# Patient Record
Sex: FEMALE | Race: WHITE | NOT HISPANIC OR LATINO | Employment: OTHER | URBAN - METROPOLITAN AREA
[De-identification: names, ages, dates, MRNs, and addresses within clinical notes are randomized per-mention and may not be internally consistent; named-entity substitution may affect disease eponyms.]

---

## 2017-10-23 ENCOUNTER — OFFICE VISIT (OUTPATIENT)
Dept: OBSTETRICS AND GYNECOLOGY | Facility: CLINIC | Age: 67
End: 2017-10-23
Payer: MEDICARE

## 2017-10-23 VITALS — BODY MASS INDEX: 23.88 KG/M2 | WEIGHT: 143.31 LBS | HEIGHT: 65 IN

## 2017-10-23 DIAGNOSIS — Z01.419 ENCOUNTER FOR GYNECOLOGICAL EXAMINATION: Primary | ICD-10-CM

## 2017-10-23 PROCEDURE — 99999 PR PBB SHADOW E&M-EST. PATIENT-LVL III: CPT | Mod: PBBFAC,,, | Performed by: OBSTETRICS & GYNECOLOGY

## 2017-10-23 PROCEDURE — G0101 CA SCREEN;PELVIC/BREAST EXAM: HCPCS | Mod: S$GLB,,, | Performed by: OBSTETRICS & GYNECOLOGY

## 2017-10-23 RX ORDER — FLAXSEED OIL 1000 MG
CAPSULE ORAL
Status: ON HOLD | COMMUNITY
Start: 2013-03-11 | End: 2023-02-01 | Stop reason: HOSPADM

## 2017-10-23 RX ORDER — VENLAFAXINE HYDROCHLORIDE 75 MG/1
75 CAPSULE, EXTENDED RELEASE ORAL DAILY
COMMUNITY
End: 2019-10-17 | Stop reason: SDUPTHER

## 2017-10-23 RX ORDER — METFORMIN HYDROCHLORIDE 500 MG/1
500 TABLET ORAL 2 TIMES DAILY WITH MEALS
COMMUNITY
Start: 2015-05-04 | End: 2019-10-17 | Stop reason: SDUPTHER

## 2017-10-23 RX ORDER — GABAPENTIN 600 MG/1
400 TABLET ORAL 3 TIMES DAILY
COMMUNITY
End: 2019-10-17

## 2017-10-23 NOTE — PROGRESS NOTES
"CC: Well woman exam    ANA ROSA Green is a 67 y.o. female No obstetric history on file. presents for a well woman exam.  She is established.LMP: No LMP recorded. Patient has had a hysterectomy..    Annual Exam, Went on a mission trip in  in [facility], had a fall & blacked out, hit her head.   Stated she has had 2-3 falls within the past yr. Seeing Dr Sanchez PCP   On a high protein diet.    Last mammogram Dr. BURGOS at [address]   Last Dexa  normal.    Health Maintenance   Topic Date Due    Hepatitis C Screening  1950    Lipid Panel  1950    Mammogram  1990    DEXA SCAN  1990    Colonoscopy  2000       Past Medical History:   Diagnosis Date    Anxiety     Depression     Family history of breast cancer in mother      at age 68    Hyperlipidemia     Hypertension     Menopause     Peptic ulcer     Reflux esophagitis      Past Surgical History:   Procedure Laterality Date    APPENDECTOMY      CARPAL TUNNEL RELEASE  2017    , left    DILATION AND CURETTAGE OF UTERUS  1986    HYSTERECTOMY      BEAU w/ nilo, no BSO     SHOULDER SURGERY   &     right rotator cuff    TONSILLECTOMY       Family History   Problem Relation Age of Onset    Breast cancer Mother     Hyperlipidemia Sister     Breast cancer Paternal Aunt      Social History   Substance Use Topics    Smoking status: Never Smoker    Smokeless tobacco: Never Used    Alcohol use No     OB History     No data available          Ht 5' 5" (1.651 m)   Wt 65 kg (143 lb 4.8 oz)   BMI 23.85 kg/m²     ROS:  GENERAL: Denies weight gain or weight loss. Feeling well overall.   SKIN: Denies rash or lesions. .   CHEST: Denies chest pain or shortness of breath.   CARDIOVASCULAR: Denies palpitations or left sided chest pain.   ABDOMEN: No abdominal pain  URINARY: No frequency, dysuria, hematuria, or burning on urination.    PE:   APPEARANCE: Well nourished, well developed, in " no acute distress.  AFFECT: WNL, alert and oriented x 3.  SKIN: No acne or hirsutism.  ABDOMEN: Soft. No tenderness or masses. No hepatosplenomegaly. No hernias.  BREASTS: Symmetrical, no skin changes or visible lesions. No palpable masses, nipple discharge bilaterally.  PELVIC: Normal external female genitalia without lesions. Normal hair distribution.   Adequate perineal body, normal urethral meatus. Vagina atrophic without lesions or discharge.    Bimanual exam shows uterus and cervix to be surgically absent. Adnexa without masses or tenderness.  EXTREMITIES: No edema.      ICD-10-CM ICD-9-CM    1. Encounter for gynecological examination Z01.419 V72.31 No pap done           Patient was counseled today on A.C.S. Pap guidelines and recommendations for yearly pelvic exams, mammograms and monthly self breast exams; to see her PCP for other health maintenance.     Return in about 1 year (around 10/23/2018).

## 2017-11-01 ENCOUNTER — OFFICE VISIT (OUTPATIENT)
Dept: URGENT CARE | Facility: CLINIC | Age: 67
End: 2017-11-01
Payer: MEDICARE

## 2017-11-01 VITALS
TEMPERATURE: 97 F | HEIGHT: 65 IN | OXYGEN SATURATION: 98 % | RESPIRATION RATE: 16 BRPM | DIASTOLIC BLOOD PRESSURE: 80 MMHG | BODY MASS INDEX: 24.16 KG/M2 | SYSTOLIC BLOOD PRESSURE: 134 MMHG | HEART RATE: 61 BPM | WEIGHT: 145 LBS

## 2017-11-01 DIAGNOSIS — J06.0 SORE THROAT AND LARYNGITIS: ICD-10-CM

## 2017-11-01 DIAGNOSIS — J06.9 UPPER RESPIRATORY TRACT INFECTION, UNSPECIFIED TYPE: Primary | ICD-10-CM

## 2017-11-01 PROCEDURE — 96372 THER/PROPH/DIAG INJ SC/IM: CPT | Mod: S$GLB,,, | Performed by: EMERGENCY MEDICINE

## 2017-11-01 PROCEDURE — 99213 OFFICE O/P EST LOW 20 MIN: CPT | Mod: 25,S$GLB,, | Performed by: NURSE PRACTITIONER

## 2017-11-01 RX ORDER — CODEINE PHOSPHATE AND GUAIFENESIN 10; 100 MG/5ML; MG/5ML
10 SOLUTION ORAL EVERY 6 HOURS PRN
Qty: 120 ML | Refills: 0 | Status: SHIPPED | OUTPATIENT
Start: 2017-11-01 | End: 2017-11-11

## 2017-11-01 RX ORDER — BETAMETHASONE SODIUM PHOSPHATE AND BETAMETHASONE ACETATE 3; 3 MG/ML; MG/ML
6 INJECTION, SUSPENSION INTRA-ARTICULAR; INTRALESIONAL; INTRAMUSCULAR; SOFT TISSUE
Status: COMPLETED | OUTPATIENT
Start: 2017-11-01 | End: 2017-11-01

## 2017-11-01 RX ORDER — BENZONATATE 200 MG/1
200 CAPSULE ORAL 3 TIMES DAILY PRN
Qty: 30 CAPSULE | Refills: 0 | Status: SHIPPED | OUTPATIENT
Start: 2017-11-01 | End: 2017-11-11

## 2017-11-01 RX ORDER — FLUTICASONE PROPIONATE 50 MCG
1 SPRAY, SUSPENSION (ML) NASAL 2 TIMES DAILY
Qty: 1 BOTTLE | Refills: 0 | Status: SHIPPED | OUTPATIENT
Start: 2017-11-01 | End: 2018-10-29

## 2017-11-01 RX ORDER — MONTELUKAST SODIUM 10 MG/1
10 TABLET ORAL DAILY
Qty: 30 TABLET | Refills: 2 | Status: SHIPPED | OUTPATIENT
Start: 2017-11-01 | End: 2018-10-29

## 2017-11-01 RX ORDER — AZELASTINE 1 MG/ML
1 SPRAY, METERED NASAL 2 TIMES DAILY
Qty: 30 ML | Refills: 0 | Status: SHIPPED | OUTPATIENT
Start: 2017-11-01 | End: 2018-10-29

## 2017-11-01 RX ADMIN — BETAMETHASONE SODIUM PHOSPHATE AND BETAMETHASONE ACETATE 6 MG: 3; 3 INJECTION, SUSPENSION INTRA-ARTICULAR; INTRALESIONAL; INTRAMUSCULAR; SOFT TISSUE at 09:11

## 2017-11-01 NOTE — PATIENT INSTRUCTIONS
"                                                         URI   If your condition worsens or fails to improve we recommend that you receive another evaluation at the ER immediately or contact your PCP to discuss your concerns or return here. You must understand that you've received an urgent care treatment only and that you may be released before all your medical problems are known or treated. You the patient will arrange for follouwp care as instructed.   If we discussed that I think your illness is viral, it will not respond to antibiotics and will last 10-14 days.   Flonase (fluticasone) is a nasal spray which is available over the counter and may help with your symptoms.   Zyrtec D, Claritin D or Allegra D can also help with symptoms of congestion and drainage.   If you have hypertension avoid using the "D" which is the decongestant   If you just have drainage you can take plain zyrtec, claritin or allegra   If you just have a congested feeling you can take pseudoephedrine (unless you have high blood pressure) which you have to sign for behind the counter. Do not buy the phenylephrine which is on the shelf as it is not effective   Rest and fluids are also important.   Tylenol or ibuprofen can also be used as directed for pain unless you have an allergy to them or medical condition such as stomach ulcers, kidney or liver disease or blood thinners etc for which you should not be taking these type of medications.   If you are flying in the next few days Afrin nose drops for the airplane flight upon take off and landing may help. Other than at those times refrain from using afrin.   If you were prescribed a narcotic do not drive or operate heavy machinery while taking these medications.       Viral Upper Respiratory Illness (Adult)  You have a viral upper respiratory illness (URI), which is another term for the common cold. This illness is contagious during the first few days. It is spread through the air by coughing " and sneezing. It may also be spread by direct contact (touching the sick person and then touching your own eyes, nose, or mouth). Frequent handwashing will decrease risk of spread. Most viral illnesses go away within 7 to 10 days with rest and simple home remedies. Sometimes the illness may last for several weeks. Antibiotics will not kill a virus, and they are generally not prescribed for this condition.    Home care  · If symptoms are severe, rest at home for the first 2 to 3 days. When you resume activity, don't let yourself get too tired.  · Avoid being exposed to cigarette smoke (yours or others).  · You may use acetaminophen or ibuprofen to control pain and fever, unless another medicine was prescribed. (Note: If you have chronic liver or kidney disease, have ever had a stomach ulcer or gastrointestinal bleeding, or are taking blood-thinning medicines, talk with your healthcare provider before using these medicines.) Aspirin should never be given to anyone under 18 years of age who is ill with a viral infection or fever. It may cause severe liver or brain damage.  · Your appetite may be poor, so a light diet is fine. Avoid dehydration by drinking 6 to 8 glasses of fluids per day (water, soft drinks, juices, tea, or soup). Extra fluids will help loosen secretions in the nose and lungs.  · Over-the-counter cold medicines will not shorten the length of time youre sick, but they may be helpful for the following symptoms: cough, sore throat, and nasal and sinus congestion. (Note: Do not use decongestants if you have high blood pressure.)  Follow-up care  Follow up with your healthcare provider, or as advised.  When to seek medical advice  Call your healthcare provider right away if any of these occur:  · Cough with lots of colored sputum (mucus)  · Severe headache; face, neck, or ear pain  · Difficulty swallowing due to throat pain  · Fever of 100.4°F (38°C)  Call 911, or get immediate medical care  Call  emergency services right away if any of these occur:  · Chest pain, shortness of breath, wheezing, or difficulty breathing  · Coughing up blood  · Inability to swallow due to throat pain  Date Last Reviewed: 9/13/2015 © 2000-2017 [employer]. [address] [place] [address]. All rights reserved. This information is not intended as a substitute for professional medical care. Always follow your healthcare professional's instructions.        Laryngitis    Laryngitis is a swelling of the tissues around the vocal cords. Symptoms include a hoarse (scratchy) voice. The voice may be lost completely. It may be caused by a viral illness, such as a head or chest cold. It may also be due to overuse and strain of the voice. Smoking, drinking alcohol, acid reflux, allergies, or inhaling harsh chemicals may also lead to symptoms. This condition will usually resolve in 1-2 weeks.  Home care  · Rest your voice until it recovers. Talk as little as possible. If your symptoms are severe, rest at home for a day or so.  · Breathing cool steam from a humidifier/vaporizer or in a steamy shower may be helpful.  · Drink plenty of fluids to stay well hydrated.  · Do not smoke  Follow-up care  Follow up with your healthcare provider or this facility if you have not improved after one week.  When to seek medical advice  Contact your healthcare provider for any of the following:  · Severe pain with swallowing  · Trouble opening mouth  · Neck swelling, neck pain, or trouble moving neck  · Noisy breathing or trouble breathing  · Fever of 100.4°F (38.ºC) or higher, or as directed by your healthcare provider  · Drooling  · Symptoms do not resolve in 2 weeks  Date Last Reviewed: 4/26/2015 © 2000-2017 [employer]. [address] [place] [address]. All rights reserved. This information is not intended as a substitute for professional medical care. Always follow your healthcare professional's  instructions.

## 2017-11-01 NOTE — PROGRESS NOTES
"Subjective:       Patient ID: ANA ROSA Green is a 67 y.o. female.    Vitals:  height is 5' 5" (1.651 m) and weight is 65.8 kg (145 lb). Her oral temperature is 97.3 °F (36.3 °C). Her blood pressure is 134/80 and her pulse is 61. Her respiration is 16 and oxygen saturation is 98%.     Chief Complaint: Cough    Cough   This is a new problem. The current episode started in the past 7 days. The problem has been gradually worsening. The problem occurs every few minutes. The cough is productive of sputum. Associated symptoms include nasal congestion and postnasal drip. Pertinent negatives include no chest pain, chills, ear pain, eye redness, fever, headaches, myalgias, sore throat, shortness of breath or wheezing. The symptoms are aggravated by cold air and lying down. She has tried OTC cough suppressant for the symptoms. The treatment provided mild relief.     Review of Systems   Constitution: Negative for chills, fever and malaise/fatigue.   HENT: Positive for congestion and postnasal drip. Negative for ear pain, hoarse voice and sore throat.    Eyes: Negative for discharge and redness.   Cardiovascular: Negative for chest pain, dyspnea on exertion and leg swelling.   Respiratory: Positive for sputum production. Negative for cough, shortness of breath and wheezing.    Musculoskeletal: Negative for myalgias.   Gastrointestinal: Negative for abdominal pain and nausea.   Neurological: Negative for headaches.       Objective:      Physical Exam   Constitutional: She is oriented to person, place, and time. Vital signs are normal. She appears well-developed and well-nourished. She is cooperative.  Non-toxic appearance. She does not have a sickly appearance. She does not appear ill. No distress.   HENT:   Head: Normocephalic and atraumatic.   Right Ear: Hearing, tympanic membrane, external ear and ear canal normal.   Left Ear: Hearing, tympanic membrane, external ear and ear canal normal.   Nose: Mucosal edema and rhinorrhea " present. No nasal deformity. No epistaxis. Right sinus exhibits no maxillary sinus tenderness and no frontal sinus tenderness. Left sinus exhibits no maxillary sinus tenderness and no frontal sinus tenderness.   Mouth/Throat: Uvula is midline and mucous membranes are normal. No trismus in the jaw. Normal dentition. No uvula swelling. Posterior oropharyngeal erythema present. No oropharyngeal exudate or posterior oropharyngeal edema. Tonsils are 0 on the right. Tonsils are 0 on the left.   +laryngitis   +cobblestone appearance   Eyes: Conjunctivae and lids are normal. No scleral icterus.   Sclera clear bilat   Neck: Trachea normal, full passive range of motion without pain and phonation normal. Neck supple.   Cardiovascular: Normal rate, regular rhythm, normal heart sounds and normal pulses.    Pulmonary/Chest: Effort normal and breath sounds normal. No respiratory distress.   Abdominal: Soft. Normal appearance and bowel sounds are normal. She exhibits no distension. There is no tenderness.   Musculoskeletal: Normal range of motion. She exhibits no edema or deformity.   Lymphadenopathy:     She has no cervical adenopathy.   Neurological: She is alert and oriented to person, place, and time. She exhibits normal muscle tone. Coordination normal.   Skin: Skin is warm, dry and intact. She is not diaphoretic. No pallor.   Psychiatric: She has a normal mood and affect. Her speech is normal and behavior is normal. Judgment and thought content normal. Cognition and memory are normal.   Nursing note and vitals reviewed.      Assessment:       1. Upper respiratory tract infection, unspecified type    2. Sore throat and laryngitis        Plan:         Upper respiratory tract infection, unspecified type  -     betamethasone acetate-betamethasone sodium phosphate injection 6 mg; Inject 1 mL (6 mg total) into the muscle one time.  -     benzonatate (TESSALON) 200 MG capsule; Take 1 capsule (200 mg total) by mouth 3 (three) times  daily as needed for Cough.  Dispense: 30 capsule; Refill: 0  -     guaifenesin-codeine 100-10 mg/5 ml (TUSSI-ORGANIDIN NR)  mg/5 mL syrup; Take 10 mLs by mouth every 6 (six) hours as needed for Cough.  Dispense: 120 mL; Refill: 0  -     azelastine (ASTELIN) 137 mcg (0.1 %) nasal spray; 1 spray (137 mcg total) by Nasal route 2 (two) times daily.  Dispense: 30 mL; Refill: 0  -     fluticasone (FLONASE) 50 mcg/actuation nasal spray; 1 spray by Each Nare route 2 (two) times daily.  Dispense: 1 Bottle; Refill: 0  -     montelukast (SINGULAIR) 10 mg tablet; Take 1 tablet (10 mg total) by mouth once daily.  Dispense: 30 tablet; Refill: 2    Sore throat and laryngitis  -     betamethasone acetate-betamethasone sodium phosphate injection 6 mg; Inject 1 mL (6 mg total) into the muscle one time.  -     azelastine (ASTELIN) 137 mcg (0.1 %) nasal spray; 1 spray (137 mcg total) by Nasal route 2 (two) times daily.  Dispense: 30 mL; Refill: 0  -     fluticasone (FLONASE) 50 mcg/actuation nasal spray; 1 spray by Each Nare route 2 (two) times daily.  Dispense: 1 Bottle; Refill: 0  -     montelukast (SINGULAIR) 10 mg tablet; Take 1 tablet (10 mg total) by mouth once daily.  Dispense: 30 tablet; Refill: 2    Symptomatic care of viral illness discussed.  No recent steroid use or contraindications reported by patient, steroid shot administered in clinic today.  Stay hydrated! Rest.  Sedation warnings given for cough syrup.  F/u with PCP.  Go to the ER for worsening of symptoms.

## 2017-11-04 ENCOUNTER — TELEPHONE (OUTPATIENT)
Dept: URGENT CARE | Facility: CLINIC | Age: 67
End: 2017-11-04

## 2017-11-04 NOTE — TELEPHONE ENCOUNTER
When I called & asked for the pt, the person on the other line stated that I had the wrong number.

## 2018-07-25 ENCOUNTER — OFFICE VISIT (OUTPATIENT)
Dept: URGENT CARE | Facility: CLINIC | Age: 68
End: 2018-07-25
Payer: MEDICARE

## 2018-07-25 VITALS
RESPIRATION RATE: 18 BRPM | HEIGHT: 65 IN | BODY MASS INDEX: 24.16 KG/M2 | TEMPERATURE: 97 F | WEIGHT: 145 LBS | DIASTOLIC BLOOD PRESSURE: 92 MMHG | SYSTOLIC BLOOD PRESSURE: 139 MMHG | HEART RATE: 88 BPM | OXYGEN SATURATION: 98 %

## 2018-07-25 DIAGNOSIS — N30.00 ACUTE CYSTITIS WITHOUT HEMATURIA: ICD-10-CM

## 2018-07-25 DIAGNOSIS — R30.0 DYSURIA: Primary | ICD-10-CM

## 2018-07-25 LAB
BILIRUB UR QL STRIP: NEGATIVE
GLUCOSE UR QL STRIP: NEGATIVE
KETONES UR QL STRIP: NEGATIVE
LEUKOCYTE ESTERASE UR QL STRIP: POSITIVE
PH, POC UA: 6.5 (ref 5–8)
POC BLOOD, URINE: NEGATIVE
POC NITRATES, URINE: NEGATIVE
PROT UR QL STRIP: NEGATIVE
SP GR UR STRIP: 1.01 (ref 1–1.03)
UROBILINOGEN UR STRIP-ACNC: NORMAL (ref 0.1–1.1)

## 2018-07-25 PROCEDURE — 81003 URINALYSIS AUTO W/O SCOPE: CPT | Mod: QW,S$GLB,, | Performed by: NURSE PRACTITIONER

## 2018-07-25 PROCEDURE — 99214 OFFICE O/P EST MOD 30 MIN: CPT | Mod: 25,S$GLB,, | Performed by: NURSE PRACTITIONER

## 2018-07-25 PROCEDURE — 99000 SPECIMEN HANDLING OFFICE-LAB: CPT | Mod: S$GLB,,, | Performed by: NURSE PRACTITIONER

## 2018-07-25 RX ORDER — SULFAMETHOXAZOLE AND TRIMETHOPRIM 800; 160 MG/1; MG/1
1 TABLET ORAL 2 TIMES DAILY
Qty: 20 TABLET | Refills: 0 | Status: SHIPPED | OUTPATIENT
Start: 2018-07-25 | End: 2018-08-04

## 2018-07-25 RX ORDER — PHENAZOPYRIDINE HYDROCHLORIDE 200 MG/1
200 TABLET, FILM COATED ORAL 3 TIMES DAILY
Qty: 6 TABLET | Refills: 0 | Status: SHIPPED | OUTPATIENT
Start: 2018-07-25 | End: 2018-07-27

## 2018-07-25 NOTE — PATIENT INSTRUCTIONS

## 2018-07-25 NOTE — PROGRESS NOTES
"Subjective:       Patient ID: ANA ROSA Green is a 67 y.o. female.    Vitals:  height is 5' 5" (1.651 m) and weight is 65.8 kg (145 lb). Her oral temperature is 96.6 °F (35.9 °C). Her blood pressure is 139/92 (abnormal) and her pulse is 88. Her respiration is 18 and oxygen saturation is 98%.     Chief Complaint: Urinary Frequency    Pt presents to clinic with c/o urinary urgency and frequency x2 days. Denies fever, back pain and dysuria. + hx "kidney infection causing blood infection" x8 yrs ago.       Urinary Frequency    This is a new problem. The current episode started yesterday (2 days). The problem occurs every urination. The problem has been unchanged. The pain is at a severity of 0/10. The patient is experiencing no pain. There has been no fever. There is a history of pyelonephritis. Associated symptoms include frequency and urgency. Pertinent negatives include no chills, hematuria, nausea or vomiting. She has tried increased fluids for the symptoms. The treatment provided no relief. Her past medical history is significant for recurrent UTIs.     Review of Systems   Constitution: Negative for chills and fever.   Skin: Negative for itching.   Musculoskeletal: Negative for back pain.   Gastrointestinal: Negative for abdominal pain, nausea and vomiting.   Genitourinary: Positive for frequency and urgency. Negative for dysuria, genital sores, hematuria, missed menses and non-menstrual bleeding.   All other systems reviewed and are negative.      Objective:      Physical Exam   Constitutional: She is oriented to person, place, and time. She appears well-developed and well-nourished.   HENT:   Head: Normocephalic and atraumatic.   Right Ear: External ear normal.   Left Ear: External ear normal.   Nose: Nose normal.   Eyes: Lids are normal.   Neck: Trachea normal, normal range of motion and phonation normal. Neck supple.   Cardiovascular: Normal pulses.    Pulmonary/Chest: Effort normal.   Abdominal: Soft. Normal " appearance and bowel sounds are normal. She exhibits no distension. There is no tenderness. There is no CVA tenderness.   Neurological: She is alert and oriented to person, place, and time.   Skin: Skin is warm, dry and intact.   Psychiatric: She has a normal mood and affect. Her speech is normal and behavior is normal. Cognition and memory are normal.   Nursing note and vitals reviewed.      Assessment:       1. Dysuria    2. Acute cystitis without hematuria        Plan:     Urine culture sent, will contact pt with results in 5-7 days.     Dysuria  -     POCT Urinalysis, Dipstick, Automated, W/O Scope  -     Urine culture  -     phenazopyridine (PYRIDIUM) 200 MG tablet; Take 1 tablet (200 mg total) by mouth 3 (three) times daily. for 2 days  Dispense: 6 tablet; Refill: 0    Acute cystitis without hematuria  -     sulfamethoxazole-trimethoprim 800-160mg (BACTRIM DS) 800-160 mg Tab; Take 1 tablet by mouth 2 (two) times daily. for 10 days  Dispense: 20 tablet; Refill: 0

## 2018-07-27 ENCOUNTER — TELEPHONE (OUTPATIENT)
Dept: URGENT CARE | Facility: CLINIC | Age: 68
End: 2018-07-27

## 2018-07-29 ENCOUNTER — TELEPHONE (OUTPATIENT)
Dept: URGENT CARE | Facility: CLINIC | Age: 68
End: 2018-07-29

## 2018-07-29 LAB
BACTERIA UR CULT: NORMAL
BACTERIA UR CULT: NORMAL

## 2018-07-30 ENCOUNTER — TELEPHONE (OUTPATIENT)
Dept: URGENT CARE | Facility: CLINIC | Age: 68
End: 2018-07-30

## 2018-07-30 NOTE — TELEPHONE ENCOUNTER
Patient returned call.  States she is having improvement of symptoms.  Patient's lab results given to patient.  Advised that if she has any worsening or no improvement of symptoms, then follow up with PCP or UC.  Verbalized understanding.

## 2018-10-29 ENCOUNTER — OFFICE VISIT (OUTPATIENT)
Dept: OBSTETRICS AND GYNECOLOGY | Facility: CLINIC | Age: 68
End: 2018-10-29
Payer: MEDICARE

## 2018-10-29 VITALS
DIASTOLIC BLOOD PRESSURE: 80 MMHG | SYSTOLIC BLOOD PRESSURE: 142 MMHG | WEIGHT: 160.94 LBS | BODY MASS INDEX: 26.81 KG/M2 | HEIGHT: 65 IN

## 2018-10-29 DIAGNOSIS — B37.2 SKIN YEAST INFECTION: ICD-10-CM

## 2018-10-29 DIAGNOSIS — Z01.419 ENCOUNTER FOR GYNECOLOGICAL EXAMINATION: Primary | ICD-10-CM

## 2018-10-29 PROCEDURE — 99397 PER PM REEVAL EST PAT 65+ YR: CPT | Mod: S$GLB,,, | Performed by: OBSTETRICS & GYNECOLOGY

## 2018-10-29 PROCEDURE — 99999 PR PBB SHADOW E&M-EST. PATIENT-LVL III: CPT | Mod: PBBFAC,,, | Performed by: OBSTETRICS & GYNECOLOGY

## 2018-10-29 RX ORDER — ATORVASTATIN CALCIUM 40 MG/1
TABLET, FILM COATED ORAL
Refills: 3 | COMMUNITY
Start: 2018-10-26 | End: 2019-10-17 | Stop reason: SDUPTHER

## 2018-10-29 RX ORDER — TEMAZEPAM 30 MG/1
CAPSULE ORAL
Refills: 3 | COMMUNITY
Start: 2018-10-26 | End: 2019-10-17 | Stop reason: SDUPTHER

## 2018-10-29 RX ORDER — CLOTRIMAZOLE AND BETAMETHASONE DIPROPIONATE 10; .64 MG/G; MG/G
CREAM TOPICAL
Qty: 15 G | Refills: 0 | Status: SHIPPED | OUTPATIENT
Start: 2018-10-29 | End: 2019-10-17

## 2018-10-29 RX ORDER — PANTOPRAZOLE SODIUM 40 MG/1
TABLET, DELAYED RELEASE ORAL
Refills: 11 | COMMUNITY
Start: 2018-10-26 | End: 2020-06-16 | Stop reason: SDUPTHER

## 2018-10-29 RX ORDER — GABAPENTIN 100 MG/1
CAPSULE ORAL
Refills: 5 | COMMUNITY
Start: 2018-10-09 | End: 2019-10-17

## 2018-10-29 NOTE — PROGRESS NOTES
[facility] Complaint Well woman exam:  Well Woman (Annual Exam  --  Last Vaginal Pap/Hpv 14, Negative    --  Last MMG 18, Birads 2/B ( Dr Pavel Deleon)  --  Colonoscopy 2018, Normal  ([facility]) )      ANA ROSA Green is a 68 y.o. female  presents for a well woman exam.    She is established.  No GYN c/o  Recent dx celiac and diverticulosis  S/p BEAU and No bso  Sees Dr Cao for psoriasis but does have        LMP: none  S/p BEAU  Last pap:hyst  Last MM2018 Birads 2 EJ  Last colonoscopy: 2018 Dr Amaral     Current Outpatient Medications:     atorvastatin (LIPITOR) 40 MG tablet, , Disp: , Rfl: 3    flaxseed oil 1,000 mg Cap, once a day, Disp: , Rfl:     gabapentin (NEURONTIN) 400 MG capsule, Take 400 mg by mouth 3 (three) times daily., Disp: , Rfl:     gluc-condr-om3-dha-epa-fish-st (GLUCOSAMINE CHONDROITIN PLUS) 202-247-09-54 mg Cap, once a day, Disp: , Rfl:     metformin (GLUCOPHAGE) 500 MG tablet, twice a day, Disp: , Rfl:     omega 3-dha-epa-fish oil (FISH OIL) 100-160-1,000 mg Cap, once a day, Disp: , Rfl:     pantoprazole (PROTONIX) 40 MG tablet, , Disp: , Rfl: 11    temazepam (RESTORIL) 30 mg capsule, , Disp: , Rfl: 3    venlafaxine (EFFEXOR-XR) 75 MG 24 hr capsule, Take 75 mg by mouth once daily., Disp: , Rfl:     vitamins  A,C,E-zinc-copper (PRESERVISION AREDS) 14,320-226-200 unit-mg-unit Cap, Take 1 tablet by mouth once daily., Disp: , Rfl:     clotrimazole-betamethasone 1-0.05% (LOTRISONE) cream, Apply under breasts 2 times daily, Disp: 15 g, Rfl: 0    gabapentin (NEURONTIN) 100 MG capsule, , Disp: , Rfl: 5    Past Medical History:   Diagnosis Date    Anxiety     Celiac disease 2018    Depression     Family history of breast cancer in mother      at age 68    H/O mammogram 2018    Birads 2/B  (Dr Deleon, Orders MMG yearly)     Hx of bone density study 2014    Normal     Hyperlipidemia     Hypertension     Menopause     Peptic ulcer      "Reflux esophagitis     Vaginal Pap smear 2014    Pap/Hpv Negative        Past Surgical History:   Procedure Laterality Date    APPENDECTOMY      CARPAL TUNNEL RELEASE Bilateral 2017    ,     COLONOSCOPY  2018    Normal  (Cornell Velazco)     DILATION AND CURETTAGE OF UTERUS  1986    DUPUYTREN CONTRACTURE RELEASE Bilateral 2017    HYSTERECTOMY  1987    BEAU w/ appy, no BSO     SHOULDER SURGERY  2009 &     right rotator cuff    TONSILLECTOMY      UPPER GASTROINTESTINAL ENDOSCOPY  2018       OB History    Para Term  AB Living   0 0 0 0 0 0   SAB TAB Ectopic Multiple Live Births   0 0 0 0 0             Family History   Problem Relation Age of Onset    Breast cancer Mother     Cancer Mother     Hyperlipidemia Sister     Breast cancer Paternal Aunt     Cancer Paternal Aunt        Social History     Tobacco Use    Smoking status: Never Smoker    Smokeless tobacco: Never Used   Substance Use Topics    Alcohol use: No    Drug use: No         ROS:    GENERAL: Denies weight gain or weight loss. Feeling well overall.   SKIN: Denies rash or lesions.   HEENT: Denies headaches, or vision changes.   CARDIOVASCULAR: Denies palpitations or left sided chest pain.   RESPIRATORY: Denies shortness of breath or dyspnea on exertion.  BREASTS: Denies pain, lumps, or nipple discharge.   ABDOMEN: Denies abdominal pain, constipation, diarrhea, nausea, vomiting, change in appetite or rectal bleeding.   URINARY: Denies frequency, dysuria, hematuria.  NEUROLOGIC: Denies syncope or weakness.   PSYCHIATRIC: Denies depression, anxiety or mood swings.    Physical Exam:  BP (!) 142/80   Ht 5' 5" (1.651 m)   Wt 73 kg (160 lb 15 oz)   LMP  (LMP Unknown) Comment: BEAU/ NO BSO    BMI 26.78 kg/m²     APPEARANCE: Well nourished, well developed, in no acute distress.  AFFECT: WNL, alert and oriented x 3  SKIN: No acne or hirsutism  BREASTS: Symmetrical, no skin changes Yeast seen on left " breast                    No nipple discharge. No palpable masses bilaterally  ABDOMEN: soft Non tender Non distended No masses  PELVIC:   Normal external genitalia without lesions.   Normal urethral meatus. No signif cystocele or rectocele.  Vagina atrophic without lesions or discharge.  Cuff intact  Cervix absent  Adnexa without masses or tenderness.    EXTREMITIES: No edema.    ASSESSMENT AND PLAN  1. Encounter for gynecological examination     2. Skin yeast infection  clotrimazole-betamethasone 1-0.05% (LOTRISONE) cream       ANA ORSA PARK was seen today for well woman.    Diagnoses and all orders for this visit:    Encounter for gynecological examination    Skin yeast infection  -     clotrimazole-betamethasone 1-0.05% (LOTRISONE) cream; Apply under breasts 2 times daily        Annual well woman exam- pap not applicable s/p Hyst    Patient was counseled today on A.C.S. Pap guidelines and recommendations for yearly pelvic exams, mammograms and monthly self breast exams; to see her PCP for other health maintenance.     Patient encouraged to register for portal and results will be sent via portal.    Follow-up in about 1 year (around 10/29/2019).         Health Maintenance   Topic Date Due    Hepatitis C Screening  1950    Lipid Panel  1950    TETANUS VACCINE  08/02/1968    Zoster Vaccine  08/02/2010    Pneumococcal (65+) (1 of 2 - PCV13) 08/02/2015    DEXA SCAN  04/07/2017    Influenza Vaccine  08/01/2018    Mammogram  08/06/2019    Colonoscopy  04/02/2028

## 2019-07-18 ENCOUNTER — TELEPHONE (OUTPATIENT)
Dept: FAMILY MEDICINE | Facility: CLINIC | Age: 69
End: 2019-07-18

## 2019-07-18 NOTE — TELEPHONE ENCOUNTER
Pt states she saw you in April. She is calling to get her A1c checked.. States she does it every 3 months.. She states her last a1c was 6. Something?.. Pt states ok to send message to dr stafford for us to send a new order. We had sent a order in April but instead of [facility] it ended up in the [facility] system. Pt advised we will call first week doctors start at clinic        ----- Message from Hue Kerns sent at 7/18/2019 10:22 AM CDT -----  Contact: Patient     Patient normally has labs at [facility].  Should Dr Calixto enter new orders.

## 2019-08-05 ENCOUNTER — TELEPHONE (OUTPATIENT)
Dept: FAMILY MEDICINE | Facility: CLINIC | Age: 69
End: 2019-08-05

## 2019-08-05 NOTE — TELEPHONE ENCOUNTER
----- Message from Lucia Neil sent at 8/5/2019 11:24 AM CDT -----  Contact: Pt Lily [phone]  Type:  Needs Medical Advice    Who Called: Pt Lily    Would the patient rather a call back or a response via [facility]? Callback    Best Call Back Number: [phone]    Additional Information:     The pt is needing to get orders in the system prior to the appt on 10/17. The pt is also requesting to have her tyroid levels checked since she has Celiac disease. The pt can be contacted if need be

## 2019-08-05 NOTE — TELEPHONE ENCOUNTER
Pt aware, she will sign the record release so we can get her records and she will see  first then get labs

## 2019-08-08 ENCOUNTER — OFFICE VISIT (OUTPATIENT)
Dept: PODIATRY | Facility: CLINIC | Age: 69
End: 2019-08-08
Payer: MEDICARE

## 2019-08-08 VITALS
SYSTOLIC BLOOD PRESSURE: 136 MMHG | DIASTOLIC BLOOD PRESSURE: 88 MMHG | HEART RATE: 73 BPM | HEIGHT: 65 IN | BODY MASS INDEX: 25.83 KG/M2 | WEIGHT: 155 LBS

## 2019-08-08 DIAGNOSIS — E11.42 DIABETIC POLYNEUROPATHY ASSOCIATED WITH TYPE 2 DIABETES MELLITUS: Primary | ICD-10-CM

## 2019-08-08 DIAGNOSIS — B35.1 ONYCHOMYCOSIS DUE TO DERMATOPHYTE: ICD-10-CM

## 2019-08-08 DIAGNOSIS — R20.2 PARESTHESIA OF BOTH FEET: ICD-10-CM

## 2019-08-08 DIAGNOSIS — Q66.70 PES CAVUS: ICD-10-CM

## 2019-08-08 PROCEDURE — 99203 OFFICE O/P NEW LOW 30 MIN: CPT | Mod: S$GLB,,, | Performed by: PODIATRIST

## 2019-08-08 PROCEDURE — 99203 PR OFFICE/OUTPT VISIT, NEW, LEVL III, 30-44 MIN: ICD-10-PCS | Mod: S$GLB,,, | Performed by: PODIATRIST

## 2019-08-08 PROCEDURE — 3288F PR FALLS RISK ASSESSMENT DOCUMENTED: ICD-10-PCS | Mod: CPTII,S$GLB,, | Performed by: PODIATRIST

## 2019-08-08 PROCEDURE — 1100F PTFALLS ASSESS-DOCD GE2>/YR: CPT | Mod: CPTII,S$GLB,, | Performed by: PODIATRIST

## 2019-08-08 PROCEDURE — 3288F FALL RISK ASSESSMENT DOCD: CPT | Mod: CPTII,S$GLB,, | Performed by: PODIATRIST

## 2019-08-08 PROCEDURE — 1100F PR PT FALLS ASSESS DOC 2+ FALLS/FALL W/INJURY/YR: ICD-10-PCS | Mod: CPTII,S$GLB,, | Performed by: PODIATRIST

## 2019-08-08 PROCEDURE — 99999 PR PBB SHADOW E&M-EST. PATIENT-LVL III: CPT | Mod: PBBFAC,,, | Performed by: PODIATRIST

## 2019-08-08 PROCEDURE — 99999 PR PBB SHADOW E&M-EST. PATIENT-LVL III: ICD-10-PCS | Mod: PBBFAC,,, | Performed by: PODIATRIST

## 2019-08-09 NOTE — PROGRESS NOTES
Subjective:      Patient ID: ANA ROSA Green is a 69 y.o. female.    Chief Complaint: Diabetic Foot Exam (PCP--2018, [id number] something) and Nail Care    ANA ROSA PARK is a 69 y.o. female who presents to the clinic for evaluation and treatment of diabetic feet. ANA ROSA PARK has a past medical history of Anxiety, Celiac disease (2018), Depression, Family history of breast cancer in mother, H/O mammogram (2018), bone density study (2014), Hyperlipidemia, Hypertension, Menopause, Peptic ulcer, Reflux esophagitis, and Vaginal Pap smear (2014). Patient presents to clinic with the chief concern regarding appropriate shoe gear for her foot type.  She notes having a pronounced in step making shoe selection quite difficult.  Notes after periods of wearing her vionic sandals and tennis shoes, her feet begin to diffusely hurt.  Describes pain as aching that is alleviated only with rest.  Inquires as to my recommendations.  Also, relates having neuropathy pain, burning/tingling, while at rest.  Notes taking gabapentin with minimal improvement in symptoms.  Inquires as to additional treatment options to address this issue.  Denies any additional pedal complaints.    PCP: Pavel Calixto MD    Date Last Seen by PCP: 18  No results found for: HGBA1C      Past Medical History:   Diagnosis Date    Anxiety     Celiac disease 2018    Depression     Family history of breast cancer in mother      at age 68    H/O mammogram 2018    Birads 2/B  (Dr Deleon, Orders MMG yearly)     Hx of bone density study 2014    Normal     Hyperlipidemia     Hypertension     Menopause     Peptic ulcer     Reflux esophagitis     Vaginal Pap smear 2014    Pap/Hpv Negative        Past Surgical History:   Procedure Laterality Date    APPENDECTOMY      CARPAL TUNNEL RELEASE Bilateral 2017    ,     COLONOSCOPY  2018    Normal  (Cornell Velazco)     DILATION AND CURETTAGE OF  UTERUS  1986    DUPUYTREN CONTRACTURE RELEASE Bilateral 09/2017    HYSTERECTOMY  1987    BEAU w/ appy, no BSO     SHOULDER SURGERY  2009 & 2010    right rotator cuff    TONSILLECTOMY      UPPER GASTROINTESTINAL ENDOSCOPY  04/2018       Family History   Problem Relation Age of Onset    Breast cancer Mother     Cancer Mother     Hyperlipidemia Sister     Breast cancer Paternal Aunt     Cancer Paternal Aunt        Social History     Socioeconomic History    Marital status:      Spouse name: Not on file    Number of children: Not on file    Years of education: Not on file    Highest education level: Not on file   Occupational History    Not on file   Social Needs    Financial resource strain: Not on file    Food insecurity:     Worry: Not on file     Inability: Not on file    Transportation needs:     Medical: Not on file     Non-medical: Not on file   Tobacco Use    Smoking status: Never Smoker    Smokeless tobacco: Never Used   Substance and Sexual Activity    Alcohol use: No    Drug use: No    Sexual activity: Not Currently     Birth control/protection: See Surgical Hx, Surgical     Comment: :    Lifestyle    Physical activity:     Days per week: Not on file     Minutes per session: Not on file    Stress: Not on file   Relationships    Social connections:     Talks on phone: Not on file     Gets together: Not on file     Attends Gnosticism service: Not on file     Active member of club or organization: Not on file     Attends meetings of clubs or organizations: Not on file     Relationship status: Not on file   Other Topics Concern    Not on file   Social History Narrative    Not on file       Current Outpatient Medications   Medication Sig Dispense Refill    amoxicillin-clavulanate 875-125mg (AUGMENTIN) 875-125 mg per tablet Take 1 tablet by mouth 2 (two) times daily. 14 tablet 0    atorvastatin (LIPITOR) 40 MG tablet   3    clotrimazole-betamethasone 1-0.05% (LOTRISONE)  cream Apply under breasts 2 times daily 15 g 0    flaxseed oil 1,000 mg Cap once a day      gabapentin (NEURONTIN) 100 MG capsule   5    gabapentin (NEURONTIN) 600 MG tablet Take 400 mg by mouth 3 (three) times daily.       gluc-condr-om3-dha-epa-fish-st (GLUCOSAMINE CHONDROITIN PLUS) 994-587-80-54 mg Cap once a day      metformin (GLUCOPHAGE) 500 MG tablet twice a day-one in am and 2 at night      omega 3-dha-epa-fish oil (FISH OIL) 100-160-1,000 mg Cap once a day      pantoprazole (PROTONIX) 40 MG tablet   11    temazepam (RESTORIL) 30 mg capsule   3    venlafaxine (EFFEXOR-XR) 75 MG 24 hr capsule Take 75 mg by mouth once daily.      vitamins  A,C,E-zinc-copper (PRESERVISION AREDS) 14,320-226-200 unit-mg-unit Cap Take 1 tablet by mouth once daily.       No current facility-administered medications for this visit.        Review of patient's allergies indicates:   Allergen Reactions    Crestor [rosuvastatin] Swelling         Review of Systems   Constitution: Negative for chills and fever.   Cardiovascular: Negative for claudication and leg swelling.   Skin: Positive for color change and nail changes.   Musculoskeletal: Positive for joint pain. Negative for joint swelling, muscle cramps and muscle weakness.   Neurological: Positive for numbness and paresthesias.   Psychiatric/Behavioral: Negative for altered mental status.           Objective:      Physical Exam   Constitutional: She is oriented to person, place, and time. She appears well-developed and well-nourished. No distress.   Cardiovascular:   Pulses:       Dorsalis pedis pulses are 2+ on the right side, and 2+ on the left side.        Posterior tibial pulses are 1+ on the right side, and 1+ on the left side.   CFT is < 3 seconds bilateral.  Pedal hair growth is decreased bilateral.  Mild non pitting lower extremity edema noted bilateral.  Toes are warm to touch bilateral.   Musculoskeletal: She exhibits no edema or tenderness.   Muscle strength  5/5 in all muscle groups bilateral.  No tenderness nor crepitation with ROM of foot/ankle joints bilateral.  No tenderness with palpation of bilateral foot and ankle.  Bilateral pes cavus foot type.   Neurological: She is alert and oriented to person, place, and time. She has normal strength. A sensory deficit is present.   Protective sensation per Hammond-Corazon monofilament is decreased bilateral.  Vibratory sensation is decreased bilateral.  Light touch is intact bilateral.   Skin: Skin is warm, dry and intact. Capillary refill takes less than 2 seconds. No abrasion, no bruising, no burn, no ecchymosis, no lesion, no petechiae and no rash noted. She is not diaphoretic. No erythema. No pallor.   Pedal skin appears thin bilateral.  Toenails x 10 appear thickened by 2 mm's, elongated by 2 mm's, and discolored with subungual debris. Examination of the skin reveals no evidence of significant maceration, rashes, open lesions, suspicious appearing nevi or other concerning lesions.              Assessment:       Encounter Diagnoses   Name Primary?    Diabetic polyneuropathy associated with type 2 diabetes mellitus Yes    Paresthesia of both feet     Pes cavus     Onychomycosis due to dermatophyte          Plan:       ANA ROSA PARK was seen today for diabetic foot exam and nail care.    Diagnoses and all orders for this visit:    Diabetic polyneuropathy associated with type 2 diabetes mellitus    Paresthesia of both feet    Pes cavus    Onychomycosis due to dermatophyte      I counseled the patient on her conditions, their implications and medical management.    Discussed appropriate shoe gear to accommodate for pes cavus foot type.    Given both verbal and written information regarding alpha lipoic acid and B-complex vitamins.  Take as instructed in clinic.    Discussed natural topicals she can apply to bilateral foot to address paresthesias.    Will consider an increase in gabapentin should symptoms  persist.    Discussed with patient a variety of treatment options to address onychomycosis including Yemi Vaporub, topical/oral antifungals, and laser therapy.    Advised to regularly clean shoe gear and shower surfaces to limit exposure.    Advised to be wary of walking barefoot on carpeted surfaces at gyms and hotel rooms.  Also, avoid barefoot walking at public showers and pool areas.    Shoe inspection. Diabetic Foot Education. Patient reminded of the importance of good nutrition and blood sugar control to help prevent podiatric complications of diabetes. Patient instructed on proper foot hygeine. We discussed wearing proper shoe gear, daily foot inspections, never walking without protective shoe gear, never putting sharp instruments to feet    With patient's permission, nails were aggressively reduced and debrided x 10 to their soft tissue attachment mechanically and with electric , removing all offending nail and debris. Patient relates relief following the procedure. She will continue to monitor the areas daily, inspect her feet, wear protective shoe gear when ambulatory, moisturizer to maintain skin integrity and follow in this office in approximately 2-3 months, sooner p.r.n.    Follow up in about 3 months (around 11/8/2019).    Lucas Bryan DPM

## 2019-10-17 ENCOUNTER — OFFICE VISIT (OUTPATIENT)
Dept: FAMILY MEDICINE | Facility: CLINIC | Age: 69
End: 2019-10-17
Payer: MEDICARE

## 2019-10-17 VITALS
SYSTOLIC BLOOD PRESSURE: 138 MMHG | RESPIRATION RATE: 18 BRPM | TEMPERATURE: 98 F | HEIGHT: 65 IN | WEIGHT: 152.31 LBS | HEART RATE: 69 BPM | OXYGEN SATURATION: 98 % | DIASTOLIC BLOOD PRESSURE: 84 MMHG | BODY MASS INDEX: 25.38 KG/M2

## 2019-10-17 DIAGNOSIS — M50.30 DDD (DEGENERATIVE DISC DISEASE), CERVICAL: ICD-10-CM

## 2019-10-17 DIAGNOSIS — M54.2 CHRONIC NECK PAIN: ICD-10-CM

## 2019-10-17 DIAGNOSIS — E78.2 HYPERLIPIDEMIA, MIXED: ICD-10-CM

## 2019-10-17 DIAGNOSIS — G89.29 CHRONIC NECK PAIN: ICD-10-CM

## 2019-10-17 DIAGNOSIS — K90.0 CELIAC DISEASE: ICD-10-CM

## 2019-10-17 DIAGNOSIS — F33.0 MILD RECURRENT MAJOR DEPRESSION: ICD-10-CM

## 2019-10-17 DIAGNOSIS — Z12.31 SCREENING MAMMOGRAM, ENCOUNTER FOR: ICD-10-CM

## 2019-10-17 DIAGNOSIS — F51.01 PRIMARY INSOMNIA: ICD-10-CM

## 2019-10-17 DIAGNOSIS — E11.42 TYPE 2 DIABETES MELLITUS WITH DIABETIC POLYNEUROPATHY, WITHOUT LONG-TERM CURRENT USE OF INSULIN: Primary | ICD-10-CM

## 2019-10-17 DIAGNOSIS — K21.9 GERD WITHOUT ESOPHAGITIS: ICD-10-CM

## 2019-10-17 PROCEDURE — 99214 PR OFFICE/OUTPT VISIT, EST, LEVL IV, 30-39 MIN: ICD-10-PCS | Mod: S$GLB,,, | Performed by: INTERNAL MEDICINE

## 2019-10-17 PROCEDURE — 99999 PR PBB SHADOW E&M-EST. PATIENT-LVL III: CPT | Mod: PBBFAC,,, | Performed by: INTERNAL MEDICINE

## 2019-10-17 PROCEDURE — 1101F PR PT FALLS ASSESS DOC 0-1 FALLS W/OUT INJ PAST YR: ICD-10-PCS | Mod: CPTII,S$GLB,, | Performed by: INTERNAL MEDICINE

## 2019-10-17 PROCEDURE — 1101F PT FALLS ASSESS-DOCD LE1/YR: CPT | Mod: CPTII,S$GLB,, | Performed by: INTERNAL MEDICINE

## 2019-10-17 PROCEDURE — 99999 PR PBB SHADOW E&M-EST. PATIENT-LVL III: ICD-10-PCS | Mod: PBBFAC,,, | Performed by: INTERNAL MEDICINE

## 2019-10-17 PROCEDURE — 99214 OFFICE O/P EST MOD 30 MIN: CPT | Mod: S$GLB,,, | Performed by: INTERNAL MEDICINE

## 2019-10-17 RX ORDER — TEMAZEPAM 30 MG/1
30 CAPSULE ORAL NIGHTLY PRN
Qty: 30 CAPSULE | Refills: 3 | Status: SHIPPED | OUTPATIENT
Start: 2019-10-17 | End: 2020-03-17

## 2019-10-17 RX ORDER — GABAPENTIN 400 MG/1
CAPSULE ORAL
Refills: 3 | COMMUNITY
Start: 2019-09-21 | End: 2021-04-27

## 2019-10-17 RX ORDER — VITAMIN E (DL,TOCOPHERYL ACET) 90 MG
CAPSULE ORAL
COMMUNITY
End: 2021-04-27

## 2019-10-17 RX ORDER — GABAPENTIN 300 MG/1
CAPSULE ORAL
Refills: 3 | COMMUNITY
Start: 2019-10-08 | End: 2019-11-05

## 2019-10-17 RX ORDER — METFORMIN HYDROCHLORIDE 500 MG/1
500 TABLET ORAL 2 TIMES DAILY WITH MEALS
Qty: 180 TABLET | Refills: 3 | Status: SHIPPED | OUTPATIENT
Start: 2019-10-17 | End: 2020-12-23

## 2019-10-17 RX ORDER — VENLAFAXINE HYDROCHLORIDE 75 MG/1
75 CAPSULE, EXTENDED RELEASE ORAL DAILY
Qty: 90 CAPSULE | Refills: 3 | Status: SHIPPED | OUTPATIENT
Start: 2019-10-17 | End: 2021-01-28 | Stop reason: SDUPTHER

## 2019-10-17 RX ORDER — ATORVASTATIN CALCIUM 40 MG/1
40 TABLET, FILM COATED ORAL DAILY
Qty: 90 TABLET | Refills: 3 | Status: SHIPPED | OUTPATIENT
Start: 2019-10-17 | End: 2021-01-14 | Stop reason: SDUPTHER

## 2019-10-17 NOTE — PROGRESS NOTES
[facility] Note    Chief Complaint      Chief Complaint   Patient presents with    Establish Care    Medication Refill       History of Present Illness      ANA ROSA Green is a 69 y.o. female with chronic conditions of DM2 with neuropathy, HLD, GERD, celiac disease, depression, insomnia, chronic neck pain who presents today for: re-establish care from  and review chronic conditions.    DM2 with neuropathy: Controlled on metformin.  Last A1C 6.9 2019.  A1C due.  Eye exam UTD.  HLD: Controlled on lipitor.  LDL due for repeat.  GERD: Controlled on pantoprazole.  No new or worsening symptoms.  Depression: Controlled on effexor.  No new or worsening ysmptoms  Insomnia: Controlled on restoril.  Control is satisfactory on this regimen.  Chronic neck pain 2/2 cervical degenerative disc disease: Sees Dr. Bunch.  Has had recent MRI C spine (scanned). Has had recent posterior neck injections with some improvement. Has pain that radiates to the top of the skull.  Has been getting massage therapy and cupping, also dry needling with [facility].  On gabapentin 300 mg in AM, lunch and 400 mg at night.  Celiac disease: previously saw Dr. Amor.  Avoiding triggers to avoid exacerbations.      Past Medical History:  Past Medical History:   Diagnosis Date    Anxiety     Celiac disease 2018    Depression     Family history of breast cancer in mother      at age 68    H/O mammogram 2018    Birads 2/B  (Dr Deleon, Orders MMG yearly)     Hx of bone density study 2014    Normal     Hyperlipidemia     Hypertension     Menopause     Peptic ulcer     Reflux esophagitis     Vaginal Pap smear 2014    Pap/Hpv Negative        Past Surgical History:   has a past surgical history that includes Tonsillectomy; Appendectomy; Dilation and curettage of uterus (); Hysterectomy (); Carpal tunnel release (Bilateral, 2017); Shoulder surgery ( & ); Colonoscopy  (04/2018); Upper gastrointestinal endoscopy (04/2018); and Dupuytren contracture release (Bilateral, 09/2017).    Family History:  family history includes Breast cancer in her mother and paternal aunt; Cancer in her mother and paternal aunt; Hyperlipidemia in her sister.     Social History:  Social History     Tobacco Use    Smoking status: Never Smoker    Smokeless tobacco: Never Used   Substance Use Topics    Alcohol use: No    Drug use: No       Review of Systems   Constitutional: Negative for chills, fever and malaise/fatigue.   Respiratory: Negative for shortness of breath.    Cardiovascular: Negative for chest pain.   Gastrointestinal: Negative for constipation, diarrhea, nausea and vomiting.   Skin: Negative for rash.   Neurological: Negative for weakness.        Medications:  Outpatient Encounter Medications as of 10/17/2019   Medication Sig Note Dispense Refill    atorvastatin (LIPITOR) 40 MG tablet    3    calcium carbonate/vitamin D3 (CALTRATE WITH VITAMIN D3 ORAL) Take by mouth.       cinnamon bark (CINNAMON ORAL) Take by mouth.       coenzyme Q10 400 mg Cap Take by mouth.       flaxseed oil 1,000 mg Cap once a day 10/23/2017: Received from: [facility]      gabapentin (NEURONTIN) 300 MG capsule    3    gabapentin (NEURONTIN) 400 MG capsule    3    gluc-condr-om3-dha-epa-fish-st (GLUCOSAMINE CHONDROITIN PLUS) 625-920-59-54 mg Cap once a day 10/23/2017: Received from: [facility]      KRILL OIL ORAL Take by mouth.       metformin (GLUCOPHAGE) 500 MG tablet 500 mg 2 (two) times daily with meals.  10/23/2017: Received from: [facility]      MILK THISTLE ORAL Take by mouth.       pantoprazole (PROTONIX) 40 MG tablet  10/17/2019: Take as needed  11    temazepam (RESTORIL) 30 mg capsule    3    venlafaxine (EFFEXOR-XR) 75 MG 24 hr capsule Take 75 mg by  "mouth once daily.       vitamins  A,C,E-zinc-copper (PRESERVISION AREDS) 14,320-226-200 unit-mg-unit Cap Take 1 tablet by mouth once daily.       [DISCONTINUED] omega 3-dha-epa-fish oil (FISH OIL) 100-160-1,000 mg Cap once a day 10/23/2017: Received from: [facility]      [DISCONTINUED] amoxicillin-clavulanate 875-125mg (AUGMENTIN) 875-125 mg per tablet Take 1 tablet by mouth 2 (two) times daily.  14 tablet 0    [DISCONTINUED] clotrimazole-betamethasone 1-0.05% (LOTRISONE) cream Apply under breasts 2 times daily  15 g 0    [DISCONTINUED] gabapentin (NEURONTIN) 100 MG capsule    5    [DISCONTINUED] gabapentin (NEURONTIN) 600 MG tablet Take 400 mg by mouth 3 (three) times daily.         No facility-administered encounter medications on file as of 10/17/2019.        Allergies:  Review of patient's allergies indicates:   Allergen Reactions    Crestor [rosuvastatin] Swelling       Health Maintenance:  Immunization History   Administered Date(s) Administered    Influenza 11/30/2012      Health Maintenance   Topic Date Due    Hepatitis C Screening  1950    Lipid Panel  1950    Hemoglobin A1c  1950    Foot Exam  08/02/1960    Eye Exam  08/02/1960    Urine Microalbumin  08/02/1960    TETANUS VACCINE  08/02/1968    Pneumococcal Vaccine (65+ Low/Medium Risk) (1 of 2 - PCV13) 08/02/2015    DEXA SCAN  04/07/2017    Mammogram  08/06/2019    Low Dose Statin  10/17/2020    Colonoscopy  04/02/2028      Flu shot UTD 2019.  TdAP 2015.  Prevnar UTD, pneumovax UTD.  Shignrix discussed.  Mammogram declines.  DEXA 2015, normal.  Cscope 2018, Dr. Amor, no polyps, 5 yr interval.    Physical Exam      Vital Signs  Temp: 97.6 °F (36.4 °C)  Temp src: Oral  Pulse: 69  Resp: 18  SpO2: 98 %  BP: 138/84  BP Location: Right arm  Patient Position: Sitting  Pain Score: 0-No pain  Height and Weight  Height: 5' 5" (165.1 cm)  Weight: 69.1 kg (152 lb 5.4 oz)  BSA " (Calculated - sq m): 1.78 sq meters  BMI (Calculated): 25.4  Weight in (lb) to have BMI = 25: 149.9]    Physical Exam   Constitutional: She appears well-developed and well-nourished.   HENT:   Head: Normocephalic and atraumatic.   Right Ear: External ear normal.   Left Ear: External ear normal.   Mouth/Throat: Oropharynx is clear and moist.   Eyes: Pupils are equal, round, and reactive to light. Conjunctivae and EOM are normal.   Neck: Carotid bruit is not present.   Cardiovascular: Normal rate, regular rhythm, normal heart sounds and intact distal pulses.   No murmur heard.  Pulmonary/Chest: Effort normal and breath sounds normal. She has no wheezes. She has no rales.   Abdominal: Soft. Bowel sounds are normal. She exhibits no distension. There is no hepatosplenomegaly. There is no tenderness.   Musculoskeletal: She exhibits no edema.   Vitals reviewed.       Laboratory:  CBC:      CMP:        Invalid input(s): CREATININ  URINALYSIS:  Recent Labs   Lab 07/02/19  2122   Color, UA Yellow   Specific Gravity, UA 1.010   pH, UA 6.0   Protein, UA 2+ A   Bacteria Occasional   Nitrite, UA Negative   Leukocytes, UA 1+ A   Urobilinogen, UA Negative   Hyaline Casts, UA 0      LIPIDS:      TSH:      A1C:        Assessment/Plan     L Raisa Green is a 69 y.o.female with:    1. Type 2 diabetes mellitus with diabetic polyneuropathy, without long-term current use of insulin  - CBC auto differential; Future  - Comprehensive metabolic panel; Future  - Hemoglobin A1c; Future  - Lipid panel; Future  - Microalbumin/creatinine urine ratio; Future  - CBC auto differential  - Comprehensive metabolic panel  - Hemoglobin A1c  - Lipid panel  - Microalbumin/creatinine urine ratio    2. Hyperlipidemia, mixed  - CBC auto differential; Future  - Comprehensive metabolic panel; Future  - Hemoglobin A1c; Future  - Lipid panel; Future  - Microalbumin/creatinine urine ratio; Future  - CBC auto differential  - Comprehensive metabolic panel  -  Hemoglobin A1c  - Lipid panel  - Microalbumin/creatinine urine ratio    3. GERD without esophagitis  - CBC auto differential; Future  - Comprehensive metabolic panel; Future  - Hemoglobin A1c; Future  - Lipid panel; Future  - Microalbumin/creatinine urine ratio; Future  - CBC auto differential  - Comprehensive metabolic panel  - Hemoglobin A1c  - Lipid panel  - Microalbumin/creatinine urine ratio    4. Mild recurrent major depression    5. Primary insomnia    6. Chronic neck pain    7. DDD (degenerative disc disease), cervical    8. Celiac disease    9. Screening mammogram, encounter for  - Mammo Digital Screening Bilat; Future     Chronic conditions status updated as per HPI.  Other than changes above, cont current medications and maintain follow up with specialists.  Return to clinic in 6 months.    Pavel Calixto MD  [facility]

## 2019-10-21 ENCOUNTER — TELEPHONE (OUTPATIENT)
Dept: FAMILY MEDICINE | Facility: CLINIC | Age: 69
End: 2019-10-21

## 2019-10-28 ENCOUNTER — TELEPHONE (OUTPATIENT)
Dept: FAMILY MEDICINE | Facility: CLINIC | Age: 69
End: 2019-10-28

## 2019-10-28 NOTE — TELEPHONE ENCOUNTER
----- Message from Cassie Parada sent at 10/28/2019  2:12 PM CDT -----  Contact: patient  Patient called again to find out why her test strips are not being sent to pharmacy.    Please call [phone] to discuss immediately.

## 2019-10-28 NOTE — TELEPHONE ENCOUNTER
[facility] faxed request for accu-chek compact drum strip. Pt test twice daily #102 with 3 refills.

## 2019-10-30 ENCOUNTER — TELEPHONE (OUTPATIENT)
Dept: FAMILY MEDICINE | Facility: CLINIC | Age: 69
End: 2019-10-30

## 2019-10-30 RX ORDER — INSULIN PUMP SYRINGE, 3 ML
EACH MISCELLANEOUS
Qty: 1 EACH | Refills: 0 | Status: SHIPPED | OUTPATIENT
Start: 2019-10-30 | End: 2021-06-29 | Stop reason: SDUPTHER

## 2019-10-30 NOTE — TELEPHONE ENCOUNTER
----- Message from Cassie Parada sent at 10/30/2019  8:36 AM CDT -----  Contact: patient  Patient called to speak with the nurse in regard to her diabetic testing equipment.  She either needs a prescription for a new meter or would like to change brands of meter.  If the changes is done, she just needs the test strip prescriptions, not the one for the meter.    Please call [phone] to discuss today.

## 2019-10-31 ENCOUNTER — OFFICE VISIT (OUTPATIENT)
Dept: OBSTETRICS AND GYNECOLOGY | Facility: CLINIC | Age: 69
End: 2019-10-31
Payer: MEDICARE

## 2019-10-31 ENCOUNTER — TELEPHONE (OUTPATIENT)
Dept: FAMILY MEDICINE | Facility: CLINIC | Age: 69
End: 2019-10-31

## 2019-10-31 ENCOUNTER — LAB VISIT (OUTPATIENT)
Dept: LAB | Facility: HOSPITAL | Age: 69
End: 2019-10-31
Attending: OBSTETRICS & GYNECOLOGY
Payer: MEDICARE

## 2019-10-31 VITALS
BODY MASS INDEX: 24.07 KG/M2 | DIASTOLIC BLOOD PRESSURE: 70 MMHG | HEIGHT: 65 IN | WEIGHT: 144.5 LBS | SYSTOLIC BLOOD PRESSURE: 120 MMHG

## 2019-10-31 DIAGNOSIS — R31.0 GROSS HEMATURIA: ICD-10-CM

## 2019-10-31 DIAGNOSIS — M54.50 LOW BACK PAIN, UNSPECIFIED BACK PAIN LATERALITY, UNSPECIFIED CHRONICITY, UNSPECIFIED WHETHER SCIATICA PRESENT: ICD-10-CM

## 2019-10-31 DIAGNOSIS — Z01.419 ENCOUNTER FOR GYNECOLOGICAL EXAMINATION: Primary | ICD-10-CM

## 2019-10-31 DIAGNOSIS — L65.9 ALOPECIA: ICD-10-CM

## 2019-10-31 DIAGNOSIS — N39.0 RECURRENT UTI: Primary | ICD-10-CM

## 2019-10-31 LAB
BILIRUB SERPL-MCNC: NORMAL MG/DL
BLOOD URINE, POC: NORMAL
COLOR, POC UA: YELLOW
GLUCOSE UR QL STRIP: NORMAL
KETONES UR QL STRIP: NORMAL
LEUKOCYTE ESTERASE URINE, POC: NORMAL
NITRITE, POC UA: NORMAL
PH, POC UA: NORMAL
PROTEIN, POC: NORMAL
SPECIFIC GRAVITY, POC UA: NORMAL
T4 FREE SERPL-MCNC: 0.83 NG/DL (ref 0.71–1.51)
TESTOST SERPL-MCNC: 11 NG/DL (ref 5–73)
TSH SERPL DL<=0.005 MIU/L-ACNC: 1.32 UIU/ML (ref 0.4–4)
UROBILINOGEN, POC UA: NORMAL

## 2019-10-31 PROCEDURE — 99999 PR PBB SHADOW E&M-EST. PATIENT-LVL III: ICD-10-PCS | Mod: PBBFAC,,, | Performed by: OBSTETRICS & GYNECOLOGY

## 2019-10-31 PROCEDURE — G0101 PR CA SCREEN;PELVIC/BREAST EXAM: ICD-10-PCS | Mod: S$GLB,,, | Performed by: OBSTETRICS & GYNECOLOGY

## 2019-10-31 PROCEDURE — 84439 ASSAY OF FREE THYROXINE: CPT

## 2019-10-31 PROCEDURE — G0101 CA SCREEN;PELVIC/BREAST EXAM: HCPCS | Mod: S$GLB,,, | Performed by: OBSTETRICS & GYNECOLOGY

## 2019-10-31 PROCEDURE — 84403 ASSAY OF TOTAL TESTOSTERONE: CPT

## 2019-10-31 PROCEDURE — 84443 ASSAY THYROID STIM HORMONE: CPT

## 2019-10-31 PROCEDURE — 81002 URINALYSIS NONAUTO W/O SCOPE: CPT | Mod: S$GLB,,, | Performed by: OBSTETRICS & GYNECOLOGY

## 2019-10-31 PROCEDURE — 99999 PR PBB SHADOW E&M-EST. PATIENT-LVL III: CPT | Mod: PBBFAC,,, | Performed by: OBSTETRICS & GYNECOLOGY

## 2019-10-31 PROCEDURE — 81002 POCT URINE DIPSTICK WITHOUT MICROSCOPE: ICD-10-PCS | Mod: S$GLB,,, | Performed by: OBSTETRICS & GYNECOLOGY

## 2019-10-31 RX ORDER — LANCETS
EACH MISCELLANEOUS
Refills: 3 | COMMUNITY
Start: 2019-10-18 | End: 2020-05-05 | Stop reason: SDUPTHER

## 2019-10-31 NOTE — PROGRESS NOTES
CCChief Complaint Well woman exam:  Well Woman (BEAU No bso, Last mammo Dr Ramirez, Last colonoscopy 2018 [facility]. C/o lower back pain on one side, UA today negative)      ANA ROSA Green is a 69 y.o. female  presents for a well woman exam.    She is established. Has been dx celiac and diverticulosis  S/p BEAU (no BSO)    C/o hair loss worse over the past year stuart in back of head    No abd pain, bloating, VB or dc  Patient states that she presented to the emergency room a few months ago with hematuria/gross hematuria.  Was told that she had a UTI.  Upon investigation through her chart she did indeed have blood and leukocytes in her urine but her urine culture was negative.  Informed patient today that she does not have a UTI and I was concerned about why she would have blood in her urine.  UA today is negative for blood.  But I do feel like she needs to see a urologist to find ideology of the blood in her urine.  Patient states that she has a  urologist in [place] that she would like to see.     LMP: none  S/p Hyst  Last pap: s/p Hyst No pap needed   Last MMG: MMG Dr ramirez ordered  Last colonoscopy: 1018 Dr Amaral      Current Outpatient Medications:     ACCU-CHEK COMPACT PLUS TEST Strp, , Disp: , Rfl: 3    ACCU-CHEK SOFTCLIX LANCETS Misc, , Disp: , Rfl: 3    atorvastatin (LIPITOR) 40 MG tablet, Take 1 tablet (40 mg total) by mouth once daily., Disp: 90 tablet, Rfl: 3    blood-glucose meter kit, Use as instructed.  Accu-chek Compact, Disp: 1 each, Rfl: 0    calcium carbonate/vitamin D3 (CALTRATE WITH VITAMIN D3 ORAL), Take by mouth., Disp: , Rfl:     cinnamon bark (CINNAMON ORAL), Take by mouth., Disp: , Rfl:     coenzyme Q10 400 mg Cap, Take by mouth., Disp: , Rfl:     flaxseed oil 1,000 mg Cap, once a day, Disp: , Rfl:     gabapentin (NEURONTIN) 300 MG capsule, , Disp: , Rfl: 3    gabapentin (NEURONTIN) 400 MG capsule, , Disp: , Rfl: 3    gluc-condr-om3-dha-epa-fish-st (GLUCOSAMINE CHONDROITIN  PLUS) 706-063-71-54 mg Cap, once a day, Disp: , Rfl:     KRILL OIL ORAL, Take by mouth., Disp: , Rfl:     metFORMIN (GLUCOPHAGE) 500 MG tablet, Take 1 tablet (500 mg total) by mouth 2 (two) times daily with meals., Disp: 180 tablet, Rfl: 3    MILK THISTLE ORAL, Take by mouth., Disp: , Rfl:     pantoprazole (PROTONIX) 40 MG tablet, , Disp: , Rfl: 11    temazepam (RESTORIL) 30 mg capsule, Take 1 capsule (30 mg total) by mouth nightly as needed for Insomnia., Disp: 30 capsule, Rfl: 3    venlafaxine (EFFEXOR-XR) 75 MG 24 hr capsule, Take 1 capsule (75 mg total) by mouth once daily., Disp: 90 capsule, Rfl: 3    vitamins  A,C,E-zinc-copper (PRESERVISION AREDS) 14,[phone] unit-mg-unit Cap, Take 1 tablet by mouth once daily., Disp: , Rfl:     Past Medical History:   Diagnosis Date    Anxiety     Celiac disease 2018    Celiac disease     Depression     Family history of breast cancer in mother      at age 68    H/O mammogram 2018    Bir[facility] 2/B  (Dr Deleon, Orders MMG yearly)     Hx of bone density study 2014    Normal     Hyperlipidemia     Hypertension     Menopause     Peptic ulcer     Reflux esophagitis     Vaginal Pap smear 2014    Pap/Hpv Negative        Past Surgical History:   Procedure Laterality Date    APPENDECTOMY      CARPAL TUNNEL RELEASE Bilateral 2017    ,     COLONOSCOPY  2018    Normal  (Cornell Velazco)     DILATION AND CURETTAGE OF UTERUS  1986    DUPUYTREN CONTRACTURE RELEASE Bilateral 2017    HYSTERECTOMY  1987    BEAU w/ appy, no BSO     SHOULDER SURGERY   &     right rotator cuff    TONSILLECTOMY      UPPER GASTROINTESTINAL ENDOSCOPY  2018       OB History    Para Term  AB Living   0 0 0 0 0 0   SAB TAB Ectopic Multiple Live Births   0 0 0 0 0       Family History   Problem Relation Age of Onset    Breast cancer Mother     Cancer Mother     Hyperlipidemia Sister     Breast cancer Paternal Aunt      "Cancer Paternal Aunt        Social History     Tobacco Use    Smoking status: Never Smoker    Smokeless tobacco: Never Used   Substance Use Topics    Alcohol use: No    Drug use: No         ROS:    GENERAL: Denies weight gain or weight loss. Feeling well overall.   SKIN: Denies rash or lesions.   HEENT: Denies headaches, or vision changes.   CARDIOVASCULAR: Denies palpitations or left sided chest pain.   RESPIRATORY: Denies shortness of breath or dyspnea on exertion.  BREASTS: Denies pain, lumps, or nipple discharge.   ABDOMEN: Denies abdominal pain, constipation, diarrhea, nausea, vomiting, change in appetite or rectal bleeding.   URINARY: Denies frequency, dysuria, hematuria.  NEUROLOGIC: Denies syncope or weakness.   PSYCHIATRIC: Denies depression, anxiety or mood swings.    Physical Exam:  /70   Ht 5' 5" (1.651 m)   Wt 65.5 kg (144 lb 8.2 oz)   LMP  (LMP Unknown) Comment: BEAU/ NO BSO  1986  BMI 24.05 kg/m²     APPEARANCE: Well nourished, well developed, in no acute distress.  AFFECT: WNL, alert and oriented x 3  SKIN: No acne or hirsutism  BREASTS: Symmetrical, no skin changes.                     No nipple discharge. No palpable masses bilaterally  NODES: No inguinal nor axillary LAD  ABDOMEN: soft Non tender Non distended No masses  PELVIC:   Normal external genitalia without lesions.   Normal urethral meatus. No signif cystocele or rectocele.  Vagina atrophic without lesions or discharge.  Cuff intact  Cervix absent  Adnexa without masses or tenderness.    EXTREMITIES: No edema.    ASSESSMENT AND PLAN  ANA ROSA PARK was seen today for well woman.    Diagnoses and all orders for this visit:    Encounter for gynecological examination     Alopecia  -     TSH; Future  -     T4, free; Future  -     Testosterone; Future  Refer to Dr Tiffany Pacheco for eval     Gross hematuria   -   POCT urine dipstick withou t microscope   Refer to Urology for eval       Patient was counseled today on A.C.S. Pap guidelines " and recommendations for yearly pelvic exams, mammograms and monthly self breast exams; to see her PCP for other health maintenance.     Patient encouraged to register for portal and results will be sent via portal.    Follow up in about 1 year (around 10/31/2020).         Health Maintenance   Topic Date Due    Hepatitis C Screening  1950    Lipid Panel  1950    Hemoglobin A1c  1950    Foot Exam  08/02/1960    Eye Exam  08/02/1960    Urine Microalbumin  08/02/1960    TETANUS VACCINE  08/02/1968    Pneumococcal Vaccine (65+ Low/Medium Risk) (1 of 2 - PCV13) 08/02/2015    DEXA SCAN  04/07/2017    Mammogram  08/06/2019    Low Dose Statin  10/31/2020    Colonoscopy  04/02/2028

## 2019-10-31 NOTE — TELEPHONE ENCOUNTER
Patient requesting a referral for urology per her OBGYN. She went to the ER and the treated her for a UTI in the office with blood in her urine but the culture came back negative. She tried to make an appointment with the urologist but they would not take her recommendations from the OBGYN they want a referral from her primary care doctor.

## 2019-10-31 NOTE — TELEPHONE ENCOUNTER
----- Message from Agnes Holly sent at 10/31/2019  1:16 PM CDT -----  Contact: Self [phone]  Patient is requesting to talk to nurse.

## 2019-11-01 ENCOUNTER — TELEPHONE (OUTPATIENT)
Dept: FAMILY MEDICINE | Facility: CLINIC | Age: 69
End: 2019-11-01

## 2019-11-01 NOTE — TELEPHONE ENCOUNTER
I called to schedule her for a urology referral. Pt kept asking about her lab results. She is looking for them. States she went to [facility] in [place].

## 2019-11-01 NOTE — PROGRESS NOTES
please let her know labs are normal   Make sure her PCP evaluates labs he ordered at [facility]  Also  Rec Viviscal OTC specifically vitamin for hair loss and to F/u with derm Dr Tiffany Pacheco

## 2019-11-05 ENCOUNTER — OFFICE VISIT (OUTPATIENT)
Dept: UROLOGY | Facility: CLINIC | Age: 69
End: 2019-11-05
Payer: MEDICARE

## 2019-11-05 VITALS
SYSTOLIC BLOOD PRESSURE: 129 MMHG | TEMPERATURE: 99 F | WEIGHT: 144 LBS | HEIGHT: 65 IN | DIASTOLIC BLOOD PRESSURE: 83 MMHG | RESPIRATION RATE: 17 BRPM | OXYGEN SATURATION: 98 % | BODY MASS INDEX: 23.99 KG/M2 | HEART RATE: 65 BPM

## 2019-11-05 DIAGNOSIS — R35.0 URINARY FREQUENCY: ICD-10-CM

## 2019-11-05 DIAGNOSIS — R39.15 URINARY URGENCY: ICD-10-CM

## 2019-11-05 DIAGNOSIS — N39.3 SUI (STRESS URINARY INCONTINENCE, FEMALE): ICD-10-CM

## 2019-11-05 DIAGNOSIS — R31.0 GROSS HEMATURIA: Primary | ICD-10-CM

## 2019-11-05 DIAGNOSIS — N39.41 URGE URINARY INCONTINENCE: ICD-10-CM

## 2019-11-05 LAB
BILIRUB SERPL-MCNC: NORMAL MG/DL
BLOOD URINE, POC: NORMAL
COLOR, POC UA: YELLOW
GLUCOSE UR QL STRIP: NORMAL
KETONES UR QL STRIP: NORMAL
LEUKOCYTE ESTERASE URINE, POC: NORMAL
NITRITE, POC UA: NORMAL
PH, POC UA: 5
PROTEIN, POC: NORMAL
SPECIFIC GRAVITY, POC UA: 1.01
UROBILINOGEN, POC UA: 0.2

## 2019-11-05 PROCEDURE — 1101F PR PT FALLS ASSESS DOC 0-1 FALLS W/OUT INJ PAST YR: ICD-10-PCS | Mod: CPTII,S$GLB,, | Performed by: NURSE PRACTITIONER

## 2019-11-05 PROCEDURE — 87086 URINE CULTURE/COLONY COUNT: CPT

## 2019-11-05 PROCEDURE — 81002 POCT URINE DIPSTICK WITHOUT MICROSCOPE: ICD-10-PCS | Mod: S$GLB,,, | Performed by: NURSE PRACTITIONER

## 2019-11-05 PROCEDURE — 1101F PT FALLS ASSESS-DOCD LE1/YR: CPT | Mod: CPTII,S$GLB,, | Performed by: NURSE PRACTITIONER

## 2019-11-05 PROCEDURE — 99999 PR PBB SHADOW E&M-EST. PATIENT-LVL V: CPT | Mod: PBBFAC,,, | Performed by: NURSE PRACTITIONER

## 2019-11-05 PROCEDURE — 99213 PR OFFICE/OUTPT VISIT, EST, LEVL III, 20-29 MIN: ICD-10-PCS | Mod: 25,S$GLB,, | Performed by: NURSE PRACTITIONER

## 2019-11-05 PROCEDURE — 81002 URINALYSIS NONAUTO W/O SCOPE: CPT | Mod: S$GLB,,, | Performed by: NURSE PRACTITIONER

## 2019-11-05 PROCEDURE — 99213 OFFICE O/P EST LOW 20 MIN: CPT | Mod: 25,S$GLB,, | Performed by: NURSE PRACTITIONER

## 2019-11-05 PROCEDURE — 99999 PR PBB SHADOW E&M-EST. PATIENT-LVL V: ICD-10-PCS | Mod: PBBFAC,,, | Performed by: NURSE PRACTITIONER

## 2019-11-05 RX ORDER — GLUCOSAMINE/CHONDR SU A SOD 750-600 MG
1500 TABLET ORAL
COMMUNITY
Start: 2012-11-30 | End: 2023-05-12

## 2019-11-05 NOTE — PROGRESS NOTES
Subjective:       Patient ID: ANA ROSA Green is a 69 y.o. female.    Chief Complaint: Hematuria (referred by Dr. Serrano); Urinary Frequency; and Urinary Urgency    Patient is new to me. She is a 68 yo WF who is here today for evaluation of gross hematuria. Referred by her OBGYN (Dr. Serrano). Patient reports in July she seen bright red blood on her toilet paper after urinating. She doesn't recall if she seen blood in the toilet commode. Since that episode, she has not seen blood in her urine or on toilet paper. Patient also reports urinary leakage with laughing, coughing, sneezing, and urgency. This is not a new issue for patient. She wears a pad and changes twice daily. Scant to moderate amount of urine noted on pads.     Hematuria   This is a new problem. Episode onset: July 2019. The problem has been resolved since onset. She describes the hematuria as gross hematuria. She reports no clotting in her urine stream. Her pain is at a severity of 0/10. She is experiencing no pain. She describes her urine color as bright red. Irritative symptoms include frequency, nocturia (x1) and urgency (with leakage). Associated symptoms include flank pain (right; resolved). Pertinent negatives include no abdominal pain, chills, dysuria, fever, genital pain, hesitancy, inability to urinate, nausea or vomiting. Her past medical history is significant for UTI. There is no history of hypertension,  trauma, kidney stones or tobacco use.     Review of Systems   Constitutional: Positive for appetite change (decreased) and fatigue. Negative for chills and fever.   Gastrointestinal: Positive for abdominal distention (bloating) and constipation (taking miralax). Negative for abdominal pain, diarrhea, nausea and vomiting.   Genitourinary: Positive for flank pain (right; resolved), frequency, hematuria, nocturia (x1) and urgency (with leakage). Negative for decreased urine volume, difficulty urinating, dysuria, hesitancy, vaginal bleeding,  vaginal discharge and vaginal pain.        Feeling of incomplete bladder emptying.  Nocturia x1   Psychiatric/Behavioral: Negative.        Objective:      Physical Exam   Constitutional: She is oriented to person, place, and time. She appears well-developed and well-nourished. No distress.   HENT:   Head: Normocephalic and atraumatic.   Eyes: Pupils are equal, round, and reactive to light. EOM are normal.   Neck: Normal range of motion.   Cardiovascular: Normal rate.   Pulmonary/Chest: Effort normal. No respiratory distress.   Abdominal: Soft. There is no tenderness.   Genitourinary:   Genitourinary Comments: PVR = 33 mL   Musculoskeletal: Normal range of motion. She exhibits no edema.   Neurological: She is alert and oriented to person, place, and time. Coordination normal.   Skin: Skin is warm and dry.   Psychiatric: She has a normal mood and affect. Her behavior is normal. Judgment and thought content normal.   Nursing note and vitals reviewed.      Assessment:       1. Gross hematuria    2. Urinary frequency    3. Urinary urgency    4. TANIA (stress urinary incontinence, female)    5. Urge urinary incontinence        Plan:       ANA ROSA PARK was seen today for hospital follow up and urinary tract infection.    Diagnoses and all orders for this visit:    Gross hematuria  -     POCT URINE DIPSTICK WITHOUT MICROSCOPE  -     Urine culture  -     US Retroperitoneal Complete (Kidney and; Future    Urinary frequency  -     POCT URINE DIPSTICK WITHOUT MICROSCOPE  -     Urine culture    Urinary urgency  -     POCT URINE DIPSTICK WITHOUT MICROSCOPE  -     Urine culture    TANIA (stress urinary incontinence, female)  -     POCT URINE DIPSTICK WITHOUT MICROSCOPE  -     Urine culture    Urge urinary incontinence  -     POCT URINE DIPSTICK WITHOUT MICROSCOPE  -     Urine culture    Other order  1. Bladder scan (PVR)    Follow-up pending urine cx and U/S results.    Noe Guevara NP

## 2019-11-05 NOTE — PATIENT INSTRUCTIONS
1. Bladder scan (PVR)  2. Urine dipstick  3. Urine cx  4. U/S retroperitoneal complete  5. Follow-up pending urine cx and U/S results.

## 2019-11-05 NOTE — LETTER
November 5, 2019      Pavel Calixto MD  [address]  [address]  [facility] [address]           [facility]  [id number] [place] [address], [address]  [facility] [address]  Phone: [phone]  Fax: [phone]          Patient: ANA ROSA Green   MR Number: [id number]   YOB: 1950   Date of Visit: 11/5/2019       Dear Dr. Pavel Calixto:    Thank you for referring ANA ROSA Green to me for evaluation. Attached you will find relevant portions of my assessment and plan of care.    If you have questions, please do not hesitate to call me. I look forward to following ANA ROSA Green along with you.    Sincerely,    Noe Guevara, NP    Enclosure  CC:  No Recipients    If you would like to receive this communication electronically, please contact [email or web address] or [phone] to request more information on [facility] access.    For providers and/or their staff who would like to refer a patient to [facility], please contact us through our one-stop-shop provider referral line, [facility], at [phone].    If you feel you have received this communication in error or would no longer like to receive these types of communications, please e-mail [email or web address]

## 2019-11-07 ENCOUNTER — PATIENT MESSAGE (OUTPATIENT)
Dept: UROLOGY | Facility: CLINIC | Age: 69
End: 2019-11-07

## 2019-11-07 ENCOUNTER — TELEPHONE (OUTPATIENT)
Dept: UROLOGY | Facility: CLINIC | Age: 69
End: 2019-11-07

## 2019-11-07 LAB — BACTERIA UR CULT: NO GROWTH

## 2019-11-07 NOTE — TELEPHONE ENCOUNTER
----- Message from Noe Guevara NP sent at 11/7/2019  9:15 AM CST -----  Please inform patient her urine cx was normal. She does not have a UTI. Will f/u with patient after her U/S is performed.

## 2019-11-08 ENCOUNTER — PATIENT MESSAGE (OUTPATIENT)
Dept: UROLOGY | Facility: CLINIC | Age: 69
End: 2019-11-08

## 2019-11-08 ENCOUNTER — CLINICAL SUPPORT (OUTPATIENT)
Dept: UROLOGY | Facility: CLINIC | Age: 69
End: 2019-11-08
Payer: MEDICARE

## 2019-11-08 ENCOUNTER — TELEPHONE (OUTPATIENT)
Dept: UROLOGY | Facility: CLINIC | Age: 69
End: 2019-11-08

## 2019-11-08 DIAGNOSIS — R31.9 HEMATURIA, UNSPECIFIED TYPE: Primary | ICD-10-CM

## 2019-11-08 PROCEDURE — 88112 CYTOPATH CELL ENHANCE TECH: CPT | Mod: 26,,, | Performed by: PATHOLOGY

## 2019-11-08 PROCEDURE — 88112 CYTOPATH CELL ENHANCE TECH: CPT | Performed by: PATHOLOGY

## 2019-11-08 PROCEDURE — 88112 CYTOLOGY SPECIMEN-URINE: ICD-10-PCS | Mod: 26,,, | Performed by: PATHOLOGY

## 2019-11-08 NOTE — TELEPHONE ENCOUNTER
----- Message from Noe Guevara NP sent at 11/7/2019  3:17 PM CST -----  Please inform patient her renal and bladder u/s was normal. Urine specimen is needed for urine cytology and urovysion test.

## 2019-11-11 ENCOUNTER — LAB VISIT (OUTPATIENT)
Dept: LAB | Facility: HOSPITAL | Age: 69
End: 2019-11-11
Attending: NURSE PRACTITIONER
Payer: MEDICARE

## 2019-11-11 DIAGNOSIS — R31.9 HEMATURIA, UNSPECIFIED TYPE: ICD-10-CM

## 2019-11-11 PROCEDURE — 88120 CYTP URNE 3-5 PROBES EA SPEC: CPT

## 2019-11-15 ENCOUNTER — PATIENT MESSAGE (OUTPATIENT)
Dept: UROLOGY | Facility: CLINIC | Age: 69
End: 2019-11-15

## 2019-11-15 LAB
FUROC - INTERPRETATION: NORMAL
FUROC - REASON FOR REFERRAL: NORMAL
FUROC - RELEASED BY: NORMAL
FUROC - RESULT SUMMARY: NEGATIVE
FUROC - RESULT: NORMAL
FUROC - SPECIMEN: NORMAL
SPECIMEN SOURCE: NORMAL

## 2019-12-06 LAB
LEFT EYE DM RETINOPATHY: NEGATIVE
RIGHT EYE DM RETINOPATHY: NEGATIVE

## 2019-12-18 ENCOUNTER — TELEPHONE (OUTPATIENT)
Dept: OBSTETRICS AND GYNECOLOGY | Facility: CLINIC | Age: 69
End: 2019-12-18

## 2020-01-31 ENCOUNTER — TELEPHONE (OUTPATIENT)
Dept: URGENT CARE | Facility: CLINIC | Age: 70
End: 2020-01-31

## 2020-01-31 ENCOUNTER — OFFICE VISIT (OUTPATIENT)
Dept: URGENT CARE | Facility: CLINIC | Age: 70
End: 2020-01-31
Payer: MEDICARE

## 2020-01-31 VITALS — HEIGHT: 65 IN | BODY MASS INDEX: 24.16 KG/M2 | WEIGHT: 145 LBS

## 2020-01-31 DIAGNOSIS — R05.9 COUGH: ICD-10-CM

## 2020-01-31 DIAGNOSIS — R52 BODY ACHES: ICD-10-CM

## 2020-01-31 DIAGNOSIS — R50.9 FEVER, UNSPECIFIED: ICD-10-CM

## 2020-01-31 DIAGNOSIS — B34.9 VIRAL SYNDROME: Primary | ICD-10-CM

## 2020-01-31 LAB
CTP QC/QA: YES
CTP QC/QA: YES
FLUAV AG NPH QL: NEGATIVE
FLUBV AG NPH QL: NEGATIVE
MOLECULAR STREP A: NEGATIVE

## 2020-01-31 PROCEDURE — 87651 POCT STREP A MOLECULAR: ICD-10-PCS | Mod: QW,S$GLB,, | Performed by: NURSE PRACTITIONER

## 2020-01-31 PROCEDURE — 99214 OFFICE O/P EST MOD 30 MIN: CPT | Mod: 25,S$GLB,, | Performed by: NURSE PRACTITIONER

## 2020-01-31 PROCEDURE — 87804 INFLUENZA ASSAY W/OPTIC: CPT | Mod: QW,S$GLB,, | Performed by: NURSE PRACTITIONER

## 2020-01-31 PROCEDURE — 99214 PR OFFICE/OUTPT VISIT, EST, LEVL IV, 30-39 MIN: ICD-10-PCS | Mod: 25,S$GLB,, | Performed by: NURSE PRACTITIONER

## 2020-01-31 PROCEDURE — 87804 POCT INFLUENZA A/B: ICD-10-PCS | Mod: QW,S$GLB,, | Performed by: NURSE PRACTITIONER

## 2020-01-31 PROCEDURE — 87651 STREP A DNA AMP PROBE: CPT | Mod: QW,S$GLB,, | Performed by: NURSE PRACTITIONER

## 2020-01-31 RX ORDER — BENZONATATE 100 MG/1
100 CAPSULE ORAL 3 TIMES DAILY PRN
Qty: 20 CAPSULE | Refills: 0 | Status: SHIPPED | OUTPATIENT
Start: 2020-01-31 | End: 2020-02-07

## 2020-01-31 RX ORDER — MELOXICAM 15 MG/1
15 TABLET ORAL DAILY
Qty: 14 TABLET | Refills: 0 | Status: SHIPPED | OUTPATIENT
Start: 2020-01-31 | End: 2020-02-14

## 2020-01-31 RX ORDER — PROMETHAZINE HYDROCHLORIDE AND DEXTROMETHORPHAN HYDROBROMIDE 6.25; 15 MG/5ML; MG/5ML
5 SYRUP ORAL EVERY 6 HOURS PRN
Qty: 100 ML | Refills: 0 | Status: SHIPPED | OUTPATIENT
Start: 2020-01-31 | End: 2020-02-07

## 2020-01-31 RX ORDER — PERPHENAZINE 16 MG
TABLET ORAL
COMMUNITY
End: 2023-05-12

## 2020-01-31 RX ORDER — AZELASTINE 1 MG/ML
1 SPRAY, METERED NASAL 2 TIMES DAILY
Qty: 30 ML | Refills: 0 | Status: SHIPPED | OUTPATIENT
Start: 2020-01-31 | End: 2020-06-30

## 2020-01-31 NOTE — TELEPHONE ENCOUNTER
When checking patient in at  patient was asked if traveled outside of country in last 30 days and answered yes because she just came back from trip to [place] on Jan 29, 2020. Patient was experiencing fever/cough/chills. System prompted me to call [facility] and put patient in room. Spoke to Pranav at [facility] at phone # [phone]. He took patient's personal information and informed me that patient was ok to be seen because they were only interested in patient's who had recently traveled to [place]. Patient was seen by N.PRaffy On duty Judi Bush.

## 2020-01-31 NOTE — PATIENT INSTRUCTIONS
"Always follow your healthcare professional's instructions.    You have received urgent care diagnosis and treatment and you may be released before all of your medical problems are known or treated. Unless you have been given a referral, you (the patient), will arrange for follow-up care as instructed.     If you have been given a referral, [facility] will contact you (their phone number is [phone]). If they do NOT call you by the end of the business day, please call them the following day.    TREATMENT: YOUR TREATMENT IS AIMED AT SYMPTOM MANAGEMENT.     You have been diagnosed with Viral Syndrome   A viral illness may cause a number of symptoms. The symptoms depend on the part of the body that the virus affects. If it settles in your nose, throat, and lungs, it may cause cough, sore throat, congestion, and sometimes headache. If it settles in your stomach and intestinal tract, it may cause vomiting and diarrhea. Sometimes it causes vague symptoms like "aching all over," feeling tired, loss of appetite, or fever.  A viral illness usually lasts 1 to 2 weeks, but sometimes it lasts longer. In some cases, a more serious infection can look like a viral syndrome in the first few days of the illness. You may need another exam and additional tests to know the difference.     Home care  Follow these guidelines for taking care of yourself at home:  · If symptoms are severe, rest at home for the first 2 to 3 days.  · Stay away from cigarette smoke - both your smoke and the smoke from others.  · You may use over-the-counter acetaminophen or ibuprofen for fever, muscle aching, and headache, unless another medicine was prescribed for this. If you have chronic liver or kidney disease or ever had a stomach ulcer or GI bleeding, talk with your doctor before using these medicines. No one who is younger than 18 and ill with a fever should take aspirin. It may cause severe disease or death.  · Your appetite may be poor, so a light " diet is fine. Avoid dehydration by drinking 8 to 12 8-ounce glasses of fluids each day. This may include water; orange juice; lemonade; apple, grape, and cranberry juice; clear fruit drinks; electrolyte replacement and sports drinks; and decaffeinated teas and coffee. If you have been diagnosed with a kidney disease, ask your doctor how much and what types of fluids you should drink to prevent dehydration. If you have kidney disease, drinking too much fluid can cause it build up in the your body and be dangerous to your health.  · Over-the-counter remedies won't shorten the length of the illness but may be helpful for cough, sore throat; and nasal and sinus congestion. Don't use decongestants if you have high blood pressure.    Follow-up care  Follow up with your healthcare provider if your symptoms continue for more than 7 days.    Fever Cough Body Aches Nausea and Vomiting Diarrhea Congestion or Runny Nose    OTC Tylenol   OTC NSAIDs  Tessalon Pearls   Phenergan DM   OTC cough medications  Mobic   OTC Tylenol   OTC NSAIDs  Zofran   Phenergan   OTC Emetrol  DO NOT take ant-diarrheal medications  OTC Claritin, Zyrtec, Allegra, OR Xyzal   OTC Flonase   OTC Benadryl      Cough Allergy Symptoms Asthma Headache or Body Aches   Tessalon Perles are a non-narcotic cough medicine. It works by numbing the throat and lungs, making the cough reflex less active, it is used to relieve coughing during the day. If you have been given Phenergan DM cough syrup, you do NOT need to take both medications at the same time.    Phenergan DM is a combination medication that is used to treat cough. Phenergan DM works like an antihistamine and cough suppressant. This medication will make you sleepy like Benadryl, have a drying effect, and act on a part of the brain (cough center) to reduce the need to cough. DO NOT take Benadryl and Phenergan together. Take over-the-counter claritin, zyrtec, allegra, or xyzal as directed, these  "are antihistamines that will work to dry up secretions/mucus. Antihistamines work to block the effects of a certain natural substance (histamine), which causes allergy symptoms.    Use over-the-counter Flonase or prescribed Azelastine (a nasal antihistamine) to treat runny nose, sneezing, itchy nose, nasal congestion, and postnasal drip.    Proper administration is to "look down at your toes and aim for your nose". The goal is to aim for your nasolabial folds, the creases in your nose. If you feel the medication drip down your throat, you have NOT administered it correctly. If you can "smell the roses" (floral scent), then you have administered it correctly. If you have a history of asthma, expressed a concern about wheezing or have been told you were wheezing during your exam today, you are being given Albuterol. Albuterol is a bronchodilator that relaxes muscles in the airways and increases air flow to the lungs; it is used to treat or prevent bronchospasm, or narrowing of the airways in the lungs.     If you have a history of asthma, expressed a concern about wheezing or have been told you were wheezing during your exam today and are NOT being prescribed Albuterol that is because you have insured me that you have an adequate supply of the drug at home.    Mobic is a nonsteroidal anti-inflammatory drug (NSAID), it is used to treat inflammation. It reduces pain, swelling, and stiffness of the joints. Please do NOT take any other NSAID medications while on this medication, you can take Tylenol if you feel the need. If you were not prescribed an anti-inflammatory medication, and if you do not have any history of stomach/intestinal ulcers, or kidney disease, or are not taking a blood thinner such as Coumadin, Plavix, Pradaxa, Eloquis, or Xaralta for example, it is OK to take over the counter Ibuprofen or Advil or Motrin or Aleve as directed.  Do not take any of these medications on an empty stomach.   "       Dehydration (Adult)  Dehydration occurs when your body loses too much fluid. This may be the result of prolonged vomiting or diarrhea, excessive sweating, or a high fever. It may also happen if you don't drink enough fluid when you're sick or out in the heat. Misuse of diuretics (water pills) can also be a cause.  Symptoms include thirst and decreased urine output. You may also feel dizzy, weak, fatigued, or very drowsy. The diet described below is usually enough to treat dehydration. In some cases, you may need medicine.    Home care  · Drink at least 12 8-ounce glasses of fluid every day to resolve the dehydration. Fluid may include water; orange juice; lemonade; apple, grape, or cranberry juice; clear fruit drinks; electrolyte replacement and sports drinks; and teas and coffee without caffeine. If you have been diagnosed with a kidney disease, ask your doctor how much and what types of fluids you should drink to prevent dehydration. If you have kidney disease, fluid can build up in the body. This can be dangerous to your health.  · If you have a fever, muscle aches, or a headache as a result of a cold or flu, you may take acetaminophen or ibuprofen, unless another medicine was prescribed. If you have chronic liver or kidney disease, or have ever had a stomach ulcer or gastrointestinal bleeding, talk with your health care provider before using these medicines. Don't take aspirin if you are younger than 18 and have a fever. Aspirin raises the chance for severe liver injury.    When to seek medical advice  DEHYDRATION:  · Continued vomiting  · Frequent diarrhea (more than 5 times a day); blood (red or black color) or mucus in diarrhea  · Blood in vomit or stool  · Swollen abdomen or increasing abdominal pain  · Weakness, dizziness, or fainting  · Unusual drowsiness or confusion  · Reduced urine output or extreme thirst    FEVER:  Call your healthcare provider right away if any of these occur:  · Your child is  3 months old or younger and has a fever of 100.4°F (38°C) or higher. Get medical care right away. Fever in a young baby can be a sign of a dangerous infection.  · Your child is of any age and has repeated fevers above 104°F (40°C).  · Your child is younger than 2 years of age and a fever of 100.4°F (38°C) continues for more than 1 day.  · A fever of 100.4°F (38°C) for more than 3 days in anyone older than 2 years of age.       Why didn't I get a steroid today?    The benefits are small and, unlike topical glucocorticoids, systemic glucocorticoids possess a significant side effect profile. Major side effects include:    · Effects immunity predisposing you to getting a more severe infection and increases your white blood cell count. You have the flu, you do not need anything to weaken your immune system right now.  · Young children -- Growth impairment   · Skin consequences: Skin thinning and ecchymoses, Cushingoid appearance (rounded face), acne, weight gain, mild hirsutism, facial erythema, and striae.  · Eye consequences: Cataracts, increased intraocular pressure, exophthalmos.   · Cardiovascular consequences: Fluid retention, premature atherosclerotic disease, and arrhythmias.   · GI consequences: Increased risk for adverse gastrointestinal effects, such as gastritis, ulcer formation, and gastrointestinal bleeding  · Bone and muscle consequences: Osteoporosis, osteonecrosis, and myopathy.  · Neuropsychiatric effects: Mood disorders, psychosis, memory impairment, and   · Metabolic and Endocrine consequences: Suppress the hypothalamic-pituitary-adrenal (HPA) axis and increase blood sugar.     Can I just have a shot instead of pills?  No. If you are sick, you need a course of treatment (not just a one time dose).     Why didn't I get an antibiotic today?  You have been diagnosed with a virus an antibiotic will not help you. Antibiotics work by destroying the cell walls of bacteria, viruses do not have cell walls.  "    When to seek medical advice:    DEHYDRATION:  · Continued vomiting  · Frequent diarrhea (more than 5 times a day); blood (red or black color) or mucus in diarrhea  · Blood in vomit or stool  · Swollen abdomen or increasing abdominal pain  · Reduced urine output or extreme thirst  · Repeated vomiting after the first 4 hours on fluids  · Occasional vomiting for more than 48 hours  · Frequent diarrhea (more than 5 times a day), blood in diarrhea (red or black color), or mucus in diarrhea  · Blood in vomit or stool  · Swollen abdomen or signs of abdominal pain  · No urine for 8 hours, no tears when crying, "sunken" eyes, or dry mouth  · Unusual behavior changes, fussiness, drowsiness, confusion, or seizure  · Fever (see Fever and children, below)  FEVER:  Call your healthcare provider right away if any of these occur:  · Your child is 3 months old or younger and has a fever of 100.4°F (38°C) or higher. Get medical care right away. Fever in a young baby can be a sign of a dangerous infection.  · Your child is of any age and has repeated fevers above 104°F (40°C).  · Your child is younger than 2 years of age and a fever of 100.4°F (38°C) continues for more than 1 day.  · A fever of 100.4°F (38°C) for more than 3 days in anyone older than 2 years of age.  Call 911  Call 911 or your local emergency services number if the child shows any of these symptoms or signs:  · Trouble breathing  · Confusion  · Is very drowsy or difficult to awaken  · Fainting or loss of consciousness  · Rapid heart rate  · Seizure  · Stiff neck     Your provider discussed your plan of care with you during your physical exam. It was reviewed once more by the provider when giving you an after visit summary. If the patient is a minor, the discharge instructions were discussed with an adult and that adult acknowledge their understanding of the provider's teaching.      "

## 2020-01-31 NOTE — PROGRESS NOTES
"Subjective:       Patient ID: ANA ROSA Green is a 69 y.o. female.    Vitals:  height is 5' 5" (1.651 m) and weight is 65.8 kg (145 lb).     Chief Complaint: Cough (fever 100)    Cough   This is a new problem. The current episode started yesterday. The problem has been unchanged. The problem occurs every few minutes. The cough is productive of sputum. Associated symptoms include chills and a fever. Pertinent negatives include no ear pain, eye redness, hemoptysis, myalgias, rash, sore throat, shortness of breath or wheezing. Nothing aggravates the symptoms. Treatments tried: prescription cough medication, aleve D sinus, mucinex. The treatment provided mild relief.       Constitution: Positive for chills and fever. Negative for sweating and fatigue.   HENT: Positive for congestion and sinus pressure. Negative for ear pain, sinus pain, sore throat and voice change.    Neck: Negative for painful lymph nodes.   Eyes: Negative for eye redness.   Respiratory: Positive for cough and sputum production. Negative for chest tightness, bloody sputum, COPD, shortness of breath, stridor, wheezing and asthma.    Gastrointestinal: Negative for nausea and vomiting.   Musculoskeletal: Negative for muscle ache.   Skin: Negative for rash.   Allergic/Immunologic: Negative for seasonal allergies and asthma.   Hematologic/Lymphatic: Negative for swollen lymph nodes.       Objective:      Physical Exam   Constitutional: She is oriented to person, place, and time. She appears well-developed and well-nourished. She is cooperative.  Non-toxic appearance. She does not have a sickly appearance. She does not appear ill. No distress.   HENT:   Head: Normocephalic and atraumatic.   Right Ear: Hearing, external ear and ear canal normal. A middle ear effusion is present.   Left Ear: Hearing, external ear and ear canal normal. A middle ear effusion is present.   Nose: Mucosal edema present. No rhinorrhea or nasal deformity. No epistaxis. Right sinus " exhibits maxillary sinus tenderness. Right sinus exhibits no frontal sinus tenderness. Left sinus exhibits maxillary sinus tenderness. Left sinus exhibits no frontal sinus tenderness.   Mouth/Throat: Uvula is midline and mucous membranes are normal. No trismus in the jaw. Normal dentition. No uvula swelling. Posterior oropharyngeal erythema present. No oropharyngeal exudate or posterior oropharyngeal edema.   Eyes: Conjunctivae and lids are normal. No scleral icterus.   Neck: Trachea normal, full passive range of motion without pain and phonation normal. Neck supple. No neck rigidity. No edema and no erythema present.   Cardiovascular: Normal rate, regular rhythm, normal heart sounds, intact distal pulses and normal pulses.   Pulmonary/Chest: Effort normal and breath sounds normal. No respiratory distress. She has no decreased breath sounds. She has no rhonchi.   Abdominal: Normal appearance.   Musculoskeletal: Normal range of motion. She exhibits no edema or deformity.   Lymphadenopathy:     She has cervical adenopathy.        Right cervical: Superficial cervical adenopathy present.        Left cervical: Superficial cervical adenopathy present.   Neurological: She is alert and oriented to person, place, and time. She exhibits normal muscle tone. Coordination normal.   Skin: Skin is warm, dry, intact, not diaphoretic and not pale.   Psychiatric: She has a normal mood and affect. Her speech is normal and behavior is normal. Judgment and thought content normal. Cognition and memory are normal.   Nursing note and vitals reviewed.        Assessment:       1. Viral syndrome    2. Cough    3. Fever, unspecified    4. Body aches        Plan:         Viral syndrome  -     azelastine (ASTELIN) 137 mcg (0.1 %) nasal spray; 1 spray (137 mcg total) by Nasal route 2 (two) times daily. for 14 days  Dispense: 30 mL; Refill: 0    Cough  -     POCT Influenza A/B  -     POCT Strep A, Molecular  -     benzonatate (TESSALON) 100 MG  "capsule; Take 1 capsule (100 mg total) by mouth 3 (three) times daily as needed for Cough.  Dispense: 20 capsule; Refill: 0  -     promethazine-dextromethorphan (PROMETHAZINE-DM) 6.25-15 mg/5 mL Syrp; Take 5 mLs by mouth every 6 (six) hours as needed (May take every 4 hours, PRN. DoNOT take more than 30 mL in 24 hours.).  Dispense: 100 mL; Refill: 0    Fever, unspecified    Body aches  -     meloxicam (MOBIC) 15 MG tablet; Take 1 tablet (15 mg total) by mouth once daily. for 14 days  Dispense: 14 tablet; Refill: 0      Results for orders placed or performed in visit on 01/31/20   POCT Influenza A/B   Result Value Ref Range    Rapid Influenza A Ag Negative Negative    Rapid Influenza B Ag Negative Negative     Acceptable Yes    POCT Strep A, Molecular   Result Value Ref Range    Molecular Strep A, POC Negative Negative     Acceptable Yes      Patient Instructions     Always follow your healthcare professional's instructions.    You have received urgent care diagnosis and treatment and you may be released before all of your medical problems are known or treated. Unless you have been given a referral, you (the patient), will arrange for follow-up care as instructed.     If you have been given a referral,  will contact you (their phone number is [phone]). If they do NOT call you by the end of the business day, please call them the following day.    TREATMENT: YOUR TREATMENT IS AIMED AT SYMPTOM MANAGEMENT.     You have been diagnosed with Viral Syndrome   A viral illness may cause a number of symptoms. The symptoms depend on the part of the body that the virus affects. If it settles in your nose, throat, and lungs, it may cause cough, sore throat, congestion, and sometimes headache. If it settles in your stomach and intestinal tract, it may cause vomiting and diarrhea. Sometimes it causes vague symptoms like "aching all over," feeling tired, loss of appetite, or fever.  A viral " illness usually lasts 1 to 2 weeks, but sometimes it lasts longer. In some cases, a more serious infection can look like a viral syndrome in the first few days of the illness. You may need another exam and additional tests to know the difference.     Home care  Follow these guidelines for taking care of yourself at home:  · If symptoms are severe, rest at home for the first 2 to 3 days.  · Stay away from cigarette smoke - both your smoke and the smoke from others.  · You may use over-the-counter acetaminophen or ibuprofen for fever, muscle aching, and headache, unless another medicine was prescribed for this. If you have chronic liver or kidney disease or ever had a stomach ulcer or GI bleeding, talk with your doctor before using these medicines. No one who is younger than 18 and ill with a fever should take aspirin. It may cause severe disease or death.  · Your appetite may be poor, so a light diet is fine. Avoid dehydration by drinking 8 to 12 8-ounce glasses of fluids each day. This may include water; orange juice; lemonade; apple, grape, and cranberry juice; clear fruit drinks; electrolyte replacement and sports drinks; and decaffeinated teas and coffee. If you have been diagnosed with a kidney disease, ask your doctor how much and what types of fluids you should drink to prevent dehydration. If you have kidney disease, drinking too much fluid can cause it build up in the your body and be dangerous to your health.  · Over-the-counter remedies won't shorten the length of the illness but may be helpful for cough, sore throat; and nasal and sinus congestion. Don't use decongestants if you have high blood pressure.    Follow-up care  Follow up with your healthcare provider if your symptoms continue for more than 7 days.    Fever Cough Body Aches Nausea and Vomiting Diarrhea Congestion or Runny Nose    OTC Tylenol   OTC NSAIDs  Tessalon Pearls   Phenergan DM   OTC cough medications  Mobic   OTC Tylenol   OTC  "NSAIDs  Zofran   Phenergan   OTC Emetrol  DO NOT take ant-diarrheal medications  OTC Claritin, Zyrtec, Allegra, OR Xyzal   OTC Flonase   OTC Benadryl      Cough Allergy Symptoms Asthma Headache or Body Aches   Tessalon Perles are a non-narcotic cough medicine. It works by numbing the throat and lungs, making the cough reflex less active, it is used to relieve coughing during the day. If you have been given Phenergan DM cough syrup, you do NOT need to take both medications at the same time.    Phenergan DM is a combination medication that is used to treat cough. Phenergan DM works like an antihistamine and cough suppressant. This medication will make you sleepy like Benadryl, have a drying effect, and act on a part of the brain (cough center) to reduce the need to cough. DO NOT take Benadryl and Phenergan together. Take over-the-counter claritin, zyrtec, allegra, or xyzal as directed, these are antihistamines that will work to dry up secretions/mucus. Antihistamines work to block the effects of a certain natural substance (histamine), which causes allergy symptoms.    Use over-the-counter Flonase or prescribed Azelastine (a nasal antihistamine) to treat runny nose, sneezing, itchy nose, nasal congestion, and postnasal drip.    Proper administration is to "look down at your toes and aim for your nose". The goal is to aim for your nasolabial folds, the creases in your nose. If you feel the medication drip down your throat, you have NOT administered it correctly. If you can "smell the roses" (floral scent), then you have administered it correctly. If you have a history of asthma, expressed a concern about wheezing or have been told you were wheezing during your exam today, you are being given Albuterol. Albuterol is a bronchodilator that relaxes muscles in the airways and increases air flow to the lungs; it is used to treat or prevent bronchospasm, or narrowing of the airways in the lungs.     If you have a " history of asthma, expressed a concern about wheezing or have been told you were wheezing during your exam today and are NOT being prescribed Albuterol that is because you have insured me that you have an adequate supply of the drug at home.    Mobic is a nonsteroidal anti-inflammatory drug (NSAID), it is used to treat inflammation. It reduces pain, swelling, and stiffness of the joints. Please do NOT take any other NSAID medications while on this medication, you can take Tylenol if you feel the need. If you were not prescribed an anti-inflammatory medication, and if you do not have any history of stomach/intestinal ulcers, or kidney disease, or are not taking a blood thinner such as Coumadin, Plavix, Pradaxa, Eloquis, or Xaralta for example, it is OK to take over the counter Ibuprofen or Advil or Motrin or Aleve as directed.  Do not take any of these medications on an empty stomach.         Dehydration (Adult)  Dehydration occurs when your body loses too much fluid. This may be the result of prolonged vomiting or diarrhea, excessive sweating, or a high fever. It may also happen if you don't drink enough fluid when you're sick or out in the heat. Misuse of diuretics (water pills) can also be a cause.  Symptoms include thirst and decreased urine output. You may also feel dizzy, weak, fatigued, or very drowsy. The diet described below is usually enough to treat dehydration. In some cases, you may need medicine.    Home care  · Drink at least 12 8-ounce glasses of fluid every day to resolve the dehydration. Fluid may include water; orange juice; lemonade; apple, grape, or cranberry juice; clear fruit drinks; electrolyte replacement and sports drinks; and teas and coffee without caffeine. If you have been diagnosed with a kidney disease, ask your doctor how much and what types of fluids you should drink to prevent dehydration. If you have kidney disease, fluid can build up in the body. This can be dangerous to your  health.  · If you have a fever, muscle aches, or a headache as a result of a cold or flu, you may take acetaminophen or ibuprofen, unless another medicine was prescribed. If you have chronic liver or kidney disease, or have ever had a stomach ulcer or gastrointestinal bleeding, talk with your health care provider before using these medicines. Don't take aspirin if you are younger than 18 and have a fever. Aspirin raises the chance for severe liver injury.    When to seek medical advice  DEHYDRATION:  · Continued vomiting  · Frequent diarrhea (more than 5 times a day); blood (red or black color) or mucus in diarrhea  · Blood in vomit or stool  · Swollen abdomen or increasing abdominal pain  · Weakness, dizziness, or fainting  · Unusual drowsiness or confusion  · Reduced urine output or extreme thirst    FEVER:  Call your healthcare provider right away if any of these occur:  · Your child is 3 months old or younger and has a fever of 100.4°F (38°C) or higher. Get medical care right away. Fever in a young baby can be a sign of a dangerous infection.  · Your child is of any age and has repeated fevers above 104°F (40°C).  · Your child is younger than 2 years of age and a fever of 100.4°F (38°C) continues for more than 1 day.  · A fever of 100.4°F (38°C) for more than 3 days in anyone older than 2 years of age.       Why didn't I get a steroid today?    The benefits are small and, unlike topical glucocorticoids, systemic glucocorticoids possess a significant side effect profile. Major side effects include:    · Effects immunity predisposing you to getting a more severe infection and increases your white blood cell count. You have the flu, you do not need anything to weaken your immune system right now.  · Young children -- Growth impairment   · Skin consequences: Skin thinning and ecchymoses, Cushingoid appearance (rounded face), acne, weight gain, mild hirsutism, facial erythema, and striae.  · Eye consequences:  "Cataracts, increased intraocular pressure, exophthalmos.   · Cardiovascular consequences: Fluid retention, premature atherosclerotic disease, and arrhythmias.   · GI consequences: Increased risk for adverse gastrointestinal effects, such as gastritis, ulcer formation, and gastrointestinal bleeding  · Bone and muscle consequences: Osteoporosis, osteonecrosis, and myopathy.  · Neuropsychiatric effects: Mood disorders, psychosis, memory impairment, and   · Metabolic and Endocrine consequences: Suppress the hypothalamic-pituitary-adrenal (HPA) axis and increase blood sugar.     Can I just have a shot instead of pills?  No. If you are sick, you need a course of treatment (not just a one time dose).     Why didn't I get an antibiotic today?  You have been diagnosed with a virus an antibiotic will not help you. Antibiotics work by destroying the cell walls of bacteria, viruses do not have cell walls.     When to seek medical advice:    DEHYDRATION:  · Continued vomiting  · Frequent diarrhea (more than 5 times a day); blood (red or black color) or mucus in diarrhea  · Blood in vomit or stool  · Swollen abdomen or increasing abdominal pain  · Reduced urine output or extreme thirst  · Repeated vomiting after the first 4 hours on fluids  · Occasional vomiting for more than 48 hours  · Frequent diarrhea (more than 5 times a day), blood in diarrhea (red or black color), or mucus in diarrhea  · Blood in vomit or stool  · Swollen abdomen or signs of abdominal pain  · No urine for 8 hours, no tears when crying, "sunken" eyes, or dry mouth  · Unusual behavior changes, fussiness, drowsiness, confusion, or seizure  · Fever (see Fever and children, below)  FEVER:  Call your healthcare provider right away if any of these occur:  · Your child is 3 months old or younger and has a fever of 100.4°F (38°C) or higher. Get medical care right away. Fever in a young baby can be a sign of a dangerous infection.  · Your child is of any age and has " repeated fevers above 104°F (40°C).  · Your child is younger than 2 years of age and a fever of 100.4°F (38°C) continues for more than 1 day.  · A fever of 100.4°F (38°C) for more than 3 days in anyone older than 2 years of age.  Call 911  Call 911 or your local emergency services number if the child shows any of these symptoms or signs:  · Trouble breathing  · Confusion  · Is very drowsy or difficult to awaken  · Fainting or loss of consciousness  · Rapid heart rate  · Seizure  · Stiff neck     Your provider discussed your plan of care with you during your physical exam. It was reviewed once more by the provider when giving you an after visit summary. If the patient is a minor, the discharge instructions were discussed with an adult and that adult acknowledge their understanding of the provider's teaching.

## 2020-02-02 ENCOUNTER — PATIENT MESSAGE (OUTPATIENT)
Dept: FAMILY MEDICINE | Facility: CLINIC | Age: 70
End: 2020-02-02

## 2020-02-03 ENCOUNTER — TELEPHONE (OUTPATIENT)
Dept: URGENT CARE | Facility: CLINIC | Age: 70
End: 2020-02-03

## 2020-02-03 ENCOUNTER — OFFICE VISIT (OUTPATIENT)
Dept: FAMILY MEDICINE | Facility: CLINIC | Age: 70
End: 2020-02-03
Payer: MEDICARE

## 2020-02-03 VITALS
TEMPERATURE: 99 F | HEART RATE: 80 BPM | HEIGHT: 65 IN | DIASTOLIC BLOOD PRESSURE: 70 MMHG | WEIGHT: 140 LBS | RESPIRATION RATE: 18 BRPM | OXYGEN SATURATION: 97 % | SYSTOLIC BLOOD PRESSURE: 118 MMHG | BODY MASS INDEX: 23.32 KG/M2

## 2020-02-03 DIAGNOSIS — J01.10 ACUTE NON-RECURRENT FRONTAL SINUSITIS: Primary | ICD-10-CM

## 2020-02-03 DIAGNOSIS — R11.2 NON-INTRACTABLE VOMITING WITH NAUSEA, UNSPECIFIED VOMITING TYPE: ICD-10-CM

## 2020-02-03 PROCEDURE — 99213 OFFICE O/P EST LOW 20 MIN: CPT | Mod: S$GLB,,, | Performed by: INTERNAL MEDICINE

## 2020-02-03 PROCEDURE — 1126F PR PAIN SEVERITY QUANTIFIED, NO PAIN PRESENT: ICD-10-PCS | Mod: S$GLB,,, | Performed by: INTERNAL MEDICINE

## 2020-02-03 PROCEDURE — 99213 PR OFFICE/OUTPT VISIT, EST, LEVL III, 20-29 MIN: ICD-10-PCS | Mod: S$GLB,,, | Performed by: INTERNAL MEDICINE

## 2020-02-03 PROCEDURE — 1101F PR PT FALLS ASSESS DOC 0-1 FALLS W/OUT INJ PAST YR: ICD-10-PCS | Mod: CPTII,S$GLB,, | Performed by: INTERNAL MEDICINE

## 2020-02-03 PROCEDURE — 1159F MED LIST DOCD IN RCRD: CPT | Mod: S$GLB,,, | Performed by: INTERNAL MEDICINE

## 2020-02-03 PROCEDURE — 99999 PR PBB SHADOW E&M-EST. PATIENT-LVL V: ICD-10-PCS | Mod: PBBFAC,,, | Performed by: INTERNAL MEDICINE

## 2020-02-03 PROCEDURE — 1159F PR MEDICATION LIST DOCUMENTED IN MEDICAL RECORD: ICD-10-PCS | Mod: S$GLB,,, | Performed by: INTERNAL MEDICINE

## 2020-02-03 PROCEDURE — 99999 PR PBB SHADOW E&M-EST. PATIENT-LVL V: CPT | Mod: PBBFAC,,, | Performed by: INTERNAL MEDICINE

## 2020-02-03 PROCEDURE — 1101F PT FALLS ASSESS-DOCD LE1/YR: CPT | Mod: CPTII,S$GLB,, | Performed by: INTERNAL MEDICINE

## 2020-02-03 PROCEDURE — 1126F AMNT PAIN NOTED NONE PRSNT: CPT | Mod: S$GLB,,, | Performed by: INTERNAL MEDICINE

## 2020-02-03 RX ORDER — AMOXICILLIN 500 MG/1
500 TABLET, FILM COATED ORAL EVERY 12 HOURS
Qty: 14 TABLET | Refills: 0 | Status: SHIPPED | OUTPATIENT
Start: 2020-02-03 | End: 2020-02-10

## 2020-02-03 RX ORDER — POLYETHYLENE GLYCOL 3350 17 G/17G
POWDER, FOR SOLUTION ORAL
COMMUNITY
End: 2021-04-27

## 2020-02-03 RX ORDER — ONDANSETRON 4 MG/1
4 TABLET, FILM COATED ORAL EVERY 8 HOURS PRN
Qty: 30 TABLET | Refills: 0 | Status: SHIPPED | OUTPATIENT
Start: 2020-02-03 | End: 2020-06-30

## 2020-02-03 NOTE — PROGRESS NOTES
[facility] Note    Chief Complaint      Chief Complaint   Patient presents with    Cough     CONGESTION        History of Present Illness      ANA ROSA Green is a 69 y.o. female who presents today for   Chief Complaint   Patient presents with    Cough     CONGESTION    .  Patient comes to appointment here for acute visit rel;ated to above . Had visit to urgent care 20 was tested for flu and strep both negative ,. Was treated with mobic, tessalon perles . She has now developed sinus pressure pain , purulent drainage and fever .     Problem List Items Addressed This Visit        ENT    Acute non-recurrent frontal sinusitis - Primary    Overview     Amoxil 500 mg po bid disp 14   zofran 4mg po q8 prn for nausea             GI    Non-intractable vomiting with nausea    Overview     zofran prn                  Past Medical History:  Past Medical History:   Diagnosis Date    Anxiety     Celiac disease 2018    Celiac disease     Depression     Family history of breast cancer in mother      at age 68    H/O mammogram 2018    Birads 2/B  (Dr Deleon, Orders MMG yearly)     Hx of bone density study 2014    Normal     Hyperlipidemia     Hypertension     Menopause     Peptic ulcer     Reflux esophagitis     Urinary tract infection     Vaginal infection     Vaginal Pap smear 2014    Pap/Hpv Negative        Past Surgical History:  Past Surgical History:   Procedure Laterality Date    APPENDECTOMY      CARPAL TUNNEL RELEASE Bilateral 2017    ,     COLONOSCOPY  2018    Normal  (Cornell Velazco)     DILATION AND CURETTAGE OF UTERUS  1986    DUPUYTREN CONTRACTURE RELEASE Bilateral 2017    HYSTERECTOMY  1987    BEAU w/ nilo, no BSO     SHOULDER SURGERY   &     right rotator cuff    TONSILLECTOMY      UPPER GASTROINTESTINAL ENDOSCOPY  2018       Family History:  family history includes Breast cancer in her mother and paternal  aunt; Cancer in her mother and paternal aunt; Hyperlipidemia in her sister.    Social History:  Social History     Socioeconomic History    Marital status:      Spouse name: Not on file    Number of children: Not on file    Years of education: Not on file    Highest education level: Not on file   Occupational History    Not on file   Social Needs    Financial resource strain: Not on file    Food insecurity:     Worry: Not on file     Inability: Not on file    Transportation needs:     Medical: Not on file     Non-medical: Not on file   Tobacco Use    Smoking status: Never Smoker    Smokeless tobacco: Never Used   Substance and Sexual Activity    Alcohol use: No    Drug use: No    Sexual activity: Not Currently     Birth control/protection: See Surgical Hx, Surgical     Comment: :    Lifestyle    Physical activity:     Days per week: Not on file     Minutes per session: Not on file    Stress: Not on file   Relationships    Social connections:     Talks on phone: Not on file     Gets together: Not on file     Attends [employer] service: Not on file     Active member of club or organization: Not on file     Attends meetings of clubs or organizations: Not on file     Relationship status: Not on file   Other Topics Concern    Not on file   Social History Narrative    Not on file       Review of Systems:   Review of Systems   Constitutional: Positive for fever.   HENT: Positive for congestion and sinus pain.    Respiratory: Positive for cough. Negative for sputum production.    Cardiovascular: Negative for chest pain and leg swelling.   Gastrointestinal: Positive for nausea and vomiting. Negative for heartburn.   Musculoskeletal: Negative for myalgias.   Neurological: Positive for headaches.         Medications:  Outpatient Encounter Medications as of 2/3/2020   Medication Sig Note Dispense Refill    ACCU-CHEK COMPACT PLUS TEST Strp    3    ACCU-CHEK SOFTCLIX LANCETS Misc    3    alpha  lipoic acid 600 mg Cap        atorvastatin (LIPITOR) 40 MG tablet Take 1 tablet (40 mg total) by mouth once daily.  90 tablet 3    azelastine (ASTELIN) 137 mcg (0.1 %) nasal spray 1 spray (137 mcg total) by Nasal route 2 (two) times daily. for 14 days  30 mL 0    benzonatate (TESSALON) 100 MG capsule Take 1 capsule (100 mg total) by mouth 3 (three) times daily as needed for Cough.  20 capsule 0    blood-glucose meter kit Use as instructed.  Accu-chek Compact  1 each 0    calcium carbonate/vitamin D3 (CALTRATE WITH VITAMIN D3 ORAL) Take by mouth.       cinnamon bark (CINNAMON ORAL) Take by mouth.       coenzyme Q10 400 mg Cap Take by mouth.       flaxseed oil 1,000 mg Cap once a day 10/23/2017: Received from: [facility] and [facility] Subsidiaries and Affiliates      gabapentin (NEURONTIN) 400 MG capsule    3    gluc-condr-om3-dha-epa-fish-st (GLUCOSAMINE CHONDROITIN PLUS) 058-977-40-54 mg Cap once a day 10/23/2017: Received from: [facility] and Its Subsidiaries and Affiliates      glucosamine HCl 1,500 mg Tab Take 1,500 mg by mouth.       KRILL OIL ORAL Take by mouth.       meloxicam (MOBIC) 15 MG tablet Take 1 tablet (15 mg total) by mouth once daily. for 14 days  14 tablet 0    metFORMIN (GLUCOPHAGE) 500 MG tablet Take 1 tablet (500 mg total) by mouth 2 (two) times daily with meals.  180 tablet 3    MILK THISTLE ORAL Take by mouth.       pantoprazole (PROTONIX) 40 MG tablet  10/17/2019: Take as needed  11    polyethylene glycol (GLYCOLAX) 17 gram PwPk Take by mouth.       promethazine-dextromethorphan (PROMETHAZINE-DM) 6.25-15 mg/5 mL Syrp Take 5 mLs by mouth every 6 (six) hours as needed (May take every 4 hours, PRN. DoNOT take more than 30 mL in 24 hours.).  100 mL 0    temazepam (RESTORIL) 30 mg capsule Take 1 capsule (30 mg total) by mouth nightly as needed for Insomnia.  30 capsule 3    venlafaxine (EFFEXOR-XR) 75 MG 24 hr capsule Take 1 capsule (75 mg total) by mouth once  daily.  90 capsule 3    vit C,Q-Qh-miogm-lutein-zeaxan (PRESERVISION AREDS 2) 049-296-23-1 mg-unit-mg-mg Cap        vitamins  A,C,E-zinc-copper (PRESERVISION AREDS) 14,320-226-200 unit-mg-unit Cap Take 1 tablet by mouth once daily.       amoxicillin (AMOXIL) 500 MG Tab Take 1 tablet (500 mg total) by mouth every 12 (twelve) hours. for 7 days  14 tablet 0    ondansetron (ZOFRAN) 4 MG tablet Take 1 tablet (4 mg total) by mouth every 8 (eight) hours as needed for Nausea.  30 tablet 0     No facility-administered encounter medications on file as of 2/3/2020.         Allergies:  Review of patient's allergies indicates:   Allergen Reactions    Crestor [rosuvastatin] Swelling         Physical Exam      Vitals:    02/03/20 1142   BP: 118/70   Pulse: 80   Resp: 18   Temp: 98.7 °F (37.1 °C)      Body mass index is 23.3 kg/m².    Physical Exam   Constitutional: She is oriented to person, place, and time. She appears well-developed and well-nourished.   HENT:   Right Ear: Tympanic membrane normal.   Left Ear: Tympanic membrane normal.   Nose: Rhinorrhea and sinus tenderness present. Right sinus exhibits frontal sinus tenderness. Left sinus exhibits frontal sinus tenderness.   Mouth/Throat: Oropharynx is clear and moist.   Eyes: Pupils are equal, round, and reactive to light. EOM are normal.   Neck: Normal range of motion. No thyromegaly present.   Cardiovascular: Normal rate and normal heart sounds. Exam reveals no gallop and no friction rub.   No murmur heard.  Pulmonary/Chest: Breath sounds normal.   Abdominal: Soft. Bowel sounds are normal.   Musculoskeletal: Normal range of motion.   Lymphadenopathy:     She has no cervical adenopathy.   Neurological: She is alert and oriented to person, place, and time. No cranial nerve deficit.   Skin: Skin is warm. No rash noted.   Psychiatric: She has a normal mood and affect. Her behavior is normal.        Laboratory:  CBC:  No results for input(s): WBC, RBC, HGB, HCT, PLT, MCV,  MCH, MCHC in the last 2160 hours.  CMP:  No results for input(s): GLU, CALCIUM, ALBUMIN, PROT, NA, K, CO2, CL, BUN, ALKPHOS, ALT, AST, BILITOT in the last 2160 hours.    Invalid input(s): CREATININ  URINALYSIS:  Recent Labs   Lab Result Units 11/05/19  1318   Color, UA  yellow   Spec Grav UA  1.010   pH, UA  5.0   Nitrite, UA  -   Urobilinogen, UA  0.2      LIPIDS:  No results for input(s): TSH, HDL, CHOL, TRIG, LDLCALC, CHOLHDL, NONHDLCHOL, TOTALCHOLEST in the last 2160 hours.  TSH:  No results for input(s): TSH in the last 2160 hours.  A1C:  No results for input(s): HGBA1C in the last 2160 hours.    Radiology:        Assessment:     ANA ROSA Green is a 69 y.o.female with:    Acute non-recurrent frontal sinusitis  -     amoxicillin (AMOXIL) 500 MG Tab; Take 1 tablet (500 mg total) by mouth every 12 (twelve) hours. for 7 days  Dispense: 14 tablet; Refill: 0    Non-intractable vomiting with nausea, unspecified vomiting type  -     ondansetron (ZOFRAN) 4 MG tablet; Take 1 tablet (4 mg total) by mouth every 8 (eight) hours as needed for Nausea.  Dispense: 30 tablet; Refill: 0          Plan:     Problem List Items Addressed This Visit        ENT    Acute non-recurrent frontal sinusitis - Primary    Overview     Amoxil 500 mg po bid disp 14   zofran 4mg po q8 prn for nausea             GI    Non-intractable vomiting with nausea    Overview     zofran prn                As above, continue current medications and maintain follow up with specialists.  Return to clinic prn    Frederick W Dantagnan [facility] [place]

## 2020-02-10 ENCOUNTER — TELEPHONE (OUTPATIENT)
Dept: PRIMARY CARE CLINIC | Facility: CLINIC | Age: 70
End: 2020-02-10

## 2020-02-10 NOTE — TELEPHONE ENCOUNTER
Pt had last eye exam with [facility].  We have one scanned from 11/2018.  Can we call pt and find out if she has had one within 12 months and get report if so?

## 2020-02-11 ENCOUNTER — PATIENT OUTREACH (OUTPATIENT)
Dept: ADMINISTRATIVE | Facility: HOSPITAL | Age: 70
End: 2020-02-11

## 2020-02-12 ENCOUNTER — PATIENT OUTREACH (OUTPATIENT)
Dept: ADMINISTRATIVE | Facility: HOSPITAL | Age: 70
End: 2020-02-12

## 2020-02-14 ENCOUNTER — PATIENT MESSAGE (OUTPATIENT)
Dept: ADMINISTRATIVE | Facility: HOSPITAL | Age: 70
End: 2020-02-14

## 2020-02-17 ENCOUNTER — PATIENT OUTREACH (OUTPATIENT)
Dept: ADMINISTRATIVE | Facility: OTHER | Age: 70
End: 2020-02-17

## 2020-02-17 DIAGNOSIS — Z13.5 ENCOUNTER FOR SCREENING FOR DIABETIC RETINOPATHY: Primary | ICD-10-CM

## 2020-02-17 NOTE — PROGRESS NOTES
Chart reviewed.   Requested updates from Care Everywhere.  Immunizations reconciled.    updated.  Placed order for diabetic eye screening photo.

## 2020-02-18 ENCOUNTER — OFFICE VISIT (OUTPATIENT)
Dept: PODIATRY | Facility: CLINIC | Age: 70
End: 2020-02-18
Payer: MEDICARE

## 2020-02-18 VITALS — BODY MASS INDEX: 23.32 KG/M2 | HEIGHT: 65 IN | WEIGHT: 140 LBS

## 2020-02-18 DIAGNOSIS — B35.1 ONYCHOMYCOSIS DUE TO DERMATOPHYTE: ICD-10-CM

## 2020-02-18 DIAGNOSIS — R20.2 PARESTHESIA OF BOTH FEET: ICD-10-CM

## 2020-02-18 DIAGNOSIS — E11.42 DIABETIC POLYNEUROPATHY ASSOCIATED WITH TYPE 2 DIABETES MELLITUS: Primary | ICD-10-CM

## 2020-02-18 DIAGNOSIS — Q66.70 PES CAVUS: ICD-10-CM

## 2020-02-18 PROCEDURE — 11721 DEBRIDE NAIL 6 OR MORE: CPT | Mod: Q9,S$GLB,, | Performed by: PODIATRIST

## 2020-02-18 PROCEDURE — 1100F PR PT FALLS ASSESS DOC 2+ FALLS/FALL W/INJURY/YR: ICD-10-PCS | Mod: CPTII,S$GLB,, | Performed by: PODIATRIST

## 2020-02-18 PROCEDURE — 11721 PR DEBRIDEMENT OF NAILS, 6 OR MORE: ICD-10-PCS | Mod: Q9,S$GLB,, | Performed by: PODIATRIST

## 2020-02-18 PROCEDURE — 99999 PR PBB SHADOW E&M-EST. PATIENT-LVL II: ICD-10-PCS | Mod: PBBFAC,,, | Performed by: PODIATRIST

## 2020-02-18 PROCEDURE — 1126F PR PAIN SEVERITY QUANTIFIED, NO PAIN PRESENT: ICD-10-PCS | Mod: S$GLB,,, | Performed by: PODIATRIST

## 2020-02-18 PROCEDURE — 1159F PR MEDICATION LIST DOCUMENTED IN MEDICAL RECORD: ICD-10-PCS | Mod: S$GLB,,, | Performed by: PODIATRIST

## 2020-02-18 PROCEDURE — 99999 PR PBB SHADOW E&M-EST. PATIENT-LVL II: CPT | Mod: PBBFAC,,, | Performed by: PODIATRIST

## 2020-02-18 PROCEDURE — 1100F PTFALLS ASSESS-DOCD GE2>/YR: CPT | Mod: CPTII,S$GLB,, | Performed by: PODIATRIST

## 2020-02-18 PROCEDURE — 3288F FALL RISK ASSESSMENT DOCD: CPT | Mod: CPTII,S$GLB,, | Performed by: PODIATRIST

## 2020-02-18 PROCEDURE — 3288F PR FALLS RISK ASSESSMENT DOCUMENTED: ICD-10-PCS | Mod: CPTII,S$GLB,, | Performed by: PODIATRIST

## 2020-02-18 PROCEDURE — 99213 OFFICE O/P EST LOW 20 MIN: CPT | Mod: 25,S$GLB,, | Performed by: PODIATRIST

## 2020-02-18 PROCEDURE — 1126F AMNT PAIN NOTED NONE PRSNT: CPT | Mod: S$GLB,,, | Performed by: PODIATRIST

## 2020-02-18 PROCEDURE — 1159F MED LIST DOCD IN RCRD: CPT | Mod: S$GLB,,, | Performed by: PODIATRIST

## 2020-02-18 PROCEDURE — 3044F HG A1C LEVEL LT 7.0%: CPT | Mod: CPTII,S$GLB,, | Performed by: PODIATRIST

## 2020-02-18 PROCEDURE — 3044F PR MOST RECENT HEMOGLOBIN A1C LEVEL <7.0%: ICD-10-PCS | Mod: CPTII,S$GLB,, | Performed by: PODIATRIST

## 2020-02-18 PROCEDURE — 99213 PR OFFICE/OUTPT VISIT, EST, LEVL III, 20-29 MIN: ICD-10-PCS | Mod: 25,S$GLB,, | Performed by: PODIATRIST

## 2020-02-19 NOTE — PROGRESS NOTES
Subjective:      Patient ID: MARIA LUISA Green is a 69 y.o. female.    Chief Complaint: Diabetes Mellitus (6.8 10/23/19 Marily); Diabetic Foot Exam; and Peripheral Neuropathy    MARIA LUISA NEVAREZ is a 69 y.o. female who presents to the clinic for evaluation and treatment of diabetic feet. MARIA LUISA NEVAREZ has a past medical history of Anxiety, Celiac disease (2018), Celiac disease, Depression, Family history of breast cancer in mother, H/O mammogram (2018), bone density study (2014), Hyperlipidemia, Hypertension, Menopause, Peptic ulcer, Reflux esophagitis, Urinary tract infection, Vaginal infection, and Vaginal Pap smear (2014). Patient presents to clinic with questions regarding appropriate shoe gear for her foot type.  Notes pain in the feet with wearing existing closed toe shoe gear, however, notes being less symptomatic while in fitflops.  Symptoms are alleviated with rest.  Also, notes that the current dosage of gabapentin fails to eliminate her neuropathy pain.  Inquires as to additional treatment options.   Denies any additional pedal complaints.    PCP: Pavel Calixto MD    Date Last Seen by PCP: 10/19  Hemoglobin A1C   Date Value Ref Range Status   10/23/2019 6.8 % Final         Past Medical History:   Diagnosis Date    Anxiety     Celiac disease 2018    Celiac disease     Depression     Family history of breast cancer in mother      at age 68    H/O mammogram 2018    Birads 2/B  (Dr Deleon, Orders MMG yearly)     Hx of bone density study 2014    Normal     Hyperlipidemia     Hypertension     Menopause     Peptic ulcer     Reflux esophagitis     Urinary tract infection     Vaginal infection     Vaginal Pap smear 2014    Pap/Hpv Negative        Past Surgical History:   Procedure Laterality Date    APPENDECTOMY      CARPAL TUNNEL RELEASE Bilateral 2017    ,     COLONOSCOPY  2018    Normal  (Cornell Velazco)     DILATION AND CURETTAGE  OF UTERUS  1986    DUPUYTREN CONTRACTURE RELEASE Bilateral 09/2017    HYSTERECTOMY  1987    BEAU w/ appy, no BSO     SHOULDER SURGERY  2009 & 2010    right rotator cuff    TONSILLECTOMY      UPPER GASTROINTESTINAL ENDOSCOPY  04/2018       Family History   Problem Relation Age of Onset    Breast cancer Mother     Cancer Mother     Hyperlipidemia Sister     Breast cancer Paternal Aunt     Cancer Paternal Aunt     Kidney disease Neg Hx        Social History     Socioeconomic History    Marital status:      Spouse name: Not on file    Number of children: Not on file    Years of education: Not on file    Highest education level: Not on file   Occupational History    Not on file   Social Needs    Financial resource strain: Not on file    Food insecurity:     Worry: Not on file     Inability: Not on file    Transportation needs:     Medical: Not on file     Non-medical: Not on file   Tobacco Use    Smoking status: Never Smoker    Smokeless tobacco: Never Used   Substance and Sexual Activity    Alcohol use: No    Drug use: No    Sexual activity: Not Currently     Birth control/protection: See Surgical Hx, Surgical     Comment: :    Lifestyle    Physical activity:     Days per week: Not on file     Minutes per session: Not on file    Stress: Not on file   Relationships    Social connections:     Talks on phone: Not on file     Gets together: Not on file     Attends Holiness service: Not on file     Active member of club or organization: Not on file     Attends meetings of clubs or organizations: Not on file     Relationship status: Not on file   Other Topics Concern    Not on file   Social History Narrative    Not on file       Current Outpatient Medications   Medication Sig Dispense Refill    ACCU-CHEK COMPACT PLUS TEST Strp   3    ACCU-CHEK SOFTCLIX LANCETS Misc   3    alpha lipoic acid 600 mg Cap       atorvastatin (LIPITOR) 40 MG tablet Take 1 tablet (40 mg total) by mouth  once daily. 90 tablet 3    blood-glucose meter kit Use as instructed.  Accu-chek Compact 1 each 0    calcium carbonate/vitamin D3 (CALTRATE WITH VITAMIN D3 ORAL) Take by mouth.      cinnamon bark (CINNAMON ORAL) Take by mouth.      coenzyme Q10 400 mg Cap Take by mouth.      flaxseed oil 1,000 mg Cap once a day      gabapentin (NEURONTIN) 400 MG capsule   3    gluc-condr-om3-dha-epa-fish-st (GLUCOSAMINE CHONDROITIN PLUS) 117-408-61-54 mg Cap once a day      glucosamine HCl 1,500 mg Tab Take 1,500 mg by mouth.      KRILL OIL ORAL Take by mouth.      metFORMIN (GLUCOPHAGE) 500 MG tablet Take 1 tablet (500 mg total) by mouth 2 (two) times daily with meals. 180 tablet 3    MILK THISTLE ORAL Take by mouth.      ondansetron (ZOFRAN) 4 MG tablet Take 1 tablet (4 mg total) by mouth every 8 (eight) hours as needed for Nausea. 30 tablet 0    pantoprazole (PROTONIX) 40 MG tablet   11    polyethylene glycol (GLYCOLAX) 17 gram PwPk Take by mouth.      temazepam (RESTORIL) 30 mg capsule Take 1 capsule (30 mg total) by mouth nightly as needed for Insomnia. 30 capsule 3    venlafaxine (EFFEXOR-XR) 75 MG 24 hr capsule Take 1 capsule (75 mg total) by mouth once daily. 90 capsule 3    vit C,K-Kv-asvsq-lutein-zeaxan (PRESERVISION AREDS 2) 405-912-50-1 mg-unit-mg-mg Cap       vitamins  A,C,E-zinc-copper (PRESERVISION AREDS) 14,320-226-200 unit-mg-unit Cap Take 1 tablet by mouth once daily.      azelastine (ASTELIN) 137 mcg (0.1 %) nasal spray 1 spray (137 mcg total) by Nasal route 2 (two) times daily. for 14 days 30 mL 0     No current facility-administered medications for this visit.        Review of patient's allergies indicates:   Allergen Reactions    Crestor [rosuvastatin] Swelling         Review of Systems   Constitution: Negative for chills and fever.   Cardiovascular: Negative for claudication and leg swelling.   Skin: Positive for color change and nail changes.   Musculoskeletal: Positive for joint pain.  Negative for joint swelling, muscle cramps and muscle weakness.   Neurological: Positive for numbness and paresthesias.   Psychiatric/Behavioral: Negative for altered mental status.           Objective:      Physical Exam   Constitutional: She is oriented to person, place, and time. She appears well-developed and well-nourished. No distress.   Cardiovascular:   Pulses:       Dorsalis pedis pulses are 2+ on the right side, and 2+ on the left side.        Posterior tibial pulses are 1+ on the right side, and 1+ on the left side.   CFT is < 3 seconds bilateral.  Pedal hair growth is decreased bilateral.  Mild non pitting lower extremity edema noted bilateral.  Toes are warm to touch bilateral.   Musculoskeletal: She exhibits no edema or tenderness.   Muscle strength 5/5 in all muscle groups bilateral.  No tenderness nor crepitation with ROM of foot/ankle joints bilateral.  No tenderness with palpation of bilateral foot and ankle.  Bilateral pes cavus foot type.   Neurological: She is alert and oriented to person, place, and time. She has normal strength. A sensory deficit is present.   Protective sensation per [place] monofilament is decreased bilateral.  Vibratory sensation is decreased bilateral.  Light touch is intact bilateral.   Skin: Skin is warm, dry and intact. Capillary refill takes less than 2 seconds. No abrasion, no bruising, no burn, no ecchymosis, no lesion, no petechiae and no rash noted. She is not diaphoretic. No erythema. No pallor.   Pedal skin appears thin bilateral.  Toenails x 10 appear thickened by 2 mm's, elongated by 2 mm's, and discolored with subungual debris. Examination of the skin reveals no evidence of significant maceration, rashes, open lesions, suspicious appearing nevi or other concerning lesions.              Assessment:       Encounter Diagnoses   Name Primary?    Diabetic polyneuropathy associated with type 2 diabetes mellitus Yes    Paresthesia of both feet     Pes  cavus     Onychomycosis due to dermatophyte          Plan:       MARIA LUISA NEVAREZ was seen today for diabetes mellitus, diabetic foot exam and peripheral neuropathy.    Diagnoses and all orders for this visit:    Diabetic polyneuropathy associated with type 2 diabetes mellitus    Paresthesia of both feet    Pes cavus    Onychomycosis due to dermatophyte      I counseled the patient on her conditions, their implications and medical management.    Discussed appropriate shoe gear to accommodate for pes cavus foot type.  Recommend SAS shoes, as they impart added stability to the feet.    Patient to increase frequency of current dosage of gabapentin to TID.    Shoe inspection. Diabetic Foot Education. Patient reminded of the importance of good nutrition and blood sugar control to help prevent podiatric complications of diabetes. Patient instructed on proper foot hygeine. We discussed wearing proper shoe gear, daily foot inspections, never walking without protective shoe gear, never putting sharp instruments to feet    With patient's permission, nails were aggressively reduced and debrided x 10 to their soft tissue attachment mechanically and with electric , removing all offending nail and debris. Patient relates relief following the procedure. She will continue to monitor the areas daily, inspect her feet, wear protective shoe gear when ambulatory, moisturizer to maintain skin integrity and follow in this office in approximately 2-3 months, sooner p.r.n.    Follow up in about 3 months (around 5/18/2020).    Lucas Bryan DPM

## 2020-03-17 DIAGNOSIS — F51.01 PRIMARY INSOMNIA: ICD-10-CM

## 2020-03-17 RX ORDER — TEMAZEPAM 30 MG/1
30 CAPSULE ORAL NIGHTLY PRN
Qty: 30 CAPSULE | Refills: 3 | Status: SHIPPED | OUTPATIENT
Start: 2020-03-17 | End: 2020-07-27 | Stop reason: SDUPTHER

## 2020-03-26 ENCOUNTER — TELEPHONE (OUTPATIENT)
Dept: FAMILY MEDICINE | Facility: CLINIC | Age: 70
End: 2020-03-26

## 2020-03-26 ENCOUNTER — PATIENT MESSAGE (OUTPATIENT)
Dept: FAMILY MEDICINE | Facility: CLINIC | Age: 70
End: 2020-03-26

## 2020-03-26 ENCOUNTER — OFFICE VISIT (OUTPATIENT)
Dept: FAMILY MEDICINE | Facility: CLINIC | Age: 70
End: 2020-03-26
Payer: MEDICARE

## 2020-03-26 DIAGNOSIS — R05.9 COUGH: ICD-10-CM

## 2020-03-26 DIAGNOSIS — R50.9 FEVER, UNSPECIFIED FEVER CAUSE: Primary | ICD-10-CM

## 2020-03-26 PROCEDURE — 3288F PR FALLS RISK ASSESSMENT DOCUMENTED: ICD-10-PCS | Mod: CPTII,95,, | Performed by: INTERNAL MEDICINE

## 2020-03-26 PROCEDURE — 1159F PR MEDICATION LIST DOCUMENTED IN MEDICAL RECORD: ICD-10-PCS | Mod: 95,,, | Performed by: INTERNAL MEDICINE

## 2020-03-26 PROCEDURE — 1100F PR PT FALLS ASSESS DOC 2+ FALLS/FALL W/INJURY/YR: ICD-10-PCS | Mod: CPTII,95,, | Performed by: INTERNAL MEDICINE

## 2020-03-26 PROCEDURE — 99214 OFFICE O/P EST MOD 30 MIN: CPT | Mod: 95,,, | Performed by: INTERNAL MEDICINE

## 2020-03-26 PROCEDURE — 3288F FALL RISK ASSESSMENT DOCD: CPT | Mod: CPTII,95,, | Performed by: INTERNAL MEDICINE

## 2020-03-26 PROCEDURE — 99214 PR OFFICE/OUTPT VISIT, EST, LEVL IV, 30-39 MIN: ICD-10-PCS | Mod: 95,,, | Performed by: INTERNAL MEDICINE

## 2020-03-26 PROCEDURE — 1159F MED LIST DOCD IN RCRD: CPT | Mod: 95,,, | Performed by: INTERNAL MEDICINE

## 2020-03-26 PROCEDURE — 1100F PTFALLS ASSESS-DOCD GE2>/YR: CPT | Mod: CPTII,95,, | Performed by: INTERNAL MEDICINE

## 2020-03-26 NOTE — PROGRESS NOTES
The patient location is: home  The chief complaint leading to consultation is: cough  Visit type: Virtual visit with synchronous audio and video  Total time spent with patient: 15 minutes  Each patient to whom he or she provides medical services by telemedicine is:  (1) informed of the relationship between the physician and patient and the respective role of any other health care provider with respect to management of the patient; and (2) notified that he or she may decline to receive medical services by telemedicine and may withdraw from such care at any time.    [facility] Note    Chief Complaint   Cough     History of Present Illness      MARIA LUISA Green is a 69 y.o. female who presents today for cough.  Patient comes to appointment via virtual visit.     Has been self quarantining since Monday.  Prior to that was [place] 2[place]  Was having stuffy nose, itchy eyes.  Started taking Mucinex and Aleve D on Monday.  Started with fever up to 102 two days ago.  Temp was 99.6.  No headache/bodyaches/SOB.  Occasional dry cough, no chest congestion.  No sick contacts.    Problem List Items Addressed This Visit     None      Visit Diagnoses     Fever, unspecified fever cause    -  Primary    Cough              Health Maintenance   Topic Date Due    Hepatitis C Screening  1950    TETANUS VACCINE  1968    DEXA SCAN  2017    Pneumococcal Vaccine (65+ Low/Medium Risk) (2 of 2 - PPSV23) 2019    Mammogram  2020    Lipid Panel  10/23/2024    Colonoscopy  2028       Past Medical History:   Diagnosis Date    Anxiety     Celiac disease 2018    Celiac disease     Depression     Family history of breast cancer in mother      at age 68    H/O mammogram 2018    Birads 2/B  (Dr Deleon, Orders MMG yearly)     Hx of bone density study 2014    Normal     Hyperlipidemia     Hypertension      Menopause     Peptic ulcer     Reflux esophagitis     Urinary tract infection     Vaginal infection     Vaginal Pap smear 04/07/2014    Pap/Hpv Negative        Past Surgical History:   Procedure Laterality Date    APPENDECTOMY      CARPAL TUNNEL RELEASE Bilateral 09/2017    ,     COLONOSCOPY  04/2018    Normal  (Cornell Velazco)     DILATION AND CURETTAGE OF UTERUS  1986    DUPUYTREN CONTRACTURE RELEASE Bilateral 09/2017    HYSTERECTOMY  1987    BEAU w/ appy, no BSO     SHOULDER SURGERY  2009 & 2010    right rotator cuff    TONSILLECTOMY      UPPER GASTROINTESTINAL ENDOSCOPY  04/2018       family history includes Breast cancer in her mother and paternal aunt; Cancer in her mother and paternal aunt; Hyperlipidemia in her sister.    Social History     Tobacco Use    Smoking status: Never Smoker    Smokeless tobacco: Never Used   Substance Use Topics    Alcohol use: No     Frequency: Never    Drug use: No       Review of Systems   Constitutional: Positive for fever.   HENT: Positive for congestion. Negative for ear pain and sore throat.    Respiratory: Positive for cough. Negative for wheezing.    Cardiovascular: Negative for chest pain.   Gastrointestinal: Negative for abdominal pain, diarrhea, nausea and vomiting.   Genitourinary: Negative for dysuria.   Skin: Negative for rash.   Neurological: Positive for headaches.        Outpatient Encounter Medications as of 3/26/2020   Medication Sig Note Dispense Refill    ACCU-CHEK COMPACT PLUS TEST Strp    3    ACCU-CHEK SOFTCLIX LANCETS Misc    3    alpha lipoic acid 600 mg Cap        atorvastatin (LIPITOR) 40 MG tablet Take 1 tablet (40 mg total) by mouth once daily.  90 tablet 3    azelastine (ASTELIN) 137 mcg (0.1 %) nasal spray 1 spray (137 mcg total) by Nasal route 2 (two) times daily. for 14 days  30 mL 0    blood-glucose meter kit Use as instructed.  Accu-chek Compact  1 each 0    calcium carbonate/vitamin D3 (CALTRATE WITH VITAMIN D3 ORAL)  Take by mouth.       cinnamon bark (CINNAMON ORAL) Take by mouth.       coenzyme Q10 400 mg Cap Take by mouth.       flaxseed oil 1,000 mg Cap once a day 10/23/2017: Received from: [facility]      gabapentin (NEURONTIN) 400 MG capsule    3    gluc-condr-om3-dha-epa-fish-st (GLUCOSAMINE CHONDROITIN PLUS) 853-244-01-54 mg Cap once a day 10/23/2017: Received from: [facility]      glucosamine HCl 1,500 mg Tab Take 1,500 mg by mouth.       KRILL OIL ORAL Take by mouth.       metFORMIN (GLUCOPHAGE) 500 MG tablet Take 1 tablet (500 mg total) by mouth 2 (two) times daily with meals.  180 tablet 3    MILK THISTLE ORAL Take by mouth.       ondansetron (ZOFRAN) 4 MG tablet Take 1 tablet (4 mg total) by mouth every 8 (eight) hours as needed for Nausea.  30 tablet 0    pantoprazole (PROTONIX) 40 MG tablet  10/17/2019: Take as needed  11    polyethylene glycol (GLYCOLAX) 17 gram PwPk Take by mouth.       temazepam (RESTORIL) 30 mg capsule TAKE 1 CAPSULE (30 MG TOTAL) BY MOUTH NIGHTLY AS NEEDED FOR INSOMNIA.  30 capsule 3    venlafaxine (EFFEXOR-XR) 75 MG 24 hr capsule Take 1 capsule (75 mg total) by mouth once daily.  90 capsule 3    vit C,B-Ax-vmflz-lutein-zeaxan (PRESERVISION AREDS 2) 217-782-61-1 mg-unit-mg-mg Cap        vitamins  A,C,E-zinc-copper (PRESERVISION AREDS) 14,320-226-200 unit-mg-unit Cap Take 1 tablet by mouth once daily.        No facility-administered encounter medications on file as of 3/26/2020.         Review of patient's allergies indicates:   Allergen Reactions    Crestor [rosuvastatin] Swelling       Physical Exam       ] No VS, virtual visit    Physical Exam   Constitutional: She is oriented to person, place, and time. She appears well-developed and well-nourished. No distress.   HENT:   Head: Normocephalic and atraumatic.   Pulmonary/Chest: Effort normal. No respiratory distress.    Musculoskeletal: Normal range of motion. She exhibits no deformity.   Neurological: She is alert and oriented to person, place, and time. She exhibits normal muscle tone. Coordination normal.   Skin: Skin is warm. No rash noted. She is not diaphoretic. No erythema. No pallor.   Psychiatric: She has a normal mood and affect. Her behavior is normal. Judgment and thought content normal.        Laboratory:  CBC:  No results for input(s): WBC, RBC, HGB, HCT, PLT, MCV, MCH, MCHC in the last 2160 hours.  CMP:  No results for input(s): GLU, CALCIUM, ALBUMIN, PROT, NA, K, CO2, CL, BUN, ALKPHOS, ALT, AST, BILITOT in the last 2160 hours.    Invalid input(s): CREATININ  URINALYSIS:  No results for input(s): COLORU, CLARITYU, SPECGRAV, PHUR, PROTEINUA, GLUCOSEU, BILIRUBINCON, BLOODU, WBCU, RBCU, BACTERIA, MUCUS, NITRITE, LEUKOCYTESUR, UROBILINOGEN, HYALINECASTS in the last 2160 hours.   LIPIDS:  No results for input(s): TSH, HDL, CHOL, TRIG, LDLCALC, CHOLHDL, NONHDLCHOL, TOTALCHOLEST in the last 2160 hours.  TSH:  No results for input(s): TSH in the last 2160 hours.  A1C:  No results for input(s): HGBA1C in the last 2160 hours.    Radiology:  No new imaging    Assessment/Plan     MARIA LUISA Green is a 69 y.o.female with:    1. Fever, unspecified fever cause    2. Cough    -Likely COVID-19 but does not meet criteria as she is not high risk and she is clinically stable.  Will continue to monitor her temps, can take Tylenol as needed for fever, Delsym OTC for cough.  Will let us know if any worsening/concerning symptoms like SOB occur.  She will continue to self-isolate until symptom free for 7 days and fever free for 3 days.  Patient voiced understanding.  -Continue current medications and maintain follow up with specialists.  Return to clinic in [place] with Dr. Calixto.      Anne Maddox MD  [facility] [place] [facility]        Answers for HPI/ROS submitted by the patient on 3/26/2020   Fever  Chronicity: new  Onset:  in the past 7 days  Frequency: 2 to 4 times per day  Progression since onset: gradually improving  Max temp prior to arrival: 102 to 102.9 F  Temperature source: an oral thermometer  muscle aches: No  sleepiness: Yes  Treatments tried: fluids  Improvement on treatment: mild

## 2020-03-26 NOTE — TELEPHONE ENCOUNTER
----- Message from Kenzie Radford sent at 3/26/2020 11:08 AM CDT -----  Contact: Patient   Patient called requesting a call back in reference to getting tested for Coronavirus or medical advice. Pt spoke with [facility] hotline and was informed documentation from PCP is needed to obtain testing for Coronavirus at one of [facility] testing locations. Patient stated present symptoms are fever of 102, cough & fatigue If possible, can you please assist patient? Pt can be reached at [phone]

## 2020-03-26 NOTE — TELEPHONE ENCOUNTER
Pt hasn't came in contact with anyone that she know with the virus. [place] Last temp check was 99. Pt is open to do a virtual visit today or tomorrow.

## 2020-03-28 ENCOUNTER — PATIENT MESSAGE (OUTPATIENT)
Dept: FAMILY MEDICINE | Facility: CLINIC | Age: 70
End: 2020-03-28

## 2020-03-30 RX ORDER — AZITHROMYCIN 250 MG/1
TABLET, FILM COATED ORAL
Qty: 6 TABLET | Refills: 0 | Status: SHIPPED | OUTPATIENT
Start: 2020-03-30 | End: 2020-04-04

## 2020-04-08 ENCOUNTER — PATIENT MESSAGE (OUTPATIENT)
Dept: FAMILY MEDICINE | Facility: CLINIC | Age: 70
End: 2020-04-08

## 2020-04-08 DIAGNOSIS — E11.42 TYPE 2 DIABETES MELLITUS WITH DIABETIC POLYNEUROPATHY, WITHOUT LONG-TERM CURRENT USE OF INSULIN: Primary | ICD-10-CM

## 2020-04-15 DIAGNOSIS — R31.0 GROSS HEMATURIA: Primary | ICD-10-CM

## 2020-04-15 DIAGNOSIS — N39.3 SUI (STRESS URINARY INCONTINENCE, FEMALE): ICD-10-CM

## 2020-04-15 DIAGNOSIS — R35.0 URINARY FREQUENCY: ICD-10-CM

## 2020-04-22 ENCOUNTER — TELEPHONE (OUTPATIENT)
Dept: FAMILY MEDICINE | Facility: CLINIC | Age: 70
End: 2020-04-22

## 2020-04-22 ENCOUNTER — OFFICE VISIT (OUTPATIENT)
Dept: FAMILY MEDICINE | Facility: CLINIC | Age: 70
End: 2020-04-22
Payer: MEDICARE

## 2020-04-22 DIAGNOSIS — F33.0 MILD RECURRENT MAJOR DEPRESSION: ICD-10-CM

## 2020-04-22 DIAGNOSIS — M50.30 DDD (DEGENERATIVE DISC DISEASE), CERVICAL: ICD-10-CM

## 2020-04-22 DIAGNOSIS — E78.2 HYPERLIPIDEMIA, MIXED: ICD-10-CM

## 2020-04-22 DIAGNOSIS — G89.29 CHRONIC NECK PAIN: ICD-10-CM

## 2020-04-22 DIAGNOSIS — E11.42 TYPE 2 DIABETES MELLITUS WITH DIABETIC POLYNEUROPATHY, WITHOUT LONG-TERM CURRENT USE OF INSULIN: Primary | ICD-10-CM

## 2020-04-22 DIAGNOSIS — K90.0 CELIAC DISEASE: ICD-10-CM

## 2020-04-22 DIAGNOSIS — M54.2 CHRONIC NECK PAIN: ICD-10-CM

## 2020-04-22 DIAGNOSIS — K21.9 GERD WITHOUT ESOPHAGITIS: ICD-10-CM

## 2020-04-22 DIAGNOSIS — F51.01 PRIMARY INSOMNIA: ICD-10-CM

## 2020-04-22 PROBLEM — J01.10 ACUTE NON-RECURRENT FRONTAL SINUSITIS: Status: RESOLVED | Noted: 2020-02-03 | Resolved: 2020-04-22

## 2020-04-22 PROBLEM — R11.2 NON-INTRACTABLE VOMITING WITH NAUSEA: Status: RESOLVED | Noted: 2020-02-03 | Resolved: 2020-04-22

## 2020-04-22 PROCEDURE — 3288F FALL RISK ASSESSMENT DOCD: CPT | Mod: CPTII,95,, | Performed by: INTERNAL MEDICINE

## 2020-04-22 PROCEDURE — 1100F PTFALLS ASSESS-DOCD GE2>/YR: CPT | Mod: CPTII,95,, | Performed by: INTERNAL MEDICINE

## 2020-04-22 PROCEDURE — 1159F PR MEDICATION LIST DOCUMENTED IN MEDICAL RECORD: ICD-10-PCS | Mod: 95,,, | Performed by: INTERNAL MEDICINE

## 2020-04-22 PROCEDURE — 99214 OFFICE O/P EST MOD 30 MIN: CPT | Mod: 95,,, | Performed by: INTERNAL MEDICINE

## 2020-04-22 PROCEDURE — 3288F PR FALLS RISK ASSESSMENT DOCUMENTED: ICD-10-PCS | Mod: CPTII,95,, | Performed by: INTERNAL MEDICINE

## 2020-04-22 PROCEDURE — 3044F PR MOST RECENT HEMOGLOBIN A1C LEVEL <7.0%: ICD-10-PCS | Mod: CPTII,95,, | Performed by: INTERNAL MEDICINE

## 2020-04-22 PROCEDURE — 1159F MED LIST DOCD IN RCRD: CPT | Mod: 95,,, | Performed by: INTERNAL MEDICINE

## 2020-04-22 PROCEDURE — 3044F HG A1C LEVEL LT 7.0%: CPT | Mod: CPTII,95,, | Performed by: INTERNAL MEDICINE

## 2020-04-22 PROCEDURE — 99214 PR OFFICE/OUTPT VISIT, EST, LEVL IV, 30-39 MIN: ICD-10-PCS | Mod: 95,,, | Performed by: INTERNAL MEDICINE

## 2020-04-22 PROCEDURE — 1100F PR PT FALLS ASSESS DOC 2+ FALLS/FALL W/INJURY/YR: ICD-10-PCS | Mod: CPTII,95,, | Performed by: INTERNAL MEDICINE

## 2020-04-22 RX ORDER — POLYETHYLENE GLYCOL 3350 17 G/17G
POWDER, FOR SOLUTION ORAL
COMMUNITY
End: 2020-04-22

## 2020-04-22 NOTE — TELEPHONE ENCOUNTER
----- Message from Pavel Calixto MD sent at 4/22/2020 11:44 AM CDT -----  Schedule labs at [place] 1 week prior to next visit.  Also please schedule at the same as her

## 2020-05-05 ENCOUNTER — PATIENT MESSAGE (OUTPATIENT)
Dept: FAMILY MEDICINE | Facility: CLINIC | Age: 70
End: 2020-05-05

## 2020-05-05 RX ORDER — LANCETS
1 EACH MISCELLANEOUS 2 TIMES DAILY
Qty: 200 EACH | Refills: 3 | Status: SHIPPED | OUTPATIENT
Start: 2020-05-05 | End: 2022-02-04

## 2020-06-28 ENCOUNTER — PATIENT OUTREACH (OUTPATIENT)
Dept: ADMINISTRATIVE | Facility: OTHER | Age: 70
End: 2020-06-28

## 2020-06-28 NOTE — PROGRESS NOTES
Requested updates within Care Everywhere.  Patient's chart was reviewed for overdue ELLEN topics.  Immunizations reconciled.

## 2020-06-30 ENCOUNTER — OFFICE VISIT (OUTPATIENT)
Dept: PODIATRY | Facility: CLINIC | Age: 70
End: 2020-06-30
Payer: MEDICARE

## 2020-06-30 VITALS — HEIGHT: 65 IN | WEIGHT: 136.88 LBS | BODY MASS INDEX: 22.81 KG/M2

## 2020-06-30 DIAGNOSIS — E11.42 DIABETIC POLYNEUROPATHY ASSOCIATED WITH TYPE 2 DIABETES MELLITUS: Primary | ICD-10-CM

## 2020-06-30 DIAGNOSIS — R20.2 PARESTHESIA OF BOTH FEET: ICD-10-CM

## 2020-06-30 DIAGNOSIS — B35.1 ONYCHOMYCOSIS DUE TO DERMATOPHYTE: ICD-10-CM

## 2020-06-30 PROCEDURE — 11721 PR DEBRIDEMENT OF NAILS, 6 OR MORE: ICD-10-PCS | Mod: Q9,S$GLB,, | Performed by: PODIATRIST

## 2020-06-30 PROCEDURE — 99999 PR PBB SHADOW E&M-EST. PATIENT-LVL IV: CPT | Mod: PBBFAC,,, | Performed by: PODIATRIST

## 2020-06-30 PROCEDURE — 11721 DEBRIDE NAIL 6 OR MORE: CPT | Mod: Q9,S$GLB,, | Performed by: PODIATRIST

## 2020-06-30 PROCEDURE — 99999 PR PBB SHADOW E&M-EST. PATIENT-LVL IV: ICD-10-PCS | Mod: PBBFAC,,, | Performed by: PODIATRIST

## 2020-06-30 PROCEDURE — 99499 UNLISTED E&M SERVICE: CPT | Mod: S$GLB,,, | Performed by: PODIATRIST

## 2020-06-30 PROCEDURE — 99499 NO LOS: ICD-10-PCS | Mod: S$GLB,,, | Performed by: PODIATRIST

## 2020-06-30 RX ORDER — CALCIUM CITRATE/VITAMIN D3 200MG-6.25
TABLET ORAL
COMMUNITY
Start: 2020-06-23 | End: 2020-10-06 | Stop reason: SDUPTHER

## 2020-07-01 NOTE — PROGRESS NOTES
Subjective:      Patient ID: MARIA LUISA Green is a 69 y.o. female.    Chief Complaint: Nail Care, Foot Problem (left 5th - hit toe about 1 mo ago), and Diabetic Foot Exam (PCP:  Dr Deleon 20; HgbA1c:  4/15/20  6.2)    MARIA LUISA NEVAREZ is a 69 y.o. female who presents to the clinic for evaluation and treatment of diabetic feet. MARIA LUISA NEVAREZ has a past medical history of Anxiety, Celiac disease (2018), Celiac disease, Depression, Family history of breast cancer in mother, H/O mammogram (2018), bone density study (2014), Hyperlipidemia, Hypertension, Menopause, Peptic ulcer, Reflux esophagitis, Urinary tract infection, Vaginal infection, and Vaginal Pap smear (2014). Patient relates stubbing the Rt. 5th toe over one month ago on a piece of furniture.  States it was initially swollen and tender, but notes both symptoms have since resolved.  Relates having toenails that are in need of trimming.  Has not attempted to self treat on account of being diabetic.  Lastly, notes having tried topical frankincense and myrrh oil which seems to curb her lower extremity neuropathy pain.  Notes applying this twice daily with marked improvement in symptoms.  Denies any additional pedal complaints.    PCP: Pavel Calixto MD    Date Last Seen by PCP:   Hemoglobin A1C   Date Value Ref Range Status   04/15/2020 6.2 (H) 4.0 - 5.6 % Final     Comment:     ADA Screening Guidelines:  5.7-6.4%  Consistent with prediabetes  >or=6.5%  Consistent with diabetes  High levels of fetal hemoglobin interfere with the HbA1C  assay. Heterozygous hemoglobin variants (HbS, HgC, etc)do  not significantly interfere with this assay.   However, presence of multiple variants may affect accuracy.     10/23/2019 6.8 % Final         Past Medical History:   Diagnosis Date    Anxiety     Celiac disease 2018    Celiac disease     Depression     Family history of breast cancer in mother      at age 68    H/O mammogram  08/06/2018    Birads 2/B  (Dr Deleon, Orders MMG yearly)     Hx of bone density study 04/07/2014    Normal     Hyperlipidemia     Hypertension     Menopause     Peptic ulcer     Reflux esophagitis     Urinary tract infection     Vaginal infection     Vaginal Pap smear 04/07/2014    Pap/Hpv Negative        Past Surgical History:   Procedure Laterality Date    APPENDECTOMY      CARPAL TUNNEL RELEASE Bilateral 09/2017    ,     COLONOSCOPY  04/2018    Normal  (Cornell Velazco)     DILATION AND CURETTAGE OF UTERUS  1986    DUPUYTREN CONTRACTURE RELEASE Bilateral 09/2017    HYSTERECTOMY  1987    BEAU w/ appy, no BSO     SHOULDER SURGERY  2009 & 2010    right rotator cuff    TONSILLECTOMY      UPPER GASTROINTESTINAL ENDOSCOPY  04/2018       Family History   Problem Relation Age of Onset    Breast cancer Mother     Cancer Mother     Hyperlipidemia Sister     Breast cancer Paternal Aunt     Cancer Paternal Aunt     Kidney disease Neg Hx        Social History     Socioeconomic History    Marital status:      Spouse name: Not on file    Number of children: Not on file    Years of education: Not on file    Highest education level: Not on file   Occupational History    Not on file   Social Needs    Financial resource strain: Not hard at all    Food insecurity     Worry: Never true     Inability: Never true    Transportation needs     Medical: No     Non-medical: No   Tobacco Use    Smoking status: Never Smoker    Smokeless tobacco: Never Used   Substance and Sexual Activity    Alcohol use: No     Frequency: Never     Drinks per session: Patient refused     Binge frequency: Never    Drug use: No    Sexual activity: Not Currently     Birth control/protection: See Surgical Hx, Surgical     Comment: :    Lifestyle    Physical activity     Days per week: 5 days     Minutes per session: 60 min    Stress: Only a little   Relationships    Social connections     Talks on phone:  More than three times a week     Gets together: Once a week     Attends [employer] service: Not on file     Active member of club or organization: Yes     Attends meetings of clubs or organizations: More than 4 times per year     Relationship status:    Other Topics Concern    Not on file   Social History Narrative    Not on file       Current Outpatient Medications   Medication Sig Dispense Refill    ACCU-CHEK COMPACT PLUS TEST Strp   3    ACCU-CHEK SOFTCLIX LANCETS Misc Inject 1 lancet as directed 2 (two) times a day. 200 each 3    alpha lipoic acid 600 mg Cap       atorvastatin (LIPITOR) 40 MG tablet Take 1 tablet (40 mg total) by mouth once daily. 90 tablet 3    blood-glucose meter kit Use as instructed.  Accu-chek Compact 1 each 0    calcium carbonate/vitamin D3 (CALTRATE WITH VITAMIN D3 ORAL) Take by mouth.      cinnamon bark (CINNAMON ORAL) Take by mouth.      coenzyme Q10 400 mg Cap Take by mouth.      flaxseed oil 1,000 mg Cap once a day      gabapentin (NEURONTIN) 400 MG capsule   3    gluc-condr-om3-dha-epa-fish-st (GLUCOSAMINE CHONDROITIN PLUS) 454-226-88-54 mg Cap once a day      KRILL OIL ORAL Take by mouth.      metFORMIN (GLUCOPHAGE) 500 MG tablet Take 1 tablet (500 mg total) by mouth 2 (two) times daily with meals. 180 tablet 3    MILK THISTLE ORAL Take by mouth.      pantoprazole (PROTONIX) 40 MG tablet Take 1 tablet (40 mg total) by mouth once daily. 90 tablet 3    polyethylene glycol (GLYCOLAX) 17 gram PwPk Take by mouth.      temazepam (RESTORIL) 30 mg capsule TAKE 1 CAPSULE (30 MG TOTAL) BY MOUTH NIGHTLY AS NEEDED FOR INSOMNIA. 30 capsule 3    TRUE METRIX GLUCOSE TEST STRIP Strp       venlafaxine (EFFEXOR-XR) 75 MG 24 hr capsule Take 1 capsule (75 mg total) by mouth once daily. 90 capsule 3    vit C,Q-Kd-ppfhn-lutein-zeaxan (PRESERVISION AREDS 2) 587-284-05-1 mg-unit-mg-mg Cap       glucosamine HCl 1,500 mg Tab Take 1,500 mg by mouth.      vitamins   A,C,E-zinc-copper (PRESERVISION AREDS) 14,048-014-200 unit-mg-unit Cap Take 1 tablet by mouth once daily.       No current facility-administered medications for this visit.        Review of patient's allergies indicates:   Allergen Reactions    Crestor [rosuvastatin] Swelling         Review of Systems   Constitution: Negative for chills and fever.   Cardiovascular: Negative for claudication and leg swelling.   Skin: Positive for color change and nail changes.   Musculoskeletal: Positive for joint pain. Negative for joint swelling, muscle cramps and muscle weakness.   Neurological: Positive for numbness and paresthesias.   Psychiatric/Behavioral: Negative for altered mental status.           Objective:      Physical Exam  Constitutional:       General: She is not in acute distress.     Appearance: She is well-developed. She is not diaphoretic.   Cardiovascular:      Pulses:           Dorsalis pedis pulses are 2+ on the right side and 2+ on the left side.        Posterior tibial pulses are 1+ on the right side and 1+ on the left side.      Comments: CFT is < 3 seconds bilateral.  Pedal hair growth is decreased bilateral.  Mild non pitting lower extremity edema noted bilateral.  Toes are warm to touch bilateral.  Musculoskeletal:         General: No tenderness.      Comments: Muscle strength 5/5 in all muscle groups bilateral.  No tenderness nor crepitation with ROM of foot/ankle joints bilateral.  No tenderness with palpation of bilateral foot and ankle.  Bilateral pes cavus foot type.  Semi-reducible contracture of toes 2-5 bilateral.   Skin:     General: Skin is warm and dry.      Capillary Refill: Capillary refill takes less than 2 seconds.      Coloration: Skin is not pale.      Findings: No abrasion, bruising, burn, ecchymosis, erythema, lesion, petechiae or rash.      Comments: Pedal skin appears thin bilateral.  Toenails x 10 appear thickened by 2 mm's, elongated by 4 mm's, and discolored with subungual  debris. Examination of the skin reveals no evidence of significant maceration, rashes, open lesions, suspicious appearing nevi or other concerning lesions.    Neurological:      Mental Status: She is alert and oriented to person, place, and time.      Sensory: Sensory deficit present.      Comments: Protective sensation per [place] monofilament is decreased bilateral.  Vibratory sensation is decreased bilateral.  Light touch is intact bilateral.               Assessment:       Encounter Diagnoses   Name Primary?    Diabetic polyneuropathy associated with type 2 diabetes mellitus Yes    Paresthesia of both feet     Onychomycosis due to dermatophyte          Plan:       MARIA LUISA NEVAREZ was seen today for nail care, foot problem and diabetic foot exam.    Diagnoses and all orders for this visit:    Diabetic polyneuropathy associated with type 2 diabetes mellitus    Paresthesia of both feet    Onychomycosis due to dermatophyte      I counseled the patient on her conditions, their implications and medical management.    Patient to continue with the topical analgesic, as this provides substantial relief for neuropathy related paresthesias.      Shoe inspection. Diabetic Foot Education. Patient reminded of the importance of good nutrition and blood sugar control to help prevent podiatric complications of diabetes. Patient instructed on proper foot hygeine. We discussed wearing proper shoe gear, daily foot inspections, never walking without protective shoe gear, never putting sharp instruments to feet    With patient's permission, nails were aggressively reduced and debrided x 10 to their soft tissue attachment mechanically and with electric , removing all offending nail and debris. Patient relates relief following the procedure. She will continue to monitor the areas daily, inspect her feet, wear protective shoe gear when ambulatory, moisturizer to maintain skin integrity and follow in this office in  approximately 2-3 months, sooner p.r.n.    Follow up in about 3 months (around 9/30/2020).    Lucas Bryan DPM

## 2020-07-14 ENCOUNTER — PATIENT MESSAGE (OUTPATIENT)
Dept: FAMILY MEDICINE | Facility: CLINIC | Age: 70
End: 2020-07-14

## 2020-07-15 RX ORDER — PANTOPRAZOLE SODIUM 40 MG/1
40 TABLET, DELAYED RELEASE ORAL DAILY
Qty: 90 TABLET | Refills: 3 | Status: SHIPPED | OUTPATIENT
Start: 2020-07-15 | End: 2021-06-23

## 2020-07-27 ENCOUNTER — PATIENT MESSAGE (OUTPATIENT)
Dept: FAMILY MEDICINE | Facility: CLINIC | Age: 70
End: 2020-07-27

## 2020-07-27 DIAGNOSIS — F51.01 PRIMARY INSOMNIA: ICD-10-CM

## 2020-07-27 RX ORDER — TEMAZEPAM 30 MG/1
30 CAPSULE ORAL NIGHTLY PRN
Qty: 30 CAPSULE | Refills: 3 | Status: SHIPPED | OUTPATIENT
Start: 2020-07-27 | End: 2020-11-16

## 2020-08-27 ENCOUNTER — PATIENT MESSAGE (OUTPATIENT)
Dept: FAMILY MEDICINE | Facility: CLINIC | Age: 70
End: 2020-08-27

## 2020-09-02 ENCOUNTER — PATIENT MESSAGE (OUTPATIENT)
Dept: FAMILY MEDICINE | Facility: CLINIC | Age: 70
End: 2020-09-02

## 2020-09-03 NOTE — TELEPHONE ENCOUNTER
Called and spoke with pt in regards of her recent liver enzymes. Pt verbalized understanding of message.

## 2020-10-03 ENCOUNTER — IMMUNIZATION (OUTPATIENT)
Dept: FAMILY MEDICINE | Facility: CLINIC | Age: 70
End: 2020-10-03
Payer: MEDICARE

## 2020-10-03 PROCEDURE — G0008 ADMIN INFLUENZA VIRUS VAC: HCPCS | Mod: S$GLB,,, | Performed by: INTERNAL MEDICINE

## 2020-10-03 PROCEDURE — G0008 FLU VACCINE - QUADRIVALENT - ADJUVANTED: ICD-10-PCS | Mod: S$GLB,,, | Performed by: INTERNAL MEDICINE

## 2020-10-03 PROCEDURE — 90694 FLU VACCINE - QUADRIVALENT - ADJUVANTED: ICD-10-PCS | Mod: S$GLB,,, | Performed by: INTERNAL MEDICINE

## 2020-10-03 PROCEDURE — 90694 VACC AIIV4 NO PRSRV 0.5ML IM: CPT | Mod: S$GLB,,, | Performed by: INTERNAL MEDICINE

## 2020-10-05 ENCOUNTER — PATIENT OUTREACH (OUTPATIENT)
Dept: ADMINISTRATIVE | Facility: OTHER | Age: 70
End: 2020-10-05

## 2020-10-05 ENCOUNTER — PATIENT MESSAGE (OUTPATIENT)
Dept: INTERNAL MEDICINE | Facility: CLINIC | Age: 70
End: 2020-10-05

## 2020-10-05 NOTE — PROGRESS NOTES
Health Maintenance Due   Topic Date Due    TETANUS VACCINE  08/02/1968    Shingles Vaccine (1 of 2) 08/02/2000    DEXA SCAN  04/07/2017    Mammogram  11/04/2020     Updates were requested from care everywhere.  Chart was reviewed for overdue Proactive [facility] En[facility]ters ([facility]) topics (CRS, Breast Cancer Screening, Eye exam)  Health Maintenance has been updated.  LINKS immunization registry triggered.  Immunizations were reconciled.

## 2020-10-06 ENCOUNTER — OFFICE VISIT (OUTPATIENT)
Dept: PODIATRY | Facility: CLINIC | Age: 70
End: 2020-10-06
Payer: MEDICARE

## 2020-10-06 ENCOUNTER — PATIENT MESSAGE (OUTPATIENT)
Dept: FAMILY MEDICINE | Facility: CLINIC | Age: 70
End: 2020-10-06

## 2020-10-06 ENCOUNTER — PATIENT MESSAGE (OUTPATIENT)
Dept: PODIATRY | Facility: CLINIC | Age: 70
End: 2020-10-06

## 2020-10-06 VITALS — HEIGHT: 65 IN | BODY MASS INDEX: 22.81 KG/M2 | WEIGHT: 136.88 LBS

## 2020-10-06 DIAGNOSIS — B35.1 ONYCHOMYCOSIS DUE TO DERMATOPHYTE: ICD-10-CM

## 2020-10-06 DIAGNOSIS — E11.42 DIABETIC POLYNEUROPATHY ASSOCIATED WITH TYPE 2 DIABETES MELLITUS: Primary | ICD-10-CM

## 2020-10-06 PROCEDURE — 99999 PR PBB SHADOW E&M-EST. PATIENT-LVL II: CPT | Mod: PBBFAC,,, | Performed by: PODIATRIST

## 2020-10-06 PROCEDURE — 11721 PR DEBRIDEMENT OF NAILS, 6 OR MORE: ICD-10-PCS | Mod: Q9,S$GLB,, | Performed by: PODIATRIST

## 2020-10-06 PROCEDURE — 99999 PR PBB SHADOW E&M-EST. PATIENT-LVL II: ICD-10-PCS | Mod: PBBFAC,,, | Performed by: PODIATRIST

## 2020-10-06 PROCEDURE — 99499 UNLISTED E&M SERVICE: CPT | Mod: S$GLB,,, | Performed by: PODIATRIST

## 2020-10-06 PROCEDURE — 99499 NO LOS: ICD-10-PCS | Mod: S$GLB,,, | Performed by: PODIATRIST

## 2020-10-06 PROCEDURE — 11721 DEBRIDE NAIL 6 OR MORE: CPT | Mod: Q9,S$GLB,, | Performed by: PODIATRIST

## 2020-10-06 RX ORDER — FINASTERIDE 5 MG/1
TABLET, FILM COATED ORAL
Status: ON HOLD | COMMUNITY
Start: 2020-10-03 | End: 2023-02-07

## 2020-10-06 RX ORDER — CALCIUM CITRATE/VITAMIN D3 200MG-6.25
1 TABLET ORAL 2 TIMES DAILY
Qty: 200 EACH | Refills: 3 | Status: SHIPPED | OUTPATIENT
Start: 2020-10-06 | End: 2021-01-04

## 2020-10-06 RX ORDER — DICYCLOMINE HYDROCHLORIDE 10 MG/1
CAPSULE ORAL
Status: ON HOLD | COMMUNITY
Start: 2020-09-10 | End: 2024-02-06 | Stop reason: HOSPADM

## 2020-10-07 NOTE — PROGRESS NOTES
Subjective:      Patient ID: MARIA LUISA Green is a 70 y.o. female.    Chief Complaint: Diabetes Mellitus (Pancho 10/27/20) and Diabetic Foot Exam    MARIA LUISA NEVAREZ is a 70 y.o. female who presents to the clinic for evaluation and treatment of diabetic feet. MARIA LUISA NEVAREZ has a past medical history of Anxiety, Celiac disease (2018), Celiac disease, Depression, Family history of breast cancer in mother, H/O mammogram (2018), bone density study (2014), Hyperlipidemia, Hypertension, Menopause, Peptic ulcer, Reflux esophagitis, Urinary tract infection, Vaginal infection, and Vaginal Pap smear (2014). Relates having toenails that are in need of trimming.  Denies being painful with wearing shoe gear, however, is afraid of possibly avulsing the nail plates on account of their thickness.  Has not attempted to trim on her own.  Relates continued applying of topical analgesics to address neuropathy pain in the feet.  Inquires as to the safest way to discontinue taking gabapentin, as she feels it has no real effect on her symptoms.  Notes continued control over her blood glucose.   Denies any additional pedal complaints.    PCP: Pavel Calixto MD    Date Last Seen by PCP: 10/20  Hemoglobin A1C   Date Value Ref Range Status   04/15/2020 6.2 (H) 4.0 - 5.6 % Final     Comment:     ADA Screening Guidelines:  5.7-6.4%  Consistent with prediabetes  >or=6.5%  Consistent with diabetes  High levels of fetal hemoglobin interfere with the HbA1C  assay. Heterozygous hemoglobin variants (HbS, HgC, etc)do  not significantly interfere with this assay.   However, presence of multiple variants may affect accuracy.     10/23/2019 6.8 % Final         Past Medical History:   Diagnosis Date    Anxiety     Celiac disease 2018    Celiac disease     Depression     Family history of breast cancer in mother      at age 68    H/O mammogram 2018    Birads 2/B  (Dr Deleon, Orders MMG yearly)     Hx of bone  density study 04/07/2014    Normal     Hyperlipidemia     Hypertension     Menopause     Peptic ulcer     Reflux esophagitis     Urinary tract infection     Vaginal infection     Vaginal Pap smear 04/07/2014    Pap/Hpv Negative        Past Surgical History:   Procedure Laterality Date    APPENDECTOMY      CARPAL TUNNEL RELEASE Bilateral 09/2017    ,     COLONOSCOPY  04/2018    Normal  (Cornell Velazco)     DILATION AND CURETTAGE OF UTERUS  1986    DUPUYTREN CONTRACTURE RELEASE Bilateral 09/2017    HYSTERECTOMY  1987    BEAU w/ appy, no BSO     SHOULDER SURGERY  2009 & 2010    right rotator cuff    TONSILLECTOMY      UPPER GASTROINTESTINAL ENDOSCOPY  04/2018       Family History   Problem Relation Age of Onset    Breast cancer Mother     Cancer Mother     Hyperlipidemia Sister     Breast cancer Paternal Aunt     Cancer Paternal Aunt     Kidney disease Neg Hx        Social History     Socioeconomic History    Marital status:      Spouse name: Not on file    Number of children: Not on file    Years of education: Not on file    Highest education level: Not on file   Occupational History    Not on file   Social Needs    Financial resource strain: Not hard at all    Food insecurity     Worry: Never true     Inability: Never true    Transportation needs     Medical: No     Non-medical: No   Tobacco Use    Smoking status: Never Smoker    Smokeless tobacco: Never Used   Substance and Sexual Activity    Alcohol use: No     Frequency: Never     Drinks per session: Patient refused     Binge frequency: Never    Drug use: No    Sexual activity: Not Currently     Birth control/protection: See Surgical Hx, Surgical     Comment: :    Lifestyle    Physical activity     Days per week: 5 days     Minutes per session: 60 min    Stress: Only a little   Relationships    Social connections     Talks on phone: More than three times a week     Gets together: Once a week     Attends  Buddhist service: Not on file     Active member of club or organization: Yes     Attends meetings of clubs or organizations: More than 4 times per year     Relationship status:    Other Topics Concern    Not on file   Social History Narrative    Not on file       Current Outpatient Medications   Medication Sig Dispense Refill    ACCU-CHEK COMPACT PLUS TEST Strp   3    ACCU-CHEK SOFTCLIX LANCETS Misc Inject 1 lancet as directed 2 (two) times a day. 200 each 3    alpha lipoic acid 600 mg Cap       atorvastatin (LIPITOR) 40 MG tablet Take 1 tablet (40 mg total) by mouth once daily. 90 tablet 3    blood-glucose meter kit Use as instructed.  Accu-chek Compact 1 each 0    calcium carbonate/vitamin D3 (CALTRATE WITH VITAMIN D3 ORAL) Take by mouth.      cinnamon bark (CINNAMON ORAL) Take by mouth.      coenzyme Q10 400 mg Cap Take by mouth.      dicyclomine (BENTYL) 10 MG capsule       finasteride (PROSCAR) 5 mg tablet       flaxseed oil 1,000 mg Cap once a day      gabapentin (NEURONTIN) 400 MG capsule   3    gluc-condr-om3-dha-epa-fish-st (GLUCOSAMINE CHONDROITIN PLUS) 625-816-60-54 mg Cap once a day      glucosamine HCl 1,500 mg Tab Take 1,500 mg by mouth.      KRILL OIL ORAL Take by mouth.      metFORMIN (GLUCOPHAGE) 500 MG tablet Take 1 tablet (500 mg total) by mouth 2 (two) times daily with meals. 180 tablet 3    MILK THISTLE ORAL Take by mouth.      pantoprazole (PROTONIX) 40 MG tablet Take 1 tablet (40 mg total) by mouth once daily. 90 tablet 3    polyethylene glycol (GLYCOLAX) 17 gram PwPk Take by mouth.      temazepam (RESTORIL) 30 mg capsule Take 1 capsule (30 mg total) by mouth nightly as needed for Insomnia. 30 capsule 3    TRUE METRIX GLUCOSE TEST STRIP Strp 1 strip by Misc.(Non-Drug; Combo Route) route 2 (two) times a day. 200 each 3    venlafaxine (EFFEXOR-XR) 75 MG 24 hr capsule Take 1 capsule (75 mg total) by mouth once daily. 90 capsule 3    vit  C,O-Kq-zwwgd-lutein-zeaxan (PRESERVISION AREDS 2) 317-380-25-1 mg-unit-mg-mg Cap       vitamins  A,C,E-zinc-copper (PRESERVISION AREDS) 14,320-226-200 unit-mg-unit Cap Take 1 tablet by mouth once daily.       No current facility-administered medications for this visit.        Review of patient's allergies indicates:   Allergen Reactions    Crestor [rosuvastatin] Swelling         Review of Systems   Constitution: Negative for chills and fever.   Cardiovascular: Negative for claudication and leg swelling.   Skin: Positive for color change and nail changes.   Musculoskeletal: Positive for joint pain. Negative for joint swelling, muscle cramps and muscle weakness.   Neurological: Positive for numbness. Negative for paresthesias.   Psychiatric/Behavioral: Negative for altered mental status.           Objective:      Physical Exam  Constitutional:       General: She is not in acute distress.     Appearance: She is well-developed. She is not diaphoretic.   Cardiovascular:      Pulses:           Dorsalis pedis pulses are 2+ on the right side and 2+ on the left side.        Posterior tibial pulses are 1+ on the right side and 1+ on the left side.      Comments: CFT is < 3 seconds bilateral.  Pedal hair growth is decreased bilateral.  Mild non pitting lower extremity edema noted bilateral.  Toes are warm to touch bilateral.  Musculoskeletal:         General: No tenderness.      Comments: Muscle strength 5/5 in all muscle groups bilateral.  No tenderness nor crepitation with ROM of foot/ankle joints bilateral.  No tenderness with palpation of bilateral foot and ankle.  Bilateral pes cavus foot type. Semi-reducible contracture of toes 2-5 bilateral.   Skin:     General: Skin is warm and dry.      Capillary Refill: Capillary refill takes less than 2 seconds.      Coloration: Skin is not pale.      Findings: No abrasion, bruising, burn, ecchymosis, erythema, lesion, petechiae or rash.      Comments: Pedal skin appears thin  bilateral.  Toenails x 10 appear thickened by 2 mm's, elongated by 6 mm's, and discolored with subungual debris.  Xerosis noted to bilateral heel.  Examination of the skin reveals no evidence of significant maceration, rashes, open lesions, suspicious appearing nevi or other concerning lesions.    Neurological:      Mental Status: She is alert and oriented to person, place, and time.      Sensory: Sensory deficit present.      Comments: Protective sensation per [place]Corazon monofilament is decreased bilateral.  Vibratory sensation is decreased bilateral.  Light touch is intact bilateral.               Assessment:       Encounter Diagnoses   Name Primary?    Diabetic polyneuropathy associated with type 2 diabetes mellitus Yes    Onychomycosis due to dermatophyte          Plan:       MARIA LUISA NEVAREZ was seen today for diabetes mellitus and diabetic foot exam.    Diagnoses and all orders for this visit:    Diabetic polyneuropathy associated with type 2 diabetes mellitus    Onychomycosis due to dermatophyte      I counseled the patient on her conditions, their implications and medical management.    Advised to decrease the dosage of gabapentin to 600mg daily x 1 week. Patient to then decrease to 300mg daily x 1 final week prior to discontinuing.     Shoe inspection. Diabetic Foot Education. Patient reminded of the importance of good nutrition and blood sugar control to help prevent podiatric complications of diabetes. Patient instructed on proper foot hygeine. We discussed wearing proper shoe gear, daily foot inspections, never walking without protective shoe gear, never putting sharp instruments to feet    With patient's permission, nails were aggressively reduced and debrided x 10 to their soft tissue attachment mechanically and with electric , removing all offending nail and debris. Patient relates relief following the procedure. She will continue to monitor the areas daily, inspect her feet, wear protective  shoe gear when ambulatory, moisturizer to maintain skin integrity and follow in this office in approximately 2-3 months, sooner p.r.n.    Follow up in about 3 months (around 1/6/2021).    Lucas Bryan DPM

## 2020-10-27 ENCOUNTER — OFFICE VISIT (OUTPATIENT)
Dept: FAMILY MEDICINE | Facility: CLINIC | Age: 70
End: 2020-10-27
Payer: MEDICARE

## 2020-10-27 VITALS
DIASTOLIC BLOOD PRESSURE: 70 MMHG | SYSTOLIC BLOOD PRESSURE: 116 MMHG | HEIGHT: 65 IN | TEMPERATURE: 97 F | OXYGEN SATURATION: 98 % | BODY MASS INDEX: 21.96 KG/M2 | WEIGHT: 131.81 LBS | HEART RATE: 80 BPM

## 2020-10-27 DIAGNOSIS — K90.0 CELIAC DISEASE: ICD-10-CM

## 2020-10-27 DIAGNOSIS — F33.0 MILD RECURRENT MAJOR DEPRESSION: ICD-10-CM

## 2020-10-27 DIAGNOSIS — F51.01 PRIMARY INSOMNIA: ICD-10-CM

## 2020-10-27 DIAGNOSIS — E78.2 HYPERLIPIDEMIA, MIXED: ICD-10-CM

## 2020-10-27 DIAGNOSIS — M25.561 CHRONIC PAIN OF BOTH KNEES: ICD-10-CM

## 2020-10-27 DIAGNOSIS — K21.9 GERD WITHOUT ESOPHAGITIS: ICD-10-CM

## 2020-10-27 DIAGNOSIS — E11.42 TYPE 2 DIABETES MELLITUS WITH DIABETIC POLYNEUROPATHY, WITHOUT LONG-TERM CURRENT USE OF INSULIN: Primary | ICD-10-CM

## 2020-10-27 DIAGNOSIS — G89.29 CHRONIC PAIN OF BOTH KNEES: ICD-10-CM

## 2020-10-27 DIAGNOSIS — Z12.31 SCREENING MAMMOGRAM, ENCOUNTER FOR: ICD-10-CM

## 2020-10-27 DIAGNOSIS — G25.0 BENIGN ESSENTIAL TREMOR: ICD-10-CM

## 2020-10-27 DIAGNOSIS — M19.049 HAND ARTHRITIS: ICD-10-CM

## 2020-10-27 DIAGNOSIS — M25.562 CHRONIC PAIN OF BOTH KNEES: ICD-10-CM

## 2020-10-27 DIAGNOSIS — M50.30 DDD (DEGENERATIVE DISC DISEASE), CERVICAL: ICD-10-CM

## 2020-10-27 DIAGNOSIS — K58.9 IRRITABLE BOWEL SYNDROME, UNSPECIFIED TYPE: ICD-10-CM

## 2020-10-27 PROCEDURE — 1159F MED LIST DOCD IN RCRD: CPT | Mod: S$GLB,,, | Performed by: INTERNAL MEDICINE

## 2020-10-27 PROCEDURE — 3008F PR BODY MASS INDEX (BMI) DOCUMENTED: ICD-10-PCS | Mod: CPTII,S$GLB,, | Performed by: INTERNAL MEDICINE

## 2020-10-27 PROCEDURE — 99214 PR OFFICE/OUTPT VISIT, EST, LEVL IV, 30-39 MIN: ICD-10-PCS | Mod: S$GLB,,, | Performed by: INTERNAL MEDICINE

## 2020-10-27 PROCEDURE — 1101F PT FALLS ASSESS-DOCD LE1/YR: CPT | Mod: CPTII,S$GLB,, | Performed by: INTERNAL MEDICINE

## 2020-10-27 PROCEDURE — 3044F HG A1C LEVEL LT 7.0%: CPT | Mod: CPTII,S$GLB,, | Performed by: INTERNAL MEDICINE

## 2020-10-27 PROCEDURE — 99999 PR PBB SHADOW E&M-EST. PATIENT-LVL V: CPT | Mod: PBBFAC,,, | Performed by: INTERNAL MEDICINE

## 2020-10-27 PROCEDURE — 1101F PR PT FALLS ASSESS DOC 0-1 FALLS W/OUT INJ PAST YR: ICD-10-PCS | Mod: CPTII,S$GLB,, | Performed by: INTERNAL MEDICINE

## 2020-10-27 PROCEDURE — 1126F PR PAIN SEVERITY QUANTIFIED, NO PAIN PRESENT: ICD-10-PCS | Mod: S$GLB,,, | Performed by: INTERNAL MEDICINE

## 2020-10-27 PROCEDURE — 3044F PR MOST RECENT HEMOGLOBIN A1C LEVEL <7.0%: ICD-10-PCS | Mod: CPTII,S$GLB,, | Performed by: INTERNAL MEDICINE

## 2020-10-27 PROCEDURE — 1126F AMNT PAIN NOTED NONE PRSNT: CPT | Mod: S$GLB,,, | Performed by: INTERNAL MEDICINE

## 2020-10-27 PROCEDURE — 99999 PR PBB SHADOW E&M-EST. PATIENT-LVL V: ICD-10-PCS | Mod: PBBFAC,,, | Performed by: INTERNAL MEDICINE

## 2020-10-27 PROCEDURE — 1159F PR MEDICATION LIST DOCUMENTED IN MEDICAL RECORD: ICD-10-PCS | Mod: S$GLB,,, | Performed by: INTERNAL MEDICINE

## 2020-10-27 PROCEDURE — 99214 OFFICE O/P EST MOD 30 MIN: CPT | Mod: S$GLB,,, | Performed by: INTERNAL MEDICINE

## 2020-10-27 PROCEDURE — 3008F BODY MASS INDEX DOCD: CPT | Mod: CPTII,S$GLB,, | Performed by: INTERNAL MEDICINE

## 2020-10-27 NOTE — PROGRESS NOTES
[facility] Note    Chief Complaint      Chief Complaint   Patient presents with    Diabetes     6 month f/u       History of Present Illness      MARIA LUISA Green is a 70 y.o. female with chronic conditions of DM2 with neuropathy, HLD, GERD, celiac disease, depression, insomnia, chronic neck pain who presents today for: follow up chronic conditions.  Complains of hand tremors, present for the past year, mostly left, mostly when holding her phone.  Does not occur at rest. Does not recall her parents having problems with tremors.  Also complains of bilateral thumb pains.  Has seen Dr. Leos in the past for trigger finger release and duputreyns contracture surgery.  Complains of bilateral knee pains.  Has not had any recent evaluation or imaging.    DM2 with neuropathy: Controlled on metformin.  Recent A1C 6.0.  Eye exam UTD with Dr. Rivera 12/2019.  Foot exam UTD with Dr. Bryan.  HLD: Controlled on lipitor.   increased from last check.  Has been eating more cottage cheese and ice cream lately, will stop.  GERD: Controlled on pantoprazole.  No new or worsening symptoms.  Depression: Controlled on effexor.  No new or worsening symptoms  Insomnia: Controlled on restoril.  Control is satisfactory on this regimen.  Chronic neck pain 2/2 cervical degenerative disc disease: Sees Dr. Bunch. No new exacerbations.  Off gabapentin 2/2 no perceived benefit.  Now using topical neuropathy treatment called Frankincense and Myrrh for feet.    Celiac disease, IBS: previously saw Dr. Amor.  Avoiding triggers to avoid exacerbations.  Doing well with diet control. Saw Dr. Hutton who started on dicyclomine which has helped   Flu shot UTD.  TdAP 2015.  Prevnar UTD, pneumovax UTD.  Shingrix discussed.  Mammogram declines.  DEXA 2015, normal.  Cscope 2018, Dr. Amor, no polyps, 5 yr interval.    Past Medical History:  Past Medical History:   Diagnosis Date    Anxiety     Celiac disease 05/2018     Celiac disease     Depression     Family history of breast cancer in mother      at age 68    H/O mammogram 2018    Birads 2/B  (Dr Deleon, Orders MMG yearly)     Hx of bone density study 2014    Normal     Hyperlipidemia     Hypertension     Menopause     Peptic ulcer     Reflux esophagitis     Urinary tract infection     Vaginal infection     Vaginal Pap smear 2014    Pap/Hpv Negative        Past Surgical History:   has a past surgical history that includes Tonsillectomy; Appendectomy; Dilation and curettage of uterus (); Hysterectomy (); Carpal tunnel release (Bilateral, 2017); Shoulder surgery ( & ); Colonoscopy (2018); Upper gastrointestinal endoscopy (2018); and Dupuytren contracture release (Bilateral, 2017).    Family History:  family history includes Breast cancer in her mother and paternal aunt; Cancer in her mother and paternal aunt; Diabetes in her paternal grandfather; Hyperlipidemia in her sister; Vision loss in her father and mother.     Social History:  Social History     Tobacco Use    Smoking status: Never Smoker    Smokeless tobacco: Never Used   Substance Use Topics    Alcohol use: No     Frequency: Never     Drinks per session: Patient refused     Binge frequency: Never    Drug use: No       I personally reviewed all past medical, surgical, social and family history.    Review of Systems   Constitutional: Negative for chills, fever and malaise/fatigue.   HENT: Negative for hearing loss.    Eyes: Negative for discharge.   Respiratory: Negative for shortness of breath and wheezing.    Cardiovascular: Negative for chest pain and palpitations.   Gastrointestinal: Negative for blood in stool, constipation, diarrhea, nausea and vomiting.   Genitourinary: Negative for dysuria and hematuria.   Musculoskeletal: Negative for neck pain.   Skin: Negative for rash.   Neurological: Negative for weakness and headaches.    Endo/Heme/Allergies: Negative for polydipsia.   All other systems reviewed and are negative.       Medications:  Outpatient Encounter Medications as of 10/27/2020   Medication Sig Note Dispense Refill    alpha lipoic acid 600 mg Cap        atorvastatin (LIPITOR) 40 MG tablet Take 1 tablet (40 mg total) by mouth once daily.  90 tablet 3    calcium carbonate/vitamin D3 (CALTRATE WITH VITAMIN D3 ORAL) Take by mouth.       cinnamon bark (CINNAMON ORAL) Take by mouth.       coenzyme Q10 400 mg Cap Take by mouth.       dicyclomine (BENTYL) 10 MG capsule        finasteride (PROSCAR) 5 mg tablet        flaxseed oil 1,000 mg Cap once a day 10/23/2017: Received from: [facility] and [facility] Subsidiaries and Affiliates      gluc-condr-om3-dha-epa-fish-st (GLUCOSAMINE CHONDROITIN PLUS) 129-765-83-54 mg Cap once a day 10/23/2017: Received from: [facility] and Its Subsidiaries and Affiliates      glucosamine HCl 1,500 mg Tab Take 1,500 mg by mouth.       KRILL OIL ORAL Take by mouth.       metFORMIN (GLUCOPHAGE) 500 MG tablet Take 1 tablet (500 mg total) by mouth 2 (two) times daily with meals.  180 tablet 3    MILK THISTLE ORAL Take by mouth.       pantoprazole (PROTONIX) 40 MG tablet Take 1 tablet (40 mg total) by mouth once daily.  90 tablet 3    temazepam (RESTORIL) 30 mg capsule Take 1 capsule (30 mg total) by mouth nightly as needed for Insomnia.  30 capsule 3    venlafaxine (EFFEXOR-XR) 75 MG 24 hr capsule Take 1 capsule (75 mg total) by mouth once daily.  90 capsule 3    vitamins  A,C,E-zinc-copper (PRESERVISION AREDS) 14,320-226-200 unit-mg-unit Cap Take 1 tablet by mouth once daily.       ACCU-CHEK COMPACT PLUS TEST Strp    3    ACCU-CHEK SOFTCLIX LANCETS Misc Inject 1 lancet as directed 2 (two) times a day.  200 each 3    blood-glucose meter kit Use as instructed.  Accu-chek Compact  1 each 0    gabapentin (NEURONTIN) 400 MG capsule    3    polyethylene glycol (GLYCOLAX) 17 gram  "PwPk Take by mouth.       TRUE METRIX GLUCOSE TEST STRIP Strp 1 strip by Misc.(Non-Drug; Combo Route) route 2 (two) times a day.  200 each 3    vit C,W-Ia-xshdr-lutein-zeaxan (PRESERVISION AREDS 2) 547-150-13-1 mg-unit-mg-mg Cap         No facility-administered encounter medications on file as of 10/27/2020.        Allergies:  Review of patient's allergies indicates:   Allergen Reactions    Crestor [rosuvastatin] Swelling       Health Maintenance:  Immunization History   Administered Date(s) Administered    Influenza 11/30/2012    Influenza (FLUAD) - Quadrivalent - Adjuvanted - PF *Preferred* (65+) 10/03/2020    Influenza Split 10/15/2019    Pneumococcal Conjugate - 13 Valent 04/27/2018    Pneumococcal Polysaccharide - 23 Valent 10/14/2019      Health Maintenance   Topic Date Due    Hepatitis C Screening  1950    TETANUS VACCINE  08/02/1968    DEXA SCAN  04/07/2017    Mammogram  11/04/2020    Eye Exam  12/06/2020    Hemoglobin A1c  04/20/2021    Foot Exam  10/06/2021    Lipid Panel  10/20/2021    Urine Microalbumin  10/20/2021    Low Dose Statin  10/27/2021    Pneumococcal Vaccine (65+ Low/Medium Risk)  Completed        Physical Exam      Vital Signs  Temp: 97.3 °F (36.3 °C)  Temp src: Oral  Pulse: 80  SpO2: 98 %  BP: 116/70  BP Location: Right arm  Patient Position: Sitting  Pain Score: 0-No pain  Height and Weight  Height: 5' 5" (165.1 cm)  Weight: 59.8 kg (131 lb 13.4 oz)  BSA (Calculated - sq m): 1.66 sq meters  BMI (Calculated): 21.9  Weight in (lb) to have BMI = 25: 149.9]    Physical Exam  Vitals signs reviewed.   Constitutional:       Appearance: She is well-developed.   HENT:      Head: Normocephalic and atraumatic.      Right Ear: External ear normal.      Left Ear: External ear normal.   Eyes:      Conjunctiva/sclera: Conjunctivae normal.      Pupils: Pupils are equal, round, and reactive to light.   Neck:      Vascular: No carotid bruit.   Cardiovascular:      Rate and Rhythm: " Normal rate and regular rhythm.      Heart sounds: Normal heart sounds. No murmur.   Pulmonary:      Effort: Pulmonary effort is normal.      Breath sounds: Normal breath sounds. No wheezing or rales.   Abdominal:      General: Bowel sounds are normal. There is no distension.      Palpations: Abdomen is soft.      Tenderness: There is no abdominal tenderness.          Laboratory:  CBC:  Recent Labs   Lab 10/20/20  0842   WBC 6.45   RBC 3.90 L   Hemoglobin 12.3   Hematocrit 38.3   Platelets 285   MCV 98   MCH 31.5 H   MCHC 32.1     CMP:  Recent Labs   Lab 04/15/20  0907 10/20/20  0842   Glucose 137 H 134 H   Calcium 10.5 9.8   Albumin 4.8 4.4   Total Protein 9.1 H 7.7   Sodium 139 138   Potassium 4.5 4.7   CO2 29 29   Chloride 101 101   BUN 18 H 21 H   Alkaline Phosphatase 124 112   ALT 30 44   AST 38 38   Total Bilirubin 0.6 0.5     URINALYSIS:  Recent Labs   Lab 07/02/19  2122  04/15/20  0944   Color, UA Yellow   < > Yellow   Specific Gravity, UA 1.010  --  1.015   Spec Grav UA  --    < >  --    pH, UA 6.0   < > 7.0   Protein, UA 2+ A  --  Negative   Bacteria Occasional  --   --    Nitrite, UA Negative   < > Negative   Leukocytes, UA 1+ A  --  Negative   Urobilinogen, UA Negative   < > Negative   Hyaline Casts, UA 0  --   --     < > = values in this interval not displayed.      LIPIDS:  Recent Labs   Lab 10/23/19 10/31/19  1107 10/20/20  0842   TSH  --  1.321  --    HDL 53  --  52   Cholesterol 166  --  188   Triglycerides 102  --  95   LDL Cholesterol 93  --  117.0   HDL/Cholesterol Ratio  --   --  27.7   Non-HDL Cholesterol  --   --  136   Total Cholesterol/HDL Ratio  --   --  3.6     TSH:  Recent Labs   Lab 10/31/19  1107   TSH 1.321     A1C:  Recent Labs   Lab 10/23/19 04/15/20  0907 10/20/20  0842   Hemoglobin A1C 6.8 6.2 H 6.0 H       Assessment/Plan     MARIA LUISA Green is a 70 y.o.female with:    1. Type 2 diabetes mellitus with diabetic polyneuropathy, without long-term current use of  insulin  Continue current meds.  Labs reviewed.  Eye exam UTD  2. Hyperlipidemia, mixed  Continue current meds.  Labs reviewed, slightly worse than previous.  3. Mild recurrent major depression  Continue current meds.    4. GERD without esophagitis  Continue current meds.    5. Primary insomnia  Continue current meds.    6. Celiac disease  Cont with diet control.  7. DDD (degenerative disc disease), cervical  Continue current meds as needed.  F/U with Dr. Bunch.  8. Hand arthritis  Pt will call Dr. Leos for evaluation and possible steroid injections  9. Benign essential tremor  Recommend monitoring for now.  Discussed possible treatment options.  10. Chronic pain of both knees  - X-Ray Knee 3 View Bilateral; Future  Will start with xrays and if needed, refer to ortho.  11. Screening mammogram, encounter for  - Mammo Digital Screening Bilat w/ Abdoulaye; Future  - Mammo Digital Screening Bilat w/ Abdoulaye       Chronic conditions status updated as per HPI.  Other than changes above, cont current medications and maintain follow up with specialists.  Return to clinic in 6 months.    Pavel Calixto MD  [facility]                  Answers for HPI/ROS submitted by the patient on 10/20/2020   activity change: Yes  unexpected weight change: No  rhinorrhea: No  trouble swallowing: No  visual disturbance: No  chest tightness: No  polyuria: No  difficulty urinating: No  menstrual problem: No  joint swelling: No  arthralgias: Yes  confusion: No  dysphoric mood: No

## 2020-11-15 DIAGNOSIS — F51.01 PRIMARY INSOMNIA: ICD-10-CM

## 2020-11-15 NOTE — PROGRESS NOTES
Refill Routing Note   Medication(s) are not appropriate for processing by [facility] for the following reason(s):     - Outside of protocol    [facility] actions taken in this encounter: Route          Medication reconciliation completed: No   Automatic Epic Generated Protocol Data:        Requested Prescriptions   Pending Prescriptions Disp Refills    temazepam (RESTORIL) 30 mg capsule [Pharmacy Med Name: TEMAZEPAM 30 MG     CAP ASCE] 30 capsule      Sig: TAKE ONE CAPSULE BY MOUTH NIGHTLY AS NEEDED FOR INSOMNIA       [facility] Refill Protocol Passed - 11/15/2020 11:26 AM        Passed - Patient not pregnant        Passed - Patient seen within 3 months     Last visit with Pavel Calixto MD: 10/27/2020  Last visit in [facility]: 10/27/2020    Patient's next visit in [facility]: 4/27/2021           Passed - Med not refilled within 4 weeks              Appointments  past 12m or future 3m with PCP    Date Provider   Last Visit   10/27/2020 Pavel Calixto MD   Next Visit   4/27/2021 Pavel Calixto MD   ED visits in past 90 days: 0        Note composed:11:46 AM 11/15/2020

## 2020-11-16 RX ORDER — TEMAZEPAM 30 MG/1
CAPSULE ORAL
Qty: 30 CAPSULE | Refills: 2 | Status: SHIPPED | OUTPATIENT
Start: 2020-11-16 | End: 2021-02-09

## 2020-11-27 ENCOUNTER — PATIENT MESSAGE (OUTPATIENT)
Dept: UROLOGY | Facility: CLINIC | Age: 70
End: 2020-11-27

## 2020-11-27 ENCOUNTER — OFFICE VISIT (OUTPATIENT)
Dept: URGENT CARE | Facility: CLINIC | Age: 70
End: 2020-11-27
Payer: MEDICARE

## 2020-11-27 VITALS
HEIGHT: 65 IN | TEMPERATURE: 98 F | BODY MASS INDEX: 21.83 KG/M2 | WEIGHT: 131 LBS | SYSTOLIC BLOOD PRESSURE: 136 MMHG | DIASTOLIC BLOOD PRESSURE: 70 MMHG | HEART RATE: 76 BPM | OXYGEN SATURATION: 98 % | RESPIRATION RATE: 12 BRPM

## 2020-11-27 DIAGNOSIS — R35.0 URINARY FREQUENCY: Primary | ICD-10-CM

## 2020-11-27 DIAGNOSIS — N30.01 ACUTE CYSTITIS WITH HEMATURIA: ICD-10-CM

## 2020-11-27 LAB
BILIRUB UR QL STRIP: NEGATIVE
GLUCOSE UR QL STRIP: NEGATIVE
KETONES UR QL STRIP: NEGATIVE
LEUKOCYTE ESTERASE UR QL STRIP: POSITIVE
PH, POC UA: 6
POC BLOOD, URINE: POSITIVE
POC NITRATES, URINE: NEGATIVE
PROT UR QL STRIP: NEGATIVE
SP GR UR STRIP: 1.01 (ref 1–1.03)
UROBILINOGEN UR STRIP-ACNC: NORMAL (ref 0.1–1.1)

## 2020-11-27 PROCEDURE — 1126F AMNT PAIN NOTED NONE PRSNT: CPT | Mod: S$GLB,,, | Performed by: PHYSICIAN ASSISTANT

## 2020-11-27 PROCEDURE — 3008F PR BODY MASS INDEX (BMI) DOCUMENTED: ICD-10-PCS | Mod: CPTII,S$GLB,, | Performed by: PHYSICIAN ASSISTANT

## 2020-11-27 PROCEDURE — 99214 OFFICE O/P EST MOD 30 MIN: CPT | Mod: 25,S$GLB,, | Performed by: PHYSICIAN ASSISTANT

## 2020-11-27 PROCEDURE — 3008F BODY MASS INDEX DOCD: CPT | Mod: CPTII,S$GLB,, | Performed by: PHYSICIAN ASSISTANT

## 2020-11-27 PROCEDURE — 99214 PR OFFICE/OUTPT VISIT, EST, LEVL IV, 30-39 MIN: ICD-10-PCS | Mod: 25,S$GLB,, | Performed by: PHYSICIAN ASSISTANT

## 2020-11-27 PROCEDURE — 81003 URINALYSIS AUTO W/O SCOPE: CPT | Mod: QW,S$GLB,, | Performed by: PHYSICIAN ASSISTANT

## 2020-11-27 PROCEDURE — 81003 POCT URINALYSIS, DIPSTICK, AUTOMATED, W/O SCOPE: ICD-10-PCS | Mod: QW,S$GLB,, | Performed by: PHYSICIAN ASSISTANT

## 2020-11-27 PROCEDURE — 1126F PR PAIN SEVERITY QUANTIFIED, NO PAIN PRESENT: ICD-10-PCS | Mod: S$GLB,,, | Performed by: PHYSICIAN ASSISTANT

## 2020-11-27 RX ORDER — SULFAMETHOXAZOLE AND TRIMETHOPRIM 800; 160 MG/1; MG/1
1 TABLET ORAL 2 TIMES DAILY
Qty: 14 TABLET | Refills: 0 | Status: SHIPPED | OUTPATIENT
Start: 2020-11-27 | End: 2020-12-04

## 2020-11-27 NOTE — PROGRESS NOTES
"Subjective:       Patient ID: MARIA LUISA Green is a 70 y.o. female.    Vitals:  height is 5' 5" (1.651 m) and weight is 59.4 kg (131 lb). Her temporal temperature is 97.8 °F (36.6 °C). Her blood pressure is 136/70 and her pulse is 76. Her respiration is 12 and oxygen saturation is 98%.     Chief Complaint: Dysuria    Dysuria   This is a new problem. Episode onset: 4 days ago. The problem occurs every urination. The problem has been unchanged. The patient is experiencing no pain. She is not sexually active. There is no history of pyelonephritis. Associated symptoms include frequency and urgency. Pertinent negatives include no behavior changes, chills, discharge, flank pain, hematuria, hesitancy, nausea, possible pregnancy, sweats, vomiting, weight loss, bubble bath use, constipation, rash or withholding. She has tried nothing for the symptoms. The treatment provided no relief. Her past medical history is significant for hypertension. There is no history of catheterization, diabetes insipidus, diabetes mellitus, genitourinary reflux, kidney stones, recurrent UTIs, a single kidney, STD, urinary stasis or a urological procedure.       Constitution: Negative for chills, fatigue and fever.   HENT: Negative for congestion and sore throat.    Neck: Negative for painful lymph nodes.   Cardiovascular: Negative for chest pain and leg swelling.   Eyes: Negative for double vision and blurred vision.   Respiratory: Negative for cough and shortness of breath.    Gastrointestinal: Negative for nausea, vomiting, constipation and diarrhea.   Genitourinary: Positive for frequency and urgency. Negative for dysuria, flank pain, hematuria and history of kidney stones.   Musculoskeletal: Negative for joint pain, joint swelling, muscle cramps and muscle ache.   Skin: Negative for color change, pale, rash and bruising.   Allergic/Immunologic: Negative for seasonal allergies.   Neurological: Negative for dizziness, history of " vertigo, light-headedness, passing out and headaches.   Hematologic/Lymphatic: Negative for swollen lymph nodes.   Psychiatric/Behavioral: Negative for nervous/anxious, sleep disturbance and depression. The patient is not nervous/anxious.        Objective:      Physical Exam   Constitutional: She is oriented to person, place, and time. She appears well-developed.   HENT:   Head: Normocephalic and atraumatic.   Ears:   Right Ear: External ear normal.   Left Ear: External ear normal.   Nose: Nose normal. No nasal deformity. No epistaxis.   Eyes: Lids are normal.   Neck: Trachea normal, normal range of motion and phonation normal. Neck supple.   Cardiovascular: Normal rate, regular rhythm, normal heart sounds and normal pulses.   Pulmonary/Chest: Effort normal and breath sounds normal. No stridor. No respiratory distress. She has no decreased breath sounds. She has no wheezes. She has no rhonchi. She has no rales.   Abdominal: Soft. Normal appearance and bowel sounds are normal. She exhibits no distension. There is no abdominal tenderness. There is no rebound, no guarding, no left CVA tenderness and no right CVA tenderness.   Neurological: She is alert and oriented to person, place, and time. She has intact cranial nerves.      Comments: CN's grossly intact   Skin: Skin is warm, dry and intact. Psychiatric: Her speech is normal and behavior is normal.   Nursing note and vitals reviewed.          Results for orders placed or performed in visit on 11/27/20   POCT Urinalysis, Dipstick, Automated, W/O Scope   Result Value Ref Range    POC Blood, Urine Positive (A) Negative    POC Bilirubin, Urine Negative Negative    POC Urobilinogen, Urine normal 0.1 - 1.1    POC Ketones, Urine Negative Negative    POC Protein, Urine Negative Negative    POC Nitrates, Urine Negative Negative    POC Glucose, Urine Negative Negative    pH, UA 6.0     POC Specific Gravity, Urine 1.015 1.003 - 1.029    POC Leukocytes, Urine Positive (A)  Negative       Results reviewed with pt  Assessment:       1. Urinary frequency    2. Acute cystitis with hematuria        Plan:         Urinary frequency  -     POCT Urinalysis, Dipstick, Automated, W/O Scope  -     Culture, Urine  -     sulfamethoxazole-trimethoprim 800-160mg (BACTRIM DS) 800-160 mg Tab; Take 1 tablet by mouth 2 (two) times daily. for 7 days  Dispense: 14 tablet; Refill: 0    Acute cystitis with hematuria  -     sulfamethoxazole-trimethoprim 800-160mg (BACTRIM DS) 800-160 mg Tab; Take 1 tablet by mouth 2 (two) times daily. for 7 days  Dispense: 14 tablet; Refill: 0         Patient Instructions       Understanding Urinary Tract Infections (UTIs)  Most UTIs are caused by bacteria, although they may also be caused by viruses or fungi. Bacteria from the bowel are the most common source of infection. The infection may start because of any of the following:  · Sexual activity. During sex, bacteria can travel from the penis, vagina, or rectum into the urethra.   · Bacteria on the skin outside the rectum may travel into the urethra. This is more common in women since the rectum and urethra are closer to each other than in men. Wiping from front to back after using the toilet and keeping the area clean can help prevent germs from getting to the urethra.  · Blockage of urine flow through the urinary tract. If urine sits too long, germs may start to grow out of control.      Parts of the urinary tract  The infection can occur in any part of the urinary tract.  · The kidneys collect and store urine.  · The ureters carry urine from the kidneys to the bladder.  · The bladder holds urine until you are ready to let it out.  · The urethra carries urine from the bladder out of the body. It is shorter in women, so bacteria can move through it more easily. The urethra is longer in men, so a UTI is less likely to reach the bladder or kidneys in men.  Date Last Reviewed: 1/1/2017  © 2041-0725 [employer]  [address] [place] [address]. All rights reserved. This information is not intended as a substitute for professional medical care. Always follow your healthcare professional's instructions.      You must understand that you've received an Urgent Care treatment only and that you may be released before all your medical problems are known or treated. You, the patient, will arrange for follow up care as instructed.    Follow up with your PCP or specialty clinic as directed in the next 1-2 weeks if not improved or as needed. You can call [phone] to schedule an appointment with the appropriate provider.    If your condition worsens we recommend that you receive another evaluation at the emergency room immediately or contact your primary medical clinic's after hours call service to discuss your concerns.    Please go to the Emergency Department for any concerns or worsening of condition.

## 2020-11-27 NOTE — PATIENT INSTRUCTIONS
Understanding Urinary Tract Infections (UTIs)  Most UTIs are caused by bacteria, although they may also be caused by viruses or fungi. Bacteria from the bowel are the most common source of infection. The infection may start because of any of the following:  · Sexual activity. During sex, bacteria can travel from the penis, vagina, or rectum into the urethra.   · Bacteria on the skin outside the rectum may travel into the urethra. This is more common in women since the rectum and urethra are closer to each other than in men. Wiping from front to back after using the toilet and keeping the area clean can help prevent germs from getting to the urethra.  · Blockage of urine flow through the urinary tract. If urine sits too long, germs may start to grow out of control.      Parts of the urinary tract  The infection can occur in any part of the urinary tract.  · The kidneys collect and store urine.  · The ureters carry urine from the kidneys to the bladder.  · The bladder holds urine until you are ready to let it out.  · The urethra carries urine from the bladder out of the body. It is shorter in women, so bacteria can move through it more easily. The urethra is longer in men, so a UTI is less likely to reach the bladder or kidneys in men.  Date Last Reviewed: 1/1/2017  © 5847-6253 [employer]. [address] [place] [address], [place] [address]. All rights reserved. This information is not intended as a substitute for professional medical care. Always follow your healthcare professional's instructions.      You must understand that you've received an Urgent Care treatment only and that you may be released before all your medical problems are known or treated. You, the patient, will arrange for follow up care as instructed.    Follow up with your PCP or specialty clinic as directed in the next 1-2 weeks if not improved or as needed. You can call [phone] to schedule an appointment with the appropriate  provider.    If your condition worsens we recommend that you receive another evaluation at the emergency room immediately or contact your primary medical clinic's after hours call service to discuss your concerns.    Please go to the Emergency Department for any concerns or worsening of condition.

## 2020-12-02 ENCOUNTER — PATIENT MESSAGE (OUTPATIENT)
Dept: UROLOGY | Facility: CLINIC | Age: 70
End: 2020-12-02

## 2020-12-02 LAB
BACTERIA UR CULT: ABNORMAL
BACTERIA UR CULT: ABNORMAL
OTHER ANTIBIOTIC SUSC ISLT: ABNORMAL

## 2020-12-07 ENCOUNTER — TELEPHONE (OUTPATIENT)
Dept: URGENT CARE | Facility: CLINIC | Age: 70
End: 2020-12-07

## 2020-12-07 LAB
LEFT EYE DM RETINOPATHY: NEGATIVE
RIGHT EYE DM RETINOPATHY: NEGATIVE

## 2020-12-08 ENCOUNTER — TELEPHONE (OUTPATIENT)
Dept: FAMILY MEDICINE | Facility: CLINIC | Age: 70
End: 2020-12-08

## 2020-12-08 DIAGNOSIS — R42 VERTIGO: Primary | ICD-10-CM

## 2020-12-08 NOTE — TELEPHONE ENCOUNTER
Patient states Sunday night she started with dizziness but went away and yesterday she was walking her dog and she started getting dizzy she made it home sat on the couch and got nauseated and very dizzy. Patient went to the ER yesterday and they gave her meclizine and Zofran. They recommend for the patient to see an ENT and wanted some recommendations from Dr. Calixto.

## 2020-12-08 NOTE — TELEPHONE ENCOUNTER
Referral in for ENT as requested.  Recommend to take the meclizine 3x/day as needed.  Also recommend pt look up Epley maneuvers online and try them at home.

## 2020-12-08 NOTE — TELEPHONE ENCOUNTER
----- Message from Pablo Meyer sent at 12/8/2020  8:30 AM CST -----  Pt would like to be called back asap regarding questions concerning her current condition(Dizziness).    Pt can be reached at [phone].    Thanks

## 2020-12-08 NOTE — TELEPHONE ENCOUNTER
I left a message for the patient to return my call for urine culture results.    ----- Message from Sabrina Valerio PA-C sent at 12/2/2020 12:21 PM CST -----  Please advise pt that urine culture was positive for growth.  Pt was treated appropriately with Bactrim at the last visit.

## 2020-12-09 ENCOUNTER — OFFICE VISIT (OUTPATIENT)
Dept: URGENT CARE | Facility: CLINIC | Age: 70
End: 2020-12-09
Payer: MEDICARE

## 2020-12-09 ENCOUNTER — PATIENT MESSAGE (OUTPATIENT)
Dept: UROLOGY | Facility: CLINIC | Age: 70
End: 2020-12-09

## 2020-12-09 VITALS
HEIGHT: 65 IN | DIASTOLIC BLOOD PRESSURE: 80 MMHG | WEIGHT: 131 LBS | RESPIRATION RATE: 16 BRPM | SYSTOLIC BLOOD PRESSURE: 149 MMHG | BODY MASS INDEX: 21.83 KG/M2 | OXYGEN SATURATION: 98 % | HEART RATE: 77 BPM | TEMPERATURE: 98 F

## 2020-12-09 DIAGNOSIS — R03.0 ELEVATED BLOOD PRESSURE READING: ICD-10-CM

## 2020-12-09 DIAGNOSIS — R82.998 LEUKOCYTES IN URINE: Primary | ICD-10-CM

## 2020-12-09 LAB
BILIRUB UR QL STRIP: NEGATIVE
GLUCOSE UR QL STRIP: NEGATIVE
KETONES UR QL STRIP: NEGATIVE
LEUKOCYTE ESTERASE UR QL STRIP: POSITIVE
PH, POC UA: 6.5
POC BLOOD, URINE: POSITIVE
POC NITRATES, URINE: NEGATIVE
PROT UR QL STRIP: NEGATIVE
SP GR UR STRIP: 1 (ref 1–1.03)
UROBILINOGEN UR STRIP-ACNC: NORMAL (ref 0.1–1.1)

## 2020-12-09 PROCEDURE — 3008F PR BODY MASS INDEX (BMI) DOCUMENTED: ICD-10-PCS | Mod: CPTII,S$GLB,, | Performed by: NURSE PRACTITIONER

## 2020-12-09 PROCEDURE — 81003 URINALYSIS AUTO W/O SCOPE: CPT | Mod: QW,S$GLB,, | Performed by: NURSE PRACTITIONER

## 2020-12-09 PROCEDURE — 99214 PR OFFICE/OUTPT VISIT, EST, LEVL IV, 30-39 MIN: ICD-10-PCS | Mod: 25,S$GLB,, | Performed by: NURSE PRACTITIONER

## 2020-12-09 PROCEDURE — 99214 OFFICE O/P EST MOD 30 MIN: CPT | Mod: 25,S$GLB,, | Performed by: NURSE PRACTITIONER

## 2020-12-09 PROCEDURE — 3008F BODY MASS INDEX DOCD: CPT | Mod: CPTII,S$GLB,, | Performed by: NURSE PRACTITIONER

## 2020-12-09 PROCEDURE — 81003 POCT URINALYSIS, DIPSTICK, AUTOMATED, W/O SCOPE: ICD-10-PCS | Mod: QW,S$GLB,, | Performed by: NURSE PRACTITIONER

## 2020-12-09 RX ORDER — CIPROFLOXACIN 500 MG/1
500 TABLET ORAL 2 TIMES DAILY
Qty: 10 TABLET | Refills: 0 | Status: SHIPPED | OUTPATIENT
Start: 2020-12-09 | End: 2020-12-14

## 2020-12-09 NOTE — PROGRESS NOTES
"Subjective:       Patient ID: MARIA LUISA Green is a 70 y.o. female.    Vitals:  height is 5' 5" (1.651 m) and weight is 59.4 kg (131 lb). Her temporal temperature is 97.8 °F (36.6 °C). Her blood pressure is 149/80 (abnormal) and her pulse is 77. Her respiration is 16 and oxygen saturation is 98%.     Chief Complaint: Urinary Frequency    Patient was seen here for UTI and was given bactrim. She finished antibiotics 12/04/20. She noticed some dull right sided back pain yesterday but it has resolved. This morning she started having urinary frequency. She stated that she has voided atleast 5 times today.    Urinary Frequency   This is a new problem. The current episode started today. The problem has been unchanged. Quality: dull  The pain is at a severity of 4/10 (no pain right now - right side back pain). The pain is mild. There has been no fever. Associated symptoms include frequency and urgency. Pertinent negatives include no chills, hematuria, nausea, vomiting or rash. She has tried nothing for the symptoms.       Constitution: Negative for chills and fever.   Neck: Negative for painful lymph nodes.   Gastrointestinal: Negative for abdominal pain, nausea and vomiting.   Genitourinary: Positive for frequency and urgency. Negative for dysuria, urine decreased, hematuria, history of kidney stones, painful menstruation, irregular menstruation, missed menses, heavy menstrual bleeding, ovarian cysts, genital trauma, vaginal pain, vaginal discharge, vaginal bleeding, vaginal odor, painful intercourse, genital sore, painful ejaculation and pelvic pain.        Right side back pain   Musculoskeletal: Negative for back pain.   Skin: Negative for rash and lesion.   Hematologic/Lymphatic: Negative for swollen lymph nodes.       Objective:      Physical Exam   Constitutional: She is oriented to person, place, and time. She appears well-developed.   HENT:   Head: Normocephalic and atraumatic.   Ears:   Right Ear: External " ear normal.   Left Ear: External ear normal.   Nose: Nose normal.   Eyes: Conjunctivae and lids are normal.   Neck: Trachea normal and full passive range of motion without pain. Neck supple.   Cardiovascular: Normal rate, regular rhythm and normal heart sounds.   Pulmonary/Chest: Effort normal and breath sounds normal. No respiratory distress.   Abdominal: Soft. Normal appearance and bowel sounds are normal. She exhibits no distension, no abdominal bruit, no pulsatile midline mass and no mass. There is no splenomegaly or hepatomegaly. There is no abdominal tenderness. There is no rigidity, no rebound, no guarding, no tenderness at McBurney's point and negative Cooper's sign.   Musculoskeletal: Normal range of motion.   Neurological: She is alert and oriented to person, place, and time. She has normal strength. She is not disoriented.   Skin: Skin is warm, dry, intact, not diaphoretic and not pale. Psychiatric: Her speech is normal and behavior is normal. Judgment and thought content normal.   Nursing note and vitals reviewed.        Assessment:       1. Leukocytes in urine    2. Elevated blood pressure reading        Plan:     discussed risks of Floroquinolones, including tendon rupture.  Patient aware to follow up with PCP if she has any complications.  Verbalized understanding.     Results for orders placed or performed in visit on 12/09/20   POCT Urinalysis, Dipstick, Automated, W/O Scope   Result Value Ref Range    POC Blood, Urine Positive (A) Negative    POC Bilirubin, Urine Negative Negative    POC Urobilinogen, Urine normal 0.1 - 1.1    POC Ketones, Urine Negative Negative    POC Protein, Urine Negative Negative    POC Nitrates, Urine Negative Negative    POC Glucose, Urine Negative Negative    pH, UA 6.5     POC Specific Gravity, Urine 1.005 1.003 - 1.029    POC Leukocytes, Urine Positive (A) Negative       Leukocytes in urine  -     POCT Urinalysis, Dipstick, Automated, W/O Scope  -     Urine culture  -      ciprofloxacin HCl (CIPRO) 500 MG tablet; Take 1 tablet (500 mg total) by mouth 2 (two) times daily. for 5 days  Dispense: 10 tablet; Refill: 0    Elevated blood pressure reading      Patient Instructions     Please follow up with your Primary care provider within 2-5 days if your signs and symptoms have not resolved or worsen.     If your condition worsens or fails to improve we recommend that you receive another evaluation at the emergency room immediately or contact your primary medical clinic to discuss your concerns.    You must understand that you have received an Urgent Care treatment only and that you may be released before all of your medical problems are known or treated.   You, the patient, will arrange for follow up care as instructed.       Urinary Tract Infection    If your condition worsens or fails to improve we recommend that you receive another evaluation at the emergency room immediately or contact your primary medical clinic to discuss your concerns.    You must understand that you have received an Urgent Care treatment only and that you may be released before all of your medical problems are known or treated.     You, the patient, will arrange for follow up care as instructed.   You have been given an antibiotic to treat your condition today.  Please complete the antibiotic as directed on the bottle.     If you are female and on BCP use additional methods to prevent pregnancy while on antibiotics and for one cycle after.     You may take AZO for discomfort as directed on box. Take after a meal.  Use NO MORE than 2 full days as this can mask worsening symptoms.      If a culture was sent off:  We should receive results within 4 business days of culture.  If you are not contacted by the afternoon of the 4th day, please contact us for your results.        Dysuria     Painful urination (dysuria) is often caused by a problem in the urinary tract.   Dysuria is pain felt during urination. It is often  "described as a burning. Learn more about this problem and how it can be treated.  What causes dysuria?  Possible causes include:  · Infection with a bacteria or virus such as a urinary tract infection (UTI or a sexually transmitted infection (STI)  · Sensitivity or allergy to chemicals such as those found in lotions and other products  · Prostate or bladder problems  · Radiation therapy to the pelvic area  How is dysuria diagnosed?  Your healthcare provider will examine you. He or she will ask about your symptoms and health. After talking with you and doing a physical exam, your healthcare provider may know what is causing your dysuria. He or she will usually request  a sample of your urine. Tests of your urine, or a "urinalysis," are done. A urinalysis may include:  · Looking at the urine sample (visual exam)  · Checking for substances (chemical exam)  · Looking at a small amount under a microscope (microscopic exam)  Some parts of the urinalysis may be done in the provider's office and some in a lab. And, the urine sample may be checked for bacteria and yeast (urine culture). Your healthcare provider will tell you more about these tests if they are needed.  How is dysuria treated?  Treatment depends on the cause. If you have a bacterial infection, you may need antibiotics. You may be given medicines to make it easier for you to urinate and help relieve pain. Your healthcare provider can tell you more about your treatment options. Untreated, symptoms may get worse.  When to call your healthcare provider  Call the healthcare provider right away if you have any of the following:  · Fever of 100.4°F (38°C) or higher   · No improvement after three days of treatment  · Trouble urinating because of pain  · New or increased discharge from the vagina or penis  · Rash or joint pain  · Increased back or abdominal pain  · Enlarged painful lymph nodes (lumps) in the groin   Date Last Reviewed: 1/1/2017  © 2164-2805 The " [employer]. [address] [place] [address], [place] [address]. All rights reserved. This information is not intended as a substitute for professional medical care. Always follow your healthcare professional's instructions.

## 2020-12-09 NOTE — PATIENT INSTRUCTIONS
Please follow up with your Primary care provider within 2-5 days if your signs and symptoms have not resolved or worsen.     If your condition worsens or fails to improve we recommend that you receive another evaluation at the emergency room immediately or contact your primary medical clinic to discuss your concerns.    You must understand that you have received an Urgent Care treatment only and that you may be released before all of your medical problems are known or treated.   You, the patient, will arrange for follow up care as instructed.       Urinary Tract Infection    If your condition worsens or fails to improve we recommend that you receive another evaluation at the emergency room immediately or contact your primary medical clinic to discuss your concerns.    You must understand that you have received an Urgent Care treatment only and that you may be released before all of your medical problems are known or treated.     You, the patient, will arrange for follow up care as instructed.   You have been given an antibiotic to treat your condition today.  Please complete the antibiotic as directed on the bottle.     If you are female and on BCP use additional methods to prevent pregnancy while on antibiotics and for one cycle after.     You may take AZO for discomfort as directed on box. Take after a meal.  Use NO MORE than 2 full days as this can mask worsening symptoms.      If a culture was sent off:  We should receive results within 4 business days of culture.  If you are not contacted by the afternoon of the 4th day, please contact us for your results.        Dysuria     Painful urination (dysuria) is often caused by a problem in the urinary tract.   Dysuria is pain felt during urination. It is often described as a burning. Learn more about this problem and how it can be treated.  What causes dysuria?  Possible causes include:  · Infection with a bacteria or virus such as a urinary tract infection (UTI or a  "sexually transmitted infection (STI)  · Sensitivity or allergy to chemicals such as those found in lotions and other products  · Prostate or bladder problems  · Radiation therapy to the pelvic area  How is dysuria diagnosed?  Your healthcare provider will examine you. He or she will ask about your symptoms and health. After talking with you and doing a physical exam, your healthcare provider may know what is causing your dysuria. He or she will usually request  a sample of your urine. Tests of your urine, or a "urinalysis," are done. A urinalysis may include:  · Looking at the urine sample (visual exam)  · Checking for substances (chemical exam)  · Looking at a small amount under a microscope (microscopic exam)  Some parts of the urinalysis may be done in the provider's office and some in a lab. And, the urine sample may be checked for bacteria and yeast (urine culture). Your healthcare provider will tell you more about these tests if they are needed.  How is dysuria treated?  Treatment depends on the cause. If you have a bacterial infection, you may need antibiotics. You may be given medicines to make it easier for you to urinate and help relieve pain. Your healthcare provider can tell you more about your treatment options. Untreated, symptoms may get worse.  When to call your healthcare provider  Call the healthcare provider right away if you have any of the following:  · Fever of 100.4°F (38°C) or higher   · No improvement after three days of treatment  · Trouble urinating because of pain  · New or increased discharge from the vagina or penis  · Rash or joint pain  · Increased back or abdominal pain  · Enlarged painful lymph nodes (lumps) in the groin   Date Last Reviewed: 1/1/2017 © 2000-2017 [employer]. [address] [place] [address], [place] [address]. All rights reserved. This information is not intended as a substitute for professional medical care. Always follow your healthcare professional's " instructions.

## 2020-12-13 LAB
BACTERIA UR CULT: NO GROWTH
BACTERIA UR CULT: NORMAL

## 2020-12-14 ENCOUNTER — TELEPHONE (OUTPATIENT)
Dept: URGENT CARE | Facility: CLINIC | Age: 70
End: 2020-12-14

## 2020-12-15 NOTE — TELEPHONE ENCOUNTER
Call back - urine culture - Spoke with patient and gave her culture result. She said she is doing good since her visit.

## 2020-12-17 ENCOUNTER — PATIENT OUTREACH (OUTPATIENT)
Dept: ADMINISTRATIVE | Facility: HOSPITAL | Age: 70
End: 2020-12-17

## 2020-12-17 ENCOUNTER — TELEPHONE (OUTPATIENT)
Dept: ADMINISTRATIVE | Facility: HOSPITAL | Age: 70
End: 2020-12-17

## 2020-12-18 ENCOUNTER — PATIENT OUTREACH (OUTPATIENT)
Dept: ADMINISTRATIVE | Facility: OTHER | Age: 70
End: 2020-12-18

## 2020-12-18 NOTE — PROGRESS NOTES
Health Maintenance Due   Topic Date Due    Hepatitis C Screening  1950    TETANUS VACCINE  08/02/1968    Shingles Vaccine (1 of 2) 08/02/2000    DEXA SCAN  04/07/2017    Mammogram  11/04/2020     Updates were requested from care everywhere.  Chart was reviewed for overdue Proactive [facility] Encounters ([facility]) topics (CRS, Breast Cancer Screening, Eye exam)  Health Maintenance has been updated.  LINKS immunization registry triggered.  Immunizations were reconciled.

## 2020-12-23 ENCOUNTER — OFFICE VISIT (OUTPATIENT)
Dept: UROLOGY | Facility: CLINIC | Age: 70
End: 2020-12-23
Payer: MEDICARE

## 2020-12-23 VITALS
HEIGHT: 65 IN | WEIGHT: 131 LBS | TEMPERATURE: 99 F | SYSTOLIC BLOOD PRESSURE: 130 MMHG | RESPIRATION RATE: 18 BRPM | HEART RATE: 74 BPM | DIASTOLIC BLOOD PRESSURE: 66 MMHG | OXYGEN SATURATION: 98 % | BODY MASS INDEX: 21.83 KG/M2

## 2020-12-23 DIAGNOSIS — N39.3 SUI (STRESS URINARY INCONTINENCE, FEMALE): ICD-10-CM

## 2020-12-23 DIAGNOSIS — N30.01 ACUTE CYSTITIS WITH HEMATURIA: Primary | ICD-10-CM

## 2020-12-23 DIAGNOSIS — Z09 FOLLOW-UP EXAM AFTER TREATMENT: ICD-10-CM

## 2020-12-23 LAB
BILIRUB SERPL-MCNC: NORMAL MG/DL
BLOOD URINE, POC: NORMAL
CLARITY, POC UA: CLEAR
COLOR, POC UA: YELLOW
GLUCOSE UR QL STRIP: NORMAL
KETONES UR QL STRIP: NORMAL
LEUKOCYTE ESTERASE URINE, POC: NORMAL
NITRITE, POC UA: NORMAL
PH, POC UA: 5
PROTEIN, POC: NORMAL
SPECIFIC GRAVITY, POC UA: 1.02
UROBILINOGEN, POC UA: 0.2

## 2020-12-23 PROCEDURE — 1101F PT FALLS ASSESS-DOCD LE1/YR: CPT | Mod: CPTII,S$GLB,, | Performed by: NURSE PRACTITIONER

## 2020-12-23 PROCEDURE — 99999 PR PBB SHADOW E&M-EST. PATIENT-LVL V: ICD-10-PCS | Mod: PBBFAC,,, | Performed by: NURSE PRACTITIONER

## 2020-12-23 PROCEDURE — 1159F MED LIST DOCD IN RCRD: CPT | Mod: S$GLB,,, | Performed by: NURSE PRACTITIONER

## 2020-12-23 PROCEDURE — 81002 URINALYSIS NONAUTO W/O SCOPE: CPT | Mod: S$GLB,,, | Performed by: NURSE PRACTITIONER

## 2020-12-23 PROCEDURE — 3008F BODY MASS INDEX DOCD: CPT | Mod: CPTII,S$GLB,, | Performed by: NURSE PRACTITIONER

## 2020-12-23 PROCEDURE — 99213 OFFICE O/P EST LOW 20 MIN: CPT | Mod: 25,S$GLB,, | Performed by: NURSE PRACTITIONER

## 2020-12-23 PROCEDURE — 1101F PR PT FALLS ASSESS DOC 0-1 FALLS W/OUT INJ PAST YR: ICD-10-PCS | Mod: CPTII,S$GLB,, | Performed by: NURSE PRACTITIONER

## 2020-12-23 PROCEDURE — 1126F AMNT PAIN NOTED NONE PRSNT: CPT | Mod: S$GLB,,, | Performed by: NURSE PRACTITIONER

## 2020-12-23 PROCEDURE — 99213 PR OFFICE/OUTPT VISIT, EST, LEVL III, 20-29 MIN: ICD-10-PCS | Mod: 25,S$GLB,, | Performed by: NURSE PRACTITIONER

## 2020-12-23 PROCEDURE — 3288F FALL RISK ASSESSMENT DOCD: CPT | Mod: CPTII,S$GLB,, | Performed by: NURSE PRACTITIONER

## 2020-12-23 PROCEDURE — 3008F PR BODY MASS INDEX (BMI) DOCUMENTED: ICD-10-PCS | Mod: CPTII,S$GLB,, | Performed by: NURSE PRACTITIONER

## 2020-12-23 PROCEDURE — 1126F PR PAIN SEVERITY QUANTIFIED, NO PAIN PRESENT: ICD-10-PCS | Mod: S$GLB,,, | Performed by: NURSE PRACTITIONER

## 2020-12-23 PROCEDURE — 99999 PR PBB SHADOW E&M-EST. PATIENT-LVL V: CPT | Mod: PBBFAC,,, | Performed by: NURSE PRACTITIONER

## 2020-12-23 PROCEDURE — 3288F PR FALLS RISK ASSESSMENT DOCUMENTED: ICD-10-PCS | Mod: CPTII,S$GLB,, | Performed by: NURSE PRACTITIONER

## 2020-12-23 PROCEDURE — 1159F PR MEDICATION LIST DOCUMENTED IN MEDICAL RECORD: ICD-10-PCS | Mod: S$GLB,,, | Performed by: NURSE PRACTITIONER

## 2020-12-23 PROCEDURE — 81002 POCT URINE DIPSTICK WITHOUT MICROSCOPE: ICD-10-PCS | Mod: S$GLB,,, | Performed by: NURSE PRACTITIONER

## 2020-12-23 NOTE — PROGRESS NOTES
Subjective:       Patient ID: MARIA LUISA Green is a 70 y.o. female.    Chief Complaint: Urinary Tract Infection (urgent care follow up recurrent uti)    Patient is here today for an urgent care f/u for UTI. She was treated by urgent care x2 (Bactrim DS and Cipro) for her urinary symptoms. She reports her symptoms have greatly improved. Urine cx was repeated on 12/9/2020 and came back normal. Patient has a hx of TANIA and wears a pad for leakage. She changes pad 1-2 times daily with only scant amount of urine noted on pad. Patient reports her TANIA is manageable at this time.     Urinary Tract Infection   This is a new problem. Episode onset: 11/27/2020. The problem has been resolved. The pain is at a severity of 0/10. The patient is experiencing no pain. There has been no fever. There is no history of pyelonephritis. Pertinent negatives include no behavior changes, chills, discharge, flank pain, frequency, hematuria, hesitancy, nausea, possible pregnancy, sweats, urgency, vomiting, weight loss, bubble bath use, constipation, rash or withholding. She has tried antibiotics (Bactrim DS and Cipro) for the symptoms. The treatment provided significant relief. Her past medical history is significant for diabetes mellitus. There is no history of recurrent UTIs, a single kidney or a urological procedure.     Review of Systems   Constitutional: Positive for fatigue. Negative for appetite change, chills, fever and weight loss.   Gastrointestinal: Negative for change in bowel habit, constipation, diarrhea, nausea, vomiting and change in bowel habit.   Genitourinary: Positive for bladder incontinence (coughing, sneezing, and laughing) and nocturia (x1). Negative for decreased urine volume, difficulty urinating, dysuria, flank pain, frequency, hematuria, hesitancy, pelvic pain, urgency, vaginal bleeding, vaginal pain and vaginal dryness.   Integumentary:  Negative for rash.   Neurological: Positive for vertigo and  light-headedness. Negative for dizziness and weakness.   Psychiatric/Behavioral: Negative.          Objective:      Physical Exam  Vitals signs and nursing note reviewed.   Constitutional:       General: She is not in acute distress.     Appearance: She is well-developed. She is not ill-appearing.   HENT:      Head: Normocephalic and atraumatic.   Eyes:      Pupils: Pupils are equal, round, and reactive to light.   Neck:      Musculoskeletal: Normal range of motion.   Cardiovascular:      Rate and Rhythm: Normal rate.   Pulmonary:      Effort: Pulmonary effort is normal. No respiratory distress.   Abdominal:      Palpations: Abdomen is soft.      Tenderness: There is no abdominal tenderness.   Musculoskeletal: Normal range of motion.   Skin:     General: Skin is warm and dry.   Neurological:      Mental Status: She is alert and oriented to person, place, and time.      Coordination: Coordination normal.   Psychiatric:         Mood and Affect: Mood normal.         Behavior: Behavior normal.         Thought Content: Thought content normal.         Judgment: Judgment normal.         Assessment:       1. Acute cystitis with hematuria    2. Follow-up exam after treatment    3. TANIA (stress urinary incontinence, female)        Plan:       MARIA LUISA NEVAREZ was seen today for urinary tract infection.    Diagnoses and all orders for this visit:    Acute cystitis with hematuria  -     POCT URINE DIPSTICK WITHOUT MICROSCOPE    Follow-up exam after treatment  -     POCT URINE DIPSTICK WITHOUT MICROSCOPE    TANIA (stress urinary incontinence, female)    Follow-up as needed.    Noe Guevara NP

## 2021-01-14 ENCOUNTER — PATIENT MESSAGE (OUTPATIENT)
Dept: FAMILY MEDICINE | Facility: CLINIC | Age: 71
End: 2021-01-14

## 2021-01-14 DIAGNOSIS — E78.2 HYPERLIPIDEMIA, MIXED: ICD-10-CM

## 2021-01-14 RX ORDER — ATORVASTATIN CALCIUM 40 MG/1
40 TABLET, FILM COATED ORAL DAILY
Qty: 90 TABLET | Refills: 3 | Status: SHIPPED | OUTPATIENT
Start: 2021-01-14 | End: 2021-12-27

## 2021-01-21 PROBLEM — R26.81 UNSTEADY GAIT: Status: ACTIVE | Noted: 2021-01-21

## 2021-01-21 PROBLEM — R42 VERTIGO: Status: ACTIVE | Noted: 2021-01-21

## 2021-01-28 ENCOUNTER — PATIENT MESSAGE (OUTPATIENT)
Dept: FAMILY MEDICINE | Facility: CLINIC | Age: 71
End: 2021-01-28

## 2021-01-28 DIAGNOSIS — F33.0 MILD RECURRENT MAJOR DEPRESSION: ICD-10-CM

## 2021-01-28 RX ORDER — VENLAFAXINE HYDROCHLORIDE 75 MG/1
75 CAPSULE, EXTENDED RELEASE ORAL DAILY
Qty: 90 CAPSULE | Refills: 3 | Status: CANCELLED | OUTPATIENT
Start: 2021-01-28

## 2021-01-28 RX ORDER — VENLAFAXINE HYDROCHLORIDE 75 MG/1
75 CAPSULE, EXTENDED RELEASE ORAL DAILY
Qty: 90 CAPSULE | Refills: 3 | Status: SHIPPED | OUTPATIENT
Start: 2021-01-28 | End: 2021-12-30

## 2021-02-13 ENCOUNTER — IMMUNIZATION (OUTPATIENT)
Dept: FAMILY MEDICINE | Facility: CLINIC | Age: 71
End: 2021-02-13
Payer: MEDICARE

## 2021-02-13 DIAGNOSIS — Z23 NEED FOR VACCINATION: Primary | ICD-10-CM

## 2021-02-13 PROCEDURE — 91300 COVID-19, MRNA, LNP-S, PF, 30 MCG/0.3 ML DOSE VACCINE: CPT | Mod: PBBFAC,PN | Performed by: NURSE ANESTHETIST, CERTIFIED REGISTERED

## 2021-03-06 ENCOUNTER — IMMUNIZATION (OUTPATIENT)
Dept: FAMILY MEDICINE | Facility: CLINIC | Age: 71
End: 2021-03-06
Payer: MEDICARE

## 2021-03-06 DIAGNOSIS — Z23 NEED FOR VACCINATION: Primary | ICD-10-CM

## 2021-03-06 PROCEDURE — 0002A COVID-19, MRNA, LNP-S, PF, 30 MCG/0.3 ML DOSE VACCINE: CPT | Mod: PBBFAC | Performed by: FAMILY MEDICINE

## 2021-03-06 PROCEDURE — 91300 COVID-19, MRNA, LNP-S, PF, 30 MCG/0.3 ML DOSE VACCINE: CPT | Mod: PBBFAC | Performed by: FAMILY MEDICINE

## 2021-03-29 ENCOUNTER — PATIENT OUTREACH (OUTPATIENT)
Dept: ADMINISTRATIVE | Facility: OTHER | Age: 71
End: 2021-03-29

## 2021-03-29 ENCOUNTER — PATIENT MESSAGE (OUTPATIENT)
Dept: OBSTETRICS AND GYNECOLOGY | Facility: CLINIC | Age: 71
End: 2021-03-29

## 2021-03-30 ENCOUNTER — OFFICE VISIT (OUTPATIENT)
Dept: PODIATRY | Facility: CLINIC | Age: 71
End: 2021-03-30
Payer: MEDICARE

## 2021-03-30 DIAGNOSIS — M20.41 HAMMER TOES OF BOTH FEET: ICD-10-CM

## 2021-03-30 DIAGNOSIS — B35.1 ONYCHOMYCOSIS DUE TO DERMATOPHYTE: ICD-10-CM

## 2021-03-30 DIAGNOSIS — E11.42 DIABETIC POLYNEUROPATHY ASSOCIATED WITH TYPE 2 DIABETES MELLITUS: Primary | ICD-10-CM

## 2021-03-30 DIAGNOSIS — M20.42 HAMMER TOES OF BOTH FEET: ICD-10-CM

## 2021-03-30 DIAGNOSIS — L84 CORN OR CALLUS: ICD-10-CM

## 2021-03-30 DIAGNOSIS — Q66.70 PES CAVUS: ICD-10-CM

## 2021-03-30 PROCEDURE — 99213 OFFICE O/P EST LOW 20 MIN: CPT | Mod: 25,S$GLB,, | Performed by: PODIATRIST

## 2021-03-30 PROCEDURE — 11721 PR DEBRIDEMENT OF NAILS, 6 OR MORE: ICD-10-PCS | Mod: Q9,S$GLB,, | Performed by: PODIATRIST

## 2021-03-30 PROCEDURE — 99213 PR OFFICE/OUTPT VISIT, EST, LEVL III, 20-29 MIN: ICD-10-PCS | Mod: 25,S$GLB,, | Performed by: PODIATRIST

## 2021-03-30 PROCEDURE — 1101F PT FALLS ASSESS-DOCD LE1/YR: CPT | Mod: CPTII,S$GLB,, | Performed by: PODIATRIST

## 2021-03-30 PROCEDURE — 1159F PR MEDICATION LIST DOCUMENTED IN MEDICAL RECORD: ICD-10-PCS | Mod: S$GLB,,, | Performed by: PODIATRIST

## 2021-03-30 PROCEDURE — 3044F PR MOST RECENT HEMOGLOBIN A1C LEVEL <7.0%: ICD-10-PCS | Mod: CPTII,S$GLB,, | Performed by: PODIATRIST

## 2021-03-30 PROCEDURE — 3288F FALL RISK ASSESSMENT DOCD: CPT | Mod: CPTII,S$GLB,, | Performed by: PODIATRIST

## 2021-03-30 PROCEDURE — 99999 PR PBB SHADOW E&M-EST. PATIENT-LVL III: ICD-10-PCS | Mod: PBBFAC,,, | Performed by: PODIATRIST

## 2021-03-30 PROCEDURE — 1125F PR PAIN SEVERITY QUANTIFIED, PAIN PRESENT: ICD-10-PCS | Mod: S$GLB,,, | Performed by: PODIATRIST

## 2021-03-30 PROCEDURE — 1101F PR PT FALLS ASSESS DOC 0-1 FALLS W/OUT INJ PAST YR: ICD-10-PCS | Mod: CPTII,S$GLB,, | Performed by: PODIATRIST

## 2021-03-30 PROCEDURE — 11721 DEBRIDE NAIL 6 OR MORE: CPT | Mod: Q9,S$GLB,, | Performed by: PODIATRIST

## 2021-03-30 PROCEDURE — 3288F PR FALLS RISK ASSESSMENT DOCUMENTED: ICD-10-PCS | Mod: CPTII,S$GLB,, | Performed by: PODIATRIST

## 2021-03-30 PROCEDURE — 1125F AMNT PAIN NOTED PAIN PRSNT: CPT | Mod: S$GLB,,, | Performed by: PODIATRIST

## 2021-03-30 PROCEDURE — 99999 PR PBB SHADOW E&M-EST. PATIENT-LVL III: CPT | Mod: PBBFAC,,, | Performed by: PODIATRIST

## 2021-03-30 PROCEDURE — 3044F HG A1C LEVEL LT 7.0%: CPT | Mod: CPTII,S$GLB,, | Performed by: PODIATRIST

## 2021-03-30 PROCEDURE — 1159F MED LIST DOCD IN RCRD: CPT | Mod: S$GLB,,, | Performed by: PODIATRIST

## 2021-04-15 ENCOUNTER — TELEPHONE (OUTPATIENT)
Dept: FAMILY MEDICINE | Facility: CLINIC | Age: 71
End: 2021-04-15

## 2021-04-15 DIAGNOSIS — E11.42 TYPE 2 DIABETES MELLITUS WITH DIABETIC POLYNEUROPATHY, WITHOUT LONG-TERM CURRENT USE OF INSULIN: Primary | ICD-10-CM

## 2021-04-15 DIAGNOSIS — E78.2 HYPERLIPIDEMIA, MIXED: ICD-10-CM

## 2021-04-16 ENCOUNTER — PATIENT MESSAGE (OUTPATIENT)
Dept: PRIMARY CARE CLINIC | Facility: CLINIC | Age: 71
End: 2021-04-16

## 2021-04-27 ENCOUNTER — OFFICE VISIT (OUTPATIENT)
Dept: FAMILY MEDICINE | Facility: CLINIC | Age: 71
End: 2021-04-27
Payer: COMMERCIAL

## 2021-04-27 VITALS
BODY MASS INDEX: 21.79 KG/M2 | DIASTOLIC BLOOD PRESSURE: 60 MMHG | TEMPERATURE: 98 F | OXYGEN SATURATION: 98 % | SYSTOLIC BLOOD PRESSURE: 116 MMHG | HEIGHT: 65 IN | HEART RATE: 79 BPM | WEIGHT: 130.75 LBS

## 2021-04-27 DIAGNOSIS — G89.29 CHRONIC NECK PAIN: ICD-10-CM

## 2021-04-27 DIAGNOSIS — E11.42 TYPE 2 DIABETES MELLITUS WITH DIABETIC POLYNEUROPATHY, WITHOUT LONG-TERM CURRENT USE OF INSULIN: Primary | ICD-10-CM

## 2021-04-27 DIAGNOSIS — L30.9 ECZEMA, UNSPECIFIED TYPE: ICD-10-CM

## 2021-04-27 DIAGNOSIS — E78.2 HYPERLIPIDEMIA, MIXED: ICD-10-CM

## 2021-04-27 DIAGNOSIS — K90.0 CELIAC DISEASE: ICD-10-CM

## 2021-04-27 DIAGNOSIS — F51.01 PRIMARY INSOMNIA: ICD-10-CM

## 2021-04-27 DIAGNOSIS — K21.9 GERD WITHOUT ESOPHAGITIS: ICD-10-CM

## 2021-04-27 DIAGNOSIS — H81.03 MENIERE'S DISEASE OF BOTH EARS: ICD-10-CM

## 2021-04-27 DIAGNOSIS — M54.2 CHRONIC NECK PAIN: ICD-10-CM

## 2021-04-27 DIAGNOSIS — G25.0 BENIGN ESSENTIAL TREMOR: ICD-10-CM

## 2021-04-27 DIAGNOSIS — F33.0 MILD RECURRENT MAJOR DEPRESSION: ICD-10-CM

## 2021-04-27 PROCEDURE — 3044F PR MOST RECENT HEMOGLOBIN A1C LEVEL <7.0%: ICD-10-PCS | Mod: CPTII,S$GLB,, | Performed by: INTERNAL MEDICINE

## 2021-04-27 PROCEDURE — 1159F PR MEDICATION LIST DOCUMENTED IN MEDICAL RECORD: ICD-10-PCS | Mod: S$GLB,,, | Performed by: INTERNAL MEDICINE

## 2021-04-27 PROCEDURE — 1126F PR PAIN SEVERITY QUANTIFIED, NO PAIN PRESENT: ICD-10-PCS | Mod: S$GLB,,, | Performed by: INTERNAL MEDICINE

## 2021-04-27 PROCEDURE — 1159F MED LIST DOCD IN RCRD: CPT | Mod: S$GLB,,, | Performed by: INTERNAL MEDICINE

## 2021-04-27 PROCEDURE — 3044F HG A1C LEVEL LT 7.0%: CPT | Mod: CPTII,S$GLB,, | Performed by: INTERNAL MEDICINE

## 2021-04-27 PROCEDURE — 99214 PR OFFICE/OUTPT VISIT, EST, LEVL IV, 30-39 MIN: ICD-10-PCS | Mod: S$GLB,,, | Performed by: INTERNAL MEDICINE

## 2021-04-27 PROCEDURE — 1126F AMNT PAIN NOTED NONE PRSNT: CPT | Mod: S$GLB,,, | Performed by: INTERNAL MEDICINE

## 2021-04-27 PROCEDURE — 3008F BODY MASS INDEX DOCD: CPT | Mod: CPTII,S$GLB,, | Performed by: INTERNAL MEDICINE

## 2021-04-27 PROCEDURE — 3008F PR BODY MASS INDEX (BMI) DOCUMENTED: ICD-10-PCS | Mod: CPTII,S$GLB,, | Performed by: INTERNAL MEDICINE

## 2021-04-27 PROCEDURE — 99999 PR PBB SHADOW E&M-EST. PATIENT-LVL V: CPT | Mod: PBBFAC,,, | Performed by: INTERNAL MEDICINE

## 2021-04-27 PROCEDURE — 99214 OFFICE O/P EST MOD 30 MIN: CPT | Mod: S$GLB,,, | Performed by: INTERNAL MEDICINE

## 2021-04-27 PROCEDURE — 99999 PR PBB SHADOW E&M-EST. PATIENT-LVL V: ICD-10-PCS | Mod: PBBFAC,,, | Performed by: INTERNAL MEDICINE

## 2021-04-27 RX ORDER — TRIAMTERENE AND HYDROCHLOROTHIAZIDE 37.5; 25 MG/1; MG/1
1 CAPSULE ORAL EVERY MORNING
COMMUNITY
End: 2021-08-19

## 2021-04-27 RX ORDER — TRIAMTERENE AND HYDROCHLOROTHIAZIDE 37.5; 25 MG/1; MG/1
CAPSULE ORAL
COMMUNITY
Start: 2021-04-17 | End: 2021-04-27

## 2021-04-27 RX ORDER — TRIAMCINOLONE ACETONIDE 1 MG/G
OINTMENT TOPICAL 2 TIMES DAILY
COMMUNITY
End: 2021-04-27 | Stop reason: SDUPTHER

## 2021-04-27 RX ORDER — TRIAMCINOLONE ACETONIDE 1 MG/G
OINTMENT TOPICAL 2 TIMES DAILY PRN
Qty: 80 G | Refills: 11 | Status: ON HOLD | OUTPATIENT
Start: 2021-04-27 | End: 2023-03-02 | Stop reason: HOSPADM

## 2021-05-03 DIAGNOSIS — F51.01 PRIMARY INSOMNIA: ICD-10-CM

## 2021-05-04 RX ORDER — TEMAZEPAM 30 MG/1
CAPSULE ORAL
Qty: 30 CAPSULE | Refills: 2 | Status: SHIPPED | OUTPATIENT
Start: 2021-05-04 | End: 2021-09-07

## 2021-05-29 ENCOUNTER — PATIENT OUTREACH (OUTPATIENT)
Dept: ADMINISTRATIVE | Facility: OTHER | Age: 71
End: 2021-05-29

## 2021-06-23 RX ORDER — PANTOPRAZOLE SODIUM 40 MG/1
TABLET, DELAYED RELEASE ORAL
Qty: 90 TABLET | Refills: 3 | Status: SHIPPED | OUTPATIENT
Start: 2021-06-23 | End: 2023-03-06 | Stop reason: SDUPTHER

## 2021-06-29 ENCOUNTER — PATIENT MESSAGE (OUTPATIENT)
Dept: FAMILY MEDICINE | Facility: CLINIC | Age: 71
End: 2021-06-29

## 2021-06-29 ENCOUNTER — PATIENT OUTREACH (OUTPATIENT)
Dept: ADMINISTRATIVE | Facility: OTHER | Age: 71
End: 2021-06-29

## 2021-06-29 RX ORDER — INSULIN PUMP SYRINGE, 3 ML
EACH MISCELLANEOUS
Qty: 1 EACH | Refills: 0 | Status: ON HOLD | OUTPATIENT
Start: 2021-06-29 | End: 2023-03-06

## 2021-06-30 ENCOUNTER — PATIENT MESSAGE (OUTPATIENT)
Dept: PODIATRY | Facility: CLINIC | Age: 71
End: 2021-06-30

## 2021-06-30 ENCOUNTER — OFFICE VISIT (OUTPATIENT)
Dept: PODIATRY | Facility: CLINIC | Age: 71
End: 2021-06-30
Payer: COMMERCIAL

## 2021-06-30 VITALS — WEIGHT: 130.75 LBS | BODY MASS INDEX: 21.79 KG/M2 | HEIGHT: 65 IN

## 2021-06-30 DIAGNOSIS — B35.1 ONYCHOMYCOSIS DUE TO DERMATOPHYTE: ICD-10-CM

## 2021-06-30 DIAGNOSIS — M20.41 HAMMER TOES OF BOTH FEET: ICD-10-CM

## 2021-06-30 DIAGNOSIS — Q66.70 PES CAVUS: ICD-10-CM

## 2021-06-30 DIAGNOSIS — E11.42 DIABETIC POLYNEUROPATHY ASSOCIATED WITH TYPE 2 DIABETES MELLITUS: Primary | ICD-10-CM

## 2021-06-30 DIAGNOSIS — M77.8 EXTENSOR TENDONITIS OF FOOT: ICD-10-CM

## 2021-06-30 DIAGNOSIS — M20.42 HAMMER TOES OF BOTH FEET: ICD-10-CM

## 2021-06-30 DIAGNOSIS — R20.2 PARESTHESIA OF BOTH FEET: ICD-10-CM

## 2021-06-30 PROCEDURE — 3288F PR FALLS RISK ASSESSMENT DOCUMENTED: ICD-10-PCS | Mod: CPTII,S$GLB,, | Performed by: PODIATRIST

## 2021-06-30 PROCEDURE — 3008F BODY MASS INDEX DOCD: CPT | Mod: CPTII,S$GLB,, | Performed by: PODIATRIST

## 2021-06-30 PROCEDURE — 99999 PR PBB SHADOW E&M-EST. PATIENT-LVL III: CPT | Mod: PBBFAC,,, | Performed by: PODIATRIST

## 2021-06-30 PROCEDURE — 99499 UNLISTED E&M SERVICE: CPT | Mod: S$GLB,,, | Performed by: PODIATRIST

## 2021-06-30 PROCEDURE — 1100F PTFALLS ASSESS-DOCD GE2>/YR: CPT | Mod: CPTII,S$GLB,, | Performed by: PODIATRIST

## 2021-06-30 PROCEDURE — 1126F AMNT PAIN NOTED NONE PRSNT: CPT | Mod: S$GLB,,, | Performed by: PODIATRIST

## 2021-06-30 PROCEDURE — 3008F PR BODY MASS INDEX (BMI) DOCUMENTED: ICD-10-PCS | Mod: CPTII,S$GLB,, | Performed by: PODIATRIST

## 2021-06-30 PROCEDURE — 99999 PR PBB SHADOW E&M-EST. PATIENT-LVL III: ICD-10-PCS | Mod: PBBFAC,,, | Performed by: PODIATRIST

## 2021-06-30 PROCEDURE — 11721 DEBRIDE NAIL 6 OR MORE: CPT | Mod: S$GLB,,, | Performed by: PODIATRIST

## 2021-06-30 PROCEDURE — 1126F PR PAIN SEVERITY QUANTIFIED, NO PAIN PRESENT: ICD-10-PCS | Mod: S$GLB,,, | Performed by: PODIATRIST

## 2021-06-30 PROCEDURE — 99499 NO LOS: ICD-10-PCS | Mod: S$GLB,,, | Performed by: PODIATRIST

## 2021-06-30 PROCEDURE — 3288F FALL RISK ASSESSMENT DOCD: CPT | Mod: CPTII,S$GLB,, | Performed by: PODIATRIST

## 2021-06-30 PROCEDURE — 11721 PR DEBRIDEMENT OF NAILS, 6 OR MORE: ICD-10-PCS | Mod: S$GLB,,, | Performed by: PODIATRIST

## 2021-06-30 PROCEDURE — 1100F PR PT FALLS ASSESS DOC 2+ FALLS/FALL W/INJURY/YR: ICD-10-PCS | Mod: CPTII,S$GLB,, | Performed by: PODIATRIST

## 2021-07-13 PROBLEM — R42 VERTIGO: Status: RESOLVED | Noted: 2021-01-21 | Resolved: 2021-07-13

## 2021-07-13 PROBLEM — R26.81 UNSTEADY GAIT: Status: RESOLVED | Noted: 2021-01-21 | Resolved: 2021-07-13

## 2021-07-13 PROBLEM — R29.898 ANKLE WEAKNESS: Status: ACTIVE | Noted: 2021-07-13

## 2021-08-04 ENCOUNTER — PATIENT MESSAGE (OUTPATIENT)
Dept: FAMILY MEDICINE | Facility: CLINIC | Age: 71
End: 2021-08-04

## 2021-08-11 ENCOUNTER — LAB VISIT (OUTPATIENT)
Dept: FAMILY MEDICINE | Facility: CLINIC | Age: 71
End: 2021-08-11
Payer: COMMERCIAL

## 2021-08-11 DIAGNOSIS — R05.9 COUGH: ICD-10-CM

## 2021-08-11 PROCEDURE — U0003 INFECTIOUS AGENT DETECTION BY NUCLEIC ACID (DNA OR RNA); SEVERE ACUTE RESPIRATORY SYNDROME CORONAVIRUS 2 (SARS-COV-2) (CORONAVIRUS DISEASE [COVID-19]), AMPLIFIED PROBE TECHNIQUE, MAKING USE OF HIGH THROUGHPUT TECHNOLOGIES AS DESCRIBED BY CMS-2020-01-R: HCPCS | Performed by: NURSE PRACTITIONER

## 2021-08-11 PROCEDURE — U0005 INFEC AGEN DETEC AMPLI PROBE: HCPCS | Performed by: NURSE PRACTITIONER

## 2021-08-12 LAB
SARS-COV-2 RNA RESP QL NAA+PROBE: NOT DETECTED
SARS-COV-2- CYCLE NUMBER: -1

## 2021-08-19 ENCOUNTER — OFFICE VISIT (OUTPATIENT)
Dept: OBSTETRICS AND GYNECOLOGY | Facility: CLINIC | Age: 71
End: 2021-08-19
Attending: OBSTETRICS & GYNECOLOGY
Payer: COMMERCIAL

## 2021-08-19 VITALS
DIASTOLIC BLOOD PRESSURE: 80 MMHG | SYSTOLIC BLOOD PRESSURE: 122 MMHG | BODY MASS INDEX: 21.67 KG/M2 | HEIGHT: 65 IN | WEIGHT: 130.06 LBS

## 2021-08-19 DIAGNOSIS — Z12.31 BREAST CANCER SCREENING BY MAMMOGRAM: ICD-10-CM

## 2021-08-19 DIAGNOSIS — L40.9 PSORIASIS: ICD-10-CM

## 2021-08-19 DIAGNOSIS — Z01.419 ENCOUNTER FOR GYNECOLOGICAL EXAMINATION: Primary | ICD-10-CM

## 2021-08-19 DIAGNOSIS — R32 URINARY INCONTINENCE, UNSPECIFIED TYPE: ICD-10-CM

## 2021-08-19 DIAGNOSIS — N89.8 VAGINA ITCHING: ICD-10-CM

## 2021-08-19 PROCEDURE — 1159F PR MEDICATION LIST DOCUMENTED IN MEDICAL RECORD: ICD-10-PCS | Mod: CPTII,S$GLB,, | Performed by: OBSTETRICS & GYNECOLOGY

## 2021-08-19 PROCEDURE — 99999 PR PBB SHADOW E&M-EST. PATIENT-LVL V: ICD-10-PCS | Mod: PBBFAC,,, | Performed by: OBSTETRICS & GYNECOLOGY

## 2021-08-19 PROCEDURE — 1159F MED LIST DOCD IN RCRD: CPT | Mod: CPTII,S$GLB,, | Performed by: OBSTETRICS & GYNECOLOGY

## 2021-08-19 PROCEDURE — 3008F BODY MASS INDEX DOCD: CPT | Mod: CPTII,S$GLB,, | Performed by: OBSTETRICS & GYNECOLOGY

## 2021-08-19 PROCEDURE — 1126F PR PAIN SEVERITY QUANTIFIED, NO PAIN PRESENT: ICD-10-PCS | Mod: CPTII,S$GLB,, | Performed by: OBSTETRICS & GYNECOLOGY

## 2021-08-19 PROCEDURE — 3044F PR MOST RECENT HEMOGLOBIN A1C LEVEL <7.0%: ICD-10-PCS | Mod: CPTII,S$GLB,, | Performed by: OBSTETRICS & GYNECOLOGY

## 2021-08-19 PROCEDURE — 3079F PR MOST RECENT DIASTOLIC BLOOD PRESSURE 80-89 MM HG: ICD-10-PCS | Mod: CPTII,S$GLB,, | Performed by: OBSTETRICS & GYNECOLOGY

## 2021-08-19 PROCEDURE — 3288F FALL RISK ASSESSMENT DOCD: CPT | Mod: CPTII,S$GLB,, | Performed by: OBSTETRICS & GYNECOLOGY

## 2021-08-19 PROCEDURE — 99397 PR PREVENTIVE VISIT,EST,65 & OVER: ICD-10-PCS | Mod: S$GLB,,, | Performed by: OBSTETRICS & GYNECOLOGY

## 2021-08-19 PROCEDURE — 3074F PR MOST RECENT SYSTOLIC BLOOD PRESSURE < 130 MM HG: ICD-10-PCS | Mod: CPTII,S$GLB,, | Performed by: OBSTETRICS & GYNECOLOGY

## 2021-08-19 PROCEDURE — 3074F SYST BP LT 130 MM HG: CPT | Mod: CPTII,S$GLB,, | Performed by: OBSTETRICS & GYNECOLOGY

## 2021-08-19 PROCEDURE — 99397 PER PM REEVAL EST PAT 65+ YR: CPT | Mod: S$GLB,,, | Performed by: OBSTETRICS & GYNECOLOGY

## 2021-08-19 PROCEDURE — 87086 URINE CULTURE/COLONY COUNT: CPT | Performed by: OBSTETRICS & GYNECOLOGY

## 2021-08-19 PROCEDURE — 3008F PR BODY MASS INDEX (BMI) DOCUMENTED: ICD-10-PCS | Mod: CPTII,S$GLB,, | Performed by: OBSTETRICS & GYNECOLOGY

## 2021-08-19 PROCEDURE — 99999 PR PBB SHADOW E&M-EST. PATIENT-LVL V: CPT | Mod: PBBFAC,,, | Performed by: OBSTETRICS & GYNECOLOGY

## 2021-08-19 PROCEDURE — 1126F AMNT PAIN NOTED NONE PRSNT: CPT | Mod: CPTII,S$GLB,, | Performed by: OBSTETRICS & GYNECOLOGY

## 2021-08-19 PROCEDURE — 3044F HG A1C LEVEL LT 7.0%: CPT | Mod: CPTII,S$GLB,, | Performed by: OBSTETRICS & GYNECOLOGY

## 2021-08-19 PROCEDURE — 3079F DIAST BP 80-89 MM HG: CPT | Mod: CPTII,S$GLB,, | Performed by: OBSTETRICS & GYNECOLOGY

## 2021-08-19 PROCEDURE — 1101F PR PT FALLS ASSESS DOC 0-1 FALLS W/OUT INJ PAST YR: ICD-10-PCS | Mod: CPTII,S$GLB,, | Performed by: OBSTETRICS & GYNECOLOGY

## 2021-08-19 PROCEDURE — 3288F PR FALLS RISK ASSESSMENT DOCUMENTED: ICD-10-PCS | Mod: CPTII,S$GLB,, | Performed by: OBSTETRICS & GYNECOLOGY

## 2021-08-19 PROCEDURE — 1101F PT FALLS ASSESS-DOCD LE1/YR: CPT | Mod: CPTII,S$GLB,, | Performed by: OBSTETRICS & GYNECOLOGY

## 2021-08-19 RX ORDER — EPINEPHRINE 0.22MG
AEROSOL WITH ADAPTER (ML) INHALATION
Status: ON HOLD | COMMUNITY
End: 2024-02-06 | Stop reason: HOSPADM

## 2021-08-19 RX ORDER — CALCIUM CITRATE/VITAMIN D3 200MG-6.25
TABLET ORAL
COMMUNITY
Start: 2021-07-16 | End: 2022-01-04

## 2021-08-21 PROBLEM — L40.9 PSORIASIS: Status: ACTIVE | Noted: 2021-08-21

## 2021-08-21 LAB — BACTERIA UR CULT: NO GROWTH

## 2021-09-04 DIAGNOSIS — F51.01 PRIMARY INSOMNIA: ICD-10-CM

## 2021-09-07 RX ORDER — TEMAZEPAM 30 MG/1
CAPSULE ORAL
Qty: 30 CAPSULE | Refills: 2 | Status: SHIPPED | OUTPATIENT
Start: 2021-09-07 | End: 2021-11-30

## 2021-10-05 ENCOUNTER — OFFICE VISIT (OUTPATIENT)
Dept: PODIATRY | Facility: CLINIC | Age: 71
End: 2021-10-05
Payer: COMMERCIAL

## 2021-10-05 VITALS — BODY MASS INDEX: 21.67 KG/M2 | HEIGHT: 65 IN | WEIGHT: 130.06 LBS

## 2021-10-05 DIAGNOSIS — B35.1 ONYCHOMYCOSIS DUE TO DERMATOPHYTE: ICD-10-CM

## 2021-10-05 DIAGNOSIS — R20.2 PARESTHESIA OF BOTH FEET: ICD-10-CM

## 2021-10-05 DIAGNOSIS — M20.42 HAMMER TOES OF BOTH FEET: ICD-10-CM

## 2021-10-05 DIAGNOSIS — L84 CORN OR CALLUS: ICD-10-CM

## 2021-10-05 DIAGNOSIS — M20.41 HAMMER TOES OF BOTH FEET: ICD-10-CM

## 2021-10-05 DIAGNOSIS — E11.42 DIABETIC POLYNEUROPATHY ASSOCIATED WITH TYPE 2 DIABETES MELLITUS: Primary | ICD-10-CM

## 2021-10-05 PROCEDURE — 11721 PR DEBRIDEMENT OF NAILS, 6 OR MORE: ICD-10-PCS | Mod: 59,S$GLB,, | Performed by: PODIATRIST

## 2021-10-05 PROCEDURE — 11056 PARNG/CUTG B9 HYPRKR LES 2-4: CPT | Mod: S$GLB,,, | Performed by: PODIATRIST

## 2021-10-05 PROCEDURE — 11721 DEBRIDE NAIL 6 OR MORE: CPT | Mod: 59,S$GLB,, | Performed by: PODIATRIST

## 2021-10-05 PROCEDURE — 99999 PR PBB SHADOW E&M-EST. PATIENT-LVL IV: ICD-10-PCS | Mod: PBBFAC,,, | Performed by: PODIATRIST

## 2021-10-05 PROCEDURE — 99499 NO LOS: ICD-10-PCS | Mod: S$GLB,,, | Performed by: PODIATRIST

## 2021-10-05 PROCEDURE — 1159F MED LIST DOCD IN RCRD: CPT | Mod: CPTII,S$GLB,, | Performed by: PODIATRIST

## 2021-10-05 PROCEDURE — 3044F PR MOST RECENT HEMOGLOBIN A1C LEVEL <7.0%: ICD-10-PCS | Mod: CPTII,S$GLB,, | Performed by: PODIATRIST

## 2021-10-05 PROCEDURE — 3044F HG A1C LEVEL LT 7.0%: CPT | Mod: CPTII,S$GLB,, | Performed by: PODIATRIST

## 2021-10-05 PROCEDURE — 1126F AMNT PAIN NOTED NONE PRSNT: CPT | Mod: CPTII,S$GLB,, | Performed by: PODIATRIST

## 2021-10-05 PROCEDURE — 11056 PR TRIM BENIGN HYPERKERATOTIC SKIN LESION,2-4: ICD-10-PCS | Mod: S$GLB,,, | Performed by: PODIATRIST

## 2021-10-05 PROCEDURE — 99499 UNLISTED E&M SERVICE: CPT | Mod: S$GLB,,, | Performed by: PODIATRIST

## 2021-10-05 PROCEDURE — 1126F PR PAIN SEVERITY QUANTIFIED, NO PAIN PRESENT: ICD-10-PCS | Mod: CPTII,S$GLB,, | Performed by: PODIATRIST

## 2021-10-05 PROCEDURE — 1159F PR MEDICATION LIST DOCUMENTED IN MEDICAL RECORD: ICD-10-PCS | Mod: CPTII,S$GLB,, | Performed by: PODIATRIST

## 2021-10-05 PROCEDURE — 99999 PR PBB SHADOW E&M-EST. PATIENT-LVL IV: CPT | Mod: PBBFAC,,, | Performed by: PODIATRIST

## 2021-10-05 PROCEDURE — 3008F BODY MASS INDEX DOCD: CPT | Mod: CPTII,S$GLB,, | Performed by: PODIATRIST

## 2021-10-05 PROCEDURE — 3008F PR BODY MASS INDEX (BMI) DOCUMENTED: ICD-10-PCS | Mod: CPTII,S$GLB,, | Performed by: PODIATRIST

## 2021-10-11 ENCOUNTER — PATIENT MESSAGE (OUTPATIENT)
Dept: FAMILY MEDICINE | Facility: CLINIC | Age: 71
End: 2021-10-11

## 2021-10-11 ENCOUNTER — TELEPHONE (OUTPATIENT)
Dept: UROLOGY | Facility: CLINIC | Age: 71
End: 2021-10-11

## 2021-10-11 DIAGNOSIS — K21.9 GERD WITHOUT ESOPHAGITIS: ICD-10-CM

## 2021-10-11 DIAGNOSIS — E78.2 HYPERLIPIDEMIA, MIXED: ICD-10-CM

## 2021-10-11 DIAGNOSIS — E11.42 TYPE 2 DIABETES MELLITUS WITH DIABETIC POLYNEUROPATHY, WITHOUT LONG-TERM CURRENT USE OF INSULIN: Primary | ICD-10-CM

## 2021-10-11 DIAGNOSIS — R35.0 URINARY FREQUENCY: Primary | ICD-10-CM

## 2021-10-26 ENCOUNTER — OFFICE VISIT (OUTPATIENT)
Dept: FAMILY MEDICINE | Facility: CLINIC | Age: 71
End: 2021-10-26
Payer: MEDICARE

## 2021-10-26 VITALS
DIASTOLIC BLOOD PRESSURE: 68 MMHG | WEIGHT: 132.38 LBS | TEMPERATURE: 98 F | HEART RATE: 71 BPM | BODY MASS INDEX: 22.06 KG/M2 | SYSTOLIC BLOOD PRESSURE: 112 MMHG | OXYGEN SATURATION: 98 % | HEIGHT: 65 IN

## 2021-10-26 DIAGNOSIS — K90.0 CELIAC DISEASE: ICD-10-CM

## 2021-10-26 DIAGNOSIS — H81.03 MENIERE'S DISEASE OF BOTH EARS: ICD-10-CM

## 2021-10-26 DIAGNOSIS — G89.29 CHRONIC NECK PAIN: ICD-10-CM

## 2021-10-26 DIAGNOSIS — M50.30 DDD (DEGENERATIVE DISC DISEASE), CERVICAL: ICD-10-CM

## 2021-10-26 DIAGNOSIS — F51.01 PRIMARY INSOMNIA: ICD-10-CM

## 2021-10-26 DIAGNOSIS — F33.0 MILD RECURRENT MAJOR DEPRESSION: ICD-10-CM

## 2021-10-26 DIAGNOSIS — K21.9 GERD WITHOUT ESOPHAGITIS: ICD-10-CM

## 2021-10-26 DIAGNOSIS — E78.2 HYPERLIPIDEMIA, MIXED: ICD-10-CM

## 2021-10-26 DIAGNOSIS — G25.0 BENIGN ESSENTIAL TREMOR: ICD-10-CM

## 2021-10-26 DIAGNOSIS — M54.2 CHRONIC NECK PAIN: ICD-10-CM

## 2021-10-26 DIAGNOSIS — E11.42 TYPE 2 DIABETES MELLITUS WITH DIABETIC POLYNEUROPATHY, WITHOUT LONG-TERM CURRENT USE OF INSULIN: Primary | ICD-10-CM

## 2021-10-26 PROCEDURE — 99999 PR PBB SHADOW E&M-EST. PATIENT-LVL V: ICD-10-PCS | Mod: PBBFAC,,, | Performed by: INTERNAL MEDICINE

## 2021-10-26 PROCEDURE — 3074F SYST BP LT 130 MM HG: CPT | Mod: CPTII,S$GLB,, | Performed by: INTERNAL MEDICINE

## 2021-10-26 PROCEDURE — 1159F MED LIST DOCD IN RCRD: CPT | Mod: CPTII,S$GLB,, | Performed by: INTERNAL MEDICINE

## 2021-10-26 PROCEDURE — 99214 PR OFFICE/OUTPT VISIT, EST, LEVL IV, 30-39 MIN: ICD-10-PCS | Mod: S$GLB,,, | Performed by: INTERNAL MEDICINE

## 2021-10-26 PROCEDURE — 3008F BODY MASS INDEX DOCD: CPT | Mod: CPTII,S$GLB,, | Performed by: INTERNAL MEDICINE

## 2021-10-26 PROCEDURE — 3044F PR MOST RECENT HEMOGLOBIN A1C LEVEL <7.0%: ICD-10-PCS | Mod: CPTII,S$GLB,, | Performed by: INTERNAL MEDICINE

## 2021-10-26 PROCEDURE — 3044F HG A1C LEVEL LT 7.0%: CPT | Mod: CPTII,S$GLB,, | Performed by: INTERNAL MEDICINE

## 2021-10-26 PROCEDURE — 99214 OFFICE O/P EST MOD 30 MIN: CPT | Mod: S$GLB,,, | Performed by: INTERNAL MEDICINE

## 2021-10-26 PROCEDURE — 3066F PR DOCUMENTATION OF TREATMENT FOR NEPHROPATHY: ICD-10-PCS | Mod: CPTII,S$GLB,, | Performed by: INTERNAL MEDICINE

## 2021-10-26 PROCEDURE — 1160F PR REVIEW ALL MEDS BY PRESCRIBER/CLIN PHARMACIST DOCUMENTED: ICD-10-PCS | Mod: CPTII,S$GLB,, | Performed by: INTERNAL MEDICINE

## 2021-10-26 PROCEDURE — 1160F RVW MEDS BY RX/DR IN RCRD: CPT | Mod: CPTII,S$GLB,, | Performed by: INTERNAL MEDICINE

## 2021-10-26 PROCEDURE — 3008F PR BODY MASS INDEX (BMI) DOCUMENTED: ICD-10-PCS | Mod: CPTII,S$GLB,, | Performed by: INTERNAL MEDICINE

## 2021-10-26 PROCEDURE — 3078F DIAST BP <80 MM HG: CPT | Mod: CPTII,S$GLB,, | Performed by: INTERNAL MEDICINE

## 2021-10-26 PROCEDURE — 99999 PR PBB SHADOW E&M-EST. PATIENT-LVL V: CPT | Mod: PBBFAC,,, | Performed by: INTERNAL MEDICINE

## 2021-10-26 PROCEDURE — 3066F NEPHROPATHY DOC TX: CPT | Mod: CPTII,S$GLB,, | Performed by: INTERNAL MEDICINE

## 2021-10-26 PROCEDURE — 1126F AMNT PAIN NOTED NONE PRSNT: CPT | Mod: CPTII,S$GLB,, | Performed by: INTERNAL MEDICINE

## 2021-10-26 PROCEDURE — 1126F PR PAIN SEVERITY QUANTIFIED, NO PAIN PRESENT: ICD-10-PCS | Mod: CPTII,S$GLB,, | Performed by: INTERNAL MEDICINE

## 2021-10-26 PROCEDURE — 3074F PR MOST RECENT SYSTOLIC BLOOD PRESSURE < 130 MM HG: ICD-10-PCS | Mod: CPTII,S$GLB,, | Performed by: INTERNAL MEDICINE

## 2021-10-26 PROCEDURE — 3078F PR MOST RECENT DIASTOLIC BLOOD PRESSURE < 80 MM HG: ICD-10-PCS | Mod: CPTII,S$GLB,, | Performed by: INTERNAL MEDICINE

## 2021-10-26 PROCEDURE — 3061F NEG MICROALBUMINURIA REV: CPT | Mod: CPTII,S$GLB,, | Performed by: INTERNAL MEDICINE

## 2021-10-26 PROCEDURE — 3061F PR NEG MICROALBUMINURIA RESULT DOCUMENTED/REVIEW: ICD-10-PCS | Mod: CPTII,S$GLB,, | Performed by: INTERNAL MEDICINE

## 2021-10-26 PROCEDURE — 1159F PR MEDICATION LIST DOCUMENTED IN MEDICAL RECORD: ICD-10-PCS | Mod: CPTII,S$GLB,, | Performed by: INTERNAL MEDICINE

## 2021-10-26 RX ORDER — TRIAMTERENE/HYDROCHLOROTHIAZID 37.5-25 MG
TABLET ORAL
Status: ON HOLD | COMMUNITY
Start: 2021-10-06 | End: 2023-03-02 | Stop reason: HOSPADM

## 2021-10-27 ENCOUNTER — PATIENT MESSAGE (OUTPATIENT)
Dept: UROLOGY | Facility: CLINIC | Age: 71
End: 2021-10-27
Payer: MEDICARE

## 2021-11-16 ENCOUNTER — PATIENT OUTREACH (OUTPATIENT)
Dept: ADMINISTRATIVE | Facility: OTHER | Age: 71
End: 2021-11-16
Payer: MEDICARE

## 2021-11-17 ENCOUNTER — OFFICE VISIT (OUTPATIENT)
Dept: PAIN MEDICINE | Facility: CLINIC | Age: 71
End: 2021-11-17
Payer: MEDICARE

## 2021-11-17 VITALS — DIASTOLIC BLOOD PRESSURE: 82 MMHG | SYSTOLIC BLOOD PRESSURE: 127 MMHG | HEART RATE: 82 BPM

## 2021-11-17 DIAGNOSIS — G60.9 IDIOPATHIC NEUROPATHY: Primary | ICD-10-CM

## 2021-11-17 PROCEDURE — 3044F HG A1C LEVEL LT 7.0%: CPT | Mod: CPTII,S$GLB,, | Performed by: PAIN MEDICINE

## 2021-11-17 PROCEDURE — 1159F MED LIST DOCD IN RCRD: CPT | Mod: CPTII,S$GLB,, | Performed by: PAIN MEDICINE

## 2021-11-17 PROCEDURE — 3074F PR MOST RECENT SYSTOLIC BLOOD PRESSURE < 130 MM HG: ICD-10-PCS | Mod: CPTII,S$GLB,, | Performed by: PAIN MEDICINE

## 2021-11-17 PROCEDURE — 99204 OFFICE O/P NEW MOD 45 MIN: CPT | Mod: S$GLB,,, | Performed by: PAIN MEDICINE

## 2021-11-17 PROCEDURE — 3074F SYST BP LT 130 MM HG: CPT | Mod: CPTII,S$GLB,, | Performed by: PAIN MEDICINE

## 2021-11-17 PROCEDURE — 3044F PR MOST RECENT HEMOGLOBIN A1C LEVEL <7.0%: ICD-10-PCS | Mod: CPTII,S$GLB,, | Performed by: PAIN MEDICINE

## 2021-11-17 PROCEDURE — 99999 PR PBB SHADOW E&M-EST. PATIENT-LVL IV: ICD-10-PCS | Mod: PBBFAC,,, | Performed by: PAIN MEDICINE

## 2021-11-17 PROCEDURE — 99999 PR PBB SHADOW E&M-EST. PATIENT-LVL IV: CPT | Mod: PBBFAC,,, | Performed by: PAIN MEDICINE

## 2021-11-17 PROCEDURE — 3079F DIAST BP 80-89 MM HG: CPT | Mod: CPTII,S$GLB,, | Performed by: PAIN MEDICINE

## 2021-11-17 PROCEDURE — 1125F AMNT PAIN NOTED PAIN PRSNT: CPT | Mod: CPTII,S$GLB,, | Performed by: PAIN MEDICINE

## 2021-11-17 PROCEDURE — 99204 PR OFFICE/OUTPT VISIT, NEW, LEVL IV, 45-59 MIN: ICD-10-PCS | Mod: S$GLB,,, | Performed by: PAIN MEDICINE

## 2021-11-17 PROCEDURE — 1125F PR PAIN SEVERITY QUANTIFIED, PAIN PRESENT: ICD-10-PCS | Mod: CPTII,S$GLB,, | Performed by: PAIN MEDICINE

## 2021-11-17 PROCEDURE — 3066F PR DOCUMENTATION OF TREATMENT FOR NEPHROPATHY: ICD-10-PCS | Mod: CPTII,S$GLB,, | Performed by: PAIN MEDICINE

## 2021-11-17 PROCEDURE — 3066F NEPHROPATHY DOC TX: CPT | Mod: CPTII,S$GLB,, | Performed by: PAIN MEDICINE

## 2021-11-17 PROCEDURE — 3079F PR MOST RECENT DIASTOLIC BLOOD PRESSURE 80-89 MM HG: ICD-10-PCS | Mod: CPTII,S$GLB,, | Performed by: PAIN MEDICINE

## 2021-11-17 PROCEDURE — 3061F PR NEG MICROALBUMINURIA RESULT DOCUMENTED/REVIEW: ICD-10-PCS | Mod: CPTII,S$GLB,, | Performed by: PAIN MEDICINE

## 2021-11-17 PROCEDURE — 1159F PR MEDICATION LIST DOCUMENTED IN MEDICAL RECORD: ICD-10-PCS | Mod: CPTII,S$GLB,, | Performed by: PAIN MEDICINE

## 2021-11-17 PROCEDURE — 3061F NEG MICROALBUMINURIA REV: CPT | Mod: CPTII,S$GLB,, | Performed by: PAIN MEDICINE

## 2021-11-18 ENCOUNTER — PATIENT MESSAGE (OUTPATIENT)
Dept: PAIN MEDICINE | Facility: CLINIC | Age: 71
End: 2021-11-18
Payer: MEDICARE

## 2021-11-18 RX ORDER — PREGABALIN 25 MG/1
25 CAPSULE ORAL 2 TIMES DAILY
Qty: 60 CAPSULE | Refills: 6 | Status: SHIPPED | OUTPATIENT
Start: 2021-11-18 | End: 2022-06-01 | Stop reason: SDUPTHER

## 2021-11-19 PROBLEM — G60.9 IDIOPATHIC NEUROPATHY: Status: ACTIVE | Noted: 2021-11-19

## 2021-11-30 DIAGNOSIS — F51.01 PRIMARY INSOMNIA: ICD-10-CM

## 2021-11-30 RX ORDER — TEMAZEPAM 30 MG/1
CAPSULE ORAL
Qty: 30 CAPSULE | Refills: 2 | Status: SHIPPED | OUTPATIENT
Start: 2021-11-30 | End: 2022-02-22

## 2021-12-04 ENCOUNTER — PATIENT MESSAGE (OUTPATIENT)
Dept: FAMILY MEDICINE | Facility: CLINIC | Age: 71
End: 2021-12-04
Payer: MEDICARE

## 2021-12-07 ENCOUNTER — PATIENT MESSAGE (OUTPATIENT)
Dept: FAMILY MEDICINE | Facility: CLINIC | Age: 71
End: 2021-12-07
Payer: MEDICARE

## 2021-12-09 ENCOUNTER — CLINICAL SUPPORT (OUTPATIENT)
Dept: URGENT CARE | Facility: CLINIC | Age: 71
End: 2021-12-09
Payer: MEDICARE

## 2021-12-09 DIAGNOSIS — Z11.52 ENCOUNTER FOR SCREENING LABORATORY TESTING FOR COVID-19 VIRUS: Primary | ICD-10-CM

## 2021-12-09 LAB
CTP QC/QA: YES
SARS-COV-2 RDRP RESP QL NAA+PROBE: NEGATIVE

## 2021-12-09 PROCEDURE — U0002 COVID-19 LAB TEST NON-CDC: HCPCS | Mod: QW,S$GLB,, | Performed by: NURSE PRACTITIONER

## 2021-12-09 PROCEDURE — U0002: ICD-10-PCS | Mod: QW,S$GLB,, | Performed by: NURSE PRACTITIONER

## 2021-12-16 ENCOUNTER — OFFICE VISIT (OUTPATIENT)
Dept: PAIN MEDICINE | Facility: CLINIC | Age: 71
End: 2021-12-16
Payer: MEDICARE

## 2021-12-16 VITALS
SYSTOLIC BLOOD PRESSURE: 132 MMHG | DIASTOLIC BLOOD PRESSURE: 78 MMHG | BODY MASS INDEX: 22.05 KG/M2 | HEART RATE: 66 BPM | WEIGHT: 132.5 LBS

## 2021-12-16 DIAGNOSIS — G60.9 IDIOPATHIC NEUROPATHY: Primary | ICD-10-CM

## 2021-12-16 PROCEDURE — 99999 PR PBB SHADOW E&M-EST. PATIENT-LVL IV: CPT | Mod: PBBFAC,,, | Performed by: NURSE PRACTITIONER

## 2021-12-16 PROCEDURE — 99214 PR OFFICE/OUTPT VISIT, EST, LEVL IV, 30-39 MIN: ICD-10-PCS | Mod: S$GLB,,, | Performed by: NURSE PRACTITIONER

## 2021-12-16 PROCEDURE — 3061F NEG MICROALBUMINURIA REV: CPT | Mod: CPTII,S$GLB,, | Performed by: NURSE PRACTITIONER

## 2021-12-16 PROCEDURE — 3066F PR DOCUMENTATION OF TREATMENT FOR NEPHROPATHY: ICD-10-PCS | Mod: CPTII,S$GLB,, | Performed by: NURSE PRACTITIONER

## 2021-12-16 PROCEDURE — 99214 OFFICE O/P EST MOD 30 MIN: CPT | Mod: S$GLB,,, | Performed by: NURSE PRACTITIONER

## 2021-12-16 PROCEDURE — 3066F NEPHROPATHY DOC TX: CPT | Mod: CPTII,S$GLB,, | Performed by: NURSE PRACTITIONER

## 2021-12-16 PROCEDURE — 99999 PR PBB SHADOW E&M-EST. PATIENT-LVL IV: ICD-10-PCS | Mod: PBBFAC,,, | Performed by: NURSE PRACTITIONER

## 2021-12-16 PROCEDURE — 3061F PR NEG MICROALBUMINURIA RESULT DOCUMENTED/REVIEW: ICD-10-PCS | Mod: CPTII,S$GLB,, | Performed by: NURSE PRACTITIONER

## 2021-12-16 NOTE — H&P (VIEW-ONLY)
[facility]    Referred by: No ref. provider found   Reason for referral: * No diagnoses found *     CC:   Chief Complaint   Patient presents with    Foot Pain             Interval Update: 12/16/2021 - Ms. Green is following up for Foot Pain. Pain is currently rate 2/10 with a weekly range 2-4/10.  Patient continues to complain of bilateral foot pain she was previously started on Lyrica 25 mg t.i.d. which she continues to take she reports no adverse side effects however she reports no significant relief with this medication.      Subjective:   Lily Green is a 71 y.o. female who has a past medical history of Anxiety, Celiac disease, Celiac disease, Depression, Diabetes mellitus, Family history of breast cancer in mother, Hyperlipidemia, Hypertension, Meniere disease, Menopause, Peptic ulcer, Reflux esophagitis, Urinary tract infection, Vaginal infection, and Vaginal Pap smear. She complains of pain as described below.    Location: bilateral feet   Onset: pain presents for several years but pain has been moderate to severe for about a year; especially bad since Oct 2021  Radiation: None  Timing: persistent  Current Pain Score: 2/10  Weekly Pain Range: 2-3/10  Quality: Burning, Tingling and Numb  Worsened by: walking for more than 10 minutes  Improved by: medications  Limitations: walking, crossing legs    Previous Therapies:  PT/OT:   HEP:   TENS:   Interventions:   Surgery: None relevant  Opioids: No  Adjuvants: Yes, herbal substances; Gabapentin gave no relief at 600 mg TID  Denies: duloxetine    Current Pain Medications:  1. OTC supplements to address nerve pain  2. CBD oil - topical   3. [employer] topical oral works  4. Effexor ER 75 mg daily    Assessment & Plan:  Problem List Items Addressed This Visit    None         11/17/2021 - this is a 71-year-old female with well-controlled type 2 diabetes, degenerative disc disease, chronic knee pain, and celiac  disease who presents complaining of progressive numbness and tingling starting in the toes and moving proximally on the legs over the course of the past few months to years.  Patient was advised that the underlying cause of her neuropathy is not quite clear because her type 2 diabetes has been well controlled.  Her neuropathy could be related to a vitamin deficiency caused by celiac disease, it could be related to type 2 diabetes, and it could even be idiopathic.  Regardless of the underlying cause, I have had good success in the past treating patients with a combination of pregabalin and duloxetine.  She is currently taking venlafaxine to treat depression which may be optimal for her mental health but is suboptimal for the treatment of peripheral neuropathy.  Nevertheless, we can start with a trial of pregabalin 25 mg t.i.d. a plan to titrate to affect.  Should a trial of duloxetine be required in the future, I will reach out to her primary care physician, Dr. Calixto, for assistance with the transition.    12/16/2021-Lily Green is a 71 y.o. female who  has a past medical history of Anxiety, Celiac disease (05/2018), Celiac disease, Depression, Diabetes mellitus, Family history of breast cancer in mother, Hyperlipidemia, Hypertension, Meniere disease, Menopause, Peptic ulcer, Reflux esophagitis, Urinary tract infection, Vaginal infection, and Vaginal Pap smear (04/07/2014).  By history and examination this patient has chronic bilateral foot pain etiology is still unclear at this point.  She was previously started on pregabalin 25 mg t.i.d. with minimal relief, recommended we follow Dr. Rizo plan and transition her from Effexor to duloxetine see if this would provide her added benefit to her bilateral foot pain.  Discussed I will send a message to Dr. Calixto recommending a transition from Effexor to duloxetine in addition to her pregabalin.  Once patient has transition to duloxetine discussed  that she will follow up with our office in 4-6 weeks to discuss effectiveness of medication.      1.   Continue  Pregabalin 25 mg TID  1.  Transition from effexor to duloxetine per Dr. Calixto, message sent today   2. Message Dr. Rizo regarding consideration of pain interventions help with pain.  3. Patient instructed to follow-up with the office 4-6 weeks after being on duloxetine and Lyrica.      Follow Up:  Follow-up 4- 6 weeks after starting the combination of Lyrica and duloxetine.      Jose Rainey NP-C  Interventional Pain Management    Disclaimer: This note was partly generated using dictation software which may occasionally result in transcription errors.    Imaging:  NA    Review of Systems:  Review of Systems   Constitutional: Negative for chills and fever.   HENT: Negative for nosebleeds.    Eyes: Negative for blurred vision and pain.   Respiratory: Negative for hemoptysis.    Cardiovascular: Negative for chest pain and palpitations.   Gastrointestinal: Negative for heartburn, nausea and vomiting.   Genitourinary: Negative for dysuria and hematuria.   Musculoskeletal: Positive for joint pain. Negative for myalgias.   Skin: Negative for rash.   Neurological: Positive for tingling and sensory change. Negative for seizures and loss of consciousness.   Endo/Heme/Allergies: Does not bruise/bleed easily.   Psychiatric/Behavioral: Positive for depression. Negative for hallucinations.       Physical Exam:  Vitals:    12/16/21 1439   BP: 132/78   Pulse: 66   Weight: 60.1 kg (132 lb 7.9 oz)   PainSc:   2     General    Nursing note and vitals reviewed.  Constitutional: She is oriented to person, place, and time. She appears well-developed and well-nourished. No distress.   HENT:   Head: Normocephalic and atraumatic.   Nose: Nose normal.   Eyes: Conjunctivae and EOM are normal. Pupils are equal, round, and reactive to light. Right eye exhibits no discharge. Left eye exhibits no discharge. No scleral icterus.    Neck: No JVD present.   Cardiovascular: Intact distal pulses.    Pulmonary/Chest: Effort normal. No respiratory distress.   Abdominal: She exhibits no distension.   Neurological: She is alert and oriented to person, place, and time. Coordination normal.   Psychiatric: She has a normal mood and affect. Her behavior is normal. Judgment and thought content normal.     General Musculoskeletal Exam   Gait: normal     Right Ankle/Foot Exam     Inspection   Scars: absent  Deformity: absent  Erythema: absent  Atrophy: Foot - absent    Muscle Strength   The patient has normal right ankle strength.    Other   Sensation: decreased    Left Ankle/Foot Exam     Inspection  Deformity: absent  Erythema: absent  Atrophy: Foot - absent  Scars: absent    Muscle Strength   The patient has normal left ankle strength.    Other   Sensation: decreased      Vascular Exam     Right Pulses  Dorsalis Pedis:      2+  Posterior Tibial:      2+        Left Pulses  Dorsalis Pedis:      2+  Posterior Tibial:      2+        Edema  Right Lower Leg: absent  Left Lower Leg: absent

## 2021-12-17 ENCOUNTER — TELEPHONE (OUTPATIENT)
Dept: PAIN MEDICINE | Facility: CLINIC | Age: 71
End: 2021-12-17
Payer: MEDICARE

## 2021-12-27 ENCOUNTER — TELEPHONE (OUTPATIENT)
Dept: PAIN MEDICINE | Facility: CLINIC | Age: 71
End: 2021-12-27
Payer: MEDICARE

## 2021-12-27 DIAGNOSIS — E78.2 HYPERLIPIDEMIA, MIXED: ICD-10-CM

## 2021-12-27 RX ORDER — ATORVASTATIN CALCIUM 40 MG/1
TABLET, FILM COATED ORAL
Qty: 90 TABLET | Refills: 3 | Status: SHIPPED | OUTPATIENT
Start: 2021-12-27 | End: 2022-12-05

## 2021-12-30 ENCOUNTER — TELEPHONE (OUTPATIENT)
Dept: INTERNAL MEDICINE | Facility: CLINIC | Age: 71
End: 2021-12-30
Payer: MEDICARE

## 2021-12-30 ENCOUNTER — PATIENT MESSAGE (OUTPATIENT)
Dept: PAIN MEDICINE | Facility: CLINIC | Age: 71
End: 2021-12-30
Payer: MEDICARE

## 2021-12-30 ENCOUNTER — TELEPHONE (OUTPATIENT)
Dept: PAIN MEDICINE | Facility: CLINIC | Age: 71
End: 2021-12-30
Payer: MEDICARE

## 2021-12-30 DIAGNOSIS — G60.9 IDIOPATHIC NEUROPATHY: Primary | ICD-10-CM

## 2021-12-30 DIAGNOSIS — M79.671 BILATERAL FOOT PAIN: ICD-10-CM

## 2021-12-30 DIAGNOSIS — M79.672 BILATERAL FOOT PAIN: ICD-10-CM

## 2021-12-30 DIAGNOSIS — G60.9 IDIOPATHIC NEUROPATHY: ICD-10-CM

## 2021-12-30 DIAGNOSIS — F33.0 MILD RECURRENT MAJOR DEPRESSION: Primary | ICD-10-CM

## 2021-12-30 RX ORDER — DULOXETIN HYDROCHLORIDE 30 MG/1
30 CAPSULE, DELAYED RELEASE ORAL DAILY
Qty: 30 CAPSULE | Refills: 11 | Status: SHIPPED | OUTPATIENT
Start: 2021-12-30 | End: 2022-09-08 | Stop reason: SDUPTHER

## 2022-01-03 ENCOUNTER — PATIENT MESSAGE (OUTPATIENT)
Dept: PAIN MEDICINE | Facility: HOSPITAL | Age: 72
End: 2022-01-03
Payer: MEDICARE

## 2022-01-04 ENCOUNTER — PATIENT MESSAGE (OUTPATIENT)
Dept: FAMILY MEDICINE | Facility: CLINIC | Age: 72
End: 2022-01-04
Payer: MEDICARE

## 2022-01-04 RX ORDER — CALCIUM CITRATE/VITAMIN D3 200MG-6.25
TABLET ORAL
Qty: 200 EACH | Refills: 3 | Status: SHIPPED | OUTPATIENT
Start: 2022-01-04 | End: 2022-01-05 | Stop reason: SDUPTHER

## 2022-01-04 NOTE — TELEPHONE ENCOUNTER
Refill Routing Note   Medication(s) are not appropriate for processing by [facility] for the following reason(s):      - Medication not previously prescribed by PCP    [facility] action(s):  Defer       Medication Therapy Plan: not previously prescribed by pcp; please advise; defer   --->Care Gap information included in message below if applicable.   Medication reconciliation completed: No   Automatic Epic Generated Protocol Data:        Requested Prescriptions   Pending Prescriptions Disp Refills    TRUE METRIX GLUCOSE TEST STRIP Strp [Pharmacy Med Name: TRUE METRIX STRIPS       DAVID] 200 each 3     Sig: USE ONE STRIP FOR TESTING 2 TIMES A DAY       Endocrinology: Diabetes - Supplies Passed - 1/4/2022 12:14 PM        Passed - Patient is at least 18 years old        Passed - Valid encounter within last 15 months     Recent Visits  Date Type Provider Dept   10/26/21 Office Visit Pavel Calixto MD Replaced by [facility]   04/27/21 Office Visit Pavel Calixto MD Replaced by [facility]   10/27/20 Office Visit Pavel Calixto MD Replaced by [facility]   04/22/20 Office Visit Pavel Calixto MD Replaced by [facility]   Showing recent visits within past 720 days and meeting all other requirements  Future Appointments  No visits were found meeting these conditions.  Showing future appointments within next 150 days and meeting all other requirements      Future Appointments              In 2 weeks LILLIAM Colby - Podiatry, [place]    In 1 month MD [facility] Bush Jr. - Pain Management, [place]    In 3 months Pavel Calixto MD [facility] Primary [facility] [address] [place] [address]                Passed - HBA1C within 1080 days     Lab Results   Component Value Date    HGBA1C 6.2 (H) 10/19/2021    HGBA1C 6.1 (H) 04/20/2021    HGBA1C 6.0 (H) 10/20/2020                    Appointments  past 12m or future 3m with PCP    Date Provider   Last Visit   10/26/2021 Pavel Calixto MD    Next Visit   4/27/2022 Pavel Calixto MD   ED visits in past 90 days: 0        Note composed:12:15 PM 01/04/2022

## 2022-01-04 NOTE — TELEPHONE ENCOUNTER
No new care gaps identified.  Powered by [employer] by [employer]. Reference number: [id number].   1/04/2022 8:45:46 AM CST

## 2022-01-05 ENCOUNTER — TELEPHONE (OUTPATIENT)
Dept: PAIN MEDICINE | Facility: CLINIC | Age: 72
End: 2022-01-05
Payer: MEDICARE

## 2022-01-05 NOTE — DISCHARGE INSTRUCTIONS
Home Care Instructions Pain Management:    1.  DIET:    You may resume your normal diet today.    2.  BATHING:    You may shower with luke warm water.    3.  DRESSING:    You may remove your bandage today.    4.  ACTIVITY LEVEL:      You may resume your normal activities 24 hours after your procedure.    5.  MEDICATIONS:    You may resume your normal medications today.    6.  SPECIAL INSTRUCTIONS:    No heat to the injection site for 24 hours including bath or shower, heating pad, moist heat or hot tubs.    Use an ice pack to the injection site for any pain or discomfort.  Apply ice packs for 20 minute intervals as needed.    If you have received any sedatives by mouth today, you can not drive for 12 hours.    If you have received sedation through an IV, you can not drive for 24 hours.    PLEASE CALL YOUR DOCTOR FOR THE FOLLOWIN.  Redness or swelling around the injection site.  2.  Fever of 101 degrees.  3.  Drainage (pus) from the injection site.  4.  For any continuous bleeding (some dried blood over the incision is normal.)    FOR EMERGENCIES:    If any unusual problems or difficulties occur during clinic hours, call [phone] or dial [phone].    Follow up with with your physician in 2-3 weeks.

## 2022-01-06 ENCOUNTER — HOSPITAL ENCOUNTER (OUTPATIENT)
Facility: HOSPITAL | Age: 72
Discharge: HOME | End: 2022-01-06
Attending: PAIN MEDICINE | Admitting: PAIN MEDICINE
Payer: MEDICARE

## 2022-01-06 VITALS
BODY MASS INDEX: 21.99 KG/M2 | HEART RATE: 62 BPM | TEMPERATURE: 98 F | WEIGHT: 132 LBS | RESPIRATION RATE: 16 BRPM | HEIGHT: 65 IN | DIASTOLIC BLOOD PRESSURE: 72 MMHG | SYSTOLIC BLOOD PRESSURE: 146 MMHG | OXYGEN SATURATION: 100 %

## 2022-01-06 DIAGNOSIS — M79.2 NEUROPATHIC PAIN: Primary | ICD-10-CM

## 2022-01-06 DIAGNOSIS — G89.29 CHRONIC PAIN: ICD-10-CM

## 2022-01-06 DIAGNOSIS — G60.9 IDIOPATHIC NEUROPATHY: ICD-10-CM

## 2022-01-06 PROCEDURE — 64520 PR INJECT NERV BLCK,PARAVERT SYMPATH: ICD-10-PCS | Mod: 50,,, | Performed by: PAIN MEDICINE

## 2022-01-06 PROCEDURE — 64520 N BLOCK LUMBAR/THORACIC: CPT | Mod: 50 | Performed by: PAIN MEDICINE

## 2022-01-06 PROCEDURE — 63600175 PHARM REV CODE 636 W HCPCS: Performed by: PAIN MEDICINE

## 2022-01-06 PROCEDURE — 25500020 PHARM REV CODE 255: Performed by: PAIN MEDICINE

## 2022-01-06 PROCEDURE — 64520 N BLOCK LUMBAR/THORACIC: CPT | Mod: 50,,, | Performed by: PAIN MEDICINE

## 2022-01-06 PROCEDURE — 25000003 PHARM REV CODE 250: Performed by: PAIN MEDICINE

## 2022-01-06 RX ORDER — DEXAMETHASONE SODIUM PHOSPHATE 100 MG/10ML
INJECTION INTRAMUSCULAR; INTRAVENOUS
Status: DISCONTINUED | OUTPATIENT
Start: 2022-01-06 | End: 2022-01-06 | Stop reason: HOSPADM

## 2022-01-06 RX ORDER — LIDOCAINE HYDROCHLORIDE 10 MG/ML
INJECTION, SOLUTION EPIDURAL; INFILTRATION; INTRACAUDAL; PERINEURAL
Status: DISCONTINUED | OUTPATIENT
Start: 2022-01-06 | End: 2022-01-06 | Stop reason: HOSPADM

## 2022-01-06 RX ORDER — BUPIVACAINE HYDROCHLORIDE 2.5 MG/ML
INJECTION, SOLUTION EPIDURAL; INFILTRATION; INTRACAUDAL
Status: DISCONTINUED | OUTPATIENT
Start: 2022-01-06 | End: 2022-01-06 | Stop reason: HOSPADM

## 2022-01-06 RX ORDER — LIDOCAINE HYDROCHLORIDE 20 MG/ML
INJECTION, SOLUTION EPIDURAL; INFILTRATION; INTRACAUDAL; PERINEURAL
Status: DISCONTINUED | OUTPATIENT
Start: 2022-01-06 | End: 2022-01-06 | Stop reason: HOSPADM

## 2022-01-06 RX ORDER — SODIUM BICARBONATE 1 MEQ/ML
SYRINGE (ML) INTRAVENOUS
Status: DISCONTINUED | OUTPATIENT
Start: 2022-01-06 | End: 2022-01-06 | Stop reason: HOSPADM

## 2022-01-06 RX ORDER — SODIUM CHLORIDE 9 MG/ML
INJECTION, SOLUTION INTRAVENOUS CONTINUOUS
Status: DISCONTINUED | OUTPATIENT
Start: 2022-01-06 | End: 2022-01-06 | Stop reason: HOSPADM

## 2022-01-06 RX ORDER — LIDOCAINE HYDROCHLORIDE 10 MG/ML
1 INJECTION, SOLUTION EPIDURAL; INFILTRATION; INTRACAUDAL; PERINEURAL ONCE
Status: DISCONTINUED | OUTPATIENT
Start: 2022-01-06 | End: 2022-01-06 | Stop reason: HOSPADM

## 2022-01-06 RX ADMIN — SODIUM CHLORIDE: 0.9 INJECTION, SOLUTION INTRAVENOUS at 02:01

## 2022-01-06 RX ADMIN — SODIUM CHLORIDE: 0.9 INJECTION, SOLUTION INTRAVENOUS at 01:01

## 2022-01-06 NOTE — PLAN OF CARE
Safety precautions maintained. Bed locked and in lowest position. Instructed pt to call for assistance. Pt verbalizes understanding.

## 2022-01-06 NOTE — OP NOTE
"Procedure Note    Pre-operative Diagnosis: Neuropathic pain, lower extremity  Post-operative Diagnosis: Neuropathic pain, lower extremity  Procedure:  (1) Lumbar Sympathetic Block, bilateral    (2) Intraoperative fluoroscopy    Procedure Date: 01/06/2022  Anesthesia: None    Indications: To alleviate pain and suffering, and reduce functional impairment associated with neuropathic pain of the lower extremity.      The patients history and physical exam were reviewed. The risks (bleeding, intravascular injection, intrathecal or epidural injection, perforation of viscera, groin pain, genitofemoral nerve injury), benefits and alternatives to the procedure were discussed, and all questions were answered to the patients satisfaction. The patient agreed to proceed, and written informed consent was verified.    Procedure in Detail: Prior to transporting the patient to the procedure room, an IV fluid bolus of 500 mL was administered.  The patient was brought into the procedure room and placed in the prone position on the fluoroscopy table. The area of the lumbar spine was prepped with Chloraprep and draped in a sterile manner.  The L3 vertebral body was identified and the intensifier angled to square off the vertebral endplates at that level.  A 45 degree oblique view was obtained.  A point just below the right transverse process overlying the anterior margin of the vertebral body was marked on the skin.  This point and the tissues beneath it were anesthetized by infiltration with Lidocaine 2% 3-5 mL with a  1.25" 25G needle.  Next, a 22G 5" spine needle was inserted through the anesthetized skin and advanced toward the anterior edge of the L3 vertebral body under intermittent fluoroscopy.  Following contact with the vertebral body, lateral fluoroscopy images were used to incrementally advance the needle to the anterior margin.  Iodonated contrast was injected demonstrating local accumulation.  Bupivacaine 0.25% 7 mL + " Decadron 5 mg was administered in 2 mL increments over the next 10 minutes.    The procedure was then repeated on the left side.  Initial contrast administration demonstrated accumulation in the psoas muscle.  The needle was adjusted and contrast administration demonstrated accumulation in the target area.  Bupivacaine 0.25% 7 mL + Decadron 5 mg was administered in 2 mL increments over the next 10 minutes.    The needle was removed and a bandage applied to puncture site.    Disposition: The patient tolerated the procedure well, and there were no apparent complications. Vital signs remained stable throughout the procedure. The patient was taken to the recovery area where written discharge instructions for the procedure were given.     Follow-up: RTC as scheduled      Souleymane Rizo Jr, MD  Interventional Pain Medicine / Anesthesiology

## 2022-01-06 NOTE — DISCHARGE SUMMARY
[facility]  Discharge Note  Short Stay     Admit Date: 1/6/2022    Discharge Date: 1/6/2022     Attending Physician: Souleymane Rizo Jr, MD    Diagnoses:  There are no hospital problems to display for this patient.    Discharged Condition: Good     Hospital Course: Patient was admitted for an outpatient interventional pain management procedure and tolerated the procedure well with no complications.     Final Diagnoses: Same as principal problem.     Disposition: Home or Self Care     Follow up/Patient Instructions:     Follow-up Information     Souleymane Rizo Jr, MD. Go in 2 weeks.    Specialty: Pain Medicine  Why: Post-procedural Follow Up As Scheduled  Contact information:  [address]  [address]  [place] [address]  [phone]                         Reconciled Medications:     Medication List      CONTINUE taking these medications    ACCU-CHEK COMPACT PLUS TEST Strp  Generic drug: blood sugar diagnostic, drum     ACCU-CHEK SOFTCLIX LANCETS Misc  Generic drug: lancets  Inject 1 lancet as directed 2 (two) times a day.     alpha lipoic acid 600 mg Cap     atorvastatin 40 MG tablet  Commonly known as: LIPITOR  TAKE 1 TABLET BY MOUTH ONCE DAILY     blood sugar diagnostic Strp  Commonly known as: TRUE METRIX GLUCOSE TEST STRIP  1 strip by Misc.(Non-Drug; Combo Route) route 2 (two) times a day.     blood-glucose meter kit  Use to test twice a day     CALTRATE WITH VITAMIN D3 ORAL  Take by mouth.     * CINNAMON ORAL  Take by mouth.     * CINNAMON ORAL  Take by mouth.     coenzyme Q10 100 mg capsule     dicyclomine 10 MG capsule  Commonly known as: BENTYL     DULoxetine 30 MG capsule  Commonly known as: CYMBALTA  Take 1 capsule (30 mg total) by mouth once daily.     finasteride 5 mg tablet  Commonly known as: PROSCAR     flaxseed oiL 1,000 mg Cap  once a day     gluc-condr-om3-dha-epa-fish-st 929-479-03-54 mg Cap  once a day     glucosamine HCl 1,500 mg Tab  Take 1,500 mg by mouth.     KRILL OIL  ORAL  Take by mouth.     meclizine 25 mg tablet  Commonly known as: ANTIVERT  Take 1 tablet (25 mg total) by mouth 3 (three) times daily as needed for Dizziness.     metFORMIN 500 MG tablet  Commonly known as: GLUCOPHAGE  TAKE ONE TABLET BY MOUTH TWICE A DAY WITH MEALS     MILK THISTLE ORAL  Take by mouth.     pantoprazole 40 MG tablet  Commonly known as: PROTONIX  TAKE 1 TABLET BY MOUTH ONCE DAILY     pregabalin 25 MG capsule  Commonly known as: LYRICA  Take 1 capsule (25 mg total) by mouth 2 (two) times daily.     temazepam 30 mg capsule  Commonly known as: RESTORIL  TAKE ONE CAPSULE BY MOUTH NIGHTLY AT BEDTIME AS NEEDED FOR INSOMNIA     triamcinolone acetonide 0.1% 0.1 % ointment  Commonly known as: KENALOG  Apply topically 2 (two) times daily as needed (rash).     triamterene-hydrochlorothiazide 37.5-25 mg 37.5-25 mg per tablet  Commonly known as: MAXZIDE-25     vit C,R-Dq-epjzn-lutein-zeaxan 250-90-40-1 mg Cap  Commonly known as: PRESERVISION AREDS 2     vitamins  A,C,E-zinc-copper [phone] unit-mg-unit Cap  Take 1 tablet by mouth once daily.         * This list has 2 medication(s) that are the same as other medications prescribed for you. Read the directions carefully, and ask your doctor or other care provider to review them with you.               Discharge Procedure Orders (must include Diet, Follow-up, Activity)   Diet Adult Regular     No driving until:   Order Comments: If you received sedation, no driving for 12 hrs     Remove dressing in 24 hours     Notify your health care provider if you experience any of the following:  temperature >100.4     Notify your health care provider if you experience any of the following:  severe uncontrolled pain     Notify your health care provider if you experience any of the following:  redness, tenderness, or signs of infection (pain, swelling, redness, odor or green/yellow discharge around incision site)     Notify your health care provider if you experience any  of the following:  difficulty breathing or increased cough     Notify your health care provider if you experience any of the following:  severe persistent headache     Notify your health care provider if you experience any of the following:  increased confusion or weakness     Activity as tolerated       Souleymane Rizo Jr, MD  Interventional Pain Medicine / Anesthesiology

## 2022-01-06 NOTE — INTERVAL H&P NOTE
No significant changes were noted from the H&P or last clinic note.    The risks and benefits of this intervention, and alternative therapies were discussed with the patient.  The discussion of risks included infection, bleeding, need for additional procedures or surgery, nerve damage, paralysis, adverse medication reaction(s), stroke, and/or death.  Questions regarding the procedure, risks, expected outcome, and possible side effects were solicited and answered to the patient's satisfaction.  Raisa Green wishes to proceed with the injection or procedure.  Written consent was obtained.    Souleymane Rizo Jr, MD  Interventional Pain Medicine / Anesthesiology

## 2022-01-07 LAB — POCT GLUCOSE: 184 MG/DL (ref 70–110)

## 2022-01-07 RX ORDER — VENLAFAXINE HYDROCHLORIDE 75 MG/1
CAPSULE, EXTENDED RELEASE ORAL
Qty: 90 CAPSULE | Refills: 3 | Status: SHIPPED | OUTPATIENT
Start: 2022-01-07 | End: 2022-05-13

## 2022-01-07 NOTE — TELEPHONE ENCOUNTER
No new care gaps identified.  Powered by [employer]. Reference number: [id number].   1/07/2022 11:30:20 AM CST

## 2022-01-10 ENCOUNTER — PATIENT MESSAGE (OUTPATIENT)
Dept: ADMINISTRATIVE | Facility: HOSPITAL | Age: 72
End: 2022-01-10
Payer: MEDICARE

## 2022-01-11 ENCOUNTER — TELEPHONE (OUTPATIENT)
Dept: PODIATRY | Facility: CLINIC | Age: 72
End: 2022-01-11
Payer: MEDICARE

## 2022-01-11 NOTE — TELEPHONE ENCOUNTER
LMOM that appt with Dr Bryan has been cancelled to please call back to reschedule or schedule on my chart.

## 2022-01-20 ENCOUNTER — PATIENT MESSAGE (OUTPATIENT)
Dept: FAMILY MEDICINE | Facility: CLINIC | Age: 72
End: 2022-01-20
Payer: MEDICARE

## 2022-01-24 ENCOUNTER — PATIENT MESSAGE (OUTPATIENT)
Dept: FAMILY MEDICINE | Facility: CLINIC | Age: 72
End: 2022-01-24
Payer: MEDICARE

## 2022-02-02 ENCOUNTER — PATIENT MESSAGE (OUTPATIENT)
Dept: FAMILY MEDICINE | Facility: CLINIC | Age: 72
End: 2022-02-02
Payer: MEDICARE

## 2022-02-04 ENCOUNTER — PATIENT MESSAGE (OUTPATIENT)
Dept: INTERNAL MEDICINE | Facility: CLINIC | Age: 72
End: 2022-02-04
Payer: MEDICARE

## 2022-02-04 DIAGNOSIS — E11.42 TYPE 2 DIABETES MELLITUS WITH DIABETIC POLYNEUROPATHY, WITHOUT LONG-TERM CURRENT USE OF INSULIN: Primary | ICD-10-CM

## 2022-02-04 RX ORDER — LANCETS
1 EACH MISCELLANEOUS DAILY
Qty: 200 EACH | Refills: 3 | Status: ON HOLD | OUTPATIENT
Start: 2022-02-04 | End: 2023-03-06 | Stop reason: SDUPTHER

## 2022-02-04 NOTE — TELEPHONE ENCOUNTER
Signed refill for lancets.  Continue metformin at increased dose.  Focus on low carb, low sugar diet and exercise.

## 2022-02-07 ENCOUNTER — OFFICE VISIT (OUTPATIENT)
Dept: PODIATRY | Facility: CLINIC | Age: 72
End: 2022-02-07
Payer: MEDICARE

## 2022-02-07 VITALS — WEIGHT: 132.06 LBS | HEIGHT: 65 IN | BODY MASS INDEX: 22 KG/M2

## 2022-02-07 DIAGNOSIS — L84 CORN OR CALLUS: ICD-10-CM

## 2022-02-07 DIAGNOSIS — M20.41 HAMMER TOES OF BOTH FEET: ICD-10-CM

## 2022-02-07 DIAGNOSIS — E11.42 DIABETIC POLYNEUROPATHY ASSOCIATED WITH TYPE 2 DIABETES MELLITUS: Primary | ICD-10-CM

## 2022-02-07 DIAGNOSIS — M20.42 HAMMER TOES OF BOTH FEET: ICD-10-CM

## 2022-02-07 DIAGNOSIS — B35.3 TINEA PEDIS OF BOTH FEET: ICD-10-CM

## 2022-02-07 DIAGNOSIS — B35.1 ONYCHOMYCOSIS DUE TO DERMATOPHYTE: ICD-10-CM

## 2022-02-07 PROCEDURE — 3008F PR BODY MASS INDEX (BMI) DOCUMENTED: ICD-10-PCS | Mod: CPTII,S$GLB,, | Performed by: PODIATRIST

## 2022-02-07 PROCEDURE — 11056 PR TRIM BENIGN HYPERKERATOTIC SKIN LESION,2-4: ICD-10-PCS | Mod: Q9,S$GLB,, | Performed by: PODIATRIST

## 2022-02-07 PROCEDURE — 99213 OFFICE O/P EST LOW 20 MIN: CPT | Mod: 25,S$GLB,, | Performed by: PODIATRIST

## 2022-02-07 PROCEDURE — 1101F PT FALLS ASSESS-DOCD LE1/YR: CPT | Mod: CPTII,S$GLB,, | Performed by: PODIATRIST

## 2022-02-07 PROCEDURE — 3008F BODY MASS INDEX DOCD: CPT | Mod: CPTII,S$GLB,, | Performed by: PODIATRIST

## 2022-02-07 PROCEDURE — 1126F AMNT PAIN NOTED NONE PRSNT: CPT | Mod: CPTII,S$GLB,, | Performed by: PODIATRIST

## 2022-02-07 PROCEDURE — 11056 PARNG/CUTG B9 HYPRKR LES 2-4: CPT | Mod: Q9,S$GLB,, | Performed by: PODIATRIST

## 2022-02-07 PROCEDURE — 3288F PR FALLS RISK ASSESSMENT DOCUMENTED: ICD-10-PCS | Mod: CPTII,S$GLB,, | Performed by: PODIATRIST

## 2022-02-07 PROCEDURE — 99999 PR PBB SHADOW E&M-EST. PATIENT-LVL III: CPT | Mod: PBBFAC,,, | Performed by: PODIATRIST

## 2022-02-07 PROCEDURE — 99213 PR OFFICE/OUTPT VISIT, EST, LEVL III, 20-29 MIN: ICD-10-PCS | Mod: 25,S$GLB,, | Performed by: PODIATRIST

## 2022-02-07 PROCEDURE — 99999 PR PBB SHADOW E&M-EST. PATIENT-LVL III: ICD-10-PCS | Mod: PBBFAC,,, | Performed by: PODIATRIST

## 2022-02-07 PROCEDURE — 1159F MED LIST DOCD IN RCRD: CPT | Mod: CPTII,S$GLB,, | Performed by: PODIATRIST

## 2022-02-07 PROCEDURE — 11721 DEBRIDE NAIL 6 OR MORE: CPT | Mod: 59,Q9,S$GLB, | Performed by: PODIATRIST

## 2022-02-07 PROCEDURE — 1101F PR PT FALLS ASSESS DOC 0-1 FALLS W/OUT INJ PAST YR: ICD-10-PCS | Mod: CPTII,S$GLB,, | Performed by: PODIATRIST

## 2022-02-07 PROCEDURE — 1126F PR PAIN SEVERITY QUANTIFIED, NO PAIN PRESENT: ICD-10-PCS | Mod: CPTII,S$GLB,, | Performed by: PODIATRIST

## 2022-02-07 PROCEDURE — 11721 PR DEBRIDEMENT OF NAILS, 6 OR MORE: ICD-10-PCS | Mod: 59,Q9,S$GLB, | Performed by: PODIATRIST

## 2022-02-07 PROCEDURE — 3288F FALL RISK ASSESSMENT DOCD: CPT | Mod: CPTII,S$GLB,, | Performed by: PODIATRIST

## 2022-02-07 PROCEDURE — 1159F PR MEDICATION LIST DOCUMENTED IN MEDICAL RECORD: ICD-10-PCS | Mod: CPTII,S$GLB,, | Performed by: PODIATRIST

## 2022-02-07 RX ORDER — ECONAZOLE NITRATE 10 MG/G
CREAM TOPICAL 2 TIMES DAILY
Qty: 30 G | Refills: 2 | Status: ON HOLD | OUTPATIENT
Start: 2022-02-07 | End: 2024-02-06 | Stop reason: HOSPADM

## 2022-02-07 NOTE — PATIENT INSTRUCTIONS
Regular moisturizer: Aquaphor, Gold Bond diabetic cream, or Cereve    For calluses: Amlactin    Apply the athlete's foot cream to both feet twice daily for two weeks.  Also, treat your shoes as we discussed.

## 2022-02-08 NOTE — PROGRESS NOTES
"Subjective:      Patient ID: Lily Green is a 71 y.o. female.    Chief Complaint: Diabetes Mellitus and Diabetic Foot Exam ([facility] 10/21)    Lily Kline is a 71 y.o. female who presents to the clinic for evaluation and treatment of diabetic feet. Lily Kline has a past medical history of Anxiety, Celiac disease (05/2018), Celiac disease, Depression, Diabetes mellitus, Family history of breast cancer in mother, Hyperlipidemia, Hypertension, Meniere disease, Menopause, Peptic ulcer, Reflux esophagitis, Urinary tract infection, Vaginal infection, and Vaginal Pap smear (04/07/2014). Relates having toenails and the usual callus build up that is in need of trimming.  Denies being painful with wearing shoe gear.  Has maintain on her own since the last exam.  Relates neuropathy pain has been better controlled since seeing pain management.  Notes receiving a steroid injection in the "hip" with subsequent relief in the peripheral extremities.  Relates having dry, flaky skin to the plantar surface of bilateral foot.  She has used a multitude of moisturizers with no resolution in symptoms.  Denies this being a source of pruritus.  Inquires as to how this can be resolved.   Continues to note excellent control over her blood glucose.   Denies any additional pedal complaints.    PCP: Pavel Calixto MD    Date Last Seen by PCP: 10/21  Hemoglobin A1C   Date Value Ref Range Status   10/19/2021 6.2 (H) 4.0 - 5.6 % Final     Comment:     ADA Screening Guidelines:  5.7-6.4%  Consistent with prediabetes  >or=6.5%  Consistent with diabetes    High levels of fetal hemoglobin interfere with the HbA1C  assay. Heterozygous hemoglobin variants (HbS, HgC, etc)do  not significantly interfere with this assay.   However, presence of multiple variants may affect accuracy.     04/20/2021 6.1 (H) 4.0 - 5.6 % Final     Comment:     ADA Screening Guidelines:  5.7-6.4%  Consistent with prediabetes  >or=6.5%  Consistent with diabetes    High " levels of fetal hemoglobin interfere with the HbA1C  assay. Heterozygous hemoglobin variants (HbS, HgC, etc)do  not significantly interfere with this assay.   However, presence of multiple variants may affect accuracy.     10/20/2020 6.0 (H) 4.0 - 5.6 % Final     Comment:     ADA Screening Guidelines:  5.7-6.4%  Consistent with prediabetes  >or=6.5%  Consistent with diabetes  High levels of fetal hemoglobin interfere with the HbA1C  assay. Heterozygous hemoglobin variants (HbS, HgC, etc)do  not significantly interfere with this assay.   However, presence of multiple variants may affect accuracy.     10/23/2019 6.8 % Final         Past Medical History:   Diagnosis Date    Anxiety     Celiac disease 2018    Celiac disease     Depression     Diabetes mellitus     Family history of breast cancer in mother      at age 68    Hyperlipidemia     Hypertension     Meniere disease     Menopause     Peptic ulcer     Reflux esophagitis     Urinary tract infection     Vaginal infection     Vaginal Pap smear 2014    Pap/Hpv Negative        Past Surgical History:   Procedure Laterality Date    APPENDECTOMY      BREAST SURGERY      Tags    CARPAL TUNNEL RELEASE Bilateral 2017    ,     COLONOSCOPY  2018    Normal  (Cornell Velazco)     DILATION AND CURETTAGE OF UTERUS  1986    DUPUYTREN CONTRACTURE RELEASE Bilateral 2017    HYSTERECTOMY  1987    BEAU w/ appy, no BSO     INJECTION OF NEUROLYTIC AGENT AROUND LUMBAR SYMPATHETIC NERVE N/A 2022    Procedure: BLOCK, LUMBAR SYMPATHETIC;  Surgeon: Souleymane Rizo Jr., MD;  Location: [facility];  Service: Pain Management;  Laterality: N/A;  no pacemaker   pt is diabetic     SHOULDER SURGERY   &     right rotator cuff    TONSILLECTOMY      UPPER GASTROINTESTINAL ENDOSCOPY  2018       Family History   Problem Relation Age of Onset    Vision loss Father     Breast cancer Mother     Cancer Mother     Vision loss Mother      Hyperlipidemia Sister     Breast cancer Paternal Aunt     Cancer Paternal Aunt     Diabetes Paternal Grandfather     Vision loss Sister     Kidney disease Neg Hx        Social History     Socioeconomic History    Marital status:    Tobacco Use    Smoking status: Never Smoker    Smokeless tobacco: Never Used   Substance and Sexual Activity    Alcohol use: No    Drug use: Never    Sexual activity: Not Currently     Partners: Male     Birth control/protection: See Surgical Hx     Comment: :      Social Determinants of Health     Financial Resource Strain: Low Risk     Difficulty of Paying Living Expenses: Not hard at all   Food Insecurity: No Food Insecurity    Worried About Running Out of Food in the Last Year: Never true    Ran Out of Food in the Last Year: Never true   Transportation Needs: No Transportation Needs    Lack of Transportation (Medical): No    Lack of Transportation (Non-Medical): No   Physical Activity: Sufficiently Active    Days of Exercise per Week: 6 days    Minutes of Exercise per Session: 30 min   Stress: Stress Concern Present    Feeling of Stress : To some extent   Social Connections: Unknown    Frequency of Communication with Friends and Family: More than three times a week    Frequency of Social Gatherings with Friends and Family: More than three times a week    Active Member of Clubs or Organizations: Yes    Attends Club or Organization Meetings: More than 4 times per year    Marital Status:    Housing Stability: High Risk    Unable to Pay for Housing in the Last Year: No    Number of Places Lived in the Last Year: 3    Unstable Housing in the Last Year: No       Current Outpatient Medications   Medication Sig Dispense Refill    alpha lipoic acid 600 mg Cap       atorvastatin (LIPITOR) 40 MG tablet TAKE 1 TABLET BY MOUTH ONCE DAILY 90 tablet 3    blood sugar diagnostic (TRUE METRIX GLUCOSE TEST STRIP) Strp 1 strip by Misc.(Non-Drug;  Combo Route) route 2 (two) times a day. 200 each 3    blood-glucose meter kit Use to test twice a day 1 each 0    calcium carbonate/vitamin D3 (CALTRATE WITH VITAMIN D3 ORAL) Take by mouth.      cinnamon bark (CINNAMON ORAL) Take by mouth.      coenzyme Q10 100 mg capsule       dicyclomine (BENTYL) 10 MG capsule       DULoxetine (CYMBALTA) 30 MG capsule Take 1 capsule (30 mg total) by mouth once daily. 30 capsule 11    finasteride (PROSCAR) 5 mg tablet       flaxseed oil 1,000 mg Cap once a day      gluc-condr-om3-dha-epa-fish-st 710-597-26-54 mg Cap once a day      glucosamine HCl 1,500 mg Tab Take 1,500 mg by mouth.      KRILL OIL ORAL Take by mouth.      lancets Misc 1 lancet by Misc.(Non-Drug; Combo Route) route once daily. 200 each 3    meclizine (ANTIVERT) 25 mg tablet Take 1 tablet (25 mg total) by mouth 3 (three) times daily as needed for Dizziness. 20 tablet 0    metFORMIN (GLUCOPHAGE) 500 MG tablet TAKE ONE TABLET BY MOUTH TWICE A DAY WITH MEALS 180 tablet 3    MILK THISTLE ORAL Take by mouth.      pantoprazole (PROTONIX) 40 MG tablet TAKE 1 TABLET BY MOUTH ONCE DAILY  90 tablet 3    pregabalin (LYRICA) 25 MG capsule Take 1 capsule (25 mg total) by mouth 2 (two) times daily. 60 capsule 6    temazepam (RESTORIL) 30 mg capsule TAKE ONE CAPSULE BY MOUTH NIGHTLY AT BEDTIME AS NEEDED FOR INSOMNIA 30 capsule 2    triamcinolone acetonide 0.1% (KENALOG) 0.1 % ointment Apply topically 2 (two) times daily as needed (rash). 80 g 11    triamterene-hydrochlorothiazide 37.5-25 mg (MAXZIDE-25) 37.5-25 mg per tablet       vit C,B-Ps-sidaj-lutein-zeaxan (PRESERVISION AREDS 2) 011-899-00-1 mg-unit-mg-mg Cap       vitamins  A,C,E-zinc-copper 14,320-226-200 unit-mg-unit Cap Take 1 tablet by mouth once daily.      econazole nitrate 1 % cream Apply topically 2 (two) times daily. 30 g 2    venlafaxine (EFFEXOR-XR) 75 MG 24 hr capsule TAKE 1 CAPSULE BY MOUTH ONCE DAILY 90 capsule 3     No current  facility-administered medications for this visit.       Review of patient's allergies indicates:   Allergen Reactions    Crestor [rosuvastatin] Swelling    Gluten protein          Review of Systems   Constitutional: Negative for chills and fever.   Cardiovascular: Negative for claudication and leg swelling.   Skin: Positive for color change, nail changes and suspicious lesions.   Musculoskeletal: Positive for joint pain. Negative for joint swelling, muscle cramps and muscle weakness.   Neurological: Positive for numbness and paresthesias.   Psychiatric/Behavioral: Negative for altered mental status.           Objective:      Physical Exam  Constitutional:       General: She is not in acute distress.     Appearance: She is well-developed. She is not diaphoretic.   Cardiovascular:      Pulses:           Dorsalis pedis pulses are 2+ on the right side and 2+ on the left side.        Posterior tibial pulses are 1+ on the right side and 1+ on the left side.      Comments: CFT is < 3 seconds bilateral.  Pedal hair growth is decreased bilateral.  Mild non pitting lower extremity edema noted bilateral.  Toes are cool to touch bilateral.  Musculoskeletal:         General: Deformity present. No tenderness.      Comments: Muscle strength 5/5 in all muscle groups bilateral.   (-) for pain with palpation of bilateral foot and ankle. Semi-reducible contracture of toes 2-5 bilateral with adductovarus rotation of the 5th.   Skin:     General: Skin is warm and dry.      Capillary Refill: Capillary refill takes less than 2 seconds.      Coloration: Skin is not pale.      Findings: Lesion present. No abrasion, bruising, burn, ecchymosis, erythema, petechiae or rash.      Comments: Pedal skin appears thin bilateral.  Toenails x 10 appear thickened by 2 mm, elongated by 4 mm, and discolored with subungual debris.  Light callus build up noted to bilateral plantar 5th toe.  Dry, flaky skin noted to bilateral foot in a moccasin  distribution.  No break noted in adjacent skin integrity.   Neurological:      Mental Status: She is alert and oriented to person, place, and time.      Sensory: Sensory deficit present.      Motor: No weakness or atrophy.      Comments: Protective sensation per [place] monofilament is decreased bilateral.  Vibratory sensation is decreased bilateral.  Light touch is intact bilateral.               Assessment:       Encounter Diagnoses   Name Primary?    Diabetic polyneuropathy associated with type 2 diabetes mellitus Yes    Tinea pedis of both feet     Hammer toes of both feet     Onychomycosis due to dermatophyte     Corn or callus          Plan:       Lily Kline was seen today for diabetes mellitus and diabetic foot exam.    Diagnoses and all orders for this visit:    Diabetic polyneuropathy associated with type 2 diabetes mellitus    Tinea pedis of both feet  -     econazole nitrate 1 % cream; Apply topically 2 (two) times daily.    Hammer toes of both feet    Onychomycosis due to dermatophyte    Corn or callus      I counseled the patient on her conditions, their implications and medical management.    To continue wearing shoe gear that minimizes pressure to the digital deformities.    Rx written for econazole nitrate to apply to bilateral foot BID x 2 weeks.    Discussed regularly treating shoe gear to minimize re-infection.    Given information regarding Amlactin.    Shoe inspection. Diabetic Foot Education. Patient reminded of the importance of good nutrition and blood sugar control to help prevent podiatric complications of diabetes. Patient instructed on proper foot hygeine. We discussed wearing proper shoe gear, daily foot inspections, never walking without protective shoe gear, never putting sharp instruments to feet    With patient's permission, nails were aggressively reduced and debrided x 10 to their soft tissue attachment mechanically and with electric , removing all offending  nail and debris.  Also, a sterile #15 scalpel was used to trim lesions x 2 down to smooth appearing skin without incident.  Patient relates relief following the procedure. She will continue to monitor the areas daily, inspect her feet, wear protective shoe gear when ambulatory, moisturizer to maintain skin integrity and follow in this office in approximately 3 months, sooner p.r.n.    Follow up in about 3 months (around 5/7/2022).    Lucas Bryan DPM

## 2022-02-14 ENCOUNTER — OFFICE VISIT (OUTPATIENT)
Dept: URGENT CARE | Facility: CLINIC | Age: 72
End: 2022-02-14
Payer: MEDICARE

## 2022-02-14 VITALS
OXYGEN SATURATION: 98 % | RESPIRATION RATE: 16 BRPM | SYSTOLIC BLOOD PRESSURE: 115 MMHG | HEART RATE: 96 BPM | TEMPERATURE: 99 F | HEIGHT: 65 IN | DIASTOLIC BLOOD PRESSURE: 81 MMHG | WEIGHT: 132 LBS | BODY MASS INDEX: 21.99 KG/M2

## 2022-02-14 DIAGNOSIS — J06.9 VIRAL URI: ICD-10-CM

## 2022-02-14 DIAGNOSIS — R50.9 FEVER, UNSPECIFIED FEVER CAUSE: Primary | ICD-10-CM

## 2022-02-14 DIAGNOSIS — R09.81 NASAL CONGESTION: ICD-10-CM

## 2022-02-14 LAB
CTP QC/QA: YES
CTP QC/QA: YES
POC MOLECULAR INFLUENZA A AGN: NEGATIVE
POC MOLECULAR INFLUENZA B AGN: NEGATIVE
SARS-COV-2 RDRP RESP QL NAA+PROBE: NEGATIVE

## 2022-02-14 PROCEDURE — 1160F PR REVIEW ALL MEDS BY PRESCRIBER/CLIN PHARMACIST DOCUMENTED: ICD-10-PCS | Mod: CPTII,S$GLB,,

## 2022-02-14 PROCEDURE — 3008F PR BODY MASS INDEX (BMI) DOCUMENTED: ICD-10-PCS | Mod: CPTII,S$GLB,,

## 2022-02-14 PROCEDURE — 1160F RVW MEDS BY RX/DR IN RCRD: CPT | Mod: CPTII,S$GLB,,

## 2022-02-14 PROCEDURE — 99213 OFFICE O/P EST LOW 20 MIN: CPT | Mod: S$GLB,,,

## 2022-02-14 PROCEDURE — 87502 INFLUENZA DNA AMP PROBE: CPT | Mod: QW,S$GLB,,

## 2022-02-14 PROCEDURE — 3079F DIAST BP 80-89 MM HG: CPT | Mod: CPTII,S$GLB,,

## 2022-02-14 PROCEDURE — 1159F PR MEDICATION LIST DOCUMENTED IN MEDICAL RECORD: ICD-10-PCS | Mod: CPTII,S$GLB,,

## 2022-02-14 PROCEDURE — 1159F MED LIST DOCD IN RCRD: CPT | Mod: CPTII,S$GLB,,

## 2022-02-14 PROCEDURE — 3074F SYST BP LT 130 MM HG: CPT | Mod: CPTII,S$GLB,,

## 2022-02-14 PROCEDURE — 3074F PR MOST RECENT SYSTOLIC BLOOD PRESSURE < 130 MM HG: ICD-10-PCS | Mod: CPTII,S$GLB,,

## 2022-02-14 PROCEDURE — 87502 POCT INFLUENZA A/B MOLECULAR: ICD-10-PCS | Mod: QW,S$GLB,,

## 2022-02-14 PROCEDURE — 3079F PR MOST RECENT DIASTOLIC BLOOD PRESSURE 80-89 MM HG: ICD-10-PCS | Mod: CPTII,S$GLB,,

## 2022-02-14 PROCEDURE — 99213 PR OFFICE/OUTPT VISIT, EST, LEVL III, 20-29 MIN: ICD-10-PCS | Mod: S$GLB,,,

## 2022-02-14 PROCEDURE — U0002 COVID-19 LAB TEST NON-CDC: HCPCS | Mod: QW,S$GLB,,

## 2022-02-14 PROCEDURE — 3008F BODY MASS INDEX DOCD: CPT | Mod: CPTII,S$GLB,,

## 2022-02-14 PROCEDURE — U0002: ICD-10-PCS | Mod: QW,S$GLB,,

## 2022-02-14 RX ORDER — FLUTICASONE PROPIONATE 50 MCG
1 SPRAY, SUSPENSION (ML) NASAL DAILY
Qty: 9.9 ML | Refills: 0 | Status: SHIPPED | OUTPATIENT
Start: 2022-02-14 | End: 2022-03-16

## 2022-02-14 RX ORDER — GUAIFENESIN 600 MG/1
1200 TABLET, EXTENDED RELEASE ORAL 2 TIMES DAILY
Qty: 28 TABLET | Refills: 0 | Status: SHIPPED | OUTPATIENT
Start: 2022-02-14 | End: 2022-02-21

## 2022-02-14 NOTE — PATIENT INSTRUCTIONS
- Rest.    - Drink plenty of fluids.    - Acetaminophen (tylenol) or Ibuprofen (advil,motrin) as directed as needed for fever/pain. Avoid tylenol if you have a history of liver disease. Do not take ibuprofen if you have a history of GI bleeding, kidney disease, or if you take blood thinners.     - You must understand that you have received an Urgent Care treatment only and that you may be released before all of your medical problems are known or treated.   - You, the patient, will arrange for follow up care as instructed.   - If your condition worsens or fails to improve we recommend that you receive another evaluation at the ER immediately or contact your PCP to discuss your concerns or return here.   - Follow up with your PCP or specialty clinic as directed in the next 1-2 weeks if not improved or as needed.  You can call [phone] to schedule an appointment with the appropriate provider.      If your symptoms do not improve or worsen, go to the emergency room immediately.  Patient Education       Cough, Runny Nose, and the Common Cold Discharge Instructions   About this topic   The common cold is an infection in the nose and throat. A tiny germ, called a virus, causes this infection. It often affects your nose, throat, ears, and sinuses. A cold can easily spread from person to person. Coughing, sneezing, or touching something with the germ on it spreads the cold.  Most colds go away on their own without treatment. Antibiotics will not help a cold. Your doctor may order drugs to help with the signs of a cold. Even though you may feel very sick, the common cold is not a health emergency.         What care is needed at home?   · Ask your doctor what you need to do when you go home. Make sure you ask questions if you do not understand what the doctor says.  · To help you feel better:  ? Use a cool mist humidifier to avoid breathing dry air.  ? Use hard candy or cough drops to soothe sore throat and  cough.  ? Gargle with salt water. Mix 1/2 teaspoon (2.5 grams) salt with a cup (240 mL) of warm water a few times a day.  ? Spray saltwater mist in each nostril. Any normal saline spray works.  ? Sip warm liquids to keep your throat moist.  ? Take warm, steamy showers to help soothe the cough.  · Do not smoke or be in smoke-filled places. Avoid things that may cause breathing problems like vaping, fumes, pollution, dust, and other common allergens.  · You may want to use over-the-counter medicines for allergies or acid reflux if your cough is due to one of these problems.  · You can also use an over-the-counter cough medicine.  · Drink plenty of fluids whenever you are thirsty.  What follow-up care is needed?   Your doctor may ask you to make visits to the office to check on your progress. Be sure to keep these visits.  What drugs may be needed?   Your doctor may order drugs to:  · Help a stuffy nose  · Lower a fever  · Help with pain  · Treat an allergy  · Help with cough  Always talk to your doctor before you take any drugs. This includes over-the-counter (OTC) drugs and herbal supplements. This is very important if you have high blood pressure.  Will physical activity be limited?   Get lots of rest. Sleep when you are feeling tired. Avoid doing tiring activities.  What changes to diet are needed?   · Chicken soup may be helpful. Warm fluids help thin mucus and help the body get rid of it easier.  What problems could happen?   · Colds may cause signs of asthma for people who have asthma.  · Sinus or ear infection  · Lung infections  · Bronchitis  What can be done to prevent this health problem?   · Wash your hands often with soap and water for at least 20 seconds, especially after coughing or sneezing. Alcohol-based hand sanitizers also work to kill the virus.  · If you are sick, cover your mouth and nose with tissue when you cough or sneeze. You can also cough into your elbow. Throw away tissues in the trash and  wash your hands after touching used tissues.  · Clean items and surfaces you most often touch like door handles, remotes, phones, or toys. Wipe them with a disinfectant. This can help reduce the spread of infection.  · Do not get too close (kissing, hugging) to people who are sick.  · Do not share towels or hankies with anyone who is sick.  · Stay away from crowded places.  · Keep your hands away from your eyes and nose because the virus can enter easily to those body areas.  · Get a flu shot each year.  When do I need to call the doctor?   · You have chest pain when you cough or trouble breathing.  · You start to cough up blood or yellow or green mucus.  · You have a fever of 100.4°F (38°C) or higher or chills.  · You cough so hard you throw up.  · You are still coughing in 10 days.  Helpful tips   · It is normal that mucus from your runny nose may become thicker and change color. Do not take an antibiotic. Instead, drink more water-based fluids, especially hot tea and soups.  · Most colds go away within a week. If you are still sick after 14 days, see your doctor.  Teach Back: Helping You Understand   The Teach Back Method helps you understand the information we are giving you. After you talk with the staff, tell them in your own words what you learned. This helps to make sure the staff has described each thing clearly. It also helps to explain things that may have been confusing. Before going home, make sure you can do these:  · I can tell you about my condition.  · I can tell you what may help ease my breathing.  · I can tell you what I can do to help avoid passing the infection to others.  · I can tell you what I will do if I have a fever, chills, or trouble breathing.  Where can I learn more?   American Academy of Family Physicians  https://familydoctor.org/condition/colds-and-the-flu/   American Lung  Association  http://www.lung.org/lung-health-and-diseases/lung-disease-lookup/influenza/facts-about-the-common-cold.html   Centers for Disease Control and Prevention  https://www.cdc.gov/features/rhinoviruses/   Kids Health  http://kidshealth.org/en/parents/cold.html?ref=search&WT.ac=hut-z-iuyc-dg-cbjtxq-npt   Last Reviewed Date   2021-06-09  Consumer Information Use and Disclaimer   This information is not specific medical advice and does not replace information you receive from your health care provider. This is only a brief summary of general information. It does NOT include all information about conditions, illnesses, injuries, tests, procedures, treatments, therapies, discharge instructions or life-style choices that may apply to you. You must talk with your health care provider for complete information about your health and treatment options. This information should not be used to decide whether or not to accept your health care providers advice, instructions or recommendations. Only your health care provider has the knowledge and training to provide advice that is right for you.  Copyright   Copyright © 2021 [employer] and its affiliates and/or licensors. All rights reserved.

## 2022-02-14 NOTE — PROGRESS NOTES
"Subjective:       Patient ID: Lily Green is a 71 y.o. female.    Vitals:  height is 5' 5" (1.651 m) and weight is 59.9 kg (132 lb). Her temperature is 99.2 °F (37.3 °C). Her blood pressure is 115/81 and her pulse is 96. Her respiration is 16 and oxygen saturation is 98%.     Chief Complaint: Fever    Pt presents chills, low grade fever, runny nose since today. Denies sick contacts. She is vaccinated against covid. Temp was 100.4 this morning. Denies taking anything for symptoms.     Fever   This is a new problem. The current episode started today. The problem occurs constantly. The problem has been unchanged. Her temperature was unmeasured prior to arrival. Associated symptoms include congestion. Pertinent negatives include no chest pain, coughing, diarrhea, ear pain, headaches, nausea, rash, sore throat or vomiting. She has tried nothing for the symptoms. The treatment provided no relief.       Constitution: Positive for fatigue and fever. Negative for chills and sweating.   HENT: Positive for congestion and postnasal drip. Negative for ear pain, sinus pain, sinus pressure and sore throat.    Neck: Negative for neck pain and neck stiffness.   Cardiovascular: Negative for chest trauma and chest pain.   Eyes: Negative for eye itching and eye pain.   Respiratory: Negative for cough and shortness of breath.    Gastrointestinal: Negative for nausea, vomiting, constipation and diarrhea.   Skin: Negative for color change, pale, rash and hives.   Allergic/Immunologic: Negative for hives, itching and sneezing.   Neurological: Negative for headaches, disorientation and altered mental status.   Psychiatric/Behavioral: Negative for altered mental status, disorientation and confusion.       Objective:      Physical Exam   Constitutional: She is oriented to person, place, and time. She appears well-developed and well-nourished. She is cooperative.  Non-toxic appearance. She does not have a sickly appearance. She " does not appear ill. No distress.      Comments:Patient sits comfortably in exam chair. Answers questions in complete sentences. Does not show any signs of distress or discoloration.      HENT:   Head: Normocephalic and atraumatic.   Ears:   Right Ear: Hearing, tympanic membrane, external ear and ear canal normal.   Left Ear: Hearing, tympanic membrane, external ear and ear canal normal.   Nose: Rhinorrhea present. No mucosal edema or nasal deformity. No epistaxis. Right sinus exhibits no maxillary sinus tenderness and no frontal sinus tenderness. Left sinus exhibits no maxillary sinus tenderness and no frontal sinus tenderness.   Mouth/Throat: Uvula is midline, oropharynx is clear and moist and mucous membranes are normal. No trismus in the jaw. Normal dentition. No uvula swelling. No oropharyngeal exudate, posterior oropharyngeal edema or posterior oropharyngeal erythema.   Eyes: Conjunctivae and lids are normal. No scleral icterus.   Neck: Trachea normal and phonation normal. Neck supple. No edema present. No erythema present. No neck rigidity present.   Cardiovascular: Normal rate, regular rhythm, normal heart sounds, intact distal pulses and normal pulses.   Pulmonary/Chest: Effort normal and breath sounds normal. No stridor. No respiratory distress. She has no decreased breath sounds. She has no wheezes. She has no rhonchi. She has no rales.   Abdominal: Normal appearance.   Musculoskeletal: Normal range of motion.         General: No deformity or edema. Normal range of motion.   Lymphadenopathy:     She has no cervical adenopathy.        Right cervical: No superficial cervical, no deep cervical and no posterior cervical adenopathy present.       Left cervical: No superficial cervical, no deep cervical and no posterior cervical adenopathy present.   Neurological: She is alert and oriented to person, place, and time. She exhibits normal muscle tone. Coordination normal.   Skin: Skin is warm, dry, intact, not  diaphoretic and not pale.   Psychiatric: She has a normal mood and affect. Her speech is normal and behavior is normal. Judgment and thought content normal. Cognition and memory  Nursing note and vitals reviewed.    Results for orders placed or performed in visit on 02/14/22   POCT COVID-19 Rapid Screening   Result Value Ref Range    POC Rapid COVID Negative Negative     Acceptable Yes    POCT Influenza A/B MOLECULAR   Result Value Ref Range    POC Molecular Influenza A Ag Negative Negative, Not Reported    POC Molecular Influenza B Ag Negative Negative, Not Reported     Acceptable Yes        3        Assessment:       1. Fever, unspecified fever cause    2. Nasal congestion    3. Viral URI          Plan:         Fever, unspecified fever cause  -     POCT COVID-19 Rapid Screening  -     POCT Influenza A/B MOLECULAR    Nasal congestion  -     guaiFENesin (MUCINEX) 600 mg 12 hr tablet; Take 2 tablets (1,200 mg total) by mouth 2 (two) times daily. for 7 days  Dispense: 28 tablet; Refill: 0  -     fluticasone propionate (FLONASE) 50 mcg/actuation nasal spray; 1 spray (50 mcg total) by Each Nostril route once daily.  Dispense: 9.9 mL; Refill: 0    Viral URI                  Patient Instructions   - Rest.    - Drink plenty of fluids.    - Acetaminophen (tylenol) or Ibuprofen (advil,motrin) as directed as needed for fever/pain. Avoid tylenol if you have a history of liver disease. Do not take ibuprofen if you have a history of GI bleeding, kidney disease, or if you take blood thinners.     - You must understand that you have received an Urgent Care treatment only and that you may be released before all of your medical problems are known or treated.   - You, the patient, will arrange for follow up care as instructed.   - If your condition worsens or fails to improve we recommend that you receive another evaluation at the ER immediately or contact your PCP to discuss your concerns or return  here.   - Follow up with your PCP or specialty clinic as directed in the next 1-2 weeks if not improved or as needed.  You can call [phone] to schedule an appointment with the appropriate provider.      If your symptoms do not improve or worsen, go to the emergency room immediately.  Patient Education       Cough, Runny Nose, and the Common Cold Discharge Instructions   About this topic   The common cold is an infection in the nose and throat. A tiny germ, called a virus, causes this infection. It often affects your nose, throat, ears, and sinuses. A cold can easily spread from person to person. Coughing, sneezing, or touching something with the germ on it spreads the cold.  Most colds go away on their own without treatment. Antibiotics will not help a cold. Your doctor may order drugs to help with the signs of a cold. Even though you may feel very sick, the common cold is not a health emergency.         What care is needed at home?   · Ask your doctor what you need to do when you go home. Make sure you ask questions if you do not understand what the doctor says.  · To help you feel better:  ? Use a cool mist humidifier to avoid breathing dry air.  ? Use hard candy or cough drops to soothe sore throat and cough.  ? Gargle with salt water. Mix 1/2 teaspoon (2.5 grams) salt with a cup (240 mL) of warm water a few times a day.  ? Spray saltwater mist in each nostril. Any normal saline spray works.  ? Sip warm liquids to keep your throat moist.  ? Take warm, steamy showers to help soothe the cough.  · Do not smoke or be in smoke-filled places. Avoid things that may cause breathing problems like vaping, fumes, pollution, dust, and other common allergens.  · You may want to use over-the-counter medicines for allergies or acid reflux if your cough is due to one of these problems.  · You can also use an over-the-counter cough medicine.  · Drink plenty of fluids whenever you are thirsty.  What follow-up care is  needed?   Your doctor may ask you to make visits to the office to check on your progress. Be sure to keep these visits.  What drugs may be needed?   Your doctor may order drugs to:  · Help a stuffy nose  · Lower a fever  · Help with pain  · Treat an allergy  · Help with cough  Always talk to your doctor before you take any drugs. This includes over-the-counter (OTC) drugs and herbal supplements. This is very important if you have high blood pressure.  Will physical activity be limited?   Get lots of rest. Sleep when you are feeling tired. Avoid doing tiring activities.  What changes to diet are needed?   · Chicken soup may be helpful. Warm fluids help thin mucus and help the body get rid of it easier.  What problems could happen?   · Colds may cause signs of asthma for people who have asthma.  · Sinus or ear infection  · Lung infections  · Bronchitis  What can be done to prevent this health problem?   · Wash your hands often with soap and water for at least 20 seconds, especially after coughing or sneezing. Alcohol-based hand sanitizers also work to kill the virus.  · If you are sick, cover your mouth and nose with tissue when you cough or sneeze. You can also cough into your elbow. Throw away tissues in the trash and wash your hands after touching used tissues.  · Clean items and surfaces you most often touch like door handles, remotes, phones, or toys. Wipe them with a disinfectant. This can help reduce the spread of infection.  · Do not get too close (kissing, hugging) to people who are sick.  · Do not share towels or hankies with anyone who is sick.  · Stay away from crowded places.  · Keep your hands away from your eyes and nose because the virus can enter easily to those body areas.  · Get a flu shot each year.  When do I need to call the doctor?   · You have chest pain when you cough or trouble breathing.  · You start to cough up blood or yellow or green mucus.  · You have a fever of 100.4°F (38°C) or higher  or chills.  · You cough so hard you throw up.  · You are still coughing in 10 days.  Helpful tips   · It is normal that mucus from your runny nose may become thicker and change color. Do not take an antibiotic. Instead, drink more water-based fluids, especially hot tea and soups.  · Most colds go away within a week. If you are still sick after 14 days, see your doctor.  Teach Back: Helping You Understand   The Teach Back Method helps you understand the information we are giving you. After you talk with the staff, tell them in your own words what you learned. This helps to make sure the staff has described each thing clearly. It also helps to explain things that may have been confusing. Before going home, make sure you can do these:  · I can tell you about my condition.  · I can tell you what may help ease my breathing.  · I can tell you what I can do to help avoid passing the infection to others.  · I can tell you what I will do if I have a fever, chills, or trouble breathing.  Where can I learn more?   American Academy of Family Physicians  https://familydoctor.org/condition/colds-and-the-flu/   American Lung Association  http://www.lung.org/lung-health-and-diseases/lung-disease-lookup/influenza/facts-about-the-common-cold.html   Centers for Disease Control and Prevention  https://www.cdc.gov/features/rhinoviruses/   Kids Health  http://kidshealth.org/en/parents/cold.html?ref=search&WT.ac=tor-r-cbxx-gx-ymnrng-fgv   Last Reviewed Date   2021-06-09  Consumer Information Use and Disclaimer   This information is not specific medical advice and does not replace information you receive from your health care provider. This is only a brief summary of general information. It does NOT include all information about conditions, illnesses, injuries, tests, procedures, treatments, therapies, discharge instructions or life-style choices that may apply to you. You must talk with your health care provider for complete information  about your health and treatment options. This information should not be used to decide whether or not to accept your health care providers advice, instructions or recommendations. Only your health care provider has the knowledge and training to provide advice that is right for you.  Copyright   Copyright © 2021 [employer] and its affiliates and/or licensors. All rights reserved.

## 2022-02-22 DIAGNOSIS — F51.01 PRIMARY INSOMNIA: ICD-10-CM

## 2022-02-22 RX ORDER — TEMAZEPAM 30 MG/1
CAPSULE ORAL
Qty: 30 CAPSULE | Refills: 2 | Status: SHIPPED | OUTPATIENT
Start: 2022-02-22 | End: 2022-05-30 | Stop reason: SDUPTHER

## 2022-02-22 NOTE — TELEPHONE ENCOUNTER
Care Due:                  Date            Visit Type   Department     Provider  --------------------------------------------------------------------------------                                EP -                              PRIMARY      DESC FAMILY  Last Visit: 10-      [facility]  Pavel Deleon                              ESTABLISHED                              PATIENT      [facility] PRIMARY  Next Visit: 04-      [facility]           Pavel Deleon                                                            Last  Test          Frequency    Reason                     Performed    Due Date  --------------------------------------------------------------------------------    HBA1C.......  6 months...  metFORMIN................  10-   04-    Powered by [employer]. Reference number: [id number].   2/22/2022 8:05:27 AM CST

## 2022-03-07 ENCOUNTER — PATIENT MESSAGE (OUTPATIENT)
Dept: INTERNAL MEDICINE | Facility: CLINIC | Age: 72
End: 2022-03-07
Payer: MEDICARE

## 2022-03-07 DIAGNOSIS — E11.42 TYPE 2 DIABETES MELLITUS WITH DIABETIC POLYNEUROPATHY, WITHOUT LONG-TERM CURRENT USE OF INSULIN: ICD-10-CM

## 2022-03-07 RX ORDER — METFORMIN HYDROCHLORIDE 500 MG/1
500 TABLET ORAL 3 TIMES DAILY
Qty: 270 TABLET | Refills: 3 | Status: ON HOLD | OUTPATIENT
Start: 2022-03-07 | End: 2023-02-16

## 2022-03-07 NOTE — TELEPHONE ENCOUNTER
No new care gaps identified.  Powered by [employer]. Reference number: [id number].   3/07/2022 1:26:25 PM CST

## 2022-04-06 ENCOUNTER — PATIENT MESSAGE (OUTPATIENT)
Dept: INTERNAL MEDICINE | Facility: CLINIC | Age: 72
End: 2022-04-06
Payer: MEDICARE

## 2022-04-06 DIAGNOSIS — E78.2 HYPERLIPIDEMIA, MIXED: ICD-10-CM

## 2022-04-06 DIAGNOSIS — E11.42 TYPE 2 DIABETES MELLITUS WITH DIABETIC POLYNEUROPATHY, WITHOUT LONG-TERM CURRENT USE OF INSULIN: Primary | ICD-10-CM

## 2022-04-07 ENCOUNTER — PATIENT MESSAGE (OUTPATIENT)
Dept: INTERNAL MEDICINE | Facility: CLINIC | Age: 72
End: 2022-04-07
Payer: MEDICARE

## 2022-04-20 DIAGNOSIS — E11.9 TYPE 2 DIABETES MELLITUS WITHOUT COMPLICATION, UNSPECIFIED WHETHER LONG TERM INSULIN USE: ICD-10-CM

## 2022-04-27 ENCOUNTER — OFFICE VISIT (OUTPATIENT)
Dept: INTERNAL MEDICINE | Facility: CLINIC | Age: 72
End: 2022-04-27
Payer: MEDICARE

## 2022-04-27 ENCOUNTER — PATIENT MESSAGE (OUTPATIENT)
Dept: INTERNAL MEDICINE | Facility: CLINIC | Age: 72
End: 2022-04-27

## 2022-04-27 VITALS
HEART RATE: 75 BPM | BODY MASS INDEX: 21.41 KG/M2 | DIASTOLIC BLOOD PRESSURE: 60 MMHG | OXYGEN SATURATION: 98 % | HEIGHT: 65 IN | WEIGHT: 128.5 LBS | SYSTOLIC BLOOD PRESSURE: 108 MMHG

## 2022-04-27 DIAGNOSIS — K90.0 CELIAC DISEASE: ICD-10-CM

## 2022-04-27 DIAGNOSIS — G89.29 CHRONIC NECK PAIN: ICD-10-CM

## 2022-04-27 DIAGNOSIS — M54.2 CHRONIC NECK PAIN: ICD-10-CM

## 2022-04-27 DIAGNOSIS — F51.01 PRIMARY INSOMNIA: ICD-10-CM

## 2022-04-27 DIAGNOSIS — K21.9 GERD WITHOUT ESOPHAGITIS: ICD-10-CM

## 2022-04-27 DIAGNOSIS — R41.3 MEMORY LOSS: ICD-10-CM

## 2022-04-27 DIAGNOSIS — H81.03 MENIERE'S DISEASE OF BOTH EARS: ICD-10-CM

## 2022-04-27 DIAGNOSIS — F33.0 MILD RECURRENT MAJOR DEPRESSION: ICD-10-CM

## 2022-04-27 DIAGNOSIS — E11.42 TYPE 2 DIABETES MELLITUS WITH DIABETIC POLYNEUROPATHY, WITHOUT LONG-TERM CURRENT USE OF INSULIN: Primary | ICD-10-CM

## 2022-04-27 DIAGNOSIS — E78.2 HYPERLIPIDEMIA, MIXED: ICD-10-CM

## 2022-04-27 PROCEDURE — 3078F DIAST BP <80 MM HG: CPT | Mod: CPTII,S$GLB,, | Performed by: INTERNAL MEDICINE

## 2022-04-27 PROCEDURE — 3044F HG A1C LEVEL LT 7.0%: CPT | Mod: CPTII,S$GLB,, | Performed by: INTERNAL MEDICINE

## 2022-04-27 PROCEDURE — 1159F MED LIST DOCD IN RCRD: CPT | Mod: CPTII,S$GLB,, | Performed by: INTERNAL MEDICINE

## 2022-04-27 PROCEDURE — 3008F PR BODY MASS INDEX (BMI) DOCUMENTED: ICD-10-PCS | Mod: CPTII,S$GLB,, | Performed by: INTERNAL MEDICINE

## 2022-04-27 PROCEDURE — 99214 PR OFFICE/OUTPT VISIT, EST, LEVL IV, 30-39 MIN: ICD-10-PCS | Mod: S$GLB,,, | Performed by: INTERNAL MEDICINE

## 2022-04-27 PROCEDURE — 3044F PR MOST RECENT HEMOGLOBIN A1C LEVEL <7.0%: ICD-10-PCS | Mod: CPTII,S$GLB,, | Performed by: INTERNAL MEDICINE

## 2022-04-27 PROCEDURE — 99999 PR PBB SHADOW E&M-EST. PATIENT-LVL V: CPT | Mod: PBBFAC,,, | Performed by: INTERNAL MEDICINE

## 2022-04-27 PROCEDURE — 3074F SYST BP LT 130 MM HG: CPT | Mod: CPTII,S$GLB,, | Performed by: INTERNAL MEDICINE

## 2022-04-27 PROCEDURE — 3074F PR MOST RECENT SYSTOLIC BLOOD PRESSURE < 130 MM HG: ICD-10-PCS | Mod: CPTII,S$GLB,, | Performed by: INTERNAL MEDICINE

## 2022-04-27 PROCEDURE — 1126F PR PAIN SEVERITY QUANTIFIED, NO PAIN PRESENT: ICD-10-PCS | Mod: CPTII,S$GLB,, | Performed by: INTERNAL MEDICINE

## 2022-04-27 PROCEDURE — 3078F PR MOST RECENT DIASTOLIC BLOOD PRESSURE < 80 MM HG: ICD-10-PCS | Mod: CPTII,S$GLB,, | Performed by: INTERNAL MEDICINE

## 2022-04-27 PROCEDURE — 99214 OFFICE O/P EST MOD 30 MIN: CPT | Mod: S$GLB,,, | Performed by: INTERNAL MEDICINE

## 2022-04-27 PROCEDURE — 3008F BODY MASS INDEX DOCD: CPT | Mod: CPTII,S$GLB,, | Performed by: INTERNAL MEDICINE

## 2022-04-27 PROCEDURE — 99999 PR PBB SHADOW E&M-EST. PATIENT-LVL V: ICD-10-PCS | Mod: PBBFAC,,, | Performed by: INTERNAL MEDICINE

## 2022-04-27 PROCEDURE — 1159F PR MEDICATION LIST DOCUMENTED IN MEDICAL RECORD: ICD-10-PCS | Mod: CPTII,S$GLB,, | Performed by: INTERNAL MEDICINE

## 2022-04-27 PROCEDURE — 1160F PR REVIEW ALL MEDS BY PRESCRIBER/CLIN PHARMACIST DOCUMENTED: ICD-10-PCS | Mod: CPTII,S$GLB,, | Performed by: INTERNAL MEDICINE

## 2022-04-27 PROCEDURE — 1160F RVW MEDS BY RX/DR IN RCRD: CPT | Mod: CPTII,S$GLB,, | Performed by: INTERNAL MEDICINE

## 2022-04-27 PROCEDURE — 1126F AMNT PAIN NOTED NONE PRSNT: CPT | Mod: CPTII,S$GLB,, | Performed by: INTERNAL MEDICINE

## 2022-04-27 NOTE — PATIENT INSTRUCTIONS
Vit B12 1000 mcg daily  Vit D 1000 IU daily  Turmeric curcumin 500-1000 mg daily  Fish oil 1000 mg daily

## 2022-04-27 NOTE — PROGRESS NOTES
[facility] Note    Chief Complaint      Chief Complaint   Patient presents with    Diabetes     6 MONTH F/U       History of Present Illness      Lily Green is a 71 y.o. female with chronic conditions of DM2 with neuropathy, HLD, GERD, celiac disease, Meniere's disease, depression, insomnia, chronic neck pain who presents today for: follow up chronic conditions.  Complains of fatigue.    DM2 with neuropathy: Controlled on metformin.  Recent A1C 6.0, previously 6.2.  Eye exam UTD with Dr. Rivera 2020.  Foot exam UTD with Dr. Bryan previously.  HLD: Controlled on lipitor.  LDL 64, stable from last check.    GERD: Controlled on pantoprazole.  No new or worsening symptoms.  Depression: Controlled on effexor.  No new or worsening symptoms  Insomnia: Controlled on restoril.  Control is satisfactory on this regimen.  Chronic neck pain 2/2 cervical degenerative disc disease: Sees Dr. Rizo. No new exacerbations.  Off gabapentin 2/2 no perceived benefit.  Using combination of vitamin supplement and topical neuropathy treatment for feet.  Had lumbar sympathetic nerve block which was effective for a few months.  Plans to follow back up for another attempt.   Celiac disease, IBS: previously saw Dr. Amor.  Avoiding triggers to avoid exacerbations.  Doing well with diet control. Saw Dr. Hutton who started on dicyclomine which has helped.  Taking miralax.    Meniere's disease: Controlled  Flu shot missed last season.  TdAP 2015.  Prevnar UTD, pneumovax UTD.  Shingrix discussed.  COVID vaccine UTD, due for booster.  Mammogram scheduled in 2 weeks with Dr. Serrano.  DEXA , normal.  Cscope , Dr. Amor, no polyps, 5 yr interval.    Past Medical History:  Past Medical History:   Diagnosis Date    Anxiety     Celiac disease 2018    Celiac disease     Depression     Diabetes mellitus     Family history of breast cancer in mother      at age 68    Hyperlipidemia      Hypertension     Meniere disease     Menopause     Peptic ulcer     Reflux esophagitis     Urinary tract infection     Vaginal infection     Vaginal Pap smear 04/07/2014    Pap/Hpv Negative        Past Surgical History:   has a past surgical history that includes Tonsillectomy; Appendectomy; Dilation and curettage of uterus (1986); Hysterectomy (1987); Carpal tunnel release (Bilateral, 09/2017); Shoulder surgery (2009 & 2010); Colonoscopy (04/2018); Upper gastrointestinal endoscopy (04/2018); Dupuytren contracture release (Bilateral, 09/2017); Breast surgery; and Injection of neurolytic agent around lumbar sympathetic nerve (N/A, 1/6/2022).    Family History:  family history includes Breast cancer in her mother and paternal aunt; Cancer in her mother and paternal aunt; Diabetes in her paternal grandfather; Hyperlipidemia in her sister; Vision loss in her father, mother, and sister.     Social History:  Social History     Tobacco Use    Smoking status: Never Smoker    Smokeless tobacco: Never Used   Substance Use Topics    Alcohol use: No    Drug use: Never       I personally reviewed all past medical, surgical, social and family history.    Review of Systems   Constitutional: Negative for chills, fever and malaise/fatigue.   HENT: Positive for hearing loss.    Eyes: Positive for discharge.   Respiratory: Negative for shortness of breath and wheezing.    Cardiovascular: Negative for chest pain and palpitations.   Gastrointestinal: Negative for blood in stool, constipation, diarrhea, nausea and vomiting.   Genitourinary: Negative for dysuria and hematuria.   Skin: Negative for rash.   Neurological: Negative for weakness and headaches.   Endo/Heme/Allergies: Negative for polydipsia.   All other systems reviewed and are negative.       Medications:  Outpatient Encounter Medications as of 4/27/2022   Medication Sig Note Dispense Refill    alpha lipoic acid 600 mg Cap        atorvastatin (LIPITOR) 40 MG  tablet TAKE 1 TABLET BY MOUTH ONCE DAILY  90 tablet 3    blood sugar diagnostic (TRUE METRIX GLUCOSE TEST STRIP) Strp 1 strip by Misc.(Non-Drug; Combo Route) route 2 (two) times a day.  200 each 3    blood-glucose meter kit Use to test twice a day  1 each 0    calcium carbonate/vitamin D3 (CALTRATE WITH VITAMIN D3 ORAL) Take by mouth.       cinnamon bark (CINNAMON ORAL) Take by mouth.       coenzyme Q10 100 mg capsule        dicyclomine (BENTYL) 10 MG capsule        DULoxetine (CYMBALTA) 30 MG capsule Take 1 capsule (30 mg total) by mouth once daily.  30 capsule 11    econazole nitrate 1 % cream Apply topically 2 (two) times daily.  30 g 2    finasteride (PROSCAR) 5 mg tablet        flaxseed oil 1,000 mg Cap once a day 10/23/2017: Received from: [facility]      gluc-condr-om3-dha-epa-fish-st 867-076-58-54 mg Cap once a day 10/23/2017: Received from: [facility]      glucosamine HCl 1,500 mg Tab Take 1,500 mg by mouth.       KRILL OIL ORAL Take by mouth.       lancets Misc 1 lancet by Misc.(Non-Drug; Combo Route) route once daily.  200 each 3    meclizine (ANTIVERT) 25 mg tablet Take 1 tablet (25 mg total) by mouth 3 (three) times daily as needed for Dizziness.  20 tablet 0    metFORMIN (GLUCOPHAGE) 500 MG tablet Take 1 tablet (500 mg total) by mouth 3 (three) times daily. 2 po qam, 1 po qhs. (Patient taking differently: Take 500 mg by mouth 2 (two) times daily. 1 po qam, 1 po qhs.)  270 tablet 3    MILK THISTLE ORAL Take by mouth.       pantoprazole (PROTONIX) 40 MG tablet TAKE 1 TABLET BY MOUTH ONCE DAILY   90 tablet 3    pregabalin (LYRICA) 25 MG capsule Take 1 capsule (25 mg total) by mouth 2 (two) times daily.  60 capsule 6    temazepam (RESTORIL) 30 mg capsule TAKE ONE CAPSULE BY MOUTH NIGHTLY AT BEDTIME AS NEEDED FOR INSOMNIA  30 capsule 2    triamcinolone acetonide 0.1% (KENALOG) 0.1 % ointment  "Apply topically 2 (two) times daily as needed (rash).  80 g 11    triamterene-hydrochlorothiazide 37.5-25 mg (MAXZIDE-25) 37.5-25 mg per tablet        venlafaxine (EFFEXOR-XR) 75 MG 24 hr capsule TAKE 1 CAPSULE BY MOUTH ONCE DAILY  90 capsule 3    vit C,H-Ni-rtmah-lutein-zeaxan (PRESERVISION AREDS 2) 259-704-21-1 mg-unit-mg-mg Cap        vitamins  A,C,E-zinc-copper 14,[id number] unit-mg-unit Cap Take 1 tablet by mouth once daily.        No facility-administered encounter medications on file as of 4/27/2022.       Allergies:  Review of patient's allergies indicates:   Allergen Reactions    Crestor [rosuvastatin] Swelling    Gluten protein        Health Maintenance:  Immunization History   Administered Date(s) Administered    COVID-19, MRNA, LN-S, PF (Pfizer) (Purple Cap) 02/13/2021, 03/06/2021    Influenza 11/30/2012    Influenza (FLUAD) - Quadrivalent - Adjuvanted - PF *Preferred* (65+) 10/03/2020    Influenza Split 10/15/2019    Pneumococcal Conjugate - 13 Valent 04/27/2018    Pneumococcal Polysaccharide - 23 Valent 10/14/2019      Health Maintenance   Topic Date Due    Hepatitis C Screening  Never done    TETANUS VACCINE  Never done    DEXA Scan  04/07/2017    Eye Exam  12/07/2021    Hemoglobin A1c  10/22/2022    Mammogram  11/08/2022    Low Dose Statin  02/07/2023    Foot Exam  02/07/2023    Lipid Panel  04/22/2023        Physical Exam      Vital Signs  Pulse: 75  SpO2: 98 %  BP: 108/60  BP Location: Left arm  Patient Position: Sitting  Pain Score: 0-No pain  Height and Weight  Height: 5' 5" (165.1 cm)  Weight: 58.3 kg (128 lb 8.5 oz)  BSA (Calculated - sq m): 1.64 sq meters  BMI (Calculated): 21.4  Weight in (lb) to have BMI = 25: 149.9]    Physical Exam  Vitals reviewed.   Constitutional:       Appearance: She is well-developed.   HENT:      Head: Normocephalic and atraumatic.      Right Ear: External ear normal.      Left Ear: External ear normal.   Cardiovascular:      Rate and Rhythm: " Normal rate and regular rhythm.      Heart sounds: Normal heart sounds. No murmur heard.  Pulmonary:      Effort: Pulmonary effort is normal.      Breath sounds: Normal breath sounds. No wheezing or rales.   Abdominal:      General: Bowel sounds are normal. There is no distension.      Palpations: Abdomen is soft.      Tenderness: There is no abdominal tenderness.          Laboratory:  CBC:  Recent Labs   Lab 10/20/20  0842 10/19/21  0907   WBC 6.45 6.40   RBC 3.90 L 4.11   Hemoglobin 12.3 13.1   Hematocrit 38.3 39.5   Platelets 285 318   MCV 98 96   MCH 31.5 H 31.9 H   MCHC 32.1 33.2     CMP:  Recent Labs   Lab 04/20/21  0754 10/19/21  0908 04/22/22  0910   Glucose 110 137 H 115 H   Calcium 10.2 10.2 9.2   Albumin 4.7 4.7 4.4   Total Protein 8.7 H 8.5 H 8.1   Sodium 136 139 137   Potassium 4.7 4.8 4.6   CO2 29 31 H 30 H   Chloride 95 100 96   BUN 21 H 28 H 32 H   Alkaline Phosphatase 107 114 111   ALT 26 34 29   AST 36 34 44   Total Bilirubin 0.5 0.5 0.4     URINALYSIS:  Recent Labs   Lab 07/02/19  2122 10/31/19  1059 12/23/20  1344 10/11/21  1457   Color, UA Yellow   < > Yellow Yellow   Clarity, UA  --   --  Clear  --    Specific [place], UA 1.010   < >  --  1.020   Spec Grav UA  --    < > 1.020  --    pH, UA 6.0   < > 5.0 6.0   Protein, UA 2+ A   < >  --  Negative   Bacteria Occasional  --   --   --    Nitrite, UA Negative   < > - Negative   Leukocytes, UA 1+ A   < >  --  Negative   Urobilinogen, UA Negative   < > 0.2 Negative   Hyaline Casts, UA 0  --   --   --     < > = values in this interval not displayed.      LIPIDS:  Recent Labs   Lab 10/31/19  1107 10/20/20  0842 04/20/21  0754 10/19/21  0908 04/22/22  0910   TSH 1.321  --   --   --   --    HDL  --    < > 64 58 52   Cholesterol  --    < > 180 194 138   Triglycerides  --    < > 119 160 H 108   LDL Cholesterol  --    < > 92.2 104.0 64.4   HDL/Cholesterol Ratio  --    < > 35.6 29.9 37.7   Non-HDL Cholesterol  --    < > 116 136 86   Total Cholesterol/HDL  Ratio  --    < > 2.8 3.3 2.7    < > = values in this interval not displayed.     TSH:  Recent Labs   Lab 10/31/19  1107   TSH 1.321     A1C:  Recent Labs   Lab 10/23/19  0000 04/15/20  0907 10/20/20  0842 04/20/21  0754 10/19/21  0907 04/22/22  0910   Hemoglobin A1C 6.8 6.2 H 6.0 H 6.1 H 6.2 H 6.0 H       Assessment/Plan     Lily Green is a 71 y.o.female with:    1. Type 2 diabetes mellitus with diabetic polyneuropathy, without long-term current use of insulin  Continue current meds.  Reviewed labs.  Eye exam requesting.  2. Hyperlipidemia, mixed  Continue current meds.    3. GERD without esophagitis  Continue current meds.    4. Mild recurrent major depression  Continue current meds.    5. Primary insomnia  Continue current meds.    6. Chronic neck pain  Continue current meds.  F/u with pain management.  7. Celiac disease  Controlling with diet  8. Meniere's disease of both ears  Controlling with sodium restriction.    Chronic conditions status updated as per HPI.  Other than changes above, cont current medications and maintain follow up with specialists.  Follow up in about 6 months (around 10/27/2022) for Follow up visit.    Future Appointments   Date Time Provider Department Center   5/24/2022  2:20 PM Lucas Bryan DPM [facility] Diamond Calixto MD  [facility]      Answers for HPI/ROS submitted by the patient on 4/22/2022  activity change: Yes  unexpected weight change: No  rhinorrhea: No  trouble swallowing: No  visual disturbance: No  chest tightness: No  polyuria: No  difficulty urinating: No  menstrual problem: No  joint swelling: No  arthralgias: No  confusion: No  dysphoric mood: No

## 2022-05-04 ENCOUNTER — TELEPHONE (OUTPATIENT)
Dept: OBSTETRICS AND GYNECOLOGY | Facility: CLINIC | Age: 72
End: 2022-05-04
Payer: MEDICARE

## 2022-05-04 DIAGNOSIS — D24.9 FIBROADENOMA OF BREAST, UNSPECIFIED LATERALITY: Primary | ICD-10-CM

## 2022-05-13 ENCOUNTER — TELEPHONE (OUTPATIENT)
Dept: INTERNAL MEDICINE | Facility: CLINIC | Age: 72
End: 2022-05-13
Payer: MEDICARE

## 2022-05-13 NOTE — TELEPHONE ENCOUNTER
----- Message from Erica Brunson sent at 5/13/2022 10:34 AM CDT -----  Contact: [phone]  Pt states the dr recommended her to see a neuro psychologist she is asking for the dr to let her know of one please advise and give return call

## 2022-05-13 NOTE — TELEPHONE ENCOUNTER
Referral prepped for neuropsychology but what are we looking to evaluate so I can attach the right diagnosis code

## 2022-05-24 ENCOUNTER — OFFICE VISIT (OUTPATIENT)
Dept: PODIATRY | Facility: CLINIC | Age: 72
End: 2022-05-24
Payer: MEDICARE

## 2022-05-24 VITALS — WEIGHT: 128.5 LBS | HEIGHT: 65 IN | BODY MASS INDEX: 21.41 KG/M2

## 2022-05-24 DIAGNOSIS — M20.41 HAMMER TOES OF BOTH FEET: ICD-10-CM

## 2022-05-24 DIAGNOSIS — M20.42 HAMMER TOES OF BOTH FEET: ICD-10-CM

## 2022-05-24 DIAGNOSIS — E11.42 DIABETIC POLYNEUROPATHY ASSOCIATED WITH TYPE 2 DIABETES MELLITUS: Primary | ICD-10-CM

## 2022-05-24 DIAGNOSIS — B35.1 ONYCHOMYCOSIS DUE TO DERMATOPHYTE: ICD-10-CM

## 2022-05-24 PROCEDURE — 99999 PR PBB SHADOW E&M-EST. PATIENT-LVL II: ICD-10-PCS | Mod: PBBFAC,,, | Performed by: PODIATRIST

## 2022-05-24 PROCEDURE — 99999 PR PBB SHADOW E&M-EST. PATIENT-LVL II: CPT | Mod: PBBFAC,,, | Performed by: PODIATRIST

## 2022-05-24 PROCEDURE — 11721 PR DEBRIDEMENT OF NAILS, 6 OR MORE: ICD-10-PCS | Mod: Q9,S$GLB,, | Performed by: PODIATRIST

## 2022-05-24 PROCEDURE — 1159F PR MEDICATION LIST DOCUMENTED IN MEDICAL RECORD: ICD-10-PCS | Mod: CPTII,S$GLB,, | Performed by: PODIATRIST

## 2022-05-24 PROCEDURE — 11721 DEBRIDE NAIL 6 OR MORE: CPT | Mod: Q9,S$GLB,, | Performed by: PODIATRIST

## 2022-05-24 PROCEDURE — 3008F BODY MASS INDEX DOCD: CPT | Mod: CPTII,S$GLB,, | Performed by: PODIATRIST

## 2022-05-24 PROCEDURE — 1126F AMNT PAIN NOTED NONE PRSNT: CPT | Mod: CPTII,S$GLB,, | Performed by: PODIATRIST

## 2022-05-24 PROCEDURE — 1159F MED LIST DOCD IN RCRD: CPT | Mod: CPTII,S$GLB,, | Performed by: PODIATRIST

## 2022-05-24 PROCEDURE — 3044F PR MOST RECENT HEMOGLOBIN A1C LEVEL <7.0%: ICD-10-PCS | Mod: CPTII,S$GLB,, | Performed by: PODIATRIST

## 2022-05-24 PROCEDURE — 99499 UNLISTED E&M SERVICE: CPT | Mod: S$GLB,,, | Performed by: PODIATRIST

## 2022-05-24 PROCEDURE — 99499 NO LOS: ICD-10-PCS | Mod: S$GLB,,, | Performed by: PODIATRIST

## 2022-05-24 PROCEDURE — 1126F PR PAIN SEVERITY QUANTIFIED, NO PAIN PRESENT: ICD-10-PCS | Mod: CPTII,S$GLB,, | Performed by: PODIATRIST

## 2022-05-24 PROCEDURE — 3008F PR BODY MASS INDEX (BMI) DOCUMENTED: ICD-10-PCS | Mod: CPTII,S$GLB,, | Performed by: PODIATRIST

## 2022-05-24 PROCEDURE — 3044F HG A1C LEVEL LT 7.0%: CPT | Mod: CPTII,S$GLB,, | Performed by: PODIATRIST

## 2022-05-25 NOTE — PROGRESS NOTES
Subjective:      Patient ID: Lily Green is a 71 y.o. female.    Chief Complaint: Diabetes Mellitus and Diabetic Foot Exam ([facility] 4/27/22)    Lily Kline is a 71 y.o. female who presents to the clinic for evaluation and treatment of diabetic feet. Lily Kline has a past medical history of Anxiety, Celiac disease (05/2018), Celiac disease, Depression, Diabetes mellitus, Family history of breast cancer in mother, Hyperlipidemia, Hypertension, Meniere disease, Menopause, Peptic ulcer, Reflux esophagitis, Urinary tract infection, Vaginal infection, and Vaginal Pap smear (04/07/2014). Relates having toenails toenails that are in need of trimming.  Denies pain from the nails with today's exam, and has not attempted to self treat.  States prior calluses have mostly resolved with regular application of amlactin.  She continues with excellent control over her blood glucose.  Relates having the usual neuropathy pain, however, is manageable with today's exam.   Denies any additional pedal complaints.    PCP: Pavel Calixto MD    Date Last Seen by PCP: 4/22  Hemoglobin A1C   Date Value Ref Range Status   04/22/2022 6.0 (H) 4.0 - 5.6 % Final     Comment:     ADA Screening Guidelines:  5.7-6.4%  Consistent with prediabetes  >or=6.5%  Consistent with diabetes    High levels of fetal hemoglobin interfere with the HbA1C  assay. Heterozygous hemoglobin variants (HbS, HgC, etc)do  not significantly interfere with this assay.   However, presence of multiple variants may affect accuracy.     10/19/2021 6.2 (H) 4.0 - 5.6 % Final     Comment:     ADA Screening Guidelines:  5.7-6.4%  Consistent with prediabetes  >or=6.5%  Consistent with diabetes    High levels of fetal hemoglobin interfere with the HbA1C  assay. Heterozygous hemoglobin variants (HbS, HgC, etc)do  not significantly interfere with this assay.   However, presence of multiple variants may affect accuracy.     04/20/2021 6.1 (H) 4.0 - 5.6 % Final     Comment:      ADA Screening Guidelines:  5.7-6.4%  Consistent with prediabetes  >or=6.5%  Consistent with diabetes    High levels of fetal hemoglobin interfere with the HbA1C  assay. Heterozygous hemoglobin variants (HbS, HgC, etc)do  not significantly interfere with this assay.   However, presence of multiple variants may affect accuracy.     10/23/2019 6.8 % Final         Past Medical History:   Diagnosis Date    Anxiety     Celiac disease 2018    Celiac disease     Depression     Diabetes mellitus     Family history of breast cancer in mother      at age 68    Hyperlipidemia     Hypertension     Meniere disease     Menopause     Peptic ulcer     Reflux esophagitis     Urinary tract infection     Vaginal infection     Vaginal Pap smear 2014    Pap/Hpv Negative        Past Surgical History:   Procedure Laterality Date    APPENDECTOMY      BREAST SURGERY      Tags    CARPAL TUNNEL RELEASE Bilateral 2017    ,     COLONOSCOPY  2018    Normal  (Cornell Velazco)     DILATION AND CURETTAGE OF UTERUS  1986    DUPUYTREN CONTRACTURE RELEASE Bilateral 2017    HYSTERECTOMY  1987    BEAU w/ appy, no BSO     INJECTION OF NEUROLYTIC AGENT AROUND LUMBAR SYMPATHETIC NERVE N/A 2022    Procedure: BLOCK, LUMBAR SYMPATHETIC;  Surgeon: Souleymane Rizo Jr., MD;  Location: [facility];  Service: Pain Management;  Laterality: N/A;  no pacemaker   pt is diabetic     SHOULDER SURGERY   &     right rotator cuff    TONSILLECTOMY      UPPER GASTROINTESTINAL ENDOSCOPY  2018       Family History   Problem Relation Age of Onset    Vision loss Father     Breast cancer Mother     Cancer Mother     Vision loss Mother     Hyperlipidemia Sister     Breast cancer Paternal Aunt     Cancer Paternal Aunt     Diabetes Paternal Grandfather     Vision loss Sister     Kidney disease Neg Hx        Social History     Socioeconomic History    Marital status:    Tobacco Use    Smoking  status: Never Smoker    Smokeless tobacco: Never Used   Substance and Sexual Activity    Alcohol use: No    Drug use: Never    Sexual activity: Not Currently     Partners: Male     Birth control/protection: See Surgical Hx     Comment: :      Social Determinants of Health     Financial Resource Strain: Low Risk     Difficulty of Paying Living Expenses: Not hard at all   Food Insecurity: No Food Insecurity    Worried About Running Out of Food in the Last Year: Never true    Ran Out of Food in the Last Year: Never true   Transportation Needs: No Transportation Needs    Lack of Transportation (Medical): No    Lack of Transportation (Non-Medical): No   Physical Activity: Sufficiently Active    Days of Exercise per Week: 6 days    Minutes of Exercise per Session: 40 min   Stress: Stress Concern Present    Feeling of Stress : Very much   Social Connections: Unknown    Frequency of Communication with Friends and Family: More than three times a week    Frequency of Social Gatherings with Friends and Family: More than three times a week    Active Member of Clubs or Organizations: Yes    Attends Club or Organization Meetings: More than 4 times per year    Marital Status:    Housing Stability: Low Risk     Unable to Pay for Housing in the Last Year: No    Number of Places Lived in the Last Year: 1    Unstable Housing in the Last Year: No       Current Outpatient Medications   Medication Sig Dispense Refill    alpha lipoic acid 600 mg Cap       atorvastatin (LIPITOR) 40 MG tablet TAKE 1 TABLET BY MOUTH ONCE DAILY 90 tablet 3    blood sugar diagnostic (TRUE METRIX GLUCOSE TEST STRIP) Strp 1 strip by Misc.(Non-Drug; Combo Route) route 2 (two) times a day. 200 each 3    blood-glucose meter kit Use to test twice a day 1 each 0    calcium carbonate/vitamin D3 (CALTRATE WITH VITAMIN D3 ORAL) Take by mouth.      cinnamon bark (CINNAMON ORAL) Take by mouth.      coenzyme Q10 100 mg capsule        dicyclomine (BENTYL) 10 MG capsule       DULoxetine (CYMBALTA) 30 MG capsule Take 1 capsule (30 mg total) by mouth once daily. 30 capsule 11    econazole nitrate 1 % cream Apply topically 2 (two) times daily. 30 g 2    finasteride (PROSCAR) 5 mg tablet       flaxseed oil 1,000 mg Cap once a day      gluc-condr-om3-dha-epa-fish-st 807-857-89-54 mg Cap once a day      glucosamine HCl 1,500 mg Tab Take 1,500 mg by mouth.      KRILL OIL ORAL Take by mouth.      lancets Misc 1 lancet by Misc.(Non-Drug; Combo Route) route once daily. 200 each 3    meclizine (ANTIVERT) 25 mg tablet Take 1 tablet (25 mg total) by mouth 3 (three) times daily as needed for Dizziness. 20 tablet 0    metFORMIN (GLUCOPHAGE) 500 MG tablet Take 1 tablet (500 mg total) by mouth 3 (three) times daily. 2 po qam, 1 po qhs. (Patient taking differently: Take 500 mg by mouth 2 (two) times daily. 1 po qam, 1 po qhs.) 270 tablet 3    MILK THISTLE ORAL Take by mouth.      pantoprazole (PROTONIX) 40 MG tablet TAKE 1 TABLET BY MOUTH ONCE DAILY  90 tablet 3    temazepam (RESTORIL) 30 mg capsule TAKE ONE CAPSULE BY MOUTH NIGHTLY AT BEDTIME AS NEEDED FOR INSOMNIA 30 capsule 2    triamcinolone acetonide 0.1% (KENALOG) 0.1 % ointment Apply topically 2 (two) times daily as needed (rash). 80 g 11    triamterene-hydrochlorothiazide 37.5-25 mg (MAXZIDE-25) 37.5-25 mg per tablet       vit C,X-Ph-mqqhz-lutein-zeaxan (PRESERVISION AREDS 2) 183-248-07-1 mg-unit-mg-mg Cap       vitamins  A,C,E-zinc-copper 14,320-226-200 unit-mg-unit Cap Take 1 tablet by mouth once daily.      pregabalin (LYRICA) 25 MG capsule Take 1 capsule (25 mg total) by mouth 2 (two) times daily. 60 capsule 6     No current facility-administered medications for this visit.       Review of patient's allergies indicates:   Allergen Reactions    Crestor [rosuvastatin] Swelling    Gluten protein          Review of Systems   Constitutional: Negative for chills and fever.    Cardiovascular: Negative for claudication and leg swelling.   Skin: Positive for color change and nail changes. Negative for suspicious lesions.   Musculoskeletal: Positive for joint pain. Negative for joint swelling, muscle cramps and muscle weakness.   Gastrointestinal: Negative for nausea and vomiting.   Neurological: Positive for numbness and paresthesias.   Psychiatric/Behavioral: Negative for altered mental status.           Objective:      Physical Exam  Constitutional:       General: She is not in acute distress.     Appearance: She is well-developed. She is not diaphoretic.   Cardiovascular:      Pulses:           Dorsalis pedis pulses are 2+ on the right side and 2+ on the left side.        Posterior tibial pulses are 1+ on the right side and 1+ on the left side.      Comments: CFT is < 3 seconds bilateral.  Pedal hair growth is decreased bilateral.  Mild non pitting lower extremity edema noted bilateral.  Toes are cool to touch bilateral.  Musculoskeletal:         General: Deformity present. No tenderness.      Comments: Muscle strength 5/5 in all muscle groups bilateral.   (-) for pain with palpation of bilateral foot and ankle. Semi-reducible contracture of toes 2-5 bilateral with adductovarus rotation of the 5th.   Skin:     General: Skin is warm and dry.      Capillary Refill: Capillary refill takes less than 2 seconds.      Coloration: Skin is not pale.      Findings: No abrasion, bruising, burn, ecchymosis, erythema, lesion, petechiae or rash.      Comments: Pedal skin appears thin bilateral.  Toenails x 10 appear thickened by 2 mm, elongated by 4 mm, and discolored with subungual debris.  No further callus build up and resolution of prior tinea pedis infection.     Neurological:      Mental Status: She is alert and oriented to person, place, and time.      Sensory: Sensory deficit present.      Motor: No weakness or atrophy.      Comments: Protective sensation per Baudette-Corazon monofilament is  decreased bilateral.  Vibratory sensation is decreased bilateral.  Light touch is intact bilateral.               Assessment:       Encounter Diagnoses   Name Primary?    Diabetic polyneuropathy associated with type 2 diabetes mellitus Yes    Hammer toes of both feet     Onychomycosis due to dermatophyte          Plan:       Lily Kline was seen today for diabetes mellitus and diabetic foot exam.    Diagnoses and all orders for this visit:    Diabetic polyneuropathy associated with type 2 diabetes mellitus    Hammer toes of both feet    Onychomycosis due to dermatophyte      I counseled the patient on her conditions, their implications and medical management.    To continue wearing shoe gear that minimizes pressure to the digital deformities.    Shoe inspection. Diabetic Foot Education. Patient reminded of the importance of good nutrition and blood sugar control to help prevent podiatric complications of diabetes. Patient instructed on proper foot hygeine. We discussed wearing proper shoe gear, daily foot inspections, never walking without protective shoe gear, never putting sharp instruments to feet    With patient's permission, nails were aggressively reduced and debrided x 10 to their soft tissue attachment mechanically and with electric , removing all offending nail and debris.  Patient relates relief following the procedure. She will continue to monitor the areas daily, inspect her feet, wear protective shoe gear when ambulatory, moisturizer to maintain skin integrity and follow in this office in approximately 3 months, sooner p.r.n.    Follow up in about 3 months (around 8/24/2022).    Lucas Bryan DPM

## 2022-05-30 ENCOUNTER — PATIENT MESSAGE (OUTPATIENT)
Dept: PAIN MEDICINE | Facility: CLINIC | Age: 72
End: 2022-05-30
Payer: MEDICARE

## 2022-05-30 ENCOUNTER — PATIENT MESSAGE (OUTPATIENT)
Dept: ADMINISTRATIVE | Facility: HOSPITAL | Age: 72
End: 2022-05-30
Payer: MEDICARE

## 2022-05-30 DIAGNOSIS — F51.01 PRIMARY INSOMNIA: ICD-10-CM

## 2022-05-30 RX ORDER — TEMAZEPAM 30 MG/1
30 CAPSULE ORAL NIGHTLY PRN
Qty: 30 CAPSULE | Refills: 2 | Status: SHIPPED | OUTPATIENT
Start: 2022-05-30 | End: 2022-08-29

## 2022-05-30 NOTE — TELEPHONE ENCOUNTER
No new care gaps identified.  [facility] Embedded Care Gaps. Reference number: [id number]. 5/30/2022   8:07:02 AM JOET

## 2022-06-01 ENCOUNTER — OFFICE VISIT (OUTPATIENT)
Dept: PAIN MEDICINE | Facility: CLINIC | Age: 72
End: 2022-06-01
Payer: MEDICARE

## 2022-06-01 VITALS — DIASTOLIC BLOOD PRESSURE: 81 MMHG | SYSTOLIC BLOOD PRESSURE: 137 MMHG | HEART RATE: 77 BPM

## 2022-06-01 DIAGNOSIS — M79.672 BILATERAL FOOT PAIN: Primary | ICD-10-CM

## 2022-06-01 DIAGNOSIS — M25.519: ICD-10-CM

## 2022-06-01 DIAGNOSIS — G89.29: ICD-10-CM

## 2022-06-01 DIAGNOSIS — G60.9 IDIOPATHIC NEUROPATHY: ICD-10-CM

## 2022-06-01 DIAGNOSIS — M79.671 BILATERAL FOOT PAIN: Primary | ICD-10-CM

## 2022-06-01 PROCEDURE — 3075F SYST BP GE 130 - 139MM HG: CPT | Mod: CPTII,S$GLB,, | Performed by: PAIN MEDICINE

## 2022-06-01 PROCEDURE — 99999 PR PBB SHADOW E&M-EST. PATIENT-LVL IV: CPT | Mod: PBBFAC,,, | Performed by: PAIN MEDICINE

## 2022-06-01 PROCEDURE — 99214 OFFICE O/P EST MOD 30 MIN: CPT | Mod: 25,S$GLB,, | Performed by: PAIN MEDICINE

## 2022-06-01 PROCEDURE — 99999 PR PBB SHADOW E&M-EST. PATIENT-LVL IV: ICD-10-PCS | Mod: PBBFAC,,, | Performed by: PAIN MEDICINE

## 2022-06-01 PROCEDURE — 20553 NJX 1/MLT TRIGGER POINTS 3/>: CPT | Mod: S$GLB,,, | Performed by: PAIN MEDICINE

## 2022-06-01 PROCEDURE — 1125F PR PAIN SEVERITY QUANTIFIED, PAIN PRESENT: ICD-10-PCS | Mod: CPTII,S$GLB,, | Performed by: PAIN MEDICINE

## 2022-06-01 PROCEDURE — 1159F PR MEDICATION LIST DOCUMENTED IN MEDICAL RECORD: ICD-10-PCS | Mod: CPTII,S$GLB,, | Performed by: PAIN MEDICINE

## 2022-06-01 PROCEDURE — 20553 PR INJECT TRIGGER POINTS, > 3: ICD-10-PCS | Mod: S$GLB,,, | Performed by: PAIN MEDICINE

## 2022-06-01 PROCEDURE — 3079F PR MOST RECENT DIASTOLIC BLOOD PRESSURE 80-89 MM HG: ICD-10-PCS | Mod: CPTII,S$GLB,, | Performed by: PAIN MEDICINE

## 2022-06-01 PROCEDURE — 1125F AMNT PAIN NOTED PAIN PRSNT: CPT | Mod: CPTII,S$GLB,, | Performed by: PAIN MEDICINE

## 2022-06-01 PROCEDURE — 3075F PR MOST RECENT SYSTOLIC BLOOD PRESS GE 130-139MM HG: ICD-10-PCS | Mod: CPTII,S$GLB,, | Performed by: PAIN MEDICINE

## 2022-06-01 PROCEDURE — 1159F MED LIST DOCD IN RCRD: CPT | Mod: CPTII,S$GLB,, | Performed by: PAIN MEDICINE

## 2022-06-01 PROCEDURE — 3044F HG A1C LEVEL LT 7.0%: CPT | Mod: CPTII,S$GLB,, | Performed by: PAIN MEDICINE

## 2022-06-01 PROCEDURE — 3079F DIAST BP 80-89 MM HG: CPT | Mod: CPTII,S$GLB,, | Performed by: PAIN MEDICINE

## 2022-06-01 PROCEDURE — 99214 PR OFFICE/OUTPT VISIT, EST, LEVL IV, 30-39 MIN: ICD-10-PCS | Mod: 25,S$GLB,, | Performed by: PAIN MEDICINE

## 2022-06-01 PROCEDURE — 3044F PR MOST RECENT HEMOGLOBIN A1C LEVEL <7.0%: ICD-10-PCS | Mod: CPTII,S$GLB,, | Performed by: PAIN MEDICINE

## 2022-06-01 RX ORDER — PREGABALIN 50 MG/1
50 CAPSULE ORAL 2 TIMES DAILY
Qty: 60 CAPSULE | Refills: 6 | Status: SHIPPED | OUTPATIENT
Start: 2022-06-01 | End: 2022-10-10

## 2022-06-01 RX ORDER — DEXAMETHASONE SODIUM PHOSPHATE 4 MG/ML
4 INJECTION, SOLUTION INTRA-ARTICULAR; INTRALESIONAL; INTRAMUSCULAR; INTRAVENOUS; SOFT TISSUE
Status: DISCONTINUED | OUTPATIENT
Start: 2022-06-01 | End: 2022-06-01 | Stop reason: HOSPADM

## 2022-06-01 RX ADMIN — DEXAMETHASONE SODIUM PHOSPHATE 4 MG: 4 INJECTION, SOLUTION INTRA-ARTICULAR; INTRALESIONAL; INTRAMUSCULAR; INTRAVENOUS; SOFT TISSUE at 06:06

## 2022-06-01 NOTE — PROGRESS NOTES
[facility]    Referred by: No ref. provider found   Reason for referral: * No diagnoses found *     CC:   Chief Complaint   Patient presents with    Medication Refill     Interval Update:   06/01/2022 - Ms. Green is following up for bilateral foot pain and mediation refill..  She requests a refill of Lyrica (pregabalin) which are working well to control Her pain.  She reports ?% relief with the current medication regimen.  Medication side effects: none.  Pain is currently rate 3/10 with a weekly range 3-4/10.  It is described as Aching, Tingling and Numb.   Pain is worsened by: daily activity and improved by: massage.  She notes 70-80% relief of bilateral foot pain since the bilateral lumbar sympathetic block 1/6/22 lasting up until May 2022.  During that time she reports using none of the topical medications she was previously using, but has now restarted.    12/16/21 - Ms. Green is following up for bilateral foot pain. Pain is currently rate 2/10 with a weekly range 2-4/10.  Patient continues to complain of bilateral foot pain she was previously started on Lyrica 25 mg t.i.d. which she continues to take she reports no adverse side effects however she reports no significant relief with this medication.    Subjective:   Lily Green is a 71 y.o. female who has a past medical history of Anxiety, Celiac disease, Celiac disease, Depression, Diabetes mellitus, Family history of breast cancer in mother, Hyperlipidemia, Hypertension, Meniere disease, Menopause, Peptic ulcer, Reflux esophagitis, Urinary tract infection, Vaginal infection, and Vaginal Pap smear. She complains of pain as described below.    Location: bilateral feet   Onset: pain presents for several years but pain has been moderate to severe for about a year; especially bad since Oct 2021  Radiation: None  Timing: persistent  Current Pain Score: 2/10  Weekly Pain Range: 2-3/10  Quality: Burning, Tingling and  Numb  Worsened by: walking for more than 10 minutes  Improved by: medications  Limitations: walking, crossing legs    Previous Therapies:  PT/OT:   HEP:   TENS:   Interventions: 1/6/22 - Bilat LSB with 70-80% relief for 5 months  Surgery: None relevant  Opioids: No  Adjuvants: Yes, herbal substances; Gabapentin gave no relief at 600 mg TID  Denies: duloxetine    Current Pain Medications:  1. OTC supplements to address nerve pain  2. CBD oil - topical   3. [employer] topical oral works  4. Duloxetine 30 mg daily  5. Pregabalin 25 mg BID    Assessment & Plan:  Problem List Items Addressed This Visit     Idiopathic neuropathy    Relevant Medications    pregabalin (LYRICA) 50 MG capsule    Bilateral foot pain - Primary          11/17/2021 - this is a 71-year-old female with well-controlled type 2 diabetes, degenerative disc disease, chronic knee pain, and celiac disease who presents complaining of progressive numbness and tingling starting in the toes and moving proximally on the legs over the course of the past few months to years.  Patient was advised that the underlying cause of her neuropathy is not quite clear because her type 2 diabetes has been well controlled.  Her neuropathy could be related to a vitamin deficiency caused by celiac disease, it could be related to type 2 diabetes, and it could even be idiopathic.  Regardless of the underlying cause, I have had good success in the past treating patients with a combination of pregabalin and duloxetine.  She is currently taking venlafaxine to treat depression which may be optimal for her mental health but is suboptimal for the treatment of peripheral neuropathy.  Nevertheless, we can start with a trial of pregabalin 25 mg t.i.d. a plan to titrate to affect.  Should a trial of duloxetine be required in the future, I will reach out to her primary care physician, Dr. Calixto, for assistance with the transition.    12/16/2021-Lily Green is a 71  y.o. female who  has a past medical history of Anxiety, Celiac disease (05/2018), Celiac disease, Depression, Diabetes mellitus, Family history of breast cancer in mother, Hyperlipidemia, Hypertension, Meniere disease, Menopause, Peptic ulcer, Reflux esophagitis, Urinary tract infection, Vaginal infection, and Vaginal Pap smear (04/07/2014).  By history and examination this patient has chronic bilateral foot pain etiology is still unclear at this point.  She was previously started on pregabalin 25 mg t.i.d. with minimal relief, recommended we follow Dr. Rizo plan and transition her from Effexor to duloxetine see if this would provide her added benefit to her bilateral foot pain.  Discussed I will send a message to Dr. Calixto recommending a transition from Effexor to duloxetine in addition to her pregabalin.  Once patient has transition to duloxetine discussed that she will follow up with our office in 4-6 weeks to discuss effectiveness of medication.    6/1/22 - patient responded extremely well to a bilateral lumbar sympathetic block and we can repeat that in the future as needed.  She is taking pregabalin and duloxetine.  Her dose of pregabalin is quite low, and I recommend increasing to 50 mg b.i.d. in response to her stated request to have better relief.  Pain relief from the lumbar sympathetic block appears to last approximately 5 months, and has added benefits of eliminating the need for the myriad topical medications she uses in the past.  She has returned to those topical medications because the injection has worn off.  Lastly, she inquired about a trigger point injection for the right periscapular region as she reports dealing with significant muscle based pain over the past few years following a right rotator cuff surgical repair.  I am more than happy to provide that today.    1. Cont Duloxetine per Dr. Calixto  2. Continue Pregabalin and increase to 50 mg BID  3. Consider repeating LSB  4. Right  periscapular TPI today in clinic    Follow Up:  Follow-up 4- 6 weeks    Souleymane Rizo Jr, MD  Interventional Pain Medicine / Anesthesiology    Disclaimer: This note was partly generated using dictation software which may occasionally result in transcription errors.    Imaging:  NA    Review of Systems:  Review of Systems   Constitutional: Negative for chills and fever.   HENT: Negative for nosebleeds.    Eyes: Negative for blurred vision and pain.   Respiratory: Negative for hemoptysis.    Cardiovascular: Negative for chest pain and palpitations.   Gastrointestinal: Negative for heartburn, nausea and vomiting.   Genitourinary: Negative for dysuria and hematuria.   Musculoskeletal: Positive for joint pain. Negative for myalgias.   Skin: Negative for rash.   Neurological: Positive for tingling and sensory change. Negative for seizures and loss of consciousness.   Endo/Heme/Allergies: Does not bruise/bleed easily.   Psychiatric/Behavioral: Positive for depression. Negative for hallucinations.       Physical Exam:  Vitals:    06/01/22 1335   BP: 137/81   Pulse: 77   PainSc:   3     General    Nursing note and vitals reviewed.  Constitutional: She is oriented to person, place, and time. She appears well-developed and well-nourished. No distress.   HENT:   Head: Normocephalic and atraumatic.   Nose: Nose normal.   Eyes: Conjunctivae and EOM are normal. Pupils are equal, round, and reactive to light. Right eye exhibits no discharge. Left eye exhibits no discharge. No scleral icterus.   Neck: No JVD present.   Cardiovascular: Intact distal pulses.    Pulmonary/Chest: Effort normal. No respiratory distress.   Abdominal: She exhibits no distension.   Neurological: She is alert and oriented to person, place, and time. Coordination normal.   Psychiatric: She has a normal mood and affect. Her behavior is normal. Judgment and thought content normal.     General Musculoskeletal Exam   Gait: normal     Right Ankle/Foot  Exam     Inspection   Scars: absent  Deformity: absent  Erythema: absent  Atrophy: Foot - absent    Muscle Strength   The patient has normal right ankle strength.    Other   Sensation: decreased    Left Ankle/Foot Exam     Inspection  Deformity: absent  Erythema: absent  Atrophy: Foot - absent  Scars: absent    Muscle Strength   The patient has normal left ankle strength.    Other   Sensation: decreased      Vascular Exam     Right Pulses  Dorsalis Pedis:      2+  Posterior Tibial:      2+        Left Pulses  Dorsalis Pedis:      2+  Posterior Tibial:      2+        Edema  Right Lower Leg: absent  Left Lower Leg: absent

## 2022-06-01 NOTE — PROCEDURES
Trigger Point Injection  Performed by: Souleymane Rizo Jr., MD  Authorized by: Souleymane Rizo Jr., MD       Consent Done?:  Yes (Written)    Pre-Procedure:   Indications:  Pain  Site marked: the procedure site was marked     Timeout: prior to procedure the correct patient, procedure, and site was verified      Local anesthesia used?: Yes    Anesthesia:  Local infiltration  Local anesthetic:  Bupivacaine 0.25% without epinephrine  Medications: 4 mg dexamethasone 4 mg/mL    Right subscapularis, right rhomboid major, right rhomboid minor.  Needling technique.  Procedure was well tolerated.    Souleymane Rizo Jr, MD  Interventional Pain Medicine / Anesthesiology

## 2022-06-02 ENCOUNTER — PATIENT OUTREACH (OUTPATIENT)
Dept: ADMINISTRATIVE | Facility: HOSPITAL | Age: 72
End: 2022-06-02
Payer: MEDICARE

## 2022-06-02 ENCOUNTER — OFFICE VISIT (OUTPATIENT)
Dept: NEUROLOGY | Facility: CLINIC | Age: 72
End: 2022-06-02
Payer: MEDICARE

## 2022-06-02 DIAGNOSIS — R41.9 COGNITIVE COMPLAINTS: ICD-10-CM

## 2022-06-02 DIAGNOSIS — F33.0 MILD RECURRENT MAJOR DEPRESSION: Primary | ICD-10-CM

## 2022-06-02 DIAGNOSIS — F41.1 GENERALIZED ANXIETY DISORDER: ICD-10-CM

## 2022-06-02 PROCEDURE — 90791 PSYCH DIAGNOSTIC EVALUATION: CPT | Mod: FQ,95,, | Performed by: CLINICAL NEUROPSYCHOLOGIST

## 2022-06-02 PROCEDURE — 90791 PR PSYCHIATRIC DIAGNOSTIC EVALUATION: ICD-10-PCS | Mod: FQ,95,, | Performed by: CLINICAL NEUROPSYCHOLOGIST

## 2022-06-02 PROCEDURE — 99499 UNLISTED E&M SERVICE: CPT | Mod: 95,,, | Performed by: CLINICAL NEUROPSYCHOLOGIST

## 2022-06-02 PROCEDURE — 99499 NO LOS: ICD-10-PCS | Mod: 95,,, | Performed by: CLINICAL NEUROPSYCHOLOGIST

## 2022-06-02 NOTE — ASSESSMENT & PLAN NOTE
Assessment:  -longstanding  -worsened with recent stressors     Plan:  -psychological assessment to assist with diagnosis and treatment plan

## 2022-06-02 NOTE — LETTER
AUTHORIZATION FOR RELEASE OF   CONFIDENTIAL INFORMATION    Dear Dr. Gerardo Rivera,    We are seeing Lily Green, date of birth 1950, in the clinic at [facility]. Pavel Calixto MD is the patient's PCP. Lily Green has an outstanding lab/procedure at the time we reviewed her chart. In order to help keep her health information updated, she has authorized us to request the following medical record(s):        (  )  MAMMOGRAM                                      (  )  COLONOSCOPY      (  )  PAP SMEAR                                          (  )  OUTSIDE LAB RESULTS     (  )  DEXA SCAN                                          ( X )  EYE EXAM            (  )  FOOT EXAM                                          (  )  ENTIRE RECORD     (  )  OUTSIDE IMMUNIZATIONS                 (  )  _______________         Please fax records to [facility], David M Klibert, MD, [phone]     If you have any questions, please contact YUMIKO Forrester at [phone].           Patient Name: Lily Green  : 1950  Patient Phone #: [phone]

## 2022-06-02 NOTE — PROGRESS NOTES
Health Maintenance Due   Topic Date Due    Hepatitis C Screening  Never done    TETANUS VACCINE  Never done    Shingles Vaccine (1 of 2) Never done    DEXA Scan  04/07/2017    COVID-19 Vaccine (3 - Booster for Pfizer series) 08/06/2021    Eye Exam  12/07/2021     Triggered LINKS. Updated Care Everywhere. Record request sent to Dr. Gerardo Rivera asking for a copy of pt's most recent diabetic eye exam results. Chart review completed.

## 2022-06-02 NOTE — PROGRESS NOTES
Health Maintenance Due   Topic Date Due    Hepatitis C Screening  Never done    TETANUS VACCINE  Never done    Shingles Vaccine (1 of 2) Never done    DEXA Scan  04/07/2017    COVID-19 Vaccine (3 - Booster for Pfizer series) 08/06/2021     Triggered LINKS. Updated Care Everywhere. Imported outside diabetic eye exam results into . Chart review completed.

## 2022-06-02 NOTE — PROGRESS NOTES
NEUROPSYCHOLOGY CONSULT (TELEHEALTH)    Referral Information  Name: Lily Green  MRN: [id number]  : 1950  Age: 71 y.o.  Gender: female  Referring Provider: Pavel Calixto MD  Billing: See below for details as coding/billing has changed   Telemedicine:   The patient location is: [place]   The provider location is: [facility]  The chief complaint leading to consultation/medical necessity is: Cognitive concerns  Visit type: Virtual visit with synchronous audio and video     Total time spent with patient: 40 + 50 minutes chart review/documentation   Each patient to whom he or she provides medical services by telemedicine is: (1) informed of the relationship between the physician and patient and the respective role of any other health care provider with respect to management of the patient; and (2) notified that he or she may decline to receive medical services by telemedicine and may withdraw from such care at any time.  Consent/Emergency Plan: The patient expressed an understanding of the purpose of the evaluation and consented to all procedures, including providing consent for Evon Keys, Ph.D., a clinical neuropsychology fellow and provisionally licensed psychologist under the supervision of Dr. Malick Henderson, to be involved in her care. We discussed the limits of confidentiality and discussed an emergency plan.     SUMMARY/TREATMENT PLAN   Results from interview indicate following diagnoses and treatment plan, which were discussed with the patient. She can follow a treatment plan independently. Review below for onset/course of cognitive symptoms along with functional status and pertinent medical history.     Problem List Items Addressed This Visit        Neuro    Cognitive complaints    Overview     Assessment:  -feels memory difficulties began a few years ago; word-finding difficulties 6 months ago  -notes major stressors may be contributing    Plan:  -concern for neurodegenerative condition is low,  "but neuropsychological testing will be completed to assess cognitive status and update treatment plan                Psychiatric    Mild recurrent major depression - Primary    Current Assessment & Plan     Assessment:  -longstanding  -worsened with recent stressors     Plan:  -psychological assessment to assist with diagnosis and treatment plan           Generalized anxiety disorder    Current Assessment & Plan     Assessment:  -longstanding  -worsened with recent stressors     Plan:  -psychological assessment to assist with diagnosis and treatment plan                    Evon Keys, Ph.D.  Neuropsychology Fellow      DORA Henderson II, Ph.D., ABPP-  Board Certified Clinical Neuropsychologist  Co-Director, Cognitive Disorders and Brain Health Program  Department of Neurology and Neurosciences  [facility]    HPI/CURRENT SYMPTOMS   Active problems noted below.      Cognitive Sxs:  · Attention: No problems   · Mental Speed: No problems  · Memory: Forgets if she doesn't use a note/calendar. Bad with remembering names, but longstanding.   · Language: Word finding difficulty  · Visuospatial/Perceptual: No problems   · Executive Functioning: No problems     Onset/Course: Ms. Green's symptoms were gradual in onset. Word-finding difficulty began approximately 6 months ago and is worsening over time, while forgetfulness began a few years ago but has stayed the same over time. The pt noted that feeling stress/overwhelmed contributes to her difficulties.    Neuropsychiatric Sxs:  Mood: "Depressed"  Depression Anxiety Other   · Longstanding  · Currently on Cymbalta and Lyrica which help with neuropathy. She would like medication for mood but stated that she did not want to stop taking her current neuropathy medications.   · "Rather be down a little bit than have neuropathy."   · Rated it at a 5/10  · Longstanding  · 5/10  · Feels better when she is able to talk to someone, but doesn't feel she has anyone she " can do that with currently    · Feels like no one will listen     Neurovegetative:   Sleep [employer]    Takes temazepam which helps minimally   Restless sleep; awakening early    Hard to turn mind off   Hard to eat with all of the diet restrictions she has    Lost 50 pounds but has stabilized now   Low      Behavioral Concerns:   · Impulsive/Compulsive Behaviors: None   · Disinhibition: None  · Apathy: None  · Irritability/Agitation: None  · Delusions/Hallucinations: None  · SI/HI: Denied    Physical Sxs:  Motor Sensory Pain    Balance is poor; worsening bc not doing exercises   Trigger finger issues   Meniere's disease  Shoulder pain (two rotator cuff surgeries)       Current Functioning (I/ADLs):  ADLs  Self-Care Eating Safety Other   Independent Independent Independent NA     Instrumental IADLs:   Driving Medications/Health Household Finances   Independent Independent Independent Independent       PERTINENT BACKGROUND INFORMATION   Prior Testing: None    Family History   Problem Relation Age of Onset    Vision loss Father     Breast cancer Mother     Cancer Mother     Vision loss Mother     Hyperlipidemia Sister     Breast cancer Paternal Aunt     Cancer Paternal Aunt     Diabetes Paternal Grandfather     Vision loss Sister     Kidney disease Neg Hx      Family Neurologic History: Negative for heritable risk factors  Family Psychiatric History: Negative for heritable risk factors    Developmental/Academic Hx:  Developmental: Unremarkable   Academic:  · Learning Difficulties: C average student   · Attention/Behavioral Difficulties: None  · Educational Attainment: Bachelor's in Education     Social/Occupational Hx:  Social:  · Current Relationship/Family Status:  but currently  (on and off since 2000's)  · Primary Source of Support: Niece/sister/friend; feels socially supported  · Current Hobbies: Walks dog, watches TV, plays cards  · Stressors: She and her  are  going through separation and doing marriage therapy which is not going well; she has been care-taking quite a bit for family members with health issues/incidents; 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 (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)     Occupational Hx:  · Occupational Status: Retired in May 2011   · Primary Occupation(s): Taught school for 40 years     Relevant Psychiatric History:  · Child: Unremarkable    · Adult: Contemplated suicide when she and her  first  (early ); no thoughts since then   · Substance Use: Unremarkable     Social History     Tobacco Use    Smoking status: Never Smoker    Smokeless tobacco: Never Used   Substance and Sexual Activity    Alcohol use: No    Drug use: Never    Sexual activity: Not Currently     Partners: Male     Birth control/protection: See Surgical Hx     Comment: :        MEDICAL HISTORY     Patient Active Problem List   Diagnosis    Type 2 diabetes mellitus with diabetic polyneuropathy, without long-term current use of insulin    Hyperlipidemia, mixed    GERD without esophagitis    Mild recurrent major depression    Primary insomnia    Chronic neck pain    DDD (degenerative disc disease), cervical    Screening mammogram, encounter for    Celiac disease    Hand arthritis    Benign essential tremor    Chronic pain of both knees    Irritable bowel syndrome    TANIA (stress urinary incontinence, female)    Meniere's disease of both ears    Ankle weakness    Psoriasis    Idiopathic neuropathy    Bilateral foot pain    Generalized anxiety disorder    Cognitive complaints       Past Medical History:   Diagnosis Date    Anxiety     Celiac disease 2018    Celiac disease     Depression     Diabetes mellitus     Family history of breast cancer in mother      at age 68    Hyperlipidemia     Hypertension     Meniere disease     Menopause     Peptic ulcer     Reflux  esophagitis     Urinary tract infection     Vaginal infection     Vaginal Pap smear 04/07/2014    Pap/Hpv Negative        Past Surgical History:   Procedure Laterality Date    APPENDECTOMY      BREAST SURGERY      Tags    CARPAL TUNNEL RELEASE Bilateral 09/2017    ,     COLONOSCOPY  04/2018    Normal  (Cornell Velazco)     DILATION AND CURETTAGE OF UTERUS  1986    DUPUYTREN CONTRACTURE RELEASE Bilateral 09/2017    HYSTERECTOMY  1987    BEAU w/ appy, no BSO     INJECTION OF NEUROLYTIC AGENT AROUND LUMBAR SYMPATHETIC NERVE N/A 1/6/2022    Procedure: BLOCK, LUMBAR SYMPATHETIC;  Surgeon: Souleymane Rizo Jr., MD;  Location: [facility];  Service: Pain Management;  Laterality: N/A;  no pacemaker   pt is diabetic     SHOULDER SURGERY  2009 & 2010    right rotator cuff    TONSILLECTOMY      UPPER GASTROINTESTINAL ENDOSCOPY  04/2018       Pertinent Neurologic History  TBIs  None   Seizures  None   CVAs  None   Movement Dis.  None   Other  Fell and slipped and hurt her neck (couldn't turn her neck to the right); did physical therapy but still locks up sometimes      Pertinent Labs Impacting Cognition  No results found for: BMBGAOAY20  No results found for: RPR  No results found for: FOLATE  Lab Results   Component Value Date    TSH 1.321 10/31/2019     Lab Results   Component Value Date    HGBA1C 6.0 (H) 04/22/2022     No results found for: HIV1X2, [id number]    Neurodiagnostics  No results found for this or any previous visit.      Current Outpatient Medications:     alpha lipoic acid 600 mg Cap, , Disp: , Rfl:     atorvastatin (LIPITOR) 40 MG tablet, TAKE 1 TABLET BY MOUTH ONCE DAILY, Disp: 90 tablet, Rfl: 3    blood sugar diagnostic (TRUE METRIX GLUCOSE TEST STRIP) Strp, 1 strip by Misc.(Non-Drug; Combo Route) route 2 (two) times a day., Disp: 200 each, Rfl: 3    blood-glucose meter kit, Use to test twice a day, Disp: 1 each, Rfl: 0    calcium carbonate/vitamin D3 (CALTRATE WITH VITAMIN D3  ORAL), Take by mouth., Disp: , Rfl:     cinnamon bark (CINNAMON ORAL), Take by mouth., Disp: , Rfl:     coenzyme Q10 100 mg capsule, , Disp: , Rfl:     dicyclomine (BENTYL) 10 MG capsule, , Disp: , Rfl:     DULoxetine (CYMBALTA) 30 MG capsule, Take 1 capsule (30 mg total) by mouth once daily., Disp: 30 capsule, Rfl: 11    econazole nitrate 1 % cream, Apply topically 2 (two) times daily., Disp: 30 g, Rfl: 2    finasteride (PROSCAR) 5 mg tablet, , Disp: , Rfl:     flaxseed oil 1,000 mg Cap, once a day, Disp: , Rfl:     gluc-condr-om3-dha-epa-fish-st 941-975-64-54 mg Cap, once a day, Disp: , Rfl:     glucosamine HCl 1,500 mg Tab, Take 1,500 mg by mouth., Disp: , Rfl:     KRILL OIL ORAL, Take by mouth., Disp: , Rfl:     lancets Misc, 1 lancet by Misc.(Non-Drug; Combo Route) route once daily., Disp: 200 each, Rfl: 3    meclizine (ANTIVERT) 25 mg tablet, Take 1 tablet (25 mg total) by mouth 3 (three) times daily as needed for Dizziness., Disp: 20 tablet, Rfl: 0    metFORMIN (GLUCOPHAGE) 500 MG tablet, Take 1 tablet (500 mg total) by mouth 3 (three) times daily. 2 po qam, 1 po qhs. (Patient taking differently: Take 500 mg by mouth 2 (two) times daily. 1 po qam, 1 po qhs.), Disp: 270 tablet, Rfl: 3    MILK THISTLE ORAL, Take by mouth., Disp: , Rfl:     pantoprazole (PROTONIX) 40 MG tablet, TAKE 1 TABLET BY MOUTH ONCE DAILY , Disp: 90 tablet, Rfl: 3    pregabalin (LYRICA) 50 MG capsule, Take 1 capsule (50 mg total) by mouth 2 (two) times daily., Disp: 60 capsule, Rfl: 6    temazepam (RESTORIL) 30 mg capsule, Take 1 capsule (30 mg total) by mouth nightly as needed for Insomnia., Disp: 30 capsule, Rfl: 2    triamcinolone acetonide 0.1% (KENALOG) 0.1 % ointment, Apply topically 2 (two) times daily as needed (rash)., Disp: 80 g, Rfl: 11    triamterene-hydrochlorothiazide 37.5-25 mg (MAXZIDE-25) 37.5-25 mg per tablet, , Disp: , Rfl:     vit C,L-Zx-kesxn-lutein-zeaxan (PRESERVISION AREDS 2) 874-116-72-1  "mg-unit-mg-mg Cap, , Disp: , Rfl:     vitamins  A,C,E-zinc-copper [phone] unit-mg-unit Cap, Take 1 tablet by mouth once daily., Disp: , Rfl:   No current facility-administered medications for this visit.      MENTAL STATUS AND OBSERVATIONS:     APPEARANCE: Not observed   ALERTNESS/ORIENTATION: Attentive and alert. Fully oriented (x5) to time and place  GAIT: Not observed   MOTOR MOVEMENTS/MANNERISMS: Not observed  SPEECH/LANGUAGE: Normal in rate, rhythm, tone, and volume. No significant word finding difficulty noted. Expressive and receptive language was normal.  STATED MOOD/AFFECT: The patients stated mood was "Depressed," with intermittent tearfulness and normal range affect.   INTERPERSONAL BEHAVIOR: Rapport was quickly and easily established   SUICIDALITY/HOMICIDALITY: Denied  HALLUCINATIONS/DELUSIONS: None evidenced or endorsed  THOUGHT PROCESSES: Thoughts seemed logical and goal-directed.   INSIGHT/AWARENESS: Good insight/awareness and concerned about cognitive symptoms.       Service Description CPT Code Minutes Units   Psychiatric diagnostic evaluation by physician [id number] [phone]   Neurobehavioral status exam by physician [id number]  0         "

## 2022-07-06 ENCOUNTER — OFFICE VISIT (OUTPATIENT)
Dept: PAIN MEDICINE | Facility: CLINIC | Age: 72
End: 2022-07-06
Payer: MEDICARE

## 2022-07-06 VITALS
BODY MASS INDEX: 21.39 KG/M2 | WEIGHT: 128.5 LBS | DIASTOLIC BLOOD PRESSURE: 79 MMHG | HEART RATE: 66 BPM | SYSTOLIC BLOOD PRESSURE: 136 MMHG

## 2022-07-06 DIAGNOSIS — M79.672 BILATERAL FOOT PAIN: ICD-10-CM

## 2022-07-06 DIAGNOSIS — M25.519: Primary | ICD-10-CM

## 2022-07-06 DIAGNOSIS — M79.2 NEUROPATHIC PAIN: ICD-10-CM

## 2022-07-06 DIAGNOSIS — G89.29: Primary | ICD-10-CM

## 2022-07-06 DIAGNOSIS — M79.671 BILATERAL FOOT PAIN: ICD-10-CM

## 2022-07-06 DIAGNOSIS — G89.4 CHRONIC PAIN SYNDROME: ICD-10-CM

## 2022-07-06 DIAGNOSIS — G60.9 IDIOPATHIC NEUROPATHY: ICD-10-CM

## 2022-07-06 PROCEDURE — 3044F PR MOST RECENT HEMOGLOBIN A1C LEVEL <7.0%: ICD-10-PCS | Mod: CPTII,S$GLB,, | Performed by: NURSE PRACTITIONER

## 2022-07-06 PROCEDURE — 99214 PR OFFICE/OUTPT VISIT, EST, LEVL IV, 30-39 MIN: ICD-10-PCS | Mod: S$GLB,,, | Performed by: NURSE PRACTITIONER

## 2022-07-06 PROCEDURE — 1159F MED LIST DOCD IN RCRD: CPT | Mod: CPTII,S$GLB,, | Performed by: NURSE PRACTITIONER

## 2022-07-06 PROCEDURE — 3078F PR MOST RECENT DIASTOLIC BLOOD PRESSURE < 80 MM HG: ICD-10-PCS | Mod: CPTII,S$GLB,, | Performed by: NURSE PRACTITIONER

## 2022-07-06 PROCEDURE — 3008F BODY MASS INDEX DOCD: CPT | Mod: CPTII,S$GLB,, | Performed by: NURSE PRACTITIONER

## 2022-07-06 PROCEDURE — 99214 OFFICE O/P EST MOD 30 MIN: CPT | Mod: S$GLB,,, | Performed by: NURSE PRACTITIONER

## 2022-07-06 PROCEDURE — 3075F SYST BP GE 130 - 139MM HG: CPT | Mod: CPTII,S$GLB,, | Performed by: NURSE PRACTITIONER

## 2022-07-06 PROCEDURE — 1125F PR PAIN SEVERITY QUANTIFIED, PAIN PRESENT: ICD-10-PCS | Mod: CPTII,S$GLB,, | Performed by: NURSE PRACTITIONER

## 2022-07-06 PROCEDURE — 3078F DIAST BP <80 MM HG: CPT | Mod: CPTII,S$GLB,, | Performed by: NURSE PRACTITIONER

## 2022-07-06 PROCEDURE — 3008F PR BODY MASS INDEX (BMI) DOCUMENTED: ICD-10-PCS | Mod: CPTII,S$GLB,, | Performed by: NURSE PRACTITIONER

## 2022-07-06 PROCEDURE — 3075F PR MOST RECENT SYSTOLIC BLOOD PRESS GE 130-139MM HG: ICD-10-PCS | Mod: CPTII,S$GLB,, | Performed by: NURSE PRACTITIONER

## 2022-07-06 PROCEDURE — 1159F PR MEDICATION LIST DOCUMENTED IN MEDICAL RECORD: ICD-10-PCS | Mod: CPTII,S$GLB,, | Performed by: NURSE PRACTITIONER

## 2022-07-06 PROCEDURE — 3044F HG A1C LEVEL LT 7.0%: CPT | Mod: CPTII,S$GLB,, | Performed by: NURSE PRACTITIONER

## 2022-07-06 PROCEDURE — 99999 PR PBB SHADOW E&M-EST. PATIENT-LVL IV: ICD-10-PCS | Mod: PBBFAC,,, | Performed by: NURSE PRACTITIONER

## 2022-07-06 PROCEDURE — 99999 PR PBB SHADOW E&M-EST. PATIENT-LVL IV: CPT | Mod: PBBFAC,,, | Performed by: NURSE PRACTITIONER

## 2022-07-06 PROCEDURE — 1125F AMNT PAIN NOTED PAIN PRSNT: CPT | Mod: CPTII,S$GLB,, | Performed by: NURSE PRACTITIONER

## 2022-07-06 NOTE — PROGRESS NOTES
[facility]    Referred by: No ref. provider found   Reason for referral: * No diagnoses found *     CC:   Chief Complaint   Patient presents with    Back Pain     Interval Update:  7/6/2022 -  Norma returns to clinic s/p TPI on 6/1/22 with 80% relief.  She reports a pain intensity 2/10 today with a weekly range of 2-3/10.       06/01/2022 - Ms. Green is following up for bilateral foot pain and mediation refill..  She requests a refill of Lyrica (pregabalin) which are working well to control Her pain.  She reports ?% relief with the current medication regimen.  Medication side effects: none.  Pain is currently rate 3/10 with a weekly range 3-4/10.  It is described as Aching, Tingling and Numb.   Pain is worsened by: daily activity and improved by: massage.  She notes 70-80% relief of bilateral foot pain since the bilateral lumbar sympathetic block 1/6/22 lasting up until May 2022.  During that time she reports using none of the topical medications she was previously using, but has now restarted.    12/16/21 - Ms. Green is following up for bilateral foot pain. Pain is currently rate 2/10 with a weekly range 2-4/10.  Patient continues to complain of bilateral foot pain she was previously started on Lyrica 25 mg t.i.d. which she continues to take she reports no adverse side effects however she reports no significant relief with this medication.    Subjective:   Lily Green is a 71 y.o. female who has a past medical history of Anxiety, Celiac disease, Celiac disease, Depression, Diabetes mellitus, Family history of breast cancer in mother, Hyperlipidemia, Hypertension, Meniere disease, Menopause, Peptic ulcer, Reflux esophagitis, Urinary tract infection, Vaginal infection, and Vaginal Pap smear. She complains of pain as described below.    Location: bilateral feet   Onset: pain presents for several years but pain has been moderate to severe for about a  year; especially bad since Oct 2021  Radiation: None  Timing: persistent  Current Pain Score: 2/10  Weekly Pain Range: 2-3/10  Quality: Burning, Tingling and Numb  Worsened by: walking for more than 10 minutes  Improved by: medications  Limitations: walking, crossing legs    Previous Therapies:  PT/OT:   HEP:   TENS:   Interventions:   -6/1/2022 Right periscapular TPI 80%   1/6/22 - Bilat LSB with 70-80% relief for 5 months  Surgery: None relevant  Opioids: No  Adjuvants: Yes, herbal substances; Gabapentin gave no relief at 600 mg TID  Denies: duloxetine    Current Pain Medications:  1. OTC supplements to address nerve pain  2. CBD oil - topical   3. [employer] topical oral works  4. Duloxetine 30 mg daily  5. Pregabalin 50 mg BID    Assessment & Plan:  Problem List Items Addressed This Visit        Neuro    Idiopathic neuropathy       Orthopedic    Bilateral foot pain      Other Visit Diagnoses     Chronic periscapular pain, unspecified laterality    -  Primary    Neuropathic pain        Chronic pain syndrome              11/17/2021 - this is a 71-year-old female with well-controlled type 2 diabetes, degenerative disc disease, chronic knee pain, and celiac disease who presents complaining of progressive numbness and tingling starting in the toes and moving proximally on the legs over the course of the past few months to years.  Patient was advised that the underlying cause of her neuropathy is not quite clear because her type 2 diabetes has been well controlled.  Her neuropathy could be related to a vitamin deficiency caused by celiac disease, it could be related to type 2 diabetes, and it could even be idiopathic.  Regardless of the underlying cause, I have had good success in the past treating patients with a combination of pregabalin and duloxetine.  She is currently taking venlafaxine to treat depression which may be optimal for her mental health but is suboptimal for the treatment of peripheral  neuropathy.  Nevertheless, we can start with a trial of pregabalin 25 mg t.i.d. a plan to titrate to affect.  Should a trial of duloxetine be required in the future, I will reach out to her primary care physician, Dr. Calixto, for assistance with the transition.    12/16/2021-Lily Green is a 71 y.o. female who  has a past medical history of Anxiety, Celiac disease (05/2018), Celiac disease, Depression, Diabetes mellitus, Family history of breast cancer in mother, Hyperlipidemia, Hypertension, Meniere disease, Menopause, Peptic ulcer, Reflux esophagitis, Urinary tract infection, Vaginal infection, and Vaginal Pap smear (04/07/2014).  By history and examination this patient has chronic bilateral foot pain etiology is still unclear at this point.  She was previously started on pregabalin 25 mg t.i.d. with minimal relief, recommended we follow Dr. Rizo plan and transition her from Effexor to duloxetine see if this would provide her added benefit to her bilateral foot pain.  Discussed I will send a message to Dr. Calixto recommending a transition from Effexor to duloxetine in addition to her pregabalin.  Once patient has transition to duloxetine discussed that she will follow up with our office in 4-6 weeks to discuss effectiveness of medication.    6/1/22 - patient responded extremely well to a bilateral lumbar sympathetic block and we can repeat that in the future as needed.  She is taking pregabalin and duloxetine.  Her dose of pregabalin is quite low, and I recommend increasing to 50 mg b.i.d. in response to her stated request to have better relief.  Pain relief from the lumbar sympathetic block appears to last approximately 5 months, and has added benefits of eliminating the need for the myriad topical medications she uses in the past.  She has returned to those topical medications because the injection has worn off.  Lastly, she inquired about a trigger point injection for the right periscapular  region as she reports dealing with significant muscle based pain over the past few years following a right rotator cuff surgical repair.  I am more than happy to provide that today.    7/6/[id number]-sdcp-old female with a history of bilateral foot pain and parascapular pain.  She was provided trigger point injections for parascapular pain last clinic visit and this provided her with 80% relief her pain score today was 2/10.  She has been provided lumbar sympathetic blocks in the past which have helped her bilateral foot pain, she reports her pain being stable at this point discussed planned below.    1. Cont Duloxetine per Dr. Calixto  2. Continue Pregabalin  50 mg BID  3. Consider repeating LSB in the future, patent call to schedule   4. HE sheet to help with mid back pain     Follow Up:  JAMAAL HerreraC  Interventional Pain Management      Disclaimer: This note was partly generated using dictation software which may occasionally result in transcription errors.    Imaging:  NA    Review of Systems:  Review of Systems   Constitutional: Negative for chills and fever.   HENT: Negative for nosebleeds.    Eyes: Negative for blurred vision and pain.   Respiratory: Negative for hemoptysis.    Cardiovascular: Negative for chest pain and palpitations.   Gastrointestinal: Negative for heartburn, nausea and vomiting.   Genitourinary: Negative for dysuria and hematuria.   Musculoskeletal: Positive for joint pain. Negative for myalgias.   Skin: Negative for rash.   Neurological: Positive for tingling and sensory change. Negative for seizures and loss of consciousness.   Endo/Heme/Allergies: Does not bruise/bleed easily.   Psychiatric/Behavioral: Positive for depression. Negative for hallucinations.       Physical Exam:  Vitals:    07/06/22 1325   BP: 136/79   Pulse: 66   Weight: 58.3 kg (128 lb 8.5 oz)   PainSc:   2     General    Nursing note and vitals reviewed.  Constitutional: She is oriented to person, place, and  time. She appears well-developed and well-nourished. No distress.   HENT:   Head: Normocephalic and atraumatic.   Nose: Nose normal.   Eyes: Conjunctivae and EOM are normal. Pupils are equal, round, and reactive to light. Right eye exhibits no discharge. Left eye exhibits no discharge. No scleral icterus.   Neck: No JVD present.   Cardiovascular: Intact distal pulses.    Pulmonary/Chest: Effort normal. No respiratory distress.   Abdominal: She exhibits no distension.   Neurological: She is alert and oriented to person, place, and time. Coordination normal.   Psychiatric: She has a normal mood and affect. Her behavior is normal. Judgment and thought content normal.     General Musculoskeletal Exam   Gait: normal     Right Ankle/Foot Exam     Inspection   Scars: absent  Deformity: absent  Erythema: absent  Atrophy: Foot - absent    Muscle Strength   The patient has normal right ankle strength.    Other   Sensation: decreased    Left Ankle/Foot Exam     Inspection  Deformity: absent  Erythema: absent  Atrophy: Foot - absent  Scars: absent    Muscle Strength   The patient has normal left ankle strength.    Other   Sensation: decreased      Vascular Exam     Right Pulses  Dorsalis Pedis:      2+  Posterior Tibial:      2+        Left Pulses  Dorsalis Pedis:      2+  Posterior Tibial:      2+        Edema  Right Lower Leg: absent  Left Lower Leg: absent

## 2022-07-27 ENCOUNTER — OFFICE VISIT (OUTPATIENT)
Dept: URGENT CARE | Facility: CLINIC | Age: 72
End: 2022-07-27
Payer: MEDICARE

## 2022-07-27 VITALS
RESPIRATION RATE: 19 BRPM | HEART RATE: 69 BPM | SYSTOLIC BLOOD PRESSURE: 118 MMHG | WEIGHT: 128 LBS | OXYGEN SATURATION: 96 % | DIASTOLIC BLOOD PRESSURE: 77 MMHG | BODY MASS INDEX: 21.33 KG/M2 | TEMPERATURE: 99 F | HEIGHT: 65 IN

## 2022-07-27 DIAGNOSIS — U07.1 COVID-19 VIRUS DETECTED: ICD-10-CM

## 2022-07-27 DIAGNOSIS — R05.9 COUGH: ICD-10-CM

## 2022-07-27 DIAGNOSIS — U07.1 COVID-19 VIRUS INFECTION: Primary | ICD-10-CM

## 2022-07-27 LAB
CTP QC/QA: YES
SARS-COV-2 RDRP RESP QL NAA+PROBE: POSITIVE

## 2022-07-27 PROCEDURE — 3074F PR MOST RECENT SYSTOLIC BLOOD PRESSURE < 130 MM HG: ICD-10-PCS | Mod: CPTII,S$GLB,, | Performed by: PHYSICIAN ASSISTANT

## 2022-07-27 PROCEDURE — 99214 OFFICE O/P EST MOD 30 MIN: CPT | Mod: S$GLB,,, | Performed by: PHYSICIAN ASSISTANT

## 2022-07-27 PROCEDURE — 3008F BODY MASS INDEX DOCD: CPT | Mod: CPTII,S$GLB,, | Performed by: PHYSICIAN ASSISTANT

## 2022-07-27 PROCEDURE — 1160F RVW MEDS BY RX/DR IN RCRD: CPT | Mod: CPTII,S$GLB,, | Performed by: PHYSICIAN ASSISTANT

## 2022-07-27 PROCEDURE — 1126F AMNT PAIN NOTED NONE PRSNT: CPT | Mod: CPTII,S$GLB,, | Performed by: PHYSICIAN ASSISTANT

## 2022-07-27 PROCEDURE — 3044F PR MOST RECENT HEMOGLOBIN A1C LEVEL <7.0%: ICD-10-PCS | Mod: CPTII,S$GLB,, | Performed by: PHYSICIAN ASSISTANT

## 2022-07-27 PROCEDURE — 1126F PR PAIN SEVERITY QUANTIFIED, NO PAIN PRESENT: ICD-10-PCS | Mod: CPTII,S$GLB,, | Performed by: PHYSICIAN ASSISTANT

## 2022-07-27 PROCEDURE — 3008F PR BODY MASS INDEX (BMI) DOCUMENTED: ICD-10-PCS | Mod: CPTII,S$GLB,, | Performed by: PHYSICIAN ASSISTANT

## 2022-07-27 PROCEDURE — U0002 COVID-19 LAB TEST NON-CDC: HCPCS | Mod: QW,S$GLB,, | Performed by: PHYSICIAN ASSISTANT

## 2022-07-27 PROCEDURE — 1160F PR REVIEW ALL MEDS BY PRESCRIBER/CLIN PHARMACIST DOCUMENTED: ICD-10-PCS | Mod: CPTII,S$GLB,, | Performed by: PHYSICIAN ASSISTANT

## 2022-07-27 PROCEDURE — 3074F SYST BP LT 130 MM HG: CPT | Mod: CPTII,S$GLB,, | Performed by: PHYSICIAN ASSISTANT

## 2022-07-27 PROCEDURE — 1159F PR MEDICATION LIST DOCUMENTED IN MEDICAL RECORD: ICD-10-PCS | Mod: CPTII,S$GLB,, | Performed by: PHYSICIAN ASSISTANT

## 2022-07-27 PROCEDURE — 3078F DIAST BP <80 MM HG: CPT | Mod: CPTII,S$GLB,, | Performed by: PHYSICIAN ASSISTANT

## 2022-07-27 PROCEDURE — U0002: ICD-10-PCS | Mod: QW,S$GLB,, | Performed by: PHYSICIAN ASSISTANT

## 2022-07-27 PROCEDURE — 3044F HG A1C LEVEL LT 7.0%: CPT | Mod: CPTII,S$GLB,, | Performed by: PHYSICIAN ASSISTANT

## 2022-07-27 PROCEDURE — 99214 PR OFFICE/OUTPT VISIT, EST, LEVL IV, 30-39 MIN: ICD-10-PCS | Mod: S$GLB,,, | Performed by: PHYSICIAN ASSISTANT

## 2022-07-27 PROCEDURE — 3078F PR MOST RECENT DIASTOLIC BLOOD PRESSURE < 80 MM HG: ICD-10-PCS | Mod: CPTII,S$GLB,, | Performed by: PHYSICIAN ASSISTANT

## 2022-07-27 PROCEDURE — 1159F MED LIST DOCD IN RCRD: CPT | Mod: CPTII,S$GLB,, | Performed by: PHYSICIAN ASSISTANT

## 2022-07-27 NOTE — PROGRESS NOTES
"Subjective:       Patient ID: Lily Green is a 71 y.o. female.    Vitals:  height is 5' 5" (1.651 m) and weight is 58.1 kg (128 lb). Her oral temperature is 98.8 °F (37.1 °C). Her blood pressure is 118/77 and her pulse is 69. Her respiration is 19 and oxygen saturation is 96%.     Chief Complaint: Cough    Ms. Green presents for evaluation of congestion, postnasal drip, sore throat that started last night.  She denies any known COVID-19 exposures.  She is vaccinated against COVID-19, but has not had a booster.  She denies any fevers, chills, shortness of breath, chest pain, leg swelling, nausea, vomiting, diarrhea, anosmia or ageusia.   She has taken zyrtec for symptoms with some relief.         Cough  This is a new problem. The current episode started yesterday. The problem has been gradually worsening. The problem occurs every few minutes. The cough is non-productive. Associated symptoms include nasal congestion, postnasal drip and a sore throat. Pertinent negatives include no chest pain, chills, ear congestion, ear pain, fever, headaches, myalgias, rash, shortness of breath or wheezing. Nothing aggravates the symptoms. Treatments tried: zyrtec. The treatment provided no relief. There is no history of asthma, bronchitis, COPD, emphysema or pneumonia.       Constitution: Negative for appetite change, chills, sweating, fatigue and fever.   HENT: Positive for postnasal drip and sore throat. Negative for ear pain, ear discharge, hearing loss, drooling, congestion, sinus pain and sinus pressure.    Neck: Negative for neck stiffness and painful lymph nodes.   Cardiovascular: Negative for chest trauma, chest pain, leg swelling, palpitations, sob on exertion and passing out.   Eyes: Negative for eye pain and blurred vision.   Respiratory: Positive for cough. Negative for chest tightness, sputum production, shortness of breath and wheezing.    Gastrointestinal: Negative for abdominal pain, nausea, vomiting " and diarrhea.   Genitourinary: Negative for dysuria, frequency and urgency.   Musculoskeletal: Negative for joint pain, joint swelling, muscle cramps and muscle ache.   Skin: Negative for rash.   Allergic/Immunologic: Negative for itching and sneezing.   Neurological: Negative for dizziness, history of vertigo, light-headedness, passing out, facial drooping, speech difficulty, coordination disturbances, loss of balance, headaches and altered mental status.   Hematologic/Lymphatic: Negative for swollen lymph nodes and easy bruising/bleeding. Does not bruise/bleed easily.   Psychiatric/Behavioral: Negative for altered mental status.       Objective:      Physical Exam   Constitutional: She is oriented to person, place, and time. She appears well-developed. She is cooperative.  Non-toxic appearance. She does not appear ill. No distress.   HENT:   Head: Normocephalic and atraumatic.   Ears:   Right Ear: Hearing, tympanic membrane, external ear and ear canal normal.   Left Ear: Hearing, tympanic membrane, external ear and ear canal normal.   Nose: Rhinorrhea present. No mucosal edema or nasal deformity. No epistaxis. Right sinus exhibits no maxillary sinus tenderness and no frontal sinus tenderness. Left sinus exhibits no maxillary sinus tenderness and no frontal sinus tenderness.   Mouth/Throat: Uvula is midline, oropharynx is clear and moist and mucous membranes are normal. No trismus in the jaw. Normal dentition. No uvula swelling. No oropharyngeal exudate, posterior oropharyngeal edema or posterior oropharyngeal erythema. No tonsillar exudate.   Eyes: Conjunctivae and lids are normal. No scleral icterus.   Neck: Trachea normal and phonation normal. Neck supple. No edema present. No erythema present. No neck rigidity present.   Cardiovascular: Normal rate, regular rhythm, normal heart sounds and normal pulses.   Pulmonary/Chest: Effort normal. No stridor. No respiratory distress. She has no decreased breath sounds.  She has no wheezes. She has no rhonchi. She has no rales.   Abdominal: Normal appearance.   Musculoskeletal: Normal range of motion.         General: No deformity. Normal range of motion.   Lymphadenopathy:     She has no cervical adenopathy.   Neurological: She is alert and oriented to person, place, and time. She exhibits normal muscle tone. Coordination normal.   Skin: Skin is warm, dry, intact, not diaphoretic and not pale.   Psychiatric: Her speech is normal and behavior is normal. Judgment and thought content normal.   Nursing note and vitals reviewed.          Results for orders placed or performed in visit on 07/27/22   POCT COVID-19 Rapid Screening   Result Value Ref Range    POC Rapid COVID Positive (A) Negative     Acceptable Yes        Assessment:       1. COVID-19 virus infection    2. Cough          Plan:         COVID-19 virus infection    Cough  -     POCT COVID-19 Rapid Screening    Other orders  -     nirmatrelvir-ritonavir 300 mg (150 mg x 2)-100 mg copackaged tablets (EUA); Take 3 tablets by mouth 2 (two) times daily for 5 days. Each dose contains 2 nirmatrelvir (pink tablets) and 1 ritonavir (white tablet). Take all 3 tablets together  Dispense: 30 tablet; Refill: 0    COVID Risk Score: 3    Antibody infusion is not available at this time per [facility] unless Paxlovid is directly contraindicated.  Entire medication list reviewed with patient.  Instructed to hold lipitor while taking paxlovid.  Most recent labs reviewed, reveal normal renal function.    Diagnoses and plan discussed with the patient, as well as the expected course and duration of her symptoms. All questions and concerns were addressed prior to discharge.  She was advised to follow up with her PCP within 1 week if symptoms do not improve. Emergency department precautions were given. Patient verbalized understanding and was happy with the plan of care.   Note dictated with voice recognition software,  please excuse any grammatical errors.    Patient Instructions   PLEASE READ YOUR DISCHARGE INSTRUCTIONS ENTIRELY AS IT CONTAINS IMPORTANT INFORMATION.  Please hold your atorvastatin while taking the paxlovid.  - Rest.    - Drink plenty of fluids.    - Tylenol or Ibuprofen as directed as needed for fever/pain.    - If you were prescribed antibiotics, please take them to completion.  - If you are female and on birth control pills - please use additional methods of contraception to prevent pregnancy while on antibiotics and for one cycle after.   - If you were prescribed a narcotic medication, a cough syrup, or a muscle relaxer, do not drive or operate heavy equipment or machinery while taking these medications, as they can cause drowsiness.   - If a referral to a specialty was made today, you should receive a phone call in the next few days to schedule an appt.  Please call [phone] to schedule an appt if have not gotten a phone call in the next few days.  - If you smoke, please stop smoking.  -You must understand that you've received an Urgent Care treatment only and that you may be released before all your medical problems are known or treated. You, the patient, will arrange for follow up care as instructed. Please arrange follow up with your primary medical clinic as soon as possible.   - Follow up with your PCP or specialty clinic as directed in the next 1-2 weeks if not improved or as needed.  You can call [phone] to schedule an appointment with the appropriate provider.    - Please return to Urgent Care or to the Emergency Department if your symptoms worsen.    Patient aware and verbalized understanding.    [facility] COVID QUARANTINE     If You Test Positive for COVID-19 (Isolate)  Everyone, regardless of vaccination status.    Stay home for 5 days.  If you have no symptoms or your symptoms are resolving after 5 days, you can leave your house.  Continue to wear a mask around others for 5 additional  days.  If you have a fever, continue to stay home until your fever resolves.    If You Were Exposed to Someone with COVID-19 (Quarantine)  If you:    Have been boosted  OR  Completed the primary series of Pfizer or Moderna vaccine within the last 6 months  OR  Completed the primary series of J&J vaccine within the last 2 months    Wear a mask around others for 10 days.  Test on day 5, if possible.  If you develop symptoms get a test and stay home.    If you:    Completed the primary series of Pfizer or Moderna vaccine over 6 months ago and are not boosted  OR  Completed the primary series of J&J over 2 months ago and are not boosted  OR  Are unvaccinated    Stay home for 5 days. After that continue to wear a mask around others for 5 additional days.  If you cant quarantine you must wear a mask for 10 days.  Test on day 5 if possible.  If you develop symptoms get a test and stay home    [place]  If COVID-19 Positive:   Do not travel until a full 10 days after your symptoms started or the date your positive test was taken if you had no symptoms.    If COVID negative, but exposed to COVID-19:  Do not travel until a full 5 days after your last close contact with the person with COVID-19. It is best to avoid travel for a full 10 days after your last exposure.  If you must travel during days 6 through 10 after your last exposure:  Get tested at least 5 days after your last close contact. Make sure your test result is negative and you remain without symptoms before traveling. If you dont get tested, avoid travel until a full 10 days after your last close contact with a person with COVID-19.  Properly wear a well-fitting mask when you are around others for the entire duration of travel during days 6 through 10. If you are unable to wear a mask, you should not travel during days 6 through 10.

## 2022-07-27 NOTE — PATIENT INSTRUCTIONS
PLEASE READ YOUR DISCHARGE INSTRUCTIONS ENTIRELY AS IT CONTAINS IMPORTANT INFORMATION.  Please hold your atorvastatin while taking the paxlovid.  - Rest.    - Drink plenty of fluids.    - Tylenol or Ibuprofen as directed as needed for fever/pain.    - If you were prescribed antibiotics, please take them to completion.  - If you are female and on birth control pills - please use additional methods of contraception to prevent pregnancy while on antibiotics and for one cycle after.   - If you were prescribed a narcotic medication, a cough syrup, or a muscle relaxer, do not drive or operate heavy equipment or machinery while taking these medications, as they can cause drowsiness.   - If a referral to a specialty was made today, you should receive a phone call in the next few days to schedule an appt.  Please call [phone] to schedule an appt if have not gotten a phone call in the next few days.  - If you smoke, please stop smoking.  -You must understand that you've received an Urgent Care treatment only and that you may be released before all your medical problems are known or treated. You, the patient, will arrange for follow up care as instructed. Please arrange follow up with your primary medical clinic as soon as possible.   - Follow up with your PCP or specialty clinic as directed in the next 1-2 weeks if not improved or as needed.  You can call [phone] to schedule an appointment with the appropriate provider.    - Please return to Urgent Care or to the Emergency Department if your symptoms worsen.    Patient aware and verbalized understanding.    [facility] COVID QUARANTINE     If You Test Positive for COVID-19 (Isolate)  Everyone, regardless of vaccination status.    Stay home for 5 days.  If you have no symptoms or your symptoms are resolving after 5 days, you can leave your house.  Continue to wear a mask around others for 5 additional days.  If you have a fever, continue to stay home until your fever  resolves.    If You Were Exposed to Someone with COVID-19 (Quarantine)  If you:    Have been boosted  OR  Completed the primary series of Pfizer or Moderna vaccine within the last 6 months  OR  Completed the primary series of J&J vaccine within the last 2 months    Wear a mask around others for 10 days.  Test on day 5, if possible.  If you develop symptoms get a test and stay home.    If you:    Completed the primary series of Pfizer or Moderna vaccine over 6 months ago and are not boosted  OR  Completed the primary series of J&J over 2 months ago and are not boosted  OR  Are unvaccinated    Stay home for 5 days. After that continue to wear a mask around others for 5 additional days.  If you cant quarantine you must wear a mask for 10 days.  Test on day 5 if possible.  If you develop symptoms get a test and stay home    [place]  If COVID-19 Positive:   Do not travel until a full 10 days after your symptoms started or the date your positive test was taken if you had no symptoms.    If COVID negative, but exposed to COVID-19:  Do not travel until a full 5 days after your last close contact with the person with COVID-19. It is best to avoid travel for a full 10 days after your last exposure.  If you must travel during days 6 through 10 after your last exposure:  Get tested at least 5 days after your last close contact. Make sure your test result is negative and you remain without symptoms before traveling. If you dont get tested, avoid travel until a full 10 days after your last close contact with a person with COVID-19.  Properly wear a well-fitting mask when you are around others for the entire duration of travel during days 6 through 10. If you are unable to wear a mask, you should not travel during days 6 through 10.

## 2022-08-17 ENCOUNTER — PATIENT MESSAGE (OUTPATIENT)
Dept: PAIN MEDICINE | Facility: CLINIC | Age: 72
End: 2022-08-17
Payer: MEDICARE

## 2022-08-17 ENCOUNTER — TELEPHONE (OUTPATIENT)
Dept: PAIN MEDICINE | Facility: CLINIC | Age: 72
End: 2022-08-17
Payer: MEDICARE

## 2022-08-17 NOTE — TELEPHONE ENCOUNTER
----- Message from Mckenzie Holcomb sent at 8/17/2022  8:46 AM CDT -----  Regarding: CAll BAck  Name of Who is Calling:MARIA LUISA RAJAN [[id number]]              What is the request in detail: Patient requesting a call back No details given Please assist              Can the clinic reply by [facility]: No              What Number to Call Back if not in [facility]:[phone]

## 2022-08-17 NOTE — TELEPHONE ENCOUNTER
Patient has been scheduled for procedure on 8/25. Time of arrival 1015 am.   Denies/Confirms blood thinners  Denies/Confirms pacemaker/ICD      · Check in at the registration desk on the first floor of the hospital. [address] [place] [address]  · Please DO NOT eat or drink 8 hours prior to your arrival time for the procedure, if diabetic 6 hours prior. If you are taking medications for blood pressure, heart medications, thyroid, cholesterol; you can take them with a small sip of water.  · Refrain from drinking alcohol within 24 hours prior to your procedure  · You will need someone to drive you home after procedure. You cannot take taxi, Uber, or Lyft.  · If you start feeling sick (fever, chills, or coughing) or start on any antibiotics please contact us at [phone] to reschedule.       Pt voiced understanding and confirmed procedure date and time.

## 2022-08-23 ENCOUNTER — PATIENT MESSAGE (OUTPATIENT)
Dept: PODIATRY | Facility: CLINIC | Age: 72
End: 2022-08-23
Payer: MEDICARE

## 2022-08-23 ENCOUNTER — TELEPHONE (OUTPATIENT)
Dept: PAIN MEDICINE | Facility: CLINIC | Age: 72
End: 2022-08-23
Payer: MEDICARE

## 2022-08-23 DIAGNOSIS — M79.672 BILATERAL FOOT PAIN: ICD-10-CM

## 2022-08-23 DIAGNOSIS — G60.9 IDIOPATHIC NEUROPATHY: Primary | ICD-10-CM

## 2022-08-23 DIAGNOSIS — M79.671 BILATERAL FOOT PAIN: ICD-10-CM

## 2022-08-24 ENCOUNTER — PATIENT MESSAGE (OUTPATIENT)
Dept: PAIN MEDICINE | Facility: HOSPITAL | Age: 72
End: 2022-08-24
Payer: MEDICARE

## 2022-08-24 ENCOUNTER — TELEPHONE (OUTPATIENT)
Dept: PAIN MEDICINE | Facility: CLINIC | Age: 72
End: 2022-08-24
Payer: MEDICARE

## 2022-08-24 NOTE — DISCHARGE INSTRUCTIONS
Home Care Instructions Pain Management:    1.  DIET:    You may resume your normal diet today.    2.  BATHING:    You may shower with luke warm water.    3.  DRESSING:    You may remove your bandage today.    4.  ACTIVITY LEVEL:      You may resume your normal activities 24 hours after your procedure.    5.  MEDICATIONS:    You may resume your normal medications today.    6.  SPECIAL INSTRUCTIONS:    No heat to the injection site for 24 hours including bath or shower, heating pad, moist heat or hot tubs.    Use an ice pack to the injection site for any pain or discomfort.  Apply ice packs for 20 minute intervals as needed.    If you have received any sedatives by mouth today, you can not drive for 12 hours.    If you have received sedation through an IV, you can not drive for 24 hours.    PLEASE CALL YOUR DOCTOR FOR THE FOLLOWIN.  Redness or swelling around the injection site.  2.  Fever of 101 degrees.  3.  Drainage (pus) from the injection site.  4.  For any continuous bleeding (some dried blood over the incision is normal.)    FOR EMERGENCIES:    If any unusual problems or difficulties occur during clinic hours, call [phone] or dial [phone].    Follow up with with your physician in 2-3 weeks.

## 2022-08-25 ENCOUNTER — HOSPITAL ENCOUNTER (OUTPATIENT)
Facility: HOSPITAL | Age: 72
Discharge: HOME | End: 2022-08-25
Attending: PAIN MEDICINE | Admitting: PAIN MEDICINE
Payer: MEDICARE

## 2022-08-25 VITALS
DIASTOLIC BLOOD PRESSURE: 73 MMHG | WEIGHT: 130 LBS | BODY MASS INDEX: 21.66 KG/M2 | SYSTOLIC BLOOD PRESSURE: 136 MMHG | HEIGHT: 65 IN | RESPIRATION RATE: 18 BRPM | OXYGEN SATURATION: 98 % | TEMPERATURE: 97 F | HEART RATE: 56 BPM

## 2022-08-25 DIAGNOSIS — G89.29 CHRONIC PAIN: ICD-10-CM

## 2022-08-25 DIAGNOSIS — G60.9 IDIOPATHIC NEUROPATHY: Primary | ICD-10-CM

## 2022-08-25 PROCEDURE — 64520 N BLOCK LUMBAR/THORACIC: CPT | Mod: 50 | Performed by: PAIN MEDICINE

## 2022-08-25 PROCEDURE — 25000003 PHARM REV CODE 250: Performed by: PAIN MEDICINE

## 2022-08-25 PROCEDURE — 64520 N BLOCK LUMBAR/THORACIC: CPT | Mod: 50,,, | Performed by: PAIN MEDICINE

## 2022-08-25 PROCEDURE — 63600175 PHARM REV CODE 636 W HCPCS: Performed by: PAIN MEDICINE

## 2022-08-25 PROCEDURE — 25500020 PHARM REV CODE 255: Performed by: PAIN MEDICINE

## 2022-08-25 PROCEDURE — 99152 MOD SED SAME PHYS/QHP 5/>YRS: CPT | Performed by: PAIN MEDICINE

## 2022-08-25 PROCEDURE — 64520 PR INJECT NERV BLCK,PARAVERT SYMPATH: ICD-10-PCS | Mod: 50,,, | Performed by: PAIN MEDICINE

## 2022-08-25 RX ORDER — LIDOCAINE HYDROCHLORIDE 10 MG/ML
1 INJECTION, SOLUTION EPIDURAL; INFILTRATION; INTRACAUDAL; PERINEURAL ONCE
Status: DISCONTINUED | OUTPATIENT
Start: 2022-08-25 | End: 2022-08-25 | Stop reason: HOSPADM

## 2022-08-25 RX ORDER — LIDOCAINE HYDROCHLORIDE 10 MG/ML
INJECTION INFILTRATION; PERINEURAL
Status: DISCONTINUED | OUTPATIENT
Start: 2022-08-25 | End: 2022-08-25 | Stop reason: HOSPADM

## 2022-08-25 RX ORDER — FENTANYL CITRATE 50 UG/ML
INJECTION, SOLUTION INTRAMUSCULAR; INTRAVENOUS
Status: DISCONTINUED | OUTPATIENT
Start: 2022-08-25 | End: 2022-08-25 | Stop reason: HOSPADM

## 2022-08-25 RX ORDER — SODIUM BICARBONATE 1 MEQ/ML
SYRINGE (ML) INTRAVENOUS
Status: DISCONTINUED | OUTPATIENT
Start: 2022-08-25 | End: 2022-08-25 | Stop reason: HOSPADM

## 2022-08-25 RX ORDER — MIDAZOLAM HYDROCHLORIDE 1 MG/ML
INJECTION, SOLUTION INTRAMUSCULAR; INTRAVENOUS
Status: DISCONTINUED | OUTPATIENT
Start: 2022-08-25 | End: 2022-08-25 | Stop reason: HOSPADM

## 2022-08-25 RX ORDER — SODIUM CHLORIDE 9 MG/ML
500 INJECTION, SOLUTION INTRAVENOUS CONTINUOUS
Status: DISCONTINUED | OUTPATIENT
Start: 2022-08-25 | End: 2022-08-25 | Stop reason: HOSPADM

## 2022-08-25 RX ORDER — DEXAMETHASONE SODIUM PHOSPHATE 100 MG/10ML
INJECTION INTRAMUSCULAR; INTRAVENOUS
Status: DISCONTINUED | OUTPATIENT
Start: 2022-08-25 | End: 2022-08-25 | Stop reason: HOSPADM

## 2022-08-25 RX ORDER — BUPIVACAINE HYDROCHLORIDE 2.5 MG/ML
INJECTION, SOLUTION EPIDURAL; INFILTRATION; INTRACAUDAL
Status: DISCONTINUED | OUTPATIENT
Start: 2022-08-25 | End: 2022-08-25 | Stop reason: HOSPADM

## 2022-08-25 RX ADMIN — SODIUM CHLORIDE 500 ML: 9 INJECTION, SOLUTION INTRAVENOUS at 10:08

## 2022-08-25 NOTE — OP NOTE
"Procedure Note    Pre-operative Diagnosis: Neuropathic pain, lower extremity  Post-operative Diagnosis: Neuropathic pain, lower extremity  Procedure:  (1) Lumbar Sympathetic Block, bilateral    (2) Intraoperative fluoroscopy    (3) IV Moderate Sedation (Midazolam 2 mg, Fentanyl 25 mcg)    Procedure Date: 08/25/2022  Anesthesia: None    Indications: To alleviate pain and suffering, and reduce functional impairment associated with neuropathic pain of the lower extremity.      The patients history and physical exam were reviewed. The risks (bleeding, intravascular injection, intrathecal or epidural injection, perforation of viscera, groin pain, genitofemoral nerve injury), benefits and alternatives to the procedure were discussed, and all questions were answered to the patients satisfaction. The patient agreed to proceed, and written informed consent was verified.    Procedure in Detail: Prior to transporting the patient to the procedure room, an IV fluid bolus of 250 mL was administered.  The patient was brought into the procedure room and placed in the prone position on the fluoroscopy table. The area of the lumbar spine was prepped with Chloraprep and draped in a sterile manner.  The L3 vertebral body was identified and the intensifier angled to square off the vertebral endplates at that level.  A 45 degree oblique view was obtained.  A point just below the right transverse process overlying the anterior margin of the vertebral body was marked on the skin.  This point and the tissues beneath it were anesthetized by infiltration with Lidocaine 2% 3-5 mL with a  1.25" 25G needle.  Next, a 22G 5" spine needle was inserted through the anesthetized skin and advanced toward the anterior edge of the L3 vertebral body under intermittent fluoroscopy.  Following contact with the vertebral body, lateral fluoroscopy images were used to incrementally advance the needle to the anterior margin.  Iodonated contrast was injected " demonstrating local accumulation.  Bupivacaine 0.25% 5 mL + Decadron 5 mg was administered in 2 mL increments over the next 10 minutes.    The procedure was then repeated on the left side.  Bupivacaine 0.25% 5 mL + Decadron 5 mg was administered in 2 mL increments over the next 10 minutes.    The needle was removed and a bandage applied to each puncture site.    An additional 250 mL of IVF was given post-procedure to guard against any potential decline in BP due to vaso-dilation in the legs.    Disposition: The patient tolerated the procedure well, and there were no apparent complications. Vital signs remained stable throughout the procedure. The patient was taken to the recovery area where written discharge instructions for the procedure were given.     Follow-up: RTC as scheduled      Souleymane Rizo Jr, MD  Interventional Pain Medicine / Anesthesiology

## 2022-08-25 NOTE — DISCHARGE SUMMARY
[facility]  Discharge Note  Short Stay     Admit Date: 8/25/2022    Discharge Date: 8/25/2022     Attending Physician: Souleymane Rizo Jr, MD    Diagnoses:  There are no hospital problems to display for this patient.    Discharged Condition: Good     Hospital Course: Patient was admitted for an outpatient interventional pain management procedure and tolerated the procedure well with no complications.     Final Diagnoses: Same as principal problem.     Disposition: Home or Self Care     Follow up/Patient Instructions:        Reconciled Medications:     Medication List      CHANGE how you take these medications    metFORMIN 500 MG tablet  Commonly known as: GLUCOPHAGE  Take 1 tablet (500 mg total) by mouth 3 (three) times daily. 2 po qam, 1 po qhs.  What changed:   · when to take this  · additional instructions        CONTINUE taking these medications    alpha lipoic acid 600 mg Cap     atorvastatin 40 MG tablet  Commonly known as: LIPITOR  TAKE 1 TABLET BY MOUTH ONCE DAILY     blood sugar diagnostic Strp  Commonly known as: TRUE METRIX GLUCOSE TEST STRIP  1 strip by Misc.(Non-Drug; Combo Route) route 2 (two) times a day.     blood-glucose meter kit  Use to test twice a day     CALTRATE WITH VITAMIN D3 ORAL  Take by mouth.     CINNAMON ORAL  Take by mouth.     coenzyme Q10 100 mg capsule     dicyclomine 10 MG capsule  Commonly known as: BENTYL     DULoxetine 30 MG capsule  Commonly known as: CYMBALTA  Take 1 capsule (30 mg total) by mouth once daily.     econazole nitrate 1 % cream  Apply topically 2 (two) times daily.     finasteride 5 mg tablet  Commonly known as: PROSCAR     flaxseed oiL 1,000 mg Cap  once a day     gluc-condr-om3-dha-epa-fish-st 858-194-46-54 mg Cap  once a day     glucosamine HCl 1,500 mg Tab  Take 1,500 mg by mouth.     KRILL OIL ORAL  Take by mouth.     lancets Misc  1 lancet by Misc.(Non-Drug; Combo Route) route once daily.     meclizine 25 mg tablet  Commonly known as:  ANTIVERT  Take 1 tablet (25 mg total) by mouth 3 (three) times daily as needed for Dizziness.     MILK THISTLE ORAL  Take by mouth.     pantoprazole 40 MG tablet  Commonly known as: PROTONIX  TAKE 1 TABLET BY MOUTH ONCE DAILY     pregabalin 50 MG capsule  Commonly known as: LYRICA  Take 1 capsule (50 mg total) by mouth 2 (two) times daily.     temazepam 30 mg capsule  Commonly known as: RESTORIL  Take 1 capsule (30 mg total) by mouth nightly as needed for Insomnia.     triamcinolone acetonide 0.1% 0.1 % ointment  Commonly known as: KENALOG  Apply topically 2 (two) times daily as needed (rash).     triamterene-hydrochlorothiazide 37.5-25 mg 37.5-25 mg per tablet  Commonly known as: MAXZIDE-25     vit C,R-Oa-shdsh-lutein-zeaxan 250-90-40-1 mg Cap  Commonly known as: PRESERVISION AREDS 2     vitamins A,C,E-zinc-copper 14,[id number] unit-mg-unit Cap  Take 1 tablet by mouth once daily.           Discharge Procedure Orders (must include Diet, Follow-up, Activity)   Diet Adult Regular     No driving until:   Order Comments: If you received sedation, no driving for 12 hrs     Remove dressing in 24 hours     Notify your health care provider if you experience any of the following:  temperature >100.4     Notify your health care provider if you experience any of the following:  severe uncontrolled pain     Notify your health care provider if you experience any of the following:  redness, tenderness, or signs of infection (pain, swelling, redness, odor or green/yellow discharge around incision site)     Notify your health care provider if you experience any of the following:  difficulty breathing or increased cough     Notify your health care provider if you experience any of the following:  severe persistent headache     Notify your health care provider if you experience any of the following:  increased confusion or weakness     Activity as tolerated       Souleymane Rizo Jr, MD  Interventional Pain Medicine /  Anesthesiology

## 2022-08-25 NOTE — H&P
[facility] (H[facility])  History & Physical - Short Stay  Pain Management           SUBJECTIVE:     Procedure: Procedure(s) (LRB):  BLOCK, LUMBAR SYMPATHETIC (N/A)    Chief Complaint/Reason for Admission:  Idiopathic neuropathy [G60.9]  Bilateral foot pain [M79.671, M79.672]    Patient presents today for repeat LSB after 7-8 months of bilateral foot pain relief.  She was very happy with the relief and noted improved tolerance of wearing shoes and walking on previous uncomfortable surfaces (cold tile).  Symptoms have returned to baseline.    PTA Medications   Medication Sig    alpha lipoic acid 600 mg Cap     atorvastatin (LIPITOR) 40 MG tablet TAKE 1 TABLET BY MOUTH ONCE DAILY    blood sugar diagnostic (TRUE METRIX GLUCOSE TEST STRIP) Strp 1 strip by Misc.(Non-Drug; Combo Route) route 2 (two) times a day.    calcium carbonate/vitamin D3 (CALTRATE WITH VITAMIN D3 ORAL) Take by mouth.    cinnamon bark (CINNAMON ORAL) Take by mouth.    coenzyme Q10 100 mg capsule     DULoxetine (CYMBALTA) 30 MG capsule Take 1 capsule (30 mg total) by mouth once daily.    finasteride (PROSCAR) 5 mg tablet     flaxseed oil 1,000 mg Cap once a day    gluc-condr-om3-dha-epa-fish-st 054-363-28-54 mg Cap once a day    glucosamine HCl 1,500 mg Tab Take 1,500 mg by mouth.    KRILL OIL ORAL Take by mouth.    lancets Misc 1 lancet by Misc.(Non-Drug; Combo Route) route once daily.    metFORMIN (GLUCOPHAGE) 500 MG tablet Take 1 tablet (500 mg total) by mouth 3 (three) times daily. 2 po qam, 1 po qhs. (Patient taking differently: Take 500 mg by mouth 2 (two) times daily. 1 po qam, 1 po qhs.)    MILK THISTLE ORAL Take by mouth.    pantoprazole (PROTONIX) 40 MG tablet TAKE 1 TABLET BY MOUTH ONCE DAILY     pregabalin (LYRICA) 50 MG capsule Take 1 capsule (50 mg total) by mouth 2 (two) times daily.    temazepam (RESTORIL) 30 mg capsule Take 1 capsule (30 mg total) by mouth nightly as needed for Insomnia.    triamcinolone  acetonide 0.1% (KENALOG) 0.1 % ointment Apply topically 2 (two) times daily as needed (rash).    triamterene-hydrochlorothiazide 37.5-25 mg (MAXZIDE-25) 37.5-25 mg per tablet     vit C,N-Pa-wqakb-lutein-zeaxan (PRESERVISION AREDS 2) 839-807-07-1 mg-unit-mg-mg Cap     vitamins  A,C,E-zinc-copper 14,[id number] unit-mg-unit Cap Take 1 tablet by mouth once daily.    blood-glucose meter kit Use to test twice a day    dicyclomine (BENTYL) 10 MG capsule     econazole nitrate 1 % cream Apply topically 2 (two) times daily.    meclizine (ANTIVERT) 25 mg tablet Take 1 tablet (25 mg total) by mouth 3 (three) times daily as needed for Dizziness.       Review of patient's allergies indicates:   Allergen Reactions    Crestor [rosuvastatin] Swelling    Gluten protein        Past Medical History:   Diagnosis Date    Anxiety     Celiac disease 2018    Celiac disease     Depression     Diabetes mellitus     Family history of breast cancer in mother      at age 68    Hyperlipidemia     Hypertension     Meniere disease     Menopause     Peptic ulcer     Reflux esophagitis     Urinary tract infection     Vaginal infection     Vaginal Pap smear 2014    Pap/Hpv Negative      Past Surgical History:   Procedure Laterality Date    APPENDECTOMY      BREAST SURGERY      Tags    CARPAL TUNNEL RELEASE Bilateral 2017    ,     COLONOSCOPY  2018    Normal  (Cornell Velazco)     DILATION AND CURETTAGE OF UTERUS  1986    DUPUYTREN CONTRACTURE RELEASE Bilateral 2017    HYSTERECTOMY  1987    BEAU w/ appy, no BSO     INJECTION OF NEUROLYTIC AGENT AROUND LUMBAR SYMPATHETIC NERVE N/A 2022    Procedure: BLOCK, LUMBAR SYMPATHETIC;  Surgeon: Souleymane Rizo Jr., MD;  Location: [facility];  Service: Pain Management;  Laterality: N/A;  no pacemaker   pt is diabetic     SHOULDER SURGERY   &     right rotator cuff    TONSILLECTOMY      UPPER GASTROINTESTINAL ENDOSCOPY  2018      Family History   Problem Relation Age of Onset    Vision loss Father     Breast cancer Mother     Cancer Mother     Vision loss Mother     Hyperlipidemia Sister     Breast cancer Paternal Aunt     Cancer Paternal Aunt     Diabetes Paternal Grandfather     Vision loss Sister     Kidney disease Neg Hx      Social History     Tobacco Use    Smoking status: Never Smoker    Smokeless tobacco: Never Used   Substance Use Topics    Alcohol use: No    Drug use: Never        Review of Systems:  Review of Systems   Constitutional: Negative for chills and fever.   HENT: Negative for nosebleeds.    Eyes: Negative for blurred vision and pain.   Respiratory: Negative for hemoptysis.    Cardiovascular: Negative for chest pain and palpitations.   Gastrointestinal: Negative for heartburn, nausea and vomiting.   Genitourinary: Negative for dysuria and hematuria.   Musculoskeletal: Negative for myalgias.   Skin: Negative for rash.   Neurological: Positive for tremors and sensory change (chronic). Negative for seizures and loss of consciousness.   Endo/Heme/Allergies: Does not bruise/bleed easily.   Psychiatric/Behavioral: Negative for hallucinations.         OBJECTIVE:     Vital Signs (Most Recent):  Temp: 97.2 °F (36.2 °C) (08/25/22 0947)  Pulse: 66 (08/25/22 0947)  Resp: 18 (08/25/22 0947)  BP: 131/88 (08/25/22 0947)  SpO2: 98 % (08/25/22 0947)    Physical Exam:  General appearance - alert, well appearing, and in no distress  Mental status - AOx3  Eyes - pupils equal and reactive, extraocular eye movements intact  Heart - normal rate, regular rhythm, normal S1, S2, no murmurs, rubs, clicks or gallops  Chest - clear to auscultation, no wheezes, rales or rhonchi, symmetric air entry  Abdomen - soft, nontender, nondistended, no masses or organomegaly  Neurological - alert, oriented, normal speech, no focal findings or movement disorder noted  Extremities - peripheral pulses normal, no pedal edema, no clubbing or  cyanosis      ASSESSMENT/PLAN:     Peripheral Neuropathy  Painful neuropathy of the feet     The risks and benefits of this intervention, and alternative therapies were discussed with the patient.  The discussion of risks included infection, bleeding, need for additional procedures or surgery, nerve damage, paralysis, adverse medication reaction(s), stroke, and/or death.  Questions regarding the procedure, risks, expected outcome, and possible side effects were solicited and answered to the patient's satisfaction.  Raisa wishes to proceed with the injection.  Verbal and written consent were verified.      Proceed with intervention as scheduled.      Souleymane Rizo Jr, MD  Interventional Pain Medicine / Anesthesiology

## 2022-08-25 NOTE — PLAN OF CARE
"Received fluid bolus of 500mL NS; Patient monitored additional 15 minutes. Patient verbalized "feeling fine." Denies any shortness of breath, discomfort, or weakness. Patient AAOx3, exhibits stability upon standing  "

## 2022-08-25 NOTE — INTERVAL H&P NOTE
The patient was examined and no significant changes were noted from the updated H&P or last clinic note.    The risks and benefits of this procedure, including alternative therapies, were discussed with the patient.  The discussion of risks included infection, bleeding, need for additional procedures or surgery, nerve damage, paralysis, adverse medication reaction(s), stroke, and if appropriate for the procedure, death.  Questions regarding the procedure, risks, expected outcome, and possible side effects were solicited and answered to Raisa's satisfaction.  Raisa Norma wishes to proceed with the injection or procedure as confirmed by written consent.    Souleymane Rizo Jr, MD  Interventional Pain Medicine / Anesthesiology

## 2022-08-27 DIAGNOSIS — F51.01 PRIMARY INSOMNIA: ICD-10-CM

## 2022-08-27 NOTE — TELEPHONE ENCOUNTER
Care Due:                  Date            Visit Type   Department     Provider  --------------------------------------------------------------------------------                                ESTABLISHED                              PATIENT      [facility] PRIMARY  Last Visit: 04-      [facility]        CARE           Pavel Deleon                              EP -                              PRIMARY      [facility] PRIMARY  Next Visit: 11-      CARE ([facility])   CARE           Pavel Deleon                                                            Last  Test          Frequency    Reason                     Performed    Due Date  --------------------------------------------------------------------------------    HBA1C.......  6 months...  metFORMIN................  04-   10-    [facility] Embedded Care Gaps. Reference number: [id number]. 8/27/2022   9:12:08 AM CDT

## 2022-08-29 RX ORDER — TEMAZEPAM 30 MG/1
30 CAPSULE ORAL NIGHTLY PRN
Qty: 30 CAPSULE | Refills: 2 | Status: SHIPPED | OUTPATIENT
Start: 2022-08-29 | End: 2022-11-21

## 2022-08-30 ENCOUNTER — OFFICE VISIT (OUTPATIENT)
Dept: PODIATRY | Facility: CLINIC | Age: 72
End: 2022-08-30
Payer: MEDICARE

## 2022-08-30 DIAGNOSIS — B35.1 ONYCHOMYCOSIS DUE TO DERMATOPHYTE: ICD-10-CM

## 2022-08-30 DIAGNOSIS — M20.42 HAMMER TOES OF BOTH FEET: ICD-10-CM

## 2022-08-30 DIAGNOSIS — R20.2 PARESTHESIA OF BOTH FEET: ICD-10-CM

## 2022-08-30 DIAGNOSIS — M20.41 HAMMER TOES OF BOTH FEET: ICD-10-CM

## 2022-08-30 DIAGNOSIS — E11.42 DIABETIC POLYNEUROPATHY ASSOCIATED WITH TYPE 2 DIABETES MELLITUS: Primary | ICD-10-CM

## 2022-08-30 PROCEDURE — 1101F PR PT FALLS ASSESS DOC 0-1 FALLS W/OUT INJ PAST YR: ICD-10-PCS | Mod: CPTII,S$GLB,, | Performed by: PODIATRIST

## 2022-08-30 PROCEDURE — 3044F PR MOST RECENT HEMOGLOBIN A1C LEVEL <7.0%: ICD-10-PCS | Mod: CPTII,S$GLB,, | Performed by: PODIATRIST

## 2022-08-30 PROCEDURE — 99999 PR PBB SHADOW E&M-EST. PATIENT-LVL III: CPT | Mod: PBBFAC,,, | Performed by: PODIATRIST

## 2022-08-30 PROCEDURE — 1125F AMNT PAIN NOTED PAIN PRSNT: CPT | Mod: CPTII,S$GLB,, | Performed by: PODIATRIST

## 2022-08-30 PROCEDURE — 3288F PR FALLS RISK ASSESSMENT DOCUMENTED: ICD-10-PCS | Mod: CPTII,S$GLB,, | Performed by: PODIATRIST

## 2022-08-30 PROCEDURE — 1159F MED LIST DOCD IN RCRD: CPT | Mod: CPTII,S$GLB,, | Performed by: PODIATRIST

## 2022-08-30 PROCEDURE — 3044F HG A1C LEVEL LT 7.0%: CPT | Mod: CPTII,S$GLB,, | Performed by: PODIATRIST

## 2022-08-30 PROCEDURE — 1159F PR MEDICATION LIST DOCUMENTED IN MEDICAL RECORD: ICD-10-PCS | Mod: CPTII,S$GLB,, | Performed by: PODIATRIST

## 2022-08-30 PROCEDURE — 99499 UNLISTED E&M SERVICE: CPT | Mod: S$GLB,,, | Performed by: PODIATRIST

## 2022-08-30 PROCEDURE — 11721 PR DEBRIDEMENT OF NAILS, 6 OR MORE: ICD-10-PCS | Mod: Q9,S$GLB,, | Performed by: PODIATRIST

## 2022-08-30 PROCEDURE — 11721 DEBRIDE NAIL 6 OR MORE: CPT | Mod: Q9,S$GLB,, | Performed by: PODIATRIST

## 2022-08-30 PROCEDURE — 99499 NO LOS: ICD-10-PCS | Mod: S$GLB,,, | Performed by: PODIATRIST

## 2022-08-30 PROCEDURE — 1101F PT FALLS ASSESS-DOCD LE1/YR: CPT | Mod: CPTII,S$GLB,, | Performed by: PODIATRIST

## 2022-08-30 PROCEDURE — 99999 PR PBB SHADOW E&M-EST. PATIENT-LVL III: ICD-10-PCS | Mod: PBBFAC,,, | Performed by: PODIATRIST

## 2022-08-30 PROCEDURE — 1125F PR PAIN SEVERITY QUANTIFIED, PAIN PRESENT: ICD-10-PCS | Mod: CPTII,S$GLB,, | Performed by: PODIATRIST

## 2022-08-30 PROCEDURE — 3288F FALL RISK ASSESSMENT DOCD: CPT | Mod: CPTII,S$GLB,, | Performed by: PODIATRIST

## 2022-08-31 ENCOUNTER — PATIENT MESSAGE (OUTPATIENT)
Dept: PAIN MEDICINE | Facility: CLINIC | Age: 72
End: 2022-08-31
Payer: MEDICARE

## 2022-08-31 DIAGNOSIS — Z78.0 MENOPAUSE: ICD-10-CM

## 2022-08-31 NOTE — PROGRESS NOTES
Subjective:      Patient ID: Lily Green is a 72 y.o. female.    Chief Complaint: Follow-up and Nail Care    Lily Kline is a 72 y.o. female who presents to the clinic for evaluation and treatment of diabetic feet. Lily Kline has a past medical history of Anxiety, Celiac disease (05/2018), Celiac disease, Depression, Diabetes mellitus, Family history of breast cancer in mother, Hyperlipidemia, Hypertension, Meniere disease, Menopause, Peptic ulcer, Reflux esophagitis, Urinary tract infection, Vaginal infection, and Vaginal Pap smear (04/07/2014). Relates having toenails toenails that are in need of trimming.  Denies pain from the nails with today's exam, and has not attempted to self treat. Notes neuropathy pain continues to be considerable.  She recently had a steroid injection, per Pain Management, with less than 24 hours worth of relief.  Inquires as to potential options to better address said neuropathy symptoms.  Denies any additional pedal complaints.    PCP: Pavel Calixto MD    Date Last Seen by PCP: 4/22  Hemoglobin A1C   Date Value Ref Range Status   04/22/2022 6.0 (H) 4.0 - 5.6 % Final     Comment:     ADA Screening Guidelines:  5.7-6.4%  Consistent with prediabetes  >or=6.5%  Consistent with diabetes    High levels of fetal hemoglobin interfere with the HbA1C  assay. Heterozygous hemoglobin variants (HbS, HgC, etc)do  not significantly interfere with this assay.   However, presence of multiple variants may affect accuracy.     10/19/2021 6.2 (H) 4.0 - 5.6 % Final     Comment:     ADA Screening Guidelines:  5.7-6.4%  Consistent with prediabetes  >or=6.5%  Consistent with diabetes    High levels of fetal hemoglobin interfere with the HbA1C  assay. Heterozygous hemoglobin variants (HbS, HgC, etc)do  not significantly interfere with this assay.   However, presence of multiple variants may affect accuracy.     04/20/2021 6.1 (H) 4.0 - 5.6 % Final     Comment:     ADA Screening  Guidelines:  5.7-6.4%  Consistent with prediabetes  >or=6.5%  Consistent with diabetes    High levels of fetal hemoglobin interfere with the HbA1C  assay. Heterozygous hemoglobin variants (HbS, HgC, etc)do  not significantly interfere with this assay.   However, presence of multiple variants may affect accuracy.     10/23/2019 6.8 % Final         Past Medical History:   Diagnosis Date    Anxiety     Celiac disease 2018    Celiac disease     Depression     Diabetes mellitus     Family history of breast cancer in mother      at age 68    Hyperlipidemia     Hypertension     Meniere disease     Menopause     Peptic ulcer     Reflux esophagitis     Urinary tract infection     Vaginal infection     Vaginal Pap smear 2014    Pap/Hpv Negative        Past Surgical History:   Procedure Laterality Date    APPENDECTOMY      BREAST SURGERY      Tags    CARPAL TUNNEL RELEASE Bilateral 2017    ,     COLONOSCOPY  2018    Normal  (Cornell Velazco)     DILATION AND CURETTAGE OF UTERUS  1986    DUPUYTREN CONTRACTURE RELEASE Bilateral 2017    HYSTERECTOMY  1987    BEAU w/ appy, no BSO     INJECTION OF NEUROLYTIC AGENT AROUND LUMBAR SYMPATHETIC NERVE N/A 2022    Procedure: BLOCK, LUMBAR SYMPATHETIC;  Surgeon: Souleymane Rizo Jr., MD;  Location: [facility] PAIN T;  Service: Pain Management;  Laterality: N/A;  no pacemaker   pt is diabetic     INJECTION OF NEUROLYTIC AGENT AROUND LUMBAR SYMPATHETIC NERVE N/A 2022    Procedure: BLOCK, LUMBAR SYMPATHETIC;  Surgeon: Souleymane Rizo Jr., MD;  Location: [facility] PAIN T;  Service: Pain Management;  Laterality: N/A;  diabetic     SHOULDER SURGERY   &     right rotator cuff    TONSILLECTOMY      UPPER GASTROINTESTINAL ENDOSCOPY  2018       Family History   Problem Relation Age of Onset    Vision loss Father     Breast cancer Mother     Cancer Mother     Vision loss Mother     Hyperlipidemia Sister     Breast cancer Paternal Aunt     Cancer Paternal  Aunt     Diabetes Paternal Grandfather     Vision loss Sister     Kidney disease Neg Hx        Social History     Socioeconomic History    Marital status:    Tobacco Use    Smoking status: Never    Smokeless tobacco: Never   Substance and Sexual Activity    Alcohol use: No    Drug use: Never    Sexual activity: Not Currently     Partners: Male     Birth control/protection: See Surgical Hx     Comment: :      Social Determinants of Health     Financial Resource Strain: Low Risk     Difficulty of Paying Living Expenses: Not hard at all   Food Insecurity: No Food Insecurity    Worried About Running Out of Food in the Last Year: Never true    Ran Out of Food in the Last Year: Never true   Transportation Needs: No Transportation Needs    Lack of Transportation (Medical): No    Lack of Transportation (Non-Medical): No   Physical Activity: Sufficiently Active    Days of Exercise per Week: 6 days    Minutes of Exercise per Session: 40 min   Stress: Stress Concern Present    Feeling of Stress : Very much   Social Connections: Unknown    Frequency of Communication with Friends and Family: More than three times a week    Frequency of Social Gatherings with Friends and Family: More than three times a week    Active Member of Clubs or Organizations: Yes    Attends Club or Organization Meetings: More than 4 times per year    Marital Status:    Housing Stability: Low Risk     Unable to Pay for Housing in the Last Year: No    Number of Places Lived in the Last Year: 1    Unstable Housing in the Last Year: No       Current Outpatient Medications   Medication Sig Dispense Refill    alpha lipoic acid 600 mg Cap       atorvastatin (LIPITOR) 40 MG tablet TAKE 1 TABLET BY MOUTH ONCE DAILY 90 tablet 3    blood sugar diagnostic (TRUE METRIX GLUCOSE TEST STRIP) Strp 1 strip by Misc.(Non-Drug; Combo Route) route 2 (two) times a day. 200 each 3    calcium carbonate/vitamin D3 (CALTRATE WITH VITAMIN D3 ORAL) Take by  mouth.      cinnamon bark (CINNAMON ORAL) Take by mouth.      coenzyme Q10 100 mg capsule       dicyclomine (BENTYL) 10 MG capsule       DULoxetine (CYMBALTA) 30 MG capsule Take 1 capsule (30 mg total) by mouth once daily. 30 capsule 11    econazole nitrate 1 % cream Apply topically 2 (two) times daily. 30 g 2    finasteride (PROSCAR) 5 mg tablet       flaxseed oil 1,000 mg Cap once a day      gluc-condr-om3-dha-epa-fish-st 673-781-62-54 mg Cap once a day      glucosamine HCl 1,500 mg Tab Take 1,500 mg by mouth.      KRILL OIL ORAL Take by mouth.      lancets Misc 1 lancet by Misc.(Non-Drug; Combo Route) route once daily. 200 each 3    meclizine (ANTIVERT) 25 mg tablet Take 1 tablet (25 mg total) by mouth 3 (three) times daily as needed for Dizziness. 20 tablet 0    metFORMIN (GLUCOPHAGE) 500 MG tablet Take 1 tablet (500 mg total) by mouth 3 (three) times daily. 2 po qam, 1 po qhs. (Patient taking differently: Take 500 mg by mouth 2 (two) times daily. 1 po qam, 1 po qhs.) 270 tablet 3    MILK THISTLE ORAL Take by mouth.      pantoprazole (PROTONIX) 40 MG tablet TAKE 1 TABLET BY MOUTH ONCE DAILY  90 tablet 3    pregabalin (LYRICA) 50 MG capsule Take 1 capsule (50 mg total) by mouth 2 (two) times daily. 60 capsule 6    temazepam (RESTORIL) 30 mg capsule TAKE 1 CAPSULE (30 MG TOTAL) BY MOUTH NIGHTLY AS NEEDED FOR INSOMNIA. 30 capsule 2    triamcinolone acetonide 0.1% (KENALOG) 0.1 % ointment Apply topically 2 (two) times daily as needed (rash). 80 g 11    triamterene-hydrochlorothiazide 37.5-25 mg (MAXZIDE-25) 37.5-25 mg per tablet       vit C,M-Uu-djbtp-lutein-zeaxan (PRESERVISION AREDS 2) 740-031-19-1 mg-unit-mg-mg Cap       vitamins  A,C,E-zinc-copper 14,320-226-200 unit-mg-unit Cap Take 1 tablet by mouth once daily.      blood-glucose meter kit Use to test twice a day 1 each 0     No current facility-administered medications for this visit.       Review of patient's allergies indicates:   Allergen Reactions     Crestor [rosuvastatin] Swelling    Gluten protein          Review of Systems   Constitutional: Negative for chills and fever.   Cardiovascular:  Negative for claudication and leg swelling.   Skin:  Positive for color change and nail changes. Negative for suspicious lesions.   Musculoskeletal:  Positive for joint pain. Negative for joint swelling, muscle cramps and muscle weakness.   Gastrointestinal:  Negative for nausea and vomiting.   Neurological:  Positive for numbness and paresthesias.   Psychiatric/Behavioral:  Negative for altered mental status.          Objective:      Physical Exam  Constitutional:       General: She is not in acute distress.     Appearance: She is well-developed. She is not diaphoretic.   Cardiovascular:      Pulses:           Dorsalis pedis pulses are 2+ on the right side and 2+ on the left side.        Posterior tibial pulses are 1+ on the right side and 1+ on the left side.      Comments: CFT is < 3 seconds bilateral.  Pedal hair growth is decreased bilateral.  Mild non pitting lower extremity edema noted bilateral.  Toes are cool to touch bilateral.  Musculoskeletal:         General: Deformity present. No tenderness.      Comments: Muscle strength 5/5 in all muscle groups bilateral.   (-) for pain with palpation of bilateral foot and ankle. Semi-reducible contracture of toes 2-5 bilateral with adductovarus rotation of the 5th.   Skin:     General: Skin is warm and dry.      Capillary Refill: Capillary refill takes less than 2 seconds.      Coloration: Skin is not pale.      Findings: No abrasion, bruising, burn, ecchymosis, erythema, lesion, petechiae or rash.      Comments: Pedal skin appears thin bilateral.  Toenails x 10 appear thickened by 2 mm, elongated by 4 mm, and discolored with subungual debris.     Neurological:      Mental Status: She is alert and oriented to person, place, and time.      Sensory: Sensory deficit present.      Motor: No weakness or atrophy.      Comments:  Protective sensation per [place] monofilament is decreased bilateral.  Vibratory sensation is decreased bilateral.  Light touch is intact bilateral.             Assessment:       Encounter Diagnoses   Name Primary?    Diabetic polyneuropathy associated with type 2 diabetes mellitus Yes    Hammer toes of both feet     Onychomycosis due to dermatophyte     Paresthesia of both feet            Plan:       Lily Kline was seen today for follow-up and nail care.    Diagnoses and all orders for this visit:    Diabetic polyneuropathy associated with type 2 diabetes mellitus    Hammer toes of both feet    Onychomycosis due to dermatophyte    Paresthesia of both feet      I counseled the patient on her conditions, their implications and medical management.    To continue with daily application of CBD oil to the LEs.      Recommend contacting Pain Management, as the past epidural injection yielded only 24 hours worth of relief.      To continue wearing shoe gear that minimizes pressure to the digital deformities.    Shoe inspection. Diabetic Foot Education. Patient reminded of the importance of good nutrition and blood sugar control to help prevent podiatric complications of diabetes. Patient instructed on proper foot hygeine. We discussed wearing proper shoe gear, daily foot inspections, never walking without protective shoe gear, never putting sharp instruments to feet    With patient's permission, nails were aggressively reduced and debrided x 10 to their soft tissue attachment mechanically and with electric , removing all offending nail and debris.  Patient relates relief following the procedure. She will continue to monitor the areas daily, inspect her feet, wear protective shoe gear when ambulatory, moisturizer to maintain skin integrity and follow in this office in approximately 3 months, sooner p.r.n.    Follow up in about 3 months (around 11/30/2022).    Lucas Bryan DPM

## 2022-09-06 NOTE — TELEPHONE ENCOUNTER
Patient may be a candidate for trial of SCS.    Souleymane Rizo Jr, MD  Interventional Pain Medicine / Anesthesiology

## 2022-09-08 ENCOUNTER — OFFICE VISIT (OUTPATIENT)
Dept: PAIN MEDICINE | Facility: CLINIC | Age: 72
End: 2022-09-08
Payer: MEDICARE

## 2022-09-08 VITALS
BODY MASS INDEX: 21.64 KG/M2 | DIASTOLIC BLOOD PRESSURE: 78 MMHG | WEIGHT: 130.06 LBS | SYSTOLIC BLOOD PRESSURE: 134 MMHG | HEART RATE: 71 BPM

## 2022-09-08 DIAGNOSIS — G60.9 IDIOPATHIC NEUROPATHY: ICD-10-CM

## 2022-09-08 DIAGNOSIS — F33.0 MILD RECURRENT MAJOR DEPRESSION: ICD-10-CM

## 2022-09-08 DIAGNOSIS — M79.2 NEUROPATHIC PAIN: ICD-10-CM

## 2022-09-08 DIAGNOSIS — M79.672 BILATERAL FOOT PAIN: Primary | ICD-10-CM

## 2022-09-08 DIAGNOSIS — G89.4 CHRONIC PAIN SYNDROME: ICD-10-CM

## 2022-09-08 DIAGNOSIS — M79.671 BILATERAL FOOT PAIN: Primary | ICD-10-CM

## 2022-09-08 PROCEDURE — 3044F HG A1C LEVEL LT 7.0%: CPT | Mod: CPTII,S$GLB,, | Performed by: NURSE PRACTITIONER

## 2022-09-08 PROCEDURE — 1159F MED LIST DOCD IN RCRD: CPT | Mod: CPTII,S$GLB,, | Performed by: NURSE PRACTITIONER

## 2022-09-08 PROCEDURE — 99214 OFFICE O/P EST MOD 30 MIN: CPT | Mod: S$GLB,,, | Performed by: NURSE PRACTITIONER

## 2022-09-08 PROCEDURE — 99999 PR PBB SHADOW E&M-EST. PATIENT-LVL V: ICD-10-PCS | Mod: PBBFAC,,, | Performed by: NURSE PRACTITIONER

## 2022-09-08 PROCEDURE — 3078F PR MOST RECENT DIASTOLIC BLOOD PRESSURE < 80 MM HG: ICD-10-PCS | Mod: CPTII,S$GLB,, | Performed by: NURSE PRACTITIONER

## 2022-09-08 PROCEDURE — 1160F RVW MEDS BY RX/DR IN RCRD: CPT | Mod: CPTII,S$GLB,, | Performed by: NURSE PRACTITIONER

## 2022-09-08 PROCEDURE — 3044F PR MOST RECENT HEMOGLOBIN A1C LEVEL <7.0%: ICD-10-PCS | Mod: CPTII,S$GLB,, | Performed by: NURSE PRACTITIONER

## 2022-09-08 PROCEDURE — 1160F PR REVIEW ALL MEDS BY PRESCRIBER/CLIN PHARMACIST DOCUMENTED: ICD-10-PCS | Mod: CPTII,S$GLB,, | Performed by: NURSE PRACTITIONER

## 2022-09-08 PROCEDURE — 3078F DIAST BP <80 MM HG: CPT | Mod: CPTII,S$GLB,, | Performed by: NURSE PRACTITIONER

## 2022-09-08 PROCEDURE — 1125F PR PAIN SEVERITY QUANTIFIED, PAIN PRESENT: ICD-10-PCS | Mod: CPTII,S$GLB,, | Performed by: NURSE PRACTITIONER

## 2022-09-08 PROCEDURE — 1159F PR MEDICATION LIST DOCUMENTED IN MEDICAL RECORD: ICD-10-PCS | Mod: CPTII,S$GLB,, | Performed by: NURSE PRACTITIONER

## 2022-09-08 PROCEDURE — 1125F AMNT PAIN NOTED PAIN PRSNT: CPT | Mod: CPTII,S$GLB,, | Performed by: NURSE PRACTITIONER

## 2022-09-08 PROCEDURE — 3008F PR BODY MASS INDEX (BMI) DOCUMENTED: ICD-10-PCS | Mod: CPTII,S$GLB,, | Performed by: NURSE PRACTITIONER

## 2022-09-08 PROCEDURE — 3075F PR MOST RECENT SYSTOLIC BLOOD PRESS GE 130-139MM HG: ICD-10-PCS | Mod: CPTII,S$GLB,, | Performed by: NURSE PRACTITIONER

## 2022-09-08 PROCEDURE — 99214 PR OFFICE/OUTPT VISIT, EST, LEVL IV, 30-39 MIN: ICD-10-PCS | Mod: S$GLB,,, | Performed by: NURSE PRACTITIONER

## 2022-09-08 PROCEDURE — 99999 PR PBB SHADOW E&M-EST. PATIENT-LVL V: CPT | Mod: PBBFAC,,, | Performed by: NURSE PRACTITIONER

## 2022-09-08 PROCEDURE — 3008F BODY MASS INDEX DOCD: CPT | Mod: CPTII,S$GLB,, | Performed by: NURSE PRACTITIONER

## 2022-09-08 PROCEDURE — 3075F SYST BP GE 130 - 139MM HG: CPT | Mod: CPTII,S$GLB,, | Performed by: NURSE PRACTITIONER

## 2022-09-08 RX ORDER — DULOXETIN HYDROCHLORIDE 30 MG/1
30 CAPSULE, DELAYED RELEASE ORAL 2 TIMES DAILY
Qty: 60 CAPSULE | Refills: 11 | Status: ON HOLD | OUTPATIENT
Start: 2022-09-08 | End: 2023-03-02 | Stop reason: HOSPADM

## 2022-09-08 NOTE — PROGRESS NOTES
[facility]    Referred by: No ref. provider found   Reason for referral: * No diagnoses found *     CC:   Chief Complaint   Patient presents with    Foot Pain     Bilateral        Interval Updates:  9/8/2022 -  Norma returns to clinic s/p Lumbar sympathetic block on 8/25 with 100% relief for one day.  She reports burning, tingling and Aching of the both feet (location) pain rated 3/10 today with a weekly range of 3-4/10.      Interval Updates:   7/6/2022   Norma returns to clinic s/p TPI on 6/1/22 with 80% relief.  She reports a pain intensity 2/10 today with a weekly range of 2-3/10.       Interval Updates:   06/01/2022 - Ms. Green is following up for bilateral foot pain and mediation refill..  She requests a refill of Lyrica (pregabalin) which are working well to control Her pain.  She reports ?% relief with the current medication regimen.  Medication side effects: none.  Pain is currently rate 3/10 with a weekly range 3-4/10.  It is described as Aching, Tingling and Numb.   Pain is worsened by: daily activity and improved by: massage.  She notes 70-80% relief of bilateral foot pain since the bilateral lumbar sympathetic block 1/6/22 lasting up until May 2022.  During that time she reports using none of the topical medications she was previously using, but has now restarted.    12/16/21 - Ms. Green is following up for bilateral foot pain. Pain is currently rate 2/10 with a weekly range 2-4/10.  Patient continues to complain of bilateral foot pain she was previously started on Lyrica 25 mg t.i.d. which she continues to take she reports no adverse side effects however she reports no significant relief with this medication.  Subjective:   Lily Green is a 72 y.o. female who has a past medical history of Anxiety, Celiac disease, Celiac disease, Depression, Diabetes mellitus, Family history of breast cancer in mother, Hyperlipidemia, Hypertension,  Meniere disease, Menopause, Peptic ulcer, Reflux esophagitis, Urinary tract infection, Vaginal infection, and Vaginal Pap smear. She complains of pain as described below.    Location: bilateral feet   Onset: pain presents for several years but pain has been moderate to severe for about a year; especially bad since Oct 2021  Radiation: None  Timing: persistent  Current Pain Score: 2/10  Weekly Pain Range: 2-3/10  Quality: Burning, Tingling and Numb  Worsened by: walking for more than 10 minutes  Improved by: medications  Limitations: walking, crossing legs    Previous Therapies:  PT/OT:   HEP:   TENS:   Interventions:   -8/25/2022- Lumbar Sympathetic Block, bilateral 100% relief one day   -6/1/2022 Right periscapular TPI 80%   1/6/22 - Bilat LSB with 70-80% relief for 5 months  Surgery: None relevant  Opioids: No  Adjuvants: Yes, herbal substances; Gabapentin gave no relief at 600 mg TID  Denies: duloxetine    Current Pain Medications:  OTC supplements to address nerve pain  CBD oil - topical   [employer] topical oral works  Duloxetine 30 mg daily  Pregabalin 50 mg BID    Assessment & Plan:  Problem List Items Addressed This Visit          Neuro    Idiopathic neuropathy    Relevant Medications    DULoxetine (CYMBALTA) 30 MG capsule    Other Relevant Orders    Ambulatory referral/consult to Psychology       Psychiatric    Mild recurrent major depression    Relevant Medications    DULoxetine (CYMBALTA) 30 MG capsule    Other Relevant Orders    Ambulatory referral/consult to Psychology       Orthopedic    Bilateral foot pain - Primary    Relevant Medications    DULoxetine (CYMBALTA) 30 MG capsule    Other Relevant Orders    Ambulatory referral/consult to Psychology     Other Visit Diagnoses       Chronic pain syndrome        Neuropathic pain                11/17/2021 - this is a 71-year-old female with well-controlled type 2 diabetes, degenerative disc disease, chronic knee pain, and celiac disease who  presents complaining of progressive numbness and tingling starting in the toes and moving proximally on the legs over the course of the past few months to years.  Patient was advised that the underlying cause of her neuropathy is not quite clear because her type 2 diabetes has been well controlled.  Her neuropathy could be related to a vitamin deficiency caused by celiac disease, it could be related to type 2 diabetes, and it could even be idiopathic.  Regardless of the underlying cause, I have had good success in the past treating patients with a combination of pregabalin and duloxetine.  She is currently taking venlafaxine to treat depression which may be optimal for her mental health but is suboptimal for the treatment of peripheral neuropathy.  Nevertheless, we can start with a trial of pregabalin 25 mg t.i.d. a plan to titrate to affect.  Should a trial of duloxetine be required in the future, I will reach out to her primary care physician, Dr. Calixto, for assistance with the transition.    12/16/2021-Lily Green is a 72 y.o. female who  has a past medical history of Anxiety, Celiac disease (05/2018), Celiac disease, Depression, Diabetes mellitus, Family history of breast cancer in mother, Hyperlipidemia, Hypertension, Meniere disease, Menopause, Peptic ulcer, Reflux esophagitis, Urinary tract infection, Vaginal infection, and Vaginal Pap smear (04/07/2014).  By history and examination this patient has chronic bilateral foot pain etiology is still unclear at this point.  She was previously started on pregabalin 25 mg t.i.d. with minimal relief, recommended we follow Dr. Rizo plan and transition her from Effexor to duloxetine see if this would provide her added benefit to her bilateral foot pain.  Discussed I will send a message to Dr. Calixto recommending a transition from Effexor to duloxetine in addition to her pregabalin.  Once patient has transition to duloxetine discussed that she will  follow up with our office in 4-6 weeks to discuss effectiveness of medication.    6/1/22 - patient responded extremely well to a bilateral lumbar sympathetic block and we can repeat that in the future as needed.  She is taking pregabalin and duloxetine.  Her dose of pregabalin is quite low, and I recommend increasing to 50 mg b.i.d. in response to her stated request to have better relief.  Pain relief from the lumbar sympathetic block appears to last approximately 5 months, and has added benefits of eliminating the need for the myriad topical medications she uses in the past.  She has returned to those topical medications because the injection has worn off.  Lastly, she inquired about a trigger point injection for the right periscapular region as she reports dealing with significant muscle based pain over the past few years following a right rotator cuff surgical repair.  I am more than happy to provide that today.    7/6/[id number]-ugxu-old female with a history of bilateral foot pain and parascapular pain.  She was provided trigger point injections for parascapular pain last clinic visit and this provided her with 80% relief her pain score today was 2/10.  She has been provided lumbar sympathetic blocks in the past which have helped her bilateral foot pain, she reports her pain being stable at this point discussed planned below.    9/8/2022- 71 y/o female  with a Hx of bilateral foot pain likely related to neuropathy however this is unclear as she has well controlled controlled type 2 diabetes. She was recently provided a LSB that provided 0% relief for one day. Her and I discussed that we will first optimize medications for now, she will meet with Dr. Chiang in Nov to discuss SCS therapy.     Increase Duloxetine 30 mg daily to BID totally 60 mg daily, new Rx today   Increase Pregabalin  50 mg BID to 75 mg BID=150 mg daily  HE sheet to help with mid back pain   Recommended continuing HE plan ie walking.   Neuro  psych evaluation referral today    Follow Up:  w/Dr. Rizo in Nov    Jose Rainey, NP-C  [facility]      Disclaimer: This note was partly generated using dictation software which may occasionally result in transcription errors.    Imaging:  NA    Review of Systems:  Review of Systems   Constitutional:  Negative for chills and fever.   HENT:  Negative for nosebleeds.    Eyes:  Negative for blurred vision and pain.   Respiratory:  Negative for hemoptysis.    Cardiovascular:  Negative for chest pain and palpitations.   Gastrointestinal:  Negative for heartburn, nausea and vomiting.   Genitourinary:  Negative for dysuria and hematuria.   Musculoskeletal:  Positive for joint pain. Negative for myalgias.   Skin:  Negative for rash.   Neurological:  Positive for tingling and sensory change. Negative for seizures and loss of consciousness.   Endo/Heme/Allergies:  Does not bruise/bleed easily.   Psychiatric/Behavioral:  Positive for depression. Negative for hallucinations.      Physical Exam:  There were no vitals filed for this visit.    General    Nursing note and vitals reviewed.  Constitutional: She is oriented to person, place, and time. She appears well-developed and well-nourished. No distress.   HENT:   Head: Normocephalic and atraumatic.   Nose: Nose normal.   Eyes: Conjunctivae and EOM are normal. Pupils are equal, round, and reactive to light. Right eye exhibits no discharge. Left eye exhibits no discharge. No scleral icterus.   Neck: No JVD present.   Cardiovascular:  Intact distal pulses.            Pulmonary/Chest: Effort normal. No respiratory distress.   Abdominal: She exhibits no distension.   Neurological: She is alert and oriented to person, place, and time. Coordination normal.   Psychiatric: She has a normal mood and affect. Her behavior is normal. Judgment and thought content normal.     General Musculoskeletal Exam   Gait: normal     Right Ankle/Foot Exam     Inspection   Scars:  absent  Deformity: absent  Erythema: absent  Atrophy:  Foot - absent    Muscle Strength   The patient has normal right ankle strength.    Other   Sensation: decreased    Left Ankle/Foot Exam     Inspection  Deformity: absent  Erythema: absent  Atrophy:  Foot - absent  Scars: absent    Muscle Strength   The patient has normal left ankle strength.    Other   Sensation: decreased      Vascular Exam     Right Pulses  Dorsalis Pedis:      2+  Posterior Tibial:      2+        Left Pulses  Dorsalis Pedis:      2+  Posterior Tibial:      2+        Edema  Right Lower Leg: absent  Left Lower Leg: absent

## 2022-09-09 RX ORDER — PREGABALIN 75 MG/1
75 CAPSULE ORAL 2 TIMES DAILY PRN
Qty: 60 CAPSULE | Refills: 0 | Status: SHIPPED | OUTPATIENT
Start: 2022-09-09 | End: 2022-10-10 | Stop reason: SDUPTHER

## 2022-09-16 ENCOUNTER — PATIENT MESSAGE (OUTPATIENT)
Dept: INTERNAL MEDICINE | Facility: CLINIC | Age: 72
End: 2022-09-16
Payer: MEDICARE

## 2022-09-16 ENCOUNTER — PATIENT MESSAGE (OUTPATIENT)
Dept: PAIN MEDICINE | Facility: CLINIC | Age: 72
End: 2022-09-16
Payer: MEDICARE

## 2022-11-03 ENCOUNTER — OFFICE VISIT (OUTPATIENT)
Dept: INTERNAL MEDICINE | Facility: CLINIC | Age: 72
End: 2022-11-03
Payer: MEDICARE

## 2022-11-03 VITALS
BODY MASS INDEX: 22.27 KG/M2 | OXYGEN SATURATION: 98 % | SYSTOLIC BLOOD PRESSURE: 110 MMHG | DIASTOLIC BLOOD PRESSURE: 60 MMHG | HEART RATE: 70 BPM | WEIGHT: 133.69 LBS | HEIGHT: 65 IN

## 2022-11-03 DIAGNOSIS — M54.2 CHRONIC NECK PAIN: ICD-10-CM

## 2022-11-03 DIAGNOSIS — H81.03 MENIERE'S DISEASE OF BOTH EARS: ICD-10-CM

## 2022-11-03 DIAGNOSIS — E53.8 B12 DEFICIENCY: ICD-10-CM

## 2022-11-03 DIAGNOSIS — G89.29 CHRONIC NECK PAIN: ICD-10-CM

## 2022-11-03 DIAGNOSIS — F33.0 MILD RECURRENT MAJOR DEPRESSION: ICD-10-CM

## 2022-11-03 DIAGNOSIS — K21.9 GERD WITHOUT ESOPHAGITIS: ICD-10-CM

## 2022-11-03 DIAGNOSIS — E78.2 HYPERLIPIDEMIA, MIXED: ICD-10-CM

## 2022-11-03 DIAGNOSIS — E11.42 TYPE 2 DIABETES MELLITUS WITH DIABETIC POLYNEUROPATHY, WITHOUT LONG-TERM CURRENT USE OF INSULIN: Primary | ICD-10-CM

## 2022-11-03 DIAGNOSIS — K90.0 CELIAC DISEASE: ICD-10-CM

## 2022-11-03 DIAGNOSIS — F51.01 PRIMARY INSOMNIA: ICD-10-CM

## 2022-11-03 PROCEDURE — 1159F PR MEDICATION LIST DOCUMENTED IN MEDICAL RECORD: ICD-10-PCS | Mod: CPTII,S$GLB,, | Performed by: INTERNAL MEDICINE

## 2022-11-03 PROCEDURE — 3078F DIAST BP <80 MM HG: CPT | Mod: CPTII,S$GLB,, | Performed by: INTERNAL MEDICINE

## 2022-11-03 PROCEDURE — 99214 PR OFFICE/OUTPT VISIT, EST, LEVL IV, 30-39 MIN: ICD-10-PCS | Mod: S$GLB,,, | Performed by: INTERNAL MEDICINE

## 2022-11-03 PROCEDURE — 3044F PR MOST RECENT HEMOGLOBIN A1C LEVEL <7.0%: ICD-10-PCS | Mod: CPTII,S$GLB,, | Performed by: INTERNAL MEDICINE

## 2022-11-03 PROCEDURE — 3078F PR MOST RECENT DIASTOLIC BLOOD PRESSURE < 80 MM HG: ICD-10-PCS | Mod: CPTII,S$GLB,, | Performed by: INTERNAL MEDICINE

## 2022-11-03 PROCEDURE — 3008F PR BODY MASS INDEX (BMI) DOCUMENTED: ICD-10-PCS | Mod: CPTII,S$GLB,, | Performed by: INTERNAL MEDICINE

## 2022-11-03 PROCEDURE — 1159F MED LIST DOCD IN RCRD: CPT | Mod: CPTII,S$GLB,, | Performed by: INTERNAL MEDICINE

## 2022-11-03 PROCEDURE — 3066F PR DOCUMENTATION OF TREATMENT FOR NEPHROPATHY: ICD-10-PCS | Mod: CPTII,S$GLB,, | Performed by: INTERNAL MEDICINE

## 2022-11-03 PROCEDURE — 3061F NEG MICROALBUMINURIA REV: CPT | Mod: CPTII,S$GLB,, | Performed by: INTERNAL MEDICINE

## 2022-11-03 PROCEDURE — 3044F HG A1C LEVEL LT 7.0%: CPT | Mod: CPTII,S$GLB,, | Performed by: INTERNAL MEDICINE

## 2022-11-03 PROCEDURE — 3008F BODY MASS INDEX DOCD: CPT | Mod: CPTII,S$GLB,, | Performed by: INTERNAL MEDICINE

## 2022-11-03 PROCEDURE — 99999 PR PBB SHADOW E&M-EST. PATIENT-LVL V: ICD-10-PCS | Mod: PBBFAC,,, | Performed by: INTERNAL MEDICINE

## 2022-11-03 PROCEDURE — 99214 OFFICE O/P EST MOD 30 MIN: CPT | Mod: S$GLB,,, | Performed by: INTERNAL MEDICINE

## 2022-11-03 PROCEDURE — 99999 PR PBB SHADOW E&M-EST. PATIENT-LVL V: CPT | Mod: PBBFAC,,, | Performed by: INTERNAL MEDICINE

## 2022-11-03 PROCEDURE — 3074F SYST BP LT 130 MM HG: CPT | Mod: CPTII,S$GLB,, | Performed by: INTERNAL MEDICINE

## 2022-11-03 PROCEDURE — 1126F PR PAIN SEVERITY QUANTIFIED, NO PAIN PRESENT: ICD-10-PCS | Mod: CPTII,S$GLB,, | Performed by: INTERNAL MEDICINE

## 2022-11-03 PROCEDURE — 3061F PR NEG MICROALBUMINURIA RESULT DOCUMENTED/REVIEW: ICD-10-PCS | Mod: CPTII,S$GLB,, | Performed by: INTERNAL MEDICINE

## 2022-11-03 PROCEDURE — 3066F NEPHROPATHY DOC TX: CPT | Mod: CPTII,S$GLB,, | Performed by: INTERNAL MEDICINE

## 2022-11-03 PROCEDURE — 1126F AMNT PAIN NOTED NONE PRSNT: CPT | Mod: CPTII,S$GLB,, | Performed by: INTERNAL MEDICINE

## 2022-11-03 PROCEDURE — 3074F PR MOST RECENT SYSTOLIC BLOOD PRESSURE < 130 MM HG: ICD-10-PCS | Mod: CPTII,S$GLB,, | Performed by: INTERNAL MEDICINE

## 2022-11-03 RX ORDER — CYANOCOBALAMIN 1000 UG/ML
1000 INJECTION, SOLUTION INTRAMUSCULAR; SUBCUTANEOUS
Qty: 10 ML | Refills: 3 | Status: SHIPPED | OUTPATIENT
Start: 2022-11-03 | End: 2023-11-22

## 2022-11-03 NOTE — PROGRESS NOTES
[facility] Note    Chief Complaint      Chief Complaint   Patient presents with    Diabetes     6 month f/u       History of Present Illness      Lily Green is a 72 y.o. female with chronic conditions of DM2 with neuropathy, HLD, GERD, celiac disease, Meniere's disease, depression, insomnia, chronic neck pain who presents today for: follow up chronic conditions.  Reports falling while walking her dog and suffering abrasion to left anterior knee.  Symptoms have resolved.  Had MALI with Dr. Rizo recently which significantly improved symptoms for 2-3 days but then pain returned.  DM2 with neuropathy: Controlled on metformin.  Recent A1C 5.8, previously 6.0.  Eye exam UTD with Dr. Rivera 12/2020.  Foot exam UTD with Dr. Bryan previously.  HLD: Controlled on lipitor.  LDL 64, stable from last check.    GERD: Controlled on pantoprazole.  No new or worsening symptoms.  Depression: Controlled on effexor.  No new or worsening symptoms  Insomnia: Controlled on restoril.  Control is satisfactory on this regimen.  Chronic neck pain 2/2 cervical degenerative disc disease: Sees Dr. Rizo but will need. No new exacerbations.  Off gabapentin 2/2 no perceived benefit.  Using combination of vitamin supplement and topical neuropathy treatment for feet.  Had lumbar sympathetic nerve block which was effective for a few months.  Plans to follow back up for another attempt.   Celiac disease, IBS: previously saw Dr. Amor.  Avoiding triggers to avoid exacerbations.  Doing well with diet control. Saw Dr. Hutton who started on dicyclomine which has helped.  Taking miralax.    Meniere's disease: Controlled  Flu shot missed last season.  TdAP 2015.  Prevnar UTD, pneumovax UTD.  Shingrix discussed.  COVID vaccine UTD, due for booster.  Mammogram scheduled in 2 weeks with Dr. Serrano.  DEXA 2015, normal.  Cscope 2018, Dr. Amor, no polyps, 5 yr interval.    Past Medical History:  Past Medical History:    Diagnosis Date    Anxiety     Celiac disease 2018    Celiac disease     Depression     Diabetes mellitus     Family history of breast cancer in mother      at age 68    Hyperlipidemia     Hypertension     Meniere disease     Menopause     Peptic ulcer     Reflux esophagitis     Urinary tract infection     Vaginal infection     Vaginal Pap smear 2014    Pap/Hpv Negative        Past Surgical History:   has a past surgical history that includes Tonsillectomy; Appendectomy; Dilation and curettage of uterus (); Hysterectomy (); Carpal tunnel release (Bilateral, 2017); Shoulder surgery ( & ); Colonoscopy (2018); Upper gastrointestinal endoscopy (2018); Dupuytren contracture release (Bilateral, 2017); Breast surgery; Injection of neurolytic agent around lumbar sympathetic nerve (N/A, 2022); and Injection of neurolytic agent around lumbar sympathetic nerve (N/A, 2022).    Family History:  family history includes Arthritis in her father; Breast cancer in her mother and paternal aunt; Cancer in her mother and paternal aunt; Diabetes in her paternal grandfather; Hyperlipidemia in her sister; Vision loss in her father, mother, and sister.     Social History:  Social History     Tobacco Use    Smoking status: Never    Smokeless tobacco: Never   Substance Use Topics    Alcohol use: No    Drug use: Never       I personally reviewed all past medical, surgical, social and family history.    Review of Systems   Constitutional:  Negative for chills, fever and malaise/fatigue.   Respiratory:  Negative for shortness of breath.    Cardiovascular:  Negative for chest pain.   Gastrointestinal:  Negative for constipation, diarrhea, nausea and vomiting.   Skin:  Negative for rash.   Neurological:  Negative for weakness.   All other systems reviewed and are negative.     Medications:  Outpatient Encounter Medications as of 11/3/2022   Medication Sig Note Dispense Refill    betahistine  HCl (BETAHISTINE, BULK, MISC)        cetirizine HCl (ZYRTEC ORAL)        alpha lipoic acid 600 mg Cap        atorvastatin (LIPITOR) 40 MG tablet TAKE 1 TABLET BY MOUTH ONCE DAILY  90 tablet 3    blood sugar diagnostic (TRUE METRIX GLUCOSE TEST STRIP) Strp 1 strip by Misc.(Non-Drug; Combo Route) route 2 (two) times a day.  200 each 3    blood-glucose meter kit Use to test twice a day  1 each 0    calcium carbonate/vitamin D3 (CALTRATE WITH VITAMIN D3 ORAL) Take by mouth.       cinnamon bark (CINNAMON ORAL) Take by mouth.       coenzyme Q10 100 mg capsule        cyanocobalamin 1,000 mcg/mL injection Inject 1 mL (1,000 mcg total) into the muscle every 30 days.  10 mL 3    dicyclomine (BENTYL) 10 MG capsule        DULoxetine (CYMBALTA) 30 MG capsule Take 1 capsule (30 mg total) by mouth 2 (two) times daily.  60 capsule 11    econazole nitrate 1 % cream Apply topically 2 (two) times daily.  30 g 2    finasteride (PROSCAR) 5 mg tablet        flaxseed oil 1,000 mg Cap once a day 10/23/2017: Received from: [facility]      gluc-condr-om3-dha-epa-fish-st [phone] mg Cap once a day 10/23/2017: Received from: [facility]      glucosamine HCl 1,500 mg Tab Take 1,500 mg by mouth.       KRILL OIL ORAL Take by mouth.       lancets Misc 1 lancet by Misc.(Non-Drug; Combo Route) route once daily.  200 each 3    meclizine (ANTIVERT) 25 mg tablet Take 1 tablet (25 mg total) by mouth 3 (three) times daily as needed for Dizziness.  20 tablet 0    metFORMIN (GLUCOPHAGE) 500 MG tablet Take 1 tablet (500 mg total) by mouth 3 (three) times daily. 2 po qam, 1 po qhs. (Patient taking differently: Take 500 mg by mouth 2 (two) times daily. 1 po qam, 1 po qhs.)  270 tablet 3    MILK THISTLE ORAL Take by mouth.       pantoprazole (PROTONIX) 40 MG tablet TAKE 1 TABLET BY MOUTH ONCE DAILY   90 tablet 3    pregabalin (LYRICA) 75 MG capsule Take 1 capsule  "(75 mg total) by mouth 2 (two) times daily.  60 capsule 2    temazepam (RESTORIL) 30 mg capsule TAKE 1 CAPSULE (30 MG TOTAL) BY MOUTH NIGHTLY AS NEEDED FOR INSOMNIA.  30 capsule 2    triamcinolone acetonide 0.1% (KENALOG) 0.1 % ointment Apply topically 2 (two) times daily as needed (rash).  80 g 11    triamterene-hydrochlorothiazide 37.5-25 mg (MAXZIDE-25) 37.5-25 mg per tablet        vit C,K-Xp-pjjpw-lutein-zeaxan (PRESERVISION AREDS 2) 681-181-26-1 mg-unit-mg-mg Cap        vitamins  A,C,E-zinc-copper 14,[phone] unit-mg-unit Cap Take 1 tablet by mouth once daily.        No facility-administered encounter medications on file as of 11/3/2022.       Allergies:  Review of patient's allergies indicates:   Allergen Reactions    Crestor [rosuvastatin] Swelling    Gluten protein        Health Maintenance:  Immunization History   Administered Date(s) Administered    COVID-19, MRNA, LN-S, PF (Pfizer) (Purple Cap) 02/13/2021, 03/06/2021    Influenza 11/30/2012    Influenza (FLUAD) - Quadrivalent - Adjuvanted - PF *Preferred* (65+) 10/03/2020    Influenza Split 10/15/2019    Pneumococcal Conjugate - 13 Valent 04/27/2018    Pneumococcal Polysaccharide - 23 Valent 10/14/2019      Health Maintenance   Topic Date Due    Hepatitis C Screening  Never done    TETANUS VACCINE  Never done    DEXA Scan  04/07/2017    Mammogram  11/08/2022    Eye Exam  12/07/2022    Foot Exam  02/07/2023    Hemoglobin A1c  05/01/2023    Low Dose Statin  09/08/2023    Lipid Panel  11/01/2023        Physical Exam      Vital Signs  Pulse: 70  SpO2: 98 %  BP: 110/60  BP Location: Right arm  Patient Position: Sitting  Pain Score: 0-No pain  Height and Weight  Height: 5' 5" (165.1 cm)  Weight: 60.6 kg (133 lb 11.3 oz)  BSA (Calculated - sq m): 1.67 sq meters  BMI (Calculated): 22.3  Weight in (lb) to have BMI = 25: 149.9]    Physical Exam  Vitals reviewed.   Constitutional:       Appearance: She is well-developed.   HENT:      Head: Normocephalic and " atraumatic.      Right Ear: External ear normal.      Left Ear: External ear normal.   Cardiovascular:      Rate and Rhythm: Normal rate and regular rhythm.      Heart sounds: Normal heart sounds. No murmur heard.  Pulmonary:      Effort: Pulmonary effort is normal.      Breath sounds: Normal breath sounds. No wheezing or rales.   Abdominal:      General: Bowel sounds are normal. There is no distension.      Palpations: Abdomen is soft.      Tenderness: There is no abdominal tenderness.        Laboratory:  CBC:  Recent Labs   Lab 10/20/20  0842 10/19/21  0907   WBC 6.45 6.40   RBC 3.90 L 4.11   Hemoglobin 12.3 13.1   Hematocrit 38.3 39.5   Platelets 285 318   MCV 98 96   MCH 31.5 H 31.9 H   MCHC 32.1 33.2     CMP:  Recent Labs   Lab 10/19/21  0908 04/22/22  0910 11/01/22  0901   Glucose 137 H 115 H 125 H   Calcium 10.2 9.2 9.5   Albumin 4.7 4.4 4.7   Total Protein 8.5 H 8.1 8.9 H   Sodium 139 137 131 L   Potassium 4.8 4.6 4.2   CO2 31 H 30 H 29   Chloride 100 96 93 L   BUN 28 H 32 H 26 H   Alkaline Phosphatase 114 111 113   ALT 34 29 23   AST 34 44 36   Total Bilirubin 0.5 0.4 0.6     URINALYSIS:  Recent Labs   Lab 12/23/20  1344 10/11/21  1457   Color, UA Yellow Yellow   Clarity, UA Clear  --    Specific Gravity, UA  --  1.020   Spec Grav UA 1.020  --    pH, UA 5.0 6.0   Protein, UA  --  Negative   Nitrite, UA - Negative   Leukocytes, UA  --  Negative   Urobilinogen, UA 0.2 Negative      LIPIDS:  Recent Labs   Lab 10/19/21  0908 04/22/22  0910 11/01/22  0901   HDL 58 52 54   Cholesterol 194 138 149   Triglycerides 160 H 108 90   LDL Cholesterol 104.0 64.4 77.0   HDL/Cholesterol Ratio 29.9 37.7 36.2   Non-HDL Cholesterol 136 86 95   Total Cholesterol/HDL Ratio 3.3 2.7 2.8     TSH:      A1C:  Recent Labs   Lab 04/15/20  0907 10/20/20  0842 04/20/21  0754 10/19/21  0907 04/22/22  0910 11/01/22  0901   Hemoglobin A1C 6.2 H 6.0 H 6.1 H 6.2 H 6.0 H 5.8 H       Assessment/Plan     Lily Green is a 72  y.o.female with:    1. Type 2 diabetes mellitus with diabetic polyneuropathy, without long-term current use of insulin  Continue current meds.  Reviewed labs.  2. Hyperlipidemia, mixed  Continue current meds.    3. GERD without esophagitis  Continue current meds.    4. Mild recurrent major depression  Continue current meds.    5. Primary insomnia  Continue current meds.    6. Celiac disease  Controlling with diet  7. Chronic neck pain  Continue current meds as needed  8. Meniere's disease of both ears  Stable.  9. B12 deficiency  - cyanocobalamin 1,000 mcg/mL injection; Inject 1 mL (1,000 mcg total) into the muscle every 30 days.  Dispense: 10 mL; Refill: 3   Trying B12 injections for fatigue.    Chronic conditions status updated as per HPI.  Other than changes above, cont current medications and maintain follow up with specialists.  No follow-ups on file.    Future Appointments   Date Time Provider Department Center   11/30/2022  1:00 PM Souleymane Rizo Jr., MD [facility]   12/13/2022  2:20 PM Lucas Bryan DPM [facility] P[facility] [place]   2/20/2023  2:15 PM Raz Serrano MD [facility]   5/4/2023 11:30 AM Pavel Calixto MD [facility]       Pavel Calixto MD  [facility]

## 2022-11-07 ENCOUNTER — TELEPHONE (OUTPATIENT)
Dept: PAIN MEDICINE | Facility: CLINIC | Age: 72
End: 2022-11-07
Payer: MEDICARE

## 2022-11-07 NOTE — TELEPHONE ENCOUNTER
----- Message from Sonia eTmple sent at 11/7/2022  9:10 AM CST -----  Name of Who is Calling: MARIA LUISA RAJAN [[id number]]           What is the request in detail: Patient is requesting a call back to schedule an appointment to discuss pain stimulator. Please assist.           Can the clinic reply by [facility]: NO           What Number to Call Back [facility]: [phone]

## 2022-12-07 ENCOUNTER — TELEPHONE (OUTPATIENT)
Dept: OBSTETRICS AND GYNECOLOGY | Facility: CLINIC | Age: 72
End: 2022-12-07
Payer: MEDICARE

## 2022-12-07 NOTE — TELEPHONE ENCOUNTER
Yudelka SWEET Staff 7 hours ago (8:58 AM)     DT  Bone patient calling need orders for ultrasound. Patient had ultrasound and mammo at the beginning of the year and was instructed to come back in six month too repeat. This is performed at [facility]. Patient would like a call to discuss.

## 2022-12-09 ENCOUNTER — TELEPHONE (OUTPATIENT)
Dept: OBSTETRICS AND GYNECOLOGY | Facility: CLINIC | Age: 72
End: 2022-12-09
Payer: MEDICARE

## 2022-12-09 DIAGNOSIS — R92.8 FOLLOW-UP EXAMINATION OF ABNORMAL MAMMOGRAM: Primary | ICD-10-CM

## 2022-12-09 NOTE — TELEPHONE ENCOUNTER
Cristina SWEET Staff 48 minutes ago (10:31 AM)     JR Serrano patient calling need orders for ultrasound. Patient had ultrasound and mammo at the beginning of the year and was instructed to come back in six month too repeat. This [place]. Patient would like a call to discuss.      Lily Green [phone]  Cristina Crandall 49 minutes ago (10:30 AM

## 2022-12-09 NOTE — TELEPHONE ENCOUNTER
Spoke with pt notified sending [facility] orders for diagnostic mammo & u/s.    Scheduled to see  in Feb.

## 2022-12-12 LAB
LEFT EYE DM RETINOPATHY: NEGATIVE
RIGHT EYE DM RETINOPATHY: NEGATIVE

## 2022-12-13 ENCOUNTER — OFFICE VISIT (OUTPATIENT)
Dept: PODIATRY | Facility: CLINIC | Age: 72
End: 2022-12-13
Payer: MEDICARE

## 2022-12-13 DIAGNOSIS — E11.42 DIABETIC POLYNEUROPATHY ASSOCIATED WITH TYPE 2 DIABETES MELLITUS: Primary | ICD-10-CM

## 2022-12-13 DIAGNOSIS — B35.1 ONYCHOMYCOSIS DUE TO DERMATOPHYTE: ICD-10-CM

## 2022-12-13 DIAGNOSIS — M20.41 HAMMER TOES OF BOTH FEET: ICD-10-CM

## 2022-12-13 DIAGNOSIS — M20.42 HAMMER TOES OF BOTH FEET: ICD-10-CM

## 2022-12-13 DIAGNOSIS — L84 CORN OR CALLUS: ICD-10-CM

## 2022-12-13 DIAGNOSIS — R20.2 PARESTHESIA OF BOTH FEET: ICD-10-CM

## 2022-12-13 PROCEDURE — 1125F AMNT PAIN NOTED PAIN PRSNT: CPT | Mod: CPTII,S$GLB,, | Performed by: PODIATRIST

## 2022-12-13 PROCEDURE — 1125F PR PAIN SEVERITY QUANTIFIED, PAIN PRESENT: ICD-10-PCS | Mod: CPTII,S$GLB,, | Performed by: PODIATRIST

## 2022-12-13 PROCEDURE — 1159F PR MEDICATION LIST DOCUMENTED IN MEDICAL RECORD: ICD-10-PCS | Mod: CPTII,S$GLB,, | Performed by: PODIATRIST

## 2022-12-13 PROCEDURE — 99999 PR PBB SHADOW E&M-EST. PATIENT-LVL I: CPT | Mod: PBBFAC,,, | Performed by: PODIATRIST

## 2022-12-13 PROCEDURE — 3044F HG A1C LEVEL LT 7.0%: CPT | Mod: CPTII,S$GLB,, | Performed by: PODIATRIST

## 2022-12-13 PROCEDURE — 99499 NO LOS: ICD-10-PCS | Mod: S$GLB,,, | Performed by: PODIATRIST

## 2022-12-13 PROCEDURE — 99499 UNLISTED E&M SERVICE: CPT | Mod: S$GLB,,, | Performed by: PODIATRIST

## 2022-12-13 PROCEDURE — 3061F NEG MICROALBUMINURIA REV: CPT | Mod: CPTII,S$GLB,, | Performed by: PODIATRIST

## 2022-12-13 PROCEDURE — 1100F PR PT FALLS ASSESS DOC 2+ FALLS/FALL W/INJURY/YR: ICD-10-PCS | Mod: CPTII,S$GLB,, | Performed by: PODIATRIST

## 2022-12-13 PROCEDURE — 3288F PR FALLS RISK ASSESSMENT DOCUMENTED: ICD-10-PCS | Mod: CPTII,S$GLB,, | Performed by: PODIATRIST

## 2022-12-13 PROCEDURE — 1159F MED LIST DOCD IN RCRD: CPT | Mod: CPTII,S$GLB,, | Performed by: PODIATRIST

## 2022-12-13 PROCEDURE — 11721 PR DEBRIDEMENT OF NAILS, 6 OR MORE: ICD-10-PCS | Mod: 59,Q9,S$GLB, | Performed by: PODIATRIST

## 2022-12-13 PROCEDURE — 3066F PR DOCUMENTATION OF TREATMENT FOR NEPHROPATHY: ICD-10-PCS | Mod: CPTII,S$GLB,, | Performed by: PODIATRIST

## 2022-12-13 PROCEDURE — 3061F PR NEG MICROALBUMINURIA RESULT DOCUMENTED/REVIEW: ICD-10-PCS | Mod: CPTII,S$GLB,, | Performed by: PODIATRIST

## 2022-12-13 PROCEDURE — 99999 PR PBB SHADOW E&M-EST. PATIENT-LVL I: ICD-10-PCS | Mod: PBBFAC,,, | Performed by: PODIATRIST

## 2022-12-13 PROCEDURE — 11056 PARNG/CUTG B9 HYPRKR LES 2-4: CPT | Mod: Q9,S$GLB,, | Performed by: PODIATRIST

## 2022-12-13 PROCEDURE — 11721 DEBRIDE NAIL 6 OR MORE: CPT | Mod: 59,Q9,S$GLB, | Performed by: PODIATRIST

## 2022-12-13 PROCEDURE — 11056 PR TRIM BENIGN HYPERKERATOTIC SKIN LESION,2-4: ICD-10-PCS | Mod: Q9,S$GLB,, | Performed by: PODIATRIST

## 2022-12-13 PROCEDURE — 3288F FALL RISK ASSESSMENT DOCD: CPT | Mod: CPTII,S$GLB,, | Performed by: PODIATRIST

## 2022-12-13 PROCEDURE — 3044F PR MOST RECENT HEMOGLOBIN A1C LEVEL <7.0%: ICD-10-PCS | Mod: CPTII,S$GLB,, | Performed by: PODIATRIST

## 2022-12-13 PROCEDURE — 3066F NEPHROPATHY DOC TX: CPT | Mod: CPTII,S$GLB,, | Performed by: PODIATRIST

## 2022-12-13 PROCEDURE — 1100F PTFALLS ASSESS-DOCD GE2>/YR: CPT | Mod: CPTII,S$GLB,, | Performed by: PODIATRIST

## 2022-12-14 NOTE — PROGRESS NOTES
Subjective:      Patient ID: Lily Green is a 72 y.o. female.    Chief Complaint: Nail Care (4/27/2022 Samantha )      Lily Kline is a 72 y.o. female who presents to the clinic for evaluation and treatment of diabetic feet. Lily Kline has a past medical history of Anxiety, Celiac disease (05/2018), Celiac disease, Depression, Diabetes mellitus, Family history of breast cancer in mother, Hyperlipidemia, Hypertension, Meniere disease, Menopause, Peptic ulcer, Reflux esophagitis, Urinary tract infection, Vaginal infection, and Vaginal Pap smear (04/07/2014). Relates having toenails toenails that are in need of trimming.  Denies pain from the nails with today's exam. Has not attempted to self treat.  Also, notes the usual amount of callus build up. Lastly, relates having continued paresthesias in bilateral lower extremity.  States her prior Pain Management physician was contemplating a spinal cord stimulator prior to leaving [facility].  Now in the process of finding another provider.  Denies any additional pedal complaints.    PCP: Pavel Calixto MD    Date Last Seen by PCP: 11/22  Hemoglobin A1C   Date Value Ref Range Status   11/01/2022 5.8 (H) 4.0 - 5.6 % Final     Comment:     ADA Screening Guidelines:  5.7-6.4%  Consistent with prediabetes  >or=6.5%  Consistent with diabetes    High levels of fetal hemoglobin interfere with the HbA1C  assay. Heterozygous hemoglobin variants (HbS, HgC, etc)do  not significantly interfere with this assay.   However, presence of multiple variants may affect accuracy.     04/22/2022 6.0 (H) 4.0 - 5.6 % Final     Comment:     ADA Screening Guidelines:  5.7-6.4%  Consistent with prediabetes  >or=6.5%  Consistent with diabetes    High levels of fetal hemoglobin interfere with the HbA1C  assay. Heterozygous hemoglobin variants (HbS, HgC, etc)do  not significantly interfere with this assay.   However, presence of multiple variants may affect accuracy.     10/19/2021 6.2 (H) 4.0 -  5.6 % Final     Comment:     ADA Screening Guidelines:  5.7-6.4%  Consistent with prediabetes  >or=6.5%  Consistent with diabetes    High levels of fetal hemoglobin interfere with the HbA1C  assay. Heterozygous hemoglobin variants (HbS, HgC, etc)do  not significantly interfere with this assay.   However, presence of multiple variants may affect accuracy.     10/23/2019 6.8 % Final         Past Medical History:   Diagnosis Date    Anxiety     Celiac disease 2018    Celiac disease     Depression     Diabetes mellitus     Family history of breast cancer in mother      at age 68    Hyperlipidemia     Hypertension     Meniere disease     Menopause     Peptic ulcer     Reflux esophagitis     Urinary tract infection     Vaginal infection     Vaginal Pap smear 2014    Pap/Hpv Negative        Past Surgical History:   Procedure Laterality Date    APPENDECTOMY      BREAST SURGERY      Tags    CARPAL TUNNEL RELEASE Bilateral 2017    ,     COLONOSCOPY  2018    Normal  ([facility])     DILATION AND CURETTAGE OF UTERUS  1986    DUPUYTREN CONTRACTURE RELEASE Bilateral 2017    HYSTERECTOMY      BEAU w/ appy, no BSO     INJECTION OF NEUROLYTIC AGENT AROUND LUMBAR SYMPATHETIC NERVE N/A 2022    Procedure: BLOCK, LUMBAR SYMPATHETIC;  Surgeon: Souleymane Rizo Jr., MD;  Location: [facility];  Service: Pain Management;  Laterality: N/A;  no pacemaker   pt is diabetic     INJECTION OF NEUROLYTIC AGENT AROUND LUMBAR SYMPATHETIC NERVE N/A 2022    Procedure: BLOCK, LUMBAR SYMPATHETIC;  Surgeon: Souleymane Rizo Jr., MD;  Location: [facility];  Service: Pain Management;  Laterality: N/A;  diabetic     SHOULDER SURGERY   &     right rotator cuff    TONSILLECTOMY      UPPER GASTROINTESTINAL ENDOSCOPY  2018       Family History   Problem Relation Age of Onset    Vision loss Father     Arthritis Father     Breast cancer Mother     Cancer Mother     Vision loss Mother      Hyperlipidemia Sister     Breast cancer Paternal Aunt     Cancer Paternal Aunt     Diabetes Paternal Grandfather     Vision loss Sister     Kidney disease Neg Hx        Social History     Socioeconomic History    Marital status:    Tobacco Use    Smoking status: Never    Smokeless tobacco: Never   Substance and Sexual Activity    Alcohol use: No    Drug use: Never    Sexual activity: Not Currently     Partners: Male     Birth control/protection: See Surgical Hx     Comment: :      Social Determinants of Health     Financial Resource Strain: Low Risk     Difficulty of Paying Living Expenses: Not hard at all   Food Insecurity: No Food Insecurity    Worried About Running Out of Food in the Last Year: Never true    Ran Out of Food in the Last Year: Never true   Transportation Needs: No Transportation Needs    Lack of Transportation (Medical): No    Lack of Transportation (Non-Medical): No   Physical Activity: Sufficiently Active    Days of Exercise per Week: 7 days    Minutes of Exercise per Session: 50 min   Stress: Stress Concern Present    Feeling of Stress : Very much   Social Connections: Unknown    Frequency of Communication with Friends and Family: More than three times a week    Frequency of Social Gatherings with Friends and Family: More than three times a week    Active Member of Clubs or Organizations: Yes    Attends Club or Organization Meetings: More than 4 times per year    Marital Status:    Housing Stability: Low Risk     Unable to Pay for Housing in the Last Year: No    Number of Places Lived in the Last Year: 1    Unstable Housing in the Last Year: No       Current Outpatient Medications   Medication Sig Dispense Refill    alpha lipoic acid 600 mg Cap       atorvastatin (LIPITOR) 40 MG tablet TAKE 1 TABLET BY MOUTH ONCE DAILY 90 tablet 3    betahistine HCl (BETAHISTINE, BULK, MISC)       blood sugar diagnostic (TRUE METRIX GLUCOSE TEST STRIP) Strp 1 strip by Misc.(Non-Drug; Combo  Route) route 2 (two) times a day. 200 each 3    calcium carbonate/vitamin D3 (CALTRATE WITH VITAMIN D3 ORAL) Take by mouth.      cetirizine HCl (ZYRTEC ORAL)       cinnamon bark (CINNAMON ORAL) Take by mouth.      coenzyme Q10 100 mg capsule       cyanocobalamin 1,000 mcg/mL injection Inject 1 mL (1,000 mcg total) into the muscle every 30 days. 10 mL 3    dicyclomine (BENTYL) 10 MG capsule       DULoxetine (CYMBALTA) 30 MG capsule Take 1 capsule (30 mg total) by mouth 2 (two) times daily. 60 capsule 11    econazole nitrate 1 % cream Apply topically 2 (two) times daily. 30 g 2    finasteride (PROSCAR) 5 mg tablet       flaxseed oil 1,000 mg Cap once a day      gluc-condr-om3-dha-epa-fish-st 301-790-50-54 mg Cap once a day      glucosamine HCl 1,500 mg Tab Take 1,500 mg by mouth.      KRILL OIL ORAL Take by mouth.      lancets Misc 1 lancet by Misc.(Non-Drug; Combo Route) route once daily. 200 each 3    meclizine (ANTIVERT) 25 mg tablet Take 1 tablet (25 mg total) by mouth 3 (three) times daily as needed for Dizziness. 20 tablet 0    metFORMIN (GLUCOPHAGE) 500 MG tablet Take 1 tablet (500 mg total) by mouth 3 (three) times daily. 2 po qam, 1 po qhs. (Patient taking differently: Take 500 mg by mouth 2 (two) times daily. 1 po qam, 1 po qhs.) 270 tablet 3    MILK THISTLE ORAL Take by mouth.      pantoprazole (PROTONIX) 40 MG tablet TAKE 1 TABLET BY MOUTH ONCE DAILY  90 tablet 3    pregabalin (LYRICA) 75 MG capsule Take 1 capsule (75 mg total) by mouth 2 (two) times daily. 60 capsule 2    temazepam (RESTORIL) 30 mg capsule TAKE ONE CAPSULE BY MOUTH NIGHTLY AS NEEDED FOR INSOMNIA 30 capsule 2    triamcinolone acetonide 0.1% (KENALOG) 0.1 % ointment Apply topically 2 (two) times daily as needed (rash). 80 g 11    triamterene-hydrochlorothiazide 37.5-25 mg (MAXZIDE-25) 37.5-25 mg per tablet       vit C,P-Ik-htdtu-lutein-zeaxan (PRESERVISION AREDS 2) [phone] mg-unit-mg-mg Cap       vitamins  A,C,E-zinc-copper  [phone] unit-mg-unit Cap Take 1 tablet by mouth once daily.      blood-glucose meter kit Use to test twice a day 1 each 0     No current facility-administered medications for this visit.       Review of patient's allergies indicates:   Allergen Reactions    Crestor [rosuvastatin] Swelling    Gluten protein          Review of Systems   Constitutional: Negative for chills and fever.   Cardiovascular:  Negative for claudication and leg swelling.   Skin:  Positive for color change and nail changes. Negative for suspicious lesions.   Musculoskeletal:  Positive for joint pain. Negative for joint swelling, muscle cramps and muscle weakness.   Gastrointestinal:  Negative for nausea and vomiting.   Neurological:  Positive for numbness and paresthesias.   Psychiatric/Behavioral:  Negative for altered mental status.          Objective:      Physical Exam  Constitutional:       General: She is not in acute distress.     Appearance: She is well-developed. She is not diaphoretic.   Cardiovascular:      Pulses:           Dorsalis pedis pulses are 2+ on the right side and 2+ on the left side.        Posterior tibial pulses are 1+ on the right side and 1+ on the left side.      Comments: CFT is < 3 seconds bilateral.  Pedal hair growth is decreased bilateral.  Mild non pitting lower extremity edema noted bilateral.  Toes are cool to touch bilateral.  Musculoskeletal:         General: Deformity present. No tenderness.      Comments: Muscle strength 5/5 in all muscle groups bilateral.   (-) for pain with palpation of bilateral foot and ankle. Semi-reducible contracture of toes 2-5 bilateral with adductovarus rotation of the 5th.   Skin:     General: Skin is warm and dry.      Capillary Refill: Capillary refill takes less than 2 seconds.      Coloration: Skin is not pale.      Findings: Lesion present. No abrasion, bruising, burn, ecchymosis, erythema, petechiae or rash.      Comments: Pedal skin appears thin bilateral.   Toenails x 10 appear thickened by 2 mm, elongated by 4 mm, and discolored with subungual debris.  Hyperkeratotic lesion noted to bilateral sub 5th met head, Lt. Lateral 5th toe, and Rt. Medial heel.   Neurological:      Mental Status: She is alert and oriented to person, place, and time.      Sensory: Sensory deficit present.      Motor: No weakness or atrophy.      Comments: Protective sensation per [place] monofilament is decreased bilateral.  Vibratory sensation is decreased bilateral.  Light touch is intact bilateral.             Assessment:       Encounter Diagnoses   Name Primary?    Diabetic polyneuropathy associated with type 2 diabetes mellitus Yes    Hammer toes of both feet     Onychomycosis due to dermatophyte     Paresthesia of both feet     Corn or callus            Plan:       Lily Kline was seen today for nail care.    Diagnoses and all orders for this visit:    Diabetic polyneuropathy associated with type 2 diabetes mellitus    Hammer toes of both feet    Onychomycosis due to dermatophyte    Paresthesia of both feet    Corn or callus        I counseled the patient on her conditions, their implications and medical management.    To continue with daily application of CBD oil to the LEs.      Recommend 40% urea and 2% salicylic acid cream to better minimize callus build up.    Discussed referring to Dr. Ryland Bernard to further discuss spinal cord stimulation.      To continue wearing shoe gear that minimizes pressure to the digital deformities.    Shoe inspection. Diabetic Foot Education. Patient reminded of the importance of good nutrition and blood sugar control to help prevent podiatric complications of diabetes. Patient instructed on proper foot hygeine. We discussed wearing proper shoe gear, daily foot inspections, never walking without protective shoe gear, never putting sharp instruments to feet    With patient's permission, nails were aggressively reduced and debrided x 10 to their  soft tissue attachment mechanically and with electric , removing all offending nail and debris.  Also, a sterile # 15 scalpel was used to trim lesions x 4 down to smooth appearing skin without incident.  Patient relates relief following the procedure. She will continue to monitor the areas daily, inspect her feet, wear protective shoe gear when ambulatory, moisturizer to maintain skin integrity and follow in this office in approximately 3 months, sooner p.r.n.    Follow up in about 3 months (around 3/13/2023).    Lucas Bryan DPM

## 2023-01-05 ENCOUNTER — OFFICE VISIT (OUTPATIENT)
Dept: PAIN MEDICINE | Facility: CLINIC | Age: 73
End: 2023-01-05
Payer: MEDICARE

## 2023-01-05 ENCOUNTER — TELEPHONE (OUTPATIENT)
Dept: NEUROLOGY | Facility: CLINIC | Age: 73
End: 2023-01-05
Payer: MEDICARE

## 2023-01-05 VITALS
WEIGHT: 133.63 LBS | SYSTOLIC BLOOD PRESSURE: 131 MMHG | HEART RATE: 71 BPM | BODY MASS INDEX: 22.23 KG/M2 | DIASTOLIC BLOOD PRESSURE: 74 MMHG

## 2023-01-05 DIAGNOSIS — E11.40 PAINFUL DIABETIC NEUROPATHY: ICD-10-CM

## 2023-01-05 DIAGNOSIS — G89.4 CHRONIC PAIN SYNDROME: Primary | ICD-10-CM

## 2023-01-05 DIAGNOSIS — M79.2 NEURALGIA AND NEURITIS: ICD-10-CM

## 2023-01-05 PROCEDURE — 3008F PR BODY MASS INDEX (BMI) DOCUMENTED: ICD-10-PCS | Mod: CPTII,S$GLB,, | Performed by: ANESTHESIOLOGY

## 2023-01-05 PROCEDURE — 3078F DIAST BP <80 MM HG: CPT | Mod: CPTII,S$GLB,, | Performed by: ANESTHESIOLOGY

## 2023-01-05 PROCEDURE — 1159F PR MEDICATION LIST DOCUMENTED IN MEDICAL RECORD: ICD-10-PCS | Mod: CPTII,S$GLB,, | Performed by: ANESTHESIOLOGY

## 2023-01-05 PROCEDURE — 99214 OFFICE O/P EST MOD 30 MIN: CPT | Mod: GC,S$GLB,, | Performed by: ANESTHESIOLOGY

## 2023-01-05 PROCEDURE — 99214 PR OFFICE/OUTPT VISIT, EST, LEVL IV, 30-39 MIN: ICD-10-PCS | Mod: GC,S$GLB,, | Performed by: ANESTHESIOLOGY

## 2023-01-05 PROCEDURE — 3066F NEPHROPATHY DOC TX: CPT | Mod: CPTII,S$GLB,, | Performed by: ANESTHESIOLOGY

## 2023-01-05 PROCEDURE — 1160F RVW MEDS BY RX/DR IN RCRD: CPT | Mod: CPTII,S$GLB,, | Performed by: ANESTHESIOLOGY

## 2023-01-05 PROCEDURE — 3078F PR MOST RECENT DIASTOLIC BLOOD PRESSURE < 80 MM HG: ICD-10-PCS | Mod: CPTII,S$GLB,, | Performed by: ANESTHESIOLOGY

## 2023-01-05 PROCEDURE — 1160F PR REVIEW ALL MEDS BY PRESCRIBER/CLIN PHARMACIST DOCUMENTED: ICD-10-PCS | Mod: CPTII,S$GLB,, | Performed by: ANESTHESIOLOGY

## 2023-01-05 PROCEDURE — 1125F PR PAIN SEVERITY QUANTIFIED, PAIN PRESENT: ICD-10-PCS | Mod: CPTII,S$GLB,, | Performed by: ANESTHESIOLOGY

## 2023-01-05 PROCEDURE — 99999 PR PBB SHADOW E&M-EST. PATIENT-LVL III: CPT | Mod: PBBFAC,,, | Performed by: ANESTHESIOLOGY

## 2023-01-05 PROCEDURE — 1159F MED LIST DOCD IN RCRD: CPT | Mod: CPTII,S$GLB,, | Performed by: ANESTHESIOLOGY

## 2023-01-05 PROCEDURE — 3075F SYST BP GE 130 - 139MM HG: CPT | Mod: CPTII,S$GLB,, | Performed by: ANESTHESIOLOGY

## 2023-01-05 PROCEDURE — 3075F PR MOST RECENT SYSTOLIC BLOOD PRESS GE 130-139MM HG: ICD-10-PCS | Mod: CPTII,S$GLB,, | Performed by: ANESTHESIOLOGY

## 2023-01-05 PROCEDURE — 3066F PR DOCUMENTATION OF TREATMENT FOR NEPHROPATHY: ICD-10-PCS | Mod: CPTII,S$GLB,, | Performed by: ANESTHESIOLOGY

## 2023-01-05 PROCEDURE — 99999 PR PBB SHADOW E&M-EST. PATIENT-LVL III: ICD-10-PCS | Mod: PBBFAC,,, | Performed by: ANESTHESIOLOGY

## 2023-01-05 PROCEDURE — 1125F AMNT PAIN NOTED PAIN PRSNT: CPT | Mod: CPTII,S$GLB,, | Performed by: ANESTHESIOLOGY

## 2023-01-05 PROCEDURE — 3008F BODY MASS INDEX DOCD: CPT | Mod: CPTII,S$GLB,, | Performed by: ANESTHESIOLOGY

## 2023-01-05 RX ORDER — PREGABALIN 150 MG/1
150 CAPSULE ORAL 2 TIMES DAILY
Qty: 60 CAPSULE | Refills: 0 | Status: ON HOLD | OUTPATIENT
Start: 2023-01-05 | End: 2023-01-31

## 2023-01-05 NOTE — TELEPHONE ENCOUNTER
----- Message from Ela Sandoval MA sent at 1/5/2023  3:25 PM CST -----  Regarding: EMG  Good Afternoon,    Can you please contact this patient to be scheduled for an EMG?    Thank you!

## 2023-01-05 NOTE — PROGRESS NOTES
Chronic patient Established Note (Follow up visit)      SUBJECTIVE:    Interval Updates:  1/5/2023  Patient returns to clinic to discuss Spinal Cord Stimulator for burning, tingling, and aching in her feet.  She previously had a lumbar sympathetic block that provided her with 2 days of relief and then the symptoms returned.  She wishes to proceed with the SCS.  She has not been evaluated by Psychology for SCS placement.     Interval Updates:  9/8/2022 -  Norma returns to clinic s/p Lumbar sympathetic block on 8/25 with 100% relief for one day.  She reports burning, tingling and Aching of the both feet (location) pain rated 3/10 today with a weekly range of 3-4/10.       Interval Updates:   7/6/2022   Norma returns to clinic s/p TPI on 6/1/22 with 80% relief.  She reports a pain intensity 2/10 today with a weekly range of 2-3/10.        Interval Updates:   06/01/2022 - Ms. Green is following up for bilateral foot pain and mediation refill..  She requests a refill of Lyrica (pregabalin) which are working well to control Her pain.  She reports ?% relief with the current medication regimen.  Medication side effects: none.  Pain is currently rate 3/10 with a weekly range 3-4/10.  It is described as Aching, Tingling and Numb.   Pain is worsened by: daily activity and improved by: massage.  She notes 70-80% relief of bilateral foot pain since the bilateral lumbar sympathetic block 1/6/22 lasting up until May 2022.  During that time she reports using none of the topical medications she was previously using, but has now restarted.     12/16/21 - Ms. Green is following up for bilateral foot pain. Pain is currently rate 2/10 with a weekly range 2-4/10.  Patient continues to complain of bilateral foot pain she was previously started on Lyrica 25 mg t.i.d. which she continues to take she reports no adverse side effects however she reports no significant relief with this medication.  Subjective:   Lily Raisaeusebia Green is  a 72 y.o. female who has a past medical history of Anxiety, Celiac disease, Celiac disease, Depression, Diabetes mellitus, Family history of breast cancer in mother, Hyperlipidemia, Hypertension, Meniere disease, Menopause, Peptic ulcer, Reflux esophagitis, Urinary tract infection, Vaginal infection, and Vaginal Pap smear. She complains of pain as described below.     Location: bilateral feet   Onset: pain presents for several years but pain has been moderate to severe for about a year; especially bad since Oct 2021  Radiation: None  Timing: persistent  Current Pain Score: 2/10  Weekly Pain Range: 2-3/10  Quality: Burning, Tingling and Numb  Worsened by: walking for more than 10 minutes  Improved by: medications  Limitations: walking, crossing legs     Previous Therapies:  PT/OT:   HEP:   TENS:   Interventions:   -8/25/2022- Lumbar Sympathetic Block, bilateral 100% relief one day   -6/1/2022 Right periscapular TPI 80%   1/6/22 - Bilat LSB with 70-80% relief for 5 months  Surgery: None relevant  Opioids: No  Adjuvants: Yes, herbal substances; Gabapentin gave no relief at 600 mg TID  Denies: duloxetine     Current Pain Medications:  OTC supplements to address nerve pain  CBD oil - topical   [employer] topical oral works  Duloxetine 30 mg daily  Pregabalin 50 mg BID    Pain Disability Index Review:  Last 3 PDI Scores 1/5/2023 9/8/2022 7/6/2022   Pain Disability Index (PDI) 28 40 24       Pain Medications:  Lyrica    Opioid Contract: not applicable     report:  Not applicable    Pain Procedures:   Lumbar Sympathetic Block    Physical Therapy/Home Exercise: no    Imaging: N/A    Allergies:   Review of patient's allergies indicates:   Allergen Reactions    Crestor [rosuvastatin] Swelling    Gluten protein        Current Medications:   Current Outpatient Medications   Medication Sig Dispense Refill    alpha lipoic acid 600 mg Cap       atorvastatin (LIPITOR) 40 MG tablet TAKE 1 TABLET BY MOUTH ONCE DAILY 90  tablet 3    betahistine HCl (BETAHISTINE, BULK, MISC)       blood sugar diagnostic (TRUE METRIX GLUCOSE TEST STRIP) Strp 1 strip by Misc.(Non-Drug; Combo Route) route 2 (two) times a day. 200 each 3    calcium carbonate/vitamin D3 (CALTRATE WITH VITAMIN D3 ORAL) Take by mouth.      cetirizine HCl (ZYRTEC ORAL)       cinnamon bark (CINNAMON ORAL) Take by mouth.      coenzyme Q10 100 mg capsule       cyanocobalamin 1,000 mcg/mL injection Inject 1 mL (1,000 mcg total) into the muscle every 30 days. 10 mL 3    dicyclomine (BENTYL) 10 MG capsule       DULoxetine (CYMBALTA) 30 MG capsule Take 1 capsule (30 mg total) by mouth 2 (two) times daily. 60 capsule 11    econazole nitrate 1 % cream Apply topically 2 (two) times daily. 30 g 2    flaxseed oil 1,000 mg Cap once a day      gluc-condr-om3-dha-epa-fish-st 678-940-86-54 mg Cap once a day      glucosamine HCl 1,500 mg Tab Take 1,500 mg by mouth.      KRILL OIL ORAL Take by mouth.      lancets Misc 1 lancet by Misc.(Non-Drug; Combo Route) route once daily. 200 each 3    meclizine (ANTIVERT) 25 mg tablet Take 1 tablet (25 mg total) by mouth 3 (three) times daily as needed for Dizziness. 20 tablet 0    metFORMIN (GLUCOPHAGE) 500 MG tablet Take 1 tablet (500 mg total) by mouth 3 (three) times daily. 2 po qam, 1 po qhs. (Patient taking differently: Take 500 mg by mouth 2 (two) times daily. 1 po qam, 1 po qhs.) 270 tablet 3    MILK THISTLE ORAL Take by mouth.      pantoprazole (PROTONIX) 40 MG tablet TAKE 1 TABLET BY MOUTH ONCE DAILY  90 tablet 3    pregabalin (LYRICA) 75 MG capsule Take 1 capsule (75 mg total) by mouth 2 (two) times daily. 60 capsule 2    temazepam (RESTORIL) 30 mg capsule TAKE ONE CAPSULE BY MOUTH NIGHTLY AS NEEDED FOR INSOMNIA 30 capsule 2    triamcinolone acetonide 0.1% (KENALOG) 0.1 % ointment Apply topically 2 (two) times daily as needed (rash). 80 g 11    triamterene-hydrochlorothiazide 37.5-25 mg (MAXZIDE-25) 37.5-25 mg per tablet       vit  C,B-Jw-gwknf-lutein-zeaxan (PRESERVISION AREDS 2) 069-912-64-1 mg-unit-mg-mg Cap       vitamins  A,C,E-zinc-copper 14,[phone] unit-mg-unit Cap Take 1 tablet by mouth once daily.      blood-glucose meter kit Use to test twice a day 1 each 0    finasteride (PROSCAR) 5 mg tablet        No current facility-administered medications for this visit.       REVIEW OF SYSTEMS:    GENERAL:  No weight loss, malaise or fevers.  HEENT:  Negative for frequent or significant headaches.  NECK:  Negative for lumps, goiter, pain and significant neck swelling.  RESPIRATORY:  Negative for cough, wheezing or shortness of breath.  CARDIOVASCULAR:  Negative for chest pain, leg swelling or palpitations.  GI:  Negative for abdominal discomfort, blood in stools or black stools or change in bowel habits.  MUSCULOSKELETAL:  See HPI.  SKIN:  Negative for lesions, rash, and itching.  PSYCH:  Negative for sleep disturbance, mood disorder and recent psychosocial stressors.  HEMATOLOGY/LYMPHOLOGY:  Negative for prolonged bleeding, bruising easily or swollen nodes.  NEURO:   No history of headaches, syncope, paralysis, seizures or tremors.  All other reviewed and negative other than HPI.    Past Medical History:  Past Medical History:   Diagnosis Date    Anxiety     Celiac disease 2018    Celiac disease     Depression     Diabetes mellitus     Family history of breast cancer in mother      at age 68    Hyperlipidemia     Hypertension     Meniere disease     Menopause     Peptic ulcer     Reflux esophagitis     Urinary tract infection     Vaginal infection     Vaginal Pap smear 2014    Pap/Hpv Negative        Past Surgical History:  Past Surgical History:   Procedure Laterality Date    APPENDECTOMY      BREAST SURGERY      Tags    CARPAL TUNNEL RELEASE Bilateral 2017    ,     COLONOSCOPY  2018    Normal  ([facility])     DILATION AND CURETTAGE OF UTERUS  1986    DUPUYTREN CONTRACTURE RELEASE Bilateral 2017     HYSTERECTOMY  1987    BEAU w/ appy, no BSO     INJECTION OF NEUROLYTIC AGENT AROUND LUMBAR SYMPATHETIC NERVE N/A 1/6/2022    Procedure: BLOCK, LUMBAR SYMPATHETIC;  Surgeon: Souleymane Rizo Jr., MD;  Location: [facility] PAIN T;  Service: Pain Management;  Laterality: N/A;  no pacemaker   pt is diabetic     INJECTION OF NEUROLYTIC AGENT AROUND LUMBAR SYMPATHETIC NERVE N/A 8/25/2022    Procedure: BLOCK, LUMBAR SYMPATHETIC;  Surgeon: Souleymane Rizo Jr., MD;  Location: [facility] PAIN MGT;  Service: Pain Management;  Laterality: N/A;  diabetic     SHOULDER SURGERY  2009 & 2010    right rotator cuff    TONSILLECTOMY      UPPER GASTROINTESTINAL ENDOSCOPY  04/2018       Family History:  Family History   Problem Relation Age of Onset    Vision loss Father     Arthritis Father     Breast cancer Mother     Cancer Mother     Vision loss Mother     Hyperlipidemia Sister     Breast cancer Paternal Aunt     Cancer Paternal Aunt     Diabetes Paternal Grandfather     Vision loss Sister     Kidney disease Neg Hx        Social History:  Social History     Socioeconomic History    Marital status:    Tobacco Use    Smoking status: Never    Smokeless tobacco: Never   Substance and Sexual Activity    Alcohol use: No    Drug use: Never    Sexual activity: Not Currently     Partners: Male     Birth control/protection: See Surgical Hx     Comment: :      Social Determinants of Health     Financial Resource Strain: Low Risk     Difficulty of Paying Living Expenses: Not hard at all   Food Insecurity: No Food Insecurity    Worried About Running Out of Food in the Last Year: Never true    Ran Out of Food in the Last Year: Never true   Transportation Needs: No Transportation Needs    Lack of Transportation (Medical): No    Lack of Transportation (Non-Medical): No   Physical Activity: Sufficiently Active    Days of Exercise per Week: 7 days    Minutes of Exercise per Session: 50 min   Stress: Stress Concern Present    Feeling of Stress :  Very much   Social Connections: Unknown    Frequency of Communication with Friends and Family: More than three times a week    Frequency of Social Gatherings with Friends and Family: More than three times a week    Active Member of Clubs or Organizations: Yes    Attends Club or Organization Meetings: More than 4 times per year    Marital Status:    Housing Stability: Low Risk     Unable to Pay for Housing in the Last Year: No    Number of Places Lived in the Last Year: 1    Unstable Housing in the Last Year: No       OBJECTIVE:    /74   Pulse 71   Wt 60.6 kg (133 lb 9.6 oz)   LMP  (LMP Unknown) Comment: BEAU/ NO BSO  1986  BMI 22.23 kg/m²     PHYSICAL EXAMINATION:    General appearance: Well appearing, in no acute distress, alert and oriented x3.  Psych:  Mood and affect appropriate.  Skin: Skin color, texture, turgor normal, no rashes or lesions, in both upper and lower body.  Head/face:  Atraumatic, normocephalic. No palpable lymph nodes  Neck: No pain to palpation over the cervical paraspinous muscles. Spurling Negative. No pain with neck flexion, extension, or lateral flexion. .  Cor: RRR  Pulm: CTA  GI: Abdomen soft and non-tender.  Back: Straight leg raising in the sitting and supine positions is negative to radicular pain. No pain to palpation over the spine or costovertebral angles. Normal range of motion without pain reproduction.  Extremities: Peripheral joint ROM is full and pain free without obvious instability or laxity in all four extremities. No deformities, edema, or skin discoloration. Good capillary refill.  Musculoskeletal: Shoulder, hip, sacroiliac and knee provocative maneuvers are negative. Bilateral upper and lower extremity strength is normal and symmetric.  No atrophy or tone abnormalities are noted.  Neuro: Bilateral upper and lower extremity coordination and muscle stretch reflexes are physiologic and symmetric.  Plantar response are downgoing. No loss of sensation is  noted.  Gait: Normal.    ASSESSMENT: 72 y.o. year old female with bilateral foot pain, consistent with the following     1. Chronic pain syndrome  Ambulatory referral/consult to Neurology    EMG W/ ULTRASOUND AND NERVE CONDUCTION TEST 2 Extremities      2. Neuralgia and neuritis  Ambulatory referral/consult to Neurology    EMG W/ ULTRASOUND AND NERVE CONDUCTION TEST 2 Extremities      3. Painful diabetic neuropathy  Ambulatory referral/consult to Neurology    EMG W/ ULTRASOUND AND NERVE CONDUCTION TEST 2 Extremities            PLAN:   - I have stressed the importance of physical activity and a home exercise plan to help with pain and improve health.  - Patient can continue with medications for now since they are providing benefits, using them appropriately, and without side effects.  - Increase Lyrica to 150mg BID  - Obtain EMG to further evaluate  - Refer to Neurology for further evaluation of her neuropathy  - RTC after Neurology and EMG referrals  - Counseled patient regarding the importance of activity modification, constant sleeping habits, and physical therapy.    The above plan and management options were discussed at length with patient. Patient is in agreement with the above and verbalized understanding.    Silver Quinn   I have personally reviewed the history and exam of this patient and agree with the resident/fellow/NPs note as stated above.    Edgard Dozier MD    01/05/2023

## 2023-01-10 NOTE — PROGRESS NOTES
Just wanted to let you know that I got your mammogram and ultrasound results back and the radiologist reading is perfectly stable. They recommend for you to repeat your MMG and u/s in a year!  Let me know if you have any questions or concerns  Hope you have a great day!  Dr Serrano

## 2023-01-11 ENCOUNTER — PROCEDURE VISIT (OUTPATIENT)
Dept: NEUROLOGY | Facility: CLINIC | Age: 73
End: 2023-01-11
Payer: MEDICARE

## 2023-01-11 DIAGNOSIS — E11.40 PAINFUL DIABETIC NEUROPATHY: ICD-10-CM

## 2023-01-11 DIAGNOSIS — M79.2 NEURALGIA AND NEURITIS: ICD-10-CM

## 2023-01-11 DIAGNOSIS — G89.4 CHRONIC PAIN SYNDROME: ICD-10-CM

## 2023-01-11 PROCEDURE — 95910 NRV CNDJ TEST 7-8 STUDIES: CPT | Mod: S$GLB,,, | Performed by: PHYSICAL MEDICINE & REHABILITATION

## 2023-01-11 PROCEDURE — 95886 PR EMG COMPLETE, W/ NERVE CONDUCTION STUDIES, 5+ MUSCLES: ICD-10-PCS | Mod: S$GLB,,, | Performed by: PHYSICAL MEDICINE & REHABILITATION

## 2023-01-11 PROCEDURE — 95910 PR NERVE CONDUCTION STUDY; 7-8 STUDIES: ICD-10-PCS | Mod: S$GLB,,, | Performed by: PHYSICAL MEDICINE & REHABILITATION

## 2023-01-11 PROCEDURE — 95886 MUSC TEST DONE W/N TEST COMP: CPT | Mod: S$GLB,,, | Performed by: PHYSICAL MEDICINE & REHABILITATION

## 2023-01-11 NOTE — PROCEDURES
Test Date:  2023    Patient: Lily Green : 1950 Physician: Noah Enamorado D.O.   ID#: [id number] SEX: Male Ref. Phys: Silver Quinn DO     HPI: Lily Green is a 72 y.o.female who presents for NCS/EMG to evaluate for diabetic neuropathy.  Patient with symptoms of foot and distal leg burning pain, aggravated by heat, and alleviated with cryotherapies.      NCV & EMG Findings:  All nerve conduction studies (as indicated in the following tables) were within normal limits.  All examined muscles (as indicated in the following table) showed no evidence of electrical instability.    Impression:  This is a normal electrophysiologic study of the bilateral lower extremities.  No evidence of a large fiber neuropathy.  Note that her symptoms could be consistent with a small fiber neuropathy, which NCS/EMG will not properly evaluate for.         ___________________________  Noah Enamorado D.O.        NCS+  Motor Nerve Results      Latency Amplitude F-Lat Segment Distance CV Comment   Site (ms) Norm (mV) Norm (ms)  (cm) (m/s) Norm    Left Fibular (EDB)   Ankle 3.3  < 6.5 1.79  > 1.10         Bel Fib Head 10.8 - 1.67 -  Bel Fib Head-Ankle 35 47  > 39    Pop Fossa 12.1 - 1.80 -  Pop Fossa-Bel Fib Head 7 54  > 42    Right Fibular (EDB)   Ankle 2.9  < 6.5 3.6  > 1.10         Bel Fib Head 10.1 - 3.4 -  Bel Fib Head-Ankle 33 46  > 39    Pop Fossa 12.5 - 4.4 -  Pop Fossa-Bel Fib Head 14 58  > 42    Left Tibial (AHB)   Ankle 4.2  < 6.1 5.7  > 1.10         Right Tibial (AHB)   Ankle 4.3  < 6.1 5.6  > 1.10           Sensory Nerve Results      Latency (Peak) Amplitude (P-P) Segment Distance CV Comment   Site (ms) Norm (µV) Norm  (cm) (m/s) Norm    Left Sural   Calf-Lat Mall 2.3  < 4.5 14  > 4 Calf-Lat Mall 14 61  > 35    Right Sural   Calf-Lat Mall 3.0  < 4.5 12  > 4 Calf-Lat Mall 14 47  > 35    Left Superficial Fibular   14 cm-Ankle 3.1  < 4.2 9  > 5 14 cm-Ankle 14 45  > 32    Right Superficial  Fibular   14 cm-Ankle 2.7  < 4.2 9  > 5 14 cm-Ankle 14 52  > 32      EMG+     Side Muscle Nerve Root Ins Act Fibs Psw Amp Dur Poly Recrt Int Pat Comment   Left Vastus Med Femoral L2-L4 Nml Nml Nml Nml Nml 0 Nml Nml    Left Tib Anterior Fibular,  Deep Fibula... L4-L5 Nml Nml Nml Nml Nml 0 Nml Nml    Left Fib Longus Fibular,  Superficial... L5-S1 Nml Nml Nml Nml Nml 0 Nml Nml    Left Gastroc Tibial S1-S2 Nml Nml Nml Nml Nml 0 Nml Nml    Left AHB Tibial,  Medial Plant... S1-S2 Nml Nml Nml Nml Nml 0 Nml Nml    Left Dorsal Interossei ped I Lateral Plantar S2-S3 Nml Nml Nml Nml Nml 0 Nml Nml    Right Vastus Med Femoral L2-L4 Nml Nml Nml Nml Nml 0 Nml Nml    Right Tib Anterior Fibular,  Deep Fibula... L4-L5 Nml Nml Nml Nml Nml 0 Nml Nml    Right Fib Longus Fibular,  Superficial... L5-S1 Nml Nml Nml Nml Nml 0 Nml Nml    Right Gastroc Tibial S1-S2 Nml Nml Nml Nml Nml 0 Nml Nml    Right AHB Tibial,  Medial Plant... S1-S2 Nml Nml Nml Nml Nml 0 Nml Nml    Right Dorsal Interossei ped I Lateral Plantar S2-S3 Nml Nml Nml Nml Nml 0 Nml Nml            Waveforms:    Motor              Sensory

## 2023-01-13 ENCOUNTER — HOSPITAL ENCOUNTER (INPATIENT)
Facility: HOSPITAL | Age: 73
LOS: 13 days | Discharge: NURSING, REHAB OR LONG-TERM CARE | DRG: 853 | End: 2023-01-26
Attending: STUDENT IN AN ORGANIZED HEALTH CARE EDUCATION/TRAINING PROGRAM | Admitting: STUDENT IN AN ORGANIZED HEALTH CARE EDUCATION/TRAINING PROGRAM
Payer: MEDICARE

## 2023-01-13 DIAGNOSIS — R79.89 ELEVATED LACTIC ACID LEVEL: ICD-10-CM

## 2023-01-13 DIAGNOSIS — R57.9 SHOCK: ICD-10-CM

## 2023-01-13 DIAGNOSIS — K63.9 SUBMUCOSAL COLONIC LESION: ICD-10-CM

## 2023-01-13 DIAGNOSIS — N17.9 AKI (ACUTE KIDNEY INJURY): ICD-10-CM

## 2023-01-13 DIAGNOSIS — E11.42 TYPE 2 DIABETES MELLITUS WITH DIABETIC POLYNEUROPATHY, WITHOUT LONG-TERM CURRENT USE OF INSULIN: ICD-10-CM

## 2023-01-13 DIAGNOSIS — K55.9 LARGE BOWEL ISCHEMIA: ICD-10-CM

## 2023-01-13 DIAGNOSIS — J96.01 ACUTE HYPOXEMIC RESPIRATORY FAILURE: ICD-10-CM

## 2023-01-13 DIAGNOSIS — N17.0 ATN (ACUTE TUBULAR NECROSIS): ICD-10-CM

## 2023-01-13 DIAGNOSIS — K65.9 PERITONITIS: Primary | ICD-10-CM

## 2023-01-13 PROCEDURE — 51798 US URINE CAPACITY MEASURE: CPT

## 2023-01-13 PROCEDURE — 84100 ASSAY OF PHOSPHORUS: CPT | Performed by: STUDENT IN AN ORGANIZED HEALTH CARE EDUCATION/TRAINING PROGRAM

## 2023-01-13 PROCEDURE — 80053 COMPREHEN METABOLIC PANEL: CPT | Mod: 91 | Performed by: STUDENT IN AN ORGANIZED HEALTH CARE EDUCATION/TRAINING PROGRAM

## 2023-01-13 PROCEDURE — 83735 ASSAY OF MAGNESIUM: CPT | Performed by: STUDENT IN AN ORGANIZED HEALTH CARE EDUCATION/TRAINING PROGRAM

## 2023-01-13 PROCEDURE — 20000000 HC ICU ROOM

## 2023-01-13 PROCEDURE — 83690 ASSAY OF LIPASE: CPT | Performed by: STUDENT IN AN ORGANIZED HEALTH CARE EDUCATION/TRAINING PROGRAM

## 2023-01-13 PROCEDURE — 84443 ASSAY THYROID STIM HORMONE: CPT | Performed by: STUDENT IN AN ORGANIZED HEALTH CARE EDUCATION/TRAINING PROGRAM

## 2023-01-13 PROCEDURE — 83605 ASSAY OF LACTIC ACID: CPT | Mod: 91 | Performed by: STUDENT IN AN ORGANIZED HEALTH CARE EDUCATION/TRAINING PROGRAM

## 2023-01-13 PROCEDURE — 82150 ASSAY OF AMYLASE: CPT | Performed by: STUDENT IN AN ORGANIZED HEALTH CARE EDUCATION/TRAINING PROGRAM

## 2023-01-13 RX ORDER — GLUCAGON 1 MG
1 KIT INJECTION
Status: DISCONTINUED | OUTPATIENT
Start: 2023-01-14 | End: 2023-01-20

## 2023-01-13 RX ORDER — MUPIROCIN 20 MG/G
OINTMENT TOPICAL 2 TIMES DAILY
Status: COMPLETED | OUTPATIENT
Start: 2023-01-14 | End: 2023-01-18

## 2023-01-13 RX ORDER — IBUPROFEN 200 MG
24 TABLET ORAL
Status: DISCONTINUED | OUTPATIENT
Start: 2023-01-14 | End: 2023-01-13

## 2023-01-13 RX ORDER — FLUDROCORTISONE ACETATE 0.1 MG/1
100 TABLET ORAL DAILY
Status: DISCONTINUED | OUTPATIENT
Start: 2023-01-14 | End: 2023-01-15

## 2023-01-13 RX ORDER — GLUCAGON 1 MG
1 KIT INJECTION
Status: DISCONTINUED | OUTPATIENT
Start: 2023-01-14 | End: 2023-01-13

## 2023-01-13 RX ORDER — ENOXAPARIN SODIUM 100 MG/ML
40 INJECTION SUBCUTANEOUS EVERY 24 HOURS
Status: DISCONTINUED | OUTPATIENT
Start: 2023-01-14 | End: 2023-01-14

## 2023-01-13 RX ORDER — SODIUM CHLORIDE 0.9 % (FLUSH) 0.9 %
5 SYRINGE (ML) INJECTION
Status: DISCONTINUED | OUTPATIENT
Start: 2023-01-14 | End: 2023-01-26 | Stop reason: HOSPADM

## 2023-01-13 RX ORDER — ACETAMINOPHEN 325 MG/1
650 TABLET ORAL EVERY 4 HOURS PRN
Status: DISCONTINUED | OUTPATIENT
Start: 2023-01-14 | End: 2023-01-14

## 2023-01-13 RX ORDER — INSULIN ASPART 100 [IU]/ML
0-5 INJECTION, SOLUTION INTRAVENOUS; SUBCUTANEOUS
Status: DISCONTINUED | OUTPATIENT
Start: 2023-01-14 | End: 2023-01-13

## 2023-01-13 RX ORDER — TALC
9 POWDER (GRAM) TOPICAL NIGHTLY PRN
Status: DISCONTINUED | OUTPATIENT
Start: 2023-01-14 | End: 2023-01-15

## 2023-01-13 RX ORDER — INSULIN ASPART 100 [IU]/ML
1-10 INJECTION, SOLUTION INTRAVENOUS; SUBCUTANEOUS
Status: DISCONTINUED | OUTPATIENT
Start: 2023-01-14 | End: 2023-01-13

## 2023-01-13 RX ORDER — IBUPROFEN 200 MG
16 TABLET ORAL
Status: DISCONTINUED | OUTPATIENT
Start: 2023-01-14 | End: 2023-01-13

## 2023-01-13 RX ORDER — LIDOCAINE 50 MG/G
1 PATCH TOPICAL DAILY PRN
Status: DISCONTINUED | OUTPATIENT
Start: 2023-01-14 | End: 2023-01-26 | Stop reason: HOSPADM

## 2023-01-13 RX ORDER — INSULIN ASPART 100 [IU]/ML
0-5 INJECTION, SOLUTION INTRAVENOUS; SUBCUTANEOUS EVERY 6 HOURS PRN
Status: DISCONTINUED | OUTPATIENT
Start: 2023-01-14 | End: 2023-01-20

## 2023-01-13 RX ADMIN — NOREPINEPHRINE BITARTRATE 0.48 MCG/KG/MIN: 1 INJECTION, SOLUTION, CONCENTRATE INTRAVENOUS at 11:01

## 2023-01-14 ENCOUNTER — ANESTHESIA EVENT (OUTPATIENT)
Dept: SURGERY | Facility: HOSPITAL | Age: 73
DRG: 853 | End: 2023-01-14
Payer: MEDICARE

## 2023-01-14 ENCOUNTER — ANESTHESIA (OUTPATIENT)
Dept: SURGERY | Facility: HOSPITAL | Age: 73
DRG: 853 | End: 2023-01-14
Payer: MEDICARE

## 2023-01-14 PROBLEM — K65.9 PERITONITIS: Status: ACTIVE | Noted: 2023-01-14

## 2023-01-14 LAB
ABO + RH BLD: NORMAL
ALBUMIN SERPL BCP-MCNC: 2.4 G/DL (ref 3.5–5.2)
ALBUMIN SERPL BCP-MCNC: 2.4 G/DL (ref 3.5–5.2)
ALBUMIN SERPL BCP-MCNC: 2.6 G/DL (ref 3.5–5.2)
ALBUMIN SERPL BCP-MCNC: 2.7 G/DL (ref 3.5–5.2)
ALLENS TEST: ABNORMAL
ALLENS TEST: ABNORMAL
ALP SERPL-CCNC: 138 U/L (ref 55–135)
ALP SERPL-CCNC: 157 U/L (ref 55–135)
ALP SERPL-CCNC: 245 U/L (ref 55–135)
ALP SERPL-CCNC: 264 U/L (ref 55–135)
ALT SERPL W/O P-5'-P-CCNC: 83 U/L (ref 10–44)
ALT SERPL W/O P-5'-P-CCNC: 92 U/L (ref 10–44)
ALT SERPL W/O P-5'-P-CCNC: 94 U/L (ref 10–44)
ALT SERPL W/O P-5'-P-CCNC: 96 U/L (ref 10–44)
AMYLASE SERPL-CCNC: 1884 U/L (ref 20–110)
ANION GAP SERPL CALC-SCNC: 11 MMOL/L (ref 8–16)
ANION GAP SERPL CALC-SCNC: 11 MMOL/L (ref 8–16)
ANION GAP SERPL CALC-SCNC: 14 MMOL/L (ref 8–16)
ANION GAP SERPL CALC-SCNC: 17 MMOL/L (ref 8–16)
ANION GAP SERPL CALC-SCNC: 18 MMOL/L (ref 8–16)
ANION GAP SERPL CALC-SCNC: 21 MMOL/L (ref 8–16)
AST SERPL-CCNC: 243 U/L (ref 10–40)
AST SERPL-CCNC: 294 U/L (ref 10–40)
AST SERPL-CCNC: 369 U/L (ref 10–40)
AST SERPL-CCNC: 389 U/L (ref 10–40)
BASOPHILS NFR BLD: 0 % (ref 0–1.9)
BASOPHILS NFR BLD: 0 % (ref 0–1.9)
BILIRUB SERPL-MCNC: 0.6 MG/DL (ref 0.1–1)
BILIRUB SERPL-MCNC: 0.7 MG/DL (ref 0.1–1)
BLD GP AB SCN CELLS X3 SERPL QL: NORMAL
BLD PROD TYP BPU: NORMAL
BLOOD UNIT EXPIRATION DATE: NORMAL
BLOOD UNIT TYPE CODE: 6200
BLOOD UNIT TYPE: NORMAL
BUN SERPL-MCNC: 31 MG/DL (ref 8–23)
BUN SERPL-MCNC: 58 MG/DL (ref 8–23)
BUN SERPL-MCNC: 58 MG/DL (ref 8–23)
BUN SERPL-MCNC: 60 MG/DL (ref 8–23)
BUN SERPL-MCNC: 60 MG/DL (ref 8–23)
BUN SERPL-MCNC: 62 MG/DL (ref 8–23)
BURR CELLS BLD QL SMEAR: ABNORMAL
BURR CELLS BLD QL SMEAR: ABNORMAL
CALCIUM SERPL-MCNC: 6.8 MG/DL (ref 8.7–10.5)
CALCIUM SERPL-MCNC: 6.9 MG/DL (ref 8.7–10.5)
CALCIUM SERPL-MCNC: 7.1 MG/DL (ref 8.7–10.5)
CALCIUM SERPL-MCNC: 7.5 MG/DL (ref 8.7–10.5)
CALCIUM SERPL-MCNC: 7.7 MG/DL (ref 8.7–10.5)
CALCIUM SERPL-MCNC: 7.9 MG/DL (ref 8.7–10.5)
CHLORIDE SERPL-SCNC: 100 MMOL/L (ref 95–110)
CHLORIDE SERPL-SCNC: 101 MMOL/L (ref 95–110)
CHLORIDE SERPL-SCNC: 102 MMOL/L (ref 95–110)
CHLORIDE SERPL-SCNC: 102 MMOL/L (ref 95–110)
CHLORIDE SERPL-SCNC: 98 MMOL/L (ref 95–110)
CHLORIDE SERPL-SCNC: 99 MMOL/L (ref 95–110)
CK SERPL-CCNC: 111 U/L (ref 20–180)
CO2 SERPL-SCNC: 10 MMOL/L (ref 23–29)
CO2 SERPL-SCNC: 12 MMOL/L (ref 23–29)
CO2 SERPL-SCNC: 12 MMOL/L (ref 23–29)
CO2 SERPL-SCNC: 13 MMOL/L (ref 23–29)
CO2 SERPL-SCNC: 14 MMOL/L (ref 23–29)
CO2 SERPL-SCNC: 21 MMOL/L (ref 23–29)
CODING SYSTEM: NORMAL
CREAT SERPL-MCNC: 1.6 MG/DL (ref 0.5–1.4)
CREAT SERPL-MCNC: 1.7 MG/DL (ref 0.5–1.4)
CREAT SERPL-MCNC: 1.9 MG/DL (ref 0.5–1.4)
CREAT SERPL-MCNC: 1.9 MG/DL (ref 0.5–1.4)
CREAT SERPL-MCNC: 2.2 MG/DL (ref 0.5–1.4)
CREAT SERPL-MCNC: 2.4 MG/DL (ref 0.5–1.4)
DACRYOCYTES BLD QL SMEAR: ABNORMAL
DELSYS: ABNORMAL
DELSYS: ABNORMAL
DIFFERENTIAL METHOD: ABNORMAL
DIFFERENTIAL METHOD: ABNORMAL
DISPENSE STATUS: NORMAL
EOSINOPHIL NFR BLD: 0 % (ref 0–8)
EOSINOPHIL NFR BLD: 0 % (ref 0–8)
ERYTHROCYTE [DISTWIDTH] IN BLOOD BY AUTOMATED COUNT: 12.5 % (ref 11.5–14.5)
ERYTHROCYTE [DISTWIDTH] IN BLOOD BY AUTOMATED COUNT: 12.5 % (ref 11.5–14.5)
ERYTHROCYTE [SEDIMENTATION RATE] IN BLOOD BY WESTERGREN METHOD: 26 MM/H
EST. GFR  (NO RACE VARIABLE): 21 ML/MIN/1.73 M^2
EST. GFR  (NO RACE VARIABLE): 23 ML/MIN/1.73 M^2
EST. GFR  (NO RACE VARIABLE): 28 ML/MIN/1.73 M^2
EST. GFR  (NO RACE VARIABLE): 28 ML/MIN/1.73 M^2
EST. GFR  (NO RACE VARIABLE): 32 ML/MIN/1.73 M^2
EST. GFR  (NO RACE VARIABLE): 34 ML/MIN/1.73 M^2
FIO2: 21
FIO2: 40
GIANT PLATELETS BLD QL SMEAR: PRESENT
GLUCOSE SERPL-MCNC: 106 MG/DL (ref 70–110)
GLUCOSE SERPL-MCNC: 251 MG/DL (ref 70–110)
GLUCOSE SERPL-MCNC: 289 MG/DL (ref 70–110)
GLUCOSE SERPL-MCNC: 291 MG/DL (ref 70–110)
GLUCOSE SERPL-MCNC: 302 MG/DL (ref 70–110)
GLUCOSE SERPL-MCNC: 328 MG/DL (ref 70–110)
HCO3 UR-SCNC: 12.9 MMOL/L (ref 24–28)
HCO3 UR-SCNC: 15.2 MMOL/L (ref 24–28)
HCO3 UR-SCNC: 16.8 MMOL/L (ref 24–28)
HCT VFR BLD AUTO: 35.8 % (ref 37–48.5)
HCT VFR BLD AUTO: 47.1 % (ref 37–48.5)
HGB BLD-MCNC: 12.2 G/DL (ref 12–16)
HGB BLD-MCNC: 15.5 G/DL (ref 12–16)
IMM GRANULOCYTES # BLD AUTO: ABNORMAL K/UL (ref 0–0.04)
IMM GRANULOCYTES # BLD AUTO: ABNORMAL K/UL (ref 0–0.04)
IMM GRANULOCYTES NFR BLD AUTO: ABNORMAL % (ref 0–0.5)
IMM GRANULOCYTES NFR BLD AUTO: ABNORMAL % (ref 0–0.5)
INR PPP: 1.6 (ref 0.8–1.2)
LACTATE SERPL-SCNC: 2.2 MMOL/L (ref 0.5–2.2)
LACTATE SERPL-SCNC: 2.9 MMOL/L (ref 0.5–2.2)
LACTATE SERPL-SCNC: 3.2 MMOL/L (ref 0.5–2.2)
LACTATE SERPL-SCNC: 6.8 MMOL/L (ref 0.5–2.2)
LIPASE SERPL-CCNC: 266 U/L (ref 4–60)
LYMPHOCYTES NFR BLD: 10 % (ref 18–48)
LYMPHOCYTES NFR BLD: 19 % (ref 18–48)
MAGNESIUM SERPL-MCNC: 2.5 MG/DL (ref 1.6–2.6)
MAGNESIUM SERPL-MCNC: 3.7 MG/DL (ref 1.6–2.6)
MAGNESIUM SERPL-MCNC: 4.2 MG/DL (ref 1.6–2.6)
MAGNESIUM SERPL-MCNC: 4.4 MG/DL (ref 1.6–2.6)
MCH RBC QN AUTO: 30.9 PG (ref 27–31)
MCH RBC QN AUTO: 31.3 PG (ref 27–31)
MCHC RBC AUTO-ENTMCNC: 32.9 G/DL (ref 32–36)
MCHC RBC AUTO-ENTMCNC: 34.1 G/DL (ref 32–36)
MCV RBC AUTO: 92 FL (ref 82–98)
MCV RBC AUTO: 94 FL (ref 82–98)
METAMYELOCYTES NFR BLD MANUAL: 1 %
MODE: ABNORMAL
MODE: ABNORMAL
MONOCYTES NFR BLD: 13 % (ref 4–15)
MONOCYTES NFR BLD: 17 % (ref 4–15)
MYELOCYTES NFR BLD MANUAL: 15 %
NEUTROPHILS NFR BLD: 26 % (ref 38–73)
NEUTROPHILS NFR BLD: 77 % (ref 38–73)
NEUTS BAND NFR BLD MANUAL: 22 %
NRBC BLD-RTO: 0 /100 WBC
NRBC BLD-RTO: 0 /100 WBC
NUM UNITS TRANS FFP: NORMAL
OVALOCYTES BLD QL SMEAR: ABNORMAL
PCO2 BLDA: 26.1 MMHG (ref 35–45)
PCO2 BLDA: 37.5 MMHG (ref 35–45)
PCO2 BLDA: 49.2 MMHG (ref 35–45)
PEEP: 5
PEEP: 5
PH SMN: 7.14 [PH] (ref 7.35–7.45)
PH SMN: 7.21 [PH] (ref 7.35–7.45)
PH SMN: 7.3 [PH] (ref 7.35–7.45)
PHOSPHATE SERPL-MCNC: 4 MG/DL (ref 2.7–4.5)
PHOSPHATE SERPL-MCNC: 4.3 MG/DL (ref 2.7–4.5)
PHOSPHATE SERPL-MCNC: 4.5 MG/DL (ref 2.7–4.5)
PHOSPHATE SERPL-MCNC: 6.1 MG/DL (ref 2.7–4.5)
PLATELET # BLD AUTO: 248 K/UL (ref 150–450)
PLATELET # BLD AUTO: 336 K/UL (ref 150–450)
PLATELET BLD QL SMEAR: ABNORMAL
PLATELET BLD QL SMEAR: ABNORMAL
PMV BLD AUTO: 11.1 FL (ref 9.2–12.9)
PMV BLD AUTO: 11.2 FL (ref 9.2–12.9)
PO2 BLDA: 160 MMHG (ref 80–100)
PO2 BLDA: 33 MMHG (ref 40–60)
PO2 BLDA: 95 MMHG (ref 80–100)
POC BE: -12 MMOL/L
POC BE: -13 MMOL/L
POC BE: -13 MMOL/L
POC SATURATED O2: 52 % (ref 95–100)
POC SATURATED O2: 97 % (ref 95–100)
POC SATURATED O2: 99 % (ref 95–100)
POC TCO2: 14 MMOL/L (ref 23–27)
POC TCO2: 16 MMOL/L (ref 24–29)
POC TCO2: 18 MMOL/L (ref 23–27)
POCT GLUCOSE: 139 MG/DL (ref 70–110)
POCT GLUCOSE: 233 MG/DL (ref 70–110)
POCT GLUCOSE: 269 MG/DL (ref 70–110)
POCT GLUCOSE: 315 MG/DL (ref 70–110)
POCT GLUCOSE: 81 MG/DL (ref 70–110)
POIKILOCYTOSIS BLD QL SMEAR: SLIGHT
POIKILOCYTOSIS BLD QL SMEAR: SLIGHT
POTASSIUM SERPL-SCNC: 4.2 MMOL/L (ref 3.5–5.1)
POTASSIUM SERPL-SCNC: 4.4 MMOL/L (ref 3.5–5.1)
POTASSIUM SERPL-SCNC: 4.4 MMOL/L (ref 3.5–5.1)
POTASSIUM SERPL-SCNC: 4.5 MMOL/L (ref 3.5–5.1)
POTASSIUM SERPL-SCNC: 4.8 MMOL/L (ref 3.5–5.1)
POTASSIUM SERPL-SCNC: 5.1 MMOL/L (ref 3.5–5.1)
PROT SERPL-MCNC: 4.9 G/DL (ref 6–8.4)
PROT SERPL-MCNC: 5.4 G/DL (ref 6–8.4)
PROT SERPL-MCNC: 5.5 G/DL (ref 6–8.4)
PROT SERPL-MCNC: 5.7 G/DL (ref 6–8.4)
PROTHROMBIN TIME: 16.4 SEC (ref 9–12.5)
RBC # BLD AUTO: 3.9 M/UL (ref 4–5.4)
RBC # BLD AUTO: 5.01 M/UL (ref 4–5.4)
SAMPLE: ABNORMAL
SITE: ABNORMAL
SITE: ABNORMAL
SODIUM SERPL-SCNC: 126 MMOL/L (ref 136–145)
SODIUM SERPL-SCNC: 128 MMOL/L (ref 136–145)
SODIUM SERPL-SCNC: 130 MMOL/L (ref 136–145)
SODIUM SERPL-SCNC: 130 MMOL/L (ref 136–145)
SODIUM SERPL-SCNC: 131 MMOL/L (ref 136–145)
SODIUM SERPL-SCNC: 131 MMOL/L (ref 136–145)
TRIGL SERPL-MCNC: 96 MG/DL (ref 30–150)
TSH SERPL DL<=0.005 MIU/L-ACNC: 1.82 UIU/ML (ref 0.4–4)
VT: 350
WBC # BLD AUTO: 10.41 K/UL (ref 3.9–12.7)
WBC # BLD AUTO: 13.28 K/UL (ref 3.9–12.7)

## 2023-01-14 PROCEDURE — 83605 ASSAY OF LACTIC ACID: CPT | Mod: 91

## 2023-01-14 PROCEDURE — 88307 TISSUE EXAM BY PATHOLOGIST: CPT | Mod: 26,,, | Performed by: PATHOLOGY

## 2023-01-14 PROCEDURE — 27201423 OPTIME MED/SURG SUP & DEVICES STERILE SUPPLY: Performed by: SURGERY

## 2023-01-14 PROCEDURE — 99223 1ST HOSP IP/OBS HIGH 75: CPT | Mod: ,,, | Performed by: SURGERY

## 2023-01-14 PROCEDURE — 84100 ASSAY OF PHOSPHORUS: CPT | Mod: 91

## 2023-01-14 PROCEDURE — D9220A PRA ANESTHESIA: ICD-10-PCS | Mod: ANES,,, | Performed by: STUDENT IN AN ORGANIZED HEALTH CARE EDUCATION/TRAINING PROGRAM

## 2023-01-14 PROCEDURE — 88307 PR  SURG PATH,LEVEL V: ICD-10-PCS | Mod: 26,,, | Performed by: PATHOLOGY

## 2023-01-14 PROCEDURE — 25000003 PHARM REV CODE 250: Performed by: SURGERY

## 2023-01-14 PROCEDURE — P9045 ALBUMIN (HUMAN), 5%, 250 ML: HCPCS | Mod: JG | Performed by: NURSE ANESTHETIST, CERTIFIED REGISTERED

## 2023-01-14 PROCEDURE — 88305 TISSUE EXAM BY PATHOLOGIST: CPT | Performed by: PATHOLOGY

## 2023-01-14 PROCEDURE — 25000003 PHARM REV CODE 250: Performed by: STUDENT IN AN ORGANIZED HEALTH CARE EDUCATION/TRAINING PROGRAM

## 2023-01-14 PROCEDURE — 99900035 HC TECH TIME PER 15 MIN (STAT)

## 2023-01-14 PROCEDURE — 37000008 HC ANESTHESIA 1ST 15 MINUTES: Performed by: SURGERY

## 2023-01-14 PROCEDURE — 20000000 HC ICU ROOM

## 2023-01-14 PROCEDURE — 80100014 HC HEMODIALYSIS 1:1

## 2023-01-14 PROCEDURE — 25000003 PHARM REV CODE 250

## 2023-01-14 PROCEDURE — D9220A PRA ANESTHESIA: ICD-10-PCS | Mod: CRNA,,, | Performed by: NURSE ANESTHETIST, CERTIFIED REGISTERED

## 2023-01-14 PROCEDURE — 86920 COMPATIBILITY TEST SPIN: CPT | Performed by: INTERNAL MEDICINE

## 2023-01-14 PROCEDURE — 63600175 PHARM REV CODE 636 W HCPCS: Performed by: STUDENT IN AN ORGANIZED HEALTH CARE EDUCATION/TRAINING PROGRAM

## 2023-01-14 PROCEDURE — 82803 BLOOD GASES ANY COMBINATION: CPT

## 2023-01-14 PROCEDURE — 44150 REMOVAL OF COLON: CPT | Mod: ,,, | Performed by: SURGERY

## 2023-01-14 PROCEDURE — 83605 ASSAY OF LACTIC ACID: CPT | Performed by: STUDENT IN AN ORGANIZED HEALTH CARE EDUCATION/TRAINING PROGRAM

## 2023-01-14 PROCEDURE — 88307 TISSUE EXAM BY PATHOLOGIST: CPT | Performed by: PATHOLOGY

## 2023-01-14 PROCEDURE — 25000003 PHARM REV CODE 250: Performed by: NURSE ANESTHETIST, CERTIFIED REGISTERED

## 2023-01-14 PROCEDURE — 86920 COMPATIBILITY TEST SPIN: CPT

## 2023-01-14 PROCEDURE — 83735 ASSAY OF MAGNESIUM: CPT | Mod: 91

## 2023-01-14 PROCEDURE — 85027 COMPLETE CBC AUTOMATED: CPT | Mod: 91 | Performed by: STUDENT IN AN ORGANIZED HEALTH CARE EDUCATION/TRAINING PROGRAM

## 2023-01-14 PROCEDURE — 83735 ASSAY OF MAGNESIUM: CPT | Mod: 91 | Performed by: STUDENT IN AN ORGANIZED HEALTH CARE EDUCATION/TRAINING PROGRAM

## 2023-01-14 PROCEDURE — 44150 PR REMOVAL COLON/ILEOSTOMY: ICD-10-PCS | Mod: ,,, | Performed by: SURGERY

## 2023-01-14 PROCEDURE — 87449 NOS EACH ORGANISM AG IA: CPT | Performed by: STUDENT IN AN ORGANIZED HEALTH CARE EDUCATION/TRAINING PROGRAM

## 2023-01-14 PROCEDURE — 85060 BLOOD SMEAR INTERPRETATION: CPT | Mod: ,,, | Performed by: PATHOLOGY

## 2023-01-14 PROCEDURE — 94002 VENT MGMT INPAT INIT DAY: CPT

## 2023-01-14 PROCEDURE — 36415 COLL VENOUS BLD VENIPUNCTURE: CPT | Performed by: STUDENT IN AN ORGANIZED HEALTH CARE EDUCATION/TRAINING PROGRAM

## 2023-01-14 PROCEDURE — 36000706: Performed by: SURGERY

## 2023-01-14 PROCEDURE — 83735 ASSAY OF MAGNESIUM: CPT | Performed by: STUDENT IN AN ORGANIZED HEALTH CARE EDUCATION/TRAINING PROGRAM

## 2023-01-14 PROCEDURE — 80053 COMPREHEN METABOLIC PANEL: CPT | Performed by: STUDENT IN AN ORGANIZED HEALTH CARE EDUCATION/TRAINING PROGRAM

## 2023-01-14 PROCEDURE — 88305 TISSUE EXAM BY PATHOLOGIST: ICD-10-PCS | Mod: 26,,, | Performed by: PATHOLOGY

## 2023-01-14 PROCEDURE — 86900 BLOOD TYPING SEROLOGIC ABO: CPT | Performed by: STUDENT IN AN ORGANIZED HEALTH CARE EDUCATION/TRAINING PROGRAM

## 2023-01-14 PROCEDURE — 83605 ASSAY OF LACTIC ACID: CPT | Mod: 91 | Performed by: STUDENT IN AN ORGANIZED HEALTH CARE EDUCATION/TRAINING PROGRAM

## 2023-01-14 PROCEDURE — 85060 PATHOLOGIST REVIEW: ICD-10-PCS | Mod: ,,, | Performed by: PATHOLOGY

## 2023-01-14 PROCEDURE — 84100 ASSAY OF PHOSPHORUS: CPT | Mod: 91 | Performed by: STUDENT IN AN ORGANIZED HEALTH CARE EDUCATION/TRAINING PROGRAM

## 2023-01-14 PROCEDURE — 85610 PROTHROMBIN TIME: CPT | Performed by: SURGERY

## 2023-01-14 PROCEDURE — D9220A PRA ANESTHESIA: Mod: ANES,,, | Performed by: STUDENT IN AN ORGANIZED HEALTH CARE EDUCATION/TRAINING PROGRAM

## 2023-01-14 PROCEDURE — 63600175 PHARM REV CODE 636 W HCPCS: Performed by: NURSE ANESTHETIST, CERTIFIED REGISTERED

## 2023-01-14 PROCEDURE — 84478 ASSAY OF TRIGLYCERIDES: CPT | Performed by: STUDENT IN AN ORGANIZED HEALTH CARE EDUCATION/TRAINING PROGRAM

## 2023-01-14 PROCEDURE — 27000207 HC ISOLATION

## 2023-01-14 PROCEDURE — 36000707: Performed by: SURGERY

## 2023-01-14 PROCEDURE — 80048 BASIC METABOLIC PNL TOTAL CA: CPT | Mod: 91,XB

## 2023-01-14 PROCEDURE — 88305 TISSUE EXAM BY PATHOLOGIST: CPT | Mod: 26,,, | Performed by: PATHOLOGY

## 2023-01-14 PROCEDURE — 94761 N-INVAS EAR/PLS OXIMETRY MLT: CPT

## 2023-01-14 PROCEDURE — 80053 COMPREHEN METABOLIC PANEL: CPT | Mod: 91 | Performed by: STUDENT IN AN ORGANIZED HEALTH CARE EDUCATION/TRAINING PROGRAM

## 2023-01-14 PROCEDURE — P9017 PLASMA 1 DONOR FRZ W/IN 8 HR: HCPCS | Performed by: STUDENT IN AN ORGANIZED HEALTH CARE EDUCATION/TRAINING PROGRAM

## 2023-01-14 PROCEDURE — S0030 INJECTION, METRONIDAZOLE: HCPCS | Performed by: STUDENT IN AN ORGANIZED HEALTH CARE EDUCATION/TRAINING PROGRAM

## 2023-01-14 PROCEDURE — 84100 ASSAY OF PHOSPHORUS: CPT | Performed by: STUDENT IN AN ORGANIZED HEALTH CARE EDUCATION/TRAINING PROGRAM

## 2023-01-14 PROCEDURE — 99223 PR INITIAL HOSPITAL CARE,LEVL III: ICD-10-PCS | Mod: ,,, | Performed by: SURGERY

## 2023-01-14 PROCEDURE — D9220A PRA ANESTHESIA: Mod: CRNA,,, | Performed by: NURSE ANESTHETIST, CERTIFIED REGISTERED

## 2023-01-14 PROCEDURE — 86920 COMPATIBILITY TEST SPIN: CPT | Performed by: STUDENT IN AN ORGANIZED HEALTH CARE EDUCATION/TRAINING PROGRAM

## 2023-01-14 PROCEDURE — 80048 BASIC METABOLIC PNL TOTAL CA: CPT | Mod: XB | Performed by: STUDENT IN AN ORGANIZED HEALTH CARE EDUCATION/TRAINING PROGRAM

## 2023-01-14 PROCEDURE — 85007 BL SMEAR W/DIFF WBC COUNT: CPT | Performed by: STUDENT IN AN ORGANIZED HEALTH CARE EDUCATION/TRAINING PROGRAM

## 2023-01-14 PROCEDURE — 82550 ASSAY OF CK (CPK): CPT | Performed by: STUDENT IN AN ORGANIZED HEALTH CARE EDUCATION/TRAINING PROGRAM

## 2023-01-14 PROCEDURE — 37000009 HC ANESTHESIA EA ADD 15 MINS: Performed by: SURGERY

## 2023-01-14 RX ORDER — FUROSEMIDE 10 MG/ML
40 INJECTION INTRAMUSCULAR; INTRAVENOUS ONCE
Status: COMPLETED | OUTPATIENT
Start: 2023-01-14 | End: 2023-01-14

## 2023-01-14 RX ORDER — SUCCINYLCHOLINE CHLORIDE 20 MG/ML
INJECTION INTRAMUSCULAR; INTRAVENOUS
Status: DISCONTINUED | OUTPATIENT
Start: 2023-01-14 | End: 2023-01-14

## 2023-01-14 RX ORDER — HYDROCODONE BITARTRATE AND ACETAMINOPHEN 500; 5 MG/1; MG/1
TABLET ORAL
Status: DISCONTINUED | OUTPATIENT
Start: 2023-01-14 | End: 2023-01-17

## 2023-01-14 RX ORDER — PROPOFOL 10 MG/ML
0-50 INJECTION, EMULSION INTRAVENOUS CONTINUOUS
Status: DISCONTINUED | OUTPATIENT
Start: 2023-01-14 | End: 2023-01-14

## 2023-01-14 RX ORDER — ONDANSETRON 2 MG/ML
INJECTION INTRAMUSCULAR; INTRAVENOUS
Status: DISCONTINUED | OUTPATIENT
Start: 2023-01-14 | End: 2023-01-14

## 2023-01-14 RX ORDER — FAMOTIDINE 10 MG/ML
20 INJECTION INTRAVENOUS 2 TIMES DAILY
Status: DISCONTINUED | OUTPATIENT
Start: 2023-01-14 | End: 2023-01-14

## 2023-01-14 RX ORDER — HYDROMORPHONE HYDROCHLORIDE 2 MG/ML
0.2 INJECTION, SOLUTION INTRAMUSCULAR; INTRAVENOUS; SUBCUTANEOUS EVERY 5 MIN PRN
Status: CANCELLED | OUTPATIENT
Start: 2023-01-14

## 2023-01-14 RX ORDER — ALBUMIN HUMAN 50 G/1000ML
SOLUTION INTRAVENOUS CONTINUOUS PRN
Status: DISCONTINUED | OUTPATIENT
Start: 2023-01-14 | End: 2023-01-14

## 2023-01-14 RX ORDER — ACETAMINOPHEN 325 MG/1
650 TABLET ORAL EVERY 6 HOURS PRN
Status: DISCONTINUED | OUTPATIENT
Start: 2023-01-14 | End: 2023-01-15

## 2023-01-14 RX ORDER — ENOXAPARIN SODIUM 100 MG/ML
30 INJECTION SUBCUTANEOUS EVERY 24 HOURS
Status: DISCONTINUED | OUTPATIENT
Start: 2023-01-14 | End: 2023-01-15

## 2023-01-14 RX ORDER — ONDANSETRON 2 MG/ML
4 INJECTION INTRAMUSCULAR; INTRAVENOUS DAILY PRN
Status: CANCELLED | OUTPATIENT
Start: 2023-01-14

## 2023-01-14 RX ORDER — SODIUM CHLORIDE 0.9 % (FLUSH) 0.9 %
10 SYRINGE (ML) INJECTION
Status: CANCELLED | OUTPATIENT
Start: 2023-01-14

## 2023-01-14 RX ORDER — DEXMEDETOMIDINE HYDROCHLORIDE 4 UG/ML
0-1.4 INJECTION, SOLUTION INTRAVENOUS CONTINUOUS
Status: DISCONTINUED | OUTPATIENT
Start: 2023-01-14 | End: 2023-01-15

## 2023-01-14 RX ORDER — LIDOCAINE HYDROCHLORIDE 10 MG/ML
INJECTION INFILTRATION; PERINEURAL
Status: DISCONTINUED | OUTPATIENT
Start: 2023-01-14 | End: 2023-01-14 | Stop reason: HOSPADM

## 2023-01-14 RX ORDER — METRONIDAZOLE 500 MG/100ML
500 INJECTION, SOLUTION INTRAVENOUS
Status: DISCONTINUED | OUTPATIENT
Start: 2023-01-14 | End: 2023-01-21

## 2023-01-14 RX ORDER — ETOMIDATE 2 MG/ML
INJECTION INTRAVENOUS
Status: DISCONTINUED | OUTPATIENT
Start: 2023-01-14 | End: 2023-01-14

## 2023-01-14 RX ORDER — SODIUM CHLORIDE 9 MG/ML
INJECTION, SOLUTION INTRAVENOUS ONCE
Status: COMPLETED | OUTPATIENT
Start: 2023-01-14 | End: 2023-01-14

## 2023-01-14 RX ORDER — ROCURONIUM BROMIDE 10 MG/ML
INJECTION, SOLUTION INTRAVENOUS
Status: DISCONTINUED | OUTPATIENT
Start: 2023-01-14 | End: 2023-01-14

## 2023-01-14 RX ORDER — HYDROMORPHONE HYDROCHLORIDE 1 MG/ML
0.2 INJECTION, SOLUTION INTRAMUSCULAR; INTRAVENOUS; SUBCUTANEOUS EVERY 4 HOURS PRN
Status: DISCONTINUED | OUTPATIENT
Start: 2023-01-14 | End: 2023-01-15

## 2023-01-14 RX ORDER — CHLORHEXIDINE GLUCONATE ORAL RINSE 1.2 MG/ML
15 SOLUTION DENTAL 2 TIMES DAILY
Status: DISCONTINUED | OUTPATIENT
Start: 2023-01-14 | End: 2023-01-17

## 2023-01-14 RX ORDER — FAMOTIDINE 10 MG/ML
20 INJECTION INTRAVENOUS DAILY
Status: DISCONTINUED | OUTPATIENT
Start: 2023-01-14 | End: 2023-01-17

## 2023-01-14 RX ORDER — FENTANYL CITRATE 50 UG/ML
INJECTION, SOLUTION INTRAMUSCULAR; INTRAVENOUS
Status: DISCONTINUED | OUTPATIENT
Start: 2023-01-14 | End: 2023-01-14

## 2023-01-14 RX ORDER — LIDOCAINE HCL/PF 100 MG/5ML
SYRINGE (ML) INTRAVENOUS
Status: DISCONTINUED | OUTPATIENT
Start: 2023-01-14 | End: 2023-01-14

## 2023-01-14 RX ORDER — PHENYLEPHRINE HYDROCHLORIDE 10 MG/ML
INJECTION INTRAVENOUS
Status: DISCONTINUED | OUTPATIENT
Start: 2023-01-14 | End: 2023-01-14

## 2023-01-14 RX ORDER — BUPIVACAINE HYDROCHLORIDE 5 MG/ML
INJECTION, SOLUTION PERINEURAL
Status: DISCONTINUED | OUTPATIENT
Start: 2023-01-14 | End: 2023-01-14 | Stop reason: HOSPADM

## 2023-01-14 RX ADMIN — SODIUM CHLORIDE, SODIUM LACTATE, POTASSIUM CHLORIDE, AND CALCIUM CHLORIDE 1000 ML: .6; .31; .03; .02 INJECTION, SOLUTION INTRAVENOUS at 12:01

## 2023-01-14 RX ADMIN — ETOMIDATE 18 MG: 2 INJECTION, SOLUTION INTRAVENOUS at 05:01

## 2023-01-14 RX ADMIN — HYDROCORTISONE SODIUM SUCCINATE 50 MG: 100 INJECTION, POWDER, FOR SOLUTION INTRAMUSCULAR; INTRAVENOUS at 07:01

## 2023-01-14 RX ADMIN — FENTANYL CITRATE 50 MCG: 50 INJECTION, SOLUTION INTRAMUSCULAR; INTRAVENOUS at 05:01

## 2023-01-14 RX ADMIN — FLUDROCORTISONE ACETATE 100 MCG: 0.1 TABLET ORAL at 12:01

## 2023-01-14 RX ADMIN — INSULIN DETEMIR 5 UNITS: 100 INJECTION, SOLUTION SUBCUTANEOUS at 09:01

## 2023-01-14 RX ADMIN — ROCURONIUM BROMIDE 10 MG: 10 INJECTION, SOLUTION INTRAVENOUS at 06:01

## 2023-01-14 RX ADMIN — PHENYLEPHRINE HYDROCHLORIDE 200 MCG: 10 INJECTION INTRAVENOUS at 07:01

## 2023-01-14 RX ADMIN — HYDROMORPHONE HYDROCHLORIDE 0.2 MG: 1 INJECTION, SOLUTION INTRAMUSCULAR; INTRAVENOUS; SUBCUTANEOUS at 06:01

## 2023-01-14 RX ADMIN — PHENYLEPHRINE HYDROCHLORIDE 100 MCG: 10 INJECTION INTRAVENOUS at 05:01

## 2023-01-14 RX ADMIN — CALCIUM CHLORIDE 0.5 G: 100 INJECTION, SOLUTION INTRAVENOUS at 05:01

## 2023-01-14 RX ADMIN — Medication 500 MG: at 06:01

## 2023-01-14 RX ADMIN — INSULIN DETEMIR 10 UNITS: 100 INJECTION, SOLUTION SUBCUTANEOUS at 02:01

## 2023-01-14 RX ADMIN — PHENYLEPHRINE HYDROCHLORIDE 200 MCG: 10 INJECTION INTRAVENOUS at 05:01

## 2023-01-14 RX ADMIN — HYDROCORTISONE SODIUM SUCCINATE 50 MG: 100 INJECTION, POWDER, FOR SOLUTION INTRAMUSCULAR; INTRAVENOUS at 11:01

## 2023-01-14 RX ADMIN — HYDROCORTISONE SODIUM SUCCINATE 50 MG: 100 INJECTION, POWDER, FOR SOLUTION INTRAMUSCULAR; INTRAVENOUS at 01:01

## 2023-01-14 RX ADMIN — FLUDROCORTISONE ACETATE 100 MCG: 0.1 TABLET ORAL at 09:01

## 2023-01-14 RX ADMIN — FUROSEMIDE 40 MG: 10 INJECTION, SOLUTION INTRAMUSCULAR; INTRAVENOUS at 09:01

## 2023-01-14 RX ADMIN — CEFEPIME 2 G: 2 INJECTION, POWDER, FOR SOLUTION INTRAVENOUS at 09:01

## 2023-01-14 RX ADMIN — INSULIN ASPART 2 UNITS: 100 INJECTION, SOLUTION INTRAVENOUS; SUBCUTANEOUS at 12:01

## 2023-01-14 RX ADMIN — ALBUMIN (HUMAN): 12.5 SOLUTION INTRAVENOUS at 05:01

## 2023-01-14 RX ADMIN — CHLORHEXIDINE GLUCONATE 0.12% ORAL RINSE 15 ML: 1.2 LIQUID ORAL at 09:01

## 2023-01-14 RX ADMIN — VANCOMYCIN HYDROCHLORIDE 1250 MG: 1.25 INJECTION, POWDER, LYOPHILIZED, FOR SOLUTION INTRAVENOUS at 04:01

## 2023-01-14 RX ADMIN — SODIUM CHLORIDE: 0.9 INJECTION, SOLUTION INTRAVENOUS at 03:01

## 2023-01-14 RX ADMIN — Medication 500 MG: at 11:01

## 2023-01-14 RX ADMIN — LIDOCAINE HYDROCHLORIDE 100 MG: 20 INJECTION, SOLUTION INTRAVENOUS at 05:01

## 2023-01-14 RX ADMIN — FAMOTIDINE 20 MG: 10 INJECTION, SOLUTION INTRAVENOUS at 09:01

## 2023-01-14 RX ADMIN — HYDROMORPHONE HYDROCHLORIDE 0.2 MG: 1 INJECTION, SOLUTION INTRAMUSCULAR; INTRAVENOUS; SUBCUTANEOUS at 02:01

## 2023-01-14 RX ADMIN — INSULIN DETEMIR 5 UNITS: 100 INJECTION, SOLUTION SUBCUTANEOUS at 12:01

## 2023-01-14 RX ADMIN — Medication 500 MG: at 12:01

## 2023-01-14 RX ADMIN — NOREPINEPHRINE BITARTRATE 0.34 MCG/KG/MIN: 1 INJECTION, SOLUTION, CONCENTRATE INTRAVENOUS at 02:01

## 2023-01-14 RX ADMIN — METRONIDAZOLE 500 MG: 5 INJECTION, SOLUTION INTRAVENOUS at 10:01

## 2023-01-14 RX ADMIN — ROCURONIUM BROMIDE 30 MG: 10 INJECTION, SOLUTION INTRAVENOUS at 05:01

## 2023-01-14 RX ADMIN — METRONIDAZOLE 500 MG: 5 INJECTION, SOLUTION INTRAVENOUS at 06:01

## 2023-01-14 RX ADMIN — HYDROCORTISONE SODIUM SUCCINATE 100 MG: 100 INJECTION, POWDER, FOR SOLUTION INTRAMUSCULAR; INTRAVENOUS at 07:01

## 2023-01-14 RX ADMIN — SUCCINYLCHOLINE CHLORIDE 120 MG: 20 INJECTION, SOLUTION INTRAMUSCULAR; INTRAVENOUS at 05:01

## 2023-01-14 RX ADMIN — PIPERACILLIN AND TAZOBACTAM 4.5 G: 4; .5 INJECTION, POWDER, LYOPHILIZED, FOR SOLUTION INTRAVENOUS; PARENTERAL at 12:01

## 2023-01-14 RX ADMIN — INSULIN ASPART 1 UNITS: 100 INJECTION, SOLUTION INTRAVENOUS; SUBCUTANEOUS at 01:01

## 2023-01-14 RX ADMIN — SODIUM CHLORIDE, SODIUM LACTATE, POTASSIUM CHLORIDE, AND CALCIUM CHLORIDE: .6; .31; .03; .02 INJECTION, SOLUTION INTRAVENOUS at 04:01

## 2023-01-14 RX ADMIN — ROCURONIUM BROMIDE 10 MG: 10 INJECTION, SOLUTION INTRAVENOUS at 05:01

## 2023-01-14 RX ADMIN — ALBUMIN (HUMAN): 12.5 SOLUTION INTRAVENOUS at 06:01

## 2023-01-14 RX ADMIN — VASOPRESSIN 0.04 UNITS/MIN: 20 INJECTION INTRAVENOUS at 12:01

## 2023-01-14 RX ADMIN — HYDROCORTISONE SODIUM SUCCINATE 50 MG: 100 INJECTION, POWDER, FOR SOLUTION INTRAMUSCULAR; INTRAVENOUS at 12:01

## 2023-01-14 RX ADMIN — VASOPRESSIN 0.04 UNITS/MIN: 20 INJECTION INTRAVENOUS at 07:01

## 2023-01-14 RX ADMIN — ONDANSETRON 4 MG: 2 INJECTION, SOLUTION INTRAMUSCULAR; INTRAVENOUS at 05:01

## 2023-01-14 RX ADMIN — MUPIROCIN: 20 OINTMENT TOPICAL at 09:01

## 2023-01-14 RX ADMIN — HYDROCORTISONE SODIUM SUCCINATE 50 MG: 100 INJECTION, POWDER, FOR SOLUTION INTRAMUSCULAR; INTRAVENOUS at 06:01

## 2023-01-14 RX ADMIN — INSULIN ASPART 4 UNITS: 100 INJECTION, SOLUTION INTRAVENOUS; SUBCUTANEOUS at 09:01

## 2023-01-14 RX ADMIN — ENOXAPARIN SODIUM 30 MG: 100 INJECTION SUBCUTANEOUS at 06:01

## 2023-01-14 RX ADMIN — INSULIN DETEMIR 10 UNITS: 100 INJECTION, SOLUTION SUBCUTANEOUS at 09:01

## 2023-01-14 NOTE — EICU
New Patient Evaluation    HPI:  72 F history of DM (A1C 5.8) , hypertension and celiac disease presented after a fall sustaining facial injuries. She has been having nausea, vomiting, diarrhea and abdominal pain for a day but has been having progressive weight loss. Recent dose adjustment of Lyrica.    In the ED found to be hypotensive and tachycardic. CT abdomen concerning for colonic mass with distended bowels.    Camera Assessment:  BP 99/54    O2 100% on norepinephrine  Seen weak looking, not in distress    Data:  WBC 26.16, H/H 17.2/50.7, platelets 386  Na 128, K 4.4, Cl 100, AG 18, creatinine 1.7  Lactic acid 7    Assessment and Plans:   Septic/hypovolemic shock, HINA, hypovolemic hyponatremia, ongoing fluid resuscitation. Also on stress dose steroids. On piperacillin-tazobactam, cultures in progress  Colonic mass, transferred for GI and surgery evaluation

## 2023-01-14 NOTE — CARE UPDATE
Patient's mental status improved back to baseline, respiratory status stable (spo2 of 100% on FiO2 21%) and tolerating pressure support. pH improved to 7.3/26. Still not making urine but looking improved overall. Still on 0.5 of norepi.    Extubated to room air and tolerating well. Complaining of some abdominal pain, added dilaudid 0.2mg PRN.    Nephrology consulted. Still not making urine. Repeat labs ordered to assess for possible need for dialysis.    Donna Esparza MD  Pulmonary and Critical Care Fellow  01/14/2023  12:26 PM

## 2023-01-14 NOTE — CONSULTS
[facility]  INPATIENT Consult    REASON FOR CONSULT/ADMISSION: Peritonitis    HPI: Lily Green is a 72 y.o. female transferred to [facility] from [facility] after fall.  Pt notes several days of worsening abdominal pain, distension, and relative lack of bowel function.  CT shows possible distal colonic obstruction with significantly dilated upstream colon and normal small bowel.  Pt on multiple pressors without any other obvious source.  WBC 26, lactic acid 7, Amylase >1800. +HINA with worsening Cr and minimal UOP, elevated LFTs and INR (1.6).  On chronic steroids for Celiac disease.    I have reviewed the patient's chart including prior progress notes, procedures and testing.     ROS:   Review of Systems   All other systems reviewed and are negative.    PROBLEM LIST:  Patient Active Problem List   Diagnosis    Type 2 diabetes mellitus with diabetic polyneuropathy, without long-term current use of insulin    Hyperlipidemia, mixed    GERD without esophagitis    Mild recurrent major depression    Primary insomnia    Chronic neck pain    DDD (degenerative disc disease), cervical    Screening mammogram, encounter for    Celiac disease    Hand arthritis    Benign essential tremor    Chronic pain of both knees    Irritable bowel syndrome    TANIA (stress urinary incontinence, female)    Meniere's disease of both ears    Ankle weakness    Psoriasis    Idiopathic neuropathy    Bilateral foot pain    Generalized anxiety disorder    Cognitive complaints         HISTORY  Past Medical History:   Diagnosis Date    Anxiety     Celiac disease 2018    Celiac disease     Depression     Diabetes mellitus     Family history of breast cancer in mother      at age 68    Hyperlipidemia     Hypertension     Meniere disease     Menopause     Peptic ulcer     Reflux esophagitis     Urinary tract infection     Vaginal infection     Vaginal Pap smear 2014    Pap/Hpv Negative        Past Surgical History:   Procedure  Laterality Date    APPENDECTOMY      BREAST SURGERY      Tags    CARPAL TUNNEL RELEASE Bilateral 09/2017    ,     COLONOSCOPY  04/2018    Normal  ( Juan A)     DILATION AND CURETTAGE OF UTERUS  1986    DUPUYTREN CONTRACTURE RELEASE Bilateral 09/2017    HYSTERECTOMY  1987    BEAU w/ appy, no BSO     INJECTION OF NEUROLYTIC AGENT AROUND LUMBAR SYMPATHETIC NERVE N/A 1/6/2022    Procedure: BLOCK, LUMBAR SYMPATHETIC;  Surgeon: Souleymane Rizo Jr., MD;  Location: [facility];  Service: Pain Management;  Laterality: N/A;  no pacemaker   pt is diabetic     INJECTION OF NEUROLYTIC AGENT AROUND LUMBAR SYMPATHETIC NERVE N/A 8/25/2022    Procedure: BLOCK, LUMBAR SYMPATHETIC;  Surgeon: Souleymane Rizo Jr., MD;  Location: [facility];  Service: Pain Management;  Laterality: N/A;  diabetic     SHOULDER SURGERY  2009 & 2010    right rotator cuff    TONSILLECTOMY      UPPER GASTROINTESTINAL ENDOSCOPY  04/2018       Social History     Tobacco Use    Smoking status: Never    Smokeless tobacco: Never   Substance Use Topics    Alcohol use: No    Drug use: Never       Family History   Problem Relation Age of Onset    Vision loss Father     Arthritis Father     Breast cancer Mother     Cancer Mother     Vision loss Mother     Hyperlipidemia Sister     Breast cancer Paternal Aunt     Cancer Paternal Aunt     Diabetes Paternal Grandfather     Vision loss Sister     Kidney disease Neg Hx          MEDS:  Current Facility-Administered Medications on File Prior to Encounter   Medication Dose Route Frequency Provider Last Rate Last Admin    [COMPLETED] 0.9%  NaCl infusion  1,000 mL Intravenous ED 1 Time Malick Vo MD   Stopped at 01/13/23 1840    [COMPLETED] bisacodyL suppository 10 mg  10 mg Rectal ED 1 Time Malick Vo MD   10 mg at 01/13/23 1609    [COMPLETED] iohexoL (OMNIPAQUE 350) injection 75 mL  75 mL Intravenous ONCE PRN Malick Vo MD   75 mL at 01/13/23 1526    [COMPLETED] lactated ringers bolus 1,000  mL  1,000 mL Intravenous ED 1 Time Malick Vo MD   Stopped at 01/13/23 1626    [COMPLETED] lactated ringers bolus 800 mL  800 mL Intravenous ED 1 Time Malick Vo MD   Stopped at 01/13/23 1721    [COMPLETED] lactated ringers infusion  1,000 mL Intravenous ED 1 Time Martin Escalera  mL/hr at 01/13/23 1830 1,000 mL at 01/13/23 1830    [COMPLETED] LIDOcaine HCL 10 mg/ml (1%) injection 10 mL  10 mL Infiltration ED 1 Time Malick Vo MD   10 mL at 01/13/23 1653    [COMPLETED] piperacillin-tazobactam (ZOSYN) 4.5 g in dextrose 5 % in water (D5W) 5 % 100 mL IVPB (MB+)  4.5 g Intravenous ED 1 Time Malick Vo MD   Stopped at 01/13/23 1651    [COMPLETED] sodium chloride 0.9% bolus 500 mL 500 mL  500 mL Intravenous ED 1 Time Malick Vo MD   Stopped at 01/13/23 1906    [DISCONTINUED] NORepinephrine 4 mg in dextrose 5 % 250 mL infusion  0-3 mcg/kg/min Intravenous Continuous Martin Escalera DO   Stopped at 01/13/23 2131    [DISCONTINUED] NORepinephrine bitartrate-NaCl 8 mg/250 mL (32 mcg/mL) infusion  0-3 mcg/kg/min Intravenous Continuous Martin Escalera DO        [DISCONTINUED] NORepinephrine bitartrate-NaCl 8 mg/250 mL (32 mcg/mL) infusion  0-3 mcg/kg/min Intravenous Continuous Martin Escalera .6 mL/hr at 01/13/23 2131 1 mcg/kg/min at 01/13/23 2131     Current Outpatient Medications on File Prior to Encounter   Medication Sig Dispense Refill    alpha lipoic acid 600 mg Cap       atorvastatin (LIPITOR) 40 MG tablet TAKE 1 TABLET BY MOUTH ONCE DAILY 90 tablet 3    betahistine HCl (BETAHISTINE, BULK, MISC)       blood sugar diagnostic (TRUE METRIX GLUCOSE TEST STRIP) Strp 1 strip by Misc.(Non-Drug; Combo Route) route 2 (two) times a day. 200 each 3    blood-glucose meter kit Use to test twice a day 1 each 0    calcium carbonate/vitamin D3 (CALTRATE WITH VITAMIN D3 ORAL) Take by mouth.      cetirizine HCl (ZYRTEC ORAL)       cinnamon bark (CINNAMON ORAL) Take by mouth.       coenzyme Q10 100 mg capsule       cyanocobalamin 1,000 mcg/mL injection Inject 1 mL (1,000 mcg total) into the muscle every 30 days. 10 mL 3    dicyclomine (BENTYL) 10 MG capsule       DULoxetine (CYMBALTA) 30 MG capsule Take 1 capsule (30 mg total) by mouth 2 (two) times daily. 60 capsule 11    econazole nitrate 1 % cream Apply topically 2 (two) times daily. 30 g 2    finasteride (PROSCAR) 5 mg tablet       flaxseed oil 1,000 mg Cap once a day      gluc-condr-om3-dha-epa-fish-st 220-161-60-54 mg Cap once a day      glucosamine HCl 1,500 mg Tab Take 1,500 mg by mouth.      KRILL OIL ORAL Take by mouth.      lancets Misc 1 lancet by Misc.(Non-Drug; Combo Route) route once daily. 200 each 3    meclizine (ANTIVERT) 25 mg tablet Take 1 tablet (25 mg total) by mouth 3 (three) times daily as needed for Dizziness. 20 tablet 0    metFORMIN (GLUCOPHAGE) 500 MG tablet Take 1 tablet (500 mg total) by mouth 3 (three) times daily. 2 po qam, 1 po qhs. (Patient taking differently: Take 500 mg by mouth 2 (two) times daily. 1 po qam, 1 po qhs.) 270 tablet 3    MILK THISTLE ORAL Take by mouth.      pantoprazole (PROTONIX) 40 MG tablet TAKE 1 TABLET BY MOUTH ONCE DAILY  90 tablet 3    pregabalin (LYRICA) 150 MG capsule Take 1 capsule (150 mg total) by mouth 2 (two) times daily. 60 capsule 0    temazepam (RESTORIL) 30 mg capsule TAKE ONE CAPSULE BY MOUTH NIGHTLY AS NEEDED FOR INSOMNIA 30 capsule 2    triamcinolone acetonide 0.1% (KENALOG) 0.1 % ointment Apply topically 2 (two) times daily as needed (rash). 80 g 11    triamterene-hydrochlorothiazide 37.5-25 mg (MAXZIDE-25) 37.5-25 mg per tablet       vit C,Y-Jx-msirj-lutein-zeaxan (PRESERVISION AREDS 2) 731-607-46-1 mg-unit-mg-mg Cap       vitamins  A,C,E-zinc-copper 14,320-226-200 unit-mg-unit Cap Take 1 tablet by mouth once daily.         ALLERGIES:  Review of patient's allergies indicates:   Allergen Reactions    Crestor [rosuvastatin] Swelling    Gluten protein           VITALS:  Temp:  [98.1 °F (36.7 °C)-100.1 °F (37.8 °C)] 100.1 °F (37.8 °C)  Pulse:  [] 96  Resp:  [16-36] 23  SpO2:  [82 %-100 %] 100 %  BP: ()/(46-79) 113/69    No intake/output data recorded.      PHYSICAL EXAM:  Physical Exam  Constitutional:       Appearance: Normal appearance. She is ill-appearing and toxic-appearing.   HENT:      Head: Normocephalic and atraumatic.   Pulmonary:      Effort: Pulmonary effort is normal.   Abdominal:      General: There is distension.      Tenderness: There is abdominal tenderness. There is guarding and rebound.   Skin:     General: Skin is warm and dry.   Neurological:      Mental Status: Mental status is at baseline.   Psychiatric:         Mood and Affect: Mood normal.         Behavior: Behavior normal.         LABS:  Lab Results   Component Value Date    WBC 10.41 01/14/2023    RBC 5.01 01/14/2023    HGB 15.5 01/14/2023    HCT 47.1 01/14/2023     01/14/2023     Lab Results   Component Value Date     (H) 01/14/2023     (L) 01/14/2023    K 4.5 01/14/2023    CL 98 01/14/2023    CO2 12 (L) 01/14/2023    BUN 58 (H) 01/14/2023    CREATININE 1.9 (H) 01/14/2023    CALCIUM 7.5 (L) 01/14/2023     Lab Results   Component Value Date    ALT 96 (H) 01/14/2023     (H) 01/14/2023    ALKPHOS 245 (H) 01/14/2023    BILITOT 0.6 01/14/2023     Lab Results   Component Value Date    MG 4.2 (H) 01/14/2023    PHOS 4.3 01/14/2023       STUDIES:  CT abd/pelvis images and reports were personally reviewed.        ASSESSMENT & PLAN:  72 y.o. female with Multi-organ system failure with peritonitis on exam.  To OR for emergent Ex Lap , likely total colectomy.  Discussed with patient and her .  FFP ordered for elevated INR.  Risks, benefits, and alternatives to the procedure were explained to the patient in detail.  All questions were answered and the patient has requested the procedure be done.  Informed consent was obtained.        Manuel Javier M.D.,  NABILA.  [facility]  [facility]

## 2023-01-14 NOTE — TRANSFER OF CARE
"Anesthesia Transfer of Care Note    Patient: Lily Green    Procedure(s) Performed: Procedure(s) (LRB):  LAPAROTOMY, EXPLORATORY (N/A)    Patient location: ICU    Anesthesia Type: general    Transport from OR: Transported from OR intubated on 100% O2 by AMBU with adequate controlled ventilation    Post pain: adequate analgesia    Post assessment: no apparent anesthetic complications and tolerated procedure well    Post vital signs: stable    Level of consciousness: obtunded and unresponsive    Nausea/Vomiting: no nausea/vomiting    Complications: none    Transfer of care protocol was followed      Last vitals:   Visit Vitals  /63   Pulse 90   Temp 37 °C (98.6 °F) (Axillary)   Resp (!) 41   Ht 5' 5" (1.651 m)   Wt 61 kg (134 lb 7.7 oz)   LMP  (LMP Unknown)   SpO2 98%   BMI 22.38 kg/m²     "

## 2023-01-14 NOTE — PROGRESS NOTES
[facility]  History & Physical  Family Medicine       Admit Date: 1/13/2023   LOS: 1 day     Chief Complaint/Reason for Admission: Shock    HPI     Patient is a 71 y/o female with PMHx of diabetes type 2 (treated with metformin), HTN, and celiac disease (following a strict diet) who presented to OSH after falling and injuring her face while getting out of bed. The patient reported experiencing nausea, vomiting, diarrhea, abdominal pain, and weakness starting the day before. She has had a weight loss of 60 pounds over an extended period of time, which may have been partially caused by her diet. The patient's review of systems was otherwise normal. Of note patient is currently taking Lyrica, which has recently been increased. On evaluation at bedside, patient feels good just with mild abdominal pain. No other symptoms. Denies LOC with falls, does not remember if she hit her head.     ED Course: Presented hypotensive and tachycardic. Labs showed elevated WBC of 26.16, Glucose 269, , LA 7, and Beta-hydroxybutyrate 0.6. She was acidotic with ph of 7.282 and AG 21. Rest of the labs unremarkable. CT ab/pel revealed distention of the colon with a large amount of stool and fluid, and a segment of the colon that appeared narrowed with possible thickening of the wall, strongly suspected to be a colonic mass. S/p enema and disimpaction. The patient was given fluids (3 L) and antibiotics and transferred for further evaluation and possible surgery       Interval History: Overnight requiring continued pressor support but VSS. Mentation good but does appear slightly confused at times. Due to concern of ischemic bowel taking to surgery. Tolerated surgery well, parts of bowel noted to be necrotic. Has had little to no UOP with aggressive IVF.        PMHx  Past Medical History:   Diagnosis Date    Anxiety     Celiac disease 05/2018    Celiac disease     Depression     Diabetes mellitus     Family history of  breast cancer in mother      at age 68    Hyperlipidemia     Hypertension     Meniere disease     Menopause     Peptic ulcer     Reflux esophagitis     Urinary tract infection     Vaginal infection     Vaginal Pap smear 2014    Pap/Hpv Negative         PSHx  Past Surgical History:   Procedure Laterality Date    APPENDECTOMY      BREAST SURGERY      Tags    CARPAL TUNNEL RELEASE Bilateral 2017    ,     COLONOSCOPY  2018    Normal  ( Juan A)     DILATION AND CURETTAGE OF UTERUS  1986    DUPUYTREN CONTRACTURE RELEASE Bilateral 2017    HYSTERECTOMY  1987    BEAU w/ appy, no BSO     INJECTION OF NEUROLYTIC AGENT AROUND LUMBAR SYMPATHETIC NERVE N/A 2022    Procedure: BLOCK, LUMBAR SYMPATHETIC;  Surgeon: Souleymane Rizo Jr., MD;  Location: [facility];  Service: Pain Management;  Laterality: N/A;  no pacemaker   pt is diabetic     INJECTION OF NEUROLYTIC AGENT AROUND LUMBAR SYMPATHETIC NERVE N/A 2022    Procedure: BLOCK, LUMBAR SYMPATHETIC;  Surgeon: Souleymane Rizo Jr., MD;  Location: [facility];  Service: Pain Management;  Laterality: N/A;  diabetic     SHOULDER SURGERY   &     right rotator cuff    TONSILLECTOMY      UPPER GASTROINTESTINAL ENDOSCOPY  2018       Family History  Family History   Problem Relation Age of Onset    Vision loss Father     Arthritis Father     Breast cancer Mother     Cancer Mother     Vision loss Mother     Hyperlipidemia Sister     Breast cancer Paternal Aunt     Cancer Paternal Aunt     Diabetes Paternal Grandfather     Vision loss Sister     Kidney disease Neg Hx        Social  Social History     Socioeconomic History    Marital status:    Tobacco Use    Smoking status: Never    Smokeless tobacco: Never   Substance and Sexual Activity    Alcohol use: No    Drug use: Never    Sexual activity: Not Currently     Partners: Male     Birth control/protection: See Surgical Hx     Comment: :      Social Determinants of  Health     Financial Resource Strain: Low Risk     Difficulty of Paying Living Expenses: Not hard at all   Food Insecurity: No Food Insecurity    Worried About Running Out of Food in the Last Year: Never true    Ran Out of Food in the Last Year: Never true   Transportation Needs: No Transportation Needs    Lack of Transportation (Medical): No    Lack of Transportation (Non-Medical): No   Physical Activity: Sufficiently Active    Days of Exercise per Week: 7 days    Minutes of Exercise per Session: 50 min   Stress: Stress Concern Present    Feeling of Stress : Very much   Social Connections: Unknown    Frequency of Communication with Friends and Family: More than three times a week    Frequency of Social Gatherings with Friends and Family: More than three times a week    Active Member of Clubs or Organizations: Yes    Attends Club or Organization Meetings: More than 4 times per year    Marital Status:    Housing Stability: Low Risk     Unable to Pay for Housing in the Last Year: No    Number of Places Lived in the Last Year: 1    Unstable Housing in the Last Year: No         Allergies  Review of patient's allergies indicates:   Allergen Reactions    Crestor [rosuvastatin] Swelling    Gluten protein        ROS- As per HPI, otherwise negative  Review of Systems   Constitutional:  Negative for chills, fever and malaise/fatigue.   HENT:  Negative for congestion, sinus pain and sore throat.    Eyes:  Negative for blurred vision and discharge.   Respiratory:  Negative for cough and shortness of breath.    Cardiovascular:  Negative for chest pain and leg swelling.   Gastrointestinal:  Positive for abdominal pain. Negative for diarrhea, heartburn, nausea and vomiting.   Genitourinary:  Negative for dysuria and hematuria.   Musculoskeletal:  Negative for back pain.   Skin:  Negative for rash.   Neurological:  Negative for dizziness and headaches.   Psychiatric/Behavioral:  The patient is not nervous/anxious.       Objective  Vitals(Most Recent)      Temp: 100.1 °F (37.8 °C) (01/13/23 2235)  Pulse: 94 (01/14/23 0430)  Resp: (!) 26 (01/14/23 0430)  BP: 117/68 (01/14/23 0430)  SpO2: (!) 92 % (01/14/23 0430)           Vitals Range  Temp:  [98.1 °F (36.7 °C)-100.1 °F (37.8 °C)]   Pulse:  []   Resp:  [16-36]   BP: ()/(46-79)   SpO2:  [82 %-100 %]     I/O  I/O last 3 completed shifts:  In: 2878.1 [I.V.:733.4; Blood:250; NG/GT:45; IV Piggyback:1849.8]  Out: 350 [Urine:50; Stool:300]    Intake/Output Summary (Last 24 hours) at 1/14/2023 0743  Last data filed at 1/14/2023 0642  Gross per 24 hour   Intake 2878.14 ml   Output 350 ml   Net 2528.14 ml         Physical Exam  Physical Exam  Constitutional:       General: She is not in acute distress.     Appearance: Normal appearance. She is normal weight.   HENT:      Head: Normocephalic and atraumatic.      Comments: Facial laceration on lip and ecchymosis of L eye     Mouth/Throat:      Mouth: Mucous membranes are moist.      Pharynx: Oropharynx is clear.   Eyes:      Extraocular Movements: Extraocular movements intact.      Conjunctiva/sclera: Conjunctivae normal.      Pupils: Pupils are equal, round, and reactive to light.   Cardiovascular:      Rate and Rhythm: Normal rate and regular rhythm.      Pulses: Normal pulses.      Heart sounds: Normal heart sounds.   Pulmonary:      Effort: Pulmonary effort is normal.      Breath sounds: Normal breath sounds.   Abdominal:      General: Abdomen is flat. Bowel sounds are absent. There is no distension.      Palpations: Abdomen is soft.      Tenderness: There is abdominal tenderness in the left lower quadrant. There is no guarding.   Musculoskeletal:      Cervical back: Normal range of motion.      Right lower leg: No edema.      Left lower leg: No edema.   Skin:     General: Skin is warm.      Capillary Refill: Capillary refill takes 2 to 3 seconds.   Neurological:      General: No focal deficit present.      Mental Status: She  is alert and oriented to person, place, and time.   Psychiatric:         Mood and Affect: Mood normal.         Behavior: Behavior normal.       Labs  Recent Labs   Lab 01/14/23 0317   WBC 10.41   RBC 5.01   HGB 15.5   HCT 47.1      MCV 94   MCH 30.9   MCHC 32.9      Recent Labs   Lab 01/14/23 0317   CALCIUM 7.5*   PROT 5.7*   *   K 4.5   CO2 12*   CL 98   BUN 58*   CREATININE 1.9*   ALKPHOS 245*   ALT 96*   *   BILITOT 0.6        Lab Results   Component Value Date    INR 1.6 (H) 01/14/2023       Recent Labs     01/13/23  1418   TROPONINI <0.012       A1c:   Lab Results   Component Value Date    HGBA1C 5.8 (H) 11/01/2022   , Last Gluc:   Recent Labs   Lab 01/13/23  1718 01/13/23  2248 01/14/23  0155   POCTGLUCOSE 212* 306* 269*       TSH:   Lab Results   Component Value Date    TSH 1.821 01/13/2023       UA  Urinalysis  No results for input(s): COLORU, CLARITYU, SPECGRAV, PHUR, PROTEINUA, GLUCOSEU, BILIRUBINCON, BLOODU, WBCU, RBCU, BACTERIA, MUCUS, NITRITE, LEUKOCYTESUR, UROBILINOGEN, HYALINECASTS in the last 24 hours.  No results for input(s): COLORU, CLARITYU, SPECGRAV, PHUR, PROTEINUA, GLUCOSEU, BLOODU, WBCU, RBCU, BACTERIA, MUCUS in the last 24 hours.    Invalid input(s):  BILIRUBINCON    Micro  Microbiology Results (last 7 days)       Procedure Component Value Units Date/Time    Clostridium difficile EIA [[id number]] Collected: 01/14/23 0241    Order Status: Sent Specimen: Stool Updated: 01/14/23 0336             ABG  Recent Labs   Lab 01/13/23  1536 01/14/23 0243   PH 7.282* 7.215*   PCO2 41.0 37.5   PO2 37* 33*   HCO3 19.4* 15.2*   POCSATURATED 63* 52*   BE -7 -13       Imaging           Assessment/Plan:  Lily Green is a 72 y.o. female patient w/ PMHx of diabetes type 2 (treated with metformin), HTN, and celiac disease (following a strict diet)     Here with   1. Shock  2. Poss ischemic bowel/ acute abdomen     Neuro/Psych  Appears lethargic, slightly confused, but does  answer questions appropriately  H/o of frequent falls  Home Lyrica recently increased.  CTH unremarkable  - Continue to monitor     CV  #Shock  Poss septic, LA 7 > 6.8  Responsive to fluids  Pressor requirements decreasing  - Will continue aggressive fluid resuscitation  - Will continue abx  - IV pressor support prn   - F/u echo  - Trend lactate        Pulm  On RA no issues        FEN/GI  #Ischemic bowel  #Acute Abdomen  #Colonic mass/ obstruction  Although abdomen non concerning, imaging concerning  Also elevated LA concerning for ischemic bowel  Amylase greatly elevated pointing to ischemia  CT ab/pel revealing distention of the colon with a large amount of stool and fluid, and a segment of the colon that appeared narrowed with possible thickening of the wall, strongly suspected to be a colonic mass  Surgery s/p colon resection revealed no evidence of mass  No evidence of prior CV disease  Etiology uknown  Bladder pressure wnl  Gen surg consulted  - S/p bowel resection, tolerated well, will f/u with formal report  - Diet: NPO  - Keep Mg 2, K 4  - Zofran prn for n/v   - F/u Gen surg recs     #Pancreatitis?  Lipase elevated 4-5x limit of upper normal  Will investigate if potential pancreatitis component    #hypocalcemia  Stable, monitor        RENAL  #HINA  Likely 2/2 to shock  BUN/Cr: 58/1.7 > 60/1.9  UOP: minimal despite aggressive IVF  Strict I&Os  Avoid renal toxic meds   - Continue to monitor renal status and urine output  - Will try to diurese w/ Lasix, May need to consider HD if no still no UOP       Heme  H/Hstable  WBC 26 > 10, monitor  PT/INR 16.4/1.6  DVT prophylaxis: SCD         Endo  #poss starvation ketoacidosis  Elevated Bgs, AG, and slight B-hydrox  - Supportive management  - Maintain glucose 140-180  - IVF prn  - SSI and long acting  - F/u BMP       ID  Diarrhea concerning for infectious etiology  - Stool smells like C-diff, will empirically treat  - Stool Samples ordered, f/u  - Transitioning abx to  oral vanc, will continue metron and Cef  - Blood culture ordered           DVT PPx: SCD, transition to Lovenox when appropriate  Diet: NPO  GI PPX: None  Deconditioning: PT/OT  Code Status: Full        Dispo  - S/p bowel resection, Will f/u with surg recs. Continue current management for now      1/14/2023 Luis Gaytan M.D., -II  [facility]

## 2023-01-14 NOTE — ANESTHESIA PREPROCEDURE EVALUATION
01/14/2023  Lily Green is a 72 y.o., female.  [facility]  Anesthesia Pre-Operative Evaluation       Patient Name: Lily Green  YOB: 1950  MRN: [id number]  CSN: [id number]      Code Status: Full Code   Date of Procedure: 1/14/2023  Anesthesia: General Procedure: Procedure(s) (LRB):  LAPAROTOMY, EXPLORATORY (N/A)  Pre-Operative Diagnosis: Mass of abdomen [R19.00]  Proceduralist: Surgeon(s) and Role:     * Manuel Javier MD - Primary        SUBJECTIVE:   Lily Green is a 72 y.o. female who  has a past medical history of Anxiety, Celiac disease (05/2018), Celiac disease, Depression, Diabetes mellitus, Family history of breast cancer in mother, Hyperlipidemia, Hypertension, Meniere disease, Menopause, Peptic ulcer, Reflux esophagitis, Urinary tract infection, Vaginal infection, and Vaginal Pap smear (04/07/2014). No notes on file    she has a current medication list which includes the following long-term medication(s): atorvastatin, blood-glucose meter, duloxetine, econazole nitrate, finasteride, meclizine, metformin, pantoprazole, pregabalin, and triamcinolone acetonide 0.1%.   ALLERGIES:     Review of patient's allergies indicates:   Allergen Reactions    Crestor [rosuvastatin] Swelling    Gluten protein      LDA:      Lines/Drains/Airways     Central Venous Catheter Line  Duration           Percutaneous Central Line Insertion/Assessment - Triple Lumen  01/13/23 2030 left internal jugular <1 day              MEDICATIONS:     Antibiotics (From admission, onward)    Start     Stop Route Frequency Ordered    01/14/23 0900  mupirocin 2 % ointment         01/19 0859 Nasl 2 times daily 01/13/23 2250    01/14/23 0900  ceFEPIme (MAXIPIME) 2 g in dextrose 5 % in water (D5W) 5 % 50 mL IVPB (MB+)         -- IV Every 24 hours (non-standard times)  01/14/23 0230    01/14/23 0900  metronidazole IVPB 500 mg         -- IV Every 8 hours (non-standard times) 01/14/23 0230    01/14/23 0330  vancomycin 1,250 mg in dextrose 5 % 250 mL IVPB (Vial-Mate)         -- IV Once 01/14/23 0233    01/14/23 0329  vancomycin - pharmacy to dose  (vancomycin IVPB)        See [facility] for full Linked Orders Report.    -- IV pharmacy to manage frequency 01/14/23 0230        VTE Risk Mitigation (From admission, onward)         Ordered     IP VTE HIGH RISK PATIENT  Once         01/13/23 2304     Place sequential compression device  Until discontinued         01/13/23 2304              Current Facility-Administered Medications   Medication Dose Route Frequency Provider Last Rate Last Admin    0.9%  NaCl infusion (for blood administration)   Intravenous Q24H PRN Scooby Miller MD        acetaminophen tablet 650 mg  650 mg Oral Q6H PRN Jermaine Snell DO        ceFEPIme (MAXIPIME) 2 g in dextrose 5 % in water (D5W) 5 % 50 mL IVPB (MB+)  2 g Intravenous Q24H Scooby Miller MD        dextrose 10% bolus 125 mL 125 mL  12.5 g Intravenous PRN Sylvia Dumont MD        fludrocortisone tablet 100 mcg  100 mcg Oral Daily Scooby Miller MD   100 mcg at 01/14/23 0000    glucagon (human recombinant) injection 1 mg  1 mg Intramuscular PRN Luis Gaytan MD        hydrocortisone sodium succinate injection 50 mg  50 mg Intravenous Q6H Scooby Miller MD   50 mg at 01/14/23 0110    insulin aspart U-100 pen 0-5 Units  0-5 Units Subcutaneous Q6H PRN Luis Gaytan MD   1 Units at 01/14/23 0153    insulin detemir U-100 pen 5 Units  5 Units Subcutaneous QHS Luis Gaytan MD   5 Units at 01/14/23 0030    LIDOcaine 5 % patch 1 patch  1 patch Transdermal Daily PRN Luis Gaytan MD        melatonin tablet 9 mg  9 mg Oral Nightly PRN Luis Gaytan MD        metronidazole IVPB 500 mg  500 mg Intravenous Q8H Scooby Miller MD        mupirocin 2 % ointment   Nasal BID Sylvia Dumont MD         NORepinephrine 32 mg in dextrose 5 % 250 mL infusion  0-3 mcg/kg/min (Dosing Weight) Intravenous Continuous Luis Gaytan MD 13.7 mL/hr at 23 230 0.48 mcg/kg/min at 23 230    sodium chloride 0.9% flush 5 mL  5 mL Intravenous PRN Luis Gaytan MD        vancomycin - pharmacy to dose   Intravenous pharmacy to manage frequency Scooby MITUL Miller MD        vancomycin 1,250 mg in dextrose 5 % 250 mL IVPB (Vial-Mate)  20 mg/kg (Dosing Weight) Intravenous Once Sylvia Dumont MD        vasopressin (PITRESSIN) 0.2 Units/mL in dextrose 5 % 100 mL infusion  0.04 Units/min Intravenous Continuous Scooby MITUL Miller MD 12 mL/hr at 23 0013 0.04 Units/min at 23 0013          History:   There are no hospital problems to display for this patient.    Patient Active Problem List   Diagnosis    Type 2 diabetes mellitus with diabetic polyneuropathy, without long-term current use of insulin    Hyperlipidemia, mixed    GERD without esophagitis    Mild recurrent major depression    Primary insomnia    Chronic neck pain    DDD (degenerative disc disease), cervical    Screening mammogram, encounter for    Celiac disease    Hand arthritis    Benign essential tremor    Chronic pain of both knees    Irritable bowel syndrome    TANIA (stress urinary incontinence, female)    Meniere's disease of both ears    Ankle weakness    Psoriasis    Idiopathic neuropathy    Bilateral foot pain    Generalized anxiety disorder    Cognitive complaints     Past Medical History:   Diagnosis Date    Anxiety     Celiac disease 2018    Celiac disease     Depression     Diabetes mellitus     Family history of breast cancer in mother      at age 68    Hyperlipidemia     Hypertension     Meniere disease     Menopause     Peptic ulcer     Reflux esophagitis     Urinary tract infection     Vaginal infection     Vaginal Pap smear 2014    Pap/Hpv Negative        Surgical History:    has a past  surgical history that includes Tonsillectomy; Appendectomy; Dilation and curettage of uterus (1986); Hysterectomy (1987); Carpal tunnel release (Bilateral, 09/2017); Shoulder surgery (2009 & 2010); Colonoscopy (04/2018); Upper gastrointestinal endoscopy (04/2018); Dupuytren contracture release (Bilateral, 09/2017); Breast surgery; Injection of neurolytic agent around lumbar sympathetic nerve (N/A, 1/6/2022); and Injection of neurolytic agent around lumbar sympathetic nerve (N/A, 8/25/2022).   Social History:     reports that she has never smoked. She has never used smokeless tobacco. She reports that she does not drink alcohol and does not use drugs.     OBJECTIVE:     Vital Signs (Most Recent):  Temp: 37.8 °C (100.1 °F) (01/13/23 2235)  Pulse: 105 (01/13/23 2235)  Resp: (!) 26 (01/13/23 2235)  BP: 91/60 (01/13/23 2235) Vital Signs Range (Last 24H):  Temp:  [36.7 °C (98.1 °F)-37.8 °C (100.1 °F)]   Pulse:  []   Resp:  [16-36]   BP: ()/(46-79)   SpO2:  [82 %-100 %]        Body mass index is 22.38 kg/m².   Wt Readings from Last 4 Encounters:   01/13/23 61 kg (134 lb 7.7 oz)   01/13/23 59 kg (130 lb)   01/05/23 60.6 kg (133 lb 9.6 oz)   11/03/22 60.6 kg (133 lb 11.3 oz)     Significant Labs:  Lab Results   Component Value Date    WBC 26.16 (H) 01/13/2023    HGB 17.2 (H) 01/13/2023    HCT 50.7 (H) 01/13/2023     01/13/2023     (L) 01/14/2023    K 4.4 01/14/2023     01/14/2023    CREATININE 1.9 (H) 01/14/2023    BUN 60 (H) 01/14/2023    CO2 12 (L) 01/14/2023     (H) 01/14/2023    CALCIUM 6.9 (LL) 01/14/2023    MG 4.4 (H) 01/13/2023    PHOS 4.5 01/13/2023    ALKPHOS 264 (H) 01/13/2023    ALT 83 (H) 01/13/2023     (H) 01/13/2023    ALBUMIN 2.6 (L) 01/13/2023    INR 1.6 (H) 01/14/2023    HGBA1C 5.8 (H) 11/01/2022    TROPONINI <0.012 01/13/2023     No LMP recorded (lmp unknown). Patient has had a hysterectomy.  Recent Results (from the past 72 hour(s))   CBC auto differential     Collection Time: 01/13/23  2:09 PM   Result Value Ref Range    WBC 26.16 (H) 3.90 - 12.70 K/uL    RBC 5.50 (H) 4.00 - 5.40 M/uL    Hemoglobin 17.2 (H) 12.0 - 16.0 g/dL    Hematocrit 50.7 (H) 37.0 - 48.5 %    MCV 92 82 - 98 fL    MCH 31.3 (H) 27.0 - 31.0 pg    MCHC 33.9 32.0 - 36.0 g/dL    RDW 12.3 11.5 - 14.5 %    Platelets 386 150 - 450 K/uL    MPV 10.4 9.2 - 12.9 fL    Immature Granulocytes 0.5 0.0 - 0.5 %    Gran # (ANC) 22.1 (H) 1.8 - 7.7 K/uL    Immature Grans (Abs) 0.14 (H) 0.00 - 0.04 K/uL    Lymph # 0.9 (L) 1.0 - 4.8 K/uL    Mono # 2.9 (H) 0.3 - 1.0 K/uL    Eos # 0.0 0.0 - 0.5 K/uL    Baso # 0.08 0.00 - 0.20 K/uL    nRBC 0 0 /100 WBC    Gran % 84.6 (H) 38.0 - 73.0 %    Lymph % 3.6 (L) 18.0 - 48.0 %    Mono % 11.0 4.0 - 15.0 %    Eosinophil % 0.0 0.0 - 8.0 %    Basophil % 0.3 0.0 - 1.9 %    Differential Method Automated    Comprehensive metabolic panel    Collection Time: 01/13/23  2:09 PM   Result Value Ref Range    Sodium 135 (L) 136 - 145 mmol/L    Potassium 4.1 3.5 - 5.1 mmol/L    Chloride 96 95 - 110 mmol/L    CO2 18 (L) 23 - 29 mmol/L    Glucose 269 (H) 70 - 110 mg/dL    BUN 46 (H) 7 - 17 mg/dL    Creatinine 0.88 0.50 - 1.40 mg/dL    Calcium 8.8 8.7 - 10.5 mg/dL    Total Protein 9.8 (H) 6.0 - 8.4 g/dL    Albumin 5.0 3.5 - 5.2 g/dL    Total Bilirubin 0.9 0.1 - 1.0 mg/dL    Alkaline Phosphatase 377 (H) 38 - 126 U/L    AST 67 (H) 15 - 46 U/L    ALT 31 10 - 44 U/L    Anion Gap 21 (H) 8 - 16 mmol/L    eGFR >60.0 >60 mL/min/1.73 m^2   Lactic acid, plasma #1    Collection Time: 01/13/23  2:09 PM   Result Value Ref Range    Lactate (Lactic Acid) 5.2 (HH) 0.5 - 2.2 mmol/L   Troponin I    Collection Time: 01/13/23  2:18 PM   Result Value Ref Range    Troponin I <0.012 0.012 - 0.034 ng/mL   COVID-19 Rapid Screening    Collection Time: 01/13/23  2:19 PM   Result Value Ref Range    SARS-CoV-2 RNA, Amplification, Qual Negative Negative   ISTAT PROCEDURE    Collection Time: 01/13/23  3:36 PM   Result Value Ref Range     POC PH 7.282 (L) 7.35 - 7.45    POC PCO2 41.0 35 - 45 mmHg    POC PO2 37 (L) 40 - 60 mmHg    POC HCO3 19.4 (L) 24 - 28 mmol/L    POC BE -7 -2 to 2 mmol/L    POC SATURATED O2 63 (L) 95 - 100 %    POC TCO2 21 (L) 24 - 29 mmol/L    Sample VENOUS     Site Other     Allens Test N/A     DelSys Room Air    Beta - Hydroxybutyrate, Serum    Collection Time: 01/13/23  4:35 PM   Result Value Ref Range    Beta-Hydroxybutyrate 0.6 (H) 0.0 - 0.5 mmol/L   POCT glucose    Collection Time: 01/13/23  5:18 PM   Result Value Ref Range    POCT Glucose 212 (H) 70 - 110 mg/dL   Lactic acid, plasma #2    Collection Time: 01/13/23  5:58 PM   Result Value Ref Range    Lactate (Lactic Acid) 7.0 (HH) 0.5 - 2.2 mmol/L   POCT glucose    Collection Time: 01/13/23 10:48 PM   Result Value Ref Range    POCT Glucose 306 (H) 70 - 110 mg/dL   Lactic acid, plasma    Collection Time: 01/13/23 11:48 PM   Result Value Ref Range    Lactate (Lactic Acid) 6.8 (HH) 0.5 - 2.2 mmol/L   Comprehensive metabolic panel    Collection Time: 01/13/23 11:48 PM   Result Value Ref Range    Sodium 128 (L) 136 - 145 mmol/L    Potassium 4.4 3.5 - 5.1 mmol/L    Chloride 100 95 - 110 mmol/L    CO2 10 (L) 23 - 29 mmol/L    Glucose 328 (H) 70 - 110 mg/dL    BUN 58 (H) 8 - 23 mg/dL    Creatinine 1.7 (H) 0.5 - 1.4 mg/dL    Calcium 6.8 (LL) 8.7 - 10.5 mg/dL    Total Protein 5.5 (L) 6.0 - 8.4 g/dL    Albumin 2.6 (L) 3.5 - 5.2 g/dL    Total Bilirubin 0.7 0.1 - 1.0 mg/dL    Alkaline Phosphatase 264 (H) 55 - 135 U/L     (H) 10 - 40 U/L    ALT 83 (H) 10 - 44 U/L    Anion Gap 18 (H) 8 - 16 mmol/L    eGFR 32 (A) >60 mL/min/1.73 m^2   Lipase    Collection Time: 01/13/23 11:48 PM   Result Value Ref Range    Lipase 266 (H) 4 - 60 U/L   Magnesium    Collection Time: 01/13/23 11:48 PM   Result Value Ref Range    Magnesium 4.4 (H) 1.6 - 2.6 mg/dL   TSH    Collection Time: 01/13/23 11:48 PM   Result Value Ref Range    TSH 1.821 0.400 - 4.000 uIU/mL   Phosphorus    Collection Time:  01/13/23 11:48 PM   Result Value Ref Range    Phosphorus 4.5 2.7 - 4.5 mg/dL   Amylase    Collection Time: 01/13/23 11:48 PM   Result Value Ref Range    Amylase 1,884 (H) 20 - 110 U/L   POCT glucose    Collection Time: 01/14/23  1:55 AM   Result Value Ref Range    POCT Glucose 269 (H) 70 - 110 mg/dL   Basic metabolic panel    Collection Time: 01/14/23  2:06 AM   Result Value Ref Range    Sodium 130 (L) 136 - 145 mmol/L    Potassium 4.4 3.5 - 5.1 mmol/L    Chloride 101 95 - 110 mmol/L    CO2 12 (L) 23 - 29 mmol/L    Glucose 302 (H) 70 - 110 mg/dL    BUN 60 (H) 8 - 23 mg/dL    Creatinine 1.9 (H) 0.5 - 1.4 mg/dL    Calcium 6.9 (LL) 8.7 - 10.5 mg/dL    Anion Gap 17 (H) 8 - 16 mmol/L    eGFR 28 (A) >60 mL/min/1.73 m^2   Protime-INR    Collection Time: 01/14/23  2:06 AM   Result Value Ref Range    Prothrombin Time 16.4 (H) 9.0 - 12.5 sec    INR 1.6 (H) 0.8 - 1.2   ISTAT PROCEDURE    Collection Time: 01/14/23  2:43 AM   Result Value Ref Range    POC PH 7.215 (L) 7.35 - 7.45    POC PCO2 37.5 35 - 45 mmHg    POC PO2 33 (L) 40 - 60 mmHg    POC HCO3 15.2 (L) 24 - 28 mmol/L    POC BE -13 -2 to 2 mmol/L    POC SATURATED O2 52 (L) 95 - 100 %    POC TCO2 16 (L) 24 - 29 mmol/L    Sample VENOUS        EKG:   Results for orders placed or performed during the hospital encounter of 01/13/23   EKG 12-lead    Collection Time: 01/13/23  1:54 PM    Narrative    Test Reason : R53.1,    Vent. Rate : 221 BPM     Atrial Rate : 221 BPM     P-R Int : 136 ms          QRS Dur : 134 ms      QT Int : 130 ms       P-R-T Axes : 048 046 000 degrees     QTc Int : 249 ms    Sinus tachycardia with frequent Premature ventricular complexes in a  pattern of bigeminy  Nonspecific intraventricular block  Nonspecific T wave abnormality  Abnormal ECG  When compared with ECG of 07-DEC-2020 16:40,  Premature ventricular complexes are now Present  Vent. rate has increased  BPM  QRS duration has increased  Nonspecific T wave abnormality now evident in Inferior  leads  Nonspecific T wave abnormality now evident in Anterior-lateral leads  Confirmed by Grant HUBBARD MD, Walter RUIZ ([id number]) on 1/13/2023 4:43:20 PM    Referred By: LINDA GERMAN           Confirmed By:Walter Burns III, MD       ASSESSMENT/PLAN:         Pre-op Assessment    I have reviewed the Patient Summary Reports.     I have reviewed the Nursing Notes. I have reviewed the NPO Status.   I have reviewed the Medications.     Review of Systems      Physical Exam  General: Lethargic  Acutely ill   Airway:  Mallampati: III / II/ III  Mouth Opening: Small, but > 3cm  TM Distance: Normal  Tongue: Normal  Neck ROM: Normal ROM  NGT in place to suction   Chest/Lungs:  Normal Respiratory Rate    Heart:  Rhythm: Regular Rhythm    Abdomen:  Tenderness        Anesthesia Plan  Type of Anesthesia, risks & benefits discussed:    Anesthesia Type: Gen ETT  Intra-op Monitoring Plan: Standard ASA Monitors, Art Line and Central Line  Post Op Pain Control Plan: IV/PO Opioids PRN and multimodal analgesia  Induction:  IV and rapid sequence  Airway Plan: Video, Post-Induction  Informed Consent: Informed consent signed with the Patient and all parties understand the risks and agree with anesthesia plan.  All questions answered. Patient consented to blood products? Yes  ASA Score: 3 Emergent  Day of Surgery Review of History & Physical: H&P Update referred to the surgeon/provider.    Ready For Surgery From Anesthesia Perspective.     .

## 2023-01-14 NOTE — CONSULTS
[facility] Consult Note    Reason for Consult:     HINA, severe metabolic acidosis     Subjective:      History of Present Illness:  Lily Green is a 72 y.o. woman with a history of celiac disease, DM2 well controlled, HTN, anxiety presented to [facility] after having a fall. Noted to have severe lactic acidosis with imaging concerning for ischemic colitis. Became hypotensive and started on levophed and vasopressin for shock. On arrival to [place] taken to OR for colectomy. Today she has only made about 50ml urine for the day so far. She is surprisingly interactive and awake though intubated. Using paper and pen to communicate and nodding to questions.     Denies palpitations, chest pain, post op pain. Has tingling and burning in her feet (chronic neuropathy) and feels exhausted. Able to answer ROS with nodding and writing.    Past Medical History:  Past Medical History:   Diagnosis Date    Anxiety     Celiac disease 2018    Celiac disease     Depression     Diabetes mellitus     Family history of breast cancer in mother      at age 68    Hyperlipidemia     Hypertension     Meniere disease     Menopause     Peptic ulcer     Reflux esophagitis     Urinary tract infection     Vaginal infection     Vaginal Pap smear 2014    Pap/Hpv Negative        Past Surgical History:  Past Surgical History:   Procedure Laterality Date    APPENDECTOMY      BREAST SURGERY      Tags    CARPAL TUNNEL RELEASE Bilateral 2017    ,     COLONOSCOPY  2018    Normal  ([facility] Vel[facility])     DILATION AND CURETTAGE OF UTERUS  1986    DUPUYTREN CONTRACTURE RELEASE Bilateral 2017    HYSTERECTOMY  1987    BEAU w/ appy, no BSO     INJECTION OF NEUROLYTIC AGENT AROUND LUMBAR SYMPATHETIC NERVE N/A 2022    Procedure: BLOCK, LUMBAR SYMPATHETIC;  Surgeon: Souleymane Rizo Jr., MD;  [facility];  Service: Pain Management;  Laterality: N/A;  no pacemaker   pt is diabetic     INJECTION OF  NEUROLYTIC AGENT AROUND LUMBAR SYMPATHETIC NERVE N/A 8/25/2022    Procedure: BLOCK, LUMBAR SYMPATHETIC;  Surgeon: Souleymane Rizo Jr., MD;  Location: [facility];  Service: Pain Management;  Laterality: N/A;  diabetic     SHOULDER SURGERY  2009 & 2010    right rotator cuff    TONSILLECTOMY      UPPER GASTROINTESTINAL ENDOSCOPY  04/2018       Allergies:  Review of patient's allergies indicates:   Allergen Reactions    Crestor [rosuvastatin] Swelling    Gluten protein        Medications:   In-Hospital Scheduled Medications:   ceFEPime (MAXIPIME) IVPB  2 g Intravenous Q24H    chlorhexidine  15 mL Mouth/Throat BID    enoxaparin  30 mg Subcutaneous Daily    famotidine (PF)  20 mg Intravenous Daily    fludrocortisone  100 mcg Oral Daily    hydrocortisone sodium succinate  50 mg Intravenous Q6H    insulin detemir U-100  5 Units Subcutaneous QHS    metronidazole  500 mg Intravenous Q8H    mupirocin   Nasal BID    vancomycin  500 mg Per NG tube Q6H      In-Hospital PRN Medications:  sodium chloride, acetaminophen, dextrose 10%, glucagon (human recombinant), insulin aspart U-100, LIDOcaine, melatonin, sodium chloride 0.9%   In-Hospital IV Infusion Medications:   dexmedetomidine (PRECEDEX) infusion Stopped (01/14/23 1015)    NORepinephrine bitartrate-D5W 0.6 mcg/kg/min (01/14/23 1100)    NORepinephrine bitartrate-D5W      vasopressin Stopped (01/14/23 0835)      Home Medications:  Prior to Admission medications    Medication Sig Start Date End Date Taking? Authorizing Provider   alpha lipoic acid 600 mg Cap     Historical Provider   atorvastatin (LIPITOR) 40 MG tablet TAKE 1 TABLET BY MOUTH ONCE DAILY 12/5/22   Pavel Calixto MD   betahistine HCl (BETAHISTINE, BULK, MISC)  5/1/22   Historical Provider   blood sugar diagnostic (TRUE METRIX GLUCOSE TEST STRIP) Strp 1 strip by Misc.(Non-Drug; Combo Route) route 2 (two) times a day. 1/5/22   Pavel Calixto MD   blood-glucose meter kit Use to test twice a day 6/29/21  6/29/22  Pavel Calixto MD   calcium carbonate/vitamin D3 (CALTRATE WITH VITAMIN D3 ORAL) Take by mouth.    Historical Provider   cetirizine HCl (ZYRTEC ORAL)  4/1/22   Historical Provider   cinnamon bark (CINNAMON ORAL) Take by mouth.    Historical Provider   coenzyme Q10 100 mg capsule     Historical Provider   cyanocobalamin 1,000 mcg/mL injection Inject 1 mL (1,000 mcg total) into the muscle every 30 days. 11/3/22   Pavel Calixto MD   dicyclomine (BENTYL) 10 MG capsule  9/10/20   Historical Provider   DULoxetine (CYMBALTA) 30 MG capsule Take 1 capsule (30 mg total) by mouth 2 (two) times daily. 9/8/22 9/8/23  TRINIDAD Gibson   econazole nitrate 1 % cream Apply topically 2 (two) times daily. 2/7/22   Lucas Bryan DPM   finasteride (PROSCAR) 5 mg tablet  10/3/20   Historical Provider   flaxseed oil 1,000 mg Cap once a day 3/11/13   Historical Provider   gluc-condr-om3-dha-epa-fish-st 091-138-80-54 mg Cap once a day 3/11/13   Historical Provider   glucosamine HCl 1,500 mg Tab Take 1,500 mg by mouth. 11/30/12   Historical Provider   KRILL OIL ORAL Take by mouth.    Historical Provider   lancets Misc 1 lancet by Misc.(Non-Drug; Combo Route) route once daily. 2/4/22   Pavel Calixto MD   meclizine (ANTIVERT) 25 mg tablet Take 1 tablet (25 mg total) by mouth 3 (three) times daily as needed for Dizziness. 12/7/20   Malick Vo MD   metFORMIN (GLUCOPHAGE) 500 MG tablet Take 1 tablet (500 mg total) by mouth 3 (three) times daily. 2 po qam, 1 po qhs.  Patient taking differently: Take 500 mg by mouth 2 (two) times daily. 1 po qam, 1 po qhs. 3/7/22   Pavel Calixto MD   MILK THISTLE ORAL Take by mouth.    Historical Provider   pantoprazole (PROTONIX) 40 MG tablet TAKE 1 TABLET BY MOUTH ONCE DAILY  6/23/21   Pavel Calixto MD   pregabalin (LYRICA) 150 MG capsule Take 1 capsule (150 mg total) by mouth 2 (two) times daily. 1/5/23 2/4/23  Edgard Dozier MD   temazepam (RESTORIL) 30 mg capsule TAKE  "ONE CAPSULE BY MOUTH NIGHTLY AS NEEDED FOR INSOMNIA 22   Pavel Calixto MD   triamcinolone acetonide 0.1% (KENALOG) 0.1 % ointment Apply topically 2 (two) times daily as needed (rash). 21   Pavel Calixto MD   triamterene-hydrochlorothiazide 37.5-25 mg (MAXZIDE-25) 37.5-25 mg per tablet  10/6/21   Historical Provider   vit C,C-Gt-oalsm-lutein-zeaxan (PRESERVISION AREDS 2) 456-995-11-1 mg-unit-mg-mg Cap     Historical Provider   vitamins  A,C,E-zinc-copper 14,320-226-200 unit-mg-unit Cap Take 1 tablet by mouth once daily.    Historical Provider       Family History:  Family History   Problem Relation Age of Onset    Vision loss Father     Arthritis Father     Breast cancer Mother     Cancer Mother     Vision loss Mother     Hyperlipidemia Sister     Breast cancer Paternal Aunt     Cancer Paternal Aunt     Diabetes Paternal Grandfather     Vision loss Sister     Kidney disease Neg Hx        Social History:  Social History     Tobacco Use    Smoking status: Never    Smokeless tobacco: Never   Substance Use Topics    Alcohol use: No    Drug use: Never       Review of Systems:  Pertinent items are noted in HPI. All other systems are reviewed and are negative.       Objective:   Last 24 Hour Vital Signs:  BP  Min: 71/46  Max: 143/67  Temp  Av.9 °F (37.2 °C)  Min: 98.1 °F (36.7 °C)  Max: 100.1 °F (37.8 °C)  Pulse  Av.5  Min: 84  Max: 109  Resp  Av.7  Min: 16  Max: 41  SpO2  Av %  Min: 82 %  Max: 100 %  Height  Av' 5" (165.1 cm)  Min: 5' 5" (165.1 cm)  Max: 5' 5" (165.1 cm)  Weight  Av.2 kg (132 lb 9.9 oz)  Min: 59 kg (130 lb)  Max: 61 kg (134 lb 7.7 oz)  I/O last 3 completed shifts:  In: 2878.1 [I.V.:733.4; Blood:250; NG/GT:45; IV Piggyback:1849.8]  Out: 350 [Urine:50; Stool:300]    Physical Examination:  Gen: awake, alert, intubated   HEENT: normocephalic, atraumatic, PERRL, EOMI, MMM  CV: RRR, no murmurs appreciated, no extra heart sounds  Lungs: CTAB, symmetric chest rise, " no wheezing or crackles  GI: Soft, non tender, post op site not evaluated due to new clean dressing that should not be disturbed at this time  Extrem: no edema, clubbing, or cyanosis  Skin: dry, warm, intact. No bruising or rashes.  Neuro: AAO, using pen and paper to write and also nodding yes and no to communicate        Laboratory Results:  Most Recent Data:  CBC:   Lab Results   Component Value Date    WBC 13.28 (H) 01/14/2023    HGB 12.2 01/14/2023    HCT 35.8 (L) 01/14/2023     01/14/2023    MCV 92 01/14/2023    RDW 12.5 01/14/2023     BMP:   Lab Results   Component Value Date     (L) 01/14/2023    K 5.1 01/14/2023     01/14/2023    CO2 14 (L) 01/14/2023    BUN 60 (H) 01/14/2023     (H) 01/14/2023    CALCIUM 7.9 (L) 01/14/2023    MG 3.7 (H) 01/14/2023    PHOS 6.1 (H) 01/14/2023     LFTs:   Lab Results   Component Value Date    PROT 5.4 (L) 01/14/2023    ALBUMIN 2.7 (L) 01/14/2023    BILITOT 0.7 01/14/2023     (H) 01/14/2023    ALKPHOS 157 (H) 01/14/2023    ALT 92 (H) 01/14/2023     Cardiac:   Lab Results   Component Value Date    TROPONINI <0.012 01/13/2023     Urinalysis:   Lab Results   Component Value Date    LABURIN No growth 10/11/2021    COLORU Yellow 10/11/2021    CLARITYU Clear 12/23/2020    SPECGRAV 1.020 10/11/2021    NITRITE Negative 10/11/2021    KETONESU Negative 10/11/2021    UROBILINOGEN Negative 10/11/2021       Trended Lab Data:  Recent Labs   Lab 01/13/23  1409 01/13/23  2348 01/14/23  0206 01/14/23  0317 01/14/23  0844   WBC 26.16*  --   --  10.41 13.28*   HGB 17.2*  --   --  15.5 12.2   HCT 50.7*  --   --  47.1 35.8*     --   --  336 248   MCV 92  --   --  94 92   RDW 12.3  --   --  12.5 12.5   * 128* 130* 131* 130*   K 4.1 4.4 4.4 4.5 5.1   CL 96 100 101 98 102   CO2 18* 10* 12* 12* 14*   BUN 46* 58* 60* 58* 60*   * 328* 302* 291* 289*   PROT 9.8* 5.5*  --  5.7* 5.4*   ALBUMIN 5.0 2.6*  --  2.4* 2.7*   BILITOT 0.9 0.7  --  0.6 0.7   AST  67* 369*  --  389* 294*   ALKPHOS 377* 264*  --  245* 157*   ALT 31 83*  --  96* 92*         Other Results:  Radiology:  CT Head Without Contrast    Result Date: 1/13/2023  EXAMINATION: CT HEAD WITHOUT CONTRAST CLINICAL HISTORY: Subarachnoid hemorrhage (SAH) suspected;recent Fall at home, unwitnessed; TECHNIQUE: Low dose axial CT images obtained throughout the head without intravenous contrast. Sagittal and coronal reconstructions were performed. COMPARISON: None available. FINDINGS: There is age-related brain parenchymal volume loss.  There is no hydrocephalus.  There is hypoattenuation the supratentorial white matter which can be seen with mild microvascular ischemic changes.  No extra-axial blood or fluid collections.  No parenchymal mass, hemorrhage, edema or major vascular distribution infarct. No calvarial fracture.  The scalp is unremarkable.  Bilateral paranasal sinuses and mastoid air cells are clear.     No acute intracranial abnormality. Electronically signed by: Wyatt Martinez Date:    01/13/2023 Time:    23:30    CT Abdomen Pelvis With Contrast    Result Date: 1/13/2023  EXAMINATION: CT ABDOMEN PELVIS WITH CONTRAST CLINICAL HISTORY: Abdominal abscess/infection suspected; TECHNIQUE: Low dose axial images, sagittal and coronal reformations were obtained from the lung bases to the pubic symphysis following the IV administration of 75 mL of Omnipaque 350 .  Oral contrast was not given. COMPARISON: None. FINDINGS: The lung bases are free of focal consolidations.  No pleural effusions are identified.  Bony structures appear intact.  There is no evidence for acute fracture or bone destruction.  There are degenerative changes within the lumbar spine.  There is grade 1 anterolisthesis of L4 on L5. The liver is normal in size and is homogeneous in density with no focal liver lesions identified.  The gallbladder is distended but otherwise appears unremarkable.  There is no evidence for intrahepatic or extrahepatic  biliary dilatation.  The portal venous system is patent.  The spleen and pancreas both appear grossly unremarkable.  There is a small hiatal hernia.  The stomach otherwise appears grossly unremarkable.  The adrenal glands are not enlarged.  There is a subcentimeter hypodensity within the upper pole of the left kidney likely representing a small cyst but too small to adequately characterize.  There is a 9 mm nonobstructing calculus within the upper pole of the left kidney.  There is no evidence for hydronephrosis.  There is atherosclerotic calcification present within the abdominal aorta which tapers normally without aneurysmal dilatation.  No para-aortic lymphadenopathy is identified.  The appendix is not confidently identified, however there are no CT findings to suggest appendicitis.  There is distension of the colon to the rectum with a large amount of stool and fluid throughout the colon and rectum.  There is a segment of colon at the junction of the descending colon and sigmoid colon that appears relatively narrow with probable circumferential wall thickening (image 136, series 2, image 84, series 601, image 99, series 602).  A colonic mass at this location should be strongly considered.  GI consultation is recommended.  The uterus is not visualized and is likely absent.  No abnormal adnexal masses are identified.  The urinary bladder is unremarkable.  There is no evidence for pelvic or inguinal lymphadenopathy.  There is a tiny umbilical hernia present.  No ascites is identified.     Prominent amount of stool throughout the rectum and sigmoid colon.  Fluid and stool distending the majority of the remainder of the colon with questionable segment of more normal caliber colon at the junction of the descending colon and sigmoid colon.  GI consultation is recommended. 9 mm nonobstructing calculus within the upper pole of the left kidney. Small hiatal hernia. Subcentimeter left upper pole renal cyst. Distended  gallbladder which otherwise appears unremarkable. Electronically signed by: Derick Stahl MD Date:    01/13/2023 Time:    15:44    X-Ray Chest AP Portable    Result Date: 1/14/2023  EXAMINATION: XR CHEST AP PORTABLE CLINICAL HISTORY: ET tube placement; TECHNIQUE: Single frontal view of the chest was performed. COMPARISON: Chest radiograph performed 01/13/2023, 20:45 hours. FINDINGS: Tip of endotracheal tube projects approximately 41 mm above level yo.  Tip and side port of enteric tube projects subdiaphragmatic in location.  Grossly unchanged position left internal jugular approach central venous catheter.  Monitoring leads are noted. Grossly unchanged cardiomediastinal contours.  Lungs appear grossly clear.  No definite pneumothorax or large volume pleural effusion. Anticipated postoperative pneumoperitoneum noted. Remainder examination not substantially changed relative to 01/13/2023, 20:45 hours examination.     As above. Electronically signed by: Silver Funes Date:    01/14/2023 Time:    09:19    X-Ray Chest AP Portable    Result Date: 1/13/2023  EXAMINATION: XR CHEST AP PORTABLE CLINICAL HISTORY: Encounter for adjustment and management of vascular access device TECHNIQUE: Single frontal view of the chest was performed. COMPARISON: 01/13/2023. FINDINGS: There is a left internal jugular central venous catheter terminating high SVC.  The lungs are well expanded and clear.  The pleural spaces are clear.  No focal opacities are seen.  The cardiac silhouette is unremarkable.  There are calcifications of the aortic arch.  The visualized osseous structures demonstrate degenerative changes.  Right humeral head anchor screw.     Central venous catheter as above. Electronically signed by: Wyatt Martinez Date:    01/13/2023 Time:    21:11    X-Ray Chest AP Portable    Result Date: 1/13/2023  EXAMINATION: XR CHEST AP PORTABLE CLINICAL HISTORY: Sepsis; TECHNIQUE: Single frontal view of the chest was performed.  COMPARISON: None FINDINGS: The heart is not enlarged.  Atherosclerotic calcification is present within the aortic arch.  Pulmonary vasculature is within normal limits.  The lungs are well aerated and free of focal consolidations.  There is no evidence for pneumothorax or large pleural effusions.  There are prominent degenerative changes of the right shoulder.  A bone anchor is identified within the proximal right humerus.     No acute chest disease identified. Electronically signed by: Derick Stahl MD Date:    01/13/2023 Time:    14:23    DXA Bone Density Spine And Hip    Result Date: 1/3/2023  EXAMINATION: DEXA BONE DENSITY SPINE HIP CLINICAL HISTORY: Asymptomatic menopausal state TECHNIQUE: DXA scanning was performed over the left hip and lumbar spine.  Review of the images confirms satisfactory positioning and technique. FINDINGS: The L1 to L4 vertebral bone mineral density is equal to 1.15 g/cm squared with a T score of -0.3. The left femoral neck bone mineral density is equal to 1.19 g/cm squared with a T score of 1.1. There is a 7.2% risk of a major osteoporotic fracture and a 0.6% risk of hip fracture in the next 10 years (FRAX).     Lumbar spine normal bone density. Left femoral neck normal bone density. Electronically signed by: Derick Engle MD Date:    01/03/2023 Time:    13:11    XR NG/OG tube placement check, non-radiologist performed    Result Date: 1/14/2023  EXAMINATION: XR NG/OG TUBE PLACEMENT CHECK, NON-RADIOLOGIST PERFORMED CLINICAL HISTORY: ngt placement; TECHNIQUE: AP radiograph the chest. COMPARISON: None FINDINGS: There is nasogastric tube with the tip and side-port in the stomach.  There is a left internal jugular central venous catheter terminating in the high SVC.  There are overlying leads.  The lungs are well expanded.  There are chronic appearing interstitial opacities.  No large focal consolidation.  The cardiac silhouette is unremarkable.  The pleural spaces are clear.  There are  calcifications of the aortic arch.  Visualized osseous structures demonstrate degenerative changes.  There is an anchor screw overlying the right proximal humerus.     Nasogastric tube with the tip and side-port in the stomach as above. Electronically signed by: Wyatt Martinez Date:    01/14/2023 Time:    01:28      Imaging has been reviewed personally.      Assessment and Plan:     Lily Green is a 72 y.o. woman with a history of celiac disease, DM2 well controlled, HTN, anxiety presented to [facility] after having a fall. Noted to have severe lactic acidosis with imaging concerning for ischemic colitis. Became hypotensive and started on levophed and vasopressin for shock. On arrival to [place] taken to OR for colectomy. Today she has only made about 50ml urine for the day so far. [facility] consulted for severe metabolic acidosis with pH 7.14 and HINA in setting of septic shock and ischemic colitis. Since surgery has weaned off vasopressin but still requiring levophed.     Stage III Oliguric HINA  - baseline creatinine 0.8  - presenting in shock on pressors with ischemic colitis  - s/p colectomy 1/14, expect acidosis to start resolving and HINA to start to improve  - however she has only made about 50ml urine so far today  - repeat ABG and CMP pending and will determine if she needs emergent dialysis today  - renally dose all medications     -Addendum: lactate 3.2, bicarb 13. Remains acidotic and on pressors because of this. UOP still minimal. Will initiate HD today.       Anion Gap Metabolic Acidosis  - initial bicarb 12; lactate 7; pH 7.14; gap 21  - due to ischemic colitis, now s/p colectomy.   - anticipate improvement since colectomy.   - Repeat labs pending     Hyponatremia  - appears euvolemic on exam; s/p 3L input and minimal output  - Na 130, monitor. If continues to decrease will obtain urine sodium and urine osm     Hyperphosphatemia  - phos 6.1, related to HINA. No intervention at this  time    Hypocalcemia  - Ca 7.9, albumin 2.7. Corrected calcium 8.9          Thank you for allowing us to participate in the care of this patient. Please contact me if you have any questions regarding this consult.    Elizabeth Mobley DO  [facility]

## 2023-01-14 NOTE — PROVIDER TRANSFER
Outside Transfer Acceptance Note / Regional Referral Center    Referring facility: [facility]   Referring provider: LINDA GERMAN  Accepting facility: [facility]  Accepting provider: MADHAV TURNER  Reason for transfer: Higher Level of Care   Transfer diagnosis: vomiting  Transfer specialty requested: Gastroenterology  Transfer specialty notified: yes  Transfer level: NUMBER 1-5: 1  Bed type requested: ICU  Isolation status: No active isolations   Admission class or status: IP- Inpatient      Narrative     72-year-old woman with pertinent PMH DM2 (metformin), HTN, and celiac disease (on strict diet) presented to [facility] after a mechanical fall getting out of bed and hitting her face.  Patient reports some N/ V/D, abd pain and weakness starting yesterday and 60 Lbs weight loss over a longer period of time which may in part be due to diet.  ROS otherwise unremarkable.  Patient meds include Lyrica which has recently been increased.    VS on presentation BP 85/52, HR 91, RR 23, SpO2 95% (RA).  PEx was pos for a laceration to the upper lip.  She appeared drowsy but was oriented.  GCS 15.    Labs:  WBC 26.1, Hb 17.2, Plts 366, Na 135, K 4.1, CO2 18, AG 21, BUN 46, Cr 0.88, Glu 269, troponin < 0.012, LA 5.2, BHB 0.6, COVID neg.  U/A and UC pending.     CT abd pelvis with contrast notable for distention of the colon to the rectum with a large amount of stool and fluid throughout the colon and rectum.  There is a segment of colon at the junction of the descending colon and sigmoid colon that appears relatively narrow with probable circumferential wall thickening.  A colonic mass at this location should be strongly considered.  Also, a prominent amount of stool throughout the rectum and sigmoid colon.  Fluid and stool distending the majority of the remainder of the colon.  The small bowel is not distended.    Patient given IVF (3 L) and started on Zosyn.  BC x2 obtained.  U/A and UC ordered  (patient not urinating).  Repeat VS (1705 h) BP 89/49, HR 90, RR 23.  Patient disempacted which resulted in small to moderate amount of stool.  There is no hematochezia.      Transfer diagnosis:  Colonic lesion of uncertain significance with concern for malignancy, metabolic acidosis, hypotension, dehydration and facial laceration.  Elevated WBC, and lactic acidosis which is not otherwise explained is concerning for sepsis     Transfer requested for GI and GS evaluation. Transfer reviewed by [facility] (Dr. Jhonatan Mcguire) who agreed with the transfer and will evaluate the patient and recommended surgical evaluation on arrival.      Instructions      [facility]  Admit to Hospital Medicine  Upon patient arrival to floor, please contact Hospital Medicine on call.

## 2023-01-14 NOTE — CONSULTS
[facility] Medicine Note    Primary Attending Physician: Tim  Consultant Attending: Blair  Consultant Fellow: Kim    Reason for Consult:     Shock and renal failure    Subjective:      History of Present Illness:  Lily Green is a 72 y.o. female who  has a past medical history of Anxiety, Celiac disease (2018), Celiac disease, Depression, Diabetes mellitus, Family history of breast cancer in mother, Hyperlipidemia, Hypertension, Meniere disease, Menopause, Peptic ulcer, Reflux esophagitis, Urinary tract infection, Vaginal infection, and Vaginal Pap smear (2014).. The patient presented to the [facility] on 2023 with a primary complaint of Fall x1.     Patient states that she had been feeling more fatigued and ill the past 3-5 days. She states it has been a slew of symptoms: nausea, vomiting, diarrhea, abdominal pain, fatigue. She states the morning of her presentation she had noted worsening fatigue and weakness, stood up and ended up falling, hitting her face. Last bowel movement >1 day ago (received a suppository at OSH with liquid stool in the flexiseal), hadn't passed gas since this morning. Decreased urine output over the past 24 hours as well.    Of note, she's noticed 60# weight loss over 6 months, if not more. She attributed it to a change in diet. CT imaging found to have colonic dilation with a concern for an obstruction.    Past Medical History:  Past Medical History:   Diagnosis Date    Anxiety     Celiac disease 2018    Celiac disease     Depression     Diabetes mellitus     Family history of breast cancer in mother      at age 68    Hyperlipidemia     Hypertension     Meniere disease     Menopause     Peptic ulcer     Reflux esophagitis     Urinary tract infection     Vaginal infection     Vaginal Pap smear 2014    Pap/Hpv Negative        Past Surgical History:  Past Surgical History:   Procedure Laterality Date    APPENDECTOMY       BREAST SURGERY      Tags    CARPAL TUNNEL RELEASE Bilateral 09/2017    ,     COLONOSCOPY  04/2018    Normal  ([place] [facility])     DILATION AND CURETTAGE OF UTERUS  1986    DUPUYTREN CONTRACTURE RELEASE Bilateral 09/2017    HYSTERECTOMY  1987    BEAU w/ appy, no BSO     INJECTION OF NEUROLYTIC AGENT AROUND LUMBAR SYMPATHETIC NERVE N/A 1/6/2022    Procedure: BLOCK, LUMBAR SYMPATHETIC;  Surgeon: Souleymane Rizo Jr., MD;  Location: [facility];  Service: Pain Management;  Laterality: N/A;  no pacemaker   pt is diabetic     INJECTION OF NEUROLYTIC AGENT AROUND LUMBAR SYMPATHETIC NERVE N/A 8/25/2022    Procedure: BLOCK, LUMBAR SYMPATHETIC;  Surgeon: Souleymane Rizo Jr., MD;  Location: [facility];  Service: Pain Management;  Laterality: N/A;  diabetic     SHOULDER SURGERY  2009 & 2010    right rotator cuff    TONSILLECTOMY      UPPER GASTROINTESTINAL ENDOSCOPY  04/2018       Allergies:  Review of patient's allergies indicates:   Allergen Reactions    Crestor [rosuvastatin] Swelling    Gluten protein        Medications:   In-Hospital Scheduled Medications:   ceFEPime (MAXIPIME) IVPB  2 g Intravenous Q24H    enoxaparin  40 mg Subcutaneous Daily    fludrocortisone  100 mcg Oral Daily    hydrocortisone sodium succinate  50 mg Intravenous Q6H    insulin detemir U-100  5 Units Subcutaneous QHS    lactated ringers  1,000 mL Intravenous Once    metronidazole  500 mg Intravenous Q8H    mupirocin   Nasal BID    vancomycin (VANCOCIN) IVPB  20 mg/kg (Dosing Weight) Intravenous Once      In-Hospital PRN Medications:  acetaminophen, dextrose 10%, glucagon (human recombinant), insulin aspart U-100, LIDOcaine, melatonin, sodium chloride 0.9%, Pharmacy to dose Vancomycin consult **AND** vancomycin - pharmacy to dose   In-Hospital IV Infusion Medications:   NORepinephrine bitartrate-D5W 0.48 mcg/kg/min (01/13/23 2302)    vasopressin 0.04 Units/min (01/14/23 0013)      Home Medications:  Prior to Admission medications     Medication Sig Start Date End Date Taking? Authorizing Provider   alpha lipoic acid 600 mg Cap     Historical Provider   atorvastatin (LIPITOR) 40 MG tablet TAKE 1 TABLET BY MOUTH ONCE DAILY 12/5/22   Pavel Calixto MD   betahistine HCl (BETAHISTINE, BULK, MISC)  5/1/22   Historical Provider   blood sugar diagnostic (TRUE METRIX GLUCOSE TEST STRIP) Strp 1 strip by Misc.(Non-Drug; Combo Route) route 2 (two) times a day. 1/5/22   Pavel Calixto MD   blood-glucose meter kit Use to test twice a day 6/29/21 6/29/22  Pavel Calixto MD   calcium carbonate/vitamin D3 (CALTRATE WITH VITAMIN D3 ORAL) Take by mouth.    Historical Provider   cetirizine HCl (ZYRTEC ORAL)  4/1/22   Historical Provider   cinnamon bark (CINNAMON ORAL) Take by mouth.    Historical Provider   coenzyme Q10 100 mg capsule     Historical Provider   cyanocobalamin 1,000 mcg/mL injection Inject 1 mL (1,000 mcg total) into the muscle every 30 days. 11/3/22   Pavel Calixto MD   dicyclomine (BENTYL) 10 MG capsule  9/10/20   Historical Provider   DULoxetine (CYMBALTA) 30 MG capsule Take 1 capsule (30 mg total) by mouth 2 (two) times daily. 9/8/22 9/8/23  TRINIDAD Gibson   econazole nitrate 1 % cream Apply topically 2 (two) times daily. 2/7/22   Lucas Bryan DPM   finasteride (PROSCAR) 5 mg tablet  10/3/20   Historical Provider   flaxseed oil 1,000 mg Cap once a day 3/11/13   Historical Provider   gluc-condr-om3-dha-epa-fish-st [phone] mg Cap once a day 3/11/13   Historical Provider   glucosamine HCl 1,500 mg Tab Take 1,500 mg by mouth. 11/30/12   Historical Provider   KRILL OIL ORAL Take by mouth.    Historical Provider   lancets Misc 1 lancet by Misc.(Non-Drug; Combo Route) route once daily. 2/4/22   Pavel Calixto MD   meclizine (ANTIVERT) 25 mg tablet Take 1 tablet (25 mg total) by mouth 3 (three) times daily as needed for Dizziness. 12/7/20   Malick Vo MD   metFORMIN (GLUCOPHAGE) 500 MG tablet Take 1 tablet (500 mg  total) by mouth 3 (three) times daily. 2 po qam, 1 po qhs.  Patient taking differently: Take 500 mg by mouth 2 (two) times daily. 1 po qam, 1 po qhs. 3/7/22   Pavel Calixto MD   MILK THISTLE ORAL Take by mouth.    Historical Provider   pantoprazole (PROTONIX) 40 MG tablet TAKE 1 TABLET BY MOUTH ONCE DAILY  21   Pavel Calixto MD   pregabalin (LYRICA) 150 MG capsule Take 1 capsule (150 mg total) by mouth 2 (two) times daily. 23  Edgard Dozier MD   temazepam (RESTORIL) 30 mg capsule TAKE ONE CAPSULE BY MOUTH NIGHTLY AS NEEDED FOR INSOMNIA 22   Pavel Calixto MD   triamcinolone acetonide 0.1% (KENALOG) 0.1 % ointment Apply topically 2 (two) times daily as needed (rash). 21   Pavel Calixto MD   triamterene-hydrochlorothiazide 37.5-25 mg (MAXZIDE-25) 37.5-25 mg per tablet  10/6/21   Historical Provider   vit C,O-Jn-uybru-lutein-zeaxan (PRESERVISION AREDS 2) [phone] mg-unit-mg-mg Cap     Historical Provider   vitamins  A,C,E-zinc-copper [phone] unit-mg-unit Cap Take 1 tablet by mouth once daily.    Historical Provider       Family History:  Family History   Problem Relation Age of Onset    Vision loss Father     Arthritis Father     Breast cancer Mother     Cancer Mother     Vision loss Mother     Hyperlipidemia Sister     Breast cancer Paternal Aunt     Cancer Paternal Aunt     Diabetes Paternal Grandfather     Vision loss Sister     Kidney disease Neg Hx        Social History:  Social History     Tobacco Use    Smoking status: Never    Smokeless tobacco: Never   Substance Use Topics    Alcohol use: No    Drug use: Never       Review of Systems:  Pertinent items are noted in HPI. All other systems are reviewed and are negative.     Objective:   Last 24 Hour Vital Signs:  BP  Min: 71/46  Max: 139/66  Temp  Av.1 °F (37.3 °C)  Min: 98.1 °F (36.7 °C)  Max: 100.1 °F (37.8 °C)  Pulse  Av.8  Min: 87  Max: 109  Resp  Av.7  Min: 16  Max: 36  SpO2  Av.7  "%  Min: 82 %  Max: 100 %  Height  Av' 5" (165.1 cm)  Min: 5' 5" (165.1 cm)  Max: 5' 5" (165.1 cm)  Weight  Av kg (132 lb 3.8 oz)  Min: 59 kg (130 lb)  Max: 61 kg (134 lb 7.7 oz)  No intake/output data recorded.    Physical Examination:  Gen: AOx3, fatigued appearing  HEENT: NCAT, PERRL, EOMI, MMM, JVP ~5cm, no thyromegaly, lymphadenopathy appreciated  CV: RRR, S1/S2 appreciated, no murmur gallop or rubs  Resp: CTA B/L, no increased WOB, no crackles, rhonchi appreciated  Abdomen: Soft, ND, diffusely tender with guarding, no bowel sounds  Ext: 2+ distal pulses, no edema appreciated  Skin: Warm, dry, no rashes appreciated  Psych: appropriate mood, affect  Neuro: AAOx4, following commands appropriately, moving all extremities spontaneously    Laboratory:  Trended Lab Data:  Recent Labs     23  1409 23  2348 23  0206   WBC 26.16*  --   --    HGB 17.2*  --   --    HCT 50.7*  --   --      --   --    * 128*  --    K 4.1 4.4  --    CL 96 100  --    CO2 18* 10*  --    BUN 46* 58*  --    CREATININE 0.88 1.7*  --    * 328*  --    BILITOT 0.9 0.7  --    AST 67* 369*  --    ALT 31 83*  --    ALKPHOS 377* 264*  --    CALCIUM 8.8 6.8*  --    ALBUMIN 5.0 2.6*  --    PROT 9.8* 5.5*  --    MG  --  4.4*  --    PHOS  --  4.5  --    INR  --   --  1.6*       Cardiac:   Recent Labs   Lab 23  1418   TROPONINI <0.012       Urinalysis:   Lab Results   Component Value Date    LABURIN No growth 10/11/2021    COLORU Yellow 10/11/2021    CLARITYU Clear 2020    SPECGRAV 1.020 10/11/2021    NITRITE Negative 10/11/2021    KETONESU Negative 10/11/2021    UROBILINOGEN Negative 10/11/2021       Microbiology:  Microbiology Results (last 7 days)       Procedure Component Value Units Date/Time    Clostridium difficile EIA [[id number]]     Order Status: No result Specimen: Stool             Radiology:  2023 CT Abdomen Pelvis With Contrast  Prominent amount of stool throughout the rectum and " sigmoid colon.  Fluid and stool distending the majority of the remainder of the colon with questionable segment of more normal caliber colon at the junction of the descending colon and sigmoid colon.  GI consultation is recommended.     9 mm nonobstructing calculus within the upper pole of the left kidney.     Small hiatal hernia.     Subcentimeter left upper pole renal cyst.     Distended gallbladder which otherwise appears unremarkable.       I have personally reviewed the above labs and imaging.    Current Medications:     Infusions:   NORepinephrine bitartrate-D5W 0.48 mcg/kg/min (01/13/23 2302)    vasopressin 0.04 Units/min (01/14/23 0013)        Scheduled:   ceFEPime (MAXIPIME) IVPB  2 g Intravenous Q24H    enoxaparin  40 mg Subcutaneous Daily    fludrocortisone  100 mcg Oral Daily    hydrocortisone sodium succinate  50 mg Intravenous Q6H    insulin detemir U-100  5 Units Subcutaneous QHS    lactated ringers  1,000 mL Intravenous Once    metronidazole  500 mg Intravenous Q8H    mupirocin   Nasal BID    vancomycin (VANCOCIN) IVPB  20 mg/kg (Dosing Weight) Intravenous Once        PRN:  acetaminophen, dextrose 10%, glucagon (human recombinant), insulin aspart U-100, LIDOcaine, melatonin, sodium chloride 0.9%, Pharmacy to dose Vancomycin consult **AND** vancomycin - pharmacy to dose     Assessment:     Lily Green is a 72 y.o. female with Celiac's Disease, GERD, DDD, HTN, MARIXA/MDD who presented after a fall after 3-5 days of fatigue, weakness, and lethargy. Found to have dilated colon; concerning for obstruction. Not passing gas and no bowel movement in the past 24 hours. Found to be in shock, now on 2 pressors.     Plan:     Neuro/Psych:  Acute Encephalopathy  - mentation remains appropriate on exam today; however  - ABG:     Degenerative Disc Disease  - hold lyrica    Cardiovascular/Hemodynamics:  Shock  - suspected 2/2 septic  - bedside US with no evidence of right heart strain; normal appearing  EF  - Follow-up cultures, c. diff  - Check formal ECHO  - Continue vancomycin, cefepime, flagyl  - Continue levophed, vasopressin, fludro/hydro  - MAP Goal >60    Respiratory:   Acute Hypoxemic Respiratory Failure  - Currently on 2LNC  - Appears renal function is worsening; trial of lasix  - try to maintain net negative fluid balance daily  - Wean SpO2, goal 88-95%    GI/FEN:  F: +4.5L  E: K>4, Mg>2  N: NPO for procedure    Acute Pancreatitis  Concerns for Acute Abdomen from Colonic Dilatation/Obstruction  - Amylase/Lipase elevated; diffusely tender abdomen with guarding. Hasn't had a bowel movement or passed gas in ~24 hours. Given suppository in the ED at OSH  - CT imaging as above  - antibiotics; pressors  - Surgery consulted, ex lap this morning    Transaminitis  - suspected 2/2 shock + congestion + acute illness  - monitor    ID:   Leukocytosis  - WBC 26.16 on admit  - continue broad spectrum antibiotics; follow-up cultures, c diff    Renal:   ATN/HINA  Lactic Acidosis  Metabolic Acidosis  - Cr baseline 0.4 - 0.6  s/p bradford placement, no evidence of obstruction  - no UOP since she's been here  - trial of lasix  - concerning with her lactic acidosis + worsening renal function, may ultimately need dialysis  - renally dose all medication, avoid nephrotoxic agents  - strict I/Os; daily weight   - nephro consult    Heme/Onc:   Elevated INR  - 1 unit of FFP in preparation for surgery    Endocrine:  T2DM  - Glucose goal 140-180  - Levemir 5 units  - SSI + Accucheck    TSH Normal     Critical Care Daily Checklist:    A: Awake: RASS Goal/Actual Goal: 0  Actual: Cavazos Agitation Sedation Scale (RASS): 0   B: Spontaneous Breathing Trial Performed? N/A   C: SAT & SBT Coordinated?  N/A                      D: Delirium: CAM-ICU Overall CAM-ICU: Negative   E: Early Mobility Performed? No   F: Feeding Goal: NPO  Status: NPO     AS: Analgesia/Sedation None   T: Thromboembolic Prophylaxis SCDs; holding chemical dvt ppx for  surgery   H: HOB > 300 Yes   U: Stress Ulcer Prophylaxis (if needed) None   G: Glucose Control At goal   B: Bowel Function Stool Occurrence: 1 ; Regimen: suppository x1   I: Indwelling Catheter (Lines & June) Necessity LIJ TLC, June   D: De-escalation of Antimicrobials/Pharmacotherapies Continue for now    Plan for the day/ETD Continue ICU care; ex lap with surgery this morning. Continue broad spectrum antibiotics, wean pressors as able.    Code Status:  Family/Goals of Care: Full  Ongoing     Scooby V MD Angela  [employer] Pul[facility] & Critical Care Medicine Fellow

## 2023-01-14 NOTE — PROCEDURES
"Lily Green is a 72 y.o. female patient.    Temp: 98.6 °F (37 °C) (01/14/23 0815)  Pulse: 91 (01/14/23 1400)  Resp: (!) 25 (01/14/23 1408)  BP: (!) 82/52 (01/14/23 1400)  SpO2: 100 % (01/14/23 1400)  Weight: 60.8 kg (134 lb) (01/14/23 1302)  Height: 5' 5" (165.1 cm) (01/14/23 1302)       Central Line    Date/Time: 1/14/2023 2:35 PM  Performed by: Donna Esparza MD  Authorized by: Donna Esparza MD     Location procedure was performed:  [facility] ICU  Pre-operative diagnosis:  Acidosis  Post-operative diagnosis:  Acidosis  Consent Done ?:  Yes  Time out complete?: Verified correct patient, procedure, equipment, staff, and site/side    Indications:  Hemodialysis  Anesthesia:  Local infiltration  Local anesthetic:  Lidocaine 1% without epinephrine  Preparation:  Skin prepped with ChloraPrep  Skin prep agent dried: Skin prep agent completely dried prior to procedure    Sterile barriers: All five maximal sterile barriers used - gloves, gown, cap, mask and large sterile sheet    Hand hygiene: Hand hygiene performed immediately prior to central venous catheter insertion    Location:  Right internal jugular  Catheter type:  Trialysis  Catheter size:  13 Fr  Inserted Catheter Length (cm):  14  Ultrasound guidance: Yes    Needle advanced into vessel with real time ultrasound guidance.    Guidewire confirmed in vessel.    Steril sheath on probe.    Sterile gel used.  Manometry: No    Number of attempts:  1  Securement:  Line sutured, sterile dressing applied, blood return through all ports and chlorhexidine patch  XRay:  Placement verified by x-ray  Adverse Events:  NoneTermination Site: superior vena cava    Donna Esparza MD  Pulmonary and Critical Care Fellow  01/14/2023  2:35 PM      "

## 2023-01-14 NOTE — PROGRESS NOTES
Pharmacokinetic Initial Assessment: IV Vancomycin    Assessment/Plan:    Initiate intravenous vancomycin with loading dose of 1250 mg once (20 mg/kg loading dose d/t HINA) with subsequent doses when random concentrations are less than 20 mcg/mL  Desired empiric serum trough concentration is 15 to 20 mcg/mL  Draw vancomycin random level on 1/15 at 0400.  Pharmacy will continue to follow and monitor vancomycin.      Please contact pharmacy at extension [phone] with any questions regarding this assessment.     Thank you for the consult,   Elena Taylor       Patient brief summary:  Lily Green is a 72 y.o. female initiated on antimicrobial therapy with IV Vancomycin for treatment of suspected bacteremia    Drug Allergies:   Review of patient's allergies indicates:   Allergen Reactions    Crestor [rosuvastatin] Swelling    Gluten protein        Actual Body Weight:   61 kg    Renal Function:   Estimated Creatinine Clearance: 26.9 mL/min (A) (based on SCr of 1.7 mg/dL (H)).,     Dialysis Method (if applicable):  N/A    CBC (last 72 hours):  Recent Labs   Lab Result Units 01/13/23  1409   WBC K/uL 26.16*   Hemoglobin g/dL 17.2*   Hematocrit % 50.7*   Platelets K/uL 386   Gran % % 84.6*   Lymph % % 3.6*   Mono % % 11.0   Eosinophil % % 0.0   Basophil % % 0.3   Differential Method  Automated       Metabolic Panel (last 72 hours):  Recent Labs   Lab Result Units 01/13/23  1409 01/13/23  2348   Sodium mmol/L 135* 128*   Potassium mmol/L 4.1 4.4   Chloride mmol/L 96 100   CO2 mmol/L 18* 10*   Glucose mg/dL 269* 328*   BUN mg/dL 46* 58*   Creatinine mg/dL 0.88 1.7*   Albumin g/dL 5.0 2.6*   Total Bilirubin mg/dL 0.9 0.7   Alkaline Phosphatase U/L 377* 264*   AST U/L 67* 369*   ALT U/L 31 83*   Magnesium mg/dL  --  4.4*   Phosphorus mg/dL  --  4.5       Drug levels (last 3 results):  No results for input(s): VANCOMYCINRA, VANCORANDOM, VANCOMYCINPE, VANCOPEAK, VANCOMYCINTR, VANCOTROUGH in the last 72  hours.    Microbiologic Results:  Microbiology Results (last 7 days)       Procedure Component Value Units Date/Time    Clostridium difficile EIA [[id number]]     Order Status: No result Specimen: Stool

## 2023-01-14 NOTE — PLAN OF CARE
Problem: Adult Inpatient Plan of Care  Goal: Plan of Care Review  Outcome: Ongoing, Progressing   A&Ox4. Vital signs stable. No complaints of pain this shift. NGT placed. Placement confirmed. No urine output in bradford; bladder scanned with no urine found in bladder. Repositioned independently with minimal assistance. Safety precautions in place. Taken to emergency abdominal surgery. Plan of care reviewed with patient and family.

## 2023-01-14 NOTE — OP NOTE
PATIENT: Lily Green    MRN: [id number]    DATE OF PROCEDURE: 01/14/2023    PREOPERATIVE DIAGNOSIS:  Peritonitis    POSTOPERATIVE DIAGNOSIS:  Peritonitis secondary to diffuse colonic ischemia    PROCEDURE:  Total colectomy with mobilization of the splenic flexure and end ileostomy creation    SURGEON: Manuel Javier M.D.    ASSISTANT: None    ANESTHESIA:  General    ESTIMATED BLOOD LOSS:  Minimal    SPECIMEN:  As above    CONDITION:  Guarded    COMPLICATIONS: None    INDICATIONS: The patient is a 72-year-old white female who was transferred from an outside hospital to the intensive care unit.  Patient is requiring multiple pressors and showed signs of multi organ system failure and sepsis.  CT scan showed significant dilation of the colon with a possible stricture or lesion distally with upstream closed loop obstruction with nondilated small bowel.  Patient had significant peritonitis on exam.  She was taken emergently to surgery for exploratory laparotomy.  The risks, benefits, and alternatives to the procedure were explained to the patient and her  in detail.  All questions were answered and the patient requested the procedure be done.    PROCEDURE IN DETAIL:  After surgical consent was obtained, the patient was transported to the operative theater and onto the operating room table in the supine position.  General endotracheal anesthesia was administered without difficulty and a time-out was performed.  Patient was already receiving appropriate antibiotics and her abdomen was prepped and draped in a standard sterile fashion.  A midline incision was made through the skin and carried down through the subcutaneous tissues.  The fascia was divided sharply and the peritoneal cavity was entered bluntly.  There were some adhesions between the anterior abdominal wall and the omentum from prior surgery which were taken down using blunt dissection and electrocautery.  Upon entering the abdomen there was some  murky fluid but no overt feculent or purulent fluid.  There was significant edema of the entire colon with outwardly ischemic changes in the descending and sigmoid colon without overt necrosis.  The white line of Toldt was divided on the left side.  The distal sigmoid/upper rectum area was divided with a VANDA stapler and tagged with a long 2-0 silk suture.  The mesentery was taken with a LigaSure device.  The splenic flexure of the colon was mobilized.  The outward changes involve this area as well.  The gastrocolic ligament was divided.  We divided the transverse colon just past the splenic flexure with a VANDA stapler.  The specimen was removed from the field and sent for pathologic examination.  We irrigated out the abdomen with 4 L of sterile saline.  A ring of skin and subcutaneous fat was excised at the left lateral abdominal wall and the fascia was then divided in a cruciate manner.  The end colostomy was able to be brought up without issue.  The fascia was then closed using running looped PDS suture.  The subcutaneous tissues were irrigated and local anesthetic was injected.  The skin was closed with interrupted staples.  The staple line of the end colostomy was then removed and although the outward appearance of the colon was relatively normal the entire lumen was black and nonviable.  We then used another staple load to close the end colostomy and reopened the abdomen.  The white line of Toldt was divided on the right side and the colon was mobilized away from the kidney and duodenum.  The terminal ileum was divided using a VANDA stapler and the remaining colon along with a small piece of the terminal ileum were removed after dividing the mesentery with a LigaSure device.  We used another L of sterile saline to irrigate out the abdomen again.  The ileum was able to easily be brought over to the left side of the abdomen and up through the previous colostomy site without tension.  Once again the abdomen was  closed with looped PDS suture.  The subcutaneous tissues were irrigated out once again with sterile saline and the skin was closed with a stapling device.  A standard sterile dressing was applied.  The staple line was removed from the ileum and the end of the ileostomy was created with interrupted 3-0 Vicryl.  The end ileostomy had an excellent blood supply and there was no ischemia.  An appliance was fashioned around the ostomy without issue.  At this point the patient was awoken from anesthesia and returned to the intensive care unit intubated and in a guarded condition.  At the end of the procedure all counts were correct and the patient's family was updated.    DISPO: return to ICU      Manuel Javier M.D., F.A.C.S.  [id number] and General Surgery  [facility]

## 2023-01-14 NOTE — H&P
[facility]  History & Physical  Family Medicine       Admit Date: 2023   LOS: 0 days     Chief Complaint/Reason for Admission:    HPI    Patient is a 73 y/o female with PMHx of diabetes type 2 (treated with metformin), HTN, and celiac disease (following a strict diet) who presented to OSH after falling and injuring her face while getting out of bed. The patient reported experiencing nausea, vomiting, diarrhea, abdominal pain, and weakness starting the day before. She has had a weight loss of 60 pounds over an extended period of time, which may have been partially caused by her diet. The patient's review of systems was otherwise normal. Of note patient is currently taking Lyrica, which has recently been increased. On evaluation at bedside, patient feels good just with mild abdominal pain. No other symptoms. Denies LOC with falls, does not remember if she hit her head.    ED Course: Presented hypotensive and tachycardic. Labs showed elevated WBC of 26.16, Glucose 269, , LA 7, and Beta-hydroxybutyrate 0.6. She was acidotic with ph of 7.282 and AG 21. Rest of the labs unremarkable. CT ab/pel revealed distention of the colon with a large amount of stool and fluid, and a segment of the colon that appeared narrowed with possible thickening of the wall, strongly suspected to be a colonic mass. S/p enema and disimpaction. The patient was given fluids (3 L) and antibiotics and transferred for further evaluation and possible surgery      PMHx  Past Medical History:   Diagnosis Date    Anxiety     Celiac disease 2018    Celiac disease     Depression     Diabetes mellitus     Family history of breast cancer in mother      at age 68    Hyperlipidemia     Hypertension     Meniere disease     Menopause     Peptic ulcer     Reflux esophagitis     Urinary tract infection     Vaginal infection     Vaginal Pap smear 2014    Pap/Hpv Negative         PSHx  Past Surgical History:   Procedure  Laterality Date    APPENDECTOMY      BREAST SURGERY      Tags    CARPAL TUNNEL RELEASE Bilateral 09/2017    ,     COLONOSCOPY  04/2018    Normal  ( Juan A)     DILATION AND CURETTAGE OF UTERUS  1986    DUPUYTREN CONTRACTURE RELEASE Bilateral 09/2017    HYSTERECTOMY  1987    BEAU w/ appy, no BSO     INJECTION OF NEUROLYTIC AGENT AROUND LUMBAR SYMPATHETIC NERVE N/A 1/6/2022    Procedure: BLOCK, LUMBAR SYMPATHETIC;  Surgeon: Souleymane Rizo Jr., MD;  Location: [facility];  Service: Pain Management;  Laterality: N/A;  no pacemaker   pt is diabetic     INJECTION OF NEUROLYTIC AGENT AROUND LUMBAR SYMPATHETIC NERVE N/A 8/25/2022    Procedure: BLOCK, LUMBAR SYMPATHETIC;  Surgeon: Souleymane Rizo Jr., MD;  Location: [facility];  Service: Pain Management;  Laterality: N/A;  diabetic     SHOULDER SURGERY  2009 & 2010    right rotator cuff    TONSILLECTOMY      UPPER GASTROINTESTINAL ENDOSCOPY  04/2018       Family History  Family History   Problem Relation Age of Onset    Vision loss Father     Arthritis Father     Breast cancer Mother     Cancer Mother     Vision loss Mother     Hyperlipidemia Sister     Breast cancer Paternal Aunt     Cancer Paternal Aunt     Diabetes Paternal Grandfather     Vision loss Sister     Kidney disease Neg Hx        Social  Social History     Socioeconomic History    Marital status:    Tobacco Use    Smoking status: Never    Smokeless tobacco: Never   Substance and Sexual Activity    Alcohol use: No    Drug use: Never    Sexual activity: Not Currently     Partners: Male     Birth control/protection: See Surgical Hx     Comment: :      Social Determinants of Health     Financial Resource Strain: Low Risk     Difficulty of Paying Living Expenses: Not hard at all   Food Insecurity: No Food Insecurity    Worried About Running Out of Food in the Last Year: Never true    Ran Out of Food in the Last Year: Never true   Transportation Needs: No Transportation Needs    Lack  of Transportation (Medical): No    Lack of Transportation (Non-Medical): No   Physical Activity: Sufficiently Active    Days of Exercise per Week: 7 days    Minutes of Exercise per Session: 50 min   Stress: Stress Concern Present    Feeling of Stress : Very much   Social Connections: Unknown    Frequency of Communication with Friends and Family: More than three times a week    Frequency of Social Gatherings with Friends and Family: More than three times a week    Active Member of Clubs or Organizations: Yes    Attends Club or Organization Meetings: More than 4 times per year    Marital Status:    Housing Stability: Low Risk     Unable to Pay for Housing in the Last Year: No    Number of Places Lived in the Last Year: 1    Unstable Housing in the Last Year: No       Home Medications  Current Facility-Administered Medications on File Prior to Encounter   Medication Dose Route Frequency Provider Last Rate Last Admin    [COMPLETED] 0.9%  NaCl infusion  1,000 mL Intravenous ED 1 Time Malick Vo MD   Stopped at 01/13/23 1840    [COMPLETED] bisacodyL suppository 10 mg  10 mg Rectal ED 1 Time Malick Vo MD   10 mg at 01/13/23 1609    [COMPLETED] iohexoL (OMNIPAQUE 350) injection 75 mL  75 mL Intravenous ONCE PRN Malick Vo MD   75 mL at 01/13/23 1526    [COMPLETED] lactated ringers bolus 1,000 mL  1,000 mL Intravenous ED 1 Time Malick Vo MD   Stopped at 01/13/23 1626    [COMPLETED] lactated ringers bolus 800 mL  800 mL Intravenous ED 1 Time Malick Vo MD   Stopped at 01/13/23 1721    [COMPLETED] lactated ringers infusion  1,000 mL Intravenous ED 1 Time Martin Escalera,  mL/hr at 01/13/23 1830 1,000 mL at 01/13/23 1830    [COMPLETED] LIDOcaine HCL 10 mg/ml (1%) injection 10 mL  10 mL Infiltration ED 1 Time Malick Vo MD   10 mL at 01/13/23 1653    [COMPLETED] piperacillin-tazobactam (ZOSYN) 4.5 g in dextrose 5 % in water (D5W) 5 % 100 mL IVPB (MB+)  4.5 g Intravenous ED  1 Time Malick Vo MD   Stopped at 01/13/23 1651    [COMPLETED] sodium chloride 0.9% bolus 500 mL 500 mL  500 mL Intravenous ED 1 Time Malick Vo MD   Stopped at 01/13/23 1906    [DISCONTINUED] NORepinephrine 4 mg in dextrose 5 % 250 mL infusion  0-3 mcg/kg/min Intravenous Continuous Martin Escalera DO   Stopped at 01/13/23 2131    [DISCONTINUED] NORepinephrine bitartrate-NaCl 8 mg/250 mL (32 mcg/mL) infusion  0-3 mcg/kg/min Intravenous Continuous Martin Escalera DO        [DISCONTINUED] NORepinephrine bitartrate-NaCl 8 mg/250 mL (32 mcg/mL) infusion  0-3 mcg/kg/min Intravenous Continuous Martin Escalera .6 mL/hr at 01/13/23 2131 1 mcg/kg/min at 01/13/23 2131     Current Outpatient Medications on File Prior to Encounter   Medication Sig Dispense Refill    alpha lipoic acid 600 mg Cap       atorvastatin (LIPITOR) 40 MG tablet TAKE 1 TABLET BY MOUTH ONCE DAILY 90 tablet 3    betahistine HCl (BETAHISTINE, BULK, MISC)       blood sugar diagnostic (TRUE METRIX GLUCOSE TEST STRIP) Strp 1 strip by Misc.(Non-Drug; Combo Route) route 2 (two) times a day. 200 each 3    blood-glucose meter kit Use to test twice a day 1 each 0    calcium carbonate/vitamin D3 (CALTRATE WITH VITAMIN D3 ORAL) Take by mouth.      cetirizine HCl (ZYRTEC ORAL)       cinnamon bark (CINNAMON ORAL) Take by mouth.      coenzyme Q10 100 mg capsule       cyanocobalamin 1,000 mcg/mL injection Inject 1 mL (1,000 mcg total) into the muscle every 30 days. 10 mL 3    dicyclomine (BENTYL) 10 MG capsule       DULoxetine (CYMBALTA) 30 MG capsule Take 1 capsule (30 mg total) by mouth 2 (two) times daily. 60 capsule 11    econazole nitrate 1 % cream Apply topically 2 (two) times daily. 30 g 2    finasteride (PROSCAR) 5 mg tablet       flaxseed oil 1,000 mg Cap once a day      gluc-condr-om3-dha-epa-fish-st [phone] mg Cap once a day      glucosamine HCl 1,500 mg Tab Take 1,500 mg by mouth.      KRILL OIL ORAL Take by mouth.       lancets Misc 1 lancet by Misc.(Non-Drug; Combo Route) route once daily. 200 each 3    meclizine (ANTIVERT) 25 mg tablet Take 1 tablet (25 mg total) by mouth 3 (three) times daily as needed for Dizziness. 20 tablet 0    metFORMIN (GLUCOPHAGE) 500 MG tablet Take 1 tablet (500 mg total) by mouth 3 (three) times daily. 2 po qam, 1 po qhs. (Patient taking differently: Take 500 mg by mouth 2 (two) times daily. 1 po qam, 1 po qhs.) 270 tablet 3    MILK THISTLE ORAL Take by mouth.      pantoprazole (PROTONIX) 40 MG tablet TAKE 1 TABLET BY MOUTH ONCE DAILY  90 tablet 3    pregabalin (LYRICA) 150 MG capsule Take 1 capsule (150 mg total) by mouth 2 (two) times daily. 60 capsule 0    temazepam (RESTORIL) 30 mg capsule TAKE ONE CAPSULE BY MOUTH NIGHTLY AS NEEDED FOR INSOMNIA 30 capsule 2    triamcinolone acetonide 0.1% (KENALOG) 0.1 % ointment Apply topically 2 (two) times daily as needed (rash). 80 g 11    triamterene-hydrochlorothiazide 37.5-25 mg (MAXZIDE-25) 37.5-25 mg per tablet       vit C,N-Dv-cizax-lutein-zeaxan (PRESERVISION AREDS 2) [phone] mg-unit-mg-mg Cap       vitamins  A,C,E-zinc-copper [phone] unit-mg-unit Cap Take 1 tablet by mouth once daily.         Allergies  Review of patient's allergies indicates:   Allergen Reactions    Crestor [rosuvastatin] Swelling    Gluten protein        ROS- As per HPI, otherwise negative  Review of Systems   Constitutional:  Negative for chills, fever and malaise/fatigue.   HENT:  Negative for congestion, sinus pain and sore throat.    Eyes:  Negative for blurred vision and discharge.   Respiratory:  Negative for cough and shortness of breath.    Cardiovascular:  Negative for chest pain and leg swelling.   Gastrointestinal:  Positive for abdominal pain. Negative for diarrhea, heartburn, nausea and vomiting.   Genitourinary:  Negative for dysuria and hematuria.   Musculoskeletal:  Negative for back pain.   Skin:  Negative for rash.   Neurological:  Negative for dizziness  and headaches.   Psychiatric/Behavioral:  The patient is not nervous/anxious.      Objective  Vitals(Most Recent)      Temp: 100.1 °F (37.8 °C) (01/13/23 2235)  Pulse: 105 (01/13/23 2235)  Resp: (!) 26 (01/13/23 2235)  BP: 91/60 (01/13/23 2235)           Vitals Range  Temp:  [98.1 °F (36.7 °C)-100.1 °F (37.8 °C)]   Pulse:  []   Resp:  [16-36]   BP: ()/(46-79)   SpO2:  [82 %-100 %]     I/O  No intake/output data recorded.  No intake or output data in the 24 hours ending 01/13/23 2246      Physical Exam  Physical Exam  Constitutional:       General: She is not in acute distress.     Appearance: Normal appearance. She is normal weight.   HENT:      Head: Normocephalic and atraumatic.      Comments: Facial laceration on lip and ecchymosis of L eye     Mouth/Throat:      Mouth: Mucous membranes are moist.      Pharynx: Oropharynx is clear.   Eyes:      Extraocular Movements: Extraocular movements intact.      Conjunctiva/sclera: Conjunctivae normal.      Pupils: Pupils are equal, round, and reactive to light.   Cardiovascular:      Rate and Rhythm: Normal rate and regular rhythm.      Pulses: Normal pulses.      Heart sounds: Normal heart sounds.   Pulmonary:      Effort: Pulmonary effort is normal.      Breath sounds: Normal breath sounds.   Abdominal:      General: Abdomen is flat. Bowel sounds are absent. There is no distension.      Palpations: Abdomen is soft.      Tenderness: There is abdominal tenderness in the left lower quadrant. There is no guarding.   Musculoskeletal:      Cervical back: Normal range of motion.      Right lower leg: No edema.      Left lower leg: No edema.   Skin:     General: Skin is warm.      Capillary Refill: Capillary refill takes 2 to 3 seconds.   Neurological:      General: No focal deficit present.      Mental Status: She is alert and oriented to person, place, and time.   Psychiatric:         Mood and Affect: Mood normal.         Behavior: Behavior normal.        Labs  Recent Labs   Lab 01/13/23  1409   WBC 26.16*   RBC 5.50*   HGB 17.2*   HCT 50.7*      MCV 92   MCH 31.3*   MCHC 33.9      Recent Labs   Lab 01/13/23  1409   CALCIUM 8.8   PROT 9.8*   *   K 4.1   CO2 18*   CL 96   BUN 46*   CREATININE 0.88   ALKPHOS 377*   ALT 31   AST 67*   BILITOT 0.9        No results found for: INR, PROTIME, APTT    Recent Labs     01/13/23  1418   TROPONINI <0.012       A1c:   Lab Results   Component Value Date    HGBA1C 5.8 (H) 11/01/2022   , Last Gluc:   Recent Labs   Lab 01/13/23  1718   POCTGLUCOSE 212*       TSH:   Lab Results   Component Value Date    TSH 1.321 10/31/2019       UA  Urinalysis  No results for input(s): COLORU, CLARITYU, SPECGRAV, PHUR, PROTEINUA, GLUCOSEU, BILIRUBINCON, BLOODU, WBCU, RBCU, BACTERIA, MUCUS, NITRITE, LEUKOCYTESUR, UROBILINOGEN, HYALINECASTS in the last 24 hours.  No results for input(s): COLORU, CLARITYU, SPECGRAV, PHUR, PROTEINUA, GLUCOSEU, BLOODU, WBCU, RBCU, BACTERIA, MUCUS in the last 24 hours.    Invalid input(s):  BILIRUBINCON    Micro  Microbiology Results (last 7 days)       ** No results found for the last 168 hours. **             ABG  Recent Labs   Lab 01/13/23  1536   PH 7.282*   PCO2 41.0   PO2 37*   HCO3 19.4*   POCSATURATED 63*   BE -7       Imaging        Assessment/Plan:  Lily Green is a 72 y.o. female patient w/ PMHx of diabetes type 2 (treated with metformin), HTN, and celiac disease (following a strict diet)    Here with   1. Shock  2. Poss ischemic bowel/ acute abdomen    Neuro/Psych  Appears lethargic, slightly confused, but does answer questions appropriately  H/o of frequent falls  Home Lyrica recently increased.  CTH unremarkable  - Continue to monitor    CV  #Shock  Poss septic, LA 7  Responsive to fluids  Pressor requirements decreasing  - Will continue aggressive fluid resuscitation  - Will cont broad spectrum abx (Vanc, Cef, Flag)   - IV pressor support prn   - F/u echo  - Trend  lactate      Pulm  On RA no issues      FEN/GI  #Ischemic bowel  #Acute Abdomen  #Colonic mass/ obstruction  Although abdomen non concerning, imaging concerning  Also elevated LA concerning for ischemic bowel  CT ab/pel revealing distention of the colon with a large amount of stool and fluid, and a segment of the colon that appeared narrowed with possible thickening of the wall, strongly suspected to be a colonic mass  Lipase, Amylase elevated  Bladder pressure wnl  Gen surg consulted  - Diet: NPO  - Keep Mg 2, K 4  - Zofran prn for n/v   - F/u Gen surg recs    #hypocalcemia  6.8, monitor      RENAL  #HINA  Likely 2/2 to shock  UOP: minimal despite aggressive IVF  Strict I&Os  Avoid renal toxic meds  BUN/Cr: 58/1.7 > 60/1.9,   Continue to monitor renal status and urine output     Heme  H/Hstable  WBC 26 > 10, monitor  PT/INR 16.4/1.6  DVT prophylaxis: SCD    Endo  #poss starvation ketoacidosis  Elevated Bgs, AG, and slight B-hydrox  - Supportive management  - Maintain glucose 140-180  - IVF prn  - SSI and long acting  - F/u BMP    ID  Diarrhea concerning for infectious etiology  - Stool Samples ordered, f/u  - Continue ABX, as above  - Blood culture ordered        DVT PPx: SCD  Diet: NPO  GI PPX: None  Deconditioning: PT/OT  Code Status: Full      Dispo  - Poss acute/ ischemic bowel with bowel obstruction poss 2/2 to mass. Will f/u with surg recs. Continue current management for now      1/13/2023 Luis Gaytan M.D., -II  [facility]

## 2023-01-14 NOTE — ANESTHESIA PROCEDURE NOTES
Intubation    Date/Time: 1/14/2023 5:01 AM  Performed by: Dheeraj Bartlett Jr., CRNA  Authorized by: ANDRES Carrillo MD     Intubation:     Induction:  Rapid sequence induction    Intubated:  Postinduction    Mask Ventilation:  N/a    Attempts:  1    Attempted By:  CRNA    Method of Intubation:  Video laryngoscopy    Blade:  [place] 3    Laryngeal View Grade: Grade I - full view of cords      Difficult Airway Encountered?: No      Complications:  None    Airway Device:  Oral endotracheal tube    Airway Device Size:  7.5    Style/Cuff Inflation:  Cuffed (inflated to minimal occlusive pressure)    Tube secured:  22    Secured at:  The lips    Placement Verified By:  Capnometry    Complicating Factors:  None    Findings Post-Intubation:  BS equal bilateral and atraumatic/condition of teeth unchanged (patient had busted upper and lower lips prior to arrival in OR due to previous fall)

## 2023-01-14 NOTE — ANESTHESIA PROCEDURE NOTES
Arterial    Diagnosis: colon mass    Patient location during procedure: ICU  Procedure start time: 1/14/2023 4:42 AM  Timeout: 1/14/2023 4:41 AM  Procedure end time: 1/14/2023 4:47 AM    Staffing  Authorizing Provider: ANDRES Carrillo MD  Performing Provider: ANDRES Carrillo MD    Anesthesiologist was present at the time of the procedure.  Arterial  Skin Prep: chlorhexidine gluconate and isopropyl alcohol  Local Infiltration: lidocaine  Orientation: left  Location: radial    Catheter Size: 20 G  Catheter placement by Ultrasound guidance. Heme positive aspiration all ports.   Vessel Caliber: small, patent, compressibility normal  Needle advanced into vessel with real time Ultrasound guidance.Insertion Attempts: 1  Assessment  Dressing: secured with tape and tegaderm  Patient: Tolerated well

## 2023-01-14 NOTE — CARE UPDATE
Pulm/Crit update 1/14/23:    - Taken to OR with Dr Javier with colectomy performed due to ischemic gut  - Returned from OR intubated on levo/vaso but with rapid decrease in pressor requirements to levo 0.36. On stress dose steroids  - Making minimal urine, may need temporary dialysis although no emergent need yet. Will trend BMP/ABG  - Continue cefepime/flagyl/PO vanc for now  - No more fluid resuscitation   - Will consider vent liberation later today pending clinical response  - Goal RASS 0 - precedex if anything needed  - Increasing levemir -needs better glucose control    Updated sister/ at bedside    Donna Esparza MD  Pulmonary and Critical Care Fellow  01/14/2023  9:14 AM

## 2023-01-14 NOTE — CARE UPDATE
Patient remains anuric, repeat CMP with worsening hyponatremia and persistent acidosis. Discussed with nephrology and patient/family. Will proceed with dialysis. Will attempt HD with uptitration of vasopressors, if unable to tolerate will convert to CRRT.    Donna Esparza MD  Pulmonary and Critical Care Fellow  01/14/2023  2:36 PM

## 2023-01-14 NOTE — ANESTHESIA POSTPROCEDURE EVALUATION
Anesthesia Post Evaluation    Patient: Lily Green    Procedure(s) Performed: Procedure(s) (LRB):  LAPAROTOMY, EXPLORATORY (N/A)    Final Anesthesia Type: general      Patient location during evaluation: ICU  Patient participation: No - Unable to Participate, Intubation  Level of consciousness: sedated  Post-procedure vital signs: reviewed and stable  Pain management: adequate  Airway patency: patent    PONV status at discharge: No PONV  Anesthetic complications: no      Cardiovascular status: hemodynamically stable  Respiratory status: ETT and ventilator  Hydration status: euvolemic  Follow-up not needed.          Vitals Value Taken Time   /68 01/14/23 0903   Temp 37 °C (98.6 °F) 01/14/23 0815   Pulse 94 01/14/23 0925   Resp 27 01/14/23 0925   SpO2 100 % 01/14/23 0925   Vitals shown include unvalidated device data.      No case tracking events are documented in the log.      Pain/Meredith Score: No data recorded

## 2023-01-15 PROBLEM — R57.9 SHOCK: Status: ACTIVE | Noted: 2023-01-15

## 2023-01-15 PROBLEM — K55.9 LARGE BOWEL ISCHEMIA: Status: ACTIVE | Noted: 2023-01-15

## 2023-01-15 PROBLEM — N17.0 ATN (ACUTE TUBULAR NECROSIS): Status: ACTIVE | Noted: 2023-01-15

## 2023-01-15 PROBLEM — J96.01 ACUTE HYPOXEMIC RESPIRATORY FAILURE: Status: ACTIVE | Noted: 2023-01-15

## 2023-01-15 PROBLEM — N17.9 AKI (ACUTE KIDNEY INJURY): Status: ACTIVE | Noted: 2023-01-15

## 2023-01-15 LAB
ALBUMIN SERPL BCP-MCNC: 2.2 G/DL (ref 3.5–5.2)
ALP SERPL-CCNC: 137 U/L (ref 55–135)
ALT SERPL W/O P-5'-P-CCNC: 117 U/L (ref 10–44)
ANION GAP SERPL CALC-SCNC: 9 MMOL/L (ref 8–16)
AST SERPL-CCNC: 239 U/L (ref 10–40)
BACTERIA #/AREA URNS HPF: NORMAL /HPF
BASOPHILS NFR BLD: 0 % (ref 0–1.9)
BILIRUB SERPL-MCNC: 0.6 MG/DL (ref 0.1–1)
BILIRUB UR QL STRIP: NEGATIVE
BUN SERPL-MCNC: 39 MG/DL (ref 8–23)
BURR CELLS BLD QL SMEAR: ABNORMAL
CALCIUM SERPL-MCNC: 7.6 MG/DL (ref 8.7–10.5)
CHLORIDE SERPL-SCNC: 100 MMOL/L (ref 95–110)
CLARITY UR: ABNORMAL
CO2 SERPL-SCNC: 23 MMOL/L (ref 23–29)
COLOR UR: YELLOW
CREAT SERPL-MCNC: 2.4 MG/DL (ref 0.5–1.4)
DACRYOCYTES BLD QL SMEAR: ABNORMAL
DIFFERENTIAL METHOD: ABNORMAL
EOSINOPHIL NFR BLD: 0 % (ref 0–8)
ERYTHROCYTE [DISTWIDTH] IN BLOOD BY AUTOMATED COUNT: 12.1 % (ref 11.5–14.5)
EST. GFR  (NO RACE VARIABLE): 21 ML/MIN/1.73 M^2
GIANT PLATELETS BLD QL SMEAR: PRESENT
GLUCOSE SERPL-MCNC: 47 MG/DL (ref 70–110)
GLUCOSE UR QL STRIP: ABNORMAL
HCT VFR BLD AUTO: 27.9 % (ref 37–48.5)
HGB BLD-MCNC: 10 G/DL (ref 12–16)
HGB UR QL STRIP: ABNORMAL
HIV 1+2 AB+HIV1 P24 AG SERPL QL IA: NORMAL
HYPOCHROMIA BLD QL SMEAR: ABNORMAL
IMM GRANULOCYTES # BLD AUTO: ABNORMAL K/UL (ref 0–0.04)
IMM GRANULOCYTES NFR BLD AUTO: ABNORMAL % (ref 0–0.5)
KETONES UR QL STRIP: NEGATIVE
LEUKOCYTE ESTERASE UR QL STRIP: NEGATIVE
LIPASE SERPL-CCNC: 63 U/L (ref 4–60)
LYMPHOCYTES NFR BLD: 7 % (ref 18–48)
MAGNESIUM SERPL-MCNC: 2.7 MG/DL (ref 1.6–2.6)
MCH RBC QN AUTO: 31.2 PG (ref 27–31)
MCHC RBC AUTO-ENTMCNC: 35.8 G/DL (ref 32–36)
MCV RBC AUTO: 87 FL (ref 82–98)
MICROSCOPIC COMMENT: NORMAL
MONOCYTES NFR BLD: 14 % (ref 4–15)
NEUTROPHILS NFR BLD: 79 % (ref 38–73)
NITRITE UR QL STRIP: NEGATIVE
NRBC BLD-RTO: 0 /100 WBC
OSMOLALITY UR: 287 MOSM/KG (ref 50–1200)
OVALOCYTES BLD QL SMEAR: ABNORMAL
PH UR STRIP: 7 [PH] (ref 5–8)
PHOSPHATE SERPL-MCNC: 5.3 MG/DL (ref 2.7–4.5)
PLATELET # BLD AUTO: 174 K/UL (ref 150–450)
PLATELET BLD QL SMEAR: ABNORMAL
PMV BLD AUTO: 10.9 FL (ref 9.2–12.9)
POCT GLUCOSE: 105 MG/DL (ref 70–110)
POCT GLUCOSE: 117 MG/DL (ref 70–110)
POCT GLUCOSE: 143 MG/DL (ref 70–110)
POCT GLUCOSE: 173 MG/DL (ref 70–110)
POCT GLUCOSE: 210 MG/DL (ref 70–110)
POCT GLUCOSE: 42 MG/DL (ref 70–110)
POCT GLUCOSE: 52 MG/DL (ref 70–110)
POCT GLUCOSE: 56 MG/DL (ref 70–110)
POCT GLUCOSE: 57 MG/DL (ref 70–110)
POCT GLUCOSE: 71 MG/DL (ref 70–110)
POCT GLUCOSE: 94 MG/DL (ref 70–110)
POTASSIUM SERPL-SCNC: 4.7 MMOL/L (ref 3.5–5.1)
PROT SERPL-MCNC: 5.2 G/DL (ref 6–8.4)
PROT UR QL STRIP: ABNORMAL
RBC # BLD AUTO: 3.21 M/UL (ref 4–5.4)
RBC #/AREA URNS HPF: 4 /HPF (ref 0–4)
SODIUM SERPL-SCNC: 132 MMOL/L (ref 136–145)
SODIUM UR-SCNC: 124 MMOL/L (ref 20–250)
SP GR UR STRIP: 1.01 (ref 1–1.03)
URN SPEC COLLECT METH UR: ABNORMAL
UROBILINOGEN UR STRIP-ACNC: NEGATIVE EU/DL
WBC # BLD AUTO: 18.41 K/UL (ref 3.9–12.7)
YEAST URNS QL MICRO: NORMAL

## 2023-01-15 PROCEDURE — 20000000 HC ICU ROOM

## 2023-01-15 PROCEDURE — S5010 5% DEXTROSE AND 0.45% SALINE: HCPCS | Performed by: STUDENT IN AN ORGANIZED HEALTH CARE EDUCATION/TRAINING PROGRAM

## 2023-01-15 PROCEDURE — 80053 COMPREHEN METABOLIC PANEL: CPT | Performed by: STUDENT IN AN ORGANIZED HEALTH CARE EDUCATION/TRAINING PROGRAM

## 2023-01-15 PROCEDURE — 83690 ASSAY OF LIPASE: CPT | Performed by: STUDENT IN AN ORGANIZED HEALTH CARE EDUCATION/TRAINING PROGRAM

## 2023-01-15 PROCEDURE — 85027 COMPLETE CBC AUTOMATED: CPT | Performed by: STUDENT IN AN ORGANIZED HEALTH CARE EDUCATION/TRAINING PROGRAM

## 2023-01-15 PROCEDURE — 87389 HIV-1 AG W/HIV-1&-2 AB AG IA: CPT | Performed by: STUDENT IN AN ORGANIZED HEALTH CARE EDUCATION/TRAINING PROGRAM

## 2023-01-15 PROCEDURE — 25000003 PHARM REV CODE 250: Performed by: STUDENT IN AN ORGANIZED HEALTH CARE EDUCATION/TRAINING PROGRAM

## 2023-01-15 PROCEDURE — 63600175 PHARM REV CODE 636 W HCPCS: Performed by: SURGERY

## 2023-01-15 PROCEDURE — 84300 ASSAY OF URINE SODIUM: CPT | Performed by: STUDENT IN AN ORGANIZED HEALTH CARE EDUCATION/TRAINING PROGRAM

## 2023-01-15 PROCEDURE — 63600175 PHARM REV CODE 636 W HCPCS: Performed by: STUDENT IN AN ORGANIZED HEALTH CARE EDUCATION/TRAINING PROGRAM

## 2023-01-15 PROCEDURE — 84100 ASSAY OF PHOSPHORUS: CPT | Performed by: STUDENT IN AN ORGANIZED HEALTH CARE EDUCATION/TRAINING PROGRAM

## 2023-01-15 PROCEDURE — 81000 URINALYSIS NONAUTO W/SCOPE: CPT | Performed by: STUDENT IN AN ORGANIZED HEALTH CARE EDUCATION/TRAINING PROGRAM

## 2023-01-15 PROCEDURE — 83735 ASSAY OF MAGNESIUM: CPT | Performed by: STUDENT IN AN ORGANIZED HEALTH CARE EDUCATION/TRAINING PROGRAM

## 2023-01-15 PROCEDURE — 94761 N-INVAS EAR/PLS OXIMETRY MLT: CPT

## 2023-01-15 PROCEDURE — 83935 ASSAY OF URINE OSMOLALITY: CPT | Performed by: STUDENT IN AN ORGANIZED HEALTH CARE EDUCATION/TRAINING PROGRAM

## 2023-01-15 PROCEDURE — S0030 INJECTION, METRONIDAZOLE: HCPCS | Performed by: STUDENT IN AN ORGANIZED HEALTH CARE EDUCATION/TRAINING PROGRAM

## 2023-01-15 PROCEDURE — 85007 BL SMEAR W/DIFF WBC COUNT: CPT | Performed by: STUDENT IN AN ORGANIZED HEALTH CARE EDUCATION/TRAINING PROGRAM

## 2023-01-15 RX ORDER — ACETAMINOPHEN 325 MG/1
650 TABLET ORAL EVERY 6 HOURS PRN
Status: DISCONTINUED | OUTPATIENT
Start: 2023-01-15 | End: 2023-01-15

## 2023-01-15 RX ORDER — DEXTROSE MONOHYDRATE AND SODIUM CHLORIDE 5; .45 G/100ML; G/100ML
INJECTION, SOLUTION INTRAVENOUS CONTINUOUS
Status: DISCONTINUED | OUTPATIENT
Start: 2023-01-15 | End: 2023-01-16

## 2023-01-15 RX ORDER — FLUDROCORTISONE ACETATE 0.1 MG/1
100 TABLET ORAL DAILY
Status: DISCONTINUED | OUTPATIENT
Start: 2023-01-15 | End: 2023-01-16

## 2023-01-15 RX ORDER — HYDROMORPHONE HYDROCHLORIDE 1 MG/ML
0.2 INJECTION, SOLUTION INTRAMUSCULAR; INTRAVENOUS; SUBCUTANEOUS EVERY 4 HOURS PRN
Status: DISCONTINUED | OUTPATIENT
Start: 2023-01-15 | End: 2023-01-16

## 2023-01-15 RX ORDER — ACETAMINOPHEN 325 MG/1
650 TABLET ORAL EVERY 12 HOURS PRN
Status: DISCONTINUED | OUTPATIENT
Start: 2023-01-15 | End: 2023-01-19

## 2023-01-15 RX ORDER — HEPARIN SODIUM 5000 [USP'U]/ML
5000 INJECTION, SOLUTION INTRAVENOUS; SUBCUTANEOUS EVERY 8 HOURS
Status: DISCONTINUED | OUTPATIENT
Start: 2023-01-15 | End: 2023-01-16

## 2023-01-15 RX ORDER — TALC
9 POWDER (GRAM) TOPICAL NIGHTLY PRN
Status: DISCONTINUED | OUTPATIENT
Start: 2023-01-15 | End: 2023-01-26 | Stop reason: HOSPADM

## 2023-01-15 RX ORDER — HYDROCODONE BITARTRATE AND ACETAMINOPHEN 5; 325 MG/1; MG/1
1 TABLET ORAL EVERY 6 HOURS PRN
Status: DISPENSED | OUTPATIENT
Start: 2023-01-15 | End: 2023-01-19

## 2023-01-15 RX ORDER — PROMETHAZINE HYDROCHLORIDE 6.25 MG/5ML
12.5 SYRUP ORAL EVERY 6 HOURS PRN
Status: DISCONTINUED | OUTPATIENT
Start: 2023-01-15 | End: 2023-01-26 | Stop reason: HOSPADM

## 2023-01-15 RX ORDER — DEXTROSE MONOHYDRATE AND SODIUM CHLORIDE 5; .45 G/100ML; G/100ML
INJECTION, SOLUTION INTRAVENOUS CONTINUOUS
Status: DISCONTINUED | OUTPATIENT
Start: 2023-01-15 | End: 2023-01-15

## 2023-01-15 RX ORDER — DICYCLOMINE HYDROCHLORIDE 10 MG/1
10 CAPSULE ORAL 4 TIMES DAILY PRN
Status: DISCONTINUED | OUTPATIENT
Start: 2023-01-15 | End: 2023-01-26 | Stop reason: HOSPADM

## 2023-01-15 RX ORDER — TALC
9 POWDER (GRAM) TOPICAL NIGHTLY PRN
Status: DISCONTINUED | OUTPATIENT
Start: 2023-01-15 | End: 2023-01-15

## 2023-01-15 RX ORDER — FLUDROCORTISONE ACETATE 0.1 MG/1
100 TABLET ORAL DAILY
Status: DISCONTINUED | OUTPATIENT
Start: 2023-01-15 | End: 2023-01-15

## 2023-01-15 RX ORDER — ONDANSETRON 2 MG/ML
4 INJECTION INTRAMUSCULAR; INTRAVENOUS EVERY 8 HOURS PRN
Status: DISCONTINUED | OUTPATIENT
Start: 2023-01-15 | End: 2023-01-26 | Stop reason: HOSPADM

## 2023-01-15 RX ADMIN — HYDROMORPHONE HYDROCHLORIDE 0.2 MG: 1 INJECTION, SOLUTION INTRAMUSCULAR; INTRAVENOUS; SUBCUTANEOUS at 08:01

## 2023-01-15 RX ADMIN — METRONIDAZOLE 500 MG: 5 INJECTION, SOLUTION INTRAVENOUS at 04:01

## 2023-01-15 RX ADMIN — CEFEPIME 2 G: 2 INJECTION, POWDER, FOR SOLUTION INTRAVENOUS at 08:01

## 2023-01-15 RX ADMIN — HYDROCORTISONE SODIUM SUCCINATE 50 MG: 100 INJECTION, POWDER, FOR SOLUTION INTRAMUSCULAR; INTRAVENOUS at 06:01

## 2023-01-15 RX ADMIN — HEPARIN SODIUM 5000 UNITS: 5000 INJECTION, SOLUTION INTRAVENOUS; SUBCUTANEOUS at 02:01

## 2023-01-15 RX ADMIN — FAMOTIDINE 20 MG: 10 INJECTION, SOLUTION INTRAVENOUS at 08:01

## 2023-01-15 RX ADMIN — HYDROCORTISONE SODIUM SUCCINATE 50 MG: 100 INJECTION, POWDER, FOR SOLUTION INTRAMUSCULAR; INTRAVENOUS at 11:01

## 2023-01-15 RX ADMIN — ONDANSETRON HYDROCHLORIDE 4 MG: 2 SOLUTION INTRAMUSCULAR; INTRAVENOUS at 02:01

## 2023-01-15 RX ADMIN — DEXTROSE 125 ML: 10 SOLUTION INTRAVENOUS at 04:01

## 2023-01-15 RX ADMIN — METRONIDAZOLE 500 MG: 5 INJECTION, SOLUTION INTRAVENOUS at 01:01

## 2023-01-15 RX ADMIN — DICYCLOMINE HYDROCHLORIDE 10 MG: 10 CAPSULE ORAL at 07:01

## 2023-01-15 RX ADMIN — DEXTROSE 125 ML: 10 SOLUTION INTRAVENOUS at 12:01

## 2023-01-15 RX ADMIN — CHLORHEXIDINE GLUCONATE 0.12% ORAL RINSE 15 ML: 1.2 LIQUID ORAL at 09:01

## 2023-01-15 RX ADMIN — FLUDROCORTISONE ACETATE 100 MCG: 0.1 TABLET ORAL at 08:01

## 2023-01-15 RX ADMIN — HYDROCORTISONE SODIUM SUCCINATE 50 MG: 100 INJECTION, POWDER, FOR SOLUTION INTRAMUSCULAR; INTRAVENOUS at 05:01

## 2023-01-15 RX ADMIN — DICYCLOMINE HYDROCHLORIDE 10 MG: 10 CAPSULE ORAL at 12:01

## 2023-01-15 RX ADMIN — DEXTROSE 125 ML: 10 SOLUTION INTRAVENOUS at 06:01

## 2023-01-15 RX ADMIN — HYDROCODONE BITARTRATE AND ACETAMINOPHEN 1 TABLET: 5; 325 TABLET ORAL at 04:01

## 2023-01-15 RX ADMIN — MUPIROCIN: 20 OINTMENT TOPICAL at 09:01

## 2023-01-15 RX ADMIN — Medication 9 MG: at 09:01

## 2023-01-15 RX ADMIN — Medication 500 MG: at 05:01

## 2023-01-15 RX ADMIN — HYDROMORPHONE HYDROCHLORIDE 0.2 MG: 1 INJECTION, SOLUTION INTRAMUSCULAR; INTRAVENOUS; SUBCUTANEOUS at 06:01

## 2023-01-15 RX ADMIN — HEPARIN SODIUM 5000 UNITS: 5000 INJECTION, SOLUTION INTRAVENOUS; SUBCUTANEOUS at 09:01

## 2023-01-15 RX ADMIN — DEXTROSE AND SODIUM CHLORIDE: 5; .45 INJECTION, SOLUTION INTRAVENOUS at 09:01

## 2023-01-15 RX ADMIN — HYDROMORPHONE HYDROCHLORIDE 0.2 MG: 1 INJECTION, SOLUTION INTRAMUSCULAR; INTRAVENOUS; SUBCUTANEOUS at 12:01

## 2023-01-15 RX ADMIN — CHLORHEXIDINE GLUCONATE 0.12% ORAL RINSE 15 ML: 1.2 LIQUID ORAL at 08:01

## 2023-01-15 RX ADMIN — Medication 500 MG: at 11:01

## 2023-01-15 RX ADMIN — PROMETHAZINE HYDROCHLORIDE 12.5 MG: 6.25 SOLUTION ORAL at 06:01

## 2023-01-15 RX ADMIN — HYDROMORPHONE HYDROCHLORIDE 0.2 MG: 1 INJECTION, SOLUTION INTRAMUSCULAR; INTRAVENOUS; SUBCUTANEOUS at 10:01

## 2023-01-15 RX ADMIN — DICYCLOMINE HYDROCHLORIDE 10 MG: 10 CAPSULE ORAL at 09:01

## 2023-01-15 RX ADMIN — METRONIDAZOLE 500 MG: 5 INJECTION, SOLUTION INTRAVENOUS at 09:01

## 2023-01-15 NOTE — PLAN OF CARE
Problem: Adult Inpatient Plan of Care  Goal: Plan of Care Review  Outcome: Ongoing, Progressing  Goal: Patient-Specific Goal (Individualized)  Outcome: Ongoing, Progressing  Goal: Absence of Hospital-Acquired Illness or Injury  Outcome: Ongoing, Progressing  Goal: Optimal Comfort and Wellbeing  Outcome: Ongoing, Progressing  Goal: Readiness for Transition of Care  Outcome: Ongoing, Progressing     Problem: Diabetes Comorbidity  Goal: Blood Glucose Level Within Targeted Range  Outcome: Ongoing, Progressing     Problem: Infection  Goal: Absence of Infection Signs and Symptoms  Outcome: Ongoing, Progressing     Problem: Skin Injury Risk Increased  Goal: Skin Health and Integrity  Outcome: Ongoing, Progressing     Problem: Fall Injury Risk  Goal: Absence of Fall and Fall-Related Injury  Outcome: Ongoing, Progressing     Problem: Inability to Wean (Mechanical Ventilation, Invasive)  Goal: Mechanical Ventilation Liberation  Outcome: Met    POC reviewed with patient

## 2023-01-15 NOTE — PROGRESS NOTES
[facility] Progress Note    Subjective:      Ms. Green is feeling ok today. Having decent control of abdominal pain but every now and then gets sharp shooting pain that lasts several seconds. Feels like she has a mental fog and wondering when that will get better. No other complaints today. Tolerated HD yesterday, made about 60ml urine so far today.      Objective:   Last 24 Hour Vital Signs:  BP  Min: 86/50  Max: 135/60  Temp  Av.5 °F (36.9 °C)  Min: 97.9 °F (36.6 °C)  Max: 98.9 °F (37.2 °C)  Pulse  Av.4  Min: 66  Max: 92  Resp  Av.3  Min: 13  Max: 29  SpO2  Av.9 %  Min: 97 %  Max: 100 %  Weight  Av kg (132 lb 4.4 oz)  Min: 60 kg (132 lb 4.4 oz)  Max: 60 kg (132 lb 4.4 oz)  I/O last 3 completed shifts:  In: 4760 [I.V.:1164.4; Blood:250; Other:250; NG/GT:150; IV Piggyback:2945.6]  Out: 1085 [Urine:165; Other:500; Stool:420]    Physical Examination:  Gen: awake, alert, NAD  HEENT: normocephalic, EOMI, MMM  CV: RRR, no murmurs   Lungs: CTAB, symmetric chest rise  GI: Soft, tender to palpation, new colostomy present  Extrem: no edema, clubbing, or cyanosis  Skin: dry, warm, intact.   Neuro: AAOx3, no focal deficits         Laboratory:  Recent Labs   Lab 23  0317 23  0844 23  1247 01/14/23  2000 01/15/23  0331   WBC 10.41 13.28*  --   --  18.41*   HGB 15.5 12.2  --   --  10.0*   HCT 47.1 35.8*  --   --  27.9*    248  --   --  174   * 130* 126* 131* 132*   K 4.5 5.1 4.8 4.2 4.7   CL 98 102 102 99 100   CREATININE 1.9* 2.2* 2.4* 1.6* 2.4*   BUN 58* 60* 62* 31* 39*   CO2 12* 14* 13* 21* 23   ALT 96* 92* 94*  --  117*   * 294* 243*  --  239*           Radiology:  Imaging has been reviewed personally.       Assessment and Plan:     Lily Green is a 72 y.o. woman with a history of celiac disease, DM2 well controlled, HTN, anxiety presented to [facility] hospital after having a fall. Noted to have severe lactic acidosis with imaging concerning for  ischemic colitis. Became hypotensive and started on levophed and vasopressin for shock. On arrival to [place] taken to OR for colectomy. UOP severely diminished with only about 100ml total in first 24 hours of hospitalization. [facility] consulted for severe metabolic acidosis with pH 7.14 and HINA in setting of septic shock and ischemic colitis. HD initiated 1/14 and had one session with improvement in acidosis and coming off pressors now.      Stage III Oliguric HINA  - baseline creatinine 0.8  - presenting in shock on pressors with ischemic colitis  - s/p colectomy 1/14   - remained oliguric with refractory acidosis after surgery  - HD initiated 1/14   - no indication for HD today  - follow UOP closely, only about 60ml so far today  - anticipate she will likely need HD again tomorrow and has not yet shown recovery   - renally dose all medications      Anion Gap Metabolic Acidosis  - initial bicarb 12; lactate 7; pH 7.14; gap 21  - s/p colectomy but acidosis persistent  - HD 1/14 with bicarb now at 23 and much improved      Hyponatremia  - appears euvolemic on exam; s/p 3L input and minimal output  - Na down to 126 and then initiated HD with today Na up to 132   - Kris 124, not consistent with hypovolemia so she has been adequately resuscitated      Hyperphosphatemia  - phos 5.3, related to HINA. No intervention at this time     Hypocalcemia  - Ca 7.6 but corrects for low albumin       Thank you for allowing us to participate in the care of this patient. Please call with any questions.       Elizabeth Mobley, DO  [facility]

## 2023-01-15 NOTE — PROGRESS NOTES
[facility]  History & Physical  Family Medicine       Admit Date: 2023   LOS: 2 days     Chief Complaint/Reason for Admission: Shock    HPI     Patient is a 73 y/o female with PMHx of diabetes type 2 (treated with metformin), HTN, and celiac disease (following a strict diet) who presented to OSH after falling and injuring her face while getting out of bed. The patient reported experiencing nausea, vomiting, diarrhea, abdominal pain, and weakness starting the day before. She has had a weight loss of 60 pounds over an extended period of time, which may have been partially caused by her diet. The patient's review of systems was otherwise normal. Of note patient is currently taking Lyrica, which has recently been increased. On evaluation at bedside, patient feels good just with mild abdominal pain. No other symptoms. Denies LOC with falls, does not remember if she hit her head.     ED Course: Presented hypotensive and tachycardic. Labs showed elevated WBC of 26.16, Glucose 269, , LA 7, and Beta-hydroxybutyrate 0.6. She was acidotic with ph of 7.282 and AG 21. Rest of the labs unremarkable. CT ab/pel revealed distention of the colon with a large amount of stool and fluid, and a segment of the colon that appeared narrowed with possible thickening of the wall, strongly suspected to be a colonic mass. S/p enema and disimpaction. The patient was given fluids (3 L) and antibiotics and transferred for further evaluation and possible surgery       Interval History:S/P colectomy, abdominal pain this AM. Glucose 47 corrected with D10W. Levophed turned off this AM        PMHx  Past Medical History:   Diagnosis Date    Anxiety     Celiac disease 2018    Celiac disease     Depression     Diabetes mellitus     Family history of breast cancer in mother      at age 68    Hyperlipidemia     Hypertension     Meniere disease     Menopause     Peptic ulcer     Reflux esophagitis     Urinary tract  infection     Vaginal infection     Vaginal Pap smear 04/07/2014    Pap/Hpv Negative         PSHx  Past Surgical History:   Procedure Laterality Date    APPENDECTOMY      BREAST SURGERY      Tags    CARPAL TUNNEL RELEASE Bilateral 09/2017    ,     COLONOSCOPY  04/2018    Normal  ([facility])     DILATION AND CURETTAGE OF UTERUS  1986    DUPUYTREN CONTRACTURE RELEASE Bilateral 09/2017    HYSTERECTOMY  1987    BEAU w/ appy, no BSO     INJECTION OF NEUROLYTIC AGENT AROUND LUMBAR SYMPATHETIC NERVE N/A 1/6/2022    Procedure: BLOCK, LUMBAR SYMPATHETIC;  Surgeon: Souleymane Rizo Jr., MD;  Location: [facility];  Service: Pain Management;  Laterality: N/A;  no pacemaker   pt is diabetic     INJECTION OF NEUROLYTIC AGENT AROUND LUMBAR SYMPATHETIC NERVE N/A 8/25/2022    Procedure: BLOCK, LUMBAR SYMPATHETIC;  Surgeon: Souleymane Rizo Jr., MD;  Location: [facility];  Service: Pain Management;  Laterality: N/A;  diabetic     SHOULDER SURGERY  2009 & 2010    right rotator cuff    TONSILLECTOMY      UPPER GASTROINTESTINAL ENDOSCOPY  04/2018       Family History  Family History   Problem Relation Age of Onset    Vision loss Father     Arthritis Father     Breast cancer Mother     Cancer Mother     Vision loss Mother     Hyperlipidemia Sister     Breast cancer Paternal Aunt     Cancer Paternal Aunt     Diabetes Paternal Grandfather     Vision loss Sister     Kidney disease Neg Hx        Social  Social History     Socioeconomic History    Marital status:    Tobacco Use    Smoking status: Never    Smokeless tobacco: Never   Substance and Sexual Activity    Alcohol use: No    Drug use: Never    Sexual activity: Not Currently     Partners: Male     Birth control/protection: See Surgical Hx     Comment: :      Social Determinants of Health     Financial Resource Strain: Low Risk     Difficulty of Paying Living Expenses: Not hard at all   Food Insecurity: No Food Insecurity    Worried About Running Out of  Food in the Last Year: Never true    Ran Out of Food in the Last Year: Never true   Transportation Needs: No Transportation Needs    Lack of Transportation (Medical): No    Lack of Transportation (Non-Medical): No   Physical Activity: Sufficiently Active    Days of Exercise per Week: 7 days    Minutes of Exercise per Session: 50 min   Stress: Stress Concern Present    Feeling of Stress : Very much   Social Connections: Unknown    Frequency of Communication with Friends and Family: More than three times a week    Frequency of Social Gatherings with Friends and Family: More than three times a week    Active Member of Clubs or Organizations: Yes    Attends Club or Organization Meetings: More than 4 times per year    Marital Status:    Housing Stability: Low Risk     Unable to Pay for Housing in the Last Year: No    Number of Places Lived in the Last Year: 1    Unstable Housing in the Last Year: No         Allergies  Review of patient's allergies indicates:   Allergen Reactions    Crestor [rosuvastatin] Swelling    Gluten protein        ROS- As per HPI, otherwise negative  Review of Systems   Constitutional:  Negative for chills, fever and malaise/fatigue.   HENT:  Negative for congestion, sinus pain and sore throat.    Eyes:  Negative for blurred vision and discharge.   Respiratory:  Negative for cough and shortness of breath.    Cardiovascular:  Negative for chest pain and leg swelling.   Gastrointestinal:  Positive for abdominal pain. Negative for diarrhea, heartburn, nausea and vomiting.   Genitourinary:  Negative for dysuria and hematuria.   Musculoskeletal:  Negative for back pain.   Skin:  Negative for rash.   Neurological:  Negative for dizziness and headaches.   Psychiatric/Behavioral:  The patient is not nervous/anxious.      Objective  Vitals(Most Recent)      Temp: 97.9 °F (36.6 °C) (01/15/23 0301)  Pulse: 78 (01/15/23 0900)  Resp: 15 (01/15/23 0900)  BP: (!) 105/59 (01/15/23 0900)  SpO2: 99 %  (01/15/23 0900)           Vitals Range  Temp:  [97.9 °F (36.6 °C)-100.3 °F (37.9 °C)]   Pulse:  [66-92]   Resp:  [13-29]   BP: ()/(50-75)   SpO2:  [97 %-100 %]   Arterial Line BP: ()/(41-59)     I/O  I/O last 3 completed shifts:  In: 4760 [I.V.:1164.4; Blood:250; Other:250; NG/GT:150; IV Piggyback:2945.6]  Out: 1085 [Urine:165; Other:500; Stool:420]    Intake/Output Summary (Last 24 hours) at 1/15/2023 0930  Last data filed at 1/15/2023 0900  Gross per 24 hour   Intake 1332.47 ml   Output 735 ml   Net 597.47 ml           Physical Exam  Physical Exam  Constitutional:       General: She is not in acute distress.     Appearance: Normal appearance. She is normal weight.   HENT:      Head: Normocephalic and atraumatic.      Comments: Facial laceration on lip and ecchymosis of L eye     Mouth/Throat:      Mouth: Mucous membranes are moist.      Pharynx: Oropharynx is clear.   Eyes:      Extraocular Movements: Extraocular movements intact.      Conjunctiva/sclera: Conjunctivae normal.      Pupils: Pupils are equal, round, and reactive to light.   Cardiovascular:      Rate and Rhythm: Normal rate and regular rhythm.      Pulses: Normal pulses.      Heart sounds: Normal heart sounds.   Pulmonary:      Effort: Pulmonary effort is normal.      Breath sounds: Normal breath sounds.   Abdominal:      General: Abdomen is flat. Bowel sounds are absent. There is no distension.      Palpations: Abdomen is soft.      Tenderness: There is abdominal tenderness in the left lower quadrant. There is no guarding.   Musculoskeletal:      Cervical back: Normal range of motion.      Right lower leg: No edema.      Left lower leg: No edema.   Skin:     General: Skin is warm.      Capillary Refill: Capillary refill takes 2 to 3 seconds.   Neurological:      General: No focal deficit present.      Mental Status: She is alert and oriented to person, place, and time.   Psychiatric:         Mood and Affect: Mood normal.         Behavior:  Behavior normal.       Labs  Recent Labs   Lab 01/15/23  0331   WBC 18.41*   RBC 3.21*   HGB 10.0*   HCT 27.9*      MCV 87   MCH 31.2*   MCHC 35.8        Recent Labs   Lab 01/15/23  0331   CALCIUM 7.6*   PROT 5.2*   *   K 4.7   CO2 23      BUN 39*   CREATININE 2.4*   ALKPHOS 137*   *   *   BILITOT 0.6          Lab Results   Component Value Date    INR 1.6 (H) 01/14/2023       Recent Labs     01/13/23  1418 01/14/23  0844   TROPONINI <0.012  --    CPK  --  111         A1c:   Lab Results   Component Value Date    HGBA1C 5.8 (H) 11/01/2022     , Last Gluc:   Recent Labs   Lab 01/14/23  1251 01/14/23  1836 01/14/23  2328 01/15/23  0505 01/15/23  0602 01/15/23  0631   POCTGLUCOSE 233* 139* 81 105 71 117*         TSH:   Lab Results   Component Value Date    TSH 1.821 01/13/2023       UA  Urinalysis  Recent Labs   Lab 01/15/23  0120   COLORU Yellow   SPECGRAV 1.015   PHUR 7.0   PROTEINUA Trace*   BACTERIA None   NITRITE Negative   LEUKOCYTESUR Negative   UROBILINOGEN Negative     Recent Labs   Lab 01/15/23  0120   COLORU Yellow   SPECGRAV 1.015   PHUR 7.0   PROTEINUA Trace*   BACTERIA None       Micro  Microbiology Results (last 7 days)       Procedure Component Value Units Date/Time    Clostridium difficile EIA [[id number]] Collected: 01/14/23 0241    Order Status: Sent Specimen: Stool Updated: 01/14/23 0336             ABG  Recent Labs   Lab 01/14/23  0243 01/14/23  0842 01/14/23  1207   PH 7.215* 7.142* 7.304*   PCO2 37.5 49.2* 26.1*   PO2 33* 160* 95   HCO3 15.2* 16.8* 12.9*   POCSATURATED 52* 99 97   BE -13 -12 -13         Imaging           Assessment/Plan:  Lily Green is a 72 y.o. female patient w/ PMHx of diabetes type 2 (treated with metformin), HTN, and celiac disease (following a strict diet)     Here with   1. Shock  2. ischemic bowel/ acute abdomen     Neuro/Psych  #Fall with head trauma- Concussion  Appears lethargic, slightly confused, but does answer questions  appropriately  H/o of frequent falls  Home Lyrica recently increased.  CTH unremarkable  - Continue to monitor     CV  #Shock  Poss septic, LA 7 > 6.8  Responsive to fluids  Pressor requirements decreasing  - Will continue aggressive fluid resuscitation  - Will continue abx  - IV pressor support prn   - F/u echo  - Trend lactate        Pulm  On RA no issues        FEN/GI  #Ischemic bowel  #Acute Abdomen  Although abdomen non concerning, imaging concerning  Also elevated LA concerning for ischemic bowel  Amylase greatly elevated pointing to ischemia  CT ab/pel revealing distention of the colon with a large amount of stool and fluid, and a segment of the colon that appeared narrowed with possible thickening of the wall, strongly suspected to be a colonic mass  Surgery s/p colon resection revealed no evidence of mass  No evidence of prior CV disease   Etiology unknown  Bladder pressure wnl    Gen surg consulted  - S/p bowel resection, tolerated well, will f/u with formal report  - Diet: Clear liquids, gluten free, low sugar, no artificial sweeteners   - Keep Mg 2, K 4  - Zofran prn for n/v   - F/u Gen surg recs     #Pancreatitis  Repeat Lipase   Will investigate if potential pancreatitis component    #hypocalcemia  Stable, monitor        RENAL  #HINA  Likely 2/2 to shock  BUN/Cr: 58/1.7 > 60/1.9  UOP: minimal despite aggressive IVF  - HD on 1/14/23  -  in 24 hr  Plan  Strict I&Os  Avoid renal toxic meds   Continue to monitor renal status and urine output   Repeat HD 1/16/23       Heme  Status post bowel resection  H/H down from 17.2/50.7 on admit  10/27.9  WBC increased 13 >18 monitor  PT/INR 16.4/1.6  DVT prophylaxis: lovenox 30mg         Endo  #possible starvation ketoacidosis  #T2DM   Elevated Bgs, AG, and slight B-hydrox  - Supportive management  - Maintain glucose 140-180  - IVF prn  - SSI and long acting  - Holding AM levemir in setting of repeat low BG  - F/u BMP      ID  Diarrhea concerning for infectious  etiology  - Stool smells like C-diff, will empirically treat  - Stool Samples ordered, f/u  - Stop oral vanc, will continue metro and Cef  - DC cef tomorrow  - Blood culture ordered           DVT PPx: SCD, transition to Lovenox when appropriate  Diet: NPO  GI PPX: None  Deconditioning: PT/OT  Code Status: Full        Dispo  - S/p bowel resection, Will f/u with surg recs. Continue current management for now      1/15/2023 Bal Burr M.D., -[facility]  [facility]

## 2023-01-15 NOTE — PROGRESS NOTES
Progress Note  General Surgery    Admit Date: 1/13/2023  S/P: Procedure(s) (LRB):  LAPAROTOMY, EXPLORATORY (N/A)    Post-operative Day: 1 Day Post-Op    Hospital Day: 3    SUBJECTIVE:     No acute events overnight. Patient states pain is preop pain is gone.  Extubated, less pressors, some urine s/p HD  OBJECTIVE:     Vital Signs (Most Recent)  Temp: 97.9 °F (36.6 °C) (01/15/23 0301)  Pulse: 71 (01/15/23 0330)  Resp: 18 (01/15/23 0330)  BP: (!) 99/54 (01/15/23 0301)  SpO2: 100 % (01/15/23 0330)    Vital Signs Range (Last 24H):  Temp:  [97.9 °F (36.6 °C)-100.3 °F (37.9 °C)]   Pulse:  [71-96]   Resp:  [16-41]   BP: ()/(50-69)   SpO2:  [92 %-100 %]   Arterial Line BP: ()/(41-68)     I & O (Last 24H):  Intake/Output Summary (Last 24 hours) at 1/15/2023 0407  Last data filed at 1/15/2023 0300  Gross per 24 hour   Intake 4409.23 ml   Output 635 ml   Net 3774.23 ml       Physical Exam:  Gen: NAD   HEENT: NCAT  Pulm: unlabored, symmetrical   Abd: Soft, attp. No rebound, guarding. Ostomy pink , no output    Wound/Incision:  clean, dry, intact, dressing in place    ASSESSMENT/PLAN:     Assessment/Plan:  DC NGT  Continue IV abx  Ok to get out of bed  Ok for clears, meds    Manuel Javier M.D., F.A.C.S.  [id number] and General Surgery  [facility]

## 2023-01-15 NOTE — PROGRESS NOTES
Pulmonary & Critical Care Medicine Progress Note    Subjective:   Patient had an eventful day yesterday undergoing a colectomy, trialysis placed and received HD. Extubated yesterday afternoon and weaned off pressors this morning! Complaining of mild abdominal pain which is positional    Past medical, surgical, family, and social history from initial consult was reviewed and verified during today's visit.     Vital Signs:   Vitals:    01/15/23 1100   BP: (!) 95/58   Pulse: 77   Resp: 18   Temp:        Fluid Balance:     Intake/Output Summary (Last 24 hours) at 1/15/2023 1201  Last data filed at 1/15/2023 1000  Gross per 24 hour   Intake 1194.77 ml   Output 715 ml   Net 479.77 ml       Review of Systems:   A comprehensive 12-point review of systems was performed, and is negative except for those items mentioned above in the HPI section of this note.     Physical Exam:   General: NAD, cooperative & interactive.  HEENT: AT/NC, EOMI  Neck: Supple without JVD or palpable LAD.   Cardiac: RRR with no MRG with brisk cap refill and symmetric pulses in distal extremities.  Respiratory: Normal inspection. Symmetric chest rise. Normal palpation and percussion. Auscultation clear bilaterally. No increased work of breathing noted.   Abdomen: Soft, midline incision and ostomy c/d/i  Extremities: No edema.   Neuro: Grossly intact to brief exam. Oriented x3 with appropriate mood/affect to situation.     Personal Review and Summary of Interval Diagnostics    Laboratory Studies:   Recent Labs   Lab 01/14/23  1207   PH 7.304*   PCO2 26.1*   PO2 95   HCO3 12.9*   POCSATURATED 97   BE -13     Recent Labs   Lab 01/15/23  0331   WBC 18.41*   RBC 3.21*   HGB 10.0*   HCT 27.9*      MCV 87   MCH 31.2*   MCHC 35.8     Recent Labs   Lab 01/15/23  0331   *   K 4.7      CO2 23   BUN 39*   CREATININE 2.4*   MG 2.7*       Microbiology Data:   Microbiology Results (last 7 days)       Procedure Component Value Units Date/Time     Clostridium difficile EIA [[id number]] Collected: 01/14/23 0241    Order Status: Sent Specimen: Stool Updated: 01/14/23 0336          Chest Imaging:   CXR with appropriately positioned CVC    Infusions:     NORepinephrine bitartrate-D5W Stopped (01/15/23 2908)       Scheduled Medications:    ceFEPime (MAXIPIME) IVPB  2 g Intravenous Q24H    chlorhexidine  15 mL Mouth/Throat BID    enoxaparin  30 mg Subcutaneous Daily    famotidine (PF)  20 mg Intravenous Daily    fludrocortisone  100 mcg Oral Daily    hydrocortisone sodium succinate  50 mg Intravenous Q6H    insulin detemir U-100  10 Units Subcutaneous BID    metronidazole  500 mg Intravenous Q8H    mupirocin   Nasal BID       PRN Medications:   sodium chloride, acetaminophen, dextrose 10%, dicyclomine, glucagon (human recombinant), HYDROmorphone, insulin aspart U-100, LIDOcaine, melatonin, sodium chloride 0.9%, sodium chloride 0.9%    Impression & Recommendations    Lily Green is a 72 y.o. female with Celiac's Disease, GERD, DDD, HTN, MARIXA/MDD who presented after a fall after 3-5 days of fatigue, weakness, and lethargy. Found to have dilated colon, taken for ex lap and eventually total colectomy.     Neuro/Psych:  Acute Encephalopathy  Resolved     Mild TBI  - Patient fell at home and hit her head, was intermittently confused at first, although may have been due to metabolic encephalopathy  Monitor  - CTH was unremarkable     Degenerative Disc Disease  - hold lyrica     Cardiovascular/Hemodynamics:  Shock - resolved  - suspected 2/2 septic shock from ischemic bowel  - Was initially on 2 pressors, weaned off 1/15 am  - Continue IV flagyl for now. Stopped IV vanc, stop PO vanc, continue cefepime, may DC tomorrow pending cultures  - Started on stress dose steroids, quickly weaned off pressors, discontinue 1/16 if no worsening due to concerns with wound healing and hyperglycemia  - Formal TTE pending       Respiratory:   Acute Hypoxemic Respiratory  Failure  - Resolved,SpO2 % on RA     GI/FEN:  F: +6L (3700 here + 2300 from OSH). Minimal UOP, manage with dialysis  E: K>4, Mg>2  N: Clear liquid diet, advance as per Gen surg     Acute Pancreatitis  Concerns for Acute Abdomen from Colonic Dilatation/Obstruction  Toxic megacolon  - S/P total colectomy with end ileostomy on 1/14/22  - Etiology unclear, c diff pending     Transaminitis  - suspected 2/2 shock + congestion + acute illness  - monitor, improving     ID:   Septic shock  - Improving, off pressors. Likely 2/2 toxic megacolon although etiology remains a little unclear (ischemic, IBD, c diff, colon CA, CMV)  - On cefepime/flagyl  - Cultures/c diff pending     Renal:   ATN/HINA  Lactic Acidosis  Metabolic Acidosis  - Cr baseline 0.4 - 0.6  s/p bradford placement, no evidence of obstruction  - Suspect ATN from shock  - Trialysis placed and s/p HD on 1/14/23  - Minimal UOP on 1/15 but no dialysis indications  - Continue to monitor, nephrology following  - Avoid nephrotoxic, strict I/O       Heme/Onc:   Elevated INR  - 1 unit of FFP in preparation for surgery  - Recheck INR in AM (pending)     Endocrine:  T2DM  - Glucose goal 140-180  - Received levemir 1/14 but now hypoglycemic   - SSI + Accuchecks for now     TSH Normal    F: Clears and patient's own special features  A: Tylenol, add oxy, dilaudid  S: none  T: Will change lovenox to heparin given dialysis requiring  H: Elevated  U: Not indicated but famotidine per pt preference  G: Below goal, hold insulin  S: NA  B: None yet  I: LIJ triple lumen - can remove. A line - can remove. RIJ trialysis - keep   D: Stopping PO vanc, IV vanc stopped, on cefepime/IV flagyl. Stop cefepime tomorrow pending cx    Donna Esparza MD  Pulmonary and Critical Care Fellow  01/15/2023  12:01 PM

## 2023-01-15 NOTE — PROGRESS NOTES
01/14/23 1828   Vital Signs   Temp 98.7 °F (37.1 °C)   Temp src Oral   Pulse 72   Heart Rate Source Monitor   Resp (!) 22   SpO2 100 %   Pulse Oximetry Type Continuous   Art Line   Arterial Line /57   During Hemodialysis Assessment   Blood Flow Rate (mL/min) 250 mL/min   Dialysate Flow Rate (mL/min) 500 ml/min   Ultrafiltration Rate (mL/Hr) 167 mL/Hr   Arteriovenous Lines Secure Yes   Arterial Pressure (mmHg) -78 mmHg   Venous Pressure (mmHg) 61   Blood Volume Processed (Liters) 45 L   UF Removed (mL) 500 mL   TMP 3   Venous Line in Air Detector Yes   Transducer Dry Yes   Access Visible Yes   Intra-Hemodialysis Comments HD completed   Post-Hemodialysis Assessment   Rinseback Volume (mL) 250 mL   Blood Volume Processed (Liters) 45 L   Dialyzer Clearance Clear   Duration of Treatment 180 minutes   Additional Fluid Intake (mL) 250 mL   Total UF (mL) 500 mL   Net Fluid Removal 0   Patient Response to Treatment tolerated   Post-Hemodialysis Comments post HD report given to primary nurse

## 2023-01-15 NOTE — NURSING
PT BG 56, drank 4oz of juice. Will recheck BG. Levemir held at this time. Will notify MD.     Reported BG face-to-face to Dr Esparza, agreeable to plan to hold insulin.

## 2023-01-16 ENCOUNTER — ANESTHESIA (OUTPATIENT)
Dept: SURGERY | Facility: HOSPITAL | Age: 73
DRG: 853 | End: 2023-01-16
Payer: MEDICARE

## 2023-01-16 ENCOUNTER — ANESTHESIA EVENT (OUTPATIENT)
Dept: SURGERY | Facility: HOSPITAL | Age: 73
DRG: 853 | End: 2023-01-16
Payer: MEDICARE

## 2023-01-16 LAB
ALBUMIN SERPL BCP-MCNC: 1.7 G/DL (ref 3.5–5.2)
ALBUMIN SERPL BCP-MCNC: 2.2 G/DL (ref 3.5–5.2)
ALBUMIN SERPL BCP-MCNC: 2.7 G/DL (ref 3.5–5.2)
ALLENS TEST: ABNORMAL
ALP SERPL-CCNC: 101 U/L (ref 55–135)
ALT SERPL W/O P-5'-P-CCNC: 97 U/L (ref 10–44)
ANION GAP SERPL CALC-SCNC: 12 MMOL/L (ref 8–16)
ANION GAP SERPL CALC-SCNC: 12 MMOL/L (ref 8–16)
ANION GAP SERPL CALC-SCNC: 14 MMOL/L (ref 8–16)
ANION GAP SERPL CALC-SCNC: 19 MMOL/L (ref 8–16)
ANISOCYTOSIS BLD QL SMEAR: SLIGHT
ANISOCYTOSIS BLD QL SMEAR: SLIGHT
AST SERPL-CCNC: 145 U/L (ref 10–40)
BASOPHILS # BLD AUTO: 0.1 K/UL (ref 0–0.2)
BASOPHILS NFR BLD: 0 % (ref 0–1.9)
BASOPHILS NFR BLD: 0.6 % (ref 0–1.9)
BILIRUB SERPL-MCNC: 0.6 MG/DL (ref 0.1–1)
BLD PROD TYP BPU: NORMAL
BLOOD UNIT EXPIRATION DATE: NORMAL
BLOOD UNIT TYPE CODE: 600
BLOOD UNIT TYPE CODE: 6200
BLOOD UNIT TYPE: NORMAL
BUN SERPL-MCNC: 59 MG/DL (ref 8–23)
BUN SERPL-MCNC: 62 MG/DL (ref 8–23)
BUN SERPL-MCNC: 67 MG/DL (ref 8–23)
BUN SERPL-MCNC: 68 MG/DL (ref 8–23)
BURR CELLS BLD QL SMEAR: ABNORMAL
C DIFF GDH STL QL: NEGATIVE
C DIFF TOX A+B STL QL IA: NEGATIVE
CALCIUM SERPL-MCNC: 6.9 MG/DL (ref 8.7–10.5)
CALCIUM SERPL-MCNC: 7.2 MG/DL (ref 8.7–10.5)
CALCIUM SERPL-MCNC: 7.3 MG/DL (ref 8.7–10.5)
CALCIUM SERPL-MCNC: 7.5 MG/DL (ref 8.7–10.5)
CHLORIDE SERPL-SCNC: 92 MMOL/L (ref 95–110)
CHLORIDE SERPL-SCNC: 94 MMOL/L (ref 95–110)
CHLORIDE SERPL-SCNC: 95 MMOL/L (ref 95–110)
CHLORIDE SERPL-SCNC: 98 MMOL/L (ref 95–110)
CO2 SERPL-SCNC: 12 MMOL/L (ref 23–29)
CO2 SERPL-SCNC: 16 MMOL/L (ref 23–29)
CO2 SERPL-SCNC: 17 MMOL/L (ref 23–29)
CO2 SERPL-SCNC: 18 MMOL/L (ref 23–29)
CODING SYSTEM: NORMAL
CREAT SERPL-MCNC: 3.5 MG/DL (ref 0.5–1.4)
CREAT SERPL-MCNC: 4 MG/DL (ref 0.5–1.4)
CREAT SERPL-MCNC: 4.1 MG/DL (ref 0.5–1.4)
CREAT SERPL-MCNC: 4.1 MG/DL (ref 0.5–1.4)
DACRYOCYTES BLD QL SMEAR: ABNORMAL
DIFFERENTIAL METHOD: ABNORMAL
DISPENSE STATUS: NORMAL
EOSINOPHIL # BLD AUTO: 0 K/UL (ref 0–0.5)
EOSINOPHIL NFR BLD: 0 % (ref 0–8)
ERYTHROCYTE [DISTWIDTH] IN BLOOD BY AUTOMATED COUNT: 12.3 % (ref 11.5–14.5)
ERYTHROCYTE [DISTWIDTH] IN BLOOD BY AUTOMATED COUNT: 12.4 % (ref 11.5–14.5)
ERYTHROCYTE [DISTWIDTH] IN BLOOD BY AUTOMATED COUNT: 13.2 % (ref 11.5–14.5)
ERYTHROCYTE [DISTWIDTH] IN BLOOD BY AUTOMATED COUNT: 15 % (ref 11.5–14.5)
ERYTHROCYTE [DISTWIDTH] IN BLOOD BY AUTOMATED COUNT: 18.8 % (ref 11.5–14.5)
EST. GFR  (NO RACE VARIABLE): 11 ML/MIN/1.73 M^2
EST. GFR  (NO RACE VARIABLE): 13 ML/MIN/1.73 M^2
GIANT PLATELETS BLD QL SMEAR: PRESENT
GIANT PLATELETS BLD QL SMEAR: PRESENT
GLUCOSE SERPL-MCNC: 170 MG/DL (ref 70–110)
GLUCOSE SERPL-MCNC: 185 MG/DL (ref 70–110)
GLUCOSE SERPL-MCNC: 195 MG/DL (ref 70–110)
GLUCOSE SERPL-MCNC: 203 MG/DL (ref 70–110)
HCO3 UR-SCNC: 15.8 MMOL/L (ref 24–28)
HCT VFR BLD AUTO: 16.9 % (ref 37–48.5)
HCT VFR BLD AUTO: 17.3 % (ref 37–48.5)
HCT VFR BLD AUTO: 26.8 % (ref 37–48.5)
HCT VFR BLD AUTO: 29.5 % (ref 37–48.5)
HCT VFR BLD AUTO: ABNORMAL % (ref 37–48.5)
HGB BLD-MCNC: 10 G/DL (ref 12–16)
HGB BLD-MCNC: 10.3 G/DL (ref 12–16)
HGB BLD-MCNC: 11.2 G/DL (ref 12–16)
HGB BLD-MCNC: 11.2 G/DL (ref 12–16)
HGB BLD-MCNC: 6.2 G/DL (ref 12–16)
HGB BLD-MCNC: 6.3 G/DL (ref 12–16)
HGB BLD-MCNC: 7.6 G/DL (ref 12–16)
HGB BLD-MCNC: 9.3 G/DL (ref 12–16)
HGB BLD-MCNC: 9.3 G/DL (ref 12–16)
HYPOCHROMIA BLD QL SMEAR: ABNORMAL
IMM GRANULOCYTES # BLD AUTO: 0.14 K/UL (ref 0–0.04)
IMM GRANULOCYTES # BLD AUTO: ABNORMAL K/UL (ref 0–0.04)
IMM GRANULOCYTES NFR BLD AUTO: 0.8 % (ref 0–0.5)
IMM GRANULOCYTES NFR BLD AUTO: ABNORMAL % (ref 0–0.5)
INR PPP: 1.6 (ref 0.8–1.2)
INR PPP: 1.6 (ref 0.8–1.2)
LACTATE SERPL-SCNC: 1.5 MMOL/L (ref 0.5–2.2)
LACTATE SERPL-SCNC: 1.8 MMOL/L (ref 0.5–2.2)
LACTATE SERPL-SCNC: >12 MMOL/L (ref 0.5–2.2)
LDH SERPL L TO P-CCNC: 1278 U/L (ref 110–260)
LYMPHOCYTES # BLD AUTO: 1 K/UL (ref 1–4.8)
LYMPHOCYTES NFR BLD: 3 % (ref 18–48)
LYMPHOCYTES NFR BLD: 5 % (ref 18–48)
LYMPHOCYTES NFR BLD: 5.8 % (ref 18–48)
LYMPHOCYTES NFR BLD: 6 % (ref 18–48)
LYMPHOCYTES NFR BLD: 7 % (ref 18–48)
MAGNESIUM SERPL-MCNC: 2.7 MG/DL (ref 1.6–2.6)
MAGNESIUM SERPL-MCNC: 2.9 MG/DL (ref 1.6–2.6)
MCH RBC QN AUTO: 27.4 PG (ref 27–31)
MCH RBC QN AUTO: 30.5 PG (ref 27–31)
MCH RBC QN AUTO: 30.6 PG (ref 27–31)
MCH RBC QN AUTO: 31.5 PG (ref 27–31)
MCH RBC QN AUTO: 31.8 PG (ref 27–31)
MCHC RBC AUTO-ENTMCNC: 34.7 G/DL (ref 32–36)
MCHC RBC AUTO-ENTMCNC: 34.9 G/DL (ref 32–36)
MCHC RBC AUTO-ENTMCNC: 35.5 G/DL (ref 32–36)
MCHC RBC AUTO-ENTMCNC: 36.4 G/DL (ref 32–36)
MCHC RBC AUTO-ENTMCNC: 36.7 G/DL (ref 32–36)
MCV RBC AUTO: 79 FL (ref 82–98)
MCV RBC AUTO: 86 FL (ref 82–98)
MCV RBC AUTO: 87 FL (ref 82–98)
MCV RBC AUTO: 87 FL (ref 82–98)
MCV RBC AUTO: 88 FL (ref 82–98)
MONOCYTES # BLD AUTO: 1.7 K/UL (ref 0.3–1)
MONOCYTES NFR BLD: 2 % (ref 4–15)
MONOCYTES NFR BLD: 4 % (ref 4–15)
MONOCYTES NFR BLD: 6 % (ref 4–15)
MONOCYTES NFR BLD: 7 % (ref 4–15)
MONOCYTES NFR BLD: 9.7 % (ref 4–15)
NEUTROPHILS # BLD AUTO: 14.2 K/UL (ref 1.8–7.7)
NEUTROPHILS NFR BLD: 69 % (ref 38–73)
NEUTROPHILS NFR BLD: 71 % (ref 38–73)
NEUTROPHILS NFR BLD: 83.1 % (ref 38–73)
NEUTROPHILS NFR BLD: 87 % (ref 38–73)
NEUTROPHILS NFR BLD: 87 % (ref 38–73)
NEUTS BAND NFR BLD MANUAL: 20 %
NEUTS BAND NFR BLD MANUAL: 26 %
NRBC BLD-RTO: 0 /100 WBC
NUM UNITS TRANS PACKED RBC: NORMAL
OVALOCYTES BLD QL SMEAR: ABNORMAL
OVALOCYTES BLD QL SMEAR: ABNORMAL
PCO2 BLDA: 30.7 MMHG (ref 35–45)
PH SMN: 7.32 [PH] (ref 7.35–7.45)
PHOSPHATE SERPL-MCNC: 11.7 MG/DL (ref 2.7–4.5)
PHOSPHATE SERPL-MCNC: 6.2 MG/DL (ref 2.7–4.5)
PHOSPHATE SERPL-MCNC: 8.5 MG/DL (ref 2.7–4.5)
PLATELET # BLD AUTO: 137 K/UL (ref 150–450)
PLATELET # BLD AUTO: 154 K/UL (ref 150–450)
PLATELET # BLD AUTO: 154 K/UL (ref 150–450)
PLATELET # BLD AUTO: 162 K/UL (ref 150–450)
PLATELET # BLD AUTO: 163 K/UL (ref 150–450)
PLATELET BLD QL SMEAR: ABNORMAL
PMV BLD AUTO: 11.1 FL (ref 9.2–12.9)
PMV BLD AUTO: 11.2 FL (ref 9.2–12.9)
PMV BLD AUTO: 11.2 FL (ref 9.2–12.9)
PMV BLD AUTO: 12 FL (ref 9.2–12.9)
PMV BLD AUTO: 9.8 FL (ref 9.2–12.9)
PO2 BLDA: 74 MMHG (ref 80–100)
POC BE: -10 MMOL/L
POC SATURATED O2: 94 % (ref 95–100)
POC TCO2: 17 MMOL/L (ref 23–27)
POCT GLUCOSE: 169 MG/DL (ref 70–110)
POCT GLUCOSE: 192 MG/DL (ref 70–110)
POCT GLUCOSE: 194 MG/DL (ref 70–110)
POCT GLUCOSE: 197 MG/DL (ref 70–110)
POCT GLUCOSE: 211 MG/DL (ref 70–110)
POCT GLUCOSE: 213 MG/DL (ref 70–110)
POIKILOCYTOSIS BLD QL SMEAR: SLIGHT
POLYCHROMASIA BLD QL SMEAR: ABNORMAL
POLYCHROMASIA BLD QL SMEAR: ABNORMAL
POTASSIUM SERPL-SCNC: 5.1 MMOL/L (ref 3.5–5.1)
POTASSIUM SERPL-SCNC: 5.6 MMOL/L (ref 3.5–5.1)
POTASSIUM SERPL-SCNC: 6.1 MMOL/L (ref 3.5–5.1)
POTASSIUM SERPL-SCNC: 6.6 MMOL/L (ref 3.5–5.1)
PROT SERPL-MCNC: 4.3 G/DL (ref 6–8.4)
PROTHROMBIN TIME: 15.9 SEC (ref 9–12.5)
PROTHROMBIN TIME: 16 SEC (ref 9–12.5)
RBC # BLD AUTO: 1.95 M/UL (ref 4–5.4)
RBC # BLD AUTO: 2 M/UL (ref 4–5.4)
RBC # BLD AUTO: 2.49 M/UL (ref 4–5.4)
RBC # BLD AUTO: 3.04 M/UL (ref 4–5.4)
RBC # BLD AUTO: 3.76 M/UL (ref 4–5.4)
SAMPLE: ABNORMAL
SITE: ABNORMAL
SODIUM SERPL-SCNC: 123 MMOL/L (ref 136–145)
SODIUM SERPL-SCNC: 124 MMOL/L (ref 136–145)
SODIUM SERPL-SCNC: 124 MMOL/L (ref 136–145)
SODIUM SERPL-SCNC: 128 MMOL/L (ref 136–145)
UNIT NUMBER: NORMAL
UNIT NUMBER: NORMAL
WBC # BLD AUTO: 17.13 K/UL (ref 3.9–12.7)
WBC # BLD AUTO: 25.52 K/UL (ref 3.9–12.7)
WBC # BLD AUTO: 26.1 K/UL (ref 3.9–12.7)
WBC # BLD AUTO: 27.9 K/UL (ref 3.9–12.7)
WBC # BLD AUTO: 29.67 K/UL (ref 3.9–12.7)

## 2023-01-16 PROCEDURE — 85027 COMPLETE CBC AUTOMATED: CPT | Mod: 91 | Performed by: STUDENT IN AN ORGANIZED HEALTH CARE EDUCATION/TRAINING PROGRAM

## 2023-01-16 PROCEDURE — 85018 HEMOGLOBIN: CPT | Mod: 91 | Performed by: INTERNAL MEDICINE

## 2023-01-16 PROCEDURE — 37799 UNLISTED PX VASCULAR SURGERY: CPT

## 2023-01-16 PROCEDURE — 90945 DIALYSIS ONE EVALUATION: CPT

## 2023-01-16 PROCEDURE — 27000221 HC OXYGEN, UP TO 24 HOURS

## 2023-01-16 PROCEDURE — 25000003 PHARM REV CODE 250: Performed by: SURGERY

## 2023-01-16 PROCEDURE — 25000003 PHARM REV CODE 250: Performed by: INTERNAL MEDICINE

## 2023-01-16 PROCEDURE — 88307 TISSUE EXAM BY PATHOLOGIST: CPT | Performed by: PATHOLOGY

## 2023-01-16 PROCEDURE — D9220A PRA ANESTHESIA: Mod: ANES,,, | Performed by: ANESTHESIOLOGY

## 2023-01-16 PROCEDURE — 85007 BL SMEAR W/DIFF WBC COUNT: CPT | Performed by: STUDENT IN AN ORGANIZED HEALTH CARE EDUCATION/TRAINING PROGRAM

## 2023-01-16 PROCEDURE — P9035 PLATELET PHERES LEUKOREDUCED: HCPCS | Performed by: INTERNAL MEDICINE

## 2023-01-16 PROCEDURE — 63600175 PHARM REV CODE 636 W HCPCS: Performed by: NURSE ANESTHETIST, CERTIFIED REGISTERED

## 2023-01-16 PROCEDURE — 36000709 HC OR TIME LEV III EA ADD 15 MIN: Performed by: SURGERY

## 2023-01-16 PROCEDURE — 27201247 HC HEMODIALYSIS, SET-UP & CANCEL

## 2023-01-16 PROCEDURE — 84100 ASSAY OF PHOSPHORUS: CPT | Performed by: STUDENT IN AN ORGANIZED HEALTH CARE EDUCATION/TRAINING PROGRAM

## 2023-01-16 PROCEDURE — 83735 ASSAY OF MAGNESIUM: CPT | Mod: 91 | Performed by: STUDENT IN AN ORGANIZED HEALTH CARE EDUCATION/TRAINING PROGRAM

## 2023-01-16 PROCEDURE — 82803 BLOOD GASES ANY COMBINATION: CPT

## 2023-01-16 PROCEDURE — 25000242 PHARM REV CODE 250 ALT 637 W/ HCPCS: Performed by: NURSE ANESTHETIST, CERTIFIED REGISTERED

## 2023-01-16 PROCEDURE — 63600175 PHARM REV CODE 636 W HCPCS: Performed by: INTERNAL MEDICINE

## 2023-01-16 PROCEDURE — D9220A PRA ANESTHESIA: ICD-10-PCS | Mod: CRNA,,, | Performed by: NURSE ANESTHETIST, CERTIFIED REGISTERED

## 2023-01-16 PROCEDURE — P9045 ALBUMIN (HUMAN), 5%, 250 ML: HCPCS | Mod: JG | Performed by: NURSE ANESTHETIST, CERTIFIED REGISTERED

## 2023-01-16 PROCEDURE — P9016 RBC LEUKOCYTES REDUCED: HCPCS | Performed by: STUDENT IN AN ORGANIZED HEALTH CARE EDUCATION/TRAINING PROGRAM

## 2023-01-16 PROCEDURE — 80069 RENAL FUNCTION PANEL: CPT | Performed by: STUDENT IN AN ORGANIZED HEALTH CARE EDUCATION/TRAINING PROGRAM

## 2023-01-16 PROCEDURE — P9016 RBC LEUKOCYTES REDUCED: HCPCS | Performed by: INTERNAL MEDICINE

## 2023-01-16 PROCEDURE — 85025 COMPLETE CBC W/AUTO DIFF WBC: CPT | Performed by: FAMILY MEDICINE

## 2023-01-16 PROCEDURE — 85027 COMPLETE CBC AUTOMATED: CPT | Mod: 91 | Performed by: FAMILY MEDICINE

## 2023-01-16 PROCEDURE — 63600175 PHARM REV CODE 636 W HCPCS: Performed by: SURGERY

## 2023-01-16 PROCEDURE — 44314 PR REVISION OF ILEOSTOMY,COMPLICATED: ICD-10-PCS | Mod: 78,,, | Performed by: SURGERY

## 2023-01-16 PROCEDURE — P9017 PLASMA 1 DONOR FRZ W/IN 8 HR: HCPCS | Performed by: INTERNAL MEDICINE

## 2023-01-16 PROCEDURE — 85610 PROTHROMBIN TIME: CPT | Performed by: STUDENT IN AN ORGANIZED HEALTH CARE EDUCATION/TRAINING PROGRAM

## 2023-01-16 PROCEDURE — 80053 COMPREHEN METABOLIC PANEL: CPT | Performed by: STUDENT IN AN ORGANIZED HEALTH CARE EDUCATION/TRAINING PROGRAM

## 2023-01-16 PROCEDURE — D9220A PRA ANESTHESIA: Mod: CRNA,,, | Performed by: NURSE ANESTHETIST, CERTIFIED REGISTERED

## 2023-01-16 PROCEDURE — D9220A PRA ANESTHESIA: ICD-10-PCS | Mod: ANES,,, | Performed by: ANESTHESIOLOGY

## 2023-01-16 PROCEDURE — 99900035 HC TECH TIME PER 15 MIN (STAT)

## 2023-01-16 PROCEDURE — 37000008 HC ANESTHESIA 1ST 15 MINUTES: Performed by: SURGERY

## 2023-01-16 PROCEDURE — 63600175 PHARM REV CODE 636 W HCPCS: Performed by: STUDENT IN AN ORGANIZED HEALTH CARE EDUCATION/TRAINING PROGRAM

## 2023-01-16 PROCEDURE — 25500020 PHARM REV CODE 255: Performed by: FAMILY MEDICINE

## 2023-01-16 PROCEDURE — 85610 PROTHROMBIN TIME: CPT | Mod: 91 | Performed by: SURGERY

## 2023-01-16 PROCEDURE — 85027 COMPLETE CBC AUTOMATED: CPT | Performed by: STUDENT IN AN ORGANIZED HEALTH CARE EDUCATION/TRAINING PROGRAM

## 2023-01-16 PROCEDURE — 44314 REVISION OF ILEOSTOMY: CPT | Mod: 78,,, | Performed by: SURGERY

## 2023-01-16 PROCEDURE — 27201423 OPTIME MED/SURG SUP & DEVICES STERILE SUPPLY: Performed by: SURGERY

## 2023-01-16 PROCEDURE — 83615 LACTATE (LD) (LDH) ENZYME: CPT | Performed by: INTERNAL MEDICINE

## 2023-01-16 PROCEDURE — 88307 PR  SURG PATH,LEVEL V: ICD-10-PCS | Mod: 26,,, | Performed by: PATHOLOGY

## 2023-01-16 PROCEDURE — P9016 RBC LEUKOCYTES REDUCED: HCPCS

## 2023-01-16 PROCEDURE — 20000000 HC ICU ROOM

## 2023-01-16 PROCEDURE — 85007 BL SMEAR W/DIFF WBC COUNT: CPT | Mod: 91 | Performed by: STUDENT IN AN ORGANIZED HEALTH CARE EDUCATION/TRAINING PROGRAM

## 2023-01-16 PROCEDURE — 25000003 PHARM REV CODE 250: Performed by: NURSE ANESTHETIST, CERTIFIED REGISTERED

## 2023-01-16 PROCEDURE — 80048 BASIC METABOLIC PNL TOTAL CA: CPT | Mod: XB | Performed by: FAMILY MEDICINE

## 2023-01-16 PROCEDURE — 25000003 PHARM REV CODE 250: Performed by: STUDENT IN AN ORGANIZED HEALTH CARE EDUCATION/TRAINING PROGRAM

## 2023-01-16 PROCEDURE — 27202415 HC CARTRIDGE, CRRT

## 2023-01-16 PROCEDURE — S0030 INJECTION, METRONIDAZOLE: HCPCS | Performed by: SURGERY

## 2023-01-16 PROCEDURE — 83735 ASSAY OF MAGNESIUM: CPT | Performed by: STUDENT IN AN ORGANIZED HEALTH CARE EDUCATION/TRAINING PROGRAM

## 2023-01-16 PROCEDURE — 83605 ASSAY OF LACTIC ACID: CPT | Performed by: INTERNAL MEDICINE

## 2023-01-16 PROCEDURE — 37000009 HC ANESTHESIA EA ADD 15 MINS: Performed by: SURGERY

## 2023-01-16 PROCEDURE — 85007 BL SMEAR W/DIFF WBC COUNT: CPT | Mod: 91 | Performed by: FAMILY MEDICINE

## 2023-01-16 PROCEDURE — 36000708 HC OR TIME LEV III 1ST 15 MIN: Performed by: SURGERY

## 2023-01-16 PROCEDURE — 88307 TISSUE EXAM BY PATHOLOGIST: CPT | Mod: 26,,, | Performed by: PATHOLOGY

## 2023-01-16 RX ORDER — HYDROCODONE BITARTRATE AND ACETAMINOPHEN 500; 5 MG/1; MG/1
TABLET ORAL
Status: DISCONTINUED | OUTPATIENT
Start: 2023-01-16 | End: 2023-01-17

## 2023-01-16 RX ORDER — LIDOCAINE HYDROCHLORIDE 20 MG/ML
INJECTION INTRAVENOUS
Status: DISCONTINUED | OUTPATIENT
Start: 2023-01-16 | End: 2023-01-16

## 2023-01-16 RX ORDER — CALCIUM GLUCONATE 20 MG/ML
INJECTION, SOLUTION INTRAVENOUS
Status: DISPENSED
Start: 2023-01-16 | End: 2023-01-17

## 2023-01-16 RX ORDER — MAGNESIUM SULFATE HEPTAHYDRATE 40 MG/ML
2 INJECTION, SOLUTION INTRAVENOUS
Status: DISCONTINUED | OUTPATIENT
Start: 2023-01-16 | End: 2023-01-17

## 2023-01-16 RX ORDER — ALBUTEROL SULFATE 90 UG/1
AEROSOL, METERED RESPIRATORY (INHALATION)
Status: DISCONTINUED | OUTPATIENT
Start: 2023-01-16 | End: 2023-01-16

## 2023-01-16 RX ORDER — CALCIUM GLUCONATE 20 MG/ML
1 INJECTION, SOLUTION INTRAVENOUS
Status: COMPLETED | OUTPATIENT
Start: 2023-01-16 | End: 2023-01-16

## 2023-01-16 RX ORDER — DEXAMETHASONE SODIUM PHOSPHATE 4 MG/ML
INJECTION, SOLUTION INTRA-ARTICULAR; INTRALESIONAL; INTRAMUSCULAR; INTRAVENOUS; SOFT TISSUE
Status: DISCONTINUED | OUTPATIENT
Start: 2023-01-16 | End: 2023-01-16

## 2023-01-16 RX ORDER — ROCURONIUM BROMIDE 10 MG/ML
INJECTION, SOLUTION INTRAVENOUS
Status: DISCONTINUED | OUTPATIENT
Start: 2023-01-16 | End: 2023-01-16

## 2023-01-16 RX ORDER — NEOSTIGMINE METHYLSULFATE 1 MG/ML
INJECTION, SOLUTION INTRAVENOUS
Status: DISCONTINUED | OUTPATIENT
Start: 2023-01-16 | End: 2023-01-16

## 2023-01-16 RX ORDER — ALBUMIN HUMAN 50 G/1000ML
SOLUTION INTRAVENOUS CONTINUOUS PRN
Status: DISCONTINUED | OUTPATIENT
Start: 2023-01-16 | End: 2023-01-16

## 2023-01-16 RX ORDER — FENTANYL CITRATE 50 UG/ML
INJECTION, SOLUTION INTRAMUSCULAR; INTRAVENOUS
Status: DISCONTINUED | OUTPATIENT
Start: 2023-01-16 | End: 2023-01-16

## 2023-01-16 RX ORDER — MIDAZOLAM HYDROCHLORIDE 1 MG/ML
INJECTION, SOLUTION INTRAMUSCULAR; INTRAVENOUS
Status: DISCONTINUED | OUTPATIENT
Start: 2023-01-16 | End: 2023-01-16

## 2023-01-16 RX ORDER — HYDROMORPHONE HYDROCHLORIDE 1 MG/ML
0.5 INJECTION, SOLUTION INTRAMUSCULAR; INTRAVENOUS; SUBCUTANEOUS ONCE
Status: COMPLETED | OUTPATIENT
Start: 2023-01-16 | End: 2023-01-16

## 2023-01-16 RX ORDER — TRANEXAMIC ACID 100 MG/ML
1000 INJECTION, SOLUTION INTRAVENOUS ONCE
Status: COMPLETED | OUTPATIENT
Start: 2023-01-16 | End: 2023-01-16

## 2023-01-16 RX ORDER — ONDANSETRON 2 MG/ML
INJECTION INTRAMUSCULAR; INTRAVENOUS
Status: DISCONTINUED | OUTPATIENT
Start: 2023-01-16 | End: 2023-01-16

## 2023-01-16 RX ORDER — HYDROCODONE BITARTRATE AND ACETAMINOPHEN 500; 5 MG/1; MG/1
TABLET ORAL CONTINUOUS
Status: DISCONTINUED | OUTPATIENT
Start: 2023-01-16 | End: 2023-01-17

## 2023-01-16 RX ORDER — BUPIVACAINE HYDROCHLORIDE 5 MG/ML
INJECTION, SOLUTION PERINEURAL
Status: DISCONTINUED | OUTPATIENT
Start: 2023-01-16 | End: 2023-01-16 | Stop reason: HOSPADM

## 2023-01-16 RX ORDER — PHENYLEPHRINE HYDROCHLORIDE 10 MG/ML
INJECTION INTRAVENOUS
Status: DISCONTINUED | OUTPATIENT
Start: 2023-01-16 | End: 2023-01-16

## 2023-01-16 RX ORDER — HYDROMORPHONE HYDROCHLORIDE 1 MG/ML
0.5 INJECTION, SOLUTION INTRAMUSCULAR; INTRAVENOUS; SUBCUTANEOUS EVERY 4 HOURS PRN
Status: DISCONTINUED | OUTPATIENT
Start: 2023-01-16 | End: 2023-01-19

## 2023-01-16 RX ORDER — SUCCINYLCHOLINE CHLORIDE 20 MG/ML
INJECTION INTRAMUSCULAR; INTRAVENOUS
Status: DISCONTINUED | OUTPATIENT
Start: 2023-01-16 | End: 2023-01-16

## 2023-01-16 RX ORDER — PROPOFOL 10 MG/ML
VIAL (ML) INTRAVENOUS
Status: DISCONTINUED | OUTPATIENT
Start: 2023-01-16 | End: 2023-01-16

## 2023-01-16 RX ORDER — HYDRALAZINE HYDROCHLORIDE 20 MG/ML
10 INJECTION INTRAMUSCULAR; INTRAVENOUS EVERY 8 HOURS PRN
Status: DISCONTINUED | OUTPATIENT
Start: 2023-01-16 | End: 2023-01-26 | Stop reason: HOSPADM

## 2023-01-16 RX ADMIN — DEXAMETHASONE SODIUM PHOSPHATE 4 MG: 4 INJECTION, SOLUTION INTRA-ARTICULAR; INTRALESIONAL; INTRAMUSCULAR; INTRAVENOUS; SOFT TISSUE at 01:01

## 2023-01-16 RX ADMIN — FAMOTIDINE 20 MG: 10 INJECTION, SOLUTION INTRAVENOUS at 09:01

## 2023-01-16 RX ADMIN — PHENYLEPHRINE HYDROCHLORIDE 100 MCG: 10 INJECTION INTRAVENOUS at 02:01

## 2023-01-16 RX ADMIN — ROCURONIUM BROMIDE 10 MG: 10 INJECTION, SOLUTION INTRAVENOUS at 01:01

## 2023-01-16 RX ADMIN — CALCIUM GLUCONATE 1 G: 20 INJECTION, SOLUTION INTRAVENOUS at 03:01

## 2023-01-16 RX ADMIN — CHLORHEXIDINE GLUCONATE 0.12% ORAL RINSE 15 ML: 1.2 LIQUID ORAL at 09:01

## 2023-01-16 RX ADMIN — HYDROMORPHONE HYDROCHLORIDE 0.2 MG: 1 INJECTION, SOLUTION INTRAMUSCULAR; INTRAVENOUS; SUBCUTANEOUS at 10:01

## 2023-01-16 RX ADMIN — PHENYLEPHRINE HYDROCHLORIDE 100 MCG: 10 INJECTION INTRAVENOUS at 01:01

## 2023-01-16 RX ADMIN — PROPOFOL 120 MG: 10 INJECTION, EMULSION INTRAVENOUS at 01:01

## 2023-01-16 RX ADMIN — CALCIUM GLUCONATE 1 G: 20 INJECTION, SOLUTION INTRAVENOUS at 12:01

## 2023-01-16 RX ADMIN — HYDRALAZINE HYDROCHLORIDE 10 MG: 20 INJECTION INTRAMUSCULAR; INTRAVENOUS at 04:01

## 2023-01-16 RX ADMIN — METRONIDAZOLE 500 MG: 5 INJECTION, SOLUTION INTRAVENOUS at 01:01

## 2023-01-16 RX ADMIN — ALBUTEROL SULFATE 2 PUFF: 90 AEROSOL, METERED RESPIRATORY (INHALATION) at 01:01

## 2023-01-16 RX ADMIN — NOREPINEPHRINE BITARTRATE 0.5 MCG/KG/MIN: 1 INJECTION, SOLUTION, CONCENTRATE INTRAVENOUS at 12:01

## 2023-01-16 RX ADMIN — TRANEXAMIC ACID 1000 MG: 100 INJECTION, SOLUTION INTRAVENOUS at 12:01

## 2023-01-16 RX ADMIN — CHLORHEXIDINE GLUCONATE 0.12% ORAL RINSE 15 ML: 1.2 LIQUID ORAL at 08:01

## 2023-01-16 RX ADMIN — MUPIROCIN: 20 OINTMENT TOPICAL at 09:01

## 2023-01-16 RX ADMIN — SODIUM CHLORIDE: 0.9 INJECTION, SOLUTION INTRAVENOUS at 01:01

## 2023-01-16 RX ADMIN — Medication 500 MG: at 05:01

## 2023-01-16 RX ADMIN — METRONIDAZOLE 500 MG: 5 INJECTION, SOLUTION INTRAVENOUS at 09:01

## 2023-01-16 RX ADMIN — GLYCOPYRROLATE 0.4 MG: 0.2 INJECTION, SOLUTION INTRAMUSCULAR; INTRAVITREAL at 02:01

## 2023-01-16 RX ADMIN — IOHEXOL 130 ML: 350 INJECTION, SOLUTION INTRAVENOUS at 10:01

## 2023-01-16 RX ADMIN — INSULIN DETEMIR 10 UNITS: 100 INJECTION, SOLUTION SUBCUTANEOUS at 09:01

## 2023-01-16 RX ADMIN — ALBUMIN (HUMAN): 12.5 SOLUTION INTRAVENOUS at 02:01

## 2023-01-16 RX ADMIN — VASOPRESSIN 0.04 UNITS/MIN: 20 INJECTION INTRAVENOUS at 10:01

## 2023-01-16 RX ADMIN — METRONIDAZOLE 500 MG: 5 INJECTION, SOLUTION INTRAVENOUS at 04:01

## 2023-01-16 RX ADMIN — NEOSTIGMINE METHYLSULFATE 4 MG: 1 INJECTION INTRAVENOUS at 02:01

## 2023-01-16 RX ADMIN — SUCCINYLCHOLINE CHLORIDE 120 MG: 20 INJECTION, SOLUTION INTRAMUSCULAR; INTRAVENOUS at 01:01

## 2023-01-16 RX ADMIN — CEFEPIME 2 G: 2 INJECTION, POWDER, FOR SOLUTION INTRAVENOUS at 08:01

## 2023-01-16 RX ADMIN — MUPIROCIN: 20 OINTMENT TOPICAL at 08:01

## 2023-01-16 RX ADMIN — FENTANYL CITRATE 100 MCG: 50 INJECTION, SOLUTION INTRAMUSCULAR; INTRAVENOUS at 01:01

## 2023-01-16 RX ADMIN — FLUDROCORTISONE ACETATE 100 MCG: 0.1 TABLET ORAL at 09:01

## 2023-01-16 RX ADMIN — HYDROCODONE BITARTRATE AND ACETAMINOPHEN 1 TABLET: 5; 325 TABLET ORAL at 04:01

## 2023-01-16 RX ADMIN — ONDANSETRON 4 MG: 2 INJECTION, SOLUTION INTRAMUSCULAR; INTRAVENOUS at 02:01

## 2023-01-16 RX ADMIN — HYDROMORPHONE HYDROCHLORIDE 0.5 MG: 1 INJECTION, SOLUTION INTRAMUSCULAR; INTRAVENOUS; SUBCUTANEOUS at 06:01

## 2023-01-16 RX ADMIN — HYDROMORPHONE HYDROCHLORIDE 0.2 MG: 1 INJECTION, SOLUTION INTRAMUSCULAR; INTRAVENOUS; SUBCUTANEOUS at 03:01

## 2023-01-16 RX ADMIN — MIDAZOLAM 2 MG: 1 INJECTION INTRAMUSCULAR; INTRAVENOUS at 01:01

## 2023-01-16 RX ADMIN — ROCURONIUM BROMIDE 30 MG: 10 INJECTION, SOLUTION INTRAVENOUS at 01:01

## 2023-01-16 RX ADMIN — LIDOCAINE HYDROCHLORIDE 75 MG: 20 INJECTION, SOLUTION INTRAVENOUS at 01:01

## 2023-01-16 RX ADMIN — INSULIN ASPART 2 UNITS: 100 INJECTION, SOLUTION INTRAVENOUS; SUBCUTANEOUS at 05:01

## 2023-01-16 RX ADMIN — HYDROCORTISONE SODIUM SUCCINATE 50 MG: 100 INJECTION, POWDER, FOR SOLUTION INTRAMUSCULAR; INTRAVENOUS at 05:01

## 2023-01-16 RX ADMIN — INSULIN DETEMIR 10 UNITS: 100 INJECTION, SOLUTION SUBCUTANEOUS at 08:01

## 2023-01-16 NOTE — PLAN OF CARE
Pt AAOx4, but most recently looks to be developing some delirium. No complaints of nausea, vomiting, or diarrhea. Dilaudid given for pain. Pt became hypotensive and levophed gtt initiated. Critical h/h and 2 units of PRBCs ordered and first unit infusing. Pt did not have any significant drop in blood sugars. June catheter in place with minimal urine output. ML incision is intact. L side colostomy in place with no output. Safety precautions maintained. Tolerated all medications well.

## 2023-01-16 NOTE — CARE UPDATE
Lab at [facility] called with a + blood culture (Gram negative rods). Culture will continue on to determine species and sensitivities.

## 2023-01-16 NOTE — NURSING
Talked to [facility] resident regarding pt's blood glucose and recurring needs for D10 bolus; will place order for continue D5 infusion.

## 2023-01-16 NOTE — OP NOTE
PATIENT: Lily Green    MRN: [id number]    DATE OF PROCEDURE: 01/16/2023    PREOPERATIVE DIAGNOSIS: s/p Total Colectomy with post-op bleeding    POSTOPERATIVE DIAGNOSIS: same    PROCEDURE:   Reopening recent laparotomy with evacuation of intra-abdominal hematoma   Takedown of left lateral Ileostomy and recreation of right lateral end Ileostomy    SURGEON: aMnuel Javier M.D.    ASSISTANT: None    ANESTHESIA: general ETT    ESTIMATED BLOOD LOSS: minimal surgical acute surgical bleeding (significant old blood/clot in abdomen)    SPECIMEN: ileostomy end    CONDITION: guarded    COMPLICATIONS: None    FINDINGS: No major vascular bleeding identified, small area on underside of spleen with slow bleeding, multiple areas of oozing at raw surfaces along colon resection line    INDICATIONS: The patient is a 72 year old white female who underwent an emergency total colectomy proximally 48 hours ago.  Patient was noted to have significant decrease in her hemoglobin and hematocrit this morning with associated drop in blood pressure.  This required re-initiation of vasopressors.  She appropriately responded to blood transfusions in terms of her H&H but continued to require vasopressors.  A CTA was performed which showed a blush in the left upper quadrant.  Interventional Radiology was consulted but the patient was felt to be too unstable.  We elected to take her back to surgery for reopening of laparotomy emergently.  The patient's  and sister were updated, all questions were answered, and they consented for the procedure.    PROCEDURE IN DETAIL:  After surgical consent was obtained, the patient was transported to the operative theater and onto the operating room table in the supine position.  General endotracheal anesthesia was administered without difficulty.  A time-out was performed and the patient was already receiving appropriate perioperative antibiotics.  The ileostomy appliance was removed and the abdomen  was prepped and draped in a standard sterile fashion.  The ileostomy was kept separate from the sterile field with a sterile blue towel.  The skin staples were removed as well as the fascial suture.  Upon entering the abdomen there was a significant amount of old clotted blood and some dark fresh blood.  A Bookwalter device was implemented for visualization.  We spent a significant amount of time removing the clot to appropriately inspect for bleeding.  Upon initial inspection there was no obvious major vascular bleeding.  We examined all 4 quadrants as well as the raw surfaces along the colonic resection line.  There were several small areas of oozing areas along the resection line which were controlled with cautery and interrupted silk suture.  There was a very small area on the medial aspect of the spleen with a very slow and small amount of bleeding.  This was controlled with cautery and hemostatic gauze.  We did elect to take down the ileostomy in order to fully visualize the abdomen.  A VANDA stapler was used to divide it at the fascial opening internally.  The small bowel was then run from the ileostomy to the ligament of Treitz and there was no obvious issues.  There was no bleeding in the lesser sac along the greater curvature of the stomach.  We examined the pelvis and the residual distal sigmoid/rectum and there was no obvious bleeding.  We used hemostatic spray along the raw surfaces after irrigating out the abdomen multiple L of sterile saline.  An appropriate area along the right lateral abdominal wall a small disc of skin and subcutaneous tissues were removed and the fascia was divided in a cruciate fashion.  The ileostomy was brought up through this opening on the right side without difficulty.  The midline fascia was then closed using running looped PDS suture.  The subcutaneous tissues above the fascia were irrigated out with sterile saline and the skin was closed loosely with skin staples.  The  midline incision was covered with a sterile towel and the right lower quadrant end ileostomy was opened and brooked with interrupted 3-0 Vicryl.  The midline incision was then covered with an Aquacel dressing after the new ileostomy was isolated with sterile towels.  The end of the old ileostomy was  from the skin and sent for pathologic examination.  The old left-sided ileostomy site was irrigated out copiously with sterile saline and the fascial defect was closed using interrupted 1. PDS sutures.  The skin was closed loosely with 2-0 nylon sutures.  A sterile dressing was applied to the ileostomy takedown site and a appliance bag was placed around the new right-sided ileostomy.  At this point the patient was awoken from anesthesia having tolerated the procedure without difficulty and was returned to the intensive care unit in a guarded condition.  Patient's family was updated all questions were answered.  The ICU team was updated as well.      Manuel Javier M.D.,F.A.C.S.  [facility] and General Surgery  [facility]

## 2023-01-16 NOTE — PROGRESS NOTES
Progress Note  General Surgery    Admit Date: 1/13/2023  S/P: Procedure(s) (LRB):  LAPAROTOMY, EXPLORATORY (N/A)    Post-operative Day: 2 Days Post-Op    Hospital Day: 4    SUBJECTIVE:     H/H noted, Hgb 6.2 --> 7.6 after one unit, second ordered.  INR 1.6, recommended FFP, unclear if this has been given.  Pt with worsening mental status and back on pressors.  WBC remains elevated.  OBJECTIVE:     Vital Signs (Most Recent)  Temp: 97.5 °F (36.4 °C) (01/16/23 0845)  Pulse: 84 (01/16/23 0845)  Resp: (!) 32 (01/16/23 0845)  BP: (!) 80/43 (01/16/23 0845)  SpO2: 100 % (01/16/23 0845)    Vital Signs Range (Last 24H):  Temp:  [96.4 °F (35.8 °C)-99.2 °F (37.3 °C)]   Pulse:  [75-89]   Resp:  [15-43]   BP: ()/(43-78)   SpO2:  [99 %-100 %]   Arterial Line BP: ()/(37-60)     I & O (Last 24H):  Intake/Output Summary (Last 24 hours) at 1/16/2023 0855  Last data filed at 1/16/2023 0845  Gross per 24 hour   Intake 1270.17 ml   Output 55 ml   Net 1215.17 ml       Physical Exam:  Gen:ill appearing  HEENT: NCAT  Pulm: unlabored, symmetrical, hypercapnia  Abd: Soft, TTP, distended. No rebound, guarding. Ostomy appears viable although darker than yesterday morning    Wound/Incision:  clean, dry, intact    Laboratory:  Labs within the past 24 hours have been reviewed.    ASSESSMENT/PLAN:     Assessment/Plan:  F/U labs after second transfusion  KUB  Concerning change of events given nature of the surgery and low risk for postop bleeding.  May need to go back to OR, will follow closely    Manuel Javier M.D., F.A.C.S.  [facility] and General Surgery  [facility]

## 2023-01-16 NOTE — TRANSFER OF CARE
"Anesthesia Transfer of Care Note    Patient: Lily Green    Procedure(s) Performed: Procedure(s) (LRB):  LAPAROTOMY, EXPLORATORY (N/A)  CLOSURE, COLOSTOMY (Left)  CREATION, COLOSTOMY (Right)    Patient location: ICU    Anesthesia Type: general    Transport from OR: Transported from OR on 6-10 L/min O2 by face mask with adequate spontaneous ventilation. Continuous ECG monitoring in transport. Continuous SpO2 monitoring in transport. Continuos invasive BP monitoring in transport    Post pain: adequate analgesia    Post assessment: no apparent anesthetic complications    Post vital signs: stable    Level of consciousness: awake    Nausea/Vomiting: no nausea/vomiting    Complications: none    Transfer of care protocol was followed      Last vitals:   Visit Vitals  BP (!) 171/68   Pulse 96   Temp 36.4 °C (97.6 °F) (Axillary)   Resp (!) 25   Ht 5' 5" (1.651 m)   Wt 60 kg (132 lb 4.4 oz)   LMP  (LMP Unknown)   SpO2 99%   BMI 22.01 kg/m²     "

## 2023-01-16 NOTE — NURSING
Pt back from OR. Extubated, VSS.  Pt on 4L NC.  Surgical site CDI.  Pt showing signs of pain.  Unable to assess pain due to altered state.  0.2 IV Dilaudid given.  Dr. Javier at bedside and aware.  No new orders given at this time.  Will continue to monitor.

## 2023-01-16 NOTE — PROGRESS NOTES
Pt evaluated by Dr Alexis at bedside and felt to be too unstable for IR  Will proceed to emergent reopening lap when OR available, team currently in another case and no back personnel available  Family updated at bedside, consent obtained.    Manuel Javier M.D., F.A.C.S.  [facility]rjxqt-Vrrnhifie-Njrxvir and General Surgery  [facility]

## 2023-01-16 NOTE — ANESTHESIA POSTPROCEDURE EVALUATION
Anesthesia Post Evaluation    Patient: Lily Green    Procedure(s) Performed: Procedure(s) (LRB):  LAPAROTOMY, EXPLORATORY (N/A)  CLOSURE, COLOSTOMY (Left)  CREATION, COLOSTOMY (Right)    Final Anesthesia Type: general      Patient location during evaluation: ICU  Patient participation: Yes- Able to Participate  Level of consciousness: awake and alert, responds to stimulation and confused  Post-procedure vital signs: reviewed and stable  Pain management: adequate  Airway patency: patent    PONV status at discharge: No PONV  Anesthetic complications: no      Cardiovascular status: blood pressure returned to baseline and hemodynamically stable  Respiratory status: unassisted, room air and spontaneous ventilation  Hydration status: euvolemic  Follow-up not needed.          Vitals Value Taken Time   BP 98/54 01/16/23 1530   Temp 35.9 °C (96.6 °F) 01/16/23 1530   Pulse 103 01/16/23 1551   Resp 33 01/16/23 1551   SpO2 96 % 01/16/23 1551   Vitals shown include unvalidated device data.      No case tracking events are documented in the log.      Pain/Meredith Score: Pain Rating Prior to Med Admin: 8 (1/16/2023  3:44 PM)  Pain Rating Post Med Admin: 6 (1/16/2023 11:22 AM)

## 2023-01-16 NOTE — ANESTHESIA PROCEDURE NOTES
Intubation    Date/Time: 1/16/2023 1:11 PM  Performed by: Edie Swartz CRNA  Authorized by: Dheeraj Mcgarry MD     Intubation:     Induction:  Rapid sequence induction    Intubated:  Postinduction    Mask Ventilation:  N/a    Attempts:  1    Attempted By:  CRNA    Method of Intubation:  Video laryngoscopy    Blade:  [place] 3    Laryngeal View Grade: Grade I - full view of cords      Difficult Airway Encountered?: No      Complications:  None    Airway Device:  Oral endotracheal tube    Airway Device Size:  7.0    Style/Cuff Inflation:  Cuffed (inflated to minimal occlusive pressure)    Inflation Amount (mL):  6    Tube secured:  21    Secured at:  The lips    Placement Verified By:  Capnometry    Complicating Factors:  None    Findings Post-Intubation:  BS equal bilateral and atraumatic/condition of teeth unchanged

## 2023-01-16 NOTE — PLAN OF CARE
Interventional Radiology consulted for this patient who is s/p colectomy and end ileostomy creation with persistent worsening hemodynamic compromise. CTA reviewed with multiple foci of active extravasation noted alongside large volume hemoperitoneum.     Case discussed by IR Dr. Alexis with Dr. Javier. In light of patient's critical state, will plan for emergent angiogram/embolization versus return to OR, whichever will be most expeditious pending anesthesia availability.     Hal Perez MD  Fellow, Dept. Of Interventional Radiology  [facility]

## 2023-01-16 NOTE — PROGRESS NOTES
Progress Note  [facility]    Attending: Dr. Kan  Admit Date: 1/13/2023  Today's Date: 01/16/2023    SUBJECTIVE:     Patient seen and examined this morning. Pt received 2 units PRBC overnight due to drop in Hb from 10 to 6.2. Appears more confused today than yesterday. On Levophed for hypotension. S/p colectomy 1/14/23.    Review of Systems   Constitutional:  Negative for chills, fever and malaise/fatigue.   HENT:  Negative for congestion, sinus pain and sore throat.    Eyes:  Negative for blurred vision and discharge.   Respiratory:  Negative for cough and shortness of breath.    Cardiovascular:  Negative for chest pain and leg swelling.   Gastrointestinal:  Positive for abdominal pain. Negative for diarrhea, heartburn, nausea and vomiting.   Genitourinary:  Negative for dysuria and hematuria.   Musculoskeletal:  Negative for back pain.   Skin:  Negative for rash.   Neurological:  Negative for dizziness and headaches.   Psychiatric/Behavioral:  Positive for memory loss. The patient is nervous/anxious.      OBJECTIVE:     Vital Signs Trends/Hx Reviewed  Vitals:    01/16/23 0530 01/16/23 0600 01/16/23 0630 01/16/23 0638   BP: 90/64 94/63 (!) 100/55    Pulse: 80 82 82    Resp: (!) 30 (!) 28 (!) 43 (!) 26   Temp:       TempSrc:       SpO2: 100% 100% 100%    Weight:       Height:              Physical Exam  Constitutional:       General: She is not in acute distress.     Appearance: Normal appearance. She is normal weight.   HENT:      Head: Normocephalic and atraumatic.      Comments: Facial laceration on lip and ecchymosis of L eye     Mouth/Throat:      Mouth: Mucous membranes are moist.      Pharynx: Oropharynx is clear.   Eyes:      Extraocular Movements: Extraocular movements intact.      Conjunctiva/sclera: Conjunctivae normal.      Pupils: Pupils are equal, round, and reactive to light.   Cardiovascular:      Rate and Rhythm: Normal rate and regular rhythm.      Pulses: Normal  pulses.      Heart sounds: Normal heart sounds.   Pulmonary:      Effort: Pulmonary effort is normal.      Breath sounds: Normal breath sounds.   Abdominal:      General: Abdomen is flat. Bowel sounds are absent. There is no distension.      Palpations: Abdomen is soft.      Tenderness: There is abdominal tenderness in the left lower quadrant. There is no guarding.   Musculoskeletal:      Cervical back: Normal range of motion.      Right lower leg: No edema.      Left lower leg: No edema.   Skin:     General: Skin is warm.      Capillary Refill: Capillary refill takes 2 to 3 seconds.   Neurological:      General: No focal deficit present.      Mental Status: She is alert and oriented to person, place, and time.   Psychiatric:         Mood and Affect: Mood normal.         Behavior: Behavior normal.       Laboratory:  Recent Labs   Lab 01/16/23  0406   WBC 25.52*   RBC 1.95*   HGB 6.2*   HCT 16.9*      MCV 87   MCH 31.8*   MCHC 36.7*     Recent Labs   Lab 01/16/23  0328   *   K 5.6*   CL 95   CO2 17*   BUN 62*   CREATININE 4.1*   MG 2.9*     No results for input(s): PH, PCO2, PO2, HCO3, POCSATURATED, BE in the last 24 hours.      Chest Imaging:   No new chest imaging.     ASSESSMENT & RECOMMENDATIONS     Lily Green is a 72 y.o. female with Celiac's Disease, GERD, DDD, HTN, MARIXA/MDD who presented after a fall after 3-5 days of fatigue, weakness, and lethargy. Found to have dilated colon, taken for ex lap and eventually total colectomy on 1/14/23. Currently in ICU on Levophed with shock and ischemic bowel/acute abdomen.    Neuro/Psych  Acute Encephalopathy  Worsening mental status noted 1/16/23  Continue to monitor     Mild TBI  - Patient fell at home and hit her head, was intermittently confused at first, although may have been due to metabolic encephalopathy  Monitor  - CTH was unremarkable     Degenerative Disc Disease  - Hold Lyrica     CV  Shock   - suspected 2/2 septic shock from ischemic  bowel  - Was initially on 2 pressors, weaned off 115 am --> Levophed restarted as of   - Continue metronidazole and cefepime; stop oral vanc  - Formal TTE pending  - [place] II score 35%     Pulm  Acute Hypoxemic Respiratory Failure  - Resolved, SpO2 % on RA    FEN/GI  F: Levophed, vasopressin  E: Na 123 / K 6.6 / Cl 92 / bicarb 12  N: NPO     Acute Pancreatitis  Concerns for Acute Abdomen from Colonic Dilatation/Obstruction  Toxic megacolon  - S/P total colectomy with end ileostomy on 22  - Etiology unclear, c diff negative  - Elevated lactate at 1,278; concern for ischemic bowel  - General surgery consulted     Transaminitis  - suspected 2/2 shock + congestion + acute illness  - monitor, improving     RENAL  I/O last 3 completed shifts:  In: 1311.3 [I.V.:363.9; NG/GT:105; IV Piggyback:842.4]  Out: 245 [Urine:125; Stool:120]  I/O this shift:  In: 4122.6 [I.V.:28.5; Blood:3693.5; IV Piggyback:400.5]  Out: -       Intake/Output Summary (Last 24 hours) at 2023 1301  Last data filed at 2023 1242  Gross per 24 hour   Intake 4591.59 ml   Output 25 ml   Net 4566.59 ml     ATN/HINA  Lactic Acidosis  Metabolic Acidosis  - Cr baseline 0.4 - 0.6  s/p bradford placement, no evidence of obstruction  - Suspect ATN from shock  - Trialysis placed and s/p HD on 23  - Continue to monitor, nephrology following; plan for dialysis 23  - Avoid nephrotoxins, strict I/O  - BUN/Cr: 62/4.1 (39/2.4)     Heme  Anemia  - Hb 6.2 from 10 overnight  - Received 2 units PRBC  - CTA abdomen/pelvis ordered  - General surgery consulted  - Massive transfusion protcol initiated; transfuse 2 units PRBC today  - Plan for DVT US  - Follow up coags and H/H  - Continue to monitor    Endo  #T2DM  Last fasting B  - Glucose goal 140-180  - On 10 units detemir BID  - SSI + Accuchecks for now  - TSH wnl    ID  Septic shock  - Likely 2/2 toxic megacolon although etiology remains a little unclear (ischemic, IBD, colon CA,  "CMV)  - On cefepime/flagyl  - C diff negative  - Continue metronidazole and cefepime; stop oral vanc      DVT PPx: SCD, transition to Lovenox when appropriate  Diet: NPO  GI PPX: Famotidine  Deconditioning: PT/OT when stable  Code Status: Full    Dispo  Pt to remain in ICU for current management and monitoring. S/p ex lap and for dialysis today.    Pt seen and discussed with Pulm/Crit Care attending physician Dr. Kan.      Whitney Barksdale MD, MPH  [facility] PGY-1  [facility]   01/16/2023     Pt seen and examined with Pulmonary/Critical Care team. Critical Care time was spent validating the history and physical exam, reviewing the lab and imaging results, and discussing the care of the patient with the bedside nurse. The following additional comments are made:    Seen on rounds this AM.  Precipitous drop in hemoglobin.  New onset hypotension.  Large intraperitoneal fluid collection with "hematocrit sign." THis is hemorrhagic shock.  Pt sent emergently for CT angio where large intraperitoneal hematoma was found.  Resuscitated with MTP protocol, Ca++, TXA.  Taken to OR by Dr. Javier.  Now returns from OR extubated with normal BP.      Critical Care time 70 minutes    Heriberto Kan MD  Phone [phone]      "

## 2023-01-16 NOTE — PROGRESS NOTES
[facility]  History & Physical  Family Medicine       Admit Date: 2023   LOS: 3 days     Chief Complaint/Reason for Admission: Shock    HPI     Patient is a 71 y/o female with PMHx of diabetes type 2 (treated with metformin), HTN, and celiac disease (following a strict diet) who presented to OSH after falling and injuring her face while getting out of bed. The patient reported experiencing nausea, vomiting, diarrhea, abdominal pain, and weakness starting the day before. She has had a weight loss of 60 pounds over an extended period of time, which may have been partially caused by her diet. The patient's review of systems was otherwise normal. Of note patient is currently taking Lyrica, which has recently been increased. On evaluation at bedside, patient feels good just with mild abdominal pain. No other symptoms. Denies LOC with falls, does not remember if she hit her head.     ED Course: Presented hypotensive and tachycardic. Labs showed elevated WBC of 26.16, Glucose 269, , LA 7, and Beta-hydroxybutyrate 0.6. She was acidotic with ph of 7.282 and AG 21. Rest of the labs unremarkable. CT ab/pel revealed distention of the colon with a large amount of stool and fluid, and a segment of the colon that appeared narrowed with possible thickening of the wall, strongly suspected to be a colonic mass. S/p enema and disimpaction. The patient was given fluids (3 L) and antibiotics and transferred for further evaluation and possible surgery       Interval History: Patient Glucose difficult to control overnight started on D5w to help with hypoglycemia. Patient H/H 6.2/16.9 in AM. 2U Prbc given. Levophed restarted for hypotension.         PMHx  Past Medical History:   Diagnosis Date    Anxiety     Celiac disease 2018    Celiac disease     Depression     Diabetes mellitus     Family history of breast cancer in mother      at age 68    Hyperlipidemia     Hypertension     Meniere  disease     Menopause     Peptic ulcer     Reflux esophagitis     Urinary tract infection     Vaginal infection     Vaginal Pap smear 04/07/2014    Pap/Hpv Negative         PSHx  Past Surgical History:   Procedure Laterality Date    APPENDECTOMY      BREAST SURGERY      Tags    CARPAL TUNNEL RELEASE Bilateral 09/2017    ,     COLONOSCOPY  04/2018    Normal  (Cornell Velazco)     DILATION AND CURETTAGE OF UTERUS  1986    DUPUYTREN CONTRACTURE RELEASE Bilateral 09/2017    HYSTERECTOMY  1987    BEAU w/ appy, no BSO     INJECTION OF NEUROLYTIC AGENT AROUND LUMBAR SYMPATHETIC NERVE N/A 1/6/2022    Procedure: BLOCK, LUMBAR SYMPATHETIC;  Surgeon: Souleymane Rizo Jr., MD;  Location: [facility] PAIN [facility]GT;  Service: Pain Management;  Laterality: N/A;  no pacemaker   pt is diabetic     INJECTION OF NEUROLYTIC AGENT AROUND LUMBAR SYMPATHETIC NERVE N/A 8/25/2022    Procedure: BLOCK, LUMBAR SYMPATHETIC;  Surgeon: Souleymane Rizo Jr., MD;  Location: [facility] PAIN [facility]GT;  Service: Pain Management;  Laterality: N/A;  diabetic     SHOULDER SURGERY  2009 & 2010    right rotator cuff    TONSILLECTOMY      UPPER GASTROINTESTINAL ENDOSCOPY  04/2018       Family History  Family History   Problem Relation Age of Onset    Vision loss Father     Arthritis Father     Breast cancer Mother     Cancer Mother     Vision loss Mother     Hyperlipidemia Sister     Breast cancer Paternal Aunt     Cancer Paternal Aunt     Diabetes Paternal Grandfather     Vision loss Sister     Kidney disease Neg Hx        Social  Social History     Socioeconomic History    Marital status:    Tobacco Use    Smoking status: Never    Smokeless tobacco: Never   Substance and Sexual Activity    Alcohol use: No    Drug use: Never    Sexual activity: Not Currently     Partners: Male     Birth control/protection: See Surgical Hx     Comment: :      Social Determinants of Health     Financial Resource Strain: Low Risk     Difficulty  of Paying Living Expenses: Not hard at all   Food Insecurity: No Food Insecurity    Worried About Running Out of Food in the Last Year: Never true    Ran Out of Food in the Last Year: Never true   Transportation Needs: No Transportation Needs    Lack of Transportation (Medical): No    Lack of Transportation (Non-Medical): No   Physical Activity: Sufficiently Active    Days of Exercise per Week: 7 days    Minutes of Exercise per Session: 50 min   Stress: Stress Concern Present    Feeling of Stress : Very much   Social Connections: Unknown    Frequency of Communication with Friends and Family: More than three times a week    Frequency of Social Gatherings with Friends and Family: More than three times a week    Active Member of Clubs or Organizations: Yes    Attends Club or Organization Meetings: More than 4 times per year    Marital Status:    Housing Stability: Low Risk     Unable to Pay for Housing in the Last Year: No    Number of Places Lived in the Last Year: 1    Unstable Housing in the Last Year: No         Allergies  Review of patient's allergies indicates:   Allergen Reactions    Crestor [rosuvastatin] Swelling    Gluten protein        ROS- As per HPI, otherwise negative  Review of Systems   Constitutional:  Negative for chills, fever and malaise/fatigue.   HENT:  Negative for congestion, sinus pain and sore throat.    Eyes:  Negative for blurred vision and discharge.   Respiratory:  Negative for cough and shortness of breath.    Cardiovascular:  Negative for chest pain and leg swelling.   Gastrointestinal:  Positive for abdominal pain. Negative for diarrhea, heartburn, nausea and vomiting.   Genitourinary:  Negative for dysuria and hematuria.   Musculoskeletal:  Negative for back pain.   Skin:  Negative for rash.   Neurological:  Negative for dizziness and headaches.   Psychiatric/Behavioral:  Positive for memory loss. The patient is nervous/anxious.      Objective  Vitals(Most  Recent)      Temp: 96.4 °F (35.8 °C) (01/16/23 0527)  Pulse: 80 (01/16/23 0741)  Resp: (!) 27 (01/16/23 0741)  BP: 127/78 (01/16/23 0700)  SpO2: 100 % (01/16/23 0741)           Vitals Range  Temp:  [96.4 °F (35.8 °C)-99.2 °F (37.3 °C)]   Pulse:  [75-89]   Resp:  [15-43]   BP: ()/(53-78)   SpO2:  [99 %-100 %]   Arterial Line BP: ()/(37-60)     I/O  I/O last 3 completed shifts:  In: 1311.3 [I.V.:363.9; NG/GT:105; IV Piggyback:842.4]  Out: 245 [Urine:125; Stool:120]    Intake/Output Summary (Last 24 hours) at 1/16/2023 0745  Last data filed at 1/16/2023 0741  Gross per 24 hour   Intake 933.89 ml   Output 55 ml   Net 878.89 ml           Physical Exam  Physical Exam  Constitutional:       General: She is not in acute distress.     Appearance: Normal appearance. She is normal weight.   HENT:      Head: Normocephalic and atraumatic.      Comments: Facial laceration on lip and ecchymosis of L eye     Mouth/Throat:      Mouth: Mucous membranes are moist.      Pharynx: Oropharynx is clear.   Eyes:      Extraocular Movements: Extraocular movements intact.      Conjunctiva/sclera: Conjunctivae normal.      Pupils: Pupils are equal, round, and reactive to light.   Cardiovascular:      Rate and Rhythm: Normal rate and regular rhythm.      Pulses: Normal pulses.      Heart sounds: Normal heart sounds.   Pulmonary:      Effort: Pulmonary effort is normal.      Breath sounds: Normal breath sounds.   Abdominal:      General: Abdomen is flat. Bowel sounds are absent. There is no distension.      Palpations: Abdomen is soft.      Tenderness: There is abdominal tenderness in the left lower quadrant. There is no guarding.   Musculoskeletal:      Cervical back: Normal range of motion.      Right lower leg: No edema.      Left lower leg: No edema.   Skin:     General: Skin is warm.      Capillary Refill: Capillary refill takes 2 to 3 seconds.   Neurological:      General: No focal deficit present.      Mental Status: She is  alert and oriented to person, place, and time.   Psychiatric:         Mood and Affect: Mood normal.         Behavior: Behavior normal.       Labs  Recent Labs   Lab 01/16/23  0406   WBC 25.52*   RBC 1.95*   HGB 6.2*   HCT 16.9*      MCV 87   MCH 31.8*   MCHC 36.7*        Recent Labs   Lab 01/16/23  0328   CALCIUM 7.2*   PROT 4.3*   *   K 5.6*   CO2 17*   CL 95   BUN 62*   CREATININE 4.1*   ALKPHOS 101   ALT 97*   *   BILITOT 0.6          Lab Results   Component Value Date    INR 1.6 (H) 01/16/2023    INR 1.6 (H) 01/14/2023       Recent Labs     01/13/23  1418 01/14/23  0844   TROPONINI <0.012  --    CPK  --  111         A1c:   Lab Results   Component Value Date    HGBA1C 5.8 (H) 11/01/2022     , Last Gluc:   Recent Labs   Lab 01/15/23  1824 01/15/23  1904 01/15/23  2106 01/15/23  2345 01/16/23  0146 01/16/23  0554   POCTGLUCOSE 42* 173* 143* 210* 197* 213*         TSH:   Lab Results   Component Value Date    TSH 1.821 01/13/2023       UA  Urinalysis        Micro  Microbiology Results (last 7 days)       Procedure Component Value Units Date/Time    Clostridium difficile EIA [[id number]] Collected: 01/14/23 0241    Order Status: Sent Specimen: Stool Updated: 01/14/23 0336             ABG  Recent Labs   Lab 01/14/23  0243 01/14/23  0842 01/14/23  1207   PH 7.215* 7.142* 7.304*   PCO2 37.5 49.2* 26.1*   PO2 33* 160* 95   HCO3 15.2* 16.8* 12.9*   POCSATURATED 52* 99 97   BE -13 -12 -13         Imaging           Assessment/Plan:  Lily Green is a 72 y.o. female patient w/ PMHx of diabetes type 2 (treated with metformin), HTN, and celiac disease (following a strict diet)     Here with   1. Shock  2. ischemic bowel/ acute abdomen     Neuro/Psych  #Fall with head trauma- Concussion  Appears lethargic, slightly confused, but does answer questions appropriately  H/o of frequent falls  Home Lyrica recently increased.  CTH unremarkable  - Continue to monitor  - Memory, attention and word finding  difficulty noted  - Oriented to person place and font of knowledge     CV  #Shock  Poss septic, LA 7 > 6.8  Responsive to fluids  - Pressors restarted, Levophed @ 0.2    - Will continue abx  - IV pressor support prn   - F/u echo  - Trend lactate        # Anemia  H/H dropped from 10 to 6.2 overnight  Down from 17 on admit  Initial factors dilution, and blood loss from colectomy 1/4/23  New drop of unknown cause  No free fluid visualized on Bedside abd US  Plan    Repeat H/h after transfusion    CT vs CTA abdomen    Monitor signs of bleeding, bruising, bloody ostomy output    Follow up coagulopathy work up        Pulm  On RA no issues        FEN/GI  #Ischemic bowel  #Acute Abdomen  Although abdomen non concerning, imaging concerning  Also elevated LA concerning for ischemic bowel  Amylase greatly elevated pointing to ischemia  CT ab/pel revealing distention of the colon with a large amount of stool and fluid, and a segment of the colon that appeared narrowed with possible thickening of the wall, strongly suspected to be a colonic mass  Surgery s/p colon resection revealed no evidence of mass  No evidence of prior CV disease   Etiology unknown  Bladder pressure wnl    Gen surg consulted  - S/p bowel resection, tolerated well, will f/u with formal report  - Diet: Clear liquids, gluten free, low sugar, no artificial sweeteners   - Keep Mg 2, K 4  - Zofran prn for n/v   - F/u Gen surg recs     #Pancreatitis  Repeat Lipase   Will investigate if potential pancreatitis component    #hypocalcemia  Stable, monitor        RENAL  #HINA  Likely 2/2 to shock  BUN/Cr: 58/1.7 > 60/1.9  UOP: minimal despite aggressive IVF  - HD on 1/14/23  -  in 24 hr  -Worsened BUN 62/4.1  -Phos 8.5    Plan  Strict I&Os  Avoid renal toxic meds   Continue to monitor renal status and urine output   Repeat HD 1/16/23        Heme  Status post bowel resection  H/H down from 17.2/50.7 on admit  10/27.9  WBC increased 13 >18 monitor  PT/INR  16.4/1.6  DVT prophylaxis: lovenox 30mg      # Anemia  H/H dropped from 10 to 6.2 overnight  Down from 17 on admit  Initial factors dilution, and blood loss from colectomy 1/4/23  New drop of unknown cause  No free fluid visualized on Bedside abd US  Plan    Repeat H/h after transfusion   CTA abdomen    Monitor signs of bleeding, bruising, bloody ostomy output    Follow up coagulopathy work up  -U/S legs            Endo  #possible starvation ketoacidosis  #T2DM   Elevated Bgs, AG, and slight B-hydrox  - Supportive management  - Maintain glucose 140-180  - IVF prn  - SSI and long acting  - Holding AM levemir in setting of repeat low BG  - F/u BMP      ID  Diarrhea concerning for infectious etiology  - Stool smells like C-diff, will empirically treat  - Stool Samples ordered, f/u  - Stop oral vanc, will continue metro and Cef  - DC cef tomorrow  - Blood culture ordered           DVT PPx: SCD, transition to Lovenox when appropriate  Diet: NPO  GI PPX: None  Deconditioning: PT/OT  Code Status: Full        Dispo  - S/p bowel resection, Will f/u with surg recs. Continue current management for now      1/16/2023 Bal Burr M.D., -II  [facility]

## 2023-01-16 NOTE — PROGRESS NOTES
Notified Dr Miramontes pt having increased restlessness. Continuously stating she needs help, but not answering what she needs help with. Pt appears to be in pain. 1x Dilaudid .5mg ordered.

## 2023-01-16 NOTE — ANESTHESIA PREPROCEDURE EVALUATION
01/16/2023  Pre-operative evaluation for Procedure(s) (LRB):  LAPAROTOMY, EXPLORATORY (N/A)  CLOSURE, COLOSTOMY (Left)  CREATION, COLOSTOMY (Right)    Lily Green is a 72 y.o. female     Patient Active Problem List   Diagnosis    Type 2 diabetes mellitus with diabetic polyneuropathy, without long-term current use of insulin    Hyperlipidemia, mixed    GERD without esophagitis    Mild recurrent major depression    Primary insomnia    Chronic neck pain    DDD (degenerative disc disease), cervical    Screening mammogram, encounter for    Celiac disease    Hand arthritis    Benign essential tremor    Chronic pain of both knees    Irritable bowel syndrome    TAINA (stress urinary incontinence, female)    Meniere's disease of both ears    Ankle weakness    Psoriasis    Idiopathic neuropathy    Bilateral foot pain    Generalized anxiety disorder    Cognitive complaints    Peritonitis    Shock    Large bowel ischemia    ATN (acute tubular necrosis)    HINA (acute kidney injury)    Acute hypoxemic respiratory failure       Review of patient's allergies indicates:   Allergen Reactions    Crestor [rosuvastatin] Swelling    Gluten protein        No current facility-administered medications on file prior to encounter.     Current Outpatient Medications on File Prior to Encounter   Medication Sig Dispense Refill    alpha lipoic acid 600 mg Cap       atorvastatin (LIPITOR) 40 MG tablet TAKE 1 TABLET BY MOUTH ONCE DAILY 90 tablet 3    betahistine HCl (BETAHISTINE, BULK, MISC)       blood-glucose meter kit Use to test twice a day 1 each 0    calcium carbonate/vitamin D3 (CALTRATE WITH VITAMIN D3 ORAL) Take by mouth.      cetirizine HCl (ZYRTEC ORAL)       cinnamon bark (CINNAMON ORAL) Take by mouth.      coenzyme Q10 100 mg capsule       cyanocobalamin 1,000 mcg/mL injection  Inject 1 mL (1,000 mcg total) into the muscle every 30 days. 10 mL 3    dicyclomine (BENTYL) 10 MG capsule       DULoxetine (CYMBALTA) 30 MG capsule Take 1 capsule (30 mg total) by mouth 2 (two) times daily. 60 capsule 11    econazole nitrate 1 % cream Apply topically 2 (two) times daily. 30 g 2    finasteride (PROSCAR) 5 mg tablet       flaxseed oil 1,000 mg Cap once a day      gluc-condr-om3-dha-epa-fish-st [phone] mg Cap once a day      glucosamine HCl 1,500 mg Tab Take 1,500 mg by mouth.      KRILL OIL ORAL Take by mouth.      lancets Misc 1 lancet by Misc.(Non-Drug; Combo Route) route once daily. 200 each 3    meclizine (ANTIVERT) 25 mg tablet Take 1 tablet (25 mg total) by mouth 3 (three) times daily as needed for Dizziness. 20 tablet 0    metFORMIN (GLUCOPHAGE) 500 MG tablet Take 1 tablet (500 mg total) by mouth 3 (three) times daily. 2 po qam, 1 po qhs. (Patient taking differently: Take 500 mg by mouth 2 (two) times daily. 1 po qam, 1 po qhs.) 270 tablet 3    MILK THISTLE ORAL Take by mouth.      pantoprazole (PROTONIX) 40 MG tablet TAKE 1 TABLET BY MOUTH ONCE DAILY  90 tablet 3    pregabalin (LYRICA) 150 MG capsule Take 1 capsule (150 mg total) by mouth 2 (two) times daily. 60 capsule 0    temazepam (RESTORIL) 30 mg capsule TAKE ONE CAPSULE BY MOUTH NIGHTLY AS NEEDED FOR INSOMNIA 30 capsule 2    triamcinolone acetonide 0.1% (KENALOG) 0.1 % ointment Apply topically 2 (two) times daily as needed (rash). 80 g 11    triamterene-hydrochlorothiazide 37.5-25 mg (MAXZIDE-25) 37.5-25 mg per tablet       vit C,Z-Ey-gsckk-lutein-zeaxan (PRESERVISION AREDS 2) [id number] mg-unit-mg-mg Cap       vitamins  A,C,E-zinc-copper [phone] unit-mg-unit Cap Take 1 tablet by mouth once daily.         Past Surgical History:   Procedure Laterality Date    APPENDECTOMY      BREAST SURGERY      Tags    CARPAL TUNNEL RELEASE Bilateral 09/2017    ,     COLONOSCOPY  04/2018    Normal  ([facility]  [facility] A)     DILATION AND CURETTAGE OF UTERUS  1986    DUPUYTREN CONTRACTURE RELEASE Bilateral 09/2017    HYSTERECTOMY  1987    BEAU w/ appy, no BSO     INJECTION OF NEUROLYTIC AGENT AROUND LUMBAR SYMPATHETIC NERVE N/A 1/6/2022    Procedure: BLOCK, LUMBAR SYMPATHETIC;  Surgeon: Souleymane Rizo Jr., MD;  Location: [facility] PAIN [facility]GT;  Service: Pain Management;  Laterality: N/A;  no pacemaker   pt is diabetic     INJECTION OF NEUROLYTIC AGENT AROUND LUMBAR SYMPATHETIC NERVE N/A 8/25/2022    Procedure: BLOCK, LUMBAR SYMPATHETIC;  Surgeon: Souleymane Rizo Jr., MD;  Location: [facility] PAIN MGT;  Service: Pain Management;  Laterality: N/A;  diabetic     SHOULDER SURGERY  2009 & 2010    right rotator cuff    TONSILLECTOMY      UPPER GASTROINTESTINAL ENDOSCOPY  04/2018       Social History     Socioeconomic History    Marital status:    Tobacco Use    Smoking status: Never    Smokeless tobacco: Never   Substance and Sexual Activity    Alcohol use: No    Drug use: Never    Sexual activity: Not Currently     Partners: Male     Birth control/protection: See Surgical Hx     Comment: :      Social Determinants of Health     Financial Resource Strain: Low Risk     Difficulty of Paying Living Expenses: Not hard at all   Food Insecurity: No Food Insecurity    Worried About Running Out of Food in the Last Year: Never true    Ran Out of Food in the Last Year: Never true   Transportation Needs: No Transportation Needs    Lack of Transportation (Medical): No    Lack of Transportation (Non-Medical): No   Physical Activity: Sufficiently Active    Days of Exercise per Week: 7 days    Minutes of Exercise per Session: 50 min   Stress: Stress Concern Present    Feeling of Stress : Very much   Social Connections: Unknown    Frequency of Communication with Friends and Family: More than three times a week    Frequency of Social Gatherings with Friends and Family: More than three times a week    Active Member of  Clubs or Organizations: Yes    Attends Club or Organization Meetings: More than 4 times per year    Marital Status:    Housing Stability: Low Risk     Unable to Pay for Housing in the Last Year: No    Number of Places Lived in the Last Year: 1    Unstable Housing in the Last Year: No         CBC:   Recent Labs     23  1009 23  1251 23  1534   WBC 29.67*  --  17.13*   RBC 3.04*  --  3.76*   HGB 9.3*  9.3* 11.2*  11.2* 10.3*   HCT 26.8*  --  29.5*   *  --  154   MCV 88  --  79*   MCH 30.6  --  27.4   MCHC 34.7  --  34.9       CMP:   Recent Labs     01/15/23  0331 23  0328 23  1251   * 124* 123*   K 4.7 5.6* 6.6*    95 92*   CO2 23 17* 12*   BUN 39* 62* 67*   CREATININE 2.4* 4.1* 4.0*   GLU 47* 185* 195*   MG 2.7* 2.9* 2.7*   PHOS 5.3* 8.5* 11.7*   CALCIUM 7.6* 7.2* 7.3*   ALBUMIN 2.2* 1.7* 2.2*   PROT 5.2* 4.3*  --    ALKPHOS 137* 101  --    * 97*  --    * 145*  --    BILITOT 0.6 0.6  --        INR  Recent Labs     23  0206 23  0328   INR 1.6* 1.6*           Diagnostic Studies:      EKD Echo:  No results found for this or any previous visit.        Pre-op Assessment    I have reviewed the Patient Summary Reports.     I have reviewed the Nursing Notes. I have reviewed the NPO Status.   I have reviewed the Medications.     Review of Systems  Anesthesia Hx:  Denies Family Hx of Anesthesia complications.   Denies Personal Hx of Anesthesia complications.   Hematology/Oncology:         -- Anemia: Hematology Comments: S.p transfusion of 6u pRBCs, 1u FFP, 1u platelets   Cardiovascular:   Hypertension Denies Dysrhythmias.   Denies Angina.  Denies EDEN.    Pulmonary:   Denies COPD.  Denies Asthma.  Denies Recent URI.    Renal/:   Chronic Renal Disease, Dialysis, CKD    Hepatic/GI:   PUD, GERD S/p ex lap on  for GIB, now with recurrent bleed   Musculoskeletal:   Arthritis   Spine Disorders: Disc disease and Degenerative  disease    Neurological:   Denies CVA. Denies Seizures.   Peripheral Neuropathy    Endocrine:   Diabetes, type 2    Psych:   anxiety depression          Physical Exam  General: Lethargic  Acutely ill   Airway:  Mallampati: III / II/ III  Mouth Opening: Small, but > 3cm  TM Distance: Normal  Tongue: Normal  Neck ROM: Normal ROM  NGT in place to suction       Anesthesia Plan  Type of Anesthesia, risks & benefits discussed:    Anesthesia Type: Gen ETT  Intra-op Monitoring Plan: Standard ASA Monitors, Art Line and Central Line  Post Op Pain Control Plan: multimodal analgesia and IV/PO Opioids PRN  Induction:  IV  Airway Plan: Video, Post-Induction  Informed Consent: Informed consent signed with the Patient representative and all parties understand the risks and agree with anesthesia plan.  All questions answered.   ASA Score: 3 Emergent  Day of Surgery Review of History & Physical: H&P Update referred to the surgeon/provider.    Ready For Surgery From Anesthesia Perspective.     .

## 2023-01-16 NOTE — PROGRESS NOTES
CTA reviewed , + active bleeding  Discussed with on call radiologist as well as Dr Alexis and Loreta  Plan for IR embolization, if not successful will need to return to OR  Pt's  updated    Manuel Javier M.D., F.A.C.S.  [facility] General Surgery  [facility]     Chief Complaint   Patient presents with     Urgent Care     Otitis Media     Rt ear infection x3 days. Pt has congestion as well.     SUBJECTIVE:  Rach Peterson is a 30 year old female who presents with right earache, congestion for 3 days. She has been swimming. Declines fever, drainage.    Past Medical History:   Diagnosis Date     Anxiety     as adult, therapy, no meds or hosp     Carrier of group B Streptococcus 06/1/2020     Genital herpes 12/1/2011     Herpes simplex without mention of complication 12/1/2011     Hypertension 07/11/2020    Tied to pre-eclampsia, seems to have returned to normal     Knee pain, right      Syncope and collapse 2006    Cardiology workup with Dr Brady, wandering atrial pacer, no activity restritction and no need for abx prophylaxis (mild pulm flow murmur)     valACYclovir (VALTREX) 500 MG tablet, Take 1 tablet (500 mg) by mouth daily  acetaminophen (TYLENOL) 325 MG tablet, Take 2 tablets (650 mg) by mouth every 6 hours as needed for mild pain Start after Delivery.  etonogestrel (NEXPLANON) 68 MG IMPL, 1 each (68 mg) by Subdermal route once for 1 dose  hydrOXYzine (ATARAX) 25 MG tablet, Take 2 tablets (50 mg) by mouth every 6 hours as needed for anxiety  ibuprofen (ADVIL/MOTRIN) 600 MG tablet, Take 1 tablet (600 mg) by mouth every 6 hours as needed for moderate pain Start after delivery  Prenatal Vit-Fe Fumarate-FA (PRENATAL MULTIVITAMIN W/IRON) 27-0.8 MG tablet, Take 1 tablet by mouth daily    No current facility-administered medications on file prior to visit.    Social History     Tobacco Use     Smoking status: Never Smoker     Smokeless tobacco: Never Used     Tobacco comment: Non smoking home   Substance Use Topics     Alcohol use: Not Currently     Comment: four drinks once every two weeks.     Allergies   Allergen Reactions     No Known Drug Allergies      Review of Systems   All systems negative except for those listed above in HPI.    OBJECTIVE:  /80   Pulse 95  "  Temp 97.5  F (36.4  C) (Temporal)   Resp 18   Ht 1.778 m (5' 10\")   Wt 68 kg (150 lb)   SpO2 97%   BMI 21.52 kg/m       Physical Exam  Vitals reviewed.   Constitutional:       General: She is not in acute distress.     Appearance: Normal appearance. She is not ill-appearing, toxic-appearing or diaphoretic.   HENT:      Head: Normocephalic and atraumatic.      Ears:      Comments: Right tympanic membrane is bulging and erythematous.     Nose: Congestion and rhinorrhea present.   Cardiovascular:      Rate and Rhythm: Normal rate.      Pulses: Normal pulses.   Pulmonary:      Effort: Pulmonary effort is normal. No respiratory distress.      Breath sounds: Normal breath sounds. No stridor. No wheezing, rhonchi or rales.   Chest:      Chest wall: No tenderness.   Musculoskeletal:         General: Normal range of motion.   Skin:     General: Skin is warm and dry.      Findings: No rash.   Neurological:      General: No focal deficit present.      Mental Status: She is alert and oriented to person, place, and time.   Psychiatric:         Mood and Affect: Mood normal.         Behavior: Behavior normal.       ASSESSMENT:    ICD-10-CM    1. OME (otitis media with effusion), right  H65.91 amoxicillin (AMOXIL) 875 MG tablet     PLAN:     Amox for right ear infection  Mucinex, zyrtec, flonase for runny stuffy nose  Rotate tylenol and ibuprofen    Follow up with primary care provider with any problems, questions or concerns or if symptoms worsen or fail to improve. Patient agreed to plan and verbalized understanding.    Anuja Robertson, [facility] [place]  "

## 2023-01-16 NOTE — PT/OT/SLP PROGRESS
Physical Therapy  Missed Visit      Patient Name:  Lily Green   MRN:  [id number]    Patient not seen today secondary to low H&H and currently receiving blood. Will follow-up 1/17/2023.

## 2023-01-16 NOTE — PROGRESS NOTES
[facility] Progress Note    Subjective:      Ms. Green is not doing well today. Hemoglobin dropped to 6 this morning and received blood. Mentation impaired this morning and only mumbling to me that she feels cold but not answering questions. Now blood pressure dropping and back on pressors and lactate rising again. Was planning on HD but with new instability changing to CRRT. Plan discussed with patient and .      Objective:   Last 24 Hour Vital Signs:  BP  Min: 80/43  Max: 156/59  Temp  Av.9 °F (36.6 °C)  Min: 96.4 °F (35.8 °C)  Max: 99.2 °F (37.3 °C)  Pulse  Av.1  Min: 75  Max: 100  Resp  Av.7  Min: 16  Max: 43  SpO2  Av.7 %  Min: 98 %  Max: 100 %  I/O last 3 completed shifts:  In: 1311.3 [I.V.:363.9; NG/GT:105; IV Piggyback:842.4]  Out: 245 [Urine:125; Stool:120]    Physical Examination:  Gen: awake, mumbles to speak and not answering questions   HEENT: normocephalic, EOMI, MMM  CV: RRR, no murmurs   Lungs: CTAB, symmetric chest rise  GI: Soft, tender to palpation, new colostomy present  Extrem: no edema, clubbing, or cyanosis  Skin: dry, warm, intact.         Laboratory:  Recent Labs   Lab 23  1247 01/14/23  2000 01/15/23  0331 23  0328 23  0406 23  0744 23  1009   WBC  --   --  18.41* 26.10* 25.52* 27.90* 29.67*   HGB  --   --  10.0* 6.3* 6.2* 7.6* 9.3*  9.3*   HCT  --   --  27.9* 17.3* 16.9* 21.4@* 26.8*   PLT  --   --  174 163 162 154 137*   * 131* 132* 124*  --   --   --    K 4.8 4.2 4.7 5.6*  --   --   --     99 100 95  --   --   --    CREATININE 2.4* 1.6* 2.4* 4.1*  --   --   --    BUN 62* 31* 39* 62*  --   --   --    CO2 13* 21* 23 17*  --   --   --    ALT 94*  --  117* 97*  --   --   --    *  --  239* 145*  --   --   --        Radiology:  Imaging has been reviewed personally.       Assessment and Plan:     Lily Green is a 72 y.o. woman with a history of celiac disease, DM2 well controlled, HTN, anxiety  presented to [facility] after having a fall. Noted to have severe lactic acidosis with imaging concerning for ischemic colitis. Became hypotensive and started on levophed and vasopressin for shock. On arrival to [place] taken to OR for colectomy. UOP severely diminished with only about 100ml total in first 24 hours of hospitalization. [facility] consulted for severe metabolic acidosis with pH 7.14 and HINA in setting of septic shock and ischemic colitis. HD initiated 1/14 and had one session with improvement in acidosis. Now having decreasing Hb and hemodynamically unstable. Starting CRRT today.      Stage III Oliguric HINA  - baseline creatinine 0.8  - presenting in shock on pressors with ischemic colitis  - s/p colectomy 1/14   - remained oliguric with refractory acidosis after surgery  - HD initiated 1/14 x one session and then acidosis resolved  - renal function not recovering yet and still minimal UOP; today more acidotic and lactate rising >12. Planning to go back to IR versus surgery today.  - start CRRT when back from procedure  - renally dose all medications      Anion Gap Metabolic Acidosis  - initial bicarb 12; lactate 7; pH 7.14; gap 21  - s/p colectomy but acidosis persistent  - HD 1/14 with bicarb   - now today blood counts dropping and lactate >12; will need additional HD today but hemodynamically unstable. Will start CRRT      Hyponatremia  - not making much urine, only 55ml in last 24 hours. Na down to 126 prior to HD on 1/14 and resolved  - now back down to 124  - CRRT today with volume removal as tolerated for hypotension      Hyperphosphatemia  - phos 8.5 today. Not eating much and now NPO. Will decrease with CRRT. No need for binder      Hypocalcemia  - Ca 7.2 but corrects for low albumin       Thank you for allowing us to participate in the care of this patient. Please call with any questions.       Elizabeth Mobley, DO  [facility]

## 2023-01-16 NOTE — PT/OT/SLP PROGRESS
Occupational Therapy  Missed Eval    Patient Name:  Lily Green   MRN:  [id number]    Patient not seen today secondary to Nurse/ ALYSSIA hold 2/2 pt w/ critical H&H & receiving blood at this time. Will follow-up .    1/16/2023

## 2023-01-16 NOTE — CONSULTS
Consult received 2/2 pt with Celiac disease and needs assistance with hospital meals. Pt currently on gluten free diet. RD working remotely for weekend coverage. Will have dietary review over meal choices before each meal. RD to follow up with pt.

## 2023-01-16 NOTE — NURSING
Dr. Torres, Dr. Whittington, Dr. Alexis ( IR), and Dr. Javier ( surgery ), at bedside during massive transfusion initiated and titration or pressors. Consents have been side, spouse and family at the beside, awaiting transfer to OR.

## 2023-01-17 LAB
ALBUMIN SERPL BCP-MCNC: 2.7 G/DL (ref 3.5–5.2)
ALBUMIN SERPL BCP-MCNC: 2.7 G/DL (ref 3.5–5.2)
ALP SERPL-CCNC: 95 U/L (ref 55–135)
ALT SERPL W/O P-5'-P-CCNC: 1434 U/L (ref 10–44)
ANION GAP SERPL CALC-SCNC: 11 MMOL/L (ref 8–16)
ANION GAP SERPL CALC-SCNC: 11 MMOL/L (ref 8–16)
AORTIC ROOT ANNULUS: 3.23 CM
APTT BLDCRRT: 33.2 SEC (ref 21–32)
AST SERPL-CCNC: 2049 U/L (ref 10–40)
AV INDEX (PROSTH): 0.71
AV MEAN GRADIENT: 4 MMHG
AV PEAK GRADIENT: 7 MMHG
AV REGURGITATION PRESSURE HALF TIME: 535.17 MS
AV VALVE AREA: 2.78 CM2
AV VELOCITY RATIO: 0.48
BASOPHILS # BLD AUTO: 0.05 K/UL (ref 0–0.2)
BASOPHILS NFR BLD: 0.3 % (ref 0–1.9)
BILIRUB SERPL-MCNC: 1.9 MG/DL (ref 0.1–1)
BSA FOR ECHO PROCEDURE: 1.67 M2
BUN SERPL-MCNC: 50 MG/DL (ref 8–23)
BUN SERPL-MCNC: 50 MG/DL (ref 8–23)
CALCIUM SERPL-MCNC: 7.9 MG/DL (ref 8.7–10.5)
CALCIUM SERPL-MCNC: 7.9 MG/DL (ref 8.7–10.5)
CHLORIDE SERPL-SCNC: 101 MMOL/L (ref 95–110)
CHLORIDE SERPL-SCNC: 101 MMOL/L (ref 95–110)
CO2 SERPL-SCNC: 20 MMOL/L (ref 23–29)
CO2 SERPL-SCNC: 20 MMOL/L (ref 23–29)
CREAT SERPL-MCNC: 2.9 MG/DL (ref 0.5–1.4)
CREAT SERPL-MCNC: 2.9 MG/DL (ref 0.5–1.4)
CV ECHO LV RWT: 0.73 CM
DIFFERENTIAL METHOD: ABNORMAL
DOP CALC AO PEAK VEL: 1.34 M/S
DOP CALC AO VTI: 17 CM
DOP CALC LVOT AREA: 3.9 CM2
DOP CALC LVOT DIAMETER: 2.23 CM
DOP CALC LVOT PEAK VEL: 0.64 M/S
DOP CALC LVOT STROKE VOLUME: 47.24 CM3
DOP CALC MV VTI: 11.3 CM
DOP CALCLVOT PEAK VEL VTI: 12.1 CM
E WAVE DECELERATION TIME: 317.25 MSEC
E/A RATIO: 0.9
E/E' RATIO: 7.07 M/S
ECHO LV POSTERIOR WALL: 1.32 CM (ref 0.6–1.1)
EJECTION FRACTION: 35 %
EOSINOPHIL # BLD AUTO: 0 K/UL (ref 0–0.5)
EOSINOPHIL NFR BLD: 0.2 % (ref 0–8)
ERYTHROCYTE [DISTWIDTH] IN BLOOD BY AUTOMATED COUNT: 19.2 % (ref 11.5–14.5)
EST. GFR  (NO RACE VARIABLE): 17 ML/MIN/1.73 M^2
EST. GFR  (NO RACE VARIABLE): 17 ML/MIN/1.73 M^2
FIBRINOGEN PPP-MCNC: 396 MG/DL (ref 182–400)
FRACTIONAL SHORTENING: 11 % (ref 28–44)
GLUCOSE SERPL-MCNC: 95 MG/DL (ref 70–110)
GLUCOSE SERPL-MCNC: 95 MG/DL (ref 70–110)
HCT VFR BLD AUTO: 26.6 % (ref 37–48.5)
HGB BLD-MCNC: 10.1 G/DL (ref 12–16)
HGB BLD-MCNC: 9.8 G/DL (ref 12–16)
HGB BLD-MCNC: 9.8 G/DL (ref 12–16)
HGB BLD-MCNC: 9.9 G/DL (ref 12–16)
IMM GRANULOCYTES # BLD AUTO: 0.12 K/UL (ref 0–0.04)
IMM GRANULOCYTES NFR BLD AUTO: 0.7 % (ref 0–0.5)
INR PPP: 1.3 (ref 0.8–1.2)
INTERVENTRICULAR SEPTUM: 1.38 CM (ref 0.6–1.1)
IVC DIAMETER: 1.12 CM
IVRT: 97.05 MSEC
LA MAJOR: 3.36 CM
LA MINOR: 3.22 CM
LACTATE SERPL-SCNC: 0.8 MMOL/L (ref 0.5–2.2)
LACTATE SERPL-SCNC: 0.8 MMOL/L (ref 0.5–2.2)
LACTATE SERPL-SCNC: 1 MMOL/L (ref 0.5–2.2)
LACTATE SERPL-SCNC: 1.1 MMOL/L (ref 0.5–2.2)
LEFT ATRIUM SIZE: 3.09 CM
LEFT ATRIUM VOLUME INDEX MOD: 23.1 ML/M2
LEFT ATRIUM VOLUME MOD: 38.55 CM3
LEFT INTERNAL DIMENSION IN SYSTOLE: 3.25 CM (ref 2.1–4)
LEFT VENTRICLE DIASTOLIC VOLUME INDEX: 33.38 ML/M2
LEFT VENTRICLE DIASTOLIC VOLUME: 55.74 ML
LEFT VENTRICLE MASS INDEX: 103 G/M2
LEFT VENTRICLE SYSTOLIC VOLUME INDEX: 25.4 ML/M2
LEFT VENTRICLE SYSTOLIC VOLUME: 42.48 ML
LEFT VENTRICULAR INTERNAL DIMENSION IN DIASTOLE: 3.64 CM (ref 3.5–6)
LEFT VENTRICULAR MASS: 172.5 G
LV LATERAL E/E' RATIO: 8.83 M/S
LV SEPTAL E/E' RATIO: 5.89 M/S
LVOT MG: 0.83 MMHG
LVOT MV: 0.42 CM/S
LYMPHOCYTES # BLD AUTO: 1.5 K/UL (ref 1–4.8)
LYMPHOCYTES NFR BLD: 8.4 % (ref 18–48)
MAGNESIUM SERPL-MCNC: 2.3 MG/DL (ref 1.6–2.6)
MAGNESIUM SERPL-MCNC: 2.4 MG/DL (ref 1.6–2.6)
MAGNESIUM SERPL-MCNC: 2.4 MG/DL (ref 1.6–2.6)
MCH RBC QN AUTO: 28 PG (ref 27–31)
MCHC RBC AUTO-ENTMCNC: 36.8 G/DL (ref 32–36)
MCV RBC AUTO: 76 FL (ref 82–98)
MONOCYTES # BLD AUTO: 1.7 K/UL (ref 0.3–1)
MONOCYTES NFR BLD: 9.1 % (ref 4–15)
MV A" WAVE DURATION": 151.28 MSEC
MV MEAN GRADIENT: 1 MMHG
MV PEAK A VEL: 0.59 M/S
MV PEAK E VEL: 0.53 M/S
MV PEAK GRADIENT: 2 MMHG
MV STENOSIS PRESSURE HALF TIME: 45.13 MS
MV VALVE AREA BY CONTINUITY EQUATION: 4.18 CM2
MV VALVE AREA P 1/2 METHOD: 4.87 CM2
NEUTROPHILS # BLD AUTO: 14.7 K/UL (ref 1.8–7.7)
NEUTROPHILS NFR BLD: 81.3 % (ref 38–73)
NRBC BLD-RTO: 0 /100 WBC
PHOSPHATE SERPL-MCNC: 5.3 MG/DL (ref 2.7–4.5)
PHOSPHATE SERPL-MCNC: 5.3 MG/DL (ref 2.7–4.5)
PISA AR MAX VEL: 2.38 M/S
PISA MRMAX VEL: 4.04 M/S
PISA TR MAX VEL: 2.35 M/S
PLATELET # BLD AUTO: 116 K/UL (ref 150–450)
PMV BLD AUTO: 9.7 FL (ref 9.2–12.9)
POCT GLUCOSE: 126 MG/DL (ref 70–110)
POCT GLUCOSE: 66 MG/DL (ref 70–110)
POCT GLUCOSE: 66 MG/DL (ref 70–110)
POCT GLUCOSE: 74 MG/DL (ref 70–110)
POCT GLUCOSE: 75 MG/DL (ref 70–110)
POTASSIUM SERPL-SCNC: 4.8 MMOL/L (ref 3.5–5.1)
POTASSIUM SERPL-SCNC: 4.8 MMOL/L (ref 3.5–5.1)
PROT SERPL-MCNC: 4.8 G/DL (ref 6–8.4)
PROTHROMBIN TIME: 13.6 SEC (ref 9–12.5)
PULM VEIN S/D RATIO: 1.82
PV PEAK D VEL: 0.22 M/S
PV PEAK S VEL: 0.4 M/S
RA MAJOR: 3.97 CM
RBC # BLD AUTO: 3.5 M/UL (ref 4–5.4)
RIGHT VENTRICULAR END-DIASTOLIC DIMENSION: 1.94 CM
RV TISSUE DOPPLER FREE WALL SYSTOLIC VELOCITY 1 (APICAL 4 CHAMBER VIEW): 0.02 CM/S
SODIUM SERPL-SCNC: 132 MMOL/L (ref 136–145)
SODIUM SERPL-SCNC: 132 MMOL/L (ref 136–145)
TDI LATERAL: 0.06 M/S
TDI SEPTAL: 0.09 M/S
TDI: 0.08 M/S
TR MAX PG: 22 MMHG
WBC # BLD AUTO: 18.05 K/UL (ref 3.9–12.7)

## 2023-01-17 PROCEDURE — 80100014 HC HEMODIALYSIS 1:1

## 2023-01-17 PROCEDURE — 85018 HEMOGLOBIN: CPT | Mod: 91 | Performed by: INTERNAL MEDICINE

## 2023-01-17 PROCEDURE — 83605 ASSAY OF LACTIC ACID: CPT | Mod: 91 | Performed by: INTERNAL MEDICINE

## 2023-01-17 PROCEDURE — S0030 INJECTION, METRONIDAZOLE: HCPCS | Performed by: SURGERY

## 2023-01-17 PROCEDURE — 99900035 HC TECH TIME PER 15 MIN (STAT)

## 2023-01-17 PROCEDURE — 87040 BLOOD CULTURE FOR BACTERIA: CPT | Mod: 59 | Performed by: FAMILY MEDICINE

## 2023-01-17 PROCEDURE — 83735 ASSAY OF MAGNESIUM: CPT | Mod: 91 | Performed by: STUDENT IN AN ORGANIZED HEALTH CARE EDUCATION/TRAINING PROGRAM

## 2023-01-17 PROCEDURE — 25000003 PHARM REV CODE 250: Performed by: FAMILY MEDICINE

## 2023-01-17 PROCEDURE — 51798 US URINE CAPACITY MEASURE: CPT

## 2023-01-17 PROCEDURE — 85025 COMPLETE CBC W/AUTO DIFF WBC: CPT | Performed by: STUDENT IN AN ORGANIZED HEALTH CARE EDUCATION/TRAINING PROGRAM

## 2023-01-17 PROCEDURE — 85610 PROTHROMBIN TIME: CPT | Performed by: FAMILY MEDICINE

## 2023-01-17 PROCEDURE — 25000003 PHARM REV CODE 250: Performed by: STUDENT IN AN ORGANIZED HEALTH CARE EDUCATION/TRAINING PROGRAM

## 2023-01-17 PROCEDURE — 63600175 PHARM REV CODE 636 W HCPCS: Performed by: STUDENT IN AN ORGANIZED HEALTH CARE EDUCATION/TRAINING PROGRAM

## 2023-01-17 PROCEDURE — 85384 FIBRINOGEN ACTIVITY: CPT | Performed by: STUDENT IN AN ORGANIZED HEALTH CARE EDUCATION/TRAINING PROGRAM

## 2023-01-17 PROCEDURE — 25000003 PHARM REV CODE 250: Performed by: SURGERY

## 2023-01-17 PROCEDURE — 25000003 PHARM REV CODE 250: Performed by: INTERNAL MEDICINE

## 2023-01-17 PROCEDURE — 80053 COMPREHEN METABOLIC PANEL: CPT | Performed by: STUDENT IN AN ORGANIZED HEALTH CARE EDUCATION/TRAINING PROGRAM

## 2023-01-17 PROCEDURE — 20000000 HC ICU ROOM

## 2023-01-17 PROCEDURE — 27000221 HC OXYGEN, UP TO 24 HOURS

## 2023-01-17 PROCEDURE — 63600175 PHARM REV CODE 636 W HCPCS: Performed by: FAMILY MEDICINE

## 2023-01-17 PROCEDURE — 83605 ASSAY OF LACTIC ACID: CPT | Performed by: INTERNAL MEDICINE

## 2023-01-17 PROCEDURE — 94761 N-INVAS EAR/PLS OXIMETRY MLT: CPT

## 2023-01-17 PROCEDURE — 85018 HEMOGLOBIN: CPT | Performed by: INTERNAL MEDICINE

## 2023-01-17 PROCEDURE — 85730 THROMBOPLASTIN TIME PARTIAL: CPT | Performed by: STUDENT IN AN ORGANIZED HEALTH CARE EDUCATION/TRAINING PROGRAM

## 2023-01-17 PROCEDURE — 90945 DIALYSIS ONE EVALUATION: CPT

## 2023-01-17 PROCEDURE — 84100 ASSAY OF PHOSPHORUS: CPT | Performed by: STUDENT IN AN ORGANIZED HEALTH CARE EDUCATION/TRAINING PROGRAM

## 2023-01-17 PROCEDURE — 83735 ASSAY OF MAGNESIUM: CPT | Performed by: STUDENT IN AN ORGANIZED HEALTH CARE EDUCATION/TRAINING PROGRAM

## 2023-01-17 PROCEDURE — 87040 BLOOD CULTURE FOR BACTERIA: CPT | Performed by: STUDENT IN AN ORGANIZED HEALTH CARE EDUCATION/TRAINING PROGRAM

## 2023-01-17 RX ORDER — HEPARIN 100 UNIT/ML
2400 SYRINGE INTRAVENOUS
Status: DISCONTINUED | OUTPATIENT
Start: 2023-01-17 | End: 2023-01-17

## 2023-01-17 RX ORDER — SODIUM CHLORIDE 9 MG/ML
INJECTION, SOLUTION INTRAVENOUS ONCE
Status: DISCONTINUED | OUTPATIENT
Start: 2023-01-17 | End: 2023-01-26 | Stop reason: HOSPADM

## 2023-01-17 RX ORDER — HEPARIN SODIUM 1000 [USP'U]/ML
2400 INJECTION, SOLUTION INTRAVENOUS; SUBCUTANEOUS
Status: DISCONTINUED | OUTPATIENT
Start: 2023-01-17 | End: 2023-01-26 | Stop reason: HOSPADM

## 2023-01-17 RX ADMIN — METRONIDAZOLE 500 MG: 5 INJECTION, SOLUTION INTRAVENOUS at 06:01

## 2023-01-17 RX ADMIN — CEFEPIME 2 G: 2 INJECTION, POWDER, FOR SOLUTION INTRAVENOUS at 10:01

## 2023-01-17 RX ADMIN — METRONIDAZOLE 500 MG: 5 INJECTION, SOLUTION INTRAVENOUS at 12:01

## 2023-01-17 RX ADMIN — DEXTROSE 125 ML: 10 SOLUTION INTRAVENOUS at 03:01

## 2023-01-17 RX ADMIN — HYDROMORPHONE HYDROCHLORIDE 0.5 MG: 1 INJECTION, SOLUTION INTRAMUSCULAR; INTRAVENOUS; SUBCUTANEOUS at 06:01

## 2023-01-17 RX ADMIN — HYDROMORPHONE HYDROCHLORIDE 0.5 MG: 1 INJECTION, SOLUTION INTRAMUSCULAR; INTRAVENOUS; SUBCUTANEOUS at 01:01

## 2023-01-17 RX ADMIN — HEPARIN SODIUM 2400 UNITS: 1000 INJECTION, SOLUTION INTRAVENOUS; SUBCUTANEOUS at 12:01

## 2023-01-17 RX ADMIN — FAMOTIDINE 20 MG: 10 INJECTION, SOLUTION INTRAVENOUS at 09:01

## 2023-01-17 RX ADMIN — MUPIROCIN: 20 OINTMENT TOPICAL at 09:01

## 2023-01-17 RX ADMIN — INSULIN DETEMIR 8 UNITS: 100 INJECTION, SOLUTION SUBCUTANEOUS at 08:01

## 2023-01-17 RX ADMIN — HYDROMORPHONE HYDROCHLORIDE 0.5 MG: 1 INJECTION, SOLUTION INTRAMUSCULAR; INTRAVENOUS; SUBCUTANEOUS at 07:01

## 2023-01-17 RX ADMIN — METRONIDAZOLE 500 MG: 5 INJECTION, SOLUTION INTRAVENOUS at 09:01

## 2023-01-17 RX ADMIN — DEXTROSE 125 ML: 10 SOLUTION INTRAVENOUS at 07:01

## 2023-01-17 RX ADMIN — MUPIROCIN: 20 OINTMENT TOPICAL at 08:01

## 2023-01-17 RX ADMIN — CHLORHEXIDINE GLUCONATE 0.12% ORAL RINSE 15 ML: 1.2 LIQUID ORAL at 09:01

## 2023-01-17 RX ADMIN — INSULIN DETEMIR 10 UNITS: 100 INJECTION, SOLUTION SUBCUTANEOUS at 09:01

## 2023-01-17 NOTE — CONSULTS
[facility] - Int[facility] Care  Wound Care    Patient Name:  Lily Green   MRN:  [id number]  Date: 2023  Diagnosis: Peritonitis    History:     Past Medical History:   Diagnosis Date    Anxiety     Celiac disease 2018    Celiac disease     Depression     Diabetes mellitus     Family history of breast cancer in mother      at age 68    Hyperlipidemia     Hypertension     Meniere disease     Menopause     Peptic ulcer     Reflux esophagitis     Urinary tract infection     Vaginal infection     Vaginal Pap smear 2014    Pap/Hpv Negative        Social History     Socioeconomic History    Marital status:    Tobacco Use    Smoking status: Never    Smokeless tobacco: Never   Substance and Sexual Activity    Alcohol use: No    Drug use: Never    Sexual activity: Not Currently     Partners: Male     Birth control/protection: See Surgical Hx     Comment: :      Social Determinants of Health     Financial Resource Strain: Low Risk     Difficulty of Paying Living Expenses: Not hard at all   Food Insecurity: No Food Insecurity    Worried About Running Out of Food in the Last Year: Never true    Ran Out of Food in the Last Year: Never true   Transportation Needs: No Transportation Needs    Lack of Transportation (Medical): No    Lack of Transportation (Non-Medical): No   Physical Activity: Sufficiently Active    Days of Exercise per Week: 7 days    Minutes of Exercise per Session: 50 min   Stress: Stress Concern Present    Feeling of Stress : Very much   Social Connections: Unknown    Frequency of Communication with Friends and Family: More than three times a week    Frequency of Social Gatherings with Friends and Family: More than three times a week    Active Member of Clubs or Organizations: Yes    Attends Club or Organization Meetings: More than 4 times per year    Marital Status:    Housing Stability: Low Risk     Unable to Pay for Housing in the Last Year: No    Number of  Places Lived in the Last Year: 1    Unstable Housing in the Last Year: No       Precautions:     Allergies as of 01/13/2023 - Reviewed 01/13/2023   Allergen Reaction Noted    Crestor [rosuvastatin] Swelling 11/01/2017    Gluten protein  11/27/2020       WOC Assessment Details/Treatment     Wound care consult received for ostomy care.  Spoke with nurse who reports no concerns with ostomy care at this time. Will follow pt prn.    01/17/2023

## 2023-01-17 NOTE — PROGRESS NOTES
01/17/23 1717   Vital Signs   Temp 97.8 °F (36.6 °C)   Temp src Axillary   Pulse 80   Resp (!) 80   SpO2 100 %   Art Line   Arterial Line /55   During Hemodialysis Assessment   Blood Flow Rate (mL/min) 300 mL/min   Dialysate Flow Rate (mL/min) 600 ml/min   Ultrafiltration Rate (mL/Hr) 750 mL/Hr   Arteriovenous Lines Secure Yes   Arterial Pressure (mmHg) -76 mmHg   Venous Pressure (mmHg) 64   Blood Volume Processed (Liters) 49 L   UF Removed (mL) 750 mL   TMP 22   Venous Line in Air Detector Yes   Transducer Dry Yes   Access Visible Yes   Intra-Hemodialysis Comments Hd completed   Post-Hemodialysis Assessment   Rinseback Volume (mL) 250 mL   Blood Volume Processed (Liters) 49 L   Dialyzer Clearance Clear   Duration of Treatment 120 minutes   Additional Fluid Intake (mL) 250 mL   Total UF (mL) 1500 mL   Net Fluid Removal 1000   Patient Response to Treatment tolerated   Post-Hemodialysis Comments post HD report given to primary nurse

## 2023-01-17 NOTE — PROGRESS NOTES
[facility] Progress Note    Subjective:      Ms. Green is still not mentating clearly. She had a hard day yesterday and ended up going back to OR for ex lap and new ileostomy creation yesterday. Hemorrhagic shock has now resolved and blood counts are staying stable and pressors discontinued.     Started on CRRT yesterday but has clot on venous end that cant be aspirated. So that we do not lose all the blood in the circuit I have advised to rinse back and hold CRRT today. We will start HD since BP is stable and off pressors.      Objective:   Last 24 Hour Vital Signs:  BP  Min: 98/54  Max: 185/67  Temp  Av.9 °F (36.6 °C)  Min: 96.6 °F (35.9 °C)  Max: 98.6 °F (37 °C)  Pulse  Av.1  Min: 64  Max: 115  Resp  Av.9  Min: 10  Max: 52  SpO2  Av.9 %  Min: 93 %  Max: 100 %  I/O last 3 completed shifts:  In: 5614.7 [I.V.:1066; Blood:3693.5; IV Piggyback:855.3]  Out: 1509 [Urine:25; Other:1484]    5.2L input yesterday, 1.5L out    Physical Examination:  Gen: awake, staring. Says her name but unable to answer any other questions. Moans in pain  HEENT: normocephalic, EOMI, MMM  CV: RRR, no murmurs   Lungs: CTAB, symmetric chest rise  GI: Soft, moans in pain when abdomen touched   Extrem: no edema, clubbing, or cyanosis  Skin: dry, warm, intact.       Laboratory:  Recent Labs   Lab 01/15/23  0331 23  0328 23  0406 23  1009 23  1251 23  1534 23  2018 23  0006 23  0323 23  0816   WBC 18.41* 26.10*   < > 29.67*  --  17.13*  --   --  18.05*  --    HGB 10.0* 6.3*   < > 9.3*  9.3*   < > 10.3* 10.0* 9.9* 9.8*  9.8* 10.1*   HCT 27.9* 17.3*   < > 26.8*  --  29.5*  --   --  26.6*  --     163   < > 137*  --  154  --   --  116*  --    * 124*  --   --    < > 124* 128*  --  132*  132*  --    K 4.7 5.6*  --   --    < > 6.1* 5.1  --  4.8  4.8  --     95  --   --    < > 94* 98  --  101  101  --    CREATININE 2.4* 4.1*  --   --    < > 4.1* 3.5*  --   2.9*  2.9*  --    BUN 39* 62*  --   --    < > 68* 59*  --  50*  50*  --    CO2 23 17*  --   --    < > 16* 18*  --  20*  20*  --    * 97*  --   --   --   --   --   --  1,434*  --    * 145*  --   --   --   --   --   --  2,049*  --     < > = values in this interval not displayed.       Radiology:  Imaging has been reviewed personally.       Assessment and Plan:     Lily Green is a 72 y.o. woman with a history of celiac disease, DM2 well controlled, HTN, anxiety presented to [facility] after having a fall. Noted to have severe lactic acidosis with imaging concerning for ischemic colitis. Became hypotensive and started on levophed and vasopressin for shock. On arrival to [place] taken to OR for colectomy. UOP severely diminished and essentially became anuric with only 50ml UOP. [facility] consulted for severe metabolic acidosis with pH 7.14 and HINA in setting of septic shock and ischemic colitis. HD initiated 1/14 and had one session with improvement in acidosis. Developed hemorrhagic shock with bleeding into the abdomen 1/16 and taken back to OR for exlap and ileostomy creation. CRRT initiated 1/16, now off pressors and will change back to iHD.      Stage III Oliguric HINA  - baseline creatinine 0.8  - presenting in shock on pressors with ischemic colitis.  s/p colectomy 1/14 but still with refractory acidosis and minimal UOP after.  - HD initiated 1/14 x one session and then acidosis resolved  - on 1/16 developed hemorragic shock, taken back to OR. Was on 2 pressors. CRRT initiated  - today off pressors and still somewhat acidotic. 4L positive from yesterday. Will give 2 hours HD today to catch up with acidosis that had not resolved with CRRT  - renally dose all medications      Anion Gap Metabolic Acidosis  - initial bicarb 12; lactate 7; pH 7.14; gap 21  - s/p colectomy but acidosis persistent. HD initiated 1/14  - lactate > 12 again on 1/16 and taken back to OR. Improving after  CRRT.   - bicarb today 20, will get 2 hrs HD today     Hyponatremia  - Na 124 and not making much urine. After CRRT improved to 132.   - HD today      Hyperphosphatemia  - phos 5.3 today, will have HD again today. No need for binder yet as she is not taking in anything PO     Hypocalcemia  - Ca 7.9 but corrects for low albumin       Thank you for allowing us to participate in the care of this patient. Please call with any questions.       Elizabeth Mobley, DO  [facility]

## 2023-01-17 NOTE — ASSESSMENT & PLAN NOTE
71 yo female now s/p total abdominal colectomy with end ileostomy, takeback for bleeding on 1/16, now stable and off pressors    NPO  H/H is stable, continue to trend  Will watch for ostomy output  Rest of care per primary team

## 2023-01-17 NOTE — PROGRESS NOTES
Pharmacist Renal Dose Adjustment Note    Lily Green is a 72 y.o. female being treated with the medication cefepime    Patient Data:    Vital Signs (Most Recent):  Temp: 97.8 °F (36.6 °C) (01/17/23 0726)  Pulse: 72 (01/17/23 0630)  Resp: 15 (01/17/23 0743)  BP: 126/68 (01/17/23 0600)  SpO2: 97 % (01/17/23 0630) Vital Signs (72h Range):  Temp:  [96.4 °F (35.8 °C)-100.3 °F (37.9 °C)]   Pulse:  []   Resp:  [10-52]   BP: ()/(43-81)   SpO2:  [93 %-100 %]   Arterial Line BP: ()/(37-68)      Recent Labs   Lab 01/16/23  1534 01/16/23 2018 01/17/23 0323   CREATININE 4.1* 3.5* 2.9*  2.9*     Serum creatinine: 2.9 mg/dL (H) 01/17/23 0323  Estimated creatinine clearance: 15.8 mL/min (A)    Medication:cefepime dose: 2 g frequency q 24 h will be changed to medication:cefepime dose:2 g frequency:q 12 h    Pharmacist's Name: Chi Lundberg  Pharmacist's Extension: [phone]

## 2023-01-17 NOTE — ASSESSMENT & PLAN NOTE
Contributing Nutrition Diagnosis  Inadequate energy intake    Related to (etiology):   Dx, s/p surgery    Signs and Symptoms (as evidenced by):   Unable to consume po diet, NG tube placed     Interventions:  Collaboration with other providers    Nutrition Diagnosis Status:   Improving (now on [facility] Soft diet)

## 2023-01-17 NOTE — PT/OT/SLP PROGRESS
Physical Therapy      Patient Name:  Lily Green   MRN:  [id number]    Patient not seen today secondary to Nurse/ ALYSSIA hold. Will follow-up as appropriate.  1/17/2023

## 2023-01-17 NOTE — PROGRESS NOTES
01/16/23 1545   Treatment   Treatment Type CVVH   Treatment Status New start   Dialysis Machine Number [id number]   Dialyzer Time (hours) 43   Solutions Labeled and Current  Yes   Access Temporary Cath;Right;IJ   Catheter Dressing Intact  Yes   Alarms Engaged Yes   $ CRRT Charges   $ CRRT Charges Initial Setup;CRRT Cartridge   Prescription   Time (Hours) Continuous   Dialysate K + (mEq/L) 2   Dialysate CA + (mEq/L) 3   Dialysate HCO3 - (Bicarb) (mEq/L) 35   Dialysate Na + (mEq/L) 140   Cartridge Type Other  ([id number])   Dialysate Flow Rate (mL/min) 300   UF Goal Rate 100 mL/hr   CRRT Hourly Documentation   Blood Flow (mL/min) 300   UF Rate 50 cc/hr   Arterial Pressure (mmHg) -86 mmHg   Venous Pressure (mmHg) 111 mmHg   Effluent Pressure (EP) (mmHg) 136 mmHg   Balance Chamber  174     Fluids and cartridges stocked on unit.

## 2023-01-17 NOTE — PROGRESS NOTES
[facility] - Int[facility] Care  General Surgery  Progress Note    Subjective:     History of Present Illness:  No notes on file    Post-Op Info:  Procedure(s) (LRB):  LAPAROTOMY, EXPLORATORY (N/A)  CLOSURE, COLOSTOMY (Left)  CREATION, COLOSTOMY (Right)   1 Day Post-Op     Interval History:   No acute issues overnight. Afebrile. VSS since OR takeback, off pressors since return from OR  Pain controlled   no nausea / vomiting  Ostomy with bowel sweat  Tolerating dialysis      Medications:  Continuous Infusions:   sodium chloride 0.9% Stopped (01/16/23 1015)    NORepinephrine bitartrate-D5W Stopped (01/16/23 1241)    vasopressin 0.04 Units/min (01/16/23 1002)     Scheduled Meds:   ceFEPime (MAXIPIME) IVPB  2 g Intravenous Q24H    chlorhexidine  15 mL Mouth/Throat BID    famotidine (PF)  20 mg Intravenous Daily    insulin detemir U-100  10 Units Subcutaneous BID    metronidazole  500 mg Intravenous Q8H    mupirocin   Nasal BID     PRN Meds:sodium chloride, sodium chloride, sodium chloride, sodium chloride, sodium chloride, sodium chloride, sodium chloride, sodium chloride, sodium chloride, sodium chloride, sodium chloride, acetaminophen, dextrose 10%, dicyclomine, glucagon (human recombinant), hydrALAZINE, HYDROcodone-acetaminophen, HYDROmorphone, insulin aspart U-100, LIDOcaine, magnesium sulfate IVPB, melatonin, ondansetron, promethazine, sodium chloride 0.9%, sodium chloride 0.9%, sodium phosphate IVPB, sodium phosphate IVPB, sodium phosphate IVPB     Review of patient's allergies indicates:   Allergen Reactions    Crestor [rosuvastatin] Swelling    Gluten protein      Objective:     Vital Signs (Most Recent):  Temp: 98.4 °F (36.9 °C) (01/17/23 0330)  Pulse: 72 (01/17/23 0630)  Resp: 12 (01/17/23 0630)  BP: 126/68 (01/17/23 0600)  SpO2: 97 % (01/17/23 0630) Vital Signs (24h Range):  Temp:  [96.6 °F (35.9 °C)-98.6 °F (37 °C)] 98.4 °F (36.9 °C)  Pulse:  [] 72  Resp:  [10-52] 12  SpO2:  [93 %-100 %] 97 %  BP:  ()/(43-81) 126/68  Arterial Line BP: ()/(43-68) 156/56     Weight: 60 kg (132 lb 4.4 oz)  Body mass index is 22.01 kg/m².    Intake/Output - Last 3 Shifts         01/15 0700  01/16 0659 01/16 0700  01/17 0659    I.V. (mL/kg) 282.3 (4.7) 790.6 (13.2)    Blood  3693.5    IV Piggyback 584.7 661.6    Total Intake(mL/kg) 867 (14.5) 5145.7 (85.8)    Urine (mL/kg/hr) 55 (0) 0 (0)    Other  1484    Stool 0     Total Output 55 1484    Net +812 +3661.7          Stool Occurrence 0 x             Physical Exam  Vitals and nursing note reviewed.   Constitutional:       General: She is not in acute distress.  Cardiovascular:      Rate and Rhythm: Normal rate and regular rhythm.   Pulmonary:      Effort: Pulmonary effort is normal. No respiratory distress.   Abdominal:      General: Abdomen is flat.      Palpations: Abdomen is soft.      Tenderness: There is abdominal tenderness (appropriate post op tenderness).      Comments: Midline incision cdi with dressing in place    Ostomy patent with small areas of discoloration with bowel sweat   Skin:     General: Skin is warm and dry.   Neurological:      Mental Status: She is alert.      Comments: No response to questions       Significant Labs:  I have reviewed all pertinent lab results within the past 24 hours.  CBC:   Recent Labs   Lab 01/17/23  0323   WBC 18.05*   RBC 3.50*   HGB 9.8*  9.8*   HCT 26.6*   *   MCV 76*   MCH 28.0   MCHC 36.8*     CMP:   Recent Labs   Lab 01/17/23  0323   GLU 95  95   CALCIUM 7.9*  7.9*   ALBUMIN 2.7*  2.7*   PROT 4.8*   *  132*   K 4.8  4.8   CO2 20*  20*     101   BUN 50*  50*   CREATININE 2.9*  2.9*   ALKPHOS 95   ALT 1,434*   AST 2,049*   BILITOT 1.9*       Significant Diagnostics:  I have reviewed all pertinent imaging results/findings within the past 24 hours.    Assessment/Plan:     Large bowel ischemia  73 yo female now s/p total abdominal colectomy with end ileostomy, takeback for bleeding on 1/16, now  stable and off pressors    NPO  H/H is stable, continue to trend  Will watch for ostomy output  Rest of care per primary team          Sekou Villatoro MD  General Surgery  [place] - Intensive Care      Pt seen and examined.  Agree with note and plan.  Am INR pending, no signs of recurrent bleeding at present, will monitor closely    Manuel Javier M.D., F.A.C.S.  [facility]sbvhs-Gjcoxgmia-Xchjy[facility] and General Surgery  [facility]

## 2023-01-17 NOTE — PROGRESS NOTES
"Progress Note  [facility]    Attending: Dr. Kan  Admit Date: 1/13/2023  Today's Date: 01/17/2023    SUBJECTIVE:     Patient seen and examined this morning. Pt more alert today than yesterday. AAOx1 to self (able to state name). When asked where she is, pt responds with her last name "Norma". Responds to pain. Not able to follow commands. CRRT running although clot noted, will transition to HD.    Review of Systems   Constitutional:  Negative for chills, fever and malaise/fatigue.   HENT:  Negative for congestion, sinus pain and sore throat.    Eyes:  Negative for blurred vision and discharge.   Respiratory:  Negative for cough and shortness of breath.    Cardiovascular:  Positive for chest pain. Negative for leg swelling.   Gastrointestinal:  Positive for abdominal pain. Negative for diarrhea, heartburn, nausea and vomiting.   Genitourinary:  Negative for dysuria and hematuria.   Musculoskeletal:  Negative for back pain.   Skin:  Negative for rash.   Neurological:  Positive for weakness. Negative for dizziness and headaches.   Psychiatric/Behavioral:  Positive for memory loss.      OBJECTIVE:     Vital Signs Trends/Hx Reviewed  Vitals:    01/17/23 0600 01/17/23 0615 01/17/23 0630 01/17/23 0743   BP: 126/68      Pulse: 68 72 72    Resp: 11 14 12 15   Temp:       TempSrc:       SpO2: 99% 98% 97%    Weight:       Height:              Physical Exam  Constitutional:       General: She is not in acute distress.     Appearance: Normal appearance. She is normal weight. She is ill-appearing.   HENT:      Head: Normocephalic and atraumatic.      Comments: Facial laceration on lip and ecchymosis of L eye     Mouth/Throat:      Mouth: Mucous membranes are moist.      Pharynx: Oropharynx is clear.   Eyes:      Extraocular Movements: Extraocular movements intact.      Conjunctiva/sclera: Conjunctivae normal.      Pupils: Pupils are equal, round, and reactive to light.   Cardiovascular:      " Rate and Rhythm: Normal rate and regular rhythm.      Pulses: Normal pulses.      Heart sounds: Normal heart sounds.   Pulmonary:      Effort: Pulmonary effort is normal.      Breath sounds: Normal breath sounds.   Abdominal:      General: Abdomen is flat. There is no distension.      Palpations: Abdomen is soft.      Tenderness: There is generalized abdominal tenderness. There is no guarding.   Musculoskeletal:      Cervical back: Normal range of motion.      Right lower leg: No edema.      Left lower leg: No edema.   Skin:     General: Skin is warm.      Capillary Refill: Capillary refill takes 2 to 3 seconds.   Neurological:      General: No focal deficit present.      Mental Status: She is alert. She is disoriented.       Laboratory:  Recent Labs   Lab 01/17/23  0323   WBC 18.05*   RBC 3.50*   HGB 9.8*  9.8*   HCT 26.6*   *   MCV 76*   MCH 28.0   MCHC 36.8*     Recent Labs   Lab 01/17/23  0323   *  132*   K 4.8  4.8     101   CO2 20*  20*   BUN 50*  50*   CREATININE 2.9*  2.9*   MG 2.4     Recent Labs   Lab 01/16/23  1615   PH 7.319*   PCO2 30.7*   PO2 74*   HCO3 15.8*   POCSATURATED 94*   BE -10     Chest Imaging:   No new chest imaging.       ASSESSMENT & RECOMMENDATIONS     Lily Green is a 72 y.o. female with Celiac's Disease, GERD, DDD, HTN, MARIXA/MDD who presented after a fall after 3-5 days of fatigue, weakness, and lethargy. Found to have dilated colon, taken for ex lap and eventually total colectomy on 1/14/23. Currently in ICU for shock and ischemic bowel/acute abdomen, s/p repeat ex lap on 1/16/23.    Neuro/Psych  Acute Encephalopathy  Worsening mental status noted 1/16/23; some improvement today as pt more alert and responsive  Continue to monitor     Mild TBI  - Patient fell at home and hit her head, was intermittently confused at first, although may have been due to metabolic encephalopathy  - Monitor  - CTH was unremarkable     Degenerative Disc Disease  - Hold  Lyrica     CV  Shock   - suspected 2/2 septic shock from ischemic bowel  - [place] II score 35% on 1/16/23  - Was initially on 2 pressors, weaned off 1/15 am --> Levophed restarted on 1/16 --> now not requiring vasopressors  - Continue metronidazole and cefepime  - Echo results - EF 35%, Grade I LV diastolic dysfunction, mild AR, mild MR, mild TR, no pulm htn.  - CTAP: Active extravasation of arterial contrast in LUQ in keeping with rapid active hemorrhage. Large volume hemoperitoneum throughout abdomen and pelvis.  - Hemorrhagic shock now resolved     Pulm  Acute Hypoxemic Respiratory Failure  - Resolved, SpO2 % on RA    Recent Labs     01/16/23  1615   PH 7.319*   PCO2 30.7*   PO2 74*   HCO3 15.8*   POCSATURATED 94*   BE -10      FEN/GI  F:  E: Na 132 / K 4.8 / Cl 101 / bicarb 20  N: clear liquids, gluten free     Acute Pancreatitis  Concerns for Acute Abdomen from Colonic Dilatation/Obstruction  Toxic megacolon  - S/P total colectomy with end ileostomy on 1/14/22  - C diff negative  - Elevated lactate at 1,278; concern for ischemic bowel  - General surgery consulted; repeat ex lap done on 1/16 for large bowel ischemia    Transaminitis  - suspected 2/2 shock + congestion + acute illness  - monitor     RENAL  I/O last 3 completed shifts:  In: 5614.7 [I.V.:1066; Blood:3693.5; IV Piggyback:855.3]  Out: 1509 [Urine:25; Other:1484]  No intake/output data recorded.     Intake/Output Summary (Last 24 hours) at 1/17/2023 0905  Last data filed at 1/17/2023 0600  Gross per 24 hour   Intake 4620.2 ml   Output 1484 ml   Net 3136.2 ml       ATN/HINA  Lactic Acidosis  Metabolic Acidosis  - Cr baseline 0.4 - 0.6  s/p bradford placement, no evidence of obstruction  - Suspect ATN from shock  - Trialysis placed and s/p HD on 1/14/23  - CRRT running 1/17 AM  - Continue to monitor, nephrology following; plan to switch to HD  - Avoid nephrotoxins, strict I/O  - BUN/Cr: 50/2.9 (59/3.5) (39/2.4)     Heme  Anemia  - H/H 10/26  -  Previously Hb 6.2 from 10 overnight on   - Received 2 units PRBC  - CTA abdomen/pelvis ordered  - General surgery consulted  - Massive transfusion protcol initiated; transfused another 2 units PRBC   - 2 units FFP transfused  - Plan for DVT US  - Follow up coags and H/H  - Fibrinogen ordered  - Continue to monitor     Endo  #T2DM  Last fasting B  - Glucose goal 140-180  - SSI + Accuchecks for now  - TSH wnl     ID  Septic shock  Gram negative bacteremia  - Possibly 2/2 toxic megacolon although etiology remains a little unclear (ischemic, IBD, colon CA, CMV) --> large bowel ischemia  - C diff negative  - Oral vanc d/c   - Continue metronidazole and cefepime  - Follow up BCx        DVT PPx: SCD, transition to Lovenox when appropriate  Diet: Clear liquids, gluten free   GI PPX: N/A  Deconditioning: PT/OT when stable  Code Status: Full       Dispo  Pt to remain in ICU for current management.    Thank you for allowing us to participate in the care of this patient. We will continue to follow. Please call with questions.     Pt seen and discussed with Pulm/Crit Care attending physician Dr. Kan.       Whitney Barksdale MD, MPH  [facility] PGY-1  [facility]   2023     Pt seen and examined with Pulmonary/Critical Care team. Critical Care time was spent validating the history and physical exam, reviewing the lab and imaging results, and discussing the care of the patient with the bedside nurse. The following additional comments are made:    More animated today.  BP better.  H/H stable. Remains oliguric. Abx appropriate.  Begin trickle feedings. Hold one more day on VTE prophylaxis. Discussed with Dr. Javier and sister.    Critical Care time 35 minutes    Heriberto Kan MD  Phone [phone]

## 2023-01-17 NOTE — NURSING
HD orders canceled via telephone orders per Dr. Mboley with [facility] nephrology. CRRT ordered instead.

## 2023-01-17 NOTE — PROGRESS NOTES
Pharmacist Renal Dose Adjustment Note    Lily Green is a 72 y.o. female being treated with the medication cefepime    Patient Data:    Vital Signs (Most Recent):  Temp: 97.7 °F (36.5 °C) (01/17/23 1121)  Pulse: 79 (01/17/23 0930)  Resp: (!) 23 (01/17/23 0930)  BP: 138/68 (01/17/23 0930)  SpO2: 100 % (01/17/23 0930)   Vital Signs (72h Range):  Temp:  [96.4 °F (35.8 °C)-100.3 °F (37.9 °C)]   Pulse:  []   Resp:  [10-52]   BP: ()/(43-81)   SpO2:  [93 %-100 %]   Arterial Line BP: ()/(37-68)      Recent Labs   Lab 01/16/23  1534 01/16/23 2018 01/17/23 0323   CREATININE 4.1* 3.5* 2.9*  2.9*     Serum creatinine: 2.9 mg/dL (H) 01/17/23 0323  Estimated creatinine clearance: 15.8 mL/min (A)    Medication:cefepime dose: 2 g frequency q 12 h will be changed to medication: cefepime dose:2 g frequency:q 24 h    Pharmacist's Name: Chi Lundberg  Pharmacist's Extension: [phone]

## 2023-01-17 NOTE — PROGRESS NOTES
[facility]  History & Physical  Family Medicine       Admit Date: 2023   LOS: 4 days     Chief Complaint/Reason for Admission: Shock    HPI     Patient is a 73 y/o female with PMHx of diabetes type 2 (treated with metformin), HTN, and celiac disease (following a strict diet) who presented to OSH after falling and injuring her face while getting out of bed. The patient reported experiencing nausea, vomiting, diarrhea, abdominal pain, and weakness starting the day before. She has had a weight loss of 60 pounds over an extended period of time, which may have been partially caused by her diet. The patient's review of systems was otherwise normal. Of note patient is currently taking Lyrica, which has recently been increased. On evaluation at bedside, patient feels good just with mild abdominal pain. No other symptoms. Denies LOC with falls, does not remember if she hit her head.     ED Course: Presented hypotensive and tachycardic. Labs showed elevated WBC of 26.16, Glucose 269, , LA 7, and Beta-hydroxybutyrate 0.6. She was acidotic with ph of 7.282 and AG 21. Rest of the labs unremarkable. CT ab/pel revealed distention of the colon with a large amount of stool and fluid, and a segment of the colon that appeared narrowed with possible thickening of the wall, strongly suspected to be a colonic mass. S/p enema and disimpaction. The patient was given fluids (3 L) and antibiotics and transferred for further evaluation and possible surgery       Interval History: Patient required repeat exploratory lap to stop a bleed. Patient has been stable overnight but shows worsened cognition.         PMHx  Past Medical History:   Diagnosis Date    Anxiety     Celiac disease 2018    Celiac disease     Depression     Diabetes mellitus     Family history of breast cancer in mother      at age 68    Hyperlipidemia     Hypertension     Meniere disease     Menopause     Peptic ulcer     Reflux  esophagitis     Urinary tract infection     Vaginal infection     Vaginal Pap smear 04/07/2014    Pap/Hpv Negative         PSHx  Past Surgical History:   Procedure Laterality Date    APPENDECTOMY      BREAST SURGERY      Tags    CARPAL TUNNEL RELEASE Bilateral 09/2017    ,     COLONOSCOPY  04/2018    Normal  ([facility])     DILATION AND CURETTAGE OF UTERUS  1986    DUPUYTREN CONTRACTURE RELEASE Bilateral 09/2017    HYSTERECTOMY  1987    BEAU w/ appy, no BSO     INJECTION OF NEUROLYTIC AGENT AROUND LUMBAR SYMPATHETIC NERVE N/A 1/6/2022    Procedure: BLOCK, LUMBAR SYMPATHETIC;  Surgeon: Souleymane Rizo Jr., MD;  Location: [facility] PAIN [facility]GT;  Service: Pain Management;  Laterality: N/A;  no pacemaker   pt is diabetic     INJECTION OF NEUROLYTIC AGENT AROUND LUMBAR SYMPATHETIC NERVE N/A 8/25/2022    Procedure: BLOCK, LUMBAR SYMPATHETIC;  Surgeon: Souleymane Rizo Jr., MD;  Location: [facility] PAIN [facility]GT;  Service: Pain Management;  Laterality: N/A;  diabetic     SHOULDER SURGERY  2009 & 2010    right rotator cuff    TONSILLECTOMY      UPPER GASTROINTESTINAL ENDOSCOPY  04/2018       Family History  Family History   Problem Relation Age of Onset    Vision loss Father     Arthritis Father     Breast cancer Mother     Cancer Mother     Vision loss Mother     Hyperlipidemia Sister     Breast cancer Paternal Aunt     Cancer Paternal Aunt     Diabetes Paternal Grandfather     Vision loss Sister     Kidney disease Neg Hx        Social  Social History     Socioeconomic History    Marital status:    Tobacco Use    Smoking status: Never    Smokeless tobacco: Never   Substance and Sexual Activity    Alcohol use: No    Drug use: Never    Sexual activity: Not Currently     Partners: Male     Birth control/protection: See Surgical Hx     Comment: :      Social Determinants of Health     Financial Resource Strain: Low Risk     Difficulty of Paying Living Expenses: Not hard at all   Food Insecurity: No Food Insecurity     Worried About Running Out of Food in the Last Year: Never true    Ran Out of Food in the Last Year: Never true   Transportation Needs: No Transportation Needs    Lack of Transportation (Medical): No    Lack of Transportation (Non-Medical): No   Physical Activity: Sufficiently Active    Days of Exercise per Week: 7 days    Minutes of Exercise per Session: 50 min   Stress: Stress Concern Present    Feeling of Stress : Very much   Social Connections: Unknown    Frequency of Communication with Friends and Family: More than three times a week    Frequency of Social Gatherings with Friends and Family: More than three times a week    Active Member of Clubs or Organizations: Yes    Attends Club or Organization Meetings: More than 4 times per year    Marital Status:    Housing Stability: Low Risk     Unable to Pay for Housing in the Last Year: No    Number of Places Lived in the Last Year: 1    Unstable Housing in the Last Year: No         Allergies  Review of patient's allergies indicates:   Allergen Reactions    Crestor [rosuvastatin] Swelling    Gluten protein        ROS- As per HPI, otherwise negative  Review of Systems   Constitutional:  Negative for chills, fever and malaise/fatigue.   HENT:  Negative for congestion, sinus pain and sore throat.    Eyes:  Negative for blurred vision and discharge.   Respiratory:  Negative for cough and shortness of breath.    Cardiovascular:  Negative for chest pain and leg swelling.   Gastrointestinal:  Positive for abdominal pain. Negative for diarrhea, heartburn, nausea and vomiting.   Genitourinary:  Negative for dysuria and hematuria.   Musculoskeletal:  Negative for back pain.   Skin:  Negative for rash.   Neurological:  Negative for dizziness and headaches.   Psychiatric/Behavioral:  Positive for memory loss. The patient is nervous/anxious.      Objective  Vitals(Most Recent)      Temp: 97.8 °F (36.6 °C) (01/17/23 0726)  Pulse: 72 (01/17/23 0630)  Resp: 15 (01/17/23  0743)  BP: 126/68 (01/17/23 0600)  SpO2: 97 % (01/17/23 0630)           Vitals Range  Temp:  [96.6 °F (35.9 °C)-98.6 °F (37 °C)]   Pulse:  []   Resp:  [10-52]   BP: ()/(43-81)   SpO2:  [93 %-100 %]   Arterial Line BP: ()/(43-68)     I/O  I/O last 3 completed shifts:  In: 5614.7 [I.V.:1066; Blood:3693.5; IV Piggyback:855.3]  Out: 1509 [Urine:25; Other:1484]    Intake/Output Summary (Last 24 hours) at 1/17/2023 0837  Last data filed at 1/17/2023 0600  Gross per 24 hour   Intake 4958.2 ml   Output 1484 ml   Net 3474.2 ml           Physical Exam  Physical Exam  Constitutional:       General: She is not in acute distress.     Appearance: Normal appearance. She is normal weight.   HENT:      Head: Normocephalic and atraumatic.      Comments: Facial laceration on lip and ecchymosis of L eye     Mouth/Throat:      Mouth: Mucous membranes are moist.      Pharynx: Oropharynx is clear.   Eyes:      Extraocular Movements: Extraocular movements intact.      Conjunctiva/sclera: Conjunctivae normal.      Pupils: Pupils are equal, round, and reactive to light.   Cardiovascular:      Rate and Rhythm: Normal rate and regular rhythm.      Pulses: Normal pulses.      Heart sounds: Normal heart sounds.   Pulmonary:      Effort: Pulmonary effort is normal.      Breath sounds: Normal breath sounds.   Abdominal:      General: Abdomen is flat. Bowel sounds are absent. There is no distension.      Palpations: Abdomen is soft.      Tenderness: There is abdominal tenderness in the left lower quadrant. There is no guarding.   Musculoskeletal:      Cervical back: Normal range of motion.      Right lower leg: No edema.      Left lower leg: No edema.   Skin:     General: Skin is warm.      Capillary Refill: Capillary refill takes 2 to 3 seconds.   Neurological:      General: No focal deficit present.      Mental Status: She is alert and oriented to person, place, and time.   Psychiatric:         Mood and Affect: Mood normal.          Behavior: Behavior normal.       Labs  Recent Labs   Lab 01/17/23  0323 01/17/23  0816   WBC 18.05*  --    RBC 3.50*  --    HGB 9.8*  9.8* 10.1*   HCT 26.6*  --    *  --    MCV 76*  --    MCH 28.0  --    MCHC 36.8*  --         Recent Labs   Lab 01/17/23  0323   CALCIUM 7.9*  7.9*   PROT 4.8*   *  132*   K 4.8  4.8   CO2 20*  20*     101   BUN 50*  50*   CREATININE 2.9*  2.9*   ALKPHOS 95   ALT 1,434*   AST 2,049*   BILITOT 1.9*          Lab Results   Component Value Date    INR 1.3 (H) 01/17/2023    INR 1.6 (H) 01/16/2023    INR 1.6 (H) 01/16/2023       Recent Labs     01/14/23  0844            A1c:   Lab Results   Component Value Date    HGBA1C 5.8 (H) 11/01/2022     , Last Gluc:   Recent Labs   Lab 01/16/23  0146 01/16/23  0554 01/16/23  0721 01/16/23  1058 01/16/23  1717 01/16/23  1910   POCTGLUCOSE 197* 213* 192* 169* 211* 194*         TSH:   Lab Results   Component Value Date    TSH 1.821 01/13/2023       UA  Urinalysis        Micro  Microbiology Results (last 7 days)       Procedure Component Value Units Date/Time    Clostridium difficile EIA [[id number]] Collected: 01/14/23 0241    Order Status: Completed Specimen: Stool Updated: 01/16/23 0954     C. diff Antigen Negative     C difficile Toxins A+B, EIA Negative     Comment: Testing not recommended for children <24 months old.                ABG  Recent Labs   Lab 01/14/23  0842 01/14/23  1207 01/16/23  1615   PH 7.142* 7.304* 7.319*   PCO2 49.2* 26.1* 30.7*   PO2 160* 95 74*   HCO3 16.8* 12.9* 15.8*   POCSATURATED 99 97 94*   BE -12 -13 -10         Imaging           Assessment/Plan:  Lily Green is a 72 y.o. female patient w/ PMHx of diabetes type 2 (treated with metformin), HTN, and celiac disease (following a strict diet)     Here with   1. Shock  2. ischemic bowel/ acute abdomen     Neuro/Psych  #AMS, Acute illness delirium  Appears lethargic, slightly confused, but does answer questions  appropriately  H/o of frequent falls  Home Lyrica recently increased.  CTH unremarkable  Minimally responsive on interview  Hypovolemic shock and surgery on 1/17/23  - Continue to monitor  - Consider CT head    CV  #Shock  Poss septic, LA 7 > 6.8  Responsive to fluids  - Pressors held  - Will continue abx  - IV pressor support prn   - F/u echo  - Trend lactate        # Anemia  H/H dropped from 10 to 6.2 overnight  Down from 17 on admit  Initial factors dilution, and blood loss from colectomy 1/4/23  New drop of unknown cause  Bleed stopped in OR  H/H 10.1  PTINR 13.6/1.3  Plan    Follow up coagulopathy work up    Monitor for signs of bleeding    Pulm  On RA no issues  O2 sat 100 on 4L NC     FEN/GI  #Ischemic bowel, secondary to Toxic [place] colon  #Acute Abdomen  Although abdomen non concerning, imaging concerning  Also elevated LA concerning for ischemic bowel  Amylase greatly elevated pointing to ischemia  CT ab/pel revealing distention of the colon with a large amount of stool and fluid, and a segment of the colon that appeared narrowed with possible thickening of the wall, strongly suspected to be a colonic mass  Surgery s/p colon resection revealed no evidence of mass  No evidence of prior CV disease   Etiology unknown  Bladder pressure wnl  ABG ph 7.319 PCO2 30.7, O2 74 Bicarb 15.8  Gen surg consulted  - S/p repeat ex lap for bleed  - Diet: Clear liquids, gluten free, low sugar, no artificial sweeteners   - Keep Mg 2, K 4  - Zofran prn for n/v   - F/u Gen surg recs  - maintain euvolemia  - Dietary concerns tube feeds    Shock Liver    Monitor AST/ALT  Repeat US to assess for cholecystitis    #hypocalcemia  Stable, monitor        RENAL  #HINA  Likely 2/2 to shock  BUN/Cr: 58/1.7 > 60/1.9  UOP: minimal despite aggressive IVF  - HD on 1/14/23  -  in 24 hr  -Worsened BUN 62/4.1  -Phos 8.5    Plan  Strict I&Os  Avoid renal toxic meds   Continue to monitor renal status and urine output   Repeat HD 1/16/23    dc  bradford  Bladder scans    Heme  Status post bowel resection  H/H down from 17.2/50.7 on admit  10/27.9  WBC increased 13 >18 monitor  PT/INR 16.4/1.6  DVT prophylaxis: lovenox 30mg      # Anemia  H/H dropped from 10 to 6.2 overnight  Down from 17 on admit  Initial factors dilution, and blood loss from colectomy 1/4/23  New drop of unknown cause  No free fluid visualized on Bedside abd US  Plan    Repeat H/h after transfusion    Monitor signs of bleeding, bruising, bloody ostomy output    Follow up coagulopathy work up            Endo  #possible starvation ketoacidosis  #T2DM   Elevated Bgs, AG, and slight B-hydrox  - Supportive management  - Maintain glucose 140-180  - IVF prn  - SSI and long acting  - Holding AM levemir in setting of repeat low BG  - F/u BMP      ID  Gram negative Bacteremia  - Gram negative rods reported in Blood culture  - Continue Cefepime, flagyl  - Blood culture ordered           DVT PPx: SCD, transition to Lovenox when appropriate  Diet: NPO  GI PPX: None  Deconditioning: PT/OT  Code Status: Full        Dispo  - S/p bowel resection, Will f/u with surg recs. Continue current management for now      1/17/2023 Bal Burr M.D., -II  [facility]

## 2023-01-17 NOTE — PT/OT/SLP PROGRESS
Occupational Therapy      Patient Name:  Lily Green   MRN:  [id number]    Patient not seen today secondary to Nurse/ ALYSSIA hold. Will follow-up as able. Of note, OT orders unreviewed on transfer. Will follow up with medical staff to review orders as appropriate.     1/17/2023

## 2023-01-17 NOTE — CONSULTS
"  [facility] - [facility]  Adult Nutrition  Consult Note    SUMMARY     Recommendations    Recommendation:  1. When medically acceptable, initiate trickle TF of Diabetisource AC at 10ml/hr. Advance as tolerated to goal rate of 60ml/hr which would provide 1728 kcal, 86g protein, & 1175ml free water.   2. Monitor weight/labs.   3. RD to follow to monitor nutrition status    Goals:  TF will be started by RD follow up  Nutrition Goal Status: new  Communication of RD Recs: reviewed with RN (Edwina)    Assessment and Plan  * Peritonitis  Contributing Nutrition Diagnosis  Inadequate energy intake    Related to (etiology):   Dx, s/p surgery    Signs and Symptoms (as evidenced by):   Unable to consume po diet, NG tube placed     Interventions:  Collaboration with other providers    Nutrition Diagnosis Status:   New      Malnutrition Assessment  Unable to assess NFPE at this time      Reason for Assessment  Reason For Assessment: consult (TF recs)  Diagnosis:  (peritonitis)  Relevant Medical History: HTN, peptic ulcer, HLD, celiac disease, DM, appendectomy, hysterectomy  General Information Comments: Pt on Clear Liquid low sugar gluten free diet. RD reports pt not conusming po diet. NG tube in place. Consult received for TF. Central line. Started on HD. Noted weight stable x 6 months. Varun 15-abd incision. s/p expl lap 1/4 and expl lap with colostomy closure/creation 1/16. Unable to assess NFPE at visit  Nutrition Discharge Planning: d/c needs to be determined    Nutrition Risk Screen  Nutrition Risk Screen: no indicators present    Nutrition/Diet History  Food Preferences: gluten free  Factors Affecting Nutritional Intake: altered gastrointestinal function    Anthropometrics  Temp: 98.8 °F (37.1 °C)  Height Method: Stated  Height: 5' 5" (165.1 cm)  Height (inches): 65 in  Weight Method: Bed Scale  Weight: 60 kg (132 lb 4.4 oz)  Weight (lb): 132.28 lb  Ideal Body Weight (IBW), Female: 125 lb  % Ideal Body Weight, Female " (lb): 105.82 %  BMI (Calculated): 22  BMI Grade: 18.5-24.9 - normal  Usual Body Weight (UBW), k.1 kg ()  % Usual Body Weight: 103.49  % Weight Change From Usual Weight: 3.27 %     Lab/Procedures/Meds  Pertinent Labs Reviewed: reviewed  Pertinent Labs Comments: Na 132L, BUN 50H, Crea 2.9H, Ca 7.9L, Phos 5.3H, Alb 2.7L  Pertinent Medications Reviewed: reviewed  Pertinent Medications Comments: insulin, norepinephrine, vasopressin    Estimated/Assessed Needs  Weight Used For Calorie Calculations: 60 kg (132 lb 4.4 oz)  Energy Calorie Requirements (kcal): 1800 (30 kcal/kg)  Energy Need Method: Kcal/kg  Protein Requirements: 72g (1.2g/kg)  Weight Used For Protein Calculations: 60 kg (132 lb 4.4 oz)  Estimated Fluid Requirement Method: RDA Method  RDA Method (mL): 1800  CHO Requirement: 200g    Nutrition Prescription Ordered  Current Diet Order: Clear Liquid sugar free gluten free    Evaluation of Received Nutrient/Fluid Intake  I/O: 5145/1484  Energy Calories Required: not meeting needs  Protein Required: not meeting needs  Fluid Required: not meeting needs  Comments: LBM 1/15  % Intake of Estimated Energy Needs: 0 - 25 %  % Meal Intake: 0 - 25 %    Nutrition Risk  Level of Risk/Frequency of Follow-up:  (2xweekly)     Monitor and Evaluation  Food and Nutrient Intake: enteral nutrition intake  Food and Nutrient Adminstration: diet order, enteral and parenteral nutrition administration  Physical Activity and Function: nutrition-related ADLs and IADLs  Anthropometric Measurements: weight  Biochemical Data, Medical Tests and Procedures: electrolyte and renal panel  Nutrition-Focused Physical Findings: overall appearance     Nutrition Follow-Up  RD Follow-up?: Yes

## 2023-01-17 NOTE — SUBJECTIVE & OBJECTIVE
Interval History:   No acute issues overnight. Afebrile. VSS since OR takeback, off pressors since return from OR  Pain controlled   no nausea / vomiting  Ostomy with bowel sweat  Tolerating dialysis      Medications:  Continuous Infusions:   sodium chloride 0.9% Stopped (01/16/23 1015)    NORepinephrine bitartrate-D5W Stopped (01/16/23 1241)    vasopressin 0.04 Units/min (01/16/23 1002)     Scheduled Meds:   ceFEPime (MAXIPIME) IVPB  2 g Intravenous Q24H    chlorhexidine  15 mL Mouth/Throat BID    famotidine (PF)  20 mg Intravenous Daily    insulin detemir U-100  10 Units Subcutaneous BID    metronidazole  500 mg Intravenous Q8H    mupirocin   Nasal BID     PRN Meds:sodium chloride, sodium chloride, sodium chloride, sodium chloride, sodium chloride, sodium chloride, sodium chloride, sodium chloride, sodium chloride, sodium chloride, sodium chloride, acetaminophen, dextrose 10%, dicyclomine, glucagon (human recombinant), hydrALAZINE, HYDROcodone-acetaminophen, HYDROmorphone, insulin aspart U-100, LIDOcaine, magnesium sulfate IVPB, melatonin, ondansetron, promethazine, sodium chloride 0.9%, sodium chloride 0.9%, sodium phosphate IVPB, sodium phosphate IVPB, sodium phosphate IVPB     Review of patient's allergies indicates:   Allergen Reactions    Crestor [rosuvastatin] Swelling    Gluten protein      Objective:     Vital Signs (Most Recent):  Temp: 98.4 °F (36.9 °C) (01/17/23 0330)  Pulse: 72 (01/17/23 0630)  Resp: 12 (01/17/23 0630)  BP: 126/68 (01/17/23 0600)  SpO2: 97 % (01/17/23 0630) Vital Signs (24h Range):  Temp:  [96.6 °F (35.9 °C)-98.6 °F (37 °C)] 98.4 °F (36.9 °C)  Pulse:  [] 72  Resp:  [10-52] 12  SpO2:  [93 %-100 %] 97 %  BP: ()/(43-81) 126/68  Arterial Line BP: ()/(43-68) 156/56     Weight: 60 kg (132 lb 4.4 oz)  Body mass index is 22.01 kg/m².    Intake/Output - Last 3 Shifts         01/15 0700 01/16 0659 01/16 0700 01/17 0659    I.V. (mL/kg) 282.3 (4.7) 790.6 (13.2)    Blood  3693.5     IV Piggyback 584.7 661.6    Total Intake(mL/kg) 867 (14.5) 5145.7 (85.8)    Urine (mL/kg/hr) 55 (0) 0 (0)    Other  1484    Stool 0     Total Output 55 1484    Net +812 +3661.7          Stool Occurrence 0 x             Physical Exam  Vitals and nursing note reviewed.   Constitutional:       General: She is not in acute distress.  Cardiovascular:      Rate and Rhythm: Normal rate and regular rhythm.   Pulmonary:      Effort: Pulmonary effort is normal. No respiratory distress.   Abdominal:      General: Abdomen is flat.      Palpations: Abdomen is soft.      Tenderness: There is abdominal tenderness (appropriate post op tenderness).      Comments: Midline incision cdi with dressing in place    Ostomy patent with small areas of discoloration with bowel sweat   Skin:     General: Skin is warm and dry.   Neurological:      Mental Status: She is alert.      Comments: No response to questions       Significant Labs:  I have reviewed all pertinent lab results within the past 24 hours.  CBC:   Recent Labs   Lab 01/17/23  0323   WBC 18.05*   RBC 3.50*   HGB 9.8*  9.8*   HCT 26.6*   *   MCV 76*   MCH 28.0   MCHC 36.8*     CMP:   Recent Labs   Lab 01/17/23  0323   GLU 95  95   CALCIUM 7.9*  7.9*   ALBUMIN 2.7*  2.7*   PROT 4.8*   *  132*   K 4.8  4.8   CO2 20*  20*     101   BUN 50*  50*   CREATININE 2.9*  2.9*   ALKPHOS 95   ALT 1,434*   AST 2,049*   BILITOT 1.9*       Significant Diagnostics:  I have reviewed all pertinent imaging results/findings within the past 24 hours.

## 2023-01-17 NOTE — PLAN OF CARE
Recommendation:  1. When medically acceptable, initiate trickle TF of Diabetisource AC at 10ml/hr. Advance as tolerated to goal rate of 60ml/hr which would provide 1728 kcal, 86g protein, & 1175ml free water.   2. Monitor weight/labs.   3. RD to follow to monitor nutrition status    Goals:  TF will be started by RD follow up  Nutrition Goal Status: new

## 2023-01-18 LAB
ALBUMIN SERPL BCP-MCNC: 2.4 G/DL (ref 3.5–5.2)
ALP SERPL-CCNC: 112 U/L (ref 55–135)
ALT SERPL W/O P-5'-P-CCNC: 1339 U/L (ref 10–44)
ANION GAP SERPL CALC-SCNC: 9 MMOL/L (ref 8–16)
ANISOCYTOSIS BLD QL SMEAR: ABNORMAL
AST SERPL-CCNC: 1443 U/L (ref 10–40)
BASOPHILS NFR BLD: 0 % (ref 0–1.9)
BILIRUB SERPL-MCNC: 1.1 MG/DL (ref 0.1–1)
BLD PROD TYP BPU: NORMAL
BLOOD UNIT EXPIRATION DATE: NORMAL
BLOOD UNIT TYPE CODE: 6200
BLOOD UNIT TYPE: NORMAL
BUN SERPL-MCNC: 33 MG/DL (ref 8–23)
BURR CELLS BLD QL SMEAR: ABNORMAL
CALCIUM SERPL-MCNC: 7.6 MG/DL (ref 8.7–10.5)
CHLORIDE SERPL-SCNC: 102 MMOL/L (ref 95–110)
CO2 SERPL-SCNC: 23 MMOL/L (ref 23–29)
CODING SYSTEM: NORMAL
CREAT SERPL-MCNC: 2.8 MG/DL (ref 0.5–1.4)
DACRYOCYTES BLD QL SMEAR: ABNORMAL
DIFFERENTIAL METHOD: ABNORMAL
DISPENSE STATUS: NORMAL
EOSINOPHIL NFR BLD: 0 % (ref 0–8)
ERYTHROCYTE [DISTWIDTH] IN BLOOD BY AUTOMATED COUNT: 19.7 % (ref 11.5–14.5)
EST. GFR  (NO RACE VARIABLE): 17 ML/MIN/1.73 M^2
GIANT PLATELETS BLD QL SMEAR: PRESENT
GLUCOSE SERPL-MCNC: 62 MG/DL (ref 70–110)
HCT VFR BLD AUTO: 24.7 % (ref 37–48.5)
HGB BLD-MCNC: 9 G/DL (ref 12–16)
HYPOCHROMIA BLD QL SMEAR: ABNORMAL
IMM GRANULOCYTES # BLD AUTO: ABNORMAL K/UL (ref 0–0.04)
IMM GRANULOCYTES NFR BLD AUTO: ABNORMAL % (ref 0–0.5)
LYMPHOCYTES NFR BLD: 10 % (ref 18–48)
MAGNESIUM SERPL-MCNC: 2.2 MG/DL (ref 1.6–2.6)
MCH RBC QN AUTO: 28.5 PG (ref 27–31)
MCHC RBC AUTO-ENTMCNC: 36.4 G/DL (ref 32–36)
MCV RBC AUTO: 78 FL (ref 82–98)
MONOCYTES NFR BLD: 14 % (ref 4–15)
NEUTROPHILS NFR BLD: 76 % (ref 38–73)
NRBC BLD-RTO: 0 /100 WBC
NUM UNITS TRANS FFP: NORMAL
NUM UNITS TRANS PACKED RBC: NORMAL
OVALOCYTES BLD QL SMEAR: ABNORMAL
PATH REV BLD -IMP: NORMAL
PHOSPHATE SERPL-MCNC: 3.4 MG/DL (ref 2.7–4.5)
PLATELET # BLD AUTO: 111 K/UL (ref 150–450)
PLATELET BLD QL SMEAR: ABNORMAL
PMV BLD AUTO: 9.3 FL (ref 9.2–12.9)
POCT GLUCOSE: 102 MG/DL (ref 70–110)
POCT GLUCOSE: 83 MG/DL (ref 70–110)
POCT GLUCOSE: 96 MG/DL (ref 70–110)
POIKILOCYTOSIS BLD QL SMEAR: SLIGHT
POLYCHROMASIA BLD QL SMEAR: ABNORMAL
POTASSIUM SERPL-SCNC: 3.8 MMOL/L (ref 3.5–5.1)
PROT SERPL-MCNC: 4.6 G/DL (ref 6–8.4)
RBC # BLD AUTO: 3.16 M/UL (ref 4–5.4)
SODIUM SERPL-SCNC: 134 MMOL/L (ref 136–145)
UNIT NUMBER: NORMAL
WBC # BLD AUTO: 19.6 K/UL (ref 3.9–12.7)

## 2023-01-18 PROCEDURE — 25000003 PHARM REV CODE 250: Performed by: INTERNAL MEDICINE

## 2023-01-18 PROCEDURE — 80053 COMPREHEN METABOLIC PANEL: CPT | Performed by: STUDENT IN AN ORGANIZED HEALTH CARE EDUCATION/TRAINING PROGRAM

## 2023-01-18 PROCEDURE — 25000003 PHARM REV CODE 250: Performed by: SURGERY

## 2023-01-18 PROCEDURE — S0030 INJECTION, METRONIDAZOLE: HCPCS | Performed by: SURGERY

## 2023-01-18 PROCEDURE — 63600175 PHARM REV CODE 636 W HCPCS: Performed by: INTERNAL MEDICINE

## 2023-01-18 PROCEDURE — 97110 THERAPEUTIC EXERCISES: CPT

## 2023-01-18 PROCEDURE — 97162 PT EVAL MOD COMPLEX 30 MIN: CPT

## 2023-01-18 PROCEDURE — 63600175 PHARM REV CODE 636 W HCPCS: Performed by: STUDENT IN AN ORGANIZED HEALTH CARE EDUCATION/TRAINING PROGRAM

## 2023-01-18 PROCEDURE — 97530 THERAPEUTIC ACTIVITIES: CPT

## 2023-01-18 PROCEDURE — 83735 ASSAY OF MAGNESIUM: CPT | Performed by: STUDENT IN AN ORGANIZED HEALTH CARE EDUCATION/TRAINING PROGRAM

## 2023-01-18 PROCEDURE — 84100 ASSAY OF PHOSPHORUS: CPT | Performed by: STUDENT IN AN ORGANIZED HEALTH CARE EDUCATION/TRAINING PROGRAM

## 2023-01-18 PROCEDURE — 97535 SELF CARE MNGMENT TRAINING: CPT

## 2023-01-18 PROCEDURE — 85027 COMPLETE CBC AUTOMATED: CPT | Performed by: STUDENT IN AN ORGANIZED HEALTH CARE EDUCATION/TRAINING PROGRAM

## 2023-01-18 PROCEDURE — 25000003 PHARM REV CODE 250: Performed by: STUDENT IN AN ORGANIZED HEALTH CARE EDUCATION/TRAINING PROGRAM

## 2023-01-18 PROCEDURE — 27000221 HC OXYGEN, UP TO 24 HOURS

## 2023-01-18 PROCEDURE — 97165 OT EVAL LOW COMPLEX 30 MIN: CPT

## 2023-01-18 PROCEDURE — 94761 N-INVAS EAR/PLS OXIMETRY MLT: CPT

## 2023-01-18 PROCEDURE — 85007 BL SMEAR W/DIFF WBC COUNT: CPT | Performed by: STUDENT IN AN ORGANIZED HEALTH CARE EDUCATION/TRAINING PROGRAM

## 2023-01-18 PROCEDURE — 51798 US URINE CAPACITY MEASURE: CPT

## 2023-01-18 PROCEDURE — 20000000 HC ICU ROOM

## 2023-01-18 RX ORDER — HEPARIN SODIUM 5000 [USP'U]/ML
5000 INJECTION, SOLUTION INTRAVENOUS; SUBCUTANEOUS EVERY 8 HOURS
Status: DISCONTINUED | OUTPATIENT
Start: 2023-01-18 | End: 2023-01-24

## 2023-01-18 RX ADMIN — METRONIDAZOLE 500 MG: 5 INJECTION, SOLUTION INTRAVENOUS at 12:01

## 2023-01-18 RX ADMIN — MUPIROCIN: 20 OINTMENT TOPICAL at 08:01

## 2023-01-18 RX ADMIN — HYDROMORPHONE HYDROCHLORIDE 0.5 MG: 1 INJECTION, SOLUTION INTRAMUSCULAR; INTRAVENOUS; SUBCUTANEOUS at 12:01

## 2023-01-18 RX ADMIN — METRONIDAZOLE 500 MG: 5 INJECTION, SOLUTION INTRAVENOUS at 08:01

## 2023-01-18 RX ADMIN — HYDROCODONE BITARTRATE AND ACETAMINOPHEN 1 TABLET: 5; 325 TABLET ORAL at 08:01

## 2023-01-18 RX ADMIN — METRONIDAZOLE 500 MG: 5 INJECTION, SOLUTION INTRAVENOUS at 04:01

## 2023-01-18 RX ADMIN — HEPARIN SODIUM 5000 UNITS: 5000 INJECTION INTRAVENOUS; SUBCUTANEOUS at 09:01

## 2023-01-18 RX ADMIN — HYDROMORPHONE HYDROCHLORIDE 0.5 MG: 1 INJECTION, SOLUTION INTRAMUSCULAR; INTRAVENOUS; SUBCUTANEOUS at 04:01

## 2023-01-18 RX ADMIN — DEXTROSE 125 ML: 10 SOLUTION INTRAVENOUS at 04:01

## 2023-01-18 RX ADMIN — HEPARIN SODIUM 5000 UNITS: 5000 INJECTION INTRAVENOUS; SUBCUTANEOUS at 02:01

## 2023-01-18 RX ADMIN — CEFEPIME 2 G: 2 INJECTION, POWDER, FOR SOLUTION INTRAVENOUS at 11:01

## 2023-01-18 NOTE — PLAN OF CARE
The sw met with the pt and Deric Green(spouse)[phone] who was at bedside during the assessment. The pt was very groggy so her spouse answered the questions. The pt and her spouse are still legally  but are living apart. The pt's spouse has a home a couple of miles away from her home. He recently moved back in to her home with her for a short period of time b/c he had a hip replacement 10/27/22 and she was taking care of him. The pt still drives but her spouse will transport her home at d/c if she goes home. The pt's independent with her adl's and has a glucometer. The sw completed the white board in the pt's room and gave her spouse a d/c pamphlet with her name and contact info on it. The sw encouraged him to call if he has any further questions or concerns. The sw will continue to follow the pt throughout her transitions of care and will assist with any d/c needs.        01/17/23 0830   Discharge Assessment   Assessment Type Discharge Planning Assessment   Confirmed/corrected address, phone number and insurance Yes   Confirmed Demographics Correct on Facesheet   Source of Information family   When was your last doctors appointment?   (a few weeks ago)   Communicated CAMPBELL with patient/caregiver Date not available/Unable to determine   Reason For Admission Peritonitis   People in Home spouse   Do you expect to return to your current living situation? Other (see comments)  (TBD)   Do you have help at home or someone to help you manage your care at home? Yes   Who are your caregiver(s) and their phone number(s)? Deric Green(spouse)[phone]   Prior to hospitilization cognitive status: Alert/Oriented   Current cognitive status: Unable to Assess  (pt was groggy)   Home Accessibility wheelchair accessible   Home Layout Able to live on 1st floor   Equipment Currently Used at Home glucometer   Readmission within 30 days? No   Patient currently being followed by outpatient case management? No   Do you currently  have service(s) that help you manage your care at home? No   Do you take prescription medications? Yes   Do you have prescription coverage? Yes   Coverage [employer]   Do you have any problems affording any of your prescribed medications? No  (the pt receives her meds affordably at [facility] in [place])   Is the patient taking medications as prescribed? yes   Who is going to help you get home at discharge? Deric Green(spouse)[phone]   How do you get to doctors appointments? car, drives self   Are you on dialysis? No   Do you take coumadin? No   Discharge Plan A Home Health   Discharge Plan B Other  (TBD)   DME Needed Upon Discharge  other (see comments)  (TBD)   Discharge Plan discussed with: Spouse/sig other   Name(s) and Number(s) Deric Green(spouse)[phone]   Discharge Barriers Identified None   OTHER   Name(s) of People in Home Deric Green(spouse)[phone]

## 2023-01-18 NOTE — PT/OT/SLP EVAL
Occupational Therapy   Evaluation and Treatment    Name: Lily Green  MRN: [id number]  Admitting Diagnosis: Peritonitis  Recent Surgery: Procedure(s) (LRB):  LAPAROTOMY, EXPLORATORY (N/A)  CLOSURE, COLOSTOMY (Left)  CREATION, COLOSTOMY (Right) 2 Days Post-Op    Recommendations:     Discharge Recommendations: other (see comments) (tbd; post acute therapy recommended; continued assessment needed to narrow d/c disposition recommendations)  Discharge Equipment Recommendations:  other (see comments) (TBD)  Barriers to discharge:  Other (Comment) (limited to bed level activity only)    Assessment:     Lily Green is a 72 y.o. female with a medical diagnosis of Peritonitis.  She presents with ..The primary encounter diagnosis was Peritonitis [K65.9 (ICD-10-CM)]. Diagnoses of Submucosal colonic lesion, Shock, Large bowel ischemia, ATN (acute tubular necrosis), HINA (acute kidney injury), and Acute hypoxemic respiratory failure were also pertinent to this visit.  . Performance deficits affecting function: weakness, impaired endurance, impaired sensation, impaired self care skills, impaired functional mobility, gait instability, impaired balance, pain, impaired cardiopulmonary response to activity, decreased safety awareness, decreased coordination, impaired cognition, visual deficits, decreased ROM, impaired coordination, decreased upper extremity function, decreased lower extremity function.      OT evaluation completed, co-evaluation with PT in ICU 2/2 complexity.  Patient with presentation of lethargy, preferred left cervical rotated positioning and preferred left to midline visual gaze (although able to cross midline to right with cueing), and overall significant weakness (LUE strength grossly at 2-/5 - 2+/5) specifically in RUE (slight inconsistent R hand movement but no volitional or on command movement of R shoulder/elbow during eval/tx). Patient's mentation limited to oriented to self, following  ~25% of commands, using gestures inconsistently, and very minimal verbalizations. Limited assessment due to fatigue and variance of participation levels. At this time requires dependence of 2 for repositioning and max-total for bed level ADL. Anticipate when medically stable, post acute therapy will be needed. More specified discharge disposition and DME recommendations to be provided pending progression and ongoing assessment of this patient.    Rehab Prognosis: Good; patient would benefit from acute skilled OT services to address these deficits and reach maximum level of function.       Plan:     Patient to be seen 3 x/week to address the above listed problems via self-care/home management, therapeutic activities, therapeutic exercises, neuromuscular re-education  Plan of Care Expires: 02/15/23  Plan of Care Reviewed with: patient, spouse    Subjective     Chief Complaint: grimaces occasionally during assessment but unable to localize pain  Patient/Family Comments/goals: Patient with minimal verbalizations during evaluation, closing eyes intermittently. Patient's spouse reports he is hopeful she can improve in her functionality because usually she is very independent.    Occupational Profile: achieved via chart review, attempts for interviewing patient, and primarily through interview with spouse  Living Environment: Patient resides alone in a single story home with a threshold to enter with tub shower combo and walk in shower. Small dog in the home. Patient's spouse lives in a separate home a few miles down the road in a single story home, threshold to enter, with tub shower combo.   Previous level of function: Prior to admission, patient was independent in all areas of occupation including self care, home management, driving, shopping, etc. Patient's spouse was living at patient's home as was more spacious and accessible since he had a hip replacement in fall of 2022.  Roles and Routines: retired  teacher  Equipment Used at Home: bedside commode  Assistance upon Discharge: Patient will have assistance of spouse whom is now per report returned to driving and participating in his own ADLs/IADLs modified independently. Of note, patient's spouse is using a walker at this time and thus may potentially not be able to provide physical assistance to patient    Pain/Comfort:  Pain Rating 1: other (see comments) (shakes head no; presents to be in slight discomfort at rest grossly)  Pain Addressed 1: Pre-medicate for activity, Reposition, Nurse notified, Cessation of Activity  Pain Rating Post-Intervention 1: other (see comments) (occasionally grimaces in response to assisted ROM movements to UEs)      Objective:     Communicated with: nursing prior to session.  Patient found HOB elevated with central line, restraints, telemetry, blood pressure cuff, peripheral IV, arterial line, NG tube, Other (comments) (ICU level monitoring) upon OT entry to room.    General Precautions: Standard, fall  Orthopedic Precautions: N/A  Braces: N/A  Respiratory Status: Room air    Occupational Performance:    Bed Mobility:    Patient completed Rolling/Turning to Right with total assist  Patient completed scooting upwards in bed with total assist of 2 via drawsheet    Functional Mobility/Transfers:  Functional Mobility: 2/2 fluctuating participation unable to safely facilitate transitional movements to eob and thus unable to attempt out of bed mobility efforts    Activities of Daily Living:  Feeding:  max assist to facilitate hand to mouth motion (no PO provided) on command with Left nondominant hand; no attempt to use dominant right hand (*OT communicated to ICU RN  whom communicated to MD who also assessed patient)  Grooming: max assist, hand under hand assist with left hand  Did not assess other ADLs - dependence on staff    Cognitive/Visual Perceptual:  Cognitive/Psychosocial Skills:     -       Oriented to: person; unable to choose  "location with multiple choices   -       Follows Commands/attention:flucutating attention; follows ~25%of commands; looks toward  during session  -       Communication: minimal verbalizations; reports "tired"; occasionally closes eyes requiring alerting cues; performs occasional gesturing  -       Memory: Impaired STM and Impaired LTM  -       Safety awareness/insight to disability: impaired; in restraints at therapists's coentry - RN in agreement to remove restraints and keep restraints off at therapists's exit; patient with limited insight but without behaviors of pulling at lines   Visual/Perceptual:      -preferred gaze toward left; volitionally tracking midline to left; requiring max cueing to facilitate tracking to the right (MD aware and entered room to assess patient; patient then with some tracking to the right)    Physical Exam:  Postural examination/scapula alignment:    -       Rounded shoulders  -      left cervical lateral rotation  Skin integrity: ostomy; facial laceration noted  Sensation: difficult to assess 2/2 mentation; however, spouse reports that patient has a prior hx of neuropathy in B feet  Motor Planning: impaired RUE ; volitionally moves LUE/ BLE  Dominant hand: Right  Upper Extremity Range of Motion:    RUE ROM: impaired; with exception of very minimal movement of R hand inconsistently patient without active purposeful RUE movement. PROM WFL; hx of repaired R rotator cuff surgery and B Dupuytren contracture release  LUE ROM: WFL  Upper Extremity Strength: impaired  R hand/wrist: trace 1/5; 0/5 R elbow / shoulder  LUE grossly at 2-/5 - 2+/5   Strength: R: trace; L: fair  Gross motor: impaired    AMPAC 6 Click ADL:  AMPAC Total Score: 12    Treatment & Education:  Patient supine with HOB elevated in semi left sidelying position. Education provided to patient and patient's spouse in layman's terms for role of OT in hospital. Cotreatment with PT due to patient's complexity and " apparent lethargic presentation. Extended time, effort, and collaboration with PT, RN, and also MDs whom entered in room for assessment/treatment of patient. Extra time provided to patient during all above functional tasks/ assessment tasks for processing. Vitals monitored during session with heartrate in low to high 90s with minimal activity and BP stable. Initiated education with patient/patient's spouse regarding role of therapy post hospitalization. Gentle BUE ROM provided. Requested spouse to bring in items familiar to patient such as her pillow (to improve rest) and other items to decrease risk of delirium.    Patient left HOB elevated with all lines intact, call button in reach, bed alarm on, nursing notified, and spouse present    GOALS:   Multidisciplinary Problems       Occupational Therapy Goals          Problem: Occupational Therapy    Goal Priority Disciplines Outcome Interventions   Occupational Therapy Goal     OT, PT/OT Ongoing, Progressing    Description: Goals to be met by: 2/15/2023     Patient will increase functional independence with ADLs by performing:    Feeding with min assist and incorporation of upright seated posture with incorporation of conservation techniques  UE Dressing with Moderate Assistance with easy on overhead clothing  Grooming while seated with Minimal Assistance.  Rolling bilaterally with moderate assist to increased ability to self-reposition to decrease pressure injury and to decrease burden of care during pericare  Sitting eob with min assist x8 minutes without adverse vital signs  Bed < > BSC transfer with moderate assist via least restrictive method  Increase BUE MMT grossly to 3+/5 to integrate dominant UE into daily living tasks                         History:     Past Medical History:   Diagnosis Date    Anxiety     Celiac disease 2018    Celiac disease     Depression     Diabetes mellitus     Family history of breast cancer in mother      at age 68     Hyperlipidemia     Hypertension     Meniere disease     Menopause     Peptic ulcer     Reflux esophagitis     Urinary tract infection     Vaginal infection     Vaginal Pap smear 04/07/2014    Pap/Hpv Negative          Past Surgical History:   Procedure Laterality Date    APPENDECTOMY      BREAST SURGERY      Tags    CARPAL TUNNEL RELEASE Bilateral 09/2017    ,     CLOSURE, COLOSTOMY Left 1/16/2023    Procedure: CLOSURE, COLOSTOMY;  Surgeon: Manuel Javier MD;  Location: [facility] OR;  Service: General;  Laterality: Left;    COLONOSCOPY  04/2018    Normal  ([facility])     COLOSTOMY Right 1/16/2023    Procedure: CREATION, COLOSTOMY;  Surgeon: Manuel Javier MD;  Location: [facility] OR;  Service: General;  Laterality: Right;    DILATION AND CURETTAGE OF UTERUS  1986    DUPUYTREN CONTRACTURE RELEASE Bilateral 09/2017    HYSTERECTOMY  1987    BEAU w/ appy, no BSO     INJECTION OF NEUROLYTIC AGENT AROUND LUMBAR SYMPATHETIC NERVE N/A 1/6/2022    Procedure: BLOCK, LUMBAR SYMPATHETIC;  Surgeon: Souleymane Rizo Jr., MD;  Location: [facility] PAIN MGT;  Service: Pain Management;  Laterality: N/A;  no pacemaker   pt is diabetic     INJECTION OF NEUROLYTIC AGENT AROUND LUMBAR SYMPATHETIC NERVE N/A 8/25/2022    Procedure: BLOCK, LUMBAR SYMPATHETIC;  Surgeon: Souleymane Rizo Jr., MD;  Location: [facility] PAIN MGT;  Service: Pain Management;  Laterality: N/A;  diabetic     LAPAROTOMY, EXPLORATORY N/A 1/14/2023    Procedure: LAPAROTOMY, EXPLORATORY;  Surgeon: Manuel Javier MD;  Location: [facility] OR;  Service: General;  Laterality: N/A;    LAPAROTOMY, EXPLORATORY N/A 1/16/2023    Procedure: LAPAROTOMY, EXPLORATORY;  Surgeon: Manuel Javier MD;  Location: [facility] OR;  Service: General;  Laterality: N/A;    SHOULDER SURGERY  2009 & 2010    right rotator cuff    TONSILLECTOMY      UPPER GASTROINTESTINAL ENDOSCOPY  04/2018       Time Tracking:     OT Date of Treatment: 01/18/23  OT Start Time: 1013  OT Stop Time: 1105  OT Total Time (min): 52  min ; co eval/tx with PT    Billable Minutes:Evaluation 14 min  Self Care/Home Management 15 min  Therapeutic Activity 15 min  Therapeutic Exercise 8 min    1/18/2023

## 2023-01-18 NOTE — PROGRESS NOTES
CT contacted for estimated time of STAT CT that was placed. Per CT patient can come now. Awaiting transport.

## 2023-01-18 NOTE — PT/OT/SLP EVAL
Physical Therapy Evaluation    Patient Name:  Lily Green   MRN:  [id number]    Recommendations:     Discharge Recommendations:  (TBD pending progress)   Discharge Equipment Recommendations:  (TBD)   Barriers to discharge:  Patient requiring significant assist with bed mobility; not progressed to sitting    Assessment:     Lily Green is a 72 y.o. female admitted with a medical diagnosis of Peritonitis.  She presents with the following impairments/functional limitations: weakness, impaired endurance, impaired sensation, impaired self care skills, impaired functional mobility, gait instability, impaired balance, impaired cognition, decreased coordination, decreased upper extremity function, decreased lower extremity function, visual deficits, pain, impaired coordination, decreased ROM, impaired fine motor Patient evaluated to patient tolerance with OT; pt oriented to name, minimal tracking to midline throughout most of tx then past midline for MD with turning head past midline; primarily forward gaze with head turning from L to midline; pt able to volitionally move RLE >LLE and inconsistent upon command; able to fully df/pf; RUE with minimal and inconsistent shld/elbow movement, but no movement volitionally or upon command of R hand; volitional movement and inconsistent upon command for LUE; command following ~25% overall; dependent with bed mobility to [facility] via drawsheet; pt very tired, and only communicating with head nods and very occasional one word answer; BLE AA/PROM provided; will progress as tolerated; d/c recs ongoing pending progress..    Rehab Prognosis: Fair to Good; patient would benefit from acute skilled PT services to address these deficits and reach maximum level of function.    Recent Surgery: Procedure(s) (LRB):  LAPAROTOMY, EXPLORATORY (N/A)  CLOSURE, COLOSTOMY (Left)  CREATION, COLOSTOMY (Right) 2 Days Post-Op    Plan:     During this hospitalization, patient to be  "seen 3 x/week to address the identified rehab impairments via gait training, therapeutic activities, therapeutic exercises, neuromuscular re-education and progress toward the following goals:    Plan of Care Expires:  02/18/23    Subjective     Chief Complaint: Patient primarily non-verbal  Patient/Family Comments/goals:  wants patient to get stronger and be able to return home  Pain/Comfort:  Pain Rating 1:  (pt nods head "no"; appears in intermittent discomfort)  Pain Addressed 1: Pre-medicate for activity, Reposition, Nurse notified, Cessation of Activity  Pain Rating Post-Intervention 1:  (occasionally grimacing to assisted UE movement)    Patients cultural, spiritual, Synagogue conflicts given the current situation: no    Living Environment:  Patient resides alone in a single story home with a threshold to enter with tub shower combo and walk in shower. Small dog in the home. Patient's spouse lives in a separate home a few miles down the road in a single story home, threshold to enter, with tub shower combo.   Previous level of function: Prior to admission, patient was independent in all areas of occupation including self care, home management, driving, shopping, etc. Patient's spouse was living at patient's home as was more spacious and accessible since he had a hip replacement in fall of 2022.  Roles and Routines: retired teacher  Equipment Used at Home: bedside commode  Assistance upon Discharge: Patient will have assistance of spouse whom is now per report returned to driving and participating in his own ADLs/IADLs modified independently. Of note, patient's spouse is using a walker at this time and thus may potentially not be able to provide physical assistance to patient    Objective:     Communicated with nurse prior to session.  Patient found HOB elevated with central line, restraints, telemetry, pulse ox (continuous), peripheral IV, arterial line, NG tube (full ICU monitoring)  upon PT entry to " "room.    General Precautions: Standard, fall  Orthopedic Precautions:N/A   Braces: N/A  Respiratory Status: Room air    Exams:  Cognitive Exam:  Patient is oriented to Person, unable to choose location when given multiple choices; follows ~25% of commands; pt reports feeling "tired"; occasionally closes eyes requiring VCs to open  Postural examination/scapula alignment:    -       Rounded shoulders  -       Holds head turned toward L, tilted minimally L  -       minimal tracking to midline throughout most of tx then past midline for MD with turning head past midline; primarily forward and lateral gaze with head turning from L to midline  Gross Motor Coordination:  impaired 2/2 weakness  Sensation: spouse reports peripheral neuropathy B feet; difficult to assess 2/2 mentation  Skin Integrity/Edema:      -       Skin integrity:  laceration to face; colostomy R  RLE ROM: WFL passively  RLE Strength: hip fl~2+ to 3-, hip abd/add~2- to 2; knee fl~2-, knee ext~1+; ankle~3+  LLE ROM: WFL passively  LLE Strength: hip fl~2+, hip abd/add~2-, knee fl~2-, knee ext~0, ankle ~3+  Patient able to volitionally move LUE but with significant weakness;  RUE with impaired strength, unable to move R shld/elbow, hand~trace active movement    Functional Mobility:  Bed Mobility:     Rolling Right: total assistance   Scooting: total assistance/dependence, of 2 persons, to HOB via drawsheet      AM-PAC 6 CLICK MOBILITY  Total Score:6     Treatment & Education:  Patient supine with slight offloading toward L side; educated pt and  on role of PT/POC; pt appearing lethargic; evaluated to pt tolerance; along with the above activities, pt required increased time/effort to perform eval and treatment; provided AA/PROM throughout BLEs; patient able to volitionally move BLEs, R>L proximally; B ankle movement equal; increased time/facilitation of hip/knee movement- pt unable to lift feet against gravity or hold feet elevated against gravity; " engaged pt in tracking activity; PT facilitating holding head away from HOB for OT to perform hair combing; collabed with nurse and MDs who presented to assess pt; vitals monitored throughout session with BP and O2 sats stable, HR low to high 90s with activity; requested spouse to bring a few of pt's familiar items that she would like to have to assist with reorientation, decreasing delerium and anxiety    Patient left HOB elevated and bed in modified chair position with all lines intact, call button in reach, bed alarm on, nurse notified, and  present.    GOALS:   Multidisciplinary Problems       Physical Therapy Goals          Problem: Physical Therapy    Goal Priority Disciplines Outcome Goal Variances Interventions   Physical Therapy Goal     PT, PT/OT Ongoing, Progressing     Description: Goals to be met by: 2023     Patient will increase functional independence with mobility by performin. Supine to sit with Maximal Assistance  2. Rolling with Moderate Assistance.  3. Sitting at edge of bed x10 minutes with Minimal Assistance and Moderate Assistance  4. Increased functional strength to at least 3 for transfers and bed mobility  Amend goals as appropriate                         History:     Past Medical History:   Diagnosis Date    Anxiety     Celiac disease 2018    Celiac disease     Depression     Diabetes mellitus     Family history of breast cancer in mother      at age 68    Hyperlipidemia     Hypertension     Meniere disease     Menopause     Peptic ulcer     Reflux esophagitis     Urinary tract infection     Vaginal infection     Vaginal Pap smear 2014    Pap/Hpv Negative        Past Surgical History:   Procedure Laterality Date    APPENDECTOMY      BREAST SURGERY      Tags    CARPAL TUNNEL RELEASE Bilateral 2017    ,     CLOSURE, COLOSTOMY Left 2023    Procedure: CLOSURE, COLOSTOMY;  Surgeon: Manuel Javier MD;  Location: [facility];  Service: General;   Laterality: Left;    COLONOSCOPY  04/2018    Normal  ( Juan A)     COLOSTOMY Right 1/16/2023    Procedure: CREATION, COLOSTOMY;  Surgeon: Manuel Javier MD;  Location: [facility] OR;  Service: General;  Laterality: Right;    DILATION AND CURETTAGE OF UTERUS  1986    DUPUYTREN CONTRACTURE RELEASE Bilateral 09/2017    HYSTERECTOMY  1987    BEAU w/ appy, no BSO     INJECTION OF NEUROLYTIC AGENT AROUND LUMBAR SYMPATHETIC NERVE N/A 1/6/2022    Procedure: BLOCK, LUMBAR SYMPATHETIC;  Surgeon: Souleymane Rizo Jr., MD;  Location: [facility]GT;  Service: Pain Management;  Laterality: N/A;  no pacemaker   pt is diabetic     INJECTION OF NEUROLYTIC AGENT AROUND LUMBAR SYMPATHETIC NERVE N/A 8/25/2022    Procedure: BLOCK, LUMBAR SYMPATHETIC;  Surgeon: Souleymane Rizo Jr., MD;  Location: [facility];  Service: Pain Management;  Laterality: N/A;  diabetic     LAPAROTOMY, EXPLORATORY N/A 1/14/2023    Procedure: LAPAROTOMY, EXPLORATORY;  Surgeon: Manuel Javier MD;  Location: [facility] OR;  Service: General;  Laterality: N/A;    LAPAROTOMY, EXPLORATORY N/A 1/16/2023    Procedure: LAPAROTOMY, EXPLORATORY;  Surgeon: Manuel Javier MD;  Location: [facility] OR;  Service: General;  Laterality: N/A;    SHOULDER SURGERY  2009 & 2010    right rotator cuff    TONSILLECTOMY      UPPER GASTROINTESTINAL ENDOSCOPY  04/2018       Time Tracking:     PT Received On: 01/18/23  PT Start Time: 1013     PT Stop Time: 1105  PT Total Time (min): 52 min   Cotx with OT for session due to complex nature of patient and for safety with mobility to decrease fall risk for patient and caregiver injury requiring two skilled therapists to provide different interventions.    Billable Minutes: Evaluation 14, Therapeutic Activity 15, and Therapeutic Exercise 12 Self Care 11      01/18/2023

## 2023-01-18 NOTE — PROGRESS NOTES
Progress Note  [facility]    Attending: Dr. Kan  Admit Date: 1/13/2023  Today's Date: 01/18/2023    SUBJECTIVE:     Patient seen and examined this morning. Pt AAOx1 to self. Responds to pain and at times to voice. RUE weakness noted as well as torticollis (pt with head turned to lefthand side).    Review of Systems   Unable to perform ROS: Mental status change     OBJECTIVE:     Vital Signs Trends/Hx Reviewed  Vitals:    01/18/23 1200 01/18/23 1237 01/18/23 1300 01/18/23 1400   BP: 134/65  Comment: Simultaneous filing. User may not have seen previous data.  (!) 149/68 139/70   BP Location: Right arm      Patient Position: Lying      Pulse: 86  Comment: Simultaneous filing. User may not have seen previous data.  87 84   Resp: 16  Comment: Simultaneous filing. User may not have seen previous data. 12 15 13   Temp: 99.3 °F (37.4 °C)      TempSrc: Axillary      SpO2: 98%  Comment: Simultaneous filing. User may not have seen previous data.  99% 99%   Weight:       Height:                Physical Exam  Constitutional:       General: She is not in acute distress.     Appearance: Normal appearance. She is normal weight.   HENT:      Head: Normocephalic and atraumatic.      Comments: Facial laceration on lip and ecchymosis of L eye     Mouth/Throat:      Mouth: Mucous membranes are moist.      Pharynx: Oropharynx is clear.   Eyes:      Extraocular Movements: Extraocular movements intact.      Conjunctiva/sclera: Conjunctivae normal.      Pupils: Pupils are equal, round, and reactive to light.   Cardiovascular:      Rate and Rhythm: Normal rate and regular rhythm.      Pulses: Normal pulses.      Heart sounds: Normal heart sounds.   Pulmonary:      Effort: Pulmonary effort is normal.      Breath sounds: Normal breath sounds.   Abdominal:      General: Abdomen is flat. There is no distension.      Palpations: Abdomen is soft.      Tenderness: There is generalized abdominal tenderness. There  is guarding.   Musculoskeletal:      Cervical back: Normal range of motion.      Right lower leg: No edema.      Left lower leg: No edema.   Skin:     General: Skin is warm.      Capillary Refill: Capillary refill takes 2 to 3 seconds.   Neurological:      General: No focal deficit present.      Mental Status: She is oriented to person, place, and time. She is confused.      Motor: Weakness present.       Laboratory:  Recent Labs   Lab 01/18/23  0310   WBC 19.60*   RBC 3.16*   HGB 9.0*   HCT 24.7*   *   MCV 78*   MCH 28.5   MCHC 36.4*     Recent Labs   Lab 01/18/23  0310   *   K 3.8      CO2 23   BUN 33*   CREATININE 2.8*   MG 2.2     No results for input(s): PH, PCO2, PO2, HCO3, POCSATURATED, BE in the last 24 hours.      Chest Imaging:   No new chest imaging.     ASSESSMENT & RECOMMENDATIONS     Lily Green is a 72 y.o. female with Celiac's Disease, GERD, DDD, HTN, MARIXA/MDD who presented after a fall after 3-5 days of fatigue, weakness, and lethargy. Found to have dilated colon, taken for ex lap and eventually total colectomy on 1/14/23. Currently in ICU for shock and ischemic bowel/acute abdomen, s/p repeat ex lap on 1/16/23.    Neuro/Psych  Acute Encephalopathy  Worsening mental status noted 1/16/23; some improvement 1/17 as pt more alert and responsive.  Concern for new onset RUE weakness noted by PT/OT --> CT head ordered: negative  Continue to monitor     Mild TBI  - Patient fell at home and hit her head, was intermittently confused at first, although may have been due to metabolic encephalopathy  - Monitor  - Initial CTH was unremarkable     Degenerative Disc Disease  - Hold Lyrica     CV  Shock   - suspected 2/2 septic shock from ischemic bowel  - [place] II score 35% on 1/16/23  - Was initially on 2 pressors, weaned off 1/15 am --> Levophed restarted on 1/16 --> now not requiring vasopressors  - Continue metronidazole and cefepime  - Echo results - EF 35%, Grade I LV diastolic  dysfunction, mild AR, mild MR, mild TR, no pulm htn.  - CTAP: Active extravasation of arterial contrast in LUQ in keeping with rapid active hemorrhage. Large volume hemoperitoneum throughout abdomen and pelvis.  - Hemorrhagic shock now resolved     Pulm  Acute Hypoxemic Respiratory Failure  - Resolved, SpO2 % on RA    Recent Labs     01/16/23  1615   PH 7.319*   PCO2 30.7*   PO2 74*   HCO3 15.8*   POCSATURATED 94*   BE -10       FEN/GI  F: None  E: Na 134 / K 3.8 / Cl 102 / bicarb 23  N: clear liquids, gluten free, increased tube feeds to 20 mL/hr     Acute Pancreatitis  Concerns for Acute Abdomen from Colonic Dilatation/Obstruction  Toxic megacolon  - S/P total colectomy with end ileostomy on 1/14/22  - C diff negative  - Elevated lactate at 1,278; concern for ischemic bowel  - General surgery consulted; repeat ex lap done on 1/16 for large bowel ischemia     Transaminitis  - Suspected 2/2 shock + congestion + acute illness  - Shock liver  - AST 1,443  - ALT 1,339  - Monitor, maintain good hemodynamics     RENAL  I/O last 3 completed shifts:  In: 1206.2 [Other:250; NG/GT:140; IV Piggyback:816.2]  Out: 2993 [Other:2993]  I/O this shift:  In: 100 [NG/GT:100]  Out: -      Intake/Output Summary (Last 24 hours) at 1/18/2023 1422  Last data filed at 1/18/2023 1200  Gross per 24 hour   Intake 1206.24 ml   Output 1500 ml   Net -293.76 ml      ATN/HINA  Lactic Acidosis  Metabolic Acidosis  - Cr baseline 0.4 - 0.6  s/p bradford placement, no evidence of obstruction  - Suspect ATN from shock  - Trialysis placed and s/p HD on 1/14/23  - CRRT running 1/17 AM  - Continue to monitor, nephrology following; switched to HD on 1/17  - Avoid nephrotoxins, strict I/O  - BUN/Cr: 33/2.8 (50/2.9) (59/3.5) (39/2.4)  - Bradford d/c  - Follow up UOP  - HD as needed as per nephro     Heme  Anemia  - H/H 9/24 (10/26)  - Previously Hb 6.2 from 10 overnight on 1/16  - Received 2 units PRBC  - Massive transfusion protcol initiated; transfused  another 2 units PRBC   - 2 units FFP transfused  - Plan for DVT US  - Fibrinogen 396 (upper normal)  - Continue to monitor     Endo  #T2DM  Last fasting B  - Glucose goal 140-180  - SSI + Accuchecks for now  - TSH wnl     ID  Septic shock  Gram negative bacteremia  - Possibly 2/2 toxic megacolon although etiology remains a little unclear (ischemic, IBD, colon CA, CMV) --> large bowel ischemia  - C diff negative  - Oral vanc d/c   - Continue metronidazole and cefepime  - Follow up BCx        DVT PPx: Heparin started   Diet: Clear liquids, gluten free, tube feeds at 20 mL/hr  GI PPX: N/A  Deconditioning: PT/OT when stable  Code Status: Full       Dispo  Pt to remain in ICU for current management.     Thank you for allowing us to participate in the care of this patient. We will continue to follow. Please call with questions.     Pt seen and discussed with Pulm/Crit Care attending physician Dr. Kan.      Whitney Barksdale MD, MPH  [facility] PGY-1  [facility]   2023    Pt seen and examined with Pulmonary/Critical Care team. Critical Care time was spent validating the history and physical exam, reviewing the lab and imaging results, and discussing the care of the patient with the bedside nurse. The following additional comments are made:    Hemodynamics good.  H/H stable. No fever. Tolerating feedings.  Not on sedation. No urine output.  Slowly becoming more animated.  Clearly has ICU-acquired weakness.  Hypoactive delirium. Rehab will be long and slow.    Critical Care time 35 minutes    Heriberto Kan MD  Phone [phone]

## 2023-01-18 NOTE — PLAN OF CARE
The sw called the pt's LOCET in to [facility] and faxed the PASRR to [facility].        01/18/23 0837   Post-Acute Status   Post-Acute Authorization Placement   Post-Acute Placement Status Pending state direction/certification   Discharge Plan   Discharge Plan A Rehab   Discharge Plan B Skilled Nursing Facility

## 2023-01-18 NOTE — PROGRESS NOTES
"[facility] Progress Note    Subjective:      Ms. Green is awake but drowsy this morning. Still not really conversant but nods no when asked if she is in pain. Tolerated HD well yesterday. Still no UOP.      Objective:   Last 24 Hour Vital Signs:  BP  Min: 110/57  Max: 144/64  Temp  Av.4 °F (36.9 °C)  Min: 97.7 °F (36.5 °C)  Max: 99.7 °F (37.6 °C)  Pulse  Av.7  Min: 70  Max: 102  Resp  Av.3  Min: 8  Max: 32  SpO2  Av.5 %  Min: 97 %  Max: 100 %  Height  Av' 5" (165.1 cm)  Min: 5' 5" (165.1 cm)  Max: 5' 5" (165.1 cm)  Weight  Av kg (132 lb 4.4 oz)  Min: 60 kg (132 lb 4.4 oz)  Max: 60 kg (132 lb 4.4 oz)  I/O last 3 completed shifts:  In: 1206.2 [Other:250; NG/GT:140; IV Piggyback:816.2]  Out: 2993 [Other:2993]    Physical Examination:  Gen: awake, not conversant   HEENT: normocephalic, EOMI, MMM  CV: RRR, no murmurs   Lungs: CTAB, symmetric chest rise  GI: Soft, moans in pain when abdomen touched   Extrem: no edema, clubbing, or cyanosis of LE; no presacral edema; mild edema to hands  Skin: dry, warm, intact.       Laboratory:  Recent Labs   Lab 23  0328 23  0406 23  1534 23  2018 23  0006 23  0323 23  0816 23  0310   WBC 26.10*   < > 17.13*  --   --  18.05*  --  19.60*   HGB 6.3*   < > 10.3* 10.0*   < > 9.8*  9.8* 10.1* 9.0*   HCT 17.3*   < > 29.5*  --   --  26.6*  --  24.7*      < > 154  --   --  116*  --  111*   *   < > 124* 128*  --  132*  132*  --  134*   K 5.6*   < > 6.1* 5.1  --  4.8  4.8  --  3.8   CL 95   < > 94* 98  --  101  101  --  102   CREATININE 4.1*   < > 4.1* 3.5*  --  2.9*  2.9*  --  2.8*   BUN 62*   < > 68* 59*  --  50*  50*  --  33*   CO2 17*   < > 16* 18*  --  20*  20*  --  23   ALT 97*  --   --   --   --  1,434*  --  1,339*   *  --   --   --   --  2,049*  --  1,443*    < > = values in this interval not displayed.       Radiology:  Imaging has been reviewed personally.       Assessment and " Plan:     Lily Green is a 72 y.o. woman with a history of celiac disease, DM2 well controlled, HTN, anxiety presented after having a fall. Noted to have severe lactic acidosis with imaging concerning for ischemic colitis. Became hypotensive and started on levophed and vasopressin for shock. On arrival to [place] taken to OR for colectomy on 1/14. UOP severely diminished and essentially became anuric with only 50ml UOP. [facility] consulted for severe metabolic acidosis with pH 7.14 and HINA in setting of septic shock and ischemic colitis. HD initiated 1/14 and had one session with improvement in acidosis. Developed hemorrhagic shock with bleeding into the abdomen 1/16 and taken back to OR for exlap and ileostomy creation. CRRT initiated 1/16, now off pressors and back on iHD.      Stage III Anuric HINA  - baseline creatinine 0.8  - presenting in shock on pressors with ischemic colitis.  s/p colectomy 1/14 but still with refractory acidosis and minimal UOP after. Another insult on 1/16 after hemorrhagic shock  - HD initiated 1/14; CRRT initiated 1/16 and discontinued 1/17; iHD resumed 1/17  - remains anuric  - no indication for HD today, will monitor day by day   - renally dose all medications      Anion Gap Metabolic Acidosis  - initial bicarb 12; lactate 7; pH 7.14; gap 21  - s/p colectomy but acidosis persistent. HD initiated 1/14. lactate > 12 again on 1/16 and taken back to OR. Improving after CRRT.   - bicarb 23 after HD yesterday     Hyponatremia  - due to volume received when in shock; improved after HD     Hyperphosphatemia  - phos improving after HD. No role for binders at this point as she is still not taking in PO     Hypocalcemia  - Ca 7.6 but corrects to 8.9 for low albumin       Thank you for allowing us to participate in the care of this patient. Please call with any questions.       Elizabeth Mobley, DO  [facility]

## 2023-01-18 NOTE — PLAN OF CARE
Care Plan    POC reviewed with Lily Green and family. Questions and concerns addressed. No acute events today. Physical therapy was unable to engage the patient in therapy not progressing toward goals. They will continue to work with the patient. Will continue to monitor. See below and flowsheets for full assessment and VS info.       Neuro:  Becky Coma Scale  Best Eye Response: 4-->(E4) spontaneous  Best Motor Response: 6-->(M6) obeys commands  Best Verbal Response: 4-->(V4) confused  [place] Coma Scale Score: 14  Assessment Qualifiers: no eye obstruction present, patient not sedated/intubated  Pupil PERRLA: yes  24 hr Temp:  [97.7 °F (36.5 °C)-98.8 °F (37.1 °C)]      CV:  Rhythm: normal sinus rhythm  DVT prophylaxis: VTE Required Core Measure: (SCDs) Sequential compression device initiated/maintained    Resp:     Vent Mode: SPONT-PS  Set Rate: 26 BPM  Oxygen Concentration (%): 21  Vt Set: 350 mL  PEEP/CPAP: 5.3 cmH20  Pressure Support: 8 cmH20    GI/:  GI prophylaxis: yes  Diet/Nutrition Received: tube feeding  Last Bowel Movement: 01/15/23  Voiding Characteristics: due to void   Intake/Output Summary (Last 24 hours) at 1/17/2023 2043  Last data filed at 1/17/2023 2000  Gross per 24 hour   Intake 735.79 ml   Output 2886 ml   Net -2150.21 ml       Labs/Accuchecks:  Recent Labs   Lab 01/17/23  0323 01/17/23  0816   WBC 18.05*  --    RBC 3.50*  --    HGB 9.8*  9.8* 10.1*   HCT 26.6*  --    *  --       Recent Labs   Lab 01/17/23  0323   *  132*   K 4.8  4.8   CO2 20*  20*     101   BUN 50*  50*   CREATININE 2.9*  2.9*   ALKPHOS 95   ALT 1,434*   AST 2,049*   BILITOT 1.9*      Recent Labs   Lab 01/17/23  0816 01/17/23  0822   INR  --  1.3*   APTT 33.2*  --       Recent Labs   Lab 01/13/23  1418 01/14/23  0844   CPK  --  111   TROPONINI <0.012  --        Electrolytes: Electrolytes replaced  Accuchecks: ACHS    Gtts/LDAs:      Lines/Drains/Airways       Central Venous  Catheter Line  Duration             Percutaneous Central Line Insertion/Assessment - Triple Lumen  01/13/23 2030 left internal jugular 4 days    Trialysis (Dialysis) Catheter 01/14/23 1420 right internal jugular 3 days              Drain  Duration                  Ileostomy 01/16/23 1230 RLQ 1 day         NG/OG Tube 01/16/23 1320 Angelina sump 18 Fr. Right nostril 1 day              Arterial Line  Duration             Arterial Line 01/14/23 0442 Left Radial 3 days                    Skin/Wounds  Bathing/Skin Care: other (see comments) (hands and face washed) (01/17/23 0715)  Wounds: Yes  Wound care consulted: No    Consults  Consults (From admission, onward)          Status Ordering Provider     Inpatient consult to Registered Dietitian/Nutritionist  Once        Provider:  (Not yet assigned)    Completed GREGOR PAYAN     Inpatient consult to Registered Dietitian/Nutritionist  Once        Provider:  (Not yet assigned)    Completed ELMER RANGEL     Inpatient consult to Nephrology-[facility]  Once        Provider:  (Not yet assigned)    Completed ELMER RANGEL     Inpatient consult to General Surgery  Once        Provider:  Kvng Javier MD    Completed KVNG JAVIER     Inpatient consult to Pulmonology  Once        Provider:  (Not yet assigned)    Completed MAYRA PÉREZ

## 2023-01-18 NOTE — PROGRESS NOTES
[facility]  History & Physical  Family Medicine       Admit Date: 2023   LOS: 5 days     Chief Complaint/Reason for Admission: Shock    HPI     Patient is a 73 y/o female with PMHx of diabetes type 2 (treated with metformin), HTN, and celiac disease (following a strict diet) who presented to OSH after falling and injuring her face while getting out of bed. The patient reported experiencing nausea, vomiting, diarrhea, abdominal pain, and weakness starting the day before. She has had a weight loss of 60 pounds over an extended period of time, which may have been partially caused by her diet. The patient's review of systems was otherwise normal. Of note patient is currently taking Lyrica, which has recently been increased. On evaluation at bedside, patient feels good just with mild abdominal pain. No other symptoms. Denies LOC with falls, does not remember if she hit her head.     ED Course: Presented hypotensive and tachycardic. Labs showed elevated WBC of 26.16, Glucose 269, , LA 7, and Beta-hydroxybutyrate 0.6. She was acidotic with ph of 7.282 and AG 21. Rest of the labs unremarkable. CT ab/pel revealed distention of the colon with a large amount of stool and fluid, and a segment of the colon that appeared narrowed with possible thickening of the wall, strongly suspected to be a colonic mass. S/p enema and disimpaction. The patient was given fluids (3 L) and antibiotics and transferred for further evaluation and possible surgery       Interval History: Patient required repeat exploratory lap to stop a bleed. Patient has been stable overnight but shows worsened cognition.         PMHx  Past Medical History:   Diagnosis Date    Anxiety     Celiac disease 2018    Celiac disease     Depression     Diabetes mellitus     Family history of breast cancer in mother      at age 68    Hyperlipidemia     Hypertension     Meniere disease     Menopause     Peptic ulcer     Reflux  esophagitis     Urinary tract infection     Vaginal infection     Vaginal Pap smear 04/07/2014    Pap/Hpv Negative         PSHx  Past Surgical History:   Procedure Laterality Date    APPENDECTOMY      BREAST SURGERY      Tags    CARPAL TUNNEL RELEASE Bilateral 09/2017    ,     CLOSURE, COLOSTOMY Left 1/16/2023    Procedure: CLOSURE, COLOSTOMY;  Surgeon: Manuel Javier MD;  Location: [facility] OR;  Service: General;  Laterality: Left;    COLONOSCOPY  04/2018    Normal  ( Juan A)     COLOSTOMY Right 1/16/2023    Procedure: CREATION, COLOSTOMY;  Surgeon: Manuel Javier MD;  Location: [facility] OR;  Service: General;  Laterality: Right;    DILATION AND CURETTAGE OF UTERUS  1986    DUPUYTREN CONTRACTURE RELEASE Bilateral 09/2017    HYSTERECTOMY  1987    BEAU w/ appy, no BSO     INJECTION OF NEUROLYTIC AGENT AROUND LUMBAR SYMPATHETIC NERVE N/A 1/6/2022    Procedure: BLOCK, LUMBAR SYMPATHETIC;  Surgeon: Souleymane Rizo Jr., MD;  Location: [facility] PAIN MGT;  Service: Pain Management;  Laterality: N/A;  no pacemaker   pt is diabetic     INJECTION OF NEUROLYTIC AGENT AROUND LUMBAR SYMPATHETIC NERVE N/A 8/25/2022    Procedure: BLOCK, LUMBAR SYMPATHETIC;  Surgeon: Souleymane Rizo Jr., MD;  Location: [facility] PAIN MGT;  Service: Pain Management;  Laterality: N/A;  diabetic     LAPAROTOMY, EXPLORATORY N/A 1/14/2023    Procedure: LAPAROTOMY, EXPLORATORY;  Surgeon: Manuel Javier MD;  Location: [facility] OR;  Service: General;  Laterality: N/A;    LAPAROTOMY, EXPLORATORY N/A 1/16/2023    Procedure: LAPAROTOMY, EXPLORATORY;  Surgeon: Manuel Javier MD;  Location: [facility] OR;  Service: General;  Laterality: N/A;    SHOULDER SURGERY  2009 & 2010    right rotator cuff    TONSILLECTOMY      UPPER GASTROINTESTINAL ENDOSCOPY  04/2018       Family History  Family History   Problem Relation Age of Onset    Vision loss Father     Arthritis Father     Breast cancer Mother     Cancer Mother     Vision loss Mother     Hyperlipidemia Sister      Breast cancer Paternal Aunt     Cancer Paternal Aunt     Diabetes Paternal Grandfather     Vision loss Sister     Kidney disease Neg Hx        Social  Social History     Socioeconomic History    Marital status:    Tobacco Use    Smoking status: Never    Smokeless tobacco: Never   Substance and Sexual Activity    Alcohol use: No    Drug use: Never    Sexual activity: Not Currently     Partners: Male     Birth control/protection: See Surgical Hx     Comment: :      Social Determinants of Health     Financial Resource Strain: Low Risk     Difficulty of Paying Living Expenses: Not hard at all   Food Insecurity: No Food Insecurity    Worried About Running Out of Food in the Last Year: Never true    Ran Out of Food in the Last Year: Never true   Transportation Needs: No Transportation Needs    Lack of Transportation (Medical): No    Lack of Transportation (Non-Medical): No   Physical Activity: Sufficiently Active    Days of Exercise per Week: 7 days    Minutes of Exercise per Session: 50 min   Stress: Stress Concern Present    Feeling of Stress : Very much   Social Connections: Unknown    Frequency of Communication with Friends and Family: More than three times a week    Frequency of Social Gatherings with Friends and Family: More than three times a week    Active Member of Clubs or Organizations: Yes    Attends Club or Organization Meetings: More than 4 times per year    Marital Status:    Housing Stability: Low Risk     Unable to Pay for Housing in the Last Year: No    Number of Places Lived in the Last Year: 1    Unstable Housing in the Last Year: No         Allergies  Review of patient's allergies indicates:   Allergen Reactions    Crestor [rosuvastatin] Swelling    Gluten protein        ROS- As per HPI, otherwise negative  Review of Systems   Unable to perform ROS: Mental status change     Objective  Vitals(Most Recent)      Temp: 99.7 °F (37.6 °C) (01/18/23 0800)  Pulse: 87 (01/18/23  0800)  Resp: 14 (01/18/23 0800)  BP: 136/65 (01/18/23 0800)  SpO2: 100 % (01/18/23 0800)           Vitals Range  Temp:  [97.7 °F (36.5 °C)-99.7 °F (37.6 °C)]   Pulse:  []   Resp:  [8-32]   BP: (110-144)/(56-75)   SpO2:  [97 %-100 %]   Arterial Line BP: (136-185)/(49-62)     I/O  I/O last 3 completed shifts:  In: 1206.2 [Other:250; NG/GT:140; IV Piggyback:816.2]  Out: 2993 [Other:2993]    Intake/Output Summary (Last 24 hours) at 1/18/2023 0848  Last data filed at 1/18/2023 0600  Gross per 24 hour   Intake 1106.24 ml   Output 1676 ml   Net -569.76 ml           Physical Exam  Physical Exam  Constitutional:       General: She is not in acute distress.     Appearance: Normal appearance. She is normal weight.   HENT:      Head: Normocephalic and atraumatic.      Comments: Facial laceration on lip and ecchymosis of L eye     Mouth/Throat:      Mouth: Mucous membranes are moist.      Pharynx: Oropharynx is clear.   Eyes:      Extraocular Movements: Extraocular movements intact.      Conjunctiva/sclera: Conjunctivae normal.      Pupils: Pupils are equal, round, and reactive to light.   Cardiovascular:      Rate and Rhythm: Normal rate and regular rhythm.      Pulses: Normal pulses.      Heart sounds: Normal heart sounds.   Pulmonary:      Effort: Pulmonary effort is normal.      Breath sounds: Normal breath sounds.   Abdominal:      General: Abdomen is flat. Bowel sounds are absent. There is no distension.      Palpations: Abdomen is soft.      Tenderness: There is generalized abdominal tenderness. There is guarding.   Musculoskeletal:      Cervical back: Normal range of motion.      Right lower leg: No edema.      Left lower leg: No edema.   Skin:     General: Skin is warm.      Capillary Refill: Capillary refill takes 2 to 3 seconds.   Neurological:      General: No focal deficit present.      Mental Status: She is oriented to person, place, and time. She is confused.      Motor: Weakness present.      Comments:  Patient stare and tracks with gaze, withdrawals to pain  Did not follow commands or answer questions on exam  Right UE weakness when seen by PT/OT       Labs  Recent Labs   Lab 01/18/23  0310   WBC 19.60*   RBC 3.16*   HGB 9.0*   HCT 24.7*   *   MCV 78*   MCH 28.5   MCHC 36.4*        Recent Labs   Lab 01/18/23  0310   CALCIUM 7.6*   PROT 4.6*   *   K 3.8   CO2 23      BUN 33*   CREATININE 2.8*   ALKPHOS 112   ALT 1,339*   AST 1,443*   BILITOT 1.1*          Lab Results   Component Value Date    INR 1.3 (H) 01/17/2023    INR 1.6 (H) 01/16/2023    INR 1.6 (H) 01/16/2023    APTT 33.2 (H) 01/17/2023       No results for input(s): TROPONINI, CPKMB, CPK in the last 72 hours.      A1c:   Lab Results   Component Value Date    HGBA1C 5.8 (H) 11/01/2022     , Last Gluc:   Recent Labs   Lab 01/17/23  1109 01/17/23  1538 01/17/23  1900 01/17/23  1920 01/18/23  0419 01/18/23  0808   POCTGLUCOSE 74 66* 66* 126* 102 83         TSH:   Lab Results   Component Value Date    TSH 1.821 01/13/2023       UA  Urinalysis        Micro  Microbiology Results (last 7 days)       Procedure Component Value Units Date/Time    Blood culture [[id number]] Collected: 01/17/23 1927    Order Status: Completed Specimen: Blood Updated: 01/18/23 0745     Blood Culture, Routine No Growth to date    Blood culture [[id number]] Collected: 01/17/23 1927    Order Status: Completed Specimen: Blood Updated: 01/18/23 0745     Blood Culture, Routine No Growth to date    Blood culture [[id number]]     Order Status: Canceled Specimen: Blood     Clostridium difficile EIA [[id number]] Collected: 01/14/23 0241    Order Status: Completed Specimen: Stool Updated: 01/16/23 0954     C. diff Antigen Negative     C difficile Toxins A+B, EIA Negative     Comment: Testing not recommended for children <24 months old.                ABG  Recent Labs   Lab 01/14/23  0842 01/14/23  1207 01/16/23  1615   PH 7.142* 7.304* 7.319*   PCO2 49.2* 26.1* 30.7*   PO2 160* 95  74*   HCO3 16.8* 12.9* 15.8*   POCSATURATED 99 97 94*   BE -12 -13 -10         Imaging           Assessment/Plan:  Lily Green is a 72 y.o. female patient w/ PMHx of diabetes type 2 (treated with metformin), HTN, and celiac disease (following a strict diet)     Here with   1. Shock  2. ischemic bowel/ acute abdomen     Neuro/Psych  #AMS, Acute illness delirium  Appears lethargic, confused, but does answer questions appropriately  H/o of frequent falls  Home Lyrica recently increased.  CTH unremarkable  Minimally responsive on interview  Hypovolemic shock and surgery on 1/17/23  - Continue to monitor  -  CT head for weakness in the right arm    CV  #Shock  Poss septic, LA 7 > 6.8  Responsive to fluids  - Pressors held  - Will continue abx  - IV pressor support prn   - F/u echo  - Trend lactate  - DC       # Anemia  H/H dropped from 10 to 6.2 overnight  Down from 17 on admit  Initial factors dilution, and blood loss from colectomy 1/4/23  New drop of unknown cause  Bleed stopped in OR  H/H 10.1  PTINR 13.6/1.3  Plan    Follow up coagulopathy work up    Monitor for signs of bleeding    Pulm  On RA no issues  O2 sat 100 on 4L NC     FEN/GI  #Ischemic bowel, secondary to Toxic [place]on colon  #Acute Abdomen  Although abdomen non concerning, imaging concerning  Also elevated LA concerning for ischemic bowel  Amylase greatly elevated pointing to ischemia  CT ab/pel revealing distention of the colon with a large amount of stool and fluid, and a segment of the colon that appeared narrowed with possible thickening of the wall, strongly suspected to be a colonic mass  Surgery s/p colon resection revealed no evidence of mass  No evidence of prior CV disease   Etiology unknown  Bladder pressure wnl  ABG ph 7.319 PCO2 30.7, O2 74 Bicarb 15.8  Gen surg consulted  - S/p repeat ex lap for bleed  - Diet: Tube feeds, Diabetisource titrating to 60ml/hr  - Keep Mg 2, K 4  - Zofran prn for n/v   - F/u Gen surg recs  - maintain  euvolemia  - Dietary concerns tube feeds    Shock Liver    Monitor AST/ALT  Repeat US to assess for cholecystitis    #hypocalcemia  Stable, monitor        RENAL  #HINA  Likely 2/2 to shock  BUN/Cr: 58/1.7 > 60/1.9  UOP: minimal despite aggressive IVF  - HD on 1/14/23  -  in 24 hr  -Worsened BUN 62/4.1  -Phos 8.5    Plan  Strict I&Os  Avoid renal toxic meds   Continue to monitor renal status and urine output   Repeat HD 1/16/23    dc bradford  Bladder scans    Heme  Status post bowel resection  H/H down from 17.2/50.7 on admit  10/27.9  WBC increased 13 >18 monitor  PT/INR 16.4/1.6  DVT prophylaxis: Restart pending Surgery rec      # Anemia  H/H dropped from 10 to 6.2 overnight  Down from 17 on admit  Initial factors dilution, and blood loss from colectomy 1/4/23  New drop of unknown cause  No free fluid visualized on Bedside abd US  Plan    Repeat H/h after transfusion    Monitor signs of bleeding, bruising, bloody ostomy output    Follow up coagulopathy work up            Endo  #possible starvation ketoacidosis  #T2DM   Elevated Bgs, AG, and slight B-hydrox  - Supportive management  - Maintain glucose 140-180  - IVF prn  - SSI and long acting  - Holding AM levemir in setting of repeat low BG  - F/u BMP      ID  Gram negative Bacteremia  - Gram negative rods reported in Blood culture  - Continue Cefepime, flagyl  - Blood culture ordered           DVT PPx: SCD, transition to Lovenox when appropriate  Diet: NPO  GI PPX: None  Deconditioning: PT/OT  Code Status: Full        Dispo  - Continue ICU Care      1/18/2023 Bal Burr M.D., -II  [facility]  [facility]

## 2023-01-18 NOTE — PROGRESS NOTES
Progress Note  General Surgery    Admit Date: 1/13/2023      Post-operative Day: 2 Days Post-Op    Hospital Day: 6    SUBJECTIVE:     No acute events overnight. Patient remains awake but limited verbal interaction, remains off pressors, Hgb stable  OBJECTIVE:     Vital Signs (Most Recent)  Temp: 99.7 °F (37.6 °C) (01/18/23 0800)  Pulse: 94 (01/18/23 1000)  Resp: 17 (01/18/23 1000)  BP: 135/63 (01/18/23 1000)  SpO2: 97 % (01/18/23 1000)    Vital Signs Range (Last 24H):  Temp:  [97.7 °F (36.5 °C)-99.7 °F (37.6 °C)]   Pulse:  []   Resp:  [8-24]   BP: (110-144)/(56-68)   SpO2:  [97 %-100 %]   Arterial Line BP: (136-185)/(49-62)     I & O (Last 24H):  Intake/Output Summary (Last 24 hours) at 1/18/2023 1054  Last data filed at 1/18/2023 0600  Gross per 24 hour   Intake 1106.24 ml   Output 1500 ml   Net -393.76 ml       Physical Exam:  Gen: NAD   HEENT: NCAT  Pulm: unlabored, symmetrical   Abd: Soft, nttp. No rebound, guarding.     Wound/Incision:  clean, dry, intact    Laboratory:  Labs within the past 24 hours have been reviewed.    ASSESSMENT/PLAN:     Assessment/Plan:  Ok to DC JEFFERSON Javier M.D., F.A.C.S.  [id number] and General Surgery  [facility]

## 2023-01-18 NOTE — PLAN OF CARE
OT evaluation completed, co-evaluation with PT in ICU 2/2 complexity.  See detailed note to follow. Patient with presentation of lethargy, preferred left cervical rotated positioning and preferred left to midline visual gaze (although able to cross midline to right with cueing), and overall significant weakness (LUE strength grossly at 2-/5 - 2+/5) specifically in RUE (slight inconsistent R hand movement but no volitional or on command movement of R shoulder/elbow during eval/tx). Patient's mentation limited to oriented to self, following ~25% of commands, using gestures inconsistently, and very minimal verbalizations. Limited assessment due to fatigue and variance of participation levels. At this time requires dependence of 2 for repositioning and max-total for bed level ADL. Anticipate when medically stable, post acute therapy will be needed. More specified discharge disposition and DME recommendations to be provided pending progression and ongoing assessment of this patient.      Problem: Occupational Therapy  Goal: Occupational Therapy Goal  Description: Goals to be met by: 2/15/2023     Patient will increase functional independence with ADLs by performing:    Feeding with min assist and incorporation of upright seated posture with incorporation of conservation techniques  UE Dressing with Moderate Assistance with easy on overhead clothing  Grooming while seated with Minimal Assistance.  Rolling bilaterally with moderate assist to increased ability to self-reposition to decrease pressure injury and to decrease burden of care during pericare  Sitting eob with min assist x8 minutes without adverse vital signs  Bed < > BSC transfer with moderate assist via least restrictive method  Increase BUE MMT grossly to 3+/5 to integrate dominant UE into daily living tasks    1/18/2023 1128 by Demi Lima OT  Outcome: Ongoing, Progressing

## 2023-01-18 NOTE — PLAN OF CARE
Problem: Physical Therapy  Goal: Physical Therapy Goal  Description: Goals to be met by: 2023     Patient will increase functional independence with mobility by performin. Supine to sit with Maximal Assistance  2. Rolling with Moderate Assistance.  3. Sitting at edge of bed x10 minutes with Minimal Assistance and Moderate Assistance  4. Increased functional strength to at least 3 for transfers and bed mobility  Amend goals as appropriate    Outcome: Ongoing, Progressing   Patient evaluated to patient tolerance with OT; pt oriented to name, minimal tracking to midline throughout most of tx then past midline for MD with turning head past midline; primarily forward gaze with head turning from L to midline; pt able to volitionally move RLE >LLE and inconsistent upon command; able to fully df/pf; RUE with minimal and inconsistent shld/elbow movement, but no movement volitionally or upon command of R hand; volitional movement and inconsistent upon command for LUE; command following ~25% overall; dependent with bed mobility to HOB via drawsheet; pt very tired, and only communicating with head nods and very occasional one word answer; BLE AA/PROM provided; will progress as tolerated; d/c recs ongoing pending progress.

## 2023-01-19 PROBLEM — R79.89 ELEVATED LACTIC ACID LEVEL: Status: ACTIVE | Noted: 2023-01-19

## 2023-01-19 LAB
ALBUMIN SERPL BCP-MCNC: 2.1 G/DL (ref 3.5–5.2)
ALP SERPL-CCNC: 118 U/L (ref 55–135)
ALT SERPL W/O P-5'-P-CCNC: 1259 U/L (ref 10–44)
ANION GAP SERPL CALC-SCNC: 9 MMOL/L (ref 8–16)
ANISOCYTOSIS BLD QL SMEAR: SLIGHT
AST SERPL-CCNC: 1095 U/L (ref 10–40)
BASOPHILS NFR BLD: 0 % (ref 0–1.9)
BILIRUB SERPL-MCNC: 0.8 MG/DL (ref 0.1–1)
BUN SERPL-MCNC: 58 MG/DL (ref 8–23)
BURR CELLS BLD QL SMEAR: ABNORMAL
CALCIUM SERPL-MCNC: 7.5 MG/DL (ref 8.7–10.5)
CHLORIDE SERPL-SCNC: 100 MMOL/L (ref 95–110)
CO2 SERPL-SCNC: 22 MMOL/L (ref 23–29)
CREAT SERPL-MCNC: 4.5 MG/DL (ref 0.5–1.4)
DACRYOCYTES BLD QL SMEAR: ABNORMAL
DIFFERENTIAL METHOD: ABNORMAL
EOSINOPHIL NFR BLD: 1 % (ref 0–8)
ERYTHROCYTE [DISTWIDTH] IN BLOOD BY AUTOMATED COUNT: 19.7 % (ref 11.5–14.5)
EST. GFR  (NO RACE VARIABLE): 10 ML/MIN/1.73 M^2
GIANT PLATELETS BLD QL SMEAR: PRESENT
GLUCOSE SERPL-MCNC: 195 MG/DL (ref 70–110)
HCT VFR BLD AUTO: 25 % (ref 37–48.5)
HGB BLD-MCNC: 8.6 G/DL (ref 12–16)
IMM GRANULOCYTES # BLD AUTO: ABNORMAL K/UL
IMM GRANULOCYTES NFR BLD AUTO: ABNORMAL %
LYMPHOCYTES NFR BLD: 9 % (ref 18–48)
MAGNESIUM SERPL-MCNC: 2.3 MG/DL (ref 1.6–2.6)
MCH RBC QN AUTO: 27.7 PG (ref 27–31)
MCHC RBC AUTO-ENTMCNC: 34.4 G/DL (ref 32–36)
MCV RBC AUTO: 80 FL (ref 82–98)
MONOCYTES NFR BLD: 10 % (ref 4–15)
MYELOCYTES NFR BLD MANUAL: 2 %
NEUTROPHILS NFR BLD: 77 % (ref 38–73)
NEUTS BAND NFR BLD MANUAL: 1 %
NRBC BLD-RTO: 0 /100 WBC
OVALOCYTES BLD QL SMEAR: ABNORMAL
PHOSPHATE SERPL-MCNC: 3.1 MG/DL (ref 2.7–4.5)
PLATELET # BLD AUTO: 102 K/UL (ref 150–450)
PLATELET BLD QL SMEAR: ABNORMAL
PMV BLD AUTO: 9.5 FL (ref 9.2–12.9)
POIKILOCYTOSIS BLD QL SMEAR: SLIGHT
POLYCHROMASIA BLD QL SMEAR: ABNORMAL
POTASSIUM SERPL-SCNC: 4.1 MMOL/L (ref 3.5–5.1)
PROT SERPL-MCNC: 4.7 G/DL (ref 6–8.4)
RBC # BLD AUTO: 3.11 M/UL (ref 4–5.4)
SODIUM SERPL-SCNC: 131 MMOL/L (ref 136–145)
WBC # BLD AUTO: 21.23 K/UL (ref 3.9–12.7)

## 2023-01-19 PROCEDURE — 25000003 PHARM REV CODE 250: Performed by: STUDENT IN AN ORGANIZED HEALTH CARE EDUCATION/TRAINING PROGRAM

## 2023-01-19 PROCEDURE — 25000003 PHARM REV CODE 250: Performed by: SURGERY

## 2023-01-19 PROCEDURE — 97535 SELF CARE MNGMENT TRAINING: CPT

## 2023-01-19 PROCEDURE — 85027 COMPLETE CBC AUTOMATED: CPT | Performed by: STUDENT IN AN ORGANIZED HEALTH CARE EDUCATION/TRAINING PROGRAM

## 2023-01-19 PROCEDURE — 86706 HEP B SURFACE ANTIBODY: CPT | Performed by: STUDENT IN AN ORGANIZED HEALTH CARE EDUCATION/TRAINING PROGRAM

## 2023-01-19 PROCEDURE — S0030 INJECTION, METRONIDAZOLE: HCPCS | Performed by: SURGERY

## 2023-01-19 PROCEDURE — 51798 US URINE CAPACITY MEASURE: CPT

## 2023-01-19 PROCEDURE — 25000003 PHARM REV CODE 250: Performed by: INTERNAL MEDICINE

## 2023-01-19 PROCEDURE — 80053 COMPREHEN METABOLIC PANEL: CPT | Performed by: STUDENT IN AN ORGANIZED HEALTH CARE EDUCATION/TRAINING PROGRAM

## 2023-01-19 PROCEDURE — 80100014 HC HEMODIALYSIS 1:1

## 2023-01-19 PROCEDURE — 97530 THERAPEUTIC ACTIVITIES: CPT

## 2023-01-19 PROCEDURE — 97112 NEUROMUSCULAR REEDUCATION: CPT

## 2023-01-19 PROCEDURE — 97110 THERAPEUTIC EXERCISES: CPT

## 2023-01-19 PROCEDURE — 85007 BL SMEAR W/DIFF WBC COUNT: CPT | Performed by: STUDENT IN AN ORGANIZED HEALTH CARE EDUCATION/TRAINING PROGRAM

## 2023-01-19 PROCEDURE — 20000000 HC ICU ROOM

## 2023-01-19 PROCEDURE — 92610 EVALUATE SWALLOWING FUNCTION: CPT

## 2023-01-19 PROCEDURE — 87340 HEPATITIS B SURFACE AG IA: CPT | Performed by: STUDENT IN AN ORGANIZED HEALTH CARE EDUCATION/TRAINING PROGRAM

## 2023-01-19 PROCEDURE — 90935 HEMODIALYSIS ONE EVALUATION: CPT

## 2023-01-19 PROCEDURE — 63600175 PHARM REV CODE 636 W HCPCS: Performed by: INTERNAL MEDICINE

## 2023-01-19 PROCEDURE — 94761 N-INVAS EAR/PLS OXIMETRY MLT: CPT

## 2023-01-19 PROCEDURE — 83735 ASSAY OF MAGNESIUM: CPT | Performed by: STUDENT IN AN ORGANIZED HEALTH CARE EDUCATION/TRAINING PROGRAM

## 2023-01-19 PROCEDURE — 63600175 PHARM REV CODE 636 W HCPCS: Performed by: STUDENT IN AN ORGANIZED HEALTH CARE EDUCATION/TRAINING PROGRAM

## 2023-01-19 PROCEDURE — 84100 ASSAY OF PHOSPHORUS: CPT | Performed by: STUDENT IN AN ORGANIZED HEALTH CARE EDUCATION/TRAINING PROGRAM

## 2023-01-19 RX ORDER — ACETAMINOPHEN 325 MG/1
650 TABLET ORAL EVERY 12 HOURS PRN
Status: DISCONTINUED | OUTPATIENT
Start: 2023-01-19 | End: 2023-01-23

## 2023-01-19 RX ADMIN — HYDROCODONE BITARTRATE AND ACETAMINOPHEN 1 TABLET: 5; 325 TABLET ORAL at 02:01

## 2023-01-19 RX ADMIN — HYDROMORPHONE HYDROCHLORIDE 0.5 MG: 1 INJECTION, SOLUTION INTRAMUSCULAR; INTRAVENOUS; SUBCUTANEOUS at 05:01

## 2023-01-19 RX ADMIN — HYDROMORPHONE HYDROCHLORIDE 0.5 MG: 1 INJECTION, SOLUTION INTRAMUSCULAR; INTRAVENOUS; SUBCUTANEOUS at 12:01

## 2023-01-19 RX ADMIN — HEPARIN SODIUM 5000 UNITS: 5000 INJECTION INTRAVENOUS; SUBCUTANEOUS at 11:01

## 2023-01-19 RX ADMIN — CEFEPIME 2 G: 2 INJECTION, POWDER, FOR SOLUTION INTRAVENOUS at 02:01

## 2023-01-19 RX ADMIN — METRONIDAZOLE 500 MG: 5 INJECTION, SOLUTION INTRAVENOUS at 08:01

## 2023-01-19 RX ADMIN — HEPARIN SODIUM 5000 UNITS: 5000 INJECTION INTRAVENOUS; SUBCUTANEOUS at 05:01

## 2023-01-19 RX ADMIN — HYDROCODONE BITARTRATE AND ACETAMINOPHEN 1 TABLET: 5; 325 TABLET ORAL at 08:01

## 2023-01-19 RX ADMIN — HEPARIN SODIUM 5000 UNITS: 5000 INJECTION INTRAVENOUS; SUBCUTANEOUS at 02:01

## 2023-01-19 RX ADMIN — METRONIDAZOLE 500 MG: 5 INJECTION, SOLUTION INTRAVENOUS at 12:01

## 2023-01-19 RX ADMIN — METRONIDAZOLE 500 MG: 5 INJECTION, SOLUTION INTRAVENOUS at 05:01

## 2023-01-19 RX ADMIN — INSULIN ASPART 2 UNITS: 100 INJECTION, SOLUTION INTRAVENOUS; SUBCUTANEOUS at 09:01

## 2023-01-19 RX ADMIN — ACETAMINOPHEN 650 MG: 325 TABLET ORAL at 06:01

## 2023-01-19 NOTE — PROGRESS NOTES
[facility] Progress Note    Subjective:      Ms. Green is still not conversing very much. Nods yes and no to questions today but that is really all. Has not made any urine in last 24 hours.      Objective:   Last 24 Hour Vital Signs:  BP  Min: 122/61  Max: 157/70  Temp  Av °F (37.2 °C)  Min: 98.6 °F (37 °C)  Max: 99.4 °F (37.4 °C)  Pulse  Av.8  Min: 79  Max: 91  Resp  Av.6  Min: 9  Max: 17  SpO2  Av.1 %  Min: 97 %  Max: 100 %  I/O last 3 completed shifts:  In: 1320.5 [NG/GT:950; IV Piggyback:370.5]  Out: -     Physical Examination:  Gen: awake, not conversant   HEENT: normocephalic, EOMI, MMM  CV: RRR, no murmurs   Lungs: CTAB, symmetric chest rise  GI: Soft, moans in pain when abdomen touched   Extrem: more edema today but minimal in LE, more in hands  Skin: dry, warm, intact.       Laboratory:  Recent Labs   Lab 23  0323 23  0816 23  0310 23  0348   WBC 18.05*  --  19.60* 21.23*   HGB 9.8*  9.8* 10.1* 9.0* 8.6*   HCT 26.6*  --  24.7* 25.0*   *  --  111* 102*   *  132*  --  134* 131*   K 4.8  4.8  --  3.8 4.1     101  --  102 100   CREATININE 2.9*  2.9*  --  2.8* 4.5*   BUN 50*  50*  --  33* 58*   CO2 20*  20*  --  23 22*   ALT 1,434*  --  1,339* 1,259*   AST 2,049*  --  1,443* 1,095*       Radiology:  Imaging has been reviewed personally.       Assessment and Plan:     Lily Green is a 72 y.o. woman with a history of celiac disease, DM2 well controlled, HTN, anxiety presented after having a fall. Noted to have severe lactic acidosis with imaging concerning for ischemic colitis. Became hypotensive and started on levophed and vasopressin for shock. On arrival to [place] taken to OR for colectomy on . UOP severely diminished and essentially became anuric with only 50ml UOP. [facility] consulted for severe metabolic acidosis with pH 7.14 and HINA in setting of septic shock and ischemic colitis. HD initiated  and had one  session with improvement in acidosis. Developed hemorrhagic shock with bleeding into the abdomen 1/16 and taken back to OR for exlap and ileostomy creation. CRRT initiated 1/16, now off pressors and back on iHD.      Stage III Anuric HINA  - baseline creatinine 0.8  - presenting in shock on pressors with ischemic colitis.  s/p colectomy 1/14 but still with refractory acidosis and minimal UOP after. Another insult on 1/16 after hemorrhagic shock  - ATN with 2 separate insults will likely take several weeks to months to recover   - HD initiated 1/14; CRRT initiated 1/16 and discontinued 1/17; iHD resumed 1/17  - remains anuric  - HD today for volume  - renally dose all medications      Anion Gap Metabolic Acidosis  - on arrival and again after hemorrhagic shock had lactic acidosis from bowel ischemia. Now resolved and continues to need dialysis    - bicarb 22 today, will improve after HD     Hyponatremia  - due to hypervolemia. Na down to 131 and expect to improve after HD     Hyperphosphatemia  - phos improving after HD. No need for binders at this time.      Hypocalcemia  - Ca corrects for hypoalbuminemia       Thank you for allowing us to participate in the care of this patient. Please call with any questions.       Elizabeth Mobley, DO  [facility]

## 2023-01-19 NOTE — PHYSICIAN QUERY
PT Name: Lily Green  MR #: [id number]     DOCUMENTATION CLARIFICATION     CDS: Brandy Capley, RN  Email: [email or web address]     This form is a permanent document in the medical record.     Query Date: 2023    By submitting this query, we are merely seeking further clarification of documentation.  Please utilize your independent clinical judgment when addressing the question(s) below.  The Medical Record contains the following   Indicators   Supporting Clinical Findings Location in Medical Record    SOB, EDEN, Wheezing, Productive Cough, Use of Accessory Muscles, etc.     X RR         ABGs         O2 sat Last 24 Hour Vital Signs:  BP  Min: 71/46  Max: 139/66  Temp  Av.1 °F (37.3 °C)  Min: 98.1 °F (36.7 °C)  Max: 100.1 °F (37.8 °C)  Pulse  Av.8  Min: 87  Max: 109  Resp  Av.7  Min: 16  Max: 36  SpO2  Av.7 %  Min: 82 %  Max: 100 %     23 08:42 23 12:07   Sample ARTERIAL ARTERIAL   POC PH 7.142 (LL) 7.304 (L)   POC PCO2 49.2 (H) 26.1 (LL)   POC PO2 160 (H) 95   POC HCO3 16.8 (L) 12.9 (L)   POC TCO2 18 (L) 14 (L)   POC SATURATED O2 99 97   Allens Test N/A N/A   POC BE -12 -13   FiO2 40 21   Vt 350    PEEP 5 5   DelSys PRB PRB   Site Danilo/UAC Danilo/UAC   Mode AC/PRVC AC/PRVC   Rate 26       Pulmonology consult  filed 1/15               ABG's     Hypoxia/Hypercapnia     X BiPAP/Intubation/Mechanical Ventilation Intubation  Date/Time: 2023 5:01 AM    At this point the patient was awoken from anesthesia and returned to the intensive care unit intubated and in a guarded condition.     Extubated to room air and tolerating well.    Anesthesia record       Op note       Critical care medicine care update    X Supplemental O2 Room air    Device (oxygen therapy): Ventilator  Oxygen concentration (%): 40  SpO2: 100   RT PCS flowsheet - 0819    RT PCS flowsheet  0826    Home O2, Oxygen Dependence     X Respiratory distress or failure Acute  respiratory failure - SBT when recovered from anaesthesia and metabolic acidosis improves    Respiratory:   Acute Hypoxemic Respiratory Failure  - Currently on 2LNC  - Appears renal function is worsening; trial of lasix  - try to maintain net negative fluid balance daily  - Wean SpO2, goal 88-95%   Pulmonology consult 1/14 filed 1/15 attestation    Pulmonology consult 1/14 filed 1/15    X Radiology findings FINDINGS:  Tip of endotracheal tube projects approximately 41 mm above level yo.  Tip and side port of enteric tube projects subdiaphragmatic in location.  Grossly unchanged position left internal jugular approach central venous catheter.  Monitoring leads are noted.     Grossly unchanged cardiomediastinal contours.  Lungs appear grossly clear.  No definite pneumothorax or large volume pleural effusion.     Anticipated postoperative pneumoperitoneum noted.     Remainder examination not substantially changed relative to 01/13/2023, 20:45 hours examination.   CXR 1/14   X Acute/Chronic Illness Lily Green is a 72 y.o. woman with a history of celiac disease, DM2 well controlled, HTN, anxiety presented to [facility] after having a fall. Noted to have severe lactic acidosis with imaging concerning for ischemic colitis. Became hypotensive and started on levophed and vasopressin for shock. On arrival to [place] taken to OR for colectomy.     POSTOPERATIVE DIAGNOSIS:  Peritonitis secondary to diffuse colonic ischemia  PROCEDURE:  Total colectomy with mobilization of the splenic flexure and end ileostomy creation   Nephrology consult 1/14          Op note 1/14    Treatment     X Other Respiratory:  Negative for cough and shortness of breath.      Resp: CTA B/L, no increased WOB, no crackles, rhonchi appreciated   H&P 1/13, filed 1/14    Pulmonology consult 1/14 filed 1/15        The noted clinical guidelines are only system guidelines and do not replace the providers clinical judgment.    Provider,  please specify the diagnosis or diagnoses associated with above clinical findings:    [x] No Respiratory Failure, Maintained on Vent for Routine Care or Airway Protection -    purposely intubated for airway protection (e.g.: angioedema, stroke, trauma); without meeting the criteria for respiratory failure.      [    ] Acute Respiratory Insufficiency -   Generally describes less severe respiratory symptoms and measurements (pO2, SpO2, pH, and pCO2) not meeting criteria for respiratory failure       [    ] Acute Respiratory Failure with Hypoxia -   ABG pO2 < 60 mmHg or O2 sat of <91% on room air and respiratory symptoms documented     [    ] Other Respiratory Diagnosis (please specify): _________________     [   ] Clinically Undetermined         Please document in your progress notes daily for the duration of treatment until resolved and include in your discharge summary.     Reference:    CHANTAL Jones MD. (2020, March 13). Acute respiratory distress syndrome: Clinical features, diagnosis, and complications in adults ([id number] REBECCA Emerson MD & [id number] BENEDICT Yost MD, Eds.). Retrieved November 13, 2020, from [email or web address]&source=search_result&selectedTitle=1~150&usage_type=default&display_rank=1  Form No. 34775

## 2023-01-19 NOTE — PLAN OF CARE
Problem: SLP  Goal: SLP Goal  Description: Short Term Goals:  1. Pt will participate in swallow eval to determine safest diet level   2. Pt will tolerate clear liquid diet with no audible dysphagia signs.   Outcome: Ongoing, Progressing   Pt seen at bedside in ICU, pt with flat affective and little to no interaction. Pt did not accept sips of water when presented, SLP had to gently bring tsp of water to lips. Continue NPO for now and will re-assess next date.

## 2023-01-19 NOTE — PLAN OF CARE
Problem: Adult Inpatient Plan of Care  Goal: Plan of Care Review  Outcome: Ongoing, Progressing  Goal: Patient-Specific Goal (Individualized)  Outcome: Ongoing, Progressing  Goal: Absence of Hospital-Acquired Illness or Injury  Outcome: Ongoing, Progressing  Goal: Optimal Comfort and Wellbeing  Outcome: Ongoing, Progressing     Problem: Diabetes Comorbidity  Goal: Blood Glucose Level Within Targeted Range  Outcome: Ongoing, Progressing     Problem: Infection  Goal: Absence of Infection Signs and Symptoms  Outcome: Ongoing, Progressing     Problem: Skin Injury Risk Increased  Goal: Skin Health and Integrity  Outcome: Ongoing, Progressing     Problem: Fall Injury Risk  Goal: Absence of Fall and Fall-Related Injury  Outcome: Ongoing, Progressing     Problem: Device-Related Complication Risk (Hemodialysis)  Goal: Safe, Effective Therapy Delivery  Outcome: Ongoing, Progressing   'POC reviewed with patient and family, all questions answered

## 2023-01-19 NOTE — SUBJECTIVE & OBJECTIVE
Interval History: No acute issues overnight. Afebrile. VSS  Ostomy with small amount of bowel sweat, patent and pink      Medications:  Continuous Infusions:  Scheduled Meds:   sodium chloride 0.9%   Intravenous Once    ceFEPime (MAXIPIME) IVPB  2 g Intravenous Q24H    heparin (porcine)  5,000 Units Subcutaneous Q8H    insulin detemir U-100  5 Units Subcutaneous Daily    metronidazole  500 mg Intravenous Q8H     PRN Meds:acetaminophen, dextrose 10%, dicyclomine, glucagon (human recombinant), heparin (porcine), hydrALAZINE, HYDROcodone-acetaminophen, HYDROmorphone, insulin aspart U-100, LIDOcaine, melatonin, ondansetron, promethazine, sodium chloride 0.9%, sodium chloride 0.9%     Review of patient's allergies indicates:   Allergen Reactions    Crestor [rosuvastatin] Swelling    Gluten protein      Objective:     Vital Signs (Most Recent):  Temp: 98.8 °F (37.1 °C) (01/19/23 0348)  Pulse: 91 (01/19/23 0600)  Resp: 14 (01/19/23 0600)  BP: (!) 157/70 (01/19/23 0600)  SpO2: 100 % (01/19/23 0600)   Vital Signs (24h Range):  Temp:  [98.6 °F (37 °C)-99.7 °F (37.6 °C)] 98.8 °F (37.1 °C)  Pulse:  [79-94] 91  Resp:  [9-17] 14  SpO2:  [97 %-100 %] 100 %  BP: (122-157)/(60-79) 157/70  Arterial Line BP: (125-172)/(51-61) 160/56     Weight: 60 kg (132 lb 4.4 oz)  Body mass index is 22.01 kg/m².    Intake/Output - Last 3 Shifts         01/17 0700  01/18 0659 01/18 0700  01/19 0659 01/19 0700 01/20 0659    I.V. (mL/kg)       Blood       Other 250      NG/ 780     IV Piggyback 716.2      Total Intake(mL/kg) 1106.2 (18.4) 780 (13)     Urine (mL/kg/hr)       Other 1877      Stool 0      Total Output 1877      Net -770.8 +780            Stool Occurrence 0 x              Physical Exam  Vitals and nursing note reviewed.   Constitutional:       General: She is not in acute distress.  Cardiovascular:      Rate and Rhythm: Normal rate and regular rhythm.   Pulmonary:      Effort: Pulmonary effort is normal. No respiratory distress.    Abdominal:      General: Abdomen is flat.      Palpations: Abdomen is soft.      Tenderness: There is abdominal tenderness (appropriate post op tenderness).      Comments: Midline incision cdi with dressing in place    Ostomy pink, patent with bowel sweat   Skin:     General: Skin is warm and dry.   Neurological:      Mental Status: She is alert.      Comments: No response to questions       Significant Labs:  I have reviewed all pertinent lab results within the past 24 hours.  CBC:   Recent Labs   Lab 01/19/23  0348   WBC 21.23*   RBC 3.11*   HGB 8.6*   HCT 25.0*   *   MCV 80*   MCH 27.7   MCHC 34.4     CMP:   Recent Labs   Lab 01/19/23  0348   *   CALCIUM 7.5*   ALBUMIN 2.1*   PROT 4.7*   *   K 4.1   CO2 22*      BUN 58*   CREATININE 4.5*   ALKPHOS 118   ALT 1,259*   AST 1,095*   BILITOT 0.8       Significant Diagnostics:  I have reviewed all pertinent imaging results/findings within the past 24 hours.

## 2023-01-19 NOTE — PROGRESS NOTES
[facility]  History & Physical  Family Medicine       Admit Date: 2023   LOS: 6 days     Chief Complaint/Reason for Admission: Shock    HPI     Patient is a 73 y/o female with PMHx of diabetes type 2 (treated with metformin), HTN, and celiac disease (following a strict diet) who presented to OSH after falling and injuring her face while getting out of bed. The patient reported experiencing nausea, vomiting, diarrhea, abdominal pain, and weakness starting the day before. She has had a weight loss of 60 pounds over an extended period of time, which may have been partially caused by her diet. The patient's review of systems was otherwise normal. Of note patient is currently taking Lyrica, which has recently been increased. On evaluation at bedside, patient feels good just with mild abdominal pain. No other symptoms. Denies LOC with falls, does not remember if she hit her head.     ED Course: Presented hypotensive and tachycardic. Labs showed elevated WBC of 26.16, Glucose 269, , LA 7, and Beta-hydroxybutyrate 0.6. She was acidotic with ph of 7.282 and AG 21. Rest of the labs unremarkable. CT ab/pel revealed distention of the colon with a large amount of stool and fluid, and a segment of the colon that appeared narrowed with possible thickening of the wall, strongly suspected to be a colonic mass. S/p enema and disimpaction. The patient was given fluids (3 L) and antibiotics and transferred for further evaluation and possible surgery       Interval History: Patient required repeat exploratory lap to stop a bleed. Patient has been stable overnight but shows worsened cognition.         PMHx  Past Medical History:   Diagnosis Date    Anxiety     Celiac disease 2018    Celiac disease     Depression     Diabetes mellitus     Family history of breast cancer in mother      at age 68    Hyperlipidemia     Hypertension     Meniere disease     Menopause     Peptic ulcer     Reflux  esophagitis     Urinary tract infection     Vaginal infection     Vaginal Pap smear 04/07/2014    Pap/Hpv Negative         PSHx  Past Surgical History:   Procedure Laterality Date    APPENDECTOMY      BREAST SURGERY      Tags    CARPAL TUNNEL RELEASE Bilateral 09/2017    ,     CLOSURE, COLOSTOMY Left 1/16/2023    Procedure: CLOSURE, COLOSTOMY;  Surgeon: Manuel Javier MD;  Location: [facility] OR;  Service: General;  Laterality: Left;    COLONOSCOPY  04/2018    Normal  ( Juan A)     COLOSTOMY Right 1/16/2023    Procedure: CREATION, COLOSTOMY;  Surgeon: Manuel Javier MD;  Location: [facility] OR;  Service: General;  Laterality: Right;    DILATION AND CURETTAGE OF UTERUS  1986    DUPUYTREN CONTRACTURE RELEASE Bilateral 09/2017    HYSTERECTOMY  1987    BEAU w/ appy, no BSO     INJECTION OF NEUROLYTIC AGENT AROUND LUMBAR SYMPATHETIC NERVE N/A 1/6/2022    Procedure: BLOCK, LUMBAR SYMPATHETIC;  Surgeon: Souleymane Rizo Jr., MD;  Location: [facility] PAIN MGT;  Service: Pain Management;  Laterality: N/A;  no pacemaker   pt is diabetic     INJECTION OF NEUROLYTIC AGENT AROUND LUMBAR SYMPATHETIC NERVE N/A 8/25/2022    Procedure: BLOCK, LUMBAR SYMPATHETIC;  Surgeon: Souleymane Rizo Jr., MD;  Location: [facility] PAIN MGT;  Service: Pain Management;  Laterality: N/A;  diabetic     LAPAROTOMY, EXPLORATORY N/A 1/14/2023    Procedure: LAPAROTOMY, EXPLORATORY;  Surgeon: Manuel Javier MD;  Location: [facility] OR;  Service: General;  Laterality: N/A;    LAPAROTOMY, EXPLORATORY N/A 1/16/2023    Procedure: LAPAROTOMY, EXPLORATORY;  Surgeon: Manuel Javier MD;  Location: [facility] OR;  Service: General;  Laterality: N/A;    SHOULDER SURGERY  2009 & 2010    right rotator cuff    TONSILLECTOMY      UPPER GASTROINTESTINAL ENDOSCOPY  04/2018       Family History  Family History   Problem Relation Age of Onset    Vision loss Father     Arthritis Father     Breast cancer Mother     Cancer Mother     Vision loss Mother     Hyperlipidemia Sister      Breast cancer Paternal Aunt     Cancer Paternal Aunt     Diabetes Paternal Grandfather     Vision loss Sister     Kidney disease Neg Hx        Social  Social History     Socioeconomic History    Marital status:    Tobacco Use    Smoking status: Never    Smokeless tobacco: Never   Substance and Sexual Activity    Alcohol use: No    Drug use: Never    Sexual activity: Not Currently     Partners: Male     Birth control/protection: See Surgical Hx     Comment: :      Social Determinants of Health     Financial Resource Strain: Low Risk     Difficulty of Paying Living Expenses: Not hard at all   Food Insecurity: No Food Insecurity    Worried About Running Out of Food in the Last Year: Never true    Ran Out of Food in the Last Year: Never true   Transportation Needs: No Transportation Needs    Lack of Transportation (Medical): No    Lack of Transportation (Non-Medical): No   Physical Activity: Sufficiently Active    Days of Exercise per Week: 7 days    Minutes of Exercise per Session: 50 min   Stress: Stress Concern Present    Feeling of Stress : Very much   Social Connections: Unknown    Frequency of Communication with Friends and Family: More than three times a week    Frequency of Social Gatherings with Friends and Family: More than three times a week    Active Member of Clubs or Organizations: Yes    Attends Club or Organization Meetings: More than 4 times per year    Marital Status:    Housing Stability: Low Risk     Unable to Pay for Housing in the Last Year: No    Number of Places Lived in the Last Year: 1    Unstable Housing in the Last Year: No         Allergies  Review of patient's allergies indicates:   Allergen Reactions    Crestor [rosuvastatin] Swelling    Gluten protein        ROS- As per HPI, otherwise negative  Review of Systems   Unable to perform ROS: Mental status change     Objective  Vitals(Most Recent)      Temp: 98.8 °F (37.1 °C) (01/19/23 0348)  Pulse: 91 (01/19/23  0600)  Resp: 14 (01/19/23 0600)  BP: (!) 157/70 (01/19/23 0600)  SpO2: 100 % (01/19/23 0600)           Vitals Range  Temp:  [98.6 °F (37 °C)-99.7 °F (37.6 °C)]   Pulse:  [79-94]   Resp:  [9-17]   BP: (122-157)/(60-79)   SpO2:  [97 %-100 %]   Arterial Line BP: (125-172)/(51-61)     I/O  I/O last 3 completed shifts:  In: 1260.5 [NG/GT:890; IV Piggyback:370.5]  Out: -     Intake/Output Summary (Last 24 hours) at 1/19/2023 0719  Last data filed at 1/19/2023 0600  Gross per 24 hour   Intake 780 ml   Output --   Net 780 ml           Physical Exam  Physical Exam  Constitutional:       General: She is not in acute distress.     Appearance: Normal appearance. She is normal weight.   HENT:      Head: Normocephalic and atraumatic.      Comments: Facial laceration on lip and ecchymosis of L eye     Mouth/Throat:      Mouth: Mucous membranes are moist.      Pharynx: Oropharynx is clear.   Eyes:      Extraocular Movements: Extraocular movements intact.      Conjunctiva/sclera: Conjunctivae normal.      Pupils: Pupils are equal, round, and reactive to light.   Cardiovascular:      Rate and Rhythm: Normal rate and regular rhythm.      Pulses: Normal pulses.      Heart sounds: Normal heart sounds.   Pulmonary:      Effort: Pulmonary effort is normal.      Breath sounds: Normal breath sounds.   Abdominal:      General: Abdomen is flat. There is no distension.      Palpations: Abdomen is soft.      Tenderness: There is generalized abdominal tenderness. There is guarding.   Musculoskeletal:      Cervical back: Normal range of motion.      Right lower leg: No edema.      Left lower leg: No edema.   Skin:     General: Skin is warm.      Capillary Refill: Capillary refill takes 2 to 3 seconds.   Neurological:      General: No focal deficit present.      Mental Status: She is oriented to person, place, and time. She is confused.      Motor: Weakness present.      Comments: Patient stare and tracks with gaze, withdrawals to pain  Did follow  commands but did not answer questions on exam  Right UE weakness when seen by PT/OT, Right UE weakness improved but still present       Labs  Recent Labs   Lab 01/19/23  0348   WBC 21.23*   RBC 3.11*   HGB 8.6*   HCT 25.0*   *   MCV 80*   MCH 27.7   MCHC 34.4        Recent Labs   Lab 01/19/23  0348   CALCIUM 7.5*   PROT 4.7*   *   K 4.1   CO2 22*      BUN 58*   CREATININE 4.5*   ALKPHOS 118   ALT 1,259*   AST 1,095*   BILITOT 0.8          Lab Results   Component Value Date    INR 1.3 (H) 01/17/2023    INR 1.6 (H) 01/16/2023    INR 1.6 (H) 01/16/2023    APTT 33.2 (H) 01/17/2023       No results for input(s): TROPONINI, CPKMB, CPK in the last 72 hours.      A1c:   Lab Results   Component Value Date    HGBA1C 5.8 (H) 11/01/2022     , Last Gluc:   Recent Labs   Lab 01/17/23  1538 01/17/23  1900 01/17/23  1920 01/18/23  0419 01/18/23  0808 01/18/23  1102   POCTGLUCOSE 66* 66* 126* 102 83 96         TSH:   Lab Results   Component Value Date    TSH 1.821 01/13/2023       UA  Urinalysis        Micro  Microbiology Results (last 7 days)       Procedure Component Value Units Date/Time    Blood culture [[id number]] Collected: 01/17/23 1927    Order Status: Completed Specimen: Blood Updated: 01/19/23 0613     Blood Culture, Routine No Growth to date      No Growth to date    Blood culture [[id number]] Collected: 01/17/23 1927    Order Status: Completed Specimen: Blood Updated: 01/19/23 0613     Blood Culture, Routine No Growth to date      No Growth to date    Blood culture [[id number]]     Order Status: Canceled Specimen: Blood     Clostridium difficile EIA [[id number]] Collected: 01/14/23 0241    Order Status: Completed Specimen: Stool Updated: 01/16/23 0954     C. diff Antigen Negative     C difficile Toxins A+B, EIA Negative     Comment: Testing not recommended for children <24 months old.                ABG  Recent Labs   Lab 01/14/23  0842 01/14/23  1207 01/16/23  1615   PH 7.142* 7.304* 7.319*   PCO2 49.2*  26.1* 30.7*   PO2 160* 95 74*   HCO3 16.8* 12.9* 15.8*   POCSATURATED 99 97 94*   BE -12 -13 -10         Imaging           Assessment/Plan:  Lily Green is a 72 y.o. female patient w/ PMHx of diabetes type 2 (treated with metformin), HTN, and celiac disease (following a strict diet)     Here with   1. Shock  2. ischemic bowel/ acute abdomen     Neuro/Psych  #AMS, Acute illness delirium  Appears lethargic, confused, but does answer questions appropriately  H/o of frequent falls  Home Lyrica recently increased.  CTH unremarkable  Minimally responsive on interview  Hypovolemic shock and surgery on 1/17/23  CT head for weakness in the right arm  CT head negative  Plan  - Continue to monitor  No night time vitals or labs  - Encourage regular sleep pattern      CV  #Shock  Poss septic, LA 7 > 6.8  Responsive to fluids  - Pressors held  - Will continue abx  - IV pressor support prn   - F/u echo  - Trend lactate  - DC       # Anemia  H/H dropped from 10 to 6.2 overnight  Down from 17 on admit  Initial factors dilution, and blood loss from colectomy 1/4/23  New drop of unknown cause  Bleed stopped in OR  H/H 10.1  PTINR 13.6/1.3  Plan    Follow up coagulopathy work up    Monitor for signs of bleeding    Pulm  On RA no issues  O2 sat 100 on 4L NC     FEN/GI  #Ischemic bowel, secondary to Toxic [place] colon  #Acute Abdomen  Although abdomen non concerning, imaging concerning  Also elevated LA concerning for ischemic bowel  Amylase greatly elevated pointing to ischemia  CT ab/pel revealing distention of the colon with a large amount of stool and fluid, and a segment of the colon that appeared narrowed with possible thickening of the wall, strongly suspected to be a colonic mass  Surgery s/p colon resection revealed no evidence of mass  No evidence of prior CV disease   Etiology unknown  Bladder pressure wnl  ABG ph 7.319 PCO2 30.7, O2 74 Bicarb 15.8  Gen surg consulted  - S/p repeat ex lap for bleed  - Diet: Tube  feeds, Diabetisource titrating to 60ml/hr  - Keep Mg 2, K 4  - Zofran prn for n/v   - F/u Gen surg recs  - maintain euvolemia  - Dietary concerns tube feeds  swallow  Shock Liver    Monitor AST/ALT  Repeat US to assess for cholecystitis  AST/ALT improved to 1095/1259    #Distended Gallbladder  Visualized on CT and US  Not showing prominent signs of infection on initial imaging  Bilirubin WNL         RENAL  #HINA  Likely 2/2 to shock  BUN/Cr: 58/1.7 > 60/1.9  UOP: minimal despite aggressive IVF  - HD on today 1/19/23  -  in 24 hr  -Worsened BUN 62/4.1  -Phos 8.5    Plan  Strict I&Os  Avoid renal toxic meds   Continue to monitor renal status and urine output   Repeat HD 1/16/23    dc [place]  Bladder scans    Heme  Status post bowel resection  H/H down from 17.2/50.7 on admit  10/27.9  WBC increased 13 >18 monitor  PT/INR 16.4/1.6  DVT prophylaxis: Restart pending Surgery rec      # Anemia  H/H dropped from 10 to 6.2 overnight  Down from 17 on admit  Initial factors dilution, and blood loss from colectomy 1/4/23  New drop of unknown cause  No free fluid visualized on Bedside abd US  Plan    H/H down to 8.6/9.0     Possible dilution in setting of increased volume    Monitor signs of bleeding, bruising, bloody ostomy output    Follow up coagulopathy work up            Endo  #possible starvation ketoacidosis  #T2DM   Elevated Bgs, AG, and slight B-hydrox  - Supportive management  - Maintain glucose 140-180  - IVF prn  - SSI and long acting  - Holding AM levemir in setting of repeat low BG  - F/u BMP      ID  Gram negative Bacteremia  - Gram negative rods reported in Blood culture  - Continue Cefepime, flagyl  - Blood culture ordered   - Call lab to discuss sensitivities   - May need to reassess gallbladder     DVT PPx: SCD,heparin  Diet: tube feeds  GI PPX: None  Deconditioning: PT/OT  Code Status: Full        Dispo  - Continue ICU Care      1/19/2023 Bal Burr M.D., -II  [facility]

## 2023-01-19 NOTE — PROGRESS NOTES
[facility] - Intensive Care  General Surgery  Progress Note    Subjective:     History of Present Illness:  No notes on file    Post-Op Info:  Procedure(s) (LRB):  LAPAROTOMY, EXPLORATORY (N/A)  CLOSURE, COLOSTOMY (Left)  CREATION, COLOSTOMY (Right)   3 Days Post-Op     Interval History: No acute issues overnight. Afebrile. VSS  Ostomy with small amount of bowel sweat, patent and pink      Medications:  Continuous Infusions:  Scheduled Meds:   sodium chloride 0.9%   Intravenous Once    ceFEPime (MAXIPIME) IVPB  2 g Intravenous Q24H    heparin (porcine)  5,000 Units Subcutaneous Q8H    insulin detemir U-100  5 Units Subcutaneous Daily    metronidazole  500 mg Intravenous Q8H     PRN Meds:acetaminophen, dextrose 10%, dicyclomine, glucagon (human recombinant), heparin (porcine), hydrALAZINE, HYDROcodone-acetaminophen, HYDROmorphone, insulin aspart U-100, LIDOcaine, melatonin, ondansetron, promethazine, sodium chloride 0.9%, sodium chloride 0.9%     Review of patient's allergies indicates:   Allergen Reactions    Crestor [rosuvastatin] Swelling    Gluten protein      Objective:     Vital Signs (Most Recent):  Temp: 98.8 °F (37.1 °C) (01/19/23 0348)  Pulse: 91 (01/19/23 0600)  Resp: 14 (01/19/23 0600)  BP: (!) 157/70 (01/19/23 0600)  SpO2: 100 % (01/19/23 0600)   Vital Signs (24h Range):  Temp:  [98.6 °F (37 °C)-99.7 °F (37.6 °C)] 98.8 °F (37.1 °C)  Pulse:  [79-94] 91  Resp:  [9-17] 14  SpO2:  [97 %-100 %] 100 %  BP: (122-157)/(60-79) 157/70  Arterial Line BP: (125-172)/(51-61) 160/56     Weight: 60 kg (132 lb 4.4 oz)  Body mass index is 22.01 kg/m².    Intake/Output - Last 3 Shifts         01/17 0700 01/18 0659 01/18 0700 01/19 0659 01/19 0700 01/20 0659    I.V. (mL/kg)       Blood       Other 250      NG/ 780     IV Piggyback 716.2      Total Intake(mL/kg) 1106.2 (18.4) 780 (13)     Urine (mL/kg/hr)       Other 1877      Stool 0      Total Output 1877      Net -770.8 +780            Stool Occurrence 0 x               Physical Exam  Vitals and nursing note reviewed.   Constitutional:       General: She is not in acute distress.  Cardiovascular:      Rate and Rhythm: Normal rate and regular rhythm.   Pulmonary:      Effort: Pulmonary effort is normal. No respiratory distress.   Abdominal:      General: Abdomen is flat.      Palpations: Abdomen is soft.      Tenderness: There is abdominal tenderness (appropriate post op tenderness).      Comments: Midline incision cdi with dressing in place    Ostomy pink, patent with bowel sweat   Skin:     General: Skin is warm and dry.   Neurological:      Mental Status: She is alert.      Comments: No response to questions       Significant Labs:  I have reviewed all pertinent lab results within the past 24 hours.  CBC:   Recent Labs   Lab 01/19/23  0348   WBC 21.23*   RBC 3.11*   HGB 8.6*   HCT 25.0*   *   MCV 80*   MCH 27.7   MCHC 34.4     CMP:   Recent Labs   Lab 01/19/23  0348   *   CALCIUM 7.5*   ALBUMIN 2.1*   PROT 4.7*   *   K 4.1   CO2 22*      BUN 58*   CREATININE 4.5*   ALKPHOS 118   ALT 1,259*   AST 1,095*   BILITOT 0.8       Significant Diagnostics:  I have reviewed all pertinent imaging results/findings within the past 24 hours.    Assessment/Plan:     Large bowel ischemia  71 yo female now s/p total abdominal colectomy with end ileostomy, takeback for bleeding on 1/16, now stable and off pressors    NPO  H/H is stable, continue to trend  Afebrile but continued leukocytosis   Will watch for ostomy output  Rest of care per primary team          Sekou Villatoro MD  General Surgery  [place] - Intensive Care

## 2023-01-19 NOTE — PT/OT/SLP PROGRESS
Occupational Therapy   Treatment    Name: Lily Green  MRN: [id number]  Admitting Diagnosis:  Peritonitis  3 Days Post-Op    Recommendations:     Discharge Recommendations: other (see comments) (post acute therapy)  Discharge Equipment Recommendations:  other (see comments) (TBD)  Barriers to discharge:  Other (Comment) (limited to bed level activity)    Assessment:     Lily Green is a 72 y.o. female with a medical diagnosis of Peritonitis.  She presents with .The primary encounter diagnosis was Peritonitis [K65.9 (ICD-10-CM)]. Diagnoses of Submucosal colonic lesion, Shock, Large bowel ischemia, ATN (acute tubular necrosis), HINA (acute kidney injury), Acute hypoxemic respiratory failure, and Elevated lactic acid level were also pertinent to this visit.  . Performance deficits affecting function are weakness, visual deficits, abnormal tone, impaired joint extensibility, impaired endurance, impaired cognition, decreased ROM, impaired sensation, decreased coordination, impaired coordination, impaired self care skills, decreased upper extremity function, impaired fine motor, impaired functional mobility, decreased lower extremity function, impaired skin, decreased safety awareness, gait instability, impaired balance, pain.     In comparison to yesterday's initial eval in which only trace movement in RUE, demonstrated improvement of  2-/5 in R elbow flexor (although inconsistently), 1/5 R elbow extensors, and slight gross grasp but continued 0/5 R shoulder. Able to whisper name and with choices note being in the hospital setting. Patient also with improved tracking to the right today vs yesterday's session.  Min frustration 2/2 limited communication despite attempts with modified communication board and use of yes/no with blinking. Anticipate will need post acute therapy. Specific d/c disposition recommendations and DME recommendations pending progress.    Rehab Prognosis:  Good; patient would  benefit from acute skilled OT services to address these deficits and reach maximum level of function.       Plan:     Patient to be seen 3 x/week to address the above listed problems via self-care/home management, therapeutic activities, therapeutic exercises, neuromuscular re-education  Plan of Care Expires: 02/15/23  Plan of Care Reviewed with: patient, spouse    Subjective     Pain/Comfort:  Pain Rating 1: other (see comments) (nods head no)  Pain Addressed 1: Pre-medicate for activity, Reposition, Cessation of Activity, Nurse notified  Pain Rating Post-Intervention 1: other (see comments) (min facial grimacing with assisted ROM)    Objective:     Communicated with: nursing prior to session.  Patient found HOB elevated with Other (comments) (restraints; telemetry; blood pressure cuff; peripheral IV; arterial line; NG tube) upon OT entry to room. PT in room for brief overlap of session.    General Precautions: Standard, fall    Orthopedic Precautions:N/A  Braces: N/A  Respiratory Status: Room air     Occupational Performance:       [facility] 6 Click ADL: 8    Treatment & Education:  Patient with increased attention, scanning, and alertness on this date demonstrating self-initiated focused eye contact as well as less direct instruction for scanning across midline to therapist who intentionally approaching patient on right side.alert and oriented to self (mouths her name) and with choices able to discern in hospital. Guided patient for AAROM BUE shoulder flexion/extension, elbow flexion/extension with pronation / supination. (See above assessment for improved MMT on this date). Incorporated preparatory grooming tasks of placement of chapstick on mouth with emphasis on facilitating folowing simple motor command of smacking lips open/closed with mod cues for mouth movement, dependent on therapist for placement of chapstick. Educated/ instructed spouse on performing PROM R elbow flexion/extension. Incorporated neuroreed  techniques of tapping, use of cold lotion for awakening, and quick strokes to RUE. Attempts for use of communication modified board with left hand with 25% participation. Educated spouse on strategies to reduce patient's potential discomfort (post head injury 2/2 recent fall) such as dimming lights, turning off tv.    Patient left HOB elevated with all lines intact, call button in reach, bed alarm on, nursing  notified, and spouse present    GOALS:   Multidisciplinary Problems       Occupational Therapy Goals          Problem: Occupational Therapy    Goal Priority Disciplines Outcome Interventions   Occupational Therapy Goal     OT, PT/OT Ongoing, Progressing    Description: Goals to be met by: 2/15/2023     Patient will increase functional independence with ADLs by performing:    Feeding with min assist and incorporation of upright seated posture with incorporation of conservation techniques  UE Dressing with Moderate Assistance with easy on overhead clothing  Grooming while seated with Minimal Assistance.  Rolling bilaterally with moderate assist to increased ability to self-reposition to decrease pressure injury and to decrease burden of care during pericare  Sitting eob with min assist x8 minutes without adverse vital signs  Bed < > BSC transfer with moderate assist via least restrictive method  Increase BUE MMT grossly to 3+/5 to integrate dominant UE into daily living tasks                         Time Tracking:     OT Date of Treatment: 01/19/23  OT Start Time: 1542  OT Stop Time: 1622  OT Total Time (min): 40 min    Billable Minutes:Self Care/Home Management 10 min  Therapeutic Exercise 15 min  Neuromuscular Re-education 15 min    OT/RICHARD: OT          1/19/2023

## 2023-01-19 NOTE — ASSESSMENT & PLAN NOTE
71 yo female now s/p total abdominal colectomy with end ileostomy, takeback for bleeding on 1/16, now stable and off pressors    NPO  H/H is stable, continue to trend  Afebrile but continued leukocytosis   Will watch for ostomy output  Rest of care per primary team

## 2023-01-19 NOTE — PLAN OF CARE
Skilled OT treatment performed, note to follow. In comparison to yesterday's initial eval in which only trace movement in RUE, demonstrated improvement of  2-/5 in R elbow flexor (although inconsistently), 1/5 R elbow extensors, and slight gross grasp but continued 0/5 R shoulder. Able to whisper name and with choices note being in the hospital setting. Patient also with improved tracking to the right today vs yesterday's session.  Min frustration 2/2 limited communication despite attempts with modified communication board and use of yes/no with blinking. Anticipate will need post acute therapy. Specific d/c disposition recommendations and DME recommendations pending progress.    Problem: Occupational Therapy  Goal: Occupational Therapy Goal  Description: Goals to be met by: 2/15/2023     Patient will increase functional independence with ADLs by performing:    Feeding with min assist and incorporation of upright seated posture with incorporation of conservation techniques  UE Dressing with Moderate Assistance with easy on overhead clothing  Grooming while seated with Minimal Assistance.  Rolling bilaterally with moderate assist to increased ability to self-reposition to decrease pressure injury and to decrease burden of care during pericare  Sitting eob with min assist x8 minutes without adverse vital signs  Bed < > BSC transfer with moderate assist via least restrictive method  Increase BUE MMT grossly to 3+/5 to integrate dominant UE into daily living tasks    Outcome: Ongoing, Progressing

## 2023-01-19 NOTE — PT/OT/SLP EVAL
Speech Language Pathology Evaluation  Bedside Swallow    Patient Name:  Lily Green   MRN:  [id number]  Admitting Diagnosis: Peritonitis    Recommendations:                 General Recommendations:  ongoing swallow eval   Diet recommendations:  NPO, NPO   Aspiration Precautions: daily oral care at least 3-4x/day    General Precautions: Standard, fall  Communication strategies:  minimal interaction noted, flat affect     History per MD    Patient is a 73 y/o female with PMHx of diabetes type 2 (treated with metformin), HTN, and celiac disease (following a strict diet) who presented to OSH after falling and injuring her face while getting out of bed. The patient reported experiencing nausea, vomiting, diarrhea, abdominal pain, and weakness starting the day before. She has had a weight loss of 60 pounds over an extended period of time, which may have been partially caused by her diet. The patient's review of systems was otherwise normal. Of note patient is currently taking Lyrica, which has recently been increased. On evaluation at bedside, patient feels good just with mild abdominal pain. No other symptoms. Denies LOC with falls, does not remember if she hit her head.     ED Course: Presented hypotensive and tachycardic. Labs showed elevated WBC of 26.16, Glucose 269, , LA 7, and Beta-hydroxybutyrate 0.6. She was acidotic with ph of 7.282 and AG 21. Rest of the labs unremarkable. CT ab/pel revealed distention of the colon with a large amount of stool and fluid, and a segment of the colon that appeared narrowed with possible thickening of the wall, strongly suspected to be a colonic mass. S/p enema and disimpaction. The patient was given fluids (3 L) and antibiotics and transferred for further evaluation and possible surgery     Past Medical History:   Diagnosis Date    Anxiety     Celiac disease 05/2018    Celiac disease     Depression     Diabetes mellitus     Family history of breast cancer in  mother      at age 68    Hyperlipidemia     Hypertension     Meniere disease     Menopause     Peptic ulcer     Reflux esophagitis     Urinary tract infection     Vaginal infection     Vaginal Pap smear 2014    Pap/Hpv Negative        Past Surgical History:   Procedure Laterality Date    APPENDECTOMY      BREAST SURGERY      Tags    CARPAL TUNNEL RELEASE Bilateral 2017    ,     CLOSURE, COLOSTOMY Left 2023    Procedure: CLOSURE, COLOSTOMY;  Surgeon: Manuel Javier MD;  Location: [facility] OR;  Service: General;  Laterality: Left;    COLONOSCOPY  2018    Normal  ([place] [facility])     COLOSTOMY Right 2023    Procedure: CREATION, COLOSTOMY;  Surgeon: Manuel Javier MD;  Location: [facility] OR;  Service: General;  Laterality: Right;    DILATION AND CURETTAGE OF UTERUS  1986    DUPUYTREN CONTRACTURE RELEASE Bilateral 2017    HYSTERECTOMY  1987    BEAU w/ appy, no BSO     INJECTION OF NEUROLYTIC AGENT AROUND LUMBAR SYMPATHETIC NERVE N/A 2022    Procedure: BLOCK, LUMBAR SYMPATHETIC;  Surgeon: Souleymane Rizo Jr., MD;  Location: [facility] PAIN MGT;  Service: Pain Management;  Laterality: N/A;  no pacemaker   pt is diabetic     INJECTION OF NEUROLYTIC AGENT AROUND LUMBAR SYMPATHETIC NERVE N/A 2022    Procedure: BLOCK, LUMBAR SYMPATHETIC;  Surgeon: Souleymane Rizo Jr., MD;  Location: [facility] PAIN MGT;  Service: Pain Management;  Laterality: N/A;  diabetic     LAPAROTOMY, EXPLORATORY N/A 2023    Procedure: LAPAROTOMY, EXPLORATORY;  Surgeon: Manuel Javier MD;  Location: [facility] OR;  Service: General;  Laterality: N/A;    LAPAROTOMY, EXPLORATORY N/A 2023    Procedure: LAPAROTOMY, EXPLORATORY;  Surgeon: Manuel Javier MD;  Location: [facility] OR;  Service: General;  Laterality: N/A;    SHOULDER SURGERY   &     right rotator cuff    TONSILLECTOMY      UPPER GASTROINTESTINAL ENDOSCOPY  2018       Social History: unknown     Prior Intubation HX:  for surgery only 1 day     Modified  Barium Swallow: none per EMR     Chest X-Rays: Grossly unchanged cardiomediastinal contours.  Lungs appear grossly clear.  No definite pneumothorax or large volume pleural effusion.     CT: 1. Allowing for motion artifact, no convincing acute intracranial abnormalities.  There is sequela of chronic microvascular ischemic change and senescent change.     Prior diet: unknown       Subjective     ST consulted for swallow eval. RN did ok for SLP.   Patient goals: none stated, sister at bedside and hopeful she can take liquids.     Pain/Comfort:  Pain Rating 1: 0/10    Respiratory Status: nasal cannula     Objective:   Pt seen this date in ICU, pt with flat affect. Pt with no vocalizations and did not attend to SLP.  Sister present and attempted to encourage participation.     Oral Musculature Evaluation  Oral Musculature: unable to assess due to poor participation/comprehension, general weakness  Mucosal Quality: cracked, dry (bloody lips)  Lingual Strength and Mobility: impaired strength  Volitional Cough: not elicited  Volitional Swallow: delayed swallow, poor acceptance  Voice Prior to PO Intake: no phonation noted    Bedside Swallow Eval:   Consistencies Assessed:  Ice chips by tsp   Water by tsp      Oral Phase:   Anterior loss  Decreased closure around utensil  Poor oral acceptance    Pharyngeal Phase:   delayed swallow initation  multiple spontaneous swallows  Some facial grimacing noted when swallowing       Treatment: Per bedside assessment, pt with minimal acceptance of PO trials, will re-eval next date. Pt to stay NPO for now. Discussed with sister at bedside.     Assessment:     Lily Green is a 72 y.o. female admitted with Peritonitis who presents with an SLP diagnosis of dysphagia.     Goals:   Multidisciplinary Problems       SLP Goals          Problem: SLP    Goal Priority Disciplines Outcome   SLP Goal     SLP Ongoing, Progressing   Description: Short Term Goals:  1. Pt will participate in  swallow eval to determine safest diet level   2. Pt will tolerate clear liquid diet with no audible dysphagia signs.                        Plan:     Patient to be seen:  3 x/week   Plan of Care expires:  02/17/23  Plan of Care reviewed with:  patient, sibling   SLP Follow-Up:  Yes       Discharge recommendations:   (TBD)   Barriers to Discharge:  None    Time Tracking:     SLP Treatment Date:   01/19/23  Speech Start Time:  1234  Speech Stop Time:  1251     Speech Total Time (min):  17 min    Billable Minutes: Eval Swallow and Oral Function 17    01/19/2023

## 2023-01-19 NOTE — PROGRESS NOTES
Progress Note  [facility]    Attending: Dr. Kan  Admit Date: 1/13/2023  Today's Date: 01/19/2023    SUBJECTIVE:     Patient seen and examined this morning. Pt AAOx2 (name and location). ICU acquired weakness present. Pt responds to pain and at times voice. Still does not track with her eyes. RUE weakness improved today. Ongoing torticollis with pt's head turned to L side.    Review of Systems   Unable to perform ROS: Mental status change     OBJECTIVE:     Vital Signs Trends/Hx Reviewed  Vitals:    01/19/23 0400 01/19/23 0500 01/19/23 0520 01/19/23 0600   BP: 132/63 (!) 141/79  (!) 157/70   Pulse: 85 85 85 91   Resp: 14 12 11 14   Temp:       TempSrc:       SpO2: 99% 100% 100% 100%   Weight:       Height:            Physical Exam  Constitutional:       General: She is not in acute distress.     Appearance: Normal appearance. She is normal weight.   HENT:      Head: Normocephalic and atraumatic.      Comments: Facial laceration on lip and ecchymosis of L eye     Mouth/Throat:      Mouth: Mucous membranes are moist.      Pharynx: Oropharynx is clear.   Eyes:      Extraocular Movements: Extraocular movements intact.      Conjunctiva/sclera: Conjunctivae normal.      Pupils: Pupils are equal, round, and reactive to light.   Cardiovascular:      Rate and Rhythm: Normal rate and regular rhythm.      Pulses: Normal pulses.      Heart sounds: Normal heart sounds.   Pulmonary:      Effort: Pulmonary effort is normal.      Breath sounds: Normal breath sounds.   Abdominal:      General: Abdomen is flat. There is no distension.      Palpations: Abdomen is soft.      Tenderness: There is generalized abdominal tenderness. There is guarding.   Musculoskeletal:      Cervical back: Normal range of motion.      Right lower leg: No edema.      Left lower leg: No edema.   Skin:     General: Skin is warm.      Capillary Refill: Capillary refill takes 2 to 3 seconds.   Neurological:      General: No  focal deficit present.      Mental Status: She is oriented to person, place, and time. She is confused.      Motor: Weakness present.       Laboratory:  Recent Labs   Lab 01/19/23  0348   WBC 21.23*   RBC 3.11*   HGB 8.6*   HCT 25.0*   *   MCV 80*   MCH 27.7   MCHC 34.4     Recent Labs   Lab 01/19/23  0348   *   K 4.1      CO2 22*   BUN 58*   CREATININE 4.5*   MG 2.3     No results for input(s): PH, PCO2, PO2, HCO3, POCSATURATED, BE in the last 24 hours.      Chest Imaging:   No new chest imaging.     ASSESSMENT & RECOMMENDATIONS     Lily Green is a 72 y.o. female with Celiac's Disease, GERD, DDD, HTN, MARIXA/MDD who presented after a fall after 3-5 days of fatigue, weakness, and lethargy. Found to have dilated colon, taken for ex lap and eventually total colectomy on 1/14/23. Currently in ICU for shock and ischemic bowel/acute abdomen, s/p repeat ex lap on 1/16/23. Now with  ICU acquired weakness.     Neuro/Psych  Acute Encephalopathy  ICU Acquired Weakness  Worsening mental status noted 1/16/23; some improvement 1/17 as pt more alert and responsive.  Concern for new onset RUE weakness noted by PT/OT --> CT head ordered 1/18: negative  Continue to monitor  Delirium precautions - do not wake pt / no labs/vitals/meds overnight  D/c art line  Will need long rehab  Continue PT/OT     Mild TBI  - Patient fell at home and hit her head, was intermittently confused at first, although may have been due to metabolic encephalopathy  - Monitor  - Initial CTH was unremarkable     Degenerative Disc Disease  - Hold Lyrica     CV  Shock   - suspected 2/2 septic shock from ischemic bowel  - [place] II score 35% on 1/16/23  - Was initially on 2 pressors, weaned off 1/15 am --> Levophed restarted on 1/16 --> now not requiring vasopressors  - Continue metronidazole and cefepime  - Echo results - EF 35%, Grade I LV diastolic dysfunction, mild AR, mild MR, mild TR, no pulm htn.  - CTAP: Active extravasation  of arterial contrast in LUQ in keeping with rapid active hemorrhage. Large volume hemoperitoneum throughout abdomen and pelvis.  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  - C diff negative  - Elevated lactate at 1,278; concern for ischemic bowel  - General surgery consulted; repeat ex lap done on 1/16 for large bowel ischemia     Transaminitis  - Suspected 2/2 shock + congestion + acute illness  - Shock liver  - AST 1,443 --> 1,095  - ALT 1,339 --> 1,259  - Monitor, maintain good hemodynamics     RENAL  I/O last 3 completed shifts:  In: 1320.5 [NG/GT:950; IV Piggyback:370.5]  Out: -   I/O this shift:  In: 954.1 [NG/GT:400; IV Piggyback:554.1]  Out: 0       Intake/Output Summary (Last 24 hours) at 1/19/2023 1424  Last data filed at 1/19/2023 1300  Gross per 24 hour   Intake 1694.06 ml   Output 0 ml   Net 1694.06 ml         ATN/HINA  Lactic Acidosis  Metabolic Acidosis  - Cr baseline 0.4 - 0.6  s/p bradford placement, no evidence of obstruction  - Suspect ATN from shock  - Trialysis placed and s/p HD on 1/14/23  - CRRT running 1/17 AM  - Continue to monitor, nephrology following; switched to HD on 1/17  - Avoid nephrotoxins, strict I/O  - BUN/Cr: 58/4.5 (33/2.8) (50/2.9) (59/3.5) (39/2.4)  - Bradford d/c  - Follow up UOP and bladder scan  - HD as needed as per nephro     Heme  Anemia  - H/H 8.6/25 (9/24) (10/26)  - Previously Hb 6.2 from 10 overnight on 1/16  - Received 2 units PRBC  - Massive transfusion protcol initiated; transfused another 2  units PRBC 1/16  - 2 units FFP transfused  - Plan for DVT US  - Fibrinogen 396 (upper normal)  - Continue to monitor  - Space out labs to morning labs with HD     Endo  #T2DM  - Glucose goal 140-180  - SSI + Accuchecks for now  - TSH wnl     ID  Septic shock  Gram negative bacteremia  - Possibly 2/2 toxic megacolon although etiology remains a little unclear (ischemic, IBD, colon CA, CMV) --> large bowel ischemia  - C diff negative  - Oral vanc d/c 1/16  - Continue metronidazole and cefepime  - Bcx NGTD x2  - Consider re-imaging of gallbladder        DVT PPx: Heparin started 1/18  Diet: Clear liquids, gluten free, tube feeds at 20 mL/hr --> swallow eval today  GI PPX: N/A  Deconditioning: PT/OT when stable  Code Status: Full        Dispo  Pt to remain in ICU for current management.     Thank you for allowing us to participate in the care of this patient. We will continue to follow. Please call with questions.     Pt seen and discussed with Pulm/Crit Care attending physician Dr. Kan.      Whitney Barksdale MD, MPH  [facility] PGY-1  [facility]   01/19/2023     Pt seen and examined with Pulmonary/Critical Care team and this note reviewed and validated with the following additional comments:    Making slow but steady progress. Globally weak. Slightly more animated. No urine output yet.  Tolerating tube feeds.  Will try to get OOB.    Heriberto Kan MD  Phone [phone]

## 2023-01-20 LAB
ALBUMIN SERPL BCP-MCNC: 2.1 G/DL (ref 3.5–5.2)
ALP SERPL-CCNC: 131 U/L (ref 55–135)
ALT SERPL W/O P-5'-P-CCNC: 1031 U/L (ref 10–44)
ANION GAP SERPL CALC-SCNC: 9 MMOL/L (ref 8–16)
ANISOCYTOSIS BLD QL SMEAR: SLIGHT
AST SERPL-CCNC: 512 U/L (ref 10–40)
BASO STIPL BLD QL SMEAR: ABNORMAL
BASOPHILS NFR BLD: 0 % (ref 0–1.9)
BILIRUB SERPL-MCNC: 0.9 MG/DL (ref 0.1–1)
BUN SERPL-MCNC: 53 MG/DL (ref 8–23)
BURR CELLS BLD QL SMEAR: ABNORMAL
CALCIUM SERPL-MCNC: 7.9 MG/DL (ref 8.7–10.5)
CHLORIDE SERPL-SCNC: 100 MMOL/L (ref 95–110)
CO2 SERPL-SCNC: 24 MMOL/L (ref 23–29)
CREAT SERPL-MCNC: 3.9 MG/DL (ref 0.5–1.4)
DACRYOCYTES BLD QL SMEAR: ABNORMAL
DIFFERENTIAL METHOD: ABNORMAL
EOSINOPHIL NFR BLD: 0 % (ref 0–8)
ERYTHROCYTE [DISTWIDTH] IN BLOOD BY AUTOMATED COUNT: 19.4 % (ref 11.5–14.5)
EST. GFR  (NO RACE VARIABLE): 12 ML/MIN/1.73 M^2
GIANT PLATELETS BLD QL SMEAR: PRESENT
GLUCOSE SERPL-MCNC: 231 MG/DL (ref 70–110)
HBV SURFACE AG SERPL QL IA: NORMAL
HCT VFR BLD AUTO: 27.6 % (ref 37–48.5)
HGB BLD-MCNC: 9.3 G/DL (ref 12–16)
HYPOCHROMIA BLD QL SMEAR: ABNORMAL
IMM GRANULOCYTES # BLD AUTO: ABNORMAL K/UL (ref 0–0.04)
IMM GRANULOCYTES NFR BLD AUTO: ABNORMAL % (ref 0–0.5)
LYMPHOCYTES NFR BLD: 7 % (ref 18–48)
MAGNESIUM SERPL-MCNC: 2.2 MG/DL (ref 1.6–2.6)
MCH RBC QN AUTO: 27.4 PG (ref 27–31)
MCHC RBC AUTO-ENTMCNC: 33.7 G/DL (ref 32–36)
MCV RBC AUTO: 81 FL (ref 82–98)
MONOCYTES NFR BLD: 8 % (ref 4–15)
NEUTROPHILS NFR BLD: 85 % (ref 38–73)
NRBC BLD-RTO: 0 /100 WBC
OVALOCYTES BLD QL SMEAR: ABNORMAL
PHOSPHATE SERPL-MCNC: 2.1 MG/DL (ref 2.7–4.5)
PLATELET # BLD AUTO: 151 K/UL (ref 150–450)
PLATELET BLD QL SMEAR: ABNORMAL
PMV BLD AUTO: 10.9 FL (ref 9.2–12.9)
POCT GLUCOSE: 114 MG/DL (ref 70–110)
POCT GLUCOSE: 121 MG/DL (ref 70–110)
POCT GLUCOSE: 152 MG/DL (ref 70–110)
POCT GLUCOSE: 170 MG/DL (ref 70–110)
POCT GLUCOSE: 188 MG/DL (ref 70–110)
POCT GLUCOSE: 204 MG/DL (ref 70–110)
POCT GLUCOSE: 240 MG/DL (ref 70–110)
POCT GLUCOSE: 247 MG/DL (ref 70–110)
POCT GLUCOSE: 262 MG/DL (ref 70–110)
POCT GLUCOSE: 51 MG/DL (ref 70–110)
POIKILOCYTOSIS BLD QL SMEAR: SLIGHT
POLYCHROMASIA BLD QL SMEAR: ABNORMAL
POTASSIUM SERPL-SCNC: 4.7 MMOL/L (ref 3.5–5.1)
PROT SERPL-MCNC: 5.5 G/DL (ref 6–8.4)
RBC # BLD AUTO: 3.4 M/UL (ref 4–5.4)
SODIUM SERPL-SCNC: 133 MMOL/L (ref 136–145)
WBC # BLD AUTO: 32.8 K/UL (ref 3.9–12.7)

## 2023-01-20 PROCEDURE — 97110 THERAPEUTIC EXERCISES: CPT

## 2023-01-20 PROCEDURE — 97535 SELF CARE MNGMENT TRAINING: CPT

## 2023-01-20 PROCEDURE — 97530 THERAPEUTIC ACTIVITIES: CPT

## 2023-01-20 PROCEDURE — 94761 N-INVAS EAR/PLS OXIMETRY MLT: CPT

## 2023-01-20 PROCEDURE — 25000003 PHARM REV CODE 250: Performed by: STUDENT IN AN ORGANIZED HEALTH CARE EDUCATION/TRAINING PROGRAM

## 2023-01-20 PROCEDURE — 85027 COMPLETE CBC AUTOMATED: CPT | Performed by: FAMILY MEDICINE

## 2023-01-20 PROCEDURE — 83735 ASSAY OF MAGNESIUM: CPT | Performed by: STUDENT IN AN ORGANIZED HEALTH CARE EDUCATION/TRAINING PROGRAM

## 2023-01-20 PROCEDURE — 63600175 PHARM REV CODE 636 W HCPCS: Performed by: INTERNAL MEDICINE

## 2023-01-20 PROCEDURE — 92526 ORAL FUNCTION THERAPY: CPT

## 2023-01-20 PROCEDURE — 63600175 PHARM REV CODE 636 W HCPCS: Performed by: STUDENT IN AN ORGANIZED HEALTH CARE EDUCATION/TRAINING PROGRAM

## 2023-01-20 PROCEDURE — 25000003 PHARM REV CODE 250: Performed by: SURGERY

## 2023-01-20 PROCEDURE — 25000003 PHARM REV CODE 250: Performed by: INTERNAL MEDICINE

## 2023-01-20 PROCEDURE — 80053 COMPREHEN METABOLIC PANEL: CPT | Performed by: STUDENT IN AN ORGANIZED HEALTH CARE EDUCATION/TRAINING PROGRAM

## 2023-01-20 PROCEDURE — S0030 INJECTION, METRONIDAZOLE: HCPCS | Performed by: SURGERY

## 2023-01-20 PROCEDURE — 84100 ASSAY OF PHOSPHORUS: CPT | Performed by: STUDENT IN AN ORGANIZED HEALTH CARE EDUCATION/TRAINING PROGRAM

## 2023-01-20 PROCEDURE — 20000000 HC ICU ROOM

## 2023-01-20 PROCEDURE — 85007 BL SMEAR W/DIFF WBC COUNT: CPT | Performed by: FAMILY MEDICINE

## 2023-01-20 RX ORDER — IBUPROFEN 200 MG
16 TABLET ORAL
Status: DISCONTINUED | OUTPATIENT
Start: 2023-01-20 | End: 2023-01-26 | Stop reason: HOSPADM

## 2023-01-20 RX ORDER — IBUPROFEN 200 MG
24 TABLET ORAL
Status: DISCONTINUED | OUTPATIENT
Start: 2023-01-20 | End: 2023-01-26 | Stop reason: HOSPADM

## 2023-01-20 RX ORDER — INSULIN ASPART 100 [IU]/ML
1-10 INJECTION, SOLUTION INTRAVENOUS; SUBCUTANEOUS
Status: DISCONTINUED | OUTPATIENT
Start: 2023-01-20 | End: 2023-01-26 | Stop reason: HOSPADM

## 2023-01-20 RX ORDER — GLUCAGON 1 MG
1 KIT INJECTION
Status: DISCONTINUED | OUTPATIENT
Start: 2023-01-20 | End: 2023-01-26 | Stop reason: HOSPADM

## 2023-01-20 RX ORDER — HYDROCODONE BITARTRATE AND ACETAMINOPHEN 5; 325 MG/1; MG/1
1 TABLET ORAL ONCE
Status: COMPLETED | OUTPATIENT
Start: 2023-01-20 | End: 2023-01-20

## 2023-01-20 RX ADMIN — INSULIN ASPART 2 UNITS: 100 INJECTION, SOLUTION INTRAVENOUS; SUBCUTANEOUS at 04:01

## 2023-01-20 RX ADMIN — HYDROCODONE BITARTRATE AND ACETAMINOPHEN 1 TABLET: 5; 325 TABLET ORAL at 05:01

## 2023-01-20 RX ADMIN — INSULIN ASPART 4 UNITS: 100 INJECTION, SOLUTION INTRAVENOUS; SUBCUTANEOUS at 12:01

## 2023-01-20 RX ADMIN — HEPARIN SODIUM 5000 UNITS: 5000 INJECTION INTRAVENOUS; SUBCUTANEOUS at 09:01

## 2023-01-20 RX ADMIN — HEPARIN SODIUM 5000 UNITS: 5000 INJECTION INTRAVENOUS; SUBCUTANEOUS at 05:01

## 2023-01-20 RX ADMIN — METRONIDAZOLE 500 MG: 5 INJECTION, SOLUTION INTRAVENOUS at 12:01

## 2023-01-20 RX ADMIN — INSULIN DETEMIR 9 UNITS: 100 INJECTION, SOLUTION SUBCUTANEOUS at 09:01

## 2023-01-20 RX ADMIN — METRONIDAZOLE 500 MG: 5 INJECTION, SOLUTION INTRAVENOUS at 04:01

## 2023-01-20 RX ADMIN — DEXTROSE 125 ML: 10 SOLUTION INTRAVENOUS at 09:01

## 2023-01-20 RX ADMIN — METRONIDAZOLE 500 MG: 5 INJECTION, SOLUTION INTRAVENOUS at 08:01

## 2023-01-20 RX ADMIN — INSULIN ASPART 3 UNITS: 100 INJECTION, SOLUTION INTRAVENOUS; SUBCUTANEOUS at 08:01

## 2023-01-20 RX ADMIN — CEFEPIME 2 G: 2 INJECTION, POWDER, FOR SOLUTION INTRAVENOUS at 10:01

## 2023-01-20 RX ADMIN — ACETAMINOPHEN 650 MG: 325 TABLET ORAL at 09:01

## 2023-01-20 RX ADMIN — HEPARIN SODIUM 5000 UNITS: 5000 INJECTION INTRAVENOUS; SUBCUTANEOUS at 02:01

## 2023-01-20 NOTE — PT/OT/SLP PROGRESS
Physical Therapy Treatment    Patient Name:  Lily Green   MRN:  [id number]    Recommendations:     Discharge Recommendations: LTACH (long-term acute care hospital per chart) (post acute)  Discharge Equipment Recommendations:  (TBD)  Barriers to discharge:  pt requires increased level of assistance    Assessment:     Lily Green is a 72 y.o. female admitted with a medical diagnosis of Peritonitis.  She presents with the following impairments/functional limitations: weakness, impaired endurance, impaired self care skills, impaired functional mobility, gait instability, impaired balance, impaired skin, decreased coordination, decreased safety awareness Patient with improved alertness, command following and participation in therapy; strength now WFL throughout LEs.    Rehab Prognosis: Good; patient would benefit from acute skilled PT services to address these deficits and reach maximum level of function.    Recent Surgery: Procedure(s) (LRB):  LAPAROTOMY, EXPLORATORY (N/A)  CLOSURE, COLOSTOMY (Left)  CREATION, COLOSTOMY (Right) 4 Days Post-Op    Plan:     During this hospitalization, patient to be seen 5 x/week to address the identified rehab impairments via gait training, therapeutic activities, therapeutic exercises, neuromuscular re-education and progress toward the following goals:    Plan of Care Expires:  02/18/23    Subjective     Pain/Comfort:  Pain Rating 1: 0/10  Pain Rating Post-Intervention 1: 0/10      Objective:     Communicated with nurse prior to session.  Patient found up in stretcher chair with NG tube, telemetry, peripheral IV, blood pressure cuff, colostomy upon PT entry to room.     General Precautions: Standard, fall, respiratory  Orthopedic Precautions: N/A  Braces: N/A  Respiratory Status: Room air     Functional Mobility:  Patient sitting in stretcher chair- dependent slide transfer per nurses      AM-PAC 6 CLICK MOBILITY  Turning over in bed (including adjusting  bedclothes, sheets and blankets)?: 1  Sitting down on and standing up from a chair with arms (e.g., wheelchair, bedside commode, etc.): 1  Moving from lying on back to sitting on the side of the bed?: 1  Moving to and from a bed to a chair (including a wheelchair)?: 1  Need to walk in hospital room?: 1  Climbing 3-5 steps with a railing?: 1  Basic Mobility Total Score: 6       Treatment & Education:  Patient found sitting upright in stretcher chair; much more alert, following all one and two step commands, able to make needs known but needs reminders to use her voice; pt able to clean her mouth/teeth using 4 moistened dental swabs; pt performed BLE AROM ex incoordination with PLB and rest breaks between; pt leaned forward from back of chair with Marian to sit erect- pt able to hold~10 sec before needing to rest back; continued with PLB to recover; pt fatigued and requested to rest; strength improved to 4- to 4 grossly.    Patient left up in chair with all lines intact, call button in reach, and nurse notified..    GOALS:   Multidisciplinary Problems       Physical Therapy Goals          Problem: Physical Therapy    Goal Priority Disciplines Outcome Goal Variances Interventions   Physical Therapy Goal     PT, PT/OT Ongoing, Progressing     Description: Goals to be met by: 2023     Patient will increase functional independence with mobility by performin. Supine to sit with Maximal Assistance  2. Rolling with Moderate Assistance.  3. Sitting at edge of bed x10 minutes with Minimal Assistance and Moderate Assistance  4. Increased functional strength to at least 3 for transfers and bed mobility-met 2023  Amend goals as appropriate                         Time Tracking:     PT Received On: 23  PT Start Time: 1019     PT Stop Time: 1049  PT Total Time (min): 30 min     Billable Minutes: Therapeutic Exercise 20 Self Care 10 min    Treatment Type: Treatment  PT/PTA: PT     PTA Visit Number: 0      01/20/2023

## 2023-01-20 NOTE — PLAN OF CARE
Problem: Adult Inpatient Plan of Care  Goal: Plan of Care Review  Outcome: Ongoing, Progressing  Oriented to self; unable to answer other orientation questions. Follows commands and nods appropriately. Vital signs stable. Pain managed. Tube feeds at goal rate. Oliguric; bladder scanned with minimal urine in bladder. Repositioned frequently. Safety precautions in place. Plan of care reviewed with patient and family.

## 2023-01-20 NOTE — PLAN OF CARE
Problem: Physical Therapy  Goal: Physical Therapy Goal  Description: Goals to be met by: 2023     Patient will increase functional independence with mobility by performin. Supine to sit with Maximal Assistance  2. Rolling with Moderate Assistance.  3. Sitting at edge of bed x10 minutes with Minimal Assistance and Moderate Assistance  4. Increased functional strength to at least 3 for transfers and bed mobility  Amend goals as appropriate    Outcome: Ongoing, Progressing  Patient more alert and attentive than yesterday, focusing with direct eye contact for short periods of time; following simple commands with repetition~30-40% of time; able to attend to and gaze R across midline looking for therapist coming into the room; worked on AA R cervical rotation and propped pillow to keep pt from being held in L cx rotation; AROM to BLE ankle df/pf, knee ext; AAROM with facilitation hip/knee fl - initially with stiffness but more relaxed and flexible with repetition; soft instrumental music played during session; vitals good throughout; will recommend acute therapy placement and cont with POC.

## 2023-01-20 NOTE — PLAN OF CARE
Recommendation:  1. Add gluten free restrictions to diet.   2. Encourage intake of CL diet as tolerated.   3. Continue TF until ot tolerating diet with at least 50% intake at meals.   4. Montior weight/labs.   5. RD to continue to follow to monitor diet tolerance    Goals:  TF + diet to meet at least 85% EEN/EPN by RD follow up  Nutrition Goal Status: new

## 2023-01-20 NOTE — PT/OT/SLP PROGRESS
Physical Therapy Treatment    Patient Name:  Lily Green   MRN:  [id number]    Recommendations:     Discharge Recommendations:  (post acute therapy placement)  Discharge Equipment Recommendations:  (TBD)  Barriers to discharge: Patient requiring significant assist with bed mobility; not progressed to sitting    Assessment:     Lily Green is a 72 y.o. female admitted with a medical diagnosis of Peritonitis.  She presents with the following impairments/functional limitations: weakness, impaired endurance, impaired self care skills, impaired functional mobility, gait instability, impaired balance, impaired sensation, decreased coordination, impaired coordination, decreased upper extremity function, decreased lower extremity function, decreased safety awareness, pain, abnormal tone, decreased ROM, impaired skin, impaired joint extensibility, visual deficits  Patient more alert and attentive than yesterday, focusing with direct eye contact for short periods of time; following simple commands with repetition~30-40% of time; AROM of knee ext and ankle df/pf to end range; increased stiffness hip/knees and minimal grimacing with proximal ROM; will cont with POC and increase to bed mobility and possibly sitting EOB as appropriate..    Rehab Prognosis: Good and Fair; patient would benefit from acute skilled PT services to address these deficits and reach maximum level of function.    Recent Surgery: Procedure(s) (LRB):  LAPAROTOMY, EXPLORATORY (N/A)  CLOSURE, COLOSTOMY (Left)  CREATION, COLOSTOMY (Right) 3 Days Post-Op    Plan:     During this hospitalization, patient to be seen 5 x/week to address the identified rehab impairments via gait training, therapeutic activities, therapeutic exercises, neuromuscular re-education and progress toward the following goals:    Plan of Care Expires:  02/18/23    Subjective     Pain/Comfort:  Pain Rating 1:  (nodding head no)  Pain Addressed 1: Pre-medicate for  activity, Reposition, Cessation of Activity  Pain Rating Post-Intervention 1:  (minimal occasional grimacing with initial ROM of hip/knee)      Objective:     Communicated with nurse prior to session.  Patient found HOB elevated with peripheral IV, NG tube, arterial line, restraints, telemetry, blood pressure cuff upon PT entry to room.     General Precautions: Standard, fall  Orthopedic Precautions: N/A  Braces: N/A  Respiratory Status: Room air     Functional Mobility:  N/a      AM-PAC 6 CLICK MOBILITY  Turning over in bed (including adjusting bedclothes, sheets and blankets)?: 1  Sitting down on and standing up from a chair with arms (e.g., wheelchair, bedside commode, etc.): 1  Moving from lying on back to sitting on the side of the bed?: 1  Moving to and from a bed to a chair (including a wheelchair)?: 1  Need to walk in hospital room?: 1  Climbing 3-5 steps with a railing?: 1  Basic Mobility Total Score: 6       Treatment & Education:  Patient more alert and attentive than yesterday, focusing with direct eye contact for short periods of time; following simple commands with repetition~30-40% of time; able to attend to and gaze R across midline looking for therapist coming into the room; worked on AA R cervical rotation and propped pillow to keep pt from being held in L cx rotation; AROM to BLE ankle df/pf, knee ext; AAROM with facilitation hip/knee fl - initially with stiffness but more relaxed and flexible with repetition; soft instrumental music played during session; vitals good throughout    Patient left HOB elevated with  OT and  present..    GOALS:   Multidisciplinary Problems       Physical Therapy Goals          Problem: Physical Therapy    Goal Priority Disciplines Outcome Goal Variances Interventions   Physical Therapy Goal     PT, PT/OT Ongoing, Progressing     Description: Goals to be met by: 2023     Patient will increase functional independence with mobility by performin. Supine  to sit with Maximal Assistance  2. Rolling with Moderate Assistance.  3. Sitting at edge of bed x10 minutes with Minimal Assistance and Moderate Assistance  4. Increased functional strength to at least 3 for transfers and bed mobility  Amend goals as appropriate                         Time Tracking:     PT Received On: 01/19/23  PT Start Time: 1511     PT Stop Time: 1540  PT Total Time (min): 29 min     Billable Minutes: Therapeutic Exercise 29    Treatment Type: Treatment  PT/PTA: PT     PTA Visit Number: 0     01/19/2023

## 2023-01-20 NOTE — PLAN OF CARE
"Much improved participation and alertness in skilled OT treatment on this date. Volitionally and >80% of time following motor commands to move BUE. Improved mentation and communication. A&O x3 with increased time for processing. Improved self-awareness and self-efficacy as able to come up with positive affirmation of "I can get well!" with min prompting only. Able to verbalize localized discomfort in feet 2/2 neuropathy and decreased comfort of feet on cold temp leg rests (thus placement of barrier provided). See note to follow. Anticipate need for post acute therapy upon medically stable for d/c.     Problem: Occupational Therapy  Goal: Occupational Therapy Goal  Description: Goals to be met by: 2/15/2023     Patient will increase functional independence with ADLs by performing:    Feeding with min assist and incorporation of upright seated posture with incorporation of conservation techniques  UE Dressing with Moderate Assistance with easy on overhead clothing  Grooming while seated with Minimal Assistance.  Rolling bilaterally with moderate assist to increased ability to self-reposition to decrease pressure injury and to decrease burden of care during pericare  Sitting eob with min assist x8 minutes without adverse vital signs  Bed < > BSC transfer with moderate assist via least restrictive method  Increase BUE MMT grossly to 3+/5 to integrate dominant UE into daily living tasks    Outcome: Ongoing, Progressing     "

## 2023-01-20 NOTE — PROGRESS NOTES
"Lily Green is a 72 y.o. female patient presenting with toxic megacolon s/p colectomy with post-operative intraperitoneal bleed leading to hemorrhagic shock s/p surgical intervention with stabilization of Hgb and bleed. Patient remains in ICU for high nursing needs and severe ICU delirium/weakness.    Active Hospital Problems    Diagnosis  POA    *Peritonitis [K65.9]  Yes    Elevated lactic acid level [R79.89]  Yes    Shock [R57.9]  No    Large bowel ischemia [K55.9]  Yes    ATN (acute tubular necrosis) [N17.0]  No    HINA (acute kidney injury) [N17.9]  Yes    Acute hypoxemic respiratory failure [J96.01]  No      Resolved Hospital Problems   No resolved problems to display.     Temp: 98.3 °F (36.8 °C) (01/19/23 2300)  Pulse: 79 (01/20/23 0700)  Resp: 16 (01/20/23 0700)  BP: 137/67 (01/20/23 0700)  SpO2: 99 % (01/20/23 0700)  Weight: 59 kg (130 lb 1.1 oz) (01/20/23 0600)  Height: 5' 5" (165.1 cm) (01/17/23 1656)    Subjective  Improved mentation, verbalizing, wanting NGT out to eat. Significant ostomy output on 60cc/hr of tube feeds. Hemodynamically stable, O2sats 98% on RA. Febrile overnight, remains on cefepime/flagyl.    Objective:  General Appearance:  Not in pain, in no acute distress and uncomfortable.    Vital signs: (most recent): Blood pressure 139/74, pulse 82, temperature 98.5 °F (36.9 °C), temperature source Oral, resp. rate (!) 23, height 5' 5" (1.651 m), weight 59 kg (130 lb 1.1 oz), SpO2 100 %.  Vital signs are normal.    Output: No urine output and producing stool.    HEENT: (Bruising noted to lip and left side of face; NGT in place; RIJ trialysis line with dried blood but otherwise without surrounding erythema)    Lungs:  Normal effort.  She is not in respiratory distress.  No stridor.  No wheezes or rhonchi.    Heart: Normal rate.  Regular rhythm.    Abdomen: (Ostomy in place with significant output, diffuse tenderness to palpation).  There is no rebound tenderness.  There is no " guarding.     Extremities: There is dependent edema.  (Significant weakness)  Neurological: Patient is alert.  (Reduced strength throughout, speaking, oriented).    Skin:  Warm and dry.  There is ecchymosis (to face).        Assessment & Plan    Toxic megacolon  - s/p colectomy now with end ileostomy, ischemic bowel noted intra-op  - surgery following  - Abx as below    Ischemic bowel  - see above    Hemorrhagic shock (resolved)  - post-op complication, now resolved, off pressors, Hgb stable    GNR bacteremia  - in setting of above  - on cefepime/flagyl  - given fever overnight, would continue Abx therapy for now, treat for 10-14d total     ICU delirium and weakness  - PT/OT  - Speech to assess swallowing when able  - out of bed and up to chair today  - lights on during day, allow sleep at night    Acute renal failure  - on HD via RIJ temp HD line, management per nephro  - no urine output noted on bladder scan    Shock liver  - in setting of hemorrhagic shock, now improving    Enlarged gallbladder  - as noted on CT abd and bedside US, improved on most recent check  - possible source of infection should sepsis re-occur    Coagulopathy  - in setting of critical illness and recent bleed  - stable for now    DVT: SQ heparin  Diet: speech eval today for PO clearance    Patient seen and discussed with Dr. Kan.    Randell Whittington MD  1/20/2023    Pt seen and examined with Pulmonary/Critical Care team. Critical Care time was spent validating the history and physical exam, reviewing the lab and imaging results, and discussing the care of the patient with the bedside nurse. The following additional comments are made:    No urine output yet.  BP and HR are good.  Gas exchange is better,.  Still has basilar atelectasis and bilateral effusions and ascites by POCUS. Will need 10 days - 2 wks of abx. Focus on mobility and rehab.  Mentation improving.    Critical Care time 35 minutes    Heriberto Kan MD  Phone  [phone]

## 2023-01-20 NOTE — PLAN OF CARE
Problem: SLP  Goal: SLP Goal  Description: Short Term Goals:  1. Pt will participate in swallow eval to determine safest diet level   2. Pt will tolerate clear liquid diet with no audible dysphagia signs.   Outcome: Ongoing, Progressing   Pt more cooperative and participatory in PO trials this AM. Pt to be advanced to clear liquid diet with assistance for feeding.

## 2023-01-20 NOTE — PT/OT/SLP PROGRESS
Occupational Therapy   Treatment    Name: Lily Green  MRN: [id number]  Admitting Diagnosis:  Peritonitis  4 Days Post-Op    Recommendations:     Discharge Recommendations: other (see comments) (post acute therapy)  Discharge Equipment Recommendations:  other (see comments) (defer dme)  Barriers to discharge:  Other (Comment) (ICU level management; limited activity tolerace; high level of assist)    Assessment:     Lily Green is a 72 y.o. female with a medical diagnosis of Peritonitis.  She presents with ..The primary encounter diagnosis was Peritonitis [K65.9 (ICD-10-CM)]. Diagnoses of Submucosal colonic lesion, Shock, Large bowel ischemia, ATN (acute tubular necrosis), HINA (acute kidney injury), Acute hypoxemic respiratory failure, and Elevated lactic acid level were also pertinent to this visit.  . Performance deficits affecting function are impaired balance, pain, impaired cardiopulmonary response to activity, decreased safety awareness, impaired functional mobility, decreased lower extremity function, decreased upper extremity function, impaired self care skills, impaired skin, impaired sensation, decreased coordination, impaired endurance, weakness.     Much improved participation and alertness in skilled OT treatment on this date.Anticipate need for post acute therapy upon medically stable for d/c.     Rehab Prognosis:  Good; patient would benefit from acute skilled OT services to address these deficits and reach maximum level of function.       Plan:     Patient to be seen 3 x/week to address the above listed problems via self-care/home management, therapeutic activities, therapeutic exercises, neuromuscular re-education  Plan of Care Expires: 02/15/23  Plan of Care Reviewed with: patient    Subjective     Pain/Comfort:  Pain Rating 1: other (see comments) (with prompting able to discern discomfort in Feet due to placement of feet touching cold surface of leg rests in cardiac chair;  "min discomfort in mouth)  Pain Rating Post-Intervention 1: other (see comments) (indicated relief in B feet after repositioned)    Objective:     Communicated with: nursing prior to session.  Patient found upright in cardiac chair with Other (comments) (ICU monitoring; telemetry; blood pressure cuff; peripheral IV; arterial line; NG tube) upon OT entry to room.    General Precautions: Standard, fall, respiratory    Orthopedic Precautions:N/A  Braces: N/A  Respiratory Status: Room air ; vitals monitored throughout session. SpO2 varying from 94%-86% when exerting self when talking requiring min cueing to facilitate relaxation.     Occupational Performance:     Activities of Daily Living:  Grooming: max assist; oral hygiene/swishing/rinsing; safety assist to avoid accidental pulling on NG tub; therapist controlled amount of paste on toothbrush      AMPAC 6 Click ADL: 10    Treatment & Education:  Patient upright in cardiac chair. Spouse present with reported gratitude due to shift in improved patient's status. Patient able to verbally greet therapist at entry by name. A&O x3 with increased time for processing and encouragement to use her voice. Volitionally and >80% of time following motor commands to move BUE demonstrating grossly 3+/5 MMT. Instructed on breathing, use of "brain breaks", and co-instruction for grooming/ADL as above.   Incorporated mindfulness/body scan instruction into session. Patient able to verbalize 8/10 body parts with sustained touch by therapist. Patient then able to verbalize localized discomfort in feet 2/2 neuropathy aggravated by cold temp of leg rests; in response, therapist initially placed soft sheet as barrier then trialed stacy pad which patient reporting relief.   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 Post therapeutic activity, noted improved self-awareness and self-efficacy as able to come up with positive affirmation of "I can get well!" with min prompting only. Able to verbalize localized discomfort in feet 2/2 neuropathy and decreased comfort of feet on cold temp leg rests (thus placement of barrier provided).    Patient left  up in cardiac chair  with all lines intact, call button in reach, nursing notified, and spouse present    Spouse thanked therapist. Patient waved and verbalized "bye".  GOALS:   Multidisciplinary Problems       Occupational Therapy Goals          Problem: Occupational Therapy    Goal Priority Disciplines Outcome Interventions   Occupational Therapy Goal     OT, PT/OT Ongoing, Progressing    Description: Goals to be met by: 2/15/2023     Patient will increase functional independence with ADLs by performing:    Feeding with min assist and incorporation of upright seated posture with incorporation of conservation techniques  UE Dressing with Moderate Assistance with easy on overhead clothing  Grooming while seated with Minimal Assistance.  Rolling bilaterally with moderate assist to increased ability to self-reposition to decrease pressure injury and to decrease burden of care during pericare  Sitting eob with min assist x8 minutes without adverse vital signs  Bed < > BSC transfer with moderate assist via least restrictive method  Increase BUE MMT grossly to 3+/5 to integrate dominant UE into daily living tasks                         Time Tracking:     OT Date of Treatment: 01/20/23  OT Start Time: 1113  OT Stop Time: 1140  OT Total Time (min): 27 min    Billable Minutes:Self Care/Home Management 13 min  Therapeutic Activity 14 min    OT/RICHARD: OT          1/20/2023  "

## 2023-01-20 NOTE — PT/OT/SLP PROGRESS
Speech Language Pathology Treatment    Patient Name:  Lily Green   MRN:  [id number]  Admitting Diagnosis: Peritonitis    Recommendations:                 General Recommendations:  ongoing swallow eval, monitor safety and advance diet as able   Diet recommendations:  CLEAR liquid diet, may crush oral meds as able   Aspiration Precautions:  upright for PO, daily and frequent oral care at least 3x/day, slow rate, alternate sips and bites, offer water   General Precautions: Standard, fall, respiratory  Communication strategies:  delayed responses, low voicing     Subjective     Pt seen in ICU, she is awake and seated in cardiac chair. Pt more cooperative for PO.   Patient goals: none stated      Pain/Comfort:  Pain Rating 1:  (grimaces but does not state any pain)    Respiratory Status: room air     Objective:     Has the patient been evaluated by SLP for swallowing?   Yes  Keep patient NPO? No   Current Respiratory Status:    room air     Swallowing: Pt seen in ICU, she is awake and more alert. Pt needed ongoing cues and verbal stimuli to stay engaged and answer basic questions. Pt with delayed responses and provided single word answers. Pt agreed to PO trials. Pt given ice chips, tsp of water, straw swallows of water, and applesauce by tsp. Pt tolerated liquids and pureed with slow oral motor control, fair ap transfer and minimal delay in swallow trigger was noted. No audible coughing, no wet voice and no changes in SPO2 levels.   Pt may advance to clear liquid diet, pt did grimace on PO trials but did not report any pain. Pt did voice that NGT was causing some difficulty with pushing down pureed textures.   Pt left upright in cardiac chair and physical therapy entered room to work with pt.   Notified RN and MD of diet recs.     Assessment:     Lily Green is a 72 y.o. female admitted with Peritonitis who presents with increased alertness level today and improved tolerance of PO, pt may start  clear liquid diet.   Surgery MD has orders to pull NGT this afternoon.     Goals:   Multidisciplinary Problems       SLP Goals          Problem: SLP    Goal Priority Disciplines Outcome   SLP Goal     SLP Ongoing, Progressing   Description: Short Term Goals:  1. Pt will participate in swallow eval to determine safest diet level   2. Pt will tolerate clear liquid diet with no audible dysphagia signs.                        Plan:     Patient to be seen:  3 x/week   Plan of Care expires:  02/17/23  Plan of Care reviewed with:  patient   SLP Follow-Up:  Yes       Discharge recommendations:  [facility] (long-term acute care hospital) (post acute)   Barriers to Discharge:  None    Time Tracking:     SLP Treatment Date:   01/20/23  Speech Start Time:  1000  Speech Stop Time:  1014     Speech Total Time (min):  14 min    Billable Minutes: Treatment Swallowing Dysfunction 14    01/20/2023

## 2023-01-20 NOTE — PROGRESS NOTES
"[facility] - Intensive Care  Adult Nutrition  Progress Note    SUMMARY       Recommendations    Recommendation:  1. Add gluten free restrictions to diet.   2. Encourage intake of CL diet as tolerated.   3. Continue TF until ot tolerating diet with at least 50% intake at meals.   4. Montior weight/labs.   5. RD to continue to follow to monitor diet tolerance    Goals:  TF + diet to meet at least 85% EEN/EPN by RD follow up  Nutrition Goal Status: new  Communication of RD Recs: reviewed with RN (Leo)    Assessment and Plan  * Peritonitis  Contributing Nutrition Diagnosis  Inadequate energy intake    Related to (etiology):   Dx, s/p surgery    Signs and Symptoms (as evidenced by):   Unable to consume po diet, NG tube placed     Interventions:  Collaboration with other providers    Nutrition Diagnosis Status:   Continues (CL diet just started)      Malnutrition Assessment  Unable to assess NFPE 2/2 pt working with therapy at visit      Reason for Assessment  Reason For Assessment: RD follow-up  Diagnosis:  (peritonitis)  Relevant Medical History: HTN, peptic ulcer, HLD, celiac disease, DM, appendectomy, hysterectomy  General Information Comments: Pt started on Clear Liquid diet today per SLP eval/recs. NG tube in place and receiving TF of Diabetisource AC at 60ml/hr. Tolerating per ANUPAM Bond 13- abd incision. Pt was working with therapy at visit-unable to assess NFPE. Noted weight stable x 6 months. s/p expl lap 1/4 and expl lap with colostomy closure/creation 1/16  Nutrition Discharge Planning: d/c needs to be determined    Nutrition Risk Screen  Nutrition Risk Screen: tube feeding or parenteral nutrition    Nutrition/Diet History  Food Preferences: gluten free  Spiritual, Cultural Beliefs, Zoroastrianism Practices, Values that Affect Care: no  Factors Affecting Nutritional Intake: altered gastrointestinal function    Anthropometrics  Temp: 97.8 °F (36.6 °C)  Height Method: Stated  Height: 5' 5" (165.1 cm)  Height " (inches): 65 in  Weight Method: Bed Scale  Weight: 59 kg (130 lb 1.1 oz)  Weight (lb): 130.07 lb  Ideal Body Weight (IBW), Female: 125 lb  % Ideal Body Weight, Female (lb): 104.06 %  BMI (Calculated): 21.6  BMI Grade: 18.5-24.9 - normal  Usual Body Weight (UBW), k.1 kg  % Usual Body Weight: 101.76  % Weight Change From Usual Weight: 1.55 %     Lab/Procedures/Meds  Pertinent Labs Reviewed: reviewed  Pertinent Labs Comments: Na 133L, BUN 53H, Crea 3.9H, Glu 231H, Ca 7.9L, Phos 2.1L, Alb 2.1L  Pertinent Medications Reviewed: reviewed  Pertinent Medications Comments: NaCl, heparin, insulin    Estimated/Assessed Needs  Weight Used For Calorie Calculations: 59 kg (130 lb 1.1 oz)  Energy Calorie Requirements (kcal): 1770 (30 kcal/kg)  Energy Need Method: Kcal/kg  Protein Requirements: 70g (1.2g/kg)  Weight Used For Protein Calculations: 59 kg (130 lb 1.1 oz)  Estimated Fluid Requirement Method: RDA Method  RDA Method (mL): 1770  CHO Requirement: 200g    Nutrition Prescription Ordered  Current Diet Order: Clear Liquid  Current Nutrition Support Formula Ordered: Diabetisource AC  Current Nutrition Support Rate Ordered: 60 (ml)  Current Nutrition Support Frequency Ordered: ml/hr    Evaluation of Received Nutrient/Fluid Intake  Enteral Calories (kcal): 1728  Enteral Protein (gm): 86  Enteral (Free Water) Fluid (mL): 1175  % Kcal Needs: 97  % Protein Needs: 122  I/O: 2723/4275  Energy Calories Required: meeting needs  Protein Required: meeting needs  Fluid Required: meeting needs  Comments: LBM 1/15  % Intake of Estimated Energy Needs: 75 - 100 %  % Meal Intake: 0 - 25 %    Nutrition Risk  Level of Risk/Frequency of Follow-up:  (2xweekly)     Monitor and Evaluation  Food and Nutrient Intake: enteral nutrition intake  Food and Nutrient Adminstration: diet order, enteral and parenteral nutrition administration  Physical Activity and Function: nutrition-related ADLs and IADLs  Anthropometric Measurements: weight  Biochemical  Data, Medical Tests and Procedures: electrolyte and renal panel  Nutrition-Focused Physical Findings: overall appearance     Nutrition Follow-Up    RD Follow-up?: Yes

## 2023-01-20 NOTE — PLAN OF CARE
The sw spoke to the pt and her spouse Deric in reference to [facility] and they are in agreement with the pt's info being sent to [facility]. Mylene with [facility] states she will come and speak with them. The sw faxed the pt's info to [facility] via [employer].        01/20/23 7764   Post-Acute Status   Post-Acute Authorization Placement   Post-Acute Placement Status Referrals Sent   Discharge Delays None known at this time   Discharge Plan   Discharge Plan A Long-term acute care facility (LTAC)   Discharge Plan B Rehab

## 2023-01-20 NOTE — PLAN OF CARE
Problem: Renal Function Impairment (Acute Kidney Injury/Impairment)  Goal: Effective Renal Function  Outcome: Ongoing, Progressing   - iHD today (2L removed), tolerated well    Problem: Oral Intake Inadequate (Acute Kidney Injury/Impairment)  Goal: Optimal Nutrition Intake  Outcome: Ongoing, Progressing   - Failed swallow eval   - Tube feeds infusing at goal rate of 60 mL/hr to NGT    Problem: Diabetes Comorbidity  Goal: Blood Glucose Level Within Targeted Range  Outcome: Ongoing, Progressing   - Monitoring blood glucose 4x/daily    Problem: Adult Inpatient Plan of Care  Goal: Optimal Comfort and Wellbeing  Outcome: Ongoing, Progressing   - Norco given x 1 as patient with grimacing/pain with movement    Problem: Adult Inpatient Plan of Care  Goal: Plan of Care Review  Outcome: Ongoing, Progressing  - Plan of care reviewed with patient and patient's spouse  - Delirium precautions initiated per order  - Avoid awakenings overnight  - PT/OT visited today

## 2023-01-20 NOTE — PROGRESS NOTES
[facility]  History & Physical  Family Medicine       Admit Date: 2023   LOS: 7 days     Chief Complaint/Reason for Admission: Shock    HPI     Patient is a 73 y/o female with PMHx of diabetes type 2 (treated with metformin), HTN, and celiac disease (following a strict diet) who presented to OSH after falling and injuring her face while getting out of bed. The patient reported experiencing nausea, vomiting, diarrhea, abdominal pain, and weakness starting the day before. She has had a weight loss of 60 pounds over an extended period of time, which may have been partially caused by her diet. The patient's review of systems was otherwise normal. Of note patient is currently taking Lyrica, which has recently been increased. On evaluation at bedside, patient feels good just with mild abdominal pain. No other symptoms. Denies LOC with falls, does not remember if she hit her head.     ED Course: Presented hypotensive and tachycardic. Labs showed elevated WBC of 26.16, Glucose 269, , LA 7, and Beta-hydroxybutyrate 0.6. She was acidotic with ph of 7.282 and AG 21. Rest of the labs unremarkable. CT ab/pel revealed distention of the colon with a large amount of stool and fluid, and a segment of the colon that appeared narrowed with possible thickening of the wall, strongly suspected to be a colonic mass. S/p enema and disimpaction. The patient was given fluids (3 L) and antibiotics and transferred for further evaluation and possible surgery       Interval History: Patient required repeat exploratory lap to stop a bleed. Patient had fever yesterday, responded to Tylenol, no nausea or vomiting. Elevated WBC this AM        PMHx  Past Medical History:   Diagnosis Date    Anxiety     Celiac disease 2018    Celiac disease     Depression     Diabetes mellitus     Family history of breast cancer in mother      at age 68    Hyperlipidemia     Hypertension     Meniere disease     Menopause      Peptic ulcer     Reflux esophagitis     Urinary tract infection     Vaginal infection     Vaginal Pap smear 04/07/2014    Pap/Hpv Negative         PSHx  Past Surgical History:   Procedure Laterality Date    APPENDECTOMY      BREAST SURGERY      Tags    CARPAL TUNNEL RELEASE Bilateral 09/2017    ,     CLOSURE, COLOSTOMY Left 1/16/2023    Procedure: CLOSURE, COLOSTOMY;  Surgeon: Manuel Javier MD;  Location: [facility] OR;  Service: General;  Laterality: Left;    COLONOSCOPY  04/2018    Normal  ( Juan A)     COLOSTOMY Right 1/16/2023    Procedure: CREATION, COLOSTOMY;  Surgeon: Manuel Javier MD;  Location: [facility] OR;  Service: General;  Laterality: Right;    DILATION AND CURETTAGE OF UTERUS  1986    DUPUYTREN CONTRACTURE RELEASE Bilateral 09/2017    HYSTERECTOMY  1987    BEAU w/ appy, no BSO     INJECTION OF NEUROLYTIC AGENT AROUND LUMBAR SYMPATHETIC NERVE N/A 1/6/2022    Procedure: BLOCK, LUMBAR SYMPATHETIC;  Surgeon: Souleymane Rizo Jr., MD;  Location: [facility] PAIN MGT;  Service: Pain Management;  Laterality: N/A;  no pacemaker   pt is diabetic     INJECTION OF NEUROLYTIC AGENT AROUND LUMBAR SYMPATHETIC NERVE N/A 8/25/2022    Procedure: BLOCK, LUMBAR SYMPATHETIC;  Surgeon: Souleymane Rizo Jr., MD;  Location: [facility]IN [facility]GT;  Service: Pain Management;  Laterality: N/A;  diabetic     LAPAROTOMY, EXPLORATORY N/A 1/14/2023    Procedure: LAPAROTOMY, EXPLORATORY;  Surgeon: Manuel Javier MD;  Location: [facility] OR;  Service: General;  Laterality: N/A;    LAPAROTOMY, EXPLORATORY N/A 1/16/2023    Procedure: LAPAROTOMY, EXPLORATORY;  Surgeon: Manuel Javier MD;  Location: [facility] OR;  Service: General;  Laterality: N/A;    SHOULDER SURGERY  2009 & 2010    right rotator cuff    TONSILLECTOMY      UPPER GASTROINTESTINAL ENDOSCOPY  04/2018       Family History  Family History   Problem Relation Age of Onset    Vision loss Father     Arthritis Father     Breast cancer Mother     Cancer Mother     Vision loss Mother      Hyperlipidemia Sister     Breast cancer Paternal Aunt     Cancer Paternal Aunt     Diabetes Paternal Grandfather     Vision loss Sister     Kidney disease Neg Hx        Social  Social History     Socioeconomic History    Marital status:    Tobacco Use    Smoking status: Never    Smokeless tobacco: Never   Substance and Sexual Activity    Alcohol use: No    Drug use: Never    Sexual activity: Not Currently     Partners: Male     Birth control/protection: See Surgical Hx     Comment: :      Social Determinants of Health     Financial Resource Strain: Low Risk     Difficulty of Paying Living Expenses: Not hard at all   Food Insecurity: No Food Insecurity    Worried About Running Out of Food in the Last Year: Never true    Ran Out of Food in the Last Year: Never true   Transportation Needs: No Transportation Needs    Lack of Transportation (Medical): No    Lack of Transportation (Non-Medical): No   Physical Activity: Sufficiently Active    Days of Exercise per Week: 7 days    Minutes of Exercise per Session: 50 min   Stress: Stress Concern Present    Feeling of Stress : Very much   Social Connections: Unknown    Frequency of Communication with Friends and Family: More than three times a week    Frequency of Social Gatherings with Friends and Family: More than three times a week    Active Member of Clubs or Organizations: Yes    Attends Club or Organization Meetings: More than 4 times per year    Marital Status:    Housing Stability: Low Risk     Unable to Pay for Housing in the Last Year: No    Number of Places Lived in the Last Year: 1    Unstable Housing in the Last Year: No         Allergies  Review of patient's allergies indicates:   Allergen Reactions    Crestor [rosuvastatin] Swelling    Gluten protein        ROS- As per HPI, otherwise negative  Review of Systems   Constitutional:  Negative for chills and fever.   HENT:  Positive for sore throat.    Eyes:  Negative for blurred vision.    Respiratory:  Negative for cough, shortness of breath and wheezing.    Cardiovascular:  Negative for chest pain and palpitations.   Gastrointestinal:  Positive for abdominal pain. Negative for nausea and vomiting.   Neurological:  Positive for weakness.   Psychiatric/Behavioral:  Positive for memory loss.      Objective  Vitals(Most Recent)      Temp: 98.3 °F (36.8 °C) (01/19/23 2300)  Pulse: 79 (01/20/23 0700)  Resp: 16 (01/20/23 0700)  BP: 137/67 (01/20/23 0700)  SpO2: 99 % (01/20/23 0700)           Vitals Range  Temp:  [32 °F (0 °C)-100.9 °F (38.3 °C)]   Pulse:  [75-97]   Resp:  [14-33]   BP: (109-157)/(58-75)   SpO2:  [94 %-100 %]   Arterial Line BP: (157-158)/(53)     I/O  I/O last 3 completed shifts:  In: 3373.7 [Other:250; NG/GT:2320; IV Piggyback:803.7]  Out: 4275 [Other:2500; Stool:1775]    Intake/Output Summary (Last 24 hours) at 1/20/2023 0751  Last data filed at 1/20/2023 0645  Gross per 24 hour   Intake 2723.73 ml   Output 4275 ml   Net -1551.27 ml           Physical Exam  Physical Exam  Constitutional:       General: She is not in acute distress.     Appearance: Normal appearance. She is normal weight.   HENT:      Head: Normocephalic and atraumatic.      Comments: Facial laceration on lip and ecchymosis of L eye     Mouth/Throat:      Mouth: Mucous membranes are moist.      Pharynx: Oropharynx is clear.   Eyes:      Extraocular Movements: Extraocular movements intact.      Conjunctiva/sclera: Conjunctivae normal.      Pupils: Pupils are equal, round, and reactive to light.   Cardiovascular:      Rate and Rhythm: Normal rate and regular rhythm.      Pulses: Normal pulses.      Heart sounds: Normal heart sounds.   Pulmonary:      Effort: Pulmonary effort is normal.      Breath sounds: Normal breath sounds.   Abdominal:      General: Abdomen is flat. There is no distension.      Palpations: Abdomen is soft.      Tenderness: There is generalized abdominal tenderness. There is guarding.    Musculoskeletal:      Cervical back: Normal range of motion.      Right lower leg: No edema.      Left lower leg: No edema.   Skin:     General: Skin is warm.      Capillary Refill: Capillary refill takes 2 to 3 seconds.   Neurological:      General: No focal deficit present.      Mental Status: She is oriented to person, place, and time. She is confused.      Motor: Weakness present.      Comments: Significant neurological improvement, conversant oriented and following all commands  Some delay to complex questions and short term recall           Labs  Recent Labs   Lab 01/20/23  0549   WBC 32.80*   RBC 3.40*   HGB 9.3*   HCT 27.6*   MCV 81*   MCH 27.4   MCHC 33.7        Recent Labs   Lab 01/20/23  0549   CALCIUM 7.9*   PROT 5.5*   *   K 4.7   CO2 24      BUN 53*   CREATININE 3.9*   ALKPHOS 131   ALT 1,031*   *   BILITOT 0.9          Lab Results   Component Value Date    INR 1.3 (H) 01/17/2023    INR 1.6 (H) 01/16/2023    INR 1.6 (H) 01/16/2023    APTT 33.2 (H) 01/17/2023       No results for input(s): TROPONINI, CPKMB, CPK in the last 72 hours.      A1c:   Lab Results   Component Value Date    HGBA1C 5.8 (H) 11/01/2022     , Last Gluc:   Recent Labs   Lab 01/18/23  1916 01/19/23  0352 01/19/23  0901 01/19/23  1131 01/19/23  1933 01/20/23  0738   POCTGLUCOSE 114* 204* 247* 152* 188* 262*         TSH:   Lab Results   Component Value Date    TSH 1.821 01/13/2023       UA  Urinalysis        Micro  Microbiology Results (last 7 days)       Procedure Component Value Units Date/Time    Blood culture [[id number]] Collected: 01/17/23 1927    Order Status: Completed Specimen: Blood Updated: 01/20/23 0612     Blood Culture, Routine No Growth to date      No Growth to date      No Growth to date    Blood culture [[id number]] Collected: 01/17/23 1927    Order Status: Completed Specimen: Blood Updated: 01/20/23 0612     Blood Culture, Routine No Growth to date      No Growth to date      No Growth to date     Blood culture [[id number]]     Order Status: Canceled Specimen: Blood     Clostridium difficile EIA [[id number]] Collected: 01/14/23 0241    Order Status: Completed Specimen: Stool Updated: 01/16/23 0954     C. diff Antigen Negative     C difficile Toxins A+B, EIA Negative     Comment: Testing not recommended for children <24 months old.                ABG  Recent Labs   Lab 01/14/23  0842 01/14/23  1207 01/16/23  1615   PH 7.142* 7.304* 7.319*   PCO2 49.2* 26.1* 30.7*   PO2 160* 95 74*   HCO3 16.8* 12.9* 15.8*   POCSATURATED 99 97 94*   BE -12 -13 -10         Imaging           Assessment/Plan:  Lily Green is a 72 y.o. female patient w/ PMHx of diabetes type 2 (treated with metformin), HTN, and celiac disease (following a strict diet)     Here with   1. Shock  2. ischemic bowel/ acute abdomen     Neuro/Psych  #AMS, Acute illness delirium  Appears lethargic, confused, but does answer questions appropriately  H/o of frequent falls  Home Lyrica recently increased.  CTH unremarkable  Minimally responsive on interview  Hypovolemic shock and surgery on 1/17/23  CT head for weakness in the right arm  CT head negative  Plan  - Continue to monitor  No night time vitals or labs  - Encourage regular sleep pattern      CV  #Shock  Poss septic, LA 7 > 6.8  Responsive to fluids  - Pressors held  - Will continue abx  - IV pressor support prn   - F/u echo  - Trend lactate  - DC       # Anemia  H/H dropped from 10 to 6.2 overnight  Down from 17 on admit  Initial factors dilution, and blood loss from colectomy 1/4/23  New drop of unknown cause  Bleed stopped in OR  H/H 10.1  PTINR 13.6/1.3  Plan    Follow up coagulopathy work up    Monitor for signs of bleeding    Pulm  On RA no issues  O2 sat 100 on 4L NC     FEN/GI  #Ischemic bowel, secondary to Toxic Sheldon colon  #Acute Abdomen  Although abdomen non concerning, imaging concerning  Also elevated LA concerning for ischemic bowel  Amylase greatly elevated pointing to  ischemia  CT ab/pel revealing distention of the colon with a large amount of stool and fluid, and a segment of the colon that appeared narrowed with possible thickening of the wall, strongly suspected to be a colonic mass  Surgery s/p colon resection revealed no evidence of mass  No evidence of prior CV disease   Etiology unknown  Bladder pressure wnl  ABG ph 7.319 PCO2 30.7, O2 74 Bicarb 15.8  Gen surg consulted  - S/p repeat ex lap for bleed  - Diet: Tube feeds, Diabetisource titrating to 60ml/hr  - Keep Mg 2, K 4  - Zofran prn for n/v   - F/u Gen surg recs  - maintain euvolemia  - Repeat swallow  - Restart Diet, gluten free  Shock Liver      Monitor AST/ALT  Repeat US to assess for cholecystitis  AST/ALT improved to 1095/1259    #Distended Gallbladder  Visualized on CT and US  Not showing prominent signs of infection on initial imaging  Bilirubin WNL         RENAL  #HINA  Likely 2/2 to shock  BUN/Cr: 58/1.7 > 60/1.9  UOP: minimal despite aggressive IVF  - HD on today 1/19/23  -  in 24 hr  -Worsened BUN 62/4.1  -Phos 8.5    Plan  Strict I&Os  Avoid renal toxic meds   Continue to monitor renal status and urine output   Repeat HD 1/16/23    dc br[place]ford  Bladder scans    Heme  Status post bowel resection  H/H down from 17.2/50.7 on admit  10/27.9  WBC increased 13 >18 monitor  PT/INR 16.4/1.6  DVT prophylaxis: lovenox      # Anemia  H/H dropped from 10 to 6.2 overnight  Down from 17 on admit  Initial factors dilution, and blood loss from colectomy 1/4/23  New drop of unknown cause  No free fluid visualized on Bedside abd US  Plan    H/H down to 8.6/9.0     Possible dilution in setting of increased volume    Monitor signs of bleeding, bruising, bloody ostomy output    Follow up coagulopathy work up            Endo  #possible starvation ketoacidosis  #T2DM   Elevated Bgs, AG, and slight B-hydrox  - Supportive management  - Maintain glucose 140-180  - IVF prn  - SSI and long acting  - BG elevated in Am  -SSI changed  to moderate scale  - Will consider changing LA to BID dosing  - F/u BMP      ID  Gram negative Bacteremia  - Gram negative rods reported in Blood culture  - Continue Cefepime, flagyl for 10-14 day course  - Blood culture ordered  -  All cultures NGT   - Fever overnight, CBC collected  - WBC 32.80   - No tylenol    DVT PPx: SCD,heparin  Diet: tube feeds  GI PPX: None  Deconditioning: PT/OT  Code Status: Full        Dispo  - Continue ICU Care      1/20/2023 Bal Burr M.D., [facility]  [facility]

## 2023-01-20 NOTE — PROGRESS NOTES
[facility] Progress Note    Subjective:      Ms. Green is feeling much better today. More awake and alert and conversant. Tolerated HD well yesterday. Remains anuric.      Objective:   Last 24 Hour Vital Signs:  BP  Min: 109/58  Max: 157/73  Temp  Av.3 °F (32.9 °C)  Min: 32 °F (0 °C)  Max: 100.9 °F (38.3 °C)  Pulse  Av.9  Min: 75  Max: 97  Resp  Av.8  Min: 14  Max: 33  SpO2  Av.9 %  Min: 94 %  Max: 100 %  Weight  Av kg (130 lb 1.1 oz)  Min: 59 kg (130 lb 1.1 oz)  Max: 59 kg (130 lb 1.1 oz)  I/O last 3 completed shifts:  In: 3373.7 [Other:250; NG/GT:2320; IV Piggyback:803.7]  Out: 4275 [Other:2500; Stool:1775]    Physical Examination:  Gen: awake, not conversant   HEENT: normocephalic, EOMI, MMM  CV: RRR, no murmurs   Lungs: CTAB, symmetric chest rise  GI: Soft, tenderness resolving   Extrem: no edema of LE or UE  Skin: dry, warm, intact.       Laboratory:  Recent Labs   Lab 23  0323 23  0816 23  0310 23  0348 23  0549   WBC 18.05*  --  19.60* 21.23* 32.80*   HGB 9.8*  9.8*   < > 9.0* 8.6* 9.3*   HCT 26.6*  --  24.7* 25.0* 27.6*   *  --  111* 102*  --    *  132*  --  134* 131* 133*   K 4.8  4.8  --  3.8 4.1 4.7     101  --  102 100 100   CREATININE 2.9*  2.9*  --  2.8* 4.5* 3.9*   BUN 50*  50*  --  33* 58* 53*   CO2 20*  20*  --  23 22* 24   ALT 1,434*  --  1,339* 1,259* 1,031*   AST 2,049*  --  1,443* 1,095* 512*    < > = values in this interval not displayed.       Radiology:  Imaging has been reviewed personally.       Assessment and Plan:     Lily Green is a 72 y.o. woman with a history of celiac disease, DM2 well controlled, HTN, anxiety presented after having a fall. Noted to have severe lactic acidosis with imaging concerning for ischemic colitis. Became hypotensive and started on levophed and vasopressin for shock. On arrival to [place] taken to OR for colectomy on . UOP severely diminished and essentially  became anuric with only 50ml UOP. [facility] consulted for severe metabolic acidosis with pH 7.14 and HINA in setting of septic shock and ischemic colitis. HD initiated 1/14 and had one session with improvement in acidosis. Developed hemorrhagic shock with bleeding into the abdomen 1/16 and taken back to OR for exlap and ileostomy creation. CRRT initiated 1/16, now off pressors and back on iHD.      Stage III Anuric HINA  - baseline creatinine 0.8  - presenting in shock on pressors with ischemic colitis.  s/p colectomy 1/14 but still with refractory acidosis and minimal UOP after. Another insult on 1/16 after hemorrhagic shock  - ATN with 2 separate insults will likely take several weeks to months to recover   - HD initiated 1/14; CRRT initiated 1/16 and discontinued 1/17; iHD resumed 1/17  - remains anuric  - HD yesterday tolerated well; tentatively plan for HD again tomorrow   - renally dose all medications      Anion Gap Metabolic Acidosis  - resolving with HD     Hyponatremia  - due to hypervolemia, resolving with HD     Hyperphosphatemia  - phos low after HD now, today 2.1. No need for binder. On diabetosource tube feeds; does not need renal diet      Hypocalcemia  - Ca corrects for hypoalbuminemia       Thank you for allowing us to participate in the care of this patient. Please call with any questions.       Elizabeth Mobley, DO  [facility]

## 2023-01-20 NOTE — NURSING
Notified Dr. Miramontes about patient having two massive liquid bowel movements into ostomy bag within 2/12-3 hrs. Total of 1500 ml. Said to continue 60 ml/hr rate tube feeds for now.

## 2023-01-20 NOTE — PROGRESS NOTES
Progress Note  General Surgery    Admit Date: 1/13/2023  S/P: Procedure(s) (LRB):  LAPAROTOMY, EXPLORATORY (N/A)  CLOSURE, COLOSTOMY (Left)  CREATION, COLOSTOMY (Right)    Post-operative Day: 4 Days Post-Op    Hospital Day: 8    SUBJECTIVE:     No acute events overnight. Much more awake and conversant.  Tolerating tube feeds, ostomy with output.  WBC up again but afebrile.  OBJECTIVE:     Vital Signs (Most Recent)  Temp: 98.3 °F (36.8 °C) (01/19/23 2300)  Pulse: 79 (01/20/23 0700)  Resp: 16 (01/20/23 0700)  BP: 137/67 (01/20/23 0700)  SpO2: 99 % (01/20/23 0700)    Vital Signs Range (Last 24H):  Temp:  [32 °F (0 °C)-100.9 °F (38.3 °C)]   Pulse:  [75-97]   Resp:  [14-33]   BP: (109-157)/(58-75)   SpO2:  [94 %-100 %]   Arterial Line BP: (158)/(53)     I & O (Last 24H):  Intake/Output Summary (Last 24 hours) at 1/20/2023 0806  Last data filed at 1/20/2023 0645  Gross per 24 hour   Intake 2663.73 ml   Output 4275 ml   Net -1611.27 ml       Physical Exam:  Gen: NAD   HEENT: NCAT  Pulm: unlabored, symmetrical   Abd: Soft, nttp. No rebound, guarding.     Wound/Incision:  clean, dry, intact    Laboratory:  Labs within the past 24 hours have been reviewed.    ASSESSMENT/PLAN:     Assessment/Plan:  Ok to DC NGT  Doubt intra-abdominal source for leukocytosis   Will take down dressings later today to make sure incisions look ok    Manuel Javier M.D., F.A.C.S.  [id number] and General Surgery  [facility]

## 2023-01-20 NOTE — PLAN OF CARE
Problem: Physical Therapy  Goal: Physical Therapy Goal  Description: Goals to be met by: 2023     Patient will increase functional independence with mobility by performin. Supine to sit with Maximal Assistance  2. Rolling with Moderate Assistance.  3. Sitting at edge of bed x10 minutes with Minimal Assistance and Moderate Assistance  4. Increased functional strength to at least 3 for transfers and bed mobility-met 23  Amend goals as appropriate    Outcome: Ongoing, Progressing   Patient found sitting upright in stretcher chair; much more alert, following all one and two step commands, able to make needs known but needs reminders to use her voice; pt able to clean her mouth/teeth using 4 moistened dental swabs; pt performed BLE AROM ex incoordination with PLB and rest breaks between; pt leaned forward from back of chair with Marian to sit erect- pt able to hold~10 sec before needing to rest back; continued with PLB to recover; pt fatigued and requested to rest; BLE strength increased to ~4- to 4 grossly; cont with POC.

## 2023-01-21 LAB
ALBUMIN SERPL BCP-MCNC: 2 G/DL (ref 3.5–5.2)
ALP SERPL-CCNC: 113 U/L (ref 55–135)
ALT SERPL W/O P-5'-P-CCNC: 720 U/L (ref 10–44)
ANION GAP SERPL CALC-SCNC: 8 MMOL/L (ref 8–16)
ANISOCYTOSIS BLD QL SMEAR: SLIGHT
AST SERPL-CCNC: 233 U/L (ref 10–40)
BASO STIPL BLD QL SMEAR: ABNORMAL
BASOPHILS NFR BLD: 0 % (ref 0–1.9)
BILIRUB SERPL-MCNC: 0.9 MG/DL (ref 0.1–1)
BUN SERPL-MCNC: 72 MG/DL (ref 8–23)
BURR CELLS BLD QL SMEAR: ABNORMAL
CALCIUM SERPL-MCNC: 7.7 MG/DL (ref 8.7–10.5)
CHLORIDE SERPL-SCNC: 97 MMOL/L (ref 95–110)
CO2 SERPL-SCNC: 22 MMOL/L (ref 23–29)
CREAT SERPL-MCNC: 4.7 MG/DL (ref 0.5–1.4)
DACRYOCYTES BLD QL SMEAR: ABNORMAL
DIFFERENTIAL METHOD: ABNORMAL
EOSINOPHIL NFR BLD: 0 % (ref 0–8)
ERYTHROCYTE [DISTWIDTH] IN BLOOD BY AUTOMATED COUNT: 19.5 % (ref 11.5–14.5)
EST. GFR  (NO RACE VARIABLE): 9 ML/MIN/1.73 M^2
GIANT PLATELETS BLD QL SMEAR: PRESENT
GLUCOSE SERPL-MCNC: 75 MG/DL (ref 70–110)
HCT VFR BLD AUTO: 25.5 % (ref 37–48.5)
HGB BLD-MCNC: 8.8 G/DL (ref 12–16)
IMM GRANULOCYTES # BLD AUTO: ABNORMAL K/UL
IMM GRANULOCYTES NFR BLD AUTO: ABNORMAL %
LYMPHOCYTES NFR BLD: 6 % (ref 18–48)
MAGNESIUM SERPL-MCNC: 2.1 MG/DL (ref 1.6–2.6)
MCH RBC QN AUTO: 28.3 PG (ref 27–31)
MCHC RBC AUTO-ENTMCNC: 34.5 G/DL (ref 32–36)
MCV RBC AUTO: 82 FL (ref 82–98)
MONOCYTES NFR BLD: 2 % (ref 4–15)
NEUTROPHILS NFR BLD: 91 % (ref 38–73)
NEUTS BAND NFR BLD MANUAL: 1 %
NRBC BLD-RTO: 0 /100 WBC
OVALOCYTES BLD QL SMEAR: ABNORMAL
PHOSPHATE SERPL-MCNC: 2.5 MG/DL (ref 2.7–4.5)
PLATELET # BLD AUTO: 172 K/UL (ref 150–450)
PLATELET BLD QL SMEAR: ABNORMAL
PMV BLD AUTO: 10.2 FL (ref 9.2–12.9)
POCT GLUCOSE: 105 MG/DL (ref 70–110)
POCT GLUCOSE: 105 MG/DL (ref 70–110)
POCT GLUCOSE: 135 MG/DL (ref 70–110)
POCT GLUCOSE: 164 MG/DL (ref 70–110)
POCT GLUCOSE: 54 MG/DL (ref 70–110)
POCT GLUCOSE: 63 MG/DL (ref 70–110)
POCT GLUCOSE: 66 MG/DL (ref 70–110)
POCT GLUCOSE: 74 MG/DL (ref 70–110)
POIKILOCYTOSIS BLD QL SMEAR: SLIGHT
POLYCHROMASIA BLD QL SMEAR: ABNORMAL
POTASSIUM SERPL-SCNC: 4.9 MMOL/L (ref 3.5–5.1)
PROT SERPL-MCNC: 5.4 G/DL (ref 6–8.4)
RBC # BLD AUTO: 3.11 M/UL (ref 4–5.4)
SODIUM SERPL-SCNC: 127 MMOL/L (ref 136–145)
WBC # BLD AUTO: 45.98 K/UL (ref 3.9–12.7)

## 2023-01-21 PROCEDURE — 25500020 PHARM REV CODE 255: Performed by: FAMILY MEDICINE

## 2023-01-21 PROCEDURE — 25000003 PHARM REV CODE 250: Performed by: STUDENT IN AN ORGANIZED HEALTH CARE EDUCATION/TRAINING PROGRAM

## 2023-01-21 PROCEDURE — 51798 US URINE CAPACITY MEASURE: CPT

## 2023-01-21 PROCEDURE — 80053 COMPREHEN METABOLIC PANEL: CPT | Performed by: STUDENT IN AN ORGANIZED HEALTH CARE EDUCATION/TRAINING PROGRAM

## 2023-01-21 PROCEDURE — 25000003 PHARM REV CODE 250: Performed by: SURGERY

## 2023-01-21 PROCEDURE — 99024 PR POST-OP FOLLOW-UP VISIT: ICD-10-PCS | Mod: ,,, | Performed by: STUDENT IN AN ORGANIZED HEALTH CARE EDUCATION/TRAINING PROGRAM

## 2023-01-21 PROCEDURE — S0030 INJECTION, METRONIDAZOLE: HCPCS

## 2023-01-21 PROCEDURE — 25000003 PHARM REV CODE 250: Performed by: INTERNAL MEDICINE

## 2023-01-21 PROCEDURE — 85027 COMPLETE CBC AUTOMATED: CPT | Performed by: FAMILY MEDICINE

## 2023-01-21 PROCEDURE — 99024 POSTOP FOLLOW-UP VISIT: CPT | Mod: ,,, | Performed by: STUDENT IN AN ORGANIZED HEALTH CARE EDUCATION/TRAINING PROGRAM

## 2023-01-21 PROCEDURE — 94799 UNLISTED PULMONARY SVC/PX: CPT

## 2023-01-21 PROCEDURE — 83735 ASSAY OF MAGNESIUM: CPT | Performed by: STUDENT IN AN ORGANIZED HEALTH CARE EDUCATION/TRAINING PROGRAM

## 2023-01-21 PROCEDURE — 84100 ASSAY OF PHOSPHORUS: CPT | Performed by: STUDENT IN AN ORGANIZED HEALTH CARE EDUCATION/TRAINING PROGRAM

## 2023-01-21 PROCEDURE — 80100014 HC HEMODIALYSIS 1:1

## 2023-01-21 PROCEDURE — 63600175 PHARM REV CODE 636 W HCPCS: Performed by: STUDENT IN AN ORGANIZED HEALTH CARE EDUCATION/TRAINING PROGRAM

## 2023-01-21 PROCEDURE — 97530 THERAPEUTIC ACTIVITIES: CPT | Mod: CQ

## 2023-01-21 PROCEDURE — 25000003 PHARM REV CODE 250

## 2023-01-21 PROCEDURE — 11000001 HC ACUTE MED/SURG PRIVATE ROOM

## 2023-01-21 PROCEDURE — S0030 INJECTION, METRONIDAZOLE: HCPCS | Performed by: SURGERY

## 2023-01-21 PROCEDURE — 99900035 HC TECH TIME PER 15 MIN (STAT)

## 2023-01-21 PROCEDURE — 85007 BL SMEAR W/DIFF WBC COUNT: CPT | Performed by: FAMILY MEDICINE

## 2023-01-21 PROCEDURE — 94761 N-INVAS EAR/PLS OXIMETRY MLT: CPT

## 2023-01-21 PROCEDURE — 63600175 PHARM REV CODE 636 W HCPCS: Performed by: INTERNAL MEDICINE

## 2023-01-21 RX ORDER — METRONIDAZOLE 500 MG/100ML
500 INJECTION, SOLUTION INTRAVENOUS
Status: DISCONTINUED | OUTPATIENT
Start: 2023-01-21 | End: 2023-01-26 | Stop reason: HOSPADM

## 2023-01-21 RX ADMIN — HEPARIN SODIUM 5000 UNITS: 5000 INJECTION INTRAVENOUS; SUBCUTANEOUS at 01:01

## 2023-01-21 RX ADMIN — IOHEXOL 75 ML: 350 INJECTION, SOLUTION INTRAVENOUS at 04:01

## 2023-01-21 RX ADMIN — Medication 4 G: at 08:01

## 2023-01-21 RX ADMIN — HEPARIN SODIUM 5000 UNITS: 5000 INJECTION INTRAVENOUS; SUBCUTANEOUS at 06:01

## 2023-01-21 RX ADMIN — METRONIDAZOLE 500 MG: 5 INJECTION, SOLUTION INTRAVENOUS at 01:01

## 2023-01-21 RX ADMIN — INSULIN ASPART 1 UNITS: 100 INJECTION, SOLUTION INTRAVENOUS; SUBCUTANEOUS at 11:01

## 2023-01-21 RX ADMIN — CEFEPIME 2 G: 2 INJECTION, POWDER, FOR SOLUTION INTRAVENOUS at 12:01

## 2023-01-21 RX ADMIN — METRONIDAZOLE 500 MG: 5 INJECTION, SOLUTION INTRAVENOUS at 08:01

## 2023-01-21 RX ADMIN — ACETAMINOPHEN 650 MG: 325 TABLET ORAL at 07:01

## 2023-01-21 RX ADMIN — IOHEXOL 30 ML: 350 INJECTION, SOLUTION INTRAVENOUS at 04:01

## 2023-01-21 RX ADMIN — HEPARIN SODIUM 5000 UNITS: 5000 INJECTION INTRAVENOUS; SUBCUTANEOUS at 10:01

## 2023-01-21 RX ADMIN — METRONIDAZOLE 500 MG: 5 INJECTION, SOLUTION INTRAVENOUS at 04:01

## 2023-01-21 NOTE — PROGRESS NOTES
Progress Note  [facility]    Attending: Dr. Kan  Admit Date: 1/13/2023  Today's Date: 01/21/2023    SUBJECTIVE:     Patient seen and examined this morning. Clinically much improved. Pt AAOx3 (answered name, [facility], January). Pt alert and conversant, following commands. Abdominal tenderness improved on exam.     Review of Systems   Constitutional:  Negative for chills and fever.   Eyes:  Negative for blurred vision.   Respiratory:  Negative for cough, shortness of breath and wheezing.    Cardiovascular:  Negative for chest pain and palpitations.   Gastrointestinal:  Positive for abdominal pain. Negative for nausea and vomiting.   Neurological:  Positive for weakness.   Psychiatric/Behavioral:  Positive for memory loss.      OBJECTIVE:     Vital Signs Trends/Hx Reviewed  Vitals:    01/21/23 0600 01/21/23 0615 01/21/23 0630 01/21/23 0645   BP: 133/65      Pulse: 80 85 80 81   Resp: (!) 35 (!) 28 (!) 32 (!) 33   Temp:       TempSrc:       SpO2: 97% 99% 97% 99%   Weight: 59 kg (130 lb 1.1 oz)      Height:            Physical Exam  Constitutional:       General: She is not in acute distress.     Appearance: Normal appearance. She is normal weight.   HENT:      Head: Normocephalic and atraumatic.      Comments: Facial laceration on lip and ecchymosis of L eye     Mouth/Throat:      Mouth: Mucous membranes are moist.      Pharynx: Oropharynx is clear.   Eyes:      Extraocular Movements: Extraocular movements intact.      Conjunctiva/sclera: Conjunctivae normal.      Pupils: Pupils are equal, round, and reactive to light.   Cardiovascular:      Rate and Rhythm: Normal rate and regular rhythm.      Pulses: Normal pulses.      Heart sounds: Normal heart sounds.   Pulmonary:      Effort: Pulmonary effort is normal.      Breath sounds: Normal breath sounds.   Abdominal:      General: Abdomen is flat. There is no distension.      Palpations: Abdomen is soft.      Tenderness: There is  generalized abdominal tenderness. There is no guarding.   Musculoskeletal:      Cervical back: Normal range of motion.      Right lower leg: No edema.      Left lower leg: No edema.   Skin:     General: Skin is warm.      Capillary Refill: Capillary refill takes 2 to 3 seconds.      Comments: Incision site clean dry and intact   Neurological:      Mental Status: She is confused.      Motor: Weakness present.       Laboratory:  Recent Labs   Lab 01/21/23  0436   WBC 45.98*   RBC 3.11*   HGB 8.8*   HCT 25.5*      MCV 82   MCH 28.3   MCHC 34.5     Recent Labs   Lab 01/21/23  0436   *   K 4.9   CL 97   CO2 22*   BUN 72*   CREATININE 4.7*   MG 2.1     No results for input(s): PH, PCO2, PO2, HCO3, POCSATURATED, BE in the last 24 hours.      Chest Imaging:   No new chest imaging.     ASSESSMENT & RECOMMENDATIONS     Lily Green is a 72 y.o. female with Celiac's Disease, GERD, DDD, HTN, MARIXA/MDD who presented after a fall after 3-5 days of fatigue, weakness, and lethargy. Found to have dilated colon, taken for ex lap and eventually total colectomy on 1/14/23. Currently in ICU for shock and ischemic bowel/acute abdomen, s/p repeat ex lap on 1/16/23. Now with  ICU acquired weakness, which is improving.     Neuro/Psych  Acute Encephalopathy  ICU Acquired Weakness  Worsening mental status noted 1/16/23; some improvement 1/17 as pt more alert and responsive.  Concern for new onset RUE weakness noted by PT/OT --> CT head ordered 1/18: negative  Continue to monitor  Delirium precautions - do not wake pt / no labs/vitals/meds overnight  Will need long rehab  Continue PT/OT     Mild TBI  - Patient fell at home and hit her head, was intermittently confused at first, although may have been due to metabolic encephalopathy  - Monitor  - Initial CTH was unremarkable     Degenerative Disc Disease  - Hold Lyrica     CV  Shock   - suspected 2/2 septic shock from ischemic bowel  - [place] II score 35% on 1/16/23  -  Was initially on 2 pressors, weaned off 1/15 am --> Levophed restarted on 1/16 --> now not requiring vasopressors  - Continue metronidazole and cefepime  - Echo results - EF 35%, Grade I LV diastolic dysfunction, mild AR, mild MR, mild TR, no pulm htn.  - CTAP: Active extravasation of arterial contrast in LUQ in keeping with rapid active hemorrhage. Large volume hemoperitoneum throughout abdomen and pelvis.  - Hemorrhagic shock now resolved     Pulm  Acute Hypoxemic Respiratory Failure  - Resolved, SpO2 % on RA      FEN/GI  F: None  E: Na 127 / K 4.9 / Cl 97 / bicarb 22  N: clear liquids, gluten free, increased tube feeds --> passed swallow eval --> full liquid diet     Acute Pancreatitis  Concerns for Acute Abdomen from Colonic Dilatation/Obstruction  Toxic megacolon  - S/P total colectomy with end ileostomy on 1/14/22  - C diff negative  - Elevated lactate at 1,278; concern for ischemic bowel  - General surgery consulted; repeat ex lap done on 1/16 for large bowel ischemia     Transaminitis; improving  - Suspected 2/2 shock + congestion + acute illness  - Shock liver  - AST 1,443 --> 1,095 --> 233  - ALT 1,339 --> 1,259 --> 720  - Monitor, maintain good hemodynamics     RENAL  I/O last 3 completed shifts:  In: 2168.1 [P.O.:240; NG/GT:1360; IV Piggyback:568.1]  Out: 2650 [Stool:2650]  I/O this shift:  In: 1250 [P.O.:600; Other:500; IV Piggyback:150]  Out: 2865 [Other:2500; Stool:365]      Intake/Output Summary (Last 24 hours) at 1/21/2023 1310  Last data filed at 1/21/2023 1234  Gross per 24 hour   Intake 2338.45 ml   Output 3740 ml   Net -1401.55 ml       ATN/HINA  Lactic Acidosis  Metabolic Acidosis  - Cr baseline 0.4 - 0.6  s/p bradford placement, no evidence of obstruction  - Suspect ATN from shock  - Trialysis placed and s/p HD on 1/14/23  - CRRT running 1/17 AM  - Continue to monitor, nephrology following; switched to HD on 1/17  - Avoid nephrotoxins, strict I/O  - BUN/Cr: 72/4.7 (58/4.5) (33/2.8) (50/2.9)  (59/3.5) (39/2.4)  - June d/c  - Follow up UOP and bladder scan  - HD as needed as per nephro     Heme  Anemia  - H/H 8.8/25 (8.6/25) (9/24) (10/26)  - Previously Hb 6.2 from 10 overnight on 1/16  - Received 2 units PRBC  - Massive transfusion protcol initiated; transfused another 2 units PRBC 1/16  - 2 units FFP transfused  - Plan for DVT US  - Fibrinogen 396 (upper normal)  - Continue to monitor  - Space out labs to morning labs with HD       Endo  #T2DM  - Glucose goal 140-180  - SSI + Accuchecks for now  - TSH wnl     ID  Septic shock  Gram negative bacteremia  - Possibly 2/2 toxic megacolon although etiology remains a little unclear (ischemic, IBD, colon CA, CMV) --> large bowel ischemia  - C diff negative  - Oral vanc d/c 1/16  - On metronidazole and cefepime; continue for 2 weeks total  - Bcx NGTD x2  - WBC 45 (32); possible leukemoid reaction but need to consider abscess formation, plan to image on Monday 1/23/23 with CTAP to assess         DVT PPx: Heparin started 1/18  Diet: Clear liquids, gluten free, tube feeds at 20 mL/hr --> full liquid diet  GI PPX: N/A  Deconditioning: PT/OT when stable  Code Status: Full        Dispo  Pt clinically much improved, okay to step down to floor today.     Thank you for allowing us to participate in the care of this patient. We will continue to follow. Please call with questions.     Pt seen and discussed with Pulm/Crit Care attending physician Dr. Kan.      Whitney Barksdale MD, MPH  [facility] PGY-1  [facility]   01/21/2023     Pt seen and examined with Pulmonary/Critical Care team and this note reviewed and validated with the following additional comments:    Continues to make slow but steady progress.  Now eating. Higher executive functioning still impaired.  No urine output.  Should be stable to step down.    Heriberto Kan MD  Phone [phone]

## 2023-01-21 NOTE — PROGRESS NOTES
[facility] - Intensive Care  General Surgery  Progress Note    Subjective:     History of Present Illness:  No notes on file    Post-Op Info:  Procedure(s) (LRB):  LAPAROTOMY, EXPLORATORY (N/A)  CLOSURE, COLOSTOMY (Left)  CREATION, COLOSTOMY (Right)   5 Days Post-Op     Interval History:   Patient seen and examined.  NAEON.  HD today. Tolerated sips of CLD.  Ostomy productive.  Pain controlled.      Medications:  Continuous Infusions:  Scheduled Meds:   sodium chloride 0.9%   Intravenous Once    ceFEPime (MAXIPIME) IVPB  2 g Intravenous Q24H    heparin (porcine)  5,000 Units Subcutaneous Q8H    metronidazole  500 mg Intravenous Q8H     PRN Meds:acetaminophen, dextrose 10%, dextrose 10%, dextrose 10%, dicyclomine, glucagon (human recombinant), glucose, glucose, heparin (porcine), hydrALAZINE, insulin aspart U-100, LIDOcaine, melatonin, ondansetron, promethazine, sodium chloride 0.9%, sodium chloride 0.9%     Review of patient's allergies indicates:   Allergen Reactions    Crestor [rosuvastatin] Swelling    Gluten protein      Objective:     Vital Signs (Most Recent):  Temp: 99.1 °F (37.3 °C) (01/21/23 1130)  Pulse: 88 (01/21/23 1130)  Resp: (!) 29 (01/21/23 1130)  BP: 135/68 (01/21/23 1130)  SpO2: (!) 94 % (01/21/23 1130)   Vital Signs (24h Range):  Temp:  [97.8 °F (36.6 °C)-100 °F (37.8 °C)] 99.1 °F (37.3 °C)  Pulse:  [69-93] 88  Resp:  [16-36] 29  SpO2:  [93 %-100 %] 94 %  BP: (121-160)/(63-78) 135/68     Weight: 59 kg (130 lb 1.1 oz)  Body mass index is 21.64 kg/m².    Intake/Output - Last 3 Shifts         01/19 0700  01/20 0659 01/20 0700 01/21 0659 01/21 0700 01/22 0659    P.O.  240 600    Other 250      NG/GT 1730 600     IV Piggyback 803.7 468.5 100    Total Intake(mL/kg) 2783.7 (47.2) 1308.5 (22.2) 700 (11.9)    Other 2500      Stool 1775 875     Total Output 4275 875     Net -1491.3 +433.5 +700                   Physical Exam  Constitutional:       General: She is not in acute distress.     Appearance:  She is not ill-appearing or toxic-appearing.   Cardiovascular:      Rate and Rhythm: Normal rate and regular rhythm.   Pulmonary:      Effort: No respiratory distress.      Comments: Tachypnea   Abdominal:      General: There is no distension.      Palpations: Abdomen is soft.      Tenderness: There is no abdominal tenderness. There is no guarding.      Hernia: No hernia is present.      Comments: Midline incision C/D/I with staples in place   LLQ incision C/D/I with staples in place   RLQ ostomy pink, patent, and viable with stool and air in the bag    Skin:     Capillary Refill: Capillary refill takes less than 2 seconds.   Neurological:      Mental Status: She is alert. Mental status is at baseline.       Significant Labs:  I have reviewed all pertinent lab results within the past 24 hours.  CBC:   Recent Labs   Lab 01/21/23 0436   WBC 45.98*   RBC 3.11*   HGB 8.8*   HCT 25.5*      MCV 82   MCH 28.3   MCHC 34.5     CMP:   Recent Labs   Lab 01/21/23 0436   GLU 75   CALCIUM 7.7*   ALBUMIN 2.0*   PROT 5.4*   *   K 4.9   CO2 22*   CL 97   BUN 72*   CREATININE 4.7*   ALKPHOS 113   *   *   BILITOT 0.9       Significant Diagnostics:  I have reviewed all pertinent imaging results/findings within the past 24 hours.    Assessment/Plan:     Large bowel ischemia  73 yo female now s/p total abdominal colectomy with end ileostomy, takeback for bleeding on 1/16, now stable and off pressors    Continue CLD   Worsening leukocytosis with left shift - recommend repeat CT abd/pelvis over the weekend  Encourage IS use   Rest of care per primary             Ursula Avelar MD  General Surgery  [facility] - Intensive Care

## 2023-01-21 NOTE — PROGRESS NOTES
"[facility] Progress Note    Subjective:          Objective:   Last 24 Hour Vital Signs:  BP  Min: 121/71  Max: 160/73  Temp  Av.2 °F (36.8 °C)  Min: 97.8 °F (36.6 °C)  Max: 98.6 °F (37 °C)  Pulse  Av.5  Min: 69  Max: 106  Resp  Av.6  Min: 15  Max: 39  SpO2  Av.9 %  Min: 91 %  Max: 100 %  Height  Av' 5" (165.1 cm)  Min: 5' 5" (165.1 cm)  Max: 5' 5" (165.1 cm)  Weight  Av kg (130 lb 1.1 oz)  Min: 59 kg (130 lb 1.1 oz)  Max: 59 kg (130 lb 1.1 oz)  I/O last 3 completed shifts:  In: 3712.8 [P.O.:240; Other:250; NG/GT:2270; IV Piggyback:952.8]  Out: 4700 [Other:2500; Stool:2200]    Physical Examination:  Gen: awake, not conversant   HEENT: normocephalic, EOMI, MMM  CV: RRR, no murmurs   Lungs: CTAB, symmetric chest rise  GI: Soft, tenderness resolving   Extrem: no edema of LE or UE  Skin: dry, warm, intact.       Laboratory:  Recent Labs   Lab 23  0348 23  0549 23  0436   WBC 21.23* 32.80* 45.98*   HGB 8.6* 9.3* 8.8*   HCT 25.0* 27.6* 25.5*   * 151 172   * 133* 127*   K 4.1 4.7 4.9    100 97   CREATININE 4.5* 3.9* 4.7*   BUN 58* 53* 72*   CO2 22* 24 22*   ALT 1,259* 1,031* 720*   AST 1,095* 512* 233*     Radiology:  Imaging has been reviewed personally.       Assessment and Plan:     Lily Green is a 72 y.o. woman with a history of celiac disease, DM2 well controlled, HTN, anxiety presented after having a fall. Noted to have severe lactic acidosis with imaging concerning for ischemic colitis. Became hypotensive and started on levophed and vasopressin for shock. On arrival to [facility] taken to OR for colectomy on . UOP severely diminished and essentially became anuric with only 50ml UOP. [facility] consulted for severe metabolic acidosis with pH 7.14 and HINA in setting of septic shock and ischemic colitis. HD initiated  and had one session with improvement in acidosis. Developed hemorrhagic shock with bleeding into the abdomen  " and taken back to OR for exlap and ileostomy creation. CRRT initiated 1/16, now off pressors and back on iHD.      Stage III Anuric HINA  - baseline creatinine 0.8  - presenting in shock on pressors with ischemic colitis.  s/p colectomy 1/14 but still with refractory acidosis and minimal UOP after. Another insult on 1/16 after hemorrhagic shock  - ATN with 2 separate insults will likely take several weeks to months to recover   - HD initiated 1/14; CRRT initiated 1/16 and discontinued 1/17; iHD resumed 1/17  - remains anuric  - HD yesterday tolerated well;  HD again today  - renally dose all medications      Anion Gap Metabolic Acidosis  - improved with HD     Hyponatremia  -  most consistent with hypervolemia, improving with HD     Hyperphosphatemia  - phos low after HD now, today 2.5, binder not clinically indicated. Prescribed diabetosource tube feeds; does not need renal diet      Hypocalcemia  - Calcium  7.7,corrects to  9.3 for hypoalbuminemia       Thank you for allowing us to participate in the care of this patient. Please call with any questions.       Alex Jones, DO  [facility]

## 2023-01-21 NOTE — PLAN OF CARE
Problem: Adult Inpatient Plan of Care  Goal: Plan of Care Review  Outcome: Ongoing, Progressing   Oriented to self, place, and situation. Needs reorienting to time and year. Vital signs stable. Pain managed. Tolerating PO intake. Anuric; plan for HD today. Full bath and sterile central line dressing change. Repositioned frequently. Safety precautions in place. Plan of care reviewed with patient.

## 2023-01-21 NOTE — PROGRESS NOTES
01/21/23 1150   Post-Hemodialysis Assessment   Blood Volume Processed (Liters) 73.4 L   Dialyzer Clearance Lightly streaked   Duration of Treatment 180 minutes   Additional Fluid Intake (mL) 500 mL   Total UF (mL) 2500 mL   Net Fluid Removal 2000   Patient Response to Treatment Tx completed, tolerated well, lines flushed and packed with saline to filling volume with new end caps   Post-Treatment Weight 60 kg (132 lb 4.4 oz)   Treatment Weight Change 60   Post-Hemodialysis Comments no distress noted

## 2023-01-21 NOTE — NURSING TRANSFER
Nursing Transfer Note      1/21/2023     Reason patient is being transferred: Stepdown from ICU    Transfer From: ICU To: 505    Transfer via bed    Transfer with all belongings    Transported by this RN & transporter    Medicines sent: Yes    Any special needs or follow-up needed: CT scan this afternoon    Chart send with patient: Yes    Notified: spouse, Deric, and patient's sister at bedside upon transfer    Upon arrival to floor: patient oriented to room, call bell in reach, and bed in lowest position.  Accepting RN at bedside upon arrival.

## 2023-01-21 NOTE — PLAN OF CARE
Problem: Physical Therapy  Goal: Physical Therapy Goal  Description: Goals to be met by: 2023     Patient will increase functional independence with mobility by performin. Supine to sit with Maximal Assistance  2. Rolling with Moderate Assistance.  3. Sitting at edge of bed x10 minutes with Minimal Assistance and Moderate Assistance  4. Increased functional strength to at least 3 for transfers and bed mobility-met 2023  Amend goals as appropriate    Outcome: Ongoing, Progressing   Pt continues to work and progress toward all goals. Able to perform a sit to stand transfer trial as well as take ~3-4 side steps (demonstrated a decreased foot clearance from floor) both with RW requiring   min/mod A.

## 2023-01-21 NOTE — ASSESSMENT & PLAN NOTE
73 yo female now s/p total abdominal colectomy with end ileostomy, takeback for bleeding on 1/16, now stable and off pressors    Continue CLD   Worsening leukocytosis with left shift - recommend repeat CT abd/pelvis over the weekend  Encourage IS use   Rest of care per primary

## 2023-01-21 NOTE — PLAN OF CARE
Problem: Adult Inpatient Plan of Care  Goal: Plan of Care Review  Outcome: Ongoing, Progressing  Goal: Patient-Specific Goal (Individualized)  Outcome: Ongoing, Progressing  Goal: Absence of Hospital-Acquired Illness or Injury  Outcome: Ongoing, Progressing  Goal: Optimal Comfort and Wellbeing  Outcome: Ongoing, Progressing     Problem: Diabetes Comorbidity  Goal: Blood Glucose Level Within Targeted Range  Outcome: Ongoing, Progressing     Problem: Infection  Goal: Absence of Infection Signs and Symptoms  Outcome: Ongoing, Progressing     Problem: Skin Injury Risk Increased  Goal: Skin Health and Integrity  Outcome: Ongoing, Progressing     Problem: Fall Injury Risk  Goal: Absence of Fall and Fall-Related Injury  Outcome: Ongoing, Progressing     Problem: Device-Related Complication Risk (Hemodialysis)  Goal: Safe, Effective Therapy Delivery  Outcome: Ongoing, Progressing     POC reviewed with patient and family today. All questions answered.

## 2023-01-21 NOTE — PT/OT/SLP PROGRESS
Physical Therapy Treatment    Patient Name:  Lily Green   MRN:  [id number]    Recommendations:     Discharge Recommendations: LTACH (long-term acute care hospital) (Post Acute)  Discharge Equipment Recommendations:  (TBD)  Barriers to discharge:  Requires increased assistance with all functional mobility    Assessment:     Lily Green is a 72 y.o. female admitted with a medical diagnosis of Peritonitis.  She presents with the following impairments/functional limitations: weakness, impaired endurance, impaired self care skills, impaired functional mobility, gait instability, impaired balance, decreased coordination, decreased upper extremity function, decreased lower extremity function, decreased safety awareness, pain, impaired coordination, decreased ROM, impaired skin. Pt able to sit at EOB ~10 minutes initially with CGA then SBA. Able to perform a sit to stand transfer trial with RW requiring min/mod A. Also, able to take ~3-4 side steps (decreased foot clearance from floor) with RW requiring min/mod a with assistance to advance RW. Would benefit from continued PT services to increase out of bed therapeutic activity and exercise.    Rehab Prognosis:  Good/Fair+ ; patient would benefit from acute skilled PT services to address these deficits and reach maximum level of function.    Recent Surgery: Procedure(s) (LRB):  LAPAROTOMY, EXPLORATORY (N/A)  CLOSURE, COLOSTOMY (Left)  CREATION, COLOSTOMY (Right) 5 Days Post-Op    Plan:     During this hospitalization, patient to be seen 5 x/week to address the identified rehab impairments via gait training, therapeutic activities, therapeutic exercises, neuromuscular re-education and progress toward the following goals:    Plan of Care Expires:  02/18/23    Subjective     Chief Complaint: none Expressed  Patient/Family Comments/goals: None Expressed  Pain/Comfort:  Pain Rating 1: 0/10  Location - Orientation 1: generalized  Location 1:  (ostomy site  (right lower))  Pain Addressed 1: Reposition, Cessation of Activity  Pain Rating Post-Intervention 1: 3/10 (after retunrned supine)      Objective:     Communicated with nurse prior to session.  Patient found supine with bed alarm, colostomy upon PT entry to room.     General Precautions: Standard, fall, respiratory  Orthopedic Precautions: N/A  Braces: N/A  Respiratory Status: Room air     Functional Mobility:  Bed Mobility:     Rolling Left:  minimum assistance, moderate assistance, and log roll performed  Scooting: contact guard assistance and minimum assistance  Supine to Sit: minimum assistance, moderate assistance, and pt instructed to perform log roll and use bed rail  Sit to Supine: minimum assistance, moderate assistance, and pt instructed to perform log roll and assistance to advance BLE's  Transfers:     Sit to Stand:  minimum assistance and moderate assistance with rolling walker  Gait: ~3-4 side steps with RW requiring min/mod A plus assistance to advance RW      AM-PAC 6 CLICK MOBILITY  Turning over in bed (including adjusting bedclothes, sheets and blankets)?: 2 (Instructed to perfrom log roll)  Sitting down on and standing up from a chair with arms (e.g., wheelchair, bedside commode, etc.): 3  Moving from lying on back to sitting on the side of the bed?: 2 (Instructed to perform log roll)  Moving to and from a bed to a chair (including a wheelchair)?: 1  Need to walk in hospital room?: 2  Climbing 3-5 steps with a railing?: 1  Basic Mobility Total Score: 11       Treatment & Education:  Pt able to sit at EOB ~10 minutes initially with CGA then progressed to SBA. Performed 1 x 10 LAQ's and hip/knee flexion (seated marches) BLE's. 1 sit to stand transfer trial with RW requiring min/mod A. Pt able to take ~3-4 side steps (demonstrated a decreased foot clearance from floor) with RW requiring min/mod A and assistance to advance RW. Increased time to complete with encouragement to continue. Session ended  as transport entered room to take pt for a CT scan.    Patient left supine with all lines intact, call button in reach, bed alarm on, nurse notified, and , sister, and transport  present..    GOALS:   Multidisciplinary Problems       Physical Therapy Goals          Problem: Physical Therapy    Goal Priority Disciplines Outcome Goal Variances Interventions   Physical Therapy Goal     PT, PT/OT Ongoing, Progressing     Description: Goals to be met by: 2023     Patient will increase functional independence with mobility by performin. Supine to sit with Maximal Assistance  2. Rolling with Moderate Assistance.  3. Sitting at edge of bed x10 minutes with Minimal Assistance and Moderate Assistance  4. Increased functional strength to at least 3 for transfers and bed mobility-met 2023  Amend goals as appropriate                         Time Tracking:     PT Received On: 23  PT Start Time: 1330     PT Stop Time: 1353  PT Total Time (min): 23 min     Billable Minutes: Therapeutic Activity 23    Treatment Type: Treatment  PT/PTA: PTA     PTA Visit Number: 1     2023

## 2023-01-21 NOTE — PROGRESS NOTES
[facility]  Progress Notes       Admit Date: 2023   LOS: 8 days     Chief Complaint/Reason for Admission: Shock    HPI     Patient is a 73 y/o female with PMHx of diabetes type 2 (treated with metformin), HTN, and celiac disease (following a strict diet) who presented to OSH after falling and injuring her face while getting out of bed. The patient reported experiencing nausea, vomiting, diarrhea, abdominal pain, and weakness starting the day before. She has had a weight loss of 60 pounds over an extended period of time, which may have been partially caused by her diet. The patient's review of systems was otherwise normal. Of note patient is currently taking Lyrica, which has recently been increased. On evaluation at bedside, patient feels good just with mild abdominal pain. No other symptoms. Denies LOC with falls, does not remember if she hit her head.     ED Course: Presented hypotensive and tachycardic. Labs showed elevated WBC of 26.16, Glucose 269, , LA 7, and Beta-hydroxybutyrate 0.6. She was acidotic with ph of 7.282 and AG 21. Rest of the labs unremarkable. CT ab/pel revealed distention of the colon with a large amount of stool and fluid, and a segment of the colon that appeared narrowed with possible thickening of the wall, strongly suspected to be a colonic mass. S/p enema and disimpaction. The patient was given fluids (3 L) and antibiotics and transferred for further evaluation and possible surgery       Interval History: Overnight, did have episode of hypogylcemia of 51 overnight but was asymptomatic. This AM, patient with no complaints. Tolerating clear liquids yesterday without N/V. Oriented to person and month, but not to place.       PMHx  Past Medical History:   Diagnosis Date    Anxiety     Celiac disease 2018    Celiac disease     Depression     Diabetes mellitus     Family history of breast cancer in mother      at age 68     Hyperlipidemia     Hypertension     Meniere disease     Menopause     Peptic ulcer     Reflux esophagitis     Urinary tract infection     Vaginal infection     Vaginal Pap smear 04/07/2014    Pap/Hpv Negative         PSHx  Past Surgical History:   Procedure Laterality Date    APPENDECTOMY      BREAST SURGERY      Tags    CARPAL TUNNEL RELEASE Bilateral 09/2017    ,     CLOSURE, COLOSTOMY Left 1/16/2023    Procedure: CLOSURE, COLOSTOMY;  Surgeon: Manuel Javier MD;  Location: [facility] OR;  Service: General;  Laterality: Left;    COLONOSCOPY  04/2018    Normal  ([place] [facility])     COLOSTOMY Right 1/16/2023    Procedure: CREATION, COLOSTOMY;  Surgeon: Manuel Javier MD;  Location: [facility] OR;  Service: General;  Laterality: Right;    DILATION AND CURETTAGE OF UTERUS  1986    DUPUYTREN CONTRACTURE RELEASE Bilateral 09/2017    HYSTERECTOMY  1987    BEAU w/ appy, no BSO     INJECTION OF NEUROLYTIC AGENT AROUND LUMBAR SYMPATHETIC NERVE N/A 1/6/2022    Procedure: BLOCK, LUMBAR SYMPATHETIC;  Surgeon: Souleyamne Rizo Jr., MD;  Location: [facility] PAIN MGT;  Service: Pain Management;  Laterality: N/A;  no pacemaker   pt is diabetic     INJECTION OF NEUROLYTIC AGENT AROUND LUMBAR SYMPATHETIC NERVE N/A 8/25/2022    Procedure: BLOCK, LUMBAR SYMPATHETIC;  Surgeon: Souleymane Rizo Jr., MD;  Location: [facility] PAIN [facility]GT;  Service: Pain Management;  Laterality: N/A;  diabetic     LAPAROTOMY, EXPLORATORY N/A 1/14/2023    Procedure: LAPAROTOMY, EXPLORATORY;  Surgeon: Manuel Javier MD;  Location: [facility] OR;  Service: General;  Laterality: N/A;    LAPAROTOMY, EXPLORATORY N/A 1/16/2023    Procedure: LAPAROTOMY, EXPLORATORY;  Surgeon: Manuel Javier MD;  Location: [facility] OR;  Service: General;  Laterality: N/A;    SHOULDER SURGERY  2009 & 2010    right rotator cuff    TONSILLECTOMY      UPPER GASTROINTESTINAL ENDOSCOPY  04/2018       Family History  Family History   Problem Relation Age of Onset    Vision loss Father     Arthritis Father      Breast cancer Mother     Cancer Mother     Vision loss Mother     Hyperlipidemia Sister     Breast cancer Paternal Aunt     Cancer Paternal Aunt     Diabetes Paternal Grandfather     Vision loss Sister     Kidney disease Neg Hx        Social  Social History     Socioeconomic History    Marital status:    Tobacco Use    Smoking status: Never    Smokeless tobacco: Never   Substance and Sexual Activity    Alcohol use: No    Drug use: Never    Sexual activity: Not Currently     Partners: Male     Birth control/protection: See Surgical Hx     Comment: :      Social Determinants of Health     Financial Resource Strain: Low Risk     Difficulty of Paying Living Expenses: Not hard at all   Food Insecurity: No Food Insecurity    Worried About Running Out of Food in the Last Year: Never true    Ran Out of Food in the Last Year: Never true   Transportation Needs: No Transportation Needs    Lack of Transportation (Medical): No    Lack of Transportation (Non-Medical): No   Physical Activity: Sufficiently Active    Days of Exercise per Week: 7 days    Minutes of Exercise per Session: 50 min   Stress: Stress Concern Present    Feeling of Stress : Very much   Social Connections: Unknown    Frequency of Communication with Friends and Family: More than three times a week    Frequency of Social Gatherings with Friends and Family: More than three times a week    Active Member of Clubs or Organizations: Yes    Attends Club or Organization Meetings: More than 4 times per year    Marital Status:    Housing Stability: Low Risk     Unable to Pay for Housing in the Last Year: No    Number of Places Lived in the Last Year: 1    Unstable Housing in the Last Year: No         Allergies  Review of patient's allergies indicates:   Allergen Reactions    Crestor [rosuvastatin] Swelling    Gluten protein        ROS- As per HPI, otherwise negative  Review of Systems   Constitutional:  Negative for chills and fever.   HENT:   Positive for sore throat.    Eyes:  Negative for blurred vision.   Respiratory:  Negative for cough, shortness of breath and wheezing.    Cardiovascular:  Negative for chest pain and palpitations.   Gastrointestinal:  Positive for abdominal pain. Negative for nausea and vomiting.   Neurological:  Positive for weakness.   Psychiatric/Behavioral:  Positive for memory loss.      Objective  Vitals(Most Recent)      Temp: 98.5 °F (36.9 °C) (01/20/23 1600)  Pulse: 81 (01/21/23 0645)  Resp: (!) 33 (01/21/23 0645)  BP: 133/65 (01/21/23 0600)  SpO2: 99 % (01/21/23 0645)           Vitals Range  Temp:  [97.8 °F (36.6 °C)-98.6 °F (37 °C)]   Pulse:  []   Resp:  [16-39]   BP: (121-160)/(65-76)   SpO2:  [91 %-100 %]     I/O  I/O last 3 completed shifts:  In: 2168.1 [P.O.:240; NG/GT:1360; IV Piggyback:568.1]  Out: 2650 [Stool:2650]    Intake/Output Summary (Last 24 hours) at 1/21/2023 0709  Last data filed at 1/21/2023 0615  Gross per 24 hour   Intake 1308.46 ml   Output 875 ml   Net 433.46 ml           Physical Exam  Constitutional:       General: She is not in acute distress.     Appearance: Normal appearance. She is normal weight.   HENT:      Head: Normocephalic and atraumatic.      Comments: Facial laceration on lip and ecchymosis of L eye     Mouth/Throat:      Mouth: Mucous membranes are moist.      Pharynx: Oropharynx is clear.   Eyes:      Extraocular Movements: Extraocular movements intact.      Conjunctiva/sclera: Conjunctivae normal.      Pupils: Pupils are equal, round, and reactive to light.   Cardiovascular:      Rate and Rhythm: Normal rate and regular rhythm.      Pulses: Normal pulses.      Heart sounds: Normal heart sounds.   Pulmonary:      Effort: Pulmonary effort is normal.      Breath sounds: Normal breath sounds.   Abdominal:      General: Abdomen is flat. There is no distension.      Palpations: Abdomen is soft.      Tenderness: There is generalized abdominal tenderness. There is no guarding.    Musculoskeletal:      Cervical back: Normal range of motion.      Right lower leg: No edema.      Left lower leg: No edema.   Skin:     General: Skin is warm.      Capillary Refill: Capillary refill takes 2 to 3 seconds.      Comments: Incision site clean dry and intact   Neurological:      Mental Status: She is confused.      Motor: Weakness present.      Comments: Significant neurological improvement, conversant oriented and following all commands  Some delay to complex questions and short term recall         Labs  Recent Labs   Lab 01/21/23  0436   WBC 45.98*   RBC 3.11*   HGB 8.8*   HCT 25.5*      MCV 82   MCH 28.3   MCHC 34.5        Recent Labs   Lab 01/21/23  0436   CALCIUM 7.7*   PROT 5.4*   *   K 4.9   CO2 22*   CL 97   BUN 72*   CREATININE 4.7*   ALKPHOS 113   *   *   BILITOT 0.9          Lab Results   Component Value Date    INR 1.3 (H) 01/17/2023    INR 1.6 (H) 01/16/2023    INR 1.6 (H) 01/16/2023    APTT 33.2 (H) 01/17/2023       No results for input(s): TROPONINI, CPKMB, CPK in the last 72 hours.      A1c:   Lab Results   Component Value Date    HGBA1C 5.8 (H) 11/01/2022     , Last Gluc:   Recent Labs   Lab 01/20/23  1211 01/20/23  1551 01/20/23  2019 01/21/23  0120 01/21/23  0156 01/21/23  0257   POCTGLUCOSE 240* 170* 51* 54* 63* 74         TSH:   Lab Results   Component Value Date    TSH 1.821 01/13/2023       UA  Urinalysis    Micro  Microbiology Results (last 7 days)       Procedure Component Value Units Date/Time    Blood culture [[id number]] Collected: 01/17/23 1927    Order Status: Completed Specimen: Blood Updated: 01/21/23 0612     Blood Culture, Routine No Growth to date      No Growth to date      No Growth to date      No Growth to date    Blood culture [[id number]] Collected: 01/17/23 1927    Order Status: Completed Specimen: Blood Updated: 01/21/23 0612     Blood Culture, Routine No Growth to date      No Growth to date      No Growth to date      No Growth to  date    Blood culture [[id number]]     Order Status: Canceled Specimen: Blood     Clostridium difficile EIA [[id number]] Collected: 01/14/23 0241    Order Status: Completed Specimen: Stool Updated: 01/16/23 0954     C. diff Antigen Negative     C difficile Toxins A+B, EIA Negative     Comment: Testing not recommended for children <24 months old.                ABG  Recent Labs   Lab 01/14/23  0842 01/14/23  1207 01/16/23  1615   PH 7.142* 7.304* 7.319*   PCO2 49.2* 26.1* 30.7*   PO2 160* 95 74*   HCO3 16.8* 12.9* 15.8*   POCSATURATED 99 97 94*   BE -12 -13 -10             Assessment/Plan:  Lily Green is a 72 y.o. female patient w/ PMHx of diabetes type 2 (treated with metformin), HTN, and celiac disease (following a strict diet) who presented to the ED after a fall. 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 Still in ICU for high nursing needs and on-going ICU delirium/weakness     Neuro/Psych  #AMS, Acute illness delirium  Appears lethargic, confused, but does answer questions appropriately  H/o of frequent falls  Home Lyrica was recently increased.  CTH unremarkable 1/13  Minimally responsive on interview  Hypovolemic shock and surgery on 1/17/23  CT head for weakness in the right arm  CT head negative 1/18     Plan  - Continue to monitor  - No night time vitals or labs  - Encourage regular sleep pattern, lights on during day and out at night   - Continue up and out of bed PT/OT      CV  #Hemorrhagic Shock - RESOLVED  #Hx HTN  Lactate downtrended   - Pressors off since 1/16  - Will continue abx  - ECHO 1/14 showed EF 35%, grade I diastolic dysfunction      Pulm  On RA no issues  1/20 POCUS w/ basilar atelectasis and bilateral effusions and ascites       FEN/GI  #Ischemic bowel, secondary to Toxic Sheldon colon  #Acute  Abdomen  Although abdomen non concerning, imaging concerning  Also elevated LA concerning for ischemic bowel  Amylase greatly elevated pointing to ischemia  CT ab/pel revealing distention of the colon with a large amount of stool and fluid, and a segment of the colon that appeared narrowed with possible thickening of the wall, strongly suspected to be a colonic mass  Surgery s/p colon resection revealed no evidence of mass  No evidence of prior CV disease   Etiology unknown  Bladder pressure wnl  ABG ph 7.319 PCO2 30.7, O2 74 Bicarb 15.8  NG removed 1/20     Plan:  Gen surg consulted. Appreciate recs  -Passed swallow study on 1/20. Currently on clear liquid diet, ensure gluten free  - Keep Mg 2, K 4  - Zofran prn for n/v     #Shock Liver - improving  In setting of hemorrhagic shock   Monitor AST/ALT  as noted on CT abd and bedside US1/19, improved on most recent check  AST/ALT improved to 233/720 (512/1031)      #Enlarged Gallbladder  Visualized on CT and US  Not showing prominent signs of infection on initial imaging  Bilirubin WNL   Could be potential source of infection if patient becomes septic     RENAL  #Acute renal failure  Likely 2/2 to shock  BUN/Cr: 58/1.7 > 60/1.9  UOP: minimal despite aggressive IVF  - HD on today 1/19/23  -  in 24 hr  -Worsened BUN 62/4.1  -Phos 8.5  RIJ temp HD line on 1/14  Still anuric    Plan  -Strict I&Os,maintain euvolemia   -Avoid renal toxic meds   -Continue to monitor renal status and urine output   -Nephro consulted. Appreciate recs. Plan for HD 1/21    -Bladder scans PRN    Heme  #Status post bowel resection  H/H down from 17.2/50.7 on admit  10/27.9  WBC increased 13 >18 monitor  PT/INR 16.4/1.6  DVT prophylaxis: lovenox       #Anemia, coagulopathy in setting of critical illness  H/H dropped from 10 to 6.2 overnight  Down from 17 on admit  Initial factors dilution, and blood loss from colectomy 1/4/23  New drop of unknown cause  No free fluid visualized on Bedside abd  US    Plan    H/H stable, will continue to monitor    Monitor signs of bleeding, bruising, bloody ostomy output        Endo  #T2DM  Elevated Bgs, AG, and slight B-hydrox  A1c 5.8 2 months ago     Plan:  - Maintain glucose 140-180  - IVF prn  - SSI and long acting  - BG elevated in Am  -SSI changed to moderate scale  - Held long-acting 1/21 due to hypoglycemia, can add back on if not within goal   - F/u BMP      ID  #Gram negative Bacteremia  - Gram negative rods reported in Blood culture from 1/13  - Continue Cefepime, flagyl for total of 14 day course  -  All further cultures on 1/17 w/ NGT  - WBC 45 on 1/21 -> 32.80. Due to increasing WBC, will get repeat imaging CT Abdomen and Pelvis to check for abscess  - No tylenol to see if patient fevers, unless needed for pain    DVT PPx: SCD,heparin  Diet: full liquids   GI PPX: None  Deconditioning: PT/OT  Code Status: Full        Dispo  - Stable for step down to the floor, no longer requiring closer monitoring of vital signs      iGn Miller MD  [facility], PGY-1  01/21/2023

## 2023-01-21 NOTE — SUBJECTIVE & OBJECTIVE
Interval History:   Patient seen and examined.  NAEON.  HD today. Tolerated sips of CLD.  Ostomy productive.  Pain controlled.      Medications:  Continuous Infusions:  Scheduled Meds:   sodium chloride 0.9%   Intravenous Once    ceFEPime (MAXIPIME) IVPB  2 g Intravenous Q24H    heparin (porcine)  5,000 Units Subcutaneous Q8H    metronidazole  500 mg Intravenous Q8H     PRN Meds:acetaminophen, dextrose 10%, dextrose 10%, dextrose 10%, dicyclomine, glucagon (human recombinant), glucose, glucose, heparin (porcine), hydrALAZINE, insulin aspart U-100, LIDOcaine, melatonin, ondansetron, promethazine, sodium chloride 0.9%, sodium chloride 0.9%     Review of patient's allergies indicates:   Allergen Reactions    Crestor [rosuvastatin] Swelling    Gluten protein      Objective:     Vital Signs (Most Recent):  Temp: 99.1 °F (37.3 °C) (01/21/23 1130)  Pulse: 88 (01/21/23 1130)  Resp: (!) 29 (01/21/23 1130)  BP: 135/68 (01/21/23 1130)  SpO2: (!) 94 % (01/21/23 1130)   Vital Signs (24h Range):  Temp:  [97.8 °F (36.6 °C)-100 °F (37.8 °C)] 99.1 °F (37.3 °C)  Pulse:  [69-93] 88  Resp:  [16-36] 29  SpO2:  [93 %-100 %] 94 %  BP: (121-160)/(63-78) 135/68     Weight: 59 kg (130 lb 1.1 oz)  Body mass index is 21.64 kg/m².    Intake/Output - Last 3 Shifts         01/19 0700  01/20 0659 01/20 0700  01/21 0659 01/21 0700  01/22 0659    P.O.  240 600    Other 250      NG/GT 1730 600     IV Piggyback 803.7 468.5 100    Total Intake(mL/kg) 2783.7 (47.2) 1308.5 (22.2) 700 (11.9)    Other 2500      Stool 1775 875     Total Output 4275 875     Net -1491.3 +433.5 +700                   Physical Exam  Constitutional:       General: She is not in acute distress.     Appearance: She is not ill-appearing or toxic-appearing.   Cardiovascular:      Rate and Rhythm: Normal rate and regular rhythm.   Pulmonary:      Effort: No respiratory distress.      Comments: Tachypnea   Abdominal:      General: There is no distension.      Palpations: Abdomen is  soft.      Tenderness: There is no abdominal tenderness. There is no guarding.      Hernia: No hernia is present.      Comments: Midline incision C/D/I with staples in place   LLQ incision C/D/I with staples in place   RLQ ostomy pink, patent, and viable with stool and air in the bag    Skin:     Capillary Refill: Capillary refill takes less than 2 seconds.   Neurological:      Mental Status: She is alert. Mental status is at baseline.       Significant Labs:  I have reviewed all pertinent lab results within the past 24 hours.  CBC:   Recent Labs   Lab 01/21/23 0436   WBC 45.98*   RBC 3.11*   HGB 8.8*   HCT 25.5*      MCV 82   MCH 28.3   MCHC 34.5     CMP:   Recent Labs   Lab 01/21/23 0436   GLU 75   CALCIUM 7.7*   ALBUMIN 2.0*   PROT 5.4*   *   K 4.9   CO2 22*   CL 97   BUN 72*   CREATININE 4.7*   ALKPHOS 113   *   *   BILITOT 0.9       Significant Diagnostics:  I have reviewed all pertinent imaging results/findings within the past 24 hours.

## 2023-01-22 PROBLEM — E87.1 HYPONATREMIA: Status: ACTIVE | Noted: 2023-01-22

## 2023-01-22 LAB
ALBUMIN SERPL BCP-MCNC: 1.9 G/DL (ref 3.5–5.2)
ALP SERPL-CCNC: 116 U/L (ref 55–135)
ALT SERPL W/O P-5'-P-CCNC: 479 U/L (ref 10–44)
ANION GAP SERPL CALC-SCNC: 9 MMOL/L (ref 8–16)
ANISOCYTOSIS BLD QL SMEAR: SLIGHT
AST SERPL-CCNC: 113 U/L (ref 10–40)
BASO STIPL BLD QL SMEAR: ABNORMAL
BASOPHILS NFR BLD: 0 % (ref 0–1.9)
BILIRUB SERPL-MCNC: 0.7 MG/DL (ref 0.1–1)
BUN SERPL-MCNC: 39 MG/DL (ref 8–23)
BURR CELLS BLD QL SMEAR: ABNORMAL
CALCIUM SERPL-MCNC: 8.1 MG/DL (ref 8.7–10.5)
CHLORIDE SERPL-SCNC: 94 MMOL/L (ref 95–110)
CO2 SERPL-SCNC: 23 MMOL/L (ref 23–29)
CREAT SERPL-MCNC: 3.5 MG/DL (ref 0.5–1.4)
DACRYOCYTES BLD QL SMEAR: ABNORMAL
DIFFERENTIAL METHOD: ABNORMAL
EOSINOPHIL NFR BLD: 0 % (ref 0–8)
ERYTHROCYTE [DISTWIDTH] IN BLOOD BY AUTOMATED COUNT: 19.1 % (ref 11.5–14.5)
EST. GFR  (NO RACE VARIABLE): 13 ML/MIN/1.73 M^2
GLUCOSE SERPL-MCNC: 139 MG/DL (ref 70–110)
HCT VFR BLD AUTO: 24.4 % (ref 37–48.5)
HGB BLD-MCNC: 8.3 G/DL (ref 12–16)
IMM GRANULOCYTES # BLD AUTO: ABNORMAL K/UL
IMM GRANULOCYTES NFR BLD AUTO: ABNORMAL %
LYMPHOCYTES NFR BLD: 4 % (ref 18–48)
MAGNESIUM SERPL-MCNC: 2 MG/DL (ref 1.6–2.6)
MCH RBC QN AUTO: 27.5 PG (ref 27–31)
MCHC RBC AUTO-ENTMCNC: 34 G/DL (ref 32–36)
MCV RBC AUTO: 81 FL (ref 82–98)
MONOCYTES NFR BLD: 5 % (ref 4–15)
MYELOCYTES NFR BLD MANUAL: 1 %
NEUTROPHILS NFR BLD: 90 % (ref 38–73)
NRBC BLD-RTO: 0 /100 WBC
OVALOCYTES BLD QL SMEAR: ABNORMAL
PHOSPHATE SERPL-MCNC: 2.6 MG/DL (ref 2.7–4.5)
PLATELET # BLD AUTO: 248 K/UL (ref 150–450)
PLATELET BLD QL SMEAR: ABNORMAL
PMV BLD AUTO: 10.4 FL (ref 9.2–12.9)
POCT GLUCOSE: 128 MG/DL (ref 70–110)
POCT GLUCOSE: 143 MG/DL (ref 70–110)
POCT GLUCOSE: 202 MG/DL (ref 70–110)
POCT GLUCOSE: 259 MG/DL (ref 70–110)
POIKILOCYTOSIS BLD QL SMEAR: SLIGHT
POLYCHROMASIA BLD QL SMEAR: ABNORMAL
POTASSIUM SERPL-SCNC: 4.7 MMOL/L (ref 3.5–5.1)
PROT SERPL-MCNC: 5.6 G/DL (ref 6–8.4)
RBC # BLD AUTO: 3.02 M/UL (ref 4–5.4)
SODIUM SERPL-SCNC: 126 MMOL/L (ref 136–145)
WBC # BLD AUTO: 43.91 K/UL (ref 3.9–12.7)

## 2023-01-22 PROCEDURE — 11000001 HC ACUTE MED/SURG PRIVATE ROOM

## 2023-01-22 PROCEDURE — 80053 COMPREHEN METABOLIC PANEL: CPT | Performed by: STUDENT IN AN ORGANIZED HEALTH CARE EDUCATION/TRAINING PROGRAM

## 2023-01-22 PROCEDURE — 25000003 PHARM REV CODE 250

## 2023-01-22 PROCEDURE — 84100 ASSAY OF PHOSPHORUS: CPT | Performed by: STUDENT IN AN ORGANIZED HEALTH CARE EDUCATION/TRAINING PROGRAM

## 2023-01-22 PROCEDURE — S0030 INJECTION, METRONIDAZOLE: HCPCS

## 2023-01-22 PROCEDURE — 85007 BL SMEAR W/DIFF WBC COUNT: CPT | Performed by: FAMILY MEDICINE

## 2023-01-22 PROCEDURE — 94799 UNLISTED PULMONARY SVC/PX: CPT

## 2023-01-22 PROCEDURE — 63600175 PHARM REV CODE 636 W HCPCS: Performed by: STUDENT IN AN ORGANIZED HEALTH CARE EDUCATION/TRAINING PROGRAM

## 2023-01-22 PROCEDURE — 92526 ORAL FUNCTION THERAPY: CPT

## 2023-01-22 PROCEDURE — 94761 N-INVAS EAR/PLS OXIMETRY MLT: CPT

## 2023-01-22 PROCEDURE — 99024 PR POST-OP FOLLOW-UP VISIT: ICD-10-PCS | Mod: ,,, | Performed by: STUDENT IN AN ORGANIZED HEALTH CARE EDUCATION/TRAINING PROGRAM

## 2023-01-22 PROCEDURE — 63600175 PHARM REV CODE 636 W HCPCS

## 2023-01-22 PROCEDURE — 85027 COMPLETE CBC AUTOMATED: CPT | Performed by: FAMILY MEDICINE

## 2023-01-22 PROCEDURE — 99900035 HC TECH TIME PER 15 MIN (STAT)

## 2023-01-22 PROCEDURE — 99024 POSTOP FOLLOW-UP VISIT: CPT | Mod: ,,, | Performed by: STUDENT IN AN ORGANIZED HEALTH CARE EDUCATION/TRAINING PROGRAM

## 2023-01-22 PROCEDURE — 83735 ASSAY OF MAGNESIUM: CPT | Performed by: STUDENT IN AN ORGANIZED HEALTH CARE EDUCATION/TRAINING PROGRAM

## 2023-01-22 RX ADMIN — HEPARIN SODIUM 5000 UNITS: 5000 INJECTION INTRAVENOUS; SUBCUTANEOUS at 02:01

## 2023-01-22 RX ADMIN — METRONIDAZOLE 500 MG: 5 INJECTION, SOLUTION INTRAVENOUS at 05:01

## 2023-01-22 RX ADMIN — HEPARIN SODIUM 5000 UNITS: 5000 INJECTION INTRAVENOUS; SUBCUTANEOUS at 06:01

## 2023-01-22 RX ADMIN — INSULIN ASPART 4 UNITS: 100 INJECTION, SOLUTION INTRAVENOUS; SUBCUTANEOUS at 05:01

## 2023-01-22 RX ADMIN — METRONIDAZOLE 500 MG: 5 INJECTION, SOLUTION INTRAVENOUS at 01:01

## 2023-01-22 RX ADMIN — METRONIDAZOLE 500 MG: 5 INJECTION, SOLUTION INTRAVENOUS at 08:01

## 2023-01-22 RX ADMIN — HEPARIN SODIUM 5000 UNITS: 5000 INJECTION INTRAVENOUS; SUBCUTANEOUS at 10:01

## 2023-01-22 RX ADMIN — CEFEPIME 2 G: 2 INJECTION, POWDER, FOR SOLUTION INTRAVENOUS at 10:01

## 2023-01-22 NOTE — ASSESSMENT & PLAN NOTE
73 yo female now s/p total abdominal colectomy with end ileostomy, takeback for bleeding on 1/16, now stable and off pressors    Advanced to mechanical soft diet   Leukocystosis slightly improved - CT abd/pelvis with nonspecific fluid collections without signs of organized abscess formation;  Findings consistent with recent laparotomy with washout  Echo planned per primary   Multimodal pain control  Continue PT/OT  Encourage IS use   Rest of care per primary

## 2023-01-22 NOTE — SUBJECTIVE & OBJECTIVE
Interval History: NAEON    Review of Systems   Constitutional:  Negative for fatigue and fever.   HENT:  Negative for congestion and sore throat.    Eyes:  Negative for visual disturbance.   Respiratory:  Negative for apnea, shortness of breath and wheezing.    Cardiovascular:  Negative for chest pain.   Gastrointestinal:  Positive for abdominal pain and diarrhea. Negative for nausea and vomiting.   Genitourinary:  Negative for dysuria.   Musculoskeletal:  Negative for arthralgias and back pain.   Neurological:  Negative for dizziness and headaches.   Psychiatric/Behavioral: Negative.     Objective:     Vital Signs (Most Recent):  Temp: 100.2 °F (37.9 °C) (01/22/23 0900)  Pulse: 87 (01/22/23 0900)  Resp: 18 (01/22/23 0900)  BP: (!) 147/69 (01/22/23 0900)  SpO2: 97 % (01/22/23 0900)   Vital Signs (24h Range):  Temp:  [97.6 °F (36.4 °C)-100.2 °F (37.9 °C)] 100.2 °F (37.9 °C)  Pulse:  [82-99] 87  Resp:  [18-33] 18  SpO2:  [93 %-97 %] 97 %  BP: (131-169)/(65-77) 147/69     Weight: 59 kg (130 lb 1.1 oz)  Body mass index is 21.64 kg/m².    Intake/Output Summary (Last 24 hours) at 1/22/2023 1032  Last data filed at 1/22/2023 0631  Gross per 24 hour   Intake 1762.23 ml   Output 4090 ml   Net -2327.77 ml      Physical Exam  HENT:      Head: Normocephalic.      Comments: Contusion over left eye     Right Ear: External ear normal.      Left Ear: External ear normal.      Nose: Nose normal.      Mouth/Throat:      Mouth: Mucous membranes are moist.   Eyes:      Pupils: Pupils are equal, round, and reactive to light.   Cardiovascular:      Rate and Rhythm: Normal rate.      Pulses: Normal pulses.   Pulmonary:      Effort: Pulmonary effort is normal.   Abdominal:      General: Abdomen is flat.      Tenderness: There is abdominal tenderness.      Comments: Laparotomy and Ostomy sites clear clean and intact  Profuse liquid output per ileostomy    Skin:     General: Skin is warm.   Neurological:      Mental Status: She is alert.       Comments: Increased attention, and focus  Follows all commands     Psychiatric:      Comments: Some confusion and possible delusions  States that she         Significant Labs: All pertinent labs within the past 24 hours have been reviewed.  BMP:   Recent Labs   Lab 23  034   *   *   K 4.7   CL 94*   CO2 23   BUN 39*   CREATININE 3.5*   CALCIUM 8.1*   MG 2.0     CBC:   Recent Labs   Lab 23  0436 23   WBC 45.98* 43.91*   HGB 8.8* 8.3*   HCT 25.5* 24.4*    248     CMP:   Recent Labs   Lab 23  0436 23  034   * 126*   K 4.9 4.7   CL 97 94*   CO2 22* 23   GLU 75 139*   BUN 72* 39*   CREATININE 4.7* 3.5*   CALCIUM 7.7* 8.1*   PROT 5.4* 5.6*   ALBUMIN 2.0* 1.9*   BILITOT 0.9 0.7   ALKPHOS 113 116   * 113*   * 479*   ANIONGAP 8 9       Significant Imaging: I have reviewed all pertinent imaging results/findings within the past 24 hours.

## 2023-01-22 NOTE — SUBJECTIVE & OBJECTIVE
Interval History:   Patient seen and examined.  CATHY.  Transferred to floor yesterday.  Has more energy today.  Worked well with PT yesterday.     Medications:  Continuous Infusions:  Scheduled Meds:   sodium chloride 0.9%   Intravenous Once    ceFEPime (MAXIPIME) IVPB  2 g Intravenous Q24H    heparin (porcine)  5,000 Units Subcutaneous Q8H    metronidazole  500 mg Intravenous Q8H     PRN Meds:acetaminophen, dextrose 10%, dextrose 10%, dextrose 10%, dicyclomine, glucagon (human recombinant), glucose, glucose, heparin (porcine), hydrALAZINE, insulin aspart U-100, LIDOcaine, melatonin, ondansetron, promethazine, sodium chloride 0.9%, sodium chloride 0.9%     Review of patient's allergies indicates:   Allergen Reactions    Crestor [rosuvastatin] Swelling    Gluten protein      Objective:     Vital Signs (Most Recent):  Temp: 98.9 °F (37.2 °C) (01/22/23 1317)  Pulse: 82 (01/22/23 1325)  Resp: 18 (01/22/23 1325)  BP: (!) 145/67 (01/22/23 1317)  SpO2: 97 % (01/22/23 1325)   Vital Signs (24h Range):  Temp:  [97.6 °F (36.4 °C)-100.2 °F (37.9 °C)] 98.9 °F (37.2 °C)  Pulse:  [82-96] 82  Resp:  [18-19] 18  SpO2:  [93 %-97 %] 97 %  BP: (131-169)/(65-77) 145/67     Weight: 59 kg (130 lb 1.1 oz)  Body mass index is 21.64 kg/m².    Intake/Output - Last 3 Shifts         01/20 0700  01/21 0659 01/21 0700  01/22 0659 01/22 0700  01/23 0659    P.O. 240 1500     Other  500     NG/      IV Piggyback 468.5 222.2     Total Intake(mL/kg) 1308.5 (22.2) 2222.2 (37.7)     Other  2500     Stool 875 1590     Total Output 875 4090     Net +433.5 -1867.8                    Physical Exam  Constitutional:       General: She is not in acute distress.     Appearance: She is not ill-appearing or toxic-appearing.   Cardiovascular:      Rate and Rhythm: Normal rate and regular rhythm.   Pulmonary:      Effort: Pulmonary effort is normal. No respiratory distress.   Abdominal:      General: There is no distension.      Palpations: Abdomen is soft.       Tenderness: There is no abdominal tenderness. There is no guarding.      Hernia: No hernia is present.      Comments: Midline incision C/D/I with staples in place   LLQ incision C/D/I with staples in place   RLQ ostomy pink, patient and viable with air and stool in bag    Skin:     Capillary Refill: Capillary refill takes less than 2 seconds.   Neurological:      Mental Status: She is alert. Mental status is at baseline.       Significant Labs:  I have reviewed all pertinent lab results within the past 24 hours.  CBC:   Recent Labs   Lab 01/22/23  0342   WBC 43.91*   RBC 3.02*   HGB 8.3*   HCT 24.4*      MCV 81*   MCH 27.5   MCHC 34.0     CMP:   Recent Labs   Lab 01/22/23 0342   *   CALCIUM 8.1*   ALBUMIN 1.9*   PROT 5.6*   *   K 4.7   CO2 23   CL 94*   BUN 39*   CREATININE 3.5*   ALKPHOS 116   *   *   BILITOT 0.7       Significant Diagnostics:  I have reviewed all pertinent imaging results/findings within the past 24 hours.

## 2023-01-22 NOTE — PROGRESS NOTES
[facility]  Progress Note    Patient Name: Lily Green  MRN: [id number]  Patient Class: IP- Inpatient   Admission Date: 1/13/2023  Length of Stay: 9 days  Attending Physician: Eduar Albarran III, MD  Primary Care Provider: Pavel Calixto MD        Subjective:     Principal Problem:Peritonitis        HPI:  No notes on file    Overview/Hospital Course:  No notes on file    Interval History: NAEON    Review of Systems   Constitutional:  Negative for fatigue and fever.   HENT:  Negative for congestion and sore throat.    Eyes:  Negative for visual disturbance.   Respiratory:  Negative for apnea, shortness of breath and wheezing.    Cardiovascular:  Negative for chest pain.   Gastrointestinal:  Positive for abdominal pain and diarrhea. Negative for nausea and vomiting.   Genitourinary:  Negative for dysuria.   Musculoskeletal:  Negative for arthralgias and back pain.   Neurological:  Negative for dizziness and headaches.   Psychiatric/Behavioral: Negative.     Objective:     Vital Signs (Most Recent):  Temp: 100.2 °F (37.9 °C) (01/22/23 0900)  Pulse: 87 (01/22/23 0900)  Resp: 18 (01/22/23 0900)  BP: (!) 147/69 (01/22/23 0900)  SpO2: 97 % (01/22/23 0900)   Vital Signs (24h Range):  Temp:  [97.6 °F (36.4 °C)-100.2 °F (37.9 °C)] 100.2 °F (37.9 °C)  Pulse:  [82-99] 87  Resp:  [18-33] 18  SpO2:  [93 %-97 %] 97 %  BP: (131-169)/(65-77) 147/69     Weight: 59 kg (130 lb 1.1 oz)  Body mass index is 21.64 kg/m².    Intake/Output Summary (Last 24 hours) at 1/22/2023 1032  Last data filed at 1/22/2023 0631  Gross per 24 hour   Intake 1762.23 ml   Output 4090 ml   Net -2327.77 ml      Physical Exam  HENT:      Head: Normocephalic.      Comments: Contusion over left eye     Right Ear: External ear normal.      Left Ear: External ear normal.      Nose: Nose normal.      Mouth/Throat:      Mouth: Mucous membranes are moist.   Eyes:      Pupils: Pupils are equal, round, and reactive to light.    Cardiovascular:      Rate and Rhythm: Normal rate.      Pulses: Normal pulses.   Pulmonary:      Effort: Pulmonary effort is normal.   Abdominal:      General: Abdomen is flat.      Tenderness: There is abdominal tenderness.      Comments: Laparotomy and Ostomy sites clear clean and intact  Profuse liquid output per ileostomy    Skin:     General: Skin is warm.   Neurological:      Mental Status: She is alert.      Comments: Increased attention, and focus  Follows all commands     Psychiatric:      Comments: Some confusion and possible delusions  States that she         Significant Labs: All pertinent labs within the past 24 hours have been reviewed.  BMP:   Recent Labs   Lab 23  0342   *   *   K 4.7   CL 94*   CO2 23   BUN 39*   CREATININE 3.5*   CALCIUM 8.1*   MG 2.0     CBC:   Recent Labs   Lab 23  0436 23  034   WBC 45.98* 43.91*   HGB 8.8* 8.3*   HCT 25.5* 24.4*    248     CMP:   Recent Labs   Lab 23  0436 23  0342   * 126*   K 4.9 4.7   CL 97 94*   CO2 22* 23   GLU 75 139*   BUN 72* 39*   CREATININE 4.7* 3.5*   CALCIUM 7.7* 8.1*   PROT 5.4* 5.6*   ALBUMIN 2.0* 1.9*   BILITOT 0.9 0.7   ALKPHOS 113 116   * 113*   * 479*   ANIONGAP 8 9       Significant Imaging: I have reviewed all pertinent imaging results/findings within the past 24 hours.      Assessment/Plan:      * Peritonitis    Surgery s/p colon resection revealed no evidence of mass  S/P EX lap to stop abdomen artery bleed  No evidence of prior CV disease   Etiology unknown  Bladder pressure wnl  ABG ph 7.319 PCO2 30.7, O2 74 Bicarb 15.8  NG removed       Plan:  Gen surg consulted. Appreciate recs  - Advance diet to mechanical soft gluten free  - Keep Mg 2, K 4  - Zofran prn for n/v       Acute hypoxemic respiratory failure  Resolved    ATN (acute tubular necrosis)  Likely 2/2 to shock  BUN/Cr: 58/1.7 > 60/1.9  UOP: minimal despite aggressive IVF  - HD on today 23  - UOP  115 in 24 hr  -Worsened BUN 62/4.1  -Phos 8.5  RIJ temp HD line on 1/14  Still anuric     Plan  -Strict I&Os,maintain euvolemia   -Avoid renal toxic meds   -Continue to monitor renal status and urine output   -Nephro consulted. Appreciate recs. Plan for continued HD   -Bladder scans QShift      Large bowel ischemia  #Gram negative Bacteremia  -CT Abd shows possible hematoma but no signs fo acute infection or abscess   - Gram negative rods reported in Blood culture from 1/13  WBC improved to 43 form 45 1/22/23  Plan  - Continue Cefepime, flagyl for total of 14 day course End date 1/27/23  -  All further cultures on 1/17 w/ NGT  - No tylenol to see if patient fevers, unless needed for pain       Type 2 diabetes mellitus with diabetic polyneuropathy, without long-term current use of insulin  SSI  Detemir 7U nightly  POCT BG QID with meals      VTE Risk Mitigation (From admission, onward)         Ordered     heparin (porcine) injection 5,000 Units  Every 8 hours         01/18/23 1253     heparin (porcine) injection 2,400 Units  As needed (PRN)         01/17/23 1228     IP VTE HIGH RISK PATIENT  Once         01/14/23 0843     Place sequential compression device  Until discontinued         01/14/23 0843                Discharge Planning   CAMPBELL:      Code Status: Full Code   Is the patient medically ready for discharge?:     Reason for patient still in hospital (select all that apply): Patient trending condition, Treatment, Consult recommendations and Pending disposition  Discharge Plan A: Long-term acute care facility (LTAC)   Discharge Delays: None known at this time          Bal Burr MD  [facility] PGY-2  01/22/2023

## 2023-01-22 NOTE — PROGRESS NOTES
[facility] Progress Note    Subjective:      PatientSitting up in bed with family present, states she feels much better today than she did yesterday and the day previously, nurses report no acute events since last evaluation.    Objective:   Last 24 Hour Vital Signs:  BP  Min: 131/65  Max: 169/77  Temp  Av.7 °F (37.1 °C)  Min: 97.6 °F (36.4 °C)  Max: 100.2 °F (37.9 °C)  Pulse  Av.3  Min: 82  Max: 99  Resp  Av.9  Min: 18  Max: 33  SpO2  Av.4 %  Min: 93 %  Max: 97 %  I/O last 3 completed shifts:  In: 2541.7 [P.O.:1500; Other:500; IV Piggyback:541.7]  Out: 4540 [Other:2500; Stool:]    Physical Examination:  Gen: awake, not conversant   HEENT: normocephalic, EOMI, MMM  CV: RRR, no murmurs   Lungs: CTAB, symmetric chest rise  GI: Soft, tenderness resolving   Extrem: no edema of LE or UE  Skin: dry, warm, intact.       Laboratory:  Recent Labs   Lab 23  0549 23  0436 23  0342   WBC 32.80* 45.98* 43.91*   HGB 9.3* 8.8* 8.3*   HCT 27.6* 25.5* 24.4*    172 248   * 127* 126*   K 4.7 4.9 4.7    97 94*   CREATININE 3.9* 4.7* 3.5*   BUN 53* 72* 39*   CO2 24 22* 23   ALT 1,031* 720* 479*   * 233* 113*     Radiology:  Imaging has been reviewed personally.       Assessment and Plan:     Lily Green is a 72 y.o. woman with a history of celiac disease, DM2 well controlled, HTN, anxiety presented after having a fall. Noted to have severe lactic acidosis with imaging concerning for ischemic colitis. Became hypotensive and started on levophed and vasopressin for shock. On arrival to [place] taken to OR for colectomy on . UOP severely diminished and essentially became anuric with only 50ml UOP. [facility] consulted for severe metabolic acidosis with pH 7.14 and HINA in setting of septic shock and ischemic colitis. HD initiated  and had one session with improvement in acidosis. Developed hemorrhagic shock with bleeding into the abdomen  and  taken back to OR for exlap and ileostomy creation. CRRT initiated 1/16, now off pressors and back on iHD.      Stage III Anuric HINA  - baseline creatinine 0.8  - presenting in shock on pressors with ischemic colitis.  Colectomy 1/14 but  Continues to have refractory acidosis and minimal UOP. On 1/16 developed hemorrhagic shock causing another ischemic insult.  - ATN with 2 separate insults will likely take several weeks to months to recover  - HD initiated 1/14; CRRT initiated 1/16 and discontinued 1/17; iHD resumed 1/17  - remains anuric  - HD MWF  Schedule  - renally dose all medications      Anion Gap Metabolic Acidosis  - improved with HD     Hyponatremia  -  most consistent with hypervolemia, improving with HD     Hyperphosphatemia  - phos low after HD now, today 2.5, binder not clinically indicated. Prescribed diabetosource tube feeds; does not need renal diet      Hypocalcemia  - Calcium  7.7,corrects to  9.3 for hypoalbuminemia       Thank you for allowing us to participate in the care of this patient. Please call with any questions.       Alex Jones, DO  [facility]

## 2023-01-22 NOTE — PT/OT/SLP PROGRESS
"Speech Language Pathology Treatment    Patient Name:  Lily Green   MRN:  [id number]  Admitting Diagnosis: Peritonitis    Recommendations:                 General Recommendations:  Dysphagia therapy  Diet recommendations:  Mechanical soft, Liquid Diet Level: Thin   Aspiration Precautions: 1 bite/sip at a time, Avoid talking while eating, Eliminate distractions, Feed only when awake/alert, HOB to 90 degrees, Remain upright 30 minutes post meal, Small bites/sips, and Standard aspiration precautions   General Precautions: Standard, aspiration, dental soft  Communication strategies:  none    Subjective     "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   Patient goals: "to have a good day"     Pain/Comfort:  Pain Rating 1: 0/10    Respiratory Status: Room air    Objective:     Has the patient been evaluated by SLP for swallowing?   Yes  Keep patient NPO? No   Current Respiratory Status:    room air    ST provided dysphagia tx this visit after confirming with ARABELLA White. Patient with full-liquid breakfast tray at side, most containers empty. Patient reported breakfast as "delicious." ST provided patient with 4 oz applesauce and 4 ounces diced peaches. Pt fed self via tsp bites and consumed all consistencies without demonstrating sign/symptoms of aspiration. Pt denied odynophagia or globus sensation.     Assessment:     Lily Green is a 72 y.o. female with an SLP diagnosis of Dysphagia.  She presents with improved oropharyngeal skills. Recommend mech soft/thin. Whole pills OK. ST notified patient, ARABELLA White, and Dr. Albarran of these recommendations. All acknowledged understanding. Pt needs reinforcement.     Goals:   Multidisciplinary Problems       SLP Goals          Problem: SLP    Goal Priority Disciplines Outcome   SLP Goal     SLP Ongoing, Progressing   Description: Short Term Goals:  1. Pt will " participate in swallow eval to determine safest diet level   2. Pt will tolerate clear liquid diet with no audible dysphagia signs.                        Plan:     Patient to be seen:  3 x/week   Plan of Care expires:  02/17/23  Plan of Care reviewed with:  patient   SLP Follow-Up:  Yes       Discharge recommendations:  LTACH (long-term acute care hospital) (post acute)   Barriers to Discharge:  None    Time Tracking:     SLP Treatment Date:   01/22/23  Speech Start Time:  0935  Speech Stop Time:  1000     Speech Total Time (min):  25 min    Billable Minutes: Treatment Swallowing Dysfunction 15; Self care 10    01/22/2023

## 2023-01-22 NOTE — HPI
Patient is a 71 y/o female with PMHx of diabetes type 2 (treated with metformin), HTN, and celiac disease (following a strict diet) who presented to OSH after falling and injuring her face while getting out of bed. The patient reported experiencing nausea, vomiting, diarrhea, abdominal pain, and weakness starting the day before. She has had a weight loss of 60 pounds over an extended period of time, which may have been partially caused by her diet. The patient's review of systems was otherwise normal. Of note patient is currently taking Lyrica, which has recently been increased. On evaluation at bedside, patient feels good just with mild abdominal pain. No other symptoms. Denies LOC with falls, does not remember if she hit her head.     ED Course: Presented hypotensive and tachycardic. Labs showed elevated WBC of 26.16, Glucose 269, , LA 7, and Beta-hydroxybutyrate 0.6. She was acidotic with ph of 7.282 and AG 21. Rest of the labs unremarkable. CT ab/pel revealed distention of the colon with a large amount of stool and fluid, and a segment of the colon that appeared narrowed with possible thickening of the wall, strongly suspected to be a colonic mass. S/p enema and disimpaction. The patient was given fluids (3 L) and antibiotics and transferred for further evaluation and possible surgery

## 2023-01-22 NOTE — ASSESSMENT & PLAN NOTE
#Gram negative Bacteremia  -CT Abd shows possible hematoma but no signs fo acute infection or abscess   - Gram negative rods reported in Blood culture from 1/13  WBC improved to 43 form 45 1/22/23  Plan  - Continue Cefepime, flagyl for total of 14 day course End date 1/27/23  -  All further cultures on 1/17 w/ NGT  - No tylenol to see if patient fevers, unless needed for pain

## 2023-01-22 NOTE — ASSESSMENT & PLAN NOTE
Patient in renal failure 2/2 ATN  Receiving intermittent HD  Plan  Follow nephro Recs  Fluid restriction

## 2023-01-22 NOTE — ASSESSMENT & PLAN NOTE
Surgery s/p colon resection revealed no evidence of mass  S/P EX lap to stop abdomen artery bleed  No evidence of prior CV disease   Etiology unknown  Bladder pressure wnl  ABG ph 7.319 PCO2 30.7, O2 74 Bicarb 15.8  NG removed 1/20      Plan:  Gen surg consulted. Appreciate recs  - Advance diet to mechanical soft gluten free  - Keep Mg 2, K 4  - Zofran prn for n/v

## 2023-01-22 NOTE — PROGRESS NOTES
[facility] Medicine  Progress Note    Patient Name: Lily Green  MRN: [id number]  Patient Class: IP- Inpatient   Admission Date: 1/13/2023  Length of Stay: 9 days  Attending Physician: Eduar Albarran III, MD  Primary Care Provider: Pavel Calixto MD        Subjective:     Principal Problem:Peritonitis        HPI:  Patient is a 71 y/o female with PMHx of diabetes type 2 (treated with metformin), HTN, and celiac disease (following a strict diet) who presented to OSH after falling and injuring her face while getting out of bed. The patient reported experiencing nausea, vomiting, diarrhea, abdominal pain, and weakness starting the day before. She has had a weight loss of 60 pounds over an extended period of time, which may have been partially caused by her diet. The patient's review of systems was otherwise normal. Of note patient is currently taking Lyrica, which has recently been increased. On evaluation at bedside, patient feels good just with mild abdominal pain. No other symptoms. Denies LOC with falls, does not remember if she hit her head.     ED Course: Presented hypotensive and tachycardic. Labs showed elevated WBC of 26.16, Glucose 269, , LA 7, and Beta-hydroxybutyrate 0.6. She was acidotic with ph of 7.282 and AG 21. Rest of the labs unremarkable. CT ab/pel revealed distention of the colon with a large amount of stool and fluid, and a segment of the colon that appeared narrowed with possible thickening of the wall, strongly suspected to be a colonic mass. S/p enema and disimpaction. The patient was given fluids (3 L) and antibiotics and transferred for further evaluation and possible surgery      Overview/Hospital Course:  No notes on file    Interval History: NAEON    Review of Systems   Constitutional:  Negative for fatigue and fever.   HENT:  Negative for congestion and sore throat.    Eyes:  Negative for visual disturbance.   Respiratory:  Negative for apnea, shortness of  breath and wheezing.    Cardiovascular:  Negative for chest pain.   Gastrointestinal:  Positive for abdominal pain and diarrhea. Negative for nausea and vomiting.   Genitourinary:  Negative for dysuria.   Musculoskeletal:  Negative for arthralgias and back pain.   Neurological:  Negative for dizziness and headaches.   Psychiatric/Behavioral: Negative.     Objective:     Vital Signs (Most Recent):  Temp: 100.2 °F (37.9 °C) (23 09)  Pulse: 87 (23 0900)  Resp: 18 (23)  BP: (!) 147/69 (23)  SpO2: 97 % (23)   Vital Signs (24h Range):  Temp:  [97.6 °F (36.4 °C)-100.2 °F (37.9 °C)] 100.2 °F (37.9 °C)  Pulse:  [82-99] 87  Resp:  [18-33] 18  SpO2:  [93 %-97 %] 97 %  BP: (131-169)/(65-77) 147/69     Weight: 59 kg (130 lb 1.1 oz)  Body mass index is 21.64 kg/m².    Intake/Output Summary (Last 24 hours) at 2023 1108  Last data filed at 2023 0631  Gross per 24 hour   Intake 1522.23 ml   Output 4090 ml   Net -2567.77 ml      Physical Exam  HENT:      Head: Normocephalic.      Comments: Contusion over left eye     Right Ear: External ear normal.      Left Ear: External ear normal.      Nose: Nose normal.      Mouth/Throat:      Mouth: Mucous membranes are moist.   Eyes:      Pupils: Pupils are equal, round, and reactive to light.   Cardiovascular:      Rate and Rhythm: Normal rate.      Pulses: Normal pulses.   Pulmonary:      Effort: Pulmonary effort is normal.   Abdominal:      General: Abdomen is flat.      Tenderness: There is abdominal tenderness.      Comments: Laparotomy and Ostomy sites clear clean and intact  Profuse liquid output per ileostomy    Skin:     General: Skin is warm.   Neurological:      Mental Status: She is alert.      Comments: Increased attention, and focus  Follows all commands     Psychiatric:      Comments: Some confusion and possible delusions  States that she         Significant Labs: All pertinent labs within the past 24 hours have been  reviewed.  BMP:   Recent Labs   Lab 01/22/23  0342   *   *   K 4.7   CL 94*   CO2 23   BUN 39*   CREATININE 3.5*   CALCIUM 8.1*   MG 2.0     CBC:   Recent Labs   Lab 01/21/23  0436 01/22/23  0342   WBC 45.98* 43.91*   HGB 8.8* 8.3*   HCT 25.5* 24.4*    248     CMP:   Recent Labs   Lab 01/21/23  0436 01/22/23  0342   * 126*   K 4.9 4.7   CL 97 94*   CO2 22* 23   GLU 75 139*   BUN 72* 39*   CREATININE 4.7* 3.5*   CALCIUM 7.7* 8.1*   PROT 5.4* 5.6*   ALBUMIN 2.0* 1.9*   BILITOT 0.9 0.7   ALKPHOS 113 116   * 113*   * 479*   ANIONGAP 8 9       Significant Imaging: I have reviewed all pertinent imaging results/findings within the past 24 hours.      Assessment/Plan:      * Peritonitis    Surgery s/p colon resection revealed no evidence of mass  S/P EX lap to stop abdomen artery bleed  No evidence of prior CV disease   Etiology unknown  Bladder pressure wnl  ABG ph 7.319 PCO2 30.7, O2 74 Bicarb 15.8  NG removed 1/20      Plan:  Gen surg consulted. Appreciate recs  - Advance diet to mechanical soft gluten free  - Keep Mg 2, K 4  - Zofran prn for n/v       Hyponatremia  Patient in renal failure 2/2 ATN  Receiving intermittent HD  Plan  Follow nephro Recs  Fluid restriction      Acute hypoxemic respiratory failure  Resolved    ATN (acute tubular necrosis)  Likely 2/2 to shock  BUN/Cr: 58/1.7 > 60/1.9  UOP: minimal despite aggressive IVF  - HD on today 1/19/23  -  in 24 hr  -Worsened BUN 62/4.1  -Phos 8.5  RIJ temp HD line on 1/14  Still anuric     Plan  -Strict I&Os,maintain euvolemia   -Avoid renal toxic meds   -Continue to monitor renal status and urine output   -Nephro consulted. Appreciate recs. Plan for continued HD   -Bladder scans QShift      Large bowel ischemia  #Gram negative Bacteremia  -CT Abd shows possible hematoma but no signs fo acute infection or abscess   - Gram negative rods reported in Blood culture from 1/13  WBC improved to 43 form 45 1/22/23  Plan  -  Continue Cefepime, flagyl for total of 14 day course End date 1/27/23  -  All further cultures on 1/17 w/ NGT  - No tylenol to see if patient fevers, unless needed for pain       Type 2 diabetes mellitus with diabetic polyneuropathy, without long-term current use of insulin  SSI  Detemir 7U nightly  POCT BG QID with meals      VTE Risk Mitigation (From admission, onward)         Ordered     heparin (porcine) injection 5,000 Units  Every 8 hours         01/18/23 1253     heparin (porcine) injection 2,400 Units  As needed (PRN)         01/17/23 1228     IP VTE HIGH RISK PATIENT  Once         01/14/23 0843     Place sequential compression device  Until discontinued         01/14/23 0843                Discharge Planning   CAMPBELL:      Code Status: Full Code   Is the patient medically ready for discharge?:     Reason for patient still in hospital (select all that apply): Patient trending condition, Laboratory test, Treatment, Consult recommendations, PT / OT recommendations and Pending disposition  Discharge Plan A: Long-term acute care facility (LTAC)   Discharge Delays: None known at this time              Bal Burr MD  [facility] PGY-2  01/22/2023

## 2023-01-22 NOTE — PROGRESS NOTES
[facility] Surg  General Surgery  Progress Note    Subjective:     History of Present Illness:  No notes on file    Post-Op Info:  Procedure(s) (LRB):  LAPAROTOMY, EXPLORATORY (N/A)  CLOSURE, COLOSTOMY (Left)  CREATION, COLOSTOMY (Right)   6 Days Post-Op     Interval History:   Patient seen and examined.  NAEON.  Transferred to floor yesterday.  Has more energy today.  Worked well with PT yesterday.     Medications:  Continuous Infusions:  Scheduled Meds:   sodium chloride 0.9%   Intravenous Once    ceFEPime (MAXIPIME) IVPB  2 g Intravenous Q24H    heparin (porcine)  5,000 Units Subcutaneous Q8H    metronidazole  500 mg Intravenous Q8H     PRN Meds:acetaminophen, dextrose 10%, dextrose 10%, dextrose 10%, dicyclomine, glucagon (human recombinant), glucose, glucose, heparin (porcine), hydrALAZINE, insulin aspart U-100, LIDOcaine, melatonin, ondansetron, promethazine, sodium chloride 0.9%, sodium chloride 0.9%     Review of patient's allergies indicates:   Allergen Reactions    Crestor [rosuvastatin] Swelling    Gluten protein      Objective:     Vital Signs (Most Recent):  Temp: 98.9 °F (37.2 °C) (01/22/23 1317)  Pulse: 82 (01/22/23 1325)  Resp: 18 (01/22/23 1325)  BP: (!) 145/67 (01/22/23 1317)  SpO2: 97 % (01/22/23 1325)   Vital Signs (24h Range):  Temp:  [97.6 °F (36.4 °C)-100.2 °F (37.9 °C)] 98.9 °F (37.2 °C)  Pulse:  [82-96] 82  Resp:  [18-19] 18  SpO2:  [93 %-97 %] 97 %  BP: (131-169)/(65-77) 145/67     Weight: 59 kg (130 lb 1.1 oz)  Body mass index is 21.64 kg/m².    Intake/Output - Last 3 Shifts         01/20 0700  01/21 0659 01/21 0700 01/22 0659 01/22 0700  01/23 0659    P.O. 240 1500     Other  500     NG/      IV Piggyback 468.5 222.2     Total Intake(mL/kg) 1308.5 (22.2) 2222.2 (37.7)     Other  2500     Stool 875 1590     Total Output 875 4090     Net +433.5 -1867.8                    Physical Exam  Constitutional:       General: She is not in acute distress.     Appearance: She is not  ill-appearing or toxic-appearing.   Cardiovascular:      Rate and Rhythm: Normal rate and regular rhythm.   Pulmonary:      Effort: Pulmonary effort is normal. No respiratory distress.   Abdominal:      General: There is no distension.      Palpations: Abdomen is soft.      Tenderness: There is no abdominal tenderness. There is no guarding.      Hernia: No hernia is present.      Comments: Midline incision C/D/I with staples in place   LLQ incision C/D/I with staples in place   RLQ ostomy pink, patient and viable with air and stool in bag    Skin:     Capillary Refill: Capillary refill takes less than 2 seconds.   Neurological:      Mental Status: She is alert. Mental status is at baseline.       Significant Labs:  I have reviewed all pertinent lab results within the past 24 hours.  CBC:   Recent Labs   Lab 01/22/23  0342   WBC 43.91*   RBC 3.02*   HGB 8.3*   HCT 24.4*      MCV 81*   MCH 27.5   MCHC 34.0     CMP:   Recent Labs   Lab 01/22/23  0342   *   CALCIUM 8.1*   ALBUMIN 1.9*   PROT 5.6*   *   K 4.7   CO2 23   CL 94*   BUN 39*   CREATININE 3.5*   ALKPHOS 116   *   *   BILITOT 0.7       Significant Diagnostics:  I have reviewed all pertinent imaging results/findings within the past 24 hours.    Assessment/Plan:     Large bowel ischemia  73 yo female now s/p total abdominal colectomy with end ileostomy, takeback for bleeding on 1/16, now stable and off pressors    Advanced to mechanical soft diet   Leukocystosis slightly improved - CT abd/pelvis with nonspecific fluid collections without signs of organized abscess formation;  Findings consistent with recent laparotomy with washout  Echo planned per primary   Multimodal pain control  Continue PT/OT  Encourage IS use   Rest of care per primary             Ursula Avelar MD  General Surgery  [facility] Surg

## 2023-01-22 NOTE — NURSING
"Patient yelling out "call my ". Patient requesting  be called. Informed patient that it is 1am . Patient stating that she wants to call her  to 'come pick her up because she is dying" ensured patient she wa not dying. Patient with confused conversation. This nurse called patients , Deric. Patient spoke with . Safety ensured. Questions answered. IVANNA camera at bedside.  "

## 2023-01-22 NOTE — ASSESSMENT & PLAN NOTE
Likely 2/2 to shock  BUN/Cr: 58/1.7 > 60/1.9  UOP: minimal despite aggressive IVF  - HD on today 1/19/23  -  in 24 hr  -Worsened BUN 62/4.1  -Phos 8.5  RIJ temp HD line on 1/14  Still anuric    Plan  -Strict I&Os,maintain euvolemia   -Avoid renal toxic meds   -Continue to monitor renal status and urine output   -Nephro consulted. Appreciate recs. Plan for continued HD   -Bladder scans QShift  -Consult Gen surgery for Tunnel catheter placement  - Will need continued HD outpatient

## 2023-01-23 ENCOUNTER — PATIENT OUTREACH (OUTPATIENT)
Dept: ADMINISTRATIVE | Facility: OTHER | Age: 73
End: 2023-01-23
Payer: MEDICARE

## 2023-01-23 LAB
ALBUMIN SERPL BCP-MCNC: 1.8 G/DL (ref 3.5–5.2)
ALP SERPL-CCNC: 106 U/L (ref 55–135)
ALT SERPL W/O P-5'-P-CCNC: 331 U/L (ref 10–44)
ANION GAP SERPL CALC-SCNC: 8 MMOL/L (ref 8–16)
ANISOCYTOSIS BLD QL SMEAR: SLIGHT
AST SERPL-CCNC: 79 U/L (ref 10–40)
BACTERIA BLD CULT: NORMAL
BACTERIA BLD CULT: NORMAL
BASOPHILS # BLD AUTO: ABNORMAL K/UL (ref 0–0.2)
BASOPHILS NFR BLD: 0 % (ref 0–1.9)
BILIRUB SERPL-MCNC: 0.5 MG/DL (ref 0.1–1)
BUN SERPL-MCNC: 56 MG/DL (ref 8–23)
BURR CELLS BLD QL SMEAR: ABNORMAL
CALCIUM SERPL-MCNC: 8 MG/DL (ref 8.7–10.5)
CHLORIDE SERPL-SCNC: 94 MMOL/L (ref 95–110)
CO2 SERPL-SCNC: 21 MMOL/L (ref 23–29)
CREAT SERPL-MCNC: 5 MG/DL (ref 0.5–1.4)
DIFFERENTIAL METHOD: ABNORMAL
EOSINOPHIL # BLD AUTO: ABNORMAL K/UL (ref 0–0.5)
EOSINOPHIL NFR BLD: 0 % (ref 0–8)
ERYTHROCYTE [DISTWIDTH] IN BLOOD BY AUTOMATED COUNT: 19.1 % (ref 11.5–14.5)
EST. GFR  (NO RACE VARIABLE): 9 ML/MIN/1.73 M^2
FINAL PATHOLOGIC DIAGNOSIS: NORMAL
GLUCOSE SERPL-MCNC: 96 MG/DL (ref 70–110)
GROSS: NORMAL
HBV CORE AB SERPL QL IA: NORMAL
HBV SURFACE AB SER QL IA: NEGATIVE
HBV SURFACE AB SERPL IA-ACNC: 8 MIU/ML
HCT VFR BLD AUTO: 21.7 % (ref 37–48.5)
HGB BLD-MCNC: 7.5 G/DL (ref 12–16)
IMM GRANULOCYTES # BLD AUTO: ABNORMAL K/UL (ref 0–0.04)
IMM GRANULOCYTES NFR BLD AUTO: ABNORMAL % (ref 0–0.5)
LYMPHOCYTES # BLD AUTO: ABNORMAL K/UL (ref 1–4.8)
LYMPHOCYTES NFR BLD: 14 % (ref 18–48)
Lab: NORMAL
MAGNESIUM SERPL-MCNC: 2.2 MG/DL (ref 1.6–2.6)
MCH RBC QN AUTO: 27.8 PG (ref 27–31)
MCHC RBC AUTO-ENTMCNC: 34.6 G/DL (ref 32–36)
MCV RBC AUTO: 80 FL (ref 82–98)
MONOCYTES # BLD AUTO: ABNORMAL K/UL (ref 0.3–1)
MONOCYTES NFR BLD: 8 % (ref 4–15)
NEUTROPHILS NFR BLD: 78 % (ref 38–73)
NRBC BLD-RTO: 0 /100 WBC
OVALOCYTES BLD QL SMEAR: ABNORMAL
PHOSPHATE SERPL-MCNC: 3.2 MG/DL (ref 2.7–4.5)
PLATELET # BLD AUTO: 279 K/UL (ref 150–450)
PLATELET BLD QL SMEAR: ABNORMAL
PMV BLD AUTO: 10.4 FL (ref 9.2–12.9)
POCT GLUCOSE: 100 MG/DL (ref 70–110)
POCT GLUCOSE: 131 MG/DL (ref 70–110)
POCT GLUCOSE: 136 MG/DL (ref 70–110)
POCT GLUCOSE: 177 MG/DL (ref 70–110)
POIKILOCYTOSIS BLD QL SMEAR: SLIGHT
POLYCHROMASIA BLD QL SMEAR: ABNORMAL
POTASSIUM SERPL-SCNC: 4.7 MMOL/L (ref 3.5–5.1)
PROT SERPL-MCNC: 5.6 G/DL (ref 6–8.4)
RBC # BLD AUTO: 2.7 M/UL (ref 4–5.4)
SODIUM SERPL-SCNC: 123 MMOL/L (ref 136–145)
WBC # BLD AUTO: 32.12 K/UL (ref 3.9–12.7)

## 2023-01-23 PROCEDURE — 94761 N-INVAS EAR/PLS OXIMETRY MLT: CPT

## 2023-01-23 PROCEDURE — 94799 UNLISTED PULMONARY SVC/PX: CPT

## 2023-01-23 PROCEDURE — 97530 THERAPEUTIC ACTIVITIES: CPT

## 2023-01-23 PROCEDURE — 25000003 PHARM REV CODE 250

## 2023-01-23 PROCEDURE — 90935 HEMODIALYSIS ONE EVALUATION: CPT

## 2023-01-23 PROCEDURE — 83735 ASSAY OF MAGNESIUM: CPT | Performed by: STUDENT IN AN ORGANIZED HEALTH CARE EDUCATION/TRAINING PROGRAM

## 2023-01-23 PROCEDURE — 80053 COMPREHEN METABOLIC PANEL: CPT | Performed by: STUDENT IN AN ORGANIZED HEALTH CARE EDUCATION/TRAINING PROGRAM

## 2023-01-23 PROCEDURE — 11000001 HC ACUTE MED/SURG PRIVATE ROOM

## 2023-01-23 PROCEDURE — 85007 BL SMEAR W/DIFF WBC COUNT: CPT | Performed by: FAMILY MEDICINE

## 2023-01-23 PROCEDURE — 85027 COMPLETE CBC AUTOMATED: CPT | Performed by: FAMILY MEDICINE

## 2023-01-23 PROCEDURE — 86704 HEP B CORE ANTIBODY TOTAL: CPT | Performed by: STUDENT IN AN ORGANIZED HEALTH CARE EDUCATION/TRAINING PROGRAM

## 2023-01-23 PROCEDURE — 99900035 HC TECH TIME PER 15 MIN (STAT)

## 2023-01-23 PROCEDURE — 97535 SELF CARE MNGMENT TRAINING: CPT

## 2023-01-23 PROCEDURE — 63600175 PHARM REV CODE 636 W HCPCS: Performed by: STUDENT IN AN ORGANIZED HEALTH CARE EDUCATION/TRAINING PROGRAM

## 2023-01-23 PROCEDURE — 25000003 PHARM REV CODE 250: Performed by: STUDENT IN AN ORGANIZED HEALTH CARE EDUCATION/TRAINING PROGRAM

## 2023-01-23 PROCEDURE — S0030 INJECTION, METRONIDAZOLE: HCPCS

## 2023-01-23 PROCEDURE — 92526 ORAL FUNCTION THERAPY: CPT

## 2023-01-23 PROCEDURE — 25000003 PHARM REV CODE 250: Performed by: SURGERY

## 2023-01-23 PROCEDURE — 84100 ASSAY OF PHOSPHORUS: CPT | Performed by: STUDENT IN AN ORGANIZED HEALTH CARE EDUCATION/TRAINING PROGRAM

## 2023-01-23 PROCEDURE — 63600175 PHARM REV CODE 636 W HCPCS

## 2023-01-23 RX ORDER — SODIUM CHLORIDE 9 MG/ML
INJECTION, SOLUTION INTRAVENOUS ONCE
Status: COMPLETED | OUTPATIENT
Start: 2023-01-23 | End: 2023-01-26

## 2023-01-23 RX ORDER — LOPERAMIDE HYDROCHLORIDE 2 MG/1
2 CAPSULE ORAL 2 TIMES DAILY
Status: DISCONTINUED | OUTPATIENT
Start: 2023-01-23 | End: 2023-01-26 | Stop reason: HOSPADM

## 2023-01-23 RX ORDER — HYDROCODONE BITARTRATE AND ACETAMINOPHEN 5; 325 MG/1; MG/1
1 TABLET ORAL EVERY 6 HOURS PRN
Status: DISCONTINUED | OUTPATIENT
Start: 2023-01-23 | End: 2023-01-26 | Stop reason: HOSPADM

## 2023-01-23 RX ADMIN — METRONIDAZOLE 500 MG: 5 INJECTION, SOLUTION INTRAVENOUS at 06:01

## 2023-01-23 RX ADMIN — ACETAMINOPHEN 650 MG: 325 TABLET ORAL at 09:01

## 2023-01-23 RX ADMIN — INSULIN ASPART 2 UNITS: 100 INJECTION, SOLUTION INTRAVENOUS; SUBCUTANEOUS at 12:01

## 2023-01-23 RX ADMIN — HEPARIN SODIUM 5000 UNITS: 5000 INJECTION INTRAVENOUS; SUBCUTANEOUS at 05:01

## 2023-01-23 RX ADMIN — HEPARIN SODIUM 5000 UNITS: 5000 INJECTION INTRAVENOUS; SUBCUTANEOUS at 09:01

## 2023-01-23 RX ADMIN — LOPERAMIDE HYDROCHLORIDE 2 MG: 2 CAPSULE ORAL at 08:01

## 2023-01-23 RX ADMIN — METRONIDAZOLE 500 MG: 5 INJECTION, SOLUTION INTRAVENOUS at 01:01

## 2023-01-23 RX ADMIN — LOPERAMIDE HYDROCHLORIDE 2 MG: 2 CAPSULE ORAL at 09:01

## 2023-01-23 RX ADMIN — CEFEPIME 2 G: 2 INJECTION, POWDER, FOR SOLUTION INTRAVENOUS at 11:01

## 2023-01-23 RX ADMIN — METRONIDAZOLE 500 MG: 5 INJECTION, SOLUTION INTRAVENOUS at 09:01

## 2023-01-23 RX ADMIN — LIDOCAINE 1 PATCH: 50 PATCH CUTANEOUS at 06:01

## 2023-01-23 RX ADMIN — HYDROCODONE BITARTRATE AND ACETAMINOPHEN 1 TABLET: 5; 325 TABLET ORAL at 03:01

## 2023-01-23 NOTE — PROGRESS NOTES
01/23/23 1734   Vital Signs   Temp 98.2 °F (36.8 °C)   Temp src Temporal   Pulse 87   Heart Rate Source Monitor   Resp 16   Device (Oxygen Therapy) room air   /84   BP Location Right arm   BP Method Automatic   Patient Position Lying   Assessments (Pre/Post)   Consent Obtained yes   Date Hepatitis Profile Obtained 01/19/23   Transport Modality bed   Level of Consciousness (AVPU) alert   Pain   Preferred Pain Scale number (Numeric Rating Pain Scale)   Pain Body Location - Orientation incisional   Pain Body Location abdomen   Pain Rating (0-10): Rest 1   Pain Rating (0-10): Activity 1   Pain Reassessment   Pain Rating Post Med Admin 1   Pre-Hemodialysis Assessment   Additional Dialysis Information Needed Yes   Patient Status Arrived   Treatment Consent Verified Yes   Trialysis (Dialysis) Catheter 01/14/23 1420 right internal jugular   Placement Date/Time: 01/14/23 1420   Present Prior to Hospital Arrival?: No  Hand Hygiene: Performed  Barrier Precautions: Performed  Skin Antisepsis: ChloraPrep  Location: right internal jugular  Insertion attempts (enter comment if more than 2 attem...   Line Necessity Review CRRT/HD   Venous Patency/Care flushed w/o difficulty;heparin locked   Arterial Patency/Care flushed w/o difficulty;heparin locked   Post-Hemodialysis Assessment   Rinseback Volume (mL) 250 mL   Blood Volume Processed (Liters) 56.7 L   Dialyzer Clearance Moderately streaked   Duration of Treatment 180 minutes   Additional Fluid Intake (mL) 500 mL   Total UF (mL) 3000 mL   Net Fluid Removal 2500   Patient Response to Treatment tolerated well   Post-Hemodialysis Comments Lines disconnected. CVC flushed and locked per P and P. VSS. Denies complaints   Edema   Edema generalized;dependent

## 2023-01-23 NOTE — PLAN OF CARE
Problem: Adult Inpatient Plan of Care  Goal: Plan of Care Review  Outcome: Ongoing, Progressing  Goal: Patient-Specific Goal (Individualized)  Outcome: Ongoing, Progressing  Goal: Absence of Hospital-Acquired Illness or Injury  Outcome: Ongoing, Progressing  Goal: Optimal Comfort and Wellbeing  Outcome: Ongoing, Progressing  Goal: Readiness for Transition of Care  Outcome: Ongoing, Progressing     Problem: Diabetes Comorbidity  Goal: Blood Glucose Level Within Targeted Range  Outcome: Ongoing, Progressing     Problem: Infection  Goal: Absence of Infection Signs and Symptoms  Outcome: Ongoing, Progressing     Problem: Skin Injury Risk Increased  Goal: Skin Health and Integrity  Outcome: Ongoing, Progressing     Problem: Fall Injury Risk  Goal: Absence of Fall and Fall-Related Injury  Outcome: Ongoing, Progressing     Problem: Device-Related Complication Risk (Hemodialysis)  Goal: Safe, Effective Therapy Delivery  Outcome: Ongoing, Progressing     Problem: Hemodynamic Instability (Hemodialysis)  Goal: Effective Tissue Perfusion  Outcome: Ongoing, Progressing     Problem: Infection (Hemodialysis)  Goal: Absence of Infection Signs and Symptoms  Outcome: Ongoing, Progressing     Problem: Fluid and Electrolyte Imbalance (Acute Kidney Injury/Impairment)  Goal: Fluid and Electrolyte Balance  Outcome: Ongoing, Progressing     Problem: Oral Intake Inadequate (Acute Kidney Injury/Impairment)  Goal: Optimal Nutrition Intake  Outcome: Ongoing, Progressing     Problem: Renal Function Impairment (Acute Kidney Injury/Impairment)  Goal: Effective Renal Function  Outcome: Ongoing, Progressing     Problem: Device-Related Complication Risk (CRRT (Continuous Renal Replacement Therapy))  Goal: Safe, Effective Therapy Delivery  Outcome: Ongoing, Progressing     Problem: Hypothermia (CRRT (Continuous Renal Replacement Therapy))  Goal: Body Temperature Maintained in Desired Range  Outcome: Ongoing, Progressing     Problem: Infection (CRRT  (Continuous Renal Replacement Therapy))  Goal: Absence of Infection Signs and Symptoms  Outcome: Ongoing, Progressing     Problem: Adjustment to Illness (Delirium)  Goal: Optimal Coping  Outcome: Ongoing, Progressing     Problem: Altered Behavior (Delirium)  Goal: Improved Behavioral Control  Outcome: Ongoing, Progressing     Problem: Attention and Thought Clarity Impairment (Delirium)  Goal: Improved Attention and Thought Clarity  Outcome: Ongoing, Progressing     Problem: Sleep Disturbance (Delirium)  Goal: Improved Sleep  Outcome: Ongoing, Progressing     Problem: Adjustment to Surgery (Colostomy)  Goal: Optimal Coping  Outcome: Ongoing, Progressing     Problem: Bleeding (Colostomy)  Goal: Absence of Bleeding  Outcome: Ongoing, Progressing     Problem: Fluid, Electrolyte and Nutrition Imbalance (Colostomy)  Goal: Fluid, Electrolyte and Nutrition Balance  Outcome: Ongoing, Progressing     Problem: Infection (Colostomy)  Goal: Absence of Infection Signs and Symptoms  Outcome: Ongoing, Progressing     Problem: Ongoing Anesthesia Effects (Colostomy)  Goal: Anesthesia/Sedation Recovery  Outcome: Ongoing, Progressing     Problem: Pain (Colostomy)  Goal: Acceptable Pain Control  Outcome: Ongoing, Progressing     Problem: Postoperative Nausea and Vomiting (Colostomy)  Goal: Nausea and Vomiting Relief  Outcome: Ongoing, Progressing     Problem: Postoperative Stoma Care (Colostomy)  Goal: Optimal Stoma Healing  Outcome: Ongoing, Progressing     Problem: Postoperative Urinary Retention (Colostomy)  Goal: Effective Urinary Elimination  Outcome: Ongoing, Progressing     Problem: Respiratory Compromise (Colostomy)  Goal: Effective Oxygenation and Ventilation  Outcome: Ongoing, Progressing     Problem: Confusion Acute  Goal: Optimal Cognitive Function  Outcome: Ongoing, Progressing

## 2023-01-23 NOTE — PROGRESS NOTES
IP Liaison - Initial Visit Note    Patient: Lily Green  MRN:  [id number]  Date of Service:  1/23/2023  Completed by:  GIOVANNA Monroy    Reason for Visit   Patient presents with    IP Liaison Initial Visit       Patient at dialysis at time of visit, RSW met with patient family at bedside in order to complete SDOH questionnaire and liaison assessment.  Pt family has identified no immediate social barriers to care. Per pt family, pt is not in need of resources at this time. RSW will follow up with patient at a later time.    The following were addressed during this visit:  - Review SDOH Questions   - Complete patient assessment   - Complete initial visit with patient        Patient Summary     IP Liaison Patient Assessment    General  Level of Caregiver support: Member independent and does not need caregiver assistance  Have you had to make a decision between paying for any of the following in the last 2 months?: None  Employment status: Retired and not working  Assessments  Was the PHQ Depression Screening completed this visit?: No  Was the MARIXA-7 Screening completed this visit?: No       GIOVANNA Monroy

## 2023-01-23 NOTE — PROGRESS NOTES
[facility] Medicine  Progress Note    Patient Name: Lily Green  MRN: [id number]  Patient Class: IP- Inpatient   Admission Date: 1/13/2023  Length of Stay: 10 days  Attending Physician: Sylvia Dumont MD  Primary Care Provider: Pavel Calixto MD        Subjective:     Principal Problem:Peritonitis        HPI:  Patient is a 71 y/o female with PMHx of diabetes type 2 (treated with metformin), HTN, and celiac disease (following a strict diet) who presented to OSH after falling and injuring her face while getting out of bed. The patient reported experiencing nausea, vomiting, diarrhea, abdominal pain, and weakness starting the day before. She has had a weight loss of 60 pounds over an extended period of time, which may have been partially caused by her diet. The patient's review of systems was otherwise normal. Of note patient is currently taking Lyrica, which has recently been increased. On evaluation at bedside, patient feels good just with mild abdominal pain. No other symptoms. Denies LOC with falls, does not remember if she hit her head.     ED Course: Presented hypotensive and tachycardic. Labs showed elevated WBC of 26.16, Glucose 269, , LA 7, and Beta-hydroxybutyrate 0.6. She was acidotic with ph of 7.282 and AG 21. Rest of the labs unremarkable. CT ab/pel revealed distention of the colon with a large amount of stool and fluid, and a segment of the colon that appeared narrowed with possible thickening of the wall, strongly suspected to be a colonic mass. S/p enema and disimpaction. The patient was given fluids (3 L) and antibiotics and transferred for further evaluation and possible surgery      Overview/Hospital Course:  No notes on file    Interval History: Patient with worsened attention and mentation today    Review of Systems   Constitutional:  Negative for fatigue and fever.   HENT:  Negative for congestion and sore throat.    Eyes:  Negative for visual disturbance.    Respiratory:  Negative for apnea, shortness of breath and wheezing.    Cardiovascular:  Negative for chest pain.   Gastrointestinal:  Positive for abdominal pain and diarrhea. Negative for nausea and vomiting.   Genitourinary:  Negative for dysuria.   Musculoskeletal:  Negative for arthralgias and back pain.   Neurological:  Negative for dizziness and headaches.   Psychiatric/Behavioral: Negative.     Objective:     Vital Signs (Most Recent):  Temp: 99.4 °F (37.4 °C) (01/23/23 0732)  Pulse: 70 (01/23/23 0823)  Resp: 18 (01/23/23 0823)  BP: (!) 149/68 (01/23/23 0732)  SpO2: 97 % (01/23/23 0823)   Vital Signs (24h Range):  Temp:  [98.8 °F (37.1 °C)-99.4 °F (37.4 °C)] 99.4 °F (37.4 °C)  Pulse:  [67-83] 70  Resp:  [18-20] 18  SpO2:  [95 %-99 %] 97 %  BP: (132-149)/(60-70) 149/68     Weight: 61.6 kg (135 lb 12.9 oz)  Body mass index is 22.6 kg/m².    Intake/Output Summary (Last 24 hours) at 1/23/2023 1330  Last data filed at 1/23/2023 0507  Gross per 24 hour   Intake 500 ml   Output 1050 ml   Net -550 ml      Physical Exam  HENT:      Head: Normocephalic.      Comments: Contusion over left eye     Right Ear: External ear normal.      Left Ear: External ear normal.      Nose: Nose normal.      Mouth/Throat:      Mouth: Mucous membranes are moist.   Eyes:      Pupils: Pupils are equal, round, and reactive to light.   Cardiovascular:      Rate and Rhythm: Normal rate.      Pulses: Normal pulses.   Pulmonary:      Effort: Pulmonary effort is normal.   Abdominal:      General: Abdomen is flat.      Tenderness: There is abdominal tenderness.      Comments: Laparotomy and Ostomy sites clear clean and intact  Profuse liquid output per ileostomy    Skin:     General: Skin is warm.   Neurological:      Mental Status: She is alert.      Comments: Reduced attention, and focus  Follows all commands  Minimal responses to questions  Minimal problem solving or recall of previous day   Psychiatric:      Comments: Some confusion and  possible delusions         Significant Labs: All pertinent labs within the past 24 hours have been reviewed.  BMP:   Recent Labs   Lab 01/23/23  0459   GLU 96   *   K 4.7   CL 94*   CO2 21*   BUN 56*   CREATININE 5.0*   CALCIUM 8.0*   MG 2.2     CBC:   Recent Labs   Lab 01/22/23  0342 01/23/23  0459   WBC 43.91* 32.12*   HGB 8.3* 7.5*   HCT 24.4* 21.7*    279     CMP:   Recent Labs   Lab 01/22/23  0342 01/23/23  0459   * 123*   K 4.7 4.7   CL 94* 94*   CO2 23 21*   * 96   BUN 39* 56*   CREATININE 3.5* 5.0*   CALCIUM 8.1* 8.0*   PROT 5.6* 5.6*   ALBUMIN 1.9* 1.8*   BILITOT 0.7 0.5   ALKPHOS 116 106   * 79*   * 331*   ANIONGAP 9 8       Significant Imaging: I have reviewed all pertinent imaging results/findings within the past 24 hours.      Assessment/Plan:      * Peritonitis    Surgery s/p colon resection revealed no evidence of mass  S/P EX lap to stop abdomen artery bleed  No evidence of prior CV disease   Etiology unknown  Bladder pressure wnl  ABG ph 7.319 PCO2 30.7, O2 74 Bicarb 15.8  NG removed 1/20      Plan:  Gen surg consulted. Appreciate recs  - Advance diet to mechanical soft gluten free  - Keep Mg 2, K 4  - Zofran prn for n/v       Hyponatremia  Patient in renal failure 2/2 ATN  Receiving intermittent HD  Patient may have had excess PO fluids on 1/22/23  Plan  Follow nephro Recs  Fluid restriction  Will attempt 3L removal via Ultra filtration in HD         Acute hypoxemic respiratory failure  Resolved    ATN (acute tubular necrosis)  Likely 2/2 to shock  BUN/Cr: 58/1.7 > 60/1.9  UOP: minimal despite aggressive IVF  - HD on today 1/19/23  -  in 24 hr  -Worsened BUN 62/4.1  -Phos 8.5  RIJ temp HD line on 1/14  Still anuric    Plan  -Strict I&Os,maintain euvolemia   -Avoid renal toxic meds   -Continue to monitor renal status and urine output   -Nephro consulted. Appreciate recs. Plan for continued HD   -Bladder scans QShift  -Consult Gen surgery for Tunnel  catheter placement  - Will need continued HD outpatient     Large bowel ischemia  #Gram negative Bacteremia  -CT Abd shows possible hematoma but no signs fo acute infection or abscess   - Gram negative rods reported in Blood culture from 1/13  WBC improved to 43 form 45 1/22/23  Plan  - Continue Cefepime, flagyl for total of 14 day course End date 1/27/23  -  All further cultures on 1/17 w/ NGT  - No tylenol to see if patient fevers, unless needed for pain       Type 2 diabetes mellitus with diabetic polyneuropathy, without long-term current use of insulin  SSI  Detemir 7U nightly  POCT BG QID with meals      VTE Risk Mitigation (From admission, onward)         Ordered     heparin (porcine) injection 5,000 Units  Every 8 hours         01/18/23 1253     heparin (porcine) injection 2,400 Units  As needed (PRN)         01/17/23 1228     IP VTE HIGH RISK PATIENT  Once         01/14/23 0843     Place sequential compression device  Until discontinued         01/14/23 0843                Discharge Planning   CAMPBELL:      Code Status: Full Code   Is the patient medically ready for discharge?:     Reason for patient still in hospital (select all that apply): Patient trending condition, Laboratory test, Treatment, Consult recommendations and Pending disposition  Discharge Plan A: Long-term acute care facility (LTAC)   Discharge Delays: None known at this time            Bal Burr MD  [facility] PGY-2  01/23/2023

## 2023-01-23 NOTE — NURSING
More A and oriented x 4 today. Initiating conversations and able to use call light.Diet advanced to dysphagia mechanical soft, gluten free, fluid restrict. Tolerating well. Ostomy draining green liquid stool, requiring flushing to prevent backup. Fruit pieces occluding drainage. No C/O pain. IVANNA in room. Will continue to monitor.

## 2023-01-23 NOTE — SUBJECTIVE & OBJECTIVE
Interval History: Patient with worsened attention and mentation today    Review of Systems   Constitutional:  Negative for fatigue and fever.   HENT:  Negative for congestion and sore throat.    Eyes:  Negative for visual disturbance.   Respiratory:  Negative for apnea, shortness of breath and wheezing.    Cardiovascular:  Negative for chest pain.   Gastrointestinal:  Positive for abdominal pain and diarrhea. Negative for nausea and vomiting.   Genitourinary:  Negative for dysuria.   Musculoskeletal:  Negative for arthralgias and back pain.   Neurological:  Negative for dizziness and headaches.   Psychiatric/Behavioral: Negative.     Objective:     Vital Signs (Most Recent):  Temp: 99.4 °F (37.4 °C) (01/23/23 0732)  Pulse: 70 (01/23/23 0823)  Resp: 18 (01/23/23 0823)  BP: (!) 149/68 (01/23/23 0732)  SpO2: 97 % (01/23/23 0823)   Vital Signs (24h Range):  Temp:  [98.8 °F (37.1 °C)-99.4 °F (37.4 °C)] 99.4 °F (37.4 °C)  Pulse:  [67-83] 70  Resp:  [18-20] 18  SpO2:  [95 %-99 %] 97 %  BP: (132-149)/(60-70) 149/68     Weight: 61.6 kg (135 lb 12.9 oz)  Body mass index is 22.6 kg/m².    Intake/Output Summary (Last 24 hours) at 1/23/2023 1330  Last data filed at 1/23/2023 0507  Gross per 24 hour   Intake 500 ml   Output 1050 ml   Net -550 ml      Physical Exam  HENT:      Head: Normocephalic.      Comments: Contusion over left eye     Right Ear: External ear normal.      Left Ear: External ear normal.      Nose: Nose normal.      Mouth/Throat:      Mouth: Mucous membranes are moist.   Eyes:      Pupils: Pupils are equal, round, and reactive to light.   Cardiovascular:      Rate and Rhythm: Normal rate.      Pulses: Normal pulses.   Pulmonary:      Effort: Pulmonary effort is normal.   Abdominal:      General: Abdomen is flat.      Tenderness: There is abdominal tenderness.      Comments: Laparotomy and Ostomy sites clear clean and intact  Profuse liquid output per ileostomy    Skin:     General: Skin is warm.   Neurological:       Mental Status: She is alert.      Comments: Reduced attention, and focus  Follows all commands  Minimal responses to questions  Minimal problem solving or recall of previous day   Psychiatric:      Comments: Some confusion and possible delusions         Significant Labs: All pertinent labs within the past 24 hours have been reviewed.  BMP:   Recent Labs   Lab 01/23/23 0459   GLU 96   *   K 4.7   CL 94*   CO2 21*   BUN 56*   CREATININE 5.0*   CALCIUM 8.0*   MG 2.2     CBC:   Recent Labs   Lab 01/22/23  0342 01/23/23 0459   WBC 43.91* 32.12*   HGB 8.3* 7.5*   HCT 24.4* 21.7*    279     CMP:   Recent Labs   Lab 01/22/23  0342 01/23/23 0459   * 123*   K 4.7 4.7   CL 94* 94*   CO2 23 21*   * 96   BUN 39* 56*   CREATININE 3.5* 5.0*   CALCIUM 8.1* 8.0*   PROT 5.6* 5.6*   ALBUMIN 1.9* 1.8*   BILITOT 0.7 0.5   ALKPHOS 116 106   * 79*   * 331*   ANIONGAP 9 8       Significant Imaging: I have reviewed all pertinent imaging results/findings within the past 24 hours.

## 2023-01-23 NOTE — PLAN OF CARE
Problem: SLP  Goal: SLP Goal  Description: Short Term Goals:  1. Pt will participate in swallow eval to determine safest diet level   2. Pt will tolerate clear liquid diet with no audible dysphagia signs.   Outcome: Adequate for Care Transition   Needs encouragement to consume meal.

## 2023-01-23 NOTE — NURSING
Patient c/o burning pain on RLQ stoma. Next dose for tylenol is at 9:30pm, Dr Javier informed and put in order for norco PRN.

## 2023-01-23 NOTE — PT/OT/SLP PROGRESS
Occupational Therapy   Treatment    Name: Lily Green  MRN: [id number]  Admitting Diagnosis:  Peritonitis  7 Days Post-Op    Recommendations:     Discharge Recommendations: other (see comments) (post acute therapy in an inpatient setting (LTACH vs snf))  Discharge Equipment Recommendations:  other (see comments) (to be determined pending progression)  Barriers to discharge:  Other (Comment) (low activity tolerance; decreased independence in functional activity)    Assessment:     Lily Green is a 72 y.o. female with a medical diagnosis of Peritonitis.  She presents with ..The primary encounter diagnosis was Peritonitis [K65.9 (ICD-10-CM)]. Diagnoses of Submucosal colonic lesion, Shock, Large bowel ischemia, ATN (acute tubular necrosis), HINA (acute kidney injury), Acute hypoxemic respiratory failure, and Elevated lactic acid level were also pertinent to this visit.  . Performance deficits affecting function are weakness, impaired endurance, impaired cognition, impaired sensation, impaired self care skills, impaired functional mobility, gait instability, impaired balance, pain, decreased safety awareness, impaired skin, decreased coordination.     Rehab Prognosis:  Good; patient would benefit from acute skilled OT services to address these deficits and reach maximum level of function.       Plan:     Patient to be seen 3 x/week to address the above listed problems via self-care/home management, therapeutic activities, therapeutic exercises, neuromuscular re-education  Plan of Care Expires: 02/15/23  Plan of Care Reviewed with: patient, sibling    Subjective     Pain/Comfort:  Pain Rating 1: other (see comments) (unrated;but endorses discomfort at ostomy site and nerve discomfort in B feet)  Pain Addressed 1: Pre-medicate for activity, Reposition, Cessation of Activity, Distraction, Nurse notified  Pain Rating Post-Intervention 1: other (see comments) (indicates increased pain in abdomen/ ostomy  area with movement)    Objective:     Communicated with: nursing prior to session.  Patient found HOB elevated with bed alarm, Other (comments) (dialysis catheter) upon OT entry to room.    General Precautions: Standard, fall    Orthopedic Precautions:N/A  Braces: N/A  Respiratory Status: Room air     Occupational Performance:     Bed Mobility:    Patient completed Rolling/Turning to Left with  moderate assistance   Patient completed Rolling/Turning to Right with moderate assistance  Patient completed Supine to Sit with moderate assistance  Patient completed Sit to Supine with moderate assistance   During all bed mobility, provided increased time, verbal/visual cues for hand placement on bed rails with guidance to avoid excessive abdominal straining (to prevent excess pressure on ostomy    Functional Mobility/Transfers:  Patient completed Sit <> Stand Transfer with minimum assistance  with  hand-held assist   Functional Mobility: sat eob unsupported for ~10 minutes with intermittent CGA for balance; minimal side steps toward head of bed with min assist of therapist, verbal / visual cues to direct gaze therapist to reduce excess cervical flexion and to maximize comfort    Activities of Daily Living:  Grooming: sitting unsupported - verbal initiation problem solving cues; CGA for reaching       [facility] 6 Click ADL: 13    Treatment & Education:  Patient/patient's sister educated on role of OT. Patient with increased time A&O x4. Patient with fixation on pain. Therapist facilitated appropriate therpeutic use of self and client factor of spirituality. Incorporated continued from prior session use of positive affirmations with reciprocation. Instructed patient on brief guided imagery for pain management followed by functional mobility / ADL as above. At end of session, guided for return to supine HOB elevated in prep for dialysis transport. No further questions.     Patient left HOB elevated with all lines intact, call  button in reach, bed alarm on, nursing notified, and transport present    GOALS:   Multidisciplinary Problems       Occupational Therapy Goals          Problem: Occupational Therapy    Goal Priority Disciplines Outcome Interventions   Occupational Therapy Goal     OT, PT/OT Ongoing, Progressing    Description: Goals to be met by: 2/15/2023     Patient will increase functional independence with ADLs by performing:    Feeding with min assist and incorporation of upright seated posture with incorporation of conservation techniques  UE Dressing with Moderate Assistance with easy on overhead clothing  Grooming while seated with Minimal Assistance.  Rolling bilaterally with moderate assist to increased ability to self-reposition to decrease pressure injury and to decrease burden of care during pericare; goal met; upgrade to min assist  Sitting eob with min assist x8 minutes without adverse vital signs; goal met; update to SBA  Bed < > BSC transfer with moderate assist via least restrictive method  Increase BUE MMT grossly to 3+/5 to integrate dominant UE into daily living tasks                         Time Tracking:     OT Date of Treatment: 01/23/23  OT Start Time: 1328  OT Stop Time: 1406  OT Total Time (min): 38 min    Billable Minutes:Self Care/Home Management 8 min  Therapeutic Activity 20 min    OT/RICHARD: OT          1/23/2023

## 2023-01-23 NOTE — SUBJECTIVE & OBJECTIVE
Interval History:   No acute issues overnight. Afebrile. VSS  Pain controlled well  no nausea / vomiting  (+) bowel function, 500cc in last 24 hrs      Medications:  Continuous Infusions:  Scheduled Meds:   sodium chloride 0.9%   Intravenous Once    ceFEPime (MAXIPIME) IVPB  2 g Intravenous Q24H    heparin (porcine)  5,000 Units Subcutaneous Q8H    metronidazole  500 mg Intravenous Q8H     PRN Meds:acetaminophen, dextrose 10%, dextrose 10%, dextrose 10%, dicyclomine, glucagon (human recombinant), glucose, glucose, heparin (porcine), hydrALAZINE, insulin aspart U-100, LIDOcaine, melatonin, ondansetron, promethazine, sodium chloride 0.9%, sodium chloride 0.9%     Review of patient's allergies indicates:   Allergen Reactions    Crestor [rosuvastatin] Swelling    Gluten protein      Objective:     Vital Signs (Most Recent):  Temp: 98.8 °F (37.1 °C) (01/23/23 0307)  Pulse: 74 (01/23/23 0307)  Resp: 20 (01/23/23 0307)  BP: (!) 147/70 (01/23/23 0307)  SpO2: 97 % (01/23/23 0307)   Vital Signs (24h Range):  Temp:  [98.8 °F (37.1 °C)-100.2 °F (37.9 °C)] 98.8 °F (37.1 °C)  Pulse:  [74-89] 74  Resp:  [18-20] 20  SpO2:  [95 %-97 %] 97 %  BP: (132-147)/(60-70) 147/70     Weight: 61.6 kg (135 lb 12.9 oz)  Body mass index is 22.6 kg/m².    Intake/Output - Last 3 Shifts         01/21 0700  01/22 0659 01/22 0700  01/23 0659    P.O. 1500 500    Other 500     IV Piggyback 222.2     Total Intake(mL/kg) 2222.2 (37.7) 500 (8.1)    Urine (mL/kg/hr)  0 (0)    Other 2500     Stool 1590 1050    Total Output 4090 1050    Net -1867.8 -550          Stool Occurrence  1 x            Physical Exam  Constitutional:       General: She is not in acute distress.     Appearance: She is not ill-appearing or toxic-appearing.   Cardiovascular:      Rate and Rhythm: Normal rate and regular rhythm.   Pulmonary:      Effort: Pulmonary effort is normal. No respiratory distress.   Abdominal:      General: There is no distension.      Palpations: Abdomen is  soft.      Tenderness: There is no abdominal tenderness. There is no guarding.      Hernia: No hernia is present.      Comments: Midline incision C/D/I with staples in place   LLQ incision C/D/I with staples in place   RLQ ostomy pink, patient and viable with air and stool in bag    Skin:     Capillary Refill: Capillary refill takes less than 2 seconds.   Neurological:      Mental Status: She is alert. Mental status is at baseline.       Significant Labs:  I have reviewed all pertinent lab results within the past 24 hours.  CBC:   Recent Labs   Lab 01/23/23 0459   WBC 32.12*   RBC 2.70*   HGB 7.5*   HCT 21.7*      MCV 80*   MCH 27.8   MCHC 34.6     CMP:   Recent Labs   Lab 01/23/23 0459   GLU 96   CALCIUM 8.0*   ALBUMIN 1.8*   PROT 5.6*   *   K 4.7   CO2 21*   CL 94*   BUN 56*   CREATININE 5.0*   ALKPHOS 106   *   AST 79*   BILITOT 0.5       Significant Diagnostics:  I have reviewed all pertinent imaging results/findings within the past 24 hours.

## 2023-01-23 NOTE — PROGRESS NOTES
[facility] Progress Note    Subjective:      Ms. Green is feeling ok today. Breathing appears more labored this morning but she does not complain of this. She is not very conversant but does participate in exam and nods in understanding for dialysis today.      Objective:   Last 24 Hour Vital Signs:  BP  Min: 132/69  Max: 149/68  Temp  Av °F (37.2 °C)  Min: 98.8 °F (37.1 °C)  Max: 99.4 °F (37.4 °C)  Pulse  Av  Min: 67  Max: 89  Resp  Av.5  Min: 18  Max: 20  SpO2  Av.6 %  Min: 95 %  Max: 99 %  Weight  Av.6 kg (135 lb 12.9 oz)  Min: 61.6 kg (135 lb 12.9 oz)  Max: 61.6 kg (135 lb 12.9 oz)  I/O last 3 completed shifts:  In: 500 [P.O.:500]  Out:  [Stool:]    Physical Examination:  Gen: awake, alert, NAD  HEENT: normocephalic, EOMI, MMM, healing scabs on lips  CV: RRR, no murmurs appreciated  Lungs: faint crackles in bases of lungs bilaterally   GI: Soft, non tender, ostomy appears ok with high volume ouput   Extrem: no edema, clubbing, or cyanosis  Skin: dry, warm, intact. No bruising or rashes.  Neuro: participates in exam, moving all extremities without restriction       Laboratory:  Recent Labs   Lab 23  0436 23  0342 23  0459   WBC 45.98* 43.91* 32.12*   HGB 8.8* 8.3* 7.5*   HCT 25.5* 24.4* 21.7*    248 279   * 126* 123*   K 4.9 4.7 4.7   CL 97 94* 94*   CREATININE 4.7* 3.5* 5.0*   BUN 72* 39* 56*   CO2 22* 23 21*   * 479* 331*   * 113* 79*       Radiology:  Imaging has been reviewed personally.       Assessment and Plan:     Lily Green is a 72 y.o. woman with a history of celiac disease, DM2 well controlled, HTN, anxiety presented after having a fall. Noted to have severe lactic acidosis with imaging concerning for ischemic colitis. Became hypotensive and started on levophed and vasopressin for shock. On arrival to [place] taken to OR for colectomy on . UOP severely diminished and essentially became anuric with only  50ml UOP. [facility] consulted for severe metabolic acidosis with pH 7.14 and HINA in setting of septic shock and ischemic colitis. HD initiated 1/14 and had one session with improvement in acidosis. Developed hemorrhagic shock with bleeding into the abdomen 1/16 and taken back to OR for exlap and ileostomy creation. CRRT initiated 1/16, now off pressors and back on iHD.      Stage III Anuric HINA  - baseline creatinine 0.8  - presenting in shock on pressors with ischemic colitis.  Colectomy 1/14 but continued to have refractory acidosis and minimal UOP. On 1/16 developed hemorrhagic shock causing another ischemic insult.  - ATN with 2 separate insults will likely take several weeks to months to recover  - HD initiated 1/14; CRRT initiated 1/16 and discontinued 1/17; iHD resumed 1/17  - remains anuric  - continue HD on MWF schedule  - 3L UF today for hyponatremia and labored breathing  - given that she is still anuric and will have prolonged recovery, HD chair should be included in needs when discharge planning  - renally dose all medications      Anion Gap Metabolic Acidosis  - bicarb 21 today, improves after HD     Hyponatremia  -  most consistent with hypervolemia, Na down to 123 today. 3L UF as tolerated      Hyperphosphatemia  - phos initially high but now on regular HD and lower. Phos today 3.2, expect to be lower after HD. No need for renal diet      Hypocalcemia  - Calcium corrects for low albumin       Thank you for allowing us to participate in the care of this patient. Please call with any questions.       Elizabeth Mobley, DO  [facility]

## 2023-01-23 NOTE — PROGRESS NOTES
[facility] Surg  General Surgery  Progress Note    Subjective:     History of Present Illness:  No notes on file    Post-Op Info:  Procedure(s) (LRB):  LAPAROTOMY, EXPLORATORY (N/A)  CLOSURE, COLOSTOMY (Left)  CREATION, COLOSTOMY (Right)   7 Days Post-Op     Interval History:   No acute issues overnight. Afebrile. VSS  Pain controlled well  no nausea / vomiting  (+) bowel function, 500cc in last 24 hrs      Medications:  Continuous Infusions:  Scheduled Meds:   sodium chloride 0.9%   Intravenous Once    ceFEPime (MAXIPIME) IVPB  2 g Intravenous Q24H    heparin (porcine)  5,000 Units Subcutaneous Q8H    metronidazole  500 mg Intravenous Q8H     PRN Meds:acetaminophen, dextrose 10%, dextrose 10%, dextrose 10%, dicyclomine, glucagon (human recombinant), glucose, glucose, heparin (porcine), hydrALAZINE, insulin aspart U-100, LIDOcaine, melatonin, ondansetron, promethazine, sodium chloride 0.9%, sodium chloride 0.9%     Review of patient's allergies indicates:   Allergen Reactions    Crestor [rosuvastatin] Swelling    Gluten protein      Objective:     Vital Signs (Most Recent):  Temp: 98.8 °F (37.1 °C) (01/23/23 0307)  Pulse: 74 (01/23/23 0307)  Resp: 20 (01/23/23 0307)  BP: (!) 147/70 (01/23/23 0307)  SpO2: 97 % (01/23/23 0307)   Vital Signs (24h Range):  Temp:  [98.8 °F (37.1 °C)-100.2 °F (37.9 °C)] 98.8 °F (37.1 °C)  Pulse:  [74-89] 74  Resp:  [18-20] 20  SpO2:  [95 %-97 %] 97 %  BP: (132-147)/(60-70) 147/70     Weight: 61.6 kg (135 lb 12.9 oz)  Body mass index is 22.6 kg/m².    Intake/Output - Last 3 Shifts         01/21 0700  01/22 0659 01/22 0700  01/23 0659    P.O. 1500 500    Other 500     IV Piggyback 222.2     Total Intake(mL/kg) 2222.2 (37.7) 500 (8.1)    Urine (mL/kg/hr)  0 (0)    Other 2500     Stool 1590 1050    Total Output 4090 1050    Net -1867.8 -550          Stool Occurrence  1 x            Physical Exam  Constitutional:       General: She is not in acute distress.     Appearance: She is not  ill-appearing or toxic-appearing.   Cardiovascular:      Rate and Rhythm: Normal rate and regular rhythm.   Pulmonary:      Effort: Pulmonary effort is normal. No respiratory distress.   Abdominal:      General: There is no distension.      Palpations: Abdomen is soft.      Tenderness: There is no abdominal tenderness. There is no guarding.      Hernia: No hernia is present.      Comments: Midline incision C/D/I with staples in place   LLQ incision C/D/I with staples in place   RLQ ostomy pink, patient and viable with air and stool in bag    Skin:     Capillary Refill: Capillary refill takes less than 2 seconds.   Neurological:      Mental Status: She is alert. Mental status is at baseline.       Significant Labs:  I have reviewed all pertinent lab results within the past 24 hours.  CBC:   Recent Labs   Lab 01/23/23 0459   WBC 32.12*   RBC 2.70*   HGB 7.5*   HCT 21.7*      MCV 80*   MCH 27.8   MCHC 34.6     CMP:   Recent Labs   Lab 01/23/23 0459   GLU 96   CALCIUM 8.0*   ALBUMIN 1.8*   PROT 5.6*   *   K 4.7   CO2 21*   CL 94*   BUN 56*   CREATININE 5.0*   ALKPHOS 106   *   AST 79*   BILITOT 0.5       Significant Diagnostics:  I have reviewed all pertinent imaging results/findings within the past 24 hours.    Assessment/Plan:     Large bowel ischemia  71 yo female now s/p total abdominal colectomy with end ileostomy, takeback for bleeding on 1/16, now stable and off pressors, stepped down to the floor.    Continue mechanical soft diet   Leukocystosis downtrending - CT abd/pelvis with nonspecific fluid collections without signs of organized abscess formation;  Findings consistent with recent laparotomy with washout  Multimodal pain control  Continue PT/OT  Encourage IS use   Rest of care per primary             Sekou Villatoro MD  General Surgery  [facility]    Pt seen and examined.  Agree with note and plan.  CT noted, WBC down  Incisions C/D/I  Ileostomy with significant output, add  Melvin Javier M.D., F.A.C.S.  [facility]  [facility]

## 2023-01-23 NOTE — ASSESSMENT & PLAN NOTE
73 yo female now s/p total abdominal colectomy with end ileostomy, takeback for bleeding on 1/16, now stable and off pressors, stepped down to the floor.    Continue mechanical soft diet   Leukocystosis downtrending - CT abd/pelvis with nonspecific fluid collections without signs of organized abscess formation;  Findings consistent with recent laparotomy with washout  Multimodal pain control  Continue PT/OT  Encourage IS use   Rest of care per primary

## 2023-01-23 NOTE — PLAN OF CARE
spoke with patient this morning. No family at bedside. Plan is for [facility] at discharge. [facility] and will submit auth. Patient still requiring HD and antibiotics. She also has a Illeostomy in place. MD team was made aware patient needs a tunneled HD cath prior to transport. I contacted patient's  Deric as well. All questions answered.  encouraged to call with any questions or concerns.  will continue to follow patient through transitions of care and assist with any discharge needs.     Patient Contacts    Name Relation Home Work Mobile   Sweetie Saha Sister [phone]   Deric Green Spouse   [phone]   Mandeep Meyer Relative   [phone]     Future Appointments   Date Time Provider Department Center   2/13/2023  2:20 PM Ej Mitchell MD [facility] [id number]   2/20/2023  2:15 PM Raz Serrano MD [facility] [place]   4/4/2023  2:20 PM Lucas Bryan DPM [facility] [place]   5/4/2023 11:30 AM Pavel Calixto MD [facility] [place]         01/23/23 7028   Discharge Reassessment   Assessment Type Discharge Planning Reassessment   Did the patient's condition or plan change since previous assessment? No   Discharge Plan discussed with: Patient;Adult children   Discharge Plan A Long-term acute care facility (LTAC)   Discharge Plan B Rehab   DME Needed Upon Discharge  other (see comments)  (TBD)   Discharge Barriers Identified None   Why the patient remains in the hospital Requires continued medical care   Post-Acute Status   Post-Acute Authorization Placement   Post-Acute Placement Status Pending payor review/awaiting authorization (if required)   Hospital Resources/Appts/Education Provided Appointments scheduled by Navigator/Coordinator   Discharge Delays None known at this time     Anne Beach RN    [phone]

## 2023-01-23 NOTE — PT/OT/SLP PROGRESS
Physical Therapy      Patient Name:  Lily Green   MRN:  [id number]    Patient not seen today secondary to Dialysis. Will follow-up as able.  1/23/2023

## 2023-01-23 NOTE — PT/OT/SLP PROGRESS
"Speech Language Pathology Treatment    Patient Name:  Lily Green   MRN:  [id number]  Admitting Diagnosis: Peritonitis    Recommendations:                 Diet recommendations:  Mechanical soft, Liquid Diet Level: Thin   Aspiration Precautions: upright for meals, slow rate, alternate sips and bites, encourage PO, boost in between meals    General Precautions: Standard, fall  Communication strategies:  reorient    Subjective     Pt seen this date in room, pt is awake and alert. Pt with blank stare noted.   Patient goals: "this is ok."     Pain/Comfort:  Pain Rating 1: 0/10    Respiratory Status: Room air    Objective:     Has the patient been evaluated by SLP for swallowing?   Yes  Keep patient NPO? No   Current Respiratory Status:    RA    Swallowing: Pt awake and cooperative. Pt was noted to be drinking oral supplement. Pt needed encouragement to consume bites of eggs and sausage. Pt did take in bites of peaches with no difficulty noted. Pt needs encouragement with meals and would benefit from appetite stimulant.   Will discuss with team.       Assessment:     Lily Green is a 72 y.o. female  admitted with Peritonitis who is safe for continued oral diet of [facility] soft and thin liquids.     Goals:   Multidisciplinary Problems       SLP Goals          Problem: SLP    Goal Priority Disciplines Outcome   SLP Goal     SLP Adequate for Care Transition   Description: Short Term Goals:  1. Pt will participate in swallow eval to determine safest diet level   2. Pt will tolerate clear liquid diet with no audible dysphagia signs.                        Plan:     Patient to be seen:  3 x/week   Plan of Care expires:  02/17/23  Plan of Care reviewed with:  patient   SLP Follow-Up:  No       Discharge recommendations:  LTACH (long-term acute care hospital)   Barriers to Discharge:  none    Time Tracking:     SLP Treatment Date:   01/23/23  Speech Start Time:  1030  Speech Stop Time:  1042     Speech Total " Time (min):  12 min    Billable Minutes: Treatment Swallowing Dysfunction 12 01/23/2023

## 2023-01-23 NOTE — PLAN OF CARE
Progressing with OT POC, updated goals. Tolerating transition movements without adverse change in vital signs. Able to perform 1 stand and side steps with minimal assist and cues for technique. Oriented x4, but still requiring assistance for problem solving during functional activities such as grooming or scooting.  Anticipate when medically stable patient will benefit from post acute therapy in an inpatient setting. Participatory in guided imagery / distraction conversation to decrease fixation on pain. Further DME recommendations to be provided as she progresses.    Problem: Occupational Therapy  Goal: Occupational Therapy Goal  Description: Goals to be met by: 2/15/2023     Patient will increase functional independence with ADLs by performing:    Feeding with min assist and incorporation of upright seated posture with incorporation of conservation techniques  UE Dressing with Moderate Assistance with easy on overhead clothing  Grooming while seated with Minimal Assistance.  Rolling bilaterally with moderate assist to increased ability to self-reposition to decrease pressure injury and to decrease burden of care during pericare; goal met; upgrade to min assist  Sitting eob with min assist x8 minutes without adverse vital signs; goal met; update to SBA  Bed < > BSC transfer with moderate assist via least restrictive method  Increase BUE MMT grossly to 3+/5 to integrate dominant UE into daily living tasks    1/23/2023 1419 by Demi Lima OT  Outcome: Ongoing, Progressing

## 2023-01-23 NOTE — ASSESSMENT & PLAN NOTE
Patient in renal failure 2/2 ATN  Receiving intermittent HD  Patient may have had excess PO fluids on 1/22/23  Plan  Follow nephro Recs  Fluid restriction  Will attempt 3L removal via Ultra filtration in HD

## 2023-01-24 ENCOUNTER — ANESTHESIA EVENT (OUTPATIENT)
Dept: SURGERY | Facility: HOSPITAL | Age: 73
DRG: 853 | End: 2023-01-24
Payer: MEDICARE

## 2023-01-24 ENCOUNTER — ANESTHESIA (OUTPATIENT)
Dept: SURGERY | Facility: HOSPITAL | Age: 73
DRG: 853 | End: 2023-01-24
Payer: MEDICARE

## 2023-01-24 PROBLEM — I10 PRIMARY HYPERTENSION: Status: ACTIVE | Noted: 2023-01-24

## 2023-01-24 LAB
ALBUMIN SERPL BCP-MCNC: 1.8 G/DL (ref 3.5–5.2)
ALP SERPL-CCNC: 100 U/L (ref 55–135)
ALT SERPL W/O P-5'-P-CCNC: 257 U/L (ref 10–44)
ANION GAP SERPL CALC-SCNC: 8 MMOL/L (ref 8–16)
ANISOCYTOSIS BLD QL SMEAR: SLIGHT
AST SERPL-CCNC: 47 U/L (ref 10–40)
BASOPHILS # BLD AUTO: ABNORMAL K/UL (ref 0–0.2)
BASOPHILS NFR BLD: 0 % (ref 0–1.9)
BILIRUB SERPL-MCNC: 0.4 MG/DL (ref 0.1–1)
BUN SERPL-MCNC: 35 MG/DL (ref 8–23)
CALCIUM SERPL-MCNC: 8.1 MG/DL (ref 8.7–10.5)
CHLORIDE SERPL-SCNC: 100 MMOL/L (ref 95–110)
CO2 SERPL-SCNC: 21 MMOL/L (ref 23–29)
CREAT SERPL-MCNC: 3.6 MG/DL (ref 0.5–1.4)
DIFFERENTIAL METHOD: ABNORMAL
EOSINOPHIL # BLD AUTO: ABNORMAL K/UL (ref 0–0.5)
EOSINOPHIL NFR BLD: 0 % (ref 0–8)
ERYTHROCYTE [DISTWIDTH] IN BLOOD BY AUTOMATED COUNT: 18.9 % (ref 11.5–14.5)
EST. GFR  (NO RACE VARIABLE): 13 ML/MIN/1.73 M^2
FINAL PATHOLOGIC DIAGNOSIS: NORMAL
GLUCOSE SERPL-MCNC: 121 MG/DL (ref 70–110)
GROSS: NORMAL
HCT VFR BLD AUTO: 22.9 % (ref 37–48.5)
HGB BLD-MCNC: 7.8 G/DL (ref 12–16)
HYPOCHROMIA BLD QL SMEAR: ABNORMAL
IMM GRANULOCYTES # BLD AUTO: ABNORMAL K/UL (ref 0–0.04)
IMM GRANULOCYTES NFR BLD AUTO: ABNORMAL % (ref 0–0.5)
LYMPHOCYTES # BLD AUTO: ABNORMAL K/UL (ref 1–4.8)
LYMPHOCYTES NFR BLD: 5 % (ref 18–48)
Lab: NORMAL
MAGNESIUM SERPL-MCNC: 2 MG/DL (ref 1.6–2.6)
MCH RBC QN AUTO: 28.1 PG (ref 27–31)
MCHC RBC AUTO-ENTMCNC: 34.1 G/DL (ref 32–36)
MCV RBC AUTO: 82 FL (ref 82–98)
METAMYELOCYTES NFR BLD MANUAL: 1 %
MONOCYTES # BLD AUTO: ABNORMAL K/UL (ref 0.3–1)
MONOCYTES NFR BLD: 4 % (ref 4–15)
MYELOCYTES NFR BLD MANUAL: 1 %
NEUTROPHILS NFR BLD: 79 % (ref 38–73)
NEUTS BAND NFR BLD MANUAL: 10 %
NRBC BLD-RTO: 0 /100 WBC
PHOSPHATE SERPL-MCNC: 3.4 MG/DL (ref 2.7–4.5)
PLATELET # BLD AUTO: 360 K/UL (ref 150–450)
PLATELET BLD QL SMEAR: ABNORMAL
PMV BLD AUTO: 10.2 FL (ref 9.2–12.9)
POCT GLUCOSE: 124 MG/DL (ref 70–110)
POCT GLUCOSE: 147 MG/DL (ref 70–110)
POCT GLUCOSE: 163 MG/DL (ref 70–110)
POCT GLUCOSE: 281 MG/DL (ref 70–110)
POIKILOCYTOSIS BLD QL SMEAR: SLIGHT
POLYCHROMASIA BLD QL SMEAR: ABNORMAL
POTASSIUM SERPL-SCNC: 4.4 MMOL/L (ref 3.5–5.1)
PROT SERPL-MCNC: 5.9 G/DL (ref 6–8.4)
RBC # BLD AUTO: 2.78 M/UL (ref 4–5.4)
SODIUM SERPL-SCNC: 129 MMOL/L (ref 136–145)
WBC # BLD AUTO: 26.27 K/UL (ref 3.9–12.7)

## 2023-01-24 PROCEDURE — 97535 SELF CARE MNGMENT TRAINING: CPT

## 2023-01-24 PROCEDURE — 11000001 HC ACUTE MED/SURG PRIVATE ROOM

## 2023-01-24 PROCEDURE — 99900035 HC TECH TIME PER 15 MIN (STAT)

## 2023-01-24 PROCEDURE — 25000003 PHARM REV CODE 250: Performed by: SURGERY

## 2023-01-24 PROCEDURE — 97110 THERAPEUTIC EXERCISES: CPT

## 2023-01-24 PROCEDURE — 25000003 PHARM REV CODE 250

## 2023-01-24 PROCEDURE — 85007 BL SMEAR W/DIFF WBC COUNT: CPT | Performed by: FAMILY MEDICINE

## 2023-01-24 PROCEDURE — 97112 NEUROMUSCULAR REEDUCATION: CPT

## 2023-01-24 PROCEDURE — 85027 COMPLETE CBC AUTOMATED: CPT | Performed by: FAMILY MEDICINE

## 2023-01-24 PROCEDURE — 63600175 PHARM REV CODE 636 W HCPCS: Performed by: STUDENT IN AN ORGANIZED HEALTH CARE EDUCATION/TRAINING PROGRAM

## 2023-01-24 PROCEDURE — 63600175 PHARM REV CODE 636 W HCPCS

## 2023-01-24 PROCEDURE — 97530 THERAPEUTIC ACTIVITIES: CPT

## 2023-01-24 PROCEDURE — 80053 COMPREHEN METABOLIC PANEL: CPT | Performed by: STUDENT IN AN ORGANIZED HEALTH CARE EDUCATION/TRAINING PROGRAM

## 2023-01-24 PROCEDURE — S0030 INJECTION, METRONIDAZOLE: HCPCS

## 2023-01-24 PROCEDURE — 97116 GAIT TRAINING THERAPY: CPT

## 2023-01-24 PROCEDURE — 84100 ASSAY OF PHOSPHORUS: CPT | Performed by: STUDENT IN AN ORGANIZED HEALTH CARE EDUCATION/TRAINING PROGRAM

## 2023-01-24 PROCEDURE — 94799 UNLISTED PULMONARY SVC/PX: CPT

## 2023-01-24 PROCEDURE — 94761 N-INVAS EAR/PLS OXIMETRY MLT: CPT

## 2023-01-24 PROCEDURE — 83735 ASSAY OF MAGNESIUM: CPT | Performed by: STUDENT IN AN ORGANIZED HEALTH CARE EDUCATION/TRAINING PROGRAM

## 2023-01-24 RX ORDER — ONDANSETRON 2 MG/ML
4 INJECTION INTRAMUSCULAR; INTRAVENOUS ONCE AS NEEDED
Status: CANCELLED | OUTPATIENT
Start: 2023-01-24 | End: 2034-06-21

## 2023-01-24 RX ORDER — OXYCODONE HYDROCHLORIDE 5 MG/1
5 TABLET ORAL
Status: CANCELLED | OUTPATIENT
Start: 2023-01-24

## 2023-01-24 RX ORDER — HYDROMORPHONE HYDROCHLORIDE 2 MG/ML
0.2 INJECTION, SOLUTION INTRAMUSCULAR; INTRAVENOUS; SUBCUTANEOUS EVERY 5 MIN PRN
Status: CANCELLED | OUTPATIENT
Start: 2023-01-24

## 2023-01-24 RX ORDER — FENTANYL CITRATE 50 UG/ML
25 INJECTION, SOLUTION INTRAMUSCULAR; INTRAVENOUS EVERY 5 MIN PRN
Status: CANCELLED | OUTPATIENT
Start: 2023-01-24

## 2023-01-24 RX ORDER — SODIUM CHLORIDE 0.9 % (FLUSH) 0.9 %
3 SYRINGE (ML) INJECTION
Status: CANCELLED | OUTPATIENT
Start: 2023-01-24

## 2023-01-24 RX ADMIN — METRONIDAZOLE 500 MG: 5 INJECTION, SOLUTION INTRAVENOUS at 08:01

## 2023-01-24 RX ADMIN — HEPARIN SODIUM 5000 UNITS: 5000 INJECTION INTRAVENOUS; SUBCUTANEOUS at 05:01

## 2023-01-24 RX ADMIN — CEFEPIME 2 G: 2 INJECTION, POWDER, FOR SOLUTION INTRAVENOUS at 11:01

## 2023-01-24 RX ADMIN — METRONIDAZOLE 500 MG: 5 INJECTION, SOLUTION INTRAVENOUS at 12:01

## 2023-01-24 RX ADMIN — HYDROCODONE BITARTRATE AND ACETAMINOPHEN 1 TABLET: 5; 325 TABLET ORAL at 12:01

## 2023-01-24 RX ADMIN — INSULIN ASPART 6 UNITS: 100 INJECTION, SOLUTION INTRAVENOUS; SUBCUTANEOUS at 05:01

## 2023-01-24 RX ADMIN — METRONIDAZOLE 500 MG: 5 INJECTION, SOLUTION INTRAVENOUS at 05:01

## 2023-01-24 NOTE — PLAN OF CARE
Continue OT POC. Exceeded goals; Upgraded goals. Able to participate in self-directed ADL intermittently with physical assist but still reliant occasionally for initiation of tasks. Working diligently on both regaining strength and also working on improving self-determination skills to support maximizing functional recovery. See note to follow for detailed report. Anticipate post acute therapy upon medically stable for d/c.    Problem: Occupational Therapy  Goal: Occupational Therapy Goal  Description: Goals to be met by: 2/15/2023     Patient will increase functional independence with ADLs by performing:    Feeding with min assist and incorporation of upright seated posture with incorporation of conservation techniques; 1/24/23 update goal to set up assist  UE Dressing with Moderate Assistance with easy on overhead clothing; 1/24/23 update goal to set up assist  Grooming while seated with Minimal Assistance; 1/24/23 update goal to set up assist  Rolling bilaterally with moderate assist to increased ability to self-reposition to decrease pressure injury and to decrease burden of care during pericare; goal met; upgrade to SBA  Sitting eob with min assist x8 minutes without adverse vital signs; 1/24/23 SBA goal exceeded; d/c goal  Bed < > BSC transfer with moderate assist via least restrictive method  Increase BUE MMT grossly to 3+/5 to integrate dominant UE into daily living tasks; 1/24/23 update goal to 4-/5    Outcome: Ongoing, Progressing

## 2023-01-24 NOTE — PLAN OF CARE
VN note: Patient chart, labs, and vitals reviewed. VN to continue to be available as needed. '  Problem: Adult Inpatient Plan of Care  Goal: Plan of Care Review  Outcome: Ongoing, Progressing

## 2023-01-24 NOTE — PT/OT/SLP PROGRESS
Occupational Therapy   Treatment    Name: Lily Green  MRN: [id number]  Admitting Diagnosis:  Peritonitis  8 Days Post-Op    Recommendations:     Discharge Recommendations: other (see comments) (post acute therapy at inpatient setting (per chart: LTAC))  Discharge Equipment Recommendations:  other (see comments) (TBD)  Barriers to discharge:  Other (Comment) (low activity tolerance; decreased independence in functional activity; medical management)    Assessment:     Lily Green is a 72 y.o. female with a medical diagnosis of Peritonitis.  She presents with ..The primary encounter diagnosis was Peritonitis [K65.9 (ICD-10-CM)]. Diagnoses of Submucosal colonic lesion, Shock, Large bowel ischemia, ATN (acute tubular necrosis), HINA (acute kidney injury), Acute hypoxemic respiratory failure, and Elevated lactic acid level were also pertinent to this visit.  . Performance deficits affecting function are weakness, impaired endurance, impaired cognition, impaired sensation, decreased coordination, impaired self care skills, decreased upper extremity function, decreased lower extremity function, impaired functional mobility, impaired skin, gait instability, impaired balance, pain.     Continue OT POC. Exceeded goals; Upgraded goals. Able to participate in self-directed ADL intermittently with physical assist but still reliant occasionally for initiation of tasks. Working diligently on both regaining strength and also working on improving self-determination skills to support maximizing functional recovery.  Anticipate post acute therapy upon medically stable for d/c.    Rehab Prognosis:  Good; patient would benefit from acute skilled OT services to address these deficits and reach maximum level of function.       Plan:     Patient to be seen 3 x/week to address the above listed problems via self-care/home management, therapeutic activities, therapeutic exercises  Plan of Care Expires: 02/15/23  Plan of  "Care Reviewed with: patient, sibling    Subjective     Pain/Comfort:  Pain Rating 1: other (see comments) (unrated but reports neuropathy pain in B feet with socks on)  Pain Rating Post-Intervention 1: other (see comments) (after removing socks reports less but still present (unrated) neuropathy pain in B feet)    Objective:     Communicated with: nursing prior to session.  Patient found up in chair with bed alarm, peripheral IV upon OT entry to room.    General Precautions: Standard, fall    Orthopedic Precautions:N/A  Braces: N/A  Respiratory Status: Room air     Occupational Performance:       Activities of Daily Living:  Grooming: contact guard assistance ; min verbal cues / visual cues for safety to avoid overly excessive scrubbing while participating in oral hgyiene   Lower Body Dressing: in long sit to cross leg approach while upright in recliner able to  doff socks with increased time without additional physical assist; min verbal cues for technique/encouragment      [facility] 6 Click ADL: 15    Treatment & Education:  Patient reporting fatigue at OT entry and discomfort/ burning in feet due to neuropathy aggravated by socks on feet. Patient's spouse and sister present. Therapist fostered reflective response and min problem solving cueing to facilitate patient to initiate doffing socks as above. Alert & oriented x3 with increased time and use of white board for assist. Continued incorporation of choosing one affirmation to verbalize aloud as apart of patient's client factor of spirituality (in addition to supportive mental health strategy to reduce delirium in hospital setting) in which she reports "I am blessed". Participatory in minimal gentle therapuetic exercise: cervical minimal flexion/extension 2 x10, B shoulder shrugs 1 x15, and 1 x15-20 alternating shoulder taps with full range B elbow flexion/extension while avoiding resting against back rest. Rest break provided. Performed oral hygiene as above. " Therapist educated patient/ family on positive implications correlated to peforming oral hygiene to reduce risk of developing pnemonia while hospitalized. No further questions.    Patient left up in chair with all lines intact, call button in reach, chair alarm on, and nursing notified , family present    GOALS:   Multidisciplinary Problems       Occupational Therapy Goals          Problem: Occupational Therapy    Goal Priority Disciplines Outcome Interventions   Occupational Therapy Goal     OT, PT/OT Ongoing, Progressing    Description: Goals to be met by: 2/15/2023     Patient will increase functional independence with ADLs by performing:    Feeding with min assist and incorporation of upright seated posture with incorporation of conservation techniques; 1/24/23 update goal to set up assist  UE Dressing with Moderate Assistance with easy on overhead clothing; 1/24/23 update goal to set up assist  Grooming while seated with Minimal Assistance; 1/24/23 update goal to set up assist  Rolling bilaterally with moderate assist to increased ability to self-reposition to decrease pressure injury and to decrease burden of care during pericare; goal met; upgrade to SBA  Sitting eob with min assist x8 minutes without adverse vital signs; 1/24/23 SBA goal exceeded; d/c goal  Bed < > BSC transfer with moderate assist via least restrictive method  Increase BUE MMT grossly to 3+/5 to integrate dominant UE into daily living tasks; 1/24/23 update goal to 4-/5                         Time Tracking:     OT Date of Treatment: 01/24/23  OT Start Time: 1130  OT Stop Time: 1155  OT Total Time (min): 25 min    Billable Minutes:Self Care/Home Management 15 min  Therapeutic Exercise 10 min    OT/RICHARD: OT          1/24/2023

## 2023-01-24 NOTE — SUBJECTIVE & OBJECTIVE
Interval History: Reports Neuropathic pain in soles of feet and palms    Review of Systems   Constitutional:  Negative for fatigue and fever.   HENT:  Negative for congestion and sore throat.    Eyes:  Negative for visual disturbance.   Respiratory:  Negative for apnea, shortness of breath and wheezing.    Cardiovascular:  Negative for chest pain.   Gastrointestinal:  Positive for abdominal pain and diarrhea. Negative for nausea and vomiting.   Genitourinary:  Negative for dysuria.   Musculoskeletal:  Negative for arthralgias and back pain.   Neurological:  Negative for dizziness and headaches.        Burning neuropathy in palms and soles   Psychiatric/Behavioral: Negative.     Objective:     Vital Signs (Most Recent):  Temp: 97.4 °F (36.3 °C) (01/24/23 0706)  Pulse: 79 (01/24/23 0815)  Resp: 16 (01/24/23 0815)  BP: (!) 170/78 (01/24/23 0706)  SpO2: 97 % (01/24/23 0815)   Vital Signs (24h Range):  Temp:  [97.4 °F (36.3 °C)-98.8 °F (37.1 °C)] 97.4 °F (36.3 °C)  Pulse:  [] 79  Resp:  [16-20] 16  SpO2:  [95 %-98 %] 97 %  BP: (121-170)/(71-91) 170/78     Weight: 61 kg (134 lb 7.7 oz)  Body mass index is 22.38 kg/m².    Intake/Output Summary (Last 24 hours) at 1/24/2023 1005  Last data filed at 1/24/2023 0541  Gross per 24 hour   Intake 1340.19 ml   Output 3450 ml   Net -2109.81 ml      Physical Exam  HENT:      Head: Normocephalic.      Comments: Contusion over left eye     Right Ear: External ear normal.      Left Ear: External ear normal.      Nose: Nose normal.      Mouth/Throat:      Mouth: Mucous membranes are moist.   Eyes:      Pupils: Pupils are equal, round, and reactive to light.   Cardiovascular:      Rate and Rhythm: Normal rate.      Pulses: Normal pulses.   Pulmonary:      Effort: Pulmonary effort is normal.   Abdominal:      General: Abdomen is flat.      Tenderness: There is abdominal tenderness.      Comments: Laparotomy and Ostomy sites clear clean and intact  Profuse liquid output per ileostomy     Skin:     General: Skin is warm.   Neurological:      Mental Status: She is alert.      Comments: Improved mentation and focus  Follows all commands  Improved recall  Slow speech   Psychiatric:      Comments: Some confusion and possible delusions         Significant Labs: All pertinent labs within the past 24 hours have been reviewed.  BMP:   Recent Labs   Lab 01/24/23  0530   *   *   K 4.4      CO2 21*   BUN 35*   CREATININE 3.6*   CALCIUM 8.1*   MG 2.0     CBC:   Recent Labs   Lab 01/23/23  0459 01/24/23  0530   WBC 32.12* 26.27*   HGB 7.5* 7.8*   HCT 21.7* 22.9*    360     CMP:   Recent Labs   Lab 01/23/23  0459 01/24/23  0530   * 129*   K 4.7 4.4   CL 94* 100   CO2 21* 21*   GLU 96 121*   BUN 56* 35*   CREATININE 5.0* 3.6*   CALCIUM 8.0* 8.1*   PROT 5.6* 5.9*   ALBUMIN 1.8* 1.8*   BILITOT 0.5 0.4   ALKPHOS 106 100   AST 79* 47*   * 257*   ANIONGAP 8 8       Significant Imaging: I have reviewed all pertinent imaging results/findings within the past 24 hours.

## 2023-01-24 NOTE — PROGRESS NOTES
[facility] Surg  General Surgery  Progress Note    Subjective:     History of Present Illness:  No notes on file    Post-Op Info:  Procedure(s) (LRB):  LAPAROTOMY, EXPLORATORY (N/A)  CLOSURE, COLOSTOMY (Left)  CREATION, COLOSTOMY (Right)   8 Days Post-Op     Interval History:   NAEO. Doing well. Ostomy output improved with BID imodium  Needs tunneled line    Medications:  Continuous Infusions:  Scheduled Meds:   sodium chloride 0.9%   Intravenous Once    sodium chloride 0.9%   Intravenous Once    ceFEPime (MAXIPIME) IVPB  2 g Intravenous Q24H    heparin (porcine)  5,000 Units Subcutaneous Q8H    loperamide  2 mg Oral BID    metronidazole  500 mg Intravenous Q8H     PRN Meds:dextrose 10%, dextrose 10%, dextrose 10%, dicyclomine, glucagon (human recombinant), glucose, glucose, heparin (porcine), hydrALAZINE, HYDROcodone-acetaminophen, influenza 65up-adj, insulin aspart U-100, LIDOcaine, melatonin, ondansetron, promethazine, sodium chloride 0.9%, sodium chloride 0.9%, sodium chloride 0.9%     Review of patient's allergies indicates:   Allergen Reactions    Crestor [rosuvastatin] Swelling    Gluten protein      Objective:     Vital Signs (Most Recent):  Temp: 97.4 °F (36.3 °C) (01/24/23 0706)  Pulse: 79 (01/24/23 0815)  Resp: 16 (01/24/23 0815)  BP: (!) 170/78 (01/24/23 0706)  SpO2: 97 % (01/24/23 0815)   Vital Signs (24h Range):  Temp:  [97.4 °F (36.3 °C)-98.8 °F (37.1 °C)] 97.4 °F (36.3 °C)  Pulse:  [] 79  Resp:  [16-20] 16  SpO2:  [95 %-98 %] 97 %  BP: (121-170)/(71-91) 170/78     Weight: 61 kg (134 lb 7.7 oz)  Body mass index is 22.38 kg/m².    Intake/Output - Last 3 Shifts         01/22 0700 01/23 0659 01/23 0700 01/24 0659 01/24 0700 01/25 0659    P.O. 500 285     Other  500     IV Piggyback  555.2     Total Intake(mL/kg) 500 (8.1) 1340.2 (22)     Urine (mL/kg/hr) 0 (0) 0 (0)     Other  3000     Stool 1050 450     Total Output 1050 3450     Net -550 -2109.8            Urine Occurrence  0 x     Stool  Occurrence 1 x 0 x             Physical Exam  Constitutional:       General: She is not in acute distress.     Appearance: She is not ill-appearing or toxic-appearing.   Cardiovascular:      Rate and Rhythm: Normal rate and regular rhythm.   Pulmonary:      Effort: Pulmonary effort is normal. No respiratory distress.   Abdominal:      General: There is no distension.      Palpations: Abdomen is soft.      Tenderness: There is no abdominal tenderness. There is no guarding.      Hernia: No hernia is present.      Comments: Midline incision C/D/I with staples in place   LLQ incision C/D/I with staples in place   RLQ ostomy pink, patient and viable with air and stool in bag    Skin:     Capillary Refill: Capillary refill takes less than 2 seconds.   Neurological:      Mental Status: She is alert. Mental status is at baseline.       Significant Labs:  I have reviewed all pertinent lab results within the past 24 hours.  CBC:   Recent Labs   Lab 01/24/23  0530   WBC 26.27*   RBC 2.78*   HGB 7.8*   HCT 22.9*      MCV 82   MCH 28.1   MCHC 34.1     CMP:   Recent Labs   Lab 01/24/23  0530   *   CALCIUM 8.1*   ALBUMIN 1.8*   PROT 5.9*   *   K 4.4   CO2 21*      BUN 35*   CREATININE 3.6*   ALKPHOS 100   *   AST 47*   BILITOT 0.4       Significant Diagnostics:  I have reviewed all pertinent imaging results/findings within the past 24 hours.    Assessment/Plan:     Large bowel ischemia  71 yo female now s/p total abdominal colectomy with end ileostomy, takeback for bleeding on 1/16, now stable and off pressors, stepped down to the floor.    NPO currently - will try and find time for Line placement in OR  Leukocystosis continues to down - CT abd/pelvis with nonspecific fluid collections without signs of organized abscess formation;  Findings consistent with recent laparotomy with washout  Multimodal pain control  Continue BID Imodium for ostomy output, improved  Continue PT/OT  Encourage IS use    Rest of care per primary             Sekou Villatoro MD  General Surgery  [facility] Surg    Pt seen and examined.  Agree with note and plan.    Manuel Javier M.D., F.A.C.S.  [id number] and General Surgery  [facility]

## 2023-01-24 NOTE — ASSESSMENT & PLAN NOTE
71 yo female now s/p total abdominal colectomy with end ileostomy, takeback for bleeding on 1/16, now stable and off pressors, stepped down to the floor.    NPO currently - will try and find time for Line placement in OR  Leukocystosis continues to down - CT abd/pelvis with nonspecific fluid collections without signs of organized abscess formation;  Findings consistent with recent laparotomy with washout  Multimodal pain control  Continue BID Imodium for ostomy output, improved  Continue PT/OT  Encourage IS use   Rest of care per primary

## 2023-01-24 NOTE — PLAN OF CARE
BETTINA sent updated information via [employer] to [facility] Phone: [phone]. Bettina also reached out to Aileen with [facility]. Bettina will follow.     GRAYSON Galeano  [phone]     01/24/23 08   Post-Acute Status   Post-Acute Authorization Placement   Post-Acute Placement Status Pending payor review/awaiting authorization (if required)   Coverage [employer]   Discharge Delays (!) Post-Acute Set-up   Discharge Plan   Discharge Plan A Long-term acute care facility (LTAC)

## 2023-01-24 NOTE — SUBJECTIVE & OBJECTIVE
Interval History:   NAEO. Doing well. Ostomy output improved with BID imodium  Needs tunneled line    Medications:  Continuous Infusions:  Scheduled Meds:   sodium chloride 0.9%   Intravenous Once    sodium chloride 0.9%   Intravenous Once    ceFEPime (MAXIPIME) IVPB  2 g Intravenous Q24H    heparin (porcine)  5,000 Units Subcutaneous Q8H    loperamide  2 mg Oral BID    metronidazole  500 mg Intravenous Q8H     PRN Meds:dextrose 10%, dextrose 10%, dextrose 10%, dicyclomine, glucagon (human recombinant), glucose, glucose, heparin (porcine), hydrALAZINE, HYDROcodone-acetaminophen, influenza 65up-adj, insulin aspart U-100, LIDOcaine, melatonin, ondansetron, promethazine, sodium chloride 0.9%, sodium chloride 0.9%, sodium chloride 0.9%     Review of patient's allergies indicates:   Allergen Reactions    Crestor [rosuvastatin] Swelling    Gluten protein      Objective:     Vital Signs (Most Recent):  Temp: 97.4 °F (36.3 °C) (01/24/23 0706)  Pulse: 79 (01/24/23 0815)  Resp: 16 (01/24/23 0815)  BP: (!) 170/78 (01/24/23 0706)  SpO2: 97 % (01/24/23 0815)   Vital Signs (24h Range):  Temp:  [97.4 °F (36.3 °C)-98.8 °F (37.1 °C)] 97.4 °F (36.3 °C)  Pulse:  [] 79  Resp:  [16-20] 16  SpO2:  [95 %-98 %] 97 %  BP: (121-170)/(71-91) 170/78     Weight: 61 kg (134 lb 7.7 oz)  Body mass index is 22.38 kg/m².    Intake/Output - Last 3 Shifts         01/22 0700 01/23 0659 01/23 0700 01/24 0659 01/24 0700 01/25 0659    P.O. 500 285     Other  500     IV Piggyback  555.2     Total Intake(mL/kg) 500 (8.1) 1340.2 (22)     Urine (mL/kg/hr) 0 (0) 0 (0)     Other  3000     Stool 1050 450     Total Output 1050 3450     Net -550 -2109.8            Urine Occurrence  0 x     Stool Occurrence 1 x 0 x             Physical Exam  Constitutional:       General: She is not in acute distress.     Appearance: She is not ill-appearing or toxic-appearing.   Cardiovascular:      Rate and Rhythm: Normal rate and regular rhythm.   Pulmonary:       Effort: Pulmonary effort is normal. No respiratory distress.   Abdominal:      General: There is no distension.      Palpations: Abdomen is soft.      Tenderness: There is no abdominal tenderness. There is no guarding.      Hernia: No hernia is present.      Comments: Midline incision C/D/I with staples in place   LLQ incision C/D/I with staples in place   RLQ ostomy pink, patient and viable with air and stool in bag    Skin:     Capillary Refill: Capillary refill takes less than 2 seconds.   Neurological:      Mental Status: She is alert. Mental status is at baseline.       Significant Labs:  I have reviewed all pertinent lab results within the past 24 hours.  CBC:   Recent Labs   Lab 01/24/23  0530   WBC 26.27*   RBC 2.78*   HGB 7.8*   HCT 22.9*      MCV 82   MCH 28.1   MCHC 34.1     CMP:   Recent Labs   Lab 01/24/23  0530   *   CALCIUM 8.1*   ALBUMIN 1.8*   PROT 5.9*   *   K 4.4   CO2 21*      BUN 35*   CREATININE 3.6*   ALKPHOS 100   *   AST 47*   BILITOT 0.4       Significant Diagnostics:  I have reviewed all pertinent imaging results/findings within the past 24 hours.

## 2023-01-24 NOTE — ASSESSMENT & PLAN NOTE
Patient in renal failure 2/2 ATN  Receiving intermittent HD  Patient may have had excess PO fluids on 1/22/23  Improved to 129 post HD  Plan  Follow nephro Recs  Fluid restriction

## 2023-01-24 NOTE — NURSING
Patient back from HD. Alert and awake Vitals taken. Patient reports that pain in the stoma is better. Stoma intact with rosebud appearance. Imodium started. PRN lidocaine patch applied  on anterior left foot.

## 2023-01-24 NOTE — PT/OT/SLP PROGRESS
Physical Therapy Treatment    Patient Name:  Lily Green   MRN:  [id number]    Recommendations:     Discharge Recommendations:  (post acute therapy at inpatient setting (per chart: LTAC))  Discharge Equipment Recommendations:  (TBD)  Barriers to discharge:  requires increased assistance with all functional mobility    Assessment:     Lily Green is a 72 y.o. female admitted with a medical diagnosis of Peritonitis.  She presents with the following impairments/functional limitations: weakness, impaired endurance, impaired sensation, impaired self care skills, impaired functional mobility, gait instability, impaired balance, pain, decreased safety awareness, impaired skin, decreased coordination, impaired cognition Patient tolerating increased activity- amb 12 ft using RW with Marian; VCs to use her voice for communication instead of gestures; fatigued throughout; will cont with POC.    Rehab Prognosis: Good; patient would benefit from acute skilled PT services to address these deficits and reach maximum level of function.    Recent Surgery: Procedure(s) (LRB):  LAPAROTOMY, EXPLORATORY (N/A)  CLOSURE, COLOSTOMY (Left)  CREATION, COLOSTOMY (Right) 8 Days Post-Op    Plan:     During this hospitalization, patient to be seen 5 x/week to address the identified rehab impairments via gait training, therapeutic activities, therapeutic exercises, neuromuscular re-education and progress toward the following goals:    Plan of Care Expires:  02/18/23    Subjective     Pain/Comfort:  Pain Rating 1:  (unrated-discomfort B feet)  Location - Side 1: Bilateral  Location - Orientation 1: generalized  Location 1: foot  Pain Addressed 1: Distraction, Cessation of Activity, Nurse notified  Pain Rating Post-Intervention 1:  (as above)      Objective:     Communicated with nurse prior to session.  Patient found HOB elevated with bed alarm, Trialysis, peripheral IV upon PT entry to room.     General Precautions: Standard,  "fall  Orthopedic Precautions: N/A  Braces: N/A  Respiratory Status: Room air     Functional Mobility:  Bed Mobility:     Rolling Right: contact guard assistance and use of side rail and VCs for effective technique  Scooting: contact guard assistance to scoot forward to EOB and backward into chair with VCs for effective technique  Supine to Sit: minimum assistance and VCs for effective technique  Transfers:     Sit to Stand:  contact guard assistance and VCs for hand placement  with rolling walker  Bed to Chair: contact guard assistance and minimum assistance with  rolling walker  using  Step Transfer  Gait: Patient amb ~12ft using RW and Marian for RW management in keeping RW at the proper distance and in negotiating turns; slow darrell, short step length, stiffness with movement.      AM-PAC 6 CLICK MOBILITY  Turning over in bed (including adjusting bedclothes, sheets and blankets)?: 3  Sitting down on and standing up from a chair with arms (e.g., wheelchair, bedside commode, etc.): 3  Moving from lying on back to sitting on the side of the bed?: 3  Moving to and from a bed to a chair (including a wheelchair)?: 3  Need to walk in hospital room?: 3  Climbing 3-5 steps with a railing?: 1  Basic Mobility Total Score: 16       Treatment & Education:  Patient has met the above goals noted and continues to progress toward established goals; pt oriented to person, month (not day of week), year, place ("[facility]" but needed choice of type of place to report "hospital"; minimal indecisiveness when given choices; pt used swab to clean teeth and moisten mouth; pt using voice when prompted- able to count to 20 with active exercises and able to answer questions with short answers; performed supine A/AA ex with HOB elevated- APs, heel slides, hip abd/add, TKEs, SLR, BUE shld fl, UE reaches, shld retractions; pt performed rolling with CGA and VCs; sup to sit with Marian and VCs for effective technique; pt sat at EOB~15 min with SBA " and performed seated trunk ex with functional reaching in all quadrants ipsilateral and contralateral; sit to stand with CGA and used RW to amb~12ft with Marian for keeping RW at optimal distance and negotiating turns; pt sat in bedside chair and scooted back in chair with Marian/CGA and VCs; vitals: sitting /71 HR 94 O2 sats 96%; following activity, O2 sats 95% ; post session, /72, HR 86 O2 sats 96%    Patient left up in chair with all lines intact, call button in reach, chair alarm on, and nurse notified..    GOALS:   Multidisciplinary Problems       Physical Therapy Goals          Problem: Physical Therapy    Goal Priority Disciplines Outcome Goal Variances Interventions   Physical Therapy Goal     PT, PT/OT Ongoing, Progressing     Description: Goals to be met by: 2023     Patient will increase functional independence with mobility by performin. Supine to sit with Maximal Assistance-met   Revised: SBA  2. Rolling with Moderate Assistance.-met   Revised: SBA  3. Sitting at edge of bed x10 minutes with Minimal Assistance and Moderate Assistance-met   4. Increased functional strength to at least 3 for transfers and bed mobility-met 2023  Amend goals as appropriate  Amended goals 2023:  5. Patient will amb ~ 30ft with Marian with or without appropriate AD                          Time Tracking:     PT Received On: 23  PT Start Time: 942     PT Stop Time: 1054  PT Total Time (min): 72 min     Billable Minutes: Gait Training 10, Therapeutic Activity 30, Therapeutic Exercise 20, and Neuromuscular Re-education 12    Treatment Type: Treatment  PT/PTA: PT     PTA Visit Number: 0     2023

## 2023-01-24 NOTE — ASSESSMENT & PLAN NOTE
#Gram negative Bacteremia  -CT Abd shows possible hematoma but no signs fo acute infection or abscess   - Gram negative rods reported in Blood culture from 1/13  WBC improved to 43 form 45 1/22/23  Plan  - Continue Cefepime, flagyl for total of 14 day course End date 1/27/23  -  All further cultures on 1/17 w/ NGT  - afebrile

## 2023-01-24 NOTE — PLAN OF CARE
"  Problem: Physical Therapy  Goal: Physical Therapy Goal  Description: Goals to be met by: 2023     Patient will increase functional independence with mobility by performin. Supine to sit with Maximal Assistance-met   Revised: SBA  2. Rolling with Moderate Assistance.-met   Revised: SBA  3. Sitting at edge of bed x10 minutes with Minimal Assistance and Moderate Assistance-met   4. Increased functional strength to at least 3 for transfers and bed mobility-met 2023  Amend goals as appropriate  Amended goals 2023:  5. Patient will amb ~ 30ft with Marian with or without appropriate AD     2023 1108 by Celia Soto, PT  Patient has met the above goals noted and continues to progress toward established goals; pt oriented to person, month (not day of week), year, place ("[facility]" but needed choice of type of place to report "hospital"; minimal indecisiveness when given choices; pt used swab to clean teeth and moisten mouth; pt using voice when prompted- able to count to 20 with active exercises and able to answer questions with short answers; performed supine ex with HOB elevated; pt performed rolling with CGA and VCs; sup to sit with Marian and VCs for effective technique; pt sat at EOB~15 min with SBA and performed seated trunk ex with functional reaching in all quadrants ipsilateral and contralateral; sit to stand with CGA and used RW to amb~12ft with Marian for keeping RW at optimal distance and negotiating turns; pt sat in bedside chair and scooted back in chair with Marian/CGA and VCs; vitals: sitting /71 HR 94 O2 sats 96%; following activity, O2 sats 95% ; post session, /72, HR 86 O2 sats 96%     "

## 2023-01-24 NOTE — PLAN OF CARE
Recommendation:  1. Encourage intake of meals & supplements as tolerated.   2. Monitor weight/labs.   3. RD to continue to follow to monitor po intake    Goals:  Pt will tolerate diet with at least 50-75% intake at meals by RD follow up  Nutrition Goal Status: new

## 2023-01-24 NOTE — PROGRESS NOTES
"[facility] Progress Note    Subjective:      Ms. Green is looking great this morning sitting up in the chair and talking with . She asked to have her socks changed to soft ones while is she is not going to stand up; this is the most she has communicated with me in a while. Planning for tunneled line tomorrow and likely LTAC placement as dispo.      Objective:   Last 24 Hour Vital Signs:  BP  Min: 121/91  Max: 170/78  Temp  Av.3 °F (36.8 °C)  Min: 97.4 °F (36.3 °C)  Max: 98.8 °F (37.1 °C)  Pulse  Av.7  Min: 16  Max: 101  Resp  Av.9  Min: 16  Max: 20  SpO2  Av.8 %  Min: 95 %  Max: 98 %  Height  Av' 5" (165.1 cm)  Min: 5' 5" (165.1 cm)  Max: 5' 5" (165.1 cm)  Weight  Av kg (134 lb 7.7 oz)  Min: 61 kg (134 lb 7.7 oz)  Max: 61 kg (134 lb 7.7 oz)  I/O last 3 completed shifts:  In: 1340.2 [P.O.:285; Other:500; IV Piggyback:555.2]  Out: 4000 [Other:3000; Stool:1000]    Physical Examination:  Gen: awake, alert, NAD  HEENT: normocephalic, EOMI, MMM, healing scabs on lips  CV: RRR, no murmurs appreciated  Lungs: CTAB  GI: Soft, non tender, ostomy present   Extrem: no edema, clubbing, or cyanosis  Skin: dry, warm, intact.   Neuro: talking, moving all extremities     Laboratory:  Recent Labs   Lab 23  0342 23  0459 23  0530   WBC 43.91* 32.12* 26.27*   HGB 8.3* 7.5* 7.8*   HCT 24.4* 21.7* 22.9*    279 360   * 123* 129*   K 4.7 4.7 4.4   CL 94* 94* 100   CREATININE 3.5* 5.0* 3.6*   BUN 39* 56* 35*   CO2 23 21* 21*   * 331* 257*   * 79* 47*         Radiology:  Imaging has been reviewed personally.       Assessment and Plan:     Lily Green is a 72 y.o. woman with a history of celiac disease, DM2 well controlled, HTN, anxiety presented after having a fall. Noted to have severe lactic acidosis with imaging concerning for ischemic colitis. Became hypotensive and started on levophed and vasopressin for shock. On arrival to [place] taken " to OR for colectomy on 1/14. UOP severely diminished and essentially became anuric with only 50ml UOP. [facility] consulted for severe metabolic acidosis with pH 7.14 and HINA in setting of septic shock and ischemic colitis. HD initiated 1/14 and had one session with improvement in acidosis. Developed hemorrhagic shock with bleeding into the abdomen 1/16 and taken back to OR for exlap and ileostomy creation. CRRT initiated 1/16, now off pressors and back on iHD.      Stage III Anuric HINA  - baseline creatinine 0.8  - presenting in shock on pressors with ischemic colitis.  Colectomy 1/14 but continued to have refractory acidosis and minimal UOP. On 1/16 developed hemorrhagic shock causing another ischemic insult.  - ATN with 2 separate insults will likely take several weeks to months to recover  - HD initiated 1/14; CRRT initiated 1/16 and discontinued 1/17; iHD resumed 1/17  - remains anuric  - continue HD on MWF schedule  - 3L UF yesterday with improvement in Na, continue 3L UF with each HD session for now   - given that she is still anuric and will have prolonged recovery, HD chair should be included in needs when discharge planning. Tunneled line to be placed tomorrow   - renally dose all medications      Anion Gap Metabolic Acidosis  - bicarb 21 today, improves after HD     Hyponatremia  -  most consistent with hypervolemia, Na down to 123 before HD Monday and improved to 129 after     Hyperphosphatemia  - phos initially high but now on regular HD and lower. Phos today 3.4 today, no need for renal diet or phos binder      Hypocalcemia  - Calcium corrects for low albumin       Thank you for allowing us to participate in the care of this patient. Please call with any questions.       Elizabeth Mobley, DO  [facility]

## 2023-01-24 NOTE — ASSESSMENT & PLAN NOTE
Likely 2/2 to shock  BUN/Cr: 58/1.7 > 60/1.9  UOP: minimal despite aggressive IVF  - HD on today 1/19/23  -  in 24 hr  -Worsened BUN 62/4.1  -Phos 8.5  RIJ temp HD line on 1/14  Still anuric    Plan  -Strict I&Os,maintain euvolemia   -Avoid renal toxic meds   -Continue to monitor renal status and urine output   -Nephro consulted. Appreciate recs. Plan for continued HD   -Bladder scans QShift  -Consult Gen surgery for Tunnel catheter placement, possible 1/24/23  - Will need continued HD outpatient

## 2023-01-24 NOTE — NURSING
Assumed care of patient from ARABELLA Chapa, patient is AAOX4, room air, vitals WNL, no current c/o pain or discomfort, RIJ dated 1/21, ileostomy bag on RLQ,HOB at 30 degrees at all times, going for a tunneled cath in the AM, NPO at midnight,all safety precautions are in place, call bell and tray table are within reach of the patient and will continue to monitor.

## 2023-01-24 NOTE — ANESTHESIA PREPROCEDURE EVALUATION
[facility]  Anesthesia Pre-Operative Evaluation         Patient Name: Lily Green  YOB: 1950  MRN: [id number]    SUBJECTIVE:     Pre-operative evaluation for Procedure(s) (LRB):  INSERTION, CATHETER, HEMODIALYSIS, DUAL LUMEN (N/A)     01/25/2023    Lily Green is a 72 y.o. female w/ a significant PMHx of admission 1/13/23 for AMS, N/V/D s/p discovery of ?colon mass s/p resection and repeat exlap for bleeding now with ATN and acute renal failure who presents for the above procedure.    Pt getting dialyzed currently through temp line in Right IJ.     Labs largely stable    TTE on admit remarkable for EF of 35% with mild MR      Prev airway: Grade I view with Frances 3      Patient Active Problem List   Diagnosis    Type 2 diabetes mellitus with diabetic polyneuropathy, without long-term current use of insulin    Hyperlipidemia, mixed    GERD without esophagitis    Mild recurrent major depression    Primary insomnia    Chronic neck pain    DDD (degenerative disc disease), cervical    Screening mammogram, encounter for    Celiac disease    Hand arthritis    Benign essential tremor    Chronic pain of both knees    Irritable bowel syndrome    TANIA (stress urinary incontinence, female)    Meniere's disease of both ears    Ankle weakness    Psoriasis    Idiopathic neuropathy    Bilateral foot pain    Generalized anxiety disorder    Cognitive complaints    Peritonitis    Shock    Large bowel ischemia    ATN (acute tubular necrosis)    HINA (acute kidney injury)    Acute hypoxemic respiratory failure    Elevated lactic acid level    Hyponatremia    Primary hypertension       Review of patient's allergies indicates:   Allergen Reactions    Crestor [rosuvastatin] Swelling    Gluten protein        Current Inpatient Medications:   sodium chloride 0.9%   Intravenous Once    sodium chloride 0.9%   Intravenous Once    amLODIPine  5 mg  Oral Daily    ceFEPime (MAXIPIME) IVPB  2 g Intravenous Q24H    loperamide  2 mg Oral BID    metronidazole  500 mg Intravenous Q8H       No current facility-administered medications on file prior to encounter.     Current Outpatient Medications on File Prior to Encounter   Medication Sig Dispense Refill    alpha lipoic acid 600 mg Cap       atorvastatin (LIPITOR) 40 MG tablet TAKE 1 TABLET BY MOUTH ONCE DAILY 90 tablet 3    betahistine HCl (BETAHISTINE, BULK, MISC)       blood-glucose meter kit Use to test twice a day 1 each 0    calcium carbonate/vitamin D3 (CALTRATE WITH VITAMIN D3 ORAL) Take by mouth.      cetirizine HCl (ZYRTEC ORAL)       cinnamon bark (CINNAMON ORAL) Take by mouth.      coenzyme Q10 100 mg capsule       cyanocobalamin 1,000 mcg/mL injection Inject 1 mL (1,000 mcg total) into the muscle every 30 days. 10 mL 3    dicyclomine (BENTYL) 10 MG capsule       DULoxetine (CYMBALTA) 30 MG capsule Take 1 capsule (30 mg total) by mouth 2 (two) times daily. 60 capsule 11    econazole nitrate 1 % cream Apply topically 2 (two) times daily. 30 g 2    finasteride (PROSCAR) 5 mg tablet       flaxseed oil 1,000 mg Cap once a day      gluc-condr-om3-dha-epa-fish-st 135-376-46-54 mg Cap once a day      glucosamine HCl 1,500 mg Tab Take 1,500 mg by mouth.      KRILL OIL ORAL Take by mouth.      lancets Misc 1 lancet by Misc.(Non-Drug; Combo Route) route once daily. 200 each 3    meclizine (ANTIVERT) 25 mg tablet Take 1 tablet (25 mg total) by mouth 3 (three) times daily as needed for Dizziness. 20 tablet 0    metFORMIN (GLUCOPHAGE) 500 MG tablet Take 1 tablet (500 mg total) by mouth 3 (three) times daily. 2 po qam, 1 po qhs. (Patient taking differently: Take 500 mg by mouth 2 (two) times daily. 1 po qam, 1 po qhs.) 270 tablet 3    MILK THISTLE ORAL Take by mouth.      pantoprazole (PROTONIX) 40 MG tablet TAKE 1 TABLET BY MOUTH ONCE DAILY  90 tablet 3    pregabalin (LYRICA) 150 MG capsule  Take 1 capsule (150 mg total) by mouth 2 (two) times daily. 60 capsule 0    temazepam (RESTORIL) 30 mg capsule TAKE ONE CAPSULE BY MOUTH NIGHTLY AS NEEDED FOR INSOMNIA 30 capsule 2    triamcinolone acetonide 0.1% (KENALOG) 0.1 % ointment Apply topically 2 (two) times daily as needed (rash). 80 g 11    triamterene-hydrochlorothiazide 37.5-25 mg (MAXZIDE-25) 37.5-25 mg per tablet       vit C,X-Dq-czuaq-lutein-zeaxan (PRESERVISION AREDS 2) 949-731-33-1 mg-unit-mg-mg Cap       vitamins  A,C,E-zinc-copper 14,320-226-200 unit-mg-unit Cap Take 1 tablet by mouth once daily.         Past Surgical History:   Procedure Laterality Date    APPENDECTOMY      BREAST SURGERY      Tags    CARPAL TUNNEL RELEASE Bilateral 09/2017    ,     CLOSURE, COLOSTOMY Left 1/16/2023    Procedure: CLOSURE, COLOSTOMY;  Surgeon: Manuel Javier MD;  Location: [facility] OR;  Service: General;  Laterality: Left;    COLONOSCOPY  04/2018    Normal  ( Juan A)     COLOSTOMY Right 1/16/2023    Procedure: CREATION, COLOSTOMY;  Surgeon: Manuel Javier MD;  Location: [facility] OR;  Service: General;  Laterality: Right;    DILATION AND CURETTAGE OF UTERUS  1986    DUPUYTREN CONTRACTURE RELEASE Bilateral 09/2017    HYSTERECTOMY  1987    BEAU w/ appy, no BSO     INJECTION OF NEUROLYTIC AGENT AROUND LUMBAR SYMPATHETIC NERVE N/A 1/6/2022    Procedure: BLOCK, LUMBAR SYMPATHETIC;  Surgeon: Souleymane Rizo Jr., MD;  Location: [facility] PAIN MGT;  Service: Pain Management;  Laterality: N/A;  no pacemaker   pt is diabetic     INJECTION OF NEUROLYTIC AGENT AROUND LUMBAR SYMPATHETIC NERVE N/A 8/25/2022    Procedure: BLOCK, LUMBAR SYMPATHETIC;  Surgeon: Souleymane Rizo Jr., MD;  Location: [facility] PAIN MGT;  Service: Pain Management;  Laterality: N/A;  diabetic     LAPAROTOMY, EXPLORATORY N/A 1/14/2023    Procedure: LAPAROTOMY, EXPLORATORY;  Surgeon: Manuel Javier MD;  Location: [facility] OR;  Service: General;  Laterality: N/A;    LAPAROTOMY, EXPLORATORY  N/A 1/16/2023    Procedure: LAPAROTOMY, EXPLORATORY;  Surgeon: Manuel Javier MD;  Location: [facility];  Service: General;  Laterality: N/A;    SHOULDER SURGERY  2009 & 2010    right rotator cuff    TONSILLECTOMY      UPPER GASTROINTESTINAL ENDOSCOPY  04/2018       Social History     Socioeconomic History    Marital status:    Tobacco Use    Smoking status: Never    Smokeless tobacco: Never   Substance and Sexual Activity    Alcohol use: No    Drug use: Never    Sexual activity: Not Currently     Partners: Male     Birth control/protection: See Surgical Hx     Comment: :      Social Determinants of Health     Financial Resource Strain: Low Risk     Difficulty of Paying Living Expenses: Not hard at all   Food Insecurity: No Food Insecurity    Worried About Running Out of Food in the Last Year: Never true    Ran Out of Food in the Last Year: Never true   Transportation Needs: No Transportation Needs    Lack of Transportation (Medical): No    Lack of Transportation (Non-Medical): No   Physical Activity: Sufficiently Active    Days of Exercise per Week: 7 days    Minutes of Exercise per Session: 50 min   Stress: Stress Concern Present    Feeling of Stress : Very much   Social Connections: Unknown    Frequency of Communication with Friends and Family: More than three times a week    Frequency of Social Gatherings with Friends and Family: More than three times a week    Active Member of Clubs or Organizations: Yes    Attends Club or Organization Meetings: More than 4 times per year    Marital Status:    Housing Stability: Low Risk     Unable to Pay for Housing in the Last Year: No    Number of Places Lived in the Last Year: 1    Unstable Housing in the Last Year: No       OBJECTIVE:     Vital Signs Range (Last 24H):  Temp:  [36.1 °C (96.9 °F)-36.9 °C (98.4 °F)]   Pulse:  []   Resp:  [16-20]   BP: (143-151)/(67-75)   SpO2:  [96 %-100 %]       CBC:   Recent Labs      01/24/23  0530 01/25/23  0453   WBC 26.27* 23.48*   RBC 2.78* 2.76*   HGB 7.8* 7.7*   HCT 22.9* 22.8*    436   MCV 82 83   MCH 28.1 27.9   MCHC 34.1 33.8       CMP:   Recent Labs     01/24/23  0530 01/25/23  0453   * 130*   K 4.4 4.8    102   CO2 21* 16*   BUN 35* 62*   CREATININE 3.6* 5.2*   * 119*   MG 2.0 2.1   PHOS 3.4 4.3   CALCIUM 8.1* 8.3*   ALBUMIN 1.8* 1.8*   PROT 5.9* 6.2   ALKPHOS 100 96   * 192*   AST 47* 37   BILITOT 0.4 0.5       INR:  No results for input(s): PT, INR, PROTIME, APTT in the last 72 hours.    Diagnostic Studies: No relevant studies.    EKG: No recent studies available.    2D ECHO:  No results found for this or any previous visit.      ASSESSMENT/PLAN:           Pre-op Assessment    I have reviewed the Patient Summary Reports.     I have reviewed the Nursing Notes. I have reviewed the NPO Status.   I have reviewed the Medications.     Review of Systems  Anesthesia Hx:  No problems with previous Anesthesia  History of prior surgery of interest to airway management or planning: Denies Family Hx of Anesthesia complications.   Denies Personal Hx of Anesthesia complications.   Hematology/Oncology:         -- Anemia:   Cardiovascular:   Hypertension Denies Dysrhythmias.   Denies Angina.  Denies EDEN.    Pulmonary:   Denies COPD.  Denies Asthma.  Denies Recent URI.    Renal/:   Chronic Renal Disease, Dialysis, CKD Pending hemodialysis for today    Hepatic/GI:   PUD, GERD S/p ex lap on 1/14 for GIB, with recurrent bleed and another ex lap 1/16   Musculoskeletal:   Arthritis   Spine Disorders: Disc disease and Degenerative disease    Neurological:   Denies CVA. Denies Seizures.   Peripheral Neuropathy    Endocrine:   Diabetes, type 2    Psych:   anxiety depression          Physical Exam  General: Well nourished, Cooperative and Alert  Oriented to self only   Large abrasion on Left forehead   R neck trialysis catheter   Airway:  Mallampati: II   Mouth Opening:  Normal  TM Distance: Normal  Tongue: Normal  Neck ROM: Normal ROM    Dental:  Intact        Anesthesia Plan  Type of Anesthesia, risks & benefits discussed:    Anesthesia Type: Gen Natural Airway, MAC  Intra-op Monitoring Plan: Standard ASA Monitors  Post Op Pain Control Plan: multimodal analgesia and IV/PO Opioids PRN  Induction:  IV  Informed Consent: Informed consent signed with the Patient and all parties understand the risks and agree with anesthesia plan.  All questions answered.   ASA Score: 3  Day of Surgery Review of History & Physical: H&P Update referred to the surgeon/provider.  Anesthesia Plan Notes:     Pt confused in holding area, uncertain of why she needs catheter for HD.  Consent obtained with pt's sister Noreen Saha.     Ready For Surgery From Anesthesia Perspective.     .

## 2023-01-24 NOTE — PLAN OF CARE
GUEVARA sent message to Aileen with [facility] requesting updates. GUEVARA will follow.     GRAYSON Galeano  [phone]     01/24/23 1511   Post-Acute Status   Post-Acute Authorization Placement   Coverage [employer]   Discharge Plan   Discharge Plan A Long-term acute care facility (LTAC)

## 2023-01-24 NOTE — PROGRESS NOTES
Tunneled line moved to tomorrow morning around 10:30 due to limited OR availability today    Manuel Javier M.D., F.A.C.S.  [facility]  [facility]

## 2023-01-24 NOTE — PROGRESS NOTES
"[facility]  Adult Nutrition  Progress Note    SUMMARY       Recommendations    Recommendation:  1. Encourage intake of meals & supplements as tolerated.   2. Monitor weight/labs.   3. RD to continue to follow to monitor po intake    Goals:  Pt will tolerate diet with at least 50-75% intake at meals by RD follow up  Nutrition Goal Status: new  Communication of RD Recs: reviewed with RN (Leo)    Assessment and Plan  * Peritonitis  Contributing Nutrition Diagnosis  Inadequate energy intake    Related to (etiology):   Dx, s/p surgery    Signs and Symptoms (as evidenced by):   Unable to consume po diet, NG tube placed     Interventions:  Collaboration with other providers    Nutrition Diagnosis Status:   Improving (now on Mech Soft diet)      Malnutrition Assessment  Unable to assess nFPE 2/2 pt somnolent at visit      Reason for Assessment  Reason For Assessment: RD follow-up  Diagnosis:  (peritonitis)  Relevant Medical History: HTN, peptic ulcer, HLD, celiac disease, DM, appendectomy, hysterectomy  General Information Comments: Pt currently on dysphagia Mech Soft gluten free diet with 1500ml fluid restriction and Novasource Renal. Noted poor-fair intake at meals. Pt NPO tomorrow for procedure. TF d/c. Receiving HD. Pt very somnolent at visit-unable to assess NFPE. Weight stable since admit. Varun 19- abd incision. s/p expl lap 1/14 and expl lap with colostomy closure/creation 1/16.  Nutrition Discharge Planning: d/c needs to be determined    Nutrition Risk Screen  Nutrition Risk Screen: reduced oral intake over the last month    Nutrition/Diet History  Food Preferences: gluten free  Spiritual, Cultural Beliefs, Gnosticism Practices, Values that Affect Care: yes (self reports being Religious/Mandaeism; important to patient)  Factors Affecting Nutritional Intake: altered gastrointestinal function    Anthropometrics  Temp: 98.7 °F (37.1 °C)  Height Method: Stated  Height: 5' 5" (165.1 cm)  Height (inches): 65 " in  Weight Method: Bed Scale  Weight: 61 kg (134 lb 7.7 oz)  Weight (lb): 134.48 lb  Ideal Body Weight (IBW), Female: 125 lb  % Ideal Body Weight, Female (lb): 107.58 %  BMI (Calculated): 22.4  BMI Grade: 18.5-24.9 - normal  Usual Body Weight (UBW), k kg ()  % Usual Body Weight: 103.61  % Weight Change From Usual Weight: 3.39 %     Lab/Procedures/Meds  Pertinent Labs Reviewed: reviewed  Pertinent Labs Comments: Na 123L, BUN 56H, Crea 5.0H, Ca 8.0L, Alb 1.8L  Pertinent Medications Reviewed: reviewed  Pertinent Medications Comments: heparin    Estimated/Assessed Needs  Weight Used For Calorie Calculations: 61 kg (134 lb 7.7 oz)  Energy Calorie Requirements (kcal): 1830 (30 kcal/kg)  Energy Need Method: Kcal/kg  Protein Requirements: 73g (1.2g/kg)  Weight Used For Protein Calculations: 61 kg (134 lb 7.7 oz)  Estimated Fluid Requirement Method: RDA Method  RDA Method (mL): 1830  CHO Requirement: 200g    Nutrition Prescription Ordered  Current Diet Order: dysphagia Mech Soft gluten free 1500ml fluid    Evaluation of Received Nutrient/Fluid Intake  I/O: 500/1050  Energy Calories Required: not meeting needs  Protein Required: not meeting needs  Fluid Required: not meeting needs  Comments: LBM 1/22  % Intake of Estimated Energy Needs: 25 - 50 %  % Meal Intake: 25 - 50 %    Nutrition Risk  Level of Risk/Frequency of Follow-up:  (2xweekly)     Monitor and Evaluation  Food and Nutrient Intake: enteral nutrition intake  Food and Nutrient Adminstration: diet order, enteral and parenteral nutrition administration  Physical Activity and Function: nutrition-related ADLs and IADLs  Anthropometric Measurements: weight  Biochemical Data, Medical Tests and Procedures: electrolyte and renal panel  Nutrition-Focused Physical Findings: overall appearance     Nutrition Follow-Up  RD Follow-up?: Yes

## 2023-01-24 NOTE — ASSESSMENT & PLAN NOTE
Chronic problem  Intially meds held in setting of chock  Plan  Will consider CCB initiation, amlodipine

## 2023-01-24 NOTE — PROGRESS NOTES
[facility]  Progress Note    Patient Name: Lily Green  MRN: [id number]  Patient Class: IP- Inpatient   Admission Date: 1/13/2023  Length of Stay: 11 days  Attending Physician: Sylvia Dumont MD  Primary Care Provider: Pavel Calixto MD        Subjective:     Principal Problem:Peritonitis        HPI:  Patient is a 73 y/o female with PMHx of diabetes type 2 (treated with metformin), HTN, and celiac disease (following a strict diet) who presented to OSH after falling and injuring her face while getting out of bed. The patient reported experiencing nausea, vomiting, diarrhea, abdominal pain, and weakness starting the day before. She has had a weight loss of 60 pounds over an extended period of time, which may have been partially caused by her diet. The patient's review of systems was otherwise normal. Of note patient is currently taking Lyrica, which has recently been increased. On evaluation at bedside, patient feels good just with mild abdominal pain. No other symptoms. Denies LOC with falls, does not remember if she hit her head.     ED Course: Presented hypotensive and tachycardic. Labs showed elevated WBC of 26.16, Glucose 269, , LA 7, and Beta-hydroxybutyrate 0.6. She was acidotic with ph of 7.282 and AG 21. Rest of the labs unremarkable. CT ab/pel revealed distention of the colon with a large amount of stool and fluid, and a segment of the colon that appeared narrowed with possible thickening of the wall, strongly suspected to be a colonic mass. S/p enema and disimpaction. The patient was given fluids (3 L) and antibiotics and transferred for further evaluation and possible surgery      Overview/Hospital Course:  No notes on file    Interval History: Reports Neuropathic pain in soles of feet and palms    Review of Systems   Constitutional:  Negative for fatigue and fever.   HENT:  Negative for congestion and sore throat.    Eyes:  Negative for visual disturbance.    Respiratory:  Negative for apnea, shortness of breath and wheezing.    Cardiovascular:  Negative for chest pain.   Gastrointestinal:  Positive for abdominal pain and diarrhea. Negative for nausea and vomiting.   Genitourinary:  Negative for dysuria.   Musculoskeletal:  Negative for arthralgias and back pain.   Neurological:  Negative for dizziness and headaches.        Burning neuropathy in palms and soles   Psychiatric/Behavioral: Negative.     Objective:     Vital Signs (Most Recent):  Temp: 97.4 °F (36.3 °C) (01/24/23 0706)  Pulse: 79 (01/24/23 0815)  Resp: 16 (01/24/23 0815)  BP: (!) 170/78 (01/24/23 0706)  SpO2: 97 % (01/24/23 0815)   Vital Signs (24h Range):  Temp:  [97.4 °F (36.3 °C)-98.8 °F (37.1 °C)] 97.4 °F (36.3 °C)  Pulse:  [] 79  Resp:  [16-20] 16  SpO2:  [95 %-98 %] 97 %  BP: (121-170)/(71-91) 170/78     Weight: 61 kg (134 lb 7.7 oz)  Body mass index is 22.38 kg/m².    Intake/Output Summary (Last 24 hours) at 1/24/2023 1005  Last data filed at 1/24/2023 0541  Gross per 24 hour   Intake 1340.19 ml   Output 3450 ml   Net -2109.81 ml      Physical Exam  HENT:      Head: Normocephalic.      Comments: Contusion over left eye     Right Ear: External ear normal.      Left Ear: External ear normal.      Nose: Nose normal.      Mouth/Throat:      Mouth: Mucous membranes are moist.   Eyes:      Pupils: Pupils are equal, round, and reactive to light.   Cardiovascular:      Rate and Rhythm: Normal rate.      Pulses: Normal pulses.   Pulmonary:      Effort: Pulmonary effort is normal.   Abdominal:      General: Abdomen is flat.      Tenderness: There is abdominal tenderness.      Comments: Laparotomy and Ostomy sites clear clean and intact  Profuse liquid output per ileostomy    Skin:     General: Skin is warm.   Neurological:      Mental Status: She is alert.      Comments: Improved mentation and focus  Follows all commands  Improved recall  Slow speech   Psychiatric:      Comments: Some confusion and  possible delusions         Significant Labs: All pertinent labs within the past 24 hours have been reviewed.  BMP:   Recent Labs   Lab 01/24/23  0530   *   *   K 4.4      CO2 21*   BUN 35*   CREATININE 3.6*   CALCIUM 8.1*   MG 2.0     CBC:   Recent Labs   Lab 01/23/23  0459 01/24/23  0530   WBC 32.12* 26.27*   HGB 7.5* 7.8*   HCT 21.7* 22.9*    360     CMP:   Recent Labs   Lab 01/23/23  0459 01/24/23  0530   * 129*   K 4.7 4.4   CL 94* 100   CO2 21* 21*   GLU 96 121*   BUN 56* 35*   CREATININE 5.0* 3.6*   CALCIUM 8.0* 8.1*   PROT 5.6* 5.9*   ALBUMIN 1.8* 1.8*   BILITOT 0.5 0.4   ALKPHOS 106 100   AST 79* 47*   * 257*   ANIONGAP 8 8       Significant Imaging: I have reviewed all pertinent imaging results/findings within the past 24 hours.      Assessment/Plan:      * Peritonitis    Surgery s/p colon resection revealed no evidence of mass  S/P EX lap to stop abdomen artery bleed  No evidence of prior CV disease   Etiology unknown  Bladder pressure wnl  ABG ph 7.319 PCO2 30.7, O2 74 Bicarb 15.8  NG removed 1/20      Plan:  Gen surg consulted. Appreciate recs  - Advance diet to mechanical soft gluten free  - Keep Mg 2, K 4  - Zofran prn for n/v       Primary hypertension  Chronic problem  Intially meds held in setting of chock  Plan  Will consider CCB initiation, amlodipine      Hyponatremia  Patient in renal failure 2/2 ATN  Receiving intermittent HD  Patient may have had excess PO fluids on 1/22/23  Improved to 129 post HD  Plan  Follow nephro Recs  Fluid restriction          Acute hypoxemic respiratory failure  Resolved    ATN (acute tubular necrosis)  Likely 2/2 to shock  BUN/Cr: 58/1.7 > 60/1.9  UOP: minimal despite aggressive IVF  - HD on today 1/19/23  -  in 24 hr  -Worsened BUN 62/4.1  -Phos 8.5  RIJ temp HD line on 1/14  Still anuric    Plan  -Strict I&Os,maintain euvolemia   -Avoid renal toxic meds   -Continue to monitor renal status and urine output   -Nephro  consulted. Appreciate recs. Plan for continued HD   -Bladder scans QShift  -Consult Gen surgery for Tunnel catheter placement, possible 1/24/23  - Will need continued HD outpatient     Large bowel ischemia  #Gram negative Bacteremia  -CT Abd shows possible hematoma but no signs fo acute infection or abscess   - Gram negative rods reported in Blood culture from 1/13  WBC improved to 43 form 45 1/22/23  Plan  - Continue Cefepime, flagyl for total of 14 day course End date 1/27/23  -  All further cultures on 1/17 w/ NGT  - afebrile      Type 2 diabetes mellitus with diabetic polyneuropathy, without long-term current use of insulin  SSI  Detemir 7U nightly  POCT BG QID with meals      VTE Risk Mitigation (From admission, onward)         Ordered     heparin (porcine) injection 2,400 Units  As needed (PRN)         01/17/23 1228     IP VTE HIGH RISK PATIENT  Once         01/14/23 0843     Place sequential compression device  Until discontinued         01/14/23 0843                Discharge Planning   CAMPBELL:      Code Status: Full Code   Is the patient medically ready for discharge?:     Reason for patient still in hospital (select all that apply): Patient trending condition, Laboratory test, Treatment, Consult recommendations and Pending disposition  Discharge Plan A: Long-term acute care facility (LTAC)   Discharge Delays: (!) Post-Acute Set-up          Bal Burr MD  [facility] PGY-2  01/24/2023

## 2023-01-25 LAB
ALBUMIN SERPL BCP-MCNC: 1.8 G/DL (ref 3.5–5.2)
ALP SERPL-CCNC: 96 U/L (ref 55–135)
ALT SERPL W/O P-5'-P-CCNC: 192 U/L (ref 10–44)
ANION GAP SERPL CALC-SCNC: 12 MMOL/L (ref 8–16)
ANISOCYTOSIS BLD QL SMEAR: SLIGHT
AST SERPL-CCNC: 37 U/L (ref 10–40)
BASOPHILS # BLD AUTO: ABNORMAL K/UL (ref 0–0.2)
BASOPHILS NFR BLD: 0 % (ref 0–1.9)
BILIRUB SERPL-MCNC: 0.5 MG/DL (ref 0.1–1)
BUN SERPL-MCNC: 62 MG/DL (ref 8–23)
BURR CELLS BLD QL SMEAR: ABNORMAL
CALCIUM SERPL-MCNC: 8.3 MG/DL (ref 8.7–10.5)
CHLORIDE SERPL-SCNC: 102 MMOL/L (ref 95–110)
CO2 SERPL-SCNC: 16 MMOL/L (ref 23–29)
CREAT SERPL-MCNC: 5.2 MG/DL (ref 0.5–1.4)
DIFFERENTIAL METHOD: ABNORMAL
EOSINOPHIL # BLD AUTO: ABNORMAL K/UL (ref 0–0.5)
EOSINOPHIL NFR BLD: 2 % (ref 0–8)
ERYTHROCYTE [DISTWIDTH] IN BLOOD BY AUTOMATED COUNT: 19.4 % (ref 11.5–14.5)
EST. GFR  (NO RACE VARIABLE): 8 ML/MIN/1.73 M^2
GLUCOSE SERPL-MCNC: 119 MG/DL (ref 70–110)
HCT VFR BLD AUTO: 22.8 % (ref 37–48.5)
HGB BLD-MCNC: 7.7 G/DL (ref 12–16)
HYPOCHROMIA BLD QL SMEAR: ABNORMAL
IMM GRANULOCYTES # BLD AUTO: ABNORMAL K/UL (ref 0–0.04)
IMM GRANULOCYTES NFR BLD AUTO: ABNORMAL % (ref 0–0.5)
LYMPHOCYTES # BLD AUTO: ABNORMAL K/UL (ref 1–4.8)
LYMPHOCYTES NFR BLD: 14 % (ref 18–48)
MAGNESIUM SERPL-MCNC: 2.1 MG/DL (ref 1.6–2.6)
MCH RBC QN AUTO: 27.9 PG (ref 27–31)
MCHC RBC AUTO-ENTMCNC: 33.8 G/DL (ref 32–36)
MCV RBC AUTO: 83 FL (ref 82–98)
MONOCYTES # BLD AUTO: ABNORMAL K/UL (ref 0.3–1)
MONOCYTES NFR BLD: 7 % (ref 4–15)
NEUTROPHILS NFR BLD: 76 % (ref 38–73)
NEUTS BAND NFR BLD MANUAL: 1 %
NRBC BLD-RTO: 0 /100 WBC
OVALOCYTES BLD QL SMEAR: ABNORMAL
PHOSPHATE SERPL-MCNC: 4.3 MG/DL (ref 2.7–4.5)
PLATELET # BLD AUTO: 436 K/UL (ref 150–450)
PLATELET BLD QL SMEAR: ABNORMAL
PMV BLD AUTO: 9.7 FL (ref 9.2–12.9)
POCT GLUCOSE: 119 MG/DL (ref 70–110)
POCT GLUCOSE: 135 MG/DL (ref 70–110)
POCT GLUCOSE: 159 MG/DL (ref 70–110)
POIKILOCYTOSIS BLD QL SMEAR: SLIGHT
POLYCHROMASIA BLD QL SMEAR: ABNORMAL
POTASSIUM SERPL-SCNC: 4.8 MMOL/L (ref 3.5–5.1)
PROT SERPL-MCNC: 6.2 G/DL (ref 6–8.4)
RBC # BLD AUTO: 2.76 M/UL (ref 4–5.4)
SODIUM SERPL-SCNC: 130 MMOL/L (ref 136–145)
WBC # BLD AUTO: 23.48 K/UL (ref 3.9–12.7)

## 2023-01-25 PROCEDURE — 25000003 PHARM REV CODE 250

## 2023-01-25 PROCEDURE — 84100 ASSAY OF PHOSPHORUS: CPT | Performed by: STUDENT IN AN ORGANIZED HEALTH CARE EDUCATION/TRAINING PROGRAM

## 2023-01-25 PROCEDURE — 97116 GAIT TRAINING THERAPY: CPT

## 2023-01-25 PROCEDURE — 25000003 PHARM REV CODE 250: Performed by: SURGERY

## 2023-01-25 PROCEDURE — 36558 INSERT TUNNELED CV CATH: CPT | Mod: 79,RT,, | Performed by: SURGERY

## 2023-01-25 PROCEDURE — 36000706: Performed by: SURGERY

## 2023-01-25 PROCEDURE — S0030 INJECTION, METRONIDAZOLE: HCPCS | Performed by: SURGERY

## 2023-01-25 PROCEDURE — D9220A PRA ANESTHESIA: Mod: ANES,,, | Performed by: STUDENT IN AN ORGANIZED HEALTH CARE EDUCATION/TRAINING PROGRAM

## 2023-01-25 PROCEDURE — D9220A PRA ANESTHESIA: ICD-10-PCS | Mod: ANES,,, | Performed by: STUDENT IN AN ORGANIZED HEALTH CARE EDUCATION/TRAINING PROGRAM

## 2023-01-25 PROCEDURE — 85027 COMPLETE CBC AUTOMATED: CPT | Performed by: FAMILY MEDICINE

## 2023-01-25 PROCEDURE — 63600175 PHARM REV CODE 636 W HCPCS: Performed by: SURGERY

## 2023-01-25 PROCEDURE — 80053 COMPREHEN METABOLIC PANEL: CPT | Performed by: STUDENT IN AN ORGANIZED HEALTH CARE EDUCATION/TRAINING PROGRAM

## 2023-01-25 PROCEDURE — 77001 FLUOROGUIDE FOR VEIN DEVICE: CPT | Mod: 26,,, | Performed by: SURGERY

## 2023-01-25 PROCEDURE — 36558 PR INSERT TUNNELED CV CATH W/O PORT OR PUMP: ICD-10-PCS | Mod: 79,RT,, | Performed by: SURGERY

## 2023-01-25 PROCEDURE — 83735 ASSAY OF MAGNESIUM: CPT | Performed by: STUDENT IN AN ORGANIZED HEALTH CARE EDUCATION/TRAINING PROGRAM

## 2023-01-25 PROCEDURE — D9220A PRA ANESTHESIA: Mod: CRNA,,, | Performed by: NURSE ANESTHETIST, CERTIFIED REGISTERED

## 2023-01-25 PROCEDURE — 11000001 HC ACUTE MED/SURG PRIVATE ROOM

## 2023-01-25 PROCEDURE — 37000008 HC ANESTHESIA 1ST 15 MINUTES: Performed by: SURGERY

## 2023-01-25 PROCEDURE — 97110 THERAPEUTIC EXERCISES: CPT

## 2023-01-25 PROCEDURE — S0030 INJECTION, METRONIDAZOLE: HCPCS

## 2023-01-25 PROCEDURE — 97530 THERAPEUTIC ACTIVITIES: CPT

## 2023-01-25 PROCEDURE — 36000707: Performed by: SURGERY

## 2023-01-25 PROCEDURE — D9220A PRA ANESTHESIA: ICD-10-PCS | Mod: CRNA,,, | Performed by: NURSE ANESTHETIST, CERTIFIED REGISTERED

## 2023-01-25 PROCEDURE — 37000009 HC ANESTHESIA EA ADD 15 MINS: Performed by: SURGERY

## 2023-01-25 PROCEDURE — C1750 CATH, HEMODIALYSIS,LONG-TERM: HCPCS | Performed by: SURGERY

## 2023-01-25 PROCEDURE — 63600175 PHARM REV CODE 636 W HCPCS

## 2023-01-25 PROCEDURE — 77001 CHG FLUOROGUIDE CNTRL VEN ACCESS,PLACE,REPLACE,REMOVE: ICD-10-PCS | Mod: 26,,, | Performed by: SURGERY

## 2023-01-25 PROCEDURE — 63600175 PHARM REV CODE 636 W HCPCS: Performed by: NURSE ANESTHETIST, CERTIFIED REGISTERED

## 2023-01-25 PROCEDURE — 71000033 HC RECOVERY, INTIAL HOUR: Performed by: SURGERY

## 2023-01-25 PROCEDURE — 90935 HEMODIALYSIS ONE EVALUATION: CPT

## 2023-01-25 PROCEDURE — 25000003 PHARM REV CODE 250: Performed by: NURSE ANESTHETIST, CERTIFIED REGISTERED

## 2023-01-25 PROCEDURE — 85007 BL SMEAR W/DIFF WBC COUNT: CPT | Performed by: FAMILY MEDICINE

## 2023-01-25 DEVICE — CATH DURA-MAX 15.5F X 24CM
Type: IMPLANTABLE DEVICE | Site: NECK | Status: NON-FUNCTIONAL
Removed: 2023-01-28

## 2023-01-25 RX ORDER — LIDOCAINE HYDROCHLORIDE 10 MG/ML
INJECTION INFILTRATION; PERINEURAL
Status: DISCONTINUED | OUTPATIENT
Start: 2023-01-25 | End: 2023-01-25 | Stop reason: HOSPADM

## 2023-01-25 RX ORDER — HEPARIN SODIUM 1000 [USP'U]/ML
INJECTION, SOLUTION INTRAVENOUS; SUBCUTANEOUS
Status: DISCONTINUED | OUTPATIENT
Start: 2023-01-25 | End: 2023-01-25 | Stop reason: HOSPADM

## 2023-01-25 RX ORDER — AMLODIPINE BESYLATE 5 MG/1
5 TABLET ORAL DAILY
Status: DISCONTINUED | OUTPATIENT
Start: 2023-01-25 | End: 2023-01-26 | Stop reason: HOSPADM

## 2023-01-25 RX ORDER — ETOMIDATE 2 MG/ML
INJECTION INTRAVENOUS
Status: DISCONTINUED | OUTPATIENT
Start: 2023-01-25 | End: 2023-01-25

## 2023-01-25 RX ORDER — KETAMINE HCL IN 0.9 % NACL 50 MG/5 ML
SYRINGE (ML) INTRAVENOUS
Status: DISCONTINUED | OUTPATIENT
Start: 2023-01-25 | End: 2023-01-25

## 2023-01-25 RX ORDER — LIDOCAINE HYDROCHLORIDE 20 MG/ML
INJECTION INTRAVENOUS
Status: DISCONTINUED | OUTPATIENT
Start: 2023-01-25 | End: 2023-01-25

## 2023-01-25 RX ORDER — BUPIVACAINE HYDROCHLORIDE 5 MG/ML
INJECTION, SOLUTION PERINEURAL
Status: DISCONTINUED | OUTPATIENT
Start: 2023-01-25 | End: 2023-01-25 | Stop reason: HOSPADM

## 2023-01-25 RX ORDER — PROPOFOL 10 MG/ML
VIAL (ML) INTRAVENOUS
Status: DISCONTINUED | OUTPATIENT
Start: 2023-01-25 | End: 2023-01-25

## 2023-01-25 RX ADMIN — CEFEPIME 2 G: 2 INJECTION, POWDER, FOR SOLUTION INTRAVENOUS at 11:01

## 2023-01-25 RX ADMIN — Medication 5 MG: at 02:01

## 2023-01-25 RX ADMIN — METRONIDAZOLE 500 MG: 5 INJECTION, SOLUTION INTRAVENOUS at 12:01

## 2023-01-25 RX ADMIN — AMLODIPINE BESYLATE 5 MG: 5 TABLET ORAL at 06:01

## 2023-01-25 RX ADMIN — LIDOCAINE HYDROCHLORIDE 50 MG: 20 INJECTION, SOLUTION INTRAVENOUS at 02:01

## 2023-01-25 RX ADMIN — PROPOFOL 10 MG: 10 INJECTION, EMULSION INTRAVENOUS at 02:01

## 2023-01-25 RX ADMIN — METRONIDAZOLE 500 MG: 5 INJECTION, SOLUTION INTRAVENOUS at 06:01

## 2023-01-25 RX ADMIN — SODIUM CHLORIDE: 0.9 INJECTION, SOLUTION INTRAVENOUS at 02:01

## 2023-01-25 RX ADMIN — ETOMIDATE 4 MG: 2 INJECTION, SOLUTION INTRAVENOUS at 02:01

## 2023-01-25 RX ADMIN — METRONIDAZOLE 500 MG: 5 INJECTION, SOLUTION INTRAVENOUS at 08:01

## 2023-01-25 RX ADMIN — ETOMIDATE 2 MG: 2 INJECTION, SOLUTION INTRAVENOUS at 02:01

## 2023-01-25 NOTE — SUBJECTIVE & OBJECTIVE
Interval History:   No acute issues overnight. Afebrile. VSS  Pain controlled well  no nausea / vomiting  (+) bowel function  To OR for tunneled line today      Medications:  Continuous Infusions:  Scheduled Meds:   sodium chloride 0.9%   Intravenous Once    sodium chloride 0.9%   Intravenous Once    ceFEPime (MAXIPIME) IVPB  2 g Intravenous Q24H    loperamide  2 mg Oral BID    metronidazole  500 mg Intravenous Q8H     PRN Meds:dextrose 10%, dextrose 10%, dextrose 10%, dicyclomine, glucagon (human recombinant), glucose, glucose, heparin (porcine), hydrALAZINE, HYDROcodone-acetaminophen, influenza 65up-adj, insulin aspart U-100, LIDOcaine, melatonin, ondansetron, promethazine, sodium chloride 0.9%, sodium chloride 0.9%, sodium chloride 0.9%     Review of patient's allergies indicates:   Allergen Reactions    Crestor [rosuvastatin] Swelling    Gluten protein      Objective:     Vital Signs (Most Recent):  Temp: 97.9 °F (36.6 °C) (01/25/23 0720)  Pulse: 86 (01/25/23 0720)  Resp: 16 (01/25/23 0720)  BP: (!) 151/72 (01/25/23 0720)  SpO2: 96 % (01/25/23 0720)   Vital Signs (24h Range):  Temp:  [96.9 °F (36.1 °C)-98.4 °F (36.9 °C)] 97.9 °F (36.6 °C)  Pulse:  [] 86  Resp:  [16-20] 16  SpO2:  [96 %-100 %] 96 %  BP: (143-166)/(72-77) 151/72     Weight: 68.5 kg (151 lb 0.2 oz)  Body mass index is 25.13 kg/m².    Intake/Output - Last 3 Shifts         01/23 0700 01/24 0659 01/24 0700 01/25 0659 01/25 0700 01/26 0659    P.O. 285      Other 500      IV Piggyback 555.2      Total Intake(mL/kg) 1340.2 (22)      Urine (mL/kg/hr) 0 (0) 0 (0)     Other 3000      Stool 450 100 300    Total Output 3450 100 300    Net -2109.8 -100 -300           Urine Occurrence 0 x 0 x     Stool Occurrence 0 x 0 x             Physical Exam  Constitutional:       General: She is not in acute distress.     Appearance: She is not ill-appearing or toxic-appearing.   Cardiovascular:      Rate and Rhythm: Normal rate and regular rhythm.   Pulmonary:       Effort: Pulmonary effort is normal. No respiratory distress.   Abdominal:      General: There is no distension.      Palpations: Abdomen is soft.      Tenderness: There is no abdominal tenderness. There is no guarding.      Hernia: No hernia is present.      Comments: Midline incision C/D/I with staples in place   LLQ incision C/D/I with staples in place   RLQ ostomy pink, patient and viable with air and stool in bag    Skin:     Capillary Refill: Capillary refill takes less than 2 seconds.   Neurological:      Mental Status: She is alert. Mental status is at baseline.       Significant Labs:  I have reviewed all pertinent lab results within the past 24 hours.  CBC:   Recent Labs   Lab 01/25/23  0453   WBC 23.48*   RBC 2.76*   HGB 7.7*   HCT 22.8*      MCV 83   MCH 27.9   MCHC 33.8     CMP:   Recent Labs   Lab 01/25/23  0453   *   CALCIUM 8.3*   ALBUMIN 1.8*   PROT 6.2   *   K 4.8   CO2 16*      BUN 62*   CREATININE 5.2*   ALKPHOS 96   *   AST 37   BILITOT 0.5       Significant Diagnostics:  I have reviewed all pertinent imaging results/findings within the past 24 hours.

## 2023-01-25 NOTE — HOSPITAL COURSE
Patient with pmhx of HTN T2DM, Meneirs syndrome and Celiac disease presented to [facility] following a fall. Patient found tto be in minimal distress.  But was found to be hypotensive and tachycardic. Labs showed elevated WBC of 26.16, Glucose 269, , LA 7, and Beta-hydroxybutyrate 0.6. She was acidotic with ph of 7.282 and AG 21. Rest of the labs unremarkable. CT ab/pel revealed distention of the colon with a large amount of stool and fluid, and a segment of the colon that appeared narrowed with possible thickening of the wall, strongly suspected to be a colonic mass. S/p enema and disimpaction. The patient was given fluids (3 L) and antibiotics and transferred for further evaluation and possible surgery. Transferred to [facility] for surgery.  Patient arrived to ICU with minimal symptoms. Overnight patient developed acute severe peritonities. Ex lap revealed severe ischemic bowel with necrosis. Patient underwent total colectomy. Returned to ICU for recovery. Intially stable and recovering well overnight. In AM CBC revealed 5 point drop in hemoglobin to 6.3. Bedside ultrasound showed fluid collection. CT abdomen confrimed brisk bleed. Patient returned to OR for ex lap. Bleed stopped in OR. Patient returned to ICU. Patient showed ATN on admit and required intiation of CRRT via trialysis line. Patient arrived anuric and remaiend so as of 1/25/23. Over course of 3 days and regular dialysis patient mentation and physical status improved. Patient was deemed stable and stepped down to floor on 1/21/23. Nephrology advises continued HD. Tunnel catheter placed by general surgery. HD stopped early on day of line placement due to blockage. This is suspected to be from vasospasm. HD occurred successfully on 1/26/23. Nephrology okay to follow-up with patient during stay at LTAC. Patient to continue MWF dialysis. Patient to continue antibiotics for 4 more days to complete her 14 day course. Patient transported  to [facility] for PT/OT and antibiotic treatment and continued hemodialysis.

## 2023-01-25 NOTE — PROGRESS NOTES
"[place] [facility]  Adult Nutrition  Progress Note    SUMMARY       Recommendations    Recommendation:   1. When medically acceptable, advance to previous diet of mech soft diet gluten free.   2. Monitor wt and labs.   3. RD to follow to monitor nutriton status (Simultaneous filing. User may not have seen previous data.)    Goals:   1. Pt will tolerate diet with at least 50-75% intake at meals by RD follow up.    Nutrition Goal Status: progressing towards goal  Communication of RD Recs: other (comment) (POC)    Assessment and Plan  * Peritonitis  Contributing Nutrition Diagnosis  Inadequate energy intake    Related to (etiology):   Dx, s/p surgery    Signs and Symptoms (as evidenced by):   Unable to consume po diet, NG tube placed     Interventions:  Collaboration with other providers    Nutrition Diagnosis Status:   Improving (now on Mech Soft diet)    Malnutrition Assessment  Unable to assess NFPE at visit.       Reason for Assessment  Reason For Assessment: RD follow-up  Diagnosis:  (peritonitis)  Relevant Medical History: HTN, peptic ulcer, HLD, celiac disease, DM, appendectomy, hysterectomy  General Information Comments: Pt currently NPO for tunneled line. Pt previously on mech soft diet gluten free diet with 1500 mL fluid restriction and Novasource Renal. Pt was able to consume 75% of meal yesterday. Varun-14 abd incision. s/p expl lap 1/14 and expl lap with colostomy closure/creation 1/16. Unable to assess NFPE at visit.  Nutrition Discharge Planning: D/C needs to be determined    Nutrition Risk Screen  Nutrition Risk Screen: no indicators present    Nutrition/Diet History  Patient Reported Diet/Restrictions/Preferences: no oral intake  Food Preferences: gluten free  Spiritual, Cultural Beliefs, Yarsani Practices, Values that Affect Care: no  Factors Affecting Nutritional Intake: altered gastrointestinal function    Anthropometrics  Temp: 98.2 °F (36.8 °C)  Height Method: Stated  Height: 5' 4.96" (165 " cm)  Height (inches): 64.96 in  Weight Method: Bed Scale  Weight: 68.5 kg (151 lb)  Weight (lb): 151 lb  Ideal Body Weight (IBW), Female: 124.8 lb  % Ideal Body Weight, Female (lb): 120.99 %  BMI (Calculated): 25.2  BMI Grade: 25 - 29.9 - overweight  Usual Body Weight (UBW), k kg ()  % Usual Body Weight: 103.61  % Weight Change From Usual Weight: 3.39 %       Lab/Procedures/Meds  Pertinent Labs Reviewed: reviewed  Pertinent Labs Comments: Epi945Y,  CO2 16L,  Ssm849V,  BUN62H,  Cre5.2H,  Cal8.3L  Pertinent Medications Reviewed: reviewed  Pertinent Medications Comments: NaCl infusion,  cefeplme, loperamide,  metronidazole,      Estimated/Assessed Needs  Weight Used For Calorie Calculations: 61 kg (134 lb 7.7 oz)  Energy Calorie Requirements (kcal): 1830 (30 kcal)  Energy Need Method: Kcal/kg  Protein Requirements: 73.35 (1.2g)  Weight Used For Protein Calculations: 61 kg (134 lb 7.7 oz)     Estimated Fluid Requirement Method: RDA Method  RDA Method (mL): 1830  CHO Requirement: 200g    Nutrition Prescription Ordered  Current Diet Order: NPO (For tunneled line)  Oral Nutrition Supplement: Novasource Renal    Evaluation of Received Nutrient/Fluid Intake  I/O: 0/300  Energy Calories Required: not meeting needs  Protein Required: not meeting needs  Fluid Required: not meeting needs  Comments: LBM: /  % Intake of Estimated Energy Needs: 0 - 25 %  % Meal Intake: 0 - 25 %    Nutrition Risk  Level of Risk/Frequency of Follow-up:  (2x per week)     Monitor and Evaluation  Food and Nutrient Intake: other (specify) (NPO)  Food and Nutrient Adminstration: diet order, enteral and parenteral nutrition administration  Physical Activity and Function: nutrition-related ADLs and IADLs  Anthropometric Measurements: weight  Biochemical Data, Medical Tests and Procedures: electrolyte and renal panel  Nutrition-Focused Physical Findings: overall appearance     Nutrition Follow-Up  RD Follow-up?: Yes      Lin Ibarra  Dietetic Intern    I certify that I, Betina Moore RD, directed the dietetic intern in service delivery and guided them using my skilled judgment. As the cosigning dietitian, I have reviewed the dietetic interns documentation and am responsible for the treatment, assessment, and plan.

## 2023-01-25 NOTE — SUBJECTIVE & OBJECTIVE
Interval History: NAEON    Review of Systems   Constitutional:  Positive for fatigue. Negative for fever.   HENT:  Negative for congestion and sore throat.    Eyes:  Negative for visual disturbance.   Respiratory:  Negative for apnea, shortness of breath and wheezing.    Cardiovascular:  Negative for chest pain.   Gastrointestinal:  Positive for abdominal pain and diarrhea. Negative for nausea and vomiting.   Genitourinary:  Negative for dysuria.   Musculoskeletal:  Negative for arthralgias and back pain.   Neurological:  Negative for dizziness and headaches.        Burning neuropathy in palms and soles   Psychiatric/Behavioral: Negative.     Objective:     Vital Signs (Most Recent):  Temp: 97.9 °F (36.6 °C) (01/25/23 0720)  Pulse: 86 (01/25/23 0720)  Resp: 16 (01/25/23 0720)  BP: (!) 151/72 (01/25/23 0720)  SpO2: 96 % (01/25/23 0720)   Vital Signs (24h Range):  Temp:  [96.9 °F (36.1 °C)-98.4 °F (36.9 °C)] 97.9 °F (36.6 °C)  Pulse:  [] 86  Resp:  [16-20] 16  SpO2:  [96 %-100 %] 96 %  BP: (143-166)/(72-77) 151/72     Weight: 68.5 kg (151 lb 0.2 oz)  Body mass index is 25.13 kg/m².    Intake/Output Summary (Last 24 hours) at 1/25/2023 1003  Last data filed at 1/25/2023 0859  Gross per 24 hour   Intake --   Output 400 ml   Net -400 ml      Physical Exam  HENT:      Head: Normocephalic.      Comments: Contusion over left eye     Right Ear: External ear normal.      Left Ear: External ear normal.      Nose: Nose normal.      Mouth/Throat:      Mouth: Mucous membranes are moist.   Eyes:      Pupils: Pupils are equal, round, and reactive to light.   Cardiovascular:      Rate and Rhythm: Normal rate.      Pulses: Normal pulses.   Pulmonary:      Effort: Pulmonary effort is normal.   Abdominal:      General: Abdomen is flat.      Tenderness: There is abdominal tenderness.      Comments: Laparotomy and Ostomy sites clear clean and intact  Profuse liquid output per ileostomy    Skin:     General: Skin is warm.    Neurological:      Mental Status: She is alert.      Comments: Improved mentation and focus  Follows all commands  Improved recall  Slow speech   Psychiatric:      Comments: Some confusion        Significant Labs: All pertinent labs within the past 24 hours have been reviewed.  BMP:   Recent Labs   Lab 01/25/23  0453   *   *   K 4.8      CO2 16*   BUN 62*   CREATININE 5.2*   CALCIUM 8.3*   MG 2.1     CBC:   Recent Labs   Lab 01/24/23  0530 01/25/23  0453   WBC 26.27* 23.48*   HGB 7.8* 7.7*   HCT 22.9* 22.8*    436     CMP:   Recent Labs   Lab 01/24/23  0530 01/25/23  0453   * 130*   K 4.4 4.8    102   CO2 21* 16*   * 119*   BUN 35* 62*   CREATININE 3.6* 5.2*   CALCIUM 8.1* 8.3*   PROT 5.9* 6.2   ALBUMIN 1.8* 1.8*   BILITOT 0.4 0.5   ALKPHOS 100 96   AST 47* 37   * 192*   ANIONGAP 8 12       Significant Imaging: I have reviewed all pertinent imaging results/findings within the past 24 hours.

## 2023-01-25 NOTE — PLAN OF CARE
Problem: Adult Inpatient Plan of Care  Goal: Plan of Care Review  Outcome: Ongoing, Progressing  Goal: Patient-Specific Goal (Individualized)  Outcome: Ongoing, Progressing  Goal: Absence of Hospital-Acquired Illness or Injury  Outcome: Ongoing, Progressing  Goal: Optimal Comfort and Wellbeing  Outcome: Ongoing, Progressing  Goal: Readiness for Transition of Care  Outcome: Ongoing, Progressing     Problem: Diabetes Comorbidity  Goal: Blood Glucose Level Within Targeted Range  Outcome: Ongoing, Progressing     Problem: Infection  Goal: Absence of Infection Signs and Symptoms  Outcome: Ongoing, Progressing     Problem: Skin Injury Risk Increased  Goal: Skin Health and Integrity  Outcome: Ongoing, Progressing     Problem: Fall Injury Risk  Goal: Absence of Fall and Fall-Related Injury  Outcome: Ongoing, Progressing     Problem: Device-Related Complication Risk (Hemodialysis)  Goal: Safe, Effective Therapy Delivery  Outcome: Ongoing, Progressing     Problem: Hemodynamic Instability (Hemodialysis)  Goal: Effective Tissue Perfusion  Outcome: Ongoing, Progressing     Problem: Infection (Hemodialysis)  Goal: Absence of Infection Signs and Symptoms  Outcome: Ongoing, Progressing     Problem: Fluid and Electrolyte Imbalance (Acute Kidney Injury/Impairment)  Goal: Fluid and Electrolyte Balance  Outcome: Ongoing, Progressing     Problem: Oral Intake Inadequate (Acute Kidney Injury/Impairment)  Goal: Optimal Nutrition Intake  Outcome: Ongoing, Progressing     Problem: Renal Function Impairment (Acute Kidney Injury/Impairment)  Goal: Effective Renal Function  Outcome: Ongoing, Progressing     Problem: Adjustment to Illness (Delirium)  Goal: Optimal Coping  Outcome: Ongoing, Progressing     Problem: Altered Behavior (Delirium)  Goal: Improved Behavioral Control  Outcome: Ongoing, Progressing     Problem: Attention and Thought Clarity Impairment (Delirium)  Goal: Improved Attention and Thought Clarity  Outcome: Ongoing,  Progressing     Problem: Sleep Disturbance (Delirium)  Goal: Improved Sleep  Outcome: Ongoing, Progressing     Problem: Adjustment to Surgery (Colostomy)  Goal: Optimal Coping  Outcome: Ongoing, Progressing     Problem: Bleeding (Colostomy)  Goal: Absence of Bleeding  Outcome: Ongoing, Progressing     Problem: Fluid, Electrolyte and Nutrition Imbalance (Colostomy)  Goal: Fluid, Electrolyte and Nutrition Balance  Outcome: Ongoing, Progressing     Problem: Infection (Colostomy)  Goal: Absence of Infection Signs and Symptoms  Outcome: Ongoing, Progressing     Problem: Ongoing Anesthesia Effects (Colostomy)  Goal: Anesthesia/Sedation Recovery  Outcome: Ongoing, Progressing     Problem: Pain (Colostomy)  Goal: Acceptable Pain Control  Outcome: Ongoing, Progressing     Problem: Postoperative Nausea and Vomiting (Colostomy)  Goal: Nausea and Vomiting Relief  Outcome: Ongoing, Progressing     Problem: Postoperative Stoma Care (Colostomy)  Goal: Optimal Stoma Healing  Outcome: Ongoing, Progressing     Problem: Postoperative Urinary Retention (Colostomy)  Goal: Effective Urinary Elimination  Outcome: Ongoing, Progressing     Problem: Respiratory Compromise (Colostomy)  Goal: Effective Oxygenation and Ventilation  Outcome: Ongoing, Progressing     Problem: Confusion Acute  Goal: Optimal Cognitive Function  Outcome: Ongoing, Progressing

## 2023-01-25 NOTE — ANESTHESIA POSTPROCEDURE EVALUATION
Anesthesia Post Evaluation    Patient: Lily Green    Procedure(s) Performed: Procedure(s) (LRB):  INSERTION, CATHETER, HEMODIALYSIS, DUAL LUMEN (Right)  REMOVAL, TUNNELED CATH (Right)    Final Anesthesia Type: general      Patient location during evaluation: PACU  Patient participation: Yes- Able to Participate  Level of consciousness: awake and alert  Post-procedure vital signs: reviewed and stable  Pain management: adequate  Airway patency: patent    PONV status at discharge: No PONV  Anesthetic complications: no      Cardiovascular status: blood pressure returned to baseline and stable  Respiratory status: unassisted, spontaneous ventilation and room air  Hydration status: euvolemic  Follow-up not needed.          Vitals Value Taken Time   /70 01/25/23 1525   Temp 36.1 °C (97 °F) 01/25/23 1521   Pulse 82 01/25/23 1525   Resp 36 01/25/23 1525   SpO2 99 % 01/25/23 1525   Vitals shown include unvalidated device data.      No case tracking events are documented in the log.      Pain/Meredith Score: Pain Rating Prior to Med Admin: 4 (1/24/2023  9:00 AM)  Pain Rating Post Med Admin: 0 (1/24/2023  7:06 AM)

## 2023-01-25 NOTE — ASSESSMENT & PLAN NOTE
Surgery s/p colon resection revealed no evidence of mass  S/P EX lap to stop abdomen artery bleed  No evidence of prior CV disease   Etiology unknown  Bladder pressure wnl  ABG ph 7.319 PCO2 30.7, O2 74 Bicarb 15.8  NG removed 1/20    Resolved  Plan:  Gen surg consulted. Appreciate recs  - Advance diet to mechanical soft gluten free  - Keep Mg 2, K 4  - Zofran prn for n/v

## 2023-01-25 NOTE — PLAN OF CARE
Guevara sent message to Mylene with [facility], she confirmed pt has auth and they they will just need line placed and d/c orders. Guevara pending additional response from Mylene. GUEVARA will follow.     GRAYSON Galeano  [phone]     01/25/23 1233   Post-Acute Status   Post-Acute Authorization Placement

## 2023-01-25 NOTE — PROGRESS NOTES
[facility] Progress Note    Subjective:      Ms. Green is feeling ok today. Still not talking very much but does participate in our conversation. Will go for tunneled line today and HD after. Remains anuric.      Objective:   Last 24 Hour Vital Signs:  BP  Min: 143/73  Max: 166/77  Temp  Av.7 °F (36.5 °C)  Min: 96.9 °F (36.1 °C)  Max: 98.4 °F (36.9 °C)  Pulse  Av.2  Min: 86  Max: 100  Resp  Av.5  Min: 16  Max: 20  SpO2  Av.6 %  Min: 96 %  Max: 100 %  Weight  Av.5 kg (151 lb 0.2 oz)  Min: 68.5 kg (151 lb 0.2 oz)  Max: 68.5 kg (151 lb 0.2 oz)  I/O last 3 completed shifts:  In: 185 [P.O.:185]  Out: 400 [Stool:400]    Physical Examination:  Gen: awake, alert, NAD  HEENT: normocephalic, EOMI, MMM, healing scabs on lips  CV: RRR, no murmurs appreciated  Lungs: CTAB  GI: Soft, non tender, ostomy present   Extrem: no edema, clubbing, or cyanosis  Skin: dry, warm, intact.   Neuro: talking, moving all extremities     Laboratory:  Recent Labs   Lab 23  0459 23  0530 23  0453   WBC 32.12* 26.27* 23.48*   HGB 7.5* 7.8* 7.7*   HCT 21.7* 22.9* 22.8*    360 436   * 129* 130*   K 4.7 4.4 4.8   CL 94* 100 102   CREATININE 5.0* 3.6* 5.2*   BUN 56* 35* 62*   CO2 21* 21* 16*   * 257* 192*   AST 79* 47* 37         Radiology:  Imaging has been reviewed personally.       Assessment and Plan:     Lily Green is a 72 y.o. woman with a history of celiac disease, DM2 well controlled, HTN, anxiety presented after having a fall. Noted to have severe lactic acidosis with imaging concerning for ischemic colitis. Became hypotensive and started on levophed and vasopressin for shock. On arrival to [place] taken to OR for colectomy on . UOP severely diminished and essentially became anuric with only 50ml UOP. [facility] consulted for severe metabolic acidosis with pH 7.14 and HINA in setting of septic shock and ischemic colitis. HD initiated  and had one session  with improvement in acidosis. Developed hemorrhagic shock with bleeding into the abdomen 1/16 and taken back to OR for exlap and ileostomy creation. CRRT initiated 1/16, now off pressors and back on iHD.      Stage III Anuric HINA  - baseline creatinine 0.8  - presenting in shock on pressors with ischemic colitis s/p colectomy 1/14 and continued to have refractory acidosis and minimal UOP. On 1/16 developed hemorrhagic shock causing another ischemic insult.  - ATN with 2 separate insults will likely take several weeks to months to recover  - HD initiated 1/14; CRRT initiated 1/16 and discontinued 1/17; iHD resumed 1/17  - remains anuric  - continue HD on MWF schedule  - tunneled line today   - renally dose all medications      Anion Gap Metabolic Acidosis  - bicarb 16 today and expect to improve with dialysis      Hyponatremia  -  most consistent with hypervolemia, Na 130 today and will have HD today      Hyperphosphatemia  - phos initially high but now on regular HD and lower. Phos today 4.3 and stable. No need for binder at this time.      Hypocalcemia  - Calcium corrects for low albumin       Thank you for allowing us to participate in the care of this patient. Please call with any questions.       Elizabeth Mobley, DO  [facility]

## 2023-01-25 NOTE — PLAN OF CARE
Recommendation:   1. When medically acceptable, advance to previous diet of [facility] soft diet gluten free.   2. Monitor wt and labs.   3. RD to follow to monitor nutriton status (Simultaneous filing. User may not have seen previous data.)    Goals:   1. Pt will tolerate diet with at least 50-75% intake at meals by RD follow up.

## 2023-01-25 NOTE — ASSESSMENT & PLAN NOTE
71 yo female now s/p total abdominal colectomy with end ileostomy, takeback for bleeding on 1/16, now stable and off pressors, stepped down to the floor.    NPO currently - To OR today for tunneled line  Multimodal pain control  Continue BID Imodium for ostomy output, improved  Continue PT/OT  Encourage IS use   Rest of care per primary

## 2023-01-25 NOTE — TRANSFER OF CARE
"Anesthesia Transfer of Care Note    Patient: Lily Green    Procedure(s) Performed: Procedure(s) (LRB):  INSERTION, CATHETER, HEMODIALYSIS, DUAL LUMEN (Right)  REMOVAL, TUNNELED CATH (Right)    Patient location: PACU    Anesthesia Type: general    Transport from OR: Transported from OR on 2-3 L/min O2 by NC with adequate spontaneous ventilation    Post pain: adequate analgesia    Post assessment: no apparent anesthetic complications and tolerated procedure well    Post vital signs: stable    Level of consciousness: awake and alert    Nausea/Vomiting: no nausea/vomiting    Complications: none    Transfer of care protocol was followed      Last vitals:   Visit Vitals  BP (!) 152/71 (BP Location: Left arm, Patient Position: Lying)   Pulse 83   Temp 36.1 °C (97 °F) (Temporal)   Resp 13   Ht 5' 4.96" (1.65 m)   Wt 68.5 kg (151 lb)   LMP  (LMP Unknown)   SpO2 99%   BMI 25.16 kg/m²     "

## 2023-01-25 NOTE — ASSESSMENT & PLAN NOTE
#Gram negative Bacteremia  -CT Abd shows possible hematoma but no signs fo acute infection or abscess   - Gram negative rods reported in Blood culture from 1/13  WBC improved to 43 form 45 1/22/23  Plan  - Continue Cefepime, flagyl for total of 14 day course End date 1/27/23  -  All further cultures on 1/17 w/ NGT  - afebrile  PT/OT  - Will need SNF and PT/OT outpatient  - Will need HD outpatient

## 2023-01-25 NOTE — PLAN OF CARE
Notified by iHD nurse about difficulty in performing today's iHD session, using newly placed RIJ TDC. Unable to get BFR >150 before the machine alarming. Nurse noted thick blood with appearance of separation. Only ran for 30min with ~100cc net UF.  Vitals stable, BP ~130/80, on RA. K+ 4.8, bicarb 16.     No indication for emergent iHD today, so will abort session and assess newly placed TDC line tomorrow or any other potential causes of inability to get BFR >150.     Gerardo Carrillo MD  [facility] Fellow  [facility], [facility]

## 2023-01-25 NOTE — OP NOTE
PATIENT: Lily Green    MRN: [id number]    DATE OF PROCEDURE:  01/25/2023    PREOPERATIVE DIAGNOSES:   Acute on chronic renal failure.    POSTOPERATIVE DIAGNOSES:  Acute on chronic renal failure.    PROCEDURE:  Removal of temporary dialysis catheter and Insertion of right internal jugular vein permanent dialysis   catheter using fluoroscopic guidance.    SURGEON:  Manuel Javier M.D.    ASSISTANT:  Sekou Villatoro M.D.    ANESTHESIA:  MAC.    PREP:  Chlorhexidine.    IMPLANT: 16F 24cm double lumen permanent dialysis catheter    SPECIMEN:  None.    ESTIMATED BLOOD LOSS:  Minimal.    INDICATIONS:  The patient is an 73 yo WF who was found to have   renal dysfunction with an imminent   need for hemodialysis.  The patient was agreeable to the initiation of hemodialysis.    The risks of the procedure were described to the patient including,but not limited to bleeding,   infection, pain, scarring, wound complications, potential injury to structures   in the neck or chest warranting more extensive surgery, line infection, and possible death.  The patient demonstrated understanding of these risks   and a consent form was obtained.    PROCEDURE IN DETAIL:  The patient was identified in the Preoperative Unit and   taken back to the Operating Room and laid supine on the operating room table.    IV antibiotics were administered prior to the administration of the anesthesia.    MAC anesthesia was administered without complication.  The patient was then   prepped and draped in a standard sterile fashion.  Timeout procedure was   performed in accordance with hospital protocol.  The internal jugular vein was accessed using the temporary catheter. The wire was passed without difficulty and the temporary catheter was removed.  Fluoroscopy was used to confirm the proper position of the wire.  An incision in the right upper chest was  made and the catheter was tunneled was from this incision towards the neck incision without  any issue.  Once the   PermCath cuff was in the appropriate position at the chest site, the peel-away sheath was then inserted over the wire using Seldinger  technique.  The wire and dilator were removed.  The catheter was then placed   into the peel-away sheath and peel-away sheath was removed while the catheter   was threaded forward.  Once this was complete, final fluoroscopic image did   confirm that the catheter was in appropriate position in the distal SVC / right   atrial junction.  Both ports of the catheter withdrew and flushed very easily.    Each port was then instilled with heparinized saline.  The neck incision was closed using vicryl suture in an interrupted fashion and dermabond.  The   chest incision was closed using vicryl suture in an interrupted fashion.    The catheter was fixed to the right chest wall using nylon suture.  Dry   sterile dressings were then applied.  The patient tolerated the procedure well   and there were no complications and was awakened from anesthesia and returned   to the Postoperative Recovery Unit in stable condition.  At the end of the case,  hemostasis was confirmed and sponge, instrument and needle counts were correct.  I was present and scrubbed throughout the entirety of the case.    COMPLICATIONS:  None.    CONDITION:  Stable.    Manuel Javier M.D., F.A.C.S.  Wknwsr-Clisdfnpw-Rsffheh and General Surgery  [facility]

## 2023-01-25 NOTE — PROGRESS NOTES
[facility]  General Surgery  Progress Note    Subjective:     History of Present Illness:  No notes on file    Post-Op Info:  Procedure(s) (LRB):  LAPAROTOMY, EXPLORATORY (N/A)  CLOSURE, COLOSTOMY (Left)  CREATION, COLOSTOMY (Right)   9 Days Post-Op     Interval History:   No acute issues overnight. Afebrile. VSS  Pain controlled well  no nausea / vomiting  (+) bowel function  To OR for tunneled line today      Medications:  Continuous Infusions:  Scheduled Meds:   sodium chloride 0.9%   Intravenous Once    sodium chloride 0.9%   Intravenous Once    ceFEPime (MAXIPIME) IVPB  2 g Intravenous Q24H    loperamide  2 mg Oral BID    metronidazole  500 mg Intravenous Q8H     PRN Meds:dextrose 10%, dextrose 10%, dextrose 10%, dicyclomine, glucagon (human recombinant), glucose, glucose, heparin (porcine), hydrALAZINE, HYDROcodone-acetaminophen, influenza 65up-adj, insulin aspart U-100, LIDOcaine, melatonin, ondansetron, promethazine, sodium chloride 0.9%, sodium chloride 0.9%, sodium chloride 0.9%     Review of patient's allergies indicates:   Allergen Reactions    Crestor [rosuvastatin] Swelling    Gluten protein      Objective:     Vital Signs (Most Recent):  Temp: 97.9 °F (36.6 °C) (01/25/23 0720)  Pulse: 86 (01/25/23 0720)  Resp: 16 (01/25/23 0720)  BP: (!) 151/72 (01/25/23 0720)  SpO2: 96 % (01/25/23 0720)   Vital Signs (24h Range):  Temp:  [96.9 °F (36.1 °C)-98.4 °F (36.9 °C)] 97.9 °F (36.6 °C)  Pulse:  [] 86  Resp:  [16-20] 16  SpO2:  [96 %-100 %] 96 %  BP: (143-166)/(72-77) 151/72     Weight: 68.5 kg (151 lb 0.2 oz)  Body mass index is 25.13 kg/m².    Intake/Output - Last 3 Shifts         01/23 0700 01/24 0659 01/24 0700 01/25 0659 01/25 0700 01/26 0659    P.O. 285      Other 500      IV Piggyback 555.2      Total Intake(mL/kg) 1340.2 (22)      Urine (mL/kg/hr) 0 (0) 0 (0)     Other 3000      Stool 450 100 300    Total Output 3450 100 300    Net -2109.8 -100 -300           Urine Occurrence 0 x 0 x      Stool Occurrence 0 x 0 x             Physical Exam  Constitutional:       General: She is not in acute distress.     Appearance: She is not ill-appearing or toxic-appearing.   Cardiovascular:      Rate and Rhythm: Normal rate and regular rhythm.   Pulmonary:      Effort: Pulmonary effort is normal. No respiratory distress.   Abdominal:      General: There is no distension.      Palpations: Abdomen is soft.      Tenderness: There is no abdominal tenderness. There is no guarding.      Hernia: No hernia is present.      Comments: Midline incision C/D/I with staples in place   LLQ incision C/D/I with staples in place   RLQ ostomy pink, patient and viable with air and stool in bag    Skin:     Capillary Refill: Capillary refill takes less than 2 seconds.   Neurological:      Mental Status: She is alert. Mental status is at baseline.       Significant Labs:  I have reviewed all pertinent lab results within the past 24 hours.  CBC:   Recent Labs   Lab 01/25/23  0453   WBC 23.48*   RBC 2.76*   HGB 7.7*   HCT 22.8*      MCV 83   MCH 27.9   MCHC 33.8     CMP:   Recent Labs   Lab 01/25/23  0453   *   CALCIUM 8.3*   ALBUMIN 1.8*   PROT 6.2   *   K 4.8   CO2 16*      BUN 62*   CREATININE 5.2*   ALKPHOS 96   *   AST 37   BILITOT 0.5       Significant Diagnostics:  I have reviewed all pertinent imaging results/findings within the past 24 hours.    Assessment/Plan:     Large bowel ischemia  73 yo female now s/p total abdominal colectomy with end ileostomy, takeback for bleeding on 1/16, now stable and off pressors, stepped down to the floor.    NPO currently - To OR today for tunneled line  Multimodal pain control  Continue BID Imodium for ostomy output, improved  Continue PT/OT  Encourage IS use   Rest of care per primary             Sekou Villatoro MD  General Surgery  [facility]      May not be able to place line due to OR limitations  Asked IR to see if they could place line  Keep pt  NPO    Manuel Javier M.D., F.A.C.S.  Ucirng-Efdtcqbvw-Tsrobfi and General Surgery  [facility]

## 2023-01-25 NOTE — ASSESSMENT & PLAN NOTE
Likely 2/2 to shock  BUN/Cr: 58/1.7 > 60/1.9  UOP: minimal despite aggressive IVF  - HD on today 1/19/23  -  in 24 hr  -Worsened BUN 62/4.1  -Phos 8.5  RIJ temp HD line on 1/14  Still anuric    Plan  -Strict I&Os,maintain euvolemia   -Avoid renal toxic meds   -Continue to monitor renal status and urine output   -Nephro consulted. Appreciate recs. Plan for continued HD   -Bladder scans QShift  -Consult Gen surgery for Tunnel catheter placement  - IR will assess to place line 1/25/23  - Will need continued HD outpatient

## 2023-01-25 NOTE — PLAN OF CARE
Problem: Physical Therapy  Goal: Physical Therapy Goal  Description: Goals to be met by: 2023     Patient will increase functional independence with mobility by performin. Supine to sit with Maximal Assistance-met   Revised: SBA  2. Rolling with Moderate Assistance.-met   Revised: SBA  3. Sitting at edge of bed x10 minutes with Minimal Assistance and Moderate Assistance-met   4. Increased functional strength to at least 3 for transfers and bed mobility-met 2023  Amend goals as appropriate  Amended goals 2023:  5. Patient will amb ~ 30ft with Marian with or without appropriate AD     Outcome: Ongoing, Progressing   Patient continues to progress toward goals; pt cont with flat affect and minimal verbalizations; encouraged to use her voice; pt did initiate conversation with letting needs be known; performed BLE/UE AROM ex while HOB elevated; pt rolled R with CGA, sup to sit with Marian and scooted to EOB with CGA; still appears confused - shrugs her shoulders when presented with a choice; sit to stand with CGA/Marian and amb using RW with CGA; step by step VCs given for technique with all mobility; CG and VCs to scoot back into chair; py left sitting in chair with sister present; cont with POC.

## 2023-01-25 NOTE — PROGRESS NOTES
[facility] Medicine  Progress Note    Patient Name: Lily Green  MRN: [id number]  Patient Class: IP- Inpatient   Admission Date: 1/13/2023  Length of Stay: 12 days  Attending Physician: Sylvia Dumont MD  Primary Care Provider: Pavel Calixto MD        Subjective:     Principal Problem:Peritonitis        HPI:  Patient is a 73 y/o female with PMHx of diabetes type 2 (treated with metformin), HTN, and celiac disease (following a strict diet) who presented to OSH after falling and injuring her face while getting out of bed. The patient reported experiencing nausea, vomiting, diarrhea, abdominal pain, and weakness starting the day before. She has had a weight loss of 60 pounds over an extended period of time, which may have been partially caused by her diet. The patient's review of systems was otherwise normal. Of note patient is currently taking Lyrica, which has recently been increased. On evaluation at bedside, patient feels good just with mild abdominal pain. No other symptoms. Denies LOC with falls, does not remember if she hit her head.     ED Course: Presented hypotensive and tachycardic. Labs showed elevated WBC of 26.16, Glucose 269, , LA 7, and Beta-hydroxybutyrate 0.6. She was acidotic with ph of 7.282 and AG 21. Rest of the labs unremarkable. CT ab/pel revealed distention of the colon with a large amount of stool and fluid, and a segment of the colon that appeared narrowed with possible thickening of the wall, strongly suspected to be a colonic mass. S/p enema and disimpaction. The patient was given fluids (3 L) and antibiotics and transferred for further evaluation and possible surgery      Overview/Hospital Course:  No notes on file    Interval History: NAEON    Review of Systems   Constitutional:  Positive for fatigue. Negative for fever.   HENT:  Negative for congestion and sore throat.    Eyes:  Negative for visual disturbance.   Respiratory:  Negative for apnea,  shortness of breath and wheezing.    Cardiovascular:  Negative for chest pain.   Gastrointestinal:  Positive for abdominal pain and diarrhea. Negative for nausea and vomiting.   Genitourinary:  Negative for dysuria.   Musculoskeletal:  Negative for arthralgias and back pain.   Neurological:  Negative for dizziness and headaches.        Burning neuropathy in palms and soles   Psychiatric/Behavioral: Negative.     Objective:     Vital Signs (Most Recent):  Temp: 97.9 °F (36.6 °C) (01/25/23 0720)  Pulse: 86 (01/25/23 0720)  Resp: 16 (01/25/23 0720)  BP: (!) 151/72 (01/25/23 0720)  SpO2: 96 % (01/25/23 0720)   Vital Signs (24h Range):  Temp:  [96.9 °F (36.1 °C)-98.4 °F (36.9 °C)] 97.9 °F (36.6 °C)  Pulse:  [] 86  Resp:  [16-20] 16  SpO2:  [96 %-100 %] 96 %  BP: (143-166)/(72-77) 151/72     Weight: 68.5 kg (151 lb 0.2 oz)  Body mass index is 25.13 kg/m².    Intake/Output Summary (Last 24 hours) at 1/25/2023 1003  Last data filed at 1/25/2023 0859  Gross per 24 hour   Intake --   Output 400 ml   Net -400 ml      Physical Exam  HENT:      Head: Normocephalic.      Comments: Contusion over left eye     Right Ear: External ear normal.      Left Ear: External ear normal.      Nose: Nose normal.      Mouth/Throat:      Mouth: Mucous membranes are moist.   Eyes:      Pupils: Pupils are equal, round, and reactive to light.   Cardiovascular:      Rate and Rhythm: Normal rate.      Pulses: Normal pulses.   Pulmonary:      Effort: Pulmonary effort is normal.   Abdominal:      General: Abdomen is flat.      Tenderness: There is abdominal tenderness.      Comments: Laparotomy and Ostomy sites clear clean and intact  Profuse liquid output per ileostomy    Skin:     General: Skin is warm.   Neurological:      Mental Status: She is alert.      Comments: Improved mentation and focus  Follows all commands  Improved recall  Slow speech   Psychiatric:      Comments: Some confusion        Significant Labs: All pertinent labs within the  past 24 hours have been reviewed.  BMP:   Recent Labs   Lab 01/25/23  0453   *   *   K 4.8      CO2 16*   BUN 62*   CREATININE 5.2*   CALCIUM 8.3*   MG 2.1     CBC:   Recent Labs   Lab 01/24/23  0530 01/25/23  0453   WBC 26.27* 23.48*   HGB 7.8* 7.7*   HCT 22.9* 22.8*    436     CMP:   Recent Labs   Lab 01/24/23  0530 01/25/23  0453   * 130*   K 4.4 4.8    102   CO2 21* 16*   * 119*   BUN 35* 62*   CREATININE 3.6* 5.2*   CALCIUM 8.1* 8.3*   PROT 5.9* 6.2   ALBUMIN 1.8* 1.8*   BILITOT 0.4 0.5   ALKPHOS 100 96   AST 47* 37   * 192*   ANIONGAP 8 12       Significant Imaging: I have reviewed all pertinent imaging results/findings within the past 24 hours.      Assessment/Plan:      * Peritonitis    Surgery s/p colon resection revealed no evidence of mass  S/P EX lap to stop abdomen artery bleed  No evidence of prior CV disease   Etiology unknown  Bladder pressure wnl  ABG ph 7.319 PCO2 30.7, O2 74 Bicarb 15.8  NG removed 1/20    Resolved  Plan:  Gen surg consulted. Appreciate recs  - Advance diet to mechanical soft gluten free  - Keep Mg 2, K 4  - Zofran prn for n/v       Primary hypertension  Chronic problem  Intially meds held in setting of chock  Plan  Will consider CCB initiation, amlodipine      Hyponatremia  Patient in renal failure 2/2 ATN  Receiving intermittent HD  Patient may have had excess PO fluids on 1/22/23  Improved to 129 post HD  Plan  Follow nephro Recs  Fluid restriction          Acute hypoxemic respiratory failure  Resolved    ATN (acute tubular necrosis)  Likely 2/2 to shock  BUN/Cr: 58/1.7 > 60/1.9  UOP: minimal despite aggressive IVF  - HD on today 1/19/23  -  in 24 hr  -Worsened BUN 62/4.1  -Phos 8.5  RIJ temp HD line on 1/14  Still anuric    Plan  -Strict I&Os,maintain euvolemia   -Avoid renal toxic meds   -Continue to monitor renal status and urine output   -Nephro consulted. Appreciate recs. Plan for continued HD   -Bladder scans  QShift  -Consult Gen surgery for Tunnel catheter placement  - IR will assess to place line 1/25/23  - Will need continued HD outpatient     Large bowel ischemia  #Gram negative Bacteremia  -CT Abd shows possible hematoma but no signs fo acute infection or abscess   - Gram negative rods reported in Blood culture from 1/13  WBC improved to 43 form 45 1/22/23  Plan  - Continue Cefepime, flagyl for total of 14 day course End date 1/27/23  -  All further cultures on 1/17 w/ NGT  - afebrile  PT/OT  - Will need SNF and PT/OT outpatient  - Will need HD outpatient    Type 2 diabetes mellitus with diabetic polyneuropathy, without long-term current use of insulin  SSI  Detemir 7U nightly  POCT BG QID with meals      VTE Risk Mitigation (From admission, onward)         Ordered     heparin (porcine) injection 2,400 Units  As needed (PRN)         01/17/23 1228     IP VTE HIGH RISK PATIENT  Once         01/14/23 0843     Place sequential compression device  Until discontinued         01/14/23 0843                Discharge Planning   CAMPBELL:      Code Status: Full Code   Is the patient medically ready for discharge?:     Reason for patient still in hospital (select all that apply): Patient trending condition, Treatment, Consult recommendations and Pending disposition  Discharge Plan A: Long-term acute care facility (LTAC)   Discharge Delays: (!) Post-Acute Set-up        Bal Burr MD  [facility] PGY-2  01/25/2023

## 2023-01-26 ENCOUNTER — PATIENT OUTREACH (OUTPATIENT)
Dept: ADMINISTRATIVE | Facility: OTHER | Age: 73
End: 2023-01-26
Payer: MEDICARE

## 2023-01-26 VITALS
OXYGEN SATURATION: 97 % | HEART RATE: 107 BPM | WEIGHT: 151 LBS | DIASTOLIC BLOOD PRESSURE: 62 MMHG | BODY MASS INDEX: 25.16 KG/M2 | RESPIRATION RATE: 18 BRPM | SYSTOLIC BLOOD PRESSURE: 118 MMHG | HEIGHT: 65 IN | TEMPERATURE: 98 F

## 2023-01-26 LAB
ALBUMIN SERPL BCP-MCNC: 1.8 G/DL (ref 3.5–5.2)
ALP SERPL-CCNC: 90 U/L (ref 55–135)
ALT SERPL W/O P-5'-P-CCNC: 152 U/L (ref 10–44)
ANION GAP SERPL CALC-SCNC: 15 MMOL/L (ref 8–16)
ANISOCYTOSIS BLD QL SMEAR: SLIGHT
AST SERPL-CCNC: 34 U/L (ref 10–40)
BASOPHILS # BLD AUTO: ABNORMAL K/UL (ref 0–0.2)
BASOPHILS NFR BLD: 0 % (ref 0–1.9)
BILIRUB SERPL-MCNC: 0.4 MG/DL (ref 0.1–1)
BUN SERPL-MCNC: 67 MG/DL (ref 8–23)
CALCIUM SERPL-MCNC: 8.4 MG/DL (ref 8.7–10.5)
CHLORIDE SERPL-SCNC: 102 MMOL/L (ref 95–110)
CO2 SERPL-SCNC: 16 MMOL/L (ref 23–29)
CREAT SERPL-MCNC: 5.6 MG/DL (ref 0.5–1.4)
DIFFERENTIAL METHOD: ABNORMAL
EOSINOPHIL # BLD AUTO: ABNORMAL K/UL (ref 0–0.5)
EOSINOPHIL NFR BLD: 1 % (ref 0–8)
ERYTHROCYTE [DISTWIDTH] IN BLOOD BY AUTOMATED COUNT: 19.4 % (ref 11.5–14.5)
EST. GFR  (NO RACE VARIABLE): 8 ML/MIN/1.73 M^2
GLUCOSE SERPL-MCNC: 97 MG/DL (ref 70–110)
HCT VFR BLD AUTO: 26.3 % (ref 37–48.5)
HGB BLD-MCNC: 8.5 G/DL (ref 12–16)
HYPOCHROMIA BLD QL SMEAR: ABNORMAL
IMM GRANULOCYTES # BLD AUTO: ABNORMAL K/UL (ref 0–0.04)
IMM GRANULOCYTES NFR BLD AUTO: ABNORMAL % (ref 0–0.5)
LYMPHOCYTES # BLD AUTO: ABNORMAL K/UL (ref 1–4.8)
LYMPHOCYTES NFR BLD: 11 % (ref 18–48)
MAGNESIUM SERPL-MCNC: 2.2 MG/DL (ref 1.6–2.6)
MCH RBC QN AUTO: 27.2 PG (ref 27–31)
MCHC RBC AUTO-ENTMCNC: 32.3 G/DL (ref 32–36)
MCV RBC AUTO: 84 FL (ref 82–98)
MONOCYTES # BLD AUTO: ABNORMAL K/UL (ref 0.3–1)
MONOCYTES NFR BLD: 5 % (ref 4–15)
MYELOCYTES NFR BLD MANUAL: 3 %
NEUTROPHILS NFR BLD: 77 % (ref 38–73)
NEUTS BAND NFR BLD MANUAL: 3 %
NRBC BLD-RTO: 0 /100 WBC
PHOSPHATE SERPL-MCNC: 5.3 MG/DL (ref 2.7–4.5)
PLATELET # BLD AUTO: 448 K/UL (ref 150–450)
PLATELET BLD QL SMEAR: ABNORMAL
PMV BLD AUTO: 10.3 FL (ref 9.2–12.9)
POCT GLUCOSE: 278 MG/DL (ref 70–110)
POCT GLUCOSE: 95 MG/DL (ref 70–110)
POIKILOCYTOSIS BLD QL SMEAR: SLIGHT
POTASSIUM SERPL-SCNC: 4.8 MMOL/L (ref 3.5–5.1)
PROT SERPL-MCNC: 6.4 G/DL (ref 6–8.4)
RBC # BLD AUTO: 3.12 M/UL (ref 4–5.4)
SODIUM SERPL-SCNC: 133 MMOL/L (ref 136–145)
TARGETS BLD QL SMEAR: ABNORMAL
WBC # BLD AUTO: 19.24 K/UL (ref 3.9–12.7)

## 2023-01-26 PROCEDURE — 90935 HEMODIALYSIS ONE EVALUATION: CPT

## 2023-01-26 PROCEDURE — 25000003 PHARM REV CODE 250: Performed by: SURGERY

## 2023-01-26 PROCEDURE — 97116 GAIT TRAINING THERAPY: CPT | Mod: CQ

## 2023-01-26 PROCEDURE — 85027 COMPLETE CBC AUTOMATED: CPT | Performed by: SURGERY

## 2023-01-26 PROCEDURE — 97110 THERAPEUTIC EXERCISES: CPT | Mod: CQ

## 2023-01-26 PROCEDURE — 63600175 PHARM REV CODE 636 W HCPCS: Performed by: SURGERY

## 2023-01-26 PROCEDURE — 63600175 PHARM REV CODE 636 W HCPCS: Mod: JG | Performed by: STUDENT IN AN ORGANIZED HEALTH CARE EDUCATION/TRAINING PROGRAM

## 2023-01-26 PROCEDURE — 83735 ASSAY OF MAGNESIUM: CPT | Performed by: SURGERY

## 2023-01-26 PROCEDURE — 80053 COMPREHEN METABOLIC PANEL: CPT | Performed by: SURGERY

## 2023-01-26 PROCEDURE — S0030 INJECTION, METRONIDAZOLE: HCPCS | Performed by: SURGERY

## 2023-01-26 PROCEDURE — 84100 ASSAY OF PHOSPHORUS: CPT | Performed by: SURGERY

## 2023-01-26 PROCEDURE — 85007 BL SMEAR W/DIFF WBC COUNT: CPT | Performed by: SURGERY

## 2023-01-26 PROCEDURE — 36415 COLL VENOUS BLD VENIPUNCTURE: CPT | Performed by: SURGERY

## 2023-01-26 RX ORDER — LIDOCAINE 50 MG/G
1 PATCH TOPICAL DAILY PRN
Status: CANCELLED | OUTPATIENT
Start: 2023-01-26

## 2023-01-26 RX ORDER — METRONIDAZOLE 500 MG/100ML
500 INJECTION, SOLUTION INTRAVENOUS
Status: CANCELLED | OUTPATIENT
Start: 2023-01-26 | End: 2023-01-27

## 2023-01-26 RX ORDER — SODIUM CHLORIDE 0.9 % (FLUSH) 0.9 %
5 SYRINGE (ML) INJECTION
Status: CANCELLED | OUTPATIENT
Start: 2023-01-26

## 2023-01-26 RX ORDER — IBUPROFEN 200 MG
16 TABLET ORAL
Status: CANCELLED | OUTPATIENT
Start: 2023-01-26

## 2023-01-26 RX ORDER — IBUPROFEN 200 MG
24 TABLET ORAL
Status: CANCELLED | OUTPATIENT
Start: 2023-01-26

## 2023-01-26 RX ORDER — PANTOPRAZOLE SODIUM 40 MG/1
40 TABLET, DELAYED RELEASE ORAL DAILY
Status: CANCELLED | OUTPATIENT
Start: 2023-01-27

## 2023-01-26 RX ORDER — GLUCAGON 1 MG
1 KIT INJECTION
Status: CANCELLED | OUTPATIENT
Start: 2023-01-26

## 2023-01-26 RX ORDER — HYDROCODONE BITARTRATE AND ACETAMINOPHEN 5; 325 MG/1; MG/1
1 TABLET ORAL EVERY 6 HOURS PRN
Status: CANCELLED | OUTPATIENT
Start: 2023-01-26

## 2023-01-26 RX ORDER — LOPERAMIDE HYDROCHLORIDE 2 MG/1
2 CAPSULE ORAL 2 TIMES DAILY
Status: CANCELLED | OUTPATIENT
Start: 2023-01-26

## 2023-01-26 RX ORDER — HEPARIN SODIUM 1000 [USP'U]/ML
2400 INJECTION, SOLUTION INTRAVENOUS; SUBCUTANEOUS
Status: CANCELLED | OUTPATIENT
Start: 2023-01-26

## 2023-01-26 RX ORDER — PROMETHAZINE HYDROCHLORIDE 6.25 MG/5ML
12.5 SYRUP ORAL EVERY 6 HOURS PRN
Status: CANCELLED | OUTPATIENT
Start: 2023-01-26

## 2023-01-26 RX ORDER — MECLIZINE HYDROCHLORIDE 25 MG/1
25 TABLET ORAL 3 TIMES DAILY PRN
Status: CANCELLED | OUTPATIENT
Start: 2023-01-26

## 2023-01-26 RX ORDER — AMLODIPINE BESYLATE 5 MG/1
5 TABLET ORAL DAILY
Status: CANCELLED | OUTPATIENT
Start: 2023-01-27

## 2023-01-26 RX ORDER — CYANOCOBALAMIN 1000 UG/ML
1000 INJECTION, SOLUTION INTRAMUSCULAR; SUBCUTANEOUS
Status: CANCELLED | OUTPATIENT
Start: 2023-01-27

## 2023-01-26 RX ORDER — TALC
9 POWDER (GRAM) TOPICAL NIGHTLY PRN
Status: CANCELLED | OUTPATIENT
Start: 2023-01-26

## 2023-01-26 RX ORDER — INSULIN ASPART 100 [IU]/ML
1-10 INJECTION, SOLUTION INTRAVENOUS; SUBCUTANEOUS
Status: CANCELLED | OUTPATIENT
Start: 2023-01-26

## 2023-01-26 RX ORDER — ONDANSETRON 2 MG/ML
4 INJECTION INTRAMUSCULAR; INTRAVENOUS EVERY 8 HOURS PRN
Status: CANCELLED | OUTPATIENT
Start: 2023-01-26

## 2023-01-26 RX ADMIN — HEPARIN SODIUM 2400 UNITS: 1000 INJECTION, SOLUTION INTRAVENOUS; SUBCUTANEOUS at 12:01

## 2023-01-26 RX ADMIN — ALTEPLASE 2 MG: 2.2 INJECTION, POWDER, LYOPHILIZED, FOR SOLUTION INTRAVENOUS at 12:01

## 2023-01-26 RX ADMIN — INSULIN ASPART 6 UNITS: 100 INJECTION, SOLUTION INTRAVENOUS; SUBCUTANEOUS at 05:01

## 2023-01-26 RX ADMIN — METRONIDAZOLE 500 MG: 5 INJECTION, SOLUTION INTRAVENOUS at 05:01

## 2023-01-26 RX ADMIN — METRONIDAZOLE 500 MG: 5 INJECTION, SOLUTION INTRAVENOUS at 12:01

## 2023-01-26 RX ADMIN — SODIUM CHLORIDE: 0.9 INJECTION, SOLUTION INTRAVENOUS at 11:01

## 2023-01-26 RX ADMIN — METRONIDAZOLE 500 MG: 5 INJECTION, SOLUTION INTRAVENOUS at 09:01

## 2023-01-26 RX ADMIN — CEFEPIME 2 G: 2 INJECTION, POWDER, FOR SOLUTION INTRAVENOUS at 02:01

## 2023-01-26 RX ADMIN — AMLODIPINE BESYLATE 5 MG: 5 TABLET ORAL at 09:01

## 2023-01-26 NOTE — ASSESSMENT & PLAN NOTE
Patient in renal failure 2/2 ATN  Receiving intermittent HD  Patient may have had excess PO fluids on 1/22/23  Improved to 129 post HD  Plan  Follow nephro Recs  Fluid restriction  Continue HD

## 2023-01-26 NOTE — PROGRESS NOTES
Patient at dialysis at time of visit, GIOVANNA met with patient spouse Deric to discuss discharge and additional patient barriers to care. Deric identified no additional social barriers to care. Per Deric, pt is not in need of resources at this time.    The following were addressed during this visit:  -Complete follow-up with patient    GIOVANNA Monroy

## 2023-01-26 NOTE — PROGRESS NOTES
[facility] Medicine  Progress Note    Patient Name: Lily Green  MRN: [id number]  Patient Class: IP- Inpatient   Admission Date: 1/13/2023  Length of Stay: 13 days  Attending Physician: Sylvia Dumont MD  Primary Care Provider: Pavel Calixto MD        Subjective:     Principal Problem:Peritonitis        HPI:  Patient is a 73 y/o female with PMHx of diabetes type 2 (treated with metformin), HTN, and celiac disease (following a strict diet) who presented to OSH after falling and injuring her face while getting out of bed. The patient reported experiencing nausea, vomiting, diarrhea, abdominal pain, and weakness starting the day before. She has had a weight loss of 60 pounds over an extended period of time, which may have been partially caused by her diet. The patient's review of systems was otherwise normal. Of note patient is currently taking Lyrica, which has recently been increased. On evaluation at bedside, patient feels good just with mild abdominal pain. No other symptoms. Denies LOC with falls, does not remember if she hit her head.     ED Course: Presented hypotensive and tachycardic. Labs showed elevated WBC of 26.16, Glucose 269, , LA 7, and Beta-hydroxybutyrate 0.6. She was acidotic with ph of 7.282 and AG 21. Rest of the labs unremarkable. CT ab/pel revealed distention of the colon with a large amount of stool and fluid, and a segment of the colon that appeared narrowed with possible thickening of the wall, strongly suspected to be a colonic mass. S/p enema and disimpaction. The patient was given fluids (3 L) and antibiotics and transferred for further evaluation and possible surgery      Overview/Hospital Course:  Patient with pmhx of HTN T2DM, Meneirs syndrome and Celiac disease presented to [facility] following a fall. Patient found tto be in minimal distress.  But was found to be hypotensive and tachycardic. Labs showed elevated WBC of 26.16, Glucose 269,  , LA 7, and Beta-hydroxybutyrate 0.6. She was acidotic with ph of 7.282 and AG 21. Rest of the labs unremarkable. CT ab/pel revealed distention of the colon with a large amount of stool and fluid, and a segment of the colon that appeared narrowed with possible thickening of the wall, strongly suspected to be a colonic mass. S/p enema and disimpaction. The patient was given fluids (3 L) and antibiotics and transferred for further evaluation and possible surgery. Transferred to [facility] for surgery.  Patient arrived to ICU with minimal symptoms. Overnight patient developed acute severe peritonities. Ex lap revealed severe ischemic bowel with necrosis. Patient underwent total colectomy. Returned to ICU for recovery. Intially stable and recovering well overnight. In AM CBC revealed 5 point drop in hemoglobin to 6.3. Bedside ultrasound showed fluid collection. CT abdomen confrimed brisk bleed. Patient returned to OR for ex lap. Bleed stopped in OR. Patient returned to ICU. Patient showed ATN on admit and required intiation of CRRT via trialysis line. Patient arrived anuric and remaiend so as of 1/25/23. Over course of 3 days and regular dialysis patient mentation and physical status improved. Patient was deemed stable and stepped down to floor on 1/21/23. Nephrology advises continued HD, general surgery consulted for placement of tunnel catheter.       Interval History: Patient tunnel catheter placed. During HD catheter became blocked. Patient only received 30 minutes of HD with 100ml fluid removal    Review of Systems   Constitutional:  Positive for fatigue. Negative for fever.   HENT:  Negative for congestion and sore throat.    Eyes:  Negative for visual disturbance.   Respiratory:  Negative for apnea, shortness of breath and wheezing.    Cardiovascular:  Negative for chest pain.   Gastrointestinal:  Positive for abdominal pain and diarrhea. Negative for nausea and vomiting.   Genitourinary:  Negative for  dysuria.   Musculoskeletal:  Negative for arthralgias and back pain.   Neurological:  Negative for dizziness and headaches.        Burning neuropathy in palms and soles   Psychiatric/Behavioral: Negative.     Objective:     Vital Signs (Most Recent):  Temp: 98 °F (36.7 °C) (01/26/23 0718)  Pulse: 89 (01/26/23 0718)  Resp: 18 (01/26/23 0718)  BP: (!) 154/72 (01/26/23 0718)  SpO2: 99 % (01/26/23 0718)   Vital Signs (24h Range):  Temp:  [97 °F (36.1 °C)-98.3 °F (36.8 °C)] 98 °F (36.7 °C)  Pulse:  [76-89] 89  Resp:  [13-27] 18  SpO2:  [97 %-100 %] 99 %  BP: (138-163)/(67-83) 154/72     Weight: 68.5 kg (151 lb)  Body mass index is 25.16 kg/m².    Intake/Output Summary (Last 24 hours) at 1/26/2023 1027  Last data filed at 1/26/2023 0800  Gross per 24 hour   Intake 590 ml   Output 1588 ml   Net -998 ml      Physical Exam  HENT:      Head: Normocephalic.      Comments: Contusion over left eye     Right Ear: External ear normal.      Left Ear: External ear normal.      Nose: Nose normal.      Mouth/Throat:      Mouth: Mucous membranes are moist.   Eyes:      Pupils: Pupils are equal, round, and reactive to light.   Cardiovascular:      Rate and Rhythm: Normal rate.      Pulses: Normal pulses.   Pulmonary:      Effort: Pulmonary effort is normal.   Abdominal:      General: Abdomen is flat.      Tenderness: There is abdominal tenderness.      Comments: Laparotomy and Ostomy sites clear clean and intact  Profuse liquid output per ileostomy    Skin:     General: Skin is warm.   Neurological:      Mental Status: She is alert.      Comments: Improved mentation and focus  Follows all commands  Improved recall  Slow speech   Psychiatric:      Comments: Some confusion        Significant Labs: All pertinent labs within the past 24 hours have been reviewed.  BMP:   Recent Labs   Lab 01/26/23  0636   GLU 97   *   K 4.8      CO2 16*   BUN 67*   CREATININE 5.6*   CALCIUM 8.4*   MG 2.2     CBC:   Recent Labs   Lab  01/25/23  0453 01/26/23  0636   WBC 23.48* 19.24*   HGB 7.7* 8.5*   HCT 22.8* 26.3*    448     CMP:   Recent Labs   Lab 01/25/23  0453 01/26/23  0636   * 133*   K 4.8 4.8    102   CO2 16* 16*   * 97   BUN 62* 67*   CREATININE 5.2* 5.6*   CALCIUM 8.3* 8.4*   PROT 6.2 6.4   ALBUMIN 1.8* 1.8*   BILITOT 0.5 0.4   ALKPHOS 96 90   AST 37 34   * 152*   ANIONGAP 12 15       Significant Imaging: I have reviewed all pertinent imaging results/findings within the past 24 hours.      Assessment/Plan:      * Peritonitis    Surgery s/p colon resection revealed no evidence of mass  S/P EX lap to stop abdomen artery bleed  No evidence of prior CV disease   Etiology unknown  Bladder pressure wnl  ABG ph 7.319 PCO2 30.7, O2 74 Bicarb 15.8  NG removed 1/20    Resolved  Plan:  Gen surg consulted. Appreciate recs  - Advance diet to mechanical soft gluten free  - Keep Mg 2, K 4  - Zofran prn for n/v       Primary hypertension  Chronic problem  Intially meds held in setting of chock  Plan  Will consider CCB initiation, amlodipine      Hyponatremia  Patient in renal failure 2/2 ATN  Receiving intermittent HD  Patient may have had excess PO fluids on 1/22/23  Improved to 129 post HD  Plan  Follow nephro Recs  Fluid restriction  Continue HD        Acute hypoxemic respiratory failure  Resolved    ATN (acute tubular necrosis)  Likely 2/2 to shock  BUN/Cr: 58/1.7 > 60/1.9  UOP: minimal despite aggressive IVF  - HD on today 1/19/23  -  in 24 hr  -Worsened BUN 62/4.1  -Phos 8.5  RIJ temp HD line on 1/14  Still anuric  Tunnel cath placed 1/25/23  Line became blocked at HD on 1.25.23    Plan  Reassess Catheter line in HD today  - Discuss with Surgery if line remains nonfunctional  -Strict I&Os,maintain euvolemia   -Avoid renal toxic meds   -Continue to monitor renal status and urine output   -Nephro consulted. Appreciate recs. Plan for continued HD   -Bladder scans QShift  - Will need continued HD outpatient      Large bowel ischemia  #Gram negative Bacteremia  -CT Abd shows possible hematoma but no signs fo acute infection or abscess   - Gram negative rods reported in Blood culture from 1/13  WBC improved to 43 form 45 1/22/23  Plan  - Continue Cefepime, flagyl for total of 14 day course End date 1/27/23  -  All further cultures on 1/17 w/ NGT  - afebrile  -PT/OT  - Will need SNF and PT/OT outpatient  - Will need HD outpatient    Idiopathic neuropathy  Holding home lyrica due to kidney function  Plan  Discuss alternative treatment options with Nephro  Patient states cold water foot paths can help      Type 2 diabetes mellitus with diabetic polyneuropathy, without long-term current use of insulin  SSI  Detemir 7U nightly  POCT BG QID with meals    VTE Risk Mitigation (From admission, onward)         Ordered     heparin (porcine) injection 2,400 Units  As needed (PRN)         01/17/23 1228     IP VTE HIGH RISK PATIENT  Once         01/14/23 0843     Place sequential compression device  Until discontinued         01/14/23 0843                Discharge Planning   CAMPBELL:      Code Status: Full Code   Is the patient medically ready for discharge?:     Reason for patient still in hospital (select all that apply): Patient trending condition, Treatment, Consult recommendations and Pending disposition  Discharge Plan A: Long-term acute care facility (LTAC)   Discharge Delays: (!) Post-Acute Set-up          Bal Burr MD  [facility] PGY-2  01/26/2023

## 2023-01-26 NOTE — PROGRESS NOTES
"[facility] Progress Note    Subjective:      Ms. Green is doing well today. Had trouble with dialysis yesterday after tunneled line placed, likely due to vasospasm after procedure. Will try again today and she is fine with this plan.     Sitting up and more interactive today. I helped her brush her teeth this morning and she was much more alert and conversant than in recent past.      Objective:   Last 24 Hour Vital Signs:  BP  Min: 138/83  Max: 163/73  Temp  Av.8 °F (36.6 °C)  Min: 97 °F (36.1 °C)  Max: 98.3 °F (36.8 °C)  Pulse  Av  Min: 76  Max: 89  Resp  Av.9  Min: 13  Max: 27  SpO2  Av.8 %  Min: 97 %  Max: 100 %  Height  Av' 4.96" (165 cm)  Min: 5' 4.96" (165 cm)  Max: 5' 4.96" (165 cm)  Weight  Av.5 kg (151 lb)  Min: 68.5 kg (151 lb)  Max: 68.5 kg (151 lb)  I/O last 3 completed shifts:  In: 470 [P.O.:120; Other:250; IV Piggyback:100]  Out: 1888 [Other:1488; Stool:400]    Physical Examination:  Gen: awake, alert, NAD  HEENT: normocephalic, EOMI, MMM, healing scabs on lips  CV: RRR, no murmurs appreciated  Lungs: CTAB  GI: Soft, non tender, ostomy present   Extrem: no edema, clubbing, or cyanosis  Skin: dry, warm, intact.   Neuro: talking, moving all extremities     Laboratory:  Recent Labs   Lab 23  0530 23  0453 23  0636   WBC 26.27* 23.48* 19.24*   HGB 7.8* 7.7* 8.5*   HCT 22.9* 22.8* 26.3*    436 448   * 130* 133*   K 4.4 4.8 4.8    102 102   CREATININE 3.6* 5.2* 5.6*   BUN 35* 62* 67*   CO2 21* 16* 16*   * 192* 152*   AST 47* 37 34         Radiology:  Imaging has been reviewed personally.       Assessment and Plan:     Lily Green is a 72 y.o. woman with a history of celiac disease, DM2 well controlled, HTN, anxiety presented after having a fall. Noted to have severe lactic acidosis with imaging concerning for ischemic colitis. Became hypotensive and started on levophed and vasopressin for shock. On arrival to [place] " taken to OR for colectomy on 1/14. UOP severely diminished and essentially became anuric with only 50ml UOP. [facility] consulted for severe metabolic acidosis with pH 7.14 and HINA in setting of septic shock and ischemic colitis. HD initiated 1/14 and had one session with improvement in acidosis. Developed hemorrhagic shock with bleeding into the abdomen 1/16 and taken back to OR for exlap and ileostomy creation. CRRT initiated 1/16, now off pressors and back on iHD.      Stage III Anuric HINA  - baseline creatinine 0.8  - presenting in shock on pressors with ischemic colitis s/p colectomy 1/14 and another ischemic event 1/16 with hemorrhagic shock.  - ATN with 2 separate insults will likely take several weeks to months to recover  - HD initiated 1/14  - remains anuric  - continue HD on MWF schedule  - tunneled line placed yesterday and not functioning well after, likely vasospasm. Will try again today   - renally dose all medications      Anion Gap Metabolic Acidosis  - bicarb 16 today and expect to improve with dialysis      Hyponatremia  -  most consistent with hypervolemia, Na 133 today and will have HD today      Hyperphosphatemia  - phos initially high but now on regular HD and lower. Phos now stable. No need for binder at this time.      Hypocalcemia  - Calcium corrects for low albumin       Thank you for allowing us to participate in the care of this patient. Please call with any questions.       Elizabeth Mobley, DO  [facility]

## 2023-01-26 NOTE — PLAN OF CARE
GUEVARA met with pt and pt's  [phone] at bedside to complete final assessment Pt will d/c today to [facility] to room 228. GUEVARA set wc van transport for 7pm. Sw requested f/u appts. Rounds completed on pt.  All questions addressed.  Bedside nurse to discuss d/c medications.  Discussed importance to attend all f/u appts and take medications as prescribed.  Verbalized understanding.    Barrie Spencer, MSW  [phone]    Future Appointments   Date Time Provider Department Center   2/13/2023  2:20 PM Ej Mitchell MD [facility] [id number]   2/20/2023  2:15 PM Raz Serrano MD [facility]   4/4/2023  2:20 PM Lucas Bryan DPM [facility] POD [place]   5/4/2023 11:30 AM Pavel Calixto MD [facility] [place]        01/26/23 1642   Final Note   Assessment Type Final Discharge Note   Anticipated Discharge Disposition LTAC   What phone number can be called within the next 1-3 days to see how you are doing after discharge? [phone]   Hospital Resources/Appts/Education Provided Appointments scheduled and added to AVS   Post-Acute Status   Post-Acute Authorization Placement   Post-Acute Placement Status Set-up Complete/Auth obtained   Coverage [employer]   Discharge Delays None known at this time

## 2023-01-26 NOTE — ASSESSMENT & PLAN NOTE
Likely 2/2 to shock  BUN/Cr: 58/1.7 > 60/1.9  UOP: minimal despite aggressive IVF  - HD on today 1/19/23  -  in 24 hr  -Worsened BUN 62/4.1  -Phos 8.5  RIJ temp HD line on 1/14  Still anuric  Tunnel cath placed 1/25/23  Line became blocked at HD on 1.25.23    Plan  Reassess Catheter line in HD today  - Discuss with Surgery if line remains nonfunctional  -Strict I&Os,maintain euvolemia   -Avoid renal toxic meds   -Continue to monitor renal status and urine output   -Nephro consulted. Appreciate recs. Plan for continued HD   -Bladder scans QShift  - Will need continued HD outpatient

## 2023-01-26 NOTE — PT/OT/SLP PROGRESS
Physical Therapy Treatment    Patient Name:  Lily Green   MRN:  [id number]    Recommendations:     Discharge Recommendations:  (post acute placement(LTACH per chart))  Discharge Equipment Recommendations:  (TBD)  Barriers to discharge:  decreased mobility,strength and endurance    Assessment:     Lily Green is a 72 y.o. female admitted with a medical diagnosis of Peritonitis.  She presents with the following impairments/functional limitations: weakness, impaired endurance, impaired functional mobility, gait instability, impaired balance, decreased upper extremity function, decreased lower extremity function, decreased safety awareness, decreased ROM, impaired coordination, impaired skin,pt with good participation and requires assistance with all mobility at this time,pt will benefit from continuing PT services upon discharge.    Rehab Prognosis: Fair; patient would benefit from acute skilled PT services to address these deficits and reach maximum level of function.    Recent Surgery: Procedure(s) (LRB):  INSERTION, CATHETER, HEMODIALYSIS, DUAL LUMEN (Right)  REMOVAL, TUNNELED CATH (Right) 1 Day Post-Op    Plan:     During this hospitalization, patient to be seen 5 x/week to address the identified rehab impairments via gait training, therapeutic activities, therapeutic exercises, neuromuscular re-education and progress toward the following goals:    Plan of Care Expires:  02/18/23    Subjective     Chief Complaint: n/a  Patient/Family Comments/goals: pt agreeable to rx.  Pain/Comfort:  Pain Rating 1:  (no c/o's)      Objective:     Communicated with nsg prior to session.  Patient found supine with bed alarm, peripheral IV upon PT entry to room.     General Precautions: Standard, fall  Orthopedic Precautions: N/A  Braces: N/A  Respiratory Status: Room air     Functional Mobility:  Bed Mobility:     Supine to Sit: minimum assistance and moderate assistance  Transfers:     Sit to Stand:  minimum  assistance and X 2 trials with rolling walker  Bed to Chair: minimum assistance with  rolling walker  using  ambulation  Gait: amb ~28' and ~16' with RW and Min A with IV in tow,pt with some anxiety noted  Balance: fair standing balance with RW      AM-PAC 6 CLICK MOBILITY  Turning over in bed (including adjusting bedclothes, sheets and blankets)?: 3  Sitting down on and standing up from a chair with arms (e.g., wheelchair, bedside commode, etc.): 3  Moving from lying on back to sitting on the side of the bed?: 3  Moving to and from a bed to a chair (including a wheelchair)?: 3  Need to walk in hospital room?: 3  Climbing 3-5 steps with a railing?: 1  Basic Mobility Total Score: 16       Treatment & Education: le seated ex's X 10-12 reps inc: ap,laq's and hip flex,also cervical ROM X 5-7 reps      Patient left up in chair with all lines intact, call button in reach, chair alarm on, and family present..    GOALS: see general POC  Multidisciplinary Problems       Physical Therapy Goals          Problem: Physical Therapy    Goal Priority Disciplines Outcome Goal Variances Interventions   Physical Therapy Goal     PT, PT/OT Ongoing, Progressing     Description: Goals to be met by: 2023     Patient will increase functional independence with mobility by performin. Supine to sit with Maximal Assistance-met   Revised: SBA  2. Rolling with Moderate Assistance.-met   Revised: SBA  3. Sitting at edge of bed x10 minutes with Minimal Assistance and Moderate Assistance-met   4. Increased functional strength to at least 3 for transfers and bed mobility-met 2023  Amend goals as appropriate  Amended goals 2023:  5. Patient will amb ~ 30ft with Marian with or without appropriate AD                          Time Tracking:     PT Received On: 23  PT Start Time: 1341     PT Stop Time: 1406  PT Total Time (min): 25 min     Billable Minutes: Gait Training 15 and Therapeutic Exercise 10    Treatment  Type: Treatment  PT/PTA: PTA     PTA Visit Number: 1     01/26/2023

## 2023-01-26 NOTE — PLAN OF CARE
Problem: Physical Therapy  Goal: Physical Therapy Goal  Description: Goals to be met by: 2023     Patient will increase functional independence with mobility by performin. Supine to sit with Maximal Assistance-met   Revised: SBA  2. Rolling with Moderate Assistance.-met   Revised: SBA  3. Sitting at edge of bed x10 minutes with Minimal Assistance and Moderate Assistance-met   4. Increased functional strength to at least 3 for transfers and bed mobility-met 2023  Amend goals as appropriate  Amended goals 2023:  5. Patient will amb ~ 30ft with Marian with or without appropriate AD     Outcome: Ongoing, Progressing      Physical Therapy     Referred by: Dillan Esposito MD; Medical Diagnosis (from order):    Diagnosis Information      Diagnosis    724.3 (ICD-9-CM) - M54.42 (ICD-10-CM) - Acute back pain with sciatica, left                Daily Treatment Note    Visit:  4     SUBJECTIVE                                                                                                               Has realized it is less of his back pain and more of his leg pain. Both legs. Legs start to hurt when he squats down all the way. Gets pain in his lower leg and then it goes numb. Thinks it is from the socks he wears. Notices it when he wears an ankle sock, but when he wearing a higher sock he does not notice it. Pain is in the back of both legs. Does not feel it when he is standing or walking anymore. Wonders if it could be that his legs got weak from not doing much. Gym work outs are going well. Doing more weight controlled machines versus free weights. Is going to add more core strengthening into his workouts. Wants to play basketball this weekend. Current pain denied.   Functional Change: No longer getting pain standing or walking     OBJECTIVE                                                                                                                        TREATMENT                                                                                                                  Therapeutic Exercise:  Bike for 10 minutes     Bridging with legs on ball 10x10 seconds (added hamstring curls x10)  Bird dog crunch x10 bilateral   Bird dog on ball x10 bilateral   paloff press with 7.5 pounds 10x5 seconds bilateral   Chop with 17.5 pounds x10 bilateral   Pull down with arms straight with straight bar with 17.5 pounds x10   Plank 10x10 seconds   Side plank 5x10 seconds each   Half kneeling in tandem with rotation x10 bilateral   Dead lift with 15 pounds x10     Skilled input: verbal instruction/cues and posture correction    Writer verbally  educated and received verbal consent for hand placement, positioning of patient, and techniques to be performed today from patient for therapist position for techniques as described above and how they are pertinent to the patient's plan of care.    Home Exercise Program/Education Materials: *above indicates provided as part of home exercise program    Double knee to chest*  Hook lying lower trunk rotation*  Sciatic nerve glide*     ASSESSMENT                                                                                                             Warm up on bike for mobility and endurance. Continued with progression of exercises for core strengthening. Added new exercises for hamstring strengthening. Incorporated use of ball to challenge stability. Demonstrating good familiarity with previous exercises. Tolerated session well. Fatigue post-session. Progressed home exercise program.   Pain/symptoms after session (out of 10): 0    Patient Education:   Results of above outlined education: Verbalizes understanding and Demonstrates understanding      PLAN                                                                                                                           Suggestions for next session as indicated: Progress per plan of care         Therapy procedure time and total treatment time can be found documented on the Time Entry flowsheet

## 2023-01-26 NOTE — DISCHARGE SUMMARY
[facility] Medicine  Discharge Summary      Patient Name: Lily Green  MRN: [id number]  ANDER: [id number]  Patient Class: IP- Inpatient  Admission Date: 1/13/2023  Hospital Length of Stay: 13 days  Discharge Date and Time:  01/26/2023 5:40 PM  Attending Physician: Sylvia Dumont MD   Discharging Provider: Bal Burr MD  Primary Care Provider: Pavel Calixto MD    Primary Care Team: Networked reference to record PCT     HPI:   Patient is a 71 y/o female with PMHx of diabetes type 2 (treated with metformin), HTN, and celiac disease (following a strict diet) who presented to OSH after falling and injuring her face while getting out of bed. The patient reported experiencing nausea, vomiting, diarrhea, abdominal pain, and weakness starting the day before. She has had a weight loss of 60 pounds over an extended period of time, which may have been partially caused by her diet. The patient's review of systems was otherwise normal. Of note patient is currently taking Lyrica, which has recently been increased. On evaluation at bedside, patient feels good just with mild abdominal pain. No other symptoms. Denies LOC with falls, does not remember if she hit her head.     ED Course: Presented hypotensive and tachycardic. Labs showed elevated WBC of 26.16, Glucose 269, , LA 7, and Beta-hydroxybutyrate 0.6. She was acidotic with ph of 7.282 and AG 21. Rest of the labs unremarkable. CT ab/pel revealed distention of the colon with a large amount of stool and fluid, and a segment of the colon that appeared narrowed with possible thickening of the wall, strongly suspected to be a colonic mass. S/p enema and disimpaction. The patient was given fluids (3 L) and antibiotics and transferred for further evaluation and possible surgery      Procedure(s) (LRB):  INSERTION, CATHETER, HEMODIALYSIS, DUAL LUMEN (Right)  REMOVAL, TUNNELED CATH (Right)      Hospital Course:   Patient with pmhx of HTN T2DM,  Meneirs syndrome and Celiac disease presented to [facility] following a fall. Patient found tto be in minimal distress.  But was found to be hypotensive and tachycardic. Labs showed elevated WBC of 26.16, Glucose 269, , LA 7, and Beta-hydroxybutyrate 0.6. She was acidotic with ph of 7.282 and AG 21. Rest of the labs unremarkable. CT ab/pel revealed distention of the colon with a large amount of stool and fluid, and a segment of the colon that appeared narrowed with possible thickening of the wall, strongly suspected to be a colonic mass. S/p enema and disimpaction. The patient was given fluids (3 L) and antibiotics and transferred for further evaluation and possible surgery. Transferred to [facility] for surgery.  Patient arrived to ICU with minimal symptoms. Overnight patient developed acute severe peritonities. Ex lap revealed severe ischemic bowel with necrosis. Patient underwent total colectomy. Returned to ICU for recovery. Intially stable and recovering well overnight. In AM CBC revealed 5 point drop in hemoglobin to 6.3. Bedside ultrasound showed fluid collection. CT abdomen confrimed brisk bleed. Patient returned to OR for ex lap. Bleed stopped in OR. Patient returned to ICU. Patient showed ATN on admit and required intiation of CRRT via trialysis line. Patient arrived anuric and remaiend so as of 1/25/23. Over course of 3 days and regular dialysis patient mentation and physical status improved. Patient was deemed stable and stepped down to floor on 1/21/23. Nephrology advises continued HD. Tunnel catheter placed by general surgery. HD stopped early on day of line placement due to blockage. This is suspected to be from vasospasm. HD occurred successfully on 1/26/23. Nephrology okay to follow-up with patient during stay at [facility]. Patient to continue MWF dialysis. Patient to continue antibiotics for 4 more days to complete her 14 day course. Patient transported to [facility] for PT/OT and  antibiotic treatment and continued hemodialysis.        Goals of Care Treatment Preferences:  Code Status: Full Code      Consults:   Consults (From admission, onward)        Status Ordering Provider     Inpatient consult to General Surgery  Once        Provider:  (Not yet assigned)    Acknowledged KVNG JAVIER     Inpatient consult to Registered Dietitian/Nutritionist  Once        Provider:  (Not yet assigned)    Completed GREGOR PAYAN     Inpatient consult to Registered Dietitian/Nutritionist  Once        Provider:  (Not yet assigned)    Completed ELMER RANGEL     Inpatient consult to Nephrology-[facility]  Once        Provider:  (Not yet assigned)    Completed ELMER RANGEL     Inpatient consult to General Surgery  Once        Provider:  Kvng Javier MD    Completed KVNG JAVIER     Inpatient consult to Pulmonology  Once        Provider:  (Not yet assigned)    Completed MAYRA PÉREZ          No new Assessment & Plan notes have been filed under this hospital service since the last note was generated.  Service: Hospital Medicine    Final Active Diagnoses:    Diagnosis Date Noted POA    PRINCIPAL PROBLEM:  Peritonitis [K65.9] 01/14/2023 Yes    Primary hypertension [I10] 01/24/2023 Yes    Hyponatremia [E87.1] 01/22/2023 No    Elevated lactic acid level [R79.89] 01/19/2023 Yes    Shock [R57.9] 01/15/2023 No    Large bowel ischemia [K55.9] 01/15/2023 Yes    ATN (acute tubular necrosis) [N17.0] 01/15/2023 No    HINA (acute kidney injury) [N17.9] 01/15/2023 Yes    Acute hypoxemic respiratory failure [J96.01] 01/15/2023 No    Idiopathic neuropathy [G60.9] 11/19/2021 Yes    Type 2 diabetes mellitus with diabetic polyneuropathy, without long-term current use of insulin [E11.42] 10/17/2019 Yes      Problems Resolved During this Admission:       Discharged Condition: stable    Disposition: [facility] for continued rehabilitation and treatment    Follow Up:    Patient Instructions:   No  discharge procedures on file.    Significant Diagnostic Studies: Labs:   BMP:   Recent Labs   Lab 01/25/23  0453 01/26/23  0636   * 97   * 133*   K 4.8 4.8    102   CO2 16* 16*   BUN 62* 67*   CREATININE 5.2* 5.6*   CALCIUM 8.3* 8.4*   MG 2.1 2.2   , CMP   Recent Labs   Lab 01/25/23  0453 01/26/23  0636   * 133*   K 4.8 4.8    102   CO2 16* 16*   * 97   BUN 62* 67*   CREATININE 5.2* 5.6*   CALCIUM 8.3* 8.4*   PROT 6.2 6.4   ALBUMIN 1.8* 1.8*   BILITOT 0.5 0.4   ALKPHOS 96 90   AST 37 34   * 152*   ANIONGAP 12 15    and CBC   Recent Labs   Lab 01/25/23  0453 01/26/23  0636   WBC 23.48* 19.24*   HGB 7.7* 8.5*   HCT 22.8* 26.3*    448       Pending Diagnostic Studies:     Procedure Component Value Units Date/Time    Echo [[id number]]     Order Status: Sent Lab Status: No result     Haptoglobin [[id number]] Collected: 01/16/23 1009    Order Status: Sent Lab Status: In process Updated: 01/16/23 1010    Specimen: Blood, Arterial     Specimen to Pathology, Surgery General Surgery [[id number]] Collected: 01/14/23 0645    Order Status: Sent Lab Status: In process Updated: 01/14/23 0645    Specimen: Tissue          Medications:  Reconciled Home Medications:      Medication List      ASK your doctor about these medications    alpha lipoic acid 600 mg Cap     atorvastatin 40 MG tablet  Commonly known as: LIPITOR  TAKE 1 TABLET BY MOUTH ONCE DAILY     BETAHISTINE (BULK) MISC     blood-glucose meter kit  Use to test twice a day     CALTRATE WITH VITAMIN D3 ORAL  Take by mouth.     CINNAMON ORAL  Take by mouth.     coenzyme Q10 100 mg capsule     cyanocobalamin 1,000 mcg/mL injection  Inject 1 mL (1,000 mcg total) into the muscle every 30 days.     dicyclomine 10 MG capsule  Commonly known as: BENTYL     DULoxetine 30 MG capsule  Commonly known as: CYMBALTA  Take 1 capsule (30 mg total) by mouth 2 (two) times daily.     econazole nitrate 1 % cream  Apply topically 2 (two) times  daily.     finasteride 5 mg tablet  Commonly known as: PROSCAR     flaxseed oiL 1,000 mg Cap  once a day     gluc-condr-om3-dha-epa-fish-st 580-975-01-54 mg Cap  once a day     glucosamine HCl 1,500 mg Tab  Take 1,500 mg by mouth.     KRILL OIL ORAL  Take by mouth.     lancets Misc  1 lancet by Misc.(Non-Drug; Combo Route) route once daily.     meclizine 25 mg tablet  Commonly known as: ANTIVERT  Take 1 tablet (25 mg total) by mouth 3 (three) times daily as needed for Dizziness.     metFORMIN 500 MG tablet  Commonly known as: GLUCOPHAGE  Take 1 tablet (500 mg total) by mouth 3 (three) times daily. 2 po qam, 1 po qhs.     MILK THISTLE ORAL  Take by mouth.     pantoprazole 40 MG tablet  Commonly known as: PROTONIX  TAKE 1 TABLET BY MOUTH ONCE DAILY     pregabalin 150 MG capsule  Commonly known as: LYRICA  Take 1 capsule (150 mg total) by mouth 2 (two) times daily.     temazepam 30 mg capsule  Commonly known as: RESTORIL  TAKE ONE CAPSULE BY MOUTH NIGHTLY AS NEEDED FOR INSOMNIA     triamcinolone acetonide 0.1% 0.1 % ointment  Commonly known as: KENALOG  Apply topically 2 (two) times daily as needed (rash).     triamterene-hydrochlorothiazide 37.5-25 mg 37.5-25 mg per tablet  Commonly known as: MAXZIDE-25     TRUE METRIX GLUCOSE TEST STRIP Strp  Generic drug: blood sugar diagnostic  USE ONE STRIP FOR TESTING 2 TIMES A DAY  Ask about: Which instructions should I use?     vit C,C-Zx-vtirj-lutein-zeaxan 250-90-40-1 mg Cap  Commonly known as: PRESERVISION AREDS 2     vitamins A,C,E-zinc-copper 14,320-226-200 unit-mg-unit Cap  Take 1 tablet by mouth once daily.     ZYRTEC ORAL            Indwelling Lines/Drains at time of discharge:   Lines/Drains/Airways     Central Venous Catheter Line  Duration                Hemodialysis Catheter 01/25/23 1501 right internal jugular 1 day          Drain  Duration                Ileostomy 01/16/23 1230 RLQ 10 days                Time spent on the discharge of patient: 30 minutes          Bal Burr MD  Department of Hospital Medicine  [facility]

## 2023-01-26 NOTE — SUBJECTIVE & OBJECTIVE
Interval History: Patient tunnel catheter placed. During HD catheter became blocked. Patient only received 30 minutes of HD with 100ml fluid removal    Review of Systems   Constitutional:  Positive for fatigue. Negative for fever.   HENT:  Negative for congestion and sore throat.    Eyes:  Negative for visual disturbance.   Respiratory:  Negative for apnea, shortness of breath and wheezing.    Cardiovascular:  Negative for chest pain.   Gastrointestinal:  Positive for abdominal pain and diarrhea. Negative for nausea and vomiting.   Genitourinary:  Negative for dysuria.   Musculoskeletal:  Negative for arthralgias and back pain.   Neurological:  Negative for dizziness and headaches.        Burning neuropathy in palms and soles   Psychiatric/Behavioral: Negative.     Objective:     Vital Signs (Most Recent):  Temp: 98 °F (36.7 °C) (01/26/23 0718)  Pulse: 89 (01/26/23 0718)  Resp: 18 (01/26/23 0718)  BP: (!) 154/72 (01/26/23 0718)  SpO2: 99 % (01/26/23 0718)   Vital Signs (24h Range):  Temp:  [97 °F (36.1 °C)-98.3 °F (36.8 °C)] 98 °F (36.7 °C)  Pulse:  [76-89] 89  Resp:  [13-27] 18  SpO2:  [97 %-100 %] 99 %  BP: (138-163)/(67-83) 154/72     Weight: 68.5 kg (151 lb)  Body mass index is 25.16 kg/m².    Intake/Output Summary (Last 24 hours) at 1/26/2023 1027  Last data filed at 1/26/2023 0800  Gross per 24 hour   Intake 590 ml   Output 1588 ml   Net -998 ml      Physical Exam  HENT:      Head: Normocephalic.      Comments: Contusion over left eye     Right Ear: External ear normal.      Left Ear: External ear normal.      Nose: Nose normal.      Mouth/Throat:      Mouth: Mucous membranes are moist.   Eyes:      Pupils: Pupils are equal, round, and reactive to light.   Cardiovascular:      Rate and Rhythm: Normal rate.      Pulses: Normal pulses.   Pulmonary:      Effort: Pulmonary effort is normal.   Abdominal:      General: Abdomen is flat.      Tenderness: There is abdominal tenderness.      Comments: Laparotomy and  Ostomy sites clear clean and intact  Profuse liquid output per ileostomy    Skin:     General: Skin is warm.   Neurological:      Mental Status: She is alert.      Comments: Improved mentation and focus  Follows all commands  Improved recall  Slow speech   Psychiatric:      Comments: Some confusion        Significant Labs: All pertinent labs within the past 24 hours have been reviewed.  BMP:   Recent Labs   Lab 01/26/23  0636   GLU 97   *   K 4.8      CO2 16*   BUN 67*   CREATININE 5.6*   CALCIUM 8.4*   MG 2.2     CBC:   Recent Labs   Lab 01/25/23  0453 01/26/23  0636   WBC 23.48* 19.24*   HGB 7.7* 8.5*   HCT 22.8* 26.3*    448     CMP:   Recent Labs   Lab 01/25/23 0453 01/26/23  0636   * 133*   K 4.8 4.8    102   CO2 16* 16*   * 97   BUN 62* 67*   CREATININE 5.2* 5.6*   CALCIUM 8.3* 8.4*   PROT 6.2 6.4   ALBUMIN 1.8* 1.8*   BILITOT 0.5 0.4   ALKPHOS 96 90   AST 37 34   * 152*   ANIONGAP 12 15       Significant Imaging: I have reviewed all pertinent imaging results/findings within the past 24 hours.

## 2023-01-26 NOTE — PROGRESS NOTES
01/25/23 1754   Vital Signs   Resp 20   /83   Post-Hemodialysis Assessment   Rinseback Volume (mL) 250 mL   Blood Volume Processed (Liters) 14.3 L   Dialyzer Clearance Moderately streaked   Duration of Treatment 75 minutes   Additional Fluid Intake (mL) 250 mL   Total UF (mL) 1488 mL   Net Fluid Removal 988   Patient Response to Treatment unable to maintain adequate blood flow rate for clearance. due to running 150 BFR system clotted. Nephrology aware.   Post-Hemodialysis Comments Blood returned. Lines disconnected. cvc locked per P&p. post HD report given to nurse.

## 2023-01-26 NOTE — PLAN OF CARE
VN note: Patient chart, labs, and vitals reviewed. VN to continue to be available as needed. Plan to d/c to LTAC soon.

## 2023-01-26 NOTE — PROGRESS NOTES
01/25/23 1754   Vital Signs   Resp 20   /83        Hemodialysis Catheter 01/25/23 1501 right internal jugular   Placement Date/Time: 01/25/23 1501   Present Prior to Hospital Arrival?: No  Hand Hygiene: Performed  Barrier Precautions: Performed  Skin Antisepsis: ChloraPrep  Hemodialysis Catheter Type: Tunneled catheter  Location: right internal jugular  Cathete...   Line Necessity Review CRRT/HD   Site Assessment No drainage;No redness;No swelling;No warmth   Line Securement Device Secured with sutures   Dressing Type Biopatch in place;Central line dressing   Dressing Status Clean;Dry;Intact   Dressing Intervention First dressing   Venous Patency/Care flushed w/o difficulty;blood return present   Arterial Patency/Care flushed w/o difficulty;blood return not present   Post-Hemodialysis Assessment   Rinseback Volume (mL) 250 mL   Blood Volume Processed (Liters) 14.3 L   Dialyzer Clearance Moderately streaked   Duration of Treatment 75 minutes   Additional Fluid Intake (mL) 250 mL   Total UF (mL) 1488 mL   Net Fluid Removal 988   Patient Response to Treatment unable to maintain adequate blood flow rate for clearance. due to running 150 BFR system clotted. Nephrology aware.   Post-Hemodialysis Comments Blood returned. Lines disconnected. cvc locked per P&p. post HD report given to nurse.

## 2023-01-26 NOTE — ASSESSMENT & PLAN NOTE
#Gram negative Bacteremia  -CT Abd shows possible hematoma but no signs fo acute infection or abscess   - Gram negative rods reported in Blood culture from 1/13  WBC improved to 43 form 45 1/22/23  Plan  - Continue Cefepime, flagyl for total of 14 day course End date 1/27/23  -  All further cultures on 1/17 w/ NGT  - afebrile  -PT/OT  - Will need SNF and PT/OT outpatient  - Will need HD outpatient

## 2023-01-26 NOTE — PLAN OF CARE
Disoriented to time and situation. C/o neuropathy pain, MD aware. R SC tunneled cath placed today. Pt went to dialysis. Tolerating renal diet. IV ABX given per MAR. Midline incision, staples intact. Blood glucose monitoring maintained. Bed locked in lowest position, bed alarm set and call bell within reach.

## 2023-01-26 NOTE — PROGRESS NOTES
01/26/23 1245   Vital Signs   Temp 97.8 °F (36.6 °C)   Temp src Temporal   Pulse 92   Heart Rate Source Right;Brachial;NIBP   Resp 18   Device (Oxygen Therapy) room air   /70   BP Location Right arm   BP Method Automatic   Patient Position Lying   Post-Hemodialysis Assessment   Rinseback Volume (mL) 250 mL   Blood Volume Processed (Liters) 36.4 L   Dialyzer Clearance Lightly streaked   Duration of Treatment 180 minutes   Additional Fluid Intake (mL) 500 mL   Total UF (mL) 2100 mL   Net Fluid Removal 1600   Patient Response to Treatment well   Post-Hemodialysis Comments pt awake and alert. cardiac rrr, bbs clear, abd soft with + bowel sounds

## 2023-01-26 NOTE — ASSESSMENT & PLAN NOTE
Holding home lyrica due to kidney function  Plan  Discuss alternative treatment options with Nephro  Patient states cold water foot paths can help

## 2023-01-27 ENCOUNTER — HOSPITAL ENCOUNTER (OUTPATIENT)
Facility: HOSPITAL | Age: 73
Discharge: NURSING, REHAB OR LONG-TERM CARE | End: 2023-02-01
Attending: EMERGENCY MEDICINE | Admitting: FAMILY MEDICINE
Payer: MEDICARE

## 2023-01-27 ENCOUNTER — PATIENT OUTREACH (OUTPATIENT)
Dept: ADMINISTRATIVE | Facility: OTHER | Age: 73
End: 2023-01-27
Payer: MEDICARE

## 2023-01-27 DIAGNOSIS — N17.0 ATN (ACUTE TUBULAR NECROSIS): ICD-10-CM

## 2023-01-27 DIAGNOSIS — T82.9XXA COMPLICATION ASSOCIATED WITH DIALYSIS CATHETER: ICD-10-CM

## 2023-01-27 DIAGNOSIS — E83.39 HYPOPHOSPHATEMIA: ICD-10-CM

## 2023-01-27 DIAGNOSIS — D64.9 ANEMIA, UNSPECIFIED TYPE: ICD-10-CM

## 2023-01-27 DIAGNOSIS — G60.9 IDIOPATHIC NEUROPATHY: ICD-10-CM

## 2023-01-27 DIAGNOSIS — N18.6 ESRD (END STAGE RENAL DISEASE): ICD-10-CM

## 2023-01-27 DIAGNOSIS — E87.1 HYPONATREMIA: ICD-10-CM

## 2023-01-27 DIAGNOSIS — E11.42 TYPE 2 DIABETES MELLITUS WITH DIABETIC POLYNEUROPATHY, WITHOUT LONG-TERM CURRENT USE OF INSULIN: ICD-10-CM

## 2023-01-27 DIAGNOSIS — Z78.9 PROBLEM WITH VASCULAR ACCESS: Primary | ICD-10-CM

## 2023-01-27 LAB
ALBUMIN SERPL BCP-MCNC: 1.8 G/DL (ref 3.5–5.2)
ALP SERPL-CCNC: 83 U/L (ref 55–135)
ALT SERPL W/O P-5'-P-CCNC: 100 U/L (ref 10–44)
ANION GAP SERPL CALC-SCNC: 11 MMOL/L (ref 8–16)
AST SERPL-CCNC: 24 U/L (ref 10–40)
BASOPHILS # BLD AUTO: 0.06 K/UL (ref 0–0.2)
BASOPHILS NFR BLD: 0.3 % (ref 0–1.9)
BILIRUB SERPL-MCNC: 0.3 MG/DL (ref 0.1–1)
BUN SERPL-MCNC: 81 MG/DL (ref 8–23)
CALCIUM SERPL-MCNC: 8.4 MG/DL (ref 8.7–10.5)
CHLORIDE SERPL-SCNC: 99 MMOL/L (ref 95–110)
CO2 SERPL-SCNC: 18 MMOL/L (ref 23–29)
CREAT SERPL-MCNC: 5.8 MG/DL (ref 0.5–1.4)
DIFFERENTIAL METHOD: ABNORMAL
EOSINOPHIL # BLD AUTO: 0.1 K/UL (ref 0–0.5)
EOSINOPHIL NFR BLD: 0.6 % (ref 0–8)
ERYTHROCYTE [DISTWIDTH] IN BLOOD BY AUTOMATED COUNT: 19 % (ref 11.5–14.5)
EST. GFR  (NO RACE VARIABLE): 7 ML/MIN/1.73 M^2
GLUCOSE SERPL-MCNC: 218 MG/DL (ref 70–110)
HCT VFR BLD AUTO: 20.1 % (ref 37–48.5)
HGB BLD-MCNC: 6.8 G/DL (ref 12–16)
IMM GRANULOCYTES # BLD AUTO: 0.85 K/UL (ref 0–0.04)
IMM GRANULOCYTES NFR BLD AUTO: 4.5 % (ref 0–0.5)
LYMPHOCYTES # BLD AUTO: 1.7 K/UL (ref 1–4.8)
LYMPHOCYTES NFR BLD: 8.8 % (ref 18–48)
MAGNESIUM SERPL-MCNC: 2 MG/DL (ref 1.6–2.6)
MCH RBC QN AUTO: 27.8 PG (ref 27–31)
MCHC RBC AUTO-ENTMCNC: 33.8 G/DL (ref 32–36)
MCV RBC AUTO: 82 FL (ref 82–98)
MONOCYTES # BLD AUTO: 2.6 K/UL (ref 0.3–1)
MONOCYTES NFR BLD: 13.9 % (ref 4–15)
NEUTROPHILS # BLD AUTO: 13.6 K/UL (ref 1.8–7.7)
NEUTROPHILS NFR BLD: 71.9 % (ref 38–73)
NRBC BLD-RTO: 0 /100 WBC
PHOSPHATE SERPL-MCNC: 4 MG/DL (ref 2.7–4.5)
PLATELET # BLD AUTO: 399 K/UL (ref 150–450)
PMV BLD AUTO: 10 FL (ref 9.2–12.9)
POTASSIUM SERPL-SCNC: 4.8 MMOL/L (ref 3.5–5.1)
PROT SERPL-MCNC: 6.5 G/DL (ref 6–8.4)
RBC # BLD AUTO: 2.45 M/UL (ref 4–5.4)
SODIUM SERPL-SCNC: 128 MMOL/L (ref 136–145)
WBC # BLD AUTO: 18.94 K/UL (ref 3.9–12.7)

## 2023-01-27 PROCEDURE — G0378 HOSPITAL OBSERVATION PER HR: HCPCS

## 2023-01-27 PROCEDURE — 99285 EMERGENCY DEPT VISIT HI MDM: CPT | Mod: 25

## 2023-01-27 PROCEDURE — 93010 ELECTROCARDIOGRAM REPORT: CPT | Mod: ,,, | Performed by: INTERNAL MEDICINE

## 2023-01-27 PROCEDURE — 85025 COMPLETE CBC W/AUTO DIFF WBC: CPT | Performed by: NURSE PRACTITIONER

## 2023-01-27 PROCEDURE — 93010 EKG 12-LEAD: ICD-10-PCS | Mod: ,,, | Performed by: INTERNAL MEDICINE

## 2023-01-27 PROCEDURE — 93005 ELECTROCARDIOGRAM TRACING: CPT

## 2023-01-27 PROCEDURE — 80053 COMPREHEN METABOLIC PANEL: CPT | Performed by: NURSE PRACTITIONER

## 2023-01-27 PROCEDURE — 84100 ASSAY OF PHOSPHORUS: CPT | Performed by: NURSE PRACTITIONER

## 2023-01-27 PROCEDURE — 83735 ASSAY OF MAGNESIUM: CPT | Performed by: NURSE PRACTITIONER

## 2023-01-27 RX ORDER — IBUPROFEN 200 MG
24 TABLET ORAL
Status: DISCONTINUED | OUTPATIENT
Start: 2023-01-28 | End: 2023-01-27

## 2023-01-27 RX ORDER — IBUPROFEN 200 MG
24 TABLET ORAL
Status: DISCONTINUED | OUTPATIENT
Start: 2023-01-27 | End: 2023-01-27

## 2023-01-27 RX ORDER — LIDOCAINE 50 MG/G
1 PATCH TOPICAL DAILY PRN
Status: DISCONTINUED | OUTPATIENT
Start: 2023-01-27 | End: 2023-01-27

## 2023-01-27 RX ORDER — PANTOPRAZOLE SODIUM 40 MG/1
40 TABLET, DELAYED RELEASE ORAL DAILY
Status: DISCONTINUED | OUTPATIENT
Start: 2023-01-28 | End: 2023-02-01 | Stop reason: HOSPADM

## 2023-01-27 RX ORDER — MECLIZINE HCL 12.5 MG 12.5 MG/1
25 TABLET ORAL 3 TIMES DAILY PRN
Status: DISCONTINUED | OUTPATIENT
Start: 2023-01-27 | End: 2023-02-01 | Stop reason: HOSPADM

## 2023-01-27 RX ORDER — INSULIN ASPART 100 [IU]/ML
1-10 INJECTION, SOLUTION INTRAVENOUS; SUBCUTANEOUS
Status: DISCONTINUED | OUTPATIENT
Start: 2023-01-28 | End: 2023-02-01 | Stop reason: HOSPADM

## 2023-01-27 RX ORDER — CEFEPIME HYDROCHLORIDE 1 G/50ML
2 INJECTION, SOLUTION INTRAVENOUS
Status: DISCONTINUED | OUTPATIENT
Start: 2023-01-28 | End: 2023-01-30

## 2023-01-27 RX ORDER — AMLODIPINE BESYLATE 5 MG/1
5 TABLET ORAL DAILY
Status: DISCONTINUED | OUTPATIENT
Start: 2023-01-28 | End: 2023-02-01 | Stop reason: HOSPADM

## 2023-01-27 RX ORDER — LOPERAMIDE HYDROCHLORIDE 2 MG/1
2 CAPSULE ORAL 2 TIMES DAILY
Status: DISCONTINUED | OUTPATIENT
Start: 2023-01-28 | End: 2023-02-01 | Stop reason: HOSPADM

## 2023-01-27 RX ORDER — TALC
9 POWDER (GRAM) TOPICAL NIGHTLY PRN
Status: DISCONTINUED | OUTPATIENT
Start: 2023-01-28 | End: 2023-02-01 | Stop reason: HOSPADM

## 2023-01-27 RX ORDER — GLUCAGON 1 MG
1 KIT INJECTION
Status: DISCONTINUED | OUTPATIENT
Start: 2023-01-27 | End: 2023-01-27

## 2023-01-27 RX ORDER — SODIUM CHLORIDE 0.9 % (FLUSH) 0.9 %
5 SYRINGE (ML) INJECTION
Status: DISCONTINUED | OUTPATIENT
Start: 2023-01-27 | End: 2023-01-27

## 2023-01-27 RX ORDER — SODIUM CHLORIDE 0.9 % (FLUSH) 0.9 %
5 SYRINGE (ML) INJECTION
Status: DISCONTINUED | OUTPATIENT
Start: 2023-01-28 | End: 2023-02-01 | Stop reason: HOSPADM

## 2023-01-27 RX ORDER — CYANOCOBALAMIN 1000 UG/ML
1000 INJECTION, SOLUTION INTRAMUSCULAR; SUBCUTANEOUS
Status: DISCONTINUED | OUTPATIENT
Start: 2023-02-26 | End: 2023-02-01 | Stop reason: HOSPADM

## 2023-01-27 RX ORDER — HYDROCODONE BITARTRATE AND ACETAMINOPHEN 5; 325 MG/1; MG/1
1 TABLET ORAL EVERY 6 HOURS PRN
Status: DISCONTINUED | OUTPATIENT
Start: 2023-01-27 | End: 2023-02-01 | Stop reason: HOSPADM

## 2023-01-27 RX ORDER — IBUPROFEN 200 MG
16 TABLET ORAL
Status: DISCONTINUED | OUTPATIENT
Start: 2023-01-28 | End: 2023-01-27

## 2023-01-27 RX ORDER — SODIUM CHLORIDE 9 MG/ML
INJECTION, SOLUTION INTRAVENOUS
Status: DISCONTINUED | OUTPATIENT
Start: 2023-01-27 | End: 2023-02-01 | Stop reason: HOSPADM

## 2023-01-27 RX ORDER — ACETAMINOPHEN 325 MG/1
650 TABLET ORAL EVERY 8 HOURS PRN
Status: DISCONTINUED | OUTPATIENT
Start: 2023-01-28 | End: 2023-01-27

## 2023-01-27 RX ORDER — HEPARIN SODIUM 5000 [USP'U]/ML
2400 INJECTION, SOLUTION INTRAVENOUS; SUBCUTANEOUS
Status: DISCONTINUED | OUTPATIENT
Start: 2023-01-27 | End: 2023-02-01 | Stop reason: HOSPADM

## 2023-01-27 RX ORDER — GLUCAGON 1 MG
1 KIT INJECTION
Status: DISCONTINUED | OUTPATIENT
Start: 2023-01-28 | End: 2023-02-01 | Stop reason: HOSPADM

## 2023-01-27 RX ORDER — PROMETHAZINE HYDROCHLORIDE 6.25 MG/5ML
12.5 SYRUP ORAL EVERY 6 HOURS PRN
Status: DISCONTINUED | OUTPATIENT
Start: 2023-01-27 | End: 2023-01-27

## 2023-01-27 RX ORDER — ACETAMINOPHEN 325 MG/1
650 TABLET ORAL EVERY 8 HOURS PRN
Status: DISCONTINUED | OUTPATIENT
Start: 2023-01-28 | End: 2023-02-01 | Stop reason: HOSPADM

## 2023-01-27 RX ORDER — TALC
9 POWDER (GRAM) TOPICAL NIGHTLY PRN
Status: DISCONTINUED | OUTPATIENT
Start: 2023-01-27 | End: 2023-01-27

## 2023-01-27 RX ORDER — ONDANSETRON 2 MG/ML
4 INJECTION INTRAMUSCULAR; INTRAVENOUS EVERY 8 HOURS PRN
Status: DISCONTINUED | OUTPATIENT
Start: 2023-01-27 | End: 2023-02-01 | Stop reason: HOSPADM

## 2023-01-27 RX ORDER — INSULIN ASPART 100 [IU]/ML
1-10 INJECTION, SOLUTION INTRAVENOUS; SUBCUTANEOUS
Status: DISCONTINUED | OUTPATIENT
Start: 2023-01-27 | End: 2023-01-27

## 2023-01-27 RX ORDER — LIDOCAINE 50 MG/G
1 PATCH TOPICAL DAILY PRN
Status: DISCONTINUED | OUTPATIENT
Start: 2023-01-28 | End: 2023-02-01 | Stop reason: HOSPADM

## 2023-01-27 RX ORDER — IBUPROFEN 200 MG
16 TABLET ORAL
Status: DISCONTINUED | OUTPATIENT
Start: 2023-01-27 | End: 2023-01-27

## 2023-01-27 RX ORDER — METRONIDAZOLE 500 MG/100ML
500 INJECTION, SOLUTION INTRAVENOUS
Status: COMPLETED | OUTPATIENT
Start: 2023-01-28 | End: 2023-01-28

## 2023-01-27 RX ORDER — ACETAMINOPHEN 325 MG/1
650 TABLET ORAL EVERY 4 HOURS PRN
Status: DISCONTINUED | OUTPATIENT
Start: 2023-01-28 | End: 2023-01-27

## 2023-01-27 NOTE — PROGRESS NOTES
IP Liaison - Final Visit Note    Patient: Lily Green  MRN:  [id number]  Date of Service:  1/27/2023  Completed by:  GIOVANNA Monroy    Reason for Visit   Patient presents with    IP Liaison Chart Review        Patient Summary     Discharge Date: 1/26/2023  Discharge telephone number/address: [phone] / [facility]  Follow up provider: n/a  Follow up appointments: n/a  Home Health agency & telephone number: n/a  DME ordered &  name: n/a  Assigned OPCM RN/SW: n/a  Report sent to follow up team (PCP/OPCM) via in basket message: n/a  Community Resources arranged including agency name & contact info: n/a      GIOVANNA Monroy

## 2023-01-28 ENCOUNTER — ANESTHESIA (OUTPATIENT)
Dept: SURGERY | Facility: HOSPITAL | Age: 73
End: 2023-01-28
Payer: MEDICARE

## 2023-01-28 ENCOUNTER — ANESTHESIA EVENT (OUTPATIENT)
Dept: SURGERY | Facility: HOSPITAL | Age: 73
End: 2023-01-28
Payer: MEDICARE

## 2023-01-28 PROBLEM — Z78.9 PROBLEM WITH VASCULAR ACCESS: Status: ACTIVE | Noted: 2023-01-28

## 2023-01-28 LAB
ABO + RH BLD: NORMAL
ALBUMIN SERPL BCP-MCNC: 1.8 G/DL (ref 3.5–5.2)
ALP SERPL-CCNC: 84 U/L (ref 55–135)
ALT SERPL W/O P-5'-P-CCNC: 95 U/L (ref 10–44)
ANION GAP SERPL CALC-SCNC: 13 MMOL/L (ref 8–16)
AST SERPL-CCNC: 23 U/L (ref 10–40)
BASOPHILS # BLD AUTO: 0.08 K/UL (ref 0–0.2)
BASOPHILS NFR BLD: 0.4 % (ref 0–1.9)
BILIRUB SERPL-MCNC: 0.3 MG/DL (ref 0.1–1)
BLD GP AB SCN CELLS X3 SERPL QL: NORMAL
BUN SERPL-MCNC: 85 MG/DL (ref 8–23)
CALCIUM SERPL-MCNC: 8.2 MG/DL (ref 8.7–10.5)
CHLORIDE SERPL-SCNC: 97 MMOL/L (ref 95–110)
CO2 SERPL-SCNC: 17 MMOL/L (ref 23–29)
CREAT SERPL-MCNC: 6.1 MG/DL (ref 0.5–1.4)
DIFFERENTIAL METHOD: ABNORMAL
EOSINOPHIL # BLD AUTO: 0.1 K/UL (ref 0–0.5)
EOSINOPHIL NFR BLD: 0.7 % (ref 0–8)
ERYTHROCYTE [DISTWIDTH] IN BLOOD BY AUTOMATED COUNT: 19.3 % (ref 11.5–14.5)
EST. GFR  (NO RACE VARIABLE): 7 ML/MIN/1.73 M^2
FERRITIN SERPL-MCNC: 837 NG/ML (ref 20–300)
GLUCOSE SERPL-MCNC: 150 MG/DL (ref 70–110)
HCT VFR BLD AUTO: 21.2 % (ref 37–48.5)
HGB BLD-MCNC: 7 G/DL (ref 12–16)
IMM GRANULOCYTES # BLD AUTO: 0.83 K/UL (ref 0–0.04)
IMM GRANULOCYTES NFR BLD AUTO: 4.4 % (ref 0–0.5)
IRON SERPL-MCNC: 37 UG/DL (ref 30–160)
LYMPHOCYTES # BLD AUTO: 1.8 K/UL (ref 1–4.8)
LYMPHOCYTES NFR BLD: 9.7 % (ref 18–48)
MCH RBC QN AUTO: 27.6 PG (ref 27–31)
MCHC RBC AUTO-ENTMCNC: 33 G/DL (ref 32–36)
MCV RBC AUTO: 84 FL (ref 82–98)
MONOCYTES # BLD AUTO: 2.4 K/UL (ref 0.3–1)
MONOCYTES NFR BLD: 12.8 % (ref 4–15)
NEUTROPHILS # BLD AUTO: 13.7 K/UL (ref 1.8–7.7)
NEUTROPHILS NFR BLD: 72 % (ref 38–73)
NRBC BLD-RTO: 0 /100 WBC
PLATELET # BLD AUTO: 373 K/UL (ref 150–450)
PMV BLD AUTO: 10.3 FL (ref 9.2–12.9)
POCT GLUCOSE: 136 MG/DL (ref 70–110)
POCT GLUCOSE: 160 MG/DL (ref 70–110)
POCT GLUCOSE: 167 MG/DL (ref 70–110)
POTASSIUM SERPL-SCNC: 4.7 MMOL/L (ref 3.5–5.1)
PROT SERPL-MCNC: 6.4 G/DL (ref 6–8.4)
RBC # BLD AUTO: 2.54 M/UL (ref 4–5.4)
SATURATED IRON: 19 % (ref 20–50)
SODIUM SERPL-SCNC: 127 MMOL/L (ref 136–145)
TOTAL IRON BINDING CAPACITY: 194 UG/DL (ref 250–450)
TRANSFERRIN SERPL-MCNC: 131 MG/DL (ref 200–375)
WBC # BLD AUTO: 19.02 K/UL (ref 3.9–12.7)

## 2023-01-28 PROCEDURE — 37000009 HC ANESTHESIA EA ADD 15 MINS: Performed by: STUDENT IN AN ORGANIZED HEALTH CARE EDUCATION/TRAINING PROGRAM

## 2023-01-28 PROCEDURE — S0030 INJECTION, METRONIDAZOLE: HCPCS | Performed by: STUDENT IN AN ORGANIZED HEALTH CARE EDUCATION/TRAINING PROGRAM

## 2023-01-28 PROCEDURE — G0378 HOSPITAL OBSERVATION PER HR: HCPCS

## 2023-01-28 PROCEDURE — 63600175 PHARM REV CODE 636 W HCPCS: Mod: TB,JG | Performed by: STUDENT IN AN ORGANIZED HEALTH CARE EDUCATION/TRAINING PROGRAM

## 2023-01-28 PROCEDURE — 77001 FLUOROGUIDE FOR VEIN DEVICE: CPT | Mod: 26,,, | Performed by: STUDENT IN AN ORGANIZED HEALTH CARE EDUCATION/TRAINING PROGRAM

## 2023-01-28 PROCEDURE — 77001 CHG FLUOROGUIDE CNTRL VEN ACCESS,PLACE,REPLACE,REMOVE: ICD-10-PCS | Mod: 26,,, | Performed by: STUDENT IN AN ORGANIZED HEALTH CARE EDUCATION/TRAINING PROGRAM

## 2023-01-28 PROCEDURE — 86920 COMPATIBILITY TEST SPIN: CPT | Performed by: STUDENT IN AN ORGANIZED HEALTH CARE EDUCATION/TRAINING PROGRAM

## 2023-01-28 PROCEDURE — 36000707: Performed by: STUDENT IN AN ORGANIZED HEALTH CARE EDUCATION/TRAINING PROGRAM

## 2023-01-28 PROCEDURE — D9220A PRA ANESTHESIA: ICD-10-PCS | Mod: ANES,,, | Performed by: ANESTHESIOLOGY

## 2023-01-28 PROCEDURE — 80100016 HC MAINTENANCE HEMODIALYSIS

## 2023-01-28 PROCEDURE — 36415 COLL VENOUS BLD VENIPUNCTURE: CPT

## 2023-01-28 PROCEDURE — 25000003 PHARM REV CODE 250: Performed by: NURSE ANESTHETIST, CERTIFIED REGISTERED

## 2023-01-28 PROCEDURE — D9220A PRA ANESTHESIA: Mod: CRNA,,, | Performed by: NURSE ANESTHETIST, CERTIFIED REGISTERED

## 2023-01-28 PROCEDURE — 84466 ASSAY OF TRANSFERRIN: CPT

## 2023-01-28 PROCEDURE — 25000003 PHARM REV CODE 250: Performed by: STUDENT IN AN ORGANIZED HEALTH CARE EDUCATION/TRAINING PROGRAM

## 2023-01-28 PROCEDURE — 80053 COMPREHEN METABOLIC PANEL: CPT | Performed by: STUDENT IN AN ORGANIZED HEALTH CARE EDUCATION/TRAINING PROGRAM

## 2023-01-28 PROCEDURE — 85025 COMPLETE CBC W/AUTO DIFF WBC: CPT | Performed by: STUDENT IN AN ORGANIZED HEALTH CARE EDUCATION/TRAINING PROGRAM

## 2023-01-28 PROCEDURE — 82728 ASSAY OF FERRITIN: CPT

## 2023-01-28 PROCEDURE — G0257 UNSCHED DIALYSIS ESRD PT HOS: HCPCS

## 2023-01-28 PROCEDURE — 63600175 PHARM REV CODE 636 W HCPCS: Performed by: STUDENT IN AN ORGANIZED HEALTH CARE EDUCATION/TRAINING PROGRAM

## 2023-01-28 PROCEDURE — 36558 PR INSERT TUNNELED CV CATH W/O PORT OR PUMP: ICD-10-PCS | Mod: 79,LT,, | Performed by: STUDENT IN AN ORGANIZED HEALTH CARE EDUCATION/TRAINING PROGRAM

## 2023-01-28 PROCEDURE — 96365 THER/PROPH/DIAG IV INF INIT: CPT | Mod: 59

## 2023-01-28 PROCEDURE — 71000033 HC RECOVERY, INTIAL HOUR: Performed by: STUDENT IN AN ORGANIZED HEALTH CARE EDUCATION/TRAINING PROGRAM

## 2023-01-28 PROCEDURE — 37000008 HC ANESTHESIA 1ST 15 MINUTES: Performed by: STUDENT IN AN ORGANIZED HEALTH CARE EDUCATION/TRAINING PROGRAM

## 2023-01-28 PROCEDURE — 86900 BLOOD TYPING SEROLOGIC ABO: CPT | Performed by: STUDENT IN AN ORGANIZED HEALTH CARE EDUCATION/TRAINING PROGRAM

## 2023-01-28 PROCEDURE — D9220A PRA ANESTHESIA: ICD-10-PCS | Mod: CRNA,,, | Performed by: NURSE ANESTHETIST, CERTIFIED REGISTERED

## 2023-01-28 PROCEDURE — 94799 UNLISTED PULMONARY SVC/PX: CPT

## 2023-01-28 PROCEDURE — 36415 COLL VENOUS BLD VENIPUNCTURE: CPT | Performed by: STUDENT IN AN ORGANIZED HEALTH CARE EDUCATION/TRAINING PROGRAM

## 2023-01-28 PROCEDURE — C1769 GUIDE WIRE: HCPCS | Performed by: STUDENT IN AN ORGANIZED HEALTH CARE EDUCATION/TRAINING PROGRAM

## 2023-01-28 PROCEDURE — C1750 CATH, HEMODIALYSIS,LONG-TERM: HCPCS | Performed by: STUDENT IN AN ORGANIZED HEALTH CARE EDUCATION/TRAINING PROGRAM

## 2023-01-28 PROCEDURE — 36558 INSERT TUNNELED CV CATH: CPT | Mod: 79,LT,, | Performed by: STUDENT IN AN ORGANIZED HEALTH CARE EDUCATION/TRAINING PROGRAM

## 2023-01-28 PROCEDURE — 94761 N-INVAS EAR/PLS OXIMETRY MLT: CPT

## 2023-01-28 PROCEDURE — D9220A PRA ANESTHESIA: Mod: ANES,,, | Performed by: ANESTHESIOLOGY

## 2023-01-28 PROCEDURE — 36000706: Performed by: STUDENT IN AN ORGANIZED HEALTH CARE EDUCATION/TRAINING PROGRAM

## 2023-01-28 PROCEDURE — 63600175 PHARM REV CODE 636 W HCPCS: Performed by: NURSE ANESTHETIST, CERTIFIED REGISTERED

## 2023-01-28 DEVICE — SET CATH HEMODIALYSIS 15.5FR: Type: IMPLANTABLE DEVICE | Site: NECK | Status: FUNCTIONAL

## 2023-01-28 RX ORDER — KETAMINE HCL IN 0.9 % NACL 50 MG/5 ML
SYRINGE (ML) INTRAVENOUS
Status: DISCONTINUED | OUTPATIENT
Start: 2023-01-28 | End: 2023-01-28

## 2023-01-28 RX ORDER — CEFAZOLIN SODIUM 1 G/3ML
INJECTION, POWDER, FOR SOLUTION INTRAMUSCULAR; INTRAVENOUS
Status: DISCONTINUED | OUTPATIENT
Start: 2023-01-28 | End: 2023-01-28

## 2023-01-28 RX ORDER — ONDANSETRON 2 MG/ML
4 INJECTION INTRAMUSCULAR; INTRAVENOUS DAILY PRN
Status: DISCONTINUED | OUTPATIENT
Start: 2023-01-28 | End: 2023-02-01 | Stop reason: HOSPADM

## 2023-01-28 RX ORDER — SODIUM CHLORIDE 9 MG/ML
INJECTION, SOLUTION INTRAVENOUS ONCE
Status: DISCONTINUED | OUTPATIENT
Start: 2023-01-28 | End: 2023-02-01 | Stop reason: HOSPADM

## 2023-01-28 RX ORDER — BUPIVACAINE HYDROCHLORIDE 5 MG/ML
INJECTION, SOLUTION PERINEURAL
Status: DISCONTINUED | OUTPATIENT
Start: 2023-01-28 | End: 2023-01-28 | Stop reason: HOSPADM

## 2023-01-28 RX ORDER — SODIUM CHLORIDE 0.9 % (FLUSH) 0.9 %
10 SYRINGE (ML) INJECTION
Status: DISCONTINUED | OUTPATIENT
Start: 2023-01-28 | End: 2023-02-01 | Stop reason: HOSPADM

## 2023-01-28 RX ORDER — HYDROMORPHONE HYDROCHLORIDE 2 MG/ML
0.2 INJECTION, SOLUTION INTRAMUSCULAR; INTRAVENOUS; SUBCUTANEOUS EVERY 5 MIN PRN
Status: DISCONTINUED | OUTPATIENT
Start: 2023-01-28 | End: 2023-02-01 | Stop reason: HOSPADM

## 2023-01-28 RX ORDER — HEPARIN SODIUM 1000 [USP'U]/ML
INJECTION, SOLUTION INTRAVENOUS; SUBCUTANEOUS
Status: DISCONTINUED | OUTPATIENT
Start: 2023-01-28 | End: 2023-01-28 | Stop reason: HOSPADM

## 2023-01-28 RX ORDER — MIDAZOLAM HYDROCHLORIDE 1 MG/ML
INJECTION, SOLUTION INTRAMUSCULAR; INTRAVENOUS
Status: DISCONTINUED | OUTPATIENT
Start: 2023-01-28 | End: 2023-01-28

## 2023-01-28 RX ORDER — LIDOCAINE HYDROCHLORIDE 10 MG/ML
INJECTION INFILTRATION; PERINEURAL
Status: DISCONTINUED | OUTPATIENT
Start: 2023-01-28 | End: 2023-01-28 | Stop reason: HOSPADM

## 2023-01-28 RX ORDER — ETOMIDATE 2 MG/ML
INJECTION INTRAVENOUS
Status: DISCONTINUED | OUTPATIENT
Start: 2023-01-28 | End: 2023-01-28

## 2023-01-28 RX ADMIN — SODIUM CHLORIDE: 0.9 INJECTION, SOLUTION INTRAVENOUS at 10:01

## 2023-01-28 RX ADMIN — MIDAZOLAM 1 MG: 1 INJECTION INTRAMUSCULAR; INTRAVENOUS at 11:01

## 2023-01-28 RX ADMIN — ETOMIDATE 2 MG: 2 INJECTION, SOLUTION INTRAVENOUS at 10:01

## 2023-01-28 RX ADMIN — LOPERAMIDE HYDROCHLORIDE 2 MG: 2 CAPSULE ORAL at 08:01

## 2023-01-28 RX ADMIN — Medication 9 MG: at 01:01

## 2023-01-28 RX ADMIN — MIDAZOLAM 1 MG: 1 INJECTION INTRAMUSCULAR; INTRAVENOUS at 10:01

## 2023-01-28 RX ADMIN — Medication 10 MG: at 11:01

## 2023-01-28 RX ADMIN — Medication 9 MG: at 09:01

## 2023-01-28 RX ADMIN — CEFAZOLIN 2 G: 330 INJECTION, POWDER, FOR SOLUTION INTRAMUSCULAR; INTRAVENOUS at 10:01

## 2023-01-28 RX ADMIN — CEFEPIME HYDROCHLORIDE 2 G: 2 INJECTION, SOLUTION INTRAVENOUS at 06:01

## 2023-01-28 RX ADMIN — Medication 10 MG: at 10:01

## 2023-01-28 RX ADMIN — LOPERAMIDE HYDROCHLORIDE 2 MG: 2 CAPSULE ORAL at 09:01

## 2023-01-28 RX ADMIN — AMLODIPINE BESYLATE 5 MG: 5 TABLET ORAL at 08:01

## 2023-01-28 RX ADMIN — HYDROCODONE BITARTRATE AND ACETAMINOPHEN 1 TABLET: 5; 325 TABLET ORAL at 08:01

## 2023-01-28 RX ADMIN — METRONIDAZOLE 500 MG: 5 INJECTION, SOLUTION INTRAVENOUS at 02:01

## 2023-01-28 RX ADMIN — PANTOPRAZOLE SODIUM 40 MG: 40 TABLET, DELAYED RELEASE ORAL at 08:01

## 2023-01-28 NOTE — SUBJECTIVE & OBJECTIVE
Past Medical History:   Diagnosis Date    Anxiety     Celiac disease 2018    Celiac disease     Depression     Diabetes mellitus     Family history of breast cancer in mother      at age 68    Hyperlipidemia     Hypertension     Meniere disease     Menopause     Peptic ulcer     Reflux esophagitis     Urinary tract infection     Vaginal infection     Vaginal Pap smear 2014    Pap/Hpv Negative        Past Surgical History:   Procedure Laterality Date    APPENDECTOMY      BREAST SURGERY      Tags    CARPAL TUNNEL RELEASE Bilateral 2017    ,     CLOSURE, COLOSTOMY Left 2023    Procedure: CLOSURE, COLOSTOMY;  Surgeon: Manuel Javier MD;  Location: [facility] OR;  Service: General;  Laterality: Left;    COLONOSCOPY  2018    Normal  ( Juan A)     COLOSTOMY Right 2023    Procedure: CREATION, COLOSTOMY;  Surgeon: Manuel Javier MD;  Location: [facility] OR;  Service: General;  Laterality: Right;    DILATION AND CURETTAGE OF UTERUS  1986    DUPUYTREN CONTRACTURE RELEASE Bilateral 2017    HYSTERECTOMY  1987    BEAU w/ appy, no BSO     INJECTION OF NEUROLYTIC AGENT AROUND LUMBAR SYMPATHETIC NERVE N/A 2022    Procedure: BLOCK, LUMBAR SYMPATHETIC;  Surgeon: Souleymane Rizo Jr., MD;  Location: [facility] PAIN MGT;  Service: Pain Management;  Laterality: N/A;  no pacemaker   pt is diabetic     INJECTION OF NEUROLYTIC AGENT AROUND LUMBAR SYMPATHETIC NERVE N/A 2022    Procedure: BLOCK, LUMBAR SYMPATHETIC;  Surgeon: Souleymane Rizo Jr., MD;  Location: [facility] PAIN MGT;  Service: Pain Management;  Laterality: N/A;  diabetic     INSERTION OF TUNNELED CENTRAL VENOUS HEMODIALYSIS CATHETER Right 2023    Procedure: INSERTION, CATHETER, HEMODIALYSIS, DUAL LUMEN;  Surgeon: Manuel Javier MD;  Location: [facility] OR;  Service: General;  Laterality: Right;    LAPAROTOMY, EXPLORATORY N/A 2023    Procedure: LAPAROTOMY, EXPLORATORY;  Surgeon: Manuel Javier MD;  Location: [facility] OR;  Service:  General;  Laterality: N/A;    LAPAROTOMY, EXPLORATORY N/A 1/16/2023    Procedure: LAPAROTOMY, EXPLORATORY;  Surgeon: Manuel Javier MD;  Location: [facility] OR;  Service: General;  Laterality: N/A;    REMOVAL, TUNNELED CATH Right 1/25/2023    Procedure: REMOVAL, TUNNELED CATH;  Surgeon: Manuel Javier MD;  Location: [facility] OR;  Service: General;  Laterality: Right;    SHOULDER SURGERY  2009 & 2010    right rotator cuff    TONSILLECTOMY      UPPER GASTROINTESTINAL ENDOSCOPY  04/2018       Review of patient's allergies indicates:   Allergen Reactions    Crestor [rosuvastatin] Swelling    Gluten protein        Current Facility-Administered Medications on File Prior to Encounter   Medication    [MAR Hold - Suspended Admission] 0.9%  NaCl infusion    [MAR Hold - Suspended Admission] 0.9%  NaCl infusion    [MAR Hold - Suspended Admission] amLODIPine tablet 5 mg    [MAR Hold - Suspended Admission] cefepime in dextrose 5 % IVPB 2 g    [MAR Hold - Suspended Admission] cyanocobalamin injection 1,000 mcg    [MAR Hold - Suspended Admission] dextrose 10% bolus 125 mL 125 mL    [MAR Hold - Suspended Admission] dextrose 10% bolus 125 mL 125 mL    [MAR Hold - Suspended Admission] dextrose 10% bolus 250 mL 250 mL    [MAR Hold - Suspended Admission] glucagon (human recombinant) injection 1 mg    [MAR Hold - Suspended Admission] glucose chewable tablet 16 g    [MAR Hold - Suspended Admission] glucose chewable tablet 24 g    [MAR Hold - Suspended Admission] heparin (porcine) injection 2,400 Units    [MAR Hold - Suspended Admission] HYDROcodone-acetaminophen 5-325 mg per tablet 1 tablet    [MAR Hold - Suspended Admission] influenza 65up-adj (QUADRIVALENT ADJUVANTED PF) vaccine 0.5 mL    [MAR Hold - Suspended Admission] insulin aspart U-100 pen 1-10 Units    [MAR Hold - Suspended Admission] LIDOcaine 5 % patch 1 patch    [MAR Hold - Suspended Admission] loperamide capsule 2 mg    [MAR Hold - Suspended Admission] meclizine tablet 25 mg     [MAR Hold - Suspended Admission] melatonin tablet 9 mg    [MAR Hold - Suspended Admission] metronidazole IVPB 500 mg    [MAR Hold - Suspended Admission] ondansetron injection 4 mg    [MAR Hold - Suspended Admission] pantoprazole EC tablet 40 mg    [MAR Hold - Suspended Admission] promethazine 6.25 mg/5 mL syrup 12.5 mg    [MAR Hold - Suspended Admission] sodium chloride 0.9% bolus 250 mL 250 mL    [MAR Hold - Suspended Admission] sodium chloride 0.9% bolus 250 mL 250 mL    [MAR Hold - Suspended Admission] sodium chloride 0.9% bolus 250 mL 250 mL    [MAR Hold - Suspended Admission] sodium chloride 0.9% flush 5 mL     Current Outpatient Medications on File Prior to Encounter   Medication Sig    alpha lipoic acid 600 mg Cap     atorvastatin (LIPITOR) 40 MG tablet TAKE 1 TABLET BY MOUTH ONCE DAILY    betahistine HCl (BETAHISTINE, BULK, MISC)     blood sugar diagnostic (TRUE METRIX GLUCOSE TEST STRIP) Strp USE ONE STRIP FOR TESTING 2 TIMES A DAY    blood-glucose meter kit Use to test twice a day    calcium carbonate/vitamin D3 (CALTRATE WITH VITAMIN D3 ORAL) Take by mouth.    cetirizine HCl (ZYRTEC ORAL)     cinnamon bark (CINNAMON ORAL) Take by mouth.    coenzyme Q10 100 mg capsule     cyanocobalamin 1,000 mcg/mL injection Inject 1 mL (1,000 mcg total) into the muscle every 30 days.    dicyclomine (BENTYL) 10 MG capsule     DULoxetine (CYMBALTA) 30 MG capsule Take 1 capsule (30 mg total) by mouth 2 (two) times daily.    econazole nitrate 1 % cream Apply topically 2 (two) times daily.    finasteride (PROSCAR) 5 mg tablet     flaxseed oil 1,000 mg Cap once a day    gluc-condr-om3-dha-epa-fish-st [phone] mg Cap once a day    glucosamine HCl 1,500 mg Tab Take 1,500 mg by mouth.    KRILL OIL ORAL Take by mouth.    lancets Misc 1 lancet by Misc.(Non-Drug; Combo Route) route once daily.    meclizine (ANTIVERT) 25 mg tablet Take 1 tablet (25 mg total) by mouth 3 (three) times daily as needed for Dizziness.    metFORMIN  (GLUCOPHAGE) 500 MG tablet Take 1 tablet (500 mg total) by mouth 3 (three) times daily. 2 po qam, 1 po qhs. (Patient taking differently: Take 500 mg by mouth 2 (two) times daily. 1 po qam, 1 po qhs.)    MILK THISTLE ORAL Take by mouth.    pantoprazole (PROTONIX) 40 MG tablet TAKE 1 TABLET BY MOUTH ONCE DAILY     pregabalin (LYRICA) 150 MG capsule Take 1 capsule (150 mg total) by mouth 2 (two) times daily.    temazepam (RESTORIL) 30 mg capsule TAKE ONE CAPSULE BY MOUTH NIGHTLY AS NEEDED FOR INSOMNIA    triamcinolone acetonide 0.1% (KENALOG) 0.1 % ointment Apply topically 2 (two) times daily as needed (rash).    triamterene-hydrochlorothiazide 37.5-25 mg (MAXZIDE-25) 37.5-25 mg per tablet     vit C,S-Gf-ahzlr-lutein-zeaxan (PRESERVISION AREDS 2) [phone] mg-unit-mg-mg Cap     vitamins  A,C,E-zinc-copper [phone] unit-mg-unit Cap Take 1 tablet by mouth once daily.     Family History       Problem Relation (Age of Onset)    Arthritis Father    Breast cancer Mother, Paternal Aunt    Cancer Mother, Paternal Aunt    Diabetes Paternal Grandfather    Hyperlipidemia Sister    Vision loss Father, Mother, Sister          Tobacco Use    Smoking status: Never    Smokeless tobacco: Never   Substance and Sexual Activity    Alcohol use: No    Drug use: Never    Sexual activity: Not Currently     Partners: Male     Birth control/protection: See Surgical Hx     Comment: :      Review of Systems   Constitutional:  Positive for fatigue. Negative for fever.   HENT:  Negative for congestion and sore throat.    Eyes:  Negative for visual disturbance.   Respiratory:  Negative for apnea, shortness of breath and wheezing.    Cardiovascular:  Negative for chest pain.   Gastrointestinal:  Positive for abdominal pain and diarrhea. Negative for nausea and vomiting.   Genitourinary:  Negative for dysuria.   Musculoskeletal:  Negative for arthralgias and back pain.   Neurological:  Negative for dizziness and headaches.         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    Objective:     Vital Signs (Most Recent):  Temp: 98.2 °F (36.8 °C) (01/27/23 1758)  Pulse: 88 (01/27/23 2322)  Resp: 16 (01/27/23 2322)  BP: 135/68 (01/27/23 2322)  SpO2: 96 % (01/27/23 2322) Vital Signs (24h Range):  Temp:  [96.5 °F (35.8 °C)-98.2 °F (36.8 °C)] 98.2 °F (36.8 °C)  Pulse:  [] 88  Resp:  [16-21] 16  SpO2:  [96 %-99 %] 96 %  BP: (130-172)/(61-87) 135/68        There is no height or weight on file to calculate BMI.    Physical Exam  HENT:      Head: Normocephalic.      Comments: Contusion over left eye     Right Ear: External ear normal.      Left Ear: External ear normal.      Nose: Nose normal.      Mouth/Throat:      Mouth: Mucous membranes are moist.   Eyes:      Pupils: Pupils are equal, round, and reactive to light.   Cardiovascular:      Rate and Rhythm: Normal rate.      Pulses: Normal pulses.   Pulmonary:      Effort: Pulmonary effort is normal.   Abdominal:      General: Abdomen is flat.      Tenderness: There is abdominal tenderness.      Comments: Laparotomy and Ostomy sites clear clean and intact  Profuse solid output per ileostomy    Skin:     General: Skin is warm.   Neurological:      Mental Status: She is alert.      Comments: Follows all commands  Limited speech, able to communicate non verbally   Psychiatric:      Comments: Some confusion          CRANIAL NERVES     CN III, IV, VI   Pupils are equal, round, and reactive to light.     Significant Labs: All pertinent labs within the past 24 hours have been reviewed.  CBC:   Recent Labs   Lab 01/26/23  0636 01/27/23 2213   WBC 19.24* 18.94*   HGB 8.5* 6.8*   HCT 26.3* 20.1*    399     CMP:   Recent Labs   Lab 01/26/23  0636 01/27/23 2213   * 128*   K 4.8 4.8    99   CO2 16* 18*   GLU 97 218*   BUN 67* 81*   CREATININE 5.6* 5.8*   CALCIUM 8.4* 8.4*   PROT 6.4 6.5   ALBUMIN 1.8* 1.8*   BILITOT 0.4 0.3   ALKPHOS 90 83   AST  34 24   * 100*   ANIONGAP 15 11       Significant Imaging: I have reviewed all pertinent imaging results/findings within the past 24 hours.

## 2023-01-28 NOTE — ASSESSMENT & PLAN NOTE
#Gram negative Bacteremia  -CT Abd shows possible hematoma but no signs fo acute infection or abscess   - Gram negative rods reported in Blood culture from 1/13  WBC improved to 43 form 45 1/22/23    Plan  - Continue Cefepime, flagyl for total of 14 day course End date 1/28/23  -  All further cultures on 1/17 w/ NGT  - afebrile  - PT/OT

## 2023-01-28 NOTE — ASSESSMENT & PLAN NOTE
Likely 2/2 to shock from prior admission  Tunnel cath placed 1/25/23  Line became blocked at HD on 1/25/23 and 1/27/23    Plan  - Consulted surgery for line evaluation  -Strict I&Os,maintain euvolemia   -Avoid renal toxic meds   -Continue to monitor renal status and urine output   -Nephro consulted. Appreciate recs. Plan to reassess HD access

## 2023-01-28 NOTE — ED PROVIDER NOTES
Encounter Date: 2023    SCRIBE #1 NOTE: I, Lillie Parada, am scribing for, and in the presence of,  Monique Mercado MD. I have scribed the following portions of the note - Other sections scribed: HPI and ROS.     History     Chief Complaint   Patient presents with    Vascular Access Problem     Sent to ED from [facility]. Pt was unable to have hemodialysis today.      Lily Green is a 72 y.o. female who  has a past medical history of Anxiety, Celiac disease (2018), Celiac disease, Depression, Diabetes mellitus, Family history of breast cancer in mother, Hyperlipidemia, Hypertension, Meniere disease, Menopause, Peptic ulcer, Reflux esophagitis, Urinary tract infection, Vaginal infection, and Vaginal Pap smear (2014).    The patient presents to the ED via [facility] for evaluation of vascular access problem, onset prior to arrival. The patient's relative reports that there was a complication with her dialysis access port located on the right-side of the chest. As a result, it was replaced on Wednesday (). The relative noted that the nurse performing the dialysis on Wednesday still had problems and only performed a partial dialysis. On the following Thursday, a complete dialysis was performed and she was discharged at 1900 on Friday. Under her Nephrologist recommendation, the patient was transferred to [facility] for port exchange. The patient usually receives dialysis on Monday, Wednesday, and Friday.      The history is provided by a relative and the patient. No  was used.   Review of patient's allergies indicates:   Allergen Reactions    Crestor [rosuvastatin] Swelling    Gluten protein      Past Medical History:   Diagnosis Date    Anxiety     Celiac disease 2018    Celiac disease     Depression     Diabetes mellitus     Family history of breast cancer in mother      at age 68    Hyperlipidemia     Hypertension     Meniere  disease     Menopause     Peptic ulcer     Reflux esophagitis     Urinary tract infection     Vaginal infection     Vaginal Pap smear 04/07/2014    Pap/Hpv Negative      Past Surgical History:   Procedure Laterality Date    APPENDECTOMY      BREAST SURGERY      Tags    CARPAL TUNNEL RELEASE Bilateral 09/2017    ,     CLOSURE, COLOSTOMY Left 1/16/2023    Procedure: CLOSURE, COLOSTOMY;  Surgeon: Manuel Javier MD;  Location: [facility] OR;  Service: General;  Laterality: Left;    COLONOSCOPY  04/2018    Normal  ( Juan A)     COLOSTOMY Right 1/16/2023    Procedure: CREATION, COLOSTOMY;  Surgeon: Manuel Javier MD;  Location: [facility] OR;  Service: General;  Laterality: Right;    DILATION AND CURETTAGE OF UTERUS  1986    DUPUYTREN CONTRACTURE RELEASE Bilateral 09/2017    HYSTERECTOMY  1987    BEAU w/ appy, no BSO     INJECTION OF NEUROLYTIC AGENT AROUND LUMBAR SYMPATHETIC NERVE N/A 1/6/2022    Procedure: BLOCK, LUMBAR SYMPATHETIC;  Surgeon: Souleymane Rizo Jr., MD;  Location: [facility] PAIN MGT;  Service: Pain Management;  Laterality: N/A;  no pacemaker   pt is diabetic     INJECTION OF NEUROLYTIC AGENT AROUND LUMBAR SYMPATHETIC NERVE N/A 8/25/2022    Procedure: BLOCK, LUMBAR SYMPATHETIC;  Surgeon: Souleymane Rizo Jr., MD;  Location: [facility] PAIN MGT;  Service: Pain Management;  Laterality: N/A;  diabetic     INSERTION OF TUNNELED CENTRAL VENOUS HEMODIALYSIS CATHETER Right 1/25/2023    Procedure: INSERTION, CATHETER, HEMODIALYSIS, DUAL LUMEN;  Surgeon: Manuel Javier MD;  Location: [facility] OR;  Service: General;  Laterality: Right;    LAPAROTOMY, EXPLORATORY N/A 1/14/2023    Procedure: LAPAROTOMY, EXPLORATORY;  Surgeon: Manuel Javier MD;  Location: [facility] OR;  Service: General;  Laterality: N/A;    LAPAROTOMY, EXPLORATORY N/A 1/16/2023    Procedure: LAPAROTOMY, EXPLORATORY;  Surgeon: Manuel Javier MD;  Location: [facility] OR;  Service: General;  Laterality: N/A;    REMOVAL, TUNNELED CATH Right 1/25/2023    Procedure:  REMOVAL, TUNNELED CATH;  Surgeon: Manuel Javier MD;  Location: [facility] OR;  Service: General;  Laterality: Right;    SHOULDER SURGERY  2009 & 2010    right rotator cuff    TONSILLECTOMY      UPPER GASTROINTESTINAL ENDOSCOPY  04/2018     Family History   Problem Relation Age of Onset    Vision loss Father     Arthritis Father     Breast cancer Mother     Cancer Mother     Vision loss Mother     Hyperlipidemia Sister     Breast cancer Paternal Aunt     Cancer Paternal Aunt     Diabetes Paternal Grandfather     Vision loss Sister     Kidney disease Neg Hx      Social History     Tobacco Use    Smoking status: Never    Smokeless tobacco: Never   Substance Use Topics    Alcohol use: No    Drug use: Never     Review of Systems   Constitutional:         Vascular Access Problem   All other systems reviewed and are negative.    Physical Exam     Initial Vitals [01/27/23 1758]   BP Pulse Resp Temp SpO2   139/70 90 18 98.2 °F (36.8 °C) 99 %      MAP       --         Physical Exam    Nursing note and vitals reviewed.  Constitutional:   Elderly chronically ill-appearing no acute distress   HENT:   Head: Normocephalic and atraumatic.   Eyes: Pupils are equal, round, and reactive to light.   Neck:   Normal range of motion.  Cardiovascular:  Normal rate and regular rhythm.           Pulmonary/Chest: Breath sounds normal. No respiratory distress.   Right-sided dialysis catheter noted flushes, but does not withdrawal   Abdominal: Abdomen is soft.   Soft changes noted mild abdominal tenderness to palpation   Musculoskeletal:         General: Normal range of motion.      Cervical back: Normal range of motion.     Neurological: She is alert.   Skin: Skin is warm and dry. Capillary refill takes less than 2 seconds.       ED Course   Procedures  Labs Reviewed   CBC W/ AUTO DIFFERENTIAL - Abnormal; Notable for the following components:       Result Value    WBC 18.94 (*)     RBC 2.45 (*)     Hemoglobin 6.8 (*)     Hematocrit 20.1 (*)      RDW 19.0 (*)     Immature Granulocytes 4.5 (*)     Gran # (ANC) 13.6 (*)     Immature Grans (Abs) 0.85 (*)     Mono # 2.6 (*)     Lymph % 8.8 (*)     All other components within normal limits   COMPREHENSIVE METABOLIC PANEL - Abnormal; Notable for the following components:    Sodium 128 (*)     CO2 18 (*)     Glucose 218 (*)     BUN 81 (*)     Creatinine 5.8 (*)     Calcium 8.4 (*)     Albumin 1.8 (*)      (*)     eGFR 7 (*)     All other components within normal limits   MAGNESIUM   PHOSPHORUS     EKG Readings: (Independently Interpreted)   Sinus rhythm 90 beats per minute no STEMI     Imaging Results              X-Ray Chest 1 View (Final result)  Result time 01/27/23 23:32:56      Final result by Wyatt Martinez DO (01/27/23 23:32:56)                   Impression:      Slight kink within the distal aspect of the central venous catheter.      Electronically signed by: Wyatt Martinez  Date:    01/27/2023  Time:    23:32               Narrative:    EXAMINATION:  XR CHEST 1 VIEW    CLINICAL HISTORY:  Unspecified complication of cardiac and vascular prosthetic device, implant and graft, initial encounter    TECHNIQUE:  Single frontal view of the chest was performed.    COMPARISON:  01/14/2023.    FINDINGS:  There is a dual lumen right internal jugular central venous catheter terminating in the brachiocephalic vein or high SVC.  There is a suspected slight kink in the distal aspect of the catheter.  The lungs are well expanded and clear.  The pleural spaces are clear.  The cardiac silhouette is unremarkable.  The visualized osseous structures demonstrate degenerative changes.                                       Medications   amLODIPine tablet 5 mg (has no administration in time range)   cefepime in dextrose 5 % IVPB 2 g (has no administration in time range)   dextrose 10% bolus 125 mL 125 mL (has no administration in time range)   dextrose 10% bolus 250 mL 250 mL (has no administration in time range)    heparin (porcine) injection 2,400 Units (has no administration in time range)   HYDROcodone-acetaminophen 5-325 mg per tablet 1 tablet (has no administration in time range)   loperamide capsule 2 mg (has no administration in time range)   metronidazole IVPB 500 mg (500 mg Intravenous New Bag 1/28/23 0202)   ondansetron injection 4 mg (has no administration in time range)   cyanocobalamin injection 1,000 mcg (has no administration in time range)   meclizine tablet 25 mg (has no administration in time range)   pantoprazole EC tablet 40 mg (has no administration in time range)   0.9%  NaCl infusion (has no administration in time range)   sodium chloride 0.9% flush 5 mL (has no administration in time range)   melatonin tablet 9 mg (9 mg Oral Given 1/28/23 0158)   LIDOcaine 5 % patch 1 patch (has no administration in time range)   insulin aspart U-100 pen 1-10 Units (has no administration in time range)   glucagon (human recombinant) injection 1 mg (has no administration in time range)   dextrose 10% bolus 125 mL 125 mL (has no administration in time range)   acetaminophen tablet 650 mg (has no administration in time range)     Medical Decision Making:   Initial Assessment:   Pleasant 72-year-old female presents the ER for evaluation says access problem.  Patient was recently discharged with newly placed support on the right chest, today during unable to initiate.  Per chart review, Nephrology recommend pt to return to [facility] ED for port exchange.  Patient arrived in the ER no acute distress.  Hemodynamically stable.  Will plan blood work, x-ray, and likely observation.        Scribe Attestation:   Scribe #1: I performed the above scribed service and the documentation accurately describes the services I performed. I attest to the accuracy of the note.      ED Course as of 01/28/23 0208 Fri Jan 27, 2023   2200 In no acute distress, discussed with hospitals [facility] who accepted patient to their service.   Waiting rest of blood work. [SE]      ED Course User Index  [SE] Monique Mercado MD                   My Scribe Attestation: I acknowledge that the documentation on this chart was provided by described on the date of service noted above and that the documentation in the chart accurately reflects work and decisions made by me alone.      Clinical Impression:   Final diagnoses:  [N18.6] ESRD (end stage renal disease)  [T82.9XXA] Complication associated with dialysis catheter  [Z78.9] Problem with vascular access (Primary)        ED Disposition Condition    Observation Stable                Monique Mercado MD  01/28/23 0208

## 2023-01-28 NOTE — PT/OT/SLP PROGRESS
Physical Therapy  Missed visit/ Evaluation    Patient Name:  Lily Green   MRN:  [id number]    Patient not seen today secondary to Off the floor for procedure/surgery. Pt is  in the OR having  a new dialysis catheter placement and then will receive HD after recovery.  Will follow-up in am for PT evaluation.  Farhana Beaulieu, PT  1/28/2023

## 2023-01-28 NOTE — ASSESSMENT & PLAN NOTE
Pt remains hyponatremic, potentially 2/2 to volume overload, missed HD    Plan  Follow nephro Recs  Fix HD access to resume HD

## 2023-01-28 NOTE — PT/OT/SLP PROGRESS
Occupational Therapy      Patient Name:  Lily Green   MRN:  [id number]    11:32 AM Patient not seen today secondary to Off the floor for procedure/surgery. Pt is in the OR having  a new dialysis catheter placement and then will receive HD after recovery.  Will follow-up in am for OT evaluation.    1/28/2023

## 2023-01-28 NOTE — NURSING
Patient transferred from ED to room 473. Patient AAOx1. Disoriented to time and place. Sutures noted to right IJ. Bruising noted to left eye and crusted over cut to left lower lip. Steri strips in place to Abdomen with no drainage noted. Sutures noted to left abdomen. Right Perma cath noted with saline flushes attached to line. Placed pure wick to patient. BLE heels elevated.  at bedside.

## 2023-01-28 NOTE — ANESTHESIA PREPROCEDURE EVALUATION
[facility]  Anesthesia Pre-Operative Evaluation         Patient Name: Lily Green  YOB: 1950  MRN: [id number]    SUBJECTIVE:     Pre-operative evaluation for Procedure(s) (LRB):  INSERTION, CATHETER, HEMODIALYSIS, DUAL LUMEN (N/A)     01/28/2023    Lily Green is a 72 y.o. female w/ a significant PMHx of admission 1/13/23 for AMS, N/V/D s/p discovery of ?colon mass s/p resection and repeat exlap for bleeding now with ATN and acute renal failure who presents for the above procedure.    Pt getting dialyzed currently through temp line in Right IJ.     Labs largely stable    TTE on admit remarkable for EF of 35% with mild MR      Prev airway: Grade I view with Frances 3      Patient Active Problem List   Diagnosis    Type 2 diabetes mellitus with diabetic polyneuropathy, without long-term current use of insulin    Hyperlipidemia, mixed    GERD without esophagitis    Mild recurrent major depression    Primary insomnia    Chronic neck pain    DDD (degenerative disc disease), cervical    Screening mammogram, encounter for    Celiac disease    Hand arthritis    Benign essential tremor    Chronic pain of both knees    Irritable bowel syndrome    TANIA (stress urinary incontinence, female)    Meniere's disease of both ears    Ankle weakness    Psoriasis    Idiopathic neuropathy    Bilateral foot pain    Generalized anxiety disorder    Cognitive complaints    Peritonitis    Shock    Large bowel ischemia    ATN (acute tubular necrosis)    HINA (acute kidney injury)    Acute hypoxemic respiratory failure    Elevated lactic acid level    Hyponatremia    Primary hypertension       Review of patient's allergies indicates:   Allergen Reactions    Crestor [rosuvastatin] Swelling    Gluten protein        Current Inpatient Medications:      Current Facility-Administered Medications on File Prior to Visit   Medication Dose Route Frequency  Provider Last Rate Last Admin    [MAR Hold - Suspended Admission] 0.9%  NaCl infusion   Intravenous PRN Jemima Warren MD        [MAR Hold - Suspended Admission] 0.9%  NaCl infusion   Intravenous Once Jemima Warren MD        0.9%  NaCl infusion   Intravenous PRN Asad Valle MD        0.9%  NaCl infusion   Intravenous Once Vernon Almanza MD        acetaminophen tablet 650 mg  650 mg Oral Q8H PRN Asad Valle MD        alteplase injection 4 mg  4 mg Intra-Catheter PRN Vernon Almanza MD        [MAR Hold - Suspended Admission] amLODIPine tablet 5 mg  5 mg Oral Daily Bal Burr MD   5 mg at 01/27/23 1009    amLODIPine tablet 5 mg  5 mg Oral Daily Asad Valle MD   5 mg at 01/28/23 0826    [MAR Hold - Suspended Admission] cefepime in dextrose 5 % IVPB 2 g  2 g Intravenous Q24H Bal Burr MD        cefepime in dextrose 5 % IVPB 2 g  2 g Intravenous Q24H Asad Valle MD        [MAR Hold - Suspended Admission] cyanocobalamin injection 1,000 mcg  1,000 mcg Intramuscular Q30 Days Bal Burr MD        [START ON 2/26/2023] cyanocobalamin injection 1,000 mcg  1,000 mcg Intramuscular Q30 Days Asad Valle MD        [MAR Hold - Suspended Admission] dextrose 10% bolus 125 mL 125 mL  12.5 g Intravenous PRN Bal Burr MD        [MAR Hold - Suspended Admission] dextrose 10% bolus 125 mL 125 mL  12.5 g Intravenous PRN Bal Burr MD        dextrose 10% bolus 125 mL 125 mL  12.5 g Intravenous PRN Asad Valle MD        dextrose 10% bolus 125 mL 125 mL  12.5 g Intravenous PRN Asad Valle MD        [MAR Hold - Suspended Admission] dextrose 10% bolus 250 mL 250 mL  25 g Intravenous PRN Bal Burr MD        dextrose 10% bolus 250 mL 250 mL  25 g Intravenous PRN Asad Valle MD        [MAR Hold - Suspended Admission] glucagon (human recombinant) injection 1 mg  1 mg Intramuscular PRN Bal Burr MD        glucagon (human recombinant) injection 1 mg  1 mg Intramuscular PRN Asad Valle MD         [MAR Hold - Suspended Admission] glucose chewable tablet 16 g  16 g Oral PRN Bal Burr MD        [MAR Hold - Suspended Admission] glucose chewable tablet 24 g  24 g Oral PRN Bal Burr MD        [MAR Hold - Suspended Admission] heparin (porcine) injection 2,400 Units  2,400 Units Intra-Catheter PRN Bal Burr MD        heparin (porcine) injection 2,400 Units  2,400 Units Intra-Catheter PRN Asad Valle MD        [MAR Hold - Suspended Admission] HYDROcodone-acetaminophen 5-325 mg per tablet 1 tablet  1 tablet Oral Q6H PRN Bal Burr MD   1 tablet at 01/27/23 1009    HYDROcodone-acetaminophen 5-325 mg per tablet 1 tablet  1 tablet Oral Q6H PRN Asad Valle MD        [MAR Hold - Suspended Admission] influenza 65up-adj (QUADRIVALENT ADJUVANTED PF) vaccine 0.5 mL  0.5 mL Intramuscular vaccine x 1 dose Bal Burr MD        [MAR Hold - Suspended Admission] insulin aspart U-100 pen 1-10 Units  1-10 Units Subcutaneous QID (AC + HS) PRN Bal Burr MD   2 Units at 01/27/23 0629    insulin aspart U-100 pen 1-10 Units  1-10 Units Subcutaneous QID (AC + HS) PRN Asad Valle MD        [MAR Hold - Suspended Admission] LIDOcaine 5 % patch 1 patch  1 patch Transdermal Daily PRN Bal Burr MD        LIDOcaine 5 % patch 1 patch  1 patch Transdermal Daily PRN Asad Valle MD        [MAR Hold - Suspended Admission] loperamide capsule 2 mg  2 mg Oral BID Bal Burr MD   2 mg at 01/27/23 1009    loperamide capsule 2 mg  2 mg Oral BID Asad Valle MD   2 mg at 01/28/23 0826    [MAR Hold - Suspended Admission] meclizine tablet 25 mg  25 mg Oral TID PRN Bal Burr MD        meclizine tablet 25 mg  25 mg Oral TID PRN Asad Valle MD        [MAR Hold - Suspended Admission] melatonin tablet 9 mg  9 mg Oral Nightly PRN Bal Burr MD        melatonin tablet 9 mg  9 mg Oral Nightly PRN Asad Valle MD   9 mg at 01/28/23 0158    [MAR Hold - Suspended Admission] ondansetron injection 4 mg  4 mg  Intravenous Q8H PRN Bal Burr MD        ondansetron injection 4 mg  4 mg Intravenous Q8H PRN Asad Valle MD        [MAR Hold - Suspended Admission] pantoprazole EC tablet 40 mg  40 mg Oral Daily Bal Burr MD   40 mg at 01/27/23 1009    pantoprazole EC tablet 40 mg  40 mg Oral Daily Asad Valle MD   40 mg at 01/28/23 0826    [MAR Hold - Suspended Admission] promethazine 6.25 mg/5 mL syrup 12.5 mg  12.5 mg Oral Q6H PRN Bal Burr MD        [MAR Hold - Suspended Admission] sodium chloride 0.9% bolus 250 mL 250 mL  250 mL Intravenous PRN Bal Burr MD        [MAR Hold - Suspended Admission] sodium chloride 0.9% bolus 250 mL 250 mL  250 mL Intravenous PRN Bal Burr MD        [MAR Hold - Suspended Admission] sodium chloride 0.9% bolus 250 mL 250 mL  250 mL Intravenous PRN Jemima Warren MD        sodium chloride 0.9% bolus 250 mL 250 mL  250 mL Intravenous PRN Vernon Almanza MD        [MAR Hold - Suspended Admission] sodium chloride 0.9% flush 5 mL  5 mL Intravenous PRN Bal Burr MD        sodium chloride 0.9% flush 5 mL  5 mL Intravenous PRN Asad Valle MD         Current Outpatient Medications on File Prior to Visit   Medication Sig Dispense Refill    alpha lipoic acid 600 mg Cap       atorvastatin (LIPITOR) 40 MG tablet TAKE 1 TABLET BY MOUTH ONCE DAILY 90 tablet 3    betahistine HCl (BETAHISTINE, BULK, MISC)       blood sugar diagnostic (TRUE METRIX GLUCOSE TEST STRIP) Strp USE ONE STRIP FOR TESTING 2 TIMES A  each 11    blood-glucose meter kit Use to test twice a day 1 each 0    calcium carbonate/vitamin D3 (CALTRATE WITH VITAMIN D3 ORAL) Take by mouth.      cetirizine HCl (ZYRTEC ORAL)       cinnamon bark (CINNAMON ORAL) Take by mouth.      coenzyme Q10 100 mg capsule       cyanocobalamin 1,000 mcg/mL injection Inject 1 mL (1,000 mcg total) into the muscle every 30 days. 10 mL 3    dicyclomine (BENTYL) 10 MG capsule       DULoxetine (CYMBALTA) 30 MG capsule  Take 1 capsule (30 mg total) by mouth 2 (two) times daily. 60 capsule 11    econazole nitrate 1 % cream Apply topically 2 (two) times daily. 30 g 2    finasteride (PROSCAR) 5 mg tablet       flaxseed oil 1,000 mg Cap once a day      gluc-condr-om3-dha-epa-fish-st 007-418-42-54 mg Cap once a day      glucosamine HCl 1,500 mg Tab Take 1,500 mg by mouth.      KRILL OIL ORAL Take by mouth.      lancets Misc 1 lancet by Misc.(Non-Drug; Combo Route) route once daily. 200 each 3    meclizine (ANTIVERT) 25 mg tablet Take 1 tablet (25 mg total) by mouth 3 (three) times daily as needed for Dizziness. 20 tablet 0    metFORMIN (GLUCOPHAGE) 500 MG tablet Take 1 tablet (500 mg total) by mouth 3 (three) times daily. 2 po qam, 1 po qhs. (Patient taking differently: Take 500 mg by mouth 2 (two) times daily. 1 po qam, 1 po qhs.) 270 tablet 3    MILK THISTLE ORAL Take by mouth.      pantoprazole (PROTONIX) 40 MG tablet TAKE 1 TABLET BY MOUTH ONCE DAILY  90 tablet 3    pregabalin (LYRICA) 150 MG capsule Take 1 capsule (150 mg total) by mouth 2 (two) times daily. 60 capsule 0    temazepam (RESTORIL) 30 mg capsule TAKE ONE CAPSULE BY MOUTH NIGHTLY AS NEEDED FOR INSOMNIA 30 capsule 2    triamcinolone acetonide 0.1% (KENALOG) 0.1 % ointment Apply topically 2 (two) times daily as needed (rash). 80 g 11    triamterene-hydrochlorothiazide 37.5-25 mg (MAXZIDE-25) 37.5-25 mg per tablet       vit C,X-Wa-rmits-lutein-zeaxan (PRESERVISION AREDS 2) 905-267-60-1 mg-unit-mg-mg Cap       vitamins  A,C,E-zinc-copper [id number] unit-mg-unit Cap Take 1 tablet by mouth once daily.         Past Surgical History:   Procedure Laterality Date    APPENDECTOMY      BREAST SURGERY      Tags    CARPAL TUNNEL RELEASE Bilateral 09/2017    ,     CLOSURE, COLOSTOMY Left 1/16/2023    Procedure: CLOSURE, COLOSTOMY;  Surgeon: Manuel Javier MD;  Location: [facility];  Service: General;  Laterality: Left;    COLONOSCOPY  04/2018     Normal  ([place])     COLOSTOMY Right 1/16/2023    Procedure: CREATION, COLOSTOMY;  Surgeon: Manuel Javier MD;  Location: [facility] OR;  Service: General;  Laterality: Right;    DILATION AND CURETTAGE OF UTERUS  1986    DUPUYTREN CONTRACTURE RELEASE Bilateral 09/2017    HYSTERECTOMY  1987    BEAU w/ appy, no BSO     INJECTION OF NEUROLYTIC AGENT AROUND LUMBAR SYMPATHETIC NERVE N/A 1/6/2022    Procedure: BLOCK, LUMBAR SYMPATHETIC;  Surgeon: Souleymane Rizo Jr., MD;  Location: [facility];  Service: Pain Management;  Laterality: N/A;  no pacemaker   pt is diabetic     INJECTION OF NEUROLYTIC AGENT AROUND LUMBAR SYMPATHETIC NERVE N/A 8/25/2022    Procedure: BLOCK, LUMBAR SYMPATHETIC;  Surgeon: Souleymane Rizo Jr., MD;  Location: [facility];  Service: Pain Management;  Laterality: N/A;  diabetic     INSERTION OF TUNNELED CENTRAL VENOUS HEMODIALYSIS CATHETER Right 1/25/2023    Procedure: INSERTION, CATHETER, HEMODIALYSIS, DUAL LUMEN;  Surgeon: Manuel Javier MD;  Location: [facility] OR;  Service: General;  Laterality: Right;    LAPAROTOMY, EXPLORATORY N/A 1/14/2023    Procedure: LAPAROTOMY, EXPLORATORY;  Surgeon: Manuel Javier MD;  Location: [facility] OR;  Service: General;  Laterality: N/A;    LAPAROTOMY, EXPLORATORY N/A 1/16/2023    Procedure: LAPAROTOMY, EXPLORATORY;  Surgeon: Manuel Javier MD;  Location: [facility] OR;  Service: General;  Laterality: N/A;    REMOVAL, TUNNELED CATH Right 1/25/2023    Procedure: REMOVAL, TUNNELED CATH;  Surgeon: Manuel Javier MD;  Location: [facility] OR;  Service: General;  Laterality: Right;    SHOULDER SURGERY  2009 & 2010    right rotator cuff    TONSILLECTOMY      UPPER GASTROINTESTINAL ENDOSCOPY  04/2018       Social History     Socioeconomic History    Marital status:    Tobacco Use    Smoking status: Never    Smokeless tobacco: Never   Substance and Sexual Activity    Alcohol use: No    Drug use: Never    Sexual activity: Not Currently     Partners: Male      Birth control/protection: See Surgical Hx     Comment: :      Social Determinants of Health     Financial Resource Strain: Low Risk     Difficulty of Paying Living Expenses: Not hard at all   Food Insecurity: No Food Insecurity    Worried About Running Out of Food in the Last Year: Never true    Ran Out of Food in the Last Year: Never true   Transportation Needs: No Transportation Needs    Lack of Transportation (Medical): No    Lack of Transportation (Non-Medical): No   Physical Activity: Inactive    Days of Exercise per Week: 7 days    Minutes of Exercise per Session: 0 min   Stress: Stress Concern Present    Feeling of Stress : Very much   Social Connections: Unknown    Frequency of Communication with Friends and Family: More than three times a week    Frequency of Social Gatherings with Friends and Family: More than three times a week    Active Member of Clubs or Organizations: Yes    Attends Club or Organization Meetings: More than 4 times per year    Marital Status:    Housing Stability: Low Risk     Unable to Pay for Housing in the Last Year: No    Number of Places Lived in the Last Year: 1    Unstable Housing in the Last Year: No       OBJECTIVE:     Vital Signs Range (Last 24H):  Temp:  [36.2 °C (97.1 °F)-36.8 °C (98.2 °F)]   Pulse:  []   Resp:  [16-20]   BP: (127-172)/(61-87)   SpO2:  [96 %-100 %]       CBC:   Recent Labs     01/27/23 2213 01/28/23 0425   WBC 18.94* 19.02*   RBC 2.45* 2.54*   HGB 6.8* 7.0*   HCT 20.1* 21.2*    373   MCV 82 84   MCH 27.8 27.6   MCHC 33.8 33.0       CMP:   Recent Labs     01/26/23  0636 01/27/23 2213 01/28/23  0425   * 128* 127*   K 4.8 4.8 4.7    99 97   CO2 16* 18* 17*   BUN 67* 81* 85*   CREATININE 5.6* 5.8* 6.1*   GLU 97 218* 150*   MG 2.2 2.0  --    PHOS 5.3* 4.0  --    CALCIUM 8.4* 8.4* 8.2*   ALBUMIN 1.8* 1.8* 1.8*   PROT 6.4 6.5 6.4   ALKPHOS 90 83 84   * 100* 95*   AST 34 24 23   BILITOT 0.4 0.3  0.3       INR:  No results for input(s): PT, INR, PROTIME, APTT in the last 72 hours.    Diagnostic Studies: No relevant studies.    EKG: No recent studies available.    2D ECHO:  No results found for this or any previous visit.      ASSESSMENT/PLAN:           Pre-op Assessment    I have reviewed the Patient Summary Reports.     I have reviewed the Nursing Notes. I have reviewed the NPO Status.   I have reviewed the Medications.     Review of Systems  Anesthesia Hx:  No problems with previous Anesthesia  History of prior surgery of interest to airway management or planning: Denies Family Hx of Anesthesia complications.   Denies Personal Hx of Anesthesia complications.   Hematology/Oncology:         -- Anemia:   Cardiovascular:   Hypertension Denies Dysrhythmias.   Denies Angina.  Denies EDEN.    Pulmonary:   Denies COPD.  Denies Asthma.  Denies Recent URI.    Renal/:   Chronic Renal Disease, Dialysis, CKD Pending hemodialysis for today    Hepatic/GI:   PUD, GERD S/p ex lap on 1/14 for GIB, with recurrent bleed and another ex lap 1/16   Musculoskeletal:   Arthritis   Spine Disorders: Disc disease and Degenerative disease    Neurological:   Denies CVA. Denies Seizures.   Peripheral Neuropathy    Endocrine:   Diabetes, type 2    Psych:   anxiety depression          Physical Exam  General: Cooperative, Alert and Confusion  Oriented to self only     Airway:  Mallampati: II   Mouth Opening: Normal  TM Distance: Normal  Tongue: Normal  Neck ROM: Normal ROM    Dental:  Intact        Anesthesia Plan  Type of Anesthesia, risks & benefits discussed:    Anesthesia Type: Gen Natural Airway, MAC  Intra-op Monitoring Plan: Standard ASA Monitors  Post Op Pain Control Plan: multimodal analgesia and IV/PO Opioids PRN  Induction:  IV  Informed Consent: Informed consent signed with the Patient representative and all parties understand the risks and agree with anesthesia plan.  All questions answered.   ASA Score: 3  Day of Surgery  Review of History & Physical: H&P Update referred to the surgeon/provider.    Ready For Surgery From Anesthesia Perspective.     .

## 2023-01-28 NOTE — HPI
Patient is a 71 y/o female with PMHx of diabetes type 2 (treated with metformin), HTN, and celiac disease (following a strict diet), Acute kidney failure on HD, ischemic colitis s/p total cholectomy who represented to [facility] one day after discharge due to issues with her tunneeled HD catherter while in LTAC.  On previous admission patient was noted to have severe peritonitis secondary to ischemic bowel necrosis.  Underwent total colectomy.  Subsequently developed brisk postop bleed with severe blood loss.  Bleeding was stopped with subsequent ex lap however patient developed worsening ATN and was initiated on CRRT at the time.  When patient was hemodynamically stable, received tunnel cath placement on 01/25/2023.  Initial HD run using tunneled catheter was stopped early due to blockage.  It was suspected that time to be from vasospasm.  Subsequent HD was performed successfully on 01/26/2023 and patient was discharged to LT to continue Monday Wednesday Friday dialysis, PTOT and to finish 14 day course of antibiotics.  While at [facility], patient was clinically stable however when dialysis was attempted on Friday 01/27/2023, line was noted to be blocked and clotted.  Nephrology at [facility] recommended patient to return to [facility] for line revision.    Upon arrival to the ED, the patient appears clinically stable but chronically ill.  Mentation and clinical status at baseline per family in the room consistent with patient's status at discharge from hospital 1 day prior.  Labs drawn in ED did not reveal emergent need for dialysis.  Electrolytes and kidney function appears stable from previous labs.  However, patient's hemoglobin noted to be 6.8 down from 8.5 yesterday.  Patient has no obvious sources of bleeding at this time.  Is hemodynamically stable.  Patient was typed and screened and consented however no blood products were given until dialysis access could be established.  Nephrology was consulted and recommended to fix  access in the morning as patient does not have emergent need for dialysis at this time.  Patient was admitted to [facility] for replacement of tunneled dialysis line and asymptomatic anemia.

## 2023-01-28 NOTE — CONSULTS
[facility] Consult Note    Reason for Consult:     HINA evaluation and management.    Assessment and Plan:     Lily Green is a 72 y.o. 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 Following her surgery her UOP severely diminished and essentially became anuric with only 50ml UOP. Pt was initiated on HD 1/14, she then developed hemorraghic shock and required CRRT, then was transitioned back to iHD. She had issues w/ her line upon arrival to LTAC, so she didn't receive any HD since discharge, now back to address her tunneled cath blockade (previously was believed to be 2/2 vasospasm);     Stage III Anuric HINA  - baseline creatinine 0.8  - Pt with ischemic colitis s/p colectomy 1/14 and another ischemic event 1/16 with hemorrhagic shock.  - ATN with 2 separate insults will likely take several weeks to months to recover  - HD initiated 1/14  - Re-admitted 01/27 w/ mal functional HD catheter  - remains anuric  - Will attempt to dialyze pt today, once her catheter issue has been addressed, recommend obtaining a surgical consult for re-evaluation  - May resume back on her previous MWF HD schedule  - renally dose all medications      Anion Gap Metabolic Acidosis  - bicarb 17 today and expect to improve with dialysis      Hyponatremia  - Most consistent with hypervolemic hyponatremia in the setting of pt's current oliguric failure, Na 127 today, will likely improve w/ HD    Hypocalcemia  - Calcium corrects for low albumin    Acute on chronic Anemia  - Hb ~ 6.8 on admission -> 7.0  - Obtain Iron profile and Ferritin  - Suspect Iron deficiency given pt's recent Hx of significant postop bleeding    Access:  - Tunneled Rt IJ cath, placed 01/25, currently non-functioning  - Recommend surgery consult for evaluation    Nutrition:  - Recommend starting renal diet    Subjective:      History of Present  Illness:  Lily Green is a 72 y.o. female w/ PMHx of diabetes type 2 (treated with metformin), HTN, and celiac disease (following a strict diet), Acute kidney failure on HD, ischemic colitis s/p total cholectomy who represented to [facility] 2023 one day after discharge due to issues with her tunneeled HD catherter while in LTAC.  Subsequently developed brisk postop bleed with severe blood loss.  Bleeding was stopped with subsequent ex lap however patient developed worsening ATN and was initiated on CRRT at the time.  When patient was hemodynamically stable, received tunnel cath placement on 2023.  Initial HD run using tunneled catheter was stopped early due to blockage.  It was suspected that time to be from vasospasm. Subsequent HD was performed successfully on 2023 and patient was discharged to LTAC to continue MWF dialysis, PTOT and to finish 14 day course of antibiotics. While at [facility], patient was clinically stable however when dialysis was attempted on 2023, line was noted to be blocked and clotted.  Nephrology at [facility] recommended patient to return to [facility] for line revision.      Past Medical History:  Past Medical History:   Diagnosis Date    Anxiety     Celiac disease 2018    Celiac disease     Depression     Diabetes mellitus     Family history of breast cancer in mother      at age 68    Hyperlipidemia     Hypertension     Meniere disease     Menopause     Peptic ulcer     Reflux esophagitis     Urinary tract infection     Vaginal infection     Vaginal Pap smear 2014    Pap/Hpv Negative        Past Surgical History:  Past Surgical History:   Procedure Laterality Date    APPENDECTOMY      BREAST SURGERY      Tags    CARPAL TUNNEL RELEASE Bilateral 2017    ,     CLOSURE, COLOSTOMY Left 2023    Procedure: CLOSURE, COLOSTOMY;  Surgeon: Manuel Javier MD;  Location: [facility];  Service: General;  Laterality: Left;    COLONOSCOPY  2018     Normal  ([place] [facility])     COLOSTOMY Right 1/16/2023    Procedure: CREATION, COLOSTOMY;  Surgeon: Manuel Javier MD;  Location: [facility] OR;  Service: General;  Laterality: Right;    DILATION AND CURETTAGE OF UTERUS  1986    DUPUYTREN CONTRACTURE RELEASE Bilateral 09/2017    HYSTERECTOMY  1987    BEAU w/ appy, no BSO     INJECTION OF NEUROLYTIC AGENT AROUND LUMBAR SYMPATHETIC NERVE N/A 1/6/2022    Procedure: BLOCK, LUMBAR SYMPATHETIC;  Surgeon: Souleymane Rizo Jr., MD;  Location: [facility] PAIN MGT;  Service: Pain Management;  Laterality: N/A;  no pacemaker   pt is diabetic     INJECTION OF NEUROLYTIC AGENT AROUND LUMBAR SYMPATHETIC NERVE N/A 8/25/2022    Procedure: BLOCK, LUMBAR SYMPATHETIC;  Surgeon: Souleymane Rizo Jr., MD;  Location: [facility] PAIN MGT;  Service: Pain Management;  Laterality: N/A;  diabetic     INSERTION OF TUNNELED CENTRAL VENOUS HEMODIALYSIS CATHETER Right 1/25/2023    Procedure: INSERTION, CATHETER, HEMODIALYSIS, DUAL LUMEN;  Surgeon: Manuel Javier MD;  Location: [facility] OR;  Service: General;  Laterality: Right;    LAPAROTOMY, EXPLORATORY N/A 1/14/2023    Procedure: LAPAROTOMY, EXPLORATORY;  Surgeon: Manuel Javier MD;  Location: [facility] OR;  Service: General;  Laterality: N/A;    LAPAROTOMY, EXPLORATORY N/A 1/16/2023    Procedure: LAPAROTOMY, EXPLORATORY;  Surgeon: Manuel Javier MD;  Location: [facility] OR;  Service: General;  Laterality: N/A;    REMOVAL, TUNNELED CATH Right 1/25/2023    Procedure: REMOVAL, TUNNELED CATH;  Surgeon: Manuel Javier MD;  Location: [facility] OR;  Service: General;  Laterality: Right;    SHOULDER SURGERY  2009 & 2010    right rotator cuff    TONSILLECTOMY      UPPER GASTROINTESTINAL ENDOSCOPY  04/2018       Allergies:  Review of patient's allergies indicates:   Allergen Reactions    Crestor [rosuvastatin] Swelling    Gluten protein        Medications:   In-Hospital Scheduled Medications:   sodium chloride 0.9%   Intravenous Once    amLODIPine  5 mg Oral Daily    ceFEPime  (MAXIPIME) IVPB  2 g Intravenous Q24H    [START ON 2/26/2023] cyanocobalamin  1,000 mcg Intramuscular Q30 Days    loperamide  2 mg Oral BID    pantoprazole  40 mg Oral Daily      In-Hospital PRN Medications:  sodium chloride 0.9%, acetaminophen, dextrose 10%, dextrose 10%, dextrose 10%, glucagon (human recombinant), heparin (porcine), HYDROcodone-acetaminophen, insulin aspart U-100, LIDOcaine, meclizine, melatonin, ondansetron, sodium chloride 0.9%, sodium chloride 0.9%   In-Hospital IV Infusion Medications:     Home Medications:  Prior to Admission medications    Medication Sig Start Date End Date Taking? Authorizing Provider   alpha lipoic acid 600 mg Cap    Yes Historical Provider   atorvastatin (LIPITOR) 40 MG tablet TAKE 1 TABLET BY MOUTH ONCE DAILY 12/5/22  Yes Pavel Calixto MD   betahistine HCl (BETAHISTINE, BULK, MISC)  5/1/22  Yes Historical Provider   blood sugar diagnostic (TRUE METRIX GLUCOSE TEST STRIP) Strp USE ONE STRIP FOR TESTING 2 TIMES A DAY 1/16/23  Yes Pavel Calixto MD   calcium carbonate/vitamin D3 (CALTRATE WITH VITAMIN D3 ORAL) Take by mouth.   Yes Historical Provider   cetirizine HCl (ZYRTEC ORAL)  4/1/22  Yes Historical Provider   cinnamon bark (CINNAMON ORAL) Take by mouth.   Yes Historical Provider   coenzyme Q10 100 mg capsule    Yes Historical Provider   cyanocobalamin 1,000 mcg/mL injection Inject 1 mL (1,000 mcg total) into the muscle every 30 days. 11/3/22  Yes Pavel Calixto MD   dicyclomine (BENTYL) 10 MG capsule  9/10/20  Yes Historical Provider   DULoxetine (CYMBALTA) 30 MG capsule Take 1 capsule (30 mg total) by mouth 2 (two) times daily. 9/8/22 9/8/23 Yes TRINIDAD Gibson   econazole nitrate 1 % cream Apply topically 2 (two) times daily. 2/7/22  Yes Lucas Bryan DPM   finasteride (PROSCAR) 5 mg tablet  10/3/20  Yes Historical Provider   flaxseed oil 1,000 mg Cap once a day 3/11/13  Yes Historical Provider   gluc-condr-om3-dha-epa-fish-st 084-091-88-54 mg Cap once  a day 3/11/13  Yes Historical Provider   glucosamine HCl 1,500 mg Tab Take 1,500 mg by mouth. 11/30/12  Yes Historical Provider   KRILL OIL ORAL Take by mouth.   Yes Historical Provider   lancets Misc 1 lancet by Misc.(Non-Drug; Combo Route) route once daily. 2/4/22  Yes Pavel Calixto MD   meclizine (ANTIVERT) 25 mg tablet Take 1 tablet (25 mg total) by mouth 3 (three) times daily as needed for Dizziness. 12/7/20  Yes Malick Vo MD   metFORMIN (GLUCOPHAGE) 500 MG tablet Take 1 tablet (500 mg total) by mouth 3 (three) times daily. 2 po qam, 1 po qhs.  Patient taking differently: Take 500 mg by mouth 2 (two) times daily. 1 po qam, 1 po qhs. 3/7/22  Yes Pavel Calixto MD   MILK THISTLE ORAL Take by mouth.   Yes Historical Provider   pantoprazole (PROTONIX) 40 MG tablet TAKE 1 TABLET BY MOUTH ONCE DAILY  6/23/21  Yes Pavel Calixto MD   pregabalin (LYRICA) 150 MG capsule Take 1 capsule (150 mg total) by mouth 2 (two) times daily. 1/5/23 2/4/23 Yes Edgard Dozier MD   temazepam (RESTORIL) 30 mg capsule TAKE ONE CAPSULE BY MOUTH NIGHTLY AS NEEDED FOR INSOMNIA 11/21/22  Yes Pavel Calixto MD   triamcinolone acetonide 0.1% (KENALOG) 0.1 % ointment Apply topically 2 (two) times daily as needed (rash). 4/27/21  Yes Pavel Calixto MD   triamterene-hydrochlorothiazide 37.5-25 mg (MAXZIDE-25) 37.5-25 mg per tablet  10/6/21  Yes Historical Provider   vit C,L-Fw-uuoca-lutein-zeaxan (PRESERVISION AREDS 2) [phone] mg-unit-mg-mg Cap    Yes Historical Provider   vitamins  A,C,E-zinc-copper [phone] unit-mg-unit Cap Take 1 tablet by mouth once daily.   Yes Historical Provider   blood-glucose meter kit Use to test twice a day 6/29/21 6/29/22  Pavel Calixto MD       Family History:  Family History   Problem Relation Age of Onset    Vision loss Father     Arthritis Father     Breast cancer Mother     Cancer Mother     Vision loss Mother     Hyperlipidemia Sister     Breast cancer Paternal Aunt      "Cancer Paternal Aunt     Diabetes Paternal Grandfather     Vision loss Sister     Kidney disease Neg Hx        Social History:  Social History     Tobacco Use    Smoking status: Never    Smokeless tobacco: Never   Substance Use Topics    Alcohol use: No    Drug use: Never       Review of Systems:  Pertinent items are noted in HPI. All other systems are reviewed and are negative.       Objective:   Last 24 Hour Vital Signs:  BP  Min: 127/72  Max: 172/87  Temp  Av.6 °F (36.4 °C)  Min: 97 °F (36.1 °C)  Max: 98.2 °F (36.8 °C)  Pulse  Av.5  Min: 87  Max: 101  Resp  Av.3  Min: 16  Max: 21  SpO2  Av %  Min: 96 %  Max: 100 %  Height  Av' 5" (165.1 cm)  Min: 5' 5" (165.1 cm)  Max: 5' 5" (165.1 cm)  Weight  Av.9 kg (129 lb 13.6 oz)  Min: 58.9 kg (129 lb 13.6 oz)  Max: 58.9 kg (129 lb 13.6 oz)  I/O last 3 completed shifts:  In: 1240 [P.O.:740; Other:500]  Out: 250 [Stool:250]    Physical Examination:  GEN: NAD, AAO x4  HEENT: Normocephalic, atraumatic, PERRLA, EOMI, poor dentition  NECK: no cervical or supraclavicular LAD, no JVD, no thyromegaly.   CV: Regular rhythm, normal rate, S1/S2 heard, no murmurs/rubs/gallops appreciated.   PULM: Symmetrical expansion, clear to auscultation bilaterally, no wheezes or crackles, on rm air  ABD: soft, non-distended, non-tender to palpation, normoactive BS, no organomegaly, no ascites, no rebound tenderness  NEURO: CN II-XII grossly intact bilaterally, fine touch intact distally in all extremities, 5/5 strength in upper and lower extremities  EXT: 2+ radial and dorsalis pedis pulses bilaterally, moves all extremities, no obvious deformities; Good thrill in LUE AVF  MSK: Normal ROM in all extremities bilaterally, no MSK TTP  SKIN: no rashes, normal pigmentation      Laboratory:  Most Recent Data:  CBC:   Lab Results   Component Value Date    WBC 19.02 (H) 2023    HGB 7.0 (L) 2023     2023    MCV 84 2023    RDW 19.3 (H) 2023 "     BMP:   Lab Results   Component Value Date     (L) 01/28/2023    K 4.7 01/28/2023    CL 97 01/28/2023    CO2 17 (L) 01/28/2023     (H) 01/28/2023    BUN 85 (H) 01/28/2023    CREATININE 6.1 (H) 01/28/2023    CALCIUM 8.2 (L) 01/28/2023    MG 2.0 01/27/2023    PHOS 4.0 01/27/2023     LFTs:   Lab Results   Component Value Date    PROT 6.4 01/28/2023    ALBUMIN 1.8 (L) 01/28/2023    AST 23 01/28/2023    ALKPHOS 84 01/28/2023    ALT 95 (H) 01/28/2023     Anemia:   Lab Results   Component Value Date    IRON 102 08/24/2020    TIBC 410 08/24/2020    FERRITIN 28 08/24/2020     Urinalysis:   Lab Results   Component Value Date    LABURIN No growth 10/11/2021    COLORU Yellow 01/15/2023    CLARITYU Clear 12/23/2020    SPECGRAV 1.015 01/15/2023    NITRITE Negative 01/15/2023    KETONESU Negative 01/15/2023    UROBILINOGEN Negative 01/15/2023    WBCUA 60 (H) 07/02/2019       Trended Lab Data:  Recent Labs   Lab 01/26/23  0636 01/27/23  2213 01/28/23  0425   WBC 19.24* 18.94* 19.02*    399 373   MCV 84 82 84   RDW 19.4* 19.0* 19.3*   * 128* 127*   K 4.8 4.8 4.7    99 97   CO2 16* 18* 17*   BUN 67* 81* 85*   CREATININE 5.6* 5.8* 6.1*   GLU 97 218* 150*   CALCIUM 8.4* 8.4* 8.2*   PROT 6.4 6.5 6.4   ALBUMIN 1.8* 1.8* 1.8*   AST 34 24 23   ALKPHOS 90 83 84   * 100* 95*       Other Relevant Results:  Radiology:  CXR 01/27/2023:  Impression:     Slight kink within the distal aspect of the central venous catheter.    Imaging has been reviewed personally.             Thank you for allowing us to participate in the care of this patient. Please contact me if you have any questions regarding this consult.    Vernon Almanza MD  [facility], -[facility]

## 2023-01-28 NOTE — PROGRESS NOTES
[facility]  General Surgery  Progress Note    Subjective:     Interval History:   Went to HD yesterday, right tunneled HD line not working  No complaints this morning  NPO since midnight    Post-Op Info:  Procedure(s) (LRB):  Insertion,catheter,tunneled (N/A)          Medications:  Continuous Infusions:  Scheduled Meds:   sodium chloride 0.9%   Intravenous Once    amLODIPine  5 mg Oral Daily    ceFEPime (MAXIPIME) IVPB  2 g Intravenous Q24H    [START ON 2/26/2023] cyanocobalamin  1,000 mcg Intramuscular Q30 Days    loperamide  2 mg Oral BID    pantoprazole  40 mg Oral Daily     PRN Meds:sodium chloride 0.9%, acetaminophen, dextrose 10%, dextrose 10%, dextrose 10%, glucagon (human recombinant), heparin (porcine), HYDROcodone-acetaminophen, insulin aspart U-100, LIDOcaine, meclizine, melatonin, ondansetron, sodium chloride 0.9%, sodium chloride 0.9%     Objective:     Vital Signs (Most Recent):  Temp: 97.2 °F (36.2 °C) (01/28/23 0812)  Pulse: 84 (01/28/23 0812)  Resp: 18 (01/28/23 0812)  BP: (!) 150/73 (01/28/23 0812)  SpO2: 99 % (01/28/23 0812)   Vital Signs (24h Range):  Temp:  [97.1 °F (36.2 °C)-98.2 °F (36.8 °C)] 97.2 °F (36.2 °C)  Pulse:  [] 84  Resp:  [16-20] 18  SpO2:  [96 %-100 %] 99 %  BP: (127-172)/(61-87) 150/73       Intake/Output Summary (Last 24 hours) at 1/28/2023 0947  Last data filed at 1/28/2023 0623  Gross per 24 hour   Intake 740 ml   Output 250 ml   Net 490 ml       Physical Exam  Right IJ tunneled line unable to flush at all    Significant Labs:  CBC:   Recent Labs   Lab 01/28/23  0425   WBC 19.02*   RBC 2.54*   HGB 7.0*   HCT 21.2*      MCV 84   MCH 27.6   MCHC 33.0     CMP:   Recent Labs   Lab 01/28/23  0425   *   CALCIUM 8.2*   ALBUMIN 1.8*   PROT 6.4   *   K 4.7   CO2 17*   CL 97   BUN 85*   CREATININE 6.1*   ALKPHOS 84   ALT 95*   AST 23   BILITOT 0.3       Significant Diagnostics:  None    Assessment/Plan:     Active Diagnoses:    Diagnosis Date Noted POA     PRINCIPAL PROBLEM:  ATN (acute tubular necrosis) [N17.0] 01/15/2023 Yes    Primary hypertension [I10] 01/24/2023 Yes    Hyponatremia [E87.1] 01/22/2023 Yes    Large bowel ischemia [K55.9] 01/15/2023 Yes    Idiopathic neuropathy [G60.9] 11/19/2021 Yes    Type 2 diabetes mellitus with diabetic polyneuropathy, without long-term current use of insulin [E11.42] 10/17/2019 Yes      Problems Resolved During this Admission:     72F in need of HD  Plans for HD today  NPO already, will plan for removal, placement on left today in OR?  Kink noted on XR report is the shape of the catheter, unrelated to malfunction      Watson Fontenot MD  General Surgery  [facility] - Telemetry

## 2023-01-28 NOTE — OP NOTE
DATE OF PROCEDURE:  01/28/2023    PREOPERATIVE DIAGNOSES:   renal failure.    POSTOPERATIVE DIAGNOSES:  renal failure.    PROCEDURE:  Insertion of left internal jugular vein 28cm permanent dialysis   Catheter using ultrasound and flouroscopic guidance.  Removal of right internal jugular tunneled dialysis catheter    SURGEON:  Watson Fontenot M.D.    ASSISTANT:  None.    ANESTHESIA:  MAC.    PREP:  Chlorhexidine.    IMPLANT: Angiodynamics 16F 28cm double lumen permanent dialysis catheter    SPECIMEN:  None.    ESTIMATED BLOOD LOSS:  Minimal.    INDICATIONS:  The patient is an 72f who was found to have an imminent   need for hemodialysis and a malfunctioning right IJ tunneled line.  The patient was agreeable to the initiation of hemodialysis.    The risks of the procedure were described to the patient including bleeding,   infection, pain, scarring, wound complications, potential injury to structures   in the neck or chest warranting more extensive surgery and potential need for   further interventions.  The patient demonstrated understanding of these risks   and a consent form was obtained.    PROCEDURE IN DETAIL:  The patient was identified in the Preoperative Unit and   taken back to the Operating Room and laid supine on the operating room table.    IV antibiotics were administered prior to the administration of the anesthesia.    MAC anesthesia was administered without complication.  The patient was then   prepped and draped in a standard sterile fashion.  Timeout procedure was   performed in accordance with hospital protocol.  An ultrasound was used to   identify the left internal jugular vein.  The images were interpreted by me   and stored for further review.  The internal jugular vein was accessed using a   needle. The wire was passed without difficulty, we confirmed appropriate positioning  Using fluoroscopic guidance.  An incision in the right upper chest was   made approximately 0.5 cm using a #15 blade  scalpel.  The tunneler was passed   from this incision towards the neck incision without any issue.  Once the   PermCath cuff was in the appropriate position at the chest site, the initial   dilator was passed over the wire and confirmed with fluoroscopic guidance.  The   internal jugular vein was then sequentially dilated and once the final dilator   was passed, the peel-away sheath was then inserted over the wire using Seldinger   technique.  The wire and dilator were removed.  The catheter was then placed   into the peel-away sheath and peel-away sheath was removed while the catheter   was threaded forward.  Once this was complete, final fluoroscopic image did   confirm that the catheter was in appropriate position in the distal SVC right   atrial junction.      Initially the ports would not aspirate well. The catheter was repositioned deeper. After this both ports of the catheter withdrew and flushed very easily.    Each port was then locked with 2.5 mL of 1000 units per mL IV heparin.  The neck   incision was closed using 5-0 Monocryl suture in an interrupted fashion.  The   chest incision was closed using 5-0 Monocryl suture in an interrupted fashion.    The catheter was fixed to the right chest wall using 3-0 nylon suture.  Dry   sterile dressings were then applied.      Next the right IJ line sutures were cut. The catheter was gently pulled out and pressure held.     The patient tolerated the procedure well   and there were no complications.  He was awakened from anesthesia and returned   to the Postoperative Recovery Unit in stable condition.  At the end of the case,   hemostasis was confirmed and sponge, instrument and needle counts were correct   on 2 occasions.  I was present and scrubbed throughout the entirety of the case.    During all vein manipulation, the patient was in the Trendelenburg position.    COMPLICATIONS:  None.    CONDITION:  Stable.

## 2023-01-28 NOTE — PROGRESS NOTES
01/28/23 0114   Admission   Initial VN Admission Questions Complete   Shift   Virtual Nurse - Patient Verbalized Approval Of Camera Use;VN Rounding   Safety/Activity   Patient Rounds bed in low position;call light in patient/parent reach;clutter free environment maintained;visualized patient;placement of personal items at bedside   Safety Promotion/Fall Prevention assistive device/personal item within reach;bed alarm set;Fall Risk reviewed with patient/family;side rails raised x 2   Positioning   Body Position supine   Head of Bed (HOB) Positioning HOB at 30-45 degrees   Pain/Comfort/Sleep   Comfort/Acceptable Pain Level 5   VN cued in to pt's room with permission. Pt's  at bedside to assist with answering of admission questions. Admission questions completed. Plan of care reviewed with pt and . Denies any need at this time. Call bell w/in reach. Instructed to call for needs/assist.

## 2023-01-28 NOTE — H&P
[facility]  History & Physical    Patient Name: Lily Green  MRN: [id number]  Patient Class: OP- Observation  Admission Date: 1/27/2023  Attending Physician: Kavita Baez MD   Primary Care Provider: Pavel Calixto MD         Patient information was obtained from patient, spouse/SO and ER records.     Subjective:     Principal Problem:ATN (acute tubular necrosis)    Chief Complaint:   Chief Complaint   Patient presents with    Vascular Access Problem     Sent to ED from [facility] extended care. Pt was unable to have hemodialysis today.         HPI: Patient is a 73 y/o female with PMHx of diabetes type 2 (treated with metformin), HTN, and celiac disease (following a strict diet), Acute kidney failure on HD, ischemic colitis s/p total cholectomy who represented to [facility] one day after discharge due to issues with her tunneeled HD catherter while in LTAC.  On previous admission patient was noted to have severe peritonitis secondary to ischemic bowel necrosis.  Underwent total colectomy.  Subsequently developed brisk postop bleed with severe blood loss.  Bleeding was stopped with subsequent ex lap however patient developed worsening ATN and was initiated on CRRT at the time.  When patient was hemodynamically stable, received tunnel cath placement on 01/25/2023.  Initial HD run using tunneled catheter was stopped early due to blockage.  It was suspected that time to be from vasospasm.  Subsequent HD was performed successfully on 01/26/2023 and patient was discharged to [facility] to continue Monday Wednesday Friday dialysis, PTOT and to finish 14 day course of antibiotics.  While at [facility], patient was clinically stable however when dialysis was attempted on Friday 01/27/2023, line was noted to be blocked and clotted.  Nephrology at [facility] recommended patient to return to [facility] for line revision.    Upon arrival to the ED, the patient appears clinically stable but chronically ill.   Mentation and clinical status at baseline per family in the room consistent with patient's status at discharge from hospital 1 day prior.  Labs drawn in ED did not reveal emergent need for dialysis.  Electrolytes and kidney function appears stable from previous labs.  However, patient's hemoglobin noted to be 6.8 down from 8.5 yesterday.  Patient has no obvious sources of bleeding at this time.  Is hemodynamically stable.  Patient was typed and screened and consented however no blood products were given until dialysis access could be established.  Nephrology was consulted and recommended to fix access in the morning as patient does not have emergent need for dialysis at this time.  [facility] for replacement of tunneled dialysis line and asymptomatic anemia.      Past Medical History:   Diagnosis Date    Anxiety     Celiac disease 2018    Celiac disease     Depression     Diabetes mellitus     Family history of breast cancer in mother      at age 68    Hyperlipidemia     Hypertension     Meniere disease     Menopause     Peptic ulcer     Reflux esophagitis     Urinary tract infection     Vaginal infection     Vaginal Pap smear 2014    Pap/Hpv Negative        Past Surgical History:   Procedure Laterality Date    APPENDECTOMY      BREAST SURGERY      Tags    CARPAL TUNNEL RELEASE Bilateral 2017    ,     CLOSURE, COLOSTOMY Left 2023    Procedure: CLOSURE, COLOSTOMY;  Surgeon: Manuel Javier MD;  Location: [facility] OR;  Service: General;  Laterality: Left;    COLONOSCOPY  2018    Normal  (Cornell Velazco)     COLOSTOMY Right 2023    Procedure: CREATION, COLOSTOMY;  Surgeon: Manuel Javier MD;  Location: [facility] OR;  Service: General;  Laterality: Right;    DILATION AND CURETTAGE OF UTERUS      DUPUYTREN CONTRACTURE RELEASE Bilateral 2017    HYSTERECTOMY  1987    BEAU w/ nilo, no BSO     INJECTION OF NEUROLYTIC AGENT AROUND  LUMBAR SYMPATHETIC NERVE N/A 1/6/2022    Procedure: BLOCK, LUMBAR SYMPATHETIC;  Surgeon: Souleymane Rizo Jr., MD;  Location: [facility] PAIN MGT;  Service: Pain Management;  Laterality: N/A;  no pacemaker   pt is diabetic     INJECTION OF NEUROLYTIC AGENT AROUND LUMBAR SYMPATHETIC NERVE N/A 8/25/2022    Procedure: BLOCK, LUMBAR SYMPATHETIC;  Surgeon: Souleymane Rizo Jr., MD;  Location: [facility] PAIN [facility]GT;  Service: Pain Management;  Laterality: N/A;  diabetic     INSERTION OF TUNNELED CENTRAL VENOUS HEMODIALYSIS CATHETER Right 1/25/2023    Procedure: INSERTION, CATHETER, HEMODIALYSIS, DUAL LUMEN;  Surgeon: Manuel Javier MD;  Location: [facility] OR;  Service: General;  Laterality: Right;    LAPAROTOMY, EXPLORATORY N/A 1/14/2023    Procedure: LAPAROTOMY, EXPLORATORY;  Surgeon: Manuel Javier MD;  Location: [facility] OR;  Service: General;  Laterality: N/A;    LAPAROTOMY, EXPLORATORY N/A 1/16/2023    Procedure: LAPAROTOMY, EXPLORATORY;  Surgeon: Manuel Javier MD;  Location: [facility] OR;  Service: General;  Laterality: N/A;    REMOVAL, TUNNELED CATH Right 1/25/2023    Procedure: REMOVAL, TUNNELED CATH;  Surgeon: Manuel Javier MD;  Location: [facility] OR;  Service: General;  Laterality: Right;    SHOULDER SURGERY  2009 & 2010    right rotator cuff    TONSILLECTOMY      UPPER GASTROINTESTINAL ENDOSCOPY  04/2018       Review of patient's allergies indicates:   Allergen Reactions    Crestor [rosuvastatin] Swelling    Gluten protein        Current Facility-Administered Medications on File Prior to Encounter   Medication    [MAR Hold - Suspended Admission] 0.9%  NaCl infusion    [MAR Hold - Suspended Admission] 0.9%  NaCl infusion    [MAR Hold - Suspended Admission] amLODIPine tablet 5 mg    [MAR Hold - Suspended Admission] cefepime in dextrose 5 % IVPB 2 g    [MAR Hold - Suspended Admission] cyanocobalamin injection 1,000 mcg    [MAR Hold - Suspended Admission] dextrose 10% bolus 125 mL 125 mL    [MAR Hold - Suspended  Admission] dextrose 10% bolus 125 mL 125 mL    [MAR Hold - Suspended Admission] dextrose 10% bolus 250 mL 250 mL    [MAR Hold - Suspended Admission] glucagon (human recombinant) injection 1 mg    [MAR Hold - Suspended Admission] glucose chewable tablet 16 g    [MAR Hold - Suspended Admission] glucose chewable tablet 24 g    [MAR Hold - Suspended Admission] heparin (porcine) injection 2,400 Units    [MAR Hold - Suspended Admission] HYDROcodone-acetaminophen 5-325 mg per tablet 1 tablet    [MAR Hold - Suspended Admission] influenza 65up-adj (QUADRIVALENT ADJUVANTED PF) vaccine 0.5 mL    [MAR Hold - Suspended Admission] insulin aspart U-100 pen 1-10 Units    [MAR Hold - Suspended Admission] LIDOcaine 5 % patch 1 patch    [MAR Hold - Suspended Admission] loperamide capsule 2 mg    [MAR Hold - Suspended Admission] meclizine tablet 25 mg    [MAR Hold - Suspended Admission] melatonin tablet 9 mg    [MAR Hold - Suspended Admission] metronidazole IVPB 500 mg    [MAR Hold - Suspended Admission] ondansetron injection 4 mg    [MAR Hold - Suspended Admission] pantoprazole EC tablet 40 mg    [MAR Hold - Suspended Admission] promethazine 6.25 mg/5 mL syrup 12.5 mg    [MAR Hold - Suspended Admission] sodium chloride 0.9% bolus 250 mL 250 mL    [MAR Hold - Suspended Admission] sodium chloride 0.9% bolus 250 mL 250 mL    [MAR Hold - Suspended Admission] sodium chloride 0.9% bolus 250 mL 250 mL    [MAR Hold - Suspended Admission] sodium chloride 0.9% flush 5 mL     Current Outpatient Medications on File Prior to Encounter   Medication Sig    alpha lipoic acid 600 mg Cap     atorvastatin (LIPITOR) 40 MG tablet TAKE 1 TABLET BY MOUTH ONCE DAILY    betahistine HCl (BETAHISTINE, BULK, MISC)     blood sugar diagnostic (TRUE METRIX GLUCOSE TEST STRIP) Strp USE ONE STRIP FOR TESTING 2 TIMES A DAY    blood-glucose meter kit Use to test twice a day    calcium carbonate/vitamin D3 (CALTRATE WITH VITAMIN D3 ORAL) Take  by mouth.    cetirizine HCl (ZYRTEC ORAL)     cinnamon bark (CINNAMON ORAL) Take by mouth.    coenzyme Q10 100 mg capsule     cyanocobalamin 1,000 mcg/mL injection Inject 1 mL (1,000 mcg total) into the muscle every 30 days.    dicyclomine (BENTYL) 10 MG capsule     DULoxetine (CYMBALTA) 30 MG capsule Take 1 capsule (30 mg total) by mouth 2 (two) times daily.    econazole nitrate 1 % cream Apply topically 2 (two) times daily.    finasteride (PROSCAR) 5 mg tablet     flaxseed oil 1,000 mg Cap once a day    gluc-condr-om3-dha-epa-fish-st [phone] mg Cap once a day    glucosamine HCl 1,500 mg Tab Take 1,500 mg by mouth.    KRILL OIL ORAL Take by mouth.    lancets Misc 1 lancet by Misc.(Non-Drug; Combo Route) route once daily.    meclizine (ANTIVERT) 25 mg tablet Take 1 tablet (25 mg total) by mouth 3 (three) times daily as needed for Dizziness.    metFORMIN (GLUCOPHAGE) 500 MG tablet Take 1 tablet (500 mg total) by mouth 3 (three) times daily. 2 po qam, 1 po qhs. (Patient taking differently: Take 500 mg by mouth 2 (two) times daily. 1 po qam, 1 po qhs.)    MILK THISTLE ORAL Take by mouth.    pantoprazole (PROTONIX) 40 MG tablet TAKE 1 TABLET BY MOUTH ONCE DAILY     pregabalin (LYRICA) 150 MG capsule Take 1 capsule (150 mg total) by mouth 2 (two) times daily.    temazepam (RESTORIL) 30 mg capsule TAKE ONE CAPSULE BY MOUTH NIGHTLY AS NEEDED FOR INSOMNIA    triamcinolone acetonide 0.1% (KENALOG) 0.1 % ointment Apply topically 2 (two) times daily as needed (rash).    triamterene-hydrochlorothiazide 37.5-25 mg (MAXZIDE-25) 37.5-25 mg per tablet     vit C,L-Rr-dfxsu-lutein-zeaxan (PRESERVISION AREDS 2) [phone] mg-unit-mg-mg Cap     vitamins  A,C,E-zinc-copper [phone] unit-mg-unit Cap Take 1 tablet by mouth once daily.     Family History       Problem Relation (Age of Onset)    Arthritis Father    Breast cancer Mother, Paternal Aunt    Cancer Mother, Paternal Aunt    Diabetes Paternal  Grandfather    Hyperlipidemia Sister    Vision loss Father, Mother, Sister          Tobacco Use    Smoking status: Never    Smokeless tobacco: Never   Substance and Sexual Activity    Alcohol use: No    Drug use: Never    Sexual activity: Not Currently     Partners: Male     Birth control/protection: See Surgical Hx     Comment: :      Review of Systems   Constitutional:  Positive for fatigue. Negative for fever.   HENT:  Negative for congestion and sore throat.    Eyes:  Negative for visual disturbance.   Respiratory:  Negative for apnea, shortness of breath and wheezing.    Cardiovascular:  Negative for chest pain.   Gastrointestinal:  Positive for abdominal pain and diarrhea. Negative for nausea and vomiting.   Genitourinary:  Negative for dysuria.   Musculoskeletal:  Negative for arthralgias and back pain.   Neurological:  Negative for dizziness and headaches.        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    Objective:     Vital Signs (Most Recent):  Temp: 98.2 °F (36.8 °C) (01/27/23 1758)  Pulse: 88 (01/27/23 2322)  Resp: 16 (01/27/23 2322)  BP: 135/68 (01/27/23 2322)  SpO2: 96 % (01/27/23 2322) Vital Signs (24h Range):  Temp:  [96.5 °F (35.8 °C)-98.2 °F (36.8 °C)] 98.2 °F (36.8 °C)  Pulse:  [] 88  Resp:  [16-21] 16  SpO2:  [96 %-99 %] 96 %  BP: (130-172)/(61-87) 135/68        There is no height or weight on file to calculate BMI.    Physical Exam  HENT:      Head: Normocephalic.      Comments: Contusion over left eye     Right Ear: External ear normal.      Left Ear: External ear normal.      Nose: Nose normal.      Mouth/Throat:      Mouth: Mucous membranes are moist.   Eyes:      Pupils: Pupils are equal, round, and reactive to light.   Cardiovascular:      Rate and Rhythm: Normal rate.      Pulses: Normal pulses.   Pulmonary:      Effort: Pulmonary effort is normal.   Abdominal:      General: Abdomen is flat.      Tenderness: There is  abdominal tenderness.      Comments: Laparotomy and Ostomy sites clear clean and intact  Profuse solid output per ileostomy    Skin:     General: Skin is warm.   Neurological:      Mental Status: She is alert.      Comments: Follows all commands  Limited speech, able to communicate non verbally   Psychiatric:      Comments: Some confusion          CRANIAL NERVES     CN III, IV, VI   Pupils are equal, round, and reactive to light.     Significant Labs: All pertinent labs within the past 24 hours have been reviewed.  CBC:   Recent Labs   Lab 01/26/23  0636 01/27/23  2213   WBC 19.24* 18.94*   HGB 8.5* 6.8*   HCT 26.3* 20.1*    399     CMP:   Recent Labs   Lab 01/26/23  0636 01/27/23  2213   * 128*   K 4.8 4.8    99   CO2 16* 18*   GLU 97 218*   BUN 67* 81*   CREATININE 5.6* 5.8*   CALCIUM 8.4* 8.4*   PROT 6.4 6.5   ALBUMIN 1.8* 1.8*   BILITOT 0.4 0.3   ALKPHOS 90 83   AST 34 24   * 100*   ANIONGAP 15 11       Significant Imaging: I have reviewed all pertinent imaging results/findings within the past 24 hours.    Assessment/Plan:     * ATN (acute tubular necrosis)  Likely 2/2 to shock from prior admission  Tunnel cath placed 1/25/23  Line became blocked at HD on 1/25/23 and 1/27/23    Plan  - Consulted surgery for line evaluation  -Strict I&Os,maintain euvolemia   -Avoid renal toxic meds   -Continue to monitor renal status and urine output   -Nephro consulted. Appreciate recs. Plan to reassess HD access       Primary hypertension  Continue amlodipine      Hyponatremia  Pt remains hyponatremic, potentially 2/2 to volume overload, missed HD    Plan  Follow nephro Recs  Fix HD access to resume HD      Large bowel ischemia  #Gram negative Bacteremia  -CT Abd shows possible hematoma but no signs fo acute infection or abscess   - Gram negative rods reported in Blood culture from 1/13  WBC improved to 43 form 45 1/22/23    Plan  - Continue Cefepime, flagyl for total of 14 day course End date  1/28/23  -  All further cultures on 1/17 w/ NGT  - afebrile  - PT/OT        Idiopathic neuropathy  Holding home lyrica due to kidney function  Plan  Discuss alternative treatment options with Nephro  Patient states cold water foot paths can help      Type 2 diabetes mellitus with diabetic polyneuropathy, without long-term current use of insulin  SSI  Detemir 7U nightly  POCT BG QID with meals      VTE Risk Mitigation (From admission, onward)         Ordered     heparin (porcine) injection 2,400 Units  As needed (PRN)         01/27/23 9537                   Asad Valle MD  Department of Hospital Medicine   [place] [facility]

## 2023-01-28 NOTE — PROGRESS NOTES
[facility]  Progress Note    Patient Name: Lily Green  MRN: [id number]  Patient Class: OP- Observation   Admission Date: 1/27/2023  Length of Stay: 0 days  Attending Physician: Kavita Baez MD  Primary Care Provider: Pavel Calixto MD        Subjective:     Principal Problem:ATN (acute tubular necrosis)        HPI:  Patient is a 71 y/o female with PMHx of diabetes type 2 (treated with metformin), HTN, and celiac disease (following a strict diet), Acute kidney failure on HD, ischemic colitis s/p total cholectomy who represented to [facility] one day after discharge due to issues with her tunneeled HD catherter while in LTAC.  On previous admission patient was noted to have severe peritonitis secondary to ischemic bowel necrosis.  Underwent total colectomy.  Subsequently developed brisk postop bleed with severe blood loss.  Bleeding was stopped with subsequent ex lap however patient developed worsening ATN and was initiated on CRRT at the time.  When patient was hemodynamically stable, received tunnel cath placement on 01/25/2023.  Initial HD run using tunneled catheter was stopped early due to blockage.  It was suspected that time to be from vasospasm.  Subsequent HD was performed successfully on 01/26/2023 and patient was discharged to [facility] to continue Monday Wednesday Friday dialysis, PTOT and to finish 14 day course of antibiotics.  While at [facility], patient was clinically stable however when dialysis was attempted on Friday 01/27/2023, line was noted to be blocked and clotted.  Nephrology at [facility] recommended patient to return to [facility] for line revision.    Upon arrival to the ED, the patient appears clinically stable but chronically ill.  Mentation and clinical status at baseline per family in the room consistent with patient's status at discharge from hospital 1 day prior.  Labs drawn in ED did not reveal emergent need for dialysis.  Electrolytes and kidney function appears  stable from previous labs.  However, patient's hemoglobin noted to be 6.8 down from 8.5 yesterday.  Patient has no obvious sources of bleeding at this time.  Is hemodynamically stable.  Patient was typed and screened and consented however no blood products were given until dialysis access could be established.  Nephrology was consulted and recommended to fix access in the morning as patient does not have emergent need for dialysis at this time.  Patient was admitted to [facility] family Medicine for replacement of tunneled dialysis line and asymptomatic anemia.      Overview/Hospital Course:  No notes on file    Interval History: NAEON, continues to endorse neuropathic pain in hands and feet, minimally verbal but follows commands and communicates well non verbally     Review of Systems   Constitutional:  Positive for fatigue. Negative for fever.   HENT:  Negative for congestion and sore throat.    Eyes:  Negative for visual disturbance.   Respiratory:  Negative for apnea, shortness of breath and wheezing.    Cardiovascular:  Negative for chest pain.   Gastrointestinal:  Negative for nausea and vomiting.   Genitourinary:  Negative for dysuria.   Musculoskeletal:  Negative for arthralgias and back pain.   Neurological:  Negative for dizziness and headaches.        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    Objective:     Vital Signs (Most Recent):  Temp: 97.1 °F (36.2 °C) (01/28/23 0455)  Pulse: 89 (01/28/23 0455)  Resp: 20 (01/28/23 0455)  BP: 127/72 (01/28/23 0455)  SpO2: 98 % (01/28/23 0455) Vital Signs (24h Range):  Temp:  [97 °F (36.1 °C)-98.2 °F (36.8 °C)] 97.1 °F (36.2 °C)  Pulse:  [] 89  Resp:  [16-21] 20  SpO2:  [96 %-100 %] 98 %  BP: (127-172)/(61-87) 127/72     Weight: 58.9 kg (129 lb 13.6 oz)  Body mass index is 21.61 kg/m².    Intake/Output Summary (Last 24 hours) at 1/28/2023 0621  Last data filed at 1/27/2023 1536  Gross per 24 hour   Intake 1100 ml    Output --   Net 1100 ml      Physical Exam  HENT:      Head: Normocephalic.      Right Ear: External ear normal.      Left Ear: External ear normal.      Nose: Nose normal.      Mouth/Throat:      Mouth: Mucous membranes are moist.   Eyes:      Pupils: Pupils are equal, round, and reactive to light.   Cardiovascular:      Rate and Rhythm: Normal rate.      Pulses: Normal pulses.   Pulmonary:      Effort: Pulmonary effort is normal.   Abdominal:      General: Abdomen is flat.      Tenderness: There is abdominal tenderness.      Comments: Laparotomy and Ostomy sites clear clean and intact  solid output per ileostomy    Skin:     General: Skin is warm.   Neurological:      Mental Status: She is alert.      Comments: Follows all commands  Limited speech, able to communicate non verbally   Psychiatric:      Comments: Some confusion        Significant Labs: All pertinent labs within the past 24 hours have been reviewed.    Significant Imaging: I have reviewed all pertinent imaging results/findings within the past 24 hours.      Assessment/Plan:      * ATN (acute tubular necrosis)  Likely 2/2 to shock from prior admission  Tunnel cath placed 1/25/23  Line became blocked at HD on 1/25/23 and 1/27/23    Plan  - Consulted surgery for line evaluation  -Strict I&Os,maintain euvolemia   -Avoid renal toxic meds   -Continue to monitor renal status and urine output   -Nephro consulted. Appreciate recs. Plan to reassess HD access       Primary hypertension  Continue amlodipine      Hyponatremia  Pt remains hyponatremic, potentially 2/2 to volume overload, missed HD    Plan  Follow nephro Recs  Fix HD access to resume HD      Large bowel ischemia  #Gram negative Bacteremia  -CT Abd shows possible hematoma but no signs fo acute infection or abscess   - Gram negative rods reported in Blood culture from 1/13  WBC improved to 43 form 45 1/22/23    Plan  - Continue Cefepime, flagyl for total of 14 day course End date 1/28/23  -  All  further cultures on 1/17 w/ NGT  - afebrile  - PT/OT        Idiopathic neuropathy  Holding home lyrica due to kidney function  Plan  Discuss alternative treatment options with Nephro  Patient states cold water foot paths can help      Type 2 diabetes mellitus with diabetic polyneuropathy, without long-term current use of insulin  SSI  Detemir 7U nightly  POCT BG QID with meals      VTE Risk Mitigation (From admission, onward)         Ordered     heparin (porcine) injection 2,400 Units  As needed (PRN)         01/27/23 8835                Discharge Planning   CAMPBELL:      Code Status: Full Code   Is the patient medically ready for discharge?:     Reason for patient still in hospital (select all that apply): Treatment           Asad Valle MD  Department of Hospital Medicine   [facility]

## 2023-01-28 NOTE — TRANSFER OF CARE
"Anesthesia Transfer of Care Note    Patient: Lily Green    Procedure(s) Performed: Procedure(s) (LRB):  Insertion,catheter,tunneled (Left)  REMOVAL, CATHETER (Right)    Patient location: Other: pt recovered in pt room    Anesthesia Type: general    Transport from OR: Transported from OR on 2-3 L/min O2 by NC with adequate spontaneous ventilation    Post pain: adequate analgesia    Post assessment: no apparent anesthetic complications and tolerated procedure well    Post vital signs: stable    Level of consciousness: responds to stimulation    Nausea/Vomiting: no nausea/vomiting    Complications: none    Transfer of care protocol was followed      Last vitals:   Visit Vitals  BP (!) 150/73 (BP Location: Left arm, Patient Position: Lying)   Pulse 84   Temp 36.2 °C (97.2 °F) (Oral)   Resp 18   Ht 5' 5" (1.651 m)   Wt 58.9 kg (129 lb 13.6 oz)   LMP  (LMP Unknown)   SpO2 99%   BMI 21.61 kg/m²     "

## 2023-01-28 NOTE — ANESTHESIA POSTPROCEDURE EVALUATION
Anesthesia Post Evaluation    Patient: Lily Green    Procedure(s) Performed: Procedure(s) (LRB):  Insertion,catheter,tunneled (Left)  REMOVAL, CATHETER (Right)    Final Anesthesia Type: general      Patient location during evaluation: PACU  Patient participation: Yes- Able to Participate  Level of consciousness: awake  Post-procedure vital signs: reviewed and stable  Pain management: adequate  Airway patency: patent    PONV status at discharge: No PONV  Anesthetic complications: no      Cardiovascular status: blood pressure returned to baseline and hemodynamically stable  Respiratory status: unassisted, room air and spontaneous ventilation  Hydration status: euvolemic  Follow-up not needed.          Vitals Value Taken Time   /80 01/28/23 1645   Temp 36.7 °C (98.1 °F) 01/28/23 1415   Pulse 100 01/28/23 1645   Resp 17 01/28/23 1415   SpO2 98 % 01/28/23 1215         Event Time   Out of Recovery 01/28/2023 12:07:47         Pain/Meredith Score: Pain Rating Prior to Med Admin: 8 (1/27/2023 10:09 AM)  Pain Rating Post Med Admin: 6 (1/27/2023 11:09 AM)  Meredith Score: 10 (1/28/2023 12:02 PM)

## 2023-01-28 NOTE — NURSING TRANSFER
Nursing Transfer Note      1/28/2023     Reason patient is being transferred: post op    Transfer To: back to room 473    Transfer via bed    Transfer with cardiac monitoring    Transported by rn    Medicines sent: no    Any special needs or follow-up needed: no    Chart send with patient: Yes    Notified: family    Patient reassessed at:  (date, time)    Upon arrival to floor:

## 2023-01-28 NOTE — SUBJECTIVE & OBJECTIVE
Interval History: NAEON, continues to endorse neuropathic pain in hands and feet, minimally verbal but follows commands and communicates well non verbally     Review of Systems   Constitutional:  Positive for fatigue. Negative for fever.   HENT:  Negative for congestion and sore throat.    Eyes:  Negative for visual disturbance.   Respiratory:  Negative for apnea, shortness of breath and wheezing.    Cardiovascular:  Negative for chest pain.   Gastrointestinal:  Negative for nausea and vomiting.   Genitourinary:  Negative for dysuria.   Musculoskeletal:  Negative for arthralgias and back pain.   Neurological:  Negative for dizziness and headaches.        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    Objective:     Vital Signs (Most Recent):  Temp: 97.1 °F (36.2 °C) (01/28/23 0455)  Pulse: 89 (01/28/23 0455)  Resp: 20 (01/28/23 0455)  BP: 127/72 (01/28/23 0455)  SpO2: 98 % (01/28/23 0455) Vital Signs (24h Range):  Temp:  [97 °F (36.1 °C)-98.2 °F (36.8 °C)] 97.1 °F (36.2 °C)  Pulse:  [] 89  Resp:  [16-21] 20  SpO2:  [96 %-100 %] 98 %  BP: (127-172)/(61-87) 127/72     Weight: 58.9 kg (129 lb 13.6 oz)  Body mass index is 21.61 kg/m².    Intake/Output Summary (Last 24 hours) at 1/28/2023 0621  Last data filed at 1/27/2023 1536  Gross per 24 hour   Intake 1100 ml   Output --   Net 1100 ml      Physical Exam  HENT:      Head: Normocephalic.      Right Ear: External ear normal.      Left Ear: External ear normal.      Nose: Nose normal.      Mouth/Throat:      Mouth: Mucous membranes are moist.   Eyes:      Pupils: Pupils are equal, round, and reactive to light.   Cardiovascular:      Rate and Rhythm: Normal rate.      Pulses: Normal pulses.   Pulmonary:      Effort: Pulmonary effort is normal.   Abdominal:      General: Abdomen is flat.      Tenderness: There is abdominal tenderness.      Comments: Laparotomy and Ostomy sites clear clean and intact  solid output per  ileostomy    Skin:     General: Skin is warm.   Neurological:      Mental Status: She is alert.      Comments: Follows all commands  Limited speech, able to communicate non verbally   Psychiatric:      Comments: Some confusion        Significant Labs: All pertinent labs within the past 24 hours have been reviewed.    Significant Imaging: I have reviewed all pertinent imaging results/findings within the past 24 hours.

## 2023-01-29 LAB
ALBUMIN SERPL BCP-MCNC: 1.8 G/DL (ref 3.5–5.2)
ALP SERPL-CCNC: 80 U/L (ref 55–135)
ALT SERPL W/O P-5'-P-CCNC: 68 U/L (ref 10–44)
ANION GAP SERPL CALC-SCNC: 10 MMOL/L (ref 8–16)
AST SERPL-CCNC: 25 U/L (ref 10–40)
BASOPHILS # BLD AUTO: 0.09 K/UL (ref 0–0.2)
BASOPHILS NFR BLD: 0.6 % (ref 0–1.9)
BILIRUB SERPL-MCNC: 0.2 MG/DL (ref 0.1–1)
BUN SERPL-MCNC: 34 MG/DL (ref 8–23)
CALCIUM SERPL-MCNC: 8 MG/DL (ref 8.7–10.5)
CHLORIDE SERPL-SCNC: 97 MMOL/L (ref 95–110)
CO2 SERPL-SCNC: 25 MMOL/L (ref 23–29)
CREAT SERPL-MCNC: 3.6 MG/DL (ref 0.5–1.4)
DIFFERENTIAL METHOD: ABNORMAL
EOSINOPHIL # BLD AUTO: 0.1 K/UL (ref 0–0.5)
EOSINOPHIL NFR BLD: 0.7 % (ref 0–8)
ERYTHROCYTE [DISTWIDTH] IN BLOOD BY AUTOMATED COUNT: 19.1 % (ref 11.5–14.5)
EST. GFR  (NO RACE VARIABLE): 13 ML/MIN/1.73 M^2
GLUCOSE SERPL-MCNC: 235 MG/DL (ref 70–110)
HCT VFR BLD AUTO: 20 % (ref 37–48.5)
HGB BLD-MCNC: 6.7 G/DL (ref 12–16)
IMM GRANULOCYTES # BLD AUTO: 0.53 K/UL (ref 0–0.04)
IMM GRANULOCYTES NFR BLD AUTO: 3.6 % (ref 0–0.5)
LYMPHOCYTES # BLD AUTO: 1.4 K/UL (ref 1–4.8)
LYMPHOCYTES NFR BLD: 9.7 % (ref 18–48)
MCH RBC QN AUTO: 27.8 PG (ref 27–31)
MCHC RBC AUTO-ENTMCNC: 33.5 G/DL (ref 32–36)
MCV RBC AUTO: 83 FL (ref 82–98)
MONOCYTES # BLD AUTO: 2.3 K/UL (ref 0.3–1)
MONOCYTES NFR BLD: 15.2 % (ref 4–15)
NEUTROPHILS # BLD AUTO: 10.4 K/UL (ref 1.8–7.7)
NEUTROPHILS NFR BLD: 70.2 % (ref 38–73)
NRBC BLD-RTO: 0 /100 WBC
PLATELET # BLD AUTO: 330 K/UL (ref 150–450)
PMV BLD AUTO: 10.2 FL (ref 9.2–12.9)
POCT GLUCOSE: 240 MG/DL (ref 70–110)
POCT GLUCOSE: 269 MG/DL (ref 70–110)
POCT GLUCOSE: 286 MG/DL (ref 70–110)
POCT GLUCOSE: 377 MG/DL (ref 70–110)
POTASSIUM SERPL-SCNC: 4.3 MMOL/L (ref 3.5–5.1)
PROT SERPL-MCNC: 6.3 G/DL (ref 6–8.4)
RBC # BLD AUTO: 2.41 M/UL (ref 4–5.4)
SODIUM SERPL-SCNC: 132 MMOL/L (ref 136–145)
WBC # BLD AUTO: 14.85 K/UL (ref 3.9–12.7)

## 2023-01-29 PROCEDURE — 97161 PT EVAL LOW COMPLEX 20 MIN: CPT

## 2023-01-29 PROCEDURE — G0378 HOSPITAL OBSERVATION PER HR: HCPCS

## 2023-01-29 PROCEDURE — 36415 COLL VENOUS BLD VENIPUNCTURE: CPT | Performed by: STUDENT IN AN ORGANIZED HEALTH CARE EDUCATION/TRAINING PROGRAM

## 2023-01-29 PROCEDURE — 80053 COMPREHEN METABOLIC PANEL: CPT | Performed by: STUDENT IN AN ORGANIZED HEALTH CARE EDUCATION/TRAINING PROGRAM

## 2023-01-29 PROCEDURE — 97166 OT EVAL MOD COMPLEX 45 MIN: CPT

## 2023-01-29 PROCEDURE — 25000003 PHARM REV CODE 250: Performed by: STUDENT IN AN ORGANIZED HEALTH CARE EDUCATION/TRAINING PROGRAM

## 2023-01-29 PROCEDURE — 63600175 PHARM REV CODE 636 W HCPCS: Mod: TB,JG | Performed by: STUDENT IN AN ORGANIZED HEALTH CARE EDUCATION/TRAINING PROGRAM

## 2023-01-29 PROCEDURE — 97535 SELF CARE MNGMENT TRAINING: CPT

## 2023-01-29 PROCEDURE — 97530 THERAPEUTIC ACTIVITIES: CPT

## 2023-01-29 PROCEDURE — 96372 THER/PROPH/DIAG INJ SC/IM: CPT | Performed by: STUDENT IN AN ORGANIZED HEALTH CARE EDUCATION/TRAINING PROGRAM

## 2023-01-29 PROCEDURE — 94799 UNLISTED PULMONARY SVC/PX: CPT

## 2023-01-29 PROCEDURE — 85025 COMPLETE CBC W/AUTO DIFF WBC: CPT | Performed by: STUDENT IN AN ORGANIZED HEALTH CARE EDUCATION/TRAINING PROGRAM

## 2023-01-29 RX ORDER — GUAIFENESIN/DEXTROMETHORPHAN 100-10MG/5
5 SYRUP ORAL EVERY 4 HOURS PRN
Status: DISCONTINUED | OUTPATIENT
Start: 2023-01-29 | End: 2023-02-01 | Stop reason: HOSPADM

## 2023-01-29 RX ADMIN — AMLODIPINE BESYLATE 5 MG: 5 TABLET ORAL at 09:01

## 2023-01-29 RX ADMIN — HYDROCODONE BITARTRATE AND ACETAMINOPHEN 1 TABLET: 5; 325 TABLET ORAL at 09:01

## 2023-01-29 RX ADMIN — PANTOPRAZOLE SODIUM 40 MG: 40 TABLET, DELAYED RELEASE ORAL at 09:01

## 2023-01-29 RX ADMIN — Medication 9 MG: at 09:01

## 2023-01-29 RX ADMIN — GUAIFENESIN AND DEXTROMETHORPHAN 5 ML: 100; 10 SYRUP ORAL at 09:01

## 2023-01-29 RX ADMIN — INSULIN ASPART 10 UNITS: 100 INJECTION, SOLUTION INTRAVENOUS; SUBCUTANEOUS at 04:01

## 2023-01-29 RX ADMIN — LOPERAMIDE HYDROCHLORIDE 2 MG: 2 CAPSULE ORAL at 09:01

## 2023-01-29 RX ADMIN — CEFEPIME HYDROCHLORIDE 2 G: 2 INJECTION, SOLUTION INTRAVENOUS at 06:01

## 2023-01-29 RX ADMIN — INSULIN ASPART 3 UNITS: 100 INJECTION, SOLUTION INTRAVENOUS; SUBCUTANEOUS at 09:01

## 2023-01-29 RX ADMIN — INSULIN ASPART 4 UNITS: 100 INJECTION, SOLUTION INTRAVENOUS; SUBCUTANEOUS at 09:01

## 2023-01-29 NOTE — PHARMACY MED REC
"[facility]           Pharmacy  Admission Medication History     The home medication history was taken by Roula Chaidez PharmD.      Medication history obtained from Medications listed below were obtained from: SNF/Rehab/LTAC     Based on information gathered for medication list, you may go to "Admission" then "Reconcile Home Medications" tabs to review and/or act upon those items.     The home medication list has been updated by the Pharmacy department.   Please read ALL comments highlighted in yellow.   Please address this information as you see fit.    Feel free to contact us if you have any questions or require assistance.    The current inpatient medication list has been compared to the home medication list and the following discrepancies were noted:    Patient reports NOT TAKING the following medication(s):    Patient reports he/she IS TAKING the following which was not ordered upon admit    Patient reports taking a DIFFERENT DRUG than that ordered upon admit    Patient reports taking a drug DIFFERENTLY than how ordered upon admit    Home medications being held due to nephrotoxicity: Lipitor, Cymbalta, Lyrica  Maxzide      Potential issues to be addressed PRIOR TO DISCHARGE      No current facility-administered medications on file prior to encounter.     Current Outpatient Medications on File Prior to Encounter   Medication Sig Dispense Refill    alpha lipoic acid 600 mg Cap       atorvastatin (LIPITOR) 40 MG tablet TAKE 1 TABLET BY MOUTH ONCE DAILY 90 tablet 3    betahistine HCl (BETAHISTINE, BULK, MISC)       blood sugar diagnostic (TRUE METRIX GLUCOSE TEST STRIP) Strp USE ONE STRIP FOR TESTING 2 TIMES A  each 11    calcium carbonate/vitamin D3 (CALTRATE WITH VITAMIN D3 ORAL) Take by mouth.      cetirizine HCl (ZYRTEC ORAL)       cinnamon bark (CINNAMON ORAL) Take by mouth.      coenzyme Q10 100 mg capsule       cyanocobalamin 1,000 mcg/mL injection Inject 1 mL (1,000 mcg total) " into the muscle every 30 days. 10 mL 3    dicyclomine (BENTYL) 10 MG capsule       DULoxetine (CYMBALTA) 30 MG capsule Take 1 capsule (30 mg total) by mouth 2 (two) times daily. 60 capsule 11    econazole nitrate 1 % cream Apply topically 2 (two) times daily. 30 g 2    finasteride (PROSCAR) 5 mg tablet       flaxseed oil 1,000 mg Cap once a day      gluc-condr-om3-dha-epa-fish-st 494-186-69-54 mg Cap once a day      glucosamine HCl 1,500 mg Tab Take 1,500 mg by mouth.      KRILL OIL ORAL Take by mouth.      lancets Misc 1 lancet by Misc.(Non-Drug; Combo Route) route once daily. 200 each 3    meclizine (ANTIVERT) 25 mg tablet Take 1 tablet (25 mg total) by mouth 3 (three) times daily as needed for Dizziness. 20 tablet 0    metFORMIN (GLUCOPHAGE) 500 MG tablet Take 1 tablet (500 mg total) by mouth 3 (three) times daily. 2 po qam, 1 po qhs. (Patient taking differently: Take 500 mg by mouth 2 (two) times daily. 1 po qam, 1 po qhs.) 270 tablet 3    MILK THISTLE ORAL Take by mouth.      pantoprazole (PROTONIX) 40 MG tablet TAKE 1 TABLET BY MOUTH ONCE DAILY  90 tablet 3    pregabalin (LYRICA) 150 MG capsule Take 1 capsule (150 mg total) by mouth 2 (two) times daily. 60 capsule 0    temazepam (RESTORIL) 30 mg capsule TAKE ONE CAPSULE BY MOUTH NIGHTLY AS NEEDED FOR INSOMNIA 30 capsule 2    triamcinolone acetonide 0.1% (KENALOG) 0.1 % ointment Apply topically 2 (two) times daily as needed (rash). 80 g 11    triamterene-hydrochlorothiazide 37.5-25 mg (MAXZIDE-25) 37.5-25 mg per tablet       vit C,H-Sv-nvuwq-lutein-zeaxan (PRESERVISION AREDS 2) 641-934-52-1 mg-unit-mg-mg Cap       vitamins  A,C,E-zinc-copper 14,[phone] unit-mg-unit Cap Take 1 tablet by mouth once daily.      blood-glucose meter kit Use to test twice a day 1 each 0       Please address this information as you see fit.  Feel free to contact us if you have any questions or require assistance.    Jessenia MouraD  [phone]                  .

## 2023-01-29 NOTE — SUBJECTIVE & OBJECTIVE
Interval History: NAEON    Review of Systems   Constitutional:  Positive for fatigue. Negative for fever.   HENT:  Negative for congestion and sore throat.    Eyes:  Negative for visual disturbance.   Respiratory:  Negative for apnea, shortness of breath and wheezing.    Cardiovascular:  Negative for chest pain.   Gastrointestinal:  Negative for nausea and vomiting.   Genitourinary:  Negative for dysuria.   Musculoskeletal:  Negative for arthralgias and back pain.   Neurological:  Negative for dizziness and headaches.        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    Objective:     Vital Signs (Most Recent):  Temp: 97.3 °F (36.3 °C) (01/29/23 1215)  Pulse: 96 (01/29/23 1215)  Resp: 18 (01/29/23 1215)  BP: (!) 143/65 (01/29/23 1215)  SpO2: 96 % (01/29/23 1215)   Vital Signs (24h Range):  Temp:  [97.2 °F (36.2 °C)-98.3 °F (36.8 °C)] 97.3 °F (36.3 °C)  Pulse:  [] 96  Resp:  [18-20] 18  SpO2:  [96 %-99 %] 96 %  BP: (123-147)/(65-84) 143/65     Weight: 94.8 kg (208 lb 15.9 oz)  Body mass index is 32.25 kg/m².    Intake/Output Summary (Last 24 hours) at 1/29/2023 1435  Last data filed at 1/28/2023 1930  Gross per 24 hour   Intake 500 ml   Output 2600 ml   Net -2100 ml      Physical Exam  HENT:      Head: Normocephalic.      Right Ear: External ear normal.      Left Ear: External ear normal.      Nose: Nose normal.      Mouth/Throat:      Mouth: Mucous membranes are moist.   Eyes:      Pupils: Pupils are equal, round, and reactive to light.   Cardiovascular:      Rate and Rhythm: Normal rate.      Pulses: Normal pulses.   Pulmonary:      Effort: Pulmonary effort is normal.   Abdominal:      General: Abdomen is flat.      Tenderness: There is abdominal tenderness.      Comments: Laparotomy and Ostomy sites clear clean and intact  solid output per ileostomy    Skin:     General: Skin is warm.   Neurological:      Mental Status: She is alert.      Comments: Follows all  commands  Limited speech, able to communicate non verbally   Psychiatric:      Comments: Some confusion        Significant Labs: All pertinent labs within the past 24 hours have been reviewed.  BMP:   Recent Labs   Lab 01/27/23 2213 01/28/23 0425 01/29/23  0350   *   < > 235*   *   < > 132*   K 4.8   < > 4.3   CL 99   < > 97   CO2 18*   < > 25   BUN 81*   < > 34*   CREATININE 5.8*   < > 3.6*   CALCIUM 8.4*   < > 8.0*   MG 2.0  --   --     < > = values in this interval not displayed.     CBC:   Recent Labs   Lab 01/27/23 2213 01/28/23 0425 01/29/23  0350   WBC 18.94* 19.02* 14.85*   HGB 6.8* 7.0* 6.7*   HCT 20.1* 21.2* 20.0*    373 330     CMP:   Recent Labs   Lab 01/27/23 2213 01/28/23 0425 01/29/23  0350   * 127* 132*   K 4.8 4.7 4.3   CL 99 97 97   CO2 18* 17* 25   * 150* 235*   BUN 81* 85* 34*   CREATININE 5.8* 6.1* 3.6*   CALCIUM 8.4* 8.2* 8.0*   PROT 6.5 6.4 6.3   ALBUMIN 1.8* 1.8* 1.8*   BILITOT 0.3 0.3 0.2   ALKPHOS 83 84 80   AST 24 23 25   * 95* 68*   ANIONGAP 11 13 10       Significant Imaging: I have reviewed all pertinent imaging results/findings within the past 24 hours.

## 2023-01-29 NOTE — PT/OT/SLP EVAL
"Physical Therapy Evaluation and Treatment    Patient Name:  Lily Green   MRN:  [id number]    Recommendations:     Discharge Recommendations:  (post acute placement / therapy)   Discharge Equipment Recommendations:  (TBD)   Barriers to discharge:  increased assist needed, fall risk    Assessment:     Lily Green is a 72 y.o. female admitted with a medical diagnosis of Problem with vascular access.  She presents with the following impairments/functional limitations: weakness, gait instability, impaired balance, impaired endurance, impaired self care skills, impaired functional mobility, decreased safety awareness, decreased lower extremity function, decreased upper extremity function, impaired cardiopulmonary response to activity, pain, edema, impaired skin, decreased coordination, impaired coordination, impaired cognition, impaired sensation . Pt is confused and demonstrating impaired insight into deficits / poor overall awareness. Pt reports "I don't know" when asked questions. Pt requires max cueing and prompting for sequencing tasks and motor planning. Pt requires Dina-modA for bed mobility. Pt able to take ~2-3 side steps L toward HOB with HHA and Dina-ModA. Pt limited by cough and weakness. Pt returned supine and SpO2 97% on 2 L. Recommending post acute placement / therapy.     Rehab Prognosis: Fair; patient would benefit from acute skilled PT services to address these deficits and reach maximum level of function.    Recent Surgery: Procedure(s) (LRB):  Insertion,catheter,tunneled (Left)  REMOVAL, CATHETER (Right) 1 Day Post-Op    Plan:     During this hospitalization, patient to be seen 5 x/week to address the identified rehab impairments via gait training, therapeutic activities, therapeutic exercises, neuromuscular re-education and progress toward the following goals:    Plan of Care Expires:  02/28/23    Subjective     Chief Complaint: pain/hypersensitivity in B feet (chronic " "issue)  Patient/Family Comments/goals:   states he would like for her to return to baseline - she was independent/active and now is very confused  Pain/Comfort:  Pain Rating 1: 0/10 (pain when touched or donning socks)  Location - Side 1: Bilateral  Location - Orientation 1: generalized  Location 1: foot  Pain Addressed 1: Distraction, Cessation of Activity, Nurse notified  Pain Rating Post-Intervention 1: 0/10    Patients cultural, spiritual, Congregational conflicts given the current situation: no    Living Environment:   Pt is , however, both her and her  have their separate homes near by each other -  was staying with her to recuperate from a hip surgery and she was caring for him prior to pt becoming ill  - pt lives in a H with THE entrance - has access to both WIS and t/s combo for bathing; pt was previously hospitalized and discharged to an LTAC  Prior to admission, patients level of function was independent and active with no AD needed prior to becoming ill.  Equipment used at home: bedside commode.  DME owned (not currently used): bedside commode.  Upon discharge, patient will have assistance from spouse but limited physical ability.    Objective:     Communicated with nsg prior to session.  Patient found HOB elevated with telemetry, oxygen, bed alarm, colostomy  upon PT entry to room.    General Precautions: Standard, fall  Orthopedic Precautions:N/A   Braces: N/A  Respiratory Status: Nasal cannula, flow 2 L/min    Exams:  Cognitive Exam:  Patient is oriented to Person; pt responds "I don't know" to 90% of questions; impaired insight into deficits and poor overall awareness  Postural Exam:  Patient presented with the following abnormalities:    -       Rounded shoulders  -       Forward head  Skin Integrity/Edema:      -       Skin integrity: Visible skin intact  -       Edema: Mild B feet  RLE ROM: WFL  RLE Strength: grossly 4/5  LLE ROM: WFL  LLE Strength: grossly " "4/5  Neuropathy and hypersensitivity in B feet     Functional Mobility:  Bed Mobility:     Scooting: minimum assistance  Supine to Sit: minimum assistance, HOB elevated, [place] for use of bed rail; max cueing for sequencing and increased time required  Sit to Supine: moderate assistance  Transfers:     Sit to Stand:  minimum assistance with hand-held assist  Gait: ~2-3 side steps L toward HOB with HHA and Dina-ModA. Pt limited by cough and weakness.       AM-PAC 6 CLICK MOBILITY  Total Score:15       Treatment & Education:  Pt is confused and demonstrating impaired insight into deficits / poor overall awareness.   Pt reports "I don't know" when asked questions.   Pt requires max cueing and prompting for sequencing tasks and motor planning.   Pt requires Dina-modA for bed mobility.   Pt requires SBA for sitting balance and CGA-Dina for static standing balance  Pt able to take ~2-3 side steps L toward HOB with HHA and Dina-ModA.   Pt limited by cough and weakness.   Pt returned supine and SpO2 97% on 2 L.   BLE elevated on pillow with heels floated and feet left uncovered per request due to hypersensitivity     Patient left HOB elevated with all lines intact, call button in reach, bed alarm on, nsg notified, and spouse present.    GOALS:   Multidisciplinary Problems       Physical Therapy Goals          Problem: Physical Therapy    Goal Priority Disciplines Outcome Goal Variances Interventions   Physical Therapy Goal     PT, PT/OT Ongoing, Progressing     Description: Goals to be met by: 23     Patient will increase functional independence with mobility by performin. Supine to sit with Stand-by Assistance  2. Sit to supine with Stand-by Assistance  3. Sit to stand transfer with Stand-by Assistance  4. Bed to chair transfer with Stand-by Assistance using LRAD.  5. Gait  x 25 feet with Contact Guard Assistance using LRAD.                          History:     Past Medical History:   Diagnosis Date    Anxiety  "    Celiac disease 2018    Celiac disease     Depression     Diabetes mellitus     Family history of breast cancer in mother      at age 68    Hyperlipidemia     Hypertension     Meniere disease     Menopause     Peptic ulcer     Reflux esophagitis     Urinary tract infection     Vaginal infection     Vaginal Pap smear 2014    Pap/Hpv Negative        Past Surgical History:   Procedure Laterality Date    APPENDECTOMY      BREAST SURGERY      Tags    CARPAL TUNNEL RELEASE Bilateral 2017    ,     CLOSURE, COLOSTOMY Left 2023    Procedure: CLOSURE, COLOSTOMY;  Surgeon: Manuel Javier MD;  Location: [facility] OR;  Service: General;  Laterality: Left;    COLONOSCOPY  2018    Normal  ( Juan A)     COLOSTOMY Right 2023    Procedure: CREATION, COLOSTOMY;  Surgeon: Manuel Javier MD;  Location: [facility] OR;  Service: General;  Laterality: Right;    DILATION AND CURETTAGE OF UTERUS  1986    DUPUYTREN CONTRACTURE RELEASE Bilateral 2017    HYSTERECTOMY  1987    BEAU w/ appy, no BSO     INJECTION OF NEUROLYTIC AGENT AROUND LUMBAR SYMPATHETIC NERVE N/A 2022    Procedure: BLOCK, LUMBAR SYMPATHETIC;  Surgeon: Souleymane Rizo Jr., MD;  Location: [facility] PAIN MGT;  Service: Pain Management;  Laterality: N/A;  no pacemaker   pt is diabetic     INJECTION OF NEUROLYTIC AGENT AROUND LUMBAR SYMPATHETIC NERVE N/A 2022    Procedure: BLOCK, LUMBAR SYMPATHETIC;  Surgeon: Souleymane Rizo Jr., MD;  Location: [facility] PAIN [facility]GT;  Service: Pain Management;  Laterality: N/A;  diabetic     INSERTION OF TUNNELED CENTRAL VENOUS HEMODIALYSIS CATHETER Right 2023    Procedure: INSERTION, CATHETER, HEMODIALYSIS, DUAL LUMEN;  Surgeon: Manuel Javier MD;  Location: [facility] OR;  Service: General;  Laterality: Right;    LAPAROTOMY, EXPLORATORY N/A 2023    Procedure: LAPAROTOMY, EXPLORATORY;  Surgeon: Manuel Javier MD;  Location: [facility] OR;  Service: General;  Laterality: N/A;    LAPAROTOMY, EXPLORATORY  N/A 1/16/2023    Procedure: LAPAROTOMY, EXPLORATORY;  Surgeon: Manuel Javier MD;  Location: [facility] OR;  Service: General;  Laterality: N/A;    REMOVAL, TUNNELED CATH Right 1/25/2023    Procedure: REMOVAL, TUNNELED CATH;  Surgeon: Manuel Javier MD;  Location: [facility] OR;  Service: General;  Laterality: Right;    SHOULDER SURGERY  2009 & 2010    right rotator cuff    TONSILLECTOMY      UPPER GASTROINTESTINAL ENDOSCOPY  04/2018       Time Tracking:     PT Received On: 01/29/23  PT Start Time: 1044     PT Stop Time: 1115  PT Total Time (min): 31 min     Billable Minutes: Evaluation 8 and Therapeutic Activity 13 (cotx with OT)      01/29/2023

## 2023-01-29 NOTE — PROGRESS NOTES
[facility] Progress Note    Reason for Consult:     HINA evaluation and management.    Assessment and Plan:     Lily Green is a 72 y.o. 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 Following her surgery her UOP severely diminished and essentially became anuric with only 50ml UOP. Pt was initiated on HD 1/14, she then developed hemorraghic shock and required CRRT, then was transitioned back to iHD. She had issues w/ her line upon arrival to LTAC, so she didn't receive any HD since discharge, now back to address her tunneled cath blockade (previously was believed to be 2/2 vasospasm);     Stage III Anuric HINA  - baseline creatinine 0.8  - Pt with ischemic colitis s/p colectomy 1/14 and another ischemic event 1/16 with hemorrhagic shock.  - ATN with 2 separate insults will likely take several weeks to months to recover  - HD initiated 1/14  - Re-admitted 01/27 w/ mal functional HD catheter  - remains anuric  - Pt received HD yesterday following placement of a new LIJ tunneled cath, tolerated well  - Will resume pt on a MWF schedule  - May resume back on her previous MWF HD schedule  - renally dose all medications      Anion Gap Metabolic Acidosis (Resolved)  - bicarb 25 today     Hyponatremia  - Most consistent with hypervolemic hyponatremia in the setting of pt's current oliguric failure, Na 127 yesterday, improved to 132 after HD    Hypocalcemia  - Calcium 8.0, corrects for low albumin    Acute on chronic Anemia  - Hb ~ 6.8 on admission -> 7.0 --> 6.7 today  - Iron profile w/ low iron sat, consistent w/ some degree of iron deficiency  - Please transfuse x1 unit of pRBCs for Hb < 7.0\  - Pt will benefit from IV iron supplementation, once her infection has been treated    Access:  - Tunneled Rt IJ cath, placed 01/25, currently non-functioning  - S/P placement of a LIJ tunneled cath by surgery 01/28  "and removal of the old RIJ cath    Nutrition:  - Recommend starting renal diet    Subjective:     - No acute events overnight, pt reports much improved today after receiving HD yesterday  - S/P LIJ tunneled cath placement yesterday and removal of the old one, pt tolerated well.     Objective:   Last 24 Hour Vital Signs:  BP  Min: 123/71  Max: 147/84  Temp  Av.1 °F (36.7 °C)  Min: 97.2 °F (36.2 °C)  Max: 99.8 °F (37.7 °C)  Pulse  Av.8  Min: 92  Max: 107  Resp  Av.6  Min: 18  Max: 20  SpO2  Av.3 %  Min: 96 %  Max: 100 %  Height  Av' 7.5" (171.5 cm)  Min: 5' 7.5" (171.5 cm)  Max: 5' 7.5" (171.5 cm)  Weight  Av.2 kg (170 lb 1.4 oz)  Min: 59.5 kg (131 lb 2.8 oz)  Max: 94.8 kg (208 lb 15.9 oz)  I/O last 3 completed shifts:  In: 600 [Other:500; IV Piggyback:100]  Out: 2850 [Other:2500; Stool:350]    Physical Examination:  GEN: NAD, AAO x4  NECK: no cervical or supraclavicular LAD, no JVD, no thyromegaly, LIJ cath in place  CV: Regular rhythm, normal rate, S1/S2 heard, no murmurs/rubs/gallops appreciated.   PULM: Symmetrical expansion, clear to auscultation bilaterally, no wheezes or crackles, on rm air  ABD: soft, non-distended, non-tender to palpation, normoactive BS, no organomegaly, no ascites, no rebound tenderness  EXT: 2+ radial and dorsalis pedis pulses bilaterally, moves all extremities, no obvious deformities; Good thrill in LUE AVF    Laboratory:  Most Recent Data:  CBC:   Lab Results   Component Value Date    WBC 14.85 (H) 2023    HGB 6.7 (L) 2023     2023    MCV 83 2023    RDW 19.1 (H) 2023     BMP:   Lab Results   Component Value Date     (L) 2023    K 4.3 2023    CL 97 2023    CO2 25 2023     (H) 2023    BUN 34 (H) 2023    CREATININE 3.6 (H) 2023    CALCIUM 8.0 (L) 2023    MG 2.0 2023    PHOS 4.0 2023     LFTs:   Lab Results   Component Value Date    PROT 6.3 2023    " ALBUMIN 1.8 (L) 01/29/2023    AST 25 01/29/2023    ALKPHOS 80 01/29/2023    ALT 68 (H) 01/29/2023     Anemia:   Lab Results   Component Value Date    IRON 37 01/28/2023    TIBC 194 (L) 01/28/2023    FERRITIN 837 (H) 01/28/2023     Urinalysis:   Lab Results   Component Value Date    LABURIN No growth 10/11/2021    COLORU Yellow 01/15/2023    CLARITYU Clear 12/23/2020    SPECGRAV 1.015 01/15/2023    NITRITE Negative 01/15/2023    KETONESU Negative 01/15/2023    UROBILINOGEN Negative 01/15/2023    WBCUA 60 (H) 07/02/2019       Trended Lab Data:  Recent Labs   Lab 01/27/23  2213 01/28/23  0425 01/29/23  0350   WBC 18.94* 19.02* 14.85*    373 330   MCV 82 84 83   RDW 19.0* 19.3* 19.1*   * 127* 132*   K 4.8 4.7 4.3   CL 99 97 97   CO2 18* 17* 25   BUN 81* 85* 34*   CREATININE 5.8* 6.1* 3.6*   * 150* 235*   CALCIUM 8.4* 8.2* 8.0*   PROT 6.5 6.4 6.3   ALBUMIN 1.8* 1.8* 1.8*   AST 24 23 25   ALKPHOS 83 84 80   * 95* 68*       Other Relevant Results:  Radiology:  CXR 01/27/2023:  Impression:     Slight kink within the distal aspect of the central venous catheter.    Imaging has been reviewed personally.             Thank you for allowing us to participate in the care of this patient. Please contact me if you have any questions regarding this consult.    Vernon Almanza MD  [facility], [facility]

## 2023-01-29 NOTE — PT/OT/SLP EVAL
Occupational Therapy   Evaluation    Name: Lily Green  MRN: [id number]  Admitting Diagnosis: Problem with vascular access  Recent Surgery: Procedure(s) (LRB):  Insertion,catheter,tunneled (Left)  REMOVAL, CATHETER (Right) 1 Day Post-Op    Recommendations:     Discharge Recommendations:  (TBD:  SNF vs LTAC)  Discharge Equipment Recommendations:  other (see comments) (TBD)  Barriers to discharge:  Decreased caregiver support    Assessment:     Lily Green is a 72 y.o. female with a medical diagnosis of Problem with vascular access.  She presents with the following performance deficits affecting function: weakness, impaired endurance, impaired sensation, impaired self care skills, impaired functional mobility, gait instability, impaired balance, impaired cognition, decreased coordination, decreased lower extremity function, decreased safety awareness, decreased upper extremity function, pain, impaired coordination, impaired skin, impaired cardiopulmonary response to activity.  Pt was agreeable to OT/PT evaluation, but noted with limited participation due to confusion at the time of eval.  Pt's  was present and able to assist with gathering historical information regarding [facility].  Pt was reportedly independent with mobility and ADLs at [facility].  Pt currently requires min-mod A with func mobility and min-max A with ADLs.  Pt is very confused and only oriented to self.  Requires 24/7 supervision for safety.  Goals established to assist pt with returning to [facility] regarding ADLs and func mobility.  Pt will benefit from skilled OT services in order to increase his level of safety and independence with ADLs and mobility.      Rehab Prognosis: Fair; patient would benefit from acute skilled OT services to address these deficits and reach maximum level of function.       Plan:     Patient to be seen 3 x/week to address the above listed problems via self-care/home management, therapeutic activities,  therapeutic exercises  Plan of Care Expires: 02/28/23  Plan of Care Reviewed with: patient, spouse    Subjective     Chief Complaint: pain on bottom of feet when touched (hypersensitive)  Patient/Family Comments/goals:  states he would like for her to return to baseline - she was independent and now is very confused    Occupational Profile:  Living Environment: pt is , however, both her and her  have their separate homes near by each other -  was staying with her to recuperate from a hip surgery and she was caring for him  - pt lives in a [facility] with THE entrance - has access to both WIS and t/s combo for bathing  Previous level of function: independent  Roles and Routines: wife, mother  Equipment Used at Home: none (has access to a BSC but was not using at [facility])  Assistance upon Discharge:  will stay and supervise her, but has limited ability to perform physical tasks due to recent hip sx.     Pain/Comfort:  Pain Rating 1: 0/10  Location - Side 1: Bilateral  Location 1: foot (pain on bottom of B feet when touched or when donning socks - no pain at rest)  Pain Addressed 1: Distraction, Cessation of Activity  Pain Rating Post-Intervention 1: 0/10    Patients cultural, spiritual, Hindu conflicts given the current situation: no    Objective:     Communicated with: nurse prior to session.  Patient found up in chair with oxygen, telemetry, bed alarm, colostomy upon OT entry to room.    General Precautions: Standard, fall  Orthopedic Precautions: N/A  Braces: N/A  Respiratory Status: Nasal cannula, flow 2 L/min    Occupational Performance:    Bed Mobility:    Patient completed Scooting/Bridging with minimum assistance  Patient completed Supine to Sit with minimum assistance  Patient completed Sit to Supine with moderate assistance    Functional Mobility/Transfers:  Patient completed Sit <> Stand Transfer with minimum assistance  with  rolling walker       Activities of Daily  "Living:  Grooming: minimum assistance sitting EOB - washed lower face, but needed A to wipe around eyes  Upper Body Dressing: minimum assistance to hu/doff hospital gown  Lower Body Dressing: total assistance to hu/doff socks    Cognitive/Visual Perceptual:  Cognitive/Psychosocial Skills:     -       Oriented to: Person and only oriented to self   -       Follows Commands/attention:Easily distracted and difficulty following simple instructions -needed increased time and requests repeated to follow  -       Communication: clear/fluent  -       Memory: impaired - pt responding "I don't know" to 90% of questions regarding orientation and PLOF  -       Safety awareness/insight to disability: impaired   -       Mood/Affect/Coping skills/emotional control: confused    Physical Exam:  Balance:    -       Sitting EOB = SBA;  Standing = Min A/CGA for safety  Postural examination/scapula alignment:    -       Rounded shoulders  -       Forward head  Skin integrity: Visible skin intact  Edema:  None noted  Sensation:  hypersensitive on bottom of feet  Upper Extremity Range of Motion:   B UE = WFL for ADLs  Upper Extremity Strength:  B UE = WFL for ADLs   Strength:  B UE = WFL for ADLs    AMPAC 6 Click ADL:  AMPAC Total Score: 15    Treatment & Education:  Pt completed ADLs and func mobility activities for tx session as noted above  Pt educated on role of OT and POC      Patient left HOB elevated with all lines intact, call button in reach, bed alarm on, and  present    GOALS:   Multidisciplinary Problems       Occupational Therapy Goals          Problem: Occupational Therapy    Goal Priority Disciplines Outcome Interventions   Occupational Therapy Goal     OT, PT/OT Ongoing, Progressing    Description: Goals to be met by: 02/29/23    Patient will increase functional independence with ADLs by performing:    UE Dressing with Set-up Assistance.  LE Dressing with Set-up Assistance.  Grooming while standing at sink " with Stand-by Assistance.  Upper extremity exercise program 3x 15 reps per handout, with supervision.                         History:     Past Medical History:   Diagnosis Date    Anxiety     Celiac disease 2018    Celiac disease     Depression     Diabetes mellitus     Family history of breast cancer in mother      at age 68    Hyperlipidemia     Hypertension     Meniere disease     Menopause     Peptic ulcer     Reflux esophagitis     Urinary tract infection     Vaginal infection     Vaginal Pap smear 2014    Pap/Hpv Negative          Past Surgical History:   Procedure Laterality Date    APPENDECTOMY      BREAST SURGERY      Tags    CARPAL TUNNEL RELEASE Bilateral 2017    ,     CLOSURE, COLOSTOMY Left 2023    Procedure: CLOSURE, COLOSTOMY;  Surgeon: Manuel Javier MD;  Location: [facility] OR;  Service: General;  Laterality: Left;    COLONOSCOPY  2018    Normal  ( Juan A)     COLOSTOMY Right 2023    Procedure: CREATION, COLOSTOMY;  Surgeon: Manuel Javier MD;  Location: [facility] OR;  Service: General;  Laterality: Right;    DILATION AND CURETTAGE OF UTERUS  1986    DUPUYTREN CONTRACTURE RELEASE Bilateral 2017    HYSTERECTOMY      BEAU w/ appy, no BSO     INJECTION OF NEUROLYTIC AGENT AROUND LUMBAR SYMPATHETIC NERVE N/A 2022    Procedure: BLOCK, LUMBAR SYMPATHETIC;  Surgeon: Souleymane Rizo Jr., MD;  Location: [facility] PAIN MGT;  Service: Pain Management;  Laterality: N/A;  no pacemaker   pt is diabetic     INJECTION OF NEUROLYTIC AGENT AROUND LUMBAR SYMPATHETIC NERVE N/A 2022    Procedure: BLOCK, LUMBAR SYMPATHETIC;  Surgeon: Souleymane Rizo Jr., MD;  Location: [facility] PAIN [facility]GT;  Service: Pain Management;  Laterality: N/A;  diabetic     INSERTION OF TUNNELED CENTRAL VENOUS HEMODIALYSIS CATHETER Right 2023    Procedure: INSERTION, CATHETER, HEMODIALYSIS, DUAL LUMEN;  Surgeon: Manuel Javier MD;  Location: [facility] OR;  Service: General;  Laterality: Right;     LAPAROTOMY, EXPLORATORY N/A 1/14/2023    Procedure: LAPAROTOMY, EXPLORATORY;  Surgeon: Manuel Javier MD;  Location: [facility] OR;  Service: General;  Laterality: N/A;    LAPAROTOMY, EXPLORATORY N/A 1/16/2023    Procedure: LAPAROTOMY, EXPLORATORY;  Surgeon: Manuel Javier MD;  Location: [facility] OR;  Service: General;  Laterality: N/A;    REMOVAL, TUNNELED CATH Right 1/25/2023    Procedure: REMOVAL, TUNNELED CATH;  Surgeon: Manuel Javier MD;  Location: [facility] OR;  Service: General;  Laterality: Right;    SHOULDER SURGERY  2009 & 2010    right rotator cuff    TONSILLECTOMY      UPPER GASTROINTESTINAL ENDOSCOPY  04/2018       Time Tracking:     OT Date of Treatment: 01/29/23  OT Start Time: 1043  OT Stop Time: 1114  OT Total Time (min): 31 min (18 min billable - cotx with PT)    Billable Minutes:Evaluation 8  Self Care/Home Management 10    1/29/2023

## 2023-01-29 NOTE — ANESTHESIA RELEASE NOTE
"Anesthesia Release from PACU Note    Patient: Lily Green    Procedure(s) Performed: Procedure(s) (LRB):  Insertion,catheter,tunneled (Left)  REMOVAL, CATHETER (Right)    Anesthesia type: general    Post pain: Adequate analgesia    Post assessment: no apparent anesthetic complications, tolerated procedure well and no evidence of recall    Last Vitals:   Visit Vitals  /74   Pulse 96   Temp 36.7 °C (98.1 °F) (Tympanic)   Resp 17   Ht 5' 5" (1.651 m)   Wt 58.9 kg (129 lb 13.6 oz)   LMP  (LMP Unknown)   SpO2 98%   BMI 21.61 kg/m²       Post vital signs: stable    Level of consciousness: awake    Nausea/Vomiting: no nausea/no vomiting    Complications: none    Airway Patency: patent    Respiratory: unassisted, spontaneous ventilation, room air    Cardiovascular: stable and blood pressure at baseline    Hydration: euvolemic  "

## 2023-01-29 NOTE — ASSESSMENT & PLAN NOTE
Likely 2/2 to shock from prior admission  Tunnel cath placed 1/25/23  Line became blocked at HD on 1/25/23 and 1/27/23    Plan  -Line replaced  -Strict I&Os,maintain euvolemia   -Avoid renal toxic meds   -Continue to monitor renal status and urine output   -Nephro consulted. Appreciate recs. HD resumed on 1/28/23   - Will remain in patient for a second round of HD

## 2023-01-29 NOTE — PLAN OF CARE
"  Problem: Physical Therapy  Goal: Physical Therapy Goal  Description: Goals to be met by: 23     Patient will increase functional independence with mobility by performin. Supine to sit with Stand-by Assistance  2. Sit to supine with Stand-by Assistance  3. Sit to stand transfer with Stand-by Assistance  4. Bed to chair transfer with Stand-by Assistance using LRAD.  5. Gait  x 25 feet with Contact Guard Assistance using LRAD.     Outcome: Ongoing, Progressing     PT Eval completed, note to follow. Pt is confused and demonstrating impaired insight into deficits / poor overall awareness. Pt reports "I don't know" when asked questions. Pt requires max cueing and prompting for sequencing tasks and motor planning. Pt requires Dina-modA for bed mobility. Pt able to take ~2-3 side steps L toward HOB with HHA and Dina-ModA. Pt limited by cough and weakness. Pt returned supine and SpO2 97% on 2 L. Recommending post acute placement / therapy.   "

## 2023-01-29 NOTE — ASSESSMENT & PLAN NOTE
Holding home lyrica due to kidney function  Plan  Discuss alternative treatment options with Nephro  PRN cold water foot path

## 2023-01-29 NOTE — PROGRESS NOTES
[facility] Medicine  Progress Note    Patient Name: Lily Green  MRN: [id number]  Patient Class: OP- Observation   Admission Date: 1/27/2023  Length of Stay: 0 days  Attending Physician: Kavita Beaz MD  Primary Care Provider: Pavel Calixto MD        Subjective:     Principal Problem:Problem with vascular access        HPI:  Patient is a 71 y/o female with PMHx of diabetes type 2 (treated with metformin), HTN, and celiac disease (following a strict diet), Acute kidney failure on HD, ischemic colitis s/p total cholectomy who represented to [facility] one day after discharge due to issues with her tunneeled HD catherter while in LTAC.  On previous admission patient was noted to have severe peritonitis secondary to ischemic bowel necrosis.  Underwent total colectomy.  Subsequently developed brisk postop bleed with severe blood loss.  Bleeding was stopped with subsequent ex lap however patient developed worsening ATN and was initiated on CRRT at the time.  When patient was hemodynamically stable, received tunnel cath placement on 01/25/2023.  Initial HD run using tunneled catheter was stopped early due to blockage.  It was suspected that time to be from vasospasm.  Subsequent HD was performed successfully on 01/26/2023 and patient was discharged to LT to continue Monday Wednesday Friday dialysis, PTOT and to finish 14 day course of antibiotics.  While at [facility], patient was clinically stable however when dialysis was attempted on Friday 01/27/2023, line was noted to be blocked and clotted.  Nephrology at [facility] recommended patient to return to [facility] for line revision.    Upon arrival to the ED, the patient appears clinically stable but chronically ill.  Mentation and clinical status at baseline per family in the room consistent with patient's status at discharge from hospital 1 day prior.  Labs drawn in ED did not reveal emergent need for dialysis.  Electrolytes and kidney function appears  stable from previous labs.  However, patient's hemoglobin noted to be 6.8 down from 8.5 yesterday.  Patient has no obvious sources of bleeding at this time.  Is hemodynamically stable.  Patient was typed and screened and consented however no blood products were given until dialysis access could be established.  Nephrology was consulted and recommended to fix access in the morning as patient does not have emergent need for dialysis at this time.  Patient was admitted to [facility] family Medicine for replacement of tunneled dialysis line and asymptomatic anemia.      Overview/Hospital Course:  No notes on file    Interval History: NAEON    Review of Systems   Constitutional:  Positive for fatigue. Negative for fever.   HENT:  Negative for congestion and sore throat.    Eyes:  Negative for visual disturbance.   Respiratory:  Negative for apnea, shortness of breath and wheezing.    Cardiovascular:  Negative for chest pain.   Gastrointestinal:  Negative for nausea and vomiting.   Genitourinary:  Negative for dysuria.   Musculoskeletal:  Negative for arthralgias and back pain.   Neurological:  Negative for dizziness and headaches.        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    Objective:     Vital Signs (Most Recent):  Temp: 97.3 °F (36.3 °C) (01/29/23 1215)  Pulse: 96 (01/29/23 1215)  Resp: 18 (01/29/23 1215)  BP: (!) 143/65 (01/29/23 1215)  SpO2: 96 % (01/29/23 1215)   Vital Signs (24h Range):  Temp:  [97.2 °F (36.2 °C)-98.3 °F (36.8 °C)] 97.3 °F (36.3 °C)  Pulse:  [] 96  Resp:  [18-20] 18  SpO2:  [96 %-99 %] 96 %  BP: (123-147)/(65-84) 143/65     Weight: 94.8 kg (208 lb 15.9 oz)  Body mass index is 32.25 kg/m².    Intake/Output Summary (Last 24 hours) at 1/29/2023 1435  Last data filed at 1/28/2023 1930  Gross per 24 hour   Intake 500 ml   Output 2600 ml   Net -2100 ml      Physical Exam  HENT:      Head: Normocephalic.      Right Ear: External ear normal.      Left  Ear: External ear normal.      Nose: Nose normal.      Mouth/Throat:      Mouth: Mucous membranes are moist.   Eyes:      Pupils: Pupils are equal, round, and reactive to light.   Cardiovascular:      Rate and Rhythm: Normal rate.      Pulses: Normal pulses.   Pulmonary:      Effort: Pulmonary effort is normal.   Abdominal:      General: Abdomen is flat.      Tenderness: There is abdominal tenderness.      Comments: Laparotomy and Ostomy sites clear clean and intact  solid output per ileostomy    Skin:     General: Skin is warm.   Neurological:      Mental Status: She is alert.      Comments: Follows all commands  Limited speech, able to communicate non verbally   Psychiatric:      Comments: Some confusion        Significant Labs: All pertinent labs within the past 24 hours have been reviewed.  BMP:   Recent Labs   Lab 01/27/23 2213 01/28/23 0425 01/29/23  0350   *   < > 235*   *   < > 132*   K 4.8   < > 4.3   CL 99   < > 97   CO2 18*   < > 25   BUN 81*   < > 34*   CREATININE 5.8*   < > 3.6*   CALCIUM 8.4*   < > 8.0*   MG 2.0  --   --     < > = values in this interval not displayed.     CBC:   Recent Labs   Lab 01/27/23 2213 01/28/23 0425 01/29/23  0350   WBC 18.94* 19.02* 14.85*   HGB 6.8* 7.0* 6.7*   HCT 20.1* 21.2* 20.0*    373 330     CMP:   Recent Labs   Lab 01/27/23 2213 01/28/23  0425 01/29/23  0350   * 127* 132*   K 4.8 4.7 4.3   CL 99 97 97   CO2 18* 17* 25   * 150* 235*   BUN 81* 85* 34*   CREATININE 5.8* 6.1* 3.6*   CALCIUM 8.4* 8.2* 8.0*   PROT 6.5 6.4 6.3   ALBUMIN 1.8* 1.8* 1.8*   BILITOT 0.3 0.3 0.2   ALKPHOS 83 84 80   AST 24 23 25   * 95* 68*   ANIONGAP 11 13 10       Significant Imaging: I have reviewed all pertinent imaging results/findings within the past 24 hours.      Assessment/Plan:      Primary hypertension  Continue amlodipine      Hyponatremia  Pt remains hyponatremic, potentially 2/2 to volume overload, missed HD    Plan  Follow nephro  Recs  Fix HD access to resume HD      ATN (acute tubular necrosis)  Likely 2/2 to shock from prior admission  Tunnel cath placed 1/25/23  Line became blocked at HD on 1/25/23 and 1/27/23    Plan  -Line replaced  -Strict I&Os,maintain euvolemia   -Avoid renal toxic meds   -Continue to monitor renal status and urine output   -Nephro consulted. Appreciate recs. HD resumed on 1/28/23   - Will remain in patient for a second round of HD    Large bowel ischemia  #Gram negative Bacteremia  -CT Abd shows possible hematoma but no signs fo acute infection or abscess   - Gram negative rods reported in Blood culture from 1/13  WBC improved to 43 form 45 1/22/23    Plan  - Continue Cefepime, flagyl for total of 14 day course End date 1/28/23  -  All further cultures on 1/17 w/ NGT  - afebrile  - PT/OT        Idiopathic neuropathy  Holding home lyrica due to kidney function  Plan  Discuss alternative treatment options with Nephro  PRN cold water foot path      Type 2 diabetes mellitus with diabetic polyneuropathy, without long-term current use of insulin  SSI  Detemir 7U nightly  POCT BG QID with meals      VTE Risk Mitigation (From admission, onward)         Ordered     heparin (porcine) injection 2,400 Units  As needed (PRN)         01/27/23 2307                Discharge Planning   CAMPBELL:      Code Status: Full Code   Is the patient medically ready for discharge?:     Reason for patient still in hospital (select all that apply): Treatment and Consult recommendations                     Bal Burr MD  [employer] [facility] PGY-2  01/29/2023

## 2023-01-29 NOTE — PLAN OF CARE
Problem: Occupational Therapy  Goal: Occupational Therapy Goal  Description: Goals to be met by: 02/29/23    Patient will increase functional independence with ADLs by performing:    UE Dressing with Set-up Assistance.  LE Dressing with Set-up Assistance.  Grooming while standing at sink with Stand-by Assistance.  Upper extremity exercise program 3x 15 reps per handout, with supervision.    Outcome: Ongoing, Progressing     Pt was agreeable to OT/PT evaluation, but noted with limited participation due to confusion at the time of eval.  Pt's  was present and able to assist with gathering historical information regarding PLOF.  Pt was reportedly independent with mobility and ADLs at [facility].  Pt currently requires min-mod A with func mobility and min-max A with ADLs.  Pt is very confused and only oriented to self.  Requires 24/7 supervision for safety.  Goals established to assist pt with returning to PLOF regarding ADLs and func mobility.  Pt will benefit from skilled OT services in order to increase his level of safety and independence with ADLs and mobility.      Tamera Mclean, OT  1/29/2023

## 2023-01-30 PROBLEM — D64.9 ANEMIA: Status: ACTIVE | Noted: 2023-01-30

## 2023-01-30 PROBLEM — E83.39 HYPOPHOSPHATEMIA: Status: ACTIVE | Noted: 2023-01-30

## 2023-01-30 LAB
ALBUMIN SERPL BCP-MCNC: 1.7 G/DL (ref 3.5–5.2)
ALP SERPL-CCNC: 72 U/L (ref 55–135)
ALT SERPL W/O P-5'-P-CCNC: 43 U/L (ref 10–44)
ANION GAP SERPL CALC-SCNC: 10 MMOL/L (ref 8–16)
ANISOCYTOSIS BLD QL SMEAR: SLIGHT
AST SERPL-CCNC: 19 U/L (ref 10–40)
BASOPHILS # BLD AUTO: 0.08 K/UL (ref 0–0.2)
BASOPHILS NFR BLD: 0.6 % (ref 0–1.9)
BILIRUB SERPL-MCNC: 0.2 MG/DL (ref 0.1–1)
BLD PROD TYP BPU: NORMAL
BLOOD UNIT EXPIRATION DATE: NORMAL
BLOOD UNIT TYPE CODE: 6200
BLOOD UNIT TYPE: NORMAL
BUN SERPL-MCNC: 65 MG/DL (ref 8–23)
CALCIUM SERPL-MCNC: 8.2 MG/DL (ref 8.7–10.5)
CHLORIDE SERPL-SCNC: 97 MMOL/L (ref 95–110)
CO2 SERPL-SCNC: 22 MMOL/L (ref 23–29)
CODING SYSTEM: NORMAL
CREAT SERPL-MCNC: 5.3 MG/DL (ref 0.5–1.4)
DIFFERENTIAL METHOD: ABNORMAL
DISPENSE STATUS: NORMAL
EOSINOPHIL # BLD AUTO: 0.1 K/UL (ref 0–0.5)
EOSINOPHIL NFR BLD: 0.8 % (ref 0–8)
ERYTHROCYTE [DISTWIDTH] IN BLOOD BY AUTOMATED COUNT: 19.6 % (ref 11.5–14.5)
EST. GFR  (NO RACE VARIABLE): 8 ML/MIN/1.73 M^2
GLUCOSE SERPL-MCNC: 199 MG/DL (ref 70–110)
HCT VFR BLD AUTO: 19.3 % (ref 37–48.5)
HGB BLD-MCNC: 6.5 G/DL (ref 12–16)
HYPOCHROMIA BLD QL SMEAR: ABNORMAL
IMM GRANULOCYTES # BLD AUTO: 0.44 K/UL (ref 0–0.04)
IMM GRANULOCYTES NFR BLD AUTO: 3 % (ref 0–0.5)
LYMPHOCYTES # BLD AUTO: 1.7 K/UL (ref 1–4.8)
LYMPHOCYTES NFR BLD: 12 % (ref 18–48)
MCH RBC QN AUTO: 28.1 PG (ref 27–31)
MCHC RBC AUTO-ENTMCNC: 33.7 G/DL (ref 32–36)
MCV RBC AUTO: 84 FL (ref 82–98)
MONOCYTES # BLD AUTO: 2.1 K/UL (ref 0.3–1)
MONOCYTES NFR BLD: 14.3 % (ref 4–15)
NEUTROPHILS # BLD AUTO: 10 K/UL (ref 1.8–7.7)
NEUTROPHILS NFR BLD: 69.3 % (ref 38–73)
NRBC BLD-RTO: 0 /100 WBC
OVALOCYTES BLD QL SMEAR: ABNORMAL
PLATELET # BLD AUTO: 312 K/UL (ref 150–450)
PLATELET BLD QL SMEAR: ABNORMAL
PMV BLD AUTO: 9.9 FL (ref 9.2–12.9)
POCT GLUCOSE: 211 MG/DL (ref 70–110)
POCT GLUCOSE: 222 MG/DL (ref 70–110)
POCT GLUCOSE: 259 MG/DL (ref 70–110)
POIKILOCYTOSIS BLD QL SMEAR: SLIGHT
POTASSIUM SERPL-SCNC: 4.7 MMOL/L (ref 3.5–5.1)
PROT SERPL-MCNC: 6.4 G/DL (ref 6–8.4)
RBC # BLD AUTO: 2.31 M/UL (ref 4–5.4)
SODIUM SERPL-SCNC: 129 MMOL/L (ref 136–145)
TRANS ERYTHROCYTES VOL PATIENT: NORMAL ML
WBC # BLD AUTO: 14.43 K/UL (ref 3.9–12.7)

## 2023-01-30 PROCEDURE — 94799 UNLISTED PULMONARY SVC/PX: CPT

## 2023-01-30 PROCEDURE — 27000221 HC OXYGEN, UP TO 24 HOURS

## 2023-01-30 PROCEDURE — 25000003 PHARM REV CODE 250: Performed by: STUDENT IN AN ORGANIZED HEALTH CARE EDUCATION/TRAINING PROGRAM

## 2023-01-30 PROCEDURE — 36415 COLL VENOUS BLD VENIPUNCTURE: CPT | Performed by: STUDENT IN AN ORGANIZED HEALTH CARE EDUCATION/TRAINING PROGRAM

## 2023-01-30 PROCEDURE — P9021 RED BLOOD CELLS UNIT: HCPCS | Performed by: STUDENT IN AN ORGANIZED HEALTH CARE EDUCATION/TRAINING PROGRAM

## 2023-01-30 PROCEDURE — 97530 THERAPEUTIC ACTIVITIES: CPT | Mod: CQ

## 2023-01-30 PROCEDURE — 97530 THERAPEUTIC ACTIVITIES: CPT

## 2023-01-30 PROCEDURE — 85025 COMPLETE CBC W/AUTO DIFF WBC: CPT | Performed by: STUDENT IN AN ORGANIZED HEALTH CARE EDUCATION/TRAINING PROGRAM

## 2023-01-30 PROCEDURE — G0378 HOSPITAL OBSERVATION PER HR: HCPCS

## 2023-01-30 PROCEDURE — 80100016 HC MAINTENANCE HEMODIALYSIS

## 2023-01-30 PROCEDURE — 36430 TRANSFUSION BLD/BLD COMPNT: CPT

## 2023-01-30 PROCEDURE — 97110 THERAPEUTIC EXERCISES: CPT | Mod: CQ

## 2023-01-30 PROCEDURE — 99900035 HC TECH TIME PER 15 MIN (STAT)

## 2023-01-30 PROCEDURE — 94761 N-INVAS EAR/PLS OXIMETRY MLT: CPT

## 2023-01-30 PROCEDURE — G0257 UNSCHED DIALYSIS ESRD PT HOS: HCPCS

## 2023-01-30 PROCEDURE — 63600175 PHARM REV CODE 636 W HCPCS: Performed by: FAMILY MEDICINE

## 2023-01-30 PROCEDURE — 63600175 PHARM REV CODE 636 W HCPCS: Mod: TB,JG | Performed by: STUDENT IN AN ORGANIZED HEALTH CARE EDUCATION/TRAINING PROGRAM

## 2023-01-30 PROCEDURE — 80053 COMPREHEN METABOLIC PANEL: CPT | Performed by: STUDENT IN AN ORGANIZED HEALTH CARE EDUCATION/TRAINING PROGRAM

## 2023-01-30 RX ORDER — CYANOCOBALAMIN 1000 UG/ML
1000 INJECTION, SOLUTION INTRAMUSCULAR; SUBCUTANEOUS
Status: CANCELLED | OUTPATIENT
Start: 2023-02-26

## 2023-01-30 RX ORDER — TALC
9 POWDER (GRAM) TOPICAL NIGHTLY PRN
Status: CANCELLED | OUTPATIENT
Start: 2023-01-30

## 2023-01-30 RX ORDER — GUAIFENESIN/DEXTROMETHORPHAN 100-10MG/5
5 SYRUP ORAL EVERY 4 HOURS PRN
Status: CANCELLED | OUTPATIENT
Start: 2023-01-30

## 2023-01-30 RX ORDER — HYDROCODONE BITARTRATE AND ACETAMINOPHEN 500; 5 MG/1; MG/1
TABLET ORAL
Status: DISCONTINUED | OUTPATIENT
Start: 2023-01-30 | End: 2023-02-01 | Stop reason: HOSPADM

## 2023-01-30 RX ORDER — PANTOPRAZOLE SODIUM 40 MG/1
40 TABLET, DELAYED RELEASE ORAL DAILY
Status: CANCELLED | OUTPATIENT
Start: 2023-01-31

## 2023-01-30 RX ORDER — SODIUM CHLORIDE 0.9 % (FLUSH) 0.9 %
10 SYRINGE (ML) INJECTION
Status: CANCELLED | OUTPATIENT
Start: 2023-01-30

## 2023-01-30 RX ORDER — FERROUS SULFATE, DRIED 160(50) MG
1 TABLET, EXTENDED RELEASE ORAL DAILY
Status: CANCELLED | OUTPATIENT
Start: 2023-01-31

## 2023-01-30 RX ORDER — HEPARIN SODIUM 5000 [USP'U]/ML
2400 INJECTION, SOLUTION INTRAVENOUS; SUBCUTANEOUS
Status: CANCELLED | OUTPATIENT
Start: 2023-01-30

## 2023-01-30 RX ORDER — SODIUM CHLORIDE 9 MG/ML
INJECTION, SOLUTION INTRAVENOUS
Status: CANCELLED | OUTPATIENT
Start: 2023-01-30

## 2023-01-30 RX ORDER — LIDOCAINE 50 MG/G
1 PATCH TOPICAL DAILY PRN
Status: CANCELLED | OUTPATIENT
Start: 2023-01-30

## 2023-01-30 RX ORDER — MECLIZINE HCL 12.5 MG 12.5 MG/1
25 TABLET ORAL 3 TIMES DAILY PRN
Status: CANCELLED | OUTPATIENT
Start: 2023-01-30

## 2023-01-30 RX ORDER — GLUCAGON 1 MG
1 KIT INJECTION
Status: CANCELLED | OUTPATIENT
Start: 2023-01-30

## 2023-01-30 RX ORDER — LOPERAMIDE HYDROCHLORIDE 2 MG/1
2 CAPSULE ORAL 2 TIMES DAILY
Status: CANCELLED | OUTPATIENT
Start: 2023-01-30

## 2023-01-30 RX ORDER — INSULIN ASPART 100 [IU]/ML
1-10 INJECTION, SOLUTION INTRAVENOUS; SUBCUTANEOUS
Status: CANCELLED | OUTPATIENT
Start: 2023-01-30

## 2023-01-30 RX ORDER — ACETAMINOPHEN 325 MG/1
650 TABLET ORAL EVERY 8 HOURS PRN
Status: CANCELLED | OUTPATIENT
Start: 2023-01-30

## 2023-01-30 RX ORDER — SODIUM CHLORIDE 0.9 % (FLUSH) 0.9 %
5 SYRINGE (ML) INJECTION
Status: CANCELLED | OUTPATIENT
Start: 2023-01-30

## 2023-01-30 RX ORDER — AMLODIPINE BESYLATE 5 MG/1
5 TABLET ORAL DAILY
Status: CANCELLED | OUTPATIENT
Start: 2023-01-31

## 2023-01-30 RX ORDER — HYDROCODONE BITARTRATE AND ACETAMINOPHEN 500; 5 MG/1; MG/1
TABLET ORAL
Status: CANCELLED | OUTPATIENT
Start: 2023-01-30

## 2023-01-30 RX ORDER — HYDROMORPHONE HYDROCHLORIDE 1 MG/ML
0.2 INJECTION, SOLUTION INTRAMUSCULAR; INTRAVENOUS; SUBCUTANEOUS EVERY 5 MIN PRN
Status: CANCELLED | OUTPATIENT
Start: 2023-01-30

## 2023-01-30 RX ORDER — ONDANSETRON 2 MG/ML
4 INJECTION INTRAMUSCULAR; INTRAVENOUS EVERY 8 HOURS PRN
Status: CANCELLED | OUTPATIENT
Start: 2023-01-30

## 2023-01-30 RX ORDER — FINASTERIDE 5 MG/1
5 TABLET, FILM COATED ORAL DAILY
Status: CANCELLED | OUTPATIENT
Start: 2023-01-31

## 2023-01-30 RX ADMIN — LOPERAMIDE HYDROCHLORIDE 2 MG: 2 CAPSULE ORAL at 08:01

## 2023-01-30 RX ADMIN — HEPARIN SODIUM 2400 UNITS: 5000 INJECTION, SOLUTION INTRAVENOUS; SUBCUTANEOUS at 01:01

## 2023-01-30 RX ADMIN — INSULIN ASPART 4 UNITS: 100 INJECTION, SOLUTION INTRAVENOUS; SUBCUTANEOUS at 08:01

## 2023-01-30 RX ADMIN — PANTOPRAZOLE SODIUM 40 MG: 40 TABLET, DELAYED RELEASE ORAL at 08:01

## 2023-01-30 RX ADMIN — INSULIN ASPART 2 UNITS: 100 INJECTION, SOLUTION INTRAVENOUS; SUBCUTANEOUS at 08:01

## 2023-01-30 RX ADMIN — AMLODIPINE BESYLATE 5 MG: 5 TABLET ORAL at 08:01

## 2023-01-30 RX ADMIN — EPOETIN ALFA-EPBX 10000 UNITS: 10000 INJECTION, SOLUTION INTRAVENOUS; SUBCUTANEOUS at 01:01

## 2023-01-30 RX ADMIN — HYDROCODONE BITARTRATE AND ACETAMINOPHEN 1 TABLET: 5; 325 TABLET ORAL at 08:01

## 2023-01-30 RX ADMIN — Medication 9 MG: at 08:01

## 2023-01-30 RX ADMIN — INSULIN ASPART 6 UNITS: 100 INJECTION, SOLUTION INTRAVENOUS; SUBCUTANEOUS at 05:01

## 2023-01-30 RX ADMIN — CEFEPIME HYDROCHLORIDE 2 G: 2 INJECTION, SOLUTION INTRAVENOUS at 07:01

## 2023-01-30 NOTE — ASSESSMENT & PLAN NOTE
Likely 2/2 to shock from prior admission  Tunnel cath placed 1/25/23  Line became blocked at HD on 1/25/23 and 1/27/23    Plan  -Line replaced  -Strict I&Os,maintain euvolemia   -Avoid renal toxic meds   -Continue to monitor renal status and urine output   -Nephro consulted. Appreciate recs. HD resumed on 1/28/23   - Will remain in patient for a second round of HD  - Requires 1 unit Rbcs due to anemia

## 2023-01-30 NOTE — PLAN OF CARE
Patient on oxygen with documented flow.  Will attempt to wean per O2 order protocol. Lung expansion therapy. Will continue to monitor.

## 2023-01-30 NOTE — ASSESSMENT & PLAN NOTE
Access non functional in HD  S/p removal of Right IJ line  Left IJ line placed  Plan  Has received 1 round of dialysis  Second round of dialysis to occur today

## 2023-01-30 NOTE — PROGRESS NOTES
01/30/23 1400   Handoff Report   Given To Michelle Gamez RN   Vital Signs   Temp 98.4 °F (36.9 °C)   Temp src Temporal   Pulse 103   Heart Rate Source Monitor   Resp 18   Device (Oxygen Therapy) room air   /73   BP Location Right arm   BP Method Automatic   Patient Position Lying        Hemodialysis Catheter 01/25/23 1501 right internal jugular   Placement Date/Time: 01/25/23 1501   Present Prior to Hospital Arrival?: No  Hand Hygiene: Performed  Barrier Precautions: Performed  Skin Antisepsis: ChloraPrep  Hemodialysis Catheter Type: Tunneled catheter  Location: right internal jugular  Cathete...   Venous Patency/Care flushed w/o difficulty;heparin locked   Arterial Patency/Care flushed w/o difficulty;heparin locked   Post-Hemodialysis Assessment   Rinseback Volume (mL) 250 mL   Blood Volume Processed (Liters) 63 L   Dialyzer Clearance Moderately streaked   Duration of Treatment 180 minutes   Additional Fluid Intake (mL) 800 mL   Total UF (mL) 3300 mL   Net Fluid Removal 2500   Patient Response to Treatment tolerated well with Uf goal decreased from 3L   Post-Hemodialysis Comments Lines dc'd. CVC flushed and locked per P and P. VSS. Heels repositioned on pillow for c/o pain to BL feet. No other complaints at this time.

## 2023-01-30 NOTE — PT/OT/SLP PROGRESS
Physical Therapy Treatment    Patient Name:  Lily Green   MRN:  [id number]    Recommendations:     Discharge Recommendations:  (post acute placement / therapy)  Discharge Equipment Recommendations:  (TBD)  Barriers to discharge:  decreased strength/functional mobility    Assessment:     Lily Green is a 72 y.o. female admitted with a medical diagnosis of Problem with vascular access.  She presents with the following impairments/functional limitations: weakness, impaired endurance, impaired self care skills, impaired functional mobility, gait instability, impaired balance, decreased coordination, impaired coordination, decreased upper extremity function, decreased lower extremity function, decreased safety awareness, pain Pt would continue to benefit from P.T. To address impairments listed above.  .    Rehab Prognosis: Fair; patient would benefit from acute skilled PT services to address these deficits and reach maximum level of function.    Recent Surgery: Procedure(s) (LRB):  Insertion,catheter,tunneled (Left)  REMOVAL, CATHETER (Right) 2 Days Post-Op    Plan:     During this hospitalization, patient to be seen 5 x/week to address the identified rehab impairments via gait training, therapeutic activities, therapeutic exercises, neuromuscular re-education and progress toward the following goals:    Plan of Care Expires:  02/28/23    Subjective     Patient/Family Comments/goals: Pt agreed to tx.  Pain/Comfort:  Pain Rating 1:  (neuropathy pain B feet; did not rate)  Pain Rating Post-Intervention 1:  (as above)      Objective:     Communicated with RN prior to session.  Patient found supine with colostomy, telemetry, peripheral IV, bed alarm upon PT entry to room.     General Precautions: Standard, fall  Orthopedic Precautions: N/A  Braces: N/A  Respiratory Status: Room air     Functional Mobility:  Bed Mobility:     Rolling Left:  contact guard assistance  Scooting: contact guard assistance  and with vc's to EOB  Supine to Sit: contact guard assistance, minimum assistance, and with HOB elevated and b/r for assist  Sit to Supine: minimum assistance  Transfers:     Sit to Stand:  minimum assistance with hand-held assist and rolling walker  Balance: sitting fair+, standing fair/fair-      AM-PAC 6 CLICK MOBILITY  Turning over in bed (including adjusting bedclothes, sheets and blankets)?: 3  Sitting down on and standing up from a chair with arms (e.g., wheelchair, bedside commode, etc.): 3  Moving from lying on back to sitting on the side of the bed?: 3  Moving to and from a bed to a chair (including a wheelchair)?: 3  Need to walk in hospital room?: 2  Climbing 3-5 steps with a railing?: 1  Basic Mobility Total Score: 15       Treatment & Education:  Attempted to see pt earlier, but she had just returned from dialysis and was eating.  Upon return pt agreed to tx.  Pt sat EOB ~15 mins and performed BLE therex: AP x 20 reps initially with assistance for proper technique, LAQs with occasional CGA and vc's, hip flexion 10 x 2 reps.  Pt stood with RW and became dizzy and weak and returned to sitting EOB.  BP was taken - 103/54mmHg; HR 112bpm.  Pt then stood with RW and Dina/CGA secondary to mild sway with BP taken 93/61mmHg; HR 123bpm. Pt returned to sitting EOB secondary to increased dizziness and feeling weak.  Pt was assisted supine secondary to dizziness and c/o feeling weak.  BP taken 2 mins after pt returning supine - 114/70mmHg; HR 102bpm.  Pt was assisted with positioning for comfort with BLE elevated with feet floating off pillow for pressure relief.    Patient left supine with all lines intact, call button in reach, bed alarm on, and RN present..    GOALS:   Multidisciplinary Problems       Physical Therapy Goals          Problem: Physical Therapy    Goal Priority Disciplines Outcome Goal Variances Interventions   Physical Therapy Goal     PT, PT/OT Ongoing, Progressing     Description: Goals to be  met by: 23     Patient will increase functional independence with mobility by performin. Supine to sit with Stand-by Assistance  2. Sit to supine with Stand-by Assistance  3. Sit to stand transfer with Stand-by Assistance  4. Bed to chair transfer with Stand-by Assistance using LRAD.  5. Gait  x 25 feet with Contact Guard Assistance using LRAD.                          Time Tracking:     PT Received On: 23  PT Start Time: 1519     PT Stop Time: 1544  PT Total Time (min): 25 min     Billable Minutes: Therapeutic Activity 14 and Therapeutic Exercise 11    Treatment Type: Treatment  PT/PTA: PTA     PTA Visit Number: 1     2023

## 2023-01-30 NOTE — PROGRESS NOTES
[facility] Progress Note    Subjective:      Ms. Green is doing well. Seen on dialysis today and tolerating without issues. Line working well. Blood with HD today. No new complaints though a bit more tired today.      Objective:   Last 24 Hour Vital Signs:  BP  Min: 138/72  Max: 148/74  Temp  Av.9 °F (36.6 °C)  Min: 96.8 °F (36 °C)  Max: 99.8 °F (37.7 °C)  Pulse  Av.8  Min: 78  Max: 107  Resp  Av.9  Min: 18  Max: 20  SpO2  Av.8 %  Min: 96 %  Max: 100 %  Weight  Av.8 kg (208 lb 15.9 oz)  Min: 94.8 kg (208 lb 15.9 oz)  Max: 94.8 kg (208 lb 15.9 oz)  I/O last 3 completed shifts:  In: -   Out: 250 [Stool:250]    Physical Examination:  Gen: awake, alert  HEENT: normocephalic, EOMI, MMM  CV: RRR, no murmurs appreciated  Lungs: CTAB, symmetric chest rise  GI: Soft, non tender, non distended  Extrem: no edema, clubbing, or cyanosis  Skin: dry, warm, intact.   Neuro: AAOx3, no focal deficits         Laboratory:  Recent Labs   Lab 23  0425 23  0350 23  0330   WBC 19.02* 14.85* 14.43*   HGB 7.0* 6.7* 6.5*   HCT 21.2* 20.0* 19.3*    330 312   * 132* 129*   K 4.7 4.3 4.7   CL 97 97 97   CREATININE 6.1* 3.6* 5.3*   BUN 85* 34* 65*   CO2 17* 25 22*   ALT 95* 68* 43   AST 23 25 19           Radiology:  Imaging has been reviewed personally.       Assessment and Plan:     Lily Green is a 72 y.o. woman with a history of celiac disease, DM2 well controlled, HTN, anxiety, and recent incident of necrotizing ischemic colitis s/p colectomy on . Developed ATN with refractory acidosis and started on HD  with second ischemic insult after hemorrhagic shock on . Was discharged but came back in after malfunction of tunneled line.      Stage III Anuric HINA  - baseline creatinine 0.8  - Pt with ischemic colitis s/p colectomy  and another ischemic event  with hemorrhagic shock.  - ATN with 2 separate insults will likely take several weeks to months to  recover  - HD initiated 1/14  - tunneled line replaced 1/28  - HD today, resume MWF schedule  - renally dose all medications      Hyponatremia  - Most consistent with hypervolemic hyponatremia given anuria. Na today 129 and expect to improve with HD     Hypocalcemia  - Calcium 8.0, corrects for low albumin     Acute on chronic Anemia  - Hb ~ 6.5; transfuse today with HD  - epo with HD ordered as well      Hypophosphatemia  - now resolved since last admission with eating and drinking. Monitor for rising phosphorus given continued anuric HINA. May need binder started eventually       Thank you for allowing us to participate in the care of this patient. Please call with any questions.       Elizabeth Mobley, DO  [facility]

## 2023-01-30 NOTE — SUBJECTIVE & OBJECTIVE
Interval History: NAEON    Review of Systems   Reason unable to perform ROS: Limited responses to questions.   Constitutional:  Positive for fatigue. Negative for fever.   HENT:  Negative for congestion and sore throat.    Eyes:  Negative for visual disturbance.   Respiratory:  Negative for apnea, shortness of breath and wheezing.    Cardiovascular:  Negative for chest pain.   Gastrointestinal:  Negative for nausea and vomiting.   Genitourinary:  Negative for dysuria.   Musculoskeletal:  Negative for arthralgias and back pain.   Neurological:  Negative for dizziness and headaches.        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    Objective:     Vital Signs (Most Recent):  Temp: 97.3 °F (36.3 °C) (01/30/23 0751)  Pulse: 87 (01/30/23 0751)  Resp: 18 (01/30/23 0751)  BP: (!) 148/74 (01/30/23 0751)  SpO2: 97 % (01/30/23 0751)   Vital Signs (24h Range):  Temp:  [96.8 °F (36 °C)-99.8 °F (37.7 °C)] 97.3 °F (36.3 °C)  Pulse:  [] 87  Resp:  [18-20] 18  SpO2:  [96 %-100 %] 97 %  BP: (138-148)/(65-74) 148/74     Weight: 94.8 kg (208 lb 15.9 oz)  Body mass index is 32.25 kg/m².    Intake/Output Summary (Last 24 hours) at 1/30/2023 1015  Last data filed at 1/29/2023 1844  Gross per 24 hour   Intake --   Output 150 ml   Net -150 ml      Physical Exam  HENT:      Head: Normocephalic.      Right Ear: External ear normal.      Left Ear: External ear normal.      Nose: Nose normal.      Mouth/Throat:      Mouth: Mucous membranes are moist.   Eyes:      Pupils: Pupils are equal, round, and reactive to light.   Cardiovascular:      Rate and Rhythm: Normal rate.      Pulses: Normal pulses.   Pulmonary:      Effort: Pulmonary effort is normal.   Abdominal:      General: Abdomen is flat.      Tenderness: There is abdominal tenderness.      Comments: Laparotomy and Ostomy sites clear clean and intact  solid output per ileostomy    Skin:     General: Skin is warm.   Neurological:       Mental Status: She is alert.      Comments: Follows all commands  Minimal responses to questions but appropriate   Psychiatric:      Comments: Some confusion        Significant Labs: All pertinent labs within the past 24 hours have been reviewed.  BMP:   Recent Labs   Lab 01/30/23  0330   *   *   K 4.7   CL 97   CO2 22*   BUN 65*   CREATININE 5.3*   CALCIUM 8.2*     CBC:   Recent Labs   Lab 01/29/23  0350 01/30/23  0330   WBC 14.85* 14.43*   HGB 6.7* 6.5*   HCT 20.0* 19.3*    312     CMP:   Recent Labs   Lab 01/29/23  0350 01/30/23  0330   * 129*   K 4.3 4.7   CL 97 97   CO2 25 22*   * 199*   BUN 34* 65*   CREATININE 3.6* 5.3*   CALCIUM 8.0* 8.2*   PROT 6.3 6.4   ALBUMIN 1.8* 1.7*   BILITOT 0.2 0.2   ALKPHOS 80 72   AST 25 19   ALT 68* 43   ANIONGAP 10 10       Significant Imaging: I have reviewed all pertinent imaging results/findings within the past 24 hours.

## 2023-01-30 NOTE — PT/OT/SLP PROGRESS
Occupational Therapy   Treatment    Name: Lily Green  MRN: [id number]  Admitting Diagnosis:  Problem with vascular access  2 Days Post-Op    Recommendations:     Discharge Recommendations: other (see comments) (LTACH vs SNF; post acute therapy)  Discharge Equipment Recommendations:  other (see comments) (TBD)  Barriers to discharge:  Other (Comment) (decreased occupational performance; decreased mentation)    Assessment:     Lily Green is a 72 y.o. female with a medical diagnosis of Problem with vascular access.  She presents with ..The primary encounter diagnosis was Problem with vascular access. Diagnoses of ESRD (end stage renal disease), Complication associated with dialysis catheter, and ATN (acute tubular necrosis) were also pertinent to this visit.  . Performance deficits affecting function are weakness, impaired endurance, impaired cognition, impaired sensation, decreased coordination, visual deficits, impaired self care skills, impaired functional mobility, gait instability, impaired balance, pain, decreased safety awareness, decreased lower extremity function, decreased upper extremity function, impaired skin.     Continue POC. This OTR (different from evaluating OTR) added additional functional mobility / balance goals to care plan to advance outcomes. Oriented to self and place and recalled name of this OT from recent hospitalization. Requires supportive encouraging cues to make eye contact and use her voice. Responsive to simple commands with visual demonstrations and calming voice/therapist lowering body to eye level to reduce patient's presentation of anxiety. Bed mobility at min assist. Tolerated bed to chair to bed with stepping cues and handheld min assist. Therapy session limited 2/2 transport to dialysis. Recommend post acute therapy upon medically stable.    Rehab Prognosis:  Good; patient would benefit from acute skilled OT services to address these deficits and reach  "maximum level of function.       Plan:     Patient to be seen 1 x/week to address the above listed problems via self-care/home management, therapeutic activities, therapeutic exercises  Plan of Care Expires: 02/28/23  Plan of Care Reviewed with: patient    Subjective     Pain/Comfort:  Pain Rating 1: other (see comments) (indicates min neuropathy pain in B feet)  Pain Addressed 1: Pre-medicate for activity, Distraction, Cessation of Activity, Nurse notified  Pain Rating Post-Intervention 1: other (see comments) (indicates min discomfort in pain)    Objective:     Communicated with: nursing prior to session.  Patient found HOB elevated with colostomy, bed alarm, Other (comments), telemetry, peripheral IV (telesitter) upon OT entry to room.    General Precautions: Standard, fall    Orthopedic Precautions:N/A  Braces: N/A  Respiratory Status: Room air     Occupational Performance:     Bed Mobility:    Patient completed Supine to Sit with minimum assistance  Patient completed Sit to Supine with minimum assistance   Increased time, one step direction simple cues (visual and verbal ie "leg over" while pointing where to place LE, "hand here", etc); extra caution to avoid excessive forward flexion 2/2 ostomy    Functional Mobility/Transfers:  Patient completed Sit <> Stand Transfer with minimum assistance  with  hand-held assist   Patient completed Bed <> Chair Transfer using Step Transfer technique with minimum assistance with hand-held assist  Functional Mobility: 3 steps from bed < > chair < > bed with handheld assist, min assist for balance, and calm, directive cues. OT cueing patient throughout session and mobility effort to maintain eye contact with therapist providing  approach and simple, one step direction cues    [facility] 6 Click ADL: 15    Treatment & Education:  Patient with gaze straight ahead. Verbal directive cues to facilitate patient to direct gaze at therapist. Alert and oriented to self, location "I am " "at [facility]", and with increased time recalled name of this therapist (therapist familiar to patient from prior hospitalization). Guided for directive one step directions as above for functional mobility to / from bed to chair (see above). Transport services entered room thus unable to facilitate planned ADL training and instead assisted for return to bed for transport to dialysis.    Patient left HOB elevated with all lines intact, call button in reach, nursing notified, and transport present    GOALS:   Multidisciplinary Problems       Occupational Therapy Goals          Problem: Occupational Therapy    Goal Priority Disciplines Outcome Interventions   Occupational Therapy Goal     OT, PT/OT Ongoing, Progressing    Description: Goals to be met by: 02/29/23    Patient will increase functional independence with ADLs by performing:    UE Dressing with Set-up Assistance.  LE Dressing with Set-up Assistance.  Grooming while standing at sink with Stand-by Assistance.  Upper extremity exercise program 3x 15 reps per handout, with supervision.    Add goal 1/30/2023  Tolerate standing with supervision x60 seconds without LOB to improve balance during LB dressing/clothing management  Functional mobility to bathroom with least restrictive device and transfer on / off commode with CGA                         Time Tracking:     OT Date of Treatment: 01/30/23  OT Start Time: 0920  OT Stop Time: 0946  OT Total Time (min): 26 min    Billable Minutes:Therapeutic Activity 26 min    OT/RICHARD: OT          1/30/2023  "

## 2023-01-30 NOTE — PLAN OF CARE
Continue POC. This OTR (different from evaluating OTR) added additional functional mobility / balance goals to care plan to advance outcomes. Oriented to self and place and recalled name of this OT from recent hospitalization. Requires supportive encouraging cues to make eye contact and use her voice. Responsive to simple commands with visual demonstrations and calming voice/therapist lowering body to eye level to reduce patient's presentation of anxiety. Bed mobility at min assist. Tolerated bed to chair to bed with stepping cues and handheld min assist. Therapy session limited 2/2 transport to dialysis. Recommend post acute therapy upon medically stable. See detailed note to follow.    Problem: Occupational Therapy  Goal: Occupational Therapy Goal  Description: Goals to be met by: 02/29/23    Patient will increase functional independence with ADLs by performing:    UE Dressing with Set-up Assistance.  LE Dressing with Set-up Assistance.  Grooming while standing at sink with Stand-by Assistance.  Upper extremity exercise program 3x 15 reps per handout, with supervision.    Add goal 1/30/2023  Tolerate standing with supervision x60 seconds without LOB to improve balance during LB dressing/clothing management  Functional mobility to bathroom with least restrictive device and transfer on / off commode with CGA    Outcome: Ongoing, Progressing

## 2023-01-30 NOTE — NURSING
At this time this nurse writer pages physician on-call Dr. Miramontes and informs of pt coughing up thick yellow mucous as well as pt c/o neuropathy pain to bilateral lower extremities; PRN Norco 5/325 PO administered prior for pain; new orders for dextromethorphan-guaifenesin syrup administered at this time; nursing continuing to monitor pt this shift; call light in reach.

## 2023-01-30 NOTE — PROGRESS NOTES
[facility] Medicine  Progress Note    Patient Name: Lily Green  MRN: [id number]  Patient Class: OP- Observation   Admission Date: 1/27/2023  Length of Stay: 0 days  Attending Physician: Eduar Albarran III, MD  Primary Care Provider: Pavel Calixto MD        Subjective:     Principal Problem:Problem with vascular access        HPI:  Patient is a 71 y/o female with PMHx of diabetes type 2 (treated with metformin), HTN, and celiac disease (following a strict diet), Acute kidney failure on HD, ischemic colitis s/p total cholectomy who represented to [facility] one day after discharge due to issues with her tunneeled HD catherter while in LTAC.  On previous admission patient was noted to have severe peritonitis secondary to ischemic bowel necrosis.  Underwent total colectomy.  Subsequently developed brisk postop bleed with severe blood loss.  Bleeding was stopped with subsequent ex lap however patient developed worsening ATN and was initiated on CRRT at the time.  When patient was hemodynamically stable, received tunnel cath placement on 01/25/2023.  Initial HD run using tunneled catheter was stopped early due to blockage.  It was suspected that time to be from vasospasm.  Subsequent HD was performed successfully on 01/26/2023 and patient was discharged to LTAC to continue Monday Wednesday Friday dialysis, PTOT and to finish 14 day course of antibiotics.  While at [facility], patient was clinically stable however when dialysis was attempted on Friday 01/27/2023, line was noted to be blocked and clotted.  Nephrology at [facility] recommended patient to return to [facility] for line revision.    Upon arrival to the ED, the patient appears clinically stable but chronically ill.  Mentation and clinical status at baseline per family in the room consistent with patient's status at discharge from hospital 1 day prior.  Labs drawn in ED did not reveal emergent need for dialysis.  Electrolytes and kidney function appears  stable from previous labs.  However, patient's hemoglobin noted to be 6.8 down from 8.5 yesterday.  Patient has no obvious sources of bleeding at this time.  Is hemodynamically stable.  Patient was typed and screened and consented however no blood products were given until dialysis access could be established.  Nephrology was consulted and recommended to fix access in the morning as patient does not have emergent need for dialysis at this time.  Patient was admitted to [facility] family Medicine for replacement of tunneled dialysis line and asymptomatic anemia.      Overview/Hospital Course:  No notes on file    Interval History: NAEON    Review of Systems   Reason unable to perform ROS: Limited responses to questions.   Constitutional:  Positive for fatigue. Negative for fever.   HENT:  Negative for congestion and sore throat.    Eyes:  Negative for visual disturbance.   Respiratory:  Negative for apnea, shortness of breath and wheezing.    Cardiovascular:  Negative for chest pain.   Gastrointestinal:  Negative for nausea and vomiting.   Genitourinary:  Negative for dysuria.   Musculoskeletal:  Negative for arthralgias and back pain.   Neurological:  Negative for dizziness and headaches.        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    Objective:     Vital Signs (Most Recent):  Temp: 97.3 °F (36.3 °C) (01/30/23 0751)  Pulse: 87 (01/30/23 0751)  Resp: 18 (01/30/23 0751)  BP: (!) 148/74 (01/30/23 0751)  SpO2: 97 % (01/30/23 0751)   Vital Signs (24h Range):  Temp:  [96.8 °F (36 °C)-99.8 °F (37.7 °C)] 97.3 °F (36.3 °C)  Pulse:  [] 87  Resp:  [18-20] 18  SpO2:  [96 %-100 %] 97 %  BP: (138-148)/(65-74) 148/74     Weight: 94.8 kg (208 lb 15.9 oz)  Body mass index is 32.25 kg/m².    Intake/Output Summary (Last 24 hours) at 1/30/2023 1015  Last data filed at 1/29/2023 1844  Gross per 24 hour   Intake --   Output 150 ml   Net -150 ml      Physical Exam  HENT:      Head:  Normocephalic.      Right Ear: External ear normal.      Left Ear: External ear normal.      Nose: Nose normal.      Mouth/Throat:      Mouth: Mucous membranes are moist.   Eyes:      Pupils: Pupils are equal, round, and reactive to light.   Cardiovascular:      Rate and Rhythm: Normal rate.      Pulses: Normal pulses.   Pulmonary:      Effort: Pulmonary effort is normal.   Abdominal:      General: Abdomen is flat.      Tenderness: There is abdominal tenderness.      Comments: Laparotomy and Ostomy sites clear clean and intact  solid output per ileostomy    Skin:     General: Skin is warm.   Neurological:      Mental Status: She is alert.      Comments: Follows all commands  Minimal responses to questions but appropriate   Psychiatric:      Comments: Some confusion        Significant Labs: All pertinent labs within the past 24 hours have been reviewed.  BMP:   Recent Labs   Lab 01/30/23  0330   *   *   K 4.7   CL 97   CO2 22*   BUN 65*   CREATININE 5.3*   CALCIUM 8.2*     CBC:   Recent Labs   Lab 01/29/23  0350 01/30/23  0330   WBC 14.85* 14.43*   HGB 6.7* 6.5*   HCT 20.0* 19.3*    312     CMP:   Recent Labs   Lab 01/29/23  0350 01/30/23  0330   * 129*   K 4.3 4.7   CL 97 97   CO2 25 22*   * 199*   BUN 34* 65*   CREATININE 3.6* 5.3*   CALCIUM 8.0* 8.2*   PROT 6.3 6.4   ALBUMIN 1.8* 1.7*   BILITOT 0.2 0.2   ALKPHOS 80 72   AST 25 19   ALT 68* 43   ANIONGAP 10 10       Significant Imaging: I have reviewed all pertinent imaging results/findings within the past 24 hours.      Assessment/Plan:      * Problem with vascular access  Access non functional in HD  S/p removal of Right IJ line  Left IJ line placed  Plan  Has received 1 round of dialysis  Second round of dialysis to occur today      Primary hypertension  Continue amlodipine      Hyponatremia  Pt remains hyponatremic, potentially 2/2 to volume overload, missed HD    Plan  Follow nephro Recs  Fix HD access to resume HD      ATN  (acute tubular necrosis)  Likely 2/2 to shock from prior admission  Tunnel cath placed 1/25/23  Line became blocked at HD on 1/25/23 and 1/27/23    Plan  -Line replaced  -Strict I&Os,maintain euvolemia   -Avoid renal toxic meds   -Continue to monitor renal status and urine output   -Nephro consulted. Appreciate recs. HD resumed on 1/28/23   - Will remain in patient for a second round of HD  - Requires 1 unit Rbcs due to anemia      Large bowel ischemia  #Gram negative Bacteremia  -CT Abd shows possible hematoma but no signs fo acute infection or abscess   - Gram negative rods reported in Blood culture from 1/13  WBC improved to 43 form 45 1/22/23    Plan  - Continue Cefepime, flagyl for total of 14 day course End date 1/28/23  -  All further cultures on 1/17 w/ NGT  - afebrile  - PT/OT        Idiopathic neuropathy  Holding home lyrica due to kidney function  Plan  Discuss alternative treatment options with Nephro  PRN cold water foot path      Type 2 diabetes mellitus with diabetic polyneuropathy, without long-term current use of insulin  SSI  Detemir 7U nightly  POCT BG QID with meals      VTE Risk Mitigation (From admission, onward)         Ordered     heparin (porcine) injection 2,400 Units  As needed (PRN)         01/27/23 2304                Discharge Planning   CAMPBELL:      Code Status: Full Code   Is the patient medically ready for discharge?:     Reason for patient still in hospital (select all that apply): Patient trending condition, Treatment and Pending disposition               Bal Burr MD  [facility] [facility] PGY-2  01/30/2023

## 2023-01-30 NOTE — PLAN OF CARE
GUEVARA contacted pts sister to discuss dc planning. Pts sister confirmed information on chart. Pt resides in home alone. Per pts sister pt has been caring for her  who recently moved back in. Pt was independent before coming into hospital. Pt has no dme/hh services. Upon dc pt will return to [facility]. Pts sister confirmed that she is agreeable for pt to dc back to [facility]. SW provided pts sister with her contact information for any questions or concerns. SW will continue to follow pt through her transitions of care and assist with any dc needs.      Per Mylene with [facility], there willing to accept pt back into [facility]. Mylene reported that she will submit for insurance auth today.     Pending auth at this time.     Future Appointments   Date Time Provider Department Center   2/13/2023  2:20 PM Ej Mitchell MD [facility] [id number] [place]   2/20/2023  2:15 PM Raz Serrano MD [facility] [place]   4/4/2023  2:20 PM Lucas Bryan DPM [facility]OD [place]   5/4/2023 11:30 AM Pavel Calixto MD [facility] [place]        01/30/23 1204   Discharge Assessment   Assessment Type Discharge Planning Assessment   Confirmed/corrected address, phone number and insurance Yes   Confirmed Demographics Correct on Facesheet   Source of Information family   Communicated CAMPBELL with patient/caregiver Yes   Reason For Admission Problem with vascular access   People in Home spouse   Do you expect to return to your current living situation? Yes   Do you have help at home or someone to help you manage your care at home? Yes   Who are your caregiver(s) and their phone number(s)? RabiaSweetie owen (Sister)   [phone]   Prior to hospitilization cognitive status: Unable to Assess   Current cognitive status: Unable to Assess   Home Layout Able to live on 1st floor   Equipment Currently Used at Home none   Patient currently being followed by outpatient case management? No   Do you currently have service(s) that  help you manage your care at home? No   Do you take prescription medications? Yes   Do you have prescription coverage? Yes   Coverage [employer]   Do you have any problems affording any of your prescribed medications? No   Is the patient taking medications as prescribed? yes   How do you get to doctors appointments? family or friend will provide;car, drives self   Are you on dialysis? Yes   Discharge Plan A Long-term acute care facility (LTAC)   DME Needed Upon Discharge  none   Discharge Plan discussed with: Sibling   Name(s) and Number(s) Sweetie Saha (Sister)   [phone]   Discharge Barriers Identified None   OTHER   Name(s) of People in Home

## 2023-01-30 NOTE — PLAN OF CARE
Patient received on   2 Lpm NC with SpO2    98%. Pt with no apparent distress noted. Will continue to monitor.

## 2023-01-30 NOTE — PLAN OF CARE
Problem: Physical Therapy  Goal: Physical Therapy Goal  Description: Goals to be met by: 23     Patient will increase functional independence with mobility by performin. Supine to sit with Stand-by Assistance  2. Sit to supine with Stand-by Assistance  3. Sit to stand transfer with Stand-by Assistance  4. Bed to chair transfer with Stand-by Assistance using LRAD.  5. Gait  x 25 feet with Contact Guard Assistance using LRAD.     Outcome: Ongoing, Progressing   Continue working toward goals.

## 2023-01-31 LAB
ALBUMIN SERPL BCP-MCNC: 2 G/DL (ref 3.5–5.2)
ALP SERPL-CCNC: 70 U/L (ref 55–135)
ALT SERPL W/O P-5'-P-CCNC: 30 U/L (ref 10–44)
ANION GAP SERPL CALC-SCNC: 11 MMOL/L (ref 8–16)
AST SERPL-CCNC: 20 U/L (ref 10–40)
BASOPHILS # BLD AUTO: 0.17 K/UL (ref 0–0.2)
BASOPHILS NFR BLD: 1.2 % (ref 0–1.9)
BILIRUB SERPL-MCNC: 0.4 MG/DL (ref 0.1–1)
BUN SERPL-MCNC: 50 MG/DL (ref 8–23)
CALCIUM SERPL-MCNC: 8.9 MG/DL (ref 8.7–10.5)
CHLORIDE SERPL-SCNC: 97 MMOL/L (ref 95–110)
CO2 SERPL-SCNC: 24 MMOL/L (ref 23–29)
CREAT SERPL-MCNC: 4.6 MG/DL (ref 0.5–1.4)
DIFFERENTIAL METHOD: ABNORMAL
EOSINOPHIL # BLD AUTO: 0.1 K/UL (ref 0–0.5)
EOSINOPHIL NFR BLD: 0.7 % (ref 0–8)
ERYTHROCYTE [DISTWIDTH] IN BLOOD BY AUTOMATED COUNT: 19.5 % (ref 11.5–14.5)
EST. GFR  (NO RACE VARIABLE): 10 ML/MIN/1.73 M^2
GLUCOSE SERPL-MCNC: 150 MG/DL (ref 70–110)
HCT VFR BLD AUTO: 25 % (ref 37–48.5)
HGB BLD-MCNC: 8.1 G/DL (ref 12–16)
IMM GRANULOCYTES # BLD AUTO: 0.42 K/UL (ref 0–0.04)
IMM GRANULOCYTES NFR BLD AUTO: 2.9 % (ref 0–0.5)
LYMPHOCYTES # BLD AUTO: 2 K/UL (ref 1–4.8)
LYMPHOCYTES NFR BLD: 13.3 % (ref 18–48)
MCH RBC QN AUTO: 28.7 PG (ref 27–31)
MCHC RBC AUTO-ENTMCNC: 32.4 G/DL (ref 32–36)
MCV RBC AUTO: 89 FL (ref 82–98)
MONOCYTES # BLD AUTO: 2.2 K/UL (ref 0.3–1)
MONOCYTES NFR BLD: 14.9 % (ref 4–15)
NEUTROPHILS # BLD AUTO: 9.9 K/UL (ref 1.8–7.7)
NEUTROPHILS NFR BLD: 67 % (ref 38–73)
NRBC BLD-RTO: 0 /100 WBC
PLATELET # BLD AUTO: 298 K/UL (ref 150–450)
PMV BLD AUTO: 10.3 FL (ref 9.2–12.9)
POCT GLUCOSE: 165 MG/DL (ref 70–110)
POCT GLUCOSE: 280 MG/DL (ref 70–110)
POCT GLUCOSE: 309 MG/DL (ref 70–110)
POCT GLUCOSE: 330 MG/DL (ref 70–110)
POTASSIUM SERPL-SCNC: 4.8 MMOL/L (ref 3.5–5.1)
PROT SERPL-MCNC: 7.3 G/DL (ref 6–8.4)
RBC # BLD AUTO: 2.82 M/UL (ref 4–5.4)
SODIUM SERPL-SCNC: 132 MMOL/L (ref 136–145)
WBC # BLD AUTO: 14.67 K/UL (ref 3.9–12.7)

## 2023-01-31 PROCEDURE — 97530 THERAPEUTIC ACTIVITIES: CPT | Mod: CQ

## 2023-01-31 PROCEDURE — 85025 COMPLETE CBC W/AUTO DIFF WBC: CPT | Performed by: STUDENT IN AN ORGANIZED HEALTH CARE EDUCATION/TRAINING PROGRAM

## 2023-01-31 PROCEDURE — 97116 GAIT TRAINING THERAPY: CPT | Mod: CQ

## 2023-01-31 PROCEDURE — 36415 COLL VENOUS BLD VENIPUNCTURE: CPT | Performed by: STUDENT IN AN ORGANIZED HEALTH CARE EDUCATION/TRAINING PROGRAM

## 2023-01-31 PROCEDURE — 25000003 PHARM REV CODE 250: Performed by: STUDENT IN AN ORGANIZED HEALTH CARE EDUCATION/TRAINING PROGRAM

## 2023-01-31 PROCEDURE — G0378 HOSPITAL OBSERVATION PER HR: HCPCS

## 2023-01-31 PROCEDURE — 94799 UNLISTED PULMONARY SVC/PX: CPT

## 2023-01-31 PROCEDURE — 80053 COMPREHEN METABOLIC PANEL: CPT | Performed by: STUDENT IN AN ORGANIZED HEALTH CARE EDUCATION/TRAINING PROGRAM

## 2023-01-31 PROCEDURE — 99900035 HC TECH TIME PER 15 MIN (STAT)

## 2023-01-31 RX ORDER — LIDOCAINE AND PRILOCAINE 25; 25 MG/G; MG/G
CREAM TOPICAL ONCE
Status: COMPLETED | OUTPATIENT
Start: 2023-01-31 | End: 2023-01-31

## 2023-01-31 RX ADMIN — HYDROCODONE BITARTRATE AND ACETAMINOPHEN 1 TABLET: 5; 325 TABLET ORAL at 09:01

## 2023-01-31 RX ADMIN — Medication 9 MG: at 09:01

## 2023-01-31 RX ADMIN — LOPERAMIDE HYDROCHLORIDE 2 MG: 2 CAPSULE ORAL at 09:01

## 2023-01-31 RX ADMIN — HYDROCODONE BITARTRATE AND ACETAMINOPHEN 1 TABLET: 5; 325 TABLET ORAL at 06:01

## 2023-01-31 RX ADMIN — AMLODIPINE BESYLATE 5 MG: 5 TABLET ORAL at 09:01

## 2023-01-31 RX ADMIN — INSULIN ASPART 4 UNITS: 100 INJECTION, SOLUTION INTRAVENOUS; SUBCUTANEOUS at 09:01

## 2023-01-31 RX ADMIN — INSULIN ASPART 8 UNITS: 100 INJECTION, SOLUTION INTRAVENOUS; SUBCUTANEOUS at 04:01

## 2023-01-31 RX ADMIN — LIDOCAINE AND PRILOCAINE: 25; 25 CREAM TOPICAL at 05:01

## 2023-01-31 RX ADMIN — PANTOPRAZOLE SODIUM 40 MG: 40 TABLET, DELAYED RELEASE ORAL at 09:01

## 2023-01-31 NOTE — ASSESSMENT & PLAN NOTE
Likely 2/2 to shock from prior admission  Tunnel cath placed 1/25/23  Line became blocked at HD on 1/25/23 and 1/27/23    Plan  -Line replaced  -Strict I&Os,maintain euvolemia   -Avoid renal toxic meds   -Continue to monitor renal status and urine output   -Nephro consulted. Appreciate recs. HD resumed on 1/28/23  - Awaiting authorization to return to LTAC

## 2023-01-31 NOTE — SUBJECTIVE & OBJECTIVE
Interval History: NAEON    Review of Systems   Reason unable to perform ROS: Limited responses to questions.   Constitutional:  Positive for fatigue. Negative for fever.   HENT:  Negative for congestion and sore throat.    Eyes:  Negative for visual disturbance.   Respiratory:  Negative for apnea, shortness of breath and wheezing.    Cardiovascular:  Negative for chest pain.   Gastrointestinal:  Negative for nausea and vomiting.   Genitourinary:  Negative for dysuria.   Musculoskeletal:  Negative for arthralgias and back pain.   Neurological:  Negative for dizziness and headaches.        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    Objective:     Vital Signs (Most Recent):  Temp: 97.9 °F (36.6 °C) (01/31/23 0717)  Pulse: 87 (01/31/23 0717)  Resp: 18 (01/31/23 0717)  BP: (!) 147/72 (01/31/23 0717)  SpO2: 100 % (01/31/23 0745)   Vital Signs (24h Range):  Temp:  [96.3 °F (35.7 °C)-100 °F (37.8 °C)] 97.9 °F (36.6 °C)  Pulse:  [] 87  Resp:  [16-18] 18  SpO2:  [96 %-100 %] 100 %  BP: ()/(67-86) 147/72     Weight: 94.8 kg (208 lb 15.9 oz)  Body mass index is 32.25 kg/m².    Intake/Output Summary (Last 24 hours) at 1/31/2023 0857  Last data filed at 1/30/2023 1736  Gross per 24 hour   Intake 1300 ml   Output 3470 ml   Net -2170 ml      Physical Exam  HENT:      Head: Normocephalic.      Right Ear: External ear normal.      Left Ear: External ear normal.      Nose: Nose normal.      Mouth/Throat:      Mouth: Mucous membranes are moist.   Eyes:      Pupils: Pupils are equal, round, and reactive to light.   Cardiovascular:      Rate and Rhythm: Normal rate.      Pulses: Normal pulses.   Pulmonary:      Effort: Pulmonary effort is normal.   Abdominal:      General: Abdomen is flat.      Tenderness: There is abdominal tenderness.      Comments: Laparotomy and Ostomy sites clear clean and intact  solid output per ileostomy    Skin:     General: Skin is warm.    Neurological:      Mental Status: She is alert.      Comments: Follows all commands  Minimal responses to questions but appropriate   Psychiatric:      Comments: Some confusion        Significant Labs: All pertinent labs within the past 24 hours have been reviewed.  BMP:   Recent Labs   Lab 01/31/23  0630   *   *   K 4.8   CL 97   CO2 24   BUN 50*   CREATININE 4.6*   CALCIUM 8.9     CBC:   Recent Labs   Lab 01/30/23  0330 01/31/23  0630   WBC 14.43* 14.67*   HGB 6.5* 8.1*   HCT 19.3* 25.0*    298     CMP:   Recent Labs   Lab 01/30/23  0330 01/31/23  0630   * 132*   K 4.7 4.8   CL 97 97   CO2 22* 24   * 150*   BUN 65* 50*   CREATININE 5.3* 4.6*   CALCIUM 8.2* 8.9   PROT 6.4 7.3   ALBUMIN 1.7* 2.0*   BILITOT 0.2 0.4   ALKPHOS 72 70   AST 19 20   ALT 43 30   ANIONGAP 10 11       Significant Imaging: I have reviewed all pertinent imaging results/findings within the past 24 hours.

## 2023-01-31 NOTE — PROGRESS NOTES
[facility] Medicine  Progress Note    Patient Name: Lily Green  MRN: [id number]  Patient Class: OP- Observation   Admission Date: 1/27/2023  Length of Stay: 0 days  Attending Physician: Eduar Albarran III, MD  Primary Care Provider: Pavel Calixto MD        Subjective:     Principal Problem:Problem with vascular access        HPI:  Patient is a 73 y/o female with PMHx of diabetes type 2 (treated with metformin), HTN, and celiac disease (following a strict diet), Acute kidney failure on HD, ischemic colitis s/p total cholectomy who represented to [facility] one day after discharge due to issues with her tunneeled HD catherter while in LTAC.  On previous admission patient was noted to have severe peritonitis secondary to ischemic bowel necrosis.  Underwent total colectomy.  Subsequently developed brisk postop bleed with severe blood loss.  Bleeding was stopped with subsequent ex lap however patient developed worsening ATN and was initiated on CRRT at the time.  When patient was hemodynamically stable, received tunnel cath placement on 01/25/2023.  Initial HD run using tunneled catheter was stopped early due to blockage.  It was suspected that time to be from vasospasm.  Subsequent HD was performed successfully on 01/26/2023 and patient was discharged to LTAC to continue Monday Wednesday Friday dialysis, PTOT and to finish 14 day course of antibiotics.  While at [facility], patient was clinically stable however when dialysis was attempted on Friday 01/27/2023, line was noted to be blocked and clotted.  Nephrology at [facility] recommended patient to return to [facility] for line revision.    Upon arrival to the ED, the patient appears clinically stable but chronically ill.  Mentation and clinical status at baseline per family in the room consistent with patient's status at discharge from hospital 1 day prior.  Labs drawn in ED did not reveal emergent need for dialysis.  Electrolytes and kidney function appears  stable from previous labs.  However, patient's hemoglobin noted to be 6.8 down from 8.5 yesterday.  Patient has no obvious sources of bleeding at this time.  Is hemodynamically stable.  Patient was typed and screened and consented however no blood products were given until dialysis access could be established.  Nephrology was consulted and recommended to fix access in the morning as patient does not have emergent need for dialysis at this time.  Patient was admitted to [facility] for replacement of tunneled dialysis line and asymptomatic anemia.      Overview/Hospital Course:  No notes on file    Interval History: NAEON    Review of Systems   Reason unable to perform ROS: Limited responses to questions.   Constitutional:  Positive for fatigue. Negative for fever.   HENT:  Negative for congestion and sore throat.    Eyes:  Negative for visual disturbance.   Respiratory:  Negative for apnea, shortness of breath and wheezing.    Cardiovascular:  Negative for chest pain.   Gastrointestinal:  Negative for nausea and vomiting.   Genitourinary:  Negative for dysuria.   Musculoskeletal:  Negative for arthralgias and back pain.   Neurological:  Negative for dizziness and headaches.        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    Objective:     Vital Signs (Most Recent):  Temp: 97.9 °F (36.6 °C) (01/31/23 0717)  Pulse: 87 (01/31/23 0717)  Resp: 18 (01/31/23 0717)  BP: (!) 147/72 (01/31/23 0717)  SpO2: 100 % (01/31/23 0745)   Vital Signs (24h Range):  Temp:  [96.3 °F (35.7 °C)-100 °F (37.8 °C)] 97.9 °F (36.6 °C)  Pulse:  [] 87  Resp:  [16-18] 18  SpO2:  [96 %-100 %] 100 %  BP: ()/(67-86) 147/72     Weight: 94.8 kg (208 lb 15.9 oz)  Body mass index is 32.25 kg/m².    Intake/Output Summary (Last 24 hours) at 1/31/2023 0857  Last data filed at 1/30/2023 1736  Gross per 24 hour   Intake 1300 ml   Output 3470 ml   Net -2170 ml      Physical Exam  HENT:       Head: Normocephalic.      Right Ear: External ear normal.      Left Ear: External ear normal.      Nose: Nose normal.      Mouth/Throat:      Mouth: Mucous membranes are moist.   Eyes:      Pupils: Pupils are equal, round, and reactive to light.   Cardiovascular:      Rate and Rhythm: Normal rate.      Pulses: Normal pulses.   Pulmonary:      Effort: Pulmonary effort is normal.   Abdominal:      General: Abdomen is flat.      Tenderness: There is abdominal tenderness.      Comments: Laparotomy and Ostomy sites clear clean and intact  solid output per ileostomy    Skin:     General: Skin is warm.   Neurological:      Mental Status: She is alert.      Comments: Follows all commands  Minimal responses to questions but appropriate   Psychiatric:      Comments: Some confusion        Significant Labs: All pertinent labs within the past 24 hours have been reviewed.  BMP:   Recent Labs   Lab 01/31/23  0630   *   *   K 4.8   CL 97   CO2 24   BUN 50*   CREATININE 4.6*   CALCIUM 8.9     CBC:   Recent Labs   Lab 01/30/23  0330 01/31/23  0630   WBC 14.43* 14.67*   HGB 6.5* 8.1*   HCT 19.3* 25.0*    298     CMP:   Recent Labs   Lab 01/30/23  0330 01/31/23  0630   * 132*   K 4.7 4.8   CL 97 97   CO2 22* 24   * 150*   BUN 65* 50*   CREATININE 5.3* 4.6*   CALCIUM 8.2* 8.9   PROT 6.4 7.3   ALBUMIN 1.7* 2.0*   BILITOT 0.2 0.4   ALKPHOS 72 70   AST 19 20   ALT 43 30   ANIONGAP 10 11       Significant Imaging: I have reviewed all pertinent imaging results/findings within the past 24 hours.      Assessment/Plan:      * Problem with vascular access  Access non functional in HD  S/p removal of Right IJ line  Left IJ line placed  Plan  Has received 1 round of dialysis  Second round of dialysis to occur today      Primary hypertension  Continue amlodipine      Hyponatremia  Pt remains hyponatremic, potentially 2/2 to volume overload, missed HD    Plan  Follow nephro Recs  Fix HD access to resume HD      ATN  (acute tubular necrosis)  Likely 2/2 to shock from prior admission  Tunnel cath placed 1/25/23  Line became blocked at HD on 1/25/23 and 1/27/23    Plan  -Line replaced  -Strict I&Os,maintain euvolemia   -Avoid renal toxic meds   -Continue to monitor renal status and urine output   -Nephro consulted. Appreciate recs. HD resumed on 1/28/23  - Awaiting authorization to return to LTAC      Large bowel ischemia  #Gram negative Bacteremia  -CT Abd shows possible hematoma but no signs fo acute infection or abscess   - Gram negative rods reported in Blood culture from 1/13  WBC improved to 43 form 45 1/22/23    Plan  - Continue Cefepime, flagyl for total of 14 day course End date 1/28/23  -  All further cultures on 1/17 w/ NGT  - afebrile  - PT/OT        Idiopathic neuropathy  Holding home lyrica due to kidney function  Plan  Discuss alternative treatment options with Nephro  PRN cold water foot path      Type 2 diabetes mellitus with diabetic polyneuropathy, without long-term current use of insulin  SSI  Detemir 7U nightly  POCT BG QID with meals      VTE Risk Mitigation (From admission, onward)         Ordered     heparin (porcine) injection 2,400 Units  As needed (PRN)         01/27/23 2304                Discharge Planning   CAMPBELL: 1/31/2023     Code Status: Full Code   Is the patient medically ready for discharge?:     Reason for patient still in hospital (select all that apply): Pending disposition  Discharge Plan A: Long-term acute care facility (LTAC)          Bal Burr MD  [facility] PGY-2  01/31/2023

## 2023-01-31 NOTE — PROGRESS NOTES
[facility] Progress Note    Subjective:      Ms. Green is doing well today. No acute events overnight. No issues with dialysis yesterday. She is eating and drinking normally. Pending approval to return to LT.     Objective:   Last 24 Hour Vital Signs:  BP  Min: 99/67  Max: 147/72  Temp  Av.1 °F (36.7 °C)  Min: 96.3 °F (35.7 °C)  Max: 100 °F (37.8 °C)  Pulse  Av  Min: 87  Max: 117  Resp  Av.7  Min: 16  Max: 18  SpO2  Av.3 %  Min: 96 %  Max: 100 %  Weight  Av.8 kg (208 lb 15.9 oz)  Min: 94.8 kg (208 lb 15.9 oz)  Max: 94.8 kg (208 lb 15.9 oz)  I/O last 3 completed shifts:  In: 1300 [Blood:500; Other:800]  Out: 3470 [Other:3300; Stool:170]    Physical Examination:  Gen: awake, alert  HEENT: normocephalic, EOMI, MMM  CV: RRR, no murmurs   Lungs: CTAB, symmetric chest rise  GI: Soft, non tender, non distended  Extrem: no edema, clubbing, or cyanosis  Skin: dry, warm, intact.   Neuro: AAOx3, no focal deficits         Laboratory:  Recent Labs   Lab 23  0350 23  0330 23  0630   WBC 14.85* 14.43* 14.67*   HGB 6.7* 6.5* 8.1*   HCT 20.0* 19.3* 25.0*    312 298   * 129* 132*   K 4.3 4.7 4.8   CL 97 97 97   CREATININE 3.6* 5.3* 4.6*   BUN 34* 65* 50*   CO2 25 22* 24   ALT 68* 43 30   AST 25 19 20         Radiology:  Imaging has been reviewed personally.       Assessment and Plan:     Lily Green is a 72 y.o. woman with a history of celiac disease, DM2 well controlled, HTN, anxiety, and recent incident of necrotizing ischemic colitis s/p colectomy on . Developed ATN with refractory acidosis and started on HD  with second ischemic insult after hemorrhagic shock on . Was discharged  but came back in after malfunction of tunneled line.      Stage III Anuric HINA  - baseline creatinine 0.8  - Pt with ischemic colitis s/p colectomy  and another ischemic event  with hemorrhagic shock.  - ATN with 2 separate insults will likely take  several weeks to months to recover  - HD initiated 1/14  - tunneled line replaced 1/28 and functioning well  - continue MWF schedule  - renally dose all medications      Hyponatremia  - Most consistent with hypervolemic hyponatremia given anuria. Na improved 129 to 132 after HD yesterday      Hypocalcemia  - Calcium 8.9 and corrects higher for low albumin      Acute on chronic Anemia  - Hb ~ 6.5 yesterday s/p transfusion; now at 8  - epo with HD since she is still anuric and having prolonged recovery         Thank you for allowing us to participate in the care of this patient. Please call with any questions.       Elizabeth Mobley, DO  [facility]

## 2023-01-31 NOTE — PT/OT/SLP PROGRESS
Physical Therapy Treatment    Patient Name:  Lily Green   MRN:  [id number]    Recommendations:     Discharge Recommendations:  (post acute placement / therapy)  Discharge Equipment Recommendations:  (TBD)  Barriers to discharge:  decreased functional mobility/stability/strength    Assessment:     Lily Green is a 72 y.o. female admitted with a medical diagnosis of Problem with vascular access.  She presents with the following impairments/functional limitations: weakness, impaired endurance, impaired self care skills, impaired functional mobility, gait instability, impaired balance, impaired coordination, decreased coordination, decreased upper extremity function, decreased lower extremity function, decreased safety awareness Pt would continue to benefit from P.T. To address impairments listed above.  .    Rehab Prognosis: Fair; patient would benefit from acute skilled PT services to address these deficits and reach maximum level of function.    Recent Surgery: Procedure(s) (LRB):  Insertion,catheter,tunneled (Left)  REMOVAL, CATHETER (Right) 3 Days Post-Op    Plan:     During this hospitalization, patient to be seen 5 x/week to address the identified rehab impairments via gait training, therapeutic activities, therapeutic exercises, neuromuscular re-education and progress toward the following goals:    Plan of Care Expires:  02/28/23    Subjective     Patient/Family Comments/goals: Pt agreed to tx  Pain/Comfort:  Pain Rating 1:  (B feet painful to touch, neuropathy; did not rate)  Pain Rating Post-Intervention 1:  (as above)      Objective:     Communicated with RN prior to session.  Patient found supine with colostomy, telemetry, peripheral IV, bed alarm upon PT entry to room.     General Precautions: Standard, fall  Orthopedic Precautions: N/A  Braces: N/A  Respiratory Status: Room air     Functional Mobility:  Bed Mobility:     Rolling Left:  stand by assistance and contact guard  assistance with vc's and b/r for assist  Scooting: stand by assistance and to EOB   Supine to Sit: contact guard assistance  Sit to Supine: minimum assistance, moderate assistance, and BLEs onto EOb  Transfers:     Sit to Stand:  contact guard assistance and minimum assistance with rolling walker and x 3 reps  Gait: 60ft and 80ft with Rw and Dina/close CGA.  Pt requires assistance for RW management around objects and when turning.  Pt does not scan pathway in front and runs into objects with Rw.  No LOB, occasional unsteadiness when turning  Balance: sitting fair+, standing fair/fair-, gait fair-      AM-PAC 6 CLICK MOBILITY  Turning over in bed (including adjusting bedclothes, sheets and blankets)?: 3  Sitting down on and standing up from a chair with arms (e.g., wheelchair, bedside commode, etc.): 3  Moving from lying on back to sitting on the side of the bed?: 3  Moving to and from a bed to a chair (including a wheelchair)?: 3  Need to walk in hospital room?: 3  Climbing 3-5 steps with a railing?: 2  Basic Mobility Total Score: 17       Treatment & Education:  Pt was supine upon entering the room.  PTA assisted pt with donning two pairs of socks with Total A.  Pt's  reported that pt like her ankle socks first then non-skid socks secondary to her neuropathy.  Pt's  stated that this cushions her feet more and makes it less pain when walking. Pt sat EOB and performed BLE therex: AP, LAQ, hip flexion with vc's/tc's and occasional assist to initiate proper technique.  Pt did not response to question asked consistently, but when she did she answered appropriately.  Gait training as above with seated rest break between bouts secondary to decreased endurance.  Pt returned to bed supine with socks doffed and B heels floated off a pillow for pressure relief.     Patient left supine with all lines intact, call button in reach, bed alarm on, RN notified, and pt's  present..    GOALS:   Multidisciplinary  Problems       Physical Therapy Goals          Problem: Physical Therapy    Goal Priority Disciplines Outcome Goal Variances Interventions   Physical Therapy Goal     PT, PT/OT Ongoing, Progressing     Description: Goals to be met by: 23     Patient will increase functional independence with mobility by performin. Supine to sit with Stand-by Assistance  2. Sit to supine with Stand-by Assistance  3. Sit to stand transfer with Stand-by Assistance  4. Bed to chair transfer with Stand-by Assistance using LRAD.  5. Gait  x 25 feet with Contact Guard Assistance using LRAD.                          Time Tracking:     PT Received On: 23  PT Start Time: 1453     PT Stop Time: 1520  PT Total Time (min): 27 min     Billable Minutes: Gait Training 14 and Therapeutic Activity 13    Treatment Type: Treatment  PT/PTA: PTA     PTA Visit Number: 2     2023

## 2023-01-31 NOTE — PLAN OF CARE
SW was made aware by Mylene with [facility] that we are still pending auth at this time. SW will continue to follow pt throughout her transitions of care and assist with any dc needs.     Future Appointments   Date Time Provider Department Center   2/13/2023  2:20 PM Ej Mitchell MD [facility] [address]   2/20/2023  2:15 PM Raz Serrano MD [facility] GRP [place] [facility]   4/4/2023  2:20 PM Lucas Bryan DPM [facility] POD [place]   5/4/2023 11:30 AM Pavel Calixto MD [facility] PRICRE [place]        01/31/23 1229   Post-Acute Status   Post-Acute Authorization Placement

## 2023-01-31 NOTE — RESIDENT HANDOFF
Handoff     Primary Team: Networked reference to record [facility]  Room Number: K473/K473 A     Patient Name: Lily Green MRN: [id number]     Date of Birth: [id number] Allergies: Crestor [rosuvastatin] and Gluten protein     Age: 72 y.o. Admit Date: 1/27/2023     Sex: female  BMI: Body mass index is 32.25 kg/m².     Code Status: Full Code        Illness Level (current clinical status): Watcher - No    Reason for Admission: Problem with vascular access    Brief HPI (pertinent PMH and diagnosis or differential diagnosis): Need to replace vascualr access for continued HD. Patient has Renal failure 2/2 ATN from septic shock in setting of toxic megacolon    Procedure Date: 1/    Hospital Course (updated, brief assessment by system or problem, significant events): Patient admitted form LTAC for nonfunctional permacath. Patient had RIJ removed and LIJ line placed by gen surg. Patient is now s/p HD x2 through new line with no issues. Patient menation waxes and wanes with HD . Patient main complaint is neuropathy, former medications stopped due to poor renal function. Patient required 1 unit Prbc on this admission with appropriate response. Patient is stable for discharge back to LTAC pending approval.     Tasks (specific, using if-then statements): Discharge Readmit to [facility]    Contingency Plan (special circumstances anticipated and plan): If precipitous H/H drop CT abd for possible bleed    Estimated Discharge Date: 2/1/23    Discharge Disposition: Long Term Care

## 2023-02-01 VITALS
DIASTOLIC BLOOD PRESSURE: 67 MMHG | HEIGHT: 68 IN | BODY MASS INDEX: 31.67 KG/M2 | SYSTOLIC BLOOD PRESSURE: 107 MMHG | WEIGHT: 209 LBS | RESPIRATION RATE: 20 BRPM | OXYGEN SATURATION: 99 % | TEMPERATURE: 98 F | HEART RATE: 103 BPM

## 2023-02-01 LAB
ALBUMIN SERPL BCP-MCNC: 1.9 G/DL (ref 3.5–5.2)
ALP SERPL-CCNC: 71 U/L (ref 55–135)
ALT SERPL W/O P-5'-P-CCNC: 23 U/L (ref 10–44)
ANION GAP SERPL CALC-SCNC: 11 MMOL/L (ref 8–16)
AST SERPL-CCNC: 17 U/L (ref 10–40)
BASOPHILS # BLD AUTO: 0.14 K/UL (ref 0–0.2)
BASOPHILS NFR BLD: 0.9 % (ref 0–1.9)
BILIRUB SERPL-MCNC: 0.3 MG/DL (ref 0.1–1)
BUN SERPL-MCNC: 82 MG/DL (ref 8–23)
CALCIUM SERPL-MCNC: 8.7 MG/DL (ref 8.7–10.5)
CHLORIDE SERPL-SCNC: 95 MMOL/L (ref 95–110)
CO2 SERPL-SCNC: 22 MMOL/L (ref 23–29)
CREAT SERPL-MCNC: 6.2 MG/DL (ref 0.5–1.4)
DIFFERENTIAL METHOD: ABNORMAL
EOSINOPHIL # BLD AUTO: 0.1 K/UL (ref 0–0.5)
EOSINOPHIL NFR BLD: 0.8 % (ref 0–8)
ERYTHROCYTE [DISTWIDTH] IN BLOOD BY AUTOMATED COUNT: 19.3 % (ref 11.5–14.5)
EST. GFR  (NO RACE VARIABLE): 7 ML/MIN/1.73 M^2
GLUCOSE SERPL-MCNC: 177 MG/DL (ref 70–110)
HCT VFR BLD AUTO: 21.2 % (ref 37–48.5)
HGB BLD-MCNC: 7.1 G/DL (ref 12–16)
IMM GRANULOCYTES # BLD AUTO: 0.34 K/UL (ref 0–0.04)
IMM GRANULOCYTES NFR BLD AUTO: 2.2 % (ref 0–0.5)
LYMPHOCYTES # BLD AUTO: 1.8 K/UL (ref 1–4.8)
LYMPHOCYTES NFR BLD: 11.5 % (ref 18–48)
MCH RBC QN AUTO: 28.3 PG (ref 27–31)
MCHC RBC AUTO-ENTMCNC: 33.5 G/DL (ref 32–36)
MCV RBC AUTO: 85 FL (ref 82–98)
MONOCYTES # BLD AUTO: 2.1 K/UL (ref 0.3–1)
MONOCYTES NFR BLD: 13.3 % (ref 4–15)
NEUTROPHILS # BLD AUTO: 11.2 K/UL (ref 1.8–7.7)
NEUTROPHILS NFR BLD: 71.3 % (ref 38–73)
NRBC BLD-RTO: 0 /100 WBC
PLATELET # BLD AUTO: 278 K/UL (ref 150–450)
PMV BLD AUTO: 9.8 FL (ref 9.2–12.9)
POCT GLUCOSE: 187 MG/DL (ref 70–110)
POTASSIUM SERPL-SCNC: 4.9 MMOL/L (ref 3.5–5.1)
PROT SERPL-MCNC: 6.7 G/DL (ref 6–8.4)
RBC # BLD AUTO: 2.51 M/UL (ref 4–5.4)
SODIUM SERPL-SCNC: 128 MMOL/L (ref 136–145)
WBC # BLD AUTO: 15.74 K/UL (ref 3.9–12.7)

## 2023-02-01 PROCEDURE — 97116 GAIT TRAINING THERAPY: CPT | Mod: CQ

## 2023-02-01 PROCEDURE — 94761 N-INVAS EAR/PLS OXIMETRY MLT: CPT

## 2023-02-01 PROCEDURE — 97530 THERAPEUTIC ACTIVITIES: CPT | Mod: CQ

## 2023-02-01 PROCEDURE — 80100016 HC MAINTENANCE HEMODIALYSIS

## 2023-02-01 PROCEDURE — 25000003 PHARM REV CODE 250: Performed by: STUDENT IN AN ORGANIZED HEALTH CARE EDUCATION/TRAINING PROGRAM

## 2023-02-01 PROCEDURE — 80053 COMPREHEN METABOLIC PANEL: CPT | Performed by: STUDENT IN AN ORGANIZED HEALTH CARE EDUCATION/TRAINING PROGRAM

## 2023-02-01 PROCEDURE — G0257 UNSCHED DIALYSIS ESRD PT HOS: HCPCS

## 2023-02-01 PROCEDURE — 85025 COMPLETE CBC W/AUTO DIFF WBC: CPT | Performed by: STUDENT IN AN ORGANIZED HEALTH CARE EDUCATION/TRAINING PROGRAM

## 2023-02-01 PROCEDURE — 94799 UNLISTED PULMONARY SVC/PX: CPT

## 2023-02-01 PROCEDURE — 36415 COLL VENOUS BLD VENIPUNCTURE: CPT | Performed by: STUDENT IN AN ORGANIZED HEALTH CARE EDUCATION/TRAINING PROGRAM

## 2023-02-01 PROCEDURE — G0378 HOSPITAL OBSERVATION PER HR: HCPCS

## 2023-02-01 PROCEDURE — 63600175 PHARM REV CODE 636 W HCPCS: Mod: JG | Performed by: FAMILY MEDICINE

## 2023-02-01 RX ORDER — PREGABALIN 25 MG/1
25 CAPSULE ORAL 2 TIMES DAILY
Qty: 60 CAPSULE | Refills: 0 | Status: SHIPPED | OUTPATIENT
Start: 2023-02-01 | End: 2023-03-02 | Stop reason: HOSPADM

## 2023-02-01 RX ORDER — FAMOTIDINE 10 MG/ML
20 INJECTION INTRAVENOUS 2 TIMES DAILY
Status: CANCELLED | OUTPATIENT
Start: 2023-02-01

## 2023-02-01 RX ADMIN — LOPERAMIDE HYDROCHLORIDE 2 MG: 2 CAPSULE ORAL at 09:02

## 2023-02-01 RX ADMIN — PANTOPRAZOLE SODIUM 40 MG: 40 TABLET, DELAYED RELEASE ORAL at 09:02

## 2023-02-01 RX ADMIN — AMLODIPINE BESYLATE 5 MG: 5 TABLET ORAL at 09:02

## 2023-02-01 RX ADMIN — EPOETIN ALFA-EPBX 10000 UNITS: 10000 INJECTION, SOLUTION INTRAVENOUS; SUBCUTANEOUS at 11:02

## 2023-02-01 NOTE — PLAN OF CARE
"RN Case  Order Review      Date:  02/01/2023    Discharging Facility noted:   [facility]    Med List Reconciled?:     Discharge/Readmit orders done    Lines noted:     Peripheral IV - Single Lumen 01/25/23 1444 22 G Left Antecubital  Placement Date/Time: 01/25/23 1444 Present Prior to Hospital Arrival?: No Inserted by: CRNA Size/Length: 22 G Orientation: Left Location: Antecubital Placement directed by: Anatomic [place] Site Prep: Chlorhexidine   6 days    Ileostomy 01/16/23 1230 RLQ  Placement Date/Time: 01/16/23 1230 Present Prior to Hospital Arrival?: No Inserted by: MD Location: RLQ  15 days    Hemodialysis Catheter 01/25/23 1501 right internal jugular  Placement Date/Time: 01/25/23 1501 Present Prior to Hospital Arrival?: No Hand Hygiene: Performed Barrier Precautions: Performed Skin Antisepsis: ChloraPrep Hemodialysis Catheter Type: Tunneled catheter Location: right internal jugular Cathete...      LBM:    01/31/2023    Woundcare/Wound vac:  noted on orders    ABX with end date: Noted in signed/held    Discharging with Line:  HD cath/Ileostomy. Nursing to assess if to DC with PIV or not as pt discharging to LTAC. Assigned SW to F/U.    Ins auth:   Done    Intake forms completed:    NA    Future Appointments   Date Time Provider Department Center   2/13/2023  2:20 PM Ej Mitchell MD [facility] [address]   2/20/2023  2:15 PM Raz Serrano MD [facility] GRP [place] [facility]   4/4/2023  2:20 PM Lucas Bryan DPM [facility] POD [place]   5/4/2023 11:30 AM Pavel Calixto MD [facility] PRICRE [place]     /74   Pulse 98   Temp 97.9 °F (36.6 °C)   Resp 20   Ht 5' 7.5" (1.715 m)   Wt 94.8 kg (208 lb 15.9 oz)   LMP  (LMP Unknown) Comment: BEAU/ NO BSO  1986  SpO2 99%   BMI 32.25 kg/m²        Medication List        CHANGE how you take these medications      metFORMIN 500 MG tablet  Commonly known as: GLUCOPHAGE  Take 1 tablet (500 mg total) by mouth 3 (three) times daily. 2 po qam, 1 po qhs.  What " changed:   when to take this  additional instructions     pregabalin 25 MG capsule  Commonly known as: LYRICA  Take 1 capsule (25 mg total) by mouth 2 (two) times daily.  What changed:   medication strength  how much to take            CONTINUE taking these medications      alpha lipoic acid 600 mg Cap     atorvastatin 40 MG tablet  Commonly known as: LIPITOR  TAKE 1 TABLET BY MOUTH ONCE DAILY     BETAHISTINE (BULK) MISC     blood-glucose meter kit  Use to test twice a day     CALTRATE WITH VITAMIN D3 ORAL     CINNAMON ORAL     coenzyme Q10 100 mg capsule     cyanocobalamin 1,000 mcg/mL injection  Inject 1 mL (1,000 mcg total) into the muscle every 30 days.     dicyclomine 10 MG capsule  Commonly known as: BENTYL     DULoxetine 30 MG capsule  Commonly known as: CYMBALTA  Take 1 capsule (30 mg total) by mouth 2 (two) times daily.     econazole nitrate 1 % cream  Apply topically 2 (two) times daily.     finasteride 5 mg tablet  Commonly known as: PROSCAR     glucosamine HCl 1,500 mg Tab     KRILL OIL ORAL     lancets Misc  1 lancet by Misc.(Non-Drug; Combo Route) route once daily.     meclizine 25 mg tablet  Commonly known as: ANTIVERT  Take 1 tablet (25 mg total) by mouth 3 (three) times daily as needed for Dizziness.     MILK THISTLE ORAL     pantoprazole 40 MG tablet  Commonly known as: PROTONIX  TAKE 1 TABLET BY MOUTH ONCE DAILY     temazepam 30 mg capsule  Commonly known as: RESTORIL  TAKE ONE CAPSULE BY MOUTH NIGHTLY AS NEEDED FOR INSOMNIA     triamcinolone acetonide 0.1% 0.1 % ointment  Commonly known as: KENALOG  Apply topically 2 (two) times daily as needed (rash).     triamterene-hydrochlorothiazide 37.5-25 mg 37.5-25 mg per tablet  Commonly known as: MAXZIDE-25     TRUE METRIX GLUCOSE TEST STRIP Strp  Generic drug: blood sugar diagnostic  USE ONE STRIP FOR TESTING 2 TIMES A DAY     vit C,X-Cy-hxjsk-lutein-zeaxan 250-90-40-1 mg Cap  Commonly known as: PRESERVISION AREDS 2     vitamins A,C,E-zinc-copper  14,320-226-200 unit-mg-unit Cap     ZYRTEC ORAL            STOP taking these medications      flaxseed oiL 1,000 mg Cap     gluc-condr-om3-dha-epa-fish-st 473-928-60-54 mg Cap               Where to Get Your Medications        You can get these medications from any pharmacy    Bring a paper prescription for each of these medications  pregabalin 25 MG capsule

## 2023-02-01 NOTE — PLAN OF CARE
GUEVARA received clearance from Mylene with [facility]. Mylene expressed that they have received auth for pt to dc today and return to LTAC.       Report:[phone] Sari  Room:228  Transportation: SW setup ambulance transportation for 2:30 pm. ETA pending     Transport packet placed at nursing station.     SW will continue to follow pt throughout her transitions of care and assist with any dc needs.     Future Appointments   Date Time Provider Department Center   2/13/2023  2:20 PM Ej Mitchell MD [facility] [address]   2/20/2023  2:15 PM Raz Serrano MD [facility] GRP [place] [facility]   4/4/2023  2:20 PM Lucas Bryan DPM [facility] POD [place]   5/4/2023 11:30 AM [facility]        02/01/23 0928   Final Note   Assessment Type Final Discharge Note   Anticipated Discharge Disposition LTAC   Hospital Resources/Appts/Education Provided Appointments scheduled by Navigator/Coordinator   Post-Acute Status   Discharge Delays None known at this time

## 2023-02-01 NOTE — CARE UPDATE
[facility]    FACILITY TRANSFER ORDERS      Patient Name: Lily Green  YOB: 1950    PCP: Pavel Calixto MD   PCP Address: [address] [place] [address] [address]  PCP Phone Number: [phone]  PCP Fax: [phone]    Encounter Date: 02/01/2023    Admit to: [facility]    Vital Signs:  Routine    Diagnoses:   Active Hospital Problems    Diagnosis  POA    *Problem with vascular access [Z78.9]  Yes    Anemia [D64.9]  Yes    Hypophosphatemia [E83.39]  Yes    Primary hypertension [I10]  Yes    Hyponatremia [E87.1]  Yes    ATN (acute tubular necrosis) [N17.0]  Yes    Large bowel ischemia [K55.9]  Yes    Idiopathic neuropathy [G60.9]  Yes    Type 2 diabetes mellitus with diabetic polyneuropathy, without long-term current use of insulin [E11.42]  Yes      Resolved Hospital Problems   No resolved problems to display.       Allergies:  Review of patient's allergies indicates:   Allergen Reactions    Crestor [rosuvastatin] Swelling    Gluten protein        Diet: renal diet    Activities: Activity as tolerated    Goals of Care Treatment Preferences:  Code Status: Full Code      Nursing: Routine nursing care     Labs: CBC and CMP  [place]      CONSULTS:    Physical Therapy to evaluate and treat.  and Occupational Therapy to evaluate and treat.    MISCELLANEOUS CARE:  Colostomy Care: Empty bag every shift and PRN. Change and clean site every 48 hours.     WOUND CARE ORDERS  Yes: None    Medications: Review discharge medications with patient and family and provide education.      Current Discharge Medication List        START taking these medications    Details   pregabalin (LYRICA) 25 MG capsule Take 1 capsule (25 mg total) by mouth 2 (two) times daily.  Qty: 60 capsule, Refills: 0           CONTINUE these medications which have NOT CHANGED    Details   alpha lipoic acid 600 mg Cap       atorvastatin (LIPITOR) 40 MG tablet TAKE 1 TABLET BY MOUTH ONCE DAILY  Qty: 90 tablet, Refills: 3     Associated Diagnoses: Hyperlipidemia, mixed      betahistine HCl (BETAHISTINE, BULK, MISC)       blood sugar diagnostic (TRUE METRIX GLUCOSE TEST STRIP) Strp USE ONE STRIP FOR TESTING 2 TIMES A DAY  Qty: 100 each, Refills: 11      calcium carbonate/vitamin D3 (CALTRATE WITH VITAMIN D3 ORAL) Take by mouth.      cetirizine HCl (ZYRTEC ORAL)       cinnamon bark (CINNAMON ORAL) Take by mouth.      coenzyme Q10 100 mg capsule       cyanocobalamin 1,000 mcg/mL injection Inject 1 mL (1,000 mcg total) into the muscle every 30 days.  Qty: 10 mL, Refills: 3    Associated Diagnoses: B12 deficiency      dicyclomine (BENTYL) 10 MG capsule       DULoxetine (CYMBALTA) 30 MG capsule Take 1 capsule (30 mg total) by mouth 2 (two) times daily.  Qty: 60 capsule, Refills: 11    Associated Diagnoses: Mild recurrent major depression; Idiopathic neuropathy; Bilateral foot pain      econazole nitrate 1 % cream Apply topically 2 (two) times daily.  Qty: 30 g, Refills: 2    Associated Diagnoses: Tinea pedis of both feet      finasteride (PROSCAR) 5 mg tablet       glucosamine HCl 1,500 mg Tab Take 1,500 mg by mouth.      KRILL OIL ORAL Take by mouth.      lancets Misc 1 lancet by Misc.(Non-Drug; Combo Route) route once daily.  Qty: 200 each, Refills: 3    Comments: TO USE WITH TRUE METRIX MACHINE  Associated Diagnoses: Type 2 diabetes mellitus with diabetic polyneuropathy, without long-term current use of insulin      meclizine (ANTIVERT) 25 mg tablet Take 1 tablet (25 mg total) by mouth 3 (three) times daily as needed for Dizziness.  Qty: 20 tablet, Refills: 0      metFORMIN (GLUCOPHAGE) 500 MG tablet Take 1 tablet (500 mg total) by mouth 3 (three) times daily. 2 po qam, 1 po qhs.  Qty: 270 tablet, Refills: 3    Associated Diagnoses: Type 2 diabetes mellitus with diabetic polyneuropathy, without long-term current use of insulin      MILK THISTLE ORAL Take by mouth.      pantoprazole (PROTONIX) 40 MG tablet TAKE 1 TABLET BY MOUTH ONCE DAILY    Qty: 90 tablet, Refills: 3      temazepam (RESTORIL) 30 mg capsule TAKE ONE CAPSULE BY MOUTH NIGHTLY AS NEEDED FOR INSOMNIA  Qty: 30 capsule, Refills: 2    Associated Diagnoses: Primary insomnia      triamcinolone acetonide 0.1% (KENALOG) 0.1 % ointment Apply topically 2 (two) times daily as needed (rash).  Qty: 80 g, Refills: 11    Associated Diagnoses: Eczema, unspecified type      triamterene-hydrochlorothiazide 37.5-25 mg (MAXZIDE-25) 37.5-25 mg per tablet       vit C,M-Oo-hvkfp-lutein-zeaxan (PRESERVISION AREDS 2) 489-501-48-1 mg-unit-mg-mg Cap       vitamins  A,C,E-zinc-copper 14,320-226-200 unit-mg-unit Cap Take 1 tablet by mouth once daily.      blood-glucose meter kit Use to test twice a day  Qty: 1 each, Refills: 0    Comments: True Metrix Meter           STOP taking these medications       flaxseed oil 1,000 mg Cap Comments:   Reason for Stopping:         gluc-condr-om3-dha-epa-fish-st 995-160-31-54 mg Cap Comments:   Reason for Stopping:                  Immunizations Administered as of 2/1/2023       Name Date Dose VIS Date Route Exp Date    COVID-19, MRNA, LN-S, PF (Pfizer) (Purple Cap) 3/6/2021 10:35 AM 0.3 mL 12/12/2020 Intramuscular 6/30/2021    Site: Left deltoid     Given By: Marcia Saenz LPN     : [employer]     Lot: [id number]     COVID-19, MRNA, LN-S, PF (Pfizer) (Purple Cap) 2/13/2021 11:01 AM 0.3 mL 12/12/2020 Intramuscular 6/30/2021    Site: Left deltoid     Given By: Gerri Choi CRNA     : [employer]     Lot: [id number]             This patient has had both covid vaccinations    Some patients may experience side effects after vaccination.  These may include fever, headache, muscle or joint aches.  Most symptoms resolve with 24-48 hours and do not require urgent medical evaluation unless they persist for more than 72 hours or symptoms are concerning for an unrelated medical condition.        _________________________________  Deng Hollingsworth MD  02/01/2023

## 2023-02-01 NOTE — SUBJECTIVE & OBJECTIVE
Interval History: NAEON. Pt to have HD this AM. Will continue to work on placement.     Review of Systems   Reason unable to perform ROS: Limited responses to questions.   Constitutional:  Positive for fatigue. Negative for fever.   HENT:  Negative for congestion and sore throat.    Eyes:  Negative for visual disturbance.   Respiratory:  Negative for apnea, shortness of breath and wheezing.    Cardiovascular:  Negative for chest pain.   Gastrointestinal:  Negative for nausea and vomiting.   Genitourinary:  Negative for dysuria.   Musculoskeletal:  Negative for arthralgias and back pain.   Neurological:  Negative for dizziness and headaches.        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    Objective:     Vital Signs (Most Recent):  Temp: 98 °F (36.7 °C) (02/01/23 0730)  Pulse: 91 (02/01/23 0730)  Resp: 19 (02/01/23 0730)  BP: 132/64 (02/01/23 0730)  SpO2: 99 % (02/01/23 0730) Vital Signs (24h Range):  Temp:  [96.6 °F (35.9 °C)-98 °F (36.7 °C)] 98 °F (36.7 °C)  Pulse:  [86-98] 91  Resp:  [18-19] 19  SpO2:  [97 %-99 %] 99 %  BP: (128-137)/(64-72) 132/64     Weight: 94.8 kg (208 lb 15.9 oz)  Body mass index is 32.25 kg/m².    Intake/Output Summary (Last 24 hours) at 2/1/2023 0819  Last data filed at 2/1/2023 0030  Gross per 24 hour   Intake --   Output 150 ml   Net -150 ml      Physical Exam  HENT:      Head: Normocephalic.      Right Ear: External ear normal.      Left Ear: External ear normal.      Nose: Nose normal.      Mouth/Throat:      Mouth: Mucous membranes are moist.   Eyes:      Pupils: Pupils are equal, round, and reactive to light.   Cardiovascular:      Rate and Rhythm: Normal rate.      Pulses: Normal pulses.   Pulmonary:      Effort: Pulmonary effort is normal.   Abdominal:      General: Abdomen is flat.      Tenderness: There is abdominal tenderness.      Comments: Laparotomy and Ostomy sites clear clean and intact  solid output per ileostomy    Skin:      General: Skin is warm.   Neurological:      Mental Status: She is alert.      Comments: Follows all commands  Minimal responses to questions but appropriate   Psychiatric:      Comments: Some confusion        Significant Labs:   Recent Labs   Lab 01/30/23  0330 01/31/23  0630 02/01/23  0302   WBC 14.43* 14.67* 15.74*   HGB 6.5* 8.1* 7.1*   HCT 19.3* 25.0* 21.2*   MCV 84 89 85   RBC 2.31* 2.82* 2.51*   MCH 28.1 28.7 28.3   MCHC 33.7 32.4 33.5   RDW 19.6* 19.5* 19.3*    298 278   MPV 9.9 10.3 9.8   GRAN 69.3  10.0* 67.0  9.9* 71.3  11.2*   LYMPH 12.0*  1.7 13.3*  2.0 11.5*  1.8   MONO 14.3  2.1* 14.9  2.2* 13.3  2.1*   EOSINOPHIL 0.8 0.7 0.8   BASOPHIL 0.6 1.2 0.9     Recent Labs   Lab 01/26/23  0636 01/27/23 2213 01/28/23  0425 01/30/23  0330 01/31/23  0630 02/01/23  0302   * 128*   < > 129* 132* 128*   K 4.8 4.8   < > 4.7 4.8 4.9    99   < > 97 97 95   CO2 16* 18*   < > 22* 24 22*   ANIONGAP 15 11   < > 10 11 11   BUN 67* 81*   < > 65* 50* 82*   CREATININE 5.6* 5.8*   < > 5.3* 4.6* 6.2*   GLU 97 218*   < > 199* 150* 177*   CALCIUM 8.4* 8.4*   < > 8.2* 8.9 8.7   PROT 6.4 6.5   < > 6.4 7.3 6.7   ALBUMIN 1.8* 1.8*   < > 1.7* 2.0* 1.9*   ALKPHOS 90 83   < > 72 70 71   BILITOT 0.4 0.3   < > 0.2 0.4 0.3   * 100*   < > 43 30 23   AST 34 24   < > 19 20 17   MG 2.2 2.0  --   --   --   --    PHOS 5.3* 4.0  --   --   --   --     < > = values in this interval not displayed.     No results for input(s): COLORU, APPEARANCEUA, PHUR, SPECGRAV, PROTEINUA, GLUCUA, KETONESU, BILIRUBINUA, OCCULTUA, UROBILINOGEN, NITRITE, LEUKOCYTESUR, RBCUA, WBCUA, BACTERIA, SQUAMEPITHEL, HYALINECASTS, GRANULARCAST, MICROCMT in the last 168 hours.    Invalid input(s): SPECIMENU, AMORPHOUSU  No results for input(s): TROPONINI, CPK, CPKMB in the last 168 hours.  No results for input(s): PT, INR, APTT in the last 168 hours.  No results for input(s): TSH, D6OEMWT, G3ZDADN, THYROIDAB, FREET4 in the last 168 hours.  X-Ray  Chest 1 View    Result Date: 1/27/2023  EXAMINATION: XR CHEST 1 VIEW CLINICAL HISTORY: Unspecified complication of cardiac and vascular prosthetic device, implant and graft, initial encounter TECHNIQUE: Single frontal view of the chest was performed. COMPARISON: 01/14/2023. FINDINGS: There is a dual lumen right internal jugular central venous catheter terminating in the brachiocephalic vein or high SVC.  There is a suspected slight kink in the distal aspect of the catheter.  The lungs are well expanded and clear.  The pleural spaces are clear.  The cardiac silhouette is unremarkable.  The visualized osseous structures demonstrate degenerative changes.     Slight kink within the distal aspect of the central venous catheter. Electronically signed by: Wyatt Martinez Date:    01/27/2023 Time:    23:32    CT Head Without Contrast    Result Date: 1/18/2023  EXAMINATION: CT HEAD WITHOUT CONTRAST CLINICAL HISTORY: Neuro deficit, acute, stroke suspected; TECHNIQUE: Low dose axial images were obtained through the head.  Coronal and sagittal reformations were also performed. Contrast was not administered. COMPARISON: 01/13/2023 FINDINGS: There is motion artifact. There is generalized cerebral volume loss.  There is hypoattenuation in a periventricular fashion, likely sequela of chronic microvascular ischemic change.  There is no evidence of acute major vascular territory infarct, hemorrhage, or mass.  There is no hydrocephalus.  There are no abnormal extra-axial fluid collections.  The paranasal sinuses and mastoid air cells are clear, and there is no evidence of calvarial fracture.  The visualized soft tissues are unremarkable.     1. Allowing for motion artifact, no convincing acute intracranial abnormalities.  There is sequela of chronic microvascular ischemic change and senescent change. Electronically signed by: Neto Galvez MD Date:    01/18/2023 Time:    13:55    CT Head Without Contrast    Result Date:  1/13/2023  EXAMINATION: CT HEAD WITHOUT CONTRAST CLINICAL HISTORY: Subarachnoid hemorrhage (SAH) suspected;recent Fall at home, unwitnessed; TECHNIQUE: Low dose axial CT images obtained throughout the head without intravenous contrast. Sagittal and coronal reconstructions were performed. COMPARISON: None available. FINDINGS: There is age-related brain parenchymal volume loss.  There is no hydrocephalus.  There is hypoattenuation the supratentorial white matter which can be seen with mild microvascular ischemic changes.  No extra-axial blood or fluid collections.  No parenchymal mass, hemorrhage, edema or major vascular distribution infarct. No calvarial fracture.  The scalp is unremarkable.  Bilateral paranasal sinuses and mastoid air cells are clear.     No acute intracranial abnormality. Electronically signed by: Wyatt Martinez Date:    01/13/2023 Time:    23:30    CT Abdomen Pelvis With Contrast    Result Date: 1/21/2023  EXAMINATION: CT ABDOMEN PELVIS WITH CONTRAST CLINICAL HISTORY: Abdominal abscess/infection suspected; TECHNIQUE: Low dose axial images, sagittal and coronal reformations were obtained from the lung bases to the pubic symphysis following the IV administration of 75 mL of Omnipaque 350 without enteric contrast COMPARISON: Prior dated 01/16/2023 FINDINGS: Exam is limited by motion artifact. There is left pleural fluid.  There is consolidation it the bilateral lung bases. There is a small volume of intraperitoneal free air consistent with recent exploratory laparotomy.  The liver is normal in size and contour.  The gallbladder is nondistended.  The portal vein is patent. The pancreas, spleen, and adrenal glands have a normal appearance. The left kidney upper pole demonstrates areas of cortical thinning with dystrophic calcification.  Right renal cortex is of normal thickness.  No definite solid renal mass. There is postsurgical change of subtotal colectomy with rectal stump.  There is a right lower  quadrant enterostomy.  There is ill-defined soft tissue density about the antrostomy site within the subcutaneous fat which could reflect hematoma.  There is body wall edema.  There is high attenuation fluid and air which appears to be within the vagina which could be entering from the antritis or could reflect a rectovaginal fistula. The bladder is nondistended and not well evaluated.  It appears to demonstrate some high attenuation material and this could reflect hematuria.  Correlate with urinalysis. There is scattered complex fluid in the abdomen and pelvis, improved from prior exam consistent with evolving hemoperitoneum.  No adenopathy is seen. No osseous abnormalities identified.     1.  No definite intra-abdominal abscess identified.  Scattered non loculated peritoneal fluid is present of slightly high attenuation consistent with of all vein hemoperitoneum, decreased in volume compared to prior CT. 2.  Patchy consolidation at the lung bases bilaterally suspicious for pneumonia or aspiration pneumonitis. 3.  Left pleural fluid 4.  Postsurgical change of colectomy and right lower quadrant enterostomy.  No evidence of obstruction.  There is high attenuation within the subcutaneous fat about the enterostomy site most likely related to hematoma. 5.  High density material and air is seen within the vagina which could be related to entry via the antritis or alternatively could reflect an enterovaginal fistula.  Correlate clinically. 6.  High attenuation fluid in the bladder which could be due to hematuria or debris.  Correlate with urinalysis. 7.  Small volume of intraperitoneal free air consistent with recent exploratory laparoscopy. This report was flagged in Epic as abnormal. Electronically signed by: Cristina Pepper MD Date:    01/21/2023 Time:    16:40    CT Abdomen Pelvis With Contrast    Result Date: 1/13/2023  EXAMINATION: CT ABDOMEN PELVIS WITH CONTRAST CLINICAL HISTORY: Abdominal abscess/infection  suspected; TECHNIQUE: Low dose axial images, sagittal and coronal reformations were obtained from the lung bases to the pubic symphysis following the IV administration of 75 mL of Omnipaque 350 .  Oral contrast was not given. COMPARISON: None. FINDINGS: The lung bases are free of focal consolidations.  No pleural effusions are identified.  Bony structures appear intact.  There is no evidence for acute fracture or bone destruction.  There are degenerative changes within the lumbar spine.  There is grade 1 anterolisthesis of L4 on L5. The liver is normal in size and is homogeneous in density with no focal liver lesions identified.  The gallbladder is distended but otherwise appears unremarkable.  There is no evidence for intrahepatic or extrahepatic biliary dilatation.  The portal venous system is patent.  The spleen and pancreas both appear grossly unremarkable.  There is a small hiatal hernia.  The stomach otherwise appears grossly unremarkable.  The adrenal glands are not enlarged.  There is a subcentimeter hypodensity within the upper pole of the left kidney likely representing a small cyst but too small to adequately characterize.  There is a 9 mm nonobstructing calculus within the upper pole of the left kidney.  There is no evidence for hydronephrosis.  There is atherosclerotic calcification present within the abdominal aorta which tapers normally without aneurysmal dilatation.  No para-aortic lymphadenopathy is identified.  The appendix is not confidently identified, however there are no CT findings to suggest appendicitis.  There is distension of the colon to the rectum with a large amount of stool and fluid throughout the colon and rectum.  There is a segment of colon at the junction of the descending colon and sigmoid colon that appears relatively narrow with probable circumferential wall thickening (image 136, series 2, image 84, series 601, image 99, series 602).  A colonic mass at this location should be  strongly considered.  GI consultation is recommended.  The uterus is not visualized and is likely absent.  No abnormal adnexal masses are identified.  The urinary bladder is unremarkable.  There is no evidence for pelvic or inguinal lymphadenopathy.  There is a tiny umbilical hernia present.  No ascites is identified.     Prominent amount of stool throughout the rectum and sigmoid colon.  Fluid and stool distending the majority of the remainder of the colon with questionable segment of more normal caliber colon at the junction of the descending colon and sigmoid colon.  GI consultation is recommended. 9 mm nonobstructing calculus within the upper pole of the left kidney. Small hiatal hernia. Subcentimeter left upper pole renal cyst. Distended gallbladder which otherwise appears unremarkable. Electronically signed by: Derick Stahl MD Date:    01/13/2023 Time:    15:44    X-Ray Chest AP Portable    Result Date: 1/14/2023  EXAMINATION: XR CHEST AP PORTABLE CLINICAL HISTORY: Trialysis insertion;. TECHNIQUE: Single frontal portable view of the chest was performed. COMPARISON: January 14, 2023 at 09:00 FINDINGS: Support devices: NG tube and left internal jugular central venous catheter remain.  Right internal jugular central venous catheter has its tip overlying the expected location of the superior vena cava.  Surgical changes in the right humerus remain. There has not been any detrimental change since January 14, 2023 at 09:00.     No detrimental change. Electronically signed by: Jackie Tavera MD Date:    01/14/2023 Time:    14:46    X-Ray Chest AP Portable    Result Date: 1/14/2023  EXAMINATION: XR CHEST AP PORTABLE CLINICAL HISTORY: ET tube placement; TECHNIQUE: Single frontal view of the chest was performed. COMPARISON: Chest radiograph performed 01/13/2023, 20:45 hours. FINDINGS: Tip of endotracheal tube projects approximately 41 mm above level yo.  Tip and side port of enteric tube projects subdiaphragmatic  in location.  Grossly unchanged position left internal jugular approach central venous catheter.  Monitoring leads are noted. Grossly unchanged cardiomediastinal contours.  Lungs appear grossly clear.  No definite pneumothorax or large volume pleural effusion. Anticipated postoperative pneumoperitoneum noted. Remainder examination not substantially changed relative to 01/13/2023, 20:45 hours examination.     As above. Electronically signed by: Silver Funes Date:    01/14/2023 Time:    09:19    X-Ray Chest AP Portable    Result Date: 1/13/2023  EXAMINATION: XR CHEST AP PORTABLE CLINICAL HISTORY: Encounter for adjustment and management of vascular access device TECHNIQUE: Single frontal view of the chest was performed. COMPARISON: 01/13/2023. FINDINGS: There is a left internal jugular central venous catheter terminating high SVC.  The lungs are well expanded and clear.  The pleural spaces are clear.  No focal opacities are seen.  The cardiac silhouette is unremarkable.  There are calcifications of the aortic arch.  The visualized osseous structures demonstrate degenerative changes.  Right humeral head anchor screw.     Central venous catheter as above. Electronically signed by: Wyatt Martinez Date:    01/13/2023 Time:    21:11    X-Ray Chest AP Portable    Result Date: 1/13/2023  EXAMINATION: XR CHEST AP PORTABLE CLINICAL HISTORY: Sepsis; TECHNIQUE: Single frontal view of the chest was performed. COMPARISON: None FINDINGS: The heart is not enlarged.  Atherosclerotic calcification is present within the aortic arch.  Pulmonary vasculature is within normal limits.  The lungs are well aerated and free of focal consolidations.  There is no evidence for pneumothorax or large pleural effusions.  There are prominent degenerative changes of the right shoulder.  A bone anchor is identified within the proximal right humerus.     No acute chest disease identified. Electronically signed by: Derick Stahl MD Date:    01/13/2023  Time:    14:23    DXA Bone Density Spine And Hip    Result Date: 1/3/2023  EXAMINATION: DEXA BONE DENSITY SPINE HIP CLINICAL HISTORY: Asymptomatic menopausal state TECHNIQUE: DXA scanning was performed over the left hip and lumbar spine.  Review of the images confirms satisfactory positioning and technique. FINDINGS: The L1 to L4 vertebral bone mineral density is equal to 1.15 g/cm squared with a T score of -0.3. The left femoral neck bone mineral density is equal to 1.19 g/cm squared with a T score of 1.1. There is a 7.2% risk of a major osteoporotic fracture and a 0.6% risk of hip fracture in the next 10 years (FRAX).     Lumbar spine normal bone density. Left femoral neck normal bone density. Electronically signed by: Derick Engle MD Date:    01/03/2023 Time:    13:11    XR NG/OG tube placement check, non-radiologist performed    Result Date: 1/14/2023  EXAMINATION: XR NG/OG TUBE PLACEMENT CHECK, NON-RADIOLOGIST PERFORMED CLINICAL HISTORY: ngt placement; TECHNIQUE: AP radiograph the chest. COMPARISON: None FINDINGS: There is nasogastric tube with the tip and side-port in the stomach.  There is a left internal jugular central venous catheter terminating in the high SVC.  There are overlying leads.  The lungs are well expanded.  There are chronic appearing interstitial opacities.  No large focal consolidation.  The cardiac silhouette is unremarkable.  The pleural spaces are clear.  There are calcifications of the aortic arch.  Visualized osseous structures demonstrate degenerative changes.  There is an anchor screw overlying the right proximal humerus.     Nasogastric tube with the tip and side-port in the stomach as above. Electronically signed by: Wyatt Martinez Date:    01/14/2023 Time:    01:28    SURG FL Surgery Fluoro Usage    Result Date: 1/28/2023  See OP Notes for results. IMPRESSION: See OP Notes for results. This procedure was auto-finalized by: Virtual Radiologist    SURG FL Surgery Fluoro Usage    Result  Date: 1/25/2023  See OP Notes for results. IMPRESSION: See OP Notes for results. This procedure was auto-finalized by: Virtual Radiologist    Echo    Result Date: 1/17/2023  · Concentric hypertrophy and · Normal right ventricular size with normal right ventricular systolic function. · The estimated ejection fraction is 35%. · Grade I left ventricular diastolic dysfunction. · Mild aortic regurgitation. · Mild mitral regurgitation. · Mild tricuspid regurgitation. · There is no pulmonary hypertension.      CTA Abdomen and Pelvis    Result Date: 1/16/2023  EXAMINATION: CTA ABDOMEN AND PELVIS CLINICAL HISTORY: GI bleed;Post colectomy; TECHNIQUE: CTA abdomen and pelvis performed per GI bleed protocol before and after administration of 130 mL Omnipaque 350 intravenous contrast. COMPARISON: 01/13/2023. FINDINGS: There are small bilateral pleural effusions. Liver and spleen are unremarkable.  Vicariously excreted contrast seen within the gallbladder.  No biliary dilatation.  Pancreas is unremarkable. There is hyperenhancement of the adrenal glands, which may be seen with shock.  Note made of nonobstructing 0.6 cm calculus in the left upper pole collecting system.  No hydronephrosis.  Retained contrast within the renal parenchyma in keeping with renal failure. Status post colectomy with left-sided ileostomy.  There is radiopaque material within the small bowel lumen, likely vicariously excreted from prior study.  There is marked bowel wall hyperenhancement, consistent with shock.  There is no evidence for intraluminal active contrast extravasation or pooling.  Stomach is distended with air and fluid, noting fluid in the lower esophagus. There is a large volume of hemoperitoneum throughout the abdomen and pelvis.  There is active extravasation of arterial contrast in the left upper quadrant in keeping with rapid active hemorrhage.  Postoperative pneumoperitoneum noted. Abdominal aorta is normal caliber.  IVC is collapsed, in  keeping with hypovolemic shock. Urinary bladder is decompressed. Regional osseous structures demonstrate no aggressive appearing lytic or blastic lesions.  Degenerative changes of the lumbar spine noted.     1. Active extravasation of arterial contrast in the left upper quadrant in keeping with rapid active hemorrhage.  Large volume of hemoperitoneum throughout the abdomen and pelvis. 2. Imaging findings of hypovolemic shock including bowel wall and adrenal gland hyperenhancement, and IVC collapse. 3. Stomach is distended with air and fluid, noting fluid in the lower esophagus. 4. Additional findings above. Findings observed at 10:45 and discussed with Dr. Javier at 11:02. Electronically signed by: Eduar Solitario MD Date:    01/16/2023 Time:    11:15    X-Ray KUB    Result Date: 1/16/2023  EXAMINATION: XR KUB CLINICAL HISTORY: distension, worsening pain, recent emergent total colectomy; TECHNIQUE: Single AP supine view of the abdomen (KUB) was performed COMPARISON: Abdominal radiograph 01/14/2023 FINDINGS: Seen closure staples in keeping reported recent surgery.  Suspect anticipated postoperative pneumoperitoneum. Gaseous distention of stomach.  Dilated gas-filled loop of small bowel measuring up to 5 cm left lower quadrant.  Findings could relate to ileus versus developing small bowel obstruction.  Attention on follow-up.  Paucity of gas-filled bowel loops in the right hemiabdomen and pelvic region. No acute change in the visualized lung bases.     As above. Electronically signed by: Silver Funes Date:    01/16/2023 Time:    09:27    Microbiology Results (last 7 days)       ** No results found for the last 168 hours. **

## 2023-02-01 NOTE — PROGRESS NOTES
[facility] Progress Note    Subjective:      Ms. Green was seen on dialysis this morning. Tolerating well without any issues. Catheter functioning well. She does not participate much in conversation today.     Objective:   Last 24 Hour Vital Signs:  BP  Min: 128/68  Max: 137/72  Temp  Av.3 °F (36.3 °C)  Min: 96.6 °F (35.9 °C)  Max: 98 °F (36.7 °C)  Pulse  Av.9  Min: 86  Max: 98  Resp  Av.1  Min: 18  Max: 19  SpO2  Av.3 %  Min: 97 %  Max: 99 %  I/O last 3 completed shifts:  In: -   Out: 150 [Stool:150]    Physical Examination:  Gen: awake, alert, flat affect   HEENT: normocephalic, EOMI, MMM  CV: RRR, no murmurs   Lungs: CTAB, symmetric chest rise  GI: Soft, non tender, non distended  Extrem: no edema, clubbing, or cyanosis  Skin: dry, warm, intact.   Neuro: AAOx3, no focal deficits         Laboratory:  Recent Labs   Lab 23  0330 23  0630 23  0302   WBC 14.43* 14.67* 15.74*   HGB 6.5* 8.1* 7.1*   HCT 19.3* 25.0* 21.2*    298 278   * 132* 128*   K 4.7 4.8 4.9   CL 97 97 95   CREATININE 5.3* 4.6* 6.2*   BUN 65* 50* 82*   CO2 22* 24 22*   ALT 43 30 23   AST 19 20 17         Radiology:  Imaging has been reviewed personally.       Assessment and Plan:     Lily Green is a 72 y.o. woman with a history of celiac disease, DM2 well controlled, HTN, anxiety, and recent incident of necrotizing ischemic colitis s/p colectomy on . Developed ATN with refractory acidosis and started on HD  with second ischemic insult after hemorrhagic shock on . Was discharged  but came back in after malfunction of tunneled line.      Stage III Anuric HINA  - baseline creatinine 0.8  - Pt with ischemic colitis s/p colectomy  and another ischemic event  with hemorrhagic shock.  - ATN with 2 separate insults will likely take several weeks to months to recover  - HD initiated   - tunneled line replaced  and functioning well  - continue MWF schedule  -  renally dose all medications      Hyponatremia  - Most consistent with hypervolemic hyponatremia given anuria. Na 128 today, expect to improve after dialysis      Hypocalcemia  - Calcium corrects for low albumin      Acute on chronic Anemia  - Hb 7.1 today. Has received blood this admission.   - EPO 10,000 units with dialysis   - IV iron with HD today         Thank you for allowing us to participate in the care of this patient. Please call with any questions.       Elizabeth Mobley, DO  [facility]

## 2023-02-01 NOTE — PROGRESS NOTES
[facility] Medicine  Progress Note    Patient Name: Lily Green  MRN: [id number]  Patient Class: OP- Observation   Admission Date: 1/27/2023  Length of Stay: 0 days  Attending Physician: Eduar Albarran III, MD  Primary Care Provider: Pavel Calixto MD        Subjective:     Principal Problem:Problem with vascular access        HPI:  Patient is a 73 y/o female with PMHx of diabetes type 2 (treated with metformin), HTN, and celiac disease (following a strict diet), Acute kidney failure on HD, ischemic colitis s/p total cholectomy who represented to [facility] one day after discharge due to issues with her tunneeled HD catherter while in LTAC.  On previous admission patient was noted to have severe peritonitis secondary to ischemic bowel necrosis.  Underwent total colectomy.  Subsequently developed brisk postop bleed with severe blood loss.  Bleeding was stopped with subsequent ex lap however patient developed worsening ATN and was initiated on CRRT at the time.  When patient was hemodynamically stable, received tunnel cath placement on 01/25/2023.  Initial HD run using tunneled catheter was stopped early due to blockage.  It was suspected that time to be from vasospasm.  Subsequent HD was performed successfully on 01/26/2023 and patient was discharged to LTAC to continue Monday Wednesday Friday dialysis, PTOT and to finish 14 day course of antibiotics.  While at [facility], patient was clinically stable however when dialysis was attempted on Friday 01/27/2023, line was noted to be blocked and clotted.  Nephrology at [facility] recommended patient to return to [facility] for line revision.    Upon arrival to the ED, the patient appears clinically stable but chronically ill.  Mentation and clinical status at baseline per family in the room consistent with patient's status at discharge from hospital 1 day prior.  Labs drawn in ED did not reveal emergent need for dialysis.  Electrolytes and kidney function appears  stable from previous labs.  However, patient's hemoglobin noted to be 6.8 down from 8.5 yesterday.  Patient has no obvious sources of bleeding at this time.  Is hemodynamically stable.  Patient was typed and screened and consented however no blood products were given until dialysis access could be established.  Nephrology was consulted and recommended to fix access in the morning as patient does not have emergent need for dialysis at this time.  Patient was admitted to [facility] Medicine for replacement of tunneled dialysis line and asymptomatic anemia.      Overview/Hospital Course:  No notes on file    Interval History: NAEON. Pt to have HD this AM. Will continue to work on placement.     Review of Systems   Reason unable to perform ROS: Limited responses to questions.   Constitutional:  Positive for fatigue. Negative for fever.   HENT:  Negative for congestion and sore throat.    Eyes:  Negative for visual disturbance.   Respiratory:  Negative for apnea, shortness of breath and wheezing.    Cardiovascular:  Negative for chest pain.   Gastrointestinal:  Negative for nausea and vomiting.   Genitourinary:  Negative for dysuria.   Musculoskeletal:  Negative for arthralgias and back pain.   Neurological:  Negative for dizziness and headaches.        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    Objective:     Vital Signs (Most Recent):  Temp: 98 °F (36.7 °C) (02/01/23 0730)  Pulse: 91 (02/01/23 0730)  Resp: 19 (02/01/23 0730)  BP: 132/64 (02/01/23 0730)  SpO2: 99 % (02/01/23 0730) Vital Signs (24h Range):  Temp:  [96.6 °F (35.9 °C)-98 °F (36.7 °C)] 98 °F (36.7 °C)  Pulse:  [86-98] 91  Resp:  [18-19] 19  SpO2:  [97 %-99 %] 99 %  BP: (128-137)/(64-72) 132/64     Weight: 94.8 kg (208 lb 15.9 oz)  Body mass index is 32.25 kg/m².    Intake/Output Summary (Last 24 hours) at 2/1/2023 0819  Last data filed at 2/1/2023 0030  Gross per 24 hour   Intake --   Output 150 ml   Net -150  ml      Physical Exam  HENT:      Head: Normocephalic.      Right Ear: External ear normal.      Left Ear: External ear normal.      Nose: Nose normal.      Mouth/Throat:      Mouth: Mucous membranes are moist.   Eyes:      Pupils: Pupils are equal, round, and reactive to light.   Cardiovascular:      Rate and Rhythm: Normal rate.      Pulses: Normal pulses.   Pulmonary:      Effort: Pulmonary effort is normal.   Abdominal:      General: Abdomen is flat.      Tenderness: There is abdominal tenderness.      Comments: Laparotomy and Ostomy sites clear clean and intact  solid output per ileostomy    Skin:     General: Skin is warm.   Neurological:      Mental Status: She is alert.      Comments: Follows all commands  Minimal responses to questions but appropriate   Psychiatric:      Comments: Some confusion        Significant Labs:   Recent Labs   Lab 01/30/23  0330 01/31/23  0630 02/01/23  0302   WBC 14.43* 14.67* 15.74*   HGB 6.5* 8.1* 7.1*   HCT 19.3* 25.0* 21.2*   MCV 84 89 85   RBC 2.31* 2.82* 2.51*   MCH 28.1 28.7 28.3   MCHC 33.7 32.4 33.5   RDW 19.6* 19.5* 19.3*    298 278   MPV 9.9 10.3 9.8   GRAN 69.3  10.0* 67.0  9.9* 71.3  11.2*   LYMPH 12.0*  1.7 13.3*  2.0 11.5*  1.8   MONO 14.3  2.1* 14.9  2.2* 13.3  2.1*   EOSINOPHIL 0.8 0.7 0.8   BASOPHIL 0.6 1.2 0.9     Recent Labs   Lab 01/26/23  0636 01/27/23  2213 01/28/23  0425 01/30/23  0330 01/31/23  0630 02/01/23  0302   * 128*   < > 129* 132* 128*   K 4.8 4.8   < > 4.7 4.8 4.9    99   < > 97 97 95   CO2 16* 18*   < > 22* 24 22*   ANIONGAP 15 11   < > 10 11 11   BUN 67* 81*   < > 65* 50* 82*   CREATININE 5.6* 5.8*   < > 5.3* 4.6* 6.2*   GLU 97 218*   < > 199* 150* 177*   CALCIUM 8.4* 8.4*   < > 8.2* 8.9 8.7   PROT 6.4 6.5   < > 6.4 7.3 6.7   ALBUMIN 1.8* 1.8*   < > 1.7* 2.0* 1.9*   ALKPHOS 90 83   < > 72 70 71   BILITOT 0.4 0.3   < > 0.2 0.4 0.3   * 100*   < > 43 30 23   AST 34 24   < > 19 20 17   MG 2.2 2.0  --   --   --   --     PHOS 5.3* 4.0  --   --   --   --     < > = values in this interval not displayed.     No results for input(s): COLORU, APPEARANCEUA, PHUR, SPECGRAV, PROTEINUA, GLUCUA, KETONESU, BILIRUBINUA, OCCULTUA, UROBILINOGEN, NITRITE, LEUKOCYTESUR, RBCUA, WBCUA, BACTERIA, SQUAMEPITHEL, HYALINECASTS, GRANULARCAST, MICROCMT in the last 168 hours.    Invalid input(s): SPECIMENU, AMORPHOUSU  No results for input(s): TROPONINI, CPK, CPKMB in the last 168 hours.  No results for input(s): PT, INR, APTT in the last 168 hours.  No results for input(s): TSH, X8PXLUK, V3ZQMBO, THYROIDAB, FREET4 in the last 168 hours.  X-Ray Chest 1 View    Result Date: 1/27/2023  EXAMINATION: XR CHEST 1 VIEW CLINICAL HISTORY: Unspecified complication of cardiac and vascular prosthetic device, implant and graft, initial encounter TECHNIQUE: Single frontal view of the chest was performed. COMPARISON: 01/14/2023. FINDINGS: There is a dual lumen right internal jugular central venous catheter terminating in the brachiocephalic vein or high SVC.  There is a suspected slight kink in the distal aspect of the catheter.  The lungs are well expanded and clear.  The pleural spaces are clear.  The cardiac silhouette is unremarkable.  The visualized osseous structures demonstrate degenerative changes.     Slight kink within the distal aspect of the central venous catheter. Electronically signed by: Wyatt Martinez Date:    01/27/2023 Time:    23:32    CT Head Without Contrast    Result Date: 1/18/2023  EXAMINATION: CT HEAD WITHOUT CONTRAST CLINICAL HISTORY: Neuro deficit, acute, stroke suspected; TECHNIQUE: Low dose axial images were obtained through the head.  Coronal and sagittal reformations were also performed. Contrast was not administered. COMPARISON: 01/13/2023 FINDINGS: There is motion artifact. There is generalized cerebral volume loss.  There is hypoattenuation in a periventricular fashion, likely sequela of chronic microvascular ischemic change.  There is  no evidence of acute major vascular territory infarct, hemorrhage, or mass.  There is no hydrocephalus.  There are no abnormal extra-axial fluid collections.  The paranasal sinuses and mastoid air cells are clear, and there is no evidence of calvarial fracture.  The visualized soft tissues are unremarkable.     1. Allowing for motion artifact, no convincing acute intracranial abnormalities.  There is sequela of chronic microvascular ischemic change and senescent change. Electronically signed by: Neto Galvez MD Date:    01/18/2023 Time:    13:55    CT Head Without Contrast    Result Date: 1/13/2023  EXAMINATION: CT HEAD WITHOUT CONTRAST CLINICAL HISTORY: Subarachnoid hemorrhage (SAH) suspected;recent Fall at home, unwitnessed; TECHNIQUE: Low dose axial CT images obtained throughout the head without intravenous contrast. Sagittal and coronal reconstructions were performed. COMPARISON: None available. FINDINGS: There is age-related brain parenchymal volume loss.  There is no hydrocephalus.  There is hypoattenuation the supratentorial white matter which can be seen with mild microvascular ischemic changes.  No extra-axial blood or fluid collections.  No parenchymal mass, hemorrhage, edema or major vascular distribution infarct. No calvarial fracture.  The scalp is unremarkable.  Bilateral paranasal sinuses and mastoid air cells are clear.     No acute intracranial abnormality. Electronically signed by: Wyatt Martinez Date:    01/13/2023 Time:    23:30    CT Abdomen Pelvis With Contrast    Result Date: 1/21/2023  EXAMINATION: CT ABDOMEN PELVIS WITH CONTRAST CLINICAL HISTORY: Abdominal abscess/infection suspected; TECHNIQUE: Low dose axial images, sagittal and coronal reformations were obtained from the lung bases to the pubic symphysis following the IV administration of 75 mL of Omnipaque 350 without enteric contrast COMPARISON: Prior dated 01/16/2023 FINDINGS: Exam is limited by motion artifact. There is left pleural  fluid.  There is consolidation it the bilateral lung bases. There is a small volume of intraperitoneal free air consistent with recent exploratory laparotomy.  The liver is normal in size and contour.  The gallbladder is nondistended.  The portal vein is patent. The pancreas, spleen, and adrenal glands have a normal appearance. The left kidney upper pole demonstrates areas of cortical thinning with dystrophic calcification.  Right renal cortex is of normal thickness.  No definite solid renal mass. There is postsurgical change of subtotal colectomy with rectal stump.  There is a right lower quadrant enterostomy.  There is ill-defined soft tissue density about the antrostomy site within the subcutaneous fat which could reflect hematoma.  There is body wall edema.  There is high attenuation fluid and air which appears to be within the vagina which could be entering from the antritis or could reflect a rectovaginal fistula. The bladder is nondistended and not well evaluated.  It appears to demonstrate some high attenuation material and this could reflect hematuria.  Correlate with urinalysis. There is scattered complex fluid in the abdomen and pelvis, improved from prior exam consistent with evolving hemoperitoneum.  No adenopathy is seen. No osseous abnormalities identified.     1.  No definite intra-abdominal abscess identified.  Scattered non loculated peritoneal fluid is present of slightly high attenuation consistent with of all vein hemoperitoneum, decreased in volume compared to prior CT. 2.  Patchy consolidation at the lung bases bilaterally suspicious for pneumonia or aspiration pneumonitis. 3.  Left pleural fluid 4.  Postsurgical change of colectomy and right lower quadrant enterostomy.  No evidence of obstruction.  There is high attenuation within the subcutaneous fat about the enterostomy site most likely related to hematoma. 5.  High density material and air is seen within the vagina which could be related  to entry via the antritis or alternatively could reflect an enterovaginal fistula.  Correlate clinically. 6.  High attenuation fluid in the bladder which could be due to hematuria or debris.  Correlate with urinalysis. 7.  Small volume of intraperitoneal free air consistent with recent exploratory laparoscopy. This report was flagged in Epic as abnormal. Electronically signed by: Cristina Pepper MD Date:    01/21/2023 Time:    16:40    CT Abdomen Pelvis With Contrast    Result Date: 1/13/2023  EXAMINATION: CT ABDOMEN PELVIS WITH CONTRAST CLINICAL HISTORY: Abdominal abscess/infection suspected; TECHNIQUE: Low dose axial images, sagittal and coronal reformations were obtained from the lung bases to the pubic symphysis following the IV administration of 75 mL of Omnipaque 350 .  Oral contrast was not given. COMPARISON: None. FINDINGS: The lung bases are free of focal consolidations.  No pleural effusions are identified.  Bony structures appear intact.  There is no evidence for acute fracture or bone destruction.  There are degenerative changes within the lumbar spine.  There is grade 1 anterolisthesis of L4 on L5. The liver is normal in size and is homogeneous in density with no focal liver lesions identified.  The gallbladder is distended but otherwise appears unremarkable.  There is no evidence for intrahepatic or extrahepatic biliary dilatation.  The portal venous system is patent.  The spleen and pancreas both appear grossly unremarkable.  There is a small hiatal hernia.  The stomach otherwise appears grossly unremarkable.  The adrenal glands are not enlarged.  There is a subcentimeter hypodensity within the upper pole of the left kidney likely representing a small cyst but too small to adequately characterize.  There is a 9 mm nonobstructing calculus within the upper pole of the left kidney.  There is no evidence for hydronephrosis.  There is atherosclerotic calcification present within the abdominal aorta which  tapers normally without aneurysmal dilatation.  No para-aortic lymphadenopathy is identified.  The appendix is not confidently identified, however there are no CT findings to suggest appendicitis.  There is distension of the colon to the rectum with a large amount of stool and fluid throughout the colon and rectum.  There is a segment of colon at the junction of the descending colon and sigmoid colon that appears relatively narrow with probable circumferential wall thickening (image 136, series 2, image 84, series 601, image 99, series 602).  A colonic mass at this location should be strongly considered.  GI consultation is recommended.  The uterus is not visualized and is likely absent.  No abnormal adnexal masses are identified.  The urinary bladder is unremarkable.  There is no evidence for pelvic or inguinal lymphadenopathy.  There is a tiny umbilical hernia present.  No ascites is identified.     Prominent amount of stool throughout the rectum and sigmoid colon.  Fluid and stool distending the majority of the remainder of the colon with questionable segment of more normal caliber colon at the junction of the descending colon and sigmoid colon.  GI consultation is recommended. 9 mm nonobstructing calculus within the upper pole of the left kidney. Small hiatal hernia. Subcentimeter left upper pole renal cyst. Distended gallbladder which otherwise appears unremarkable. Electronically signed by: Derick Stahl MD Date:    01/13/2023 Time:    15:44    X-Ray Chest AP Portable    Result Date: 1/14/2023  EXAMINATION: XR CHEST AP PORTABLE CLINICAL HISTORY: Trialysis insertion;. TECHNIQUE: Single frontal portable view of the chest was performed. COMPARISON: January 14, 2023 at 09:00 FINDINGS: Support devices: NG tube and left internal jugular central venous catheter remain.  Right internal jugular central venous catheter has its tip overlying the expected location of the superior vena cava.  Surgical changes in the right  humerus remain. There has not been any detrimental change since January 14, 2023 at 09:00.     No detrimental change. Electronically signed by: Jackie Tavera MD Date:    01/14/2023 Time:    14:46    X-Ray Chest AP Portable    Result Date: 1/14/2023  EXAMINATION: XR CHEST AP PORTABLE CLINICAL HISTORY: ET tube placement; TECHNIQUE: Single frontal view of the chest was performed. COMPARISON: Chest radiograph performed 01/13/2023, 20:45 hours. FINDINGS: Tip of endotracheal tube projects approximately 41 mm above level yo.  Tip and side port of enteric tube projects subdiaphragmatic in location.  Grossly unchanged position left internal jugular approach central venous catheter.  Monitoring leads are noted. Grossly unchanged cardiomediastinal contours.  Lungs appear grossly clear.  No definite pneumothorax or large volume pleural effusion. Anticipated postoperative pneumoperitoneum noted. Remainder examination not substantially changed relative to 01/13/2023, 20:45 hours examination.     As above. Electronically signed by: Silver Funes Date:    01/14/2023 Time:    09:19    X-Ray Chest AP Portable    Result Date: 1/13/2023  EXAMINATION: XR CHEST AP PORTABLE CLINICAL HISTORY: Encounter for adjustment and management of vascular access device TECHNIQUE: Single frontal view of the chest was performed. COMPARISON: 01/13/2023. FINDINGS: There is a left internal jugular central venous catheter terminating high SVC.  The lungs are well expanded and clear.  The pleural spaces are clear.  No focal opacities are seen.  The cardiac silhouette is unremarkable.  There are calcifications of the aortic arch.  The visualized osseous structures demonstrate degenerative changes.  Right humeral head anchor screw.     Central venous catheter as above. Electronically signed by: Wyatt Martinez Date:    01/13/2023 Time:    21:11    X-Ray Chest AP Portable    Result Date: 1/13/2023  EXAMINATION: XR CHEST AP PORTABLE CLINICAL HISTORY:  Sepsis; TECHNIQUE: Single frontal view of the chest was performed. COMPARISON: None FINDINGS: The heart is not enlarged.  Atherosclerotic calcification is present within the aortic arch.  Pulmonary vasculature is within normal limits.  The lungs are well aerated and free of focal consolidations.  There is no evidence for pneumothorax or large pleural effusions.  There are prominent degenerative changes of the right shoulder.  A bone anchor is identified within the proximal right humerus.     No acute chest disease identified. Electronically signed by: Derick Stahl MD Date:    01/13/2023 Time:    14:23    DXA Bone Density Spine And Hip    Result Date: 1/3/2023  EXAMINATION: DEXA BONE DENSITY SPINE HIP CLINICAL HISTORY: Asymptomatic menopausal state TECHNIQUE: DXA scanning was performed over the left hip and lumbar spine.  Review of the images confirms satisfactory positioning and technique. FINDINGS: The L1 to L4 vertebral bone mineral density is equal to 1.15 g/cm squared with a T score of -0.3. The left femoral neck bone mineral density is equal to 1.19 g/cm squared with a T score of 1.1. There is a 7.2% risk of a major osteoporotic fracture and a 0.6% risk of hip fracture in the next 10 years (FRAX).     Lumbar spine normal bone density. Left femoral neck normal bone density. Electronically signed by: Derick Engle MD Date:    01/03/2023 Time:    13:11    XR NG/OG tube placement check, non-radiologist performed    Result Date: 1/14/2023  EXAMINATION: XR NG/OG TUBE PLACEMENT CHECK, NON-RADIOLOGIST PERFORMED CLINICAL HISTORY: ngt placement; TECHNIQUE: AP radiograph the chest. COMPARISON: None FINDINGS: There is nasogastric tube with the tip and side-port in the stomach.  There is a left internal jugular central venous catheter terminating in the high SVC.  There are overlying leads.  The lungs are well expanded.  There are chronic appearing interstitial opacities.  No large focal consolidation.  The cardiac  silhouette is unremarkable.  The pleural spaces are clear.  There are calcifications of the aortic arch.  Visualized osseous structures demonstrate degenerative changes.  There is an anchor screw overlying the right proximal humerus.     Nasogastric tube with the tip and side-port in the stomach as above. Electronically signed by: Wyatt Martinez Date:    01/14/2023 Time:    01:28    SURG FL Surgery Fluoro Usage    Result Date: 1/28/2023  See OP Notes for results. IMPRESSION: See OP Notes for results. This procedure was auto-finalized by: Virtual Radiologist    SURG FL Surgery Fluoro Usage    Result Date: 1/25/2023  See OP Notes for results. IMPRESSION: See OP Notes for results. This procedure was auto-finalized by: Virtual Radiologist    Echo    Result Date: 1/17/2023  · Concentric hypertrophy and · Normal right ventricular size with normal right ventricular systolic function. · The estimated ejection fraction is 35%. · Grade I left ventricular diastolic dysfunction. · Mild aortic regurgitation. · Mild mitral regurgitation. · Mild tricuspid regurgitation. · There is no pulmonary hypertension.      CTA Abdomen and Pelvis    Result Date: 1/16/2023  EXAMINATION: CTA ABDOMEN AND PELVIS CLINICAL HISTORY: GI bleed;Post colectomy; TECHNIQUE: CTA abdomen and pelvis performed per GI bleed protocol before and after administration of 130 mL Omnipaque 350 intravenous contrast. COMPARISON: 01/13/2023. FINDINGS: There are small bilateral pleural effusions. Liver and spleen are unremarkable.  Vicariously excreted contrast seen within the gallbladder.  No biliary dilatation.  Pancreas is unremarkable. There is hyperenhancement of the adrenal glands, which may be seen with shock.  Note made of nonobstructing 0.6 cm calculus in the left upper pole collecting system.  No hydronephrosis.  Retained contrast within the renal parenchyma in keeping with renal failure. Status post colectomy with left-sided ileostomy.  There is radiopaque  material within the small bowel lumen, likely vicariously excreted from prior study.  There is marked bowel wall hyperenhancement, consistent with shock.  There is no evidence for intraluminal active contrast extravasation or pooling.  Stomach is distended with air and fluid, noting fluid in the lower esophagus. There is a large volume of hemoperitoneum throughout the abdomen and pelvis.  There is active extravasation of arterial contrast in the left upper quadrant in keeping with rapid active hemorrhage.  Postoperative pneumoperitoneum noted. Abdominal aorta is normal caliber.  IVC is collapsed, in keeping with hypovolemic shock. Urinary bladder is decompressed. Regional osseous structures demonstrate no aggressive appearing lytic or blastic lesions.  Degenerative changes of the lumbar spine noted.     1. Active extravasation of arterial contrast in the left upper quadrant in keeping with rapid active hemorrhage.  Large volume of hemoperitoneum throughout the abdomen and pelvis. 2. Imaging findings of hypovolemic shock including bowel wall and adrenal gland hyperenhancement, and IVC collapse. 3. Stomach is distended with air and fluid, noting fluid in the lower esophagus. 4. Additional findings above. Findings observed at 10:45 and discussed with Dr. Javier at 11:02. Electronically signed by: Eduar Solitario MD Date:    01/16/2023 Time:    11:15    X-Ray KUB    Result Date: 1/16/2023  EXAMINATION: XR KUB CLINICAL HISTORY: distension, worsening pain, recent emergent total colectomy; TECHNIQUE: Single AP supine view of the abdomen (KUB) was performed COMPARISON: Abdominal radiograph 01/14/2023 FINDINGS: Seen closure staples in keeping reported recent surgery.  Suspect anticipated postoperative pneumoperitoneum. Gaseous distention of stomach.  Dilated gas-filled loop of small bowel measuring up to 5 cm left lower quadrant.  Findings could relate to ileus versus developing small bowel obstruction.  Attention on  follow-up.  Paucity of gas-filled bowel loops in the right hemiabdomen and pelvic region. No acute change in the visualized lung bases.     As above. Electronically signed by: Silver Funes Date:    01/16/2023 Time:    09:27    Microbiology Results (last 7 days)       ** No results found for the last 168 hours. **                Assessment/Plan:      * Problem with vascular access  Access non functional in HD  S/p removal of Right IJ line  Left IJ line placed  Plan  Cnt HD schedule MWF        Primary hypertension  Continue amlodipine      Hyponatremia  Pt remains hyponatremic, potentially 2/2 to volume overload, missed HD    Plan  Follow nephro Recs  Fix HD access to resume HD      ATN (acute tubular necrosis)  Likely 2/2 to shock from prior admission  Tunnel cath placed 1/25/23  Line became blocked at HD on 1/25/23 and 1/27/23    Plan  -Line replaced  -Strict I&Os,maintain euvolemia   -Avoid renal toxic meds   -Continue to monitor renal status and urine output   -Nephro consulted. Appreciate recs. HD resumed on 1/28/23  - Awaiting authorization to return to LTAC      Large bowel ischemia  #Gram negative Bacteremia  -CT Abd shows possible hematoma but no signs fo acute infection or abscess   - Gram negative rods reported in Blood culture from 1/13  WBC improved to 43 form 45 1/22/23    Plan  - Continue Cefepime, flagyl for total of 14 day course End date 1/28/23  -  All further cultures on 1/17 w/ NGT  - afebrile  - PT/OT        Idiopathic neuropathy  Holding home lyrica due to kidney function  Plan  Discuss alternative treatment options with Nephro  PRN cold water foot path      Type 2 diabetes mellitus with diabetic polyneuropathy, without long-term current use of insulin  SSI  Detemir 7U nightly  POCT BG QID with meals      VTE Risk Mitigation (From admission, onward)         Ordered     heparin (porcine) injection 2,400 Units  As needed (PRN)         01/27/23 0053                Discharge Planning   CAMPBELL:  1/31/2023     Code Status: Full Code   Is the patient medically ready for discharge?:     Reason for patient still in hospital (select all that apply): Patient trending condition  Discharge Plan A: Long-term acute care facility (LTAC)        Deng Hollingsworth MD  Department of [facility] Medicine   [facility]

## 2023-02-01 NOTE — ASSESSMENT & PLAN NOTE
Access non functional in HD  S/p removal of Right IJ line  Left IJ line placed  Plan  Cnt HD schedule MWF

## 2023-02-01 NOTE — PLAN OF CARE
Problem: Physical Therapy  Goal: Physical Therapy Goal  Description: Goals to be met by: 23     Patient will increase functional independence with mobility by performin. Supine to sit with Stand-by Assistance  2. Sit to supine with Stand-by Assistance  3. Sit to stand transfer with Stand-by Assistance  4. Bed to chair transfer with Stand-by Assistance using LRAD.  5. Gait  x 25 feet with Contact Guard Assistance using LRAD.     Outcome: Ongoing, Progressing   Pt continues to work toward all goals. Able to perform ambulation training ~150 ft with RW requiring min/close CGA and occasional assistance for RW management. Pt quiet throughout session only answering questions when asked by PTA.

## 2023-02-01 NOTE — PT/OT/SLP PROGRESS
Physical Therapy Treatment    Patient Name:  Lily Green   MRN:  [id number]    Recommendations:     Discharge Recommendations:  (LTACH vs SNF Post Acute Therapy)  Discharge Equipment Recommendations:  (TBD)  Barriers to discharge:  Requires increased assistance with all functional mobility    Assessment:     Lily Green is a 72 y.o. female admitted with a medical diagnosis of Problem with vascular access.  She presents with the following impairments/functional limitations: weakness, impaired endurance, impaired self care skills, impaired functional mobility, gait instability, impaired balance, impaired cognition, decreased coordination, decreased upper extremity function, decreased lower extremity function, decreased safety awareness, pain, decreased ROM, impaired fine motor, impaired coordination. Pt able to perform ambulation training ~150 ft with RW requiring min/close CGA. Required constant verbal cueing to continue with tasks performing at any given time. Recommending post acute placement with therapy upon discharge.    Rehab Prognosis: Fair; patient would benefit from acute skilled PT services to address these deficits and reach maximum level of function.    Recent Surgery: Procedure(s) (LRB):  Insertion,catheter,tunneled (Left)  REMOVAL, CATHETER (Right) 4 Days Post-Op    Plan:     During this hospitalization, patient to be seen 5 x/week to address the identified rehab impairments via gait training, therapeutic activities, therapeutic exercises, neuromuscular re-education and progress toward the following goals:    Plan of Care Expires:  02/28/23    Subjective     Chief Complaint: None Expressed  Patient/Family Comments/goals: None Expressed  Pain/Comfort:  Pain Rating 1:  (Does not rate. Feet are sensitive to touch secondary to neuropathy)  Location - Side 1: Bilateral  Location - Orientation 1: generalized  Location 1: foot  Pain Addressed 1: Reposition, Cessation of Activity  Pain  Rating Post-Intervention 1:  (Not rated)      Objective:     Communicated with nurse prior to session.  Patient found supine with bed alarm, colostomy, telemetry upon PT entry to room.     General Precautions: Standard, fall  Orthopedic Precautions: N/A  Braces: N/A  Respiratory Status: Room air     Functional Mobility:  Bed Mobility:     Rolling Left:  stand by assistance and contact guard assistance  Scooting: stand by assistance  Supine to Sit: contact guard assistance and with pot using bed rail and verbal cueing to continue through with movement  Sit to Supine: minimum assistance and assistance to advance BLE's  Transfers:     Sit to Stand:  contact guard assistance and minimum assistance with rolling walker  Gait: ~150 ft with RW requiring min/close CGA with verbal/tactile cueing for RW management      AM-PAC 6 CLICK MOBILITY  Turning over in bed (including adjusting bedclothes, sheets and blankets)?: 3  Sitting down on and standing up from a chair with arms (e.g., wheelchair, bedside commode, etc.): 3  Moving from lying on back to sitting on the side of the bed?: 3  Moving to and from a bed to a chair (including a wheelchair)?: 3  Need to walk in hospital room?: 3  Climbing 3-5 steps with a railing?: 2  Basic Mobility Total Score: 17       Treatment & Education:  Pt awake upon entering room. Answers questions appropriately when asked by PTA but is quiet throughout session otherwise. 2 sit to stand trials with RW requiring CGA/min A. Pt able to statically stand  within RW boundaries requiring CGA in order for bed linens to be changed/straightened. Able to perform ambulation training ~150 ft with RW requiring min/CGA with verbal/tactile cueing for RW management.  1 x 10 reps BLE's of hip/knee flexion (marching) within RW boundaries requiring CGA plus verbal cueing to increase ROM as pt with decreased foot to floor clearance. Seated rest breaks needed between all therapeutic activities performed.    .  Patient  left supine with all lines intact, call button in reach, bed alarm on, nurse notified, and pt's breakfast set up for pt to eat ..    GOALS:   Multidisciplinary Problems       Physical Therapy Goals          Problem: Physical Therapy    Goal Priority Disciplines Outcome Goal Variances Interventions   Physical Therapy Goal     PT, PT/OT Ongoing, Progressing     Description: Goals to be met by: 23     Patient will increase functional independence with mobility by performin. Supine to sit with Stand-by Assistance  2. Sit to supine with Stand-by Assistance  3. Sit to stand transfer with Stand-by Assistance  4. Bed to chair transfer with Stand-by Assistance using LRAD.  5. Gait  x 25 feet with Contact Guard Assistance using LRAD.                          Time Tracking:     PT Received On: 23  PT Start Time: 15     PT Stop Time: 49  PT Total Time (min): 34 min     Billable Minutes: Gait Training 20 and Therapeutic Activity 14    Treatment Type: Treatment  PT/PTA: PTA     PTA Visit Number: 3     2023

## 2023-02-01 NOTE — NURSING
Discharge orders noted. IV requested to leave in per LTAC. Tele box removed. AVS printed and placed in discharge packet. Report called to LTAC facility. Patient left via stretcher with [facility] . No distress noted.

## 2023-02-02 NOTE — HOSPITAL COURSE
Patient admitted form LTAC for nonfunctional permacath. Patient had RIJ removed and LIJ line placed by gen surg. Patient is now s/p HD x2 through new line with no issues. Patient menation waxes and wanes with HD . Patient main complaint is neuropathy, former medications stopped due to poor renal function. Patient required 1 unit Prbc on this admission with appropriate response. Patient is stable for discharge back to LTAC.

## 2023-02-02 NOTE — DISCHARGE SUMMARY
[facility] Medicine  Discharge Summary      Patient Name: Lily Green  MRN: [id number]  ANDER: [id number]  Patient Class: OP- Observation  Admission Date: 1/27/2023  Hospital Length of Stay: 0 days  Discharge Date and Time: 2/1/2023  2:49 PM  Attending Physician: No att. providers found   Discharging Provider: Deng Hollingsworth MD  Primary Care Provider: Pavel Calixto MD    Primary Care Team: Networked reference to record PCT     HPI:   Patient is a 73 y/o female with PMHx of diabetes type 2 (treated with metformin), HTN, and celiac disease (following a strict diet), Acute kidney failure on HD, ischemic colitis s/p total cholectomy who represented to [facility] one day after discharge due to issues with her tunneeled HD catherter while in LTAC.  On previous admission patient was noted to have severe peritonitis secondary to ischemic bowel necrosis.  Underwent total colectomy.  Subsequently developed brisk postop bleed with severe blood loss.  Bleeding was stopped with subsequent ex lap however patient developed worsening ATN and was initiated on CRRT at the time.  When patient was hemodynamically stable, received tunnel cath placement on 01/25/2023.  Initial HD run using tunneled catheter was stopped early due to blockage.  It was suspected that time to be from vasospasm.  Subsequent HD was performed successfully on 01/26/2023 and patient was discharged to [facility] to continue Monday Wednesday Friday dialysis, PTOT and to finish 14 day course of antibiotics.  While at [facility], patient was clinically stable however when dialysis was attempted on Friday 01/27/2023, line was noted to be blocked and clotted.  Nephrology at [facility] recommended patient to return to [facility] for line revision.    Upon arrival to the ED, the patient appears clinically stable but chronically ill.  Mentation and clinical status at baseline per family in the room consistent with patient's status at discharge from hospital 1 day  prior.  Labs drawn in ED did not reveal emergent need for dialysis.  Electrolytes and kidney function appears stable from previous labs.  However, patient's hemoglobin noted to be 6.8 down from 8.5 yesterday.  Patient has no obvious sources of bleeding at this time.  Is hemodynamically stable.  Patient was typed and screened and consented however no blood products were given until dialysis access could be established.  Nephrology was consulted and recommended to fix access in the morning as patient does not have emergent need for dialysis at this time.  Patient was admitted to [facility] for replacement of tunneled dialysis line and asymptomatic anemia.      Procedure(s) (LRB):  Insertion,catheter,tunneled (Left)  REMOVAL, CATHETER (Right)      Hospital Course:   Patient admitted form LTAC for nonfunctional permacath. Patient had RIJ removed and LIJ line placed by gen surg. Patient is now s/p HD x2 through new line with no issues. Patient menation waxes and wanes with HD . Patient main complaint is neuropathy, former medications stopped due to poor renal function. Patient required 1 unit Prbc on this admission with appropriate response. Patient is stable for discharge back to LTAC.       Goals of Care Treatment Preferences:  Code Status: Full Code      Consults:   Consults (From admission, onward)        Status Ordering Provider     Inpatient consult to Nephrology-[facility]  Once        Provider:  (Not yet assigned)    JUNAID Bliss          No new Assessment & Plan notes have been filed under this hospital service since the last note was generated.  Service: Hospital Medicine    Final Active Diagnoses:    Diagnosis Date Noted POA    PRINCIPAL PROBLEM:  Problem with vascular access [Z78.9] 01/28/2023 Yes    Anemia [D64.9] 01/30/2023 Yes    Hypophosphatemia [E83.39] 01/30/2023 Yes    Primary hypertension [I10] 01/24/2023 Yes    Hyponatremia [E87.1] 01/22/2023 Yes    ATN (acute tubular necrosis)  [N17.0] 01/15/2023 Yes    Large bowel ischemia [K55.9] 01/15/2023 Yes    Idiopathic neuropathy [G60.9] 11/19/2021 Yes    Type 2 diabetes mellitus with diabetic polyneuropathy, without long-term current use of insulin [E11.42] 10/17/2019 Yes      Problems Resolved During this Admission:       Discharged Condition: stable    Disposition: Skilled Nursing Facility    Follow Up:    Patient Instructions:      Diet renal     Notify your health care provider if you experience any of the following:  temperature >100.4     Notify your health care provider if you experience any of the following:  persistent nausea and vomiting or diarrhea     Notify your health care provider if you experience any of the following:  severe uncontrolled pain     Notify your health care provider if you experience any of the following:  redness, tenderness, or signs of infection (pain, swelling, redness, odor or green/yellow discharge around incision site)     Notify your health care provider if you experience any of the following:  difficulty breathing or increased cough     Notify your health care provider if you experience any of the following:  severe persistent headache     Notify your health care provider if you experience any of the following:  worsening rash     Notify your health care provider if you experience any of the following:  persistent dizziness, light-headedness, or visual disturbances     Notify your health care provider if you experience any of the following:  increased confusion or weakness     Activity as tolerated       Significant Diagnostic Studies:   Recent Labs   Lab 01/30/23  0330 01/31/23  0630 02/01/23  0302   WBC 14.43* 14.67* 15.74*   HGB 6.5* 8.1* 7.1*   HCT 19.3* 25.0* 21.2*   MCV 84 89 85   RBC 2.31* 2.82* 2.51*   MCH 28.1 28.7 28.3   MCHC 33.7 32.4 33.5   RDW 19.6* 19.5* 19.3*    298 278   MPV 9.9 10.3 9.8   GRAN 69.3  10.0* 67.0  9.9* 71.3  11.2*   LYMPH 12.0*  1.7 13.3*  2.0 11.5*  1.8   MONO  14.3  2.1* 14.9  2.2* 13.3  2.1*   EOSINOPHIL 0.8 0.7 0.8   BASOPHIL 0.6 1.2 0.9     Recent Labs   Lab 01/27/23  2213 01/28/23  0425 01/30/23  0330 01/31/23  0630 02/01/23  0302   *   < > 129* 132* 128*   K 4.8   < > 4.7 4.8 4.9   CL 99   < > 97 97 95   CO2 18*   < > 22* 24 22*   ANIONGAP 11   < > 10 11 11   BUN 81*   < > 65* 50* 82*   CREATININE 5.8*   < > 5.3* 4.6* 6.2*   *   < > 199* 150* 177*   CALCIUM 8.4*   < > 8.2* 8.9 8.7   PROT 6.5   < > 6.4 7.3 6.7   ALBUMIN 1.8*   < > 1.7* 2.0* 1.9*   ALKPHOS 83   < > 72 70 71   BILITOT 0.3   < > 0.2 0.4 0.3   *   < > 43 30 23   AST 24   < > 19 20 17   MG 2.0  --   --   --   --    PHOS 4.0  --   --   --   --     < > = values in this interval not displayed.     No results for input(s): COLORU, APPEARANCEUA, PHUR, SPECGRAV, PROTEINUA, GLUCUA, KETONESU, BILIRUBINUA, OCCULTUA, UROBILINOGEN, NITRITE, LEUKOCYTESUR, RBCUA, WBCUA, BACTERIA, SQUAMEPITHEL, HYALINECASTS, GRANULARCAST, MICROCMT in the last 168 hours.    Invalid input(s): SPECIMENU, AMORPHOUSU  No results for input(s): TROPONINI, CPK, CPKMB in the last 168 hours.  No results for input(s): PT, INR, APTT in the last 168 hours.  No results for input(s): TSH, Q7MKDJO, P2PCBTT, THYROIDAB, FREET4 in the last 168 hours.  X-Ray Chest 1 View    Result Date: 1/27/2023  EXAMINATION: XR CHEST 1 VIEW CLINICAL HISTORY: Unspecified complication of cardiac and vascular prosthetic device, implant and graft, initial encounter TECHNIQUE: Single frontal view of the chest was performed. COMPARISON: 01/14/2023. FINDINGS: There is a dual lumen right internal jugular central venous catheter terminating in the brachiocephalic vein or high SVC.  There is a suspected slight kink in the distal aspect of the catheter.  The lungs are well expanded and clear.  The pleural spaces are clear.  The cardiac silhouette is unremarkable.  The visualized osseous structures demonstrate degenerative changes.     Slight kink within the  distal aspect of the central venous catheter. Electronically signed by: Wyatt Martinez Date:    01/27/2023 Time:    23:32    CT Head Without Contrast    Result Date: 1/18/2023  EXAMINATION: CT HEAD WITHOUT CONTRAST CLINICAL HISTORY: Neuro deficit, acute, stroke suspected; TECHNIQUE: Low dose axial images were obtained through the head.  Coronal and sagittal reformations were also performed. Contrast was not administered. COMPARISON: 01/13/2023 FINDINGS: There is motion artifact. There is generalized cerebral volume loss.  There is hypoattenuation in a periventricular fashion, likely sequela of chronic microvascular ischemic change.  There is no evidence of acute major vascular territory infarct, hemorrhage, or mass.  There is no hydrocephalus.  There are no abnormal extra-axial fluid collections.  The paranasal sinuses and mastoid air cells are clear, and there is no evidence of calvarial fracture.  The visualized soft tissues are unremarkable.     1. Allowing for motion artifact, no convincing acute intracranial abnormalities.  There is sequela of chronic microvascular ischemic change and senescent change. Electronically signed by: Neto Galvze MD Date:    01/18/2023 Time:    13:55    CT Head Without Contrast    Result Date: 1/13/2023  EXAMINATION: CT HEAD WITHOUT CONTRAST CLINICAL HISTORY: Subarachnoid hemorrhage (SAH) suspected;recent Fall at home, unwitnessed; TECHNIQUE: Low dose axial CT images obtained throughout the head without intravenous contrast. Sagittal and coronal reconstructions were performed. COMPARISON: None available. FINDINGS: There is age-related brain parenchymal volume loss.  There is no hydrocephalus.  There is hypoattenuation the supratentorial white matter which can be seen with mild microvascular ischemic changes.  No extra-axial blood or fluid collections.  No parenchymal mass, hemorrhage, edema or major vascular distribution infarct. No calvarial fracture.  The scalp is unremarkable.   Bilateral paranasal sinuses and mastoid air cells are clear.     No acute intracranial abnormality. Electronically signed by: Wyatt Martinez Date:    01/13/2023 Time:    23:30    CT Abdomen Pelvis With Contrast    Result Date: 1/21/2023  EXAMINATION: CT ABDOMEN PELVIS WITH CONTRAST CLINICAL HISTORY: Abdominal abscess/infection suspected; TECHNIQUE: Low dose axial images, sagittal and coronal reformations were obtained from the lung bases to the pubic symphysis following the IV administration of 75 mL of Omnipaque 350 without enteric contrast COMPARISON: Prior dated 01/16/2023 FINDINGS: Exam is limited by motion artifact. There is left pleural fluid.  There is consolidation it the bilateral lung bases. There is a small volume of intraperitoneal free air consistent with recent exploratory laparotomy.  The liver is normal in size and contour.  The gallbladder is nondistended.  The portal vein is patent. The pancreas, spleen, and adrenal glands have a normal appearance. The left kidney upper pole demonstrates areas of cortical thinning with dystrophic calcification.  Right renal cortex is of normal thickness.  No definite solid renal mass. There is postsurgical change of subtotal colectomy with rectal stump.  There is a right lower quadrant enterostomy.  There is ill-defined soft tissue density about the antrostomy site within the subcutaneous fat which could reflect hematoma.  There is body wall edema.  There is high attenuation fluid and air which appears to be within the vagina which could be entering from the antritis or could reflect a rectovaginal fistula. The bladder is nondistended and not well evaluated.  It appears to demonstrate some high attenuation material and this could reflect hematuria.  Correlate with urinalysis. There is scattered complex fluid in the abdomen and pelvis, improved from prior exam consistent with evolving hemoperitoneum.  No adenopathy is seen. No osseous abnormalities identified.     1.   No definite intra-abdominal abscess identified.  Scattered non loculated peritoneal fluid is present of slightly high attenuation consistent with of all vein hemoperitoneum, decreased in volume compared to prior CT. 2.  Patchy consolidation at the lung bases bilaterally suspicious for pneumonia or aspiration pneumonitis. 3.  Left pleural fluid 4.  Postsurgical change of colectomy and right lower quadrant enterostomy.  No evidence of obstruction.  There is high attenuation within the subcutaneous fat about the enterostomy site most likely related to hematoma. 5.  High density material and air is seen within the vagina which could be related to entry via the antritis or alternatively could reflect an enterovaginal fistula.  Correlate clinically. 6.  High attenuation fluid in the bladder which could be due to hematuria or debris.  Correlate with urinalysis. 7.  Small volume of intraperitoneal free air consistent with recent exploratory laparoscopy. This report was flagged in Epic as abnormal. Electronically signed by: Cristina Pepper MD Date:    01/21/2023 Time:    16:40    CT Abdomen Pelvis With Contrast    Result Date: 1/13/2023  EXAMINATION: CT ABDOMEN PELVIS WITH CONTRAST CLINICAL HISTORY: Abdominal abscess/infection suspected; TECHNIQUE: Low dose axial images, sagittal and coronal reformations were obtained from the lung bases to the pubic symphysis following the IV administration of 75 mL of Omnipaque 350 .  Oral contrast was not given. COMPARISON: None. FINDINGS: The lung bases are free of focal consolidations.  No pleural effusions are identified.  Bony structures appear intact.  There is no evidence for acute fracture or bone destruction.  There are degenerative changes within the lumbar spine.  There is grade 1 anterolisthesis of L4 on L5. The liver is normal in size and is homogeneous in density with no focal liver lesions identified.  The gallbladder is distended but otherwise appears unremarkable.   There is no evidence for intrahepatic or extrahepatic biliary dilatation.  The portal venous system is patent.  The spleen and pancreas both appear grossly unremarkable.  There is a small hiatal hernia.  The stomach otherwise appears grossly unremarkable.  The adrenal glands are not enlarged.  There is a subcentimeter hypodensity within the upper pole of the left kidney likely representing a small cyst but too small to adequately characterize.  There is a 9 mm nonobstructing calculus within the upper pole of the left kidney.  There is no evidence for hydronephrosis.  There is atherosclerotic calcification present within the abdominal aorta which tapers normally without aneurysmal dilatation.  No para-aortic lymphadenopathy is identified.  The appendix is not confidently identified, however there are no CT findings to suggest appendicitis.  There is distension of the colon to the rectum with a large amount of stool and fluid throughout the colon and rectum.  There is a segment of colon at the junction of the descending colon and sigmoid colon that appears relatively narrow with probable circumferential wall thickening (image 136, series 2, image 84, series 601, image 99, series 602).  A colonic mass at this location should be strongly considered.  GI consultation is recommended.  The uterus is not visualized and is likely absent.  No abnormal adnexal masses are identified.  The urinary bladder is unremarkable.  There is no evidence for pelvic or inguinal lymphadenopathy.  There is a tiny umbilical hernia present.  No ascites is identified.     Prominent amount of stool throughout the rectum and sigmoid colon.  Fluid and stool distending the majority of the remainder of the colon with questionable segment of more normal caliber colon at the junction of the descending colon and sigmoid colon.  GI consultation is recommended. 9 mm nonobstructing calculus within the upper pole of the left kidney. Small hiatal hernia.  Subcentimeter left upper pole renal cyst. Distended gallbladder which otherwise appears unremarkable. Electronically signed by: Derick Stahl MD Date:    01/13/2023 Time:    15:44    X-Ray Chest AP Portable    Result Date: 1/14/2023  EXAMINATION: XR CHEST AP PORTABLE CLINICAL HISTORY: Trialysis insertion;. TECHNIQUE: Single frontal portable view of the chest was performed. COMPARISON: January 14, 2023 at 09:00 FINDINGS: Support devices: NG tube and left internal jugular central venous catheter remain.  Right internal jugular central venous catheter has its tip overlying the expected location of the superior vena cava.  Surgical changes in the right humerus remain. There has not been any detrimental change since January 14, 2023 at 09:00.     No detrimental change. Electronically signed by: Jackie Tavera MD Date:    01/14/2023 Time:    14:46    X-Ray Chest AP Portable    Result Date: 1/14/2023  EXAMINATION: XR CHEST AP PORTABLE CLINICAL HISTORY: ET tube placement; TECHNIQUE: Single frontal view of the chest was performed. COMPARISON: Chest radiograph performed 01/13/2023, 20:45 hours. FINDINGS: Tip of endotracheal tube projects approximately 41 mm above level yo.  Tip and side port of enteric tube projects subdiaphragmatic in location.  Grossly unchanged position left internal jugular approach central venous catheter.  Monitoring leads are noted. Grossly unchanged cardiomediastinal contours.  Lungs appear grossly clear.  No definite pneumothorax or large volume pleural effusion. Anticipated postoperative pneumoperitoneum noted. Remainder examination not substantially changed relative to 01/13/2023, 20:45 hours examination.     As above. Electronically signed by: Silver Funes Date:    01/14/2023 Time:    09:19    X-Ray Chest AP Portable    Result Date: 1/13/2023  EXAMINATION: XR CHEST AP PORTABLE CLINICAL HISTORY: Encounter for adjustment and management of vascular access device TECHNIQUE: Single frontal view  of the chest was performed. COMPARISON: 01/13/2023. FINDINGS: There is a left internal jugular central venous catheter terminating high SVC.  The lungs are well expanded and clear.  The pleural spaces are clear.  No focal opacities are seen.  The cardiac silhouette is unremarkable.  There are calcifications of the aortic arch.  The visualized osseous structures demonstrate degenerative changes.  Right humeral head anchor screw.     Central venous catheter as above. Electronically signed by: Wyatt Martinez Date:    01/13/2023 Time:    21:11    X-Ray Chest AP Portable    Result Date: 1/13/2023  EXAMINATION: XR CHEST AP PORTABLE CLINICAL HISTORY: Sepsis; TECHNIQUE: Single frontal view of the chest was performed. COMPARISON: None FINDINGS: The heart is not enlarged.  Atherosclerotic calcification is present within the aortic arch.  Pulmonary vasculature is within normal limits.  The lungs are well aerated and free of focal consolidations.  There is no evidence for pneumothorax or large pleural effusions.  There are prominent degenerative changes of the right shoulder.  A bone anchor is identified within the proximal right humerus.     No acute chest disease identified. Electronically signed by: Derick Stahl MD Date:    01/13/2023 Time:    14:23    XR NG/OG tube placement check, non-radiologist performed    Result Date: 1/14/2023  EXAMINATION: XR NG/OG TUBE PLACEMENT CHECK, NON-RADIOLOGIST PERFORMED CLINICAL HISTORY: ngt placement; TECHNIQUE: AP radiograph the chest. COMPARISON: None FINDINGS: There is nasogastric tube with the tip and side-port in the stomach.  There is a left internal jugular central venous catheter terminating in the high SVC.  There are overlying leads.  The lungs are well expanded.  There are chronic appearing interstitial opacities.  No large focal consolidation.  The cardiac silhouette is unremarkable.  The pleural spaces are clear.  There are calcifications of the aortic arch.  Visualized osseous  structures demonstrate degenerative changes.  There is an anchor screw overlying the right proximal humerus.     Nasogastric tube with the tip and side-port in the stomach as above. Electronically signed by: Wyatt Martinez Date:    01/14/2023 Time:    01:28    SURG FL Surgery Fluoro Usage    Result Date: 1/28/2023  See OP Notes for results. IMPRESSION: See OP Notes for results. This procedure was auto-finalized by: Virtual Radiologist    SURG FL Surgery Fluoro Usage    Result Date: 1/25/2023  See OP Notes for results. IMPRESSION: See OP Notes for results. This procedure was auto-finalized by: Virtual Radiologist    Echo    Result Date: 1/17/2023  · Concentric hypertrophy and · Normal right ventricular size with normal right ventricular systolic function. · The estimated ejection fraction is 35%. · Grade I left ventricular diastolic dysfunction. · Mild aortic regurgitation. · Mild mitral regurgitation. · Mild tricuspid regurgitation. · There is no pulmonary hypertension.      CTA Abdomen and Pelvis    Result Date: 1/16/2023  EXAMINATION: CTA ABDOMEN AND PELVIS CLINICAL HISTORY: GI bleed;Post colectomy; TECHNIQUE: CTA abdomen and pelvis performed per GI bleed protocol before and after administration of 130 mL Omnipaque 350 intravenous contrast. COMPARISON: 01/13/2023. FINDINGS: There are small bilateral pleural effusions. Liver and spleen are unremarkable.  Vicariously excreted contrast seen within the gallbladder.  No biliary dilatation.  Pancreas is unremarkable. There is hyperenhancement of the adrenal glands, which may be seen with shock.  Note made of nonobstructing 0.6 cm calculus in the left upper pole collecting system.  No hydronephrosis.  Retained contrast within the renal parenchyma in keeping with renal failure. Status post colectomy with left-sided ileostomy.  There is radiopaque material within the small bowel lumen, likely vicariously excreted from prior study.  There is marked bowel wall  hyperenhancement, consistent with shock.  There is no evidence for intraluminal active contrast extravasation or pooling.  Stomach is distended with air and fluid, noting fluid in the lower esophagus. There is a large volume of hemoperitoneum throughout the abdomen and pelvis.  There is active extravasation of arterial contrast in the left upper quadrant in keeping with rapid active hemorrhage.  Postoperative pneumoperitoneum noted. Abdominal aorta is normal caliber.  IVC is collapsed, in keeping with hypovolemic shock. Urinary bladder is decompressed. Regional osseous structures demonstrate no aggressive appearing lytic or blastic lesions.  Degenerative changes of the lumbar spine noted.     1. Active extravasation of arterial contrast in the left upper quadrant in keeping with rapid active hemorrhage.  Large volume of hemoperitoneum throughout the abdomen and pelvis. 2. Imaging findings of hypovolemic shock including bowel wall and adrenal gland hyperenhancement, and IVC collapse. 3. Stomach is distended with air and fluid, noting fluid in the lower esophagus. 4. Additional findings above. Findings observed at 10:45 and discussed with Dr. Javier at 11:02. Electronically signed by: Eduar Solitario MD Date:    01/16/2023 Time:    11:15    X-Ray KUB    Result Date: 1/16/2023  EXAMINATION: XR KUB CLINICAL HISTORY: distension, worsening pain, recent emergent total colectomy; TECHNIQUE: Single AP supine view of the abdomen (KUB) was performed COMPARISON: Abdominal radiograph 01/14/2023 FINDINGS: Seen closure staples in keeping reported recent surgery.  Suspect anticipated postoperative pneumoperitoneum. Gaseous distention of stomach.  Dilated gas-filled loop of small bowel measuring up to 5 cm left lower quadrant.  Findings could relate to ileus versus developing small bowel obstruction.  Attention on follow-up.  Paucity of gas-filled bowel loops in the right hemiabdomen and pelvic region. No acute change in the  visualized lung bases.     As above. Electronically signed by: Silver Funes Date:    01/16/2023 Time:    09:27    Microbiology Results (last 7 days)     ** No results found for the last 168 hours. **            Pending Diagnostic Studies:     None         Medications:  Reconciled Home Medications:      Medication List      CHANGE how you take these medications    metFORMIN 500 MG tablet  Commonly known as: GLUCOPHAGE  Take 1 tablet (500 mg total) by mouth 3 (three) times daily. 2 po qam, 1 po qhs.  What changed:   · when to take this  · additional instructions     pregabalin 25 MG capsule  Commonly known as: LYRICA  Take 1 capsule (25 mg total) by mouth 2 (two) times daily.  What changed:   · medication strength  · how much to take        CONTINUE taking these medications    alpha lipoic acid 600 mg Cap     atorvastatin 40 MG tablet  Commonly known as: LIPITOR  TAKE 1 TABLET BY MOUTH ONCE DAILY     BETAHISTINE (BULK) MISC     blood-glucose meter kit  Use to test twice a day     CALTRATE WITH VITAMIN D3 ORAL  Take by mouth.     CINNAMON ORAL  Take by mouth.     coenzyme Q10 100 mg capsule     cyanocobalamin 1,000 mcg/mL injection  Inject 1 mL (1,000 mcg total) into the muscle every 30 days.     dicyclomine 10 MG capsule  Commonly known as: BENTYL     DULoxetine 30 MG capsule  Commonly known as: CYMBALTA  Take 1 capsule (30 mg total) by mouth 2 (two) times daily.     econazole nitrate 1 % cream  Apply topically 2 (two) times daily.     finasteride 5 mg tablet  Commonly known as: PROSCAR     glucosamine HCl 1,500 mg Tab  Take 1,500 mg by mouth.     KRILL OIL ORAL  Take by mouth.     lancets Misc  1 lancet by Misc.(Non-Drug; Combo Route) route once daily.     meclizine 25 mg tablet  Commonly known as: ANTIVERT  Take 1 tablet (25 mg total) by mouth 3 (three) times daily as needed for Dizziness.     MILK THISTLE ORAL  Take by mouth.     pantoprazole 40 MG tablet  Commonly known as: PROTONIX  TAKE 1 TABLET BY MOUTH ONCE  DAILY     temazepam 30 mg capsule  Commonly known as: RESTORIL  TAKE ONE CAPSULE BY MOUTH NIGHTLY AS NEEDED FOR INSOMNIA     triamcinolone acetonide 0.1% 0.1 % ointment  Commonly known as: KENALOG  Apply topically 2 (two) times daily as needed (rash).     triamterene-hydrochlorothiazide 37.5-25 mg 37.5-25 mg per tablet  Commonly known as: MAXZIDE-25     TRUE METRIX GLUCOSE TEST STRIP Strp  Generic drug: blood sugar diagnostic  USE ONE STRIP FOR TESTING 2 TIMES A DAY     vit C,O-Bl-cyzvw-lutein-zeaxan 250-90-40-1 mg Cap  Commonly known as: PRESERVISION AREDS 2     vitamins A,C,E-zinc-copper 14,320-226-200 unit-mg-unit Cap  Take 1 tablet by mouth once daily.     ZYRTEC ORAL        STOP taking these medications    flaxseed oiL 1,000 mg Cap     gluc-condr-om3-dha-epa-fish-st 385-948-36-54 mg Cap        ASK your doctor about these medications    iron sucrose 200 mg iron/10 mL Soln injection  Commonly known as: VENOFER  Inject 5 mLs (100 mg total) into the vein once. for 1 dose  Ask about: Should I take this medication?            Indwelling Lines/Drains at time of discharge:   Lines/Drains/Airways     Central Venous Catheter Line  Duration                Hemodialysis Catheter 01/25/23 1501 right internal jugular 7 days          Drain  Duration                Ileostomy 01/16/23 1230 RLQ 17 days          Airway  Duration                Airway - Non-Surgical 01/28/23 1056 Nasal Cannula 5 days                Time spent on the discharge of patient: 30 minutes         Deng Hollingsworth MD  Department of Hospital Medicine  [place] [facility]

## 2023-02-07 ENCOUNTER — TELEPHONE (OUTPATIENT)
Dept: PRIMARY CARE CLINIC | Facility: CLINIC | Age: 73
End: 2023-02-07
Payer: MEDICARE

## 2023-02-07 NOTE — TELEPHONE ENCOUNTER
----- Message from Marj Scott sent at 2/7/2023 11:53 AM CST -----  Contact: Deric/Spouse [phone]  Stated that the patient is currently admitted. Spouse is requesting a current medication list.    Please call and advise.    Thank You

## 2023-02-22 RX ORDER — GLUCOSAM/CHON-MSM1/C/MANG/BOSW 500-416.6
TABLET ORAL
Qty: 100 EACH | Refills: 3 | Status: SHIPPED | OUTPATIENT
Start: 2023-02-22 | End: 2023-03-06 | Stop reason: SDUPTHER

## 2023-03-03 PROBLEM — R53.81 DEBILITY: Status: ACTIVE | Noted: 2023-03-03

## 2023-03-06 RX ORDER — INSULIN ASPART 100 [IU]/ML
1-10 INJECTION, SOLUTION INTRAVENOUS; SUBCUTANEOUS
Qty: 3 ML | Refills: 0 | OUTPATIENT
Start: 2023-03-06 | End: 2023-05-12 | Stop reason: SDUPTHER

## 2023-03-06 RX ORDER — PANTOPRAZOLE SODIUM 40 MG/1
40 TABLET, DELAYED RELEASE ORAL DAILY
Qty: 90 TABLET | Refills: 3 | Status: ON HOLD | OUTPATIENT
Start: 2023-03-06 | End: 2024-02-02 | Stop reason: HOSPADM

## 2023-03-06 RX ORDER — MECLIZINE HYDROCHLORIDE 25 MG/1
25 TABLET ORAL 3 TIMES DAILY PRN
Qty: 20 TABLET | Refills: 1 | OUTPATIENT
Start: 2023-03-06 | End: 2023-03-10 | Stop reason: SDUPTHER

## 2023-03-07 ENCOUNTER — PATIENT OUTREACH (OUTPATIENT)
Dept: ADMINISTRATIVE | Facility: HOSPITAL | Age: 73
End: 2023-03-07
Payer: MEDICARE

## 2023-03-07 ENCOUNTER — TELEPHONE (OUTPATIENT)
Dept: PRIMARY CARE CLINIC | Facility: CLINIC | Age: 73
End: 2023-03-07
Payer: MEDICARE

## 2023-03-07 PROCEDURE — G0180 MD CERTIFICATION HHA PATIENT: HCPCS | Mod: ,,, | Performed by: INTERNAL MEDICINE

## 2023-03-07 PROCEDURE — G0180 PR HOME HEALTH MD CERTIFICATION: ICD-10-PCS | Mod: ,,, | Performed by: INTERNAL MEDICINE

## 2023-03-07 NOTE — TELEPHONE ENCOUNTER
----- Message from Sharmaine Chaidez sent at 3/7/2023  9:49 AM CST -----  Contact: [phone]  Pt was discharged from the hospital on yesterday. Pt is needing to be seen by 03/13. The next available appt is 04/11. Please call to schedule.      Pt is also needing a prescription for insulin as well. It was prescribed at discharged. Pt is using   [id number] - [place], [place] - [address]  [address]  [place] [address]  Phone: [phone] Fax: [phone]      Janki obando/[facility] number is [phone], if needed.         Thank you

## 2023-03-07 NOTE — PROGRESS NOTES
Health Maintenance Due   Topic Date Due    Hepatitis C Screening  Never done    TETANUS VACCINE  Never done    Shingles Vaccine (1 of 2) Never done    COVID-19 Vaccine (3 - Booster for Pfizer series) 05/01/2021    Influenza Vaccine (1) 09/01/2022    Foot Exam  02/07/2023     Chart review done. HM updated. Immunizations reviewed & updated. Care Everywhere updated.  DM Eye Exam from 12/12/22 imported into chart.

## 2023-03-10 ENCOUNTER — PATIENT MESSAGE (OUTPATIENT)
Dept: PRIMARY CARE CLINIC | Facility: CLINIC | Age: 73
End: 2023-03-10

## 2023-03-10 ENCOUNTER — OFFICE VISIT (OUTPATIENT)
Dept: PRIMARY CARE CLINIC | Facility: CLINIC | Age: 73
End: 2023-03-10
Payer: MEDICARE

## 2023-03-10 VITALS
OXYGEN SATURATION: 99 % | DIASTOLIC BLOOD PRESSURE: 50 MMHG | HEART RATE: 70 BPM | WEIGHT: 117.75 LBS | SYSTOLIC BLOOD PRESSURE: 100 MMHG | BODY MASS INDEX: 19.59 KG/M2

## 2023-03-10 DIAGNOSIS — J96.01 ACUTE HYPOXEMIC RESPIRATORY FAILURE: ICD-10-CM

## 2023-03-10 DIAGNOSIS — R53.81 DEBILITY: ICD-10-CM

## 2023-03-10 DIAGNOSIS — R42 DIZZINESS: ICD-10-CM

## 2023-03-10 DIAGNOSIS — E11.9 TYPE 2 DIABETES MELLITUS WITHOUT COMPLICATION, WITH LONG-TERM CURRENT USE OF INSULIN: ICD-10-CM

## 2023-03-10 DIAGNOSIS — R57.9 SHOCK: ICD-10-CM

## 2023-03-10 DIAGNOSIS — N17.9 AKI (ACUTE KIDNEY INJURY): ICD-10-CM

## 2023-03-10 DIAGNOSIS — K21.9 GASTROESOPHAGEAL REFLUX DISEASE WITHOUT ESOPHAGITIS: ICD-10-CM

## 2023-03-10 DIAGNOSIS — E87.1 HYPONATREMIA: ICD-10-CM

## 2023-03-10 DIAGNOSIS — E83.39 HYPOPHOSPHATEMIA: ICD-10-CM

## 2023-03-10 DIAGNOSIS — Z79.4 TYPE 2 DIABETES MELLITUS WITHOUT COMPLICATION, WITH LONG-TERM CURRENT USE OF INSULIN: ICD-10-CM

## 2023-03-10 DIAGNOSIS — Z93.3 COLOSTOMY STATUS: ICD-10-CM

## 2023-03-10 DIAGNOSIS — N17.0 ATN (ACUTE TUBULAR NECROSIS): ICD-10-CM

## 2023-03-10 DIAGNOSIS — Z99.2 HEMODIALYSIS STATUS: ICD-10-CM

## 2023-03-10 DIAGNOSIS — K65.9 PERITONITIS: Primary | ICD-10-CM

## 2023-03-10 PROCEDURE — 1159F PR MEDICATION LIST DOCUMENTED IN MEDICAL RECORD: ICD-10-PCS | Mod: CPTII,S$GLB,, | Performed by: NURSE PRACTITIONER

## 2023-03-10 PROCEDURE — 1159F MED LIST DOCD IN RCRD: CPT | Mod: CPTII,S$GLB,, | Performed by: NURSE PRACTITIONER

## 2023-03-10 PROCEDURE — 99999 PR PBB SHADOW E&M-EST. PATIENT-LVL V: CPT | Mod: PBBFAC,,, | Performed by: NURSE PRACTITIONER

## 2023-03-10 PROCEDURE — 1160F RVW MEDS BY RX/DR IN RCRD: CPT | Mod: CPTII,S$GLB,, | Performed by: NURSE PRACTITIONER

## 2023-03-10 PROCEDURE — 3008F BODY MASS INDEX DOCD: CPT | Mod: CPTII,S$GLB,, | Performed by: NURSE PRACTITIONER

## 2023-03-10 PROCEDURE — 1160F PR REVIEW ALL MEDS BY PRESCRIBER/CLIN PHARMACIST DOCUMENTED: ICD-10-PCS | Mod: CPTII,S$GLB,, | Performed by: NURSE PRACTITIONER

## 2023-03-10 PROCEDURE — 3074F SYST BP LT 130 MM HG: CPT | Mod: CPTII,S$GLB,, | Performed by: NURSE PRACTITIONER

## 2023-03-10 PROCEDURE — 3074F PR MOST RECENT SYSTOLIC BLOOD PRESSURE < 130 MM HG: ICD-10-PCS | Mod: CPTII,S$GLB,, | Performed by: NURSE PRACTITIONER

## 2023-03-10 PROCEDURE — 99214 PR OFFICE/OUTPT VISIT, EST, LEVL IV, 30-39 MIN: ICD-10-PCS | Mod: S$GLB,,, | Performed by: NURSE PRACTITIONER

## 2023-03-10 PROCEDURE — 3078F DIAST BP <80 MM HG: CPT | Mod: CPTII,S$GLB,, | Performed by: NURSE PRACTITIONER

## 2023-03-10 PROCEDURE — 3078F PR MOST RECENT DIASTOLIC BLOOD PRESSURE < 80 MM HG: ICD-10-PCS | Mod: CPTII,S$GLB,, | Performed by: NURSE PRACTITIONER

## 2023-03-10 PROCEDURE — 99999 PR PBB SHADOW E&M-EST. PATIENT-LVL V: ICD-10-PCS | Mod: PBBFAC,,, | Performed by: NURSE PRACTITIONER

## 2023-03-10 PROCEDURE — 3008F PR BODY MASS INDEX (BMI) DOCUMENTED: ICD-10-PCS | Mod: CPTII,S$GLB,, | Performed by: NURSE PRACTITIONER

## 2023-03-10 PROCEDURE — 3066F PR DOCUMENTATION OF TREATMENT FOR NEPHROPATHY: ICD-10-PCS | Mod: CPTII,S$GLB,, | Performed by: NURSE PRACTITIONER

## 2023-03-10 PROCEDURE — 99214 OFFICE O/P EST MOD 30 MIN: CPT | Mod: S$GLB,,, | Performed by: NURSE PRACTITIONER

## 2023-03-10 PROCEDURE — 3066F NEPHROPATHY DOC TX: CPT | Mod: CPTII,S$GLB,, | Performed by: NURSE PRACTITIONER

## 2023-03-10 RX ORDER — MECLIZINE HYDROCHLORIDE 25 MG/1
25 TABLET ORAL 3 TIMES DAILY PRN
Qty: 20 TABLET | Refills: 0 | Status: SHIPPED | OUTPATIENT
Start: 2023-03-10 | End: 2023-03-21 | Stop reason: SDUPTHER

## 2023-03-10 RX ORDER — PREGABALIN 75 MG/1
75 CAPSULE ORAL DAILY
Qty: 30 CAPSULE | Refills: 0 | Status: ON HOLD | OUTPATIENT
Start: 2023-03-10 | End: 2024-02-22

## 2023-03-10 NOTE — PROGRESS NOTES
Transitional Care Note  Subjective:       Patient ID: Lily Green is a 72 y.o. female.  Chief Complaint: Follow-up (Hospital )    Family and/or Caretaker present at visit?  Yes.-  and sitter  Diagnostic tests reviewed/disposition: No diagnosic tests pending after this hospitalization.  Disease/illness education: diabetes  Home health/community services discussion/referrals: Patient does not have home health established from hospital visit.  They do not need home health.  If needed, we will set up home health for the patient.   Establishment or re-establishment of referral orders for community resources: No other necessary community resources.   Discussion with other health care providers: No discussion with other health care providers necessary.       HPI    This is a 72-year-old female patient of 's with PMH of Celiac disease who is new to me and is presenting as a hospital follow-up from 1/26/23-3/6/23 for diagnosis of peritonitis. Pt now having HD tid and a colostomy  Pt and  want her to switch back from insulin to metformin. She is continuing rehab and doing well. Still need to gain some weight.     Discharge notes: 3/6/23  presented to [facility] following a fall. Patient found to be in minimal distress.  But was found to be hypotensive and tachycardic. Labs showed elevated WBC of 26.16, Glucose 269, , LA 7, and Beta-hydroxybutyrate 0.6. She was acidotic with ph of 7.282 and AG 21. Rest of the labs unremarkable. CT ab/pel revealed distention of the colon with a large amount of stool and fluid, and a segment of the colon that appeared narrowed with possible thickening of the wall, strongly suspected to be a colonic mass. S/p enema and disimpaction. The patient was given fluids (3 L) and antibiotics and transferred for further evaluation and possible surgery. Transferred to [facility] for surgery. Overnight patient developed acute severe peritonities. Ex lap  revealed severe ischemic bowel with necrosis. Patient underwent total colectomy. The following AM, pt's CBC revealed 5 point drop in hemoglobin to 6.3. Ultrasound showed fluid collection. CT abdomen confrimed brisk bleed. Patient returned to OR for ex lap. Bleed stopped in OR.      Patient showed ATN on admit and required intiation of CRRT via trialysis line. She remained anuric. Over course of 3 days and regular HD patient mentation and physical status improved. Stepped down to floor on 1/21/23. Tunnel catheter placed by general surgery. HD stopped early on day of line placement due to blockage which was suspected to be from vasospasm. HD occurred successfully on 1/26/23. Pt sent to [facility] with nephrology following for continue HD on MWF.         Hospital Course:   Pt admitted to [facility] on 1/27/23. She was found to have a malfunction to her tunneled line. She was sent back to [facility] for IR intervention. Tunneled line replaced on 1/28. Pt transferred back to [facility] 2/2/23. Patient was admitted to [facility] for continued HD and rehabilitation. mobility improved during admission. Continued to require HD 3 times weekly. Nephrology started retacrit and iron. Outpatient HD set up by  - first HD 3/7 at 1130. Appetite improved with marinol BID. Education given to pt by staff regarding ostomy and insulin injections. Patient discharged home with . Family also hired personal sitter. Follow up with PCP, nephrology, and surgery.    Abdominal incision left and midline   RLQ ostomy *-  Wounds consistently followed by [facility]    Review of Systems   Constitutional:  Positive for activity change, appetite change and fatigue.   Respiratory:  Negative for chest tightness, shortness of breath and wheezing.    Cardiovascular:  Negative for chest pain.   Gastrointestinal:  Negative for abdominal distention, abdominal pain, constipation, diarrhea and nausea.   Genitourinary:  Negative for difficulty urinating.    Musculoskeletal:  Positive for gait problem. Negative for arthralgias, back pain and myalgias.   Neurological:  Positive for dizziness and weakness. Negative for light-headedness and headaches.   Psychiatric/Behavioral:  Negative for agitation and confusion. The patient is not nervous/anxious.          Objective:      Physical Exam  Vitals and nursing note reviewed.   Constitutional:       General: She is not in acute distress.     Appearance: Normal appearance. She is not ill-appearing.   HENT:      Head: Normocephalic and atraumatic.   Cardiovascular:      Rate and Rhythm: Normal rate and regular rhythm.      Heart sounds: Normal heart sounds.   Pulmonary:      Effort: Pulmonary effort is normal. No respiratory distress.      Breath sounds: Normal breath sounds.   Abdominal:      Comments: Colostomy in place and secure. C/D/I   Musculoskeletal:         General: Normal range of motion.      Comments: Pt in WC   Skin:     General: Skin is warm and dry.      Comments: Left HD port dressing c/d/i   Neurological:      General: No focal deficit present.      Mental Status: She is alert and oriented to person, place, and time.   Psychiatric:         Mood and Affect: Mood normal.         Behavior: Behavior normal.         Thought Content: Thought content normal.         Judgment: Judgment normal.           Assessment:       1. Peritonitis    2. Shock    3. Colostomy status    4. Hemodialysis status    5. Acute hypoxemic respiratory failure    6. HINA (acute kidney injury)    7. ATN (acute tubular necrosis)    8. Hyponatremia    9. Hypophosphatemia    10. Dizziness    11. Type 2 diabetes mellitus without complication, with long-term current use of insulin    12. Gastroesophageal reflux disease without esophagitis    13. Debility        Plan:       nephrology Follow up.    Why: HD 3 times weekly    Manuel Javier MD Follow up.    Specialty: General Surgery  Contact information:  [address]  [address]  [place]  "[id number]  [phone]      [facility] FOR HOME USE    GRAB BAR FOR HOME USE    START taking these medications    amLODIPine 5 MG tablet  Commonly known as: NORVASC  Take 1 tablet (5 mg total) by mouth once daily.      calcium-vitamin D3 500 mg-5 mcg (200 unit) per tablet  Commonly known as: OS-CIPRIANO 500 + D3  Take 1 tablet by mouth once daily.  Replaces: CALTRATE WITH VITAMIN D3 ORAL      dronabinoL 5 MG capsule  Commonly known as: MARINOL  Take 1 capsule (5 mg total) by mouth 2 (two) times daily before meals.      epoetin noble-epbx 10,000 unit/mL imjection  Commonly known as: RETACRIT  Inject 1 mL (10,000 Units total) into the skin every Mon, Wed, Fri. HOLD IF HEMOGLOBIN is 10 or GREATER     DO NOT SHAKE  DO NOT DILUTE  DO NOT MIX with other drug solutions      ferrous gluconate 324 MG tablet  Commonly known as: FERGON  Take 1 tablet (324 mg total) by mouth daily with breakfast.  Start taking on: March 7, 2023      insulin aspart U-100 100 unit/mL (3 mL) Inpn pen  Commonly known as: NovoLOG  Inject 1-10 Units into the skin before meals and at bedtime as needed (Hyperglycemia). **MODERATE CORRECTION DOSE**     Blood Glucose  mg/dL                  Pre-meal                Bedtime  151-200                2 units                    1 unit  201-250                4 units                    2 units                251-300                6 units                    3 units     301-350                8 units                    4 units    >350                     10 units                  5 units  Administer subcutaneously if needed at times designated by monitoring schedule.      DO NOT HOLD correction dose insulin for patients who are  NPO.  "HIGH ALERT MEDICATION" - Administer with meals or TF/TPN.      insulin detemir U-100 100 unit/mL (3 mL) Inpn pen  Commonly known as: Levemir FLEXTOUCH  Inject 4 Units into the skin 2 (two) times daily. If Blood Glucose is less than 100 mg/dL at BEDTIME, give 16 grams (four glucose tablets) X 1 " "dose for patients who can take PO. Recheck BG in 30 minutes and call MD for insulin dose adjustments prior to administering insulin detemir (Levemir).  "HIGH ALERT MEDICATION"      melatonin 3 mg tablet  Commonly known as: MELATIN  Take 3 tablets (9 mg total) by mouth nightly as needed for Insomnia.      nutritional supplements Liqd  Take 237 mLs by mouth 4 (four) times daily.      MELLISSA-BENJY 0.8 mg Tab  Generic drug: B complex-vitamin C-folic acid  Take 1 tablet (0.8 mg total) by mouth once daily.      sevelamer carbonate 800 mg Tab  Commonly known as: RENVELA  Take 2 tablets (1,600 mg total) by mouth 3 (three) times daily with meals. DO NOT CRUSH OR CHEW; SWALLOW WHOLE.      simethicone 80 MG chewable tablet  Commonly known as: MYLICON  Take 1 tablet (80 mg total) by mouth 4 (four) times daily with meals and nightly.        Pt is needing to f/u with surgeon, nephrology, and endocrine for further eval and treat/management  F/u as needed for any concerns      "

## 2023-03-13 ENCOUNTER — TELEPHONE (OUTPATIENT)
Dept: PRIMARY CARE CLINIC | Facility: CLINIC | Age: 73
End: 2023-03-13
Payer: MEDICARE

## 2023-03-13 DIAGNOSIS — E11.29 TYPE 2 DIABETES MELLITUS WITH OTHER KIDNEY COMPLICATION, UNSPECIFIED WHETHER LONG TERM INSULIN USE: ICD-10-CM

## 2023-03-13 DIAGNOSIS — Z79.899 MEDICATION MANAGEMENT: Primary | ICD-10-CM

## 2023-03-13 DIAGNOSIS — K58.9 IRRITABLE BOWEL SYNDROME, UNSPECIFIED TYPE: ICD-10-CM

## 2023-03-13 DIAGNOSIS — K55.9 LARGE BOWEL ISCHEMIA: ICD-10-CM

## 2023-03-13 DIAGNOSIS — Z43.3 COLOSTOMY CARE: ICD-10-CM

## 2023-03-13 NOTE — TELEPHONE ENCOUNTER
We ordered a referral to endocrine for management of her diabetes meds from insulin back to oral meds    I ordered a referral to Dr. Calvert colon and rectal surgeon  Dr. Javier is gen surgery-with port      Make sure they are scheduling an appt with nephrology for hemodialysis and med management

## 2023-03-13 NOTE — TELEPHONE ENCOUNTER
----- Message from Mercedes Angelo sent at 3/13/2023 12:02 PM CDT -----  Contact: Deric(spouse) [phone]  Patient would like to get a referral.  Referral to what specialty: general surgery  Does the patient want the referral with a specific physician:  yes/Dr Miguelina Calvert  [facility] the [facility]:  [facility]  Reason (be specific):    Does the patient already have the specialty clinic appointment scheduled:  no  If yes, what date is the appointment scheduled:     Is the insurance listed in Epic correct? (this is important for a referral):  yes  Advised patient that once provider approves this either a nurse or  will return their call?:   Would the patient like a call back, or a response through their [facility] portal?:   pt portal  Comments:   Deric states the referral can be faxed to .

## 2023-03-13 NOTE — TELEPHONE ENCOUNTER
----- Message from Mercedes Angelo sent at 3/13/2023 11:59 AM CDT -----  Contact: Deric()[phone]  Pt  is requesting a call back from the office to go over the pt medications. Pt  states the pt saw Tess on 03/10/23 and she was supposed to get back with them after speaking with Dr Calixto.

## 2023-03-13 NOTE — TELEPHONE ENCOUNTER
I'm not entirely sure what kind of bag she's using.  Usually people change them 1-3 x/day depending on the output into them.  I'd recommend discussing with her ordering provider (gastroenterology) to be sure that's the correct process for the supplies ordered.

## 2023-03-15 ENCOUNTER — TELEPHONE (OUTPATIENT)
Dept: ADMINISTRATIVE | Facility: HOSPITAL | Age: 73
End: 2023-03-15
Payer: MEDICARE

## 2023-03-15 ENCOUNTER — TELEPHONE (OUTPATIENT)
Dept: PRIMARY CARE CLINIC | Facility: CLINIC | Age: 73
End: 2023-03-15
Payer: MEDICARE

## 2023-03-15 NOTE — TELEPHONE ENCOUNTER
----- Message from Nona Mack sent at 3/15/2023 10:23 AM CDT -----  Contact: [phone]  Pt's   called to get three names of endocrinologist that the doctor recommends for the pt. He also wanted to speak to the provider or staff regarding his wife's recent surgery and recovery. Please Advise

## 2023-03-16 ENCOUNTER — TELEPHONE (OUTPATIENT)
Dept: PRIMARY CARE CLINIC | Facility: CLINIC | Age: 73
End: 2023-03-16
Payer: MEDICARE

## 2023-03-16 NOTE — TELEPHONE ENCOUNTER
----- Message from Hiral Reeves sent at 3/16/2023  9:34 AM CDT -----  Contact: [phone]  Providers office is telling pt they still have not received referral. They are asking if it can possibly be emailed to [email or web address]. The fax # pt has [phone]

## 2023-03-17 ENCOUNTER — TELEPHONE (OUTPATIENT)
Dept: PRIMARY CARE CLINIC | Facility: CLINIC | Age: 73
End: 2023-03-17
Payer: MEDICARE

## 2023-03-17 NOTE — TELEPHONE ENCOUNTER
----- Message from Nicolasa Okeefe sent at 3/17/2023 10:06 AM CDT -----  Contact: Deric @  [phone]  Patient has a referral still pending since 3/13 to see Dr Miguelina Calvert.  Please check on this referral and then fax the approval to [phone] or [phone].

## 2023-03-21 DIAGNOSIS — K21.9 GASTROESOPHAGEAL REFLUX DISEASE WITHOUT ESOPHAGITIS: ICD-10-CM

## 2023-03-21 DIAGNOSIS — R42 DIZZINESS: ICD-10-CM

## 2023-03-21 RX ORDER — PREGABALIN 75 MG/1
75 CAPSULE ORAL DAILY
Qty: 30 CAPSULE | Refills: 0 | Status: CANCELLED | OUTPATIENT
Start: 2023-03-21

## 2023-03-29 ENCOUNTER — TELEPHONE (OUTPATIENT)
Dept: PRIMARY CARE CLINIC | Facility: CLINIC | Age: 73
End: 2023-03-29
Payer: MEDICARE

## 2023-03-29 NOTE — TELEPHONE ENCOUNTER
----- Message from Casa Aponte sent at 3/29/2023 10:45 AM CDT -----  Contact: home care Zana [phone]  Zana is calling pt BP is low 78/56 today and a lil light headed. Please call her back.

## 2023-03-29 NOTE — TELEPHONE ENCOUNTER
Do not take amlodipine.  Stop finasteride.  Drink plenty gatorade or powerade to bring BP up.  Schedule for nurse visit BP check to confirm BP reading accuracy

## 2023-03-29 NOTE — TELEPHONE ENCOUNTER
I see amlodipine 5 mg was started few weeks ago?  Also is pt taking finasteride?  Both of those medications can cause low BP

## 2023-03-29 NOTE — TELEPHONE ENCOUNTER
Pt took Finasteride today, been a week or two since she stopped taking Amlodipine. Pt stated she was not currently feeling lightheaded. Asked pt to take blood pressure, came back 94/52. Pt stated that she was also having a slight dry cough, no other symptoms

## 2023-03-29 NOTE — TELEPHONE ENCOUNTER
Spoke with Rica mendez bp was 78/56 sitting. She took it agin lying down an it went up to 100/60. Pt is complaining of light headed. Pt is not on any bp medication. Please advise

## 2023-04-17 PROBLEM — N17.9 AKI (ACUTE KIDNEY INJURY): Status: RESOLVED | Noted: 2023-01-15 | Resolved: 2023-04-17

## 2023-04-17 PROBLEM — J96.01 ACUTE HYPOXEMIC RESPIRATORY FAILURE: Status: RESOLVED | Noted: 2023-01-15 | Resolved: 2023-04-17

## 2023-05-04 ENCOUNTER — NURSE TRIAGE (OUTPATIENT)
Dept: ADMINISTRATIVE | Facility: CLINIC | Age: 73
End: 2023-05-04
Payer: MEDICARE

## 2023-05-04 NOTE — TELEPHONE ENCOUNTER
Pt calling regarding an abnormal Potassium level. Reports she is supposed to have dialysis again on Saturday. She was told by nephrology at [facility] today that she would be given a medication to take for her elevated potassium. I have explained to the patient that I am unable to view any recent encounters. She reports her doctor is Dr. Warren. I was able to page Dr. Warren as he is on call. He reports Ms. Green is not his patient. I have explained to Ms. Green to follow up with her nephrologist in this regard. I have advised, per nursing advice to be evaluated in the ED. She verbalizes understanding but will try to call [facility] tomorrow.      Reason for Disposition   Nursing judgment or information in reference    Protocols used: No Guideline Gycoambxo-D-UD

## 2023-05-05 DIAGNOSIS — K21.9 GASTROESOPHAGEAL REFLUX DISEASE WITHOUT ESOPHAGITIS: ICD-10-CM

## 2023-05-05 DIAGNOSIS — R42 DIZZINESS: ICD-10-CM

## 2023-05-05 RX ORDER — MECLIZINE HYDROCHLORIDE 25 MG/1
TABLET ORAL
Qty: 20 TABLET | Refills: 3 | Status: SHIPPED | OUTPATIENT
Start: 2023-05-05 | End: 2023-07-07

## 2023-05-05 RX ORDER — MIDODRINE HYDROCHLORIDE 5 MG/1
5 TABLET ORAL 2 TIMES DAILY PRN
Qty: 60 TABLET | Refills: 11 | Status: SHIPPED | OUTPATIENT
Start: 2023-05-05 | End: 2023-12-21 | Stop reason: SDUPTHER

## 2023-05-08 PROBLEM — N17.0 ATN (ACUTE TUBULAR NECROSIS): Status: RESOLVED | Noted: 2023-01-15 | Resolved: 2023-05-08

## 2023-05-12 ENCOUNTER — OFFICE VISIT (OUTPATIENT)
Dept: ENDOCRINOLOGY | Facility: CLINIC | Age: 73
End: 2023-05-12
Payer: MEDICARE

## 2023-05-12 VITALS
BODY MASS INDEX: 21.3 KG/M2 | HEART RATE: 77 BPM | SYSTOLIC BLOOD PRESSURE: 93 MMHG | WEIGHT: 128 LBS | TEMPERATURE: 99 F | DIASTOLIC BLOOD PRESSURE: 60 MMHG

## 2023-05-12 DIAGNOSIS — N18.6 ESRD (END STAGE RENAL DISEASE) ON DIALYSIS: ICD-10-CM

## 2023-05-12 DIAGNOSIS — Z99.2 ESRD (END STAGE RENAL DISEASE) ON DIALYSIS: ICD-10-CM

## 2023-05-12 DIAGNOSIS — Z79.899 MEDICATION MANAGEMENT: ICD-10-CM

## 2023-05-12 DIAGNOSIS — E11.29 TYPE 2 DIABETES MELLITUS WITH OTHER KIDNEY COMPLICATION, UNSPECIFIED WHETHER LONG TERM INSULIN USE: Primary | ICD-10-CM

## 2023-05-12 PROCEDURE — 1159F PR MEDICATION LIST DOCUMENTED IN MEDICAL RECORD: ICD-10-PCS | Mod: CPTII,S$GLB,, | Performed by: NURSE PRACTITIONER

## 2023-05-12 PROCEDURE — 3008F PR BODY MASS INDEX (BMI) DOCUMENTED: ICD-10-PCS | Mod: CPTII,S$GLB,, | Performed by: NURSE PRACTITIONER

## 2023-05-12 PROCEDURE — 3078F PR MOST RECENT DIASTOLIC BLOOD PRESSURE < 80 MM HG: ICD-10-PCS | Mod: CPTII,S$GLB,, | Performed by: NURSE PRACTITIONER

## 2023-05-12 PROCEDURE — 3074F PR MOST RECENT SYSTOLIC BLOOD PRESSURE < 130 MM HG: ICD-10-PCS | Mod: CPTII,S$GLB,, | Performed by: NURSE PRACTITIONER

## 2023-05-12 PROCEDURE — 1159F MED LIST DOCD IN RCRD: CPT | Mod: CPTII,S$GLB,, | Performed by: NURSE PRACTITIONER

## 2023-05-12 PROCEDURE — 99204 OFFICE O/P NEW MOD 45 MIN: CPT | Mod: S$GLB,,, | Performed by: NURSE PRACTITIONER

## 2023-05-12 PROCEDURE — 3078F DIAST BP <80 MM HG: CPT | Mod: CPTII,S$GLB,, | Performed by: NURSE PRACTITIONER

## 2023-05-12 PROCEDURE — 1160F RVW MEDS BY RX/DR IN RCRD: CPT | Mod: CPTII,S$GLB,, | Performed by: NURSE PRACTITIONER

## 2023-05-12 PROCEDURE — 3008F BODY MASS INDEX DOCD: CPT | Mod: CPTII,S$GLB,, | Performed by: NURSE PRACTITIONER

## 2023-05-12 PROCEDURE — 99204 PR OFFICE/OUTPT VISIT, NEW, LEVL IV, 45-59 MIN: ICD-10-PCS | Mod: S$GLB,,, | Performed by: NURSE PRACTITIONER

## 2023-05-12 PROCEDURE — 99999 PR PBB SHADOW E&M-EST. PATIENT-LVL V: CPT | Mod: PBBFAC,,, | Performed by: NURSE PRACTITIONER

## 2023-05-12 PROCEDURE — 1160F PR REVIEW ALL MEDS BY PRESCRIBER/CLIN PHARMACIST DOCUMENTED: ICD-10-PCS | Mod: CPTII,S$GLB,, | Performed by: NURSE PRACTITIONER

## 2023-05-12 PROCEDURE — 3066F NEPHROPATHY DOC TX: CPT | Mod: CPTII,S$GLB,, | Performed by: NURSE PRACTITIONER

## 2023-05-12 PROCEDURE — 3066F PR DOCUMENTATION OF TREATMENT FOR NEPHROPATHY: ICD-10-PCS | Mod: CPTII,S$GLB,, | Performed by: NURSE PRACTITIONER

## 2023-05-12 PROCEDURE — 99999 PR PBB SHADOW E&M-EST. PATIENT-LVL V: ICD-10-PCS | Mod: PBBFAC,,, | Performed by: NURSE PRACTITIONER

## 2023-05-12 PROCEDURE — 3074F SYST BP LT 130 MM HG: CPT | Mod: CPTII,S$GLB,, | Performed by: NURSE PRACTITIONER

## 2023-05-12 RX ORDER — INSULIN GLARGINE 100 [IU]/ML
INJECTION, SOLUTION SUBCUTANEOUS
Qty: 15 ML | Refills: 2 | Status: SHIPPED | OUTPATIENT
Start: 2023-05-12 | End: 2023-06-28 | Stop reason: SDUPTHER

## 2023-05-12 RX ORDER — PEN NEEDLE, DIABETIC 30 GX3/16"
1 NEEDLE, DISPOSABLE MISCELLANEOUS
Qty: 150 EACH | Refills: 11 | Status: SHIPPED | OUTPATIENT
Start: 2023-05-12 | End: 2023-10-02 | Stop reason: SDUPTHER

## 2023-05-12 RX ORDER — INSULIN ASPART 100 [IU]/ML
INJECTION, SOLUTION INTRAVENOUS; SUBCUTANEOUS
Qty: 3 ML | Refills: 0 | Status: SHIPPED | OUTPATIENT
Start: 2023-05-12 | End: 2023-08-21 | Stop reason: SDUPTHER

## 2023-05-12 NOTE — PROGRESS NOTES
CC: This 72 y.o. White female  is here for evaluation of  T2DM along with comorbidities indicated in the Visit Diagnosis section of this encounter.    HPI: Lily Green was diagnosed with T2DM in 50s. Metformin started at the time of diagnosis.   Med hx: celiac disease;     DM COMPLICATIONS: nephropathy    New to Endocrine. Arrives with  Deric and caregiver Rocky.   Pt's has long-standing h/o controlled diabetes on metformin 1250 mg/day. She had a  prolonged hospitalization from 1/26-3/6/23 for peritonitis. She underwent  total colectomy, now with a colostomy. She also now has ESRD on HD on MWF. Metformin was stopped during her hospital admission and she was started on insulin.       LAST DIABETES EDUCATION: none, has attended a celiac disease and diabetes seminar years ago.     HOSPITALIZED FOR DIABETES  -  No.    SIGNIFICANT DIABETES MED HISTORY: n/a     PRESCRIBED DIABETES MEDICATIONS:   Lantus Solostar 4 units bid (Levemir is on med list but pt takes Lantus) - 8:30 am  and 6:30 pm   Novolog ss:     Blood Glucose   mg/dL                  Pre-meal                Bedtime   151-200                2 units                    1 unit   201-250                4 units                    2 units   251-300                6 units                    3 units   301-350                8 units                    4 units   >350                     10 units                  5 units       Misses medication doses - No    Rotates insulin injections:  LUQ   Changes insulin pen needles: yes      SELF MONITORING BLOOD GLUCOSE: Checks blood glucose at home 3-4x/day. Bedtime readings are 2-3 hours after dinner.           HYPOGLYCEMIC EPISODES: denies symptoms at glucose of 78.        CURRENT DIET: drinks water and 1 decaf coffee in AM, Novosource protein shake. Eats 3 meals/day. No snacks.   Dinner 6:30 to 7     CURRENT EXERCISE:       BP 93/60   Pulse 77   Temp 98.5 °F (36.9 °C)   Wt 58.1 kg (128 lb)   LMP  (LMP  Unknown) Comment: BEAU/ NO BSO  1986  BMI 21.30 kg/m²     ROS:   CONSTITUTIONAL: Appetite good, +  fatigue  SKIN: No rash or pruritis   EYES: No visual disturbances  RESPIRATORY: No shortness of breath or cough  CARDIAC: No chest pain or palpitations  GI: No nausea; uses colostomy.   : still urinates but not much   MS: No arthralgias or mylagias   NEURO: +  paresthesias to feet   OTHER: no polydipsia; denies overnight sweats       PHYSICAL EXAM:  GENERAL: Well developed, well nourished. No acute distress.   PSYCH: AAOx3, appropriate mood and affect, conversant, very well-groomed. Judgement and insight good.   NEURO: Cranial nerves grossly intact. Speech clear, no tremor.   NECK: Trachea midline, no thyromegaly or lymphadenopathy.   CHEST: Respirations even and unlabored. CTA bilaterally.  CARDIOVASCULAR: Regular rate and rhythm. No bruits. No murmur. No edema.   ABDOMEN: Soft, non-tender, non-distended. Bowel sounds present.   MS: Gait steady. No clubbing.   SKIN: Normal skin turgor. Skin warm and dry. No areas of breakdown. No acanthosis nigricans.  FEET:       Protective Sensation (w/ 10 gram monofilament):  Right: Intact  Left: Intact    Visual Inspection:  Skin Breakdown -  Neither    Pedal Pulses:   Right: Present  Left: Present    Posterior Tibialis Pulses:   Right:Present  Left: Present        Hemoglobin A1C   Date Value Ref Range Status   11/01/2022 5.8 (H) 4.0 - 5.6 % Final     Comment:     ADA Screening Guidelines:  5.7-6.4%  Consistent with prediabetes  >or=6.5%  Consistent with diabetes    High levels of fetal hemoglobin interfere with the HbA1C  assay. Heterozygous hemoglobin variants (HbS, HgC, etc)do  not significantly interfere with this assay.   However, presence of multiple variants may affect accuracy.     04/22/2022 6.0 (H) 4.0 - 5.6 % Final     Comment:     ADA Screening Guidelines:  5.7-6.4%  Consistent with prediabetes  >or=6.5%  Consistent with diabetes    High levels of fetal hemoglobin  interfere with the HbA1C  assay. Heterozygous hemoglobin variants (HbS, HgC, etc)do  not significantly interfere with this assay.   However, presence of multiple variants may affect accuracy.     10/19/2021 6.2 (H) 4.0 - 5.6 % Final     Comment:     ADA Screening Guidelines:  5.7-6.4%  Consistent with prediabetes  >or=6.5%  Consistent with diabetes    High levels of fetal hemoglobin interfere with the HbA1C  assay. Heterozygous hemoglobin variants (HbS, HgC, etc)do  not significantly interfere with this assay.   However, presence of multiple variants may affect accuracy.     10/23/2019 6.8 % Final       No results found for: CPEPTIDE, GLUTAMICACID, ISLETCELLANT, FRUCTOSAMINE     Lab Results   Component Value Date    CHOL 149 11/01/2022    CHOL 138 04/22/2022    CHOL 194 10/19/2021     Lab Results   Component Value Date    HDL 54 11/01/2022    HDL 52 04/22/2022    HDL 58 10/19/2021     Lab Results   Component Value Date    LDLCALC 77.0 11/01/2022    LDLCALC 64.4 04/22/2022    LDLCALC 104.0 10/19/2021     Lab Results   Component Value Date    TRIG 96 01/14/2023    TRIG 90 11/01/2022    TRIG 108 04/22/2022     Lab Results   Component Value Date    CHOLHDL 36.2 11/01/2022    CHOLHDL 37.7 04/22/2022    CHOLHDL 29.9 10/19/2021         Chemistry        Component Value Date/Time     (L) 03/06/2023 0556    K 6.2 (H) 03/06/2023 0556    CL 91 (L) 03/06/2023 0556    CO2 16 (L) 03/06/2023 0556     (H) 03/06/2023 0556    CREATININE 7.8 (H) 03/06/2023 0556    GLU 98 03/06/2023 0556        Component Value Date/Time    CALCIUM 10.2 03/06/2023 0556    ALKPHOS 136 (H) 03/06/2023 0556    AST 17 03/06/2023 0556    ALT 14 03/06/2023 0556    BILITOT 0.5 03/06/2023 0556    ESTGFRAFRICA >60.0 04/22/2022 0910    EGFRNONAA >60.0 04/22/2022 0910         Latest Reference Range & Units 03/06/23 05:56   BUN 8 - 23 mg/dL 129 (H)   Creatinine 0.5 - 1.4 mg/dL 7.8 (H)   eGFR >60 mL/min/1.73 m^2 5.1 !   (H): Data is abnormally high  !:  Data is abnormal      Lab Results   Component Value Date    LABMICR <5.0 11/01/2022    CREATRANDUR 41.0 11/01/2022    MICALBCREAT Unable to calculate 11/01/2022             ASSESSMENT and PLAN:    A1C GOAL:     1. Type 2 diabetes mellitus with other kidney complication, unspecified whether long term insulin use  A1c may reflect falsely low glucose average d/t anemia. Her complete glucose logs show good glucose control but fasting glucoses are a bit low. Advised:     Reviewed how to rotate insulin injection sites.   Reduce Lantus to 4 units AM and 3 units PM.   If glucoses continue to be less than 80, then reduce to 3 units twice daily.   Continue Novolog sliding scale.   Will send orders for Dexcom G7  Continuous Glucose Monitor (CGM). Pt will start if affordable. Will need to see diabetes educator for Dexcom training.   With fingersticks, ok to reduce to testing 2x/day before/2 hours after meals. Glucoses after meals should be less than 200. Goal for glucoses before meals ~ .   Return to clinic in 3 months with labs prior.       Ambulatory referral/consult to Endocrinology    insulin (LANTUS SOLOSTAR U-100 INSULIN) glargine 100 units/mL SubQ pen    insulin aspart U-100 (NOVOLOG) 100 unit/mL (3 mL) InPn pen    Hemoglobin A1C    C-Peptide    Glutamic Acid Decarboxylase    Anti-islet cell antibody    Glucose, Fasting      2. Medication management  Ambulatory referral/consult to Endocrinology      3. ESRD (end stage renal disease) on dialysis  Avoid hypoglycemia.             Orders Placed This Encounter   Procedures    Hemoglobin A1C     Standing Status:   Future     Standing Expiration Date:   7/10/2024    C-Peptide     Standing Status:   Future     Standing Expiration Date:   7/10/2024    Glutamic Acid Decarboxylase     Standing Status:   Future     Standing Expiration Date:   7/10/2024    Anti-islet cell antibody     Standing Status:   Future     Standing Expiration Date:   7/10/2024    Glucose, Fasting      Standing Status:   Future     Standing Expiration Date:   7/10/2024        Follow up in about 3 months (around 8/12/2023).     Thank you very much for allowing me to participate in Lily Green's care.

## 2023-05-12 NOTE — PATIENT INSTRUCTIONS
Reviewed how to rotate insulin injection sites.   Reduce Lantus to 4 units AM and 3 units PM.   If glucoses continue to be less than 80, then reduce to 3 units twice daily.   Continue Novolog sliding scale.   Will send orders for Dexcom G7  Continuous Glucose Monitor (CGM). Pt will start if affordable. Will need to see diabetes educator for Dexcom training.   With fingersticks, ok to reduce to testing 2x/day before/2 hours after meals. Glucoses after meals should be less than 200. Goal for glucoses before meals ~ .   Return to clinic in 3 months with labs prior.

## 2023-05-15 ENCOUNTER — TELEPHONE (OUTPATIENT)
Dept: ENDOCRINOLOGY | Facility: CLINIC | Age: 73
End: 2023-05-15
Payer: MEDICARE

## 2023-05-15 NOTE — TELEPHONE ENCOUNTER
----- Message from Gerri Parish sent at 5/15/2023  1:02 PM CDT -----  Type: RX Refill Request    Who Called: pt requesting this med. The auth was cancelled and pt doesn't know why. Call pt     Have you contacted your pharmacy:    Refill or New Rx:insulin (LANTUS SOLOSTAR U-100 INSULIN) glargine 100 units/mL SubQ pen - needs pa    RX Name and Strength:    How is the patient currently taking it? (ex. 1XDay):    Is this a 30 day or 90 day RX:    Preferred Pharmacy with phone number:  [id number] - [place], [place] - [address]  [address]  [place] [address]  Phone: [phone] Fax: [phone]        Local or Mail Order:    Ordering Provider:    Would the patient rather a call back or a response via [facility]? call    Best Call Back Number:[phone] (home)       Additional Information:

## 2023-05-15 NOTE — TELEPHONE ENCOUNTER
Spoke to pharmacy, was informed that pt received 3 pens of lantus on 3/7/23 and her insurance will not approve it again until 5/27.    Pt notified and verbalized understanding

## 2023-05-29 DIAGNOSIS — E11.42 TYPE 2 DIABETES MELLITUS WITH DIABETIC POLYNEUROPATHY, WITHOUT LONG-TERM CURRENT USE OF INSULIN: ICD-10-CM

## 2023-05-29 RX ORDER — LANCETS
1 EACH MISCELLANEOUS 4 TIMES DAILY
Qty: 400 EACH | Refills: 3 | Status: SHIPPED | OUTPATIENT
Start: 2023-05-29

## 2023-05-29 NOTE — TELEPHONE ENCOUNTER
----- Message from Yovana Guillermo sent at 5/29/2023  9:22 AM CDT -----  Contact: Pt [phone]  Is this a refill or new RX: NEW change the directions    RX name and strengthlancets (TRUEPLUS LANCETS) 30 gauge Misc  Is this a 30 day or 90 day RX: 90  Directions: You told her to start checking her sugar 4 times a day instead of once a day  Pharmacy name and phone # [id number] - [place] - [address]   Phone:  [phone] Fax:  [phone]

## 2023-05-29 NOTE — TELEPHONE ENCOUNTER
I advised her to start Dexcom if affordable. If she decides on fingersticks, it's ok to reduce to testing 2x/day before/2 hours after meals.

## 2023-05-31 ENCOUNTER — TELEPHONE (OUTPATIENT)
Dept: NEPHROLOGY | Facility: CLINIC | Age: 73
End: 2023-05-31
Payer: MEDICARE

## 2023-05-31 ENCOUNTER — DOCUMENTATION ONLY (OUTPATIENT)
Dept: NEPHROLOGY | Facility: CLINIC | Age: 73
End: 2023-05-31
Payer: MEDICARE

## 2023-05-31 ENCOUNTER — OFFICE VISIT (OUTPATIENT)
Dept: PODIATRY | Facility: CLINIC | Age: 73
End: 2023-05-31
Payer: MEDICARE

## 2023-05-31 VITALS — BODY MASS INDEX: 21.34 KG/M2 | WEIGHT: 128.06 LBS | HEIGHT: 65 IN

## 2023-05-31 DIAGNOSIS — E11.42 DIABETIC POLYNEUROPATHY ASSOCIATED WITH TYPE 2 DIABETES MELLITUS: Primary | ICD-10-CM

## 2023-05-31 DIAGNOSIS — M20.42 HAMMER TOES OF BOTH FEET: ICD-10-CM

## 2023-05-31 DIAGNOSIS — M20.41 HAMMER TOES OF BOTH FEET: ICD-10-CM

## 2023-05-31 DIAGNOSIS — R20.2 PARESTHESIA OF BOTH FEET: ICD-10-CM

## 2023-05-31 DIAGNOSIS — B35.1 ONYCHOMYCOSIS DUE TO DERMATOPHYTE: ICD-10-CM

## 2023-05-31 DIAGNOSIS — L84 CORN OR CALLUS: ICD-10-CM

## 2023-05-31 PROCEDURE — 99999 PR PBB SHADOW E&M-EST. PATIENT-LVL II: CPT | Mod: PBBFAC,,, | Performed by: PODIATRIST

## 2023-05-31 PROCEDURE — 3288F PR FALLS RISK ASSESSMENT DOCUMENTED: ICD-10-PCS | Mod: CPTII,S$GLB,, | Performed by: PODIATRIST

## 2023-05-31 PROCEDURE — 3008F BODY MASS INDEX DOCD: CPT | Mod: CPTII,S$GLB,, | Performed by: PODIATRIST

## 2023-05-31 PROCEDURE — 11721 DEBRIDE NAIL 6 OR MORE: CPT | Mod: Q9,59,S$GLB, | Performed by: PODIATRIST

## 2023-05-31 PROCEDURE — 3288F FALL RISK ASSESSMENT DOCD: CPT | Mod: CPTII,S$GLB,, | Performed by: PODIATRIST

## 2023-05-31 PROCEDURE — 3066F NEPHROPATHY DOC TX: CPT | Mod: CPTII,S$GLB,, | Performed by: PODIATRIST

## 2023-05-31 PROCEDURE — 3008F PR BODY MASS INDEX (BMI) DOCUMENTED: ICD-10-PCS | Mod: CPTII,S$GLB,, | Performed by: PODIATRIST

## 2023-05-31 PROCEDURE — 1126F AMNT PAIN NOTED NONE PRSNT: CPT | Mod: CPTII,S$GLB,, | Performed by: PODIATRIST

## 2023-05-31 PROCEDURE — 1101F PT FALLS ASSESS-DOCD LE1/YR: CPT | Mod: CPTII,S$GLB,, | Performed by: PODIATRIST

## 2023-05-31 PROCEDURE — 11721 PR DEBRIDEMENT OF NAILS, 6 OR MORE: ICD-10-PCS | Mod: Q9,59,S$GLB, | Performed by: PODIATRIST

## 2023-05-31 PROCEDURE — 99499 UNLISTED E&M SERVICE: CPT | Mod: S$GLB,,, | Performed by: PODIATRIST

## 2023-05-31 PROCEDURE — 99499 NO LOS: ICD-10-PCS | Mod: S$GLB,,, | Performed by: PODIATRIST

## 2023-05-31 PROCEDURE — 3066F PR DOCUMENTATION OF TREATMENT FOR NEPHROPATHY: ICD-10-PCS | Mod: CPTII,S$GLB,, | Performed by: PODIATRIST

## 2023-05-31 PROCEDURE — 1101F PR PT FALLS ASSESS DOC 0-1 FALLS W/OUT INJ PAST YR: ICD-10-PCS | Mod: CPTII,S$GLB,, | Performed by: PODIATRIST

## 2023-05-31 PROCEDURE — 99999 PR PBB SHADOW E&M-EST. PATIENT-LVL II: ICD-10-PCS | Mod: PBBFAC,,, | Performed by: PODIATRIST

## 2023-05-31 PROCEDURE — 11056 PARNG/CUTG B9 HYPRKR LES 2-4: CPT | Mod: Q9,S$GLB,, | Performed by: PODIATRIST

## 2023-05-31 PROCEDURE — 1126F PR PAIN SEVERITY QUANTIFIED, NO PAIN PRESENT: ICD-10-PCS | Mod: CPTII,S$GLB,, | Performed by: PODIATRIST

## 2023-05-31 PROCEDURE — 1159F PR MEDICATION LIST DOCUMENTED IN MEDICAL RECORD: ICD-10-PCS | Mod: CPTII,S$GLB,, | Performed by: PODIATRIST

## 2023-05-31 PROCEDURE — 1159F MED LIST DOCD IN RCRD: CPT | Mod: CPTII,S$GLB,, | Performed by: PODIATRIST

## 2023-05-31 PROCEDURE — 11056 PR TRIM BENIGN HYPERKERATOTIC SKIN LESION,2-4: ICD-10-PCS | Mod: Q9,S$GLB,, | Performed by: PODIATRIST

## 2023-05-31 NOTE — PROGRESS NOTES
Conversation with Bri[facility] my meds for [place] 2023  We're here to answer your questions.    ME: Have code but not accepting.    Cristina WHITE: Kendall! Thank you for chatting with [employer]. My name is Cristina WHITE and I will be with you shortly. We appreciate your patience as we work to help each user.    Cristina WHITE: Hi there    Cristina WHITE: What is the PA key?    ME: [id number]    ME: He wants it give twice a day. She needs it, please.    ME: If not, she will end up in the hospital.    ME: ?    Cristina WHITE: Thank you for that! We definitely want to ensure this patient gets their meds    Cristina WHITE: I do see that message stating the plan cannot find this patient.  We see this message when the plan has different demographic information for the  patient, or if a more specific form is available    Cristina WHITE: I would like to make sure this is the best form for this  patient! We need the BIN, PCN and RxGroup numbers to confirm the form. They can  be located on the back of the patient's prescription insurance card or may have  been given to you by the pharmacy. Do you have that information?    ME: let me look    ME: GRP [id number], [id number]    ME: This is all I have in EPIC    Cristina WHITE: Okay! That seems to refer to the medical    Cristina WHITE: How were you notified a PA was needed?    ME: calling the pt now/ 2 days    Cristina WHITE: I would ask for the BIN PCN RXGroup and Member ID if you speak with the patient    ME: BIN [id number]; PCN [id number]    ME: GRP [id number], [id number]    Cristina WHITE: Great, I do show we are on the best form    Cristina WHITE: Can you please check that this  patient's name, , address and Member ID are all entered correctly?    ME: shanitaage in [facility] from Latissa Branch, LPN who was sent it from Neena Branch, LPN. This is a dialysis pt in [facility] for Dr. Matt Cartwright. Hid=s cell number if you need peer tp perr is [phone]. The patient said the insurance refused it because it is usually given once  a day. Dr. Cartwright said she needs it twice a day.    Cristina WHITE: Vinicio! My recommendation here would be to call the plan to verify if a PA is needed. If so, I would proceed verbally with the agent    Cristina WHITE: [employer] can be reached at [phone]    Cristina WHITE: If you have any other questions, I will be here to help! Thank you for using  [employer] and have a great day! :)    ME: thank you    Sarah JOHN: Kendall! Thank you for chatting with [employer]. My name is Sarah JOHN and I will be with you shortly. We appreciate your patience as we work to help each user.    Sarah JOHN: You're welcome. We will be available if you do need anything else. Thank you for using [employer] have a great day.    Enter a message

## 2023-06-01 NOTE — PROGRESS NOTES
Subjective:      Patient ID: Lily Green is a 72 y.o. female.    Chief Complaint: Diabetes Mellitus and Diabetic Foot Exam      Lily Kline is a 72 y.o. female who presents to the clinic for evaluation and treatment of diabetic feet. Lily Kline has a past medical history of Anxiety, Celiac disease (05/2018), Celiac disease, Depression, Diabetes mellitus, Family history of breast cancer in mother, Hyperlipidemia, Hypertension, Meniere disease, Meniere's disease, unspecified ear, Menopause, Peptic ulcer, Reflux esophagitis, Urinary tract infection, Vaginal infection, and Vaginal Pap smear (04/07/2014). Relates having toenails toenails and calluses that are in need of trimming.  Denies pain from said issues with today's exam.  Has not attempted to self treat.  Also, notes the usual amount of neuropathy while at rest.  Due to more recent medical issues, she has been unable to follow up with Pain Management.  Denies any additional pedal complaints.    PCP: Pavel Calixto MD    Date Last Seen by PCP: 3/23  Hemoglobin A1C   Date Value Ref Range Status   11/01/2022 5.8 (H) 4.0 - 5.6 % Final     Comment:     ADA Screening Guidelines:  5.7-6.4%  Consistent with prediabetes  >or=6.5%  Consistent with diabetes    High levels of fetal hemoglobin interfere with the HbA1C  assay. Heterozygous hemoglobin variants (HbS, HgC, etc)do  not significantly interfere with this assay.   However, presence of multiple variants may affect accuracy.     04/22/2022 6.0 (H) 4.0 - 5.6 % Final     Comment:     ADA Screening Guidelines:  5.7-6.4%  Consistent with prediabetes  >or=6.5%  Consistent with diabetes    High levels of fetal hemoglobin interfere with the HbA1C  assay. Heterozygous hemoglobin variants (HbS, HgC, etc)do  not significantly interfere with this assay.   However, presence of multiple variants may affect accuracy.     10/19/2021 6.2 (H) 4.0 - 5.6 % Final     Comment:     ADA Screening Guidelines:  5.7-6.4%  Consistent  with prediabetes  >or=6.5%  Consistent with diabetes    High levels of fetal hemoglobin interfere with the HbA1C  assay. Heterozygous hemoglobin variants (HbS, HgC, etc)do  not significantly interfere with this assay.   However, presence of multiple variants may affect accuracy.     10/23/2019 6.8 % Final         Past Medical History:   Diagnosis Date    Anxiety     Celiac disease 2018    Celiac disease     Depression     Diabetes mellitus     Family history of breast cancer in mother      at age 68    Hyperlipidemia     Hypertension     Meniere disease     Meniere's disease, unspecified ear     Menopause     Peptic ulcer     Reflux esophagitis     Urinary tract infection     Vaginal infection     Vaginal Pap smear 2014    Pap/Hpv Negative        Past Surgical History:   Procedure Laterality Date    APPENDECTOMY      BREAST SURGERY      Tags    CARPAL TUNNEL RELEASE Bilateral 2017    ,     CLOSURE, COLOSTOMY Left 2023    Procedure: CLOSURE, COLOSTOMY;  Surgeon: Manuel Javier MD;  Location: [facility] OR;  Service: General;  Laterality: Left;    COLONOSCOPY  2018    Normal  ([place] [facility])     COLOSTOMY Right 2023    Procedure: CREATION, COLOSTOMY;  Surgeon: Manuel Javier MD;  Location: [facility] OR;  Service: General;  Laterality: Right;    DILATION AND CURETTAGE OF UTERUS  1986    DUPUYTREN CONTRACTURE RELEASE Bilateral 2017    HYSTERECTOMY      BEAU w/ appy, no BSO     INJECTION OF NEUROLYTIC AGENT AROUND LUMBAR SYMPATHETIC NERVE N/A 2022    Procedure: BLOCK, LUMBAR SYMPATHETIC;  Surgeon: Souleymane Rizo Jr., MD;  Location: [facility] PAIN MGT;  Service: Pain Management;  Laterality: N/A;  no pacemaker   pt is diabetic     INJECTION OF NEUROLYTIC AGENT AROUND LUMBAR SYMPATHETIC NERVE N/A 2022    Procedure: BLOCK, LUMBAR SYMPATHETIC;  Surgeon: Souleymane Rizo Jr., MD;  Location: [facility]IN [facility]GT;  Service: Pain Management;  Laterality: N/A;  diabetic     INSERTION OF  TUNNELED CENTRAL VENOUS HEMODIALYSIS CATHETER Right 1/25/2023    Procedure: INSERTION, CATHETER, HEMODIALYSIS, DUAL LUMEN;  Surgeon: Manuel Javier MD;  Location: [facility] OR;  Service: General;  Laterality: Right;    INSERTION, CATHETER, TUNNELED Left 1/28/2023    Procedure: Insertion,catheter,tunneled;  Surgeon: Watson Fontenot MD;  Location: [facility] OR;  Service: General;  Laterality: Left;    LAPAROTOMY, EXPLORATORY N/A 1/14/2023    Procedure: LAPAROTOMY, EXPLORATORY;  Surgeon: Manuel Javier MD;  Location: [facility] OR;  Service: General;  Laterality: N/A;    LAPAROTOMY, EXPLORATORY N/A 1/16/2023    Procedure: LAPAROTOMY, EXPLORATORY;  Surgeon: Manuel Javier MD;  Location: [facility] OR;  Service: General;  Laterality: N/A;    REMOVAL OF CATHETER Right 1/28/2023    Procedure: REMOVAL, CATHETER;  Surgeon: Watson Fontenot MD;  Location: [facility] OR;  Service: General;  Laterality: Right;    REMOVAL, TUNNELED CATH Right 1/25/2023    Procedure: REMOVAL, TUNNELED CATH;  Surgeon: Manuel Javier MD;  Location: [facility] OR;  Service: General;  Laterality: Right;    SHOULDER SURGERY  2009 & 2010    right rotator cuff    TONSILLECTOMY      UPPER GASTROINTESTINAL ENDOSCOPY  04/2018       Family History   Problem Relation Age of Onset    Vision loss Father     Arthritis Father     Breast cancer Mother     Cancer Mother     Vision loss Mother     Hyperlipidemia Sister     Breast cancer Paternal Aunt     Cancer Paternal Aunt     Diabetes Paternal Grandfather     Vision loss Sister     Kidney disease Neg Hx        Social History     Socioeconomic History    Marital status:    Tobacco Use    Smoking status: Never    Smokeless tobacco: Never   Substance and Sexual Activity    Alcohol use: No    Drug use: Never    Sexual activity: Not Currently     Partners: Male     Birth control/protection: See Surgical Hx     Comment: :      Social Determinants of Health     Financial Resource Strain: Low Risk     Difficulty of Paying  Living Expenses: Not hard at all   Food Insecurity: No Food Insecurity    Worried About Running Out of Food in the Last Year: Never true    Ran Out of Food in the Last Year: Never true   Transportation Needs: No Transportation Needs    Lack of Transportation (Medical): No    Lack of Transportation (Non-Medical): No   Physical Activity: Inactive    Days of Exercise per Week: 7 days    Minutes of Exercise per Session: 0 min   Stress: Stress Concern Present    Feeling of Stress : Very much   Social Connections: Unknown    Frequency of Communication with Friends and Family: More than three times a week    Frequency of Social Gatherings with Friends and Family: More than three times a week    Active Member of Clubs or Organizations: Yes    Attends Club or Organization Meetings: More than 4 times per year    Marital Status:    Housing Stability: Low Risk     Unable to Pay for Housing in the Last Year: No    Number of Places Lived in the Last Year: 1    Unstable Housing in the Last Year: No       Current Outpatient Medications   Medication Sig Dispense Refill    atorvastatin (LIPITOR) 40 MG tablet TAKE 1 TABLET BY MOUTH ONCE DAILY 90 tablet 3    B complex-vitamin C-folic acid (MELLISSA-BENJY) 0.8 mg Tab Take 1 tablet (0.8 mg total) by mouth once daily. 30 tablet 3    betahistine HCl (BETAHISTINE, BULK, MISC)       blood sugar diagnostic (TRUE METRIX GLUCOSE TEST STRIP) Strp USE ONE STRIP FOR TESTING 2 TIMES A  each 11    blood-glucose meter kit Use to test twice a day 1 each 0    calcium-vitamin D3 (OS-CIPRIANO 500 + D3) 500 mg-5 mcg (200 unit) per tablet Take 1 tablet by mouth once daily. 30 tablet 3    coenzyme Q10 100 mg capsule       cyanocobalamin 1,000 mcg/mL injection Inject 1 mL (1,000 mcg total) into the muscle every 30 days. 10 mL 3    dicyclomine (BENTYL) 10 MG capsule       dronabinoL (MARINOL) 5 MG capsule Take 1 capsule (5 mg total) by mouth 2 (two) times daily before meals. 60 capsule 1    econazole  "nitrate 1 % cream Apply topically 2 (two) times daily. 30 g 2    epoetin noble-epbx (RETACRIT) 10,000 unit/mL imjection Inject 1 mL (10,000 Units total) into the skin every Mon, Wed, Fri. HOLD IF HEMOGLOBIN is 10 or GREATER    DO NOT SHAKE  DO NOT DILUTE  DO NOT MIX with other drug solutions 12 mL 0    ferrous gluconate (FERGON) 324 MG tablet Take 1 tablet (324 mg total) by mouth daily with breakfast. 30 tablet 3    insulin (LANTUS SOLOSTAR U-100 INSULIN) glargine 100 units/mL SubQ pen Inject 4 units every morning and 3 units every night 15 mL 2    insulin aspart U-100 (NOVOLOG) 100 unit/mL (3 mL) InPn pen Inject before meals: 150-200=+2, 201-250=+4; 251-300=+6; 301-350=+8, over 350=+10 units; inject as needed at bedtime: 150-200=+1, 201-250=+2, 251-300=+3; 301-350=+4, over 350=+5 units 3 mL 0    lancets Misc 1 lancet by Misc.(Non-Drug; Combo Route) route 4 (four) times daily. 400 each 3    meclizine (ANTIVERT) 25 mg tablet TAKE ONE TABLET BY MOUTH THREE TIMES DAILY AS NEEDED FOR DIZZINESS 20 tablet 3    melatonin (MELATIN) 3 mg tablet Take 3 tablets (9 mg total) by mouth nightly as needed for Insomnia. 90 tablet 3    midodrine (PROAMATINE) 5 MG Tab Take 1 tablet (5 mg total) by mouth 2 (two) times daily as needed (if BP is systolic below 90 mmHg and patient is symptomatic. Also if BP is below 90 mmHg during dialysis). 60 tablet 11    nutritional supplements Liqd Take 237 mLs by mouth 4 (four) times daily. 120 each 6    pantoprazole (PROTONIX) 40 MG tablet Take 1 tablet (40 mg total) by mouth once daily. 90 tablet 3    patiromer calcium sorbitex (VELTASSA) 8.4 gram PwPk Take 1 packet (8.4 g total) by mouth 2 (two) times a day. 60 packet 2    pen needle, diabetic (BD ULTRA-FINE MARIS PEN NEEDLE) 32 gauge x 5/32" Ndle 1 Device by Misc.(Non-Drug; Combo Route) route 4 (four) times daily with meals and nightly. 150 each 11    pregabalin (LYRICA) 75 MG capsule Take 1 capsule (75 mg total) by mouth Daily. Give after HD 30 " capsule 0    sevelamer carbonate (RENVELA) 800 mg Tab Take 2 tablets (1,600 mg total) by mouth 3 (three) times daily with meals. DO NOT CRUSH OR CHEW; SWALLOW WHOLE. 180 tablet 3    simethicone (MYLICON) 80 MG chewable tablet Take 1 tablet (80 mg total) by mouth 4 (four) times daily with meals and nightly. 120 tablet 3    vit C,N-Vs-mlczl-lutein-zeaxan (PRESERVISION AREDS 2) 352-853-86-1 mg-unit-mg-mg Cap       vitamins  A,C,E-zinc-copper 14,320-226-200 unit-mg-unit Cap Take 1 tablet by mouth once daily.       No current facility-administered medications for this visit.       Review of patient's allergies indicates:   Allergen Reactions    Crestor [rosuvastatin] Swelling    Gluten protein          Review of Systems   Constitutional: Negative for chills and fever.   Cardiovascular:  Negative for claudication and leg swelling.   Skin:  Positive for color change and nail changes. Negative for suspicious lesions.   Musculoskeletal:  Positive for joint pain. Negative for joint swelling, muscle cramps and muscle weakness.   Gastrointestinal:  Negative for nausea and vomiting.   Neurological:  Positive for numbness and paresthesias.   Psychiatric/Behavioral:  Negative for altered mental status.          Objective:      Physical Exam  Constitutional:       General: She is not in acute distress.     Appearance: She is well-developed. She is not diaphoretic.   Cardiovascular:      Pulses:           Dorsalis pedis pulses are 2+ on the right side and 2+ on the left side.        Posterior tibial pulses are 1+ on the right side and 1+ on the left side.      Comments: CFT is < 3 seconds bilateral.  Pedal hair growth is decreased bilateral.  Mild non pitting lower extremity edema noted bilateral.  Toes are cool to touch bilateral.  Musculoskeletal:         General: Deformity present. No tenderness.      Comments: Muscle strength 5/5 in all muscle groups bilateral.   (-) for pain with palpation of bilateral foot and ankle.  Semi-reducible contracture of toes 2-5 bilateral with adductovarus rotation of the 5th.   Skin:     General: Skin is warm and dry.      Capillary Refill: Capillary refill takes less than 2 seconds.      Coloration: Skin is not pale.      Findings: Lesion present. No abrasion, bruising, burn, ecchymosis, erythema, petechiae or rash.      Comments: Pedal skin appears thin bilateral.  Toenails x 10 appear thickened by 2 mm, elongated by 4 mm, and discolored with subungual debris.  Hyperkeratotic lesion noted to bilateral sub 5th met head.   Neurological:      Mental Status: She is alert and oriented to person, place, and time.      Sensory: Sensory deficit present.      Motor: No weakness or atrophy.      Comments: Protective sensation per [place] monofilament is decreased bilateral.  Vibratory sensation is decreased bilateral.  Light touch is intact bilateral.             Assessment:       Encounter Diagnoses   Name Primary?    Diabetic polyneuropathy associated with type 2 diabetes mellitus Yes    Hammer toes of both feet     Paresthesia of both feet     Corn or callus     Onychomycosis due to dermatophyte            Plan:       Lily Kline was seen today for diabetes mellitus and diabetic foot exam.    Diagnoses and all orders for this visit:    Diabetic polyneuropathy associated with type 2 diabetes mellitus    Hammer toes of both feet    Paresthesia of both feet    Corn or callus    Onychomycosis due to dermatophyte        I counseled the patient on her conditions, their implications and medical management.    To continue with daily application of CBD oil to the LEs.      To continue wearing shoe gear that minimizes pressure to the digital deformities.    Shoe inspection. Diabetic Foot Education. Patient reminded of the importance of good nutrition and blood sugar control to help prevent podiatric complications of diabetes. Patient instructed on proper foot hygeine. We discussed wearing proper shoe gear,  daily foot inspections, never walking without protective shoe gear, never putting sharp instruments to feet    With patient's permission, nails were aggressively reduced and debrided x 10 to their soft tissue attachment mechanically and with electric , removing all offending nail and debris.  Also, a sterile # 15 scalpel was used to trim lesions x 2 down to smooth appearing skin without incident.  Patient relates relief following the procedure. She will continue to monitor the areas daily, inspect her feet, wear protective shoe gear when ambulatory, moisturizer to maintain skin integrity and follow in this office in approximately 3 months, sooner p.r.n.    Follow up in about 3 months (around 8/31/2023).    Lucas Bryan DPM

## 2023-06-19 ENCOUNTER — TELEPHONE (OUTPATIENT)
Dept: ENDOCRINOLOGY | Facility: CLINIC | Age: 73
End: 2023-06-19
Payer: MEDICARE

## 2023-06-19 NOTE — TELEPHONE ENCOUNTER
----- Message from Jeffery Simmons sent at 6/19/2023  9:10 AM CDT -----  Regarding: Patient Call Back  .Type: Patient Call Back    Who called: Self     What is the request in detail: Pt called in regards to her dexcom device. States it has arrived in the mail and she doesn't know if she needs to make an appt with the provider to get set upo with it or what she needs to know. She states she doesn't have that much info about it and would like a call back regarding some questions she has. Please advise.    Can the clinic reply by [facility]? Call back    Would the patient rather a call back or a response via [facility]? Call back    Best call back number: [phone]     Additional Information:

## 2023-06-26 ENCOUNTER — TELEPHONE (OUTPATIENT)
Dept: PRIMARY CARE CLINIC | Facility: CLINIC | Age: 73
End: 2023-06-26
Payer: MEDICARE

## 2023-06-26 NOTE — TELEPHONE ENCOUNTER
----- Message from Sharmaine Chaidez sent at 6/26/2023 10:50 AM CDT -----  Contact: [phone]  Pt would like a call back to schedule a pre op appt for reverse ostomy bag surgery on 07/31. There are no available appts listed. Per pt, she does not want to see another provider. Please call.              Thank you

## 2023-06-27 ENCOUNTER — EXTERNAL HOME HEALTH (OUTPATIENT)
Dept: HOME HEALTH SERVICES | Facility: HOSPITAL | Age: 73
End: 2023-06-27
Payer: MEDICARE

## 2023-06-27 ENCOUNTER — TELEPHONE (OUTPATIENT)
Dept: ENDOCRINOLOGY | Facility: CLINIC | Age: 73
End: 2023-06-27
Payer: MEDICARE

## 2023-06-27 DIAGNOSIS — Z87.898 HISTORY OF UNSTEADY GAIT: ICD-10-CM

## 2023-06-27 DIAGNOSIS — N19 HYPERPHOSPHATEMIA ASSOCIATED WITH RENAL FAILURE: ICD-10-CM

## 2023-06-27 DIAGNOSIS — H81.09 MENIERE'S DISEASE, UNSPECIFIED LATERALITY: Primary | ICD-10-CM

## 2023-06-27 DIAGNOSIS — E83.39 HYPERPHOSPHATEMIA ASSOCIATED WITH RENAL FAILURE: ICD-10-CM

## 2023-06-27 RX ORDER — SEVELAMER CARBONATE 800 MG/1
1600 TABLET, FILM COATED ORAL
Qty: 180 TABLET | Refills: 11 | Status: SHIPPED | OUTPATIENT
Start: 2023-06-27 | End: 2023-11-26 | Stop reason: ALTCHOICE

## 2023-06-27 NOTE — TELEPHONE ENCOUNTER
Spoke to patient, states that she is coming to the office tomorrow for an appointment with the educator and will bring the log then

## 2023-06-27 NOTE — TELEPHONE ENCOUNTER
----- Message from Sherine Rocha NP sent at 6/27/2023  1:12 PM CDT -----  For diabetes clearance for surgery, please ask pt to fax or drop off latest glucose log.

## 2023-06-28 ENCOUNTER — CLINICAL SUPPORT (OUTPATIENT)
Dept: DIABETES | Facility: CLINIC | Age: 73
End: 2023-06-28
Payer: MEDICARE

## 2023-06-28 DIAGNOSIS — E11.29 TYPE 2 DIABETES MELLITUS WITH OTHER KIDNEY COMPLICATION, UNSPECIFIED WHETHER LONG TERM INSULIN USE: ICD-10-CM

## 2023-06-28 DIAGNOSIS — E11.29 TYPE 2 DIABETES MELLITUS WITH OTHER KIDNEY COMPLICATION, UNSPECIFIED WHETHER LONG TERM INSULIN USE: Primary | ICD-10-CM

## 2023-06-28 PROCEDURE — G0108 PR DIAB MANAGE TRN  PER INDIV: ICD-10-PCS | Mod: S$GLB,,, | Performed by: FAMILY MEDICINE

## 2023-06-28 PROCEDURE — G0108 DIAB MANAGE TRN  PER INDIV: HCPCS | Mod: S$GLB,,, | Performed by: FAMILY MEDICINE

## 2023-06-28 PROCEDURE — 99999 PR PBB SHADOW E&M-EST. PATIENT-LVL II: ICD-10-PCS | Mod: PBBFAC,,,

## 2023-06-28 PROCEDURE — 99999 PR PBB SHADOW E&M-EST. PATIENT-LVL II: CPT | Mod: PBBFAC,,,

## 2023-06-28 RX ORDER — INSULIN GLARGINE 100 [IU]/ML
INJECTION, SOLUTION SUBCUTANEOUS
Qty: 15 ML | Refills: 2 | Status: SHIPPED | OUTPATIENT
Start: 2023-06-28 | End: 2023-08-21

## 2023-06-29 VITALS — WEIGHT: 131.63 LBS | HEIGHT: 65 IN | BODY MASS INDEX: 21.93 KG/M2

## 2023-06-29 NOTE — PROGRESS NOTES
Diabetes Care Specialist Progress Note  Author: Tiny Blakely RN  Date: 6/28/2023    Program Intake  Reason for Diabetes Program Visit:: Intervention  Type of Intervention:: Individual  Individual: Device Training  Device Training: Personal CGM  Current diabetes risk level:: low  In the last 12 months, have you:: none  Permission to speak with others about care:: yes    Lab Results   Component Value Date    HGBA1C 5.8 (H) 11/01/2022       Clinical  Clinical Assessment  Current Diabetes Treatment: Insulin (Novolog SSI and Lantus 5U daily)  Have you ever experienced hyperglycemia (high blood sugar)?: no    Medication Information  Do you sometimes have difficulty refilling your medications?: No  Medication adherence impacting ability to self-manage diabetes?: No    Labs  Do you have regular lab work to monitor your medications?: Yes  Type of Regular Lab Work: A1c  Where do you get your labs drawn?: [facility]  Lab Compliance Barriers: No    Nutritional Status  Recent Changes in Weight: No Recent Weight Change  Current nutritional status an area of need that is impacting patient's ability to self-manage diabetes?: No    Additional Social History  Support  Does anyone support you with your diabetes care?: yes  Who supports you?: other (see comments) ( help)  Who takes you to your medical appointments?: self  Does the current support meet the patient's needs?: Yes  Is Support an area impacting ability to self-manage diabetes?: Yes    Access to Mass Media & Technology  Does the patient have access to any of the following devices or technologies?: Smart phone  Media or technology needs impacting ability to self-manage diabetes?: No    Cognitive/Behavioral Health  Alert and Oriented: Yes  Difficulty Thinking: No  Requires Prompting: No  Requires assistance for routine expression?: No  Cognitive or behavioral barriers impacting ability to self-manage diabetes?: No    Culture/Orthodoxy  Culture or Catholic beliefs that  may impact ability to access healthcare: No    Communication  Language preference: English  Hearing Problems: No  Vision Problems: Yes  Vision problem type:: Decreased Vision  Vision Assistance: Glasses  Communication needs impacting ability to self-manage diabetes?: No    Health Literacy  Preferred Learning Method: Face to Face, Reading Materials  How often do you need to have someone help you read instructions, pamphlets, or written material from your doctor or pharmacy?: Never  Health literacy needs impacting ability to self-manage diabetes?: No      Diabetes Self-Management Skills Assessment  Diabetes Disease Process/Treatment Options  Diabetes Disease Process/Treatment Options: Skills Assessment Completed: No  Deferred due to:: Time    Nutrition/Healthy Eating  Nutrition/Healthy Eating Skills Assessment Completed:: No  Deffered due to:: Time    Physical Activity/Exercise  Physical Activity/Exercise Skills Assessment Completed: : No  Deffered due to:: Time    Medications  Patient is able to describe current diabetes management routine.: yes  Diabetes management routine:: insulin  Patient is able to identify current diabetes medications, dosages, and appropriate timing of medications.: yes  Patient understands the purpose of the medications taken for diabetes.: yes  Patient reports problems or concerns with current medication regimen.: no  Medication Skills Assessment Completed:: Yes  Assessment indicates:: Instruction Needed  Area of need?: No    Home Blood Glucose Monitoring  Patient states that blood sugar is checked at home daily.: yes  Monitoring Method:: personal continuous glucose monitor  Personal CGM type:: Dexcom G7 start today  Patient is able to use personal CGM appropriately.: no  CGM Report reviewed?: no  Home Blood Glucose Monitoring Skills Assessment Completed: : Yes  Assessment indicates:: Instruction Needed, Knowledge deficit  Area of need?: Yes    Acute Complications  Acute Complications Skills  Assessment Completed: : No  Deffered due to:: Time    Chronic Complications  Chronic Complications Skills Assessment Completed: : No  Deferred due to:: Time    Psychosocial/Coping  Psychosocial/Coping Skills Assessment Completed: : No  Deffered due to:: Time    Assessment Summary and Plan    Based on today's diabetes care assessment, the following areas of need were identified:      Social 6/28/2023   Support Yes   Access to Mass Media/Tech No   Cognitive/Behavioral Health No   Culture/Jewish No   Communication No   Health Literacy No        Clinical 6/28/2023   Medication Adherence No   Lab Compliance No   Nutritional Status No        Diabetes Self-Management Skills 6/28/2023   Medication No   Home Blood Glucose Monitoring Yes- see care planning          Today's interventions were provided through individual discussion, instruction, and written materials were provided.      Patient verbalized understanding of instruction and written materials.  Pt was able to return back demonstration of instructions today. Patient understood key points, needs reinforcement and further instruction.     Diabetes Self-Management Care Plan:    Today's Diabetes Self-Management Care Plan was developed with Lily Kline's input. Lily Kline has agreed to work toward the following goal(s) to improve his/her overall diabetes control.      Care Plan: Diabetes Management   Updates made since 5/30/2023 12:00 AM        Problem: Blood Glucose Self-Monitoring         Goal: Patient agrees to change Dexcom G7 sensor every 10 days and monitor blood sugars @ least 3 times per day.    Start Date: 6/29/2023   Expected End Date: 7/12/2023   Priority: High   Barriers: No Barriers Identified   Note:    - - DEXCOM G7 CGM TRAINING  Patient referred to clinic today for Dexcom G7 continuous glucose sensor system training. Initially, training started using the dexcom G7  per patient's choice. However, there were technical difficulty with the ;  tech support call for troubleshooting; many attempts, but no resolution. Pt to recieve new  in the mail.  Pt is now using her phone via the dexcomApp. Pt and friend reviewed video on starting dexcom G7 using the reciever. Also reviewed instructions per Dexcom G7 manual.   Overview:  5min glucose reading updates, trending arrows, BG graph screens, battery life indicator, Blue Tooth Symbol.  Menus: trend Graph, start sensor, enter BG, events, Alerts, Settings, Shutdown, Stop Sensor   Settings:                          * Urgent Low: 55 mg/dL                          * Urgent Low Soon: on                          * Low alert: 70 mg/dl repeat 15 min                          * High alert: 250 mg/dl                           * Rise rate: off                          * Fall Rate: off                          * Signal Loss: on repeat 20 min                          * No Reading: on repeat 20 min                          * Always sound: on  Reviewed additional setting options with patient, including Graph Height and Transmitter ID. Dexcom G7 was paired.    Reviewed where to find sensor insertion time and sensor expiration date.   Discussed no need to calibrate sensor during the entirety of the 10 day wear. Discussed that pt can calibrate sensor if desired, but at that time she will need to continue calibrating every 12 hours for sensor to remain accurate. Reviewed appropriate calibration techniques.  Reviewed Dexcom G7 site selection. Site selected and prepped using aseptic technique Inserted to left upper arm. Transmitter/sensor placed per instructions.  Patient able to demonstrate without difficulty.  Encouraged to review manual prior to starting another sensor.    range 20 feet, but the first 3 hrs keep within 3 feet of transmitter.  Pt instructed on lag time of interstitial fluid from CBG and was advised to tx hypoglycemia and dose insulin based on SMBG values.  Dexcom technical support contact  number given and examples of when to contact them discussed. Patient advised to always check glucose with glucometer should readings do not match symptoms felt (ex. Hypo or hyperglycemia).             Follow Up Plan     Follow up in about 19 days (around 7/17/2023) for F/U dexcom G7; set up clarity.    Today's care plan and follow up schedule was discussed with patient.  Lily Sue verbalized understanding of the care plan, goals, and agrees to follow up plan.        The patient was encouraged to communicate with his/her health care provider/physician and care team regarding his/her condition(s) and treatment.  I provided the patient with my contact information today and encouraged to contact me via phone or [facility] Patient Portal as needed.     Length of Visit   Total Time: 60 Minutes

## 2023-07-03 DIAGNOSIS — G89.4 CHRONIC PAIN SYNDROME: Primary | ICD-10-CM

## 2023-07-03 DIAGNOSIS — M79.2 NEURALGIA AND NEURITIS: ICD-10-CM

## 2023-07-03 RX ORDER — PREGABALIN 150 MG/1
CAPSULE ORAL
Qty: 60 CAPSULE | Refills: 2 | Status: ON HOLD | OUTPATIENT
Start: 2023-07-03 | End: 2024-02-02 | Stop reason: HOSPADM

## 2023-07-07 DIAGNOSIS — K21.9 GASTROESOPHAGEAL REFLUX DISEASE WITHOUT ESOPHAGITIS: ICD-10-CM

## 2023-07-07 DIAGNOSIS — R42 DIZZINESS: ICD-10-CM

## 2023-07-07 RX ORDER — MECLIZINE HYDROCHLORIDE 25 MG/1
TABLET ORAL
Qty: 20 TABLET | Refills: 3 | Status: SHIPPED | OUTPATIENT
Start: 2023-07-07 | End: 2023-07-24 | Stop reason: SDUPTHER

## 2023-07-10 ENCOUNTER — OFFICE VISIT (OUTPATIENT)
Dept: PRIMARY CARE CLINIC | Facility: CLINIC | Age: 73
End: 2023-07-10
Payer: MEDICARE

## 2023-07-10 ENCOUNTER — TELEPHONE (OUTPATIENT)
Dept: PULMONOLOGY | Facility: CLINIC | Age: 73
End: 2023-07-10
Payer: MEDICARE

## 2023-07-10 VITALS
BODY MASS INDEX: 22.19 KG/M2 | SYSTOLIC BLOOD PRESSURE: 92 MMHG | OXYGEN SATURATION: 100 % | HEIGHT: 65 IN | DIASTOLIC BLOOD PRESSURE: 50 MMHG | WEIGHT: 133.19 LBS | HEART RATE: 69 BPM

## 2023-07-10 DIAGNOSIS — Z01.818 PRE-OP EXAMINATION: Primary | ICD-10-CM

## 2023-07-10 DIAGNOSIS — K55.9 LARGE BOWEL ISCHEMIA: ICD-10-CM

## 2023-07-10 DIAGNOSIS — I10 PRIMARY HYPERTENSION: ICD-10-CM

## 2023-07-10 DIAGNOSIS — E87.6 HYPOKALEMIA: ICD-10-CM

## 2023-07-10 DIAGNOSIS — R91.1 LUNG NODULE: ICD-10-CM

## 2023-07-10 PROCEDURE — 99214 OFFICE O/P EST MOD 30 MIN: CPT | Mod: S$GLB,,, | Performed by: STUDENT IN AN ORGANIZED HEALTH CARE EDUCATION/TRAINING PROGRAM

## 2023-07-10 PROCEDURE — 1159F PR MEDICATION LIST DOCUMENTED IN MEDICAL RECORD: ICD-10-PCS | Mod: CPTII,S$GLB,, | Performed by: STUDENT IN AN ORGANIZED HEALTH CARE EDUCATION/TRAINING PROGRAM

## 2023-07-10 PROCEDURE — 3008F BODY MASS INDEX DOCD: CPT | Mod: CPTII,S$GLB,, | Performed by: STUDENT IN AN ORGANIZED HEALTH CARE EDUCATION/TRAINING PROGRAM

## 2023-07-10 PROCEDURE — 3066F NEPHROPATHY DOC TX: CPT | Mod: CPTII,S$GLB,, | Performed by: STUDENT IN AN ORGANIZED HEALTH CARE EDUCATION/TRAINING PROGRAM

## 2023-07-10 PROCEDURE — 3066F PR DOCUMENTATION OF TREATMENT FOR NEPHROPATHY: ICD-10-PCS | Mod: CPTII,S$GLB,, | Performed by: STUDENT IN AN ORGANIZED HEALTH CARE EDUCATION/TRAINING PROGRAM

## 2023-07-10 PROCEDURE — 3288F FALL RISK ASSESSMENT DOCD: CPT | Mod: CPTII,S$GLB,, | Performed by: STUDENT IN AN ORGANIZED HEALTH CARE EDUCATION/TRAINING PROGRAM

## 2023-07-10 PROCEDURE — 3008F PR BODY MASS INDEX (BMI) DOCUMENTED: ICD-10-PCS | Mod: CPTII,S$GLB,, | Performed by: STUDENT IN AN ORGANIZED HEALTH CARE EDUCATION/TRAINING PROGRAM

## 2023-07-10 PROCEDURE — 3074F PR MOST RECENT SYSTOLIC BLOOD PRESSURE < 130 MM HG: ICD-10-PCS | Mod: CPTII,S$GLB,, | Performed by: STUDENT IN AN ORGANIZED HEALTH CARE EDUCATION/TRAINING PROGRAM

## 2023-07-10 PROCEDURE — 3044F PR MOST RECENT HEMOGLOBIN A1C LEVEL <7.0%: ICD-10-PCS | Mod: CPTII,S$GLB,, | Performed by: STUDENT IN AN ORGANIZED HEALTH CARE EDUCATION/TRAINING PROGRAM

## 2023-07-10 PROCEDURE — 1101F PT FALLS ASSESS-DOCD LE1/YR: CPT | Mod: CPTII,S$GLB,, | Performed by: STUDENT IN AN ORGANIZED HEALTH CARE EDUCATION/TRAINING PROGRAM

## 2023-07-10 PROCEDURE — 1126F PR PAIN SEVERITY QUANTIFIED, NO PAIN PRESENT: ICD-10-PCS | Mod: CPTII,S$GLB,, | Performed by: STUDENT IN AN ORGANIZED HEALTH CARE EDUCATION/TRAINING PROGRAM

## 2023-07-10 PROCEDURE — 1160F PR REVIEW ALL MEDS BY PRESCRIBER/CLIN PHARMACIST DOCUMENTED: ICD-10-PCS | Mod: CPTII,S$GLB,, | Performed by: STUDENT IN AN ORGANIZED HEALTH CARE EDUCATION/TRAINING PROGRAM

## 2023-07-10 PROCEDURE — 3288F PR FALLS RISK ASSESSMENT DOCUMENTED: ICD-10-PCS | Mod: CPTII,S$GLB,, | Performed by: STUDENT IN AN ORGANIZED HEALTH CARE EDUCATION/TRAINING PROGRAM

## 2023-07-10 PROCEDURE — 1101F PR PT FALLS ASSESS DOC 0-1 FALLS W/OUT INJ PAST YR: ICD-10-PCS | Mod: CPTII,S$GLB,, | Performed by: STUDENT IN AN ORGANIZED HEALTH CARE EDUCATION/TRAINING PROGRAM

## 2023-07-10 PROCEDURE — 1126F AMNT PAIN NOTED NONE PRSNT: CPT | Mod: CPTII,S$GLB,, | Performed by: STUDENT IN AN ORGANIZED HEALTH CARE EDUCATION/TRAINING PROGRAM

## 2023-07-10 PROCEDURE — 3078F PR MOST RECENT DIASTOLIC BLOOD PRESSURE < 80 MM HG: ICD-10-PCS | Mod: CPTII,S$GLB,, | Performed by: STUDENT IN AN ORGANIZED HEALTH CARE EDUCATION/TRAINING PROGRAM

## 2023-07-10 PROCEDURE — 3074F SYST BP LT 130 MM HG: CPT | Mod: CPTII,S$GLB,, | Performed by: STUDENT IN AN ORGANIZED HEALTH CARE EDUCATION/TRAINING PROGRAM

## 2023-07-10 PROCEDURE — 3078F DIAST BP <80 MM HG: CPT | Mod: CPTII,S$GLB,, | Performed by: STUDENT IN AN ORGANIZED HEALTH CARE EDUCATION/TRAINING PROGRAM

## 2023-07-10 PROCEDURE — 99214 PR OFFICE/OUTPT VISIT, EST, LEVL IV, 30-39 MIN: ICD-10-PCS | Mod: S$GLB,,, | Performed by: STUDENT IN AN ORGANIZED HEALTH CARE EDUCATION/TRAINING PROGRAM

## 2023-07-10 PROCEDURE — 1160F RVW MEDS BY RX/DR IN RCRD: CPT | Mod: CPTII,S$GLB,, | Performed by: STUDENT IN AN ORGANIZED HEALTH CARE EDUCATION/TRAINING PROGRAM

## 2023-07-10 PROCEDURE — 3044F HG A1C LEVEL LT 7.0%: CPT | Mod: CPTII,S$GLB,, | Performed by: STUDENT IN AN ORGANIZED HEALTH CARE EDUCATION/TRAINING PROGRAM

## 2023-07-10 PROCEDURE — 1159F MED LIST DOCD IN RCRD: CPT | Mod: CPTII,S$GLB,, | Performed by: STUDENT IN AN ORGANIZED HEALTH CARE EDUCATION/TRAINING PROGRAM

## 2023-07-10 PROCEDURE — 99999 PR PBB SHADOW E&M-EST. PATIENT-LVL V: CPT | Mod: PBBFAC,,, | Performed by: STUDENT IN AN ORGANIZED HEALTH CARE EDUCATION/TRAINING PROGRAM

## 2023-07-10 PROCEDURE — 99999 PR PBB SHADOW E&M-EST. PATIENT-LVL V: ICD-10-PCS | Mod: PBBFAC,,, | Performed by: STUDENT IN AN ORGANIZED HEALTH CARE EDUCATION/TRAINING PROGRAM

## 2023-07-10 RX ORDER — TORSEMIDE 20 MG/1
TABLET ORAL
COMMUNITY
Start: 2023-06-27 | End: 2023-12-21

## 2023-07-10 RX ORDER — MOMETASONE FUROATE 1 MG/G
OINTMENT TOPICAL
Status: ON HOLD | COMMUNITY
Start: 2023-06-28 | End: 2024-02-06 | Stop reason: HOSPADM

## 2023-07-10 RX ORDER — ERYTHROMYCIN 500 MG/1
TABLET, COATED ORAL
COMMUNITY
Start: 2023-06-28 | End: 2023-08-02 | Stop reason: ALTCHOICE

## 2023-07-10 RX ORDER — NEOMYCIN SULFATE 500 MG/1
TABLET ORAL
Status: ON HOLD | COMMUNITY
Start: 2023-06-27 | End: 2024-02-06 | Stop reason: HOSPADM

## 2023-07-10 NOTE — TELEPHONE ENCOUNTER
----- Message from Jenise Alcantar sent at 7/10/2023  2:15 PM CDT -----  Good Afternoon,    Dr. Elizabeth Ortiz referred (Stat) pt to pulm. Earliest date is August. Pt need an appt asap. Please reach out to pt to schedule for this week. Thanks

## 2023-07-11 ENCOUNTER — DOCUMENT SCAN (OUTPATIENT)
Dept: HOME HEALTH SERVICES | Facility: HOSPITAL | Age: 73
End: 2023-07-11
Payer: MEDICARE

## 2023-07-11 ENCOUNTER — TELEPHONE (OUTPATIENT)
Dept: ENDOCRINOLOGY | Facility: CLINIC | Age: 73
End: 2023-07-11
Payer: MEDICARE

## 2023-07-11 NOTE — TELEPHONE ENCOUNTER
----- Message from Theo To sent at 7/11/2023  1:33 PM CDT -----  Regarding: [facility] Rectal [phone]  Type: Patient Call Back    Who called: [facility] Rectal    What is the request in detail: called to check on a clearance that was faxed to the doctor. Would like a call back.     Can the clinic reply by [facility]? No     Would the patient rather a call back or a response via [facility]? Call back     Best call back number: [phone]     Additional Information:    Thank you.

## 2023-07-12 NOTE — PROGRESS NOTES
SUBJECTIVE     Chief Complaint   Patient presents with    Pre-op Exam       HPI  Lily Green is a 73 y.o. female with psoriasis, hypertension, hyperlipidemia, type 2 diabetes, renal failure, an ostomy (2/2 large bowel ischemia) that presents for preoperative exam.    PCP:     Preop examination:  Procedure- Reversal of colostomy. Hx of LBO requiring colectomy and colostomy placement.   Date- 2023  Performing physician- Dr. Murphy  Not on blood thinners  No problems with anesthesia in the past  Vital signs within normal limits in clinic today.  Nicotine use:n/a      Family, social, surgical Hx reviewed     Hx of lung nodule-noted on CT abdomen pelvis 2023. Requires follow up.    HTN- On torsemide 20mg daily    CKD5-currently on dialysis [facility] at [facility]. 2/2 complications from recent hospitalization for LBO. Hypokalemia noted on lab work.    Health Maintenance         Date Due Completion Date    Hepatitis C Screening Never done ---    TETANUS VACCINE Never done ---    Shingles Vaccine (1 of 2) Never done ---    COVID-19 Vaccine (3 - Pfizer series) 2021 3/6/2021    Influenza Vaccine (1) 2023 10/3/2020    Override on 10/8/2019: Done ([place])    Eye Exam 2023    Mammogram 2023    Hemoglobin A1c 2024    Lipid Panel 2024    Foot Exam 2024    Override on 10/6/2020: Done    DEXA Scan 2026 1/3/2023    Override on 2014: Done              PAST MEDICAL HISTORY:  Past Medical History:   Diagnosis Date    Anxiety     Celiac disease 2018    Celiac disease     Depression     Diabetes mellitus     Family history of breast cancer in mother      at age 68    Hyperlipidemia     Hypertension     Meniere disease     Meniere's disease, unspecified ear     Menopause     Peptic ulcer     Reflux esophagitis     Urinary tract infection     Vaginal infection     Vaginal Pap  smear 04/07/2014    Pap/Hpv Negative        PAST SURGICAL HISTORY:  Past Surgical History:   Procedure Laterality Date    APPENDECTOMY      BREAST SURGERY      Tags    CARPAL TUNNEL RELEASE Bilateral 09/2017    ,     CLOSURE, COLOSTOMY Left 1/16/2023    Procedure: CLOSURE, COLOSTOMY;  Surgeon: Manuel Javier MD;  Location: [facility] OR;  Service: General;  Laterality: Left;    COLONOSCOPY  04/2018    Normal  ([place])     COLOSTOMY Right 1/16/2023    Procedure: CREATION, COLOSTOMY;  Surgeon: Manuel Javier MD;  Location: [facility] OR;  Service: General;  Laterality: Right;    DILATION AND CURETTAGE OF UTERUS  1986    DUPUYTREN CONTRACTURE RELEASE Bilateral 09/2017    HYSTERECTOMY  1987    BEAU w/ appy, no BSO     INJECTION OF NEUROLYTIC AGENT AROUND LUMBAR SYMPATHETIC NERVE N/A 1/6/2022    Procedure: BLOCK, LUMBAR SYMPATHETIC;  Surgeon: Souleymane Rizo Jr., MD;  Location: [facility] PAIN MGT;  Service: Pain Management;  Laterality: N/A;  no pacemaker   pt is diabetic     INJECTION OF NEUROLYTIC AGENT AROUND LUMBAR SYMPATHETIC NERVE N/A 8/25/2022    Procedure: BLOCK, LUMBAR SYMPATHETIC;  Surgeon: Souleymane Rizo Jr., MD;  Location: [facility] PAIN MGT;  Service: Pain Management;  Laterality: N/A;  diabetic     INSERTION OF TUNNELED CENTRAL VENOUS HEMODIALYSIS CATHETER Right 1/25/2023    Procedure: INSERTION, CATHETER, HEMODIALYSIS, DUAL LUMEN;  Surgeon: Manuel Javier MD;  Location: [facility] OR;  Service: General;  Laterality: Right;    INSERTION, CATHETER, TUNNELED Left 1/28/2023    Procedure: Insertion,catheter,tunneled;  Surgeon: Watson Fontenot MD;  Location: [facility] OR;  Service: General;  Laterality: Left;    LAPAROTOMY, EXPLORATORY N/A 1/14/2023    Procedure: LAPAROTOMY, EXPLORATORY;  Surgeon: Manuel Javier MD;  Location: [facility] OR;  Service: General;  Laterality: N/A;    LAPAROTOMY, EXPLORATORY N/A 1/16/2023    Procedure: LAPAROTOMY, EXPLORATORY;  Surgeon: Manuel Javier MD;  Location: [facility] OR;  Service:  General;  Laterality: N/A;    REMOVAL OF CATHETER Right 1/28/2023    Procedure: REMOVAL, CATHETER;  Surgeon: Watson Fontenot MD;  Location: [facility] OR;  Service: General;  Laterality: Right;    REMOVAL, TUNNELED CATH Right 1/25/2023    Procedure: REMOVAL, TUNNELED CATH;  Surgeon: Manuel Javier MD;  Location: [facility] OR;  Service: General;  Laterality: Right;    SHOULDER SURGERY  2009 & 2010    right rotator cuff    TONSILLECTOMY      UPPER GASTROINTESTINAL ENDOSCOPY  04/2018       SOCIAL HISTORY:  Social History     Socioeconomic History    Marital status:    Tobacco Use    Smoking status: Never    Smokeless tobacco: Never   Substance and Sexual Activity    Alcohol use: No    Drug use: Never    Sexual activity: Not Currently     Partners: Male     Birth control/protection: See Surgical Hx     Comment: :      Social Determinants of Health     Financial Resource Strain: Low Risk  (1/27/2023)    Overall Financial Resource Strain (CARDIA)     Difficulty of Paying Living Expenses: Not hard at all   Food Insecurity: No Food Insecurity (1/23/2023)    Hunger Vital Sign     Worried About Running Out of Food in the Last Year: Never true     Ran Out of Food in the Last Year: Never true   Transportation Needs: No Transportation Needs (1/27/2023)    PRAPARE - Transportation     Lack of Transportation (Medical): No     Lack of Transportation (Non-Medical): No   Physical Activity: Inactive (1/27/2023)    Exercise Vital Sign     Days of Exercise per Week: 7 days     Minutes of Exercise per Session: 0 min   Stress: Stress Concern Present (11/1/2022)    [employer] - Occupational Stress Questionnaire     Feeling of Stress : Very much   Social Connections: Unknown (11/1/2022)    Social Connection and Isolation Panel [NHANES]     Frequency of Communication with Friends and Family: More than three times a week     Frequency of Social Gatherings with Friends and Family: More than three times a  week     Active Member of Clubs or Organizations: Yes     Attends Club or Organization Meetings: More than 4 times per year     Marital Status:    Housing Stability: Low Risk  (1/27/2023)    Housing Stability Vital Sign     Unable to Pay for Housing in the Last Year: No     Number of Places Lived in the Last Year: 1     Unstable Housing in the Last Year: No       FAMILY HISTORY:  Family History   Problem Relation Age of Onset    Vision loss Father     Arthritis Father     Breast cancer Mother     Cancer Mother     Vision loss Mother     Hyperlipidemia Sister     Breast cancer Paternal Aunt     Cancer Paternal Aunt     Diabetes Paternal Grandfather     Vision loss Sister     Kidney disease Neg Hx        ALLERGIES AND MEDICATIONS: updated and reviewed.  Review of patient's allergies indicates:   Allergen Reactions    Crestor [rosuvastatin] Swelling    Gluten protein      Current Outpatient Medications   Medication Sig Dispense Refill    atorvastatin (LIPITOR) 40 MG tablet TAKE 1 TABLET BY MOUTH ONCE DAILY 90 tablet 3    blood sugar diagnostic (TRUE METRIX GLUCOSE TEST STRIP) Strp USE ONE STRIP FOR TESTING 2 TIMES A  each 11    blood-glucose meter kit Use to test twice a day 1 each 0    calcium-vitamin D3 (OS-CIPRIANO 500 + D3) 500 mg-5 mcg (200 unit) per tablet Take 1 tablet by mouth once daily. 30 tablet 3    epoetin noble-epbx (RETACRIT) 10,000 unit/mL imjection Inject 1 mL (10,000 Units total) into the skin every Mon, Wed, Fri. HOLD IF HEMOGLOBIN is 10 or GREATER    DO NOT SHAKE  DO NOT DILUTE  DO NOT MIX with other drug solutions 12 mL 0    ferrous gluconate (FERGON) 324 MG tablet Take 1 tablet (324 mg total) by mouth daily with breakfast. 30 tablet 3    insulin (LANTUS SOLOSTAR U-100 INSULIN) glargine 100 units/mL SubQ pen Inject 4 units every morning and 3 units every night 15 mL 2    insulin aspart U-100 (NOVOLOG) 100 unit/mL (3 mL) InPn pen Inject before meals: 150-200=+2, 201-250=+4; 251-300=+6;  "301-350=+8, over 350=+10 units; inject as needed at bedtime: 150-200=+1, 201-250=+2, 251-300=+3; 301-350=+4, over 350=+5 units 3 mL 0    lancets Misc 1 lancet by Misc.(Non-Drug; Combo Route) route 4 (four) times daily. 400 each 3    midodrine (PROAMATINE) 5 MG Tab Take 1 tablet (5 mg total) by mouth 2 (two) times daily as needed (if BP is systolic below 90 mmHg and patient is symptomatic. Also if BP is below 90 mmHg during dialysis). 60 tablet 11    pantoprazole (PROTONIX) 40 MG tablet Take 1 tablet (40 mg total) by mouth once daily. 90 tablet 3    patiromer calcium sorbitex (VELTASSA) 8.4 gram PwPk Take 1 packet (8.4 g total) by mouth 2 (two) times a day. 60 packet 2    pen needle, diabetic (BD ULTRA-FINE MARIS PEN NEEDLE) 32 gauge x 5/32" Ndle 1 Device by Misc.(Non-Drug; Combo Route) route 4 (four) times daily with meals and nightly. 150 each 11    pregabalin (LYRICA) 150 MG capsule TAKE ONE CAPSULE BY MOUTH 2 TIMES A DAY 60 capsule 2    sevelamer carbonate (RENVELA) 800 mg Tab Take 2 tablets (1,600 mg total) by mouth 3 (three) times daily with meals. DO NOT CRUSH OR CHEW; SWALLOW WHOLE. 180 tablet 11    torsemide (DEMADEX) 20 MG Tab       vit C,E-Ak-acqfu-lutein-zeaxan (PRESERVISION AREDS 2) [phone] mg-unit-mg-mg Cap       B complex-vitamin C-folic acid (MELLISSA-BENJY) 0.8 mg Tab Take 1 tablet (0.8 mg total) by mouth once daily. 30 tablet 3    betahistine HCl (BETAHISTINE, BULK, MISC)       coenzyme Q10 100 mg capsule       cyanocobalamin 1,000 mcg/mL injection Inject 1 mL (1,000 mcg total) into the muscle every 30 days. 10 mL 3    dicyclomine (BENTYL) 10 MG capsule       dronabinoL (MARINOL) 5 MG capsule Take 1 capsule (5 mg total) by mouth 2 (two) times daily before meals. 60 capsule 1    econazole nitrate 1 % cream Apply topically 2 (two) times daily. 30 g 2    meclizine (ANTIVERT) 25 mg tablet Take 1 tablet (25 mg total) by mouth 3 (three) times daily as needed for Dizziness. 90 tablet 3    melatonin " "(MELATIN) 3 mg tablet Take 3 tablets (9 mg total) by mouth nightly as needed for Insomnia. 90 tablet 3    mometasone (ELOCON) 0.1 % ointment Apply topically.      neomycin (MYCIFRADIN) 500 mg Tab       nutritional supplements Liqd Take 237 mLs by mouth 4 (four) times daily. 120 each 6    oxyCODONE (ROXICODONE) 5 MG immediate release tablet Take 5 mg by mouth every 4 (four) hours as needed.      patiromer calcium sorbitex (VELTASSA) 16.8 gram PwPk Take by mouth.      pregabalin (LYRICA) 75 MG capsule Take 1 capsule (75 mg total) by mouth Daily. Give after HD 30 capsule 0    simethicone (MYLICON) 125 mg Cap capsule Take by mouth.      simethicone (MYLICON) 80 MG chewable tablet Take 1 tablet (80 mg total) by mouth 4 (four) times daily with meals and nightly. 120 tablet 3    vitamins  A,C,E-zinc-copper 14,[phone] unit-mg-unit Cap Take 1 tablet by mouth once daily.       No current facility-administered medications for this visit.       ROS  Review of Systems   Constitutional:  Negative for fever and weight loss.   Respiratory:  Negative for cough and shortness of breath.    Cardiovascular:  Negative for chest pain and palpitations.   Gastrointestinal:  Negative for abdominal pain, constipation, diarrhea, nausea and vomiting.   Genitourinary:  Negative for dysuria.   Musculoskeletal:  Negative for back pain and joint pain.   Skin:  Negative for rash.   Neurological:  Negative for dizziness, weakness and headaches.   Psychiatric/Behavioral:  Negative for depression. The patient is not nervous/anxious.          OBJECTIVE     Physical Exam  Vitals:    07/10/23 1326   BP: (!) 92/50   Pulse: 69    Body mass index is 22.16 kg/m².  Weight: 60.4 kg (133 lb 2.5 oz)   Height: 5' 5" (165.1 cm)     Physical Exam  HENT:      Head: Normocephalic and atraumatic.      Nose: Nose normal.      Mouth/Throat:      Mouth: Mucous membranes are moist.      Pharynx: Oropharynx is clear.   Eyes:      Extraocular Movements: Extraocular " movements intact.      Conjunctiva/sclera: Conjunctivae normal.      Pupils: Pupils are equal, round, and reactive to light.   Cardiovascular:      Rate and Rhythm: Normal rate and regular rhythm.   Pulmonary:      Effort: Pulmonary effort is normal.      Breath sounds: Normal breath sounds.   Musculoskeletal:         General: No swelling. Normal range of motion.      Cervical back: Normal range of motion.      Right lower leg: No edema.      Left lower leg: No edema.   Skin:     General: Skin is warm.      Findings: No lesion or rash.   Neurological:      General: No focal deficit present.      Mental Status: She is alert and oriented to person, place, and time.      Motor: No weakness.   Psychiatric:         Mood and Affect: Mood normal.         Thought Content: Thought content normal.               ASSESSMENT     73 y.o. female with     1. Pre-op examination    2. Large bowel ischemia    3. Primary hypertension    4. Lung nodule    5. Hypokalemia        PLAN:     1. Pre-op examination  Clearance pending additional clearance by Nephrology  die to hypokalemia.    2. Large bowel ischemia  Plan per 1     3. Primary hypertension  Stable on medications, continue regimen    4. Lung nodule  -     Ambulatory referral/consult to Pulmonology; Future; Expected date: 07/17/2023    5. Hypokalemia  Plan per 1, follow up with Nephrology          Elizabeth Ortiz MD

## 2023-07-19 ENCOUNTER — OFFICE VISIT (OUTPATIENT)
Dept: PULMONOLOGY | Facility: CLINIC | Age: 73
End: 2023-07-19
Payer: MEDICARE

## 2023-07-19 VITALS
WEIGHT: 134.69 LBS | HEART RATE: 69 BPM | DIASTOLIC BLOOD PRESSURE: 62 MMHG | HEIGHT: 65 IN | SYSTOLIC BLOOD PRESSURE: 98 MMHG | BODY MASS INDEX: 22.44 KG/M2 | OXYGEN SATURATION: 96 %

## 2023-07-19 DIAGNOSIS — R91.1 LUNG NODULE: ICD-10-CM

## 2023-07-19 DIAGNOSIS — R91.1 SOLITARY PULMONARY NODULE: Primary | ICD-10-CM

## 2023-07-19 PROCEDURE — 99203 PR OFFICE/OUTPT VISIT, NEW, LEVL III, 30-44 MIN: ICD-10-PCS | Mod: S$GLB,,, | Performed by: INTERNAL MEDICINE

## 2023-07-19 PROCEDURE — 3044F PR MOST RECENT HEMOGLOBIN A1C LEVEL <7.0%: ICD-10-PCS | Mod: CPTII,S$GLB,, | Performed by: INTERNAL MEDICINE

## 2023-07-19 PROCEDURE — 3074F PR MOST RECENT SYSTOLIC BLOOD PRESSURE < 130 MM HG: ICD-10-PCS | Mod: CPTII,S$GLB,, | Performed by: INTERNAL MEDICINE

## 2023-07-19 PROCEDURE — 3066F NEPHROPATHY DOC TX: CPT | Mod: CPTII,S$GLB,, | Performed by: INTERNAL MEDICINE

## 2023-07-19 PROCEDURE — 3008F PR BODY MASS INDEX (BMI) DOCUMENTED: ICD-10-PCS | Mod: CPTII,S$GLB,, | Performed by: INTERNAL MEDICINE

## 2023-07-19 PROCEDURE — 99999 PR PBB SHADOW E&M-EST. PATIENT-LVL V: CPT | Mod: PBBFAC,,, | Performed by: INTERNAL MEDICINE

## 2023-07-19 PROCEDURE — 3078F DIAST BP <80 MM HG: CPT | Mod: CPTII,S$GLB,, | Performed by: INTERNAL MEDICINE

## 2023-07-19 PROCEDURE — 1100F PTFALLS ASSESS-DOCD GE2>/YR: CPT | Mod: CPTII,S$GLB,, | Performed by: INTERNAL MEDICINE

## 2023-07-19 PROCEDURE — 3288F FALL RISK ASSESSMENT DOCD: CPT | Mod: CPTII,S$GLB,, | Performed by: INTERNAL MEDICINE

## 2023-07-19 PROCEDURE — 1100F PR PT FALLS ASSESS DOC 2+ FALLS/FALL W/INJURY/YR: ICD-10-PCS | Mod: CPTII,S$GLB,, | Performed by: INTERNAL MEDICINE

## 2023-07-19 PROCEDURE — 1160F RVW MEDS BY RX/DR IN RCRD: CPT | Mod: CPTII,S$GLB,, | Performed by: INTERNAL MEDICINE

## 2023-07-19 PROCEDURE — 99203 OFFICE O/P NEW LOW 30 MIN: CPT | Mod: S$GLB,,, | Performed by: INTERNAL MEDICINE

## 2023-07-19 PROCEDURE — 1159F MED LIST DOCD IN RCRD: CPT | Mod: CPTII,S$GLB,, | Performed by: INTERNAL MEDICINE

## 2023-07-19 PROCEDURE — 3066F PR DOCUMENTATION OF TREATMENT FOR NEPHROPATHY: ICD-10-PCS | Mod: CPTII,S$GLB,, | Performed by: INTERNAL MEDICINE

## 2023-07-19 PROCEDURE — 1160F PR REVIEW ALL MEDS BY PRESCRIBER/CLIN PHARMACIST DOCUMENTED: ICD-10-PCS | Mod: CPTII,S$GLB,, | Performed by: INTERNAL MEDICINE

## 2023-07-19 PROCEDURE — 3074F SYST BP LT 130 MM HG: CPT | Mod: CPTII,S$GLB,, | Performed by: INTERNAL MEDICINE

## 2023-07-19 PROCEDURE — 3288F PR FALLS RISK ASSESSMENT DOCUMENTED: ICD-10-PCS | Mod: CPTII,S$GLB,, | Performed by: INTERNAL MEDICINE

## 2023-07-19 PROCEDURE — 1159F PR MEDICATION LIST DOCUMENTED IN MEDICAL RECORD: ICD-10-PCS | Mod: CPTII,S$GLB,, | Performed by: INTERNAL MEDICINE

## 2023-07-19 PROCEDURE — 1126F PR PAIN SEVERITY QUANTIFIED, NO PAIN PRESENT: ICD-10-PCS | Mod: CPTII,S$GLB,, | Performed by: INTERNAL MEDICINE

## 2023-07-19 PROCEDURE — 3044F HG A1C LEVEL LT 7.0%: CPT | Mod: CPTII,S$GLB,, | Performed by: INTERNAL MEDICINE

## 2023-07-19 PROCEDURE — 3008F BODY MASS INDEX DOCD: CPT | Mod: CPTII,S$GLB,, | Performed by: INTERNAL MEDICINE

## 2023-07-19 PROCEDURE — 1126F AMNT PAIN NOTED NONE PRSNT: CPT | Mod: CPTII,S$GLB,, | Performed by: INTERNAL MEDICINE

## 2023-07-19 PROCEDURE — 99999 PR PBB SHADOW E&M-EST. PATIENT-LVL V: ICD-10-PCS | Mod: PBBFAC,,, | Performed by: INTERNAL MEDICINE

## 2023-07-19 PROCEDURE — 3078F PR MOST RECENT DIASTOLIC BLOOD PRESSURE < 80 MM HG: ICD-10-PCS | Mod: CPTII,S$GLB,, | Performed by: INTERNAL MEDICINE

## 2023-07-19 RX ORDER — SIMETHICONE 125 MG
CAPSULE ORAL
Status: ON HOLD | COMMUNITY
End: 2024-02-02 | Stop reason: HOSPADM

## 2023-07-20 ENCOUNTER — TELEPHONE (OUTPATIENT)
Dept: PULMONOLOGY | Facility: CLINIC | Age: 73
End: 2023-07-20
Payer: MEDICARE

## 2023-07-20 NOTE — TELEPHONE ENCOUNTER
----- Message from Gerri Valadez sent at 7/20/2023  1:21 PM CDT -----  Regarding: ct scan  Good afternoon,    I wanted to inform you all that I have rescheduled Mrs Bautista's ct scan for next week. Her insurance is going to take 7 days to decide whether or not they are going to approve it. I spoke with the patient and she she states she needs the results before her surgery on 07/31/23. I am unsure as to whether her scan will be approved in time for this.    Thank you,  Gerri

## 2023-07-20 NOTE — PROGRESS NOTES
"Subjective:       Patient ID: Lily Green is a 72 y.o. female.    Chief Complaint: Pulmonary Nodules    Consult from Elizabeth Pierre MD for pulmonary nodules    72 year old who developed a perforated intestine and developed severe sepsis with HINA and now HD dependent.  Patient is scheduled for ostomy reversal and a CT of the abdomen was obtained which reported subcentimeter nodules.  Patient is a lifelong nonsmoker without a history of cancer.      Review of Systems    Objective:      Vitals:    07/19/23 1634   BP: 98/62   BP Location: Right arm   Patient Position: Sitting   BP Method: Medium (Manual)   Pulse: 69   SpO2: 96%   Weight: 61.1 kg (134 lb 11.2 oz)   Height: 5' 5" (1.651 m)      Physical Exam    Personal Diagnostic Review  CT of abdomen 1/2023 with RLL infiltrate/pneumonia.  Multiple pulmonary nodules are present.  We are unable to visualize CT of abdomen from [facility] reports pulmonary nodules:  6 mm right lower lobe pulmonary nodule. 9 mm juxtapleural left lower lobe pulmonary nodule.    No flowsheet data found.      Assessment:       1. Solitary pulmonary nodule    2. Lung nodule        Outpatient Encounter Medications as of 7/19/2023   Medication Sig Dispense Refill    atorvastatin (LIPITOR) 40 MG tablet TAKE 1 TABLET BY MOUTH ONCE DAILY 90 tablet 3    blood sugar diagnostic (TRUE METRIX GLUCOSE TEST STRIP) Strp USE ONE STRIP FOR TESTING 2 TIMES A  each 11    blood-glucose meter kit Use to test twice a day 1 each 0    calcium-vitamin D3 (OS-CIPRIANO 500 + D3) 500 mg-5 mcg (200 unit) per tablet Take 1 tablet by mouth once daily. 30 tablet 3    coenzyme Q10 100 mg capsule       cyanocobalamin 1,000 mcg/mL injection Inject 1 mL (1,000 mcg total) into the muscle every 30 days. 10 mL 3    dicyclomine (BENTYL) 10 MG capsule       dronabinoL (MARINOL) 5 MG capsule Take 1 capsule (5 mg total) by mouth 2 (two) times daily before meals. 60 capsule 1    econazole nitrate 1 % cream Apply topically 2 " "(two) times daily. 30 g 2    epoetin noble-epbx (RETACRIT) 10,000 unit/mL imjection Inject 1 mL (10,000 Units total) into the skin every Mon, Wed, Fri. HOLD IF HEMOGLOBIN is 10 or GREATER    DO NOT SHAKE  DO NOT DILUTE  DO NOT MIX with other drug solutions 12 mL 0    erythromycin base (E-MYCIN) 500 MG tablet Take by mouth.      ferrous gluconate (FERGON) 324 MG tablet Take 1 tablet (324 mg total) by mouth daily with breakfast. 30 tablet 3    insulin (LANTUS SOLOSTAR U-100 INSULIN) glargine 100 units/mL SubQ pen Inject 4 units every morning and 3 units every night 15 mL 2    insulin aspart U-100 (NOVOLOG) 100 unit/mL (3 mL) InPn pen Inject before meals: 150-200=+2, 201-250=+4; 251-300=+6; 301-350=+8, over 350=+10 units; inject as needed at bedtime: 150-200=+1, 201-250=+2, 251-300=+3; 301-350=+4, over 350=+5 units 3 mL 0    lancets Misc 1 lancet by Misc.(Non-Drug; Combo Route) route 4 (four) times daily. 400 each 3    meclizine (ANTIVERT) 25 mg tablet TAKE ONE TABLET BY MOUTH THREE TIMES DAILY AS NEEDED FOR DIZZINESS 20 tablet 3    melatonin (MELATIN) 3 mg tablet Take 3 tablets (9 mg total) by mouth nightly as needed for Insomnia. 90 tablet 3    midodrine (PROAMATINE) 5 MG Tab Take 1 tablet (5 mg total) by mouth 2 (two) times daily as needed (if BP is systolic below 90 mmHg and patient is symptomatic. Also if BP is below 90 mmHg during dialysis). 60 tablet 11    mometasone (ELOCON) 0.1 % ointment Apply topically.      neomycin (MYCIFRADIN) 500 mg Tab       nutritional supplements Liqd Take 237 mLs by mouth 4 (four) times daily. 120 each 6    pantoprazole (PROTONIX) 40 MG tablet Take 1 tablet (40 mg total) by mouth once daily. 90 tablet 3    patiromer calcium sorbitex (VELTASSA) 16.8 gram PwPk Take by mouth.      patiromer calcium sorbitex (VELTASSA) 8.4 gram PwPk Take 1 packet (8.4 g total) by mouth 2 (two) times a day. 60 packet 2    pen needle, diabetic (BD ULTRA-FINE MARIS PEN NEEDLE) 32 gauge x 5/32" Ndle 1 Device " by Misc.(Non-Drug; Combo Route) route 4 (four) times daily with meals and nightly. 150 each 11    pregabalin (LYRICA) 150 MG capsule TAKE ONE CAPSULE BY MOUTH 2 TIMES A DAY 60 capsule 2    pregabalin (LYRICA) 75 MG capsule Take 1 capsule (75 mg total) by mouth Daily. Give after HD 30 capsule 0    sevelamer carbonate (RENVELA) 800 mg Tab Take 2 tablets (1,600 mg total) by mouth 3 (three) times daily with meals. DO NOT CRUSH OR CHEW; SWALLOW WHOLE. 180 tablet 11    simethicone (MYLICON) 125 mg Cap capsule Take by mouth.      simethicone (MYLICON) 80 MG chewable tablet Take 1 tablet (80 mg total) by mouth 4 (four) times daily with meals and nightly. 120 tablet 3    torsemide (DEMADEX) 20 MG Tab       vit C,K-Ct-ldrzx-lutein-zeaxan (PRESERVISION AREDS 2) [phone] mg-unit-mg-mg Cap       vitamins  A,C,E-zinc-copper [phone] unit-mg-unit Cap Take 1 tablet by mouth once daily.      B complex-vitamin C-folic acid (MELLISSA-BENJY) 0.8 mg Tab Take 1 tablet (0.8 mg total) by mouth once daily. (Patient not taking: Reported on 7/10/2023) 30 tablet 3    betahistine HCl (BETAHISTINE, BULK, MISC)        No facility-administered encounter medications on file as of 7/19/2023.     Orders Placed This Encounter   Procedures    CT Chest Without Contrast     Standing Status:   Future     Standing Expiration Date:   7/19/2024     Scheduling Instructions:      Chest Navigational Bronchoscopy     Order Specific Question:   May the Radiologist modify the order per protocol to meet the clinical needs of the patient?     Answer:   Yes     Plan:     Problem List Items Addressed This Visit    None  Visit Diagnoses       Solitary pulmonary nodule    -  Primary    Relevant Orders    CT Chest Without Contrast    Lung nodule        Relevant Orders    CT Chest Without Contrast        Likely scar/granuloma from severe infection/sepsis from hospitalization 1/2023.  Recommend CT of chest without contrast to confirm, review and compare from CT of  abdomen in January.  Low risk for malignancy.

## 2023-07-24 DIAGNOSIS — K21.9 GASTROESOPHAGEAL REFLUX DISEASE WITHOUT ESOPHAGITIS: ICD-10-CM

## 2023-07-24 DIAGNOSIS — R42 DIZZINESS: ICD-10-CM

## 2023-07-25 ENCOUNTER — TELEPHONE (OUTPATIENT)
Dept: PRIMARY CARE CLINIC | Facility: CLINIC | Age: 73
End: 2023-07-25
Payer: MEDICARE

## 2023-07-25 RX ORDER — MECLIZINE HYDROCHLORIDE 25 MG/1
25 TABLET ORAL 3 TIMES DAILY PRN
Qty: 90 TABLET | Refills: 3 | Status: SHIPPED | OUTPATIENT
Start: 2023-07-25 | End: 2023-11-22 | Stop reason: SDUPTHER

## 2023-07-25 NOTE — TELEPHONE ENCOUNTER
Pt would like to see if she could come in tomorrow for 1 PM to discuss in clinic, okay to schedule?

## 2023-07-25 NOTE — TELEPHONE ENCOUNTER
Please call pt and tell her that I spoke with Dr. Calvert regarding her upcoming colostomy reversal and would like to see her in clinic to discuss her surgical risk.  Tuesday 1pm if she can do that time

## 2023-07-26 ENCOUNTER — OFFICE VISIT (OUTPATIENT)
Dept: PRIMARY CARE CLINIC | Facility: CLINIC | Age: 73
End: 2023-07-26
Payer: MEDICARE

## 2023-07-26 VITALS
WEIGHT: 135.38 LBS | OXYGEN SATURATION: 96 % | BODY MASS INDEX: 22.56 KG/M2 | SYSTOLIC BLOOD PRESSURE: 90 MMHG | HEIGHT: 65 IN | HEART RATE: 45 BPM | DIASTOLIC BLOOD PRESSURE: 54 MMHG

## 2023-07-26 DIAGNOSIS — Z79.4 TYPE 2 DIABETES MELLITUS WITH DIABETIC POLYNEUROPATHY, WITH LONG-TERM CURRENT USE OF INSULIN: Primary | ICD-10-CM

## 2023-07-26 DIAGNOSIS — E11.42 TYPE 2 DIABETES MELLITUS WITH DIABETIC POLYNEUROPATHY, WITH LONG-TERM CURRENT USE OF INSULIN: Primary | ICD-10-CM

## 2023-07-26 DIAGNOSIS — Z99.2 TYPE 2 DIABETES MELLITUS WITH CHRONIC KIDNEY DISEASE ON CHRONIC DIALYSIS, WITH LONG-TERM CURRENT USE OF INSULIN: ICD-10-CM

## 2023-07-26 DIAGNOSIS — Z93.2 ILEOSTOMY PRESENT: ICD-10-CM

## 2023-07-26 DIAGNOSIS — E11.22 TYPE 2 DIABETES MELLITUS WITH CHRONIC KIDNEY DISEASE ON CHRONIC DIALYSIS, WITH LONG-TERM CURRENT USE OF INSULIN: ICD-10-CM

## 2023-07-26 DIAGNOSIS — Z79.4 TYPE 2 DIABETES MELLITUS WITH CHRONIC KIDNEY DISEASE ON CHRONIC DIALYSIS, WITH LONG-TERM CURRENT USE OF INSULIN: ICD-10-CM

## 2023-07-26 DIAGNOSIS — N18.6 TYPE 2 DIABETES MELLITUS WITH CHRONIC KIDNEY DISEASE ON CHRONIC DIALYSIS, WITH LONG-TERM CURRENT USE OF INSULIN: ICD-10-CM

## 2023-07-26 DIAGNOSIS — Z90.49 S/P TOTAL COLECTOMY: ICD-10-CM

## 2023-07-26 PROCEDURE — 3044F HG A1C LEVEL LT 7.0%: CPT | Mod: CPTII,S$GLB,, | Performed by: INTERNAL MEDICINE

## 2023-07-26 PROCEDURE — 1126F AMNT PAIN NOTED NONE PRSNT: CPT | Mod: CPTII,S$GLB,, | Performed by: INTERNAL MEDICINE

## 2023-07-26 PROCEDURE — 1126F PR PAIN SEVERITY QUANTIFIED, NO PAIN PRESENT: ICD-10-PCS | Mod: CPTII,S$GLB,, | Performed by: INTERNAL MEDICINE

## 2023-07-26 PROCEDURE — 3008F BODY MASS INDEX DOCD: CPT | Mod: CPTII,S$GLB,, | Performed by: INTERNAL MEDICINE

## 2023-07-26 PROCEDURE — 99999 PR PBB SHADOW E&M-EST. PATIENT-LVL III: ICD-10-PCS | Mod: PBBFAC,,, | Performed by: INTERNAL MEDICINE

## 2023-07-26 PROCEDURE — 3066F PR DOCUMENTATION OF TREATMENT FOR NEPHROPATHY: ICD-10-PCS | Mod: CPTII,S$GLB,, | Performed by: INTERNAL MEDICINE

## 2023-07-26 PROCEDURE — 3078F DIAST BP <80 MM HG: CPT | Mod: CPTII,S$GLB,, | Performed by: INTERNAL MEDICINE

## 2023-07-26 PROCEDURE — 3066F NEPHROPATHY DOC TX: CPT | Mod: CPTII,S$GLB,, | Performed by: INTERNAL MEDICINE

## 2023-07-26 PROCEDURE — 3078F PR MOST RECENT DIASTOLIC BLOOD PRESSURE < 80 MM HG: ICD-10-PCS | Mod: CPTII,S$GLB,, | Performed by: INTERNAL MEDICINE

## 2023-07-26 PROCEDURE — 99214 PR OFFICE/OUTPT VISIT, EST, LEVL IV, 30-39 MIN: ICD-10-PCS | Mod: S$GLB,,, | Performed by: INTERNAL MEDICINE

## 2023-07-26 PROCEDURE — 3008F PR BODY MASS INDEX (BMI) DOCUMENTED: ICD-10-PCS | Mod: CPTII,S$GLB,, | Performed by: INTERNAL MEDICINE

## 2023-07-26 PROCEDURE — 3044F PR MOST RECENT HEMOGLOBIN A1C LEVEL <7.0%: ICD-10-PCS | Mod: CPTII,S$GLB,, | Performed by: INTERNAL MEDICINE

## 2023-07-26 PROCEDURE — 99999 PR PBB SHADOW E&M-EST. PATIENT-LVL III: CPT | Mod: PBBFAC,,, | Performed by: INTERNAL MEDICINE

## 2023-07-26 PROCEDURE — 3074F PR MOST RECENT SYSTOLIC BLOOD PRESSURE < 130 MM HG: ICD-10-PCS | Mod: CPTII,S$GLB,, | Performed by: INTERNAL MEDICINE

## 2023-07-26 PROCEDURE — 99214 OFFICE O/P EST MOD 30 MIN: CPT | Mod: S$GLB,,, | Performed by: INTERNAL MEDICINE

## 2023-07-26 PROCEDURE — 3074F SYST BP LT 130 MM HG: CPT | Mod: CPTII,S$GLB,, | Performed by: INTERNAL MEDICINE

## 2023-07-26 NOTE — PROGRESS NOTES
[facility] Note    Chief Complaint      Chief Complaint   Patient presents with    Follow-up       History of Present Illness      Lily Green is a 73 y.o. female with chronic conditions of DM2 with neuropathy, HLD, GERD, celiac disease, Meniere's disease, depression, insomnia, chronic neck pain who presents today for: follow up chronic conditions.  2023, pt had episode of septic shock due to peritonitis and ischemic bowel with necrosis, ARF due to hypovolemia, shock liver.  Pt required total colectomy and ileostomy, transfusion of blood, initiation of hemodialysis.  Transitioned to LTAC and discharged 3/2023.  Since discharge, pt has followed up with CRS, Dr. Calvert, nephrology, Dr. Vyas, endo NP Josefina, pulm, Dr. Monzon. Currently on HD 3x/week.  Undergoing evaluation for ileostomy reversal and anastomosis.   DM2 with neuropathy: Now established with endo AZUL Rocha.  Previously controlled on metformin prior to ARF, transitioned to insulin.  Lantus 4u AM, 3u PM, novolog SSI.  Eye exam UTD with Dr. Rivera 2020.  Foot exam UTD with Dr. Bryan previously.           Past Medical History:  Past Medical History:   Diagnosis Date    Anxiety     Celiac disease 2018    Celiac disease     Depression     Diabetes mellitus     Family history of breast cancer in mother      at age 68    Hyperlipidemia     Hypertension     Meniere disease     Meniere's disease, unspecified ear     Menopause     Peptic ulcer     Reflux esophagitis     Urinary tract infection     Vaginal infection     Vaginal Pap smear 2014    Pap/Hpv Negative        Past Surgical History:   has a past surgical history that includes Tonsillectomy; Appendectomy; Dilation and curettage of uterus (); Hysterectomy (); Carpal tunnel release (Bilateral, 2017); Shoulder surgery ( & ); Colonoscopy (2018); Upper gastrointestinal endoscopy (2018); Dupuytren contracture release  (Bilateral, 09/2017); Breast surgery; Injection of neurolytic agent around lumbar sympathetic nerve (N/A, 1/6/2022); Injection of neurolytic agent around lumbar sympathetic nerve (N/A, 8/25/2022); laparotomy, exploratory (N/A, 1/14/2023); laparotomy, exploratory (N/A, 1/16/2023); closure, colostomy (Left, 1/16/2023); Colostomy (Right, 1/16/2023); Insertion of tunneled central venous hemodialysis catheter (Right, 1/25/2023); removal, tunneled cath (Right, 1/25/2023); insertion, catheter, tunneled (Left, 1/28/2023); and Removal of catheter (Right, 1/28/2023).    Family History:  family history includes Arthritis in her father; Breast cancer in her mother and paternal aunt; Cancer in her mother and paternal aunt; Diabetes in her paternal grandfather; Hyperlipidemia in her sister; Vision loss in her father, mother, and sister.     Social History:  Social History     Tobacco Use    Smoking status: Never    Smokeless tobacco: Never   Substance Use Topics    Alcohol use: No    Drug use: Never       I personally reviewed all past medical, surgical, social and family history.    Review of Systems   Constitutional:  Negative for chills, fever and malaise/fatigue.   Respiratory:  Negative for shortness of breath.    Cardiovascular:  Negative for chest pain.   Gastrointestinal:  Negative for constipation, diarrhea, nausea and vomiting.   Skin:  Negative for rash.   Neurological:  Negative for weakness.   All other systems reviewed and are negative.       Medications:  Outpatient Encounter Medications as of 7/26/2023   Medication Sig Dispense Refill    atorvastatin (LIPITOR) 40 MG tablet TAKE 1 TABLET BY MOUTH ONCE DAILY 90 tablet 3    B complex-vitamin C-folic acid (MELLISSA-BENJY) 0.8 mg Tab Take 1 tablet (0.8 mg total) by mouth once daily. (Patient not taking: Reported on 7/10/2023) 30 tablet 3    betahistine HCl (BETAHISTINE, BULK, MISC)       blood sugar diagnostic (TRUE METRIX GLUCOSE TEST STRIP) Strp USE ONE STRIP FOR TESTING  2 TIMES A  each 11    blood-glucose meter kit Use to test twice a day 1 each 0    calcium-vitamin D3 (OS-CIPRIANO 500 + D3) 500 mg-5 mcg (200 unit) per tablet Take 1 tablet by mouth once daily. 30 tablet 3    coenzyme Q10 100 mg capsule       cyanocobalamin 1,000 mcg/mL injection Inject 1 mL (1,000 mcg total) into the muscle every 30 days. 10 mL 3    dicyclomine (BENTYL) 10 MG capsule       dronabinoL (MARINOL) 5 MG capsule Take 1 capsule (5 mg total) by mouth 2 (two) times daily before meals. 60 capsule 1    econazole nitrate 1 % cream Apply topically 2 (two) times daily. 30 g 2    epoetin noble-epbx (RETACRIT) 10,000 unit/mL imjection Inject 1 mL (10,000 Units total) into the skin every Mon, Wed, Fri. HOLD IF HEMOGLOBIN is 10 or GREATER    DO NOT SHAKE  DO NOT DILUTE  DO NOT MIX with other drug solutions 12 mL 0    erythromycin base (E-MYCIN) 500 MG tablet Take by mouth.      ferrous gluconate (FERGON) 324 MG tablet Take 1 tablet (324 mg total) by mouth daily with breakfast. 30 tablet 3    insulin (LANTUS SOLOSTAR U-100 INSULIN) glargine 100 units/mL SubQ pen Inject 4 units every morning and 3 units every night 15 mL 2    insulin aspart U-100 (NOVOLOG) 100 unit/mL (3 mL) InPn pen Inject before meals: 150-200=+2, 201-250=+4; 251-300=+6; 301-350=+8, over 350=+10 units; inject as needed at bedtime: 150-200=+1, 201-250=+2, 251-300=+3; 301-350=+4, over 350=+5 units 3 mL 0    lancets Misc 1 lancet by Misc.(Non-Drug; Combo Route) route 4 (four) times daily. 400 each 3    meclizine (ANTIVERT) 25 mg tablet Take 1 tablet (25 mg total) by mouth 3 (three) times daily as needed for Dizziness. 90 tablet 3    melatonin (MELATIN) 3 mg tablet Take 3 tablets (9 mg total) by mouth nightly as needed for Insomnia. 90 tablet 3    midodrine (PROAMATINE) 5 MG Tab Take 1 tablet (5 mg total) by mouth 2 (two) times daily as needed (if BP is systolic below 90 mmHg and patient is symptomatic. Also if BP is below 90 mmHg during dialysis). 60  "tablet 11    mometasone (ELOCON) 0.1 % ointment Apply topically.      neomycin (MYCIFRADIN) 500 mg Tab       nutritional supplements Liqd Take 237 mLs by mouth 4 (four) times daily. 120 each 6    pantoprazole (PROTONIX) 40 MG tablet Take 1 tablet (40 mg total) by mouth once daily. 90 tablet 3    patiromer calcium sorbitex (VELTASSA) 16.8 gram PwPk Take by mouth.      patiromer calcium sorbitex (VELTASSA) 8.4 gram PwPk Take 1 packet (8.4 g total) by mouth 2 (two) times a day. 60 packet 2    pen needle, diabetic (BD ULTRA-FINE MARIS PEN NEEDLE) 32 gauge x 5/32" Ndle 1 Device by Misc.(Non-Drug; Combo Route) route 4 (four) times daily with meals and nightly. 150 each 11    pregabalin (LYRICA) 150 MG capsule TAKE ONE CAPSULE BY MOUTH 2 TIMES A DAY 60 capsule 2    pregabalin (LYRICA) 75 MG capsule Take 1 capsule (75 mg total) by mouth Daily. Give after HD 30 capsule 0    sevelamer carbonate (RENVELA) 800 mg Tab Take 2 tablets (1,600 mg total) by mouth 3 (three) times daily with meals. DO NOT CRUSH OR CHEW; SWALLOW WHOLE. 180 tablet 11    simethicone (MYLICON) 125 mg Cap capsule Take by mouth.      simethicone (MYLICON) 80 MG chewable tablet Take 1 tablet (80 mg total) by mouth 4 (four) times daily with meals and nightly. 120 tablet 3    torsemide (DEMADEX) 20 MG Tab       vit C,H-Hh-cbhnc-lutein-zeaxan (PRESERVISION AREDS 2) [phone] mg-unit-mg-mg Cap       vitamins  A,C,E-zinc-copper 14,320-226-200 unit-mg-unit Cap Take 1 tablet by mouth once daily.       No facility-administered encounter medications on file as of 7/26/2023.       Allergies:  Review of patient's allergies indicates:   Allergen Reactions    Crestor [rosuvastatin] Swelling    Gluten protein        Health Maintenance:  Immunization History   Administered Date(s) Administered    COVID-19, MRNA, LN-S, PF (Pfizer) (Purple Cap) 02/13/2021, 03/06/2021    Influenza 11/30/2012    Influenza (FLUAD) - Quadrivalent - Adjuvanted - PF *Preferred* (65+) 10/03/2020 " "   Influenza Split 10/15/2019    Pneumococcal Conjugate - 13 Valent 04/27/2018    Pneumococcal Polysaccharide - 23 Valent 10/14/2019      Health Maintenance   Topic Date Due    Hepatitis C Screening  Never done    TETANUS VACCINE  Never done    Eye Exam  12/12/2023    Mammogram  12/28/2023    Hemoglobin A1c  01/11/2024    Lipid Panel  01/14/2024    Foot Exam  05/31/2024    DEXA Scan  01/03/2026        Physical Exam      Vital Signs  Pulse: (!) 45  SpO2: 96 %  BP: (!) 90/54  BP Location: Right arm  Patient Position: Sitting  Pain Score: 0-No pain  Height and Weight  Height: 5' 5" (165.1 cm)  Weight: 61.4 kg (135 lb 5.8 oz)  BSA (Calculated - sq m): 1.68 sq meters  BMI (Calculated): 22.5  Weight in (lb) to have BMI = 25: 149.9]    Physical Exam  Vitals reviewed.   Constitutional:       Appearance: She is well-developed.   HENT:      Head: Normocephalic and atraumatic.      Right Ear: External ear normal.      Left Ear: External ear normal.   Cardiovascular:      Rate and Rhythm: Normal rate and regular rhythm.      Heart sounds: Normal heart sounds. No murmur heard.  Pulmonary:      Effort: Pulmonary effort is normal.      Breath sounds: Normal breath sounds. No wheezing or rales.   Abdominal:      General: Bowel sounds are normal. There is no distension.      Palpations: Abdomen is soft.      Tenderness: There is no abdominal tenderness.          Laboratory:  CBC:  Recent Labs   Lab 03/01/23  0518 03/03/23  0515 03/06/23  0556   WBC 6.64 8.82 8.55   RBC 2.98 L 3.12 L 3.24 L   Hemoglobin 8.4 L 9.1 L 9.4 L   Hematocrit 28.2 L 28.6 L 30.9 L   Platelets 399 319 335   MCV 95 92 95   MCH 28.2 29.2 29.0   MCHC 29.8 L 31.8 L 30.4 L     CMP:  Recent Labs   Lab 03/01/23  0518 03/03/23  0515 03/06/23  0556   Glucose 117 H 179 H 98   Calcium 10.3 10.0 10.2   Albumin 3.0 L  3.0 L 3.1 L  3.1 L 3.1 L  3.1 L   Total Protein 8.7 H 8.9 H 8.9 H   Sodium 130 L 123 L 128 L   Potassium 5.2 H 4.6 6.2 H   CO2 17 L 19 L 16 L   Chloride 97 " 90 L 91 L   BUN 91 H 96 H 129 H   Alkaline Phosphatase 141 H 147 H 136 H   ALT 16 13 14   AST 19 17 17   Total Bilirubin 0.3 0.3 0.5     URINALYSIS:  Recent Labs   Lab 12/23/20  1344 10/11/21  1457 01/15/23  0120   Color, UA Yellow   < > Yellow   Clarity, UA Clear  --   --    Specific Gravity, UA  --    < > 1.015   Spec Grav UA 1.020  --   --    pH, UA 5.0   < > 7.0   Protein, UA  --    < > Trace A   Bacteria  --   --  None   Nitrite, UA -   < > Negative   Leukocytes, UA  --    < > Negative   Urobilinogen, UA 0.2   < > Negative    < > = values in this interval not displayed.      LIPIDS:  Recent Labs   Lab 10/19/21  0908 04/22/22  0910 11/01/22  0901 01/13/23  2348 01/14/23  1247   TSH  --   --   --  1.821  --    HDL 58 52 54  --   --    Cholesterol 194 138 149  --   --    Triglycerides 160 H 108 90  --  96   LDL Cholesterol 104.0 64.4 77.0  --   --    HDL/Cholesterol Ratio 29.9 37.7 36.2  --   --    Non-HDL Cholesterol 136 86 95  --   --    Total Cholesterol/HDL Ratio 3.3 2.7 2.8  --   --      TSH:  Recent Labs   Lab 01/13/23  2348   TSH 1.821     A1C:  Recent Labs   Lab 10/20/20  0842 04/20/21  0754 10/19/21  0907 04/22/22  0910 11/01/22  0901 07/11/23  0707   Hemoglobin A1C 6.0 H 6.1 H 6.2 H 6.0 H 5.8 H 5.3       Assessment/Plan     Lily Green is a 73 y.o.female with:    1. Type 2 diabetes mellitus with diabetic polyneuropathy, with long-term current use of insulin  2. Type 2 diabetes mellitus with chronic kidney disease on chronic dialysis, with long-term current use of insulin  Continue current meds.  F/U with endo and nephrology  3. Ileostomy present  4. S/P total colectomy  Discussed perioperative risk considering her dialysis dependence and hypotension issues.  Also discussed risk considering somewhat recent life threatening illness (severe colitis infectious vs ischemic requiring total colectomy).  Pt prefers to proceed with ileostomy reversal with Dr. Calvert in part due to feeling generally  stronger and the unlikeliness that her kidney function will improve at this point.  Medically she seems as stable as she can expect in the near future.    Chronic conditions status updated as per HPI.  Other than changes above, cont current medications and maintain follow up with specialists.  Follow up in about 3 months (around 10/26/2023) for Follow up visit.    Future Appointments   Date Time Provider Department Center   8/2/2023  9:00 AM Noe Guevara NP [facility] [place]   8/9/2023  3:00 PM Pavel Calixto MD [facility] PRICRE [place]   8/21/2023 12:30 PM Sherine Rocha NP [facility] ENDOCRN [facility]   9/13/2023  1:20 PM Lucas Bryan DPM [facility] POD Diamond Calixto MD  [facility]

## 2023-08-02 ENCOUNTER — OFFICE VISIT (OUTPATIENT)
Dept: UROLOGY | Facility: CLINIC | Age: 73
End: 2023-08-02
Payer: MEDICARE

## 2023-08-02 VITALS
DIASTOLIC BLOOD PRESSURE: 68 MMHG | BODY MASS INDEX: 22.06 KG/M2 | HEART RATE: 79 BPM | WEIGHT: 132.38 LBS | SYSTOLIC BLOOD PRESSURE: 108 MMHG | HEIGHT: 65 IN

## 2023-08-02 DIAGNOSIS — R39.15 URINARY URGENCY: ICD-10-CM

## 2023-08-02 DIAGNOSIS — R35.0 URINARY FREQUENCY: Primary | ICD-10-CM

## 2023-08-02 DIAGNOSIS — Z99.2 DIALYSIS PATIENT: ICD-10-CM

## 2023-08-02 DIAGNOSIS — N20.0 LEFT NEPHROLITHIASIS: ICD-10-CM

## 2023-08-02 PROCEDURE — 3288F FALL RISK ASSESSMENT DOCD: CPT | Mod: CPTII,S$GLB,, | Performed by: NURSE PRACTITIONER

## 2023-08-02 PROCEDURE — 1100F PTFALLS ASSESS-DOCD GE2>/YR: CPT | Mod: CPTII,S$GLB,, | Performed by: NURSE PRACTITIONER

## 2023-08-02 PROCEDURE — 4010F ACE/ARB THERAPY RXD/TAKEN: CPT | Mod: CPTII,S$GLB,, | Performed by: NURSE PRACTITIONER

## 2023-08-02 PROCEDURE — 3078F DIAST BP <80 MM HG: CPT | Mod: CPTII,S$GLB,, | Performed by: NURSE PRACTITIONER

## 2023-08-02 PROCEDURE — 3008F BODY MASS INDEX DOCD: CPT | Mod: CPTII,S$GLB,, | Performed by: NURSE PRACTITIONER

## 2023-08-02 PROCEDURE — 99999 PR PBB SHADOW E&M-EST. PATIENT-LVL V: CPT | Mod: PBBFAC,,, | Performed by: NURSE PRACTITIONER

## 2023-08-02 PROCEDURE — 3066F NEPHROPATHY DOC TX: CPT | Mod: CPTII,S$GLB,, | Performed by: NURSE PRACTITIONER

## 2023-08-02 PROCEDURE — 3074F SYST BP LT 130 MM HG: CPT | Mod: CPTII,S$GLB,, | Performed by: NURSE PRACTITIONER

## 2023-08-02 PROCEDURE — 1126F AMNT PAIN NOTED NONE PRSNT: CPT | Mod: CPTII,S$GLB,, | Performed by: NURSE PRACTITIONER

## 2023-08-02 PROCEDURE — 1157F ADVNC CARE PLAN IN RCRD: CPT | Mod: CPTII,S$GLB,, | Performed by: NURSE PRACTITIONER

## 2023-08-02 PROCEDURE — 99214 OFFICE O/P EST MOD 30 MIN: CPT | Mod: S$GLB,,, | Performed by: NURSE PRACTITIONER

## 2023-08-02 RX ORDER — CIPROFLOXACIN 500 MG/1
500 TABLET ORAL EVERY 12 HOURS
Qty: 14 TABLET | Refills: 0 | Status: SHIPPED | OUTPATIENT
Start: 2023-08-02 | End: 2023-08-04 | Stop reason: ALTCHOICE

## 2023-08-02 NOTE — PROGRESS NOTES
"Subjective:       Patient ID: Lily Green is a 74 y.o. female.    Chief Complaint: Urinary Tract Infection    Patient is here today for evaluation of UTI. Dialysis started 1/2023. T/TH/Sat dialysis days. Patient is scheduled for colostomy reverse on Monday.     Urinary Frequency   This is a new problem. The current episode started yesterday. Associated symptoms include frequency and urgency. Pertinent negatives include no chills, flank pain, hematuria, nausea or vomiting.     Review of Systems   Constitutional:  Positive for fatigue. Negative for chills and fever.   Gastrointestinal:  Negative for abdominal pain, nausea and vomiting.        Colostomy   Genitourinary:  Positive for decreased urine volume, frequency, nocturia (x0-1) and urgency. Negative for dysuria, flank pain and hematuria. Genital sores: since starting dialysis.  Neurological:  Negative for weakness and headaches.        "Off-balance"   Psychiatric/Behavioral: Negative.           Objective:      Physical Exam  Vitals and nursing note reviewed.   Constitutional:       General: She is not in acute distress.     Appearance: She is well-developed and normal weight. She is not ill-appearing.   HENT:      Head: Normocephalic and atraumatic.   Eyes:      Pupils: Pupils are equal, round, and reactive to light.   Cardiovascular:      Rate and Rhythm: Normal rate.   Pulmonary:      Effort: Pulmonary effort is normal. No respiratory distress.   Abdominal:      Palpations: Abdomen is soft.      Tenderness: There is no abdominal tenderness.   Musculoskeletal:         General: Normal range of motion.      Cervical back: Normal range of motion.   Skin:     General: Skin is warm and dry.   Neurological:      Mental Status: She is alert and oriented to person, place, and time.      Coordination: Coordination normal.   Psychiatric:         Mood and Affect: Mood normal.         Behavior: Behavior normal.         Thought Content: Thought content normal.   " "      Judgment: Judgment normal.         Assessment:       Problem List Items Addressed This Visit    None  Visit Diagnoses       Urinary frequency    -  Primary    Relevant Orders    Urine culture    Urinalysis    Urine culture (Completed)    Urinalysis (Completed)    Urinary urgency        Relevant Orders    Urine culture    Urinalysis    Left nephrolithiasis        Relevant Orders    Urine culture    Urinalysis    Dialysis patient        Relevant Orders    Urine culture    Urinalysis            Plan:           Lily Montesinos" was seen today for urinary tract infection.    Diagnoses and all orders for this visit:    Urinary frequency  -     Urine culture  -     Urinalysis  -     Discontinue: ciprofloxacin HCl (CIPRO) 500 MG tablet; Take 1 tablet (500 mg total) by mouth every 12 (twelve) hours. for 7 days  -     Urine culture; Future  -     Urinalysis; Future    Urinary urgency  -     Urine culture  -     Urinalysis  -     Discontinue: ciprofloxacin HCl (CIPRO) 500 MG tablet; Take 1 tablet (500 mg total) by mouth every 12 (twelve) hours. for 7 days    Left nephrolithiasis  -     Urine culture  -     Urinalysis    Dialysis patient  -     Urine culture  -     Urinalysis    Follow-up in 8 weeks for evaluation of left kidney stone or sooner if needed.     Noe Guevara NP        "

## 2023-08-02 NOTE — PATIENT INSTRUCTIONS
U/A and urine cx (today)  Follow-up in 8 weeks for evaluation of left kidney stone or sooner if needed.

## 2023-08-04 ENCOUNTER — PATIENT MESSAGE (OUTPATIENT)
Dept: UROLOGY | Facility: CLINIC | Age: 73
End: 2023-08-04
Payer: MEDICARE

## 2023-08-04 DIAGNOSIS — N30.00 ACUTE CYSTITIS WITHOUT HEMATURIA: Primary | ICD-10-CM

## 2023-08-04 RX ORDER — AMOXICILLIN AND CLAVULANATE POTASSIUM 500; 125 MG/1; MG/1
1 TABLET, FILM COATED ORAL 2 TIMES DAILY
Qty: 14 TABLET | Refills: 0 | Status: SHIPPED | OUTPATIENT
Start: 2023-08-04 | End: 2023-08-11

## 2023-08-04 NOTE — PROGRESS NOTES
Diagnoses and all orders for this visit:    Acute cystitis without hematuria  -     amoxicillin-clavulanate 500-125mg (AUGMENTIN) 500-125 mg Tab; Take 1 tablet (500 mg total) by mouth 2 (two) times daily. for 7 days    Other order:  Discontinue Cipro at this time.     NOTE: During visit patient stated she was not taking erythromycin when medication list was reviewed. Therefore, Cipro was prescribed because Bactrim and Macrobid was contraindicated with ESRD. She reports her surgeon gave her an antibiotic to take prior to her reverse ileostomy surgery on 8/7/2023, but she could not remember antibiotic name. Instructed patient to review meds and home and send a message with the name of antibiotic prescribed by surgeon. Patient sent message today stating she was prescribed erythromycin and neomycin.     Noe Guevara NP

## 2023-08-07 ENCOUNTER — TELEPHONE (OUTPATIENT)
Dept: UROLOGY | Facility: CLINIC | Age: 73
End: 2023-08-07
Payer: MEDICARE

## 2023-08-07 NOTE — TELEPHONE ENCOUNTER
Pt informed via phone call.    ----- Message from Noe Guevara NP sent at 8/7/2023  9:49 AM CDT -----  Please inform patient via telephone that her urine cx came back positive for a UTI. Augmentin was prescribed last week. Patient can continue taking antibiotic and repeat urine cx in 2 weeks if urinary symptoms fail to improve. Thanks.

## 2023-08-08 ENCOUNTER — TELEPHONE (OUTPATIENT)
Dept: PULMONOLOGY | Facility: CLINIC | Age: 73
End: 2023-08-08
Payer: MEDICARE

## 2023-08-08 DIAGNOSIS — R91.1 NODULE OF RIGHT LUNG: Primary | ICD-10-CM

## 2023-08-08 NOTE — TELEPHONE ENCOUNTER
Patient with ostomy reversal yesterday.  Called to review CT results with persistent nodule that may be stable (requesting radiology to confirm).  Will obtain PET in 4 weeks to allow surgical recovery

## 2023-08-09 NOTE — TELEPHONE ENCOUNTER
Left message on pt voicemail, informing her that I'm contacting her in regards to scheduling her PET scan 4 weeks from now Per Dr Monzon. I also advised pt that if she has any questions or concerns, she may contact the office. Office number has been provided.

## 2023-08-12 ENCOUNTER — OFFICE VISIT (OUTPATIENT)
Dept: URGENT CARE | Facility: CLINIC | Age: 73
End: 2023-08-12
Payer: MEDICARE

## 2023-08-12 VITALS
DIASTOLIC BLOOD PRESSURE: 55 MMHG | WEIGHT: 132 LBS | HEIGHT: 65 IN | OXYGEN SATURATION: 98 % | RESPIRATION RATE: 18 BRPM | TEMPERATURE: 99 F | HEART RATE: 78 BPM | SYSTOLIC BLOOD PRESSURE: 98 MMHG | BODY MASS INDEX: 21.99 KG/M2

## 2023-08-12 DIAGNOSIS — Z20.822 CLOSE EXPOSURE TO COVID-19 VIRUS: Primary | ICD-10-CM

## 2023-08-12 LAB
CTP QC/QA: YES
SARS-COV-2 AG RESP QL IA.RAPID: NEGATIVE

## 2023-08-12 PROCEDURE — 87811 SARS-COV-2 COVID19 W/OPTIC: CPT | Mod: QW,S$GLB,, | Performed by: PHYSICIAN ASSISTANT

## 2023-08-12 PROCEDURE — 87811 SARS CORONAVIRUS 2 ANTIGEN POCT, MANUAL READ: ICD-10-PCS | Mod: QW,S$GLB,, | Performed by: PHYSICIAN ASSISTANT

## 2023-08-12 PROCEDURE — 99214 PR OFFICE/OUTPT VISIT, EST, LEVL IV, 30-39 MIN: ICD-10-PCS | Mod: S$GLB,,, | Performed by: PHYSICIAN ASSISTANT

## 2023-08-12 PROCEDURE — 99214 OFFICE O/P EST MOD 30 MIN: CPT | Mod: S$GLB,,, | Performed by: PHYSICIAN ASSISTANT

## 2023-08-12 RX ORDER — OXYCODONE HYDROCHLORIDE 5 MG/1
5 TABLET ORAL EVERY 4 HOURS PRN
Status: ON HOLD | COMMUNITY
Start: 2023-08-11 | End: 2024-02-02 | Stop reason: HOSPADM

## 2023-08-12 NOTE — PROGRESS NOTES
"Subjective:      Patient ID: Lily Green is a 73 y.o. female.    Vitals:  height is 5' 5" (1.651 m) and weight is 59.9 kg (132 lb). Her oral temperature is 98.5 °F (36.9 °C). Her blood pressure is 95/51 (abnormal) and her pulse is 78. Her respiration is 18 and oxygen saturation is 98%.     Chief Complaint: Sinus Problem    Patient stated she was exposed to covid. Her congestion started 2 days ago.  Her exposure was 3 days ago.  Patient with ileostomy reversal approximately a week ago doing well.  Family member at home recently diagnosed with COVID patient is here with concern that she may have COVID    Sinus Problem  This is a new problem. The current episode started in the past 7 days. There has been no fever. Her pain is at a severity of 0/10. She is experiencing no pain. Associated symptoms include congestion. Pertinent negatives include no chills, coughing, diaphoresis, ear pain, headaches, hoarse voice, neck pain, shortness of breath, sinus pressure, sneezing, sore throat or swollen glands. Past treatments include nothing. The treatment provided no relief.       Constitution: Negative for chills and sweating.   HENT:  Positive for congestion. Negative for ear pain, sinus pressure and sore throat.    Neck: Negative for neck pain.   Respiratory:  Negative for cough and shortness of breath.    Skin:  Negative for erythema.   Allergic/Immunologic: Negative for sneezing.   Neurological:  Negative for headaches.      Objective:     Physical Exam   Constitutional: She is oriented to person, place, and time. She appears well-developed. She is cooperative.  Non-toxic appearance. She does not appear ill. No distress.   HENT:   Head: Normocephalic and atraumatic.   Ears:   Right Ear: Hearing, tympanic membrane, external ear and ear canal normal.   Left Ear: Hearing, tympanic membrane, external ear and ear canal normal.   Nose: Nose normal. No mucosal edema, rhinorrhea or nasal deformity. No epistaxis. Right " sinus exhibits no maxillary sinus tenderness and no frontal sinus tenderness. Left sinus exhibits no maxillary sinus tenderness and no frontal sinus tenderness.   Mouth/Throat: Uvula is midline, oropharynx is clear and moist and mucous membranes are normal. No trismus in the jaw. Normal dentition. No uvula swelling. No oropharyngeal exudate, posterior oropharyngeal edema or posterior oropharyngeal erythema.   Eyes: Conjunctivae, EOM and lids are normal. Pupils are equal, round, and reactive to light. Right eye exhibits no discharge. Left eye exhibits no discharge. No scleral icterus.   Neck: Trachea normal and phonation normal. Neck supple. No JVD present. No tracheal deviation present. No thyromegaly present. No edema present. No erythema present. No neck rigidity present.   Cardiovascular: Normal rate, regular rhythm, normal heart sounds and normal pulses.   No murmur heard.Exam reveals no gallop and no friction rub.   Pulmonary/Chest: Effort normal and breath sounds normal. No stridor. No respiratory distress. She has no decreased breath sounds. She has no wheezes. She has no rhonchi. She has no rales. She exhibits no tenderness.   Abdominal: Normal appearance. Soft. There is no rebound.      Comments: Patient has a midline healing surgical incision site that is intact and dry and clean.  No surrounding erythema.  No acute abdominal findings.   Musculoskeletal: Normal range of motion.         General: No deformity. Normal range of motion.   Neurological: She is alert and oriented to person, place, and time. She exhibits normal muscle tone. Coordination normal.   Skin: Skin is warm, dry, intact, not diaphoretic, not pale and no rash. Capillary refill takes less than 2 seconds. No erythema   Psychiatric: Her speech is normal and behavior is normal. Judgment and thought content normal.   Nursing note and vitals reviewed.    Results for orders placed or performed in visit on 08/12/23   SARS Coronavirus 2 Antigen,  POCT Manual Read   Result Value Ref Range    SARS Coronavirus 2 Antigen Negative Negative     Acceptable Yes       Assessment:     1. Close exposure to COVID-19 virus        Plan:       Close exposure to COVID-19 virus  -     SARS Coronavirus 2 Antigen, POCT Manual Read        Follow up if symptoms worsen or fail to improve, for F/U with PCP or ED.   Patient Instructions   IF YOU DEVELOP FEVER CHILLS BODY ACHES OR ANY OTHER UPPER RESPIRATORY TRACT INFECTION SYMPTOMS, REPEAT COVID SWAB AT HOME OR COME IN FOR RE-EVALUATION.

## 2023-08-12 NOTE — PATIENT INSTRUCTIONS
IF YOU DEVELOP FEVER CHILLS BODY ACHES OR ANY OTHER UPPER RESPIRATORY TRACT INFECTION SYMPTOMS, REPEAT COVID SWAB AT HOME OR COME IN FOR RE-EVALUATION.

## 2023-08-15 ENCOUNTER — TELEPHONE (OUTPATIENT)
Dept: URGENT CARE | Facility: CLINIC | Age: 73
End: 2023-08-15
Payer: MEDICARE

## 2023-08-21 ENCOUNTER — OFFICE VISIT (OUTPATIENT)
Dept: ENDOCRINOLOGY | Facility: CLINIC | Age: 73
End: 2023-08-21
Payer: MEDICARE

## 2023-08-21 VITALS
DIASTOLIC BLOOD PRESSURE: 70 MMHG | WEIGHT: 131 LBS | TEMPERATURE: 98 F | SYSTOLIC BLOOD PRESSURE: 118 MMHG | BODY MASS INDEX: 21.8 KG/M2 | HEART RATE: 60 BPM

## 2023-08-21 DIAGNOSIS — Z99.2 ESRD (END STAGE RENAL DISEASE) ON DIALYSIS: ICD-10-CM

## 2023-08-21 DIAGNOSIS — E11.29 TYPE 2 DIABETES MELLITUS WITH OTHER KIDNEY COMPLICATION, UNSPECIFIED WHETHER LONG TERM INSULIN USE: Primary | ICD-10-CM

## 2023-08-21 DIAGNOSIS — N18.6 ESRD (END STAGE RENAL DISEASE) ON DIALYSIS: ICD-10-CM

## 2023-08-21 PROCEDURE — 3008F BODY MASS INDEX DOCD: CPT | Mod: CPTII,S$GLB,, | Performed by: NURSE PRACTITIONER

## 2023-08-21 PROCEDURE — 3074F PR MOST RECENT SYSTOLIC BLOOD PRESSURE < 130 MM HG: ICD-10-PCS | Mod: CPTII,S$GLB,, | Performed by: NURSE PRACTITIONER

## 2023-08-21 PROCEDURE — 3078F PR MOST RECENT DIASTOLIC BLOOD PRESSURE < 80 MM HG: ICD-10-PCS | Mod: CPTII,S$GLB,, | Performed by: NURSE PRACTITIONER

## 2023-08-21 PROCEDURE — 1159F PR MEDICATION LIST DOCUMENTED IN MEDICAL RECORD: ICD-10-PCS | Mod: CPTII,S$GLB,, | Performed by: NURSE PRACTITIONER

## 2023-08-21 PROCEDURE — 1159F MED LIST DOCD IN RCRD: CPT | Mod: CPTII,S$GLB,, | Performed by: NURSE PRACTITIONER

## 2023-08-21 PROCEDURE — 3074F SYST BP LT 130 MM HG: CPT | Mod: CPTII,S$GLB,, | Performed by: NURSE PRACTITIONER

## 2023-08-21 PROCEDURE — 99214 OFFICE O/P EST MOD 30 MIN: CPT | Mod: S$GLB,,, | Performed by: NURSE PRACTITIONER

## 2023-08-21 PROCEDURE — 3008F PR BODY MASS INDEX (BMI) DOCUMENTED: ICD-10-PCS | Mod: CPTII,S$GLB,, | Performed by: NURSE PRACTITIONER

## 2023-08-21 PROCEDURE — 3066F NEPHROPATHY DOC TX: CPT | Mod: CPTII,S$GLB,, | Performed by: NURSE PRACTITIONER

## 2023-08-21 PROCEDURE — 99214 PR OFFICE/OUTPT VISIT, EST, LEVL IV, 30-39 MIN: ICD-10-PCS | Mod: S$GLB,,, | Performed by: NURSE PRACTITIONER

## 2023-08-21 PROCEDURE — 3044F HG A1C LEVEL LT 7.0%: CPT | Mod: CPTII,S$GLB,, | Performed by: NURSE PRACTITIONER

## 2023-08-21 PROCEDURE — 3078F DIAST BP <80 MM HG: CPT | Mod: CPTII,S$GLB,, | Performed by: NURSE PRACTITIONER

## 2023-08-21 PROCEDURE — 1160F PR REVIEW ALL MEDS BY PRESCRIBER/CLIN PHARMACIST DOCUMENTED: ICD-10-PCS | Mod: CPTII,S$GLB,, | Performed by: NURSE PRACTITIONER

## 2023-08-21 PROCEDURE — 3044F PR MOST RECENT HEMOGLOBIN A1C LEVEL <7.0%: ICD-10-PCS | Mod: CPTII,S$GLB,, | Performed by: NURSE PRACTITIONER

## 2023-08-21 PROCEDURE — 3066F PR DOCUMENTATION OF TREATMENT FOR NEPHROPATHY: ICD-10-PCS | Mod: CPTII,S$GLB,, | Performed by: NURSE PRACTITIONER

## 2023-08-21 PROCEDURE — 1160F RVW MEDS BY RX/DR IN RCRD: CPT | Mod: CPTII,S$GLB,, | Performed by: NURSE PRACTITIONER

## 2023-08-21 PROCEDURE — 99999 PR PBB SHADOW E&M-EST. PATIENT-LVL V: ICD-10-PCS | Mod: PBBFAC,,, | Performed by: NURSE PRACTITIONER

## 2023-08-21 PROCEDURE — 99999 PR PBB SHADOW E&M-EST. PATIENT-LVL V: CPT | Mod: PBBFAC,,, | Performed by: NURSE PRACTITIONER

## 2023-08-21 RX ORDER — INSULIN ASPART 100 [IU]/ML
INJECTION, SOLUTION INTRAVENOUS; SUBCUTANEOUS
Qty: 15 ML | Refills: 0 | Status: SHIPPED | OUTPATIENT
Start: 2023-08-21

## 2023-08-21 NOTE — PATIENT INSTRUCTIONS
No Lantus at this time.   Test glucose 1-2x/day. Pre-meal glucoses should be less than 150. Post-meal glucoses should be less than 200.     If premeal glucoses are higher than 150, use Novolog (orange pen) as needed with correction scale:   Blood sugar 150 to 200, + 1 unit  Blood sugar 201 to 250, + 2 units  Blood sugar 251 to 300, + 3 units  Blood sugar 301 to 350, + 4 units   Blood sugar greater than 350, + 5 units    If glucoses are consistently high, contact office and have glucose log ready to send in.     Return to clinic in 4 months. Bring written logs to every visit.

## 2023-08-21 NOTE — PROGRESS NOTES
CC: This 73 y.o. White female  is here for evaluation of  T2DM along with comorbidities indicated in the Visit Diagnosis section of this encounter.    HPI: Lily Green was diagnosed with T2DM in 50s. Metformin started at the time of diagnosis.   Med hx: celiac disease, ESRD on T/Th/Sat      DM COMPLICATIONS: nephropathy    Initial visit 5/2023  New to Endocrine. Arrives with  Deric and caregiver Rocky.   Pt's has long-standing h/o controlled diabetes on metformin 1250 mg/day. She had a  prolonged hospitalization from 1/26-3/6/23 for peritonitis. She underwent  total colectomy, now with a colostomy. She also now has ESRD on HD on MWF. Metformin was stopped during her hospital admission and she was started on insulin.   Plan A1c may reflect falsely low glucose average d/t anemia. Her complete glucose logs show good glucose control but fasting glucoses are a bit low. Advised:   Reviewed how to rotate insulin injection sites.   Reduce Lantus to 4 units AM and 3 units PM.   If glucoses continue to be less than 80, then reduce to 3 units twice daily.   Continue Novolog sliding scale.   Will send orders for Dexcom G7  Continuous Glucose Monitor (CGM). Pt will start if affordable. Will need to see diabetes educator for Dexcom training.   With fingersticks, ok to reduce to testing 2x/day before/2 hours after meals. Glucoses after meals should be less than 200. Goal for glucoses before meals ~ .   Return to clinic in 3 months with labs prior.     Interval hx Arrives with  Deric and caregiver Rocky.   A1c down from 5.8 to 5.3%. Of note, pt has chronic anemia so A1c indicates falsely low glucoses.     Underwent colostomy closure on 8/7.   She had been taking Lantus at 4 units bid. Stopped using Dexcom d/t alarms.     She has not been taking insulin since sx as her appetite has been quite low and glucoses have remained controlled. She did take Lantus 4 units last night because BG was a bit  high at 151. This was 2 hours after a NovoSource Renal drink (43 grams CHO).          LAST DIABETES EDUCATION: none, has attended a celiac disease and diabetes seminar years ago.     HOSPITALIZED FOR DIABETES  -  No.    SIGNIFICANT DIABETES MED HISTORY: n/a     PRESCRIBED DIABETES MEDICATIONS:   Lantus Solostar 4 units AM and 3 units PM (8:30 am  and 6:30 pm)  Novolog ss ac: 150-200=+2, 201-250=+4; 251-300=+6; 301-350=+8, over 350=+10 units  Novolog ss hs: 150-200=+1, 201-250=+2, 251-300=+3; 301-350=+4, over 350=+5 units    Misses medication doses - as above     Rotates insulin injections:  abdomen    Changes insulin pen needles: yes      SELF MONITORING BLOOD GLUCOSE: Checks blood glucose at home 1x/day.              HYPOGLYCEMIC EPISODES: none recent         CURRENT DIET: drinks water and 1 decaf coffee in AM, Novosource protein shake. Eats 3 meals/day. No snacks.   Dinner 6:30 to 7     Typical diet   breakfast - 2 eggs   Lunch - fish, cabbage.   Supper - fish, cabbage     CURRENT EXERCISE:       /70   Pulse 60   Temp 98 °F (36.7 °C)   Wt 59.4 kg (131 lb)   LMP  (LMP Unknown) Comment: BEAU/ NO BSO  1986  BMI 21.80 kg/m²     ROS:   CONSTITUTIONAL: Appetite low, +  fatigue  OTHER: pain improving to incisional site       PHYSICAL EXAM:  GENERAL: Well developed, well nourished. No acute distress.   PSYCH: AAOx3, appropriate mood and affect, conversant, very well-groomed. Judgement and insight good.   NEURO: Cranial nerves grossly intact. Speech clear, no tremor.   CHEST: Respirations even and unlabored.          Hemoglobin A1C   Date Value Ref Range Status   07/11/2023 5.3 4.0 - 5.6 % Final     Comment:     ADA Screening Guidelines:  5.7-6.4%  Consistent with prediabetes  >or=6.5%  Consistent with diabetes    High levels of fetal hemoglobin interfere with the HbA1C  assay. Heterozygous hemoglobin variants (HbS, HgC, etc)do  not significantly interfere with this assay.   However, presence of multiple variants  may affect accuracy.     11/01/2022 5.8 (H) 4.0 - 5.6 % Final     Comment:     ADA Screening Guidelines:  5.7-6.4%  Consistent with prediabetes  >or=6.5%  Consistent with diabetes    High levels of fetal hemoglobin interfere with the HbA1C  assay. Heterozygous hemoglobin variants (HbS, HgC, etc)do  not significantly interfere with this assay.   However, presence of multiple variants may affect accuracy.     04/22/2022 6.0 (H) 4.0 - 5.6 % Final     Comment:     ADA Screening Guidelines:  5.7-6.4%  Consistent with prediabetes  >or=6.5%  Consistent with diabetes    High levels of fetal hemoglobin interfere with the HbA1C  assay. Heterozygous hemoglobin variants (HbS, HgC, etc)do  not significantly interfere with this assay.   However, presence of multiple variants may affect accuracy.     10/23/2019 6.8 % Final       Lab Results   Component Value Date    CPEPTIDE 5.63 (H) 07/11/2023    GLUTAMICACID 0.00 07/11/2023    ISLETCELLANT <1:4 07/11/2023        Lab Results   Component Value Date    CHOL 149 11/01/2022    CHOL 138 04/22/2022    CHOL 194 10/19/2021     Lab Results   Component Value Date    HDL 54 11/01/2022    HDL 52 04/22/2022    HDL 58 10/19/2021     Lab Results   Component Value Date    LDLCALC 77.0 11/01/2022    LDLCALC 64.4 04/22/2022    LDLCALC 104.0 10/19/2021     Lab Results   Component Value Date    TRIG 96 01/14/2023    TRIG 90 11/01/2022    TRIG 108 04/22/2022     Lab Results   Component Value Date    CHOLHDL 36.2 11/01/2022    CHOLHDL 37.7 04/22/2022    CHOLHDL 29.9 10/19/2021         Component Value Date/Time     (L) 03/06/2023 0556    K 6.2 (H) 03/06/2023 0556    CL 91 (L) 03/06/2023 0556    CO2 16 (L) 03/06/2023 0556     (H) 03/06/2023 0556    CREATININE 7.8 (H) 03/06/2023 0556    GLU 98 03/06/2023 0556    CALCIUM 10.2 03/06/2023 0556    ALKPHOS 136 (H) 03/06/2023 0556    AST 17 03/06/2023 0556    ALT 14 03/06/2023 0556    BILITOT 0.5 03/06/2023 0556    EGFRNORACEVR 5.1 (A) 03/06/2023  0556    ESTGFRAFRICA >60.0 04/22/2022 0910           Lab Results   Component Value Date    LABMICR <5.0 11/01/2022    CREATRANDUR 41.0 11/01/2022    MICALBCREAT Unable to calculate 11/01/2022             ASSESSMENT and PLAN:    A1C GOAL: n/a d/t anemia     1. Type 2 diabetes mellitus with other kidney complication, unspecified whether long term insulin use  No Lantus at this time.   Test glucose 1-2x/day. Pre-meal glucoses should be less than 150. Post-meal glucoses should be less than 200.     If premeal glucoses are higher than 150, use Novolog (orange pen) as needed with correction scale:   Blood sugar 150 to 200, + 1 unit  Blood sugar 201 to 250, + 2 units  Blood sugar 251 to 300, + 3 units  Blood sugar 301 to 350, + 4 units   Blood sugar greater than 350, + 5 units    If glucoses are consistently high, contact office and have glucose log ready to send in.     Return to clinic in 4-6 months. Bring written logs to every visit.     insulin aspart U-100 (NOVOLOG) 100 unit/mL (3 mL) InPn pen      2. ESRD (end stage renal disease) on dialysis  Avoid hypoglycemia.             No orders of the defined types were placed in this encounter.       Follow up in about 4 months (around 12/21/2023).

## 2023-09-15 ENCOUNTER — PATIENT MESSAGE (OUTPATIENT)
Dept: PULMONOLOGY | Facility: CLINIC | Age: 73
End: 2023-09-15
Payer: MEDICARE

## 2023-09-27 ENCOUNTER — OFFICE VISIT (OUTPATIENT)
Dept: UROLOGY | Facility: CLINIC | Age: 73
End: 2023-09-27
Payer: MEDICARE

## 2023-09-27 VITALS
WEIGHT: 130.94 LBS | HEART RATE: 73 BPM | DIASTOLIC BLOOD PRESSURE: 79 MMHG | HEIGHT: 65 IN | BODY MASS INDEX: 21.81 KG/M2 | SYSTOLIC BLOOD PRESSURE: 127 MMHG

## 2023-09-27 DIAGNOSIS — R30.0 DYSURIA: ICD-10-CM

## 2023-09-27 DIAGNOSIS — N20.0 LEFT NEPHROLITHIASIS: Primary | ICD-10-CM

## 2023-09-27 DIAGNOSIS — Z99.2 DIALYSIS PATIENT: ICD-10-CM

## 2023-09-27 LAB
BACTERIA #/AREA URNS AUTO: NORMAL /HPF
BILIRUB UR QL STRIP: NEGATIVE
CLARITY UR REFRACT.AUTO: CLEAR
COLOR UR AUTO: YELLOW
GLUCOSE UR QL STRIP: NEGATIVE
HGB UR QL STRIP: NEGATIVE
HYALINE CASTS UR QL AUTO: 0 /LPF
KETONES UR QL STRIP: NEGATIVE
LEUKOCYTE ESTERASE UR QL STRIP: NEGATIVE
MICROSCOPIC COMMENT: NORMAL
NITRITE UR QL STRIP: NEGATIVE
PH UR STRIP: >8 [PH] (ref 5–8)
PROT UR QL STRIP: ABNORMAL
RBC #/AREA URNS AUTO: 1 /HPF (ref 0–4)
SP GR UR STRIP: 1.01 (ref 1–1.03)
URN SPEC COLLECT METH UR: ABNORMAL
WBC #/AREA URNS AUTO: 0 /HPF (ref 0–5)

## 2023-09-27 PROCEDURE — 3078F DIAST BP <80 MM HG: CPT | Mod: CPTII,S$GLB,, | Performed by: NURSE PRACTITIONER

## 2023-09-27 PROCEDURE — 1126F AMNT PAIN NOTED NONE PRSNT: CPT | Mod: CPTII,S$GLB,, | Performed by: NURSE PRACTITIONER

## 2023-09-27 PROCEDURE — 1159F PR MEDICATION LIST DOCUMENTED IN MEDICAL RECORD: ICD-10-PCS | Mod: CPTII,S$GLB,, | Performed by: NURSE PRACTITIONER

## 2023-09-27 PROCEDURE — 99214 PR OFFICE/OUTPT VISIT, EST, LEVL IV, 30-39 MIN: ICD-10-PCS | Mod: S$GLB,,, | Performed by: NURSE PRACTITIONER

## 2023-09-27 PROCEDURE — 1159F MED LIST DOCD IN RCRD: CPT | Mod: CPTII,S$GLB,, | Performed by: NURSE PRACTITIONER

## 2023-09-27 PROCEDURE — 3288F PR FALLS RISK ASSESSMENT DOCUMENTED: ICD-10-PCS | Mod: CPTII,S$GLB,, | Performed by: NURSE PRACTITIONER

## 2023-09-27 PROCEDURE — 3066F NEPHROPATHY DOC TX: CPT | Mod: CPTII,S$GLB,, | Performed by: NURSE PRACTITIONER

## 2023-09-27 PROCEDURE — 1126F PR PAIN SEVERITY QUANTIFIED, NO PAIN PRESENT: ICD-10-PCS | Mod: CPTII,S$GLB,, | Performed by: NURSE PRACTITIONER

## 2023-09-27 PROCEDURE — 3044F HG A1C LEVEL LT 7.0%: CPT | Mod: CPTII,S$GLB,, | Performed by: NURSE PRACTITIONER

## 2023-09-27 PROCEDURE — 1160F RVW MEDS BY RX/DR IN RCRD: CPT | Mod: CPTII,S$GLB,, | Performed by: NURSE PRACTITIONER

## 2023-09-27 PROCEDURE — 1100F PTFALLS ASSESS-DOCD GE2>/YR: CPT | Mod: CPTII,S$GLB,, | Performed by: NURSE PRACTITIONER

## 2023-09-27 PROCEDURE — 1160F PR REVIEW ALL MEDS BY PRESCRIBER/CLIN PHARMACIST DOCUMENTED: ICD-10-PCS | Mod: CPTII,S$GLB,, | Performed by: NURSE PRACTITIONER

## 2023-09-27 PROCEDURE — 81001 URINALYSIS AUTO W/SCOPE: CPT | Performed by: NURSE PRACTITIONER

## 2023-09-27 PROCEDURE — 3078F PR MOST RECENT DIASTOLIC BLOOD PRESSURE < 80 MM HG: ICD-10-PCS | Mod: CPTII,S$GLB,, | Performed by: NURSE PRACTITIONER

## 2023-09-27 PROCEDURE — 87086 URINE CULTURE/COLONY COUNT: CPT | Performed by: NURSE PRACTITIONER

## 2023-09-27 PROCEDURE — 3008F BODY MASS INDEX DOCD: CPT | Mod: CPTII,S$GLB,, | Performed by: NURSE PRACTITIONER

## 2023-09-27 PROCEDURE — 99214 OFFICE O/P EST MOD 30 MIN: CPT | Mod: S$GLB,,, | Performed by: NURSE PRACTITIONER

## 2023-09-27 PROCEDURE — 3008F PR BODY MASS INDEX (BMI) DOCUMENTED: ICD-10-PCS | Mod: CPTII,S$GLB,, | Performed by: NURSE PRACTITIONER

## 2023-09-27 PROCEDURE — 3288F FALL RISK ASSESSMENT DOCD: CPT | Mod: CPTII,S$GLB,, | Performed by: NURSE PRACTITIONER

## 2023-09-27 PROCEDURE — 3074F PR MOST RECENT SYSTOLIC BLOOD PRESSURE < 130 MM HG: ICD-10-PCS | Mod: CPTII,S$GLB,, | Performed by: NURSE PRACTITIONER

## 2023-09-27 PROCEDURE — 3074F SYST BP LT 130 MM HG: CPT | Mod: CPTII,S$GLB,, | Performed by: NURSE PRACTITIONER

## 2023-09-27 PROCEDURE — 3044F PR MOST RECENT HEMOGLOBIN A1C LEVEL <7.0%: ICD-10-PCS | Mod: CPTII,S$GLB,, | Performed by: NURSE PRACTITIONER

## 2023-09-27 PROCEDURE — 3066F PR DOCUMENTATION OF TREATMENT FOR NEPHROPATHY: ICD-10-PCS | Mod: CPTII,S$GLB,, | Performed by: NURSE PRACTITIONER

## 2023-09-27 PROCEDURE — 99999 PR PBB SHADOW E&M-EST. PATIENT-LVL V: ICD-10-PCS | Mod: PBBFAC,,, | Performed by: NURSE PRACTITIONER

## 2023-09-27 PROCEDURE — 99999 PR PBB SHADOW E&M-EST. PATIENT-LVL V: CPT | Mod: PBBFAC,,, | Performed by: NURSE PRACTITIONER

## 2023-09-27 PROCEDURE — 1100F PR PT FALLS ASSESS DOC 2+ FALLS/FALL W/INJURY/YR: ICD-10-PCS | Mod: CPTII,S$GLB,, | Performed by: NURSE PRACTITIONER

## 2023-09-27 NOTE — PROGRESS NOTES
Subjective:       Patient ID: Lily Green is a 73 y.o. female.    Chief Complaint: Follow-up    Patient is here today for a 8 week follow-up for left renal stone and UTI. Patient is a T/Th/Sat dialysis patient. She reports everything is going well except she has intermittent bladder pain with voiding sometimes.     Follow-up  Chronicity: left renal stone and UTI. The current episode started more than 1 month ago. The problem has been gradually improving. Associated symptoms include abdominal pain (intermittent; with voiding) and urinary symptoms. Pertinent negatives include no anorexia, change in bowel habit, chills, fatigue, fever, headaches, nausea, swollen glands, vomiting or weakness. Nothing aggravates the symptoms. Treatments tried: antibiotic for UTI. The treatment provided significant relief.     Review of Systems   Constitutional:  Negative for chills, fatigue and fever.   Gastrointestinal:  Positive for abdominal pain (intermittent; with voiding). Negative for anorexia, change in bowel habit, constipation, diarrhea, nausea and vomiting.   Genitourinary:  Negative for decreased urine volume, difficulty urinating, dysuria, flank pain, frequency, hematuria, nocturia, pelvic pain, urgency, vaginal bleeding, vaginal discharge and vaginal pain.   Neurological:  Negative for dizziness, weakness and headaches.   Psychiatric/Behavioral: Negative.           Objective:      Physical Exam  Vitals and nursing note reviewed.   Constitutional:       General: She is not in acute distress.     Appearance: She is well-developed. She is not ill-appearing.   HENT:      Head: Normocephalic and atraumatic.   Eyes:      Pupils: Pupils are equal, round, and reactive to light.   Cardiovascular:      Rate and Rhythm: Normal rate.   Pulmonary:      Effort: Pulmonary effort is normal. No respiratory distress.   Abdominal:      Palpations: Abdomen is soft.      Tenderness: There is no abdominal tenderness.    Musculoskeletal:         General: Normal range of motion.      Cervical back: Normal range of motion.   Skin:     General: Skin is warm and dry.   Neurological:      Mental Status: She is alert and oriented to person, place, and time.      Coordination: Coordination normal.   Psychiatric:         Mood and Affect: Mood normal.         Behavior: Behavior normal.         Thought Content: Thought content normal.         Judgment: Judgment normal.         Assessment:       Problem List Items Addressed This Visit    None  Visit Diagnoses       Left nephrolithiasis    -  Primary    Relevant Orders    US Retroperitoneal Complete    Urine culture    Urinalysis    Dialysis patient        Relevant Orders    Urine culture    Urinalysis    Dysuria        Relevant Orders    Urine culture    Urinalysis            Plan:           Lily Kline was seen today for follow-up.    Diagnoses and all orders for this visit:    Left nephrolithiasis  -     US Retroperitoneal Complete; Future  -     Urine culture  -     Urinalysis    Dialysis patient  -     Urine culture  -     Urinalysis    Dysuria  -     Urine culture  -     Urinalysis    Follow-up pending urine cx and U/S results.    Noe Guevara NP

## 2023-09-27 NOTE — PATIENT INSTRUCTIONS
U/A and urine cx  U/S retroperitoneal complete (next available)  Follow-up pending urine cx and U/S results.

## 2023-09-28 LAB — BACTERIA UR CULT: NO GROWTH

## 2023-09-29 ENCOUNTER — TELEPHONE (OUTPATIENT)
Dept: UROLOGY | Facility: CLINIC | Age: 73
End: 2023-09-29
Payer: MEDICARE

## 2023-09-29 PROBLEM — R53.1 WEAKNESS: Status: ACTIVE | Noted: 2023-09-29

## 2023-09-29 NOTE — TELEPHONE ENCOUNTER
----- Message from Noe Guevara NP sent at 9/28/2023  8:55 PM CDT -----  Please inform patient via telephone that her urine cx was normal. Her UTI has resolved.

## 2023-09-29 NOTE — TELEPHONE ENCOUNTER
----- Message from Noe Guevara NP sent at 9/28/2023  9:02 PM CDT -----  Please inform patient via telephone that her U/S still showed stable non-obstructing 9 mm left renal stone. Right renal lithotripsy is recommended. However, since patient is not having any pain we can continue to monitor stone. Please make patient aware that at any point the stone can decide to move and become obstructive. The stone can also continue to grow if patient chose to monitor. Let me know what patient decides. Thanks.

## 2023-09-29 NOTE — TELEPHONE ENCOUNTER
Spoke to patient and results given, she states she will think about doing procedure and will call us back if she decides to move forward

## 2023-10-02 RX ORDER — PEN NEEDLE, DIABETIC 30 GX3/16"
1 NEEDLE, DISPOSABLE MISCELLANEOUS
Qty: 400 EACH | Refills: 3 | Status: SHIPPED | OUTPATIENT
Start: 2023-10-02

## 2023-10-02 RX ORDER — PEN NEEDLE, DIABETIC 30 GX3/16"
1 NEEDLE, DISPOSABLE MISCELLANEOUS
Qty: 150 EACH | Refills: 11 | Status: SHIPPED | OUTPATIENT
Start: 2023-10-02 | End: 2023-10-02 | Stop reason: SDUPTHER

## 2023-10-02 NOTE — TELEPHONE ENCOUNTER
"----- Message from Eduardo Rogel sent at 10/2/2023 11:45 AM CDT -----  Type: RX Refill Request    Who Called: Self     Have you contacted your pharmacy:Yes     Refill or New Rx:REFILL     RX Name and Strength: pen needle, diabetic (BD ULTRA-FINE MARIS PEN NEEDLE) 32 gauge x 5/32" Ndle 150 each     Preferred Pharmacy with phone number:[id number] - [place], [place] - [address] [facility] [address]   Phone:  [phone]  Fax:  [phone]    Local or Mail Order:Local     Would the patient rather a call back or a response via [facility]? Call     Best Call Back Number: [phone] (home)       "

## 2023-10-05 ENCOUNTER — TELEPHONE (OUTPATIENT)
Dept: PODIATRY | Facility: CLINIC | Age: 73
End: 2023-10-05
Payer: MEDICARE

## 2023-10-05 NOTE — TELEPHONE ENCOUNTER
----- Message from Melissa Choudhary sent at 10/5/2023  3:37 PM CDT -----  Contact: pt  Type: Needs Medical Advice  Who Called:  pt  Best Call Back Number: [phone]    Additional Information: Pt is calling the office needs to speak with nurse regarding tomorrow appt.Please call back and advise.

## 2023-10-09 ENCOUNTER — TELEPHONE (OUTPATIENT)
Dept: TRANSPLANT | Facility: CLINIC | Age: 73
End: 2023-10-09
Payer: MEDICARE

## 2023-10-09 ENCOUNTER — HOSPITAL ENCOUNTER (OUTPATIENT)
Dept: RADIOLOGY | Facility: HOSPITAL | Age: 73
Discharge: HOME | End: 2023-10-09
Attending: INTERNAL MEDICINE
Payer: MEDICARE

## 2023-10-09 DIAGNOSIS — R91.1 NODULE OF RIGHT LUNG: ICD-10-CM

## 2023-10-09 LAB — POCT GLUCOSE: 104 MG/DL (ref 70–110)

## 2023-10-09 PROCEDURE — A9552 F18 FDG: HCPCS | Mod: TXP

## 2023-10-09 PROCEDURE — 78815 PET IMAGE W/CT SKULL-THIGH: CPT | Mod: 26,PI,NTX, | Performed by: STUDENT IN AN ORGANIZED HEALTH CARE EDUCATION/TRAINING PROGRAM

## 2023-10-09 PROCEDURE — 78815 NM PET CT ROUTINE: ICD-10-PCS | Mod: 26,PI,NTX, | Performed by: STUDENT IN AN ORGANIZED HEALTH CARE EDUCATION/TRAINING PROGRAM

## 2023-10-10 ENCOUNTER — TELEPHONE (OUTPATIENT)
Dept: PULMONOLOGY | Facility: HOSPITAL | Age: 73
End: 2023-10-10
Payer: MEDICARE

## 2023-10-10 DIAGNOSIS — R91.1 SOLITARY PULMONARY NODULE: Primary | ICD-10-CM

## 2023-10-10 DIAGNOSIS — R91.1 NODULE OF RIGHT LUNG: ICD-10-CM

## 2023-10-10 NOTE — TELEPHONE ENCOUNTER
Patient with persistent right lung nocule, 2 cm, SUV of 1.  Has HD sessions every T-Th-Sat.  Can schedule robotic bronchoscopy 10/20.  Will need CT of chest for navigaion protocol prior.  Instructed on arrival.  Will call to verify instructions that were given.

## 2023-10-16 ENCOUNTER — TELEPHONE (OUTPATIENT)
Dept: PULMONOLOGY | Facility: CLINIC | Age: 73
End: 2023-10-16
Payer: MEDICARE

## 2023-10-16 NOTE — TELEPHONE ENCOUNTER
----- Message from Michelle Maldonado sent at 10/16/2023  1:18 PM CDT -----  Type:  Needs Medical Advice    Who Called: pt  Would the patient rather a call back or a response via [facility]? call  Best Call Back Number: [phone]  Additional Information: pt schedule CT scan for [place] but they don't do it needs orders again go schedule for [facility]

## 2023-10-16 NOTE — TELEPHONE ENCOUNTER
----- Message from Alba Steward sent at 10/16/2023  7:33 AM CDT -----  Type:  Needs a appointment change    Who Called: pt     Would the patient rather a call back or a response via [facility]? call  Best Call Back Number: [phone]  Additional Information: pt is scheduled for a CT scan on 10/17/23 pt needs to get it rescheduled for a Monday,Wednesday are Friday only

## 2023-10-16 NOTE — TELEPHONE ENCOUNTER
I spoke with Ms Kline in regards to rescheduling her ct. Patient stated she would to get her ct done at [facility]. Appointment made. Patient confirmed and verbalized understanding.

## 2023-10-16 NOTE — TELEPHONE ENCOUNTER
I spoke with patient to scheduled ct scan on 10/18/23 at 3:15pm at our imaging center prior to robotic bronchoscopy on 10/20/23 with Dr Monzon. Patient confirmed and verbalized understanding.

## 2023-10-18 ENCOUNTER — HOSPITAL ENCOUNTER (OUTPATIENT)
Dept: RADIOLOGY | Facility: HOSPITAL | Age: 73
Discharge: HOME | End: 2023-10-18
Attending: INTERNAL MEDICINE
Payer: MEDICARE

## 2023-10-18 PROCEDURE — 71250 CT THORAX DX C-: CPT | Mod: TC,NTX

## 2023-10-18 PROCEDURE — 71250 CT CHEST WITHOUT CONTRAST: ICD-10-PCS | Mod: 26,,, | Performed by: STUDENT IN AN ORGANIZED HEALTH CARE EDUCATION/TRAINING PROGRAM

## 2023-10-18 PROCEDURE — 71250 CT THORAX DX C-: CPT | Mod: 26,,, | Performed by: STUDENT IN AN ORGANIZED HEALTH CARE EDUCATION/TRAINING PROGRAM

## 2023-10-19 ENCOUNTER — TELEPHONE (OUTPATIENT)
Dept: PULMONOLOGY | Facility: CLINIC | Age: 73
End: 2023-10-19
Payer: MEDICARE

## 2023-10-19 NOTE — TELEPHONE ENCOUNTER
----- Message from Filomena Rowe sent at 10/19/2023  2:24 PM CDT -----  Regarding: pt adivce  Contact: [phone]  Pt needing to know can see eat breakfast before procedure schedule 10/20/23. Allen preston

## 2023-10-19 NOTE — TELEPHONE ENCOUNTER
Spoke with patient, informed her that I have received her message. I advised pt that she can not eat before her procedure. Pt verbalized that she understand.

## 2023-10-20 ENCOUNTER — HOSPITAL ENCOUNTER (OUTPATIENT)
Facility: HOSPITAL | Age: 73
Discharge: HOME | End: 2023-10-20
Attending: INTERNAL MEDICINE | Admitting: INTERNAL MEDICINE
Payer: MEDICARE

## 2023-10-20 ENCOUNTER — ANESTHESIA (OUTPATIENT)
Dept: SURGERY | Facility: HOSPITAL | Age: 73
End: 2023-10-20
Payer: MEDICARE

## 2023-10-20 ENCOUNTER — ANESTHESIA EVENT (OUTPATIENT)
Dept: SURGERY | Facility: HOSPITAL | Age: 73
End: 2023-10-20
Payer: MEDICARE

## 2023-10-20 VITALS
SYSTOLIC BLOOD PRESSURE: 120 MMHG | WEIGHT: 130 LBS | RESPIRATION RATE: 9 BRPM | OXYGEN SATURATION: 95 % | DIASTOLIC BLOOD PRESSURE: 60 MMHG | TEMPERATURE: 98 F | HEIGHT: 65 IN | BODY MASS INDEX: 21.66 KG/M2 | HEART RATE: 73 BPM

## 2023-10-20 DIAGNOSIS — R91.1 LUNG NODULE, SOLITARY: ICD-10-CM

## 2023-10-20 DIAGNOSIS — R91.1 PULMONARY NODULE: Primary | ICD-10-CM

## 2023-10-20 DIAGNOSIS — R91.1 LUNG NODULE: ICD-10-CM

## 2023-10-20 LAB
GRAM STN SPEC: NORMAL
GRAM STN SPEC: NORMAL
POCT GLUCOSE: 94 MG/DL (ref 70–110)

## 2023-10-20 PROCEDURE — 87205 SMEAR GRAM STAIN: CPT | Mod: TXP | Performed by: INTERNAL MEDICINE

## 2023-10-20 PROCEDURE — 31629 BRONCHOSCOPY/NEEDLE BX EACH: CPT | Mod: RT,,, | Performed by: INTERNAL MEDICINE

## 2023-10-20 PROCEDURE — 31628 PR BRONCHOSCOPY,TRANSBRONCH BIOPSY: ICD-10-PCS | Mod: 51,RT,, | Performed by: INTERNAL MEDICINE

## 2023-10-20 PROCEDURE — 87070 CULTURE OTHR SPECIMN AEROBIC: CPT | Mod: NTX | Performed by: INTERNAL MEDICINE

## 2023-10-20 PROCEDURE — D9220A PRA ANESTHESIA: ICD-10-PCS | Mod: CRNA,NTX,, | Performed by: NURSE ANESTHETIST, CERTIFIED REGISTERED

## 2023-10-20 PROCEDURE — 71000015 HC POSTOP RECOV 1ST HR: Performed by: INTERNAL MEDICINE

## 2023-10-20 PROCEDURE — 88173 PR  INTERPRETATION OF FNA SMEAR: ICD-10-PCS | Mod: 26,,, | Performed by: PATHOLOGY

## 2023-10-20 PROCEDURE — 88342 CHG IMMUNOCYTOCHEMISTRY: ICD-10-PCS | Mod: 26,,, | Performed by: PATHOLOGY

## 2023-10-20 PROCEDURE — 31627 NAVIGATIONAL BRONCHOSCOPY: CPT | Mod: ,,, | Performed by: INTERNAL MEDICINE

## 2023-10-20 PROCEDURE — 27201423 OPTIME MED/SURG SUP & DEVICES STERILE SUPPLY: Mod: NTX | Performed by: INTERNAL MEDICINE

## 2023-10-20 PROCEDURE — 88342 IMHCHEM/IMCYTCHM 1ST ANTB: CPT | Mod: 26,,, | Performed by: PATHOLOGY

## 2023-10-20 PROCEDURE — 36000709 HC OR TIME LEV III EA ADD 15 MIN: Performed by: INTERNAL MEDICINE

## 2023-10-20 PROCEDURE — 88112 CYTOPATH CELL ENHANCE TECH: CPT | Mod: 26,59,, | Performed by: PATHOLOGY

## 2023-10-20 PROCEDURE — D9220A PRA ANESTHESIA: Mod: ANES,NTX,, | Performed by: SURGERY

## 2023-10-20 PROCEDURE — 31624 PR BRONCHOSCOPY,DIAG2STIC W LAVAGE: ICD-10-PCS | Mod: 51,RT,, | Performed by: INTERNAL MEDICINE

## 2023-10-20 PROCEDURE — 88342 IMHCHEM/IMCYTCHM 1ST ANTB: CPT | Mod: 59 | Performed by: PATHOLOGY

## 2023-10-20 PROCEDURE — D9220A PRA ANESTHESIA: ICD-10-PCS | Mod: ANES,NTX,, | Performed by: SURGERY

## 2023-10-20 PROCEDURE — 88341 IMHCHEM/IMCYTCHM EA ADD ANTB: CPT | Performed by: PATHOLOGY

## 2023-10-20 PROCEDURE — C1726 CATH, BAL DIL, NON-VASCULAR: HCPCS | Mod: NTX | Performed by: INTERNAL MEDICINE

## 2023-10-20 PROCEDURE — 63600175 PHARM REV CODE 636 W HCPCS: Mod: TXP | Performed by: NURSE ANESTHETIST, CERTIFIED REGISTERED

## 2023-10-20 PROCEDURE — 88173 CYTOPATH EVAL FNA REPORT: CPT | Performed by: PATHOLOGY

## 2023-10-20 PROCEDURE — 88112 CYTOPATH CELL ENHANCE TECH: CPT | Mod: 59 | Performed by: PATHOLOGY

## 2023-10-20 PROCEDURE — D9220A PRA ANESTHESIA: Mod: CRNA,NTX,, | Performed by: NURSE ANESTHETIST, CERTIFIED REGISTERED

## 2023-10-20 PROCEDURE — 36000708 HC OR TIME LEV III 1ST 15 MIN: Performed by: INTERNAL MEDICINE

## 2023-10-20 PROCEDURE — 31628 BRONCHOSCOPY/LUNG BX EACH: CPT | Mod: 51,RT,, | Performed by: INTERNAL MEDICINE

## 2023-10-20 PROCEDURE — 71000044 HC DOSC ROUTINE RECOVERY FIRST HOUR: Performed by: INTERNAL MEDICINE

## 2023-10-20 PROCEDURE — 88305 TISSUE EXAM BY PATHOLOGIST: ICD-10-PCS | Mod: 26,,, | Performed by: PATHOLOGY

## 2023-10-20 PROCEDURE — 37000008 HC ANESTHESIA 1ST 15 MINUTES: Performed by: INTERNAL MEDICINE

## 2023-10-20 PROCEDURE — 37000009 HC ANESTHESIA EA ADD 15 MINS: Performed by: INTERNAL MEDICINE

## 2023-10-20 PROCEDURE — 87116 MYCOBACTERIA CULTURE: CPT | Mod: TXP | Performed by: INTERNAL MEDICINE

## 2023-10-20 PROCEDURE — 31627 PR BRONCHOSCOPY,COMPUTER ASSIST/IMAGE-GUIDED NAVIGATION: ICD-10-PCS | Mod: ,,, | Performed by: INTERNAL MEDICINE

## 2023-10-20 PROCEDURE — 31654 BRONCH EBUS IVNTJ PERPH LES: CPT | Mod: ,,, | Performed by: INTERNAL MEDICINE

## 2023-10-20 PROCEDURE — 88341 PR IHC OR ICC EACH ADD'L SINGLE ANTIBODY  STAINPR: ICD-10-PCS | Mod: 26,,, | Performed by: PATHOLOGY

## 2023-10-20 PROCEDURE — 25000003 PHARM REV CODE 250: Mod: TXP | Performed by: NURSE ANESTHETIST, CERTIFIED REGISTERED

## 2023-10-20 PROCEDURE — 88112 PR  CYTOPATH, CELL ENHANCE TECH: ICD-10-PCS | Mod: 26,59,, | Performed by: PATHOLOGY

## 2023-10-20 PROCEDURE — 87102 FUNGUS ISOLATION CULTURE: CPT | Mod: TXP | Performed by: INTERNAL MEDICINE

## 2023-10-20 PROCEDURE — 87075 CULTR BACTERIA EXCEPT BLOOD: CPT | Mod: NTX | Performed by: INTERNAL MEDICINE

## 2023-10-20 PROCEDURE — 88305 TISSUE EXAM BY PATHOLOGIST: CPT | Mod: 26,,, | Performed by: PATHOLOGY

## 2023-10-20 PROCEDURE — 88172 CYTP DX EVAL FNA 1ST EA SITE: CPT | Performed by: PATHOLOGY

## 2023-10-20 PROCEDURE — 87015 SPECIMEN INFECT AGNT CONCNTJ: CPT | Mod: TXP | Performed by: INTERNAL MEDICINE

## 2023-10-20 PROCEDURE — 82962 GLUCOSE BLOOD TEST: CPT | Performed by: INTERNAL MEDICINE

## 2023-10-20 PROCEDURE — 88341 IMHCHEM/IMCYTCHM EA ADD ANTB: CPT | Mod: 26,,, | Performed by: PATHOLOGY

## 2023-10-20 PROCEDURE — 88177 CYTP FNA EVAL EA ADDL: CPT | Performed by: PATHOLOGY

## 2023-10-20 PROCEDURE — 31654 PR BRONCH W/ EBUS, DIAG OR THERA INTERVENTION PERIPHERAL LESION(S), INCL GUID, ADD ON CODE: ICD-10-PCS | Mod: ,,, | Performed by: INTERNAL MEDICINE

## 2023-10-20 PROCEDURE — 31624 DX BRONCHOSCOPE/LAVAGE: CPT | Mod: 51,RT,, | Performed by: INTERNAL MEDICINE

## 2023-10-20 PROCEDURE — 88305 TISSUE EXAM BY PATHOLOGIST: CPT | Mod: 59 | Performed by: PATHOLOGY

## 2023-10-20 PROCEDURE — 88173 CYTOPATH EVAL FNA REPORT: CPT | Mod: 26,,, | Performed by: PATHOLOGY

## 2023-10-20 PROCEDURE — 31629 PR BRONCHOSCOPY,TRANSBRON ASPIR BX: ICD-10-PCS | Mod: RT,,, | Performed by: INTERNAL MEDICINE

## 2023-10-20 RX ORDER — ROCURONIUM BROMIDE 10 MG/ML
INJECTION, SOLUTION INTRAVENOUS
Status: DISCONTINUED | OUTPATIENT
Start: 2023-10-20 | End: 2023-10-20

## 2023-10-20 RX ORDER — DEXAMETHASONE SODIUM PHOSPHATE 4 MG/ML
INJECTION, SOLUTION INTRA-ARTICULAR; INTRALESIONAL; INTRAMUSCULAR; INTRAVENOUS; SOFT TISSUE
Status: DISCONTINUED | OUTPATIENT
Start: 2023-10-20 | End: 2023-10-20

## 2023-10-20 RX ORDER — LIDOCAINE HYDROCHLORIDE 20 MG/ML
INJECTION, SOLUTION EPIDURAL; INFILTRATION; INTRACAUDAL; PERINEURAL
Status: DISCONTINUED | OUTPATIENT
Start: 2023-10-20 | End: 2023-10-20

## 2023-10-20 RX ORDER — FENTANYL CITRATE 50 UG/ML
INJECTION, SOLUTION INTRAMUSCULAR; INTRAVENOUS
Status: DISCONTINUED | OUTPATIENT
Start: 2023-10-20 | End: 2023-10-20

## 2023-10-20 RX ORDER — ONDANSETRON 2 MG/ML
INJECTION INTRAMUSCULAR; INTRAVENOUS
Status: DISCONTINUED | OUTPATIENT
Start: 2023-10-20 | End: 2023-10-20

## 2023-10-20 RX ORDER — SODIUM CHLORIDE 0.9 % (FLUSH) 0.9 %
10 SYRINGE (ML) INJECTION
Status: DISCONTINUED | OUTPATIENT
Start: 2023-10-20 | End: 2023-10-20 | Stop reason: HOSPADM

## 2023-10-20 RX ORDER — MIDAZOLAM HYDROCHLORIDE 1 MG/ML
INJECTION, SOLUTION INTRAMUSCULAR; INTRAVENOUS
Status: DISCONTINUED | OUTPATIENT
Start: 2023-10-20 | End: 2023-10-20

## 2023-10-20 RX ORDER — PROPOFOL 10 MG/ML
VIAL (ML) INTRAVENOUS CONTINUOUS PRN
Status: DISCONTINUED | OUTPATIENT
Start: 2023-10-20 | End: 2023-10-20

## 2023-10-20 RX ORDER — FENTANYL CITRATE 50 UG/ML
25 INJECTION, SOLUTION INTRAMUSCULAR; INTRAVENOUS EVERY 5 MIN PRN
Status: DISCONTINUED | OUTPATIENT
Start: 2023-10-20 | End: 2023-10-20 | Stop reason: HOSPADM

## 2023-10-20 RX ADMIN — SODIUM CHLORIDE 0.5 MCG/KG/MIN: 9 INJECTION, SOLUTION INTRAVENOUS at 11:10

## 2023-10-20 RX ADMIN — ROCURONIUM BROMIDE 50 MG: 10 INJECTION INTRAVENOUS at 11:10

## 2023-10-20 RX ADMIN — PROPOFOL 130 MG: 10 INJECTION, EMULSION INTRAVENOUS at 11:10

## 2023-10-20 RX ADMIN — SODIUM CHLORIDE: 0.9 INJECTION, SOLUTION INTRAVENOUS at 11:10

## 2023-10-20 RX ADMIN — SUGAMMADEX 200 MG: 100 INJECTION, SOLUTION INTRAVENOUS at 12:10

## 2023-10-20 RX ADMIN — PROPOFOL 125 MCG/KG/MIN: 10 INJECTION, EMULSION INTRAVENOUS at 11:10

## 2023-10-20 RX ADMIN — ROCURONIUM BROMIDE 30 MG: 10 INJECTION INTRAVENOUS at 11:10

## 2023-10-20 RX ADMIN — ROCURONIUM BROMIDE 20 MG: 10 INJECTION INTRAVENOUS at 12:10

## 2023-10-20 RX ADMIN — FENTANYL CITRATE 25 MCG: 50 INJECTION INTRAMUSCULAR; INTRAVENOUS at 11:10

## 2023-10-20 RX ADMIN — SUGAMMADEX 100 MG: 100 INJECTION, SOLUTION INTRAVENOUS at 12:10

## 2023-10-20 RX ADMIN — LIDOCAINE HYDROCHLORIDE 100 MG: 20 INJECTION, SOLUTION EPIDURAL; INFILTRATION; INTRACAUDAL at 11:10

## 2023-10-20 RX ADMIN — DEXAMETHASONE SODIUM PHOSPHATE 4 MG: 4 INJECTION INTRA-ARTICULAR; INTRALESIONAL; INTRAMUSCULAR; INTRAVENOUS; SOFT TISSUE at 11:10

## 2023-10-20 RX ADMIN — MIDAZOLAM HYDROCHLORIDE 2 MG: 1 INJECTION, SOLUTION INTRAMUSCULAR; INTRAVENOUS at 11:10

## 2023-10-20 RX ADMIN — FENTANYL CITRATE 50 MCG: 50 INJECTION INTRAMUSCULAR; INTRAVENOUS at 11:10

## 2023-10-20 RX ADMIN — ONDANSETRON 4 MG: 2 INJECTION INTRAMUSCULAR; INTRAVENOUS at 12:10

## 2023-10-20 RX ADMIN — FENTANYL CITRATE 25 MCG: 50 INJECTION INTRAMUSCULAR; INTRAVENOUS at 12:10

## 2023-10-20 NOTE — ANESTHESIA PREPROCEDURE EVALUATION
[facility]  Anesthesia Pre-Operative Evaluation         Patient Name: iLly Green  YOB: 1950  MRN: [id number]    SUBJECTIVE:     Pre-operative evaluation for Procedure(s) (LRB):  ROBOTIC BRONCHOSCOPY (N/A)     10/20/2023    Lily Green is a 73 y.o. female w/ a significant PMHx of HTN, T2DM, ESRD on HD T/Th/Sat, celiacs sp colectomy.    Patient now presents for the above procedure(s).      LDA: None documented.       Hemodialysis Catheter 01/25/23 1501 right internal jugular (Active)   Number of days: 267            Ileostomy 01/16/23 1230 RLQ (Active)   Number of days: 276        Prev airway: None documented.    Drips: None documented.       Patient Active Problem List   Diagnosis    Type 2 diabetes mellitus with diabetic polyneuropathy, without long-term current use of insulin    Hyperlipidemia, mixed    GERD without esophagitis    Mild recurrent major depression    Primary insomnia    Chronic neck pain    DDD (degenerative disc disease), cervical    Screening mammogram, encounter for    Celiac disease    Hand arthritis    Benign essential tremor    Chronic pain of both knees    Irritable bowel syndrome    TANIA (stress urinary incontinence, female)    Meniere's disease of both ears    Ankle weakness    Psoriasis    Idiopathic neuropathy    Bilateral foot pain    Generalized anxiety disorder    Cognitive complaints    Peritonitis    Shock    Large bowel ischemia    Elevated lactic acid level    Hyponatremia    Primary hypertension    Problem with vascular access    Anemia    Hypophosphatemia    Debility    Weakness       Review of patient's allergies indicates:   Allergen Reactions    Crestor [rosuvastatin] Swelling    Gluten protein        Current Outpatient Medications:  No current facility-administered medications for this encounter.    Past Surgical History:   Procedure Laterality Date     APPENDECTOMY      BREAST SURGERY      Tags    CARPAL TUNNEL RELEASE Bilateral 09/2017    ,     CLOSURE, COLOSTOMY Left 1/16/2023    Procedure: CLOSURE, COLOSTOMY;  Surgeon: Manuel Javier MD;  Location: [facility] OR;  Service: General;  Laterality: Left;    COLONOSCOPY  04/2018    Normal  ([place] Juan A)     COLOSTOMY Right 1/16/2023    Procedure: CREATION, COLOSTOMY;  Surgeon: Manuel Javier MD;  Location: [facility] OR;  Service: General;  Laterality: Right;    DILATION AND CURETTAGE OF UTERUS  1986    DUPUYTREN CONTRACTURE RELEASE Bilateral 09/2017    HYSTERECTOMY  1987    BEAU w/ appy, no BSO     INJECTION OF NEUROLYTIC AGENT AROUND LUMBAR SYMPATHETIC NERVE N/A 1/6/2022    Procedure: BLOCK, LUMBAR SYMPATHETIC;  Surgeon: Souleymane Rizo Jr., MD;  Location: [facility] PAIN MGT;  Service: Pain Management;  Laterality: N/A;  no pacemaker   pt is diabetic     INJECTION OF NEUROLYTIC AGENT AROUND LUMBAR SYMPATHETIC NERVE N/A 8/25/2022    Procedure: BLOCK, LUMBAR SYMPATHETIC;  Surgeon: Souleymane Rizo Jr., MD;  Location: [facility] PAIN MGT;  Service: Pain Management;  Laterality: N/A;  diabetic     INSERTION OF TUNNELED CENTRAL VENOUS HEMODIALYSIS CATHETER Right 1/25/2023    Procedure: INSERTION, CATHETER, HEMODIALYSIS, DUAL LUMEN;  Surgeon: Manuel Javier MD;  Location: [facility] OR;  Service: General;  Laterality: Right;    INSERTION, CATHETER, TUNNELED Left 1/28/2023    Procedure: Insertion,catheter,tunneled;  Surgeon: Watson Fontenot MD;  Location: [facility] OR;  Service: General;  Laterality: Left;    LAPAROTOMY, EXPLORATORY N/A 1/14/2023    Procedure: LAPAROTOMY, EXPLORATORY;  Surgeon: Manuel Javier MD;  Location: [facility] OR;  Service: General;  Laterality: N/A;    LAPAROTOMY, EXPLORATORY N/A 1/16/2023    Procedure: LAPAROTOMY, EXPLORATORY;  Surgeon: Manuel Javier MD;  Location: [facility] OR;  Service: General;  Laterality: N/A;    REMOVAL OF CATHETER Right 1/28/2023    Procedure: REMOVAL, CATHETER;  Surgeon:  Watson Fontenot MD;  Location: [facility] OR;  Service: General;  Laterality: Right;    REMOVAL, TUNNELED CATH Right 1/25/2023    Procedure: REMOVAL, TUNNELED CATH;  Surgeon: Manuel Javier MD;  Location: [facility] OR;  Service: General;  Laterality: Right;    SHOULDER SURGERY  2009 & 2010    right rotator cuff    TONSILLECTOMY      UPPER GASTROINTESTINAL ENDOSCOPY  04/2018       Social History     Socioeconomic History    Marital status:    Tobacco Use    Smoking status: Never    Smokeless tobacco: Never   Substance and Sexual Activity    Alcohol use: No    Drug use: Never    Sexual activity: Not Currently     Partners: Male     Birth control/protection: See Surgical Hx     Comment: :      Social Determinants of Health     Financial Resource Strain: Unknown (8/20/2023)    Overall Financial Resource Strain (CARDIA)     Difficulty of Paying Living Expenses: Patient refused   Food Insecurity: Unknown (8/20/2023)    Hunger Vital Sign     Worried About Running Out of Food in the Last Year: Patient refused     Ran Out of Food in the Last Year: Patient refused   Transportation Needs: Unknown (8/20/2023)    PRAPARE - Transportation     Lack of Transportation (Medical): Patient refused     Lack of Transportation (Non-Medical): Patient refused   Physical Activity: Inactive (8/20/2023)    Exercise Vital Sign     Days of Exercise per Week: 0 days     Minutes of Exercise per Session: 10 min   Stress: No Stress Concern Present (8/20/2023)    [employer] - Occupational Stress Questionnaire     Feeling of Stress : Not at all   Social Connections: Unknown (8/20/2023)    Social Connection and Isolation Panel [NHANES]     Frequency of Communication with Friends and Family: More than three times a week     Frequency of Social Gatherings with Friends and Family: More than three times a week     Active Member of Clubs or Organizations: Yes     Attends Club or Organization  Meetings: More than 4 times per year     Marital Status:    Housing Stability: Unknown (8/20/2023)    Housing Stability Vital Sign     Unable to Pay for Housing in the Last Year: No     Number of Places Lived in the Last Year: 1     Unstable Housing in the Last Year: Patient refused       OBJECTIVE:     Vital Signs Range (Last 24H):         Significant Labs:  Lab Results   Component Value Date    WBC 8.55 03/06/2023    HGB 9.4 (L) 03/06/2023    HCT 30.9 (L) 03/06/2023     03/06/2023    CHOL 149 11/01/2022    TRIG 96 01/14/2023    HDL 54 11/01/2022    ALT 14 03/06/2023    AST 17 03/06/2023     (L) 10/14/2023    K 5.5 (H) 10/14/2023    CL 95 (L) 10/14/2023    CREATININE 6.66 (H) 10/14/2023    BUN 38 (H) 10/14/2023    CO2 21 (L) 10/14/2023    TSH 1.821 01/13/2023    INR 1.3 (H) 01/17/2023    GLUF 86 07/11/2023    HGBA1C 5.3 07/11/2023       Diagnostic Studies: No relevant studies.    EKG:   Results for orders placed or performed during the hospital encounter of 01/27/23   EKG 12-lead    Collection Time: 01/27/23 10:23 PM    Narrative    Test Reason : N18.6,    Vent. Rate : 090 BPM     Atrial Rate : 090 BPM     P-R Int : 158 ms          QRS Dur : 080 ms      QT Int : 356 ms       P-R-T Axes : 064 044 049 degrees     QTc Int : 435 ms    Normal sinus rhythm  Low voltage QRS  Borderline Abnormal ECG  When compared with ECG of 13-JAN-2023 13:54,  Premature ventricular complexes are no longer Present  Vent. rate has decreased  BPM  QRS duration has decreased  Nonspecific T wave abnormality no longer evident in Inferior leads  Nonspecific T wave abnormality no longer evident in Anterior-lateral leads  Confirmed by Noah Saini MD ([id number]) on 1/30/2023 1:48:06 PM    Referred By: [email or web address]   SELF           Confirmed By:Noah Saini MD       2D ECHO:  TTE:  Results for orders placed or performed during the hospital encounter of 01/13/23   Echo   Result Value Ref Range    BSA 1.67 m2    TDI  "SEPTAL 0.09 m/s    LV LATERAL E/E' RATIO 8.83 m/s    LV SEPTAL E/E' RATIO 5.89 m/s    IVC diameter 1.12 cm    Left Ventricular Outflow Tract Mean Velocity 0.42 cm/s    Left Ventricular Outflow Tract Mean Gradient 0.83 mmHg    TDI LATERAL 0.06 m/s    LVIDd 3.64 3.5 - 6.0 cm    IVS 1.38 (A) 0.6 - 1.1 cm    Posterior Wall 1.32 (A) 0.6 - 1.1 cm    Ao root annulus 3.23 cm    LVIDs 3.25 2.1 - 4.0 cm    FS 11 28 - 44 %    LV mass 172.50 g    LA size 3.09 cm    RVDD 1.94 cm    RV S' 0.02 cm/s    Left Ventricle Relative Wall Thickness 0.73 cm    AV regurgitation pressure 1/2 time 535.396309991617646 ms    AV mean gradient 4 mmHg    AV valve area 2.78 cm2    AV Velocity Ratio 0.48     AV index (prosthetic) 0.71     MV mean gradient 1 mmHg    MV valve area p 1/2 method 4.87 cm2    MV valve area by continuity eq 4.18 cm2    E/A ratio 0.90     Mean e' 0.08 m/s    E wave deceleration time 317.25 msec    IVRT 97.05 msec    MV "A" wave duration 151.93316058705951 msec    Pulm vein S/D ratio 1.82     LVOT diameter 2.23 cm    LVOT area 3.9 cm2    LVOT peak king 0.64 m/s    LVOT peak VTI 12.10 cm    Ao peak king 1.34 m/s    Ao VTI 17.0 cm    Mr max king 4.04 m/s    LVOT stroke volume 47.24 cm3    AV peak gradient 7 mmHg    MV peak gradient 2 mmHg    E/E' ratio 7.07 m/s    MV Peak E King 0.53 m/s    AR Max King 2.38 m/s    TR Max King 2.35 m/s    MV VTI 11.3 cm    MV stenosis pressure 1/2 time 45.13 ms    MV Peak A King 0.59 m/s    PV Peak S King 0.40 m/s    PV Peak D King 0.22 m/s    LV Systolic Volume 42.48 mL    LV Systolic Volume Index 25.4 mL/m2    LV Diastolic Volume 55.74 mL    LV Diastolic Volume Index 33.38 mL/m2    LV Mass Index 103 g/m2    RA Major Axis 3.97 cm    Left Atrium Minor Axis 3.22 cm    Left Atrium Major Axis 3.36 cm    Triscuspid Valve Regurgitation Peak Gradient 22 mmHg    LA Volume Index (Mod) 23.1 mL/m2    LA volume (mod) 38.55 cm3    EF 35 %    Narrative    · Concentric hypertrophy and  · Normal right ventricular size " with normal right ventricular systolic   function.  · The estimated ejection fraction is 35%.  · Grade I left ventricular diastolic dysfunction.  · Mild aortic regurgitation.  · Mild mitral regurgitation.  · Mild tricuspid regurgitation.  · There is no pulmonary hypertension.          JARVIS:  No results found for this or any previous visit.    ASSESSMENT/PLAN:           Pre-op Assessment    I have reviewed the Patient Summary Reports.     I have reviewed the Nursing Notes. I have reviewed the NPO Status.   I have reviewed the Medications.     Review of Systems  Anesthesia Hx:  No problems with previous Anesthesia  History of prior surgery of interest to airway management or planning: Previous anesthesia: General Denies Family Hx of Anesthesia complications.   Denies Personal Hx of Anesthesia complications.   Hematology/Oncology:  Hematology Normal   Oncology Normal     EENT/Dental:EENT/Dental Normal   Cardiovascular:   Hypertension    Pulmonary:   Persistent lung nodule   Renal/:   Chronic Renal Disease (Dry Weight 55kg), ESRD, Dialysis    Hepatic/GI:   PUD, GERD    Musculoskeletal:   Arthritis     Neurological:  Neurology Normal    Endocrine:   Diabetes, type 2    Psych:   anxiety depression          Physical Exam  General: Well nourished, Cooperative, Alert and Oriented    Airway:  Mallampati: II / II  Mouth Opening: Normal  TM Distance: Normal  Tongue: Normal  Neck ROM: Normal ROM    Dental:  Intact    Chest/Lungs:  Clear to auscultation, Normal Respiratory Rate    Heart:  Rate: Normal        Anesthesia Plan  Type of Anesthesia, risks & benefits discussed:    Anesthesia Type: Gen ETT, Gen Supraglottic Airway  Intra-op Monitoring Plan: Standard ASA Monitors  Post Op Pain Control Plan: multimodal analgesia and IV/PO Opioids PRN  Induction:  IV  Airway Plan: Direct, Post-Induction  Informed Consent: Informed consent signed with the Patient and all parties understand the risks and agree with anesthesia plan.  All  questions answered.   ASA Score: 3  Day of Surgery Review of History & Physical: H&P Update referred to the surgeon/provider.    Ready For Surgery From Anesthesia Perspective.     .

## 2023-10-20 NOTE — ANESTHESIA POSTPROCEDURE EVALUATION
Anesthesia Post Evaluation    Patient: Lily Green    Procedure(s) Performed: Procedure(s) (LRB):  ROBOTIC BRONCHOSCOPY (N/A)    Final Anesthesia Type: general      Patient location during evaluation: PACU  Patient participation: Yes- Able to Participate  Level of consciousness: awake and alert  Post-procedure vital signs: reviewed and stable  Pain management: adequate  Airway patency: patent  NONI mitigation strategies: Multimodal analgesia, Preoperative use of mandibular advancement devices or oral appliances and Intraoperative administration of CPAP, nasopharyngeal airway, or oral appliance during sedation  PONV status at discharge: No PONV  Anesthetic complications: no      Cardiovascular status: blood pressure returned to baseline, hemodynamically stable and stable  Respiratory status: unassisted and spontaneous ventilation  Hydration status: euvolemic  Follow-up not needed.          Vitals Value Taken Time   /60 10/20/23 1332   Temp  10/20/23 1357   Pulse 71 10/20/23 1337   Resp 16 10/20/23 1337   SpO2 98 % 10/20/23 1337   Vitals shown include unvalidated device data.      No case tracking events are documented in the log.      Pain/Meredith Score: Meredith Score: 9 (10/20/2023 12:57 PM)

## 2023-10-20 NOTE — HPI
73 year old who developed a perforated intestine and developed severe sepsis with HINA and now HD dependent.  Patient was scheduled for ostomy reversal and a CT of the abdomen was obtained which reported subcentimeter nodules.  Patient is a lifelong nonsmoker without a history of cancer.   PET CT obtained 10/9 which revealed 1.0 cm solid pulmonary nodule in the right middle lobe with mildly increased tracer uptake, suspicious for neoplasm.  Percutaneous sampling required for definitive diagnosis.  No suspicious tracer uptake elsewhere. Admitted today for robotic bronchoscopy.  Patient feeling well today, has been NPO since midnight. No blood thinners.

## 2023-10-20 NOTE — ANESTHESIA PROCEDURE NOTES
Intubation    Date/Time: 10/20/2023 11:31 AM    Performed by: Miguelina Negro CRNA  Authorized by: Guillaume Badillo MD    Intubation:     Induction:  Intravenous    Intubated:  Postinduction    Mask Ventilation:  Easy mask    Attempts:  2    Attempted By:  Student    Method of Intubation:  Direct    Blade:  Chacon 2    Laryngeal View Grade: Grade III - only epiglottis visible      Attempted By (2nd Attempt):  CRNA    Method of Intubation (2nd Attempt):  Direct    Blade (2nd Attempt):  Raul 3    Laryngeal View Grade (2nd Attempt): Grade IIa - cords partially seen      Difficult Airway Encountered?: No      Complications:  None    Airway Device:  Oral endotracheal tube    Airway Device Size:  8.0    Style/Cuff Inflation:  Cuffed (inflated to minimal occlusive pressure)    Tube secured:  21    Secured at:  The lips    Placement Verified By:  Capnometry    Complicating Factors:  Anterior larynx    Findings Post-Intubation:  BS equal bilateral and atraumatic/condition of teeth unchanged

## 2023-10-20 NOTE — SUBJECTIVE & OBJECTIVE
Past Medical History:   Diagnosis Date    Anxiety     Celiac disease 2018    Celiac disease     Depression     Diabetes mellitus     Family history of breast cancer in mother      at age 68    Hyperlipidemia     Hypertension     Meniere disease     Meniere's disease, unspecified ear     Menopause     Peptic ulcer     Reflux esophagitis     Urinary tract infection     Vaginal infection     Vaginal Pap smear 2014    Pap/Hpv Negative        Past Surgical History:   Procedure Laterality Date    APPENDECTOMY      BREAST SURGERY      Tags    CARPAL TUNNEL RELEASE Bilateral 2017    ,     CLOSURE, COLOSTOMY Left 2023    Procedure: CLOSURE, COLOSTOMY;  Surgeon: Manuel Javier MD;  Location: [facility] OR;  Service: General;  Laterality: Left;    COLONOSCOPY  2018    Normal  ( Juan A)     COLOSTOMY Right 2023    Procedure: CREATION, COLOSTOMY;  Surgeon: Manuel Javier MD;  Location: [facility] OR;  Service: General;  Laterality: Right;    DILATION AND CURETTAGE OF UTERUS  1986    DUPUYTREN CONTRACTURE RELEASE Bilateral 2017    HYSTERECTOMY      BEAU w/ appy, no BSO     INJECTION OF NEUROLYTIC AGENT AROUND LUMBAR SYMPATHETIC NERVE N/A 2022    Procedure: BLOCK, LUMBAR SYMPATHETIC;  Surgeon: Souleymane Rizo Jr., MD;  Location: [facility] PAIN MGT;  Service: Pain Management;  Laterality: N/A;  no pacemaker   pt is diabetic     INJECTION OF NEUROLYTIC AGENT AROUND LUMBAR SYMPATHETIC NERVE N/A 2022    Procedure: BLOCK, LUMBAR SYMPATHETIC;  Surgeon: Souleymane Rizo Jr., MD;  Location: [facility] PAIN [facility]GT;  Service: Pain Management;  Laterality: N/A;  diabetic     INSERTION OF TUNNELED CENTRAL VENOUS HEMODIALYSIS CATHETER Right 2023    Procedure: INSERTION, CATHETER, HEMODIALYSIS, DUAL LUMEN;  Surgeon: Manuel Javier MD;  Location: [facility] OR;  Service: General;  Laterality: Right;    INSERTION, CATHETER, TUNNELED Left 2023    Procedure: Insertion,catheter,tunneled;  Surgeon: Watson  CHANTAL Fontenot MD;  Location: [facility] OR;  Service: General;  Laterality: Left;    LAPAROTOMY, EXPLORATORY N/A 1/14/2023    Procedure: LAPAROTOMY, EXPLORATORY;  Surgeon: Manuel Javier MD;  Location: [facility] OR;  Service: General;  Laterality: N/A;    LAPAROTOMY, EXPLORATORY N/A 1/16/2023    Procedure: LAPAROTOMY, EXPLORATORY;  Surgeon: Manuel Javier MD;  Location: [facility] OR;  Service: General;  Laterality: N/A;    REMOVAL OF CATHETER Right 1/28/2023    Procedure: REMOVAL, CATHETER;  Surgeon: Watson Fontenot MD;  Location: [facility] OR;  Service: General;  Laterality: Right;    REMOVAL, TUNNELED CATH Right 1/25/2023    Procedure: REMOVAL, TUNNELED CATH;  Surgeon: Manuel Javier MD;  Location: [facility] OR;  Service: General;  Laterality: Right;    SHOULDER SURGERY  2009 & 2010    right rotator cuff    TONSILLECTOMY      UPPER GASTROINTESTINAL ENDOSCOPY  04/2018       Review of patient's allergies indicates:   Allergen Reactions    Crestor [rosuvastatin] Swelling    Gluten protein        Family History       Problem Relation (Age of Onset)    Arthritis Father    Breast cancer Mother, Paternal Aunt    Cancer Mother, Paternal Aunt    Diabetes Paternal Grandfather    Hyperlipidemia Sister    Vision loss Father, Mother, Sister          Tobacco Use    Smoking status: Never    Smokeless tobacco: Never   Substance and Sexual Activity    Alcohol use: No    Drug use: Never    Sexual activity: Not Currently     Partners: Male     Birth control/protection: See Surgical Hx     Comment: :          Review of Systems   Constitutional:  Negative for chills and fever.   HENT:  Negative for sinus pain and sore throat.    Respiratory:  Negative for cough and shortness of breath.    Cardiovascular:  Negative for chest pain and palpitations.   Gastrointestinal:  Negative for diarrhea and vomiting.   Musculoskeletal:  Negative for arthralgias and back pain.   Neurological:  Negative for seizures and syncope.   Psychiatric/Behavioral:   Negative for confusion. The patient is not nervous/anxious.    All other systems reviewed and are negative.    Objective:     Vital Signs (Most Recent):  Temp: 98.1 °F (36.7 °C) (10/20/23 0930)  Pulse: 80 (10/20/23 0930)  Resp: 16 (10/20/23 0930)  BP: 110/60 (10/20/23 0930)  SpO2: 99 % (10/20/23 0930) Vital Signs (24h Range):  Temp:  [98.1 °F (36.7 °C)] 98.1 °F (36.7 °C)  Pulse:  [80] 80  Resp:  [16] 16  SpO2:  [99 %] 99 %  BP: (110)/(60) 110/60     Weight: 59 kg (130 lb)  Body mass index is 21.63 kg/m².    No intake or output data in the 24 hours ending 10/20/23 1113     Physical Exam  Vitals reviewed.   Constitutional:       General: She is not in acute distress.     Appearance: She is normal weight. She is not toxic-appearing.   HENT:      Head: Normocephalic and atraumatic.      Mouth/Throat:      Mouth: Mucous membranes are moist.      Pharynx: Oropharynx is clear.   Eyes:      Extraocular Movements: Extraocular movements intact.      Conjunctiva/sclera: Conjunctivae normal.   Cardiovascular:      Rate and Rhythm: Normal rate.      Heart sounds: No murmur heard.  Pulmonary:      Breath sounds: Normal breath sounds. No wheezing or rhonchi.   Abdominal:      General: There is no distension.      Palpations: Abdomen is soft.      Tenderness: There is no abdominal tenderness.   Musculoskeletal:      Cervical back: Normal range of motion and neck supple.      Comments: LUE fistula with bruising   Skin:     General: Skin is warm and dry.      Capillary Refill: Capillary refill takes less than 2 seconds.   Neurological:      Mental Status: She is alert and oriented to person, place, and time. Mental status is at baseline.          Vents:       Lines/Drains/Airways       Central Venous Catheter Line  Duration                  Hemodialysis Catheter 01/25/23 1501 right internal jugular 267 days              Drain  Duration                  Ileostomy 01/16/23 1230  days                    Significant  "Labs:    CBC/Anemia Profile:  No results for input(s): "WBC", "HGB", "HCT", "PLT", "MCV", "RDW", "IRON", "FERRITIN", "RETIC", "FOLATE", "[id number]", "OCCULTBLOOD" in the last 48 hours.     Chemistries:  No results for input(s): "NA", "K", "CL", "CO2", "BUN", "CREATININE", "CALCIUM", "ALBUMIN", "PROT", "BILITOT", "ALKPHOS", "ALT", "AST", "GLUCOSE", "MG", "PHOS" in the last 48 hours.    All pertinent labs within the past 24 hours have been reviewed.    Significant Imaging:   I have reviewed all pertinent imaging results/findings within the past 24 hours.  "

## 2023-10-20 NOTE — TRANSFER OF CARE
"Anesthesia Transfer of Care Note    Patient: Lily Green    Procedure(s) Performed: Procedure(s) (LRB):  ROBOTIC BRONCHOSCOPY (N/A)    Patient location: [facility]    Anesthesia Type: general    Transport from OR: Transported from OR on 6-10 L/min O2 by face mask with adequate spontaneous ventilation    Post pain: adequate analgesia    Post assessment: no apparent anesthetic complications and tolerated procedure well    Post vital signs: stable    Level of consciousness: awake    Nausea/Vomiting: no nausea/vomiting    Complications: none    Transfer of care protocol was followed      Last vitals:   Visit Vitals  BP (!) 117/54 (BP Location: Left arm, Patient Position: Lying)   Pulse 75   Temp 36.7 °C (98.1 °F) (Oral)   Resp 16   Ht 5' 5" (1.651 m)   Wt 59 kg (130 lb)   LMP  (LMP Unknown)   SpO2 100%   Breastfeeding No   BMI 21.63 kg/m²     "

## 2023-10-20 NOTE — H&P
[facility] - Surgery ([facility])  Pulmonology  H&P    Patient Name: Lily Green  MRN: [id number]  Admission Date: 10/20/2023  Code Status: Full Code  Primary Care Provider: Pavel Calixto MD   Principal Problem: <principal problem not specified>    Subjective:     HPI:  73 year old who developed a perforated intestine and developed severe sepsis with HINA and now HD dependent.  Patient was scheduled for ostomy reversal and a CT of the abdomen was obtained which reported subcentimeter nodules.  Patient is a lifelong nonsmoker without a history of cancer.   PET CT obtained 10/9 which revealed 1.0 cm solid pulmonary nodule in the right middle lobe with mildly increased tracer uptake, suspicious for neoplasm.  Percutaneous sampling required for definitive diagnosis.  No suspicious tracer uptake elsewhere. Admitted today for robotic bronchoscopy.  Patient feeling well today, has been NPO since midnight. No blood thinners.     Past Medical History:   Diagnosis Date    Anxiety     Celiac disease 2018    Celiac disease     Depression     Diabetes mellitus     Family history of breast cancer in mother      at age 68    Hyperlipidemia     Hypertension     Meniere disease     Meniere's disease, unspecified ear     Menopause     Peptic ulcer     Reflux esophagitis     Urinary tract infection     Vaginal infection     Vaginal Pap smear 2014    Pap/Hpv Negative        Past Surgical History:   Procedure Laterality Date    APPENDECTOMY      BREAST SURGERY      Tags    CARPAL TUNNEL RELEASE Bilateral 2017    ,     CLOSURE, COLOSTOMY Left 2023    Procedure: CLOSURE, COLOSTOMY;  Surgeon: Manuel Javier MD;  Location: [facility] OR;  Service: General;  Laterality: Left;    COLONOSCOPY  2018    Normal  (Cornell Velazco)     COLOSTOMY Right 2023    Procedure: CREATION, COLOSTOMY;  Surgeon: Manuel Javier MD;  Location: [facility] OR;  Service: General;  Laterality: Right;     DILATION AND CURETTAGE OF UTERUS  1986    DUPUYTREN CONTRACTURE RELEASE Bilateral 09/2017    HYSTERECTOMY  1987    BEAU w/ appy, no BSO     INJECTION OF NEUROLYTIC AGENT AROUND LUMBAR SYMPATHETIC NERVE N/A 1/6/2022    Procedure: BLOCK, LUMBAR SYMPATHETIC;  Surgeon: Souleymane Rizo Jr., MD;  Location: [facility] PAIN MGT;  Service: Pain Management;  Laterality: N/A;  no pacemaker   pt is diabetic     INJECTION OF NEUROLYTIC AGENT AROUND LUMBAR SYMPATHETIC NERVE N/A 8/25/2022    Procedure: BLOCK, LUMBAR SYMPATHETIC;  Surgeon: Souleymane Rizo Jr., MD;  Location: [facility] PAIN MGT;  Service: Pain Management;  Laterality: N/A;  diabetic     INSERTION OF TUNNELED CENTRAL VENOUS HEMODIALYSIS CATHETER Right 1/25/2023    Procedure: INSERTION, CATHETER, HEMODIALYSIS, DUAL LUMEN;  Surgeon: Manuel Javier MD;  Location: [facility] OR;  Service: General;  Laterality: Right;    INSERTION, CATHETER, TUNNELED Left 1/28/2023    Procedure: Insertion,catheter,tunneled;  Surgeon: Watson Fontenot MD;  Location: [facility] OR;  Service: General;  Laterality: Left;    LAPAROTOMY, EXPLORATORY N/A 1/14/2023    Procedure: LAPAROTOMY, EXPLORATORY;  Surgeon: Manuel Javier MD;  Location: [facility] OR;  Service: General;  Laterality: N/A;    LAPAROTOMY, EXPLORATORY N/A 1/16/2023    Procedure: LAPAROTOMY, EXPLORATORY;  Surgeon: Manuel Javier MD;  Location: [facility] OR;  Service: General;  Laterality: N/A;    REMOVAL OF CATHETER Right 1/28/2023    Procedure: REMOVAL, CATHETER;  Surgeon: Watson Fontenot MD;  Location: [facility] OR;  Service: General;  Laterality: Right;    REMOVAL, TUNNELED CATH Right 1/25/2023    Procedure: REMOVAL, TUNNELED CATH;  Surgeon: Manuel Javier MD;  Location: [facility] OR;  Service: General;  Laterality: Right;    SHOULDER SURGERY  2009 & 2010    right rotator cuff    TONSILLECTOMY      UPPER GASTROINTESTINAL ENDOSCOPY  04/2018       Review of patient's allergies indicates:   Allergen Reactions    Crestor [rosuvastatin]  Swelling    Gluten protein        Family History       Problem Relation (Age of Onset)    Arthritis Father    Breast cancer Mother, Paternal Aunt    Cancer Mother, Paternal Aunt    Diabetes Paternal Grandfather    Hyperlipidemia Sister    Vision loss Father, Mother, Sister          Tobacco Use    Smoking status: Never    Smokeless tobacco: Never   Substance and Sexual Activity    Alcohol use: No    Drug use: Never    Sexual activity: Not Currently     Partners: Male     Birth control/protection: See Surgical Hx     Comment: :          Review of Systems   All other systems reviewed and are negative.    Objective:     Vital Signs (Most Recent):  Temp: 98.1 °F (36.7 °C) (10/20/23 0930)  Pulse: 80 (10/20/23 0930)  Resp: 16 (10/20/23 0930)  BP: 110/60 (10/20/23 0930)  SpO2: 99 % (10/20/23 0930) Vital Signs (24h Range):  Temp:  [98.1 °F (36.7 °C)] 98.1 °F (36.7 °C)  Pulse:  [80] 80  Resp:  [16] 16  SpO2:  [99 %] 99 %  BP: (110)/(60) 110/60     Weight: 59 kg (130 lb)  Body mass index is 21.63 kg/m².    No intake or output data in the 24 hours ending 10/20/23 1111     Physical Exam  Vitals and nursing note reviewed.   Constitutional:       Appearance: Normal appearance.   HENT:      Head: Normocephalic and atraumatic.      Mouth/Throat:      Mouth: Mucous membranes are moist.   Eyes:      General: No scleral icterus.  Cardiovascular:      Rate and Rhythm: Normal rate and regular rhythm.   Pulmonary:      Effort: Pulmonary effort is normal. No respiratory distress.      Breath sounds: Normal breath sounds.   Skin:     General: Skin is warm and dry.      Comments: LUE fistula still with staples and extensive bruising but +thrill    Neurological:      General: No focal deficit present.      Mental Status: She is alert and oriented to person, place, and time.   Psychiatric:         Mood and Affect: Mood normal.         Behavior: Behavior normal.          Vents:       Lines/Drains/Airways       Central Venous  "Catheter Line  Duration                  Hemodialysis Catheter 01/25/23 1501 right internal jugular 267 days              Drain  Duration                  Ileostomy 01/16/23 1230  days                    Significant Labs:    CBC/Anemia Profile:  No results for input(s): "WBC", "HGB", "HCT", "PLT", "MCV", "RDW", "IRON", "FERRITIN", "RETIC", "FOLATE", "[id number]", "OCCULTBLOOD" in the last 48 hours.     Chemistries:  No results for input(s): "NA", "K", "CL", "CO2", "BUN", "CREATININE", "CALCIUM", "ALBUMIN", "PROT", "BILITOT", "ALKPHOS", "ALT", "AST", "GLUCOSE", "MG", "PHOS" in the last 48 hours.    All pertinent labs within the past 24 hours have been reviewed.    Significant Imaging:   I have reviewed all pertinent imaging results/findings within the past 24 hours.    Assessment/Plan:     Pulmonary  Lung nodule  RML nodule which is persistent with low PET avidity but stable size of 2 cm. We will plan for diagnostic procedures rather than expectant management with multiple recurrent CT scanning.   - navigational robotic bronchoscopy today           María Palencia MD  Pulmonology  [facility] - Surgery (2nd Fl)      "

## 2023-10-20 NOTE — ASSESSMENT & PLAN NOTE
RML nodule which is persistent with low PET avidity but stable size of 2 cm. We will plan for diagnostic procedures rather than expectant management with multiple recurrent CT scanning.   - navigational robotic bronchoscopy today

## 2023-10-20 NOTE — SUBJECTIVE & OBJECTIVE
Past Medical History:   Diagnosis Date    Anxiety     Celiac disease 2018    Celiac disease     Depression     Diabetes mellitus     Family history of breast cancer in mother      at age 68    Hyperlipidemia     Hypertension     Meniere disease     Meniere's disease, unspecified ear     Menopause     Peptic ulcer     Reflux esophagitis     Urinary tract infection     Vaginal infection     Vaginal Pap smear 2014    Pap/Hpv Negative        Past Surgical History:   Procedure Laterality Date    APPENDECTOMY      BREAST SURGERY      Tags    CARPAL TUNNEL RELEASE Bilateral 2017    ,     CLOSURE, COLOSTOMY Left 2023    Procedure: CLOSURE, COLOSTOMY;  Surgeon: Manuel Javier MD;  Location: [facility] OR;  Service: General;  Laterality: Left;    COLONOSCOPY  2018    Normal  ( Juan A)     COLOSTOMY Right 2023    Procedure: CREATION, COLOSTOMY;  Surgeon: Manuel Javier MD;  Location: [facility] OR;  Service: General;  Laterality: Right;    DILATION AND CURETTAGE OF UTERUS  1986    DUPUYTREN CONTRACTURE RELEASE Bilateral 2017    HYSTERECTOMY      BEAU w/ appy, no BSO     INJECTION OF NEUROLYTIC AGENT AROUND LUMBAR SYMPATHETIC NERVE N/A 2022    Procedure: BLOCK, LUMBAR SYMPATHETIC;  Surgeon: Souleymane Rizo Jr., MD;  Location: [facility] PAIN [facility]GT;  Service: Pain Management;  Laterality: N/A;  no pacemaker   pt is diabetic     INJECTION OF NEUROLYTIC AGENT AROUND LUMBAR SYMPATHETIC NERVE N/A 2022    Procedure: BLOCK, LUMBAR SYMPATHETIC;  Surgeon: Souleymane Rizo Jr., MD;  Location: [facility] PAIN [facility]GT;  Service: Pain Management;  Laterality: N/A;  diabetic     INSERTION OF TUNNELED CENTRAL VENOUS HEMODIALYSIS CATHETER Right 2023    Procedure: INSERTION, CATHETER, HEMODIALYSIS, DUAL LUMEN;  Surgeon: Manuel Javier MD;  Location: [facility] OR;  Service: General;  Laterality: Right;    INSERTION, CATHETER, TUNNELED Left 2023    Procedure: Insertion,catheter,tunneled;  Surgeon: Watson  CHANTAL Fontenot MD;  Location: [facility] OR;  Service: General;  Laterality: Left;    LAPAROTOMY, EXPLORATORY N/A 1/14/2023    Procedure: LAPAROTOMY, EXPLORATORY;  Surgeon: Manuel Javier MD;  Location: [facility] OR;  Service: General;  Laterality: N/A;    LAPAROTOMY, EXPLORATORY N/A 1/16/2023    Procedure: LAPAROTOMY, EXPLORATORY;  Surgeon: Manuel Javier MD;  Location: [facility] OR;  Service: General;  Laterality: N/A;    REMOVAL OF CATHETER Right 1/28/2023    Procedure: REMOVAL, CATHETER;  Surgeon: Watson Fontenot MD;  Location: [facility] OR;  Service: General;  Laterality: Right;    REMOVAL, TUNNELED CATH Right 1/25/2023    Procedure: REMOVAL, TUNNELED CATH;  Surgeon: Manuel Javier MD;  Location: [facility] OR;  Service: General;  Laterality: Right;    SHOULDER SURGERY  2009 & 2010    right rotator cuff    TONSILLECTOMY      UPPER GASTROINTESTINAL ENDOSCOPY  04/2018       Review of patient's allergies indicates:   Allergen Reactions    Crestor [rosuvastatin] Swelling    Gluten protein        Family History       Problem Relation (Age of Onset)    Arthritis Father    Breast cancer Mother, Paternal Aunt    Cancer Mother, Paternal Aunt    Diabetes Paternal Grandfather    Hyperlipidemia Sister    Vision loss Father, Mother, Sister          Tobacco Use    Smoking status: Never    Smokeless tobacco: Never   Substance and Sexual Activity    Alcohol use: No    Drug use: Never    Sexual activity: Not Currently     Partners: Male     Birth control/protection: See Surgical Hx     Comment: :          Review of Systems   All other systems reviewed and are negative.    Objective:     Vital Signs (Most Recent):  Temp: 98.1 °F (36.7 °C) (10/20/23 0930)  Pulse: 80 (10/20/23 0930)  Resp: 16 (10/20/23 0930)  BP: 110/60 (10/20/23 0930)  SpO2: 99 % (10/20/23 0930) Vital Signs (24h Range):  Temp:  [98.1 °F (36.7 °C)] 98.1 °F (36.7 °C)  Pulse:  [80] 80  Resp:  [16] 16  SpO2:  [99 %] 99 %  BP: (110)/(60) 110/60     Weight: 59 kg (130 lb)  Body  "mass index is 21.63 kg/m².    No intake or output data in the 24 hours ending 10/20/23 1111     Physical Exam  Vitals and nursing note reviewed.   Constitutional:       Appearance: Normal appearance.   HENT:      Head: Normocephalic and atraumatic.      Mouth/Throat:      Mouth: Mucous membranes are moist.   Eyes:      General: No scleral icterus.  Cardiovascular:      Rate and Rhythm: Normal rate and regular rhythm.   Pulmonary:      Effort: Pulmonary effort is normal. No respiratory distress.      Breath sounds: Normal breath sounds.   Skin:     General: Skin is warm and dry.      Comments: LUE fistula still with staples and extensive bruising but +thrill    Neurological:      General: No focal deficit present.      Mental Status: She is alert and oriented to person, place, and time.   Psychiatric:         Mood and Affect: Mood normal.         Behavior: Behavior normal.          Vents:       Lines/Drains/Airways       Central Venous Catheter Line  Duration                  Hemodialysis Catheter 01/25/23 1501 right internal jugular 267 days              Drain  Duration                  Ileostomy 01/16/23 1230  days                    Significant Labs:    CBC/Anemia Profile:  No results for input(s): "WBC", "HGB", "HCT", "PLT", "MCV", "RDW", "IRON", "FERRITIN", "RETIC", "FOLATE", "[id number]", "OCCULTBLOOD" in the last 48 hours.     Chemistries:  No results for input(s): "NA", "K", "CL", "CO2", "BUN", "CREATININE", "CALCIUM", "ALBUMIN", "PROT", "BILITOT", "ALKPHOS", "ALT", "AST", "GLUCOSE", "MG", "PHOS" in the last 48 hours.    All pertinent labs within the past 24 hours have been reviewed.    Significant Imaging:   I have reviewed all pertinent imaging results/findings within the past 24 hours.  "

## 2023-10-23 ENCOUNTER — TELEPHONE (OUTPATIENT)
Dept: TRANSPLANT | Facility: CLINIC | Age: 73
End: 2023-10-23
Payer: MEDICARE

## 2023-10-23 ENCOUNTER — TELEPHONE (OUTPATIENT)
Dept: PULMONOLOGY | Facility: CLINIC | Age: 73
End: 2023-10-23
Payer: MEDICARE

## 2023-10-23 LAB — BACTERIA SPEC AEROBE CULT: NO GROWTH

## 2023-10-23 NOTE — TELEPHONE ENCOUNTER
Spoke with Micro lab physician (Mr Martin) informed him that I have received his message. Mr Martin advised me that they could not run pt test because it was not enough to run. I verbalized to Mr Martin that I understand and advised him that I will forward his message to Dr Monzon to review/advise.

## 2023-10-23 NOTE — TELEPHONE ENCOUNTER
----- Message from Elizabeth Jose sent at 10/23/2023  3:51 PM CDT -----  Regarding: Advise  Contact: Microbiology [phone] or [phone]  Microbiology called in and is requesting a call back. Stated they did not run test on pt because levels were low. Please call to further discuss. Thanks

## 2023-10-25 ENCOUNTER — TELEPHONE (OUTPATIENT)
Dept: PULMONOLOGY | Facility: CLINIC | Age: 73
End: 2023-10-25
Payer: MEDICARE

## 2023-10-25 DIAGNOSIS — C7A.090 MALIGNANT CARCINOID TUMOR OF RIGHT LUNG: Primary | ICD-10-CM

## 2023-10-25 LAB
ADEQUACY: ABNORMAL
FINAL PATHOLOGIC DIAGNOSIS: ABNORMAL
Lab: ABNORMAL

## 2023-10-25 NOTE — TELEPHONE ENCOUNTER
Left message on patient voicemail, informing her that I'm contacting her in regards to scheduling her PET Scan appointment in [place]. I also advised pt that if she wants to schedule appointment or if she has any questions or concerns, she may contact the office. Office number has been provided.

## 2023-10-25 NOTE — TELEPHONE ENCOUNTER
Spoke with pt, informed her that I have received her message. I also advised pt that Dr Monzon will contact her in regards to her Biopsy test results and also her PET Scan test results once the results are released and also that there is no reason at this current time to schedule appointment with Dr Monzon. Pt verbalized that she understand and ask me to advise her on what stage is her caner in. I verbalized to pt that I understand and advised pt that I will not know that information, I'm only Dr Monzon assistant. Pt verbalized that she understand.

## 2023-10-25 NOTE — TELEPHONE ENCOUNTER
----- Message from Telma Alcala sent at 10/25/2023  3:38 PM CDT -----  MARIA LUISA RAJAN calling regarding Appointment Access. per Jessy's message --PT has been scheduled for the PET scan and wanted to be scheduled to see the doctor but nothing showed available, please contact to inform [phone]

## 2023-10-26 ENCOUNTER — PATIENT MESSAGE (OUTPATIENT)
Dept: PODIATRY | Facility: CLINIC | Age: 73
End: 2023-10-26
Payer: MEDICARE

## 2023-10-27 ENCOUNTER — TELEPHONE (OUTPATIENT)
Dept: HEMATOLOGY/ONCOLOGY | Facility: CLINIC | Age: 73
End: 2023-10-27
Payer: MEDICARE

## 2023-10-27 ENCOUNTER — TELEPHONE (OUTPATIENT)
Dept: PULMONOLOGY | Facility: CLINIC | Age: 73
End: 2023-10-27
Payer: MEDICARE

## 2023-10-27 DIAGNOSIS — C7A.090 MALIGNANT CARCINOID TUMOR OF BRONCHUS: Primary | ICD-10-CM

## 2023-10-27 LAB — BACTERIA SPEC ANAEROBE CULT: NORMAL

## 2023-10-27 NOTE — TELEPHONE ENCOUNTER
Patient with a dx of carcinoid.  Complicated medical history recently including renal failure requring HD t/TH/sat.  Recovering well.  Consult thoracic surgery and rad onc after dotatate PET for carcinoid. Patient has been notified.

## 2023-10-27 NOTE — NURSING
Called patient to schedule Thoracic surgery and Rad/Onc appointment.  Patient request PET sooner.  Copper PET rescheduled to 11/01/23.  Patient scheduled with Dr. Thomson on 11/06/23 and with Dr. Meyer on 11/1023.  Offered patient sooner appointments.  Patient declined.  States that she has conflicting schedule.  Oncology Navigation   Intake  Date of Diagnosis: 10/27/23  Cancer Type: Thoracic  Internal / External Referral: Internal  Date of Referral: 10/27/23  Initial Nurse Navigator Contact: 10/27/23  Referral to Initial Contact Timeline (days): 0  Date Worked: 10/27/23  First Appointment Available: 10/27/23  Appointment Date: 10/27/23  First Available Date vs. Scheduled Date (days): 0  Multiple appointments: Yes  Reason if booked > 7 days after scheduling: Specific provider / access; Patient request     Treatment  Current Status: Staging work-up    Surgical Oncologist: Alix       Radiation Oncologist: Mitch    Procedures: PET scan          Radiation Oncologist: Mitch        Acuity  Treatment Tolerability: Has not started treatment yet/treatment fully completed and side effects resolved  Comorbidities in Medical History: 2  Hospitalization Within the Past Month: 2   Needed: 0  Support: 0  Verbalizes Financial Concerns: 0  Transportation: 0  Verbalizes the need for more education: 1  Navigation Acuity: 6     Follow Up  No follow-ups on file.

## 2023-10-28 DIAGNOSIS — E87.5 HYPERKALEMIA: Primary | ICD-10-CM

## 2023-11-03 ENCOUNTER — HOSPITAL ENCOUNTER (OUTPATIENT)
Dept: RADIOLOGY | Facility: HOSPITAL | Age: 73
Discharge: HOME | End: 2023-11-03
Attending: INTERNAL MEDICINE
Payer: MEDICARE

## 2023-11-03 ENCOUNTER — OFFICE VISIT (OUTPATIENT)
Dept: PODIATRY | Facility: CLINIC | Age: 73
End: 2023-11-03
Payer: MEDICARE

## 2023-11-03 ENCOUNTER — PATIENT MESSAGE (OUTPATIENT)
Dept: PODIATRY | Facility: CLINIC | Age: 73
End: 2023-11-03

## 2023-11-03 VITALS — BODY MASS INDEX: 21.67 KG/M2 | HEIGHT: 65 IN | WEIGHT: 130.06 LBS

## 2023-11-03 DIAGNOSIS — L84 CORN OR CALLUS: ICD-10-CM

## 2023-11-03 DIAGNOSIS — R20.2 PARESTHESIA OF BOTH FEET: ICD-10-CM

## 2023-11-03 DIAGNOSIS — E11.42 DIABETIC POLYNEUROPATHY ASSOCIATED WITH TYPE 2 DIABETES MELLITUS: Primary | ICD-10-CM

## 2023-11-03 DIAGNOSIS — M20.41 HAMMER TOES OF BOTH FEET: ICD-10-CM

## 2023-11-03 DIAGNOSIS — M20.42 HAMMER TOES OF BOTH FEET: ICD-10-CM

## 2023-11-03 DIAGNOSIS — C7A.090 MALIGNANT CARCINOID TUMOR OF RIGHT LUNG: ICD-10-CM

## 2023-11-03 DIAGNOSIS — B35.1 ONYCHOMYCOSIS DUE TO DERMATOPHYTE: ICD-10-CM

## 2023-11-03 PROCEDURE — 11721 DEBRIDE NAIL 6 OR MORE: CPT | Mod: 59,Q9,S$GLB, | Performed by: PODIATRIST

## 2023-11-03 PROCEDURE — 11056 PARNG/CUTG B9 HYPRKR LES 2-4: CPT | Mod: Q9,S$GLB,, | Performed by: PODIATRIST

## 2023-11-03 PROCEDURE — 99499 UNLISTED E&M SERVICE: CPT | Mod: S$GLB,,, | Performed by: PODIATRIST

## 2023-11-03 PROCEDURE — 11721 PR DEBRIDEMENT OF NAILS, 6 OR MORE: ICD-10-PCS | Mod: 59,Q9,S$GLB, | Performed by: PODIATRIST

## 2023-11-03 PROCEDURE — 99999 PR PBB SHADOW E&M-EST. PATIENT-LVL IV: ICD-10-PCS | Mod: PBBFAC,,, | Performed by: PODIATRIST

## 2023-11-03 PROCEDURE — 11056 PR TRIM BENIGN HYPERKERATOTIC SKIN LESION,2-4: ICD-10-PCS | Mod: Q9,S$GLB,, | Performed by: PODIATRIST

## 2023-11-03 PROCEDURE — 99499 NO LOS: ICD-10-PCS | Mod: S$GLB,,, | Performed by: PODIATRIST

## 2023-11-03 PROCEDURE — 99999 PR PBB SHADOW E&M-EST. PATIENT-LVL IV: CPT | Mod: PBBFAC,,, | Performed by: PODIATRIST

## 2023-11-04 NOTE — PROGRESS NOTES
Subjective:      Patient ID: Lily Green is a 73 y.o. female.    Chief Complaint: Diabetic Foot Exam (PCP 8/21/23) and Nail Care      Lily Kline is a 73 y.o. female who presents to the clinic for evaluation and treatment of diabetic feet. Lily Kline has a past medical history of Anxiety, Celiac disease (05/2018), Celiac disease, Depression, Diabetes mellitus, Family history of breast cancer in mother, Hyperlipidemia, Hypertension, Meniere disease, Meniere's disease, unspecified ear, Menopause, Peptic ulcer, Reflux esophagitis, Urinary tract infection, Vaginal infection, and Vaginal Pap smear (04/07/2014). Relates having toenails toenails and calluses that are in need of trimming.  Denies pain from said issues with today's exam.  Has not attempted to self treat.  Relates an increase in lower extremity paresthesias.  Symptoms are more prevalent while at rest.  Has not attempted to self treat.  Denies any additional pedal complaints.    PCP: Pavel Calixto MD    Date Last Seen by PCP: 8/23  Hemoglobin A1C   Date Value Ref Range Status   07/11/2023 5.3 4.0 - 5.6 % Final     Comment:     ADA Screening Guidelines:  5.7-6.4%  Consistent with prediabetes  >or=6.5%  Consistent with diabetes    High levels of fetal hemoglobin interfere with the HbA1C  assay. Heterozygous hemoglobin variants (HbS, HgC, etc)do  not significantly interfere with this assay.   However, presence of multiple variants may affect accuracy.     11/01/2022 5.8 (H) 4.0 - 5.6 % Final     Comment:     ADA Screening Guidelines:  5.7-6.4%  Consistent with prediabetes  >or=6.5%  Consistent with diabetes    High levels of fetal hemoglobin interfere with the HbA1C  assay. Heterozygous hemoglobin variants (HbS, HgC, etc)do  not significantly interfere with this assay.   However, presence of multiple variants may affect accuracy.     04/22/2022 6.0 (H) 4.0 - 5.6 % Final     Comment:     ADA Screening Guidelines:  5.7-6.4%  Consistent with  prediabetes  >or=6.5%  Consistent with diabetes    High levels of fetal hemoglobin interfere with the HbA1C  assay. Heterozygous hemoglobin variants (HbS, HgC, etc)do  not significantly interfere with this assay.   However, presence of multiple variants may affect accuracy.     10/23/2019 6.8 % Final         Past Medical History:   Diagnosis Date    Anxiety     Celiac disease 2018    Celiac disease     Depression     Diabetes mellitus     Family history of breast cancer in mother      at age 68    Hyperlipidemia     Hypertension     Meniere disease     Meniere's disease, unspecified ear     Menopause     Peptic ulcer     Reflux esophagitis     Urinary tract infection     Vaginal infection     Vaginal Pap smear 2014    Pap/Hpv Negative        Past Surgical History:   Procedure Laterality Date    APPENDECTOMY      BREAST SURGERY      Tags    CARPAL TUNNEL RELEASE Bilateral 2017    ,     CLOSURE, COLOSTOMY Left 2023    Procedure: CLOSURE, COLOSTOMY;  Surgeon: Manuel Javier MD;  Location: [facility] OR;  Service: General;  Laterality: Left;    COLONOSCOPY  2018    Normal  ([place])     COLOSTOMY Right 2023    Procedure: CREATION, COLOSTOMY;  Surgeon: Manuel Javier MD;  Location: [facility] OR;  Service: General;  Laterality: Right;    DILATION AND CURETTAGE OF UTERUS  1986    DUPUYTREN CONTRACTURE RELEASE Bilateral 2017    HYSTERECTOMY      BEAU w/ appy, no BSO     INJECTION OF NEUROLYTIC AGENT AROUND LUMBAR SYMPATHETIC NERVE N/A 2022    Procedure: BLOCK, LUMBAR SYMPATHETIC;  Surgeon: Souleymane Rizo Jr., MD;  Location: [facility] PAIN MGT;  Service: Pain Management;  Laterality: N/A;  no pacemaker   pt is diabetic     INJECTION OF NEUROLYTIC AGENT AROUND LUMBAR SYMPATHETIC NERVE N/A 2022    Procedure: BLOCK, LUMBAR SYMPATHETIC;  Surgeon: Souleymane Rizo Jr., MD;  Location: [facility] PAIN MGT;  Service: Pain Management;  Laterality: N/A;  diabetic     INSERTION OF TUNNELED  CENTRAL VENOUS HEMODIALYSIS CATHETER Right 1/25/2023    Procedure: INSERTION, CATHETER, HEMODIALYSIS, DUAL LUMEN;  Surgeon: Manuel Javier MD;  Location: [facility] OR;  Service: General;  Laterality: Right;    INSERTION, CATHETER, TUNNELED Left 1/28/2023    Procedure: Insertion,catheter,tunneled;  Surgeon: Watson Fontenot MD;  Location: [facility] OR;  Service: General;  Laterality: Left;    LAPAROTOMY, EXPLORATORY N/A 1/14/2023    Procedure: LAPAROTOMY, EXPLORATORY;  Surgeon: Manuel Javier MD;  Location: [facility] OR;  Service: General;  Laterality: N/A;    LAPAROTOMY, EXPLORATORY N/A 1/16/2023    Procedure: LAPAROTOMY, EXPLORATORY;  Surgeon: Manuel Javier MD;  Location: [facility] OR;  Service: General;  Laterality: N/A;    REMOVAL OF CATHETER Right 1/28/2023    Procedure: REMOVAL, CATHETER;  Surgeon: Watson Fontenot MD;  Location: [facility] OR;  Service: General;  Laterality: Right;    REMOVAL, TUNNELED CATH Right 1/25/2023    Procedure: REMOVAL, TUNNELED CATH;  Surgeon: Manuel Javier MD;  Location: [facility] OR;  Service: General;  Laterality: Right;    ROBOTIC BRONCHOSCOPY N/A 10/20/2023    Procedure: ROBOTIC BRONCHOSCOPY;  Surgeon: Mayda Monzon MD;  Location: [address];  Service: Pulmonary;  Laterality: N/A;    SHOULDER SURGERY  2009 & 2010    right rotator cuff    TONSILLECTOMY      UPPER GASTROINTESTINAL ENDOSCOPY  04/2018       Family History   Problem Relation Age of Onset    Vision loss Father     Arthritis Father     Breast cancer Mother     Cancer Mother     Vision loss Mother     Hyperlipidemia Sister     Breast cancer Paternal Aunt     Cancer Paternal Aunt     Diabetes Paternal Grandfather     Vision loss Sister     Kidney disease Neg Hx        Social History     Socioeconomic History    Marital status:    Tobacco Use    Smoking status: Never    Smokeless tobacco: Never   Substance and Sexual Activity    Alcohol use: No    Drug use: Never    Sexual activity: Not Currently     Partners: Male      Birth control/protection: See Surgical Hx     Comment: :      Social Determinants of Health     Financial Resource Strain: Unknown (8/20/2023)    Overall Financial Resource Strain (CARDIA)     Difficulty of Paying Living Expenses: Patient refused   Food Insecurity: Unknown (8/20/2023)    Hunger Vital Sign     Worried About Running Out of Food in the Last Year: Patient refused     Ran Out of Food in the Last Year: Patient refused   Transportation Needs: Unknown (8/20/2023)    PRAPARE - Transportation     Lack of Transportation (Medical): Patient refused     Lack of Transportation (Non-Medical): Patient refused   Physical Activity: Inactive (8/20/2023)    Exercise Vital Sign     Days of Exercise per Week: 0 days     Minutes of Exercise per Session: 10 min   Stress: No Stress Concern Present (8/20/2023)    [employer] - Occupational Stress Questionnaire     Feeling of Stress : Not at all   Social Connections: Unknown (8/20/2023)    Social Connection and Isolation Panel [NHANES]     Frequency of Communication with Friends and Family: More than three times a week     Frequency of Social Gatherings with Friends and Family: More than three times a week     Active Member of Clubs or Organizations: Yes     Attends Club or Organization Meetings: More than 4 times per year     Marital Status:    Housing Stability: Unknown (8/20/2023)    Housing Stability Vital Sign     Unable to Pay for Housing in the Last Year: No     Number of Places Lived in the Last Year: 1     Unstable Housing in the Last Year: Patient refused       Current Outpatient Medications   Medication Sig Dispense Refill    atorvastatin (LIPITOR) 40 MG tablet TAKE 1 TABLET BY MOUTH ONCE DAILY 90 tablet 3    B complex-vitamin C-folic acid (MELLISSA-BENJY) 0.8 mg Tab Take 1 tablet (0.8 mg total) by mouth once daily. 30 tablet 3    betahistine HCl (BETAHISTINE, BULK, MISC)       blood sugar diagnostic (TRUE METRIX GLUCOSE TEST  STRIP) Strp USE ONE STRIP FOR TESTING 2 TIMES A  each 11    blood-glucose meter kit Use to test twice a day 1 each 0    calcium-vitamin D3 (OS-CIPRIANO 500 + D3) 500 mg-5 mcg (200 unit) per tablet Take 1 tablet by mouth once daily. 30 tablet 3    coenzyme Q10 100 mg capsule       cyanocobalamin 1,000 mcg/mL injection Inject 1 mL (1,000 mcg total) into the muscle every 30 days. 10 mL 3    dicyclomine (BENTYL) 10 MG capsule       dronabinoL (MARINOL) 5 MG capsule Take 1 capsule (5 mg total) by mouth 2 (two) times daily before meals. 60 capsule 1    econazole nitrate 1 % cream Apply topically 2 (two) times daily. 30 g 2    epoetin noble-epbx (RETACRIT) 10,000 unit/mL imjection Inject 1 mL (10,000 Units total) into the skin every Mon, Wed, Fri. HOLD IF HEMOGLOBIN is 10 or GREATER    DO NOT SHAKE  DO NOT DILUTE  DO NOT MIX with other drug solutions 12 mL 0    ferrous gluconate (FERGON) 324 MG tablet Take 1 tablet (324 mg total) by mouth daily with breakfast. 30 tablet 3    insulin aspart U-100 (NOVOLOG) 100 unit/mL (3 mL) InPn pen Inject before meals:  150-200=+1, 201-250=+2, 251-300=+3; 301-350=+4, over 350=+5 units 15 mL 0    lancets Misc 1 lancet by Misc.(Non-Drug; Combo Route) route 4 (four) times daily. 400 each 3    meclizine (ANTIVERT) 25 mg tablet Take 1 tablet (25 mg total) by mouth 3 (three) times daily as needed for Dizziness. 90 tablet 3    melatonin (MELATIN) 3 mg tablet Take 3 tablets (9 mg total) by mouth nightly as needed for Insomnia. 90 tablet 3    midodrine (PROAMATINE) 5 MG Tab Take 1 tablet (5 mg total) by mouth 2 (two) times daily as needed (if BP is systolic below 90 mmHg and patient is symptomatic. Also if BP is below 90 mmHg during dialysis). 60 tablet 11    mometasone (ELOCON) 0.1 % ointment Apply topically.      neomycin (MYCIFRADIN) 500 mg Tab       nutritional supplements Liqd Take 237 mLs by mouth 4 (four) times daily. 120 each 6    oxyCODONE (ROXICODONE) 5 MG immediate release tablet Take  "5 mg by mouth every 4 (four) hours as needed.      pantoprazole (PROTONIX) 40 MG tablet Take 1 tablet (40 mg total) by mouth once daily. 90 tablet 3    patiromer calcium sorbitex (VELTASSA) 8.4 gram PwPk Take 1 packet (8.4 g total) by mouth once daily. This medicine is used to treat your potassium level. Please take this medicine after dialysis on Tuesdays, Thursdays, and Saturdays. 30 packet 2    pen needle, diabetic (BD ULTRA-FINE MARIS PEN NEEDLE) 32 gauge x 5/32" Ndle 1 Device by Misc.(Non-Drug; Combo Route) route 4 (four) times daily with meals and nightly. 400 each 3    pregabalin (LYRICA) 150 MG capsule TAKE ONE CAPSULE BY MOUTH 2 TIMES A DAY 60 capsule 2    pregabalin (LYRICA) 75 MG capsule Take 1 capsule (75 mg total) by mouth Daily. Give after HD 30 capsule 0    sevelamer carbonate (RENVELA) 800 mg Tab Take 2 tablets (1,600 mg total) by mouth 3 (three) times daily with meals. DO NOT CRUSH OR CHEW; SWALLOW WHOLE. 180 tablet 11    simethicone (MYLICON) 125 mg Cap capsule Take by mouth.      simethicone (MYLICON) 80 MG chewable tablet Take 1 tablet (80 mg total) by mouth 4 (four) times daily with meals and nightly. 120 tablet 3    torsemide (DEMADEX) 20 MG Tab       vit C,H-Mh-ouagq-lutein-zeaxan (PRESERVISION AREDS 2) 145-118-19-1 mg-unit-mg-mg Cap       vitamins  A,C,E-zinc-copper 14,320-226-200 unit-mg-unit Cap Take 1 tablet by mouth once daily.       No current facility-administered medications for this visit.       Review of patient's allergies indicates:   Allergen Reactions    Crestor [rosuvastatin] Swelling    Gluten protein          Review of Systems   Constitutional: Negative for chills and fever.   Cardiovascular:  Negative for claudication and leg swelling.   Skin:  Positive for color change and nail changes. Negative for suspicious lesions.   Musculoskeletal:  Positive for joint pain. Negative for joint swelling, muscle cramps and muscle weakness.   Gastrointestinal:  Negative for nausea and " vomiting.   Neurological:  Positive for numbness and paresthesias.   Psychiatric/Behavioral:  Negative for altered mental status.            Objective:      Physical Exam  Constitutional:       General: She is not in acute distress.     Appearance: She is well-developed. She is not diaphoretic.   Cardiovascular:      Pulses:           Dorsalis pedis pulses are 2+ on the right side and 2+ on the left side.        Posterior tibial pulses are 1+ on the right side and 1+ on the left side.      Comments: CFT is < 3 seconds bilateral.  Pedal hair growth is decreased bilateral.  Mild non pitting lower extremity edema noted bilateral.  Toes are cool to touch bilateral.  Musculoskeletal:         General: Deformity present. No tenderness.      Comments: Muscle strength 5/5 in all muscle groups bilateral.   (-) for pain with palpation of bilateral foot and ankle. Semi-reducible contracture of toes 2-5 bilateral with adductovarus rotation of the 5th.   Skin:     General: Skin is warm and dry.      Capillary Refill: Capillary refill takes less than 2 seconds.      Coloration: Skin is not pale.      Findings: Lesion present. No abrasion, bruising, burn, ecchymosis, erythema, petechiae or rash.      Comments: Pedal skin appears thin bilateral.  Toenails x 10 appear thickened by 2 mm, elongated by 4 mm, and discolored with subungual debris.  Hyperkeratotic lesion noted to bilateral sub 5th met head.   Neurological:      Mental Status: She is alert and oriented to person, place, and time.      Sensory: Sensory deficit present.      Motor: No weakness or atrophy.      Comments: Protective sensation per [place] monofilament is decreased bilateral.  Vibratory sensation is decreased bilateral.  Light touch is intact bilateral.               Assessment:       Encounter Diagnoses   Name Primary?    Diabetic polyneuropathy associated with type 2 diabetes mellitus Yes    Hammer toes of both feet     Corn or callus     Onychomycosis  due to dermatophyte     Paresthesia of both feet            Plan:       Lily Kline was seen today for diabetic foot exam and nail care.    Diagnoses and all orders for this visit:    Diabetic polyneuropathy associated with type 2 diabetes mellitus  -     Ambulatory referral/consult to Pain Clinic; Future    Hammer toes of both feet    Corn or callus    Onychomycosis due to dermatophyte    Paresthesia of both feet  -     Ambulatory referral/consult to Pain Clinic; Future        I counseled the patient on her conditions, their implications and medical management.    To continue with daily application of CBD oil to the LEs.   Referral written for Pain Management to discuss Qutenza.      To continue wearing shoe gear that minimizes pressure to the digital deformities.    Shoe inspection. Diabetic Foot Education. Patient reminded of the importance of good nutrition and blood sugar control to help prevent podiatric complications of diabetes. Patient instructed on proper foot hygeine. We discussed wearing proper shoe gear, daily foot inspections, never walking without protective shoe gear, never putting sharp instruments to feet    With patient's permission, nails were aggressively reduced and debrided x 10 to their soft tissue attachment mechanically and with electric , removing all offending nail and debris.  Also, a sterile # 15 scalpel was used to trim lesions x 2 down to smooth appearing skin without incident.  Patient relates relief following the procedure. She will continue to monitor the areas daily, inspect her feet, wear protective shoe gear when ambulatory, moisturizer to maintain skin integrity and follow in this office in approximately 3 months, sooner p.r.n.    Follow up in about 3 months (around 2/3/2024).    Lucas Bryan DPM

## 2023-11-07 DIAGNOSIS — E87.5 HYPERKALEMIA: ICD-10-CM

## 2023-11-09 ENCOUNTER — TELEPHONE (OUTPATIENT)
Dept: PRIMARY CARE CLINIC | Facility: CLINIC | Age: 73
End: 2023-11-09
Payer: MEDICARE

## 2023-11-09 DIAGNOSIS — E78.2 HYPERLIPIDEMIA, MIXED: ICD-10-CM

## 2023-11-09 DIAGNOSIS — E11.42 TYPE 2 DIABETES MELLITUS WITH DIABETIC POLYNEUROPATHY, WITHOUT LONG-TERM CURRENT USE OF INSULIN: Primary | ICD-10-CM

## 2023-11-09 NOTE — TELEPHONE ENCOUNTER
Spoke to pt's  regarding his labs, pt's  wanted to know if pt is also due for any labs prior to 11/22 appt

## 2023-11-16 ENCOUNTER — TELEPHONE (OUTPATIENT)
Dept: NEPHROLOGY | Facility: CLINIC | Age: 73
End: 2023-11-16
Payer: MEDICARE

## 2023-11-17 ENCOUNTER — HOSPITAL ENCOUNTER (OUTPATIENT)
Dept: RADIOLOGY | Facility: HOSPITAL | Age: 73
Discharge: HOME | End: 2023-11-17
Attending: INTERNAL MEDICINE
Payer: MEDICARE

## 2023-11-17 PROCEDURE — 78815 NM PET CU64 DOTATATE, SKULL TO MID THIGH: ICD-10-PCS | Mod: 26,PS,, | Performed by: STUDENT IN AN ORGANIZED HEALTH CARE EDUCATION/TRAINING PROGRAM

## 2023-11-17 PROCEDURE — 78815 PET IMAGE W/CT SKULL-THIGH: CPT | Mod: TC,PS

## 2023-11-17 PROCEDURE — 78815 PET IMAGE W/CT SKULL-THIGH: CPT | Mod: 26,PS,, | Performed by: STUDENT IN AN ORGANIZED HEALTH CARE EDUCATION/TRAINING PROGRAM

## 2023-11-17 NOTE — PROGRESS NOTES
History & Physical    SUBJECTIVE:     History of Present Illness:  Patient is a 73 y.o. female never smoker with DDD, benign essential tremor, meniere's disease, HTN, HLD, DM2, GERD, Celiac's, IBS who is referred to clinic by Dr. Monzon, Pulmonology, for evaluation of RML Carcinoid tumor.  Chest CT 10/18/23 revealed 1.2 x 1.1 cm right middle lobe nodule, previously 1.2 x 1.0 cm in July 2023. Underwent Robotic Bronchoscopy 10/20/23; path: RML nodule positive for well-differentiated neuroendocrine neoplasm, carcinoid tumor. PET/CT Cu64 11/17/23 showed known RML nodule with hypermetabolic activity. Today patient reports she is doing well. Asymptomatic. Denies CP, palpitations, double vision, diaphoresis, dizziness, syncope, fever, chills, fatigue, SOB, cough, unintentional weight loss.     PSH: breast biopsy with tagging, appendectomy, colostomy closure (01/16/23), BEAU, D&C of uterus, Ex Lap x2 (01/14/23 & 01/16/23), R rotator cuff repair x2 (2009/2010), tonsillectomy, UGI endoscopy (2018)  Meds: torsemide, pregabalin, dronabinol, sevelamer    Chief Complaint   Patient presents with    Consult       Review of patient's allergies indicates:   Allergen Reactions    Crestor [rosuvastatin] Swelling    Gluten protein        Current Outpatient Medications   Medication Sig Dispense Refill    atorvastatin (LIPITOR) 40 MG tablet TAKE 1 TABLET BY MOUTH ONCE DAILY 90 tablet 3    B complex-vitamin C-folic acid (MELLISSA-BENJY) 0.8 mg Tab Take 1 tablet (0.8 mg total) by mouth once daily. 30 tablet 3    betahistine HCl (BETAHISTINE, BULK, MISC)       blood sugar diagnostic (TRUE METRIX GLUCOSE TEST STRIP) Strp USE ONE STRIP FOR TESTING 2 TIMES A  each 11    blood-glucose meter kit Use to test twice a day 1 each 0    calcium-vitamin D3 (OS-CIPRIANO 500 + D3) 500 mg-5 mcg (200 unit) per tablet Take 1 tablet by mouth once daily. 30 tablet 3    coenzyme Q10 100 mg capsule       cyanocobalamin 1,000 mcg/mL injection Inject 1 mL (1,000 mcg  "total) into the muscle every 30 days. 10 mL 3    dicyclomine (BENTYL) 10 MG capsule       dronabinoL (MARINOL) 5 MG capsule Take 1 capsule (5 mg total) by mouth 2 (two) times daily before meals. 60 capsule 1    econazole nitrate 1 % cream Apply topically 2 (two) times daily. 30 g 2    epoetin noble-epbx (RETACRIT) 10,000 unit/mL imjection Inject 1 mL (10,000 Units total) into the skin every Mon, Wed, Fri. HOLD IF HEMOGLOBIN is 10 or GREATER    DO NOT SHAKE  DO NOT DILUTE  DO NOT MIX with other drug solutions 12 mL 0    ferrous gluconate (FERGON) 324 MG tablet Take 1 tablet (324 mg total) by mouth daily with breakfast. 30 tablet 3    insulin aspart U-100 (NOVOLOG) 100 unit/mL (3 mL) InPn pen Inject before meals:  150-200=+1, 201-250=+2, 251-300=+3; 301-350=+4, over 350=+5 units 15 mL 0    lancets Misc 1 lancet by Misc.(Non-Drug; Combo Route) route 4 (four) times daily. 400 each 3    meclizine (ANTIVERT) 25 mg tablet Take 1 tablet (25 mg total) by mouth 3 (three) times daily as needed for Dizziness. 90 tablet 3    melatonin (MELATIN) 3 mg tablet Take 3 tablets (9 mg total) by mouth nightly as needed for Insomnia. 90 tablet 3    midodrine (PROAMATINE) 5 MG Tab Take 1 tablet (5 mg total) by mouth 2 (two) times daily as needed (if BP is systolic below 90 mmHg and patient is symptomatic. Also if BP is below 90 mmHg during dialysis). 60 tablet 11    mometasone (ELOCON) 0.1 % ointment Apply topically.      neomycin (MYCIFRADIN) 500 mg Tab       nutritional supplements Liqd Take 237 mLs by mouth 4 (four) times daily. 120 each 6    oxyCODONE (ROXICODONE) 5 MG immediate release tablet Take 5 mg by mouth every 4 (four) hours as needed.      pantoprazole (PROTONIX) 40 MG tablet Take 1 tablet (40 mg total) by mouth once daily. 90 tablet 3    patiromer calcium sorbitex (VELTASSA) 8.4 gram PwPk Take 2 packets (16.8 g total) by mouth once daily. 180 packet 2    pen needle, diabetic (BD ULTRA-FINE MARIS PEN NEEDLE) 32 gauge x 5/32" " Ndle 1 Device by Misc.(Non-Drug; Combo Route) route 4 (four) times daily with meals and nightly. 400 each 3    pregabalin (LYRICA) 150 MG capsule TAKE ONE CAPSULE BY MOUTH 2 TIMES A DAY 60 capsule 2    pregabalin (LYRICA) 75 MG capsule Take 1 capsule (75 mg total) by mouth Daily. Give after HD 30 capsule 0    sevelamer carbonate (RENVELA) 800 mg Tab Take 2 tablets (1,600 mg total) by mouth 3 (three) times daily with meals. DO NOT CRUSH OR CHEW; SWALLOW WHOLE. 180 tablet 11    simethicone (MYLICON) 125 mg Cap capsule Take by mouth.      simethicone (MYLICON) 80 MG chewable tablet Take 1 tablet (80 mg total) by mouth 4 (four) times daily with meals and nightly. 120 tablet 3    torsemide (DEMADEX) 20 MG Tab       vit C,U-Ml-wkjuh-lutein-zeaxan (PRESERVISION AREDS 2) 740-008-23-1 mg-unit-mg-mg Cap       vitamins  A,C,E-zinc-copper 14,320-226-200 unit-mg-unit Cap Take 1 tablet by mouth once daily.       No current facility-administered medications for this visit.       Past Medical History:   Diagnosis Date    Anxiety     Celiac disease 2018    Celiac disease     Depression     Diabetes mellitus     Family history of breast cancer in mother      at age 68    Hyperlipidemia     Hypertension     Meniere disease     Meniere's disease, unspecified ear     Menopause     Peptic ulcer     Reflux esophagitis     Urinary tract infection     Vaginal infection     Vaginal Pap smear 2014    Pap/Hpv Negative      Past Surgical History:   Procedure Laterality Date    APPENDECTOMY      BREAST SURGERY      Tags    CARPAL TUNNEL RELEASE Bilateral 2017    ,     CLOSURE, COLOSTOMY Left 2023    Procedure: CLOSURE, COLOSTOMY;  Surgeon: Manuel Javier MD;  Location: [facility] OR;  Service: General;  Laterality: Left;    COLONOSCOPY  2018    Normal  (Cornell Velazco)     COLOSTOMY Right 2023    Procedure: CREATION, COLOSTOMY;  Surgeon: Manuel Javier MD;  Location: [facility] OR;  Service: General;  Laterality:  Right;    DILATION AND CURETTAGE OF UTERUS  1986    DUPUYTREN CONTRACTURE RELEASE Bilateral 09/2017    HYSTERECTOMY  1987    BEAU w/ appy, no BSO     INJECTION OF NEUROLYTIC AGENT AROUND LUMBAR SYMPATHETIC NERVE N/A 1/6/2022    Procedure: BLOCK, LUMBAR SYMPATHETIC;  Surgeon: Souleymane Rizo Jr., MD;  Location: [facility];  Service: Pain Management;  Laterality: N/A;  no pacemaker   pt is diabetic     INJECTION OF NEUROLYTIC AGENT AROUND LUMBAR SYMPATHETIC NERVE N/A 8/25/2022    Procedure: BLOCK, LUMBAR SYMPATHETIC;  Surgeon: Souleymane Rizo Jr., MD;  Location: [facility];  Service: Pain Management;  Laterality: N/A;  diabetic     INSERTION OF TUNNELED CENTRAL VENOUS HEMODIALYSIS CATHETER Right 1/25/2023    Procedure: INSERTION, CATHETER, HEMODIALYSIS, DUAL LUMEN;  Surgeon: Manuel Javier MD;  Location: [facility] OR;  Service: General;  Laterality: Right;    INSERTION, CATHETER, TUNNELED Left 1/28/2023    Procedure: Insertion,catheter,tunneled;  Surgeon: Watson Fontenot MD;  Location: [facility] OR;  Service: General;  Laterality: Left;    LAPAROTOMY, EXPLORATORY N/A 1/14/2023    Procedure: LAPAROTOMY, EXPLORATORY;  Surgeon: Manuel Javier MD;  Location: [facility] OR;  Service: General;  Laterality: N/A;    LAPAROTOMY, EXPLORATORY N/A 1/16/2023    Procedure: LAPAROTOMY, EXPLORATORY;  Surgeon: Manuel Javier MD;  Location: [facility] OR;  Service: General;  Laterality: N/A;    REMOVAL OF CATHETER Right 1/28/2023    Procedure: REMOVAL, CATHETER;  Surgeon: Watson Fontenot MD;  Location: [facility] OR;  Service: General;  Laterality: Right;    REMOVAL, TUNNELED CATH Right 1/25/2023    Procedure: REMOVAL, TUNNELED CATH;  Surgeon: Manuel Javier MD;  Location: [facility] OR;  Service: General;  Laterality: Right;    ROBOTIC BRONCHOSCOPY N/A 10/20/2023    Procedure: ROBOTIC BRONCHOSCOPY;  Surgeon: Mayda Monzon MD;  Location: [address];  Service: Pulmonary;  Laterality: N/A;    SHOULDER SURGERY  2009 & 2010    right rotator  "cuff    TONSILLECTOMY      UPPER GASTROINTESTINAL ENDOSCOPY  04/2018     Family History   Problem Relation Age of Onset    Vision loss Father     Arthritis Father     Breast cancer Mother     Cancer Mother     Vision loss Mother     Hyperlipidemia Sister     Breast cancer Paternal Aunt     Cancer Paternal Aunt     Diabetes Paternal Grandfather     Vision loss Sister     Kidney disease Neg Hx      Social History     Tobacco Use    Smoking status: Never    Smokeless tobacco: Never   Substance Use Topics    Alcohol use: No    Drug use: Never        Review of Systems:  Review of Systems   Constitutional: Negative.  Negative for chills, diaphoresis, fatigue, fever and unexpected weight change.   Respiratory: Negative.  Negative for cough, shortness of breath, wheezing and stridor.    Cardiovascular: Negative.  Negative for chest pain, palpitations and leg swelling.   Skin: Negative.    Neurological: Negative.  Negative for dizziness, syncope, weakness and light-headedness.   Hematological: Negative.    Psychiatric/Behavioral: Negative.         OBJECTIVE:     Vital Signs (Most Recent)  Pulse: 67 (11/20/23 0857)  BP: 107/67 (11/20/23 0857)  SpO2: (!) 91 % (11/20/23 0857)  5' 5" (1.651 m)  57.7 kg (127 lb 3.3 oz)     Physical Exam:  Physical Exam  Constitutional:       Appearance: Normal appearance. She is normal weight.   Eyes:      Extraocular Movements: Extraocular movements intact.   Cardiovascular:      Rate and Rhythm: Normal rate and regular rhythm.      Pulses: Normal pulses.   Pulmonary:      Effort: Pulmonary effort is normal.      Breath sounds: Normal breath sounds.   Abdominal:      General: Abdomen is flat.      Palpations: Abdomen is soft.   Skin:     General: Skin is warm and dry.   Neurological:      General: No focal deficit present.      Mental Status: She is alert and oriented to person, place, and time. Mental status is at baseline.   Psychiatric:         Mood and Affect: Mood normal.         Behavior: " Behavior normal.         Thought Content: Thought content normal.         Diagnostic Results:    Chest CT 10/18/23:   Lungs and pleura:  *right upper lobe* solid nodule measuring 12 x 11 mm (series 3, image 239), previously measuring 12 x 10 mm.  *Left lower lobe 4 mm noncalcified nodule (series 3, image 309), previously measuring 3 mm.  *Right lower lobe peripheral 6 x 4 mm noncalcified nodule (series 3, image 278), previously measuring 6 x 3 mm.  *Left basal subsegmental atelectasis.  *Left posterior calcified granuloma.  *Bilateral mild bronchial wall thickening.  *Right basal medial passive atelectasis.  *Minimal bibasilar ground-glass opacities.  Mediastinum and barbie: No enlarged mediastinal or hilar adenopathy.  Several mediastinal subcentimeter nodes, may be reactive.    ECHO 01/2023  Summary    Concentric hypertrophy and  Normal right ventricular size with normal right ventricular systolic function.  The estimated ejection fraction is 35%.  Grade I left ventricular diastolic dysfunction.  Mild aortic regurgitation.  Mild mitral regurgitation.  Mild tricuspid regurgitation.  There is no pulmonary hypertension.      PET/CT Cu64 [place] 11/17/23: awaiting result    ASSESSMENT/PLAN:     Patient is a 73 y.o. female never smoker with DDD, benign essential tremor, meniere's disease, HTN, HLD, DM2, GERD, Celiac's, IBS who is referred to clinic by Dr. Monzon, Pulmonology, for evaluation of RML Carcinoid tumor.  Although her recent surgical history has been complicated she seems clinically well at this point. Her functional status does not seem to correspond to her previous ECHO which showed  EF of 35%    PLAN:Plan   F/u PFTs  Repeat Cardiac stress ECHO  If above are WNL will consider robotic middle lobectomy  Discuss in Thoracic Tumor Board 11/29/23 following above  Scheduled to see Dr. Meyer, Radiation Oncology, 11/27/23.

## 2023-11-20 ENCOUNTER — OFFICE VISIT (OUTPATIENT)
Dept: CARDIOTHORACIC SURGERY | Facility: CLINIC | Age: 73
End: 2023-11-20
Payer: MEDICARE

## 2023-11-20 VITALS
SYSTOLIC BLOOD PRESSURE: 107 MMHG | HEART RATE: 67 BPM | OXYGEN SATURATION: 91 % | BODY MASS INDEX: 21.19 KG/M2 | WEIGHT: 127.19 LBS | DIASTOLIC BLOOD PRESSURE: 67 MMHG | HEIGHT: 65 IN

## 2023-11-20 DIAGNOSIS — Z01.810 PRE-OPERATIVE CARDIOVASCULAR EXAMINATION: Primary | ICD-10-CM

## 2023-11-20 DIAGNOSIS — C7A.090 MALIGNANT CARCINOID TUMOR OF BRONCHUS: ICD-10-CM

## 2023-11-20 LAB — FUNGUS SPEC CULT: NORMAL

## 2023-11-20 PROCEDURE — 3066F NEPHROPATHY DOC TX: CPT | Mod: CPTII,S$GLB,, | Performed by: STUDENT IN AN ORGANIZED HEALTH CARE EDUCATION/TRAINING PROGRAM

## 2023-11-20 PROCEDURE — 3074F PR MOST RECENT SYSTOLIC BLOOD PRESSURE < 130 MM HG: ICD-10-PCS | Mod: CPTII,S$GLB,, | Performed by: STUDENT IN AN ORGANIZED HEALTH CARE EDUCATION/TRAINING PROGRAM

## 2023-11-20 PROCEDURE — 3288F PR FALLS RISK ASSESSMENT DOCUMENTED: ICD-10-PCS | Mod: CPTII,S$GLB,, | Performed by: STUDENT IN AN ORGANIZED HEALTH CARE EDUCATION/TRAINING PROGRAM

## 2023-11-20 PROCEDURE — 99999 PR PBB SHADOW E&M-EST. PATIENT-LVL V: ICD-10-PCS | Mod: PBBFAC,,, | Performed by: STUDENT IN AN ORGANIZED HEALTH CARE EDUCATION/TRAINING PROGRAM

## 2023-11-20 PROCEDURE — 3066F PR DOCUMENTATION OF TREATMENT FOR NEPHROPATHY: ICD-10-PCS | Mod: CPTII,S$GLB,, | Performed by: STUDENT IN AN ORGANIZED HEALTH CARE EDUCATION/TRAINING PROGRAM

## 2023-11-20 PROCEDURE — 3078F DIAST BP <80 MM HG: CPT | Mod: CPTII,S$GLB,, | Performed by: STUDENT IN AN ORGANIZED HEALTH CARE EDUCATION/TRAINING PROGRAM

## 2023-11-20 PROCEDURE — 3044F PR MOST RECENT HEMOGLOBIN A1C LEVEL <7.0%: ICD-10-PCS | Mod: CPTII,S$GLB,, | Performed by: STUDENT IN AN ORGANIZED HEALTH CARE EDUCATION/TRAINING PROGRAM

## 2023-11-20 PROCEDURE — 1126F PR PAIN SEVERITY QUANTIFIED, NO PAIN PRESENT: ICD-10-PCS | Mod: CPTII,S$GLB,, | Performed by: STUDENT IN AN ORGANIZED HEALTH CARE EDUCATION/TRAINING PROGRAM

## 2023-11-20 PROCEDURE — 3288F FALL RISK ASSESSMENT DOCD: CPT | Mod: CPTII,S$GLB,, | Performed by: STUDENT IN AN ORGANIZED HEALTH CARE EDUCATION/TRAINING PROGRAM

## 2023-11-20 PROCEDURE — 3008F PR BODY MASS INDEX (BMI) DOCUMENTED: ICD-10-PCS | Mod: CPTII,S$GLB,, | Performed by: STUDENT IN AN ORGANIZED HEALTH CARE EDUCATION/TRAINING PROGRAM

## 2023-11-20 PROCEDURE — 3078F PR MOST RECENT DIASTOLIC BLOOD PRESSURE < 80 MM HG: ICD-10-PCS | Mod: CPTII,S$GLB,, | Performed by: STUDENT IN AN ORGANIZED HEALTH CARE EDUCATION/TRAINING PROGRAM

## 2023-11-20 PROCEDURE — 99205 OFFICE O/P NEW HI 60 MIN: CPT | Mod: S$GLB,,, | Performed by: STUDENT IN AN ORGANIZED HEALTH CARE EDUCATION/TRAINING PROGRAM

## 2023-11-20 PROCEDURE — 3060F POS MICROALBUMINURIA REV: CPT | Mod: CPTII,S$GLB,, | Performed by: STUDENT IN AN ORGANIZED HEALTH CARE EDUCATION/TRAINING PROGRAM

## 2023-11-20 PROCEDURE — 3074F SYST BP LT 130 MM HG: CPT | Mod: CPTII,S$GLB,, | Performed by: STUDENT IN AN ORGANIZED HEALTH CARE EDUCATION/TRAINING PROGRAM

## 2023-11-20 PROCEDURE — 3008F BODY MASS INDEX DOCD: CPT | Mod: CPTII,S$GLB,, | Performed by: STUDENT IN AN ORGANIZED HEALTH CARE EDUCATION/TRAINING PROGRAM

## 2023-11-20 PROCEDURE — 3044F HG A1C LEVEL LT 7.0%: CPT | Mod: CPTII,S$GLB,, | Performed by: STUDENT IN AN ORGANIZED HEALTH CARE EDUCATION/TRAINING PROGRAM

## 2023-11-20 PROCEDURE — 99205 PR OFFICE/OUTPT VISIT, NEW, LEVL V, 60-74 MIN: ICD-10-PCS | Mod: S$GLB,,, | Performed by: STUDENT IN AN ORGANIZED HEALTH CARE EDUCATION/TRAINING PROGRAM

## 2023-11-20 PROCEDURE — 1101F PR PT FALLS ASSESS DOC 0-1 FALLS W/OUT INJ PAST YR: ICD-10-PCS | Mod: CPTII,S$GLB,, | Performed by: STUDENT IN AN ORGANIZED HEALTH CARE EDUCATION/TRAINING PROGRAM

## 2023-11-20 PROCEDURE — 1126F AMNT PAIN NOTED NONE PRSNT: CPT | Mod: CPTII,S$GLB,, | Performed by: STUDENT IN AN ORGANIZED HEALTH CARE EDUCATION/TRAINING PROGRAM

## 2023-11-20 PROCEDURE — 3060F PR POS MICROALBUMINURIA RESULT DOCUMENTED/REVIEW: ICD-10-PCS | Mod: CPTII,S$GLB,, | Performed by: STUDENT IN AN ORGANIZED HEALTH CARE EDUCATION/TRAINING PROGRAM

## 2023-11-20 PROCEDURE — 99999 PR PBB SHADOW E&M-EST. PATIENT-LVL V: CPT | Mod: PBBFAC,,, | Performed by: STUDENT IN AN ORGANIZED HEALTH CARE EDUCATION/TRAINING PROGRAM

## 2023-11-20 PROCEDURE — 1101F PT FALLS ASSESS-DOCD LE1/YR: CPT | Mod: CPTII,S$GLB,, | Performed by: STUDENT IN AN ORGANIZED HEALTH CARE EDUCATION/TRAINING PROGRAM

## 2023-11-22 ENCOUNTER — HOSPITAL ENCOUNTER (OUTPATIENT)
Dept: CARDIOLOGY | Facility: HOSPITAL | Age: 73
Discharge: HOME | End: 2023-11-22
Payer: MEDICARE

## 2023-11-22 ENCOUNTER — OFFICE VISIT (OUTPATIENT)
Dept: PRIMARY CARE CLINIC | Facility: CLINIC | Age: 73
End: 2023-11-22
Payer: MEDICARE

## 2023-11-22 VITALS
HEART RATE: 68 BPM | WEIGHT: 128.5 LBS | DIASTOLIC BLOOD PRESSURE: 58 MMHG | SYSTOLIC BLOOD PRESSURE: 120 MMHG | OXYGEN SATURATION: 98 % | HEIGHT: 65 IN | BODY MASS INDEX: 21.41 KG/M2

## 2023-11-22 VITALS
RESPIRATION RATE: 16 BRPM | HEIGHT: 65 IN | WEIGHT: 127 LBS | SYSTOLIC BLOOD PRESSURE: 126 MMHG | DIASTOLIC BLOOD PRESSURE: 73 MMHG | HEART RATE: 76 BPM | BODY MASS INDEX: 21.16 KG/M2

## 2023-11-22 DIAGNOSIS — E11.42 TYPE 2 DIABETES MELLITUS WITH DIABETIC POLYNEUROPATHY, WITHOUT LONG-TERM CURRENT USE OF INSULIN: Primary | ICD-10-CM

## 2023-11-22 DIAGNOSIS — R42 DIZZINESS: ICD-10-CM

## 2023-11-22 DIAGNOSIS — Z01.810 PRE-OPERATIVE CARDIOVASCULAR EXAMINATION: ICD-10-CM

## 2023-11-22 DIAGNOSIS — E78.2 HYPERLIPIDEMIA, MIXED: ICD-10-CM

## 2023-11-22 DIAGNOSIS — K21.9 GASTROESOPHAGEAL REFLUX DISEASE WITHOUT ESOPHAGITIS: ICD-10-CM

## 2023-11-22 DIAGNOSIS — R53.83 FATIGUE, UNSPECIFIED TYPE: ICD-10-CM

## 2023-11-22 PROBLEM — R57.9 SHOCK: Status: RESOLVED | Noted: 2023-01-15 | Resolved: 2023-11-22

## 2023-11-22 PROBLEM — Z78.9 PROBLEM WITH VASCULAR ACCESS: Status: RESOLVED | Noted: 2023-01-28 | Resolved: 2023-11-22

## 2023-11-22 LAB
CV STRESS BASE HR: 59 BPM
DIASTOLIC BLOOD PRESSURE: 72 MMHG
EJECTION FRACTION- HIGH: 59 %
END DIASTOLIC INDEX-HIGH: 155 ML/M2
END DIASTOLIC INDEX-LOW: 91 ML/M2
END SYSTOLIC INDEX-HIGH: 78 ML/M2
END SYSTOLIC INDEX-LOW: 40 ML/M2
OHS CV CPX 1 MINUTE RECOVERY HEART RATE: 87 BPM
OHS CV CPX 85 PERCENT MAX PREDICTED HEART RATE MALE: 125
OHS CV CPX MAX PREDICTED HEART RATE: 147
OHS CV CPX PATIENT IS FEMALE: 1
OHS CV CPX PATIENT IS MALE: 0
OHS CV CPX PEAK DIASTOLIC BLOOD PRESSURE: 73 MMHG
OHS CV CPX PEAK HEAR RATE: 59 BPM
OHS CV CPX PEAK RATE PRESSURE PRODUCT: 7375
OHS CV CPX PEAK SYSTOLIC BLOOD PRESSURE: 125 MMHG
OHS CV CPX PERCENT MAX PREDICTED HEART RATE ACHIEVED: 42
OHS CV CPX RATE PRESSURE PRODUCT PRESENTING: 7080
RETIRED EF AND QEF - SEE NOTES: 47 %
SYSTOLIC BLOOD PRESSURE: 120 MMHG

## 2023-11-22 PROCEDURE — 93016 NUCLEAR STRESS - CARDIOLOGY INTERPRETED (CUPID ONLY): ICD-10-PCS | Mod: ,,, | Performed by: INTERNAL MEDICINE

## 2023-11-22 PROCEDURE — 3074F SYST BP LT 130 MM HG: CPT | Mod: CPTII,S$GLB,, | Performed by: INTERNAL MEDICINE

## 2023-11-22 PROCEDURE — 3074F PR MOST RECENT SYSTOLIC BLOOD PRESSURE < 130 MM HG: ICD-10-PCS | Mod: CPTII,S$GLB,, | Performed by: INTERNAL MEDICINE

## 2023-11-22 PROCEDURE — 3060F PR POS MICROALBUMINURIA RESULT DOCUMENTED/REVIEW: ICD-10-PCS | Mod: CPTII,S$GLB,, | Performed by: INTERNAL MEDICINE

## 2023-11-22 PROCEDURE — 3066F NEPHROPATHY DOC TX: CPT | Mod: CPTII,S$GLB,, | Performed by: INTERNAL MEDICINE

## 2023-11-22 PROCEDURE — 63600175 PHARM REV CODE 636 W HCPCS

## 2023-11-22 PROCEDURE — 99999 PR PBB SHADOW E&M-EST. PATIENT-LVL V: ICD-10-PCS | Mod: PBBFAC,,, | Performed by: INTERNAL MEDICINE

## 2023-11-22 PROCEDURE — 99214 PR OFFICE/OUTPT VISIT, EST, LEVL IV, 30-39 MIN: ICD-10-PCS | Mod: S$GLB,,, | Performed by: INTERNAL MEDICINE

## 2023-11-22 PROCEDURE — 99999 PR PBB SHADOW E&M-EST. PATIENT-LVL V: CPT | Mod: PBBFAC,,, | Performed by: INTERNAL MEDICINE

## 2023-11-22 PROCEDURE — 99214 OFFICE O/P EST MOD 30 MIN: CPT | Mod: S$GLB,,, | Performed by: INTERNAL MEDICINE

## 2023-11-22 PROCEDURE — A9502 TC99M TETROFOSMIN: HCPCS

## 2023-11-22 PROCEDURE — 93017 CV STRESS TEST TRACING ONLY: CPT

## 2023-11-22 PROCEDURE — 3008F PR BODY MASS INDEX (BMI) DOCUMENTED: ICD-10-PCS | Mod: CPTII,S$GLB,, | Performed by: INTERNAL MEDICINE

## 2023-11-22 PROCEDURE — 93018 CV STRESS TEST I&R ONLY: CPT | Mod: ,,, | Performed by: INTERNAL MEDICINE

## 2023-11-22 PROCEDURE — 3060F POS MICROALBUMINURIA REV: CPT | Mod: CPTII,S$GLB,, | Performed by: INTERNAL MEDICINE

## 2023-11-22 PROCEDURE — 3008F BODY MASS INDEX DOCD: CPT | Mod: CPTII,S$GLB,, | Performed by: INTERNAL MEDICINE

## 2023-11-22 PROCEDURE — 78452 NUCLEAR STRESS - CARDIOLOGY INTERPRETED (CUPID ONLY): ICD-10-PCS | Mod: 26,,, | Performed by: INTERNAL MEDICINE

## 2023-11-22 PROCEDURE — 93018 NUCLEAR STRESS - CARDIOLOGY INTERPRETED (CUPID ONLY): ICD-10-PCS | Mod: ,,, | Performed by: INTERNAL MEDICINE

## 2023-11-22 PROCEDURE — 93016 CV STRESS TEST SUPVJ ONLY: CPT | Mod: ,,, | Performed by: INTERNAL MEDICINE

## 2023-11-22 PROCEDURE — 3078F PR MOST RECENT DIASTOLIC BLOOD PRESSURE < 80 MM HG: ICD-10-PCS | Mod: CPTII,S$GLB,, | Performed by: INTERNAL MEDICINE

## 2023-11-22 PROCEDURE — 3066F PR DOCUMENTATION OF TREATMENT FOR NEPHROPATHY: ICD-10-PCS | Mod: CPTII,S$GLB,, | Performed by: INTERNAL MEDICINE

## 2023-11-22 PROCEDURE — 78452 HT MUSCLE IMAGE SPECT MULT: CPT | Mod: 26,,, | Performed by: INTERNAL MEDICINE

## 2023-11-22 PROCEDURE — 3078F DIAST BP <80 MM HG: CPT | Mod: CPTII,S$GLB,, | Performed by: INTERNAL MEDICINE

## 2023-11-22 PROCEDURE — 1126F PR PAIN SEVERITY QUANTIFIED, NO PAIN PRESENT: ICD-10-PCS | Mod: CPTII,S$GLB,, | Performed by: INTERNAL MEDICINE

## 2023-11-22 PROCEDURE — 3044F PR MOST RECENT HEMOGLOBIN A1C LEVEL <7.0%: ICD-10-PCS | Mod: CPTII,S$GLB,, | Performed by: INTERNAL MEDICINE

## 2023-11-22 PROCEDURE — 1126F AMNT PAIN NOTED NONE PRSNT: CPT | Mod: CPTII,S$GLB,, | Performed by: INTERNAL MEDICINE

## 2023-11-22 PROCEDURE — 3044F HG A1C LEVEL LT 7.0%: CPT | Mod: CPTII,S$GLB,, | Performed by: INTERNAL MEDICINE

## 2023-11-22 RX ORDER — REGADENOSON 0.08 MG/ML
0.4 INJECTION, SOLUTION INTRAVENOUS
Status: COMPLETED | OUTPATIENT
Start: 2023-11-22 | End: 2023-11-22

## 2023-11-22 RX ORDER — ATORVASTATIN CALCIUM 40 MG/1
40 TABLET, FILM COATED ORAL DAILY
Qty: 90 TABLET | Refills: 3 | Status: ON HOLD | OUTPATIENT
Start: 2023-11-22 | End: 2024-02-22

## 2023-11-22 RX ORDER — MECLIZINE HYDROCHLORIDE 25 MG/1
25 TABLET ORAL 3 TIMES DAILY PRN
Qty: 90 TABLET | Refills: 3 | Status: SHIPPED | OUTPATIENT
Start: 2023-11-22

## 2023-11-22 RX ORDER — AMINOPHYLLINE 25 MG/ML
75 INJECTION, SOLUTION INTRAVENOUS
Status: COMPLETED | OUTPATIENT
Start: 2023-11-22 | End: 2023-11-22

## 2023-11-22 RX ADMIN — AMINOPHYLLINE 75 MG: 25 INJECTION, SOLUTION INTRAVENOUS at 08:11

## 2023-11-22 RX ADMIN — REGADENOSON 0.4 MG: 0.08 INJECTION, SOLUTION INTRAVENOUS at 08:11

## 2023-11-22 NOTE — PROGRESS NOTES
[facility] Note    Chief Complaint      Chief Complaint   Patient presents with    Diabetes     3 month        History of Present Illness      Lily Green is a 73 y.o. female with chronic conditions of DM2 with neuropathy, Carcinoid tumor of the lung, HLD, GERD, celiac disease, Meniere's disease, depression, insomnia, chronic neck pain who presents today for: follow up chronic conditions.    Last visit, seen for:  1/13/2023, pt had episode of septic shock due to peritonitis and ischemic bowel with necrosis, ARF due to hypovolemia, shock liver.  Pt required total colectomy and ileostomy, transfusion of blood, initiation of hemodialysis.  Transitioned to LTAC and discharged 3/2023.  Since discharge, pt has followed up with [facility], Dr. Calvert, nephrology, Dr. Vyas, endo NP Josefina, pulm, Dr. Monzon. Currently on HD 3x/week.  Undergoing evaluation for ileostomy reversal and anastomosis.     Since last visit, has had ileostomy reversal and anastomosis without complications.  Has established with Dr. Monzon, pulmonology, and Dr. Thomson, CT surgery to evaluate RML nodule seen on CT, biopsy and path showing well-differentiated neuroendocrine neoplasm, carcinoid tumor.  Has appt to see Dr. Meyer, Rad Onc for evaluation as well.    Complains of peripheral neuropathy symptoms in the feet impairing sleep.  Has been seeing podiatry, Dr. Bryan, who is recommending pain management evaluation.      DM2 with neuropathy: Sees endo AZUL Rocha.  Previously controlled on metformin prior to ARF, transitioned to insulin.  Lantus 4u AM, 3u PM, novolog SSI.  A1C 4.9. Eye exam UTD with Dr. Rivera 12/2020.  Foot exam UTD with Dr. Bryan previously.  HLD: Controlled on lipitor.  LDL 64, stable from last check.    ESRD: Sees Dr. Vyas, nephrology.  On midodrine to help support BP levels while on HD.    GERD: Controlled on pantoprazole.  No new or worsening symptoms.  Depression: Controlled on effexor.  No  new or worsening symptoms  Insomnia: Controlled on restoril.  Control is satisfactory on this regimen.  Chronic neck pain 2/2 cervical degenerative disc disease: Sees Dr. Rizo but will need. No new exacerbations.  Off gabapentin 2/2 no perceived benefit.  Using combination of vitamin supplement and topical neuropathy treatment for feet.  Had lumbar sympathetic nerve block which was effective for a few months.  Plans to follow back up for another attempt.   Celiac disease, IBS: previously saw Dr. Amor.  Avoiding triggers to avoid exacerbations.  Doing well with diet control. Saw Dr. Hutton who started on dicyclomine which has helped.  Taking miralax.    Meniere's disease: Controlled  Flu shot missed last season.  TdAP 2015.  Prevnar UTD, pneumovax UTD.  Shingrix discussed.  COVID vaccine UTD, due for booster.  Mammogram scheduled in 2 weeks with Dr. Serrano.  DEXA , normal.  Cscope , Dr. Amor, no polyps, 5 yr interval.     Past Medical History:  Past Medical History:   Diagnosis Date    Anxiety     Celiac disease 2018    Celiac disease     Depression     Diabetes mellitus     Family history of breast cancer in mother      at age 68    Hyperlipidemia     Hypertension     Meniere disease     Meniere's disease, unspecified ear     Menopause     Peptic ulcer     Reflux esophagitis     Urinary tract infection     Vaginal infection     Vaginal Pap smear 2014    Pap/Hpv Negative        Past Surgical History:   has a past surgical history that includes Tonsillectomy; Appendectomy; Dilation and curettage of uterus (); Hysterectomy (); Carpal tunnel release (Bilateral, 2017); Shoulder surgery ( & ); Colonoscopy (2018); Upper gastrointestinal endoscopy (2018); Dupuytren contracture release (Bilateral, 2017); Breast surgery; Injection of neurolytic agent around lumbar sympathetic nerve (N/A, 2022); Injection of neurolytic agent around lumbar sympathetic nerve  (N/A, 8/25/2022); laparotomy, exploratory (N/A, 1/14/2023); laparotomy, exploratory (N/A, 1/16/2023); closure, colostomy (Left, 1/16/2023); Colostomy (Right, 1/16/2023); Insertion of tunneled central venous hemodialysis catheter (Right, 1/25/2023); removal, tunneled cath (Right, 1/25/2023); insertion, catheter, tunneled (Left, 1/28/2023); Removal of catheter (Right, 1/28/2023); and robotic bronchoscopy (N/A, 10/20/2023).    Family History:  family history includes Arthritis in her father; Breast cancer in her mother and paternal aunt; Cancer in her mother and paternal aunt; Diabetes in her paternal grandfather; Hyperlipidemia in her sister; Vision loss in her father, mother, and sister.     Social History:  Social History     Tobacco Use    Smoking status: Never    Smokeless tobacco: Never   Substance Use Topics    Alcohol use: No    Drug use: Never       I personally reviewed all past medical, surgical, social and family history.    Review of Systems   Constitutional:  Negative for chills, fever and malaise/fatigue.   Respiratory:  Negative for shortness of breath.    Cardiovascular:  Negative for chest pain.   Gastrointestinal:  Negative for constipation, diarrhea, nausea and vomiting.   Skin:  Negative for rash.   Neurological:  Negative for weakness.   All other systems reviewed and are negative.       Medications:  Outpatient Encounter Medications as of 11/22/2023   Medication Sig Dispense Refill    atorvastatin (LIPITOR) 40 MG tablet Take 1 tablet (40 mg total) by mouth once daily. 90 tablet 3    B complex-vitamin C-folic acid (MELLISSA-BENJY) 0.8 mg Tab Take 1 tablet (0.8 mg total) by mouth once daily. 30 tablet 3    betahistine HCl (BETAHISTINE, BULK, MISC)       blood sugar diagnostic (TRUE METRIX GLUCOSE TEST STRIP) Strp USE ONE STRIP FOR TESTING 2 TIMES A  each 11    blood-glucose meter kit Use to test twice a day 1 each 0    calcium-vitamin D3 (OS-CIPRIANO 500 + D3) 500 mg-5 mcg (200 unit) per tablet Take 1  tablet by mouth once daily. 30 tablet 3    coenzyme Q10 100 mg capsule       dicyclomine (BENTYL) 10 MG capsule       dronabinoL (MARINOL) 5 MG capsule Take 1 capsule (5 mg total) by mouth 2 (two) times daily before meals. 60 capsule 1    econazole nitrate 1 % cream Apply topically 2 (two) times daily. 30 g 2    epoetin noble-epbx (RETACRIT) 10,000 unit/mL imjection Inject 1 mL (10,000 Units total) into the skin every Mon, Wed, Fri. HOLD IF HEMOGLOBIN is 10 or GREATER    DO NOT SHAKE  DO NOT DILUTE  DO NOT MIX with other drug solutions 12 mL 0    ferrous gluconate (FERGON) 324 MG tablet Take 1 tablet (324 mg total) by mouth daily with breakfast. 30 tablet 3    insulin aspart U-100 (NOVOLOG) 100 unit/mL (3 mL) InPn pen Inject before meals:  150-200=+1, 201-250=+2, 251-300=+3; 301-350=+4, over 350=+5 units 15 mL 0    lancets Misc 1 lancet by Misc.(Non-Drug; Combo Route) route 4 (four) times daily. 400 each 3    meclizine (ANTIVERT) 25 mg tablet Take 1 tablet (25 mg total) by mouth 3 (three) times daily as needed for Dizziness. 90 tablet 3    melatonin (MELATIN) 3 mg tablet Take 3 tablets (9 mg total) by mouth nightly as needed for Insomnia. 90 tablet 3    midodrine (PROAMATINE) 5 MG Tab Take 1 tablet (5 mg total) by mouth 2 (two) times daily as needed (if BP is systolic below 90 mmHg and patient is symptomatic. Also if BP is below 90 mmHg during dialysis). 60 tablet 11    mometasone (ELOCON) 0.1 % ointment Apply topically.      neomycin (MYCIFRADIN) 500 mg Tab       nutritional supplements Liqd Take 237 mLs by mouth 4 (four) times daily. 120 each 6    oxyCODONE (ROXICODONE) 5 MG immediate release tablet Take 5 mg by mouth every 4 (four) hours as needed.      pantoprazole (PROTONIX) 40 MG tablet Take 1 tablet (40 mg total) by mouth once daily. 90 tablet 3    patiromer calcium sorbitex (VELTASSA) 8.4 gram PwPk Take 2 packets (16.8 g total) by mouth once daily. 180 packet 2    pen needle, diabetic (BD ULTRA-FINE MARIS PEN  "NEEDLE) 32 gauge x 5/32" Ndle 1 Device by Misc.(Non-Drug; Combo Route) route 4 (four) times daily with meals and nightly. 400 each 3    pregabalin (LYRICA) 150 MG capsule TAKE ONE CAPSULE BY MOUTH 2 TIMES A DAY 60 capsule 2    pregabalin (LYRICA) 75 MG capsule Take 1 capsule (75 mg total) by mouth Daily. Give after HD 30 capsule 0    simethicone (MYLICON) 125 mg Cap capsule Take by mouth.      simethicone (MYLICON) 80 MG chewable tablet Take 1 tablet (80 mg total) by mouth 4 (four) times daily with meals and nightly. 120 tablet 3    torsemide (DEMADEX) 20 MG Tab       vit C,E-Ek-tjxvk-lutein-zeaxan (PRESERVISION AREDS 2) 219-476-05-1 mg-unit-mg-mg Cap       vitamins  A,C,E-zinc-copper [phone] unit-mg-unit Cap Take 1 tablet by mouth once daily.      [DISCONTINUED] atorvastatin (LIPITOR) 40 MG tablet TAKE 1 TABLET BY MOUTH ONCE DAILY 90 tablet 3    [DISCONTINUED] cyanocobalamin 1,000 mcg/mL injection Inject 1 mL (1,000 mcg total) into the muscle every 30 days. 10 mL 3    [DISCONTINUED] meclizine (ANTIVERT) 25 mg tablet Take 1 tablet (25 mg total) by mouth 3 (three) times daily as needed for Dizziness. 90 tablet 3    [DISCONTINUED] patiromer calcium sorbitex (VELTASSA) 16.8 gram PwPk Take by mouth.      [DISCONTINUED] patiromer calcium sorbitex (VELTASSA) 8.4 gram PwPk Take 1 packet (8.4 g total) by mouth 2 (two) times a day. 60 packet 2    [DISCONTINUED] patiromer calcium sorbitex (VELTASSA) 8.4 gram PwPk Take 1 packet (8.4 g total) by mouth once daily. This medicine is used to treat your potassium level. Please take this medicine after dialysis on , , and . 30 packet 2    [DISCONTINUED] sevelamer carbonate (RENVELA) 800 mg Tab Take 2 tablets (1,600 mg total) by mouth 3 (three) times daily with meals. DO NOT CRUSH OR CHEW; SWALLOW WHOLE. 180 tablet 11    [] aminophylline injection 75 mg       [] regadenoson injection 0.4 mg        No facility-administered encounter " "medications on file as of 11/22/2023.       Allergies:  Review of patient's allergies indicates:   Allergen Reactions    Crestor [rosuvastatin] Swelling    Gluten protein        Health Maintenance:  Immunization History   Administered Date(s) Administered    COVID-19, MRNA, LN-S, PF (Pfizer) (Purple Cap) 02/13/2021, 03/06/2021    Influenza 11/30/2012    Influenza (FLUAD) - Quadrivalent - Adjuvanted - PF *Preferred* (65+) 10/03/2020    Influenza Split 10/15/2019    Pneumococcal Conjugate - 13 Valent 04/27/2018    Pneumococcal Polysaccharide - 23 Valent 10/14/2019      Health Maintenance   Topic Date Due    Hepatitis C Screening  Never done    TETANUS VACCINE  Never done    Shingles Vaccine (1 of 2) Never done    Eye Exam  12/12/2023    Mammogram  12/28/2023    Hemoglobin A1c  05/15/2024    Foot Exam  05/31/2024    Lipid Panel  11/15/2024    DEXA Scan  01/03/2026        Physical Exam      Vital Signs  Pulse: 68  SpO2: 98 %  BP: (!) 120/58  BP Location: Right arm  Patient Position: Sitting  Pain Score: 0-No pain  Height and Weight  Height: 5' 5" (165.1 cm)  Weight: 58.3 kg (128 lb 8.5 oz)  BSA (Calculated - sq m): 1.64 sq meters  BMI (Calculated): 21.4  Weight in (lb) to have BMI = 25: 149.9]    Physical Exam  Vitals reviewed.   Constitutional:       Appearance: She is well-developed.   HENT:      Head: Normocephalic and atraumatic.      Right Ear: External ear normal.      Left Ear: External ear normal.   Cardiovascular:      Rate and Rhythm: Normal rate and regular rhythm.      Heart sounds: Normal heart sounds. No murmur heard.  Pulmonary:      Effort: Pulmonary effort is normal.      Breath sounds: Normal breath sounds. No wheezing or rales.   Abdominal:      General: Bowel sounds are normal. There is no distension.      Palpations: Abdomen is soft.      Tenderness: There is no abdominal tenderness.          Laboratory:  CBC:  Recent Labs   Lab 03/01/23  0518 03/03/23  0515 03/06/23  0556   WBC 6.64 8.82 8.55   RBC " 2.98 L 3.12 L 3.24 L   Hemoglobin 8.4 L 9.1 L 9.4 L   Hematocrit 28.2 L 28.6 L 30.9 L   Platelets 399 319 335   MCV 95 92 95   MCH 28.2 29.2 29.0   MCHC 29.8 L 31.8 L 30.4 L     CMP:  Recent Labs   Lab 03/03/23  0515 03/06/23  0556 10/13/23  1144 11/15/23  0802   Glucose 179 H 98  --  91   Calcium 10.0 10.2   < > 9.6   Albumin 3.1 L  3.1 L 3.1 L  3.1 L  --  4.3   Total Protein 8.9 H 8.9 H  --  8.5 H   Sodium 123 L 128 L   < > 138   Potassium 4.6 6.2 H   < > 5.1   CO2 19 L 16 L  --  34 H   Carbon Dioxide  --   --    < >  --    Chloride 90 L 91 L   < > 90 L   BUN 96 H 129 H  --  34 H   Blood Urea Nitrogen  --   --    < >  --    Alkaline Phosphatase 147 H 136 H  --  92   ALT 13 14  --  25   AST 17 17  --  31   Total Bilirubin 0.3 0.5  --  1.2 H    < > = values in this interval not displayed.     URINALYSIS:  Recent Labs   Lab 12/23/20  1344 10/11/21  1457 08/03/23  1055 09/27/23  0948   Color, UA Yellow   < > Yellow Yellow   Clarity, UA Clear  --   --   --    Specific Gravity, UA  --    < > 1.015 1.010   Spec Grav UA 1.020  --   --   --    pH, UA 5.0   < > 6.0 >8.0 A   Protein, UA  --    < > 3+ A 1+ A   Bacteria  --    < > Many A None   Nitrite, UA -   < > Negative Negative   Leukocytes, UA  --    < > 3+ A Negative   Urobilinogen, UA 0.2   < > Negative  --    Hyaline Casts, UA  --   --  0 0    < > = values in this interval not displayed.      LIPIDS:  Recent Labs   Lab 04/22/22  0910 11/01/22  0901 01/13/23  2348 01/14/23  1247 11/15/23  0802   TSH  --   --  1.821  --   --    HDL 52 54  --   --  45   Cholesterol 138 149  --   --  150   Triglycerides 108 90  --  96 201 H   LDL Cholesterol 64.4 77.0  --   --  64.8   HDL/Cholesterol Ratio 37.7 36.2  --   --  30.0   Non-HDL Cholesterol 86 95  --   --  105   Total Cholesterol/HDL Ratio 2.7 2.8  --   --  3.3     TSH:  Recent Labs   Lab 01/13/23  2348   TSH 1.821     A1C:  Recent Labs   Lab 04/20/21  0754 10/19/21  0907 04/22/22  0910 11/01/22  0901 07/11/23  0707  11/15/23  0802   Hemoglobin A1C 6.1 H 6.2 H 6.0 H 5.8 H 5.3 4.9       Assessment/Plan     Lily Green is a 73 y.o.female with:    1. Type 2 diabetes mellitus with diabetic polyneuropathy, without long-term current use of insulin  - Ambulatory referral/consult to Pain Clinic; Future  Continue current meds.    2. Fatigue, unspecified type  - TSH; Future    3. Hyperlipidemia, mixed  - atorvastatin (LIPITOR) 40 MG tablet; Take 1 tablet (40 mg total) by mouth once daily.  Dispense: 90 tablet; Refill: 3  Continue current meds.    4. Dizziness  - meclizine (ANTIVERT) 25 mg tablet; Take 1 tablet (25 mg total) by mouth 3 (three) times daily as needed for Dizziness.  Dispense: 90 tablet; Refill: 3    5. Gastroesophageal reflux disease without esophagitis  - meclizine (ANTIVERT) 25 mg tablet; Take 1 tablet (25 mg total) by mouth 3 (three) times daily as needed for Dizziness.  Dispense: 90 tablet; Refill: 3       Chronic conditions status updated as per HPI.  Other than changes above, cont current medications and maintain follow up with specialists.  Follow up in about 6 months (around 5/22/2024) for Follow up visit.    Future Appointments   Date Time Provider Department Center   12/20/2023  2:30 PM Asad Verduzco MD [facility] PAINMA [place]   1/3/2024 10:00 AM Sherine Rocha NP [facility] ENDOCN [facility]   2/9/2024 10:20 AM Lucas Bryan DPM [facility] POD [place]   5/29/2024  9:30 AM Pavel Calixto MD [facility] PRIDANA Calixto MD  [facility]

## 2023-11-24 ENCOUNTER — HOSPITAL ENCOUNTER (OUTPATIENT)
Dept: PULMONOLOGY | Facility: CLINIC | Age: 73
Discharge: HOME | End: 2023-11-24
Payer: MEDICARE

## 2023-11-24 DIAGNOSIS — C7A.090 MALIGNANT CARCINOID TUMOR OF BRONCHUS: ICD-10-CM

## 2023-11-24 PROCEDURE — 94727 GAS DIL/WSHOT DETER LNG VOL: CPT | Mod: S$GLB,,, | Performed by: INTERNAL MEDICINE

## 2023-11-24 PROCEDURE — 94060 EVALUATION OF WHEEZING: CPT | Mod: S$GLB,,, | Performed by: INTERNAL MEDICINE

## 2023-11-24 PROCEDURE — 94729 DIFFUSING CAPACITY: CPT | Mod: S$GLB,,, | Performed by: INTERNAL MEDICINE

## 2023-11-24 PROCEDURE — 94727 PR PULM FUNCTION TEST BY GAS: ICD-10-PCS | Mod: S$GLB,,, | Performed by: INTERNAL MEDICINE

## 2023-11-24 PROCEDURE — 94729 PR C02/MEMBANE DIFFUSE CAPACITY: ICD-10-PCS | Mod: S$GLB,,, | Performed by: INTERNAL MEDICINE

## 2023-11-24 PROCEDURE — 94060 PR EVAL OF BRONCHOSPASM: ICD-10-PCS | Mod: S$GLB,,, | Performed by: INTERNAL MEDICINE

## 2023-11-26 DIAGNOSIS — E83.39 HYPERPHOSPHATEMIA ASSOCIATED WITH RENAL FAILURE: ICD-10-CM

## 2023-11-26 DIAGNOSIS — N19 HYPERPHOSPHATEMIA ASSOCIATED WITH RENAL FAILURE: ICD-10-CM

## 2023-11-26 RX ORDER — SEVELAMER HYDROCHLORIDE 800 MG/1
1600 TABLET, FILM COATED ORAL
Qty: 180 TABLET | Refills: 11 | Status: ON HOLD | OUTPATIENT
Start: 2023-11-26 | End: 2024-03-29 | Stop reason: HOSPADM

## 2023-11-27 ENCOUNTER — OFFICE VISIT (OUTPATIENT)
Dept: RADIATION ONCOLOGY | Facility: CLINIC | Age: 73
End: 2023-11-27
Payer: MEDICARE

## 2023-11-27 VITALS
BODY MASS INDEX: 21.38 KG/M2 | TEMPERATURE: 97 F | HEIGHT: 65 IN | DIASTOLIC BLOOD PRESSURE: 54 MMHG | WEIGHT: 128.31 LBS | SYSTOLIC BLOOD PRESSURE: 109 MMHG | RESPIRATION RATE: 17 BRPM | OXYGEN SATURATION: 95 % | HEART RATE: 67 BPM

## 2023-11-27 DIAGNOSIS — C7A.090 MALIGNANT CARCINOID TUMOR OF BRONCHUS: ICD-10-CM

## 2023-11-27 PROCEDURE — 1159F MED LIST DOCD IN RCRD: CPT | Mod: CPTII,S$GLB,, | Performed by: RADIOLOGY

## 2023-11-27 PROCEDURE — 3066F PR DOCUMENTATION OF TREATMENT FOR NEPHROPATHY: ICD-10-PCS | Mod: CPTII,S$GLB,, | Performed by: RADIOLOGY

## 2023-11-27 PROCEDURE — 3044F PR MOST RECENT HEMOGLOBIN A1C LEVEL <7.0%: ICD-10-PCS | Mod: CPTII,S$GLB,, | Performed by: RADIOLOGY

## 2023-11-27 PROCEDURE — 3078F PR MOST RECENT DIASTOLIC BLOOD PRESSURE < 80 MM HG: ICD-10-PCS | Mod: CPTII,S$GLB,, | Performed by: RADIOLOGY

## 2023-11-27 PROCEDURE — 1101F PR PT FALLS ASSESS DOC 0-1 FALLS W/OUT INJ PAST YR: ICD-10-PCS | Mod: CPTII,S$GLB,, | Performed by: RADIOLOGY

## 2023-11-27 PROCEDURE — 3074F SYST BP LT 130 MM HG: CPT | Mod: CPTII,S$GLB,, | Performed by: RADIOLOGY

## 2023-11-27 PROCEDURE — 3288F PR FALLS RISK ASSESSMENT DOCUMENTED: ICD-10-PCS | Mod: CPTII,S$GLB,, | Performed by: RADIOLOGY

## 2023-11-27 PROCEDURE — 3288F FALL RISK ASSESSMENT DOCD: CPT | Mod: CPTII,S$GLB,, | Performed by: RADIOLOGY

## 2023-11-27 PROCEDURE — 1126F AMNT PAIN NOTED NONE PRSNT: CPT | Mod: CPTII,S$GLB,, | Performed by: RADIOLOGY

## 2023-11-27 PROCEDURE — 1101F PT FALLS ASSESS-DOCD LE1/YR: CPT | Mod: CPTII,S$GLB,, | Performed by: RADIOLOGY

## 2023-11-27 PROCEDURE — 1159F PR MEDICATION LIST DOCUMENTED IN MEDICAL RECORD: ICD-10-PCS | Mod: CPTII,S$GLB,, | Performed by: RADIOLOGY

## 2023-11-27 PROCEDURE — 99999 PR PBB SHADOW E&M-EST. PATIENT-LVL V: ICD-10-PCS | Mod: PBBFAC,,, | Performed by: RADIOLOGY

## 2023-11-27 PROCEDURE — 3060F PR POS MICROALBUMINURIA RESULT DOCUMENTED/REVIEW: ICD-10-PCS | Mod: CPTII,S$GLB,, | Performed by: RADIOLOGY

## 2023-11-27 PROCEDURE — 3060F POS MICROALBUMINURIA REV: CPT | Mod: CPTII,S$GLB,, | Performed by: RADIOLOGY

## 2023-11-27 PROCEDURE — 99205 OFFICE O/P NEW HI 60 MIN: CPT | Mod: S$GLB,,, | Performed by: RADIOLOGY

## 2023-11-27 PROCEDURE — 3044F HG A1C LEVEL LT 7.0%: CPT | Mod: CPTII,S$GLB,, | Performed by: RADIOLOGY

## 2023-11-27 PROCEDURE — 3074F PR MOST RECENT SYSTOLIC BLOOD PRESSURE < 130 MM HG: ICD-10-PCS | Mod: CPTII,S$GLB,, | Performed by: RADIOLOGY

## 2023-11-27 PROCEDURE — 99205 PR OFFICE/OUTPT VISIT, NEW, LEVL V, 60-74 MIN: ICD-10-PCS | Mod: S$GLB,,, | Performed by: RADIOLOGY

## 2023-11-27 PROCEDURE — 1126F PR PAIN SEVERITY QUANTIFIED, NO PAIN PRESENT: ICD-10-PCS | Mod: CPTII,S$GLB,, | Performed by: RADIOLOGY

## 2023-11-27 PROCEDURE — 3008F BODY MASS INDEX DOCD: CPT | Mod: CPTII,S$GLB,, | Performed by: RADIOLOGY

## 2023-11-27 PROCEDURE — 99999 PR PBB SHADOW E&M-EST. PATIENT-LVL V: CPT | Mod: PBBFAC,,, | Performed by: RADIOLOGY

## 2023-11-27 PROCEDURE — 3066F NEPHROPATHY DOC TX: CPT | Mod: CPTII,S$GLB,, | Performed by: RADIOLOGY

## 2023-11-27 PROCEDURE — 3008F PR BODY MASS INDEX (BMI) DOCUMENTED: ICD-10-PCS | Mod: CPTII,S$GLB,, | Performed by: RADIOLOGY

## 2023-11-27 PROCEDURE — 3078F DIAST BP <80 MM HG: CPT | Mod: CPTII,S$GLB,, | Performed by: RADIOLOGY

## 2023-11-27 NOTE — PROGRESS NOTES
11/27/2023    Radiation Oncology Consultation    Assessment   This is a 73 y.o. 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 FDG and Cu64-dotatate PET scans without evidence of regional/distant metastatic disease. She is referred for consideration of treatment options.    I discussed treatment options for early stage pulmonary carcinoid including surgical resection and stereotactic body radiotherapy (SBRT). Resection is the preferred treatment option for patients who are considered medically operable, which is decided by the Thoracic Surgeon. If resection would carry high risk for morbidity, I recommend treating the RML primary to 54 Gy in 3 fractions, which offers local control in 90% of patients. Potential short- and long-term risks of treatment were reviewed with the patient, particularly pneumonitis and chest wall pain.              Plan   1) Will review her case at Thoracic Multidisciplinary Conference on Wednesday 11/29 for final recommendation of surgery vs SBRT.        CHIEF COMPLAINT: Pulmonary carcinoid    HPI/Focused ROS: Ms. Green is a 72 y/o female non-smoker who underwent CT A/P in January 2023 for bowel perforation. She was incidentally noted to have pulmonary nodule and referred to Dr. Monzon in 7/2023. CT Chest 7/28/23 demonstrated a 1.0 cm RML nodule. FDG PET 10/9/23 with mild uptake in the RML nodule. She underwent bronchoscopy 10/20/23 with biopsy of RML nodule revealing well-differentiated neuroendocrine tumor. Cu64-dotatate PET 11/17/23 with increased SUV of 21 in the RML nodule. No other evidence of disease. She has met with Dr. Herrera and has PFTs last week.     In clinic today, she is accompanied by her  and a sitter. She denies dyspnea or chest pain.     Is the patient female between ages 15-55:  no    Does the patient have a CIED:  no    Prior Radiation History: None      Past Medical History:   Diagnosis Date    Anxiety     Celiac  disease 2018    Celiac disease     Depression     Diabetes mellitus     Family history of breast cancer in mother      at age 68    Hyperlipidemia     Hypertension     Meniere disease     Meniere's disease, unspecified ear     Menopause     Peptic ulcer     Reflux esophagitis     Urinary tract infection     Vaginal infection     Vaginal Pap smear 2014    Pap/Hpv Negative        Past Surgical History:   Procedure Laterality Date    APPENDECTOMY      BREAST SURGERY      Tags    CARPAL TUNNEL RELEASE Bilateral 2017    ,     CLOSURE, COLOSTOMY Left 2023    Procedure: CLOSURE, COLOSTOMY;  Surgeon: Manuel Javier MD;  Location: [facility] OR;  Service: General;  Laterality: Left;    COLONOSCOPY  2018    Normal  ( Juan A)     COLOSTOMY Right 2023    Procedure: CREATION, COLOSTOMY;  Surgeon: Manuel Javier MD;  Location: [facility] OR;  Service: General;  Laterality: Right;    DILATION AND CURETTAGE OF UTERUS  1986    DUPUYTREN CONTRACTURE RELEASE Bilateral 2017    HYSTERECTOMY  1987    BEAU w/ appy, no BSO     INJECTION OF NEUROLYTIC AGENT AROUND LUMBAR SYMPATHETIC NERVE N/A 2022    Procedure: BLOCK, LUMBAR SYMPATHETIC;  Surgeon: Souleymane Rizo Jr., MD;  Location: [facility] PAIN [facility]GT;  Service: Pain Management;  Laterality: N/A;  no pacemaker   pt is diabetic     INJECTION OF NEUROLYTIC AGENT AROUND LUMBAR SYMPATHETIC NERVE N/A 2022    Procedure: BLOCK, LUMBAR SYMPATHETIC;  Surgeon: Souleymane Rizo Jr., MD;  Location: [facility] PAIN [facility]GT;  Service: Pain Management;  Laterality: N/A;  diabetic     INSERTION OF TUNNELED CENTRAL VENOUS HEMODIALYSIS CATHETER Right 2023    Procedure: INSERTION, CATHETER, HEMODIALYSIS, DUAL LUMEN;  Surgeon: Manuel Javier MD;  Location: [facility] OR;  Service: General;  Laterality: Right;    INSERTION, CATHETER, TUNNELED Left 2023    Procedure: Insertion,catheter,tunneled;  Surgeon: Watson Fontenot MD;  Location: [facility] OR;  Service: General;   Laterality: Left;    LAPAROTOMY, EXPLORATORY N/A 1/14/2023    Procedure: LAPAROTOMY, EXPLORATORY;  Surgeon: Manuel Javier MD;  Location: [facility] OR;  Service: General;  Laterality: N/A;    LAPAROTOMY, EXPLORATORY N/A 1/16/2023    Procedure: LAPAROTOMY, EXPLORATORY;  Surgeon: Manuel Javier MD;  Location: [facility] OR;  Service: General;  Laterality: N/A;    REMOVAL OF CATHETER Right 1/28/2023    Procedure: REMOVAL, CATHETER;  Surgeon: Watson Fontenot MD;  Location: [facility] OR;  Service: General;  Laterality: Right;    REMOVAL, TUNNELED CATH Right 1/25/2023    Procedure: REMOVAL, TUNNELED CATH;  Surgeon: Manuel Javier MD;  Location: [facility] OR;  Service: General;  Laterality: Right;    ROBOTIC BRONCHOSCOPY N/A 10/20/2023    Procedure: ROBOTIC BRONCHOSCOPY;  Surgeon: Mayda Monzon MD;  Location: [address];  Service: Pulmonary;  Laterality: N/A;    SHOULDER SURGERY  2009 & 2010    right rotator cuff    TONSILLECTOMY      UPPER GASTROINTESTINAL ENDOSCOPY  04/2018       Social History     Tobacco Use    Smoking status: Never    Smokeless tobacco: Never   Substance Use Topics    Alcohol use: No    Drug use: Never       Cancer-related family history includes Breast cancer in her mother and paternal aunt; Cancer in her mother and paternal aunt.    Current Outpatient Medications on File Prior to Visit   Medication Sig Dispense Refill    atorvastatin (LIPITOR) 40 MG tablet Take 1 tablet (40 mg total) by mouth once daily. 90 tablet 3    B complex-vitamin C-folic acid (MELLISSA-BENJY) 0.8 mg Tab Take 1 tablet (0.8 mg total) by mouth once daily. 30 tablet 3    betahistine HCl (BETAHISTINE, BULK, MISC)       blood sugar diagnostic (TRUE METRIX GLUCOSE TEST STRIP) Strp USE ONE STRIP FOR TESTING 2 TIMES A  each 11    blood-glucose meter kit Use to test twice a day 1 each 0    calcium-vitamin D3 (OS-CIPRIANO 500 + D3) 500 mg-5 mcg (200 unit) per tablet Take 1 tablet by mouth once daily. 30 tablet 3    coenzyme Q10 100 mg capsule   "     dicyclomine (BENTYL) 10 MG capsule       dronabinoL (MARINOL) 5 MG capsule Take 1 capsule (5 mg total) by mouth 2 (two) times daily before meals. 60 capsule 1    econazole nitrate 1 % cream Apply topically 2 (two) times daily. 30 g 2    epoetin noble-epbx (RETACRIT) 10,000 unit/mL imjection Inject 1 mL (10,000 Units total) into the skin every Mon, Wed, Fri. HOLD IF HEMOGLOBIN is 10 or GREATER    DO NOT SHAKE  DO NOT DILUTE  DO NOT MIX with other drug solutions 12 mL 0    ferrous gluconate (FERGON) 324 MG tablet Take 1 tablet (324 mg total) by mouth daily with breakfast. 30 tablet 3    insulin aspart U-100 (NOVOLOG) 100 unit/mL (3 mL) InPn pen Inject before meals:  150-200=+1, 201-250=+2, 251-300=+3; 301-350=+4, over 350=+5 units 15 mL 0    lancets Misc 1 lancet by Misc.(Non-Drug; Combo Route) route 4 (four) times daily. 400 each 3    meclizine (ANTIVERT) 25 mg tablet Take 1 tablet (25 mg total) by mouth 3 (three) times daily as needed for Dizziness. 90 tablet 3    melatonin (MELATIN) 3 mg tablet Take 3 tablets (9 mg total) by mouth nightly as needed for Insomnia. 90 tablet 3    midodrine (PROAMATINE) 5 MG Tab Take 1 tablet (5 mg total) by mouth 2 (two) times daily as needed (if BP is systolic below 90 mmHg and patient is symptomatic. Also if BP is below 90 mmHg during dialysis). 60 tablet 11    mometasone (ELOCON) 0.1 % ointment Apply topically.      neomycin (MYCIFRADIN) 500 mg Tab       nutritional supplements Liqd Take 237 mLs by mouth 4 (four) times daily. 120 each 6    oxyCODONE (ROXICODONE) 5 MG immediate release tablet Take 5 mg by mouth every 4 (four) hours as needed.      pantoprazole (PROTONIX) 40 MG tablet Take 1 tablet (40 mg total) by mouth once daily. 90 tablet 3    patiromer calcium sorbitex (VELTASSA) 8.4 gram PwPk Take 2 packets (16.8 g total) by mouth once daily. 180 packet 2    pen needle, diabetic (BD ULTRA-FINE MARIS PEN NEEDLE) 32 gauge x 5/32" Ndle 1 Device by Misc.(Non-Drug; Combo Route) " "route 4 (four) times daily with meals and nightly. 400 each 3    pregabalin (LYRICA) 150 MG capsule TAKE ONE CAPSULE BY MOUTH 2 TIMES A DAY 60 capsule 2    pregabalin (LYRICA) 75 MG capsule Take 1 capsule (75 mg total) by mouth Daily. Give after HD 30 capsule 0    sevelamer HCL (RENAGEL) 800 MG Tab Take 2 tablets (1,600 mg total) by mouth 3 (three) times daily with meals. 180 tablet 11    simethicone (MYLICON) 125 mg Cap capsule Take by mouth.      simethicone (MYLICON) 80 MG chewable tablet Take 1 tablet (80 mg total) by mouth 4 (four) times daily with meals and nightly. 120 tablet 3    torsemide (DEMADEX) 20 MG Tab       vit C,Y-Lm-plrtj-lutein-zeaxan (PRESERVISION AREDS 2) 124-165-02-1 mg-unit-mg-mg Cap       vitamins  A,C,E-zinc-copper 14,320-226-200 unit-mg-unit Cap Take 1 tablet by mouth once daily.       No current facility-administered medications on file prior to visit.       Review of patient's allergies indicates:   Allergen Reactions    Crestor [rosuvastatin] Swelling    Gluten protein          Vital Signs: BP (!) 109/54 (BP Location: Right arm, Patient Position: Sitting)   Pulse 67   Temp 97.2 °F (36.2 °C)   Resp 17   Ht 5' 5" (1.651 m)   Wt 58.2 kg (128 lb 4.9 oz)   LMP  (LMP Unknown) Comment: BEAU/ NO BSO  1986  SpO2 95%   BMI 21.35 kg/m²     ECOG Performance Status: (1) Restricted in physically strenuous activity, ambulatory and able to do work of light nature    Physical Exam  Constitutional:       Appearance: Normal appearance.   HENT:      Head: Normocephalic and atraumatic.   Eyes:      General: No scleral icterus.     Extraocular Movements: Extraocular movements intact.   Pulmonary:      Effort: No accessory muscle usage or respiratory distress.   Musculoskeletal:      Cervical back: Normal range of motion.   Neurological:      Mental Status: She is alert and oriented to person, place, and time.   Psychiatric:         Mood and Affect: Mood and affect normal.         Judgment: Judgment " normal.          Labs:    Imaging: I have personally reviewed the patient's available images and reports and summarized pertinent findings above in HPI.     Pathology: I have personally reviewed the patient's available pathology and summarized pertinent findings above in HPI.       - Thank you for allowing me to participate in the care of your patient.    Zachariah Meyer MD, PhD

## 2023-11-29 DIAGNOSIS — C7A.090 MALIGNANT CARCINOID TUMOR OF BRONCHUS: Primary | ICD-10-CM

## 2023-12-11 ENCOUNTER — PATIENT MESSAGE (OUTPATIENT)
Dept: CARDIOTHORACIC SURGERY | Facility: CLINIC | Age: 73
End: 2023-12-11
Payer: MEDICARE

## 2023-12-13 LAB
LEFT EYE DM RETINOPATHY: NEGATIVE
RIGHT EYE DM RETINOPATHY: NEGATIVE

## 2023-12-14 ENCOUNTER — PATIENT OUTREACH (OUTPATIENT)
Dept: ADMINISTRATIVE | Facility: HOSPITAL | Age: 73
End: 2023-12-14
Payer: MEDICARE

## 2023-12-20 ENCOUNTER — TELEPHONE (OUTPATIENT)
Dept: PAIN MEDICINE | Facility: CLINIC | Age: 73
End: 2023-12-20

## 2023-12-20 ENCOUNTER — OFFICE VISIT (OUTPATIENT)
Dept: PAIN MEDICINE | Facility: CLINIC | Age: 73
End: 2023-12-20
Payer: MEDICARE

## 2023-12-20 VITALS
HEART RATE: 61 BPM | WEIGHT: 132.69 LBS | DIASTOLIC BLOOD PRESSURE: 69 MMHG | SYSTOLIC BLOOD PRESSURE: 114 MMHG | BODY MASS INDEX: 22.09 KG/M2

## 2023-12-20 DIAGNOSIS — E11.42 TYPE 2 DIABETES MELLITUS WITH DIABETIC POLYNEUROPATHY, WITHOUT LONG-TERM CURRENT USE OF INSULIN: ICD-10-CM

## 2023-12-20 DIAGNOSIS — E11.42 TYPE 2 DIABETES MELLITUS WITH DIABETIC POLYNEUROPATHY, WITHOUT LONG-TERM CURRENT USE OF INSULIN: Primary | ICD-10-CM

## 2023-12-20 PROCEDURE — 3044F PR MOST RECENT HEMOGLOBIN A1C LEVEL <7.0%: ICD-10-PCS | Mod: CPTII,S$GLB,, | Performed by: EMERGENCY MEDICINE

## 2023-12-20 PROCEDURE — 99204 OFFICE O/P NEW MOD 45 MIN: CPT | Mod: S$GLB,,, | Performed by: EMERGENCY MEDICINE

## 2023-12-20 PROCEDURE — 1100F PTFALLS ASSESS-DOCD GE2>/YR: CPT | Mod: CPTII,S$GLB,, | Performed by: EMERGENCY MEDICINE

## 2023-12-20 PROCEDURE — 3078F DIAST BP <80 MM HG: CPT | Mod: CPTII,S$GLB,, | Performed by: EMERGENCY MEDICINE

## 2023-12-20 PROCEDURE — 3008F PR BODY MASS INDEX (BMI) DOCUMENTED: ICD-10-PCS | Mod: CPTII,S$GLB,, | Performed by: EMERGENCY MEDICINE

## 2023-12-20 PROCEDURE — 3066F NEPHROPATHY DOC TX: CPT | Mod: CPTII,S$GLB,, | Performed by: EMERGENCY MEDICINE

## 2023-12-20 PROCEDURE — 3074F PR MOST RECENT SYSTOLIC BLOOD PRESSURE < 130 MM HG: ICD-10-PCS | Mod: CPTII,S$GLB,, | Performed by: EMERGENCY MEDICINE

## 2023-12-20 PROCEDURE — 3060F POS MICROALBUMINURIA REV: CPT | Mod: CPTII,S$GLB,, | Performed by: EMERGENCY MEDICINE

## 2023-12-20 PROCEDURE — 1125F PR PAIN SEVERITY QUANTIFIED, PAIN PRESENT: ICD-10-PCS | Mod: CPTII,S$GLB,, | Performed by: EMERGENCY MEDICINE

## 2023-12-20 PROCEDURE — 3060F PR POS MICROALBUMINURIA RESULT DOCUMENTED/REVIEW: ICD-10-PCS | Mod: CPTII,S$GLB,, | Performed by: EMERGENCY MEDICINE

## 2023-12-20 PROCEDURE — 99999 PR PBB SHADOW E&M-EST. PATIENT-LVL II: CPT | Mod: PBBFAC,,, | Performed by: EMERGENCY MEDICINE

## 2023-12-20 PROCEDURE — 3078F PR MOST RECENT DIASTOLIC BLOOD PRESSURE < 80 MM HG: ICD-10-PCS | Mod: CPTII,S$GLB,, | Performed by: EMERGENCY MEDICINE

## 2023-12-20 PROCEDURE — 3066F PR DOCUMENTATION OF TREATMENT FOR NEPHROPATHY: ICD-10-PCS | Mod: CPTII,S$GLB,, | Performed by: EMERGENCY MEDICINE

## 2023-12-20 PROCEDURE — 3288F PR FALLS RISK ASSESSMENT DOCUMENTED: ICD-10-PCS | Mod: CPTII,S$GLB,, | Performed by: EMERGENCY MEDICINE

## 2023-12-20 PROCEDURE — 3008F BODY MASS INDEX DOCD: CPT | Mod: CPTII,S$GLB,, | Performed by: EMERGENCY MEDICINE

## 2023-12-20 PROCEDURE — 3074F SYST BP LT 130 MM HG: CPT | Mod: CPTII,S$GLB,, | Performed by: EMERGENCY MEDICINE

## 2023-12-20 PROCEDURE — 3288F FALL RISK ASSESSMENT DOCD: CPT | Mod: CPTII,S$GLB,, | Performed by: EMERGENCY MEDICINE

## 2023-12-20 PROCEDURE — 99204 PR OFFICE/OUTPT VISIT, NEW, LEVL IV, 45-59 MIN: ICD-10-PCS | Mod: S$GLB,,, | Performed by: EMERGENCY MEDICINE

## 2023-12-20 PROCEDURE — 99999 PR PBB SHADOW E&M-EST. PATIENT-LVL II: ICD-10-PCS | Mod: PBBFAC,,, | Performed by: EMERGENCY MEDICINE

## 2023-12-20 PROCEDURE — 3044F HG A1C LEVEL LT 7.0%: CPT | Mod: CPTII,S$GLB,, | Performed by: EMERGENCY MEDICINE

## 2023-12-20 PROCEDURE — 1125F AMNT PAIN NOTED PAIN PRSNT: CPT | Mod: CPTII,S$GLB,, | Performed by: EMERGENCY MEDICINE

## 2023-12-20 PROCEDURE — 1100F PR PT FALLS ASSESS DOC 2+ FALLS/FALL W/INJURY/YR: ICD-10-PCS | Mod: CPTII,S$GLB,, | Performed by: EMERGENCY MEDICINE

## 2023-12-20 NOTE — PROGRESS NOTES
This note was completed with dictation software and grammatical errors may exist.    PCP: Pavel Calixto MD    Chief Complaint   Patient presents with    Foot Pain     Interval History 12/20/23:   The pain is located in the bilateral feet and is axial in nature. The pain is described as burning and tingling. Exacerbating factors: carpet and wearing socks. Mitigating factors cold smooth surfaces. Symptoms interfere with sleep and daily activity. The patient feels like symptoms have been unchanged. Patient had BL LSB in August of 22 that worked for 2 days and Dr. Rizo recommended SCS. She was seen again in 01/05/23 and the decision was made to move forward, but then patient had emergent surgery and has had significant medical issues since then.     PAIN SCORES:  Best: Pain is 0  Current: Pain is 2  Worst: Pain is 8    Interval Updates:  1/5/2023  Patient returns to clinic to discuss Spinal Cord Stimulator for burning, tingling, and aching in her feet.  She previously had a lumbar sympathetic block that provided her with 2 days of relief and then the symptoms returned.  She wishes to proceed with the SCS.  She has not been evaluated by Psychology for SCS placement.      Interval Updates:  9/8/2022 -  Norma returns to clinic s/p Lumbar sympathetic block on 8/25 with 100% relief for one day.  She reports burning, tingling and Aching of the both feet (location) pain rated 3/10 today with a weekly range of 3-4/10.       Interval Updates:   7/6/2022   Norma returns to clinic s/p TPI on 6/1/22 with 80% relief.  She reports a pain intensity 2/10 today with a weekly range of 2-3/10.        Interval Updates:   06/01/2022 - Ms. Green is following up for bilateral foot pain and mediation refill..  She requests a refill of Lyrica (pregabalin) which are working well to control Her pain.  She reports ?% relief with the current medication regimen.  Medication side effects: none.  Pain is currently rate 3/10 with a weekly  range 3-4/10.  It is described as Aching, Tingling and Numb.   Pain is worsened by: daily activity and improved by: massage.  She notes 70-80% relief of bilateral foot pain since the bilateral lumbar sympathetic block 1/6/22 lasting up until May 2022.  During that time she reports using none of the topical medications she was previously using, but has now restarted.     12/16/21 - Ms. Green is following up for bilateral foot pain. Pain is currently rate 2/10 with a weekly range 2-4/10.  Patient continues to complain of bilateral foot pain she was previously started on Lyrica 25 mg t.i.d. which she continues to take she reports no adverse side effects however she reports no significant relief with this medication.  Subjective:   Lily Green is a 72 y.o. female who has a past medical history of Anxiety, Celiac disease, Celiac disease, Depression, Diabetes mellitus, Family history of breast cancer in mother, Hyperlipidemia, Hypertension, Meniere disease, Menopause, Peptic ulcer, Reflux esophagitis, Urinary tract infection, Vaginal infection, and Vaginal Pap smear. She complains of pain as described below.     Location: bilateral feet   Onset: pain presents for several years but pain has been moderate to severe for about a year; especially bad since Oct 2021  Radiation: None  Timing: persistent  Current Pain Score: 2/10  Weekly Pain Range: 2-3/10  Quality: Burning, Tingling and Numb  Worsened by: walking for more than 10 minutes  Improved by: medications  Limitations: walking, crossing legs           12/20/2023     2:31 PM   Last 3 PDI Scores   Pain Disability Index (PDI) 14     Previous Therapies:  PT/OT:   HEP:   TENS:     Interventions:   08/25/22 Lumbar Sympathetic Block, bilateral 100% relief one day   06/01/22 Right periscapular TPI 80%   01/06/22 Bilat LSB with 70-80% relief for 5 months     Treatments / Medications: (Ice/Heat/NSAIDS/APAP/etc):  Oxycodone 5mg q6hrs prn - no longer taking, was for a  surgery  Lyrica 150mg qday  Gabapentin 600mg BID - did not relieve her pain  Duloxetine - off after her surgery    Antiplatelets/Anticoagulants:  No    Interventional Pain Procedures: (Previous injections)  08/25/22 LSB Bilaterally    IMAGING:      Past Surgical History:   Procedure Laterality Date    APPENDECTOMY      BREAST SURGERY      Tags    CARPAL TUNNEL RELEASE Bilateral 09/2017    ,     CLOSURE, COLOSTOMY Left 1/16/2023    Procedure: CLOSURE, COLOSTOMY;  Surgeon: Manuel Javier MD;  Location: [facility] OR;  Service: General;  Laterality: Left;    COLONOSCOPY  04/2018    Normal  ( Juan A)     COLOSTOMY Right 1/16/2023    Procedure: CREATION, COLOSTOMY;  Surgeon: Manuel Javier MD;  Location: [facility] OR;  Service: General;  Laterality: Right;    DILATION AND CURETTAGE OF UTERUS  1986    DUPUYTREN CONTRACTURE RELEASE Bilateral 09/2017    HYSTERECTOMY  1987    BEAU w/ appy, no BSO     INJECTION OF NEUROLYTIC AGENT AROUND LUMBAR SYMPATHETIC NERVE N/A 1/6/2022    Procedure: BLOCK, LUMBAR SYMPATHETIC;  Surgeon: Souleymane Rioz Jr., MD;  Location: [facility] PAIN [facility]GT;  Service: Pain Management;  Laterality: N/A;  no pacemaker   pt is diabetic     INJECTION OF NEUROLYTIC AGENT AROUND LUMBAR SYMPATHETIC NERVE N/A 8/25/2022    Procedure: BLOCK, LUMBAR SYMPATHETIC;  Surgeon: Souleymane Rizo Jr., MD;  Location: [facility]IN [facility]GT;  Service: Pain Management;  Laterality: N/A;  diabetic     INSERTION OF TUNNELED CENTRAL VENOUS HEMODIALYSIS CATHETER Right 1/25/2023    Procedure: INSERTION, CATHETER, HEMODIALYSIS, DUAL LUMEN;  Surgeon: Manuel Javier MD;  Location: [facility] OR;  Service: General;  Laterality: Right;    INSERTION, CATHETER, TUNNELED Left 1/28/2023    Procedure: Insertion,catheter,tunneled;  Surgeon: Watson Fontenot MD;  Location: [facility] OR;  Service: General;  Laterality: Left;    LAPAROTOMY, EXPLORATORY N/A 1/14/2023    Procedure: LAPAROTOMY, EXPLORATORY;  Surgeon: Manuel Javier MD;  Location: [facility] OR;   Service: General;  Laterality: N/A;    LAPAROTOMY, EXPLORATORY N/A 1/16/2023    Procedure: LAPAROTOMY, EXPLORATORY;  Surgeon: Manuel Javier MD;  Location: [facility] OR;  Service: General;  Laterality: N/A;    REMOVAL OF CATHETER Right 1/28/2023    Procedure: REMOVAL, CATHETER;  Surgeon: Watson Fontenot MD;  Location: [facility] OR;  Service: General;  Laterality: Right;    REMOVAL, TUNNELED CATH Right 1/25/2023    Procedure: REMOVAL, TUNNELED CATH;  Surgeon: Manuel Javier MD;  Location: [facility] OR;  Service: General;  Laterality: Right;    ROBOTIC BRONCHOSCOPY N/A 10/20/2023    Procedure: ROBOTIC BRONCHOSCOPY;  Surgeon: Mayda Monzon MD;  Location: [address];  Service: Pulmonary;  Laterality: N/A;    SHOULDER SURGERY  2009 & 2010    right rotator cuff    TONSILLECTOMY      UPPER GASTROINTESTINAL ENDOSCOPY  04/2018       Social History     Socioeconomic History    Marital status:    Tobacco Use    Smoking status: Never    Smokeless tobacco: Never   Substance and Sexual Activity    Alcohol use: No    Drug use: Never    Sexual activity: Not Currently     Partners: Male     Birth control/protection: See Surgical Hx     Comment: :      Social Determinants of Health     Financial Resource Strain: Low Risk  (11/22/2023)    Overall Financial Resource Strain (CARDIA)     Difficulty of Paying Living Expenses: Not hard at all   Food Insecurity: No Food Insecurity (11/22/2023)    Hunger Vital Sign     Worried About Running Out of Food in the Last Year: Never true     Ran Out of Food in the Last Year: Never true   Transportation Needs: No Transportation Needs (11/22/2023)    PRAPARE - Transportation     Lack of Transportation (Medical): No     Lack of Transportation (Non-Medical): No   Physical Activity: Insufficiently Active (11/22/2023)    Exercise Vital Sign     Days of Exercise per Week: 4 days     Minutes of Exercise per Session: 10 min   Stress: Stress Concern Present (11/22/2023)    [employer] - Occupational Stress Questionnaire     Feeling of Stress : To some extent   Social Connections: Unknown (11/22/2023)    Social Connection and Isolation Panel [NHANES]     Frequency of Communication with Friends and Family: More than three times a week     Frequency of Social Gatherings with Friends and Family: More than three times a week     Active Member of Clubs or Organizations: Yes     Attends Club or Organization Meetings: More than 4 times per year     Marital Status:    Housing Stability: Low Risk  (11/22/2023)    Housing Stability Vital Sign     Unable to Pay for Housing in the Last Year: No     Number of Places Lived in the Last Year: 1     Unstable Housing in the Last Year: No       Medications/Allergies: See med card    ROS:  GENERAL: No fever. No chills. No fatigue. Denies weight loss. Denies weight gain.  Back / musculoskeletal / neuro : See HPI    VITALS:   Vitals:    12/20/23 1429   BP: 114/69   Pulse: 61   Weight: 60.2 kg (132 lb 11.5 oz)   PainSc:   2   PainLoc: Foot     Body mass index is 22.09 kg/m².      12/20/2023     2:31 PM 1/5/2023     2:12 PM 9/8/2022     1:18 PM   Last 3 PDI Scores   Pain Disability Index (PDI) 14 28 40       PHYSICAL EXAM:   GENERAL: Well appearing, in no acute distress, alert and oriented x3.  PSYCH:  Mood and affect appropriate.  SKIN: Skin color, texture, turgor normal, no rashes or lesions.  HEENT:  Normocephalic, atraumatic. Cranial nerves grossly intact.  NECK: No pain to palpation over the cervical paraspinous muscles. No pain to palpation over facets. No pain with neck flexion, extension, or lateral flexion.   PULM: No evidence of respiratory difficulty, symmetric chest rise.  GI:  Non-distended  BACK: Normal range of motion. No pain to palpation over the spinous processes. No pain to palpation over facet joints. There is no pain with palpation over the sacroiliac joints bilaterally.   EXTREMITIES: No deformities, edema, or skin  discoloration.   MUSCULOSKELETAL: Shoulder, hip, and knee provocative maneuvers are negative. No atrophy is noted.  NEURO: Sensation is equal and appropriate bilaterally. Bilateral upper and lower extremity strength is normal and symmetric. Bilateral upper and lower extremity coordination and muscle stretch reflexes are physiologic and symmetric. Plantar response are downgoing. Straight leg raising in the supine position is negative to radicular pain.   GAIT: normal.      LABS:    Lab Results   Component Value Date    HGBA1C 4.9 11/15/2023       Lab Results   Component Value Date    WBC 8.55 03/06/2023    HGB 9.4 (L) 03/06/2023    HCT 30.9 (L) 03/06/2023    MCV 95 03/06/2023     03/06/2023           ASSESSMENT: 73 y.o. year old female with pain, consistent with:    Encounter Diagnosis   Name Primary?    Type 2 diabetes mellitus with diabetic polyneuropathy, without long-term current use of insulin        DISCUSSION: Lily Green is a long history of diabetic neuropathy who comes to us with multiple recent significant medical issues and wants most conservative measure for now, SCS down the road     PLAN:  - I have stressed the importance of physical activity and a home exercise plan to help with pain and improve health.  - Patient can continue with medications for now since they are providing benefits, using them appropriately, and without side effects.  - Counseled patient regarding the importance of activity modification and constant sleeping habits.  - Interventions:  Qutenza patches to bilateral feet . Explained the risks and benefits of the procedure in detail with the patient today in clinic along with alternative treatment options, and the patient elected to pursue the intervention at this time.    - Anticoagulation use: No no anticoagulation  - If no benefit with above procedure, consider  SCS .   - Imaging: Reviewed available imaging with patient and answered any questions they had  regarding study.  - The patient's pathophysiology was explained in detail with reference to x-rays, models, other visual aids as appropriate.   - Follow up visit: return to clinic in 1-2 weeks for application    Justification for Qutenza: Trial & failure of Gabapentinoids, serotonin-norepinephrine reuptake inhibitor and not well controlled on Lyrica     I would like to thank Pavel Calixto MD for the opportunity to assist in the care of this patient. We had a very nice visit and I look forward to continuing their care. Please let me know if I can be of further assistance.     Asad Verduzco MD  12/20/2023

## 2023-12-21 DIAGNOSIS — Z99.2 ESRD (END STAGE RENAL DISEASE) ON DIALYSIS: Primary | ICD-10-CM

## 2023-12-21 DIAGNOSIS — N18.6 ESRD (END STAGE RENAL DISEASE) ON DIALYSIS: Primary | ICD-10-CM

## 2023-12-21 DIAGNOSIS — I95.9 HYPOTENSION, UNSPECIFIED HYPOTENSION TYPE: ICD-10-CM

## 2023-12-21 RX ORDER — MIDODRINE HYDROCHLORIDE 5 MG/1
5 TABLET ORAL 2 TIMES DAILY PRN
Qty: 60 TABLET | Refills: 3 | Status: ON HOLD | OUTPATIENT
Start: 2023-12-21 | End: 2024-02-02 | Stop reason: HOSPADM

## 2023-12-28 ENCOUNTER — TELEPHONE (OUTPATIENT)
Dept: CARDIOTHORACIC SURGERY | Facility: CLINIC | Age: 73
End: 2023-12-28
Payer: MEDICARE

## 2023-12-28 NOTE — PRE-PROCEDURE INSTRUCTIONS

## 2023-12-29 ENCOUNTER — ANESTHESIA EVENT (OUTPATIENT)
Dept: SURGERY | Facility: HOSPITAL | Age: 73
DRG: 163 | End: 2023-12-29
Payer: MEDICARE

## 2024-01-01 NOTE — ANESTHESIA PREPROCEDURE EVALUATION
[facility]  Anesthesia Pre-Operative Evaluation         Patient Name: Lily Green  YOB: 1950  MRN: [id number]    SUBJECTIVE:     Pre-operative evaluation for Procedure(s) (LRB):  XI ROBOTIC RIGHT MIDDLE LOBECTOMY (Right)  LYMPHADENECTOMY (Right)     01/01/2024    Lily Green is a 73 y.o. 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    11/20/23- Nuclear Stress test    Normal myocardial perfusion scan. There is no evidence of myocardial ischemia or infarction.    The LVEF is not accurate due to poor software boundary tracking at rest  and poor software boundary tracking during stress. The visually estimated ejection fraction is normal at rest and normal during stress.    There is mild global hypokinesis at rest and stress.    LV cavity size is normal at rest and normal at stress.    The ECG portion of the study is negative for ischemia.    The patient reported no chest pain during the stress test.    There were no arrhythmias during stress.    There are no prior studies for comparison.    LDA: None documented.  Date/Time: 10/20/2023 11:31 AM     Performed by: Miguelina Negro CRNA  Authorized by: Guillaume Badillo MD    Intubation:     Induction:  Intravenous    Intubated:  Postinduction    Mask Ventilation:  Easy mask    Attempts:  2    Attempted By:  Student    Method of Intubation:  Direct    Blade:  [place] 2    Laryngeal View Grade: Grade III - only epiglottis visible      Attempted By (2nd Attempt):  CRNA    Method of Intubation (2nd Attempt):  Direct    Blade (2nd Attempt):  Raul 3    Laryngeal View Grade (2nd Attempt): Grade IIa - cords partially seen      Difficult Airway Encountered?: No      Complications:  None    Airway Device:  Oral endotracheal tube    Airway Device Size:  8.0     Style/Cuff Inflation:  Cuffed (inflated to minimal occlusive pressure)    Tube secured:  21    Secured at:  The lips    Placement Verified By:  Capnometry    Complicating Factors:  Anterior larynx    Findings Post-Intubation:  BS equal bilateral and atraumatic/condition of teeth unchanged    Drips: None documented.    Patient Active Problem List   Diagnosis    Type 2 diabetes mellitus with diabetic polyneuropathy, without long-term current use of insulin    Hyperlipidemia, mixed    GERD without esophagitis    Mild recurrent major depression    Primary insomnia    Chronic neck pain    DDD (degenerative disc disease), cervical    Screening mammogram, encounter for    Celiac disease    Hand arthritis    Benign essential tremor    Chronic pain of both knees    Irritable bowel syndrome    TANIA (stress urinary incontinence, female)    Meniere's disease of both ears    Ankle weakness    Psoriasis    Idiopathic neuropathy    Bilateral foot pain    Generalized anxiety disorder    Cognitive complaints    Peritonitis    Large bowel ischemia    Elevated lactic acid level    Hyponatremia    Primary hypertension    Anemia    Hypophosphatemia    Debility    Weakness    Lung nodule    Malignant carcinoid tumor of bronchus       Review of patient's allergies indicates:   Allergen Reactions    Crestor [rosuvastatin] Swelling    Gluten protein        Current Inpatient Medications:   capsaicin-skin cleanser  1 patch Topical (Top) 1 time in Clinic/HOD       No current facility-administered medications on file prior to encounter.     Current Outpatient Medications on File Prior to Encounter   Medication Sig Dispense Refill    atorvastatin (LIPITOR) 40 MG tablet Take 1 tablet (40 mg total) by mouth once daily. (Patient taking differently: Take 40 mg by mouth every evening.) 90 tablet 3    B complex-vitamin C-folic acid (MELLISSA-BENJY) 0.8 mg Tab Take 1 tablet (0.8 mg total) by mouth once daily. 30 tablet 3    betahistine HCl (BETAHISTINE,  BULK, MISC)       blood sugar diagnostic (TRUE METRIX GLUCOSE TEST STRIP) Strp USE ONE STRIP FOR TESTING 2 TIMES A  each 11    blood-glucose meter kit Use to test twice a day 1 each 0    calcium-vitamin D3 (OS-CIPRIANO 500 + D3) 500 mg-5 mcg (200 unit) per tablet Take 1 tablet by mouth once daily. 30 tablet 3    coenzyme Q10 100 mg capsule       dicyclomine (BENTYL) 10 MG capsule       dronabinoL (MARINOL) 5 MG capsule Take 1 capsule (5 mg total) by mouth 2 (two) times daily before meals. 60 capsule 1    econazole nitrate 1 % cream Apply topically 2 (two) times daily. 30 g 2    epoetin noble-epbx (RETACRIT) 10,000 unit/mL imjection Inject 1 mL (10,000 Units total) into the skin every Mon, Wed, Fri. HOLD IF HEMOGLOBIN is 10 or GREATER    DO NOT SHAKE  DO NOT DILUTE  DO NOT MIX with other drug solutions 12 mL 0    ferrous gluconate (FERGON) 324 MG tablet Take 1 tablet (324 mg total) by mouth daily with breakfast. 30 tablet 3    insulin aspart U-100 (NOVOLOG) 100 unit/mL (3 mL) InPn pen Inject before meals:  150-200=+1, 201-250=+2, 251-300=+3; 301-350=+4, over 350=+5 units 15 mL 0    lancets Misc 1 lancet by Misc.(Non-Drug; Combo Route) route 4 (four) times daily. 400 each 3    meclizine (ANTIVERT) 25 mg tablet Take 1 tablet (25 mg total) by mouth 3 (three) times daily as needed for Dizziness. 90 tablet 3    melatonin (MELATIN) 3 mg tablet Take 3 tablets (9 mg total) by mouth nightly as needed for Insomnia. 90 tablet 3    mometasone (ELOCON) 0.1 % ointment Apply topically.      neomycin (MYCIFRADIN) 500 mg Tab       nutritional supplements Liqd Take 237 mLs by mouth 4 (four) times daily. 120 each 6    oxyCODONE (ROXICODONE) 5 MG immediate release tablet Take 5 mg by mouth every 4 (four) hours as needed.      pantoprazole (PROTONIX) 40 MG tablet Take 1 tablet (40 mg total) by mouth once daily. 90 tablet 3    patiromer calcium sorbitex (VELTASSA) 8.4 gram PwPk Take 2 packets (16.8 g total) by mouth once daily. 180  "packet 2    pen needle, diabetic (BD ULTRA-FINE MARIS PEN NEEDLE) 32 gauge x 5/32" Ndle 1 Device by Misc.(Non-Drug; Combo Route) route 4 (four) times daily with meals and nightly. 400 each 3    pregabalin (LYRICA) 150 MG capsule TAKE ONE CAPSULE BY MOUTH 2 TIMES A DAY 60 capsule 2    pregabalin (LYRICA) 75 MG capsule Take 1 capsule (75 mg total) by mouth Daily. Give after HD 30 capsule 0    sevelamer HCL (RENAGEL) 800 MG Tab Take 2 tablets (1,600 mg total) by mouth 3 (three) times daily with meals. 180 tablet 11    simethicone (MYLICON) 125 mg Cap capsule Take by mouth.      simethicone (MYLICON) 80 MG chewable tablet Take 1 tablet (80 mg total) by mouth 4 (four) times daily with meals and nightly. 120 tablet 3    vit C,H-Xa-vshso-lutein-zeaxan (PRESERVISION AREDS 2) 411-356-78-1 mg-unit-mg-mg Cap       vitamins  A,C,E-zinc-copper 14,320-226-200 unit-mg-unit Cap Take 1 tablet by mouth once daily.         Past Surgical History:   Procedure Laterality Date    APPENDECTOMY      BREAST SURGERY      Tags    CARPAL TUNNEL RELEASE Bilateral 09/2017    ,     CLOSURE, COLOSTOMY Left 1/16/2023    Procedure: CLOSURE, COLOSTOMY;  Surgeon: Manuel Javier MD;  Location: [facility] OR;  Service: General;  Laterality: Left;    COLONOSCOPY  04/2018    Normal  ( Juan A)     COLOSTOMY Right 1/16/2023    Procedure: CREATION, COLOSTOMY;  Surgeon: Manuel Javier MD;  Location: [facility] OR;  Service: General;  Laterality: Right;    DILATION AND CURETTAGE OF UTERUS  1986    DUPUYTREN CONTRACTURE RELEASE Bilateral 09/2017    HYSTERECTOMY  1987    BEAU w/ appy, no BSO     INJECTION OF NEUROLYTIC AGENT AROUND LUMBAR SYMPATHETIC NERVE N/A 1/6/2022    Procedure: BLOCK, LUMBAR SYMPATHETIC;  Surgeon: Souleymane Rizo Jr., MD;  Location: [facility] PAIN MGT;  Service: Pain Management;  Laterality: N/A;  no pacemaker   pt is diabetic     INJECTION OF NEUROLYTIC AGENT AROUND LUMBAR SYMPATHETIC NERVE N/A 8/25/2022    Procedure: BLOCK, LUMBAR " SYMPATHETIC;  Surgeon: Souleymane Rizo Jr., MD;  Location: [facility] PAIN MGT;  Service: Pain Management;  Laterality: N/A;  diabetic     INSERTION OF TUNNELED CENTRAL VENOUS HEMODIALYSIS CATHETER Right 1/25/2023    Procedure: INSERTION, CATHETER, HEMODIALYSIS, DUAL LUMEN;  Surgeon: Manuel Javier MD;  Location: [facility] OR;  Service: General;  Laterality: Right;    INSERTION, CATHETER, TUNNELED Left 1/28/2023    Procedure: Insertion,catheter,tunneled;  Surgeon: Watson Fontenot MD;  Location: [facility] OR;  Service: General;  Laterality: Left;    LAPAROTOMY, EXPLORATORY N/A 1/14/2023    Procedure: LAPAROTOMY, EXPLORATORY;  Surgeon: Manuel Javier MD;  Location: [facility] OR;  Service: General;  Laterality: N/A;    LAPAROTOMY, EXPLORATORY N/A 1/16/2023    Procedure: LAPAROTOMY, EXPLORATORY;  Surgeon: Manuel Javier MD;  Location: [facility] OR;  Service: General;  Laterality: N/A;    REMOVAL OF CATHETER Right 1/28/2023    Procedure: REMOVAL, CATHETER;  Surgeon: Watson Fontenot MD;  Location: [facility] OR;  Service: General;  Laterality: Right;    REMOVAL, TUNNELED CATH Right 1/25/2023    Procedure: REMOVAL, TUNNELED CATH;  Surgeon: Manuel Javier MD;  Location: [facility] OR;  Service: General;  Laterality: Right;    ROBOTIC BRONCHOSCOPY N/A 10/20/2023    Procedure: ROBOTIC BRONCHOSCOPY;  Surgeon: Mayda Monzon MD;  Location: [address];  Service: Pulmonary;  Laterality: N/A;    SHOULDER SURGERY  2009 & 2010    right rotator cuff    TONSILLECTOMY      UPPER GASTROINTESTINAL ENDOSCOPY  04/2018       Social History     Socioeconomic History    Marital status:    Tobacco Use    Smoking status: Never    Smokeless tobacco: Never   Substance and Sexual Activity    Alcohol use: No    Drug use: Never    Sexual activity: Not Currently     Partners: Male     Birth control/protection: See Surgical Hx     Comment: :      Social Determinants of Health     Financial Resource Strain: Low Risk  (11/22/2023)    Overall Financial  "Resource Strain (CARDIA)     Difficulty of Paying Living Expenses: Not hard at all   Food Insecurity: No Food Insecurity (11/22/2023)    Hunger Vital Sign     Worried About Running Out of Food in the Last Year: Never true     Ran Out of Food in the Last Year: Never true   Transportation Needs: No Transportation Needs (11/22/2023)    PRAPARE - Transportation     Lack of Transportation (Medical): No     Lack of Transportation (Non-Medical): No   Physical Activity: Insufficiently Active (11/22/2023)    Exercise Vital Sign     Days of Exercise per Week: 4 days     Minutes of Exercise per Session: 10 min   Stress: Stress Concern Present (11/22/2023)    [facility] - Occupational Stress Questionnaire     Feeling of Stress : To some extent   Social Connections: Unknown (11/22/2023)    Social Connection and Isolation Panel [NHANES]     Frequency of Communication with Friends and Family: More than three times a week     Frequency of Social Gatherings with Friends and Family: More than three times a week     Active Member of Clubs or Organizations: Yes     Attends Club or Organization Meetings: More than 4 times per year     Marital Status:    Housing Stability: Low Risk  (11/22/2023)    Housing Stability Vital Sign     Unable to Pay for Housing in the Last Year: No     Number of Places Lived in the Last Year: 1     Unstable Housing in the Last Year: No       OBJECTIVE:     Vital Signs Range (Last 24H):         CBC:   No results for input(s): "WBC", "RBC", "HGB", "HCT", "PLT", "MCV", "MCH", "MCHC" in the last 72 hours.    CMP: No results for input(s): "NA", "K", "CL", "CO2", "BUN", "CREATININE", "GLU", "MG", "PHOS", "CALCIUM", "ALBUMIN", "PROT", "ALKPHOS", "ALT", "AST", "BILITOT" in the last 72 hours.    INR:  No results for input(s): "PT", "INR", "PROTIME", "APTT" in the last 72 hours.    Diagnostic Studies: No relevant studies.    EKG:   Results for orders placed or performed during the " hospital encounter of 01/27/23   EKG 12-lead    Collection Time: 01/27/23 10:23 PM    Narrative    Test Reason : N18.6,    Vent. Rate : 090 BPM     Atrial Rate : 090 BPM     P-R Int : 158 ms          QRS Dur : 080 ms      QT Int : 356 ms       P-R-T Axes : 064 044 049 degrees     QTc Int : 435 ms    Normal sinus rhythm  Low voltage QRS  Borderline Abnormal ECG  When compared with ECG of 13-JAN-2023 13:54,  Premature ventricular complexes are no longer Present  Vent. rate has decreased  BPM  QRS duration has decreased  Nonspecific T wave abnormality no longer evident in Inferior leads  Nonspecific T wave abnormality no longer evident in Anterior-lateral leads  Confirmed by Noah Saini MD ([id number]) on 1/30/2023 1:48:06 PM    Referred By: [email or web address]   SELF           Confirmed By:Noah Saini MD        2D ECHO:   Results for orders placed during the hospital encounter of 01/13/23    Echo    Interpretation Summary  · Concentric hypertrophy and  · Normal right ventricular size with normal right ventricular systolic function.  · The estimated ejection fraction is 35%.  · Grade I left ventricular diastolic dysfunction.  · Mild aortic regurgitation.  · Mild mitral regurgitation.  · Mild tricuspid regurgitation.  · There is no pulmonary hypertension.         ASSESSMENT/PLAN:         Pre-op Assessment    I have reviewed the Patient Summary Reports.     I have reviewed the Nursing Notes.    I have reviewed the Medications.     Review of Systems  Anesthesia Hx:  No problems with previous Anesthesia   History of prior surgery of interest to airway management or planning:          Denies Family Hx of Anesthesia complications.    Denies Personal Hx of Anesthesia complications.                    Hematology/Oncology:       -- Anemia:                    --  Cancer in past history:                     Cardiovascular:      Denies Hypertension.       Denies Angina.     hyperlipidemia   ECG has been reviewed.                           Pulmonary:       Denies Shortness of breath.  Denies Recent URI.                 Renal/:  Renal/ Normal                 Hepatic/GI:   PUD,  GERD Denies Liver Disease.            Musculoskeletal:  Arthritis               Neurological:    Denies CVA. Neuromuscular Disease,   Denies Seizures.                                Endocrine:  Diabetes, well controlled, type 2           Psych:   anxiety depression                Physical Exam  General: Well nourished, Cooperative and Alert    Airway:  Mallampati: II   Mouth Opening: Normal  TM Distance: Normal  Tongue: Normal  Neck ROM: Normal ROM    Dental:  Intact        Anesthesia Plan  Type of Anesthesia, risks & benefits discussed:    Anesthesia Type: Gen ETT  Intra-op Monitoring Plan: Standard ASA Monitors and Art Line  Post Op Pain Control Plan: multimodal analgesia  Induction:  IV  Airway Plan: Direct, Post-Induction  Informed Consent: Informed consent signed with the Patient and all parties understand the risks and agree with anesthesia plan.  All questions answered.   ASA Score: 3  Day of Surgery Review of History & Physical: H&P Update referred to the surgeon/provider.    Ready For Surgery From Anesthesia Perspective.     .

## 2024-01-02 ENCOUNTER — ANESTHESIA (OUTPATIENT)
Dept: SURGERY | Facility: HOSPITAL | Age: 74
DRG: 163 | End: 2024-01-02
Payer: MEDICARE

## 2024-01-02 ENCOUNTER — HOSPITAL ENCOUNTER (INPATIENT)
Facility: HOSPITAL | Age: 74
LOS: 35 days | Discharge: NURSING, REHAB OR LONG-TERM CARE | DRG: 163 | End: 2024-02-06
Attending: STUDENT IN AN ORGANIZED HEALTH CARE EDUCATION/TRAINING PROGRAM | Admitting: STUDENT IN AN ORGANIZED HEALTH CARE EDUCATION/TRAINING PROGRAM
Payer: MEDICARE

## 2024-01-02 DIAGNOSIS — R07.9 CHEST PAIN: ICD-10-CM

## 2024-01-02 DIAGNOSIS — D50.0 ANEMIA DUE TO GI BLOOD LOSS: ICD-10-CM

## 2024-01-02 DIAGNOSIS — N18.6 ESRD (END STAGE RENAL DISEASE): ICD-10-CM

## 2024-01-02 DIAGNOSIS — E11.42 TYPE 2 DIABETES MELLITUS WITH DIABETIC POLYNEUROPATHY, WITHOUT LONG-TERM CURRENT USE OF INSULIN: ICD-10-CM

## 2024-01-02 DIAGNOSIS — J18.9 PARAPNEUMONIC EFFUSION: ICD-10-CM

## 2024-01-02 DIAGNOSIS — Z01.818 PRE-OP EXAM: ICD-10-CM

## 2024-01-02 DIAGNOSIS — D63.1 ANEMIA IN ESRD (END-STAGE RENAL DISEASE): ICD-10-CM

## 2024-01-02 DIAGNOSIS — K92.1 MELENA: ICD-10-CM

## 2024-01-02 DIAGNOSIS — I49.9 ARRHYTHMIA: ICD-10-CM

## 2024-01-02 DIAGNOSIS — I49.5 TACHY-BRADY SYNDROME: ICD-10-CM

## 2024-01-02 DIAGNOSIS — I49.8 ATRIAL ARRHYTHMIA: ICD-10-CM

## 2024-01-02 DIAGNOSIS — K92.2 GASTROINTESTINAL HEMORRHAGE, UNSPECIFIED GASTROINTESTINAL HEMORRHAGE TYPE: ICD-10-CM

## 2024-01-02 DIAGNOSIS — N18.9 CHRONIC KIDNEY DISEASE-MINERAL AND BONE DISORDER: ICD-10-CM

## 2024-01-02 DIAGNOSIS — J91.8 PARAPNEUMONIC EFFUSION: ICD-10-CM

## 2024-01-02 DIAGNOSIS — D3A.090 CARCINOID TUMOR OF LUNG: Primary | ICD-10-CM

## 2024-01-02 DIAGNOSIS — I10 PRIMARY HYPERTENSION: ICD-10-CM

## 2024-01-02 DIAGNOSIS — E83.9 CHRONIC KIDNEY DISEASE-MINERAL AND BONE DISORDER: ICD-10-CM

## 2024-01-02 DIAGNOSIS — C7A.090 MALIGNANT CARCINOID TUMOR OF BRONCHUS: ICD-10-CM

## 2024-01-02 DIAGNOSIS — I48.91 A-FIB: ICD-10-CM

## 2024-01-02 DIAGNOSIS — N18.6 ANEMIA IN ESRD (END-STAGE RENAL DISEASE): ICD-10-CM

## 2024-01-02 DIAGNOSIS — M89.9 CHRONIC KIDNEY DISEASE-MINERAL AND BONE DISORDER: ICD-10-CM

## 2024-01-02 DIAGNOSIS — R00.1 BRADYCARDIA: ICD-10-CM

## 2024-01-02 DIAGNOSIS — I48.91 ATRIAL FIBRILLATION: ICD-10-CM

## 2024-01-02 LAB
ABO + RH BLD: NORMAL
ALBUMIN SERPL BCP-MCNC: 3.7 G/DL (ref 3.5–5.2)
ALBUMIN SERPL BCP-MCNC: 4 G/DL (ref 3.5–5.2)
ALP SERPL-CCNC: 108 U/L (ref 55–135)
ALT SERPL W/O P-5'-P-CCNC: 29 U/L (ref 10–44)
ANION GAP SERPL CALC-SCNC: 14 MMOL/L (ref 8–16)
ANION GAP SERPL CALC-SCNC: 17 MMOL/L (ref 8–16)
AST SERPL-CCNC: 29 U/L (ref 10–40)
BASOPHILS # BLD AUTO: 0.02 K/UL (ref 0–0.2)
BASOPHILS # BLD AUTO: 0.04 K/UL (ref 0–0.2)
BASOPHILS NFR BLD: 0.2 % (ref 0–1.9)
BASOPHILS NFR BLD: 0.5 % (ref 0–1.9)
BILIRUB SERPL-MCNC: 0.8 MG/DL (ref 0.1–1)
BLD GP AB SCN CELLS X3 SERPL QL: NORMAL
BLD PROD TYP BPU: NORMAL
BLOOD UNIT EXPIRATION DATE: NORMAL
BLOOD UNIT TYPE CODE: 6200
BLOOD UNIT TYPE: NORMAL
BUN SERPL-MCNC: 31 MG/DL (ref 8–23)
BUN SERPL-MCNC: 32 MG/DL (ref 8–23)
CALCIUM SERPL-MCNC: 10.5 MG/DL (ref 8.7–10.5)
CALCIUM SERPL-MCNC: 9.8 MG/DL (ref 8.7–10.5)
CHLORIDE SERPL-SCNC: 100 MMOL/L (ref 95–110)
CHLORIDE SERPL-SCNC: 92 MMOL/L (ref 95–110)
CO2 SERPL-SCNC: 25 MMOL/L (ref 23–29)
CO2 SERPL-SCNC: 30 MMOL/L (ref 23–29)
CODING SYSTEM: NORMAL
CREAT SERPL-MCNC: 4.3 MG/DL (ref 0.5–1.4)
CREAT SERPL-MCNC: 4.6 MG/DL (ref 0.5–1.4)
CROSSMATCH INTERPRETATION: NORMAL
DIFFERENTIAL METHOD BLD: ABNORMAL
DIFFERENTIAL METHOD BLD: ABNORMAL
DISPENSE STATUS: NORMAL
EOSINOPHIL # BLD AUTO: 0 K/UL (ref 0–0.5)
EOSINOPHIL # BLD AUTO: 0.1 K/UL (ref 0–0.5)
EOSINOPHIL NFR BLD: 0.1 % (ref 0–8)
EOSINOPHIL NFR BLD: 1.6 % (ref 0–8)
ERYTHROCYTE [DISTWIDTH] IN BLOOD BY AUTOMATED COUNT: 14.5 % (ref 11.5–14.5)
ERYTHROCYTE [DISTWIDTH] IN BLOOD BY AUTOMATED COUNT: 16 % (ref 11.5–14.5)
EST. GFR  (NO RACE VARIABLE): 10.3 ML/MIN/1.73 M^2
EST. GFR  (NO RACE VARIABLE): 9.5 ML/MIN/1.73 M^2
GLUCOSE SERPL-MCNC: 107 MG/DL (ref 70–110)
GLUCOSE SERPL-MCNC: 171 MG/DL (ref 70–110)
GLUCOSE SERPL-MCNC: 183 MG/DL (ref 70–110)
GLUCOSE SERPL-MCNC: 217 MG/DL (ref 70–110)
HCO3 UR-SCNC: 24.9 MMOL/L (ref 24–28)
HCO3 UR-SCNC: 27.5 MMOL/L (ref 24–28)
HCT VFR BLD AUTO: 36.1 % (ref 37–48.5)
HCT VFR BLD AUTO: 37.8 % (ref 37–48.5)
HCT VFR BLD CALC: 34 %PCV (ref 36–54)
HCT VFR BLD CALC: 35 %PCV (ref 36–54)
HGB BLD-MCNC: 11.8 G/DL (ref 12–16)
HGB BLD-MCNC: 12.8 G/DL (ref 12–16)
IMM GRANULOCYTES # BLD AUTO: 0.01 K/UL (ref 0–0.04)
IMM GRANULOCYTES # BLD AUTO: 0.05 K/UL (ref 0–0.04)
IMM GRANULOCYTES NFR BLD AUTO: 0.1 % (ref 0–0.5)
IMM GRANULOCYTES NFR BLD AUTO: 0.4 % (ref 0–0.5)
LYMPHOCYTES # BLD AUTO: 1.2 K/UL (ref 1–4.8)
LYMPHOCYTES # BLD AUTO: 2.2 K/UL (ref 1–4.8)
LYMPHOCYTES NFR BLD: 25.8 % (ref 18–48)
LYMPHOCYTES NFR BLD: 9.6 % (ref 18–48)
MCH RBC QN AUTO: 30.1 PG (ref 27–31)
MCH RBC QN AUTO: 31.1 PG (ref 27–31)
MCHC RBC AUTO-ENTMCNC: 32.7 G/DL (ref 32–36)
MCHC RBC AUTO-ENTMCNC: 33.9 G/DL (ref 32–36)
MCV RBC AUTO: 92 FL (ref 82–98)
MCV RBC AUTO: 92 FL (ref 82–98)
MONOCYTES # BLD AUTO: 0.4 K/UL (ref 0.3–1)
MONOCYTES # BLD AUTO: 1.4 K/UL (ref 0.3–1)
MONOCYTES NFR BLD: 16.3 % (ref 4–15)
MONOCYTES NFR BLD: 3.3 % (ref 4–15)
NEUTROPHILS # BLD AUTO: 11 K/UL (ref 1.8–7.7)
NEUTROPHILS # BLD AUTO: 4.8 K/UL (ref 1.8–7.7)
NEUTROPHILS NFR BLD: 55.7 % (ref 38–73)
NEUTROPHILS NFR BLD: 86.4 % (ref 38–73)
NRBC BLD-RTO: 0 /100 WBC
NRBC BLD-RTO: 0 /100 WBC
NUM UNITS TRANS FFP: NORMAL
NUM UNITS TRANS FFP: NORMAL
NUM UNITS TRANS PACKED RBC: NORMAL
NUM UNITS TRANS PACKED RBC: NORMAL
PCO2 BLDA: 42.5 MMHG (ref 35–45)
PCO2 BLDA: 43.4 MMHG (ref 35–45)
PH SMN: 7.37 [PH] (ref 7.35–7.45)
PH SMN: 7.42 [PH] (ref 7.35–7.45)
PHOSPHATE SERPL-MCNC: 5.1 MG/DL (ref 2.7–4.5)
PLATELET # BLD AUTO: 147 K/UL (ref 150–450)
PLATELET # BLD AUTO: 189 K/UL (ref 150–450)
PMV BLD AUTO: 10.9 FL (ref 9.2–12.9)
PMV BLD AUTO: 11.4 FL (ref 9.2–12.9)
PO2 BLDA: 154 MMHG (ref 80–100)
PO2 BLDA: 318 MMHG (ref 80–100)
POC BE: 0 MMOL/L
POC BE: 3 MMOL/L
POC IONIZED CALCIUM: 1.03 MMOL/L (ref 1.06–1.42)
POC IONIZED CALCIUM: 1.11 MMOL/L (ref 1.06–1.42)
POC SATURATED O2: 100 % (ref 95–100)
POC SATURATED O2: 99 % (ref 95–100)
POC TCO2: 26 MMOL/L (ref 23–27)
POC TCO2: 29 MMOL/L (ref 23–27)
POCT GLUCOSE: 104 MG/DL (ref 70–110)
POCT GLUCOSE: 157 MG/DL (ref 70–110)
POCT GLUCOSE: 161 MG/DL (ref 70–110)
POCT GLUCOSE: 164 MG/DL (ref 70–110)
POCT GLUCOSE: 174 MG/DL (ref 70–110)
POTASSIUM BLD-SCNC: 4.5 MMOL/L (ref 3.5–5.1)
POTASSIUM BLD-SCNC: 4.8 MMOL/L (ref 3.5–5.1)
POTASSIUM SERPL-SCNC: 4.5 MMOL/L (ref 3.5–5.1)
POTASSIUM SERPL-SCNC: 5.7 MMOL/L (ref 3.5–5.1)
PROT SERPL-MCNC: 8.7 G/DL (ref 6–8.4)
RBC # BLD AUTO: 3.92 M/UL (ref 4–5.4)
RBC # BLD AUTO: 4.11 M/UL (ref 4–5.4)
SAMPLE: ABNORMAL
SAMPLE: ABNORMAL
SODIUM BLD-SCNC: 133 MMOL/L (ref 136–145)
SODIUM BLD-SCNC: 134 MMOL/L (ref 136–145)
SODIUM SERPL-SCNC: 139 MMOL/L (ref 136–145)
SODIUM SERPL-SCNC: 139 MMOL/L (ref 136–145)
SPECIMEN OUTDATE: NORMAL
WBC # BLD AUTO: 12.68 K/UL (ref 3.9–12.7)
WBC # BLD AUTO: 8.66 K/UL (ref 3.9–12.7)

## 2024-01-02 PROCEDURE — 63600175 PHARM REV CODE 636 W HCPCS: Performed by: STUDENT IN AN ORGANIZED HEALTH CARE EDUCATION/TRAINING PROGRAM

## 2024-01-02 PROCEDURE — 88311 DECALCIFY TISSUE: CPT | Performed by: PATHOLOGY

## 2024-01-02 PROCEDURE — P9017 PLASMA 1 DONOR FRZ W/IN 8 HR: HCPCS | Performed by: ANESTHESIOLOGY

## 2024-01-02 PROCEDURE — 38746 REMOVE THORACIC LYMPH NODES: CPT | Mod: AS,,, | Performed by: PHYSICIAN ASSISTANT

## 2024-01-02 PROCEDURE — 0PB10ZZ EXCISION OF 1 TO 2 RIBS, OPEN APPROACH: ICD-10-PCS | Performed by: STUDENT IN AN ORGANIZED HEALTH CARE EDUCATION/TRAINING PROGRAM

## 2024-01-02 PROCEDURE — 0BBD0ZZ EXCISION OF RIGHT MIDDLE LUNG LOBE, OPEN APPROACH: ICD-10-PCS | Performed by: STUDENT IN AN ORGANIZED HEALTH CARE EDUCATION/TRAINING PROGRAM

## 2024-01-02 PROCEDURE — 37000008 HC ANESTHESIA 1ST 15 MINUTES: Performed by: STUDENT IN AN ORGANIZED HEALTH CARE EDUCATION/TRAINING PROGRAM

## 2024-01-02 PROCEDURE — 85025 COMPLETE CBC W/AUTO DIFF WBC: CPT | Performed by: PHYSICIAN ASSISTANT

## 2024-01-02 PROCEDURE — 88341 IMHCHEM/IMCYTCHM EA ADD ANTB: CPT | Mod: 26,,, | Performed by: PATHOLOGY

## 2024-01-02 PROCEDURE — 88342 IMHCHEM/IMCYTCHM 1ST ANTB: CPT | Mod: 26,,, | Performed by: PATHOLOGY

## 2024-01-02 PROCEDURE — 86920 COMPATIBILITY TEST SPIN: CPT | Performed by: ANESTHESIOLOGY

## 2024-01-02 PROCEDURE — 88307 TISSUE EXAM BY PATHOLOGIST: CPT | Mod: 59 | Performed by: PATHOLOGY

## 2024-01-02 PROCEDURE — 02QS0ZZ REPAIR RIGHT PULMONARY VEIN, OPEN APPROACH: ICD-10-PCS | Performed by: THORACIC SURGERY (CARDIOTHORACIC VASCULAR SURGERY)

## 2024-01-02 PROCEDURE — 32505 WEDGE RESECT OF LUNG INITIAL: CPT | Mod: RT,,, | Performed by: STUDENT IN AN ORGANIZED HEALTH CARE EDUCATION/TRAINING PROGRAM

## 2024-01-02 PROCEDURE — 07B70ZX EXCISION OF THORAX LYMPHATIC, OPEN APPROACH, DIAGNOSTIC: ICD-10-PCS | Performed by: STUDENT IN AN ORGANIZED HEALTH CARE EDUCATION/TRAINING PROGRAM

## 2024-01-02 PROCEDURE — 25000003 PHARM REV CODE 250: Performed by: STUDENT IN AN ORGANIZED HEALTH CARE EDUCATION/TRAINING PROGRAM

## 2024-01-02 PROCEDURE — 82962 GLUCOSE BLOOD TEST: CPT | Performed by: STUDENT IN AN ORGANIZED HEALTH CARE EDUCATION/TRAINING PROGRAM

## 2024-01-02 PROCEDURE — 80053 COMPREHEN METABOLIC PANEL: CPT | Performed by: PHYSICIAN ASSISTANT

## 2024-01-02 PROCEDURE — 30233N1 TRANSFUSION OF NONAUTOLOGOUS RED BLOOD CELLS INTO PERIPHERAL VEIN, PERCUTANEOUS APPROACH: ICD-10-PCS | Performed by: STUDENT IN AN ORGANIZED HEALTH CARE EDUCATION/TRAINING PROGRAM

## 2024-01-02 PROCEDURE — 88341 IMHCHEM/IMCYTCHM EA ADD ANTB: CPT | Performed by: PATHOLOGY

## 2024-01-02 PROCEDURE — 80069 RENAL FUNCTION PANEL: CPT | Performed by: PHYSICIAN ASSISTANT

## 2024-01-02 PROCEDURE — 36000711: Performed by: STUDENT IN AN ORGANIZED HEALTH CARE EDUCATION/TRAINING PROGRAM

## 2024-01-02 PROCEDURE — P9016 RBC LEUKOCYTES REDUCED: HCPCS | Performed by: ANESTHESIOLOGY

## 2024-01-02 PROCEDURE — 71000015 HC POSTOP RECOV 1ST HR: Performed by: STUDENT IN AN ORGANIZED HEALTH CARE EDUCATION/TRAINING PROGRAM

## 2024-01-02 PROCEDURE — 88342 IMHCHEM/IMCYTCHM 1ST ANTB: CPT | Performed by: PATHOLOGY

## 2024-01-02 PROCEDURE — 94761 N-INVAS EAR/PLS OXIMETRY MLT: CPT

## 2024-01-02 PROCEDURE — C9290 INJ, BUPIVACAINE LIPOSOME: HCPCS | Performed by: STUDENT IN AN ORGANIZED HEALTH CARE EDUCATION/TRAINING PROGRAM

## 2024-01-02 PROCEDURE — 32505 WEDGE RESECT OF LUNG INITIAL: CPT | Mod: AS,RT,, | Performed by: PHYSICIAN ASSISTANT

## 2024-01-02 PROCEDURE — 25000003 PHARM REV CODE 250: Performed by: PHYSICIAN ASSISTANT

## 2024-01-02 PROCEDURE — 86920 COMPATIBILITY TEST SPIN: CPT | Performed by: STUDENT IN AN ORGANIZED HEALTH CARE EDUCATION/TRAINING PROGRAM

## 2024-01-02 PROCEDURE — 33320 REPAIR MAJOR BLOOD VESSEL(S): CPT | Mod: RT,,, | Performed by: THORACIC SURGERY (CARDIOTHORACIC VASCULAR SURGERY)

## 2024-01-02 PROCEDURE — 86901 BLOOD TYPING SEROLOGIC RH(D): CPT | Performed by: PHYSICIAN ASSISTANT

## 2024-01-02 PROCEDURE — 36000710: Performed by: STUDENT IN AN ORGANIZED HEALTH CARE EDUCATION/TRAINING PROGRAM

## 2024-01-02 PROCEDURE — 88305 TISSUE EXAM BY PATHOLOGIST: CPT | Performed by: PATHOLOGY

## 2024-01-02 PROCEDURE — 63600175 PHARM REV CODE 636 W HCPCS: Mod: JG | Performed by: STUDENT IN AN ORGANIZED HEALTH CARE EDUCATION/TRAINING PROGRAM

## 2024-01-02 PROCEDURE — 30233K1 TRANSFUSION OF NONAUTOLOGOUS FROZEN PLASMA INTO PERIPHERAL VEIN, PERCUTANEOUS APPROACH: ICD-10-PCS | Performed by: STUDENT IN AN ORGANIZED HEALTH CARE EDUCATION/TRAINING PROGRAM

## 2024-01-02 PROCEDURE — 0BJ08ZZ INSPECTION OF TRACHEOBRONCHIAL TREE, VIA NATURAL OR ARTIFICIAL OPENING ENDOSCOPIC: ICD-10-PCS | Performed by: STUDENT IN AN ORGANIZED HEALTH CARE EDUCATION/TRAINING PROGRAM

## 2024-01-02 PROCEDURE — 71000016 HC POSTOP RECOV ADDL HR: Performed by: STUDENT IN AN ORGANIZED HEALTH CARE EDUCATION/TRAINING PROGRAM

## 2024-01-02 PROCEDURE — 99222 1ST HOSP IP/OBS MODERATE 55: CPT | Mod: ,,, | Performed by: NURSE PRACTITIONER

## 2024-01-02 PROCEDURE — 36620 INSERTION CATHETER ARTERY: CPT | Mod: 59,,, | Performed by: ANESTHESIOLOGY

## 2024-01-02 PROCEDURE — C1729 CATH, DRAINAGE: HCPCS | Performed by: STUDENT IN AN ORGANIZED HEALTH CARE EDUCATION/TRAINING PROGRAM

## 2024-01-02 PROCEDURE — D9220A PRA ANESTHESIA: Mod: ,,, | Performed by: ANESTHESIOLOGY

## 2024-01-02 PROCEDURE — 27201423 OPTIME MED/SURG SUP & DEVICES STERILE SUPPLY: Performed by: STUDENT IN AN ORGANIZED HEALTH CARE EDUCATION/TRAINING PROGRAM

## 2024-01-02 PROCEDURE — 38746 REMOVE THORACIC LYMPH NODES: CPT | Mod: ,,, | Performed by: STUDENT IN AN ORGANIZED HEALTH CARE EDUCATION/TRAINING PROGRAM

## 2024-01-02 PROCEDURE — 88305 TISSUE EXAM BY PATHOLOGIST: CPT | Mod: 26,,, | Performed by: PATHOLOGY

## 2024-01-02 PROCEDURE — 99222 1ST HOSP IP/OBS MODERATE 55: CPT | Mod: ,,,

## 2024-01-02 PROCEDURE — 94799 UNLISTED PULMONARY SVC/PX: CPT | Mod: XB

## 2024-01-02 PROCEDURE — P9045 ALBUMIN (HUMAN), 5%, 250 ML: HCPCS | Mod: JZ,JG | Performed by: STUDENT IN AN ORGANIZED HEALTH CARE EDUCATION/TRAINING PROGRAM

## 2024-01-02 PROCEDURE — 71000033 HC RECOVERY, INTIAL HOUR: Performed by: STUDENT IN AN ORGANIZED HEALTH CARE EDUCATION/TRAINING PROGRAM

## 2024-01-02 PROCEDURE — 99900035 HC TECH TIME PER 15 MIN (STAT)

## 2024-01-02 PROCEDURE — 11000001 HC ACUTE MED/SURG PRIVATE ROOM

## 2024-01-02 PROCEDURE — 88307 TISSUE EXAM BY PATHOLOGIST: CPT | Mod: 26,,, | Performed by: PATHOLOGY

## 2024-01-02 PROCEDURE — 20600001 HC STEP DOWN PRIVATE ROOM

## 2024-01-02 PROCEDURE — 37000009 HC ANESTHESIA EA ADD 15 MINS: Performed by: STUDENT IN AN ORGANIZED HEALTH CARE EDUCATION/TRAINING PROGRAM

## 2024-01-02 RX ORDER — ACETAMINOPHEN 500 MG
1000 TABLET ORAL ONCE
Status: COMPLETED | OUTPATIENT
Start: 2024-01-02 | End: 2024-01-02

## 2024-01-02 RX ORDER — POLYETHYLENE GLYCOL 3350 17 G/17G
17 POWDER, FOR SOLUTION ORAL DAILY
Status: DISCONTINUED | OUTPATIENT
Start: 2024-01-02 | End: 2024-01-07

## 2024-01-02 RX ORDER — CALCIUM CHLORIDE INJECTION 100 MG/ML
INJECTION, SOLUTION INTRAVENOUS
Status: DISCONTINUED | OUTPATIENT
Start: 2024-01-02 | End: 2024-01-02

## 2024-01-02 RX ORDER — ONDANSETRON 8 MG/1
8 TABLET, ORALLY DISINTEGRATING ORAL EVERY 8 HOURS PRN
Status: DISCONTINUED | OUTPATIENT
Start: 2024-01-02 | End: 2024-01-06

## 2024-01-02 RX ORDER — OXYCODONE HYDROCHLORIDE 5 MG/1
5 TABLET ORAL EVERY 4 HOURS PRN
Status: DISCONTINUED | OUTPATIENT
Start: 2024-01-02 | End: 2024-01-08

## 2024-01-02 RX ORDER — ACETAMINOPHEN 500 MG
1000 TABLET ORAL
Status: DISCONTINUED | OUTPATIENT
Start: 2024-01-02 | End: 2024-01-02

## 2024-01-02 RX ORDER — ONDANSETRON 2 MG/ML
INJECTION INTRAMUSCULAR; INTRAVENOUS
Status: DISCONTINUED | OUTPATIENT
Start: 2024-01-02 | End: 2024-01-02

## 2024-01-02 RX ORDER — FENTANYL CITRATE 50 UG/ML
25 INJECTION, SOLUTION INTRAMUSCULAR; INTRAVENOUS EVERY 5 MIN PRN
Status: COMPLETED | OUTPATIENT
Start: 2024-01-02 | End: 2024-01-02

## 2024-01-02 RX ORDER — IBUPROFEN 200 MG
16 TABLET ORAL
Status: DISCONTINUED | OUTPATIENT
Start: 2024-01-02 | End: 2024-02-06 | Stop reason: HOSPADM

## 2024-01-02 RX ORDER — KETAMINE HCL IN 0.9 % NACL 50 MG/5 ML
SYRINGE (ML) INTRAVENOUS
Status: DISCONTINUED | OUTPATIENT
Start: 2024-01-02 | End: 2024-01-02

## 2024-01-02 RX ORDER — ROCURONIUM BROMIDE 10 MG/ML
INJECTION, SOLUTION INTRAVENOUS
Status: DISCONTINUED | OUTPATIENT
Start: 2024-01-02 | End: 2024-01-02

## 2024-01-02 RX ORDER — LIDOCAINE HYDROCHLORIDE 20 MG/ML
INJECTION, SOLUTION EPIDURAL; INFILTRATION; INTRACAUDAL; PERINEURAL
Status: DISCONTINUED | OUTPATIENT
Start: 2024-01-02 | End: 2024-01-02

## 2024-01-02 RX ORDER — PANTOPRAZOLE SODIUM 40 MG/1
40 TABLET, DELAYED RELEASE ORAL DAILY
Status: DISCONTINUED | OUTPATIENT
Start: 2024-01-02 | End: 2024-01-08

## 2024-01-02 RX ORDER — AMOXICILLIN 250 MG
1 CAPSULE ORAL 2 TIMES DAILY
Status: DISCONTINUED | OUTPATIENT
Start: 2024-01-02 | End: 2024-01-08

## 2024-01-02 RX ORDER — BISACODYL 10 MG/1
10 SUPPOSITORY RECTAL DAILY PRN
Status: DISCONTINUED | OUTPATIENT
Start: 2024-01-02 | End: 2024-02-06 | Stop reason: HOSPADM

## 2024-01-02 RX ORDER — DEXAMETHASONE SODIUM PHOSPHATE 4 MG/ML
INJECTION, SOLUTION INTRA-ARTICULAR; INTRALESIONAL; INTRAMUSCULAR; INTRAVENOUS; SOFT TISSUE
Status: DISCONTINUED | OUTPATIENT
Start: 2024-01-02 | End: 2024-01-02

## 2024-01-02 RX ORDER — HEPARIN SODIUM 5000 [USP'U]/ML
5000 INJECTION, SOLUTION INTRAVENOUS; SUBCUTANEOUS EVERY 8 HOURS
Status: DISCONTINUED | OUTPATIENT
Start: 2024-01-03 | End: 2024-01-07

## 2024-01-02 RX ORDER — BUPIVACAINE HYDROCHLORIDE 5 MG/ML
INJECTION, SOLUTION EPIDURAL; INTRACAUDAL
Status: DISCONTINUED | OUTPATIENT
Start: 2024-01-02 | End: 2024-01-02 | Stop reason: HOSPADM

## 2024-01-02 RX ORDER — PHENYLEPHRINE HCL IN 0.9% NACL 1 MG/10 ML
SYRINGE (ML) INTRAVENOUS
Status: DISCONTINUED | OUTPATIENT
Start: 2024-01-02 | End: 2024-01-02

## 2024-01-02 RX ORDER — PROCHLORPERAZINE EDISYLATE 5 MG/ML
5 INJECTION INTRAMUSCULAR; INTRAVENOUS EVERY 30 MIN PRN
Status: DISCONTINUED | OUTPATIENT
Start: 2024-01-02 | End: 2024-01-02 | Stop reason: HOSPADM

## 2024-01-02 RX ORDER — PROPOFOL 10 MG/ML
VIAL (ML) INTRAVENOUS
Status: DISCONTINUED | OUTPATIENT
Start: 2024-01-02 | End: 2024-01-02

## 2024-01-02 RX ORDER — SODIUM CHLORIDE 9 MG/ML
INJECTION, SOLUTION INTRAVENOUS ONCE
Status: COMPLETED | OUTPATIENT
Start: 2024-01-03 | End: 2024-01-03

## 2024-01-02 RX ORDER — DEXMEDETOMIDINE HYDROCHLORIDE 100 UG/ML
INJECTION, SOLUTION INTRAVENOUS
Status: DISCONTINUED | OUTPATIENT
Start: 2024-01-02 | End: 2024-01-02

## 2024-01-02 RX ORDER — GLUCAGON 1 MG
1 KIT INJECTION
Status: DISCONTINUED | OUTPATIENT
Start: 2024-01-02 | End: 2024-02-06 | Stop reason: HOSPADM

## 2024-01-02 RX ORDER — NOREPINEPHRINE BITARTRATE 1 MG/ML
INJECTION, SOLUTION INTRAVENOUS
Status: DISCONTINUED | OUTPATIENT
Start: 2024-01-02 | End: 2024-01-02

## 2024-01-02 RX ORDER — FENTANYL CITRATE 50 UG/ML
INJECTION, SOLUTION INTRAMUSCULAR; INTRAVENOUS
Status: DISCONTINUED | OUTPATIENT
Start: 2024-01-02 | End: 2024-01-02

## 2024-01-02 RX ORDER — INSULIN ASPART 100 [IU]/ML
0-5 INJECTION, SOLUTION INTRAVENOUS; SUBCUTANEOUS EVERY 4 HOURS PRN
Status: DISCONTINUED | OUTPATIENT
Start: 2024-01-02 | End: 2024-01-11

## 2024-01-02 RX ORDER — PREGABALIN 75 MG/1
75 CAPSULE ORAL DAILY
Status: DISCONTINUED | OUTPATIENT
Start: 2024-01-02 | End: 2024-01-08

## 2024-01-02 RX ORDER — ACETAMINOPHEN 325 MG/1
650 TABLET ORAL EVERY 4 HOURS PRN
Status: DISCONTINUED | OUTPATIENT
Start: 2024-01-02 | End: 2024-01-08

## 2024-01-02 RX ORDER — SEVELAMER CARBONATE 800 MG/1
1600 TABLET, FILM COATED ORAL
Status: DISCONTINUED | OUTPATIENT
Start: 2024-01-02 | End: 2024-01-08

## 2024-01-02 RX ORDER — OXYCODONE HYDROCHLORIDE 10 MG/1
10 TABLET ORAL EVERY 4 HOURS PRN
Status: DISCONTINUED | OUTPATIENT
Start: 2024-01-02 | End: 2024-01-08

## 2024-01-02 RX ORDER — ATORVASTATIN CALCIUM 40 MG/1
40 TABLET, FILM COATED ORAL NIGHTLY
Status: DISCONTINUED | OUTPATIENT
Start: 2024-01-02 | End: 2024-01-08

## 2024-01-02 RX ORDER — ALBUMIN HUMAN 50 G/1000ML
SOLUTION INTRAVENOUS
Status: DISCONTINUED | OUTPATIENT
Start: 2024-01-02 | End: 2024-01-02

## 2024-01-02 RX ORDER — CEFAZOLIN 2 G/1
INJECTION, POWDER, FOR SOLUTION INTRAMUSCULAR; INTRAVENOUS
Status: DISCONTINUED | OUTPATIENT
Start: 2024-01-02 | End: 2024-01-02

## 2024-01-02 RX ORDER — IBUPROFEN 200 MG
24 TABLET ORAL
Status: DISCONTINUED | OUTPATIENT
Start: 2024-01-02 | End: 2024-02-06 | Stop reason: HOSPADM

## 2024-01-02 RX ADMIN — OXYCODONE HYDROCHLORIDE 5 MG: 5 TABLET ORAL at 03:01

## 2024-01-02 RX ADMIN — PROPOFOL 50 MG: 10 INJECTION, EMULSION INTRAVENOUS at 07:01

## 2024-01-02 RX ADMIN — ROCURONIUM BROMIDE 30 MG: 10 INJECTION INTRAVENOUS at 09:01

## 2024-01-02 RX ADMIN — CEFAZOLIN 2 EACH: 330 INJECTION, POWDER, FOR SOLUTION INTRAMUSCULAR; INTRAVENOUS at 07:01

## 2024-01-02 RX ADMIN — SODIUM CHLORIDE, SODIUM GLUCONATE, SODIUM ACETATE, POTASSIUM CHLORIDE, MAGNESIUM CHLORIDE, SODIUM PHOSPHATE, DIBASIC, AND POTASSIUM PHOSPHATE: .53; .5; .37; .037; .03; .012; .00082 INJECTION, SOLUTION INTRAVENOUS at 09:01

## 2024-01-02 RX ADMIN — Medication 100 MCG: at 07:01

## 2024-01-02 RX ADMIN — GABAPENTIN 400 MG: 100 CAPSULE ORAL at 05:01

## 2024-01-02 RX ADMIN — ROCURONIUM BROMIDE 50 MG: 10 INJECTION INTRAVENOUS at 07:01

## 2024-01-02 RX ADMIN — SEVELAMER CARBONATE 1600 MG: 800 TABLET, FILM COATED ORAL at 05:01

## 2024-01-02 RX ADMIN — SENNOSIDES AND DOCUSATE SODIUM 1 TABLET: 8.6; 5 TABLET ORAL at 09:01

## 2024-01-02 RX ADMIN — FENTANYL CITRATE 25 MCG: 50 INJECTION INTRAMUSCULAR; INTRAVENOUS at 01:01

## 2024-01-02 RX ADMIN — DEXAMETHASONE SODIUM PHOSPHATE 4 MG: 4 INJECTION INTRA-ARTICULAR; INTRALESIONAL; INTRAMUSCULAR; INTRAVENOUS; SOFT TISSUE at 07:01

## 2024-01-02 RX ADMIN — DEXMEDETOMIDINE 8 MCG: 200 INJECTION, SOLUTION INTRAVENOUS at 07:01

## 2024-01-02 RX ADMIN — ALBUMIN (HUMAN) 500 ML: 12.5 SOLUTION INTRAVENOUS at 09:01

## 2024-01-02 RX ADMIN — Medication 30 MG: at 07:01

## 2024-01-02 RX ADMIN — LIDOCAINE HYDROCHLORIDE 100 MG: 20 INJECTION, SOLUTION EPIDURAL; INFILTRATION; INTRACAUDAL at 07:01

## 2024-01-02 RX ADMIN — PREGABALIN 75 MG: 75 CAPSULE ORAL at 05:01

## 2024-01-02 RX ADMIN — ROCURONIUM BROMIDE 10 MG: 10 INJECTION INTRAVENOUS at 09:01

## 2024-01-02 RX ADMIN — SUGAMMADEX 400 MG: 100 INJECTION, SOLUTION INTRAVENOUS at 12:01

## 2024-01-02 RX ADMIN — DEXMEDETOMIDINE 8 MCG: 200 INJECTION, SOLUTION INTRAVENOUS at 08:01

## 2024-01-02 RX ADMIN — Medication 100 MCG: at 08:01

## 2024-01-02 RX ADMIN — OXYCODONE HYDROCHLORIDE 5 MG: 5 TABLET ORAL at 09:01

## 2024-01-02 RX ADMIN — CALCIUM CHLORIDE 0.3 G: 100 INJECTION, SOLUTION INTRAVENOUS at 10:01

## 2024-01-02 RX ADMIN — FENTANYL CITRATE 100 MCG: 50 INJECTION INTRAMUSCULAR; INTRAVENOUS at 07:01

## 2024-01-02 RX ADMIN — PANTOPRAZOLE SODIUM 40 MG: 40 TABLET, DELAYED RELEASE ORAL at 03:01

## 2024-01-02 RX ADMIN — SENNOSIDES AND DOCUSATE SODIUM 1 TABLET: 8.6; 5 TABLET ORAL at 03:01

## 2024-01-02 RX ADMIN — CALCIUM CHLORIDE 0.4 G: 100 INJECTION, SOLUTION INTRAVENOUS at 10:01

## 2024-01-02 RX ADMIN — PROPOFOL 100 MG: 10 INJECTION, EMULSION INTRAVENOUS at 07:01

## 2024-01-02 RX ADMIN — ROCURONIUM BROMIDE 10 MG: 10 INJECTION INTRAVENOUS at 08:01

## 2024-01-02 RX ADMIN — FENTANYL CITRATE 50 MCG: 50 INJECTION INTRAMUSCULAR; INTRAVENOUS at 10:01

## 2024-01-02 RX ADMIN — ROCURONIUM BROMIDE 20 MG: 10 INJECTION INTRAVENOUS at 10:01

## 2024-01-02 RX ADMIN — ATORVASTATIN CALCIUM 40 MG: 40 TABLET, FILM COATED ORAL at 09:01

## 2024-01-02 RX ADMIN — GLYCOPYRROLATE 0.2 MG: 0.2 INJECTION INTRAMUSCULAR; INTRAVENOUS at 07:01

## 2024-01-02 RX ADMIN — SODIUM CHLORIDE: 0.9 INJECTION, SOLUTION INTRAVENOUS at 06:01

## 2024-01-02 RX ADMIN — ACETAMINOPHEN 1000 MG: 500 TABLET ORAL at 05:01

## 2024-01-02 RX ADMIN — Medication 10 MG: at 08:01

## 2024-01-02 RX ADMIN — NOREPINEPHRINE BITARTRATE 8 MCG: 1 INJECTION, SOLUTION, CONCENTRATE INTRAVENOUS at 07:01

## 2024-01-02 RX ADMIN — ONDANSETRON 4 MG: 2 INJECTION INTRAMUSCULAR; INTRAVENOUS at 11:01

## 2024-01-02 RX ADMIN — CALCIUM CHLORIDE 0.3 G: 100 INJECTION, SOLUTION INTRAVENOUS at 09:01

## 2024-01-02 RX ADMIN — Medication 10 MG: at 10:01

## 2024-01-02 RX ADMIN — FENTANYL CITRATE 50 MCG: 50 INJECTION INTRAMUSCULAR; INTRAVENOUS at 08:01

## 2024-01-02 NOTE — SUBJECTIVE & OBJECTIVE
Interval HPI:   No acute events overnight. Patient in room NOM 2ND FLR Periop Pool*. Pt presents to PACU s/p wedge resection of R lung. No insulin orders at time of consult, BG WNL.     Steroid use- Dexamethasone  8 mg intra-op.   Day of Surgery  Renal function- Abnormal - cr 4.9    Vasopressors-  None     Diet clear liquid  Diet diabetic Renal; 2000 Calorie     Eating:   <25%  Hypoglycemia and intervention: No  Fever: No  TPN and/or TF: No    PMH, PSH, FH, SH updated and reviewed     ROS:  Review of Systems    Current Medications and/or Treatments Impacting Glycemic Control  Immunotherapy:    Immunosuppressants       None          Steroids:   Hormones (From admission, onward)      None          Pressors:    Autonomic Drugs (From admission, onward)      None          Hyperglycemia/Diabetes Medications:   Antihyperglycemics (From admission, onward)      Start     Stop Route Frequency Ordered    01/02/24 1338  insulin aspart U-100 pen 0-5 Units         -- SubQ Every 4 hours PRN 01/02/24 1238             PHYSICAL EXAMINATION:  Vitals:    01/02/24 1430   BP: (!) 146/69   Pulse: 68   Resp: 15   Temp:      Body mass index is 21.97 kg/m².     Physical Exam  Constitutional:       General: She is not in acute distress.     Appearance: She is not ill-appearing.   Eyes:      Conjunctiva/sclera: Conjunctivae normal.   Pulmonary:      Effort: No respiratory distress.   Psychiatric:         Mood and Affect: Mood normal.         Behavior: Behavior normal.

## 2024-01-02 NOTE — HPI
Reason for Consult: Management of T2DM, Hyperglycemia     Surgical Procedure and Date: 1/2/24--   LYMPHADENECTOMY (Right)  THORACOTOMY (Right)  THORACOTOMY, WITH INITIAL THERAPEUTIC WEDGE RESECTION OF LUNG (Right)    Diabetes diagnosis year: in her 50s    Home Diabetes Medications:    LDC SSI   Blood sugar 150 to 200, + 1 unit  Blood sugar 201 to 250, + 2 units  Blood sugar 251 to 300, + 3 units  Blood sugar 301 to 350, + 4 units   Blood sugar greater than 350, + 5 units    How often checking glucose at home? >4 x day   BG readings on regimen: 100-120s  Hypoglycemia on the regimen?  denies   Missed doses on regimen?  No    Diabetes Complications include:     Nephropathy     Complicating diabetes co morbidities:   ESRD and Glucocorticoid use       HPI:   Patient is a 73 y.o. female with DDD, benign essential tremor, meniere's disease, HTN, HLD, DM2, GERD, Celiac's, IBS who is found to have RML Carcinoid tumor.  Chest CT 10/18/23 revealed 1.2 x 1.1 cm right middle lobe nodule, previously 1.2 x 1.0 cm in July 2023. Underwent Robotic Bronchoscopy 10/20/23; path: RML nodule positive for well-differentiated neuroendocrine neoplasm, carcinoid tumor. PET/CT Cu64 11/17/23 showed known RML nodule with hypermetabolic activity. Now presents s/p thoracotomy with wedge resection of right lung. Endocrine consulted for BG management.

## 2024-01-02 NOTE — HPI
Patient is a 73 y.o. 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 S/p thoracotomy. Last HD prior to presentation 1/1/24. Nephrology consulted for ESRD on HD.

## 2024-01-02 NOTE — ANESTHESIA PROCEDURE NOTES
Intubation    Date/Time: 1/2/2024 7:10 AM    Performed by: Marcelo Villegas MD  Authorized by: Manuel Luna MD    Intubation:     Induction:  Intravenous    Intubated:  Postinduction    Mask Ventilation:  Easy with oral airway    Attempts:  1    Attempted By:  Resident anesthesiologist    Method of Intubation:  Video laryngoscopy and fiberoptic    Blade:  [place] 3    Laryngeal View Grade: Grade IIA - cords partially seen      Difficult Airway Encountered?: No      Complications:  None    Airway Device:  Double lumen tube left    Airway Device Size:  35F    Style/Cuff Inflation:  Cuffed (inflated to minimal occlusive pressure)    Tube secured:  27    Secured at:  The lips    Placement Verified By:  Capnometry, Revisualization with laryngoscopy and Fiber optic visualization    Complicating Factors:  None    Findings Post-Intubation:  BS equal bilateral and atraumatic/condition of teeth unchanged       1 person assist

## 2024-01-02 NOTE — PLAN OF CARE
[facility] Plan of Care Note  Dx Final diagnoses:  [D3A.090] Carcinoid tumor of lung (Primary)      Shift Events arrived from PACU. Pain under control with PRNs. ICS & up in chair/ambulation encouraged    Neuro: A&O4    Vital Signs: VSS    Respiratory: RA    Diet: clears    Urine Output/Bowel Movement: no bm, pt on HD    Activity level? X1     Any scheduled procedures? no    Any safety concerns? no    Problem: Adult Inpatient Plan of Care  Goal: Plan of Care Review  Outcome: Ongoing, Progressing  Goal: Patient-Specific Goal (Individualized)  Outcome: Ongoing, Progressing  Goal: Absence of Hospital-Acquired Illness or Injury  Outcome: Ongoing, Progressing  Goal: Optimal Comfort and Wellbeing  Outcome: Ongoing, Progressing  Goal: Readiness for Transition of Care  Outcome: Ongoing, Progressing     Problem: Diabetes Comorbidity  Goal: Blood Glucose Level Within Targeted Range  Outcome: Ongoing, Progressing     Problem: Infection  Goal: Absence of Infection Signs and Symptoms  Outcome: Ongoing, Progressing     Problem: Device-Related Complication Risk (Hemodialysis)  Goal: Safe, Effective Therapy Delivery  Outcome: Ongoing, Progressing     Problem: Hemodynamic Instability (Hemodialysis)  Goal: Effective Tissue Perfusion  Outcome: Ongoing, Progressing     Problem: Infection (Hemodialysis)  Goal: Absence of Infection Signs and Symptoms  Outcome: Ongoing, Progressing     Problem: Fall Injury Risk  Goal: Absence of Fall and Fall-Related Injury  Outcome: Ongoing, Progressing

## 2024-01-02 NOTE — CONSULTS
[facility] ([facility])  Nephrology  Consult Note    Patient Name: Lily Green  MRN: [id number]  Admission Date: 2024  Hospital Length of Stay: 0 days  Attending Provider: Tan Thomson MD   Primary Care Physician: Pavel Calixto MD  Principal Problem:<principal problem not specified>    Inpatient consult to Nephrology  Consult performed by: Christy Gaytan DNP  Consult ordered by: Brooklyn Arnold PA-C  Reason for consult: ESRD        Subjective:     HPI: Patient is a 73 y.o. 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 S/p thoracotomy. Last HD prior to presentation 24. Nephrology consulted for ESRD on HD.        Past Medical History:   Diagnosis Date    Anxiety     Celiac disease 2018    Celiac disease     Depression     Diabetes mellitus     Family history of breast cancer in mother      at age 68    Hyperlipidemia     Hypertension     Meniere disease     Meniere's disease, unspecified ear     Menopause     Peptic ulcer     Reflux esophagitis     Urinary tract infection     Vaginal infection     Vaginal Pap smear 2014    Pap/Hpv Negative        Past Surgical History:   Procedure Laterality Date    APPENDECTOMY      BREAST SURGERY      Tags    CARPAL TUNNEL RELEASE Bilateral 2017    ,     CLOSURE, COLOSTOMY Left 2023    Procedure: CLOSURE, COLOSTOMY;  Surgeon: Manuel Javier MD;  Location: [facility] OR;  Service: General;  Laterality: Left;    COLONOSCOPY  2018    Normal  ( Juan A)     COLOSTOMY Right 2023    Procedure: CREATION, COLOSTOMY;  Surgeon: Manuel Javier MD;  Location: [facility] OR;  Service: General;  Laterality: Right;    DILATION AND CURETTAGE OF UTERUS  1986    DUPUYTREN CONTRACTURE RELEASE Bilateral 2017    HYSTERECTOMY  1987    BEAU w/ appy, no BSO     INJECTION OF NEUROLYTIC AGENT AROUND LUMBAR  SYMPATHETIC NERVE N/A 1/6/2022    Procedure: BLOCK, LUMBAR SYMPATHETIC;  Surgeon: Souleymane Rizo Jr., MD;  Location: [facility]GT;  Service: Pain Management;  Laterality: N/A;  no pacemaker   pt is diabetic     INJECTION OF NEUROLYTIC AGENT AROUND LUMBAR SYMPATHETIC NERVE N/A 8/25/2022    Procedure: BLOCK, LUMBAR SYMPATHETIC;  Surgeon: Souleymane Rizo Jr., MD;  Location: [facility]IN [facility]GT;  Service: Pain Management;  Laterality: N/A;  diabetic     INSERTION OF TUNNELED CENTRAL VENOUS HEMODIALYSIS CATHETER Right 1/25/2023    Procedure: INSERTION, CATHETER, HEMODIALYSIS, DUAL LUMEN;  Surgeon: Manuel Javier MD;  Location: [facility] OR;  Service: General;  Laterality: Right;    INSERTION, CATHETER, TUNNELED Left 1/28/2023    Procedure: Insertion,catheter,tunneled;  Surgeon: Watson Fontenot MD;  Location: [facility];  Service: General;  Laterality: Left;    LAPAROTOMY, EXPLORATORY N/A 1/14/2023    Procedure: LAPAROTOMY, EXPLORATORY;  Surgeon: Manuel Javier MD;  Location: [facility] OR;  Service: General;  Laterality: N/A;    LAPAROTOMY, EXPLORATORY N/A 1/16/2023    Procedure: LAPAROTOMY, EXPLORATORY;  Surgeon: Manuel Javier MD;  Location: [facility] OR;  Service: General;  Laterality: N/A;    REMOVAL OF CATHETER Right 1/28/2023    Procedure: REMOVAL, CATHETER;  Surgeon: Watson Fontenot MD;  Location: [facility] OR;  Service: General;  Laterality: Right;    REMOVAL, TUNNELED CATH Right 1/25/2023    Procedure: REMOVAL, TUNNELED CATH;  Surgeon: Manuel Javier MD;  Location: [facility] OR;  Service: General;  Laterality: Right;    ROBOTIC BRONCHOSCOPY N/A 10/20/2023    Procedure: ROBOTIC BRONCHOSCOPY;  Surgeon: Mayda Monzon MD;  Location: [address];  Service: Pulmonary;  Laterality: N/A;    SHOULDER SURGERY  2009 & 2010    right rotator cuff    TONSILLECTOMY      UPPER GASTROINTESTINAL ENDOSCOPY  04/2018       Review of patient's allergies indicates:   Allergen Reactions    Crestor [rosuvastatin] Swelling    Gluten protein       Current Facility-Administered Medications   Medication Frequency    [START ON 1/3/2024] 0.9%  NaCl infusion Once    acetaminophen tablet 650 mg Q4H PRN    atorvastatin tablet 40 mg QHS    bisacodyL suppository 10 mg Daily PRN    dextrose 10% bolus 125 mL 125 mL PRN    dextrose 10% bolus 250 mL 250 mL PRN    glucagon (human recombinant) injection 1 mg PRN    glucose chewable tablet 16 g PRN    glucose chewable tablet 24 g PRN    [START ON 1/3/2024] heparin (porcine) injection 5,000 Units Q8H    insulin aspart U-100 pen 0-5 Units Q4H PRN    ondansetron disintegrating tablet 8 mg Q8H PRN    oxyCODONE immediate release tablet 5 mg Q4H PRN    oxyCODONE immediate release tablet Tab 10 mg Q4H PRN    pantoprazole EC tablet 40 mg Daily    polyethylene glycol packet 17 g Daily    pregabalin capsule 75 mg Daily    prochlorperazine injection Soln 5 mg Q30 Min PRN    senna-docusate 8.6-50 mg per tablet 1 tablet BID    sevelamer carbonate tablet 1,600 mg TID WM     Family History       Problem Relation (Age of Onset)    Arthritis Father    Breast cancer Mother, Paternal Aunt    Cancer Mother, Paternal Aunt    Diabetes Paternal Grandfather    Hyperlipidemia Sister    Vision loss Father, Mother, Sister          Tobacco Use    Smoking status: Never    Smokeless tobacco: Never   Substance and Sexual Activity    Alcohol use: No    Drug use: Never    Sexual activity: Not Currently     Partners: Male     Birth control/protection: See Surgical Hx     Comment: :      Review of Systems   Constitutional: Negative.    HENT: Negative.     Eyes: Negative.    Respiratory: Negative.     Cardiovascular: Negative.    Gastrointestinal: Negative.    Endocrine: Negative.    Genitourinary:  Positive for decreased urine volume.   Musculoskeletal: Negative.    Neurological: Negative.    Hematological: Negative.  Negative for adenopathy.   Psychiatric/Behavioral: Negative.       Objective:     Vital Signs (Most Recent):  Temp: 98.1 °F (36.7  °C) (01/02/24 1245)  Pulse: 68 (01/02/24 1345)  Resp: 13 (01/02/24 1345)  BP: 122/66 (01/02/24 1345)  SpO2: 100 % (01/02/24 1345) Vital Signs (24h Range):  Temp:  [98.1 °F (36.7 °C)-98.3 °F (36.8 °C)] 98.1 °F (36.7 °C)  Pulse:  [58-70] 68  Resp:  [13-18] 13  SpO2:  [98 %-100 %] 100 %  BP: (122-151)/(63-67) 122/66     Weight: 59.9 kg (132 lb) (01/02/24 0607)  Body mass index is 21.97 kg/m².  Body surface area is 1.66 meters squared.    No intake/output data recorded.     Physical Exam  Vitals and nursing note reviewed.   HENT:      Mouth/Throat:      Pharynx: Oropharynx is clear.   Eyes:      Conjunctiva/sclera: Conjunctivae normal.   Cardiovascular:      Pulses: Normal pulses.   Pulmonary:      Effort: Pulmonary effort is normal.      Comments: CT  Abdominal:      Palpations: Abdomen is soft.   Musculoskeletal:      Right lower leg: No edema.      Left lower leg: No edema.   Skin:     Comments: LORELEI HUTTON   Neurological:      Mental Status: She is alert and oriented to person, place, and time.   Psychiatric:         Mood and Affect: Mood normal.          Significant Labs:  CBC:   Recent Labs   Lab 01/02/24  1242   WBC 12.68   RBC 3.92*   HGB 11.8*   HCT 36.1*   *   MCV 92   MCH 30.1   MCHC 32.7     CMP:   Recent Labs   Lab 01/02/24  0626 01/02/24  1242    183*   CALCIUM 10.5 9.8   ALBUMIN 4.0 3.7   PROT 8.7*  --     139   K 5.7* 4.5   CO2 30* 25   CL 92* 100   BUN 31* 32*   CREATININE 4.6* 4.3*   ALKPHOS 108  --    ALT 29  --    AST 29  --    BILITOT 0.8  --      All labs within the past 24 hours have been reviewed.        Assessment/Plan:     Renal/  ESRD (end stage renal disease)  S/p R thoracotomy due to RML carcinoid tumor. On HD ~ 1 year. Last HD prior to presentation 1/1/24. Pt has NELY AVF placed on 10/23 that has not been cleared by vascular. Currently using RI TDC.     Nephrology History  iHD Schedule: TTS   Unit/MD: [facility]/ Dr. Vyas  Duration: 3.5 hours   UF:   EDW: 58 kg    Access: RIJ TDC  Residual Renal Function: minimal     Assessment:   - Plan for iHD 1/3/24  - midodrine as needed with HD   - continue torsemide   - continue sevelamer 800 TIDWM   - Hgb goal 10-11, continue epo with HD   - Renal diet, if not NPO    -pre and post HD weight   - Continue to monitor intake and output, daily weights   - Avoid nephrotoxic medication and renal dose medications to GFR                      Oncology  Malignant carcinoid tumor of bronchus  -management per primary     Endocrine  Type 2 diabetes mellitus with diabetic polyneuropathy, without long-term current use of insulin  -management per primary         Thank you for your consult. I will follow-up with patient. Please contact us if you have any additional questions.    Christy Gaytan, BRITTANI  Nephrology  [facility] - [facility] (2nd Fl)

## 2024-01-02 NOTE — SUBJECTIVE & OBJECTIVE
Past Medical History:   Diagnosis Date    Anxiety     Celiac disease 2018    Celiac disease     Depression     Diabetes mellitus     Family history of breast cancer in mother      at age 68    Hyperlipidemia     Hypertension     Meniere disease     Meniere's disease, unspecified ear     Menopause     Peptic ulcer     Reflux esophagitis     Urinary tract infection     Vaginal infection     Vaginal Pap smear 2014    Pap/Hpv Negative        Past Surgical History:   Procedure Laterality Date    APPENDECTOMY      BREAST SURGERY      Tags    CARPAL TUNNEL RELEASE Bilateral 2017    ,     CLOSURE, COLOSTOMY Left 2023    Procedure: CLOSURE, COLOSTOMY;  Surgeon: Manuel Javier MD;  Location: [facility] OR;  Service: General;  Laterality: Left;    COLONOSCOPY  2018    Normal  ( Juan A)     COLOSTOMY Right 2023    Procedure: CREATION, COLOSTOMY;  Surgeon: Manuel Javier MD;  Location: [facility] OR;  Service: General;  Laterality: Right;    DILATION AND CURETTAGE OF UTERUS  1986    DUPUYTREN CONTRACTURE RELEASE Bilateral 2017    HYSTERECTOMY      BEAU w/ appy, no BSO     INJECTION OF NEUROLYTIC AGENT AROUND LUMBAR SYMPATHETIC NERVE N/A 2022    Procedure: BLOCK, LUMBAR SYMPATHETIC;  Surgeon: Souleymane Rizo Jr., MD;  Location: [facility]IN [facility]GT;  Service: Pain Management;  Laterality: N/A;  no pacemaker   pt is diabetic     INJECTION OF NEUROLYTIC AGENT AROUND LUMBAR SYMPATHETIC NERVE N/A 2022    Procedure: BLOCK, LUMBAR SYMPATHETIC;  Surgeon: Souleymane Rizo Jr., MD;  Location: [facility];  Service: Pain Management;  Laterality: N/A;  diabetic     INSERTION OF TUNNELED CENTRAL VENOUS HEMODIALYSIS CATHETER Right 2023    Procedure: INSERTION, CATHETER, HEMODIALYSIS, DUAL LUMEN;  Surgeon: Manuel Javier MD;  Location: [facility] OR;  Service: General;  Laterality: Right;    INSERTION, CATHETER, TUNNELED Left 2023    Procedure: Insertion,catheter,tunneled;  Surgeon: Watson  CHANTAL Fontenot MD;  Location: [facility] OR;  Service: General;  Laterality: Left;    LAPAROTOMY, EXPLORATORY N/A 1/14/2023    Procedure: LAPAROTOMY, EXPLORATORY;  Surgeon: Manuel Javier MD;  Location: [facility] OR;  Service: General;  Laterality: N/A;    LAPAROTOMY, EXPLORATORY N/A 1/16/2023    Procedure: LAPAROTOMY, EXPLORATORY;  Surgeon: Manuel Javier MD;  Location: [facility] OR;  Service: General;  Laterality: N/A;    REMOVAL OF CATHETER Right 1/28/2023    Procedure: REMOVAL, CATHETER;  Surgeon: Watson Fontenot MD;  Location: [facility] OR;  Service: General;  Laterality: Right;    REMOVAL, TUNNELED CATH Right 1/25/2023    Procedure: REMOVAL, TUNNELED CATH;  Surgeon: Manuel Javier MD;  Location: [facility] OR;  Service: General;  Laterality: Right;    ROBOTIC BRONCHOSCOPY N/A 10/20/2023    Procedure: ROBOTIC BRONCHOSCOPY;  Surgeon: Mayda Monzon MD;  Location: [address];  Service: Pulmonary;  Laterality: N/A;    SHOULDER SURGERY  2009 & 2010    right rotator cuff    TONSILLECTOMY      UPPER GASTROINTESTINAL ENDOSCOPY  04/2018       Review of patient's allergies indicates:   Allergen Reactions    Crestor [rosuvastatin] Swelling    Gluten protein      Current Facility-Administered Medications   Medication Frequency    [START ON 1/3/2024] 0.9%  NaCl infusion Once    acetaminophen tablet 650 mg Q4H PRN    atorvastatin tablet 40 mg QHS    bisacodyL suppository 10 mg Daily PRN    dextrose 10% bolus 125 mL 125 mL PRN    dextrose 10% bolus 250 mL 250 mL PRN    glucagon (human recombinant) injection 1 mg PRN    glucose chewable tablet 16 g PRN    glucose chewable tablet 24 g PRN    [START ON 1/3/2024] heparin (porcine) injection 5,000 Units Q8H    insulin aspart U-100 pen 0-5 Units Q4H PRN    ondansetron disintegrating tablet 8 mg Q8H PRN    oxyCODONE immediate release tablet 5 mg Q4H PRN    oxyCODONE immediate release tablet Tab 10 mg Q4H PRN    pantoprazole EC tablet 40 mg Daily    polyethylene glycol packet 17 g Daily     pregabalin capsule 75 mg Daily    prochlorperazine injection Soln 5 mg Q30 Min PRN    senna-docusate 8.6-50 mg per tablet 1 tablet BID    sevelamer carbonate tablet 1,600 mg TID WM     Family History       Problem Relation (Age of Onset)    Arthritis Father    Breast cancer Mother, Paternal Aunt    Cancer Mother, Paternal Aunt    Diabetes Paternal Grandfather    Hyperlipidemia Sister    Vision loss Father, Mother, Sister          Tobacco Use    Smoking status: Never    Smokeless tobacco: Never   Substance and Sexual Activity    Alcohol use: No    Drug use: Never    Sexual activity: Not Currently     Partners: Male     Birth control/protection: See Surgical Hx     Comment: :      Review of Systems   Constitutional: Negative.    HENT: Negative.     Eyes: Negative.    Respiratory: Negative.     Cardiovascular: Negative.    Gastrointestinal: Negative.    Endocrine: Negative.    Genitourinary:  Positive for decreased urine volume.   Musculoskeletal: Negative.    Neurological: Negative.    Hematological: Negative.  Negative for adenopathy.   Psychiatric/Behavioral: Negative.       Objective:     Vital Signs (Most Recent):  Temp: 98.1 °F (36.7 °C) (01/02/24 1245)  Pulse: 68 (01/02/24 1345)  Resp: 13 (01/02/24 1345)  BP: 122/66 (01/02/24 1345)  SpO2: 100 % (01/02/24 1345) Vital Signs (24h Range):  Temp:  [98.1 °F (36.7 °C)-98.3 °F (36.8 °C)] 98.1 °F (36.7 °C)  Pulse:  [58-70] 68  Resp:  [13-18] 13  SpO2:  [98 %-100 %] 100 %  BP: (122-151)/(63-67) 122/66     Weight: 59.9 kg (132 lb) (01/02/24 0607)  Body mass index is 21.97 kg/m².  Body surface area is 1.66 meters squared.    No intake/output data recorded.     Physical Exam  Vitals and nursing note reviewed.   HENT:      Mouth/Throat:      Pharynx: Oropharynx is clear.   Eyes:      Conjunctiva/sclera: Conjunctivae normal.   Cardiovascular:      Pulses: Normal pulses.   Pulmonary:      Effort: Pulmonary effort is normal.      Comments: CT  Abdominal:      Palpations:  Abdomen is soft.   Musculoskeletal:      Right lower leg: No edema.      Left lower leg: No edema.   Skin:     Comments: LORELEI HUTTON   Neurological:      Mental Status: She is alert and oriented to person, place, and time.   Psychiatric:         Mood and Affect: Mood normal.          Significant Labs:  CBC:   Recent Labs   Lab 01/02/24  1242   WBC 12.68   RBC 3.92*   HGB 11.8*   HCT 36.1*   *   MCV 92   MCH 30.1   MCHC 32.7     CMP:   Recent Labs   Lab 01/02/24  0626 01/02/24  1242    183*   CALCIUM 10.5 9.8   ALBUMIN 4.0 3.7   PROT 8.7*  --     139   K 5.7* 4.5   CO2 30* 25   CL 92* 100   BUN 31* 32*   CREATININE 4.6* 4.3*   ALKPHOS 108  --    ALT 29  --    AST 29  --    BILITOT 0.8  --      All labs within the past 24 hours have been reviewed.

## 2024-01-02 NOTE — TRANSFER OF CARE
"Anesthesia Transfer of Care Note    Patient: Lily Green    Procedure(s) Performed: Procedure(s) (LRB):  LYMPHADENECTOMY (Right)  THORACOTOMY (Right)  THORACOTOMY, WITH INITIAL THERAPEUTIC WEDGE RESECTION OF LUNG (Right)    Patient location: PACU    Anesthesia Type: general    Transport from OR: Transported from OR on 6-10 L/min O2 by face mask with adequate spontaneous ventilation    Post pain: adequate analgesia    Post assessment: no apparent anesthetic complications and tolerated procedure well    Post vital signs: stable    Level of consciousness: awake    Nausea/Vomiting: no nausea/vomiting    Complications: none    Transfer of care protocol was followed      Last vitals: Visit Vitals  BP (!) 151/67 (BP Location: Right arm, Patient Position: Lying)   Pulse (!) 58   Temp 36.8 °C (98.3 °F) (Oral)   Resp 18   Ht 5' 5" (1.651 m)   Wt 59.9 kg (132 lb)   LMP  (LMP Unknown)   SpO2 98%   Breastfeeding No   BMI 21.97 kg/m²     "

## 2024-01-02 NOTE — OP NOTE
Thoracic Surgery Operative Report       Date:*  01/02/2024     Preoperative Diagnosis: Right middle Lobe carcinoid tumor     Postoperative Diagnosis: Same      Procedure Performed:   1. Flexible Bronchoscopy  2. Robotic-Assisted surgery converted to right thoracotomy with therapeutic wedge resection  3. Open Mediastinal Lymph Node Dissection  4. Intercostal Nerve Blocks.       Intraoperative Findings:  Right middle lobe tumor with no local invasion. Normal appearing medistinal nodes.        Surgeon: Tan Carlos M.D. , Silver Ardon M.D.     Fellow/Resident: Woo Burk M.D.       Assistant:   Bedside assistant attestation:  Brooklyn Arnold PA-C functioned as a bedside 1st assistant.  Her duties included robotic docking, instrument exchange, and specimen retrieval throughout the robotic portion of this surgery.  There was no qualified resident available to function as a bedside first assistant given the case complexity and extent of duties described above.      Anesthesia Type: General Anesthesia.       Estimated Blood Loss: 750 mL.      Intravenous Fluid: See Anesthesia Record.       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 TAN CARLOS    Release to patient Immediate        01/02/24 1222                      Drains: 24 Israeli Chest Tube.      Complications: None.        Disposition: The patient tolerated the operation well and was transported the postanesthesia care unit in stable condition.       Indication for the procedure:   73-year-old woman presents with 1.2cm RML biopsy-proven carcinoid tumor.  Patient underwent preoperative PET, CT that showed no evidence of locally advanced disease.  She passed all his preoperative testing and was found to be a suitable candidate for anatomic lung resection. The risks, benefits, and alternatives were discussed with the patient who consented to surgery.    Description of the operation:   Mrs. Green was transferred to the operating room. Intravenous lines and an arterial line were placed the anesthesia team. The patient was intubated with a single-lumen endotracheal tube by anesthesia. General anesthesia was induced. The patient received IV Cefazolin prior to the incision for antimicrobial prophylaxis. Compression boots were placed in the lower extremities with DVT prophylaxis. After a time out, a flexible bronchoscopy was performed. The flexible bronchoscope was advanced through the endotracheal tube advanced to the yo. The yo appeared to be sharp. The right mainstem bronchus was within normal limits. The right upper lobe bronchial orifice was normal. The bronchus intermedius was normal. The right middle lobe and right lower lobe bronchial orifices were normal. Left mainstem bronchus was entered and was normal. The left upper lobe and left lower lobe bronchial orifices were normal. The patient was then intubated with a double-lumen endotracheal tube and positioned in the left lateral decubitus position with the right chest facing upward. Secretions were noted to be minimal.      The right chest was prepped and draped in the standard sterile surgical fashion. A total of three 8 mm and one 12mm  robotic trocars were placed along the 8th intercostal space approximately 8-10 cm apart. The anterior 8mm port was upsized to 12 mm port  for the robotic stapler. A 12 mm Air Seal Port was placed in the 10th intercostal space. The 8mm 30 degree robotic camera was placed thorough the posterior axillary line trocar and the right hemithorax was inspected and there was no evidence of pleural effusion or pleural dissemination.The Raffstari Xi robot was then docked to the robotic ports. A Cadiere grasper, a tip up fenestrated lung grasper, and a bipolar forcep were placed through the ports.      The surgery began by taking down the  the inferior pulmonary  ligament. A level 9 lymph node was dissected and sent for permanent pathology. While dividing the inferior pulmonary ligament with the bipolar the inferior pulmonary vein we inadvertently injured. The bleeding was controlled however approximately 100cc of blood was lost. With control of the vessel this time the OR staff was instructed to open the thoracotomy tray, anesthesiology team was instructed to have staff in the room and to call for blood products and I called an additional CT surgeon attending into the room, Dr. Silver Ardon.     All but the 2nd robotic arm (Cadiere) were removed. A standard posterolateral thoracotomy was performed, and the latissimus was divided with electrocautery.  The serratus anterior muscle was not spared, and the chest was entered through the 6th intercostal space.  A Finochietto rib  was used to aid visualization within the right hemithorax.  On entry into the chest, the lung was reflected cephalad and a sponge-stick was used to apply direct the pressure to the injury while simultaneously retracting the sponge roll being held by the Cadiere. While we did initially have control, when undocking the second arm the pressure was lost and approximately 500cc of blood were lost before we were able to regain control. Once we did have control, Dr. Silver Ardon performed a suture repair of the vein injury using a prolene suture. The pericardium was used to buttress the  repair. In addition we noted a retraction injury to a small vein that required suture ligation.   With the bleeding controlled we then continued our initial operation. Anesthesiology was instructed to give blood products.    We performed a posterior mediastinal lymph node dissection and harvested a large level 7 lymph node. We then reflected the lung inferiorly and level 2 and 4 lymph node stations were also sampled.  Small feeding vessels were clipped using a medium clip applier. The nodule was easily palpable and fortunately the patient had a very well defined and thin fissure. We divided the medial aspect of the minor fissure with electrocautery which made the middle lobe more mobile. With the fissure divided we were able to perform a lung sparing, therapeutic wedge resection of the right middle lobe carcinoid. This was done with multiple firings of the Endo-VANDA stapler using a purple load. The specimen was examined on the back table and there appear to be at least 1.5 cm margin from the staple line.       The right chest including the staple line, was then meticulously suctioned. There was no evidence of bleeding. Intercostal nerve blocks were performed with Exparel injections posteriorly from T4-9. A 24 Nepali chest tube was placed in the 8th intercostal space and secured with a #0 Ethibond suture. There was a fracture noted of the 6th rib so this area was shingled and a small piece of the rib removed. Next a total of 3 pericostal Ti-Cron sutures were placed to reapproximate the ribs.  The wound was then closed in layers. The deep muscle layer was reapproximated with a 0.  The deep dermal layer was closed with a running 2-0 Vicryl suture, followed by 4-0 monocryl running subcuticular suture to reapproximate the skin. The sponge and instrument counts was correct x 2. I was present and participated for the entire procedure. The patient was transported to the post anesthesia care unit in stable condition. The  patient was extubated in the operating room and was transported to the PACU by the anesthesia team. There were no intraoperative surgical complications.

## 2024-01-02 NOTE — BRIEF OP NOTE
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 Repaired primarily with 4-0 prolene with over sewn pericardium. Parenchymal bleeding controlled with over sew.   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 TAN CARLOS    Release to patient Immediate        Pending                            Condition:  Stable    Disposition: PACU - hemodynamically stable.    Attestation: I was present and scrubbed for the entire procedure.

## 2024-01-02 NOTE — OPERATIVE NOTE ADDENDUM
Certification of Assistant at Surgery       Surgery Date: 1/2/2024     Participating Surgeons:  Surgeon(s) and Role:     * Tan Thomson MD - Primary     * Moses Burk MD - Fellow     * Silver Ardon MD - Consult     * Kat Flores MD    Procedures:  Procedure(s) (LRB):  LYMPHADENECTOMY (Right)  THORACOTOMY (Right)  THORACOTOMY, WITH INITIAL THERAPEUTIC WEDGE RESECTION OF LUNG (Right)    Assistant Surgeon's Certification of Necessity:  I understand that section 1842 (b) (6) (d) of the Social Security Act generally prohibits Medicare Part B reasonable charge payment for the services of assistants at surgery in teaching hospitals when qualified residents are available to furnish such services. I certify that the services for which payment is claimed were medically necessary, and that no qualified resident was available to perform the services. I further understand that these services are subject to post-payment review by the Medicare carrier.      Brooklyn Arnold PA-C    01/02/2024  12:26 PM

## 2024-01-02 NOTE — NURSING
Nurses Note -- 4 Eyes      1/2/2024   4:48 PM      Skin assessed during: Admit      [x] No Altered Skin Integrity Present    [x]Prevention Measures Documented- incision + lap sites       [] Yes- Altered Skin Integrity Present or Discovered   [] LDA Added if Not in Epic (Describe Wound)   [] New Altered Skin Integrity was Present on Admit and Documented in LDA   [] Wound Image Taken    Wound Care Consulted? No    Attending Nurse:  Rafaela Verma RN/Staff Member:   Miguel Angel

## 2024-01-02 NOTE — CONSULTS
[facility] - Surgery ([facility])  Endocrinology  Diabetes Consult Note    Consult Requested by: Tan Thomson MD   Reason for admit: <principal problem not specified>    HISTORY OF PRESENT ILLNESS:  Reason for Consult: Management of T2DM, Hyperglycemia     Surgical Procedure and Date: 1/2/24--   LYMPHADENECTOMY (Right)  THORACOTOMY (Right)  THORACOTOMY, WITH INITIAL THERAPEUTIC WEDGE RESECTION OF LUNG (Right)    Diabetes diagnosis year: in her 50s    Home Diabetes Medications:    LDC SSI   Blood sugar 150 to 200, + 1 unit  Blood sugar 201 to 250, + 2 units  Blood sugar 251 to 300, + 3 units  Blood sugar 301 to 350, + 4 units   Blood sugar greater than 350, + 5 units    How often checking glucose at home? >4 x day   BG readings on regimen: 100-120s  Hypoglycemia on the regimen?  denies   Missed doses on regimen?  No    Diabetes Complications include:     Nephropathy     Complicating diabetes co morbidities:   ESRD and Glucocorticoid use       HPI:   Patient is a 73 y.o. female with DDD, benign essential tremor, meniere's disease, HTN, HLD, DM2, GERD, Celiac's, IBS who is found to have RML Carcinoid tumor.  Chest CT 10/18/23 revealed 1.2 x 1.1 cm right middle lobe nodule, previously 1.2 x 1.0 cm in July 2023. Underwent Robotic Bronchoscopy 10/20/23; path: RML nodule positive for well-differentiated neuroendocrine neoplasm, carcinoid tumor. PET/CT Cu64 11/17/23 showed known RML nodule with hypermetabolic activity. Now presents s/p thoracotomy with wedge resection of right lung. Endocrine consulted for BG management.         Interval HPI:   No acute events overnight. Patient in room [address] Periop Pool*. Pt presents to PACU s/p wedge resection of R lung. No insulin orders at time of consult, BG WNL.     Steroid use- Dexamethasone  8 mg intra-op.   Day of Surgery  Renal function- Abnormal - cr 4.9    Vasopressors-  None     Diet clear liquid  Diet diabetic Renal; 2000 Calorie     Eating:   <25%  Hypoglycemia and  "intervention: No  Fever: No  TPN and/or TF: No    PMH, PSH, FH, SH updated and reviewed     ROS:  Review of Systems    Current Medications and/or Treatments Impacting Glycemic Control  Immunotherapy:    Immunosuppressants       None          Steroids:   Hormones (From admission, onward)      None          Pressors:    Autonomic Drugs (From admission, onward)      None          Hyperglycemia/Diabetes Medications:   Antihyperglycemics (From admission, onward)      Start     Stop Route Frequency Ordered    01/02/24 1338  insulin aspart U-100 pen 0-5 Units         -- SubQ Every 4 hours PRN 01/02/24 1238             PHYSICAL EXAMINATION:  Vitals:    01/02/24 1430   BP: (!) 146/69   Pulse: 68   Resp: 15   Temp:      Body mass index is 21.97 kg/m².     Physical Exam  Constitutional:       General: She is not in acute distress.     Appearance: She is not ill-appearing.   Eyes:      Conjunctiva/sclera: Conjunctivae normal.   Pulmonary:      Effort: No respiratory distress.   Psychiatric:         Mood and Affect: Mood normal.         Behavior: Behavior normal.            Labs Reviewed and Include   Recent Labs   Lab 01/02/24  0626 01/02/24  1242    183*   CALCIUM 10.5 9.8   ALBUMIN 4.0 3.7   PROT 8.7*  --     139   K 5.7* 4.5   CO2 30* 25   CL 92* 100   BUN 31* 32*   CREATININE 4.6* 4.3*   ALKPHOS 108  --    ALT 29  --    AST 29  --    BILITOT 0.8  --      Lab Results   Component Value Date    WBC 12.68 01/02/2024    HGB 11.8 (L) 01/02/2024    HCT 36.1 (L) 01/02/2024    MCV 92 01/02/2024     (L) 01/02/2024     No results for input(s): "TSH", "FREET4" in the last 168 hours.  Lab Results   Component Value Date    HGBA1C 4.9 11/15/2023       Nutritional status:   Body mass index is 21.97 kg/m².  Lab Results   Component Value Date    ALBUMIN 3.7 01/02/2024    ALBUMIN 4.0 01/02/2024    ALBUMIN 4.3 11/15/2023     No results found for: "PREALBUMIN"    Estimated Creatinine Clearance: 10.5 mL/min (A) (based on SCr " of 4.3 mg/dL (H)).    Accu-Checks  Recent Labs     01/02/24  0628 01/02/24  1235   POCTGLUCOSE 104 174*        ASSESSMENT and PLAN    Renal/  ESRD (end stage renal disease)    Titrate insulin slowly to avoid hypoglycemia as the risk of hypoglycemia increases with decreased creatinine clearance.      Oncology  Malignant carcinoid tumor of bronchus  Managed per primary team        Endocrine  Type 2 diabetes mellitus with diabetic polyneuropathy, without long-term current use of insulin  BG goal: 140-180    - Start Novolog Low dose correction with ISF 50 starting at 150 prn ac/hs   - POCT Glucose before meals and at bedtime  - Hypoglycemia protocol in place      ** Please notify Endocrine for any change and/or advance in diet**  ** Please call Endocrine for any BG related issues **     Discharge Planning:   TBD. Please notify endocrinology prior to discharge.            Hue Lu PA-C  Endocrinology  [facility] - Surgery (2nd Fl)

## 2024-01-02 NOTE — H&P
[facility] - [facility] ([facility])  General Surgery  History & Physical    Patient Name: Lily Green  MRN: [id number]  Admission Date: 1/2/2024  Attending Physician: Tan Thomson MD   Primary Care Provider: Pavel Calixto MD    Patient information was obtained from patient, past medical records, and ER records.     Subjective:       History of Present Illness:  Patient is a 73 y.o. 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  Chest CT 10/18/23 revealed 1.2 x 1.1 cm right middle lobe nodule, previously 1.2 x 1.0 cm in July 2023. Underwent Robotic Bronchoscopy 10/20/23; path: RML nodule positive for well-differentiated neuroendocrine neoplasm, carcinoid tumor. PET/CT Cu64 11/17/23 showed known RML nodule with hypermetabolic activity.        PSH: breast biopsy with tagging, appendectomy, colostomy closure (01/16/23), BEAU, D&C of uterus, Ex Lap x2 (01/14/23 & 01/16/23), R rotator cuff repair x2 (2009/2010), tonsillectomy, UGI endoscopy (2018)  Meds: torsemide, pregabalin, dronabinol, sevelamer       No current facility-administered medications on file prior to encounter.     Current Outpatient Medications on File Prior to Encounter   Medication Sig    atorvastatin (LIPITOR) 40 MG tablet Take 1 tablet (40 mg total) by mouth once daily. (Patient taking differently: Take 40 mg by mouth every evening.)    B complex-vitamin C-folic acid (MELLISSA-BENJY) 0.8 mg Tab Take 1 tablet (0.8 mg total) by mouth once daily.    betahistine HCl (BETAHISTINE, BULK, MISC)     blood sugar diagnostic (TRUE METRIX GLUCOSE TEST STRIP) Strp USE ONE STRIP FOR TESTING 2 TIMES A DAY    blood-glucose meter kit Use to test twice a day    calcium-vitamin D3 (OS-CIPRIANO 500 + D3) 500 mg-5 mcg (200 unit) per tablet Take 1 tablet by mouth once daily.    coenzyme Q10 100 mg capsule     dicyclomine (BENTYL) 10 MG capsule     dronabinoL (MARINOL)  "5 MG capsule Take 1 capsule (5 mg total) by mouth 2 (two) times daily before meals.    econazole nitrate 1 % cream Apply topically 2 (two) times daily.    epoetin noble-epbx (RETACRIT) 10,000 unit/mL imjection Inject 1 mL (10,000 Units total) into the skin every Mon, Wed, Fri. HOLD IF HEMOGLOBIN is 10 or GREATER    DO NOT SHAKE  DO NOT DILUTE  DO NOT MIX with other drug solutions    ferrous gluconate (FERGON) 324 MG tablet Take 1 tablet (324 mg total) by mouth daily with breakfast.    insulin aspart U-100 (NOVOLOG) 100 unit/mL (3 mL) InPn pen Inject before meals:  150-200=+1, 201-250=+2, 251-300=+3; 301-350=+4, over 350=+5 units    lancets Misc 1 lancet by Misc.(Non-Drug; Combo Route) route 4 (four) times daily.    meclizine (ANTIVERT) 25 mg tablet Take 1 tablet (25 mg total) by mouth 3 (three) times daily as needed for Dizziness.    melatonin (MELATIN) 3 mg tablet Take 3 tablets (9 mg total) by mouth nightly as needed for Insomnia.    mometasone (ELOCON) 0.1 % ointment Apply topically.    neomycin (MYCIFRADIN) 500 mg Tab     nutritional supplements Liqd Take 237 mLs by mouth 4 (four) times daily.    oxyCODONE (ROXICODONE) 5 MG immediate release tablet Take 5 mg by mouth every 4 (four) hours as needed.    pantoprazole (PROTONIX) 40 MG tablet Take 1 tablet (40 mg total) by mouth once daily.    patiromer calcium sorbitex (VELTASSA) 8.4 gram PwPk Take 2 packets (16.8 g total) by mouth once daily.    pen needle, diabetic (BD ULTRA-FINE MARIS PEN NEEDLE) 32 gauge x 5/32" Ndle 1 Device by Misc.(Non-Drug; Combo Route) route 4 (four) times daily with meals and nightly.    pregabalin (LYRICA) 150 MG capsule TAKE ONE CAPSULE BY MOUTH 2 TIMES A DAY    pregabalin (LYRICA) 75 MG capsule Take 1 capsule (75 mg total) by mouth Daily. Give after HD    sevelamer HCL (RENAGEL) 800 MG Tab Take 2 tablets (1,600 mg total) by mouth 3 (three) times daily with meals.    simethicone (MYLICON) 125 mg Cap capsule Take by mouth.    simethicone " (MYLICON) 80 MG chewable tablet Take 1 tablet (80 mg total) by mouth 4 (four) times daily with meals and nightly.    vit C,V-Vl-knble-lutein-zeaxan (PRESERVISION AREDS 2) 398-395-12-1 mg-unit-mg-mg Cap     vitamins  A,C,E-zinc-copper 14,320-226-200 unit-mg-unit Cap Take 1 tablet by mouth once daily.       Review of patient's allergies indicates:   Allergen Reactions    Crestor [rosuvastatin] Swelling    Gluten protein        Past Medical History:   Diagnosis Date    Anxiety     Celiac disease 2018    Celiac disease     Depression     Diabetes mellitus     Family history of breast cancer in mother      at age 68    Hyperlipidemia     Hypertension     Meniere disease     Meniere's disease, unspecified ear     Menopause     Peptic ulcer     Reflux esophagitis     Urinary tract infection     Vaginal infection     Vaginal Pap smear 2014    Pap/Hpv Negative      Past Surgical History:   Procedure Laterality Date    APPENDECTOMY      BREAST SURGERY      Tags    CARPAL TUNNEL RELEASE Bilateral 2017    ,     CLOSURE, COLOSTOMY Left 2023    Procedure: CLOSURE, COLOSTOMY;  Surgeon: Manuel Javier MD;  Location: [facility] OR;  Service: General;  Laterality: Left;    COLONOSCOPY  2018    Normal  (Cornell Velazco)     COLOSTOMY Right 2023    Procedure: CREATION, COLOSTOMY;  Surgeon: Manuel Javier MD;  Location: [facility] OR;  Service: General;  Laterality: Right;    DILATION AND CURETTAGE OF UTERUS  1986    DUPUYTREN CONTRACTURE RELEASE Bilateral 2017    HYSTERECTOMY  1987    BEAU w/ appy, no BSO     INJECTION OF NEUROLYTIC AGENT AROUND LUMBAR SYMPATHETIC NERVE N/A 2022    Procedure: BLOCK, LUMBAR SYMPATHETIC;  Surgeon: Souleymane Rizo Jr., MD;  Location: [facility] PAIN MGT;  Service: Pain Management;  Laterality: N/A;  no pacemaker   pt is diabetic     INJECTION OF NEUROLYTIC AGENT AROUND LUMBAR SYMPATHETIC NERVE N/A 2022    Procedure: BLOCK, LUMBAR SYMPATHETIC;  Surgeon: Souleymane Rizo  MD Abby;  Location: [facility] PAIN MGT;  Service: Pain Management;  Laterality: N/A;  diabetic     INSERTION OF TUNNELED CENTRAL VENOUS HEMODIALYSIS CATHETER Right 1/25/2023    Procedure: INSERTION, CATHETER, HEMODIALYSIS, DUAL LUMEN;  Surgeon: Manuel Javier MD;  Location: [facility] OR;  Service: General;  Laterality: Right;    INSERTION, CATHETER, TUNNELED Left 1/28/2023    Procedure: Insertion,catheter,tunneled;  Surgeon: Watson Fontenot MD;  Location: [facility] OR;  Service: General;  Laterality: Left;    LAPAROTOMY, EXPLORATORY N/A 1/14/2023    Procedure: LAPAROTOMY, EXPLORATORY;  Surgeon: Manuel Javier MD;  Location: [facility] OR;  Service: General;  Laterality: N/A;    LAPAROTOMY, EXPLORATORY N/A 1/16/2023    Procedure: LAPAROTOMY, EXPLORATORY;  Surgeon: Manuel Javier MD;  Location: [facility] OR;  Service: General;  Laterality: N/A;    REMOVAL OF CATHETER Right 1/28/2023    Procedure: REMOVAL, CATHETER;  Surgeon: Watson Fontenot MD;  Location: [facility] OR;  Service: General;  Laterality: Right;    REMOVAL, TUNNELED CATH Right 1/25/2023    Procedure: REMOVAL, TUNNELED CATH;  Surgeon: Manuel Javier MD;  Location: [facility] OR;  Service: General;  Laterality: Right;    ROBOTIC BRONCHOSCOPY N/A 10/20/2023    Procedure: ROBOTIC BRONCHOSCOPY;  Surgeon: Mayda Monzon MD;  Location: [address];  Service: Pulmonary;  Laterality: N/A;    SHOULDER SURGERY  2009 & 2010    right rotator cuff    TONSILLECTOMY      UPPER GASTROINTESTINAL ENDOSCOPY  04/2018     Family History       Problem Relation (Age of Onset)    Arthritis Father    Breast cancer Mother, Paternal Aunt    Cancer Mother, Paternal Aunt    Diabetes Paternal Grandfather    Hyperlipidemia Sister    Vision loss Father, Mother, Sister          Tobacco Use    Smoking status: Never    Smokeless tobacco: Never   Substance and Sexual Activity    Alcohol use: No    Drug use: Never    Sexual activity: Not Currently     Partners: Male     Birth control/protection: See Surgical  Hx     Comment: :      Review of Systems  Objective:     Vital Signs (Most Recent):    Vital Signs (24h Range):           There is no height or weight on file to calculate BMI.    Physical Exam  Vitals and nursing note reviewed.   Constitutional:       Appearance: Normal appearance.   HENT:      Head: Normocephalic and atraumatic.   Cardiovascular:      Rate and Rhythm: Normal rate and regular rhythm.   Pulmonary:      Effort: Pulmonary effort is normal. No respiratory distress.      Comments: Right chest port  Abdominal:      General: Abdomen is flat. There is no distension.      Palpations: Abdomen is soft. There is no mass.      Tenderness: There is no abdominal tenderness.      Hernia: No hernia is present.   Musculoskeletal:      Right lower leg: No edema.      Left lower leg: No edema.   Skin:     General: Skin is warm and dry.      Comments: Left arm fistula    Neurological:      General: No focal deficit present.      Mental Status: She is alert and oriented to person, place, and time.   Psychiatric:         Mood and Affect: Mood normal.         Behavior: Behavior normal.         Significant Labs:  I have reviewed all pertinent lab results within the past 24 hours.    Significant Diagnostics:  I have reviewed all pertinent imaging results/findings within the past 24 hours.    Assessment/Plan:     Patient is a 73 y.o. female never smoker with DDD, benign essential tremor, meniere's disease, HTN, HLD, DM2, GERD, Celiac's, IBS who is referred to clinic by Dr. Monzon, Pulmonology, for evaluation of RML Carcinoid tumor.  Although her recent surgical history has been complicated she seems clinically well at this point. Her functional status does not seem to correspond to her previous ECHO which showed  EF of 35%     Has undergone stress test - normal   Seen by rad onc, PCP  MDT discussion - recommend Proceed to OR for RMLectomy with MLND.     Risks and benefits discussed, will proceed with above procedure.  Consent obtained.    Nephrology consult for HD inpatient during stay.       VTE Risk Mitigation (From admission, onward)           Ordered     IP VTE HIGH RISK PATIENT  Once         01/02/24 0536     Place CARMELITA hose  Until discontinued         01/02/24 0536     Place sequential compression device  Until discontinued         01/02/24 0536                    Kat Flores MD  General Surgery  [facility] - Surgery ([facility])

## 2024-01-02 NOTE — ASSESSMENT & PLAN NOTE
BG goal: 140-180    - Start Novolog Low dose correction with ISF 50 starting at 150 prn ac/hs   - POCT Glucose before meals and at bedtime  - Hypoglycemia protocol in place      ** Please notify Endocrine for any change and/or advance in diet**  ** Please call Endocrine for any BG related issues **     Discharge Planning:   TBD. Please notify endocrinology prior to discharge.

## 2024-01-02 NOTE — Clinical Note
The site was marked. The left groin was prepped. The site was prepped with ChloraPrep. The site was clipped. The patient was draped.

## 2024-01-02 NOTE — ANESTHESIA PROCEDURE NOTES
Arterial    Diagnosis: Lung cancer    Patient location during procedure: done in OR    Staffing  Authorizing Provider: Manuel Luna MD  Performing Provider: Marcelo Villegas MD    Staffing  Performed by: Marcelo Villegas MD  Authorized by: Manuel Luna MD    Anesthesiologist was present at the time of the procedure.    Preanesthetic Checklist  Completed: patient identified, IV checked, site marked, risks and benefits discussed, surgical consent, monitors and equipment checked, pre-op evaluation, timeout performed and anesthesia consent givenArterial  Skin Prep: chlorhexidine gluconate  Local Infiltration: none    Catheter Size: 20 G  Catheter placement by Ultrasound guidance. Heme positive aspiration all ports.   Vessel Caliber: medium, patent  Needle advanced into vessel with real time Ultrasound guidance.Insertion Attempts: 1  Assessment  Dressing: secured with tape and tegaderm  Patient: Tolerated well

## 2024-01-03 LAB
ALBUMIN SERPL BCP-MCNC: 3.5 G/DL (ref 3.5–5.2)
ANION GAP SERPL CALC-SCNC: 17 MMOL/L (ref 8–16)
BASOPHILS # BLD AUTO: 0.02 K/UL (ref 0–0.2)
BASOPHILS NFR BLD: 0.2 % (ref 0–1.9)
BUN SERPL-MCNC: 48 MG/DL (ref 8–23)
CALCIUM SERPL-MCNC: 9.9 MG/DL (ref 8.7–10.5)
CHLORIDE SERPL-SCNC: 95 MMOL/L (ref 95–110)
CO2 SERPL-SCNC: 23 MMOL/L (ref 23–29)
CREAT SERPL-MCNC: 5.8 MG/DL (ref 0.5–1.4)
DIFFERENTIAL METHOD BLD: ABNORMAL
EOSINOPHIL # BLD AUTO: 0 K/UL (ref 0–0.5)
EOSINOPHIL NFR BLD: 0.1 % (ref 0–8)
ERYTHROCYTE [DISTWIDTH] IN BLOOD BY AUTOMATED COUNT: 16.4 % (ref 11.5–14.5)
EST. GFR  (NO RACE VARIABLE): 7.2 ML/MIN/1.73 M^2
GLUCOSE SERPL-MCNC: 132 MG/DL (ref 70–110)
HBV SURFACE AG SERPL QL IA: NORMAL
HCT VFR BLD AUTO: 35.2 % (ref 37–48.5)
HGB BLD-MCNC: 11.5 G/DL (ref 12–16)
IMM GRANULOCYTES # BLD AUTO: 0.02 K/UL (ref 0–0.04)
IMM GRANULOCYTES NFR BLD AUTO: 0.2 % (ref 0–0.5)
LYMPHOCYTES # BLD AUTO: 1.4 K/UL (ref 1–4.8)
LYMPHOCYTES NFR BLD: 15.8 % (ref 18–48)
MCH RBC QN AUTO: 30.1 PG (ref 27–31)
MCHC RBC AUTO-ENTMCNC: 32.7 G/DL (ref 32–36)
MCV RBC AUTO: 92 FL (ref 82–98)
MONOCYTES # BLD AUTO: 1.9 K/UL (ref 0.3–1)
MONOCYTES NFR BLD: 20.5 % (ref 4–15)
NEUTROPHILS # BLD AUTO: 5.7 K/UL (ref 1.8–7.7)
NEUTROPHILS NFR BLD: 63.2 % (ref 38–73)
NRBC BLD-RTO: 0 /100 WBC
PHOSPHATE SERPL-MCNC: 6.8 MG/DL (ref 2.7–4.5)
PLATELET # BLD AUTO: 161 K/UL (ref 150–450)
PMV BLD AUTO: 11.4 FL (ref 9.2–12.9)
POCT GLUCOSE: 121 MG/DL (ref 70–110)
POCT GLUCOSE: 121 MG/DL (ref 70–110)
POCT GLUCOSE: 138 MG/DL (ref 70–110)
POCT GLUCOSE: 158 MG/DL (ref 70–110)
POCT GLUCOSE: 233 MG/DL (ref 70–110)
POTASSIUM SERPL-SCNC: 5.6 MMOL/L (ref 3.5–5.1)
RBC # BLD AUTO: 3.82 M/UL (ref 4–5.4)
SODIUM SERPL-SCNC: 135 MMOL/L (ref 136–145)
WBC # BLD AUTO: 9.06 K/UL (ref 3.9–12.7)

## 2024-01-03 PROCEDURE — 25000003 PHARM REV CODE 250: Performed by: SURGERY

## 2024-01-03 PROCEDURE — 20600001 HC STEP DOWN PRIVATE ROOM

## 2024-01-03 PROCEDURE — 97535 SELF CARE MNGMENT TRAINING: CPT

## 2024-01-03 PROCEDURE — 63600175 PHARM REV CODE 636 W HCPCS: Performed by: NURSE PRACTITIONER

## 2024-01-03 PROCEDURE — 63600175 PHARM REV CODE 636 W HCPCS

## 2024-01-03 PROCEDURE — 87340 HEPATITIS B SURFACE AG IA: CPT | Performed by: STUDENT IN AN ORGANIZED HEALTH CARE EDUCATION/TRAINING PROGRAM

## 2024-01-03 PROCEDURE — 90935 HEMODIALYSIS ONE EVALUATION: CPT | Mod: ,,, | Performed by: NURSE PRACTITIONER

## 2024-01-03 PROCEDURE — 25000003 PHARM REV CODE 250: Performed by: NURSE PRACTITIONER

## 2024-01-03 PROCEDURE — 85025 COMPLETE CBC W/AUTO DIFF WBC: CPT | Performed by: PHYSICIAN ASSISTANT

## 2024-01-03 PROCEDURE — 11000001 HC ACUTE MED/SURG PRIVATE ROOM

## 2024-01-03 PROCEDURE — 36415 COLL VENOUS BLD VENIPUNCTURE: CPT | Performed by: STUDENT IN AN ORGANIZED HEALTH CARE EDUCATION/TRAINING PROGRAM

## 2024-01-03 PROCEDURE — 5A1D70Z PERFORMANCE OF URINARY FILTRATION, INTERMITTENT, LESS THAN 6 HOURS PER DAY: ICD-10-PCS | Performed by: INTERNAL MEDICINE

## 2024-01-03 PROCEDURE — 97165 OT EVAL LOW COMPLEX 30 MIN: CPT

## 2024-01-03 PROCEDURE — 25000003 PHARM REV CODE 250: Performed by: PHYSICIAN ASSISTANT

## 2024-01-03 PROCEDURE — 63600175 PHARM REV CODE 636 W HCPCS: Performed by: PHYSICIAN ASSISTANT

## 2024-01-03 PROCEDURE — 97161 PT EVAL LOW COMPLEX 20 MIN: CPT

## 2024-01-03 PROCEDURE — 90935 HEMODIALYSIS ONE EVALUATION: CPT

## 2024-01-03 PROCEDURE — 80069 RENAL FUNCTION PANEL: CPT | Performed by: PHYSICIAN ASSISTANT

## 2024-01-03 RX ORDER — TORSEMIDE 20 MG/1
40 TABLET ORAL DAILY
Status: DISCONTINUED | OUTPATIENT
Start: 2024-01-03 | End: 2024-01-08

## 2024-01-03 RX ORDER — MIDODRINE HYDROCHLORIDE 5 MG/1
10 TABLET ORAL
Status: COMPLETED | OUTPATIENT
Start: 2024-01-03 | End: 2024-01-03

## 2024-01-03 RX ORDER — LIDOCAINE 50 MG/G
1 PATCH TOPICAL
Status: DISCONTINUED | OUTPATIENT
Start: 2024-01-03 | End: 2024-02-06 | Stop reason: HOSPADM

## 2024-01-03 RX ORDER — SIMETHICONE 80 MG
1 TABLET,CHEWABLE ORAL 3 TIMES DAILY PRN
Status: DISCONTINUED | OUTPATIENT
Start: 2024-01-03 | End: 2024-01-08

## 2024-01-03 RX ORDER — METHOCARBAMOL 500 MG/1
500 TABLET, FILM COATED ORAL 2 TIMES DAILY
Status: DISCONTINUED | OUTPATIENT
Start: 2024-01-03 | End: 2024-01-08

## 2024-01-03 RX ORDER — ACETAMINOPHEN 325 MG/1
650 TABLET ORAL EVERY 8 HOURS
Status: DISCONTINUED | OUTPATIENT
Start: 2024-01-03 | End: 2024-01-08

## 2024-01-03 RX ORDER — HEPARIN SODIUM 1000 [USP'U]/ML
1000 INJECTION, SOLUTION INTRAVENOUS; SUBCUTANEOUS
Status: DISPENSED | OUTPATIENT
Start: 2024-01-03 | End: 2024-01-04

## 2024-01-03 RX ADMIN — SEVELAMER CARBONATE 1600 MG: 800 TABLET, FILM COATED ORAL at 06:01

## 2024-01-03 RX ADMIN — INSULIN ASPART 2 UNITS: 100 INJECTION, SOLUTION INTRAVENOUS; SUBCUTANEOUS at 04:01

## 2024-01-03 RX ADMIN — MIDODRINE HYDROCHLORIDE 10 MG: 5 TABLET ORAL at 09:01

## 2024-01-03 RX ADMIN — ATORVASTATIN CALCIUM 40 MG: 40 TABLET, FILM COATED ORAL at 09:01

## 2024-01-03 RX ADMIN — HEPARIN SODIUM 1000 UNITS: 1000 INJECTION, SOLUTION INTRAVENOUS; SUBCUTANEOUS at 12:01

## 2024-01-03 RX ADMIN — METHOCARBAMOL 500 MG: 500 TABLET ORAL at 01:01

## 2024-01-03 RX ADMIN — SENNOSIDES AND DOCUSATE SODIUM 1 TABLET: 8.6; 5 TABLET ORAL at 09:01

## 2024-01-03 RX ADMIN — SODIUM CHLORIDE: 9 INJECTION, SOLUTION INTRAVENOUS at 08:01

## 2024-01-03 RX ADMIN — LIDOCAINE 5% 1 PATCH: 700 PATCH TOPICAL at 01:01

## 2024-01-03 RX ADMIN — TORSEMIDE 40 MG: 20 TABLET ORAL at 01:01

## 2024-01-03 RX ADMIN — SEVELAMER CARBONATE 1600 MG: 800 TABLET, FILM COATED ORAL at 01:01

## 2024-01-03 RX ADMIN — OXYCODONE HYDROCHLORIDE 10 MG: 10 TABLET ORAL at 05:01

## 2024-01-03 RX ADMIN — ACETAMINOPHEN 650 MG: 325 TABLET ORAL at 01:01

## 2024-01-03 RX ADMIN — HEPARIN SODIUM 5000 UNITS: 5000 INJECTION INTRAVENOUS; SUBCUTANEOUS at 01:01

## 2024-01-03 RX ADMIN — PREGABALIN 75 MG: 75 CAPSULE ORAL at 05:01

## 2024-01-03 RX ADMIN — PANTOPRAZOLE SODIUM 40 MG: 40 TABLET, DELAYED RELEASE ORAL at 01:01

## 2024-01-03 RX ADMIN — ACETAMINOPHEN 650 MG: 325 TABLET ORAL at 09:01

## 2024-01-03 RX ADMIN — SENNOSIDES AND DOCUSATE SODIUM 1 TABLET: 8.6; 5 TABLET ORAL at 01:01

## 2024-01-03 RX ADMIN — HEPARIN SODIUM 5000 UNITS: 5000 INJECTION INTRAVENOUS; SUBCUTANEOUS at 09:01

## 2024-01-03 RX ADMIN — METHOCARBAMOL 500 MG: 500 TABLET ORAL at 09:01

## 2024-01-03 RX ADMIN — OXYCODONE HYDROCHLORIDE 5 MG: 5 TABLET ORAL at 08:01

## 2024-01-03 RX ADMIN — OXYCODONE HYDROCHLORIDE 5 MG: 5 TABLET ORAL at 03:01

## 2024-01-03 RX ADMIN — POLYETHYLENE GLYCOL 3350 17 G: 17 POWDER, FOR SOLUTION ORAL at 01:01

## 2024-01-03 RX ADMIN — OXYCODONE HYDROCHLORIDE 10 MG: 10 TABLET ORAL at 01:01

## 2024-01-03 RX ADMIN — HEPARIN SODIUM 5000 UNITS: 5000 INJECTION INTRAVENOUS; SUBCUTANEOUS at 06:01

## 2024-01-03 RX ADMIN — SEVELAMER CARBONATE 1600 MG: 800 TABLET, FILM COATED ORAL at 05:01

## 2024-01-03 NOTE — PLAN OF CARE
Problem: Occupational Therapy  Goal: Occupational Therapy Goal  Description: Goals to be met by: 02/2/24     Patient will increase functional independence with ADLs by performing:    UE Dressing with Supervision.  Grooming while standing at sink with Modified [place].  Toileting from toilet with Modified [place] for hygiene and clothing management.   Supine to sit with Supervision.  Toilet transfer to toilet with Supervision.    Outcome: Ongoing, Progressing

## 2024-01-03 NOTE — PLAN OF CARE
[facility]  Initial Discharge Assessment       Primary Care Provider: Pavel Calixto MD    Admission Diagnosis: Malignant carcinoid tumor of bronchus [C7A.090]  Carcinoid tumor of lung [D3A.090]    Admission Date: 1/2/2024  Expected Discharge Date:     Transition of Care Barriers: None    Payor: HU[employer] MANAGED MEDICARE / Plan: HU[employer] MEDICARE PPO / Product Type: Medicare Advantage /     Extended Emergency Contact Information  Primary Emergency Contact: Sweetie Saha   [facility]  Home Phone: [phone]  Mobile Phone: [phone]  Relation: Sister  Secondary Emergency Contact: Deric Green   [place]  Mobile Phone: [phone]  Relation: Spouse    Discharge Plan A: Other (Dialysis)  Discharge Plan B: Home with family      [id number] - [place], [place] - [address]  [address]  [place] [address]  Phone: [phone] Fax: [phone]    [id number] - [place], [place] - [address] AT [facility] [address]  [address]  [place] [address]  Phone: [phone] Fax: [phone]      Initial Assessment (most recent)       Adult Discharge Assessment - 01/03/24 1418          Discharge Assessment    Assessment Type Discharge Planning Assessment     Confirmed/corrected address, phone number and insurance Yes     Confirmed Demographics Correct on Facesheet     Source of Information patient     Communicated CAMPBELL with patient/caregiver Date not available/Unable to determine     Reason For Admission Malignant carcinoid tumor of bronchus     People in Home alone     Do you expect to return to your current living situation? Yes     Do you have help at home or someone to help you manage your care at home? Yes     Who are your caregiver(s) and their phone number(s)? Private Sitter: MWF 7am-2pm     Prior to hospitilization cognitive status: Alert/Oriented     Current cognitive status: Alert/Oriented     Walking or Climbing Stairs Difficulty no      Dressing/Bathing Difficulty no     Equipment Currently Used at Home shower chair;walker, rolling;cane, straight;bedside commode;grab bar     Patient currently being followed by outpatient case management? No     Do you currently have service(s) that help you manage your care at home? Yes     How Many hours does patient receive services 21     Name and Contact number of agency Private Sitter     Is the pt/caregiver preference to resume services with current agency Yes     Do you take prescription medications? Yes     Do you have prescription coverage? Yes     Coverage [employer]     Do you have any problems affording any of your prescribed medications? No     Is the patient taking medications as prescribed? yes     Who is going to help you get home at discharge? Spouse: Deric Green [phone]     How do you get to doctors appointments? family or friend will provide     Are you on dialysis? Yes     Dialysis Name and Scheduled days [facility] ,, Sat 3.5 hours 11:30am     Do you take coumadin? No     Discharge Plan A Other   Dialysis    Discharge Plan B Home with family     DME Needed Upon Discharge  other (see comments)   TBD    Discharge Plan discussed with: Patient     Transition of Care Barriers None                   SW met with the patient at the bedside and discussed the discharge plan and placed contact numbers on the white board in the room. Patient alert and sitting on the side of the bed.  Patient verified her name , , PCP, Insurance and Pharmacy . Stated she lives alone in a single story home and has 1 steps to point of entry . Prior to admission patient was independent with all ADLS and wasn't receiving any HH services. Patient reported that she has a private sitter that assist her on MWF from 7am-2pm. She is not on coumadin. Patient receives Dialysis treatment from [facility] on MWF: Chair time 11:30 am. DME's include:shower chair, BSC, cane, RW, and grab bars. She takes  medication as  prescribed and has no problems getting  medication.       Briana Sweeney MSW  Department of Case Management   [facility]  [address] [place] [address]  Phone : [phone]      Discharge Plan A and Plan B have been determined by review of patient's clinical status, future medical and therapeutic needs, and coverage/benefits for post-acute care in coordination with multidisciplinary team members.

## 2024-01-03 NOTE — SUBJECTIVE & OBJECTIVE
Interval History: CATHY  Sat in chair overnight  Pain controlled  Small air leak  Going to HD today     Medications:  Continuous Infusions:  Scheduled Meds:   sodium chloride 0.9%   Intravenous Once    acetaminophen  650 mg Oral Q8H    atorvastatin  40 mg Oral QHS    heparin (porcine)  5,000 Units Subcutaneous Q8H    LIDOcaine  1 patch Transdermal Q24H    methocarbamoL  500 mg Oral BID    pantoprazole  40 mg Oral Daily    polyethylene glycol  17 g Oral Daily    pregabalin  75 mg Oral Daily    senna-docusate 8.6-50 mg  1 tablet Oral BID    sevelamer carbonate  1,600 mg Oral TID WM     PRN Meds:acetaminophen, bisacodyL, dextrose 10%, dextrose 10%, glucagon (human recombinant), glucose, glucose, insulin aspart U-100, ondansetron, oxyCODONE, oxyCODONE     Review of patient's allergies indicates:   Allergen Reactions    Crestor [rosuvastatin] Swelling    Gluten protein      Objective:     Vital Signs (Most Recent):  Temp: 98.3 °F (36.8 °C) (01/03/24 0804)  Pulse: 83 (01/03/24 0804)  Resp: 18 (01/03/24 0823)  BP: (!) 127/59 (01/03/24 0804)  SpO2: 95 % (01/03/24 0804) Vital Signs (24h Range):  Temp:  [97.7 °F (36.5 °C)-98.8 °F (37.1 °C)] 98.3 °F (36.8 °C)  Pulse:  [66-88] 83  Resp:  [13-20] 18  SpO2:  [92 %-100 %] 95 %  BP: (115-159)/(55-72) 127/59     Intake/Output - Last 3 Shifts         01/01 0700  01/02 0659 01/02 0700  01/03 0659 01/03 0700  01/04 0659    P.O.  400     I.V. (mL/kg)  500 (7.6)     Blood  1090     IV Piggyback  2000     Total Intake(mL/kg)  3990 (60.8)     Urine (mL/kg/hr)  0 (0)     Chest Tube  500     Total Output  500     Net  +3490            Stool Occurrence   0 x            SpO2: 95 %        Physical Exam  Vitals and nursing note reviewed.   Constitutional:       Appearance: Normal appearance.   HENT:      Head: Normocephalic and atraumatic.   Cardiovascular:      Rate and Rhythm: Normal rate and regular rhythm.   Pulmonary:      Effort: Pulmonary effort is normal. No respiratory distress.       Comments: Right posterior chest incision c/d/I  Chest port sites c/d/I  Chest tube in place with serosanguinous output, small air leak with cough   Abdominal:      General: Abdomen is flat. There is no distension.      Palpations: Abdomen is soft. There is no mass.      Tenderness: There is no abdominal tenderness.      Hernia: No hernia is present.   Musculoskeletal:      Right lower leg: No edema.      Left lower leg: No edema.   Skin:     General: Skin is warm and dry.   Neurological:      General: No focal deficit present.      Mental Status: She is alert and oriented to person, place, and time.   Psychiatric:         Mood and Affect: Mood normal.         Behavior: Behavior normal.            Significant Labs:  BMP:   Recent Labs   Lab 01/03/24  0605   *   *   K 5.6*   CL 95   CO2 23   BUN 48*   CREATININE 5.8*   CALCIUM 9.9     CBC:   Recent Labs   Lab 01/03/24  0605   WBC 9.06   RBC 3.82*   HGB 11.5*   HCT 35.2*      MCV 92   MCH 30.1   MCHC 32.7       Significant Diagnostics:  CT: I have reviewed all pertinent results/findings within the past 24 hours    VTE Risk Mitigation (From admission, onward)           Ordered     heparin (porcine) injection 5,000 Units  Every 8 hours         01/02/24 1226     IP VTE HIGH RISK PATIENT  Once         01/02/24 1226     Place CARMELITA hose  Until discontinued         01/02/24 1226     Place sequential compression device  Until discontinued         01/02/24 1226

## 2024-01-03 NOTE — PROGRESS NOTES
Hemodialysis treatment completed.    Treatment time received: 3 hours    Net fluid removed: 2.5 liters    Medications received: midodrine per MAR    No issues during HD treatment.     Blood returned, heparin locked ports per order, capped, and taped.    Report given to Melvin BELL  Refer to dialysis flowsheet and MAR for details.  Patient transported back in wheelchair from Dialysis to primary unit.

## 2024-01-03 NOTE — PT/OT/SLP EVAL
"Physical Therapy Evaluation    Patient Name:  Lily Green   MRN:  [id number]    Recommendations:     Discharge Recommendations: Low Intensity Therapy   Discharge Equipment Recommendations: none   Barriers to discharge: Decreased caregiver support    Assessment:     Lily Green is a 73 y.o. female admitted with a medical diagnosis of Malignant carcinoid tumor of bronchus.  She presents with the following impairments/functional limitations: weakness, gait instability, impaired balance, impaired functional mobility, impaired cardiopulmonary response to activity . Pt is unsafe with functional mobility at this time due to pt requires minimal assist for bed mobility, minimal assist for transfers, and minimal assist for gait due to instability and pain. Pt is motivated to progress with functional mobility.     Rehab Prognosis: Good; patient would benefit from acute skilled PT services to address these deficits and reach maximum level of function.    Recent Surgery: Procedure(s) (LRB):  LYMPHADENECTOMY (Right)  THORACOTOMY (Right)  THORACOTOMY, WITH INITIAL THERAPEUTIC WEDGE RESECTION OF LUNG (Right) 1 Day Post-Op    Plan:     During this hospitalization, patient to be seen 4 x/week to address the identified rehab impairments via gait training, therapeutic activities, therapeutic exercises, neuromuscular re-education and progress toward the following goals:    Plan of Care Expires:  02/02/24    Subjective   "I sat up for a few hours this morning"    Pain/Comfort:  Pain Rating 1: 4/10  Location - Side 1: Right  Location - Orientation 1: lateral  Location 1: chest  Pain Addressed 1: Reposition, Cessation of Activity, Nurse notified  Pain Rating Post-Intervention 1: 4/10    Patients cultural, spiritual, Scientologist conflicts given the current situation: no    Living Environment:  Pt lives alone in a 1 story home with 1 [facility]. Pt has a caregiver for 6 hours/day for 3 days/week  Prior to admission, patients " level of function was independent for gait and required set up assist for ADLs and cooking.  Equipment used at home: shower chair, grab bar.  Upon discharge, patient will have assistance from caregiver at times.    Objective:     Communicated with nurse prior to session.  Patient found HOB elevated with chest tube  upon PT entry to room.    General Precautions: Standard, fall  Orthopedic Precautions:N/A   Braces: N/A  Respiratory Status: Room air    Exams:  Cognitive Exam:  Patient is oriented to Person, Place, and Time  Sensation:    -       Intact  light/touch BLE  RLE ROM: WFL  RLE Strength: WFL except hip flex 3-/5  LLE ROM: WFL  LLE Strength: WFL except hip flex 3-/5    Functional Mobility:  Bed Mobility:     Rolling Right: minimum assistance  Supine to Sit: minimum assistance  Sit to Supine: minimum assistance  Transfers:     Sit to Stand:  minimum assistance with hand-held assist  Gait: 150ft with HHA with minimal assist. Pt with posterior and lateral instability during gait.    AM-PAC 6 CLICK MOBILITY  Total Score:17     Treatment & Education:  Pt sat on the EOB ~ 3 min with SBA prior to transfer    Patient left  on stretcher  with all lines intact, nurse notified, and transporter present.    GOALS:   Multidisciplinary Problems       Physical Therapy Goals          Problem: Physical Therapy    Goal Priority Disciplines Outcome Goal Variances Interventions   Physical Therapy Goal     PT, PT/OT Ongoing, Progressing     Description: PT goals until 1/10/24    1. Pt supine to sit with supervision-not met  2. Pt sit to supine with supervision-not met  3. Pt sit to stand with no AD with supervision-not met  4. Pt to perform gait 300ft with no AD with supervision.-not met  5. Pt to go up/down curb step with no AD with SBA.-not met                         History:     Past Medical History:   Diagnosis Date    Anxiety     Celiac disease 05/2018    Celiac disease     Depression     Diabetes mellitus     Family history  of breast cancer in mother      at age 68    Hyperlipidemia     Hypertension     Meniere disease     Meniere's disease, unspecified ear     Menopause     Peptic ulcer     Reflux esophagitis     Urinary tract infection     Vaginal infection     Vaginal Pap smear 2014    Pap/Hpv Negative        Past Surgical History:   Procedure Laterality Date    APPENDECTOMY      BREAST SURGERY      Tags    CARPAL TUNNEL RELEASE Bilateral 2017    ,     CLOSURE, COLOSTOMY Left 2023    Procedure: CLOSURE, COLOSTOMY;  Surgeon: Manuel Javier MD;  Location: [facility] OR;  Service: General;  Laterality: Left;    COLONOSCOPY  2018    Normal  ( Juan A)     COLOSTOMY Right 2023    Procedure: CREATION, COLOSTOMY;  Surgeon: Manuel Javier MD;  Location: [facility] OR;  Service: General;  Laterality: Right;    DILATION AND CURETTAGE OF UTERUS  1986    DUPUYTREN CONTRACTURE RELEASE Bilateral 2017    HYSTERECTOMY  1987    BEAU w/ appy, no BSO     INJECTION OF NEUROLYTIC AGENT AROUND LUMBAR SYMPATHETIC NERVE N/A 2022    Procedure: BLOCK, LUMBAR SYMPATHETIC;  Surgeon: Souleymane Rizo Jr., MD;  Location: [facility] PAIN MGT;  Service: Pain Management;  Laterality: N/A;  no pacemaker   pt is diabetic     INJECTION OF NEUROLYTIC AGENT AROUND LUMBAR SYMPATHETIC NERVE N/A 2022    Procedure: BLOCK, LUMBAR SYMPATHETIC;  Surgeon: Souleymane Rizo Jr., MD;  Location: [facility] PAIN MGT;  Service: Pain Management;  Laterality: N/A;  diabetic     INSERTION OF TUNNELED CENTRAL VENOUS HEMODIALYSIS CATHETER Right 2023    Procedure: INSERTION, CATHETER, HEMODIALYSIS, DUAL LUMEN;  Surgeon: Manuel Javier MD;  Location: [facility] OR;  Service: General;  Laterality: Right;    INSERTION, CATHETER, TUNNELED Left 2023    Procedure: Insertion,catheter,tunneled;  Surgeon: Watson Fontenot MD;  Location: [facility] OR;  Service: General;  Laterality: Left;    LAPAROTOMY, EXPLORATORY N/A 2023    Procedure: LAPAROTOMY,  EXPLORATORY;  Surgeon: Manuel Javier MD;  Location: [facility] OR;  Service: General;  Laterality: N/A;    LAPAROTOMY, EXPLORATORY N/A 1/16/2023    Procedure: LAPAROTOMY, EXPLORATORY;  Surgeon: Manuel Javier MD;  Location: [facility] OR;  Service: General;  Laterality: N/A;    LYMPHADENECTOMY Right 1/2/2024    Procedure: LYMPHADENECTOMY;  Surgeon: Tan Thomson MD;  [facility]: [facility] OR 2ND FLR;  Service: Cardiothoracic;  Laterality: Right;    REMOVAL OF CATHETER Right 1/28/2023    Procedure: REMOVAL, CATHETER;  Surgeon: Watson Fontenot MD;  Location: [facility] OR;  Service: General;  Laterality: Right;    REMOVAL, TUNNELED CATH Right 1/25/2023    Procedure: REMOVAL, TUNNELED CATH;  Surgeon: Manuel Javier MD;  Location: [facility] OR;  Service: General;  Laterality: Right;    ROBOTIC BRONCHOSCOPY N/A 10/20/2023    Procedure: ROBOTIC BRONCHOSCOPY;  Surgeon: Mayda Monzon MD;  Location: [facility] OR 2ND FLR;  Service: Pulmonary;  Laterality: N/A;    SHOULDER SURGERY  2009 & 2010    right rotator cuff    THORACOTOMY Right 1/2/2024    Procedure: THORACOTOMY;  Surgeon: Tan Thomson MD;  Location: [facility] OR 2ND FLR;  Service: Cardiothoracic;  Laterality: Right;    THORACOTOMY, WITH INITIAL THERAPEUTIC WEDGE RESECTION OF LUNG Right 1/2/2024    Procedure: THORACOTOMY, WITH INITIAL THERAPEUTIC WEDGE RESECTION OF LUNG;  Surgeon: Tan Thomson MD;  Location: [facility] OR 2ND FLR;  Service: Cardiothoracic;  Laterality: Right;    TONSILLECTOMY      UPPER GASTROINTESTINAL ENDOSCOPY  04/2018       Time Tracking:     PT Received On: 01/03/24  PT Start Time: 0815     PT Stop Time: 0827  PT Total Time (min): 12 min     Billable Minutes: Evaluation 12      01/03/2024

## 2024-01-03 NOTE — PLAN OF CARE
Problem: Physical Therapy  Goal: Physical Therapy Goal  Description: PT goals until 1/10/24    1. Pt supine to sit with supervision-not met  2. Pt sit to supine with supervision-not met  3. Pt sit to stand with no AD with supervision-not met  4. Pt to perform gait 300ft with no AD with supervision.-not met  5. Pt to go up/down curb step with no AD with SBA.-not met    Outcome: Ongoing, Progressing   Pt's goals set and pt will benefit from skilled PT services to work towards improved functional mobility including: bed mobility, transfers, up/down step, and gait.   1/3/2024

## 2024-01-03 NOTE — PT/OT/SLP EVAL
Occupational Therapy   Evaluation    Name: Lily Green  MRN: [id number]  Admitting Diagnosis: Malignant carcinoid tumor of bronchus  Recent Surgery: Procedure(s) (LRB):  LYMPHADENECTOMY (Right)  THORACOTOMY (Right)  THORACOTOMY, WITH INITIAL THERAPEUTIC WEDGE RESECTION OF LUNG (Right) 1 Day Post-Op    Recommendations:     Discharge Recommendations: Low Intensity Therapy  Discharge Equipment Recommendations:  none  Barriers to discharge:       Assessment:     Lily Green is a 73 y.o. female with a medical diagnosis of Malignant carcinoid tumor of bronchus.  She presents with pain in R shoulder. Performance deficits affecting function: weakness, gait instability, impaired balance, impaired functional mobility, impaired cardiopulmonary response to activity, impaired endurance, impaired self care skills, pain.  PTA, pt was Indp with all ADLs and fxnl mobility; pt has caregiver come by 3x/week from 7-2p to assist with IADLs. Upon eval, pt is near baseline level of fxn with ADLs, but pain in R shoulder is limiting participation and increasing assistance needed. Pt should progress and return home or to OP therapy.     Rehab Prognosis: Good; patient would benefit from acute skilled OT services to address these deficits and reach maximum level of function.       Plan:     Patient to be seen 4 x/week to address the above listed problems via self-care/home management, therapeutic activities, therapeutic exercises  Plan of Care Expires: 02/02/24  Plan of Care Reviewed with: patient, caregiver    Subjective     Chief Complaint: R shoulder pain  Patient/Family Comments/goals: return home    Occupational Profile:  Living Environment: Pt lives alone in [facility] that has a 1 step entry. Pt has walk -in shower c/ 3-1 commode and grab bar, and another commode frame over toilet.   Previous level of function: indp-mod I  Roles and Routines: retired teacher  Equipment Used at Home: shower chair, walker, rolling, 3-in-1  commode, cane, straight, grab bar  Assistance upon Discharge: yes, family and caregiver    Pain/Comfort:  Pain Rating 1: 5/10  Location - Side 1: Right  Location 1: shoulder  Pain Addressed 1: Pre-medicate for activity, Reposition, Cessation of Activity, Distraction    Patients cultural, spiritual, Hindu conflicts given the current situation: no    Objective:     Communicated with: Nsg prior to session.  Patient found supine with chest tube and family present upon OT entry to room.    General Precautions: Standard, fall  Orthopedic Precautions: N/A  Braces: N/A  Respiratory Status: Room air    Occupational Performance:    Bed Mobility:    Patient completed Rolling/Turning to Left with  stand by assistance  Patient completed Supine to Sit with maximal assistance    Functional Mobility/Transfers:  Patient completed Sit <> Stand Transfer with contact guard assistance  with  rolling walker   Patient completed Toilet Transfer Step Transfer technique with contact guard assistance with  rolling walker - Pt indicated pain when attempting to sit and chose not to perform full transfer. Simulated home environment using BSC frame over toilet  Functional Mobility: pt ambulated room level with CGA using RW    Activities of Daily Living:  Grooming: stand by assistance and setup A oral H in standing at sink  Lower Body Dressing: stand by assistance doff/on socks    Cognitive/Visual Perceptual:  A&O x 4  Pt wears glasses at all times    Physical Exam:  Pt reports R UE has undergone rotator cuff procedure 2x but is WFL  L UE is WFL  Pt strength in B UE is good 4/5    AMPAC 6 Click ADL:  AMPAC Total Score: 23    Treatment & Education:  Pt educated on role and purpose of therapy  Pt educated on goal setting  Pt educated on benefits of OOB activity  Pt educated on self advocacy+  Pt and family educated on safe bed mobility       Patient left sitting edge of bed with all lines intact, call button in reach, nsg notified, and family  present    GOALS:   Multidisciplinary Problems       Occupational Therapy Goals          Problem: Occupational Therapy    Goal Priority Disciplines Outcome Interventions   Occupational Therapy Goal     OT, PT/OT Ongoing, Progressing    Description: Goals to be met by: 24     Patient will increase functional independence with ADLs by performing:    UE Dressing with Supervision.  Grooming while standing at sink with Modified [place].  Toileting from toilet with Modified [place] for hygiene and clothing management.   Supine to sit with Supervision.  Toilet transfer to toilet with Supervision.                         History:     Past Medical History:   Diagnosis Date    Anxiety     Celiac disease 2018    Celiac disease     Depression     Diabetes mellitus     Family history of breast cancer in mother      at age 68    Hyperlipidemia     Hypertension     Meniere disease     Meniere's disease, unspecified ear     Menopause     Peptic ulcer     Reflux esophagitis     Urinary tract infection     Vaginal infection     Vaginal Pap smear 2014    Pap/Hpv Negative          Past Surgical History:   Procedure Laterality Date    APPENDECTOMY      BREAST SURGERY      Tags    CARPAL TUNNEL RELEASE Bilateral 2017    ,     CLOSURE, COLOSTOMY Left 2023    Procedure: CLOSURE, COLOSTOMY;  Surgeon: Manuel Javier MD;  Location: [facility] OR;  Service: General;  Laterality: Left;    COLONOSCOPY  2018    Normal  (Cornell Velazco)     COLOSTOMY Right 2023    Procedure: CREATION, COLOSTOMY;  Surgeon: Manuel Javier MD;  Location: [facility] OR;  Service: General;  Laterality: Right;    DILATION AND CURETTAGE OF UTERUS  1986    DUPUYTREN CONTRACTURE RELEASE Bilateral 2017    HYSTERECTOMY  1987    BEAU w/ appy, no BSO     INJECTION OF NEUROLYTIC AGENT AROUND LUMBAR SYMPATHETIC NERVE N/A 2022    Procedure: BLOCK, LUMBAR SYMPATHETIC;  Surgeon: Souleymane Rizo Jr., MD;  [facility];   Service: Pain Management;  Laterality: N/A;  no pacemaker   pt is diabetic     INJECTION OF NEUROLYTIC AGENT AROUND LUMBAR SYMPATHETIC NERVE N/A 8/25/2022    Procedure: BLOCK, LUMBAR SYMPATHETIC;  Surgeon: Souleymane Rizo Jr., MD;  Location: [facility] PAIN MGT;  Service: Pain Management;  Laterality: N/A;  diabetic     INSERTION OF TUNNELED CENTRAL VENOUS HEMODIALYSIS CATHETER Right 1/25/2023    Procedure: INSERTION, CATHETER, HEMODIALYSIS, DUAL LUMEN;  Surgeon: Manuel Javier MD;  Location: [facility] OR;  Service: General;  Laterality: Right;    INSERTION, CATHETER, TUNNELED Left 1/28/2023    Procedure: Insertion,catheter,tunneled;  Surgeon: Watson Fontenot MD;  Location: [facility] OR;  Service: General;  Laterality: Left;    LAPAROTOMY, EXPLORATORY N/A 1/14/2023    Procedure: LAPAROTOMY, EXPLORATORY;  Surgeon: Manuel Javier MD;  Location: [facility] OR;  Service: General;  Laterality: N/A;    LAPAROTOMY, EXPLORATORY N/A 1/16/2023    Procedure: LAPAROTOMY, EXPLORATORY;  Surgeon: Manuel Javier MD;  Location: [facility] OR;  Service: General;  Laterality: N/A;    LYMPHADENECTOMY Right 1/2/2024    Procedure: LYMPHADENECTOMY;  Surgeon: Tan Thomson MD;  Location: [facility] OR 2ND FLR;  Service: Cardiothoracic;  Laterality: Right;    REMOVAL OF CATHETER Right 1/28/2023    Procedure: REMOVAL, CATHETER;  Surgeon: Watson Fontenot MD;  Location: [facility] OR;  Service: General;  Laterality: Right;    REMOVAL, TUNNELED CATH Right 1/25/2023    Procedure: REMOVAL, TUNNELED CATH;  Surgeon: Manuel Javier MD;  Location: [facility] OR;  Service: General;  Laterality: Right;    ROBOTIC BRONCHOSCOPY N/A 10/20/2023    Procedure: ROBOTIC BRONCHOSCOPY;  Surgeon: Mayda Monzon MD;  Location: [facility] OR 2ND FLR;  Service: Pulmonary;  Laterality: N/A;    SHOULDER SURGERY  2009 & 2010    right rotator cuff    THORACOTOMY Right 1/2/2024    Procedure: THORACOTOMY;  Surgeon: Tan Thomson MD;  Location: [facility] OR 2ND FLR;  Service: Cardiothoracic;   Laterality: Right;    THORACOTOMY, WITH INITIAL THERAPEUTIC WEDGE RESECTION OF LUNG Right 1/2/2024    Procedure: THORACOTOMY, WITH INITIAL THERAPEUTIC WEDGE RESECTION OF LUNG;  Surgeon: Tan Thomson MD;  Location: [facility] OR [address] [place] [address];  Service: Cardiothoracic;  Laterality: Right;    TONSILLECTOMY      UPPER GASTROINTESTINAL ENDOSCOPY  04/2018       Time Tracking:     OT Date of Treatment: 01/03/24  OT Start Time: 1352  OT Stop Time: 1432  OT Total Time (min): 40 min    Billable Minutes:Evaluation 15  Self Care/Home Management 25    1/3/2024

## 2024-01-03 NOTE — PROGRESS NOTES
[facility]FF HEMODIALYSIS NOTE     Patient currently on hemodialysis for removal of uremic toxins and volume.     Patient seen and evaluated on hemodialysis, tolerating treatment, see HD flowsheet for vitals and assessments.    Labs have been reviewed and the dialysate bath has been adjusted.       Assessment/Plan:    -S/p thoracotomy POD#1  -Patient seen on HD, tolerating treatment well, w/o complaints   -UF goal of 2L  -Midodrine with HD   -Renal diet, if not NPO   -Strict I/O's and daily weights  -Daily renal function panels  -Keep MAP >65 while on HD   -Hgb goal 10-11, at goal   -Continue sevelamer 1600 TIDWM  -Will continue to follow while inpatient     Christy Gaytan DNP-FNP, C  Nephrology  Pager: [phone]

## 2024-01-03 NOTE — PROGRESS NOTES
[facility] - [facility]  Thoracic Surgery  Progress Note    Subjective:     History of Present Illness:  No notes on file    Post-Op Info:  Procedure(s) (LRB):  LYMPHADENECTOMY (Right)  THORACOTOMY (Right)  THORACOTOMY, WITH INITIAL THERAPEUTIC WEDGE RESECTION OF LUNG (Right)   1 Day Post-Op     Interval History: CATHY  Sat in chair overnight  Pain controlled  Small air leak  Going to HD today     Medications:  Continuous Infusions:  Scheduled Meds:   sodium chloride 0.9%   Intravenous Once    acetaminophen  650 mg Oral Q8H    atorvastatin  40 mg Oral QHS    heparin (porcine)  5,000 Units Subcutaneous Q8H    LIDOcaine  1 patch Transdermal Q24H    methocarbamoL  500 mg Oral BID    pantoprazole  40 mg Oral Daily    polyethylene glycol  17 g Oral Daily    pregabalin  75 mg Oral Daily    senna-docusate 8.6-50 mg  1 tablet Oral BID    sevelamer carbonate  1,600 mg Oral TID WM     PRN Meds:acetaminophen, bisacodyL, dextrose 10%, dextrose 10%, glucagon (human recombinant), glucose, glucose, insulin aspart U-100, ondansetron, oxyCODONE, oxyCODONE     Review of patient's allergies indicates:   Allergen Reactions    Crestor [rosuvastatin] Swelling    Gluten protein      Objective:     Vital Signs (Most Recent):  Temp: 98.3 °F (36.8 °C) (01/03/24 0804)  Pulse: 83 (01/03/24 0804)  Resp: 18 (01/03/24 0823)  BP: (!) 127/59 (01/03/24 0804)  SpO2: 95 % (01/03/24 0804) Vital Signs (24h Range):  Temp:  [97.7 °F (36.5 °C)-98.8 °F (37.1 °C)] 98.3 °F (36.8 °C)  Pulse:  [66-88] 83  Resp:  [13-20] 18  SpO2:  [92 %-100 %] 95 %  BP: (115-159)/(55-72) 127/59     Intake/Output - Last 3 Shifts         01/01 0700  01/02 0659 01/02 0700  01/03 0659 01/03 0700  01/04 0659    P.O.  400     I.V. (mL/kg)  500 (7.6)     Blood  1090     IV Piggyback  2000     Total Intake(mL/kg)  3990 (60.8)     Urine (mL/kg/hr)  0 (0)     Chest Tube  500     Total Output  500     Net  +3490            Stool Occurrence   0 x            SpO2: 95 %        Physical Exam  Vitals  and nursing note reviewed.   Constitutional:       Appearance: Normal appearance.   HENT:      Head: Normocephalic and atraumatic.   Cardiovascular:      Rate and Rhythm: Normal rate and regular rhythm.   Pulmonary:      Effort: Pulmonary effort is normal. No respiratory distress.      Comments: Right posterior chest incision c/d/I  Chest port sites c/d/I  Chest tube in place with serosanguinous output, small air leak with cough   Abdominal:      General: Abdomen is flat. There is no distension.      Palpations: Abdomen is soft. There is no mass.      Tenderness: There is no abdominal tenderness.      Hernia: No hernia is present.   Musculoskeletal:      Right lower leg: No edema.      Left lower leg: No edema.   Skin:     General: Skin is warm and dry.   Neurological:      General: No focal deficit present.      Mental Status: She is alert and oriented to person, place, and time.   Psychiatric:         Mood and Affect: Mood normal.         Behavior: Behavior normal.            Significant Labs:  BMP:   Recent Labs   Lab 01/03/24  0605   *   *   K 5.6*   CL 95   CO2 23   BUN 48*   CREATININE 5.8*   CALCIUM 9.9     CBC:   Recent Labs   Lab 01/03/24  0605   WBC 9.06   RBC 3.82*   HGB 11.5*   HCT 35.2*      MCV 92   MCH 30.1   MCHC 32.7       Significant Diagnostics:  CT: I have reviewed all pertinent results/findings within the past 24 hours    VTE Risk Mitigation (From admission, onward)           Ordered     heparin (porcine) injection 5,000 Units  Every 8 hours         01/02/24 1226     IP VTE HIGH RISK PATIENT  Once         01/02/24 1226     Place CARMELITA hose  Until discontinued         01/02/24 1226     Place sequential compression device  Until discontinued         01/02/24 1226                  Assessment/Plan:     * Malignant carcinoid tumor of bronchus  74 yo female with carcinoid tumor of the right middle lobe s/p thoracotomy and resection with mediastinal lymph node dissection 1/2/2023    -  CT to suction  - Daily CXR   - Renal diet  - Nephrology following, appreciate assistance. HD 1/3/2023  - Daily RFP   - MM pain control  - Home meds reviewed, restarted as appropriate   - PT/OT  - DVT ppx, SQH         Kat Flores MD  Thoracic Surgery  [facility]

## 2024-01-03 NOTE — CARE UPDATE
-Glucose Goal 140-180    -A1C:   Hemoglobin A1C   Date Value Ref Range Status   11/15/2023 4.9 4.0 - 5.6 % Final     Comment:     ADA Screening Guidelines:  5.7-6.4%  Consistent with prediabetes  >or=6.5%  Consistent with diabetes    High levels of fetal hemoglobin interfere with the HbA1C  assay. Heterozygous hemoglobin variants (HbS, HgC, etc)do  not significantly interfere with this assay.   However, presence of multiple variants may affect accuracy.     10/23/2019 6.8 % Final         -HOME REGIMEN:   LDC SSI   Blood sugar 150 to 200, + 1 unit  Blood sugar 201 to 250, + 2 units  Blood sugar 251 to 300, + 3 units  Blood sugar 301 to 350, + 4 units   Blood sugar greater than 350, + 5 units      -GLUCOSE TREND FOR THE PAST 24HRS:   Recent Labs   Lab 01/02/24  1235 01/02/24  1719 01/02/24  2004 01/02/24  2354 01/03/24  0434 01/03/24  0806   POCTGLUCOSE 174* 164* 157* 161* 138* 121*         -NO HYPOGYCEMIAS NOTED     - Diet  Diet diabetic Renal; 2000 Calorie    -TOLERATING 25 % OF PO DIET     -NPO? No     -Steroids - none     -Tube Feeds - n/a         Plan:   - Novolog Low dose correction with ISF 50 starting at 150 prn ac/hs   - POCT Glucose before meals and at bedtime  - Hypoglycemia protocol in place      ** Please notify Endocrine for any change and/or advance in diet**  ** Please call Endocrine for any BG related issues **     Discharge Planning:   TBD. Please notify endocrinology prior to discharge.

## 2024-01-03 NOTE — PROGRESS NOTES
Patient arrived in a wheelchair to dialysis unit.   Report received from Melvin Ferguson RN  VS's per dialysis Flowsheet.    ESRD on outpatient TTS schedule.  Hemodialysis initiated using the following:    Dialysis Access: right TDC    Aspirated, flushed, and accessed using aseptic technique.    Will Maintain telemetry and blood pressure monitoring throughout treatment.  Refer to dialysis flowsheet and MAR for details.

## 2024-01-03 NOTE — ASSESSMENT & PLAN NOTE
72 yo female with ESRD (TTS), DDD, benign essential tremor, meniere's disease, HTN, HLD, DM2, GERD, Celiac's, IBS and 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 HD 1/3/2023  - Daily RFP   - MM pain control  - Home meds reviewed, restarted as appropriate   - PT/OT  - DVT ppx, SQH

## 2024-01-03 NOTE — ASSESSMENT & PLAN NOTE
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 HD 1/3/2023  - Daily RFP   - MM pain control  - Home meds reviewed, restarted as appropriate   - PT/OT  - DVT ppx, SQH

## 2024-01-04 ENCOUNTER — PES CALL (OUTPATIENT)
Dept: HOME HEALTH SERVICES | Facility: CLINIC | Age: 74
End: 2024-01-04
Payer: MEDICARE

## 2024-01-04 LAB
ALBUMIN SERPL BCP-MCNC: 3.2 G/DL (ref 3.5–5.2)
ANION GAP SERPL CALC-SCNC: 14 MMOL/L (ref 8–16)
BUN SERPL-MCNC: 35 MG/DL (ref 8–23)
CALCIUM SERPL-MCNC: 9.4 MG/DL (ref 8.7–10.5)
CHLORIDE SERPL-SCNC: 96 MMOL/L (ref 95–110)
CO2 SERPL-SCNC: 23 MMOL/L (ref 23–29)
CREAT SERPL-MCNC: 4.4 MG/DL (ref 0.5–1.4)
EST. GFR  (NO RACE VARIABLE): 10.1 ML/MIN/1.73 M^2
GLUCOSE SERPL-MCNC: 146 MG/DL (ref 70–110)
PHOSPHATE SERPL-MCNC: 4.7 MG/DL (ref 2.7–4.5)
POCT GLUCOSE: 131 MG/DL (ref 70–110)
POCT GLUCOSE: 143 MG/DL (ref 70–110)
POCT GLUCOSE: 146 MG/DL (ref 70–110)
POCT GLUCOSE: 193 MG/DL (ref 70–110)
POCT GLUCOSE: 194 MG/DL (ref 70–110)
POTASSIUM SERPL-SCNC: 5.1 MMOL/L (ref 3.5–5.1)
SODIUM SERPL-SCNC: 133 MMOL/L (ref 136–145)

## 2024-01-04 PROCEDURE — 36415 COLL VENOUS BLD VENIPUNCTURE: CPT | Performed by: PHYSICIAN ASSISTANT

## 2024-01-04 PROCEDURE — 11000001 HC ACUTE MED/SURG PRIVATE ROOM

## 2024-01-04 PROCEDURE — 20600001 HC STEP DOWN PRIVATE ROOM

## 2024-01-04 PROCEDURE — 97110 THERAPEUTIC EXERCISES: CPT | Mod: CQ

## 2024-01-04 PROCEDURE — 63600175 PHARM REV CODE 636 W HCPCS: Performed by: PHYSICIAN ASSISTANT

## 2024-01-04 PROCEDURE — 97116 GAIT TRAINING THERAPY: CPT | Mod: CQ

## 2024-01-04 PROCEDURE — 80069 RENAL FUNCTION PANEL: CPT | Performed by: PHYSICIAN ASSISTANT

## 2024-01-04 PROCEDURE — 25000003 PHARM REV CODE 250: Performed by: PHYSICIAN ASSISTANT

## 2024-01-04 PROCEDURE — 25000003 PHARM REV CODE 250: Performed by: SURGERY

## 2024-01-04 PROCEDURE — 99232 SBSQ HOSP IP/OBS MODERATE 35: CPT | Mod: ,,, | Performed by: NURSE PRACTITIONER

## 2024-01-04 RX ORDER — SODIUM CHLORIDE 9 MG/ML
INJECTION, SOLUTION INTRAVENOUS ONCE
Status: DISCONTINUED | OUTPATIENT
Start: 2024-01-05 | End: 2024-01-05

## 2024-01-04 RX ADMIN — METHOCARBAMOL 500 MG: 500 TABLET ORAL at 09:01

## 2024-01-04 RX ADMIN — SEVELAMER CARBONATE 1600 MG: 800 TABLET, FILM COATED ORAL at 05:01

## 2024-01-04 RX ADMIN — ACETAMINOPHEN 650 MG: 325 TABLET ORAL at 06:01

## 2024-01-04 RX ADMIN — SENNOSIDES AND DOCUSATE SODIUM 1 TABLET: 8.6; 5 TABLET ORAL at 09:01

## 2024-01-04 RX ADMIN — SEVELAMER CARBONATE 1600 MG: 800 TABLET, FILM COATED ORAL at 11:01

## 2024-01-04 RX ADMIN — HEPARIN SODIUM 5000 UNITS: 5000 INJECTION INTRAVENOUS; SUBCUTANEOUS at 06:01

## 2024-01-04 RX ADMIN — ACETAMINOPHEN 650 MG: 325 TABLET ORAL at 10:01

## 2024-01-04 RX ADMIN — OXYCODONE HYDROCHLORIDE 5 MG: 5 TABLET ORAL at 08:01

## 2024-01-04 RX ADMIN — ATORVASTATIN CALCIUM 40 MG: 40 TABLET, FILM COATED ORAL at 08:01

## 2024-01-04 RX ADMIN — POLYETHYLENE GLYCOL 3350 17 G: 17 POWDER, FOR SOLUTION ORAL at 09:01

## 2024-01-04 RX ADMIN — TORSEMIDE 40 MG: 20 TABLET ORAL at 09:01

## 2024-01-04 RX ADMIN — SEVELAMER CARBONATE 1600 MG: 800 TABLET, FILM COATED ORAL at 06:01

## 2024-01-04 RX ADMIN — PANTOPRAZOLE SODIUM 40 MG: 40 TABLET, DELAYED RELEASE ORAL at 09:01

## 2024-01-04 RX ADMIN — ACETAMINOPHEN 650 MG: 325 TABLET ORAL at 02:01

## 2024-01-04 RX ADMIN — OXYCODONE HYDROCHLORIDE 10 MG: 10 TABLET ORAL at 12:01

## 2024-01-04 RX ADMIN — OXYCODONE HYDROCHLORIDE 10 MG: 10 TABLET ORAL at 02:01

## 2024-01-04 RX ADMIN — LIDOCAINE 5% 1 PATCH: 700 PATCH TOPICAL at 09:01

## 2024-01-04 RX ADMIN — SENNOSIDES AND DOCUSATE SODIUM 1 TABLET: 8.6; 5 TABLET ORAL at 08:01

## 2024-01-04 RX ADMIN — OXYCODONE HYDROCHLORIDE 10 MG: 10 TABLET ORAL at 09:01

## 2024-01-04 RX ADMIN — PREGABALIN 75 MG: 75 CAPSULE ORAL at 05:01

## 2024-01-04 RX ADMIN — HEPARIN SODIUM 5000 UNITS: 5000 INJECTION INTRAVENOUS; SUBCUTANEOUS at 02:01

## 2024-01-04 RX ADMIN — HEPARIN SODIUM 5000 UNITS: 5000 INJECTION INTRAVENOUS; SUBCUTANEOUS at 10:01

## 2024-01-04 RX ADMIN — METHOCARBAMOL 500 MG: 500 TABLET ORAL at 08:01

## 2024-01-04 NOTE — PHYSICIAN QUERY
PT Name: Lily Green  MR #: [id number]    DOCUMENTATION CLARIFICATION     CDS/: Tiffany Jha RN              Contact information: [email or web address]    This form is a permanent document in the medical record.     Query Date: January 4, 2024    Dear Provider,  By submitting this query, we are merely seeking further clarification of documentation. Please utilize your independent clinical judgment when addressing the question(s) below.    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

## 2024-01-04 NOTE — PROGRESS NOTES
[facility]  Thoracic Surgery  Progress Note    Subjective:     History of Present Illness:  No notes on file    Post-Op Info:  Procedure(s) (LRB):  LYMPHADENECTOMY (Right)  THORACOTOMY (Right)  THORACOTOMY, WITH INITIAL THERAPEUTIC WEDGE RESECTION OF LUNG (Right)   2 Days Post-Op     Interval History: NAEON  Ambulating with assistance of walker  Dialyzed yesterday  Air leak resolved   More pain than initial POD 1 am, but seems well controlled.     Medications:  Continuous Infusions:  Scheduled Meds:   acetaminophen  650 mg Oral Q8H    atorvastatin  40 mg Oral QHS    heparin (porcine)  5,000 Units Subcutaneous Q8H    LIDOcaine  1 patch Transdermal Q24H    methocarbamoL  500 mg Oral BID    pantoprazole  40 mg Oral Daily    polyethylene glycol  17 g Oral Daily    pregabalin  75 mg Oral Daily    senna-docusate 8.6-50 mg  1 tablet Oral BID    sevelamer carbonate  1,600 mg Oral TID WM    torsemide  40 mg Oral Daily     PRN Meds:acetaminophen, bisacodyL, dextrose 10%, dextrose 10%, glucagon (human recombinant), glucose, glucose, heparin (porcine), insulin aspart U-100, ondansetron, oxyCODONE, oxyCODONE, simethicone     Review of patient's allergies indicates:   Allergen Reactions    Crestor [rosuvastatin] Swelling    Gluten protein      Objective:     Vital Signs (Most Recent):  Temp: 98.1 °F (36.7 °C) (01/04/24 0645)  Pulse: 90 (01/04/24 0501)  Resp: 18 (01/04/24 0501)  BP: (!) 100/52 (01/04/24 0501)  SpO2: (!) 93 % (01/04/24 0501) Vital Signs (24h Range):  Temp:  [97.8 °F (36.6 °C)-98.7 °F (37.1 °C)] 98.1 °F (36.7 °C)  Pulse:  [] 90  Resp:  [16-20] 18  SpO2:  [93 %-96 %] 93 %  BP: ()/(52-80) 100/52     Intake/Output - Last 3 Shifts         01/02 0700  01/03 0659 01/03 0700  01/04 0659 01/04 0700  01/05 0659    P.O. 400 250     I.V. (mL/kg) 500 (7.6) 293 (4.5)     Blood 1090      Other  300     IV Piggyback 2000      Total Intake(mL/kg) 3990 (60.8) 843 (12.9)     Urine (mL/kg/hr) 0 (0)      Other  3100      Stool  0     Chest Tube 500 100     Total Output 500 3200     Net +3490 -2357            Stool Occurrence  0 x             SpO2: (!) 93 %       Physical Exam  Vitals and nursing note reviewed.   Constitutional:       Appearance: Normal appearance.   HENT:      Head: Normocephalic and atraumatic.   Cardiovascular:      Rate and Rhythm: Normal rate and regular rhythm.   Pulmonary:      Effort: Pulmonary effort is normal. No respiratory distress.      Comments: Right posterior chest incision c/d/I  Chest port sites c/d/I  Chest tube in place with serosanguinous output, no air leak   Abdominal:      General: Abdomen is flat. There is no distension.      Palpations: Abdomen is soft. There is no mass.      Tenderness: There is no abdominal tenderness.      Hernia: No hernia is present.   Musculoskeletal:      Right lower leg: No edema.      Left lower leg: No edema.   Skin:     General: Skin is warm and dry.   Neurological:      General: No focal deficit present.      Mental Status: She is alert and oriented to person, place, and time.   Psychiatric:         Mood and Affect: Mood normal.         Behavior: Behavior normal.            Significant Labs:  BMP:   Recent Labs   Lab 01/04/24  0315   *   *   K 5.1   CL 96   CO2 23   BUN 35*   CREATININE 4.4*   CALCIUM 9.4       CBC:   Recent Labs   Lab 01/03/24  0605   WBC 9.06   RBC 3.82*   HGB 11.5*   HCT 35.2*      MCV 92   MCH 30.1   MCHC 32.7         Significant Diagnostics:  CT: I have reviewed all pertinent results/findings within the past 24 hours    VTE Risk Mitigation (From admission, onward)           Ordered     heparin (porcine) injection 1,000 Units  As needed (PRN)         01/03/24 1123     heparin (porcine) injection 5,000 Units  Every 8 hours         01/02/24 1226     IP VTE HIGH RISK PATIENT  Once         01/02/24 1226     Place CARMELITA hose  Until discontinued         01/02/24 1226     Place sequential compression device  Until discontinued          01/02/24 1226                  Assessment/Plan:     * Malignant carcinoid tumor of bronchus  74 yo female with ESRD (TTS), DDD, benign essential tremor, meniere's disease, HTN, HLD, DM2, GERD, Celiac's, IBS and 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 HD 1/3/2023  - Daily RFP   - MM pain control  - Home meds reviewed, restarted as appropriate   - PT/OT  - DVT ppx, SQH         Kat Flores MD  Thoracic Surgery  [facility]

## 2024-01-04 NOTE — SUBJECTIVE & OBJECTIVE
Interval History: NAEON  Ambulating with assistance of walker  Dialyzed yesterday  Air leak resolved   More pain than initial POD 1 am, but seems well controlled.     Medications:  Continuous Infusions:  Scheduled Meds:   acetaminophen  650 mg Oral Q8H    atorvastatin  40 mg Oral QHS    heparin (porcine)  5,000 Units Subcutaneous Q8H    LIDOcaine  1 patch Transdermal Q24H    methocarbamoL  500 mg Oral BID    pantoprazole  40 mg Oral Daily    polyethylene glycol  17 g Oral Daily    pregabalin  75 mg Oral Daily    senna-docusate 8.6-50 mg  1 tablet Oral BID    sevelamer carbonate  1,600 mg Oral TID WM    torsemide  40 mg Oral Daily     PRN Meds:acetaminophen, bisacodyL, dextrose 10%, dextrose 10%, glucagon (human recombinant), glucose, glucose, heparin (porcine), insulin aspart U-100, ondansetron, oxyCODONE, oxyCODONE, simethicone     Review of patient's allergies indicates:   Allergen Reactions    Crestor [rosuvastatin] Swelling    Gluten protein      Objective:     Vital Signs (Most Recent):  Temp: 98.1 °F (36.7 °C) (01/04/24 0645)  Pulse: 90 (01/04/24 0501)  Resp: 18 (01/04/24 0501)  BP: (!) 100/52 (01/04/24 0501)  SpO2: (!) 93 % (01/04/24 0501) Vital Signs (24h Range):  Temp:  [97.8 °F (36.6 °C)-98.7 °F (37.1 °C)] 98.1 °F (36.7 °C)  Pulse:  [] 90  Resp:  [16-20] 18  SpO2:  [93 %-96 %] 93 %  BP: ()/(52-80) 100/52     Intake/Output - Last 3 Shifts         01/02 0700  01/03 0659 01/03 0700  01/04 0659 01/04 0700  01/05 0659    P.O. 400 250     I.V. (mL/kg) 500 (7.6) 293 (4.5)     Blood 1090      Other  300     IV Piggyback 2000      Total Intake(mL/kg) 3990 (60.8) 843 (12.9)     Urine (mL/kg/hr) 0 (0)      Other  3100     Stool  0     Chest Tube 500 100     Total Output 500 3200     Net +3490 -2357            Stool Occurrence  0 x             SpO2: (!) 93 %        Physical Exam  Vitals and nursing note reviewed.   Constitutional:       Appearance: Normal appearance.   HENT:      Head: Normocephalic and  atraumatic.   Cardiovascular:      Rate and Rhythm: Normal rate and regular rhythm.   Pulmonary:      Effort: Pulmonary effort is normal. No respiratory distress.      Comments: Right posterior chest incision c/d/I  Chest port sites c/d/I  Chest tube in place with serosanguinous output, no air leak   Abdominal:      General: Abdomen is flat. There is no distension.      Palpations: Abdomen is soft. There is no mass.      Tenderness: There is no abdominal tenderness.      Hernia: No hernia is present.   Musculoskeletal:      Right lower leg: No edema.      Left lower leg: No edema.   Skin:     General: Skin is warm and dry.   Neurological:      General: No focal deficit present.      Mental Status: She is alert and oriented to person, place, and time.   Psychiatric:         Mood and Affect: Mood normal.         Behavior: Behavior normal.            Significant Labs:  BMP:   Recent Labs   Lab 01/04/24  0315   *   *   K 5.1   CL 96   CO2 23   BUN 35*   CREATININE 4.4*   CALCIUM 9.4       CBC:   Recent Labs   Lab 01/03/24  0605   WBC 9.06   RBC 3.82*   HGB 11.5*   HCT 35.2*      MCV 92   MCH 30.1   MCHC 32.7         Significant Diagnostics:  CT: I have reviewed all pertinent results/findings within the past 24 hours    VTE Risk Mitigation (From admission, onward)           Ordered     heparin (porcine) injection 1,000 Units  As needed (PRN)         01/03/24 1123     heparin (porcine) injection 5,000 Units  Every 8 hours         01/02/24 1226     IP VTE HIGH RISK PATIENT  Once         01/02/24 1226     Place CARMELITA hose  Until discontinued         01/02/24 1226     Place sequential compression device  Until discontinued         01/02/24 1226

## 2024-01-04 NOTE — PROGRESS NOTES
[facility]  Nephrology  Progress Note    Patient Name: Lily Green  MRN: [id number]  Admission Date: 1/2/2024  Hospital Length of Stay: 2 days  Attending Provider: Tan Thomson MD   Primary Care Physician: Pavel Calixto MD  Principal Problem:Malignant carcinoid tumor of bronchus    Subjective:     Interval History: POD #2. HD yesterday, tolerated well.     Review of patient's allergies indicates:   Allergen Reactions    Crestor [rosuvastatin] Swelling    Gluten protein      Current Facility-Administered Medications   Medication Frequency    [START ON 1/5/2024] 0.9%  NaCl infusion Once    acetaminophen tablet 650 mg Q4H PRN    acetaminophen tablet 650 mg Q8H    atorvastatin tablet 40 mg QHS    bisacodyL suppository 10 mg Daily PRN    dextrose 10% bolus 125 mL 125 mL PRN    dextrose 10% bolus 250 mL 250 mL PRN    glucagon (human recombinant) injection 1 mg PRN    glucose chewable tablet 16 g PRN    glucose chewable tablet 24 g PRN    heparin (porcine) injection 1,000 Units PRN    heparin (porcine) injection 5,000 Units Q8H    insulin aspart U-100 pen 0-5 Units Q4H PRN    LIDOcaine 5 % patch 1 patch Q24H    methocarbamoL tablet 500 mg BID    ondansetron disintegrating tablet 8 mg Q8H PRN    oxyCODONE immediate release tablet 5 mg Q4H PRN    oxyCODONE immediate release tablet Tab 10 mg Q4H PRN    pantoprazole EC tablet 40 mg Daily    polyethylene glycol packet 17 g Daily    pregabalin capsule 75 mg Daily    senna-docusate 8.6-50 mg per tablet 1 tablet BID    sevelamer carbonate tablet 1,600 mg TID WM    simethicone chewable tablet 80 mg TID PRN    torsemide tablet 40 mg Daily       Objective:     Vital Signs (Most Recent):  Temp: 99.1 °F (37.3 °C) (01/04/24 0734)  Pulse: 91 (01/04/24 0734)  Resp: 16 (01/04/24 0904)  BP: (!) 97/58 (01/04/24 0734)  SpO2: (!) 92 % (01/04/24 0734) Vital Signs (24h Range):  Temp:  [97.8 °F (36.6 °C)-99.1 °F (37.3 °C)] 99.1 °F (37.3 °C)  Pulse:  [] 91  Resp:   [16-20] 16  SpO2:  [92 %-96 %] 92 %  BP: ()/(52-78) 97/58     Weight: 64.6 kg (142 lb 6.7 oz) (01/04/24 0600)  Body mass index is 23.7 kg/m².  Body surface area is 1.72 meters squared.    I/O last 3 completed shifts:  In: 843 [P.O.:250; I.V.:293; Other:300]  Out: 3380 [Other:3100; Chest Tube:280]     Physical Exam  Vitals and nursing note reviewed.   Constitutional:       Appearance: Normal appearance.   HENT:      Head: Normocephalic and atraumatic.   Cardiovascular:      Rate and Rhythm: Normal rate and regular rhythm.   Pulmonary:      Effort: Pulmonary effort is normal. No respiratory distress.      Comments: Chest tube   Abdominal:      General: Abdomen is flat. There is no distension.      Palpations: Abdomen is soft. There is no mass.      Tenderness: There is no abdominal tenderness.      Hernia: No hernia is present.   Musculoskeletal:      Right lower leg: No edema.      Left lower leg: No edema.   Skin:     General: Skin is warm and dry.   Neurological:      General: No focal deficit present.      Mental Status: She is alert and oriented to person, place, and time.   Psychiatric:         Mood and Affect: Mood normal.         Behavior: Behavior normal.          Significant Labs:  CBC:   Recent Labs   Lab 01/03/24  0605   WBC 9.06   RBC 3.82*   HGB 11.5*   HCT 35.2*      MCV 92   MCH 30.1   MCHC 32.7     CMP:   Recent Labs   Lab 01/02/24  0626 01/02/24  1242 01/04/24  0315      < > 146*   CALCIUM 10.5   < > 9.4   ALBUMIN 4.0   < > 3.2*   PROT 8.7*  --   --       < > 133*   K 5.7*   < > 5.1   CO2 30*   < > 23   CL 92*   < > 96   BUN 31*   < > 35*   CREATININE 4.6*   < > 4.4*   ALKPHOS 108  --   --    ALT 29  --   --    AST 29  --   --    BILITOT 0.8  --   --     < > = values in this interval not displayed.     All labs within the past 24 hours have been reviewed.         Assessment/Plan:     Renal/  ESRD (end stage renal disease)  S/p R thoracotomy due to RML carcinoid tumor. On  HD ~ 1 year. Last HD prior to presentation 1/1/24. Pt has NELY AVF placed on 10/23 that has not been cleared by vascular. Currently using RIJ TDC.     Nephrology History  iHD Schedule: TTS   Unit/MD: [facility]/ Dr. Vyas  Duration: 3.5 hours   UF:   EDW: 58 kg   Access: RIJ TDC  Residual Renal Function: minimal     Assessment:   - Continue MWF iHD schedule while IP   - midodrine as needed with HD   - continue torsemide   - continue sevelamer 800 TIDWM  - Hgb goal 10-11, continue epo with HD   - Renal diet, if not NPO    -pre and post HD weight   - Continue to monitor intake and output, daily weights   - Avoid nephrotoxic medication and renal dose medications to GFR                      Oncology  * Malignant carcinoid tumor of bronchus  -management per primary     Endocrine  Type 2 diabetes mellitus with diabetic polyneuropathy, without long-term current use of insulin  -management per primary         Thank you for your consult. I will follow-up with patient. Please contact us if you have any additional questions.    Christy Gaytan DNP  Nephrology  [facility]

## 2024-01-04 NOTE — PLAN OF CARE
ISSU Plan of Care Note  Dx Final diagnoses:  [D3A.090] Carcinoid tumor of lung (Primary)        Shift Events: Pt dialysized,       Neuro: A&O4     Vital Signs: VSS     Respiratory: RA     Diet: clears     Urine Output/Bowel Movement: no bm, pt on HD    Drains: RCT to CLWS     Activity level? X1      Any scheduled procedures? no     Any safety concerns? no

## 2024-01-04 NOTE — SUBJECTIVE & OBJECTIVE
Interval History: POD #2. HD yesterday, tolerated well.     Review of patient's allergies indicates:   Allergen Reactions    Crestor [rosuvastatin] Swelling    Gluten protein      Current Facility-Administered Medications   Medication Frequency    [START ON 1/5/2024] 0.9%  NaCl infusion Once    acetaminophen tablet 650 mg Q4H PRN    acetaminophen tablet 650 mg Q8H    atorvastatin tablet 40 mg QHS    bisacodyL suppository 10 mg Daily PRN    dextrose 10% bolus 125 mL 125 mL PRN    dextrose 10% bolus 250 mL 250 mL PRN    glucagon (human recombinant) injection 1 mg PRN    glucose chewable tablet 16 g PRN    glucose chewable tablet 24 g PRN    heparin (porcine) injection 1,000 Units PRN    heparin (porcine) injection 5,000 Units Q8H    insulin aspart U-100 pen 0-5 Units Q4H PRN    LIDOcaine 5 % patch 1 patch Q24H    methocarbamoL tablet 500 mg BID    ondansetron disintegrating tablet 8 mg Q8H PRN    oxyCODONE immediate release tablet 5 mg Q4H PRN    oxyCODONE immediate release tablet Tab 10 mg Q4H PRN    pantoprazole EC tablet 40 mg Daily    polyethylene glycol packet 17 g Daily    pregabalin capsule 75 mg Daily    senna-docusate 8.6-50 mg per tablet 1 tablet BID    sevelamer carbonate tablet 1,600 mg TID WM    simethicone chewable tablet 80 mg TID PRN    torsemide tablet 40 mg Daily       Objective:     Vital Signs (Most Recent):  Temp: 99.1 °F (37.3 °C) (01/04/24 0734)  Pulse: 91 (01/04/24 0734)  Resp: 16 (01/04/24 0904)  BP: (!) 97/58 (01/04/24 0734)  SpO2: (!) 92 % (01/04/24 0734) Vital Signs (24h Range):  Temp:  [97.8 °F (36.6 °C)-99.1 °F (37.3 °C)] 99.1 °F (37.3 °C)  Pulse:  [] 91  Resp:  [16-20] 16  SpO2:  [92 %-96 %] 92 %  BP: ()/(52-78) 97/58     Weight: 64.6 kg (142 lb 6.7 oz) (01/04/24 0600)  Body mass index is 23.7 kg/m².  Body surface area is 1.72 meters squared.    I/O last 3 completed shifts:  In: 843 [P.O.:250; I.V.:293; Other:300]  Out: 3380 [Other:3100; Chest Tube:280]     Physical  Exam  Vitals and nursing note reviewed.   Constitutional:       Appearance: Normal appearance.   HENT:      Head: Normocephalic and atraumatic.   Cardiovascular:      Rate and Rhythm: Normal rate and regular rhythm.   Pulmonary:      Effort: Pulmonary effort is normal. No respiratory distress.      Comments: Chest tube   Abdominal:      General: Abdomen is flat. There is no distension.      Palpations: Abdomen is soft. There is no mass.      Tenderness: There is no abdominal tenderness.      Hernia: No hernia is present.   Musculoskeletal:      Right lower leg: No edema.      Left lower leg: No edema.   Skin:     General: Skin is warm and dry.   Neurological:      General: No focal deficit present.      Mental Status: She is alert and oriented to person, place, and time.   Psychiatric:         Mood and Affect: Mood normal.         Behavior: Behavior normal.          Significant Labs:  CBC:   Recent Labs   Lab 01/03/24  0605   WBC 9.06   RBC 3.82*   HGB 11.5*   HCT 35.2*      MCV 92   MCH 30.1   MCHC 32.7     CMP:   Recent Labs   Lab 01/02/24  0626 01/02/24  1242 01/04/24  0315      < > 146*   CALCIUM 10.5   < > 9.4   ALBUMIN 4.0   < > 3.2*   PROT 8.7*  --   --       < > 133*   K 5.7*   < > 5.1   CO2 30*   < > 23   CL 92*   < > 96   BUN 31*   < > 35*   CREATININE 4.6*   < > 4.4*   ALKPHOS 108  --   --    ALT 29  --   --    AST 29  --   --    BILITOT 0.8  --   --     < > = values in this interval not displayed.     All labs within the past 24 hours have been reviewed.

## 2024-01-04 NOTE — PLAN OF CARE
ISSU Plan of Care Note  Dx Final diagnoses:  [D3A.090] Carcinoid tumor of lung (Primary)        Shift Events: New PIV 22 RW, CT to WS     Neuro: A&O4     Vital Signs: VSS     Respiratory: RA     Diet: clears     Urine Output/Bowel Movement: no bm, pt on HD     Drains: RCT to CLWS     Activity level? X1      Any scheduled procedures? no     Any safety concerns? no

## 2024-01-04 NOTE — ASSESSMENT & PLAN NOTE
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 On HD ~ 1 year. Last HD prior to presentation 1/1/24. Pt has NELY AVF placed on 10/23 that has not been cleared by vascular. Currently using RIJ TDC.     Nephrology History  iHD Schedule: TTS   Unit/MD: [facility]/ Dr. Vyas  Duration: 3.5 hours   UF:   EDW: 58 kg   Access: RIJ TDC  Residual Renal Function: minimal     Assessment:   - Continue MWF iHD schedule while IP   - midodrine as needed with HD   - continue torsemide   - continue sevelamer 800 TIDWM  - Hgb goal 10-11, continue epo with HD   - Renal diet, if not NPO    -pre and post HD weight   - Continue to monitor intake and output, daily weights   - Avoid nephrotoxic medication and renal dose medications to GFR

## 2024-01-04 NOTE — CARE UPDATE
-Glucose Goal 140-180    -A1C:   Hemoglobin A1C   Date Value Ref Range Status   11/15/2023 4.9 4.0 - 5.6 % Final     Comment:     ADA Screening Guidelines:  5.7-6.4%  Consistent with prediabetes  >or=6.5%  Consistent with diabetes    High levels of fetal hemoglobin interfere with the HbA1C  assay. Heterozygous hemoglobin variants (HbS, HgC, etc)do  not significantly interfere with this assay.   However, presence of multiple variants may affect accuracy.     10/23/2019 6.8 % Final         -HOME REGIMEN:   LDC SSI   Blood sugar 150 to 200, + 1 unit  Blood sugar 201 to 250, + 2 units  Blood sugar 251 to 300, + 3 units  Blood sugar 301 to 350, + 4 units   Blood sugar greater than 350, + 5 units      -GLUCOSE TREND FOR THE PAST 24HRS:   Recent Labs   Lab 01/03/24  0434 01/03/24  0806 01/03/24  1132 01/03/24  1612 01/03/24  2350 01/04/24  0733   POCTGLUCOSE 138* 121* 121* 233* 158* 146*           -NO HYPOGYCEMIAS NOTED     - Diet  Diet diabetic Renal, Gluten Free, Soft & Bite Sized (IDDSI Level 6); 2000 Calorie    -TOLERATING 75 % OF PO DIET     -NPO? No     -Steroids - none     -Tube Feeds - n/a         Plan:   - Novolog Low dose correction with ISF 50 starting at 150 prn ac/hs   - POCT Glucose before meals and at bedtime  - Hypoglycemia protocol in place      ** Please notify Endocrine for any change and/or advance in diet**  ** Please call Endocrine for any BG related issues **     Discharge Planning:   TBD. Please notify endocrinology prior to discharge.

## 2024-01-05 PROBLEM — I48.91 ATRIAL FIBRILLATION WITH RAPID VENTRICULAR RESPONSE: Status: ACTIVE | Noted: 2024-01-05

## 2024-01-05 LAB
ALBUMIN SERPL BCP-MCNC: 2.8 G/DL (ref 3.5–5.2)
ALBUMIN SERPL BCP-MCNC: 3 G/DL (ref 3.5–5.2)
ALP SERPL-CCNC: 95 U/L (ref 55–135)
ALT SERPL W/O P-5'-P-CCNC: <5 U/L (ref 10–44)
ANION GAP SERPL CALC-SCNC: 14 MMOL/L (ref 8–16)
ANION GAP SERPL CALC-SCNC: 14 MMOL/L (ref 8–16)
AST SERPL-CCNC: 23 U/L (ref 10–40)
BASOPHILS # BLD AUTO: 0.02 K/UL (ref 0–0.2)
BASOPHILS NFR BLD: 0.2 % (ref 0–1.9)
BILIRUB SERPL-MCNC: 1.1 MG/DL (ref 0.1–1)
BUN SERPL-MCNC: 17 MG/DL (ref 8–23)
BUN SERPL-MCNC: 50 MG/DL (ref 8–23)
CALCIUM SERPL-MCNC: 9.1 MG/DL (ref 8.7–10.5)
CALCIUM SERPL-MCNC: 9.2 MG/DL (ref 8.7–10.5)
CHLORIDE SERPL-SCNC: 102 MMOL/L (ref 95–110)
CHLORIDE SERPL-SCNC: 93 MMOL/L (ref 95–110)
CO2 SERPL-SCNC: 21 MMOL/L (ref 23–29)
CO2 SERPL-SCNC: 25 MMOL/L (ref 23–29)
CREAT SERPL-MCNC: 3.2 MG/DL (ref 0.5–1.4)
CREAT SERPL-MCNC: 6.5 MG/DL (ref 0.5–1.4)
DIFFERENTIAL METHOD BLD: ABNORMAL
EOSINOPHIL # BLD AUTO: 0 K/UL (ref 0–0.5)
EOSINOPHIL NFR BLD: 0.1 % (ref 0–8)
ERYTHROCYTE [DISTWIDTH] IN BLOOD BY AUTOMATED COUNT: 15.3 % (ref 11.5–14.5)
EST. GFR  (NO RACE VARIABLE): 14.7 ML/MIN/1.73 M^2
EST. GFR  (NO RACE VARIABLE): 6.3 ML/MIN/1.73 M^2
GLUCOSE SERPL-MCNC: 103 MG/DL (ref 70–110)
GLUCOSE SERPL-MCNC: 155 MG/DL (ref 70–110)
HCT VFR BLD AUTO: 27.4 % (ref 37–48.5)
HGB BLD-MCNC: 9.1 G/DL (ref 12–16)
IMM GRANULOCYTES # BLD AUTO: 0.04 K/UL (ref 0–0.04)
IMM GRANULOCYTES NFR BLD AUTO: 0.4 % (ref 0–0.5)
LACTATE SERPL-SCNC: 1.6 MMOL/L (ref 0.5–2.2)
LYMPHOCYTES # BLD AUTO: 0.4 K/UL (ref 1–4.8)
LYMPHOCYTES NFR BLD: 3.6 % (ref 18–48)
MAGNESIUM SERPL-MCNC: 1.9 MG/DL (ref 1.6–2.6)
MCH RBC QN AUTO: 30.7 PG (ref 27–31)
MCHC RBC AUTO-ENTMCNC: 33.2 G/DL (ref 32–36)
MCV RBC AUTO: 93 FL (ref 82–98)
MONOCYTES # BLD AUTO: 1.4 K/UL (ref 0.3–1)
MONOCYTES NFR BLD: 12.7 % (ref 4–15)
NEUTROPHILS # BLD AUTO: 9 K/UL (ref 1.8–7.7)
NEUTROPHILS NFR BLD: 83 % (ref 38–73)
NRBC BLD-RTO: 0 /100 WBC
PHOSPHATE SERPL-MCNC: 2.9 MG/DL (ref 2.7–4.5)
PHOSPHATE SERPL-MCNC: 4.5 MG/DL (ref 2.7–4.5)
PLATELET # BLD AUTO: 167 K/UL (ref 150–450)
PMV BLD AUTO: 10.7 FL (ref 9.2–12.9)
POCT GLUCOSE: 108 MG/DL (ref 70–110)
POCT GLUCOSE: 113 MG/DL (ref 70–110)
POCT GLUCOSE: 171 MG/DL (ref 70–110)
POCT GLUCOSE: 185 MG/DL (ref 70–110)
POCT GLUCOSE: 202 MG/DL (ref 70–110)
POTASSIUM SERPL-SCNC: 4.4 MMOL/L (ref 3.5–5.1)
POTASSIUM SERPL-SCNC: 4.9 MMOL/L (ref 3.5–5.1)
PROT SERPL-MCNC: 7.1 G/DL (ref 6–8.4)
RBC # BLD AUTO: 2.96 M/UL (ref 4–5.4)
SODIUM SERPL-SCNC: 132 MMOL/L (ref 136–145)
SODIUM SERPL-SCNC: 137 MMOL/L (ref 136–145)
TROPONIN I SERPL DL<=0.01 NG/ML-MCNC: 0.11 NG/ML (ref 0–0.03)
TROPONIN I SERPL DL<=0.01 NG/ML-MCNC: 0.12 NG/ML (ref 0–0.03)
WBC # BLD AUTO: 10.88 K/UL (ref 3.9–12.7)

## 2024-01-05 PROCEDURE — 25000003 PHARM REV CODE 250: Performed by: STUDENT IN AN ORGANIZED HEALTH CARE EDUCATION/TRAINING PROGRAM

## 2024-01-05 PROCEDURE — 85025 COMPLETE CBC W/AUTO DIFF WBC: CPT | Performed by: STUDENT IN AN ORGANIZED HEALTH CARE EDUCATION/TRAINING PROGRAM

## 2024-01-05 PROCEDURE — 80069 RENAL FUNCTION PANEL: CPT | Performed by: SURGERY

## 2024-01-05 PROCEDURE — 93005 ELECTROCARDIOGRAM TRACING: CPT

## 2024-01-05 PROCEDURE — 63600175 PHARM REV CODE 636 W HCPCS: Performed by: INTERNAL MEDICINE

## 2024-01-05 PROCEDURE — 99222 1ST HOSP IP/OBS MODERATE 55: CPT | Mod: GC,,, | Performed by: INTERNAL MEDICINE

## 2024-01-05 PROCEDURE — 36415 COLL VENOUS BLD VENIPUNCTURE: CPT | Mod: XB | Performed by: SURGERY

## 2024-01-05 PROCEDURE — 63600175 PHARM REV CODE 636 W HCPCS: Performed by: PHYSICIAN ASSISTANT

## 2024-01-05 PROCEDURE — 25000003 PHARM REV CODE 250

## 2024-01-05 PROCEDURE — 25000003 PHARM REV CODE 250: Performed by: SURGERY

## 2024-01-05 PROCEDURE — 84484 ASSAY OF TROPONIN QUANT: CPT | Mod: 91

## 2024-01-05 PROCEDURE — 36620 INSERTION CATHETER ARTERY: CPT | Mod: ,,, | Performed by: ANESTHESIOLOGY

## 2024-01-05 PROCEDURE — 83605 ASSAY OF LACTIC ACID: CPT | Performed by: STUDENT IN AN ORGANIZED HEALTH CARE EDUCATION/TRAINING PROGRAM

## 2024-01-05 PROCEDURE — 99223 1ST HOSP IP/OBS HIGH 75: CPT | Mod: ,,, | Performed by: ANESTHESIOLOGY

## 2024-01-05 PROCEDURE — 63600175 PHARM REV CODE 636 W HCPCS

## 2024-01-05 PROCEDURE — 94761 N-INVAS EAR/PLS OXIMETRY MLT: CPT

## 2024-01-05 PROCEDURE — 84484 ASSAY OF TROPONIN QUANT: CPT | Performed by: STUDENT IN AN ORGANIZED HEALTH CARE EDUCATION/TRAINING PROGRAM

## 2024-01-05 PROCEDURE — 80053 COMPREHEN METABOLIC PANEL: CPT | Performed by: STUDENT IN AN ORGANIZED HEALTH CARE EDUCATION/TRAINING PROGRAM

## 2024-01-05 PROCEDURE — 90935 HEMODIALYSIS ONE EVALUATION: CPT | Mod: ,,, | Performed by: NURSE PRACTITIONER

## 2024-01-05 PROCEDURE — 25000003 PHARM REV CODE 250: Performed by: PHYSICIAN ASSISTANT

## 2024-01-05 PROCEDURE — 90935 HEMODIALYSIS ONE EVALUATION: CPT

## 2024-01-05 PROCEDURE — 93010 ELECTROCARDIOGRAM REPORT: CPT | Mod: 76,,, | Performed by: INTERNAL MEDICINE

## 2024-01-05 PROCEDURE — 83735 ASSAY OF MAGNESIUM: CPT | Performed by: STUDENT IN AN ORGANIZED HEALTH CARE EDUCATION/TRAINING PROGRAM

## 2024-01-05 PROCEDURE — 25000003 PHARM REV CODE 250: Performed by: NURSE PRACTITIONER

## 2024-01-05 PROCEDURE — 93010 ELECTROCARDIOGRAM REPORT: CPT | Mod: ,,, | Performed by: INTERNAL MEDICINE

## 2024-01-05 PROCEDURE — 20000000 HC ICU ROOM

## 2024-01-05 PROCEDURE — 36415 COLL VENOUS BLD VENIPUNCTURE: CPT | Performed by: STUDENT IN AN ORGANIZED HEALTH CARE EDUCATION/TRAINING PROGRAM

## 2024-01-05 PROCEDURE — 03HY32Z INSERTION OF MONITORING DEVICE INTO UPPER ARTERY, PERCUTANEOUS APPROACH: ICD-10-PCS | Performed by: ANESTHESIOLOGY

## 2024-01-05 PROCEDURE — 84100 ASSAY OF PHOSPHORUS: CPT | Performed by: STUDENT IN AN ORGANIZED HEALTH CARE EDUCATION/TRAINING PROGRAM

## 2024-01-05 PROCEDURE — 99900035 HC TECH TIME PER 15 MIN (STAT)

## 2024-01-05 RX ORDER — MIDODRINE HYDROCHLORIDE 5 MG/1
5 TABLET ORAL ONCE
Status: COMPLETED | OUTPATIENT
Start: 2024-01-05 | End: 2024-01-05

## 2024-01-05 RX ORDER — METOPROLOL TARTRATE 1 MG/ML
5 INJECTION, SOLUTION INTRAVENOUS EVERY 5 MIN PRN
Status: DISCONTINUED | OUTPATIENT
Start: 2024-01-05 | End: 2024-01-08

## 2024-01-05 RX ORDER — NOREPINEPHRINE BITARTRATE/D5W 4MG/250ML
0-3 PLASTIC BAG, INJECTION (ML) INTRAVENOUS CONTINUOUS
Status: DISCONTINUED | OUTPATIENT
Start: 2024-01-05 | End: 2024-01-07

## 2024-01-05 RX ORDER — MIDODRINE HYDROCHLORIDE 5 MG/1
5 TABLET ORAL
Status: DISCONTINUED | OUTPATIENT
Start: 2024-01-05 | End: 2024-01-05

## 2024-01-05 RX ORDER — NOREPINEPHRINE BITARTRATE/D5W 4MG/250ML
PLASTIC BAG, INJECTION (ML) INTRAVENOUS
Status: DISPENSED
Start: 2024-01-05 | End: 2024-01-06

## 2024-01-05 RX ORDER — MIDODRINE HYDROCHLORIDE 5 MG/1
5 TABLET ORAL
Status: DISCONTINUED | OUTPATIENT
Start: 2024-01-06 | End: 2024-01-05

## 2024-01-05 RX ORDER — MUPIROCIN 20 MG/G
OINTMENT TOPICAL 2 TIMES DAILY
Status: COMPLETED | OUTPATIENT
Start: 2024-01-05 | End: 2024-01-10

## 2024-01-05 RX ORDER — LIDOCAINE HYDROCHLORIDE 10 MG/ML
5 INJECTION, SOLUTION EPIDURAL; INFILTRATION; INTRACAUDAL; PERINEURAL ONCE
Status: COMPLETED | OUTPATIENT
Start: 2024-01-05 | End: 2024-01-05

## 2024-01-05 RX ORDER — MIDODRINE HYDROCHLORIDE 5 MG/1
10 TABLET ORAL
Status: DISCONTINUED | OUTPATIENT
Start: 2024-01-05 | End: 2024-01-08

## 2024-01-05 RX ORDER — METOPROLOL TARTRATE 25 MG/1
25 TABLET, FILM COATED ORAL 2 TIMES DAILY
Status: DISCONTINUED | OUTPATIENT
Start: 2024-01-05 | End: 2024-01-06

## 2024-01-05 RX ORDER — MIDODRINE HYDROCHLORIDE 5 MG/1
10 TABLET ORAL
Status: DISCONTINUED | OUTPATIENT
Start: 2024-01-06 | End: 2024-01-05

## 2024-01-05 RX ORDER — METOPROLOL TARTRATE 1 MG/ML
INJECTION, SOLUTION INTRAVENOUS
Status: COMPLETED
Start: 2024-01-05 | End: 2024-01-05

## 2024-01-05 RX ORDER — SODIUM CHLORIDE 9 MG/ML
INJECTION, SOLUTION INTRAVENOUS ONCE
Status: DISCONTINUED | OUTPATIENT
Start: 2024-01-06 | End: 2024-01-06

## 2024-01-05 RX ORDER — MIDODRINE HYDROCHLORIDE 5 MG/1
15 TABLET ORAL
Status: DISCONTINUED | OUTPATIENT
Start: 2024-01-06 | End: 2024-01-08

## 2024-01-05 RX ADMIN — METOROPROLOL TARTRATE 5 MG: 5 INJECTION, SOLUTION INTRAVENOUS at 01:01

## 2024-01-05 RX ADMIN — MIDODRINE HYDROCHLORIDE 5 MG: 5 TABLET ORAL at 08:01

## 2024-01-05 RX ADMIN — ACETAMINOPHEN 650 MG: 325 TABLET ORAL at 04:01

## 2024-01-05 RX ADMIN — INSULIN ASPART 1 UNITS: 100 INJECTION, SOLUTION INTRAVENOUS; SUBCUTANEOUS at 11:01

## 2024-01-05 RX ADMIN — PREGABALIN 75 MG: 75 CAPSULE ORAL at 04:01

## 2024-01-05 RX ADMIN — SEVELAMER CARBONATE 1600 MG: 800 TABLET, FILM COATED ORAL at 08:01

## 2024-01-05 RX ADMIN — MUPIROCIN: 20 OINTMENT TOPICAL at 08:01

## 2024-01-05 RX ADMIN — AMIODARONE HYDROCHLORIDE 1 MG/MIN: 1.8 INJECTION, SOLUTION INTRAVENOUS at 04:01

## 2024-01-05 RX ADMIN — HEPARIN SODIUM 5000 UNITS: 5000 INJECTION INTRAVENOUS; SUBCUTANEOUS at 09:01

## 2024-01-05 RX ADMIN — ACETAMINOPHEN 650 MG: 325 TABLET ORAL at 06:01

## 2024-01-05 RX ADMIN — LIDOCAINE 5% 1 PATCH: 700 PATCH TOPICAL at 08:01

## 2024-01-05 RX ADMIN — OXYCODONE HYDROCHLORIDE 5 MG: 5 TABLET ORAL at 05:01

## 2024-01-05 RX ADMIN — HEPARIN SODIUM 5000 UNITS: 5000 INJECTION INTRAVENOUS; SUBCUTANEOUS at 04:01

## 2024-01-05 RX ADMIN — AMIODARONE HYDROCHLORIDE 1 MG/MIN: 1.8 INJECTION, SOLUTION INTRAVENOUS at 09:01

## 2024-01-05 RX ADMIN — SODIUM CHLORIDE: 9 INJECTION, SOLUTION INTRAVENOUS at 09:01

## 2024-01-05 RX ADMIN — SODIUM CHLORIDE, POTASSIUM CHLORIDE, SODIUM LACTATE AND CALCIUM CHLORIDE 500 ML: 600; 310; 30; 20 INJECTION, SOLUTION INTRAVENOUS at 07:01

## 2024-01-05 RX ADMIN — SENNOSIDES AND DOCUSATE SODIUM 1 TABLET: 8.6; 5 TABLET ORAL at 08:01

## 2024-01-05 RX ADMIN — MIDODRINE HYDROCHLORIDE 5 MG: 5 TABLET ORAL at 11:01

## 2024-01-05 RX ADMIN — SODIUM CHLORIDE, POTASSIUM CHLORIDE, SODIUM LACTATE AND CALCIUM CHLORIDE 500 ML: 600; 310; 30; 20 INJECTION, SOLUTION INTRAVENOUS at 05:01

## 2024-01-05 RX ADMIN — MIDODRINE HYDROCHLORIDE 10 MG: 5 TABLET ORAL at 09:01

## 2024-01-05 RX ADMIN — HEPARIN SODIUM 5000 UNITS: 5000 INJECTION INTRAVENOUS; SUBCUTANEOUS at 06:01

## 2024-01-05 RX ADMIN — OXYCODONE HYDROCHLORIDE 5 MG: 5 TABLET ORAL at 11:01

## 2024-01-05 RX ADMIN — MIDODRINE HYDROCHLORIDE 5 MG: 5 TABLET ORAL at 09:01

## 2024-01-05 RX ADMIN — ACETAMINOPHEN 650 MG: 325 TABLET ORAL at 09:01

## 2024-01-05 RX ADMIN — AMIODARONE HYDROCHLORIDE 150 MG: 1.5 INJECTION, SOLUTION INTRAVENOUS at 04:01

## 2024-01-05 RX ADMIN — METHOCARBAMOL 500 MG: 500 TABLET ORAL at 08:01

## 2024-01-05 RX ADMIN — SEVELAMER CARBONATE 1600 MG: 800 TABLET, FILM COATED ORAL at 04:01

## 2024-01-05 RX ADMIN — LIDOCAINE HYDROCHLORIDE 50 MG: 10 INJECTION, SOLUTION EPIDURAL; INFILTRATION; INTRACAUDAL at 11:01

## 2024-01-05 RX ADMIN — ATORVASTATIN CALCIUM 40 MG: 40 TABLET, FILM COATED ORAL at 08:01

## 2024-01-05 NOTE — PROGRESS NOTES
01/05/24 1200   Post-Hemodialysis Assessment   Rinseback Volume (mL) 250 mL   Blood Volume Processed (Liters) 55 L   Dialyzer Clearance Lightly streaked   Duration of Treatment 180 minutes   Additional Fluid Intake (mL) 1000 mL   Total UF (mL) 1990 mL   Net Fluid Removal 740   Patient Response to Treatment tachycardic   Post-Hemodialysis Comments see notes     HD TX end 5 mins remain in TX time due to tachycardia, rinse back complete. Net removal 740 ml.

## 2024-01-05 NOTE — PROGRESS NOTES
[facility] STAFF HEMODIALYSIS NOTE     Patient currently on hemodialysis for removal of uremic toxins and volume.     Patient seen and evaluated on hemodialysis, tolerating treatment, see HD flowsheet for vitals and assessments.    Labs have been reviewed and the dialysate bath has been adjusted.       Assessment/Plan:    -POD #3 R. Thoracotomy, lymphadenectomy   -Patient seen on HD, tolerating treatment well, w/o complaints   -UF goal of 1-2L  -midodrine with HD  -Renal diet, if not NPO   -Strict I/O's and daily weights  -Daily renal function panels  -Keep MAP >65 while on HD   -Hgb goal 10-11, at goal   -continue sevelamer 1600 TIDWM  -continue toresmide   -Will continue to follow while inpatient     Christy Gaytan DNP-FNP, C  Nephrology  Pager: [phone]

## 2024-01-05 NOTE — PROGRESS NOTES
Patient arrived in a wheelchair to dialysis unit.   Report received from primary care nurse   VS's per dialysis Flowsheet.     Hemodialysis initiated using the following: using aseptic technique      Dialysis Access: RIJ     Will Maintain telemetry and blood pressure monitoring throughout treatment.  Refer to dialysis flowsheet and MAR for details.

## 2024-01-05 NOTE — PLAN OF CARE
Problem: Adult Inpatient Plan of Care  Goal: Plan of Care Review  Outcome: Ongoing, Progressing  Goal: Patient-Specific Goal (Individualized)  Outcome: Ongoing, Progressing  Goal: Absence of Hospital-Acquired Illness or Injury  Outcome: Ongoing, Progressing  Goal: Optimal Comfort and Wellbeing  Outcome: Ongoing, Progressing  Goal: Readiness for Transition of Care  Outcome: Ongoing, Progressing     Problem: Diabetes Comorbidity  Goal: Blood Glucose Level Within Targeted Range  Outcome: Ongoing, Progressing     Problem: Infection  Goal: Absence of Infection Signs and Symptoms  Outcome: Ongoing, Progressing     Problem: Device-Related Complication Risk (Hemodialysis)  Goal: Safe, Effective Therapy Delivery  Outcome: Ongoing, Progressing     Problem: Hemodynamic Instability (Hemodialysis)  Goal: Effective Tissue Perfusion  Outcome: Ongoing, Progressing     Problem: Infection (Hemodialysis)  Goal: Absence of Infection Signs and Symptoms  Outcome: Ongoing, Progressing     Problem: Fall Injury Risk  Goal: Absence of Fall and Fall-Related Injury  Outcome: Ongoing, Progressing

## 2024-01-05 NOTE — PLAN OF CARE
Problem: Infection (Hemodialysis)  Goal: Absence of Infection Signs and Symptoms  Outcome: Ongoing, Progressing

## 2024-01-05 NOTE — SUBJECTIVE & OBJECTIVE
Past Medical History:   Diagnosis Date    Anxiety     Celiac disease 2018    Celiac disease     Depression     Diabetes mellitus     Family history of breast cancer in mother      at age 68    Hyperlipidemia     Hypertension     Meniere disease     Meniere's disease, unspecified ear     Menopause     Peptic ulcer     Reflux esophagitis     Urinary tract infection     Vaginal infection     Vaginal Pap smear 2014    Pap/Hpv Negative        Past Surgical History:   Procedure Laterality Date    APPENDECTOMY      BREAST SURGERY      Tags    CARPAL TUNNEL RELEASE Bilateral 2017    ,     CLOSURE, COLOSTOMY Left 2023    Procedure: CLOSURE, COLOSTOMY;  Surgeon: Manuel Javier MD;  Location: [facility] OR;  Service: General;  Laterality: Left;    COLONOSCOPY  2018    Normal  ( Juan A)     COLOSTOMY Right 2023    Procedure: CREATION, COLOSTOMY;  Surgeon: Manuel Javier MD;  Location: [facility] OR;  Service: General;  Laterality: Right;    DILATION AND CURETTAGE OF UTERUS  1986    DUPUYTREN CONTRACTURE RELEASE Bilateral 2017    HYSTERECTOMY      BEAU w/ appy, no BSO     INJECTION OF NEUROLYTIC AGENT AROUND LUMBAR SYMPATHETIC NERVE N/A 2022    Procedure: BLOCK, LUMBAR SYMPATHETIC;  Surgeon: Souleymane Rizo Jr., MD;  Location: [facility] PAIN MGT;  Service: Pain Management;  Laterality: N/A;  no pacemaker   pt is diabetic     INJECTION OF NEUROLYTIC AGENT AROUND LUMBAR SYMPATHETIC NERVE N/A 2022    Procedure: BLOCK, LUMBAR SYMPATHETIC;  Surgeon: Souleymane Rizo Jr., MD;  Location: [facility] PAIN MGT;  Service: Pain Management;  Laterality: N/A;  diabetic     INSERTION OF TUNNELED CENTRAL VENOUS HEMODIALYSIS CATHETER Right 2023    Procedure: INSERTION, CATHETER, HEMODIALYSIS, DUAL LUMEN;  Surgeon: Manuel Javier MD;  Location: [facility] OR;  Service: General;  Laterality: Right;    INSERTION, CATHETER, TUNNELED Left 2023    Procedure: Insertion,catheter,tunneled;  Surgeon: Watson  CHANTAL Fontenot MD;  Location: [facility] OR;  Service: General;  Laterality: Left;    LAPAROTOMY, EXPLORATORY N/A 1/14/2023    Procedure: LAPAROTOMY, EXPLORATORY;  Surgeon: Manuel Javier MD;  Location: [facility] OR;  Service: General;  Laterality: N/A;    LAPAROTOMY, EXPLORATORY N/A 1/16/2023    Procedure: LAPAROTOMY, EXPLORATORY;  Surgeon: Manuel Javier MD;  Location: [facility] OR;  Service: General;  Laterality: N/A;    LYMPHADENECTOMY Right 1/2/2024    Procedure: LYMPHADENECTOMY;  Surgeon: Tan Thomson MD;  Location: [facility] OR 2ND FLR;  Service: Cardiothoracic;  Laterality: Right;    REMOVAL OF CATHETER Right 1/28/2023    Procedure: REMOVAL, CATHETER;  Surgeon: Watson Fontenot MD;  Location: [facility] OR;  Service: General;  Laterality: Right;    REMOVAL, TUNNELED CATH Right 1/25/2023    Procedure: REMOVAL, TUNNELED CATH;  Surgeon: Manuel Javier MD;  Location: [facility] OR;  Service: General;  Laterality: Right;    ROBOTIC BRONCHOSCOPY N/A 10/20/2023    Procedure: ROBOTIC BRONCHOSCOPY;  Surgeon: Mayda Monzon MD;  Location: [facility] OR 2ND FLR;  Service: Pulmonary;  Laterality: N/A;    SHOULDER SURGERY  2009 & 2010    right rotator cuff    THORACOTOMY Right 1/2/2024    Procedure: THORACOTOMY;  Surgeon: Tan Thomson MD;  Location: [facility] OR 2ND FLR;  Service: Cardiothoracic;  Laterality: Right;    THORACOTOMY, WITH INITIAL THERAPEUTIC WEDGE RESECTION OF LUNG Right 1/2/2024    Procedure: THORACOTOMY, WITH INITIAL THERAPEUTIC WEDGE RESECTION OF LUNG;  Surgeon: Tan Thomson MD;  Location: [facility] OR 2ND FLR;  Service: Cardiothoracic;  Laterality: Right;    TONSILLECTOMY      UPPER GASTROINTESTINAL ENDOSCOPY  04/2018       Review of patient's allergies indicates:   Allergen Reactions    Crestor [rosuvastatin] Swelling    Gluten protein        No current facility-administered medications on file prior to encounter.     Current Outpatient Medications on File Prior to Encounter   Medication Sig    atorvastatin (LIPITOR) 40 MG  tablet Take 1 tablet (40 mg total) by mouth once daily. (Patient taking differently: Take 40 mg by mouth every evening.)    epoetin noble-epbx (RETACRIT) 10,000 unit/mL imjection Inject 1 mL (10,000 Units total) into the skin every Mon, Wed, Fri. HOLD IF HEMOGLOBIN is 10 or GREATER    DO NOT SHAKE  DO NOT DILUTE  DO NOT MIX with other drug solutions    ferrous gluconate (FERGON) 324 MG tablet Take 1 tablet (324 mg total) by mouth daily with breakfast.    meclizine (ANTIVERT) 25 mg tablet Take 1 tablet (25 mg total) by mouth 3 (three) times daily as needed for Dizziness.    melatonin (MELATIN) 3 mg tablet Take 3 tablets (9 mg total) by mouth nightly as needed for Insomnia.    pantoprazole (PROTONIX) 40 MG tablet Take 1 tablet (40 mg total) by mouth once daily.    patiromer calcium sorbitex (VELTASSA) 8.4 gram PwPk Take 2 packets (16.8 g total) by mouth once daily.    sevelamer HCL (RENAGEL) 800 MG Tab Take 2 tablets (1,600 mg total) by mouth 3 (three) times daily with meals.    B complex-vitamin C-folic acid (MELLISSA-BENJY) 0.8 mg Tab Take 1 tablet (0.8 mg total) by mouth once daily.    betahistine HCl (BETAHISTINE, BULK, MISC)     blood sugar diagnostic (TRUE METRIX GLUCOSE TEST STRIP) Strp USE ONE STRIP FOR TESTING 2 TIMES A DAY    blood-glucose meter kit Use to test twice a day    calcium-vitamin D3 (OS-CIPRIANO 500 + D3) 500 mg-5 mcg (200 unit) per tablet Take 1 tablet by mouth once daily.    coenzyme Q10 100 mg capsule     dicyclomine (BENTYL) 10 MG capsule     dronabinoL (MARINOL) 5 MG capsule Take 1 capsule (5 mg total) by mouth 2 (two) times daily before meals.    econazole nitrate 1 % cream Apply topically 2 (two) times daily.    insulin aspart U-100 (NOVOLOG) 100 unit/mL (3 mL) InPn pen Inject before meals:  150-200=+1, 201-250=+2, 251-300=+3; 301-350=+4, over 350=+5 units    lancets Misc 1 lancet by Misc.(Non-Drug; Combo Route) route 4 (four) times daily.    mometasone (ELOCON) 0.1 % ointment Apply topically.     "neomycin (MYCIFRADIN) 500 mg Tab     nutritional supplements Liqd Take 237 mLs by mouth 4 (four) times daily.    oxyCODONE (ROXICODONE) 5 MG immediate release tablet Take 5 mg by mouth every 4 (four) hours as needed.    pen needle, diabetic (BD ULTRA-FINE MARIS PEN NEEDLE) 32 gauge x 5/32" Ndle 1 Device by Misc.(Non-Drug; Combo Route) route 4 (four) times daily with meals and nightly.    pregabalin (LYRICA) 150 MG capsule TAKE ONE CAPSULE BY MOUTH 2 TIMES A DAY    pregabalin (LYRICA) 75 MG capsule Take 1 capsule (75 mg total) by mouth Daily. Give after HD    simethicone (MYLICON) 125 mg Cap capsule Take by mouth.    vit C,M-Qz-ltljx-lutein-zeaxan (PRESERVISION AREDS 2) 705-849-27-1 mg-unit-mg-mg Cap     vitamins  A,C,E-zinc-copper 14,320-226-200 unit-mg-unit Cap Take 1 tablet by mouth once daily.     Family History       Problem Relation (Age of Onset)    Arthritis Father    Breast cancer Mother, Paternal Aunt    Cancer Mother, Paternal Aunt    Diabetes Paternal Grandfather    Hyperlipidemia Sister    Vision loss Father, Mother, Sister          Tobacco Use    Smoking status: Never    Smokeless tobacco: Never   Substance and Sexual Activity    Alcohol use: No    Drug use: Never    Sexual activity: Not Currently     Partners: Male     Birth control/protection: See Surgical Hx     Comment: :      Review of Systems   All other systems reviewed and are negative.    Objective:     Vital Signs (Most Recent):  Temp: 99.7 °F (37.6 °C) (01/05/24 1425)  Pulse: (!) 125 (01/05/24 1600)  Resp: (!) 25 (01/05/24 1600)  BP: (!) 98/51 (01/05/24 1600)  SpO2: 100 % (01/05/24 1600) Vital Signs (24h Range):  Temp:  [98.5 °F (36.9 °C)-99.7 °F (37.6 °C)] 99.7 °F (37.6 °C)  Pulse:  [] 125  Resp:  [17-33] 25  SpO2:  [91 %-100 %] 100 %  BP: ()/(50-63) 98/51     Weight: 64.6 kg (142 lb 6.7 oz)  Body mass index is 23.7 kg/m².    SpO2: 100 %         Intake/Output Summary (Last 24 hours) at 1/5/2024 1610  Last data filed at " 1/5/2024 1200  Gross per 24 hour   Intake 1080 ml   Output 2225 ml   Net -1145 ml       Lines/Drains/Airways       Central Venous Catheter Line  Duration                  Hemodialysis Catheter 01/25/23 1501 right internal jugular 345 days              Drain  Duration                  Ileostomy 01/16/23 1230  days         Chest Tube 01/02/24 1130 Tube - 1 Right Pleural 28 Fr. 3 days              Arterial Line  Duration             Arterial Line 01/02/24 0801 3 days              Peripheral Intravenous Line  Duration                  Hemodialysis AV Fistula 09/01/23 0800 Left forearm 126 days         Peripheral IV - Single Lumen 01/04/24 1821 Right;Lateral Wrist <1 day         Peripheral IV - Single Lumen 01/05/24 1546 20 G;1 3/4 in Right Forearm <1 day                     Physical Exam  Constitutional:       General: She is not in acute distress.     Appearance: Normal appearance.   HENT:      Mouth/Throat:      Mouth: Mucous membranes are moist.   Eyes:      Extraocular Movements: Extraocular movements intact.   Cardiovascular:      Rate and Rhythm: Tachycardia present. Rhythm irregular.      Pulses: Normal pulses.      Comments: Chest tube in place  Pulmonary:      Effort: Pulmonary effort is normal. No respiratory distress.      Comments: On NC  Abdominal:      Palpations: Abdomen is soft.   Musculoskeletal:      Cervical back: Neck supple.      Right lower leg: No edema.      Left lower leg: No edema.   Skin:     General: Skin is warm.   Neurological:      Mental Status: She is alert and oriented to person, place, and time.          Significant Labs: BMP:   Recent Labs   Lab 01/04/24 0315 01/05/24  0508 01/05/24  1345   * 103 155*   * 132* 137   K 5.1 4.9 4.4   CL 96 93* 102   CO2 23 25 21*   BUN 35* 50* 17   CREATININE 4.4* 6.5* 3.2*   CALCIUM 9.4 9.2 9.1   MG  --   --  1.9   , CMP   Recent Labs   Lab 01/04/24  0315 01/05/24  0508 01/05/24  1345   * 132* 137   K 5.1 4.9 4.4   CL 96 93*  "102   CO2 23 25 21*   * 103 155*   BUN 35* 50* 17   CREATININE 4.4* 6.5* 3.2*   CALCIUM 9.4 9.2 9.1   PROT  --   --  7.1   ALBUMIN 3.2* 2.8* 3.0*   BILITOT  --   --  1.1*   ALKPHOS  --   --  95   AST  --   --  23   ALT  --   --  <5*   ANIONGAP 14 14 14   , CBC   Recent Labs   Lab 01/05/24  1345   WBC 10.88   HGB 9.1*   HCT 27.4*      , INR No results for input(s): "INR", "PROTIME" in the last 48 hours., Lipid Panel No results for input(s): "CHOL", "HDL", "LDLCALC", "TRIG", "CHOLHDL" in the last 48 hours., and Troponin   Recent Labs   Lab 01/05/24  1345   TROPONINI 0.108*       Significant Imaging:   TTE 1/23  Concentric hypertrophy and  Normal right ventricular size with normal right ventricular systolic function.  The estimated ejection fraction is 35%.  Grade I left ventricular diastolic dysfunction.  Mild aortic regurgitation.  Mild mitral regurgitation.  Mild tricuspid regurgitation.  There is no pulmonary hypertension.    SPECT 11/23    Normal myocardial perfusion scan. There is no evidence of myocardial ischemia or infarction.    The LVEF is not accurate due to poor software boundary tracking at rest  and poor software boundary tracking during stress. The visually estimated ejection fraction is normal at rest and normal during stress.    There is mild global hypokinesis at rest and stress.    LV cavity size is normal at rest and normal at stress.    The ECG portion of the study is negative for ischemia.    The patient reported no chest pain during the stress test.    There were no arrhythmias during stress.    There are no prior studies for comparison.  "

## 2024-01-05 NOTE — HPI
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 Tolerated surgery well with chest tube still in place. She was receiving dialysis this morning and on completion was noted to be tachycardic. BP stable and she was asymptomatic. EKG with Afib with RVR. No significant improvement with IV BB x2 and 500 mL IVF bolus, so decision made to escalate to SICU and cardiology consulted for Afib with RVR.     Of note, Hx of HFrEF with EF 35%. SPECT negative for ischemia 11/23. She doesn't follow with cardiology.    Seen at bedside with her . She has no symptoms. -150s on telemetry.

## 2024-01-05 NOTE — PROGRESS NOTES
Additional 250 ml NS bolus given per verbal order Dr. Turner, EKG stat ordered. Pt awake, alert oriented.

## 2024-01-05 NOTE — PT/OT/SLP PROGRESS
Physical Therapy      Patient Name:  Lily Green   MRN:  [id number]    Patient not seen today secondary to Dialysis (x 2 attempts). Will follow-up next scheduled treatment per PT POC.

## 2024-01-05 NOTE — CODE/ RAPID DOCUMENTATION
RAPID RESPONSE NURSE NOTE        Admit Date: 2024  LOS: 3  Code Status: Full Code   Date of Consult: 2024  : 1950  Age: 73 y.o.  Weight:   Wt Readings from Last 1 Encounters:   24 64.6 kg (142 lb 6.7 oz)     Sex: female  Race: White   Bed: 33348 CVICU/59129 CVICU A:   MRN: [id number]  Time Rapid Response Team page Received: 1322  Time Rapid Response Team at Bedside: 1323  Time Rapid Response Team left Bedside: 1338  Was the patient discharged from an ICU this admission? No   Was the patient discharged from a PACU within last 24 hours? No   Did the patient receive conscious sedation/general anesthesia in last 24 hours? No  Was the patient in the ED within the past 24 hours? No  Was the patient on NIPPV within the past 24 hours? No   Did this progress into an ARC or CPA: No  Attending Physician: Tan Thomson MD  Primary Service: Cardiothoracic Surgery       SITUATION    Notified by pager.  Reason for alert: Afib RVR  Called to evaluate the patient for Dysrythmia    BACKGROUND     Why is the patient in the hospital?: Malignant carcinoid tumor of bronchus    Patient has a past medical history of Anxiety, Celiac disease, Celiac disease, Depression, Diabetes mellitus, Family history of breast cancer in mother, Hyperlipidemia, Hypertension, Meniere disease, Meniere's disease, unspecified ear, Menopause, Peptic ulcer, Reflux esophagitis, Urinary tract infection, Vaginal infection, and Vaginal Pap smear.    Last Vitals:  Temp: 99.7 °F (37.6 °C) ( 1425)  Pulse: 151 ( 1425)  Resp: 33 ( 142)  BP: 118/60 ( 1425)  SpO2: 93 % ( 1425)    24 Hours Vitals Range:  Temp:  [98.5 °F (36.9 °C)-99.7 °F (37.6 °C)]   Pulse:  []   Resp:  [17-33]   BP: ()/(50-60)   SpO2:  [91 %-96 %]     Labs:  Recent Labs     24  0605 24  1345   WBC 9.06 10.88   HGB 11.5* 9.1*   HCT 35.2* 27.4*    167       Recent Labs     24  0315 24  0508 24  1345   *  "132* 137   K 5.1 4.9 4.4   CL 96 93* 102   CO2 23 25 21*   BUN 35* 50* 17   CREATININE 4.4* 6.5* 3.2*   * 103 155*   PHOS 4.7* 4.5 2.9   MG  --   --  1.9        No results for input(s): "PH", "PCO2", "PO2", "HCO3", "POCSATURATED", "BE" in the last 72 hours.     ASSESSMENT    Physical Exam  Constitutional:       Appearance: She is ill-appearing.   Cardiovascular:      Rate and Rhythm: Tachycardia present. Rhythm irregular.   Neurological:      Mental Status: She is alert.         INTERVENTIONS    The patient was seen for Cardiac problem. Staff concerns included Afib RVR. The following interventions were performed: CBC, CMP, Magnesium, EKG, continuous cardiac monitoring continued, continuous pulse ox monitoring continued, cardiology consult, and lactic.    RECOMMENDATIONS    We recommend: Lopressor PRN was given x2, HR down to 120's, BP stable    PROVIDER ESCALATION    Orders received and case discussed with Dr. Flores .    Primary team arrival time: 1322    Disposition: Remain in room 1042.    FOLLOW UP    Charge RNLa  updated on plan of care. Instructed to call the Rapid Response Nurse, Fuad Graves RN at [phone] for additional questions or concerns.        "

## 2024-01-05 NOTE — HPI
Patient is a 73 y.o. 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  Chest CT 10/18/23 revealed 1.2 x 1.1 cm right middle lobe nodule, previously 1.2 x 1.0 cm in July 2023. Underwent Robotic Bronchoscopy 10/20/23; path: RML nodule positive for well-differentiated neuroendocrine neoplasm, carcinoid tumor. PET/CT Cu64 11/17/23 showed known RML nodule with hypermetabolic activity.     Now POD 3 robotic-assisted surgery converted to right thoracotomy with therapeutic wedge resection and open mediastinal lymph node dissection. Stepped up to the unit after converting into afib RVR after dialysis today.  She was given 3 doses of metoprolol 5, without any response and her heart rate or rhythm.  The oxygen requirements increased to 5 L from room air.  She was stepped up to the unit for management of persistent AFib RVR.  On admission, her blood pressure is in the 120s.  She has not in acute distress.  Her heart rate approaches 130s, 140s.  She is still in AFib.    Current plans are to consult cardiology and stabilize her heart rate and rhythm.     PSH: breast biopsy with tagging, appendectomy, colostomy closure (01/16/23), BEAU, D&C of uterus, Ex Lap x2 (01/14/23 & 01/16/23), R rotator cuff repair x2 (2009/2010), tonsillectomy, UGI endoscopy (2018)  Meds: torsemide, pregabalin, dronabinol, sevelamer

## 2024-01-05 NOTE — ASSESSMENT & PLAN NOTE
Likely secondary to recent surgery and/or volume removal with HD  Hemodynamically stable  CHADSVASC 3    Start Amiodarone bolus + gtt  Start Lopressor 25 mg BID if BP will tolerate  If hypotensive despite Amiodarone gtt, would load with Digoxin 500 mcg x1, then 250 mcg x1 6 hrs later, then 250 mcg x1 6 hrs later (total 3 doses to add up to 1 mg)  Needs to start anticoagulation with Heparin gtt or Eliquis 5 mg BID; if needing to hold anticoagulation for procedures then ok to hold for now, but needs to be started in the next 24-36 hrs if able for CVA Ppx  Once HR controlled (goal <110 bpm), please order echocardiogram  Monitor on telemetry; K>4, Mg>2  Consider pulling less volume on HD as she looks euvolemic/hypovolemic

## 2024-01-05 NOTE — CONSULTS
[facility]  Cardiology  Consult Note    Patient Name: Lily Green  MRN: [id number]  Admission Date: 2024  Hospital Length of Stay: 3 days  Code Status: Full Code   Attending Provider: Tan Thomson MD   Consulting Provider: Davis Ortiz MD  Primary Care Physician: Pavel Calixto MD  Principal Problem:Malignant carcinoid tumor of bronchus    Patient information was obtained from patient and ER records.     Inpatient consult to Cardiology  Consult performed by: Davis Ortiz MD  Consult ordered by: Brooklyn Sapp MD  Reason for consult: AFib with RVR        Subjective:     Chief Complaint:  Tachycardia    HPI:   Mrs Green is a 72 yo F with ESRD (TTS), DM2, HLD, DM2, and carcinoid tumor of the right middle lobe s/p thoracotomy and resection with mediastinal lymph node dissection 2023. Tolerated surgery well with chest tube still in place. She was receiving dialysis this morning and on completion was noted to be tachycardic. BP stable and she was asymptomatic. EKG with Afib with RVR. No significant improvement with IV BB x2 and 500 mL IVF bolus, so decision made to escalate to SICU and cardiology consulted for Afib with RVR.     Of note, Hx of HFrEF with EF 35%. SPECT negative for ischemia . She doesn't follow with cardiology.    Seen at bedside with her . She has no symptoms. -150s on telemetry.     Past Medical History:   Diagnosis Date    Anxiety     Celiac disease 2018    Celiac disease     Depression     Diabetes mellitus     Family history of breast cancer in mother      at age 68    Hyperlipidemia     Hypertension     Meniere disease     Meniere's disease, unspecified ear     Menopause     Peptic ulcer     Reflux esophagitis     Urinary tract infection     Vaginal infection     Vaginal Pap smear 2014    Pap/Hpv Negative        Past Surgical History:   Procedure Laterality Date    APPENDECTOMY      BREAST SURGERY       Tags    CARPAL TUNNEL RELEASE Bilateral 09/2017    ,     CLOSURE, COLOSTOMY Left 1/16/2023    Procedure: CLOSURE, COLOSTOMY;  Surgeon: Manuel Javier MD;  Location: [facility] OR;  Service: General;  Laterality: Left;    COLONOSCOPY  04/2018    Normal  ([place])     COLOSTOMY Right 1/16/2023    Procedure: CREATION, COLOSTOMY;  Surgeon: Manuel Javier MD;  Location: [facility] OR;  Service: General;  Laterality: Right;    DILATION AND CURETTAGE OF UTERUS  1986    DUPUYTREN CONTRACTURE RELEASE Bilateral 09/2017    HYSTERECTOMY  1987    BEAU w/ appy, no BSO     INJECTION OF NEUROLYTIC AGENT AROUND LUMBAR SYMPATHETIC NERVE N/A 1/6/2022    Procedure: BLOCK, LUMBAR SYMPATHETIC;  Surgeon: Souleymane Rizo Jr., MD;  Location: [facility] PAIN MGT;  Service: Pain Management;  Laterality: N/A;  no pacemaker   pt is diabetic     INJECTION OF NEUROLYTIC AGENT AROUND LUMBAR SYMPATHETIC NERVE N/A 8/25/2022    Procedure: BLOCK, LUMBAR SYMPATHETIC;  Surgeon: Souleymane Rizo Jr., MD;  Location: [facility] PAIN MGT;  Service: Pain Management;  Laterality: N/A;  diabetic     INSERTION OF TUNNELED CENTRAL VENOUS HEMODIALYSIS CATHETER Right 1/25/2023    Procedure: INSERTION, CATHETER, HEMODIALYSIS, DUAL LUMEN;  Surgeon: Manuel Javier MD;  Location: [facility] OR;  Service: General;  Laterality: Right;    INSERTION, CATHETER, TUNNELED Left 1/28/2023    Procedure: Insertion,catheter,tunneled;  Surgeon: Watson Fontenot MD;  Location: [facility] OR;  Service: General;  Laterality: Left;    LAPAROTOMY, EXPLORATORY N/A 1/14/2023    Procedure: LAPAROTOMY, EXPLORATORY;  Surgeon: Manuel Javier MD;  Location: [facility] OR;  Service: General;  Laterality: N/A;    LAPAROTOMY, EXPLORATORY N/A 1/16/2023    Procedure: LAPAROTOMY, EXPLORATORY;  Surgeon: Manuel Javier MD;  Location: [facility] OR;  Service: General;  Laterality: N/A;    LYMPHADENECTOMY Right 1/2/2024    Procedure: LYMPHADENECTOMY;  Surgeon: Tan Thomson MD;  Location: [address];  Service:  Cardiothoracic;  Laterality: Right;    REMOVAL OF CATHETER Right 1/28/2023    Procedure: REMOVAL, CATHETER;  Surgeon: Watson Fontenot MD;  Location: [facility] OR;  Service: General;  Laterality: Right;    REMOVAL, TUNNELED CATH Right 1/25/2023    Procedure: REMOVAL, TUNNELED CATH;  Surgeon: Manuel Javier MD;  Location: [facility] OR;  Service: General;  Laterality: Right;    ROBOTIC BRONCHOSCOPY N/A 10/20/2023    Procedure: ROBOTIC BRONCHOSCOPY;  Surgeon: Mayda Monzon MD;  Location: [facility] OR [facility];  Service: Pulmonary;  Laterality: N/A;    SHOULDER SURGERY  2009 & 2010    right rotator cuff    THORACOTOMY Right 1/2/2024    Procedure: THORACOTOMY;  Surgeon: Tan Thomson MD;  Location: [facility] OR [facility];  Service: Cardiothoracic;  Laterality: Right;    THORACOTOMY, WITH INITIAL THERAPEUTIC WEDGE RESECTION OF LUNG Right 1/2/2024    Procedure: THORACOTOMY, WITH INITIAL THERAPEUTIC WEDGE RESECTION OF LUNG;  Surgeon: Tan Thomson MD;  [facility];  Service: Cardiothoracic;  Laterality: Right;    TONSILLECTOMY      UPPER GASTROINTESTINAL ENDOSCOPY  04/2018       Review of patient's allergies indicates:   Allergen Reactions    Crestor [rosuvastatin] Swelling    Gluten protein        No current facility-administered medications on file prior to encounter.     Current Outpatient Medications on File Prior to Encounter   Medication Sig    atorvastatin (LIPITOR) 40 MG tablet Take 1 tablet (40 mg total) by mouth once daily. (Patient taking differently: Take 40 mg by mouth every evening.)    epoetin noble-epbx (RETACRIT) 10,000 unit/mL imjection Inject 1 mL (10,000 Units total) into the skin every Mon, Wed, Fri. HOLD IF HEMOGLOBIN is 10 or GREATER    DO NOT SHAKE  DO NOT DILUTE  DO NOT MIX with other drug solutions    ferrous gluconate (FERGON) 324 MG tablet Take 1 tablet (324 mg total) by mouth daily with breakfast.    meclizine (ANTIVERT) 25 mg tablet Take 1 tablet (25 mg total) by mouth 3 (three) times  "daily as needed for Dizziness.    melatonin (MELATIN) 3 mg tablet Take 3 tablets (9 mg total) by mouth nightly as needed for Insomnia.    pantoprazole (PROTONIX) 40 MG tablet Take 1 tablet (40 mg total) by mouth once daily.    patiromer calcium sorbitex (VELTASSA) 8.4 gram PwPk Take 2 packets (16.8 g total) by mouth once daily.    sevelamer HCL (RENAGEL) 800 MG Tab Take 2 tablets (1,600 mg total) by mouth 3 (three) times daily with meals.    B complex-vitamin C-folic acid (MELLISSA-BENJY) 0.8 mg Tab Take 1 tablet (0.8 mg total) by mouth once daily.    betahistine HCl (BETAHISTINE, BULK, MISC)     blood sugar diagnostic (TRUE METRIX GLUCOSE TEST STRIP) Strp USE ONE STRIP FOR TESTING 2 TIMES A DAY    blood-glucose meter kit Use to test twice a day    calcium-vitamin D3 (OS-CIPRIANO 500 + D3) 500 mg-5 mcg (200 unit) per tablet Take 1 tablet by mouth once daily.    coenzyme Q10 100 mg capsule     dicyclomine (BENTYL) 10 MG capsule     dronabinoL (MARINOL) 5 MG capsule Take 1 capsule (5 mg total) by mouth 2 (two) times daily before meals.    econazole nitrate 1 % cream Apply topically 2 (two) times daily.    insulin aspart U-100 (NOVOLOG) 100 unit/mL (3 mL) InPn pen Inject before meals:  150-200=+1, 201-250=+2, 251-300=+3; 301-350=+4, over 350=+5 units    lancets Misc 1 lancet by Misc.(Non-Drug; Combo Route) route 4 (four) times daily.    mometasone (ELOCON) 0.1 % ointment Apply topically.    neomycin (MYCIFRADIN) 500 mg Tab     nutritional supplements Liqd Take 237 mLs by mouth 4 (four) times daily.    oxyCODONE (ROXICODONE) 5 MG immediate release tablet Take 5 mg by mouth every 4 (four) hours as needed.    pen needle, diabetic (BD ULTRA-FINE MARIS PEN NEEDLE) 32 gauge x 5/32" Ndle 1 Device by Misc.(Non-Drug; Combo Route) route 4 (four) times daily with meals and nightly.    pregabalin (LYRICA) 150 MG capsule TAKE ONE CAPSULE BY MOUTH 2 TIMES A DAY    pregabalin (LYRICA) 75 MG capsule Take 1 capsule (75 mg total) by mouth Daily. " Give after HD    simethicone (MYLICON) 125 mg Cap capsule Take by mouth.    vit C,N-Gq-jnqfv-lutein-zeaxan (PRESERVISION AREDS 2) 831-780-79-1 mg-unit-mg-mg Cap     vitamins  A,C,E-zinc-copper 14,320-226-200 unit-mg-unit Cap Take 1 tablet by mouth once daily.     Family History       Problem Relation (Age of Onset)    Arthritis Father    Breast cancer Mother, Paternal Aunt    Cancer Mother, Paternal Aunt    Diabetes Paternal Grandfather    Hyperlipidemia Sister    Vision loss Father, Mother, Sister          Tobacco Use    Smoking status: Never    Smokeless tobacco: Never   Substance and Sexual Activity    Alcohol use: No    Drug use: Never    Sexual activity: Not Currently     Partners: Male     Birth control/protection: See Surgical Hx     Comment: :      Review of Systems   All other systems reviewed and are negative.    Objective:     Vital Signs (Most Recent):  Temp: 99.7 °F (37.6 °C) (01/05/24 1425)  Pulse: (!) 125 (01/05/24 1600)  Resp: (!) 25 (01/05/24 1600)  BP: (!) 98/51 (01/05/24 1600)  SpO2: 100 % (01/05/24 1600) Vital Signs (24h Range):  Temp:  [98.5 °F (36.9 °C)-99.7 °F (37.6 °C)] 99.7 °F (37.6 °C)  Pulse:  [] 125  Resp:  [17-33] 25  SpO2:  [91 %-100 %] 100 %  BP: ()/(50-63) 98/51     Weight: 64.6 kg (142 lb 6.7 oz)  Body mass index is 23.7 kg/m².    SpO2: 100 %         Intake/Output Summary (Last 24 hours) at 1/5/2024 1610  Last data filed at 1/5/2024 1200  Gross per 24 hour   Intake 1080 ml   Output 2225 ml   Net -1145 ml       Lines/Drains/Airways       Central Venous Catheter Line  Duration                  Hemodialysis Catheter 01/25/23 1501 right internal jugular 345 days              Drain  Duration                  Ileostomy 01/16/23 1230  days         Chest Tube 01/02/24 1130 Tube - 1 Right Pleural 28 Fr. 3 days              Arterial Line  Duration             Arterial Line 01/02/24 0801 3 days              Peripheral Intravenous Line  Duration                   "Hemodialysis AV Fistula 09/01/23 0800 Left forearm 126 days         Peripheral IV - Single Lumen 01/04/24 1821 Right;Lateral Wrist <1 day         Peripheral IV - Single Lumen 01/05/24 1546 20 G;1 3/4 in Right Forearm <1 day                     Physical Exam  Constitutional:       General: She is not in acute distress.     Appearance: Normal appearance.   HENT:      Mouth/Throat:      Mouth: Mucous membranes are moist.   Eyes:      Extraocular Movements: Extraocular movements intact.   Cardiovascular:      Rate and Rhythm: Tachycardia present. Rhythm irregular.      Pulses: Normal pulses.      Comments: Chest tube in place  Pulmonary:      Effort: Pulmonary effort is normal. No respiratory distress.      Comments: On NC  Abdominal:      Palpations: Abdomen is soft.   Musculoskeletal:      Cervical back: Neck supple.      Right lower leg: No edema.      Left lower leg: No edema.   Skin:     General: Skin is warm.   Neurological:      Mental Status: She is alert and oriented to person, place, and time.          Significant Labs: BMP:   Recent Labs   Lab 01/04/24  0315 01/05/24  0508 01/05/24  1345   * 103 155*   * 132* 137   K 5.1 4.9 4.4   CL 96 93* 102   CO2 23 25 21*   BUN 35* 50* 17   CREATININE 4.4* 6.5* 3.2*   CALCIUM 9.4 9.2 9.1   MG  --   --  1.9   , CMP   Recent Labs   Lab 01/04/24 0315 01/05/24  0508 01/05/24  1345   * 132* 137   K 5.1 4.9 4.4   CL 96 93* 102   CO2 23 25 21*   * 103 155*   BUN 35* 50* 17   CREATININE 4.4* 6.5* 3.2*   CALCIUM 9.4 9.2 9.1   PROT  --   --  7.1   ALBUMIN 3.2* 2.8* 3.0*   BILITOT  --   --  1.1*   ALKPHOS  --   --  95   AST  --   --  23   ALT  --   --  <5*   ANIONGAP 14 14 14   , CBC   Recent Labs   Lab 01/05/24  1345   WBC 10.88   HGB 9.1*   HCT 27.4*      , INR No results for input(s): "INR", "PROTIME" in the last 48 hours., Lipid Panel No results for input(s): "CHOL", "HDL", "LDLCALC", "TRIG", "CHOLHDL" in the last 48 hours., and Troponin "   Recent Labs   Lab 01/05/24  1345   TROPONINI 0.108*       Significant Imaging:   TTE 1/23  Concentric hypertrophy and  Normal right ventricular size with normal right ventricular systolic function.  The estimated ejection fraction is 35%.  Grade I left ventricular diastolic dysfunction.  Mild aortic regurgitation.  Mild mitral regurgitation.  Mild tricuspid regurgitation.  There is no pulmonary hypertension.    SPECT 11/23    Normal myocardial perfusion scan. There is no evidence of myocardial ischemia or infarction.    The LVEF is not accurate due to poor software boundary tracking at rest  and poor software boundary tracking during stress. The visually estimated ejection fraction is normal at rest and normal during stress.    There is mild global hypokinesis at rest and stress.    LV cavity size is normal at rest and normal at stress.    The ECG portion of the study is negative for ischemia.    The patient reported no chest pain during the stress test.    There were no arrhythmias during stress.    There are no prior studies for comparison.  Assessment and Plan:     Atrial fibrillation with rapid ventricular response  Likely secondary to recent surgery and/or volume removal with HD  Hemodynamically stable  CHADSVASC 3    Start Amiodarone bolus + gtt  Start Lopressor 25 mg BID if BP will tolerate  If hypotensive despite Amiodarone gtt, would load with Digoxin 500 mcg x1, then 250 mcg x1 6 hrs later, then 250 mcg x1 6 hrs later (total 3 doses to add up to 1 mg)  Needs to start anticoagulation with Heparin gtt or Eliquis 5 mg BID; if needing to hold anticoagulation for procedures then ok to hold for now, but needs to be started in the next 24-36 hrs if able for CVA Ppx  Once HR controlled (goal <110 bpm), please order echocardiogram  Monitor on telemetry; K>4, Mg>2  Consider pulling less volume on HD as she looks euvolemic/hypovolemic        VTE Risk Mitigation (From admission, onward)           Ordered     heparin  (porcine) injection 1,000 Units  As needed (PRN)         01/03/24 1123     heparin (porcine) injection 5,000 Units  Every 8 hours         01/02/24 1226     IP VTE HIGH RISK PATIENT  Once         01/02/24 1226     Place CARMELITA hose  Until discontinued         01/02/24 1226     Place sequential compression device  Until discontinued         01/02/24 1226                  Case discussed with Dr Darren Ozuna MD.    Thank you for your consult. I will follow-up with patient. Please contact us if you have any additional questions.    Davis Ortiz MD  Cardiology   [facility]

## 2024-01-05 NOTE — CARE UPDATE
-Glucose Goal 140-180    -A1C:   Hemoglobin A1C   Date Value Ref Range Status   11/15/2023 4.9 4.0 - 5.6 % Final     Comment:     ADA Screening Guidelines:  5.7-6.4%  Consistent with prediabetes  >or=6.5%  Consistent with diabetes    High levels of fetal hemoglobin interfere with the HbA1C  assay. Heterozygous hemoglobin variants (HbS, HgC, etc)do  not significantly interfere with this assay.   However, presence of multiple variants may affect accuracy.     10/23/2019 6.8 % Final         -HOME REGIMEN:   LDC SSI   Blood sugar 150 to 200, + 1 unit  Blood sugar 201 to 250, + 2 units  Blood sugar 251 to 300, + 3 units  Blood sugar 301 to 350, + 4 units   Blood sugar greater than 350, + 5 units      -GLUCOSE TREND FOR THE PAST 24HRS:   Recent Labs   Lab 01/04/24  1117 01/04/24  1644 01/04/24  1933 01/04/24  2338 01/05/24  0502 01/05/24  0724   POCTGLUCOSE 194* 193* 131* 143* 113* 108           -NO HYPOGYCEMIAS NOTED     - Diet  Diet diabetic Renal, Gluten Free, Soft & Bite Sized (IDDSI Level 6); 2000 Calorie    -TOLERATING 50 % OF PO DIET     -NPO? No     -Steroids - none     -Tube Feeds - n/a         Plan:   - Novolog Low dose correction with ISF 50 starting at 150 prn ac/hs   - POCT Glucose before meals and at bedtime  - Hypoglycemia protocol in place      ** Please notify Endocrine for any change and/or advance in diet**  ** Please call Endocrine for any BG related issues **     Discharge Planning:   TBD. Please notify endocrinology prior to discharge.

## 2024-01-05 NOTE — PLAN OF CARE
[facility] Plan of Care Note  Dx Final diagnoses:  [D3A.090] Carcinoid tumor of lung (Primary)        Shift Events: chest tube dressing changed       Neuro: A&O4     Vital Signs: VSS     Respiratory: RA     Diet: clears     Urine Output/Bowel Movement: no bm, pt on HD     Drains: RCT to CLWS     Activity level? X1      Any scheduled procedures? no     Any safety concerns? no

## 2024-01-05 NOTE — ASSESSMENT & PLAN NOTE
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 Stepped up to the unit for persistent afib rvr.       Neuro/Psych:   -- Sedation: none  -- Pain:  Tylenol, Robaxin, Lyrica, Oxy 5s and 10s PRN             Cards:   -- HDS  -- AFib RVR, with stable blood pressure is currently.  Tachycardic to the 140s   - cardiology consulted, appreciate assistance.   - amnio loading dose and infusion started.  Metoprolol 25 b.i.d. on the Mar, we will hold if blood pressure is not amenable to this medication   - patient will need anticoagulation based on chads Vasc score.  We will defer this decision to the thoracic team, and hold any anticoagulation today.      Pulm:   -- Goal O2 sat > 90%  -- currently on 3-1/2 L, wean as able      Renal:  -- history of ESRD, dialysis dependent.  Monday Wednesday Friday schedule while in the hospital.  -- trend BUN/Cr   -- patient is anuric  Recent Labs   Lab 01/04/24  0315 01/05/24  0508 01/05/24  1345   BUN 35* 50* 17   CREATININE 4.4* 6.5* 3.2*         FEN / GI:   -- Replace lytes as needed  -- Nutrition:  Renal diet  -- MiraLax      ID:   -- Tm: afebrile; WBC within normal limits  Recent Labs   Lab 01/02/24  1242 01/03/24  0605 01/05/24  1345   WBC 12.68 9.06 10.88     -- Abx none      Heme/Onc:  -- recheck hemoglobin  -- Daily CBC  Recent Labs   Lab 01/02/24  1242 01/03/24  0605 01/05/24  1345   HGB 11.8* 11.5* 9.1*   * 161 167         Endo:   -- history of diabetes  -- Gluc goal 140-180  -- SSI      PPx:   Feeding:  Renal diet  Analgesia/Sedation:  Pain is controlled, no sedation was  Thromboembolic prevention: hep q8  HOB >30: yes  Stress Ulcer ppx: n/a  Glucose control: Critical care goal 140-180 g/dl, SSI  Bowel reg: miralax  Invasive Lines/Drains/Airway: trialysis line  Deescalation: n/a        Dispo/Code Status/Palliative:   -- SICU / Full Code

## 2024-01-05 NOTE — H&P
[facility]  Critical Care - Surgery  History & Physical    Patient Name: Lily Green  MRN: [id number]  Admission Date: 1/2/2024  Code Status: Full Code  Attending Physician: Tan Thomson MD   Primary Care Provider: Pavel Calixto MD   Principal Problem: Malignant carcinoid tumor of bronchus    Subjective:     HPI:  Patient is a 73 y.o. 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  Chest CT 10/18/23 revealed 1.2 x 1.1 cm right middle lobe nodule, previously 1.2 x 1.0 cm in July 2023. Underwent Robotic Bronchoscopy 10/20/23; path: RML nodule positive for well-differentiated neuroendocrine neoplasm, carcinoid tumor. PET/CT Cu64 11/17/23 showed known RML nodule with hypermetabolic activity.     Now POD 3 robotic-assisted surgery converted to right thoracotomy with therapeutic wedge resection and open mediastinal lymph node dissection. Stepped up to the unit after converting into afib RVR after dialysis today.  She was given 3 doses of metoprolol 5, without any response and her heart rate or rhythm.  The oxygen requirements increased to 5 L from room air.  She was stepped up to the unit for management of persistent AFib RVR.  On admission, her blood pressure is in the 120s.  She has not in acute distress.  Her heart rate approaches 130s, 140s.  She is still in AFib.    Current plans are to consult cardiology and stabilize her heart rate and rhythm.     PSH: breast biopsy with tagging, appendectomy, colostomy closure (01/16/23), BEAU, D&C of uterus, Ex Lap x2 (01/14/23 & 01/16/23), R rotator cuff repair x2 (2009/2010), tonsillectomy, UGI endoscopy (2018)  Meds: torsemide, pregabalin, dronabinol, sevelamer    Hospital/ICU Course:  No notes on file    Follow-up For: Procedure(s) (LRB):  LYMPHADENECTOMY (Right)  THORACOTOMY (Right)  THORACOTOMY, WITH INITIAL THERAPEUTIC WEDGE  RESECTION OF LUNG (Right)    Post-Operative Day: 3 Days Post-Op     Past Medical History:   Diagnosis Date    Anxiety     Celiac disease 2018    Celiac disease     Depression     Diabetes mellitus     Family history of breast cancer in mother      at age 68    Hyperlipidemia     Hypertension     Meniere disease     Meniere's disease, unspecified ear     Menopause     Peptic ulcer     Reflux esophagitis     Urinary tract infection     Vaginal infection     Vaginal Pap smear 2014    Pap/Hpv Negative        Past Surgical History:   Procedure Laterality Date    APPENDECTOMY      BREAST SURGERY      Tags    CARPAL TUNNEL RELEASE Bilateral 2017    ,     CLOSURE, COLOSTOMY Left 2023    Procedure: CLOSURE, COLOSTOMY;  Surgeon: Manuel Javier MD;  Location: [facility] OR;  Service: General;  Laterality: Left;    COLONOSCOPY  2018    Normal  ( Juan A)     COLOSTOMY Right 2023    Procedure: CREATION, COLOSTOMY;  Surgeon: Manuel Javier MD;  Location: [facility] OR;  Service: General;  Laterality: Right;    DILATION AND CURETTAGE OF UTERUS  1986    DUPUYTREN CONTRACTURE RELEASE Bilateral 2017    HYSTERECTOMY      BEAU w/ appy, no BSO     INJECTION OF NEUROLYTIC AGENT AROUND LUMBAR SYMPATHETIC NERVE N/A 2022    Procedure: BLOCK, LUMBAR SYMPATHETIC;  Surgeon: Souleymane Rizo Jr., MD;  Location: [facility] PAIN MGT;  Service: Pain Management;  Laterality: N/A;  no pacemaker   pt is diabetic     INJECTION OF NEUROLYTIC AGENT AROUND LUMBAR SYMPATHETIC NERVE N/A 2022    Procedure: BLOCK, LUMBAR SYMPATHETIC;  Surgeon: Souleymane Rizo Jr., MD;  Location: [facility] PAIN MGT;  Service: Pain Management;  Laterality: N/A;  diabetic     INSERTION OF TUNNELED CENTRAL VENOUS HEMODIALYSIS CATHETER Right 2023    Procedure: INSERTION, CATHETER, HEMODIALYSIS, DUAL LUMEN;  Surgeon: Manuel Javier MD;  Location: [facility] OR;  Service: General;  Laterality: Right;    INSERTION, CATHETER, TUNNELED Left  1/28/2023    Procedure: Insertion,catheter,tunneled;  Surgeon: Watson Fontenot MD;  Location: [facility] OR;  Service: General;  Laterality: Left;    LAPAROTOMY, EXPLORATORY N/A 1/14/2023    Procedure: LAPAROTOMY, EXPLORATORY;  Surgeon: Manuel Javier MD;  Location: [facility] OR;  Service: General;  Laterality: N/A;    LAPAROTOMY, EXPLORATORY N/A 1/16/2023    Procedure: LAPAROTOMY, EXPLORATORY;  Surgeon: Manuel Javier MD;  Location: [facility] OR;  Service: General;  Laterality: N/A;    LYMPHADENECTOMY Right 1/2/2024    Procedure: LYMPHADENECTOMY;  Surgeon: Tan Thomson MD;  Location: [facility] OR 2ND FLR;  Service: Cardiothoracic;  Laterality: Right;    REMOVAL OF CATHETER Right 1/28/2023    Procedure: REMOVAL, CATHETER;  Surgeon: Watson Fontenot MD;  Location: [facility] OR;  Service: General;  Laterality: Right;    REMOVAL, TUNNELED CATH Right 1/25/2023    Procedure: REMOVAL, TUNNELED CATH;  Surgeon: Manuel Javier MD;  Location: [facility] OR;  Service: General;  Laterality: Right;    ROBOTIC BRONCHOSCOPY N/A 10/20/2023    Procedure: ROBOTIC BRONCHOSCOPY;  Surgeon: Mayda Monzon MD;  Location: [facility] OR 2ND FLR;  Service: Pulmonary;  Laterality: N/A;    SHOULDER SURGERY  2009 & 2010    right rotator cuff    THORACOTOMY Right 1/2/2024    Procedure: THORACOTOMY;  Surgeon: Tan Thomson MD;  Location: [facility] OR 2ND FLR;  Service: Cardiothoracic;  Laterality: Right;    THORACOTOMY, WITH INITIAL THERAPEUTIC WEDGE RESECTION OF LUNG Right 1/2/2024    Procedure: THORACOTOMY, WITH INITIAL THERAPEUTIC WEDGE RESECTION OF LUNG;  Surgeon: Tan Thomson MD;  Location: [facility] OR 2ND FLR;  Service: Cardiothoracic;  Laterality: Right;    TONSILLECTOMY      UPPER GASTROINTESTINAL ENDOSCOPY  04/2018       Review of patient's allergies indicates:   Allergen Reactions    Crestor [rosuvastatin] Swelling    Gluten protein        Family History       Problem Relation (Age of Onset)    Arthritis Father    Breast cancer Mother, Paternal Aunt     Cancer Mother, Paternal Aunt    Diabetes Paternal Grandfather    Hyperlipidemia Sister    Vision loss Father, Mother, Sister          Tobacco Use    Smoking status: Never    Smokeless tobacco: Never   Substance and Sexual Activity    Alcohol use: No    Drug use: Never    Sexual activity: Not Currently     Partners: Male     Birth control/protection: See Surgical Hx     Comment: :       Review of Systems  Objective:     Vital Signs (Most Recent):  Temp: 99.7 °F (37.6 °C) (01/05/24 1425)  Pulse: (!) 139 (01/05/24 1640)  Resp: (!) 28 (01/05/24 1630)  BP: (!) 88/52 (01/05/24 1630)  SpO2: 99 % (01/05/24 1630) Vital Signs (24h Range):  Temp:  [98.5 °F (36.9 °C)-99.7 °F (37.6 °C)] 99.7 °F (37.6 °C)  Pulse:  [] 139  Resp:  [17-33] 28  SpO2:  [91 %-100 %] 99 %  BP: ()/(50-63) 88/52     Weight: 64.6 kg (142 lb 6.7 oz)  Body mass index is 23.7 kg/m².      Intake/Output Summary (Last 24 hours) at 1/5/2024 1704  Last data filed at 1/5/2024 1200  Gross per 24 hour   Intake 1080 ml   Output 2225 ml   Net -1145 ml          Physical Exam  Constitutional:       Appearance: Normal appearance.   Cardiovascular:      Rate and Rhythm: Tachycardia present. Rhythm irregular.      Pulses: Normal pulses.   Pulmonary:      Effort: Pulmonary effort is normal.   Abdominal:      General: Abdomen is flat. There is no distension.      Palpations: Abdomen is soft.      Tenderness: There is no abdominal tenderness.   Skin:     General: Skin is warm and dry.   Neurological:      General: No focal deficit present.      Mental Status: She is alert.            Vents:       Lines/Drains/Airways       Central Venous Catheter Line  Duration                  Hemodialysis Catheter 01/25/23 1501 right internal jugular 345 days              Drain  Duration                  Ileostomy 01/16/23 1230  days         Chest Tube 01/02/24 1130 Tube - 1 Right Pleural 28 Fr. 3 days              Arterial Line  Duration             Arterial Line  01/02/24 0801 3 days              Peripheral Intravenous Line  Duration                  Hemodialysis AV Fistula 09/01/23 0800 Left forearm 126 days         Peripheral IV - Single Lumen 01/04/24 1821 Right;Lateral Wrist <1 day         Peripheral IV - Single Lumen 01/05/24 1546 20 G;1 3/4 in Right Forearm <1 day                    Significant Labs:    CBC/Anemia Profile:  Recent Labs   Lab 01/05/24  1345   WBC 10.88   HGB 9.1*   HCT 27.4*      MCV 93   RDW 15.3*        Chemistries:  Recent Labs   Lab 01/04/24 0315 01/05/24  0508 01/05/24  1345   * 132* 137   K 5.1 4.9 4.4   CL 96 93* 102   CO2 23 25 21*   BUN 35* 50* 17   CREATININE 4.4* 6.5* 3.2*   CALCIUM 9.4 9.2 9.1   ALBUMIN 3.2* 2.8* 3.0*   PROT  --   --  7.1   BILITOT  --   --  1.1*   ALKPHOS  --   --  95   ALT  --   --  <5*   AST  --   --  23   MG  --   --  1.9   PHOS 4.7* 4.5 2.9       BMP:   Recent Labs   Lab 01/05/24  1345   *      K 4.4      CO2 21*   BUN 17   CREATININE 3.2*   CALCIUM 9.1   MG 1.9     CMP:   Recent Labs   Lab 01/04/24 0315 01/05/24  0508 01/05/24  1345   * 132* 137   K 5.1 4.9 4.4   CL 96 93* 102   CO2 23 25 21*   * 103 155*   BUN 35* 50* 17   CREATININE 4.4* 6.5* 3.2*   CALCIUM 9.4 9.2 9.1   PROT  --   --  7.1   ALBUMIN 3.2* 2.8* 3.0*   BILITOT  --   --  1.1*   ALKPHOS  --   --  95   AST  --   --  23   ALT  --   --  <5*   ANIONGAP 14 14 14       Significant Imaging: I have reviewed all pertinent imaging results/findings within the past 24 hours.  Assessment/Plan:     Cardiac/Vascular  Atrial fibrillation with rapid ventricular response  72 yo female with 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 Stepped up to the unit for persistent afib rvr.       Neuro/Psych:   -- Sedation: none  -- Pain:  Tylenol, Robaxin, Lyrica, Oxy 5s and 10s PRN             Cards:   -- HDS  -- AFib RVR, with stable blood pressure is  currently.  Tachycardic to the 140s   - cardiology consulted, appreciate assistance.   - amnio loading dose and infusion started.  Metoprolol 25 b.i.d. on the Mar, we will hold if blood pressure is not amenable to this medication   - patient will need anticoagulation based on chads Vasc score.  We will defer this decision to the thoracic team, and hold any anticoagulation today.      Pulm:   -- Goal O2 sat > 90%  -- currently on 3-1/2 L, wean as able      Renal:  -- history of ESRD, dialysis dependent.  Monday Wednesday Friday schedule while in the hospital.  -- trend BUN/Cr   -- patient is anuric  Recent Labs   Lab 01/04/24  0315 01/05/24  0508 01/05/24  1345   BUN 35* 50* 17   CREATININE 4.4* 6.5* 3.2*         FEN / GI:   -- Replace lytes as needed  -- Nutrition:  Renal diet  -- MiraLax      ID:   -- Tm: afebrile; WBC within normal limits  Recent Labs   Lab 01/02/24  1242 01/03/24  0605 01/05/24  1345   WBC 12.68 9.06 10.88     -- Abx none      Heme/Onc:  -- recheck hemoglobin  -- Daily CBC  Recent Labs   Lab 01/02/24  1242 01/03/24  0605 01/05/24  1345   HGB 11.8* 11.5* 9.1*   * 161 167         Endo:   -- history of diabetes  -- Gluc goal 140-180  -- SSI      PPx:   Feeding:  Renal diet  Analgesia/Sedation:  Pain is controlled, no sedation was  Thromboembolic prevention: hep q8  HOB >30: yes  Stress Ulcer ppx: n/a  Glucose control: Critical care goal 140-180 g/dl, SSI  Bowel reg: miralax  Invasive Lines/Drains/Airway: trialysis line  Deescalation: n/a        Dispo/Code Status/Palliative:   -- SICU / Full Code             Critical care was time spent personally by me on the following activities: development of treatment plan with patient or surrogate and bedside caregivers, discussions with consultants, evaluation of patient's response to treatment, examination of patient, ordering and performing treatments and interventions, ordering and review of laboratory studies, ordering and review of radiographic  studies, pulse oximetry, re-evaluation of patient's condition.  This critical care time did not overlap with that of any other provider or involve time for any procedures.     Brooklyn Sapp MD  Critical Care - Surgery  [facility]

## 2024-01-05 NOTE — CARE UPDATE
Thoracic Surgery Care Update    Contacted regarding patient developing tachycardia with syncope in HD. 1.5 L removed in HD - 750 cc NS given as bolus. Located patient on the floor - noted to be in afib RVR with HR 120s - 160s on airstrip. She feels weak with mild new chest pain, otherwise asymptomatic. She remained normotensive during this.     [place] and Charge RN called. Labs, CXR ordered and obtained. She began to have mild desaturation and was placed on nasal cannula. Metop 5 mg x 3 doses given - did not consistently break rhythm. CXR without detrimental interval change. Decision made to step patient up to the ICU for continued treatment of atrial fibrillation and hemodynamic monitoring with plans to involve cardiology.     Kat Flores MD  PGY-4, General Surgery  [facility]

## 2024-01-05 NOTE — PT/OT/SLP PROGRESS
Occupational Therapy      Patient Name:  Lily Green   MRN:  [id number]    Patient not seen today secondary to Dialysis, Off the floor. Will follow-up .    1/5/2024

## 2024-01-05 NOTE — PROGRESS NOTES
Stat EKG complete, ok to transport pt back to room at this per Dr. Johnny FLOREZ Nephrology will notify pt primary team, MD and transport returning pt to room via stretcher. Pt awake, alert.

## 2024-01-06 LAB
ALBUMIN SERPL BCP-MCNC: 2.4 G/DL (ref 3.5–5.2)
ALBUMIN SERPL BCP-MCNC: 2.4 G/DL (ref 3.5–5.2)
ALP SERPL-CCNC: 83 U/L (ref 55–135)
ALT SERPL W/O P-5'-P-CCNC: <5 U/L (ref 10–44)
ANION GAP SERPL CALC-SCNC: 13 MMOL/L (ref 8–16)
ANION GAP SERPL CALC-SCNC: 13 MMOL/L (ref 8–16)
ANISOCYTOSIS BLD QL SMEAR: SLIGHT
ASCENDING AORTA: 3.68 CM
AST SERPL-CCNC: 20 U/L (ref 10–40)
AV INDEX (PROSTH): 0.65
AV PEAK GRADIENT: 13 MMHG
AV VALVE AREA BY VELOCITY RATIO: 1.77 CM²
AV VALVE AREA: 1.89 CM²
AV VELOCITY RATIO: 0.61
BASOPHILS # BLD AUTO: 0.02 K/UL (ref 0–0.2)
BASOPHILS # BLD AUTO: 0.02 K/UL (ref 0–0.2)
BASOPHILS NFR BLD: 0.2 % (ref 0–1.9)
BASOPHILS NFR BLD: 0.2 % (ref 0–1.9)
BILIRUB SERPL-MCNC: 0.6 MG/DL (ref 0.1–1)
BLD PROD TYP BPU: NORMAL
BLOOD UNIT EXPIRATION DATE: NORMAL
BLOOD UNIT TYPE CODE: 6200
BLOOD UNIT TYPE: NORMAL
BSA FOR ECHO PROCEDURE: 1.78 M2
BUN SERPL-MCNC: 28 MG/DL (ref 8–23)
BUN SERPL-MCNC: 28 MG/DL (ref 8–23)
CALCIUM SERPL-MCNC: 8.8 MG/DL (ref 8.7–10.5)
CALCIUM SERPL-MCNC: 8.8 MG/DL (ref 8.7–10.5)
CHLORIDE SERPL-SCNC: 97 MMOL/L (ref 95–110)
CHLORIDE SERPL-SCNC: 97 MMOL/L (ref 95–110)
CO2 SERPL-SCNC: 22 MMOL/L (ref 23–29)
CO2 SERPL-SCNC: 22 MMOL/L (ref 23–29)
CODING SYSTEM: NORMAL
CREAT SERPL-MCNC: 4.1 MG/DL (ref 0.5–1.4)
CREAT SERPL-MCNC: 4.1 MG/DL (ref 0.5–1.4)
CROSSMATCH INTERPRETATION: NORMAL
CV ECHO LV RWT: 0.29 CM
DIFFERENTIAL METHOD BLD: ABNORMAL
DIFFERENTIAL METHOD BLD: ABNORMAL
DISPENSE STATUS: NORMAL
DOP CALC AO PEAK VEL: 1.8 M/S
DOP CALC AO VTI: 30.61 CM
DOP CALC LVOT AREA: 2.9 CM2
DOP CALC LVOT DIAMETER: 1.92 CM
DOP CALC LVOT PEAK VEL: 1.1 M/S
DOP CALC LVOT STROKE VOLUME: 57.88 CM3
DOP CALCLVOT PEAK VEL VTI: 20 CM
E WAVE DECELERATION TIME: 208.18 MSEC
E/A RATIO: 1.33
E/E' RATIO: 8.4 M/S
ECHO LV POSTERIOR WALL: 0.69 CM (ref 0.6–1.1)
EOSINOPHIL # BLD AUTO: 0 K/UL (ref 0–0.5)
EOSINOPHIL # BLD AUTO: 0 K/UL (ref 0–0.5)
EOSINOPHIL NFR BLD: 0.2 % (ref 0–8)
EOSINOPHIL NFR BLD: 0.3 % (ref 0–8)
ERYTHROCYTE [DISTWIDTH] IN BLOOD BY AUTOMATED COUNT: 15.4 % (ref 11.5–14.5)
ERYTHROCYTE [DISTWIDTH] IN BLOOD BY AUTOMATED COUNT: 15.5 % (ref 11.5–14.5)
EST. GFR  (NO RACE VARIABLE): 10.9 ML/MIN/1.73 M^2
EST. GFR  (NO RACE VARIABLE): 10.9 ML/MIN/1.73 M^2
FRACTIONAL SHORTENING: 39 % (ref 28–44)
GLUCOSE SERPL-MCNC: 138 MG/DL (ref 70–110)
GLUCOSE SERPL-MCNC: 138 MG/DL (ref 70–110)
HCT VFR BLD AUTO: 22.3 % (ref 37–48.5)
HCT VFR BLD AUTO: 24.4 % (ref 37–48.5)
HGB BLD-MCNC: 7.7 G/DL (ref 12–16)
HGB BLD-MCNC: 8.1 G/DL (ref 12–16)
IMM GRANULOCYTES # BLD AUTO: 0.05 K/UL (ref 0–0.04)
IMM GRANULOCYTES # BLD AUTO: 0.06 K/UL (ref 0–0.04)
IMM GRANULOCYTES NFR BLD AUTO: 0.5 % (ref 0–0.5)
IMM GRANULOCYTES NFR BLD AUTO: 0.6 % (ref 0–0.5)
INTERVENTRICULAR SEPTUM: 0.9 CM (ref 0.6–1.1)
IVRT: 43.77 MSEC
LA MAJOR: 5.5 CM
LA MINOR: 5.14 CM
LA WIDTH: 3.89 CM
LEFT ATRIUM SIZE: 3.86 CM
LEFT ATRIUM VOLUME INDEX MOD: 26.1 ML/M2
LEFT ATRIUM VOLUME INDEX: 38.3 ML/M2
LEFT ATRIUM VOLUME MOD: 46.21 CM3
LEFT ATRIUM VOLUME: 67.82 CM3
LEFT INTERNAL DIMENSION IN SYSTOLE: 2.87 CM (ref 2.1–4)
LEFT VENTRICLE DIASTOLIC VOLUME INDEX: 57.8 ML/M2
LEFT VENTRICLE DIASTOLIC VOLUME: 102.3 ML
LEFT VENTRICLE MASS INDEX: 69 G/M2
LEFT VENTRICLE SYSTOLIC VOLUME INDEX: 17.7 ML/M2
LEFT VENTRICLE SYSTOLIC VOLUME: 31.39 ML
LEFT VENTRICULAR INTERNAL DIMENSION IN DIASTOLE: 4.7 CM (ref 3.5–6)
LEFT VENTRICULAR MASS: 121.27 G
LV LATERAL E/E' RATIO: 7 M/S
LV SEPTAL E/E' RATIO: 10.5 M/S
LYMPHOCYTES # BLD AUTO: 1.2 K/UL (ref 1–4.8)
LYMPHOCYTES # BLD AUTO: 1.4 K/UL (ref 1–4.8)
LYMPHOCYTES NFR BLD: 10.8 % (ref 18–48)
LYMPHOCYTES NFR BLD: 13.7 % (ref 18–48)
MAGNESIUM SERPL-MCNC: 2 MG/DL (ref 1.6–2.6)
MCH RBC QN AUTO: 29.7 PG (ref 27–31)
MCH RBC QN AUTO: 30.2 PG (ref 27–31)
MCHC RBC AUTO-ENTMCNC: 33.2 G/DL (ref 32–36)
MCHC RBC AUTO-ENTMCNC: 34.5 G/DL (ref 32–36)
MCV RBC AUTO: 86 FL (ref 82–98)
MCV RBC AUTO: 91 FL (ref 82–98)
MONOCYTES # BLD AUTO: 1.5 K/UL (ref 0.3–1)
MONOCYTES # BLD AUTO: 2.5 K/UL (ref 0.3–1)
MONOCYTES NFR BLD: 16.7 % (ref 4–15)
MONOCYTES NFR BLD: 18.9 % (ref 4–15)
MV PEAK A VEL: 0.63 M/S
MV PEAK E VEL: 0.84 M/S
MV STENOSIS PRESSURE HALF TIME: 60.37 MS
MV VALVE AREA P 1/2 METHOD: 3.64 CM2
NEUTROPHILS # BLD AUTO: 6.2 K/UL (ref 1.8–7.7)
NEUTROPHILS # BLD AUTO: 9 K/UL (ref 1.8–7.7)
NEUTROPHILS NFR BLD: 68.5 % (ref 38–73)
NEUTROPHILS NFR BLD: 69.4 % (ref 38–73)
NRBC BLD-RTO: 0 /100 WBC
NRBC BLD-RTO: 0 /100 WBC
NUM UNITS TRANS PACKED RBC: NORMAL
PHOSPHATE SERPL-MCNC: 4.4 MG/DL (ref 2.7–4.5)
PHOSPHATE SERPL-MCNC: 4.4 MG/DL (ref 2.7–4.5)
PISA TR MAX VEL: 2.68 M/S
PLATELET # BLD AUTO: 182 K/UL (ref 150–450)
PLATELET # BLD AUTO: 214 K/UL (ref 150–450)
PLATELET BLD QL SMEAR: ABNORMAL
PMV BLD AUTO: 10.5 FL (ref 9.2–12.9)
PMV BLD AUTO: 11.7 FL (ref 9.2–12.9)
POCT GLUCOSE: 115 MG/DL (ref 70–110)
POCT GLUCOSE: 122 MG/DL (ref 70–110)
POCT GLUCOSE: 127 MG/DL (ref 70–110)
POCT GLUCOSE: 128 MG/DL (ref 70–110)
POCT GLUCOSE: 139 MG/DL (ref 70–110)
POCT GLUCOSE: 173 MG/DL (ref 70–110)
POTASSIUM SERPL-SCNC: 4.9 MMOL/L (ref 3.5–5.1)
PROT SERPL-MCNC: 6 G/DL (ref 6–8.4)
RA MAJOR: 4.38 CM
RA PRESSURE ESTIMATED: 3 MMHG
RA WIDTH: 3.24 CM
RBC # BLD AUTO: 2.59 M/UL (ref 4–5.4)
RBC # BLD AUTO: 2.68 M/UL (ref 4–5.4)
RIGHT VENTRICULAR END-DIASTOLIC DIMENSION: 2.75 CM
RV TB RVSP: 6 MMHG
SINUS: 3.15 CM
SODIUM SERPL-SCNC: 132 MMOL/L (ref 136–145)
SODIUM SERPL-SCNC: 132 MMOL/L (ref 136–145)
STJ: 2.31 CM
TDI LATERAL: 0.12 M/S
TDI SEPTAL: 0.08 M/S
TDI: 0.1 M/S
TR MAX PG: 29 MMHG
TRICUSPID ANNULAR PLANE SYSTOLIC EXCURSION: 2.09 CM
TROPONIN I SERPL DL<=0.01 NG/ML-MCNC: 0.11 NG/ML (ref 0–0.03)
TROPONIN I SERPL DL<=0.01 NG/ML-MCNC: 0.12 NG/ML (ref 0–0.03)
TROPONIN I SERPL DL<=0.01 NG/ML-MCNC: 0.13 NG/ML (ref 0–0.03)
TV REST PULMONARY ARTERY PRESSURE: 32 MMHG
WBC # BLD AUTO: 13 K/UL (ref 3.9–12.7)
WBC # BLD AUTO: 8.99 K/UL (ref 3.9–12.7)
Z-SCORE OF LEFT VENTRICULAR DIMENSION IN END DIASTOLE: -0.4
Z-SCORE OF LEFT VENTRICULAR DIMENSION IN END SYSTOLE: -0.42

## 2024-01-06 PROCEDURE — 82962 GLUCOSE BLOOD TEST: CPT

## 2024-01-06 PROCEDURE — 80069 RENAL FUNCTION PANEL: CPT | Performed by: SURGERY

## 2024-01-06 PROCEDURE — 99900035 HC TECH TIME PER 15 MIN (STAT)

## 2024-01-06 PROCEDURE — 85025 COMPLETE CBC W/AUTO DIFF WBC: CPT | Mod: 91 | Performed by: SURGERY

## 2024-01-06 PROCEDURE — 63600175 PHARM REV CODE 636 W HCPCS

## 2024-01-06 PROCEDURE — 99233 SBSQ HOSP IP/OBS HIGH 50: CPT | Mod: ,,, | Performed by: INTERNAL MEDICINE

## 2024-01-06 PROCEDURE — 25000003 PHARM REV CODE 250: Performed by: SURGERY

## 2024-01-06 PROCEDURE — 93010 ELECTROCARDIOGRAM REPORT: CPT | Mod: ,,, | Performed by: INTERNAL MEDICINE

## 2024-01-06 PROCEDURE — 99232 SBSQ HOSP IP/OBS MODERATE 35: CPT | Mod: GC,,, | Performed by: ANESTHESIOLOGY

## 2024-01-06 PROCEDURE — 83735 ASSAY OF MAGNESIUM: CPT

## 2024-01-06 PROCEDURE — 63600175 PHARM REV CODE 636 W HCPCS: Performed by: PHYSICIAN ASSISTANT

## 2024-01-06 PROCEDURE — 93005 ELECTROCARDIOGRAM TRACING: CPT

## 2024-01-06 PROCEDURE — 84484 ASSAY OF TROPONIN QUANT: CPT | Mod: 91

## 2024-01-06 PROCEDURE — 94761 N-INVAS EAR/PLS OXIMETRY MLT: CPT

## 2024-01-06 PROCEDURE — 84484 ASSAY OF TROPONIN QUANT: CPT

## 2024-01-06 PROCEDURE — 25000003 PHARM REV CODE 250

## 2024-01-06 PROCEDURE — 84484 ASSAY OF TROPONIN QUANT: CPT | Mod: 91 | Performed by: SURGERY

## 2024-01-06 PROCEDURE — 25000003 PHARM REV CODE 250: Performed by: PHYSICIAN ASSISTANT

## 2024-01-06 PROCEDURE — 80053 COMPREHEN METABOLIC PANEL: CPT

## 2024-01-06 PROCEDURE — 96372 THER/PROPH/DIAG INJ SC/IM: CPT

## 2024-01-06 PROCEDURE — 20000000 HC ICU ROOM

## 2024-01-06 PROCEDURE — 99232 SBSQ HOSP IP/OBS MODERATE 35: CPT | Mod: ,,, | Performed by: NURSE PRACTITIONER

## 2024-01-06 PROCEDURE — 84132 ASSAY OF SERUM POTASSIUM: CPT

## 2024-01-06 PROCEDURE — 25000003 PHARM REV CODE 250: Performed by: STUDENT IN AN ORGANIZED HEALTH CARE EDUCATION/TRAINING PROGRAM

## 2024-01-06 PROCEDURE — 85025 COMPLETE CBC W/AUTO DIFF WBC: CPT

## 2024-01-06 PROCEDURE — 25000003 PHARM REV CODE 250: Performed by: INTERNAL MEDICINE

## 2024-01-06 RX ORDER — AMIODARONE HYDROCHLORIDE 200 MG/1
400 TABLET ORAL 2 TIMES DAILY
Status: DISCONTINUED | OUTPATIENT
Start: 2024-01-06 | End: 2024-01-08

## 2024-01-06 RX ORDER — AMIODARONE HYDROCHLORIDE 200 MG/1
200 TABLET ORAL DAILY
Status: DISCONTINUED | OUTPATIENT
Start: 2024-01-20 | End: 2024-01-08

## 2024-01-06 RX ORDER — ONDANSETRON HYDROCHLORIDE 2 MG/ML
8 INJECTION, SOLUTION INTRAVENOUS EVERY 8 HOURS PRN
Status: DISCONTINUED | OUTPATIENT
Start: 2024-01-06 | End: 2024-01-30

## 2024-01-06 RX ORDER — GLYCERIN 1 G/1
1 SUPPOSITORY RECTAL ONCE
Status: COMPLETED | OUTPATIENT
Start: 2024-01-06 | End: 2024-01-06

## 2024-01-06 RX ORDER — ONDANSETRON HYDROCHLORIDE 2 MG/ML
INJECTION, SOLUTION INTRAVENOUS
Status: COMPLETED
Start: 2024-01-06 | End: 2024-01-06

## 2024-01-06 RX ADMIN — ONDANSETRON 4 MG: 2 INJECTION INTRAMUSCULAR; INTRAVENOUS at 08:01

## 2024-01-06 RX ADMIN — ATORVASTATIN CALCIUM 40 MG: 40 TABLET, FILM COATED ORAL at 08:01

## 2024-01-06 RX ADMIN — HEPARIN SODIUM 5000 UNITS: 5000 INJECTION INTRAVENOUS; SUBCUTANEOUS at 06:01

## 2024-01-06 RX ADMIN — AMIODARONE HYDROCHLORIDE 400 MG: 200 TABLET ORAL at 08:01

## 2024-01-06 RX ADMIN — SEVELAMER CARBONATE 1600 MG: 800 TABLET, FILM COATED ORAL at 04:01

## 2024-01-06 RX ADMIN — PANTOPRAZOLE SODIUM 40 MG: 40 TABLET, DELAYED RELEASE ORAL at 08:01

## 2024-01-06 RX ADMIN — PREGABALIN 75 MG: 75 CAPSULE ORAL at 04:01

## 2024-01-06 RX ADMIN — TORSEMIDE 40 MG: 20 TABLET ORAL at 08:01

## 2024-01-06 RX ADMIN — ACETAMINOPHEN 650 MG: 325 TABLET ORAL at 01:01

## 2024-01-06 RX ADMIN — MUPIROCIN: 20 OINTMENT TOPICAL at 08:01

## 2024-01-06 RX ADMIN — SEVELAMER CARBONATE 1600 MG: 800 TABLET, FILM COATED ORAL at 08:01

## 2024-01-06 RX ADMIN — LIDOCAINE 5% 1 PATCH: 700 PATCH TOPICAL at 08:01

## 2024-01-06 RX ADMIN — METHOCARBAMOL 500 MG: 500 TABLET ORAL at 08:01

## 2024-01-06 RX ADMIN — POLYETHYLENE GLYCOL 3350 17 G: 17 POWDER, FOR SOLUTION ORAL at 08:01

## 2024-01-06 RX ADMIN — SEVELAMER CARBONATE 1600 MG: 800 TABLET, FILM COATED ORAL at 11:01

## 2024-01-06 RX ADMIN — MIDODRINE HYDROCHLORIDE 15 MG: 5 TABLET ORAL at 04:01

## 2024-01-06 RX ADMIN — METOPROLOL TARTRATE 25 MG: 25 TABLET, FILM COATED ORAL at 08:01

## 2024-01-06 RX ADMIN — GLYCERIN 1 SUPPOSITORY: 2 SUPPOSITORY RECTAL at 07:01

## 2024-01-06 RX ADMIN — SENNOSIDES AND DOCUSATE SODIUM 1 TABLET: 8.6; 5 TABLET ORAL at 08:01

## 2024-01-06 RX ADMIN — MIDODRINE HYDROCHLORIDE 15 MG: 5 TABLET ORAL at 11:01

## 2024-01-06 RX ADMIN — MIDODRINE HYDROCHLORIDE 15 MG: 5 TABLET ORAL at 08:01

## 2024-01-06 RX ADMIN — AMIODARONE HYDROCHLORIDE 0.5 MG/MIN: 1.8 INJECTION, SOLUTION INTRAVENOUS at 09:01

## 2024-01-06 RX ADMIN — ACETAMINOPHEN 650 MG: 325 TABLET ORAL at 09:01

## 2024-01-06 RX ADMIN — SODIUM CHLORIDE, POTASSIUM CHLORIDE, SODIUM LACTATE AND CALCIUM CHLORIDE 500 ML: 600; 310; 30; 20 INJECTION, SOLUTION INTRAVENOUS at 01:01

## 2024-01-06 RX ADMIN — HEPARIN SODIUM 5000 UNITS: 5000 INJECTION INTRAVENOUS; SUBCUTANEOUS at 01:01

## 2024-01-06 RX ADMIN — ACETAMINOPHEN 650 MG: 325 TABLET ORAL at 06:01

## 2024-01-06 RX ADMIN — HEPARIN SODIUM 5000 UNITS: 5000 INJECTION INTRAVENOUS; SUBCUTANEOUS at 09:01

## 2024-01-06 RX ADMIN — AMIODARONE HYDROCHLORIDE 400 MG: 200 TABLET ORAL at 11:01

## 2024-01-06 NOTE — PLAN OF CARE
"      SICU PLAN OF CARE NOTE    Dx: Malignant carcinoid tumor of bronchus    Shift Events: 500cc LR bolus and 15mg midodrine for hypotension. Levophed gtt initiated; later titrated off at 0400.    Goals of Care: MAP >60    Neuro: AAO x4, Follows Commands, and Moves All Extremities    Vital Signs: BP (!) 91/50   Pulse 76   Temp 99 °F (37.2 °C) (Oral)   Resp 16   Ht 5' 5" (1.651 m)   Wt 64.6 kg (142 lb 6.7 oz)   LMP  (LMP Unknown) Comment: BEAU/ NO BSO  1986  SpO2 (!) 91%   Breastfeeding No   BMI 23.70 kg/m²     Cardiac:NSR    Respiratory: Nasal Cannula    Diet: Regular Diet    Gtts: Norepinephrine and Amiodarone    Urine Output: Anuric 0 cc/shift    Drains: Chest Tube, total output 10 cc /  shift     Labs/Accuchecks: Daily labs/accuchecks q4.    Skin: Bed plugged in with pads and foams in place. Pt andrea to weight shift and turn with minimal assist. R. Back incision noted upon assessment. See flowsheet for skin and line details.      "

## 2024-01-06 NOTE — PLAN OF CARE
SICU PLAN OF CARE NOTE    Dx: Malignant carcinoid tumor of bronchus    Goals of Care: wean oxygen, control heartrate    Vital Signs (last 12 hours):   Temp:  [98.5 °F (36.9 °C)-99.7 °F (37.6 °C)]   Pulse:  []   Resp:  [20-33]   BP: ()/(46-63)   SpO2:  [93 %-100 %]      Neuro: AAO x4, Follows Commands, and Moves All Extremities    Cardiac: NSR    Respiratory: Nasal Cannula    Gtts: Amio at 1    Urine Output: Anuric   cc/shift        Drains: Chest Tube, total output 0 cc /  shift    Diet: Regular Diet       Labs/Accuchecks: accu checks every 4 hrs    Skin:  All skin remains free from injury.  Patient turned Q2,   mattress inflated, and bed working correctly.    Shift Events:   .  See flowsheet for further assessment/details.  Family updated on current condition/plan of care, questions answered, and emotional support provided.  MD updated on current condition, vitals, labs, and gtts.  No new orders received, will continue to monitor.

## 2024-01-06 NOTE — PLAN OF CARE
Spoke w/ cardiology @ 2594 concerning up-trending troponins of 0.108 -> 0.117. Patient complaining of intermittent chest pain that is reproducible on palpation. No radiation or SOB. Recommended to continue trending trops, order EKG, and order new echo. Discussed that demand ischemia secondary to recent hypotension may be possible etiology.     Abdirashid Camacho MD

## 2024-01-06 NOTE — ASSESSMENT & PLAN NOTE
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 On HD ~ 1 year. Last HD prior to presentation 1/1/24. Pt has NELY AVF placed on 10/23 that has not been cleared by vascular. Currently using RIJ TDC.     Nephrology History  iHD Schedule: TTS   Unit/MD: [facility]/ Dr. Vyas  Duration: 3.5 hours   UF:   EDW: 58 kg   Access: RIJ TDC  Residual Renal Function: minimal     Assessment:   - Last HD 01/05, no need of HD today.  - midodrine as needed with HD   - continue torsemide   - continue sevelamer 800 TIDWM  - Hgb goal 10-11, continue epo with HD   - Renal diet, if not NPO    -pre and post HD weight   - Continue to monitor intake and output, daily weights   - Avoid nephrotoxic medication and renal dose medications to GFR

## 2024-01-06 NOTE — PROGRESS NOTES
[facility]  Nephrology  Progress Note    Patient Name: Lily Green  MRN: [id number]  Admission Date: 1/2/2024  Hospital Length of Stay: 4 days  Attending Provider: Tan Thomson MD   Primary Care Physician: Pavel Calixto MD  Principal Problem:Malignant carcinoid tumor of bronchus    Subjective:     HPI: Patient is a 73 y.o. 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 S/p thoracotomy. Last HD prior to presentation 1/1/24. Nephrology consulted for ESRD on HD.        Interval History:   Stepped up to ICU due to hypotension, HD yesterday.    Review of patient's allergies indicates:   Allergen Reactions    Crestor [rosuvastatin] Swelling    Gluten protein      Current Facility-Administered Medications   Medication Frequency    acetaminophen tablet 650 mg Q4H PRN    acetaminophen tablet 650 mg Q8H    amiodarone 360 mg/200 mL (1.8 mg/mL) infusion Continuous    [START ON 1/20/2024] amiodarone tablet 200 mg Daily    amiodarone tablet 400 mg BID    atorvastatin tablet 40 mg QHS    bisacodyL suppository 10 mg Daily PRN    dextrose 10% bolus 125 mL 125 mL PRN    dextrose 10% bolus 250 mL 250 mL PRN    glucagon (human recombinant) injection 1 mg PRN    glucose chewable tablet 16 g PRN    glucose chewable tablet 24 g PRN    heparin (porcine) injection 5,000 Units Q8H    insulin aspart U-100 pen 0-5 Units Q4H PRN    lactated ringers bolus 500 mL Once    LIDOcaine 5 % patch 1 patch Q24H    methocarbamoL tablet 500 mg BID    metoprolol injection 5 mg Q5 Min PRN    midodrine tablet 10 mg PRN    midodrine tablet 15 mg TID WM    mupirocin 2 % ointment BID    NORepinephrine 4 mg in dextrose 5% 250 mL infusion (premix) Continuous    ondansetron disintegrating tablet 8 mg Q8H PRN    oxyCODONE immediate release tablet 5 mg Q4H PRN    oxyCODONE immediate release tablet Tab 10 mg  Q4H PRN    pantoprazole EC tablet 40 mg Daily    polyethylene glycol packet 17 g Daily    pregabalin capsule 75 mg Daily    senna-docusate 8.6-50 mg per tablet 1 tablet BID    sevelamer carbonate tablet 1,600 mg TID WM    simethicone chewable tablet 80 mg TID PRN    torsemide tablet 40 mg Daily       Objective:     Vital Signs (Most Recent):  Temp: 98.6 °F (37 °C) (01/06/24 1101)  Pulse: 75 (01/06/24 1215)  Resp: 17 (01/06/24 1215)  BP: (!) 78/50 (01/06/24 1200)  SpO2: (!) 92 % (01/06/24 1215) Vital Signs (24h Range):  Temp:  [98.6 °F (37 °C)-99.7 °F (37.6 °C)] 98.6 °F (37 °C)  Pulse:  [] 75  Resp:  [12-39] 17  SpO2:  [90 %-100 %] 92 %  BP: ()/(36-63) 78/50  Arterial Line BP: ()/(34-59) 88/38     Weight: 69.5 kg (153 lb 3.5 oz) (01/06/24 0600)  Body mass index is 25.5 kg/m².  Body surface area is 1.79 meters squared.    I/O last 3 completed shifts:  In: 2425.5 [P.O.:80; I.V.:377.7; Other:1000; IV Piggyback:967.9]  Out: 2110 [Other:1990; Chest Tube:120]     Physical Exam  Pulmonary:      Effort: Pulmonary effort is normal.   Abdominal:      Palpations: Abdomen is soft.   Musculoskeletal:         General: No swelling.      Right lower leg: No edema.      Left lower leg: No edema.   Skin:     General: Skin is warm.   Neurological:      General: No focal deficit present.      Mental Status: She is alert and oriented to person, place, and time.   Psychiatric:         Mood and Affect: Mood normal.         Behavior: Behavior normal.          Significant Labs:  BMP:   Recent Labs   Lab 01/06/24  0303   *  138*   *  132*   K 4.9  4.9   CL 97  97   CO2 22*  22*   BUN 28*  28*   CREATININE 4.1*  4.1*   CALCIUM 8.8  8.8   MG 2.0     CBC:   Recent Labs   Lab 01/06/24  0303   WBC 13.00*   RBC 2.59*   HGB 7.7*   HCT 22.3*      MCV 86   MCH 29.7   MCHC 34.5        Significant Imaging:  Labs: Reviewed  Assessment/Plan:     Renal/  ESRD (end stage renal disease)  S/p R thoracotomy due  to RML carcinoid tumor. On HD ~ 1 year. Last HD prior to presentation 1/1/24. Pt has NELY AVF placed on 10/23 that has not been cleared by vascular. Currently using RIJ TDC.     Nephrology History  iHD Schedule: TTS   Unit/MD: [facility]/ Dr. Vyas  Duration: 3.5 hours   UF:   EDW: 58 kg   Access: RIJ TDC  Residual Renal Function: minimal     Assessment:   - Last HD 01/05, no need of HD today.  - midodrine as needed with HD   - continue torsemide   - continue sevelamer 800 TIDWM  - Hgb goal 10-11, continue epo with HD   - Renal diet, if not NPO    -pre and post HD weight   - Continue to monitor intake and output, daily weights   - Avoid nephrotoxic medication and renal dose medications to GFR                      Oncology  * Malignant carcinoid tumor of bronchus  -management per primary         Thank you for your consult. I will follow-up with patient. Please contact us if you have any additional questions.    Hanane Ortega MD  Nephrology  [facility]

## 2024-01-06 NOTE — SUBJECTIVE & OBJECTIVE
"Interval HPI:   Overnight events: No acute events overnight. Patient in room 24293 CVICU/22496 CVICU A. Blood glucose stable. BG at goal on current insulin regimen (SSI ). Steroid use- None. 4 Days Post-Op  Renal function- Abnormal - Creatinine 4.1   Vasopressors-  Norepinephrine       Endocrine will continue to follow and manage insulin orders inpatient.         Diet diabetic Renal, Gluten Free, Soft & Bite Sized (IDDSI Level 6);  Calorie     Eatin%  Nausea: No  Hypoglycemia and intervention: No  Fever: No  TPN and/or TF: No      BP (!) 86/52   Pulse 90   Temp 98.8 °F (37.1 °C) (Oral)   Resp (!) 39   Ht 5' 5" (1.651 m)   Wt 69.5 kg (153 lb 3.5 oz)   LMP  (LMP Unknown) Comment: BEAU/ NO BSO    SpO2 (!) 93%   Breastfeeding No   BMI 25.50 kg/m²     Labs Reviewed and Include    Recent Labs   Lab 24  0303   *  138*   CALCIUM 8.8  8.8   ALBUMIN 2.4*  2.4*   PROT 6.0   *  132*   K 4.9  4.9   CO2 22*  22*   CL 97  97   BUN 28*  28*   CREATININE 4.1*  4.1*   ALKPHOS 83   ALT <5*   AST 20   BILITOT 0.6     Lab Results   Component Value Date    WBC 13.00 (H) 2024    HGB 7.7 (L) 2024    HCT 22.3 (L) 2024    MCV 86 2024     2024     No results for input(s): "TSH", "FREET4" in the last 168 hours.  Lab Results   Component Value Date    HGBA1C 4.9 11/15/2023       Nutritional status:   Body mass index is 25.5 kg/m².  Lab Results   Component Value Date    ALBUMIN 2.4 (L) 2024    ALBUMIN 2.4 (L) 2024    ALBUMIN 3.0 (L) 2024     No results found for: "PREALBUMIN"    Estimated Creatinine Clearance: 12 mL/min (A) (based on SCr of 4.1 mg/dL (H)).    Accu-Checks  Recent Labs     24  1117 24  1644 24  1933 24  2338 24  0502 24  0724 24  1706 24  2007 24  2319 24  0359   POCTGLUCOSE 194* 193* 131* 143* 113* 108 185* 171* 202* 128*       Current Medications and/or Treatments " Impacting Glycemic Control  Immunotherapy:    Immunosuppressants       None          Steroids:   Hormones (From admission, onward)      None          Pressors:    Autonomic Drugs (From admission, onward)      Start     Stop Route Frequency Ordered    01/06/24 0745  midodrine tablet 15 mg         -- Oral 3 times daily with meals 01/05/24 2327    01/05/24 2345  NORepinephrine 4 mg in dextrose 5% 250 mL infusion (premix)        Question Answer Comment   Begin at (in mcg/kg/min): 0.02    Titrate by: (in mcg/kg/min) 0.02    Titrate interval: (in minutes) 5    Titrate to maintain: (MAP or SBP) MAP    Greater than: (in mmHg) 65    Maximum dose: (in mcg/kg/min) 3        -- IV Continuous 01/05/24 2233 01/05/24 1028  midodrine tablet 10 mg         -- Oral As needed (PRN) 01/05/24 0928          Hyperglycemia/Diabetes Medications:   Antihyperglycemics (From admission, onward)      Start     Stop Route Frequency Ordered    01/02/24 1338  insulin aspart U-100 pen 0-5 Units         -- SubQ Every 4 hours PRN 01/02/24 1238

## 2024-01-06 NOTE — SUBJECTIVE & OBJECTIVE
Interval History:   Stepped up to ICU due to hypotension, HD yesterday.    Review of patient's allergies indicates:   Allergen Reactions    Crestor [rosuvastatin] Swelling    Gluten protein      Current Facility-Administered Medications   Medication Frequency    acetaminophen tablet 650 mg Q4H PRN    acetaminophen tablet 650 mg Q8H    amiodarone 360 mg/200 mL (1.8 mg/mL) infusion Continuous    [START ON 1/20/2024] amiodarone tablet 200 mg Daily    amiodarone tablet 400 mg BID    atorvastatin tablet 40 mg QHS    bisacodyL suppository 10 mg Daily PRN    dextrose 10% bolus 125 mL 125 mL PRN    dextrose 10% bolus 250 mL 250 mL PRN    glucagon (human recombinant) injection 1 mg PRN    glucose chewable tablet 16 g PRN    glucose chewable tablet 24 g PRN    heparin (porcine) injection 5,000 Units Q8H    insulin aspart U-100 pen 0-5 Units Q4H PRN    lactated ringers bolus 500 mL Once    LIDOcaine 5 % patch 1 patch Q24H    methocarbamoL tablet 500 mg BID    metoprolol injection 5 mg Q5 Min PRN    midodrine tablet 10 mg PRN    midodrine tablet 15 mg TID WM    mupirocin 2 % ointment BID    NORepinephrine 4 mg in dextrose 5% 250 mL infusion (premix) Continuous    ondansetron disintegrating tablet 8 mg Q8H PRN    oxyCODONE immediate release tablet 5 mg Q4H PRN    oxyCODONE immediate release tablet Tab 10 mg Q4H PRN    pantoprazole EC tablet 40 mg Daily    polyethylene glycol packet 17 g Daily    pregabalin capsule 75 mg Daily    senna-docusate 8.6-50 mg per tablet 1 tablet BID    sevelamer carbonate tablet 1,600 mg TID WM    simethicone chewable tablet 80 mg TID PRN    torsemide tablet 40 mg Daily       Objective:     Vital Signs (Most Recent):  Temp: 98.6 °F (37 °C) (01/06/24 1101)  Pulse: 75 (01/06/24 1215)  Resp: 17 (01/06/24 1215)  BP: (!) 78/50 (01/06/24 1200)  SpO2: (!) 92 % (01/06/24 1215) Vital Signs (24h Range):  Temp:  [98.6 °F (37 °C)-99.7 °F (37.6 °C)] 98.6 °F (37 °C)  Pulse:  [] 75  Resp:  [12-39] 17  SpO2:   [90 %-100 %] 92 %  BP: ()/(36-63) 78/50  Arterial Line BP: ()/(34-59) 88/38     Weight: 69.5 kg (153 lb 3.5 oz) (01/06/24 0600)  Body mass index is 25.5 kg/m².  Body surface area is 1.79 meters squared.    I/O last 3 completed shifts:  In: 2425.5 [P.O.:80; I.V.:377.7; Other:1000; IV Piggyback:967.9]  Out: 2110 [Other:1990; Chest Tube:120]     Physical Exam  Pulmonary:      Effort: Pulmonary effort is normal.   Abdominal:      Palpations: Abdomen is soft.   Musculoskeletal:         General: No swelling.      Right lower leg: No edema.      Left lower leg: No edema.   Skin:     General: Skin is warm.   Neurological:      General: No focal deficit present.      Mental Status: She is alert and oriented to person, place, and time.   Psychiatric:         Mood and Affect: Mood normal.         Behavior: Behavior normal.          Significant Labs:  BMP:   Recent Labs   Lab 01/06/24  0303   *  138*   *  132*   K 4.9  4.9   CL 97  97   CO2 22*  22*   BUN 28*  28*   CREATININE 4.1*  4.1*   CALCIUM 8.8  8.8   MG 2.0     CBC:   Recent Labs   Lab 01/06/24  0303   WBC 13.00*   RBC 2.59*   HGB 7.7*   HCT 22.3*      MCV 86   MCH 29.7   MCHC 34.5        Significant Imaging:  Labs: Reviewed

## 2024-01-06 NOTE — ASSESSMENT & PLAN NOTE
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 Stepped up to the unit for persistent afib rvr.       Neuro/Psych:   -- Sedation: none  -- Pain:  Tylenol, Robaxin, Lyrica, Oxy 5s and 10s PRN             Cards:   -- HDS  -- BP goals SBP >90, MAPs >55  -- AFib RVR, with stable blood pressure is currently.  Tachycardic to the 140s, has now converted to NSR   - cardiology consulted, appreciate assistance.   - Amio gtt.  - If BP stable for 24h will start Toprol 12.5mg qd.    - Will start eliquis 5mg BID pending normal CBC.      Pulm:   -- Goal O2 sat > 90%  -- currently on RA      Renal:  -- history of ESRD, dialysis dependent.  Monday Wednesday Friday schedule while in the hospital.  -- trend BUN/Cr   -- patient is anuric  Recent Labs   Lab 01/05/24  0508 01/05/24  1345 01/06/24  0303   BUN 50* 17 28*  28*   CREATININE 6.5* 3.2* 4.1*  4.1*           FEN / GI:   -- Replace lytes as needed  -- Nutrition:  Renal diet, Dietician consult placed.  -- MiraLax      ID:   -- Tm: afebrile; WBC within normal limits  Recent Labs   Lab 01/03/24  0605 01/05/24  1345 01/06/24  0303   WBC 9.06 10.88 13.00*       -- Abx none      Heme/Onc:  -- recheck hemoglobin  -- Daily CBC  Recent Labs   Lab 01/03/24  0605 01/05/24  1345 01/06/24  0303   HGB 11.5* 9.1* 7.7*    167 182           Endo:   -- history of diabetes  -- Gluc goal 140-180  -- SSI      PPx:   Feeding:  Renal diet  Analgesia/Sedation:  Pain is controlled, no sedation was  Thromboembolic prevention: hep q8  HOB >30: yes  Stress Ulcer ppx: n/a  Glucose control: Critical care goal 140-180 g/dl, SSI  Bowel reg: miralax  Invasive Lines/Drains/Airway: trialysis line  Deescalation: n/a        Dispo/Code Status/Palliative:   -- SICU / Full Code

## 2024-01-06 NOTE — ASSESSMENT & PLAN NOTE
BG goal: 140-180    - Novolog Low dose correction with ISF 50 starting at 150 prn ac/hs   - POCT Glucose before meals and at bedtime  - Hypoglycemia protocol in place      ** Please notify Endocrine for any change and/or advance in diet**  ** Please call Endocrine for any BG related issues **     Discharge Planning:   TBD. Please notify endocrinology prior to discharge.

## 2024-01-06 NOTE — PROGRESS NOTES
[facility]  Endocrinology  Progress Note    Admit Date: 2024     Reason for Consult: Management of T2DM, Hyperglycemia     Surgical Procedure and Date: 24--   LYMPHADENECTOMY (Right)  THORACOTOMY (Right)  THORACOTOMY, WITH INITIAL THERAPEUTIC WEDGE RESECTION OF LUNG (Right)    Diabetes diagnosis year: in her 50s    Home Diabetes Medications:    LDC SSI   Blood sugar 150 to 200, + 1 unit  Blood sugar 201 to 250, + 2 units  Blood sugar 251 to 300, + 3 units  Blood sugar 301 to 350, + 4 units   Blood sugar greater than 350, + 5 units    How often checking glucose at home? >4 x day   BG readings on regimen: 100-120s  Hypoglycemia on the regimen?  denies   Missed doses on regimen?  No    Diabetes Complications include:     Nephropathy     Complicating diabetes co morbidities:   ESRD and Glucocorticoid use       HPI:   Patient is a 73 y.o. female with DDD, benign essential tremor, meniere's disease, HTN, HLD, DM2, GERD, Celiac's, IBS who is found to have RML Carcinoid tumor.  Chest CT 10/18/23 revealed 1.2 x 1.1 cm right middle lobe nodule, previously 1.2 x 1.0 cm in 2023. Underwent Robotic Bronchoscopy 10/20/23; path: RML nodule positive for well-differentiated neuroendocrine neoplasm, carcinoid tumor. PET/CT Cu64 23 showed known RML nodule with hypermetabolic activity. Now presents s/p thoracotomy with wedge resection of right lung. Endocrine consulted for BG management.         Interval HPI:   Overnight events: No acute events overnight. Patient in room 20623 CVICU/53844 CVICU A. Blood glucose stable. BG at goal on current insulin regimen (SSI ). Steroid use- None. 4 Days Post-Op  Renal function- Abnormal - Creatinine 4.1   Vasopressors-  Norepinephrine       Endocrine will continue to follow and manage insulin orders inpatient.         Diet diabetic Renal, Gluten Free, Soft & Bite Sized (IDDSI Level 6); 2000 Calorie     Eatin%  Nausea: No  Hypoglycemia and  "intervention: No  Fever: No  TPN and/or TF: No      BP (!) 86/52   Pulse 90   Temp 98.8 °F (37.1 °C) (Oral)   Resp (!) 39   Ht 5' 5" (1.651 m)   Wt 69.5 kg (153 lb 3.5 oz)   LMP  (LMP Unknown) Comment: BEAU/ NO BSO  1986  SpO2 (!) 93%   Breastfeeding No   BMI 25.50 kg/m²     Labs Reviewed and Include    Recent Labs   Lab 01/06/24  0303   *  138*   CALCIUM 8.8  8.8   ALBUMIN 2.4*  2.4*   PROT 6.0   *  132*   K 4.9  4.9   CO2 22*  22*   CL 97  97   BUN 28*  28*   CREATININE 4.1*  4.1*   ALKPHOS 83   ALT <5*   AST 20   BILITOT 0.6     Lab Results   Component Value Date    WBC 13.00 (H) 01/06/2024    HGB 7.7 (L) 01/06/2024    HCT 22.3 (L) 01/06/2024    MCV 86 01/06/2024     01/06/2024     No results for input(s): "TSH", "FREET4" in the last 168 hours.  Lab Results   Component Value Date    HGBA1C 4.9 11/15/2023       Nutritional status:   Body mass index is 25.5 kg/m².  Lab Results   Component Value Date    ALBUMIN 2.4 (L) 01/06/2024    ALBUMIN 2.4 (L) 01/06/2024    ALBUMIN 3.0 (L) 01/05/2024     No results found for: "PREALBUMIN"    Estimated Creatinine Clearance: 12 mL/min (A) (based on SCr of 4.1 mg/dL (H)).    Accu-Checks  Recent Labs     01/04/24  1117 01/04/24  1644 01/04/24  1933 01/04/24  2338 01/05/24  0502 01/05/24  0724 01/05/24  1706 01/05/24  2007 01/05/24  2319 01/06/24  0359   POCTGLUCOSE 194* 193* 131* 143* 113* 108 185* 171* 202* 128*       Current Medications and/or Treatments Impacting Glycemic Control  Immunotherapy:    Immunosuppressants       None          Steroids:   Hormones (From admission, onward)      None          Pressors:    Autonomic Drugs (From admission, onward)      Start     Stop Route Frequency Ordered    01/06/24 0745  midodrine tablet 15 mg         -- Oral 3 times daily with meals 01/05/24 8677    01/05/24 2345  NORepinephrine 4 mg in dextrose 5% 250 mL infusion (premix)        Question Answer Comment   Begin at (in mcg/kg/min): 0.02    Titrate " by: (in mcg/kg/min) 0.02    Titrate interval: (in minutes) 5    Titrate to maintain: (MAP or SBP) MAP    Greater than: (in mmHg) 65    Maximum dose: (in mcg/kg/min) 3        -- IV Continuous 01/05/24 2233 01/05/24 1028  midodrine tablet 10 mg         -- Oral As needed (PRN) 01/05/24 0928          Hyperglycemia/Diabetes Medications:   Antihyperglycemics (From admission, onward)      Start     Stop Route Frequency Ordered    01/02/24 1338  insulin aspart U-100 pen 0-5 Units         -- SubQ Every 4 hours PRN 01/02/24 1238            ASSESSMENT and PLAN    Renal/  ESRD (end stage renal disease)    Titrate insulin slowly to avoid hypoglycemia as the risk of hypoglycemia increases with decreased creatinine clearance.      Oncology  * Malignant carcinoid tumor of bronchus  Managed per primary team        Endocrine  Type 2 diabetes mellitus with diabetic polyneuropathy, without long-term current use of insulin  BG goal: 140-180    - Novolog Low dose correction with ISF 50 starting at 150 prn ac/hs   - POCT Glucose before meals and at bedtime  - Hypoglycemia protocol in place      ** Please notify Endocrine for any change and/or advance in diet**  ** Please call Endocrine for any BG related issues **     Discharge Planning:   TBD. Please notify endocrinology prior to discharge.             Pranav Perry, DNP, FNP  Endocrinology  [facility] - [facility]

## 2024-01-06 NOTE — PLAN OF CARE
Chart reviewed this morning. Patient on levophed overnight but now off all pressors. She is on midodrine. In sinus rhythm since roughly 7 PM last night on telemetry review:    Plan:  Recommend starting Eliquis 5 mg BID for anticoagulation  Switch Amiodarone gtt to PO Amiodarone 400 mg BID x2 weeks then 200 mg qd thereafter  If BP stable for 24 hrs, start low dose metoprolol succinate 12.5 mg qd   If BP ok on low dose metoprolol, can add Valsartan 20 mg BID  Continue torsemide 40 mg qd  Follow up with Cardiology as outpatient    Cardiology will sign off.    Samir Rock MD  [facility] PGY-6      Staff attestation to follow.

## 2024-01-06 NOTE — NURSING
Pt remains hypotensive with BP's 70s/40s, Map in the 50s. MD Sergey notified. 500cc LR bolus administered providing no change. 5MG midodrine administered as ordered. WCTM.

## 2024-01-06 NOTE — PROGRESS NOTES
[facility]  Thoracic Surgery  Progress Note    Subjective:     History of Present Illness:  No notes on file    Post-Op Info:  Procedure(s) (LRB):  LYMPHADENECTOMY (Right)  THORACOTOMY (Right)  THORACOTOMY, WITH INITIAL THERAPEUTIC WEDGE RESECTION OF LUNG (Right)   4 Days Post-Op     Interval History: stepped up to ICU yesterday for afib RVR; HR controlled with amiodarone; hypotensive overnight requiring arterial line and levophed briefly   Seen by cardiology  Hgb downtrended into this am; cxr clear, CT output slightly bloody but low  Up to the chair     Medications:  Continuous Infusions:   amiodarone in dextrose 5% 1 mg/min (01/05/24 2200)    amiodarone in dextrose 5% 0.5 mg/min (01/06/24 0942)    NORepinephrine bitartrate-D5W Stopped (01/06/24 0406)     Scheduled Meds:   acetaminophen  650 mg Oral Q8H    atorvastatin  40 mg Oral QHS    heparin (porcine)  5,000 Units Subcutaneous Q8H    LIDOcaine  1 patch Transdermal Q24H    methocarbamoL  500 mg Oral BID    metoprolol tartrate  25 mg Oral BID    midodrine  15 mg Oral TID WM    mupirocin   Nasal BID    pantoprazole  40 mg Oral Daily    polyethylene glycol  17 g Oral Daily    pregabalin  75 mg Oral Daily    senna-docusate 8.6-50 mg  1 tablet Oral BID    sevelamer carbonate  1,600 mg Oral TID WM    torsemide  40 mg Oral Daily     PRN Meds:acetaminophen, bisacodyL, dextrose 10%, dextrose 10%, glucagon (human recombinant), glucose, glucose, insulin aspart U-100, metoprolol, midodrine, ondansetron, oxyCODONE, oxyCODONE, simethicone     Review of patient's allergies indicates:   Allergen Reactions    Crestor [rosuvastatin] Swelling    Gluten protein      Objective:     Vital Signs (Most Recent):  Temp: 98.8 °F (37.1 °C) (01/06/24 0701)  Pulse: 90 (01/06/24 0900)  Resp: (!) 39 (01/06/24 0900)  BP: (!) 86/52 (01/06/24 0900)  SpO2: (!) 93 % (01/06/24 0900) Vital Signs (24h Range):  Temp:  [98.8 °F (37.1 °C)-99.7 °F (37.6 °C)] 98.8 °F (37.1  °C)  Pulse:  [] 90  Resp:  [12-39] 39  SpO2:  [91 %-100 %] 93 %  BP: ()/(36-63) 86/52  Arterial Line BP: ()/(43-59) 117/55     Intake/Output - Last 3 Shifts         01/04 0700  01/05 0659 01/05 0700  01/06 0659 01/06 0700 01/07 0659    P.O. 80      I.V. (mL/kg)  377.7 (5.4) 49.9 (0.7)    Other  1000     IV Piggyback  967.9     Total Intake(mL/kg) 80 (1.2) 2345.5 (33.7) 49.9 (0.7)    Other  1990     Stool 0      Chest Tube 235 10 50    Total Output 235 2000 50    Net -155 +345.5 -0.2           Stool Occurrence 0 x              SpO2: (!) 93 %       Physical Exam  Vitals and nursing note reviewed.   Constitutional:       Appearance: Normal appearance.   HENT:      Head: Normocephalic and atraumatic.   Cardiovascular:      Rate and Rhythm: Normal rate and regular rhythm.      Comments: Right radial a line   Pulmonary:      Effort: Pulmonary effort is normal. No respiratory distress.      Comments: Right posterior chest incision c/d/I  Chest port sites c/d/I  Chest tube in place with serosanguinous output, no air leak   Abdominal:      General: Abdomen is flat. There is no distension.      Palpations: Abdomen is soft. There is no mass.      Tenderness: There is no abdominal tenderness.      Hernia: No hernia is present.   Musculoskeletal:      Right lower leg: No edema.      Left lower leg: No edema.   Skin:     General: Skin is warm and dry.   Neurological:      General: No focal deficit present.      Mental Status: She is alert and oriented to person, place, and time.   Psychiatric:         Mood and Affect: Mood normal.         Behavior: Behavior normal.            Significant Labs:  BMP:   Recent Labs   Lab 01/06/24  0303   *  138*   *  132*   K 4.9  4.9   CL 97  97   CO2 22*  22*   BUN 28*  28*   CREATININE 4.1*  4.1*   CALCIUM 8.8  8.8   MG 2.0       CBC:   Recent Labs   Lab 01/06/24  0303   WBC 13.00*   RBC 2.59*   HGB 7.7*   HCT 22.3*      MCV 86   MCH 29.7   MCHC  34.5         Significant Diagnostics:  CT: I have reviewed all pertinent results/findings within the past 24 hours    VTE Risk Mitigation (From admission, onward)           Ordered     heparin (porcine) injection 1,000 Units  As needed (PRN)         01/03/24 1123     heparin (porcine) injection 5,000 Units  Every 8 hours         01/02/24 1226     IP VTE HIGH RISK PATIENT  Once         01/02/24 1226     Place CARMELITA hose  Until discontinued         01/02/24 1226     Place sequential compression device  Until discontinued         01/02/24 1226                  Assessment/Plan:     * Malignant carcinoid tumor of bronchus  72 yo female with ESRD (TTS), DDD, benign essential tremor, meniere's disease, HTN, HLD, DM2, GERD, Celiac's, IBS and 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 Stepped up to the ICU 1/5 with afib RVR refractory to IV metoprolol, started on amio gtt with conversion.     - CT to [place]  - Daily CXR   - Cards consult, now on Amio gtt and PO metoprolol with heart rate controlled  - Trend troponins until peak   - Repeat EKG to confirm she is in sinus   - Repeat CBC at 2 pm  - If Hgb stable, likely okay to start AC per cards  - Continue bowel reg; add enema/suppository today   - Renal, gluten free diet  - Nephrology following, appreciate assistance. HD 1/5/2023  - Daily RFP   - MM pain control  - Home meds reviewed, restarted as appropriate   - PT/OT  - DVT ppx, SQH         Kat Flores MD  Thoracic Surgery  [facility]

## 2024-01-06 NOTE — SUBJECTIVE & OBJECTIVE
Interval History: stepped up to ICU yesterday for afib RVR; HR controlled with amiodarone; hypotensive overnight requiring arterial line and levophed briefly   Seen by cardiology  Hgb downtrended into this am; cxr clear, CT output slightly bloody but low  Up to the chair     Medications:  Continuous Infusions:   amiodarone in dextrose 5% 1 mg/min (01/05/24 2200)    amiodarone in dextrose 5% 0.5 mg/min (01/06/24 0942)    NORepinephrine bitartrate-D5W Stopped (01/06/24 0406)     Scheduled Meds:   acetaminophen  650 mg Oral Q8H    atorvastatin  40 mg Oral QHS    heparin (porcine)  5,000 Units Subcutaneous Q8H    LIDOcaine  1 patch Transdermal Q24H    methocarbamoL  500 mg Oral BID    metoprolol tartrate  25 mg Oral BID    midodrine  15 mg Oral TID WM    mupirocin   Nasal BID    pantoprazole  40 mg Oral Daily    polyethylene glycol  17 g Oral Daily    pregabalin  75 mg Oral Daily    senna-docusate 8.6-50 mg  1 tablet Oral BID    sevelamer carbonate  1,600 mg Oral TID WM    torsemide  40 mg Oral Daily     PRN Meds:acetaminophen, bisacodyL, dextrose 10%, dextrose 10%, glucagon (human recombinant), glucose, glucose, insulin aspart U-100, metoprolol, midodrine, ondansetron, oxyCODONE, oxyCODONE, simethicone     Review of patient's allergies indicates:   Allergen Reactions    Crestor [rosuvastatin] Swelling    Gluten protein      Objective:     Vital Signs (Most Recent):  Temp: 98.8 °F (37.1 °C) (01/06/24 0701)  Pulse: 90 (01/06/24 0900)  Resp: (!) 39 (01/06/24 0900)  BP: (!) 86/52 (01/06/24 0900)  SpO2: (!) 93 % (01/06/24 0900) Vital Signs (24h Range):  Temp:  [98.8 °F (37.1 °C)-99.7 °F (37.6 °C)] 98.8 °F (37.1 °C)  Pulse:  [] 90  Resp:  [12-39] 39  SpO2:  [91 %-100 %] 93 %  BP: ()/(36-63) 86/52  Arterial Line BP: ()/(43-59) 117/55     Intake/Output - Last 3 Shifts         01/04 0700  01/05 0659 01/05 0700 01/06 0659 01/06 0700  01/07 0659    P.O. 80      I.V. (mL/kg)  377.7 (5.4) 49.9 (0.7)    Other   1000     IV Piggyback  967.9     Total Intake(mL/kg) 80 (1.2) 2345.5 (33.7) 49.9 (0.7)    Other  1990     Stool 0      Chest Tube 235 10 50    Total Output 235 2000 50    Net -155 +345.5 -0.2           Stool Occurrence 0 x              SpO2: (!) 93 %        Physical Exam  Vitals and nursing note reviewed.   Constitutional:       Appearance: Normal appearance.   HENT:      Head: Normocephalic and atraumatic.   Cardiovascular:      Rate and Rhythm: Normal rate and regular rhythm.      Comments: Right radial a line   Pulmonary:      Effort: Pulmonary effort is normal. No respiratory distress.      Comments: Right posterior chest incision c/d/I  Chest port sites c/d/I  Chest tube in place with serosanguinous output, no air leak   Abdominal:      General: Abdomen is flat. There is no distension.      Palpations: Abdomen is soft. There is no mass.      Tenderness: There is no abdominal tenderness.      Hernia: No hernia is present.   Musculoskeletal:      Right lower leg: No edema.      Left lower leg: No edema.   Skin:     General: Skin is warm and dry.   Neurological:      General: No focal deficit present.      Mental Status: She is alert and oriented to person, place, and time.   Psychiatric:         Mood and Affect: Mood normal.         Behavior: Behavior normal.            Significant Labs:  BMP:   Recent Labs   Lab 01/06/24  0303   *  138*   *  132*   K 4.9  4.9   CL 97  97   CO2 22*  22*   BUN 28*  28*   CREATININE 4.1*  4.1*   CALCIUM 8.8  8.8   MG 2.0       CBC:   Recent Labs   Lab 01/06/24  0303   WBC 13.00*   RBC 2.59*   HGB 7.7*   HCT 22.3*      MCV 86   MCH 29.7   MCHC 34.5         Significant Diagnostics:  CT: I have reviewed all pertinent results/findings within the past 24 hours    VTE Risk Mitigation (From admission, onward)           Ordered     heparin (porcine) injection 1,000 Units  As needed (PRN)         01/03/24 1123     heparin (porcine) injection 5,000 Units  Every 8  hours         01/02/24 1226     IP VTE HIGH RISK PATIENT  Once         01/02/24 1226     Place CARMELITA hose  Until discontinued         01/02/24 1226     Place sequential compression device  Until discontinued         01/02/24 1226

## 2024-01-06 NOTE — PROGRESS NOTES
[facility]  Critical Care - Surgery  Progress Note    Patient Name: Lily Green  MRN: [id number]  Admission Date: 1/2/2024  Hospital Length of Stay: 4 days  Code Status: Full Code  Attending Provider: Tan Thomson MD  Primary Care Provider: Pavel Calixto MD   Principal Problem: Malignant carcinoid tumor of bronchus    Subjective:       Interval History/Significant Events: Hypotensive overnight. Briefly required levophed. Arterial line placed. Cardiology consulted for troponin rise. Ordered EKG and echo. Cardiology state trop rise is likely due to demand ischemia. Trending trops    Follow-up For: Procedure(s) (LRB):  LYMPHADENECTOMY (Right)  THORACOTOMY (Right)  THORACOTOMY, WITH INITIAL THERAPEUTIC WEDGE RESECTION OF LUNG (Right)    Post-Operative Day: 4 Days Post-Op    Objective:     Vital Signs (Most Recent):  Temp: 98.6 °F (37 °C) (01/06/24 1101)  Pulse: 78 (01/06/24 1415)  Resp: (!) 26 (01/06/24 1415)  BP: (!) 86/55 (01/06/24 1300)  SpO2: 96 % (01/06/24 1415) Vital Signs (24h Range):  Temp:  [98.6 °F (37 °C)-99 °F (37.2 °C)] 98.6 °F (37 °C)  Pulse:  [] 78  Resp:  [12-39] 26  SpO2:  [90 %-100 %] 96 %  BP: ()/(36-63) 86/55  Arterial Line BP: ()/(34-59) 106/44     Weight: 69.4 kg (153 lb)  Body mass index is 25.46 kg/m².      Intake/Output Summary (Last 24 hours) at 1/6/2024 1438  Last data filed at 1/6/2024 1301  Gross per 24 hour   Intake 1788.81 ml   Output 90 ml   Net 1698.81 ml          Physical Exam  Constitutional:       Appearance: Normal appearance.   Cardiovascular:      Rate and Rhythm: Tachycardia present. Rhythm irregular.      Pulses: Normal pulses.      Comments: Arterial line in place  Pulmonary:      Effort: Pulmonary effort is normal.   Abdominal:      General: Abdomen is flat. There is no distension.      Palpations: Abdomen is soft.      Tenderness: There is no abdominal tenderness.   Skin:     General: Skin is warm and dry.    Neurological:      General: No focal deficit present.      Mental Status: She is alert.            Vents:       Lines/Drains/Airways       Central Venous Catheter Line  Duration                  Hemodialysis Catheter 01/25/23 1501 right internal jugular 345 days              Drain  Duration                  Chest Tube 01/02/24 1130 Tube - 1 Right Pleural 28 Fr. 4 days              Arterial Line  Duration             Arterial Line 01/05/24 2305 Right Radial <1 day              Peripheral Intravenous Line  Duration                  Hemodialysis AV Fistula 09/01/23 0800 Left forearm 127 days         Peripheral IV - Single Lumen 01/04/24 1821 Right;Lateral Wrist 1 day         Peripheral IV - Single Lumen 01/05/24 1546 20 G;1 3/4 in Right Forearm <1 day                    Significant Labs:    CBC/Anemia Profile:  Recent Labs   Lab 01/05/24  1345 01/06/24  0303   WBC 10.88 13.00*   HGB 9.1* 7.7*   HCT 27.4* 22.3*    182   MCV 93 86   RDW 15.3* 15.4*        Chemistries:  Recent Labs   Lab 01/05/24  0508 01/05/24  1345 01/06/24  0303   * 137 132*  132*   K 4.9 4.4 4.9  4.9   CL 93* 102 97  97   CO2 25 21* 22*  22*   BUN 50* 17 28*  28*   CREATININE 6.5* 3.2* 4.1*  4.1*   CALCIUM 9.2 9.1 8.8  8.8   ALBUMIN 2.8* 3.0* 2.4*  2.4*   PROT  --  7.1 6.0   BILITOT  --  1.1* 0.6   ALKPHOS  --  95 83   ALT  --  <5* <5*   AST  --  23 20   MG  --  1.9 2.0   PHOS 4.5 2.9 4.4  4.4       All pertinent labs within the past 24 hours have been reviewed.    Significant Imaging:  I have reviewed all pertinent imaging results/findings within the past 24 hours.    Assessment/Plan:     Cardiac/Vascular  Atrial fibrillation with rapid ventricular response  72 yo female with 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 Stepped up to the unit for persistent afib rvr.       Neuro/Psych:   -- Sedation: none  -- Pain:  Tylenol, Robaxin, Lyrica, Oxy 5s and  10s PRN             Cards:   -- HDS  -- BP goals SBP >90, MAPs >55  -- AFib RVR, with stable blood pressure is currently.  Tachycardic to the 140s, has now converted to NSR   - cardiology consulted, appreciate assistance.   - Amio gtt.  - If BP stable for 24h will start Toprol 12.5mg qd.    - Will start eliquis 5mg BID pending normal CBC.      Pulm:   -- Goal O2 sat > 90%  -- currently on RA      Renal:  -- history of ESRD, dialysis dependent.  Monday Wednesday Friday schedule while in the hospital.  -- trend BUN/Cr   -- patient is anuric  Recent Labs   Lab 01/05/24  0508 01/05/24  1345 01/06/24  0303   BUN 50* 17 28*  28*   CREATININE 6.5* 3.2* 4.1*  4.1*           FEN / GI:   -- Replace lytes as needed  -- Nutrition:  Renal diet, Dietician consult placed.  -- MiraLax      ID:   -- Tm: afebrile; WBC within normal limits  Recent Labs   Lab 01/03/24  0605 01/05/24  1345 01/06/24  0303   WBC 9.06 10.88 13.00*       -- Abx none      Heme/Onc:  -- recheck hemoglobin  -- Daily CBC  Recent Labs   Lab 01/03/24  0605 01/05/24  1345 01/06/24  0303   HGB 11.5* 9.1* 7.7*    167 182           Endo:   -- history of diabetes  -- Gluc goal 140-180  -- SSI      PPx:   Feeding:  Renal diet  Analgesia/Sedation:  Pain is controlled, no sedation was  Thromboembolic prevention: hep q8  HOB >30: yes  Stress Ulcer ppx: n/a  Glucose control: Critical care goal 140-180 g/dl, SSI  Bowel reg: miralax  Invasive Lines/Drains/Airway: trialysis line  Deescalation: n/a        Dispo/Code Status/Palliative:   -- SICU / Full Code          Critical care was time spent personally by me on the following activities: development of treatment plan with patient or surrogate and bedside caregivers, discussions with consultants, evaluation of patient's response to treatment, examination of patient, ordering and performing treatments and interventions, ordering and review of laboratory studies, ordering and review of radiographic studies, pulse  oximetry, re-evaluation of patient's condition.  This critical care time did not overlap with that of any other provider or involve time for any procedures.     Prabhu Esqueda MD  Critical Care - Surgery  [facility]

## 2024-01-06 NOTE — ASSESSMENT & PLAN NOTE
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 Stepped up to the ICU 1/5 with afib RVR refractory to IV metoprolol, started on amio gtt with conversion.     - CT to [place]  - Daily CXR   - Cards consult, now on Amio gtt and PO metoprolol with heart rate controlled  - Trend troponins until peak   - Repeat EKG to confirm she is in sinus   - Repeat CBC at 2 pm  - If Hgb stable, likely okay to start AC per cards  - Continue bowel reg; add enema/suppository today   - Renal, gluten free diet  - Nephrology following, appreciate assistance. HD 1/5/2023  - Daily RFP   - MM pain control  - Home meds reviewed, restarted as appropriate   - PT/OT  - DVT ppx, SQH

## 2024-01-06 NOTE — NURSING
Pt remained hypotensive. MD Sergey and MD Doug called to bedside. Levophed gtt initiated. Emely placed. 5MG midodrine administered. WCTM.

## 2024-01-06 NOTE — PROCEDURES
"Lily Green is a 73 y.o. female patient.    Temp: 99 °F (37.2 °C) (24)  Pulse: 71 (24)  Resp: 14 (24)  BP: (!) 72/38 (24)  SpO2: 96 % (24)  Weight: 64.6 kg (142 lb 6.7 oz) (24 06)  Height: 5' 5" (165.1 cm) (24 1527)       Arterial Line    Date/Time: 2024 11:14 PM  Location procedure was performed: [facility] ([facility])    Performed by: Ernesto Camacho MD  Authorized by: Ernesto Camacho MD  Assisting provider: Shae Chris RN  Pre-op Diagnosis: Hypotension  Post-operative diagnosis: Hypotension  Consent Done: Yes  Consent: Written consent obtained.  Risks and benefits: risks, benefits and alternatives were discussed  Consent given by: patient  Patient understanding: patient states understanding of the procedure being performed  Patient consent: the patient's understanding of the procedure matches consent given  Procedure consent: procedure consent matches procedure scheduled  Relevant documents: relevant documents present and verified  Test results: test results available and properly labeled  Site marked: the operative site was marked  Imaging studies: imaging studies available  Patient identity confirmed: , MRN, name, provided demographic data and verbally with patient  Time out: Immediately prior to procedure a "time out" was called to verify the correct patient, procedure, equipment, support staff and site/side marked as required.  Preparation: Patient was prepped and draped in the usual sterile fashion.  Indications: hemodynamic monitoring  Location: right radial    Anesthesia:  Local Anesthetic: lidocaine 1% without epinephrine  Anesthetic total: 0.5 mL    Patient sedated: no  Description of findings: Pulsatile radial artery identified and needle advanced until flash obtain. Guidewire & catheter threaded w/o difficulty and w/ continuous arterial return.   Needle gauge: 20  Number of attempts: 1  Technical " procedures used: US  Significant surgical tasks conducted by the assistant(s): Aid in securing line & handing materials  Complications: No  Estimated blood loss (mL): 1  Specimens: No  Implants: No  Post-procedure: dressing applied  Post-procedure CMS: normal  Patient tolerance: Patient tolerated the procedure well with no immediate complications          1/5/2024

## 2024-01-07 LAB
ALBUMIN SERPL BCP-MCNC: 2.4 G/DL (ref 3.5–5.2)
ALBUMIN SERPL BCP-MCNC: 2.4 G/DL (ref 3.5–5.2)
ALP SERPL-CCNC: 171 U/L (ref 55–135)
ALT SERPL W/O P-5'-P-CCNC: <5 U/L (ref 10–44)
ANION GAP SERPL CALC-SCNC: 20 MMOL/L (ref 8–16)
ANION GAP SERPL CALC-SCNC: 20 MMOL/L (ref 8–16)
ANION GAP SERPL CALC-SCNC: 22 MMOL/L (ref 8–16)
AST SERPL-CCNC: 31 U/L (ref 10–40)
BASOPHILS # BLD AUTO: 0.01 K/UL (ref 0–0.2)
BASOPHILS NFR BLD: 0.1 % (ref 0–1.9)
BILIRUB SERPL-MCNC: 1.5 MG/DL (ref 0.1–1)
BUN SERPL-MCNC: 47 MG/DL (ref 8–23)
BUN SERPL-MCNC: 47 MG/DL (ref 8–23)
BUN SERPL-MCNC: 49 MG/DL (ref 8–23)
CALCIUM SERPL-MCNC: 9.2 MG/DL (ref 8.7–10.5)
CALCIUM SERPL-MCNC: 9.4 MG/DL (ref 8.7–10.5)
CALCIUM SERPL-MCNC: 9.4 MG/DL (ref 8.7–10.5)
CHLORIDE SERPL-SCNC: 89 MMOL/L (ref 95–110)
CHLORIDE SERPL-SCNC: 89 MMOL/L (ref 95–110)
CHLORIDE SERPL-SCNC: 90 MMOL/L (ref 95–110)
CO2 SERPL-SCNC: 22 MMOL/L (ref 23–29)
CO2 SERPL-SCNC: 23 MMOL/L (ref 23–29)
CO2 SERPL-SCNC: 23 MMOL/L (ref 23–29)
CREAT SERPL-MCNC: 5.9 MG/DL (ref 0.5–1.4)
DIFFERENTIAL METHOD BLD: ABNORMAL
EOSINOPHIL # BLD AUTO: 0 K/UL (ref 0–0.5)
EOSINOPHIL NFR BLD: 0.1 % (ref 0–8)
ERYTHROCYTE [DISTWIDTH] IN BLOOD BY AUTOMATED COUNT: 15.2 % (ref 11.5–14.5)
EST. GFR  (NO RACE VARIABLE): 7.1 ML/MIN/1.73 M^2
GLUCOSE SERPL-MCNC: 114 MG/DL (ref 70–110)
GLUCOSE SERPL-MCNC: 114 MG/DL (ref 70–110)
GLUCOSE SERPL-MCNC: 143 MG/DL (ref 70–110)
HCT VFR BLD AUTO: 25.4 % (ref 37–48.5)
HGB BLD-MCNC: 8.4 G/DL (ref 12–16)
IMM GRANULOCYTES # BLD AUTO: 0.04 K/UL (ref 0–0.04)
IMM GRANULOCYTES NFR BLD AUTO: 0.5 % (ref 0–0.5)
LYMPHOCYTES # BLD AUTO: 0.6 K/UL (ref 1–4.8)
LYMPHOCYTES NFR BLD: 8.3 % (ref 18–48)
MAGNESIUM SERPL-MCNC: 2.2 MG/DL (ref 1.6–2.6)
MCH RBC QN AUTO: 30.3 PG (ref 27–31)
MCHC RBC AUTO-ENTMCNC: 33.1 G/DL (ref 32–36)
MCV RBC AUTO: 92 FL (ref 82–98)
MONOCYTES # BLD AUTO: 1.5 K/UL (ref 0.3–1)
MONOCYTES NFR BLD: 19.9 % (ref 4–15)
NEUTROPHILS # BLD AUTO: 5.5 K/UL (ref 1.8–7.7)
NEUTROPHILS NFR BLD: 71.1 % (ref 38–73)
NRBC BLD-RTO: 0 /100 WBC
PHOSPHATE SERPL-MCNC: 6.7 MG/DL (ref 2.7–4.5)
PHOSPHATE SERPL-MCNC: 6.7 MG/DL (ref 2.7–4.5)
PLATELET # BLD AUTO: 244 K/UL (ref 150–450)
PMV BLD AUTO: 11.6 FL (ref 9.2–12.9)
POCT GLUCOSE: 123 MG/DL (ref 70–110)
POCT GLUCOSE: 137 MG/DL (ref 70–110)
POCT GLUCOSE: 149 MG/DL (ref 70–110)
POCT GLUCOSE: 187 MG/DL (ref 70–110)
POTASSIUM SERPL-SCNC: 4.9 MMOL/L (ref 3.5–5.1)
PROT SERPL-MCNC: 6.6 G/DL (ref 6–8.4)
RBC # BLD AUTO: 2.77 M/UL (ref 4–5.4)
SODIUM SERPL-SCNC: 132 MMOL/L (ref 136–145)
SODIUM SERPL-SCNC: 132 MMOL/L (ref 136–145)
SODIUM SERPL-SCNC: 134 MMOL/L (ref 136–145)
WBC # BLD AUTO: 7.69 K/UL (ref 3.9–12.7)

## 2024-01-07 PROCEDURE — 99233 SBSQ HOSP IP/OBS HIGH 50: CPT | Mod: ,,, | Performed by: INTERNAL MEDICINE

## 2024-01-07 PROCEDURE — 83735 ASSAY OF MAGNESIUM: CPT

## 2024-01-07 PROCEDURE — 63600175 PHARM REV CODE 636 W HCPCS: Performed by: PHYSICIAN ASSISTANT

## 2024-01-07 PROCEDURE — 25000003 PHARM REV CODE 250

## 2024-01-07 PROCEDURE — 99900035 HC TECH TIME PER 15 MIN (STAT)

## 2024-01-07 PROCEDURE — 99232 SBSQ HOSP IP/OBS MODERATE 35: CPT | Mod: GC,,, | Performed by: ANESTHESIOLOGY

## 2024-01-07 PROCEDURE — 99232 SBSQ HOSP IP/OBS MODERATE 35: CPT | Mod: ,,, | Performed by: NURSE PRACTITIONER

## 2024-01-07 PROCEDURE — 94761 N-INVAS EAR/PLS OXIMETRY MLT: CPT

## 2024-01-07 PROCEDURE — 25000003 PHARM REV CODE 250: Performed by: SURGERY

## 2024-01-07 PROCEDURE — 80053 COMPREHEN METABOLIC PANEL: CPT

## 2024-01-07 PROCEDURE — 80069 RENAL FUNCTION PANEL: CPT | Performed by: SURGERY

## 2024-01-07 PROCEDURE — 63600175 PHARM REV CODE 636 W HCPCS

## 2024-01-07 PROCEDURE — 85025 COMPLETE CBC W/AUTO DIFF WBC: CPT

## 2024-01-07 PROCEDURE — 80048 BASIC METABOLIC PNL TOTAL CA: CPT | Mod: XB

## 2024-01-07 PROCEDURE — 25000003 PHARM REV CODE 250: Performed by: PHYSICIAN ASSISTANT

## 2024-01-07 PROCEDURE — 11000001 HC ACUTE MED/SURG PRIVATE ROOM

## 2024-01-07 RX ORDER — SYRING-NEEDL,DISP,INSUL,0.3 ML 29 G X1/2"
296 SYRINGE, EMPTY DISPOSABLE MISCELLANEOUS ONCE
Status: COMPLETED | OUTPATIENT
Start: 2024-01-07 | End: 2024-01-07

## 2024-01-07 RX ORDER — DIGOXIN 0.25 MG/ML
125 INJECTION INTRAMUSCULAR; INTRAVENOUS ONCE
Status: COMPLETED | OUTPATIENT
Start: 2024-01-08 | End: 2024-01-08

## 2024-01-07 RX ORDER — HEPARIN SODIUM,PORCINE/D5W 25000/250
0-40 INTRAVENOUS SOLUTION INTRAVENOUS CONTINUOUS
Status: DISCONTINUED | OUTPATIENT
Start: 2024-01-07 | End: 2024-01-07

## 2024-01-07 RX ORDER — POLYETHYLENE GLYCOL 3350 17 G/17G
17 POWDER, FOR SOLUTION ORAL 2 TIMES DAILY
Status: DISCONTINUED | OUTPATIENT
Start: 2024-01-07 | End: 2024-01-08

## 2024-01-07 RX ORDER — ALUMINUM HYDROXIDE, MAGNESIUM HYDROXIDE, AND SIMETHICONE 2400; 240; 2400 MG/30ML; MG/30ML; MG/30ML
30 SUSPENSION ORAL EVERY 6 HOURS PRN
Status: DISCONTINUED | OUTPATIENT
Start: 2024-01-07 | End: 2024-01-08

## 2024-01-07 RX ORDER — DIGOXIN 0.25 MG/ML
250 INJECTION INTRAMUSCULAR; INTRAVENOUS ONCE
Status: COMPLETED | OUTPATIENT
Start: 2024-01-07 | End: 2024-01-07

## 2024-01-07 RX ADMIN — AMIODARONE HYDROCHLORIDE 400 MG: 200 TABLET ORAL at 08:01

## 2024-01-07 RX ADMIN — APIXABAN 5 MG: 2.5 TABLET, FILM COATED ORAL at 08:01

## 2024-01-07 RX ADMIN — SODIUM CHLORIDE 500 ML: 9 INJECTION, SOLUTION INTRAVENOUS at 02:01

## 2024-01-07 RX ADMIN — SEVELAMER CARBONATE 1600 MG: 800 TABLET, FILM COATED ORAL at 12:01

## 2024-01-07 RX ADMIN — MUPIROCIN: 20 OINTMENT TOPICAL at 08:01

## 2024-01-07 RX ADMIN — DIGOXIN 250 MCG: 0.25 INJECTION INTRAMUSCULAR; INTRAVENOUS at 08:01

## 2024-01-07 RX ADMIN — METHOCARBAMOL 500 MG: 500 TABLET ORAL at 09:01

## 2024-01-07 RX ADMIN — HEPARIN SODIUM 5000 UNITS: 5000 INJECTION INTRAVENOUS; SUBCUTANEOUS at 06:01

## 2024-01-07 RX ADMIN — MIDODRINE HYDROCHLORIDE 15 MG: 5 TABLET ORAL at 12:01

## 2024-01-07 RX ADMIN — SENNOSIDES AND DOCUSATE SODIUM 1 TABLET: 8.6; 5 TABLET ORAL at 08:01

## 2024-01-07 RX ADMIN — METOROPROLOL TARTRATE 5 MG: 5 INJECTION, SOLUTION INTRAVENOUS at 02:01

## 2024-01-07 RX ADMIN — APIXABAN 5 MG: 2.5 TABLET, FILM COATED ORAL at 09:01

## 2024-01-07 RX ADMIN — PREGABALIN 75 MG: 75 CAPSULE ORAL at 04:01

## 2024-01-07 RX ADMIN — POLYETHYLENE GLYCOL 3350 17 G: 17 POWDER, FOR SOLUTION ORAL at 09:01

## 2024-01-07 RX ADMIN — MIDODRINE HYDROCHLORIDE 15 MG: 5 TABLET ORAL at 08:01

## 2024-01-07 RX ADMIN — OXYCODONE HYDROCHLORIDE 5 MG: 5 TABLET ORAL at 10:01

## 2024-01-07 RX ADMIN — SODIUM CHLORIDE 500 ML: 9 INJECTION, SOLUTION INTRAVENOUS at 09:01

## 2024-01-07 RX ADMIN — LIDOCAINE 5% 1 PATCH: 700 PATCH TOPICAL at 09:01

## 2024-01-07 RX ADMIN — PANTOPRAZOLE SODIUM 40 MG: 40 TABLET, DELAYED RELEASE ORAL at 09:01

## 2024-01-07 RX ADMIN — METHOCARBAMOL 500 MG: 500 TABLET ORAL at 08:01

## 2024-01-07 RX ADMIN — SIMETHICONE 80 MG: 80 TABLET, CHEWABLE ORAL at 02:01

## 2024-01-07 RX ADMIN — MAGNESIUM CITRATE 296 ML: 1.75 LIQUID ORAL at 08:01

## 2024-01-07 RX ADMIN — TORSEMIDE 40 MG: 20 TABLET ORAL at 09:01

## 2024-01-07 RX ADMIN — MIDODRINE HYDROCHLORIDE 15 MG: 5 TABLET ORAL at 04:01

## 2024-01-07 RX ADMIN — ALUMINUM HYDROXIDE, MAGNESIUM HYDROXIDE, AND DIMETHICONE 30 ML: 400; 400; 40 SUSPENSION ORAL at 02:01

## 2024-01-07 RX ADMIN — ACETAMINOPHEN 650 MG: 325 TABLET ORAL at 09:01

## 2024-01-07 RX ADMIN — ACETAMINOPHEN 650 MG: 325 TABLET ORAL at 02:01

## 2024-01-07 RX ADMIN — ENEMA 1 ENEMA: 19; 7 ENEMA RECTAL at 09:01

## 2024-01-07 RX ADMIN — ATORVASTATIN CALCIUM 40 MG: 40 TABLET, FILM COATED ORAL at 08:01

## 2024-01-07 RX ADMIN — SEVELAMER CARBONATE 1600 MG: 800 TABLET, FILM COATED ORAL at 04:01

## 2024-01-07 RX ADMIN — SENNOSIDES AND DOCUSATE SODIUM 1 TABLET: 8.6; 5 TABLET ORAL at 09:01

## 2024-01-07 RX ADMIN — AMIODARONE HYDROCHLORIDE 400 MG: 200 TABLET ORAL at 09:01

## 2024-01-07 RX ADMIN — MUPIROCIN: 20 OINTMENT TOPICAL at 09:01

## 2024-01-07 RX ADMIN — SIMETHICONE 80 MG: 80 TABLET, CHEWABLE ORAL at 06:01

## 2024-01-07 RX ADMIN — ACETAMINOPHEN 650 MG: 325 TABLET ORAL at 06:01

## 2024-01-07 NOTE — PLAN OF CARE
Recommendations    1. Continue 2000 Calorie Diabetic/Gluten-Free/Renal; Soft and Bite-sized (IDDSI Level 6) Diet, fluid per MD.   2. Recommend Novasource Renal QD.   3. Appetite stimulant may be beneficial.   4. RD to monitor and follow-up.    Goals: Meet % EEN/EPN by follow-up date.  Nutrition Goal Status: new  Communication of RD Recs: other (comment) (POC)

## 2024-01-07 NOTE — ASSESSMENT & PLAN NOTE
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 On HD ~ 1 year. Last HD prior to presentation 1/1/24. Pt has NELY AVF placed on 10/23 that has not been cleared by vascular. Currently using RIJ TDC.     Nephrology History  iHD Schedule: TTS   Unit/MD: [facility]/ Dr. Vyas  Duration: 3.5 hours   UF:   EDW: 58 kg   Access: RIJ TDC  Residual Renal Function: minimal     Assessment:   - Last HD 01/05, no need of HD today, will assess her tomorrow for further session.  - midodrine as needed with HD   - continue torsemide   - continue sevelamer 800 TIDWM  - Hgb goal 10-11, continue epo with HD   - Renal diet, if not NPO    -pre and post HD weight   - Continue to monitor intake and output, daily weights   - Avoid nephrotoxic medication and renal dose medications to GFR

## 2024-01-07 NOTE — PLAN OF CARE
"SICU PLAN OF CARE NOTE    Dx: Malignant carcinoid tumor of bronchus    Goals of Care:  MAP >55, SBP>90    Vital Signs:  BP (!) 104/53   Pulse 83   Temp 98.8 °F (37.1 °C) (Oral)   Resp (!) 24   Ht 5' 5" (1.651 m)   Wt 69.4 kg (153 lb)   LMP  (LMP Unknown) Comment: BEAU/ NO BSO  1986  SpO2 (!) 94%   Breastfeeding No   BMI 25.46 kg/m²     Cardiac:  NSR (ST elevation)    Resp:  SpO2 94-97% on R.A    Neuro:  AAO x4, Follows Commands, and Moves All Extremities    Urine Output:  0. Patient on HD    Drains: R Chest Tube, total output 80 cc /  shift    Diet:  Dental Soft-chopped, renal, 1500 FR     Labs/Accuchecks:  accuchecks Q4    Skin:  All skin remains free from injury.  Patient up in chair during the shift. Ambulating down the fernandez. Turns in bed independently. ICU bed working correctly.    Shift Events:  See flowsheet for further assessment/details.  Family updated on current condition/plan of care, questions answered, and emotional support provided.  MD updated on current condition, vitals, labs, and gtts.  No new orders received, will continue to monitor.    "

## 2024-01-07 NOTE — SUBJECTIVE & OBJECTIVE
Interval History:   No acute events overnight, /55, on room air, labs are non emergent.  Will assess her tomorrow for dialysis as her regular schedule.    Review of patient's allergies indicates:   Allergen Reactions    Crestor [rosuvastatin] Swelling    Gluten protein      Current Facility-Administered Medications   Medication Frequency    acetaminophen tablet 650 mg Q4H PRN    acetaminophen tablet 650 mg Q8H    [START ON 1/20/2024] amiodarone tablet 200 mg Daily    amiodarone tablet 400 mg BID    apixaban tablet 5 mg BID    atorvastatin tablet 40 mg QHS    bisacodyL suppository 10 mg Daily PRN    dextrose 10% bolus 125 mL 125 mL PRN    dextrose 10% bolus 250 mL 250 mL PRN    glucagon (human recombinant) injection 1 mg PRN    glucose chewable tablet 16 g PRN    glucose chewable tablet 24 g PRN    insulin aspart U-100 pen 0-5 Units Q4H PRN    LIDOcaine 5 % patch 1 patch Q24H    methocarbamoL tablet 500 mg BID    metoprolol injection 5 mg Q5 Min PRN    midodrine tablet 10 mg PRN    midodrine tablet 15 mg TID WM    mupirocin 2 % ointment BID    ondansetron injection 8 mg Q8H PRN    oxyCODONE immediate release tablet 5 mg Q4H PRN    oxyCODONE immediate release tablet Tab 10 mg Q4H PRN    pantoprazole EC tablet 40 mg Daily    polyethylene glycol packet 17 g BID    pregabalin capsule 75 mg Daily    senna-docusate 8.6-50 mg per tablet 1 tablet BID    sevelamer carbonate tablet 1,600 mg TID WM    simethicone chewable tablet 80 mg TID PRN    torsemide tablet 40 mg Daily       Objective:     Vital Signs (Most Recent):  Temp: 98 °F (36.7 °C) (01/07/24 1101)  Pulse: 91 (01/07/24 1206)  Resp: (!) 28 (01/07/24 1206)  BP: (!) 125/58 (01/07/24 1206)  SpO2: (!) 93 % (01/07/24 1206) Vital Signs (24h Range):  Temp:  [98 °F (36.7 °C)-98.8 °F (37.1 °C)] 98 °F (36.7 °C)  Pulse:  [76-97] 91  Resp:  [15-42] 28  SpO2:  [85 %-98 %] 93 %  BP: ()/(50-71) 125/58  Arterial Line BP: ()/(38-66) 147/61     Weight: 68.5 kg (151 lb  0.2 oz) (01/07/24 0600)  Body mass index is 25.13 kg/m².  Body surface area is 1.77 meters squared.    I/O last 3 completed shifts:  In: 1897 [P.O.:440; I.V.:469.1; IV Piggyback:988]  Out: 90 [Chest Tube:90]     Physical Exam  Constitutional:       Appearance: Normal appearance.   Cardiovascular:      Rate and Rhythm: Normal rate and regular rhythm.      Pulses: Normal pulses.      Comments: Arterial line in place  Pulmonary:      Effort: Pulmonary effort is normal.   Abdominal:      General: Abdomen is flat. There is no distension.      Palpations: Abdomen is soft.      Tenderness: There is no abdominal tenderness.   Skin:     General: Skin is warm and dry.   Neurological:      General: No focal deficit present.      Mental Status: She is alert.          Significant Labs:  BMP:   Recent Labs   Lab 01/07/24  0320 01/07/24  1132   *  114* 143*   *  132* 134*   K 4.9  4.9 4.9   CL 89*  89* 90*   CO2 23  23 22*   BUN 47*  47* 49*   CREATININE 5.9*  5.9* 5.9*   CALCIUM 9.4  9.4 9.2   MG 2.2  --      CBC:   Recent Labs   Lab 01/07/24  0320   WBC 7.69   RBC 2.77*   HGB 8.4*   HCT 25.4*      MCV 92   MCH 30.3   MCHC 33.1        Significant Imaging:  Labs: Reviewed

## 2024-01-07 NOTE — PROGRESS NOTES
[facility]  Nephrology  Progress Note    Patient Name: Lily Green  MRN: [id number]  Admission Date: 1/2/2024  Hospital Length of Stay: 5 days  Attending Provider: Tan Thomson MD   Primary Care Physician: Pavel Calixto MD  Principal Problem:Malignant carcinoid tumor of bronchus    Subjective:     HPI: Patient is a 73 y.o. 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 S/p thoracotomy. Last HD prior to presentation 1/1/24. Nephrology consulted for ESRD on HD.        Interval History:   No acute events overnight, /55, on room air, labs are non emergent.  Will assess her tomorrow for dialysis as her regular schedule.    Review of patient's allergies indicates:   Allergen Reactions    Crestor [rosuvastatin] Swelling    Gluten protein      Current Facility-Administered Medications   Medication Frequency    acetaminophen tablet 650 mg Q4H PRN    acetaminophen tablet 650 mg Q8H    [START ON 1/20/2024] amiodarone tablet 200 mg Daily    amiodarone tablet 400 mg BID    apixaban tablet 5 mg BID    atorvastatin tablet 40 mg QHS    bisacodyL suppository 10 mg Daily PRN    dextrose 10% bolus 125 mL 125 mL PRN    dextrose 10% bolus 250 mL 250 mL PRN    glucagon (human recombinant) injection 1 mg PRN    glucose chewable tablet 16 g PRN    glucose chewable tablet 24 g PRN    insulin aspart U-100 pen 0-5 Units Q4H PRN    LIDOcaine 5 % patch 1 patch Q24H    methocarbamoL tablet 500 mg BID    metoprolol injection 5 mg Q5 Min PRN    midodrine tablet 10 mg PRN    midodrine tablet 15 mg TID WM    mupirocin 2 % ointment BID    ondansetron injection 8 mg Q8H PRN    oxyCODONE immediate release tablet 5 mg Q4H PRN    oxyCODONE immediate release tablet Tab 10 mg Q4H PRN    pantoprazole EC tablet 40 mg Daily    polyethylene glycol packet 17 g BID    pregabalin capsule 75 mg Daily     senna-docusate 8.6-50 mg per tablet 1 tablet BID    sevelamer carbonate tablet 1,600 mg TID WM    simethicone chewable tablet 80 mg TID PRN    torsemide tablet 40 mg Daily       Objective:     Vital Signs (Most Recent):  Temp: 98 °F (36.7 °C) (01/07/24 1101)  Pulse: 91 (01/07/24 1206)  Resp: (!) 28 (01/07/24 1206)  BP: (!) 125/58 (01/07/24 1206)  SpO2: (!) 93 % (01/07/24 1206) Vital Signs (24h Range):  Temp:  [98 °F (36.7 °C)-98.8 °F (37.1 °C)] 98 °F (36.7 °C)  Pulse:  [76-97] 91  Resp:  [15-42] 28  SpO2:  [85 %-98 %] 93 %  BP: ()/(50-71) 125/58  Arterial Line BP: ()/(38-66) 147/61     Weight: 68.5 kg (151 lb 0.2 oz) (01/07/24 0600)  Body mass index is 25.13 kg/m².  Body surface area is 1.77 meters squared.    I/O last 3 completed shifts:  In: 1897 [P.O.:440; I.V.:469.1; IV Piggyback:988]  Out: 90 [Chest Tube:90]     Physical Exam  Constitutional:       Appearance: Normal appearance.   Cardiovascular:      Rate and Rhythm: Normal rate and regular rhythm.      Pulses: Normal pulses.      Comments: Arterial line in place  Pulmonary:      Effort: Pulmonary effort is normal.   Abdominal:      General: Abdomen is flat. There is no distension.      Palpations: Abdomen is soft.      Tenderness: There is no abdominal tenderness.   Skin:     General: Skin is warm and dry.   Neurological:      General: No focal deficit present.      Mental Status: She is alert.          Significant Labs:  BMP:   Recent Labs   Lab 01/07/24  0320 01/07/24  1132   *  114* 143*   *  132* 134*   K 4.9  4.9 4.9   CL 89*  89* 90*   CO2 23  23 22*   BUN 47*  47* 49*   CREATININE 5.9*  5.9* 5.9*   CALCIUM 9.4  9.4 9.2   MG 2.2  --      CBC:   Recent Labs   Lab 01/07/24  0320   WBC 7.69   RBC 2.77*   HGB 8.4*   HCT 25.4*      MCV 92   MCH 30.3   MCHC 33.1        Significant Imaging:  Labs: Reviewed  Assessment/Plan:     Renal/  ESRD (end stage renal disease)  S/p R thoracotomy due to RML carcinoid tumor. On  HD ~ 1 year. Last HD prior to presentation 1/1/24. Pt has NELY AVF placed on 10/23 that has not been cleared by vascular. Currently using RIJ TDC.     Nephrology History  iHD Schedule: TTS   Unit/MD: [facility]/ Dr. Vyas  Duration: 3.5 hours   UF:   EDW: 58 kg   Access: RIJ TDC  Residual Renal Function: minimal     Assessment:   - Last HD 01/05, no need of HD today, will assess her tomorrow for further session.  - midodrine as needed with HD   - continue torsemide   - continue sevelamer 800 TIDWM  - Hgb goal 10-11, continue epo with HD   - Renal diet, if not NPO    -pre and post HD weight   - Continue to monitor intake and output, daily weights   - Avoid nephrotoxic medication and renal dose medications to GFR                      Oncology  * Malignant carcinoid tumor of bronchus  -management per primary         Thank you for your consult. I will follow-up with patient. Please contact us if you have any additional questions.    Hanane Ortega MD  Nephrology  [facility]

## 2024-01-07 NOTE — ASSESSMENT & PLAN NOTE
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 Stepped up to the unit for persistent afib rvr.       Neuro/Psych:   -- Sedation: none  -- Pain:  Tylenol, Robaxin, Lyrica, Oxy 5s and 10s PRN             Cards:   -- HDS  -- BP goals SBP >90, MAPs >55  --  stepped up for AFib RVR, with stable blood pressure w/ tachycardia to the 140s, has now converted to NSR   - cardiology consulted, appreciate assistance.   - Amio gtt transition to PO and d/c'd  - If BP stable for 24h will start Toprol 12.5mg qd.    - Eliquis 5mg BID       Pulm:   -- Goal O2 sat > 90%  -- currently on RA      Renal:  -- history of ESRD, dialysis dependent.  Monday Wednesday Friday schedule while in the hospital.  -- trend BUN/Cr   -- patient is anuric  Recent Labs   Lab 01/05/24  1345 01/06/24  0303 01/07/24  0320   BUN 17 28*  28* 47*  47*   CREATININE 3.2* 4.1*  4.1* 5.9*  5.9*           FEN / GI:   -- Replace lytes as needed  -- Nutrition:  Renal diet, Dietician consult placed.  -- MiraLax, docusate, suppository, enema      ID:   -- Tm: afebrile; WBC within normal limits  Recent Labs   Lab 01/06/24  0303 01/06/24  1442 01/07/24  0320   WBC 13.00* 8.99 7.69       -- Abx none      Heme/Onc:  -- recheck hemoglobin  -- Daily CBC  Recent Labs   Lab 01/06/24  0303 01/06/24  1442 01/07/24  0320   HGB 7.7* 8.1* 8.4*    214 244           Endo:   -- history of diabetes  -- Gluc goal 140-180  -- SSI      PPx:   Feeding:  Renal diet  Analgesia/Sedation:  Pain is controlled, no sedation was  Thromboembolic prevention: hep q8  HOB >30: yes  Stress Ulcer ppx: n/a  Glucose control: Critical care goal 140-180 g/dl, SSI  Bowel reg: miralax  Invasive Lines/Drains/Airway: trialysis line  Deescalation: n/a        Dispo/Code Status/Palliative:   -- step down / Full Code

## 2024-01-07 NOTE — PLAN OF CARE
"      SICU PLAN OF CARE NOTE    Dx: Malignant carcinoid tumor of bronchus    Shift Events: NAEON. Glycerin suppository given with little effect.    Goals of Care: MAP >55    Neuro: AAO x4, Follows Commands, and Moves All Extremities    Vital Signs: BP (!) 101/52 (BP Location: Right arm, Patient Position: Lying)   Pulse 82   Temp 98.2 °F (36.8 °C) (Oral)   Resp 20   Ht 5' 5" (1.651 m)   Wt 69.4 kg (153 lb)   LMP  (LMP Unknown) Comment: BEAU/ NO BSO  1986  SpO2 (!) 89%   Breastfeeding No   BMI 25.46 kg/m²     Cardiac:NSR    Respiratory: Room Air    Diet: Dental Soft and Diabetic Diet    Urine Output: Anuric 0 cc/shift    Drains: Chest Tube, total output 0 cc /  shift     Labs/Accuchecks: Daily labs/accuchecks q4.    Skin:   Bed plugged in with pads and foams in place. Pt andrea to weight shift and turn with minimal assist. R. Back incision noted upon assessment. See flowsheet for skin and line details.                  "

## 2024-01-07 NOTE — SUBJECTIVE & OBJECTIVE
Interval History:   NAEON. AF, HDS - has remained in sinus with rhythm control. Echo with normal function.   Hgb stable. CXR pending.   No HD yesterday.   Poor PO intake, no BM, she feels bad this am due to constipation. Increasing distention.      Medications:  Continuous Infusions:   heparin (porcine) in D5W       Scheduled Meds:   acetaminophen  650 mg Oral Q8H    [START ON 1/20/2024] amiodarone  200 mg Oral Daily    amiodarone  400 mg Oral BID    atorvastatin  40 mg Oral QHS    heparin (PORCINE)  60 Units/kg (Adjusted) Intravenous Once    LIDOcaine  1 patch Transdermal Q24H    magnesium citrate  296 mL Oral Once    methocarbamoL  500 mg Oral BID    midodrine  15 mg Oral TID WM    mupirocin   Nasal BID    pantoprazole  40 mg Oral Daily    polyethylene glycol  17 g Oral BID    pregabalin  75 mg Oral Daily    senna-docusate 8.6-50 mg  1 tablet Oral BID    sevelamer carbonate  1,600 mg Oral TID WM    sodium chloride 0.9%  500 mL Intravenous Once    torsemide  40 mg Oral Daily     PRN Meds:acetaminophen, bisacodyL, dextrose 10%, dextrose 10%, glucagon (human recombinant), glucose, glucose, heparin (PORCINE), heparin (PORCINE), insulin aspart U-100, metoprolol, midodrine, ondansetron, oxyCODONE, oxyCODONE, simethicone     Review of patient's allergies indicates:   Allergen Reactions    Crestor [rosuvastatin] Swelling    Gluten protein      Objective:     Vital Signs (Most Recent):  Temp: 98 °F (36.7 °C) (01/07/24 0701)  Pulse: 89 (01/07/24 0701)  Resp: 16 (01/07/24 0701)  BP: (!) 118/56 (01/07/24 0701)  SpO2: (!) 94 % (01/07/24 0701) Vital Signs (24h Range):  Temp:  [98 °F (36.7 °C)-98.8 °F (37.1 °C)] 98 °F (36.7 °C)  Pulse:  [73-92] 89  Resp:  [15-42] 16  SpO2:  [85 %-98 %] 94 %  BP: ()/(43-59) 118/56  Arterial Line BP: ()/(34-66) 108/53     Intake/Output - Last 3 Shifts         01/05 0700 01/06 0659 01/06 0700 01/07 0659 01/07 0700 01/08 0659    P.O.  440     I.V. (mL/kg) 377.7 (5.4) 174.7 (2.6)      Other 1000      IV Piggyback 967.9 494.1     Total Intake(mL/kg) 2345.5 (33.7) 1108.8 (16.2)     Other 1990      Stool       Chest Tube 10 80     Total Output 2000 80     Net +345.5 +1028.8            Stool Occurrence  1 x             SpO2: (!) 94 %        Physical Exam  Vitals and nursing note reviewed.   Constitutional:       Appearance: Normal appearance.   HENT:      Head: Normocephalic and atraumatic.   Cardiovascular:      Rate and Rhythm: Normal rate and regular rhythm.      Comments: Right radial a line   Pulmonary:      Effort: Pulmonary effort is normal. No respiratory distress.      Comments: Right posterior chest incision c/d/I  Chest port sites c/d/I  Chest tube in place with serosanguinous output, no air leak   Abdominal:      General: Abdomen is flat. There is distension.      Palpations: Abdomen is soft. There is no mass.      Tenderness: There is abdominal tenderness.      Hernia: No hernia is present.      Comments: Mildly tender to palpation diffusely, distended and tympanic   Musculoskeletal:      Right lower leg: No edema.      Left lower leg: No edema.   Skin:     General: Skin is warm and dry.   Neurological:      General: No focal deficit present.      Mental Status: She is alert and oriented to person, place, and time.   Psychiatric:         Mood and Affect: Mood normal.         Behavior: Behavior normal.            Significant Labs:  BMP:   Recent Labs   Lab 01/07/24  0320   *  114*   *  132*   K 4.9  4.9   CL 89*  89*   CO2 23  23   BUN 47*  47*   CREATININE 5.9*  5.9*   CALCIUM 9.4  9.4   MG 2.2       CBC:   Recent Labs   Lab 01/07/24  0320   WBC 7.69   RBC 2.77*   HGB 8.4*   HCT 25.4*      MCV 92   MCH 30.3   MCHC 33.1         Significant Diagnostics:  CT: I have reviewed all pertinent results/findings within the past 24 hours    VTE Risk Mitigation (From admission, onward)           Ordered     heparin 25,000 units in dextrose 5% (100 units/ml) IV bolus from  bag - ADDITIONAL PRN BOLUS - 60 units/kg  As needed (PRN)        Question:  Heparin Infusion Adjustment (DO NOT MODIFY ANSWER)  Answer:  [email or web address]\epic\Images\Pharmacy\HeparinInfusions\heparin LOW INTENSITY nomogram for [facility] [id number]    01/07/24 0753     heparin 25,000 units in dextrose 5% (100 units/ml) IV bolus from bag - ADDITIONAL PRN BOLUS - 30 units/kg  As needed (PRN)        Question:  Heparin Infusion Adjustment (DO NOT MODIFY ANSWER)  Answer:  [email or web address]\epic\Images\Pharmacy\HeparinInfusions\heparin LOW INTENSITY nomogram for [facility] [id number]    01/07/24 0753     heparin 25,000 units in dextrose 5% (100 units/ml) IV bolus from bag INITIAL BOLUS  Once        Question:  Heparin Infusion Adjustment (DO NOT MODIFY ANSWER)  Answer:  [email or web address]\epic\Images\Pharmacy\HeparinInfusions\heparin LOW INTENSITY nomogram for [facility] [id number]    01/07/24 0753     heparin 25,000 units in dextrose 5% 250 mL (100 units/mL) infusion LOW INTENSITY nomogram - [facility]  Continuous        Question:  Begin at (units/kg/hr)  Answer:  12    01/07/24 0753     heparin (porcine) injection 1,000 Units  As needed (PRN)         01/03/24 1123     IP VTE HIGH RISK PATIENT  Once         01/02/24 1226     Place CARMELITA hose  Until discontinued         01/02/24 1226     Place sequential compression device  Until discontinued         01/02/24 1226

## 2024-01-07 NOTE — PROGRESS NOTES
[facility]  Endocrinology  Progress Note    Admit Date: 2024     Reason for Consult: Management of T2DM, Hyperglycemia     Surgical Procedure and Date: 24--   LYMPHADENECTOMY (Right)  THORACOTOMY (Right)  THORACOTOMY, WITH INITIAL THERAPEUTIC WEDGE RESECTION OF LUNG (Right)    Diabetes diagnosis year: in her 50s    Home Diabetes Medications:    LDC SSI   Blood sugar 150 to 200, + 1 unit  Blood sugar 201 to 250, + 2 units  Blood sugar 251 to 300, + 3 units  Blood sugar 301 to 350, + 4 units   Blood sugar greater than 350, + 5 units    How often checking glucose at home? >4 x day   BG readings on regimen: 100-120s  Hypoglycemia on the regimen?  denies   Missed doses on regimen?  No    Diabetes Complications include:     Nephropathy     Complicating diabetes co morbidities:   ESRD and Glucocorticoid use       HPI:   Patient is a 73 y.o. female with DDD, benign essential tremor, meniere's disease, HTN, HLD, DM2, GERD, Celiac's, IBS who is found to have RML Carcinoid tumor.  Chest CT 10/18/23 revealed 1.2 x 1.1 cm right middle lobe nodule, previously 1.2 x 1.0 cm in 2023. Underwent Robotic Bronchoscopy 10/20/23; path: RML nodule positive for well-differentiated neuroendocrine neoplasm, carcinoid tumor. PET/CT Cu64 23 showed known RML nodule with hypermetabolic activity. Now presents s/p thoracotomy with wedge resection of right lung. Endocrine consulted for BG management.         Interval HPI:   Overnight events: No acute events overnight. Patient in room 55312 CVICU/71709 CVICU A. Blood glucose stable. BG at goal on current insulin regimen (SSI ). Steroid use- None. 5 Days Post-Op  Renal function- Abnormal - Creatinine 5.9   Vasopressors-  None       Endocrine will continue to follow and manage insulin orders inpatient.         Diet diabetic Renal, Gluten Free, Soft & Bite Sized (IDDSI Level 6), Low Potassium; 2000 Calorie; Fluid - 1500mL     Eatin%  Nausea:  "No  Hypoglycemia and intervention: No  Fever: No  TPN and/or TF: No      BP (!) 118/56   Pulse 86   Temp 98 °F (36.7 °C) (Oral)   Resp 19   Ht 5' 5" (1.651 m)   Wt 68.5 kg (151 lb 0.2 oz)   LMP  (LMP Unknown) Comment: BEAU/ NO BSO  1986  SpO2 (!) 94%   Breastfeeding No   BMI 25.13 kg/m²     Labs Reviewed and Include    Recent Labs   Lab 01/07/24  0320   *  114*   CALCIUM 9.4  9.4   ALBUMIN 2.4*  2.4*   PROT 6.6   *  132*   K 4.9  4.9   CO2 23  23   CL 89*  89*   BUN 47*  47*   CREATININE 5.9*  5.9*   ALKPHOS 171*   ALT <5*   AST 31   BILITOT 1.5*     Lab Results   Component Value Date    WBC 7.69 01/07/2024    HGB 8.4 (L) 01/07/2024    HCT 25.4 (L) 01/07/2024    MCV 92 01/07/2024     01/07/2024     No results for input(s): "TSH", "FREET4" in the last 168 hours.  Lab Results   Component Value Date    HGBA1C 4.9 11/15/2023       Nutritional status:   Body mass index is 25.13 kg/m².  Lab Results   Component Value Date    ALBUMIN 2.4 (L) 01/07/2024    ALBUMIN 2.4 (L) 01/07/2024    ALBUMIN 2.4 (L) 01/06/2024    ALBUMIN 2.4 (L) 01/06/2024     No results found for: "PREALBUMIN"    Estimated Creatinine Clearance: 8.3 mL/min (A) (based on SCr of 5.9 mg/dL (H)).    Accu-Checks  Recent Labs     01/05/24 2007 01/05/24  2319 01/06/24  0359 01/06/24  0803 01/06/24  1105 01/06/24  1550 01/06/24 2018 01/06/24  2315 01/07/24  0319 01/07/24  0753   POCTGLUCOSE 171* 202* 128* 115* 173* 122* 127* 139* 137* 123*       Current Medications and/or Treatments Impacting Glycemic Control  Immunotherapy:    Immunosuppressants       None          Steroids:   Hormones (From admission, onward)      None          Pressors:    Autonomic Drugs (From admission, onward)      Start     Stop Route Frequency Ordered    01/06/24 0745  midodrine tablet 15 mg         -- Oral 3 times daily with meals 01/05/24 1030    01/05/24 1028  midodrine tablet 10 mg         -- Oral As needed (PRN) 01/05/24 0928      "     Hyperglycemia/Diabetes Medications:   Antihyperglycemics (From admission, onward)      Start     Stop Route Frequency Ordered    01/02/24 1338  insulin aspart U-100 pen 0-5 Units         -- SubQ Every 4 hours PRN 01/02/24 1238            ASSESSMENT and PLAN    Renal/  ESRD (end stage renal disease)    Titrate insulin slowly to avoid hypoglycemia as the risk of hypoglycemia increases with decreased creatinine clearance.      Oncology  * Malignant carcinoid tumor of bronchus  Managed per primary team        Endocrine  Type 2 diabetes mellitus with diabetic polyneuropathy, without long-term current use of insulin  BG goal: 140-180    - Novolog Low dose correction with ISF 50 starting at 150 prn ac/hs   - POCT Glucose before meals and at bedtime  - Hypoglycemia protocol in place      ** Please notify Endocrine for any change and/or advance in diet**  ** Please call Endocrine for any BG related issues **     Discharge Planning:   TBD. Please notify endocrinology prior to discharge.             Pranav Perry, DNP, FNP  Endocrinology  [facility]

## 2024-01-07 NOTE — SUBJECTIVE & OBJECTIVE
Interval History/Significant Events: NAEON. Walked yesterday. Complains of constipation. Belly distended, not rigid or painful. Suppository given overnight without BM. Plan for enema tonight. Trops downtrending.     Follow-up For: Procedure(s) (LRB):  LYMPHADENECTOMY (Right)  THORACOTOMY (Right)  THORACOTOMY, WITH INITIAL THERAPEUTIC WEDGE RESECTION OF LUNG (Right)    Post-Operative Day: 5 Days Post-Op    Objective:     Vital Signs (Most Recent):  Temp: 98 °F (36.7 °C) (01/07/24 0701)  Pulse: 90 (01/07/24 1015)  Resp: (!) 22 (01/07/24 1015)  BP: (!) 158/71 (01/07/24 1000)  SpO2: (!) 94 % (01/07/24 1015) Vital Signs (24h Range):  Temp:  [98 °F (36.7 °C)-98.8 °F (37.1 °C)] 98 °F (36.7 °C)  Pulse:  [73-94] 90  Resp:  [15-42] 22  SpO2:  [85 %-98 %] 94 %  BP: ()/(50-71) 158/71  Arterial Line BP: ()/(36-66) 138/59     Weight: 68.5 kg (151 lb 0.2 oz)  Body mass index is 25.13 kg/m².      Intake/Output Summary (Last 24 hours) at 1/7/2024 1032  Last data filed at 1/7/2024 0320  Gross per 24 hour   Intake 1042.19 ml   Output 0 ml   Net 1042.19 ml          Physical Exam  Constitutional:       Appearance: Normal appearance.   Cardiovascular:      Rate and Rhythm: Normal rate and regular rhythm.      Pulses: Normal pulses.      Comments: Arterial line in place  Pulmonary:      Effort: Pulmonary effort is normal.   Abdominal:      General: Abdomen is flat. There is no distension.      Palpations: Abdomen is soft.      Tenderness: There is no abdominal tenderness.   Skin:     General: Skin is warm and dry.   Neurological:      General: No focal deficit present.      Mental Status: She is alert.            Vents:       Lines/Drains/Airways       Central Venous Catheter Line  Duration                  Hemodialysis Catheter 01/25/23 1501 right internal jugular 346 days              Drain  Duration                  Chest Tube 01/02/24 1130 Tube - 1 Right Pleural 28 Fr. 4 days              Arterial Line  Duration              Arterial Line 01/05/24 2305 Right Radial 1 day              Peripheral Intravenous Line  Duration                  Hemodialysis AV Fistula 09/01/23 0800 Left forearm 128 days         Peripheral IV - Single Lumen 01/04/24 1821 Right;Lateral Wrist 2 days         Peripheral IV - Single Lumen 01/05/24 1546 20 G;1 3/4 in Right Forearm 1 day                    Significant Labs:    CBC/Anemia Profile:  Recent Labs   Lab 01/06/24  0303 01/06/24  1442 01/07/24  0320   WBC 13.00* 8.99 7.69   HGB 7.7* 8.1* 8.4*   HCT 22.3* 24.4* 25.4*    214 244   MCV 86 91 92   RDW 15.4* 15.5* 15.2*        Chemistries:  Recent Labs   Lab 01/05/24  1345 01/06/24  0303 01/06/24  1442 01/07/24  0320    132*  132*  --  132*  132*   K 4.4 4.9  4.9 4.9 4.9  4.9    97  97  --  89*  89*   CO2 21* 22*  22*  --  23  23   BUN 17 28*  28*  --  47*  47*   CREATININE 3.2* 4.1*  4.1*  --  5.9*  5.9*   CALCIUM 9.1 8.8  8.8  --  9.4  9.4   ALBUMIN 3.0* 2.4*  2.4*  --  2.4*  2.4*   PROT 7.1 6.0  --  6.6   BILITOT 1.1* 0.6  --  1.5*   ALKPHOS 95 83  --  171*   ALT <5* <5*  --  <5*   AST 23 20  --  31   MG 1.9 2.0  --  2.2   PHOS 2.9 4.4  4.4  --  6.7*  6.7*       All pertinent labs within the past 24 hours have been reviewed.    Significant Imaging:  I have reviewed all pertinent imaging results/findings within the past 24 hours.

## 2024-01-07 NOTE — SUBJECTIVE & OBJECTIVE
"Interval HPI:   Overnight events: No acute events overnight. Patient in room 62891 CVICU/40718 CVICU A. Blood glucose stable. BG at goal on current insulin regimen (SSI ). Steroid use- None. 5 Days Post-Op  Renal function- Abnormal - Creatinine 5.9   Vasopressors-  None       Endocrine will continue to follow and manage insulin orders inpatient.         Diet diabetic Renal, Gluten Free, Soft & Bite Sized (IDDSI Level 6), Low Potassium; 2000 Calorie; Fluid - 1500mL     Eatin%  Nausea: No  Hypoglycemia and intervention: No  Fever: No  TPN and/or TF: No      BP (!) 118/56   Pulse 86   Temp 98 °F (36.7 °C) (Oral)   Resp 19   Ht 5' 5" (1.651 m)   Wt 68.5 kg (151 lb 0.2 oz)   LMP  (LMP Unknown) Comment: BEAU/ NO BSO    SpO2 (!) 94%   Breastfeeding No   BMI 25.13 kg/m²     Labs Reviewed and Include    Recent Labs   Lab 24  0320   *  114*   CALCIUM 9.4  9.4   ALBUMIN 2.4*  2.4*   PROT 6.6   *  132*   K 4.9  4.9   CO2 23  23   CL 89*  89*   BUN 47*  47*   CREATININE 5.9*  5.9*   ALKPHOS 171*   ALT <5*   AST 31   BILITOT 1.5*     Lab Results   Component Value Date    WBC 7.69 2024    HGB 8.4 (L) 2024    HCT 25.4 (L) 2024    MCV 92 2024     2024     No results for input(s): "TSH", "FREET4" in the last 168 hours.  Lab Results   Component Value Date    HGBA1C 4.9 11/15/2023       Nutritional status:   Body mass index is 25.13 kg/m².  Lab Results   Component Value Date    ALBUMIN 2.4 (L) 2024    ALBUMIN 2.4 (L) 2024    ALBUMIN 2.4 (L) 2024    ALBUMIN 2.4 (L) 2024     No results found for: "PREALBUMIN"    Estimated Creatinine Clearance: 8.3 mL/min (A) (based on SCr of 5.9 mg/dL (H)).    Accu-Checks  Recent Labs     24  2319 24  0359 24  0803 24  1105 24  1550 245 24  0319 24  0753   POCTGLUCOSE 171* 202* 128* 115* 173* 122* 127* 139* 137* 123* "       Current Medications and/or Treatments Impacting Glycemic Control  Immunotherapy:    Immunosuppressants       None          Steroids:   Hormones (From admission, onward)      None          Pressors:    Autonomic Drugs (From admission, onward)      Start     Stop Route Frequency Ordered    01/06/24 0745  midodrine tablet 15 mg         -- Oral 3 times daily with meals 01/05/24 3187    01/05/24 1028  midodrine tablet 10 mg         -- Oral As needed (PRN) 01/05/24 0928          Hyperglycemia/Diabetes Medications:   Antihyperglycemics (From admission, onward)      Start     Stop Route Frequency Ordered    01/02/24 1338  insulin aspart U-100 pen 0-5 Units         -- SubQ Every 4 hours PRN 01/02/24 1238

## 2024-01-07 NOTE — PROGRESS NOTES
[facility]  General Surgery  Progress Note    Subjective:     History of Present Illness:  No notes on file    Post-Op Info:  Procedure(s) (LRB):  LYMPHADENECTOMY (Right)  THORACOTOMY (Right)  THORACOTOMY, WITH INITIAL THERAPEUTIC WEDGE RESECTION OF LUNG (Right)   5 Days Post-Op     Interval History:   NAEON. AF, HDS - has remained in sinus with rhythm control. Echo with normal function.   Hgb stable. CXR pending.   No HD yesterday.   Poor PO intake, no BM, she feels bad this am due to constipation. Increasing distention.      Medications:  Continuous Infusions:   heparin (porcine) in D5W       Scheduled Meds:   acetaminophen  650 mg Oral Q8H    [START ON 1/20/2024] amiodarone  200 mg Oral Daily    amiodarone  400 mg Oral BID    atorvastatin  40 mg Oral QHS    heparin (PORCINE)  60 Units/kg (Adjusted) Intravenous Once    LIDOcaine  1 patch Transdermal Q24H    magnesium citrate  296 mL Oral Once    methocarbamoL  500 mg Oral BID    midodrine  15 mg Oral TID WM    mupirocin   Nasal BID    pantoprazole  40 mg Oral Daily    polyethylene glycol  17 g Oral BID    pregabalin  75 mg Oral Daily    senna-docusate 8.6-50 mg  1 tablet Oral BID    sevelamer carbonate  1,600 mg Oral TID WM    sodium chloride 0.9%  500 mL Intravenous Once    torsemide  40 mg Oral Daily     PRN Meds:acetaminophen, bisacodyL, dextrose 10%, dextrose 10%, glucagon (human recombinant), glucose, glucose, heparin (PORCINE), heparin (PORCINE), insulin aspart U-100, metoprolol, midodrine, ondansetron, oxyCODONE, oxyCODONE, simethicone     Review of patient's allergies indicates:   Allergen Reactions    Crestor [rosuvastatin] Swelling    Gluten protein      Objective:     Vital Signs (Most Recent):  Temp: 98 °F (36.7 °C) (01/07/24 0701)  Pulse: 89 (01/07/24 0701)  Resp: 16 (01/07/24 0701)  BP: (!) 118/56 (01/07/24 0701)  SpO2: (!) 94 % (01/07/24 0701) Vital Signs (24h Range):  Temp:  [98 °F (36.7 °C)-98.8 °F (37.1 °C)] 98 °F (36.7  °C)  Pulse:  [73-92] 89  Resp:  [15-42] 16  SpO2:  [85 %-98 %] 94 %  BP: ()/(43-59) 118/56  Arterial Line BP: ()/(34-66) 108/53     Intake/Output - Last 3 Shifts         01/05 0700  01/06 0659 01/06 0700  01/07 0659 01/07 0700 01/08 0659    P.O.  440     I.V. (mL/kg) 377.7 (5.4) 174.7 (2.6)     Other 1000      IV Piggyback 967.9 494.1     Total Intake(mL/kg) 2345.5 (33.7) 1108.8 (16.2)     Other 1990      Stool       Chest Tube 10 80     Total Output 2000 80     Net +345.5 +1028.8            Stool Occurrence  1 x             SpO2: (!) 94 %       Physical Exam  Vitals and nursing note reviewed.   Constitutional:       Appearance: Normal appearance.   HENT:      Head: Normocephalic and atraumatic.   Cardiovascular:      Rate and Rhythm: Normal rate and regular rhythm.      Comments: Right radial a line   Pulmonary:      Effort: Pulmonary effort is normal. No respiratory distress.      Comments: Right posterior chest incision c/d/I  Chest port sites c/d/I  Chest tube in place with serosanguinous output, no air leak   Abdominal:      General: Abdomen is flat. There is distension.      Palpations: Abdomen is soft. There is no mass.      Tenderness: There is abdominal tenderness.      Hernia: No hernia is present.      Comments: Mildly tender to palpation diffusely, distended and tympanic   Musculoskeletal:      Right lower leg: No edema.      Left lower leg: No edema.   Skin:     General: Skin is warm and dry.   Neurological:      General: No focal deficit present.      Mental Status: She is alert and oriented to person, place, and time.   Psychiatric:         Mood and Affect: Mood normal.         Behavior: Behavior normal.            Significant Labs:  BMP:   Recent Labs   Lab 01/07/24  0320   *  114*   *  132*   K 4.9  4.9   CL 89*  89*   CO2 23  23   BUN 47*  47*   CREATININE 5.9*  5.9*   CALCIUM 9.4  9.4   MG 2.2       CBC:   Recent Labs   Lab 01/07/24  0320   WBC 7.69   RBC 2.77*    HGB 8.4*   HCT 25.4*      MCV 92   MCH 30.3   MCHC 33.1         Significant Diagnostics:  CT: I have reviewed all pertinent results/findings within the past 24 hours        Assessment/Plan:     * Malignant carcinoid tumor of bronchus  72 yo female with ESRD (TTS), DDD, benign essential tremor, meniere's disease, HTN, HLD, DM2, GERD, Celiac's, IBS and 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 Stepped up to the ICU 1/5 with afib RVR refractory to IV metoprolol, started on amio gtt with conversion.      - CT to waterseal  - Daily CXR, FU this am   - Cards consult for refractory afib RVR, s/p chemical cardioversion  PO amio, metop.   Start eliquis this morning. Monitor for bleeding. If CT output remains stable, plan to DC tomorrow.   Echo 1/6 with normal function  - Okay to DC arterial line   - KUB this am due to distention  - poor oral intake with hypochloremia - 500 NS bolus this am.   - Continue bowel reg; add enema today as well as mag citrate from above.  - Renal, gluten free diet  - Nephrology following, appreciate assistance. HD 1/5/2023  - Daily RFP   - MM pain control  - Home meds reviewed, restarted as appropriate   - PT/OT  - DVT ppx    Okay for stepdown this afternoon         Kat Flores MD  General Surgery  [facility]

## 2024-01-07 NOTE — CONSULTS
"  [facility]  Adult Nutrition  Consult Note    SUMMARY     Recommendations    1. Continue 2000 Calorie Diabetic/Gluten-Free/Renal; Soft and Bite-sized (IDDSI Level 6) Diet, fluid per MD.   2. Recommend Novasource Renal QD.   3. Appetite stimulant may be beneficial.   4. RD to monitor and follow-up.    Goals: Meet % EEN/EPN by follow-up date.  Nutrition Goal Status: new  Communication of RD Recs: other (comment) (POC)    Assessment and Plan    Nutrition Problem  Inadequate oral intake    Related to (etiology):   Decreased ability to consume sufficient energy    Signs and Symptoms (as evidenced by):   Restrictive diet      Interventions/Recommendations (treatment strategy):  Collaboration of nutrition care with other providers    Nutrition Diagnosis Status:   New           Reason for Assessment    Reason For Assessment: consult  Diagnosis: cancer diagnosis/related complications (Malignant carcinoid tumor of bronchus)  Relevant Medical History: Celiac disease, Meniere disease, HTN, HLD, peptic ulcer, DM  Interdisciplinary Rounds: did not attend  General Information Comments: RD consulted for "profound diet restrictions." Patient preferences noted per chart. Does not appear malnourished. Yellowing of eyes noted. UBW ~130 lbs. Restrictive diet, SO has brought some items from home that patient likes.  Nutrition Discharge Planning: Pending Clinical course    Nutrition Risk Screen    Nutrition Risk Screen: no indicators present    Nutrition/Diet History    Patient Reported Diet/Restrictions/Preferences: gluten-free, diabetic diet, renal (Low potassium)  Food Preferences: No potatoes, grits, high potassium foods. Drink smoothies, eats strawberries, low sodium chicken broth, cabbage, carrots OK; Broiled fish/chicken  Spiritual, Cultural Beliefs, Mandaen Practices, Values that Affect Care: no  Food Allergies: wheat (gluten)  Factors Affecting Nutritional Intake: decreased " "appetite    Anthropometrics    Temp: 98 °F (36.7 °C)  Height Method: Stated  Height: 5' 5" (165.1 cm)  Height (inches): 65 in  Weight Method: Bed Scale  Weight: 68.5 kg (151 lb 0.2 oz)  Weight (lb): 151.02 lb  Ideal Body Weight (IBW), Female: 125 lb  % Ideal Body Weight, Female (lb): 122.4 %  BMI (Calculated): 25.1       Lab/Procedures/Meds    Pertinent Labs Reviewed: reviewed  Pertinent Labs Comments: H/H: 8.4/25.4, Na 132, Cl 89, BUn 47, Cr 5.9, GFR 7.1, Glu 114, P 6.7, , TBili 1.5, ALT <5  Pertinent Medications Reviewed: reviewed  Pertinent Medications Comments: amiodarone, atorvastatin, heparin, magnesium citrate, senna-docusate, polyethylene glycol, pantoprazole, torsemide, pregabalin      Estimated/Assessed Needs    Weight Used For Calorie Calculations: 68.5 kg (151 lb)  Energy Calorie Requirements (kcal): 8047-0624 kcal/d (25-35 kcal/kg)  Energy Need Method: Kcal/kg  Protein Requirements: 69-82 g/d (1.0-1.2 g/kg)  Weight Used For Protein Calculations: 68.5 kg (151 lb)        RDA Method (mL): 1712  CHO Requirement: 214-299 g      Nutrition Prescription Ordered    Current Diet Order: 2000 Calorie diabetic/ Gluten-free/Renal; Soft and Bite-Sized (IDDSI Level 6) Diet  Nutrition Order Comments: 1500 mL FR    Evaluation of Received Nutrient/Fluid Intake    Comments: LBM: 1/6  % Intake of Estimated Energy Needs: 75 - 100 %  % Meal Intake: 25 - 50 %    Nutrition Risk    Level of Risk/Frequency of Follow-up: moderate (1-2x/ week)       Monitor and Evaluation    Food and Nutrient Intake: energy intake, food and beverage intake  Food and Nutrient Adminstration: diet order  Knowledge/Beliefs/Attitudes: food and nutrition knowledge/skill, beliefs and attitudes  Physical Activity and Function: factors affecting access to physical activity, nutrition-related ADLs and IADLs  Anthropometric Measurements: body mass index, weight change, weight  Biochemical Data, Medical Tests and Procedures: inflammatory profile, " glucose/endocrine profile, gastrointestinal profile, electrolyte and renal panel, lipid profile  Nutrition-Focused Physical Findings: skin, head and eyes, extremities, muscles and bones, overall appearance       Nutrition Follow-Up    RD Follow-up?: Yes    [facility] Registration Eligible, Provisional LDN

## 2024-01-07 NOTE — ASSESSMENT & PLAN NOTE
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 Stepped up to the ICU 1/5 with afib RVR refractory to IV metoprolol, started on amio gtt with conversion.      - CT to waterseal  - Daily CXR, FU this am   - Cards consult for refractory afib RVR, s/p chemical cardioversion  PO amio, metop.   Starting heparin this am. Monitor for bleeding. If CT output remains stable, plan to DC tomorrow.   Echo 1/6 with normal function  - Okay to DC arterial line   - KUB this am due to distention  - poor oral intake with hypochloremia - 500 NS bolus this am.   - Continue bowel reg; add enema today as well as mag citrate from above.  - Renal, gluten free diet  - Nephrology following, appreciate assistance. HD 1/5/2023  - Daily RFP   - MM pain control  - Home meds reviewed, restarted as appropriate   - PT/OT  - DVT ppx    Okay for stepdown this afternoon

## 2024-01-07 NOTE — PROGRESS NOTES
[facility]  Critical Care - Surgery  Progress Note    Patient Name: Lily Green  MRN: [id number]  Admission Date: 1/2/2024  Hospital Length of Stay: 5 days  Code Status: Full Code  Attending Provider: Tan Thomson MD  Primary Care Provider: Pavel Calixto MD   Principal Problem: Malignant carcinoid tumor of bronchus    Subjective:       Interval History/Significant Events: NAEON. Walked yesterday. Complains of constipation. Belly distended, not rigid or painful. Suppository given overnight without BM. Plan for enema tonight. Trops downtrending.     Follow-up For: Procedure(s) (LRB):  LYMPHADENECTOMY (Right)  THORACOTOMY (Right)  THORACOTOMY, WITH INITIAL THERAPEUTIC WEDGE RESECTION OF LUNG (Right)    Post-Operative Day: 5 Days Post-Op    Objective:     Vital Signs (Most Recent):  Temp: 98 °F (36.7 °C) (01/07/24 0701)  Pulse: 90 (01/07/24 1015)  Resp: (!) 22 (01/07/24 1015)  BP: (!) 158/71 (01/07/24 1000)  SpO2: (!) 94 % (01/07/24 1015) Vital Signs (24h Range):  Temp:  [98 °F (36.7 °C)-98.8 °F (37.1 °C)] 98 °F (36.7 °C)  Pulse:  [73-94] 90  Resp:  [15-42] 22  SpO2:  [85 %-98 %] 94 %  BP: ()/(50-71) 158/71  Arterial Line BP: ()/(36-66) 138/59     Weight: 68.5 kg (151 lb 0.2 oz)  Body mass index is 25.13 kg/m².      Intake/Output Summary (Last 24 hours) at 1/7/2024 1032  Last data filed at 1/7/2024 0320  Gross per 24 hour   Intake 1042.19 ml   Output 0 ml   Net 1042.19 ml          Physical Exam  Constitutional:       Appearance: Normal appearance.   Cardiovascular:      Rate and Rhythm: Normal rate and regular rhythm.      Pulses: Normal pulses.      Comments: Arterial line in place  Pulmonary:      Effort: Pulmonary effort is normal.   Abdominal:      General: Abdomen is flat. There is no distension.      Palpations: Abdomen is soft.      Tenderness: There is no abdominal tenderness.   Skin:     General: Skin is warm and dry.   Neurological:      General: No  focal deficit present.      Mental Status: She is alert.            Vents:       Lines/Drains/Airways       Central Venous Catheter Line  Duration                  Hemodialysis Catheter 01/25/23 1501 right internal jugular 346 days              Drain  Duration                  Chest Tube 01/02/24 1130 Tube - 1 Right Pleural 28 Fr. 4 days              Arterial Line  Duration             Arterial Line 01/05/24 2305 Right Radial 1 day              Peripheral Intravenous Line  Duration                  Hemodialysis AV Fistula 09/01/23 0800 Left forearm 128 days         Peripheral IV - Single Lumen 01/04/24 1821 Right;Lateral Wrist 2 days         Peripheral IV - Single Lumen 01/05/24 1546 20 G;1 3/4 in Right Forearm 1 day                    Significant Labs:    CBC/Anemia Profile:  Recent Labs   Lab 01/06/24  0303 01/06/24  1442 01/07/24  0320   WBC 13.00* 8.99 7.69   HGB 7.7* 8.1* 8.4*   HCT 22.3* 24.4* 25.4*    214 244   MCV 86 91 92   RDW 15.4* 15.5* 15.2*        Chemistries:  Recent Labs   Lab 01/05/24  1345 01/06/24  0303 01/06/24  1442 01/07/24  0320    132*  132*  --  132*  132*   K 4.4 4.9  4.9 4.9 4.9  4.9    97  97  --  89*  89*   CO2 21* 22*  22*  --  23  23   BUN 17 28*  28*  --  47*  47*   CREATININE 3.2* 4.1*  4.1*  --  5.9*  5.9*   CALCIUM 9.1 8.8  8.8  --  9.4  9.4   ALBUMIN 3.0* 2.4*  2.4*  --  2.4*  2.4*   PROT 7.1 6.0  --  6.6   BILITOT 1.1* 0.6  --  1.5*   ALKPHOS 95 83  --  171*   ALT <5* <5*  --  <5*   AST 23 20  --  31   MG 1.9 2.0  --  2.2   PHOS 2.9 4.4  4.4  --  6.7*  6.7*       All pertinent labs within the past 24 hours have been reviewed.    Significant Imaging:  I have reviewed all pertinent imaging results/findings within the past 24 hours.  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 Stepped up to the unit for persistent afib rvr.       Neuro/Psych:   -- Sedation: none  -- Pain:  Tylenol, Robaxin, Lyrica, Oxy 5s and 10s PRN             Cards:   -- HDS  -- BP goals SBP >90, MAPs >55  --  stepped up for AFib RVR, with stable blood pressure w/ tachycardia to the 140s, has now converted to NSR   - cardiology consulted, appreciate assistance.   - Amio gtt transition to PO and d/c'd  - If BP stable for 24h will start Toprol 12.5mg qd.    - Eliquis 5mg BID       Pulm:   -- Goal O2 sat > 90%  -- currently on RA      Renal:  -- history of ESRD, dialysis dependent.  Monday Wednesday Friday schedule while in the hospital.  -- trend BUN/Cr   -- patient is anuric  Recent Labs   Lab 01/05/24  1345 01/06/24  0303 01/07/24  0320   BUN 17 28*  28* 47*  47*   CREATININE 3.2* 4.1*  4.1* 5.9*  5.9*           FEN / GI:   -- Replace lytes as needed  -- Nutrition:  Renal diet, Dietician consult placed.  -- MiraLax, docusate, suppository, enema      ID:   -- Tm: afebrile; WBC within normal limits  Recent Labs   Lab 01/06/24  0303 01/06/24  1442 01/07/24  0320   WBC 13.00* 8.99 7.69       -- Abx none      Heme/Onc:  -- recheck hemoglobin  -- Daily CBC  Recent Labs   Lab 01/06/24  0303 01/06/24  1442 01/07/24  0320   HGB 7.7* 8.1* 8.4*    214 244           Endo:   -- history of diabetes  -- Gluc goal 140-180  -- SSI      PPx:   Feeding:  Renal diet  Analgesia/Sedation:  Pain is controlled, no sedation was  Thromboembolic prevention: hep q8  HOB >30: yes  Stress Ulcer ppx: n/a  Glucose control: Critical care goal 140-180 g/dl, SSI  Bowel reg: miralax  Invasive Lines/Drains/Airway: trialysis line  Deescalation: n/a        Dispo/Code Status/Palliative:   -- step down / Full Code          Critical care was time spent personally by me on the following activities: development of treatment plan with patient or surrogate and bedside caregivers, discussions with consultants, evaluation of patient's  response to treatment, examination of patient, ordering and performing treatments and interventions, ordering and review of laboratory studies, ordering and review of radiographic studies, pulse oximetry, re-evaluation of patient's condition.  This critical care time did not overlap with that of any other provider or involve time for any procedures.     Prabhu Esqueda MD  Critical Care - Surgery  [facility]

## 2024-01-07 NOTE — NURSING
Patient was on the chair and wanted to go back in bed. HR on the 150's. Patient back on Afib/RVR. MD notified.

## 2024-01-08 LAB
ABO + RH BLD: NORMAL
ALBUMIN SERPL BCP-MCNC: 1.8 G/DL (ref 3.5–5.2)
ALBUMIN SERPL BCP-MCNC: 2.1 G/DL (ref 3.5–5.2)
ALBUMIN SERPL BCP-MCNC: 2.1 G/DL (ref 3.5–5.2)
ALBUMIN SERPL BCP-MCNC: 2.4 G/DL (ref 3.5–5.2)
ALBUMIN SERPL BCP-MCNC: 2.5 G/DL (ref 3.5–5.2)
ALBUMIN SERPL BCP-MCNC: 2.5 G/DL (ref 3.5–5.2)
ALLENS TEST: ABNORMAL
ALP SERPL-CCNC: 490 U/L (ref 55–135)
ALP SERPL-CCNC: 514 U/L (ref 55–135)
ALP SERPL-CCNC: 563 U/L (ref 55–135)
ALT SERPL W/O P-5'-P-CCNC: 34 U/L (ref 10–44)
ALT SERPL W/O P-5'-P-CCNC: 46 U/L (ref 10–44)
ALT SERPL W/O P-5'-P-CCNC: 50 U/L (ref 10–44)
ANION GAP SERPL CALC-SCNC: 14 MMOL/L (ref 8–16)
ANION GAP SERPL CALC-SCNC: 20 MMOL/L (ref 8–16)
ANION GAP SERPL CALC-SCNC: 25 MMOL/L (ref 8–16)
ANION GAP SERPL CALC-SCNC: 26 MMOL/L (ref 8–16)
ANION GAP SERPL CALC-SCNC: 27 MMOL/L (ref 8–16)
ANION GAP SERPL CALC-SCNC: 27 MMOL/L (ref 8–16)
ANISOCYTOSIS BLD QL SMEAR: SLIGHT
APTT PPP: 41.5 SEC (ref 21–32)
APTT PPP: 51.9 SEC (ref 21–32)
AST SERPL-CCNC: 130 U/L (ref 10–40)
AST SERPL-CCNC: 154 U/L (ref 10–40)
AST SERPL-CCNC: 179 U/L (ref 10–40)
BASOPHILS # BLD AUTO: 0.01 K/UL (ref 0–0.2)
BASOPHILS # BLD AUTO: 0.02 K/UL (ref 0–0.2)
BASOPHILS # BLD AUTO: ABNORMAL K/UL (ref 0–0.2)
BASOPHILS NFR BLD: 0 % (ref 0–1.9)
BASOPHILS NFR BLD: 0.1 % (ref 0–1.9)
BASOPHILS NFR BLD: 0.3 % (ref 0–1.9)
BILIRUB SERPL-MCNC: 3.1 MG/DL (ref 0.1–1)
BILIRUB SERPL-MCNC: 3.1 MG/DL (ref 0.1–1)
BILIRUB SERPL-MCNC: 3.4 MG/DL (ref 0.1–1)
BLD GP AB SCN CELLS X3 SERPL QL: NORMAL
BLD PROD TYP BPU: NORMAL
BLOOD UNIT EXPIRATION DATE: NORMAL
BLOOD UNIT TYPE CODE: 6200
BLOOD UNIT TYPE: NORMAL
BUN SERPL-MCNC: 36 MG/DL (ref 8–23)
BUN SERPL-MCNC: 64 MG/DL (ref 8–23)
BUN SERPL-MCNC: 72 MG/DL (ref 8–23)
BUN SERPL-MCNC: 72 MG/DL (ref 8–23)
BUN SERPL-MCNC: 74 MG/DL (ref 8–23)
BUN SERPL-MCNC: 75 MG/DL (ref 8–23)
CALCIUM SERPL-MCNC: 8.1 MG/DL (ref 8.7–10.5)
CALCIUM SERPL-MCNC: 8.4 MG/DL (ref 8.7–10.5)
CALCIUM SERPL-MCNC: 8.6 MG/DL (ref 8.7–10.5)
CALCIUM SERPL-MCNC: 8.7 MG/DL (ref 8.7–10.5)
CALCIUM SERPL-MCNC: 9.1 MG/DL (ref 8.7–10.5)
CALCIUM SERPL-MCNC: 9.7 MG/DL (ref 8.7–10.5)
CHLORIDE SERPL-SCNC: 101 MMOL/L (ref 95–110)
CHLORIDE SERPL-SCNC: 78 MMOL/L (ref 95–110)
CHLORIDE SERPL-SCNC: 81 MMOL/L (ref 95–110)
CHLORIDE SERPL-SCNC: 83 MMOL/L (ref 95–110)
CHLORIDE SERPL-SCNC: 84 MMOL/L (ref 95–110)
CHLORIDE SERPL-SCNC: 89 MMOL/L (ref 95–110)
CO2 SERPL-SCNC: 25 MMOL/L (ref 23–29)
CO2 SERPL-SCNC: 26 MMOL/L (ref 23–29)
CO2 SERPL-SCNC: 27 MMOL/L (ref 23–29)
CO2 SERPL-SCNC: 28 MMOL/L (ref 23–29)
CODING SYSTEM: NORMAL
CREAT SERPL-MCNC: 3.6 MG/DL (ref 0.5–1.4)
CREAT SERPL-MCNC: 6.9 MG/DL (ref 0.5–1.4)
CREAT SERPL-MCNC: 7.1 MG/DL (ref 0.5–1.4)
CREAT SERPL-MCNC: 7.2 MG/DL (ref 0.5–1.4)
CREAT SERPL-MCNC: 7.3 MG/DL (ref 0.5–1.4)
CREAT SERPL-MCNC: 7.6 MG/DL (ref 0.5–1.4)
CROSSMATCH INTERPRETATION: NORMAL
DELSYS: ABNORMAL
DIFFERENTIAL METHOD BLD: ABNORMAL
DIGOXIN SERPL-MCNC: >4 NG/ML (ref 0.8–2)
DISPENSE STATUS: NORMAL
EOSINOPHIL # BLD AUTO: 0 K/UL (ref 0–0.5)
EOSINOPHIL # BLD AUTO: 0 K/UL (ref 0–0.5)
EOSINOPHIL # BLD AUTO: ABNORMAL K/UL (ref 0–0.5)
EOSINOPHIL NFR BLD: 0 % (ref 0–8)
EOSINOPHIL NFR BLD: 0 % (ref 0–8)
EOSINOPHIL NFR BLD: 0.1 % (ref 0–8)
ERYTHROCYTE [DISTWIDTH] IN BLOOD BY AUTOMATED COUNT: 15.1 % (ref 11.5–14.5)
ERYTHROCYTE [DISTWIDTH] IN BLOOD BY AUTOMATED COUNT: 15.1 % (ref 11.5–14.5)
ERYTHROCYTE [DISTWIDTH] IN BLOOD BY AUTOMATED COUNT: 15.3 % (ref 11.5–14.5)
EST. GFR  (NO RACE VARIABLE): 12.8 ML/MIN/1.73 M^2
EST. GFR  (NO RACE VARIABLE): 5.2 ML/MIN/1.73 M^2
EST. GFR  (NO RACE VARIABLE): 5.5 ML/MIN/1.73 M^2
EST. GFR  (NO RACE VARIABLE): 5.6 ML/MIN/1.73 M^2
EST. GFR  (NO RACE VARIABLE): 5.7 ML/MIN/1.73 M^2
EST. GFR  (NO RACE VARIABLE): 5.9 ML/MIN/1.73 M^2
FINAL PATHOLOGIC DIAGNOSIS: NORMAL
GLUCOSE SERPL-MCNC: 119 MG/DL (ref 70–110)
GLUCOSE SERPL-MCNC: 152 MG/DL (ref 70–110)
GLUCOSE SERPL-MCNC: 201 MG/DL (ref 70–110)
GLUCOSE SERPL-MCNC: 237 MG/DL (ref 70–110)
GLUCOSE SERPL-MCNC: 245 MG/DL (ref 70–110)
GLUCOSE SERPL-MCNC: 318 MG/DL (ref 70–110)
GROSS: NORMAL
HCO3 UR-SCNC: 28.8 MMOL/L (ref 24–28)
HCO3 UR-SCNC: 29.6 MMOL/L (ref 24–28)
HCT VFR BLD AUTO: 20 % (ref 37–48.5)
HCT VFR BLD AUTO: 23.5 % (ref 37–48.5)
HCT VFR BLD AUTO: 25.1 % (ref 37–48.5)
HCT VFR BLD CALC: 32 %PCV (ref 36–54)
HGB BLD-MCNC: 7 G/DL (ref 12–16)
HGB BLD-MCNC: 8.2 G/DL (ref 12–16)
HGB BLD-MCNC: 8.7 G/DL (ref 12–16)
HYPOCHROMIA BLD QL SMEAR: ABNORMAL
IMM GRANULOCYTES # BLD AUTO: 0.03 K/UL (ref 0–0.04)
IMM GRANULOCYTES # BLD AUTO: 0.06 K/UL (ref 0–0.04)
IMM GRANULOCYTES # BLD AUTO: ABNORMAL K/UL (ref 0–0.04)
IMM GRANULOCYTES NFR BLD AUTO: 0.4 % (ref 0–0.5)
IMM GRANULOCYTES NFR BLD AUTO: 0.7 % (ref 0–0.5)
IMM GRANULOCYTES NFR BLD AUTO: ABNORMAL % (ref 0–0.5)
INR PPP: 4.2 (ref 0.8–1.2)
LDH SERPL L TO P-CCNC: 2.06 MMOL/L (ref 0.36–1.25)
LYMPHOCYTES # BLD AUTO: 0.9 K/UL (ref 1–4.8)
LYMPHOCYTES # BLD AUTO: 1 K/UL (ref 1–4.8)
LYMPHOCYTES # BLD AUTO: ABNORMAL K/UL (ref 1–4.8)
LYMPHOCYTES NFR BLD: 11.7 % (ref 18–48)
LYMPHOCYTES NFR BLD: 13 % (ref 18–48)
LYMPHOCYTES NFR BLD: 14.2 % (ref 18–48)
Lab: NORMAL
MAGNESIUM SERPL-MCNC: 2.4 MG/DL (ref 1.6–2.6)
MAGNESIUM SERPL-MCNC: 3 MG/DL (ref 1.6–2.6)
MAGNESIUM SERPL-MCNC: 3.1 MG/DL (ref 1.6–2.6)
MAGNESIUM SERPL-MCNC: 3.3 MG/DL (ref 1.6–2.6)
MCH RBC QN AUTO: 30 PG (ref 27–31)
MCH RBC QN AUTO: 30.7 PG (ref 27–31)
MCH RBC QN AUTO: 30.8 PG (ref 27–31)
MCHC RBC AUTO-ENTMCNC: 34.7 G/DL (ref 32–36)
MCHC RBC AUTO-ENTMCNC: 34.9 G/DL (ref 32–36)
MCHC RBC AUTO-ENTMCNC: 35 G/DL (ref 32–36)
MCV RBC AUTO: 86 FL (ref 82–98)
MCV RBC AUTO: 88 FL (ref 82–98)
MCV RBC AUTO: 89 FL (ref 82–98)
METAMYELOCYTES NFR BLD MANUAL: 3 %
MODE: ABNORMAL
MONOCYTES # BLD AUTO: 1.3 K/UL (ref 0.3–1)
MONOCYTES # BLD AUTO: 1.6 K/UL (ref 0.3–1)
MONOCYTES # BLD AUTO: ABNORMAL K/UL (ref 0.3–1)
MONOCYTES NFR BLD: 19.9 % (ref 4–15)
MONOCYTES NFR BLD: 20.1 % (ref 4–15)
MONOCYTES NFR BLD: 24 % (ref 4–15)
NEUTROPHILS # BLD AUTO: 4.3 K/UL (ref 1.8–7.7)
NEUTROPHILS # BLD AUTO: 5.4 K/UL (ref 1.8–7.7)
NEUTROPHILS NFR BLD: 54 % (ref 38–73)
NEUTROPHILS NFR BLD: 65.1 % (ref 38–73)
NEUTROPHILS NFR BLD: 67.4 % (ref 38–73)
NEUTS BAND NFR BLD MANUAL: 6 %
NRBC BLD-RTO: 0 /100 WBC
NUM UNITS TRANS PACKED RBC: NORMAL
OVALOCYTES BLD QL SMEAR: ABNORMAL
PCO2 BLDA: 31.6 MMHG (ref 35–45)
PCO2 BLDA: 45 MMHG (ref 35–45)
PH SMN: 7.43 [PH] (ref 7.35–7.45)
PH SMN: 7.57 [PH] (ref 7.35–7.45)
PHOSPHATE SERPL-MCNC: 10.3 MG/DL (ref 2.7–4.5)
PHOSPHATE SERPL-MCNC: 10.6 MG/DL (ref 2.7–4.5)
PHOSPHATE SERPL-MCNC: 10.7 MG/DL (ref 2.7–4.5)
PHOSPHATE SERPL-MCNC: 10.8 MG/DL (ref 2.7–4.5)
PHOSPHATE SERPL-MCNC: 4.8 MG/DL (ref 2.7–4.5)
PHOSPHATE SERPL-MCNC: 8.5 MG/DL (ref 2.7–4.5)
PLATELET # BLD AUTO: 304 K/UL (ref 150–450)
PLATELET # BLD AUTO: 328 K/UL (ref 150–450)
PLATELET # BLD AUTO: 353 K/UL (ref 150–450)
PLATELET BLD QL SMEAR: ABNORMAL
PMV BLD AUTO: 11.1 FL (ref 9.2–12.9)
PMV BLD AUTO: 11.1 FL (ref 9.2–12.9)
PMV BLD AUTO: 11.5 FL (ref 9.2–12.9)
PO2 BLDA: 66 MMHG (ref 80–100)
PO2 BLDA: 77 MMHG (ref 80–100)
POC BE: 5 MMOL/L
POC BE: 7 MMOL/L
POC IONIZED CALCIUM: 0.94 MMOL/L (ref 1.06–1.42)
POC SATURATED O2: 95 % (ref 95–100)
POC SATURATED O2: 96 % (ref 95–100)
POC TCO2: 30 MMOL/L (ref 23–27)
POC TCO2: 31 MMOL/L (ref 23–27)
POCT GLUCOSE: 116 MG/DL (ref 70–110)
POCT GLUCOSE: 147 MG/DL (ref 70–110)
POCT GLUCOSE: 165 MG/DL (ref 70–110)
POCT GLUCOSE: 174 MG/DL (ref 70–110)
POCT GLUCOSE: 175 MG/DL (ref 70–110)
POCT GLUCOSE: 223 MG/DL (ref 70–110)
POCT GLUCOSE: 234 MG/DL (ref 70–110)
POCT GLUCOSE: 272 MG/DL (ref 70–110)
POIKILOCYTOSIS BLD QL SMEAR: SLIGHT
POTASSIUM BLD-SCNC: 4.9 MMOL/L (ref 3.5–5.1)
POTASSIUM SERPL-SCNC: 4.4 MMOL/L (ref 3.5–5.1)
POTASSIUM SERPL-SCNC: 4.4 MMOL/L (ref 3.5–5.1)
POTASSIUM SERPL-SCNC: 4.5 MMOL/L (ref 3.5–5.1)
POTASSIUM SERPL-SCNC: 4.5 MMOL/L (ref 3.5–5.1)
POTASSIUM SERPL-SCNC: 4.8 MMOL/L (ref 3.5–5.1)
POTASSIUM SERPL-SCNC: 4.9 MMOL/L (ref 3.5–5.1)
POTASSIUM SERPL-SCNC: 5.2 MMOL/L (ref 3.5–5.1)
POTASSIUM SERPL-SCNC: 5.2 MMOL/L (ref 3.5–5.1)
POTASSIUM SERPL-SCNC: 5.6 MMOL/L (ref 3.5–5.1)
PROT SERPL-MCNC: 5.9 G/DL (ref 6–8.4)
PROT SERPL-MCNC: 6.8 G/DL (ref 6–8.4)
PROT SERPL-MCNC: 7 G/DL (ref 6–8.4)
PROTHROMBIN TIME: 41.7 SEC (ref 9–12.5)
RBC # BLD AUTO: 2.33 M/UL (ref 4–5.4)
RBC # BLD AUTO: 2.66 M/UL (ref 4–5.4)
RBC # BLD AUTO: 2.83 M/UL (ref 4–5.4)
SAMPLE: ABNORMAL
SITE: ABNORMAL
SODIUM BLD-SCNC: 129 MMOL/L (ref 136–145)
SODIUM SERPL-SCNC: 133 MMOL/L (ref 136–145)
SODIUM SERPL-SCNC: 134 MMOL/L (ref 136–145)
SODIUM SERPL-SCNC: 135 MMOL/L (ref 136–145)
SODIUM SERPL-SCNC: 135 MMOL/L (ref 136–145)
SODIUM SERPL-SCNC: 136 MMOL/L (ref 136–145)
SODIUM SERPL-SCNC: 140 MMOL/L (ref 136–145)
SPECIMEN OUTDATE: NORMAL
WBC # BLD AUTO: 6.67 K/UL (ref 3.9–12.7)
WBC # BLD AUTO: 7.59 K/UL (ref 3.9–12.7)
WBC # BLD AUTO: 8.05 K/UL (ref 3.9–12.7)

## 2024-01-08 PROCEDURE — 84295 ASSAY OF SERUM SODIUM: CPT

## 2024-01-08 PROCEDURE — 36620 INSERTION CATHETER ARTERY: CPT | Mod: 59,,, | Performed by: ANESTHESIOLOGY

## 2024-01-08 PROCEDURE — 86901 BLOOD TYPING SEROLOGIC RH(D): CPT

## 2024-01-08 PROCEDURE — 36620 INSERTION CATHETER ARTERY: CPT

## 2024-01-08 PROCEDURE — 86920 COMPATIBILITY TEST SPIN: CPT

## 2024-01-08 PROCEDURE — 80053 COMPREHEN METABOLIC PANEL: CPT

## 2024-01-08 PROCEDURE — 99232 SBSQ HOSP IP/OBS MODERATE 35: CPT | Mod: GC,,, | Performed by: INTERNAL MEDICINE

## 2024-01-08 PROCEDURE — 90945 DIALYSIS ONE EVALUATION: CPT

## 2024-01-08 PROCEDURE — P9016 RBC LEUKOCYTES REDUCED: HCPCS

## 2024-01-08 PROCEDURE — 84100 ASSAY OF PHOSPHORUS: CPT | Mod: 91 | Performed by: SURGERY

## 2024-01-08 PROCEDURE — 84100 ASSAY OF PHOSPHORUS: CPT

## 2024-01-08 PROCEDURE — 80069 RENAL FUNCTION PANEL: CPT | Performed by: NURSE PRACTITIONER

## 2024-01-08 PROCEDURE — 94761 N-INVAS EAR/PLS OXIMETRY MLT: CPT

## 2024-01-08 PROCEDURE — 85014 HEMATOCRIT: CPT

## 2024-01-08 PROCEDURE — 37799 UNLISTED PX VASCULAR SURGERY: CPT

## 2024-01-08 PROCEDURE — 83605 ASSAY OF LACTIC ACID: CPT

## 2024-01-08 PROCEDURE — 25000003 PHARM REV CODE 250: Performed by: SURGERY

## 2024-01-08 PROCEDURE — 63600175 PHARM REV CODE 636 W HCPCS

## 2024-01-08 PROCEDURE — 80053 COMPREHEN METABOLIC PANEL: CPT | Mod: 91 | Performed by: SURGERY

## 2024-01-08 PROCEDURE — 99232 SBSQ HOSP IP/OBS MODERATE 35: CPT | Mod: ,,, | Performed by: NURSE PRACTITIONER

## 2024-01-08 PROCEDURE — 84132 ASSAY OF SERUM POTASSIUM: CPT

## 2024-01-08 PROCEDURE — 25000003 PHARM REV CODE 250

## 2024-01-08 PROCEDURE — 99900035 HC TECH TIME PER 15 MIN (STAT)

## 2024-01-08 PROCEDURE — 11000001 HC ACUTE MED/SURG PRIVATE ROOM

## 2024-01-08 PROCEDURE — 80162 ASSAY OF DIGOXIN TOTAL: CPT

## 2024-01-08 PROCEDURE — 63600175 PHARM REV CODE 636 W HCPCS: Performed by: SURGERY

## 2024-01-08 PROCEDURE — 85730 THROMBOPLASTIN TIME PARTIAL: CPT | Mod: 91

## 2024-01-08 PROCEDURE — 25000003 PHARM REV CODE 250: Performed by: PHYSICIAN ASSISTANT

## 2024-01-08 PROCEDURE — 99291 CRITICAL CARE FIRST HOUR: CPT | Mod: ,,, | Performed by: ANESTHESIOLOGY

## 2024-01-08 PROCEDURE — 03HY32Z INSERTION OF MONITORING DEVICE INTO UPPER ARTERY, PERCUTANEOUS APPROACH: ICD-10-PCS | Performed by: ANESTHESIOLOGY

## 2024-01-08 PROCEDURE — 80069 RENAL FUNCTION PANEL: CPT | Mod: 91 | Performed by: SURGERY

## 2024-01-08 PROCEDURE — 25500020 PHARM REV CODE 255: Performed by: STUDENT IN AN ORGANIZED HEALTH CARE EDUCATION/TRAINING PROGRAM

## 2024-01-08 PROCEDURE — 82330 ASSAY OF CALCIUM: CPT

## 2024-01-08 PROCEDURE — 85610 PROTHROMBIN TIME: CPT | Performed by: SURGERY

## 2024-01-08 PROCEDURE — 93010 ELECTROCARDIOGRAM REPORT: CPT | Mod: ,,, | Performed by: INTERNAL MEDICINE

## 2024-01-08 PROCEDURE — 85027 COMPLETE CBC AUTOMATED: CPT | Performed by: STUDENT IN AN ORGANIZED HEALTH CARE EDUCATION/TRAINING PROGRAM

## 2024-01-08 PROCEDURE — 83735 ASSAY OF MAGNESIUM: CPT | Mod: 91 | Performed by: NURSE PRACTITIONER

## 2024-01-08 PROCEDURE — 99233 SBSQ HOSP IP/OBS HIGH 50: CPT | Mod: ,,, | Performed by: INTERNAL MEDICINE

## 2024-01-08 PROCEDURE — 25000003 PHARM REV CODE 250: Performed by: NURSE PRACTITIONER

## 2024-01-08 PROCEDURE — 63600175 PHARM REV CODE 636 W HCPCS: Mod: JZ,JG | Performed by: STUDENT IN AN ORGANIZED HEALTH CARE EDUCATION/TRAINING PROGRAM

## 2024-01-08 PROCEDURE — 85730 THROMBOPLASTIN TIME PARTIAL: CPT | Performed by: SURGERY

## 2024-01-08 PROCEDURE — 83735 ASSAY OF MAGNESIUM: CPT | Mod: 91 | Performed by: SURGERY

## 2024-01-08 PROCEDURE — 85025 COMPLETE CBC W/AUTO DIFF WBC: CPT

## 2024-01-08 PROCEDURE — P9045 ALBUMIN (HUMAN), 5%, 250 ML: HCPCS | Mod: JZ,JG | Performed by: STUDENT IN AN ORGANIZED HEALTH CARE EDUCATION/TRAINING PROGRAM

## 2024-01-08 PROCEDURE — 85025 COMPLETE CBC W/AUTO DIFF WBC: CPT | Mod: 91

## 2024-01-08 PROCEDURE — 93005 ELECTROCARDIOGRAM TRACING: CPT

## 2024-01-08 PROCEDURE — 82803 BLOOD GASES ANY COMBINATION: CPT

## 2024-01-08 PROCEDURE — 83735 ASSAY OF MAGNESIUM: CPT | Mod: 91

## 2024-01-08 PROCEDURE — 85007 BL SMEAR W/DIFF WBC COUNT: CPT | Performed by: STUDENT IN AN ORGANIZED HEALTH CARE EDUCATION/TRAINING PROGRAM

## 2024-01-08 RX ORDER — OXYCODONE HYDROCHLORIDE 5 MG/1
5 TABLET ORAL EVERY 4 HOURS PRN
Status: DISCONTINUED | OUTPATIENT
Start: 2024-01-08 | End: 2024-01-08

## 2024-01-08 RX ORDER — PHENYLEPHRINE HCL IN 0.9% NACL 1 MG/10 ML
SYRINGE (ML) INTRAVENOUS
Status: DISCONTINUED
Start: 2024-01-08 | End: 2024-01-08 | Stop reason: WASHOUT

## 2024-01-08 RX ORDER — HYDROCODONE BITARTRATE AND ACETAMINOPHEN 500; 5 MG/1; MG/1
TABLET ORAL CONTINUOUS
Status: ACTIVE | OUTPATIENT
Start: 2024-01-08 | End: 2024-01-09

## 2024-01-08 RX ORDER — PHENYLEPHRINE HCL IN 0.9% NACL 20MG/250ML
0-5 PLASTIC BAG, INJECTION (ML) INTRAVENOUS CONTINUOUS
Status: DISCONTINUED | OUTPATIENT
Start: 2024-01-08 | End: 2024-01-09

## 2024-01-08 RX ORDER — HYDROMORPHONE HYDROCHLORIDE 1 MG/ML
0.2 INJECTION, SOLUTION INTRAMUSCULAR; INTRAVENOUS; SUBCUTANEOUS EVERY 8 HOURS PRN
Status: DISCONTINUED | OUTPATIENT
Start: 2024-01-08 | End: 2024-01-09

## 2024-01-08 RX ORDER — CALCIUM CHLORIDE INJECTION 100 MG/ML
0.5 INJECTION, SOLUTION INTRAVENOUS ONCE
Status: DISCONTINUED | OUTPATIENT
Start: 2024-01-08 | End: 2024-01-08

## 2024-01-08 RX ORDER — HEPARIN SODIUM,PORCINE/D5W 25000/250
0-40 INTRAVENOUS SOLUTION INTRAVENOUS CONTINUOUS
Status: DISCONTINUED | OUTPATIENT
Start: 2024-01-08 | End: 2024-01-09

## 2024-01-08 RX ORDER — PHENYLEPHRINE HCL IN 0.9% NACL 20MG/250ML
0-5 PLASTIC BAG, INJECTION (ML) INTRAVENOUS CONTINUOUS
Status: DISCONTINUED | OUTPATIENT
Start: 2024-01-08 | End: 2024-01-08

## 2024-01-08 RX ORDER — HYDROCODONE BITARTRATE AND ACETAMINOPHEN 500; 5 MG/1; MG/1
TABLET ORAL
Status: DISCONTINUED | OUTPATIENT
Start: 2024-01-08 | End: 2024-01-09

## 2024-01-08 RX ORDER — MAGNESIUM SULFATE HEPTAHYDRATE 40 MG/ML
2 INJECTION, SOLUTION INTRAVENOUS
Status: ACTIVE | OUTPATIENT
Start: 2024-01-08 | End: 2024-01-09

## 2024-01-08 RX ORDER — CALCIUM GLUCONATE 20 MG/ML
1 INJECTION, SOLUTION INTRAVENOUS ONCE
Status: DISCONTINUED | OUTPATIENT
Start: 2024-01-08 | End: 2024-01-08

## 2024-01-08 RX ORDER — MIDODRINE HYDROCHLORIDE 5 MG/1
15 TABLET ORAL
Status: DISCONTINUED | OUTPATIENT
Start: 2024-01-08 | End: 2024-01-08

## 2024-01-08 RX ORDER — LIDOCAINE HYDROCHLORIDE 10 MG/ML
INJECTION, SOLUTION EPIDURAL; INFILTRATION; INTRACAUDAL; PERINEURAL
Status: COMPLETED
Start: 2024-01-08 | End: 2024-01-08

## 2024-01-08 RX ORDER — ALBUMIN HUMAN 50 G/1000ML
25 SOLUTION INTRAVENOUS ONCE
Status: COMPLETED | OUTPATIENT
Start: 2024-01-08 | End: 2024-01-08

## 2024-01-08 RX ORDER — ACETAMINOPHEN 500 MG
1000 TABLET ORAL EVERY 8 HOURS
Status: DISCONTINUED | OUTPATIENT
Start: 2024-01-08 | End: 2024-01-08

## 2024-01-08 RX ORDER — PANTOPRAZOLE SODIUM 40 MG/10ML
40 INJECTION, POWDER, LYOPHILIZED, FOR SOLUTION INTRAVENOUS DAILY
Status: DISCONTINUED | OUTPATIENT
Start: 2024-01-09 | End: 2024-01-11

## 2024-01-08 RX ORDER — METOPROLOL TARTRATE 25 MG/1
25 TABLET, FILM COATED ORAL EVERY 8 HOURS
Status: DISCONTINUED | OUTPATIENT
Start: 2024-01-08 | End: 2024-01-08

## 2024-01-08 RX ORDER — CALCIUM GLUCONATE 20 MG/ML
1 INJECTION, SOLUTION INTRAVENOUS EVERY 10 MIN PRN
Status: DISCONTINUED | OUTPATIENT
Start: 2024-01-08 | End: 2024-01-08

## 2024-01-08 RX ORDER — SYRING-NEEDL,DISP,INSUL,0.3 ML 29 G X1/2"
296 SYRINGE, EMPTY DISPOSABLE MISCELLANEOUS 2 TIMES DAILY
Status: DISCONTINUED | OUTPATIENT
Start: 2024-01-08 | End: 2024-01-08

## 2024-01-08 RX ORDER — ACETAMINOPHEN 10 MG/ML
1000 INJECTION, SOLUTION INTRAVENOUS EVERY 8 HOURS
Status: DISPENSED | OUTPATIENT
Start: 2024-01-08 | End: 2024-01-09

## 2024-01-08 RX ORDER — METOPROLOL TARTRATE 1 MG/ML
5 INJECTION, SOLUTION INTRAVENOUS EVERY 6 HOURS
Status: DISCONTINUED | OUTPATIENT
Start: 2024-01-08 | End: 2024-01-09

## 2024-01-08 RX ADMIN — HEPARIN SODIUM 12 UNITS/KG/HR: 10000 INJECTION, SOLUTION INTRAVENOUS at 03:01

## 2024-01-08 RX ADMIN — INSULIN HUMAN 6.85 UNITS: 100 INJECTION, SOLUTION PARENTERAL at 05:01

## 2024-01-08 RX ADMIN — SENNOSIDES AND DOCUSATE SODIUM 1 TABLET: 8.6; 5 TABLET ORAL at 10:01

## 2024-01-08 RX ADMIN — AMIODARONE HYDROCHLORIDE 150 MG: 1.5 INJECTION, SOLUTION INTRAVENOUS at 12:01

## 2024-01-08 RX ADMIN — APIXABAN 5 MG: 2.5 TABLET, FILM COATED ORAL at 10:01

## 2024-01-08 RX ADMIN — LIDOCAINE 5% 1 PATCH: 700 PATCH TOPICAL at 10:01

## 2024-01-08 RX ADMIN — AMIODARONE HYDROCHLORIDE 0.5 MG/MIN: 1.8 INJECTION, SOLUTION INTRAVENOUS at 05:01

## 2024-01-08 RX ADMIN — DEXTROSE MONOHYDRATE 500 ML: 100 INJECTION, SOLUTION INTRAVENOUS at 05:01

## 2024-01-08 RX ADMIN — POLYETHYLENE GLYCOL 3350 17 G: 17 POWDER, FOR SOLUTION ORAL at 10:01

## 2024-01-08 RX ADMIN — ONDANSETRON 8 MG: 2 INJECTION INTRAMUSCULAR; INTRAVENOUS at 01:01

## 2024-01-08 RX ADMIN — MIDODRINE HYDROCHLORIDE 15 MG: 5 TABLET ORAL at 10:01

## 2024-01-08 RX ADMIN — ACETAMINOPHEN 1000 MG: 10 INJECTION, SOLUTION INTRAVENOUS at 10:01

## 2024-01-08 RX ADMIN — IOHEXOL 75 ML: 350 INJECTION, SOLUTION INTRAVENOUS at 02:01

## 2024-01-08 RX ADMIN — DIGOXIN 125 MCG: 0.25 INJECTION INTRAMUSCULAR; INTRAVENOUS at 02:01

## 2024-01-08 RX ADMIN — HYDROMORPHONE HYDROCHLORIDE 0.2 MG: 0.5 INJECTION, SOLUTION INTRAMUSCULAR; INTRAVENOUS; SUBCUTANEOUS at 12:01

## 2024-01-08 RX ADMIN — METHOCARBAMOL 500 MG: 500 TABLET ORAL at 10:01

## 2024-01-08 RX ADMIN — LIDOCAINE HYDROCHLORIDE: 10 SOLUTION INTRAVENOUS at 12:01

## 2024-01-08 RX ADMIN — SODIUM CHLORIDE 500 ML: 9 INJECTION, SOLUTION INTRAVENOUS at 12:01

## 2024-01-08 RX ADMIN — MAGNESIUM CITRATE 296 ML: 1.75 LIQUID ORAL at 11:01

## 2024-01-08 RX ADMIN — SODIUM CHLORIDE 500 ML: 9 INJECTION, SOLUTION INTRAVENOUS at 05:01

## 2024-01-08 RX ADMIN — AMIODARONE HYDROCHLORIDE 0.5 MG/MIN: 1.8 INJECTION, SOLUTION INTRAVENOUS at 11:01

## 2024-01-08 RX ADMIN — SODIUM CHLORIDE 500 ML: 9 INJECTION, SOLUTION INTRAVENOUS at 06:01

## 2024-01-08 RX ADMIN — PANTOPRAZOLE SODIUM 40 MG: 40 TABLET, DELAYED RELEASE ORAL at 10:01

## 2024-01-08 RX ADMIN — ACETAMINOPHEN 650 MG: 325 TABLET ORAL at 05:01

## 2024-01-08 RX ADMIN — Medication 1 ENEMA: at 08:01

## 2024-01-08 RX ADMIN — DIGOXIN 125 MCG: 250 INJECTION, SOLUTION INTRAMUSCULAR; INTRAVENOUS at 08:01

## 2024-01-08 RX ADMIN — MUPIROCIN: 20 OINTMENT TOPICAL at 10:01

## 2024-01-08 RX ADMIN — OXYCODONE HYDROCHLORIDE 10 MG: 10 TABLET ORAL at 03:01

## 2024-01-08 RX ADMIN — METOROPROLOL TARTRATE 5 MG: 5 INJECTION, SOLUTION INTRAVENOUS at 11:01

## 2024-01-08 RX ADMIN — CALCIUM CHLORIDE 0.5 G: 100 INJECTION, SOLUTION INTRAVENOUS at 01:01

## 2024-01-08 RX ADMIN — Medication 0.5 MCG/KG/MIN: at 03:01

## 2024-01-08 RX ADMIN — ALBUMIN (HUMAN) 25 G: 12.5 SOLUTION INTRAVENOUS at 04:01

## 2024-01-08 RX ADMIN — SODIUM CHLORIDE: 9 INJECTION, SOLUTION INTRAVENOUS at 06:01

## 2024-01-08 RX ADMIN — MUPIROCIN: 20 OINTMENT TOPICAL at 08:01

## 2024-01-08 NOTE — PROCEDURES
"Lily Green is a 73 y.o. female patient.    Temp: 97.8 °F (36.6 °C) (01/08/24 1515)  Pulse: 90 (01/08/24 1627)  Resp: 11 (01/08/24 1627)  BP: (!) 76/43 (01/08/24 1600)  SpO2: (!) 91 % (01/08/24 1627)  Weight: 68.5 kg (151 lb 0.2 oz) (01/07/24 0600)  Height: 5' 5" (165.1 cm) (01/06/24 1400)       Arterial Line    Date/Time: 1/8/2024 12:07 PM  Location procedure was performed: [facility] CRITICAL CARE SURGERY    Performed by: Brooklyn Sapp MD  Authorized by: Brooklyn Sapp MD  Consent Done: Yes  Consent: Written consent obtained.  Indications: hemodynamic monitoring  Location: right radial  Anesthesia: local infiltration    Anesthesia:  Local Anesthetic: lidocaine 1% without epinephrine  Number of attempts: 1  Complications: No  Post-procedure: line sutured  Patient tolerance: Patient tolerated the procedure well with no immediate complications          1/8/2024    "

## 2024-01-08 NOTE — NURSING
Pt remains in AFIB with HR 115s-140s. 250 mg Digoxin administered at 2030 per order, to be followed by 125mg in 6 hrs x2. Pt asymptomatic at this time. WCTM.

## 2024-01-08 NOTE — ASSESSMENT & PLAN NOTE
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 Stepped up to the ICU 1/5 with afib RVR refractory to IV metoprolol, started on amio gtt with conversion.     - CT to waterseal; will likely remove this am   - Daily CXR, FU this am   - Cards consult for refractory afib RVR, s/p chemical cardioversion  PO amio, metop.   Continue eliquis  Consult EP given refractory afib on multiple agents.   Echo 1/6 with normal function  - KUB with improved gastric distention, continued colonic distention.   - Continue bowel reg;repeat enema  - NPO  - Renal, gluten free diet  - Nephrology following, appreciate assistance. HD 1/5/2023. Wou;d favor SLED today given marginal pressures and electrolyte disturbances without signs of fluid overload, defer to nephrology  - Daily RFP   - MM pain control  - Home meds reviewed, restarted as appropriate   - PT/OT  - DVT ppx

## 2024-01-08 NOTE — ASSESSMENT & PLAN NOTE
BG goal: 140-180  BG excursions noted related to shifting potassium.     - Novolog Low dose correction with ISF 50 starting at 150 prn ac/hs   - POCT Glucose before meals and at bedtime  - Hypoglycemia protocol in place      ** Please notify Endocrine for any change and/or advance in diet**  ** Please call Endocrine for any BG related issues **     Discharge Planning:   TBD. Please notify endocrinology prior to discharge.

## 2024-01-08 NOTE — PLAN OF CARE
"      SICU PLAN OF CARE NOTE    Dx: Malignant carcinoid tumor of bronchus    Shift Events: K shifted with insulin, STAT KUB and Chest XR    Goals of Care: MAP >55, SBP> 90    Neuro: AAO x4, Follows Commands, and Moves All Extremities    Vital Signs: BP (!) 92/58   Pulse (!) 137   Temp 97.7 °F (36.5 °C) (Oral)   Resp (!) 22   Ht 5' 5" (1.651 m)   Wt 68.5 kg (151 lb 0.2 oz)   LMP  (LMP Unknown) Comment: BEAU/ NO BSO  1986  SpO2 97%   Breastfeeding No   BMI 25.13 kg/m²     Cardiac:AFIB    Respiratory: Nasal Cannula    Diet: Chopped, renal diet    Urine Output: Anuric 0 cc/shift    Drains: Chest Tube, total output 30 cc /  shift     Labs/Accuchecks: Daily labs/accuchecks q4.    Skin: Bed plugged in with pads and foams in place. Pt andrea to weight shift and turn with minimal assist. R. Back incision noted upon assessment. See flowsheet for skin and line details.       "

## 2024-01-08 NOTE — SUBJECTIVE & OBJECTIVE
Interval History/Significant Events: Overnight back in A-fib to 150s. Metoprolol IV pushes x3. Discussed with cards who recommended digoxin (renally dosed). Improved HR to 120s without lasting result. Similar results at repeat digoxin dose. KUB yesterday with large stool burden. Enema yesterday with success. Will repeat today. Patient states that she has abdominal discomfort but is not tender to palpation.    Follow-up For: Procedure(s) (LRB):  LYMPHADENECTOMY (Right)  THORACOTOMY (Right)  THORACOTOMY, WITH INITIAL THERAPEUTIC WEDGE RESECTION OF LUNG (Right)    Post-Operative Day: 6 Days Post-Op    Objective:     Vital Signs (Most Recent):  Temp: 97.6 °F (36.4 °C) (01/08/24 0730)  Pulse: (!) 135 (01/08/24 0802)  Resp: (!) 33 (01/08/24 0802)  BP: (!) 84/58 (01/08/24 0802)  SpO2: (!) 93 % (01/08/24 0802) Vital Signs (24h Range):  Temp:  [97.6 °F (36.4 °C)-98 °F (36.7 °C)] 97.6 °F (36.4 °C)  Pulse:  [] 135  Resp:  [13-40] 33  SpO2:  [75 %-100 %] 93 %  BP: ()/(42-72) 84/58  Arterial Line BP: (123-150)/(52-62) 147/61     Weight: 68.5 kg (151 lb 0.2 oz)  Body mass index is 25.13 kg/m².      Intake/Output Summary (Last 24 hours) at 1/8/2024 0832  Last data filed at 1/8/2024 0300  Gross per 24 hour   Intake 1010.12 ml   Output 50 ml   Net 960.12 ml          Physical Exam  Constitutional:       Appearance: Normal appearance.   Cardiovascular:      Rate and Rhythm: Normal rate and regular rhythm.      Pulses: Normal pulses.   Pulmonary:      Effort: Pulmonary effort is normal.   Abdominal:      General: Abdomen is flat. There is distension.      Tenderness: There is no abdominal tenderness.      Comments: Firm non-tender abdomen. Distended. KUB showing stool burden   Skin:     General: Skin is warm and dry.   Neurological:      General: No focal deficit present.      Mental Status: She is alert.            Vents:  Oxygen Concentration (%): 24 (01/07/24 9795)    Lines/Drains/Airways       Central Venous Catheter  Line  Duration                  Hemodialysis Catheter 01/25/23 1501 right internal jugular 347 days              Drain  Duration                  Chest Tube 01/02/24 1130 Tube - 1 Right Pleural 28 Fr. 5 days              Peripheral Intravenous Line  Duration                  Hemodialysis AV Fistula 09/01/23 0800 Left forearm 129 days         Peripheral IV - Single Lumen 01/04/24 1821 Right;Lateral Wrist 3 days         Peripheral IV - Single Lumen 01/05/24 1546 20 G;1 3/4 in Right Forearm 2 days                    Significant Labs:    CBC/Anemia Profile:  Recent Labs   Lab 01/06/24  1442 01/07/24  0320 01/08/24  0309   WBC 8.99 7.69 6.67   HGB 8.1* 8.4* 8.7*   HCT 24.4* 25.4* 25.1*    244 353   MCV 91 92 89   RDW 15.5* 15.2* 15.1*        Chemistries:  Recent Labs   Lab 01/07/24  0320 01/07/24  1132 01/08/24  0309   *  132* 134* 135*   K 4.9  4.9 4.9 5.6*   CL 89*  89* 90* 84*   CO2 23  23 22* 26   BUN 47*  47* 49* 64*   CREATININE 5.9*  5.9* 5.9* 7.1*   CALCIUM 9.4  9.4 9.2 9.7   ALBUMIN 2.4*  2.4*  --  2.5*   PROT 6.6  --  7.0   BILITOT 1.5*  --  3.1*   ALKPHOS 171*  --  490*   ALT <5*  --  34   AST 31  --  130*   MG 2.2  --  3.0*   PHOS 6.7*  6.7*  --  8.5*       All pertinent labs within the past 24 hours have been reviewed.    Significant Imaging:  I have reviewed all pertinent imaging results/findings within the past 24 hours.

## 2024-01-08 NOTE — PROGRESS NOTES
[facility]  Nephrology  Progress Note    Patient Name: Lily Green  MRN: [id number]  Admission Date: 1/2/2024  Hospital Length of Stay: 6 days  Attending Provider: Tan Thomson MD   Primary Care Physician: Pavel Calixto MD  Principal Problem:Malignant carcinoid tumor of bronchus    Subjective:     Interval History:   Patient back in A-fib this AM and hypotensive. HR 150s. Amiodarone infusion started. Net positive 960 mL.   No distress on exam. K 5.6. Last HD 1/5.    Review of patient's allergies indicates:   Allergen Reactions    Crestor [rosuvastatin] Swelling    Gluten protein      Current Facility-Administered Medications   Medication Frequency    acetaminophen 1,000 mg/100 mL (10 mg/mL) injection 1,000 mg Q8H    aluminum & magnesium hydroxide-simethicone 400-400-40 mg/5 mL suspension 30 mL Q6H PRN    amiodarone 360 mg/200 mL (1.8 mg/mL) infusion Continuous    amiodarone 360 mg/200 mL (1.8 mg/mL) infusion Continuous    amiodarone in dextrose 150 mg/100 mL (1.5 mg/mL) loading dose 150 mg Once    apixaban tablet 5 mg BID    atorvastatin tablet 40 mg QHS    bisacodyL suppository 10 mg Daily PRN    calcium gluconate 1 g in NS IVPB (premixed) Once    And    calcium gluconate 1 g in NS IVPB (premixed) Q10 Min PRN    dextrose 10% bolus 125 mL 125 mL PRN    dextrose 10% bolus 250 mL 250 mL PRN    dextrose 10% bolus 250 mL 250 mL PRN    glucagon (human recombinant) injection 1 mg PRN    glucose chewable tablet 16 g PRN    glucose chewable tablet 24 g PRN    HYDROmorphone injection 0.2 mg Q8H PRN    insulin aspart U-100 pen 0-5 Units Q4H PRN    LIDOcaine 5 % patch 1 patch Q24H    metoprolol injection 5 mg Q6H    midodrine tablet 15 mg TID WM    midodrine tablet 15 mg PRN    mupirocin 2 % ointment BID    ondansetron injection 8 mg Q8H PRN    [START ON 1/9/2024] pantoprazole injection 40 mg Daily    polyethylene glycol packet 17 g BID    senna-docusate 8.6-50 mg per tablet 1  tablet BID    sevelamer carbonate tablet 1,600 mg TID WM    simethicone chewable tablet 80 mg TID PRN    sodium chloride 0.9% bolus 500 mL 500 mL Once    torsemide tablet 40 mg Daily       Objective:     Vital Signs (Most Recent):  Temp: 97.6 °F (36.4 °C) (01/08/24 0730)  Pulse: (!) 131 (01/08/24 1100)  Resp: (!) 32 (01/08/24 1100)  BP: (!) 73/47 (01/08/24 1100)  SpO2: (!) 92 % (01/08/24 1100) Vital Signs (24h Range):  Temp:  [97.6 °F (36.4 °C)-98 °F (36.7 °C)] 97.6 °F (36.4 °C)  Pulse:  [] 131  Resp:  [13-40] 32  SpO2:  [75 %-100 %] 92 %  BP: ()/(42-72) 73/47     Weight: 68.5 kg (151 lb 0.2 oz) (01/07/24 0600)  Body mass index is 25.13 kg/m².  Body surface area is 1.77 meters squared.    I/O last 3 completed shifts:  In: 1010.1 [P.O.:490; IV Piggyback:520.1]  Out: 50 [Chest Tube:50]     Physical Exam  Vitals and nursing note reviewed.   Constitutional:       Appearance: Normal appearance.   HENT:      Head: Normocephalic and atraumatic.   Cardiovascular:      Rate and Rhythm: Normal rate and regular rhythm.      Pulses: Normal pulses.   Pulmonary:      Effort: Pulmonary effort is normal.   Abdominal:      General: Abdomen is flat. There is no distension.      Palpations: Abdomen is soft.      Tenderness: There is no abdominal tenderness.   Skin:     General: Skin is warm and dry.   Neurological:      General: No focal deficit present.      Mental Status: She is alert.          Significant Labs:  CBC:   Recent Labs   Lab 01/08/24  0309   WBC 6.67   RBC 2.83*   HGB 8.7*   HCT 25.1*      MCV 89   MCH 30.7   MCHC 34.7     CMP:   Recent Labs   Lab 01/08/24  0309   *   CALCIUM 9.7   ALBUMIN 2.5*   PROT 7.0   *   K 5.6*   CO2 26   CL 84*   BUN 64*   CREATININE 7.1*   ALKPHOS 490*   ALT 34   *   BILITOT 3.1*     All labs within the past 24 hours have been reviewed.     Assessment/Plan:     Renal/  ESRD (end stage renal disease)  S/p R thoracotomy due to RML carcinoid tumor. On HD ~  1 year. Last HD prior to presentation 1/1/24. Pt has NELY AVF placed on 10/23 that has not been cleared by vascular. Currently using RIJ TDC.     Nephrology History  iHD Schedule: TTS   Unit/MD: [facility]/ Dr. Vyas  Duration: 3.5 hours   EDW: 58 kg   Access: RIJ TDC  Residual Renal Function: minimal     Assessment:   - Recommend medical management of hyperkalemia at this time. Recommend Lokelma 10 g PO x 1.   - Once HR and BP improves, will plan for 10 hr SLED for clearance tonight.   - midodrine as needed with HD   - continue Torsemide   - continue Sevelamer 800 TIDWM  - Hgb 8.7 - will consider EPO with RRT.   - Renal diet, if not NPO   - Continue to monitor intake and output, daily weights   - Avoid nephrotoxic medication and renal dose medications to GFR    Oncology  * Malignant carcinoid tumor of bronchus  -management per primary         Thank you for your consult. I will follow-up with patient. Please contact us if you have any additional questions.    Makenzie Melissa DNP, FNP-C  Nephrology  [facility]

## 2024-01-08 NOTE — PROGRESS NOTES
[facility]  Cardiology  Progress Note    Patient Name: Lily Green  MRN: [id number]  Admission Date: 1/2/2024  Hospital Length of Stay: 6 days  Code Status: Full Code   Attending Physician: Tan Thomson MD   Primary Care Physician: Pavel Calixto MD  Expected Discharge Date: 1/11/2024  Principal Problem:Malignant carcinoid tumor of bronchus    Subjective:       Interval History: Continues to be in Afib with RVR with -150s. Now has ileus as well. Last HD Friday. BP 80-90s/50-60s. No chest pain or SOB.     Review of Systems   Constitutional: Positive for malaise/fatigue.   All other systems reviewed and are negative.  Objective:     Vital Signs (Most Recent):  Temp: 97.6 °F (36.4 °C) (01/08/24 0730)  Pulse: (!) 135 (01/08/24 0802)  Resp: (!) 33 (01/08/24 0802)  BP: (!) 84/58 (01/08/24 0802)  SpO2: (!) 93 % (01/08/24 0802) Vital Signs (24h Range):  Temp:  [97.6 °F (36.4 °C)-98 °F (36.7 °C)] 97.6 °F (36.4 °C)  Pulse:  [] 135  Resp:  [13-40] 33  SpO2:  [75 %-100 %] 93 %  BP: ()/(42-72) 84/58  Arterial Line BP: (123-150)/(57-62) 147/61     Weight: 68.5 kg (151 lb 0.2 oz)  Body mass index is 25.13 kg/m².     SpO2: (!) 93 %         Intake/Output Summary (Last 24 hours) at 1/8/2024 0938  Last data filed at 1/8/2024 0300  Gross per 24 hour   Intake 1010.12 ml   Output 50 ml   Net 960.12 ml       Lines/Drains/Airways       Central Venous Catheter Line  Duration                  Hemodialysis Catheter 01/25/23 1501 right internal jugular 347 days              Drain  Duration                  Chest Tube 01/02/24 1130 Tube - 1 Right Pleural 28 Fr. 5 days              Peripheral Intravenous Line  Duration                  Hemodialysis AV Fistula 09/01/23 0800 Left forearm 129 days         Peripheral IV - Single Lumen 01/04/24 1821 Right;Lateral Wrist 3 days         Peripheral IV - Single Lumen 01/05/24 1546 20 G;1 3/4 in Right Forearm 2 days                        Physical Exam  Constitutional:       General: She is not in acute distress.     Appearance: She is ill-appearing.   HENT:      Head: Normocephalic.      Mouth/Throat:      Mouth: Mucous membranes are dry.   Eyes:      Extraocular Movements: Extraocular movements intact.   Cardiovascular:      Rate and Rhythm: Tachycardia present. Rhythm irregular.      Pulses: Normal pulses.      Comments: R IJ catheter  R chest tube  Pulmonary:      Effort: Pulmonary effort is normal. No respiratory distress.   Abdominal:      General: There is distension.      Palpations: Abdomen is soft.      Tenderness: There is no abdominal tenderness.   Musculoskeletal:      Cervical back: Neck supple.      Right lower leg: No edema.      Left lower leg: No edema.   Skin:     General: Skin is warm.   Neurological:      Mental Status: She is oriented to person, place, and time.        Significant Labs: BMP:   Recent Labs   Lab 01/07/24 0320 01/07/24 1132 01/08/24  0309 01/08/24  1155   *  114* 143* 152*  --    *  132* 134* 135*  --    K 4.9  4.9 4.9 5.6* 5.2*   CL 89*  89* 90* 84*  --    CO2 23  23 22* 26  --    BUN 47*  47* 49* 64*  --    CREATININE 5.9*  5.9* 5.9* 7.1*  --    CALCIUM 9.4  9.4 9.2 9.7  --    MG 2.2  --  3.0*  --    , CMP   Recent Labs   Lab 01/07/24 0320 01/07/24 1132 01/08/24  0309 01/08/24  1155   *  132* 134* 135*  --    K 4.9  4.9 4.9 5.6* 5.2*   CL 89*  89* 90* 84*  --    CO2 23  23 22* 26  --    *  114* 143* 152*  --    BUN 47*  47* 49* 64*  --    CREATININE 5.9*  5.9* 5.9* 7.1*  --    CALCIUM 9.4  9.4 9.2 9.7  --    PROT 6.6  --  7.0  --    ALBUMIN 2.4*  2.4*  --  2.5*  --    BILITOT 1.5*  --  3.1*  --    ALKPHOS 171*  --  490*  --    AST 31  --  130*  --    ALT <5*  --  34  --    ANIONGAP 20*  20* 22* 25*  --    , CBC   Recent Labs   Lab 01/06/24  1442 01/07/24  0320 01/08/24  0309   WBC 8.99 7.69 6.67   HGB 8.1* 8.4* 8.7*   HCT 24.4* 25.4* 25.1*    244 353   ,  "INR No results for input(s): "INR", "PROTIME" in the last 48 hours., Lipid Panel No results for input(s): "CHOL", "HDL", "LDLCALC", "TRIG", "CHOLHDL" in the last 48 hours., and Troponin   Recent Labs   Lab 01/06/24  1548   TROPONINI 0.108*       Significant Imaging:   Reviewed  Assessment and Plan:       Atrial fibrillation with rapid ventricular response  Restart Amiodarone gtt @ 0.5 mg/min  If hypotensive despite Amiodarone gtt, can give another Digoxin 0.125 mcg IV x1; can repeat Digoxin level at the end of the week  Continue Eliquis for anticoagulation, but can switch to IV Heparin gtt if unable to take PO consistently with ileus  If becomes hemodynamically unstable, would emergently DCCV  Otherwise can electively DCCV once ileus resolved, chest tube removed, etc to limit risk of Afib recurrence  Optimize volume status and give more volume PRN  Monitor on telemetry; K>4, Mg>2        VTE Risk Mitigation (From admission, onward)           Ordered     heparin (porcine) injection 1,000 Units  As needed (PRN)         01/03/24 1123     IP VTE HIGH RISK PATIENT  Once         01/02/24 1226     Place CARMELITA hose  Until discontinued         01/02/24 1226     Place sequential compression device  Until discontinued         01/02/24 1226                    Case discussed with Dr Darren Ozuna MD.    Davis Ortiz MD  Cardiology  [facility] - Surgical Intensive [facility]    "

## 2024-01-08 NOTE — PLAN OF CARE
"SICU PLAN OF CARE NOTE    Dx: Malignant carcinoid tumor of bronchus    Goals of Care:  MAP >55, SBP>90    Vital Signs:  /71   Pulse (!) 121   Temp 98 °F (36.7 °C) (Oral)   Resp (!) 24   Ht 5' 5" (1.651 m)   Wt 68.5 kg (151 lb 0.2 oz)   LMP  (LMP Unknown) Comment: BAEU/ NO BSO  1986  SpO2 96%   Breastfeeding No   BMI 25.13 kg/m²     Cardiac:  AFIB    Resp:  SpO2 93-95% on 2 L NC    Neuro:  AAO x4, Follows Commands, and Moves All Extremities    Urine Output:  Anuric     Drains:  Chest Tube, total output 20 cc /  shift    Diet:  Dental Soft, renal, gluten free, low K     Labs/Accuchecks:  Accuchecks AC/HS, daily labs    Skin:  All skin remains free from injury. Up in chair for few hours.  Patient turns independently. ICU bed working correctly.    Shift Events:  Enema given. Back on A-fib.  See flowsheet for further assessment and details.  Family updated on current condition and plan of care, questions answered, and emotional support provided.   "

## 2024-01-08 NOTE — PT/OT/SLP PROGRESS
Occupational Therapy      Patient Name:  Lily Green   MRN:  [id number]    Patient not seen today secondary to Nurse/ ALYSSIA hold 2* low blood pressure & A-fib. Will follow-up 1/9 or as appropriate.    1/8/2024

## 2024-01-08 NOTE — SUBJECTIVE & OBJECTIVE
Interval History:   Back into afib RVR refractory to digoxin, on amio and metop.  Borderline pressures  Ileus picture continues, although slightly improved. BM yesterday with enema, KUB still with colonic distention. She has no appetite, intermittent abdominal pain.      Medications:  Continuous Infusions:    Scheduled Meds:   acetaminophen  650 mg Oral Q8H    [START ON 1/20/2024] amiodarone  200 mg Oral Daily    amiodarone  400 mg Oral BID    apixaban  5 mg Oral BID    atorvastatin  40 mg Oral QHS    calcium gluconate IVPB  1 g Intravenous Once    digoxin  125 mcg Intravenous Once    LIDOcaine  1 patch Transdermal Q24H    methocarbamoL  500 mg Oral BID    midodrine  15 mg Oral TID WM    mupirocin   Nasal BID    pantoprazole  40 mg Oral Daily    polyethylene glycol  17 g Oral BID    pregabalin  75 mg Oral Daily    senna-docusate 8.6-50 mg  1 tablet Oral BID    sevelamer carbonate  1,600 mg Oral TID WM    sodium phosphates  1 enema Rectal Once    torsemide  40 mg Oral Daily     PRN Meds:acetaminophen, aluminum & magnesium hydroxide-simethicone, bisacodyL, calcium gluconate IVPB **AND** calcium gluconate IVPB, dextrose 10%, dextrose 10%, [COMPLETED] dextrose 10% **AND** dextrose 10% **AND** [COMPLETED] insulin regular, glucagon (human recombinant), glucose, glucose, insulin aspart U-100, metoprolol, midodrine, ondansetron, oxyCODONE, oxyCODONE, simethicone     Review of patient's allergies indicates:   Allergen Reactions    Crestor [rosuvastatin] Swelling    Gluten protein      Objective:     Vital Signs (Most Recent):  Temp: 97.7 °F (36.5 °C) (01/08/24 0300)  Pulse: (!) 137 (01/08/24 0445)  Resp: (!) 22 (01/08/24 0445)  BP: (!) 92/58 (01/08/24 0445)  SpO2: 97 % (01/08/24 0445) Vital Signs (24h Range):  Temp:  [97.7 °F (36.5 °C)-98 °F (36.7 °C)] 97.7 °F (36.5 °C)  Pulse:  [] 137  Resp:  [13-40] 22  SpO2:  [75 %-100 %] 97 %  BP: ()/(42-72) 92/58  Arterial Line BP: (116-150)/(52-62) 147/61     Intake/Output  - Last 3 Shifts         01/06 0700 01/07 0659 01/07 0700 01/08 0659 01/08 0700 01/09 0659    P.O. 440 490     I.V. (mL/kg) 174.7 (2.6)      Other       IV Piggyback 494.1 520.1     Total Intake(mL/kg) 1108.8 (16.2) 1010.1 (14.7)     Other       Chest Tube 80 50     Total Output 80 50     Net +1028.8 +960.1            Stool Occurrence 1 x              SpO2: 97 %        Physical Exam  Vitals and nursing note reviewed.   Constitutional:       Appearance: Normal appearance.   HENT:      Head: Normocephalic and atraumatic.   Cardiovascular:      Rate and Rhythm: Normal rate and regular rhythm.      Comments: Right radial a line   Pulmonary:      Effort: Pulmonary effort is normal. No respiratory distress.      Comments: Right posterior chest incision c/d/I  Chest port sites c/d/I  Chest tube in place with serosanguinous output, no air leak   Abdominal:      General: Abdomen is flat. There is distension.      Palpations: Abdomen is soft. There is no mass.      Tenderness: There is abdominal tenderness.      Hernia: No hernia is present.      Comments: Mildly tender to palpation diffusely, distended and tympanic   Musculoskeletal:      Right lower leg: No edema.      Left lower leg: No edema.   Skin:     General: Skin is warm and dry.   Neurological:      General: No focal deficit present.      Mental Status: She is alert and oriented to person, place, and time.   Psychiatric:         Mood and Affect: Mood normal.         Behavior: Behavior normal.            Significant Labs:  BMP:   Recent Labs   Lab 01/08/24  0309   *   *   K 5.6*   CL 84*   CO2 26   BUN 64*   CREATININE 7.1*   CALCIUM 9.7   MG 3.0*       CBC:   Recent Labs   Lab 01/08/24  0309   WBC 6.67   RBC 2.83*   HGB 8.7*   HCT 25.1*      MCV 89   MCH 30.7   MCHC 34.7         Significant Diagnostics:  CT: I have reviewed all pertinent results/findings within the past 24 hours    VTE Risk Mitigation (From admission, onward)           Ordered      apixaban tablet 5 mg  2 times daily         01/07/24 0810     heparin (porcine) injection 1,000 Units  As needed (PRN)         01/03/24 1123     IP VTE HIGH RISK PATIENT  Once         01/02/24 1226     Place CARMELITA hose  Until discontinued         01/02/24 1226     Place sequential compression device  Until discontinued         01/02/24 1226

## 2024-01-08 NOTE — ASSESSMENT & PLAN NOTE
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 On HD ~ 1 year. Last HD prior to presentation 1/1/24. Pt has NELY AVF placed on 10/23 that has not been cleared by vascular. Currently using RIJ TDC.     Nephrology History  iHD Schedule: TTS   Unit/MD: [facility]/ Dr. Vyas  Duration: 3.5 hours   EDW: 58 kg   Access: RIJ TDC  Residual Renal Function: minimal     Assessment:   - Recommend medical management of hyperkalemia at this time. Recommend Lokelma 10 g PO x 1.   - Once HR and BP improves, will plan for 10 hr SLED for clearance tonight.   - midodrine as needed with HD   - continue Torsemide   - continue Sevelamer 800 TIDWM  - Hgb 8.7 - will consider EPO with RRT.   - Renal diet, if not NPO   - Continue to monitor intake and output, daily weights   - Avoid nephrotoxic medication and renal dose medications to GFR

## 2024-01-08 NOTE — PROGRESS NOTES
[facility]  Critical Care - Surgery  Progress Note    Patient Name: Lily Green  MRN: [id number]  Admission Date: 1/2/2024  Hospital Length of Stay: 6 days  Code Status: Full Code  Attending Provider: Tan Thomson MD  Primary Care Provider: Pavel Calixto MD   Principal Problem: Malignant carcinoid tumor of bronchus    Subjective:       Interval History/Significant Events: Overnight back in A-fib to 150s. Metoprolol IV pushes x3. Discussed with cards who recommended digoxin (renally dosed). Improved HR to 120s without lasting result. Similar results at repeat digoxin dose. KUB yesterday with large stool burden. Enema yesterday with success. Will repeat today. Patient states that she has abdominal discomfort but is not tender to palpation.    Follow-up For: Procedure(s) (LRB):  LYMPHADENECTOMY (Right)  THORACOTOMY (Right)  THORACOTOMY, WITH INITIAL THERAPEUTIC WEDGE RESECTION OF LUNG (Right)    Post-Operative Day: 6 Days Post-Op    Objective:     Vital Signs (Most Recent):  Temp: 97.6 °F (36.4 °C) (01/08/24 0730)  Pulse: (!) 135 (01/08/24 0802)  Resp: (!) 33 (01/08/24 0802)  BP: (!) 84/58 (01/08/24 0802)  SpO2: (!) 93 % (01/08/24 0802) Vital Signs (24h Range):  Temp:  [97.6 °F (36.4 °C)-98 °F (36.7 °C)] 97.6 °F (36.4 °C)  Pulse:  [] 135  Resp:  [13-40] 33  SpO2:  [75 %-100 %] 93 %  BP: ()/(42-72) 84/58  Arterial Line BP: (123-150)/(52-62) 147/61     Weight: 68.5 kg (151 lb 0.2 oz)  Body mass index is 25.13 kg/m².      Intake/Output Summary (Last 24 hours) at 1/8/2024 0832  Last data filed at 1/8/2024 0300  Gross per 24 hour   Intake 1010.12 ml   Output 50 ml   Net 960.12 ml          Physical Exam  Constitutional:       Appearance: Normal appearance.   Cardiovascular:      Rate and Rhythm: Normal rate and regular rhythm.      Pulses: Normal pulses.   Pulmonary:      Effort: Pulmonary effort is normal.   Abdominal:      General: Abdomen is flat. There is  distension.      Tenderness: There is no abdominal tenderness.      Comments: Firm non-tender abdomen. Distended. KUB showing stool burden   Skin:     General: Skin is warm and dry.   Neurological:      General: No focal deficit present.      Mental Status: She is alert.            Vents:  Oxygen Concentration (%): 24 (01/07/24 2215)    Lines/Drains/Airways       Central Venous Catheter Line  Duration                  Hemodialysis Catheter 01/25/23 1501 right internal jugular 347 days              Drain  Duration                  Chest Tube 01/02/24 1130 Tube - 1 Right Pleural 28 Fr. 5 days              Peripheral Intravenous Line  Duration                  Hemodialysis AV Fistula 09/01/23 0800 Left forearm 129 days         Peripheral IV - Single Lumen 01/04/24 1821 Right;Lateral Wrist 3 days         Peripheral IV - Single Lumen 01/05/24 1546 20 G;1 3/4 in Right Forearm 2 days                    Significant Labs:    CBC/Anemia Profile:  Recent Labs   Lab 01/06/24  1442 01/07/24  0320 01/08/24  0309   WBC 8.99 7.69 6.67   HGB 8.1* 8.4* 8.7*   HCT 24.4* 25.4* 25.1*    244 353   MCV 91 92 89   RDW 15.5* 15.2* 15.1*        Chemistries:  Recent Labs   Lab 01/07/24  0320 01/07/24  1132 01/08/24  0309   *  132* 134* 135*   K 4.9  4.9 4.9 5.6*   CL 89*  89* 90* 84*   CO2 23  23 22* 26   BUN 47*  47* 49* 64*   CREATININE 5.9*  5.9* 5.9* 7.1*   CALCIUM 9.4  9.4 9.2 9.7   ALBUMIN 2.4*  2.4*  --  2.5*   PROT 6.6  --  7.0   BILITOT 1.5*  --  3.1*   ALKPHOS 171*  --  490*   ALT <5*  --  34   AST 31  --  130*   MG 2.2  --  3.0*   PHOS 6.7*  6.7*  --  8.5*       All pertinent labs within the past 24 hours have been reviewed.    Significant Imaging:  I have reviewed all pertinent imaging results/findings within the past 24 hours.  Assessment/Plan:     Cardiac/Vascular  Atrial fibrillation with rapid ventricular response  72 yo female with carcinoid tumor of the right middle lobe s/p thoracotomy and resection  with mediastinal lymph node dissection 1/2/2023. Stepped up to the unit for persistent afib rvr.       Neuro/Psych:   -- Sedation: none  -- Pain:  Tylenol, Robaxin, Lyrica, Oxy 5s and 10s PRN             Cards:   -- HDS  -- BP goals SBP >90, MAPs >55  --  stepped up for AFib RVR, with stable blood pressure w/ tachycardia to the 140s, has now converted to NSR   - cardiology consulted, appreciate assistance.   - Amio gtt restarted with bolus on 1/8/24  - Metoprolol 25mg q8h   - Eliquis 5mg BID       Pulm:   -- Goal O2 sat > 90%  -- currently on RA  -- Chest tube placed by Thoracic team   - chest tube to water seal   - now with minimal output. Will d/c chest tube today      Renal:  -- history of ESRD, dialysis dependent.  Monday Wednesday Friday schedule while in the hospital.  -- trend BUN/Cr   -- K of 5.6 this morning.   - Shifted with insulin   - K checks q4h until dialysis   -- patient is anuric  -- permacath in place last exchanged 10/2023  -- fistula unable to be used  Recent Labs   Lab 01/07/24  0320 01/07/24  1132 01/08/24  0309   BUN 47*  47* 49* 64*   CREATININE 5.9*  5.9* 5.9* 7.1*           FEN / GI:   -- Replace lytes as needed  -- Nutrition:  NPO at the moment 2/2 ileus, Dietician consult placed for complex dietary restriction  -- MiraLax, docusate, mag citrate BID, enema      ID:   -- Tm: afebrile; WBC within normal limits  Recent Labs   Lab 01/06/24  1442 01/07/24  0320 01/08/24  0309   WBC 8.99 7.69 6.67       -- Abx none      Heme/Onc:  -- recheck hemoglobin  -- Daily CBC  Recent Labs   Lab 01/06/24  1442 01/07/24  0320 01/08/24  0309   HGB 8.1* 8.4* 8.7*    244 353           Endo:   -- history of diabetes  -- Gluc goal 140-180  -- SSI      PPx:   Feeding:  NPO  Analgesia/Sedation:  Pain is controlled, no sedation was  Thromboembolic prevention: apixaban   HOB >30: yes  Stress Ulcer ppx: n/a  Glucose control: Critical care goal 140-180 g/dl, SSI  Bowel reg: miralax  Invasive  Lines/Drains/Airway: Permacath, Chest tube, PIV x2  Deescalation: D/c chest tube        Dispo/Code Status/Palliative:   -- SICU/ Full Code          Critical care was time spent personally by me on the following activities: development of treatment plan with patient or surrogate and bedside caregivers, discussions with consultants, evaluation of patient's response to treatment, examination of patient, ordering and performing treatments and interventions, ordering and review of laboratory studies, ordering and review of radiographic studies, pulse oximetry, re-evaluation of patient's condition.  This critical care time did not overlap with that of any other provider or involve time for any procedures.     Prabhu Esqueda MD  Critical Care - Surgery  [facility]

## 2024-01-08 NOTE — PROGRESS NOTES
[facility] - [facility]  Thoracic Surgery  Progress Note    Subjective:     History of Present Illness:  No notes on file    Post-Op Info:  Procedure(s) (LRB):  LYMPHADENECTOMY (Right)  THORACOTOMY (Right)  THORACOTOMY, WITH INITIAL THERAPEUTIC WEDGE RESECTION OF LUNG (Right)   6 Days Post-Op     Interval History:   Back into afib RVR refractory to digoxin, on amio and metop.  Borderline pressures  Ileus picture continues, although slightly improved. BM yesterday with enema, KUB still with colonic distention. She has no appetite, intermittent abdominal pain.      Medications:  Continuous Infusions:    Scheduled Meds:   acetaminophen  650 mg Oral Q8H    [START ON 1/20/2024] amiodarone  200 mg Oral Daily    amiodarone  400 mg Oral BID    apixaban  5 mg Oral BID    atorvastatin  40 mg Oral QHS    calcium gluconate IVPB  1 g Intravenous Once    digoxin  125 mcg Intravenous Once    LIDOcaine  1 patch Transdermal Q24H    methocarbamoL  500 mg Oral BID    midodrine  15 mg Oral TID WM    mupirocin   Nasal BID    pantoprazole  40 mg Oral Daily    polyethylene glycol  17 g Oral BID    pregabalin  75 mg Oral Daily    senna-docusate 8.6-50 mg  1 tablet Oral BID    sevelamer carbonate  1,600 mg Oral TID WM    sodium phosphates  1 enema Rectal Once    torsemide  40 mg Oral Daily     PRN Meds:acetaminophen, aluminum & magnesium hydroxide-simethicone, bisacodyL, calcium gluconate IVPB **AND** calcium gluconate IVPB, dextrose 10%, dextrose 10%, [COMPLETED] dextrose 10% **AND** dextrose 10% **AND** [COMPLETED] insulin regular, glucagon (human recombinant), glucose, glucose, insulin aspart U-100, metoprolol, midodrine, ondansetron, oxyCODONE, oxyCODONE, simethicone     Review of patient's allergies indicates:   Allergen Reactions    Crestor [rosuvastatin] Swelling    Gluten protein      Objective:     Vital Signs (Most Recent):  Temp: 97.7 °F (36.5 °C) (01/08/24 0300)  Pulse: (!) 137 (01/08/24 0445)  Resp: (!) 22 (01/08/24  0445)  BP: (!) 92/58 (01/08/24 0445)  SpO2: 97 % (01/08/24 0445) Vital Signs (24h Range):  Temp:  [97.7 °F (36.5 °C)-98 °F (36.7 °C)] 97.7 °F (36.5 °C)  Pulse:  [] 137  Resp:  [13-40] 22  SpO2:  [75 %-100 %] 97 %  BP: ()/(42-72) 92/58  Arterial Line BP: (116-150)/(52-62) 147/61     Intake/Output - Last 3 Shifts         01/06 0700 01/07 0659 01/07 0700 01/08 0659 01/08 0700 01/09 0659    P.O. 440 490     I.V. (mL/kg) 174.7 (2.6)      Other       IV Piggyback 494.1 520.1     Total Intake(mL/kg) 1108.8 (16.2) 1010.1 (14.7)     Other       Chest Tube 80 50     Total Output 80 50     Net +1028.8 +960.1            Stool Occurrence 1 x              SpO2: 97 %       Physical Exam  Vitals and nursing note reviewed.   Constitutional:       Appearance: Normal appearance.   HENT:      Head: Normocephalic and atraumatic.   Cardiovascular:      Rate and Rhythm: Normal rate and regular rhythm.      Comments: Right radial a line   Pulmonary:      Effort: Pulmonary effort is normal. No respiratory distress.      Comments: Right posterior chest incision c/d/I  Chest port sites c/d/I  Chest tube in place with serosanguinous output, no air leak   Abdominal:      General: Abdomen is flat. There is distension.      Palpations: Abdomen is soft. There is no mass.      Tenderness: There is abdominal tenderness.      Hernia: No hernia is present.      Comments: Mildly tender to palpation diffusely, distended and tympanic   Musculoskeletal:      Right lower leg: No edema.      Left lower leg: No edema.   Skin:     General: Skin is warm and dry.   Neurological:      General: No focal deficit present.      Mental Status: She is alert and oriented to person, place, and time.   Psychiatric:         Mood and Affect: Mood normal.         Behavior: Behavior normal.            Significant Labs:  BMP:   Recent Labs   Lab 01/08/24  0309   *   *   K 5.6*   CL 84*   CO2 26   BUN 64*   CREATININE 7.1*   CALCIUM 9.7   MG 3.0*        CBC:   Recent Labs   Lab 01/08/24  0309   WBC 6.67   RBC 2.83*   HGB 8.7*   HCT 25.1*      MCV 89   MCH 30.7   MCHC 34.7         Significant Diagnostics:  CT: I have reviewed all pertinent results/findings within the past 24 hours    VTE Risk Mitigation (From admission, onward)           Ordered     apixaban tablet 5 mg  2 times daily         01/07/24 0810     heparin (porcine) injection 1,000 Units  As needed (PRN)         01/03/24 1123     IP VTE HIGH RISK PATIENT  Once         01/02/24 1226     Place CARMELITA hose  Until discontinued         01/02/24 1226     Place sequential compression device  Until discontinued         01/02/24 1226                  Assessment/Plan:     * Malignant carcinoid tumor of bronchus  74 yo female with ESRD (TTS), DDD, benign essential tremor, meniere's disease, HTN, HLD, DM2, GERD, Celiac's, IBS and 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 Stepped up to the ICU 1/5 with afib RVR refractory to IV metoprolol, started on amio gtt with conversion.     - CT to waterseal; will likely remove this am   - Daily CXR, FU this am   - Cards consult for refractory afib RVR, s/p chemical cardioversion  PO amio, metop.   Continue eliquis  Consult EP given refractory afib on multiple agents.   Echo 1/6 with normal function  - KUB with improved gastric distention, continued colonic distention.   - Continue bowel reg;repeat enema  - NPO  - Renal, gluten free diet  - Nephrology following, appreciate assistance. HD 1/5/2023. Wou;d favor SLED today given marginal pressures and electrolyte disturbances without signs of fluid overload, defer to nephrology  - Daily RFP   - MM pain control  - Home meds reviewed, restarted as appropriate   - PT/OT  - DVT ppx        Kat Flores MD  Thoracic Surgery  [facility] - [facility]

## 2024-01-08 NOTE — ASSESSMENT & PLAN NOTE
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 Stepped up to the unit for persistent afib rvr.       Neuro/Psych:   -- Sedation: none  -- Pain:  Tylenol, Robaxin, Lyrica, Oxy 5s and 10s PRN             Cards:   -- HDS  -- BP goals SBP >90, MAPs >55  --  stepped up for AFib RVR, with stable blood pressure w/ tachycardia to the 140s, has now converted to NSR   - cardiology consulted, appreciate assistance.   - Amio gtt restarted with bolus on 1/8/24  - Metoprolol 25mg q8h   - Eliquis 5mg BID       Pulm:   -- Goal O2 sat > 90%  -- currently on RA  -- Chest tube placed by Thoracic team   - chest tube to water seal   - now with minimal output. Will d/c chest tube today      Renal:  -- history of ESRD, dialysis dependent.  Monday Wednesday Friday schedule while in the hospital.  -- trend BUN/Cr   -- K of 5.6 this morning.   - Shifted with insulin   - K checks q4h until dialysis   -- patient is anuric  -- permacath in place last exchanged 10/2023  -- fistula unable to be used  Recent Labs   Lab 01/07/24  0320 01/07/24  1132 01/08/24  0309   BUN 47*  47* 49* 64*   CREATININE 5.9*  5.9* 5.9* 7.1*           FEN / GI:   -- Replace lytes as needed  -- Nutrition:  NPO at the moment 2/2 ileus, Dietician consult placed for complex dietary restriction  -- MiraLax, docusate, mag citrate BID, enema      ID:   -- Tm: afebrile; WBC within normal limits  Recent Labs   Lab 01/06/24  1442 01/07/24  0320 01/08/24  0309   WBC 8.99 7.69 6.67       -- Abx none      Heme/Onc:  -- recheck hemoglobin  -- Daily CBC  Recent Labs   Lab 01/06/24  1442 01/07/24  0320 01/08/24  0309   HGB 8.1* 8.4* 8.7*    244 353           Endo:   -- history of diabetes  -- Gluc goal 140-180  -- SSI      PPx:   Feeding:  NPO  Analgesia/Sedation:  Pain is controlled, no sedation was  Thromboembolic prevention: apixaban   HOB >30: yes  Stress Ulcer ppx: n/a  Glucose  control: Critical care goal 140-180 g/dl, SSI  Bowel reg: miralax  Invasive Lines/Drains/Airway: Permacath, Chest tube, PIV x2  Deescalation: D/c chest tube        Dispo/Code Status/Palliative:   -- SICU/ Full Code

## 2024-01-08 NOTE — SUBJECTIVE & OBJECTIVE
Follow-up For: Procedure(s) (LRB):  LYMPHADENECTOMY (Right)  THORACOTOMY (Right)  THORACOTOMY, WITH INITIAL THERAPEUTIC WEDGE RESECTION OF LUNG (Right)    Post-Operative Day: 3 Days Post-Op     Past Medical History:   Diagnosis Date    Anxiety     Celiac disease 2018    Celiac disease     Depression     Diabetes mellitus     Family history of breast cancer in mother      at age 68    Hyperlipidemia     Hypertension     Meniere disease     Meniere's disease, unspecified ear     Menopause     Peptic ulcer     Reflux esophagitis     Urinary tract infection     Vaginal infection     Vaginal Pap smear 2014    Pap/Hpv Negative        Past Surgical History:   Procedure Laterality Date    APPENDECTOMY      BREAST SURGERY      Tags    CARPAL TUNNEL RELEASE Bilateral 2017    ,     CLOSURE, COLOSTOMY Left 2023    Procedure: CLOSURE, COLOSTOMY;  Surgeon: Manuel Javier MD;  Location: [facility] OR;  Service: General;  Laterality: Left;    COLONOSCOPY  2018    Normal  ([facility])     COLOSTOMY Right 2023    Procedure: CREATION, COLOSTOMY;  Surgeon: Manuel Javier MD;  Location: [facility] OR;  Service: General;  Laterality: Right;    DILATION AND CURETTAGE OF UTERUS  1986    DUPUYTREN CONTRACTURE RELEASE Bilateral 2017    HYSTERECTOMY  1987    BEAU w/ appy, no BSO     INJECTION OF NEUROLYTIC AGENT AROUND LUMBAR SYMPATHETIC NERVE N/A 2022    Procedure: BLOCK, LUMBAR SYMPATHETIC;  Surgeon: Souleymane Rizo Jr., MD;  Location: [facility]GT;  Service: Pain Management;  Laterality: N/A;  no pacemaker   pt is diabetic     INJECTION OF NEUROLYTIC AGENT AROUND LUMBAR SYMPATHETIC NERVE N/A 2022    Procedure: BLOCK, LUMBAR SYMPATHETIC;  Surgeon: Souleymane Rizo Jr., MD;  Location: [facility]GT;  Service: Pain Management;  Laterality: N/A;  diabetic     INSERTION OF TUNNELED CENTRAL VENOUS HEMODIALYSIS CATHETER Right 2023    Procedure: INSERTION, CATHETER, HEMODIALYSIS, DUAL LUMEN;   Patient states she is scheduled for a pain management visit today.     Abdomen pain was not alone, was associated with back and hip also. Was all together.     She will let us know if she needs anything from us.     She was also seen in the er on Thursday.    Surgeon: Manuel Javier MD;  Location: [facility] OR;  Service: General;  Laterality: Right;    INSERTION, CATHETER, TUNNELED Left 1/28/2023    Procedure: Insertion,catheter,tunneled;  Surgeon: Watson Fontenot MD;  Location: [facility] OR;  Service: General;  Laterality: Left;    LAPAROTOMY, EXPLORATORY N/A 1/14/2023    Procedure: LAPAROTOMY, EXPLORATORY;  Surgeon: Manuel Javier MD;  Location: [facility] OR;  Service: General;  Laterality: N/A;    LAPAROTOMY, EXPLORATORY N/A 1/16/2023    Procedure: LAPAROTOMY, EXPLORATORY;  Surgeon: Manuel Javier MD;  Location: [facility] OR;  Service: General;  Laterality: N/A;    LYMPHADENECTOMY Right 1/2/2024    Procedure: LYMPHADENECTOMY;  Surgeon: Tan Thomson MD;  [facility];  Service: Cardiothoracic;  Laterality: Right;    REMOVAL OF CATHETER Right 1/28/2023    Procedure: REMOVAL, CATHETER;  Surgeon: Watson Fontenot MD;  Location: [facility] OR;  Service: General;  Laterality: Right;    REMOVAL, TUNNELED CATH Right 1/25/2023    Procedure: REMOVAL, TUNNELED CATH;  Surgeon: Manuel Javier MD;  Location: [facility] OR;  Service: General;  Laterality: Right;    ROBOTIC BRONCHOSCOPY N/A 10/20/2023    Procedure: ROBOTIC BRONCHOSCOPY;  Surgeon: Mayda Monzon MD;  Location: [facility] OR 2ND FLR;  Service: Pulmonary;  Laterality: N/A;    SHOULDER SURGERY  2009 & 2010    right rotator cuff    THORACOTOMY Right 1/2/2024    Procedure: THORACOTOMY;  Surgeon: Tan Thomson MD;  Location: [facility] OR 2ND FLR;  Service: Cardiothoracic;  Laterality: Right;    THORACOTOMY, WITH INITIAL THERAPEUTIC WEDGE RESECTION OF LUNG Right 1/2/2024    Procedure: THORACOTOMY, WITH INITIAL THERAPEUTIC WEDGE RESECTION OF LUNG;  Surgeon: Tan Thomson MD;  Location: [facility] OR 2ND FLR;  Service: Cardiothoracic;  Laterality: Right;    TONSILLECTOMY      UPPER GASTROINTESTINAL ENDOSCOPY  04/2018       Review of patient's allergies indicates:   Allergen Reactions    Crestor [rosuvastatin] Swelling    Gluten  protein        Family History       Problem Relation (Age of Onset)    Arthritis Father    Breast cancer Mother, Paternal Aunt    Cancer Mother, Paternal Aunt    Diabetes Paternal Grandfather    Hyperlipidemia Sister    Vision loss Father, Mother, Sister          Tobacco Use    Smoking status: Never    Smokeless tobacco: Never   Substance and Sexual Activity    Alcohol use: No    Drug use: Never    Sexual activity: Not Currently     Partners: Male     Birth control/protection: See Surgical Hx     Comment: :       Review of Systems  Objective:     Vital Signs (Most Recent):  Temp: 99.7 °F (37.6 °C) (01/05/24 1425)  Pulse: (!) 139 (01/05/24 1640)  Resp: (!) 28 (01/05/24 1630)  BP: (!) 88/52 (01/05/24 1630)  SpO2: 99 % (01/05/24 1630) Vital Signs (24h Range):  Temp:  [98.5 °F (36.9 °C)-99.7 °F (37.6 °C)] 99.7 °F (37.6 °C)  Pulse:  [] 139  Resp:  [17-33] 28  SpO2:  [91 %-100 %] 99 %  BP: ()/(50-63) 88/52     Weight: 64.6 kg (142 lb 6.7 oz)  Body mass index is 23.7 kg/m².      Intake/Output Summary (Last 24 hours) at 1/5/2024 1704  Last data filed at 1/5/2024 1200  Gross per 24 hour   Intake 1080 ml   Output 2225 ml   Net -1145 ml          Physical Exam  Constitutional:       Appearance: Normal appearance.   Cardiovascular:      Rate and Rhythm: Tachycardia present. Rhythm irregular.      Pulses: Normal pulses.   Pulmonary:      Effort: Pulmonary effort is normal.   Abdominal:      General: Abdomen is flat. There is no distension.      Palpations: Abdomen is soft.      Tenderness: There is no abdominal tenderness.   Skin:     General: Skin is warm and dry.   Neurological:      General: No focal deficit present.      Mental Status: She is alert.            Vents:       Lines/Drains/Airways       Central Venous Catheter Line  Duration                  Hemodialysis Catheter 01/25/23 1501 right internal jugular 345 days              Drain  Duration                  Ileostomy 01/16/23 1230  days          Chest Tube 01/02/24 1130 Tube - 1 Right Pleural 28 Fr. 3 days              Arterial Line  Duration             Arterial Line 01/02/24 0801 3 days              Peripheral Intravenous Line  Duration                  Hemodialysis AV Fistula 09/01/23 0800 Left forearm 126 days         Peripheral IV - Single Lumen 01/04/24 1821 Right;Lateral Wrist <1 day         Peripheral IV - Single Lumen 01/05/24 1546 20 G;1 3/4 in Right Forearm <1 day                    Significant Labs:    CBC/Anemia Profile:  Recent Labs   Lab 01/05/24  1345   WBC 10.88   HGB 9.1*   HCT 27.4*      MCV 93   RDW 15.3*        Chemistries:  Recent Labs   Lab 01/04/24 0315 01/05/24  0508 01/05/24  1345   * 132* 137   K 5.1 4.9 4.4   CL 96 93* 102   CO2 23 25 21*   BUN 35* 50* 17   CREATININE 4.4* 6.5* 3.2*   CALCIUM 9.4 9.2 9.1   ALBUMIN 3.2* 2.8* 3.0*   PROT  --   --  7.1   BILITOT  --   --  1.1*   ALKPHOS  --   --  95   ALT  --   --  <5*   AST  --   --  23   MG  --   --  1.9   PHOS 4.7* 4.5 2.9       BMP:   Recent Labs   Lab 01/05/24  1345   *      K 4.4      CO2 21*   BUN 17   CREATININE 3.2*   CALCIUM 9.1   MG 1.9     CMP:   Recent Labs   Lab 01/04/24 0315 01/05/24  0508 01/05/24  1345   * 132* 137   K 5.1 4.9 4.4   CL 96 93* 102   CO2 23 25 21*   * 103 155*   BUN 35* 50* 17   CREATININE 4.4* 6.5* 3.2*   CALCIUM 9.4 9.2 9.1   PROT  --   --  7.1   ALBUMIN 3.2* 2.8* 3.0*   BILITOT  --   --  1.1*   ALKPHOS  --   --  95   AST  --   --  23   ALT  --   --  <5*   ANIONGAP 14 14 14       Significant Imaging: I have reviewed all pertinent imaging results/findings within the past 24 hours.

## 2024-01-08 NOTE — SUBJECTIVE & OBJECTIVE
Interval History: Continues to be in Afib with RVR with -150s. Now has ileus as well. Last HD Friday. BP 80-90s/50-60s. No chest pain or SOB.     Review of Systems   Constitutional: Positive for malaise/fatigue.   All other systems reviewed and are negative.  Objective:     Vital Signs (Most Recent):  Temp: 97.6 °F (36.4 °C) (01/08/24 0730)  Pulse: (!) 135 (01/08/24 0802)  Resp: (!) 33 (01/08/24 0802)  BP: (!) 84/58 (01/08/24 0802)  SpO2: (!) 93 % (01/08/24 0802) Vital Signs (24h Range):  Temp:  [97.6 °F (36.4 °C)-98 °F (36.7 °C)] 97.6 °F (36.4 °C)  Pulse:  [] 135  Resp:  [13-40] 33  SpO2:  [75 %-100 %] 93 %  BP: ()/(42-72) 84/58  Arterial Line BP: (123-150)/(57-62) 147/61     Weight: 68.5 kg (151 lb 0.2 oz)  Body mass index is 25.13 kg/m².     SpO2: (!) 93 %         Intake/Output Summary (Last 24 hours) at 1/8/2024 0938  Last data filed at 1/8/2024 0300  Gross per 24 hour   Intake 1010.12 ml   Output 50 ml   Net 960.12 ml       Lines/Drains/Airways       Central Venous Catheter Line  Duration                  Hemodialysis Catheter 01/25/23 1501 right internal jugular 347 days              Drain  Duration                  Chest Tube 01/02/24 1130 Tube - 1 Right Pleural 28 Fr. 5 days              Peripheral Intravenous Line  Duration                  Hemodialysis AV Fistula 09/01/23 0800 Left forearm 129 days         Peripheral IV - Single Lumen 01/04/24 1821 Right;Lateral Wrist 3 days         Peripheral IV - Single Lumen 01/05/24 1546 20 G;1 3/4 in Right Forearm 2 days                       Physical Exam  Constitutional:       General: She is not in acute distress.     Appearance: She is ill-appearing.   HENT:      Head: Normocephalic.      Mouth/Throat:      Mouth: Mucous membranes are dry.   Eyes:      Extraocular Movements: Extraocular movements intact.   Cardiovascular:      Rate and Rhythm: Tachycardia present. Rhythm irregular.      Pulses: Normal pulses.      Comments: R IJ catheter  R chest  "tube  Pulmonary:      Effort: Pulmonary effort is normal. No respiratory distress.   Abdominal:      General: There is distension.      Palpations: Abdomen is soft.      Tenderness: There is no abdominal tenderness.   Musculoskeletal:      Cervical back: Neck supple.      Right lower leg: No edema.      Left lower leg: No edema.   Skin:     General: Skin is warm.   Neurological:      Mental Status: She is oriented to person, place, and time.        Significant Labs: BMP:   Recent Labs   Lab 01/07/24  0320 01/07/24  1132 01/08/24  0309 01/08/24  1155   *  114* 143* 152*  --    *  132* 134* 135*  --    K 4.9  4.9 4.9 5.6* 5.2*   CL 89*  89* 90* 84*  --    CO2 23  23 22* 26  --    BUN 47*  47* 49* 64*  --    CREATININE 5.9*  5.9* 5.9* 7.1*  --    CALCIUM 9.4  9.4 9.2 9.7  --    MG 2.2  --  3.0*  --    , CMP   Recent Labs   Lab 01/07/24  0320 01/07/24  1132 01/08/24  0309 01/08/24  1155   *  132* 134* 135*  --    K 4.9  4.9 4.9 5.6* 5.2*   CL 89*  89* 90* 84*  --    CO2 23  23 22* 26  --    *  114* 143* 152*  --    BUN 47*  47* 49* 64*  --    CREATININE 5.9*  5.9* 5.9* 7.1*  --    CALCIUM 9.4  9.4 9.2 9.7  --    PROT 6.6  --  7.0  --    ALBUMIN 2.4*  2.4*  --  2.5*  --    BILITOT 1.5*  --  3.1*  --    ALKPHOS 171*  --  490*  --    AST 31  --  130*  --    ALT <5*  --  34  --    ANIONGAP 20*  20* 22* 25*  --    , CBC   Recent Labs   Lab 01/06/24  1442 01/07/24  0320 01/08/24  0309   WBC 8.99 7.69 6.67   HGB 8.1* 8.4* 8.7*   HCT 24.4* 25.4* 25.1*    244 353   , INR No results for input(s): "INR", "PROTIME" in the last 48 hours., Lipid Panel No results for input(s): "CHOL", "HDL", "LDLCALC", "TRIG", "CHOLHDL" in the last 48 hours., and Troponin   Recent Labs   Lab 01/06/24  1548   TROPONINI 0.108*       Significant Imaging:   Reviewed  " Private car

## 2024-01-08 NOTE — SUBJECTIVE & OBJECTIVE
"Interval HPI:   Overnight events: No acute events overnight. Patient in room 06875 CVICU/83696 CVICU A. Blood glucose stable. BG at goal on current insulin regimen (SSI ). Steroid use- None. 6 Days Post-Op  Renal function- Abnormal - Creatinine 7.1   Vasopressors-  None       Endocrine will continue to follow and manage insulin orders inpatient.         Diet NPO     Eating:   NPO  Nausea: No  Hypoglycemia and intervention: No  Fever: No  TPN and/or TF: No      BP (!) 84/58   Pulse (!) 135   Temp 97.6 °F (36.4 °C) (Oral)   Resp (!) 33   Ht 5' 5" (1.651 m)   Wt 68.5 kg (151 lb 0.2 oz)   LMP  (LMP Unknown) Comment: BEAU/ NO BSO  1986  SpO2 (!) 93%   Breastfeeding No   BMI 25.13 kg/m²     Labs Reviewed and Include    Recent Labs   Lab 01/08/24  0309   *   CALCIUM 9.7   ALBUMIN 2.5*   PROT 7.0   *   K 5.6*   CO2 26   CL 84*   BUN 64*   CREATININE 7.1*   ALKPHOS 490*   ALT 34   *   BILITOT 3.1*     Lab Results   Component Value Date    WBC 6.67 01/08/2024    HGB 8.7 (L) 01/08/2024    HCT 25.1 (L) 01/08/2024    MCV 89 01/08/2024     01/08/2024     No results for input(s): "TSH", "FREET4" in the last 168 hours.  Lab Results   Component Value Date    HGBA1C 4.9 11/15/2023       Nutritional status:   Body mass index is 25.13 kg/m².  Lab Results   Component Value Date    ALBUMIN 2.5 (L) 01/08/2024    ALBUMIN 2.4 (L) 01/07/2024    ALBUMIN 2.4 (L) 01/07/2024     No results found for: "PREALBUMIN"    Estimated Creatinine Clearance: 6.9 mL/min (A) (based on SCr of 7.1 mg/dL (H)).    Accu-Checks  Recent Labs     01/06/24 2018 01/06/24  2315 01/07/24  0319 01/07/24  0753 01/07/24  1134 01/07/24  1924 01/07/24  2359 01/08/24  0338 01/08/24  0527 01/08/24  0609   POCTGLUCOSE 127* 139* 137* 123* 149* 187* 147* 165* 175* 234*       Current Medications and/or Treatments Impacting Glycemic Control  Immunotherapy:    Immunosuppressants       None          Steroids:   Hormones (From admission, onward) "      None          Pressors:    Autonomic Drugs (From admission, onward)      Start     Stop Route Frequency Ordered    01/06/24 0745  midodrine tablet 15 mg         -- Oral 3 times daily with meals 01/05/24 0917    01/05/24 1028  midodrine tablet 10 mg         -- Oral As needed (PRN) 01/05/24 0928          Hyperglycemia/Diabetes Medications:   Antihyperglycemics (From admission, onward)      Start     Stop Route Frequency Ordered    01/02/24 1338  insulin aspart U-100 pen 0-5 Units         -- SubQ Every 4 hours PRN 01/02/24 1238

## 2024-01-08 NOTE — PROGRESS NOTES
[facility]  Endocrinology  Progress Note    Admit Date: 1/2/2024     Reason for Consult: Management of T2DM, Hyperglycemia     Surgical Procedure and Date: 1/2/24--   LYMPHADENECTOMY (Right)  THORACOTOMY (Right)  THORACOTOMY, WITH INITIAL THERAPEUTIC WEDGE RESECTION OF LUNG (Right)    Diabetes diagnosis year: in her 50s    Home Diabetes Medications:    LDC SSI   Blood sugar 150 to 200, + 1 unit  Blood sugar 201 to 250, + 2 units  Blood sugar 251 to 300, + 3 units  Blood sugar 301 to 350, + 4 units   Blood sugar greater than 350, + 5 units    How often checking glucose at home? >4 x day   BG readings on regimen: 100-120s  Hypoglycemia on the regimen?  denies   Missed doses on regimen?  No    Diabetes Complications include:     Nephropathy     Complicating diabetes co morbidities:   ESRD and Glucocorticoid use       HPI:   Patient is a 73 y.o. female with DDD, benign essential tremor, meniere's disease, HTN, HLD, DM2, GERD, Celiac's, IBS who is found to have RML Carcinoid tumor.  Chest CT 10/18/23 revealed 1.2 x 1.1 cm right middle lobe nodule, previously 1.2 x 1.0 cm in July 2023. Underwent Robotic Bronchoscopy 10/20/23; path: RML nodule positive for well-differentiated neuroendocrine neoplasm, carcinoid tumor. PET/CT Cu64 11/17/23 showed known RML nodule with hypermetabolic activity. Now presents s/p thoracotomy with wedge resection of right lung. Endocrine consulted for BG management.         Interval HPI:   Overnight events: No acute events overnight. Patient in room 89972 CVICU/75786 CVICU A. Blood glucose stable. BG at goal on current insulin regimen (SSI ). Steroid use- None. 6 Days Post-Op  Renal function- Abnormal - Creatinine 7.1   Vasopressors-  None       Endocrine will continue to follow and manage insulin orders inpatient.         Diet NPO     Eating:   NPO  Nausea: No  Hypoglycemia and intervention: No  Fever: No  TPN and/or TF: No      BP (!) 84/58   Pulse (!) 135   Temp 97.6  "°F (36.4 °C) (Oral)   Resp (!) 33   Ht 5' 5" (1.651 m)   Wt 68.5 kg (151 lb 0.2 oz)   LMP  (LMP Unknown) Comment: BEAU/ NO BSO  1986  SpO2 (!) 93%   Breastfeeding No   BMI 25.13 kg/m²     Labs Reviewed and Include    Recent Labs   Lab 01/08/24  0309   *   CALCIUM 9.7   ALBUMIN 2.5*   PROT 7.0   *   K 5.6*   CO2 26   CL 84*   BUN 64*   CREATININE 7.1*   ALKPHOS 490*   ALT 34   *   BILITOT 3.1*     Lab Results   Component Value Date    WBC 6.67 01/08/2024    HGB 8.7 (L) 01/08/2024    HCT 25.1 (L) 01/08/2024    MCV 89 01/08/2024     01/08/2024     No results for input(s): "TSH", "FREET4" in the last 168 hours.  Lab Results   Component Value Date    HGBA1C 4.9 11/15/2023       Nutritional status:   Body mass index is 25.13 kg/m².  Lab Results   Component Value Date    ALBUMIN 2.5 (L) 01/08/2024    ALBUMIN 2.4 (L) 01/07/2024    ALBUMIN 2.4 (L) 01/07/2024     No results found for: "PREALBUMIN"    Estimated Creatinine Clearance: 6.9 mL/min (A) (based on SCr of 7.1 mg/dL (H)).    Accu-Checks  Recent Labs     01/06/24  2018 01/06/24  2315 01/07/24  0319 01/07/24  0753 01/07/24  1134 01/07/24  1924 01/07/24  2359 01/08/24  0338 01/08/24  0527 01/08/24  0609   POCTGLUCOSE 127* 139* 137* 123* 149* 187* 147* 165* 175* 234*       Current Medications and/or Treatments Impacting Glycemic Control  Immunotherapy:    Immunosuppressants       None          Steroids:   Hormones (From admission, onward)      None          Pressors:    Autonomic Drugs (From admission, onward)      Start     Stop Route Frequency Ordered    01/06/24 0745  midodrine tablet 15 mg         -- Oral 3 times daily with meals 01/05/24 2327    01/05/24 1028  midodrine tablet 10 mg         -- Oral As needed (PRN) 01/05/24 0928          Hyperglycemia/Diabetes Medications:   Antihyperglycemics (From admission, onward)      Start     Stop Route Frequency Ordered    01/02/24 1338  insulin aspart U-100 pen 0-5 Units         -- SubQ Every 4 " hours PRN 01/02/24 1238            ASSESSMENT and PLAN    Renal/  ESRD (end stage renal disease)    Titrate insulin slowly to avoid hypoglycemia as the risk of hypoglycemia increases with decreased creatinine clearance.      Oncology  * Malignant carcinoid tumor of bronchus  Managed per primary team        Endocrine  Type 2 diabetes mellitus with diabetic polyneuropathy, without long-term current use of insulin  BG goal: 140-180  BG excursions noted related to shifting potassium.     - Novolog Low dose correction with ISF 50 starting at 150 prn ac/hs   - POCT Glucose before meals and at bedtime  - Hypoglycemia protocol in place      ** Please notify Endocrine for any change and/or advance in diet**  ** Please call Endocrine for any BG related issues **     Discharge Planning:   TBD. Please notify endocrinology prior to discharge.             Pranav Perry, DNP, FNP  Endocrinology  [facility]

## 2024-01-08 NOTE — SUBJECTIVE & OBJECTIVE
Interval History:   Patient back in A-fib this AM and hypotensive. HR 150s. Amiodarone infusion started. Net positive 960 mL.   No distress on exam. K 5.6. Last HD 1/5.    Review of patient's allergies indicates:   Allergen Reactions    Crestor [rosuvastatin] Swelling    Gluten protein      Current Facility-Administered Medications   Medication Frequency    acetaminophen 1,000 mg/100 mL (10 mg/mL) injection 1,000 mg Q8H    aluminum & magnesium hydroxide-simethicone 400-400-40 mg/5 mL suspension 30 mL Q6H PRN    amiodarone 360 mg/200 mL (1.8 mg/mL) infusion Continuous    amiodarone 360 mg/200 mL (1.8 mg/mL) infusion Continuous    amiodarone in dextrose 150 mg/100 mL (1.5 mg/mL) loading dose 150 mg Once    apixaban tablet 5 mg BID    atorvastatin tablet 40 mg QHS    bisacodyL suppository 10 mg Daily PRN    calcium gluconate 1 g in NS IVPB (premixed) Once    And    calcium gluconate 1 g in NS IVPB (premixed) Q10 Min PRN    dextrose 10% bolus 125 mL 125 mL PRN    dextrose 10% bolus 250 mL 250 mL PRN    dextrose 10% bolus 250 mL 250 mL PRN    glucagon (human recombinant) injection 1 mg PRN    glucose chewable tablet 16 g PRN    glucose chewable tablet 24 g PRN    HYDROmorphone injection 0.2 mg Q8H PRN    insulin aspart U-100 pen 0-5 Units Q4H PRN    LIDOcaine 5 % patch 1 patch Q24H    metoprolol injection 5 mg Q6H    midodrine tablet 15 mg TID WM    midodrine tablet 15 mg PRN    mupirocin 2 % ointment BID    ondansetron injection 8 mg Q8H PRN    [START ON 1/9/2024] pantoprazole injection 40 mg Daily    polyethylene glycol packet 17 g BID    senna-docusate 8.6-50 mg per tablet 1 tablet BID    sevelamer carbonate tablet 1,600 mg TID WM    simethicone chewable tablet 80 mg TID PRN    sodium chloride 0.9% bolus 500 mL 500 mL Once    torsemide tablet 40 mg Daily       Objective:     Vital Signs (Most Recent):  Temp: 97.6 °F (36.4 °C) (01/08/24 0730)  Pulse: (!) 131 (01/08/24 1100)  Resp: (!) 32 (01/08/24 1100)  BP: (!) 73/47  (01/08/24 1100)  SpO2: (!) 92 % (01/08/24 1100) Vital Signs (24h Range):  Temp:  [97.6 °F (36.4 °C)-98 °F (36.7 °C)] 97.6 °F (36.4 °C)  Pulse:  [] 131  Resp:  [13-40] 32  SpO2:  [75 %-100 %] 92 %  BP: ()/(42-72) 73/47     Weight: 68.5 kg (151 lb 0.2 oz) (01/07/24 0600)  Body mass index is 25.13 kg/m².  Body surface area is 1.77 meters squared.    I/O last 3 completed shifts:  In: 1010.1 [P.O.:490; IV Piggyback:520.1]  Out: 50 [Chest Tube:50]     Physical Exam  Vitals and nursing note reviewed.   Constitutional:       Appearance: Normal appearance.   HENT:      Head: Normocephalic and atraumatic.   Cardiovascular:      Rate and Rhythm: Normal rate and regular rhythm.      Pulses: Normal pulses.   Pulmonary:      Effort: Pulmonary effort is normal.   Abdominal:      General: Abdomen is flat. There is no distension.      Palpations: Abdomen is soft.      Tenderness: There is no abdominal tenderness.   Skin:     General: Skin is warm and dry.   Neurological:      General: No focal deficit present.      Mental Status: She is alert.          Significant Labs:  CBC:   Recent Labs   Lab 01/08/24  0309   WBC 6.67   RBC 2.83*   HGB 8.7*   HCT 25.1*      MCV 89   MCH 30.7   MCHC 34.7     CMP:   Recent Labs   Lab 01/08/24  0309   *   CALCIUM 9.7   ALBUMIN 2.5*   PROT 7.0   *   K 5.6*   CO2 26   CL 84*   BUN 64*   CREATININE 7.1*   ALKPHOS 490*   ALT 34   *   BILITOT 3.1*     All labs within the past 24 hours have been reviewed.

## 2024-01-08 NOTE — ASSESSMENT & PLAN NOTE
Restart Amiodarone gtt @ 0.5 mg/min  If hypotensive despite Amiodarone gtt, can give another Digoxin 0.125 mcg IV x1; can repeat Digoxin level at the end of the week  Continue Eliquis for anticoagulation, but can switch to IV Heparin gtt if unable to take PO consistently with ileus  If becomes hemodynamically unstable, would emergently DCCV  Otherwise can electively DCCV once ileus resolved, chest tube removed, etc to limit risk of Afib recurrence  Update TTE once rate better controlled (sustained HR < 110 bpm)  Monitor on telemetry; K>4, Mg>2

## 2024-01-08 NOTE — PLAN OF CARE
[facility]  Discharge Reassessment    Primary Care Provider: Pavel Calixto MD    Expected Discharge Date: 1/11/2024    Reassessment (most recent)       Discharge Reassessment - 01/08/24 1138          Discharge Reassessment    Assessment Type Discharge Planning Reassessment     Communicated CAMPBELL with patient/caregiver Date not available/Unable to determine     Discharge Plan A Home;Home with family     Discharge Plan B Home Health                   Per MD's Note,   Interval History/Significant Events: Overnight back in A-fib to 150s. Metoprolol IV pushes x3. Discussed with cards who recommended digoxin (renally dosed). Improved HR to 120s without lasting result. Similar results at repeat digoxin dose. KUB yesterday with large stool burden. Enema yesterday with success. Will repeat today. Patient states that she has abdominal discomfort but is not tender to palpation     Discharge Plan A and Plan B have been determined by review of patient's clinical status, future medical and therapeutic needs, and coverage/benefits for post-acute care in coordination with multidisciplinary team members.     Alberto Fitch LMSW  Case Management [facility]

## 2024-01-08 NOTE — PT/OT/SLP PROGRESS
Physical Therapy      Patient Name:  Lily Green   MRN:  [id number]    Patient not seen today secondary to  (pt on hold per RN due to low BP.). Will follow-up at a later date.    1/8/2024  .

## 2024-01-08 NOTE — NURSING
Pt HR sustaining 135-150. SICU resident notified and instructed to give 0130 digoxin 125mg push now. Will notify physician if HR is unchanged or increases.

## 2024-01-09 LAB
ALBUMIN SERPL BCP-MCNC: 2.4 G/DL (ref 3.5–5.2)
ALBUMIN SERPL BCP-MCNC: 2.4 G/DL (ref 3.5–5.2)
ALBUMIN SERPL BCP-MCNC: 2.5 G/DL (ref 3.5–5.2)
ALBUMIN SERPL BCP-MCNC: 2.5 G/DL (ref 3.5–5.2)
ALP SERPL-CCNC: 383 U/L (ref 55–135)
ALT SERPL W/O P-5'-P-CCNC: 37 U/L (ref 10–44)
ANION GAP SERPL CALC-SCNC: 12 MMOL/L (ref 8–16)
ANION GAP SERPL CALC-SCNC: 12 MMOL/L (ref 8–16)
ANION GAP SERPL CALC-SCNC: 13 MMOL/L (ref 8–16)
ANION GAP SERPL CALC-SCNC: 14 MMOL/L (ref 8–16)
APTT PPP: 60.9 SEC (ref 21–32)
AST SERPL-CCNC: 105 U/L (ref 10–40)
BASOPHILS # BLD AUTO: 0.02 K/UL (ref 0–0.2)
BASOPHILS # BLD AUTO: 0.02 K/UL (ref 0–0.2)
BASOPHILS NFR BLD: 0.3 % (ref 0–1.9)
BASOPHILS NFR BLD: 0.3 % (ref 0–1.9)
BILIRUB SERPL-MCNC: 1.6 MG/DL (ref 0.1–1)
BUN SERPL-MCNC: 15 MG/DL (ref 8–23)
BUN SERPL-MCNC: 19 MG/DL (ref 8–23)
BUN SERPL-MCNC: 20 MG/DL (ref 8–23)
BUN SERPL-MCNC: 26 MG/DL (ref 8–23)
CALCIUM SERPL-MCNC: 8.2 MG/DL (ref 8.7–10.5)
CALCIUM SERPL-MCNC: 8.3 MG/DL (ref 8.7–10.5)
CALCIUM SERPL-MCNC: 8.4 MG/DL (ref 8.7–10.5)
CALCIUM SERPL-MCNC: 8.6 MG/DL (ref 8.7–10.5)
CHLORIDE SERPL-SCNC: 104 MMOL/L (ref 95–110)
CHLORIDE SERPL-SCNC: 104 MMOL/L (ref 95–110)
CHLORIDE SERPL-SCNC: 107 MMOL/L (ref 95–110)
CHLORIDE SERPL-SCNC: 109 MMOL/L (ref 95–110)
CO2 SERPL-SCNC: 20 MMOL/L (ref 23–29)
CO2 SERPL-SCNC: 22 MMOL/L (ref 23–29)
CO2 SERPL-SCNC: 23 MMOL/L (ref 23–29)
CO2 SERPL-SCNC: 24 MMOL/L (ref 23–29)
CREAT SERPL-MCNC: 2.1 MG/DL (ref 0.5–1.4)
CREAT SERPL-MCNC: 2.2 MG/DL (ref 0.5–1.4)
CREAT SERPL-MCNC: 2.5 MG/DL (ref 0.5–1.4)
CREAT SERPL-MCNC: 3 MG/DL (ref 0.5–1.4)
DIFFERENTIAL METHOD BLD: ABNORMAL
DIFFERENTIAL METHOD BLD: ABNORMAL
DIGOXIN SERPL-MCNC: 2 NG/ML (ref 0.8–2)
EOSINOPHIL # BLD AUTO: 0 K/UL (ref 0–0.5)
EOSINOPHIL # BLD AUTO: 0 K/UL (ref 0–0.5)
EOSINOPHIL NFR BLD: 0.4 % (ref 0–8)
EOSINOPHIL NFR BLD: 0.4 % (ref 0–8)
ERYTHROCYTE [DISTWIDTH] IN BLOOD BY AUTOMATED COUNT: 17.2 % (ref 11.5–14.5)
ERYTHROCYTE [DISTWIDTH] IN BLOOD BY AUTOMATED COUNT: 17.2 % (ref 11.5–14.5)
EST. GFR  (NO RACE VARIABLE): 15.9 ML/MIN/1.73 M^2
EST. GFR  (NO RACE VARIABLE): 19.8 ML/MIN/1.73 M^2
EST. GFR  (NO RACE VARIABLE): 23.1 ML/MIN/1.73 M^2
EST. GFR  (NO RACE VARIABLE): 24.4 ML/MIN/1.73 M^2
GLUCOSE SERPL-MCNC: 103 MG/DL (ref 70–110)
GLUCOSE SERPL-MCNC: 115 MG/DL (ref 70–110)
GLUCOSE SERPL-MCNC: 143 MG/DL (ref 70–110)
GLUCOSE SERPL-MCNC: 146 MG/DL (ref 70–110)
HCT VFR BLD AUTO: 24.3 % (ref 37–48.5)
HCT VFR BLD AUTO: 24.3 % (ref 37–48.5)
HGB BLD-MCNC: 8.4 G/DL (ref 12–16)
HGB BLD-MCNC: 8.4 G/DL (ref 12–16)
IMM GRANULOCYTES # BLD AUTO: 0.13 K/UL (ref 0–0.04)
IMM GRANULOCYTES # BLD AUTO: 0.13 K/UL (ref 0–0.04)
IMM GRANULOCYTES NFR BLD AUTO: 1.9 % (ref 0–0.5)
IMM GRANULOCYTES NFR BLD AUTO: 1.9 % (ref 0–0.5)
INR PPP: 2.2 (ref 0.8–1.2)
INR PPP: 5.1 (ref 0.8–1.2)
LYMPHOCYTES # BLD AUTO: 0.8 K/UL (ref 1–4.8)
LYMPHOCYTES # BLD AUTO: 0.8 K/UL (ref 1–4.8)
LYMPHOCYTES NFR BLD: 11.3 % (ref 18–48)
LYMPHOCYTES NFR BLD: 11.3 % (ref 18–48)
MAGNESIUM SERPL-MCNC: 2.2 MG/DL (ref 1.6–2.6)
MCH RBC QN AUTO: 28.4 PG (ref 27–31)
MCH RBC QN AUTO: 28.4 PG (ref 27–31)
MCHC RBC AUTO-ENTMCNC: 34.6 G/DL (ref 32–36)
MCHC RBC AUTO-ENTMCNC: 34.6 G/DL (ref 32–36)
MCV RBC AUTO: 82 FL (ref 82–98)
MCV RBC AUTO: 82 FL (ref 82–98)
MONOCYTES # BLD AUTO: 1.2 K/UL (ref 0.3–1)
MONOCYTES # BLD AUTO: 1.2 K/UL (ref 0.3–1)
MONOCYTES NFR BLD: 17 % (ref 4–15)
MONOCYTES NFR BLD: 17 % (ref 4–15)
NEUTROPHILS # BLD AUTO: 4.8 K/UL (ref 1.8–7.7)
NEUTROPHILS # BLD AUTO: 4.8 K/UL (ref 1.8–7.7)
NEUTROPHILS NFR BLD: 69.1 % (ref 38–73)
NEUTROPHILS NFR BLD: 69.1 % (ref 38–73)
NRBC BLD-RTO: 0 /100 WBC
NRBC BLD-RTO: 0 /100 WBC
PHOSPHATE SERPL-MCNC: 2.6 MG/DL (ref 2.7–4.5)
PHOSPHATE SERPL-MCNC: 2.8 MG/DL (ref 2.7–4.5)
PHOSPHATE SERPL-MCNC: 3.2 MG/DL (ref 2.7–4.5)
PHOSPHATE SERPL-MCNC: 3.4 MG/DL (ref 2.7–4.5)
PLATELET # BLD AUTO: 221 K/UL (ref 150–450)
PLATELET # BLD AUTO: 221 K/UL (ref 150–450)
PMV BLD AUTO: 10.6 FL (ref 9.2–12.9)
PMV BLD AUTO: 10.6 FL (ref 9.2–12.9)
POCT GLUCOSE: 115 MG/DL (ref 70–110)
POCT GLUCOSE: 147 MG/DL (ref 70–110)
POCT GLUCOSE: 149 MG/DL (ref 70–110)
POCT GLUCOSE: 152 MG/DL (ref 70–110)
POTASSIUM SERPL-SCNC: 4.5 MMOL/L (ref 3.5–5.1)
POTASSIUM SERPL-SCNC: 4.5 MMOL/L (ref 3.5–5.1)
POTASSIUM SERPL-SCNC: 4.6 MMOL/L (ref 3.5–5.1)
POTASSIUM SERPL-SCNC: 4.6 MMOL/L (ref 3.5–5.1)
POTASSIUM SERPL-SCNC: 4.9 MMOL/L (ref 3.5–5.1)
POTASSIUM SERPL-SCNC: 4.9 MMOL/L (ref 3.5–5.1)
PROT SERPL-MCNC: 6.1 G/DL (ref 6–8.4)
PROTHROMBIN TIME: 22.3 SEC (ref 9–12.5)
PROTHROMBIN TIME: 49.1 SEC (ref 9–12.5)
RBC # BLD AUTO: 2.96 M/UL (ref 4–5.4)
RBC # BLD AUTO: 2.96 M/UL (ref 4–5.4)
SODIUM SERPL-SCNC: 140 MMOL/L (ref 136–145)
SODIUM SERPL-SCNC: 141 MMOL/L (ref 136–145)
SODIUM SERPL-SCNC: 141 MMOL/L (ref 136–145)
SODIUM SERPL-SCNC: 142 MMOL/L (ref 136–145)
WBC # BLD AUTO: 6.93 K/UL (ref 3.9–12.7)
WBC # BLD AUTO: 6.93 K/UL (ref 3.9–12.7)

## 2024-01-09 PROCEDURE — 80053 COMPREHEN METABOLIC PANEL: CPT | Performed by: SURGERY

## 2024-01-09 PROCEDURE — 84100 ASSAY OF PHOSPHORUS: CPT | Performed by: SURGERY

## 2024-01-09 PROCEDURE — 80069 RENAL FUNCTION PANEL: CPT | Performed by: SURGERY

## 2024-01-09 PROCEDURE — 25000003 PHARM REV CODE 250

## 2024-01-09 PROCEDURE — 80162 ASSAY OF DIGOXIN TOTAL: CPT | Performed by: STUDENT IN AN ORGANIZED HEALTH CARE EDUCATION/TRAINING PROGRAM

## 2024-01-09 PROCEDURE — 85730 THROMBOPLASTIN TIME PARTIAL: CPT | Performed by: SURGERY

## 2024-01-09 PROCEDURE — 90945 DIALYSIS ONE EVALUATION: CPT

## 2024-01-09 PROCEDURE — 11000001 HC ACUTE MED/SURG PRIVATE ROOM

## 2024-01-09 PROCEDURE — 99233 SBSQ HOSP IP/OBS HIGH 50: CPT | Mod: ,,, | Performed by: INTERNAL MEDICINE

## 2024-01-09 PROCEDURE — 99232 SBSQ HOSP IP/OBS MODERATE 35: CPT | Mod: GC,,, | Performed by: INTERNAL MEDICINE

## 2024-01-09 PROCEDURE — 63600175 PHARM REV CODE 636 W HCPCS

## 2024-01-09 PROCEDURE — 63600175 PHARM REV CODE 636 W HCPCS: Performed by: SURGERY

## 2024-01-09 PROCEDURE — 25000003 PHARM REV CODE 250: Performed by: SURGERY

## 2024-01-09 PROCEDURE — 97535 SELF CARE MNGMENT TRAINING: CPT

## 2024-01-09 PROCEDURE — 85610 PROTHROMBIN TIME: CPT

## 2024-01-09 PROCEDURE — 80100008 HC CRRT DAILY MAINTENANCE

## 2024-01-09 PROCEDURE — 99900035 HC TECH TIME PER 15 MIN (STAT)

## 2024-01-09 PROCEDURE — 97168 OT RE-EVAL EST PLAN CARE: CPT

## 2024-01-09 PROCEDURE — 94761 N-INVAS EAR/PLS OXIMETRY MLT: CPT

## 2024-01-09 PROCEDURE — C9113 INJ PANTOPRAZOLE SODIUM, VIA: HCPCS | Performed by: SURGERY

## 2024-01-09 PROCEDURE — 83735 ASSAY OF MAGNESIUM: CPT | Performed by: SURGERY

## 2024-01-09 PROCEDURE — 80069 RENAL FUNCTION PANEL: CPT | Mod: 91 | Performed by: SURGERY

## 2024-01-09 PROCEDURE — 99232 SBSQ HOSP IP/OBS MODERATE 35: CPT | Mod: ,,, | Performed by: NURSE PRACTITIONER

## 2024-01-09 PROCEDURE — 97164 PT RE-EVAL EST PLAN CARE: CPT

## 2024-01-09 PROCEDURE — 97530 THERAPEUTIC ACTIVITIES: CPT

## 2024-01-09 PROCEDURE — 99291 CRITICAL CARE FIRST HOUR: CPT | Mod: ,,, | Performed by: ANESTHESIOLOGY

## 2024-01-09 PROCEDURE — 85025 COMPLETE CBC W/AUTO DIFF WBC: CPT | Performed by: SURGERY

## 2024-01-09 RX ORDER — SODIUM CHLORIDE, SODIUM LACTATE, POTASSIUM CHLORIDE, CALCIUM CHLORIDE 600; 310; 30; 20 MG/100ML; MG/100ML; MG/100ML; MG/100ML
INJECTION, SOLUTION INTRAVENOUS CONTINUOUS
Status: DISCONTINUED | OUTPATIENT
Start: 2024-01-09 | End: 2024-01-09

## 2024-01-09 RX ORDER — SODIUM CHLORIDE, SODIUM LACTATE, POTASSIUM CHLORIDE, CALCIUM CHLORIDE 600; 310; 30; 20 MG/100ML; MG/100ML; MG/100ML; MG/100ML
INJECTION, SOLUTION INTRAVENOUS CONTINUOUS
Status: DISCONTINUED | OUTPATIENT
Start: 2024-01-09 | End: 2024-01-10

## 2024-01-09 RX ORDER — METOPROLOL TARTRATE 1 MG/ML
10 INJECTION, SOLUTION INTRAVENOUS EVERY 6 HOURS
Status: DISCONTINUED | OUTPATIENT
Start: 2024-01-10 | End: 2024-01-10

## 2024-01-09 RX ADMIN — AMIODARONE HYDROCHLORIDE 0.5 MG/MIN: 1.8 INJECTION, SOLUTION INTRAVENOUS at 05:01

## 2024-01-09 RX ADMIN — METOROPROLOL TARTRATE 10 MG: 5 INJECTION, SOLUTION INTRAVENOUS at 11:01

## 2024-01-09 RX ADMIN — LIDOCAINE 5% 1 PATCH: 700 PATCH TOPICAL at 08:01

## 2024-01-09 RX ADMIN — PHYTONADIONE 10 MG: 10 INJECTION, EMULSION INTRAMUSCULAR; INTRAVENOUS; SUBCUTANEOUS at 12:01

## 2024-01-09 RX ADMIN — METOROPROLOL TARTRATE 5 MG: 5 INJECTION, SOLUTION INTRAVENOUS at 05:01

## 2024-01-09 RX ADMIN — MUPIROCIN: 20 OINTMENT TOPICAL at 08:01

## 2024-01-09 RX ADMIN — HYDROMORPHONE HYDROCHLORIDE 0.2 MG: 0.5 INJECTION, SOLUTION INTRAMUSCULAR; INTRAVENOUS; SUBCUTANEOUS at 12:01

## 2024-01-09 RX ADMIN — METOROPROLOL TARTRATE 5 MG: 5 INJECTION, SOLUTION INTRAVENOUS at 11:01

## 2024-01-09 RX ADMIN — SODIUM CHLORIDE, POTASSIUM CHLORIDE, SODIUM LACTATE AND CALCIUM CHLORIDE: 600; 310; 30; 20 INJECTION, SOLUTION INTRAVENOUS at 11:01

## 2024-01-09 RX ADMIN — PANTOPRAZOLE SODIUM 40 MG: 40 INJECTION, POWDER, FOR SOLUTION INTRAVENOUS at 08:01

## 2024-01-09 RX ADMIN — ACETAMINOPHEN 1000 MG: 10 INJECTION, SOLUTION INTRAVENOUS at 05:01
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
## 2024-01-09 NOTE — PROGRESS NOTES
[facility]  Critical Care - Surgery  Progress Note    Patient Name: Lily Green  MRN: [id number]  Admission Date: 1/2/2024  Hospital Length of Stay: 7 days  Code Status: Full Code  Attending Provider: Tan Thomson MD  Primary Care Provider: Pavel Calixto MD   Principal Problem: Malignant carcinoid tumor of bronchus    Subjective:     Interval History/Significant Events: NAEON. Put out >4L via NGT yesterday. CT AP showing SBO. US showing distended gallbladder with no signs of acute cholecystitis. Patient abdominal exam improved. Complains of shoulder pain and Right sided chest pain which is reproducible with palpation.     Follow-up For: Procedure(s) (LRB):  LYMPHADENECTOMY (Right)  THORACOTOMY (Right)  THORACOTOMY, WITH INITIAL THERAPEUTIC WEDGE RESECTION OF LUNG (Right)    Post-Operative Day: 7 Days Post-Op    Objective:     Vital Signs (Most Recent):  Temp: 97.7 °F (36.5 °C) (01/09/24 0715)  Pulse: 81 (01/09/24 0715)  Resp: (!) 24 (01/09/24 0715)  BP: 129/62 (01/09/24 0700)  SpO2: (!) 91 % (01/09/24 0715) Vital Signs (24h Range):  Temp:  [97.4 °F (36.3 °C)-97.8 °F (36.6 °C)] 97.7 °F (36.5 °C)  Pulse:  [] 81  Resp:  [9-71] 24  SpO2:  [87 %-98 %] 91 %  BP: ()/(41-62) 129/62  Arterial Line BP: ()/(39-67) 163/59     Weight: 68.5 kg (151 lb 0.2 oz)  Body mass index is 25.13 kg/m².      Intake/Output Summary (Last 24 hours) at 1/9/2024 0805  Last data filed at 1/9/2024 0700  Gross per 24 hour   Intake 5330.76 ml   Output 6156 ml   Net -825.24 ml          Physical Exam  Constitutional:       Appearance: Normal appearance. She is ill-appearing.   HENT:      Nose:      Comments: NGT in place  Cardiovascular:      Rate and Rhythm: Normal rate and regular rhythm.      Pulses: Normal pulses.   Pulmonary:      Effort: Pulmonary effort is normal.   Abdominal:      General: Abdomen is flat.      Palpations: Abdomen is soft.      Tenderness: There is no abdominal  tenderness.      Comments: CT showing SBO. Abdomen softer than yesterday. NG decompression continued.    Musculoskeletal:         General: Tenderness (R chest) present.      Comments: Right shoulder tenderness. Pt states that she has a trigger point.    Skin:     General: Skin is warm and dry.   Neurological:      General: No focal deficit present.      Mental Status: She is alert and oriented to person, place, and time.            Vents:  Oxygen Concentration (%): 28 (01/08/24 2344)    Lines/Drains/Airways       Central Venous Catheter Line  Duration                  Hemodialysis Catheter 01/25/23 1501 right internal jugular 348 days              Drain  Duration                  NG/OG Tube 01/08/24 1303 nasogastric Left nostril <1 day              Arterial Line  Duration             Arterial Line 01/08/24 1324 Right Radial <1 day              Peripheral Intravenous Line  Duration                  Hemodialysis AV Fistula 09/01/23 0800 Left forearm 130 days         Peripheral IV - Single Lumen 01/08/24 1108 22 G Posterior;Right Wrist <1 day         Peripheral IV - Single Lumen 01/08/24 1357 20 G Posterior;Right Forearm <1 day         Peripheral IV - Single Lumen 01/09/24 0542 20 G Anterior;Right Forearm <1 day                    Significant Labs:    CBC/Anemia Profile:  Recent Labs   Lab 01/08/24  1314 01/08/24  1646 01/09/24  0351   WBC 8.05 7.59 6.93  6.93   HGB 8.2* 7.0* 8.4*  8.4*   HCT 23.5* 20.0* 24.3*  24.3*    304 221  221   MCV 88 86 82  82   RDW 15.3* 15.1* 17.2*  17.2*        Chemistries:  Recent Labs   Lab 01/08/24  1155 01/08/24  1310 01/08/24  1646 01/08/24  2106 01/09/24  0044 01/09/24  0351   * 134*  135*   < > 140 140 141   K 5.2*  5.2* 4.8  4.9   < > 4.5  4.5 4.5  4.5 4.6   CL 78* 81*  83*   < > 101 104 104   CO2 28 26  26   < > 25 24 23   BUN 72* 75*  74*   < > 36* 19 15   CREATININE 7.3* 7.2*  7.6*   < > 3.6* 2.1* 2.2*   CALCIUM 9.1 8.6*  8.7   < > 8.4* 8.3* 8.6*    ALBUMIN 2.4* 2.1*  2.1*   < > 2.5* 2.5* 2.5*   PROT 6.8 5.9*  --   --   --  6.1   BILITOT 3.4* 3.1*  --   --   --  1.6*   ALKPHOS 563* 514*  --   --   --  383*   ALT 50* 46*  --   --   --  37   * 154*  --   --   --  105*   MG 3.3* 3.1*  --  2.4  --  2.2   PHOS 10.7* 10.6*  10.8*   < > 4.8* 2.8 2.6*    < > = values in this interval not displayed.       All pertinent labs within the past 24 hours have been reviewed.    Significant Imaging:  I have reviewed all pertinent imaging results/findings within the past 24 hours.  Assessment/Plan:     Cardiac/Vascular  Atrial fibrillation with rapid ventricular response  72 yo female with 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 Stepped up to the unit for persistent afib rvr.       Neuro/Psych:   -- Sedation: none  -- Pain:  Tylenol, Robaxin, Lyrica, dilaudid 0.2 IV PRN             Cards:   -- HDS  -- BP goals SBP >90, MAPs >55   - Phenylephrine infusion started on 1/8/24 for support.     - Weaned off on 1/8/24 @ 2100   - Fluid resuscitated for signs of hypovolemia.     - 2L NS, 500 Albumin  --  stepped up for AFib RVR, with stable blood pressure w/ tachycardia to the 140s, has now converted to NSR   - cardiology consulted, appreciate assistance.   - Amio gtt restarted with bolus on 1/8/24 - back in NSR on 1/9   - transition to PO Amio   - Metoprolol 5mg IV q6h   - heparin low intensity w/ nomogram      Pulm:   -- Goal O2 sat > 90%  -- currently on 3L NC  -- Chest tube placed by Thoracic team   - chest tube d/c'd 1/8/24      Renal:  -- history of ESRD, dialysis dependent.  Monday Wednesday Friday schedule while in the hospital.  -- trend BUN/Cr  -- Dialyzed on Monday   -- K of 4.6 this morning.   - stopped q4h K labs.   -- patient is anuric  -- permacath in place last exchanged 10/2023  -- fistula unable to be used  Recent Labs   Lab 01/08/24  2106 01/09/24  0044 01/09/24  0351   BUN 36*  19 15   CREATININE 3.6* 2.1* 2.2*           FEN / GI:   -- NGT to LIWS   - put out 4.5L since placement  -- Chloride normalized with fluid replacement  -- CT on 1/8/24 demonstrating SBO  -- US on 1/8/24 showing distended gallbladder without signs of acute cholecystitis   -- Replace lytes as needed  -- Nutrition:  NPO 2/2 SBO, Dietician consult placed for complex dietary restriction      ID:   -- Tm: afebrile; WBC within normal limits  Recent Labs   Lab 01/08/24  1314 01/08/24  1646 01/09/24  0351   WBC 8.05 7.59 6.93  6.93       -- Abx none      Heme/Onc:  -- recheck hemoglobin  -- Daily CBC  -- PT/IN 4.2 yesterday.    - STAT redraw this morning  Recent Labs   Lab 01/08/24  1314 01/08/24  1348 01/08/24  1646 01/08/24  2106 01/09/24  0351   HGB 8.2*  --  7.0*  --  8.4*  8.4*     --  304  --  221  221   APTT  --  41.5*  --  51.9* 60.9*   INR  --  4.2*  --   --   --            Endo:   -- history of diabetes  -- Gluc goal 140-180  -- SSI      PPx:   Feeding:  NPO  Analgesia/Sedation:  Pain is controlled, no sedation  Thromboembolic prevention: heparin infusion  HOB >30: yes  Stress Ulcer ppx: pantoprazole IV  Glucose control: Critical care goal 140-180 g/dl, SSI  Bowel reg: n/a SBO. NGT decompression  Invasive Lines/Drains/Airway: Permacath, PIV x2, NGT  Deescalation: Amio transition to PO. Possible step down        Dispo/Code Status/Palliative:   -- SICU/ Full Code          Critical care was time spent personally by me on the following activities: development of treatment plan with patient or surrogate and bedside caregivers, discussions with consultants, evaluation of patient's response to treatment, examination of patient, ordering and performing treatments and interventions, ordering and review of laboratory studies, ordering and review of radiographic studies, pulse oximetry, re-evaluation of patient's condition.  This critical care time did not overlap with that of any other provider or involve time for  any procedures.     Prabhu Esqueda MD  Critical Care - Surgery  [facility]

## 2024-01-09 NOTE — ASSESSMENT & PLAN NOTE
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 Stepped up to the unit for persistent afib rvr.       Neuro/Psych:   -- Sedation: none  -- Pain:  Tylenol, Robaxin, Lyrica, dilaudid 0.2 IV PRN             Cards:   -- HDS  -- BP goals SBP >90, MAPs >55   - Phenylephrine infusion started on 1/8/24 for support.     - Weaned off on 1/8/24 @ 2100   - Fluid resuscitated for signs of hypovolemia.     - 2L NS, 500 Albumin  --  stepped up for AFib RVR, with stable blood pressure w/ tachycardia to the 140s, has now converted to NSR   - cardiology consulted, appreciate assistance.   - Amio gtt restarted with bolus on 1/8/24 - back in NSR on 1/9   - transition to PO Amio   - Metoprolol 5mg IV q6h   - heparin low intensity w/ nomogram      Pulm:   -- Goal O2 sat > 90%  -- currently on 3L NC  -- Chest tube placed by Thoracic team   - chest tube d/c'd 1/8/24      Renal:  -- history of ESRD, dialysis dependent.  Monday Wednesday Friday schedule while in the hospital.  -- trend BUN/Cr  -- Dialyzed on Monday   -- K of 4.6 this morning.   - stopped q4h K labs.   -- patient is anuric  -- permacath in place last exchanged 10/2023  -- fistula unable to be used  Recent Labs   Lab 01/08/24  2106 01/09/24  0044 01/09/24  0351   BUN 36* 19 15   CREATININE 3.6* 2.1* 2.2*           FEN / GI:   -- NGT to LIWS   - put out 4.5L since placement  -- Chloride normalized with fluid replacement  -- CT on 1/8/24 demonstrating SBO  -- US on 1/8/24 showing distended gallbladder without signs of acute cholecystitis   -- Replace lytes as needed  -- Nutrition:  NPO 2/2 SBO, Dietician consult placed for complex dietary restriction      ID:   -- Tm: afebrile; WBC within normal limits  Recent Labs   Lab 01/08/24  1314 01/08/24  1646 01/09/24  0351   WBC 8.05 7.59 6.93  6.93       -- Abx none      Heme/Onc:  -- recheck hemoglobin  -- Daily CBC  -- PT/IN 4.2 yesterday.    -  STAT redraw this morning  Recent Labs   Lab 01/08/24  1314 01/08/24  1348 01/08/24  1646 01/08/24  2106 01/09/24  0351   HGB 8.2*  --  7.0*  --  8.4*  8.4*     --  304  --  221  221   APTT  --  41.5*  --  51.9* 60.9*   INR  --  4.2*  --   --   --            Endo:   -- history of diabetes  -- Gluc goal 140-180  -- SSI      PPx:   Feeding:  NPO  Analgesia/Sedation:  Pain is controlled, no sedation  Thromboembolic prevention: heparin infusion  HOB >30: yes  Stress Ulcer ppx: pantoprazole IV  Glucose control: Critical care goal 140-180 g/dl, SSI  Bowel reg: n/a SBO. NGT decompression  Invasive Lines/Drains/Airway: Permacath, PIV x2, NGT  Deescalation: Amio transition to PO. Possible step down        Dispo/Code Status/Palliative:   -- SICU/ Full Code

## 2024-01-09 NOTE — SUBJECTIVE & OBJECTIVE
Interval History: 10 hour SLED overnight for clearance. NGT placed yesterday with 4.6L output. Received 1 unit PRBC and 1L NS. LR @ 50ml/hour. Net -825. Continues on amio gtt, rate controlled.     Review of patient's allergies indicates:   Allergen Reactions    Crestor [rosuvastatin] Swelling    Gluten protein      Current Facility-Administered Medications   Medication Frequency    0.9%  NaCl infusion (CRRT USE ONLY) Continuous    acetaminophen 1,000 mg/100 mL (10 mg/mL) injection 1,000 mg Q8H    amiodarone 360 mg/200 mL (1.8 mg/mL) infusion Continuous    bisacodyL suppository 10 mg Daily PRN    dextrose 10% bolus 125 mL 125 mL PRN    dextrose 10% bolus 250 mL 250 mL PRN    glucagon (human recombinant) injection 1 mg PRN    glucose chewable tablet 16 g PRN    glucose chewable tablet 24 g PRN    insulin aspart U-100 pen 0-5 Units Q4H PRN    lactated ringers infusion Continuous    LIDOcaine 5 % patch 1 patch Q24H    magnesium sulfate 2g in water 50mL IVPB (premix) PRN    metoprolol injection 5 mg Q6H    mupirocin 2 % ointment BID    ondansetron injection 8 mg Q8H PRN    pantoprazole injection 40 mg Daily    phytonadione vitamin k (AQUA-MEPHYTON) 10 mg in dextrose 5 % (D5W) 50 mL IVPB Once    sodium phosphate 20.01 mmol in dextrose 5 % (D5W) 250 mL IVPB PRN    sodium phosphate 30 mmol in dextrose 5 % (D5W) 250 mL IVPB PRN    sodium phosphate 39.99 mmol in dextrose 5 % (D5W) 250 mL IVPB PRN       Objective:     Vital Signs (Most Recent):  Temp: 97.7 °F (36.5 °C) (01/09/24 0715)  Pulse: 81 (01/09/24 0715)  Resp: (!) 24 (01/09/24 0715)  BP: 129/62 (01/09/24 0700)  SpO2: (!) 91 % (01/09/24 0715) Vital Signs (24h Range):  Temp:  [97.4 °F (36.3 °C)-97.8 °F (36.6 °C)] 97.7 °F (36.5 °C)  Pulse:  [] 81  Resp:  [9-71] 24  SpO2:  [87 %-98 %] 91 %  BP: ()/(41-62) 129/62  Arterial Line BP: ()/(39-67) 163/59     Weight: 68.5 kg (151 lb 0.2 oz) (01/07/24 0600)  Body mass index is 25.13 kg/m².  Body surface area is  1.77 meters squared.    I/O last 3 completed shifts:  In: 5330.8 [I.V.:2839.7; IV Piggyback:2491.1]  Out: 6186 [Drains:4650; Other:1506; Chest Tube:30]     Physical Exam  Vitals and nursing note reviewed.   Constitutional:       Appearance: Normal appearance.   HENT:      Head: Normocephalic and atraumatic.      Nose:      Comments: NGT with dark feculent output, slightly thinner than yesterday   Cardiovascular:      Rate and Rhythm: Normal rate and regular rhythm.      Comments: Right radial a line   Pulmonary:      Effort: Pulmonary effort is normal. No respiratory distress.      Comments: Right posterior chest incision c/d/I    Abdominal:      General: Abdomen is flat. There is no distension.      Palpations: Abdomen is soft. There is no mass.      Tenderness: There is no abdominal tenderness.      Hernia: No hernia is present.      Comments: Soft, nondistended  Nontender    Musculoskeletal:      Right lower leg: No edema.      Left lower leg: No edema.   Skin:     General: Skin is warm and dry.   Neurological:      General: No focal deficit present.      Mental Status: She is alert and oriented to person, place, and time.   Psychiatric:         Mood and Affect: Mood normal.         Behavior: Behavior normal.          Significant Labs:  CBC:   Recent Labs   Lab 01/09/24  0351   WBC 6.93  6.93   RBC 2.96*  2.96*   HGB 8.4*  8.4*   HCT 24.3*  24.3*     221   MCV 82  82   MCH 28.4  28.4   MCHC 34.6  34.6     CMP:   Recent Labs   Lab 01/09/24  0351 01/09/24  0802   * 146*   CALCIUM 8.6* 8.4*   ALBUMIN 2.5* 2.4*   PROT 6.1  --     142   K 4.6 4.9  4.9   CO2 23 22*    107   BUN 15 20   CREATININE 2.2* 2.5*   ALKPHOS 383*  --    ALT 37  --    *  --    BILITOT 1.6*  --      All labs within the past 24 hours have been reviewed.

## 2024-01-09 NOTE — ASSESSMENT & PLAN NOTE
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 On HD ~ 1 year. Last HD prior to presentation 1/1/24. Pt has NELY AVF placed on 10/23 that has not been cleared by vascular. Currently using RIJ TDC.     Nephrology History  iHD Schedule: TTS   Unit/MD: Timbo Hinds/ Dr. Vyas  Duration: 3.5 hours   EDW: 58 kg   Access: RIJ TDC  Residual Renal Function: minimal     Assessment:     -No further plans for RRT today   -Agree with volume resucitation   -Discontinue torsemide   -Discontinue sevelamer while on CRRT  -Epo   - Renal diet, if not NPO   - Continue to monitor intake and output, daily weights   - Avoid nephrotoxic medication and renal dose medications to GFR

## 2024-01-09 NOTE — PLAN OF CARE
SICU PLAN OF CARE NOTE    Dx: Malignant carcinoid tumor of bronchus    Goals of Care: MAP >55    Vital Signs (last 12 hours):   Temp:  [97.6 °F (36.4 °C)-98.2 °F (36.8 °C)]   Pulse:  [67-81]   Resp:  [11-39]   BP: (117-147)/(43-65)   SpO2:  [91 %-100 %]   Arterial Line BP: (109-163)/(32-59)      Neuro: AAO x4    Cardiac: NSR    Respiratory: Nasal Cannula 3 L    Gtts: MIVF, AMIO    Urine Output: Anuric 0 cc/shift    Dialysis:, Nephrology did not want Dialysis today    Drains:     Diet: NPO                 Labs/Accuchecks q8 renal. Q4 accuchecks    Skin:  All skin remains free from injury.  Patient turned Q2,immerse mattress inflated, and bed working correctly.    Shift Events:  Pt had a large bowel movement. No other significant events. .  See flowsheet for further assessment/details.  Family updated on current condition/plan of care, questions answered, and emotional support provided.  MD updated on current condition, vitals, labs, and gtts.  No new orders received, will continue to monitor.

## 2024-01-09 NOTE — PT/OT/SLP RE-EVAL
"Occupational Therapy   Re-evaluation    Name: Lily Green  MRN: [id number]  Admitting Diagnosis:  Malignant carcinoid tumor of bronchus  Recent Surgery: Procedure(s) (LRB):  LYMPHADENECTOMY (Right)  THORACOTOMY (Right)  THORACOTOMY, WITH INITIAL THERAPEUTIC WEDGE RESECTION OF LUNG (Right) 7 Days Post-Op  Pt t/f to ICU with tachycardia and syncope. New ordes received upon t/f to ICU     Recommendations:     Discharge Recommendations: Low Intensity Therapy    Assessment:     Lily Green is a 73 y.o. female with a medical diagnosis of Malignant carcinoid tumor of bronchus.   Performance deficits affecting function are weakness, impaired endurance, impaired self care skills, gait instability, impaired functional mobility, impaired balance.  Pt tolerated session well with good effort and performance.     Rehab Prognosis:  Good ; patient would benefit from acute skilled OT services to address these deficits and reach maximum level of function.       Plan:     Patient to be seen 3 x/week to address the above listed problems via self-care/home management, therapeutic activities, therapeutic exercises  Plan of Care Expires: 02/02/24  Plan of Care Reviewed with: patient    Subjective   Pt agreeable to therapy.   "I have mucous" pt states several times during session.     Communicated with: sharla prior to session.  Pain/Comfort:  Pain Rating 1: 0/10    Objective:     Communicated with: nsg prior to session.  Patient found in bed with tele, pulse ox, BP cuff   Cargiver in room.   Coreassessment completed this date to optimize functional performance and safety given impaired tolerance for activity in setting of ICU   General Precautions: Standard, fall    Occupational Performance:    Bed Mobility:    Supine>sit with MIN A     Functional Mobility/Transfers:  Sit>stand with CGA   Few steps to chair with CGA/HHA     Activities of Daily Living:  G/H; sitting with set-up  LE dressing SBA to manage footwear seated " EOB.   Toileting with MAX both supine and in standing     Cognitive/Visual Perceptual  Pt awake, alert and following commands.     Physical Exam:  Pt is right hand dominant and demo WFL UE strength/ROM. Pt noted she had 2 prior surgeries to R RTC; therefore, decreased ROM but remains functionally.     [facility] 6 Click:  [facility] Total Score: 18    Treatment & Education:  Pt tolerated sitting EOB approx 10 min with SBA while engaged with social interaction and basic ADL skills. Pt able to stand and take few steps to chair with CGA/HHA.   Education  provided re: role of OT and safety with functional mobility/ADL skills.     Patient left up in chair with all lines intact, call button in reach, and nsg notified    GOALS:   Multidisciplinary Problems       Occupational Therapy Goals          Problem: Occupational Therapy    Goal Priority Disciplines Outcome Interventions   Occupational Therapy Goal     OT, PT/OT Ongoing, Progressing    Description: Goals to be met by: 24     Patient will increase functional independence with ADLs by performing:    UE Dressing with Supervision.  Grooming while standing at sink with Modified [place].  Toileting from toilet with Modified [place] for hygiene and clothing management.   Supine to sit with Supervision.  Toilet transfer to toilet with Supervision.                         History:     Past Medical History:   Diagnosis Date    Anxiety     Celiac disease 2018    Celiac disease     Depression     Diabetes mellitus     Family history of breast cancer in mother      at age 68    Hyperlipidemia     Hypertension     Meniere disease     Meniere's disease, unspecified ear     Menopause     Peptic ulcer     Reflux esophagitis     Urinary tract infection     Vaginal infection     Vaginal Pap smear 2014    Pap/Hpv Negative          Past Surgical History:   Procedure Laterality Date    APPENDECTOMY      BREAST SURGERY      Tags    CARPAL TUNNEL RELEASE Bilateral  09/2017    ,     CLOSURE, COLOSTOMY Left 1/16/2023    Procedure: CLOSURE, COLOSTOMY;  Surgeon: Manuel Javier MD;  Location: [facility] OR;  Service: General;  Laterality: Left;    COLONOSCOPY  04/2018    Normal  ( Juan A)     COLOSTOMY Right 1/16/2023    Procedure: CREATION, COLOSTOMY;  Surgeon: Manuel Javier MD;  Location: [facility] OR;  Service: General;  Laterality: Right;    DILATION AND CURETTAGE OF UTERUS  1986    DUPUYTREN CONTRACTURE RELEASE Bilateral 09/2017    HYSTERECTOMY  1987    BEAU w/ appy, no BSO     INJECTION OF NEUROLYTIC AGENT AROUND LUMBAR SYMPATHETIC NERVE N/A 1/6/2022    Procedure: BLOCK, LUMBAR SYMPATHETIC;  Surgeon: Souleymane Rizo Jr., MD;  Location: [facility] PAIN MGT;  Service: Pain Management;  Laterality: N/A;  no pacemaker   pt is diabetic     INJECTION OF NEUROLYTIC AGENT AROUND LUMBAR SYMPATHETIC NERVE N/A 8/25/2022    Procedure: BLOCK, LUMBAR SYMPATHETIC;  Surgeon: Souleymane Rizo Jr., MD;  Location: [facility] PAIN MGT;  Service: Pain Management;  Laterality: N/A;  diabetic     INSERTION OF TUNNELED CENTRAL VENOUS HEMODIALYSIS CATHETER Right 1/25/2023    Procedure: INSERTION, CATHETER, HEMODIALYSIS, DUAL LUMEN;  Surgeon: Manuel Javier MD;  Location: [facility];  Service: General;  Laterality: Right;    INSERTION, CATHETER, TUNNELED Left 1/28/2023    Procedure: Insertion,catheter,tunneled;  Surgeon: Watson Fontenot MD;  Location: [facility] OR;  Service: General;  Laterality: Left;    LAPAROTOMY, EXPLORATORY N/A 1/14/2023    Procedure: LAPAROTOMY, EXPLORATORY;  Surgeon: Manuel Javier MD;  Location: [facility] OR;  Service: General;  Laterality: N/A;    LAPAROTOMY, EXPLORATORY N/A 1/16/2023    Procedure: LAPAROTOMY, EXPLORATORY;  Surgeon: Manuel Javier MD;  Location: [facility];  Service: General;  Laterality: N/A;    LYMPHADENECTOMY Right 1/2/2024    Procedure: LYMPHADENECTOMY;  Surgeon: Tan Thomson MD;  Location: [address];  Service: Cardiothoracic;  Laterality: Right;     REMOVAL OF CATHETER Right 1/28/2023    Procedure: REMOVAL, CATHETER;  Surgeon: Watson Fontenot MD;  Location: [facility] OR;  Service: General;  Laterality: Right;    REMOVAL, TUNNELED CATH Right 1/25/2023    Procedure: REMOVAL, TUNNELED CATH;  Surgeon: Manuel Javier MD;  Location: [facility] OR;  Service: General;  Laterality: Right;    ROBOTIC BRONCHOSCOPY N/A 10/20/2023    Procedure: ROBOTIC BRONCHOSCOPY;  Surgeon: Mayda Monzon MD;  [facility];  Service: Pulmonary;  Laterality: N/A;    SHOULDER SURGERY  2009 & 2010    right rotator cuff    THORACOTOMY Right 1/2/2024    Procedure: THORACOTOMY;  Surgeon: Tan Thomson MD;  [facility];  Service: Cardiothoracic;  Laterality: Right;    THORACOTOMY, WITH INITIAL THERAPEUTIC WEDGE RESECTION OF LUNG Right 1/2/2024    Procedure: THORACOTOMY, WITH INITIAL THERAPEUTIC WEDGE RESECTION OF LUNG;  Surgeon: Tan Thomson MD;  [facility];  Service: Cardiothoracic;  Laterality: Right;    TONSILLECTOMY      UPPER GASTROINTESTINAL ENDOSCOPY  04/2018       Time Tracking:     OT Date of Treatment: 01/09/24  OT Start Time: 0753  OT Stop Time: 0810  OT Total Time (min): 17 min    Billable Minutes:Re-eval 9  Self Care/Home Management 8    1/9/2024

## 2024-01-09 NOTE — ASSESSMENT & PLAN NOTE
Restart Amiodarone gtt @ 0.5 mg/min  If hypotensive despite Amiodarone gtt, can give another Digoxin 0.125 mcg IV x1; can repeat Digoxin level at the end of the week  Continue Eliquis for anticoagulation, but can switch to IV Heparin gtt if unable to take PO consistently with ileus  If becomes hemodynamically unstable, would emergently DCCV  Otherwise can electively DCCV once ileus resolved, chest tube removed, etc to limit risk of Afib recurrence  Update TTE once rate better controlled (sustained HR < 110 bpm)  Monitor on telemetry; K>4, Mg>2    Once patient able to tolerate PO:  - recommend starting lopressor 25mg BID  - po amiodarone loading dose 400mg BID for 2 weeks, then 200mg daily  - eliquis 5mg BID

## 2024-01-09 NOTE — ASSESSMENT & PLAN NOTE
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 Stepped up to the ICU 1/5 with afib RVR refractory to IV metoprolol, started on amio gtt with conversion.     - CT removed 1/8  - Ngt placed 1/8 with alrge volume output; CT abd/pelvis 1/8 with SBO. Continue to monitor output today. As output slows, if she continues to have Bms and flatus, would proceed with GGC later today vs tomorrow   - US 1/8 with no biliary abnormality   - Cards consult for refractory afib RVR, s/p chemical cardioversion. Return of afib RVR 1/7-1/8, restarted on amio gtt, heparin gtt.   Appreciate cards assistance.   Heparin gtt while NPO; convert to PO following ROBF   Amio gtt w/ metop IV  Echo 1/6 with normal function  - NPO  - Nephrology following, appreciate assistance. SLED 1/8 overnight. Continue SLED   - Q8H RFP while NPO; daily CMP, CBC   - Appreciate SICU assistance

## 2024-01-09 NOTE — PLAN OF CARE
Problem: Physical Therapy  Goal: Physical Therapy Goal  Description: PT goals until 2/924    1. Pt supine to sit with supervision-not met  2. Pt sit to supine with supervision-not met  3. Pt sit to stand with AD if needed with supervision-not met  4. Pt to perform gait 150 ft with AD if needed with supervision.-not met  5. Pt to go up/down curb step with AD if needed with CGA.-not met    Outcome: Ongoing, Progressing   Re-eval completed and goals updated for time frame and content. Goals are appropriate. 1/9/2024

## 2024-01-09 NOTE — PLAN OF CARE
Problem: Occupational Therapy  Goal: Occupational Therapy Goal  Description: Goals to be met by: 01/23/24     Patient will increase functional independence with ADLs by performing:    UE Dressing with Supervision.  Grooming while standing at sink with Modified [place].  Toileting from toilet with Modified [place] for hygiene and clothing management.   Supine to sit with Supervision.  Toilet transfer to toilet with Supervision.    Outcome: Ongoing, Progressing

## 2024-01-09 NOTE — PROGRESS NOTES
[facility]  Nephrology  Progress Note    Patient Name: Lily Green  MRN: [id number]  Admission Date: 1/2/2024  Hospital Length of Stay: 7 days  Attending Provider: Tan Thomson MD   Primary Care Physician: Pavel Calixto MD  Principal Problem:Malignant carcinoid tumor of bronchus    Subjective:       Interval History: 10 hour SLED overnight for clearance. NGT placed yesterday with 4.6L output. Received 1 unit PRBC and 1L NS. LR @ 50ml/hour. Net -825. Continues on amio gtt, rate controlled.     Review of patient's allergies indicates:   Allergen Reactions    Crestor [rosuvastatin] Swelling    Gluten protein      Current Facility-Administered Medications   Medication Frequency    0.9%  NaCl infusion (CRRT USE ONLY) Continuous    acetaminophen 1,000 mg/100 mL (10 mg/mL) injection 1,000 mg Q8H    amiodarone 360 mg/200 mL (1.8 mg/mL) infusion Continuous    bisacodyL suppository 10 mg Daily PRN    dextrose 10% bolus 125 mL 125 mL PRN    dextrose 10% bolus 250 mL 250 mL PRN    glucagon (human recombinant) injection 1 mg PRN    glucose chewable tablet 16 g PRN    glucose chewable tablet 24 g PRN    insulin aspart U-100 pen 0-5 Units Q4H PRN    lactated ringers infusion Continuous    LIDOcaine 5 % patch 1 patch Q24H    magnesium sulfate 2g in water 50mL IVPB (premix) PRN    metoprolol injection 5 mg Q6H    mupirocin 2 % ointment BID    ondansetron injection 8 mg Q8H PRN    pantoprazole injection 40 mg Daily    phytonadione vitamin k (AQUA-MEPHYTON) 10 mg in dextrose 5 % (D5W) 50 mL IVPB Once    sodium phosphate 20.01 mmol in dextrose 5 % (D5W) 250 mL IVPB PRN    sodium phosphate 30 mmol in dextrose 5 % (D5W) 250 mL IVPB PRN    sodium phosphate 39.99 mmol in dextrose 5 % (D5W) 250 mL IVPB PRN       Objective:     Vital Signs (Most Recent):  Temp: 97.7 °F (36.5 °C) (01/09/24 0715)  Pulse: 81 (01/09/24 0715)  Resp: (!) 24 (01/09/24 0715)  BP: 129/62 (01/09/24 0700)  SpO2: (!) 91 %  (01/09/24 0715) Vital Signs (24h Range):  Temp:  [97.4 °F (36.3 °C)-97.8 °F (36.6 °C)] 97.7 °F (36.5 °C)  Pulse:  [] 81  Resp:  [9-71] 24  SpO2:  [87 %-98 %] 91 %  BP: ()/(41-62) 129/62  Arterial Line BP: ()/(39-67) 163/59     Weight: 68.5 kg (151 lb 0.2 oz) (01/07/24 0600)  Body mass index is 25.13 kg/m².  Body surface area is 1.77 meters squared.    I/O last 3 completed shifts:  In: 5330.8 [I.V.:2839.7; IV Piggyback:2491.1]  Out: 6186 [Drains:4650; Other:1506; Chest Tube:30]     Physical Exam  Vitals and nursing note reviewed.   Constitutional:       Appearance: Normal appearance.   HENT:      Head: Normocephalic and atraumatic.      Nose:      Comments: NGT with dark feculent output, slightly thinner than yesterday   Cardiovascular:      Rate and Rhythm: Normal rate and regular rhythm.      Comments: Right radial a line   Pulmonary:      Effort: Pulmonary effort is normal. No respiratory distress.      Comments: Right posterior chest incision c/d/I    Abdominal:      General: Abdomen is flat. There is no distension.      Palpations: Abdomen is soft. There is no mass.      Tenderness: There is no abdominal tenderness.      Hernia: No hernia is present.      Comments: Soft, nondistended  Nontender    Musculoskeletal:      Right lower leg: No edema.      Left lower leg: No edema.   Skin:     General: Skin is warm and dry.   Neurological:      General: No focal deficit present.      Mental Status: She is alert and oriented to person, place, and time.   Psychiatric:         Mood and Affect: Mood normal.         Behavior: Behavior normal.          Significant Labs:  CBC:   Recent Labs   Lab 01/09/24  0351   WBC 6.93  6.93   RBC 2.96*  2.96*   HGB 8.4*  8.4*   HCT 24.3*  24.3*     221   MCV 82  82   MCH 28.4  28.4   MCHC 34.6  34.6     CMP:   Recent Labs   Lab 01/09/24  0351 01/09/24  0802   * 146*   CALCIUM 8.6* 8.4*   ALBUMIN 2.5* 2.4*   PROT 6.1  --     142   K 4.6 4.9   4.9   CO2 23 22*    107   BUN 15 20   CREATININE 2.2* 2.5*   ALKPHOS 383*  --    ALT 37  --    *  --    BILITOT 1.6*  --      All labs within the past 24 hours have been reviewed.       Assessment/Plan:     Cardiac/Vascular  Atrial fibrillation with rapid ventricular response  -management per primary     Renal/  ESRD (end stage renal disease)  S/p R thoracotomy due to RML carcinoid tumor. On HD ~ 1 year. Last HD prior to presentation 1/1/24. Pt has NELY AVF placed on 10/23 that has not been cleared by vascular. Currently using RIJ TDC.     Nephrology History  iHD Schedule: TTS   Unit/MD: Timbo Hinds/ Dr. Vyas  Duration: 3.5 hours   EDW: 58 kg   Access: RIJ TDC  Residual Renal Function: minimal     Assessment:     -No further plans for RRT today   -Agree with volume resucitation   -Discontinue torsemide   -Discontinue sevelamer while on CRRT  -Epo   - Renal diet, if not NPO   - Continue to monitor intake and output, daily weights   - Avoid nephrotoxic medication and renal dose medications to GFR    Oncology  * Malignant carcinoid tumor of bronchus  -management per primary         Thank you for your consult. I will follow-up with patient. Please contact us if you have any additional questions.    Christy Gaytan DNP  Nephrology  [facility]

## 2024-01-09 NOTE — SUBJECTIVE & OBJECTIVE
Interval History: Converted back to NSR overnight. HR in 70s. NG tube in place for ileus/sbo. Received SLED overnight. /62 this morning. No chest pain or SOB.     Review of Systems   Constitutional: Positive for malaise/fatigue. Negative for chills and fever.   HENT:  Negative for sore throat.    Eyes:  Negative for visual disturbance.   Cardiovascular:  Negative for chest pain, irregular heartbeat, leg swelling and palpitations.   Respiratory:  Negative for cough and shortness of breath.    Gastrointestinal:  Negative for abdominal pain.   All other systems reviewed and are negative.    Objective:     Vital Signs (Most Recent):  Temp: 97.7 °F (36.5 °C) (01/09/24 0715)  Pulse: 81 (01/09/24 0715)  Resp: (!) 24 (01/09/24 0715)  BP: (!) 126/43 (01/09/24 1128)  SpO2: (!) 91 % (01/09/24 0715) Vital Signs (24h Range):  Temp:  [97.4 °F (36.3 °C)-97.8 °F (36.6 °C)] 97.7 °F (36.5 °C)  Pulse:  [] 81  Resp:  [9-71] 24  SpO2:  [88 %-98 %] 91 %  BP: ()/(41-62) 126/43  Arterial Line BP: ()/(39-67) 163/59     Weight: 68.5 kg (151 lb 0.2 oz)  Body mass index is 25.13 kg/m².     SpO2: (!) 91 %         Intake/Output Summary (Last 24 hours) at 1/9/2024 1134  Last data filed at 1/9/2024 1000  Gross per 24 hour   Intake 5491.26 ml   Output 6156 ml   Net -664.74 ml         Lines/Drains/Airways       Central Venous Catheter Line  Duration                  Hemodialysis Catheter 01/25/23 1501 right internal jugular 348 days              Drain  Duration                  NG/OG Tube 01/08/24 1303 nasogastric Left nostril <1 day              Arterial Line  Duration             Arterial Line 01/08/24 1324 Right Radial <1 day              Peripheral Intravenous Line  Duration                  Hemodialysis AV Fistula 09/01/23 0800 Left forearm 130 days         Peripheral IV - Single Lumen 01/08/24 1108 22 G Posterior;Right Wrist 1 day         Peripheral IV - Single Lumen 01/08/24 1357 20 G Posterior;Right Forearm <1 day          Peripheral IV - Single Lumen 01/09/24 0542 20 G Anterior;Right Forearm <1 day                       Physical Exam  Constitutional:       General: She is not in acute distress.     Appearance: She is not ill-appearing.   HENT:      Head: Normocephalic.      Nose:      Comments: Ng tube draining dark fluid     Mouth/Throat:      Mouth: Mucous membranes are dry.   Eyes:      Extraocular Movements: Extraocular movements intact.   Cardiovascular:      Rate and Rhythm: Normal rate and regular rhythm.      Pulses: Normal pulses.      Comments: R IJ catheter  R chest incision c/d/i  Pulmonary:      Effort: Pulmonary effort is normal. No respiratory distress.   Abdominal:      General: There is distension (improving).      Palpations: Abdomen is soft.      Tenderness: There is no abdominal tenderness.   Musculoskeletal:      Cervical back: Neck supple.      Right lower leg: No edema.      Left lower leg: No edema.   Skin:     General: Skin is warm.   Neurological:      Mental Status: She is oriented to person, place, and time.          Significant Labs: BMP:   Recent Labs   Lab 01/08/24  1310 01/08/24  1646 01/08/24  2106 01/09/24  0044 01/09/24  0351 01/09/24  0802   *  245*   < > 119* 103 115* 146*   *  135*   < > 140 140 141 142   K 4.8  4.9   < > 4.5  4.5 4.5  4.5 4.6 4.9  4.9   CL 81*  83*   < > 101 104 104 107   CO2 26  26   < > 25 24 23 22*   BUN 75*  74*   < > 36* 19 15 20   CREATININE 7.2*  7.6*   < > 3.6* 2.1* 2.2* 2.5*   CALCIUM 8.6*  8.7   < > 8.4* 8.3* 8.6* 8.4*   MG 3.1*  --  2.4  --  2.2  --     < > = values in this interval not displayed.     , CMP   Recent Labs   Lab 01/08/24  1155 01/08/24  1310 01/08/24  1646 01/09/24  0044 01/09/24  0351 01/09/24  0802   * 134*  135*   < > 140 141 142   K 5.2*  5.2* 4.8  4.9   < > 4.5  4.5 4.6 4.9  4.9   CL 78* 81*  83*   < > 104 104 107   CO2 28 26  26   < > 24 23 22*   * 237*  245*   < > 103 115* 146*   BUN 72* 75*  74*   " < > 19 15 20   CREATININE 7.3* 7.2*  7.6*   < > 2.1* 2.2* 2.5*   CALCIUM 9.1 8.6*  8.7   < > 8.3* 8.6* 8.4*   PROT 6.8 5.9*  --   --  6.1  --    ALBUMIN 2.4* 2.1*  2.1*   < > 2.5* 2.5* 2.4*   BILITOT 3.4* 3.1*  --   --  1.6*  --    ALKPHOS 563* 514*  --   --  383*  --    * 154*  --   --  105*  --    ALT 50* 46*  --   --  37  --    ANIONGAP 27* 27*  26*   < > 12 14 13    < > = values in this interval not displayed.     , CBC   Recent Labs   Lab 01/08/24  1314 01/08/24  1646 01/09/24  0351   WBC 8.05 7.59 6.93  6.93   HGB 8.2* 7.0* 8.4*  8.4*   HCT 23.5* 20.0* 24.3*  24.3*    304 221  221     , INR   Recent Labs   Lab 01/08/24  1348 01/09/24  0802   INR 4.2* 5.1*   , Lipid Panel No results for input(s): "CHOL", "HDL", "LDLCALC", "TRIG", "CHOLHDL" in the last 48 hours., and Troponin   No results for input(s): "TROPONINI" in the last 48 hours.      Significant Imaging:   Reviewed  "

## 2024-01-09 NOTE — PHYSICIAN QUERY
PT Name: Lily Green  MR #: [id number]    DOCUMENTATION CLARIFICATION     CDS/: Tiffany Borjas RN               Contact information: [email or web address]    This form is a permanent document in the medical record.    Query Date: January 9, 2024    Dear Provider,  By submitting this query, we are merely seeking further clarification of documentation. Please utilize your independent clinical judgment when addressing the question(s) below.      Supporting Clinical Findings   Location in Medical Record   Post-op afib with RVR. Rates currently ~150s.  Asymptomatic. No HF signs/symptoms.  Atrial fibrillation with rapid ventricular response  Likely secondary to recent surgery and/or volume removal with HD  Hemodynamically stable  CHADSVASC 3   Start Amiodarone bolus + gtt  Start Lopressor 25 mg BID if BP will tolerate    Procedure Performed:    1. Flexible Bronchoscopy  2. Robotic-Assisted surgery converted to right thoracotomy with therapeutic wedge resection  3. Open Mediastinal Lymph Node Dissection  4. Intercostal Nerve Blocks.   Cards Consult 1/5                  Op Note 1/2       Please clarify the term postoperative as it relates to __afib with RVR____________.    [x  ] Condition occurred in the postoperative period (not a complication of surgery)     [   ] Condition is a complication of surgery     [   ] Condition due to anesthesia     [   ] Other intended meaning (please specify): ____________________________

## 2024-01-09 NOTE — PROGRESS NOTES
[facility]  Cardiology  Progress Note    Patient Name: Lily Green  MRN: [id number]  Admission Date: 1/2/2024  Hospital Length of Stay: 7 days  Code Status: Full Code   Attending Physician: Tan Thomson MD   Primary Care Physician: Pavel Calixto MD  Expected Discharge Date: 1/11/2024  Principal Problem:Malignant carcinoid tumor of bronchus    Subjective:     Hospital Course:   No notes on file    Interval History: Converted back to NSR overnight. HR in 70s. NG tube in place for ileus/sbo. Received SLED overnight. /62 this morning. No chest pain or SOB.     Review of Systems   Constitutional: Positive for malaise/fatigue. Negative for chills and fever.   HENT:  Negative for sore throat.    Eyes:  Negative for visual disturbance.   Cardiovascular:  Negative for chest pain, irregular heartbeat, leg swelling and palpitations.   Respiratory:  Negative for cough and shortness of breath.    Gastrointestinal:  Negative for abdominal pain.   All other systems reviewed and are negative.    Objective:     Vital Signs (Most Recent):  Temp: 97.7 °F (36.5 °C) (01/09/24 0715)  Pulse: 81 (01/09/24 0715)  Resp: (!) 24 (01/09/24 0715)  BP: (!) 126/43 (01/09/24 1128)  SpO2: (!) 91 % (01/09/24 0715) Vital Signs (24h Range):  Temp:  [97.4 °F (36.3 °C)-97.8 °F (36.6 °C)] 97.7 °F (36.5 °C)  Pulse:  [] 81  Resp:  [9-71] 24  SpO2:  [88 %-98 %] 91 %  BP: ()/(41-62) 126/43  Arterial Line BP: ()/(39-67) 163/59     Weight: 68.5 kg (151 lb 0.2 oz)  Body mass index is 25.13 kg/m².     SpO2: (!) 91 %         Intake/Output Summary (Last 24 hours) at 1/9/2024 1134  Last data filed at 1/9/2024 1000  Gross per 24 hour   Intake 5491.26 ml   Output 6156 ml   Net -664.74 ml         Lines/Drains/Airways       Central Venous Catheter Line  Duration                  Hemodialysis Catheter 01/25/23 1501 right internal jugular 348 days              Drain  Duration                  NG/OG Tube  01/08/24 1303 nasogastric Left nostril <1 day              Arterial Line  Duration             Arterial Line 01/08/24 1324 Right Radial <1 day              Peripheral Intravenous Line  Duration                  Hemodialysis AV Fistula 09/01/23 0800 Left forearm 130 days         Peripheral IV - Single Lumen 01/08/24 1108 22 G Posterior;Right Wrist 1 day         Peripheral IV - Single Lumen 01/08/24 1357 20 G Posterior;Right Forearm <1 day         Peripheral IV - Single Lumen 01/09/24 0542 20 G Anterior;Right Forearm <1 day                       Physical Exam  Constitutional:       General: She is not in acute distress.     Appearance: She is not ill-appearing.   HENT:      Head: Normocephalic.      Nose:      Comments: Ng tube draining dark fluid     Mouth/Throat:      Mouth: Mucous membranes are dry.   Eyes:      Extraocular Movements: Extraocular movements intact.   Cardiovascular:      Rate and Rhythm: Normal rate and regular rhythm.      Pulses: Normal pulses.      Comments: R IJ catheter  R chest incision c/d/i  Pulmonary:      Effort: Pulmonary effort is normal. No respiratory distress.   Abdominal:      General: There is distension (improving).      Palpations: Abdomen is soft.      Tenderness: There is no abdominal tenderness.   Musculoskeletal:      Cervical back: Neck supple.      Right lower leg: No edema.      Left lower leg: No edema.   Skin:     General: Skin is warm.   Neurological:      Mental Status: She is oriented to person, place, and time.          Significant Labs: BMP:   Recent Labs   Lab 01/08/24  1310 01/08/24  1646 01/08/24  2106 01/09/24  0044 01/09/24  0351 01/09/24  0802   *  245*   < > 119* 103 115* 146*   *  135*   < > 140 140 141 142   K 4.8  4.9   < > 4.5  4.5 4.5  4.5 4.6 4.9  4.9   CL 81*  83*   < > 101 104 104 107   CO2 26  26   < > 25 24 23 22*   BUN 75*  74*   < > 36* 19 15 20   CREATININE 7.2*  7.6*   < > 3.6* 2.1* 2.2* 2.5*   CALCIUM 8.6*  8.7   < > 8.4*  "8.3* 8.6* 8.4*   MG 3.1*  --  2.4  --  2.2  --     < > = values in this interval not displayed.     , CMP   Recent Labs   Lab 01/08/24  1155 01/08/24  1310 01/08/24  1646 01/09/24  0044 01/09/24  0351 01/09/24  0802   * 134*  135*   < > 140 141 142   K 5.2*  5.2* 4.8  4.9   < > 4.5  4.5 4.6 4.9  4.9   CL 78* 81*  83*   < > 104 104 107   CO2 28 26  26   < > 24 23 22*   * 237*  245*   < > 103 115* 146*   BUN 72* 75*  74*   < > 19 15 20   CREATININE 7.3* 7.2*  7.6*   < > 2.1* 2.2* 2.5*   CALCIUM 9.1 8.6*  8.7   < > 8.3* 8.6* 8.4*   PROT 6.8 5.9*  --   --  6.1  --    ALBUMIN 2.4* 2.1*  2.1*   < > 2.5* 2.5* 2.4*   BILITOT 3.4* 3.1*  --   --  1.6*  --    ALKPHOS 563* 514*  --   --  383*  --    * 154*  --   --  105*  --    ALT 50* 46*  --   --  37  --    ANIONGAP 27* 27*  26*   < > 12 14 13    < > = values in this interval not displayed.     , CBC   Recent Labs   Lab 01/08/24  1314 01/08/24  1646 01/09/24  0351   WBC 8.05 7.59 6.93  6.93   HGB 8.2* 7.0* 8.4*  8.4*   HCT 23.5* 20.0* 24.3*  24.3*    304 221  221     , INR   Recent Labs   Lab 01/08/24  1348 01/09/24  0802   INR 4.2* 5.1*   , Lipid Panel No results for input(s): "CHOL", "HDL", "LDLCALC", "TRIG", "CHOLHDL" in the last 48 hours., and Troponin   No results for input(s): "TROPONINI" in the last 48 hours.      Significant Imaging:   Reviewed  Assessment and Plan:       Atrial fibrillation with rapid ventricular response  Restart Amiodarone gtt @ 0.5 mg/min  If hypotensive despite Amiodarone gtt, can give another Digoxin 0.125 mcg IV x1; can repeat Digoxin level at the end of the week  Continue Eliquis for anticoagulation, but can switch to IV Heparin gtt if unable to take PO consistently with ileus  If becomes hemodynamically unstable, would emergently DCCV  Otherwise can electively DCCV once ileus resolved, chest tube removed, etc to limit risk of Afib recurrence  Update TTE once rate better controlled (sustained HR < " 110 bpm)  Monitor on telemetry; K>4, Mg>2    Once patient able to tolerate PO:  - recommend starting lopressor 25mg BID  - po amiodarone loading dose 400mg BID for 2 weeks, then 200mg daily  - eliquis 5mg BID        VTE Risk Mitigation (From admission, onward)           Ordered     heparin (porcine) injection 1,000 Units  As needed (PRN)         01/03/24 1123     IP VTE HIGH RISK PATIENT  Once         01/02/24 1226     Place CARMELITA hose  Until discontinued         01/02/24 1226     Place sequential compression device  Until discontinued         01/02/24 1226                    Yuki Brower MD  Cardiology  [facility]

## 2024-01-09 NOTE — SUBJECTIVE & OBJECTIVE
Interval History:   Placed back on amio gtt yesterday, heparin gtt   NGT placed yesterday with large volume gastric decompressino - close to 5 L out since   Received ~ 3 L resuscitation eysterday, 1 uPRBC and 1 L NS overnight.   CT abd/pelvis with possible SBO.  SLED overnight  This am looking and feeling much better. Has had some Bms. In sinus with rate control. Pressures much improved. Labs looking much better.       Medications:  Continuous Infusions:   sodium chloride 0.9% 200 mL/hr at 01/09/24 0300    amiodarone in dextrose 5% 0.5 mg/min (01/09/24 0800)    heparin (porcine) in D5W 12 Units/kg/hr (01/09/24 0800)    phenylephrine Stopped (01/08/24 2137)       Scheduled Meds:   acetaminophen  1,000 mg Intravenous Q8H    heparin (PORCINE)  60 Units/kg (Adjusted) Intravenous Once    LIDOcaine  1 patch Transdermal Q24H    metoprolol  5 mg Intravenous Q6H    mupirocin   Nasal BID    pantoprazole  40 mg Intravenous Daily    sodium chloride 0.9%  1,000 mL Intravenous Once     PRN Meds:0.9%  NaCl infusion (for blood administration), bisacodyL, dextrose 10%, dextrose 10%, glucagon (human recombinant), glucose, glucose, heparin (PORCINE), heparin (PORCINE), HYDROmorphone, insulin aspart U-100, magnesium sulfate IVPB, ondansetron, sodium phosphate 20.01 mmol in dextrose 5 % (D5W) 250 mL IVPB, sodium phosphate 30 mmol in dextrose 5 % (D5W) 250 mL IVPB, sodium phosphate 39.99 mmol in dextrose 5 % (D5W) 250 mL IVPB     Review of patient's allergies indicates:   Allergen Reactions    Crestor [rosuvastatin] Swelling    Gluten protein      Objective:     Vital Signs (Most Recent):  Temp: 97.7 °F (36.5 °C) (01/09/24 0715)  Pulse: 81 (01/09/24 0715)  Resp: (!) 24 (01/09/24 0715)  BP: 129/62 (01/09/24 0700)  SpO2: (!) 91 % (01/09/24 0715) Vital Signs (24h Range):  Temp:  [97.4 °F (36.3 °C)-97.8 °F (36.6 °C)] 97.7 °F (36.5 °C)  Pulse:  [] 81  Resp:  [9-71] 24  SpO2:  [87 %-98 %] 91 %  BP: ()/(41-62) 129/62  Arterial Line  BP: ()/(39-67) 163/59     Intake/Output - Last 3 Shifts         01/07 0700  01/08 0659 01/08 0700 01/09 0659 01/09 0700  01/10 0659    P.O. 490      I.V. (mL/kg)  2791.6 (40.8) 72.2 (1.1)    IV Piggyback 520.1 2491.1     Total Intake(mL/kg) 1010.1 (14.7) 5282.7 (77.1) 72.2 (1.1)    Drains  4650     Other  1506     Stool  0     Chest Tube 50      Total Output 50 6156     Net +960.1 -873.3 +72.2           Stool Occurrence  1 x             SpO2: (!) 91 %        Physical Exam  Vitals and nursing note reviewed.   Constitutional:       Appearance: Normal appearance.   HENT:      Head: Normocephalic and atraumatic.      Nose:      Comments: NGT with dark feculent output, slightly thinner than yesterday   Cardiovascular:      Rate and Rhythm: Normal rate and regular rhythm.      Comments: Right radial a line   Pulmonary:      Effort: Pulmonary effort is normal. No respiratory distress.      Comments: Right posterior chest incision c/d/I    Abdominal:      General: Abdomen is flat. There is no distension.      Palpations: Abdomen is soft. There is no mass.      Tenderness: There is no abdominal tenderness.      Hernia: No hernia is present.      Comments: Soft, nondistended  Nontender    Musculoskeletal:      Right lower leg: No edema.      Left lower leg: No edema.   Skin:     General: Skin is warm and dry.   Neurological:      General: No focal deficit present.      Mental Status: She is alert and oriented to person, place, and time.   Psychiatric:         Mood and Affect: Mood normal.         Behavior: Behavior normal.            Significant Labs:  BMP:   Recent Labs   Lab 01/09/24  0351 01/09/24  0802   * 146*    142   K 4.6 4.9  4.9    107   CO2 23 22*   BUN 15 20   CREATININE 2.2* 2.5*   CALCIUM 8.6* 8.4*   MG 2.2  --        CBC:   Recent Labs   Lab 01/09/24  0351   WBC 6.93  6.93   RBC 2.96*  2.96*   HGB 8.4*  8.4*   HCT 24.3*  24.3*     221   MCV 82  82   MCH 28.4  28.4   MCHC  34.6  34.6         Significant Diagnostics:  CT: I have reviewed all pertinent results/findings within the past 24 hours    VTE Risk Mitigation (From admission, onward)           Ordered     heparin 25,000 units in dextrose 5% (100 units/ml) IV bolus from bag - ADDITIONAL PRN BOLUS - 30 units/kg  As needed (PRN)        Question:  Heparin Infusion Adjustment (DO NOT MODIFY ANSWER)  Answer:  [email or web address]\epic\Images\Pharmacy\HeparinInfusions\heparin LOW INTENSITY nomogram for [facility] [id number]    01/08/24 1318     heparin 25,000 units in dextrose 5% (100 units/ml) IV bolus from bag - ADDITIONAL PRN BOLUS - 60 units/kg  As needed (PRN)        Question:  Heparin Infusion Adjustment (DO NOT MODIFY ANSWER)  Answer:  [email or web address]\epic\Images\Pharmacy\HeparinInfusions\heparin LOW INTENSITY nomogram for [facility] [id number].pdf    01/08/24 1318     heparin 25,000 units in dextrose 5% (100 units/ml) IV bolus from bag INITIAL BOLUS  Once        Question:  Heparin Infusion Adjustment (DO NOT MODIFY ANSWER)  Answer:  [email or web address]\epic\Images\Pharmacy\HeparinInfusions\heparin LOW INTENSITY nomogram for [facility] [id number].pdf    01/08/24 1318     heparin 25,000 units in dextrose 5% 250 mL (100 units/mL) infusion LOW INTENSITY nomogram - [facility]  Continuous        Question:  Begin at (units/kg/hr)  Answer:  12    01/08/24 1318     heparin (porcine) injection 1,000 Units  As needed (PRN)         01/03/24 1123     IP VTE HIGH RISK PATIENT  Once         01/02/24 1226     Place CARMELITA hose  Until discontinued         01/02/24 1226     Place sequential compression device  Until discontinued         01/02/24 1226

## 2024-01-09 NOTE — SUBJECTIVE & OBJECTIVE
"Interval HPI:   Overnight events: No acute events overnight. Patient in room 69329 CVICU/58042 CVICU A. Blood glucose worsening. BG at and above goal on current insulin regimen (SSI ). Steroid use- None. 7 Days Post-Op  Renal function- Abnormal - Creatinine 2.2  Vasopressors-  None       Endocrine will continue to follow and manage insulin orders inpatient.         Diet NPO     Eating:   NPO  Nausea: No  Hypoglycemia and intervention: No  Fever: No  TPN and/or TF: No      /62   Pulse 81   Temp 97.7 °F (36.5 °C) (Oral)   Resp (!) 24   Ht 5' 5" (1.651 m)   Wt 68.5 kg (151 lb 0.2 oz)   LMP  (LMP Unknown) Comment: BEAU/ NO BSO  1986  SpO2 (!) 91%   Breastfeeding No   BMI 25.13 kg/m²     Labs Reviewed and Include    Recent Labs   Lab 01/09/24  0351 01/09/24  0802   * 146*   CALCIUM 8.6* 8.4*   ALBUMIN 2.5* 2.4*   PROT 6.1  --     142   K 4.6 4.9  4.9   CO2 23 22*    107   BUN 15 20   CREATININE 2.2* 2.5*   ALKPHOS 383*  --    ALT 37  --    *  --    BILITOT 1.6*  --      Lab Results   Component Value Date    WBC 6.93 01/09/2024    WBC 6.93 01/09/2024    HGB 8.4 (L) 01/09/2024    HGB 8.4 (L) 01/09/2024    HCT 24.3 (L) 01/09/2024    HCT 24.3 (L) 01/09/2024    MCV 82 01/09/2024    MCV 82 01/09/2024     01/09/2024     01/09/2024     No results for input(s): "TSH", "FREET4" in the last 168 hours.  Lab Results   Component Value Date    HGBA1C 4.9 11/15/2023       Nutritional status:   Body mass index is 25.13 kg/m².  Lab Results   Component Value Date    ALBUMIN 2.4 (L) 01/09/2024    ALBUMIN 2.5 (L) 01/09/2024    ALBUMIN 2.5 (L) 01/09/2024     No results found for: "PREALBUMIN"    Estimated Creatinine Clearance: 19.5 mL/min (A) (based on SCr of 2.5 mg/dL (H)).    Accu-Checks  Recent Labs     01/07/24  1924 01/07/24  2359 01/08/24  0338 01/08/24  0527 01/08/24  0609 01/08/24  1310 01/08/24  1718 01/08/24  1908 01/08/24  2358 01/09/24  0351   POCTGLUCOSE 187* 147* 165* 175* " 234* 272* 223* 174* 116* 115*       Current Medications and/or Treatments Impacting Glycemic Control  Immunotherapy:    Immunosuppressants       None          Steroids:   Hormones (From admission, onward)      None          Pressors:    Autonomic Drugs (From admission, onward)      None          Hyperglycemia/Diabetes Medications:   Antihyperglycemics (From admission, onward)      Start     Stop Route Frequency Ordered    01/02/24 1338  insulin aspart U-100 pen 0-5 Units         -- SubQ Every 4 hours PRN 01/02/24 1238

## 2024-01-09 NOTE — PROGRESS NOTES
01/08/24 1836   Treatment   Treatment Type SLED   Treatment Status New start   Dialysis Machine Number [id number]   Dialyzer Time (hours) 0   BVP (Liters) 0 L   Solutions Labeled and Current  Yes   Access Tunneled Cath;Right;IJ   Catheter Dressing Intact  Yes   Alarms Engaged Yes   CRRT Comments crrt initiated as ordered   Prescription   Time (Hours) 8   Dialysate K + (mEq/L) 4   Dialysate CA + (mEq/L) 2.25   Dialysate HCO3 - (Bicarb) (mEq/L) 30   Dialysate Na + (mEq/L) 140   Cartridge Type Other  (r300)   Dialysate Flow Rate (mL/min) 200   UF Goal Rate 200 mL/hr   CRRT Hourly Documentation   Blood Flow (mL/min) 150   UF Rate 200 cc/hr   Arterial Pressure (mmHg) -50 mmHg   Venous Pressure (mmHg) 30 mmHg   Effluent Pressure (EP) (mmHg) 20 mmHg   Total UF (Hourly Cleared) (mL) 0     SLED initiated as ordered. RIJ permacath aspirated, flushed, and accessed using aseptic technique. Lines connected and secured.

## 2024-01-09 NOTE — PROGRESS NOTES
01/09/24 0015   Treatment   Treatment Type SLED   Treatment Status Daily equipment check   Dialysis Machine Number [id number]   Dialyzer Time (hours) 5.37   BVP (Liters) 49.6 L   Solutions Labeled and Current  Yes   Access Tunneled Cath;Right;IJ   Catheter Dressing Intact  Yes   Alarms Engaged Yes   CRRT Comments Daily check done

## 2024-01-09 NOTE — SUBJECTIVE & OBJECTIVE
Interval History/Significant Events: NAEON. Put out >4L via NGT yesterday. CT AP showing SBO. US showing distended gallbladder with no signs of acute cholecystitis. Patient abdominal exam improved. Complains of shoulder pain and Right sided chest pain which is reproducible with palpation.     Follow-up For: Procedure(s) (LRB):  LYMPHADENECTOMY (Right)  THORACOTOMY (Right)  THORACOTOMY, WITH INITIAL THERAPEUTIC WEDGE RESECTION OF LUNG (Right)    Post-Operative Day: 7 Days Post-Op    Objective:     Vital Signs (Most Recent):  Temp: 97.7 °F (36.5 °C) (01/09/24 0715)  Pulse: 81 (01/09/24 0715)  Resp: (!) 24 (01/09/24 0715)  BP: 129/62 (01/09/24 0700)  SpO2: (!) 91 % (01/09/24 0715) Vital Signs (24h Range):  Temp:  [97.4 °F (36.3 °C)-97.8 °F (36.6 °C)] 97.7 °F (36.5 °C)  Pulse:  [] 81  Resp:  [9-71] 24  SpO2:  [87 %-98 %] 91 %  BP: ()/(41-62) 129/62  Arterial Line BP: ()/(39-67) 163/59     Weight: 68.5 kg (151 lb 0.2 oz)  Body mass index is 25.13 kg/m².      Intake/Output Summary (Last 24 hours) at 1/9/2024 0805  Last data filed at 1/9/2024 0700  Gross per 24 hour   Intake 5330.76 ml   Output 6156 ml   Net -825.24 ml          Physical Exam  Constitutional:       Appearance: Normal appearance. She is ill-appearing.   HENT:      Nose:      Comments: NGT in place  Cardiovascular:      Rate and Rhythm: Normal rate and regular rhythm.      Pulses: Normal pulses.   Pulmonary:      Effort: Pulmonary effort is normal.   Abdominal:      General: Abdomen is flat.      Palpations: Abdomen is soft.      Tenderness: There is no abdominal tenderness.      Comments: CT showing SBO. Abdomen softer than yesterday. NG decompression continued.    Musculoskeletal:         General: Tenderness (R chest) present.      Comments: Right shoulder tenderness. Pt states that she has a trigger point.    Skin:     General: Skin is warm and dry.   Neurological:      General: No focal deficit present.      Mental Status: She is alert  and oriented to person, place, and time.            Vents:  Oxygen Concentration (%): 28 (01/08/24 2344)    Lines/Drains/Airways       Central Venous Catheter Line  Duration                  Hemodialysis Catheter 01/25/23 1501 right internal jugular 348 days              Drain  Duration                  NG/OG Tube 01/08/24 1303 nasogastric Left nostril <1 day              Arterial Line  Duration             Arterial Line 01/08/24 1324 Right Radial <1 day              Peripheral Intravenous Line  Duration                  Hemodialysis AV Fistula 09/01/23 0800 Left forearm 130 days         Peripheral IV - Single Lumen 01/08/24 1108 22 G Posterior;Right Wrist <1 day         Peripheral IV - Single Lumen 01/08/24 1357 20 G Posterior;Right Forearm <1 day         Peripheral IV - Single Lumen 01/09/24 0542 20 G Anterior;Right Forearm <1 day                    Significant Labs:    CBC/Anemia Profile:  Recent Labs   Lab 01/08/24  1314 01/08/24  1646 01/09/24  0351   WBC 8.05 7.59 6.93  6.93   HGB 8.2* 7.0* 8.4*  8.4*   HCT 23.5* 20.0* 24.3*  24.3*    304 221  221   MCV 88 86 82  82   RDW 15.3* 15.1* 17.2*  17.2*        Chemistries:  Recent Labs   Lab 01/08/24  1155 01/08/24  1310 01/08/24  1646 01/08/24  2106 01/09/24  0044 01/09/24  0351   * 134*  135*   < > 140 140 141   K 5.2*  5.2* 4.8  4.9   < > 4.5  4.5 4.5  4.5 4.6   CL 78* 81*  83*   < > 101 104 104   CO2 28 26  26   < > 25 24 23   BUN 72* 75*  74*   < > 36* 19 15   CREATININE 7.3* 7.2*  7.6*   < > 3.6* 2.1* 2.2*   CALCIUM 9.1 8.6*  8.7   < > 8.4* 8.3* 8.6*   ALBUMIN 2.4* 2.1*  2.1*   < > 2.5* 2.5* 2.5*   PROT 6.8 5.9*  --   --   --  6.1   BILITOT 3.4* 3.1*  --   --   --  1.6*   ALKPHOS 563* 514*  --   --   --  383*   ALT 50* 46*  --   --   --  37   * 154*  --   --   --  105*   MG 3.3* 3.1*  --  2.4  --  2.2   PHOS 10.7* 10.6*  10.8*   < > 4.8* 2.8 2.6*    < > = values in this interval not displayed.       All pertinent labs  within the past 24 hours have been reviewed.    Significant Imaging:  I have reviewed all pertinent imaging results/findings within the past 24 hours.

## 2024-01-09 NOTE — PT/OT/SLP RE-EVAL
Physical Therapy  [id number] and treatment with OT    Patient Name:  Lily Green   MRN:  [id number]    Recommendations:     Discharge Recommendations: Low Intensity Therapy  Discharge Equipment Recommendations: none   Barriers to discharge: None    Assessment:     Lily Green is a 73 y.o. female admitted with a medical diagnosis of Malignant carcinoid tumor of bronchus.  She presents with the following impairments/functional limitations: impaired balance, decreased safety awareness, impaired endurance, impaired functional mobility, gait instability pt tolerated treatment well and will benefit from skilled PT 3x/wk to progress physically. Pt will be able to discharge home with no DME needs. Patient currently demonstrates a need for low intensity therapy on a scheduled basis secondary to a decline in functional status due to surgical procedure. Pt was evaluated 1/3 with dx of malignant CA tumor of bronchus. Pt is s/p lymphadenectomy, thoracotomy, resection wedge R lung 1/2. Pt was transferred to ICU 1/5 with tachycardia with syncope.     SOCIAL; pt lives alone in 1 story slab. Pt has caregiver 8 hours/day 3 days/wk.  She can assist more if needed. Pt used no AD but own shower chair, SC, RW,  3 in 1 commode.    Rehab Prognosis:  good ; patient would benefit from acute skilled PT services to address these deficits and reach maximum level of function.      Recent Surgery: Procedure(s) (LRB):  LYMPHADENECTOMY (Right)  THORACOTOMY (Right)  THORACOTOMY, WITH INITIAL THERAPEUTIC WEDGE RESECTION OF LUNG (Right) 7 Days Post-Op    Plan:     During this hospitalization, patient to be seen 3 x/week to address the above listed problems via gait training, therapeutic activities  Plan of Care Expires:  02/08/24  Plan of Care Reviewed with: patient, caregiver    Subjective     Communicated with nurse  prior to session.  Patient found supine with telemetry, arterial line, blood pressure cuff, pulse ox  (continuous), NG tube, oxygen, peripheral IV upon PT entry to room, agreeable to evaluation.      Chief Complaint: pt was nervous about getting out of bed.   Patient comments/goals: to get better and go home.   Pain/Comfort:  Pain Rating 1: 0/10  Pain Rating Post-Intervention 1: 0/10    Patients cultural, spiritual, Roman [employer] conflicts given the current situation: no      Objective:     Patient found with: telemetry, arterial line, blood pressure cuff, pulse ox (continuous), NG tube, oxygen, peripheral IV     General Precautions: Standard, fall  Orthopedic Precautions: N/A  Braces: N/A  Respiratory Status: Nasal cannula, flow 3 L/min    Exams:  Cognitive Exam:  Patient is oriented to Person, Place, Time, and Situation  RLE ROM: WNL  RLE Strength: WNL  LLE ROM: WNL  LLE Strength: WNL    Functional Mobility:  Bed Mobility:  pt needed verbal cues for hand placement and sequencing for functional mobility.    Rolling Left:  minimum assistance  Supine to Sit: minimum assistance    Transfers:     Sit to Stand:  contact guard assistance with hand-held assist    Gait: pt received gait training ~ 3 steps with CGA and O2 intact.     Balance: pt sat on EOB with SBA and was CGA standing balance.     Due to pt complex medical condition, the skill of 2 licensed therapists is needed to maximize treatment session and progression towards goals  Pt white board updated with current therapists name and level of mobility assistance needed.     AM-PAC 6 CLICK MOBILITY  Total Score:17       Treatment and Education:   Pt and caregiver received verbal instructions in role of PT and POC. Both verbally expressed understanding of such.     Patient left up in chair with all lines intact, call button in reach, RN  notified, and caregiver present.    GOALS:   Multidisciplinary Problems       Physical Therapy Goals          Problem: Physical Therapy    Goal Priority Disciplines Outcome Goal Variances Interventions   Physical Therapy Goal     PT,  PT/OT Ongoing, Progressing     Description: PT goals until     1. Pt supine to sit with supervision-not met  2. Pt sit to supine with supervision-not met  3. Pt sit to stand with AD if needed with supervision-not met  4. Pt to perform gait 150 ft with AD if needed with supervision.-not met  5. Pt to go up/down curb step with AD if needed with CGA.-not met                         History:     Past Medical History:   Diagnosis Date    Anxiety     Celiac disease 2018    Celiac disease     Depression     Diabetes mellitus     Family history of breast cancer in mother      at age 68    Hyperlipidemia     Hypertension     Meniere disease     Meniere's disease, unspecified ear     Menopause     Peptic ulcer     Reflux esophagitis     Urinary tract infection     Vaginal infection     Vaginal Pap smear 2014    Pap/Hpv Negative        Past Surgical History:   Procedure Laterality Date    APPENDECTOMY      BREAST SURGERY      Tags    CARPAL TUNNEL RELEASE Bilateral 2017    ,     CLOSURE, COLOSTOMY Left 2023    Procedure: CLOSURE, COLOSTOMY;  Surgeon: Manuel Javier MD;  Location: [facility] OR;  Service: General;  Laterality: Left;    COLONOSCOPY  2018    Normal  (Cornell Velazco)     COLOSTOMY Right 2023    Procedure: CREATION, COLOSTOMY;  Surgeon: Manuel Javier MD;  Location: [facility] OR;  Service: General;  Laterality: Right;    DILATION AND CURETTAGE OF UTERUS  1986    DUPUYTREN CONTRACTURE RELEASE Bilateral 2017    HYSTERECTOMY  1987    BEAU w/ appy, no BSO     INJECTION OF NEUROLYTIC AGENT AROUND LUMBAR SYMPATHETIC NERVE N/A 2022    Procedure: BLOCK, LUMBAR SYMPATHETIC;  Surgeon: Souleymane Rizo Jr., MD;  Location: [facility] PAIN MGT;  Service: Pain Management;  Laterality: N/A;  no pacemaker   pt is diabetic     INJECTION OF NEUROLYTIC AGENT AROUND LUMBAR SYMPATHETIC NERVE N/A 2022    Procedure: BLOCK, LUMBAR SYMPATHETIC;  Surgeon: Souleymane Rizo Jr., MD;  Location: [facility]  PAIN MGT;  Service: Pain Management;  Laterality: N/A;  diabetic     INSERTION OF TUNNELED CENTRAL VENOUS HEMODIALYSIS CATHETER Right 1/25/2023    Procedure: INSERTION, CATHETER, HEMODIALYSIS, DUAL LUMEN;  Surgeon: Manuel Javier MD;  Location: [facility] OR;  Service: General;  Laterality: Right;    INSERTION, CATHETER, TUNNELED Left 1/28/2023    Procedure: Insertion,catheter,tunneled;  Surgeon: Watson Fontenot MD;  Location: [facility] OR;  Service: General;  Laterality: Left;    LAPAROTOMY, EXPLORATORY N/A 1/14/2023    Procedure: LAPAROTOMY, EXPLORATORY;  Surgeon: Manuel Javier MD;  Location: [facility] OR;  Service: General;  Laterality: N/A;    LAPAROTOMY, EXPLORATORY N/A 1/16/2023    Procedure: LAPAROTOMY, EXPLORATORY;  Surgeon: Manuel Javier MD;  Location: [facility] OR;  Service: General;  Laterality: N/A;    LYMPHADENECTOMY Right 1/2/2024    Procedure: LYMPHADENECTOMY;  Surgeon: Tan Thomson MD;  Location: [facility] OR 2ND FLR;  Service: Cardiothoracic;  Laterality: Right;    REMOVAL OF CATHETER Right 1/28/2023    Procedure: REMOVAL, CATHETER;  Surgeon: Watson Fontenot MD;  Location: [facility] OR;  Service: General;  Laterality: Right;    REMOVAL, TUNNELED CATH Right 1/25/2023    Procedure: REMOVAL, TUNNELED CATH;  Surgeon: Manuel Javier MD;  Location: [facility] OR;  Service: General;  Laterality: Right;    ROBOTIC BRONCHOSCOPY N/A 10/20/2023    Procedure: ROBOTIC BRONCHOSCOPY;  Surgeon: Mayda Monzon MD;  Location: [facility] OR 2ND FLR;  Service: Pulmonary;  Laterality: N/A;    SHOULDER SURGERY  2009 & 2010    right rotator cuff    THORACOTOMY Right 1/2/2024    Procedure: THORACOTOMY;  Surgeon: Tan Thomson MD;  Location: [facility] OR 2ND FLR;  Service: Cardiothoracic;  Laterality: Right;    THORACOTOMY, WITH INITIAL THERAPEUTIC WEDGE RESECTION OF LUNG Right 1/2/2024    Procedure: THORACOTOMY, WITH INITIAL THERAPEUTIC WEDGE RESECTION OF LUNG;  Surgeon: Tan Thomson MD;  Location: [facility] OR 2ND FLR;  Service:  Cardiothoracic;  Laterality: Right;    TONSILLECTOMY      UPPER GASTROINTESTINAL ENDOSCOPY  04/2018       Time Tracking:     PT Received On: 01/09/24  PT Start Time: 0753     PT Stop Time: 0809  PT Total Time (min): 16 min     Billable Minutes: Re-eval 8 min  and Therapeutic Activity 8 min       01/09/2024

## 2024-01-09 NOTE — PLAN OF CARE
"      SICU PLAN OF CARE NOTE    Dx: Malignant carcinoid tumor of bronchus    Shift Events: 1uPRBC, Stu titrated off, SLED for 8hrs    Goals of Care: MAP>55, SBP>90    Neuro: AAO x4, Follows Commands, and Moves All Extremities    Cardiac: NSR/AFIB    Vital Signs: BP (!) 111/50 (BP Location: Right arm, Patient Position: Lying)   Pulse 74   Temp 97.4 °F (36.3 °C) (Oral)   Resp 20   Ht 5' 5" (1.651 m)   Wt 68.5 kg (151 lb 0.2 oz)   LMP  (LMP Unknown) Comment: BEAU/ NO BSO  1986  SpO2 (!) 92%   Breastfeeding No   BMI 25.13 kg/m²     Respiratory: 3L Nasal Cannula    Diet: NPO    Drains: NG T output: 1600 cc/shift    Gtts: Amiodarone and Heparin    Urine Output: Anuric     Labs/Accuchecks: Daily Labs, Accuchecks Q4, Renal labs Q4.    Skin: No new skin breakdown noted.Specialty bed plugged in and working. Waffle mattress inflated and in use.   Pt turned Q2hr as tolerated. Foams placed to heels and sacrum for addition breakdown prevention.     PoC reviewed with pt/family. All questions/concerns addressed. VSS. See flowsheets for full assessment.        "

## 2024-01-09 NOTE — PROGRESS NOTES
[facility]  Endocrinology  Progress Note    Admit Date: 1/2/2024     Reason for Consult: Management of T2DM, Hyperglycemia     Surgical Procedure and Date: 1/2/24--   LYMPHADENECTOMY (Right)  THORACOTOMY (Right)  THORACOTOMY, WITH INITIAL THERAPEUTIC WEDGE RESECTION OF LUNG (Right)    Diabetes diagnosis year: in her 50s    Home Diabetes Medications:    LDC SSI   Blood sugar 150 to 200, + 1 unit  Blood sugar 201 to 250, + 2 units  Blood sugar 251 to 300, + 3 units  Blood sugar 301 to 350, + 4 units   Blood sugar greater than 350, + 5 units    How often checking glucose at home? >4 x day   BG readings on regimen: 100-120s  Hypoglycemia on the regimen?  denies   Missed doses on regimen?  No    Diabetes Complications include:     Nephropathy     Complicating diabetes co morbidities:   ESRD and Glucocorticoid use       HPI:   Patient is a 73 y.o. female with DDD, benign essential tremor, meniere's disease, HTN, HLD, DM2, GERD, Celiac's, IBS who is found to have RML Carcinoid tumor.  Chest CT 10/18/23 revealed 1.2 x 1.1 cm right middle lobe nodule, previously 1.2 x 1.0 cm in July 2023. Underwent Robotic Bronchoscopy 10/20/23; path: RML nodule positive for well-differentiated neuroendocrine neoplasm, carcinoid tumor. PET/CT Cu64 11/17/23 showed known RML nodule with hypermetabolic activity. Now presents s/p thoracotomy with wedge resection of right lung. Endocrine consulted for BG management.         Interval HPI:   Overnight events: No acute events overnight. Patient in room 94071 CVICU/99237 CVICU A. Blood glucose worsening. BG at and above goal on current insulin regimen (SSI ). Steroid use- None. 7 Days Post-Op  Renal function- Abnormal - Creatinine 2.2  Vasopressors-  None       Endocrine will continue to follow and manage insulin orders inpatient.         Diet NPO     Eating:   NPO  Nausea: No  Hypoglycemia and intervention: No  Fever: No  TPN and/or TF: No      /62   Pulse 81   Temp  "97.7 °F (36.5 °C) (Oral)   Resp (!) 24   Ht 5' 5" (1.651 m)   Wt 68.5 kg (151 lb 0.2 oz)   LMP  (LMP Unknown) Comment: BEAU/ NO BSO  1986  SpO2 (!) 91%   Breastfeeding No   BMI 25.13 kg/m²     Labs Reviewed and Include    Recent Labs   Lab 01/09/24  0351 01/09/24  0802   * 146*   CALCIUM 8.6* 8.4*   ALBUMIN 2.5* 2.4*   PROT 6.1  --     142   K 4.6 4.9  4.9   CO2 23 22*    107   BUN 15 20   CREATININE 2.2* 2.5*   ALKPHOS 383*  --    ALT 37  --    *  --    BILITOT 1.6*  --      Lab Results   Component Value Date    WBC 6.93 01/09/2024    WBC 6.93 01/09/2024    HGB 8.4 (L) 01/09/2024    HGB 8.4 (L) 01/09/2024    HCT 24.3 (L) 01/09/2024    HCT 24.3 (L) 01/09/2024    MCV 82 01/09/2024    MCV 82 01/09/2024     01/09/2024     01/09/2024     No results for input(s): "TSH", "FREET4" in the last 168 hours.  Lab Results   Component Value Date    HGBA1C 4.9 11/15/2023       Nutritional status:   Body mass index is 25.13 kg/m².  Lab Results   Component Value Date    ALBUMIN 2.4 (L) 01/09/2024    ALBUMIN 2.5 (L) 01/09/2024    ALBUMIN 2.5 (L) 01/09/2024     No results found for: "PREALBUMIN"    Estimated Creatinine Clearance: 19.5 mL/min (A) (based on SCr of 2.5 mg/dL (H)).    Accu-Checks  Recent Labs     01/07/24  1924 01/07/24  2359 01/08/24  0338 01/08/24  0527 01/08/24  0609 01/08/24  1310 01/08/24  1718 01/08/24  1908 01/08/24  2358 01/09/24  0351   POCTGLUCOSE 187* 147* 165* 175* 234* 272* 223* 174* 116* 115*       Current Medications and/or Treatments Impacting Glycemic Control  Immunotherapy:    Immunosuppressants       None          Steroids:   Hormones (From admission, onward)      None          Pressors:    Autonomic Drugs (From admission, onward)      None          Hyperglycemia/Diabetes Medications:   Antihyperglycemics (From admission, onward)      Start     Stop Route Frequency Ordered    01/02/24 1338  insulin aspart U-100 pen 0-5 Units         -- SubQ Every 4 hours " PRN 01/02/24 1238            ASSESSMENT and PLAN    Renal/  ESRD (end stage renal disease)    Titrate insulin slowly to avoid hypoglycemia as the risk of hypoglycemia increases with decreased creatinine clearance.      Oncology  * Malignant carcinoid tumor of bronchus  Managed per primary team        Endocrine  Type 2 diabetes mellitus with diabetic polyneuropathy, without long-term current use of insulin  BG goal: 140-180  BG excursions noted related to shifting potassium.     - Novolog Low dose correction with ISF 50 starting at 150 prn ac/hs   - POCT Glucose before meals and at bedtime  - Hypoglycemia protocol in place      ** Please notify Endocrine for any change and/or advance in diet**  ** Please call Endocrine for any BG related issues **     Discharge Planning:   TBD. Please notify endocrinology prior to discharge.             Pranav Perry, DNP, FNP  Endocrinology  [facility]

## 2024-01-09 NOTE — PROGRESS NOTES
[facility]  Thoracic Surgery  Progress Note    Subjective:     History of Present Illness:  No notes on file    Post-Op Info:  Procedure(s) (LRB):  LYMPHADENECTOMY (Right)  THORACOTOMY (Right)  THORACOTOMY, WITH INITIAL THERAPEUTIC WEDGE RESECTION OF LUNG (Right)   7 Days Post-Op     Interval History:   Placed back on amio gtt yesterday, heparin gtt   NGT placed yesterday with large volume gastric decompressino - close to 5 L out since   Received ~ 3 L resuscitation eysterday, 1 uPRBC and 1 L NS overnight.   CT abd/pelvis with possible SBO.  SLED overnight  This am looking and feeling much better. Has had some Bms. In sinus with rate control. Pressures much improved. Labs looking much better.       Medications:  Continuous Infusions:   sodium chloride 0.9% 200 mL/hr at 01/09/24 0300    amiodarone in dextrose 5% 0.5 mg/min (01/09/24 0800)    heparin (porcine) in D5W 12 Units/kg/hr (01/09/24 0800)    phenylephrine Stopped (01/08/24 2137)       Scheduled Meds:   acetaminophen  1,000 mg Intravenous Q8H    heparin (PORCINE)  60 Units/kg (Adjusted) Intravenous Once    LIDOcaine  1 patch Transdermal Q24H    metoprolol  5 mg Intravenous Q6H    mupirocin   Nasal BID    pantoprazole  40 mg Intravenous Daily    sodium chloride 0.9%  1,000 mL Intravenous Once     PRN Meds:0.9%  NaCl infusion (for blood administration), bisacodyL, dextrose 10%, dextrose 10%, glucagon (human recombinant), glucose, glucose, heparin (PORCINE), heparin (PORCINE), HYDROmorphone, insulin aspart U-100, magnesium sulfate IVPB, ondansetron, sodium phosphate 20.01 mmol in dextrose 5 % (D5W) 250 mL IVPB, sodium phosphate 30 mmol in dextrose 5 % (D5W) 250 mL IVPB, sodium phosphate 39.99 mmol in dextrose 5 % (D5W) 250 mL IVPB     Review of patient's allergies indicates:   Allergen Reactions    Crestor [rosuvastatin] Swelling    Gluten protein      Objective:     Vital Signs (Most Recent):  Temp: 97.7 °F (36.5 °C) (01/09/24  0715)  Pulse: 81 (01/09/24 0715)  Resp: (!) 24 (01/09/24 0715)  BP: 129/62 (01/09/24 0700)  SpO2: (!) 91 % (01/09/24 0715) Vital Signs (24h Range):  Temp:  [97.4 °F (36.3 °C)-97.8 °F (36.6 °C)] 97.7 °F (36.5 °C)  Pulse:  [] 81  Resp:  [9-71] 24  SpO2:  [87 %-98 %] 91 %  BP: ()/(41-62) 129/62  Arterial Line BP: ()/(39-67) 163/59     Intake/Output - Last 3 Shifts         01/07 0700 01/08 0659 01/08 0700 01/09 0659 01/09 0700  01/10 0659    P.O. 490      I.V. (mL/kg)  2791.6 (40.8) 72.2 (1.1)    IV Piggyback 520.1 2491.1     Total Intake(mL/kg) 1010.1 (14.7) 5282.7 (77.1) 72.2 (1.1)    Drains  4650     Other  1506     Stool  0     Chest Tube 50      Total Output 50 6156     Net +960.1 -873.3 +72.2           Stool Occurrence  1 x             SpO2: (!) 91 %       Physical Exam  Vitals and nursing note reviewed.   Constitutional:       Appearance: Normal appearance.   HENT:      Head: Normocephalic and atraumatic.      Nose:      Comments: NGT with dark feculent output, slightly thinner than yesterday   Cardiovascular:      Rate and Rhythm: Normal rate and regular rhythm.      Comments: Right radial a line   Pulmonary:      Effort: Pulmonary effort is normal. No respiratory distress.      Comments: Right posterior chest incision c/d/I    Abdominal:      General: Abdomen is flat. There is no distension.      Palpations: Abdomen is soft. There is no mass.      Tenderness: There is no abdominal tenderness.      Hernia: No hernia is present.      Comments: Soft, nondistended  Nontender    Musculoskeletal:      Right lower leg: No edema.      Left lower leg: No edema.   Skin:     General: Skin is warm and dry.   Neurological:      General: No focal deficit present.      Mental Status: She is alert and oriented to person, place, and time.   Psychiatric:         Mood and Affect: Mood normal.         Behavior: Behavior normal.            Significant Labs:  BMP:   Recent Labs   Lab 01/09/24  0351 01/09/24  0802    * 146*    142   K 4.6 4.9  4.9    107   CO2 23 22*   BUN 15 20   CREATININE 2.2* 2.5*   CALCIUM 8.6* 8.4*   MG 2.2  --        CBC:   Recent Labs   Lab 01/09/24  0351   WBC 6.93  6.93   RBC 2.96*  2.96*   HGB 8.4*  8.4*   HCT 24.3*  24.3*     221   MCV 82  82   MCH 28.4  28.4   MCHC 34.6  34.6         Significant Diagnostics:  CT: I have reviewed all pertinent results/findings within the past 24 hours    VTE Risk Mitigation (From admission, onward)           Ordered     heparin 25,000 units in dextrose 5% (100 units/ml) IV bolus from bag - ADDITIONAL PRN BOLUS - 30 units/kg  As needed (PRN)        Question:  Heparin Infusion Adjustment (DO NOT MODIFY ANSWER)  Answer:  \\[email or web address]\epic\Images\Pharmacy\HeparinInfusions\heparin LOW INTENSITY nomogram for [facility] [id number].pdf    01/08/24 1318     heparin 25,000 units in dextrose 5% (100 units/ml) IV bolus from bag - ADDITIONAL PRN BOLUS - 60 units/kg  As needed (PRN)        Question:  Heparin Infusion Adjustment (DO NOT MODIFY ANSWER)  Answer:  \\[email or web address]\epic\Images\Pharmacy\HeparinInfusions\heparin LOW INTENSITY nomogram for [facility] [id number].pdf    01/08/24 1318     heparin 25,000 units in dextrose 5% (100 units/ml) IV bolus from bag INITIAL BOLUS  Once        Question:  Heparin Infusion Adjustment (DO NOT MODIFY ANSWER)  Answer:  \\[email or web address]\epic\Images\Pharmacy\HeparinInfusions\heparin LOW INTENSITY nomogram for [facility] [id number].pdf    01/08/24 1318     heparin 25,000 units in dextrose 5% 250 mL (100 units/mL) infusion LOW INTENSITY nomogram - [facility]  Continuous        Question:  Begin at (units/kg/hr)  Answer:  12    01/08/24 1318     heparin (porcine) injection 1,000 Units  As needed (PRN)         01/03/24 1123     IP VTE HIGH RISK PATIENT  Once         01/02/24 1226     Place CARMELITA hose  Until discontinued         01/02/24 1226     Place sequential compression device  Until discontinued         01/02/24 1226                  Assessment/Plan:      * Malignant carcinoid tumor of bronchus  72 yo female with ESRD (TTS), DDD, benign essential tremor, meniere's disease, HTN, HLD, DM2, GERD, Celiac's, IBS and 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 Stepped up to the ICU 1/5 with afib RVR refractory to IV metoprolol, started on amio gtt with conversion.     - CT removed 1/8  - Ngt placed 1/8 with alrge volume output; CT abd/pelvis 1/8 with SBO. Continue to monitor output today. As output slows, if she continues to have Bms and flatus, would proceed with GGC later today vs tomorrow   - US 1/8 with no biliary abnormality   - Cards consult for refractory afib RVR, s/p chemical cardioversion. Return of afib RVR 1/7-1/8, restarted on amio gtt, heparin gtt.   Appreciate cards assistance.   Heparin gtt while NPO; convert to PO following ROBF   Amio gtt w/ metop IV  Echo 1/6 with normal function  - NPO  - Nephrology following, appreciate assistance. SLED 1/8 overnight. Continue SLED   - Q8H RFP while NPO; daily CMP, CBC   - Appreciate SICU assistance         Kat Flores MD  Thoracic Surgery  [facility]

## 2024-01-10 ENCOUNTER — PATIENT MESSAGE (OUTPATIENT)
Dept: PAIN MEDICINE | Facility: CLINIC | Age: 74
End: 2024-01-10
Payer: MEDICARE

## 2024-01-10 ENCOUNTER — TELEPHONE (OUTPATIENT)
Dept: PAIN MEDICINE | Facility: CLINIC | Age: 74
End: 2024-01-10
Payer: MEDICARE

## 2024-01-10 DIAGNOSIS — E11.42 TYPE 2 DIABETES MELLITUS WITH DIABETIC POLYNEUROPATHY, WITHOUT LONG-TERM CURRENT USE OF INSULIN: Primary | ICD-10-CM

## 2024-01-10 LAB
ALBUMIN SERPL BCP-MCNC: 2.3 G/DL (ref 3.5–5.2)
ALBUMIN SERPL BCP-MCNC: 2.4 G/DL (ref 3.5–5.2)
ALP SERPL-CCNC: 295 U/L (ref 55–135)
ALT SERPL W/O P-5'-P-CCNC: 25 U/L (ref 10–44)
ANION GAP SERPL CALC-SCNC: 14 MMOL/L (ref 8–16)
ANION GAP SERPL CALC-SCNC: 15 MMOL/L (ref 8–16)
ANION GAP SERPL CALC-SCNC: 16 MMOL/L (ref 8–16)
ANION GAP SERPL CALC-SCNC: 16 MMOL/L (ref 8–16)
ANION GAP SERPL CALC-SCNC: 19 MMOL/L (ref 8–16)
ANION GAP SERPL CALC-SCNC: 21 MMOL/L (ref 8–16)
ANISOCYTOSIS BLD QL SMEAR: SLIGHT
APTT PPP: 30.2 SEC (ref 21–32)
APTT PPP: 39.2 SEC (ref 21–32)
APTT PPP: 43.3 SEC (ref 21–32)
AST SERPL-CCNC: 54 U/L (ref 10–40)
BASOPHILS # BLD AUTO: 0.04 K/UL (ref 0–0.2)
BASOPHILS NFR BLD: 0.4 % (ref 0–1.9)
BILIRUB SERPL-MCNC: 1 MG/DL (ref 0.1–1)
BUN SERPL-MCNC: 31 MG/DL (ref 8–23)
BUN SERPL-MCNC: 33 MG/DL (ref 8–23)
BUN SERPL-MCNC: 35 MG/DL (ref 8–23)
BUN SERPL-MCNC: 40 MG/DL (ref 8–23)
BUN SERPL-MCNC: 40 MG/DL (ref 8–23)
BUN SERPL-MCNC: 48 MG/DL (ref 8–23)
CALCIUM SERPL-MCNC: 8.3 MG/DL (ref 8.7–10.5)
CALCIUM SERPL-MCNC: 8.4 MG/DL (ref 8.7–10.5)
CALCIUM SERPL-MCNC: 8.6 MG/DL (ref 8.7–10.5)
CALCIUM SERPL-MCNC: 8.7 MG/DL (ref 8.7–10.5)
CALCIUM SERPL-MCNC: 8.9 MG/DL (ref 8.7–10.5)
CALCIUM SERPL-MCNC: 9 MG/DL (ref 8.7–10.5)
CHLORIDE SERPL-SCNC: 104 MMOL/L (ref 95–110)
CHLORIDE SERPL-SCNC: 105 MMOL/L (ref 95–110)
CHLORIDE SERPL-SCNC: 105 MMOL/L (ref 95–110)
CHLORIDE SERPL-SCNC: 108 MMOL/L (ref 95–110)
CHLORIDE SERPL-SCNC: 108 MMOL/L (ref 95–110)
CHLORIDE SERPL-SCNC: 109 MMOL/L (ref 95–110)
CO2 SERPL-SCNC: 12 MMOL/L (ref 23–29)
CO2 SERPL-SCNC: 13 MMOL/L (ref 23–29)
CO2 SERPL-SCNC: 13 MMOL/L (ref 23–29)
CO2 SERPL-SCNC: 16 MMOL/L (ref 23–29)
CO2 SERPL-SCNC: 17 MMOL/L (ref 23–29)
CO2 SERPL-SCNC: 17 MMOL/L (ref 23–29)
CREAT SERPL-MCNC: 3.3 MG/DL (ref 0.5–1.4)
CREAT SERPL-MCNC: 3.4 MG/DL (ref 0.5–1.4)
CREAT SERPL-MCNC: 3.7 MG/DL (ref 0.5–1.4)
CREAT SERPL-MCNC: 4.1 MG/DL (ref 0.5–1.4)
CREAT SERPL-MCNC: 4.1 MG/DL (ref 0.5–1.4)
CREAT SERPL-MCNC: 4.3 MG/DL (ref 0.5–1.4)
DIFFERENTIAL METHOD BLD: ABNORMAL
EOSINOPHIL # BLD AUTO: 0 K/UL (ref 0–0.5)
EOSINOPHIL NFR BLD: 0.3 % (ref 0–8)
ERYTHROCYTE [DISTWIDTH] IN BLOOD BY AUTOMATED COUNT: 18.9 % (ref 11.5–14.5)
EST. GFR  (NO RACE VARIABLE): 10.3 ML/MIN/1.73 M^2
EST. GFR  (NO RACE VARIABLE): 10.9 ML/MIN/1.73 M^2
EST. GFR  (NO RACE VARIABLE): 10.9 ML/MIN/1.73 M^2
EST. GFR  (NO RACE VARIABLE): 12.4 ML/MIN/1.73 M^2
EST. GFR  (NO RACE VARIABLE): 13.7 ML/MIN/1.73 M^2
EST. GFR  (NO RACE VARIABLE): 14.2 ML/MIN/1.73 M^2
GLUCOSE SERPL-MCNC: 143 MG/DL (ref 70–110)
GLUCOSE SERPL-MCNC: 156 MG/DL (ref 70–110)
GLUCOSE SERPL-MCNC: 158 MG/DL (ref 70–110)
GLUCOSE SERPL-MCNC: 196 MG/DL (ref 70–110)
GLUCOSE SERPL-MCNC: 197 MG/DL (ref 70–110)
GLUCOSE SERPL-MCNC: 200 MG/DL (ref 70–110)
HCT VFR BLD AUTO: 25.8 % (ref 37–48.5)
HGB BLD-MCNC: 8.5 G/DL (ref 12–16)
HYPOCHROMIA BLD QL SMEAR: ABNORMAL
IMM GRANULOCYTES # BLD AUTO: 0.44 K/UL (ref 0–0.04)
IMM GRANULOCYTES NFR BLD AUTO: 4.4 % (ref 0–0.5)
INR PPP: 1.2 (ref 0.8–1.2)
LYMPHOCYTES # BLD AUTO: 1.1 K/UL (ref 1–4.8)
LYMPHOCYTES NFR BLD: 10.9 % (ref 18–48)
MAGNESIUM SERPL-MCNC: 2.4 MG/DL (ref 1.6–2.6)
MAGNESIUM SERPL-MCNC: 2.5 MG/DL (ref 1.6–2.6)
MAGNESIUM SERPL-MCNC: 2.5 MG/DL (ref 1.6–2.6)
MAGNESIUM SERPL-MCNC: 2.6 MG/DL (ref 1.6–2.6)
MCH RBC QN AUTO: 28.5 PG (ref 27–31)
MCHC RBC AUTO-ENTMCNC: 32.9 G/DL (ref 32–36)
MCV RBC AUTO: 87 FL (ref 82–98)
MONOCYTES # BLD AUTO: 1.4 K/UL (ref 0.3–1)
MONOCYTES NFR BLD: 14.2 % (ref 4–15)
NEUTROPHILS # BLD AUTO: 7 K/UL (ref 1.8–7.7)
NEUTROPHILS NFR BLD: 69.8 % (ref 38–73)
NRBC BLD-RTO: 0 /100 WBC
PHOSPHATE SERPL-MCNC: 2.9 MG/DL (ref 2.7–4.5)
PHOSPHATE SERPL-MCNC: 3.2 MG/DL (ref 2.7–4.5)
PHOSPHATE SERPL-MCNC: 3.3 MG/DL (ref 2.7–4.5)
PHOSPHATE SERPL-MCNC: 3.4 MG/DL (ref 2.7–4.5)
PHOSPHATE SERPL-MCNC: 4.2 MG/DL (ref 2.7–4.5)
PLATELET # BLD AUTO: 238 K/UL (ref 150–450)
PLATELET BLD QL SMEAR: ABNORMAL
PMV BLD AUTO: 10.1 FL (ref 9.2–12.9)
POCT GLUCOSE: 150 MG/DL (ref 70–110)
POCT GLUCOSE: 162 MG/DL (ref 70–110)
POCT GLUCOSE: 192 MG/DL (ref 70–110)
POCT GLUCOSE: 215 MG/DL (ref 70–110)
POIKILOCYTOSIS BLD QL SMEAR: SLIGHT
POLYCHROMASIA BLD QL SMEAR: ABNORMAL
POTASSIUM SERPL-SCNC: 4.6 MMOL/L (ref 3.5–5.1)
POTASSIUM SERPL-SCNC: 4.6 MMOL/L (ref 3.5–5.1)
POTASSIUM SERPL-SCNC: 4.7 MMOL/L (ref 3.5–5.1)
POTASSIUM SERPL-SCNC: 4.9 MMOL/L (ref 3.5–5.1)
POTASSIUM SERPL-SCNC: 4.9 MMOL/L (ref 3.5–5.1)
POTASSIUM SERPL-SCNC: 5.2 MMOL/L (ref 3.5–5.1)
PROT SERPL-MCNC: 5.9 G/DL (ref 6–8.4)
PROTHROMBIN TIME: 13 SEC (ref 9–12.5)
RBC # BLD AUTO: 2.98 M/UL (ref 4–5.4)
SODIUM SERPL-SCNC: 137 MMOL/L (ref 136–145)
SODIUM SERPL-SCNC: 138 MMOL/L (ref 136–145)
SODIUM SERPL-SCNC: 139 MMOL/L (ref 136–145)
SODIUM SERPL-SCNC: 140 MMOL/L (ref 136–145)
TARGETS BLD QL SMEAR: ABNORMAL
WBC # BLD AUTO: 10.08 K/UL (ref 3.9–12.7)

## 2024-01-10 PROCEDURE — 25000003 PHARM REV CODE 250: Performed by: SURGERY

## 2024-01-10 PROCEDURE — 85730 THROMBOPLASTIN TIME PARTIAL: CPT

## 2024-01-10 PROCEDURE — 11000001 HC ACUTE MED/SURG PRIVATE ROOM

## 2024-01-10 PROCEDURE — 85730 THROMBOPLASTIN TIME PARTIAL: CPT | Mod: 91

## 2024-01-10 PROCEDURE — 99900035 HC TECH TIME PER 15 MIN (STAT)

## 2024-01-10 PROCEDURE — 25000003 PHARM REV CODE 250

## 2024-01-10 PROCEDURE — 27000221 HC OXYGEN, UP TO 24 HOURS

## 2024-01-10 PROCEDURE — 63600175 PHARM REV CODE 636 W HCPCS: Performed by: SURGERY

## 2024-01-10 PROCEDURE — 94667 MNPJ CHEST WALL 1ST: CPT

## 2024-01-10 PROCEDURE — 63600175 PHARM REV CODE 636 W HCPCS: Mod: JZ | Performed by: NURSE PRACTITIONER

## 2024-01-10 PROCEDURE — 84100 ASSAY OF PHOSPHORUS: CPT | Performed by: SURGERY

## 2024-01-10 PROCEDURE — 80069 RENAL FUNCTION PANEL: CPT | Performed by: NURSE PRACTITIONER

## 2024-01-10 PROCEDURE — 80048 BASIC METABOLIC PNL TOTAL CA: CPT | Mod: XB

## 2024-01-10 PROCEDURE — 83735 ASSAY OF MAGNESIUM: CPT | Performed by: NURSE PRACTITIONER

## 2024-01-10 PROCEDURE — 80069 RENAL FUNCTION PANEL: CPT | Mod: 91 | Performed by: NURSE PRACTITIONER

## 2024-01-10 PROCEDURE — 83735 ASSAY OF MAGNESIUM: CPT | Mod: 91 | Performed by: NURSE PRACTITIONER

## 2024-01-10 PROCEDURE — 94664 DEMO&/EVAL PT USE INHALER: CPT

## 2024-01-10 PROCEDURE — 94799 UNLISTED PULMONARY SVC/PX: CPT | Mod: XB

## 2024-01-10 PROCEDURE — 83735 ASSAY OF MAGNESIUM: CPT | Mod: 91 | Performed by: SURGERY

## 2024-01-10 PROCEDURE — 99232 SBSQ HOSP IP/OBS MODERATE 35: CPT | Mod: ,,, | Performed by: NURSE PRACTITIONER

## 2024-01-10 PROCEDURE — 80053 COMPREHEN METABOLIC PANEL: CPT | Performed by: SURGERY

## 2024-01-10 PROCEDURE — 63600175 PHARM REV CODE 636 W HCPCS

## 2024-01-10 PROCEDURE — 25500020 PHARM REV CODE 255

## 2024-01-10 PROCEDURE — 85610 PROTHROMBIN TIME: CPT

## 2024-01-10 PROCEDURE — 80069 RENAL FUNCTION PANEL: CPT | Mod: 91 | Performed by: SURGERY

## 2024-01-10 PROCEDURE — 99233 SBSQ HOSP IP/OBS HIGH 50: CPT | Mod: ,,, | Performed by: INTERNAL MEDICINE

## 2024-01-10 PROCEDURE — 99291 CRITICAL CARE FIRST HOUR: CPT | Mod: ,,, | Performed by: ANESTHESIOLOGY

## 2024-01-10 PROCEDURE — 85025 COMPLETE CBC W/AUTO DIFF WBC: CPT | Performed by: SURGERY

## 2024-01-10 PROCEDURE — 94761 N-INVAS EAR/PLS OXIMETRY MLT: CPT

## 2024-01-10 PROCEDURE — C9113 INJ PANTOPRAZOLE SODIUM, VIA: HCPCS | Performed by: SURGERY

## 2024-01-10 PROCEDURE — 63600175 PHARM REV CODE 636 W HCPCS: Performed by: ANESTHESIOLOGY

## 2024-01-10 RX ORDER — HYDRALAZINE HYDROCHLORIDE 20 MG/ML
10 INJECTION INTRAMUSCULAR; INTRAVENOUS EVERY 6 HOURS PRN
Status: DISCONTINUED | OUTPATIENT
Start: 2024-01-10 | End: 2024-02-06 | Stop reason: HOSPADM

## 2024-01-10 RX ORDER — HYDRALAZINE HYDROCHLORIDE 20 MG/ML
INJECTION INTRAMUSCULAR; INTRAVENOUS
Status: COMPLETED
Start: 2024-01-10 | End: 2024-01-10

## 2024-01-10 RX ORDER — HALOPERIDOL 5 MG/ML
1 INJECTION INTRAMUSCULAR ONCE AS NEEDED
Status: COMPLETED | OUTPATIENT
Start: 2024-01-10 | End: 2024-01-10

## 2024-01-10 RX ORDER — LORAZEPAM 2 MG/ML
0.5 INJECTION INTRAMUSCULAR ONCE AS NEEDED
Status: DISCONTINUED | OUTPATIENT
Start: 2024-01-10 | End: 2024-01-10

## 2024-01-10 RX ORDER — METOPROLOL TARTRATE 1 MG/ML
10 INJECTION, SOLUTION INTRAVENOUS ONCE
Status: COMPLETED | OUTPATIENT
Start: 2024-01-10 | End: 2024-01-10

## 2024-01-10 RX ORDER — SODIUM CHLORIDE 9 MG/ML
INJECTION, SOLUTION INTRAVENOUS ONCE
Status: COMPLETED | OUTPATIENT
Start: 2024-01-10 | End: 2024-01-11

## 2024-01-10 RX ORDER — HEPARIN SODIUM,PORCINE/D5W 25000/250
0-40 INTRAVENOUS SOLUTION INTRAVENOUS CONTINUOUS
Status: DISCONTINUED | OUTPATIENT
Start: 2024-01-10 | End: 2024-01-12

## 2024-01-10 RX ORDER — METOPROLOL TARTRATE 1 MG/ML
10 INJECTION, SOLUTION INTRAVENOUS EVERY 4 HOURS
Status: DISCONTINUED | OUTPATIENT
Start: 2024-01-10 | End: 2024-01-11

## 2024-01-10 RX ORDER — HYDRALAZINE HYDROCHLORIDE 20 MG/ML
10 INJECTION INTRAMUSCULAR; INTRAVENOUS EVERY 6 HOURS PRN
Status: DISCONTINUED | OUTPATIENT
Start: 2024-01-10 | End: 2024-01-10

## 2024-01-10 RX ADMIN — MUPIROCIN: 20 OINTMENT TOPICAL at 08:01

## 2024-01-10 RX ADMIN — AMIODARONE HYDROCHLORIDE 1 MG/MIN: 1.8 INJECTION, SOLUTION INTRAVENOUS at 10:01

## 2024-01-10 RX ADMIN — LIDOCAINE 5% 1 PATCH: 700 PATCH TOPICAL at 09:01

## 2024-01-10 RX ADMIN — METOROPROLOL TARTRATE 10 MG: 5 INJECTION, SOLUTION INTRAVENOUS at 10:01

## 2024-01-10 RX ADMIN — METOROPROLOL TARTRATE 10 MG: 5 INJECTION, SOLUTION INTRAVENOUS at 11:01

## 2024-01-10 RX ADMIN — HEPARIN SODIUM 12 UNITS/KG/HR: 10000 INJECTION, SOLUTION INTRAVENOUS at 08:01

## 2024-01-10 RX ADMIN — METOROPROLOL TARTRATE 10 MG: 5 INJECTION, SOLUTION INTRAVENOUS at 02:01

## 2024-01-10 RX ADMIN — HYDRALAZINE HYDROCHLORIDE 10 MG: 20 INJECTION INTRAMUSCULAR; INTRAVENOUS at 11:01

## 2024-01-10 RX ADMIN — PANTOPRAZOLE SODIUM 40 MG: 40 INJECTION, POWDER, FOR SOLUTION INTRAVENOUS at 08:01

## 2024-01-10 RX ADMIN — METOROPROLOL TARTRATE 10 MG: 5 INJECTION, SOLUTION INTRAVENOUS at 05:01

## 2024-01-10 RX ADMIN — AMIODARONE HYDROCHLORIDE 1 MG/MIN: 1.8 INJECTION, SOLUTION INTRAVENOUS at 05:01

## 2024-01-10 RX ADMIN — AMIODARONE HYDROCHLORIDE 1 MG/MIN: 1.8 INJECTION, SOLUTION INTRAVENOUS at 04:01

## 2024-01-10 RX ADMIN — INSULIN ASPART 1 UNITS: 100 INJECTION, SOLUTION INTRAVENOUS; SUBCUTANEOUS at 08:01

## 2024-01-10 RX ADMIN — AMIODARONE HYDROCHLORIDE 0.5 MG/MIN: 1.8 INJECTION, SOLUTION INTRAVENOUS at 10:01

## 2024-01-10 RX ADMIN — ERYTHROPOIETIN 4000 UNITS: 4000 INJECTION, SOLUTION INTRAVENOUS; SUBCUTANEOUS at 10:01

## 2024-01-10 RX ADMIN — HYDRALAZINE HYDROCHLORIDE 10 MG: 20 INJECTION, SOLUTION INTRAMUSCULAR; INTRAVENOUS at 11:01

## 2024-01-10 RX ADMIN — DIATRIZOATE MEGLUMINE AND DIATRIZOATE SODIUM 60 ML: 660; 100 LIQUID ORAL; RECTAL at 10:01

## 2024-01-10 RX ADMIN — HALOPERIDOL LACTATE 1 MG: 5 INJECTION, SOLUTION INTRAMUSCULAR at 01:01

## 2024-01-10 RX ADMIN — HYDRALAZINE HYDROCHLORIDE 10 MG: 20 INJECTION, SOLUTION INTRAMUSCULAR; INTRAVENOUS at 12:01

## 2024-01-10 NOTE — ASSESSMENT & PLAN NOTE
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 On HD ~ 1 year. Last HD prior to presentation 1/1/24. Pt has NELY AVF placed on 10/23 that has not been cleared by vascular. Currently using RIJ TDC.     Nephrology History  iHD Schedule: TTS   Unit/MD: [facility]/ Dr. Vyas  Duration: 3.5 hours   EDW: 58 kg   Access: RIJ TDC  Residual Renal Function: minimal     Assessment:     -Plan for HD today, UF goal 0.5L.   -Epo with HD  - Renal diet, if not NPO   - Continue to monitor intake and output, daily weights   - Avoid nephrotoxic medication and renal dose medications to GFR
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
## 2024-01-10 NOTE — ASSESSMENT & PLAN NOTE
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 Stepped up to the ICU 1/5 with afib RVR refractory to IV metoprolol, started on amio gtt with conversion.      - CT removed 1/8  - Ngt placed 1/8 with alrge volume output; CT abd/pelvis 1/8 with SBO. NGT output decreased, will perform contrast challenge today with 8 hour KUB.  - Cards consult for refractory afib RVR, s/p chemical cardioversion. Return of afib RVR 1/7-1/8 and briefly again yesterday evening, restarted on amio gtt. Agree with amio and heparin gtt's until able to take PO. Currently sinus rhythm.  Appreciate cards assistance.   Heparin gtt while NPO; convert to PO following ROBF   Amio gtt w/ metop IV  Echo 1/6 with normal function  - NPO  - Nephrology following, appreciate assistance.   - Q8H RFP while NPO; daily CMP, CBC   - Appreciate SICU assistance

## 2024-01-10 NOTE — SUBJECTIVE & OBJECTIVE
Interval History: Afib again yesterday evening, back on amio gtt. Sinus rhythm this morning. Heparin gtt was also held overnight but since restarted. No abdominal complaints. Passing flatus, BM yesterday.      Medications:  Continuous Infusions:   amiodarone in dextrose 5% 1 mg/min (01/10/24 0900)    heparin (porcine) in D5W 12 Units/kg/hr (01/10/24 0900)    lactated ringers 50 mL/hr at 01/10/24 0700     Scheduled Meds:   diatrizoate meglumineand-diatrizoate sodium  60 mL Oral Once    epoetin noble (PROCRIT) injection  4,000 Units Intravenous Every Mon, Wed, Fri    LIDOcaine  1 patch Transdermal Q24H    metoprolol  10 mg Intravenous Q6H    pantoprazole  40 mg Intravenous Daily     PRN Meds:bisacodyL, dextrose 10%, dextrose 10%, glucagon (human recombinant), glucose, glucose, heparin (PORCINE), heparin (PORCINE), insulin aspart U-100, ondansetron     Objective:     Vital Signs (Most Recent):  Temp: 97.8 °F (36.6 °C) (01/10/24 0315)  Pulse: 75 (01/10/24 0751)  Resp: (!) 25 (01/10/24 0751)  BP: (!) 155/77 (01/10/24 0528)  SpO2: 100 % (01/10/24 0751) Vital Signs (24h Range):  Temp:  [97.6 °F (36.4 °C)-98.4 °F (36.9 °C)] 97.8 °F (36.6 °C)  Pulse:  [] 75  Resp:  [9-41] 25  SpO2:  [91 %-100 %] 100 %  BP: (105-155)/(43-77) 155/77  Arterial Line BP: (111-182)/(38-71) 138/53     Weight: 68.5 kg (151 lb 0.2 oz)  Body mass index is 25.13 kg/m².    SpO2: 100 %       Intake/Output - Last 3 Shifts         01/08 0700  01/09 0659 01/09 0700  01/10 0659 01/10 0700  01/11 0659    P.O.       I.V. (mL/kg) 2791.6 (40.8) 1276 (18.6) 205.9 (3)    IV Piggyback 2491.1 237.2 36.9    Total Intake(mL/kg) 5282.7 (77.1) 1513.2 (22.1) 242.8 (3.5)    Drains 4650 250     Other 1506      Stool 0      Chest Tube       Total Output 6156 250     Net -873.3 +1263.2 +242.8           Stool Occurrence 1 x 1 x             Lines/Drains/Airways       Central Venous Catheter Line  Duration                  Hemodialysis Catheter 01/25/23 1501 right internal  jugular 349 days              Drain  Duration                  NG/OG Tube 01/08/24 1303 nasogastric Left nostril 1 day              Arterial Line  Duration             Arterial Line 01/08/24 1324 Right Radial 1 day              Peripheral Intravenous Line  Duration                  Hemodialysis AV Fistula 09/01/23 0800 Left forearm 131 days         Peripheral IV - Single Lumen 01/08/24 1108 22 G Posterior;Right Wrist 1 day         Peripheral IV - Single Lumen 01/08/24 1357 20 G Posterior;Right Forearm 1 day         Peripheral IV - Single Lumen 01/09/24 0542 20 G Anterior;Right Forearm 1 day                     Physical Exam  Vitals reviewed.   Constitutional:       General: She is not in acute distress.     Comments: NGT in place   Cardiovascular:      Rate and Rhythm: Normal rate and regular rhythm.   Pulmonary:      Effort: Pulmonary effort is normal. No respiratory distress.   Abdominal:      General: There is no distension.      Tenderness: There is no abdominal tenderness.   Skin:     General: Skin is warm and dry.   Neurological:      General: No focal deficit present.      Mental Status: She is alert and oriented to person, place, and time.   Psychiatric:         Thought Content: Thought content normal.            Significant Labs:  All pertinent labs from the last 24 hours have been reviewed.    Significant Diagnostics:  I have reviewed all pertinent imaging results/findings within the past 24 hours.   Adult

## 2024-01-10 NOTE — NURSING
Pt currently back in Afib rate between 100-130s. Per Mo FLOREZ, re bolus amio and up gtt to 1mg/min

## 2024-01-10 NOTE — NURSING
Ambulated pt in hallway. Pt became hypertensive with SBP ranging between 200-223. Pt back to room in chair and still hypertensive. 10 mg Metoprolol given. PRN hydralazine order placed.     Pt -62 currently.

## 2024-01-10 NOTE — PROGRESS NOTES
[facility]  Cardiothoracic Surgery  Progress Note    Patient Name: Lily Green  MRN: [id number]  Admission Date: 1/2/2024  Hospital Length of Stay: 8 days  Code Status: Full Code   Attending Physician: Tan Thomson MD   Referring Provider: Tan Thomson MD  Principal Problem:Malignant carcinoid tumor of bronchus      Subjective:     Post-Op Info:  Procedure(s) (LRB):  LYMPHADENECTOMY (Right)  THORACOTOMY (Right)  THORACOTOMY, WITH INITIAL THERAPEUTIC WEDGE RESECTION OF LUNG (Right)   8 Days Post-Op     Interval History: Afib again yesterday evening, back on amio gtt. Sinus rhythm this morning. Heparin gtt was also held overnight but since restarted. No abdominal complaints. Passing flatus, BM yesterday.      Medications:  Continuous Infusions:   amiodarone in dextrose 5% 1 mg/min (01/10/24 0900)    heparin (porcine) in D5W 12 Units/kg/hr (01/10/24 0900)    lactated ringers 50 mL/hr at 01/10/24 0700     Scheduled Meds:   diatrizoate meglumineand-diatrizoate sodium  60 mL Oral Once    epoetin noble (PROCRIT) injection  4,000 Units Intravenous Every Mon, Wed, Fri    LIDOcaine  1 patch Transdermal Q24H    metoprolol  10 mg Intravenous Q6H    pantoprazole  40 mg Intravenous Daily     PRN Meds:bisacodyL, dextrose 10%, dextrose 10%, glucagon (human recombinant), glucose, glucose, heparin (PORCINE), heparin (PORCINE), insulin aspart U-100, ondansetron     Objective:     Vital Signs (Most Recent):  Temp: 97.8 °F (36.6 °C) (01/10/24 0315)  Pulse: 75 (01/10/24 0751)  Resp: (!) 25 (01/10/24 0751)  BP: (!) 155/77 (01/10/24 0528)  SpO2: 100 % (01/10/24 0751) Vital Signs (24h Range):  Temp:  [97.6 °F (36.4 °C)-98.4 °F (36.9 °C)] 97.8 °F (36.6 °C)  Pulse:  [] 75  Resp:  [9-41] 25  SpO2:  [91 %-100 %] 100 %  BP: (105-155)/(43-77) 155/77  Arterial Line BP: (111-182)/(38-71) 138/53     Weight: 68.5 kg (151 lb 0.2 oz)  Body mass index is 25.13 kg/m².    SpO2: 100 %       Intake/Output -  Last 3 Shifts         01/08 0700 01/09 0659 01/09 0700  01/10 0659 01/10 0700  01/11 0659    P.O.       I.V. (mL/kg) 2791.6 (40.8) 1276 (18.6) 205.9 (3)    IV Piggyback 2491.1 237.2 36.9    Total Intake(mL/kg) 5282.7 (77.1) 1513.2 (22.1) 242.8 (3.5)    Drains 4650 250     Other 1506      Stool 0      Chest Tube       Total Output 6156 250     Net -873.3 +1263.2 +242.8           Stool Occurrence 1 x 1 x             Lines/Drains/Airways       Central Venous Catheter Line  Duration                  Hemodialysis Catheter 01/25/23 1501 right internal jugular 349 days              Drain  Duration                  NG/OG Tube 01/08/24 1303 nasogastric Left nostril 1 day              Arterial Line  Duration             Arterial Line 01/08/24 1324 Right Radial 1 day              Peripheral Intravenous Line  Duration                  Hemodialysis AV Fistula 09/01/23 0800 Left forearm 131 days         Peripheral IV - Single Lumen 01/08/24 1108 22 G Posterior;Right Wrist 1 day         Peripheral IV - Single Lumen 01/08/24 1357 20 G Posterior;Right Forearm 1 day         Peripheral IV - Single Lumen 01/09/24 0542 20 G Anterior;Right Forearm 1 day                     Physical Exam  Vitals reviewed.   Constitutional:       General: She is not in acute distress.     Comments: NGT in place   Cardiovascular:      Rate and Rhythm: Normal rate and regular rhythm.   Pulmonary:      Effort: Pulmonary effort is normal. No respiratory distress.   Abdominal:      General: There is no distension.      Tenderness: There is no abdominal tenderness.   Skin:     General: Skin is warm and dry.   Neurological:      General: No focal deficit present.      Mental Status: She is alert and oriented to person, place, and time.   Psychiatric:         Thought Content: Thought content normal.            Significant Labs:  All pertinent labs from the last 24 hours have been reviewed.    Significant Diagnostics:  I have reviewed all pertinent imaging  results/findings within the past 24 hours.  Assessment/Plan:     * Malignant carcinoid tumor of bronchus  74 yo female with ESRD (TTS), DDD, benign essential tremor, meniere's disease, HTN, HLD, DM2, GERD, Celiac's, IBS and 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 Stepped up to the ICU 1/5 with afib RVR refractory to IV metoprolol, started on amio gtt with conversion.      - CT removed 1/8  - Ngt placed 1/8 with alrge volume output; CT abd/pelvis 1/8 with SBO. NGT output decreased, will perform contrast challenge today with 8 hour KUB.  - Cards consult for refractory afib RVR, s/p chemical cardioversion. Return of afib RVR 1/7-1/8 and briefly again yesterday evening, restarted on amio gtt. Agree with amio and heparin gtt's until able to take PO. Currently sinus rhythm.  Appreciate cards assistance.   Heparin gtt while NPO; convert to PO following ROBF   Amio gtt w/ metop IV  Echo 1/6 with normal function  - NPO  - Nephrology following, appreciate assistance.   - Q8H RFP while NPO; daily CMP, CBC   - Appreciate SICU assistance         Mandeep Tripp, DO  Cardiothoracic Surgery  [facility]

## 2024-01-10 NOTE — SUBJECTIVE & OBJECTIVE
Interval History/Significant Events: POD 8. Patient went back into Afib RVR overnight. The amio was rebolused then increased to a rate of 1 mg/min. She reports new cough but non-productive and afebrile. She denies abdominal pain, nausea, or vomiting. Abdominal exam markedly improved since Monday with no distension or tenderness. She had one BM yesterday and has since been passing gas. Improvement in output from NGT to 250 cc over past 24 hours. Gastrografin study planned for today.    Follow-up For: Procedure(s) (LRB):  LYMPHADENECTOMY (Right)  THORACOTOMY (Right)  THORACOTOMY, WITH INITIAL THERAPEUTIC WEDGE RESECTION OF LUNG (Right)    Post-Operative Day: 8 Days Post-Op    Objective:     Vital Signs (Most Recent):  Temp: 97.8 °F (36.6 °C) (01/10/24 0315)  Pulse: 75 (01/10/24 0751)  Resp: (!) 25 (01/10/24 0751)  BP: (!) 155/77 (01/10/24 0528)  SpO2: 100 % (01/10/24 0751) Vital Signs (24h Range):  Temp:  [97.6 °F (36.4 °C)-98.4 °F (36.9 °C)] 97.8 °F (36.6 °C)  Pulse:  [] 75  Resp:  [9-41] 25  SpO2:  [91 %-100 %] 100 %  BP: (105-155)/(43-77) 155/77  Arterial Line BP: (111-182)/(38-71) 138/53     Weight: 68.5 kg (151 lb 0.2 oz)  Body mass index is 25.13 kg/m².      Intake/Output Summary (Last 24 hours) at 1/10/2024 1025  Last data filed at 1/10/2024 0900  Gross per 24 hour   Intake 1547.44 ml   Output 250 ml   Net 1297.44 ml          Physical Exam  Vitals and nursing note reviewed.   Constitutional:       General: She is not in acute distress.     Appearance: Normal appearance. She is normal weight. She is ill-appearing. She is not toxic-appearing or diaphoretic.   HENT:      Head: Normocephalic and atraumatic.      Right Ear: External ear normal.      Left Ear: External ear normal.      Nose: Nose normal.      Comments: NGT in place  Eyes:      General: No scleral icterus.     Extraocular Movements: Extraocular movements intact.   Cardiovascular:      Rate and Rhythm: Normal rate and regular rhythm.      Pulses:  Normal pulses.   Pulmonary:      Effort: Pulmonary effort is normal. No respiratory distress.      Comments: On 3L NC  Abdominal:      General: Abdomen is flat. There is no distension.      Palpations: Abdomen is soft.      Tenderness: There is no abdominal tenderness. There is no guarding.      Comments: Abdomen softer than prior. NG decompression continued.    Musculoskeletal:         General: Tenderness (R chest) present.      Cervical back: Normal range of motion and neck supple.      Comments: Right shoulder tenderness. Pt states that she has a trigger point.    Skin:     General: Skin is warm and dry.   Neurological:      General: No focal deficit present.      Mental Status: She is alert and oriented to person, place, and time. Mental status is at baseline.            Vents:  Oxygen Concentration (%): 28 (01/08/24 2344)    Lines/Drains/Airways       Central Venous Catheter Line  Duration                  Hemodialysis Catheter 01/25/23 1501 right internal jugular 349 days              Drain  Duration                  NG/OG Tube 01/08/24 1303 nasogastric Left nostril 1 day              Arterial Line  Duration             Arterial Line 01/08/24 1324 Right Radial 1 day              Peripheral Intravenous Line  Duration                  Hemodialysis AV Fistula 09/01/23 0800 Left forearm 131 days         Peripheral IV - Single Lumen 01/08/24 1108 22 G Posterior;Right Wrist 1 day         Peripheral IV - Single Lumen 01/08/24 1357 20 G Posterior;Right Forearm 1 day         Peripheral IV - Single Lumen 01/09/24 0542 20 G Anterior;Right Forearm 1 day                    Significant Labs:    CBC/Anemia Profile:  Recent Labs   Lab 01/08/24  1646 01/09/24  0351 01/10/24  0317   WBC 7.59 6.93  6.93 10.08   HGB 7.0* 8.4*  8.4* 8.5*   HCT 20.0* 24.3*  24.3* 25.8*    221  221 238   MCV 86 82  82 87   RDW 15.1* 17.2*  17.2* 18.9*        Chemistries:  Recent Labs   Lab 01/08/24  1310 01/08/24  1646 01/09/24  0351  01/09/24  0802 01/10/24  0012 01/10/24  0317 01/10/24  0812   *  135*   < > 141   < > 140 139 137   K 4.8  4.9   < > 4.6   < > 4.9 4.9 4.7   CL 81*  83*   < > 104   < > 109 108 104   CO2 26  26   < > 23   < > 17* 16* 17*   BUN 75*  74*   < > 15   < > 31* 33* 35*   CREATININE 7.2*  7.6*   < > 2.2*   < > 3.3* 3.4* 3.7*   CALCIUM 8.6*  8.7   < > 8.6*   < > 8.3* 8.4* 8.6*   ALBUMIN 2.1*  2.1*   < > 2.5*   < > 2.3* 2.3* 2.3*   PROT 5.9*  --  6.1  --   --  5.9*  --    BILITOT 3.1*  --  1.6*  --   --  1.0  --    ALKPHOS 514*  --  383*  --   --  295*  --    ALT 46*  --  37  --   --  25  --    *  --  105*  --   --  54*  --    MG 3.1*   < > 2.2  --  2.4 2.5  --    PHOS 10.6*  10.8*   < > 2.6*   < > 3.3 3.4 3.2    < > = values in this interval not displayed.       All pertinent labs within the past 24 hours have been reviewed.    Significant Imaging:  I have reviewed all pertinent imaging results/findings within the past 24 hours.

## 2024-01-10 NOTE — PROGRESS NOTES
[facility]  Critical Care - Surgery  Progress Note    Patient Name: Lily Green  MRN: [id number]  Admission Date: 1/2/2024  Hospital Length of Stay: 8 days  Code Status: Full Code  Attending Provider: Tan Thomson MD  Primary Care Provider: Pavel Calixto MD   Principal Problem: Malignant carcinoid tumor of bronchus    Subjective:     Hospital/ICU Course:  No notes on file    Interval History/Significant Events: POD 8. Patient went back into Afib RVR overnight. The amio was rebolused then increased to a rate of 1 mg/min. She reports new cough but non-productive and afebrile. She denies abdominal pain, nausea, or vomiting. Abdominal exam markedly improved since Monday with no distension or tenderness. She had one BM yesterday and has since been passing gas. Improvement in output from NGT to 250 cc over past 24 hours. Gastrografin study planned for today.    Follow-up For: Procedure(s) (LRB):  LYMPHADENECTOMY (Right)  THORACOTOMY (Right)  THORACOTOMY, WITH INITIAL THERAPEUTIC WEDGE RESECTION OF LUNG (Right)    Post-Operative Day: 8 Days Post-Op    Objective:     Vital Signs (Most Recent):  Temp: 97.8 °F (36.6 °C) (01/10/24 0315)  Pulse: 75 (01/10/24 0751)  Resp: (!) 25 (01/10/24 0751)  BP: (!) 155/77 (01/10/24 0528)  SpO2: 100 % (01/10/24 0751) Vital Signs (24h Range):  Temp:  [97.6 °F (36.4 °C)-98.4 °F (36.9 °C)] 97.8 °F (36.6 °C)  Pulse:  [] 75  Resp:  [9-41] 25  SpO2:  [91 %-100 %] 100 %  BP: (105-155)/(43-77) 155/77  Arterial Line BP: (111-182)/(38-71) 138/53     Weight: 68.5 kg (151 lb 0.2 oz)  Body mass index is 25.13 kg/m².      Intake/Output Summary (Last 24 hours) at 1/10/2024 1025  Last data filed at 1/10/2024 0900  Gross per 24 hour   Intake 1547.44 ml   Output 250 ml   Net 1297.44 ml          Physical Exam  Vitals and nursing note reviewed.   Constitutional:       General: She is not in acute distress.     Appearance: Normal appearance. She is normal  weight. She is ill-appearing. She is not toxic-appearing or diaphoretic.   HENT:      Head: Normocephalic and atraumatic.      Right Ear: External ear normal.      Left Ear: External ear normal.      Nose: Nose normal.      Comments: NGT in place  Eyes:      General: No scleral icterus.     Extraocular Movements: Extraocular movements intact.   Cardiovascular:      Rate and Rhythm: Normal rate and regular rhythm.      Pulses: Normal pulses.   Pulmonary:      Effort: Pulmonary effort is normal. No respiratory distress.      Comments: On 3L NC  Abdominal:      General: Abdomen is flat. There is no distension.      Palpations: Abdomen is soft.      Tenderness: There is no abdominal tenderness. There is no guarding.      Comments: Abdomen softer than prior. NG decompression continued.    Musculoskeletal:         General: Tenderness (R chest) present.      Cervical back: Normal range of motion and neck supple.      Comments: Right shoulder tenderness. Pt states that she has a trigger point.    Skin:     General: Skin is warm and dry.      Comments: A-line at R wrist. RIJ TDC.  Neurological:      General: No focal deficit present.      Mental Status: She is alert and oriented to person, place, and time. Mental status is at baseline.            Vents:  Oxygen Concentration (%): 28 (01/08/24 2344)    Lines/Drains/Airways       Central Venous Catheter Line  Duration                  Hemodialysis Catheter 01/25/23 1501 right internal jugular 349 days              Drain  Duration                  NG/OG Tube 01/08/24 1303 nasogastric Left nostril 1 day              Arterial Line  Duration             Arterial Line 01/08/24 1324 Right Radial 1 day              Peripheral Intravenous Line  Duration                  Hemodialysis AV Fistula 09/01/23 0800 Left forearm 131 days         Peripheral IV - Single Lumen 01/08/24 1108 22 G Posterior;Right Wrist 1 day         Peripheral IV - Single Lumen 01/08/24 1357 20 G Posterior;Right  Forearm 1 day         Peripheral IV - Single Lumen 01/09/24 0542 20 G Anterior;Right Forearm 1 day                    Significant Labs:    CBC/Anemia Profile:  Recent Labs   Lab 01/08/24  1646 01/09/24  0351 01/10/24  0317   WBC 7.59 6.93  6.93 10.08   HGB 7.0* 8.4*  8.4* 8.5*   HCT 20.0* 24.3*  24.3* 25.8*    221  221 238   MCV 86 82  82 87   RDW 15.1* 17.2*  17.2* 18.9*        Chemistries:  Recent Labs   Lab 01/08/24  1310 01/08/24  1646 01/09/24  0351 01/09/24  0802 01/10/24  0012 01/10/24  0317 01/10/24  0812   *  135*   < > 141   < > 140 139 137   K 4.8  4.9   < > 4.6   < > 4.9 4.9 4.7   CL 81*  83*   < > 104   < > 109 108 104   CO2 26  26   < > 23   < > 17* 16* 17*   BUN 75*  74*   < > 15   < > 31* 33* 35*   CREATININE 7.2*  7.6*   < > 2.2*   < > 3.3* 3.4* 3.7*   CALCIUM 8.6*  8.7   < > 8.6*   < > 8.3* 8.4* 8.6*   ALBUMIN 2.1*  2.1*   < > 2.5*   < > 2.3* 2.3* 2.3*   PROT 5.9*  --  6.1  --   --  5.9*  --    BILITOT 3.1*  --  1.6*  --   --  1.0  --    ALKPHOS 514*  --  383*  --   --  295*  --    ALT 46*  --  37  --   --  25  --    *  --  105*  --   --  54*  --    MG 3.1*   < > 2.2  --  2.4 2.5  --    PHOS 10.6*  10.8*   < > 2.6*   < > 3.3 3.4 3.2    < > = values in this interval not displayed.       All pertinent labs within the past 24 hours have been reviewed.    Significant Imaging:  I have reviewed all pertinent imaging results/findings within the past 24 hours.  Assessment/Plan:     Cardiac/Vascular  Atrial fibrillation with rapid ventricular response  74 yo female with 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 Stepped up to the unit for persistent afib rvr.       Neuro/Psych:   -- Sedation: none  -- Pain: holding all oral medications including tylenol, robaxin, lyrica   - restart once SBO resolved and able to take PO             Cards:   -- Increasingly hypertensive throughout morning, will plan  for HD today  -- BP goals SBP >90, MAPs >55   - Phenylephrine infusion started on 1/8/24 for support.     - Weaned off on 1/8/24 @ 2100  --  stepped up for AFib RVR: back into A-fib RVR on 1/9 while on 0.5 of amio gtt -> rebolused and increased gtt to 1 -> converted into NSR   - cardiology consulted, appreciate assistance   - decrease amio gtt to 0.5    - transition to PO Amio once SBO resolved and able to take PO  - Metoprolol 5mg IV q6h   - transition to PO lopressor 25mg BID once SBO resolved and able to take PO   - heparin low intensity w/ nomogram    - transition to PO eliquis 5mg BID once SBO resolved and able to take PO      Pulm:   -- Goal O2 sat > 90%  -- transitioned from 3L to RA  -- Chest tube placed d/c'd 1/8/24  -- monitor new cough      Renal:  -- history of ESRD, dialysis dependent.  Monday Wednesday Friday schedule while in the hospital.  -- trend BUN/Cr  -- last SLED on 1/8, will get full regular HD today  -- patient is anuric  -- permacath in place last exchanged 10/2023  -- fistula unable to be used  Recent Labs   Lab 01/10/24  0012 01/10/24  0317 01/10/24  0812   BUN 31* 33* 35*   CREATININE 3.3* 3.4* 3.7*           FEN / GI:   -- CT on 1/8/24 demonstrating SBO  -- US on 1/8/24 showing distended gallbladder without signs of acute cholecystitis   - NPO  - NGT to LIWS  - f/u Gastrografin study   - put out 4.5L since placement   - BM yesterday, holding bowel regiment while NPO  -- Discontinue LR 50 cc/hr  - Chloride normalized with fluid replacement  -- Replace lytes as needed  -- Nutrition:  NPO 2/2 SBO, Dietician consult placed for complex dietary restriction      ID:   -- Tm: afebrile; WBC within normal limits  Recent Labs   Lab 01/08/24  1646 01/09/24  0351 01/10/24  0317   WBC 7.59 6.93  6.93 10.08       -- Abx none      Heme/Onc:  -- Daily CBC  -- PT/IN 5.1 yesterday   - s/p vitamin K repletion with subsequent normalization to 1.2  -- on heparin gtt for A-fib  Recent Labs   Lab  01/08/24  1646 01/08/24  2106 01/09/24  0351 01/09/24  0802 01/09/24  1650 01/10/24  0317 01/10/24  0812   HGB 7.0*  --  8.4*  8.4*  --   --  8.5*  --      --  221  221  --   --  238  --    APTT  --  51.9* 60.9*  --   --   --  30.2   INR  --   --   --  5.1* 2.2* 1.2  --            Endo:   -- history of diabetes  -- Gluc goal 140-180  -- SSI      PPx:   Feeding:  NPO  Analgesia/Sedation:  Pain is controlled, no sedation  Thromboembolic prevention: heparin infusion  HOB >30: yes  Stress Ulcer ppx: pantoprazole IV  Glucose control: Critical care goal 140-180 g/dl, SSI  Bowel reg: n/a SBO. NGT decompression  Invasive Lines/Drains/Airway: Permacath, PIV x2, NGT  Deescalation: Amio transition to PO. Possible step down        Dispo/Code Status/Palliative:   -- SICU/ Full Code       Gissel Jack MD  Critical Care - Surgery  [facility]

## 2024-01-10 NOTE — PROGRESS NOTES
[facility]  Endocrinology  Progress Note    Admit Date: 1/2/2024     Reason for Consult: Management of T2DM, Hyperglycemia     Surgical Procedure and Date: 1/2/24--   LYMPHADENECTOMY (Right)  THORACOTOMY (Right)  THORACOTOMY, WITH INITIAL THERAPEUTIC WEDGE RESECTION OF LUNG (Right)    Diabetes diagnosis year: in her 50s    Home Diabetes Medications:    LDC SSI   Blood sugar 150 to 200, + 1 unit  Blood sugar 201 to 250, + 2 units  Blood sugar 251 to 300, + 3 units  Blood sugar 301 to 350, + 4 units   Blood sugar greater than 350, + 5 units    How often checking glucose at home? >4 x day   BG readings on regimen: 100-120s  Hypoglycemia on the regimen?  denies   Missed doses on regimen?  No    Diabetes Complications include:     Nephropathy     Complicating diabetes co morbidities:   ESRD and Glucocorticoid use       HPI:   Patient is a 73 y.o. female with DDD, benign essential tremor, meniere's disease, HTN, HLD, DM2, GERD, Celiac's, IBS who is found to have RML Carcinoid tumor.  Chest CT 10/18/23 revealed 1.2 x 1.1 cm right middle lobe nodule, previously 1.2 x 1.0 cm in July 2023. Underwent Robotic Bronchoscopy 10/20/23; path: RML nodule positive for well-differentiated neuroendocrine neoplasm, carcinoid tumor. PET/CT Cu64 11/17/23 showed known RML nodule with hypermetabolic activity. Now presents s/p thoracotomy with wedge resection of right lung. Endocrine consulted for BG management.         Interval HPI:   Overnight events: No acute events overnight. Patient in room 95529 CVICU/22376 CVICU A. Blood glucose stable. BG at goal on current insulin regimen (SSI ). Steroid use- None. 8 Days Post-Op  Renal function- Abnormal - Creatinine 3.4  Vasopressors-  None       Endocrine will continue to follow and manage insulin orders inpatient.         Diet NPO     Eating:   NPO  Nausea: No  Hypoglycemia and intervention: No  Fever: No  TPN and/or TF: No      BP (!) 155/77   Pulse 75   Temp 97.8 °F  "(36.6 °C) (Oral)   Resp (!) 25   Ht 5' 5" (1.651 m)   Wt 68.5 kg (151 lb 0.2 oz)   LMP  (LMP Unknown) Comment: BEAU/ NO BSO  1986  SpO2 100%   Breastfeeding No   BMI 25.13 kg/m²     Labs Reviewed and Include    Recent Labs   Lab 01/10/24  0317 01/10/24  0812   * 143*   CALCIUM 8.4* 8.6*   ALBUMIN 2.3* 2.3*   PROT 5.9*  --     137   K 4.9 4.7   CO2 16* 17*    104   BUN 33* 35*   CREATININE 3.4* 3.7*   ALKPHOS 295*  --    ALT 25  --    AST 54*  --    BILITOT 1.0  --      Lab Results   Component Value Date    WBC 10.08 01/10/2024    HGB 8.5 (L) 01/10/2024    HCT 25.8 (L) 01/10/2024    MCV 87 01/10/2024     01/10/2024     No results for input(s): "TSH", "FREET4" in the last 168 hours.  Lab Results   Component Value Date    HGBA1C 4.9 11/15/2023       Nutritional status:   Body mass index is 25.13 kg/m².  Lab Results   Component Value Date    ALBUMIN 2.3 (L) 01/10/2024    ALBUMIN 2.3 (L) 01/10/2024    ALBUMIN 2.3 (L) 01/10/2024     No results found for: "PREALBUMIN"    Estimated Creatinine Clearance: 13.2 mL/min (A) (based on SCr of 3.7 mg/dL (H)).    Accu-Checks  Recent Labs     01/08/24  0609 01/08/24  1310 01/08/24  1718 01/08/24  1908 01/08/24  2358 01/09/24  0351 01/09/24  0804 01/09/24  1115 01/09/24  2002 01/10/24  0814   POCTGLUCOSE 234* 272* 223* 174* 116* 115* 147* 149* 152* 150*       Current Medications and/or Treatments Impacting Glycemic Control  Immunotherapy:    Immunosuppressants       None          Steroids:   Hormones (From admission, onward)      None          Pressors:    Autonomic Drugs (From admission, onward)      None          Hyperglycemia/Diabetes Medications:   Antihyperglycemics (From admission, onward)      Start     Stop Route Frequency Ordered    01/02/24 1338  insulin aspart U-100 pen 0-5 Units         -- SubQ Every 4 hours PRN 01/02/24 1238            ASSESSMENT and PLAN    Renal/  ESRD (end stage renal disease)    Titrate insulin slowly to avoid " hypoglycemia as the risk of hypoglycemia increases with decreased creatinine clearance.      Oncology  * Malignant carcinoid tumor of bronchus  Managed per primary team        Endocrine  Type 2 diabetes mellitus with diabetic polyneuropathy, without long-term current use of insulin  BG goal: 140-180    - Novolog Low dose correction with ISF 50 starting at 150 prn ac/hs   - POCT Glucose q4hr  - Hypoglycemia protocol in place      ** Please notify Endocrine for any change and/or advance in diet**  ** Please call Endocrine for any BG related issues **     Discharge Planning:   TBD. Please notify endocrinology prior to discharge.             Pranav Perry, DNP, FNP  Endocrinology  [facility]

## 2024-01-10 NOTE — PROGRESS NOTES
[facility]  Nephrology  Progress Note    Patient Name: Lily Green  MRN: [id number]  Admission Date: 1/2/2024  Hospital Length of Stay: 8 days  Attending Provider: Tan Thomson MD   Primary Care Physician: Pavel Calixto MD  Principal Problem:Malignant carcinoid tumor of bronchus    Subjective:     Interval History: RRT held yesterday. Afib overnight, requiring bolus and increase in amio gtt. BP 140s-170s/60s. On 3L NC. Net +1.2L.     Review of patient's allergies indicates:   Allergen Reactions    Crestor [rosuvastatin] Swelling    Gluten protein      Current Facility-Administered Medications   Medication Frequency    0.9%  NaCl infusion Once    amiodarone 360 mg/200 mL (1.8 mg/mL) infusion Continuous    bisacodyL suppository 10 mg Daily PRN    dextrose 10% bolus 125 mL 125 mL PRN    dextrose 10% bolus 250 mL 250 mL PRN    epoetin noble injection 4,000 Units Every Mon, Wed, Fri    glucagon (human recombinant) injection 1 mg PRN    glucose chewable tablet 16 g PRN    glucose chewable tablet 24 g PRN    heparin 25,000 units in dextrose 5% (100 units/ml) IV bolus from bag - ADDITIONAL PRN BOLUS - 30 units/kg PRN    heparin 25,000 units in dextrose 5% (100 units/ml) IV bolus from bag - ADDITIONAL PRN BOLUS - 60 units/kg PRN    heparin 25,000 units in dextrose 5% 250 mL (100 units/mL) infusion LOW INTENSITY nomogram - [facility] Continuous    hydrALAZINE injection 10 mg Q6H PRN    insulin aspart U-100 pen 0-5 Units Q4H PRN    LIDOcaine 5 % patch 1 patch Q24H    metoprolol injection 10 mg Q6H    ondansetron injection 8 mg Q8H PRN    pantoprazole injection 40 mg Daily       Objective:     Vital Signs (Most Recent):  Temp: 97.8 °F (36.6 °C) (01/10/24 0315)  Pulse: 75 (01/10/24 0751)  Resp: (!) 25 (01/10/24 0751)  BP: (!) 169/61 (01/10/24 1133)  SpO2: 100 % (01/10/24 0751) Vital Signs (24h Range):  Temp:  [97.6 °F (36.4 °C)-98.4 °F (36.9 °C)] 97.8 °F (36.6 °C)  Pulse:  [] 75  Resp:   [9-41] 25  SpO2:  [91 %-100 %] 100 %  BP: (105-208)/(45-99) 169/61  Arterial Line BP: (111-182)/(38-71) 138/53     Weight: 68.5 kg (151 lb 0.2 oz) (01/07/24 0600)  Body mass index is 25.13 kg/m².  Body surface area is 1.77 meters squared.    I/O last 3 completed shifts:  In: 5119.1 [I.V.:3794.3; IV Piggyback:1324.7]  Out: 3406 [Drains:1900; Other:1506]     Physical Exam  Vitals and nursing note reviewed.   Constitutional:       Appearance: Normal appearance.   HENT:      Head: Normocephalic and atraumatic.      Nose:      Comments: NGT with dark feculent output, slightly thinner than yesterday   Cardiovascular:      Rate and Rhythm: Normal rate and regular rhythm.      Comments: Right radial a line   Pulmonary:      Effort: Pulmonary effort is normal. No respiratory distress.      Comments: Right posterior chest incision c/d/I    Abdominal:      General: Abdomen is flat. There is no distension.      Palpations: Abdomen is soft. There is no mass.      Tenderness: There is no abdominal tenderness.      Hernia: No hernia is present.      Comments: Soft, nondistended  Nontender    Musculoskeletal:      Right lower leg: No edema.      Left lower leg: No edema.   Skin:     General: Skin is warm and dry.   Neurological:      General: No focal deficit present.      Mental Status: She is alert and oriented to person, place, and time.   Psychiatric:         Mood and Affect: Mood normal.         Behavior: Behavior normal.          Significant Labs:  CBC:   Recent Labs   Lab 01/10/24  0317   WBC 10.08   RBC 2.98*   HGB 8.5*   HCT 25.8*      MCV 87   MCH 28.5   MCHC 32.9     CMP:   Recent Labs   Lab 01/10/24  0317 01/10/24  0812   * 143*   CALCIUM 8.4* 8.6*   ALBUMIN 2.3* 2.3*   PROT 5.9*  --     137   K 4.9 4.7   CO2 16* 17*    104   BUN 33* 35*   CREATININE 3.4* 3.7*   ALKPHOS 295*  --    ALT 25  --    AST 54*  --    BILITOT 1.0  --      All labs within the past 24 hours have been reviewed.      Assessment/Plan:     Renal/  ESRD (end stage renal disease)  S/p R thoracotomy due to RML carcinoid tumor. On HD ~ 1 year. Last HD prior to presentation 1/1/24. Pt has NELY AVF placed on 10/23 that has not been cleared by vascular. Currently using RIJ TDC.     Nephrology History  iHD Schedule: TTS   Unit/MD: [facility]/ Dr. Vyas  Duration: 3.5 hours   EDW: 58 kg   Access: RIJ TDC  Residual Renal Function: minimal     Assessment:     -Plan for HD today, UF goal 0.5L.   -Epo with HD  - Renal diet, if not NPO   - Continue to monitor intake and output, daily weights   - Avoid nephrotoxic medication and renal dose medications to GFR        Thank you for your consult. I will follow-up with patient. Please contact us if you have any additional questions.    Christy Gaytan, BRITTANI  Nephrology  [facility]

## 2024-01-10 NOTE — SUBJECTIVE & OBJECTIVE
"Interval HPI:   Overnight events: No acute events overnight. Patient in room 94625 CVICU/74002 CVICU A. Blood glucose stable. BG at goal on current insulin regimen (SSI ). Steroid use- None. 8 Days Post-Op  Renal function- Abnormal - Creatinine 3.4  Vasopressors-  None       Endocrine will continue to follow and manage insulin orders inpatient.         Diet NPO     Eating:   NPO  Nausea: No  Hypoglycemia and intervention: No  Fever: No  TPN and/or TF: No      BP (!) 155/77   Pulse 75   Temp 97.8 °F (36.6 °C) (Oral)   Resp (!) 25   Ht 5' 5" (1.651 m)   Wt 68.5 kg (151 lb 0.2 oz)   LMP  (LMP Unknown) Comment: BEAU/ NO BSO  1986  SpO2 100%   Breastfeeding No   BMI 25.13 kg/m²     Labs Reviewed and Include    Recent Labs   Lab 01/10/24  0317 01/10/24  0812   * 143*   CALCIUM 8.4* 8.6*   ALBUMIN 2.3* 2.3*   PROT 5.9*  --     137   K 4.9 4.7   CO2 16* 17*    104   BUN 33* 35*   CREATININE 3.4* 3.7*   ALKPHOS 295*  --    ALT 25  --    AST 54*  --    BILITOT 1.0  --      Lab Results   Component Value Date    WBC 10.08 01/10/2024    HGB 8.5 (L) 01/10/2024    HCT 25.8 (L) 01/10/2024    MCV 87 01/10/2024     01/10/2024     No results for input(s): "TSH", "FREET4" in the last 168 hours.  Lab Results   Component Value Date    HGBA1C 4.9 11/15/2023       Nutritional status:   Body mass index is 25.13 kg/m².  Lab Results   Component Value Date    ALBUMIN 2.3 (L) 01/10/2024    ALBUMIN 2.3 (L) 01/10/2024    ALBUMIN 2.3 (L) 01/10/2024     No results found for: "PREALBUMIN"    Estimated Creatinine Clearance: 13.2 mL/min (A) (based on SCr of 3.7 mg/dL (H)).    Accu-Checks  Recent Labs     01/08/24  0609 01/08/24  1310 01/08/24  1718 01/08/24  1908 01/08/24  2358 01/09/24  0351 01/09/24  0804 01/09/24  1115 01/09/24  2002 01/10/24  0814   POCTGLUCOSE 234* 272* 223* 174* 116* 115* 147* 149* 152* 150*       Current Medications and/or Treatments Impacting Glycemic Control  Immunotherapy:  "   Immunosuppressants       None          Steroids:   Hormones (From admission, onward)      None          Pressors:    Autonomic Drugs (From admission, onward)      None          Hyperglycemia/Diabetes Medications:   Antihyperglycemics (From admission, onward)      Start     Stop Route Frequency Ordered    01/02/24 1338  insulin aspart U-100 pen 0-5 Units         -- SubQ Every 4 hours PRN 01/02/24 1238

## 2024-01-10 NOTE — ASSESSMENT & PLAN NOTE
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 Stepped up to the unit for persistent afib rvr.       Neuro/Psych:   -- Sedation: none  -- Pain: holding all oral medications including tylenol, robaxin, lyrica   - restart once SBO resolved and able to take PO             Cards:   -- Increasingly hypertensive throughout morning, will plan for HD today  -- BP goals SBP >90, MAPs >55   - Phenylephrine infusion started on 1/8/24 for support.     - Weaned off on 1/8/24 @ 2100  --  stepped up for AFib RVR: back into A-fib RVR on 1/9 while on 0.5 of amio gtt -> rebolused and increased gtt to 1 -> converted into NSR   - cardiology consulted, appreciate assistance   - decrease amio gtt to 0.5    - transition to PO Amio once SBO resolved and able to take PO  - Metoprolol 5mg IV q6h   - transition to PO lopressor 25mg BID once SBO resolved and able to take PO   - heparin low intensity w/ nomogram    - transition to PO eliquis 5mg BID once SBO resolved and able to take PO      Pulm:   -- Goal O2 sat > 90%  -- transitioned from 3L to RA  -- Chest tube placed d/c'd 1/8/24  -- monitor new cough      Renal:  -- history of ESRD, dialysis dependent.  Monday Wednesday Friday schedule while in the hospital.  -- trend BUN/Cr  -- last SLED on 1/8, will get full regular HD today  -- patient is anuric  -- permacath in place last exchanged 10/2023  -- fistula unable to be used  Recent Labs   Lab 01/10/24  0012 01/10/24  0317 01/10/24  0812   BUN 31* 33* 35*   CREATININE 3.3* 3.4* 3.7*           FEN / GI:   -- CT on 1/8/24 demonstrating SBO  -- US on 1/8/24 showing distended gallbladder without signs of acute cholecystitis   - NPO  - NGT to LIWS  - f/u Gastrografin study   - put out 4.5L since placement   - BM yesterday, holding bowel regiment while NPO  -- Discontinue LR 50 cc/hr  - Chloride normalized with fluid replacement  -- Replace lytes as  needed  -- Nutrition:  NPO 2/2 SBO, Dietician consult placed for complex dietary restriction      ID:   -- Tm: afebrile; WBC within normal limits  Recent Labs   Lab 01/08/24  1646 01/09/24  0351 01/10/24  0317   WBC 7.59 6.93  6.93 10.08       -- Abx none      Heme/Onc:  -- Daily CBC  -- PT/IN 5.1 yesterday   - s/p vitamin K repletion with subsequent normalization to 1.2  -- on heparin gtt for A-fib  Recent Labs   Lab 01/08/24  1646 01/08/24  2106 01/09/24  0351 01/09/24  0802 01/09/24  1650 01/10/24  0317 01/10/24  0812   HGB 7.0*  --  8.4*  8.4*  --   --  8.5*  --      --  221  221  --   --  238  --    APTT  --  51.9* 60.9*  --   --   --  30.2   INR  --   --   --  5.1* 2.2* 1.2  --            Endo:   -- history of diabetes  -- Gluc goal 140-180  -- SSI      PPx:   Feeding:  NPO  Analgesia/Sedation:  Pain is controlled, no sedation  Thromboembolic prevention: heparin infusion  HOB >30: yes  Stress Ulcer ppx: pantoprazole IV  Glucose control: Critical care goal 140-180 g/dl, SSI  Bowel reg: n/a SBO. NGT decompression  Invasive Lines/Drains/Airway: Permacath, PIV x2, NGT  Deescalation: Amio transition to PO. Possible step down        Dispo/Code Status/Palliative:   -- SICU/ Full Code

## 2024-01-10 NOTE — PLAN OF CARE
"      SICU PLAN OF CARE NOTE    Dx: Malignant carcinoid tumor of bronchus    Shift Events: See notes for shift events    Goals of Care: MAP>55, SBP>90    Neuro: AAO x4, Follows Commands, and Moves All Extremities    Cardiac: Afib/ NSR     Vital Signs: BP (!) 155/77   Pulse 81   Temp 97.8 °F (36.6 °C) (Oral)   Resp (!) 30   Ht 5' 5" (1.651 m)   Wt 68.5 kg (151 lb 0.2 oz)   LMP  (LMP Unknown) Comment: BEAU/ NO BSO  1986  SpO2 97%   Breastfeeding No   BMI 25.13 kg/m²     Respiratory: 3L Nasal Cannula    Diet: NPO    Gtts: Amiodarone and MIVF    Urine Output: Anuric    Drains: NG/OG Tube 50 cc /  shift    Labs/Accuchecks: Daily Labs, Renal labs, Accuchecks Q4.    Skin: No new skin breakdown noted.Specialty bed plugged in and working.   Pt shifts weight independently with assisstance provided Q2hr. Foams placed to heels and sacrum for addition breakdown prevention.     PoC reviewed with pt/family. All questions/concerns addressed. See flowsheets for full assessment.          "

## 2024-01-10 NOTE — PLAN OF CARE
Please merge this note with today's resident critical care note.    SICU Staff Addendum  I have reviewed and concur with the resident's/NP's history, physical, assessment, and plan.  I have personally interviewed and examined the patient at bedside.  See below for any additional findings/changes.    Reason for admission:  Malignant carcinoid tumor of bronchus  Present on Admission:   ESRD (end stage renal disease)   Type 2 diabetes mellitus with diabetic polyneuropathy, without long-term current use of insulin   Malignant carcinoid tumor of bronchus      Active issues/Goals for Today:     72 y/o F now POD 8 s/p RML wedge resection for RML carcinoid tumor. Post-op course complicated by AF with RVR requiring ICU admission on 1/5 and ileus.     - Pain is controlled on current regimen. Minimizing opioids in setting of SBO.  - Now in NSR. Cardiology following. Will decrease IV amio. Anticoagulation with heparin drip.  - Wean supplemental oxygen as able.  - SBO. Now with flatus. Conservative management for now with NGT decompression. Gastrograffin challenge today.  - ESRD. IVF stopped today. Plan for intermittent HD today. Discussed with nephrology staff.  - INR has corrected after vitamin K. Resuming heparin this AM.  - PT/OT  - OOB.  - Approaching readiness for step down unit.    40 minutes of critical care time was spent personally by me on the following activities: development of treatment plan with patient or surrogate and bedside caregivers, discussions with consultants, evaluation of patient's response to treatment, examination of patient, ordering and performing treatments and interventions, ordering and review of laboratory studies, ordering and review of radiographic studies, pulse oximetry, re-evaluation of patient's condition.  This critical care time did not overlap with that of any other provider or involve time for any procedures.    Wayne Landon MD  Anesthesiology/Critical Care

## 2024-01-10 NOTE — SUBJECTIVE & OBJECTIVE
Interval History: RRT held yesterday. Afib overnight, requiring bolus and increase in amio gtt. BP 140s-170s/60s. On 3L NC. Net +1.2L.     Review of patient's allergies indicates:   Allergen Reactions    Crestor [rosuvastatin] Swelling    Gluten protein      Current Facility-Administered Medications   Medication Frequency    0.9%  NaCl infusion Once    amiodarone 360 mg/200 mL (1.8 mg/mL) infusion Continuous    bisacodyL suppository 10 mg Daily PRN    dextrose 10% bolus 125 mL 125 mL PRN    dextrose 10% bolus 250 mL 250 mL PRN    epoetin noble injection 4,000 Units Every Mon, Wed, Fri    glucagon (human recombinant) injection 1 mg PRN    glucose chewable tablet 16 g PRN    glucose chewable tablet 24 g PRN    heparin 25,000 units in dextrose 5% (100 units/ml) IV bolus from bag - ADDITIONAL PRN BOLUS - 30 units/kg PRN    heparin 25,000 units in dextrose 5% (100 units/ml) IV bolus from bag - ADDITIONAL PRN BOLUS - 60 units/kg PRN    heparin 25,000 units in dextrose 5% 250 mL (100 units/mL) infusion LOW INTENSITY nomogram - [facility] Continuous    hydrALAZINE injection 10 mg Q6H PRN    insulin aspart U-100 pen 0-5 Units Q4H PRN    LIDOcaine 5 % patch 1 patch Q24H    metoprolol injection 10 mg Q6H    ondansetron injection 8 mg Q8H PRN    pantoprazole injection 40 mg Daily       Objective:     Vital Signs (Most Recent):  Temp: 97.8 °F (36.6 °C) (01/10/24 0315)  Pulse: 75 (01/10/24 0751)  Resp: (!) 25 (01/10/24 0751)  BP: (!) 169/61 (01/10/24 1133)  SpO2: 100 % (01/10/24 0751) Vital Signs (24h Range):  Temp:  [97.6 °F (36.4 °C)-98.4 °F (36.9 °C)] 97.8 °F (36.6 °C)  Pulse:  [] 75  Resp:  [9-41] 25  SpO2:  [91 %-100 %] 100 %  BP: (105-208)/(45-99) 169/61  Arterial Line BP: (111-182)/(38-71) 138/53     Weight: 68.5 kg (151 lb 0.2 oz) (01/07/24 0600)  Body mass index is 25.13 kg/m².  Body surface area is 1.77 meters squared.    I/O last 3 completed shifts:  In: 5119.1 [I.V.:3794.3; IV Piggyback:1324.7]  Out: 3406 [Drains:1900;  Other:1506]     Physical Exam  Vitals and nursing note reviewed.   Constitutional:       Appearance: Normal appearance.   HENT:      Head: Normocephalic and atraumatic.      Nose:      Comments: NGT with dark feculent output, slightly thinner than yesterday   Cardiovascular:      Rate and Rhythm: Normal rate and regular rhythm.      Comments: Right radial a line   Pulmonary:      Effort: Pulmonary effort is normal. No respiratory distress.      Comments: Right posterior chest incision c/d/I    Abdominal:      General: Abdomen is flat. There is no distension.      Palpations: Abdomen is soft. There is no mass.      Tenderness: There is no abdominal tenderness.      Hernia: No hernia is present.      Comments: Soft, nondistended  Nontender    Musculoskeletal:      Right lower leg: No edema.      Left lower leg: No edema.   Skin:     General: Skin is warm and dry.   Neurological:      General: No focal deficit present.      Mental Status: She is alert and oriented to person, place, and time.   Psychiatric:         Mood and Affect: Mood normal.         Behavior: Behavior normal.          Significant Labs:  CBC:   Recent Labs   Lab 01/10/24  0317   WBC 10.08   RBC 2.98*   HGB 8.5*   HCT 25.8*      MCV 87   MCH 28.5   MCHC 32.9     CMP:   Recent Labs   Lab 01/10/24  0317 01/10/24  0812   * 143*   CALCIUM 8.4* 8.6*   ALBUMIN 2.3* 2.3*   PROT 5.9*  --     137   K 4.9 4.7   CO2 16* 17*    104   BUN 33* 35*   CREATININE 3.4* 3.7*   ALKPHOS 295*  --    ALT 25  --    AST 54*  --    BILITOT 1.0  --      All labs within the past 24 hours have been reviewed.

## 2024-01-11 PROBLEM — Z79.01 ANTICOAGULATED ON HEPARIN: Status: ACTIVE | Noted: 2024-01-11

## 2024-01-11 PROBLEM — R34 ANURIA: Status: ACTIVE | Noted: 2024-01-11

## 2024-01-11 PROBLEM — I77.0 A-V FISTULA: Status: ACTIVE | Noted: 2024-01-11

## 2024-01-11 LAB
ALBUMIN SERPL BCP-MCNC: 2.3 G/DL (ref 3.5–5.2)
ALBUMIN SERPL BCP-MCNC: 2.3 G/DL (ref 3.5–5.2)
ALP SERPL-CCNC: 285 U/L (ref 55–135)
ALT SERPL W/O P-5'-P-CCNC: 20 U/L (ref 10–44)
ANION GAP SERPL CALC-SCNC: 14 MMOL/L (ref 8–16)
ANION GAP SERPL CALC-SCNC: 18 MMOL/L (ref 8–16)
ANISOCYTOSIS BLD QL SMEAR: SLIGHT
APTT PPP: 39 SEC (ref 21–32)
AST SERPL-CCNC: 44 U/L (ref 10–40)
BASOPHILS # BLD AUTO: ABNORMAL K/UL (ref 0–0.2)
BASOPHILS NFR BLD: 0 % (ref 0–1.9)
BILIRUB SERPL-MCNC: 0.9 MG/DL (ref 0.1–1)
BUN SERPL-MCNC: 18 MG/DL (ref 8–23)
BUN SERPL-MCNC: 50 MG/DL (ref 8–23)
CALCIUM SERPL-MCNC: 7.9 MG/DL (ref 8.7–10.5)
CALCIUM SERPL-MCNC: 9.1 MG/DL (ref 8.7–10.5)
CHLORIDE SERPL-SCNC: 102 MMOL/L (ref 95–110)
CHLORIDE SERPL-SCNC: 106 MMOL/L (ref 95–110)
CO2 SERPL-SCNC: 14 MMOL/L (ref 23–29)
CO2 SERPL-SCNC: 28 MMOL/L (ref 23–29)
CREAT SERPL-MCNC: 2 MG/DL (ref 0.5–1.4)
CREAT SERPL-MCNC: 4.6 MG/DL (ref 0.5–1.4)
DIFFERENTIAL METHOD BLD: ABNORMAL
EOSINOPHIL # BLD AUTO: ABNORMAL K/UL (ref 0–0.5)
EOSINOPHIL NFR BLD: 0 % (ref 0–8)
ERYTHROCYTE [DISTWIDTH] IN BLOOD BY AUTOMATED COUNT: 18.6 % (ref 11.5–14.5)
EST. GFR  (NO RACE VARIABLE): 25.9 ML/MIN/1.73 M^2
EST. GFR  (NO RACE VARIABLE): 9.5 ML/MIN/1.73 M^2
GLUCOSE SERPL-MCNC: 112 MG/DL (ref 70–110)
GLUCOSE SERPL-MCNC: 189 MG/DL (ref 70–110)
HCT VFR BLD AUTO: 26.2 % (ref 37–48.5)
HGB BLD-MCNC: 9 G/DL (ref 12–16)
HYPOCHROMIA BLD QL SMEAR: ABNORMAL
IMM GRANULOCYTES # BLD AUTO: ABNORMAL K/UL (ref 0–0.04)
IMM GRANULOCYTES NFR BLD AUTO: ABNORMAL % (ref 0–0.5)
INR PPP: 1.2 (ref 0.8–1.2)
LYMPHOCYTES # BLD AUTO: ABNORMAL K/UL (ref 1–4.8)
LYMPHOCYTES NFR BLD: 6 % (ref 18–48)
MAGNESIUM SERPL-MCNC: 2.1 MG/DL (ref 1.6–2.6)
MAGNESIUM SERPL-MCNC: 2.8 MG/DL (ref 1.6–2.6)
MCH RBC QN AUTO: 28.9 PG (ref 27–31)
MCHC RBC AUTO-ENTMCNC: 34.4 G/DL (ref 32–36)
MCV RBC AUTO: 84 FL (ref 82–98)
METAMYELOCYTES NFR BLD MANUAL: 1 %
MONOCYTES # BLD AUTO: ABNORMAL K/UL (ref 0.3–1)
MONOCYTES NFR BLD: 3 % (ref 4–15)
MYELOCYTES NFR BLD MANUAL: 2 %
NEUTROPHILS NFR BLD: 88 % (ref 38–73)
NRBC BLD-RTO: 1 /100 WBC
OVALOCYTES BLD QL SMEAR: ABNORMAL
PHOSPHATE SERPL-MCNC: 2 MG/DL (ref 2.7–4.5)
PHOSPHATE SERPL-MCNC: 4.5 MG/DL (ref 2.7–4.5)
PLATELET # BLD AUTO: 300 K/UL (ref 150–450)
PLATELET BLD QL SMEAR: ABNORMAL
PMV BLD AUTO: 11.2 FL (ref 9.2–12.9)
POCT GLUCOSE: 130 MG/DL (ref 70–110)
POCT GLUCOSE: 160 MG/DL (ref 70–110)
POCT GLUCOSE: 172 MG/DL (ref 70–110)
POCT GLUCOSE: 177 MG/DL (ref 70–110)
POCT GLUCOSE: 210 MG/DL (ref 70–110)
POCT GLUCOSE: 220 MG/DL (ref 70–110)
POIKILOCYTOSIS BLD QL SMEAR: SLIGHT
POLYCHROMASIA BLD QL SMEAR: ABNORMAL
POTASSIUM SERPL-SCNC: 3.4 MMOL/L (ref 3.5–5.1)
POTASSIUM SERPL-SCNC: 5.1 MMOL/L (ref 3.5–5.1)
PROT SERPL-MCNC: 6.3 G/DL (ref 6–8.4)
PROTHROMBIN TIME: 12.4 SEC (ref 9–12.5)
RBC # BLD AUTO: 3.11 M/UL (ref 4–5.4)
SODIUM SERPL-SCNC: 138 MMOL/L (ref 136–145)
SODIUM SERPL-SCNC: 144 MMOL/L (ref 136–145)
TARGETS BLD QL SMEAR: ABNORMAL
WBC # BLD AUTO: 15.24 K/UL (ref 3.9–12.7)

## 2024-01-11 PROCEDURE — 87205 SMEAR GRAM STAIN: CPT

## 2024-01-11 PROCEDURE — 87070 CULTURE OTHR SPECIMN AEROBIC: CPT

## 2024-01-11 PROCEDURE — 11000001 HC ACUTE MED/SURG PRIVATE ROOM

## 2024-01-11 PROCEDURE — 63600175 PHARM REV CODE 636 W HCPCS: Performed by: SURGERY

## 2024-01-11 PROCEDURE — 63600175 PHARM REV CODE 636 W HCPCS

## 2024-01-11 PROCEDURE — 94664 DEMO&/EVAL PT USE INHALER: CPT

## 2024-01-11 PROCEDURE — 25000003 PHARM REV CODE 250: Performed by: NURSE PRACTITIONER

## 2024-01-11 PROCEDURE — 25000003 PHARM REV CODE 250

## 2024-01-11 PROCEDURE — 87186 SC STD MICRODIL/AGAR DIL: CPT | Mod: 59

## 2024-01-11 PROCEDURE — 87040 BLOOD CULTURE FOR BACTERIA: CPT

## 2024-01-11 PROCEDURE — 63600175 PHARM REV CODE 636 W HCPCS: Performed by: STUDENT IN AN ORGANIZED HEALTH CARE EDUCATION/TRAINING PROGRAM

## 2024-01-11 PROCEDURE — 87077 CULTURE AEROBIC IDENTIFY: CPT | Mod: 59

## 2024-01-11 PROCEDURE — 63600175 PHARM REV CODE 636 W HCPCS: Performed by: NURSE PRACTITIONER

## 2024-01-11 PROCEDURE — 90935 HEMODIALYSIS ONE EVALUATION: CPT | Mod: ,,, | Performed by: INTERNAL MEDICINE

## 2024-01-11 PROCEDURE — C9113 INJ PANTOPRAZOLE SODIUM, VIA: HCPCS | Performed by: SURGERY

## 2024-01-11 PROCEDURE — 85007 BL SMEAR W/DIFF WBC COUNT: CPT | Performed by: SURGERY

## 2024-01-11 PROCEDURE — 25000003 PHARM REV CODE 250: Performed by: STUDENT IN AN ORGANIZED HEALTH CARE EDUCATION/TRAINING PROGRAM

## 2024-01-11 PROCEDURE — 84100 ASSAY OF PHOSPHORUS: CPT | Performed by: SURGERY

## 2024-01-11 PROCEDURE — 85730 THROMBOPLASTIN TIME PARTIAL: CPT

## 2024-01-11 PROCEDURE — 99900035 HC TECH TIME PER 15 MIN (STAT)

## 2024-01-11 PROCEDURE — 83735 ASSAY OF MAGNESIUM: CPT | Performed by: SURGERY

## 2024-01-11 PROCEDURE — 94640 AIRWAY INHALATION TREATMENT: CPT

## 2024-01-11 PROCEDURE — 99291 CRITICAL CARE FIRST HOUR: CPT | Mod: ,,, | Performed by: ANESTHESIOLOGY

## 2024-01-11 PROCEDURE — 83735 ASSAY OF MAGNESIUM: CPT | Mod: 91 | Performed by: NURSE PRACTITIONER

## 2024-01-11 PROCEDURE — 97116 GAIT TRAINING THERAPY: CPT

## 2024-01-11 PROCEDURE — 25000242 PHARM REV CODE 250 ALT 637 W/ HCPCS

## 2024-01-11 PROCEDURE — 85027 COMPLETE CBC AUTOMATED: CPT | Performed by: SURGERY

## 2024-01-11 PROCEDURE — 31720 CLEARANCE OF AIRWAYS: CPT

## 2024-01-11 PROCEDURE — 87081 CULTURE SCREEN ONLY: CPT

## 2024-01-11 PROCEDURE — 87154 CUL TYP ID BLD PTHGN 6+ TRGT: CPT

## 2024-01-11 PROCEDURE — 85610 PROTHROMBIN TIME: CPT

## 2024-01-11 PROCEDURE — 27000221 HC OXYGEN, UP TO 24 HOURS

## 2024-01-11 PROCEDURE — 25000003 PHARM REV CODE 250: Performed by: SURGERY

## 2024-01-11 PROCEDURE — 99232 SBSQ HOSP IP/OBS MODERATE 35: CPT | Mod: ,,, | Performed by: NURSE PRACTITIONER

## 2024-01-11 PROCEDURE — 80053 COMPREHEN METABOLIC PANEL: CPT | Performed by: SURGERY

## 2024-01-11 PROCEDURE — 5A12012 PERFORMANCE OF CARDIAC OUTPUT, SINGLE, MANUAL: ICD-10-PCS | Performed by: ANESTHESIOLOGY

## 2024-01-11 PROCEDURE — 80100014 HC HEMODIALYSIS 1:1

## 2024-01-11 PROCEDURE — 80069 RENAL FUNCTION PANEL: CPT | Performed by: NURSE PRACTITIONER

## 2024-01-11 PROCEDURE — 94761 N-INVAS EAR/PLS OXIMETRY MLT: CPT

## 2024-01-11 RX ORDER — METOCLOPRAMIDE HYDROCHLORIDE 5 MG/ML
5 INJECTION INTRAMUSCULAR; INTRAVENOUS EVERY 6 HOURS
Status: DISCONTINUED | OUTPATIENT
Start: 2024-01-11 | End: 2024-01-17

## 2024-01-11 RX ORDER — INSULIN ASPART 100 [IU]/ML
0-10 INJECTION, SOLUTION INTRAVENOUS; SUBCUTANEOUS EVERY 4 HOURS PRN
Status: DISCONTINUED | OUTPATIENT
Start: 2024-01-11 | End: 2024-01-19

## 2024-01-11 RX ORDER — SEVELAMER CARBONATE 800 MG/1
1600 TABLET, FILM COATED ORAL
Status: DISCONTINUED | OUTPATIENT
Start: 2024-01-12 | End: 2024-01-11

## 2024-01-11 RX ORDER — HEPARIN SODIUM 1000 [USP'U]/ML
1000 INJECTION, SOLUTION INTRAVENOUS; SUBCUTANEOUS
Status: DISCONTINUED | OUTPATIENT
Start: 2024-01-11 | End: 2024-02-06 | Stop reason: HOSPADM

## 2024-01-11 RX ORDER — AMIODARONE HYDROCHLORIDE 200 MG/1
200 TABLET ORAL DAILY
Status: DISCONTINUED | OUTPATIENT
Start: 2024-01-19 | End: 2024-01-12

## 2024-01-11 RX ORDER — SEVELAMER CARBONATE 800 MG/1
1600 TABLET, FILM COATED ORAL
Status: DISCONTINUED | OUTPATIENT
Start: 2024-01-11 | End: 2024-01-15

## 2024-01-11 RX ORDER — ACETYLCYSTEINE 100 MG/ML
4 SOLUTION ORAL; RESPIRATORY (INHALATION)
Status: DISCONTINUED | OUTPATIENT
Start: 2024-01-11 | End: 2024-01-11

## 2024-01-11 RX ORDER — ACETYLCYSTEINE 100 MG/ML
4 SOLUTION ORAL; RESPIRATORY (INHALATION)
Status: DISCONTINUED | OUTPATIENT
Start: 2024-01-11 | End: 2024-01-18

## 2024-01-11 RX ORDER — ALBUTEROL SULFATE 2.5 MG/.5ML
2.5 SOLUTION RESPIRATORY (INHALATION)
Status: DISCONTINUED | OUTPATIENT
Start: 2024-01-11 | End: 2024-01-14

## 2024-01-11 RX ORDER — BISACODYL 10 MG/1
10 SUPPOSITORY RECTAL ONCE
Status: COMPLETED | OUTPATIENT
Start: 2024-01-11 | End: 2024-01-11

## 2024-01-11 RX ORDER — METOPROLOL TARTRATE 50 MG/1
50 TABLET ORAL 2 TIMES DAILY
Status: DISCONTINUED | OUTPATIENT
Start: 2024-01-11 | End: 2024-01-11

## 2024-01-11 RX ORDER — AMIODARONE HYDROCHLORIDE 200 MG/1
400 TABLET ORAL 2 TIMES DAILY
Status: DISCONTINUED | OUTPATIENT
Start: 2024-01-11 | End: 2024-01-12

## 2024-01-11 RX ORDER — METOPROLOL TARTRATE 1 MG/ML
10 INJECTION, SOLUTION INTRAVENOUS EVERY 6 HOURS
Status: DISCONTINUED | OUTPATIENT
Start: 2024-01-11 | End: 2024-01-11

## 2024-01-11 RX ORDER — ALBUTEROL SULFATE 2.5 MG/.5ML
2.5 SOLUTION RESPIRATORY (INHALATION) EVERY 4 HOURS PRN
Status: DISCONTINUED | OUTPATIENT
Start: 2024-01-11 | End: 2024-01-11

## 2024-01-11 RX ORDER — PANTOPRAZOLE SODIUM 40 MG/1
40 TABLET, DELAYED RELEASE ORAL DAILY
Status: DISCONTINUED | OUTPATIENT
Start: 2024-01-12 | End: 2024-01-23

## 2024-01-11 RX ADMIN — AMIODARONE HYDROCHLORIDE 0.5 MG/MIN: 1.8 INJECTION, SOLUTION INTRAVENOUS at 10:01

## 2024-01-11 RX ADMIN — METOCLOPRAMIDE HYDROCHLORIDE 5 MG: 10 INJECTION, SOLUTION INTRAMUSCULAR; INTRAVENOUS at 06:01

## 2024-01-11 RX ADMIN — INSULIN ASPART 1 UNITS: 100 INJECTION, SOLUTION INTRAVENOUS; SUBCUTANEOUS at 12:01

## 2024-01-11 RX ADMIN — INSULIN ASPART 2 UNITS: 100 INJECTION, SOLUTION INTRAVENOUS; SUBCUTANEOUS at 12:01

## 2024-01-11 RX ADMIN — METOROPROLOL TARTRATE 10 MG: 5 INJECTION, SOLUTION INTRAVENOUS at 05:01

## 2024-01-11 RX ADMIN — HEPARIN SODIUM 12 UNITS/KG/HR: 10000 INJECTION, SOLUTION INTRAVENOUS at 11:01

## 2024-01-11 RX ADMIN — PIPERACILLIN SODIUM AND TAZOBACTAM SODIUM 4.5 G: 4; .5 INJECTION, POWDER, FOR SOLUTION INTRAVENOUS at 04:01

## 2024-01-11 RX ADMIN — AMIODARONE HYDROCHLORIDE 1 MG/MIN: 1.8 INJECTION, SOLUTION INTRAVENOUS at 04:01

## 2024-01-11 RX ADMIN — INSULIN ASPART 1 UNITS: 100 INJECTION, SOLUTION INTRAVENOUS; SUBCUTANEOUS at 04:01

## 2024-01-11 RX ADMIN — SODIUM CHLORIDE: 9 INJECTION, SOLUTION INTRAVENOUS at 01:01

## 2024-01-11 RX ADMIN — HEPARIN SODIUM 1000 UNITS: 1000 INJECTION, SOLUTION INTRAVENOUS; SUBCUTANEOUS at 03:01

## 2024-01-11 RX ADMIN — VANCOMYCIN HYDROCHLORIDE 1000 MG: 1 INJECTION, POWDER, LYOPHILIZED, FOR SOLUTION INTRAVENOUS at 04:01

## 2024-01-11 RX ADMIN — PANTOPRAZOLE SODIUM 40 MG: 40 INJECTION, POWDER, FOR SOLUTION INTRAVENOUS at 08:01

## 2024-01-11 RX ADMIN — AMIODARONE HYDROCHLORIDE 400 MG: 200 TABLET ORAL at 03:01

## 2024-01-11 RX ADMIN — ACETYLCYSTEINE 4 ML: 100 INHALANT RESPIRATORY (INHALATION) at 02:01

## 2024-01-11 RX ADMIN — SEVELAMER CARBONATE 1600 MG: 800 TABLET, FILM COATED ORAL at 07:01

## 2024-01-11 RX ADMIN — ACETYLCYSTEINE 4 ML: 100 INHALANT RESPIRATORY (INHALATION) at 08:01

## 2024-01-11 RX ADMIN — METOCLOPRAMIDE HYDROCHLORIDE 5 MG: 10 INJECTION, SOLUTION INTRAMUSCULAR; INTRAVENOUS at 12:01

## 2024-01-11 RX ADMIN — BISACODYL 10 MG: 10 SUPPOSITORY RECTAL at 08:01

## 2024-01-11 RX ADMIN — INSULIN ASPART 2 UNITS: 100 INJECTION, SOLUTION INTRAVENOUS; SUBCUTANEOUS at 08:01

## 2024-01-11 RX ADMIN — INSULIN ASPART 2 UNITS: 100 INJECTION, SOLUTION INTRAVENOUS; SUBCUTANEOUS at 07:01

## 2024-01-11 RX ADMIN — LIDOCAINE 5% 1 PATCH: 700 PATCH TOPICAL at 10:01

## 2024-01-11 RX ADMIN — METOROPROLOL TARTRATE 10 MG: 5 INJECTION, SOLUTION INTRAVENOUS at 02:01

## 2024-01-11 RX ADMIN — ALBUTEROL SULFATE 2.5 MG: 2.5 SOLUTION RESPIRATORY (INHALATION) at 08:01

## 2024-01-11 NOTE — PROGRESS NOTES
Pt assisted to bedside commode. Call light placed in reach. Sitter at bedside. Instructed pt to call when ready to get up. Pt was given privacy as she had been hemodynamically stable throughout the shift. After leaving the room, bedside monitor alarming bradycardia. Upon entering the room, pts HR dropping into the 40s. Called help into the room. Pts HR got as low as 15 and she lost consciousness. Pt stimulated by myself and charge RN and she quickly regained consciousness. She was assisted back to the bed with RN x4. HR and BP quickly recovered to baseline values. EKG obtained. Pt now back in Afib, HR . MD Guero called to bedside. Future metoprolol doses dc. No other new orders.

## 2024-01-11 NOTE — PROGRESS NOTES
Pharmacokinetic Initial Assessment: IV Vancomycin    Assessment/Plan:  - Vancomycin 1000 mg IV x1 dose followed by post-HD dosing.  - Nephrology following for ESRD. Already completed HD today, so this first dose will be post-HD.  - Draw vancomycin level with morning labs tomorrow. Pharmacy to re-dose as needed depending on level and RRT plans. Goal trough 10-20 mcg/mL.    Pharmacy will continue to follow and monitor vancomycin.      Please contact pharmacy at extension [phone] with any questions regarding this assessment.     Thank you for the consult,   Joshua Harry     Patient brief summary:  Lily Green is a 73 y.o. female initiated on antimicrobial therapy with IV Vancomycin for treatment of suspected lower respiratory infection    Drug Allergies:   Review of patient's allergies indicates:   Allergen Reactions    Crestor [rosuvastatin] Swelling    Gluten protein      Actual Body Weight:   69 kg    Renal Function:   Estimated Creatinine Clearance: 10.6 mL/min (A) (based on SCr of 4.6 mg/dL (H)).,     Dialysis Method (if applicable):  intermittent HD

## 2024-01-11 NOTE — SUBJECTIVE & OBJECTIVE
Interval History: In and out of afib, currently sinus rhythm. Equivocal contrast challenge. Patient denies any nausea, but no flatus or BM.      Medications:  Continuous Infusions:   amiodarone in dextrose 5% 1 mg/min (01/11/24 0701)    heparin (porcine) in D5W 12 Units/kg/hr (01/11/24 0701)     Scheduled Meds:   sodium chloride 0.9%   Intravenous Once    bisacodyL  10 mg Rectal Once    epoetin noble (PROCRIT) injection  4,000 Units Intravenous Every Mon, Wed, Fri    LIDOcaine  1 patch Transdermal Q24H    metoclopramide  5 mg Intravenous Q6H    metoprolol  10 mg Intravenous Q4H    pantoprazole  40 mg Intravenous Daily     PRN Meds:bisacodyL, dextrose 10%, dextrose 10%, glucagon (human recombinant), glucose, glucose, heparin (PORCINE), heparin (PORCINE), hydrALAZINE, insulin aspart U-100, ondansetron     Objective:     Vital Signs (Most Recent):  Temp: 97.4 °F (36.3 °C) (01/11/24 0700)  Pulse: 68 (01/11/24 0727)  Resp: (!) 26 (01/11/24 0700)  BP: (!) 142/67 (01/11/24 0700)  SpO2: 96 % (01/11/24 0700) Vital Signs (24h Range):  Temp:  [97.4 °F (36.3 °C)-98.3 °F (36.8 °C)] 97.4 °F (36.3 °C)  Pulse:  [] 68  Resp:  [10-51] 26  SpO2:  [77 %-100 %] 96 %  BP: (102-208)/(53-99) 142/67  Arterial Line BP: (110-200)/(47-67) 139/50     Weight: 69 kg (152 lb 1.9 oz)  Body mass index is 25.31 kg/m².    SpO2: 96 %       Intake/Output - Last 3 Shifts         01/09 0700  01/10 0659 01/10 0700  01/11 0659 01/11 0700  01/12 0659    I.V. (mL/kg) 1276 (18.6) 1011.5 (14.7) 41.4 (0.6)    IV Piggyback 237.2 36.9     Total Intake(mL/kg) 1513.2 (22.1) 1048.4 (15.2) 41.4 (0.6)    Drains 250      Other       Stool       Total Output 250      Net +1263.2 +1048.4 +41.4           Stool Occurrence 1 x              Lines/Drains/Airways       Central Venous Catheter Line  Duration                  Hemodialysis Catheter 01/25/23 1501 right internal jugular 350 days              Drain  Duration                  NG/OG Tube 01/08/24 1303 nasogastric  Left nostril 2 days              Arterial Line  Duration             Arterial Line 01/08/24 1324 Right Radial 2 days              Peripheral Intravenous Line  Duration                  Hemodialysis AV Fistula 09/01/23 0800 Left forearm 132 days         Peripheral IV - Single Lumen 01/08/24 1108 22 G Posterior;Right Wrist 2 days         Peripheral IV - Single Lumen 01/08/24 1357 20 G Posterior;Right Forearm 2 days         Peripheral IV - Single Lumen 01/09/24 0542 20 G Anterior;Right Forearm 2 days                     Physical Exam  Vitals reviewed.   Constitutional:       General: She is not in acute distress.     Comments: Clamped NGT in place   Cardiovascular:      Rate and Rhythm: Normal rate and regular rhythm.   Pulmonary:      Effort: No respiratory distress.   Abdominal:      General: There is no distension.      Tenderness: There is no abdominal tenderness.      Comments: Tympany on percussion   Skin:     General: Skin is warm and dry.   Neurological:      General: No focal deficit present.      Mental Status: She is alert and oriented to person, place, and time.   Psychiatric:         Thought Content: Thought content normal.            Significant Labs:  All pertinent labs from the last 24 hours have been reviewed.    Significant Diagnostics:  I have reviewed all pertinent imaging results/findings within the past 24 hours.

## 2024-01-11 NOTE — CARE UPDATE
I have reviewed the chart of Lily Green and participated in the care of the patient who is hospitalized for the following:    Active Hospital Problems    Diagnosis    *Malignant carcinoid tumor of bronchus    Anticoagulated on heparin    Anuria    A-V fistula    Atrial fibrillation with rapid ventricular response    ESRD (end stage renal disease)    Type 2 diabetes mellitus with diabetic polyneuropathy, without long-term current use of insulin      I have reviewed Lily Green with the multidisciplinary team during rounds.      Enedelia Prather, BRITATNI  Unit-Based [facility]

## 2024-01-11 NOTE — SUBJECTIVE & OBJECTIVE
Interval History: HD yesterday. UF 1L. Following HD patient had several episodes of bradycardia. Appears to have been vagally mediated. This morning while moving patient she went into asystole. ROSC achieved after 1 round of CPR. Plan for TVP placement in cathlab today. Net -224.     Review of patient's allergies indicates:   Allergen Reactions    Crestor [rosuvastatin] Swelling    Gluten protein      Current Facility-Administered Medications   Medication Frequency    0.9%  NaCl infusion Once    acetylcysteine 100 mg/ml (10%) solution 4 mL TID WAKE    amiodarone 360 mg/200 mL (1.8 mg/mL) infusion Continuous    bisacodyL suppository 10 mg Daily PRN    dextrose 10% bolus 125 mL 125 mL PRN    dextrose 10% bolus 250 mL 250 mL PRN    epoetin noble injection 4,000 Units Every Mon, Wed, Fri    glucagon (human recombinant) injection 1 mg PRN    glucose chewable tablet 16 g PRN    glucose chewable tablet 24 g PRN    heparin 25,000 units in dextrose 5% (100 units/ml) IV bolus from bag - ADDITIONAL PRN BOLUS - 30 units/kg PRN    heparin 25,000 units in dextrose 5% (100 units/ml) IV bolus from bag - ADDITIONAL PRN BOLUS - 60 units/kg PRN    heparin 25,000 units in dextrose 5% 250 mL (100 units/mL) infusion LOW INTENSITY nomogram - [facility] Continuous    hydrALAZINE injection 10 mg Q6H PRN    insulin aspart U-100 pen 0-10 Units Q4H PRN    LIDOcaine 5 % patch 1 patch Q24H    metoclopramide injection 5 mg Q6H    metoprolol injection 10 mg Q6H    ondansetron injection 8 mg Q8H PRN    pantoprazole injection 40 mg Daily       Objective:     Vital Signs (Most Recent):  Temp: 97.4 °F (36.3 °C) (01/11/24 0700)  Pulse: 69 (01/11/24 1200)  Resp: (!) 26 (01/11/24 1200)  BP: (!) 149/68 (01/11/24 1200)  SpO2: (!) 91 % (01/11/24 1200) Vital Signs (24h Range):  Temp:  [97.4 °F (36.3 °C)-98.3 °F (36.8 °C)] 97.4 °F (36.3 °C)  Pulse:  [] 69  Resp:  [20-51] 26  SpO2:  [73 %-100 %] 91 %  BP: (102-168)/(53-89) 149/68  Arterial Line BP:  (110-183)/(50-67) 168/62     Weight: 69 kg (152 lb 1.9 oz) (01/11/24 0549)  Body mass index is 25.31 kg/m².  Body surface area is 1.78 meters squared.    I/O last 3 completed shifts:  In: 1810.8 [I.V.:1673.9; IV Piggyback:136.9]  Out: 50 [Drains:50]     Physical Exam  Vitals and nursing note reviewed.   HENT:      Head: Normocephalic.      Nose:      Comments: NGT with dark feculent output, slightly thinner than yesterday   Cardiovascular:      Rate and Rhythm: Normal rate and regular rhythm.      Comments: Right radial a line   Pulmonary:      Effort: Pulmonary effort is normal. No respiratory distress.      Comments: Right posterior chest incision c/d/I    Abdominal:      General: Abdomen is flat. There is no distension.      Palpations: Abdomen is soft. There is no mass.      Tenderness: There is no abdominal tenderness.      Hernia: No hernia is present.      Comments: Soft, nondistended  Nontender    Neurological:      General: No focal deficit present.      Mental Status: She is alert and oriented to person, place, and time.   Psychiatric:         Mood and Affect: Mood normal.         Behavior: Behavior normal.          Significant Labs:  CBC:   Recent Labs   Lab 01/11/24  0358   WBC 15.24*   RBC 3.11*   HGB 9.0*   HCT 26.2*      MCV 84   MCH 28.9   MCHC 34.4     CMP:   Recent Labs   Lab 01/11/24  0358   *   CALCIUM 9.1   ALBUMIN 2.3*   PROT 6.3      K 5.1   CO2 14*      BUN 50*   CREATININE 4.6*   ALKPHOS 285*   ALT 20   AST 44*   BILITOT 0.9     All labs within the past 24 hours have been reviewed.

## 2024-01-11 NOTE — PT/OT/SLP PROGRESS
Physical Therapy Treatment    Patient Name:  Lily Green   MRN:  [id number]    Recommendations:     Discharge Recommendations: Low Intensity Therapy  Discharge Equipment Recommendations: none  Barriers to discharge: None    Assessment:     Lily Green is a 73 y.o. female admitted with a medical diagnosis of Malignant carcinoid tumor of bronchus.  She presents with the following impairments/functional limitations: decreased safety awareness, impaired balance, impaired endurance, impaired functional mobility, gait instability pt tolerated treatment better being able to gait train farther. Pt will benefit from cont skilled PT 3x/wk to progress physically.  Patient continues to demonstrate the need for low intensity therapy on a scheduled basis exhibited by decreased independence with functional mobility Pt was evaluated 1/3 with dx of malignant CA tumor of bronchus. Pt is s/p lymphadenectomy, thoracotomy, resection wedge R lung 1/2. Pt was transferred to ICU 1/5 with tachycardia with syncope.     Rehab Prognosis: Good; patient would benefit from acute skilled PT services to address these deficits and reach maximum level of function.    Recent Surgery: Procedure(s) (LRB):  LYMPHADENECTOMY (Right)  THORACOTOMY (Right)  THORACOTOMY, WITH INITIAL THERAPEUTIC WEDGE RESECTION OF LUNG (Right) 9 Days Post-Op    Plan:     During this hospitalization, patient to be seen 3 x/week to address the identified rehab impairments via gait training, therapeutic activities and progress toward the following goals:    Plan of Care Expires:  02/08/24    Subjective     Chief Complaint: pt stated that MD told her not to exert herself due to dialysis today.   Patient/Family Comments/goals: to get better and go home.   Pain/Comfort:  Pain Rating 1: 0/10  Pain Rating Post-Intervention 1: 0/10      Objective:     Communicated with nurse  prior to session.  Patient found up in chair with telemetry, arterial line, blood  pressure cuff, pulse ox (continuous), oxygen (R subclavian dialysis catheter) upon PT entry to room.     General Precautions: Standard, fall  Orthopedic Precautions: N/A  Braces: N/A  Respiratory Status: Nasal cannula, flow 2 L/min     Functional Mobility:  Bed Mobility:   pt needed verbal cues for hand placement and sequencing for stand to sit. Pt was not safe with beginning decent to sit without chair being locked or in place.   Rolling Right: minimum assistance  Sit to Supine: minimum assistance    Transfers:     Sit to Stand:  contact guard assistance with rolling walker. Pt stood x 2 reps.     Gait: pt received gait training ~ 26 ft x 2 reps ( 52 ft total) with RW and CGA. Pt had O2 intact and RN present with portable monitor. Pt had sitting rest period between distances gait trained and was additional assist of 1 to follow with chair.    Pt white board updated with current therapists name and level of mobility assistance needed.       AM-PAC 6 CLICK MOBILITY  Turning over in bed (including adjusting bedclothes, sheets and blankets)?: 3  Sitting down on and standing up from a chair with arms (e.g., wheelchair, bedside commode, etc.): 3  Moving from lying on back to sitting on the side of the bed?: 3  Moving to and from a bed to a chair (including a wheelchair)?: 3  Need to walk in hospital room?: 3  Climbing 3-5 steps with a railing?: 1  Basic Mobility Total Score: 16       Treatment & Education:  Pt and sister received verbal instructions in PT POC and both verbally expressed understanding of such.     Patient left supine with all lines intact, call button in reach, and RN and pt sister present..    GOALS:   Multidisciplinary Problems       Physical Therapy Goals          Problem: Physical Therapy    Goal Priority Disciplines Outcome Goal Variances Interventions   Physical Therapy Goal     PT, PT/OT Ongoing, Progressing     Description: PT goals until 2/924    1. Pt supine to sit with supervision-not met  2.  Pt sit to supine with supervision-not met  3. Pt sit to stand with AD if needed with supervision-not met  4. Pt to perform gait 150 ft with AD if needed with supervision.-not met  5. Pt to go up/down curb step with AD if needed with CGA.-not met                         Time Tracking:     PT Received On: 01/11/24  PT Start Time: 1056     PT Stop Time: 1117  PT Total Time (min): 21 min     Billable Minutes: Gait Training 21 min     Treatment Type: Treatment  PT/PTA: PT     Number of PTA visits since last PT visit: 0     01/11/2024

## 2024-01-11 NOTE — ASSESSMENT & PLAN NOTE
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 Stepped up to the ICU 1/5 with afib RVR refractory to IV metoprolol, started on amio gtt with conversion.      - CT removed 1/8  - Ngt placed 1/8 with alrge volume output; CT abd/pelvis 1/8 with SBO. Equivocal contrast challenge 1/10. Keep NGT clamped for now, awaiting reliable bowel function. NPO. Scheduled reglan for gastric motility and suppository this morning.  - Cards consult for refractory afib RVR, s/p chemical cardioversion. Return of afib RVR 1/7-1/8 and briefly again yesterday evening, restarted on amio gtt. Agree with amio and heparin gtt's until able to take PO. Currently sinus rhythm. Appreciate cards assistance.   - Nephrology following, appreciate assistance. Consider pulling more fluid today given persistent O2 requirement, tachypnea, net +4.7L from fluid resuscitation, and afib.  - Q8H RFP while NPO; daily CMP, CBC   - Appreciate SICU assistance

## 2024-01-11 NOTE — SUBJECTIVE & OBJECTIVE
"Interval HPI:   Overnight events: No acute events overnight. Patient in room 01460 CVICU/81107 CVICU A. Blood glucose stable. BG at and above goal on current insulin regimen (SSI ). BG trending upward. Steroid use- None. 9 Days Post-Op  Renal function- Abnormal - Creatinine 4.6  Vasopressors-  None       Endocrine will continue to follow and manage insulin orders inpatient.         Diet NPO     Eating:   NPO  Nausea: No  Hypoglycemia and intervention: No  Fever: No  TPN and/or TF: No      BP (!) 142/67 (BP Location: Right arm, Patient Position: Lying)   Pulse 69   Temp 97.4 °F (36.3 °C) (Oral)   Resp (!) 26   Ht 5' 5" (1.651 m)   Wt 69 kg (152 lb 1.9 oz)   LMP  (LMP Unknown) Comment: BEAU/ NO BSO  1986  SpO2 96%   Breastfeeding No   BMI 25.31 kg/m²     Labs Reviewed and Include    Recent Labs   Lab 01/11/24 0358   *   CALCIUM 9.1   ALBUMIN 2.3*   PROT 6.3      K 5.1   CO2 14*      BUN 50*   CREATININE 4.6*   ALKPHOS 285*   ALT 20   AST 44*   BILITOT 0.9     Lab Results   Component Value Date    WBC 15.24 (H) 01/11/2024    HGB 9.0 (L) 01/11/2024    HCT 26.2 (L) 01/11/2024    MCV 84 01/11/2024     01/11/2024     No results for input(s): "TSH", "FREET4" in the last 168 hours.  Lab Results   Component Value Date    HGBA1C 4.9 11/15/2023       Nutritional status:   Body mass index is 25.31 kg/m².  Lab Results   Component Value Date    ALBUMIN 2.3 (L) 01/11/2024    ALBUMIN 2.3 (L) 01/10/2024    ALBUMIN 2.4 (L) 01/10/2024     No results found for: "PREALBUMIN"    Estimated Creatinine Clearance: 10.6 mL/min (A) (based on SCr of 4.6 mg/dL (H)).    Accu-Checks  Recent Labs     01/09/24  0351 01/09/24  0804 01/09/24  1115 01/09/24  2002 01/10/24  0814 01/10/24  1137 01/10/24  1613 01/10/24  2024 01/11/24  0015 01/11/24  0358   POCTGLUCOSE 115* 147* 149* 152* 150* 162* 192* 215* 210* 220*       Current Medications and/or Treatments Impacting Glycemic Control  Immunotherapy:  "   Immunosuppressants       None          Steroids:   Hormones (From admission, onward)      None          Pressors:    Autonomic Drugs (From admission, onward)      None          Hyperglycemia/Diabetes Medications:   Antihyperglycemics (From admission, onward)      Start     Stop Route Frequency Ordered    01/11/24 0824  insulin aspart U-100 pen 0-10 Units         -- SubQ Every 4 hours PRN 01/11/24 0724

## 2024-01-11 NOTE — PLAN OF CARE
[facility]  Discharge Reassessment    Primary Care Provider: Pavel Calixto MD    Expected Discharge Date: 1/11/2024    Reassessment (most recent)       Discharge Reassessment - 01/11/24 0956          Discharge Reassessment    Assessment Type Discharge Planning Reassessment     Discharge Plan discussed with: Caregiver;Patient     Communicated CAMPBELL with patient/caregiver Date not available/Unable to determine     Discharge Plan A Home Health     Discharge Plan B Home;Home with family     DME Needed Upon Discharge  other (see comments)   TBD    Transition of Care Barriers None     Why the patient remains in the hospital Requires continued medical care        Post-Acute Status    Post-Acute Authorization Home Health;Placement     Post-Acute Placement Status Patient declined/refused     Home Health Status --   List Provided                    Per MD's Note, Interval History/Significant Events: GGC conducted yesterday, equivocal study. No BM yet. On 2L NC. Intermittently in and out of rate controlled afib        Discharge Plan A and Plan B have been determined by review of patient's clinical status, future medical and therapeutic needs, and coverage/benefits for post-acute care in coordination with multidisciplinary team members.     Alberto Fitch LMSW  Case Management [facility]

## 2024-01-11 NOTE — PROGRESS NOTES
[facility]  Critical Care - Surgery  Progress Note    Patient Name: Lily Green  MRN: [id number]  Admission Date: 1/2/2024  Hospital Length of Stay: 9 days  Code Status: Full Code  Attending Provider: Tan Thomson MD  Primary Care Provider: Pavel Calixto MD   Principal Problem: Malignant carcinoid tumor of bronchus    Subjective:     Interval History/Significant Events: GGC conducted yesterday, equivocal study. No BM yet. On 2L NC. Intermittently in and out of rate controlled afib     Follow-up For: Procedure(s) (LRB):  LYMPHADENECTOMY (Right)  THORACOTOMY (Right)  THORACOTOMY, WITH INITIAL THERAPEUTIC WEDGE RESECTION OF LUNG (Right)    Post-Operative Day: 9 Days Post-Op    Objective:     Vital Signs (Most Recent):  Temp: 97.4 °F (36.3 °C) (01/11/24 0700)  Pulse: 66 (01/11/24 0800)  Resp: (!) 29 (01/11/24 0800)  BP: (!) 143/69 (01/11/24 0800)  SpO2: 95 % (01/11/24 0800) Vital Signs (24h Range):  Temp:  [97.4 °F (36.3 °C)-98.3 °F (36.8 °C)] 97.4 °F (36.3 °C)  Pulse:  [] 66  Resp:  [11-51] 29  SpO2:  [77 %-100 %] 95 %  BP: (102-208)/(53-99) 143/69  Arterial Line BP: (110-200)/(50-67) 157/62     Weight: 69 kg (152 lb 1.9 oz)  Body mass index is 25.31 kg/m².      Intake/Output Summary (Last 24 hours) at 1/11/2024 0827  Last data filed at 1/11/2024 0800  Gross per 24 hour   Intake 996.41 ml   Output --   Net 996.41 ml          Physical Exam  Constitutional:       Appearance: Normal appearance.   HENT:      Nose:      Comments: NGT in place  Cardiovascular:      Rate and Rhythm: Normal rate and regular rhythm.      Pulses: Normal pulses.   Pulmonary:      Effort: Pulmonary effort is normal.   Abdominal:      General: Abdomen is flat. There is no distension.      Palpations: Abdomen is soft.      Tenderness: There is no abdominal tenderness.      Comments: Soft, nontender abdomen   Skin:     General: Skin is warm and dry.   Neurological:      General: No focal deficit  present.      Mental Status: She is alert.            Vents:  Oxygen Concentration (%): 28 (01/11/24 0412)    Lines/Drains/Airways       Central Venous Catheter Line  Duration                  Hemodialysis Catheter 01/25/23 1501 right internal jugular 350 days              Drain  Duration                  NG/OG Tube 01/08/24 1303 nasogastric Left nostril 2 days              Arterial Line  Duration             Arterial Line 01/08/24 1324 Right Radial 2 days              Peripheral Intravenous Line  Duration                  Hemodialysis AV Fistula 09/01/23 0800 Left forearm 132 days         Peripheral IV - Single Lumen 01/08/24 1108 22 G Posterior;Right Wrist 2 days         Peripheral IV - Single Lumen 01/08/24 1357 20 G Posterior;Right Forearm 2 days         Peripheral IV - Single Lumen 01/09/24 0542 20 G Anterior;Right Forearm 2 days                    Significant Labs:    CBC/Anemia Profile:  Recent Labs   Lab 01/10/24  0317 01/11/24  0358   WBC 10.08 15.24*   HGB 8.5* 9.0*   HCT 25.8* 26.2*    300   MCV 87 84   RDW 18.9* 18.6*        Chemistries:  Recent Labs   Lab 01/10/24  0317 01/10/24  0812 01/10/24  1424 01/10/24  2206 01/11/24  0358      < > 137  138 137 138   K 4.9   < > 4.6  4.6 5.2* 5.1      < > 105  105 108 106   CO2 16*   < > 13*  12* 13* 14*   BUN 33*   < > 40*  40* 48* 50*   CREATININE 3.4*   < > 4.1*  4.1* 4.3* 4.6*   CALCIUM 8.4*   < > 8.9  9.0 8.7 9.1   ALBUMIN 2.3*   < > 2.4* 2.3* 2.3*   PROT 5.9*  --   --   --  6.3   BILITOT 1.0  --   --   --  0.9   ALKPHOS 295*  --   --   --  285*   ALT 25  --   --   --  20   AST 54*  --   --   --  44*   MG 2.5  --  2.5 2.6 2.8*   PHOS 3.4   < > 2.9 4.2 4.5    < > = values in this interval not displayed.       All pertinent labs within the past 24 hours have been reviewed.    Significant Imaging:  I have reviewed all pertinent imaging results/findings within the past 24 hours.  Assessment/Plan:     Cardiac/Vascular  Atrial fibrillation  with rapid ventricular response  72 yo female with 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 Stepped up to the unit for persistent afib rvr.       Neuro/Psych:   -- Sedation: none  -- Pain: pain controlled w lidocaine patch, will restart home gabapentin for neuropathy once PO                Cards:   -- BP goals SBP >90, MAPs >55   - Off phenylephrine  -- Afib RVR   - Intermittently in rate controlled afib overnight   - cardiology consulted, appreciate assistance   - increased amio gtt back to 1 yesterday-- will decrease to .5 today    - transition to PO Amio once SBO resolved and able to take PO  - Metoprolol 10mg IV q4h   - transition to PO lopressor 25mg BID (will discuss equivalent dosing with pharmacy) once SBO resolved and able to take PO   - heparin low intensity w/ nomogram    - transition to PO eliquis 5mg BID once SBO resolved and able to take PO      Pulm:   -- Goal O2 sat > 90%  -- Decreased O2 req to 2L, satting upper 90s. Likely could dc, but patient prefers having O2 on  -- Chest tube placed d/c'd 1/8/24  -- FU CXR-- pleural effusions 1/10      Renal:  -- history of ESRD, dialysis dependent.  Monday Wednesday Friday schedule while in the hospital.   -- Was unable to get dialysis yesterday due to staffing, will get today   -- Pull more fluid today given persistent O2 requirement, tachypnea, net +4.7L from fluid resuscitation, and afib  -- last SLED on 1/8  -- trend BUN/Cr  -- patient is anuric  -- permacath in place last exchanged 10/2023  -- fistula unable to be used  Recent Labs   Lab 01/10/24  1424 01/10/24  2206 01/11/24  0358   BUN 40*  40* 48* 50*   CREATININE 4.1*  4.1* 4.3* 4.6*         FEN / GI:   -- CT on 1/8/24 demonstrating SBO  -- US on 1/8/24 showing distended gallbladder without signs of acute cholecystitis   - NPO  - NGT clamped  - Equivocal GGC 1/10, added scheduled reglan for gastric motility and  suppository this morning. Will likely dc NGT today if bowel movement   - BM 1/9. None since. Will add bowel reg on since no obstruction  -- Discontinue LR 50 cc/hr  - Chloride normalized with fluid replacement  -- Replace lytes as needed  -- Nutrition:  NPO 2/2 SBO, Dietician consult placed for complex dietary restriction      ID:   -- Tm: afebrile; WBC within normal limits  Recent Labs   Lab 01/09/24  0351 01/10/24  0317 01/11/24  0358   WBC 6.93  6.93 10.08 15.24*     -- Abx none      Heme/Onc:  -- Daily CBC  -- INR 1.2 this am   -- on heparin gtt for A-fib-- will convert to PO eliquis once ROBF  Recent Labs   Lab 01/09/24  0351 01/09/24  0802 01/09/24  1650 01/10/24  0317 01/10/24  0812 01/10/24  1424 01/10/24  2206 01/11/24  0358   HGB 8.4*  8.4*  --   --  8.5*  --   --   --  9.0*     221  --   --  238  --   --   --  300   APTT 60.9*  --   --   --    < > 39.2* 43.3* 39.0*   INR  --    < > 2.2* 1.2  --   --   --  1.2    < > = values in this interval not displayed.         Endo:   -- history of diabetes  -- Gluc goal 140-180  -- SSI      PPx:   Feeding:  NPO  Analgesia/Sedation:  Pain is controlled, no sedation  Thromboembolic prevention: heparin infusion  HOB >30: yes  Stress Ulcer ppx: pantoprazole IV  Glucose control: Critical care goal 140-180 g/dl, SSI  Bowel reg: n/a SBO. NGT decompression  Invasive Lines/Drains/Airway: Permacath, PIV x2, NGT, gurmeet  Deescalation: Amio decreased        Dispo/Code Status/Palliative:   -- SICU/ Full Code          Brooklyn Sapp MD  Critical Care - Surgery  [facility]

## 2024-01-11 NOTE — PROGRESS NOTES
[facility]  Cardiothoracic Surgery  Progress Note    Patient Name: Lily Green  MRN: [id number]  Admission Date: 1/2/2024  Hospital Length of Stay: 9 days  Code Status: Full Code   Attending Physician: Tan Thomson MD   Referring Provider: Tan Thomson MD  Principal Problem:Malignant carcinoid tumor of bronchus      Subjective:     Post-Op Info:  Procedure(s) (LRB):  LYMPHADENECTOMY (Right)  THORACOTOMY (Right)  THORACOTOMY, WITH INITIAL THERAPEUTIC WEDGE RESECTION OF LUNG (Right)   9 Days Post-Op     Interval History: In and out of afib, currently sinus rhythm. Equivocal contrast challenge. Patient denies any nausea, but no flatus or BM.      Medications:  Continuous Infusions:   amiodarone in dextrose 5% 1 mg/min (01/11/24 0701)    heparin (porcine) in D5W 12 Units/kg/hr (01/11/24 0701)     Scheduled Meds:   sodium chloride 0.9%   Intravenous Once    bisacodyL  10 mg Rectal Once    epoetin noble (PROCRIT) injection  4,000 Units Intravenous Every Mon, Wed, Fri    LIDOcaine  1 patch Transdermal Q24H    metoclopramide  5 mg Intravenous Q6H    metoprolol  10 mg Intravenous Q4H    pantoprazole  40 mg Intravenous Daily     PRN Meds:bisacodyL, dextrose 10%, dextrose 10%, glucagon (human recombinant), glucose, glucose, heparin (PORCINE), heparin (PORCINE), hydrALAZINE, insulin aspart U-100, ondansetron     Objective:     Vital Signs (Most Recent):  Temp: 97.4 °F (36.3 °C) (01/11/24 0700)  Pulse: 68 (01/11/24 0727)  Resp: (!) 26 (01/11/24 0700)  BP: (!) 142/67 (01/11/24 0700)  SpO2: 96 % (01/11/24 0700) Vital Signs (24h Range):  Temp:  [97.4 °F (36.3 °C)-98.3 °F (36.8 °C)] 97.4 °F (36.3 °C)  Pulse:  [] 68  Resp:  [10-51] 26  SpO2:  [77 %-100 %] 96 %  BP: (102-208)/(53-99) 142/67  Arterial Line BP: (110-200)/(47-67) 139/50     Weight: 69 kg (152 lb 1.9 oz)  Body mass index is 25.31 kg/m².    SpO2: 96 %       Intake/Output - Last 3 Shifts         01/09 0700  01/10 0659 01/10  0700  01/11 0659 01/11 0700  01/12 0659    I.V. (mL/kg) 1276 (18.6) 1011.5 (14.7) 41.4 (0.6)    IV Piggyback 237.2 36.9     Total Intake(mL/kg) 1513.2 (22.1) 1048.4 (15.2) 41.4 (0.6)    Drains 250      Other       Stool       Total Output 250      Net +1263.2 +1048.4 +41.4           Stool Occurrence 1 x              Lines/Drains/Airways       Central Venous Catheter Line  Duration                  Hemodialysis Catheter 01/25/23 1501 right internal jugular 350 days              Drain  Duration                  NG/OG Tube 01/08/24 1303 nasogastric Left nostril 2 days              Arterial Line  Duration             Arterial Line 01/08/24 1324 Right Radial 2 days              Peripheral Intravenous Line  Duration                  Hemodialysis AV Fistula 09/01/23 0800 Left forearm 132 days         Peripheral IV - Single Lumen 01/08/24 1108 22 G Posterior;Right Wrist 2 days         Peripheral IV - Single Lumen 01/08/24 1357 20 G Posterior;Right Forearm 2 days         Peripheral IV - Single Lumen 01/09/24 0542 20 G Anterior;Right Forearm 2 days                     Physical Exam  Vitals reviewed.   Constitutional:       General: She is not in acute distress.     Comments: Clamped NGT in place   Cardiovascular:      Rate and Rhythm: Normal rate and regular rhythm.   Pulmonary:      Effort: No respiratory distress.   Abdominal:      General: There is no distension.      Tenderness: There is no abdominal tenderness.      Comments: Tympany on percussion   Skin:     General: Skin is warm and dry.   Neurological:      General: No focal deficit present.      Mental Status: She is alert and oriented to person, place, and time.   Psychiatric:         Thought Content: Thought content normal.            Significant Labs:  All pertinent labs from the last 24 hours have been reviewed.    Significant Diagnostics:  I have reviewed all pertinent imaging results/findings within the past 24 hours.  Assessment/Plan:     * Malignant carcinoid  tumor of bronchus  74 yo female with ESRD (TTS), DDD, benign essential tremor, meniere's disease, HTN, HLD, DM2, GERD, Celiac's, IBS and 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 Stepped up to the ICU 1/5 with afib RVR refractory to IV metoprolol, started on amio gtt with conversion.      - CT removed 1/8  - Ngt placed 1/8 with alrge volume output; CT abd/pelvis 1/8 with SBO. Equivocal contrast challenge 1/10. Keep NGT clamped for now, awaiting reliable bowel function. NPO. Scheduled reglan for gastric motility and suppository this morning.  - Cards consult for refractory afib RVR, s/p chemical cardioversion. Return of afib RVR 1/7-1/8 and briefly again yesterday evening, restarted on amio gtt. Agree with amio and heparin gtt's until able to take PO. Currently sinus rhythm. Appreciate cards assistance.   - Nephrology following, appreciate assistance. Consider pulling more fluid today given persistent O2 requirement, tachypnea, net +4.7L from fluid resuscitation, and afib.  - Q8H RFP while NPO; daily CMP, CBC   - Appreciate SICU assistance         Mandeep Tripp, DO  Cardiothoracic Surgery  [facility]

## 2024-01-11 NOTE — PLAN OF CARE
01/11/24 0955   Post-Acute Status   Post-Acute Authorization Home Health   Home Health Status   (List Provided)     The SW provided the patient and caregiver a list of home health provider from [facility] website that services her area.     The SW will continue to follow.    Alberto Fitch LMSW  Case Management [facility]

## 2024-01-11 NOTE — PROGRESS NOTES
Bedside nurse informed of dialysis initiation.    1:1 Bedside HD treatment started as ordered.  VS's per Dialysis Flowsheet.    Dialysis Access: right TDC    Aspirated, flushed, and accessed using aseptic technique.    Will maintain telemetry and blood pressure monitoring throughout treatment.  Refer to dialysis flowsheet and MAR for details.

## 2024-01-11 NOTE — PLAN OF CARE
Problem: Physical Therapy  Goal: Physical Therapy Goal  Description: PT goals until 2/924    1. Pt supine to sit with supervision-not met  2. Pt sit to supine with supervision-not met  3. Pt sit to stand with AD if needed with supervision-not met  4. Pt to perform gait 150 ft with AD if needed with supervision.-not met  5. Pt to go up/down curb step with AD if needed with CGA.-not met    Outcome: Ongoing, Progressing   Goals remain appropriate. 1/11/2024

## 2024-01-11 NOTE — SUBJECTIVE & OBJECTIVE
Interval History/Significant Events: GGC conducted yesterday, equivocal study. No BM yet. On 2L NC. Intermittently in and out of rate controlled afib     Follow-up For: Procedure(s) (LRB):  LYMPHADENECTOMY (Right)  THORACOTOMY (Right)  THORACOTOMY, WITH INITIAL THERAPEUTIC WEDGE RESECTION OF LUNG (Right)    Post-Operative Day: 9 Days Post-Op    Objective:     Vital Signs (Most Recent):  Temp: 97.4 °F (36.3 °C) (01/11/24 0700)  Pulse: 66 (01/11/24 0800)  Resp: (!) 29 (01/11/24 0800)  BP: (!) 143/69 (01/11/24 0800)  SpO2: 95 % (01/11/24 0800) Vital Signs (24h Range):  Temp:  [97.4 °F (36.3 °C)-98.3 °F (36.8 °C)] 97.4 °F (36.3 °C)  Pulse:  [] 66  Resp:  [11-51] 29  SpO2:  [77 %-100 %] 95 %  BP: (102-208)/(53-99) 143/69  Arterial Line BP: (110-200)/(50-67) 157/62     Weight: 69 kg (152 lb 1.9 oz)  Body mass index is 25.31 kg/m².      Intake/Output Summary (Last 24 hours) at 1/11/2024 0827  Last data filed at 1/11/2024 0800  Gross per 24 hour   Intake 996.41 ml   Output --   Net 996.41 ml          Physical Exam  Constitutional:       Appearance: Normal appearance.   HENT:      Nose:      Comments: NGT in place  Cardiovascular:      Rate and Rhythm: Normal rate and regular rhythm.      Pulses: Normal pulses.   Pulmonary:      Effort: Pulmonary effort is normal.   Abdominal:      General: Abdomen is flat. There is no distension.      Palpations: Abdomen is soft.      Tenderness: There is no abdominal tenderness.      Comments: Soft, nontender abdomen   Skin:     General: Skin is warm and dry.   Neurological:      General: No focal deficit present.      Mental Status: She is alert.            Vents:  Oxygen Concentration (%): 28 (01/11/24 0412)    Lines/Drains/Airways       Central Venous Catheter Line  Duration                  Hemodialysis Catheter 01/25/23 1501 right internal jugular 350 days              Drain  Duration                  NG/OG Tube 01/08/24 4403 nasogastric Left nostril 2 days              Arterial  Line  Duration             Arterial Line 01/08/24 1324 Right Radial 2 days              Peripheral Intravenous Line  Duration                  Hemodialysis AV Fistula 09/01/23 0800 Left forearm 132 days         Peripheral IV - Single Lumen 01/08/24 1108 22 G Posterior;Right Wrist 2 days         Peripheral IV - Single Lumen 01/08/24 1357 20 G Posterior;Right Forearm 2 days         Peripheral IV - Single Lumen 01/09/24 0542 20 G Anterior;Right Forearm 2 days                    Significant Labs:    CBC/Anemia Profile:  Recent Labs   Lab 01/10/24  0317 01/11/24  0358   WBC 10.08 15.24*   HGB 8.5* 9.0*   HCT 25.8* 26.2*    300   MCV 87 84   RDW 18.9* 18.6*        Chemistries:  Recent Labs   Lab 01/10/24  0317 01/10/24  0812 01/10/24  1424 01/10/24  2206 01/11/24  0358      < > 137  138 137 138   K 4.9   < > 4.6  4.6 5.2* 5.1      < > 105  105 108 106   CO2 16*   < > 13*  12* 13* 14*   BUN 33*   < > 40*  40* 48* 50*   CREATININE 3.4*   < > 4.1*  4.1* 4.3* 4.6*   CALCIUM 8.4*   < > 8.9  9.0 8.7 9.1   ALBUMIN 2.3*   < > 2.4* 2.3* 2.3*   PROT 5.9*  --   --   --  6.3   BILITOT 1.0  --   --   --  0.9   ALKPHOS 295*  --   --   --  285*   ALT 25  --   --   --  20   AST 54*  --   --   --  44*   MG 2.5  --  2.5 2.6 2.8*   PHOS 3.4   < > 2.9 4.2 4.5    < > = values in this interval not displayed.       All pertinent labs within the past 24 hours have been reviewed.    Significant Imaging:  I have reviewed all pertinent imaging results/findings within the past 24 hours.

## 2024-01-11 NOTE — ASSESSMENT & PLAN NOTE
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 Stepped up to the unit for persistent afib rvr.       Neuro/Psych:   -- Sedation: none  -- Pain: pain controlled w lidocaine patch, will restart home gabapentin for neuropathy once PO                Cards:   -- BP goals SBP >90, MAPs >55   - Off phenylephrine  -- Afib RVR   - Intermittently in rate controlled afib overnight   - cardiology consulted, appreciate assistance   - increased amio gtt back to 1 yesterday    - transition to PO Amio once SBO resolved and able to take PO  - Metoprolol 10mg IV q4h   - transition to PO lopressor 25mg BID (will discuss equivalent dosing with pharmacy) once SBO resolved and able to take PO   - heparin low intensity w/ nomogram    - transition to PO eliquis 5mg BID once SBO resolved and able to take PO      Pulm:   -- Goal O2 sat > 90%  -- Decreased O2 req to 2L, satting upper 90s. Likely could dc, but patient prefers having O2 on  -- Chest tube placed d/c'd 1/8/24  -- FU CXR-- pleural effusions 1/10      Renal:  -- history of ESRD, dialysis dependent.  Monday Wednesday Friday schedule while in the hospital.   -- Was unable to get dialysis yesterday due to staffing, will get today   -- Pull more fluid today given persistent O2 requirement, tachypnea, net +4.7L from fluid resuscitation, and afib  -- last SLED on 1/8  -- trend BUN/Cr  -- patient is anuric  -- permacath in place last exchanged 10/2023  -- fistula unable to be used  Recent Labs   Lab 01/10/24  1424 01/10/24  2206 01/11/24  0358   BUN 40*  40* 48* 50*   CREATININE 4.1*  4.1* 4.3* 4.6*         FEN / GI:   -- CT on 1/8/24 demonstrating SBO  -- US on 1/8/24 showing distended gallbladder without signs of acute cholecystitis   - NPO  - NGT clamped  - Equivocal GGC 1/10, added scheduled reglan for gastric motility and suppository this morning. Will likely dc NGT today if bowel  movement   - BM 1/9. None since. Will add bowel reg on since no obstruction  -- Discontinue LR 50 cc/hr  - Chloride normalized with fluid replacement  -- Replace lytes as needed  -- Nutrition:  NPO 2/2 SBO, Dietician consult placed for complex dietary restriction      ID:   -- Tm: afebrile; WBC within normal limits  Recent Labs   Lab 01/09/24  0351 01/10/24  0317 01/11/24  0358   WBC 6.93  6.93 10.08 15.24*     -- Abx none      Heme/Onc:  -- Daily CBC  -- INR 1.2 this am   -- on heparin gtt for A-fib-- will convert to PO eliquis once ROBF  Recent Labs   Lab 01/09/24  0351 01/09/24  0802 01/09/24  1650 01/10/24  0317 01/10/24  0812 01/10/24  1424 01/10/24  2206 01/11/24  0358   HGB 8.4*  8.4*  --   --  8.5*  --   --   --  9.0*     221  --   --  238  --   --   --  300   APTT 60.9*  --   --   --    < > 39.2* 43.3* 39.0*   INR  --    < > 2.2* 1.2  --   --   --  1.2    < > = values in this interval not displayed.         Endo:   -- history of diabetes  -- Gluc goal 140-180  -- SSI      PPx:   Feeding:  NPO  Analgesia/Sedation:  Pain is controlled, no sedation  Thromboembolic prevention: heparin infusion  HOB >30: yes  Stress Ulcer ppx: pantoprazole IV  Glucose control: Critical care goal 140-180 g/dl, SSI  Bowel reg: n/a SBO. NGT decompression  Invasive Lines/Drains/Airway: Permacath, PIV x2, NGT  Deescalation: Amio transition to PO. Possible step down        Dispo/Code Status/Palliative:   -- SICU/ Full Code

## 2024-01-11 NOTE — PLAN OF CARE
NAEON. Pt afebrile. HR 80-90s. MAP >55. SBP <160. SpO2>90% on 2L NC. Patient following commands and moving all extremities.     Gtts:  Amiodarone 1mg/min  Heparin 12 units/kg/hr. Pt therapeutic this AM.     NGT in place. Ordered to keep clamped overnight.   Pt anuric, planning for HD today.     PoC reviewed with patient. All questions/concerns addressed.       Skin: No new skin breakdown. Foams and pillows in place. Turn q2h.

## 2024-01-11 NOTE — PROGRESS NOTES
[facility]  Endocrinology  Progress Note    Admit Date: 1/2/2024     Reason for Consult: Management of T2DM, Hyperglycemia     Surgical Procedure and Date: 1/2/24--   LYMPHADENECTOMY (Right)  THORACOTOMY (Right)  THORACOTOMY, WITH INITIAL THERAPEUTIC WEDGE RESECTION OF LUNG (Right)    Diabetes diagnosis year: in her 50s    Home Diabetes Medications:    LDC SSI   Blood sugar 150 to 200, + 1 unit  Blood sugar 201 to 250, + 2 units  Blood sugar 251 to 300, + 3 units  Blood sugar 301 to 350, + 4 units   Blood sugar greater than 350, + 5 units    How often checking glucose at home? >4 x day   BG readings on regimen: 100-120s  Hypoglycemia on the regimen?  denies   Missed doses on regimen?  No    Diabetes Complications include:     Nephropathy     Complicating diabetes co morbidities:   ESRD and Glucocorticoid use       HPI:   Patient is a 73 y.o. female with DDD, benign essential tremor, meniere's disease, HTN, HLD, DM2, GERD, Celiac's, IBS who is found to have RML Carcinoid tumor.  Chest CT 10/18/23 revealed 1.2 x 1.1 cm right middle lobe nodule, previously 1.2 x 1.0 cm in July 2023. Underwent Robotic Bronchoscopy 10/20/23; path: RML nodule positive for well-differentiated neuroendocrine neoplasm, carcinoid tumor. PET/CT Cu64 11/17/23 showed known RML nodule with hypermetabolic activity. Now presents s/p thoracotomy with wedge resection of right lung. Endocrine consulted for BG management.         Interval HPI:   Overnight events: No acute events overnight. Patient in room 40351 CVICU/48681 CVICU A. Blood glucose stable. BG at and above goal on current insulin regimen (SSI ). BG trending upward. Steroid use- None. 9 Days Post-Op  Renal function- Abnormal - Creatinine 4.6  Vasopressors-  None       Endocrine will continue to follow and manage insulin orders inpatient.         Diet NPO     Eating:   NPO  Nausea: No  Hypoglycemia and intervention: No  Fever: No  TPN and/or TF: No      BP (!) 142/67  "(BP Location: Right arm, Patient Position: Lying)   Pulse 69   Temp 97.4 °F (36.3 °C) (Oral)   Resp (!) 26   Ht 5' 5" (1.651 m)   Wt 69 kg (152 lb 1.9 oz)   LMP  (LMP Unknown) Comment: BEAU/ NO BSO  1986  SpO2 96%   Breastfeeding No   BMI 25.31 kg/m²     Labs Reviewed and Include    Recent Labs   Lab 01/11/24 0358   *   CALCIUM 9.1   ALBUMIN 2.3*   PROT 6.3      K 5.1   CO2 14*      BUN 50*   CREATININE 4.6*   ALKPHOS 285*   ALT 20   AST 44*   BILITOT 0.9     Lab Results   Component Value Date    WBC 15.24 (H) 01/11/2024    HGB 9.0 (L) 01/11/2024    HCT 26.2 (L) 01/11/2024    MCV 84 01/11/2024     01/11/2024     No results for input(s): "TSH", "FREET4" in the last 168 hours.  Lab Results   Component Value Date    HGBA1C 4.9 11/15/2023       Nutritional status:   Body mass index is 25.31 kg/m².  Lab Results   Component Value Date    ALBUMIN 2.3 (L) 01/11/2024    ALBUMIN 2.3 (L) 01/10/2024    ALBUMIN 2.4 (L) 01/10/2024     No results found for: "PREALBUMIN"    Estimated Creatinine Clearance: 10.6 mL/min (A) (based on SCr of 4.6 mg/dL (H)).    Accu-Checks  Recent Labs     01/09/24  0351 01/09/24  0804 01/09/24  1115 01/09/24  2002 01/10/24  0814 01/10/24  1137 01/10/24  1613 01/10/24  2024 01/11/24  0015 01/11/24  0358   POCTGLUCOSE 115* 147* 149* 152* 150* 162* 192* 215* 210* 220*       Current Medications and/or Treatments Impacting Glycemic Control  Immunotherapy:    Immunosuppressants       None          Steroids:   Hormones (From admission, onward)      None          Pressors:    Autonomic Drugs (From admission, onward)      None          Hyperglycemia/Diabetes Medications:   Antihyperglycemics (From admission, onward)      Start     Stop Route Frequency Ordered    01/11/24 0824  insulin aspart U-100 pen 0-10 Units         -- SubQ Every 4 hours PRN 01/11/24 0724              ASSESSMENT and PLAN    Renal/  ESRD (end stage renal disease)    Titrate insulin slowly to avoid " hypoglycemia as the risk of hypoglycemia increases with decreased creatinine clearance.      Oncology  * Malignant carcinoid tumor of bronchus  Managed per primary team        Endocrine  Type 2 diabetes mellitus with diabetic polyneuropathy, without long-term current use of insulin  BG goal: 140-180    - Novolog Mod dose correction with ISF 25 starting at 150 prn ac/hs   - POCT Glucose before meals and at bedtime  - Hypoglycemia protocol in place      ** Please notify Endocrine for any change and/or advance in diet**  ** Please call Endocrine for any BG related issues **     Discharge Planning:   TBD. Please notify endocrinology prior to discharge.             Pranav Perry, DNP, FNP  Endocrinology  [facility]

## 2024-01-11 NOTE — PLAN OF CARE
01/11/24 0947   Post-Acute Status   Post-Acute Authorization Placement   Post-Acute Placement Status Patient declined/refused     The SW met with the patient and her caregiver at bedside to follow-up regarding the patient's d/c plans. The SW inquired about the patient's living arrangements. The patient reported that she lives alone but has a caregiver that works with her at home. The SW discussed with the patient d/c options which are SNF vs Home with Home Health. The patient refused Skilled placement. The SW discussed with the patient home health services. The patient's caregiver reported that she was going to stay with the patient longer upon d/c to assist her.       The SW notified the patient's care team via secure chat about the patient's preference for home health.     Alberto Fitch LMSW  Case Management [facility]

## 2024-01-11 NOTE — PROGRESS NOTES
[facility]     Patient currently on hemodialysis for removal of uremic toxins and volume.     Patient seen and evaluated on hemodialysis, tolerating treatment, see HD flowsheet for vitals and assessments.    Labs have been reviewed and the dialysate bath has been adjusted.       Assessment/Plan:    -Pt did not receive HD yesterday due to high patient census and need to triage    -Patient seen on HD, tolerating treatment well, w/o complaints   -In and out of afib, continues on amio gtt   -UF goal of 1L as tolerated   -Renal diet, if not NPO   -Strict I/O's and daily weights  -Daily renal function panels  -Keep MAP >65 while on HD   -Hgb goal 10-11  -epo with HD   -Will continue to follow while inpatient     Christy Gaytan DNP-FNP, C  Nephrology  Pager: [phone]

## 2024-01-11 NOTE — PT/OT/SLP PROGRESS
Occupational Therapy      Patient Name:  Lily Green   MRN:  [id number]    Patient not seen today secondary to beginning PT session in am upon attempt, then HD in pm.  Will follow-up 1/12/24.    1/11/2024

## 2024-01-11 NOTE — PROGRESS NOTES
Bedside hemodialysis treatment completed.    Treatment time received: 2.5 hours    Net fluid removed: 1 liter    No issues during HD treatment.    Blood returned, heparin locked ports per order, capped, and taped.    Update given to bedside nurse Dennise Lai RN  Refer to dialysis flowsheet and MAR for details.

## 2024-01-11 NOTE — ASSESSMENT & PLAN NOTE
BG goal: 140-180    - Novolog Mod dose correction with ISF 25 starting at 150 prn ac/hs   - POCT Glucose before meals and at bedtime  - Hypoglycemia protocol in place      ** Please notify Endocrine for any change and/or advance in diet**  ** Please call Endocrine for any BG related issues **     Discharge Planning:   TBD. Please notify endocrinology prior to discharge.

## 2024-01-12 PROBLEM — R00.1 BRADYCARDIA: Status: ACTIVE | Noted: 2024-01-12

## 2024-01-12 LAB
ACINETOBACTER CALCOACETICUS/BAUMANNII COMPLEX: NOT DETECTED
ALBUMIN SERPL BCP-MCNC: 2.1 G/DL (ref 3.5–5.2)
ALBUMIN SERPL BCP-MCNC: 2.2 G/DL (ref 3.5–5.2)
ALBUMIN SERPL BCP-MCNC: 2.2 G/DL (ref 3.5–5.2)
ALLENS TEST: ABNORMAL
ALP SERPL-CCNC: 226 U/L (ref 55–135)
ALP SERPL-CCNC: 231 U/L (ref 55–135)
ALT SERPL W/O P-5'-P-CCNC: 16 U/L (ref 10–44)
ALT SERPL W/O P-5'-P-CCNC: 19 U/L (ref 10–44)
ANION GAP SERPL CALC-SCNC: 17 MMOL/L (ref 8–16)
ANION GAP SERPL CALC-SCNC: 18 MMOL/L (ref 8–16)
ANION GAP SERPL CALC-SCNC: 20 MMOL/L (ref 8–16)
ANISOCYTOSIS BLD QL SMEAR: SLIGHT
APTT PPP: 24.9 SEC (ref 21–32)
APTT PPP: 33.9 SEC (ref 21–32)
APTT PPP: 34.9 SEC (ref 21–32)
APTT PPP: 62.5 SEC (ref 21–32)
ASCENDING AORTA: 3.9 CM
AST SERPL-CCNC: 32 U/L (ref 10–40)
AST SERPL-CCNC: 35 U/L (ref 10–40)
AV INDEX (PROSTH): 0.84
AV MEAN GRADIENT: 3 MMHG
AV PEAK GRADIENT: 5 MMHG
AV VALVE AREA BY VELOCITY RATIO: 2.66 CM²
AV VALVE AREA: 2.7 CM²
AV VELOCITY RATIO: 0.83
BACTEROIDES FRAGILIS: NOT DETECTED
BASOPHILS # BLD AUTO: ABNORMAL K/UL (ref 0–0.2)
BASOPHILS NFR BLD: 0 % (ref 0–1.9)
BILIRUB SERPL-MCNC: 0.8 MG/DL (ref 0.1–1)
BILIRUB SERPL-MCNC: 0.9 MG/DL (ref 0.1–1)
BSA FOR ECHO PROCEDURE: 1.78 M2
BUN SERPL-MCNC: 29 MG/DL (ref 8–23)
BUN SERPL-MCNC: 34 MG/DL (ref 8–23)
BUN SERPL-MCNC: 35 MG/DL (ref 8–23)
BUN SERPL-MCNC: 38 MG/DL (ref 8–23)
BUN SERPL-MCNC: 45 MG/DL (ref 8–23)
CA-I BLDV-SCNC: 1.04 MMOL/L (ref 1.06–1.42)
CALCIUM SERPL-MCNC: 8 MG/DL (ref 8.7–10.5)
CALCIUM SERPL-MCNC: 8.3 MG/DL (ref 8.7–10.5)
CALCIUM SERPL-MCNC: 8.6 MG/DL (ref 8.7–10.5)
CALCIUM SERPL-MCNC: 8.6 MG/DL (ref 8.7–10.5)
CALCIUM SERPL-MCNC: 8.8 MG/DL (ref 8.7–10.5)
CANDIDA ALBICANS: NOT DETECTED
CANDIDA AURIS: NOT DETECTED
CANDIDA GLABRATA: NOT DETECTED
CANDIDA KRUSEI: NOT DETECTED
CANDIDA PARAPSILOSIS: NOT DETECTED
CANDIDA TROPICALIS: NOT DETECTED
CHLORIDE SERPL-SCNC: 101 MMOL/L (ref 95–110)
CHLORIDE SERPL-SCNC: 101 MMOL/L (ref 95–110)
CHLORIDE SERPL-SCNC: 102 MMOL/L (ref 95–110)
CHLORIDE SERPL-SCNC: 102 MMOL/L (ref 95–110)
CHLORIDE SERPL-SCNC: 104 MMOL/L (ref 95–110)
CO2 SERPL-SCNC: 20 MMOL/L (ref 23–29)
CO2 SERPL-SCNC: 21 MMOL/L (ref 23–29)
CO2 SERPL-SCNC: 21 MMOL/L (ref 23–29)
CO2 SERPL-SCNC: 22 MMOL/L (ref 23–29)
CO2 SERPL-SCNC: 23 MMOL/L (ref 23–29)
CREAT SERPL-MCNC: 3 MG/DL (ref 0.5–1.4)
CREAT SERPL-MCNC: 3.5 MG/DL (ref 0.5–1.4)
CREAT SERPL-MCNC: 3.6 MG/DL (ref 0.5–1.4)
CREAT SERPL-MCNC: 4 MG/DL (ref 0.5–1.4)
CREAT SERPL-MCNC: 4.4 MG/DL (ref 0.5–1.4)
CRYPTOCOCCUS NEOFORMANS/GATTII: NOT DETECTED
CTX-M GENE (ESBL PRODUCER): ABNORMAL
CV ECHO LV RWT: 0.59 CM
DELSYS: ABNORMAL
DIFFERENTIAL METHOD BLD: ABNORMAL
DIGOXIN SERPL-MCNC: 2.9 NG/ML (ref 0.8–2)
DOP CALC AO PEAK VEL: 1.17 M/S
DOP CALC AO VTI: 20.69 CM
DOP CALC LVOT AREA: 3.2 CM2
DOP CALC LVOT DIAMETER: 2.02 CM
DOP CALC LVOT PEAK VEL: 0.97 M/S
DOP CALC LVOT STROKE VOLUME: 55.93 CM3
DOP CALCLVOT PEAK VEL VTI: 17.46 CM
ECHO LV POSTERIOR WALL: 1.02 CM (ref 0.6–1.1)
ENTEROBACTER CLOACAE COMPLEX: NOT DETECTED
ENTEROBACTERALES: NOT DETECTED
ENTEROCOCCUS FAECALIS: NOT DETECTED
ENTEROCOCCUS FAECIUM: NOT DETECTED
EOSINOPHIL # BLD AUTO: ABNORMAL K/UL (ref 0–0.5)
EOSINOPHIL NFR BLD: 0 % (ref 0–8)
ERYTHROCYTE [DISTWIDTH] IN BLOOD BY AUTOMATED COUNT: 18.1 % (ref 11.5–14.5)
ESCHERICHIA COLI: NOT DETECTED
EST. GFR  (NO RACE VARIABLE): 10.1 ML/MIN/1.73 M^2
EST. GFR  (NO RACE VARIABLE): 11.3 ML/MIN/1.73 M^2
EST. GFR  (NO RACE VARIABLE): 12.8 ML/MIN/1.73 M^2
EST. GFR  (NO RACE VARIABLE): 13.2 ML/MIN/1.73 M^2
EST. GFR  (NO RACE VARIABLE): 15.9 ML/MIN/1.73 M^2
FRACTIONAL SHORTENING: 33 % (ref 28–44)
GLUCOSE SERPL-MCNC: 162 MG/DL (ref 70–110)
GLUCOSE SERPL-MCNC: 174 MG/DL (ref 70–110)
GLUCOSE SERPL-MCNC: 176 MG/DL (ref 70–110)
GLUCOSE SERPL-MCNC: 197 MG/DL (ref 70–110)
GLUCOSE SERPL-MCNC: 198 MG/DL (ref 70–110)
HAEMOPHILUS INFLUENZAE: NOT DETECTED
HCO3 UR-SCNC: 22.8 MMOL/L (ref 24–28)
HCT VFR BLD AUTO: 24.5 % (ref 37–48.5)
HCT VFR BLD CALC: 27 %PCV (ref 36–54)
HGB BLD-MCNC: 8.9 G/DL (ref 12–16)
HYPOCHROMIA BLD QL SMEAR: ABNORMAL
IMM GRANULOCYTES # BLD AUTO: ABNORMAL K/UL (ref 0–0.04)
IMM GRANULOCYTES NFR BLD AUTO: ABNORMAL % (ref 0–0.5)
IMP GENE (CARBAPENEM RESISTANT): ABNORMAL
INR PPP: 1.2 (ref 0.8–1.2)
INR PPP: 1.2 (ref 0.8–1.2)
INTERVENTRICULAR SEPTUM: 0.86 CM (ref 0.6–1.1)
KLEBSIELLA AEROGENES: NOT DETECTED
KLEBSIELLA OXYTOCA: NOT DETECTED
KLEBSIELLA PNEUMONIAE GROUP: NOT DETECTED
KPC RESISTANCE GENE (CARBAPENEM): ABNORMAL
LA MAJOR: 4.58 CM
LA MINOR: 4.81 CM
LA WIDTH: 4.14 CM
LEFT ATRIUM SIZE: 3.24 CM
LEFT ATRIUM VOLUME INDEX MOD: 29.6 ML/M2
LEFT ATRIUM VOLUME INDEX: 30.4 ML/M2
LEFT ATRIUM VOLUME MOD: 52.06 CM3
LEFT ATRIUM VOLUME: 53.5 CM3
LEFT INTERNAL DIMENSION IN SYSTOLE: 2.32 CM (ref 2.1–4)
LEFT VENTRICLE DIASTOLIC VOLUME INDEX: 28.25 ML/M2
LEFT VENTRICLE DIASTOLIC VOLUME: 49.72 ML
LEFT VENTRICLE MASS INDEX: 53 G/M2
LEFT VENTRICLE SYSTOLIC VOLUME INDEX: 10.5 ML/M2
LEFT VENTRICLE SYSTOLIC VOLUME: 18.49 ML
LEFT VENTRICULAR INTERNAL DIMENSION IN DIASTOLE: 3.47 CM (ref 3.5–6)
LEFT VENTRICULAR MASS: 93.24 G
LISTERIA MONOCYTOGENES: NOT DETECTED
LYMPHOCYTES # BLD AUTO: ABNORMAL K/UL (ref 1–4.8)
LYMPHOCYTES NFR BLD: 6 % (ref 18–48)
MAGNESIUM SERPL-MCNC: 2.2 MG/DL (ref 1.6–2.6)
MAGNESIUM SERPL-MCNC: 2.3 MG/DL (ref 1.6–2.6)
MAGNESIUM SERPL-MCNC: 2.4 MG/DL (ref 1.6–2.6)
MAGNESIUM SERPL-MCNC: 2.4 MG/DL (ref 1.6–2.6)
MAGNESIUM SERPL-MCNC: 2.5 MG/DL (ref 1.6–2.6)
MCH RBC QN AUTO: 29.1 PG (ref 27–31)
MCHC RBC AUTO-ENTMCNC: 36.3 G/DL (ref 32–36)
MCR-1: ABNORMAL
MCV RBC AUTO: 80 FL (ref 82–98)
MEC A/C AND MREJ (MRSA): ABNORMAL
MEC A/C: NOT DETECTED
METAMYELOCYTES NFR BLD MANUAL: 1 %
MONOCYTES # BLD AUTO: ABNORMAL K/UL (ref 0.3–1)
MONOCYTES NFR BLD: 7 % (ref 4–15)
MV PEAK E VEL: 0.92 M/S
MYELOCYTES NFR BLD MANUAL: 3 %
NDM GENE (CARBAPENEM RESISTANT): ABNORMAL
NEISSERIA MENINGITIDIS: NOT DETECTED
NEUTROPHILS NFR BLD: 83 % (ref 38–73)
NRBC BLD-RTO: 0 /100 WBC
OVALOCYTES BLD QL SMEAR: ABNORMAL
OXA-48-LIKE (CARBAPENEM RESISTANT): ABNORMAL
PCO2 BLDA: 31.3 MMHG (ref 35–45)
PH SMN: 7.47 [PH] (ref 7.35–7.45)
PHOSPHATE SERPL-MCNC: 3.4 MG/DL (ref 2.7–4.5)
PHOSPHATE SERPL-MCNC: 4.2 MG/DL (ref 2.7–4.5)
PHOSPHATE SERPL-MCNC: 4.4 MG/DL (ref 2.7–4.5)
PHOSPHATE SERPL-MCNC: 4.9 MG/DL (ref 2.7–4.5)
PHOSPHATE SERPL-MCNC: 5.6 MG/DL (ref 2.7–4.5)
PISA MRMAX VEL: 0.05 M/S
PISA TR MAX VEL: 2.53 M/S
PLATELET # BLD AUTO: 312 K/UL (ref 150–450)
PLATELET BLD QL SMEAR: ABNORMAL
PMV BLD AUTO: 11.1 FL (ref 9.2–12.9)
PO2 BLDA: 94 MMHG (ref 80–100)
POC BE: -1 MMOL/L
POC IONIZED CALCIUM: 1.08 MMOL/L (ref 1.06–1.42)
POC SATURATED O2: 98 % (ref 95–100)
POC TCO2: 24 MMOL/L (ref 23–27)
POCT GLUCOSE: 139 MG/DL (ref 70–110)
POCT GLUCOSE: 167 MG/DL (ref 70–110)
POCT GLUCOSE: 170 MG/DL (ref 70–110)
POCT GLUCOSE: 185 MG/DL (ref 70–110)
POIKILOCYTOSIS BLD QL SMEAR: SLIGHT
POLYCHROMASIA BLD QL SMEAR: ABNORMAL
POTASSIUM BLD-SCNC: 3.9 MMOL/L (ref 3.5–5.1)
POTASSIUM SERPL-SCNC: 3.9 MMOL/L (ref 3.5–5.1)
POTASSIUM SERPL-SCNC: 4.1 MMOL/L (ref 3.5–5.1)
POTASSIUM SERPL-SCNC: 4.1 MMOL/L (ref 3.5–5.1)
POTASSIUM SERPL-SCNC: 4.2 MMOL/L (ref 3.5–5.1)
POTASSIUM SERPL-SCNC: 4.4 MMOL/L (ref 3.5–5.1)
PROT SERPL-MCNC: 6.2 G/DL (ref 6–8.4)
PROT SERPL-MCNC: 6.2 G/DL (ref 6–8.4)
PROTEUS SPECIES: NOT DETECTED
PROTHROMBIN TIME: 12.4 SEC (ref 9–12.5)
PROTHROMBIN TIME: 12.7 SEC (ref 9–12.5)
PSEUDOMONAS AERUGINOSA: NOT DETECTED
RA MAJOR: 4.76 CM
RA PRESSURE ESTIMATED: 8 MMHG
RA WIDTH: 2.85 CM
RBC # BLD AUTO: 3.06 M/UL (ref 4–5.4)
RIGHT VENTRICULAR END-DIASTOLIC DIMENSION: 2.34 CM
RV TB RVSP: 11 MMHG
SALMONELLA SP: NOT DETECTED
SAMPLE: ABNORMAL
SERRATIA MARCESCENS: NOT DETECTED
SINUS: 3.28 CM
SITE: ABNORMAL
SODIUM BLD-SCNC: 138 MMOL/L (ref 136–145)
SODIUM SERPL-SCNC: 139 MMOL/L (ref 136–145)
SODIUM SERPL-SCNC: 141 MMOL/L (ref 136–145)
SODIUM SERPL-SCNC: 141 MMOL/L (ref 136–145)
SODIUM SERPL-SCNC: 142 MMOL/L (ref 136–145)
SODIUM SERPL-SCNC: 143 MMOL/L (ref 136–145)
STAPHYLOCOCCUS AUREUS: NOT DETECTED
STAPHYLOCOCCUS EPIDERMIDIS: DETECTED
STAPHYLOCOCCUS LUGDUNESIS: NOT DETECTED
STAPHYLOCOCCUS SPECIES: ABNORMAL
STENOTROPHOMONAS MALTOPHILIA: NOT DETECTED
STJ: 2.95 CM
STREPTOCOCCUS AGALACTIAE: NOT DETECTED
STREPTOCOCCUS PNEUMONIAE: NOT DETECTED
STREPTOCOCCUS PYOGENES: NOT DETECTED
STREPTOCOCCUS SPECIES: NOT DETECTED
TARGETS BLD QL SMEAR: ABNORMAL
TR MAX PG: 26 MMHG
TRICUSPID ANNULAR PLANE SYSTOLIC EXCURSION: 1.12 CM
TV REST PULMONARY ARTERY PRESSURE: 34 MMHG
VAN A/B (VRE GENE): ABNORMAL
VANCOMYCIN SERPL-MCNC: 16.7 UG/ML
VIM GENE (CARBAPENEM RESISTANT): ABNORMAL
WBC # BLD AUTO: 15.59 K/UL (ref 3.9–12.7)
Z-SCORE OF LEFT VENTRICULAR DIMENSION IN END DIASTOLE: -3.38
Z-SCORE OF LEFT VENTRICULAR DIMENSION IN END SYSTOLE: -2.06

## 2024-01-12 PROCEDURE — 94668 MNPJ CHEST WALL SBSQ: CPT

## 2024-01-12 PROCEDURE — 93005 ELECTROCARDIOGRAM TRACING: CPT

## 2024-01-12 PROCEDURE — C1779 LEAD, PMKR, TRANSVENOUS VDD: HCPCS | Performed by: INTERNAL MEDICINE

## 2024-01-12 PROCEDURE — 85014 HEMATOCRIT: CPT

## 2024-01-12 PROCEDURE — 84132 ASSAY OF SERUM POTASSIUM: CPT

## 2024-01-12 PROCEDURE — 99291 CRITICAL CARE FIRST HOUR: CPT | Mod: ,,, | Performed by: ANESTHESIOLOGY

## 2024-01-12 PROCEDURE — 25000003 PHARM REV CODE 250

## 2024-01-12 PROCEDURE — 83735 ASSAY OF MAGNESIUM: CPT | Mod: 91 | Performed by: SURGERY

## 2024-01-12 PROCEDURE — 85730 THROMBOPLASTIN TIME PARTIAL: CPT

## 2024-01-12 PROCEDURE — 99152 MOD SED SAME PHYS/QHP 5/>YRS: CPT | Mod: ,,, | Performed by: INTERNAL MEDICINE

## 2024-01-12 PROCEDURE — 25000003 PHARM REV CODE 250: Performed by: SURGERY

## 2024-01-12 PROCEDURE — 94640 AIRWAY INHALATION TREATMENT: CPT

## 2024-01-12 PROCEDURE — 99152 MOD SED SAME PHYS/QHP 5/>YRS: CPT | Performed by: INTERNAL MEDICINE

## 2024-01-12 PROCEDURE — 80069 RENAL FUNCTION PANEL: CPT | Performed by: NURSE PRACTITIONER

## 2024-01-12 PROCEDURE — 63600175 PHARM REV CODE 636 W HCPCS

## 2024-01-12 PROCEDURE — 63600175 PHARM REV CODE 636 W HCPCS: Performed by: INTERNAL MEDICINE

## 2024-01-12 PROCEDURE — 99233 SBSQ HOSP IP/OBS HIGH 50: CPT | Mod: ,,, | Performed by: INTERNAL MEDICINE

## 2024-01-12 PROCEDURE — 80162 ASSAY OF DIGOXIN TOTAL: CPT

## 2024-01-12 PROCEDURE — 85007 BL SMEAR W/DIFF WBC COUNT: CPT | Performed by: SURGERY

## 2024-01-12 PROCEDURE — 80202 ASSAY OF VANCOMYCIN: CPT | Performed by: STUDENT IN AN ORGANIZED HEALTH CARE EDUCATION/TRAINING PROGRAM

## 2024-01-12 PROCEDURE — 76937 US GUIDE VASCULAR ACCESS: CPT

## 2024-01-12 PROCEDURE — 25000003 PHARM REV CODE 250: Performed by: INTERNAL MEDICINE

## 2024-01-12 PROCEDURE — 80069 RENAL FUNCTION PANEL: CPT | Mod: 91 | Performed by: NURSE PRACTITIONER

## 2024-01-12 PROCEDURE — 80053 COMPREHEN METABOLIC PANEL: CPT | Mod: 91 | Performed by: STUDENT IN AN ORGANIZED HEALTH CARE EDUCATION/TRAINING PROGRAM

## 2024-01-12 PROCEDURE — 63600175 PHARM REV CODE 636 W HCPCS: Performed by: NURSE PRACTITIONER

## 2024-01-12 PROCEDURE — 85027 COMPLETE CBC AUTOMATED: CPT | Performed by: SURGERY

## 2024-01-12 PROCEDURE — 83735 ASSAY OF MAGNESIUM: CPT | Mod: 91 | Performed by: NURSE PRACTITIONER

## 2024-01-12 PROCEDURE — 94761 N-INVAS EAR/PLS OXIMETRY MLT: CPT

## 2024-01-12 PROCEDURE — 11000001 HC ACUTE MED/SURG PRIVATE ROOM

## 2024-01-12 PROCEDURE — 82330 ASSAY OF CALCIUM: CPT | Performed by: STUDENT IN AN ORGANIZED HEALTH CARE EDUCATION/TRAINING PROGRAM

## 2024-01-12 PROCEDURE — 33210 INSERT ELECTRD/PM CATH SNGL: CPT | Mod: ,,, | Performed by: INTERNAL MEDICINE

## 2024-01-12 PROCEDURE — 87040 BLOOD CULTURE FOR BACTERIA: CPT

## 2024-01-12 PROCEDURE — 25000242 PHARM REV CODE 250 ALT 637 W/ HCPCS

## 2024-01-12 PROCEDURE — 33210 INSERT ELECTRD/PM CATH SNGL: CPT

## 2024-01-12 PROCEDURE — 83735 ASSAY OF MAGNESIUM: CPT | Mod: 91 | Performed by: STUDENT IN AN ORGANIZED HEALTH CARE EDUCATION/TRAINING PROGRAM

## 2024-01-12 PROCEDURE — 84100 ASSAY OF PHOSPHORUS: CPT | Mod: 91 | Performed by: STUDENT IN AN ORGANIZED HEALTH CARE EDUCATION/TRAINING PROGRAM

## 2024-01-12 PROCEDURE — 82803 BLOOD GASES ANY COMBINATION: CPT

## 2024-01-12 PROCEDURE — 82800 BLOOD PH: CPT

## 2024-01-12 PROCEDURE — 27200667 HC PACEMAKER, TEMPORARY MONITORING, PER SHIFT

## 2024-01-12 PROCEDURE — 63600175 PHARM REV CODE 636 W HCPCS: Performed by: STUDENT IN AN ORGANIZED HEALTH CARE EDUCATION/TRAINING PROGRAM

## 2024-01-12 PROCEDURE — 63600175 PHARM REV CODE 636 W HCPCS: Mod: JZ,JG

## 2024-01-12 PROCEDURE — 82330 ASSAY OF CALCIUM: CPT

## 2024-01-12 PROCEDURE — 33210 INSERT ELECTRD/PM CATH SNGL: CPT | Performed by: INTERNAL MEDICINE

## 2024-01-12 PROCEDURE — 99900035 HC TECH TIME PER 15 MIN (STAT)

## 2024-01-12 PROCEDURE — 99223 1ST HOSP IP/OBS HIGH 75: CPT | Mod: ,,, | Performed by: INTERNAL MEDICINE

## 2024-01-12 PROCEDURE — 5A12012 PERFORMANCE OF CARDIAC OUTPUT, SINGLE, MANUAL: ICD-10-PCS | Performed by: ANESTHESIOLOGY

## 2024-01-12 PROCEDURE — C1894 INTRO/SHEATH, NON-LASER: HCPCS | Performed by: INTERNAL MEDICINE

## 2024-01-12 PROCEDURE — 83735 ASSAY OF MAGNESIUM: CPT | Performed by: NURSE PRACTITIONER

## 2024-01-12 PROCEDURE — 85730 THROMBOPLASTIN TIME PARTIAL: CPT | Mod: 91 | Performed by: STUDENT IN AN ORGANIZED HEALTH CARE EDUCATION/TRAINING PROGRAM

## 2024-01-12 PROCEDURE — 85610 PROTHROMBIN TIME: CPT | Mod: 91 | Performed by: STUDENT IN AN ORGANIZED HEALTH CARE EDUCATION/TRAINING PROGRAM

## 2024-01-12 PROCEDURE — 84295 ASSAY OF SERUM SODIUM: CPT

## 2024-01-12 PROCEDURE — 93010 ELECTROCARDIOGRAM REPORT: CPT | Mod: ,,, | Performed by: INTERNAL MEDICINE

## 2024-01-12 PROCEDURE — C1751 CATH, INF, PER/CENT/MIDLINE: HCPCS

## 2024-01-12 PROCEDURE — 37799 UNLISTED PX VASCULAR SURGERY: CPT

## 2024-01-12 PROCEDURE — 27000221 HC OXYGEN, UP TO 24 HOURS

## 2024-01-12 PROCEDURE — 84100 ASSAY OF PHOSPHORUS: CPT | Performed by: SURGERY

## 2024-01-12 PROCEDURE — 85730 THROMBOPLASTIN TIME PARTIAL: CPT | Mod: 91

## 2024-01-12 PROCEDURE — 85610 PROTHROMBIN TIME: CPT

## 2024-01-12 PROCEDURE — 80053 COMPREHEN METABOLIC PANEL: CPT | Performed by: SURGERY

## 2024-01-12 PROCEDURE — 5A1223Z PERFORMANCE OF CARDIAC PACING, CONTINUOUS: ICD-10-PCS | Performed by: INTERNAL MEDICINE

## 2024-01-12 RX ORDER — CEFAZOLIN SODIUM 1 G/3ML
INJECTION, POWDER, FOR SOLUTION INTRAMUSCULAR; INTRAVENOUS
Status: DISCONTINUED | OUTPATIENT
Start: 2024-01-12 | End: 2024-01-12 | Stop reason: HOSPADM

## 2024-01-12 RX ORDER — ATROPINE SULFATE 0.1 MG/ML
INJECTION INTRAVENOUS
Status: COMPLETED
Start: 2024-01-12 | End: 2024-01-12

## 2024-01-12 RX ORDER — SODIUM CHLORIDE 0.9 % (FLUSH) 0.9 %
10 SYRINGE (ML) INJECTION
Status: DISCONTINUED | OUTPATIENT
Start: 2024-01-12 | End: 2024-02-06 | Stop reason: HOSPADM

## 2024-01-12 RX ORDER — MIDAZOLAM HYDROCHLORIDE 1 MG/ML
INJECTION INTRAMUSCULAR; INTRAVENOUS
Status: COMPLETED
Start: 2024-01-12 | End: 2024-01-12

## 2024-01-12 RX ORDER — LIDOCAINE HYDROCHLORIDE 20 MG/ML
INJECTION, SOLUTION INFILTRATION; PERINEURAL
Status: DISCONTINUED | OUTPATIENT
Start: 2024-01-12 | End: 2024-01-12 | Stop reason: HOSPADM

## 2024-01-12 RX ORDER — HEPARIN SODIUM,PORCINE/D5W 25000/250
0-40 INTRAVENOUS SOLUTION INTRAVENOUS CONTINUOUS
Status: DISCONTINUED | OUTPATIENT
Start: 2024-01-12 | End: 2024-01-15

## 2024-01-12 RX ORDER — LIDOCAINE HYDROCHLORIDE 10 MG/ML
INJECTION, SOLUTION EPIDURAL; INFILTRATION; INTRACAUDAL; PERINEURAL
Status: DISPENSED
Start: 2024-01-12 | End: 2024-01-12

## 2024-01-12 RX ORDER — MIDAZOLAM HYDROCHLORIDE 1 MG/ML
INJECTION INTRAMUSCULAR; INTRAVENOUS
Status: DISCONTINUED | OUTPATIENT
Start: 2024-01-12 | End: 2024-01-12 | Stop reason: HOSPADM

## 2024-01-12 RX ORDER — HEPARIN SOD,PORCINE/0.9 % NACL 1000/500ML
INTRAVENOUS SOLUTION INTRAVENOUS
Status: DISCONTINUED | OUTPATIENT
Start: 2024-01-12 | End: 2024-01-12 | Stop reason: HOSPADM

## 2024-01-12 RX ORDER — SODIUM CHLORIDE 9 MG/ML
INJECTION, SOLUTION INTRAVENOUS
Status: DISCONTINUED | OUTPATIENT
Start: 2024-01-12 | End: 2024-02-06 | Stop reason: HOSPADM

## 2024-01-12 RX ORDER — FENTANYL CITRATE 50 UG/ML
INJECTION, SOLUTION INTRAMUSCULAR; INTRAVENOUS
Status: DISCONTINUED | OUTPATIENT
Start: 2024-01-12 | End: 2024-01-12 | Stop reason: HOSPADM

## 2024-01-12 RX ORDER — ATROPINE SULFATE 0.1 MG/ML
0.5 INJECTION INTRAVENOUS ONCE
Status: COMPLETED | OUTPATIENT
Start: 2024-01-12 | End: 2024-01-12

## 2024-01-12 RX ADMIN — ACETYLCYSTEINE 4 ML: 100 INHALANT RESPIRATORY (INHALATION) at 08:01

## 2024-01-12 RX ADMIN — INSULIN ASPART 2 UNITS: 100 INJECTION, SOLUTION INTRAVENOUS; SUBCUTANEOUS at 01:01

## 2024-01-12 RX ADMIN — ATROPINE SULFATE 0.5 MG: 0.1 INJECTION INTRAVENOUS at 08:01

## 2024-01-12 RX ADMIN — Medication 80 MG: at 05:01

## 2024-01-12 RX ADMIN — HEPARIN SODIUM 16 UNITS/KG/HR: 10000 INJECTION, SOLUTION INTRAVENOUS at 10:01

## 2024-01-12 RX ADMIN — INSULIN ASPART 2 UNITS: 100 INJECTION, SOLUTION INTRAVENOUS; SUBCUTANEOUS at 04:01

## 2024-01-12 RX ADMIN — METOCLOPRAMIDE HYDROCHLORIDE 5 MG: 10 INJECTION, SOLUTION INTRAMUSCULAR; INTRAVENOUS at 05:01

## 2024-01-12 RX ADMIN — ALBUTEROL SULFATE 2.5 MG: 2.5 SOLUTION RESPIRATORY (INHALATION) at 01:01

## 2024-01-12 RX ADMIN — METOCLOPRAMIDE HYDROCHLORIDE 5 MG: 10 INJECTION, SOLUTION INTRAMUSCULAR; INTRAVENOUS at 12:01

## 2024-01-12 RX ADMIN — ALBUTEROL SULFATE 2.5 MG: 2.5 SOLUTION RESPIRATORY (INHALATION) at 08:01

## 2024-01-12 RX ADMIN — HEPARIN SODIUM 14 UNITS/KG/HR: 10000 INJECTION, SOLUTION INTRAVENOUS at 09:01

## 2024-01-12 RX ADMIN — ERYTHROPOIETIN 4000 UNITS: 4000 INJECTION, SOLUTION INTRAVENOUS; SUBCUTANEOUS at 01:01

## 2024-01-12 RX ADMIN — PIPERACILLIN SODIUM AND TAZOBACTAM SODIUM 4.5 G: 4; .5 INJECTION, POWDER, FOR SOLUTION INTRAVENOUS at 09:01

## 2024-01-12 RX ADMIN — HEPARIN SODIUM 1000 UNITS: 1000 INJECTION, SOLUTION INTRAVENOUS; SUBCUTANEOUS at 04:01

## 2024-01-12 RX ADMIN — SODIUM CHLORIDE: 9 INJECTION, SOLUTION INTRAVENOUS at 06:01

## 2024-01-12 RX ADMIN — PIPERACILLIN SODIUM AND TAZOBACTAM SODIUM 4.5 G: 4; .5 INJECTION, POWDER, FOR SOLUTION INTRAVENOUS at 04:01

## 2024-01-12 RX ADMIN — METOCLOPRAMIDE HYDROCHLORIDE 5 MG: 10 INJECTION, SOLUTION INTRAMUSCULAR; INTRAVENOUS at 06:01

## 2024-01-12 RX ADMIN — ACETYLCYSTEINE 4 ML: 100 INHALANT RESPIRATORY (INHALATION) at 01:01

## 2024-01-12 RX ADMIN — METOCLOPRAMIDE HYDROCHLORIDE 5 MG: 10 INJECTION, SOLUTION INTRAMUSCULAR; INTRAVENOUS at 11:01

## 2024-01-12 NOTE — ASSESSMENT & PLAN NOTE
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 Stepped up to the unit for persistent afib rvr.       Neuro/Psych:   -- Sedation: none  -- Pain:  Lidocaine patch      Cards:   -- BP goals SBP >90, MAPs >55   - Off phenylephrine  -- Afib RVR   - Amio transitioned to PO, now held post cardiac arrest  -- Asystolic arrest   - morning of 1/12/24 patient went into asystole. Had multiple bradycardic events overnight which had self resolved. At time of asystole, nurse performed 1 min of chest compressions and achieved ROSC.EP notified and presented for STAT TVP placement. Access unable to be obtained due to central venous stenosis. 2nd episode of asystole. Resolved after minimal compressions. 3rd episode of asystole resolved with singular compression. Patient brought to Cath lab for TVP placed via groin.   - TVP placed @ back up rate of 70   - will likely require micra leadless PPM   - digoxin level high --> administering dig reversal     Pulm:   -- Goal O2 sat > 90%  -- Decreased O2 req to 2L, satting upper 90s. Likely could dc, but patient prefers having O2 on  -- Chest tube placed d/c'd 1/8/24  -- FU CXR-- pleural effusions 1/10   - CXR 1/11 showing RLL opacification      Renal:  -- history of ESRD, dialysis dependent.  Monday Wednesday Friday schedule while in the hospital.   - HD yesterday. 1.5L removed  -- last SLED on 1/8  -- trend BUN/Cr  -- patient is anuric  -- permacath in place last exchanged 10/2023  -- fistula unable to be used    Recent Labs   Lab 01/12/24  0001 01/12/24  0422 01/12/24  0631   BUN 29* 35* 34*   CREATININE 3.0* 3.5* 3.6*           FEN / GI:   -- CT on 1/8/24 demonstrating SBO  -- US on 1/8/24 showing distended gallbladder without signs of acute cholecystitis   - NPO  - NGT removed.  - Equivocal GGC 1/10, added scheduled reglan for gastric motility and suppository this morning. Will likely dc NGT today if  bowel movement   - BM 1/9. None since. Will add bowel reg on since no obstruction  -- Replace lytes as needed  -- Nutrition:  Sips, Dietician consult placed for complex dietary restriction      ID:   -- Tm: afebrile; WBC within normal limits  -- f/u cultures and MRSA swab  Recent Labs   Lab 01/10/24  0317 01/11/24  0358 01/12/24  0422   WBC 10.08 15.24* 15.59*       -- Abx none      Heme/Onc:  -- Daily CBC  -- INR 1.2 this am   -- on heparin gtt for A-fib  Recent Labs   Lab 01/10/24  0317 01/10/24  0812 01/11/24  0358 01/12/24  0422 01/12/24  0631   HGB 8.5*  --  9.0* 8.9*  --      --  300 312  --    APTT  --    < > 39.0* 33.9* 62.5*   INR 1.2  --  1.2 1.2 1.2    < > = values in this interval not displayed.           Endo:   -- history of diabetes  -- Gluc goal 140-180  -- SSI      PPx:   Feeding:  NPO  Analgesia/Sedation:  Pain is controlled, no sedation  Thromboembolic prevention: heparin restarted  HOB >30: yes  Stress Ulcer ppx: pantoprazole IV  Glucose control: Critical care goal 140-180 g/dl, SSI  Bowel reg: n/a SBO. NGT decompression  Invasive Lines/Drains/Airway: Permacath, PIV x2, TVP groin line  Deescalation: none        Dispo/Code Status/Palliative:   -- SICU/ Full Code

## 2024-01-12 NOTE — SIGNIFICANT EVENT
Pt asystole after xray. Compressions started immediately and obtained ROSC within 1min after compressions only. MD and charge notified.

## 2024-01-12 NOTE — PLAN OF CARE
SICU PLAN OF CARE NOTE    Dx: Malignant carcinoid tumor of bronchus    Goals of Care: MAP>55     Vital Signs (last 12 hours):   Temp:  [97.5 °F (36.4 °C)-98 °F (36.7 °C)]   Pulse:  []   Resp:  [20-37]   BP: (133-168)/(62-85)   SpO2:  [73 %-100 %]   Arterial Line BP: (138-170)/(54-67)      Neuro: AAO x4, Follows Commands, and Moves All Extremities    Cardiac: NSR    Respiratory: Nasal Cannula 1L    Gtts: Heparin    Urine Output: 1 unmeasured occurrence       Dialysis: HD, 1L pulled off     Diet: Clear Liquid     Labs/Accuchecks: Accuchecks Q4. Daily labs     Skin:  All skin remains free from injury.  Patient turned Q2, stephanie mattress inflated, and bed working correctly. Heel and sacral foams in place.     Shift Events:  Pt OOBTC x4 hrs. Walked in halls with PT. Bmx1, NGT dc. HD today. Amio gtt dc, PO started. Bradycardic down to 15, see progress note. Blood cultures (approved by Dr. Muñoz), urine cultures, respiratory cultures, and MRSA swab ordered. BC x1 collected. MRSA swab collected. Unable to collect second blood culture or urine culture. See flowsheet for further assessment/details. Family updated on current condition/plan of care, questions answered, and emotional support provided.

## 2024-01-12 NOTE — PT/OT/SLP PROGRESS
Physical Therapy      Patient Name:  Lily Green   MRN:  [id number]    Patient not seen today secondary to  (pt on hold due to TVP placement). Will follow-up at a later date.    1/12/2024  .

## 2024-01-12 NOTE — SUBJECTIVE & OBJECTIVE
Interval History/Significant Events: NAEON. Yesterday has a bowel movement, pulled NGT. She underwent HD removing 1.5L. Abx started for RLL opacification, cough, and WBC elevation. Eulogio to 15 yesterday after voiding. After assessment appears to be vasovagal event. Afebrile. Transitioned Medications PO except for AC until confident in gut absorption.     Update: Called for asystolic code. Nurse alerted by alarms of asystole, commenced chest compressions. ROSC achieved after 1 min of compressions. Patient Aox4 shortly after event. Patient repeated eulogio episodes and asystole requiring compression 2 additional times. Once while EP attempting to place TVP and again afterwards prior to going to cath lab.     Follow-up For: Procedure(s) (LRB):  LYMPHADENECTOMY (Right)  THORACOTOMY (Right)  THORACOTOMY, WITH INITIAL THERAPEUTIC WEDGE RESECTION OF LUNG (Right)    Post-Operative Day: 10 Days Post-Op    Objective:     Vital Signs (Most Recent):  Temp: 99 °F (37.2 °C) (01/11/24 2300)  Pulse: 103 (01/12/24 0417)  Resp: (!) 23 (01/12/24 0417)  BP: (!) 161/68 (01/11/24 1500)  SpO2: 97 % (01/12/24 0417) Vital Signs (24h Range):  Temp:  [97.4 °F (36.3 °C)-99 °F (37.2 °C)] 99 °F (37.2 °C)  Pulse:  [] 103  Resp:  [20-45] 23  SpO2:  [73 %-100 %] 97 %  BP: (102-168)/(62-85) 161/68  Arterial Line BP: (118-172)/(50-73) 144/64     Weight: 69 kg (152 lb 1.9 oz)  Body mass index is 25.31 kg/m².      Intake/Output Summary (Last 24 hours) at 1/12/2024 0574  Last data filed at 1/12/2024 0200  Gross per 24 hour   Intake 1293.63 ml   Output 1550 ml   Net -256.37 ml          Physical Exam  Constitutional:       Appearance: Normal appearance.   HENT:      Nose:      Comments: NGT in place  Cardiovascular:      Rate and Rhythm: Normal rate and regular rhythm.      Pulses: Normal pulses.   Pulmonary:      Effort: Pulmonary effort is normal.   Abdominal:      General: Abdomen is flat. There is no distension.      Palpations: Abdomen is soft.       Tenderness: There is no abdominal tenderness.      Comments: Soft, nontender abdomen   Genitourinary:     Comments: TVP wire in place  Skin:     General: Skin is warm and dry.   Neurological:      General: No focal deficit present.      Mental Status: She is alert.            Vents:  Oxygen Concentration (%): 40 (01/11/24 2215)    Lines/Drains/Airways       Central Venous Catheter Line  Duration                  Hemodialysis Catheter 01/25/23 1501 right internal jugular 351 days              Arterial Line  Duration             Arterial Line 01/08/24 1324 Right Radial 3 days              Peripheral Intravenous Line  Duration                  Hemodialysis AV Fistula 09/01/23 0800 Left forearm 132 days         Peripheral IV - Single Lumen 01/08/24 1108 22 G Posterior;Right Wrist 3 days         Peripheral IV - Single Lumen 01/08/24 1357 20 G Posterior;Right Forearm 3 days         Peripheral IV - Single Lumen 01/09/24 0542 20 G Anterior;Right Forearm 3 days                    Significant Labs:    CBC/Anemia Profile:  Recent Labs   Lab 01/11/24  0358 01/12/24  0422   WBC 15.24* 15.59*   HGB 9.0* 8.9*   HCT 26.2* 24.5*    312   MCV 84 80*   RDW 18.6* 18.1*        Chemistries:  Recent Labs   Lab 01/11/24  0358 01/11/24  1553 01/12/24  0001    144 142   K 5.1 3.4* 3.9    102 104   CO2 14* 28 21*   BUN 50* 18 29*   CREATININE 4.6* 2.0* 3.0*   CALCIUM 9.1 7.9* 8.0*   ALBUMIN 2.3* 2.3* 2.1*   PROT 6.3  --   --    BILITOT 0.9  --   --    ALKPHOS 285*  --   --    ALT 20  --   --    AST 44*  --   --    MG 2.8* 2.1 2.2   PHOS 4.5 2.0* 3.4       All pertinent labs within the past 24 hours have been reviewed.    Significant Imaging:  I have reviewed all pertinent imaging results/findings within the past 24 hours.

## 2024-01-12 NOTE — PROGRESS NOTES
[facility]  Critical Care - Surgery  Progress Note    Patient Name: Lily Green  MRN: [id number]  Admission Date: 1/2/2024  Hospital Length of Stay: 10 days  Code Status: Full Code  Attending Provider: Tan Thomson MD  Primary Care Provider: Pavel Calixto MD   Principal Problem: Malignant carcinoid tumor of bronchus    Subjective:     Interval History/Significant Events: NAEON. Yesterday has a bowel movement, pulled NGT. She underwent HD removing 1.5L. Abx started for RLL opacification, cough, and WBC elevation. Ra to 15 yesterday after voiding. After assessment appears to be vasovagal event. Afebrile. Transitioned Medications PO except for AC until confident in gut absorption.     Update: Called for asystolic code. Nurse alerted by alarms of asystole, commenced chest compressions. ROSC achieved after 1 min of compressions. Patient Aox4 shortly after event. Patient repeated ra episodes and asystole requiring compression 2 additional times. Once while EP attempting to place TVP and again afterwards prior to going to cath lab.     Follow-up For: Procedure(s) (LRB):  LYMPHADENECTOMY (Right)  THORACOTOMY (Right)  THORACOTOMY, WITH INITIAL THERAPEUTIC WEDGE RESECTION OF LUNG (Right)    Post-Operative Day: 10 Days Post-Op    Objective:     Vital Signs (Most Recent):  Temp: 99 °F (37.2 °C) (01/11/24 2300)  Pulse: 103 (01/12/24 0417)  Resp: (!) 23 (01/12/24 0417)  BP: (!) 161/68 (01/11/24 1500)  SpO2: 97 % (01/12/24 0417) Vital Signs (24h Range):  Temp:  [97.4 °F (36.3 °C)-99 °F (37.2 °C)] 99 °F (37.2 °C)  Pulse:  [] 103  Resp:  [20-45] 23  SpO2:  [73 %-100 %] 97 %  BP: (102-168)/(62-85) 161/68  Arterial Line BP: (118-172)/(50-73) 144/64     Weight: 69 kg (152 lb 1.9 oz)  Body mass index is 25.31 kg/m².      Intake/Output Summary (Last 24 hours) at 1/12/2024 0556  Last data filed at 1/12/2024 0200  Gross per 24 hour   Intake 1293.63 ml   Output 1550 ml   Net -256.37  ml          Physical Exam  Constitutional:       Appearance: Normal appearance.   HENT:      Nose:      Comments: NGT in place  Cardiovascular:      Rate and Rhythm: Normal rate and regular rhythm.      Pulses: Normal pulses.   Pulmonary:      Effort: Pulmonary effort is normal.   Abdominal:      General: Abdomen is flat. There is no distension.      Palpations: Abdomen is soft.      Tenderness: There is no abdominal tenderness.      Comments: Soft, nontender abdomen   Genitourinary:     Comments: TVP wire in place  Skin:     General: Skin is warm and dry.   Neurological:      General: No focal deficit present.      Mental Status: She is alert.            Vents:  Oxygen Concentration (%): 40 (01/11/24 2215)    Lines/Drains/Airways       Central Venous Catheter Line  Duration                  Hemodialysis Catheter 01/25/23 1501 right internal jugular 351 days              Arterial Line  Duration             Arterial Line 01/08/24 1324 Right Radial 3 days              Peripheral Intravenous Line  Duration                  Hemodialysis AV Fistula 09/01/23 0800 Left forearm 132 days         Peripheral IV - Single Lumen 01/08/24 1108 22 G Posterior;Right Wrist 3 days         Peripheral IV - Single Lumen 01/08/24 1357 20 G Posterior;Right Forearm 3 days         Peripheral IV - Single Lumen 01/09/24 0542 20 G Anterior;Right Forearm 3 days                    Significant Labs:    CBC/Anemia Profile:  Recent Labs   Lab 01/11/24  0358 01/12/24  0422   WBC 15.24* 15.59*   HGB 9.0* 8.9*   HCT 26.2* 24.5*    312   MCV 84 80*   RDW 18.6* 18.1*        Chemistries:  Recent Labs   Lab 01/11/24  0358 01/11/24  1553 01/12/24  0001    144 142   K 5.1 3.4* 3.9    102 104   CO2 14* 28 21*   BUN 50* 18 29*   CREATININE 4.6* 2.0* 3.0*   CALCIUM 9.1 7.9* 8.0*   ALBUMIN 2.3* 2.3* 2.1*   PROT 6.3  --   --    BILITOT 0.9  --   --    ALKPHOS 285*  --   --    ALT 20  --   --    AST 44*  --   --    MG 2.8* 2.1 2.2   PHOS 4.5  2.0* 3.4       All pertinent labs within the past 24 hours have been reviewed.    Significant Imaging:  I have reviewed all pertinent imaging results/findings within the past 24 hours.  Assessment/Plan:     Cardiac/Vascular  Atrial fibrillation with rapid ventricular response  72 yo female with 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 Stepped up to the unit for persistent afib rvr.       Neuro/Psych:   -- Sedation: none  -- Pain:  Lidocaine patch      Cards:   -- BP goals SBP >90, MAPs >55   - Off phenylephrine  -- Afib RVR   - Amio transitioned to PO, now held post cardiac arrest  -- Asystolic arrest   - morning of 1/12/24 patient went into asystole. Had multiple bradycardic events overnight which had self resolved. At time of asystole, nurse performed 1 min of chest compressions and achieved ROSC.EP notified and presented for STAT TVP placement. Access unable to be obtained due to central venous stenosis. 2nd episode of asystole. Resolved after minimal compressions. 3rd episode of asystole resolved with singular compression. Patient brought to Cath lab for TVP placed via groin.   - TVP placed @ back up rate of 70   - will likely require micra leadless PPM   - digoxin level high --> administering dig reversal     Pulm:   -- Goal O2 sat > 90%  -- Decreased O2 req to 2L, satting upper 90s. Likely could dc, but patient prefers having O2 on  -- Chest tube placed d/c'd 1/8/24  -- FU CXR-- pleural effusions 1/10   - CXR 1/11 showing RLL opacification      Renal:  -- history of ESRD, dialysis dependent.  Monday Wednesday Friday schedule while in the hospital.   - HD yesterday. 1.5L removed  -- last SLED on 1/8  -- trend BUN/Cr  -- patient is anuric  -- permacath in place last exchanged 10/2023  -- fistula unable to be used    Recent Labs   Lab 01/12/24  0001 01/12/24  0422 01/12/24  0631   BUN 29* 35* 34*   CREATININE 3.0* 3.5* 3.6*            FEN / GI:   -- CT on 1/8/24 demonstrating SBO  -- US on 1/8/24 showing distended gallbladder without signs of acute cholecystitis   - NPO  - NGT removed.  - Equivocal GGC 1/10, added scheduled reglan for gastric motility and suppository this morning. Will likely dc NGT today if bowel movement   - BM 1/9. None since. Will add bowel reg on since no obstruction  -- Replace lytes as needed  -- Nutrition:  Sips, Dietician consult placed for complex dietary restriction      ID:   -- Tm: afebrile; WBC within normal limits  -- f/u cultures and MRSA swab  Recent Labs   Lab 01/10/24  0317 01/11/24  0358 01/12/24  0422   WBC 10.08 15.24* 15.59*       -- Abx none      Heme/Onc:  -- Daily CBC  -- INR 1.2 this am   -- on heparin gtt for A-fib  Recent Labs   Lab 01/10/24  0317 01/10/24  0812 01/11/24  0358 01/12/24  0422 01/12/24  0631   HGB 8.5*  --  9.0* 8.9*  --      --  300 312  --    APTT  --    < > 39.0* 33.9* 62.5*   INR 1.2  --  1.2 1.2 1.2    < > = values in this interval not displayed.           Endo:   -- history of diabetes  -- Gluc goal 140-180  -- SSI      PPx:   Feeding:  NPO  Analgesia/Sedation:  Pain is controlled, no sedation  Thromboembolic prevention: heparin restarted  HOB >30: yes  Stress Ulcer ppx: pantoprazole IV  Glucose control: Critical care goal 140-180 g/dl, SSI  Bowel reg: n/a SBO. NGT decompression  Invasive Lines/Drains/Airway: Permacath, PIV x2, TVP groin line  Deescalation: none        Dispo/Code Status/Palliative:   -- SICU/ Full Code       Critical care was time spent personally by me on the following activities: development of treatment plan with patient or surrogate and bedside caregivers, discussions with consultants, evaluation of patient's response to treatment, examination of patient, ordering and performing treatments and interventions, ordering and review of laboratory studies, ordering and review of radiographic studies, pulse oximetry, re-evaluation of patient's condition.  This  critical care time did not overlap with that of any other provider or involve time for any procedures.     Prabhu Esqueda MD  Critical Care - Surgery  [facility]

## 2024-01-12 NOTE — ASSESSMENT & PLAN NOTE
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 Stepped up to the unit for persistent afib rvr.       Neuro/Psych:   -- Sedation: none  -- Pain:  Lidocaine patch      Cards:   -- BP goals SBP >90, MAPs >55   - Off phenylephrine  -- Afib RVR   - Amio transitioned to PO, now held post cardiac arrest  -- Asystolic arrest   - morning of 1/12/24 patient went into asystole. Had multiple bradycardic events overnight which had self resolved. At time of asystole, nurse performed 1 min of chest compressions and achieved ROSC.EP notified and presented for STAT TVP placement. Access unable to be obtained due to central venous stenosis. 2nd episode of asystole. Resolved after minimal compressions. 3rd episode of asystole resolved with singular compression. Patient brought to Cath lab for TVP placed via groin.   - TVP placed @ back up rate of 70   - will likely require micra leadless PPM   - digoxin level high --> administering digibind     Pulm:   -- Goal O2 sat > 90%  -- Decreased O2 req to 2L, satting upper 90s. Likely could dc, but patient prefers having O2 on  -- Chest tube placed d/c'd 1/8/24  -- FU CXR-- pleural effusions 1/10   - CXR 1/11 showing RLL opacification      Renal:  -- history of ESRD, dialysis dependent.  Monday Wednesday Friday schedule while in the hospital.   - HD yesterday. 1.5L removed  -- last SLED on 1/8  -- trend BUN/Cr  -- patient is anuric  -- permacath in place last exchanged 10/2023  -- fistula unable to be used    Recent Labs   Lab 01/12/24  0422 01/12/24  0631 01/12/24  1316   BUN 35* 34* 38*   CREATININE 3.5* 3.6* 4.0*           FEN / GI:   -- CT on 1/8/24 demonstrating SBO  -- US on 1/8/24 showing distended gallbladder without signs of acute cholecystitis   - NPO  - NGT removed.  - Equivocal GGC 1/10, added scheduled reglan for gastric motility and suppository this morning. Will likely dc NGT today if  bowel movement   - BM 1/9. None since. Will add bowel reg on since no obstruction  -- Replace lytes as needed  -- Nutrition:  Sips, Dietician consult placed for complex dietary restriction      ID:   -- Tm: afebrile; WBC within normal limits  -- f/u cultures and MRSA swab  Recent Labs   Lab 01/10/24  0317 01/11/24  0358 01/12/24  0422   WBC 10.08 15.24* 15.59*       -- Abx none      Heme/Onc:  -- Daily CBC  -- INR 1.2 this am   -- on heparin gtt for A-fib  Recent Labs   Lab 01/10/24  0317 01/10/24  0812 01/11/24  0358 01/12/24  0422 01/12/24  0631 01/12/24  1316   HGB 8.5*  --  9.0* 8.9*  --   --      --  300 312  --   --    APTT  --    < > 39.0* 33.9* 62.5* 24.9   INR 1.2  --  1.2 1.2 1.2  --     < > = values in this interval not displayed.           Endo:   -- history of diabetes  -- Gluc goal 140-180  -- SSI      PPx:   Feeding:  NPO  Analgesia/Sedation:  Pain is controlled, no sedation  Thromboembolic prevention: heparin restarted  HOB >30: yes  Stress Ulcer ppx: pantoprazole IV  Glucose control: Critical care goal 140-180 g/dl, SSI  Bowel reg: n/a SBO. NGT decompression  Invasive Lines/Drains/Airway: Permacath, PIV x2, TVP groin line  Deescalation: none        Dispo/Code Status/Palliative:   -- SICU/ Full Code

## 2024-01-12 NOTE — CONSULTS
"[facility]  Cardiac Electrophysiology  Consult Note    Admission Date: 2024  Code Status: Full Code   Attending Provider: Tan Thomson MD  Consulting Provider: Connor M Gillies, MD  Principal Problem:Malignant carcinoid tumor of bronchus    Inpatient consult to Electrophysiology  Consult performed by: Gillies, Connor M, MD  Consult ordered by: Brooklyn Sapp MD        Subjective:     Chief Complaint:  Atrial fibrillation     HPI:   Lily Green (Sue) is a 73yoF with HTN, HLD, DM, Celiac's disease, and carcinoid tumor of the bronchus, ESRD with AV fistula who is admitted s/p wedge resection of the carcinoid tumor. Post op course was complicated by atrial fibrillation that was difficult to control, and he was stepped back up to the SICU for RVR. Originally cardiology gave recommendations of BB, amiodarone, and digoxin after escalating rate controlling agents. These rate controlling agents were de-escalated over the past few days with some occasional need for amiodarone re-loading by primary team. She has been off digoxin since  and metoprolol pushes have been given as needed up until yesterday. No PO metoprolol in several days. Overnight she was in atrial fibrillation, rate controlled, and had several bradycardic events where her heart rate dropped to the 15-25 BPM range. She maintained a pulse for these, but in the early AM several times this happened with a flat waveform on arterial line. EP was consulted for bradycardic/asystolic events in the setting of atrial fibrillation.     Past Medical History:   Diagnosis Date    Anxiety     Celiac disease 2018    Celiac disease     Depression     Diabetes mellitus     Family history of breast cancer in mother      at age 68    Hyperlipidemia     Hypertension     Meniere disease     Meniere's disease, unspecified ear     Menopause     Peptic ulcer     Reflux esophagitis     Urinary tract infection     Vaginal infection     " Vaginal Pap smear 04/07/2014    Pap/Hpv Negative        Past Surgical History:   Procedure Laterality Date    APPENDECTOMY      BREAST SURGERY      Tags    CARPAL TUNNEL RELEASE Bilateral 09/2017    ,     CLOSURE, COLOSTOMY Left 1/16/2023    Procedure: CLOSURE, COLOSTOMY;  Surgeon: Manuel Javier MD;  Location: [facility] OR;  Service: General;  Laterality: Left;    COLONOSCOPY  04/2018    Normal  ([place] A)     COLOSTOMY Right 1/16/2023    Procedure: CREATION, COLOSTOMY;  Surgeon: Manuel Javier MD;  Location: [facility] OR;  Service: General;  Laterality: Right;    DILATION AND CURETTAGE OF UTERUS  1986    DUPUYTREN CONTRACTURE RELEASE Bilateral 09/2017    HYSTERECTOMY  1987    BEAU w/ appy, no BSO     INJECTION OF NEUROLYTIC AGENT AROUND LUMBAR SYMPATHETIC NERVE N/A 1/6/2022    Procedure: BLOCK, LUMBAR SYMPATHETIC;  Surgeon: Souleymane Rizo Jr., MD;  Location: [facility] PAIN MGT;  Service: Pain Management;  Laterality: N/A;  no pacemaker   pt is diabetic     INJECTION OF NEUROLYTIC AGENT AROUND LUMBAR SYMPATHETIC NERVE N/A 8/25/2022    Procedure: BLOCK, LUMBAR SYMPATHETIC;  Surgeon: Souleymane Rizo Jr., MD;  Location: [facility] PAIN MGT;  Service: Pain Management;  Laterality: N/A;  diabetic     INSERTION OF TUNNELED CENTRAL VENOUS HEMODIALYSIS CATHETER Right 1/25/2023    Procedure: INSERTION, CATHETER, HEMODIALYSIS, DUAL LUMEN;  Surgeon: Manuel Javier MD;  Location: [facility] OR;  Service: General;  Laterality: Right;    INSERTION, CATHETER, TUNNELED Left 1/28/2023    Procedure: Insertion,catheter,tunneled;  Surgeon: Watson Fontenot MD;  Location: [facility] OR;  Service: General;  Laterality: Left;    LAPAROTOMY, EXPLORATORY N/A 1/14/2023    Procedure: LAPAROTOMY, EXPLORATORY;  Surgeon: Manuel Javier MD;  Location: [facility] OR;  Service: General;  Laterality: N/A;    LAPAROTOMY, EXPLORATORY N/A 1/16/2023    Procedure: LAPAROTOMY, EXPLORATORY;  Surgeon: Manuel Javier MD;  Location: [facility] OR;  Service: General;   Laterality: N/A;    LYMPHADENECTOMY Right 1/2/2024    Procedure: LYMPHADENECTOMY;  Surgeon: Tan Thomson MD;  Location: [facility] OR 2ND FLR;  Service: Cardiothoracic;  Laterality: Right;    REMOVAL OF CATHETER Right 1/28/2023    Procedure: REMOVAL, CATHETER;  Surgeon: Watson Fontenot MD;  Location: [facility] OR;  Service: General;  Laterality: Right;    REMOVAL, TUNNELED CATH Right 1/25/2023    Procedure: REMOVAL, TUNNELED CATH;  Surgeon: Manuel Javier MD;  Location: [facility] OR;  Service: General;  Laterality: Right;    ROBOTIC BRONCHOSCOPY N/A 10/20/2023    Procedure: ROBOTIC BRONCHOSCOPY;  Surgeon: Mayda Monzon MD;  Location: [facility] OR 2ND FLR;  Service: Pulmonary;  Laterality: N/A;    SHOULDER SURGERY  2009 & 2010    right rotator cuff    THORACOTOMY Right 1/2/2024    Procedure: THORACOTOMY;  Surgeon: Tan Thomson MD;  Location: [facility] OR [facility] FLR;  Service: Cardiothoracic;  Laterality: Right;    THORACOTOMY, WITH INITIAL THERAPEUTIC WEDGE RESECTION OF LUNG Right 1/2/2024    Procedure: THORACOTOMY, WITH INITIAL THERAPEUTIC WEDGE RESECTION OF LUNG;  Surgeon: Tan Thomson MD;  Location: [facility] OR [facility] FLR;  Service: Cardiothoracic;  Laterality: Right;    TONSILLECTOMY      UPPER GASTROINTESTINAL ENDOSCOPY  04/2018       Review of patient's allergies indicates:   Allergen Reactions    Crestor [rosuvastatin] Swelling    Gluten protein        No current facility-administered medications on file prior to encounter.     Current Outpatient Medications on File Prior to Encounter   Medication Sig    atorvastatin (LIPITOR) 40 MG tablet Take 1 tablet (40 mg total) by mouth once daily. (Patient taking differently: Take 40 mg by mouth every evening.)    epoetin noble-epbx (RETACRIT) 10,000 unit/mL imjection Inject 1 mL (10,000 Units total) into the skin every Mon, Wed, Fri. HOLD IF HEMOGLOBIN is 10 or GREATER    DO NOT SHAKE  DO NOT DILUTE  DO NOT MIX with other drug solutions    ferrous gluconate (FERGON) 324 MG tablet  "Take 1 tablet (324 mg total) by mouth daily with breakfast.    meclizine (ANTIVERT) 25 mg tablet Take 1 tablet (25 mg total) by mouth 3 (three) times daily as needed for Dizziness.    melatonin (MELATIN) 3 mg tablet Take 3 tablets (9 mg total) by mouth nightly as needed for Insomnia.    pantoprazole (PROTONIX) 40 MG tablet Take 1 tablet (40 mg total) by mouth once daily.    patiromer calcium sorbitex (VELTASSA) 8.4 gram PwPk Take 2 packets (16.8 g total) by mouth once daily.    sevelamer HCL (RENAGEL) 800 MG Tab Take 2 tablets (1,600 mg total) by mouth 3 (three) times daily with meals.    B complex-vitamin C-folic acid (MELLISSA-BENJY) 0.8 mg Tab Take 1 tablet (0.8 mg total) by mouth once daily.    betahistine HCl (BETAHISTINE, BULK, MISC)     blood sugar diagnostic (TRUE METRIX GLUCOSE TEST STRIP) Strp USE ONE STRIP FOR TESTING 2 TIMES A DAY    blood-glucose meter kit Use to test twice a day    calcium-vitamin D3 (OS-CIPRIANO 500 + D3) 500 mg-5 mcg (200 unit) per tablet Take 1 tablet by mouth once daily.    coenzyme Q10 100 mg capsule     dicyclomine (BENTYL) 10 MG capsule     dronabinoL (MARINOL) 5 MG capsule Take 1 capsule (5 mg total) by mouth 2 (two) times daily before meals.    econazole nitrate 1 % cream Apply topically 2 (two) times daily.    insulin aspart U-100 (NOVOLOG) 100 unit/mL (3 mL) InPn pen Inject before meals:  150-200=+1, 201-250=+2, 251-300=+3; 301-350=+4, over 350=+5 units    lancets Misc 1 lancet by Misc.(Non-Drug; Combo Route) route 4 (four) times daily.    mometasone (ELOCON) 0.1 % ointment Apply topically.    neomycin (MYCIFRADIN) 500 mg Tab     nutritional supplements Liqd Take 237 mLs by mouth 4 (four) times daily.    oxyCODONE (ROXICODONE) 5 MG immediate release tablet Take 5 mg by mouth every 4 (four) hours as needed.    pen needle, diabetic (BD ULTRA-FINE MARIS PEN NEEDLE) 32 gauge x 5/32" Ndle 1 Device by Misc.(Non-Drug; Combo Route) route 4 (four) times daily with meals and nightly.    " pregabalin (LYRICA) 150 MG capsule TAKE ONE CAPSULE BY MOUTH 2 TIMES A DAY    pregabalin (LYRICA) 75 MG capsule Take 1 capsule (75 mg total) by mouth Daily. Give after HD    simethicone (MYLICON) 125 mg Cap capsule Take by mouth.    vit C,M-Xs-uzrgz-lutein-zeaxan (PRESERVISION AREDS 2) 861-376-35-1 mg-unit-mg-mg Cap     vitamins  A,C,E-zinc-copper 14,[id number] unit-mg-unit Cap Take 1 tablet by mouth once daily.     Family History       Problem Relation (Age of Onset)    Arthritis Father    Breast cancer Mother, Paternal Aunt    Cancer Mother, Paternal Aunt    Diabetes Paternal Grandfather    Hyperlipidemia Sister    Vision loss Father, Mother, Sister          Tobacco Use    Smoking status: Never    Smokeless tobacco: Never   Substance and Sexual Activity    Alcohol use: No    Drug use: Never    Sexual activity: Not Currently     Partners: Male     Birth control/protection: See Surgical Hx     Comment: :      Review of Systems   Constitutional: Negative for diaphoresis and fever.   Cardiovascular:  Negative for chest pain, dyspnea on exertion, leg swelling, near-syncope, orthopnea, palpitations, paroxysmal nocturnal dyspnea and syncope.   Respiratory:  Negative for cough and shortness of breath.    Gastrointestinal:  Negative for abdominal pain, diarrhea, nausea and vomiting.   Neurological:  Negative for light-headedness.   Psychiatric/Behavioral:  Negative for altered mental status and substance abuse.      Objective:     Vital Signs (Most Recent):  Temp: 97.7 °F (36.5 °C) (01/12/24 1500)  Pulse: 110 (01/12/24 1500)  Resp: (!) 25 (01/12/24 1500)  BP: 138/89 (01/12/24 1300)  SpO2: 99 % (01/12/24 1500) Vital Signs (24h Range):  Temp:  [97.7 °F (36.5 °C)-99 °F (37.2 °C)] 97.7 °F (36.5 °C)  Pulse:  [0-172] 110  Resp:  [20-50] 25  SpO2:  [76 %-100 %] 99 %  BP: (104-138)/(61-89) 138/89  Arterial Line BP: (106-172)/(26-76) 129/68       Weight: 68.9 kg (152 lb)  Body mass index is 25.29 kg/m².    SpO2: 99 %         Physical Exam  Constitutional:       Appearance: Normal appearance. She is ill-appearing.   Cardiovascular:      Rate and Rhythm: Normal rate. Rhythm irregular.      Pulses: Normal pulses.      Heart sounds: Normal heart sounds.   Pulmonary:      Effort: Pulmonary effort is normal.      Breath sounds: Normal breath sounds. No wheezing or rales.   Musculoskeletal:      Right lower leg: No edema.      Left lower leg: No edema.   Skin:     General: Skin is warm and dry.   Neurological:      Mental Status: She is alert and oriented to person, place, and time.            Significant Labs: All pertinent lab results from the last 24 hours have been reviewed.    Significant Imaging:  Reviewed     CHADS2 for A-FIB Stroke Risk  Age?: 65-74 years old  CHF history: No  HTN history: Yes  Previous Stroke Sx or TIA: No  Vascular Disease History?: No  Diabetes Mellitus History: Yes  Female?: Yes  [id number] Total Score: 4      Assessment and Plan:     Atrial fibrillation  #Bradycardia    73yoF with ESRD admitted for bronchial tumor removal with post op course complicated by atrial fibrillation. She had difficult to control rates but further post-operatively, her rates improved so was weaned off rate controlling agents. Overnight 1/11-1/12 and morning of 1/12 she had several bradycardic episodes in the setting of atrial fibrillation, recent digoxin use (reportedly renally dosed, but some high levels with ESRD).    In the setting of asystolic events with several short rounds of compressions (no more than 1 round at a time), TVP was attempted. 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     Recommendations:  - TVP  - Dig levels in the setting of ESRD  - Would try Digifab. Low downside from a toxicity standpoint and her syndrome could be in the setting of digoxin toxicity  - We will follow over the weekend  and will place PPM if needed early next week  - Okay to use AV pérez blocking agents if needed for RVR, but would stay with short acting IV BB pushes so we can continue to monitor her underlying AV pérez activity  - Continue anticoagulation        Thank you for your consult. I will follow-up with patient. Please contact us if you have any additional questions.    Connor M Gillies, MD  Cardiac Electrophysiology  [facility]

## 2024-01-12 NOTE — PLAN OF CARE
Patient with h/o malignant carcinoid tumor of the bronchus s/p thoracotomy and resection with mediastinal LN, ESRD, DMII for whom IC has been consulted on for emergent femoral TVP placement.  Pt's hospital course has been c/b afib with rvr for which amidoarone gtt was initiated with conversion to po a day ago. Yesterday pt had several episodes of bradycardia that had appeared to be vagally mediated.  This AM after xray was obtained and patient was being moved, she went to asystole briefly. Rosc achieved after compressions X1 round.  EP consulted. TVP placement via RIJ was attempted however unsuccessful.    Will take patient to cath lab for emergent femoral TVP.  Spoke with patient and pt's .  Consents obtained.    Josy Mcmullen MD  Cardiovascular medicine; PGY5  [facility]  [address] [place] [address]

## 2024-01-12 NOTE — PROGRESS NOTES
Pharmacokinetic Assessment Follow Up: IV Vancomycin    Vancomycin Regimen Assessment & Plan  - Vancomycin level drawn with morning labs today resulted as 16.7 mcg/mL, goal trough 10-20 mcg/mL.  - Nephrology following for ESRD/RRT, last received HD on 1/11. Plans for no RRT today per discussion with nephrology. Will continue post-HD dosing.  - No need for vancomycin dose today given level and plans for no RRT today. Pharmacy to use today's level to re-dose as needed post-HD.    Drug levels (last 3 results):  Recent Labs   Lab Result Units 01/12/24  0422   Vancomycin, Random ug/mL 16.7     Pharmacy will continue to follow and monitor vancomycin.    Please contact pharmacy at extension [phone] for questions regarding this assessment.    Thank you for the consult,   Joshua Harry     Patient brief summary:  Lily Green is a 73 y.o. female initiated on antimicrobial therapy with IV Vancomycin for treatment of lower respiratory infection    Drug Allergies:   Review of patient's allergies indicates:   Allergen Reactions    Crestor [rosuvastatin] Swelling    Gluten protein      Actual Body Weight:   68.9 kg    Renal Function:   Estimated Creatinine Clearance: 13.6 mL/min (A) (based on SCr of 3.6 mg/dL (H)).,     Dialysis Method (if applicable):  intermittent HD

## 2024-01-12 NOTE — ASSESSMENT & PLAN NOTE
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 On HD ~ 1 year. Last HD prior to presentation 1/1/24. Pt has NELY AVF placed on 10/23 that has not been cleared by vascular. Currently using RIJ TDC.     Nephrology History  iHD Schedule: TTS   Unit/MD: [facility]/ Dr. Vyas  Duration: 3.5 hours   EDW: 58 kg   Access: RIJ TDC  Residual Renal Function: minimal     Assessment:     -No further plans for RRT today  -Will eval RRT needs daily   -Epo with HD  - Renal diet, if not NPO   - Continue to monitor intake and output, daily weights   - Avoid nephrotoxic medication and renal dose medications to GFR

## 2024-01-12 NOTE — PLAN OF CARE
Please merge this note with today's resident critical care note.    SICU Staff Addendum  I have reviewed and concur with the resident's/NP's history, physical, assessment, and plan.  I have personally interviewed and examined the patient at bedside.  See below for any additional findings/changes.    Reason for admission:  Malignant carcinoid tumor of bronchus  Present on Admission:   ESRD (end stage renal disease)   Type 2 diabetes mellitus with diabetic polyneuropathy, without long-term current use of insulin   Malignant carcinoid tumor of bronchus   Anuria   A-V fistula      Active issues/Goals for Today:     72 y/o F now POD 10 s/p RML wedge resection for RML carcinoid tumor. Post-op course complicated by AF with RVR requiring ICU admission on 1/5 and SBO. 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     - Pain is controlled on current regimen. Minimizing opioids in setting of SBO.  - Tachy-eulogio syndrome s/p emergent TVP placement. EP recs noted and appreciated.  - Wean supplemental oxygen as able.  - SBO is slowly resolving. Would suggest advancing to a CLD today.  - ESRD. Intermittent HD as per nephrology.  - Heparin drip in the setting of AF.  - PT/OT  - OOB.  - Dispo : SICU until PPM is placed.     70 minutes of critical care time was spent personally by me on the following activities: development of treatment plan with patient or surrogate and bedside caregivers, discussions with consultants, evaluation of patient's response to treatment, examination of patient, ordering and performing treatments and interventions, ordering and review of laboratory studies, ordering and review of radiographic studies, pulse oximetry, re-evaluation of patient's condition.  This critical care time did not overlap with that of any other provider or involve time for any procedures.      Wayne Landon MD  Anesthesiology/Critical Care

## 2024-01-12 NOTE — NURSING
Cathlab RN at bedside and updated on patient.  Pt disconnected from wall monitor and Wall O2, and connected to portable monitor and Portable O2 Tank. Pt transported by cathlab RN and myself to cathlab on transport monitor with transcutaneous pacing pads in place and defibrillator available.  No acute events during transport.

## 2024-01-12 NOTE — HPI
"Lily Green (Sue) is a 73yoF with HTN, HLD, DM, Celiac's disease, and carcinoid tumor of the bronchus, ESRD with AV fistula who is admitted s/p wedge resection of the carcinoid tumor. Post op course was complicated by atrial fibrillation that was difficult to control, and he was stepped back up to the SICU for RVR. Originally cardiology gave recommendations of BB, amiodarone, and digoxin after escalating rate controlling agents. These rate controlling agents were de-escalated over the past few days with some occasional need for amiodarone re-loading by primary team. She has been off digoxin since 1/8 and metoprolol pushes have been given as needed up until yesterday. No PO metoprolol in several days. Overnight she was in atrial fibrillation, rate controlled, and had several bradycardic events where her heart rate dropped to the 15-25 BPM range. She maintained a pulse for these, but in the early AM several times this happened with a flat waveform on arterial line. EP was consulted for bradycardic/asystolic events in the setting of atrial fibrillation.   "

## 2024-01-12 NOTE — PROGRESS NOTES
[facility]  Nephrology  Progress Note    Patient Name: Lily Green  MRN: [id number]  Admission Date: 1/2/2024  Hospital Length of Stay: 10 days  Attending Provider: Tan Thomson MD   Primary Care Physician: Pavel Calixto MD  Principal Problem:Malignant carcinoid tumor of bronchus    Subjective:     Interval History: HD yesterday. UF 1L. Following HD patient had several episodes of bradycardia. Appears to have been vagally mediated. This morning while moving patient she went into asystole. ROSC achieved after 1 round of CPR. Plan for TVP placement in cathlab today. Net -224.     Review of patient's allergies indicates:   Allergen Reactions    Crestor [rosuvastatin] Swelling    Gluten protein      Current Facility-Administered Medications   Medication Frequency    0.9%  NaCl infusion Once    acetylcysteine 100 mg/ml (10%) solution 4 mL TID WAKE    amiodarone 360 mg/200 mL (1.8 mg/mL) infusion Continuous    bisacodyL suppository 10 mg Daily PRN    dextrose 10% bolus 125 mL 125 mL PRN    dextrose 10% bolus 250 mL 250 mL PRN    epoetin noble injection 4,000 Units Every Mon, Wed, Fri    glucagon (human recombinant) injection 1 mg PRN    glucose chewable tablet 16 g PRN    glucose chewable tablet 24 g PRN    heparin 25,000 units in dextrose 5% (100 units/ml) IV bolus from bag - ADDITIONAL PRN BOLUS - 30 units/kg PRN    heparin 25,000 units in dextrose 5% (100 units/ml) IV bolus from bag - ADDITIONAL PRN BOLUS - 60 units/kg PRN    heparin 25,000 units in dextrose 5% 250 mL (100 units/mL) infusion LOW INTENSITY nomogram - [facility] Continuous    hydrALAZINE injection 10 mg Q6H PRN    insulin aspart U-100 pen 0-10 Units Q4H PRN    LIDOcaine 5 % patch 1 patch Q24H    metoclopramide injection 5 mg Q6H    metoprolol injection 10 mg Q6H    ondansetron injection 8 mg Q8H PRN    pantoprazole injection 40 mg Daily       Objective:     Vital Signs (Most Recent):  Temp: 97.4 °F (36.3 °C) (01/11/24  0700)  Pulse: 69 (01/11/24 1200)  Resp: (!) 26 (01/11/24 1200)  BP: (!) 149/68 (01/11/24 1200)  SpO2: (!) 91 % (01/11/24 1200) Vital Signs (24h Range):  Temp:  [97.4 °F (36.3 °C)-98.3 °F (36.8 °C)] 97.4 °F (36.3 °C)  Pulse:  [] 69  Resp:  [20-51] 26  SpO2:  [73 %-100 %] 91 %  BP: (102-168)/(53-89) 149/68  Arterial Line BP: (110-183)/(50-67) 168/62     Weight: 69 kg (152 lb 1.9 oz) (01/11/24 0549)  Body mass index is 25.31 kg/m².  Body surface area is 1.78 meters squared.    I/O last 3 completed shifts:  In: 1810.8 [I.V.:1673.9; IV Piggyback:136.9]  Out: 50 [Drains:50]     Physical Exam  Vitals and nursing note reviewed.   HENT:      Head: Normocephalic.      Nose:      Comments: NGT with dark feculent output, slightly thinner than yesterday   Cardiovascular:      Rate and Rhythm: Normal rate and regular rhythm.      Comments: Right radial a line   Pulmonary:      Effort: Pulmonary effort is normal. No respiratory distress.      Comments: Right posterior chest incision c/d/I    Abdominal:      General: Abdomen is flat. There is no distension.      Palpations: Abdomen is soft. There is no mass.      Tenderness: There is no abdominal tenderness.      Hernia: No hernia is present.      Comments: Soft, nondistended  Nontender    Neurological:      General: No focal deficit present.      Mental Status: She is alert and oriented to person, place, and time.   Psychiatric:         Mood and Affect: Mood normal.         Behavior: Behavior normal.          Significant Labs:  CBC:   Recent Labs   Lab 01/11/24  0358   WBC 15.24*   RBC 3.11*   HGB 9.0*   HCT 26.2*      MCV 84   MCH 28.9   MCHC 34.4     CMP:   Recent Labs   Lab 01/11/24  0358   *   CALCIUM 9.1   ALBUMIN 2.3*   PROT 6.3      K 5.1   CO2 14*      BUN 50*   CREATININE 4.6*   ALKPHOS 285*   ALT 20   AST 44*   BILITOT 0.9     All labs within the past 24 hours have been reviewed.       Assessment/Plan:     Renal/  ESRD (end stage renal  disease)  S/p R thoracotomy due to RML carcinoid tumor. On HD ~ 1 year. Last HD prior to presentation 1/1/24. Pt has NELY AVF placed on 10/23 that has not been cleared by vascular. Currently using RIJ TDC.     Nephrology History  iHD Schedule: TTS   Unit/MD: [facility]/ Dr. Vyas  Duration: 3.5 hours   EDW: 58 kg   Access: RIJ TDC  Residual Renal Function: minimal     Assessment:     -No further plans for RRT today  -Will eval RRT needs daily   -Epo with HD  - Renal diet, if not NPO   - Continue to monitor intake and output, daily weights   - Avoid nephrotoxic medication and renal dose medications to GFR    Oncology  * Malignant carcinoid tumor of bronchus  -management per primary         Thank you for your consult. I will follow-up with patient. Please contact us if you have any additional questions.    Christy Gaytan DNP  Nephrology  [facility]

## 2024-01-12 NOTE — CARE UPDATE
SICU PLAN OF CARE NOTE    Dx: Malignant carcinoid tumor of bronchus    Goals of Care: MAP>55     Vital Signs (last 12 hours):   Temp:  [97.7 °F (36.5 °C)-99 °F (37.2 °C)]   Pulse:  [0-168]   Resp:  [20-45]   SpO2:  [88 %-100 %]   Arterial Line BP: (115-172)/(26-76)      Neuro: AAO x4, Follows Commands, and Moves All Extremities    Cardiac: A-fib    Respiratory: Nasal Cannula    Gtts: Heparin     Labs/Accuchecks: Ptt 33.9    Skin:  All skin remains free from injury.  Patient turned Q2,  mattress inflated, and bed working correctly.    Shift Events:  Multiple bradycardic events throughout the night with HR as low as 13 bpm. At approximately 0620, patient is asystole. Compressions immediatley began and ROSC is achieved. MD and charge notified.   See flowsheet for further assessment/details.  Family updated on current condition/plan of care, questions answered, and emotional support provided.  MD updated on current condition, vitals, labs, and gtts.  No new orders received, will continue to monitor.

## 2024-01-12 NOTE — PROGRESS NOTES
[facility]  Critical Care - Surgery  Progress Note    Patient Name: Lily Green  MRN: [id number]  Admission Date: 1/2/2024  Hospital Length of Stay: 10 days  Code Status: Full Code  Attending Provider: Tan Thomson MD  Primary Care Provider: Pavel Calixto MD   Principal Problem: Malignant carcinoid tumor of bronchus    Subjective:       Interval History/Significant Events: NAEON. Yesterday has a bowel movement, pulled NGT. She underwent HD removing 1.5L. Abx started for RLL opacification, cough, and WBC elevation. Ra to 15 yesterday after voiding. After assessment appears to be vasovagal event. Afebrile. Transitioned Medications PO except for AC until confident in gut absorption.      Update: Called for asystolic code. Nurse alerted by alarms of asystole, commenced chest compressions. ROSC achieved after 1 min of compressions. Patient Aox4 shortly after event. Patient repeated ra episodes and asystole requiring compression 2 additional times. Once while EP attempting to place TVP and again afterwards prior to going to cath lab.     Follow-up For: Procedure(s) (LRB):  Pacemaker, Temporary, Transvenous (Right)    Post-Operative Day: Day of Surgery    Objective:     Vital Signs (Most Recent):  Temp: 98.6 °F (37 °C) (01/12/24 1121)  Pulse: (!) 119 (01/12/24 1400)  Resp: (!) 38 (01/12/24 1400)  BP: 138/89 (01/12/24 1300)  SpO2: 98 % (01/12/24 1400) Vital Signs (24h Range):  Temp:  [97.7 °F (36.5 °C)-99 °F (37.2 °C)] 98.6 °F (37 °C)  Pulse:  [0-172] 119  Resp:  [20-50] 38  SpO2:  [76 %-100 %] 98 %  BP: (104-138)/(61-89) 138/89  Arterial Line BP: (106-172)/(26-76) 124/60     Weight: 68.9 kg (152 lb)  Body mass index is 25.29 kg/m².      Intake/Output Summary (Last 24 hours) at 1/12/2024 6869  Last data filed at 1/12/2024 1400  Gross per 24 hour   Intake 606.18 ml   Output --   Net 606.18 ml          Physical Exam  Constitutional:       Appearance: Normal appearance.    HENT:      Nose:      Comments: NGT in place  Cardiovascular:      Rate and Rhythm: Normal rate and regular rhythm.      Pulses: Normal pulses.   Pulmonary:      Effort: Pulmonary effort is normal.   Abdominal:      General: Abdomen is flat. There is no distension.      Palpations: Abdomen is soft.      Tenderness: There is no abdominal tenderness.      Comments: Soft, nontender abdomen   Genitourinary:     Comments: TVP wire in place  Skin:     General: Skin is warm and dry.   Neurological:      General: No focal deficit present.      Mental Status: She is alert.            Vents:  Oxygen Concentration (%): 50 (01/12/24 0915)    Lines/Drains/Airways       Central Venous Catheter Line  Duration                  Hemodialysis Catheter 01/25/23 1501 right internal jugular 352 days              Arterial Line  Duration             Arterial Line 01/08/24 1324 Right Radial 4 days              Line  Duration                  Pacer Wires 01/12/24 1032 <1 day              Peripheral Intravenous Line  Duration                  Hemodialysis AV Fistula 09/01/23 0800 Left forearm 133 days         Peripheral IV - Single Lumen 01/08/24 1108 22 G Posterior;Right Wrist 4 days         Peripheral IV - Single Lumen 01/08/24 1357 20 G Posterior;Right Forearm 4 days         Peripheral IV - Single Lumen 01/12/24 1415 20 G;1 3/4 in Right Forearm <1 day                    Significant Labs:    CBC/Anemia Profile:  Recent Labs   Lab 01/11/24  0358 01/12/24  0422 01/12/24  0718   WBC 15.24* 15.59*  --    HGB 9.0* 8.9*  --    HCT 26.2* 24.5* 27*    312  --    MCV 84 80*  --    RDW 18.6* 18.1*  --         Chemistries:  Recent Labs   Lab 01/11/24  0358 01/11/24  1553 01/12/24  0422 01/12/24  0631 01/12/24  1316      < > 143 139 141   K 5.1   < > 4.1 4.1 4.2      < > 102 102 101   CO2 14*   < > 21* 20* 22*   BUN 50*   < > 35* 34* 38*   CREATININE 4.6*   < > 3.5* 3.6* 4.0*   CALCIUM 9.1   < > 8.6* 8.3* 8.6*   ALBUMIN 2.3*   < >  2.2* 2.2* 2.1*   PROT 6.3  --  6.2 6.2  --    BILITOT 0.9  --  0.8 0.9  --    ALKPHOS 285*  --  231* 226*  --    ALT 20  --  16 19  --    AST 44*  --  35 32  --    MG 2.8*   < > 2.4 2.3 2.4   PHOS 4.5   < > 4.2 4.4 4.9*    < > = values in this interval not displayed.       All pertinent labs within the past 24 hours have been reviewed.    Significant Imaging:  I have reviewed all pertinent imaging results/findings within the past 24 hours.  Assessment/Plan:     Cardiac/Vascular  Atrial fibrillation with rapid ventricular response  74 yo female with 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 Stepped up to the unit for persistent afib rvr.       Neuro/Psych:   -- Sedation: none  -- Pain:  Lidocaine patch      Cards:   -- BP goals SBP >90, MAPs >55   - Off phenylephrine  -- Afib RVR   - Amio transitioned to PO, now held post cardiac arrest  -- Asystolic arrest   - morning of 1/12/24 patient went into asystole. Had multiple bradycardic events overnight which had self resolved. At time of asystole, nurse performed 1 min of chest compressions and achieved ROSC.EP notified and presented for STAT TVP placement. Access unable to be obtained due to central venous stenosis. 2nd episode of asystole. Resolved after minimal compressions. 3rd episode of asystole resolved with singular compression. Patient brought to Cath lab for TVP placed via groin.   - TVP placed @ back up rate of 70   - will likely require micra leadless PPM   - digoxin level high --> administering digibind     Pulm:   -- Goal O2 sat > 90%  -- Decreased O2 req to 2L, satting upper 90s. Likely could dc, but patient prefers having O2 on  -- Chest tube placed d/c'd 1/8/24  -- FU CXR-- pleural effusions 1/10   - CXR 1/11 showing RLL opacification      Renal:  -- history of ESRD, dialysis dependent.  Monday Wednesday Friday schedule while in the hospital.   - HD yesterday. 1.5L removed  --  last SLED on 1/8  -- trend BUN/Cr  -- patient is anuric  -- permacath in place last exchanged 10/2023  -- fistula unable to be used    Recent Labs   Lab 01/12/24  0422 01/12/24  0631 01/12/24  1316   BUN 35* 34* 38*   CREATININE 3.5* 3.6* 4.0*           FEN / GI:   -- CT on 1/8/24 demonstrating SBO  -- US on 1/8/24 showing distended gallbladder without signs of acute cholecystitis   - NPO  - NGT removed.  - Equivocal GGC 1/10, added scheduled reglan for gastric motility and suppository this morning. Will likely dc NGT today if bowel movement   - BM 1/9. None since. Will add bowel reg on since no obstruction  -- Replace lytes as needed  -- Nutrition:  Sips, Dietician consult placed for complex dietary restriction      ID:   -- Tm: afebrile; WBC within normal limits  -- f/u cultures and MRSA swab  Recent Labs   Lab 01/10/24  0317 01/11/24  0358 01/12/24  0422   WBC 10.08 15.24* 15.59*       -- Abx none      Heme/Onc:  -- Daily CBC  -- INR 1.2 this am   -- on heparin gtt for A-fib  Recent Labs   Lab 01/10/24  0317 01/10/24  0812 01/11/24  0358 01/12/24  0422 01/12/24  0631 01/12/24  1316   HGB 8.5*  --  9.0* 8.9*  --   --      --  300 312  --   --    APTT  --    < > 39.0* 33.9* 62.5* 24.9   INR 1.2  --  1.2 1.2 1.2  --     < > = values in this interval not displayed.           Endo:   -- history of diabetes  -- Gluc goal 140-180  -- SSI      PPx:   Feeding:  NPO  Analgesia/Sedation:  Pain is controlled, no sedation  Thromboembolic prevention: heparin restarted  HOB >30: yes  Stress Ulcer ppx: pantoprazole IV  Glucose control: Critical care goal 140-180 g/dl, SSI  Bowel reg: n/a SBO. NGT decompression  Invasive Lines/Drains/Airway: Permacath, PIV x2, TVP groin line  Deescalation: none        Dispo/Code Status/Palliative:   -- SICU/ Full Code            Critical care was time spent personally by me on the following activities: development of treatment plan with patient or surrogate and bedside caregivers,  discussions with consultants, evaluation of patient's response to treatment, examination of patient, ordering and performing treatments and interventions, ordering and review of laboratory studies, ordering and review of radiographic studies, pulse oximetry, re-evaluation of patient's condition.  This critical care time did not overlap with that of any other provider or involve time for any procedures.     Prabhu Esqueda MD  Critical Care - Surgery  [facility]

## 2024-01-12 NOTE — PLAN OF CARE
Brief Procedure Note:    : Todd Carlisle MD     Referring Physician: Tan Thomson     All Operators: Surgeon(s):  Todd Carlisle MD Shariati, Farnoosh, MD     Preoperative Diagnosis: Tachy-eulogio syndrome [I49.5]     Postop Diagnosis: Tachy-eulogio syndrome [I49.5]  s/p TVP    Treatments/Procedures: Procedure(s) (LRB):  Pacemaker, Temporary, Transvenous (Right)    Access: Right CFV    See catheterization report for full details.    Intervention: successful placement of a femoral TVP.  Back up rate 70  Output 10  See catheterization report for full details.      Estimated Blood loss: 20 cc    Specimens removed: No    Josy Mcmullen MD  Interventional Cardiology PGY-4  01/12/2024

## 2024-01-12 NOTE — SUBJECTIVE & OBJECTIVE
Past Medical History:   Diagnosis Date    Anxiety     Celiac disease 2018    Celiac disease     Depression     Diabetes mellitus     Family history of breast cancer in mother      at age 68    Hyperlipidemia     Hypertension     Meniere disease     Meniere's disease, unspecified ear     Menopause     Peptic ulcer     Reflux esophagitis     Urinary tract infection     Vaginal infection     Vaginal Pap smear 2014    Pap/Hpv Negative        Past Surgical History:   Procedure Laterality Date    APPENDECTOMY      BREAST SURGERY      Tags    CARPAL TUNNEL RELEASE Bilateral 2017    ,     CLOSURE, COLOSTOMY Left 2023    Procedure: CLOSURE, COLOSTOMY;  Surgeon: Manuel Javier MD;  Location: [facility] OR;  Service: General;  Laterality: Left;    COLONOSCOPY  2018    Normal  ( Juan A)     COLOSTOMY Right 2023    Procedure: CREATION, COLOSTOMY;  Surgeon: Manuel Javier MD;  Location: [facility] OR;  Service: General;  Laterality: Right;    DILATION AND CURETTAGE OF UTERUS  1986    DUPUYTREN CONTRACTURE RELEASE Bilateral 2017    HYSTERECTOMY      BEAU w/ appy, no BSO     INJECTION OF NEUROLYTIC AGENT AROUND LUMBAR SYMPATHETIC NERVE N/A 2022    Procedure: BLOCK, LUMBAR SYMPATHETIC;  Surgeon: Souleymane Rizo Jr., MD;  Location: [facility] PAIN MGT;  Service: Pain Management;  Laterality: N/A;  no pacemaker   pt is diabetic     INJECTION OF NEUROLYTIC AGENT AROUND LUMBAR SYMPATHETIC NERVE N/A 2022    Procedure: BLOCK, LUMBAR SYMPATHETIC;  Surgeon: Souleymane Rizo Jr., MD;  Location: [facility] PAIN [facility]GT;  Service: Pain Management;  Laterality: N/A;  diabetic     INSERTION OF TUNNELED CENTRAL VENOUS HEMODIALYSIS CATHETER Right 2023    Procedure: INSERTION, CATHETER, HEMODIALYSIS, DUAL LUMEN;  Surgeon: Manuel Javier MD;  Location: [facility] OR;  Service: General;  Laterality: Right;    INSERTION, CATHETER, TUNNELED Left 2023    Procedure: Insertion,catheter,tunneled;  Surgeon: Watson  CHANTAL Fontenot MD;  Location: [facility] OR;  Service: General;  Laterality: Left;    LAPAROTOMY, EXPLORATORY N/A 1/14/2023    Procedure: LAPAROTOMY, EXPLORATORY;  Surgeon: Manuel Javier MD;  Location: [facility] OR;  Service: General;  Laterality: N/A;    LAPAROTOMY, EXPLORATORY N/A 1/16/2023    Procedure: LAPAROTOMY, EXPLORATORY;  Surgeon: Manuel Javier MD;  Location: [facility] OR;  Service: General;  Laterality: N/A;    LYMPHADENECTOMY Right 1/2/2024    Procedure: LYMPHADENECTOMY;  Surgeon: Tan Thomson MD;  Location: [facility] OR 2ND FLR;  Service: Cardiothoracic;  Laterality: Right;    REMOVAL OF CATHETER Right 1/28/2023    Procedure: REMOVAL, CATHETER;  Surgeon: Watson Fontenot MD;  Location: [facility] OR;  Service: General;  Laterality: Right;    REMOVAL, TUNNELED CATH Right 1/25/2023    Procedure: REMOVAL, TUNNELED CATH;  Surgeon: Manuel Javier MD;  Location: [facility] OR;  Service: General;  Laterality: Right;    ROBOTIC BRONCHOSCOPY N/A 10/20/2023    Procedure: ROBOTIC BRONCHOSCOPY;  Surgeon: Mayda Monzon MD;  Location: [facility] OR 2ND FLR;  Service: Pulmonary;  Laterality: N/A;    SHOULDER SURGERY  2009 & 2010    right rotator cuff    THORACOTOMY Right 1/2/2024    Procedure: THORACOTOMY;  Surgeon: Tan Thomson MD;  Location: [facility] OR 2ND FLR;  Service: Cardiothoracic;  Laterality: Right;    THORACOTOMY, WITH INITIAL THERAPEUTIC WEDGE RESECTION OF LUNG Right 1/2/2024    Procedure: THORACOTOMY, WITH INITIAL THERAPEUTIC WEDGE RESECTION OF LUNG;  Surgeon: Tan Thomson MD;  Location: [facility] OR 2ND FLR;  Service: Cardiothoracic;  Laterality: Right;    TONSILLECTOMY      UPPER GASTROINTESTINAL ENDOSCOPY  04/2018       Review of patient's allergies indicates:   Allergen Reactions    Crestor [rosuvastatin] Swelling    Gluten protein        No current facility-administered medications on file prior to encounter.     Current Outpatient Medications on File Prior to Encounter   Medication Sig    atorvastatin (LIPITOR) 40 MG  tablet Take 1 tablet (40 mg total) by mouth once daily. (Patient taking differently: Take 40 mg by mouth every evening.)    epoetin noble-epbx (RETACRIT) 10,000 unit/mL imjection Inject 1 mL (10,000 Units total) into the skin every Mon, Wed, Fri. HOLD IF HEMOGLOBIN is 10 or GREATER    DO NOT SHAKE  DO NOT DILUTE  DO NOT MIX with other drug solutions    ferrous gluconate (FERGON) 324 MG tablet Take 1 tablet (324 mg total) by mouth daily with breakfast.    meclizine (ANTIVERT) 25 mg tablet Take 1 tablet (25 mg total) by mouth 3 (three) times daily as needed for Dizziness.    melatonin (MELATIN) 3 mg tablet Take 3 tablets (9 mg total) by mouth nightly as needed for Insomnia.    pantoprazole (PROTONIX) 40 MG tablet Take 1 tablet (40 mg total) by mouth once daily.    patiromer calcium sorbitex (VELTASSA) 8.4 gram PwPk Take 2 packets (16.8 g total) by mouth once daily.    sevelamer HCL (RENAGEL) 800 MG Tab Take 2 tablets (1,600 mg total) by mouth 3 (three) times daily with meals.    B complex-vitamin C-folic acid (MELLISSA-BENJY) 0.8 mg Tab Take 1 tablet (0.8 mg total) by mouth once daily.    betahistine HCl (BETAHISTINE, BULK, MISC)     blood sugar diagnostic (TRUE METRIX GLUCOSE TEST STRIP) Strp USE ONE STRIP FOR TESTING 2 TIMES A DAY    blood-glucose meter kit Use to test twice a day    calcium-vitamin D3 (OS-CIPRIANO 500 + D3) 500 mg-5 mcg (200 unit) per tablet Take 1 tablet by mouth once daily.    coenzyme Q10 100 mg capsule     dicyclomine (BENTYL) 10 MG capsule     dronabinoL (MARINOL) 5 MG capsule Take 1 capsule (5 mg total) by mouth 2 (two) times daily before meals.    econazole nitrate 1 % cream Apply topically 2 (two) times daily.    insulin aspart U-100 (NOVOLOG) 100 unit/mL (3 mL) InPn pen Inject before meals:  150-200=+1, 201-250=+2, 251-300=+3; 301-350=+4, over 350=+5 units    lancets Misc 1 lancet by Misc.(Non-Drug; Combo Route) route 4 (four) times daily.    mometasone (ELOCON) 0.1 % ointment Apply topically.     "neomycin (MYCIFRADIN) 500 mg Tab     nutritional supplements Liqd Take 237 mLs by mouth 4 (four) times daily.    oxyCODONE (ROXICODONE) 5 MG immediate release tablet Take 5 mg by mouth every 4 (four) hours as needed.    pen needle, diabetic (BD ULTRA-FINE MARIS PEN NEEDLE) 32 gauge x 5/32" Ndle 1 Device by Misc.(Non-Drug; Combo Route) route 4 (four) times daily with meals and nightly.    pregabalin (LYRICA) 150 MG capsule TAKE ONE CAPSULE BY MOUTH 2 TIMES A DAY    pregabalin (LYRICA) 75 MG capsule Take 1 capsule (75 mg total) by mouth Daily. Give after HD    simethicone (MYLICON) 125 mg Cap capsule Take by mouth.    vit C,S-Xu-ifjod-lutein-zeaxan (PRESERVISION AREDS 2) 044-084-85-1 mg-unit-mg-mg Cap     vitamins  A,C,E-zinc-copper 14,320-226-200 unit-mg-unit Cap Take 1 tablet by mouth once daily.     Family History       Problem Relation (Age of Onset)    Arthritis Father    Breast cancer Mother, Paternal Aunt    Cancer Mother, Paternal Aunt    Diabetes Paternal Grandfather    Hyperlipidemia Sister    Vision loss Father, Mother, Sister          Tobacco Use    Smoking status: Never    Smokeless tobacco: Never   Substance and Sexual Activity    Alcohol use: No    Drug use: Never    Sexual activity: Not Currently     Partners: Male     Birth control/protection: See Surgical Hx     Comment: :      Review of Systems   Constitutional: Negative for diaphoresis and fever.   Cardiovascular:  Negative for chest pain, dyspnea on exertion, leg swelling, near-syncope, orthopnea, palpitations, paroxysmal nocturnal dyspnea and syncope.   Respiratory:  Negative for cough and shortness of breath.    Gastrointestinal:  Negative for abdominal pain, diarrhea, nausea and vomiting.   Neurological:  Negative for light-headedness.   Psychiatric/Behavioral:  Negative for altered mental status and substance abuse.      Objective:     Vital Signs (Most Recent):  Temp: 97.7 °F (36.5 °C) (01/12/24 1500)  Pulse: 110 (01/12/24 1500)  Resp: " (!) 25 (01/12/24 1500)  BP: 138/89 (01/12/24 1300)  SpO2: 99 % (01/12/24 1500) Vital Signs (24h Range):  Temp:  [97.7 °F (36.5 °C)-99 °F (37.2 °C)] 97.7 °F (36.5 °C)  Pulse:  [0-172] 110  Resp:  [20-50] 25  SpO2:  [76 %-100 %] 99 %  BP: (104-138)/(61-89) 138/89  Arterial Line BP: (106-172)/(26-76) 129/68       Weight: 68.9 kg (152 lb)  Body mass index is 25.29 kg/m².    SpO2: 99 %        Physical Exam  Constitutional:       Appearance: Normal appearance. She is ill-appearing.   Cardiovascular:      Rate and Rhythm: Normal rate. Rhythm irregular.      Pulses: Normal pulses.      Heart sounds: Normal heart sounds.   Pulmonary:      Effort: Pulmonary effort is normal.      Breath sounds: Normal breath sounds. No wheezing or rales.   Musculoskeletal:      Right lower leg: No edema.      Left lower leg: No edema.   Skin:     General: Skin is warm and dry.   Neurological:      Mental Status: She is alert and oriented to person, place, and time.            Significant Labs: All pertinent lab results from the last 24 hours have been reviewed.    Significant Imaging:  Reviewed

## 2024-01-12 NOTE — PT/OT/SLP PROGRESS
Occupational Therapy      Patient Name:  Lily Green   MRN:  [id number]    Patient not seen today secondary to pt on hold due to TVP placement.  Will follow-up when appropriate.    1/12/2024

## 2024-01-12 NOTE — SUBJECTIVE & OBJECTIVE
Interval History/Significant Events: NAEON. Yesterday has a bowel movement, pulled NGT. She underwent HD removing 1.5L. Abx started for RLL opacification, cough, and WBC elevation. Eulogio to 15 yesterday after voiding. After assessment appears to be vasovagal event. Afebrile. Transitioned Medications PO except for AC until confident in gut absorption.      Update: Called for asystolic code. Nurse alerted by alarms of asystole, commenced chest compressions. ROSC achieved after 1 min of compressions. Patient Aox4 shortly after event. Patient repeated eulogio episodes and asystole requiring compression 2 additional times. Once while EP attempting to place TVP and again afterwards prior to going to cath lab.     Follow-up For: Procedure(s) (LRB):  Pacemaker, Temporary, Transvenous (Right)    Post-Operative Day: Day of Surgery    Objective:     Vital Signs (Most Recent):  Temp: 98.6 °F (37 °C) (01/12/24 1121)  Pulse: (!) 119 (01/12/24 1400)  Resp: (!) 38 (01/12/24 1400)  BP: 138/89 (01/12/24 1300)  SpO2: 98 % (01/12/24 1400) Vital Signs (24h Range):  Temp:  [97.7 °F (36.5 °C)-99 °F (37.2 °C)] 98.6 °F (37 °C)  Pulse:  [0-172] 119  Resp:  [20-50] 38  SpO2:  [76 %-100 %] 98 %  BP: (104-138)/(61-89) 138/89  Arterial Line BP: (106-172)/(26-76) 124/60     Weight: 68.9 kg (152 lb)  Body mass index is 25.29 kg/m².      Intake/Output Summary (Last 24 hours) at 1/12/2024 1559  Last data filed at 1/12/2024 1400  Gross per 24 hour   Intake 606.18 ml   Output --   Net 606.18 ml          Physical Exam  Constitutional:       Appearance: Normal appearance.   HENT:      Nose:      Comments: NGT in place  Cardiovascular:      Rate and Rhythm: Normal rate and regular rhythm.      Pulses: Normal pulses.   Pulmonary:      Effort: Pulmonary effort is normal.   Abdominal:      General: Abdomen is flat. There is no distension.      Palpations: Abdomen is soft.      Tenderness: There is no abdominal tenderness.      Comments: Soft, nontender  abdomen   Genitourinary:     Comments: TVP wire in place  Skin:     General: Skin is warm and dry.   Neurological:      General: No focal deficit present.      Mental Status: She is alert.            Vents:  Oxygen Concentration (%): 50 (01/12/24 0915)    Lines/Drains/Airways       Central Venous Catheter Line  Duration                  Hemodialysis Catheter 01/25/23 1501 right internal jugular 352 days              Arterial Line  Duration             Arterial Line 01/08/24 1324 Right Radial 4 days              Line  Duration                  Pacer Wires 01/12/24 1032 <1 day              Peripheral Intravenous Line  Duration                  Hemodialysis AV Fistula 09/01/23 0800 Left forearm 133 days         Peripheral IV - Single Lumen 01/08/24 1108 22 G Posterior;Right Wrist 4 days         Peripheral IV - Single Lumen 01/08/24 1357 20 G Posterior;Right Forearm 4 days         Peripheral IV - Single Lumen 01/12/24 1415 20 G;1 3/4 in Right Forearm <1 day                    Significant Labs:    CBC/Anemia Profile:  Recent Labs   Lab 01/11/24  0358 01/12/24  0422 01/12/24  0718   WBC 15.24* 15.59*  --    HGB 9.0* 8.9*  --    HCT 26.2* 24.5* 27*    312  --    MCV 84 80*  --    RDW 18.6* 18.1*  --         Chemistries:  Recent Labs   Lab 01/11/24  0358 01/11/24  1553 01/12/24  0422 01/12/24  0631 01/12/24  1316      < > 143 139 141   K 5.1   < > 4.1 4.1 4.2      < > 102 102 101   CO2 14*   < > 21* 20* 22*   BUN 50*   < > 35* 34* 38*   CREATININE 4.6*   < > 3.5* 3.6* 4.0*   CALCIUM 9.1   < > 8.6* 8.3* 8.6*   ALBUMIN 2.3*   < > 2.2* 2.2* 2.1*   PROT 6.3  --  6.2 6.2  --    BILITOT 0.9  --  0.8 0.9  --    ALKPHOS 285*  --  231* 226*  --    ALT 20  --  16 19  --    AST 44*  --  35 32  --    MG 2.8*   < > 2.4 2.3 2.4   PHOS 4.5   < > 4.2 4.4 4.9*    < > = values in this interval not displayed.       All pertinent labs within the past 24 hours have been reviewed.    Significant Imaging:  I have reviewed all  pertinent imaging results/findings within the past 24 hours.

## 2024-01-12 NOTE — ASSESSMENT & PLAN NOTE
#Bradycardia    73yoF with ESRD admitted for bronchial tumor removal with post op course complicated by atrial fibrillation. She had difficult to control rates but further post-operatively, her rates improved so was weaned off rate controlling agents. Overnight 1/11-1/12 and morning of 1/12 she had several bradycardic episodes in the setting of atrial fibrillation, recent digoxin use (reportedly renally dosed, but some high levels with ESRD).    In the setting of asystolic events with several short rounds of compressions (no more than 1 round at a time), TVP was attempted. 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     Recommendations:  - TVP  - Dig levels in the setting of ESRD  - Would try Digifab. Low downside from a toxicity standpoint and her syndrome could be in the setting of digoxin toxicity  - We will follow over the weekend and will place PPM if needed early next week  - Okay to use AV pérez blocking agents if needed for RVR, but would stay with short acting IV BB pushes so we can continue to monitor her underlying AV pérez activity  - Continue anticoagulation

## 2024-01-12 NOTE — SUBJECTIVE & OBJECTIVE
"Interval HPI:   Overnight events: No acute events overnight. Patient in room 610[address] CVICU/95896 CVICU A. Blood glucose stable. BG at and above goal on current insulin regimen (SSI ). BG trending upward. Steroid use- None. 1 Day Post-Op  Renal function- Abnormal - Creatinine 4.6  Vasopressors-  None       Endocrine will continue to follow and manage insulin orders inpatient.         Diet NPO Except for: Sips with Medication     Eating:   NPO  Nausea: No  Hypoglycemia and intervention: No  Fever: No  TPN and/or TF: No      BP (!) 141/69 (BP Location: Right arm, Patient Position: Lying)   Pulse 80   Temp 97.6 °F (36.4 °C) (Oral)   Resp 20   Ht 5' 5" (1.651 m)   Wt 68.9 kg (152 lb)   LMP  (LMP Unknown) Comment: BEAU/ NO BSO  1986  SpO2 99%   Breastfeeding No   BMI 25.29 kg/m²     Labs Reviewed and Include    Recent Labs   Lab 01/13/24  0325   *   CALCIUM 8.8   ALBUMIN 2.1*   PROT 5.9*      K 4.4   CO2 24      BUN 47*   CREATININE 4.6*   ALKPHOS 186*   ALT 9*   AST 21   BILITOT 0.7     Lab Results   Component Value Date    WBC 18.98 (H) 01/13/2024    HGB 8.4 (L) 01/13/2024    HCT 22.5 (L) 01/13/2024    MCV 77 (L) 01/13/2024     01/13/2024     No results for input(s): "TSH", "FREET4" in the last 168 hours.  Lab Results   Component Value Date    HGBA1C 4.9 11/15/2023       Nutritional status:   Body mass index is 25.29 kg/m².  Lab Results   Component Value Date    ALBUMIN 2.1 (L) 01/13/2024    ALBUMIN 2.1 (L) 01/12/2024    ALBUMIN 2.1 (L) 01/12/2024     No results found for: "PREALBUMIN"    Estimated Creatinine Clearance: 10.6 mL/min (A) (based on SCr of 4.6 mg/dL (H)).    Accu-Checks  Recent Labs     01/11/24  0805 01/11/24  1220 01/11/24  1552 01/11/24  1947 01/12/24  0013 01/12/24  0424 01/12/24  1318 01/12/24  1558 01/13/24  0031 01/13/24  0533   POCTGLUCOSE 172* 160* 130* 177* 139* 185* 170* 167* 218* 193*       Current Medications and/or Treatments Impacting Glycemic " Control  Immunotherapy:    Immunosuppressants       None          Steroids:   Hormones (From admission, onward)      None          Pressors:    Autonomic Drugs (From admission, onward)      None          Hyperglycemia/Diabetes Medications:   Antihyperglycemics (From admission, onward)      Start     Stop Route Frequency Ordered    01/11/24 0824  insulin aspart U-100 pen 0-10 Units         -- SubQ Every 4 hours PRN 01/11/24 0724

## 2024-01-13 LAB
ALBUMIN SERPL BCP-MCNC: 2.1 G/DL (ref 3.5–5.2)
ALBUMIN SERPL BCP-MCNC: 2.1 G/DL (ref 3.5–5.2)
ALP SERPL-CCNC: 186 U/L (ref 55–135)
ALT SERPL W/O P-5'-P-CCNC: 9 U/L (ref 10–44)
ANION GAP SERPL CALC-SCNC: 17 MMOL/L (ref 8–16)
ANION GAP SERPL CALC-SCNC: 18 MMOL/L (ref 8–16)
ANISOCYTOSIS BLD QL SMEAR: SLIGHT
APTT PPP: 44.7 SEC (ref 21–32)
APTT PPP: 47.2 SEC (ref 21–32)
AST SERPL-CCNC: 21 U/L (ref 10–40)
BASOPHILS # BLD AUTO: ABNORMAL K/UL (ref 0–0.2)
BASOPHILS NFR BLD: 0 % (ref 0–1.9)
BILIRUB SERPL-MCNC: 0.7 MG/DL (ref 0.1–1)
BUN SERPL-MCNC: 47 MG/DL (ref 8–23)
BUN SERPL-MCNC: 58 MG/DL (ref 8–23)
CALCIUM SERPL-MCNC: 8.8 MG/DL (ref 8.7–10.5)
CALCIUM SERPL-MCNC: 9 MG/DL (ref 8.7–10.5)
CHLORIDE SERPL-SCNC: 100 MMOL/L (ref 95–110)
CHLORIDE SERPL-SCNC: 100 MMOL/L (ref 95–110)
CO2 SERPL-SCNC: 21 MMOL/L (ref 23–29)
CO2 SERPL-SCNC: 24 MMOL/L (ref 23–29)
CREAT SERPL-MCNC: 4.6 MG/DL (ref 0.5–1.4)
CREAT SERPL-MCNC: 5.2 MG/DL (ref 0.5–1.4)
DIFFERENTIAL METHOD BLD: ABNORMAL
DIGOXIN SERPL-MCNC: 1.5 NG/ML (ref 0.8–2)
EOSINOPHIL # BLD AUTO: ABNORMAL K/UL (ref 0–0.5)
EOSINOPHIL NFR BLD: 0 % (ref 0–8)
ERYTHROCYTE [DISTWIDTH] IN BLOOD BY AUTOMATED COUNT: 18 % (ref 11.5–14.5)
EST. GFR  (NO RACE VARIABLE): 8.2 ML/MIN/1.73 M^2
EST. GFR  (NO RACE VARIABLE): 9.5 ML/MIN/1.73 M^2
GLUCOSE SERPL-MCNC: 178 MG/DL (ref 70–110)
GLUCOSE SERPL-MCNC: 196 MG/DL (ref 70–110)
HCT VFR BLD AUTO: 22.5 % (ref 37–48.5)
HGB BLD-MCNC: 8.4 G/DL (ref 12–16)
HYPOCHROMIA BLD QL SMEAR: ABNORMAL
IMM GRANULOCYTES # BLD AUTO: ABNORMAL K/UL (ref 0–0.04)
IMM GRANULOCYTES NFR BLD AUTO: ABNORMAL % (ref 0–0.5)
LYMPHOCYTES # BLD AUTO: ABNORMAL K/UL (ref 1–4.8)
LYMPHOCYTES NFR BLD: 12 % (ref 18–48)
MAGNESIUM SERPL-MCNC: 2.5 MG/DL (ref 1.6–2.6)
MAGNESIUM SERPL-MCNC: 2.6 MG/DL (ref 1.6–2.6)
MCH RBC QN AUTO: 28.8 PG (ref 27–31)
MCHC RBC AUTO-ENTMCNC: 37.3 G/DL (ref 32–36)
MCV RBC AUTO: 77 FL (ref 82–98)
METAMYELOCYTES NFR BLD MANUAL: 2 %
MONOCYTES # BLD AUTO: ABNORMAL K/UL (ref 0.3–1)
MONOCYTES NFR BLD: 8 % (ref 4–15)
MRSA SPEC QL CULT: NORMAL
MYELOCYTES NFR BLD MANUAL: 2 %
NEUTROPHILS NFR BLD: 76 % (ref 38–73)
NRBC BLD-RTO: 0 /100 WBC
OVALOCYTES BLD QL SMEAR: ABNORMAL
PHOSPHATE SERPL-MCNC: 5.6 MG/DL (ref 2.7–4.5)
PHOSPHATE SERPL-MCNC: 6 MG/DL (ref 2.7–4.5)
PLATELET # BLD AUTO: 308 K/UL (ref 150–450)
PLATELET BLD QL SMEAR: ABNORMAL
PMV BLD AUTO: 11.5 FL (ref 9.2–12.9)
POCT GLUCOSE: 154 MG/DL (ref 70–110)
POCT GLUCOSE: 173 MG/DL (ref 70–110)
POCT GLUCOSE: 189 MG/DL (ref 70–110)
POCT GLUCOSE: 193 MG/DL (ref 70–110)
POCT GLUCOSE: 209 MG/DL (ref 70–110)
POCT GLUCOSE: 218 MG/DL (ref 70–110)
POIKILOCYTOSIS BLD QL SMEAR: SLIGHT
POLYCHROMASIA BLD QL SMEAR: ABNORMAL
POTASSIUM SERPL-SCNC: 4.3 MMOL/L (ref 3.5–5.1)
POTASSIUM SERPL-SCNC: 4.4 MMOL/L (ref 3.5–5.1)
PROT SERPL-MCNC: 5.9 G/DL (ref 6–8.4)
RBC # BLD AUTO: 2.92 M/UL (ref 4–5.4)
SODIUM SERPL-SCNC: 139 MMOL/L (ref 136–145)
SODIUM SERPL-SCNC: 141 MMOL/L (ref 136–145)
TARGETS BLD QL SMEAR: ABNORMAL
VANCOMYCIN SERPL-MCNC: 12.5 UG/ML
WBC # BLD AUTO: 18.98 K/UL (ref 3.9–12.7)

## 2024-01-13 PROCEDURE — 11000001 HC ACUTE MED/SURG PRIVATE ROOM

## 2024-01-13 PROCEDURE — 80053 COMPREHEN METABOLIC PANEL: CPT | Performed by: SURGERY

## 2024-01-13 PROCEDURE — 25000003 PHARM REV CODE 250

## 2024-01-13 PROCEDURE — 80162 ASSAY OF DIGOXIN TOTAL: CPT

## 2024-01-13 PROCEDURE — 90945 DIALYSIS ONE EVALUATION: CPT

## 2024-01-13 PROCEDURE — 27000221 HC OXYGEN, UP TO 24 HOURS

## 2024-01-13 PROCEDURE — 25500020 PHARM REV CODE 255: Performed by: STUDENT IN AN ORGANIZED HEALTH CARE EDUCATION/TRAINING PROGRAM

## 2024-01-13 PROCEDURE — 85027 COMPLETE CBC AUTOMATED: CPT | Performed by: SURGERY

## 2024-01-13 PROCEDURE — 63600175 PHARM REV CODE 636 W HCPCS

## 2024-01-13 PROCEDURE — 85730 THROMBOPLASTIN TIME PARTIAL: CPT | Mod: 91 | Performed by: STUDENT IN AN ORGANIZED HEALTH CARE EDUCATION/TRAINING PROGRAM

## 2024-01-13 PROCEDURE — 25000242 PHARM REV CODE 250 ALT 637 W/ HCPCS

## 2024-01-13 PROCEDURE — 83735 ASSAY OF MAGNESIUM: CPT | Performed by: SURGERY

## 2024-01-13 PROCEDURE — 80202 ASSAY OF VANCOMYCIN: CPT | Performed by: STUDENT IN AN ORGANIZED HEALTH CARE EDUCATION/TRAINING PROGRAM

## 2024-01-13 PROCEDURE — 99291 CRITICAL CARE FIRST HOUR: CPT | Mod: ,,, | Performed by: ANESTHESIOLOGY

## 2024-01-13 PROCEDURE — 99900035 HC TECH TIME PER 15 MIN (STAT)

## 2024-01-13 PROCEDURE — 25000003 PHARM REV CODE 250: Performed by: STUDENT IN AN ORGANIZED HEALTH CARE EDUCATION/TRAINING PROGRAM

## 2024-01-13 PROCEDURE — 94640 AIRWAY INHALATION TREATMENT: CPT

## 2024-01-13 PROCEDURE — 85007 BL SMEAR W/DIFF WBC COUNT: CPT | Performed by: SURGERY

## 2024-01-13 PROCEDURE — 94761 N-INVAS EAR/PLS OXIMETRY MLT: CPT

## 2024-01-13 PROCEDURE — 63600175 PHARM REV CODE 636 W HCPCS: Performed by: STUDENT IN AN ORGANIZED HEALTH CARE EDUCATION/TRAINING PROGRAM

## 2024-01-13 PROCEDURE — 99233 SBSQ HOSP IP/OBS HIGH 50: CPT | Mod: GC,,, | Performed by: INTERNAL MEDICINE

## 2024-01-13 PROCEDURE — 25000003 PHARM REV CODE 250: Performed by: SURGERY

## 2024-01-13 PROCEDURE — 93010 ELECTROCARDIOGRAM REPORT: CPT | Mod: ,,, | Performed by: INTERNAL MEDICINE

## 2024-01-13 PROCEDURE — 99232 SBSQ HOSP IP/OBS MODERATE 35: CPT | Mod: ,,, | Performed by: NURSE PRACTITIONER

## 2024-01-13 PROCEDURE — 84100 ASSAY OF PHOSPHORUS: CPT | Performed by: SURGERY

## 2024-01-13 PROCEDURE — 93005 ELECTROCARDIOGRAM TRACING: CPT

## 2024-01-13 PROCEDURE — 83735 ASSAY OF MAGNESIUM: CPT | Mod: 91 | Performed by: STUDENT IN AN ORGANIZED HEALTH CARE EDUCATION/TRAINING PROGRAM

## 2024-01-13 PROCEDURE — 80069 RENAL FUNCTION PANEL: CPT | Performed by: STUDENT IN AN ORGANIZED HEALTH CARE EDUCATION/TRAINING PROGRAM

## 2024-01-13 PROCEDURE — 85730 THROMBOPLASTIN TIME PARTIAL: CPT

## 2024-01-13 RX ORDER — HYDROCODONE BITARTRATE AND ACETAMINOPHEN 500; 5 MG/1; MG/1
TABLET ORAL CONTINUOUS
Status: ACTIVE | OUTPATIENT
Start: 2024-01-13 | End: 2024-01-14

## 2024-01-13 RX ORDER — HYDROCODONE BITARTRATE AND ACETAMINOPHEN 500; 5 MG/1; MG/1
TABLET ORAL CONTINUOUS
Status: DISCONTINUED | OUTPATIENT
Start: 2024-01-13 | End: 2024-01-13

## 2024-01-13 RX ORDER — PHENYLEPHRINE HCL IN 0.9% NACL 1 MG/10 ML
SYRINGE (ML) INTRAVENOUS
Status: COMPLETED
Start: 2024-01-13 | End: 2024-01-13

## 2024-01-13 RX ORDER — MAGNESIUM SULFATE HEPTAHYDRATE 40 MG/ML
2 INJECTION, SOLUTION INTRAVENOUS
Status: ACTIVE | OUTPATIENT
Start: 2024-01-13 | End: 2024-01-14

## 2024-01-13 RX ORDER — DRONABINOL 2.5 MG/1
2.5 CAPSULE ORAL 2 TIMES DAILY
Status: DISCONTINUED | OUTPATIENT
Start: 2024-01-14 | End: 2024-01-14

## 2024-01-13 RX ADMIN — INSULIN ASPART 2 UNITS: 100 INJECTION, SOLUTION INTRAVENOUS; SUBCUTANEOUS at 09:01

## 2024-01-13 RX ADMIN — SODIUM CHLORIDE: 9 INJECTION, SOLUTION INTRAVENOUS at 09:01

## 2024-01-13 RX ADMIN — METOCLOPRAMIDE HYDROCHLORIDE 5 MG: 10 INJECTION, SOLUTION INTRAMUSCULAR; INTRAVENOUS at 12:01

## 2024-01-13 RX ADMIN — HEPARIN SODIUM 16 UNITS/KG/HR: 10000 INJECTION, SOLUTION INTRAVENOUS at 09:01

## 2024-01-13 RX ADMIN — INSULIN ASPART 4 UNITS: 100 INJECTION, SOLUTION INTRAVENOUS; SUBCUTANEOUS at 10:01

## 2024-01-13 RX ADMIN — LIDOCAINE 5% 1 PATCH: 700 PATCH TOPICAL at 08:01

## 2024-01-13 RX ADMIN — PIPERACILLIN SODIUM AND TAZOBACTAM SODIUM 4.5 G: 4; .5 INJECTION, POWDER, FOR SOLUTION INTRAVENOUS at 09:01

## 2024-01-13 RX ADMIN — ALBUTEROL SULFATE 2.5 MG: 2.5 SOLUTION RESPIRATORY (INHALATION) at 08:01

## 2024-01-13 RX ADMIN — INSULIN ASPART 2 UNITS: 100 INJECTION, SOLUTION INTRAVENOUS; SUBCUTANEOUS at 05:01

## 2024-01-13 RX ADMIN — IOHEXOL 75 ML: 350 INJECTION, SOLUTION INTRAVENOUS at 07:01

## 2024-01-13 RX ADMIN — METOCLOPRAMIDE HYDROCHLORIDE 5 MG: 10 INJECTION, SOLUTION INTRAMUSCULAR; INTRAVENOUS at 04:01

## 2024-01-13 RX ADMIN — PIPERACILLIN SODIUM AND TAZOBACTAM SODIUM 4.5 G: 4; .5 INJECTION, POWDER, FOR SOLUTION INTRAVENOUS at 08:01

## 2024-01-13 RX ADMIN — METOCLOPRAMIDE HYDROCHLORIDE 5 MG: 10 INJECTION, SOLUTION INTRAMUSCULAR; INTRAVENOUS at 11:01

## 2024-01-13 RX ADMIN — ALBUTEROL SULFATE 2.5 MG: 2.5 SOLUTION RESPIRATORY (INHALATION) at 07:01

## 2024-01-13 RX ADMIN — INSULIN ASPART 4 UNITS: 100 INJECTION, SOLUTION INTRAVENOUS; SUBCUTANEOUS at 12:01

## 2024-01-13 RX ADMIN — ALBUTEROL SULFATE 2.5 MG: 2.5 SOLUTION RESPIRATORY (INHALATION) at 01:01

## 2024-01-13 RX ADMIN — PANTOPRAZOLE SODIUM 40 MG: 40 TABLET, DELAYED RELEASE ORAL at 08:01

## 2024-01-13 RX ADMIN — ACETYLCYSTEINE 4 ML: 100 INHALANT RESPIRATORY (INHALATION) at 08:01

## 2024-01-13 RX ADMIN — METOCLOPRAMIDE HYDROCHLORIDE 5 MG: 10 INJECTION, SOLUTION INTRAMUSCULAR; INTRAVENOUS at 05:01

## 2024-01-13 RX ADMIN — ACETYLCYSTEINE 4 ML: 100 INHALANT RESPIRATORY (INHALATION) at 01:01

## 2024-01-13 RX ADMIN — INSULIN ASPART 2 UNITS: 100 INJECTION, SOLUTION INTRAVENOUS; SUBCUTANEOUS at 04:01

## 2024-01-13 RX ADMIN — ACETYLCYSTEINE 4 ML: 100 INHALANT RESPIRATORY (INHALATION) at 07:01

## 2024-01-13 NOTE — NURSING
Chest Xray ordered on patient and completed. Notified Dr Hassan, who spoke with radiologist and Dr Landon and updated them. No new orders at this time.

## 2024-01-13 NOTE — NURSING
"HR drop to 56; appears to be SR. See Airstrip. Pt states she "was trying to pee" denies straining. Asymptomatic.   "

## 2024-01-13 NOTE — PROGRESS NOTES
[facility] - [facility]  Endocrinology  Progress Note    Admit Date: 1/2/2024     Reason for Consult: Management of T2DM, Hyperglycemia     Surgical Procedure and Date: 1/2/24--   LYMPHADENECTOMY (Right)  THORACOTOMY (Right)  THORACOTOMY, WITH INITIAL THERAPEUTIC WEDGE RESECTION OF LUNG (Right)    Diabetes diagnosis year: in her 50s    Home Diabetes Medications:    LDC SSI   Blood sugar 150 to 200, + 1 unit  Blood sugar 201 to 250, + 2 units  Blood sugar 251 to 300, + 3 units  Blood sugar 301 to 350, + 4 units   Blood sugar greater than 350, + 5 units    How often checking glucose at home? >4 x day   BG readings on regimen: 100-120s  Hypoglycemia on the regimen?  denies   Missed doses on regimen?  No    Diabetes Complications include:     Nephropathy     Complicating diabetes co morbidities:   ESRD and Glucocorticoid use       HPI:   Patient is a 73 y.o. female with DDD, benign essential tremor, meniere's disease, HTN, HLD, DM2, GERD, Celiac's, IBS who is found to have RML Carcinoid tumor.  Chest CT 10/18/23 revealed 1.2 x 1.1 cm right middle lobe nodule, previously 1.2 x 1.0 cm in July 2023. Underwent Robotic Bronchoscopy 10/20/23; path: RML nodule positive for well-differentiated neuroendocrine neoplasm, carcinoid tumor. PET/CT Cu64 11/17/23 showed known RML nodule with hypermetabolic activity. Now presents s/p thoracotomy with wedge resection of right lung. Endocrine consulted for BG management.         Interval HPI:   Overnight events: No acute events overnight. Patient in room 40858 CVICU/28091 CVICU A. Blood glucose stable. BG at and above goal on current insulin regimen (SSI ). BG trending upward. Steroid use- None. 1 Day Post-Op  Renal function- Abnormal - Creatinine 4.6  Vasopressors-  None       Endocrine will continue to follow and manage insulin orders inpatient.         Diet NPO Except for: Sips with Medication     Eating:   NPO  Nausea: No  Hypoglycemia and intervention: No  Fever: No  TPN  "and/or TF: No      BP (!) 141/69 (BP Location: Right arm, Patient Position: Lying)   Pulse 80   Temp 97.6 °F (36.4 °C) (Oral)   Resp 20   Ht 5' 5" (1.651 m)   Wt 68.9 kg (152 lb)   LMP  (LMP Unknown) Comment: BEAU/ NO BSO  1986  SpO2 99%   Breastfeeding No   BMI 25.29 kg/m²     Labs Reviewed and Include    Recent Labs   Lab 01/13/24  0325   *   CALCIUM 8.8   ALBUMIN 2.1*   PROT 5.9*      K 4.4   CO2 24      BUN 47*   CREATININE 4.6*   ALKPHOS 186*   ALT 9*   AST 21   BILITOT 0.7     Lab Results   Component Value Date    WBC 18.98 (H) 01/13/2024    HGB 8.4 (L) 01/13/2024    HCT 22.5 (L) 01/13/2024    MCV 77 (L) 01/13/2024     01/13/2024     No results for input(s): "TSH", "FREET4" in the last 168 hours.  Lab Results   Component Value Date    HGBA1C 4.9 11/15/2023       Nutritional status:   Body mass index is 25.29 kg/m².  Lab Results   Component Value Date    ALBUMIN 2.1 (L) 01/13/2024    ALBUMIN 2.1 (L) 01/12/2024    ALBUMIN 2.1 (L) 01/12/2024     No results found for: "PREALBUMIN"    Estimated Creatinine Clearance: 10.6 mL/min (A) (based on SCr of 4.6 mg/dL (H)).    Accu-Checks  Recent Labs     01/11/24  0805 01/11/24  1220 01/11/24  1552 01/11/24  1947 01/12/24  0013 01/12/24  0424 01/12/24  1318 01/12/24  1558 01/13/24  0031 01/13/24  0533   POCTGLUCOSE 172* 160* 130* 177* 139* 185* 170* 167* 218* 193*       Current Medications and/or Treatments Impacting Glycemic Control  Immunotherapy:    Immunosuppressants       None          Steroids:   Hormones (From admission, onward)      None          Pressors:    Autonomic Drugs (From admission, onward)      None          Hyperglycemia/Diabetes Medications:   Antihyperglycemics (From admission, onward)      Start     Stop Route Frequency Ordered    01/11/24 0824  insulin aspart U-100 pen 0-10 Units         -- SubQ Every 4 hours PRN 01/11/24 0724            ASSESSMENT and PLAN    Renal/  ESRD (end stage renal disease)    Titrate " insulin slowly to avoid hypoglycemia as the risk of hypoglycemia increases with decreased creatinine clearance.      Oncology  * Malignant carcinoid tumor of bronchus  Managed per primary team        Endocrine  Type 2 diabetes mellitus with diabetic polyneuropathy, without long-term current use of insulin  BG goal: 140-180    - Novolog Mod dose correction with ISF 25 starting at 150 prn ac/hs   - POCT Glucose every 4 hours  - Hypoglycemia protocol in place      ** Please notify Endocrine for any change and/or advance in diet**  ** Please call Endocrine for any BG related issues **     Discharge Planning:   TBD. Please notify endocrinology prior to discharge.             Pranav Perry, DNP, FNP  Endocrinology  [facility] - Surgical Intensive Care

## 2024-01-13 NOTE — ASSESSMENT & PLAN NOTE
BG goal: 140-180    - Novolog Mod dose correction with ISF 25 starting at 150 prn ac/hs   - POCT Glucose every 4 hours  - Hypoglycemia protocol in place      ** Please notify Endocrine for any change and/or advance in diet**  ** Please call Endocrine for any BG related issues **     Discharge Planning:   TBD. Please notify endocrinology prior to discharge.

## 2024-01-13 NOTE — PROGRESS NOTES
[facility] - [facility]ensive [facility]  Nephrology  Progress Note    Patient Name: Lily Green  MRN: [id number]  Admission Date: 1/2/2024  Hospital Length of Stay: 11 days  Attending Provider: Tan Thomson MD   Primary Care Physician: Pavel Calixto MD  Principal Problem:Malignant carcinoid tumor of bronchus    Subjective:     HPI: Patient is a 73 y.o. 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 S/p thoracotomy. Last HD prior to presentation 1/1/24. Nephrology consulted for ESRD on HD.        Interval History: Multiple episodes of asystole requiring, cardiology suspected dig toxicity with possible vagal component, given digifab at 1600.     Review of patient's allergies indicates:   Allergen Reactions    Crestor [rosuvastatin] Swelling    Gluten protein      Current Facility-Administered Medications   Medication Frequency    0.9%  NaCl infusion PRN    acetylcysteine 100 mg/ml (10%) solution 4 mL TID WAKE    albuterol sulfate nebulizer solution 2.5 mg TID WAKE    bisacodyL suppository 10 mg Daily PRN    dextrose 10% bolus 125 mL 125 mL PRN    dextrose 10% bolus 250 mL 250 mL PRN    epoetin noble injection 4,000 Units Every Mon, Wed, Fri    glucagon (human recombinant) injection 1 mg PRN    glucose chewable tablet 16 g PRN    glucose chewable tablet 24 g PRN    heparin (porcine) injection 1,000 Units PRN    heparin 25,000 units in dextrose 5% (100 units/ml) IV bolus from bag - ADDITIONAL PRN BOLUS - 30 units/kg PRN    heparin 25,000 units in dextrose 5% (100 units/ml) IV bolus from bag - ADDITIONAL PRN BOLUS - 60 units/kg PRN    heparin 25,000 units in dextrose 5% 250 mL (100 units/mL) infusion LOW INTENSITY nomogram - [facility] Continuous    hydrALAZINE injection 10 mg Q6H PRN    insulin aspart U-100 pen 0-10 Units Q4H PRN    LIDOcaine 5 % patch 1 patch Q24H    metoclopramide injection 5 mg  Q6H    ondansetron injection 8 mg Q8H PRN    pantoprazole EC tablet 40 mg Daily    piperacillin-tazobactam (ZOSYN) 4.5 g in dextrose 5 % in water (D5W) 100 mL IVPB (MB+) Q12H    sevelamer carbonate tablet 1,600 mg TID WM    sodium chloride 0.9% flush 10 mL PRN    vancomycin - pharmacy to dose pharmacy to manage frequency       Objective:     Vital Signs (Most Recent):  Temp: 97.6 °F (36.4 °C) (01/12/24 2300)  Pulse: 80 (01/13/24 0748)  Resp: 20 (01/13/24 0748)  BP: (!) 141/69 (01/13/24 0700)  SpO2: 99 % (01/13/24 0748) Vital Signs (24h Range):  Temp:  [97.6 °F (36.4 °C)-98.6 °F (37 °C)] 97.6 °F (36.4 °C)  Pulse:  [0-265] 80  Resp:  [17-50] 20  SpO2:  [95 %-100 %] 99 %  BP: ()/(55-89) 141/69  Arterial Line BP: (106-168)/(48-70) 142/59     Weight: 68.9 kg (152 lb) (01/12/24 0900)  Body mass index is 25.29 kg/m².  Body surface area is 1.78 meters squared.    I/O last 3 completed shifts:  In: 711.9 [I.V.:408.8; IV Piggyback:303.1]  Out: -      Physical Exam  Constitutional:       General: She is not in acute distress.     Appearance: She is ill-appearing. She is not toxic-appearing.   HENT:      Head: Normocephalic and atraumatic.   Cardiovascular:      Heart sounds: Murmur heard.      Comments: Right permcath  Musculoskeletal:      Right lower leg: Edema present.      Left lower leg: Edema present.   Neurological:      Mental Status: She is alert.          Significant Labs:  All labs within the past 24 hours have been reviewed.     Significant Imaging:  Labs: Reviewed  Assessment/Plan:     Cardiac/Vascular  Atrial fibrillation  -management per primary     Renal/  ESRD (end stage renal disease)  S/p R thoracotomy due to RML carcinoid tumor. On HD ~ 1 year. Last HD prior to presentation 1/1/24. Pt has NELY AVF placed on 10/23 that has not been cleared by vascular. Currently using RIJ TDC.     During her admission: stepped up to ICU for Afib with RVR. Was started on BB, amio and digoxin. However 1/12/24, had  multiple episodes of eulogio with few asystole requiring chest compressions. Digoxin level elevated and dig-meaghan was given with resulting decrease in levels.     Nephrology History  iHD Schedule: TTS   Unit/MD: [facility] Hinds/ Dr. Vyas  Duration: 3.5 hours   EDW: 58 kg   Access: RIJ TDC  Residual Renal Function: minimal       Volume status: net positive this admission (records indicate 5 liters)    Assessment:   -given cardiac events yesterday, will start on SLED/SCUF 6 hours and 6 hours respectively with net UF goal of 150 cc/hr.  -Keep MAP > 65  -Keep hemoglobin > 7  -Strict ins and outs  -Avoid nephrotoxic agents if possible and renally dose medications  -Avoid drastic hemodynamic changes if possible          Thank you for your consult. I will follow-up with patient. Please contact us if you have any additional questions.    Johnson Hernandez MD  Nephrology  [facility] - [facility]

## 2024-01-13 NOTE — PROGRESS NOTES
VANCOMYCIN DOSING BY PHARMACY DISCONTINUATION NOTE    Lily Green is a 73 y.o. female who had been consulted for vancomycin dosing.    The pharmacy consult for vancomycin dosing has been discontinued.     Vancomycin Dosing by Pharmacy Consult will sign-off. Please reconsult if necessary. Thank you for allowing us to participate in this patient's care.       Yahir Borges, PharmD  Ext [phone]

## 2024-01-13 NOTE — SUBJECTIVE & OBJECTIVE
Interval History: Patient seen and examined this morning. Eventful 24hrs with bradycardia leading to asystole x3. ROSC obtained and femoral transvenous catheter placed. Hemodynamically stable post-procedurally. Plan for CT scan today to assess right thorax, lung.     Medications:  Continuous Infusions:   sodium chloride 0.9%      heparin (porcine) in D5W 16 Units/kg/hr (01/13/24 0900)     Scheduled Meds:   acetylcysteine 100 mg/ml (10%)  4 mL Nebulization TID WAKE    albuterol sulfate  2.5 mg Nebulization TID WAKE    [START ON 1/14/2024] droNABinol  2.5 mg Oral BID    epoetin noble (PROCRIT) injection  4,000 Units Intravenous Every Mon, Wed, Fri    LIDOcaine  1 patch Transdermal Q24H    metoclopramide  5 mg Intravenous Q6H    pantoprazole  40 mg Oral Daily    piperacillin-tazobactam (Zosyn) IV (PEDS and ADULTS) (extended infusion is not appropriate)  4.5 g Intravenous Q12H    sevelamer carbonate  1,600 mg Oral TID WM     PRN Meds:sodium chloride 0.9%, bisacodyL, dextrose 10%, dextrose 10%, glucagon (human recombinant), glucose, glucose, heparin (porcine), heparin (PORCINE), heparin (PORCINE), hydrALAZINE, insulin aspart U-100, magnesium sulfate IVPB, ondansetron, sodium chloride 0.9%, sodium phosphate 20.01 mmol in dextrose 5 % (D5W) 250 mL IVPB, sodium phosphate 30 mmol in dextrose 5 % (D5W) 250 mL IVPB, sodium phosphate 39.99 mmol in dextrose 5 % (D5W) 250 mL IVPB     Review of patient's allergies indicates:   Allergen Reactions    Crestor [rosuvastatin] Swelling    Gluten protein      Objective:     Vital Signs (Most Recent):  Temp: 97.7 °F (36.5 °C) (01/13/24 0700)  Pulse: 79 (01/13/24 0900)  Resp: (!) 26 (01/13/24 0900)  BP: 130/60 (01/13/24 0900)  SpO2: 100 % (01/13/24 0900) Vital Signs (24h Range):  Temp:  [97.6 °F (36.4 °C)-98.6 °F (37 °C)] 97.7 °F (36.5 °C)  Pulse:  [] 79  Resp:  [17-38] 26  SpO2:  [95 %-100 %] 100 %  BP: ()/(55-89) 130/60  Arterial Line BP: (107-168)/(48-70) 133/56      Intake/Output - Last 3 Shifts         01/11 0700  01/12 0659 01/12 0700 01/13 0659 01/13 0700 01/14 0659    I.V. (mL/kg) 637.9 (9.2) 327.4 (4.8) 59.7 (0.9)    Other 300      IV Piggyback 387.3 216.4 0.2    Total Intake(mL/kg) 1325.2 (19.2) 543.8 (7.9) 59.9 (0.9)    Urine (mL/kg/hr) 0 (0)      Other 1550      Total Output 1550      Net -224.8 +543.8 +59.9           Urine Occurrence 1 x      Stool Occurrence 1 x              SpO2: 100 %        Physical Exam  Vitals and nursing note reviewed.   Constitutional:       Appearance: She is ill-appearing.   HENT:      Nose: Nose normal.   Cardiovascular:      Rate and Rhythm: Normal rate and regular rhythm.      Pulses: Normal pulses.   Pulmonary:      Effort: Pulmonary effort is normal. No respiratory distress.   Abdominal:      General: Abdomen is flat. There is no distension.      Palpations: Abdomen is soft.      Tenderness: There is no abdominal tenderness.      Comments: Soft, nontender abdomen   Genitourinary:     Comments: TVP wire in place  Skin:     General: Skin is warm and dry.   Neurological:      General: No focal deficit present.      Mental Status: She is alert.            Significant Labs:  CBC:   Recent Labs   Lab 01/13/24  0325   WBC 18.98*   RBC 2.92*   HGB 8.4*   HCT 22.5*      MCV 77*   MCH 28.8   MCHC 37.3*     CMP:   Recent Labs   Lab 01/13/24  0325   *   CALCIUM 8.8   ALBUMIN 2.1*   PROT 5.9*      K 4.4   CO2 24      BUN 47*   CREATININE 4.6*   ALKPHOS 186*   ALT 9*   AST 21   BILITOT 0.7       Significant Diagnostics:  I have reviewed all pertinent imaging results/findings within the past 24 hours.    VTE Risk Mitigation (From admission, onward)           Ordered     heparin 25,000 units in dextrose 5% 250 mL (100 units/mL) infusion LOW INTENSITY nomogram - [facility]  Continuous        Question:  Begin at (units/kg/hr)  Answer:  12 01/12/24 5138     heparin 25,000 units in dextrose 5% (100 units/ml) IV bolus from bag -  ADDITIONAL PRN BOLUS - 60 units/kg  As needed (PRN)        Question:  Heparin Infusion Adjustment (DO NOT MODIFY ANSWER)  Answer:  [email or web address]\epic\Images\Pharmacy\HeparinInfusions\heparin LOW INTENSITY nomogram for [facility] [id number]    01/12/24 1358     heparin 25,000 units in dextrose 5% (100 units/ml) IV bolus from bag - ADDITIONAL PRN BOLUS - 30 units/kg  As needed (PRN)        Question:  Heparin Infusion Adjustment (DO NOT MODIFY ANSWER)  Answer:  [email or web address]\epic\Images\Pharmacy\HeparinInfusions\heparin LOW INTENSITY nomogram for [facility] [id number].pdf    01/12/24 1358     heparin (porcine) injection 1,000 Units  As needed (PRN)         01/11/24 1351     heparin (porcine) injection 1,000 Units  As needed (PRN)         01/03/24 1123     IP VTE HIGH RISK PATIENT  Once         01/02/24 1226     Place CARMELITA hose  Until discontinued         01/02/24 1226     Place sequential compression device  Until discontinued         01/02/24 1226

## 2024-01-13 NOTE — PROGRESS NOTES
[facility] - [facility] Intensive Care  Critical Care - Surgery  Progress Note    Patient Name: Lily Green  MRN: [id number]  Admission Date: 1/2/2024  Hospital Length of Stay: 11 days  Code Status: Full Code  Attending Provider: Tan Thomson MD  Primary Care Provider: Pavel Calixto MD   Principal Problem: Malignant carcinoid tumor of bronchus    Subjective:   Interval History/Significant Events: TVP placed yesterday uneventfully.  Was paced at 70 overnight, paced a few times but because of high pacing rate, it is unclear what the intrinsic rhythm is.  Have decreased the rate to 40.  Otherwise, is a little anxious at bedside this morning, but has been hemodynamically stable and on 3 L nasal cannula    Follow-up For: Procedure(s) (LRB):  LYMPHADENECTOMY (Right)  THORACOTOMY (Right)  THORACOTOMY, WITH INITIAL THERAPEUTIC WEDGE RESECTION OF LUNG (Right)    Post-Operative Day: 11 Days Post-Op    Objective:     Vital Signs (Most Recent):  Temp: 97.7 °F (36.5 °C) (01/13/24 0700)  Pulse: 79 (01/13/24 1015)  Resp: (!) 22 (01/13/24 1015)  BP: (!) 144/67 (01/13/24 1000)  SpO2: (!) 94 % (01/13/24 1015) Vital Signs (24h Range):  Temp:  [97.6 °F (36.4 °C)-98.6 °F (37 °C)] 97.7 °F (36.5 °C)  Pulse:  [] 79  Resp:  [17-38] 22  SpO2:  [94 %-100 %] 94 %  BP: ()/(55-89) 144/67  Arterial Line BP: (107-168)/(48-70) 164/65     Weight: 68.9 kg (152 lb)  Body mass index is 25.29 kg/m².      Intake/Output Summary (Last 24 hours) at 1/13/2024 1026  Last data filed at 1/13/2024 1000  Gross per 24 hour   Intake 547.89 ml   Output --   Net 547.89 ml            Physical Exam  Constitutional:       Appearance: Normal appearance.   Cardiovascular:      Rate and Rhythm: Normal rate and regular rhythm.      Pulses: Normal pulses.   Pulmonary:      Effort: Pulmonary effort is normal.   Abdominal:      General: Abdomen is flat. There is no distension.      Palpations: Abdomen is soft.      Tenderness: There is no abdominal  tenderness.      Comments: Soft, nontender abdomen   Genitourinary:     Comments: TVP wire in place  Skin:     General: Skin is warm and dry.   Neurological:      General: No focal deficit present.      Mental Status: She is alert.            Vents:  Oxygen Concentration (%): 32 (01/13/24 0015)    Lines/Drains/Airways       Central Venous Catheter Line  Duration                  Hemodialysis Catheter 01/25/23 1501 right internal jugular 352 days    Introducer 01/12/24 1100 Femoral Vein Right;Femoral Right <1 day              Arterial Line  Duration             Arterial Line 01/08/24 1324 Right Radial 4 days              Line  Duration                  Pacer Wires 01/12/24 1032 <1 day              Peripheral Intravenous Line  Duration                  Hemodialysis AV Fistula 09/01/23 0800 Left forearm 134 days         Peripheral IV - Single Lumen 01/08/24 1108 22 G Posterior;Right Wrist 4 days         Peripheral IV - Single Lumen 01/08/24 1357 20 G Posterior;Right Forearm 4 days         Peripheral IV - Single Lumen 01/12/24 1415 20 G;1 3/4 in Right Forearm <1 day                    Significant Labs:    CBC/Anemia Profile:  Recent Labs   Lab 01/12/24  0422 01/12/24  0718 01/13/24  0325   WBC 15.59*  --  18.98*   HGB 8.9*  --  8.4*   HCT 24.5* 27* 22.5*     --  308   MCV 80*  --  77*   RDW 18.1*  --  18.0*          Chemistries:  Recent Labs   Lab 01/12/24  0422 01/12/24  0631 01/12/24  1316 01/12/24  2146 01/13/24  0325    139 141 141 141   K 4.1 4.1 4.2 4.4 4.4    102 101 101 100   CO2 21* 20* 22* 23 24   BUN 35* 34* 38* 45* 47*   CREATININE 3.5* 3.6* 4.0* 4.4* 4.6*   CALCIUM 8.6* 8.3* 8.6* 8.8 8.8   ALBUMIN 2.2* 2.2* 2.1* 2.1* 2.1*   PROT 6.2 6.2  --   --  5.9*   BILITOT 0.8 0.9  --   --  0.7   ALKPHOS 231* 226*  --   --  186*   ALT 16 19  --   --  9*   AST 35 32  --   --  21   MG 2.4 2.3 2.4 2.5 2.6   PHOS 4.2 4.4 4.9* 5.6* 5.6*         All pertinent labs within the past 24 hours have been  reviewed.    Significant Imaging:  I have reviewed all pertinent imaging results/findings within the past 24 hours.  Assessment/Plan:     Cardiac/Vascular  Atrial fibrillation  74 yo female with 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 Stepped up to the unit for persistent afib rvr. Had SBO (now resolved). 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       Neuro/Psych:   -- Sedation: none  -- Pain:  Lidocaine patch   -- has some baseline anxiety, no longer on meds     Cards:   -- BP goals SBP >90, MAPs >55   - Off phenylephrine  -- Afib RVR   - Amio transitioned to PO, now held post cardiac arrest  -- Asystolic arrest   - morning of 1/12/24 patient went into asystole. Had multiple bradycardic events overnight which had self resolved. At time of asystole, nurse performed 1 min of chest compressions and achieved ROSC.EP notified and presented for STAT TVP placement. Access unable to be obtained due to central venous stenosis. 2nd episode of asystole. Resolved after minimal compressions. 3rd episode of asystole resolved with singular compression. Patient brought to Cath lab for TVP placed via groin.   - TVP placed @ back up rate of 40   - will likely require micra leadless PPM   - digoxin level high --> administered digibind 1/12, transitioned to normal sinus after administration   - dig level this morning is within normal limits     Pulm:   -- Goal O2 sat > 90%  -- O2 req to 3L, satting upper 90s.  -- Chest tube d/c'd 1/8/24  -- FU CXR-- pleural effusions 1/10   - CXR 1/11 showing RLL opacification  -- CT chest ordered this morning by thoracic team, we will follow-up results      Renal:  -- history of ESRD, dialysis dependent.  Monday Wednesday Friday schedule while in the hospital.   - CRRT scheduled for tonight, planning to remove 1-2 L  -- trend BUN/Cr  -- patient is anuric  -- permacath in  place last exchanged 10/2023  -- fistula unable to be used    Recent Labs   Lab 01/12/24  1316 01/12/24  2146 01/13/24  0325   BUN 38* 45* 47*   CREATININE 4.0* 4.4* 4.6*           FEN / GI:   -- CT on 1/8/24 demonstrating SBO  -- US on 1/8/24 showing distended gallbladder without signs of acute cholecystitis   - NPO  - NGT removed.  - Equivocal GGC 1/10, added scheduled reglan for gastric motility and suppository this morning. Will likely dc NGT today if bowel movement   - BM overnight  -- Replace lytes as needed  -- Nutrition:  We will work to advance diet today, need to follow up if patient can sit upright in order to drink liquids.  Aspiration precautions are of utmost importance. Dietician consult placed for complex dietary restriction      ID:   -- Tm: afebrile; WBC 18.9  -- MRSA neg, dc vanc  -- Cont zosyn  -- blood cultures with 1 bottle growing staph epi, likely a contaminant.  Recent Labs   Lab 01/11/24  0358 01/12/24  0422 01/13/24  0325   WBC 15.24* 15.59* 18.98*           Heme/Onc:  -- Daily CBC  -- on heparin gtt for A-fib  Recent Labs   Lab 01/11/24  0358 01/12/24  0422 01/12/24  0631 01/12/24  1316 01/12/24  2146 01/13/24  0325 01/13/24  0428   HGB 9.0* 8.9*  --   --   --  8.4*  --     312  --   --   --  308  --    APTT 39.0* 33.9* 62.5* 24.9 34.9*  --  47.2*   INR 1.2 1.2 1.2  --   --   --   --            Endo:   -- history of diabetes  -- Gluc goal 140-180  -- SSI      PPx:   Feeding:  NPO  Analgesia/Sedation:  Pain is controlled, no sedation  Thromboembolic prevention: heparin   HOB >30: yes  Stress Ulcer ppx: pantoprazole IV  Glucose control: Critical care goal 140-180 g/dl, SSI  Bowel reg:  having BM  Invasive Lines/Drains/Airway: Permacath, PIV x2, TVP groin line  Deescalation: none        Dispo/Code Status/Palliative:   -- SICU/ Full Code            Brooklyn Sapp MD  Critical Care - Surgery  [facility] - Surgical Intensive Care

## 2024-01-13 NOTE — SUBJECTIVE & OBJECTIVE
Interval History: Multiple episodes of asystole requiring, cardiology suspected dig toxicity with possible vagal component, given digifab at 1600.     Review of patient's allergies indicates:   Allergen Reactions    Crestor [rosuvastatin] Swelling    Gluten protein      Current Facility-Administered Medications   Medication Frequency    0.9%  NaCl infusion PRN    acetylcysteine 100 mg/ml (10%) solution 4 mL TID WAKE    albuterol sulfate nebulizer solution 2.5 mg TID WAKE    bisacodyL suppository 10 mg Daily PRN    dextrose 10% bolus 125 mL 125 mL PRN    dextrose 10% bolus 250 mL 250 mL PRN    epoetin noble injection 4,000 Units Every Mon, Wed, Fri    glucagon (human recombinant) injection 1 mg PRN    glucose chewable tablet 16 g PRN    glucose chewable tablet 24 g PRN    heparin (porcine) injection 1,000 Units PRN    heparin 25,000 units in dextrose 5% (100 units/ml) IV bolus from bag - ADDITIONAL PRN BOLUS - 30 units/kg PRN    heparin 25,000 units in dextrose 5% (100 units/ml) IV bolus from bag - ADDITIONAL PRN BOLUS - 60 units/kg PRN    heparin 25,000 units in dextrose 5% 250 mL (100 units/mL) infusion LOW INTENSITY nomogram - [facility] Continuous    hydrALAZINE injection 10 mg Q6H PRN    insulin aspart U-100 pen 0-10 Units Q4H PRN    LIDOcaine 5 % patch 1 patch Q24H    metoclopramide injection 5 mg Q6H    ondansetron injection 8 mg Q8H PRN    pantoprazole EC tablet 40 mg Daily    piperacillin-tazobactam (ZOSYN) 4.5 g in dextrose 5 % in water (D5W) 100 mL IVPB (MB+) Q12H    sevelamer carbonate tablet 1,600 mg TID WM    sodium chloride 0.9% flush 10 mL PRN    vancomycin - pharmacy to dose pharmacy to manage frequency       Objective:     Vital Signs (Most Recent):  Temp: 97.6 °F (36.4 °C) (01/12/24 2300)  Pulse: 80 (01/13/24 0748)  Resp: 20 (01/13/24 0748)  BP: (!) 141/69 (01/13/24 0700)  SpO2: 99 % (01/13/24 0748) Vital Signs (24h Range):  Temp:  [97.6 °F (36.4 °C)-98.6 °F (37 °C)] 97.6 °F (36.4 °C)  Pulse:  [0-265]  80  Resp:  [17-50] 20  SpO2:  [95 %-100 %] 99 %  BP: ()/(55-89) 141/69  Arterial Line BP: (106-168)/(48-70) 142/59     Weight: 68.9 kg (152 lb) (01/12/24 0900)  Body mass index is 25.29 kg/m².  Body surface area is 1.78 meters squared.    I/O last 3 completed shifts:  In: 711.9 [I.V.:408.8; IV Piggyback:303.1]  Out: -      Physical Exam  Constitutional:       General: She is not in acute distress.     Appearance: She is ill-appearing. She is not toxic-appearing.   HENT:      Head: Normocephalic and atraumatic.   Cardiovascular:      Heart sounds: Murmur heard.      Comments: Right permcath  Musculoskeletal:      Right lower leg: Edema present.      Left lower leg: Edema present.   Neurological:      Mental Status: She is alert.          Significant Labs:  All labs within the past 24 hours have been reviewed.     Significant Imaging:  Labs: Reviewed

## 2024-01-13 NOTE — SUBJECTIVE & OBJECTIVE
Interval History/Significant Events: TVP placed yesterday uneventfully.  Was paced at 70 overnight, paced a few times but because of high rate is unclear with the intrinsic rhythm is like.  Have decreased the rate to 40.  Otherwise, is a little anxious at bedside this morning, but has been hemodynamically stable and on 3 L nasal cannula    Follow-up For: Procedure(s) (LRB):  LYMPHADENECTOMY (Right)  THORACOTOMY (Right)  THORACOTOMY, WITH INITIAL THERAPEUTIC WEDGE RESECTION OF LUNG (Right)    Post-Operative Day: 11 Days Post-Op    Objective:     Vital Signs (Most Recent):  Temp: 97.7 °F (36.5 °C) (01/13/24 0700)  Pulse: 79 (01/13/24 1015)  Resp: (!) 22 (01/13/24 1015)  BP: (!) 144/67 (01/13/24 1000)  SpO2: (!) 94 % (01/13/24 1015) Vital Signs (24h Range):  Temp:  [97.6 °F (36.4 °C)-98.6 °F (37 °C)] 97.7 °F (36.5 °C)  Pulse:  [] 79  Resp:  [17-38] 22  SpO2:  [94 %-100 %] 94 %  BP: ()/(55-89) 144/67  Arterial Line BP: (107-168)/(48-70) 164/65     Weight: 68.9 kg (152 lb)  Body mass index is 25.29 kg/m².      Intake/Output Summary (Last 24 hours) at 1/13/2024 1026  Last data filed at 1/13/2024 1000  Gross per 24 hour   Intake 547.89 ml   Output --   Net 547.89 ml            Physical Exam  Constitutional:       Appearance: Normal appearance.   HENT:      Nose:      Comments: NGT in place  Cardiovascular:      Rate and Rhythm: Normal rate and regular rhythm.      Pulses: Normal pulses.   Pulmonary:      Effort: Pulmonary effort is normal.   Abdominal:      General: Abdomen is flat. There is no distension.      Palpations: Abdomen is soft.      Tenderness: There is no abdominal tenderness.      Comments: Soft, nontender abdomen   Genitourinary:     Comments: TVP wire in place  Skin:     General: Skin is warm and dry.   Neurological:      General: No focal deficit present.      Mental Status: She is alert.            Vents:  Oxygen Concentration (%): 32 (01/13/24 0015)    Lines/Drains/Airways       Central  Venous Catheter Line  Duration                  Hemodialysis Catheter 01/25/23 1501 right internal jugular 352 days    Introducer 01/12/24 1100 Femoral Vein Right;Femoral Right <1 day              Arterial Line  Duration             Arterial Line 01/08/24 1324 Right Radial 4 days              Line  Duration                  Pacer Wires 01/12/24 1032 <1 day              Peripheral Intravenous Line  Duration                  Hemodialysis AV Fistula 09/01/23 0800 Left forearm 134 days         Peripheral IV - Single Lumen 01/08/24 1108 22 G Posterior;Right Wrist 4 days         Peripheral IV - Single Lumen 01/08/24 1357 20 G Posterior;Right Forearm 4 days         Peripheral IV - Single Lumen 01/12/24 1415 20 G;1 3/4 in Right Forearm <1 day                    Significant Labs:    CBC/Anemia Profile:  Recent Labs   Lab 01/12/24  0422 01/12/24  0718 01/13/24  0325   WBC 15.59*  --  18.98*   HGB 8.9*  --  8.4*   HCT 24.5* 27* 22.5*     --  308   MCV 80*  --  77*   RDW 18.1*  --  18.0*          Chemistries:  Recent Labs   Lab 01/12/24  0422 01/12/24  0631 01/12/24  1316 01/12/24  2146 01/13/24  0325    139 141 141 141   K 4.1 4.1 4.2 4.4 4.4    102 101 101 100   CO2 21* 20* 22* 23 24   BUN 35* 34* 38* 45* 47*   CREATININE 3.5* 3.6* 4.0* 4.4* 4.6*   CALCIUM 8.6* 8.3* 8.6* 8.8 8.8   ALBUMIN 2.2* 2.2* 2.1* 2.1* 2.1*   PROT 6.2 6.2  --   --  5.9*   BILITOT 0.8 0.9  --   --  0.7   ALKPHOS 231* 226*  --   --  186*   ALT 16 19  --   --  9*   AST 35 32  --   --  21   MG 2.4 2.3 2.4 2.5 2.6   PHOS 4.2 4.4 4.9* 5.6* 5.6*         All pertinent labs within the past 24 hours have been reviewed.    Significant Imaging:  I have reviewed all pertinent imaging results/findings within the past 24 hours.

## 2024-01-13 NOTE — NURSING
CT called at 1030 and 1415 to try and get a time for STAT CT ordered this AM. Told both times awaiting Radiologist to protocol contrast.

## 2024-01-13 NOTE — ASSESSMENT & PLAN NOTE
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 On HD ~ 1 year. Last HD prior to presentation 1/1/24. Pt has NELY AVF placed on 10/23 that has not been cleared by vascular. Currently using RIJ TDC.     During her admission: stepped up to ICU for Afib with RVR. Was started on BB, amio and digoxin. However 1/12/24, had multiple episodes of eulgoio with few asystole requiring chest compressions. Digoxin level elevated and dig-meaghan was given with resulting decrease in levels.     Nephrology History  iHD Schedule: TTS   Unit/MD: [facility]/ Dr. Vyas  Duration: 3.5 hours   EDW: 58 kg   Access: RIJ TDC  Residual Renal Function: minimal       Volume status: net positive this admission (records indicate 5 liters)    Assessment:   -given cardiac events yesterday, will start on SLED/SCUF 6 hours and 6 hours respectively with net UF goal of 150 cc/hr.  -Keep MAP > 65  -Keep hemoglobin > 7  -Strict ins and outs  -Avoid nephrotoxic agents if possible and renally dose medications  -Avoid drastic hemodynamic changes if possible

## 2024-01-13 NOTE — ASSESSMENT & PLAN NOTE
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 Stepped up to the ICU 1/5 with afib RVR refractory to IV metoprolol, started on amio gtt with conversion.     - patient seen and examined this morning   - bradycardia yesterday resulting in asystole x3. Transvenous pacing wires placed via femoral vein. Has converted to normal sinus rhythm overnight, not requiring consistent pacing. Cardiology involved, appreciate their assistance  - plan for CT chest with contrast today  - NPO; ok to advance diet from thoracic surgery standpoint following CT scan  - Nephrology following, appreciate assistance. Planning for dialysis today  - daily CMP, CBC   - Appreciate SICU assistance

## 2024-01-13 NOTE — PLAN OF CARE
SICU PLAN OF CARE NOTE    Dx: Malignant carcinoid tumor of bronchus    Goals of Care: Map >55 SBP>90    Vital Signs (last 12 hours):   Temp:  [97.6 °F (36.4 °C)-98.3 °F (36.8 °C)]   Pulse:  []   Resp:  [20-38]   BP: (120-143)/(57-69)   SpO2:  [96 %-100 %]   Arterial Line BP: (107-167)/(48-70)      Neuro: AAO x4, Follows Commands, and Moves All Extremities    Cardiac: NSR    Respiratory: Nasal Cannula    Gtts: Heparin    Urine Output: Voids Spontaneously Oliguric, Purewick in place    Diet: NPO and Sips with Meds     Labs/Accuchecks:  at highest, covered with sliding scale, Digoxin level 1.5    Skin:    Patient turned Q2,  mattress inflated, and bed working correctly. SCDs on and foams applied.    Shift Events:  Pacer wires intact and pacer at bedside.Plan to have HD today. See flowsheet for further assessment/details.  MD updated on current condition, vitals, labs, and gtts.  No new orders received, will continue to monitor.

## 2024-01-13 NOTE — PLAN OF CARE
Please merge this note with today's resident critical care note.    SICU Staff Addendum  I have reviewed and concur with the resident's/NP's history, physical, assessment, and plan.  I have personally interviewed and examined the patient at bedside.  See below for any additional findings/changes.    Reason for admission:  Malignant carcinoid tumor of bronchus  Present on Admission:   ESRD (end stage renal disease)   Type 2 diabetes mellitus with diabetic polyneuropathy, without long-term current use of insulin   Malignant carcinoid tumor of bronchus   Anuria   A-V fistula      Active issues/Goals for Today:     74 y/o F now POD 11 s/p RML wedge resection for RML carcinoid tumor. Post-op course complicated by AF with RVR requiring ICU admission on 1/5 and SBO. 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     - Pain is controlled on current regimen. Minimizing opioids in setting of SBO.  - Tachy-eulogio syndrome Now with tachy-eulogio syndrome and asystolic arrest x3 s/p s/p emergent TVP placement on 1/12. Dig toxicity could be contributing. She received digifab. EP recs noted and appreciated.  - Wean supplemental oxygen as able.  - CT today as per primary.  - SBO has resolved. Will advance diet slowly over the next 24 hours.  - ESRD. Intermittent HD/CRRT as per nephrology. Would strongly suggest fluid removal today.  - Heparin drip in the setting of AF.  - PT/OT  - OOB.  - Dispo : SICU for now due to TVP.    40 minutes of critical care time was spent personally by me on the following activities: development of treatment plan with patient or surrogate and bedside caregivers, discussions with consultants, evaluation of patient's response to treatment, examination of patient, ordering and performing treatments and interventions, ordering and review of laboratory studies, ordering and review of radiographic studies, pulse oximetry, re-evaluation of patient's condition.  This  critical care time did not overlap with that of any other provider or involve time for any procedures.    Wayne Landon MD  Anesthesiology/Critical Care

## 2024-01-13 NOTE — PROGRESS NOTES
[facility]  Thoracic Surgery  Progress Note    Subjective:     History of Present Illness:  No notes on file    Post-Op Info:  Procedure(s) (LRB):  Pacemaker, Temporary, Transvenous (Right)   1 Day Post-Op     Interval History: Patient seen and examined this morning. Eventful 24hrs with bradycardia leading to asystole x3. ROSC obtained and femoral transvenous catheter placed. Hemodynamically stable post-procedurally. Plan for CT scan today to assess right thorax, lung.     Medications:  Continuous Infusions:   sodium chloride 0.9%      heparin (porcine) in D5W 16 Units/kg/hr (01/13/24 0900)     Scheduled Meds:   acetylcysteine 100 mg/ml (10%)  4 mL Nebulization TID WAKE    albuterol sulfate  2.5 mg Nebulization TID WAKE    [START ON 1/14/2024] droNABinol  2.5 mg Oral BID    epoetin noble (PROCRIT) injection  4,000 Units Intravenous Every Mon, Wed, Fri    LIDOcaine  1 patch Transdermal Q24H    metoclopramide  5 mg Intravenous Q6H    pantoprazole  40 mg Oral Daily    piperacillin-tazobactam (Zosyn) IV (PEDS and ADULTS) (extended infusion is not appropriate)  4.5 g Intravenous Q12H    sevelamer carbonate  1,600 mg Oral TID WM     PRN Meds:sodium chloride 0.9%, bisacodyL, dextrose 10%, dextrose 10%, glucagon (human recombinant), glucose, glucose, heparin (porcine), heparin (PORCINE), heparin (PORCINE), hydrALAZINE, insulin aspart U-100, magnesium sulfate IVPB, ondansetron, sodium chloride 0.9%, sodium phosphate 20.01 mmol in dextrose 5 % (D5W) 250 mL IVPB, sodium phosphate 30 mmol in dextrose 5 % (D5W) 250 mL IVPB, sodium phosphate 39.99 mmol in dextrose 5 % (D5W) 250 mL IVPB     Review of patient's allergies indicates:   Allergen Reactions    Crestor [rosuvastatin] Swelling    Gluten protein      Objective:     Vital Signs (Most Recent):  Temp: 97.7 °F (36.5 °C) (01/13/24 0700)  Pulse: 79 (01/13/24 0900)  Resp: (!) 26 (01/13/24 0900)  BP: 130/60 (01/13/24 0900)  SpO2: 100 % (01/13/24 0900) Vital  Signs (24h Range):  Temp:  [97.6 °F (36.4 °C)-98.6 °F (37 °C)] 97.7 °F (36.5 °C)  Pulse:  [] 79  Resp:  [17-38] 26  SpO2:  [95 %-100 %] 100 %  BP: ()/(55-89) 130/60  Arterial Line BP: (107-168)/(48-70) 133/56     Intake/Output - Last 3 Shifts         01/11 0700 01/12 0659 01/12 0700 01/13 0659 01/13 0700 01/14 0659    I.V. (mL/kg) 637.9 (9.2) 327.4 (4.8) 59.7 (0.9)    Other 300      IV Piggyback 387.3 216.4 0.2    Total Intake(mL/kg) 1325.2 (19.2) 543.8 (7.9) 59.9 (0.9)    Urine (mL/kg/hr) 0 (0)      Other 1550      Total Output 1550      Net -224.8 +543.8 +59.9           Urine Occurrence 1 x      Stool Occurrence 1 x              SpO2: 100 %        Physical Exam  Vitals and nursing note reviewed.   Constitutional:       Appearance: She is ill-appearing.   HENT:      Nose: Nose normal.   Cardiovascular:      Rate and Rhythm: Normal rate and regular rhythm.      Pulses: Normal pulses.   Pulmonary:      Effort: Pulmonary effort is normal. No respiratory distress.   Abdominal:      General: Abdomen is flat. There is no distension.      Palpations: Abdomen is soft.      Tenderness: There is no abdominal tenderness.      Comments: Soft, nontender abdomen   Genitourinary:     Comments: TVP wire in place  Skin:     General: Skin is warm and dry.   Neurological:      General: No focal deficit present.      Mental Status: She is alert.            Significant Labs:  CBC:   Recent Labs   Lab 01/13/24  0325   WBC 18.98*   RBC 2.92*   HGB 8.4*   HCT 22.5*      MCV 77*   MCH 28.8   MCHC 37.3*     CMP:   Recent Labs   Lab 01/13/24  0325   *   CALCIUM 8.8   ALBUMIN 2.1*   PROT 5.9*      K 4.4   CO2 24      BUN 47*   CREATININE 4.6*   ALKPHOS 186*   ALT 9*   AST 21   BILITOT 0.7       Significant Diagnostics:  I have reviewed all pertinent imaging results/findings within the past 24 hours.    VTE Risk Mitigation (From admission, onward)           Ordered     heparin 25,000 units in dextrose  5% 250 mL (100 units/mL) infusion LOW INTENSITY nomogram - [facility]  Continuous        Question:  Begin at (units/kg/hr)  Answer:  12    01/12/24 1358     heparin 25,000 units in dextrose 5% (100 units/ml) IV bolus from bag - ADDITIONAL PRN BOLUS - 60 units/kg  As needed (PRN)        Question:  Heparin Infusion Adjustment (DO NOT MODIFY ANSWER)  Answer:  \\[email or web address]\epic\Images\Pharmacy\HeparinInfusions\heparin LOW INTENSITY nomogram for [facility] [id number].pdf    01/12/24 1358     heparin 25,000 units in dextrose 5% (100 units/ml) IV bolus from bag - ADDITIONAL PRN BOLUS - 30 units/kg  As needed (PRN)        Question:  Heparin Infusion Adjustment (DO NOT MODIFY ANSWER)  Answer:  \\[email or web address]\epic\Images\Pharmacy\HeparinInfusions\heparin LOW INTENSITY nomogram for [facility] [id number].pdf    01/12/24 1358     heparin (porcine) injection 1,000 Units  As needed (PRN)         01/11/24 1351     heparin (porcine) injection 1,000 Units  As needed (PRN)         01/03/24 1123     IP VTE HIGH RISK PATIENT  Once         01/02/24 1226     Place CARMELITA hose  Until discontinued         01/02/24 1226     Place sequential compression device  Until discontinued         01/02/24 1226                  Assessment/Plan:     * Malignant carcinoid tumor of bronchus  72 yo female with ESRD (TTS), DDD, benign essential tremor, meniere's disease, HTN, HLD, DM2, GERD, Celiac's, IBS and 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 Stepped up to the ICU 1/5 with afib RVR refractory to IV metoprolol, started on amio gtt with conversion.     - patient seen and examined this morning   - bradycardia yesterday resulting in asystole x3. Transvenous pacing wires placed via femoral vein. Has converted to normal sinus rhythm overnight, not requiring consistent pacing. Cardiology involved, appreciate their assistance  - plan for CT chest with contrast today  - NPO; ok to advance diet from thoracic  surgery standpoint following CT scan  - Nephrology following, appreciate assistance. Planning for dialysis today  - daily CMP, CBC   - Appreciate SICU assistance         Pranav Vega MD  Thoracic Surgery  [facility]

## 2024-01-13 NOTE — NURSING
HR dropped to 48. Asymptomatic. Pt was voiding during event. This is 3rd event today while urinating. Pt denies straining to void.

## 2024-01-13 NOTE — NURSING
SICU PLAN OF CARE NOTE    Dx: Malignant carcinoid tumor of bronchus    Vital Signs (last 12 hours):   Temp:  [97.7 °F (36.5 °C)]   Pulse:  []   Resp:  [16-37]   BP: (107-159)/(58-77)   SpO2:  [88 %-100 %]   Arterial Line BP: (116-168)/(53-72)      Goals of Care: MAP >55; Chest CT when CT able to take patient    Neuro: AAO x4, Follows Commands, and Moves All Extremities    Cardiac: NSR/Afib/SB/paced rhythm    Respiratory: Nasal Cannula 1 L    Gtts: Heparin    Urine Output: Voids Spontaneously. Oliguric 50 cc/shift. CRRT    Diet: CLD w Supplements     Labs/Accuchecks: ac hs and FRP q8    Skin:  All skin remains free from injury.  Patient turned Q2,  mattress inflated, and bed working correctly. Heels offloaded.     Shift Events:  BU rate on Pacer turned to 40 bpm with 4 episodes of bradycardia while patient says she is urinating. See notes

## 2024-01-13 NOTE — PROCEDURES
"Lily Green is a 73 y.o. female patient.    Temp: 97.6 °F (36.4 °C) (01/12/24 2300)  Pulse: 81 (01/13/24 0630)  Resp: (!) 26 (01/13/24 0630)  BP: 107/60 (01/13/24 0600)  SpO2: 98 % (01/13/24 0630)  Weight: 68.9 kg (152 lb) (01/12/24 0900)  Height: 5' 5" (165.1 cm) (01/12/24 0900)  Mallampati Scale: Class II  ASA Classification: Class 3    Central Line    Date/Time: 1/13/2024 7:00 AM    Performed by: Gillies, Connor M, MD  Authorized by: Gillies, Connor M, MD    Location procedure was performed:  [facility] ICU (SICU)  Assisting Provider:  Wayne Carrion MD  Consent Done ?:  Emergent Situation  Time out complete?: Verified correct patient, procedure, equipment, staff, and site/side    Indications:  Hemodynamic monitoring  Anesthesia:  Local infiltration  Local anesthetic:  Lidocaine 1% without epinephrine  Preparation:  Skin prepped with ChloraPrep  Skin prep agent dried: Skin prep agent completely dried prior to procedure    Sterile barriers: All five maximal sterile barriers used - gloves, gown, cap, mask and large sterile sheet    Hand hygiene: Hand hygiene performed immediately prior to central venous catheter insertion    Location:  Left internal jugular  Catheter type:  Cordis  Catheter size:  6 Fr  Ultrasound guidance: Yes    Needle advanced into vessel with real time ultrasound guidance.    Guidewire confirmed in vessel.    Steril sheath on probe.    Sterile gel used.  Manometry: Yes    Number of attempts:  3  Other Complications:  Attempted access for cordis placement for TVP. Vascular access (venous confirmed with US, manometry) in the IJ was obtained multiple times but unable to pass wire on each attempt. I attempted as well as the assisting provider, but given her hx of venous stenoses in the area, procedure aborted. Vessel was not dilated at all. Wires removed intact and pressure was held for hemostasis. No hematoma at 20 minutes post procedure.   Attempted access for cordis " placement for TVP. Vascular access (venous confirmed with US, manometry) in the IJ was obtained multiple times but unable to pass wire on each attempt. I attempted as well as the assisting provider, but given her hx of venous stenoses in the area, procedure aborted. Vessel was not dilated at all. Wires removed intact and pressure was held for hemostasis. No hematoma at 20 minutes post procedure.      1/13/2024

## 2024-01-13 NOTE — NURSING
1330 Patients HR reading 30 on monitor. MAP dropped to 40s. Very quickly TVP capturing HR of 40 then patients intrinsic rate Afib 140. Dr. Sapp notified. Continue to monitor. HR currently 122 Afib. Pt again reports urinating during event.

## 2024-01-13 NOTE — ASSESSMENT & PLAN NOTE
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 Stepped up to the unit for persistent afib rvr. Had SBO (now resolved). 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       Neuro/Psych:   -- Sedation: none  -- Pain:  Lidocaine patch   -- has some baseline anxiety, no longer on meds     Cards:   -- BP goals SBP >90, MAPs >55   - Off phenylephrine  -- Afib RVR   - Amio transitioned to PO, now held post cardiac arrest  -- Asystolic arrest   - morning of 1/12/24 patient went into asystole. Had multiple bradycardic events overnight which had self resolved. At time of asystole, nurse performed 1 min of chest compressions and achieved ROSC.EP notified and presented for STAT TVP placement. Access unable to be obtained due to central venous stenosis. 2nd episode of asystole. Resolved after minimal compressions. 3rd episode of asystole resolved with singular compression. Patient brought to Cath lab for TVP placed via groin.   - TVP placed @ back up rate of 40   - will likely require micra leadless PPM   - digoxin level high --> administered digibind 1/12, transitioned to normal sinus after administration   - dig level this morning is within normal limits     Pulm:   -- Goal O2 sat > 90%  -- O2 req to 3L, satting upper 90s.  -- Chest tube d/c'd 1/8/24  -- FU CXR-- pleural effusions 1/10   - CXR 1/11 showing RLL opacification  -- CT chest ordered this morning by thoracic team, we will follow-up results      Renal:  -- history of ESRD, dialysis dependent.  Monday Wednesday Friday schedule while in the hospital.   - CRRT scheduled for tonight, planning to remove 1-2 L  -- trend BUN/Cr  -- patient is anuric  -- permacath in place last exchanged 10/2023  -- fistula unable to be used    Recent Labs   Lab 01/12/24  1316 01/12/24  2146 01/13/24  0325   BUN 38* 45* 47*   CREATININE 4.0* 4.4* 4.6*            FEN / GI:   -- CT on 1/8/24 demonstrating SBO  -- US on 1/8/24 showing distended gallbladder without signs of acute cholecystitis   - NPO  - NGT removed.  - Equivocal GGC 1/10, added scheduled reglan for gastric motility and suppository this morning. Will likely dc NGT today if bowel movement   - BM overnight  -- Replace lytes as needed  -- Nutrition:  We will work to advance diet today, need to follow up if patient can sit upright in order to drink liquids.  Aspiration precautions are of utmost importance. Dietician consult placed for complex dietary restriction      ID:   -- Tm: afebrile; WBC 18.9  -- MRSA neg, dc vanc  -- Cont zosyn  -- blood cultures with 1 bottle growing staph epi, likely a contaminant.  Recent Labs   Lab 01/11/24 0358 01/12/24  0422 01/13/24  0325   WBC 15.24* 15.59* 18.98*           Heme/Onc:  -- Daily CBC  -- on heparin gtt for A-fib  Recent Labs   Lab 01/11/24  0358 01/12/24  0422 01/12/24  0631 01/12/24  1316 01/12/24  2146 01/13/24  0325 01/13/24  0428   HGB 9.0* 8.9*  --   --   --  8.4*  --     312  --   --   --  308  --    APTT 39.0* 33.9* 62.5* 24.9 34.9*  --  47.2*   INR 1.2 1.2 1.2  --   --   --   --            Endo:   -- history of diabetes  -- Gluc goal 140-180  -- SSI      PPx:   Feeding:  NPO  Analgesia/Sedation:  Pain is controlled, no sedation  Thromboembolic prevention: heparin   HOB >30: yes  Stress Ulcer ppx: pantoprazole IV  Glucose control: Critical care goal 140-180 g/dl, SSI  Bowel reg:  having BM  Invasive Lines/Drains/Airway: Permacath, PIV x2, TVP groin line  Deescalation: none        Dispo/Code Status/Palliative:   -- SICU/ Full Code

## 2024-01-14 LAB
ALBUMIN SERPL BCP-MCNC: 2.2 G/DL (ref 3.5–5.2)
ALBUMIN SERPL BCP-MCNC: 2.3 G/DL (ref 3.5–5.2)
ALLENS TEST: ABNORMAL
ALP SERPL-CCNC: 182 U/L (ref 55–135)
ALT SERPL W/O P-5'-P-CCNC: 5 U/L (ref 10–44)
ANION GAP SERPL CALC-SCNC: 11 MMOL/L (ref 8–16)
ANION GAP SERPL CALC-SCNC: 12 MMOL/L (ref 8–16)
ANION GAP SERPL CALC-SCNC: 14 MMOL/L (ref 8–16)
ANION GAP SERPL CALC-SCNC: 14 MMOL/L (ref 8–16)
ANION GAP SERPL CALC-SCNC: 18 MMOL/L (ref 8–16)
ANION GAP SERPL CALC-SCNC: 19 MMOL/L (ref 8–16)
ANION GAP SERPL CALC-SCNC: 19 MMOL/L (ref 8–16)
ANISOCYTOSIS BLD QL SMEAR: SLIGHT
APTT PPP: 45.3 SEC (ref 21–32)
AST SERPL-CCNC: 21 U/L (ref 10–40)
BACTERIA BLD CULT: ABNORMAL
BASOPHILS # BLD AUTO: ABNORMAL K/UL (ref 0–0.2)
BASOPHILS NFR BLD: 1 % (ref 0–1.9)
BILIRUB SERPL-MCNC: 0.7 MG/DL (ref 0.1–1)
BUN SERPL-MCNC: 28 MG/DL (ref 8–23)
BUN SERPL-MCNC: 28 MG/DL (ref 8–23)
BUN SERPL-MCNC: 39 MG/DL (ref 8–23)
BUN SERPL-MCNC: 39 MG/DL (ref 8–23)
BUN SERPL-MCNC: 43 MG/DL (ref 8–23)
BUN SERPL-MCNC: 47 MG/DL (ref 8–23)
BUN SERPL-MCNC: 47 MG/DL (ref 8–23)
BURR CELLS BLD QL SMEAR: ABNORMAL
CALCIUM SERPL-MCNC: 8.4 MG/DL (ref 8.7–10.5)
CALCIUM SERPL-MCNC: 8.4 MG/DL (ref 8.7–10.5)
CALCIUM SERPL-MCNC: 8.7 MG/DL (ref 8.7–10.5)
CALCIUM SERPL-MCNC: 8.8 MG/DL (ref 8.7–10.5)
CALCIUM SERPL-MCNC: 8.8 MG/DL (ref 8.7–10.5)
CALCIUM SERPL-MCNC: 9 MG/DL (ref 8.7–10.5)
CALCIUM SERPL-MCNC: 9 MG/DL (ref 8.7–10.5)
CHLORIDE SERPL-SCNC: 103 MMOL/L (ref 95–110)
CHLORIDE SERPL-SCNC: 103 MMOL/L (ref 95–110)
CHLORIDE SERPL-SCNC: 106 MMOL/L (ref 95–110)
CHLORIDE SERPL-SCNC: 107 MMOL/L (ref 95–110)
CHLORIDE SERPL-SCNC: 108 MMOL/L (ref 95–110)
CO2 SERPL-SCNC: 17 MMOL/L (ref 23–29)
CO2 SERPL-SCNC: 21 MMOL/L (ref 23–29)
CO2 SERPL-SCNC: 21 MMOL/L (ref 23–29)
CO2 SERPL-SCNC: 22 MMOL/L (ref 23–29)
CO2 SERPL-SCNC: 22 MMOL/L (ref 23–29)
CREAT SERPL-MCNC: 2.3 MG/DL (ref 0.5–1.4)
CREAT SERPL-MCNC: 2.4 MG/DL (ref 0.5–1.4)
CREAT SERPL-MCNC: 3.2 MG/DL (ref 0.5–1.4)
CREAT SERPL-MCNC: 3.2 MG/DL (ref 0.5–1.4)
CREAT SERPL-MCNC: 3.3 MG/DL (ref 0.5–1.4)
CREAT SERPL-MCNC: 3.3 MG/DL (ref 0.5–1.4)
CREAT SERPL-MCNC: 3.5 MG/DL (ref 0.5–1.4)
DELSYS: ABNORMAL
DIFFERENTIAL METHOD BLD: ABNORMAL
DIGOXIN SERPL-MCNC: 0.8 NG/ML (ref 0.8–2)
EOSINOPHIL # BLD AUTO: ABNORMAL K/UL (ref 0–0.5)
EOSINOPHIL NFR BLD: 0 % (ref 0–8)
ERYTHROCYTE [DISTWIDTH] IN BLOOD BY AUTOMATED COUNT: 18.5 % (ref 11.5–14.5)
ERYTHROCYTE [SEDIMENTATION RATE] IN BLOOD BY WESTERGREN METHOD: 27 MM/H
EST. GFR  (NO RACE VARIABLE): 13.2 ML/MIN/1.73 M^2
EST. GFR  (NO RACE VARIABLE): 14.2 ML/MIN/1.73 M^2
EST. GFR  (NO RACE VARIABLE): 14.2 ML/MIN/1.73 M^2
EST. GFR  (NO RACE VARIABLE): 14.7 ML/MIN/1.73 M^2
EST. GFR  (NO RACE VARIABLE): 14.7 ML/MIN/1.73 M^2
EST. GFR  (NO RACE VARIABLE): 20.8 ML/MIN/1.73 M^2
EST. GFR  (NO RACE VARIABLE): 21.9 ML/MIN/1.73 M^2
FIO2: 24
FLOW: 1
GIANT PLATELETS BLD QL SMEAR: PRESENT
GLUCOSE SERPL-MCNC: 129 MG/DL (ref 70–110)
GLUCOSE SERPL-MCNC: 133 MG/DL (ref 70–110)
GLUCOSE SERPL-MCNC: 178 MG/DL (ref 70–110)
GLUCOSE SERPL-MCNC: 178 MG/DL (ref 70–110)
GLUCOSE SERPL-MCNC: 262 MG/DL (ref 70–110)
GLUCOSE SERPL-MCNC: 317 MG/DL (ref 70–110)
GLUCOSE SERPL-MCNC: 317 MG/DL (ref 70–110)
HCO3 UR-SCNC: 22 MMOL/L (ref 24–28)
HCT VFR BLD AUTO: 25 % (ref 37–48.5)
HGB BLD-MCNC: 8.8 G/DL (ref 12–16)
HYPOCHROMIA BLD QL SMEAR: ABNORMAL
IMM GRANULOCYTES # BLD AUTO: ABNORMAL K/UL (ref 0–0.04)
IMM GRANULOCYTES NFR BLD AUTO: ABNORMAL % (ref 0–0.5)
LYMPHOCYTES # BLD AUTO: ABNORMAL K/UL (ref 1–4.8)
LYMPHOCYTES NFR BLD: 6 % (ref 18–48)
MAGNESIUM SERPL-MCNC: 2.2 MG/DL (ref 1.6–2.6)
MAGNESIUM SERPL-MCNC: 2.3 MG/DL (ref 1.6–2.6)
MCH RBC QN AUTO: 28.4 PG (ref 27–31)
MCHC RBC AUTO-ENTMCNC: 35.2 G/DL (ref 32–36)
MCV RBC AUTO: 81 FL (ref 82–98)
METAMYELOCYTES NFR BLD MANUAL: 2 %
MODE: ABNORMAL
MONOCYTES # BLD AUTO: ABNORMAL K/UL (ref 0.3–1)
MONOCYTES NFR BLD: 5 % (ref 4–15)
MYELOCYTES NFR BLD MANUAL: 6 %
NEUTROPHILS NFR BLD: 77 % (ref 38–73)
NEUTS BAND NFR BLD MANUAL: 3 %
NRBC BLD-RTO: 0 /100 WBC
OVALOCYTES BLD QL SMEAR: ABNORMAL
PCO2 BLDA: 33.2 MMHG (ref 35–45)
PH SMN: 7.43 [PH] (ref 7.35–7.45)
PHOSPHATE SERPL-MCNC: 3 MG/DL (ref 2.7–4.5)
PHOSPHATE SERPL-MCNC: 3 MG/DL (ref 2.7–4.5)
PHOSPHATE SERPL-MCNC: 3.9 MG/DL (ref 2.7–4.5)
PHOSPHATE SERPL-MCNC: 3.9 MG/DL (ref 2.7–4.5)
PHOSPHATE SERPL-MCNC: 4.6 MG/DL (ref 2.7–4.5)
PHOSPHATE SERPL-MCNC: 4.6 MG/DL (ref 2.7–4.5)
PHOSPHATE SERPL-MCNC: 4.7 MG/DL (ref 2.7–4.5)
PLATELET # BLD AUTO: 338 K/UL (ref 150–450)
PLATELET BLD QL SMEAR: ABNORMAL
PMV BLD AUTO: 11.3 FL (ref 9.2–12.9)
PO2 BLDA: 66 MMHG (ref 80–100)
POC BE: -2 MMOL/L
POC SATURATED O2: 94 % (ref 95–100)
POC TCO2: 23 MMOL/L (ref 23–27)
POCT GLUCOSE: 181 MG/DL (ref 70–110)
POCT GLUCOSE: 197 MG/DL (ref 70–110)
POCT GLUCOSE: 323 MG/DL (ref 70–110)
POIKILOCYTOSIS BLD QL SMEAR: ABNORMAL
POLYCHROMASIA BLD QL SMEAR: ABNORMAL
POTASSIUM SERPL-SCNC: 4.4 MMOL/L (ref 3.5–5.1)
POTASSIUM SERPL-SCNC: 4.5 MMOL/L (ref 3.5–5.1)
POTASSIUM SERPL-SCNC: 4.6 MMOL/L (ref 3.5–5.1)
POTASSIUM SERPL-SCNC: 4.6 MMOL/L (ref 3.5–5.1)
POTASSIUM SERPL-SCNC: 5 MMOL/L (ref 3.5–5.1)
PROT SERPL-MCNC: 6.4 G/DL (ref 6–8.4)
RBC # BLD AUTO: 3.1 M/UL (ref 4–5.4)
SAMPLE: ABNORMAL
SCHISTOCYTES BLD QL SMEAR: ABNORMAL
SITE: ABNORMAL
SODIUM SERPL-SCNC: 138 MMOL/L (ref 136–145)
SODIUM SERPL-SCNC: 138 MMOL/L (ref 136–145)
SODIUM SERPL-SCNC: 139 MMOL/L (ref 136–145)
SODIUM SERPL-SCNC: 142 MMOL/L (ref 136–145)
SODIUM SERPL-SCNC: 142 MMOL/L (ref 136–145)
SODIUM SERPL-SCNC: 143 MMOL/L (ref 136–145)
SODIUM SERPL-SCNC: 143 MMOL/L (ref 136–145)
TARGETS BLD QL SMEAR: ABNORMAL
WBC # BLD AUTO: 25.47 K/UL (ref 3.9–12.7)

## 2024-01-14 PROCEDURE — 94668 MNPJ CHEST WALL SBSQ: CPT

## 2024-01-14 PROCEDURE — 11000001 HC ACUTE MED/SURG PRIVATE ROOM

## 2024-01-14 PROCEDURE — 83735 ASSAY OF MAGNESIUM: CPT | Mod: 91 | Performed by: STUDENT IN AN ORGANIZED HEALTH CARE EDUCATION/TRAINING PROGRAM

## 2024-01-14 PROCEDURE — 80069 RENAL FUNCTION PANEL: CPT | Mod: 91 | Performed by: STUDENT IN AN ORGANIZED HEALTH CARE EDUCATION/TRAINING PROGRAM

## 2024-01-14 PROCEDURE — 63600175 PHARM REV CODE 636 W HCPCS

## 2024-01-14 PROCEDURE — 25000003 PHARM REV CODE 250

## 2024-01-14 PROCEDURE — 37799 UNLISTED PX VASCULAR SURGERY: CPT

## 2024-01-14 PROCEDURE — 63600175 PHARM REV CODE 636 W HCPCS: Performed by: STUDENT IN AN ORGANIZED HEALTH CARE EDUCATION/TRAINING PROGRAM

## 2024-01-14 PROCEDURE — 90945 DIALYSIS ONE EVALUATION: CPT | Mod: GC,,, | Performed by: INTERNAL MEDICINE

## 2024-01-14 PROCEDURE — 25000242 PHARM REV CODE 250 ALT 637 W/ HCPCS

## 2024-01-14 PROCEDURE — 94640 AIRWAY INHALATION TREATMENT: CPT

## 2024-01-14 PROCEDURE — 85730 THROMBOPLASTIN TIME PARTIAL: CPT

## 2024-01-14 PROCEDURE — 25000003 PHARM REV CODE 250: Performed by: SURGERY

## 2024-01-14 PROCEDURE — 25000003 PHARM REV CODE 250: Performed by: STUDENT IN AN ORGANIZED HEALTH CARE EDUCATION/TRAINING PROGRAM

## 2024-01-14 PROCEDURE — 85027 COMPLETE CBC AUTOMATED: CPT | Performed by: SURGERY

## 2024-01-14 PROCEDURE — 99900035 HC TECH TIME PER 15 MIN (STAT)

## 2024-01-14 PROCEDURE — 94761 N-INVAS EAR/PLS OXIMETRY MLT: CPT

## 2024-01-14 PROCEDURE — 99291 CRITICAL CARE FIRST HOUR: CPT | Mod: ,,, | Performed by: ANESTHESIOLOGY

## 2024-01-14 PROCEDURE — 90945 DIALYSIS ONE EVALUATION: CPT

## 2024-01-14 PROCEDURE — 99233 SBSQ HOSP IP/OBS HIGH 50: CPT | Mod: ,,, | Performed by: INTERNAL MEDICINE

## 2024-01-14 PROCEDURE — 85007 BL SMEAR W/DIFF WBC COUNT: CPT | Performed by: SURGERY

## 2024-01-14 PROCEDURE — 80162 ASSAY OF DIGOXIN TOTAL: CPT

## 2024-01-14 PROCEDURE — 84100 ASSAY OF PHOSPHORUS: CPT | Performed by: SURGERY

## 2024-01-14 PROCEDURE — 82803 BLOOD GASES ANY COMBINATION: CPT

## 2024-01-14 PROCEDURE — 80053 COMPREHEN METABOLIC PANEL: CPT | Performed by: SURGERY

## 2024-01-14 PROCEDURE — 27000221 HC OXYGEN, UP TO 24 HOURS

## 2024-01-14 RX ORDER — ALBUTEROL SULFATE 2.5 MG/.5ML
2.5 SOLUTION RESPIRATORY (INHALATION) EVERY 4 HOURS PRN
Status: DISCONTINUED | OUTPATIENT
Start: 2024-01-14 | End: 2024-02-06 | Stop reason: HOSPADM

## 2024-01-14 RX ORDER — METOPROLOL TARTRATE 1 MG/ML
INJECTION, SOLUTION INTRAVENOUS
Status: COMPLETED
Start: 2024-01-14 | End: 2024-01-14

## 2024-01-14 RX ORDER — OXYBUTYNIN CHLORIDE 5 MG/1
5 TABLET ORAL 3 TIMES DAILY
Status: DISCONTINUED | OUTPATIENT
Start: 2024-01-14 | End: 2024-02-06 | Stop reason: HOSPADM

## 2024-01-14 RX ORDER — METOPROLOL TARTRATE 1 MG/ML
5 INJECTION, SOLUTION INTRAVENOUS EVERY 5 MIN PRN
Status: COMPLETED | OUTPATIENT
Start: 2024-01-14 | End: 2024-01-15

## 2024-01-14 RX ORDER — HYDROCODONE BITARTRATE AND ACETAMINOPHEN 500; 5 MG/1; MG/1
TABLET ORAL CONTINUOUS
Status: ACTIVE | OUTPATIENT
Start: 2024-01-14 | End: 2024-01-15

## 2024-01-14 RX ORDER — METOPROLOL TARTRATE 1 MG/ML
2.5 INJECTION, SOLUTION INTRAVENOUS ONCE
Status: COMPLETED | OUTPATIENT
Start: 2024-01-14 | End: 2024-01-14

## 2024-01-14 RX ORDER — DRONABINOL 2.5 MG/1
5 CAPSULE ORAL 2 TIMES DAILY
Status: DISCONTINUED | OUTPATIENT
Start: 2024-01-14 | End: 2024-02-06 | Stop reason: HOSPADM

## 2024-01-14 RX ORDER — MAGNESIUM SULFATE HEPTAHYDRATE 40 MG/ML
2 INJECTION, SOLUTION INTRAVENOUS
Status: ACTIVE | OUTPATIENT
Start: 2024-01-14 | End: 2024-01-15

## 2024-01-14 RX ADMIN — SEVELAMER CARBONATE 1600 MG: 800 TABLET, FILM COATED ORAL at 04:01

## 2024-01-14 RX ADMIN — OXYBUTYNIN CHLORIDE 5 MG: 5 TABLET ORAL at 02:01

## 2024-01-14 RX ADMIN — INSULIN ASPART 8 UNITS: 100 INJECTION, SOLUTION INTRAVENOUS; SUBCUTANEOUS at 09:01

## 2024-01-14 RX ADMIN — ALBUTEROL SULFATE 2.5 MG: 2.5 SOLUTION RESPIRATORY (INHALATION) at 07:01

## 2024-01-14 RX ADMIN — INSULIN ASPART 2 UNITS: 100 INJECTION, SOLUTION INTRAVENOUS; SUBCUTANEOUS at 10:01

## 2024-01-14 RX ADMIN — SODIUM CHLORIDE: 9 INJECTION, SOLUTION INTRAVENOUS at 03:01

## 2024-01-14 RX ADMIN — METOROPROLOL TARTRATE 5 MG: 5 INJECTION, SOLUTION INTRAVENOUS at 04:01

## 2024-01-14 RX ADMIN — METOCLOPRAMIDE HYDROCHLORIDE 5 MG: 10 INJECTION, SOLUTION INTRAMUSCULAR; INTRAVENOUS at 06:01

## 2024-01-14 RX ADMIN — LIDOCAINE 5% 1 PATCH: 700 PATCH TOPICAL at 08:01

## 2024-01-14 RX ADMIN — ACETYLCYSTEINE 4 ML: 100 INHALANT RESPIRATORY (INHALATION) at 01:01

## 2024-01-14 RX ADMIN — SODIUM CHLORIDE: 9 INJECTION, SOLUTION INTRAVENOUS at 08:01

## 2024-01-14 RX ADMIN — INSULIN ASPART 2 UNITS: 100 INJECTION, SOLUTION INTRAVENOUS; SUBCUTANEOUS at 08:01

## 2024-01-14 RX ADMIN — ACETYLCYSTEINE 4 ML: 100 INHALANT RESPIRATORY (INHALATION) at 08:01

## 2024-01-14 RX ADMIN — OXYBUTYNIN CHLORIDE 5 MG: 5 TABLET ORAL at 10:01

## 2024-01-14 RX ADMIN — METOCLOPRAMIDE HYDROCHLORIDE 5 MG: 10 INJECTION, SOLUTION INTRAMUSCULAR; INTRAVENOUS at 05:01

## 2024-01-14 RX ADMIN — SEVELAMER CARBONATE 1600 MG: 800 TABLET, FILM COATED ORAL at 11:01

## 2024-01-14 RX ADMIN — PANTOPRAZOLE SODIUM 40 MG: 40 TABLET, DELAYED RELEASE ORAL at 08:01

## 2024-01-14 RX ADMIN — METOROPROLOL TARTRATE 2.5 MG: 5 INJECTION, SOLUTION INTRAVENOUS at 02:01

## 2024-01-14 RX ADMIN — ALBUTEROL SULFATE 2.5 MG: 2.5 SOLUTION RESPIRATORY (INHALATION) at 08:01

## 2024-01-14 RX ADMIN — PIPERACILLIN SODIUM AND TAZOBACTAM SODIUM 4.5 G: 4; .5 INJECTION, POWDER, FOR SOLUTION INTRAVENOUS at 09:01

## 2024-01-14 RX ADMIN — METOCLOPRAMIDE HYDROCHLORIDE 5 MG: 10 INJECTION, SOLUTION INTRAMUSCULAR; INTRAVENOUS at 11:01

## 2024-01-14 RX ADMIN — DRONABINOL 5 MG: 2.5 CAPSULE ORAL at 09:01

## 2024-01-14 RX ADMIN — PIPERACILLIN SODIUM AND TAZOBACTAM SODIUM 4.5 G: 4; .5 INJECTION, POWDER, FOR SOLUTION INTRAVENOUS at 08:01

## 2024-01-14 RX ADMIN — METOROPROLOL TARTRATE 5 MG: 5 INJECTION, SOLUTION INTRAVENOUS at 03:01

## 2024-01-14 RX ADMIN — INSULIN ASPART 6 UNITS: 100 INJECTION, SOLUTION INTRAVENOUS; SUBCUTANEOUS at 05:01

## 2024-01-14 RX ADMIN — ALBUTEROL SULFATE 2.5 MG: 2.5 SOLUTION RESPIRATORY (INHALATION) at 01:01

## 2024-01-14 RX ADMIN — OXYBUTYNIN CHLORIDE 5 MG: 5 TABLET ORAL at 09:01

## 2024-01-14 RX ADMIN — ACETYLCYSTEINE 4 ML: 100 INHALANT RESPIRATORY (INHALATION) at 07:01

## 2024-01-14 RX ADMIN — SEVELAMER CARBONATE 1600 MG: 800 TABLET, FILM COATED ORAL at 08:01

## 2024-01-14 RX ADMIN — HEPARIN SODIUM 16 UNITS/KG/HR: 10000 INJECTION, SOLUTION INTRAVENOUS at 11:01

## 2024-01-14 NOTE — SUBJECTIVE & OBJECTIVE
Interval History: Patient seen and examined this morning. Remains hemodynamically stable. WBC bumped this morning. CT scan obtained demonstrating R pleural effusions, pericardial effusion. Tolerating CRRT, goal for net negative.     Medications:  Continuous Infusions:   sodium chloride 0.9% 200 mL/hr at 01/14/24 0700    sodium chloride 0.9%      heparin (porcine) in D5W 16 Units/kg/hr (01/14/24 0700)     Scheduled Meds:   acetylcysteine 100 mg/ml (10%)  4 mL Nebulization TID WAKE    albuterol sulfate  2.5 mg Nebulization TID WAKE    epoetin noble (PROCRIT) injection  4,000 Units Intravenous Every Mon, Wed, Fri    LIDOcaine  1 patch Transdermal Q24H    metoclopramide  5 mg Intravenous Q6H    pantoprazole  40 mg Oral Daily    piperacillin-tazobactam (Zosyn) IV (PEDS and ADULTS) (extended infusion is not appropriate)  4.5 g Intravenous Q12H    sevelamer carbonate  1,600 mg Oral TID WM     PRN Meds:sodium chloride 0.9%, bisacodyL, dextrose 10%, dextrose 10%, glucagon (human recombinant), glucose, glucose, heparin (porcine), heparin (PORCINE), heparin (PORCINE), hydrALAZINE, insulin aspart U-100, magnesium sulfate IVPB, magnesium sulfate IVPB, ondansetron, sodium chloride 0.9%, sodium phosphate 20.01 mmol in dextrose 5 % (D5W) 250 mL IVPB, sodium phosphate 30 mmol in dextrose 5 % (D5W) 250 mL IVPB, sodium phosphate 39.99 mmol in dextrose 5 % (D5W) 250 mL IVPB     Review of patient's allergies indicates:   Allergen Reactions    Crestor [rosuvastatin] Swelling    Gluten protein      Objective:     Vital Signs (Most Recent):  Temp: 96.6 °F (35.9 °C) (01/14/24 0300)  Pulse: 73 (01/14/24 0732)  Resp: (!) 23 (01/14/24 0732)  BP: (!) 154/97 (01/14/24 0700)  SpO2: 96 % (01/14/24 0732) Vital Signs (24h Range):  Temp:  [96.6 °F (35.9 °C)-98 °F (36.7 °C)] 96.6 °F (35.9 °C)  Pulse:  [] 73  Resp:  [13-37] 23  SpO2:  [88 %-100 %] 96 %  BP: (101-168)/(58-97) 154/97  Arterial Line BP: (103-172)/(52-76) 138/68     Intake/Output -  Last 3 Shifts         01/12 0700  01/13 0659 01/13 0700  01/14 0659 01/14 0700  01/15 0659    I.V. (mL/kg) 327.4 (4.8) 1936.2 (28.1) 219.7 (3.2)    Other       IV Piggyback 216.4 199.9     Total Intake(mL/kg) 543.8 (7.9) 2136.1 (31) 219.7 (3.2)    Urine (mL/kg/hr)  50 (0)     Other  2027     Total Output  2077     Net +543.8 +59.1 +219.7                   SpO2: 96 %        Physical Exam  Vitals and nursing note reviewed.   Constitutional:       Appearance: She is ill-appearing.   HENT:      Nose: Nose normal.   Cardiovascular:      Rate and Rhythm: Normal rate and regular rhythm.      Pulses: Normal pulses.   Pulmonary:      Effort: Pulmonary effort is normal. No respiratory distress.   Abdominal:      General: Abdomen is flat. There is no distension.      Palpations: Abdomen is soft.      Tenderness: There is no abdominal tenderness.      Comments: Soft, nontender abdomen   Genitourinary:     Comments: TVP wire in place  Skin:     General: Skin is warm and dry.   Neurological:      General: No focal deficit present.      Mental Status: She is alert.            Significant Labs:  CBC:   Recent Labs   Lab 01/14/24  0223   WBC 25.47*   RBC 3.10*   HGB 8.8*   HCT 25.0*      MCV 81*   MCH 28.4   MCHC 35.2       Significant Diagnostics:  I have reviewed and interpreted all pertinent imaging results/findings within the past 24 hours.    VTE Risk Mitigation (From admission, onward)           Ordered     heparin 25,000 units in dextrose 5% 250 mL (100 units/mL) infusion LOW INTENSITY nomogram - [facility]  Continuous        Question:  Begin at (units/kg/hr)  Answer:  12 01/12/24 1358     heparin 25,000 units in dextrose 5% (100 units/ml) IV bolus from bag - ADDITIONAL PRN BOLUS - 60 units/kg  As needed (PRN)        Question:  Heparin Infusion Adjustment (DO NOT MODIFY ANSWER)  Answer:  [email or web address]\epic\Images\Pharmacy\HeparinInfusions\heparin LOW INTENSITY nomogram for [facility] [id number].pdf    01/12/24 1358     heparin 25,000  units in dextrose 5% (100 units/ml) IV bolus from bag - ADDITIONAL PRN BOLUS - 30 units/kg  As needed (PRN)        Question:  Heparin Infusion Adjustment (DO NOT MODIFY ANSWER)  Answer:  \\[email or web address]\epic\Images\Pharmacy\HeparinInfusions\heparin LOW INTENSITY nomogram for [facility] [id number].pdf    01/12/24 1358     heparin (porcine) injection 1,000 Units  As needed (PRN)         01/11/24 1351     heparin (porcine) injection 1,000 Units  As needed (PRN)         01/03/24 1123     IP VTE HIGH RISK PATIENT  Once         01/02/24 1226     Place CARMELITA hose  Until discontinued         01/02/24 1226     Place sequential compression device  Until discontinued         01/02/24 1226

## 2024-01-14 NOTE — ASSESSMENT & PLAN NOTE
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 Stepped up to the unit for persistent afib rvr. Had SBO (now resolved). 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       Neuro/Psych:   -- Sedation: none  -- Pain: Lidocaine patch   -- has some baseline anxiety, no longer on meds     Cards:   -- BP goals SBP >90, MAPs >55   - Off phenylephrine  -- Afib RVR   - Amio transitioned to PO, now held post cardiac arrest  -- Asystolic arrest   - morning of 1/12/24 patient went into asystole. Had multiple bradycardic events overnight which had self resolved. At time of asystole, nurse performed 1 min of chest compressions and achieved ROSC.EP notified and presented for STAT TVP placement. Access unable to be obtained due to central venous stenosis. 2nd episode of asystole. Resolved after minimal compressions. 3rd episode of asystole resolved with singular compression. Patient brought to Cath lab for TVP placed via groin.   - TVP placed @ back up rate of 40   - tentative plan for PPM on 1/16, heparin gtt to be held at midnight before   - digoxin level high --> administered digibind 1/12, transitioned to normal sinus after administration   - dig level this morning is within normal limits     Pulm:   -- Goal O2 sat > 90%  -- on 1L satting upper 90's.  -- Chest tube d/c'd 1/8/24  -- CXR with pleural effusions and RLL opacification  -- CT chest (1/13): R worse than L pleural effusion, pericardial effusion   -- Thoracic surgery recommending possible IR procedure for pleural effusions later in week  -- continue pulmonary toilet with acetylcysteine and prn albuterol nebulizer        Renal:  -- history of ESRD, dialysis dependent   - CRRT tonight, needs more volume removed goal net negative to better optimize patients cardiopulmonary status. Has to lay flat with TVP and currently dealing with multiple  other stressors on her cardiopulmonary function including bilateral pleural effusions, pericardial effusion, and tachy-eulogio arrhythmias.  -- trend BUN/Cr  -- continue sevelamer TIDWM  -- patient is oliguric  -- permacath in place last exchanged 10/2023  -- fistula unable to be used  -- start oxybutynin in effort to reduce possible bladder spasm triggering eulogio episodes    Recent Labs   Lab 01/13/24  0325 01/13/24  2107 01/14/24  0223   BUN 47* 58* 28*  28*   CREATININE 4.6* 5.2* 2.3*  2.4*           FEN / GI:   -- CT on 1/8/24 demonstrating SBO, now resolved  -- US on 1/8/24 showing distended gallbladder without signs of acute cholecystitis   - NGT removed.  - Equivocal GGC 1/10  - Scheduled reglan for gastric motility  -- PPI  -- Replace lytes as needed  -- Nutrition: diabetic/renal/gluten free soft and bite sized diet with novasource daily  - We will work to advance diet today, needs assistance with eating/drinking as she is restricted to laying flat up to 30 degree head elevation.  Aspiration precautions are of utmost importance. Dietician consult placed for complex dietary restriction  - if still inadequate PO intake, consider restarting home appetite stimulant      ID:   -- Tm: afebrile; WBC 25  -- C/f HAP with RLL consolidation  -- MRSA neg, dc vanc  -- Cont zosyn for 7 day course  -- blood cultures with 1 bottle growing staph epi, likely a contaminant.  -- possible IR procedure on pleural effusions later this week, if so would extend abx coverage 4 days after source control achieved and f/u cultures from procedure    Recent Labs   Lab 01/12/24  0422 01/13/24  0325 01/14/24  0223   WBC 15.59* 18.98* 25.47*           Heme/Onc:  -- Daily CBC  -- on heparin gtt for A-fib, hold midnight before PPM 1/16  Recent Labs   Lab 01/11/24  0358 01/12/24  0422 01/12/24  0631 01/12/24  1316 01/13/24  0325 01/13/24  0428 01/13/24  1026 01/14/24  0223   HGB 9.0* 8.9*  --   --  8.4*  --   --  8.8*    312  --   --   308  --   --  338   APTT 39.0* 33.9* 62.5*   < >  --  47.2* 44.7* 45.3*   INR 1.2 1.2 1.2  --   --   --   --   --     < > = values in this interval not displayed.           Endo:   -- history of diabetes  -- Gluc goal 140-180  -- SSI      PPx:   Feeding: diabetic/renal/gluten diet and novasource  Analgesia/Sedation:  Pain is controlled, no sedation  Thromboembolic prevention: heparin gtt  HOB >30: yes  Stress Ulcer ppx: pantoprazole  Glucose control: Critical care goal 140-180 g/dl, SSI  Bowel reg:  having BM  Invasive Lines/Drains/Airway: Permacath, PIV x2, TVP groin line  Deescalation: none        Dispo/Code Status/Palliative:   -- SICU/ Full Code

## 2024-01-14 NOTE — PROGRESS NOTES
01/14/24 1020   Treatment   Treatment Type SCUF   Treatment Status Blood returned;Discontinued treatment   Dialysis Machine Number [id number]   Dialyzer Time (hours) 11.23   BVP (Liters) 47.8 L   CRRT Comments Rinsed back without issue per primary RN.

## 2024-01-14 NOTE — SUBJECTIVE & OBJECTIVE
Interval History:  Patient had immediate response to digit fib yesterday.  Within 5 minutes of administration, she converted out of atrial fibrillation and was initially bradycardic requiring transvenous pacing, but within a few more minutes was sinus with 1:1 AV conduction and not TTP dependent.    Review of Systems   Constitutional: Negative for diaphoresis and fever.   Cardiovascular:  Negative for chest pain, dyspnea on exertion, leg swelling, near-syncope, orthopnea, palpitations, paroxysmal nocturnal dyspnea and syncope.   Respiratory:  Negative for cough and shortness of breath.    Gastrointestinal:  Negative for abdominal pain, diarrhea, nausea and vomiting.   Neurological:  Negative for light-headedness.   Psychiatric/Behavioral:  Negative for altered mental status and substance abuse.      Objective:     Vital Signs (Most Recent):  Temp: 97.5 °F (36.4 °C) (01/13/24 2300)  Pulse: 80 (01/13/24 2300)  Resp: (!) 23 (01/13/24 2300)  BP: (!) 168/70 (01/13/24 2300)  SpO2: 100 % (01/13/24 2300) Vital Signs (24h Range):  Temp:  [97.5 °F (36.4 °C)-98 °F (36.7 °C)] 97.5 °F (36.4 °C)  Pulse:  [] 80  Resp:  [16-37] 23  SpO2:  [88 %-100 %] 100 %  BP: ()/(55-78) 168/70  Arterial Line BP: (106-168)/(48-75) 127/61     Weight: 68.9 kg (152 lb)  Body mass index is 25.29 kg/m².     SpO2: 100 %        Physical Exam  Constitutional:       Appearance: Normal appearance.   Cardiovascular:      Rate and Rhythm: Normal rate and regular rhythm.      Pulses: Normal pulses.      Heart sounds: Normal heart sounds.      Comments: TVP in place  Pulmonary:      Effort: Pulmonary effort is normal.      Breath sounds: Normal breath sounds. No wheezing or rales.   Abdominal:      General: Abdomen is flat. There is no distension.      Palpations: Abdomen is soft.      Tenderness: There is no abdominal tenderness.   Musculoskeletal:      Right lower leg: No edema.      Left lower leg: No edema.   Skin:     General: Skin is warm and  dry.   Neurological:      Mental Status: She is alert and oriented to person, place, and time. Mental status is at baseline.   Psychiatric:         Mood and Affect: Mood normal.         Behavior: Behavior normal.            Significant Labs: All pertinent lab results from the last 24 hours have been reviewed.    Significant Imaging:  Reviewed

## 2024-01-14 NOTE — ASSESSMENT & PLAN NOTE
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 Stepped up to the ICU 1/5 with afib RVR refractory to IV metoprolol, started on amio gtt with conversion.     - patient seen and examined this morning   - remains hemodynamically stable   - TVP back up pacing at 40; intrinsic rate of 80-110s   - tolerating CRRT-- goal net negative   - CT scan demonstrates R pleural effusion, pericardial effusion  - tolerating clear liquid diet  - Nephrology following, appreciate assistance.   - daily CMP, CBC   - IR consultation for pleural effusion drainage  - Appreciate SICU assistance

## 2024-01-14 NOTE — PROGRESS NOTES
[facility]  Cardiac Electrophysiology  Progress Note    Admission Date: 1/2/2024  Code Status: Full Code   Attending Physician: Tan Thomson MD   Expected Discharge Date: 1/19/2024  Principal Problem:Malignant carcinoid tumor of bronchus    Subjective:     Interval History:  Patient had immediate response to digit fib yesterday.  Within 5 minutes of administration, she converted out of atrial fibrillation and was initially bradycardic requiring transvenous pacing, but within a few more minutes was sinus with 1:1 AV conduction and not TTP dependent.    Review of Systems   Constitutional: Negative for diaphoresis and fever.   Cardiovascular:  Negative for chest pain, dyspnea on exertion, leg swelling, near-syncope, orthopnea, palpitations, paroxysmal nocturnal dyspnea and syncope.   Respiratory:  Negative for cough and shortness of breath.    Gastrointestinal:  Negative for abdominal pain, diarrhea, nausea and vomiting.   Neurological:  Negative for light-headedness.   Psychiatric/Behavioral:  Negative for altered mental status and substance abuse.      Objective:     Vital Signs (Most Recent):  Temp: 97.5 °F (36.4 °C) (01/13/24 2300)  Pulse: 80 (01/13/24 2300)  Resp: (!) 23 (01/13/24 2300)  BP: (!) 168/70 (01/13/24 2300)  SpO2: 100 % (01/13/24 2300) Vital Signs (24h Range):  Temp:  [97.5 °F (36.4 °C)-98 °F (36.7 °C)] 97.5 °F (36.4 °C)  Pulse:  [] 80  Resp:  [16-37] 23  SpO2:  [88 %-100 %] 100 %  BP: ()/(55-78) 168/70  Arterial Line BP: (106-168)/(48-75) 127/61     Weight: 68.9 kg (152 lb)  Body mass index is 25.29 kg/m².     SpO2: 100 %        Physical Exam  Constitutional:       Appearance: Normal appearance.   Cardiovascular:      Rate and Rhythm: Normal rate and regular rhythm.      Pulses: Normal pulses.      Heart sounds: Normal heart sounds.      Comments: TVP in place  Pulmonary:      Effort: Pulmonary effort is normal.      Breath sounds: Normal breath sounds. No  wheezing or rales.   Abdominal:      General: Abdomen is flat. There is no distension.      Palpations: Abdomen is soft.      Tenderness: There is no abdominal tenderness.   Musculoskeletal:      Right lower leg: No edema.      Left lower leg: No edema.   Skin:     General: Skin is warm and dry.   Neurological:      Mental Status: She is alert and oriented to person, place, and time. Mental status is at baseline.   Psychiatric:         Mood and Affect: Mood normal.         Behavior: Behavior normal.            Significant Labs: All pertinent lab results from the last 24 hours have been reviewed.    Significant Imaging:  Reviewed  Assessment and Plan:     Atrial fibrillation  #Bradycardia    73yoF with ESRD admitted for bronchial tumor removal with post op course complicated by atrial fibrillation. She had difficult to control rates but further post-operatively, her rates improved so was weaned off rate controlling agents. Overnight 1/11-1/12 and morning of 1/12 she had several bradycardic episodes in the setting of atrial fibrillation, recent digoxin use (reportedly renally dosed, but some high levels with ESRD).    In the setting of asystolic events with several short rounds of compressions (no more than 1 round at a time), TVP was attempted. 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     Digifab with successful reversal. Continuing to monitor with TVP in place over the weekend    Recommendations:  - TVP backup rate dropped to 40 to evaluate underlying rhythm if bradycardic  - Avoid digoxin  - We will follow over the weekend and will place PPM if needed early next week  - Okay to use AV pérez blocking agents if needed for RVR, but would stay with short acting IV BB pushes so we can continue to monitor her underlying AV pérez activity  - Continue anticoagulation        Connor M Gillies,  MD  Cardiac Electrophysiology  [facility]

## 2024-01-14 NOTE — PROGRESS NOTES
[facility]  Nephrology  Progress Note    Patient Name: Lily Green  MRN: [id number]  Admission Date: 1/2/2024  Hospital Length of Stay: 12 days  Attending Provider: Tan Thomson MD   Primary Care Physician: Pavel Calixto MD  Principal Problem:Malignant carcinoid tumor of bronchus    Subjective:     HPI: Patient is a 73 y.o. 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 S/p thoracotomy. Last HD prior to presentation 1/1/24. Nephrology consulted for ESRD on HD.        Interval History: several episodes of eulogio, however did not require intervention. Unfortunately UF goal of 350 not achieved until 0400. Net positive 200 cc in last 24 hours    Review of patient's allergies indicates:   Allergen Reactions    Crestor [rosuvastatin] Swelling    Gluten protein      Current Facility-Administered Medications   Medication Frequency    0.9%  NaCl infusion (CRRT USE ONLY) Continuous    0.9%  NaCl infusion PRN    acetylcysteine 100 mg/ml (10%) solution 4 mL TID WAKE    albuterol sulfate nebulizer solution 2.5 mg TID WAKE    bisacodyL suppository 10 mg Daily PRN    dextrose 10% bolus 125 mL 125 mL PRN    dextrose 10% bolus 250 mL 250 mL PRN    epoetin noble injection 4,000 Units Every Mon, Wed, Fri    glucagon (human recombinant) injection 1 mg PRN    glucose chewable tablet 16 g PRN    glucose chewable tablet 24 g PRN    heparin (porcine) injection 1,000 Units PRN    heparin 25,000 units in dextrose 5% (100 units/ml) IV bolus from bag - ADDITIONAL PRN BOLUS - 30 units/kg PRN    heparin 25,000 units in dextrose 5% (100 units/ml) IV bolus from bag - ADDITIONAL PRN BOLUS - 60 units/kg PRN    heparin 25,000 units in dextrose 5% 250 mL (100 units/mL) infusion LOW INTENSITY nomogram - [facility] Continuous    hydrALAZINE injection 10 mg Q6H PRN    insulin aspart U-100 pen 0-10 Units  Q4H PRN    LIDOcaine 5 % patch 1 patch Q24H    magnesium sulfate 2g in water 50mL IVPB (premix) PRN    metoclopramide injection 5 mg Q6H    ondansetron injection 8 mg Q8H PRN    pantoprazole EC tablet 40 mg Daily    piperacillin-tazobactam (ZOSYN) 4.5 g in dextrose 5 % in water (D5W) 100 mL IVPB (MB+) Q12H    sevelamer carbonate tablet 1,600 mg TID WM    sodium chloride 0.9% flush 10 mL PRN    sodium phosphate 20.01 mmol in dextrose 5 % (D5W) 250 mL IVPB PRN    sodium phosphate 30 mmol in dextrose 5 % (D5W) 250 mL IVPB PRN    sodium phosphate 39.99 mmol in dextrose 5 % (D5W) 250 mL IVPB PRN       Objective:     Vital Signs (Most Recent):  Temp: 96.6 °F (35.9 °C) (01/14/24 0300)  Pulse: 69 (01/14/24 0700)  Resp: 13 (01/14/24 0700)  BP: (!) 154/97 (01/14/24 0700)  SpO2: 97 % (01/14/24 0500) Vital Signs (24h Range):  Temp:  [96.6 °F (35.9 °C)-98 °F (36.7 °C)] 96.6 °F (35.9 °C)  Pulse:  [] 69  Resp:  [13-37] 13  SpO2:  [88 %-100 %] 97 %  BP: (101-168)/(58-97) 154/97  Arterial Line BP: (103-172)/(52-76) 138/68     Weight: 68.9 kg (152 lb) (01/12/24 0900)  Body mass index is 25.29 kg/m².  Body surface area is 1.78 meters squared.    I/O last 3 completed shifts:  In: 2721.7 [I.V.:2403.8; IV Piggyback:317.9]  Out: 2077 [Urine:50; Other:2027]     Physical Exam  Constitutional:       General: She is not in acute distress.     Appearance: She is ill-appearing. She is not toxic-appearing.   HENT:      Head: Normocephalic and atraumatic.   Cardiovascular:      Heart sounds: Murmur heard.      Comments: Right permcath  Musculoskeletal:      Right lower leg: Edema present.      Left lower leg: Edema present.   Neurological:      Mental Status: She is alert.          Significant Labs:  All labs within the past 24 hours have been reviewed.     Significant Imaging:  Labs: Reviewed  Assessment/Plan:     Cardiac/Vascular  Atrial fibrillation  -management per primary     Renal/  ESRD (end stage renal disease)  S/p R thoracotomy  due to RML carcinoid tumor. On HD ~ 1 year. Last HD prior to presentation 1/1/24. Pt has NELY AVF placed on 10/23 that has not been cleared by vascular. Currently using RIJ TDC.     During her admission: stepped up to ICU for Afib with RVR. Was started on BB, amio and digoxin. However 1/12/24, had multiple episodes of eulogio with few asystole requiring chest compressions. Digoxin level elevated and dig-meaghan was given with resulting decrease in levels.     Nephrology History  iHD Schedule: TTS   Unit/MD: [facility] Hinds/ Dr. Vyas  Duration: 3.5 hours   EDW: 58 kg   Access: RIJ TDC  Residual Renal Function: minimal       Volume status: net positive this admission (records indicate 5 liters)    Assessment:   -Nocturnal SLED/SCUF 8 hours and 4 hours respectively with net UF goal of 150 cc/hr.  -Keep MAP > 65  -Keep hemoglobin > 7  -Strict ins and outs  -Avoid nephrotoxic agents if possible and renally dose medications  -Avoid drastic hemodynamic changes if possible      Endocrine  Type 2 diabetes mellitus with diabetic polyneuropathy, without long-term current use of insulin  -management per primary         Thank you for your consult. I will follow-up with patient. Please contact us if you have any additional questions.    Johnson Hrenandez MD  Nephrology  [facility]

## 2024-01-14 NOTE — SUBJECTIVE & OBJECTIVE
Interval History:  Still paroxysmal atrial fibrillation. She has bradycardic episodes (no evidence of conversion pauses) during which her R-R space out, she maintains her underlying atrial rhythm, and begins to pace. These are intermittent. Capture threshold <1mA.    Review of Systems   Constitutional: Negative for diaphoresis and fever.   Cardiovascular:  Negative for chest pain, dyspnea on exertion, leg swelling, near-syncope, orthopnea, palpitations, paroxysmal nocturnal dyspnea and syncope.   Respiratory:  Negative for cough and shortness of breath.    Gastrointestinal:  Negative for abdominal pain, diarrhea, nausea and vomiting.   Neurological:  Negative for light-headedness.   Psychiatric/Behavioral:  Negative for altered mental status and substance abuse.      Objective:     Vital Signs (Most Recent):  Temp: (!) 95.7 °F (35.4 °C) (01/14/24 1100)  Pulse: (!) 122 (01/14/24 1100)  Resp: (!) 21 (01/14/24 1100)  BP: 106/71 (01/14/24 1000)  SpO2: 96 % (01/14/24 0732) Vital Signs (24h Range):  Temp:  [95 °F (35 °C)-98 °F (36.7 °C)] 95.7 °F (35.4 °C)  Pulse:  [] 122  Resp:  [13-37] 21  SpO2:  [88 %-100 %] 96 %  BP: ()/(58-97) 106/71  Arterial Line BP: ()/(52-76) 117/73     Weight: 68.9 kg (152 lb)  Body mass index is 25.29 kg/m².     SpO2: 96 %        Physical Exam  Constitutional:       Appearance: Normal appearance.   Cardiovascular:      Rate and Rhythm: Normal rate and regular rhythm.      Pulses: Normal pulses.      Heart sounds: Normal heart sounds.      Comments: TVP in place  Pulmonary:      Effort: Pulmonary effort is normal.      Breath sounds: Normal breath sounds. No wheezing or rales.   Abdominal:      General: Abdomen is flat. There is no distension.      Palpations: Abdomen is soft.      Tenderness: There is no abdominal tenderness.   Musculoskeletal:      Right lower leg: No edema.      Left lower leg: No edema.   Skin:     General: Skin is warm and dry.   Neurological:      Mental  Status: She is alert and oriented to person, place, and time. Mental status is at baseline.   Psychiatric:         Mood and Affect: Mood normal.         Behavior: Behavior normal.            Significant Labs: All pertinent lab results from the last 24 hours have been reviewed.    Significant Imaging:  Reviewed

## 2024-01-14 NOTE — ASSESSMENT & PLAN NOTE
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 On HD ~ 1 year. Last HD prior to presentation 1/1/24. Pt has NELY AVF placed on 10/23 that has not been cleared by vascular. Currently using RIJ TDC.     During her admission: stepped up to ICU for Afib with RVR. Was started on BB, amio and digoxin. However 1/12/24, had multiple episodes of uelogio with few asystole requiring chest compressions. Digoxin level elevated and dig-meaghan was given with resulting decrease in levels.     Nephrology History  iHD Schedule: TTS   Unit/MD: Timbo Hinds/ Dr. Vyas  Duration: 3.5 hours   EDW: 58 kg   Access: RIJ TDC  Residual Renal Function: minimal       Volume status: net positive this admission (records indicate 5 liters)    Assessment:   -Nocturnal SLED/SCUF 6 hours and 6 hours respectively with net UF goal of 150 cc/hr.  -Keep MAP > 65  -Keep hemoglobin > 7  -Strict ins and outs  -Avoid nephrotoxic agents if possible and renally dose medications  -Avoid drastic hemodynamic changes if possible

## 2024-01-14 NOTE — PROGRESS NOTES
01/14/24 0416   Treatment   Treatment Type SCUF   Treatment Status Order change   Dialysis Machine Number [id number]   Dialyzer Time (hours) 5.27   BVP (Liters) 47.8 L   Solutions Labeled and Current  Yes   Access Tunneled Cath;Right;IJ   Catheter Dressing Intact  Yes   Alarms Engaged Yes   CRRT Comments SLED switched to SCUF     6 hour SLED completed. Tx switched over to SCUF for 6 hours per MD orders. Report given to primary nurse.

## 2024-01-14 NOTE — PLAN OF CARE
Problem: Adult Inpatient Plan of Care  Goal: Plan of Care Review  Outcome: Ongoing, Progressing  Goal: Patient-Specific Goal (Individualized)  Outcome: Ongoing, Progressing  Goal: Absence of Hospital-Acquired Illness or Injury  Outcome: Ongoing, Progressing  Goal: Optimal Comfort and Wellbeing  Outcome: Ongoing, Progressing  Goal: Readiness for Transition of Care  Outcome: Ongoing, Progressing     Problem: Diabetes Comorbidity  Goal: Blood Glucose Level Within Targeted Range  Outcome: Ongoing, Progressing     Problem: Infection  Goal: Absence of Infection Signs and Symptoms  Outcome: Ongoing, Progressing     Problem: Device-Related Complication Risk (Hemodialysis)  Goal: Safe, Effective Therapy Delivery  Outcome: Ongoing, Progressing     Problem: Hemodynamic Instability (Hemodialysis)  Goal: Effective Tissue Perfusion  Outcome: Ongoing, Progressing     Problem: Infection (Hemodialysis)  Goal: Absence of Infection Signs and Symptoms  Outcome: Ongoing, Progressing     Problem: Fall Injury Risk  Goal: Absence of Fall and Fall-Related Injury  Outcome: Ongoing, Progressing     Problem: Skin Injury Risk Increased  Goal: Skin Health and Integrity  Outcome: Ongoing, Progressing     Problem: Device-Related Complication Risk (CRRT (Continuous Renal Replacement Therapy))  Goal: Safe, Effective Therapy Delivery  Outcome: Ongoing, Progressing     Problem: Hypothermia (CRRT (Continuous Renal Replacement Therapy))  Goal: Body Temperature Maintained in Desired Range  Outcome: Ongoing, Progressing     Problem: Infection (CRRT (Continuous Renal Replacement Therapy))  Goal: Absence of Infection Signs and Symptoms  Outcome: Ongoing, Progressing

## 2024-01-14 NOTE — PROGRESS NOTES
[facility] - [facility] Intensive Care  Critical Care - Surgery  Progress Note    Patient Name: Lily Green  MRN: [id number]  Admission Date: 1/2/2024  Hospital Length of Stay: 12 days  Code Status: Full Code  Attending Provider: Tan Thomson MD  Primary Care Provider: Paevl Calixto MD   Principal Problem: Malignant carcinoid tumor of bronchus    Subjective:     Hospital/ICU Course:  No notes on file    Interval History/Significant Events: POD 12. Continued to have multiple bradycardic episodes yesterday and overnight requiring pacing, backup pacer @ 40. CT scan obtained overnight demonstrating pleural effusions with right loculated and worse than left, pericardial effusion. Tolerated clears well yesterday.     Follow-up For: Procedure(s) (LRB):  Pacemaker, Temporary, Transvenous (Right)    Post-Operative Day: 2 Days Post-Op    Objective:     Vital Signs (Most Recent):  Temp: (!) 95 °F (35 °C) (01/14/24 0700)  Pulse: (!) 142 (01/14/24 0924)  Resp: (!) 33 (01/14/24 0915)  BP: 90/63 (01/14/24 0900)  SpO2: 96 % (01/14/24 0732) Vital Signs (24h Range):  Temp:  [95 °F (35 °C)-98 °F (36.7 °C)] 95 °F (35 °C)  Pulse:  [] 142  Resp:  [13-37] 33  SpO2:  [88 %-100 %] 96 %  BP: ()/(58-97) 90/63  Arterial Line BP: ()/(52-76) 90/62     Weight: 68.9 kg (152 lb)  Body mass index is 25.29 kg/m².      Intake/Output Summary (Last 24 hours) at 1/14/2024 0958  Last data filed at 1/14/2024 0900  Gross per 24 hour   Intake 2743.04 ml   Output 3145 ml   Net -401.96 ml          Physical Exam  Vitals and nursing note reviewed.   Constitutional:       General: She is not in acute distress.     Appearance: Normal appearance. She is normal weight. She is ill-appearing. She is not toxic-appearing.   HENT:      Head: Normocephalic and atraumatic.      Right Ear: External ear normal.      Left Ear: External ear normal.      Nose: Nose normal.      Comments: Nasal cannula in place  Eyes:      General: No scleral  icterus.        Right eye: No discharge.         Left eye: No discharge.      Extraocular Movements: Extraocular movements intact.      Conjunctiva/sclera: Conjunctivae normal.   Cardiovascular:      Rate and Rhythm: Tachycardia present. Rhythm irregular.   Pulmonary:      Effort: Pulmonary effort is normal. No respiratory distress.   Abdominal:      General: Abdomen is flat. There is no distension.      Palpations: Abdomen is soft.      Tenderness: There is no abdominal tenderness.   Genitourinary:     Comments: TVP wire in place  Musculoskeletal:         General: Normal range of motion.      Cervical back: Normal range of motion and neck supple.   Skin:     General: Skin is warm and dry.      Coloration: Skin is pale.   Neurological:      General: No focal deficit present.      Mental Status: She is alert and oriented to person, place, and time. Mental status is at baseline.   Psychiatric:         Mood and Affect: Mood normal.         Behavior: Behavior normal.         Thought Content: Thought content normal.            Vents:  Oxygen Concentration (%): 24 (01/14/24 0732)    Lines/Drains/Airways       Central Venous Catheter Line  Duration                  Hemodialysis Catheter 01/25/23 1501 right internal jugular 353 days    Introducer 01/12/24 1100 Femoral Vein Right;Femoral Right 1 day              Drain  Duration             Female External Urinary Catheter w/ Suction 01/13/24 0701 1 day              Arterial Line  Duration             Arterial Line 01/08/24 1324 Right Radial 5 days              Line  Duration                  Pacer Wires 01/12/24 1032 1 day              Peripheral Intravenous Line  Duration                  Hemodialysis AV Fistula 09/01/23 0800 Left forearm 135 days         Peripheral IV - Single Lumen 01/08/24 1108 22 G Posterior;Right Wrist 5 days         Peripheral IV - Single Lumen 01/08/24 1357 20 G Posterior;Right Forearm 5 days         Peripheral IV - Single Lumen 01/12/24 1415 20 G;1  3/4 in Right Forearm 1 day                    Significant Labs:    CBC/Anemia Profile:  Recent Labs   Lab 01/13/24 0325 01/14/24 0223   WBC 18.98* 25.47*   HGB 8.4* 8.8*   HCT 22.5* 25.0*    338   MCV 77* 81*   RDW 18.0* 18.5*        Chemistries:  Recent Labs   Lab 01/13/24  0325 01/13/24 2107 01/14/24 0223    139 142  139   K 4.4 4.3 4.5  4.4    100 108  106   CO2 24 21* 22*  22*   BUN 47* 58* 28*  28*   CREATININE 4.6* 5.2* 2.3*  2.4*   CALCIUM 8.8 9.0 9.0  9.0   ALBUMIN 2.1* 2.1* 2.3*  2.3*   PROT 5.9*  --  6.4   BILITOT 0.7  --  0.7   ALKPHOS 186*  --  182*   ALT 9*  --  5*   AST 21  --  21   MG 2.6 2.5 2.2   PHOS 5.6* 6.0* 3.0  3.0       All pertinent labs within the past 24 hours have been reviewed.    Significant Imaging:  I have reviewed all pertinent imaging results/findings within the past 24 hours.  Assessment/Plan:     Cardiac/Vascular  Atrial fibrillation  74 yo female with 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 Stepped up to the unit for persistent afib rvr. Had SBO (now resolved). 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       Neuro/Psych:   -- Sedation: none  -- Pain: Lidocaine patch   -- has some baseline anxiety, no longer on meds     Cards:   -- BP goals SBP >90, MAPs >55   - Off phenylephrine  -- Afib RVR   - Amio transitioned to PO, now held post cardiac arrest  -- Asystolic arrest   - morning of 1/12/24 patient went into asystole. Had multiple bradycardic events overnight which had self resolved. At time of asystole, nurse performed 1 min of chest compressions and achieved ROSC.EP notified and presented for STAT TVP placement. Access unable to be obtained due to central venous stenosis. 2nd episode of asystole. Resolved after minimal compressions. 3rd episode of asystole resolved with singular compression. Patient brought to Cath lab  for TVP placed via groin.   - TVP placed @ back up rate of 40   - tentative plan for PPM on 1/16, heparin gtt to be held at midnight before   - digoxin level high --> administered digibind 1/12, transitioned to normal sinus after administration   - dig level this morning is within normal limits     Pulm:   -- Goal O2 sat > 90%  -- on 1L satting upper 90's.  -- Chest tube d/c'd 1/8/24  -- CXR with pleural effusions and RLL opacification  -- CT chest (1/13): R worse than L pleural effusion, pericardial effusion   -- Thoracic surgery recommending possible IR procedure for pleural effusions later in week  -- continue pulmonary toilet with acetylcysteine and prn albuterol nebulizer        Renal:  -- history of ESRD, dialysis dependent   - CRRT tonight, needs more volume removed goal net negative to better optimize patients cardiopulmonary status. Has to lay flat with TVP and currently dealing with multiple other stressors on her cardiopulmonary function including bilateral pleural effusions, pericardial effusion, and tachy-eulogio arrhythmias.  -- trend BUN/Cr  -- continue sevelamer TIDWM  -- patient is oliguric  -- permacath in place last exchanged 10/2023  -- fistula unable to be used  -- start oxybutynin in effort to reduce possible bladder spasm triggering eulogio episodes    Recent Labs   Lab 01/13/24  0325 01/13/24  2107 01/14/24  0223   BUN 47* 58* 28*  28*   CREATININE 4.6* 5.2* 2.3*  2.4*           FEN / GI:   -- CT on 1/8/24 demonstrating SBO, now resolved  -- US on 1/8/24 showing distended gallbladder without signs of acute cholecystitis   - NGT removed.  - Equivocal GGC 1/10  - Scheduled reglan for gastric motility  -- PPI  -- Replace lytes as needed  -- Nutrition: diabetic/renal/gluten free soft and bite sized diet with novasource daily  - We will work to advance diet today, needs assistance with eating/drinking as she is restricted to laying flat up to 30 degree head elevation.  Aspiration precautions are of  utmost importance. Dietician consult placed for complex dietary restriction  - if still inadequate PO intake, consider restarting home appetite stimulant      ID:   -- Tm: afebrile; WBC 25  -- C/f HAP with RLL consolidation  -- MRSA neg, dc vanc  -- Cont zosyn for 7 day course  -- blood cultures with 1 bottle growing staph epi, likely a contaminant.  -- possible IR procedure on pleural effusions later this week, if so would extend abx coverage 4 days after source control achieved and f/u cultures from procedure    Recent Labs   Lab 01/12/24  0422 01/13/24  0325 01/14/24  0223   WBC 15.59* 18.98* 25.47*           Heme/Onc:  -- Daily CBC  -- on heparin gtt for A-fib, hold midnight before PPM 1/16  Recent Labs   Lab 01/11/24  0358 01/12/24  0422 01/12/24  0631 01/12/24  1316 01/13/24  0325 01/13/24  0428 01/13/24  1026 01/14/24  0223   HGB 9.0* 8.9*  --   --  8.4*  --   --  8.8*    312  --   --  308  --   --  338   APTT 39.0* 33.9* 62.5*   < >  --  47.2* 44.7* 45.3*   INR 1.2 1.2 1.2  --   --   --   --   --     < > = values in this interval not displayed.           Endo:   -- history of diabetes  -- Gluc goal 140-180  -- SSI      PPx:   Feeding: diabetic/renal/gluten diet and novasource  Analgesia/Sedation:  Pain is controlled, no sedation  Thromboembolic prevention: heparin gtt  HOB >30: yes  Stress Ulcer ppx: pantoprazole  Glucose control: Critical care goal 140-180 g/dl, SSI  Bowel reg:  having BM  Invasive Lines/Drains/Airway: Permacath, PIV x2, TVP groin line  Deescalation: none        Dispo/Code Status/Palliative:   -- SICU/ Full Code         Gissel Jack MD  Critical Care - Surgery  [facility] - Surgical Intensive Care

## 2024-01-14 NOTE — CARE UPDATE
01/13/24 1918   Patient Assessment/Suction   Level of Consciousness (AVPU) alert   Respiratory Effort Normal;Unlabored   Expansion/Accessory Muscles/Retractions expansion symmetric;no retractions;no use of accessory muscles   Skin Integrity   $ Wound Care Tech Time 15 min   Area Observed Left;Right;Cheek;Nares   Skin Appearance without discoloration   PRE-TX-O2   Device (Oxygen Therapy) nasal cannula   $ Is the patient on Low Flow Oxygen? Yes   Flow (L/min) 2   Oxygen Concentration (%) 28   SpO2 97 %   Pulse Oximetry Type Continuous   $ Pulse Oximetry - Multiple Charge Pulse Oximetry - Multiple   Ready to Wean/Extubation Screen   FIO2<=50 (chart decimal) 0.28     Patient transported to CT at this time.

## 2024-01-14 NOTE — ASSESSMENT & PLAN NOTE
#Bradycardia    73yoF with ESRD admitted for bronchial tumor removal with post op course complicated by atrial fibrillation. She had difficult to control rates but further post-operatively, her rates improved so was weaned off rate controlling agents. Overnight 1/11-1/12 and morning of 1/12 she had several bradycardic episodes in the setting of atrial fibrillation, recent digoxin use (reportedly renally dosed, but some high levels with ESRD).    In the setting of asystolic events with several short rounds of compressions (no more than 1 round at a time), TVP was attempted. 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     Digifab with successful reversal. Continuing to monitor with TVP in place over the weekend    Recommendations:  - TVP backup rate 40  - Avoid digoxin  - Will continue to follow. Tentative PPM early this week. Will consider Micra given her venous stenosis hx  - Okay to use AV pérez blocking agents if needed for RVR, but would stay with short acting IV BB pushes so we can continue to monitor her underlying AV pérez activity  - Continue anticoagulation

## 2024-01-14 NOTE — SUBJECTIVE & OBJECTIVE
Interval History/Significant Events: POD 12. Continued to have multiple bradycardic episodes yesterday and overnight requiring pacing, backup pacer @ 40. CT scan obtained overnight demonstrating pleural effusions with right loculated and worse than left, pericardial effusion. Tolerated clears well yesterday.     Follow-up For: Procedure(s) (LRB):  Pacemaker, Temporary, Transvenous (Right)    Post-Operative Day: 2 Days Post-Op    Objective:     Vital Signs (Most Recent):  Temp: (!) 95 °F (35 °C) (01/14/24 0700)  Pulse: (!) 142 (01/14/24 0924)  Resp: (!) 33 (01/14/24 0915)  BP: 90/63 (01/14/24 0900)  SpO2: 96 % (01/14/24 0732) Vital Signs (24h Range):  Temp:  [95 °F (35 °C)-98 °F (36.7 °C)] 95 °F (35 °C)  Pulse:  [] 142  Resp:  [13-37] 33  SpO2:  [88 %-100 %] 96 %  BP: ()/(58-97) 90/63  Arterial Line BP: ()/(52-76) 90/62     Weight: 68.9 kg (152 lb)  Body mass index is 25.29 kg/m².      Intake/Output Summary (Last 24 hours) at 1/14/2024 0958  Last data filed at 1/14/2024 0900  Gross per 24 hour   Intake 2743.04 ml   Output 3145 ml   Net -401.96 ml          Physical Exam  Vitals and nursing note reviewed.   Constitutional:       General: She is not in acute distress.     Appearance: Normal appearance. She is normal weight. She is ill-appearing. She is not toxic-appearing.   HENT:      Head: Normocephalic and atraumatic.      Right Ear: External ear normal.      Left Ear: External ear normal.      Nose: Nose normal.      Comments: Nasal cannula in place  Eyes:      General: No scleral icterus.        Right eye: No discharge.         Left eye: No discharge.      Extraocular Movements: Extraocular movements intact.      Conjunctiva/sclera: Conjunctivae normal.   Cardiovascular:      Rate and Rhythm: Tachycardia present. Rhythm irregular.   Pulmonary:      Effort: Pulmonary effort is normal. No respiratory distress.   Abdominal:      General: Abdomen is flat. There is no distension.      Palpations:  Abdomen is soft.      Tenderness: There is no abdominal tenderness.   Genitourinary:     Comments: TVP wire in place  Musculoskeletal:         General: Normal range of motion.      Cervical back: Normal range of motion and neck supple.   Skin:     General: Skin is warm and dry.      Coloration: Skin is pale.   Neurological:      General: No focal deficit present.      Mental Status: She is alert and oriented to person, place, and time. Mental status is at baseline.   Psychiatric:         Mood and Affect: Mood normal.         Behavior: Behavior normal.         Thought Content: Thought content normal.            Vents:  Oxygen Concentration (%): 24 (01/14/24 0732)    Lines/Drains/Airways       Central Venous Catheter Line  Duration                  Hemodialysis Catheter 01/25/23 1501 right internal jugular 353 days    Introducer 01/12/24 1100 Femoral Vein Right;Femoral Right 1 day              Drain  Duration             Female External Urinary Catheter w/ Suction 01/13/24 0701 1 day              Arterial Line  Duration             Arterial Line 01/08/24 1324 Right Radial 5 days              Line  Duration                  Pacer Wires 01/12/24 1032 1 day              Peripheral Intravenous Line  Duration                  Hemodialysis AV Fistula 09/01/23 0800 Left forearm 135 days         Peripheral IV - Single Lumen 01/08/24 1108 22 G Posterior;Right Wrist 5 days         Peripheral IV - Single Lumen 01/08/24 1357 20 G Posterior;Right Forearm 5 days         Peripheral IV - Single Lumen 01/12/24 1415 20 G;1 3/4 in Right Forearm 1 day                    Significant Labs:    CBC/Anemia Profile:  Recent Labs   Lab 01/13/24  0325 01/14/24  0223   WBC 18.98* 25.47*   HGB 8.4* 8.8*   HCT 22.5* 25.0*    338   MCV 77* 81*   RDW 18.0* 18.5*        Chemistries:  Recent Labs   Lab 01/13/24  0325 01/13/24  2107 01/14/24  0223    139 142  139   K 4.4 4.3 4.5  4.4    100 108  106   CO2 24 21* 22*  22*   BUN 47*  58* 28*  28*   CREATININE 4.6* 5.2* 2.3*  2.4*   CALCIUM 8.8 9.0 9.0  9.0   ALBUMIN 2.1* 2.1* 2.3*  2.3*   PROT 5.9*  --  6.4   BILITOT 0.7  --  0.7   ALKPHOS 186*  --  182*   ALT 9*  --  5*   AST 21  --  21   MG 2.6 2.5 2.2   PHOS 5.6* 6.0* 3.0  3.0       All pertinent labs within the past 24 hours have been reviewed.    Significant Imaging:  I have reviewed all pertinent imaging results/findings within the past 24 hours.

## 2024-01-14 NOTE — CARE UPDATE
Care Update:     Patient in room 62229 CVICU/76372 CVICU A. Blood glucose stable on current inpatient regimen.No hypoglycemia noted over the past 24-hours. Endocrine will continue to follow and manage insulin orders inpatient.  2 Days Post-Op    Steroid use- None.   Renal function:   Lab Results   Component Value Date    CREATININE 2.4 (H) 01/14/2024    CREATININE 2.3 (H) 01/14/2024        Diet diabetic Soft & Bite Sized (IDDSI Level 6), Gluten Free, Renal; 2000 Calorie     POCT Glucose   Date Value Ref Range Status   01/14/2024 181 (H) 70 - 110 mg/dL Final   01/13/2024 173 (H) 70 - 110 mg/dL Final   01/13/2024 189 (H) 70 - 110 mg/dL Final   01/13/2024 209 (H) 70 - 110 mg/dL Final   01/13/2024 154 (H) 70 - 110 mg/dL Final   01/13/2024 193 (H) 70 - 110 mg/dL Final   01/13/2024 218 (H) 70 - 110 mg/dL Final   01/12/2024 167 (H) 70 - 110 mg/dL Final   01/12/2024 170 (H) 70 - 110 mg/dL Final   01/12/2024 185 (H) 70 - 110 mg/dL Final   01/12/2024 139 (H) 70 - 110 mg/dL Final   01/11/2024 177 (H) 70 - 110 mg/dL Final   01/11/2024 130 (H) 70 - 110 mg/dL Final   01/11/2024 160 (H) 70 - 110 mg/dL Final     Lab Results   Component Value Date    HGBA1C 4.9 11/15/2023         Home Medications   Diabetes Medications               insulin aspart U-100 (NOVOLOG) 100 unit/mL (3 mL) InPn pen Inject before meals:  150-200=+1, 201-250=+2, 251-300=+3; 301-350=+4, over 350=+5 units    neomycin (MYCIFRADIN) 500 mg Tab           Endocrine  Type 2 diabetes mellitus with diabetic polyneuropathy, without long-term current use of insulin  BG goal: 140-180     - Novolog Mod dose correction with ISF 25 starting at 150 prn ac/hs   - POCT Glucose every 4 hours  - Hypoglycemia protocol in place      ** Please notify Endocrine for any change and/or advance in diet**  ** Please call Endocrine for any BG related issues **     Discharge Planning:   TBD. Please notify endocrinology prior to discharge.    Pranav Perry DNP, FNP-C  Department of  Endocrinology  Inpatient Glycemic Management

## 2024-01-14 NOTE — PLAN OF CARE
Recommendations    1. Rec'd Renal, gluten free diet with texture per SLP      2. Rec'd ONS Novasource renal BID      3. RD to monitor and follow    Goals: Meet % EEN, EPN by RD f/u  Nutrition Goal Status: new  Communication of RD Recs:  (POC)

## 2024-01-14 NOTE — ASSESSMENT & PLAN NOTE
#Bradycardia    73yoF with ESRD admitted for bronchial tumor removal with post op course complicated by atrial fibrillation. She had difficult to control rates but further post-operatively, her rates improved so was weaned off rate controlling agents. Overnight 1/11-1/12 and morning of 1/12 she had several bradycardic episodes in the setting of atrial fibrillation, recent digoxin use (reportedly renally dosed, but some high levels with ESRD).    In the setting of asystolic events with several short rounds of compressions (no more than 1 round at a time), TVP was attempted. 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     Digifab with successful reversal. Continuing to monitor with TVP in place over the weekend    Recommendations:  - TVP backup rate dropped to 40 to evaluate underlying rhythm if bradycardic  - Avoid digoxin  - We will follow over the weekend and will place PPM if needed early next week  - Okay to use AV pérez blocking agents if needed for RVR, but would stay with short acting IV BB pushes so we can continue to monitor her underlying AV pérez activity  - Continue anticoagulation

## 2024-01-14 NOTE — PROGRESS NOTES
[facility]  Thoracic Surgery  Progress Note    Subjective:     History of Present Illness:  No notes on file    Post-Op Info:  Procedure(s) (LRB):  Pacemaker, Temporary, Transvenous (Right)   2 Days Post-Op     Interval History: Patient seen and examined this morning. Remains hemodynamically stable. WBC bumped this morning. CT scan obtained demonstrating R pleural effusions, pericardial effusion. Tolerating CRRT, goal for net negative.     Medications:  Continuous Infusions:   sodium chloride 0.9% 200 mL/hr at 01/14/24 0700    sodium chloride 0.9%      heparin (porcine) in D5W 16 Units/kg/hr (01/14/24 0700)     Scheduled Meds:   acetylcysteine 100 mg/ml (10%)  4 mL Nebulization TID WAKE    albuterol sulfate  2.5 mg Nebulization TID WAKE    epoetin noble (PROCRIT) injection  4,000 Units Intravenous Every Mon, Wed, Fri    LIDOcaine  1 patch Transdermal Q24H    metoclopramide  5 mg Intravenous Q6H    pantoprazole  40 mg Oral Daily    piperacillin-tazobactam (Zosyn) IV (PEDS and ADULTS) (extended infusion is not appropriate)  4.5 g Intravenous Q12H    sevelamer carbonate  1,600 mg Oral TID WM     PRN Meds:sodium chloride 0.9%, bisacodyL, dextrose 10%, dextrose 10%, glucagon (human recombinant), glucose, glucose, heparin (porcine), heparin (PORCINE), heparin (PORCINE), hydrALAZINE, insulin aspart U-100, magnesium sulfate IVPB, magnesium sulfate IVPB, ondansetron, sodium chloride 0.9%, sodium phosphate 20.01 mmol in dextrose 5 % (D5W) 250 mL IVPB, sodium phosphate 30 mmol in dextrose 5 % (D5W) 250 mL IVPB, sodium phosphate 39.99 mmol in dextrose 5 % (D5W) 250 mL IVPB     Review of patient's allergies indicates:   Allergen Reactions    Crestor [rosuvastatin] Swelling    Gluten protein      Objective:     Vital Signs (Most Recent):  Temp: 96.6 °F (35.9 °C) (01/14/24 0300)  Pulse: 73 (01/14/24 0732)  Resp: (!) 23 (01/14/24 0732)  BP: (!) 154/97 (01/14/24 0700)  SpO2: 96 % (01/14/24 0732) Vital Signs  (24h Range):  Temp:  [96.6 °F (35.9 °C)-98 °F (36.7 °C)] 96.6 °F (35.9 °C)  Pulse:  [] 73  Resp:  [13-37] 23  SpO2:  [88 %-100 %] 96 %  BP: (101-168)/(58-97) 154/97  Arterial Line BP: (103-172)/(52-76) 138/68     Intake/Output - Last 3 Shifts         01/12 0700  01/13 0659 01/13 0700 01/14 0659 01/14 0700  01/15 0659    I.V. (mL/kg) 327.4 (4.8) 1936.2 (28.1) 219.7 (3.2)    Other       IV Piggyback 216.4 199.9     Total Intake(mL/kg) 543.8 (7.9) 2136.1 (31) 219.7 (3.2)    Urine (mL/kg/hr)  50 (0)     Other  2027     Total Output  2077     Net +543.8 +59.1 +219.7                   SpO2: 96 %        Physical Exam  Vitals and nursing note reviewed.   Constitutional:       Appearance: She is ill-appearing.   HENT:      Nose: Nose normal.   Cardiovascular:      Rate and Rhythm: Normal rate and regular rhythm.      Pulses: Normal pulses.   Pulmonary:      Effort: Pulmonary effort is normal. No respiratory distress.   Abdominal:      General: Abdomen is flat. There is no distension.      Palpations: Abdomen is soft.      Tenderness: There is no abdominal tenderness.      Comments: Soft, nontender abdomen   Genitourinary:     Comments: TVP wire in place  Skin:     General: Skin is warm and dry.   Neurological:      General: No focal deficit present.      Mental Status: She is alert.            Significant Labs:  CBC:   Recent Labs   Lab 01/14/24  0223   WBC 25.47*   RBC 3.10*   HGB 8.8*   HCT 25.0*      MCV 81*   MCH 28.4   MCHC 35.2       Significant Diagnostics:  I have reviewed and interpreted all pertinent imaging results/findings within the past 24 hours.    VTE Risk Mitigation (From admission, onward)           Ordered     heparin 25,000 units in dextrose 5% 250 mL (100 units/mL) infusion LOW INTENSITY nomogram - [facility]  Continuous        Question:  Begin at (units/kg/hr)  Answer:  12 01/12/24 6736     heparin 25,000 units in dextrose 5% (100 units/ml) IV bolus from bag - ADDITIONAL PRN BOLUS - 60  units/kg  As needed (PRN)        Question:  Heparin Infusion Adjustment (DO NOT MODIFY ANSWER)  Answer:  \\[email or web address]\epic\Images\Pharmacy\HeparinInfusions\heparin LOW INTENSITY nomogram for [facility] [id number].pdf    01/12/24 1358     heparin 25,000 units in dextrose 5% (100 units/ml) IV bolus from bag - ADDITIONAL PRN BOLUS - 30 units/kg  As needed (PRN)        Question:  Heparin Infusion Adjustment (DO NOT MODIFY ANSWER)  Answer:  \\[facility] [email or web address]\epic\Images\Pharmacy\HeparinInfusions\heparin LOW INTENSITY nomogram for [facility] [id number].pdf    01/12/24 1358     heparin (porcine) injection 1,000 Units  As needed (PRN)         01/11/24 1351     heparin (porcine) injection 1,000 Units  As needed (PRN)         01/03/24 1123     IP VTE HIGH RISK PATIENT  Once         01/02/24 1226     Place CARMELITA hose  Until discontinued         01/02/24 1226     Place sequential compression device  Until discontinued         01/02/24 1226                  Assessment/Plan:     * Malignant carcinoid tumor of bronchus  74 yo female with ESRD (TTS), DDD, benign essential tremor, meniere's disease, HTN, HLD, DM2, GERD, Celiac's, IBS and 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 Stepped up to the ICU 1/5 with afib RVR refractory to IV metoprolol, started on amio gtt with conversion.     - patient seen and examined this morning   - remains hemodynamically stable   - TVP back up pacing at 40; intrinsic rate of 80-110s   - tolerating CRRT-- goal net negative   - CT scan demonstrates R pleural effusion, pericardial effusion  - tolerating clear liquid diet  - Nephrology following, appreciate assistance.   - daily CMP, CBC   - consider IR consultation for pleural effusion drainage  - Appreciate SICU assistance         Pranav Vega MD  Thoracic Surgery  [facility]

## 2024-01-14 NOTE — NURSING
Pt experiencing several episodes of bradycardia. Transvenous pacemaker attached and sensing but seems to take several seconds before pt is paced and returns to baseline. Team notified. EP notified. No concern noted and no further plans at the moment

## 2024-01-14 NOTE — CONSULTS
[facility]  Adult Nutrition  Consult Note    SUMMARY     Recommendations    1. Rec'd Renal, gluten free diet with texture per SLP      2. Rec'd ONS Novasource renal BID      3. RD to monitor and follow    Goals: Meet % EEN, EPN by RD f/u  Nutrition Goal Status: new  Communication of RD Recs:  (POC)    Nutrition Problem  Inadequate oral intake     Related to (etiology):   Decreased ability to consume sufficient energy     Signs and Symptoms (as evidenced by):   Restrictive diet       Interventions/Recommendations (treatment strategy):  Collaboration of nutrition care with other providers     Nutrition Diagnosis Status:   Continue    Reason for Assessment    Reason For Assessment: consult  Diagnosis:  (malignant carcinoid tumor of bronchus)  Relevant Medical History: T2DM, ESRD  Interdisciplinary Rounds: did not attend    General Information Comments:   RD consulted for diet needs. Pt with hx of ESRD on HD. Has diet restriction of renal, diabetic and gluten free. Usually only eat chicken, fish, cabbage, cauliflower at home with occasional smoothie. Was getting Novasource at [facility]. Family at bedside reports pt with minimal solid food intake.  lv per chart.  lb likely 2/2 fluid, 2+ edema per chart. NFPE completed today, pt with swollen extremities, unable to determine muscle status. Pt is at risk of malnutrition if PO intake remains low.     Nutrition Discharge Planning: Renal diet    Nutrition Risk Screen    Nutrition Risk Screen: no indicators present    Nutrition/Diet History    Patient Reported Diet/Restrictions/Preferences: gluten-free, diabetic diet, renal (Low potassium)  Food Preferences: No potatoes, grits, high potassium foods. Drink smoothies, eats strawberries, low sodium chicken broth, cabbage, carrots OK; Broiled fish/chicken  Spiritual, Cultural Beliefs, Rastafari Practices, Values that Affect Care: no  Food Allergies: wheat (gluten)  Factors Affecting  "Nutritional Intake: decreased appetite    Anthropometrics    Temp: (!) 95.7 °F (35.4 °C)  Height Method: Stated  Height: 5' 5" (165.1 cm)  Height (inches): 65 in  Weight Method: Bed Scale  Weight: 68.9 kg (152 lb)  Weight (lb): 152 lb  Ideal Body Weight (IBW), Female: 125 lb  % Ideal Body Weight, Female (lb): 121.6 %  BMI (Calculated): 25.3       Lab/Procedures/Meds    Pertinent Labs Reviewed: reviewed  Pertinent Labs Comments: glucose 178, BUN 39, Cr 3.2, albumin 2.3, Phos 4.6, eGFR 14.7  Pertinent Medications Reviewed: reviewed  Pertinent Medications Comments: pantoprazole, sevelamer carbonate    Physical Findings/Assessment         Estimated/Assessed Needs    Weight Used For Calorie Calculations: 68.5 kg (151 lb)  Energy Calorie Requirements (kcal): 0567-1163 kcal/d (25-35 kcal/kg)  Energy Need Method: Kcal/kg  Protein Requirements: 69-82 g/d (1.0-1.2 g/kg)  Weight Used For Protein Calculations: 68.5 kg (151 lb)        RDA Method (mL): 1712  CHO Requirement: 214-299 g      Nutrition Prescription Ordered    Current Diet Order: Diabetic, gluten free, renal Level 6  Nutrition Order Comments: 1500 ml FR  Oral Nutrition Supplement: Novasource    Evaluation of Received Nutrient/Fluid Intake    Energy Calories Required: not meeting needs  Protein Required: not meeting needs  Comments: LBM 1/12  Tolerance: not tolerating  % Intake of Estimated Energy Needs: 0 - 25 %  % Meal Intake: 0 - 25 %    Nutrition Risk    Level of Risk/Frequency of Follow-up:  (1x/week)       Monitor and Evaluation    Food and Nutrient Intake: energy intake, food and beverage intake  Food and Nutrient Adminstration: diet order  Knowledge/Beliefs/Attitudes: food and nutrition knowledge/skill, beliefs and attitudes  Physical Activity and Function: nutrition-related ADLs and IADLs  Anthropometric Measurements: height/length, weight, body mass index, weight change  Biochemical Data, Medical Tests and Procedures: electrolyte and renal panel, " gastrointestinal profile, glucose/endocrine profile, inflammatory profile, lipid profile  Nutrition-Focused Physical Findings: overall appearance       Nutrition Follow-Up    RD Follow-up?: Yes

## 2024-01-14 NOTE — PROGRESS NOTES
[facility]  Cardiac Electrophysiology  Progress Note    Admission Date: 1/2/2024  Code Status: Full Code   Attending Physician: Tan Thomson MD   Expected Discharge Date: 1/19/2024  Principal Problem:Malignant carcinoid tumor of bronchus    Subjective:     Interval History:  Still paroxysmal atrial fibrillation. She has bradycardic episodes (no evidence of conversion pauses) during which her R-R space out, she maintains her underlying atrial rhythm, and begins to pace. These are intermittent. Capture threshold <1mA.    Review of Systems   Constitutional: Negative for diaphoresis and fever.   Cardiovascular:  Negative for chest pain, dyspnea on exertion, leg swelling, near-syncope, orthopnea, palpitations, paroxysmal nocturnal dyspnea and syncope.   Respiratory:  Negative for cough and shortness of breath.    Gastrointestinal:  Negative for abdominal pain, diarrhea, nausea and vomiting.   Neurological:  Negative for light-headedness.   Psychiatric/Behavioral:  Negative for altered mental status and substance abuse.      Objective:     Vital Signs (Most Recent):  Temp: (!) 95.7 °F (35.4 °C) (01/14/24 1100)  Pulse: (!) 122 (01/14/24 1100)  Resp: (!) 21 (01/14/24 1100)  BP: 106/71 (01/14/24 1000)  SpO2: 96 % (01/14/24 0732) Vital Signs (24h Range):  Temp:  [95 °F (35 °C)-98 °F (36.7 °C)] 95.7 °F (35.4 °C)  Pulse:  [] 122  Resp:  [13-37] 21  SpO2:  [88 %-100 %] 96 %  BP: ()/(58-97) 106/71  Arterial Line BP: ()/(52-76) 117/73     Weight: 68.9 kg (152 lb)  Body mass index is 25.29 kg/m².     SpO2: 96 %        Physical Exam  Constitutional:       Appearance: Normal appearance.   Cardiovascular:      Rate and Rhythm: Normal rate and regular rhythm.      Pulses: Normal pulses.      Heart sounds: Normal heart sounds.      Comments: TVP in place  Pulmonary:      Effort: Pulmonary effort is normal.      Breath sounds: Normal breath sounds. No wheezing or rales.   Abdominal:       General: Abdomen is flat. There is no distension.      Palpations: Abdomen is soft.      Tenderness: There is no abdominal tenderness.   Musculoskeletal:      Right lower leg: No edema.      Left lower leg: No edema.   Skin:     General: Skin is warm and dry.   Neurological:      Mental Status: She is alert and oriented to person, place, and time. Mental status is at baseline.   Psychiatric:         Mood and Affect: Mood normal.         Behavior: Behavior normal.            Significant Labs: All pertinent lab results from the last 24 hours have been reviewed.    Significant Imaging:  Reviewed  Assessment and Plan:     Atrial fibrillation  #Bradycardia    73yoF with ESRD admitted for bronchial tumor removal with post op course complicated by atrial fibrillation. She had difficult to control rates but further post-operatively, her rates improved so was weaned off rate controlling agents. Overnight 1/11-1/12 and morning of 1/12 she had several bradycardic episodes in the setting of atrial fibrillation, recent digoxin use (reportedly renally dosed, but some high levels with ESRD).    In the setting of asystolic events with several short rounds of compressions (no more than 1 round at a time), TVP was attempted. 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     Digifab with successful reversal. Continuing to monitor with TVP in place over the weekend    Recommendations:  - TVP backup rate 40  - Avoid digoxin  - Will continue to follow. Tentative PPM early this week. Will consider Micra given her venous stenosis hx  - Okay to use AV pérez blocking agents if needed for RVR, but would stay with short acting IV BB pushes so we can continue to monitor her underlying AV pérez activity  - Continue anticoagulation        Connor M Gillies, MD  Cardiac Electrophysiology  [facility]

## 2024-01-14 NOTE — PLAN OF CARE
Please merge this note with today's resident critical care note.    SICU Staff Addendum  I have reviewed and concur with the resident's/NP's history, physical, assessment, and plan.  I have personally interviewed and examined the patient at bedside.  See below for any additional findings/changes.    Reason for admission:  Malignant carcinoid tumor of bronchus  Present on Admission:   ESRD (end stage renal disease)   Type 2 diabetes mellitus with diabetic polyneuropathy, without long-term current use of insulin   Malignant carcinoid tumor of bronchus   Anuria   A-V fistula      Active issues/Goals for Today:     74 y/o F now POD 12 s/p RML wedge resection for RML carcinoid tumor. Post-op course complicated by AF with RVR requiring ICU admission on 1/5 and SBO. 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     - Pain is controlled on current regimen.   - Tachy-eulogio syndrome and asystolic arrest x3 s/p s/p emergent TVP placement on 1/12. Dig toxicity could be contributing. She received digifab. EP recs noted and appreciated.  - Wean supplemental oxygen as able.  - CT chest reviewed and discussed with Dr. Herrera. Possible IR intervention sometime next week.  - SBO has resolved. Advancing to a renal diet.  - ESRD. Intermittent HD/CRRT as per nephrology. Would strongly suggest more aggressive fluid removal today.  - Heparin drip in the setting of AF.  - PT/OT  - OOB.  - Dispo : SICU for now due to TVP.    36 minutes of critical care time was spent personally by me on the following activities: development of treatment plan with patient or surrogate and bedside caregivers, discussions with consultants, evaluation of patient's response to treatment, examination of patient, ordering and performing treatments and interventions, ordering and review of laboratory studies, ordering and review of radiographic studies, pulse oximetry, re-evaluation of patient's condition.  This  critical care time did not overlap with that of any other provider or involve time for any procedures.    Wayne Landon MD  Anesthesiology/Critical Care

## 2024-01-14 NOTE — SUBJECTIVE & OBJECTIVE
Interval History: several episodes of eulogio, however did not require intervention. Unfortunately UF goal of 350 not achieved until 0400. Net positive 200 cc in last 24 hours    Review of patient's allergies indicates:   Allergen Reactions    Crestor [rosuvastatin] Swelling    Gluten protein      Current Facility-Administered Medications   Medication Frequency    0.9%  NaCl infusion (CRRT USE ONLY) Continuous    0.9%  NaCl infusion PRN    acetylcysteine 100 mg/ml (10%) solution 4 mL TID WAKE    albuterol sulfate nebulizer solution 2.5 mg TID WAKE    bisacodyL suppository 10 mg Daily PRN    dextrose 10% bolus 125 mL 125 mL PRN    dextrose 10% bolus 250 mL 250 mL PRN    epoetin noble injection 4,000 Units Every Mon, Wed, Fri    glucagon (human recombinant) injection 1 mg PRN    glucose chewable tablet 16 g PRN    glucose chewable tablet 24 g PRN    heparin (porcine) injection 1,000 Units PRN    heparin 25,000 units in dextrose 5% (100 units/ml) IV bolus from bag - ADDITIONAL PRN BOLUS - 30 units/kg PRN    heparin 25,000 units in dextrose 5% (100 units/ml) IV bolus from bag - ADDITIONAL PRN BOLUS - 60 units/kg PRN    heparin 25,000 units in dextrose 5% 250 mL (100 units/mL) infusion LOW INTENSITY nomogram - [facility] Continuous    hydrALAZINE injection 10 mg Q6H PRN    insulin aspart U-100 pen 0-10 Units Q4H PRN    LIDOcaine 5 % patch 1 patch Q24H    magnesium sulfate 2g in water 50mL IVPB (premix) PRN    metoclopramide injection 5 mg Q6H    ondansetron injection 8 mg Q8H PRN    pantoprazole EC tablet 40 mg Daily    piperacillin-tazobactam (ZOSYN) 4.5 g in dextrose 5 % in water (D5W) 100 mL IVPB (MB+) Q12H    sevelamer carbonate tablet 1,600 mg TID WM    sodium chloride 0.9% flush 10 mL PRN    sodium phosphate 20.01 mmol in dextrose 5 % (D5W) 250 mL IVPB PRN    sodium phosphate 30 mmol in dextrose 5 % (D5W) 250 mL IVPB PRN    sodium phosphate 39.99 mmol in dextrose 5 % (D5W) 250 mL IVPB PRN       Objective:     Vital Signs  (Most Recent):  Temp: 96.6 °F (35.9 °C) (01/14/24 0300)  Pulse: 69 (01/14/24 0700)  Resp: 13 (01/14/24 0700)  BP: (!) 154/97 (01/14/24 0700)  SpO2: 97 % (01/14/24 0500) Vital Signs (24h Range):  Temp:  [96.6 °F (35.9 °C)-98 °F (36.7 °C)] 96.6 °F (35.9 °C)  Pulse:  [] 69  Resp:  [13-37] 13  SpO2:  [88 %-100 %] 97 %  BP: (101-168)/(58-97) 154/97  Arterial Line BP: (103-172)/(52-76) 138/68     Weight: 68.9 kg (152 lb) (01/12/24 0900)  Body mass index is 25.29 kg/m².  Body surface area is 1.78 meters squared.    I/O last 3 completed shifts:  In: 2721.7 [I.V.:2403.8; IV Piggyback:317.9]  Out: 2077 [Urine:50; Other:2027]     Physical Exam  Constitutional:       General: She is not in acute distress.     Appearance: She is ill-appearing. She is not toxic-appearing.   HENT:      Head: Normocephalic and atraumatic.   Cardiovascular:      Heart sounds: Murmur heard.      Comments: Right permcath  Musculoskeletal:      Right lower leg: Edema present.      Left lower leg: Edema present.   Neurological:      Mental Status: She is alert.          Significant Labs:  All labs within the past 24 hours have been reviewed.     Significant Imaging:  Labs: Reviewed

## 2024-01-14 NOTE — PROGRESS NOTES
01/13/24 2200 01/13/24 2250   Treatment   Treatment Type SLED SLED   Treatment Status Blood returned Restart   Dialysis Machine Number [id number]   Dialyzer Time (hours) 0.21 0   BVP (Liters)  --  0 L   Solutions Labeled and Current   --  Yes   Access  --  Tunneled Cath;Right;IJ   Catheter Dressing Intact   --  Yes   Alarms Engaged  --  Yes   CRRT Comments rinseback done d/t to multiple air detect alarm SLED restarted

## 2024-01-14 NOTE — PLAN OF CARE
SICU PLAN OF CARE NOTE    Dx: Malignant carcinoid tumor of bronchus    Vital Signs (last 12 hours):   Temp:  [96.6 °F (35.9 °C)-98 °F (36.7 °C)]   Pulse:  []   Resp:  [16-36]   BP: (101-168)/(62-78)   SpO2:  [94 %-100 %]   Arterial Line BP: (103-162)/(52-76)      Neuro: AAO x4, Follows Commands, and Moves All Extremities    Cardiac: NSR, episodes of intermittent bradycardia (paced at 40), pulses palpable    Respiratory: Nasal Cannula 1 L    Gtts: Heparin    Urine Output: Anuric 0 cc/shift    Dialysis: SCUF, UF Rate: 350/hr    Diet: Clear Liquid     Labs/Accuchecks: AM Labs. aPTT AM draw, Accuchecks ACHS    Skin: Foam dressings in use; pt turned appropriately; incision site clean, dry, and intact; sacrum assessed    Shift Events:  Pt having intermittent episodes of bradycardia, transvenous pacer failing to sense and capture on occasion. CRRT 12 hours SLED/SCUF started. Pt taken to CT

## 2024-01-15 PROBLEM — J18.9 PARAPNEUMONIC EFFUSION: Status: ACTIVE | Noted: 2024-01-15

## 2024-01-15 PROBLEM — J91.8 PARAPNEUMONIC EFFUSION: Status: ACTIVE | Noted: 2024-01-15

## 2024-01-15 LAB
ALBUMIN SERPL BCP-MCNC: 2.3 G/DL (ref 3.5–5.2)
ALLENS TEST: ABNORMAL
ALLENS TEST: ABNORMAL
ALP SERPL-CCNC: 159 U/L (ref 55–135)
ALT SERPL W/O P-5'-P-CCNC: 5 U/L (ref 10–44)
ANION GAP SERPL CALC-SCNC: 10 MMOL/L (ref 8–16)
ANION GAP SERPL CALC-SCNC: 11 MMOL/L (ref 8–16)
ANION GAP SERPL CALC-SCNC: 8 MMOL/L (ref 8–16)
ANISOCYTOSIS BLD QL SMEAR: SLIGHT
APTT PPP: 22.3 SEC (ref 21–32)
APTT PPP: 37.6 SEC (ref 21–32)
APTT PPP: 49.8 SEC (ref 21–32)
APTT PPP: 56.1 SEC (ref 21–32)
AST SERPL-CCNC: 17 U/L (ref 10–40)
BACTERIA SPEC AEROBE CULT: ABNORMAL
BASOPHILS # BLD AUTO: ABNORMAL K/UL (ref 0–0.2)
BASOPHILS NFR BLD: 0 % (ref 0–1.9)
BILIRUB SERPL-MCNC: 0.7 MG/DL (ref 0.1–1)
BUN SERPL-MCNC: 19 MG/DL (ref 8–23)
BUN SERPL-MCNC: 20 MG/DL (ref 8–23)
BUN SERPL-MCNC: 25 MG/DL (ref 8–23)
CALCIUM SERPL-MCNC: 8.5 MG/DL (ref 8.7–10.5)
CALCIUM SERPL-MCNC: 8.9 MG/DL (ref 8.7–10.5)
CALCIUM SERPL-MCNC: 9 MG/DL (ref 8.7–10.5)
CHLORIDE SERPL-SCNC: 111 MMOL/L (ref 95–110)
CHLORIDE SERPL-SCNC: 113 MMOL/L (ref 95–110)
CHLORIDE SERPL-SCNC: 114 MMOL/L (ref 95–110)
CO2 SERPL-SCNC: 17 MMOL/L (ref 23–29)
CO2 SERPL-SCNC: 18 MMOL/L (ref 23–29)
CO2 SERPL-SCNC: 18 MMOL/L (ref 23–29)
CREAT SERPL-MCNC: 1.5 MG/DL (ref 0.5–1.4)
CREAT SERPL-MCNC: 1.6 MG/DL (ref 0.5–1.4)
CREAT SERPL-MCNC: 2 MG/DL (ref 0.5–1.4)
DELSYS: ABNORMAL
DELSYS: ABNORMAL
DIFFERENTIAL METHOD BLD: ABNORMAL
EOSINOPHIL # BLD AUTO: ABNORMAL K/UL (ref 0–0.5)
EOSINOPHIL NFR BLD: 0 % (ref 0–8)
ERYTHROCYTE [DISTWIDTH] IN BLOOD BY AUTOMATED COUNT: 18.9 % (ref 11.5–14.5)
ERYTHROCYTE [SEDIMENTATION RATE] IN BLOOD BY WESTERGREN METHOD: 22 MM/H
EST. GFR  (NO RACE VARIABLE): 25.9 ML/MIN/1.73 M^2
EST. GFR  (NO RACE VARIABLE): 33.8 ML/MIN/1.73 M^2
EST. GFR  (NO RACE VARIABLE): 36.6 ML/MIN/1.73 M^2
FIO2: 26
FLOW: 1.5
FLOW: 1.5
GLUCOSE SERPL-MCNC: 185 MG/DL (ref 70–110)
GLUCOSE SERPL-MCNC: 213 MG/DL (ref 70–110)
GLUCOSE SERPL-MCNC: 216 MG/DL (ref 70–110)
GRAM STN SPEC: ABNORMAL
HCO3 UR-SCNC: 16.3 MMOL/L (ref 24–28)
HCO3 UR-SCNC: 18.9 MMOL/L (ref 24–28)
HCT VFR BLD AUTO: 24.5 % (ref 37–48.5)
HGB BLD-MCNC: 8.4 G/DL (ref 12–16)
HYPOCHROMIA BLD QL SMEAR: ABNORMAL
IMM GRANULOCYTES # BLD AUTO: ABNORMAL K/UL (ref 0–0.04)
IMM GRANULOCYTES NFR BLD AUTO: ABNORMAL % (ref 0–0.5)
LYMPHOCYTES # BLD AUTO: ABNORMAL K/UL (ref 1–4.8)
LYMPHOCYTES NFR BLD: 12 % (ref 18–48)
MAGNESIUM SERPL-MCNC: 2 MG/DL (ref 1.6–2.6)
MAGNESIUM SERPL-MCNC: 2 MG/DL (ref 1.6–2.6)
MAGNESIUM SERPL-MCNC: 2.1 MG/DL (ref 1.6–2.6)
MAGNESIUM SERPL-MCNC: 2.1 MG/DL (ref 1.6–2.6)
MCH RBC QN AUTO: 28.4 PG (ref 27–31)
MCHC RBC AUTO-ENTMCNC: 34.3 G/DL (ref 32–36)
MCV RBC AUTO: 83 FL (ref 82–98)
METAMYELOCYTES NFR BLD MANUAL: 1 %
MODE: ABNORMAL
MODE: ABNORMAL
MONOCYTES # BLD AUTO: ABNORMAL K/UL (ref 0.3–1)
MONOCYTES NFR BLD: 4 % (ref 4–15)
MYELOCYTES NFR BLD MANUAL: 2 %
NEUTROPHILS NFR BLD: 81 % (ref 38–73)
NRBC BLD-RTO: 1 /100 WBC
OVALOCYTES BLD QL SMEAR: ABNORMAL
PCO2 BLDA: 27.6 MMHG (ref 35–45)
PCO2 BLDA: 30.6 MMHG (ref 35–45)
PH SMN: 7.38 [PH] (ref 7.35–7.45)
PH SMN: 7.4 [PH] (ref 7.35–7.45)
PHOSPHATE SERPL-MCNC: 2 MG/DL (ref 2.7–4.5)
PHOSPHATE SERPL-MCNC: 2.3 MG/DL (ref 2.7–4.5)
PHOSPHATE SERPL-MCNC: 2.9 MG/DL (ref 2.7–4.5)
PLATELET # BLD AUTO: 329 K/UL (ref 150–450)
PLATELET BLD QL SMEAR: ABNORMAL
PMV BLD AUTO: 11.4 FL (ref 9.2–12.9)
PO2 BLDA: 79 MMHG (ref 80–100)
PO2 BLDA: 87 MMHG (ref 80–100)
POC BE: -6 MMOL/L
POC BE: -9 MMOL/L
POC SATURATED O2: 96 % (ref 95–100)
POC SATURATED O2: 97 % (ref 95–100)
POC TCO2: 17 MMOL/L (ref 23–27)
POC TCO2: 20 MMOL/L (ref 23–27)
POCT GLUCOSE: 204 MG/DL (ref 70–110)
POCT GLUCOSE: 216 MG/DL (ref 70–110)
POCT GLUCOSE: 217 MG/DL (ref 70–110)
POCT GLUCOSE: 234 MG/DL (ref 70–110)
POIKILOCYTOSIS BLD QL SMEAR: SLIGHT
POLYCHROMASIA BLD QL SMEAR: ABNORMAL
POTASSIUM SERPL-SCNC: 4.5 MMOL/L (ref 3.5–5.1)
POTASSIUM SERPL-SCNC: 4.7 MMOL/L (ref 3.5–5.1)
POTASSIUM SERPL-SCNC: 4.8 MMOL/L (ref 3.5–5.1)
PROT SERPL-MCNC: 6.2 G/DL (ref 6–8.4)
RBC # BLD AUTO: 2.96 M/UL (ref 4–5.4)
SAMPLE: ABNORMAL
SAMPLE: ABNORMAL
SITE: ABNORMAL
SITE: ABNORMAL
SODIUM SERPL-SCNC: 140 MMOL/L (ref 136–145)
TARGETS BLD QL SMEAR: ABNORMAL
WBC # BLD AUTO: 26.79 K/UL (ref 3.9–12.7)

## 2024-01-15 PROCEDURE — 25000242 PHARM REV CODE 250 ALT 637 W/ HCPCS

## 2024-01-15 PROCEDURE — 63600175 PHARM REV CODE 636 W HCPCS: Performed by: STUDENT IN AN ORGANIZED HEALTH CARE EDUCATION/TRAINING PROGRAM

## 2024-01-15 PROCEDURE — 90945 DIALYSIS ONE EVALUATION: CPT

## 2024-01-15 PROCEDURE — 82803 BLOOD GASES ANY COMBINATION: CPT

## 2024-01-15 PROCEDURE — 25000003 PHARM REV CODE 250

## 2024-01-15 PROCEDURE — 83735 ASSAY OF MAGNESIUM: CPT | Performed by: STUDENT IN AN ORGANIZED HEALTH CARE EDUCATION/TRAINING PROGRAM

## 2024-01-15 PROCEDURE — 63600175 PHARM REV CODE 636 W HCPCS

## 2024-01-15 PROCEDURE — 94761 N-INVAS EAR/PLS OXIMETRY MLT: CPT | Mod: XB

## 2024-01-15 PROCEDURE — 25000003 PHARM REV CODE 250: Performed by: SURGERY

## 2024-01-15 PROCEDURE — 25000003 PHARM REV CODE 250: Performed by: STUDENT IN AN ORGANIZED HEALTH CARE EDUCATION/TRAINING PROGRAM

## 2024-01-15 PROCEDURE — 94668 MNPJ CHEST WALL SBSQ: CPT

## 2024-01-15 PROCEDURE — 85730 THROMBOPLASTIN TIME PARTIAL: CPT | Mod: 91

## 2024-01-15 PROCEDURE — 85730 THROMBOPLASTIN TIME PARTIAL: CPT | Mod: 91 | Performed by: STUDENT IN AN ORGANIZED HEALTH CARE EDUCATION/TRAINING PROGRAM

## 2024-01-15 PROCEDURE — 27200667 HC PACEMAKER, TEMPORARY MONITORING, PER SHIFT

## 2024-01-15 PROCEDURE — 94640 AIRWAY INHALATION TREATMENT: CPT

## 2024-01-15 PROCEDURE — 99233 SBSQ HOSP IP/OBS HIGH 50: CPT | Mod: ,,, | Performed by: INTERNAL MEDICINE

## 2024-01-15 PROCEDURE — 63600175 PHARM REV CODE 636 W HCPCS: Mod: JZ | Performed by: NURSE PRACTITIONER

## 2024-01-15 PROCEDURE — 80069 RENAL FUNCTION PANEL: CPT | Mod: 91 | Performed by: INTERNAL MEDICINE

## 2024-01-15 PROCEDURE — 25000003 PHARM REV CODE 250: Performed by: INTERNAL MEDICINE

## 2024-01-15 PROCEDURE — 27000221 HC OXYGEN, UP TO 24 HOURS

## 2024-01-15 PROCEDURE — 99232 SBSQ HOSP IP/OBS MODERATE 35: CPT | Mod: ,,, | Performed by: ANESTHESIOLOGY

## 2024-01-15 PROCEDURE — 85007 BL SMEAR W/DIFF WBC COUNT: CPT | Performed by: SURGERY

## 2024-01-15 PROCEDURE — 83735 ASSAY OF MAGNESIUM: CPT | Mod: 91 | Performed by: STUDENT IN AN ORGANIZED HEALTH CARE EDUCATION/TRAINING PROGRAM

## 2024-01-15 PROCEDURE — 37799 UNLISTED PX VASCULAR SURGERY: CPT

## 2024-01-15 PROCEDURE — 83735 ASSAY OF MAGNESIUM: CPT | Mod: 91 | Performed by: INTERNAL MEDICINE

## 2024-01-15 PROCEDURE — 82800 BLOOD PH: CPT

## 2024-01-15 PROCEDURE — 80053 COMPREHEN METABOLIC PANEL: CPT | Performed by: SURGERY

## 2024-01-15 PROCEDURE — 84100 ASSAY OF PHOSPHORUS: CPT | Performed by: SURGERY

## 2024-01-15 PROCEDURE — 99232 SBSQ HOSP IP/OBS MODERATE 35: CPT | Mod: ,,, | Performed by: NURSE PRACTITIONER

## 2024-01-15 PROCEDURE — 85027 COMPLETE CBC AUTOMATED: CPT | Performed by: SURGERY

## 2024-01-15 PROCEDURE — 99291 CRITICAL CARE FIRST HOUR: CPT | Mod: ,,, | Performed by: INTERNAL MEDICINE

## 2024-01-15 PROCEDURE — 85730 THROMBOPLASTIN TIME PARTIAL: CPT

## 2024-01-15 PROCEDURE — 25000003 PHARM REV CODE 250: Performed by: NURSE PRACTITIONER

## 2024-01-15 PROCEDURE — 11000001 HC ACUTE MED/SURG PRIVATE ROOM

## 2024-01-15 PROCEDURE — 99900035 HC TECH TIME PER 15 MIN (STAT)

## 2024-01-15 RX ORDER — MAGNESIUM SULFATE HEPTAHYDRATE 40 MG/ML
2 INJECTION, SOLUTION INTRAVENOUS
Status: DISPENSED | OUTPATIENT
Start: 2024-01-15 | End: 2024-01-16

## 2024-01-15 RX ORDER — SODIUM,POTASSIUM PHOSPHATES 280-250MG
2 POWDER IN PACKET (EA) ORAL ONCE
Status: COMPLETED | OUTPATIENT
Start: 2024-01-15 | End: 2024-01-15

## 2024-01-15 RX ORDER — HYDROCODONE BITARTRATE AND ACETAMINOPHEN 500; 5 MG/1; MG/1
TABLET ORAL CONTINUOUS
Status: ACTIVE | OUTPATIENT
Start: 2024-01-15 | End: 2024-01-16

## 2024-01-15 RX ORDER — SODIUM CHLORIDE 9 MG/ML
INJECTION, SOLUTION INTRAVENOUS CONTINUOUS
Status: DISCONTINUED | OUTPATIENT
Start: 2024-01-15 | End: 2024-01-20

## 2024-01-15 RX ORDER — POLYETHYLENE GLYCOL 3350 17 G/17G
17 POWDER, FOR SOLUTION ORAL DAILY
Status: DISCONTINUED | OUTPATIENT
Start: 2024-01-15 | End: 2024-01-31

## 2024-01-15 RX ORDER — HEPARIN SODIUM,PORCINE/D5W 25000/250
0-40 INTRAVENOUS SOLUTION INTRAVENOUS CONTINUOUS
Status: ACTIVE | OUTPATIENT
Start: 2024-01-15 | End: 2024-01-16

## 2024-01-15 RX ORDER — HEPARIN SODIUM,PORCINE/D5W 25000/250
0-40 INTRAVENOUS SOLUTION INTRAVENOUS CONTINUOUS
Status: DISCONTINUED | OUTPATIENT
Start: 2024-01-15 | End: 2024-01-15

## 2024-01-15 RX ORDER — AMOXICILLIN 250 MG
1 CAPSULE ORAL DAILY
Status: DISCONTINUED | OUTPATIENT
Start: 2024-01-15 | End: 2024-02-06 | Stop reason: HOSPADM

## 2024-01-15 RX ADMIN — SEVELAMER CARBONATE 1600 MG: 800 TABLET, FILM COATED ORAL at 11:01

## 2024-01-15 RX ADMIN — INSULIN ASPART 4 UNITS: 100 INJECTION, SOLUTION INTRAVENOUS; SUBCUTANEOUS at 04:01

## 2024-01-15 RX ADMIN — OXYBUTYNIN CHLORIDE 5 MG: 5 TABLET ORAL at 08:01

## 2024-01-15 RX ADMIN — ACETYLCYSTEINE 4 ML: 100 INHALANT RESPIRATORY (INHALATION) at 08:01

## 2024-01-15 RX ADMIN — INSULIN ASPART 4 UNITS: 100 INJECTION, SOLUTION INTRAVENOUS; SUBCUTANEOUS at 08:01

## 2024-01-15 RX ADMIN — SODIUM CHLORIDE: 9 INJECTION, SOLUTION INTRAVENOUS at 08:01

## 2024-01-15 RX ADMIN — MEROPENEM 2 G: 1 INJECTION INTRAVENOUS at 11:01

## 2024-01-15 RX ADMIN — SODIUM CHLORIDE: 9 INJECTION, SOLUTION INTRAVENOUS at 07:01

## 2024-01-15 RX ADMIN — DRONABINOL 5 MG: 2.5 CAPSULE ORAL at 08:01

## 2024-01-15 RX ADMIN — METOCLOPRAMIDE HYDROCHLORIDE 5 MG: 10 INJECTION, SOLUTION INTRAMUSCULAR; INTRAVENOUS at 06:01

## 2024-01-15 RX ADMIN — HEPARIN SODIUM AND DEXTROSE 16 UNITS/KG/HR: 10000; 5 INJECTION INTRAVENOUS at 11:01

## 2024-01-15 RX ADMIN — OXYBUTYNIN CHLORIDE 5 MG: 5 TABLET ORAL at 02:01

## 2024-01-15 RX ADMIN — SEVELAMER CARBONATE 1600 MG: 800 TABLET, FILM COATED ORAL at 08:01

## 2024-01-15 RX ADMIN — METOCLOPRAMIDE HYDROCHLORIDE 5 MG: 10 INJECTION, SOLUTION INTRAMUSCULAR; INTRAVENOUS at 05:01

## 2024-01-15 RX ADMIN — ERYTHROPOIETIN 4000 UNITS: 4000 INJECTION, SOLUTION INTRAVENOUS; SUBCUTANEOUS at 09:01

## 2024-01-15 RX ADMIN — INSULIN ASPART 4 UNITS: 100 INJECTION, SOLUTION INTRAVENOUS; SUBCUTANEOUS at 12:01

## 2024-01-15 RX ADMIN — MEROPENEM 1 G: 1 INJECTION INTRAVENOUS at 02:01

## 2024-01-15 RX ADMIN — METOCLOPRAMIDE HYDROCHLORIDE 5 MG: 10 INJECTION, SOLUTION INTRAMUSCULAR; INTRAVENOUS at 11:01

## 2024-01-15 RX ADMIN — ACETYLCYSTEINE 4 ML: 100 INHALANT RESPIRATORY (INHALATION) at 02:01

## 2024-01-15 RX ADMIN — SODIUM CHLORIDE: 9 INJECTION, SOLUTION INTRAVENOUS at 02:01

## 2024-01-15 RX ADMIN — INSULIN DETEMIR 10 UNITS: 100 INJECTION, SOLUTION SUBCUTANEOUS at 11:01

## 2024-01-15 RX ADMIN — ALBUTEROL SULFATE 2.5 MG: 2.5 SOLUTION RESPIRATORY (INHALATION) at 02:01

## 2024-01-15 RX ADMIN — ACETYLCYSTEINE 4 ML: 100 INHALANT RESPIRATORY (INHALATION) at 07:01

## 2024-01-15 RX ADMIN — POTASSIUM & SODIUM PHOSPHATES POWDER PACK 280-160-250 MG 2 PACKET: 280-160-250 PACK at 04:01

## 2024-01-15 RX ADMIN — PANTOPRAZOLE SODIUM 40 MG: 40 TABLET, DELAYED RELEASE ORAL at 08:01

## 2024-01-15 RX ADMIN — METOROPROLOL TARTRATE 5 MG: 5 INJECTION, SOLUTION INTRAVENOUS at 08:01

## 2024-01-15 RX ADMIN — ALBUTEROL SULFATE 2.5 MG: 2.5 SOLUTION RESPIRATORY (INHALATION) at 08:01

## 2024-01-15 RX ADMIN — ALBUTEROL SULFATE 2.5 MG: 2.5 SOLUTION RESPIRATORY (INHALATION) at 07:01

## 2024-01-15 RX ADMIN — LIDOCAINE 5% 1 PATCH: 700 PATCH TOPICAL at 08:01

## 2024-01-15 RX ADMIN — PIPERACILLIN SODIUM AND TAZOBACTAM SODIUM 4.5 G: 4; .5 INJECTION, POWDER, FOR SOLUTION INTRAVENOUS at 08:01

## 2024-01-15 RX ADMIN — POLYETHYLENE GLYCOL 3350 17 G: 17 POWDER, FOR SOLUTION ORAL at 11:01

## 2024-01-15 NOTE — PROGRESS NOTES
[facility] - [facility]  Critical Care - Surgery  Progress Note    Patient Name: Lily Green  MRN: [id number]  Admission Date: 1/2/2024  Hospital Length of Stay: 13 days  Code Status: Full Code  Attending Provider: Tan Thomson MD  Primary Care Provider: Pavel Calixto MD   Principal Problem: Malignant carcinoid tumor of bronchus    Subjective:     Hospital/ICU Course:  No notes on file    Interval History/Significant Events: POD 13. Patient still tachy eulogio requiring intermittent pacing with backup TVP set to 40. Had RVR yesterday to 180's s/p 3 x IV metoprolol with only mild improvement. Later she spontaneously converted to NSR. PO intake somewhat improved since advancing and adding back her home appetite stimulant. At midnight will be NPO and heparin gtt off for PPM tmrw.    Follow-up For: Procedure(s) (LRB):  Pacemaker, Temporary, Transvenous (Right)    Post-Operative Day: 3 Days Post-Op    Objective:     Vital Signs (Most Recent):  Temp: 97.4 °F (36.3 °C) (01/15/24 0300)  Pulse: 77 (01/15/24 0715)  Resp: (!) 34 (01/15/24 0715)  BP: 119/85 (01/15/24 0600)  SpO2: 97 % (01/15/24 0340) Vital Signs (24h Range):  Temp:  [95.7 °F (35.4 °C)-97.5 °F (36.4 °C)] 97.4 °F (36.3 °C)  Pulse:  [] 77  Resp:  [18-41] 34  SpO2:  [94 %-97 %] 97 %  BP: ()/(58-85) 119/85  Arterial Line BP: ()/(55-77) 162/73     Weight: 68.9 kg (152 lb)  Body mass index is 25.29 kg/m².      Intake/Output Summary (Last 24 hours) at 1/15/2024 0740  Last data filed at 1/15/2024 0701  Gross per 24 hour   Intake 3543.75 ml   Output 4323 ml   Net -779.25 ml          Physical Exam  Vitals and nursing note reviewed.   Constitutional:       General: She is not in acute distress.     Appearance: Normal appearance. She is normal weight. She is ill-appearing. She is not toxic-appearing.   HENT:      Head: Normocephalic and atraumatic.      Right Ear: External ear normal.      Left Ear: External ear normal.       Nose: Nose normal.      Comments: Nasal cannula in place  Eyes:      General: No scleral icterus.        Right eye: No discharge.         Left eye: No discharge.      Extraocular Movements: Extraocular movements intact.      Conjunctiva/sclera: Conjunctivae normal.   Cardiovascular:      Rate and Rhythm: Normal rate and regular rhythm.   Pulmonary:      Effort: Pulmonary effort is normal. No respiratory distress.   Abdominal:      General: Abdomen is flat. There is no distension.      Palpations: Abdomen is soft. There is no mass.      Tenderness: There is no abdominal tenderness. There is no guarding.      Hernia: No hernia is present.   Genitourinary:     Comments: TVP wire in place  Musculoskeletal:         General: Normal range of motion.      Cervical back: Normal range of motion and neck supple.   Skin:     General: Skin is warm and dry.   Neurological:      General: No focal deficit present.      Mental Status: She is alert and oriented to person, place, and time. Mental status is at baseline.   Psychiatric:         Mood and Affect: Mood normal.         Behavior: Behavior normal.         Thought Content: Thought content normal.            Vents:  Oxygen Concentration (%): 24 (01/15/24 0200)    Lines/Drains/Airways       Central Venous Catheter Line  Duration                  Hemodialysis Catheter 01/25/23 1501 right internal jugular 354 days    Introducer 01/12/24 1100 Femoral Vein Right;Femoral Right 2 days              Drain  Duration             Female External Urinary Catheter w/ Suction 01/13/24 0701 2 days              Arterial Line  Duration             Arterial Line 01/08/24 1324 Right Radial 6 days              Line  Duration                  Pacer Wires 01/12/24 1032 2 days              Peripheral Intravenous Line  Duration                  Hemodialysis AV Fistula 09/01/23 0800 Left forearm 136 days         Peripheral IV - Single Lumen 01/08/24 1108 22 G Posterior;Right Wrist 6 days          Peripheral IV - Single Lumen 01/08/24 1357 20 G Posterior;Right Forearm 6 days         Peripheral IV - Single Lumen 01/12/24 1415 20 G;1 3/4 in Right Forearm 2 days                    Significant Labs:    CBC/Anemia Profile:  Recent Labs   Lab 01/14/24  0223 01/15/24  0201   WBC 25.47* 26.79*   HGB 8.8* 8.4*   HCT 25.0* 24.5*    329   MCV 81* 83   RDW 18.5* 18.9*        Chemistries:  Recent Labs   Lab 01/14/24 0223 01/14/24  1308 01/14/24  1646 01/14/24  2059 01/15/24  0201     139   < > 142 138  138 140   K 4.5  4.4   < > 5.0 4.5  4.5 4.5     106   < > 107 103  103 113*   CO2 22*  22*   < > 17* 21*  21* 17*   BUN 28*  28*   < > 43* 47*  47* 20   CREATININE 2.3*  2.4*   < > 3.5* 3.3*  3.3* 1.6*   CALCIUM 9.0  9.0   < > 8.7 8.4*  8.4* 9.0   ALBUMIN 2.3*  2.3*   < > 2.2* 2.2*  2.2* 2.3*   PROT 6.4  --   --   --  6.2   BILITOT 0.7  --   --   --  0.7   ALKPHOS 182*  --   --   --  159*   ALT 5*  --   --   --  5*   AST 21  --   --   --  17   MG 2.2   < > 2.3 2.2  2.2 2.1  2.1   PHOS 3.0  3.0   < > 4.7* 3.9  3.9 2.0*    < > = values in this interval not displayed.       All pertinent labs within the past 24 hours have been reviewed.    Significant Imaging:  I have reviewed all pertinent imaging results/findings within the past 24 hours.  Assessment/Plan:     Cardiac/Vascular  Atrial fibrillation  72 yo female with 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 Stepped up to the unit for persistent afib rvr. Had SBO (now resolved). 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       Neuro/Psych:   -- Sedation: none  -- Pain: Lidocaine patch   -- has some baseline anxiety, no longer on meds     Cards:   -- BP goals SBP >90, MAPs >55  -- Afib RVR   - Amio and BB held post cardiac arrest   - prn iv metoprolol for sustained RVR > 130's   - heparin gtt for  anticoagulation    - transition to oral regiment once procedures complete  -- Asystolic arrest   - 1/12/24 patient went asystole. Had multiple bradycardic events prior to which had self resolved. At time of asystole, nurse performed 1 min of chest compressions and achieved ROSC. STAT TVP placement unable to be obtained due to central venous stenosis. 2nd episode of asystole resolved after minimal compressions. 3rd episode of asystole resolved with singular compression. S/p TVP via groin in Cath lab.   - TVP placed @ back up rate of 40   - tentative plan for PPM on 1/16, heparin gtt to be held 24 hours before and 5 days after   - digoxin level high --> administered digibind 1/12, transitioned to normal sinus after administration   - dig level within normal limits x 2     Pulm:   -- Goal O2 sat > 90%  -- on 1L satting upper 90's.  -- Chest tube d/c'd 1/8/24  -- CXR with pleural effusions and RLL opacification  -- CT chest (1/13): R worse than L pleural effusion, pericardial effusion   -- Thoracic surgery recommending possible IR procedure for pleural effusions later in week  -- continue pulmonary toilet with acetylcysteine and prn albuterol nebulizer        Renal:  -- history of ESRD, dialysis dependent   - CRRT per nephrology  -- trend BUN/Cr  -- continue sevelamer TIDWM  -- patient is oliguric  -- permacath in place last exchanged 10/2023  -- fistula unable to be used  -- oxybutynin in effort to reduce possible bladder spasm triggering eulogio episodes    Recent Labs   Lab 01/14/24  1646 01/14/24  2059 01/15/24  0201   BUN 43* 47*  47* 20   CREATININE 3.5* 3.3*  3.3* 1.6*           FEN / GI:   -- CT on 1/8/24 demonstrating SBO, now resolved  -- US on 1/8/24 showing distended gallbladder without signs of acute cholecystitis   - NGT removed.  - Equivocal GGC 1/10  - Scheduled reglan for gastric motility  -- PPI  -- Replace lytes as needed  -- Nutrition: diabetic/renal/gluten free soft and bite sized diet with  novasource daily  - We will work to advance diet today, needs assistance with eating/drinking as she is restricted to laying flat up to 30 degree head elevation.  Aspiration precautions are of utmost importance. Dietician consulted for complex dietary restriction  - home appetite stimulant dronabinol 5 mg BID      ID:   -- Tm: afebrile; WBC 27  -- C/f HAP with RLL consolidation  -- MRSA neg, dc vanc  -- Cont zosyn for 7 day course  -- blood cultures with 1 bottle growing staph epi, likely a contaminant.  -- possible IR procedure on pleural effusions later this week, if so would extend abx coverage 4 days after source control achieved and f/u cultures from procedure    Recent Labs   Lab 01/13/24  0325 01/14/24  0223 01/15/24  0201   WBC 18.98* 25.47* 26.79*           Heme/Onc:  -- Daily CBC  -- on heparin gtt for A-fib, hold 24 hours before PPM 1/16  Recent Labs   Lab 01/11/24  0358 01/12/24  0422 01/12/24  0631 01/12/24  1316 01/13/24  0325 01/13/24  0428 01/13/24  1026 01/14/24  0223 01/15/24  0201   HGB 9.0* 8.9*  --   --  8.4*  --   --  8.8* 8.4*    312  --   --  308  --   --  338 329   APTT 39.0* 33.9* 62.5*   < >  --    < > 44.7* 45.3* 49.8*   INR 1.2 1.2 1.2  --   --   --   --   --   --     < > = values in this interval not displayed.           Endo:   -- history of diabetes  -- Gluc goal 140-180  -- SSI      PPx:   Feeding: diabetic/renal/gluten diet and novasource  Analgesia/Sedation:  Pain is controlled, no sedation  Thromboembolic prevention: heparin gtt held for PPM  HOB >30: yes  Stress Ulcer ppx: pantoprazole  Glucose control: Critical care goal 140-180 g/dl, SSI  Bowel reg:  having BM  Invasive Lines/Drains/Airway: Permacath, PIV x2, TVP groin line  Deescalation: none        Dispo/Code Status/Palliative:   -- SICU/ Full Code       Gissel Jack MD  Critical Care - Surgery  [facility] - [facility]

## 2024-01-15 NOTE — PROGRESS NOTES
[facility]  Nephrology  Progress Note    Patient Name: Lily Green  MRN: [id number]  Admission Date: 1/2/2024  Hospital Length of Stay: 13 days  Attending Provider: Tan Thomson MD   Primary Care Physician: Pavel Calixto MD  Principal Problem:Malignant carcinoid tumor of bronchus    Subjective:     HPI: Patient is a 73 y.o. 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 S/p thoracotomy. Last HD prior to presentation 1/1/24. Nephrology consulted for ESRD on HD.        Interval History:     The patient received SLED/SCUF for the past 12 hours. Will rinse back at 16:00 and restart for 8 hours SLED tonight. Her hemodynamic condition is better. The orders will be adjusted according to the hemodynamic and metabolic parameters.       Review of patient's allergies indicates:   Allergen Reactions    Crestor [rosuvastatin] Swelling    Gluten protein      Current Facility-Administered Medications   Medication Frequency    0.9%  NaCl infusion (CRRT USE ONLY) Continuous    0.9%  NaCl infusion PRN    0.9%  NaCl infusion Continuous    acetylcysteine 100 mg/ml (10%) solution 4 mL TID WAKE    albuterol sulfate nebulizer solution 2.5 mg Q4H PRN    bisacodyL suppository 10 mg Daily PRN    dextrose 10% bolus 125 mL 125 mL PRN    dextrose 10% bolus 250 mL 250 mL PRN    droNABinol capsule 5 mg BID    epoetin noble injection 4,000 Units Every Mon, Wed, Fri    glucagon (human recombinant) injection 1 mg PRN    glucose chewable tablet 16 g PRN    glucose chewable tablet 24 g PRN    heparin (porcine) injection 1,000 Units PRN    heparin 25,000 units in dextrose 5% 250 mL (100 units/mL) infusion LOW INTENSITY nomogram - [facility] Continuous    hydrALAZINE injection 10 mg Q6H PRN    insulin aspart U-100 pen 0-10 Units Q4H PRN    insulin detemir U-100 (Levemir) pen 10 Units Daily    LIDOcaine  "5 % patch 1 patch Q24H    magnesium sulfate 2g in water 50mL IVPB (premix) PRN    meropenem (MERREM) 2 g in sodium chloride 0.9% 100 mL IVPB Q12H    metoclopramide injection 5 mg Q6H    metoprolol injection 5 mg Q5 Min PRN    ondansetron injection 8 mg Q8H PRN    oxybutynin tablet 5 mg TID    pantoprazole EC tablet 40 mg Daily    polyethylene glycol packet 17 g Daily    senna-docusate 8.6-50 mg per tablet 1 tablet Daily    sodium chloride 0.9% flush 10 mL PRN       Objective:     Vital Signs (Most Recent):  Temp: 97.4 °F (36.3 °C) (01/15/24 1515)  Pulse: 79 (01/15/24 1600)  Resp: (!) 26 (01/15/24 1600)  BP: (!) 154/70 (01/15/24 1600)  SpO2: 96 % (01/15/24 1600) Vital Signs (24h Range):  Temp:  [97.4 °F (36.3 °C)-97.6 °F (36.4 °C)] 97.4 °F (36.3 °C)  Pulse:  [] 79  Resp:  [20-43] 26  SpO2:  [86 %-98 %] 96 %  BP: (105-156)/(58-85) 154/70  Arterial Line BP: (102-178)/(50-78) 129/54     Weight: 68.9 kg (152 lb) (01/12/24 0900)  Body mass index is 25.29 kg/m².  Body surface area is 1.78 meters squared.    I/O last 3 completed shifts:  In: 5342.9 [P.O.:336; I.V.:4738.3; IV Piggyback:268.6]  Out: 6427 [Other:6427]     Physical Exam  HENT:      Head: Normocephalic.   Cardiovascular:      Rate and Rhythm: Normal rate.      Pulses: Normal pulses.   Musculoskeletal:      Cervical back: Normal range of motion.   Neurological:      Mental Status: She is alert.          Significant Labs:  ABGs:   Recent Labs   Lab 01/15/24  0835   PH 7.379   PCO2 27.6*   HCO3 16.3*   POCSATURATED 96   BE -9*     BMP:   Recent Labs   Lab 01/15/24  1430   *      K 4.7   *   CO2 18*   BUN 25*   CREATININE 2.0*   CALCIUM 8.9   MG 2.0     Cardiac Markers: No results for input(s): "CKMB", "TROPONINT", "MYOGLOBIN" in the last 168 hours.  CBC:   Recent Labs   Lab 01/15/24  0201   WBC 26.79*   RBC 2.96*   HGB 8.4*   HCT 24.5*      MCV 83   MCH 28.4   MCHC 34.3     CMP:   Recent Labs   Lab 01/15/24  0201 01/15/24  1430   GLU " 185* 216*   CALCIUM 9.0 8.9   ALBUMIN 2.3* 2.3*   PROT 6.2  --     140   K 4.5 4.7   CO2 17* 18*   * 114*   BUN 20 25*   CREATININE 1.6* 2.0*   ALKPHOS 159*  --    ALT 5*  --    AST 17  --    BILITOT 0.7  --      LFTs:   Recent Labs   Lab 01/15/24  0201 01/15/24  1430   ALT 5*  --    AST 17  --    ALKPHOS 159*  --    BILITOT 0.7  --    PROT 6.2  --    ALBUMIN 2.3* 2.3*     All labs within the past 24 hours have been reviewed.     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  ESRD had SCUF/SLED 6/6. Will have 8 hours SLED tonight with adjustment of the orders according to the hemodynamic and metabolic parameters.    HTN with better BP.  A fib  GERD  Hyperlipid  Disk disease  Celiac disease  Irritable bowel disease  Anxiety      AMANDEEP WEBB.Sandra. MD. SHEREE. FACP.  , [employer] / [employer]  Nephrology Consultant. [facility].   [address],  [facility]. 5th [place] [address].    email: [email or web address].  Tel: Office: [phone]

## 2024-01-15 NOTE — CONSULTS
"[facility] - [facility] Intensive Care  Infectious Disease  Consult Note    Patient Name: Lily Green  MRN: [id number]  Admission Date: 1/2/2024  Hospital Length of Stay: 13 days  Attending Physician: Tan Thomson MD  Primary Care Provider: Pavel Calixto MD     Isolation Status: No active isolations    Patient information was obtained from patient, past medical records, and ER records.      Inpatient consult to Infectious Diseases  Consult performed by: Michelle Coburn MD  Consult ordered by: Gissel Jack MD        Assessment/Plan:     Pulmonary  Parapneumonic effusion  73 y.o F with DM2, GERD, Celiac's who is found to have RML Carcinoid tumor presented on 1/2 for lymphadenectomy,  robotic-assisted surgery converted to right thoracotomy with therapeutic wedge resection and open mediastinal lymph node dissection. Stepped up to the unit after converting into afib RVR after dialysis 1/5. Hospital course also c/b ileus s/p NGT, tachy-eulogio syndrome requiring chest compressions and now s/p temporary pacemaker placement 1/12. CT chest on 1/13 revealed mediastinal LAD, "Moderate-sized bilateral pleural effusions, increased from 01/08/2024; likely multiply-loculated on the right. Underlying pleural infection or pleural neoplasm not excluded."     ID consulted for: "needs meropenem for right sided loculated pleural effusion, possible IR drainage later this week." WBC uptrending despite being on pip-tazo since 1/11.unclear if due to inflammatory response to recent procedures vs infection. BCX 1/11 grew CONS likely contaminant. Repeat on 1/12 is ngtd. RCx 1/12 grew e coli and klebsiella.     Recommendations:  --ok to broaden to meropenem pending workup of R effusion  --ICU planning on diagnostic thora later this week- send for cell count, protein, ldh, aerobic/ anaerobic/ fungal/ afb cultures, cytology  --reconsult ID with thora results for final recs.      Renal/  ESRD (end stage renal " "disease)  Renally dose antibiotics        Thank you for your consult. I will sign off. Please contact us if you have any additional questions.    Michelle Coburn MD  Infectious Disease  [facility] - Surgical Int[facility]    Subjective:     Principal Problem: Malignant carcinoid tumor of bronchus    HPI: 73 y.o. female never smoker with DDD, benign essential tremor, meniere's disease, HTN, HLD, DM2, GERD, Celiac's, IBS who is found to have RML Carcinoid tumor presented on  for lymphadenectomy,  robotic-assisted surgery converted to right thoracotomy with therapeutic wedge resection and open mediastinal lymph node dissection. Stepped up to the unit after converting into afib RVR after dialysis . She was given 3 doses of metoprolol 5, without any response and her heart rate or rhythm.  The oxygen requirements increased to 5 L from room air.      CT chest on  revealed mediastinal LAD, Moderate-sized bilateral pleural effusions, increased from 2024; likely multiply-loculated on the right. Underlying pleural infection or pleural neoplasm not excluded.     ID consulted for: "needs meropenem for right sided loculated pleural effusion, possible IR drainage later this week."    Past Medical History:   Diagnosis Date    Anxiety     Celiac disease 2018    Celiac disease     Depression     Diabetes mellitus     Family history of breast cancer in mother      at age 68    Hyperlipidemia     Hypertension     Meniere disease     Meniere's disease, unspecified ear     Menopause     Peptic ulcer     Reflux esophagitis     Urinary tract infection     Vaginal infection     Vaginal Pap smear 2014    Pap/Hpv Negative        Past Surgical History:   Procedure Laterality Date    APPENDECTOMY      BREAST SURGERY      Tags    CARPAL TUNNEL RELEASE Bilateral 2017    ,     CLOSURE, COLOSTOMY Left 2023    Procedure: CLOSURE, COLOSTOMY;  Surgeon: Manuel Javier MD;  Location: [facility] OR;  " Service: General;  Laterality: Left;    COLONOSCOPY  04/2018    Normal  (Cornell Velazco)     COLOSTOMY Right 1/16/2023    Procedure: CREATION, COLOSTOMY;  Surgeon: Manuel Javier MD;  Location: [facility] OR;  Service: General;  Laterality: Right;    DILATION AND CURETTAGE OF UTERUS  1986    DUPUYTREN CONTRACTURE RELEASE Bilateral 09/2017    HYSTERECTOMY  1987    BEAU w/ appy, no BSO     INJECTION OF NEUROLYTIC AGENT AROUND LUMBAR SYMPATHETIC NERVE N/A 1/6/2022    Procedure: BLOCK, LUMBAR SYMPATHETIC;  Surgeon: Souleymane Rizo Jr., MD;  Location: [facility] PAIN MGT;  Service: Pain Management;  Laterality: N/A;  no pacemaker   pt is diabetic     INJECTION OF NEUROLYTIC AGENT AROUND LUMBAR SYMPATHETIC NERVE N/A 8/25/2022    Procedure: BLOCK, LUMBAR SYMPATHETIC;  Surgeon: Souleymane Rizo Jr., MD;  Location: [facility] PAIN MGT;  Service: Pain Management;  Laterality: N/A;  diabetic     INSERTION OF TUNNELED CENTRAL VENOUS HEMODIALYSIS CATHETER Right 1/25/2023    Procedure: INSERTION, CATHETER, HEMODIALYSIS, DUAL LUMEN;  Surgeon: Manuel Javier MD;  Location: [facility] OR;  Service: General;  Laterality: Right;    INSERTION, CATHETER, TUNNELED Left 1/28/2023    Procedure: Insertion,catheter,tunneled;  Surgeon: Watson Fontenot MD;  Location: [facility] OR;  Service: General;  Laterality: Left;    LAPAROTOMY, EXPLORATORY N/A 1/14/2023    Procedure: LAPAROTOMY, EXPLORATORY;  Surgeon: Manuel Javier MD;  Location: [facility] OR;  Service: General;  Laterality: N/A;    LAPAROTOMY, EXPLORATORY N/A 1/16/2023    Procedure: LAPAROTOMY, EXPLORATORY;  Surgeon: Manuel Javier MD;  Location: [facility] OR;  Service: General;  Laterality: N/A;    LYMPHADENECTOMY Right 1/2/2024    Procedure: LYMPHADENECTOMY;  Surgeon: Tan Thomson MD;  Location: [address];  Service: Cardiothoracic;  Laterality: Right;    REMOVAL OF CATHETER Right 1/28/2023    Procedure: REMOVAL, CATHETER;  Surgeon: Watson Fontenot MD;  Location: [facility] OR;  Service: General;   Laterality: Right;    REMOVAL, TUNNELED CATH Right 1/25/2023    Procedure: REMOVAL, TUNNELED CATH;  Surgeon: Manuel Javier MD;  Location: [facility];  Service: General;  Laterality: Right;    ROBOTIC BRONCHOSCOPY N/A 10/20/2023    Procedure: ROBOTIC BRONCHOSCOPY;  Surgeon: Mayda Monzon MD;  Location: [address];  Service: Pulmonary;  Laterality: N/A;    SHOULDER SURGERY  2009 & 2010    right rotator cuff    THORACOTOMY Right 1/2/2024    Procedure: THORACOTOMY;  Surgeon: Tan Thomson MD;  [facility];  Service: Cardiothoracic;  Laterality: Right;    THORACOTOMY, WITH INITIAL THERAPEUTIC WEDGE RESECTION OF LUNG Right 1/2/2024    Procedure: THORACOTOMY, WITH INITIAL THERAPEUTIC WEDGE RESECTION OF LUNG;  Surgeon: Tan Thomson MD;  Location: [facility] OR [address] [place] [address];  Service: Cardiothoracic;  Laterality: Right;    TONSILLECTOMY      UPPER GASTROINTESTINAL ENDOSCOPY  04/2018       Review of patient's allergies indicates:   Allergen Reactions    Crestor [rosuvastatin] Swelling    Gluten protein        Medications:  Facility-Administered Medications Prior to Admission   Medication    capsaicin-skin cleanser patch 1 patch    capsaicin-skin cleanser patch 2 patch     Medications Prior to Admission   Medication Sig    atorvastatin (LIPITOR) 40 MG tablet Take 1 tablet (40 mg total) by mouth once daily. (Patient taking differently: Take 40 mg by mouth every evening.)    epoetin noble-epbx (RETACRIT) 10,000 unit/mL imjection Inject 1 mL (10,000 Units total) into the skin every Mon, Wed, Fri. HOLD IF HEMOGLOBIN is 10 or GREATER    DO NOT SHAKE  DO NOT DILUTE  DO NOT MIX with other drug solutions    ferrous gluconate (FERGON) 324 MG tablet Take 1 tablet (324 mg total) by mouth daily with breakfast.    meclizine (ANTIVERT) 25 mg tablet Take 1 tablet (25 mg total) by mouth 3 (three) times daily as needed for Dizziness.    melatonin (MELATIN) 3 mg tablet Take 3 tablets (9 mg total) by mouth nightly as needed  "for Insomnia.    midodrine (PROAMATINE) 5 MG Tab Take 1 tablet (5 mg total) by mouth 2 (two) times daily as needed (if BP is systolic below 90 mmHg and patient is symptomatic. Also if BP is below 90 mmHg during dialysis).    pantoprazole (PROTONIX) 40 MG tablet Take 1 tablet (40 mg total) by mouth once daily.    patiromer calcium sorbitex (VELTASSA) 8.4 gram PwPk Take 2 packets (16.8 g total) by mouth once daily.    sevelamer HCL (RENAGEL) 800 MG Tab Take 2 tablets (1,600 mg total) by mouth 3 (three) times daily with meals.    torsemide 40 mg Tab Take 40 mg by mouth once daily.    B complex-vitamin C-folic acid (MELLISSA-BENJY) 0.8 mg Tab Take 1 tablet (0.8 mg total) by mouth once daily.    betahistine HCl (BETAHISTINE, BULK, MISC)     blood sugar diagnostic (TRUE METRIX GLUCOSE TEST STRIP) Strp USE ONE STRIP FOR TESTING 2 TIMES A DAY    blood-glucose meter kit Use to test twice a day    calcium-vitamin D3 (OS-CIPRIANO 500 + D3) 500 mg-5 mcg (200 unit) per tablet Take 1 tablet by mouth once daily.    coenzyme Q10 100 mg capsule     dicyclomine (BENTYL) 10 MG capsule     dronabinoL (MARINOL) 5 MG capsule Take 1 capsule (5 mg total) by mouth 2 (two) times daily before meals.    econazole nitrate 1 % cream Apply topically 2 (two) times daily.    insulin aspart U-100 (NOVOLOG) 100 unit/mL (3 mL) InPn pen Inject before meals:  150-200=+1, 201-250=+2, 251-300=+3; 301-350=+4, over 350=+5 units    lancets Misc 1 lancet by Misc.(Non-Drug; Combo Route) route 4 (four) times daily.    mometasone (ELOCON) 0.1 % ointment Apply topically.    neomycin (MYCIFRADIN) 500 mg Tab     nutritional supplements Liqd Take 237 mLs by mouth 4 (four) times daily.    oxyCODONE (ROXICODONE) 5 MG immediate release tablet Take 5 mg by mouth every 4 (four) hours as needed.    pen needle, diabetic (BD ULTRA-FINE MARIS PEN NEEDLE) 32 gauge x 5/32" Ndle 1 Device by Misc.(Non-Drug; Combo Route) route 4 (four) times daily with meals and nightly.    pregabalin " (LYRICA) 150 MG capsule TAKE ONE CAPSULE BY MOUTH 2 TIMES A DAY    pregabalin (LYRICA) 75 MG capsule Take 1 capsule (75 mg total) by mouth Daily. Give after HD    simethicone (MYLICON) 125 mg Cap capsule Take by mouth.    vit C,P-Dj-qvwfx-lutein-zeaxan (PRESERVISION AREDS 2) 147-373-75-1 mg-unit-mg-mg Cap     vitamins  A,C,E-zinc-copper 14,320-226-200 unit-mg-unit Cap Take 1 tablet by mouth once daily.     Antibiotics (From admission, onward)      Start     Stop Route Frequency Ordered    01/15/24 2300  meropenem (MERREM) 2 g in sodium chloride 0.9% 100 mL IVPB         -- IV Every 12 hours (non-standard times) 01/15/24 1421          Antifungals (From admission, onward)      None          Antivirals (From admission, onward)      None             Immunization History   Administered Date(s) Administered    COVID-19, MRNA, LN-S, PF (Pfizer) (Purple Cap) 02/13/2021, 03/06/2021    Influenza 11/30/2012    Influenza (FLUAD) - Quadrivalent - Adjuvanted - PF *Preferred* (65+) 10/03/2020    Influenza Split 10/15/2019    Pneumococcal Conjugate - 13 Valent 04/27/2018    Pneumococcal Polysaccharide - 23 Valent 10/14/2019       Family History       Problem Relation (Age of Onset)    Arthritis Father    Breast cancer Mother, Paternal Aunt    Cancer Mother, Paternal Aunt    Diabetes Paternal Grandfather    Hyperlipidemia Sister    Vision loss Father, Mother, Sister          Social History     Socioeconomic History    Marital status:    Tobacco Use    Smoking status: Never    Smokeless tobacco: Never   Substance and Sexual Activity    Alcohol use: No    Drug use: Never    Sexual activity: Not Currently     Partners: Male     Birth control/protection: See Surgical Hx     Comment: :      Social Determinants of Health     Financial Resource Strain: Low Risk  (11/22/2023)    Overall Financial Resource Strain (CARDIA)     Difficulty of Paying Living Expenses: Not hard at all   Food Insecurity: No Food Insecurity (11/22/2023)     Hunger Vital Sign     Worried About Running Out of Food in the Last Year: Never true     Ran Out of Food in the Last Year: Never true   Transportation Needs: No Transportation Needs (11/22/2023)    PRAPARE - Transportation     Lack of Transportation (Medical): No     Lack of Transportation (Non-Medical): No   Physical Activity: Insufficiently Active (11/22/2023)    Exercise Vital Sign     Days of Exercise per Week: 4 days     Minutes of Exercise per Session: 10 min   Stress: Stress Concern Present (11/22/2023)    [employer] - Occupational Stress Questionnaire     Feeling of Stress : To some extent   Social Connections: Unknown (11/22/2023)    Social Connection and Isolation Panel [NHANES]     Frequency of Communication with Friends and Family: More than three times a week     Frequency of Social Gatherings with Friends and Family: More than three times a week     Active Member of Clubs or Organizations: Yes     Attends Club or Organization Meetings: More than 4 times per year     Marital Status:    Housing Stability: Low Risk  (11/22/2023)    Housing Stability Vital Sign     Unable to Pay for Housing in the Last Year: No     Number of Places Lived in the Last Year: 1     Unstable Housing in the Last Year: No     Review of Systems   Constitutional:  Positive for fatigue. Negative for chills and fever.   HENT:  Negative for congestion and trouble swallowing.    Eyes:  Negative for visual disturbance.   Respiratory:  Positive for cough (improving) and shortness of breath (intermittent).    Cardiovascular:  Negative for chest pain and leg swelling.   Gastrointestinal:  Negative for abdominal distention, abdominal pain, diarrhea and nausea.   Genitourinary:  Negative for dysuria.   Musculoskeletal:  Negative for arthralgias and back pain.   Neurological:  Negative for headaches.     Objective:     Vital Signs (Most Recent):  Temp: 97.4 °F (36.3 °C) (01/15/24 1515)  Pulse: 82  (01/15/24 1515)  Resp: (!) 22 (01/15/24 1515)  BP: 123/76 (01/15/24 1101)  SpO2: (!) 92 % (01/15/24 1515) Vital Signs (24h Range):  Temp:  [97.4 °F (36.3 °C)-97.6 °F (36.4 °C)] 97.4 °F (36.3 °C)  Pulse:  [] 82  Resp:  [20-41] 22  SpO2:  [88 %-98 %] 92 %  BP: ()/(58-85) 123/76  Arterial Line BP: (102-178)/(50-78) 130/55     Weight: 68.9 kg (152 lb)  Body mass index is 25.29 kg/m².    Estimated Creatinine Clearance: 24.4 mL/min (A) (based on SCr of 2 mg/dL (H)).     Physical Exam  Vitals reviewed.   Constitutional:       Appearance: She is ill-appearing.   HENT:      Head: Normocephalic and atraumatic.   Cardiovascular:      Rate and Rhythm: Normal rate and regular rhythm.   Pulmonary:      Effort: Pulmonary effort is normal.      Breath sounds: Normal breath sounds.      Comments: Nc in place  Abdominal:      General: There is no distension.      Palpations: Abdomen is soft.      Tenderness: There is no abdominal tenderness.   Musculoskeletal:         General: Normal range of motion.      Right lower leg: No edema.      Left lower leg: No edema.      Comments: R sided tunneled catheter- no erythema  R groin temp pacemaker access- no erythema or drainage   Neurological:      General: No focal deficit present.      Mental Status: She is alert and oriented to person, place, and time.   Psychiatric:         Mood and Affect: Mood normal.         Behavior: Behavior normal.          Significant Labs: Blood Culture:   Recent Labs   Lab 01/11/24  1601 01/12/24  0411   LABBLOO Gram stain shahriar bottle: Gram positive cocci in clusters resembling Staph  Results called to and read back by: Gena UNGER RN 01/12/2024  18:43  COAGULASE-NEGATIVE STAPHYLOCOCCUS SPECIES  Organism is a probable contaminant  * No Growth to date  No Growth to date  No Growth to date  No Growth to date       Significant Imaging: I have reviewed all pertinent imaging results/findings within the past 24 hours.      Critical care time spent on  the evaluation and treatment of severe organ dysfunction, review of pertinent labs and imaging studies, discussions with consulting providers and discussions with patient/family: 45 minutes.

## 2024-01-15 NOTE — PROGRESS NOTES
01/14/24 2128   Treatment   Treatment Type SLED   Treatment Status Restart   Dialysis Machine Number [id number]   Dialyzer Time (hours) 0   BVP (Liters) 0 L   Solutions Labeled and Current  Yes   Access Tunneled Cath   Catheter Dressing Intact  Yes   Alarms Engaged Yes   CRRT Comments CRRT RST   $ CRRT Charges   $ CRRT Charges Restart     Report received from primary RN. CRRT started per MD order.

## 2024-01-15 NOTE — HPI
"73 y.o. female never smoker with DDD, benign essential tremor, meniere's disease, HTN, HLD, DM2, GERD, Celiac's, IBS who is found to have RML Carcinoid tumor presented on 1/2 for lymphadenectomy,  robotic-assisted surgery converted to right thoracotomy with therapeutic wedge resection and open mediastinal lymph node dissection. Stepped up to the unit after converting into afib RVR after dialysis 1/5. She was given 3 doses of metoprolol 5, without any response and her heart rate or rhythm.  The oxygen requirements increased to 5 L from room air.      CT chest on 1/13 revealed mediastinal LAD, Moderate-sized bilateral pleural effusions, increased from 01/08/2024; likely multiply-loculated on the right. Underlying pleural infection or pleural neoplasm not excluded.     ID consulted for: "needs meropenem for right sided loculated pleural effusion, possible IR drainage later this week."    Patient seen and evaluated by Infectious Diseases with recommendations for meropenem while awaiting further work up of the right pleural effusion.    Infectious Diseases re-consulted on 1/16 for "EP needs ok for pacemaker previous to pleural effusion drainage"      "

## 2024-01-15 NOTE — ASSESSMENT & PLAN NOTE
"73 y.o F with DM2, GERD, Celiac's who is found to have RML Carcinoid tumor presented on 1/2 for lymphadenectomy,  robotic-assisted surgery converted to right thoracotomy with therapeutic wedge resection and open mediastinal lymph node dissection. Stepped up to the unit after converting into afib RVR after dialysis 1/5. Hospital course also c/b ileus s/p NGT, tachy-eulogio syndrome requiring chest compressions and now s/p temporary pacemaker placement 1/12. CT chest on 1/13 revealed mediastinal LAD, "Moderate-sized bilateral pleural effusions, increased from 01/08/2024; likely multiply-loculated on the right. Underlying pleural infection or pleural neoplasm not excluded."     ID consulted for: "needs meropenem for right sided loculated pleural effusion, possible IR drainage later this week." WBC uptrending despite being on pip-tazo since 1/11.unclear if due to inflammatory response to recent procedures vs infection. BCX 1/11 grew CONS likely contaminant. Repeat on 1/12 is ngtd. RCx 1/12 grew e coli and klebsiella.     Recommendations:  --ok to broaden to meropenem pending workup of R effusion  --ICU planning on diagnostic thora later this week- send for cell count, protein, ldh, aerobic/ anaerobic/ fungal/ afb cultures, cytology  --reconsult ID with thora results for final recs.    "

## 2024-01-15 NOTE — ASSESSMENT & PLAN NOTE
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 Stepped up to the ICU 1/5 with afib RVR refractory to IV metoprolol, started on amio gtt with conversion.     - patient seen and examined this morning   - TVP back up pacing at 40; intrinsic rate of 80-110s   - tolerating CRRT-- goal net negative   - CT chest 1/13 w/ bilateral pleural effusion (R>L), pericardial effusion, atelectasis with some consolidation   - Rising leukocytosis on zosyn, discuss broadening coverage  - Infectious work up 2/2 leukocytosis. She remains afebrile. Zosyn (1/11-)   - Bcx 1/11: Staph, probable contaminant    - Bcx1/12: NGTD    - Rapid blood PCR: staph epi  - Sputum Cx 1/11: klebsiella  - Pulmonary toilet with IS, acapella, CPT  - Continue renal diet, BID novasource, dronabinol, reglan. Consider restarting bowel regimen.   - Nephrology following, appreciate assistance. CRRT overnight 1/14.    - daily labs, serial RFP while on CRRT  - IR consultation for image guided thoracentesis/drain insertion; would favor right side first   - Appreciate SICU assistance

## 2024-01-15 NOTE — SUBJECTIVE & OBJECTIVE
Past Medical History:   Diagnosis Date    Anxiety     Celiac disease 2018    Celiac disease     Depression     Diabetes mellitus     Family history of breast cancer in mother      at age 68    Hyperlipidemia     Hypertension     Meniere disease     Meniere's disease, unspecified ear     Menopause     Peptic ulcer     Reflux esophagitis     Urinary tract infection     Vaginal infection     Vaginal Pap smear 2014    Pap/Hpv Negative        Past Surgical History:   Procedure Laterality Date    APPENDECTOMY      BREAST SURGERY      Tags    CARPAL TUNNEL RELEASE Bilateral 2017    ,     CLOSURE, COLOSTOMY Left 2023    Procedure: CLOSURE, COLOSTOMY;  Surgeon: Manuel Javier MD;  Location: [facility] OR;  Service: General;  Laterality: Left;    COLONOSCOPY  2018    Normal  ( Juan A)     COLOSTOMY Right 2023    Procedure: CREATION, COLOSTOMY;  Surgeon: Manuel Javier MD;  Location: [facility] OR;  Service: General;  Laterality: Right;    DILATION AND CURETTAGE OF UTERUS  1986    DUPUYTREN CONTRACTURE RELEASE Bilateral 2017    HYSTERECTOMY      BEAU w/ appy, no BSO     INJECTION OF NEUROLYTIC AGENT AROUND LUMBAR SYMPATHETIC NERVE N/A 2022    Procedure: BLOCK, LUMBAR SYMPATHETIC;  Surgeon: Souleymane Rizo Jr., MD;  Location: [facility] PAIN MGT;  Service: Pain Management;  Laterality: N/A;  no pacemaker   pt is diabetic     INJECTION OF NEUROLYTIC AGENT AROUND LUMBAR SYMPATHETIC NERVE N/A 2022    Procedure: BLOCK, LUMBAR SYMPATHETIC;  Surgeon: Souleymane Rizo Jr., MD;  Location: [facility] PAIN MGT;  Service: Pain Management;  Laterality: N/A;  diabetic     INSERTION OF TUNNELED CENTRAL VENOUS HEMODIALYSIS CATHETER Right 2023    Procedure: INSERTION, CATHETER, HEMODIALYSIS, DUAL LUMEN;  Surgeon: Manuel Javier MD;  Location: [facility] OR;  Service: General;  Laterality: Right;    INSERTION, CATHETER, TUNNELED Left 2023    Procedure: Insertion,catheter,tunneled;  Surgeon: Watson  CHANTAL Fontenot MD;  Location: [facility] OR;  Service: General;  Laterality: Left;    LAPAROTOMY, EXPLORATORY N/A 1/14/2023    Procedure: LAPAROTOMY, EXPLORATORY;  Surgeon: Manuel Javier MD;  Location: [facility] OR;  Service: General;  Laterality: N/A;    LAPAROTOMY, EXPLORATORY N/A 1/16/2023    Procedure: LAPAROTOMY, EXPLORATORY;  Surgeon: Manuel Javier MD;  Location: [facility] OR;  Service: General;  Laterality: N/A;    LYMPHADENECTOMY Right 1/2/2024    Procedure: LYMPHADENECTOMY;  Surgeon: Tan Thomson MD;  Location: [facility] OR 2ND FLR;  Service: Cardiothoracic;  Laterality: Right;    REMOVAL OF CATHETER Right 1/28/2023    Procedure: REMOVAL, CATHETER;  Surgeon: Watson Fontenot MD;  Location: [facility] OR;  Service: General;  Laterality: Right;    REMOVAL, TUNNELED CATH Right 1/25/2023    Procedure: REMOVAL, TUNNELED CATH;  Surgeon: Manuel Javier MD;  Location: [facility] OR;  Service: General;  Laterality: Right;    ROBOTIC BRONCHOSCOPY N/A 10/20/2023    Procedure: ROBOTIC BRONCHOSCOPY;  Surgeon: Mayda Monzon MD;  Location: [facility] OR 2ND FLR;  Service: Pulmonary;  Laterality: N/A;    SHOULDER SURGERY  2009 & 2010    right rotator cuff    THORACOTOMY Right 1/2/2024    Procedure: THORACOTOMY;  Surgeon: Tan Thomson MD;  Location: [facility] OR 2ND FLR;  Service: Cardiothoracic;  Laterality: Right;    THORACOTOMY, WITH INITIAL THERAPEUTIC WEDGE RESECTION OF LUNG Right 1/2/2024    Procedure: THORACOTOMY, WITH INITIAL THERAPEUTIC WEDGE RESECTION OF LUNG;  Surgeon: Tan Thomson MD;  Location: [facility] OR 2ND FLR;  Service: Cardiothoracic;  Laterality: Right;    TONSILLECTOMY      UPPER GASTROINTESTINAL ENDOSCOPY  04/2018       Review of patient's allergies indicates:   Allergen Reactions    Crestor [rosuvastatin] Swelling    Gluten protein        Medications:  Facility-Administered Medications Prior to Admission   Medication    capsaicin-skin cleanser patch 1 patch    capsaicin-skin cleanser patch 2 patch     Medications Prior  to Admission   Medication Sig    atorvastatin (LIPITOR) 40 MG tablet Take 1 tablet (40 mg total) by mouth once daily. (Patient taking differently: Take 40 mg by mouth every evening.)    epoetin noble-epbx (RETACRIT) 10,000 unit/mL imjection Inject 1 mL (10,000 Units total) into the skin every Mon, Wed, Fri. HOLD IF HEMOGLOBIN is 10 or GREATER    DO NOT SHAKE  DO NOT DILUTE  DO NOT MIX with other drug solutions    ferrous gluconate (FERGON) 324 MG tablet Take 1 tablet (324 mg total) by mouth daily with breakfast.    meclizine (ANTIVERT) 25 mg tablet Take 1 tablet (25 mg total) by mouth 3 (three) times daily as needed for Dizziness.    melatonin (MELATIN) 3 mg tablet Take 3 tablets (9 mg total) by mouth nightly as needed for Insomnia.    midodrine (PROAMATINE) 5 MG Tab Take 1 tablet (5 mg total) by mouth 2 (two) times daily as needed (if BP is systolic below 90 mmHg and patient is symptomatic. Also if BP is below 90 mmHg during dialysis).    pantoprazole (PROTONIX) 40 MG tablet Take 1 tablet (40 mg total) by mouth once daily.    patiromer calcium sorbitex (VELTASSA) 8.4 gram PwPk Take 2 packets (16.8 g total) by mouth once daily.    sevelamer HCL (RENAGEL) 800 MG Tab Take 2 tablets (1,600 mg total) by mouth 3 (three) times daily with meals.    torsemide 40 mg Tab Take 40 mg by mouth once daily.    B complex-vitamin C-folic acid (MELLISSA-BENJY) 0.8 mg Tab Take 1 tablet (0.8 mg total) by mouth once daily.    betahistine HCl (BETAHISTINE, BULK, MISC)     blood sugar diagnostic (TRUE METRIX GLUCOSE TEST STRIP) Strp USE ONE STRIP FOR TESTING 2 TIMES A DAY    blood-glucose meter kit Use to test twice a day    calcium-vitamin D3 (OS-CIPRIANO 500 + D3) 500 mg-5 mcg (200 unit) per tablet Take 1 tablet by mouth once daily.    coenzyme Q10 100 mg capsule     dicyclomine (BENTYL) 10 MG capsule     dronabinoL (MARINOL) 5 MG capsule Take 1 capsule (5 mg total) by mouth 2 (two) times daily before meals.    econazole nitrate 1 % cream Apply  "topically 2 (two) times daily.    insulin aspart U-100 (NOVOLOG) 100 unit/mL (3 mL) InPn pen Inject before meals:  150-200=+1, 201-250=+2, 251-300=+3; 301-350=+4, over 350=+5 units    lancets Misc 1 lancet by Misc.(Non-Drug; Combo Route) route 4 (four) times daily.    mometasone (ELOCON) 0.1 % ointment Apply topically.    neomycin (MYCIFRADIN) 500 mg Tab     nutritional supplements Liqd Take 237 mLs by mouth 4 (four) times daily.    oxyCODONE (ROXICODONE) 5 MG immediate release tablet Take 5 mg by mouth every 4 (four) hours as needed.    pen needle, diabetic (BD ULTRA-FINE MARIS PEN NEEDLE) 32 gauge x 5/32" Ndle 1 Device by Misc.(Non-Drug; Combo Route) route 4 (four) times daily with meals and nightly.    pregabalin (LYRICA) 150 MG capsule TAKE ONE CAPSULE BY MOUTH 2 TIMES A DAY    pregabalin (LYRICA) 75 MG capsule Take 1 capsule (75 mg total) by mouth Daily. Give after HD    simethicone (MYLICON) 125 mg Cap capsule Take by mouth.    vit C,N-Jk-lsohi-lutein-zeaxan (PRESERVISION AREDS 2) 829-573-85-1 mg-unit-mg-mg Cap     vitamins  A,C,E-zinc-copper [phone] unit-mg-unit Cap Take 1 tablet by mouth once daily.     Antibiotics (From admission, onward)      Start     Stop Route Frequency Ordered    01/15/24 2300  meropenem (MERREM) 2 g in sodium chloride 0.9% 100 mL IVPB         -- IV Every 12 hours (non-standard times) 01/15/24 1421          Antifungals (From admission, onward)      None          Antivirals (From admission, onward)      None             Immunization History   Administered Date(s) Administered    COVID-19, MRNA, LN-S, PF (Pfizer) (Purple Cap) 02/13/2021, 03/06/2021    Influenza 11/30/2012    Influenza (FLUAD) - Quadrivalent - Adjuvanted - PF *Preferred* (65+) 10/03/2020    Influenza Split 10/15/2019    Pneumococcal Conjugate - 13 Valent 04/27/2018    Pneumococcal Polysaccharide - 23 Valent 10/14/2019       Family History       Problem Relation (Age of Onset)    Arthritis Father    Breast cancer " Mother, Paternal Aunt    Cancer Mother, Paternal Aunt    Diabetes Paternal Grandfather    Hyperlipidemia Sister    Vision loss Father, Mother, Sister          Social History     Socioeconomic History    Marital status:    Tobacco Use    Smoking status: Never    Smokeless tobacco: Never   Substance and Sexual Activity    Alcohol use: No    Drug use: Never    Sexual activity: Not Currently     Partners: Male     Birth control/protection: See Surgical Hx     Comment: :      Social Determinants of Health     Financial Resource Strain: Low Risk  (11/22/2023)    Overall Financial Resource Strain (CARDIA)     Difficulty of Paying Living Expenses: Not hard at all   Food Insecurity: No Food Insecurity (11/22/2023)    Hunger Vital Sign     Worried About Running Out of Food in the Last Year: Never true     Ran Out of Food in the Last Year: Never true   Transportation Needs: No Transportation Needs (11/22/2023)    PRAPARE - Transportation     Lack of Transportation (Medical): No     Lack of Transportation (Non-Medical): No   Physical Activity: Insufficiently Active (11/22/2023)    Exercise Vital Sign     Days of Exercise per Week: 4 days     Minutes of Exercise per Session: 10 min   Stress: Stress Concern Present (11/22/2023)    [employer] [employer] - Occupational Stress Questionnaire     Feeling of Stress : To some extent   Social Connections: Unknown (11/22/2023)    Social Connection and Isolation Panel [NHANES]     Frequency of Communication with Friends and Family: More than three times a week     Frequency of Social Gatherings with Friends and Family: More than three times a week     Active Member of Clubs or Organizations: Yes     Attends Club or Organization Meetings: More than 4 times per year     Marital Status:    Housing Stability: Low Risk  (11/22/2023)    Housing Stability Vital Sign     Unable to Pay for Housing in the Last Year: No     Number of Places Lived in the Last  Year: 1     Unstable Housing in the Last Year: No     Review of Systems   Constitutional:  Positive for fatigue. Negative for chills and fever.   HENT:  Negative for congestion and trouble swallowing.    Eyes:  Negative for visual disturbance.   Respiratory:  Positive for cough (improving) and shortness of breath (intermittent).    Cardiovascular:  Negative for chest pain and leg swelling.   Gastrointestinal:  Negative for abdominal distention, abdominal pain, diarrhea and nausea.   Genitourinary:  Negative for dysuria.   Musculoskeletal:  Negative for arthralgias and back pain.   Neurological:  Negative for headaches.     Objective:     Vital Signs (Most Recent):  Temp: 97.4 °F (36.3 °C) (01/15/24 1515)  Pulse: 82 (01/15/24 1515)  Resp: (!) 22 (01/15/24 1515)  BP: 123/76 (01/15/24 1101)  SpO2: (!) 92 % (01/15/24 1515) Vital Signs (24h Range):  Temp:  [97.4 °F (36.3 °C)-97.6 °F (36.4 °C)] 97.4 °F (36.3 °C)  Pulse:  [] 82  Resp:  [20-41] 22  SpO2:  [88 %-98 %] 92 %  BP: ()/(58-85) 123/76  Arterial Line BP: (102-178)/(50-78) 130/55     Weight: 68.9 kg (152 lb)  Body mass index is 25.29 kg/m².    Estimated Creatinine Clearance: 24.4 mL/min (A) (based on SCr of 2 mg/dL (H)).     Physical Exam  Vitals reviewed.   Constitutional:       Appearance: She is ill-appearing.   HENT:      Head: Normocephalic and atraumatic.   Cardiovascular:      Rate and Rhythm: Normal rate and regular rhythm.   Pulmonary:      Effort: Pulmonary effort is normal.      Breath sounds: Normal breath sounds.      Comments: Nc in place  Abdominal:      General: There is no distension.      Palpations: Abdomen is soft.      Tenderness: There is no abdominal tenderness.   Musculoskeletal:         General: Normal range of motion.      Right lower leg: No edema.      Left lower leg: No edema.      Comments: R sided tunneled catheter- no erythema  R groin temp pacemaker access- no erythema or drainage   Neurological:      General: No focal  deficit present.      Mental Status: She is alert and oriented to person, place, and time.   Psychiatric:         Mood and Affect: Mood normal.         Behavior: Behavior normal.          Significant Labs: Blood Culture:   Recent Labs   Lab 01/11/24  1601 01/12/24  0411   LABBLOO Gram stain shahriar bottle: Gram positive cocci in clusters resembling Staph  Results called to and read back by: Gena UNGER RN 01/12/2024  18:43  COAGULASE-NEGATIVE STAPHYLOCOCCUS SPECIES  Organism is a probable contaminant  * No Growth to date  No Growth to date  No Growth to date  No Growth to date       Significant Imaging: I have reviewed all pertinent imaging results/findings within the past 24 hours.

## 2024-01-15 NOTE — SUBJECTIVE & OBJECTIVE
Interval History/Significant Events: POD 13. Patient still tachy eulogio requiring intermittent pacing with backup TVP set to 40. Had RVR yesterday to 180's s/p 3 x IV metoprolol with only mild improvement. Later she spontaneously converted to NSR. PO intake somewhat improved since advancing and adding back her home appetite stimulant. At midnight will be NPO and heparin gtt off for PPM tmrw.    Follow-up For: Procedure(s) (LRB):  Pacemaker, Temporary, Transvenous (Right)    Post-Operative Day: 3 Days Post-Op    Objective:     Vital Signs (Most Recent):  Temp: 97.4 °F (36.3 °C) (01/15/24 0300)  Pulse: 77 (01/15/24 0715)  Resp: (!) 34 (01/15/24 0715)  BP: 119/85 (01/15/24 0600)  SpO2: 97 % (01/15/24 0340) Vital Signs (24h Range):  Temp:  [95.7 °F (35.4 °C)-97.5 °F (36.4 °C)] 97.4 °F (36.3 °C)  Pulse:  [] 77  Resp:  [18-41] 34  SpO2:  [94 %-97 %] 97 %  BP: ()/(58-85) 119/85  Arterial Line BP: ()/(55-77) 162/73     Weight: 68.9 kg (152 lb)  Body mass index is 25.29 kg/m².      Intake/Output Summary (Last 24 hours) at 1/15/2024 0740  Last data filed at 1/15/2024 0701  Gross per 24 hour   Intake 3543.75 ml   Output 4323 ml   Net -779.25 ml          Physical Exam  Vitals and nursing note reviewed.   Constitutional:       General: She is not in acute distress.     Appearance: Normal appearance. She is normal weight. She is ill-appearing. She is not toxic-appearing.   HENT:      Head: Normocephalic and atraumatic.      Right Ear: External ear normal.      Left Ear: External ear normal.      Nose: Nose normal.      Comments: Nasal cannula in place  Eyes:      General: No scleral icterus.        Right eye: No discharge.         Left eye: No discharge.      Extraocular Movements: Extraocular movements intact.      Conjunctiva/sclera: Conjunctivae normal.   Cardiovascular:      Rate and Rhythm: Normal rate and regular rhythm.   Pulmonary:      Effort: Pulmonary effort is normal. No respiratory distress.    Abdominal:      General: Abdomen is flat. There is no distension.      Palpations: Abdomen is soft. There is no mass.      Tenderness: There is no abdominal tenderness. There is no guarding.      Hernia: No hernia is present.   Genitourinary:     Comments: TVP wire in place  Musculoskeletal:         General: Normal range of motion.      Cervical back: Normal range of motion and neck supple.   Skin:     General: Skin is warm and dry.   Neurological:      General: No focal deficit present.      Mental Status: She is alert and oriented to person, place, and time. Mental status is at baseline.   Psychiatric:         Mood and Affect: Mood normal.         Behavior: Behavior normal.         Thought Content: Thought content normal.            Vents:  Oxygen Concentration (%): 24 (01/15/24 0200)    Lines/Drains/Airways       Central Venous Catheter Line  Duration                  Hemodialysis Catheter 01/25/23 1501 right internal jugular 354 days    Introducer 01/12/24 1100 Femoral Vein Right;Femoral Right 2 days              Drain  Duration             Female External Urinary Catheter w/ Suction 01/13/24 0701 2 days              Arterial Line  Duration             Arterial Line 01/08/24 1324 Right Radial 6 days              Line  Duration                  Pacer Wires 01/12/24 1032 2 days              Peripheral Intravenous Line  Duration                  Hemodialysis AV Fistula 09/01/23 0800 Left forearm 136 days         Peripheral IV - Single Lumen 01/08/24 1108 22 G Posterior;Right Wrist 6 days         Peripheral IV - Single Lumen 01/08/24 1357 20 G Posterior;Right Forearm 6 days         Peripheral IV - Single Lumen 01/12/24 1415 20 G;1 3/4 in Right Forearm 2 days                    Significant Labs:    CBC/Anemia Profile:  Recent Labs   Lab 01/14/24  0223 01/15/24  0201   WBC 25.47* 26.79*   HGB 8.8* 8.4*   HCT 25.0* 24.5*    329   MCV 81* 83   RDW 18.5* 18.9*        Chemistries:  Recent Labs   Lab  01/14/24  0223 01/14/24  1308 01/14/24  1646 01/14/24  2059 01/15/24  0201     139   < > 142 138  138 140   K 4.5  4.4   < > 5.0 4.5  4.5 4.5     106   < > 107 103  103 113*   CO2 22*  22*   < > 17* 21*  21* 17*   BUN 28*  28*   < > 43* 47*  47* 20   CREATININE 2.3*  2.4*   < > 3.5* 3.3*  3.3* 1.6*   CALCIUM 9.0  9.0   < > 8.7 8.4*  8.4* 9.0   ALBUMIN 2.3*  2.3*   < > 2.2* 2.2*  2.2* 2.3*   PROT 6.4  --   --   --  6.2   BILITOT 0.7  --   --   --  0.7   ALKPHOS 182*  --   --   --  159*   ALT 5*  --   --   --  5*   AST 21  --   --   --  17   MG 2.2   < > 2.3 2.2  2.2 2.1  2.1   PHOS 3.0  3.0   < > 4.7* 3.9  3.9 2.0*    < > = values in this interval not displayed.       All pertinent labs within the past 24 hours have been reviewed.    Significant Imaging:  I have reviewed all pertinent imaging results/findings within the past 24 hours.

## 2024-01-15 NOTE — PLAN OF CARE
PT is accepted at [facility] pending  orders. CM updated them that discharge is later in the week and we will keep them updated. AVS updated. CM following.    01/15/24 1539   Post-Acute Status   Post-Acute Authorization Home Health   Post-Acute Placement Status Pending payor medical review/second level review

## 2024-01-15 NOTE — PROGRESS NOTES
01/15/24 1000   Treatment   Dialyzer Time (hours) 12.12   BVP (Liters) 71.4 L   CRRT Comments Treatment complete. Rinsed back by primary RN.   CRRT Sodium Chloride   CRRT Sodium Chloride Volume 137.89 mL   CRRT Hourly Documentation   Total UF (Hourly Cleared) (mL) 217

## 2024-01-15 NOTE — PLAN OF CARE
CM sent HH packet out to multiple HH agencies to review for HH acceptance. CM following.    01/15/24 7452   Post-Acute Status   Post-Acute Authorization Home Health   Post-Acute Placement Status Referrals Sent   Patient choice form signed by patient/caregiver List with quality metrics by geographic area provided

## 2024-01-15 NOTE — ASSESSMENT & PLAN NOTE
#Bradycardia    73yoF with ESRD admitted for bronchial tumor removal with post op course complicated by atrial fibrillation. She had difficult to control rates but further post-operatively, her rates improved so was weaned off rate controlling agents. Overnight 1/11-1/12 and morning of 1/12 she had several bradycardic episodes in the setting of atrial fibrillation, recent digoxin use (reportedly renally dosed, but some high levels with ESRD).    In the setting of asystolic events with several short rounds of compressions (no more than 1 round at a time), TVP was attempted. 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     Digifab with successful reversal. Continuing to monitor with TVP in place over the weekend  She continues to have very brief and transient bradycardic episodes, requiring pacing per TVP at a rate of 40bpm    EF 60-65% during this admission  Jan 13, 2024 EKG: Sinus rhythm, QRS 76    Recommendations:  - TVP backup rate 40  - Avoid digoxin  - Tentative plan for PPM 1/16. NPO and hold AC at midnight at the latest  - Will consider Micra given her venous stenosis hx. Will need a venogram in the EP lab  - Okay to use AV pérez blocking agents if needed for RVR, but would stay with short acting IV BB pushes so we can continue to monitor her underlying AV pérez activity  - Continue anticoagulation

## 2024-01-15 NOTE — PROGRESS NOTES
[facility]  Endocrinology  Progress Note    Admit Date: 1/2/2024     Reason for Consult: Management of T2DM, Hyperglycemia     Surgical Procedure and Date: 1/2/24--   LYMPHADENECTOMY (Right)  THORACOTOMY (Right)  THORACOTOMY, WITH INITIAL THERAPEUTIC WEDGE RESECTION OF LUNG (Right)    Diabetes diagnosis year: in her 50s    Home Diabetes Medications:    LDC SSI   Blood sugar 150 to 200, + 1 unit  Blood sugar 201 to 250, + 2 units  Blood sugar 251 to 300, + 3 units  Blood sugar 301 to 350, + 4 units   Blood sugar greater than 350, + 5 units    How often checking glucose at home? >4 x day   BG readings on regimen: 100-120s  Hypoglycemia on the regimen?  denies   Missed doses on regimen?  No    Diabetes Complications include:     Nephropathy     Complicating diabetes co morbidities:   ESRD and Glucocorticoid use       HPI:   Patient is a 73 y.o. female with DDD, benign essential tremor, meniere's disease, HTN, HLD, DM2, GERD, Celiac's, IBS who is found to have RML Carcinoid tumor.  Chest CT 10/18/23 revealed 1.2 x 1.1 cm right middle lobe nodule, previously 1.2 x 1.0 cm in July 2023. Underwent Robotic Bronchoscopy 10/20/23; path: RML nodule positive for well-differentiated neuroendocrine neoplasm, carcinoid tumor. PET/CT Cu64 11/17/23 showed known RML nodule with hypermetabolic activity. Now presents s/p thoracotomy with wedge resection of right lung. Endocrine consulted for BG management.         Interval HPI:   Overnight events: No acute events overnight. Patient in room 28387 CVICU/24674 CVICU A. Blood glucose worsening. BG at and above goal on current insulin regimen (SSI ). BG trending upward. Steroid use- None. 3 Days Post-Op  Renal function- Abnormal - Creatinine 1.6  Vasopressors-  None       Endocrine will continue to follow and manage insulin orders inpatient.         Diet diabetic Soft & Bite Sized (IDDSI Level 6), Gluten Free, Renal; 2000 Calorie; Standard Tray  Diet NPO Except for:  "Sips with Medication     Eatin%  Nausea: No  Hypoglycemia and intervention: No  Fever: No  TPN and/or TF: No      /73 (BP Location: Right arm, Patient Position: Lying)   Pulse (!) 118   Temp 97.6 °F (36.4 °C) (Axillary)   Resp (!) 32   Ht 5' 5" (1.651 m)   Wt 68.9 kg (152 lb)   LMP  (LMP Unknown) Comment: BEAU/ NO BSO  1986  SpO2 96% Comment: ABG Value  Breastfeeding No   BMI 25.29 kg/m²     Labs Reviewed and Include    Recent Labs   Lab 01/15/24  0201   *   CALCIUM 9.0   ALBUMIN 2.3*   PROT 6.2      K 4.5   CO2 17*   *   BUN 20   CREATININE 1.6*   ALKPHOS 159*   ALT 5*   AST 17   BILITOT 0.7     Lab Results   Component Value Date    WBC 26.79 (H) 01/15/2024    HGB 8.4 (L) 01/15/2024    HCT 24.5 (L) 01/15/2024    MCV 83 01/15/2024     01/15/2024     No results for input(s): "TSH", "FREET4" in the last 168 hours.  Lab Results   Component Value Date    HGBA1C 4.9 11/15/2023       Nutritional status:   Body mass index is 25.29 kg/m².  Lab Results   Component Value Date    ALBUMIN 2.3 (L) 01/15/2024    ALBUMIN 2.2 (L) 2024    ALBUMIN 2.2 (L) 2024     No results found for: "PREALBUMIN"    Estimated Creatinine Clearance: 30.6 mL/min (A) (based on SCr of 1.6 mg/dL (H)).    Accu-Checks  Recent Labs     24  0031 24  0533 24  0854 24  1027 24  1608 24  2108 24  0838 24  1055 24  2058 01/15/24  0832   POCTGLUCOSE 218* 193* 154* 209* 189* 173* 181* 197* 323* 217*       Current Medications and/or Treatments Impacting Glycemic Control  Immunotherapy:    Immunosuppressants       None          Steroids:   Hormones (From admission, onward)      None          Pressors:    Autonomic Drugs (From admission, onward)      None          Hyperglycemia/Diabetes Medications:   Antihyperglycemics (From admission, onward)      Start     Stop Route Frequency Ordered    01/15/24 1015  insulin detemir U-100 (Levemir) pen 10 Units      "    -- SubQ Daily 01/15/24 0913    01/11/24 0824  insulin aspart U-100 pen 0-10 Units         -- SubQ Every 4 hours PRN 01/11/24 0724            ASSESSMENT and PLAN    Renal/  ESRD (end stage renal disease)    Titrate insulin slowly to avoid hypoglycemia as the risk of hypoglycemia increases with decreased creatinine clearance.      Oncology  * Malignant carcinoid tumor of bronchus  Managed per primary team        Endocrine  Type 2 diabetes mellitus with diabetic polyneuropathy, without long-term current use of insulin  BG goal: 140-180    - Start Levemir 10 units daily (WBD 0.3 units/kg/day)  - Novolog Mod dose correction with ISF 25 starting at 150 prn ac/hs   - POCT Glucose every 4 hours  - Hypoglycemia protocol in place      ** Please notify Endocrine for any change and/or advance in diet**  ** Please call Endocrine for any BG related issues **     Discharge Planning:   TBD. Please notify endocrinology prior to discharge.             Pranav Perry, DNP, FNP  Endocrinology  [facility]

## 2024-01-15 NOTE — PROGRESS NOTES
[facility]  Cardiac Electrophysiology  Progress Note    Admission Date: 1/2/2024  Code Status: Full Code   Attending Physician: Tan Thomson MD   Expected Discharge Date: 1/19/2024  Principal Problem:Malignant carcinoid tumor of bronchus    Subjective:     Interval History:  Still paroxysmal atrial fibrillation. She has bradycardic episodes (no evidence of conversion pauses) during which her R-R space out, she maintains her underlying atrial rhythm, and begins to pace. Since yesterday she has been sinus into this morning. No clear heart block on telemetry around bradycardic episodes, due to lots of artifact present. These episodes are intermittent. Capture threshold <1mA.    Review of Systems   Constitutional: Negative for diaphoresis and fever.   Cardiovascular:  Negative for chest pain, dyspnea on exertion, leg swelling, near-syncope, orthopnea, palpitations, paroxysmal nocturnal dyspnea and syncope.   Respiratory:  Negative for cough and shortness of breath.    Gastrointestinal:  Negative for abdominal pain, diarrhea, nausea and vomiting.   Neurological:  Negative for light-headedness.   Psychiatric/Behavioral:  Negative for altered mental status and substance abuse.      Objective:     Vital Signs (Most Recent):  Temp: 97.4 °F (36.3 °C) (01/15/24 1515)  Pulse: 79 (01/15/24 1600)  Resp: (!) 26 (01/15/24 1600)  BP: (!) 154/70 (01/15/24 1600)  SpO2: 96 % (01/15/24 1600) Vital Signs (24h Range):  Temp:  [97.4 °F (36.3 °C)-97.6 °F (36.4 °C)] 97.4 °F (36.3 °C)  Pulse:  [] 79  Resp:  [20-43] 26  SpO2:  [86 %-98 %] 96 %  BP: (105-156)/(58-85) 154/70  Arterial Line BP: (102-178)/(50-78) 129/54     Weight: 68.9 kg (152 lb)  Body mass index is 25.29 kg/m².     SpO2: 96 %        Physical Exam  Constitutional:       Appearance: Normal appearance.   Cardiovascular:      Rate and Rhythm: Normal rate and regular rhythm.      Pulses: Normal pulses.      Heart sounds: Normal heart sounds.       Comments: TVP in place  Pulmonary:      Effort: Pulmonary effort is normal.      Breath sounds: Normal breath sounds. No wheezing or rales.   Abdominal:      General: Abdomen is flat. There is no distension.      Palpations: Abdomen is soft.      Tenderness: There is no abdominal tenderness.   Musculoskeletal:      Right lower leg: No edema.      Left lower leg: No edema.   Skin:     General: Skin is warm and dry.   Neurological:      Mental Status: She is alert and oriented to person, place, and time. Mental status is at baseline.   Psychiatric:         Mood and Affect: Mood normal.         Behavior: Behavior normal.            Significant Labs: All pertinent lab results from the last 24 hours have been reviewed.    Significant Imaging:  Reviewed  Assessment and Plan:     Atrial fibrillation  #Bradycardia    73yoF with ESRD admitted for bronchial tumor removal with post op course complicated by atrial fibrillation. She had difficult to control rates but further post-operatively, her rates improved so was weaned off rate controlling agents. Overnight 1/11-1/12 and morning of 1/12 she had several bradycardic episodes in the setting of atrial fibrillation, recent digoxin use (reportedly renally dosed, but some high levels with ESRD).    In the setting of asystolic events with several short rounds of compressions (no more than 1 round at a time), TVP was attempted. 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 She is post PTA and stenting of the left innominate vein. This was done at an outside hospital.    Digifab with successful reversal. Continuing to monitor with TVP in place over the weekend  She continues to have  very brief and transient bradycardic episodes, requiring pacing per TVP at a rate of 40bpm    EF 60-65% during this admission  Jan 13, 2024 EKG: Sinus rhythm, QRS 76    Recommendations:  - TVP backup rate 40  - Avoid digoxin  - Tentative plan for PPM 1/16. NPO and hold AC at midnight at the latest  - Will consider Micra given her venous stenosis hx. Will need a venogram in the EP lab  - Okay to use AV pérez blocking agents if needed for RVR, but would stay with short acting IV BB pushes so we can continue to monitor her underlying AV pérez activity  - Continue anticoagulation        Connor M Gillies, MD  Cardiac Electrophysiology  [facility]

## 2024-01-15 NOTE — PROGRESS NOTES
[facility]  Thoracic Surgery  Progress Note    Subjective:     History of Present Illness:  No notes on file    Post-Op Info:  Procedure(s) (LRB):  Pacemaker, Temporary, Transvenous (Right)   3 Days Post-Op     Interval History: CATHY. Continues to intermittently require pacing and also become tachycardic into 120s, 130s. Metop x 3 IV given yesterday.   Leukocytosis continues uptrending; afebrile. Continues on zosyn.   Ate a little bit of chicken yesterday, BM x 2. No abdominal pain.        Medications:  Continuous Infusions:   sodium chloride 0.9% 200 mL/hr at 01/15/24 0800     Scheduled Meds:   acetylcysteine 100 mg/ml (10%)  4 mL Nebulization TID WAKE    droNABinol  5 mg Oral BID    epoetin noble (PROCRIT) injection  4,000 Units Intravenous Every Mon, Wed, Fri    LIDOcaine  1 patch Transdermal Q24H    metoclopramide  5 mg Intravenous Q6H    oxybutynin  5 mg Oral TID    pantoprazole  40 mg Oral Daily    piperacillin-tazobactam (Zosyn) IV (PEDS and ADULTS) (extended infusion is not appropriate)  4.5 g Intravenous Q12H    sevelamer carbonate  1,600 mg Oral TID WM     PRN Meds:sodium chloride 0.9%, albuterol sulfate, bisacodyL, dextrose 10%, dextrose 10%, glucagon (human recombinant), glucose, glucose, heparin (porcine), hydrALAZINE, insulin aspart U-100, magnesium sulfate IVPB, metoprolol, ondansetron, sodium chloride 0.9%     Review of patient's allergies indicates:   Allergen Reactions    Crestor [rosuvastatin] Swelling    Gluten protein      Objective:     Vital Signs (Most Recent):  Temp: 97.6 °F (36.4 °C) (01/15/24 0800)  Pulse: (!) 117 (01/15/24 0800)  Resp: (!) 34 (01/15/24 0800)  BP: 119/85 (01/15/24 0600)  SpO2:  (unable) (01/15/24 0754) Vital Signs (24h Range):  Temp:  [95.7 °F (35.4 °C)-97.6 °F (36.4 °C)] 97.6 °F (36.4 °C)  Pulse:  [] 117  Resp:  [18-41] 34  SpO2:  [94 %-97 %] 97 %  BP: ()/(58-85) 119/85  Arterial Line BP: ()/(55-78) 134/77     Intake/Output - Last  3 Shifts         01/13 0700  01/14 0659 01/14 0700  01/15 0659 01/15 0700 01/16 0659    P.O.  336     I.V. (mL/kg) 1936.2 (28.1) 3040.7 (44.1) 428 (6.2)    IV Piggyback 199.9 168.7     Total Intake(mL/kg) 2136.1 (31) 3545.3 (51.5) 428 (6.2)    Urine (mL/kg/hr) 50 (0)      Other 2027 4400 720    Stool  0     Total Output 2077 4400 720    Net +59.1 -854.7 -292           Stool Occurrence  2 x             SpO2:  (unable)       Physical Exam  Vitals and nursing note reviewed.   Constitutional:       Appearance: Normal appearance.   HENT:      Head: Normocephalic and atraumatic.      Nose: Nose normal.   Cardiovascular:      Rate and Rhythm: Normal rate and regular rhythm.      Pulses: Normal pulses.   Pulmonary:      Effort: Pulmonary effort is normal. No respiratory distress.      Comments: Right posterior chest incision c/d/I    Right chest permacath  Abdominal:      General: Abdomen is flat. There is no distension.      Palpations: Abdomen is soft.      Tenderness: There is no abdominal tenderness.      Comments: Soft, nontender abdomen   Genitourinary:     Comments: TVP wire in place, right groin  Musculoskeletal:      Right lower leg: Edema present.      Left lower leg: Edema present.   Skin:     General: Skin is warm and dry.      Coloration: Skin is pale.   Neurological:      General: No focal deficit present.      Mental Status: She is alert.            Significant Labs:  CBC:   Recent Labs   Lab 01/15/24  0201   WBC 26.79*   RBC 2.96*   HGB 8.4*   HCT 24.5*      MCV 83   MCH 28.4   MCHC 34.3         Significant Diagnostics:  I have reviewed and interpreted all pertinent imaging results/findings within the past 24 hours.    VTE Risk Mitigation (From admission, onward)           Ordered     heparin (porcine) injection 1,000 Units  As needed (PRN)         01/11/24 1351     heparin (porcine) injection 1,000 Units  As needed (PRN)         01/03/24 1123     IP VTE HIGH RISK PATIENT  Once         01/02/24 1226      Place CARMELITA hose  Until discontinued         01/02/24 1226     Place sequential compression device  Until discontinued         01/02/24 1226                  Assessment/Plan:     * Malignant carcinoid tumor of bronchus  74 yo female with ESRD (TTS), DDD, benign essential tremor, meniere's disease, HTN, HLD, DM2, GERD, Celiac's, IBS and 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 Stepped up to the ICU 1/5 with afib RVR refractory to IV metoprolol, started on amio gtt with conversion.     - patient seen and examined this morning   - TVP back up pacing at 40; intrinsic rate of 80-110s   - tolerating CRRT-- goal net negative   - CT chest 1/13 w/ bilateral pleural effusion (R>L), pericardial effusion, atelectasis with some consolidation   - Rising leukocytosis on zosyn, discuss broadening coverage  - Infectious work up 2/2 leukocytosis. She remains afebrile. Zosyn (1/11-)   - Bcx 1/11: Staph, probable contaminant    - Bcx1/12: NGTD    - Rapid blood PCR: staph epi  - Sputum Cx 1/11: klebsiella  - Pulmonary toilet with IS, acapella, CPT  - Continue renal diet, BID novasource, dronabinol, reglan. Consider restarting bowel regimen.   - Nephrology following, appreciate assistance. CRRT overnight 1/14.    - daily labs, serial RFP while on CRRT  - IR consultation for image guided thoracentesis/drain insertion; would favor right side first   - Appreciate SICU assistance         Kat Flores MD  Thoracic Surgery  [facility]

## 2024-01-15 NOTE — ASSESSMENT & PLAN NOTE
Titrate insulin slowly to avoid hypoglycemia as the risk of hypoglycemia increases with decreased creatinine clearance.     belongings w/ pt at bedside throughout ER stay

## 2024-01-15 NOTE — SUBJECTIVE & OBJECTIVE
Interval History: NAROQUEON. Continues to intermittently require pacing and also become tachycardic into 120s, 130s. Metop x 3 IV given yesterday.   Leukocytosis continues uptrending; afebrile. Continues on zosyn.   Ate a little bit of chicken yesterday, BM x 2. No abdominal pain.        Medications:  Continuous Infusions:   sodium chloride 0.9% 200 mL/hr at 01/15/24 0800     Scheduled Meds:   acetylcysteine 100 mg/ml (10%)  4 mL Nebulization TID WAKE    droNABinol  5 mg Oral BID    epoetin noble (PROCRIT) injection  4,000 Units Intravenous Every Mon, Wed, Fri    LIDOcaine  1 patch Transdermal Q24H    metoclopramide  5 mg Intravenous Q6H    oxybutynin  5 mg Oral TID    pantoprazole  40 mg Oral Daily    piperacillin-tazobactam (Zosyn) IV (PEDS and ADULTS) (extended infusion is not appropriate)  4.5 g Intravenous Q12H    sevelamer carbonate  1,600 mg Oral TID WM     PRN Meds:sodium chloride 0.9%, albuterol sulfate, bisacodyL, dextrose 10%, dextrose 10%, glucagon (human recombinant), glucose, glucose, heparin (porcine), hydrALAZINE, insulin aspart U-100, magnesium sulfate IVPB, metoprolol, ondansetron, sodium chloride 0.9%     Review of patient's allergies indicates:   Allergen Reactions    Crestor [rosuvastatin] Swelling    Gluten protein      Objective:     Vital Signs (Most Recent):  Temp: 97.6 °F (36.4 °C) (01/15/24 0800)  Pulse: (!) 117 (01/15/24 0800)  Resp: (!) 34 (01/15/24 0800)  BP: 119/85 (01/15/24 0600)  SpO2:  (unable) (01/15/24 0754) Vital Signs (24h Range):  Temp:  [95.7 °F (35.4 °C)-97.6 °F (36.4 °C)] 97.6 °F (36.4 °C)  Pulse:  [] 117  Resp:  [18-41] 34  SpO2:  [94 %-97 %] 97 %  BP: ()/(58-85) 119/85  Arterial Line BP: ()/(55-78) 134/77     Intake/Output - Last 3 Shifts         01/13 0700 01/14 0659 01/14 0700  01/15 0659 01/15 0700  01/16 0659    P.O.  336     I.V. (mL/kg) 1936.2 (28.1) 3040.7 (44.1) 428 (6.2)    IV Piggyback 199.9 168.7     Total Intake(mL/kg) 2136.1 (31) 3545.3 (51.5)  428 (6.2)    Urine (mL/kg/hr) 50 (0)      Other 2027 4400 720    Stool  0     Total Output 2077 4400 720    Net +59.1 -854.7 -292           Stool Occurrence  2 x             SpO2:  (unable)        Physical Exam  Vitals and nursing note reviewed.   Constitutional:       Appearance: Normal appearance.   HENT:      Head: Normocephalic and atraumatic.      Nose: Nose normal.   Cardiovascular:      Rate and Rhythm: Normal rate and regular rhythm.      Pulses: Normal pulses.   Pulmonary:      Effort: Pulmonary effort is normal. No respiratory distress.      Comments: Right posterior chest incision c/d/I    Right chest permacath  Abdominal:      General: Abdomen is flat. There is no distension.      Palpations: Abdomen is soft.      Tenderness: There is no abdominal tenderness.      Comments: Soft, nontender abdomen   Genitourinary:     Comments: TVP wire in place, right groin  Musculoskeletal:      Right lower leg: Edema present.      Left lower leg: Edema present.   Skin:     General: Skin is warm and dry.      Coloration: Skin is pale.   Neurological:      General: No focal deficit present.      Mental Status: She is alert.            Significant Labs:  CBC:   Recent Labs   Lab 01/15/24  0201   WBC 26.79*   RBC 2.96*   HGB 8.4*   HCT 24.5*      MCV 83   MCH 28.4   MCHC 34.3         Significant Diagnostics:  I have reviewed and interpreted all pertinent imaging results/findings within the past 24 hours.    VTE Risk Mitigation (From admission, onward)           Ordered     heparin (porcine) injection 1,000 Units  As needed (PRN)         01/11/24 1351     heparin (porcine) injection 1,000 Units  As needed (PRN)         01/03/24 1123     IP VTE HIGH RISK PATIENT  Once         01/02/24 1226     Place CARMELITA hose  Until discontinued         01/02/24 1226     Place sequential compression device  Until discontinued         01/02/24 1226

## 2024-01-15 NOTE — ASSESSMENT & PLAN NOTE
#Bradycardia    73yoF with ESRD admitted for bronchial tumor removal with post op course complicated by atrial fibrillation. She had difficult to control rates but further post-operatively, her rates improved so was weaned off rate controlling agents. Overnight 1/11-1/12 and morning of 1/12 she had several bradycardic episodes in the setting of atrial fibrillation, recent digoxin use (reportedly renally dosed, but some high levels with ESRD).    In the setting of asystolic events with several short rounds of compressions (no more than 1 round at a time), TVP was attempted. 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 She is post PTA and stenting of the left innominate vein. This was done at an outside hospital.    Digifab with successful reversal. Continuing to monitor with TVP in place over the weekend  She continues to have very brief and transient bradycardic episodes, requiring pacing per TVP at a rate of 40bpm    EF 60-65% during this admission  Jan 13, 2024 EKG: Sinus rhythm, QRS 76    Recommendations:  - TVP backup rate 50  - Attempting for Micra today

## 2024-01-15 NOTE — SUBJECTIVE & OBJECTIVE
Interval History:     The patient received SLED/SCUF for the past 12 hours. Will rinse back at 16:00 and restart for 8 hours SLED tonight. Her hemodynamic condition is better. The orders will be adjusted according to the hemodynamic and metabolic parameters.       Review of patient's allergies indicates:   Allergen Reactions    Crestor [rosuvastatin] Swelling    Gluten protein      Current Facility-Administered Medications   Medication Frequency    0.9%  NaCl infusion (CRRT USE ONLY) Continuous    0.9%  NaCl infusion PRN    0.9%  NaCl infusion Continuous    acetylcysteine 100 mg/ml (10%) solution 4 mL TID WAKE    albuterol sulfate nebulizer solution 2.5 mg Q4H PRN    bisacodyL suppository 10 mg Daily PRN    dextrose 10% bolus 125 mL 125 mL PRN    dextrose 10% bolus 250 mL 250 mL PRN    droNABinol capsule 5 mg BID    epoetin noble injection 4,000 Units Every Mon, Wed, Fri    glucagon (human recombinant) injection 1 mg PRN    glucose chewable tablet 16 g PRN    glucose chewable tablet 24 g PRN    heparin (porcine) injection 1,000 Units PRN    heparin 25,000 units in dextrose 5% 250 mL (100 units/mL) infusion LOW INTENSITY nomogram - [facility] Continuous    hydrALAZINE injection 10 mg Q6H PRN    insulin aspart U-100 pen 0-10 Units Q4H PRN    insulin detemir U-100 (Levemir) pen 10 Units Daily    LIDOcaine 5 % patch 1 patch Q24H    magnesium sulfate 2g in water 50mL IVPB (premix) PRN    meropenem (MERREM) 2 g in sodium chloride 0.9% 100 mL IVPB Q12H    metoclopramide injection 5 mg Q6H    metoprolol injection 5 mg Q5 Min PRN    ondansetron injection 8 mg Q8H PRN    oxybutynin tablet 5 mg TID    pantoprazole EC tablet 40 mg Daily    polyethylene glycol packet 17 g Daily    senna-docusate 8.6-50 mg per tablet 1 tablet Daily    sodium chloride 0.9% flush 10 mL PRN       Objective:     Vital Signs (Most Recent):  Temp: 97.4 °F (36.3 °C) (01/15/24 1515)  Pulse: 79 (01/15/24 1600)  Resp: (!) 26 (01/15/24 1600)  BP: (!) 154/70  "(01/15/24 1600)  SpO2: 96 % (01/15/24 1600) Vital Signs (24h Range):  Temp:  [97.4 °F (36.3 °C)-97.6 °F (36.4 °C)] 97.4 °F (36.3 °C)  Pulse:  [] 79  Resp:  [20-43] 26  SpO2:  [86 %-98 %] 96 %  BP: (105-156)/(58-85) 154/70  Arterial Line BP: (102-178)/(50-78) 129/54     Weight: 68.9 kg (152 lb) (01/12/24 0900)  Body mass index is 25.29 kg/m².  Body surface area is 1.78 meters squared.    I/O last 3 completed shifts:  In: 5342.9 [P.O.:336; I.V.:4738.3; IV Piggyback:268.6]  Out: 6427 [Other:6427]     Physical Exam  HENT:      Head: Normocephalic.   Cardiovascular:      Rate and Rhythm: Normal rate.      Pulses: Normal pulses.   Musculoskeletal:      Cervical back: Normal range of motion.   Neurological:      Mental Status: She is alert.          Significant Labs:  ABGs:   Recent Labs   Lab 01/15/24  0835   PH 7.379   PCO2 27.6*   HCO3 16.3*   POCSATURATED 96   BE -9*     BMP:   Recent Labs   Lab 01/15/24  1430   *      K 4.7   *   CO2 18*   BUN 25*   CREATININE 2.0*   CALCIUM 8.9   MG 2.0     Cardiac Markers: No results for input(s): "CKMB", "TROPONINT", "MYOGLOBIN" in the last 168 hours.  CBC:   Recent Labs   Lab 01/15/24  0201   WBC 26.79*   RBC 2.96*   HGB 8.4*   HCT 24.5*      MCV 83   MCH 28.4   MCHC 34.3     CMP:   Recent Labs   Lab 01/15/24  0201 01/15/24  1430   * 216*   CALCIUM 9.0 8.9   ALBUMIN 2.3* 2.3*   PROT 6.2  --     140   K 4.5 4.7   CO2 17* 18*   * 114*   BUN 20 25*   CREATININE 1.6* 2.0*   ALKPHOS 159*  --    ALT 5*  --    AST 17  --    BILITOT 0.7  --      LFTs:   Recent Labs   Lab 01/15/24  0201 01/15/24  1430   ALT 5*  --    AST 17  --    ALKPHOS 159*  --    BILITOT 0.7  --    PROT 6.2  --    ALBUMIN 2.3* 2.3*     All labs within the past 24 hours have been reviewed.     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  ESRD had SCUF/SLED 6/6. Will have 8 hours SLED tonight with adjustment of the orders according to the hemodynamic " and metabolic parameters.    HTN with better BP.  A fib  GERD  Hyperlipid  Disk disease  Celiac disease  Irritable bowel disease  Anxiety      AMANDEEP WEBB.Sandra. MD. SHEREE. FACP.  , [employer] / [employer] ([place]  Nephrology Consultant. [facility].   [address]. [address]th [place] [address].    email: [email or web address].  Tel: Office: [phone]

## 2024-01-15 NOTE — ASSESSMENT & PLAN NOTE
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 Stepped up to the unit for persistent afib rvr. Had SBO (now resolved). 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       Neuro/Psych:   -- Sedation: none  -- Pain: Lidocaine patch   -- has some baseline anxiety, no longer on meds     Cards:   -- BP goals SBP >90, MAPs >55  -- Afib RVR   - Amio and BB held post cardiac arrest   - prn iv metoprolol for sustained RVR > 130's   - heparin gtt for anticoagulation    - transition to oral regiment once procedures complete  -- Asystolic arrest   - 1/12/24 patient went asystole. Had multiple bradycardic events prior to which had self resolved. At time of asystole, nurse performed 1 min of chest compressions and achieved ROSC. STAT TVP placement unable to be obtained due to central venous stenosis. 2nd episode of asystole resolved after minimal compressions. 3rd episode of asystole resolved with singular compression. S/p TVP via groin in Cath lab.   - TVP placed @ back up rate of 40   - tentative plan for PPM on 1/16, heparin gtt to be held 24 hours before and 5 days after   - digoxin level high --> administered digibind 1/12, transitioned to normal sinus after administration   - dig level within normal limits x 2     Pulm:   -- Goal O2 sat > 90%  -- on 1L satting upper 90's.  -- Chest tube d/c'd 1/8/24  -- CXR with pleural effusions and RLL opacification  -- CT chest (1/13): R worse than L pleural effusion, pericardial effusion   -- Thoracic surgery recommending possible IR procedure for pleural effusions later in week  -- continue pulmonary toilet with acetylcysteine and prn albuterol nebulizer        Renal:  -- history of ESRD, dialysis dependent   - CRRT per nephrology  -- trend BUN/Cr  -- continue sevelamer TIDWM  -- patient is oliguric  -- permacath in place last exchanged 10/2023  --  fistula unable to be used  -- oxybutynin in effort to reduce possible bladder spasm triggering eulogio episodes    Recent Labs   Lab 01/14/24  1646 01/14/24  2059 01/15/24  0201   BUN 43* 47*  47* 20   CREATININE 3.5* 3.3*  3.3* 1.6*           FEN / GI:   -- CT on 1/8/24 demonstrating SBO, now resolved  -- US on 1/8/24 showing distended gallbladder without signs of acute cholecystitis   - NGT removed.  - Equivocal GGC 1/10  - Scheduled reglan for gastric motility  -- PPI  -- Replace lytes as needed  -- Nutrition: diabetic/renal/gluten free soft and bite sized diet with novasource daily  - We will work to advance diet today, needs assistance with eating/drinking as she is restricted to laying flat up to 30 degree head elevation.  Aspiration precautions are of utmost importance. Dietician consulted for complex dietary restriction  - home appetite stimulant dronabinol 5 mg BID      ID:   -- Tm: afebrile; WBC 27  -- C/f HAP with RLL consolidation  -- MRSA neg, dc vanc  -- Cont zosyn for 7 day course  -- blood cultures with 1 bottle growing staph epi, likely a contaminant.  -- possible IR procedure on pleural effusions later this week, if so would extend abx coverage 4 days after source control achieved and f/u cultures from procedure    Recent Labs   Lab 01/13/24  0325 01/14/24  0223 01/15/24  0201   WBC 18.98* 25.47* 26.79*           Heme/Onc:  -- Daily CBC  -- on heparin gtt for A-fib, hold 24 hours before PPM 1/16  Recent Labs   Lab 01/11/24  0358 01/12/24  0422 01/12/24  0631 01/12/24  1316 01/13/24  0325 01/13/24  0428 01/13/24  1026 01/14/24  0223 01/15/24  0201   HGB 9.0* 8.9*  --   --  8.4*  --   --  8.8* 8.4*    312  --   --  308  --   --  338 329   APTT 39.0* 33.9* 62.5*   < >  --    < > 44.7* 45.3* 49.8*   INR 1.2 1.2 1.2  --   --   --   --   --   --     < > = values in this interval not displayed.           Endo:   -- history of diabetes  -- Gluc goal 140-180  -- SSI      PPx:   Feeding:  diabetic/renal/gluten diet and novasource  Analgesia/Sedation:  Pain is controlled, no sedation  Thromboembolic prevention: heparin gtt held for PPM  HOB >30: yes  Stress Ulcer ppx: pantoprazole  Glucose control: Critical care goal 140-180 g/dl, SSI  Bowel reg:  having BM  Invasive Lines/Drains/Airway: Permacath, PIV x2, TVP groin line  Deescalation: none        Dispo/Code Status/Palliative:   -- SICU/ Full Code

## 2024-01-15 NOTE — SUBJECTIVE & OBJECTIVE
Interval History:  Still paroxysmal atrial fibrillation. She has bradycardic episodes (no evidence of conversion pauses) during which her R-R space out, she maintains her underlying atrial rhythm, and begins to pace. Since yesterday she has been sinus into this morning. No clear heart block on telemetry around bradycardic episodes, due to lots of artifact present. These episodes are intermittent. Capture threshold <1mA.    Review of Systems   Constitutional: Negative for diaphoresis and fever.   Cardiovascular:  Negative for chest pain, dyspnea on exertion, leg swelling, near-syncope, orthopnea, palpitations, paroxysmal nocturnal dyspnea and syncope.   Respiratory:  Negative for cough and shortness of breath.    Gastrointestinal:  Negative for abdominal pain, diarrhea, nausea and vomiting.   Neurological:  Negative for light-headedness.   Psychiatric/Behavioral:  Negative for altered mental status and substance abuse.      Objective:     Vital Signs (Most Recent):  Temp: 97.4 °F (36.3 °C) (01/15/24 1515)  Pulse: 79 (01/15/24 1600)  Resp: (!) 26 (01/15/24 1600)  BP: (!) 154/70 (01/15/24 1600)  SpO2: 96 % (01/15/24 1600) Vital Signs (24h Range):  Temp:  [97.4 °F (36.3 °C)-97.6 °F (36.4 °C)] 97.4 °F (36.3 °C)  Pulse:  [] 79  Resp:  [20-43] 26  SpO2:  [86 %-98 %] 96 %  BP: (105-156)/(58-85) 154/70  Arterial Line BP: (102-178)/(50-78) 129/54     Weight: 68.9 kg (152 lb)  Body mass index is 25.29 kg/m².     SpO2: 96 %        Physical Exam  Constitutional:       Appearance: Normal appearance.   Cardiovascular:      Rate and Rhythm: Normal rate and regular rhythm.      Pulses: Normal pulses.      Heart sounds: Normal heart sounds.      Comments: TVP in place  Pulmonary:      Effort: Pulmonary effort is normal.      Breath sounds: Normal breath sounds. No wheezing or rales.   Abdominal:      General: Abdomen is flat. There is no distension.      Palpations: Abdomen is soft.      Tenderness: There is no abdominal  tenderness.   Musculoskeletal:      Right lower leg: No edema.      Left lower leg: No edema.   Skin:     General: Skin is warm and dry.   Neurological:      Mental Status: She is alert and oriented to person, place, and time. Mental status is at baseline.   Psychiatric:         Mood and Affect: Mood normal.         Behavior: Behavior normal.            Significant Labs: All pertinent lab results from the last 24 hours have been reviewed.    Significant Imaging:  Reviewed

## 2024-01-15 NOTE — ASSESSMENT & PLAN NOTE
BG goal: 140-180    - Levemir 12 units daily (20% increase due to FBG above goal)   - Novolog Mod dose correction with ISF 25 starting at 150 prn q 4 hr   - POCT Glucose every 4 hours while NPO   - Hypoglycemia protocol in place      ** Please notify Endocrine for any change and/or advance in diet**  ** Please call Endocrine for any BG related issues **     Discharge Planning:   TBD. Please notify endocrinology prior to discharge.

## 2024-01-15 NOTE — SUBJECTIVE & OBJECTIVE
"Interval HPI:   Overnight events: No acute events overnight. Patient in room 29428 CVICU/93049 CVICU A. Blood glucose worsening. BG at and above goal on current insulin regimen (SSI ). BG trending upward. Steroid use- None. 3 Days Post-Op  Renal function- Abnormal - Creatinine 1.6  Vasopressors-  None       Endocrine will continue to follow and manage insulin orders inpatient.         Diet diabetic Soft & Bite Sized (IDDSI Level 6), Gluten Free, Renal; 2000 Calorie; Standard Tray  Diet NPO Except for: Sips with Medication     Eatin%  Nausea: No  Hypoglycemia and intervention: No  Fever: No  TPN and/or TF: No      /73 (BP Location: Right arm, Patient Position: Lying)   Pulse (!) 118   Temp 97.6 °F (36.4 °C) (Axillary)   Resp (!) 32   Ht 5' 5" (1.651 m)   Wt 68.9 kg (152 lb)   LMP  (LMP Unknown) Comment: BEAU/ NO BSO  1986  SpO2 96% Comment: ABG Value  Breastfeeding No   BMI 25.29 kg/m²     Labs Reviewed and Include    Recent Labs   Lab 01/15/24  0201   *   CALCIUM 9.0   ALBUMIN 2.3*   PROT 6.2      K 4.5   CO2 17*   *   BUN 20   CREATININE 1.6*   ALKPHOS 159*   ALT 5*   AST 17   BILITOT 0.7     Lab Results   Component Value Date    WBC 26.79 (H) 01/15/2024    HGB 8.4 (L) 01/15/2024    HCT 24.5 (L) 01/15/2024    MCV 83 01/15/2024     01/15/2024     No results for input(s): "TSH", "FREET4" in the last 168 hours.  Lab Results   Component Value Date    HGBA1C 4.9 11/15/2023       Nutritional status:   Body mass index is 25.29 kg/m².  Lab Results   Component Value Date    ALBUMIN 2.3 (L) 01/15/2024    ALBUMIN 2.2 (L) 2024    ALBUMIN 2.2 (L) 2024     No results found for: "PREALBUMIN"    Estimated Creatinine Clearance: 30.6 mL/min (A) (based on SCr of 1.6 mg/dL (H)).    Accu-Checks  Recent Labs     24  0031 24  0533 24  0854 24  1027 24  1608 24  2108 24  0838 24  1055 24  2058 01/15/24  0832   POCTGLUCOSE 218* " 193* 154* 209* 189* 173* 181* 197* 323* 217*       Current Medications and/or Treatments Impacting Glycemic Control  Immunotherapy:    Immunosuppressants       None          Steroids:   Hormones (From admission, onward)      None          Pressors:    Autonomic Drugs (From admission, onward)      None          Hyperglycemia/Diabetes Medications:   Antihyperglycemics (From admission, onward)      Start     Stop Route Frequency Ordered    01/15/24 1015  insulin detemir U-100 (Levemir) pen 10 Units         -- SubQ Daily 01/15/24 0913    01/11/24 0824  insulin aspart U-100 pen 0-10 Units         -- SubQ Every 4 hours PRN 01/11/24 0724

## 2024-01-15 NOTE — PLAN OF CARE
SICU PLAN OF CARE NOTE    Dx: Malignant carcinoid tumor of bronchus    Goals of Care:     Vital Signs (last 12 hours):   Temp:  [97.4 °F (36.3 °C)-97.5 °F (36.4 °C)]   Pulse:  []   Resp:  [20-41]   BP: (105-156)/(58-77)   SpO2:  [94 %-97 %]   Arterial Line BP: (108-163)/(55-76)      Neuro: AAO x4, Follows Commands, and Moves All Extremities    Cardiac: NSR    Respiratory: Nasal Cannula 1.5 L    Gtts: Heparin    Urine Output: Anuric 0 cc/shift    Dialysis: SCUF, UF Rate: 350/hr    Diet: Clear Liquid, renal diet     Labs/Accuchecks: AM labs. Accuchecks QID. aPTT AM draw    Skin: Foam dressings in use. Pt turned appropriately. Incision site free of infection and is healing. Mottling present in hands and L knee region.    Shift Events:  Intermittent eulogio episodes x10

## 2024-01-16 ENCOUNTER — ANESTHESIA (OUTPATIENT)
Dept: MEDSURG UNIT | Facility: HOSPITAL | Age: 74
DRG: 163 | End: 2024-01-16
Payer: MEDICARE

## 2024-01-16 ENCOUNTER — ANESTHESIA EVENT (OUTPATIENT)
Dept: MEDSURG UNIT | Facility: HOSPITAL | Age: 74
DRG: 163 | End: 2024-01-16
Payer: MEDICARE

## 2024-01-16 LAB
ABO + RH BLD: NORMAL
ALBUMIN SERPL BCP-MCNC: 2.3 G/DL (ref 3.5–5.2)
ALP SERPL-CCNC: 158 U/L (ref 55–135)
ALT SERPL W/O P-5'-P-CCNC: <5 U/L (ref 10–44)
ANION GAP SERPL CALC-SCNC: 10 MMOL/L (ref 8–16)
ANION GAP SERPL CALC-SCNC: 10 MMOL/L (ref 8–16)
ANION GAP SERPL CALC-SCNC: 9 MMOL/L (ref 8–16)
ANISOCYTOSIS BLD QL SMEAR: ABNORMAL
APTT PPP: 22.8 SEC (ref 21–32)
AST SERPL-CCNC: 21 U/L (ref 10–40)
BASO STIPL BLD QL SMEAR: ABNORMAL
BASOPHILS # BLD AUTO: ABNORMAL K/UL (ref 0–0.2)
BASOPHILS NFR BLD: 0 % (ref 0–1.9)
BILIRUB SERPL-MCNC: 0.8 MG/DL (ref 0.1–1)
BLD GP AB SCN CELLS X3 SERPL QL: NORMAL
BUN SERPL-MCNC: 17 MG/DL (ref 8–23)
BUN SERPL-MCNC: 21 MG/DL (ref 8–23)
BUN SERPL-MCNC: 8 MG/DL (ref 8–23)
CALCIUM SERPL-MCNC: 8.3 MG/DL (ref 8.7–10.5)
CALCIUM SERPL-MCNC: 8.4 MG/DL (ref 8.7–10.5)
CALCIUM SERPL-MCNC: 8.4 MG/DL (ref 8.7–10.5)
CHLORIDE SERPL-SCNC: 105 MMOL/L (ref 95–110)
CHLORIDE SERPL-SCNC: 106 MMOL/L (ref 95–110)
CHLORIDE SERPL-SCNC: 107 MMOL/L (ref 95–110)
CO2 SERPL-SCNC: 20 MMOL/L (ref 23–29)
CO2 SERPL-SCNC: 22 MMOL/L (ref 23–29)
CO2 SERPL-SCNC: 22 MMOL/L (ref 23–29)
CREAT SERPL-MCNC: 0.7 MG/DL (ref 0.5–1.4)
CREAT SERPL-MCNC: 1.5 MG/DL (ref 0.5–1.4)
CREAT SERPL-MCNC: 1.7 MG/DL (ref 0.5–1.4)
DIFFERENTIAL METHOD BLD: ABNORMAL
EOSINOPHIL # BLD AUTO: ABNORMAL K/UL (ref 0–0.5)
EOSINOPHIL NFR BLD: 0 % (ref 0–8)
ERYTHROCYTE [DISTWIDTH] IN BLOOD BY AUTOMATED COUNT: 19.3 % (ref 11.5–14.5)
EST. GFR  (NO RACE VARIABLE): 31.5 ML/MIN/1.73 M^2
EST. GFR  (NO RACE VARIABLE): 36.6 ML/MIN/1.73 M^2
EST. GFR  (NO RACE VARIABLE): >60 ML/MIN/1.73 M^2
GIANT PLATELETS BLD QL SMEAR: PRESENT
GLUCOSE SERPL-MCNC: 130 MG/DL (ref 70–110)
GLUCOSE SERPL-MCNC: 153 MG/DL (ref 70–110)
GLUCOSE SERPL-MCNC: 194 MG/DL (ref 70–110)
HCT VFR BLD AUTO: 23.2 % (ref 37–48.5)
HGB BLD-MCNC: 7.7 G/DL (ref 12–16)
HYPOCHROMIA BLD QL SMEAR: ABNORMAL
IMM GRANULOCYTES # BLD AUTO: ABNORMAL K/UL (ref 0–0.04)
IMM GRANULOCYTES NFR BLD AUTO: ABNORMAL % (ref 0–0.5)
LYMPHOCYTES # BLD AUTO: ABNORMAL K/UL (ref 1–4.8)
LYMPHOCYTES NFR BLD: 8 % (ref 18–48)
MAGNESIUM SERPL-MCNC: 1.8 MG/DL (ref 1.6–2.6)
MAGNESIUM SERPL-MCNC: 2.5 MG/DL (ref 1.6–2.6)
MAGNESIUM SERPL-MCNC: 2.5 MG/DL (ref 1.6–2.6)
MCH RBC QN AUTO: 28.5 PG (ref 27–31)
MCHC RBC AUTO-ENTMCNC: 33.2 G/DL (ref 32–36)
MCV RBC AUTO: 86 FL (ref 82–98)
METAMYELOCYTES NFR BLD MANUAL: 2 %
MONOCYTES # BLD AUTO: ABNORMAL K/UL (ref 0.3–1)
MONOCYTES NFR BLD: 4 % (ref 4–15)
MYELOCYTES NFR BLD MANUAL: 6 %
NEUTROPHILS NFR BLD: 74 % (ref 38–73)
NEUTS BAND NFR BLD MANUAL: 5 %
NRBC BLD-RTO: 2 /100 WBC
OVALOCYTES BLD QL SMEAR: ABNORMAL
PHOSPHATE SERPL-MCNC: 2.4 MG/DL (ref 2.7–4.5)
PHOSPHATE SERPL-MCNC: 5.4 MG/DL (ref 2.7–4.5)
PHOSPHATE SERPL-MCNC: 5.6 MG/DL (ref 2.7–4.5)
PLATELET # BLD AUTO: 288 K/UL (ref 150–450)
PLATELET BLD QL SMEAR: ABNORMAL
PMV BLD AUTO: 11.4 FL (ref 9.2–12.9)
POCT GLUCOSE: 162 MG/DL (ref 70–110)
POCT GLUCOSE: 168 MG/DL (ref 70–110)
POCT GLUCOSE: 186 MG/DL (ref 70–110)
POCT GLUCOSE: 241 MG/DL (ref 70–110)
POIKILOCYTOSIS BLD QL SMEAR: SLIGHT
POLYCHROMASIA BLD QL SMEAR: ABNORMAL
POTASSIUM SERPL-SCNC: 3.8 MMOL/L (ref 3.5–5.1)
POTASSIUM SERPL-SCNC: 4.3 MMOL/L (ref 3.5–5.1)
POTASSIUM SERPL-SCNC: 4.5 MMOL/L (ref 3.5–5.1)
PROMYELOCYTES NFR BLD MANUAL: 1 %
PROT SERPL-MCNC: 6 G/DL (ref 6–8.4)
RBC # BLD AUTO: 2.7 M/UL (ref 4–5.4)
SODIUM SERPL-SCNC: 136 MMOL/L (ref 136–145)
SODIUM SERPL-SCNC: 137 MMOL/L (ref 136–145)
SODIUM SERPL-SCNC: 138 MMOL/L (ref 136–145)
SPECIMEN OUTDATE: NORMAL
TARGETS BLD QL SMEAR: ABNORMAL
WBC # BLD AUTO: 30.1 K/UL (ref 3.9–12.7)
WBC TOXIC VACUOLES BLD QL SMEAR: PRESENT

## 2024-01-16 PROCEDURE — 25500020 PHARM REV CODE 255: Performed by: STUDENT IN AN ORGANIZED HEALTH CARE EDUCATION/TRAINING PROGRAM

## 2024-01-16 PROCEDURE — 33274 TCAT INSJ/RPL PERM LDLS PM: CPT | Mod: ,,, | Performed by: STUDENT IN AN ORGANIZED HEALTH CARE EDUCATION/TRAINING PROGRAM

## 2024-01-16 PROCEDURE — 37000009 HC ANESTHESIA EA ADD 15 MINS: Performed by: STUDENT IN AN ORGANIZED HEALTH CARE EDUCATION/TRAINING PROGRAM

## 2024-01-16 PROCEDURE — 94668 MNPJ CHEST WALL SBSQ: CPT

## 2024-01-16 PROCEDURE — 02HK3NZ INSERTION OF INTRACARDIAC PACEMAKER INTO RIGHT VENTRICLE, PERCUTANEOUS APPROACH: ICD-10-PCS | Performed by: STUDENT IN AN ORGANIZED HEALTH CARE EDUCATION/TRAINING PROGRAM

## 2024-01-16 PROCEDURE — 85027 COMPLETE CBC AUTOMATED: CPT | Performed by: SURGERY

## 2024-01-16 PROCEDURE — 80053 COMPREHEN METABOLIC PANEL: CPT | Performed by: SURGERY

## 2024-01-16 PROCEDURE — 25000242 PHARM REV CODE 250 ALT 637 W/ HCPCS

## 2024-01-16 PROCEDURE — 99223 1ST HOSP IP/OBS HIGH 75: CPT | Mod: ,,, | Performed by: PHYSICIAN ASSISTANT

## 2024-01-16 PROCEDURE — 90945 DIALYSIS ONE EVALUATION: CPT

## 2024-01-16 PROCEDURE — 80100008 HC CRRT DAILY MAINTENANCE

## 2024-01-16 PROCEDURE — 63600175 PHARM REV CODE 636 W HCPCS: Performed by: INTERNAL MEDICINE

## 2024-01-16 PROCEDURE — 85007 BL SMEAR W/DIFF WBC COUNT: CPT | Performed by: SURGERY

## 2024-01-16 PROCEDURE — 11000001 HC ACUTE MED/SURG PRIVATE ROOM

## 2024-01-16 PROCEDURE — 25000003 PHARM REV CODE 250: Performed by: STUDENT IN AN ORGANIZED HEALTH CARE EDUCATION/TRAINING PROGRAM

## 2024-01-16 PROCEDURE — 25000003 PHARM REV CODE 250

## 2024-01-16 PROCEDURE — C1894 INTRO/SHEATH, NON-LASER: HCPCS | Performed by: STUDENT IN AN ORGANIZED HEALTH CARE EDUCATION/TRAINING PROGRAM

## 2024-01-16 PROCEDURE — 25000003 PHARM REV CODE 250: Performed by: SURGERY

## 2024-01-16 PROCEDURE — 94640 AIRWAY INHALATION TREATMENT: CPT

## 2024-01-16 PROCEDURE — D9220A PRA ANESTHESIA: Mod: ANES,,, | Performed by: ANESTHESIOLOGY

## 2024-01-16 PROCEDURE — 25000003 PHARM REV CODE 250: Performed by: NURSE ANESTHETIST, CERTIFIED REGISTERED

## 2024-01-16 PROCEDURE — 93010 ELECTROCARDIOGRAM REPORT: CPT | Mod: ,,, | Performed by: INTERNAL MEDICINE

## 2024-01-16 PROCEDURE — 86901 BLOOD TYPING SEROLOGIC RH(D): CPT | Performed by: STUDENT IN AN ORGANIZED HEALTH CARE EDUCATION/TRAINING PROGRAM

## 2024-01-16 PROCEDURE — 63600175 PHARM REV CODE 636 W HCPCS: Performed by: STUDENT IN AN ORGANIZED HEALTH CARE EDUCATION/TRAINING PROGRAM

## 2024-01-16 PROCEDURE — C1769 GUIDE WIRE: HCPCS | Performed by: STUDENT IN AN ORGANIZED HEALTH CARE EDUCATION/TRAINING PROGRAM

## 2024-01-16 PROCEDURE — 99233 SBSQ HOSP IP/OBS HIGH 50: CPT | Mod: ,,, | Performed by: INTERNAL MEDICINE

## 2024-01-16 PROCEDURE — 99499 UNLISTED E&M SERVICE: CPT | Mod: GC,,, | Performed by: ANESTHESIOLOGY

## 2024-01-16 PROCEDURE — 33274 TCAT INSJ/RPL PERM LDLS PM: CPT | Performed by: STUDENT IN AN ORGANIZED HEALTH CARE EDUCATION/TRAINING PROGRAM

## 2024-01-16 PROCEDURE — 99232 SBSQ HOSP IP/OBS MODERATE 35: CPT | Mod: ,,, | Performed by: INTERNAL MEDICINE

## 2024-01-16 PROCEDURE — 93005 ELECTROCARDIOGRAM TRACING: CPT

## 2024-01-16 PROCEDURE — 99900035 HC TECH TIME PER 15 MIN (STAT)

## 2024-01-16 PROCEDURE — D9220A PRA ANESTHESIA: Mod: CRNA,,, | Performed by: NURSE ANESTHETIST, CERTIFIED REGISTERED

## 2024-01-16 PROCEDURE — 63600175 PHARM REV CODE 636 W HCPCS: Performed by: NURSE ANESTHETIST, CERTIFIED REGISTERED

## 2024-01-16 PROCEDURE — 83735 ASSAY OF MAGNESIUM: CPT | Performed by: INTERNAL MEDICINE

## 2024-01-16 PROCEDURE — C1786 PMKR, SINGLE, RATE-RESP: HCPCS | Performed by: STUDENT IN AN ORGANIZED HEALTH CARE EDUCATION/TRAINING PROGRAM

## 2024-01-16 PROCEDURE — 80069 RENAL FUNCTION PANEL: CPT | Performed by: INTERNAL MEDICINE

## 2024-01-16 PROCEDURE — 37000008 HC ANESTHESIA 1ST 15 MINUTES: Performed by: STUDENT IN AN ORGANIZED HEALTH CARE EDUCATION/TRAINING PROGRAM

## 2024-01-16 PROCEDURE — 27000221 HC OXYGEN, UP TO 24 HOURS

## 2024-01-16 PROCEDURE — 84100 ASSAY OF PHOSPHORUS: CPT | Performed by: SURGERY

## 2024-01-16 PROCEDURE — 94761 N-INVAS EAR/PLS OXIMETRY MLT: CPT

## 2024-01-16 PROCEDURE — 85730 THROMBOPLASTIN TIME PARTIAL: CPT

## 2024-01-16 PROCEDURE — 25000003 PHARM REV CODE 250: Performed by: INTERNAL MEDICINE

## 2024-01-16 PROCEDURE — 63600175 PHARM REV CODE 636 W HCPCS

## 2024-01-16 DEVICE — SYS MICRA AV PACING TRANSCATH: Type: IMPLANTABLE DEVICE | Site: CHEST | Status: FUNCTIONAL

## 2024-01-16 RX ORDER — MEROPENEM AND SODIUM CHLORIDE 500 MG/50ML
500 INJECTION, SOLUTION INTRAVENOUS
Status: DISCONTINUED | OUTPATIENT
Start: 2024-01-16 | End: 2024-01-16 | Stop reason: SDUPTHER

## 2024-01-16 RX ORDER — VASOPRESSIN 20 [USP'U]/ML
INJECTION, SOLUTION INTRAMUSCULAR; SUBCUTANEOUS
Status: DISCONTINUED | OUTPATIENT
Start: 2024-01-16 | End: 2024-01-16

## 2024-01-16 RX ORDER — LIDOCAINE HYDROCHLORIDE 20 MG/ML
INJECTION, SOLUTION INFILTRATION; PERINEURAL
Status: COMPLETED | OUTPATIENT
Start: 2024-01-16 | End: 2024-01-16

## 2024-01-16 RX ORDER — MIDAZOLAM HYDROCHLORIDE 1 MG/ML
INJECTION, SOLUTION INTRAMUSCULAR; INTRAVENOUS
Status: DISCONTINUED | OUTPATIENT
Start: 2024-01-16 | End: 2024-01-16

## 2024-01-16 RX ORDER — HEPARIN SOD,PORCINE/0.9 % NACL 1000/500ML
INTRAVENOUS SOLUTION INTRAVENOUS
Status: COMPLETED | OUTPATIENT
Start: 2024-01-16 | End: 2024-01-16

## 2024-01-16 RX ORDER — HEPARIN SODIUM 1000 [USP'U]/ML
INJECTION, SOLUTION INTRAVENOUS; SUBCUTANEOUS
Status: DISCONTINUED | OUTPATIENT
Start: 2024-01-16 | End: 2024-01-16

## 2024-01-16 RX ORDER — FENTANYL CITRATE 50 UG/ML
INJECTION, SOLUTION INTRAMUSCULAR; INTRAVENOUS
Status: DISCONTINUED | OUTPATIENT
Start: 2024-01-16 | End: 2024-01-16

## 2024-01-16 RX ORDER — IODIXANOL 320 MG/ML
INJECTION, SOLUTION INTRAVASCULAR
Status: COMPLETED | OUTPATIENT
Start: 2024-01-16 | End: 2024-01-16

## 2024-01-16 RX ORDER — DEXMEDETOMIDINE HYDROCHLORIDE 100 UG/ML
INJECTION, SOLUTION INTRAVENOUS CONTINUOUS PRN
Status: DISCONTINUED | OUTPATIENT
Start: 2024-01-16 | End: 2024-01-16

## 2024-01-16 RX ADMIN — ACETYLCYSTEINE 4 ML: 100 INHALANT RESPIRATORY (INHALATION) at 07:01

## 2024-01-16 RX ADMIN — PANTOPRAZOLE SODIUM 40 MG: 40 TABLET, DELAYED RELEASE ORAL at 08:01

## 2024-01-16 RX ADMIN — METOCLOPRAMIDE HYDROCHLORIDE 5 MG: 10 INJECTION, SOLUTION INTRAMUSCULAR; INTRAVENOUS at 05:01

## 2024-01-16 RX ADMIN — ALBUTEROL SULFATE 2.5 MG: 2.5 SOLUTION RESPIRATORY (INHALATION) at 07:01

## 2024-01-16 RX ADMIN — HEPARIN SODIUM 3000 UNITS: 1000 INJECTION, SOLUTION INTRAVENOUS; SUBCUTANEOUS at 01:01

## 2024-01-16 RX ADMIN — OXYBUTYNIN CHLORIDE 5 MG: 5 TABLET ORAL at 03:01

## 2024-01-16 RX ADMIN — MEROPENEM 2 G: 1 INJECTION INTRAVENOUS at 11:01

## 2024-01-16 RX ADMIN — SODIUM CHLORIDE: 9 INJECTION, SOLUTION INTRAVENOUS at 12:01

## 2024-01-16 RX ADMIN — SODIUM PHOSPHATE, MONOBASIC, MONOHYDRATE AND SODIUM PHOSPHATE, DIBASIC, ANHYDROUS 20.01 MMOL: 142; 276 INJECTION, SOLUTION INTRAVENOUS at 06:01

## 2024-01-16 RX ADMIN — SODIUM PHOSPHATE, MONOBASIC, MONOHYDRATE AND SODIUM PHOSPHATE, DIBASIC, ANHYDROUS 20.01 MMOL: 142; 276 INJECTION, SOLUTION INTRAVENOUS at 12:01

## 2024-01-16 RX ADMIN — DEXMEDETOMIDINE HYDROCHLORIDE 0.4 MCG/KG/HR: 100 INJECTION, SOLUTION INTRAVENOUS at 12:01

## 2024-01-16 RX ADMIN — LIDOCAINE 5% 1 PATCH: 700 PATCH TOPICAL at 08:01

## 2024-01-16 RX ADMIN — METOCLOPRAMIDE HYDROCHLORIDE 5 MG: 10 INJECTION, SOLUTION INTRAMUSCULAR; INTRAVENOUS at 11:01

## 2024-01-16 RX ADMIN — ACETYLCYSTEINE 4 ML: 100 INHALANT RESPIRATORY (INHALATION) at 02:01

## 2024-01-16 RX ADMIN — SODIUM CHLORIDE: 0.9 INJECTION, SOLUTION INTRAVENOUS at 12:01

## 2024-01-16 RX ADMIN — INSULIN ASPART 4 UNITS: 100 INJECTION, SOLUTION INTRAVENOUS; SUBCUTANEOUS at 12:01

## 2024-01-16 RX ADMIN — INSULIN ASPART 2 UNITS: 100 INJECTION, SOLUTION INTRAVENOUS; SUBCUTANEOUS at 08:01

## 2024-01-16 RX ADMIN — INSULIN DETEMIR 10 UNITS: 100 INJECTION, SOLUTION SUBCUTANEOUS at 09:01

## 2024-01-16 RX ADMIN — OXYBUTYNIN CHLORIDE 5 MG: 5 TABLET ORAL at 08:01

## 2024-01-16 RX ADMIN — DRONABINOL 5 MG: 2.5 CAPSULE ORAL at 08:01

## 2024-01-16 RX ADMIN — MAGNESIUM SULFATE HEPTAHYDRATE 2 G: 40 INJECTION, SOLUTION INTRAVENOUS at 04:01

## 2024-01-16 RX ADMIN — MEROPENEM 500 MG: 500 INJECTION INTRAVENOUS at 10:01

## 2024-01-16 RX ADMIN — MIDAZOLAM 1 MG: 1 INJECTION INTRAMUSCULAR; INTRAVENOUS at 01:01

## 2024-01-16 RX ADMIN — FENTANYL CITRATE 25 MCG: 50 INJECTION INTRAMUSCULAR; INTRAVENOUS at 01:01

## 2024-01-16 RX ADMIN — MIDAZOLAM 1 MG: 1 INJECTION INTRAMUSCULAR; INTRAVENOUS at 12:01

## 2024-01-16 RX ADMIN — VASOPRESSIN 1 UNITS: 20 INJECTION INTRAVENOUS at 01:01

## 2024-01-16 RX ADMIN — ALBUTEROL SULFATE 2.5 MG: 2.5 SOLUTION RESPIRATORY (INHALATION) at 02:01

## 2024-01-16 NOTE — PROGRESS NOTES
[facility]  Nephrology  Progress Note    Patient Name: Lily Green  MRN: [id number]  Admission Date: 1/2/2024  Hospital Length of Stay: 14 days  Attending Provider: Tan Thomson MD   Primary Care Physician: Pavel Calixto MD  Principal Problem:Malignant carcinoid tumor of bronchus    Subjective:     Interval History: Continues with paroxysmal atrial fibrillation with bradycardic episodes requiring pacing at 40 bpm. Plan for cardiac pacemaker. SLED overnight. Net +63. On 3L NC.     Review of patient's allergies indicates:   Allergen Reactions    Crestor [rosuvastatin] Swelling    Gluten protein      Current Facility-Administered Medications   Medication Frequency    0.9%  NaCl infusion (CRRT USE ONLY) Continuous    0.9%  NaCl infusion PRN    0.9%  NaCl infusion Continuous    acetylcysteine 100 mg/ml (10%) solution 4 mL TID WAKE    albuterol sulfate nebulizer solution 2.5 mg Q4H PRN    bisacodyL suppository 10 mg Daily PRN    dextrose 10% bolus 125 mL 125 mL PRN    dextrose 10% bolus 250 mL 250 mL PRN    droNABinol capsule 5 mg BID    epoetin noble injection 4,000 Units Every Mon, Wed, Fri    glucagon (human recombinant) injection 1 mg PRN    glucose chewable tablet 16 g PRN    glucose chewable tablet 24 g PRN    heparin (porcine) 2,000 Units in sodium chloride 0.9% 1,000 mL PRN    heparin (porcine) injection 1,000 Units PRN    heparin infusion 1,000 units/500 ml in 0.9% NaCl (on sterile field) PRN    hydrALAZINE injection 10 mg Q6H PRN    insulin aspart U-100 pen 0-10 Units Q4H PRN    insulin detemir U-100 (Levemir) pen 10 Units Daily    iodixanoL (VISIPAQUE 320) injection PRN    LIDOcaine 5 % patch 1 patch Q24H    LIDOcaine HCL 20 mg/ml (2%) injection PRN    meropenem (MERREM) 2 g in sodium chloride 0.9% 100 mL IVPB Q12H    metoclopramide injection 5 mg Q6H    ondansetron injection 8 mg Q8H PRN    oxybutynin tablet 5 mg TID    pantoprazole EC tablet 40 mg Daily    polyethylene glycol  packet 17 g Daily    senna-docusate 8.6-50 mg per tablet 1 tablet Daily    sodium chloride 0.9% flush 10 mL PRN     Facility-Administered Medications Ordered in Other Encounters   Medication Frequency    dexmedeTOMIDine injection Continuous PRN    fentaNYL 50 mcg/mL injection PRN    heparin (porcine) injection PRN    midazolam injection PRN    sodium chloride 0.9% infusion Continuous PRN    vasopressin injection PRN       Objective:     Vital Signs (Most Recent):  Temp: 97.6 °F (36.4 °C) (01/16/24 1100)  Pulse: 90 (01/16/24 1200)  Resp: (!) 24 (01/16/24 1200)  BP: (!) 145/67 (01/16/24 1100)  SpO2: (!) 91 % (01/16/24 1200) Vital Signs (24h Range):  Temp:  [97.4 °F (36.3 °C)-98 °F (36.7 °C)] 97.6 °F (36.4 °C)  Pulse:  [] 90  Resp:  [15-43] 24  SpO2:  [81 %-100 %] 91 %  BP: (123-190)/(61-78) 145/67  Arterial Line BP: (113-175)/(51-99) 139/68     Weight: 68.9 kg (152 lb) (01/12/24 0900)  Body mass index is 25.29 kg/m².  Body surface area is 1.78 meters squared.    I/O last 3 completed shifts:  In: 6048.5 [P.O.:480; I.V.:4938.4; IV Piggyback:630.1]  Out: 6839 [Other:6839]     Physical Exam  Vitals and nursing note reviewed.   Constitutional:       Appearance: She is well-developed. She is ill-appearing.   HENT:      Head: Normocephalic and atraumatic.      Right Ear: External ear normal.      Left Ear: External ear normal.   Eyes:      General: No scleral icterus.        Right eye: No discharge.         Left eye: No discharge.      Conjunctiva/sclera: Conjunctivae normal.   Cardiovascular:      Rate and Rhythm: Normal rate and regular rhythm.      Heart sounds: Normal heart sounds. No murmur heard.     No friction rub. No gallop.   Pulmonary:      Effort: Pulmonary effort is normal. No respiratory distress.      Breath sounds: No stridor. Examination of the right-lower field reveals decreased breath sounds. Decreased breath sounds present. No wheezing or rales.   Abdominal:      General: Bowel sounds are normal.    Musculoskeletal:         General: No tenderness.      Right lower leg: Edema present.      Left lower leg: Edema present.   Skin:     General: Skin is warm and dry.   Neurological:      Mental Status: She is alert and oriented to person, place, and time.          Significant Labs:  CBC:   Recent Labs   Lab 01/16/24  0255   WBC 30.10*   RBC 2.70*   HGB 7.7*   HCT 23.2*      MCV 86   MCH 28.5   MCHC 33.2     CMP:   Recent Labs   Lab 01/16/24  0255   *   CALCIUM 8.4*   ALBUMIN 2.3*   PROT 6.0      K 3.8   CO2 22*      BUN 8   CREATININE 0.7   ALKPHOS 158*   ALT <5*   AST 21   BILITOT 0.8     All labs within the past 24 hours have been reviewed.         Assessment/Plan:     Renal/  ESRD (end stage renal disease)  S/p R thoracotomy due to RML carcinoid tumor. On HD ~ 1 year. Last HD prior to presentation 1/1/24. Pt has NELY AVF placed on 10/23 that has not been cleared by vascular. Currently using RIJ TDC.     During her admission: stepped up to ICU for Afib with RVR. Was started on BB, amio and digoxin. However 1/12/24, had multiple episodes of eulogio with few asystole requiring chest compressions. Digoxin level elevated and dig-meaghan was given with resulting decrease in levels.     Nephrology History  iHD Schedule: TTS   Unit/MD: Timbo Hinds/ Dr. Vyas  Duration: 3.5 hours   EDW: 58 kg   Access: RIJ TDC  Residual Renal Function: minimal       Volume status: net positive this admission (records indicate 5 liters)    Assessment:     -No further plans for RRT today   -Keep MAP > 65  -Keep hemoglobin > 7  -Strict ins and outs  -Avoid nephrotoxic agents if possible and renally dose medications  -Avoid drastic hemodynamic changes if possible      Oncology  * Malignant carcinoid tumor of bronchus  -management per primary         Thank you for your consult. I will follow-up with patient. Please contact us if you have any additional questions.    Christy Gaytan DNP  Nephrology  [facility] -  Cardiology

## 2024-01-16 NOTE — PLAN OF CARE
SICU PLAN OF CARE NOTE    Dx: Malignant carcinoid tumor of bronchus    Goals of Care: MAP > 55, SBP > 90, < 180, HR < 130, Monitor heart rate    Vital Signs (last 12 hours):   Temp:  [97.4 °F (36.3 °C)-97.6 °F (36.4 °C)]   Pulse:  []   Resp:  [20-43]   BP: (108-154)/(67-76)   SpO2:  [86 %-100 %]   Arterial Line BP: (102-178)/(50-78)      Neuro: AAO x4, Follows Commands, and Moves All Extremities    Cardiac: NSR    Respiratory: 2L Nasal Cannula    Gtts: Heparin    Urine Output: Anuric     Dialysis: 8 hours SLED overnight    Drains: None    Diet: Diabetic, renal, gluten free, soft/bite sized bites     Labs/Accuchecks: Q8H Renal and Mg, Daily Labs ; Accuchecks ACHS    Skin:  No signs of skin breakdown or redness noted over bony prominences. Sacral and heel foam dressings in place. Patient repositioned Q2H and as needed. Olga bed plugged in and working correctly    Shift Events:  Intermittent Afib this morning with some bradycardic episodes; more frequent bradycardic episodes this afternoon-- captured and paced by the pacer and then returned to normal sinus rhythm. Plans for pacemaker placement tomorrow. Turn off heparin gtt and NPO at midnight in preparation.    Plan of care reviewed with the patient and her family and all questions answered at this time.

## 2024-01-16 NOTE — PROGRESS NOTES
[facility]  Cardiothoracic Surgery  Progress Note    Patient Name: Lily Green  MRN: [id number]  Admission Date: 1/2/2024  Hospital Length of Stay: 14 days  Code Status: Full Code   Attending Physician: Tan Thomson MD   Referring Provider: Tan Thomson MD  Principal Problem:Malignant carcinoid tumor of bronchus      Subjective:     Post-Op Info:  Procedure(s) (LRB):  Pacemaker, Temporary, Transvenous (Right)   4 Days Post-Op     Interval History: Continues to have bradycardic episodes. Tolerating diet with bowel function. Tolerated SLED overnight      Medications:  Continuous Infusions:   sodium chloride 0.9% Stopped (01/16/24 0418)    sodium chloride 0.9% 10 mL/hr at 01/16/24 0600     Scheduled Meds:   acetylcysteine 100 mg/ml (10%)  4 mL Nebulization TID WAKE    droNABinol  5 mg Oral BID    epoetin noble (PROCRIT) injection  4,000 Units Intravenous Every Mon, Wed, Fri    insulin detemir U-100  10 Units Subcutaneous Daily    LIDOcaine  1 patch Transdermal Q24H    meropenem (MERREM) IVPB  2 g Intravenous Q12H    metoclopramide  5 mg Intravenous Q6H    oxybutynin  5 mg Oral TID    pantoprazole  40 mg Oral Daily    polyethylene glycol  17 g Oral Daily    senna-docusate 8.6-50 mg  1 tablet Oral Daily    sodium phosphate 20.01 mmol in dextrose 5 % (D5W) 250 mL IVPB  20.01 mmol Intravenous Once     PRN Meds:sodium chloride 0.9%, albuterol sulfate, bisacodyL, dextrose 10%, dextrose 10%, glucagon (human recombinant), glucose, glucose, heparin (porcine), hydrALAZINE, insulin aspart U-100, magnesium sulfate IVPB, ondansetron, sodium chloride 0.9%     Objective:     Vital Signs (Most Recent):  Temp: 97.9 °F (36.6 °C) (01/16/24 0300)  Pulse: 77 (01/16/24 0600)  Resp: (!) 27 (01/16/24 0600)  BP: (!) 190/78 (01/16/24 0315)  SpO2: 95 % (01/16/24 0300) Vital Signs (24h Range):  Temp:  [97.4 °F (36.3 °C)-97.9 °F (36.6 °C)] 97.9 °F (36.6 °C)  Pulse:  [] 77  Resp:  [15-43] 27  SpO2:   [86 %-100 %] 95 %  BP: (108-190)/(62-78) 190/78  Arterial Line BP: (102-178)/(50-99) 139/68     Weight: 68.9 kg (152 lb)  Body mass index is 25.29 kg/m².    SpO2: 95 %       Intake/Output - Last 3 Shifts         01/14 0700  01/15 0659 01/15 0700  01/16 0659 01/16 0700  01/17 0659    P.O. 336 480     I.V. (mL/kg) 3040.7 (44.1) 2925.4 (42.5)     IV Piggyback 168.7 530.5     Total Intake(mL/kg) 3545.3 (51.5) 3936 (57.1)     Urine (mL/kg/hr)       Other 4400 3873     Stool 0 0     Total Output 4400 3873     Net -854.7 +63            Stool Occurrence 2 x 1 x             Lines/Drains/Airways       Central Venous Catheter Line  Duration                  Hemodialysis Catheter 01/25/23 1501 right internal jugular 355 days    Introducer 01/12/24 1100 Femoral Vein Right;Femoral Right 3 days              Drain  Duration             Female External Urinary Catheter w/ Suction 01/13/24 0701 3 days              Arterial Line  Duration             Arterial Line 01/08/24 1324 Right Radial 7 days              Line  Duration                  Pacer Wires 01/12/24 1032 3 days              Peripheral Intravenous Line  Duration                  Hemodialysis AV Fistula 09/01/23 0800 Left forearm 137 days         Peripheral IV - Single Lumen 01/12/24 1415 20 G;1 3/4 in Right Forearm 3 days         Peripheral IV - Single Lumen 01/15/24 1230 20 G;1 3/4 in Anterior;Proximal;Right Forearm <1 day                     \  Physical Exam  Vitals and nursing note reviewed.   Constitutional:       Appearance: Normal appearance.   HENT:      Head: Normocephalic and atraumatic.      Nose: Nose normal.   Cardiovascular:      Rate and Rhythm: Normal rate and regular rhythm.      Pulses: Normal pulses.   Pulmonary:      Effort: Pulmonary effort is normal. No respiratory distress.      Comments: Right posterior chest incision c/d/I     Right chest permacath  Abdominal:      General: Abdomen is flat. There is no distension.      Palpations: Abdomen is soft.       Tenderness: There is no abdominal tenderness.      Comments: Soft, nontender abdomen   Genitourinary:     Comments: TVP wire in place, right groin  Musculoskeletal:      Right lower leg: Edema present.      Left lower leg: Edema present.   Skin:     General: Skin is warm and dry.      Coloration: Skin is pale.   Neurological:      General: No focal deficit present.      Mental Status: She is alert.        Significant Labs:  All pertinent labs from the last 24 hours have been reviewed.    Significant Diagnostics:  I have reviewed all pertinent imaging results/findings within the past 24 hours.  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 Stepped up to the ICU 1/5 with afib RVR refractory to IV metoprolol, started on amio gtt with conversion.             - TVP back up pacing at 40; intrinsic rate of 80-110s. Tentatively PPM today, heparin has been held         - tolerating CRRT-- goal net negative         - CT chest 1/13 w/ bilateral pleural effusion (R>L), pericardial effusion, atelectasis with some consolidation   - Rising leukocytosis, now on merrem  - Infectious work up 2/2 leukocytosis. She remains afebrile. Effusions and compressive atelectasis also likely contributing to leukocytosis.         - Bcx 1/11: Staph, probable contaminant          - Bcx1/12: NGTD          - Rapid blood PCR: staph epi  - Sputum Cx 1/11: klebsiella & e. coli  - Pulmonary toilet with IS, acapella, CPT  - Continue renal diet, BID novasource, dronabinol, reglan.   - Nephrology following, appreciate assistance. CRRT overnight 1/15.    - daily labs, serial RFP while on CRRT  - IR consultation for image guided thoracentesis/drain insertion; would favor right side first   - Appreciate  SICU assistance         Mandeep Tirpp, DO  Cardiothoracic Surgery  [facility] - [facility]

## 2024-01-16 NOTE — TRANSFER OF CARE
"Anesthesia Transfer of Care Note    Patient: Lily Green    Procedure(s) Performed: Procedure(s) (LRB):  INSERTION, CARDIAC PACEMAKER, LEADLESS (N/A)    Patient location: ICU    Anesthesia Type: general    Transport from OR: Transported from OR on 6-10 L/min O2 by face mask with adequate spontaneous ventilation. Continuos invasive BP monitoring in transport. Continuous SpO2 monitoring in transport. Continuous ECG monitoring in transport    Post pain: adequate analgesia    Post assessment: no apparent anesthetic complications    Post vital signs: stable    Level of consciousness: awake and alert    Nausea/Vomiting: no nausea/vomiting    Complications: none    Transfer of care protocol was followed      Last vitals: Visit Vitals  BP (!) 145/67 (BP Location: Right arm, Patient Position: Lying)   Pulse 90   Temp 36.4 °C (97.6 °F) (Oral)   Resp (!) 24   Ht 5' 5" (1.651 m)   Wt 68.9 kg (152 lb)   LMP  (LMP Unknown)   SpO2 (!) 91%   Breastfeeding No   BMI 25.29 kg/m²     "

## 2024-01-16 NOTE — ASSESSMENT & PLAN NOTE
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 On HD ~ 1 year. Last HD prior to presentation 1/1/24. Pt has NELY AVF placed on 10/23 that has not been cleared by vascular. Currently using RIJ TDC.     During her admission: stepped up to ICU for Afib with RVR. Was started on BB, amio and digoxin. However 1/12/24, had multiple episodes of eulogio with few asystole requiring chest compressions. Digoxin level elevated and dig-meaghan was given with resulting decrease in levels.     Nephrology History  iHD Schedule: TTS   Unit/MD: [facility]/ Dr. Vyas  Duration: 3.5 hours   EDW: 58 kg   Access: RIJ TDC  Residual Renal Function: minimal       Volume status: net positive this admission (records indicate 5 liters)    Assessment:     -No further plans for RRT today   -Keep MAP > 65  -Keep hemoglobin > 7  -Strict ins and outs  -Avoid nephrotoxic agents if possible and renally dose medications  -Avoid drastic hemodynamic changes if possible

## 2024-01-16 NOTE — SUBJECTIVE & OBJECTIVE
Interval History:  Still paroxysmal atrial fibrillation with bradycardic episodes requiring pacing at 40 bpm. Very symptomatic, backup rate increased to 50 bpm. Threshold <0.8mA. Symptomatic during threshold testing so stopped, but great threshold with 100% capture.     Review of Systems   Constitutional: Negative for diaphoresis and fever.   Cardiovascular:  Negative for chest pain, dyspnea on exertion, leg swelling, near-syncope, orthopnea, palpitations, paroxysmal nocturnal dyspnea and syncope.   Respiratory:  Negative for cough and shortness of breath.    Gastrointestinal:  Negative for abdominal pain, diarrhea, nausea and vomiting.   Neurological:  Negative for light-headedness.   Psychiatric/Behavioral:  Negative for altered mental status and substance abuse.      Objective:     Vital Signs (Most Recent):  Temp: 98 °F (36.7 °C) (01/16/24 0700)  Pulse: 77 (01/16/24 1015)  Resp: 18 (01/16/24 1015)  BP: (!) 150/68 (01/16/24 1000)  SpO2: 95 % (01/16/24 1015) Vital Signs (24h Range):  Temp:  [97.4 °F (36.3 °C)-98 °F (36.7 °C)] 98 °F (36.7 °C)  Pulse:  [] 77  Resp:  [15-43] 18  SpO2:  [81 %-100 %] 95 %  BP: (123-190)/(61-78) 150/68  Arterial Line BP: (102-175)/(50-99) 139/68     Weight: 68.9 kg (152 lb)  Body mass index is 25.29 kg/m².     SpO2: 95 %        Physical Exam  Constitutional:       Appearance: Normal appearance.   Cardiovascular:      Rate and Rhythm: Normal rate and regular rhythm.      Pulses: Normal pulses.      Heart sounds: Normal heart sounds.      Comments: TVP in place  Pulmonary:      Effort: Pulmonary effort is normal.      Breath sounds: Normal breath sounds. No wheezing or rales.   Abdominal:      General: Abdomen is flat. There is no distension.      Palpations: Abdomen is soft.      Tenderness: There is no abdominal tenderness.   Musculoskeletal:      Right lower leg: No edema.      Left lower leg: No edema.   Skin:     General: Skin is warm and dry.   Neurological:      Mental  Status: She is alert and oriented to person, place, and time. Mental status is at baseline.   Psychiatric:         Mood and Affect: Mood normal.         Behavior: Behavior normal.            Significant Labs: All pertinent lab results from the last 24 hours have been reviewed.    Significant Imaging:  Reviewed

## 2024-01-16 NOTE — ANESTHESIA POSTPROCEDURE EVALUATION
Anesthesia Post Evaluation    Patient: Lily Green    Procedure(s) Performed: Procedure(s) (LRB):  INSERTION, CARDIAC PACEMAKER, LEADLESS (N/A)    Final Anesthesia Type: general      Patient location during evaluation: PACU  Patient participation: Yes- Able to Participate  Level of consciousness: awake and alert and awake  Post-procedure vital signs: reviewed and stable  Pain management: adequate  Airway patency: patent    PONV status at discharge: No PONV  Anesthetic complications: no      Cardiovascular status: blood pressure returned to baseline  Respiratory status: unassisted and spontaneous ventilation  Hydration status: euvolemic  Follow-up not needed.              Vitals Value Taken Time   /60 01/16/24 1632   Temp 36.3 °C (97.4 °F) 01/16/24 1514   Pulse 71 01/16/24 1649   Resp 6 01/16/24 1649   SpO2 95 % 01/16/24 1649   Vitals shown include unvalidated device data.      No case tracking events are documented in the log.      Pain/Meredith Score: No data recorded

## 2024-01-16 NOTE — SUBJECTIVE & OBJECTIVE
"Interval HPI:   No acute events overnight. Patient in room 11983 CVICU/39116 CVICU A. Blood glucose stable. BG above goal on current insulin regimen (SSI and basal). Steroid use- None.   4 Days Post-Op  Renal function- Normal   Vasopressors-  None     Diet NPO Except for: Sips with Medication     Eating:   NPO  Nausea: No  Hypoglycemia and intervention: No  Fever: No  TPN and/or TF: No    BP (!) 164/77 (BP Location: Right arm, Patient Position: Lying)   Pulse 76   Temp 98 °F (36.7 °C) (Oral)   Resp (!) 29   Ht 5' 5" (1.651 m)   Wt 68.9 kg (152 lb)   LMP  (LMP Unknown) Comment: BEAU/ NO BSO  1986  SpO2 97%   Breastfeeding No   BMI 25.29 kg/m²     Labs Reviewed and Include    Recent Labs   Lab 01/16/24  0255   *   CALCIUM 8.4*   ALBUMIN 2.3*   PROT 6.0      K 3.8   CO2 22*      BUN 8   CREATININE 0.7   ALKPHOS 158*   ALT <5*   AST 21   BILITOT 0.8     Lab Results   Component Value Date    WBC 30.10 (H) 01/16/2024    HGB 7.7 (L) 01/16/2024    HCT 23.2 (L) 01/16/2024    MCV 86 01/16/2024     01/16/2024     No results for input(s): "TSH", "FREET4" in the last 168 hours.  Lab Results   Component Value Date    HGBA1C 4.9 11/15/2023       Nutritional status:   Body mass index is 25.29 kg/m².  Lab Results   Component Value Date    ALBUMIN 2.3 (L) 01/16/2024    ALBUMIN 2.3 (L) 01/15/2024    ALBUMIN 2.3 (L) 01/15/2024     No results found for: "PREALBUMIN"    Estimated Creatinine Clearance: 69.8 mL/min (based on SCr of 0.7 mg/dL).    Accu-Checks  Recent Labs     01/13/24  2108 01/14/24  0838 01/14/24  1055 01/14/24 2058 01/15/24  0832 01/15/24  1112 01/15/24  1157 01/15/24  1605 01/15/24  2059 01/16/24  0905   POCTGLUCOSE 173* 181* 197* 323* 217* 216* 204* 234* 241* 186*       Current Medications and/or Treatments Impacting Glycemic Control  Immunotherapy:    Immunosuppressants       None          Steroids:   Hormones (From admission, onward)      None          Pressors:    Autonomic Drugs " (From admission, onward)      None          Hyperglycemia/Diabetes Medications:   Antihyperglycemics (From admission, onward)      Start     Stop Route Frequency Ordered    01/15/24 1015  insulin detemir U-100 (Levemir) pen 10 Units         -- SubQ Daily 01/15/24 0913    01/11/24 0824  insulin aspart U-100 pen 0-10 Units         -- SubQ Every 4 hours PRN 01/11/24 0724

## 2024-01-16 NOTE — ASSESSMENT & PLAN NOTE
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 Stepped up to the ICU 1/5 with afib RVR refractory to IV metoprolol, started on amio gtt with conversion.             - TVP back up pacing at 40; intrinsic rate of 80-110s. Tentatively PPM today, heparin has been held         - tolerating CRRT-- goal net negative         - CT chest 1/13 w/ bilateral pleural effusion (R>L), pericardial effusion, atelectasis with some consolidation   - Rising leukocytosis, now on merrem  - Infectious work up 2/2 leukocytosis. She remains afebrile. Effusions and compressive atelectasis also likely contributing to leukocytosis.         - Bcx 1/11: Staph, probable contaminant          - Bcx1/12: NGTD          - Rapid blood PCR: staph epi  - Sputum Cx 1/11: klebsiella & e. coli  - Pulmonary toilet with IS, acapella, CPT  - Continue renal diet, BID novasource, dronabinol, reglan.   - Nephrology following, appreciate assistance. CRRT overnight 1/15.    - daily labs, serial RFP while on CRRT  - IR consultation for image guided thoracentesis/drain insertion; would favor right side first   - Appreciate SICU assistance

## 2024-01-16 NOTE — SUBJECTIVE & OBJECTIVE
Past Medical History:   Diagnosis Date    Anxiety     Celiac disease 2018    Celiac disease     Depression     Diabetes mellitus     Family history of breast cancer in mother      at age 68    Hyperlipidemia     Hypertension     Meniere disease     Meniere's disease, unspecified ear     Menopause     Peptic ulcer     Reflux esophagitis     Urinary tract infection     Vaginal infection     Vaginal Pap smear 2014    Pap/Hpv Negative        Past Surgical History:   Procedure Laterality Date    APPENDECTOMY      BREAST SURGERY      Tags    CARPAL TUNNEL RELEASE Bilateral 2017    ,     CLOSURE, COLOSTOMY Left 2023    Procedure: CLOSURE, COLOSTOMY;  Surgeon: Manuel aJvier MD;  Location: [facility] OR;  Service: General;  Laterality: Left;    COLONOSCOPY  2018    Normal  ( Juan A)     COLOSTOMY Right 2023    Procedure: CREATION, COLOSTOMY;  Surgeon: Manuel Javier MD;  Location: [facility] OR;  Service: General;  Laterality: Right;    DILATION AND CURETTAGE OF UTERUS  1986    DUPUYTREN CONTRACTURE RELEASE Bilateral 2017    HYSTERECTOMY      BEAU w/ appy, no BSO     INJECTION OF NEUROLYTIC AGENT AROUND LUMBAR SYMPATHETIC NERVE N/A 2022    Procedure: BLOCK, LUMBAR SYMPATHETIC;  Surgeon: Souleymane Rizo Jr., MD;  Location: [facility] PAIN MGT;  Service: Pain Management;  Laterality: N/A;  no pacemaker   pt is diabetic     INJECTION OF NEUROLYTIC AGENT AROUND LUMBAR SYMPATHETIC NERVE N/A 2022    Procedure: BLOCK, LUMBAR SYMPATHETIC;  Surgeon: Souleymane Rizo Jr., MD;  Location: [facility] PAIN MGT;  Service: Pain Management;  Laterality: N/A;  diabetic     INSERTION OF TUNNELED CENTRAL VENOUS HEMODIALYSIS CATHETER Right 2023    Procedure: INSERTION, CATHETER, HEMODIALYSIS, DUAL LUMEN;  Surgeon: Manuel Javier MD;  Location: [facility] OR;  Service: General;  Laterality: Right;    INSERTION, CATHETER, TUNNELED Left 2023    Procedure: Insertion,catheter,tunneled;  Surgeon: Watson  CHANTAL Fontenot MD;  Location: [facility] OR;  Service: General;  Laterality: Left;    LAPAROTOMY, EXPLORATORY N/A 1/14/2023    Procedure: LAPAROTOMY, EXPLORATORY;  Surgeon: Manuel Javier MD;  Location: [facility] OR;  Service: General;  Laterality: N/A;    LAPAROTOMY, EXPLORATORY N/A 1/16/2023    Procedure: LAPAROTOMY, EXPLORATORY;  Surgeon: Manuel Javier MD;  Location: [facility] OR;  Service: General;  Laterality: N/A;    LYMPHADENECTOMY Right 1/2/2024    Procedure: LYMPHADENECTOMY;  Surgeon: Tan Thomson MD;  [facility];  Service: Cardiothoracic;  Laterality: Right;    PACEMAKER, TEMPORARY, TRANSVENOUS, PEDIATRIC Right 1/12/2024    Procedure: Pacemaker, Temporary, Transvenous;  Surgeon: Todd Carlisle MD;  Location: [facility] CATH LAB;  Service: Cardiology;  Laterality: Right;    REMOVAL OF CATHETER Right 1/28/2023    Procedure: REMOVAL, CATHETER;  Surgeon: Watson Fontenot MD;  Location: [facility] OR;  Service: General;  Laterality: Right;    REMOVAL, TUNNELED CATH Right 1/25/2023    Procedure: REMOVAL, TUNNELED CATH;  Surgeon: Manuel Javier MD;  Location: [facility] OR;  Service: General;  Laterality: Right;    ROBOTIC BRONCHOSCOPY N/A 10/20/2023    Procedure: ROBOTIC BRONCHOSCOPY;  Surgeon: Mayda Monzon MD;  [facility];  Service: Pulmonary;  Laterality: N/A;    SHOULDER SURGERY  2009 & 2010    right rotator cuff    THORACOTOMY Right 1/2/2024    Procedure: THORACOTOMY;  Surgeon: Tan Thomson MD;  [facility];  Service: Cardiothoracic;  Laterality: Right;    THORACOTOMY, WITH INITIAL THERAPEUTIC WEDGE RESECTION OF LUNG Right 1/2/2024    Procedure: THORACOTOMY, WITH INITIAL THERAPEUTIC WEDGE RESECTION OF LUNG;  Surgeon: Tan Thomson MD;  [facility];  Service: Cardiothoracic;  Laterality: Right;    TONSILLECTOMY      UPPER GASTROINTESTINAL ENDOSCOPY  04/2018       Review of patient's allergies indicates:   Allergen Reactions    Crestor [rosuvastatin]  Swelling    Gluten protein        Medications:  Facility-Administered Medications Prior to Admission   Medication    capsaicin-skin cleanser patch 1 patch    capsaicin-skin cleanser patch 2 patch     Medications Prior to Admission   Medication Sig    atorvastatin (LIPITOR) 40 MG tablet Take 1 tablet (40 mg total) by mouth once daily. (Patient taking differently: Take 40 mg by mouth every evening.)    epoetin noble-epbx (RETACRIT) 10,000 unit/mL imjection Inject 1 mL (10,000 Units total) into the skin every Mon, Wed, Fri. HOLD IF HEMOGLOBIN is 10 or GREATER    DO NOT SHAKE  DO NOT DILUTE  DO NOT MIX with other drug solutions    ferrous gluconate (FERGON) 324 MG tablet Take 1 tablet (324 mg total) by mouth daily with breakfast.    meclizine (ANTIVERT) 25 mg tablet Take 1 tablet (25 mg total) by mouth 3 (three) times daily as needed for Dizziness.    melatonin (MELATIN) 3 mg tablet Take 3 tablets (9 mg total) by mouth nightly as needed for Insomnia.    midodrine (PROAMATINE) 5 MG Tab Take 1 tablet (5 mg total) by mouth 2 (two) times daily as needed (if BP is systolic below 90 mmHg and patient is symptomatic. Also if BP is below 90 mmHg during dialysis).    pantoprazole (PROTONIX) 40 MG tablet Take 1 tablet (40 mg total) by mouth once daily.    patiromer calcium sorbitex (VELTASSA) 8.4 gram PwPk Take 2 packets (16.8 g total) by mouth once daily.    sevelamer HCL (RENAGEL) 800 MG Tab Take 2 tablets (1,600 mg total) by mouth 3 (three) times daily with meals.    torsemide 40 mg Tab Take 40 mg by mouth once daily.    B complex-vitamin C-folic acid (MELLISSA-BENJY) 0.8 mg Tab Take 1 tablet (0.8 mg total) by mouth once daily.    betahistine HCl (BETAHISTINE, BULK, MISC)     blood sugar diagnostic (TRUE METRIX GLUCOSE TEST STRIP) Strp USE ONE STRIP FOR TESTING 2 TIMES A DAY    blood-glucose meter kit Use to test twice a day    calcium-vitamin D3 (OS-CIPRIANO 500 + D3) 500 mg-5 mcg (200 unit) per tablet Take 1 tablet by mouth once daily.  "   coenzyme Q10 100 mg capsule     dicyclomine (BENTYL) 10 MG capsule     dronabinoL (MARINOL) 5 MG capsule Take 1 capsule (5 mg total) by mouth 2 (two) times daily before meals.    econazole nitrate 1 % cream Apply topically 2 (two) times daily.    insulin aspart U-100 (NOVOLOG) 100 unit/mL (3 mL) InPn pen Inject before meals:  150-200=+1, 201-250=+2, 251-300=+3; 301-350=+4, over 350=+5 units    lancets Misc 1 lancet by Misc.(Non-Drug; Combo Route) route 4 (four) times daily.    mometasone (ELOCON) 0.1 % ointment Apply topically.    neomycin (MYCIFRADIN) 500 mg Tab     nutritional supplements Liqd Take 237 mLs by mouth 4 (four) times daily.    oxyCODONE (ROXICODONE) 5 MG immediate release tablet Take 5 mg by mouth every 4 (four) hours as needed.    pen needle, diabetic (BD ULTRA-FINE MARIS PEN NEEDLE) 32 gauge x 5/32" Ndle 1 Device by Misc.(Non-Drug; Combo Route) route 4 (four) times daily with meals and nightly.    pregabalin (LYRICA) 150 MG capsule TAKE ONE CAPSULE BY MOUTH 2 TIMES A DAY    pregabalin (LYRICA) 75 MG capsule Take 1 capsule (75 mg total) by mouth Daily. Give after HD    simethicone (MYLICON) 125 mg Cap capsule Take by mouth.    vit C,T-Ro-khljy-lutein-zeaxan (PRESERVISION AREDS 2) [phone] mg-unit-mg-mg Cap     vitamins  A,C,E-zinc-copper [phone] unit-mg-unit Cap Take 1 tablet by mouth once daily.     Antibiotics (From admission, onward)      Start     Stop Route Frequency Ordered    01/15/24 2300  meropenem (MERREM) 2 g in sodium chloride 0.9% 100 mL IVPB         -- IV Every 12 hours (non-standard times) 01/15/24 1421          Antifungals (From admission, onward)      None          Antivirals (From admission, onward)      None             Immunization History   Administered Date(s) Administered    COVID-19, MRNA, LN-S, PF (Pfizer) (Purple Cap) 02/13/2021, 03/06/2021    Influenza 11/30/2012    Influenza (FLUAD) - Quadrivalent - Adjuvanted - PF *Preferred* (65+) 10/03/2020    Influenza " Split 10/15/2019    Pneumococcal Conjugate - 13 Valent 04/27/2018    Pneumococcal Polysaccharide - 23 Valent 10/14/2019       Family History       Problem Relation (Age of Onset)    Arthritis Father    Breast cancer Mother, Paternal Aunt    Cancer Mother, Paternal Aunt    Diabetes Paternal Grandfather    Hyperlipidemia Sister    Vision loss Father, Mother, Sister          Social History     Socioeconomic History    Marital status:    Tobacco Use    Smoking status: Never    Smokeless tobacco: Never   Substance and Sexual Activity    Alcohol use: No    Drug use: Never    Sexual activity: Not Currently     Partners: Male     Birth control/protection: See Surgical Hx     Comment: :      Social Determinants of Health     Financial Resource Strain: Low Risk  (11/22/2023)    Overall Financial Resource Strain (CARDIA)     Difficulty of Paying Living Expenses: Not hard at all   Food Insecurity: No Food Insecurity (11/22/2023)    Hunger Vital Sign     Worried About Running Out of Food in the Last Year: Never true     Ran Out of Food in the Last Year: Never true   Transportation Needs: No Transportation Needs (11/22/2023)    PRAPARE - Transportation     Lack of Transportation (Medical): No     Lack of Transportation (Non-Medical): No   Physical Activity: Insufficiently Active (11/22/2023)    Exercise Vital Sign     Days of Exercise per Week: 4 days     Minutes of Exercise per Session: 10 min   Stress: Stress Concern Present (11/22/2023)    [employer] [place] [employer] - Occupational Stress Questionnaire     Feeling of Stress : To some extent   Social Connections: Unknown (11/22/2023)    Social Connection and Isolation Panel [NHANES]     Frequency of Communication with Friends and Family: More than three times a week     Frequency of Social Gatherings with Friends and Family: More than three times a week     Active Member of Clubs or Organizations: Yes     Attends Club or Organization Meetings: More  than 4 times per year     Marital Status:    Housing Stability: Low Risk  (11/22/2023)    Housing Stability Vital Sign     Unable to Pay for Housing in the Last Year: No     Number of Places Lived in the Last Year: 1     Unstable Housing in the Last Year: No     Review of Systems   Constitutional:  Positive for fatigue. Negative for chills, fever and unexpected weight change.   HENT:  Negative for ear pain, facial swelling, hearing loss, mouth sores, nosebleeds, rhinorrhea, sinus pressure, sore throat, tinnitus, trouble swallowing and voice change.    Eyes:  Negative for photophobia, pain, redness and visual disturbance.   Respiratory:  Positive for cough and shortness of breath. Negative for chest tightness and wheezing.    Cardiovascular:  Negative for chest pain, palpitations and leg swelling.   Gastrointestinal:  Negative for abdominal pain, blood in stool, constipation, diarrhea, nausea and vomiting.   Endocrine: Negative for cold intolerance, heat intolerance, polydipsia, polyphagia and polyuria.   Genitourinary:  Negative for decreased urine volume, dysuria, flank pain, frequency, hematuria, menstrual problem, urgency, vaginal bleeding, vaginal discharge and vaginal pain.   Musculoskeletal:  Negative for arthralgias, back pain, joint swelling, myalgias and neck pain.   Skin:  Negative for rash.   Allergic/Immunologic: Negative for environmental allergies, food allergies and immunocompromised state.   Neurological:  Negative for dizziness, seizures, syncope, weakness, light-headedness, numbness and headaches.   Hematological:  Negative for adenopathy. Does not bruise/bleed easily.   Psychiatric/Behavioral:  Negative for confusion, hallucinations, self-injury, sleep disturbance and suicidal ideas. The patient is not nervous/anxious.      Objective:     Vital Signs (Most Recent):  Temp: 97.6 °F (36.4 °C) (01/16/24 1100)  Pulse: 90 (01/16/24 1200)  Resp: (!) 24 (01/16/24 1200)  BP: (!) 145/67 (01/16/24  1100)  SpO2: (!) 91 % (01/16/24 1200) Vital Signs (24h Range):  Temp:  [97.4 °F (36.3 °C)-98 °F (36.7 °C)] 97.6 °F (36.4 °C)  Pulse:  [] 90  Resp:  [15-43] 24  SpO2:  [81 %-100 %] 91 %  BP: (123-190)/(61-78) 145/67  Arterial Line BP: (113-175)/(51-99) 139/68     Weight: 68.9 kg (152 lb)  Body mass index is 25.29 kg/m².    Estimated Creatinine Clearance: 69.8 mL/min (based on SCr of 0.7 mg/dL).     Physical Exam  Vitals and nursing note reviewed.   Constitutional:       Appearance: She is well-developed. She is ill-appearing.   HENT:      Head: Normocephalic and atraumatic.      Right Ear: External ear normal.      Left Ear: External ear normal.   Eyes:      General: No scleral icterus.        Right eye: No discharge.         Left eye: No discharge.      Conjunctiva/sclera: Conjunctivae normal.   Cardiovascular:      Rate and Rhythm: Normal rate and regular rhythm.      Heart sounds: Normal heart sounds. No murmur heard.     No friction rub. No gallop.   Pulmonary:      Effort: Pulmonary effort is normal. No respiratory distress.      Breath sounds: No stridor. Examination of the right-lower field reveals decreased breath sounds. Decreased breath sounds present. No wheezing or rales.   Abdominal:      General: Bowel sounds are normal.   Musculoskeletal:         General: No tenderness.      Right lower leg: Edema present.      Left lower leg: Edema present.   Skin:     General: Skin is warm and dry.   Neurological:      Mental Status: She is alert and oriented to person, place, and time.          Significant Labs: Blood Culture:   Recent Labs   Lab 01/11/24  1601 01/12/24  0411   LABBLOO Gram stain shahriar bottle: Gram positive cocci in clusters resembling Staph  Results called to and read back by: Gena UNGER RN 01/12/2024  18:43  COAGULASE-NEGATIVE STAPHYLOCOCCUS SPECIES  Organism is a probable contaminant  * No Growth to date  No Growth to date  No Growth to date  No Growth to date  No Growth to date      BMP:   Recent Labs   Lab 01/16/24  0255   *      K 3.8      CO2 22*   BUN 8   CREATININE 0.7   CALCIUM 8.4*   MG 1.8     CBC:   Recent Labs   Lab 01/15/24  0201 01/16/24  0255   WBC 26.79* 30.10*   HGB 8.4* 7.7*   HCT 24.5* 23.2*    288     Respiratory Culture:   Recent Labs   Lab 01/11/24  2030   GSRESP <10 epithelial cells per low power field.  Moderate WBC's  Many Gram negative rods  Rare Gram positive cocci   RESPIRATORYC No S aureus isolated.  KLEBSIELLA PNEUMONIAE  Many  *  ESCHERICHIA COLI  Many  Normal respiratory ramu also present  *       Significant Imaging: I have reviewed all pertinent imaging results/findings within the past 24 hours.

## 2024-01-16 NOTE — NURSING
PT with bradycardic episode HR 40 with no pacer spikes noted on monitor and symptomatic, Dr. Sapp notified and at bedside. EP notified by , awaiting on EP to look at telemetry.

## 2024-01-16 NOTE — PROGRESS NOTES
01/15/24 1944   Treatment   Treatment Type SLED   Treatment Status Restart   Dialysis Machine Number [id number]   Dialyzer Time (hours) 0   BVP (Liters) 0 L   Solutions Labeled and Current  Yes   Access Tunneled Cath;Right   Catheter Dressing Intact  Yes   Alarms Engaged Yes   CRRT Comments CRRT RST   $ CRRT Charges   $ CRRT Charges Restart     Report received from primary RN. CRRT started per MD order.

## 2024-01-16 NOTE — ASSESSMENT & PLAN NOTE
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 Stepped up to the unit for persistent afib rvr. Had SBO (now resolved). 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       Neuro/Psych:   -- Sedation: none  -- Pain: Lidocaine patch   -- has some baseline anxiety, no longer on meds     Cards:   -- BP goals SBP >90, MAPs >55  -- Afib RVR   - Amio and BB held post cardiac arrest   - prn iv metoprolol for sustained RVR > 130's   - heparin gtt for anticoagulation    - transition to oral regiment once procedures complete  -- Asystolic arrest   - 1/12/24 patient went asystole. Had multiple bradycardic events prior to which had self resolved. At time of asystole, nurse performed 1 min of chest compressions and achieved ROSC. STAT TVP placement unable to be obtained due to central venous stenosis. 2nd episode of asystole resolved after minimal compressions. 3rd episode of asystole resolved with singular compression. S/p TVP via groin in Cath lab.   - TVP placed @ back up rate of 40   - plan for PPM today 1/16, heparin gtt off and will continue to be held 5 days after (restart 1/21)   - digoxin level high --> administered digibind 1/12, transitioned to normal sinus after administration. Dig level now within normal limits x 2     Pulm:   -- Goal O2 sat > 90%  -- on 1L satting upper 90's.  -- Chest tube d/c'd 1/8/24  -- CXR with pleural effusions and RLL opacification  -- CT chest (1/13): R worse than L pleural effusion, pericardial effusion   -- IR consulted for pleural effusions -- requesting R side be addressed first (IR likely to drain tomorrow, 1/17)  -- continue pulmonary toilet with acetylcysteine and prn albuterol nebulizer        Renal:  -- history of ESRD, dialysis dependent   - CRRT per nephrology  -- trend BUN/Cr  -- continue sevelamer TIDWM  -- patient is oliguric  -- permacath in  place last exchanged 10/2023  -- fistula unable to be used  -- oxybutynin in effort to reduce possible bladder spasm triggering eulogio episodes    Recent Labs   Lab 01/15/24  1430 01/15/24  2057 01/16/24  0255   BUN 25* 19 8   CREATININE 2.0* 1.5* 0.7           FEN / GI:   -- CT on 1/8/24 demonstrating SBO, now resolved  -- US on 1/8/24 showing distended gallbladder without signs of acute cholecystitis   - NGT removed.  - Equivocal GGC 1/10  - Scheduled reglan for gastric motility  -- PPI  -- Replace lytes as needed  -- Nutrition: diabetic/renal/gluten free soft and bite sized diet with novasource daily  - Can restart diet after procedure, movement restrictions should be lifted at that point, but aspiration precautions are still of utmost importance. Appreciate nursing assistance with pt eating. Dietician consulted for complex dietary restriction  - home appetite stimulant dronabinol 5 mg BID      ID:   -- Tm: afebrile; WBC 30  -- C/f HAP with RLL consolidation  -- MRSA neg, dc vanc  -- Cont zosyn for 7 day course  -- blood cultures with 1 bottle growing staph epi, likely a contaminant.  -- possible IR procedure on pleural effusions later this week, if so would extend abx coverage 4 days after source control achieved and f/u cultures from procedure    Recent Labs   Lab 01/14/24  0223 01/15/24  0201 01/16/24  0255   WBC 25.47* 26.79* 30.10*           Heme/Onc:  -- Daily CBC  -- on heparin gtt for A-fib, hold hep 5 days after PPM 1/16 (restart 1/21)  Recent Labs   Lab 01/11/24  0358 01/12/24  0422 01/12/24  0631 01/12/24  1316 01/14/24  0223 01/15/24  0201 01/15/24  1117 01/15/24  1746 01/15/24  2312 01/16/24  0255   HGB 9.0* 8.9*  --    < > 8.8* 8.4*  --   --   --  7.7*    312  --    < > 338 329  --   --   --  288   APTT 39.0* 33.9* 62.5*   < > 45.3* 49.8*   < > 37.6* 56.1* 22.8   INR 1.2 1.2 1.2  --   --   --   --   --   --   --     < > = values in this interval not displayed.           Endo:   -- history of  diabetes  -- Gluc goal 140-180  -- SSI      PPx:   Feeding: diabetic/renal/gluten diet and novasource (restart after procedure)  Analgesia/Sedation:  Pain is controlled, no sedation  Thromboembolic prevention: heparin gtt held for PPM  HOB >30: yes  Stress Ulcer ppx: pantoprazole  Glucose control: Critical care goal 140-180 g/dl, SSI  Bowel reg:  having BM  Invasive Lines/Drains/Airway: Permacath, PIV x2, TVP groin line  Deescalation: none        Dispo/Code Status/Palliative:   -- SICU/ Full Code

## 2024-01-16 NOTE — CONSULTS
"[facility] - [facility]iology  Infectious Disease  Consult Note    Patient Name: Lily Green  MRN: [id number]  Admission Date: 1/2/2024  Hospital Length of Stay: 14 days  Attending Physician: Tan Thomson MD  Primary Care Provider: Pavel Calixto MD     Isolation Status: No active isolations    Patient information was obtained from patient, past medical records, and ER records.      Inpatient consult to Infectious Diseases  Consult performed by: Rosio Macedo MD  Consult ordered by: Brooklyn Sapp MD        Assessment/Plan:     Pulmonary  Parapneumonic effusion  I have reviewed hospital notes from  SIC service and other specialty providers. I have also reviewed CBC, CMP/BMP,  cultures and imaging with my interpretation as documented.     Admitted for carcinoid tumor lymphadenectomy,  robotic-assisted surgery converted to right thoracotomy with therapeutic wedge resection and open mediastinal lymph node dissection on 1/2. Hospital course complicated by Afib with RVR, ileus managed with NGT which has since been removed, and tachy-eulogio syndrome requiring temporary pacemaker. Now with multi loculated pleural effusion presumptively parapneumonic in etiology. However, diagnostic work up to assess whether effusion is infectious or non infectious not yet performed.     Now consulted for "OK" to proceed with pacemaker placement.   No absolute contraindications for PPM placement in the absence of bacteremia. As a service we do not give the "OK" or "clear" patients for surgical procedures. She will be at medium to high risk for infectious complications due to her prolonged hospital stay, and unknown etiology of her effusion. Risks should be discussed by the EP physician and the patient and decision to proceed based on the patients decision.   Can continue meropenem for now.  Please send pleural effusion aspirate for cell count, gram stain, culture, AFB stain and culture, as well as " "pathology.  Discussed management plan with the staff and/or members from SICU service.        Thank you for your consult. I will follow-up with patient. Please contact us if you have any additional questions.    Rosio Rivera MD  Infectious Disease  [facility] - Cardiology      45 minutes of total time spent on the encounter, which includes face to face time and non-face to face time preparing to see the patient (eg, review of tests), obtaining and/or reviewing separately obtained history, documenting clinical information in the electronic or other health record, independently interpreting results (not separately reported) and communicating results to the patient/family/caregiver, or care coordination (not separately reported).     Subjective:     Principal Problem: Malignant carcinoid tumor of bronchus    HPI: 73 y.o. female never smoker with DDD, benign essential tremor, meniere's disease, HTN, HLD, DM2, GERD, Celiac's, IBS who is found to have RML Carcinoid tumor presented on 1/2 for lymphadenectomy,  robotic-assisted surgery converted to right thoracotomy with therapeutic wedge resection and open mediastinal lymph node dissection. Stepped up to the unit after converting into afib RVR after dialysis 1/5. She was given 3 doses of metoprolol 5, without any response and her heart rate or rhythm.  The oxygen requirements increased to 5 L from room air.      CT chest on 1/13 revealed mediastinal LAD, Moderate-sized bilateral pleural effusions, increased from 01/08/2024; likely multiply-loculated on the right. Underlying pleural infection or pleural neoplasm not excluded.     ID consulted for: "needs meropenem for right sided loculated pleural effusion, possible IR drainage later this week."    Patient seen and evaluated by Infectious Diseases with recommendations for meropenem while awaiting further work up of the right pleural effusion.    Infectious Diseases re-consulted on 1/16 for "EP needs ok for pacemaker " "previous to pleural effusion drainage"        Past Medical History:   Diagnosis Date    Anxiety     Celiac disease 2018    Celiac disease     Depression     Diabetes mellitus     Family history of breast cancer in mother      at age 68    Hyperlipidemia     Hypertension     Meniere disease     Meniere's disease, unspecified ear     Menopause     Peptic ulcer     Reflux esophagitis     Urinary tract infection     Vaginal infection     Vaginal Pap smear 2014    Pap/Hpv Negative        Past Surgical History:   Procedure Laterality Date    APPENDECTOMY      BREAST SURGERY      Tags    CARPAL TUNNEL RELEASE Bilateral 2017    ,     CLOSURE, COLOSTOMY Left 2023    Procedure: CLOSURE, COLOSTOMY;  Surgeon: Manuel Javier MD;  Location: [facility] OR;  Service: General;  Laterality: Left;    COLONOSCOPY  2018    Normal  ( Juan A)     COLOSTOMY Right 2023    Procedure: CREATION, COLOSTOMY;  Surgeon: Manuel Javier MD;  Location: [facility] OR;  Service: General;  Laterality: Right;    DILATION AND CURETTAGE OF UTERUS  1986    DUPUYTREN CONTRACTURE RELEASE Bilateral 2017    HYSTERECTOMY      BEAU w/ appy, no BSO     INJECTION OF NEUROLYTIC AGENT AROUND LUMBAR SYMPATHETIC NERVE N/A 2022    Procedure: BLOCK, LUMBAR SYMPATHETIC;  Surgeon: Souleymane Rizo Jr., MD;  Location: [facility] PAIN MGT;  Service: Pain Management;  Laterality: N/A;  no pacemaker   pt is diabetic     INJECTION OF NEUROLYTIC AGENT AROUND LUMBAR SYMPATHETIC NERVE N/A 2022    Procedure: BLOCK, LUMBAR SYMPATHETIC;  Surgeon: Souleymane Rizo Jr., MD;  Location: [facility] PAIN MGT;  Service: Pain Management;  Laterality: N/A;  diabetic     INSERTION OF TUNNELED CENTRAL VENOUS HEMODIALYSIS CATHETER Right 2023    Procedure: INSERTION, CATHETER, HEMODIALYSIS, DUAL LUMEN;  Surgeon: Manuel Javier MD;  Location: [facility] OR;  Service: General;  Laterality: Right;    INSERTION, CATHETER, TUNNELED Left 2023    Procedure: " Insertion,catheter,tunneled;  Surgeon: Watson Fontenot MD;  Location: [facility] OR;  Service: General;  Laterality: Left;    LAPAROTOMY, EXPLORATORY N/A 1/14/2023    Procedure: LAPAROTOMY, EXPLORATORY;  Surgeon: Manuel Javier MD;  Location: [facility] OR;  Service: General;  Laterality: N/A;    LAPAROTOMY, EXPLORATORY N/A 1/16/2023    Procedure: LAPAROTOMY, EXPLORATORY;  Surgeon: Manuel Javier MD;  Location: [facility] OR;  Service: General;  Laterality: N/A;    LYMPHADENECTOMY Right 1/2/2024    Procedure: LYMPHADENECTOMY;  Surgeon: Tan Thomson MD;  Location: [facility] OR [facility] FLR;  Service: Cardiothoracic;  Laterality: Right;    PACEMAKER, TEMPORARY, TRANSVENOUS, PEDIATRIC Right 1/12/2024    Procedure: Pacemaker, Temporary, Transvenous;  Surgeon: Todd Carlisle MD;  Location: [facility] CATH LAB;  Service: Cardiology;  Laterality: Right;    REMOVAL OF CATHETER Right 1/28/2023    Procedure: REMOVAL, CATHETER;  Surgeon: Watson Fontenot MD;  Location: [facility] OR;  Service: General;  Laterality: Right;    REMOVAL, TUNNELED CATH Right 1/25/2023    Procedure: REMOVAL, TUNNELED CATH;  Surgeon: Manuel Javier MD;  Location: [facility] OR;  Service: General;  Laterality: Right;    ROBOTIC BRONCHOSCOPY N/A 10/20/2023    Procedure: ROBOTIC BRONCHOSCOPY;  Surgeon: Mayda Monzon MD;  [facility];  Service: Pulmonary;  Laterality: N/A;    SHOULDER SURGERY  2009 & 2010    right rotator cuff    THORACOTOMY Right 1/2/2024    Procedure: THORACOTOMY;  Surgeon: Tan Thomson MD;  Location: [facility] OR [facility] FLR;  Service: Cardiothoracic;  Laterality: Right;    THORACOTOMY, WITH INITIAL THERAPEUTIC WEDGE RESECTION OF LUNG Right 1/2/2024    Procedure: THORACOTOMY, WITH INITIAL THERAPEUTIC WEDGE RESECTION OF LUNG;  Surgeon: Tan Thomson MD;  Location: [facility] OR 2ND FLR;  Service: Cardiothoracic;  Laterality: Right;    TONSILLECTOMY      UPPER GASTROINTESTINAL ENDOSCOPY  04/2018       Review of patient's allergies indicates:    Allergen Reactions    Crestor [rosuvastatin] Swelling    Gluten protein        Medications:  Facility-Administered Medications Prior to Admission   Medication    capsaicin-skin cleanser patch 1 patch    capsaicin-skin cleanser patch 2 patch     Medications Prior to Admission   Medication Sig    atorvastatin (LIPITOR) 40 MG tablet Take 1 tablet (40 mg total) by mouth once daily. (Patient taking differently: Take 40 mg by mouth every evening.)    epoetin noble-epbx (RETACRIT) 10,000 unit/mL imjection Inject 1 mL (10,000 Units total) into the skin every Mon, Wed, Fri. HOLD IF HEMOGLOBIN is 10 or GREATER    DO NOT SHAKE  DO NOT DILUTE  DO NOT MIX with other drug solutions    ferrous gluconate (FERGON) 324 MG tablet Take 1 tablet (324 mg total) by mouth daily with breakfast.    meclizine (ANTIVERT) 25 mg tablet Take 1 tablet (25 mg total) by mouth 3 (three) times daily as needed for Dizziness.    melatonin (MELATIN) 3 mg tablet Take 3 tablets (9 mg total) by mouth nightly as needed for Insomnia.    midodrine (PROAMATINE) 5 MG Tab Take 1 tablet (5 mg total) by mouth 2 (two) times daily as needed (if BP is systolic below 90 mmHg and patient is symptomatic. Also if BP is below 90 mmHg during dialysis).    pantoprazole (PROTONIX) 40 MG tablet Take 1 tablet (40 mg total) by mouth once daily.    patiromer calcium sorbitex (VELTASSA) 8.4 gram PwPk Take 2 packets (16.8 g total) by mouth once daily.    sevelamer HCL (RENAGEL) 800 MG Tab Take 2 tablets (1,600 mg total) by mouth 3 (three) times daily with meals.    torsemide 40 mg Tab Take 40 mg by mouth once daily.    B complex-vitamin C-folic acid (MELLISSA-BENJY) 0.8 mg Tab Take 1 tablet (0.8 mg total) by mouth once daily.    betahistine HCl (BETAHISTINE, BULK, MISC)     blood sugar diagnostic (TRUE METRIX GLUCOSE TEST STRIP) Strp USE ONE STRIP FOR TESTING 2 TIMES A DAY    blood-glucose meter kit Use to test twice a day    calcium-vitamin D3 (OS-CIPRIANO 500 + D3) 500 mg-5 mcg (200 unit)  "per tablet Take 1 tablet by mouth once daily.    coenzyme Q10 100 mg capsule     dicyclomine (BENTYL) 10 MG capsule     dronabinoL (MARINOL) 5 MG capsule Take 1 capsule (5 mg total) by mouth 2 (two) times daily before meals.    econazole nitrate 1 % cream Apply topically 2 (two) times daily.    insulin aspart U-100 (NOVOLOG) 100 unit/mL (3 mL) InPn pen Inject before meals:  150-200=+1, 201-250=+2, 251-300=+3; 301-350=+4, over 350=+5 units    lancets Misc 1 lancet by Misc.(Non-Drug; Combo Route) route 4 (four) times daily.    mometasone (ELOCON) 0.1 % ointment Apply topically.    neomycin (MYCIFRADIN) 500 mg Tab     nutritional supplements Liqd Take 237 mLs by mouth 4 (four) times daily.    oxyCODONE (ROXICODONE) 5 MG immediate release tablet Take 5 mg by mouth every 4 (four) hours as needed.    pen needle, diabetic (BD ULTRA-FINE MARIS PEN NEEDLE) 32 gauge x 5/32" Ndle 1 Device by Misc.(Non-Drug; Combo Route) route 4 (four) times daily with meals and nightly.    pregabalin (LYRICA) 150 MG capsule TAKE ONE CAPSULE BY MOUTH 2 TIMES A DAY    pregabalin (LYRICA) 75 MG capsule Take 1 capsule (75 mg total) by mouth Daily. Give after HD    simethicone (MYLICON) 125 mg Cap capsule Take by mouth.    vit C,W-Tx-agfer-lutein-zeaxan (PRESERVISION AREDS 2) 054-252-84-1 mg-unit-mg-mg Cap     vitamins  A,C,E-zinc-copper 14,320-226-200 unit-mg-unit Cap Take 1 tablet by mouth once daily.     Antibiotics (From admission, onward)      Start     Stop Route Frequency Ordered    01/15/24 2300  meropenem (MERREM) 2 g in sodium chloride 0.9% 100 mL IVPB         -- IV Every 12 hours (non-standard times) 01/15/24 1421          Antifungals (From admission, onward)      None          Antivirals (From admission, onward)      None             Immunization History   Administered Date(s) Administered    COVID-19, MRNA, LN-S, PF (Pfizer) (Purple Cap) 02/13/2021, 03/06/2021    Influenza 11/30/2012    Influenza (FLUAD) - Quadrivalent - Adjuvanted - PF " *Preferred* (65+) 10/03/2020    Influenza Split 10/15/2019    Pneumococcal Conjugate - 13 Valent 04/27/2018    Pneumococcal Polysaccharide - 23 Valent 10/14/2019       Family History       Problem Relation (Age of Onset)    Arthritis Father    Breast cancer Mother, Paternal Aunt    Cancer Mother, Paternal Aunt    Diabetes Paternal Grandfather    Hyperlipidemia Sister    Vision loss Father, Mother, Sister          Social History     Socioeconomic History    Marital status:    Tobacco Use    Smoking status: Never    Smokeless tobacco: Never   Substance and Sexual Activity    Alcohol use: No    Drug use: Never    Sexual activity: Not Currently     Partners: Male     Birth control/protection: See Surgical Hx     Comment: :      Social Determinants of Health     Financial Resource Strain: Low Risk  (11/22/2023)    Overall Financial Resource Strain (CARDIA)     Difficulty of Paying Living Expenses: Not hard at all   Food Insecurity: No Food Insecurity (11/22/2023)    Hunger Vital Sign     Worried About Running Out of Food in the Last Year: Never true     Ran Out of Food in the Last Year: Never true   Transportation Needs: No Transportation Needs (11/22/2023)    PRAPARE - Transportation     Lack of Transportation (Medical): No     Lack of Transportation (Non-Medical): No   Physical Activity: Insufficiently Active (11/22/2023)    Exercise Vital Sign     Days of Exercise per Week: 4 days     Minutes of Exercise per Session: 10 min   Stress: Stress Concern Present (11/22/2023)    [employer] - Occupational Stress Questionnaire     Feeling of Stress : To some extent   Social Connections: Unknown (11/22/2023)    Social Connection and Isolation Panel [NHANES]     Frequency of Communication with Friends and Family: More than three times a week     Frequency of Social Gatherings with Friends and Family: More than three times a week     Active Member of Clubs or Organizations: Yes      Attends Club or Organization Meetings: More than 4 times per year     Marital Status:    Housing Stability: Low Risk  (11/22/2023)    Housing Stability Vital Sign     Unable to Pay for Housing in the Last Year: No     Number of Places Lived in the Last Year: 1     Unstable Housing in the Last Year: No     Review of Systems   Constitutional:  Positive for fatigue. Negative for chills, fever and unexpected weight change.   HENT:  Negative for ear pain, facial swelling, hearing loss, mouth sores, nosebleeds, rhinorrhea, sinus pressure, sore throat, tinnitus, trouble swallowing and voice change.    Eyes:  Negative for photophobia, pain, redness and visual disturbance.   Respiratory:  Positive for cough and shortness of breath. Negative for chest tightness and wheezing.    Cardiovascular:  Negative for chest pain, palpitations and leg swelling.   Gastrointestinal:  Negative for abdominal pain, blood in stool, constipation, diarrhea, nausea and vomiting.   Endocrine: Negative for cold intolerance, heat intolerance, polydipsia, polyphagia and polyuria.   Genitourinary:  Negative for decreased urine volume, dysuria, flank pain, frequency, hematuria, menstrual problem, urgency, vaginal bleeding, vaginal discharge and vaginal pain.   Musculoskeletal:  Negative for arthralgias, back pain, joint swelling, myalgias and neck pain.   Skin:  Negative for rash.   Allergic/Immunologic: Negative for environmental allergies, food allergies and immunocompromised state.   Neurological:  Negative for dizziness, seizures, syncope, weakness, light-headedness, numbness and headaches.   Hematological:  Negative for adenopathy. Does not bruise/bleed easily.   Psychiatric/Behavioral:  Negative for confusion, hallucinations, self-injury, sleep disturbance and suicidal ideas. The patient is not nervous/anxious.      Objective:     Vital Signs (Most Recent):  Temp: 97.6 °F (36.4 °C) (01/16/24 1100)  Pulse: 90 (01/16/24 1200)  Resp: (!) 24  (01/16/24 1200)  BP: (!) 145/67 (01/16/24 1100)  SpO2: (!) 91 % (01/16/24 1200) Vital Signs (24h Range):  Temp:  [97.4 °F (36.3 °C)-98 °F (36.7 °C)] 97.6 °F (36.4 °C)  Pulse:  [] 90  Resp:  [15-43] 24  SpO2:  [81 %-100 %] 91 %  BP: (123-190)/(61-78) 145/67  Arterial Line BP: (113-175)/(51-99) 139/68     Weight: 68.9 kg (152 lb)  Body mass index is 25.29 kg/m².    Estimated Creatinine Clearance: 69.8 mL/min (based on SCr of 0.7 mg/dL).     Physical Exam  Vitals and nursing note reviewed.   Constitutional:       Appearance: She is well-developed. She is ill-appearing.   HENT:      Head: Normocephalic and atraumatic.      Right Ear: External ear normal.      Left Ear: External ear normal.   Eyes:      General: No scleral icterus.        Right eye: No discharge.         Left eye: No discharge.      Conjunctiva/sclera: Conjunctivae normal.   Cardiovascular:      Rate and Rhythm: Normal rate and regular rhythm.      Heart sounds: Normal heart sounds. No murmur heard.     No friction rub. No gallop.   Pulmonary:      Effort: Pulmonary effort is normal. No respiratory distress.      Breath sounds: No stridor. Examination of the right-lower field reveals decreased breath sounds. Decreased breath sounds present. No wheezing or rales.   Abdominal:      General: Bowel sounds are normal.   Musculoskeletal:         General: No tenderness.      Right lower leg: Edema present.      Left lower leg: Edema present.   Skin:     General: Skin is warm and dry.   Neurological:      Mental Status: She is alert and oriented to person, place, and time.          Significant Labs: Blood Culture:   Recent Labs   Lab 01/11/24  1601 01/12/24  0411   LABBLOO Gram stain shahriar bottle: Gram positive cocci in clusters resembling Staph  Results called to and read back by: Gena UNGER RN 01/12/2024  18:43  COAGULASE-NEGATIVE STAPHYLOCOCCUS SPECIES  Organism is a probable contaminant  * No Growth to date  No Growth to date  No Growth to date  No  Growth to date  No Growth to date     BMP:   Recent Labs   Lab 01/16/24  0255   *      K 3.8      CO2 22*   BUN 8   CREATININE 0.7   CALCIUM 8.4*   MG 1.8     CBC:   Recent Labs   Lab 01/15/24  0201 01/16/24  0255   WBC 26.79* 30.10*   HGB 8.4* 7.7*   HCT 24.5* 23.2*    288     Respiratory Culture:   Recent Labs   Lab 01/11/24 2030   GSRESP <10 epithelial cells per low power field.  Moderate WBC's  Many Gram negative rods  Rare Gram positive cocci   RESPIRATORYC No S aureus isolated.  KLEBSIELLA PNEUMONIAE  Many  *  ESCHERICHIA COLI  Many  Normal respiratory ramu also present  *       Significant Imaging: I have reviewed all pertinent imaging results/findings within the past 24 hours.

## 2024-01-16 NOTE — SUBJECTIVE & OBJECTIVE
Interval History/Significant Events:   Patient continues to be tachy eulogio, requiring intermittent pacing. Backup TVP set to 40.  Had RVR yesterday sustained for a few minutes up to the 140-150s, 1x metoprolol with only mild improvement.   NPO for procedure today, hep gtt off  WBC increasing-- added merrem yesterday with ID litzyssing  IR consulted today for hopeful R thoracentesis tomorrow, L following day  CRRT overnight, running even    POD 4 from Pacemaker, Temporary, Transvenous (Right)  POD 14 from thoracotomy      Objective:     Vital Signs (Most Recent):  Temp: 97.9 °F (36.6 °C) (01/16/24 0300)  Pulse: 76 (01/16/24 0500)  Resp: 17 (01/16/24 0500)  BP: (!) 190/78 (01/16/24 0315)  SpO2: 95 % (01/16/24 0300) Vital Signs (24h Range):  Temp:  [97.4 °F (36.3 °C)-97.9 °F (36.6 °C)] 97.9 °F (36.6 °C)  Pulse:  [] 76  Resp:  [15-43] 17  SpO2:  [86 %-100 %] 95 %  BP: (108-190)/(62-85) 190/78  Arterial Line BP: (102-178)/(50-99) 139/68     Weight: 68.9 kg (152 lb)  Body mass index is 25.29 kg/m².      Intake/Output Summary (Last 24 hours) at 1/16/2024 0536  Last data filed at 1/16/2024 0500  Gross per 24 hour   Intake 4120.75 ml   Output 4234 ml   Net -113.25 ml            Physical Exam  Vitals and nursing note reviewed.   Constitutional:       General: She is not in acute distress.     Appearance: Normal appearance. She is normal weight. She is ill-appearing. She is not toxic-appearing.   HENT:      Head: Normocephalic and atraumatic.      Right Ear: External ear normal.      Left Ear: External ear normal.      Nose: Nose normal.      Comments: Nasal cannula in place  Eyes:      General: No scleral icterus.        Right eye: No discharge.         Left eye: No discharge.      Extraocular Movements: Extraocular movements intact.      Conjunctiva/sclera: Conjunctivae normal.   Cardiovascular:      Rate and Rhythm: Normal rate and regular rhythm.   Pulmonary:      Effort: Pulmonary effort is normal. No respiratory  distress.   Abdominal:      General: Abdomen is flat. There is no distension.      Palpations: Abdomen is soft. There is no mass.      Tenderness: There is no abdominal tenderness. There is no guarding.      Hernia: No hernia is present.   Genitourinary:     Comments: TVP wire in place  Musculoskeletal:         General: Normal range of motion.      Cervical back: Normal range of motion and neck supple.   Skin:     General: Skin is warm and dry.   Neurological:      General: No focal deficit present.      Mental Status: She is alert and oriented to person, place, and time. Mental status is at baseline.   Psychiatric:         Mood and Affect: Mood normal.         Behavior: Behavior normal.         Thought Content: Thought content normal.            Vents:  Oxygen Concentration (%): 24 (01/15/24 0200)    Lines/Drains/Airways       Central Venous Catheter Line  Duration                  Hemodialysis Catheter 01/25/23 1501 right internal jugular 355 days    Introducer 01/12/24 1100 Femoral Vein Right;Femoral Right 3 days              Drain  Duration             Female External Urinary Catheter w/ Suction 01/13/24 0701 2 days              Arterial Line  Duration             Arterial Line 01/08/24 1324 Right Radial 7 days              Line  Duration                  Pacer Wires 01/12/24 1032 3 days              Peripheral Intravenous Line  Duration                  Hemodialysis AV Fistula 09/01/23 0800 Left forearm 136 days         Peripheral IV - Single Lumen 01/12/24 1415 20 G;1 3/4 in Right Forearm 3 days         Peripheral IV - Single Lumen 01/15/24 1230 20 G;1 3/4 in Anterior;Proximal;Right Forearm <1 day                    Significant Labs:    CBC/Anemia Profile:  Recent Labs   Lab 01/15/24  0201 01/16/24  0255   WBC 26.79* 30.10*   HGB 8.4* 7.7*   HCT 24.5* 23.2*    288   MCV 83 86   RDW 18.9* 19.3*          Chemistries:  Recent Labs   Lab 01/15/24  0201 01/15/24  1430 01/15/24  2057 01/16/24  0255     140 140 138   K 4.5 4.7 4.8 3.8   * 114* 111* 106   CO2 17* 18* 18* 22*   BUN 20 25* 19 8   CREATININE 1.6* 2.0* 1.5* 0.7   CALCIUM 9.0 8.9 8.5* 8.4*   ALBUMIN 2.3* 2.3* 2.3* 2.3*   PROT 6.2  --   --  6.0   BILITOT 0.7  --   --  0.8   ALKPHOS 159*  --   --  158*   ALT 5*  --   --  <5*   AST 17  --   --  21   MG 2.1  2.1 2.0 2.0 1.8   PHOS 2.0* 2.9 2.3* 2.4*         All pertinent labs within the past 24 hours have been reviewed.    Significant Imaging:  I have reviewed all pertinent imaging results/findings within the past 24 hours.

## 2024-01-16 NOTE — SUBJECTIVE & OBJECTIVE
Interval History: Continues to have bradycardic episodes. Tolerating diet with bowel function. Tolerated SLED overnight      Medications:  Continuous Infusions:   sodium chloride 0.9% Stopped (01/16/24 0418)    sodium chloride 0.9% 10 mL/hr at 01/16/24 0600     Scheduled Meds:   acetylcysteine 100 mg/ml (10%)  4 mL Nebulization TID WAKE    droNABinol  5 mg Oral BID    epoetin noble (PROCRIT) injection  4,000 Units Intravenous Every Mon, Wed, Fri    insulin detemir U-100  10 Units Subcutaneous Daily    LIDOcaine  1 patch Transdermal Q24H    meropenem (MERREM) IVPB  2 g Intravenous Q12H    metoclopramide  5 mg Intravenous Q6H    oxybutynin  5 mg Oral TID    pantoprazole  40 mg Oral Daily    polyethylene glycol  17 g Oral Daily    senna-docusate 8.6-50 mg  1 tablet Oral Daily    sodium phosphate 20.01 mmol in dextrose 5 % (D5W) 250 mL IVPB  20.01 mmol Intravenous Once     PRN Meds:sodium chloride 0.9%, albuterol sulfate, bisacodyL, dextrose 10%, dextrose 10%, glucagon (human recombinant), glucose, glucose, heparin (porcine), hydrALAZINE, insulin aspart U-100, magnesium sulfate IVPB, ondansetron, sodium chloride 0.9%     Objective:     Vital Signs (Most Recent):  Temp: 97.9 °F (36.6 °C) (01/16/24 0300)  Pulse: 77 (01/16/24 0600)  Resp: (!) 27 (01/16/24 0600)  BP: (!) 190/78 (01/16/24 0315)  SpO2: 95 % (01/16/24 0300) Vital Signs (24h Range):  Temp:  [97.4 °F (36.3 °C)-97.9 °F (36.6 °C)] 97.9 °F (36.6 °C)  Pulse:  [] 77  Resp:  [15-43] 27  SpO2:  [86 %-100 %] 95 %  BP: (108-190)/(62-78) 190/78  Arterial Line BP: (102-178)/(50-99) 139/68     Weight: 68.9 kg (152 lb)  Body mass index is 25.29 kg/m².    SpO2: 95 %       Intake/Output - Last 3 Shifts         01/14 0700  01/15 0659 01/15 0700  01/16 0659 01/16 0700 01/17 0659    P.O. 336 480     I.V. (mL/kg) 3040.7 (44.1) 2925.4 (42.5)     IV Piggyback 168.7 530.5     Total Intake(mL/kg) 3545.3 (51.5) 3936 (57.1)     Urine (mL/kg/hr)       Other 4400 3873     Stool 0 0      Total Output 4400 3873     Net -854.7 +63            Stool Occurrence 2 x 1 x             Lines/Drains/Airways       Central Venous Catheter Line  Duration                  Hemodialysis Catheter 01/25/23 1501 right internal jugular 355 days    Introducer 01/12/24 1100 Femoral Vein Right;Femoral Right 3 days              Drain  Duration             Female External Urinary Catheter w/ Suction 01/13/24 0701 3 days              Arterial Line  Duration             Arterial Line 01/08/24 1324 Right Radial 7 days              Line  Duration                  Pacer Wires 01/12/24 1032 3 days              Peripheral Intravenous Line  Duration                  Hemodialysis AV Fistula 09/01/23 0800 Left forearm 137 days         Peripheral IV - Single Lumen 01/12/24 1415 20 G;1 3/4 in Right Forearm 3 days         Peripheral IV - Single Lumen 01/15/24 1230 20 G;1 3/4 in Anterior;Proximal;Right Forearm <1 day                     \  Physical Exam  Vitals and nursing note reviewed.   Constitutional:       Appearance: Normal appearance.   HENT:      Head: Normocephalic and atraumatic.      Nose: Nose normal.   Cardiovascular:      Rate and Rhythm: Normal rate and regular rhythm.      Pulses: Normal pulses.   Pulmonary:      Effort: Pulmonary effort is normal. No respiratory distress.      Comments: Right posterior chest incision c/d/I     Right chest permacath  Abdominal:      General: Abdomen is flat. There is no distension.      Palpations: Abdomen is soft.      Tenderness: There is no abdominal tenderness.      Comments: Soft, nontender abdomen   Genitourinary:     Comments: TVP wire in place, right groin  Musculoskeletal:      Right lower leg: Edema present.      Left lower leg: Edema present.   Skin:     General: Skin is warm and dry.      Coloration: Skin is pale.   Neurological:      General: No focal deficit present.      Mental Status: She is alert.        Significant Labs:  All pertinent labs from the last 24 hours have  been reviewed.    Significant Diagnostics:  I have reviewed all pertinent imaging results/findings within the past 24 hours.

## 2024-01-16 NOTE — PROGRESS NOTES
[facility] - Surgical Intensive Care  Critical Care - Surgery  Progress Note    Patient Name: Lily Green  MRN: [id number]  Admission Date: 1/2/2024  Hospital Length of Stay: 14 days  Code Status: Full Code  Attending Provider: Tan Thomson MD  Primary Care Provider: Pavel Calixto MD   Principal Problem: Malignant carcinoid tumor of bronchus    Subjective:     Interval History/Significant Events:   Patient continues to be tachy eulogio, requiring intermittent pacing. Backup TVP set to 40.  Had RVR yesterday sustained for a few minutes up to the 140-150s, 1x metoprolol with only mild improvement.   NPO for procedure today, hep gtt off  WBC increasing-- added merrem yesterday with ID timothy  IR consulted today for hopeful R thoracentesis tomorrow, L following day  CRRT overnight, running even    POD 4 from Pacemaker, Temporary, Transvenous (Right)  POD 14 from thoracotomy      Objective:     Vital Signs (Most Recent):  Temp: 97.9 °F (36.6 °C) (01/16/24 0300)  Pulse: 76 (01/16/24 0500)  Resp: 17 (01/16/24 0500)  BP: (!) 190/78 (01/16/24 0315)  SpO2: 95 % (01/16/24 0300) Vital Signs (24h Range):  Temp:  [97.4 °F (36.3 °C)-97.9 °F (36.6 °C)] 97.9 °F (36.6 °C)  Pulse:  [] 76  Resp:  [15-43] 17  SpO2:  [86 %-100 %] 95 %  BP: (108-190)/(62-85) 190/78  Arterial Line BP: (102-178)/(50-99) 139/68     Weight: 68.9 kg (152 lb)  Body mass index is 25.29 kg/m².      Intake/Output Summary (Last 24 hours) at 1/16/2024 0536  Last data filed at 1/16/2024 0500  Gross per 24 hour   Intake 4120.75 ml   Output 4234 ml   Net -113.25 ml            Physical Exam  Vitals and nursing note reviewed.   Constitutional:       General: She is not in acute distress.     Appearance: Normal appearance. She is normal weight. She is ill-appearing. She is not toxic-appearing.   HENT:      Head: Normocephalic and atraumatic.      Right Ear: External ear normal.      Left Ear: External ear normal.      Nose: Nose normal.       Comments: Nasal cannula in place  Eyes:      General: No scleral icterus.        Right eye: No discharge.         Left eye: No discharge.      Extraocular Movements: Extraocular movements intact.      Conjunctiva/sclera: Conjunctivae normal.   Cardiovascular:      Rate and Rhythm: Normal rate and regular rhythm.   Pulmonary:      Effort: Pulmonary effort is normal. No respiratory distress.   Abdominal:      General: Abdomen is flat. There is no distension.      Palpations: Abdomen is soft. There is no mass.      Tenderness: There is no abdominal tenderness. There is no guarding.      Hernia: No hernia is present.   Genitourinary:     Comments: TVP wire in place  Musculoskeletal:         General: Normal range of motion.      Cervical back: Normal range of motion and neck supple.   Skin:     General: Skin is warm and dry.   Neurological:      General: No focal deficit present.      Mental Status: She is alert and oriented to person, place, and time. Mental status is at baseline.   Psychiatric:         Mood and Affect: Mood normal.         Behavior: Behavior normal.         Thought Content: Thought content normal.            Vents:  Oxygen Concentration (%): 24 (01/15/24 0200)    Lines/Drains/Airways       Central Venous Catheter Line  Duration                  Hemodialysis Catheter 01/25/23 1501 right internal jugular 355 days    Introducer 01/12/24 1100 Femoral Vein Right;Femoral Right 3 days              Drain  Duration             Female External Urinary Catheter w/ Suction 01/13/24 0701 2 days              Arterial Line  Duration             Arterial Line 01/08/24 1324 Right Radial 7 days              Line  Duration                  Pacer Wires 01/12/24 1032 3 days              Peripheral Intravenous Line  Duration                  Hemodialysis AV Fistula 09/01/23 0800 Left forearm 136 days         Peripheral IV - Single Lumen 01/12/24 1415 20 G;1 3/4 in Right Forearm 3 days         Peripheral IV - Single Lumen  01/15/24 1230 20 G;1 3/4 in Anterior;Proximal;Right Forearm <1 day                    Significant Labs:    CBC/Anemia Profile:  Recent Labs   Lab 01/15/24  0201 01/16/24  0255   WBC 26.79* 30.10*   HGB 8.4* 7.7*   HCT 24.5* 23.2*    288   MCV 83 86   RDW 18.9* 19.3*          Chemistries:  Recent Labs   Lab 01/15/24  0201 01/15/24  1430 01/15/24  2057 01/16/24  0255    140 140 138   K 4.5 4.7 4.8 3.8   * 114* 111* 106   CO2 17* 18* 18* 22*   BUN 20 25* 19 8   CREATININE 1.6* 2.0* 1.5* 0.7   CALCIUM 9.0 8.9 8.5* 8.4*   ALBUMIN 2.3* 2.3* 2.3* 2.3*   PROT 6.2  --   --  6.0   BILITOT 0.7  --   --  0.8   ALKPHOS 159*  --   --  158*   ALT 5*  --   --  <5*   AST 17  --   --  21   MG 2.1  2.1 2.0 2.0 1.8   PHOS 2.0* 2.9 2.3* 2.4*         All pertinent labs within the past 24 hours have been reviewed.    Significant Imaging:  I have reviewed all pertinent imaging results/findings within the past 24 hours.  Assessment/Plan:     Cardiac/Vascular  Atrial fibrillation  74 yo female with 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 Stepped up to the unit for persistent afib rvr. Had SBO (now resolved). 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       Neuro/Psych:   -- Sedation: none  -- Pain: Lidocaine patch   -- has some baseline anxiety, no longer on meds     Cards:   -- BP goals SBP >90, MAPs >55  -- Afib RVR   - Amio and BB held post cardiac arrest   - prn iv metoprolol for sustained RVR > 130's   - heparin gtt for anticoagulation    - transition to oral regiment once procedures complete  -- Asystolic arrest   - 1/12/24 patient went asystole. Had multiple bradycardic events prior to which had self resolved. At time of asystole, nurse performed 1 min of chest compressions and achieved ROSC. STAT TVP placement unable to be obtained due to central venous stenosis. 2nd episode of asystole  resolved after minimal compressions. 3rd episode of asystole resolved with singular compression. S/p TVP via groin in Cath lab.   - TVP placed @ back up rate of 40   - plan for PPM today 1/16, heparin gtt off and will continue to be held 5 days after (restart 1/21)   - digoxin level high --> administered digibind 1/12, transitioned to normal sinus after administration. Dig level now within normal limits x 2     Pulm:   -- Goal O2 sat > 90%  -- on 1L satting upper 90's.  -- Chest tube d/c'd 1/8/24  -- CXR with pleural effusions and RLL opacification  -- CT chest (1/13): R worse than L pleural effusion, pericardial effusion   -- IR consulted for pleural effusions -- requesting R side be addressed first (IR likely to drain tomorrow, 1/17)  -- continue pulmonary toilet with acetylcysteine and prn albuterol nebulizer        Renal:  -- history of ESRD, dialysis dependent   - CRRT per nephrology  -- trend BUN/Cr  -- continue sevelamer TIDWM  -- patient is oliguric  -- permacath in place last exchanged 10/2023  -- fistula unable to be used  -- oxybutynin in effort to reduce possible bladder spasm triggering eulogio episodes    Recent Labs   Lab 01/15/24  1430 01/15/24  2057 01/16/24  0255   BUN 25* 19 8   CREATININE 2.0* 1.5* 0.7           FEN / GI:   -- CT on 1/8/24 demonstrating SBO, now resolved  -- US on 1/8/24 showing distended gallbladder without signs of acute cholecystitis   - NGT removed.  - Equivocal GGC 1/10  - Scheduled reglan for gastric motility  -- PPI  -- Replace lytes as needed  -- Nutrition: diabetic/renal/gluten free soft and bite sized diet with novasource daily  - Can restart diet after procedure, movement restrictions should be lifted at that point, but aspiration precautions are still of utmost importance. Appreciate nursing assistance with pt eating. Dietician consulted for complex dietary restriction  - home appetite stimulant dronabinol 5 mg BID      ID:   -- Tm: afebrile; WBC 30  -- C/f HAP with RLL  consolidation  -- MRSA neg, dc vanc  -- Cont zosyn for 7 day course  -- blood cultures with 1 bottle growing staph epi, likely a contaminant.  -- possible IR procedure on pleural effusions later this week, if so would extend abx coverage 4 days after source control achieved and f/u cultures from procedure    Recent Labs   Lab 01/14/24  0223 01/15/24  0201 01/16/24  0255   WBC 25.47* 26.79* 30.10*           Heme/Onc:  -- Daily CBC  -- on heparin gtt for A-fib, hold hep 5 days after PPM 1/16 (restart 1/21)  Recent Labs   Lab 01/11/24  0358 01/12/24  0422 01/12/24  0631 01/12/24  1316 01/14/24  0223 01/15/24  0201 01/15/24  1117 01/15/24  1746 01/15/24  2312 01/16/24  0255   HGB 9.0* 8.9*  --    < > 8.8* 8.4*  --   --   --  7.7*    312  --    < > 338 329  --   --   --  288   APTT 39.0* 33.9* 62.5*   < > 45.3* 49.8*   < > 37.6* 56.1* 22.8   INR 1.2 1.2 1.2  --   --   --   --   --   --   --     < > = values in this interval not displayed.           Endo:   -- history of diabetes  -- Gluc goal 140-180  -- SSI      PPx:   Feeding: diabetic/renal/gluten diet and novasource (restart after procedure)  Analgesia/Sedation:  Pain is controlled, no sedation  Thromboembolic prevention: heparin gtt held for PPM  HOB >30: yes  Stress Ulcer ppx: pantoprazole  Glucose control: Critical care goal 140-180 g/dl, SSI  Bowel reg:  having BM  Invasive Lines/Drains/Airway: Permacath, PIV x2, TVP groin line  Deescalation: none        Dispo/Code Status/Palliative:   -- SICU/ Full Code         Brooklyn Sapp MD  Critical Care - Surgery  [facility] - Surgical Intensive Care

## 2024-01-16 NOTE — ASSESSMENT & PLAN NOTE
"I have reviewed hospital notes from  SIC service and other specialty providers. I have also reviewed CBC, CMP/BMP,  cultures and imaging with my interpretation as documented.     Admitted for carcinoid tumor lymphadenectomy,  robotic-assisted surgery converted to right thoracotomy with therapeutic wedge resection and open mediastinal lymph node dissection on 1/2. Hospital course complicated by Afib with RVR, ileus managed with NGT which has since been removed, and tachy-eulogio syndrome requiring temporary pacemaker. Now with multi loculated pleural effusion presumptively parapneumonic in etiology. However, diagnostic work up to assess whether effusion is infectious or non infectious not yet performed.     Now consulted for "OK" to proceed with pacemaker placement.   No absolute contraindications for PPM placement in the absence of bacteremia. As a service we do not give the "OK" or "clear" patients for surgical procedures. She will be at medium to high risk for infectious complications due to her prolonged hospital stay, and unknown etiology of her effusion. Risks should be discussed by the EP physician and the patient and decision to proceed based on the patients decision.   Can continue meropenem for now.  Please send pleural effusion aspirate for cell count, gram stain, culture, AFB stain and culture, as well as pathology.  Discussed management plan with the staff and/or members from SICU service.  "

## 2024-01-16 NOTE — PT/OT/SLP PROGRESS
Occupational Therapy      Patient Name:  Lily Green   MRN:  [id number]    Patient not seen today secondary to pt on hold due to TVP in place awaiting PPM today placement.). Will follow-up 1/17/24.    1/16/2024

## 2024-01-16 NOTE — PROGRESS NOTES
01/16/24 0119   Treatment   Treatment Type SLED   Treatment Status Daily equipment check   Dialysis Machine Number [id number]   Dialyzer Time (hours) 5.33   BVP (Liters) 60.1 L   Solutions Labeled and Current  Yes   Access Tunneled Cath;Right;IJ   Catheter Dressing Intact  Yes   Alarms Engaged Yes   CRRT Comments Daily check done.

## 2024-01-16 NOTE — H&P
See IR consult dated 1/16/24    Mackenzie Campbell PA-C  Interventional Radiology   Spectra: [phone]

## 2024-01-16 NOTE — CARE UPDATE
-Glucose Goal 140-180    -A1C:   Hemoglobin A1C   Date Value Ref Range Status   11/15/2023 4.9 4.0 - 5.6 % Final     Comment:     ADA Screening Guidelines:  5.7-6.4%  Consistent with prediabetes  >or=6.5%  Consistent with diabetes    High levels of fetal hemoglobin interfere with the HbA1C  assay. Heterozygous hemoglobin variants (HbS, HgC, etc)do  not significantly interfere with this assay.   However, presence of multiple variants may affect accuracy.     10/23/2019 6.8 % Final         -HOME REGIMEN:   LDC SSI   Blood sugar 150 to 200, + 1 unit  Blood sugar 201 to 250, + 2 units  Blood sugar 251 to 300, + 3 units  Blood sugar 301 to 350, + 4 units   Blood sugar greater than 350, + 5 units    -GLUCOSE TREND FOR THE PAST 24HRS:   Recent Labs   Lab 01/15/24  1112 01/15/24  1157 01/15/24  1605 01/15/24  2059 01/16/24  0905 01/16/24  1531   POCTGLUCOSE 216* 204* 234* 241* 186* 168*         -NO HYPOGYCEMIAS NOTED     - Diet  Diet NPO Except for: Sips with Medication  Diet NPO Except for: Sips with Medication      Plan:   - Levemir 10 units daily (WBD 0.3 units/kg/day)  - Novolog Mod dose correction with ISF 25 starting at 150 prn q 4 hr while NPO   - POCT Glucose every 4 hours while NPO   - Hypoglycemia protocol in place      ** Please notify Endocrine for any change and/or advance in diet**  ** Please call Endocrine for any BG related issues **     Discharge Planning:   TBD. Please notify endocrinology prior to discharge.

## 2024-01-16 NOTE — PROGRESS NOTES
[facility] - Surgical Intensive Care  Cardiac Electrophysiology  Progress Note    Admission Date: 1/2/2024  Code Status: Full Code   Attending Physician: Tan Thomson MD   Expected Discharge Date: 1/19/2024  Principal Problem:Malignant carcinoid tumor of bronchus    Subjective:     Interval History:  Still paroxysmal atrial fibrillation with bradycardic episodes requiring pacing at 40 bpm. Very symptomatic, backup rate increased to 50 bpm. Threshold <0.8mA. Symptomatic during threshold testing so stopped, but great threshold with 100% capture.     Review of Systems   Constitutional: Negative for diaphoresis and fever.   Cardiovascular:  Negative for chest pain, dyspnea on exertion, leg swelling, near-syncope, orthopnea, palpitations, paroxysmal nocturnal dyspnea and syncope.   Respiratory:  Negative for cough and shortness of breath.    Gastrointestinal:  Negative for abdominal pain, diarrhea, nausea and vomiting.   Neurological:  Negative for light-headedness.   Psychiatric/Behavioral:  Negative for altered mental status and substance abuse.      Objective:     Vital Signs (Most Recent):  Temp: 98 °F (36.7 °C) (01/16/24 0700)  Pulse: 77 (01/16/24 1015)  Resp: 18 (01/16/24 1015)  BP: (!) 150/68 (01/16/24 1000)  SpO2: 95 % (01/16/24 1015) Vital Signs (24h Range):  Temp:  [97.4 °F (36.3 °C)-98 °F (36.7 °C)] 98 °F (36.7 °C)  Pulse:  [] 77  Resp:  [15-43] 18  SpO2:  [81 %-100 %] 95 %  BP: (123-190)/(61-78) 150/68  Arterial Line BP: (102-175)/(50-99) 139/68     Weight: 68.9 kg (152 lb)  Body mass index is 25.29 kg/m².     SpO2: 95 %        Physical Exam  Constitutional:       Appearance: Normal appearance.   Cardiovascular:      Rate and Rhythm: Normal rate and regular rhythm.      Pulses: Normal pulses.      Heart sounds: Normal heart sounds.      Comments: TVP in place  Pulmonary:      Effort: Pulmonary effort is normal.      Breath sounds: Normal breath sounds. No wheezing or rales.   Abdominal:       General: Abdomen is flat. There is no distension.      Palpations: Abdomen is soft.      Tenderness: There is no abdominal tenderness.   Musculoskeletal:      Right lower leg: No edema.      Left lower leg: No edema.   Skin:     General: Skin is warm and dry.   Neurological:      Mental Status: She is alert and oriented to person, place, and time. Mental status is at baseline.   Psychiatric:         Mood and Affect: Mood normal.         Behavior: Behavior normal.            Significant Labs: All pertinent lab results from the last 24 hours have been reviewed.    Significant Imaging:  Reviewed  Assessment and Plan:     Atrial fibrillation  #Bradycardia    73yoF with ESRD admitted for bronchial tumor removal with post op course complicated by atrial fibrillation. She had difficult to control rates but further post-operatively, her rates improved so was weaned off rate controlling agents. Overnight 1/11-1/12 and morning of 1/12 she had several bradycardic episodes in the setting of atrial fibrillation, recent digoxin use (reportedly renally dosed, but some high levels with ESRD).    In the setting of asystolic events with several short rounds of compressions (no more than 1 round at a time), TVP was attempted. 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 She is post PTA and stenting of the left innominate vein. This was done at an outside hospital.    Digifab with successful reversal. Continuing to monitor with TVP in place over the weekend  She continues to have very brief and transient bradycardic episodes, requiring pacing per TVP at a rate of 40bpm    EF 60-65% during this admission  Jan 13, 2024 EKG: Sinus rhythm,  QRS 76    Recommendations:  - TVP backup rate 50  - Attempting for Micra today        Connor M Gillies, MD  Cardiac Electrophysiology  [facility] - Surgical Intensive Care

## 2024-01-16 NOTE — PT/OT/SLP PROGRESS
Physical Therapy      Patient Name:  Lily Green   MRN:  [id number]    Patient not seen today secondary to  (pt on hold due to TVP with scheduled PPM placement today.). Will follow-up at a later date.    1/16/2024  .

## 2024-01-16 NOTE — ANESTHESIA PREPROCEDURE EVALUATION
[facility]  Anesthesia Pre-Operative Evaluation         Patient Name/: Lily Green, 1950  MRN: [id number]    SUBJECTIVE:     Pre-operative evaluation for Procedure(s) (LRB):  Venogram, EP Lab (N/A)  INSERTION, CARDIAC PACEMAKER, DUAL CHAMBER (N/A)  INSERTION, CARDIAC PACEMAKER, LEADLESS (N/A)     2024    Lily Green is a 73 y.o. female     Patient now presents for the above procedure(s).    ________________________________________  Results for orders placed during the hospital encounter of 24    Echo    Interpretation Summary    Left Ventricle: The left ventricle is normal in size. Normal wall thickness. Normal wall motion. There is normal systolic function with a visually estimated ejection fraction of 60 - 65%. There is normal diastolic function.    Right Ventricle: Normal right ventricular cavity size. Wall thickness is normal. Right ventricle wall motion  is normal. Systolic function is normal.    Aortic Valve: The aortic valve is a trileaflet valve. There is moderate aortic valve sclerosis. There is mild annular calcification present. Mildly restricted motion. There is mild aortic regurgitation.    Mitral Valve: There is mild to moderate regurgitation.    Tricuspid Valve: The tricuspid valve is structurally normal. There is trace regurgitation.  No evidence of carcinoid disease of the tricuspid valve.    Pulmonary Artery: There is borderline elevated pulmonary hypertension. The estimated pulmonary artery systolic pressure is 34 mmHg.    IVC/SVC: Intermediate venous pressure at 8 mmHg.    Large left pleural effusion.    Pericardium: There is a moderate circumferential effusion, largest anterior to the tricuspid valve annulus. Pericardial effusion has focal strands. No indication of cardiac tamponade. Evidence includes no IVC dilation, no chamber collapse.    ________________________________________    LDA:        Hemodialysis Catheter 23 1501  right internal jugular (Active)   Line Necessity Review CRRT/HD 01/16/24 0715   Verification by X-ray Yes 01/16/24 0715   Site Assessment No drainage;No redness;No swelling;No warmth 01/16/24 0715   Line Securement Device Secured with sutures 01/16/24 0715   Dressing Type CHG impregnated dressing/sponge;Central line dressing 01/16/24 0715   Dressing Status Clean;Dry;Intact 01/16/24 0715   Dressing Intervention Integrity maintained 01/16/24 0715   Date on Dressing 01/15/24 01/16/24 0715   Dressing Due to be Changed 01/15/24 01/16/24 0715   Venous Patency/Care normal saline locked 01/16/24 0715   Arterial Patency/Care normal saline locked 01/16/24 0715   Waveform Not being transduced 01/16/24 0715   Number of days: 355       Introducer 01/12/24 1100 Femoral Vein Right;Femoral Right (Active)   Line Necessity Review Hemodynamic instability 01/16/24 0715   Site Assessment No warmth;No swelling;No redness;No drainage 01/16/24 0715   Line Securement Device Secured with sutures 01/16/24 0715   Dressing Type CHG impregnated dressing/sponge;Central line dressing 01/16/24 0715   Dressing Status Clean;Dry;Intact 01/16/24 0715   Dressing Intervention Integrity maintained 01/16/24 0715   Date on Dressing 01/15/24 01/16/24 0715   Dressing Due to be Changed 01/26/24 01/16/24 0715   Number of days: 3            Peripheral IV - Single Lumen 01/12/24 1415 20 G;1 3/4 in Right Forearm (Active)   Site Assessment Clean;Dry;Intact;Swollen 01/16/24 0715   Extremity Assessment Distal to IV No warmth;No abnormal discoloration;Extravasated 01/16/24 0715   Line Status Saline locked 01/16/24 0715   Dressing Status Clean;Dry;Intact 01/16/24 0715   Dressing Intervention Integrity maintained 01/16/24 0715   Dressing Change Due 01/16/24 01/16/24 0715   Site Change Due 01/16/24 01/16/24 0715   Reason Not Rotated Not due 01/16/24 0715   Number of days: 3            Peripheral IV - Single Lumen 01/15/24 1230 20 G;1 3/4 in Anterior;Proximal;Right  Forearm (Active)   Site Assessment Draining 01/16/24 0715   Extremity Assessment Distal to IV No warmth;No swelling;No redness;No abnormal discoloration 01/16/24 0715   Line Status Infusing 01/16/24 0715   Dressing Status Old drainage 01/16/24 0715   Dressing Intervention Sterile dressing change 01/16/24 0715   Dressing Change Due 01/19/24 01/16/24 0715   Site Change Due 01/19/24 01/16/24 0715   Reason Not Rotated Not due 01/16/24 0715   Number of days: 0            Hemodialysis AV Fistula 09/01/23 0800 Left forearm (Active)   Site Assessment Clean;Dry;Intact;No redness;No swelling 01/16/24 0715   Patency Thrill;Bruit;Present 01/16/24 0715   Status Not Accessed 01/16/24 0715   Site Condition No complications 01/15/24 1501   Number of days: 137       Arterial Line 01/08/24 1324 Right Radial (Active)   $ Arterial Line Charges (Upon Insertion) Bedside Insertion Performed 01/08/24 1324   Site Assessment Clean;Dry;Intact;No redness;No swelling 01/16/24 0301   Line Status Pulsatile blood flow 01/16/24 0301   Art Line Waveform Appropriate;Square wave test performed 01/16/24 0301   Arterial Line Interventions Zeroed and calibrated;Leveled;Line pulled back;Flushed per protocol;Connections checked and tightened 01/16/24 0301   Color/Movement/Sensation Capillary refill less than 3 sec 01/16/24 0301   Dressing Type Central line dressing 01/16/24 0301   Dressing Status Clean;Dry;Intact 01/16/24 0301   Dressing Intervention Integrity maintained 01/16/24 0301   Dressing Change Due 01/20/24 01/16/24 0301   Tubing Change Due 01/20/24 01/16/24 0301   Number of days: 7            Pacer Wires 01/12/24 1032 (Active)   Pacer Wire Status Ventricular wires connected to pacer 01/16/24 0715   Insertion Depth (cm) 67 cm 01/16/24 0715   Site Assessment Clean;Dry;Intact 01/16/24 0715   How Pacer Wires are Secured Ventricular wires secured to dressing 01/16/24 0715   Dressing Status Clean;Dry;Intact 01/16/24 0715   Dressing Intervention  Integrity maintained 01/16/24 0715   Dressing Change Due 01/19/24 01/16/24 0715   Number of days: 3       Female External Urinary Catheter w/ Suction 01/13/24 0701 (Active)   Skin no redness;no breakdown 01/16/24 0715   Tolerance no signs/symptoms of discomfort 01/16/24 0715   Suction Continuous suction at 70 mmHg 01/16/24 0715   Date of last wick change 01/16/24 01/16/24 0715   Time of last wick change 0759 01/16/24 0715   Number of days: 3       Drips:    sodium chloride 0.9% Stopped (01/16/24 0418)    sodium chloride 0.9% 10 mL/hr at 01/16/24 0701       Patient Active Problem List   Diagnosis    Type 2 diabetes mellitus with diabetic polyneuropathy, without long-term current use of insulin    Hyperlipidemia, mixed    GERD without esophagitis    Mild recurrent major depression    Primary insomnia    Chronic neck pain    DDD (degenerative disc disease), cervical    Screening mammogram, encounter for    Celiac disease    Hand arthritis    Benign essential tremor    Chronic pain of both knees    Irritable bowel syndrome    TANIA (stress urinary incontinence, female)    Meniere's disease of both ears    Ankle weakness    Psoriasis    Idiopathic neuropathy    Bilateral foot pain    Generalized anxiety disorder    Cognitive complaints    Peritonitis    Large bowel ischemia    Elevated lactic acid level    Hyponatremia    Primary hypertension    Anemia    Hypophosphatemia    Debility    Weakness    Lung nodule    Malignant carcinoid tumor of bronchus    ESRD (end stage renal disease)    Atrial fibrillation    Anticoagulated on heparin    Anuria    A-V fistula    Bradycardia    Parapneumonic effusion       Review of patient's allergies indicates:   Allergen Reactions    Crestor [rosuvastatin] Swelling    Gluten protein        Current Inpatient Medications:    acetylcysteine 100 mg/ml (10%)  4 mL Nebulization TID WAKE    droNABinol  5 mg Oral BID    epoetin noble (PROCRIT) injection  4,000 Units Intravenous Every Mon, Wed,  Fri    insulin detemir U-100  10 Units Subcutaneous Daily    LIDOcaine  1 patch Transdermal Q24H    meropenem (MERREM) IVPB  2 g Intravenous Q12H    metoclopramide  5 mg Intravenous Q6H    oxybutynin  5 mg Oral TID    pantoprazole  40 mg Oral Daily    polyethylene glycol  17 g Oral Daily    senna-docusate 8.6-50 mg  1 tablet Oral Daily    sodium phosphate 20.01 mmol in dextrose 5 % (D5W) 250 mL IVPB  20.01 mmol Intravenous Once       No current facility-administered medications on file prior to encounter.     Current Outpatient Medications on File Prior to Encounter   Medication Sig Dispense Refill    atorvastatin (LIPITOR) 40 MG tablet Take 1 tablet (40 mg total) by mouth once daily. (Patient taking differently: Take 40 mg by mouth every evening.) 90 tablet 3    epoetin noble-epbx (RETACRIT) 10,000 unit/mL imjection Inject 1 mL (10,000 Units total) into the skin every Mon, Wed, Fri. HOLD IF HEMOGLOBIN is 10 or GREATER    DO NOT SHAKE  DO NOT DILUTE  DO NOT MIX with other drug solutions 12 mL 0    ferrous gluconate (FERGON) 324 MG tablet Take 1 tablet (324 mg total) by mouth daily with breakfast. 30 tablet 3    meclizine (ANTIVERT) 25 mg tablet Take 1 tablet (25 mg total) by mouth 3 (three) times daily as needed for Dizziness. 90 tablet 3    melatonin (MELATIN) 3 mg tablet Take 3 tablets (9 mg total) by mouth nightly as needed for Insomnia. 90 tablet 3    pantoprazole (PROTONIX) 40 MG tablet Take 1 tablet (40 mg total) by mouth once daily. 90 tablet 3    patiromer calcium sorbitex (VELTASSA) 8.4 gram PwPk Take 2 packets (16.8 g total) by mouth once daily. 180 packet 2    sevelamer HCL (RENAGEL) 800 MG Tab Take 2 tablets (1,600 mg total) by mouth 3 (three) times daily with meals. 180 tablet 11    B complex-vitamin C-folic acid (MELLISSA-BENJY) 0.8 mg Tab Take 1 tablet (0.8 mg total) by mouth once daily. 30 tablet 3    betahistine HCl (BETAHISTINE, BULK, MISC)       blood sugar diagnostic (TRUE METRIX GLUCOSE TEST STRIP)  "Strp USE ONE STRIP FOR TESTING 2 TIMES A  each 11    blood-glucose meter kit Use to test twice a day 1 each 0    calcium-vitamin D3 (OS-CIPRIANO 500 + D3) 500 mg-5 mcg (200 unit) per tablet Take 1 tablet by mouth once daily. 30 tablet 3    coenzyme Q10 100 mg capsule       dicyclomine (BENTYL) 10 MG capsule       dronabinoL (MARINOL) 5 MG capsule Take 1 capsule (5 mg total) by mouth 2 (two) times daily before meals. 60 capsule 1    econazole nitrate 1 % cream Apply topically 2 (two) times daily. 30 g 2    insulin aspart U-100 (NOVOLOG) 100 unit/mL (3 mL) InPn pen Inject before meals:  150-200=+1, 201-250=+2, 251-300=+3; 301-350=+4, over 350=+5 units 15 mL 0    lancets Misc 1 lancet by Misc.(Non-Drug; Combo Route) route 4 (four) times daily. 400 each 3    mometasone (ELOCON) 0.1 % ointment Apply topically.      neomycin (MYCIFRADIN) 500 mg Tab       nutritional supplements Liqd Take 237 mLs by mouth 4 (four) times daily. 120 each 6    oxyCODONE (ROXICODONE) 5 MG immediate release tablet Take 5 mg by mouth every 4 (four) hours as needed.      pen needle, diabetic (BD ULTRA-FINE MARIS PEN NEEDLE) 32 gauge x 5/32" Ndle 1 Device by Misc.(Non-Drug; Combo Route) route 4 (four) times daily with meals and nightly. 400 each 3    pregabalin (LYRICA) 150 MG capsule TAKE ONE CAPSULE BY MOUTH 2 TIMES A DAY 60 capsule 2    pregabalin (LYRICA) 75 MG capsule Take 1 capsule (75 mg total) by mouth Daily. Give after HD 30 capsule 0    simethicone (MYLICON) 125 mg Cap capsule Take by mouth.      vit C,U-Or-lieuj-lutein-zeaxan (PRESERVISION AREDS 2) 286-344-58-1 mg-unit-mg-mg Cap       vitamins  A,C,E-zinc-copper 14,320-226-200 unit-mg-unit Cap Take 1 tablet by mouth once daily.         Past Surgical History:   Procedure Laterality Date    APPENDECTOMY      BREAST SURGERY      Tags    CARPAL TUNNEL RELEASE Bilateral 09/2017    ,     CLOSURE, COLOSTOMY Left 1/16/2023    Procedure: CLOSURE, COLOSTOMY;  Surgeon: Manuel Javier, " MD;  Location: [facility] OR;  Service: General;  Laterality: Left;    COLONOSCOPY  04/2018    Normal  (Cornell Velazco)     COLOSTOMY Right 1/16/2023    Procedure: CREATION, COLOSTOMY;  Surgeon: Manuel Javier MD;  Location: [facility] OR;  Service: General;  Laterality: Right;    DILATION AND CURETTAGE OF UTERUS  1986    DUPUYTREN CONTRACTURE RELEASE Bilateral 09/2017    HYSTERECTOMY  1987    BEAU w/ appy, no BSO     INJECTION OF NEUROLYTIC AGENT AROUND LUMBAR SYMPATHETIC NERVE N/A 1/6/2022    Procedure: BLOCK, LUMBAR SYMPATHETIC;  Surgeon: Souleymane Rizo Jr., MD;  Location: [facility] PAIN MGT;  Service: Pain Management;  Laterality: N/A;  no pacemaker   pt is diabetic     INJECTION OF NEUROLYTIC AGENT AROUND LUMBAR SYMPATHETIC NERVE N/A 8/25/2022    Procedure: BLOCK, LUMBAR SYMPATHETIC;  Surgeon: Souleymane Rizo Jr., MD;  Location: [facility] PAIN [facility]GT;  Service: Pain Management;  Laterality: N/A;  diabetic     INSERTION OF TUNNELED CENTRAL VENOUS HEMODIALYSIS CATHETER Right 1/25/2023    Procedure: INSERTION, CATHETER, HEMODIALYSIS, DUAL LUMEN;  Surgeon: Manuel Javier MD;  Location: [facility];  Service: General;  Laterality: Right;    INSERTION, CATHETER, TUNNELED Left 1/28/2023    Procedure: Insertion,catheter,tunneled;  Surgeon: Watson Fontenot MD;  Location: [facility];  Service: General;  Laterality: Left;    LAPAROTOMY, EXPLORATORY N/A 1/14/2023    Procedure: LAPAROTOMY, EXPLORATORY;  Surgeon: Manuel Javier MD;  Location: [facility];  Service: General;  Laterality: N/A;    LAPAROTOMY, EXPLORATORY N/A 1/16/2023    Procedure: LAPAROTOMY, EXPLORATORY;  Surgeon: Manuel Javier MD;  Location: [facility];  Service: General;  Laterality: N/A;    LYMPHADENECTOMY Right 1/2/2024    Procedure: LYMPHADENECTOMY;  Surgeon: Tan Thomson MD;  Location: [address];  Service: Cardiothoracic;  Laterality: Right;    REMOVAL OF CATHETER Right 1/28/2023    Procedure: REMOVAL, CATHETER;  Surgeon: Watson Fontenot MD;  Location: [facility];   Service: General;  Laterality: Right;    REMOVAL, TUNNELED CATH Right 1/25/2023    Procedure: REMOVAL, TUNNELED CATH;  Surgeon: Manuel Javier MD;  Location: [facility];  Service: General;  Laterality: Right;    ROBOTIC BRONCHOSCOPY N/A 10/20/2023    Procedure: ROBOTIC BRONCHOSCOPY;  Surgeon: Mayda Monzon MD;  [facility];  Service: Pulmonary;  Laterality: N/A;    SHOULDER SURGERY  2009 & 2010    right rotator cuff    THORACOTOMY Right 1/2/2024    Procedure: THORACOTOMY;  Surgeon: Tan Thomson MD;  [facility];  Service: Cardiothoracic;  Laterality: Right;    THORACOTOMY, WITH INITIAL THERAPEUTIC WEDGE RESECTION OF LUNG Right 1/2/2024    Procedure: THORACOTOMY, WITH INITIAL THERAPEUTIC WEDGE RESECTION OF LUNG;  Surgeon: Tan Thomson MD;  [facility];  Service: Cardiothoracic;  Laterality: Right;    TONSILLECTOMY      UPPER GASTROINTESTINAL ENDOSCOPY  04/2018       Social History:  Tobacco Use: Low Risk  (1/3/2024)    Patient History     Smoking Tobacco Use: Never     Smokeless Tobacco Use: Never     Passive Exposure: Not on file       Alcohol Use: Not At Risk (11/22/2023)    AUDIT-C     Frequency of Alcohol Consumption: Never     Average Number of Drinks: Patient does not drink     Frequency of Binge Drinking: Never       OBJECTIVE:     Vital Signs Range:  BMI Readings from Last 1 Encounters:   01/12/24 25.29 kg/m²       Temp:  [36.4 °C (97.6 °F)-36.7 °C (98 °F)]   Pulse:  []   Resp:  [15-39]   BP: (132-190)/(66-78)   SpO2:  [93 %-97 %]   Arterial Line BP: (113-175)/(58-99)        Significant Labs:        Component Value Date/Time    WBC 30.10 (H) 01/16/2024 0255    HGB 7.7 (L) 01/16/2024 0255    HCT 23.2 (L) 01/16/2024 0255    HCT 27 (L) 01/12/2024 0718     01/16/2024 0255     01/16/2024 0255    K 3.8 01/16/2024 0255     01/16/2024 0255    CO2 22 (L) 01/16/2024 0255     (H) 01/16/2024 0255    BUN 8 01/16/2024 0255    CREATININE  0.7 01/16/2024 0255    MG 1.8 01/16/2024 0255    PHOS 2.4 (L) 01/16/2024 0255    CALCIUM 8.4 (L) 01/16/2024 0255    ALBUMIN 2.3 (L) 01/16/2024 0255    PROT 6.0 01/16/2024 0255    ALKPHOS 158 (H) 01/16/2024 0255    BILITOT 0.8 01/16/2024 0255    AST 21 01/16/2024 0255    ALT <5 (L) 01/16/2024 0255    INR 1.2 01/12/2024 0631    HGBA1C 4.9 11/15/2023 0802    HGBA1C 6.8 10/23/2019 0000        Please see Results Review for additional labs.     Diagnostic Studies: No relevant studies.    EKG:   Results for orders placed or performed during the hospital encounter of 01/02/24   EKG 12-lead    Collection Time: 01/13/24  7:44 AM    Narrative    Test Reason : I48.91,    Vent. Rate : 078 BPM     Atrial Rate : 078 BPM     P-R Int : 144 ms          QRS Dur : 076 ms      QT Int : 404 ms       P-R-T Axes : 053 030 052 degrees     QTc Int : 460 ms    Normal sinus rhythm  Low voltage QRS  Nonspecific T wave abnormality  Prolonged QT  Abnormal ECG  When compared with ECG of 12-JAN-2024 07:30,  Sinus rhythm has replaced Atrial fibrillation  QT has lengthened  Confirmed by Sai SAGASTUME MD ([id number]) on 1/13/2024 10:05:58 AM    Referred By: CR CARLOS           Confirmed By:Sai SAGASTUME MD       ECHO:  See subjective, if available.      ASSESSMENT/PLAN:                                                                                                               01/16/2024  Lily Green is a 73 y.o., female.      Pre-op Assessment    I have reviewed the Patient Summary Reports.     I have reviewed the Nursing Notes. I have reviewed the NPO Status.   I have reviewed the Medications.     Review of Systems  Cardiovascular:     Hypertension    Dysrhythmias (Bradycardia)                                    Musculoskeletal:         Spine Disorders: cervical Degenerative disease and Disc disease               Physical Exam  General: Well nourished, Cooperative, Alert and Oriented    Airway:  Mallampati: II   Mouth Opening: Normal  TM  Distance: Normal        Anesthesia Plan  Type of Anesthesia, risks & benefits discussed:    Anesthesia Type: Gen Natural Airway  Intra-op Monitoring Plan: Standard ASA Monitors  Induction:  IV  Informed Consent: Informed consent signed with the Patient and all parties understand the risks and agree with anesthesia plan.  All questions answered.   ASA Score: 3  Day of Surgery Review of History & Physical: H&P Update referred to the surgeon/provider.    Ready For Surgery From Anesthesia Perspective.     .

## 2024-01-16 NOTE — CONSULTS
Chest Tube Placement Consult Note  Interventional Radiology    Consult Requested By: Brooklyn Sapp MD  Reason for Consult: bilateral pleural effusions (loculated on R); eval for diagnostic thoracentesis    SUBJECTIVE:     Chief Complaint:  bilateral pleural effusions in setting of increasing O2 requirements and leukocytosis    History of Present Illness:  Lily Green is a 73 y.o. 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 Post op course notable for persistent afib rvr, SBO (now resolved), tachy-eulogio syndrome and asystolic arrest x3 s/p emergent TVP placement on 1/12. Pt now with increasing O2 requirements and worsening leukocytosis (WBC 30) in the setting of bilateral pleural effusions. Interventional Radiology has been consulted for eval of her pleural effusions with possible intervention including thora cs chest tube placement. Pt with persistent supplemental O2 needs and intermittent tachycardia prompting CT chest on 1/13/24 which revealed moderate-sized bilateral pleural effusions, increased from 01/08/2024 with the right sided effusion appearing multiloculated.The pt has not had a prior thoracentesis. The pt is not in respiratory distress, satting well on 3 L O2 via NC. The pt is hemodynamically stable. She is scheduled to undergo PPM placement today.    Scheduled Meds:   acetylcysteine 100 mg/ml (10%)  4 mL Nebulization TID WAKE    droNABinol  5 mg Oral BID    epoetin noble (PROCRIT) injection  4,000 Units Intravenous Every Mon, Wed, Fri    insulin detemir U-100  10 Units Subcutaneous Daily    LIDOcaine  1 patch Transdermal Q24H    meropenem (MERREM) IVPB  2 g Intravenous Q12H    metoclopramide  5 mg Intravenous Q6H    oxybutynin  5 mg Oral TID    pantoprazole  40 mg Oral Daily    polyethylene glycol  17 g Oral Daily    senna-docusate 8.6-50 mg  1 tablet Oral Daily      Continuous Infusions:   sodium chloride 0.9% Stopped (24 0418)    sodium chloride 0.9% 10 mL/hr at 24 1200     PRN Meds:sodium chloride 0.9%, albuterol sulfate, bisacodyL, dextrose 10%, dextrose 10%, glucagon (human recombinant), glucose, glucose, heparin (porcine), hydrALAZINE, insulin aspart U-100, ondansetron, sodium chloride 0.9%    Review of patient's allergies indicates:   Allergen Reactions    Crestor [rosuvastatin] Swelling    Gluten protein        Past Medical History:   Diagnosis Date    Anxiety     Celiac disease 2018    Celiac disease     Depression     Diabetes mellitus     Family history of breast cancer in mother      at age 68    Hyperlipidemia     Hypertension     Meniere disease     Meniere's disease, unspecified ear     Menopause     Peptic ulcer     Reflux esophagitis     Urinary tract infection     Vaginal infection     Vaginal Pap smear 2014    Pap/Hpv Negative      Past Surgical History:   Procedure Laterality Date    APPENDECTOMY      BREAST SURGERY      Tags    CARPAL TUNNEL RELEASE Bilateral 2017    ,     CLOSURE, COLOSTOMY Left 2023    Procedure: CLOSURE, COLOSTOMY;  Surgeon: Manuel Javier MD;  Location: [facility] OR;  Service: General;  Laterality: Left;    COLONOSCOPY  2018    Normal  (Cornell Velazco)     COLOSTOMY Right 2023    Procedure: CREATION, COLOSTOMY;  Surgeon: Manuel Javier MD;  Location: [facility] OR;  Service: General;  Laterality: Right;    DILATION AND CURETTAGE OF UTERUS  1986    DUPUYTREN CONTRACTURE RELEASE Bilateral 2017    HYSTERECTOMY      BEAU w/ appy, no BSO     INJECTION OF NEUROLYTIC AGENT AROUND LUMBAR SYMPATHETIC NERVE N/A 2022    Procedure: BLOCK, LUMBAR SYMPATHETIC;  Surgeon: Souleymane Rizo Jr., MD;  Location: [facility] PAIN MGT;  Service: Pain Management;  Laterality: N/A;  no pacemaker   pt is diabetic     INJECTION OF NEUROLYTIC AGENT AROUND LUMBAR SYMPATHETIC NERVE N/A 2022    Procedure: BLOCK,  LUMBAR SYMPATHETIC;  Surgeon: Souleymane Rizo Jr., MD;  Location: [facility] PAIN MGT;  Service: Pain Management;  Laterality: N/A;  diabetic     INSERTION OF TUNNELED CENTRAL VENOUS HEMODIALYSIS CATHETER Right 1/25/2023    Procedure: INSERTION, CATHETER, HEMODIALYSIS, DUAL LUMEN;  Surgeon: Manuel Javier MD;  Location: [facility] OR;  Service: General;  Laterality: Right;    INSERTION, CATHETER, TUNNELED Left 1/28/2023    Procedure: Insertion,catheter,tunneled;  Surgeon: Watson Fontenot MD;  Location: [facility] OR;  Service: General;  Laterality: Left;    LAPAROTOMY, EXPLORATORY N/A 1/14/2023    Procedure: LAPAROTOMY, EXPLORATORY;  Surgeon: Manuel Javier MD;  Location: [facility] OR;  Service: General;  Laterality: N/A;    LAPAROTOMY, EXPLORATORY N/A 1/16/2023    Procedure: LAPAROTOMY, EXPLORATORY;  Surgeon: Manuel Javier MD;  Location: [facility] OR;  Service: General;  Laterality: N/A;    LYMPHADENECTOMY Right 1/2/2024    Procedure: LYMPHADENECTOMY;  Surgeon: Tan Thomson MD;  Location: [facility] OR 2ND FLR;  Service: Cardiothoracic;  Laterality: Right;    PACEMAKER, TEMPORARY, TRANSVENOUS, PEDIATRIC Right 1/12/2024    Procedure: Pacemaker, Temporary, Transvenous;  Surgeon: Todd Carlisle MD;  Location: [facility] CATH LAB;  Service: Cardiology;  Laterality: Right;    REMOVAL OF CATHETER Right 1/28/2023    Procedure: REMOVAL, CATHETER;  Surgeon: Watson Fontenot MD;  Location: [facility] OR;  Service: General;  Laterality: Right;    REMOVAL, TUNNELED CATH Right 1/25/2023    Procedure: REMOVAL, TUNNELED CATH;  Surgeon: Manuel Javier MD;  Location: [facility] OR;  Service: General;  Laterality: Right;    ROBOTIC BRONCHOSCOPY N/A 10/20/2023    Procedure: ROBOTIC BRONCHOSCOPY;  Surgeon: Mayda Monzon MD;  Location: [facility] OR 2ND FLR;  Service: Pulmonary;  Laterality: N/A;    SHOULDER SURGERY  2009 & 2010    right rotator cuff    THORACOTOMY Right 1/2/2024    Procedure: THORACOTOMY;  Surgeon: Tan Thomson MD;  Location: [facility] OR  2ND FLR;  Service: Cardiothoracic;  Laterality: Right;    THORACOTOMY, WITH INITIAL THERAPEUTIC WEDGE RESECTION OF LUNG Right 1/2/2024    Procedure: THORACOTOMY, WITH INITIAL THERAPEUTIC WEDGE RESECTION OF LUNG;  Surgeon: Tan Thomson MD;  [facility];  Service: Cardiothoracic;  Laterality: Right;    TONSILLECTOMY      UPPER GASTROINTESTINAL ENDOSCOPY  04/2018     Family History   Problem Relation Age of Onset    Vision loss Father     Arthritis Father     Breast cancer Mother     Cancer Mother     Vision loss Mother     Hyperlipidemia Sister     Breast cancer Paternal Aunt     Cancer Paternal Aunt     Diabetes Paternal Grandfather     Vision loss Sister     Kidney disease Neg Hx      Social History     Tobacco Use    Smoking status: Never    Smokeless tobacco: Never   Substance Use Topics    Alcohol use: No    Drug use: Never       OBJECTIVE:     Vital Signs (Most Recent)  Temp: 97.6 °F (36.4 °C) (01/16/24 1100)  Pulse: 90 (01/16/24 1200)  Resp: (!) 24 (01/16/24 1200)  BP: (!) 145/67 (01/16/24 1100)  SpO2: (!) 91 % (01/16/24 1200)    Physical Exam:  Physical Exam  Vitals and nursing note reviewed.   Constitutional:       General: She is not in acute distress.     Appearance: She is ill-appearing.   HENT:      Head: Normocephalic and atraumatic.      Right Ear: External ear normal.      Left Ear: External ear normal.      Nose:      Comments: NC in place  Eyes:      Extraocular Movements: Extraocular movements intact.      Conjunctiva/sclera: Conjunctivae normal.      Pupils: Pupils are equal, round, and reactive to light.   Pulmonary:      Effort: Pulmonary effort is normal. No respiratory distress.      Comments: Increased WOB  Abdominal:      General: Abdomen is flat. There is no distension.   Skin:     General: Skin is warm and dry.      Coloration: Skin is not jaundiced.   Neurological:      General: No focal deficit present.      Mental Status: She is alert and oriented to person, place,  and time.   Psychiatric:         Mood and Affect: Mood normal.         Behavior: Behavior normal.         Thought Content: Thought content normal.         Judgment: Judgment normal.         Laboratory  I have reviewed all pertinent lab results within the past 24 hours.  CBC:   Recent Labs   Lab 01/16/24 0255   WBC 30.10*   RBC 2.70*   HGB 7.7*   HCT 23.2*      MCV 86   MCH 28.5   MCHC 33.2     BMP:   Recent Labs   Lab 01/16/24 0255   *      K 3.8      CO2 22*   BUN 8   CREATININE 0.7   CALCIUM 8.4*   MG 1.8     CMP:   Recent Labs   Lab 01/16/24 0255   *   CALCIUM 8.4*   ALBUMIN 2.3*   PROT 6.0      K 3.8   CO2 22*      BUN 8   CREATININE 0.7   ALKPHOS 158*   ALT <5*   AST 21   BILITOT 0.8     LFTs:   Recent Labs   Lab 01/16/24 0255   ALT <5*   AST 21   ALKPHOS 158*   BILITOT 0.8   PROT 6.0   ALBUMIN 2.3*     Coagulation:   Recent Labs   Lab 01/12/24  0631 01/12/24  1316 01/16/24 0255   LABPROT 12.7*  --   --    INR 1.2  --   --    APTT 62.5*   < > 22.8    < > = values in this interval not displayed.     Microbiology Results (last 7 days)       Procedure Component Value Units Date/Time    Blood culture [[id number]] Collected: 01/12/24 0411    Order Status: Completed Specimen: Blood from Peripheral, Foot, Left Updated: 01/16/24 0812     Blood Culture, Routine No Growth to date      No Growth to date      No Growth to date      No Growth to date      No Growth to date    Culture, Respiratory with Gram Stain [[id number]]  (Abnormal)  (Susceptibility) Collected: 01/11/24 2030    Order Status: Completed Specimen: Sputum, Induced Updated: 01/15/24 0925     Respiratory Culture No S aureus isolated.      KLEBSIELLA PNEUMONIAE  Many        ESCHERICHIA COLI  Many  Normal respiratory ramu also present       Gram Stain (Respiratory) <10 epithelial cells per low power field.     Gram Stain (Respiratory) Moderate WBC's     Gram Stain (Respiratory) Many Gram negative rods     Gram  Stain (Respiratory) Rare Gram positive cocci    Blood culture [[id number]]  (Abnormal) Collected: 01/11/24 1601    Order Status: Completed Specimen: Blood from Peripheral, Antecubital, Right Updated: 01/14/24 0812     Blood Culture, Routine Gram stain shahriar bottle: Gram positive cocci in clusters resembling Staph      Results called to and read back by: Gena UNGER RN 01/12/2024  18:43      COAGULASE-NEGATIVE STAPHYLOCOCCUS SPECIES  Organism is a probable contaminant      Culture, MRSA [[id number]] Collected: 01/11/24 1836    Order Status: Completed Specimen: MRSA source from Nares, Right Updated: 01/13/24 0844     MRSA Surveillance Screen No MRSA isolated    Rapid Organism ID by PCR (from Blood culture) [[id number]]  (Abnormal) Collected: 01/11/24 1601    Order Status: Completed Updated: 01/12/24 1956     Enterococcus faecalis Not Detected     Enterococcus faecium Not Detected     Listeria monocytogenes Not Detected     Staphylococcus spp. See species for ID     Staphylococcus aureus Not Detected     Staphylococcus epidermidis Detected     Staphylococcus lugdunensis Not Detected     Streptococcus species Not Detected     Streptococcus agalactiae Not Detected     Streptococcus pneumoniae Not Detected     Streptococcus pyogenes Not Detected     Acinetobacter calcoaceticus/baumannii complex Not Detected     Bacteroides fragilis Not Detected     Enterobacterales Not Detected     Enterobacter cloacae complex Not Detected     Escherichia coli Not Detected     Klebsiella aerogenes Not Detected     Klebsiella oxytoca Not Detected     Klebsiella pneumoniae group Not Detected     Proteus Not Detected     Salmonella sp Not Detected     Serratia marcescens Not Detected     Haemophilus influenzae Not Detected     Neisseria meningtidis Not Detected     Pseudomonas aeruginosa Not Detected     Stenotrophomonas maltophilia Not Detected     Candida albicans Not Detected     Candida auris Not Detected     Candida glabrata Not Detected      Raven krusei Not Detected     Candida parapsilosis Not Detected     Candida tropicalis Not Detected     Cryptococcus neoformans/gattii Not Detected     CTX-M (ESBL ) Test Not Applicable     IMP (Carbapenem resistant) Test Not Applicable     KPC resistance gene (Carbapenem resistant) Test Not Applicable     mcr-1  Test Not Applicable     mec A/C  Not Detected     mec A/C and MREJ (MRSA) gene Test Not Applicable     NDM (Carbapenem resistant) Test Not Applicable     OXA-48-like (Carbapenem resistant) Test Not Applicable     van A/B (VRE gene) Test Not Applicable     VIM (Carbapenem resistant) Test Not Applicable            ASA/Mallampati  ASA: 2  Mallampati: 2    Imaging:  Recent imaging studies including CT chest on 1/13/24 which was independently reviewed by Barrie Becker MD.    EXAMINATION:  CT CHEST WITH CONTRAST     CLINICAL HISTORY:  pleural effusion, right lung evaluation;     TECHNIQUE:  Low dose axial images, sagittal and coronal reformations were obtained from the thoracic inlet to the lung bases following the IV administration of 75 mL of Omnipaque 350.     COMPARISON:  AP chest x-ray 01/13/2024.     CT abdomen pelvis 01/08/2024     Noncontrast chest CT 10/18/2023     FINDINGS:  Artifacts related to motion and/or beam hardening degrade numerous images.     Thyroid: Diminutive, possibly on the basis of atrophy.     Airways: The trachea and mainstem bronchi are patent.  There are some occluded bronchi in both lower pulmonary lobes, possibly on the basis of mucous plugging.  In the right middle lobe, there is occlusion of the medial segment bronchus, possibly postoperative.     Esophagus: Gas-filled, mildly distended upper thoracic esophagus.  The previously present orogastric or nasogastric tube has been removed.  This limits delineation of the distal esophagus.  There remains some soft tissue fullness in the region of the distal esophagus thoracic esophageal recess.  Gas projecting within the  soft tissue fullness could be related to the esophageal lumen or to incomplete resolution of the previously described azygoesophageal recess gas-containing collection from the 01/08/2024 abdominopelvic CT report.     Mediastinum/barbie: Mediastinal surgical clips.  There is at least 1 discretely visualized, frankly enlarged mediastinal lymph node, precarinal, 2.3 x 1.7 cm.  There is poorly defined subcarinal fullness, also suspicious for lymphadenopathy.     There remain some mediastinal lymph node calcifications, likely granulomatous.     There is increased attenuation of portions of the mediastinal fat, possibly related to mediastinal edema; other infiltrative mediastinal pathology is not excluded.     Thoracic aorta: Normal course and caliber.  Mild atherosclerotic changes.  No acute CT abnormality.     Pulmonary arteries: Although this scan was not obtained with CT technique, the central portions of the pulmonary arterial tree are reasonably well-enhanced and demonstrate no filling defects suspicious for large central pulmonary thromboemboli.  Abrupt truncation of the right middle lobe medial segment pulmonary artery branch, possibly postoperative.     Left brachiocephalic, subclavian, and proximal axillary venous stent.  On two-dimensional images, the stent appears grossly patent; on the 3D MIP images, a nonocclusive intraluminal filling defect is suggested but is favored to represent artifact of 3D processing.     Right jugular approach central venous catheter, terminating in the region of the superior cavoatrial junction.  Within the SVC, there is very little contrast enhancement seen about the course of the right jugular approach central venous catheter.  In this patient is IV contrast was injected via the right upper extremity, there is opacification of several collateral venous channels in the chest, including bright enhancement of the azygous arch that likely represents anterograde flow of contrast from the  peripheral veins through the azygous into the SVC.     Heart/pericardium: Normal sized heart chambers.  A partially visualized trans venous cardiac pacing lead, likely placed from a femoral approach, terminates in the region the right ventricular apex.  Circumferential moderate sized pericardial effusion up to approximately 16 mm in depth.  The artifacts likely contribute to the somewhat broad range of attenuation measurements of this fluid, 22-45 HU.     Lungs: Bilateral lower lobe atelectasis.  Atelectasis or partial atelectasis of the right middle lobe medial segment, with suspected underlying tumor.  Punctate calcification, likely a granuloma, in the left lower lobe.     Pleura: Moderate quantity of pleural fluid bilaterally, some of it possibly loculated (especially on the right, where there are several loculations suggested within the pleural fissures and in the apical region).  Underlying low-attenuation pleural masses are not excluded.  No pneumothorax.     Upper abdomen: Radiopaque material layers dependently within the lumen of the partially visualized stomach, possibly representing oral contrast.     The visualized portion of the stomach is only partially distended, limiting CT assessment for abnormal mural mucosal thickening (as could be seen with gastritis or other gastric pathology.).  There is some reflux of contrast from the right atrium into the IVC and hepatic veins.  Visualized upper portions of the liver and spleen demonstrate no discrete masses.     Body wall: Incompletely visualized asymmetrically positioned breasts; this limits CT assessment for asymmetric breast densities.  No discrete axillary or supraclavicular lymphadenopathy identified.     Musculoskeletal: Scattered degenerative changes.  Diffuse osteopenia.  No acute fracture deformity.  Some of the collateral venous flow involves spinal epidural veins.     Impression:     Moderate-sized bilateral pleural effusions, increased from  01/08/2024; likely multiply-loculated on the right.  Underlying pleural infection or pleural neoplasm not excluded.  Consider diagnostic thoracentesis, especially on the right.     Bilateral lower lobe atelectasis.     Partial atelectasis of the right middle lobe, possibly postoperative.     Other underlying pathology not excluded in the atelectatic portions of the lungs.  Correlate clinically and with follow-up chest CT within approximately 3-6 months.     Circumferential moderate-sized pericardial effusion, approximately 1.6 cm in depth; substantially increased from 01/08/2024.  Allowing for artifacts, the pericardial fluid appears to be hyperattenuating to water.  DDX includes malignant pleural effusion, infected pericardial fluid, or subacute blood products.  Correlate clinically, including with echocardiography.     Mediastinal surgical clips.     Mediastinal lymphadenopathy, new since 10/18/2023, reactive versus metastatic.     Previously described azygoesophageal recess collection is poorly delineated but appears substantially decreased in size since 2024.  Its residua is likely compressing or otherwise involving the thoracic esophagus; above this level, there is mild gaseous distention of the upper thoracic esophagus.  Correlate clinically, including for difficulty swallowing.     Probable SVC stenosis.     Additional observations as detailed in the body of the report.     This report was flagged in Epic as abnormal.        Electronically signed by: Samuel Bustamante  Date:                                            01/13/2024  Time:                                           21:48     ASSESSMENT/PLAN:     Assessment:  73 y.o. 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 CT chest reviewed with staff. Pt's R sided  effusion is larger than the L and appears loculated with 2 main loculations that do not communicate, one located at the base of the R lung and one located in the RUL. Recommend placing a chest tube into the R pleural loculation located at the base of the R lung, however this will not address the RUL loculation. RUL collection also will not be accessible if PPM is placed in the R anterior chest wall. Explained these stipulations in the primary team who would like to proceed with chest tube placement into the R sided loculation located at the base of the R lung. Could proceed with thora vs chest tube to address the smaller, non loculated L effusion on a separate day if still needed; cannot address both R and L effusions on the same day. The procedure was discussed in great detail with the patient including thorough explanations of the potential risks and benefits of chest tube placement. Risks include infection, hemorrhage, damage to surrounding structures such as the lung, catheter malfunction, inability to remove catheter, pneumothorax and catheter dislodgment. The patient is a candidate for CT guided right chest tube placement into the R lower lung base loculation under moderate sedation. Will schedule pt for 1/17/24 as she is undergoing PPM placement today. Plan discussed with ordering physician.The pt verbalized understanding of the plan and would like to proceed.    Plan:  Will proceed with CT guided right chest tube placement into the R lower lung base loculation under moderate sedation on 1/17/24.   Unable to address L pleural effusion on the same day, please reach out to IR if L thora vs chest tube is still needed  Primary team to place orders for labs/cultures to be drawn on the pleural fluid drained during the procedure  Please keep pt NPO starting at midnight on 1/17/24.   Anticoagulation history reviewed.   Coagulation labs reviewed.  Thank you for the consult. Please contact with questions via Epic  secure chat or [employer] Link    Mackenzie Campbell PA-C  Interventional Radiology  Spectra: [phone]

## 2024-01-16 NOTE — NURSING
Pt arrives to SICU from EP lab w/ anesthesia and EP RN at bedside. Dr. Sapp notified and at bedside. Pt connected to bedside monitor, bradycardic episode HR 46 x 30sec and symptomatic. HR returned to 60s, vss at this time. Orders received for bedrest x6 hrs, at 6pm HOB 30 degrees, @ 8pm bedrest ends.

## 2024-01-16 NOTE — SUBJECTIVE & OBJECTIVE
Interval History: Continues with paroxysmal atrial fibrillation with bradycardic episodes requiring pacing at 40 bpm. Plan for cardiac pacemaker. SLED overnight. Net +63. On 3L NC.     Review of patient's allergies indicates:   Allergen Reactions    Crestor [rosuvastatin] Swelling    Gluten protein      Current Facility-Administered Medications   Medication Frequency    0.9%  NaCl infusion (CRRT USE ONLY) Continuous    0.9%  NaCl infusion PRN    0.9%  NaCl infusion Continuous    acetylcysteine 100 mg/ml (10%) solution 4 mL TID WAKE    albuterol sulfate nebulizer solution 2.5 mg Q4H PRN    bisacodyL suppository 10 mg Daily PRN    dextrose 10% bolus 125 mL 125 mL PRN    dextrose 10% bolus 250 mL 250 mL PRN    droNABinol capsule 5 mg BID    epoetin noble injection 4,000 Units Every Mon, Wed, Fri    glucagon (human recombinant) injection 1 mg PRN    glucose chewable tablet 16 g PRN    glucose chewable tablet 24 g PRN    heparin (porcine) 2,000 Units in sodium chloride 0.9% 1,000 mL PRN    heparin (porcine) injection 1,000 Units PRN    heparin infusion 1,000 units/500 ml in 0.9% NaCl (on sterile field) PRN    hydrALAZINE injection 10 mg Q6H PRN    insulin aspart U-100 pen 0-10 Units Q4H PRN    insulin detemir U-100 (Levemir) pen 10 Units Daily    iodixanoL (VISIPAQUE 320) injection PRN    LIDOcaine 5 % patch 1 patch Q24H    LIDOcaine HCL 20 mg/ml (2%) injection PRN    meropenem (MERREM) 2 g in sodium chloride 0.9% 100 mL IVPB Q12H    metoclopramide injection 5 mg Q6H    ondansetron injection 8 mg Q8H PRN    oxybutynin tablet 5 mg TID    pantoprazole EC tablet 40 mg Daily    polyethylene glycol packet 17 g Daily    senna-docusate 8.6-50 mg per tablet 1 tablet Daily    sodium chloride 0.9% flush 10 mL PRN     Facility-Administered Medications Ordered in Other Encounters   Medication Frequency    dexmedeTOMIDine injection Continuous PRN    fentaNYL 50 mcg/mL injection PRN    heparin (porcine) injection PRN    midazolam  injection PRN    sodium chloride 0.9% infusion Continuous PRN    vasopressin injection PRN       Objective:     Vital Signs (Most Recent):  Temp: 97.6 °F (36.4 °C) (01/16/24 1100)  Pulse: 90 (01/16/24 1200)  Resp: (!) 24 (01/16/24 1200)  BP: (!) 145/67 (01/16/24 1100)  SpO2: (!) 91 % (01/16/24 1200) Vital Signs (24h Range):  Temp:  [97.4 °F (36.3 °C)-98 °F (36.7 °C)] 97.6 °F (36.4 °C)  Pulse:  [] 90  Resp:  [15-43] 24  SpO2:  [81 %-100 %] 91 %  BP: (123-190)/(61-78) 145/67  Arterial Line BP: (113-175)/(51-99) 139/68     Weight: 68.9 kg (152 lb) (01/12/24 0900)  Body mass index is 25.29 kg/m².  Body surface area is 1.78 meters squared.    I/O last 3 completed shifts:  In: 6048.5 [P.O.:480; I.V.:4938.4; IV Piggyback:630.1]  Out: 6839 [Other:6839]     Physical Exam  Vitals and nursing note reviewed.   Constitutional:       Appearance: She is well-developed. She is ill-appearing.   HENT:      Head: Normocephalic and atraumatic.      Right Ear: External ear normal.      Left Ear: External ear normal.   Eyes:      General: No scleral icterus.        Right eye: No discharge.         Left eye: No discharge.      Conjunctiva/sclera: Conjunctivae normal.   Cardiovascular:      Rate and Rhythm: Normal rate and regular rhythm.      Heart sounds: Normal heart sounds. No murmur heard.     No friction rub. No gallop.   Pulmonary:      Effort: Pulmonary effort is normal. No respiratory distress.      Breath sounds: No stridor. Examination of the right-lower field reveals decreased breath sounds. Decreased breath sounds present. No wheezing or rales.   Abdominal:      General: Bowel sounds are normal.   Musculoskeletal:         General: No tenderness.      Right lower leg: Edema present.      Left lower leg: Edema present.   Skin:     General: Skin is warm and dry.   Neurological:      Mental Status: She is alert and oriented to person, place, and time.          Significant Labs:  CBC:   Recent Labs   Lab 01/16/24  0255   WBC  30.10*   RBC 2.70*   HGB 7.7*   HCT 23.2*      MCV 86   MCH 28.5   MCHC 33.2     CMP:   Recent Labs   Lab 01/16/24  0255   *   CALCIUM 8.4*   ALBUMIN 2.3*   PROT 6.0      K 3.8   CO2 22*      BUN 8   CREATININE 0.7   ALKPHOS 158*   ALT <5*   AST 21   BILITOT 0.8     All labs within the past 24 hours have been reviewed.

## 2024-01-16 NOTE — PLAN OF CARE
SICU PLAN OF CARE NOTE    Dx: Malignant carcinoid tumor of bronchus      Vital Signs (last 12 hours):   Temp:  [97.6 °F (36.4 °C)-97.9 °F (36.6 °C)]   Pulse:  []   Resp:  [15-35]   BP: (123-190)/(62-78)   SpO2:  [89 %-100 %]   Arterial Line BP: (113-175)/(51-99)      Neuro: AAO x4, Follows Commands, and Moves All Extremities    Cardiac: NSR (intermittent episodes of bradycardia), pulses palpable    Respiratory: Nasal Cannula 2 L    Urine Output: Anuric 0 cc/shift (dialysis pt)    Diet: NPO (at midnight for procedure)     Labs/Accuchecks: AM labs, Accuchecks QID, AM aPTT    Skin: Foam dressings in use, pt turned appropriately; mild mottling present in hands and knees; incision site healing and free of infection    Shift Events:  intermittent eulogio episodes x 12; pacer undersensing; surgical preparation

## 2024-01-17 ENCOUNTER — ANESTHESIA EVENT (OUTPATIENT)
Dept: INTERVENTIONAL RADIOLOGY/VASCULAR | Facility: HOSPITAL | Age: 74
DRG: 163 | End: 2024-01-17
Payer: MEDICARE

## 2024-01-17 LAB
ACID FAST MOD KINY STN SPEC: NORMAL
ALBUMIN SERPL BCP-MCNC: 2 G/DL (ref 3.5–5.2)
ALBUMIN SERPL BCP-MCNC: 2 G/DL (ref 3.5–5.2)
ALBUMIN SERPL BCP-MCNC: 2.3 G/DL (ref 3.5–5.2)
ALBUMIN SERPL BCP-MCNC: 2.4 G/DL (ref 3.5–5.2)
ALP SERPL-CCNC: 158 U/L (ref 55–135)
ALT SERPL W/O P-5'-P-CCNC: 7 U/L (ref 10–44)
ANION GAP SERPL CALC-SCNC: 6 MMOL/L (ref 8–16)
ANION GAP SERPL CALC-SCNC: 6 MMOL/L (ref 8–16)
ANION GAP SERPL CALC-SCNC: 9 MMOL/L (ref 8–16)
ANION GAP SERPL CALC-SCNC: 9 MMOL/L (ref 8–16)
ANISOCYTOSIS BLD QL SMEAR: SLIGHT
APTT PPP: 23.9 SEC (ref 21–32)
AST SERPL-CCNC: 20 U/L (ref 10–40)
BACTERIA BLD CULT: NORMAL
BASO STIPL BLD QL SMEAR: ABNORMAL
BASOPHILS # BLD AUTO: ABNORMAL K/UL (ref 0–0.2)
BASOPHILS NFR BLD: 0 % (ref 0–1.9)
BILIRUB SERPL-MCNC: 0.7 MG/DL (ref 0.1–1)
BUN SERPL-MCNC: 19 MG/DL (ref 8–23)
BUN SERPL-MCNC: 19 MG/DL (ref 8–23)
BUN SERPL-MCNC: 25 MG/DL (ref 8–23)
BUN SERPL-MCNC: 31 MG/DL (ref 8–23)
CALCIUM SERPL-MCNC: 8 MG/DL (ref 8.7–10.5)
CALCIUM SERPL-MCNC: 8 MG/DL (ref 8.7–10.5)
CALCIUM SERPL-MCNC: 8.4 MG/DL (ref 8.7–10.5)
CALCIUM SERPL-MCNC: 8.7 MG/DL (ref 8.7–10.5)
CHLORIDE SERPL-SCNC: 104 MMOL/L (ref 95–110)
CHLORIDE SERPL-SCNC: 104 MMOL/L (ref 95–110)
CHLORIDE SERPL-SCNC: 106 MMOL/L (ref 95–110)
CHLORIDE SERPL-SCNC: 106 MMOL/L (ref 95–110)
CO2 SERPL-SCNC: 21 MMOL/L (ref 23–29)
CO2 SERPL-SCNC: 22 MMOL/L (ref 23–29)
CO2 SERPL-SCNC: 23 MMOL/L (ref 23–29)
CO2 SERPL-SCNC: 23 MMOL/L (ref 23–29)
CREAT SERPL-MCNC: 1.4 MG/DL (ref 0.5–1.4)
CREAT SERPL-MCNC: 1.4 MG/DL (ref 0.5–1.4)
CREAT SERPL-MCNC: 1.9 MG/DL (ref 0.5–1.4)
CREAT SERPL-MCNC: 2.5 MG/DL (ref 0.5–1.4)
DIFFERENTIAL METHOD BLD: ABNORMAL
EOSINOPHIL # BLD AUTO: ABNORMAL K/UL (ref 0–0.5)
EOSINOPHIL NFR BLD: 0 % (ref 0–8)
ERYTHROCYTE [DISTWIDTH] IN BLOOD BY AUTOMATED COUNT: 19.9 % (ref 11.5–14.5)
EST. GFR  (NO RACE VARIABLE): 19.8 ML/MIN/1.73 M^2
EST. GFR  (NO RACE VARIABLE): 27.5 ML/MIN/1.73 M^2
EST. GFR  (NO RACE VARIABLE): 39.7 ML/MIN/1.73 M^2
EST. GFR  (NO RACE VARIABLE): 39.7 ML/MIN/1.73 M^2
GLUCOSE SERPL-MCNC: 107 MG/DL (ref 70–110)
GLUCOSE SERPL-MCNC: 107 MG/DL (ref 70–110)
GLUCOSE SERPL-MCNC: 143 MG/DL (ref 70–110)
GLUCOSE SERPL-MCNC: 267 MG/DL (ref 70–110)
GRAM STN SPEC: NORMAL
GRAM STN SPEC: NORMAL
HCT VFR BLD AUTO: 23.2 % (ref 37–48.5)
HGB BLD-MCNC: 7.6 G/DL (ref 12–16)
HYPOCHROMIA BLD QL SMEAR: ABNORMAL
IMM GRANULOCYTES # BLD AUTO: ABNORMAL K/UL (ref 0–0.04)
IMM GRANULOCYTES NFR BLD AUTO: ABNORMAL % (ref 0–0.5)
KOH PREP SPEC: NORMAL
LYMPHOCYTES # BLD AUTO: ABNORMAL K/UL (ref 1–4.8)
LYMPHOCYTES NFR BLD: 9 % (ref 18–48)
MAGNESIUM SERPL-MCNC: 2.1 MG/DL (ref 1.6–2.6)
MAGNESIUM SERPL-MCNC: 2.1 MG/DL (ref 1.6–2.6)
MAGNESIUM SERPL-MCNC: 2.4 MG/DL (ref 1.6–2.6)
MCH RBC QN AUTO: 28.9 PG (ref 27–31)
MCHC RBC AUTO-ENTMCNC: 32.8 G/DL (ref 32–36)
MCV RBC AUTO: 88 FL (ref 82–98)
METAMYELOCYTES NFR BLD MANUAL: 2 %
MONOCYTES # BLD AUTO: ABNORMAL K/UL (ref 0.3–1)
MONOCYTES NFR BLD: 4 % (ref 4–15)
MYELOCYTES NFR BLD MANUAL: 3 %
NEUTROPHILS NFR BLD: 82 % (ref 38–73)
NRBC BLD-RTO: 1 /100 WBC
OVALOCYTES BLD QL SMEAR: ABNORMAL
PHOSPHATE SERPL-MCNC: 3.2 MG/DL (ref 2.7–4.5)
PHOSPHATE SERPL-MCNC: 3.2 MG/DL (ref 2.7–4.5)
PHOSPHATE SERPL-MCNC: 5.2 MG/DL (ref 2.7–4.5)
PHOSPHATE SERPL-MCNC: 5.6 MG/DL (ref 2.7–4.5)
PLATELET # BLD AUTO: 256 K/UL (ref 150–450)
PLATELET BLD QL SMEAR: ABNORMAL
PMV BLD AUTO: 11.5 FL (ref 9.2–12.9)
POCT GLUCOSE: 100 MG/DL (ref 70–110)
POCT GLUCOSE: 111 MG/DL (ref 70–110)
POCT GLUCOSE: 122 MG/DL (ref 70–110)
POCT GLUCOSE: 172 MG/DL (ref 70–110)
POCT GLUCOSE: 212 MG/DL (ref 70–110)
POCT GLUCOSE: 240 MG/DL (ref 70–110)
POCT GLUCOSE: 255 MG/DL (ref 70–110)
POCT GLUCOSE: 280 MG/DL (ref 70–110)
POCT GLUCOSE: 288 MG/DL (ref 70–110)
POIKILOCYTOSIS BLD QL SMEAR: SLIGHT
POLYCHROMASIA BLD QL SMEAR: ABNORMAL
POTASSIUM SERPL-SCNC: 4.4 MMOL/L (ref 3.5–5.1)
POTASSIUM SERPL-SCNC: 4.4 MMOL/L (ref 3.5–5.1)
POTASSIUM SERPL-SCNC: 4.5 MMOL/L (ref 3.5–5.1)
POTASSIUM SERPL-SCNC: 4.8 MMOL/L (ref 3.5–5.1)
PROT SERPL-MCNC: 5.8 G/DL (ref 6–8.4)
RBC # BLD AUTO: 2.63 M/UL (ref 4–5.4)
SODIUM SERPL-SCNC: 134 MMOL/L (ref 136–145)
SODIUM SERPL-SCNC: 135 MMOL/L (ref 136–145)
TARGETS BLD QL SMEAR: ABNORMAL
WBC # BLD AUTO: 23.53 K/UL (ref 3.9–12.7)

## 2024-01-17 PROCEDURE — 25000242 PHARM REV CODE 250 ALT 637 W/ HCPCS

## 2024-01-17 PROCEDURE — 87070 CULTURE OTHR SPECIMN AEROBIC: CPT

## 2024-01-17 PROCEDURE — 87102 FUNGUS ISOLATION CULTURE: CPT

## 2024-01-17 PROCEDURE — 87205 SMEAR GRAM STAIN: CPT

## 2024-01-17 PROCEDURE — 99223 1ST HOSP IP/OBS HIGH 75: CPT | Mod: 24,FS,GC, | Performed by: STUDENT IN AN ORGANIZED HEALTH CARE EDUCATION/TRAINING PROGRAM

## 2024-01-17 PROCEDURE — 11000001 HC ACUTE MED/SURG PRIVATE ROOM

## 2024-01-17 PROCEDURE — D9220A PRA ANESTHESIA: Mod: CRNA,,, | Performed by: NURSE ANESTHETIST, CERTIFIED REGISTERED

## 2024-01-17 PROCEDURE — 63600175 PHARM REV CODE 636 W HCPCS

## 2024-01-17 PROCEDURE — 85730 THROMBOPLASTIN TIME PARTIAL: CPT

## 2024-01-17 PROCEDURE — 87206 SMEAR FLUORESCENT/ACID STAI: CPT

## 2024-01-17 PROCEDURE — 83735 ASSAY OF MAGNESIUM: CPT | Performed by: SURGERY

## 2024-01-17 PROCEDURE — 27200667 HC PACEMAKER, TEMPORARY MONITORING, PER SHIFT

## 2024-01-17 PROCEDURE — 63600175 PHARM REV CODE 636 W HCPCS: Performed by: NURSE ANESTHETIST, CERTIFIED REGISTERED

## 2024-01-17 PROCEDURE — 88305 TISSUE EXAM BY PATHOLOGIST: CPT | Performed by: STUDENT IN AN ORGANIZED HEALTH CARE EDUCATION/TRAINING PROGRAM

## 2024-01-17 PROCEDURE — 25000003 PHARM REV CODE 250: Performed by: SURGERY

## 2024-01-17 PROCEDURE — 99499 UNLISTED E&M SERVICE: CPT | Mod: GC,,, | Performed by: ANESTHESIOLOGY

## 2024-01-17 PROCEDURE — 63600175 PHARM REV CODE 636 W HCPCS: Mod: JZ | Performed by: NURSE PRACTITIONER

## 2024-01-17 PROCEDURE — 90945 DIALYSIS ONE EVALUATION: CPT

## 2024-01-17 PROCEDURE — 25000003 PHARM REV CODE 250: Performed by: NURSE ANESTHETIST, CERTIFIED REGISTERED

## 2024-01-17 PROCEDURE — 25000003 PHARM REV CODE 250: Performed by: NURSE PRACTITIONER

## 2024-01-17 PROCEDURE — D9220A PRA ANESTHESIA: Mod: ANES,,, | Performed by: ANESTHESIOLOGY

## 2024-01-17 PROCEDURE — 84100 ASSAY OF PHOSPHORUS: CPT | Performed by: SURGERY

## 2024-01-17 PROCEDURE — 88305 TISSUE EXAM BY PATHOLOGIST: CPT | Mod: 26,,, | Performed by: STUDENT IN AN ORGANIZED HEALTH CARE EDUCATION/TRAINING PROGRAM

## 2024-01-17 PROCEDURE — 63600175 PHARM REV CODE 636 W HCPCS: Performed by: STUDENT IN AN ORGANIZED HEALTH CARE EDUCATION/TRAINING PROGRAM

## 2024-01-17 PROCEDURE — 94664 DEMO&/EVAL PT USE INHALER: CPT

## 2024-01-17 PROCEDURE — 80069 RENAL FUNCTION PANEL: CPT | Performed by: INTERNAL MEDICINE

## 2024-01-17 PROCEDURE — 80053 COMPREHEN METABOLIC PANEL: CPT | Performed by: SURGERY

## 2024-01-17 PROCEDURE — 83735 ASSAY OF MAGNESIUM: CPT | Mod: 91 | Performed by: INTERNAL MEDICINE

## 2024-01-17 PROCEDURE — 85007 BL SMEAR W/DIFF WBC COUNT: CPT | Performed by: SURGERY

## 2024-01-17 PROCEDURE — 87206 SMEAR FLUORESCENT/ACID STAI: CPT | Mod: 91

## 2024-01-17 PROCEDURE — 27000646 HC AEROBIKA DEVICE

## 2024-01-17 PROCEDURE — 94640 AIRWAY INHALATION TREATMENT: CPT

## 2024-01-17 PROCEDURE — 87210 SMEAR WET MOUNT SALINE/INK: CPT

## 2024-01-17 PROCEDURE — 99233 SBSQ HOSP IP/OBS HIGH 50: CPT | Mod: FS,,, | Performed by: INTERNAL MEDICINE

## 2024-01-17 PROCEDURE — 87116 MYCOBACTERIA CULTURE: CPT

## 2024-01-17 PROCEDURE — 94761 N-INVAS EAR/PLS OXIMETRY MLT: CPT

## 2024-01-17 PROCEDURE — 0W9930Z DRAINAGE OF RIGHT PLEURAL CAVITY WITH DRAINAGE DEVICE, PERCUTANEOUS APPROACH: ICD-10-PCS | Performed by: STUDENT IN AN ORGANIZED HEALTH CARE EDUCATION/TRAINING PROGRAM

## 2024-01-17 PROCEDURE — 88112 CYTOPATH CELL ENHANCE TECH: CPT | Mod: 26,,, | Performed by: STUDENT IN AN ORGANIZED HEALTH CARE EDUCATION/TRAINING PROGRAM

## 2024-01-17 PROCEDURE — 25000003 PHARM REV CODE 250: Performed by: STUDENT IN AN ORGANIZED HEALTH CARE EDUCATION/TRAINING PROGRAM

## 2024-01-17 PROCEDURE — 97530 THERAPEUTIC ACTIVITIES: CPT

## 2024-01-17 PROCEDURE — 99900035 HC TECH TIME PER 15 MIN (STAT)

## 2024-01-17 PROCEDURE — 27000221 HC OXYGEN, UP TO 24 HOURS

## 2024-01-17 PROCEDURE — 85027 COMPLETE CBC AUTOMATED: CPT | Performed by: SURGERY

## 2024-01-17 PROCEDURE — 88112 CYTOPATH CELL ENHANCE TECH: CPT | Performed by: STUDENT IN AN ORGANIZED HEALTH CARE EDUCATION/TRAINING PROGRAM

## 2024-01-17 PROCEDURE — 99233 SBSQ HOSP IP/OBS HIGH 50: CPT | Mod: GC,,, | Performed by: INTERNAL MEDICINE

## 2024-01-17 PROCEDURE — 25000003 PHARM REV CODE 250

## 2024-01-17 RX ORDER — MIDAZOLAM HYDROCHLORIDE 1 MG/ML
INJECTION INTRAMUSCULAR; INTRAVENOUS
Status: DISCONTINUED | OUTPATIENT
Start: 2024-01-17 | End: 2024-01-17

## 2024-01-17 RX ORDER — PROPOFOL 10 MG/ML
INJECTION, EMULSION INTRAVENOUS CONTINUOUS PRN
Status: DISCONTINUED | OUTPATIENT
Start: 2024-01-17 | End: 2024-01-17

## 2024-01-17 RX ORDER — LIDOCAINE HYDROCHLORIDE 10 MG/ML
INJECTION INFILTRATION; PERINEURAL
Status: COMPLETED | OUTPATIENT
Start: 2024-01-17 | End: 2024-01-17

## 2024-01-17 RX ORDER — SODIUM CHLORIDE 9 MG/ML
INJECTION, SOLUTION INTRAVENOUS ONCE
Status: DISCONTINUED | OUTPATIENT
Start: 2024-01-17 | End: 2024-01-17

## 2024-01-17 RX ORDER — SODIUM CHLORIDE, SODIUM LACTATE, POTASSIUM CHLORIDE, CALCIUM CHLORIDE 600; 310; 30; 20 MG/100ML; MG/100ML; MG/100ML; MG/100ML
INJECTION, SOLUTION INTRAVENOUS CONTINUOUS PRN
Status: DISCONTINUED | OUTPATIENT
Start: 2024-01-17 | End: 2024-01-17

## 2024-01-17 RX ORDER — MAGNESIUM SULFATE HEPTAHYDRATE 40 MG/ML
2 INJECTION, SOLUTION INTRAVENOUS
Status: DISCONTINUED | OUTPATIENT
Start: 2024-01-17 | End: 2024-01-18

## 2024-01-17 RX ORDER — FENTANYL CITRATE 50 UG/ML
INJECTION, SOLUTION INTRAMUSCULAR; INTRAVENOUS
Status: DISCONTINUED | OUTPATIENT
Start: 2024-01-17 | End: 2024-01-17

## 2024-01-17 RX ORDER — LIDOCAINE HYDROCHLORIDE 20 MG/ML
INJECTION INTRAVENOUS
Status: DISCONTINUED | OUTPATIENT
Start: 2024-01-17 | End: 2024-01-17

## 2024-01-17 RX ORDER — HYDROCODONE BITARTRATE AND ACETAMINOPHEN 500; 5 MG/1; MG/1
TABLET ORAL CONTINUOUS
Status: DISCONTINUED | OUTPATIENT
Start: 2024-01-17 | End: 2024-01-18

## 2024-01-17 RX ADMIN — INSULIN DETEMIR 10 UNITS: 100 INJECTION, SOLUTION SUBCUTANEOUS at 08:01

## 2024-01-17 RX ADMIN — FENTANYL CITRATE 25 MCG: 50 INJECTION, SOLUTION INTRAMUSCULAR; INTRAVENOUS at 02:01

## 2024-01-17 RX ADMIN — INSULIN ASPART 4 UNITS: 100 INJECTION, SOLUTION INTRAVENOUS; SUBCUTANEOUS at 12:01

## 2024-01-17 RX ADMIN — INSULIN ASPART 6 UNITS: 100 INJECTION, SOLUTION INTRAVENOUS; SUBCUTANEOUS at 04:01

## 2024-01-17 RX ADMIN — OXYBUTYNIN CHLORIDE 5 MG: 5 TABLET ORAL at 10:01

## 2024-01-17 RX ADMIN — ACETYLCYSTEINE 4 ML: 100 INHALANT RESPIRATORY (INHALATION) at 08:01

## 2024-01-17 RX ADMIN — LIDOCAINE 5% 1 PATCH: 700 PATCH TOPICAL at 08:01

## 2024-01-17 RX ADMIN — PANTOPRAZOLE SODIUM 40 MG: 40 TABLET, DELAYED RELEASE ORAL at 08:01

## 2024-01-17 RX ADMIN — OXYBUTYNIN CHLORIDE 5 MG: 5 TABLET ORAL at 08:01

## 2024-01-17 RX ADMIN — LIDOCAINE HYDROCHLORIDE 10 ML: 10 INJECTION, SOLUTION INFILTRATION; PERINEURAL at 02:01

## 2024-01-17 RX ADMIN — INSULIN ASPART 4 UNITS: 100 INJECTION, SOLUTION INTRAVENOUS; SUBCUTANEOUS at 08:01

## 2024-01-17 RX ADMIN — ALBUTEROL SULFATE 2.5 MG: 2.5 SOLUTION RESPIRATORY (INHALATION) at 08:01

## 2024-01-17 RX ADMIN — LIDOCAINE HYDROCHLORIDE 20 MG: 20 INJECTION INTRAVENOUS at 02:01

## 2024-01-17 RX ADMIN — INSULIN ASPART 2 UNITS: 100 INJECTION, SOLUTION INTRAVENOUS; SUBCUTANEOUS at 12:01

## 2024-01-17 RX ADMIN — MEROPENEM 500 MG: 500 INJECTION INTRAVENOUS at 12:01

## 2024-01-17 RX ADMIN — MIDAZOLAM HYDROCHLORIDE 0.5 MG: 1 INJECTION INTRAMUSCULAR; INTRAVENOUS at 02:01

## 2024-01-17 RX ADMIN — PROPOFOL 100 MCG/KG/MIN: 10 INJECTION, EMULSION INTRAVENOUS at 02:01

## 2024-01-17 RX ADMIN — ERYTHROPOIETIN 4000 UNITS: 4000 INJECTION, SOLUTION INTRAVENOUS; SUBCUTANEOUS at 09:01

## 2024-01-17 RX ADMIN — SODIUM CHLORIDE: 9 INJECTION, SOLUTION INTRAVENOUS at 10:01

## 2024-01-17 RX ADMIN — ACETYLCYSTEINE 4 ML: 100 INHALANT RESPIRATORY (INHALATION) at 07:01

## 2024-01-17 RX ADMIN — DRONABINOL 5 MG: 2.5 CAPSULE ORAL at 10:01

## 2024-01-17 RX ADMIN — OXYBUTYNIN CHLORIDE 5 MG: 5 TABLET ORAL at 04:01

## 2024-01-17 RX ADMIN — SODIUM CHLORIDE, POTASSIUM CHLORIDE, SODIUM LACTATE AND CALCIUM CHLORIDE: 600; 310; 30; 20 INJECTION, SOLUTION INTRAVENOUS at 02:01

## 2024-01-17 NOTE — PROGRESS NOTES
[facility]  Nephrology  Progress Note    Patient Name: Lily Green  MRN: [id number]  Admission Date: 1/2/2024  Hospital Length of Stay: 15 days  Attending Provider: Tan Thomson MD   Primary Care Physician: Pavel Calixto MD  Principal Problem:Malignant carcinoid tumor of bronchus    Subjective:     Interval History: Micra placed yesterday 1/16. Continues with transient symptomatic bradycardic episodes. Plan for R chest tube placement today. On 2L NC.     Review of patient's allergies indicates:   Allergen Reactions    Crestor [rosuvastatin] Swelling    Gluten protein      Current Facility-Administered Medications   Medication Frequency    0.9%  NaCl infusion PRN    0.9%  NaCl infusion Continuous    acetylcysteine 100 mg/ml (10%) solution 4 mL TID WAKE    albuterol sulfate nebulizer solution 2.5 mg Q4H PRN    bisacodyL suppository 10 mg Daily PRN    dextrose 10% bolus 125 mL 125 mL PRN    dextrose 10% bolus 250 mL 250 mL PRN    droNABinol capsule 5 mg BID    epoetin noble injection 4,000 Units Every Mon, Wed, Fri    glucagon (human recombinant) injection 1 mg PRN    glucose chewable tablet 16 g PRN    glucose chewable tablet 24 g PRN    heparin (porcine) 2,000 Units in sodium chloride 0.9% 1,000 mL PRN    heparin (porcine) injection 1,000 Units PRN    heparin infusion 1,000 units/500 ml in 0.9% NaCl (on sterile field) PRN    hydrALAZINE injection 10 mg Q6H PRN    insulin aspart U-100 pen 0-10 Units Q4H PRN    [START ON 1/18/2024] insulin detemir U-100 (Levemir) pen 12 Units Daily    iodixanoL (VISIPAQUE 320) injection PRN    LIDOcaine 5 % patch 1 patch Q24H    LIDOcaine HCL 20 mg/ml (2%) injection PRN    meropenem (MERREM) 500 mg in sodium chloride 0.9 % 100 mL IVPB (MB+) Q12H    ondansetron injection 8 mg Q8H PRN    oxybutynin tablet 5 mg TID    pantoprazole EC tablet 40 mg Daily    polyethylene glycol packet 17 g Daily    senna-docusate 8.6-50 mg per tablet 1 tablet Daily     sodium chloride 0.9% flush 10 mL PRN       Objective:     Vital Signs (Most Recent):  Temp: 97.6 °F (36.4 °C) (01/17/24 1100)  Pulse: 74 (01/17/24 1300)  Resp: (!) 22 (01/17/24 1300)  BP: (!) 145/67 (01/17/24 1300)  SpO2: 100 % (01/17/24 1300) Vital Signs (24h Range):  Temp:  [97.4 °F (36.3 °C)-97.6 °F (36.4 °C)] 97.6 °F (36.4 °C)  Pulse:  [41-91] 74  Resp:  [0-49] 22  SpO2:  [67 %-100 %] 100 %  BP: ()/(50-79) 145/67     Weight: 77 kg (169 lb 12.1 oz) (01/17/24 0615)  Body mass index is 28.25 kg/m².  Body surface area is 1.88 meters squared.    I/O last 3 completed shifts:  In: 2985.2 [I.V.:2037.5; IV Piggyback:947.7]  Out: 2403 [Other:2403]     Physical Exam  Vitals and nursing note reviewed.   Constitutional:       Appearance: She is well-developed. She is ill-appearing.   HENT:      Head: Normocephalic and atraumatic.      Right Ear: External ear normal.      Left Ear: External ear normal.   Eyes:      General: No scleral icterus.        Right eye: No discharge.         Left eye: No discharge.      Conjunctiva/sclera: Conjunctivae normal.   Cardiovascular:      Rate and Rhythm: Normal rate and regular rhythm.      Heart sounds: Normal heart sounds. No murmur heard.     No friction rub. No gallop.   Pulmonary:      Effort: Pulmonary effort is normal. No respiratory distress.      Breath sounds: No stridor. Examination of the right-lower field reveals decreased breath sounds. Decreased breath sounds present. No wheezing or rales.   Abdominal:      General: Bowel sounds are normal.   Musculoskeletal:         General: No tenderness.      Right lower leg: Edema present.      Left lower leg: Edema present.   Skin:     General: Skin is warm and dry.   Neurological:      Mental Status: She is alert and oriented to person, place, and time.          Significant Labs:  CBC:   Recent Labs   Lab 01/17/24  0316   WBC 23.53*   RBC 2.63*   HGB 7.6*   HCT 23.2*      MCV 88   MCH 28.9   MCHC 32.8     CMP:   Recent  Labs   Lab 01/17/24  0316 01/17/24  1314   * 143*   CALCIUM 8.4* 8.7   ALBUMIN 2.4* 2.3*   PROT 5.8*  --    * 134*   K 4.8 4.5   CO2 22* 21*    104   BUN 25* 31*   CREATININE 1.9* 2.5*   ALKPHOS 158*  --    ALT 7*  --    AST 20  --    BILITOT 0.7  --      All labs within the past 24 hours have been reviewed.         Assessment/Plan:     Renal/  ESRD (end stage renal disease)  S/p R thoracotomy due to RML carcinoid tumor. On HD ~ 1 year. Last HD prior to presentation 1/1/24. Pt has NELY AVF placed on 10/23 that has not been cleared by vascular. Currently using RIJ TDC.     During her admission: stepped up to ICU for Afib with RVR. Was started on BB, amio and digoxin. However 1/12/24, had multiple episodes of eulogio with few asystole requiring chest compressions. Digoxin level elevated and dig-meaghan was given with resulting decrease in levels.     Nephrology History  iHD Schedule: TTS   Unit/MD: [facility]/ Dr. Vyas  Duration: 3.5 hours   EDW: 58 kg   Access: RIJ TDC  Residual Renal Function: minimal       Volume status: net positive this admission (records indicate 5 liters)    Assessment:     -Plan for 6 hour SLED, UF goal 0.5-1L  -Keep MAP > 65  -Keep hemoglobin > 7  -Strict ins and outs  -Avoid nephrotoxic agents if possible and renally dose medications  -Avoid drastic hemodynamic changes if possible      Oncology  * Malignant carcinoid tumor of bronchus  -management per primary         Thank you for your consult. I will follow-up with patient. Please contact us if you have any additional questions.    Christy Gaytan, BRITTANI  Nephrology  [facility]

## 2024-01-17 NOTE — NURSING
Alice Moe MD at bedside to removed L groin sutures. Gauze and transparent film placed. Verbal orders to keep pt flat for 4 hours.

## 2024-01-17 NOTE — NURSING
SICU PLAN OF CARE NOTE    Dx: Malignant carcinoid tumor of bronchus    Shift Events: PPM placed today, pt continuous to have symptomatic bradycardic episodes HR as low as 40. EP team and PPM rep aware, stated this is normal function of pacer and if patient were to sustain in the 40s for >1min notify EP fellow overnight. Plan for NPO at midnight for chest tube placement in IR tomorrow.     Gtts: none    Neuro: AAO x4, Follows Commands, and Moves All Extremities    Cardiac: NSR 60s-70s, bradycardia 40s    Respiratory: Nasal Cannula 4LPM    GI: Regular Diet    : Anuric 0 cc/shift     Labs/Accuchecks: daily labs, accuchecks Q4    Skin: No new skin breakdown noted during shift, pt turned and repositioned throughout shift. Bed plugged in,wheels locked,call light in reach. SCDS and foams in place. Pt updated on plan of care and verbalize understanding.

## 2024-01-17 NOTE — SUBJECTIVE & OBJECTIVE
Interval History: Micra placed yesterday 1/16. Continues with transient symptomatic bradycardic episodes. Plan for R chest tube placement today. On 2L NC.     Review of patient's allergies indicates:   Allergen Reactions    Crestor [rosuvastatin] Swelling    Gluten protein      Current Facility-Administered Medications   Medication Frequency    0.9%  NaCl infusion PRN    0.9%  NaCl infusion Continuous    acetylcysteine 100 mg/ml (10%) solution 4 mL TID WAKE    albuterol sulfate nebulizer solution 2.5 mg Q4H PRN    bisacodyL suppository 10 mg Daily PRN    dextrose 10% bolus 125 mL 125 mL PRN    dextrose 10% bolus 250 mL 250 mL PRN    droNABinol capsule 5 mg BID    epoetin noble injection 4,000 Units Every Mon, Wed, Fri    glucagon (human recombinant) injection 1 mg PRN    glucose chewable tablet 16 g PRN    glucose chewable tablet 24 g PRN    heparin (porcine) 2,000 Units in sodium chloride 0.9% 1,000 mL PRN    heparin (porcine) injection 1,000 Units PRN    heparin infusion 1,000 units/500 ml in 0.9% NaCl (on sterile field) PRN    hydrALAZINE injection 10 mg Q6H PRN    insulin aspart U-100 pen 0-10 Units Q4H PRN    [START ON 1/18/2024] insulin detemir U-100 (Levemir) pen 12 Units Daily    iodixanoL (VISIPAQUE 320) injection PRN    LIDOcaine 5 % patch 1 patch Q24H    LIDOcaine HCL 20 mg/ml (2%) injection PRN    meropenem (MERREM) 500 mg in sodium chloride 0.9 % 100 mL IVPB (MB+) Q12H    ondansetron injection 8 mg Q8H PRN    oxybutynin tablet 5 mg TID    pantoprazole EC tablet 40 mg Daily    polyethylene glycol packet 17 g Daily    senna-docusate 8.6-50 mg per tablet 1 tablet Daily    sodium chloride 0.9% flush 10 mL PRN       Objective:     Vital Signs (Most Recent):  Temp: 97.6 °F (36.4 °C) (01/17/24 1100)  Pulse: 74 (01/17/24 1300)  Resp: (!) 22 (01/17/24 1300)  BP: (!) 145/67 (01/17/24 1300)  SpO2: 100 % (01/17/24 1300) Vital Signs (24h Range):  Temp:  [97.4 °F (36.3 °C)-97.6 °F (36.4 °C)] 97.6 °F (36.4 °C)  Pulse:   [41-91] 74  Resp:  [0-49] 22  SpO2:  [67 %-100 %] 100 %  BP: ()/(50-79) 145/67     Weight: 77 kg (169 lb 12.1 oz) (01/17/24 0615)  Body mass index is 28.25 kg/m².  Body surface area is 1.88 meters squared.    I/O last 3 completed shifts:  In: 2985.2 [I.V.:2037.5; IV Piggyback:947.7]  Out: 2403 [Other:2403]     Physical Exam  Vitals and nursing note reviewed.   Constitutional:       Appearance: She is well-developed. She is ill-appearing.   HENT:      Head: Normocephalic and atraumatic.      Right Ear: External ear normal.      Left Ear: External ear normal.   Eyes:      General: No scleral icterus.        Right eye: No discharge.         Left eye: No discharge.      Conjunctiva/sclera: Conjunctivae normal.   Cardiovascular:      Rate and Rhythm: Normal rate and regular rhythm.      Heart sounds: Normal heart sounds. No murmur heard.     No friction rub. No gallop.   Pulmonary:      Effort: Pulmonary effort is normal. No respiratory distress.      Breath sounds: No stridor. Examination of the right-lower field reveals decreased breath sounds. Decreased breath sounds present. No wheezing or rales.   Abdominal:      General: Bowel sounds are normal.   Musculoskeletal:         General: No tenderness.      Right lower leg: Edema present.      Left lower leg: Edema present.   Skin:     General: Skin is warm and dry.   Neurological:      Mental Status: She is alert and oriented to person, place, and time.          Significant Labs:  CBC:   Recent Labs   Lab 01/17/24 0316   WBC 23.53*   RBC 2.63*   HGB 7.6*   HCT 23.2*      MCV 88   MCH 28.9   MCHC 32.8     CMP:   Recent Labs   Lab 01/17/24 0316 01/17/24  1314   * 143*   CALCIUM 8.4* 8.7   ALBUMIN 2.4* 2.3*   PROT 5.8*  --    * 134*   K 4.8 4.5   CO2 22* 21*    104   BUN 25* 31*   CREATININE 1.9* 2.5*   ALKPHOS 158*  --    ALT 7*  --    AST 20  --    BILITOT 0.7  --      All labs within the past 24 hours have been reviewed.

## 2024-01-17 NOTE — SUBJECTIVE & OBJECTIVE
Interval History/Significant Events:   Patient continues to to have bradycardic episodes, where she drops into the 40s and is symptomatic.  Shortly thereafter the pacemaker cases her.  We have been assured by the EP team and the device representative this is normal, we will reach out today to see if we should increase the backup pacing rate.  IR procedure may be pushed to the right based on this intermittent bradycardia and the teams hesitancy to perform the procedure without anesthesia.  White count has decreased this morning, patient remains afebrile and hemodynamically stable.  She does seem a little down emotionally, sadly.    POD 1 from micra placement  POD 15 from thoracotomy      Objective:     Vital Signs (Most Recent):  Temp: 97.6 °F (36.4 °C) (01/17/24 1100)  Pulse: 75 (01/17/24 1100)  Resp: (!) 34 (01/17/24 1100)  BP: 133/72 (01/17/24 1100)  SpO2: 98 % (01/17/24 1100) Vital Signs (24h Range):  Temp:  [97.4 °F (36.3 °C)-97.6 °F (36.4 °C)] 97.6 °F (36.4 °C)  Pulse:  [41-91] 75  Resp:  [0-49] 34  SpO2:  [67 %-100 %] 98 %  BP: ()/(50-79) 133/72     Weight: 77 kg (169 lb 12.1 oz)  Body mass index is 28.25 kg/m².      Intake/Output Summary (Last 24 hours) at 1/17/2024 1108  Last data filed at 1/17/2024 0000  Gross per 24 hour   Intake 367.87 ml   Output --   Net 367.87 ml            Physical Exam  Vitals and nursing note reviewed.   Constitutional:       General: She is not in acute distress.     Appearance: Normal appearance. She is normal weight. She is ill-appearing. She is not toxic-appearing.   HENT:      Head: Normocephalic and atraumatic.      Right Ear: External ear normal.      Left Ear: External ear normal.      Nose: Nose normal.      Comments: Nasal cannula in place  Eyes:      General: No scleral icterus.        Right eye: No discharge.         Left eye: No discharge.      Extraocular Movements: Extraocular movements intact.      Conjunctiva/sclera: Conjunctivae normal.   Cardiovascular:       Rate and Rhythm: Normal rate and regular rhythm.   Pulmonary:      Effort: Pulmonary effort is normal. No respiratory distress.   Abdominal:      General: Abdomen is flat. There is no distension.      Palpations: Abdomen is soft. There is no mass.      Tenderness: There is no abdominal tenderness. There is no guarding.      Hernia: No hernia is present.   Genitourinary:     Comments: Groin incisions covered with gauze and Tegaderm bilaterally, no breakthrough bleeding or hematomas noted  Musculoskeletal:         General: Normal range of motion.      Cervical back: Normal range of motion and neck supple.   Skin:     General: Skin is warm and dry.   Neurological:      General: No focal deficit present.      Mental Status: She is alert and oriented to person, place, and time. Mental status is at baseline.   Psychiatric:         Mood and Affect: Mood normal.         Behavior: Behavior normal.         Thought Content: Thought content normal.            Vents:  Oxygen Concentration (%): 24 (01/15/24 0200)    Lines/Drains/Airways       Central Venous Catheter Line  Duration                  Hemodialysis Catheter 01/25/23 1501 right internal jugular 356 days              Peripheral Intravenous Line  Duration                  Hemodialysis AV Fistula 09/01/23 0800 Left forearm 138 days         Peripheral IV - Single Lumen 01/15/24 1230 20 G;1 3/4 in Anterior;Proximal;Right Forearm 1 day         Peripheral IV - Single Lumen 01/16/24 1300 20 G Anterior;Proximal;Right Forearm <1 day                    Significant Labs:    CBC/Anemia Profile:  Recent Labs   Lab 01/16/24  0255 01/17/24  0316   WBC 30.10* 23.53*   HGB 7.7* 7.6*   HCT 23.2* 23.2*    256   MCV 86 88   RDW 19.3* 19.9*          Chemistries:  Recent Labs   Lab 01/16/24  0255 01/16/24  1532 01/16/24  2141 01/17/24  0316    137 136 135*   K 3.8 4.3 4.5 4.8    107 105 104   CO2 22* 20* 22* 22*   BUN 8 17 21 25*   CREATININE 0.7 1.5* 1.7* 1.9*   CALCIUM  8.4* 8.3* 8.4* 8.4*   ALBUMIN 2.3* 2.3* 2.3* 2.4*   PROT 6.0  --   --  5.8*   BILITOT 0.8  --   --  0.7   ALKPHOS 158*  --   --  158*   ALT <5*  --   --  7*   AST 21  --   --  20   MG 1.8 2.5 2.5 2.4   PHOS 2.4* 5.4* 5.6* 5.6*         All pertinent labs within the past 24 hours have been reviewed.    Significant Imaging:  I have reviewed all pertinent imaging results/findings within the past 24 hours.

## 2024-01-17 NOTE — PLAN OF CARE
"SICU Plan of Care:     Called to bedside to evaluate Ms. Green after her pacemaker placement and TVP removal. Sign-out given included the patient needed to stay flat for a number of hours, and could be transitioned to a 30° seated position 6:00 p.m. Bed rest is completed at 8 p.m.      Not soon after arrival back to the unit, her heart rate dropped down to the 40s with the patient experiencing anxiety simultaneously, stating that she "felt it happening again".  I called the electrophysiology team, and they stated that this was normal functioning of the new pacemaker, and to contact the team overnight should the bradycardia last longer than a minute.  I also called the [employer] representative, who also reassured me that this was normal function of the new pacemaker, and the bradycardia would continue to happen but should only be fleeting in nature and allows the pacemaker to be more synchronous with the patient's native rhythm. I, in turn, reassured the patient that the symptoms she was experiencing were expected by the cardiology team.       Plan:  -We will continue to monitor her heart rate and symptoms overnight and will have atropine at bedside in the event that her pacemaker malfunctions. We will discuss a contingency plan with the electrophysiology team should her symptoms continue to be problematic.  Should she become unstable overnight, EP should be notified immediately[place] [id number].  -Continue to hold heparin for 5 days after pacemaker placement, per cardiology team  - Okay to eat once head of bed can be 30° at 6:00 p.m.; patient is NPO at midnight for IR procedure in the morning      CORNEL Sapp MD  General Surgery, PGY-2  [phone]    "

## 2024-01-17 NOTE — PLAN OF CARE
Problem: Physical Therapy  Goal: Physical Therapy Goal  Description: PT goals until 2/9/24    1. Pt supine to sit with supervision-not met  2. Pt sit to supine with supervision-not met  3. Pt sit to stand with AD if needed with supervision-not met  4. Pt to perform gait 150 ft with AD if needed with supervision.-not met  5. Pt to go up/down curb step with AD if needed with CGA.-not met    Outcome: Ongoing, Progressing   Goals remain appropriate. 1/17/2024

## 2024-01-17 NOTE — PROGRESS NOTES
[facility]  Cardiac Electrophysiology  Progress Note    Admission Date: 1/2/2024  Code Status: Full Code   Attending Physician: Tan Thomson MD   Expected Discharge Date: 1/19/2024  Principal Problem:Malignant carcinoid tumor of bronchus    Subjective:     Interval History:  Tele with sinus rhythm, bradycardic episodes to backup pacing rate. Pacing at 40bpm when needed transiently and back to her native rate. Hemodynamically stable during these episodes but significantly symptomatic.    Review of Systems   Constitutional: Negative for diaphoresis and fever.   Cardiovascular:  Negative for chest pain, dyspnea on exertion, leg swelling, near-syncope, orthopnea, palpitations, paroxysmal nocturnal dyspnea and syncope.   Respiratory:  Negative for cough and shortness of breath.    Gastrointestinal:  Negative for abdominal pain, diarrhea, nausea and vomiting.   Neurological:  Negative for light-headedness.   Psychiatric/Behavioral:  Negative for altered mental status and substance abuse.      Objective:     Vital Signs (Most Recent):  Temp: 97.5 °F (36.4 °C) (01/17/24 0305)  Pulse: 73 (01/17/24 1015)  Resp: (!) 27 (01/17/24 1015)  BP: 139/64 (01/17/24 1000)  SpO2: 99 % (01/17/24 1015) Vital Signs (24h Range):  Temp:  [97.4 °F (36.3 °C)-97.6 °F (36.4 °C)] 97.5 °F (36.4 °C)  Pulse:  [41-90] 73  Resp:  [0-39] 27  SpO2:  [67 %-100 %] 99 %  BP: ()/(50-79) 139/64     Weight: 77 kg (169 lb 12.1 oz)  Body mass index is 28.25 kg/m².     SpO2: 99 %        Physical Exam  Constitutional:       Appearance: Normal appearance.   Cardiovascular:      Rate and Rhythm: Normal rate and regular rhythm.      Pulses: Normal pulses.      Heart sounds: Normal heart sounds.      Comments: TVP in place  Pulmonary:      Effort: Pulmonary effort is normal.      Breath sounds: Normal breath sounds. No wheezing or rales.   Abdominal:      General: Abdomen is flat. There is no distension.      Palpations: Abdomen is  soft.      Tenderness: There is no abdominal tenderness.   Musculoskeletal:      Right lower leg: No edema.      Left lower leg: No edema.   Skin:     General: Skin is warm and dry.   Neurological:      Mental Status: She is alert and oriented to person, place, and time. Mental status is at baseline.   Psychiatric:         Mood and Affect: Mood normal.         Behavior: Behavior normal.            Significant Labs: All pertinent lab results from the last 24 hours have been reviewed.    Significant Imaging:  Reviewed  Assessment and Plan:     Atrial fibrillation  #Bradycardia    73yoF with ESRD admitted for bronchial tumor removal with post op course complicated by atrial fibrillation. She had difficult to control rates but further post-operatively, her rates improved so was weaned off rate controlling agents. Overnight 1/11-1/12 and morning of 1/12 she had several bradycardic episodes in the setting of atrial fibrillation, recent digoxin use (reportedly renally dosed, but some high levels with ESRD).    In the setting of asystolic events with several short rounds of compressions (no more than 1 round at a time), TVP was attempted. 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 She is post PTA and stenting of the left innominate vein. This was done at an outside hospital. Micra implantation was decided on due to infection risk and difficult venous anatomy.    EF 60-65% during this admission  Jan 13, 2024 EKG: Sinus rhythm, QRS 76    Recommendations:  - Micra in placed 1/16  - Primary planning for thoracentesis soon with IR Connor M Gillies, MD  Cardiac Electrophysiology  [facility] - Surgical Intensive Care

## 2024-01-17 NOTE — ASSESSMENT & PLAN NOTE
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 Stepped up to the unit for persistent afib rvr. Had SBO (now resolved). 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  Pacemaker placed on 01/16.       Neuro/Psych:   -- Sedation: none  -- Pain: Lidocaine patch   -- has some baseline anxiety, no longer on meds     Cards:   -- BP goals SBP >90, MAPs >55  -- Afib RVR   - Amio and BB held post cardiac arrest   - prn iv metoprolol for sustained RVR > 130's   - heparin gtt for anticoagulation    - transition to oral regiment once procedures complete  -- Asystolic arrest   - 1/12/24 patient went asystole. Had multiple bradycardic events prior to which had self resolved. At time of asystole, nurse performed 1 min of chest compressions and achieved ROSC. STAT TVP placement unable to be obtained due to central venous stenosis. 2nd episode of asystole resolved after minimal compressions. 3rd episode of asystole resolved with singular compression. S/p TVP via groin in Cath lab 1/12.   - TVP removed 1/16   - PPM 1/16, heparin gtt off and will continue until 1/21    Continues to have bradycardic events where she had a Eulogio down into the 40s, and then is paced at a backup rate of 50.  She is symptomatic during these events , but is hemodynamically stable     - digoxin level high --> administered digibind 1/12, transitioned to normal sinus after administration. Dig level now within normal limits x 2     Pulm:   -- Goal O2 sat > 90%  -- on 1L satting upper 90's.  -- Chest tube d/c'd 1/8/24  -- CXR with pleural effusions and RLL opacification  -- CT chest (1/13): R worse than L pleural effusion, pericardial effusion   -- IR consulted for pleural effusions -- requesting R side be addressed first (IR likely to drain tomorrow, 1/18)   -- anesthesia we will likely need to be included in this procedure due  to the patient's bradycardic episodes.  This may push the procedure to tomorrow or the next day.  We will continue to reach out and communicate to the EP team and the IR team to see what we can streamline, and how we can get this patient the procedure she needs in the quickest fashion possible.  -- continue pulmonary toilet with acetylcysteine and prn albuterol nebulizer        Renal:  -- history of ESRD, dialysis dependent   - CRRT per nephrology  -- trend BUN/Cr  -- continue sevelamer TIDWM  -- patient is oliguric  -- permacath in place last exchanged 10/2023  -- fistula unable to be used  -- oxybutynin in effort to reduce possible bladder spasm triggering eulogio episodes    Recent Labs   Lab 01/16/24  1532 01/16/24  2141 01/17/24  0316   BUN 17 21 25*   CREATININE 1.5* 1.7* 1.9*           FEN / GI:   -- CT on 1/8/24 demonstrating SBO, now resolved  -- US on 1/8/24 showing distended gallbladder without signs of acute cholecystitis   - NGT removed.  - Equivocal GGC 1/10  - DC Reglan 1/17  -- PPI  -- Replace lytes as needed  -- Nutrition: diabetic/renal/gluten free soft and bite sized diet with novasource daily  - Can restart diet if patient is not going to get her IR procedure today. Aspiration precautions are still of utmost importance. Appreciate nursing assistance with pt eating. Dietician consulted for complex dietary restriction  - home appetite stimulant dronabinol 5 mg BID      ID:   -- Tm: afebrile; WBC decreased to 23.5 from 30  -- C/f HAP with RLL consolidation  -- MRSA neg, dc vanc  -- Cont zosyn for 7 day course  -- blood cultures with 1 bottle growing staph epi, likely a contaminant.  -- possible IR procedure on pleural effusions later this week, if so would extend abx coverage 4 days after source control achieved and f/u cultures from procedure    Recent Labs   Lab 01/15/24  0201 01/16/24  0255 01/17/24  0316   WBC 26.79* 30.10* 23.53*           Heme/Onc:  -- Daily CBC  -- on heparin gtt for A-fib,  hold hep 5 days after PPM 1/16 (restart 1/21)  Recent Labs   Lab 01/11/24  0358 01/12/24  0422 01/12/24  0631 01/12/24  1316 01/15/24  0201 01/15/24  1117 01/15/24  2312 01/16/24  0255 01/17/24  0316   HGB 9.0* 8.9*  --    < > 8.4*  --   --  7.7* 7.6*    312  --    < > 329  --   --  288 256   APTT 39.0* 33.9* 62.5*   < > 49.8*   < > 56.1* 22.8 23.9   INR 1.2 1.2 1.2  --   --   --   --   --   --     < > = values in this interval not displayed.           Endo:   -- history of diabetes  -- Gluc goal 140-180  -- SSI      PPx:   Feeding: diabetic/renal/gluten diet and novasource (restart after procedure)  Analgesia/Sedation:  Pain is controlled, no sedation  Thromboembolic prevention: heparin gtt held for PPM  HOB >30: yes  Stress Ulcer ppx: pantoprazole  Glucose control: Critical care goal 140-180 g/dl, SSI  Bowel reg:  having BM  Invasive Lines/Drains/Airway: Permacath, PIV x2, TVP groin line  Deescalation: none        Dispo/Code Status/Palliative:   -- SICU/ Full Code

## 2024-01-17 NOTE — CARE UPDATE
-Glucose Goal 140-180    -A1C:   Hemoglobin A1C   Date Value Ref Range Status   11/15/2023 4.9 4.0 - 5.6 % Final     Comment:     ADA Screening Guidelines:  5.7-6.4%  Consistent with prediabetes  >or=6.5%  Consistent with diabetes    High levels of fetal hemoglobin interfere with the HbA1C  assay. Heterozygous hemoglobin variants (HbS, HgC, etc)do  not significantly interfere with this assay.   However, presence of multiple variants may affect accuracy.     10/23/2019 6.8 % Final         -HOME REGIMEN: LDC SSI   Blood sugar 150 to 200, + 1 unit  Blood sugar 201 to 250, + 2 units  Blood sugar 251 to 300, + 3 units  Blood sugar 301 to 350, + 4 units   Blood sugar greater than 350, + 5 units      -GLUCOSE TREND FOR THE PAST 24HRS:   Recent Labs   Lab 01/17/24  0318 01/17/24  0320 01/17/24  0439 01/17/24  0842 01/17/24  1235 01/17/24  1546   POCTGLUCOSE 280* 288* 255* 212* 172* 111*         -NO HYPOGYCEMIAS NOTED     - Diet  Diet diabetic Soft & Bite Sized (IDDSI Level 6), Gluten Free, Renal; 2000 Calorie; Standard Tray  Diet NPO Except for: Sips with Medication    -NPO? After midnight tonight     -Steroids - none     -Tube Feeds - none         Plan:   BG above goal this AM however now trending below goal. NPO today for procedure (diet since advanced) however may become NPO aftermidnight again tonight. Will continue to monitor on current insulin regimen and increase levemir dose if fasting BG remains elevated.   - Levemir 10 units daily (WBD 0.3 units/kg/day)  - Novolog Mod dose correction with ISF 25 starting at 150 prn q 4 hr while NPO   - POCT Glucose every 4 hours while NPO   - Hypoglycemia protocol in place      ** Please notify Endocrine for any change and/or advance in diet**  ** Please call Endocrine for any BG related issues **     Discharge Planning:   TBD. Please notify endocrinology prior to discharge.

## 2024-01-17 NOTE — PROGRESS NOTES
"[facility] - [facility]  Infectious Disease  Progress Note    Patient Name: Lily Green  MRN: [id number]  Admission Date: 1/2/2024  Length of Stay: 15 days  Attending Physician: Tan Thomson MD  Primary Care Provider: Pavel Calixto MD    Isolation Status: No active isolations  Assessment/Plan:      Pulmonary  Parapneumonic effusion  I have reviewed hospital notes from  SIC service and other specialty providers. I have also reviewed CBC, CMP/BMP,  cultures and imaging with my interpretation as documented.     Admitted for carcinoid tumor lymphadenectomy,  robotic-assisted surgery converted to right thoracotomy with therapeutic wedge resection and open mediastinal lymph node dissection on 1/2. Hospital course complicated by Afib with RVR, ileus managed with NGT which has since been removed, and tachy-eulogio syndrome requiring temporary pacemaker. Now with multi loculated pleural effusion presumptively parapneumonic in etiology. However, diagnostic work up to assess whether effusion is infectious or non infectious not yet performed.     Re-consulted on 1/16 for "OK" to proceed with pacemaker placement. S/P PPM on 1/16. Afebrile and with stable to slightly down trending leukocytosis.   Can continue meropenem for now.  Please send pleural effusion aspirate for cell count, gram stain, culture, AFB stain and culture, as well as pathology.  Discussed with patient and family at bedside.   Discussed management plan with the staff and/or members from SICU service.        Anticipated Disposition: per primary    Thank you for your consult. I will follow-up with patient. Please contact us if you have any additional questions.    Rosio Rivera MD  Infectious Disease  [facility]      50 minutes of total time spent on the encounter, which includes face to face time and non-face to face time preparing to see the patient (eg, review of tests), obtaining and/or reviewing " "separately obtained history, documenting clinical information in the electronic or other health record, independently interpreting results (not separately reported) and communicating results to the patient/family/caregiver, or care coordination (not separately reported).     Subjective:     Principal Problem:Malignant carcinoid tumor of bronchus    HPI: 73 y.o. female never smoker with DDD, benign essential tremor, meniere's disease, HTN, HLD, DM2, GERD, Celiac's, IBS who is found to have RML Carcinoid tumor presented on 1/2 for lymphadenectomy,  robotic-assisted surgery converted to right thoracotomy with therapeutic wedge resection and open mediastinal lymph node dissection. Stepped up to the unit after converting into afib RVR after dialysis 1/5. She was given 3 doses of metoprolol 5, without any response and her heart rate or rhythm.  The oxygen requirements increased to 5 L from room air.      CT chest on 1/13 revealed mediastinal LAD, Moderate-sized bilateral pleural effusions, increased from 01/08/2024; likely multiply-loculated on the right. Underlying pleural infection or pleural neoplasm not excluded.     ID consulted for: "needs meropenem for right sided loculated pleural effusion, possible IR drainage later this week."    Patient seen and evaluated by Infectious Diseases with recommendations for meropenem while awaiting further work up of the right pleural effusion.    Infectious Diseases re-consulted on 1/16 for "EP needs ok for pacemaker previous to pleural effusion drainage"      Interval History: "Not great". Right sided pleural effusion of unknown etiology. On broad spectrum antibiotics. Underwent PPM o 1/16. Bradycardia episodes overnight. Pending thoracentesis today.    Review of Systems   Constitutional:  Positive for fatigue. Negative for chills, fever and unexpected weight change.   HENT:  Negative for ear pain, facial swelling, hearing loss, mouth sores, nosebleeds, rhinorrhea, sinus pressure, " sore throat, tinnitus, trouble swallowing and voice change.    Eyes:  Negative for photophobia, pain, redness and visual disturbance.   Respiratory:  Positive for cough and shortness of breath. Negative for chest tightness and wheezing.    Cardiovascular:  Negative for chest pain, palpitations and leg swelling.   Gastrointestinal:  Negative for abdominal pain, blood in stool, constipation, diarrhea, nausea and vomiting.   Endocrine: Negative for cold intolerance, heat intolerance, polydipsia, polyphagia and polyuria.   Genitourinary:  Negative for decreased urine volume, dysuria, flank pain, frequency, hematuria, menstrual problem, urgency, vaginal bleeding, vaginal discharge and vaginal pain.   Musculoskeletal:  Negative for arthralgias, back pain, joint swelling, myalgias and neck pain.   Skin:  Negative for rash.   Allergic/Immunologic: Negative for environmental allergies, food allergies and immunocompromised state.   Neurological:  Negative for dizziness, seizures, syncope, weakness, light-headedness, numbness and headaches.   Hematological:  Negative for adenopathy. Does not bruise/bleed easily.   Psychiatric/Behavioral:  Negative for confusion, hallucinations, self-injury, sleep disturbance and suicidal ideas. The patient is not nervous/anxious.      Objective:     Vital Signs (Most Recent):  Temp: 97.6 °F (36.4 °C) (01/17/24 1100)  Pulse:  (Anesthesia bedside; refer to anesthesia documentation) (01/17/24 1440)  Resp:  (Anesthesia bedside; refer to anesthesia documentation) (01/17/24 1440)  BP:  (Anesthesia bedside; refer to anesthesia documentation) (01/17/24 1440)  SpO2:  (Anesthesia bedside; refer to anesthesia documentation) (01/17/24 1440) Vital Signs (24h Range):  Temp:  [97.5 °F (36.4 °C)-97.6 °F (36.4 °C)] 97.6 °F (36.4 °C)  Pulse:  [56-91] 74  Resp:  [0-49] 29  SpO2:  [67 %-100 %] 99 %  BP: ()/(50-79) 156/74     Weight: 77 kg (169 lb 12.1 oz)  Body mass index is 28.25 kg/m².    Estimated  Creatinine Clearance: 20.6 mL/min (A) (based on SCr of 2.5 mg/dL (H)).     Physical Exam  Vitals and nursing note reviewed.   Constitutional:       Appearance: She is well-developed. She is ill-appearing.   HENT:      Head: Normocephalic and atraumatic.      Right Ear: External ear normal.      Left Ear: External ear normal.   Eyes:      General: No scleral icterus.        Right eye: No discharge.         Left eye: No discharge.      Conjunctiva/sclera: Conjunctivae normal.   Cardiovascular:      Rate and Rhythm: Normal rate and regular rhythm.      Heart sounds: Normal heart sounds. No murmur heard.     No friction rub. No gallop.   Pulmonary:      Effort: Pulmonary effort is normal. No respiratory distress.      Breath sounds: No stridor. Examination of the right-lower field reveals decreased breath sounds. Decreased breath sounds present. No wheezing or rales.   Abdominal:      General: Bowel sounds are normal.   Musculoskeletal:         General: No tenderness.      Right lower leg: Edema present.      Left lower leg: Edema present.   Skin:     General: Skin is warm and dry.   Neurological:      Mental Status: She is alert and oriented to person, place, and time.          Significant Labs: BMP:   Recent Labs   Lab 01/17/24  0316 01/17/24  1314   * 143*   * 134*   K 4.8 4.5    104   CO2 22* 21*   BUN 25* 31*   CREATININE 1.9* 2.5*   CALCIUM 8.4* 8.7   MG 2.4  --      CBC:   Recent Labs   Lab 01/16/24  0255 01/17/24  0316   WBC 30.10* 23.53*   HGB 7.7* 7.6*   HCT 23.2* 23.2*    256     Microbiology Results (last 7 days)       Procedure Component Value Units Date/Time    Blood culture [[id number]] Collected: 01/12/24 0411    Order Status: Completed Specimen: Blood from Peripheral, Foot, Left Updated: 01/17/24 0812     Blood Culture, Routine No growth after 5 days.    Culture, Respiratory with Gram Stain [[id number]]  (Abnormal)  (Susceptibility) Collected: 01/11/24 2030    Order Status:  Completed Specimen: Sputum, Induced Updated: 01/15/24 0925     Respiratory Culture No S aureus isolated.      KLEBSIELLA PNEUMONIAE  Many        ESCHERICHIA COLI  Many  Normal respiratory ramu also present       Gram Stain (Respiratory) <10 epithelial cells per low power field.     Gram Stain (Respiratory) Moderate WBC's     Gram Stain (Respiratory) Many Gram negative rods     Gram Stain (Respiratory) Rare Gram positive cocci    Blood culture [[id number]]  (Abnormal) Collected: 01/11/24 1601    Order Status: Completed Specimen: Blood from Peripheral, Antecubital, Right Updated: 01/14/24 0812     Blood Culture, Routine Gram stain shahriar bottle: Gram positive cocci in clusters resembling Staph      Results called to and read back by: Gena UNGER RN 01/12/2024  18:43      COAGULASE-NEGATIVE STAPHYLOCOCCUS SPECIES  Organism is a probable contaminant      Culture, MRSA [[id number]] Collected: 01/11/24 1836    Order Status: Completed Specimen: MRSA source from Nares, Right Updated: 01/13/24 0844     MRSA Surveillance Screen No MRSA isolated    Rapid Organism ID by PCR (from Blood culture) [[id number]]  (Abnormal) Collected: 01/11/24 1601    Order Status: Completed Updated: 01/12/24 1956     Enterococcus faecalis Not Detected     Enterococcus faecium Not Detected     Listeria monocytogenes Not Detected     Staphylococcus spp. See species for ID     Staphylococcus aureus Not Detected     Staphylococcus epidermidis Detected     Staphylococcus lugdunensis Not Detected     Streptococcus species Not Detected     Streptococcus agalactiae Not Detected     Streptococcus pneumoniae Not Detected     Streptococcus pyogenes Not Detected     Acinetobacter calcoaceticus/baumannii complex Not Detected     Bacteroides fragilis Not Detected     Enterobacterales Not Detected     Enterobacter cloacae complex Not Detected     Escherichia coli Not Detected     Klebsiella aerogenes Not Detected     Klebsiella oxytoca Not Detected     Klebsiella  pneumoniae group Not Detected     Proteus Not Detected     Salmonella sp Not Detected     Serratia marcescens Not Detected     Haemophilus influenzae Not Detected     Neisseria meningtidis Not Detected     Pseudomonas aeruginosa Not Detected     Stenotrophomonas maltophilia Not Detected     Candida albicans Not Detected     Candida auris Not Detected     Candida glabrata Not Detected     Candida krusei Not Detected     Candida parapsilosis Not Detected     Candida tropicalis Not Detected     Cryptococcus neoformans/gattii Not Detected     CTX-M (ESBL ) Test Not Applicable     IMP (Carbapenem resistant) Test Not Applicable     KPC resistance gene (Carbapenem resistant) Test Not Applicable     mcr-1  Test Not Applicable     mec A/C  Not Detected     mec A/C and MREJ (MRSA) gene Test Not Applicable     NDM (Carbapenem resistant) Test Not Applicable     OXA-48-like (Carbapenem resistant) Test Not Applicable     van A/B (VRE gene) Test Not Applicable     VIM (Carbapenem resistant) Test Not Applicable            Significant Imaging: I have reviewed all pertinent imaging results/findings within the past 24 hours.

## 2024-01-17 NOTE — PLAN OF CARE
Pt arrived to   for right pleural drain. Pt oriented to unit and staff. Plan of care reviewed with patient, patient verbalizes understanding. Comfort measures utilized. Pt safely transferred from stretcher to procedural table. Fall risk reviewed with patient, fall risk interventions maintained. Positioner pillows utilized to minimize pressure points. Blankets applied. Pt prepped and draped utilizing standard sterile technique. Timeouts completed utilizing standard universal time-out, per department and facility policy.  Anesthesia at bedside; Refer to anesthesia record regarding sedation and vital signs.

## 2024-01-17 NOTE — PLAN OF CARE
Right chest tube placement complete. Pt tolerated well. VSS. No signs or symptoms of distress noted.  Site CDI.  Pt will be transferred [id number]. Report mika Jerry RN.

## 2024-01-17 NOTE — SUBJECTIVE & OBJECTIVE
"Interval History: "Not great". Right sided pleural effusion of unknown etiology. On broad spectrum antibiotics. Underwent PPM o 1/16. Bradycardia episodes overnight. Pending thoracentesis today.    Review of Systems   Constitutional:  Positive for fatigue. Negative for chills, fever and unexpected weight change.   HENT:  Negative for ear pain, facial swelling, hearing loss, mouth sores, nosebleeds, rhinorrhea, sinus pressure, sore throat, tinnitus, trouble swallowing and voice change.    Eyes:  Negative for photophobia, pain, redness and visual disturbance.   Respiratory:  Positive for cough and shortness of breath. Negative for chest tightness and wheezing.    Cardiovascular:  Negative for chest pain, palpitations and leg swelling.   Gastrointestinal:  Negative for abdominal pain, blood in stool, constipation, diarrhea, nausea and vomiting.   Endocrine: Negative for cold intolerance, heat intolerance, polydipsia, polyphagia and polyuria.   Genitourinary:  Negative for decreased urine volume, dysuria, flank pain, frequency, hematuria, menstrual problem, urgency, vaginal bleeding, vaginal discharge and vaginal pain.   Musculoskeletal:  Negative for arthralgias, back pain, joint swelling, myalgias and neck pain.   Skin:  Negative for rash.   Allergic/Immunologic: Negative for environmental allergies, food allergies and immunocompromised state.   Neurological:  Negative for dizziness, seizures, syncope, weakness, light-headedness, numbness and headaches.   Hematological:  Negative for adenopathy. Does not bruise/bleed easily.   Psychiatric/Behavioral:  Negative for confusion, hallucinations, self-injury, sleep disturbance and suicidal ideas. The patient is not nervous/anxious.      Objective:     Vital Signs (Most Recent):  Temp: 97.6 °F (36.4 °C) (01/17/24 1100)  Pulse:  (Anesthesia bedside; refer to anesthesia documentation) (01/17/24 1440)  Resp:  (Anesthesia bedside; refer to anesthesia documentation) (01/17/24 " 1440)  BP:  (Anesthesia bedside; refer to anesthesia documentation) (01/17/24 1440)  SpO2:  (Anesthesia bedside; refer to anesthesia documentation) (01/17/24 1440) Vital Signs (24h Range):  Temp:  [97.5 °F (36.4 °C)-97.6 °F (36.4 °C)] 97.6 °F (36.4 °C)  Pulse:  [56-91] 74  Resp:  [0-49] 29  SpO2:  [67 %-100 %] 99 %  BP: ()/(50-79) 156/74     Weight: 77 kg (169 lb 12.1 oz)  Body mass index is 28.25 kg/m².    Estimated Creatinine Clearance: 20.6 mL/min (A) (based on SCr of 2.5 mg/dL (H)).     Physical Exam  Vitals and nursing note reviewed.   Constitutional:       Appearance: She is well-developed. She is ill-appearing.   HENT:      Head: Normocephalic and atraumatic.      Right Ear: External ear normal.      Left Ear: External ear normal.   Eyes:      General: No scleral icterus.        Right eye: No discharge.         Left eye: No discharge.      Conjunctiva/sclera: Conjunctivae normal.   Cardiovascular:      Rate and Rhythm: Normal rate and regular rhythm.      Heart sounds: Normal heart sounds. No murmur heard.     No friction rub. No gallop.   Pulmonary:      Effort: Pulmonary effort is normal. No respiratory distress.      Breath sounds: No stridor. Examination of the right-lower field reveals decreased breath sounds. Decreased breath sounds present. No wheezing or rales.   Abdominal:      General: Bowel sounds are normal.   Musculoskeletal:         General: No tenderness.      Right lower leg: Edema present.      Left lower leg: Edema present.   Skin:     General: Skin is warm and dry.   Neurological:      Mental Status: She is alert and oriented to person, place, and time.          Significant Labs: BMP:   Recent Labs   Lab 01/17/24  0316 01/17/24  1314   * 143*   * 134*   K 4.8 4.5    104   CO2 22* 21*   BUN 25* 31*   CREATININE 1.9* 2.5*   CALCIUM 8.4* 8.7   MG 2.4  --      CBC:   Recent Labs   Lab 01/16/24  0255 01/17/24  0316   WBC 30.10* 23.53*   HGB 7.7* 7.6*   HCT 23.2* 23.2*     256     Microbiology Results (last 7 days)       Procedure Component Value Units Date/Time    Blood culture [[id number]] Collected: 01/12/24 0411    Order Status: Completed Specimen: Blood from Peripheral, Foot, Left Updated: 01/17/24 0812     Blood Culture, Routine No growth after 5 days.    Culture, Respiratory with Gram Stain [[id number]]  (Abnormal)  (Susceptibility) Collected: 01/11/24 2030    Order Status: Completed Specimen: Sputum, Induced Updated: 01/15/24 0925     Respiratory Culture No S aureus isolated.      KLEBSIELLA PNEUMONIAE  Many        ESCHERICHIA COLI  Many  Normal respiratory ramu also present       Gram Stain (Respiratory) <10 epithelial cells per low power field.     Gram Stain (Respiratory) Moderate WBC's     Gram Stain (Respiratory) Many Gram negative rods     Gram Stain (Respiratory) Rare Gram positive cocci    Blood culture [[id number]]  (Abnormal) Collected: 01/11/24 1601    Order Status: Completed Specimen: Blood from Peripheral, Antecubital, Right Updated: 01/14/24 0812     Blood Culture, Routine Gram stain shahriar bottle: Gram positive cocci in clusters resembling Staph      Results called to and read back by: Gena UNGER RN 01/12/2024  18:43      COAGULASE-NEGATIVE STAPHYLOCOCCUS SPECIES  Organism is a probable contaminant      Culture, MRSA [[id number]] Collected: 01/11/24 1836    Order Status: Completed Specimen: MRSA source from Nares, Right Updated: 01/13/24 0844     MRSA Surveillance Screen No MRSA isolated    Rapid Organism ID by PCR (from Blood culture) [[id number]]  (Abnormal) Collected: 01/11/24 1601    Order Status: Completed Updated: 01/12/24 1956     Enterococcus faecalis Not Detected     Enterococcus faecium Not Detected     Listeria monocytogenes Not Detected     Staphylococcus spp. See species for ID     Staphylococcus aureus Not Detected     Staphylococcus epidermidis Detected     Staphylococcus lugdunensis Not Detected     Streptococcus species Not Detected      Streptococcus agalactiae Not Detected     Streptococcus pneumoniae Not Detected     Streptococcus pyogenes Not Detected     Acinetobacter calcoaceticus/baumannii complex Not Detected     Bacteroides fragilis Not Detected     Enterobacterales Not Detected     Enterobacter cloacae complex Not Detected     Escherichia coli Not Detected     Klebsiella aerogenes Not Detected     Klebsiella oxytoca Not Detected     Klebsiella pneumoniae group Not Detected     Proteus Not Detected     Salmonella sp Not Detected     Serratia marcescens Not Detected     Haemophilus influenzae Not Detected     Neisseria meningtidis Not Detected     Pseudomonas aeruginosa Not Detected     Stenotrophomonas maltophilia Not Detected     Candida albicans Not Detected     Candida auris Not Detected     Candida glabrata Not Detected     Candida krusei Not Detected     Candida parapsilosis Not Detected     Candida tropicalis Not Detected     Cryptococcus neoformans/gattii Not Detected     CTX-M (ESBL ) Test Not Applicable     IMP (Carbapenem resistant) Test Not Applicable     KPC resistance gene (Carbapenem resistant) Test Not Applicable     mcr-1  Test Not Applicable     mec A/C  Not Detected     mec A/C and MREJ (MRSA) gene Test Not Applicable     NDM (Carbapenem resistant) Test Not Applicable     OXA-48-like (Carbapenem resistant) Test Not Applicable     van A/B (VRE gene) Test Not Applicable     VIM (Carbapenem resistant) Test Not Applicable            Significant Imaging: I have reviewed all pertinent imaging results/findings within the past 24 hours.

## 2024-01-17 NOTE — ASSESSMENT & PLAN NOTE
#Bradycardia    73yoF with ESRD admitted for bronchial tumor removal with post op course complicated by atrial fibrillation. She had difficult to control rates but further post-operatively, her rates improved so was weaned off rate controlling agents. Overnight 1/11-1/12 and morning of 1/12 she had several bradycardic episodes in the setting of atrial fibrillation, recent digoxin use (reportedly renally dosed, but some high levels with ESRD).    In the setting of asystolic events with several short rounds of compressions (no more than 1 round at a time), TVP was attempted. 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 She is post PTA and stenting of the left innominate vein. This was done at an outside hospital. Micra implantation was decided on due to infection risk and difficult venous anatomy.    EF 60-65% during this admission  Jan 13, 2024 EKG: Sinus rhythm, QRS 76    Recommendations:  - Micra in placed 1/16  - Primary planning for thoracentesis soon with IR

## 2024-01-17 NOTE — PROGRESS NOTES
[facility]  Endocrinology  Progress Note    Admit Date: 1/2/2024     Reason for Consult: Management of T2DM, Hyperglycemia     Surgical Procedure and Date: 1/2/24--   LYMPHADENECTOMY (Right)  THORACOTOMY (Right)  THORACOTOMY, WITH INITIAL THERAPEUTIC WEDGE RESECTION OF LUNG (Right)    Diabetes diagnosis year: in her 50s    Home Diabetes Medications:    LDC SSI   Blood sugar 150 to 200, + 1 unit  Blood sugar 201 to 250, + 2 units  Blood sugar 251 to 300, + 3 units  Blood sugar 301 to 350, + 4 units   Blood sugar greater than 350, + 5 units    How often checking glucose at home? >4 x day   BG readings on regimen: 100-120s  Hypoglycemia on the regimen?  denies   Missed doses on regimen?  No    Diabetes Complications include:     Nephropathy     Complicating diabetes co morbidities:   ESRD and Glucocorticoid use       HPI:   Patient is a 73 y.o. female with DDD, benign essential tremor, meniere's disease, HTN, HLD, DM2, GERD, Celiac's, IBS who is found to have RML Carcinoid tumor.  Chest CT 10/18/23 revealed 1.2 x 1.1 cm right middle lobe nodule, previously 1.2 x 1.0 cm in July 2023. Underwent Robotic Bronchoscopy 10/20/23; path: RML nodule positive for well-differentiated neuroendocrine neoplasm, carcinoid tumor. PET/CT Cu64 11/17/23 showed known RML nodule with hypermetabolic activity. Now presents s/p thoracotomy with wedge resection of right lung. Endocrine consulted for BG management.         Interval HPI:   No acute events overnight. Patient in room 12094 CVICU/70672 CVICU A. Blood glucose variable. BG at/below goal on current insulin regimen (SSI and basal). Steroid use- None.   4 Days Post-Op  Renal function- Normal   Vasopressors-  None     Diet NPO Except for: Sips with Medication     Eating:   NPO  Nausea: No  Hypoglycemia and intervention: No  Fever: No  TPN and/or TF: No    BP (!) 164/77 (BP Location: Right arm, Patient Position: Lying)   Pulse 76   Temp 98 °F (36.7 °C) (Oral)    "Resp (!) 29   Ht 5' 5" (1.651 m)   Wt 68.9 kg (152 lb)   LMP  (LMP Unknown) Comment: BEAU/ NO BSO  1986  SpO2 97%   Breastfeeding No   BMI 25.29 kg/m²     Labs Reviewed and Include    Recent Labs   Lab 01/16/24  0255   *   CALCIUM 8.4*   ALBUMIN 2.3*   PROT 6.0      K 3.8   CO2 22*      BUN 8   CREATININE 0.7   ALKPHOS 158*   ALT <5*   AST 21   BILITOT 0.8     Lab Results   Component Value Date    WBC 30.10 (H) 01/16/2024    HGB 7.7 (L) 01/16/2024    HCT 23.2 (L) 01/16/2024    MCV 86 01/16/2024     01/16/2024     No results for input(s): "TSH", "FREET4" in the last 168 hours.  Lab Results   Component Value Date    HGBA1C 4.9 11/15/2023       Nutritional status:   Body mass index is 25.29 kg/m².  Lab Results   Component Value Date    ALBUMIN 2.3 (L) 01/16/2024    ALBUMIN 2.3 (L) 01/15/2024    ALBUMIN 2.3 (L) 01/15/2024     No results found for: "PREALBUMIN"    Estimated Creatinine Clearance: 69.8 mL/min (based on SCr of 0.7 mg/dL).    Accu-Checks  Recent Labs     01/13/24  2108 01/14/24  0838 01/14/24  1055 01/14/24  2058 01/15/24  0832 01/15/24  1112 01/15/24  1157 01/15/24  1605 01/15/24  2059 01/16/24  0905   POCTGLUCOSE 173* 181* 197* 323* 217* 216* 204* 234* 241* 186*       Current Medications and/or Treatments Impacting Glycemic Control  Immunotherapy:    Immunosuppressants       None          Steroids:   Hormones (From admission, onward)      None          Pressors:    Autonomic Drugs (From admission, onward)      None          Hyperglycemia/Diabetes Medications:   Antihyperglycemics (From admission, onward)      Start     Stop Route Frequency Ordered    01/15/24 1015  insulin detemir U-100 (Levemir) pen 10 Units         -- SubQ Daily 01/15/24 0913    01/11/24 0824  insulin aspart U-100 pen 0-10 Units         -- SubQ Every 4 hours PRN 01/11/24 0724            ASSESSMENT and PLAN    Renal/  ESRD (end stage renal disease)    Titrate insulin slowly to avoid hypoglycemia as the " risk of hypoglycemia increases with decreased creatinine clearance.      Oncology  * Malignant carcinoid tumor of bronchus  Managed per primary team        Endocrine  Type 2 diabetes mellitus with diabetic polyneuropathy, without long-term current use of insulin  BG goal: 140-180. Minimal PO intake in the last couple of days (NPO for procedures), fasting BG trending below goal today.     - Levemir 6 units daily (33% decrease due to FBG below goal)   - Novolog Mod dose correction with ISF 25 starting at 150 prn q 4 hr   - POCT Glucose every 4 hours while NPO   - Hypoglycemia protocol in place      ** Please notify Endocrine for any change and/or advance in diet**  ** Please call Endocrine for any BG related issues **     Discharge Planning:   TBD. Please notify endocrinology prior to discharge.            Hue Lu PA-C  Endocrinology  [facility]

## 2024-01-17 NOTE — TRANSFER OF CARE
"Anesthesia Transfer of Care Note    Patient: Lily Green    Procedure(s) Performed: * No procedures listed *    Patient location: ICU    Anesthesia Type: general    Transport from OR: Transported from OR on room air with adequate spontaneous ventilation    Post pain: adequate analgesia    Post assessment: no apparent anesthetic complications    Post vital signs: stable    Level of consciousness: awake, alert and oriented    Nausea/Vomiting: no nausea/vomiting    Complications: none    Transfer of care protocol was followed      Last vitals: Visit Vitals  /63 (BP Location: Right arm, Patient Position: Lying)   Pulse 64   Temp 36.4 °C (97.6 °F) (Oral)   Resp 15   Ht 5' 5" (1.651 m)   Wt 77 kg (169 lb 12.1 oz)   LMP  (LMP Unknown)   SpO2 100%   Breastfeeding No   BMI 28.25 kg/m²     "

## 2024-01-17 NOTE — PROGRESS NOTES
[facility]  Thoracic Surgery  Progress Note    Subjective:     History of Present Illness:  No notes on file    Post-Op Info:  Procedure(s) (LRB):  INSERTION, CARDIAC PACEMAKER, LEADLESS (N/A)   1 Day Post-Op     Interval History: s/p micra placement with EP 1/16. Still with very transient bradycardic episodes w/ significant symptoms. For IR thoracentesis/CT placement today      Medications:  Continuous Infusions:   sodium chloride 0.9% 10 mL/hr at 01/16/24 1200     Scheduled Meds:   acetylcysteine 100 mg/ml (10%)  4 mL Nebulization TID WAKE    droNABinol  5 mg Oral BID    epoetin noble (PROCRIT) injection  4,000 Units Intravenous Every Mon, Wed, Fri    [START ON 1/18/2024] insulin detemir U-100  12 Units Subcutaneous Daily    LIDOcaine  1 patch Transdermal Q24H    meropenem (MERREM) IVPB  500 mg Intravenous Q12H    oxybutynin  5 mg Oral TID    pantoprazole  40 mg Oral Daily    polyethylene glycol  17 g Oral Daily    senna-docusate 8.6-50 mg  1 tablet Oral Daily     PRN Meds:sodium chloride 0.9%, albuterol sulfate, bisacodyL, dextrose 10%, dextrose 10%, glucagon (human recombinant), glucose, glucose, heparin (porcine) 2,000 Units in sodium chloride 0.9% 1,000 mL, heparin (porcine), heparin (porcine), hydrALAZINE, insulin aspart U-100, iodixanoL, LIDOcaine HCL 20 mg/ml (2%), ondansetron, sodium chloride 0.9%     Review of patient's allergies indicates:   Allergen Reactions    Crestor [rosuvastatin] Swelling    Gluten protein      Objective:     Vital Signs (Most Recent):  Temp: 97.6 °F (36.4 °C) (01/17/24 1100)  Pulse: 74 (01/17/24 1300)  Resp: (!) 22 (01/17/24 1300)  BP: (!) 145/67 (01/17/24 1300)  SpO2: 100 % (01/17/24 1300) Vital Signs (24h Range):  Temp:  [97.4 °F (36.3 °C)-97.6 °F (36.4 °C)] 97.6 °F (36.4 °C)  Pulse:  [41-91] 74  Resp:  [0-49] 22  SpO2:  [67 %-100 %] 100 %  BP: ()/(50-79) 145/67     Intake/Output - Last 3 Shifts         01/15 0700  01/16 0659 01/16 0700 01/17 0659  01/17 0700  01/18 0659    P.O. 480      I.V. (mL/kg) 2925.4 (42.5) 73.6 (1)     IV Piggyback 530.5 607.9     Total Intake(mL/kg) 3936 (57.1) 681.5 (8.9)     Other 3873      Stool 0      Total Output 3873      Net +63 +681.5            Stool Occurrence 1 x 1 x             SpO2: 100 %       Physical Exam  Vitals and nursing note reviewed.   Constitutional:       Appearance: Normal appearance.   HENT:      Head: Normocephalic and atraumatic.      Nose: Nose normal.   Cardiovascular:      Rate and Rhythm: Normal rate and regular rhythm.      Pulses: Normal pulses.   Pulmonary:      Effort: Pulmonary effort is normal. No respiratory distress.      Comments: Right posterior chest incision c/d/I    Right chest permacath  Abdominal:      General: Abdomen is flat. There is no distension.      Palpations: Abdomen is soft.      Tenderness: There is no abdominal tenderness.      Comments: Soft, nontender abdomen   Musculoskeletal:      Right lower leg: Edema present.      Left lower leg: Edema present.   Skin:     General: Skin is warm and dry.      Coloration: Skin is pale.      Comments: Groin soft, dressings c/d/i   Neurological:      General: No focal deficit present.      Mental Status: She is alert.            Significant Labs:  CBC:   Recent Labs   Lab 01/17/24  0316   WBC 23.53*   RBC 2.63*   HGB 7.6*   HCT 23.2*      MCV 88   MCH 28.9   MCHC 32.8         Significant Diagnostics:  I have reviewed and interpreted all pertinent imaging results/findings within the past 24 hours.    VTE Risk Mitigation (From admission, onward)           Ordered     heparin (porcine) 2,000 Units in sodium chloride 0.9% 1,000 mL  As needed (PRN)         01/16/24 1307     heparin infusion 1,000 units/500 ml in 0.9% NaCl (on sterile field)  As needed (PRN)         01/16/24 1307     heparin 25,000 units in dextrose 5% 250 mL (100 units/mL) infusion LOW INTENSITY nomogram - [facility]  Continuous        Question:  Begin at (units/kg/hr)  Answer:   16    01/15/24 1038     heparin (porcine) injection 1,000 Units  As needed (PRN)         01/11/24 1351     heparin (porcine) injection 1,000 Units  As needed (PRN)         01/03/24 1123     IP VTE HIGH RISK PATIENT  Once         01/02/24 1226     Place CARMELITA hose  Until discontinued         01/02/24 1226     Place sequential compression device  Until discontinued         01/02/24 1226                  Assessment/Plan:     * Malignant carcinoid tumor of bronchus  74 yo female with ESRD (TTS), DDD, benign essential tremor, meniere's disease, HTN, HLD, DM2, GERD, Celiac's, IBS and 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 Stepped up to the ICU 1/5 for afib RVR which was refractory to medical management and c/b development of tachy-eulogio syndrome now s/p micra placement 1/16 for tachy-eulogio syndrome in the setting of RVR. Did have brief SBO type picture 1/7 which resolved with conservative management.    - s/p R thoracotomy with R lower lobectomy for carcinoid w/ MLND; IPV injury requiring repair. CT removed Post operatively.  - Refractory afib RVR requiring ICU step up 1/05, s/p chemical cardioversion initially with recurrence c/b developing tachy-eulogio syndrome    - cards and EP following, appreciate assistance    - s/p TVP placement and now micra implantation 1/16   - hold hep gtt 5 days post op  - CT chest 1/13 w/ bilateral pleural effusion (R>L), pericardial effusion,   - IR for thoracentesis and possible drain placement for R apical and baslar pleural effusions today; consider L side as needed at separate time   - Pulmonary toilet: IS, Acapella, Mucomyst daily   - Infectious work up 2/2 leukocytosis. She remains afebrile. Effusions and compressive atelectasis also likely contributing to leukocytosis.Trended up to 30k 1/16, now downtrending. Zosyn 1/11-1/15; Meropenam 1/16 -   - ID following, appreciate assistance. Reconsult following valeria  results.   - Bcx 1/11: Staph, probable contaminant  - Bcx1/12: NGTD   - Rapid blood PCR: staph epi  - Sputum Cx 1/11: klebsiella & e. Coli  - SBO type episode 1/07 requiring NGT decompression, which resolved with conservative management   - Renal, gluten free diet, novasource renal BID; marinol for appetite   - Bowel regimen, PPI  - Nephrology following, appreciate assistance. CRRT 1/15 overnight.   - Oxybutinin for bladder spasms   - MM pain control as needed  - Needs PT/OT, mobilization, likely post hospital placement       Dispo: continue ICU care; work towards stepdown/placement as arrythmias come under control and she begins to normalize         Kat Flores MD  Thoracic Surgery  [facility]

## 2024-01-17 NOTE — H&P
Please see IR consult note dated 1/16/24 for full details. Patient to undergo IR guided RIGHT chest tube placement, with anesthesia, today 1/17/2024. Need for anesthesia due to episodes of bradycardia overnight per IR nurses. Consent was obtained from caregiver at bedside.      ASA: per anesthesia  Mallampati: per anesthesia  Plan: IR Guided RIGHT chest tube    Ingris Fu PA-C  Interventional Radiology  Spectra [id number]  1/17/2024

## 2024-01-17 NOTE — ASSESSMENT & PLAN NOTE
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 Stepped up to the ICU 1/5 for afib RVR which was refractory to medical management and c/b development of tachy-eulogio syndrome now s/p micra placement 1/16 for tachy-eulogio syndrome in the setting of RVR. Did have brief SBO type picture 1/7 which resolved with conservative management. EGD 1/24 for suspected upper GI bleed was terminated due to presence of food in the stomach.     - s/p R thoracotomy with R lower lobectomy for carcinoid w/ MLND; IPV injury requiring repair. CT removed post operatively.  - Refractory afib RVR requiring ICU step up 1/05, s/p chemical cardioversion initially with recurrence c/b developing tachy-eulogio syndrome    - cards and EP following, appreciate assistance            - AC indicated 2/2 intermittent atrial fib episodes and chadsvasc score of 2    - AC currently held in the setting of resolving/resolved GI bleed    - s/p TVP placement and now micra implantation 1/16    - Heparin gtt resumed 1/22; will need to transition to eliquis likely following CT removal; held 1/23 2/2 GI bleed   - CT chest 1/13 w/ bilateral pleural effusion (R>L), pericardial effusion,   - s/p R chest tube placement with IR 1/17, L chest tube placement 1/18  - Remove left chest tube today, keep right to water seal  - Daily CXR while CT are in place   - Pulmonary toilet: IS, Acapella, Mucomyst daily   - Infectious work up 2/2 leukocytosis. She remains afebrile. Effusions and compressive atelectasis also likely contributing to leukocytosis.Trended up to 30k 1/16, now downtrending. Zosyn 1/11-1/15; Meropenam 1/16 - 1/20; Plan for Ceftriaxone and metronidazole to start 1/21 per ID. 4 week abx plan for empiric treatment of presumed parapneumonic effusion     - ID following, appreciate  assistance. Reconsult following thora results.   - Bcx 1/11: Staph, probable contaminant  - Bcx1/12: NGTD   - Rapid blood PCR: staph epi  - Sputum Cx 1/11: klebsiella & e. Coli  - R pleural fluid Cx 1/17: NGTD  - SBO type episode 1/07 requiring NGT decompression, which resolved with conservative management   - GI bleed 1/23/2024 - heparin held, 2uPRBC, 1uFFP, heparin reversed. GI consulted                       - EGD 1/24 terminated due to food in the stomach    - Promotility agent started for gastroparesis   - Renal, gluten free diet, novasource renal BID; marinol for appetite   - Bowel regimen, PPI  - Nephrology following, appreciate assistance. CRRT 1/17 overnight. HD trial 1/19 passed but requiring midodrine, 1.5 L LR, metoprolol for RVR. HD 1/22 overnight tolerated much better with midodrine given prior            - LUE AVF created 10.2023, US 1/22/2023 with good flow metrics, okay to use per vascular   - Oxybutinin for bladder spasms   - MM pain control as needed  - Continue PT/OT, mobilization, likely post hospital placement         Dispo: Okay for step down today

## 2024-01-17 NOTE — ASSESSMENT & PLAN NOTE
"I have reviewed hospital notes from  SIC service and other specialty providers. I have also reviewed CBC, CMP/BMP,  cultures and imaging with my interpretation as documented.     Admitted for carcinoid tumor lymphadenectomy,  robotic-assisted surgery converted to right thoracotomy with therapeutic wedge resection and open mediastinal lymph node dissection on 1/2. Hospital course complicated by Afib with RVR, ileus managed with NGT which has since been removed, and tachy-eulogio syndrome requiring temporary pacemaker. Now with multi loculated pleural effusion presumptively parapneumonic in etiology. However, diagnostic work up to assess whether effusion is infectious or non infectious not yet performed.     Re-consulted on 1/16 for "OK" to proceed with pacemaker placement. S/P PPM on 1/16. Afebrile and with stable to slightly down trending leukocytosis.   Can continue meropenem for now.  Please send pleural effusion aspirate for cell count, gram stain, culture, AFB stain and culture, as well as pathology.  Discussed with patient and family at bedside.   Discussed management plan with the staff and/or members from SICU service.  "

## 2024-01-17 NOTE — SUBJECTIVE & OBJECTIVE
Interval History:  Tele with sinus rhythm, bradycardic episodes to backup pacing rate. Pacing at 40bpm when needed transiently and back to her native rate. Hemodynamically stable during these episodes but significantly symptomatic.    Review of Systems   Constitutional: Negative for diaphoresis and fever.   Cardiovascular:  Negative for chest pain, dyspnea on exertion, leg swelling, near-syncope, orthopnea, palpitations, paroxysmal nocturnal dyspnea and syncope.   Respiratory:  Negative for cough and shortness of breath.    Gastrointestinal:  Negative for abdominal pain, diarrhea, nausea and vomiting.   Neurological:  Negative for light-headedness.   Psychiatric/Behavioral:  Negative for altered mental status and substance abuse.      Objective:     Vital Signs (Most Recent):  Temp: 97.5 °F (36.4 °C) (01/17/24 0305)  Pulse: 73 (01/17/24 1015)  Resp: (!) 27 (01/17/24 1015)  BP: 139/64 (01/17/24 1000)  SpO2: 99 % (01/17/24 1015) Vital Signs (24h Range):  Temp:  [97.4 °F (36.3 °C)-97.6 °F (36.4 °C)] 97.5 °F (36.4 °C)  Pulse:  [41-90] 73  Resp:  [0-39] 27  SpO2:  [67 %-100 %] 99 %  BP: ()/(50-79) 139/64     Weight: 77 kg (169 lb 12.1 oz)  Body mass index is 28.25 kg/m².     SpO2: 99 %        Physical Exam  Constitutional:       Appearance: Normal appearance.   Cardiovascular:      Rate and Rhythm: Normal rate and regular rhythm.      Pulses: Normal pulses.      Heart sounds: Normal heart sounds.      Comments: TVP in place  Pulmonary:      Effort: Pulmonary effort is normal.      Breath sounds: Normal breath sounds. No wheezing or rales.   Abdominal:      General: Abdomen is flat. There is no distension.      Palpations: Abdomen is soft.      Tenderness: There is no abdominal tenderness.   Musculoskeletal:      Right lower leg: No edema.      Left lower leg: No edema.   Skin:     General: Skin is warm and dry.   Neurological:      Mental Status: She is alert and oriented to person, place, and time. Mental status  is at baseline.   Psychiatric:         Mood and Affect: Mood normal.         Behavior: Behavior normal.            Significant Labs: All pertinent lab results from the last 24 hours have been reviewed.    Significant Imaging:  Reviewed

## 2024-01-17 NOTE — PROCEDURES
VIR Post-Procedure Note    Pre Op Diagnosis:  Loculated pleural effusion    Post Op Diagnosis: same    Procedure: Image-guided Chest Tube Placement    Procedure performed by:  Navya Richards MD / Darcy Vee MD     Written Informed Consent Obtained:  Yes    Specimen Removed:  Yes    Estimated Blood Loss:  Minimal     Findings:     Local anesthesia and moderate sedation were used.    Successful placement of 10 Fr chest tube in the R pleural space.    The patient tolerated the procedure well and there were no complications.  Please see Imaging report for further details.       Navya Richards MD MSCR  PGY-5 Radiology Resident

## 2024-01-17 NOTE — PLAN OF CARE
"      SICU PLAN OF CARE NOTE    Dx: Malignant carcinoid tumor of bronchus    Vital Signs: /63 (BP Location: Right arm, Patient Position: Lying)   Pulse 77   Temp 97.6 °F (36.4 °C) (Oral)   Resp 15   Ht 5' 5" (1.651 m)   Wt 68.9 kg (152 lb)   LMP  (LMP Unknown) Comment: BEAU/ NO BSO  1986  SpO2 (!) 94%   Breastfeeding No   BMI 25.29 kg/m²     SHIFT EVENTS:  VSS. NPO at midnight; plans to go to IR for chest tube placement today.    Neuro: AAO x4, Follows Commands, and Moves All Extremities    Respiratory: Nasal Cannula 4L    Cardiac: NSR; HR 70-80s; bradycardic events to the 40s.    Diet: NPO    Urine Output: Anuric 0 ml/shift     Labs: RFP and Mg Q8; daily    Accuchecks: Q4.    SKIN NOTE:  Skin: No new skin tears or skin breakdown noted at this time.    Skin precautions maintained including:  Sacrum and heels with foam dressing in place for pressure protection. Frequent weight shift assistance provided Q2 hr to prevent breakdown. Bed plugged in and mattress inflated; Adhesive use limited. Heels elevated off bed. Pressure points protected and positioning supports utilized.  Skin-to-device areas padded. Skin-to-skin areas padded    POC reviewed with patient and family; questions and concerns addressed. See flow sheets for full assessment details.     "

## 2024-01-17 NOTE — SUBJECTIVE & OBJECTIVE
Interval History: EGD terminated yesterday due to persistent food in the stomach despite appropriate NPO status. No acute events overnight. Remains hemodynamically stable with anticoagulation held. One dark BM overnight, Hg stable. Decreased CT outputs.      Medications:  Continuous Infusions:      Scheduled Meds:   cefpodoxime  200 mg Oral QHS    droNABinol  5 mg Oral BID    epoetin noble (PROCRIT) injection  4,000 Units Intravenous Every Mon, Wed, Fri    erythromycin ethylsuccinate  200 mg Oral QID (WM & HS)    [START ON 1/26/2024] fluconazole  200 mg Oral Daily    insulin aspart U-100  3 Units Subcutaneous TIDWM    insulin detemir U-100  7 Units Subcutaneous Daily    LIDOcaine  1 patch Transdermal Q24H    metoprolol tartrate  12.5 mg Oral BID    metroNIDAZOLE  500 mg Oral Q12H    midodrine  10 mg Oral BID WM    oxybutynin  5 mg Oral TID    pantoprazole  40 mg Intravenous BID    polyethylene glycol  17 g Oral Daily    senna-docusate 8.6-50 mg  1 tablet Oral Daily    sodium chloride 0.9%  10 mL Intravenous Q6H    venlafaxine  37.5 mg Oral Daily     PRN Meds:sodium chloride 0.9%, acetaminophen, albuterol sulfate, bisacodyL, dextrose 10%, dextrose 10%, glucagon (human recombinant), glucose, glucose, heparin (porcine), hydrALAZINE, insulin aspart U-100, midodrine, ondansetron, sodium chloride 0.9%, Flushing PICC/Midline Protocol **AND** sodium chloride 0.9% **AND** sodium chloride 0.9%     Objective:     Vital Signs (Most Recent):  Temp: 97.6 °F (36.4 °C) (01/25/24 0700)  Pulse: 72 (01/25/24 0900)  Resp: 17 (01/25/24 0900)  BP: (!) 110/53 (01/25/24 0900)  SpO2: 100 % (01/25/24 0900) Vital Signs (24h Range):  Temp:  [97.4 °F (36.3 °C)-98.1 °F (36.7 °C)] 97.6 °F (36.4 °C)  Pulse:  [64-90] 72  Resp:  [9-21] 17  SpO2:  [87 %-100 %] 100 %  BP: ()/(42-72) 110/53     Weight: 67.6 kg (149 lb)  Body mass index is 24.79 kg/m².    SpO2: 100 %       Intake/Output - Last 3 Shifts         01/23 0700  01/24 0659 01/24  0700  01/25 0659 01/25 0700  01/26 0659    P.O. 282 350 222    I.V. (mL/kg) 18.6 (0.3)      Blood 286      Other       IV Piggyback  50     Total Intake(mL/kg) 586.6 (8.7) 400 (5.9) 222 (3.3)    Urine (mL/kg/hr) 0 (0)      Other  1600     Stool 0 0     Chest Tube 620 280 20    Total Output 620 1880 20    Net -33.4 -1480 +202           Urine Occurrence 1 x      Stool Occurrence 7 x 1 x             Lines/Drains/Airways       Central Venous Catheter Line  Duration                  Hemodialysis Catheter 01/25/23 1501 right internal jugular 364 days              Drain  Duration                  Chest Tube 01/17/24 1504 Right Pleural 10 Fr. 7 days         Chest Tube 01/18/24 1537 Left Pleural 10 Fr. 6 days              Peripheral Intravenous Line  Duration                  Hemodialysis AV Fistula 09/01/23 0800 Left forearm 146 days         Midline Catheter Insertion/Assessment  - Single Lumen 01/22/24 1707 Right basilic vein (medial side of arm) 18g x 10cm 2 days                       Physical Exam  Vitals and nursing note reviewed.   Constitutional:       Appearance: No acute distress  Cardiovascular:      Rate and Rhythm: Normal rate  Pulmonary:      Effort: Pulmonary effort is normal. No respiratory distress.      Comments: Serous CT output bilaterally  Abdominal:      General: Abdomen is flat. There is no distension.   Skin:     General: Skin is warm and dry.   Neurological:      General: No focal deficit present.      Mental Status: She is alert.        Significant Labs:  All pertinent labs from the last 24 hours have been reviewed.    Significant Diagnostics: All pertinent imaging from the last 24 hours have been reviewed.

## 2024-01-17 NOTE — NURSING
MD Summer notified pt SpO2 drops to 60-80 when she has a bradycardic episode in the 40s-50s. SpO2 does not appear to have a good waveform and pulse rate does not correlate with EKG HR. Pulse Ox tried on multiple sites. MD to bedside to assess episode. Orders to notified if pt sustains low SpO2.

## 2024-01-17 NOTE — SUBJECTIVE & OBJECTIVE
Interval History: s/p micra placement with EP 1/16. Still with very transient bradycardic episodes w/ significant symptoms. For IR thoracentesis/CT placement today      Medications:  Continuous Infusions:   sodium chloride 0.9% 10 mL/hr at 01/16/24 1200     Scheduled Meds:   acetylcysteine 100 mg/ml (10%)  4 mL Nebulization TID WAKE    droNABinol  5 mg Oral BID    epoetin noble (PROCRIT) injection  4,000 Units Intravenous Every Mon, Wed, Fri    [START ON 1/18/2024] insulin detemir U-100  12 Units Subcutaneous Daily    LIDOcaine  1 patch Transdermal Q24H    meropenem (MERREM) IVPB  500 mg Intravenous Q12H    oxybutynin  5 mg Oral TID    pantoprazole  40 mg Oral Daily    polyethylene glycol  17 g Oral Daily    senna-docusate 8.6-50 mg  1 tablet Oral Daily     PRN Meds:sodium chloride 0.9%, albuterol sulfate, bisacodyL, dextrose 10%, dextrose 10%, glucagon (human recombinant), glucose, glucose, heparin (porcine) 2,000 Units in sodium chloride 0.9% 1,000 mL, heparin (porcine), heparin (porcine), hydrALAZINE, insulin aspart U-100, iodixanoL, LIDOcaine HCL 20 mg/ml (2%), ondansetron, sodium chloride 0.9%     Review of patient's allergies indicates:   Allergen Reactions    Crestor [rosuvastatin] Swelling    Gluten protein      Objective:     Vital Signs (Most Recent):  Temp: 97.6 °F (36.4 °C) (01/17/24 1100)  Pulse: 74 (01/17/24 1300)  Resp: (!) 22 (01/17/24 1300)  BP: (!) 145/67 (01/17/24 1300)  SpO2: 100 % (01/17/24 1300) Vital Signs (24h Range):  Temp:  [97.4 °F (36.3 °C)-97.6 °F (36.4 °C)] 97.6 °F (36.4 °C)  Pulse:  [41-91] 74  Resp:  [0-49] 22  SpO2:  [67 %-100 %] 100 %  BP: ()/(50-79) 145/67     Intake/Output - Last 3 Shifts         01/15 0700  01/16 0659 01/16 0700 01/17 0659 01/17 0700 01/18 0659    P.O. 480      I.V. (mL/kg) 2925.4 (42.5) 73.6 (1)     IV Piggyback 530.5 607.9     Total Intake(mL/kg) 3936 (57.1) 681.5 (8.9)     Other 3873      Stool 0      Total Output 3873      Net +63 +681.5             Stool Occurrence 1 x 1 x             SpO2: 100 %        Physical Exam  Vitals and nursing note reviewed.   Constitutional:       Appearance: Normal appearance.   HENT:      Head: Normocephalic and atraumatic.      Nose: Nose normal.   Cardiovascular:      Rate and Rhythm: Normal rate and regular rhythm.      Pulses: Normal pulses.   Pulmonary:      Effort: Pulmonary effort is normal. No respiratory distress.      Comments: Right posterior chest incision c/d/I    Right chest permacath  Abdominal:      General: Abdomen is flat. There is no distension.      Palpations: Abdomen is soft.      Tenderness: There is no abdominal tenderness.      Comments: Soft, nontender abdomen   Musculoskeletal:      Right lower leg: Edema present.      Left lower leg: Edema present.   Skin:     General: Skin is warm and dry.      Coloration: Skin is pale.      Comments: Groin soft, dressings c/d/i   Neurological:      General: No focal deficit present.      Mental Status: She is alert.            Significant Labs:  CBC:   Recent Labs   Lab 01/17/24  0316   WBC 23.53*   RBC 2.63*   HGB 7.6*   HCT 23.2*      MCV 88   MCH 28.9   MCHC 32.8         Significant Diagnostics:  I have reviewed and interpreted all pertinent imaging results/findings within the past 24 hours.    VTE Risk Mitigation (From admission, onward)           Ordered     heparin (porcine) 2,000 Units in sodium chloride 0.9% 1,000 mL  As needed (PRN)         01/16/24 1307     heparin infusion 1,000 units/500 ml in 0.9% NaCl (on sterile field)  As needed (PRN)         01/16/24 1307     heparin 25,000 units in dextrose 5% 250 mL (100 units/mL) infusion LOW INTENSITY nomogram - [facility]  Continuous        Question:  Begin at (units/kg/hr)  Answer:  16    01/15/24 1038     heparin (porcine) injection 1,000 Units  As needed (PRN)         01/11/24 1351     heparin (porcine) injection 1,000 Units  As needed (PRN)         01/03/24 1123     IP VTE HIGH RISK PATIENT  Once          01/02/24 1226     Place CARMELITA hose  Until discontinued         01/02/24 1226     Place sequential compression device  Until discontinued         01/02/24 1226

## 2024-01-17 NOTE — ANESTHESIA PREPROCEDURE EVALUATION
[facility]  Anesthesia Pre-Operative Evaluation         Patient Name: Lily Green  YOB: 1950  MRN: [id number]    SUBJECTIVE:     Pre-operative evaluation for * No procedures listed *     01/17/2024    PAPER CONSENT IN CHART    Lily Green is a 73 y.o. 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    Patient now presents for the above procedure(s).    ,Results for orders placed during the hospital encounter of 01/02/24    Echo    Interpretation Summary    Left Ventricle: The left ventricle is normal in size. Normal wall thickness. Normal wall motion. There is normal systolic function with a visually estimated ejection fraction of 60 - 65%. There is normal diastolic function.    Right Ventricle: Normal right ventricular cavity size. Wall thickness is normal. Right ventricle wall motion  is normal. Systolic function is normal.    Aortic Valve: The aortic valve is a trileaflet valve. There is moderate aortic valve sclerosis. There is mild annular calcification present. Mildly restricted motion. There is mild aortic regurgitation.    Mitral Valve: There is mild to moderate regurgitation.    Tricuspid Valve: The tricuspid valve is structurally normal. There is trace regurgitation.  No evidence of carcinoid disease of the tricuspid valve.    Pulmonary Artery: There is borderline elevated pulmonary hypertension. The estimated pulmonary artery systolic pressure is 34 mmHg.    IVC/SVC: Intermediate venous pressure at 8 mmHg.    Large left pleural effusion.    Pericardium: There is a moderate circumferential effusion, largest anterior to the tricuspid valve annulus. Pericardial effusion has focal strands. No indication of cardiac tamponade. Evidence includes no IVC dilation, no chamber collapse.      LDA:        Hemodialysis  Catheter 01/25/23 1501 right internal jugular (Active)   Line Necessity Review CRRT/HD 01/17/24 1105   Verification by X-ray Yes 01/17/24 1105   Site Assessment No drainage;No redness;No swelling;No warmth 01/17/24 1105   Line Securement Device Secured with sutures 01/17/24 1105   Dressing Type CHG impregnated dressing/sponge;Central line dressing 01/17/24 1105   Dressing Status Clean;Dry;Intact 01/17/24 1105   Dressing Intervention Integrity maintained 01/17/24 1105   Date on Dressing 01/15/24 01/17/24 1105   Dressing Due to be Changed 01/22/24 01/17/24 1105   Venous Patency/Care normal saline locked 01/17/24 1105   Arterial Patency/Care normal saline locked 01/17/24 1105   Waveform Not being transduced 01/17/24 1105   Number of days: 356            Peripheral IV - Single Lumen 01/15/24 1230 20 G;1 3/4 in Anterior;Proximal;Right Forearm (Active)   Site Assessment Draining 01/17/24 1105   Extremity Assessment Distal to IV No warmth;No swelling;No redness;No abnormal discoloration 01/17/24 1105   Line Status Infusing 01/17/24 1105   Dressing Status Old drainage 01/17/24 1105   Dressing Intervention Sterile dressing change 01/17/24 1105   Dressing Change Due 01/19/24 01/17/24 1105   Site Change Due 01/19/24 01/17/24 1105   Reason Not Rotated Not due 01/17/24 1105   Number of days: 1            Peripheral IV - Single Lumen 01/16/24 1300 20 G Anterior;Proximal;Right Forearm (Active)   Site Assessment Clean;Dry;Intact;No redness;No swelling 01/17/24 1105   Extremity Assessment Distal to IV No warmth;No swelling;No redness;No abnormal discoloration 01/17/24 1105   Line Status Saline locked 01/17/24 1105   Dressing Status Clean;Dry;Intact 01/17/24 1105   Dressing Intervention Integrity maintained 01/17/24 1105   Dressing Change Due 01/20/24 01/17/24 1105   Site Change Due 01/20/24 01/17/24 1105   Reason Not Rotated Not due 01/17/24 1105   Number of days: 0            Hemodialysis AV Fistula 09/01/23 0800 Left forearm  (Active)   Site Assessment Clean;Dry;Intact;No redness;No swelling 01/17/24 1105   Patency Thrill;Bruit;Present 01/17/24 1105   Status Not Accessed 01/17/24 1105   Site Condition No complications 01/15/24 1501   Number of days: 138       Prev airway:   Intubation     Date/Time: 1/2/2024 7:10 AM     Performed by: Marcelo Villegas MD  Authorized by: Manuel Luna MD    Intubation:     Induction:  Intravenous    Intubated:  Postinduction    Mask Ventilation:  Easy with oral airway    Attempts:  1    Attempted By:  Resident anesthesiologist    Method of Intubation:  Video laryngoscopy and fiberoptic    Blade:  [place] 3    Laryngeal View Grade: Grade IIA - cords partially seen      Difficult Airway Encountered?: No      Complications:  None    Airway Device:  Double lumen tube left    Airway Device Size:  35F    Style/Cuff Inflation:  Cuffed (inflated to minimal occlusive pressure)    Tube secured:  27    Secured at:  The lips    Placement Verified By:  Capnometry, Revisualization with laryngoscopy and Fiber optic visualization    Complicating Factors:  None    Findings Post-Intubation:  BS equal bilateral and atraumatic/condition of teeth unchanged    Drips: None documented.   sodium chloride 0.9% 10 mL/hr at 01/16/24 1200       Patient Active Problem List   Diagnosis    Type 2 diabetes mellitus with diabetic polyneuropathy, without long-term current use of insulin    Hyperlipidemia, mixed    GERD without esophagitis    Mild recurrent major depression    Primary insomnia    Chronic neck pain    DDD (degenerative disc disease), cervical    Screening mammogram, encounter for    Celiac disease    Hand arthritis    Benign essential tremor    Chronic pain of both knees    Irritable bowel syndrome    TANIA (stress urinary incontinence, female)    Meniere's disease of both ears    Ankle weakness    Psoriasis    Idiopathic neuropathy    Bilateral foot pain    Generalized anxiety disorder    Cognitive complaints     Peritonitis    Large bowel ischemia    Elevated lactic acid level    Hyponatremia    Primary hypertension    Anemia    Hypophosphatemia    Debility    Weakness    Lung nodule    Malignant carcinoid tumor of bronchus    ESRD (end stage renal disease)    Atrial fibrillation    Anticoagulated on heparin    Anuria    A-V fistula    Bradycardia    Parapneumonic effusion       Review of patient's allergies indicates:   Allergen Reactions    Crestor [rosuvastatin] Swelling    Gluten protein        Current Inpatient Medications:   acetylcysteine 100 mg/ml (10%)  4 mL Nebulization TID WAKE    droNABinol  5 mg Oral BID    epoetin noble (PROCRIT) injection  4,000 Units Intravenous Every Mon, Wed, Fri    [START ON 1/18/2024] insulin detemir U-100  12 Units Subcutaneous Daily    LIDOcaine  1 patch Transdermal Q24H    meropenem (MERREM) IVPB  500 mg Intravenous Q12H    oxybutynin  5 mg Oral TID    pantoprazole  40 mg Oral Daily    polyethylene glycol  17 g Oral Daily    senna-docusate 8.6-50 mg  1 tablet Oral Daily       No current facility-administered medications on file prior to encounter.     Current Outpatient Medications on File Prior to Encounter   Medication Sig Dispense Refill    atorvastatin (LIPITOR) 40 MG tablet Take 1 tablet (40 mg total) by mouth once daily. (Patient taking differently: Take 40 mg by mouth every evening.) 90 tablet 3    epoetin noble-epbx (RETACRIT) 10,000 unit/mL imjection Inject 1 mL (10,000 Units total) into the skin every Mon, Wed, Fri. HOLD IF HEMOGLOBIN is 10 or GREATER    DO NOT SHAKE  DO NOT DILUTE  DO NOT MIX with other drug solutions 12 mL 0    ferrous gluconate (FERGON) 324 MG tablet Take 1 tablet (324 mg total) by mouth daily with breakfast. 30 tablet 3    meclizine (ANTIVERT) 25 mg tablet Take 1 tablet (25 mg total) by mouth 3 (three) times daily as needed for Dizziness. 90 tablet 3    melatonin (MELATIN) 3 mg tablet Take 3 tablets (9 mg total) by mouth nightly as needed for Insomnia. 90  "tablet 3    pantoprazole (PROTONIX) 40 MG tablet Take 1 tablet (40 mg total) by mouth once daily. 90 tablet 3    patiromer calcium sorbitex (VELTASSA) 8.4 gram PwPk Take 2 packets (16.8 g total) by mouth once daily. 180 packet 2    sevelamer HCL (RENAGEL) 800 MG Tab Take 2 tablets (1,600 mg total) by mouth 3 (three) times daily with meals. 180 tablet 11    B complex-vitamin C-folic acid (MELLISSA-BENJY) 0.8 mg Tab Take 1 tablet (0.8 mg total) by mouth once daily. 30 tablet 3    betahistine HCl (BETAHISTINE, BULK, MISC)       blood sugar diagnostic (TRUE METRIX GLUCOSE TEST STRIP) Strp USE ONE STRIP FOR TESTING 2 TIMES A  each 11    blood-glucose meter kit Use to test twice a day 1 each 0    calcium-vitamin D3 (OS-CIPRINAO 500 + D3) 500 mg-5 mcg (200 unit) per tablet Take 1 tablet by mouth once daily. 30 tablet 3    coenzyme Q10 100 mg capsule       dicyclomine (BENTYL) 10 MG capsule       dronabinoL (MARINOL) 5 MG capsule Take 1 capsule (5 mg total) by mouth 2 (two) times daily before meals. 60 capsule 1    econazole nitrate 1 % cream Apply topically 2 (two) times daily. 30 g 2    insulin aspart U-100 (NOVOLOG) 100 unit/mL (3 mL) InPn pen Inject before meals:  150-200=+1, 201-250=+2, 251-300=+3; 301-350=+4, over 350=+5 units 15 mL 0    lancets Misc 1 lancet by Misc.(Non-Drug; Combo Route) route 4 (four) times daily. 400 each 3    mometasone (ELOCON) 0.1 % ointment Apply topically.      neomycin (MYCIFRADIN) 500 mg Tab       nutritional supplements Liqd Take 237 mLs by mouth 4 (four) times daily. 120 each 6    oxyCODONE (ROXICODONE) 5 MG immediate release tablet Take 5 mg by mouth every 4 (four) hours as needed.      pen needle, diabetic (BD ULTRA-FINE MARIS PEN NEEDLE) 32 gauge x 5/32" Ndle 1 Device by Misc.(Non-Drug; Combo Route) route 4 (four) times daily with meals and nightly. 400 each 3    pregabalin (LYRICA) 150 MG capsule TAKE ONE CAPSULE BY MOUTH 2 TIMES A DAY 60 capsule 2    pregabalin (LYRICA) 75 MG capsule " Take 1 capsule (75 mg total) by mouth Daily. Give after HD 30 capsule 0    simethicone (MYLICON) 125 mg Cap capsule Take by mouth.      vit C,U-Ir-rwssd-lutein-zeaxan (PRESERVISION AREDS 2) 733-986-89-1 mg-unit-mg-mg Cap       vitamins  A,C,E-zinc-copper 14,[id number] unit-mg-unit Cap Take 1 tablet by mouth once daily.         Past Surgical History:   Procedure Laterality Date    APPENDECTOMY      BREAST SURGERY      Tags    CARPAL TUNNEL RELEASE Bilateral 09/2017    ,     CLOSURE, COLOSTOMY Left 1/16/2023    Procedure: CLOSURE, COLOSTOMY;  Surgeon: Manuel Javier MD;  Location: [facility] OR;  Service: General;  Laterality: Left;    COLONOSCOPY  04/2018    Normal  ( Juan A)     COLOSTOMY Right 1/16/2023    Procedure: CREATION, COLOSTOMY;  Surgeon: Manuel Javier MD;  Location: [facility] OR;  Service: General;  Laterality: Right;    DILATION AND CURETTAGE OF UTERUS  1986    DUPUYTREN CONTRACTURE RELEASE Bilateral 09/2017    HYSTERECTOMY  1987    BEAU w/ appy, no BSO     INJECTION OF NEUROLYTIC AGENT AROUND LUMBAR SYMPATHETIC NERVE N/A 1/6/2022    Procedure: BLOCK, LUMBAR SYMPATHETIC;  Surgeon: Souleymane Rizo Jr., MD;  Location: [facility] PAIN MGT;  Service: Pain Management;  Laterality: N/A;  no pacemaker   pt is diabetic     INJECTION OF NEUROLYTIC AGENT AROUND LUMBAR SYMPATHETIC NERVE N/A 8/25/2022    Procedure: BLOCK, LUMBAR SYMPATHETIC;  Surgeon: Souleymane Rizo Jr., MD;  Location: [facility] PAIN MGT;  Service: Pain Management;  Laterality: N/A;  diabetic     INSERTION OF TUNNELED CENTRAL VENOUS HEMODIALYSIS CATHETER Right 1/25/2023    Procedure: INSERTION, CATHETER, HEMODIALYSIS, DUAL LUMEN;  Surgeon: Manuel Javier MD;  Location: [facility] OR;  Service: General;  Laterality: Right;    INSERTION, CATHETER, TUNNELED Left 1/28/2023    Procedure: Insertion,catheter,tunneled;  Surgeon: Watsno Fontenot MD;  Location: [facility] OR;  Service: General;  Laterality: Left;    LAPAROTOMY, EXPLORATORY N/A 1/14/2023     Procedure: LAPAROTOMY, EXPLORATORY;  Surgeon: Manuel Javier MD;  Location: [facility] OR;  Service: General;  Laterality: N/A;    LAPAROTOMY, EXPLORATORY N/A 1/16/2023    Procedure: LAPAROTOMY, EXPLORATORY;  Surgeon: Manuel Javier MD;  Location: [facility] OR;  Service: General;  Laterality: N/A;    LYMPHADENECTOMY Right 1/2/2024    Procedure: LYMPHADENECTOMY;  Surgeon: Tan Thomson MD;  Location: [facility] OR 2ND FLR;  Service: Cardiothoracic;  Laterality: Right;    PACEMAKER, TEMPORARY, TRANSVENOUS, PEDIATRIC Right 1/12/2024    Procedure: Pacemaker, Temporary, Transvenous;  Surgeon: Todd Carlisle MD;  Location: [facility] CATH LAB;  Service: Cardiology;  Laterality: Right;    REMOVAL OF CATHETER Right 1/28/2023    Procedure: REMOVAL, CATHETER;  Surgeon: Watson Fontenot MD;  Location: [facility] OR;  Service: General;  Laterality: Right;    REMOVAL, TUNNELED CATH Right 1/25/2023    Procedure: REMOVAL, TUNNELED CATH;  Surgeon: Manuel Javier MD;  Location: [facility] OR;  Service: General;  Laterality: Right;    ROBOTIC BRONCHOSCOPY N/A 10/20/2023    Procedure: ROBOTIC BRONCHOSCOPY;  Surgeon: Mayda Monzon MD;  Location: [facility] OR 2ND FLR;  Service: Pulmonary;  Laterality: N/A;    SHOULDER SURGERY  2009 & 2010    right rotator cuff    THORACOTOMY Right 1/2/2024    Procedure: THORACOTOMY;  Surgeon: Tan Thomson MD;  Location: [facility] OR 2ND FLR;  Service: Cardiothoracic;  Laterality: Right;    THORACOTOMY, WITH INITIAL THERAPEUTIC WEDGE RESECTION OF LUNG Right 1/2/2024    Procedure: THORACOTOMY, WITH INITIAL THERAPEUTIC WEDGE RESECTION OF LUNG;  Surgeon: Tan Thomson MD;  Location: [facility] OR 2ND FLR;  Service: Cardiothoracic;  Laterality: Right;    TONSILLECTOMY      UPPER GASTROINTESTINAL ENDOSCOPY  04/2018       Social History     Socioeconomic History    Marital status:    Tobacco Use    Smoking status: Never    Smokeless tobacco: Never   Substance and Sexual Activity    Alcohol use: No    Drug use: Never     Sexual activity: Not Currently     Partners: Male     Birth control/protection: See Surgical Hx     Comment: :      Social Determinants of Health     Financial Resource Strain: Low Risk  (11/22/2023)    Overall Financial Resource Strain (CARDIA)     Difficulty of Paying Living Expenses: Not hard at all   Food Insecurity: No Food Insecurity (11/22/2023)    Hunger Vital Sign     Worried About Running Out of Food in the Last Year: Never true     Ran Out of Food in the Last Year: Never true   Transportation Needs: No Transportation Needs (11/22/2023)    PRAPARE - Transportation     Lack of Transportation (Medical): No     Lack of Transportation (Non-Medical): No   Physical Activity: Insufficiently Active (11/22/2023)    Exercise Vital Sign     Days of Exercise per Week: 4 days     Minutes of Exercise per Session: 10 min   Stress: Stress Concern Present (11/22/2023)    [employer] - Occupational Stress Questionnaire     Feeling of Stress : To some extent   Social Connections: Unknown (11/22/2023)    Social Connection and Isolation Panel [NHANES]     Frequency of Communication with Friends and Family: More than three times a week     Frequency of Social Gatherings with Friends and Family: More than three times a week     Active Member of Clubs or Organizations: Yes     Attends Club or Organization Meetings: More than 4 times per year     Marital Status:    Housing Stability: Low Risk  (11/22/2023)    Housing Stability Vital Sign     Unable to Pay for Housing in the Last Year: No     Number of Places Lived in the Last Year: 1     Unstable Housing in the Last Year: No       OBJECTIVE:     Vital Signs Range (Last 24H):  Temp:  [36.3 °C (97.4 °F)-36.4 °C (97.6 °F)]   Pulse:  [41-91]   Resp:  [0-49]   BP: ()/(50-79)   SpO2:  [67 %-100 %]       Significant Labs:  Lab Results   Component Value Date    WBC 23.53 (H) 01/17/2024    HGB 7.6 (L) 01/17/2024    HCT 23.2 (L) 01/17/2024      01/17/2024    CHOL 150 11/15/2023    TRIG 201 (H) 11/15/2023    HDL 45 11/15/2023    ALT 7 (L) 01/17/2024    AST 20 01/17/2024     (L) 01/17/2024    K 4.8 01/17/2024     01/17/2024    CREATININE 1.9 (H) 01/17/2024    BUN 25 (H) 01/17/2024    CO2 22 (L) 01/17/2024    TSH 1.821 01/13/2023    INR 1.2 01/12/2024    GLUF 86 07/11/2023    HGBA1C 4.9 11/15/2023       Diagnostic Studies: No relevant studies.    EKG:   Results for orders placed or performed during the hospital encounter of 01/02/24   EKG 12-lead    Collection Time: 01/16/24  4:21 AM    Narrative    Test Reason : Z01.818,    Vent. Rate : 076 BPM     Atrial Rate : 076 BPM     P-R Int : 132 ms          QRS Dur : 086 ms      QT Int : 348 ms       P-R-T Axes : 045 026 061 degrees     QTc Int : 391 ms    Normal sinus rhythm  Low voltage QRS  Nonspecific T wave abnormality  Abnormal ECG  When compared with ECG of 13-JAN-2024 07:44,  QT has shortened  Confirmed by Sai SAGASTUME MD ([id number]) on 1/16/2024 9:03:19 AM    Referred By: CR CARLOS           Confirmed By:Sai SAGASTUME MD       2D ECHO:  TTE:  Results for orders placed or performed during the hospital encounter of 01/02/24   Echo   Result Value Ref Range    RA Width 2.85 cm    LA volume (mod) 52.06 cm3    Left Atrium Major Axis 4.58 cm    Left Atrium Minor Axis 4.81 cm    RA Major Axis 4.76 cm    LV Diastolic Volume 49.72 mL    LV Systolic Volume 18.49 mL    TR Max King 2.53 m/s    MV Peak E King 0.92 m/s    Mr max king 0.05 m/s    Ao VTI 20.69 cm    Ao peak king 1.17 m/s    LVOT peak VTI 17.46 cm    LVOT peak king 0.97 m/s    LVOT diameter 2.02 cm    AV mean gradient 3 mmHg    TAPSE 1.12 cm    RVDD 2.34 cm    LA size 3.24 cm    Ascending aorta 3.9 cm    STJ 2.95 cm    Sinus 3.28 cm    LVIDs 2.32 2.1 - 4.0 cm    Posterior Wall 1.02 0.6 - 1.1 cm    IVS 0.86 0.6 - 1.1 cm    LVIDd 3.47 (A) 3.5 - 6.0 cm    LA WIDTH 4.14 cm    FS 33 28 - 44 %    LA volume 53.50 cm3    LV mass 93.24 g    ZLVIDD -3.38      ZLVIDS -2.06     Left Ventricle Relative Wall Thickness 0.59 cm    AV valve area 2.70 cm²    AV Velocity Ratio 0.83     AV index (prosthetic) 0.84     LVOT area 3.2 cm2    LVOT stroke volume 55.93 cm3    AV peak gradient 5 mmHg    LV Systolic Volume Index 10.5 mL/m2    LV Diastolic Volume Index 28.25 mL/m2    LA Volume Index 30.4 mL/m2    LV Mass Index 53 g/m2    Triscuspid Valve Regurgitation Peak Gradient 26 mmHg    LA Volume Index (Mod) 29.6 mL/m2    IVANNA by Velocity Ratio 2.66 cm²    BSA 1.78 m2    TV resting pulmonary artery pressure 34 mmHg    RV TB RVSP 11 mmHg    Est. RA pres 8 mmHg    Narrative      Left Ventricle: The left ventricle is normal in size. Normal wall   thickness. Normal wall motion. There is normal systolic function with a   visually estimated ejection fraction of 60 - 65%. There is normal   diastolic function.    Right Ventricle: Normal right ventricular cavity size. Wall thickness   is normal. Right ventricle wall motion  is normal. Systolic function is   normal.    Aortic Valve: The aortic valve is a trileaflet valve. There is moderate   aortic valve sclerosis. There is mild annular calcification present.   Mildly restricted motion. There is mild aortic regurgitation.    Mitral Valve: There is mild to moderate regurgitation.    Tricuspid Valve: The tricuspid valve is structurally normal. There is   trace regurgitation.  No evidence of carcinoid disease of the tricuspid   valve.    Pulmonary Artery: There is borderline elevated pulmonary hypertension.   The estimated pulmonary artery systolic pressure is 34 mmHg.    IVC/SVC: Intermediate venous pressure at 8 mmHg.    Large left pleural effusion.    Pericardium: There is a moderate circumferential effusion, largest   anterior to the tricuspid valve annulus. Pericardial effusion has focal   strands. No indication of cardiac tamponade. Evidence includes no IVC   dilation, no chamber collapse.         JARVIS:  No results found for this or any  previous visit.    ASSESSMENT/PLAN:       Pre-op Assessment    I have reviewed the Patient Summary Reports.     I have reviewed the Nursing Notes.    I have reviewed the Medications.     Review of Systems  Anesthesia Hx:  No problems with previous Anesthesia   History of prior surgery of interest to airway management or planning:          Denies Family Hx of Anesthesia complications.    Denies Personal Hx of Anesthesia complications.                    Hematology/Oncology:       -- Anemia:                    --  Cancer in past history:                     Cardiovascular:      Denies Hypertension.       Denies Angina.     hyperlipidemia   ECG has been reviewed.                          Pulmonary:       Denies Shortness of breath.  Denies Recent URI.                 Renal/:  Chronic Renal Disease, ESRD                Hepatic/GI:   PUD,  GERD Denies Liver Disease.            Musculoskeletal:  Arthritis               Neurological:    Denies CVA. Neuromuscular Disease,   Denies Seizures.                                Endocrine:  Diabetes, well controlled, type 2           Psych:   anxiety depression                Physical Exam  General: Well nourished, Cooperative and Alert    Airway:  Mallampati: II   Mouth Opening: Normal  TM Distance: Normal  Tongue: Normal  Neck ROM: Normal ROM    Dental:  Intact        Anesthesia Plan  Type of Anesthesia, risks & benefits discussed:    Anesthesia Type: Gen ETT, Gen Supraglottic Airway, Gen Natural Airway  Intra-op Monitoring Plan: Standard ASA Monitors  Post Op Pain Control Plan: multimodal analgesia and IV/PO Opioids PRN  Induction:  IV  Airway Plan: Direct and Video, Post-Induction  Informed Consent: Informed consent signed with the Patient and all parties understand the risks and agree with anesthesia plan.  All questions answered.   ASA Score: 3  Day of Surgery Review of History & Physical: H&P Update referred to the surgeon/provider.    Ready For Surgery From Anesthesia  Perspective.     .

## 2024-01-17 NOTE — PROGRESS NOTES
[facility] - [facility] Intensive [facility]  Critical Care - Surgery  Progress Note    Patient Name: Lily Green  MRN: [id number]  Admission Date: 1/2/2024  Hospital Length of Stay: 15 days  Code Status: Full Code  Attending Provider: Tan Thomson MD  Primary Care Provider: Pavel Calixto MD   Principal Problem: Malignant carcinoid tumor of bronchus    Subjective:       Interval History/Significant Events:   Patient continues to to have bradycardic episodes, where she drops into the 40s and is symptomatic.  Shortly thereafter the pacemaker cases her.  We have been assured by the EP team and the device representative this is normal, we will reach out today to see if we should increase the backup pacing rate.  IR procedure may be pushed to the right based on this intermittent bradycardia and the teams hesitancy to perform the procedure without anesthesia.  White count has decreased this morning, patient remains afebrile and hemodynamically stable.  She does seem a little down emotionally, sadly.    POD 1 from micra placement  POD 15 from thoracotomy      Objective:     Vital Signs (Most Recent):  Temp: 97.6 °F (36.4 °C) (01/17/24 1100)  Pulse: 75 (01/17/24 1100)  Resp: (!) 34 (01/17/24 1100)  BP: 133/72 (01/17/24 1100)  SpO2: 98 % (01/17/24 1100) Vital Signs (24h Range):  Temp:  [97.4 °F (36.3 °C)-97.6 °F (36.4 °C)] 97.6 °F (36.4 °C)  Pulse:  [41-91] 75  Resp:  [0-49] 34  SpO2:  [67 %-100 %] 98 %  BP: ()/(50-79) 133/72     Weight: 77 kg (169 lb 12.1 oz)  Body mass index is 28.25 kg/m².      Intake/Output Summary (Last 24 hours) at 1/17/2024 1108  Last data filed at 1/17/2024 0000  Gross per 24 hour   Intake 367.87 ml   Output --   Net 367.87 ml            Physical Exam  Vitals and nursing note reviewed.   Constitutional:       General: She is not in acute distress.     Appearance: Normal appearance. She is normal weight. She is ill-appearing. She is not toxic-appearing.   HENT:      Head:  Normocephalic and atraumatic.      Right Ear: External ear normal.      Left Ear: External ear normal.      Nose: Nose normal.      Comments: Nasal cannula in place  Eyes:      General: No scleral icterus.        Right eye: No discharge.         Left eye: No discharge.      Extraocular Movements: Extraocular movements intact.      Conjunctiva/sclera: Conjunctivae normal.   Cardiovascular:      Rate and Rhythm: Normal rate and regular rhythm.   Pulmonary:      Effort: Pulmonary effort is normal. No respiratory distress.   Abdominal:      General: Abdomen is flat. There is no distension.      Palpations: Abdomen is soft. There is no mass.      Tenderness: There is no abdominal tenderness. There is no guarding.      Hernia: No hernia is present.   Genitourinary:     Comments: Groin incisions covered with gauze and Tegaderm bilaterally, no breakthrough bleeding or hematomas noted  Musculoskeletal:         General: Normal range of motion.      Cervical back: Normal range of motion and neck supple.   Skin:     General: Skin is warm and dry.   Neurological:      General: No focal deficit present.      Mental Status: She is alert and oriented to person, place, and time. Mental status is at baseline.   Psychiatric:         Mood and Affect: Mood normal.         Behavior: Behavior normal.         Thought Content: Thought content normal.            Vents:  Oxygen Concentration (%): 24 (01/15/24 0200)    Lines/Drains/Airways       Central Venous Catheter Line  Duration                  Hemodialysis Catheter 01/25/23 1501 right internal jugular 356 days              Peripheral Intravenous Line  Duration                  Hemodialysis AV Fistula 09/01/23 0800 Left forearm 138 days         Peripheral IV - Single Lumen 01/15/24 1230 20 G;1 3/4 in Anterior;Proximal;Right Forearm 1 day         Peripheral IV - Single Lumen 01/16/24 1300 20 G Anterior;Proximal;Right Forearm <1 day                    Significant Labs:    CBC/Anemia  Profile:  Recent Labs   Lab 01/16/24  0255 01/17/24  0316   WBC 30.10* 23.53*   HGB 7.7* 7.6*   HCT 23.2* 23.2*    256   MCV 86 88   RDW 19.3* 19.9*          Chemistries:  Recent Labs   Lab 01/16/24  0255 01/16/24  1532 01/16/24  2141 01/17/24  0316    137 136 135*   K 3.8 4.3 4.5 4.8    107 105 104   CO2 22* 20* 22* 22*   BUN 8 17 21 25*   CREATININE 0.7 1.5* 1.7* 1.9*   CALCIUM 8.4* 8.3* 8.4* 8.4*   ALBUMIN 2.3* 2.3* 2.3* 2.4*   PROT 6.0  --   --  5.8*   BILITOT 0.8  --   --  0.7   ALKPHOS 158*  --   --  158*   ALT <5*  --   --  7*   AST 21  --   --  20   MG 1.8 2.5 2.5 2.4   PHOS 2.4* 5.4* 5.6* 5.6*         All pertinent labs within the past 24 hours have been reviewed.    Significant Imaging:  I have reviewed all pertinent imaging results/findings within the past 24 hours.  Assessment/Plan:     Cardiac/Vascular  Atrial fibrillation  74 yo female with 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 Stepped up to the unit for persistent afib rvr. Had SBO (now resolved). 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  Pacemaker placed on 01/16.       Neuro/Psych:   -- Sedation: none  -- Pain: Lidocaine patch   -- has some baseline anxiety, no longer on meds     Cards:   -- BP goals SBP >90, MAPs >55  -- Afib RVR   - Amio and BB held post cardiac arrest   - prn iv metoprolol for sustained RVR > 130's   - heparin gtt for anticoagulation    - transition to oral regiment once procedures complete  -- Asystolic arrest   - 1/12/24 patient went asystole. Had multiple bradycardic events prior to which had self resolved. At time of asystole, nurse performed 1 min of chest compressions and achieved ROSC. STAT TVP placement unable to be obtained due to central venous stenosis. 2nd episode of asystole resolved after minimal compressions. 3rd episode of asystole resolved with singular compression.  S/p TVP via groin in Cath lab 1/12.   - TVP removed 1/16   - PPM 1/16, heparin gtt off and will continue until 1/21    Continues to have bradycardic events where she had a Eulogio down into the 40s, and then is paced at a backup rate of 50.  She is symptomatic during these events , but is hemodynamically stable     - digoxin level high --> administered digibind 1/12, transitioned to normal sinus after administration. Dig level now within normal limits x 2     Pulm:   -- Goal O2 sat > 90%  -- on 1L satting upper 90's.  -- Chest tube d/c'd 1/8/24  -- CXR with pleural effusions and RLL opacification  -- CT chest (1/13): R worse than L pleural effusion, pericardial effusion   -- IR consulted for pleural effusions -- requesting R side be addressed first (IR likely to drain tomorrow, 1/18)   -- anesthesia we will likely need to be included in this procedure due to the patient's bradycardic episodes.  This may push the procedure to tomorrow or the next day.  We will continue to reach out and communicate to the EP team and the IR team to see what we can streamline, and how we can get this patient the procedure she needs in the quickest fashion possible.  -- continue pulmonary toilet with acetylcysteine and prn albuterol nebulizer        Renal:  -- history of ESRD, dialysis dependent   - CRRT per nephrology  -- trend BUN/Cr  -- continue sevelamer TIDWM  -- patient is oliguric  -- permacath in place last exchanged 10/2023  -- fistula unable to be used  -- oxybutynin in effort to reduce possible bladder spasm triggering eulogio episodes    Recent Labs   Lab 01/16/24  1532 01/16/24  2141 01/17/24  0316   BUN 17 21 25*   CREATININE 1.5* 1.7* 1.9*           FEN / GI:   -- CT on 1/8/24 demonstrating SBO, now resolved  -- US on 1/8/24 showing distended gallbladder without signs of acute cholecystitis   - NGT removed.  - Equivocal GGC 1/10  - DC Reglan 1/17  -- PPI  -- Replace lytes as needed  -- Nutrition: diabetic/renal/gluten free  soft and bite sized diet with novasource daily  - Can restart diet if patient is not going to get her IR procedure today. Aspiration precautions are still of utmost importance. Appreciate nursing assistance with pt eating. Dietician consulted for complex dietary restriction  - home appetite stimulant dronabinol 5 mg BID      ID:   -- Tm: afebrile; WBC decreased to 23.5 from 30  -- C/f HAP with RLL consolidation  -- MRSA neg, dc vanc  -- Cont zosyn for 7 day course  -- blood cultures with 1 bottle growing staph epi, likely a contaminant.  -- possible IR procedure on pleural effusions later this week, if so would extend abx coverage 4 days after source control achieved and f/u cultures from procedure    Recent Labs   Lab 01/15/24  0201 01/16/24  0255 01/17/24  0316   WBC 26.79* 30.10* 23.53*           Heme/Onc:  -- Daily CBC  -- on heparin gtt for A-fib, hold hep 5 days after PPM 1/16 (restart 1/21)  Recent Labs   Lab 01/11/24  0358 01/12/24  0422 01/12/24  0631 01/12/24  1316 01/15/24  0201 01/15/24  1117 01/15/24  2312 01/16/24  0255 01/17/24  0316   HGB 9.0* 8.9*  --    < > 8.4*  --   --  7.7* 7.6*    312  --    < > 329  --   --  288 256   APTT 39.0* 33.9* 62.5*   < > 49.8*   < > 56.1* 22.8 23.9   INR 1.2 1.2 1.2  --   --   --   --   --   --     < > = values in this interval not displayed.           Endo:   -- history of diabetes  -- Gluc goal 140-180  -- SSI      PPx:   Feeding: diabetic/renal/gluten diet and novasource (restart after procedure)  Analgesia/Sedation:  Pain is controlled, no sedation  Thromboembolic prevention: heparin gtt held for PPM  HOB >30: yes  Stress Ulcer ppx: pantoprazole  Glucose control: Critical care goal 140-180 g/dl, SSI  Bowel reg:  having BM  Invasive Lines/Drains/Airway: Permacath, PIV x2, TVP groin line  Deescalation: none        Dispo/Code Status/Palliative:   -- SICU/ Full Code          Brooklyn Sapp MD  Critical Care - Surgery  [facility] - Surgical Intensive  Care

## 2024-01-17 NOTE — ASSESSMENT & PLAN NOTE
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 Stepped up to the ICU 1/5 for afib RVR which was refractory to medical management and c/b development of tachy-eulogio syndrome now s/p micra placement 1/16 for tachy-eulogio syndrome in the setting of RVR. Did have brief SBO type picture 1/7 which resolved with conservative management.    - s/p R thoracotomy with R lower lobectomy for carcinoid w/ MLND; IPV injury requiring repair. CT removed Post operatively.  - Refractory afib RVR requiring ICU step up 1/05, s/p chemical cardioversion initially with recurrence c/b developing tachy-eulogio syndrome    - cards and EP following, appreciate assistance    - s/p TVP placement and now micra implantation 1/16   - hold hep gtt 5 days post op  - CT chest 1/13 w/ bilateral pleural effusion (R>L), pericardial effusion,   - IR for thoracentesis and possible drain placement for R apical and baslar pleural effusions today; consider L side as needed at separate time   - Pulmonary toilet: IS, Acapella, Mucomyst daily   - Infectious work up 2/2 leukocytosis. She remains afebrile. Effusions and compressive atelectasis also likely contributing to leukocytosis.Trended up to 30k 1/16, now downtrending. Zosyn 1/11-1/15; Meropenam 1/16 -   - ID following, appreciate assistance. Reconsult following thora results.   - Bcx 1/11: Staph, probable contaminant  - Bcx1/12: NGTD   - Rapid blood PCR: staph epi  - Sputum Cx 1/11: klebsiella & e. Coli  - SBO type episode 1/07 requiring NGT decompression, which resolved with conservative management   - Renal, gluten free diet, novasource renal BID; marinol for appetite   - Bowel regimen, PPI  - Nephrology following, appreciate assistance. CRRT 1/15 overnight.   - Oxybutinin for bladder spasms   - MM pain control as  needed  - Needs PT/OT, mobilization, likely post hospital placement       Dispo: continue ICU care; work towards stepdown/placement as arrythmias come under control and she begins to normalize

## 2024-01-17 NOTE — ASSESSMENT & PLAN NOTE
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 On HD ~ 1 year. Last HD prior to presentation 1/1/24. Pt has NELY AVF placed on 10/23 that has not been cleared by vascular. Currently using RIJ TDC.     During her admission: stepped up to ICU for Afib with RVR. Was started on BB, amio and digoxin. However 1/12/24, had multiple episodes of eulogio with few asystole requiring chest compressions. Digoxin level elevated and dig-meaghan was given with resulting decrease in levels.     Nephrology History  iHD Schedule: TTS   Unit/MD: [facility]/ Dr. Vyas  Duration: 3.5 hours   EDW: 58 kg   Access: RIJ TDC  Residual Renal Function: minimal       Volume status: net positive this admission (records indicate 5 liters)    Assessment:     -Plan for 6 hour SLED, UF goal 0.5-1L  -Keep MAP > 65  -Keep hemoglobin > 7  -Strict ins and outs  -Avoid nephrotoxic agents if possible and renally dose medications  -Avoid drastic hemodynamic changes if possible

## 2024-01-17 NOTE — PT/OT/SLP PROGRESS
Physical Therapy Treatment    Patient Name:  Lily Green   MRN:  [id number]    Recommendations:     Discharge Recommendations: Low Intensity Therapy  Discharge Equipment Recommendations: none  Barriers to discharge: None    Assessment:     Lily Green is a 73 y.o. female admitted with a medical diagnosis of Malignant carcinoid tumor of bronchus.  She presents with the following impairments/functional limitations: weakness, impaired balance, decreased safety awareness, impaired endurance, impaired functional mobility, gait instability, decreased lower extremity function pt tolerated treatment well and will benefit from cont skilled PT 3x/wk to progress physically.  Pt is significantly below previous functional level, increased risk of falls and increased burden of care currently.  Patient continues to demonstrate the need for low intensity therapy on a scheduled basis exhibited by decreased independence with functional mobility pt is s/p PPM placement 1/16 through groin with no pacemaker precautions.     Rehab Prognosis: Good; patient would benefit from acute skilled PT services to address these deficits and reach maximum level of function.    Recent Surgery: Procedure(s) (LRB):  INSERTION, CARDIAC PACEMAKER, LEADLESS (N/A) 1 Day Post-Op    Plan:     During this hospitalization, patient to be seen 3 x/week to address the identified rehab impairments via gait training, therapeutic activities and progress toward the following goals:    Plan of Care Expires:  02/08/24    Subjective     Chief Complaint: pt indicated that she was afraid to move,   Patient/Family Comments/goals: to return home   Pain/Comfort:  Pain Rating 1: 0/10  Pain Rating Post-Intervention 1: 0/10      Objective:     Communicated with nurse  prior to session.  Patient found supine with telemetry, pulse ox (continuous), blood pressure cuff, oxygen, SCD (port a cath) upon PT entry to room.     General Precautions: Standard,  fall  Orthopedic Precautions: N/A  Braces: N/A  Respiratory Status: Nasal cannula, flow 2 L/min     Functional Mobility:  Bed Mobility:  pt needed verbal cues for hand placement and sequencing for functional mobility.   Rolling Left:  maximal assistance  Supine to Sit: maximal assistance    Transfers:     Sit to Stand:  moderate assistance with hand-held assist from bed and from bedside chair  Bed to Chair: moderate assistance with  hand-held assist  using  Stand Pivot    Gait: pt received gait training 2 side steps from bed to bedside chair with moderate assistance.     Balance: pt sat on EOB with min assist. Pt leaned to R side with sitting.     Pt white board updated with current therapists name and level of mobility assistance needed.       AM-PAC 6 CLICK MOBILITY  Turning over in bed (including adjusting bedclothes, sheets and blankets)?: 2  Sitting down on and standing up from a chair with arms (e.g., wheelchair, bedside commode, etc.): 2  Moving from lying on back to sitting on the side of the bed?: 2  Moving to and from a bed to a chair (including a wheelchair)?: 2  Need to walk in hospital room?: 1  Climbing 3-5 steps with a railing?: 1  Basic Mobility Total Score: 10       Treatment & Education:  Pt received verbal instructions in PT POC and verbally expressed understanding of such.     Patient left up in chair with all lines intact, call button in reach, and RN  present..    GOALS:   Multidisciplinary Problems       Physical Therapy Goals          Problem: Physical Therapy    Goal Priority Disciplines Outcome Goal Variances Interventions   Physical Therapy Goal     PT, PT/OT Ongoing, Progressing     Description: PT goals until 2/9/24    1. Pt supine to sit with supervision-not met  2. Pt sit to supine with supervision-not met  3. Pt sit to stand with AD if needed with supervision-not met  4. Pt to perform gait 150 ft with AD if needed with supervision.-not met  5. Pt to go up/down curb step with AD if  needed with CGA.-not met                         Time Tracking:     PT Received On: 01/17/24  PT Start Time: 1033     PT Stop Time: 1048  PT Total Time (min): 15 min     Billable Minutes: Therapeutic Activity 15 min     Treatment Type: Treatment  PT/PTA: PT     Number of PTA visits since last PT visit: 0     01/17/2024   no

## 2024-01-17 NOTE — PLAN OF CARE
SICU Plan of Care Note    The patient returns from IR after right chest tube placement. The patient is hemodynamically stable and not requiring any pressor support.  She is on 5 L of oxygen at this time, satting high 90s.  The chest tube has put out 1200 of thin serosanguineous fluid, on continuous wall suction.     Plan:  Electrophysiology has agreed to revisit the patient and reset her pacer to a backup pace of 40.  We will plan to wean oxygen, restart her diet, and follow-up pleural fluid cultures.  Chest x-ray is ordered.  The team has reached out to IR, to see if they are planning on draining her left pleural effusion tomorrow.  In that case, she will be made NPO at midnight for another procedure.      CORNEL Sapp MD  General Surgery, PGY-2  [phone]

## 2024-01-18 ENCOUNTER — ANESTHESIA EVENT (OUTPATIENT)
Dept: INTERVENTIONAL RADIOLOGY/VASCULAR | Facility: HOSPITAL | Age: 74
DRG: 163 | End: 2024-01-18
Payer: MEDICARE

## 2024-01-18 LAB
ACID FAST MOD KINY STN SPEC: NORMAL
ALBUMIN SERPL BCP-MCNC: 2 G/DL (ref 3.5–5.2)
ALBUMIN SERPL BCP-MCNC: 2.1 G/DL (ref 3.5–5.2)
ALP SERPL-CCNC: 136 U/L (ref 55–135)
ALT SERPL W/O P-5'-P-CCNC: 8 U/L (ref 10–44)
ANION GAP SERPL CALC-SCNC: 5 MMOL/L (ref 8–16)
ANION GAP SERPL CALC-SCNC: 5 MMOL/L (ref 8–16)
ANION GAP SERPL CALC-SCNC: 7 MMOL/L (ref 8–16)
ANION GAP SERPL CALC-SCNC: 8 MMOL/L (ref 8–16)
APPEARANCE FLD: NORMAL
APTT PPP: 22.2 SEC (ref 21–32)
AST SERPL-CCNC: 17 U/L (ref 10–40)
BASOPHILS # BLD AUTO: 0.04 K/UL (ref 0–0.2)
BASOPHILS NFR BLD: 0.2 % (ref 0–1.9)
BILIRUB SERPL-MCNC: 0.8 MG/DL (ref 0.1–1)
BODY FLD TYPE: NORMAL
BUN SERPL-MCNC: 10 MG/DL (ref 8–23)
BUN SERPL-MCNC: 10 MG/DL (ref 8–23)
BUN SERPL-MCNC: 9 MG/DL (ref 8–23)
BUN SERPL-MCNC: 9 MG/DL (ref 8–23)
CALCIUM SERPL-MCNC: 8 MG/DL (ref 8.7–10.5)
CALCIUM SERPL-MCNC: 8.3 MG/DL (ref 8.7–10.5)
CHLORIDE SERPL-SCNC: 104 MMOL/L (ref 95–110)
CHLORIDE SERPL-SCNC: 108 MMOL/L (ref 95–110)
CHLORIDE SERPL-SCNC: 108 MMOL/L (ref 95–110)
CHLORIDE SERPL-SCNC: 109 MMOL/L (ref 95–110)
CO2 SERPL-SCNC: 21 MMOL/L (ref 23–29)
CO2 SERPL-SCNC: 23 MMOL/L (ref 23–29)
CO2 SERPL-SCNC: 23 MMOL/L (ref 23–29)
CO2 SERPL-SCNC: 24 MMOL/L (ref 23–29)
COLOR FLD: NORMAL
CREAT SERPL-MCNC: 1 MG/DL (ref 0.5–1.4)
DIFFERENTIAL METHOD BLD: ABNORMAL
EOSINOPHIL # BLD AUTO: 0 K/UL (ref 0–0.5)
EOSINOPHIL NFR BLD: 0 % (ref 0–8)
ERYTHROCYTE [DISTWIDTH] IN BLOOD BY AUTOMATED COUNT: 20.7 % (ref 11.5–14.5)
EST. GFR  (NO RACE VARIABLE): 59.5 ML/MIN/1.73 M^2
GLUCOSE SERPL-MCNC: 80 MG/DL (ref 70–110)
GLUCOSE SERPL-MCNC: 99 MG/DL (ref 70–110)
GRAM STN SPEC: NORMAL
GRAM STN SPEC: NORMAL
HCT VFR BLD AUTO: 22.5 % (ref 37–48.5)
HGB BLD-MCNC: 7.2 G/DL (ref 12–16)
IMM GRANULOCYTES # BLD AUTO: 1.04 K/UL (ref 0–0.04)
IMM GRANULOCYTES NFR BLD AUTO: 4.8 % (ref 0–0.5)
KOH PREP SPEC: NORMAL
LYMPHOCYTES # BLD AUTO: 1.2 K/UL (ref 1–4.8)
LYMPHOCYTES NFR BLD: 5.4 % (ref 18–48)
LYMPHOCYTES NFR FLD MANUAL: 82 %
MAGNESIUM SERPL-MCNC: 1.8 MG/DL (ref 1.6–2.6)
MAGNESIUM SERPL-MCNC: 1.8 MG/DL (ref 1.6–2.6)
MCH RBC QN AUTO: 29.4 PG (ref 27–31)
MCHC RBC AUTO-ENTMCNC: 32 G/DL (ref 32–36)
MCV RBC AUTO: 92 FL (ref 82–98)
MONOCYTES # BLD AUTO: 1 K/UL (ref 0.3–1)
MONOCYTES NFR BLD: 4.7 % (ref 4–15)
MONOS+MACROS NFR FLD MANUAL: 3 %
NEUTROPHILS # BLD AUTO: 18.5 K/UL (ref 1.8–7.7)
NEUTROPHILS NFR BLD: 84.9 % (ref 38–73)
NEUTROPHILS NFR FLD MANUAL: 15 %
NRBC BLD-RTO: 0 /100 WBC
PHOSPHATE SERPL-MCNC: 2.4 MG/DL (ref 2.7–4.5)
PLATELET # BLD AUTO: 169 K/UL (ref 150–450)
PMV BLD AUTO: 10.6 FL (ref 9.2–12.9)
POCT GLUCOSE: 103 MG/DL (ref 70–110)
POCT GLUCOSE: 103 MG/DL (ref 70–110)
POCT GLUCOSE: 121 MG/DL (ref 70–110)
POCT GLUCOSE: 157 MG/DL (ref 70–110)
POCT GLUCOSE: 80 MG/DL (ref 70–110)
POCT GLUCOSE: 88 MG/DL (ref 70–110)
POTASSIUM SERPL-SCNC: 4.6 MMOL/L (ref 3.5–5.1)
POTASSIUM SERPL-SCNC: 4.9 MMOL/L (ref 3.5–5.1)
POTASSIUM SERPL-SCNC: 4.9 MMOL/L (ref 3.5–5.1)
POTASSIUM SERPL-SCNC: 5 MMOL/L (ref 3.5–5.1)
PROT SERPL-MCNC: 4.8 G/DL (ref 6–8.4)
RBC # BLD AUTO: 2.45 M/UL (ref 4–5.4)
SODIUM SERPL-SCNC: 135 MMOL/L (ref 136–145)
SODIUM SERPL-SCNC: 136 MMOL/L (ref 136–145)
SODIUM SERPL-SCNC: 136 MMOL/L (ref 136–145)
SODIUM SERPL-SCNC: 138 MMOL/L (ref 136–145)
WBC # BLD AUTO: 21.82 K/UL (ref 3.9–12.7)
WBC # FLD: 93 /CU MM

## 2024-01-18 PROCEDURE — 87206 SMEAR FLUORESCENT/ACID STAI: CPT | Mod: 91

## 2024-01-18 PROCEDURE — 27000221 HC OXYGEN, UP TO 24 HOURS

## 2024-01-18 PROCEDURE — 80053 COMPREHEN METABOLIC PANEL: CPT | Performed by: SURGERY

## 2024-01-18 PROCEDURE — 99232 SBSQ HOSP IP/OBS MODERATE 35: CPT | Mod: ,,,

## 2024-01-18 PROCEDURE — 99223 1ST HOSP IP/OBS HIGH 75: CPT | Mod: 24,FS,GC, | Performed by: STUDENT IN AN ORGANIZED HEALTH CARE EDUCATION/TRAINING PROGRAM

## 2024-01-18 PROCEDURE — 88112 CYTOPATH CELL ENHANCE TECH: CPT | Performed by: PATHOLOGY

## 2024-01-18 PROCEDURE — 25000003 PHARM REV CODE 250: Performed by: NURSE ANESTHETIST, CERTIFIED REGISTERED

## 2024-01-18 PROCEDURE — 88305 TISSUE EXAM BY PATHOLOGIST: CPT | Performed by: PATHOLOGY

## 2024-01-18 PROCEDURE — 11000001 HC ACUTE MED/SURG PRIVATE ROOM

## 2024-01-18 PROCEDURE — 87210 SMEAR WET MOUNT SALINE/INK: CPT

## 2024-01-18 PROCEDURE — 87206 SMEAR FLUORESCENT/ACID STAI: CPT

## 2024-01-18 PROCEDURE — 94640 AIRWAY INHALATION TREATMENT: CPT

## 2024-01-18 PROCEDURE — 94668 MNPJ CHEST WALL SBSQ: CPT

## 2024-01-18 PROCEDURE — 99900035 HC TECH TIME PER 15 MIN (STAT)

## 2024-01-18 PROCEDURE — 94761 N-INVAS EAR/PLS OXIMETRY MLT: CPT

## 2024-01-18 PROCEDURE — 99232 SBSQ HOSP IP/OBS MODERATE 35: CPT | Mod: FS,,, | Performed by: INTERNAL MEDICINE

## 2024-01-18 PROCEDURE — 88305 TISSUE EXAM BY PATHOLOGIST: CPT | Mod: 26,,, | Performed by: PATHOLOGY

## 2024-01-18 PROCEDURE — 85025 COMPLETE CBC W/AUTO DIFF WBC: CPT | Performed by: SURGERY

## 2024-01-18 PROCEDURE — 89051 BODY FLUID CELL COUNT: CPT

## 2024-01-18 PROCEDURE — 99223 1ST HOSP IP/OBS HIGH 75: CPT | Mod: 25,,, | Performed by: PHYSICIAN ASSISTANT

## 2024-01-18 PROCEDURE — 25000242 PHARM REV CODE 250 ALT 637 W/ HCPCS

## 2024-01-18 PROCEDURE — 63600175 PHARM REV CODE 636 W HCPCS: Performed by: NURSE ANESTHETIST, CERTIFIED REGISTERED

## 2024-01-18 PROCEDURE — 63600175 PHARM REV CODE 636 W HCPCS: Performed by: NURSE PRACTITIONER

## 2024-01-18 PROCEDURE — 87102 FUNGUS ISOLATION CULTURE: CPT

## 2024-01-18 PROCEDURE — 94799 UNLISTED PULMONARY SVC/PX: CPT | Mod: XB

## 2024-01-18 PROCEDURE — 99233 SBSQ HOSP IP/OBS HIGH 50: CPT | Mod: FS,,, | Performed by: INTERNAL MEDICINE

## 2024-01-18 PROCEDURE — 0W9B30Z DRAINAGE OF LEFT PLEURAL CAVITY WITH DRAINAGE DEVICE, PERCUTANEOUS APPROACH: ICD-10-PCS | Performed by: RADIOLOGY

## 2024-01-18 PROCEDURE — 84100 ASSAY OF PHOSPHORUS: CPT | Performed by: SURGERY

## 2024-01-18 PROCEDURE — 80069 RENAL FUNCTION PANEL: CPT | Performed by: INTERNAL MEDICINE

## 2024-01-18 PROCEDURE — 25000003 PHARM REV CODE 250

## 2024-01-18 PROCEDURE — 63600175 PHARM REV CODE 636 W HCPCS: Performed by: STUDENT IN AN ORGANIZED HEALTH CARE EDUCATION/TRAINING PROGRAM

## 2024-01-18 PROCEDURE — 63600175 PHARM REV CODE 636 W HCPCS

## 2024-01-18 PROCEDURE — 85730 THROMBOPLASTIN TIME PARTIAL: CPT

## 2024-01-18 PROCEDURE — 97535 SELF CARE MNGMENT TRAINING: CPT

## 2024-01-18 PROCEDURE — 97530 THERAPEUTIC ACTIVITIES: CPT

## 2024-01-18 PROCEDURE — 99000 SPECIMEN HANDLING OFFICE-LAB: CPT

## 2024-01-18 PROCEDURE — 88112 CYTOPATH CELL ENHANCE TECH: CPT | Mod: 26,,, | Performed by: PATHOLOGY

## 2024-01-18 PROCEDURE — 87205 SMEAR GRAM STAIN: CPT

## 2024-01-18 PROCEDURE — 87116 MYCOBACTERIA CULTURE: CPT

## 2024-01-18 PROCEDURE — D9220A PRA ANESTHESIA: Mod: ANES,,, | Performed by: ANESTHESIOLOGY

## 2024-01-18 PROCEDURE — D9220A PRA ANESTHESIA: Mod: CRNA,,, | Performed by: NURSE ANESTHETIST, CERTIFIED REGISTERED

## 2024-01-18 PROCEDURE — 90945 DIALYSIS ONE EVALUATION: CPT

## 2024-01-18 PROCEDURE — 83735 ASSAY OF MAGNESIUM: CPT | Mod: 91 | Performed by: INTERNAL MEDICINE

## 2024-01-18 PROCEDURE — 25000003 PHARM REV CODE 250: Performed by: SURGERY

## 2024-01-18 PROCEDURE — 87070 CULTURE OTHR SPECIMN AEROBIC: CPT

## 2024-01-18 PROCEDURE — 25000003 PHARM REV CODE 250: Performed by: STUDENT IN AN ORGANIZED HEALTH CARE EDUCATION/TRAINING PROGRAM

## 2024-01-18 PROCEDURE — 99499 UNLISTED E&M SERVICE: CPT | Mod: GC,,, | Performed by: ANESTHESIOLOGY

## 2024-01-18 RX ORDER — PROPOFOL 10 MG/ML
VIAL (ML) INTRAVENOUS
Status: DISCONTINUED | OUTPATIENT
Start: 2024-01-18 | End: 2024-01-18

## 2024-01-18 RX ORDER — LIDOCAINE HYDROCHLORIDE 20 MG/ML
INJECTION INTRAVENOUS
Status: DISCONTINUED | OUTPATIENT
Start: 2024-01-18 | End: 2024-01-18

## 2024-01-18 RX ORDER — FENTANYL CITRATE 50 UG/ML
INJECTION, SOLUTION INTRAMUSCULAR; INTRAVENOUS
Status: DISCONTINUED | OUTPATIENT
Start: 2024-01-18 | End: 2024-01-18

## 2024-01-18 RX ORDER — MIDAZOLAM HYDROCHLORIDE 1 MG/ML
INJECTION, SOLUTION INTRAMUSCULAR; INTRAVENOUS
Status: DISCONTINUED | OUTPATIENT
Start: 2024-01-18 | End: 2024-01-18

## 2024-01-18 RX ADMIN — SODIUM CHLORIDE: 0.9 INJECTION, SOLUTION INTRAVENOUS at 03:01

## 2024-01-18 RX ADMIN — PROPOFOL 30 MG: 10 INJECTION, EMULSION INTRAVENOUS at 03:01

## 2024-01-18 RX ADMIN — MEROPENEM 500 MG: 500 INJECTION INTRAVENOUS at 12:01

## 2024-01-18 RX ADMIN — MEROPENEM 500 MG: 500 INJECTION INTRAVENOUS at 11:01

## 2024-01-18 RX ADMIN — PROPOFOL 50 MCG/KG/MIN: 10 INJECTION, EMULSION INTRAVENOUS at 03:01

## 2024-01-18 RX ADMIN — FENTANYL CITRATE 25 MCG: 50 INJECTION, SOLUTION INTRAMUSCULAR; INTRAVENOUS at 03:01

## 2024-01-18 RX ADMIN — MAGNESIUM SULFATE HEPTAHYDRATE 2 G: 40 INJECTION, SOLUTION INTRAVENOUS at 06:01

## 2024-01-18 RX ADMIN — ACETYLCYSTEINE 4 ML: 100 INHALANT RESPIRATORY (INHALATION) at 07:01

## 2024-01-18 RX ADMIN — MIDAZOLAM HYDROCHLORIDE 0.5 MG: 1 INJECTION, SOLUTION INTRAMUSCULAR; INTRAVENOUS at 03:01

## 2024-01-18 RX ADMIN — OXYBUTYNIN CHLORIDE 5 MG: 5 TABLET ORAL at 04:01

## 2024-01-18 RX ADMIN — DRONABINOL 5 MG: 2.5 CAPSULE ORAL at 08:01

## 2024-01-18 RX ADMIN — LIDOCAINE HYDROCHLORIDE 70 MG: 20 INJECTION INTRAVENOUS at 03:01

## 2024-01-18 RX ADMIN — OXYBUTYNIN CHLORIDE 5 MG: 5 TABLET ORAL at 08:01

## 2024-01-18 RX ADMIN — PANTOPRAZOLE SODIUM 40 MG: 40 TABLET, DELAYED RELEASE ORAL at 08:01

## 2024-01-18 RX ADMIN — LIDOCAINE 5% 1 PATCH: 700 PATCH TOPICAL at 08:01

## 2024-01-18 NOTE — NURSING
SICU PLAN OF CARE NOTE    Dx: Malignant carcinoid tumor of bronchus    Shift Events: VSS, pt with bradycardic episodes during shift, pacemaker appears to pace consecutively during episodes. IR today for R pleural chest tube placement, plan for NPO @midnight for L pleural chest tube placement. Cultures sent from chest tube fluid. Pt OOBTC, [id number]. Pacemaker backup rate increased to 60bpm for IR procedures, EP plans to decrease to 50bpm after tomorrows procedure.       Neuro: AAO x4, Follows Commands, and Moves All Extremities    Cardiac: NSR 60s-70s    Respiratory: Nasal Cannula 2LPM    GI: Cardiac Diet and renal    : Anuric 0 cc/shift    Drains: Chest Tube, total output 1250 cc /  shift     Labs/Accuchecks: RFP q6, accuchecks Q4    Skin: No new skin breakdown noted during shift, pt turned and repositioned throughout shift. Bed plugged in,wheels locked,call light in reach. SCDS and foams in place. Pt updated on plan of care and verbalize understanding.

## 2024-01-18 NOTE — ANESTHESIA PREPROCEDURE EVALUATION
01/18/2024  Lily Green is a 73 y.o., female.  Pre-operative evaluation for * No procedures listed *    Lily Green is a 73 y.o. female       Patient Active Problem List   Diagnosis    Type 2 diabetes mellitus with diabetic polyneuropathy, without long-term current use of insulin    Hyperlipidemia, mixed    GERD without esophagitis    Mild recurrent major depression    Primary insomnia    Chronic neck pain    DDD (degenerative disc disease), cervical    Screening mammogram, encounter for    Celiac disease    Hand arthritis    Benign essential tremor    Chronic pain of both knees    Irritable bowel syndrome    TANIA (stress urinary incontinence, female)    Meniere's disease of both ears    Ankle weakness    Psoriasis    Idiopathic neuropathy    Bilateral foot pain    Generalized anxiety disorder    Cognitive complaints    Peritonitis    Large bowel ischemia    Elevated lactic acid level    Hyponatremia    Primary hypertension    Anemia    Hypophosphatemia    Debility    Weakness    Lung nodule    Malignant carcinoid tumor of bronchus    ESRD (end stage renal disease)    Atrial fibrillation    Anticoagulated on heparin    Anuria    A-V fistula    Bradycardia    Parapneumonic effusion       Past Surgical History:   Procedure Laterality Date    APPENDECTOMY      BREAST SURGERY      Tags    CARPAL TUNNEL RELEASE Bilateral 09/2017    ,     CLOSURE, COLOSTOMY Left 1/16/2023    Procedure: CLOSURE, COLOSTOMY;  Surgeon: Manuel Javier MD;  Location: [facility] OR;  Service: General;  Laterality: Left;    COLONOSCOPY  04/2018    Normal  ([facility]agaald)     COLOSTOMY Right 1/16/2023    Procedure: CREATION, COLOSTOMY;  Surgeon: Manuel Javier MD;  Location: [facility] OR;  Service: General;  Laterality: Right;    DILATION AND CURETTAGE OF UTERUS  1986    DUPUYTREN CONTRACTURE RELEASE Bilateral 09/2017     HYSTERECTOMY  1987    BEAU w/ appy, no BSO     IMPLANTATION OF LEADLESS PACEMAKER N/A 1/16/2024    Procedure: INSERTION, CARDIAC PACEMAKER, LEADLESS;  Surgeon: LENIN Frances MD;  Location: [facility] EP LAB;  Service: Cardiology;  Laterality: N/A;  SB, LEADLESS PPM (MICRA), MDT, ANES, EH, CVICU [id number]    INJECTION OF NEUROLYTIC AGENT AROUND LUMBAR SYMPATHETIC NERVE N/A 1/6/2022    Procedure: BLOCK, LUMBAR SYMPATHETIC;  Surgeon: Souleymane Rizo Jr., MD;  Location: [facility] PAIN MGT;  Service: Pain Management;  Laterality: N/A;  no pacemaker   pt is diabetic     INJECTION OF NEUROLYTIC AGENT AROUND LUMBAR SYMPATHETIC NERVE N/A 8/25/2022    Procedure: BLOCK, LUMBAR SYMPATHETIC;  Surgeon: Souleymane Rizo Jr., MD;  Location: [facility] PAIN MGT;  Service: Pain Management;  Laterality: N/A;  diabetic     INSERTION OF TUNNELED CENTRAL VENOUS HEMODIALYSIS CATHETER Right 1/25/2023    Procedure: INSERTION, CATHETER, HEMODIALYSIS, DUAL LUMEN;  Surgeon: Manuel Javier MD;  Location: [facility] OR;  Service: General;  Laterality: Right;    INSERTION, CATHETER, TUNNELED Left 1/28/2023    Procedure: Insertion,catheter,tunneled;  Surgeon: Watson Fontenot MD;  Location: [facility] OR;  Service: General;  Laterality: Left;    LAPAROTOMY, EXPLORATORY N/A 1/14/2023    Procedure: LAPAROTOMY, EXPLORATORY;  Surgeon: Manuel Javier MD;  Location: [facility];  Service: General;  Laterality: N/A;    LAPAROTOMY, EXPLORATORY N/A 1/16/2023    Procedure: LAPAROTOMY, EXPLORATORY;  Surgeon: Manuel Javier MD;  Location: [facility] OR;  Service: General;  Laterality: N/A;    LYMPHADENECTOMY Right 1/2/2024    Procedure: LYMPHADENECTOMY;  Surgeon: Tan Thomson MD;  Location: [address];  Service: Cardiothoracic;  Laterality: Right;    PACEMAKER, TEMPORARY, TRANSVENOUS, PEDIATRIC Right 1/12/2024    Procedure: Pacemaker, Temporary, Transvenous;  Surgeon: Todd Carlisle MD;  Location: [facility] CATH LAB;  Service: Cardiology;  Laterality: Right;    REMOVAL  OF CATHETER Right 1/28/2023    Procedure: REMOVAL, CATHETER;  Surgeon: Watson Fontenot MD;  Location: [facility] OR;  Service: General;  Laterality: Right;    REMOVAL, TUNNELED CATH Right 1/25/2023    Procedure: REMOVAL, TUNNELED CATH;  Surgeon: Manuel Javier MD;  Location: [facility] OR;  Service: General;  Laterality: Right;    ROBOTIC BRONCHOSCOPY N/A 10/20/2023    Procedure: ROBOTIC BRONCHOSCOPY;  Surgeon: Mayda Monzon MD;  Location: [facility] OR [facility] FLR;  Service: Pulmonary;  Laterality: N/A;    SHOULDER SURGERY  2009 & 2010    right rotator cuff    THORACOTOMY Right 1/2/2024    Procedure: THORACOTOMY;  Surgeon: Tan Thomson MD;  Location: [facility] OR 2ND FLR;  Service: Cardiothoracic;  Laterality: Right;    THORACOTOMY, WITH INITIAL THERAPEUTIC WEDGE RESECTION OF LUNG Right 1/2/2024    Procedure: THORACOTOMY, WITH INITIAL THERAPEUTIC WEDGE RESECTION OF LUNG;  Surgeon: Tan Thomson MD;  Location: [facility] OR 2ND FLR;  Service: Cardiothoracic;  Laterality: Right;    TONSILLECTOMY      UPPER GASTROINTESTINAL ENDOSCOPY  04/2018       Social History     Socioeconomic History    Marital status:    Tobacco Use    Smoking status: Never    Smokeless tobacco: Never   Substance and Sexual Activity    Alcohol use: No    Drug use: Never    Sexual activity: Not Currently     Partners: Male     Birth control/protection: See Surgical Hx     Comment: :      Social Determinants of Health     Financial Resource Strain: Low Risk  (11/22/2023)    Overall Financial Resource Strain (CARDIA)     Difficulty of Paying Living Expenses: Not hard at all   Food Insecurity: No Food Insecurity (11/22/2023)    Hunger Vital Sign     Worried About Running Out of Food in the Last Year: Never true     Ran Out of Food in the Last Year: Never true   Transportation Needs: No Transportation Needs (11/22/2023)    PRAPARE - Transportation     Lack of Transportation (Medical): No     Lack of Transportation (Non-Medical): No   Physical  Activity: Insufficiently Active (11/22/2023)    Exercise Vital Sign     Days of Exercise per Week: 4 days     Minutes of Exercise per Session: 10 min   Stress: Stress Concern Present (11/22/2023)    [employer] [place] [employer] - Occupational Stress Questionnaire     Feeling of Stress : To some extent   Social Connections: Unknown (11/22/2023)    Social Connection and Isolation Panel [NHANES]     Frequency of Communication with Friends and Family: More than three times a week     Frequency of Social Gatherings with Friends and Family: More than three times a week     Active Member of Clubs or Organizations: Yes     Attends Club or Organization Meetings: More than 4 times per year     Marital Status:    Housing Stability: Low Risk  (11/22/2023)    Housing Stability Vital Sign     Unable to Pay for Housing in the Last Year: No     Number of Places Lived in the Last Year: 1     Unstable Housing in the Last Year: No       No current facility-administered medications on file prior to encounter.     Current Outpatient Medications on File Prior to Encounter   Medication Sig Dispense Refill    atorvastatin (LIPITOR) 40 MG tablet Take 1 tablet (40 mg total) by mouth once daily. (Patient taking differently: Take 40 mg by mouth every evening.) 90 tablet 3    epoetin noble-epbx (RETACRIT) 10,000 unit/mL imjection Inject 1 mL (10,000 Units total) into the skin every Mon, Wed, Fri. HOLD IF HEMOGLOBIN is 10 or GREATER    DO NOT SHAKE  DO NOT DILUTE  DO NOT MIX with other drug solutions 12 mL 0    ferrous gluconate (FERGON) 324 MG tablet Take 1 tablet (324 mg total) by mouth daily with breakfast. 30 tablet 3    meclizine (ANTIVERT) 25 mg tablet Take 1 tablet (25 mg total) by mouth 3 (three) times daily as needed for Dizziness. 90 tablet 3    melatonin (MELATIN) 3 mg tablet Take 3 tablets (9 mg total) by mouth nightly as needed for Insomnia. 90 tablet 3    pantoprazole (PROTONIX) 40 MG tablet Take 1 tablet (40 mg  "total) by mouth once daily. 90 tablet 3    patiromer calcium sorbitex (VELTASSA) 8.4 gram PwPk Take 2 packets (16.8 g total) by mouth once daily. 180 packet 2    sevelamer HCL (RENAGEL) 800 MG Tab Take 2 tablets (1,600 mg total) by mouth 3 (three) times daily with meals. 180 tablet 11    B complex-vitamin C-folic acid (MELLISSA-BENJY) 0.8 mg Tab Take 1 tablet (0.8 mg total) by mouth once daily. 30 tablet 3    betahistine HCl (BETAHISTINE, BULK, MISC)       blood sugar diagnostic (TRUE METRIX GLUCOSE TEST STRIP) Strp USE ONE STRIP FOR TESTING 2 TIMES A  each 11    blood-glucose meter kit Use to test twice a day 1 each 0    calcium-vitamin D3 (OS-CIPRIANO 500 + D3) 500 mg-5 mcg (200 unit) per tablet Take 1 tablet by mouth once daily. 30 tablet 3    coenzyme Q10 100 mg capsule       dicyclomine (BENTYL) 10 MG capsule       dronabinoL (MARINOL) 5 MG capsule Take 1 capsule (5 mg total) by mouth 2 (two) times daily before meals. 60 capsule 1    econazole nitrate 1 % cream Apply topically 2 (two) times daily. 30 g 2    insulin aspart U-100 (NOVOLOG) 100 unit/mL (3 mL) InPn pen Inject before meals:  150-200=+1, 201-250=+2, 251-300=+3; 301-350=+4, over 350=+5 units 15 mL 0    lancets Misc 1 lancet by Misc.(Non-Drug; Combo Route) route 4 (four) times daily. 400 each 3    mometasone (ELOCON) 0.1 % ointment Apply topically.      neomycin (MYCIFRADIN) 500 mg Tab       nutritional supplements Liqd Take 237 mLs by mouth 4 (four) times daily. 120 each 6    oxyCODONE (ROXICODONE) 5 MG immediate release tablet Take 5 mg by mouth every 4 (four) hours as needed.      pen needle, diabetic (BD ULTRA-FINE MARIS PEN NEEDLE) 32 gauge x 5/32" Ndle 1 Device by Misc.(Non-Drug; Combo Route) route 4 (four) times daily with meals and nightly. 400 each 3    pregabalin (LYRICA) 150 MG capsule TAKE ONE CAPSULE BY MOUTH 2 TIMES A DAY 60 capsule 2    pregabalin (LYRICA) 75 MG capsule Take 1 capsule (75 mg total) by mouth Daily. Give after HD 30 capsule 0 "    simethicone (MYLICON) 125 mg Cap capsule Take by mouth.      vit C,F-Jl-hgkbl-lutein-zeaxan (PRESERVISION AREDS 2) 573-195-37-1 mg-unit-mg-mg Cap       vitamins  A,C,E-zinc-copper [phone] unit-mg-unit Cap Take 1 tablet by mouth once daily.         Review of patient's allergies indicates:   Allergen Reactions    Crestor [rosuvastatin] Swelling    Gluten protein          CBC:   Recent Labs     01/17/24 0316 01/18/24 0348   WBC 23.53* 21.82*   RBC 2.63* 2.45*   HGB 7.6* 7.2*   HCT 23.2* 22.5*    169   MCV 88 92   MCH 28.9 29.4   MCHC 32.8 32.0       CMP:   Recent Labs     01/17/24 0316 01/17/24  1314 01/17/24 2218 01/18/24 0348   *   < > 135*  135* 136  136  138   K 4.8   < > 4.4  4.4 4.9  4.9  5.0      < > 106  106 108  108  109   CO2 22*   < > 23  23 23  23  21*   BUN 25*   < > 19  19 9  9  10   CREATININE 1.9*   < > 1.4  1.4 1.0  1.0  1.0   *   < > 107  107 99  99  99   MG 2.4  --  2.1  2.1 1.8  1.8   PHOS 5.6*   < > 3.2  3.2 2.4*  2.4*  2.4*   CALCIUM 8.4*   < > 8.0*  8.0* 8.0*  8.0*  8.0*   ALBUMIN 2.4*   < > 2.0*  2.0* 2.0*  2.0*  2.0*   PROT 5.8*  --   --  4.8*   ALKPHOS 158*  --   --  136*   ALT 7*  --   --  8*   AST 20  --   --  17   BILITOT 0.7  --   --  0.8    < > = values in this interval not displayed.       INR  Recent Labs     01/16/24  0255 01/17/24 0316 01/18/24 0348   APTT 22.8 23.9 22.2         Diagnostic Studies:    EKG:   Results for orders placed or performed during the hospital encounter of 01/02/24   EKG 12-lead    Collection Time: 01/16/24  4:21 AM    Narrative    Test Reason : Z01.818,    Vent. Rate : 076 BPM     Atrial Rate : 076 BPM     P-R Int : 132 ms          QRS Dur : 086 ms      QT Int : 348 ms       P-R-T Axes : 045 026 061 degrees     QTc Int : 391 ms    Normal sinus rhythm  Low voltage QRS  Nonspecific T wave abnormality  Abnormal ECG  When compared with ECG of 13-JAN-2024 07:44,  QT has shortened  Confirmed  by Sai SAGASTUME MD ([id number]) on 1/16/2024 9:03:19 AM    Referred By: CR CARLOS           Confirmed By:Sai SAGASTUME MD       TTE:  Results for orders placed or performed during the hospital encounter of 01/02/24   Echo   Result Value Ref Range    RA Width 2.85 cm    LA volume (mod) 52.06 cm3    Left Atrium Major Axis 4.58 cm    Left Atrium Minor Axis 4.81 cm    RA Major Axis 4.76 cm    LV Diastolic Volume 49.72 mL    LV Systolic Volume 18.49 mL    TR Max King 2.53 m/s    MV Peak E King 0.92 m/s    Mr max king 0.05 m/s    Ao VTI 20.69 cm    Ao peak king 1.17 m/s    LVOT peak VTI 17.46 cm    LVOT peak king 0.97 m/s    LVOT diameter 2.02 cm    AV mean gradient 3 mmHg    TAPSE 1.12 cm    RVDD 2.34 cm    LA size 3.24 cm    Ascending aorta 3.9 cm    STJ 2.95 cm    Sinus 3.28 cm    LVIDs 2.32 2.1 - 4.0 cm    Posterior Wall 1.02 0.6 - 1.1 cm    IVS 0.86 0.6 - 1.1 cm    LVIDd 3.47 (A) 3.5 - 6.0 cm    LA WIDTH 4.14 cm    FS 33 28 - 44 %    LA volume 53.50 cm3    LV mass 93.24 g    ZLVIDD -3.38     ZLVIDS -2.06     Left Ventricle Relative Wall Thickness 0.59 cm    AV valve area 2.70 cm²    AV Velocity Ratio 0.83     AV index (prosthetic) 0.84     LVOT area 3.2 cm2    LVOT stroke volume 55.93 cm3    AV peak gradient 5 mmHg    LV Systolic Volume Index 10.5 mL/m2    LV Diastolic Volume Index 28.25 mL/m2    LA Volume Index 30.4 mL/m2    LV Mass Index 53 g/m2    Triscuspid Valve Regurgitation Peak Gradient 26 mmHg    LA Volume Index (Mod) 29.6 mL/m2    IVANNA by Velocity Ratio 2.66 cm²    BSA 1.78 m2    TV resting pulmonary artery pressure 34 mmHg    RV TB RVSP 11 mmHg    Est. RA pres 8 mmHg    Narrative      Left Ventricle: The left ventricle is normal in size. Normal wall   thickness. Normal wall motion. There is normal systolic function with a   visually estimated ejection fraction of 60 - 65%. There is normal   diastolic function.    Right Ventricle: Normal right ventricular cavity size. Wall thickness   is normal. Right ventricle wall  motion  is normal. Systolic function is   normal.    Aortic Valve: The aortic valve is a trileaflet valve. There is moderate   aortic valve sclerosis. There is mild annular calcification present.   Mildly restricted motion. There is mild aortic regurgitation.    Mitral Valve: There is mild to moderate regurgitation.    Tricuspid Valve: The tricuspid valve is structurally normal. There is   trace regurgitation.  No evidence of carcinoid disease of the tricuspid   valve.    Pulmonary Artery: There is borderline elevated pulmonary hypertension.   The estimated pulmonary artery systolic pressure is 34 mmHg.    IVC/SVC: Intermediate venous pressure at 8 mmHg.    Large left pleural effusion.    Pericardium: There is a moderate circumferential effusion, largest   anterior to the tricuspid valve annulus. Pericardial effusion has focal   strands. No indication of cardiac tamponade. Evidence includes no IVC   dilation, no chamber collapse.       EF   Date Value Ref Range Status   01/14/2023 35 % Final      No results found for this or any previous visit.    JARVIS:  No results found for this or any previous visit.    Stress Test:  Results for orders placed during the hospital encounter of 11/22/23    Nuclear Stress - Cardiology Interpreted    Interpretation Summary    Normal myocardial perfusion scan. There is no evidence of myocardial ischemia or infarction.    The LVEF is not accurate due to poor software boundary tracking at rest  and poor software boundary tracking during stress. The visually estimated ejection fraction is normal at rest and normal during stress.    There is mild global hypokinesis at rest and stress.    LV cavity size is normal at rest and normal at stress.    The ECG portion of the study is negative for ischemia.    The patient reported no chest pain during the stress test.    There were no arrhythmias during stress.    There are no prior studies for comparison.       LHC:  No results found for this or any  "previous visit.       PFT:  No results found for: "FEV1", "FVC", "IUE4PHN", "TLC", "DLCO"     ALLERGIES:     Review of patient's allergies indicates:   Allergen Reactions    Crestor [rosuvastatin] Swelling    Gluten protein      LDA:          Lines/Drains/Airways       Central Venous Catheter Line  Duration                  Hemodialysis Catheter 01/25/23 1501 right internal jugular 357 days              Drain  Duration                  Chest Tube 01/17/24 1504 Right Pleural 10 Fr. <1 day              Peripheral Intravenous Line  Duration                  Hemodialysis AV Fistula 09/01/23 0800 Left forearm 139 days         Peripheral IV - Single Lumen 01/15/24 1230 20 G;1 3/4 in Anterior;Proximal;Right Forearm 3 days         Peripheral IV - Single Lumen 01/16/24 1300 20 G Anterior;Proximal;Right Forearm 2 days                   Anesthesia Evaluation      Airway   Mallampati: II  TM distance: > 6 cm  Neck ROM: Normal ROM  Dental    (+) Intact    Pulmonary    Cardiovascular   (+) hypertension    Rate: Normal    Neuro/Psych      GI/Hepatic/Renal    (+) GERD, chronic renal disease    Endo/Other    (+) diabetes mellitus  Abdominal                           Pre-op Assessment    I have reviewed the Patient Summary Reports.     I have reviewed the Nursing Notes. I have reviewed the NPO Status.   I have reviewed the Medications.     Review of Systems  Anesthesia Hx:  No problems with previous Anesthesia             Denies Family Hx of Anesthesia complications.    Denies Personal Hx of Anesthesia complications.                    Cardiovascular:     Hypertension                                        Pulmonary:         Parapneumonic effusion               Renal/:  Chronic Renal Disease                Hepatic/GI:     GERD             Neurological:  Neurology Normal                                      Endocrine:  Diabetes               Physical Exam  General: Well nourished    Airway:  Mallampati: II   Mouth Opening: " Normal  TM Distance: > 6 cm  Tongue: Normal  Neck ROM: Normal ROM    Dental:  Intact    Chest/Lungs:  Normal Respiratory Rate    Heart:  Rate: Normal        Anesthesia Plan  Type of Anesthesia, risks & benefits discussed:    Anesthesia Type: Gen Natural Airway, MAC  Intra-op Monitoring Plan: Standard ASA Monitors  Post Op Pain Control Plan: multimodal analgesia and IV/PO Opioids PRN  Induction:  IV  Airway Plan: Direct, Post-Induction  Informed Consent: Informed consent signed with the Patient and all parties understand the risks and agree with anesthesia plan.  All questions answered. Patient consented to blood products? Yes  ASA Score: 3  Day of Surgery Review of History & Physical: H&P Update referred to the surgeon/provider.    Ready For Surgery From Anesthesia Perspective.     .

## 2024-01-18 NOTE — PROGRESS NOTES
"[facility]  Infectious Disease  Progress Note    Patient Name: Lily Green  MRN: [id number]  Admission Date: 1/2/2024  Length of Stay: 16 days  Attending Physician: Tan Thomson MD  Primary Care Provider: Pavel Calixto MD    Isolation Status: No active isolations  Assessment/Plan:      Pulmonary  Parapneumonic effusion  I have reviewed hospital notes from  SIC service and other specialty providers. I have also reviewed CBC, CMP/BMP,  cultures and imaging with my interpretation as documented.     Admitted for carcinoid tumor lymphadenectomy,  robotic-assisted surgery converted to right thoracotomy with therapeutic wedge resection and open mediastinal lymph node dissection on 1/2. Hospital course complicated by Afib with RVR, ileus managed with NGT which has since been removed, and tachy-eulogio syndrome requiring temporary pacemaker. Now with multi loculated pleural effusion presumptively parapneumonic in etiology. However, diagnostic work up to assess whether effusion is infectious or non infectious not yet performed.     Re-consulted on 1/16 for "OK" to proceed with pacemaker placement. S/P PPM on 1/16. Afebrile and with down trending leukocytosis. Gram stain from chest tube on the right without organisms. Pending chest tube/thoracentesis on the left on 1/18.    Can continue meropenem for now.  Please send pleural effusion aspirate for cell count, gram stain, culture, AFB stain and culture, as well as pathology.  Discussed with patient and family at bedside.   Discussed management plan with the staff and/or members from SICU service.        Anticipated Disposition: per primary    Thank you for your consult. I will follow-up with patient. Please contact us if you have any additional questions.    Rosio Rivera MD  Infectious Disease  [facility]    50 minutes of total time spent on the encounter, which includes face to face time and non-face to " "face time preparing to see the patient (eg, review of tests), obtaining and/or reviewing separately obtained history, documenting clinical information in the electronic or other health record, independently interpreting results (not separately reported) and communicating results to the patient/family/caregiver, or care coordination (not separately reported).     Subjective:     Principal Problem:Malignant carcinoid tumor of bronchus    HPI: 73 y.o. female never smoker with DDD, benign essential tremor, meniere's disease, HTN, HLD, DM2, GERD, Celiac's, IBS who is found to have RML Carcinoid tumor presented on 1/2 for lymphadenectomy,  robotic-assisted surgery converted to right thoracotomy with therapeutic wedge resection and open mediastinal lymph node dissection. Stepped up to the unit after converting into afib RVR after dialysis 1/5. She was given 3 doses of metoprolol 5, without any response and her heart rate or rhythm.  The oxygen requirements increased to 5 L from room air.      CT chest on 1/13 revealed mediastinal LAD, Moderate-sized bilateral pleural effusions, increased from 01/08/2024; likely multiply-loculated on the right. Underlying pleural infection or pleural neoplasm not excluded.     ID consulted for: "needs meropenem for right sided loculated pleural effusion, possible IR drainage later this week."    Patient seen and evaluated by Infectious Diseases with recommendations for meropenem while awaiting further work up of the right pleural effusion.    Infectious Diseases re-consulted on 1/16 for "EP needs ok for pacemaker previous to pleural effusion drainage"      Interval History: "So so". Right sided pleural effusion of unknown etiology. On broad spectrum antibiotics. Underwent PPM o 1/16. Bradycardia episodes overnight. RIght sided chest tube placement on 1/17. Pending chest tube/thoracentesis on the left today..    Review of Systems   Constitutional:  Positive for fatigue. Negative for chills, " fever and unexpected weight change.   HENT:  Negative for ear pain, facial swelling, hearing loss, mouth sores, nosebleeds, rhinorrhea, sinus pressure, sore throat, tinnitus, trouble swallowing and voice change.    Eyes:  Negative for photophobia, pain, redness and visual disturbance.   Respiratory:  Positive for cough and shortness of breath. Negative for chest tightness and wheezing.    Cardiovascular:  Negative for chest pain, palpitations and leg swelling.   Gastrointestinal:  Negative for abdominal pain, blood in stool, constipation, diarrhea, nausea and vomiting.   Endocrine: Negative for cold intolerance, heat intolerance, polydipsia, polyphagia and polyuria.   Genitourinary:  Negative for decreased urine volume, dysuria, flank pain, frequency, hematuria, menstrual problem, urgency, vaginal bleeding, vaginal discharge and vaginal pain.   Musculoskeletal:  Negative for arthralgias, back pain, joint swelling, myalgias and neck pain.   Skin:  Negative for rash.   Allergic/Immunologic: Negative for environmental allergies, food allergies and immunocompromised state.   Neurological:  Negative for dizziness, seizures, syncope, weakness, light-headedness, numbness and headaches.   Hematological:  Negative for adenopathy. Does not bruise/bleed easily.   Psychiatric/Behavioral:  Negative for confusion, hallucinations, self-injury, sleep disturbance and suicidal ideas. The patient is not nervous/anxious.      Objective:     Vital Signs (Most Recent):  Temp: 97.3 °F (36.3 °C) (01/18/24 1900)  Pulse: 89 (01/18/24 1900)  Resp: (!) 33 (01/18/24 1900)  BP: (!) 92/51 (01/18/24 1900)  SpO2: 100 % (01/18/24 1900) Vital Signs (24h Range):  Temp:  [97.3 °F (36.3 °C)-98 °F (36.7 °C)] 97.3 °F (36.3 °C)  Pulse:  [69-89] 89  Resp:  [5-43] 33  SpO2:  [88 %-100 %] 100 %  BP: ()/(51-81) 92/51     Weight: 77 kg (169 lb 12.1 oz)  Body mass index is 28.25 kg/m².    Estimated Creatinine Clearance: 51.4 mL/min (based on SCr of 1  mg/dL).     Physical Exam  Vitals and nursing note reviewed.   Constitutional:       Appearance: She is well-developed. She is ill-appearing.   HENT:      Head: Normocephalic and atraumatic.      Right Ear: External ear normal.      Left Ear: External ear normal.   Eyes:      General: No scleral icterus.        Right eye: No discharge.         Left eye: No discharge.      Conjunctiva/sclera: Conjunctivae normal.   Cardiovascular:      Rate and Rhythm: Normal rate and regular rhythm.      Heart sounds: Normal heart sounds. No murmur heard.     No friction rub. No gallop.   Pulmonary:      Effort: Pulmonary effort is normal. No respiratory distress.      Breath sounds: No stridor. Examination of the right-lower field reveals decreased breath sounds. Decreased breath sounds present. No wheezing or rales.   Abdominal:      General: Bowel sounds are normal.   Musculoskeletal:         General: No tenderness.      Right lower leg: Edema present.      Left lower leg: Edema present.   Skin:     General: Skin is warm and dry.   Neurological:      Mental Status: She is alert and oriented to person, place, and time.          Significant Labs: BMP:   Recent Labs   Lab 01/18/24  0348 01/18/24  1618   GLU 99  99  99 80     136  138 135*   K 4.9  4.9  5.0 4.6     108  109 104   CO2 23  23  21* 24   BUN 9  9  10 10   CREATININE 1.0  1.0  1.0 1.0   CALCIUM 8.0*  8.0*  8.0* 8.3*   MG 1.8  1.8  --        CBC:   Recent Labs   Lab 01/17/24  0316 01/18/24  0348   WBC 23.53* 21.82*   HGB 7.6* 7.2*   HCT 23.2* 22.5*    169       Microbiology Results (last 7 days)       Procedure Component Value Units Date/Time    JADEN prep [[id number]] Collected: 01/18/24 1538    Order Status: Completed Specimen: Body Fluid from Pleural Fluid Updated: 01/18/24 1932     KOH Prep No yeast or fungal elements seen    Narrative:      IR procedure 1/18 L lung    AFB culture [[id number]] Collected: 01/18/24 1538    Order Status:  Sent Specimen: Body Fluid from Pleural Fluid Updated: 01/18/24 1652    Fungus culture [[id number]] Collected: 01/18/24 1538    Order Status: Sent Specimen: Body Fluid from Pleural Fluid Updated: 01/18/24 1652    AFB stain [[id number]] Collected: 01/18/24 1538    Order Status: Sent Specimen: Body Fluid from Pleural Fluid Updated: 01/18/24 1652    Gram stain [[id number]] Collected: 01/18/24 1538    Order Status: Sent Specimen: Body Fluid from Pleural Fluid Updated: 01/18/24 1652    Culture, body fluid - Bactec [[id number]] Collected: 01/18/24 1537    Order Status: Sent Specimen: Body Fluid from Thoracentesis Fluid Updated: 01/18/24 1651    AFB culture [[id number]] Collected: 01/17/24 1555    Order Status: Completed Specimen: Body Fluid from Pleural Fluid Updated: 01/18/24 1415     AFB CULTURE STAIN No acid fast bacilli seen.    Culture, body fluid - Bactec [[id number]] Collected: 01/17/24 1555    Order Status: Completed Specimen: Body Fluid from Pleural Fluid Updated: 01/18/24 0115     Body Fluid Culture, Sterile No Growth to date    AFB stain [[id number]] Collected: 01/17/24 1555    Order Status: Completed Specimen: Body Fluid from Pleural Fluid Updated: 01/17/24 2012     Direct Acid Fast No acid fast bacilli seen.    Gram stain [[id number]] Collected: 01/17/24 1555    Order Status: Completed Specimen: Body Fluid from Pleural Fluid Updated: 01/17/24 1848     Gram Stain Result No WBC's      No organisms seen    JADEN prep [[id number]] Collected: 01/17/24 1555    Order Status: Completed Specimen: Body Fluid from Pleural Fluid Updated: 01/17/24 1836     KOH Prep No yeast or fungal elements seen    Fungus culture [[id number]] Collected: 01/17/24 1555    Order Status: Sent Specimen: Body Fluid from Pleural Fluid Updated: 01/17/24 1639    Blood culture [[id number]] Collected: 01/12/24 0411    Order Status: Completed Specimen: Blood from Peripheral, Foot, Left Updated: 01/17/24 0812     Blood Culture, Routine No growth after  5 days.    Culture, Respiratory with Gram Stain [[id number]]  (Abnormal)  (Susceptibility) Collected: 01/11/24 2030    Order Status: Completed Specimen: Sputum, Induced Updated: 01/15/24 0925     Respiratory Culture No S aureus isolated.      KLEBSIELLA PNEUMONIAE  Many        ESCHERICHIA COLI  Many  Normal respiratory ramu also present       Gram Stain (Respiratory) <10 epithelial cells per low power field.     Gram Stain (Respiratory) Moderate WBC's     Gram Stain (Respiratory) Many Gram negative rods     Gram Stain (Respiratory) Rare Gram positive cocci    Blood culture [[id number]]  (Abnormal) Collected: 01/11/24 1601    Order Status: Completed Specimen: Blood from Peripheral, Antecubital, Right Updated: 01/14/24 0812     Blood Culture, Routine Gram stain shahriar bottle: Gram positive cocci in clusters resembling Staph      Results called to and read back by: Gena UNGER RN 01/12/2024  18:43      COAGULASE-NEGATIVE STAPHYLOCOCCUS SPECIES  Organism is a probable contaminant      Culture, MRSA [[id number]] Collected: 01/11/24 1836    Order Status: Completed Specimen: MRSA source from Nares, Right Updated: 01/13/24 0844     MRSA Surveillance Screen No MRSA isolated    Rapid Organism ID by PCR (from Blood culture) [[id number]]  (Abnormal) Collected: 01/11/24 1601    Order Status: Completed Updated: 01/12/24 1956     Enterococcus faecalis Not Detected     Enterococcus faecium Not Detected     Listeria monocytogenes Not Detected     Staphylococcus spp. See species for ID     Staphylococcus aureus Not Detected     Staphylococcus epidermidis Detected     Staphylococcus lugdunensis Not Detected     Streptococcus species Not Detected     Streptococcus agalactiae Not Detected     Streptococcus pneumoniae Not Detected     Streptococcus pyogenes Not Detected     Acinetobacter calcoaceticus/baumannii complex Not Detected     Bacteroides fragilis Not Detected     Enterobacterales Not Detected     Enterobacter cloacae complex Not  Detected     Escherichia coli Not Detected     Klebsiella aerogenes Not Detected     Klebsiella oxytoca Not Detected     Klebsiella pneumoniae group Not Detected     Proteus Not Detected     Salmonella sp Not Detected     Serratia marcescens Not Detected     Haemophilus influenzae Not Detected     Neisseria meningtidis Not Detected     Pseudomonas aeruginosa Not Detected     Stenotrophomonas maltophilia Not Detected     Candida albicans Not Detected     Candida auris Not Detected     Candida glabrata Not Detected     Candida krusei Not Detected     Candida parapsilosis Not Detected     Candida tropicalis Not Detected     Cryptococcus neoformans/gattii Not Detected     CTX-M (ESBL ) Test Not Applicable     IMP (Carbapenem resistant) Test Not Applicable     KPC resistance gene (Carbapenem resistant) Test Not Applicable     mcr-1  Test Not Applicable     mec A/C  Not Detected     mec A/C and MREJ (MRSA) gene Test Not Applicable     NDM (Carbapenem resistant) Test Not Applicable     OXA-48-like (Carbapenem resistant) Test Not Applicable     van A/B (VRE gene) Test Not Applicable     VIM (Carbapenem resistant) Test Not Applicable            Significant Imaging: I have reviewed all pertinent imaging results/findings within the past 24 hours.

## 2024-01-18 NOTE — PROGRESS NOTES
[facility]  Adult Nutrition  Progress Note    SUMMARY       Recommendations    1. If and when medically feasible, recommend Renal, gluten free diet, texture per SLP      2. If and when medically feasible, recommend ONS Novasource renal BID      3. RD to monitor and follow      Goals: Meet % EEN, EPN by RD f/u  Nutrition Goal Status: progressing towards goal  Communication of RD Recs:  (POC)    Assessment and Plan    Nutrition Problem  Inadequate oral intake     Related to (etiology):   Decreased ability to consume sufficient energy     Signs and Symptoms (as evidenced by):   Restrictive diet       Interventions/Recommendations (treatment strategy):  Collaboration of nutrition care with other providers     Nutrition Diagnosis Status:   Continue    Reason for Assessment    Reason For Assessment: RD follow-up  Diagnosis:  (malignant carcinoid tumor of bronchus)  Relevant Medical History: T2DM, ESRD  Interdisciplinary Rounds: did not attend    General Information Comments: RD f/u: Pacemaker placed on 1/16 and Chest Tube placed on 1/17. Pt denies n/v/d/c. Caregivers were present in the room. Caregivers were requesting broth, RD stated that pt is NPO, and cannot provide. Caregivers stated that they understood. RD went over foods appropriate and told caregivers that rice is an appropriate grain for Gluten free and renal diets. Caregivers stated that they will ask pt's renal MD. Mild edema noted. Pt appears swollen. Pt is receiving CRRT. RD team to continue to monitor and f/u.    Nutrition Discharge Planning: Renal diet    Nutrition Risk Screen    Nutrition Risk Screen: no indicators present    Nutrition/Diet History    Patient Reported Diet/Restrictions/Preferences: gluten-free, diabetic diet, renal (Low potassium)  Food Preferences: No potatoes, grits, high potassium foods. Drink smoothies, eats strawberries, low sodium chicken broth, cabbage, carrots OK; Broiled fish/chicken  Spiritual,  "Cultural Beliefs, Orthodox Practices, Values that Affect Care: no  Food Allergies: wheat (gluten)  Factors Affecting Nutritional Intake: decreased appetite    Anthropometrics    Temp: 97.5 °F (36.4 °C)  Height Method: Stated  Height: 5' 5" (165.1 cm)  Height (inches): 65 in  Weight Method: Bed Scale  Weight: 77 kg (169 lb 12.1 oz)  Weight (lb): 169.76 lb  Ideal Body Weight (IBW), Female: 125 lb  % Ideal Body Weight, Female (lb): 121.6 %  BMI (Calculated): 28.2    Lab/Procedures/Meds    Pertinent Labs Reviewed: reviewed  Pertinent Labs Comments: GFR: 58.5, Ca: 8.0, ALP: 136, PRO: 4.8, alb: 2.0    Pertinent Medications Reviewed: reviewed  Pertinent Medications Comments: Acetylcysteine, dronabinol, insulin, abx, pantoprazole, polyethylene glycol, senna-docusate, NaCl    Estimated/Assessed Needs    Weight Used For Calorie Calculations: 57 kg (125 lb 10.6 oz) (d/t edema present)  Energy Calorie Requirements (kcal): 1710- 1995 kcal  Energy Need Method: Kcal/kg (30-35 kcal/kg of IBW)    Protein Requirements: 86-g (1.5g/kg of IBW)  Weight Used For Protein Calculations: 57 kg (125 lb 10.6 oz) (IBW d/t edema present)    RDA Method (mL): 1710    CHO Requirement: 214- 249 g    Nutrition Prescription Ordered    Current Diet Order: NPO  Nutrition Order Comments: -  Oral Nutrition Supplement: -    Evaluation of Received Nutrient/Fluid Intake    I/O: -1.6L since 1/11  Energy Calories Required: not meeting needs  Protein Required: not meeting needs  Fluid Required:  (as per MD)  Comments: LBM 1/12  Tolerance: not tolerating  % Intake of Estimated Energy Needs: 0 - 25 %  % Meal Intake: NPO    Nutrition Risk    Level of Risk/Frequency of Follow-up:  (1x/week)     Monitor and Evaluation    Food and Nutrient Intake: energy intake, food and beverage intake  Food and Nutrient Adminstration: diet order  Knowledge/Beliefs/Attitudes: food and nutrition knowledge/skill, beliefs and attitudes  Physical Activity and Function: nutrition-related " ADLs and IADLs  Anthropometric Measurements: height/length, weight, body mass index, weight change  Biochemical Data, Medical Tests and Procedures: electrolyte and renal panel, gastrointestinal profile, glucose/endocrine profile, inflammatory profile, lipid profile  Nutrition-Focused Physical Findings: overall appearance     Nutrition Follow-Up    RD Follow-up?: Yes

## 2024-01-18 NOTE — SUBJECTIVE & OBJECTIVE
Interval History: s/p micra placement with EP 1/16. Pacer set to 60 - while she does drift down, feels her symptoms are not as severe as at 40.   Got to chair briefly yesterday  Diffuse edema of BL upper and lower extremities  R CT guided chest tube via IR yesterday; 1460 cc output, serosanguinous, CXR much improved  For L CT guided chest tube today   SLED overnight  Feels tired       Medications:  Continuous Infusions:   sodium chloride 0.9% 10 mL/hr at 01/16/24 1200     Scheduled Meds:   droNABinol  5 mg Oral BID    epoetin noble (PROCRIT) injection  4,000 Units Intravenous Every Mon, Wed, Fri    [START ON 1/19/2024] insulin detemir U-100  8 Units Subcutaneous Daily    LIDOcaine  1 patch Transdermal Q24H    meropenem (MERREM) IVPB  500 mg Intravenous Q12H    oxybutynin  5 mg Oral TID    pantoprazole  40 mg Oral Daily    polyethylene glycol  17 g Oral Daily    senna-docusate 8.6-50 mg  1 tablet Oral Daily     PRN Meds:sodium chloride 0.9%, albuterol sulfate, bisacodyL, dextrose 10%, dextrose 10%, glucagon (human recombinant), glucose, glucose, heparin (porcine), hydrALAZINE, insulin aspart U-100, ondansetron, sodium chloride 0.9%     Review of patient's allergies indicates:   Allergen Reactions    Crestor [rosuvastatin] Swelling    Gluten protein      Objective:     Vital Signs (Most Recent):  Temp: 97.5 °F (36.4 °C) (01/18/24 1100)  Pulse: 86 (01/18/24 1143)  Resp: 20 (01/18/24 1143)  BP: 132/66 (01/18/24 1100)  SpO2: 100 % (01/18/24 1143) Vital Signs (24h Range):  Temp:  [96.3 °F (35.7 °C)-98 °F (36.7 °C)] 97.5 °F (36.4 °C)  Pulse:  [62-89] 86  Resp:  [8-42] 20  SpO2:  [91 %-100 %] 100 %  BP: ()/(51-81) 132/66     Intake/Output - Last 3 Shifts         01/16 0700  01/17 0659 01/17 0700  01/18 0659 01/18 0700 01/19 0659    P.O.       I.V. (mL/kg) 73.6 (1) 1354.3 (17.6) 1045 (13.6)    IV Piggyback 607.9 199.6     Total Intake(mL/kg) 681.5 (8.9) 1553.9 (20.2) 1045 (13.6)    Other  2621 1043    Stool       Chest  Tube  1460 280    Total Output  4081 1323    Net +681.5 -2527.1 -278           Stool Occurrence 1 x              SpO2: 100 %        Physical Exam  Vitals and nursing note reviewed.   Constitutional:       Appearance: Normal appearance.   HENT:      Head: Normocephalic and atraumatic.      Nose: Nose normal.   Cardiovascular:      Rate and Rhythm: Normal rate and regular rhythm.      Pulses: Normal pulses.   Pulmonary:      Effort: Pulmonary effort is normal. No respiratory distress.      Comments: Right posterior chest incision c/d/I    Right chest permacath    Right Chest tube, 10 fr, serosanguinous output, waterseal   Abdominal:      General: Abdomen is flat. There is no distension.      Palpations: Abdomen is soft.      Tenderness: There is no abdominal tenderness.      Comments: Soft, nontender abdomen   Musculoskeletal:      Right lower leg: Edema present.      Left lower leg: Edema present.   Skin:     General: Skin is warm and dry.      Coloration: Skin is pale.      Comments: Groin soft, dressings c/d/i   Neurological:      General: No focal deficit present.      Mental Status: She is alert.            Significant Labs:  CBC:   Recent Labs   Lab 01/18/24  0348   WBC 21.82*   RBC 2.45*   HGB 7.2*   HCT 22.5*      MCV 92   MCH 29.4   MCHC 32.0         Significant Diagnostics:  I have reviewed and interpreted all pertinent imaging results/findings within the past 24 hours.    VTE Risk Mitigation (From admission, onward)           Ordered     heparin 25,000 units in dextrose 5% 250 mL (100 units/mL) infusion LOW INTENSITY nomogram - [facility]  Continuous        Question:  Begin at (units/kg/hr)  Answer:  16    01/15/24 1038     heparin (porcine) injection 1,000 Units  As needed (PRN)         01/11/24 1351     heparin (porcine) injection 1,000 Units  As needed (PRN)         01/03/24 1123     IP VTE HIGH RISK PATIENT  Once         01/02/24 1226     Place CARMELITA hose  Until discontinued         01/02/24 1226      Place sequential compression device  Until discontinued         01/02/24 7470

## 2024-01-18 NOTE — ASSESSMENT & PLAN NOTE
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 Stepped up to the unit for persistent afib rvr. Had SBO (now resolved). 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  Pacemaker placed on 01/16. R Chest tube placed w IR on 1/17.      Neuro/Psych:   -- Sedation: none  -- Pain: Lidocaine patch   -- has some baseline anxiety, no longer on meds     Cards:   -- BP goals SBP >90, MAPs >55  -- Afib RVR   - Amio and BB held post cardiac arrest   - prn iv metoprolol for sustained RVR > 130's   - heparin gtt for anticoagulation    - transition to oral regiment once procedures complete  -- Asystolic arrest   - 1/12/24 patient went asystole. Had multiple bradycardic events prior to which had self resolved. At time of asystole, nurse performed 1 min of chest compressions and achieved ROSC. STAT TVP placement unable to be obtained due to central venous stenosis. 2nd episode of asystole resolved after minimal compressions. 3rd episode of asystole resolved with singular compression. S/p TVP via groin in Cath lab 1/12.   - TVP removed 1/16   - PPM 1/16, heparin gtt off and will continue until 1/21    Continues to have bradycardic events where she requires pacing.  She is symptomatic during these events , but is hemodynamically stable      Pacemaker is backed up at 60   - digoxin level high --> administered digibind 1/12, transitioned to normal sinus after administration. Dig level now within normal limits x 2     Pulm:   -- Goal O2 sat > 90%  -- on 1L satting upper 90's.  -- Chest tube d/c'd 1/8/24  -- CXR with pleural effusions and RLL opacification  -- CT chest (1/13): R worse than L pleural effusion, pericardial effusion   -- IR consulted for pleural effusions -- appreciate help   -- R CT placed 1/17   -- L CT to be placed 1/18 or tomorrow  -- continue incentive spirometry        Renal:  -- history  of ESRD, dialysis dependent   - CRRT per nephrology, ideally would work to transitionto intermittent HD  -- trend BUN/Cr  -- continue sevelamer TIDWM  -- patient is oliguric  -- permacath in place last exchanged 10/2023  -- fistula unable to be used  -- oxybutynin in effort to reduce possible bladder spasm triggering eulogio episodes    Recent Labs   Lab 01/17/24  1314 01/17/24  2218 01/18/24  0348   BUN 31* 19  19 9  9  10   CREATININE 2.5* 1.4  1.4 1.0  1.0  1.0           FEN / GI:   -- CT on 1/8/24 demonstrating SBO, now resolved  -- US on 1/8/24 showing distended gallbladder without signs of acute cholecystitis   - NGT removed.  - Equivocal GGC 1/10  - DC Reglan 1/17  -- PPI  -- Replace lytes as needed  -- Nutrition: diabetic/renal/gluten free soft and bite sized diet with novasource daily  - Can restart diet if patient is not going to get her IR procedure today. Aspiration precautions are still of utmost importance. Appreciate nursing assistance with pt eating. Dietician consulted for complex dietary restriction  - home appetite stimulant dronabinol 5 mg BID      ID:   -- Tm: afebrile; WBC decreased to 21 from 23 from 30  -- C/f HAP with RLL consolidation  -- MRSA neg, dc vanc  -- Cont zosyn for 7 day course  -- blood cultures with 1 bottle growing staph epi, likely a contaminant.  -- follow up cultures from pleural fluid, appreciate ID recommendations and assistance    Recent Labs   Lab 01/16/24  0255 01/17/24  0316 01/18/24  0348   WBC 30.10* 23.53* 21.82*           Heme/Onc:  -- Daily CBC  -- on heparin gtt for A-fib, hold hep 5 days after PPM 1/16 (restart 1/21)  Recent Labs   Lab 01/12/24  0422 01/12/24  0631 01/12/24  1316 01/16/24  0255 01/17/24  0316 01/18/24  0348   HGB 8.9*  --    < > 7.7* 7.6* 7.2*     --    < > 288 256 169   APTT 33.9* 62.5*   < > 22.8 23.9 22.2   INR 1.2 1.2  --   --   --   --     < > = values in this interval not displayed.           Endo:   -- history of diabetes  --  Gluc goal 140-180  -- SSI      PPx:   Feeding: diabetic/renal/gluten diet and novasource (restart after procedure)  Analgesia/Sedation:  Pain is controlled, no sedation  Thromboembolic prevention: heparin gtt held for PPM  HOB >30: yes  Stress Ulcer ppx: pantoprazole  Glucose control: Critical care goal 140-180 g/dl, SSI  Bowel reg:  having BM  Invasive Lines/Drains/Airway: Permacath, PIV x2, TVP groin line  Deescalation: none        Dispo/Code Status/Palliative:   -- SICU/ Full Code

## 2024-01-18 NOTE — ASSESSMENT & PLAN NOTE
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 Stepped up to the unit for persistent afib rvr. Had SBO (now resolved). 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  Pacemaker placed on 01/16. R Chest tube placed w IR on 1/17. L sided chest tube placed w IR on 1/18. Cumulative chest tube output of 3.8L. CXR stable. Pleural cultures NGTD.      Neuro/Psych:   -- Sedation: none  -- Pain: Lidocaine patch   -- has some baseline anxiety, no longer on meds     Cards:   -- BP goals SBP >90, MAPs >55  -- Afib RVR, appears to have resolved as she has been NSR in 80's   - Amio and BB held post cardiac arrest   - prn iv metoprolol for sustained RVR > 130's   - AC discontinued in setting of getting PPM  -- Asystolic arrest, now s/p PPM 1/16 rate set to 60 bmp   - 1/12/24 patient asystole with 1 round of compressions and achievement of ROSC. Multiple bradycardic events prior to which had self resolved. STAT TVP placement unsuccessful 2/2 central venous stenosis. 2nd episode of asystole resolved after minimal compressions. 3rd episode of asystole resolved with singular compression.   - S/p TVP via groin in Cath lab 1/12, removed 1/16   - PPM 1/16 @ 60, heparin gtt hold x 5 days (okay to resume AC on 1/21)  - Symptomatic bradycardic events continue to occur, requiring pacing but remains HDS   - high digoxin level now s/p digibind 1/12 and converted to NSR after administration  - dig level now within normal limits x 2     Pulm:   -- Goal O2 sat > 90%   - satting 100% on RA  -- Surgical chest tube d/c'd 1/8/24  -- CXR with pleural effusions and RLL opacification  -- CT chest (1/13): R worse than L pleural effusion, pericardial effusion   -- IR consulted to place bilateral chest tubes for pleural effusions, 3.8L output total   -- R CT placed 1/17   -- L CT placed 1/18  -- continue incentive  spirometry        Renal:  -- history of ESRD, dialysis dependent   - CRRT per nephrology, ideally would work to transition to intermittent HD  -- trend BUN/Cr  -- holding sevelamer in setting of poor PO intake and daily low phos  -- patient is oliguric  -- permacath in place last exchanged 10/2023  -- fistula unable to be used  -- oxybutynin in effort to reduce possible bladder spasm triggering eulogio episodes    Recent Labs   Lab 01/17/24  1314 01/17/24  2218 01/18/24  0348   BUN 31* 19  19 9  9  10   CREATININE 2.5* 1.4  1.4 1.0  1.0  1.0           FEN / GI:   -- CT on 1/8/24 demonstrating SBO, now resolved  -- US on 1/8/24 showing distended gallbladder without signs of acute cholecystitis   - NGT removed.  - Equivocal GGC 1/10  - DC Reglan 1/17  -- PPI  -- Replace lytes as needed  -- Nutrition: diabetic/renal/gluten free soft and bite sized diet with novasource daily  - Aspiration precautions are still of utmost importance  - Appreciate nursing assistance with pt eating  - Dietician consulted for complex dietary restriction  - home appetite stimulant dronabinol 5 mg BID      ID:   -- Tm: afebrile; WBC stable  -- C/f HAP with RLL consolidation  -- MRSA neg, dc vanc  -- zosyn broadened to meropenem giving pleural effusions  -- blood cultures with 1 bottle growing staph epi, likely a contaminant  -- follow up cultures from pleural fluid, appreciate ID recommendations and assistance    Recent Labs   Lab 01/16/24  0255 01/17/24  0316 01/18/24  0348   WBC 30.10* 23.53* 21.82*           Heme/Onc:  -- Daily CBC  -- heparin gtt for A-fib, hold 5 days after PPM 1/16 (can restart 1/21)  Recent Labs   Lab 01/12/24  0422 01/12/24  0631 01/12/24  1316 01/16/24  0255 01/17/24  0316 01/18/24  0348   HGB 8.9*  --    < > 7.7* 7.6* 7.2*     --    < > 288 256 169   APTT 33.9* 62.5*   < > 22.8 23.9 22.2   INR 1.2 1.2  --   --   --   --     < > = values in this interval not displayed.           Endo:   -- history of  diabetes  -- Gluc goal 140-180  -- SSI      PPx:   Feeding: diabetic/renal/gluten diet and novasource (restart after procedure)  Analgesia/Sedation:  Pain is controlled, no sedation  Thromboembolic prevention: heparin gtt held  HOB >30: yes  Stress Ulcer ppx: pantoprazole  Glucose control: Critical care goal 140-180 g/dl, SSI  Bowel reg:  having BM  Invasive Lines/Drains/Airway: Permacath, PIV x2  Deescalation: none        Dispo/Code Status/Palliative:   -- SICU/ Full Code   -- Patient needs to get out of bed and move, appreciate nursing, and PT and OT assistance/help in this matter

## 2024-01-18 NOTE — SUBJECTIVE & OBJECTIVE
Interval History: SLED overnight. Net -2.5L. On 2L NC. BP 100s-130s/50-60s. S/p Micra and R. Chest tube yesterday. CXR improved. Plan for left chest tube placement today.     Review of patient's allergies indicates:   Allergen Reactions    Crestor [rosuvastatin] Swelling    Gluten protein      Current Facility-Administered Medications   Medication Frequency    0.9%  NaCl infusion PRN    0.9%  NaCl infusion Continuous    albuterol sulfate nebulizer solution 2.5 mg Q4H PRN    bisacodyL suppository 10 mg Daily PRN    dextrose 10% bolus 125 mL 125 mL PRN    dextrose 10% bolus 250 mL 250 mL PRN    droNABinol capsule 5 mg BID    epoetin noble injection 4,000 Units Every Mon, Wed, Fri    glucagon (human recombinant) injection 1 mg PRN    glucose chewable tablet 16 g PRN    glucose chewable tablet 24 g PRN    heparin (porcine) injection 1,000 Units PRN    hydrALAZINE injection 10 mg Q6H PRN    insulin aspart U-100 pen 0-10 Units Q4H PRN    [START ON 1/19/2024] insulin detemir U-100 (Levemir) pen 8 Units Daily    LIDOcaine 5 % patch 1 patch Q24H    meropenem (MERREM) 500 mg in sodium chloride 0.9 % 100 mL IVPB (MB+) Q12H    ondansetron injection 8 mg Q8H PRN    oxybutynin tablet 5 mg TID    pantoprazole EC tablet 40 mg Daily    polyethylene glycol packet 17 g Daily    senna-docusate 8.6-50 mg per tablet 1 tablet Daily    sodium chloride 0.9% flush 10 mL PRN       Objective:     Vital Signs (Most Recent):  Temp: 97.5 °F (36.4 °C) (01/18/24 1100)  Pulse: 83 (01/18/24 1115)  Resp: (!) 8 (01/18/24 1115)  BP: 132/66 (01/18/24 1100)  SpO2: 100 % (01/18/24 1115) Vital Signs (24h Range):  Temp:  [96.3 °F (35.7 °C)-98 °F (36.7 °C)] 97.5 °F (36.4 °C)  Pulse:  [62-89] 83  Resp:  [8-42] 8  SpO2:  [91 %-100 %] 100 %  BP: ()/(51-81) 132/66     Weight: 77 kg (169 lb 12.1 oz) (01/17/24 0615)  Body mass index is 28.25 kg/m².  Body surface area is 1.88 meters squared.    I/O last 3 completed shifts:  In: 1648.6 [I.V.:1354.3; IV  Piggyback:294.4]  Out: 4081 [Other:2621; Chest Tube:1460]     Physical Exam  Vitals and nursing note reviewed.   Constitutional:       Appearance: She is well-developed. She is ill-appearing.   HENT:      Head: Normocephalic and atraumatic.      Right Ear: External ear normal.      Left Ear: External ear normal.   Eyes:      General: No scleral icterus.        Right eye: No discharge.         Left eye: No discharge.      Conjunctiva/sclera: Conjunctivae normal.   Cardiovascular:      Rate and Rhythm: Normal rate and regular rhythm.      Heart sounds: Normal heart sounds. No murmur heard.     No friction rub. No gallop.   Pulmonary:      Effort: Pulmonary effort is normal. No respiratory distress.      Breath sounds: No stridor. Examination of the right-lower field reveals decreased breath sounds. Decreased breath sounds present. No wheezing or rales.   Abdominal:      General: Bowel sounds are normal.   Musculoskeletal:         General: No tenderness.      Right lower leg: Edema present.      Left lower leg: Edema present.   Skin:     General: Skin is warm and dry.   Neurological:      Mental Status: She is alert and oriented to person, place, and time.          Significant Labs:  CBC:   Recent Labs   Lab 01/18/24  0348   WBC 21.82*   RBC 2.45*   HGB 7.2*   HCT 22.5*      MCV 92   MCH 29.4   MCHC 32.0     CMP:   Recent Labs   Lab 01/18/24  0348   GLU 99  99  99   CALCIUM 8.0*  8.0*  8.0*   ALBUMIN 2.0*  2.0*  2.0*   PROT 4.8*     136  138   K 4.9  4.9  5.0   CO2 23  23  21*     108  109   BUN 9  9  10   CREATININE 1.0  1.0  1.0   ALKPHOS 136*   ALT 8*   AST 17   BILITOT 0.8     All labs within the past 24 hours have been reviewed.

## 2024-01-18 NOTE — PT/OT/SLP PROGRESS
Physical Therapy  Co-Treatment with OT    Patient Name:  Lily Green   MRN:  [id number]    Recommendations:     Discharge Recommendations: Moderate Intensity Therapy  Discharge Equipment Recommendations: none  Barriers to discharge: Decreased caregiver support    Assessment:     Lily Green is a 73 y.o. female admitted with a medical diagnosis of Malignant carcinoid tumor of bronchus.  She presents with the following impairments/functional limitations: impaired balance, decreased safety awareness, impaired endurance, impaired functional mobility, gait instability, decreased coordination pt tolerated treatment better being able to sit on  EOB longer and needing less assistance for mobility. Pt will benefit from cont skilled PT 3x/wk to progress physically.  Pt is significantly below previous functional level, increased risk of falls and increased burden of care currently. Patient continues to demonstrate the need for moderate intensity therapy on a daily basis post acute exhibited by decreased independence with functional mobility Pt was evaluated 1/3 with dx of malignant CA tumor of bronchus. Pt is s/p lymphadenectomy, thoracotomy, resection wedge R lung 1/2. Pt was transferred to ICU 1/5 with tachycardia with syncope. pt is s/p PPM placement 1/16 through groin with no pacemaker precautions.      Rehab Prognosis: Good; patient would benefit from acute skilled PT services to address these deficits and reach maximum level of function.    Recent Surgery: Procedure(s) (LRB):  INSERTION, CARDIAC PACEMAKER, LEADLESS (N/A) 2 Days Post-Op    Plan:     During this hospitalization, patient to be seen 3 x/week to address the identified rehab impairments via gait training, therapeutic activities and progress toward the following goals:    Plan of Care Expires:  02/08/24    Subjective     Chief Complaint: pt had no complaints during treatment.   Patient/Family Comments/goals: to get better and go home.    Pain/Comfort:  Pain Rating 1: 0/10  Pain Rating Post-Intervention 1: 0/10      Objective:     Communicated with nurse  prior to session.  Patient found supine with telemetry, pulse ox (continuous), chest tube, oxygen, SCD (R subclavian dialysis catheter)  peripheral IV upon PT entry to room.     General Precautions: Standard, fall  Orthopedic Precautions: N/A  Braces: N/A  Respiratory Status: Nasal cannula, flow 2 L/min     Functional Mobility:  Bed Mobility:pt needed verbal cues for hand placement and sequencing for functional mobility.    Rolling Left:  maximal assistance  Supine to Sit: maximal assistance    Transfers:     Sit to Stand:  minimum assistance with hand-held assist  Bed to Chair: minimum assistance with  hand-held assist  using  Stand Pivot    Balance: pt sat on EOB x 11 min with SBA. Pt performed ADLS with OT    Due to pt complex medical condition, the skill of 2 licensed therapists is needed to maximize treatment session and progression towards goals  Pt white board updated with current therapists name and level of mobility assistance needed.       AM-PAC 6 CLICK MOBILITY  Turning over in bed (including adjusting bedclothes, sheets and blankets)?: 2  Sitting down on and standing up from a chair with arms (e.g., wheelchair, bedside commode, etc.): 3  Moving from lying on back to sitting on the side of the bed?: 2  Moving to and from a bed to a chair (including a wheelchair)?: 3  Need to walk in hospital room?: 1  Climbing 3-5 steps with a railing?: 1  Basic Mobility Total Score: 12       Treatment & Education:  Pt and  received verbal instructions in PT POC and both verbally expressed understanding of such.     Patient left up in chair with all lines intact, call button in reach, RN  notified, and  present..    GOALS:   Multidisciplinary Problems       Physical Therapy Goals          Problem: Physical Therapy    Goal Priority Disciplines Outcome Goal Variances Interventions    Physical Therapy Goal     PT, PT/OT Ongoing, Progressing     Description: PT goals until 2/9/24    1. Pt supine to sit with supervision-not met  2. Pt sit to supine with supervision-not met  3. Pt sit to stand with AD if needed with supervision-not met  4. Pt to perform gait 150 ft with AD if needed with supervision.-not met  5. Pt to go up/down curb step with AD if needed with CGA.-not met                         Time Tracking:     PT Received On: 01/18/24  PT Start Time: 1029     PT Stop Time: 1052  PT Total Time (min): 23 min     Billable Minutes: Therapeutic Activity 23 min     Treatment Type: Treatment  PT/PTA: PT     Number of PTA visits since last PT visit: 0     01/18/2024

## 2024-01-18 NOTE — ASSESSMENT & PLAN NOTE
#Bradycardia    73yoF with ESRD admitted for bronchial tumor removal with post op course complicated by atrial fibrillation. She had difficult to control rates but further post-operatively, her rates improved so was weaned off rate controlling agents. Overnight 1/11-1/12 and morning of 1/12 she had several bradycardic episodes in the setting of atrial fibrillation, recent digoxin use (reportedly renally dosed, but some high levels with ESRD).    In the setting of asystolic events with several short rounds of compressions (no more than 1 round at a time), TVP was attempted. 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 She is post PTA and stenting of the left innominate vein. This was done at an outside hospital. Micra implantation was decided on due to infection risk and difficult venous anatomy.    EF 60-65% during this admission  Jan 13, 2024 EKG: Sinus rhythm, QRS 76    Recommendations:  - Micra in placed 1/16  - S/p chest tube 1/17  - Potential contralateral effusion drainage per IR today  - When done with procedures can reset her Micra back to VDD 50

## 2024-01-18 NOTE — PT/OT/SLP PROGRESS
Occupational Therapy   Co-Treatment    Name: Lily Green  MRN: [id number]  Admitting Diagnosis:  Malignant carcinoid tumor of bronchus  2 Days Post-Op    Recommendations:     Discharge Recommendations: Moderate Intensity Therapy  Discharge Equipment Recommendations:  none  Barriers to discharge:  None    Assessment:     Lily Green is a 73 y.o. female with a medical diagnosis of Malignant carcinoid tumor of bronchus.  She presents agreeable to session . Performance deficits affecting function are weakness, impaired self care skills, impaired balance, impaired functional mobility, impaired endurance, impaired cardiopulmonary response to activity, gait instability, pain, edema. Pt benefits from co-treatment with PT to accommodate pt's activity tolerance and progression with therapy.      Rehab Prognosis:  Good; patient would benefit from acute skilled OT services to address these deficits and reach maximum level of function.       Plan:     Patient to be seen 3 x/week to address the above listed problems via self-care/home management, therapeutic activities, therapeutic exercises  Plan of Care Expires: 02/02/24  Plan of Care Reviewed with: patient, spouse    Subjective     Chief Complaint: denies  Patient/Family Comments/goals: to get better and go home  Pain/Comfort:  Pain Rating 1: 0/10  Pain Rating Post-Intervention 1: 0/10    Objective:     Communicated with: RN prior to session.  Patient found supine with blood pressure cuff, pulse ox (continuous), telemetry, Trialysis, chest tube, oxygen upon OT entry to room.    General Precautions: Standard, fall    Orthopedic Precautions:N/A  Braces: N/A  Respiratory Status: Nasal cannula, flow 2 L/min     Occupational Performance:     Bed Mobility:    Patient completed Scooting/Bridging with maximal assistance  Patient completed Supine to Sit with maximal assistance     Functional Mobility/Transfers:  Patient completed Sit <> Stand Transfer with  minimum assistance  with  no assistive device   Patient completed Bed <> Chair Transfer using Stand Pivot technique with minimum assistance with no assistive device      Activities of Daily Living:  Grooming: stand by assistance seated EOB      AMPAC 6 Click ADL: 16    Treatment & Education:  Pt ed on OT POC  Pt sat EOB x 11 min with SBA while engaged in ROM ex's and grooming tasks  Pt ed on hand-over-heart pumps for edema management  Pt benefits from co-treatment with PT to accommodate pt's activity tolerance and progression with therapy.    Patient left up in chair with all lines intact, call button in reach, RN notified, and spouse present    GOALS:   Multidisciplinary Problems       Occupational Therapy Goals          Problem: Occupational Therapy    Goal Priority Disciplines Outcome Interventions   Occupational Therapy Goal     OT, PT/OT Ongoing, Progressing    Description: Goals to be met by: 02/2/24     Patient will increase functional independence with ADLs by performing:    UE Dressing with Supervision.  Grooming while standing at sink with Modified [place].  Toileting from toilet with Modified [place] for hygiene and clothing management.   Supine to sit with Supervision.  Toilet transfer to toilet with Supervision.                         Time Tracking:     OT Date of Treatment: 01/18/24  OT Start Time: 1028  OT Stop Time: 1052  OT Total Time (min): 24 min    Billable Minutes:Self Care/Home Management 23               1/18/2024

## 2024-01-18 NOTE — ASSESSMENT & PLAN NOTE
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 On HD ~ 1 year. Last HD prior to presentation 1/1/24. Pt has NELY AVF placed on 10/23 that has not been cleared by vascular. Currently using RIJ TDC.     During her admission: stepped up to ICU for Afib with RVR. Was started on BB, amio and digoxin. However 1/12/24, had multiple episodes of eulogio with few asystole requiring chest compressions. Digoxin level elevated and dig-meaghan was given with resulting decrease in levels.     Nephrology History  iHD Schedule: TTS   Unit/MD: [facility]/ Dr. Vyas  Duration: 3.5 hours   EDW: 58 kg   Access: RIJ TDC  Residual Renal Function: minimal       Volume status: net positive this admission (records indicate 5 liters)    Assessment:     -No further plans for RRT today, plan for HD tomorrow 1/19 if continues to be HD stable.    -Keep MAP > 65  -Keep hemoglobin > 7  -Strict ins and outs  -Avoid nephrotoxic agents if possible and renally dose medications  -Avoid drastic hemodynamic changes if possible

## 2024-01-18 NOTE — H&P
See IR consult dated 1/18/24    Mackenzie Campbell PA-C  Interventional Radiology   Spectra: [phone]

## 2024-01-18 NOTE — PROGRESS NOTES
01/18/24 0406   Treatment   Treatment Type SLED   Treatment Status Restart   Dialysis Machine Number [id number]   Dialyzer Time (hours) 8.56   BVP (Liters) 74.1 L   Solutions Labeled and Current  Yes   Access Tunneled Cath;Right;IJ   Catheter Dressing Intact  Yes   Alarms Engaged Yes   CRRT Comments CRRT tx restarted post clotting.

## 2024-01-18 NOTE — SUBJECTIVE & OBJECTIVE
Interval History:  Tele with sinus rhythm, bradycardic episodes to backup pacing rate. Micra set to VVI 60 while possibly getting further procedures.     Review of Systems   Constitutional: Negative for diaphoresis and fever.   Cardiovascular:  Negative for chest pain, dyspnea on exertion, leg swelling, near-syncope, orthopnea, palpitations, paroxysmal nocturnal dyspnea and syncope.   Respiratory:  Negative for cough and shortness of breath.    Gastrointestinal:  Negative for abdominal pain, diarrhea, nausea and vomiting.   Neurological:  Negative for light-headedness.   Psychiatric/Behavioral:  Negative for altered mental status and substance abuse.      Objective:     Vital Signs (Most Recent):  Temp: 98 °F (36.7 °C) (01/18/24 0700)  Pulse: 82 (01/18/24 0900)  Resp: (!) 25 (01/18/24 0900)  BP: 119/61 (01/18/24 0845)  SpO2: 100 % (01/18/24 0900) Vital Signs (24h Range):  Temp:  [96.3 °F (35.7 °C)-98 °F (36.7 °C)] 98 °F (36.7 °C)  Pulse:  [62-91] 82  Resp:  [10-49] 25  SpO2:  [90 %-100 %] 100 %  BP: ()/(51-81) 119/61     Weight: 77 kg (169 lb 12.1 oz)  Body mass index is 28.25 kg/m².     SpO2: 100 %        Physical Exam  Constitutional:       Appearance: Normal appearance.   Cardiovascular:      Rate and Rhythm: Normal rate and regular rhythm.      Pulses: Normal pulses.      Heart sounds: Normal heart sounds.      Comments: TVP in place  Pulmonary:      Effort: Pulmonary effort is normal.      Breath sounds: Normal breath sounds. No wheezing or rales.   Abdominal:      General: Abdomen is flat. There is no distension.      Palpations: Abdomen is soft.      Tenderness: There is no abdominal tenderness.   Musculoskeletal:      Right lower leg: No edema.      Left lower leg: No edema.   Skin:     General: Skin is warm and dry.   Neurological:      Mental Status: She is alert and oriented to person, place, and time. Mental status is at baseline.   Psychiatric:         Mood and Affect: Mood normal.          Behavior: Behavior normal.            Significant Labs: All pertinent lab results from the last 24 hours have been reviewed.    Significant Imaging:  Reviewed

## 2024-01-18 NOTE — PROGRESS NOTES
01/18/24 0937   Treatment   Treatment Type SLED   Treatment Status Discontinued treatment;Blood returned   Dialyzer Time (hours) 14.22   BVP (Liters) 140.7 L   CRRT Comments blood returned by primary rn as ordered   CRRT Hourly Documentation   Total UF (Hourly Cleared) (mL) 153     SLED completed. Blood returned by primary RN as ordered. Machine disinfected at this time.

## 2024-01-18 NOTE — PROCEDURES
VIR Post-Procedure Note    Pre Op Diagnosis:  Loculated pleural effusion    Post Op Diagnosis: same    Procedure: Image-guided Chest Tube Placement    Procedure performed by:  Navya Richards MD / Dheeraj Antunez MD     Written Informed Consent Obtained:  Yes    Specimen Removed:  Yes    Estimated Blood Loss:  Minimal     Findings:     Local anesthesia and moderate sedation were used.    Successful placement of 10 Fr chest tube in the L pleural space.    40 fluid was removed and sent to the lab for further analysis.       The patient tolerated the procedure well and there were no complications.  Please see Imaging report for further details.       Navya Richards MD MSCR  PGY-5 Radiology Resident

## 2024-01-18 NOTE — PROGRESS NOTES
[facility]  Critical Care - Surgery  Progress Note    Patient Name: Lily Green  MRN: [id number]  Admission Date: 1/2/2024  Hospital Length of Stay: 16 days  Code Status: Full Code  Attending Provider: Tan Thomson MD  Primary Care Provider: Pavel Calixto MD   Principal Problem: Malignant carcinoid tumor of bronchus    Subjective:     Interval History/Significant Events:   CT placement yesterday, will go with IR today for L side.  Stable overnight  Pacemaker backed up at 60    POD 2 from micra placement  POD 16 from thoracotomy      Objective:     Vital Signs (Most Recent):  Temp: 97.5 °F (36.4 °C) (01/18/24 1100)  Pulse: 83 (01/18/24 1100)  Resp: 15 (01/18/24 1100)  BP: 131/61 (01/18/24 0930)  SpO2: 98 % (01/18/24 1030) Vital Signs (24h Range):  Temp:  [96.3 °F (35.7 °C)-98 °F (36.7 °C)] 97.5 °F (36.4 °C)  Pulse:  [62-89] 83  Resp:  [10-42] 15  SpO2:  [91 %-100 %] 98 %  BP: ()/(51-81) 131/61     Weight: 77 kg (169 lb 12.1 oz)  Body mass index is 28.25 kg/m².      Intake/Output Summary (Last 24 hours) at 1/18/2024 1124  Last data filed at 1/18/2024 1101  Gross per 24 hour   Intake 2198.94 ml   Output 5404 ml   Net -3205.06 ml          Physical Exam  Vitals and nursing note reviewed.   Constitutional:       General: She is not in acute distress.     Appearance: Normal appearance. She is normal weight. She is ill-appearing. She is not toxic-appearing.   HENT:      Head: Normocephalic and atraumatic.      Right Ear: External ear normal.      Left Ear: External ear normal.      Nose: Nose normal.      Comments: Nasal cannula in place  Eyes:      General: No scleral icterus.        Right eye: No discharge.         Left eye: No discharge.      Extraocular Movements: Extraocular movements intact.      Conjunctiva/sclera: Conjunctivae normal.   Cardiovascular:      Rate and Rhythm: Normal rate and regular rhythm.   Pulmonary:      Effort: Pulmonary effort is normal. No  respiratory distress.      Comments: R chest tube in place  Abdominal:      General: Abdomen is flat. There is no distension.      Palpations: Abdomen is soft. There is no mass.      Tenderness: There is no abdominal tenderness. There is no guarding.      Hernia: No hernia is present.   Genitourinary:     Comments: Groin incisions covered with gauze and Tegaderm bilaterally, no breakthrough bleeding or hematomas noted  Musculoskeletal:         General: Normal range of motion.      Cervical back: Normal range of motion and neck supple.   Skin:     General: Skin is warm and dry.   Neurological:      General: No focal deficit present.      Mental Status: She is alert and oriented to person, place, and time. Mental status is at baseline.   Psychiatric:         Mood and Affect: Mood normal.         Behavior: Behavior normal.         Thought Content: Thought content normal.            Vents:  Oxygen Concentration (%): 24 (01/15/24 0200)    Lines/Drains/Airways       Central Venous Catheter Line  Duration                  Hemodialysis Catheter 01/25/23 1501 right internal jugular 357 days              Drain  Duration                  Chest Tube 01/17/24 1504 Right Pleural 10 Fr. <1 day              Peripheral Intravenous Line  Duration                  Hemodialysis AV Fistula 09/01/23 0800 Left forearm 139 days         Peripheral IV - Single Lumen 01/15/24 1230 20 G;1 3/4 in Anterior;Proximal;Right Forearm 2 days         Peripheral IV - Single Lumen 01/16/24 1300 20 G Anterior;Proximal;Right Forearm 1 day                    Significant Labs:    CBC/Anemia Profile:  Recent Labs   Lab 01/17/24  0316 01/18/24  0348   WBC 23.53* 21.82*   HGB 7.6* 7.2*   HCT 23.2* 22.5*    169   MCV 88 92   RDW 19.9* 20.7*        Chemistries:  Recent Labs   Lab 01/17/24  0316 01/17/24  1314 01/17/24  2218 01/18/24  0348   * 134* 135*  135* 136  136  138   K 4.8 4.5 4.4  4.4 4.9  4.9  5.0    104 106  106 108  108  109    CO2 22* 21* 23  23 23  23  21*   BUN 25* 31* 19  19 9  9  10   CREATININE 1.9* 2.5* 1.4  1.4 1.0  1.0  1.0   CALCIUM 8.4* 8.7 8.0*  8.0* 8.0*  8.0*  8.0*   ALBUMIN 2.4* 2.3* 2.0*  2.0* 2.0*  2.0*  2.0*   PROT 5.8*  --   --  4.8*   BILITOT 0.7  --   --  0.8   ALKPHOS 158*  --   --  136*   ALT 7*  --   --  8*   AST 20  --   --  17   MG 2.4  --  2.1  2.1 1.8  1.8   PHOS 5.6* 5.2* 3.2  3.2 2.4*  2.4*  2.4*       All pertinent labs within the past 24 hours have been reviewed.    Significant Imaging:  I have reviewed all pertinent imaging results/findings within the past 24 hours.  Assessment/Plan:     Cardiac/Vascular  Atrial fibrillation  74 yo female with 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 Stepped up to the unit for persistent afib rvr. Had SBO (now resolved). 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  Pacemaker placed on 01/16. R Chest tube placed w IR on 1/17.      Neuro/Psych:   -- Sedation: none  -- Pain: Lidocaine patch   -- has some baseline anxiety, no longer on meds     Cards:   -- BP goals SBP >90, MAPs >55  -- Afib RVR   - Amio and BB held post cardiac arrest   - prn iv metoprolol for sustained RVR > 130's   - heparin gtt for anticoagulation    - transition to oral regiment once procedures complete  -- Asystolic arrest   - 1/12/24 patient went asystole. Had multiple bradycardic events prior to which had self resolved. At time of asystole, nurse performed 1 min of chest compressions and achieved ROSC. STAT TVP placement unable to be obtained due to central venous stenosis. 2nd episode of asystole resolved after minimal compressions. 3rd episode of asystole resolved with singular compression. S/p TVP via groin in Cath lab 1/12.   - TVP removed 1/16   - PPM 1/16, heparin gtt off and will continue until 1/21    Continues to have bradycardic events where she  requires pacing.  She is symptomatic during these events , but is hemodynamically stable      Pacemaker is backed up at 60   - digoxin level high --> administered digibind 1/12, transitioned to normal sinus after administration. Dig level now within normal limits x 2     Pulm:   -- Goal O2 sat > 90%  -- on 1L satting upper 90's.  -- Chest tube d/c'd 1/8/24  -- CXR with pleural effusions and RLL opacification  -- CT chest (1/13): R worse than L pleural effusion, pericardial effusion   -- IR consulted for pleural effusions -- appreciate help   -- R CT placed 1/17   -- L CT to be placed 1/18 or tomorrow  -- continue incentive spirometry        Renal:  -- history of ESRD, dialysis dependent   - CRRT per nephrology, ideally would work to transitionto intermittent HD  -- trend BUN/Cr  -- continue sevelamer TIDWM  -- patient is oliguric  -- permacath in place last exchanged 10/2023  -- fistula unable to be used  -- oxybutynin in effort to reduce possible bladder spasm triggering eulogio episodes    Recent Labs   Lab 01/17/24  1314 01/17/24  2218 01/18/24  0348   BUN 31* 19  19 9  9  10   CREATININE 2.5* 1.4  1.4 1.0  1.0  1.0           FEN / GI:   -- CT on 1/8/24 demonstrating SBO, now resolved  -- US on 1/8/24 showing distended gallbladder without signs of acute cholecystitis   - NGT removed.  - Equivocal GGC 1/10  - DC Reglan 1/17  -- PPI  -- Replace lytes as needed  -- Nutrition: diabetic/renal/gluten free soft and bite sized diet with novasource daily  - Can restart diet if patient is not going to get her IR procedure today. Aspiration precautions are still of utmost importance. Appreciate nursing assistance with pt eating. Dietician consulted for complex dietary restriction  - home appetite stimulant dronabinol 5 mg BID      ID:   -- Tm: afebrile; WBC decreased to 21 from 23 from 30  -- C/f HAP with RLL consolidation  -- MRSA neg, dc vanc  -- Cont zosyn for 7 day course  -- blood cultures with 1 bottle growing  staph epi, likely a contaminant.  -- follow up cultures from pleural fluid, appreciate ID recommendations and assistance    Recent Labs   Lab 01/16/24  0255 01/17/24  0316 01/18/24  0348   WBC 30.10* 23.53* 21.82*           Heme/Onc:  -- Daily CBC  -- on heparin gtt for A-fib, hold hep 5 days after PPM 1/16 (restart 1/21)  Recent Labs   Lab 01/12/24  0422 01/12/24  0631 01/12/24  1316 01/16/24  0255 01/17/24  0316 01/18/24  0348   HGB 8.9*  --    < > 7.7* 7.6* 7.2*     --    < > 288 256 169   APTT 33.9* 62.5*   < > 22.8 23.9 22.2   INR 1.2 1.2  --   --   --   --     < > = values in this interval not displayed.           Endo:   -- history of diabetes  -- Gluc goal 140-180  -- SSI      PPx:   Feeding: diabetic/renal/gluten diet and novasource (restart after procedure)  Analgesia/Sedation:  Pain is controlled, no sedation  Thromboembolic prevention: heparin gtt held for PPM  HOB >30: yes  Stress Ulcer ppx: pantoprazole  Glucose control: Critical care goal 140-180 g/dl, SSI  Bowel reg:  having BM  Invasive Lines/Drains/Airway: Permacath, PIV x2, TVP groin line  Deescalation: none        Dispo/Code Status/Palliative:   -- SICU/ Full Code     --Patient needs to get out of bed and move, appreciate nursing, and PT and OT assistance and help in this matter.  We will follow up extremity ultrasounds to evaluate for DVT.  Low suspicion     Brooklyn Sapp MD  Critical Care - Surgery  [facility]

## 2024-01-18 NOTE — PROGRESS NOTES
CRRT new start , /. UF initially set at 250. B/P 130/69, pulse 68, o2 sat 100% on 2 liters nasal cannula.

## 2024-01-18 NOTE — PROGRESS NOTES
[facility]  Nephrology  Progress Note    Patient Name: Lily Green  MRN: [id number]  Admission Date: 1/2/2024  Hospital Length of Stay: 16 days  Attending Provider: Tan Thomson MD   Primary Care Physician: Pavel Calixto MD  Principal Problem:Malignant carcinoid tumor of bronchus    Subjective:      Interval History: SLED overnight. Net -2.5L. On 2L NC. BP 100s-130s/50-60s. S/p Micra and R. Chest tube yesterday. CXR improved. Plan for left chest tube placement today.     Review of patient's allergies indicates:   Allergen Reactions    Crestor [rosuvastatin] Swelling    Gluten protein      Current Facility-Administered Medications   Medication Frequency    0.9%  NaCl infusion PRN    0.9%  NaCl infusion Continuous    albuterol sulfate nebulizer solution 2.5 mg Q4H PRN    bisacodyL suppository 10 mg Daily PRN    dextrose 10% bolus 125 mL 125 mL PRN    dextrose 10% bolus 250 mL 250 mL PRN    droNABinol capsule 5 mg BID    epoetin noble injection 4,000 Units Every Mon, Wed, Fri    glucagon (human recombinant) injection 1 mg PRN    glucose chewable tablet 16 g PRN    glucose chewable tablet 24 g PRN    heparin (porcine) injection 1,000 Units PRN    hydrALAZINE injection 10 mg Q6H PRN    insulin aspart U-100 pen 0-10 Units Q4H PRN    [START ON 1/19/2024] insulin detemir U-100 (Levemir) pen 8 Units Daily    LIDOcaine 5 % patch 1 patch Q24H    meropenem (MERREM) 500 mg in sodium chloride 0.9 % 100 mL IVPB (MB+) Q12H    ondansetron injection 8 mg Q8H PRN    oxybutynin tablet 5 mg TID    pantoprazole EC tablet 40 mg Daily    polyethylene glycol packet 17 g Daily    senna-docusate 8.6-50 mg per tablet 1 tablet Daily    sodium chloride 0.9% flush 10 mL PRN       Objective:     Vital Signs (Most Recent):  Temp: 97.5 °F (36.4 °C) (01/18/24 1100)  Pulse: 83 (01/18/24 1115)  Resp: (!) 8 (01/18/24 1115)  BP: 132/66 (01/18/24 1100)  SpO2: 100 % (01/18/24 1115) Vital Signs (24h Range):  Temp:   [96.3 °F (35.7 °C)-98 °F (36.7 °C)] 97.5 °F (36.4 °C)  Pulse:  [62-89] 83  Resp:  [8-42] 8  SpO2:  [91 %-100 %] 100 %  BP: ()/(51-81) 132/66     Weight: 77 kg (169 lb 12.1 oz) (01/17/24 0615)  Body mass index is 28.25 kg/m².  Body surface area is 1.88 meters squared.    I/O last 3 completed shifts:  In: 1648.6 [I.V.:1354.3; IV Piggyback:294.4]  Out: 4081 [Other:2621; Chest Tube:1460]     Physical Exam  Vitals and nursing note reviewed.   Constitutional:       Appearance: She is well-developed. She is ill-appearing.   HENT:      Head: Normocephalic and atraumatic.      Right Ear: External ear normal.      Left Ear: External ear normal.   Eyes:      General: No scleral icterus.        Right eye: No discharge.         Left eye: No discharge.      Conjunctiva/sclera: Conjunctivae normal.   Cardiovascular:      Rate and Rhythm: Normal rate and regular rhythm.      Heart sounds: Normal heart sounds. No murmur heard.     No friction rub. No gallop.   Pulmonary:      Effort: Pulmonary effort is normal. No respiratory distress.      Breath sounds: No stridor. Examination of the right-lower field reveals decreased breath sounds. Decreased breath sounds present. No wheezing or rales.   Abdominal:      General: Bowel sounds are normal.   Musculoskeletal:         General: No tenderness.      Right lower leg: Edema present.      Left lower leg: Edema present.   Skin:     General: Skin is warm and dry.   Neurological:      Mental Status: She is alert and oriented to person, place, and time.          Significant Labs:  CBC:   Recent Labs   Lab 01/18/24  0348   WBC 21.82*   RBC 2.45*   HGB 7.2*   HCT 22.5*      MCV 92   MCH 29.4   MCHC 32.0     CMP:   Recent Labs   Lab 01/18/24  0348   GLU 99  99  99   CALCIUM 8.0*  8.0*  8.0*   ALBUMIN 2.0*  2.0*  2.0*   PROT 4.8*     136  138   K 4.9  4.9  5.0   CO2 23  23  21*     108  109   BUN 9  9  10   CREATININE 1.0  1.0  1.0   ALKPHOS 136*   ALT  8*   AST 17   BILITOT 0.8     All labs within the past 24 hours have been reviewed.     Assessment/Plan:     Renal/  ESRD (end stage renal disease)  S/p R thoracotomy due to RML carcinoid tumor. On HD ~ 1 year. Last HD prior to presentation 1/1/24. Pt has NELY AVF placed on 10/23 that has not been cleared by vascular. Currently using RIJ TDC.     During her admission: stepped up to ICU for Afib with RVR. Was started on BB, amio and digoxin. However 1/12/24, had multiple episodes of eulogio with few asystole requiring chest compressions. Digoxin level elevated and dig-meaghan was given with resulting decrease in levels.     Nephrology History  iHD Schedule: TTS   Unit/MD: [facility]/ Dr. Vyas  Duration: 3.5 hours   EDW: 58 kg   Access: RIJ TDC  Residual Renal Function: minimal       Volume status: net positive this admission (records indicate 5 liters)    Assessment:     -No further plans for RRT today, plan for HD tomorrow 1/19 if continues to be HD stable.    -Keep MAP > 65  -Keep hemoglobin > 7  -Strict ins and outs  -Avoid nephrotoxic agents if possible and renally dose medications  -Avoid drastic hemodynamic changes if possible      Oncology  * Malignant carcinoid tumor of bronchus  -management per primary         Thank you for your consult. I will follow-up with patient. Please contact us if you have any additional questions.    Christy Gaytan DNP  Nephrology  [facility]

## 2024-01-18 NOTE — ADDENDUM NOTE
Addendum  created 01/18/24 1608 by Pavel Arredondo, CRNA    Clinical Note Signed, Flowsheet accepted, Intraprocedure Event edited

## 2024-01-18 NOTE — NURSING
SICU PLAN OF CARE NOTE    Dx: Malignant carcinoid tumor of bronchus    Shift Events: VSS, pt went to IR today for left sided chest tube placement. OOBTC. Plan for HD tomorrow     Neuro: AAO x4, Follows Commands, and Moves All Extremities    Cardiac: NSR 80s-90s    Respiratory: Room Air    GI: Cardiac Diet    : Anuric 0   cc/shift    Drains: Chest Tube, total output 845 cc /  shift     Labs/Accuchecks: daily labs, accuchecks q4    Skin: No skin breakdown noted during shift, pt turned and repositioned throughout shift. Pillows in use, foams in place, SCDs on. Bed plugged in,wheels locked,call light in reach. Pt and spouse updated on plan of care and verbalize understanding.

## 2024-01-18 NOTE — ANESTHESIA POSTPROCEDURE EVALUATION
Anesthesia Post Evaluation    Patient: Lily Green    Procedure(s) Performed: * No procedures listed *    Final Anesthesia Type: general      Patient location during evaluation: ICU  Patient participation: Yes- Able to Participate  Level of consciousness: awake and alert  Post-procedure vital signs: reviewed and stable  Pain management: adequate  Airway patency: patent    PONV status at discharge: No PONV  Anesthetic complications: no      Cardiovascular status: blood pressure returned to baseline  Respiratory status: unassisted, spontaneous ventilation and nasal cannula  Hydration status: euvolemic                Vitals Value Taken Time   /61 01/18/24 0930   Temp 36.7 °C (98 °F) 01/18/24 0700   Pulse 87 01/18/24 1037   Resp 37 01/18/24 1037   SpO2 100 % 01/18/24 1035   Vitals shown include unvalidated device data.      No case tracking events are documented in the log.      Pain/Meredith Score: Meredith Score: 8 (1/17/2024  7:30 PM)

## 2024-01-18 NOTE — SUBJECTIVE & OBJECTIVE
Interval History/Significant Events:   CT placement yesterday, will go with IR today for L side.  Stable overnight  Pacemaker backed up at 60    POD 2 from micra placement  POD 16 from thoracotomy      Objective:     Vital Signs (Most Recent):  Temp: 97.5 °F (36.4 °C) (01/18/24 1100)  Pulse: 83 (01/18/24 1100)  Resp: 15 (01/18/24 1100)  BP: 131/61 (01/18/24 0930)  SpO2: 98 % (01/18/24 1030) Vital Signs (24h Range):  Temp:  [96.3 °F (35.7 °C)-98 °F (36.7 °C)] 97.5 °F (36.4 °C)  Pulse:  [62-89] 83  Resp:  [10-42] 15  SpO2:  [91 %-100 %] 98 %  BP: ()/(51-81) 131/61     Weight: 77 kg (169 lb 12.1 oz)  Body mass index is 28.25 kg/m².      Intake/Output Summary (Last 24 hours) at 1/18/2024 1124  Last data filed at 1/18/2024 1101  Gross per 24 hour   Intake 2198.94 ml   Output 5404 ml   Net -3205.06 ml          Physical Exam  Vitals and nursing note reviewed.   Constitutional:       General: She is not in acute distress.     Appearance: Normal appearance. She is normal weight. She is ill-appearing. She is not toxic-appearing.   HENT:      Head: Normocephalic and atraumatic.      Right Ear: External ear normal.      Left Ear: External ear normal.      Nose: Nose normal.      Comments: Nasal cannula in place  Eyes:      General: No scleral icterus.        Right eye: No discharge.         Left eye: No discharge.      Extraocular Movements: Extraocular movements intact.      Conjunctiva/sclera: Conjunctivae normal.   Cardiovascular:      Rate and Rhythm: Normal rate and regular rhythm.   Pulmonary:      Effort: Pulmonary effort is normal. No respiratory distress.      Comments: R chest tube in place  Abdominal:      General: Abdomen is flat. There is no distension.      Palpations: Abdomen is soft. There is no mass.      Tenderness: There is no abdominal tenderness. There is no guarding.      Hernia: No hernia is present.   Genitourinary:     Comments: Groin incisions covered with gauze and Tegaderm bilaterally, no  breakthrough bleeding or hematomas noted  Musculoskeletal:         General: Normal range of motion.      Cervical back: Normal range of motion and neck supple.   Skin:     General: Skin is warm and dry.   Neurological:      General: No focal deficit present.      Mental Status: She is alert and oriented to person, place, and time. Mental status is at baseline.   Psychiatric:         Mood and Affect: Mood normal.         Behavior: Behavior normal.         Thought Content: Thought content normal.            Vents:  Oxygen Concentration (%): 24 (01/15/24 0200)    Lines/Drains/Airways       Central Venous Catheter Line  Duration                  Hemodialysis Catheter 01/25/23 1501 right internal jugular 357 days              Drain  Duration                  Chest Tube 01/17/24 1504 Right Pleural 10 Fr. <1 day              Peripheral Intravenous Line  Duration                  Hemodialysis AV Fistula 09/01/23 0800 Left forearm 139 days         Peripheral IV - Single Lumen 01/15/24 1230 20 G;1 3/4 in Anterior;Proximal;Right Forearm 2 days         Peripheral IV - Single Lumen 01/16/24 1300 20 G Anterior;Proximal;Right Forearm 1 day                    Significant Labs:    CBC/Anemia Profile:  Recent Labs   Lab 01/17/24  0316 01/18/24  0348   WBC 23.53* 21.82*   HGB 7.6* 7.2*   HCT 23.2* 22.5*    169   MCV 88 92   RDW 19.9* 20.7*        Chemistries:  Recent Labs   Lab 01/17/24  0316 01/17/24  1314 01/17/24  2218 01/18/24  0348   * 134* 135*  135* 136  136  138   K 4.8 4.5 4.4  4.4 4.9  4.9  5.0    104 106  106 108  108  109   CO2 22* 21* 23  23 23  23  21*   BUN 25* 31* 19  19 9  9  10   CREATININE 1.9* 2.5* 1.4  1.4 1.0  1.0  1.0   CALCIUM 8.4* 8.7 8.0*  8.0* 8.0*  8.0*  8.0*   ALBUMIN 2.4* 2.3* 2.0*  2.0* 2.0*  2.0*  2.0*   PROT 5.8*  --   --  4.8*   BILITOT 0.7  --   --  0.8   ALKPHOS 158*  --   --  136*   ALT 7*  --   --  8*   AST 20  --   --  17   MG 2.4  --  2.1  2.1 1.8   1.8   PHOS 5.6* 5.2* 3.2  3.2 2.4*  2.4*  2.4*       All pertinent labs within the past 24 hours have been reviewed.    Significant Imaging:  I have reviewed all pertinent imaging results/findings within the past 24 hours.

## 2024-01-18 NOTE — CONSULTS
Chest Tube Placement Consult Note  Interventional Radiology    Consult Requested By: Brooklyn Sapp MD  Reason for Consult: left sided chest tube    SUBJECTIVE:     Chief Complaint:  L pleural effusion    History of Present Illness:  Lily Green is a 73 y.o. 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 Post op course notable for persistent afib rvr, SBO (now resolved), tachy-eulogio syndrome and asystolic arrest x3 s/p emergent TVP placement on 1/12 and s/p PPM placement on 1/16/24, bilateral pleural effusions s/p R chest tube placement into loculated effusion on 1/17/24.     Interventional Radiology has been consulted for L sided chest tube placement. Pt with persistent supplemental O2 needs and intermittent tachycardia prompting CT chest on 1/13/24 which revealed moderate-sized bilateral pleural effusions, increased from 01/08/2024 with the right sided effusion appearing multiloculated. Pt underwent chest tube placement into R lower lung base loculation on 1/17/24 with drainage of 1460 serosanguinous fluid. Pleural fluid studies currently pending. The pt is not in respiratory distress, satting well on 2 L O2 via NC. The pt is hemodynamically stable.     Scheduled Meds   droNABinol  5 mg Oral BID    epoetin noble (PROCRIT) injection  4,000 Units Intravenous Every Mon, Wed, Fri    [START ON 1/19/2024] insulin detemir U-100  8 Units Subcutaneous Daily    LIDOcaine  1 patch Transdermal Q24H    meropenem (MERREM) IVPB  500 mg Intravenous Q12H    oxybutynin  5 mg Oral TID    pantoprazole  40 mg Oral Daily    polyethylene glycol  17 g Oral Daily    senna-docusate 8.6-50 mg  1 tablet Oral Daily     Continuous Infusions:   sodium chloride 0.9% 10 mL/hr at 01/16/24 1200     PRN Meds:sodium chloride 0.9%, albuterol sulfate, bisacodyL, dextrose 10%, dextrose 10%, glucagon (human  recombinant), glucose, glucose, heparin (porcine), hydrALAZINE, insulin aspart U-100, ondansetron, sodium chloride 0.9%    Review of patient's allergies indicates:   Allergen Reactions    Crestor [rosuvastatin] Swelling    Gluten protein        Past Medical History:   Diagnosis Date    Anxiety     Celiac disease 2018    Celiac disease     Depression     Diabetes mellitus     Family history of breast cancer in mother      at age 68    Hyperlipidemia     Hypertension     Meniere disease     Meniere's disease, unspecified ear     Menopause     Peptic ulcer     Reflux esophagitis     Urinary tract infection     Vaginal infection     Vaginal Pap smear 2014    Pap/Hpv Negative      Past Surgical History:   Procedure Laterality Date    APPENDECTOMY      BREAST SURGERY      Tags    CARPAL TUNNEL RELEASE Bilateral 2017    ,     CLOSURE, COLOSTOMY Left 2023    Procedure: CLOSURE, COLOSTOMY;  Surgeon: Manuel Javier MD;  Location: [facility] OR;  Service: General;  Laterality: Left;    COLONOSCOPY  2018    Normal  ( Juan A)     COLOSTOMY Right 2023    Procedure: CREATION, COLOSTOMY;  Surgeon: Manuel Javier MD;  Location: [facility] OR;  Service: General;  Laterality: Right;    DILATION AND CURETTAGE OF UTERUS  1986    DUPUYTREN CONTRACTURE RELEASE Bilateral 2017    HYSTERECTOMY      BEAU w/ appy, no BSO     IMPLANTATION OF LEADLESS PACEMAKER N/A 2024    Procedure: INSERTION, CARDIAC PACEMAKER, LEADLESS;  Surgeon: LENIN Frances MD;  Location: [facility] EP LAB;  Service: Cardiology;  Laterality: N/A;  SB, LEADLESS PPM (MICRA), MDT, ANES, EH, CVICU [id number]    INJECTION OF NEUROLYTIC AGENT AROUND LUMBAR SYMPATHETIC NERVE N/A 2022    Procedure: BLOCK, LUMBAR SYMPATHETIC;  Surgeon: Souleymane Rizo Jr., MD;  Location: [facility] PAIN MGT;  Service: Pain Management;  Laterality: N/A;  no pacemaker   pt is diabetic     INJECTION OF NEUROLYTIC AGENT AROUND LUMBAR SYMPATHETIC NERVE N/A  8/25/2022    Procedure: BLOCK, LUMBAR SYMPATHETIC;  Surgeon: Souleymane Rizo Jr., MD;  Location: [facility] PAIN MGT;  Service: Pain Management;  Laterality: N/A;  diabetic     INSERTION OF TUNNELED CENTRAL VENOUS HEMODIALYSIS CATHETER Right 1/25/2023    Procedure: INSERTION, CATHETER, HEMODIALYSIS, DUAL LUMEN;  Surgeon: Manuel Javier MD;  Location: [facility] OR;  Service: General;  Laterality: Right;    INSERTION, CATHETER, TUNNELED Left 1/28/2023    Procedure: Insertion,catheter,tunneled;  Surgeon: Watson Fontenot MD;  Location: [facility] OR;  Service: General;  Laterality: Left;    LAPAROTOMY, EXPLORATORY N/A 1/14/2023    Procedure: LAPAROTOMY, EXPLORATORY;  Surgeon: Manuel Javier MD;  Location: [facility] OR;  Service: General;  Laterality: N/A;    LAPAROTOMY, EXPLORATORY N/A 1/16/2023    Procedure: LAPAROTOMY, EXPLORATORY;  Surgeon: Manuel Javier MD;  Location: [facility] OR;  Service: General;  Laterality: N/A;    LYMPHADENECTOMY Right 1/2/2024    Procedure: LYMPHADENECTOMY;  Surgeon: Tan Thomson MD;  Location: [facility] OR 2ND FLR;  Service: Cardiothoracic;  Laterality: Right;    PACEMAKER, TEMPORARY, TRANSVENOUS, PEDIATRIC Right 1/12/2024    Procedure: Pacemaker, Temporary, Transvenous;  Surgeon: Todd Carlisle MD;  Location: [facility] CATH LAB;  Service: Cardiology;  Laterality: Right;    REMOVAL OF CATHETER Right 1/28/2023    Procedure: REMOVAL, CATHETER;  Surgeon: Watson Fontenot MD;  Location: [facility] OR;  Service: General;  Laterality: Right;    REMOVAL, TUNNELED CATH Right 1/25/2023    Procedure: REMOVAL, TUNNELED CATH;  Surgeon: Manuel Javier MD;  Location: [facility] OR;  Service: General;  Laterality: Right;    ROBOTIC BRONCHOSCOPY N/A 10/20/2023    Procedure: ROBOTIC BRONCHOSCOPY;  Surgeon: Mayda Monzon MD;  Location: [facility] OR 2ND FLR;  Service: Pulmonary;  Laterality: N/A;    SHOULDER SURGERY  2009 & 2010    right rotator cuff    THORACOTOMY Right 1/2/2024    Procedure: THORACOTOMY;  Surgeon: Alix  Tan CORNEJO MD;  Location: [address];  Service: Cardiothoracic;  Laterality: Right;    THORACOTOMY, WITH INITIAL THERAPEUTIC WEDGE RESECTION OF LUNG Right 1/2/2024    Procedure: THORACOTOMY, WITH INITIAL THERAPEUTIC WEDGE RESECTION OF LUNG;  Surgeon: Tan Thomson MD;  Location: [facility] OR [address] [place] [address];  Service: Cardiothoracic;  Laterality: Right;    TONSILLECTOMY      UPPER GASTROINTESTINAL ENDOSCOPY  04/2018     Family History   Problem Relation Age of Onset    Vision loss Father     Arthritis Father     Breast cancer Mother     Cancer Mother     Vision loss Mother     Hyperlipidemia Sister     Breast cancer Paternal Aunt     Cancer Paternal Aunt     Diabetes Paternal Grandfather     Vision loss Sister     Kidney disease Neg Hx      Social History     Tobacco Use    Smoking status: Never    Smokeless tobacco: Never   Substance Use Topics    Alcohol use: No    Drug use: Never       OBJECTIVE:     Vital Signs (Most Recent)  Temp: 97.5 °F (36.4 °C) (01/18/24 1100)  Pulse: 83 (01/18/24 1115)  Resp: (!) 8 (01/18/24 1115)  BP: 132/66 (01/18/24 1100)  SpO2: 100 % (01/18/24 1115)    Physical Exam:  Physical Exam  Vitals and nursing note reviewed.   Constitutional:       General: She is not in acute distress.     Appearance: She is ill-appearing.   HENT:      Head: Normocephalic and atraumatic.      Right Ear: External ear normal.      Left Ear: External ear normal.      Nose:      Comments: NC in place  Eyes:      Extraocular Movements: Extraocular movements intact.      Conjunctiva/sclera: Conjunctivae normal.      Pupils: Pupils are equal, round, and reactive to light.   Pulmonary:      Effort: Pulmonary effort is normal. No respiratory distress.      Comments: Increased WOB  R chest tube in place  Abdominal:      General: Abdomen is flat. There is no distension.   Skin:     General: Skin is warm and dry.      Coloration: Skin is not jaundiced.   Neurological:      General: No focal deficit present.      Mental  Status: She is alert and oriented to person, place, and time.   Psychiatric:         Mood and Affect: Mood normal.         Behavior: Behavior normal.         Thought Content: Thought content normal.         Judgment: Judgment normal.         Laboratory  I have reviewed all pertinent lab results within the past 24 hours.  CBC:   Recent Labs   Lab 01/18/24 0348   WBC 21.82*   RBC 2.45*   HGB 7.2*   HCT 22.5*      MCV 92   MCH 29.4   MCHC 32.0     BMP:   Recent Labs   Lab 01/18/24  0348   GLU 99  99  99     136  138   K 4.9  4.9  5.0     108  109   CO2 23  23  21*   BUN 9  9  10   CREATININE 1.0  1.0  1.0   CALCIUM 8.0*  8.0*  8.0*   MG 1.8  1.8     CMP:   Recent Labs   Lab 01/18/24 0348   GLU 99  99  99   CALCIUM 8.0*  8.0*  8.0*   ALBUMIN 2.0*  2.0*  2.0*   PROT 4.8*     136  138   K 4.9  4.9  5.0   CO2 23  23  21*     108  109   BUN 9  9  10   CREATININE 1.0  1.0  1.0   ALKPHOS 136*   ALT 8*   AST 17   BILITOT 0.8     LFTs:   Recent Labs   Lab 01/18/24 0348   ALT 8*   AST 17   ALKPHOS 136*   BILITOT 0.8   PROT 4.8*   ALBUMIN 2.0*  2.0*  2.0*     Coagulation:   Recent Labs   Lab 01/12/24  0631 01/12/24  1316 01/18/24 0348   LABPROT 12.7*  --   --    INR 1.2  --   --    APTT 62.5*   < > 22.2    < > = values in this interval not displayed.     Microbiology Results (last 7 days)       Procedure Component Value Units Date/Time    Culture, body fluid - Bactec [[id number]]     Order Status: No result Specimen: Body Fluid from Thoracentesis Fluid     Gram stain [[id number]]     Order Status: No result Specimen: Body Fluid from Pleural Fluid     AFB culture [[id number]]     Order Status: No result Specimen: Body Fluid from Pleural Fluid     AFB stain [[id number]]     Order Status: No result Specimen: Body Fluid from Pleural Fluid     KOH prep [[id number]]     Order Status: No result Specimen: Body Fluid from Pleural Fluid     Fungus culture [[id number]]      Order Status: No result Specimen: Body Fluid from Pleural Fluid     Culture, body fluid - Bactec [[id number]] Collected: 01/17/24 1555    Order Status: Completed Specimen: Body Fluid from Pleural Fluid Updated: 01/18/24 0115     Body Fluid Culture, Sterile No Growth to date    AFB stain [[id number]] Collected: 01/17/24 1555    Order Status: Completed Specimen: Body Fluid from Pleural Fluid Updated: 01/17/24 2012     Direct Acid Fast No acid fast bacilli seen.    Gram stain [[id number]] Collected: 01/17/24 1555    Order Status: Completed Specimen: Body Fluid from Pleural Fluid Updated: 01/17/24 1848     Gram Stain Result No WBC's      No organisms seen    JADEN prep [[id number]] Collected: 01/17/24 1555    Order Status: Completed Specimen: Body Fluid from Pleural Fluid Updated: 01/17/24 1836     KOH Prep No yeast or fungal elements seen    AFB culture [[id number]] Collected: 01/17/24 1555    Order Status: Sent Specimen: Body Fluid from Pleural Fluid Updated: 01/17/24 1643    Fungus culture [[id number]] Collected: 01/17/24 1555    Order Status: Sent Specimen: Body Fluid from Pleural Fluid Updated: 01/17/24 1639    Blood culture [[id number]] Collected: 01/12/24 0411    Order Status: Completed Specimen: Blood from Peripheral, Foot, Left Updated: 01/17/24 0812     Blood Culture, Routine No growth after 5 days.    Culture, Respiratory with Gram Stain [[id number]]  (Abnormal)  (Susceptibility) Collected: 01/11/24 2030    Order Status: Completed Specimen: Sputum, Induced Updated: 01/15/24 0925     Respiratory Culture No S aureus isolated.      KLEBSIELLA PNEUMONIAE  Many        ESCHERICHIA COLI  Many  Normal respiratory ramu also present       Gram Stain (Respiratory) <10 epithelial cells per low power field.     Gram Stain (Respiratory) Moderate WBC's     Gram Stain (Respiratory) Many Gram negative rods     Gram Stain (Respiratory) Rare Gram positive cocci    Blood culture [[id number]]  (Abnormal) Collected: 01/11/24  1601    Order Status: Completed Specimen: Blood from Peripheral, Antecubital, Right Updated: 01/14/24 0812     Blood Culture, Routine Gram stain shahriar bottle: Gram positive cocci in clusters resembling Staph      Results called to and read back by: Gena UNGER RN 01/12/2024  18:43      COAGULASE-NEGATIVE STAPHYLOCOCCUS SPECIES  Organism is a probable contaminant      Culture, MRSA [[id number]] Collected: 01/11/24 1836    Order Status: Completed Specimen: MRSA source from Nares, Right Updated: 01/13/24 0844     MRSA Surveillance Screen No MRSA isolated    Rapid Organism ID by PCR (from Blood culture) [[id number]]  (Abnormal) Collected: 01/11/24 1601    Order Status: Completed Updated: 01/12/24 1956     Enterococcus faecalis Not Detected     Enterococcus faecium Not Detected     Listeria monocytogenes Not Detected     Staphylococcus spp. See species for ID     Staphylococcus aureus Not Detected     Staphylococcus epidermidis Detected     Staphylococcus lugdunensis Not Detected     Streptococcus species Not Detected     Streptococcus agalactiae Not Detected     Streptococcus pneumoniae Not Detected     Streptococcus pyogenes Not Detected     Acinetobacter calcoaceticus/baumannii complex Not Detected     Bacteroides fragilis Not Detected     Enterobacterales Not Detected     Enterobacter cloacae complex Not Detected     Escherichia coli Not Detected     Klebsiella aerogenes Not Detected     Klebsiella oxytoca Not Detected     Klebsiella pneumoniae group Not Detected     Proteus Not Detected     Salmonella sp Not Detected     Serratia marcescens Not Detected     Haemophilus influenzae Not Detected     Neisseria meningtidis Not Detected     Pseudomonas aeruginosa Not Detected     Stenotrophomonas maltophilia Not Detected     Candida albicans Not Detected     Candida auris Not Detected     Candida glabrata Not Detected     Candida krusei Not Detected     Candida parapsilosis Not Detected     Candida tropicalis Not Detected      Cryptococcus neoformans/gattii Not Detected     CTX-M (ESBL ) Test Not Applicable     IMP (Carbapenem resistant) Test Not Applicable     KPC resistance gene (Carbapenem resistant) Test Not Applicable     mcr-1  Test Not Applicable     mec A/C  Not Detected     mec A/C and MREJ (MRSA) gene Test Not Applicable     NDM (Carbapenem resistant) Test Not Applicable     OXA-48-like (Carbapenem resistant) Test Not Applicable     van A/B (VRE gene) Test Not Applicable     VIM (Carbapenem resistant) Test Not Applicable            ASA/Mallampati  ASA: 2  Mallampati: 2    Imaging:  Recent imaging studies including CT chest on 1/13/24 which was independently reviewed by Lalo Angel MD.     EXAMINATION:  CT CHEST WITH CONTRAST     CLINICAL HISTORY:  pleural effusion, right lung evaluation;     TECHNIQUE:  Low dose axial images, sagittal and coronal reformations were obtained from the thoracic inlet to the lung bases following the IV administration of 75 mL of Omnipaque 350.     COMPARISON:  AP chest x-ray 01/13/2024.     CT abdomen pelvis 01/08/2024     Noncontrast chest CT 10/18/2023     FINDINGS:  Artifacts related to motion and/or beam hardening degrade numerous images.     Thyroid: Diminutive, possibly on the basis of atrophy.     Airways: The trachea and mainstem bronchi are patent.  There are some occluded bronchi in both lower pulmonary lobes, possibly on the basis of mucous plugging.  In the right middle lobe, there is occlusion of the medial segment bronchus, possibly postoperative.     Esophagus: Gas-filled, mildly distended upper thoracic esophagus.  The previously present orogastric or nasogastric tube has been removed.  This limits delineation of the distal esophagus.  There remains some soft tissue fullness in the region of the distal esophagus thoracic esophageal recess.  Gas projecting within the soft tissue fullness could be related to the esophageal lumen or to incomplete resolution of the previously  described azygoesophageal recess gas-containing collection from the 01/08/2024 abdominopelvic CT report.     Mediastinum/barbie: Mediastinal surgical clips.  There is at least 1 discretely visualized, frankly enlarged mediastinal lymph node, precarinal, 2.3 x 1.7 cm.  There is poorly defined subcarinal fullness, also suspicious for lymphadenopathy.     There remain some mediastinal lymph node calcifications, likely granulomatous.     There is increased attenuation of portions of the mediastinal fat, possibly related to mediastinal edema; other infiltrative mediastinal pathology is not excluded.     Thoracic aorta: Normal course and caliber.  Mild atherosclerotic changes.  No acute CT abnormality.     Pulmonary arteries: Although this scan was not obtained with CT technique, the central portions of the pulmonary arterial tree are reasonably well-enhanced and demonstrate no filling defects suspicious for large central pulmonary thromboemboli.  Abrupt truncation of the right middle lobe medial segment pulmonary artery branch, possibly postoperative.     Left brachiocephalic, subclavian, and proximal axillary venous stent.  On two-dimensional images, the stent appears grossly patent; on the 3D MIP images, a nonocclusive intraluminal filling defect is suggested but is favored to represent artifact of 3D processing.     Right jugular approach central venous catheter, terminating in the region of the superior cavoatrial junction.  Within the SVC, there is very little contrast enhancement seen about the course of the right jugular approach central venous catheter.  In this patient is IV contrast was injected via the right upper extremity, there is opacification of several collateral venous channels in the chest, including bright enhancement of the azygous arch that likely represents anterograde flow of contrast from the peripheral veins through the azygous into the SVC.     Heart/pericardium: Normal sized heart chambers.  A  partially visualized trans venous cardiac pacing lead, likely placed from a femoral approach, terminates in the region the right ventricular apex.  Circumferential moderate sized pericardial effusion up to approximately 16 mm in depth.  The artifacts likely contribute to the somewhat broad range of attenuation measurements of this fluid, 22-45 HU.     Lungs: Bilateral lower lobe atelectasis.  Atelectasis or partial atelectasis of the right middle lobe medial segment, with suspected underlying tumor.  Punctate calcification, likely a granuloma, in the left lower lobe.     Pleura: Moderate quantity of pleural fluid bilaterally, some of it possibly loculated (especially on the right, where there are several loculations suggested within the pleural fissures and in the apical region).  Underlying low-attenuation pleural masses are not excluded.  No pneumothorax.     Upper abdomen: Radiopaque material layers dependently within the lumen of the partially visualized stomach, possibly representing oral contrast.     The visualized portion of the stomach is only partially distended, limiting CT assessment for abnormal mural mucosal thickening (as could be seen with gastritis or other gastric pathology.).  There is some reflux of contrast from the right atrium into the IVC and hepatic veins.  Visualized upper portions of the liver and spleen demonstrate no discrete masses.     Body wall: Incompletely visualized asymmetrically positioned breasts; this limits CT assessment for asymmetric breast densities.  No discrete axillary or supraclavicular lymphadenopathy identified.     Musculoskeletal: Scattered degenerative changes.  Diffuse osteopenia.  No acute fracture deformity.  Some of the collateral venous flow involves spinal epidural veins.     Impression:     Moderate-sized bilateral pleural effusions, increased from 01/08/2024; likely multiply-loculated on the right.  Underlying pleural infection or pleural neoplasm not  excluded.  Consider diagnostic thoracentesis, especially on the right.     Bilateral lower lobe atelectasis.     Partial atelectasis of the right middle lobe, possibly postoperative.     Other underlying pathology not excluded in the atelectatic portions of the lungs.  Correlate clinically and with follow-up chest CT within approximately 3-6 months.     Circumferential moderate-sized pericardial effusion, approximately 1.6 cm in depth; substantially increased from 01/08/2024.  Allowing for artifacts, the pericardial fluid appears to be hyperattenuating to water.  DDX includes malignant pleural effusion, infected pericardial fluid, or subacute blood products.  Correlate clinically, including with echocardiography.     Mediastinal surgical clips.     Mediastinal lymphadenopathy, new since 10/18/2023, reactive versus metastatic.     Previously described azygoesophageal recess collection is poorly delineated but appears substantially decreased in size since 2024.  Its residua is likely compressing or otherwise involving the thoracic esophagus; above this level, there is mild gaseous distention of the upper thoracic esophagus.  Correlate clinically, including for difficulty swallowing.     Probable SVC stenosis.     Additional observations as detailed in the body of the report.     This report was flagged in Epic as abnormal.        Electronically signed by: Samuel Bustamante  Date:                                            01/13/2024  Time:                                           21:48     ASSESSMENT/PLAN:     Assessment:  73 y.o. 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 The procedure was discussed in great detail with the patient including thorough explanations of the potential risks and  benefits of chest tube placement. Risks include infection, hemorrhage, damage to surrounding structures such as the lung, catheter malfunction, inability to remove catheter, pneumothorax and catheter dislodgment. The patient is a candidate for CT guided left chest tube placement under MAC sedation. Plan discussed with ordering physician.The pt verbalized understanding of the plan and would like to proceed.    Plan:  Will proceed with CT guided left chest tube placement under MAC sedation on 1/18/24 pending anesthesia availability.   Primary team to place orders for labs/cultures to be drawn on the pleural fluid drained during the procedure   Please keep pt NPO   Anticoagulation history reviewed.   Coagulation labs reviewed.  Thank you for the consult. Please contact with questions via [employer] secure chat or [employer]    Mackenzie Campbell PA-C  Interventional Radiology  [employer]: [phone]

## 2024-01-18 NOTE — PLAN OF CARE
Procedure complete. Pt tolerated well. 10 fr chest tube placed. Site CDI. Pt transferred to [facility] and report to be given bedside.

## 2024-01-18 NOTE — TRANSFER OF CARE
"Anesthesia Transfer of Care Note    Patient: Lily Green    Procedure(s) Performed: * No procedures listed *    Patient location: ICU    Anesthesia Type: general    Transport from OR: Transported from OR on 100% O2 by closed face mask with adequate spontaneous ventilation. Continuous ECG monitoring in transport. Continuous SpO2 monitoring in transport    Post pain: adequate analgesia    Post assessment: no apparent anesthetic complications and tolerated procedure well    Post vital signs: stable    Level of consciousness: awake and alert    Nausea/Vomiting: no nausea/vomiting    Complications: none    Transfer of care protocol was followed      Last vitals: Visit Vitals  /66   Pulse 86   Temp 36.4 °C (97.5 °F) (Oral)   Resp (!) 5   Ht 5' 5" (1.651 m)   Wt 77 kg (169 lb 12.1 oz)   LMP  (LMP Unknown)   SpO2 100%   Breastfeeding No   BMI 28.25 kg/m²     "

## 2024-01-18 NOTE — PLAN OF CARE
Pt arrived to IR  for Left chest tube placement. Pt oriented to unit and staff, Pt safely transferred from stretcher to procedural table. Fall risk reviewed and comfort measures utilized with interventions. Safety strap applied, position pillows to minimize pressure points. Blankets applied. Pt prepped and draped utilizing standard sterile technique. Patient placed on continuous monitoring, as required by sedation policy. Timeouts implemented utilizing standard universal time-out per department and facility policy. CRNA to remain at bedside with continuous monitoring. Pt resting comfortably. Denies pain/discomfort. Will continue to monitor. See flow sheets for monitoring, medication administration, and updates. patient verbalizes understanding.

## 2024-01-18 NOTE — ANESTHESIA POSTPROCEDURE EVALUATION
Anesthesia Post Evaluation    Patient: Lily Green    Procedure(s) Performed: * No procedures listed *    Final Anesthesia Type: general      Patient location during evaluation: ICU  Patient participation: Yes- Able to Participate  Level of consciousness: awake  Post-procedure vital signs: reviewed and stable  Pain management: adequate  Airway patency: patent    PONV status at discharge: No PONV  Anesthetic complications: no      Cardiovascular status: hemodynamically stable  Respiratory status: spontaneous ventilation  Follow-up not needed.              Vitals Value Taken Time   /65 01/18/24 1600   Temp 98.0 01/18/24 1601   Pulse 85 01/18/24 1601   Resp 17 01/18/24 1601   SpO2 100 % 01/18/24 1601   Vitals shown include unvalidated device data.      No case tracking events are documented in the log.      Pain/Meredith Score: Meredith Score: 8 (1/17/2024  7:30 PM)

## 2024-01-18 NOTE — PROGRESS NOTES
[facility]  Cardiac Electrophysiology  Progress Note    Admission Date: 1/2/2024  Code Status: Full Code   Attending Physician: Tan Thomson MD   Expected Discharge Date: 1/19/2024  Principal Problem:Malignant carcinoid tumor of bronchus    Subjective:     Interval History:  Tele with sinus rhythm, bradycardic episodes to backup pacing rate. Micra set to VVI 60 while possibly getting further procedures.     Review of Systems   Constitutional: Negative for diaphoresis and fever.   Cardiovascular:  Negative for chest pain, dyspnea on exertion, leg swelling, near-syncope, orthopnea, palpitations, paroxysmal nocturnal dyspnea and syncope.   Respiratory:  Negative for cough and shortness of breath.    Gastrointestinal:  Negative for abdominal pain, diarrhea, nausea and vomiting.   Neurological:  Negative for light-headedness.   Psychiatric/Behavioral:  Negative for altered mental status and substance abuse.      Objective:     Vital Signs (Most Recent):  Temp: 98 °F (36.7 °C) (01/18/24 0700)  Pulse: 82 (01/18/24 0900)  Resp: (!) 25 (01/18/24 0900)  BP: 119/61 (01/18/24 0845)  SpO2: 100 % (01/18/24 0900) Vital Signs (24h Range):  Temp:  [96.3 °F (35.7 °C)-98 °F (36.7 °C)] 98 °F (36.7 °C)  Pulse:  [62-91] 82  Resp:  [10-49] 25  SpO2:  [90 %-100 %] 100 %  BP: ()/(51-81) 119/61     Weight: 77 kg (169 lb 12.1 oz)  Body mass index is 28.25 kg/m².     SpO2: 100 %        Physical Exam  Constitutional:       Appearance: Normal appearance.   Cardiovascular:      Rate and Rhythm: Normal rate and regular rhythm.      Pulses: Normal pulses.      Heart sounds: Normal heart sounds.      Comments: TVP in place  Pulmonary:      Effort: Pulmonary effort is normal.      Breath sounds: Normal breath sounds. No wheezing or rales.   Abdominal:      General: Abdomen is flat. There is no distension.      Palpations: Abdomen is soft.      Tenderness: There is no abdominal tenderness.   Musculoskeletal:       Right lower leg: No edema.      Left lower leg: No edema.   Skin:     General: Skin is warm and dry.   Neurological:      Mental Status: She is alert and oriented to person, place, and time. Mental status is at baseline.   Psychiatric:         Mood and Affect: Mood normal.         Behavior: Behavior normal.            Significant Labs: All pertinent lab results from the last 24 hours have been reviewed.    Significant Imaging:  Reviewed  Assessment and Plan:     Atrial fibrillation  #Bradycardia    73yoF with ESRD admitted for bronchial tumor removal with post op course complicated by atrial fibrillation. She had difficult to control rates but further post-operatively, her rates improved so was weaned off rate controlling agents. Overnight 1/11-1/12 and morning of 1/12 she had several bradycardic episodes in the setting of atrial fibrillation, recent digoxin use (reportedly renally dosed, but some high levels with ESRD).    In the setting of asystolic events with several short rounds of compressions (no more than 1 round at a time), TVP was attempted. 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 She is post PTA and stenting of the left innominate vein. This was done at an outside hospital. Micra implantation was decided on due to infection risk and difficult venous anatomy.    EF 60-65% during this admission  Jan 13, 2024 EKG: Sinus rhythm, QRS 76    Recommendations:  - Micra in placed 1/16  - S/p chest tube 1/17  - Potential contralateral effusion drainage per IR today  - When done with procedures can reset her Micra back to VDD 50        Connor M Gillies, MD  Cardiac Electrophysiology  [facility]

## 2024-01-18 NOTE — PROGRESS NOTES
[facility]  Thoracic Surgery  Progress Note    Subjective:     History of Present Illness:  No notes on file    Post-Op Info:  Procedure(s) (LRB):  INSERTION, CARDIAC PACEMAKER, LEADLESS (N/A)   2 Days Post-Op     Interval History: s/p micra placement with EP 1/16. Pacer set to 60 - while she does drift down, feels her symptoms are not as severe as at 40.   Got to chair briefly yesterday  Diffuse edema of BL upper and lower extremities  R CT guided chest tube via IR yesterday; 1460 cc output, serosanguinous, CXR much improved  For L CT guided chest tube today   SLED overnight  Feels tired       Medications:  Continuous Infusions:   sodium chloride 0.9% 10 mL/hr at 01/16/24 1200     Scheduled Meds:   droNABinol  5 mg Oral BID    epoetin noble (PROCRIT) injection  4,000 Units Intravenous Every Mon, Wed, Fri    [START ON 1/19/2024] insulin detemir U-100  8 Units Subcutaneous Daily    LIDOcaine  1 patch Transdermal Q24H    meropenem (MERREM) IVPB  500 mg Intravenous Q12H    oxybutynin  5 mg Oral TID    pantoprazole  40 mg Oral Daily    polyethylene glycol  17 g Oral Daily    senna-docusate 8.6-50 mg  1 tablet Oral Daily     PRN Meds:sodium chloride 0.9%, albuterol sulfate, bisacodyL, dextrose 10%, dextrose 10%, glucagon (human recombinant), glucose, glucose, heparin (porcine), hydrALAZINE, insulin aspart U-100, ondansetron, sodium chloride 0.9%     Review of patient's allergies indicates:   Allergen Reactions    Crestor [rosuvastatin] Swelling    Gluten protein      Objective:     Vital Signs (Most Recent):  Temp: 97.5 °F (36.4 °C) (01/18/24 1100)  Pulse: 86 (01/18/24 1143)  Resp: 20 (01/18/24 1143)  BP: 132/66 (01/18/24 1100)  SpO2: 100 % (01/18/24 1143) Vital Signs (24h Range):  Temp:  [96.3 °F (35.7 °C)-98 °F (36.7 °C)] 97.5 °F (36.4 °C)  Pulse:  [62-89] 86  Resp:  [8-42] 20  SpO2:  [91 %-100 %] 100 %  BP: ()/(51-81) 132/66     Intake/Output - Last 3 Shifts         01/16 0700  01/17 0659  01/17 0700  01/18 0659 01/18 0700  01/19 0659    P.O.       I.V. (mL/kg) 73.6 (1) 1354.3 (17.6) 1045 (13.6)    IV Piggyback 607.9 199.6     Total Intake(mL/kg) 681.5 (8.9) 1553.9 (20.2) 1045 (13.6)    Other  2621 1043    Stool       Chest Tube  1460 280    Total Output  4081 1323    Net +681.5 -2527.1 -278           Stool Occurrence 1 x              SpO2: 100 %       Physical Exam  Vitals and nursing note reviewed.   Constitutional:       Appearance: Normal appearance.   HENT:      Head: Normocephalic and atraumatic.      Nose: Nose normal.   Cardiovascular:      Rate and Rhythm: Normal rate and regular rhythm.      Pulses: Normal pulses.   Pulmonary:      Effort: Pulmonary effort is normal. No respiratory distress.      Comments: Right posterior chest incision c/d/I    Right chest permacath    Right Chest tube, 10 fr, serosanguinous output, waterseal   Abdominal:      General: Abdomen is flat. There is no distension.      Palpations: Abdomen is soft.      Tenderness: There is no abdominal tenderness.      Comments: Soft, nontender abdomen   Musculoskeletal:      Right lower leg: Edema present.      Left lower leg: Edema present.   Skin:     General: Skin is warm and dry.      Coloration: Skin is pale.      Comments: Groin soft, dressings c/d/i   Neurological:      General: No focal deficit present.      Mental Status: She is alert.            Significant Labs:  CBC:   Recent Labs   Lab 01/18/24  0348   WBC 21.82*   RBC 2.45*   HGB 7.2*   HCT 22.5*      MCV 92   MCH 29.4   MCHC 32.0         Significant Diagnostics:  I have reviewed and interpreted all pertinent imaging results/findings within the past 24 hours.    VTE Risk Mitigation (From admission, onward)           Ordered     heparin 25,000 units in dextrose 5% 250 mL (100 units/mL) infusion LOW INTENSITY nomogram - [facility]  Continuous        Question:  Begin at (units/kg/hr)  Answer:  16    01/15/24 1038     heparin (porcine) injection 1,000 Units  As needed  (PRN)         01/11/24 1351     heparin (porcine) injection 1,000 Units  As needed (PRN)         01/03/24 1123     IP VTE HIGH RISK PATIENT  Once         01/02/24 1226     Place CARMELITA hose  Until discontinued         01/02/24 1226     Place sequential compression device  Until discontinued         01/02/24 1226                  Assessment/Plan:     * Malignant carcinoid tumor of bronchus  72 yo female with ESRD (TTS), DDD, benign essential tremor, meniere's disease, HTN, HLD, DM2, GERD, Celiac's, IBS and 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 Stepped up to the ICU 1/5 for afib RVR which was refractory to medical management and c/b development of tachy-eulogio syndrome now s/p micra placement 1/16 for tachy-eulogio syndrome in the setting of RVR. Did have brief SBO type picture 1/7 which resolved with conservative management.     - s/p R thoracotomy with R lower lobectomy for carcinoid w/ MLND; IPV injury requiring repair. CT removed Post operatively.  - Refractory afib RVR requiring ICU step up 1/05, s/p chemical cardioversion initially with recurrence c/b developing tachy-eulogio syndrome     - cards and EP following, appreciate assistance     - s/p TVP placement and now micra implantation 1/16    - hold hep gtt 5 days post op  - CT chest 1/13 w/ bilateral pleural effusion (R>L), pericardial effusion,   - IR for thoracentesis and possible drain placement for left chest today  - s/p R chest tube placement with IR 1/17   - Pulmonary toilet: IS, Acapella, Mucomyst daily   - Infectious work up 2/2 leukocytosis. She remains afebrile. Effusions and compressive atelectasis also likely contributing to leukocytosis.Trended up to 30k 1/16, now downtrending. Zosyn 1/11-1/15; Meropenam 1/16 -    - ID following, appreciate assistance. Reconsult following thora results.   - Bcx 1/11: Staph, probable contaminant  - Bcx1/12: NGTD   - Rapid blood PCR: staph  epi  - Sputum Cx 1/11: klebsiella & e. Coli  - R pleural fluid Cx 1/17: NGTD  - SBO type episode 1/07 requiring NGT decompression, which resolved with conservative management   - Renal, gluten free diet, novasource renal BID; marinol for appetite   - Bowel regimen, PPI  - Nephrology following, appreciate assistance. CRRT 1/17 overnight.   - Oxybutinin for bladder spasms   - MM pain control as needed  - Needs PT/OT, mobilization, likely post hospital placement         Dispo: continue ICU care; work towards stepdown/placement as arrythmias come under control and she begins to normalize          Kat Flores MD  Thoracic Surgery  [facility]

## 2024-01-18 NOTE — PLAN OF CARE
Problem: Physical Therapy  Goal: Physical Therapy Goal  Description: PT goals until 2/9/24    1. Pt supine to sit with supervision-not met  2. Pt sit to supine with supervision-not met  3. Pt sit to stand with AD if needed with supervision-not met  4. Pt to perform gait 150 ft with AD if needed with supervision.-not met  5. Pt to go up/down curb step with AD if needed with CGA.-not met    Outcome: Ongoing, Progressing   Goals remain appropriate. 1/18/2024

## 2024-01-18 NOTE — PLAN OF CARE
SICU Plan of Care Note:     Pt returns from IR sp L chest tube placement. Drowsy on arrival. L chest tube without output in the chamber initially but quickly drained ~300 of serous fluid after placing on suction.     Awaiting CXR, ok to eat once she wakes up a little more and passes bedside swallow test. Will keep her pacer at current settings (backup at 60) as she feels less symptomatic. Tomorrow will prioritize PT/OT and rehabilitation around her HD schedule.     CORNEL Sapp MD  General Surgery, PGY-2  [phone]

## 2024-01-18 NOTE — PLAN OF CARE
Recommendations     1. If and when medically feasible, recommend Renal, gluten free diet, texture per SLP       2. If and when medically feasible, recommend ONS Novasource renal BID       3. RD to monitor and follow        Goals: Meet % EEN, EPN by RD f/u  Nutrition Goal Status: progressing towards goal  Communication of RD Recs:  (POC)

## 2024-01-18 NOTE — ASSESSMENT & PLAN NOTE
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 Stepped up to the ICU 1/5 for afib RVR which was refractory to medical management and c/b development of tachy-eulogio syndrome now s/p micra placement 1/16 for tachy-eulogio syndrome in the setting of RVR. Did have brief SBO type picture 1/7 which resolved with conservative management.     - s/p R thoracotomy with R lower lobectomy for carcinoid w/ MLND; IPV injury requiring repair. CT removed Post operatively.  - Refractory afib RVR requiring ICU step up 1/05, s/p chemical cardioversion initially with recurrence c/b developing tachy-eulogio syndrome     - cards and EP following, appreciate assistance     - s/p TVP placement and now micra implantation 1/16    - hold hep gtt 5 days post op  - CT chest 1/13 w/ bilateral pleural effusion (R>L), pericardial effusion,   - IR for thoracentesis and possible drain placement for left chest today  - s/p R chest tube placement with IR 1/17   - Pulmonary toilet: IS, Acapella, Mucomyst daily   - Infectious work up 2/2 leukocytosis. She remains afebrile. Effusions and compressive atelectasis also likely contributing to leukocytosis.Trended up to 30k 1/16, now downtrending. Zosyn 1/11-1/15; Meropenam 1/16 -    - ID following, appreciate assistance. Reconsult following thora results.   - Bcx 1/11: Staph, probable contaminant  - Bcx1/12: NGTD   - Rapid blood PCR: staph epi  - Sputum Cx 1/11: klebsiella & e. Coli  - R pleural fluid Cx 1/17: NGTD  - SBO type episode 1/07 requiring NGT decompression, which resolved with conservative management   - Renal, gluten free diet, novasource renal BID; marinol for appetite   - Bowel regimen, PPI  - Nephrology following, appreciate assistance. CRRT 1/17 overnight.   - Oxybutinin for bladder spasms   - MM pain control  as needed  - Needs PT/OT, mobilization, likely post hospital placement         Dispo: continue ICU care; work towards stepdown/placement as arrythmias come under control and she begins to normalize

## 2024-01-19 LAB
ALBUMIN SERPL BCP-MCNC: 1.9 G/DL (ref 3.5–5.2)
ALBUMIN SERPL BCP-MCNC: 2 G/DL (ref 3.5–5.2)
ALP SERPL-CCNC: 133 U/L (ref 55–135)
ALT SERPL W/O P-5'-P-CCNC: 12 U/L (ref 10–44)
ANION GAP SERPL CALC-SCNC: 5 MMOL/L (ref 8–16)
ANION GAP SERPL CALC-SCNC: 6 MMOL/L (ref 8–16)
ANION GAP SERPL CALC-SCNC: 7 MMOL/L (ref 8–16)
ANION GAP SERPL CALC-SCNC: 8 MMOL/L (ref 8–16)
APTT PPP: 24.8 SEC (ref 21–32)
AST SERPL-CCNC: 19 U/L (ref 10–40)
BASOPHILS # BLD AUTO: 0.04 K/UL (ref 0–0.2)
BASOPHILS NFR BLD: 0.2 % (ref 0–1.9)
BILIRUB SERPL-MCNC: 0.7 MG/DL (ref 0.1–1)
BUN SERPL-MCNC: 16 MG/DL (ref 8–23)
BUN SERPL-MCNC: 17 MG/DL (ref 8–23)
BUN SERPL-MCNC: 18 MG/DL (ref 8–23)
BUN SERPL-MCNC: 19 MG/DL (ref 8–23)
CALCIUM SERPL-MCNC: 8 MG/DL (ref 8.7–10.5)
CALCIUM SERPL-MCNC: 8.1 MG/DL (ref 8.7–10.5)
CALCIUM SERPL-MCNC: 8.2 MG/DL (ref 8.7–10.5)
CALCIUM SERPL-MCNC: 8.3 MG/DL (ref 8.7–10.5)
CHLORIDE SERPL-SCNC: 104 MMOL/L (ref 95–110)
CHLORIDE SERPL-SCNC: 105 MMOL/L (ref 95–110)
CHLORIDE SERPL-SCNC: 107 MMOL/L (ref 95–110)
CHLORIDE SERPL-SCNC: 107 MMOL/L (ref 95–110)
CO2 SERPL-SCNC: 22 MMOL/L (ref 23–29)
CO2 SERPL-SCNC: 22 MMOL/L (ref 23–29)
CO2 SERPL-SCNC: 23 MMOL/L (ref 23–29)
CO2 SERPL-SCNC: 25 MMOL/L (ref 23–29)
CREAT SERPL-MCNC: 1.2 MG/DL (ref 0.5–1.4)
CREAT SERPL-MCNC: 1.4 MG/DL (ref 0.5–1.4)
CREAT SERPL-MCNC: 1.6 MG/DL (ref 0.5–1.4)
CREAT SERPL-MCNC: 1.7 MG/DL (ref 0.5–1.4)
DIFFERENTIAL METHOD BLD: ABNORMAL
EOSINOPHIL # BLD AUTO: 0 K/UL (ref 0–0.5)
EOSINOPHIL NFR BLD: 0.1 % (ref 0–8)
ERYTHROCYTE [DISTWIDTH] IN BLOOD BY AUTOMATED COUNT: 21.7 % (ref 11.5–14.5)
EST. GFR  (NO RACE VARIABLE): 31.5 ML/MIN/1.73 M^2
EST. GFR  (NO RACE VARIABLE): 33.8 ML/MIN/1.73 M^2
EST. GFR  (NO RACE VARIABLE): 39.7 ML/MIN/1.73 M^2
EST. GFR  (NO RACE VARIABLE): 47.8 ML/MIN/1.73 M^2
FINAL PATHOLOGIC DIAGNOSIS: NORMAL
GLUCOSE SERPL-MCNC: 138 MG/DL (ref 70–110)
GLUCOSE SERPL-MCNC: 138 MG/DL (ref 70–110)
GLUCOSE SERPL-MCNC: 147 MG/DL (ref 70–110)
GLUCOSE SERPL-MCNC: 147 MG/DL (ref 70–110)
HCT VFR BLD AUTO: 22.5 % (ref 37–48.5)
HGB BLD-MCNC: 7.2 G/DL (ref 12–16)
IMM GRANULOCYTES # BLD AUTO: 0.71 K/UL (ref 0–0.04)
IMM GRANULOCYTES NFR BLD AUTO: 3 % (ref 0–0.5)
LYMPHOCYTES # BLD AUTO: 1.2 K/UL (ref 1–4.8)
LYMPHOCYTES NFR BLD: 5.2 % (ref 18–48)
Lab: NORMAL
MAGNESIUM SERPL-MCNC: 2.1 MG/DL (ref 1.6–2.6)
MAGNESIUM SERPL-MCNC: 2.1 MG/DL (ref 1.6–2.6)
MAGNESIUM SERPL-MCNC: 2.3 MG/DL (ref 1.6–2.6)
MAGNESIUM SERPL-MCNC: 2.3 MG/DL (ref 1.6–2.6)
MCH RBC QN AUTO: 29.3 PG (ref 27–31)
MCHC RBC AUTO-ENTMCNC: 32 G/DL (ref 32–36)
MCV RBC AUTO: 92 FL (ref 82–98)
MONOCYTES # BLD AUTO: 1.3 K/UL (ref 0.3–1)
MONOCYTES NFR BLD: 5.7 % (ref 4–15)
NEUTROPHILS # BLD AUTO: 20 K/UL (ref 1.8–7.7)
NEUTROPHILS NFR BLD: 85.8 % (ref 38–73)
NRBC BLD-RTO: 0 /100 WBC
PHOSPHATE SERPL-MCNC: 1.4 MG/DL (ref 2.7–4.5)
PHOSPHATE SERPL-MCNC: 2.3 MG/DL (ref 2.7–4.5)
PHOSPHATE SERPL-MCNC: 2.6 MG/DL (ref 2.7–4.5)
PHOSPHATE SERPL-MCNC: 2.7 MG/DL (ref 2.7–4.5)
PLATELET # BLD AUTO: 194 K/UL (ref 150–450)
PMV BLD AUTO: 11.4 FL (ref 9.2–12.9)
POCT GLUCOSE: 155 MG/DL (ref 70–110)
POCT GLUCOSE: 155 MG/DL (ref 70–110)
POCT GLUCOSE: 156 MG/DL (ref 70–110)
POCT GLUCOSE: 181 MG/DL (ref 70–110)
POCT GLUCOSE: 278 MG/DL (ref 70–110)
POTASSIUM SERPL-SCNC: 3.4 MMOL/L (ref 3.5–5.1)
POTASSIUM SERPL-SCNC: 4.7 MMOL/L (ref 3.5–5.1)
POTASSIUM SERPL-SCNC: 4.7 MMOL/L (ref 3.5–5.1)
POTASSIUM SERPL-SCNC: 4.8 MMOL/L (ref 3.5–5.1)
PROT SERPL-MCNC: 4.7 G/DL (ref 6–8.4)
RBC # BLD AUTO: 2.46 M/UL (ref 4–5.4)
SODIUM SERPL-SCNC: 133 MMOL/L (ref 136–145)
SODIUM SERPL-SCNC: 135 MMOL/L (ref 136–145)
SODIUM SERPL-SCNC: 136 MMOL/L (ref 136–145)
SODIUM SERPL-SCNC: 137 MMOL/L (ref 136–145)
WBC # BLD AUTO: 23.34 K/UL (ref 3.9–12.7)

## 2024-01-19 PROCEDURE — 83735 ASSAY OF MAGNESIUM: CPT | Performed by: INTERNAL MEDICINE

## 2024-01-19 PROCEDURE — 85025 COMPLETE CBC W/AUTO DIFF WBC: CPT | Performed by: SURGERY

## 2024-01-19 PROCEDURE — 97530 THERAPEUTIC ACTIVITIES: CPT

## 2024-01-19 PROCEDURE — 63600175 PHARM REV CODE 636 W HCPCS

## 2024-01-19 PROCEDURE — 85730 THROMBOPLASTIN TIME PARTIAL: CPT

## 2024-01-19 PROCEDURE — 25000003 PHARM REV CODE 250

## 2024-01-19 PROCEDURE — 93010 ELECTROCARDIOGRAM REPORT: CPT | Mod: ,,, | Performed by: INTERNAL MEDICINE

## 2024-01-19 PROCEDURE — 80069 RENAL FUNCTION PANEL: CPT | Performed by: INTERNAL MEDICINE

## 2024-01-19 PROCEDURE — 83735 ASSAY OF MAGNESIUM: CPT | Mod: 91 | Performed by: INTERNAL MEDICINE

## 2024-01-19 PROCEDURE — 63600175 PHARM REV CODE 636 W HCPCS: Performed by: NURSE PRACTITIONER

## 2024-01-19 PROCEDURE — 99233 SBSQ HOSP IP/OBS HIGH 50: CPT | Mod: FS,,, | Performed by: INTERNAL MEDICINE

## 2024-01-19 PROCEDURE — 80100014 HC HEMODIALYSIS 1:1

## 2024-01-19 PROCEDURE — 11000001 HC ACUTE MED/SURG PRIVATE ROOM

## 2024-01-19 PROCEDURE — 93005 ELECTROCARDIOGRAM TRACING: CPT

## 2024-01-19 PROCEDURE — 25000003 PHARM REV CODE 250: Performed by: NURSE PRACTITIONER

## 2024-01-19 PROCEDURE — 97535 SELF CARE MNGMENT TRAINING: CPT

## 2024-01-19 PROCEDURE — 25000003 PHARM REV CODE 250: Performed by: STUDENT IN AN ORGANIZED HEALTH CARE EDUCATION/TRAINING PROGRAM

## 2024-01-19 PROCEDURE — 99233 SBSQ HOSP IP/OBS HIGH 50: CPT | Mod: 24,FS,, | Performed by: STUDENT IN AN ORGANIZED HEALTH CARE EDUCATION/TRAINING PROGRAM

## 2024-01-19 PROCEDURE — 80053 COMPREHEN METABOLIC PANEL: CPT | Performed by: SURGERY

## 2024-01-19 PROCEDURE — 94761 N-INVAS EAR/PLS OXIMETRY MLT: CPT

## 2024-01-19 PROCEDURE — 99232 SBSQ HOSP IP/OBS MODERATE 35: CPT | Mod: FS,,, | Performed by: INTERNAL MEDICINE

## 2024-01-19 PROCEDURE — 84100 ASSAY OF PHOSPHORUS: CPT | Performed by: SURGERY

## 2024-01-19 PROCEDURE — 63600175 PHARM REV CODE 636 W HCPCS: Performed by: STUDENT IN AN ORGANIZED HEALTH CARE EDUCATION/TRAINING PROGRAM

## 2024-01-19 PROCEDURE — 25000003 PHARM REV CODE 250: Performed by: SURGERY

## 2024-01-19 RX ORDER — INSULIN ASPART 100 [IU]/ML
0-10 INJECTION, SOLUTION INTRAVENOUS; SUBCUTANEOUS
Status: DISCONTINUED | OUTPATIENT
Start: 2024-01-19 | End: 2024-02-06 | Stop reason: HOSPADM

## 2024-01-19 RX ORDER — MIDODRINE HYDROCHLORIDE 5 MG/1
5 TABLET ORAL 2 TIMES DAILY WITH MEALS
Status: DISCONTINUED | OUTPATIENT
Start: 2024-01-19 | End: 2024-01-23

## 2024-01-19 RX ORDER — METOPROLOL TARTRATE 1 MG/ML
5 INJECTION, SOLUTION INTRAVENOUS EVERY 5 MIN PRN
Status: DISCONTINUED | OUTPATIENT
Start: 2024-01-19 | End: 2024-01-19

## 2024-01-19 RX ORDER — MIDODRINE HYDROCHLORIDE 5 MG/1
10 TABLET ORAL
Status: DISCONTINUED | OUTPATIENT
Start: 2024-01-19 | End: 2024-01-22

## 2024-01-19 RX ORDER — METOPROLOL TARTRATE 1 MG/ML
5 INJECTION, SOLUTION INTRAVENOUS EVERY 5 MIN PRN
Status: COMPLETED | OUTPATIENT
Start: 2024-01-19 | End: 2024-01-19

## 2024-01-19 RX ORDER — AMIODARONE HYDROCHLORIDE 50 MG/ML
INJECTION, SOLUTION INTRAVENOUS ONCE
Status: CANCELLED | OUTPATIENT
Start: 2024-01-20 | End: 2024-01-20

## 2024-01-19 RX ORDER — METOPROLOL TARTRATE 1 MG/ML
INJECTION, SOLUTION INTRAVENOUS
Status: COMPLETED
Start: 2024-01-19 | End: 2024-01-19

## 2024-01-19 RX ADMIN — POLYETHYLENE GLYCOL 3350 17 G: 17 POWDER, FOR SOLUTION ORAL at 09:01

## 2024-01-19 RX ADMIN — MIDODRINE HYDROCHLORIDE 5 MG: 5 TABLET ORAL at 12:01

## 2024-01-19 RX ADMIN — INSULIN ASPART 2 UNITS: 100 INJECTION, SOLUTION INTRAVENOUS; SUBCUTANEOUS at 03:01

## 2024-01-19 RX ADMIN — INSULIN ASPART 3 UNITS: 100 INJECTION, SOLUTION INTRAVENOUS; SUBCUTANEOUS at 08:01

## 2024-01-19 RX ADMIN — METOROPROLOL TARTRATE 5 MG: 5 INJECTION, SOLUTION INTRAVENOUS at 05:01

## 2024-01-19 RX ADMIN — INSULIN ASPART 2 UNITS: 100 INJECTION, SOLUTION INTRAVENOUS; SUBCUTANEOUS at 05:01

## 2024-01-19 RX ADMIN — SODIUM CHLORIDE, POTASSIUM CHLORIDE, SODIUM LACTATE AND CALCIUM CHLORIDE 500 ML: 600; 310; 30; 20 INJECTION, SOLUTION INTRAVENOUS at 10:01

## 2024-01-19 RX ADMIN — OXYBUTYNIN CHLORIDE 5 MG: 5 TABLET ORAL at 08:01

## 2024-01-19 RX ADMIN — DRONABINOL 5 MG: 2.5 CAPSULE ORAL at 09:01

## 2024-01-19 RX ADMIN — ONDANSETRON 8 MG: 2 INJECTION INTRAMUSCULAR; INTRAVENOUS at 09:01

## 2024-01-19 RX ADMIN — PANTOPRAZOLE SODIUM 40 MG: 40 TABLET, DELAYED RELEASE ORAL at 09:01

## 2024-01-19 RX ADMIN — SENNOSIDES AND DOCUSATE SODIUM 1 TABLET: 8.6; 5 TABLET ORAL at 09:01

## 2024-01-19 RX ADMIN — INSULIN ASPART 2 UNITS: 100 INJECTION, SOLUTION INTRAVENOUS; SUBCUTANEOUS at 12:01

## 2024-01-19 RX ADMIN — MEROPENEM 500 MG: 500 INJECTION INTRAVENOUS at 11:01

## 2024-01-19 RX ADMIN — MEROPENEM 500 MG: 500 INJECTION INTRAVENOUS at 10:01

## 2024-01-19 RX ADMIN — INSULIN ASPART 2 UNITS: 100 INJECTION, SOLUTION INTRAVENOUS; SUBCUTANEOUS at 09:01

## 2024-01-19 RX ADMIN — DRONABINOL 5 MG: 2.5 CAPSULE ORAL at 08:01

## 2024-01-19 RX ADMIN — OXYBUTYNIN CHLORIDE 5 MG: 5 TABLET ORAL at 09:01

## 2024-01-19 RX ADMIN — SODIUM CHLORIDE, POTASSIUM CHLORIDE, SODIUM LACTATE AND CALCIUM CHLORIDE 500 ML: 600; 310; 30; 20 INJECTION, SOLUTION INTRAVENOUS at 11:01

## 2024-01-19 RX ADMIN — OXYBUTYNIN CHLORIDE 5 MG: 5 TABLET ORAL at 02:01

## 2024-01-19 RX ADMIN — LIDOCAINE 5% 1 PATCH: 700 PATCH TOPICAL at 11:01

## 2024-01-19 RX ADMIN — HEPARIN SODIUM 1000 UNITS: 1000 INJECTION, SOLUTION INTRAVENOUS; SUBCUTANEOUS at 05:01

## 2024-01-19 RX ADMIN — ERYTHROPOIETIN 4000 UNITS: 4000 INJECTION, SOLUTION INTRAVENOUS; SUBCUTANEOUS at 11:01

## 2024-01-19 RX ADMIN — MIDODRINE HYDROCHLORIDE 10 MG: 5 TABLET ORAL at 01:01

## 2024-01-19 NOTE — SUBJECTIVE & OBJECTIVE
Interval History:   CT draining 2.3L over the past, negative 2L for the day.  Electrolytes stable and non-emergent. Labile blood pressures, normally takes midodrine at home.      Review of patient's allergies indicates:   Allergen Reactions    Crestor [rosuvastatin] Swelling    Gluten protein      Current Facility-Administered Medications   Medication Frequency    0.9%  NaCl infusion PRN    0.9%  NaCl infusion Continuous    albuterol sulfate nebulizer solution 2.5 mg Q4H PRN    bisacodyL suppository 10 mg Daily PRN    dextrose 10% bolus 125 mL 125 mL PRN    dextrose 10% bolus 250 mL 250 mL PRN    droNABinol capsule 5 mg BID    epoetin noble injection 4,000 Units Every Mon, Wed, Fri    glucagon (human recombinant) injection 1 mg PRN    glucose chewable tablet 16 g PRN    glucose chewable tablet 24 g PRN    heparin (porcine) injection 1,000 Units PRN    hydrALAZINE injection 10 mg Q6H PRN    insulin aspart U-100 pen 0-10 Units QID (AC + HS) PRN    insulin detemir U-100 (Levemir) pen 6 Units Daily    LIDOcaine 5 % patch 1 patch Q24H    meropenem (MERREM) 500 mg in sodium chloride 0.9 % 100 mL IVPB (MB+) Q12H    midodrine tablet 10 mg PRN    midodrine tablet 5 mg BID WM    ondansetron injection 8 mg Q8H PRN    oxybutynin tablet 5 mg TID    pantoprazole EC tablet 40 mg Daily    polyethylene glycol packet 17 g Daily    senna-docusate 8.6-50 mg per tablet 1 tablet Daily    sodium chloride 0.9% flush 10 mL PRN       Objective:     Vital Signs (Most Recent):  Temp: 97.5 °F (36.4 °C) (01/19/24 1130)  Pulse: 88 (01/19/24 1130)  Resp: (!) 24 (01/19/24 1130)  BP: (!) 86/53 (01/19/24 1100)  SpO2: 99 % (01/19/24 1130) Vital Signs (24h Range):  Temp:  [97.3 °F (36.3 °C)-98 °F (36.7 °C)] 97.5 °F (36.4 °C)  Pulse:  [77-94] 88  Resp:  [15-43] 24  SpO2:  [88 %-100 %] 99 %  BP: ()/(51-70) 86/53     Weight: 77 kg (169 lb 12.1 oz) (01/17/24 0615)  Body mass index is 28.25 kg/m².  Body surface area is 1.88 meters squared.    I/O last  3 completed shifts:  In: 2729 [I.V.:2381.6; IV Piggyback:347.4]  Out: 6124 [Other:3664; Chest Tube:2460]     Physical Exam  Vitals and nursing note reviewed.   Constitutional:       Appearance: She is well-developed. She is ill-appearing.   HENT:      Head: Normocephalic and atraumatic.      Right Ear: External ear normal.      Left Ear: External ear normal.   Eyes:      General: No scleral icterus.        Right eye: No discharge.         Left eye: No discharge.      Conjunctiva/sclera: Conjunctivae normal.   Cardiovascular:      Rate and Rhythm: Normal rate and regular rhythm.      Heart sounds: Normal heart sounds. No murmur heard.     No friction rub. No gallop.   Pulmonary:      Effort: Pulmonary effort is normal. No respiratory distress.      Breath sounds: No stridor. Examination of the right-lower field reveals decreased breath sounds. Decreased breath sounds present. No wheezing or rales.   Abdominal:      General: Bowel sounds are normal.   Musculoskeletal:         General: No tenderness.      Right lower leg: Edema present.      Left lower leg: Edema present.   Skin:     General: Skin is warm and dry.   Neurological:      Mental Status: She is alert and oriented to person, place, and time.          Significant Labs:  CBC:   Recent Labs   Lab 01/19/24 0318   WBC 23.34*   RBC 2.46*   HGB 7.2*   HCT 22.5*      MCV 92   MCH 29.3   MCHC 32.0     CMP:   Recent Labs   Lab 01/19/24 0318   *  138*   CALCIUM 8.0*  8.1*   ALBUMIN 1.9*  1.9*   PROT 4.7*     133*   K 4.8  4.7   CO2 22*  22*     105   BUN 19  18   CREATININE 1.7*  1.6*   ALKPHOS 133   ALT 12   AST 19   BILITOT 0.7

## 2024-01-19 NOTE — PROGRESS NOTES
[facility]  Critical Care - Surgery  Progress Note    Patient Name: Lily Green  MRN: [id number]  Admission Date: 1/2/2024  Hospital Length of Stay: 17 days  Code Status: Full Code  Attending Provider: Tan Thomson MD  Primary Care Provider: Pavel Calixto MD   Principal Problem: Malignant carcinoid tumor of bronchus    Subjective:     Hospital/ICU Course:  No notes on file    Interval History/Significant Events: S/p left sided chest tube placement with IR yesterday, 1.8L output. CXR stable since yesterday afternoon. Satting 100% on RA. Patient currently in NSR in 80's with PPM set to VVI 60. Patient with swelling of bilateral upper extremities. DVT US of all extremities negative yesterday. Pleural cultures NGTD.    Follow-up For: Procedure(s) (LRB):  INSERTION, CARDIAC PACEMAKER, LEADLESS (N/A)    Post-Operative Day: 3 Days Post-Op    Objective:     Vital Signs (Most Recent):  Temp: 98 °F (36.7 °C) (01/19/24 0701)  Pulse: 88 (01/19/24 0900)  Resp: (!) 35 (01/19/24 0900)  BP: (!) 98/54 (01/19/24 0900)  SpO2: 96 % (01/19/24 0900) Vital Signs (24h Range):  Temp:  [97.3 °F (36.3 °C)-98 °F (36.7 °C)] 98 °F (36.7 °C)  Pulse:  [77-94] 88  Resp:  [5-43] 35  SpO2:  [88 %-100 %] 96 %  BP: ()/(51-70) 98/54     Weight: 77 kg (169 lb 12.1 oz)  Body mass index is 28.25 kg/m².      Intake/Output Summary (Last 24 hours) at 1/19/2024 1024  Last data filed at 1/19/2024 0943  Gross per 24 hour   Intake 647.55 ml   Output 2385 ml   Net -1737.45 ml          Physical Exam  Vitals and nursing note reviewed.   Constitutional:       General: She is not in acute distress.     Appearance: Normal appearance. She is normal weight. She is ill-appearing. She is not toxic-appearing.   HENT:      Head: Normocephalic and atraumatic.      Right Ear: External ear normal.      Left Ear: External ear normal.      Nose: Nose normal.      Comments: Nasal cannula in place  Eyes:      General: No scleral  icterus.        Right eye: No discharge.         Left eye: No discharge.      Extraocular Movements: Extraocular movements intact.      Conjunctiva/sclera: Conjunctivae normal.   Cardiovascular:      Rate and Rhythm: Normal rate and regular rhythm.   Pulmonary:      Effort: Pulmonary effort is normal. No respiratory distress.      Comments: L chest tube in place  R chest tube in place  Abdominal:      General: Abdomen is flat. There is no distension.      Palpations: Abdomen is soft. There is no mass.      Tenderness: There is no abdominal tenderness. There is no guarding.      Hernia: No hernia is present.   Genitourinary:     Comments: Groin incisions covered with gauze and Tegaderm bilaterally, no breakthrough bleeding or hematomas noted  Musculoskeletal:         General: Swelling (BUE edema) present. Normal range of motion.      Cervical back: Normal range of motion and neck supple.   Skin:     General: Skin is warm and dry.   Neurological:      General: No focal deficit present.      Mental Status: She is alert and oriented to person, place, and time. Mental status is at baseline.   Psychiatric:         Mood and Affect: Mood normal.         Behavior: Behavior normal.         Thought Content: Thought content normal.            Vents:  Oxygen Concentration (%): 24 (01/15/24 0200)    Lines/Drains/Airways       Central Venous Catheter Line  Duration                  Hemodialysis Catheter 01/25/23 1501 right internal jugular 358 days              Drain  Duration                  Chest Tube 01/17/24 1504 Right Pleural 10 Fr. 1 day         Chest Tube 01/18/24 1537 Left Pleural 10 Fr. <1 day              Peripheral Intravenous Line  Duration                  Hemodialysis AV Fistula 09/01/23 0800 Left forearm 140 days         Peripheral IV - Single Lumen 01/15/24 1230 20 G;1 3/4 in Anterior;Proximal;Right Forearm 3 days                    Significant Labs:    CBC/Anemia Profile:  Recent Labs   Lab 01/18/24  4568  01/19/24  0318   WBC 21.82* 23.34*   HGB 7.2* 7.2*   HCT 22.5* 22.5*    194   MCV 92 92   RDW 20.7* 21.7*        Chemistries:  Recent Labs   Lab 01/18/24  0348 01/18/24  1618 01/18/24  2349 01/19/24  0318     136  138 135* 135* 136  133*   K 4.9  4.9  5.0 4.6 4.7 4.8  4.7     108  109 104 104 107  105   CO2 23  23  21* 24 23 22*  22*   BUN 9  9  10 10 17 19  18   CREATININE 1.0  1.0  1.0 1.0 1.4 1.7*  1.6*   CALCIUM 8.0*  8.0*  8.0* 8.3* 8.2* 8.0*  8.1*   ALBUMIN 2.0*  2.0*  2.0* 2.1* 2.0* 1.9*  1.9*   PROT 4.8*  --   --  4.7*   BILITOT 0.8  --   --  0.7   ALKPHOS 136*  --   --  133   ALT 8*  --   --  12   AST 17  --   --  19   MG 1.8  1.8  --  2.3 2.3   PHOS 2.4*  2.4*  2.4* 2.4* 2.3* 2.7  2.6*       All pertinent labs within the past 24 hours have been reviewed.    Significant Imaging:  I have reviewed all pertinent imaging results/findings within the past 24 hours.  Assessment/Plan:     Cardiac/Vascular  Atrial fibrillation  72 yo female with 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 Stepped up to the unit for persistent afib rvr. Had SBO (now resolved). 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  Pacemaker placed on 01/16. R Chest tube placed w IR on 1/17. L sided chest tube placed w IR on 1/18. Cumulative chest tube output of 3.8L. CXR stable. Pleural cultures NGTD.      Neuro/Psych:   -- Sedation: none  -- Pain: Lidocaine patch   -- has some baseline anxiety, no longer on meds     Cards:   -- BP goals SBP >90, MAPs >55  -- Afib RVR, appears to have resolved as she has been NSR in 80's   - Amio and BB held post cardiac arrest   - prn iv metoprolol for sustained RVR > 130's   - AC discontinued in setting of getting PPM  -- Asystolic arrest, now s/p PPM 1/16 rate set to 60 bmp   - 1/12/24 patient asystole with 1 round of compressions and  achievement of ROSC. Multiple bradycardic events prior to which had self resolved. STAT TVP placement unsuccessful 2/2 central venous stenosis. 2nd episode of asystole resolved after minimal compressions. 3rd episode of asystole resolved with singular compression.   - S/p TVP via groin in Cath lab 1/12, removed 1/16   - PPM 1/16 @ 60, heparin gtt hold x 5 days (okay to resume AC on 1/21)  - Symptomatic bradycardic events continue to occur, requiring pacing but remains HDS   - high digoxin level now s/p digibind 1/12 and converted to NSR after administration  - dig level now within normal limits x 2     Pulm:   -- Goal O2 sat > 90%   - satting 100% on RA  -- Surgical chest tube d/c'd 1/8/24  -- CXR with pleural effusions and RLL opacification  -- CT chest (1/13): R worse than L pleural effusion, pericardial effusion   -- IR consulted to place bilateral chest tubes for pleural effusions, 3.8L output total   -- R CT placed 1/17   -- L CT placed 1/18  -- continue incentive spirometry        Renal:  -- history of ESRD, dialysis dependent   - CRRT per nephrology, ideally would work to transition to intermittent HD  -- trend BUN/Cr  -- holding sevelamer in setting of poor PO intake and daily low phos  -- patient is oliguric  -- permacath in place last exchanged 10/2023  -- fistula unable to be used  -- oxybutynin in effort to reduce possible bladder spasm triggering eulogio episodes    Recent Labs   Lab 01/17/24  1314 01/17/24  2218 01/18/24  0348   BUN 31* 19  19 9  9  10   CREATININE 2.5* 1.4  1.4 1.0  1.0  1.0           FEN / GI:   -- CT on 1/8/24 demonstrating SBO, now resolved  -- US on 1/8/24 showing distended gallbladder without signs of acute cholecystitis   - NGT removed.  - Equivocal GGC 1/10  - DC Reglan 1/17  -- PPI  -- Replace lytes as needed  -- Nutrition: diabetic/renal/gluten free soft and bite sized diet with novasource daily  - Aspiration precautions are still of utmost importance  - Appreciate  nursing assistance with pt eating  - Dietician consulted for complex dietary restriction  - home appetite stimulant dronabinol 5 mg BID      ID:   -- Tm: afebrile; WBC stable  -- C/f HAP with RLL consolidation  -- MRSA neg, dc vanc  -- zosyn broadened to meropenem giving pleural effusions  -- blood cultures with 1 bottle growing staph epi, likely a contaminant  -- follow up cultures from pleural fluid, appreciate ID recommendations and assistance    Recent Labs   Lab 01/16/24  0255 01/17/24  0316 01/18/24  0348   WBC 30.10* 23.53* 21.82*           Heme/Onc:  -- Daily CBC  -- heparin gtt for A-fib, hold 5 days after PPM 1/16 (can restart 1/21)  Recent Labs   Lab 01/12/24  0422 01/12/24  0631 01/12/24  1316 01/16/24  0255 01/17/24  0316 01/18/24  0348   HGB 8.9*  --    < > 7.7* 7.6* 7.2*     --    < > 288 256 169   APTT 33.9* 62.5*   < > 22.8 23.9 22.2   INR 1.2 1.2  --   --   --   --     < > = values in this interval not displayed.           Endo:   -- history of diabetes  -- Gluc goal 140-180  -- SSI      PPx:   Feeding: diabetic/renal/gluten diet and novasource (restart after procedure)  Analgesia/Sedation:  Pain is controlled, no sedation  Thromboembolic prevention: heparin gtt held  HOB >30: yes  Stress Ulcer ppx: pantoprazole  Glucose control: Critical care goal 140-180 g/dl, SSI  Bowel reg:  having BM  Invasive Lines/Drains/Airway: Permacath, PIV x2  Deescalation: none        Dispo/Code Status/Palliative:   -- SICU/ Full Code   -- Patient needs to get out of bed and move, appreciate nursing, and PT and OT assistance/help in this matter         Gissel Jack MD  Critical Care - Surgery  [facility]

## 2024-01-19 NOTE — PROGRESS NOTES
Bedside HD completed, blood returned  and catheter ports flushed with normal saline and , heparin instilled into each port as indicated,. Ports capped and secured. Post b/P 86/51, pulse 88. O2 sat on room air 100% . Patient awake and alert. 0 uf removed as ordered .

## 2024-01-19 NOTE — SUBJECTIVE & OBJECTIVE
Interval History: s/p micra placement with EP 1/16. Pacer set to 60 - intrinsi rhythm improving with less needd for pacing. She feels better.   S/p L IR chest tube placement, 1800 cc output, serosanguinous. 570 cc from R CT. CXR looks clear.   Ate better yesterday   For HD trial today      Medications:  Continuous Infusions:   sodium chloride 0.9% 10 mL/hr at 01/16/24 1200     Scheduled Meds:   droNABinol  5 mg Oral BID    epoetin noble (PROCRIT) injection  4,000 Units Intravenous Every Mon, Wed, Fri    insulin detemir U-100  6 Units Subcutaneous Daily    LIDOcaine  1 patch Transdermal Q24H    meropenem (MERREM) IVPB  500 mg Intravenous Q12H    oxybutynin  5 mg Oral TID    pantoprazole  40 mg Oral Daily    polyethylene glycol  17 g Oral Daily    senna-docusate 8.6-50 mg  1 tablet Oral Daily     PRN Meds:sodium chloride 0.9%, albuterol sulfate, bisacodyL, dextrose 10%, dextrose 10%, glucagon (human recombinant), glucose, glucose, heparin (porcine), hydrALAZINE, insulin aspart U-100, ondansetron, sodium chloride 0.9%     Review of patient's allergies indicates:   Allergen Reactions    Crestor [rosuvastatin] Swelling    Gluten protein      Objective:     Vital Signs (Most Recent):  Temp: 98 °F (36.7 °C) (01/19/24 0701)  Pulse: 88 (01/19/24 0900)  Resp: (!) 35 (01/19/24 0900)  BP: (!) 98/54 (01/19/24 0900)  SpO2: 96 % (01/19/24 0900) Vital Signs (24h Range):  Temp:  [97.3 °F (36.3 °C)-98 °F (36.7 °C)] 98 °F (36.7 °C)  Pulse:  [75-94] 88  Resp:  [5-43] 35  SpO2:  [88 %-100 %] 96 %  BP: ()/(51-70) 98/54     Intake/Output - Last 3 Shifts         01/17 0700 01/18 0659 01/18 0700 01/19 0659 01/19 0700 01/20 0659    I.V. (mL/kg) 1354.3 (17.6) 1045 (13.6)     IV Piggyback 199.6 247.6     Total Intake(mL/kg) 1553.9 (20.2) 1292.6 (16.8)     Other 2621 1043     Chest Tube 1460 2370 45    Total Output 4081 3413 45    Net -2527.1 -2120.4 -45                   SpO2: 96 %        Physical Exam  Vitals and nursing note  reviewed.   Constitutional:       Appearance: Normal appearance.   HENT:      Head: Normocephalic and atraumatic.      Nose: Nose normal.   Cardiovascular:      Rate and Rhythm: Normal rate and regular rhythm.      Pulses: Normal pulses.   Pulmonary:      Effort: Pulmonary effort is normal. No respiratory distress.      Comments: Right posterior chest incision c/d/I    Right chest permacath    Right Chest tube, 10 fr, serosanguinous output, waterseal   Left chest tube, 10 fr, serosanguinous output, waterseal   Abdominal:      General: Abdomen is flat. There is no distension.      Palpations: Abdomen is soft.      Tenderness: There is no abdominal tenderness.      Comments: Soft, nontender abdomen   Musculoskeletal:      Right lower leg: Edema present.      Left lower leg: Edema present.   Skin:     General: Skin is warm and dry.      Coloration: Skin is pale.      Comments: Groin soft, dressings c/d/i   Neurological:      General: No focal deficit present.      Mental Status: She is alert.            Significant Labs:  CBC:   Recent Labs   Lab 01/19/24  0318   WBC 23.34*   RBC 2.46*   HGB 7.2*   HCT 22.5*      MCV 92   MCH 29.3   MCHC 32.0         Significant Diagnostics:  I have reviewed and interpreted all pertinent imaging results/findings within the past 24 hours.    VTE Risk Mitigation (From admission, onward)           Ordered     heparin 25,000 units in dextrose 5% 250 mL (100 units/mL) infusion LOW INTENSITY nomogram - [facility]  Continuous        Question:  Begin at (units/kg/hr)  Answer:  16    01/15/24 1038     heparin (porcine) injection 1,000 Units  As needed (PRN)         01/11/24 1351     heparin (porcine) injection 1,000 Units  As needed (PRN)         01/03/24 1123     IP VTE HIGH RISK PATIENT  Once         01/02/24 1226     Place CARMELITA hose  Until discontinued         01/02/24 1226     Place sequential compression device  Until discontinued         01/02/24 1226

## 2024-01-19 NOTE — PROGRESS NOTES
Bedside Hd initated, good blood flow noted. /. Uf goal net 0. B/P 100/59, pulse 84, 02 sat 94$ on room air. Patient alert  and stable

## 2024-01-19 NOTE — PROGRESS NOTES
[facility]  Cardiac Electrophysiology  Progress Note    Admission Date: 1/2/2024  Code Status: Full Code   Attending Physician: Tan Thomson MD   Expected Discharge Date: 1/19/2024  Principal Problem:Malignant carcinoid tumor of bronchus    Subjective:     Interval History:  Tele with sinus rhythm, bradycardic episodes to backup pacing rate. Micra set to VVI 60 while possibly getting further procedures.     Review of Systems   Constitutional: Negative for diaphoresis and fever.   Cardiovascular:  Negative for chest pain, dyspnea on exertion, leg swelling, near-syncope, orthopnea, palpitations, paroxysmal nocturnal dyspnea and syncope.   Respiratory:  Negative for cough and shortness of breath.    Gastrointestinal:  Negative for abdominal pain, diarrhea, nausea and vomiting.   Neurological:  Negative for light-headedness.   Psychiatric/Behavioral:  Negative for altered mental status and substance abuse.      Objective:     Vital Signs (Most Recent):  Temp: 98 °F (36.7 °C) (01/19/24 0701)  Pulse: 88 (01/19/24 0900)  Resp: (!) 35 (01/19/24 0900)  BP: (!) 98/54 (01/19/24 0900)  SpO2: 96 % (01/19/24 0900) Vital Signs (24h Range):  Temp:  [97.3 °F (36.3 °C)-98 °F (36.7 °C)] 98 °F (36.7 °C)  Pulse:  [75-94] 88  Resp:  [5-43] 35  SpO2:  [88 %-100 %] 96 %  BP: ()/(51-70) 98/54     Weight: 77 kg (169 lb 12.1 oz)  Body mass index is 28.25 kg/m².     SpO2: 96 %        Physical Exam  Constitutional:       Appearance: Normal appearance.   Cardiovascular:      Rate and Rhythm: Normal rate and regular rhythm.      Pulses: Normal pulses.      Heart sounds: Normal heart sounds.   Pulmonary:      Effort: Pulmonary effort is normal.      Breath sounds: Normal breath sounds. No wheezing or rales.   Abdominal:      General: Abdomen is flat. There is no distension.      Palpations: Abdomen is soft.      Tenderness: There is no abdominal tenderness.   Musculoskeletal:      Right lower leg: No edema.       Left lower leg: No edema.   Skin:     General: Skin is warm and dry.   Neurological:      Mental Status: She is alert and oriented to person, place, and time. Mental status is at baseline.   Psychiatric:         Mood and Affect: Mood normal.         Behavior: Behavior normal.            Significant Labs: All pertinent lab results from the last 24 hours have been reviewed.    Significant Imaging:  Reviewed  Assessment and Plan:     Atrial fibrillation  #Bradycardia    73yoF with ESRD admitted for bronchial tumor removal with post op course complicated by atrial fibrillation. She had difficult to control rates but further post-operatively, her rates improved so was weaned off rate controlling agents. Overnight 1/11-1/12 and morning of 1/12 she had several bradycardic episodes in the setting of atrial fibrillation, recent digoxin use (reportedly renally dosed, but some high levels with ESRD).    In the setting of asystolic events with several short rounds of compressions (no more than 1 round at a time), TVP was attempted. 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 She is post PTA and stenting of the left innominate vein. This was done at an outside hospital. Micra implantation was decided on due to infection risk and difficult venous anatomy.    EF 60-65% during this admission  Jan 13, 2024 EKG: Sinus rhythm, QRS 76    Recommendations:  - Micra in placed 1/16  - S/p chest tube 1/17, contralateral chest tube 1/18  - When done with procedures can reset her Micra back to VDD possibly with a higher backup rate        Connor M Gillies, MD  Cardiac Electrophysiology  [facility]

## 2024-01-19 NOTE — SUBJECTIVE & OBJECTIVE
Interval History/Significant Events: S/p left sided chest tube placement with IR yesterday, 1.8L output. CXR stable since yesterday afternoon. Satting 100% on RA. Patient currently in NSR in 80's with PPM set to VVI 60. Patient with swelling of bilateral upper extremities. DVT US of all extremities negative yesterday. Pleural cultures NGTD.    Follow-up For: Procedure(s) (LRB):  INSERTION, CARDIAC PACEMAKER, LEADLESS (N/A)    Post-Operative Day: 3 Days Post-Op    Objective:     Vital Signs (Most Recent):  Temp: 98 °F (36.7 °C) (01/19/24 0701)  Pulse: 88 (01/19/24 0900)  Resp: (!) 35 (01/19/24 0900)  BP: (!) 98/54 (01/19/24 0900)  SpO2: 96 % (01/19/24 0900) Vital Signs (24h Range):  Temp:  [97.3 °F (36.3 °C)-98 °F (36.7 °C)] 98 °F (36.7 °C)  Pulse:  [77-94] 88  Resp:  [5-43] 35  SpO2:  [88 %-100 %] 96 %  BP: ()/(51-70) 98/54     Weight: 77 kg (169 lb 12.1 oz)  Body mass index is 28.25 kg/m².      Intake/Output Summary (Last 24 hours) at 1/19/2024 1024  Last data filed at 1/19/2024 0943  Gross per 24 hour   Intake 647.55 ml   Output 2385 ml   Net -1737.45 ml          Physical Exam  Vitals and nursing note reviewed.   Constitutional:       General: She is not in acute distress.     Appearance: Normal appearance. She is normal weight. She is ill-appearing. She is not toxic-appearing.   HENT:      Head: Normocephalic and atraumatic.      Right Ear: External ear normal.      Left Ear: External ear normal.      Nose: Nose normal.      Comments: Nasal cannula in place  Eyes:      General: No scleral icterus.        Right eye: No discharge.         Left eye: No discharge.      Extraocular Movements: Extraocular movements intact.      Conjunctiva/sclera: Conjunctivae normal.   Cardiovascular:      Rate and Rhythm: Normal rate and regular rhythm.   Pulmonary:      Effort: Pulmonary effort is normal. No respiratory distress.      Comments: L chest tube in place  R chest tube in place  Abdominal:      General: Abdomen is  flat. There is no distension.      Palpations: Abdomen is soft. There is no mass.      Tenderness: There is no abdominal tenderness. There is no guarding.      Hernia: No hernia is present.   Genitourinary:     Comments: Groin incisions covered with gauze and Tegaderm bilaterally, no breakthrough bleeding or hematomas noted  Musculoskeletal:         General: Swelling (BUE edema) present. Normal range of motion.      Cervical back: Normal range of motion and neck supple.   Skin:     General: Skin is warm and dry.   Neurological:      General: No focal deficit present.      Mental Status: She is alert and oriented to person, place, and time. Mental status is at baseline.   Psychiatric:         Mood and Affect: Mood normal.         Behavior: Behavior normal.         Thought Content: Thought content normal.            Vents:  Oxygen Concentration (%): 24 (01/15/24 0200)    Lines/Drains/Airways       Central Venous Catheter Line  Duration                  Hemodialysis Catheter 01/25/23 1501 right internal jugular 358 days              Drain  Duration                  Chest Tube 01/17/24 1504 Right Pleural 10 Fr. 1 day         Chest Tube 01/18/24 1537 Left Pleural 10 Fr. <1 day              Peripheral Intravenous Line  Duration                  Hemodialysis AV Fistula 09/01/23 0800 Left forearm 140 days         Peripheral IV - Single Lumen 01/15/24 1230 20 G;1 3/4 in Anterior;Proximal;Right Forearm 3 days                    Significant Labs:    CBC/Anemia Profile:  Recent Labs   Lab 01/18/24  0348 01/19/24  0318   WBC 21.82* 23.34*   HGB 7.2* 7.2*   HCT 22.5* 22.5*    194   MCV 92 92   RDW 20.7* 21.7*        Chemistries:  Recent Labs   Lab 01/18/24  0348 01/18/24  1618 01/18/24  2349 01/19/24  0318     136  138 135* 135* 136  133*   K 4.9  4.9  5.0 4.6 4.7 4.8  4.7     108  109 104 104 107  105   CO2 23  23  21* 24 23 22*  22*   BUN 9  9  10 10 17 19  18   CREATININE 1.0  1.0  1.0 1.0  1.4 1.7*  1.6*   CALCIUM 8.0*  8.0*  8.0* 8.3* 8.2* 8.0*  8.1*   ALBUMIN 2.0*  2.0*  2.0* 2.1* 2.0* 1.9*  1.9*   PROT 4.8*  --   --  4.7*   BILITOT 0.8  --   --  0.7   ALKPHOS 136*  --   --  133   ALT 8*  --   --  12   AST 17  --   --  19   MG 1.8  1.8  --  2.3 2.3   PHOS 2.4*  2.4*  2.4* 2.4* 2.3* 2.7  2.6*       All pertinent labs within the past 24 hours have been reviewed.    Significant Imaging:  I have reviewed all pertinent imaging results/findings within the past 24 hours.

## 2024-01-19 NOTE — ASSESSMENT & PLAN NOTE
#Bradycardia    73yoF with ESRD admitted for bronchial tumor removal with post op course complicated by atrial fibrillation. She had difficult to control rates but further post-operatively, her rates improved so was weaned off rate controlling agents. Overnight 1/11-1/12 and morning of 1/12 she had several bradycardic episodes in the setting of atrial fibrillation, recent digoxin use (reportedly renally dosed, but some high levels with ESRD).    In the setting of asystolic events with several short rounds of compressions (no more than 1 round at a time), TVP was attempted. 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 She is post PTA and stenting of the left innominate vein. This was done at an outside hospital. Micra implantation was decided on due to infection risk and difficult venous anatomy.    EF 60-65% during this admission  Jan 13, 2024 EKG: Sinus rhythm, QRS 76    Recommendations:  - Micra in placed 1/16  - S/p chest tube 1/17, contralateral chest tube 1/18  - When done with procedures can reset her Micra back to VDD possibly with a higher backup rate

## 2024-01-19 NOTE — CARE UPDATE
-Glucose Goal 140-180    -A1C:   Hemoglobin A1C   Date Value Ref Range Status   11/15/2023 4.9 4.0 - 5.6 % Final     Comment:     ADA Screening Guidelines:  5.7-6.4%  Consistent with prediabetes  >or=6.5%  Consistent with diabetes    High levels of fetal hemoglobin interfere with the HbA1C  assay. Heterozygous hemoglobin variants (HbS, HgC, etc)do  not significantly interfere with this assay.   However, presence of multiple variants may affect accuracy.     10/23/2019 6.8 % Final         -HOME REGIMEN: LDC SSI   Blood sugar 150 to 200, + 1 unit  Blood sugar 201 to 250, + 2 units  Blood sugar 251 to 300, + 3 units  Blood sugar 301 to 350, + 4 units   Blood sugar greater than 350, + 5 units      -GLUCOSE TREND FOR THE PAST 24HRS:   Recent Labs   Lab 01/18/24  1617 01/18/24  1907 01/18/24  2354 01/19/24  0319 01/19/24  0843 01/19/24  1134   POCTGLUCOSE 80 121* 157* 155* 155* 181*         -NO HYPOGYCEMIAS NOTED     - Diet  Diet diabetic Soft & Bite Sized (IDDSI Level 6), Gluten Free, Renal; 2000 Calorie; Standard Tray    -NPO? no    -Steroids - none     -Tube Feeds - none         Plan:   -Continue Levemir 6 units daily   - Novolog Mod dose correction with ISF 25 starting at 150 prn  - POCT Glucose ac/hs  - Hypoglycemia protocol in place      ** Please notify Endocrine for any change and/or advance in diet**  ** Please call Endocrine for any BG related issues **     Discharge Planning:   TBD. Please notify endocrinology prior to discharge.

## 2024-01-19 NOTE — SUBJECTIVE & OBJECTIVE
Interval History:  Tele with sinus rhythm, bradycardic episodes to backup pacing rate. Micra set to VVI 60 while possibly getting further procedures.     Review of Systems   Constitutional: Negative for diaphoresis and fever.   Cardiovascular:  Negative for chest pain, dyspnea on exertion, leg swelling, near-syncope, orthopnea, palpitations, paroxysmal nocturnal dyspnea and syncope.   Respiratory:  Negative for cough and shortness of breath.    Gastrointestinal:  Negative for abdominal pain, diarrhea, nausea and vomiting.   Neurological:  Negative for light-headedness.   Psychiatric/Behavioral:  Negative for altered mental status and substance abuse.      Objective:     Vital Signs (Most Recent):  Temp: 98 °F (36.7 °C) (01/19/24 0701)  Pulse: 88 (01/19/24 0900)  Resp: (!) 35 (01/19/24 0900)  BP: (!) 98/54 (01/19/24 0900)  SpO2: 96 % (01/19/24 0900) Vital Signs (24h Range):  Temp:  [97.3 °F (36.3 °C)-98 °F (36.7 °C)] 98 °F (36.7 °C)  Pulse:  [75-94] 88  Resp:  [5-43] 35  SpO2:  [88 %-100 %] 96 %  BP: ()/(51-70) 98/54     Weight: 77 kg (169 lb 12.1 oz)  Body mass index is 28.25 kg/m².     SpO2: 96 %        Physical Exam  Constitutional:       Appearance: Normal appearance.   Cardiovascular:      Rate and Rhythm: Normal rate and regular rhythm.      Pulses: Normal pulses.      Heart sounds: Normal heart sounds.   Pulmonary:      Effort: Pulmonary effort is normal.      Breath sounds: Normal breath sounds. No wheezing or rales.   Abdominal:      General: Abdomen is flat. There is no distension.      Palpations: Abdomen is soft.      Tenderness: There is no abdominal tenderness.   Musculoskeletal:      Right lower leg: No edema.      Left lower leg: No edema.   Skin:     General: Skin is warm and dry.   Neurological:      Mental Status: She is alert and oriented to person, place, and time. Mental status is at baseline.   Psychiatric:         Mood and Affect: Mood normal.         Behavior: Behavior normal.             Significant Labs: All pertinent lab results from the last 24 hours have been reviewed.    Significant Imaging:  Reviewed

## 2024-01-19 NOTE — SUBJECTIVE & OBJECTIVE
"Interval HPI:   No acute events overnight. Patient in room 39108 CVICU/77471 CVICU A. Blood glucose stable. BG at goal on current insulin regimen (SSI and basal). Steroid use- None.   3 Days Post-Op  Renal function- Abnormal - cr 1.7    Vasopressors-  None     Diet diabetic Soft & Bite Sized (IDDSI Level 6), Gluten Free, Renal; 2000 Calorie; Standard Tray     Eatin%  Nausea: No  Hypoglycemia and intervention: No  Fever: No  TPN and/or TF: No      BP (!) 85/51   Pulse 94   Temp 98 °F (36.7 °C) (Oral)   Resp 16   Ht 5' 5" (1.651 m)   Wt 77 kg (169 lb 12.1 oz)   LMP  (LMP Unknown) Comment: BEAU/ NO BSO  1986  SpO2 98%   Breastfeeding No   BMI 28.25 kg/m²     Labs Reviewed and Include    Recent Labs   Lab 24  0318   *  138*   CALCIUM 8.0*  8.1*   ALBUMIN 1.9*  1.9*   PROT 4.7*     133*   K 4.8  4.7   CO2 22*  22*     105   BUN 19  18   CREATININE 1.7*  1.6*   ALKPHOS 133   ALT 12   AST 19   BILITOT 0.7     Lab Results   Component Value Date    WBC 23.34 (H) 2024    HGB 7.2 (L) 2024    HCT 22.5 (L) 2024    MCV 92 2024     2024     No results for input(s): "TSH", "FREET4" in the last 168 hours.  Lab Results   Component Value Date    HGBA1C 4.9 11/15/2023       Nutritional status:   Body mass index is 28.25 kg/m².  Lab Results   Component Value Date    ALBUMIN 1.9 (L) 2024    ALBUMIN 1.9 (L) 2024    ALBUMIN 2.0 (L) 2024     No results found for: "PREALBUMIN"    Estimated Creatinine Clearance: 30.2 mL/min (A) (based on SCr of 1.7 mg/dL (H)).    Accu-Checks  Recent Labs     24  1923 24  2221 24  0349 24  0826 24  1241 24  1617 24  1907 24  2354 24  0319 24  0843   POCTGLUCOSE 100 122* 103 103 88 80 121* 157* 155* 155*       Current Medications and/or Treatments Impacting Glycemic Control  Immunotherapy:    Immunosuppressants       None          Steroids: "   Hormones (From admission, onward)      None          Pressors:    Autonomic Drugs (From admission, onward)      Start     Stop Route Frequency Ordered    01/19/24 1132  midodrine tablet 10 mg         -- Oral As needed (PRN) 01/19/24 1032          Hyperglycemia/Diabetes Medications:   Antihyperglycemics (From admission, onward)      Start     Stop Route Frequency Ordered    01/19/24 0900  insulin detemir U-100 (Levemir) pen 6 Units         -- SubQ Daily 01/18/24 1520    01/11/24 0824  insulin aspart U-100 pen 0-10 Units         -- SubQ Every 4 hours PRN 01/11/24 0724

## 2024-01-19 NOTE — SUBJECTIVE & OBJECTIVE
"Interval History: "Not good". Right sided pleural effusion of unknown etiology. On broad spectrum antibiotics. Underwent PPM o 1/16. Bradycardia episodes overnight. RIght sided chest tube placement on 1/17. S/P left sided chest tube on 1/18.    Review of Systems   Constitutional:  Positive for fatigue. Negative for chills, fever and unexpected weight change.   HENT:  Negative for ear pain, facial swelling, hearing loss, mouth sores, nosebleeds, rhinorrhea, sinus pressure, sore throat, tinnitus, trouble swallowing and voice change.    Eyes:  Negative for photophobia, pain, redness and visual disturbance.   Respiratory:  Positive for cough and shortness of breath. Negative for chest tightness and wheezing.    Cardiovascular:  Negative for chest pain, palpitations and leg swelling.   Gastrointestinal:  Positive for nausea. Negative for abdominal pain, blood in stool, constipation, diarrhea and vomiting.   Endocrine: Negative for cold intolerance, heat intolerance, polydipsia, polyphagia and polyuria.   Genitourinary:  Negative for decreased urine volume, dysuria, flank pain, frequency, hematuria, menstrual problem, urgency, vaginal bleeding, vaginal discharge and vaginal pain.   Musculoskeletal:  Negative for arthralgias, back pain, joint swelling, myalgias and neck pain.   Skin:  Negative for rash.   Allergic/Immunologic: Negative for environmental allergies, food allergies and immunocompromised state.   Neurological:  Negative for dizziness, seizures, syncope, weakness, light-headedness, numbness and headaches.   Hematological:  Negative for adenopathy. Does not bruise/bleed easily.   Psychiatric/Behavioral:  Negative for confusion, hallucinations, self-injury, sleep disturbance and suicidal ideas. The patient is not nervous/anxious.      Objective:     Vital Signs (Most Recent):  Temp: 97.5 °F (36.4 °C) (01/19/24 1130)  Pulse: 88 (01/19/24 1130)  Resp: (!) 24 (01/19/24 1130)  BP: (!) 86/53 (01/19/24 1100)  SpO2: 99 " % (01/19/24 1130) Vital Signs (24h Range):  Temp:  [97.3 °F (36.3 °C)-98 °F (36.7 °C)] 97.5 °F (36.4 °C)  Pulse:  [77-94] 88  Resp:  [15-43] 24  SpO2:  [88 %-100 %] 99 %  BP: ()/(51-70) 86/53     Weight: 77 kg (169 lb 12.1 oz)  Body mass index is 28.25 kg/m².    Estimated Creatinine Clearance: 30.2 mL/min (A) (based on SCr of 1.7 mg/dL (H)).     Physical Exam  Vitals and nursing note reviewed.   Constitutional:       Appearance: She is well-developed. She is ill-appearing.   HENT:      Head: Normocephalic and atraumatic.      Right Ear: External ear normal.      Left Ear: External ear normal.   Eyes:      General: No scleral icterus.        Right eye: No discharge.         Left eye: No discharge.      Conjunctiva/sclera: Conjunctivae normal.   Cardiovascular:      Rate and Rhythm: Normal rate and regular rhythm.      Heart sounds: Normal heart sounds. No murmur heard.     No friction rub. No gallop.   Pulmonary:      Effort: Pulmonary effort is normal. No respiratory distress.      Breath sounds: No stridor. Examination of the right-middle field reveals rales. Examination of the right-lower field reveals rales. Examination of the left-lower field reveals rales. Rales present. No decreased breath sounds or wheezing.   Abdominal:      General: Bowel sounds are normal.   Musculoskeletal:         General: No tenderness.      Right lower leg: Edema present.      Left lower leg: Edema present.   Skin:     General: Skin is warm and dry.   Neurological:      Mental Status: She is alert and oriented to person, place, and time.          Significant Labs: BMP:   Recent Labs   Lab 01/19/24  0318   *  138*     133*   K 4.8  4.7     105   CO2 22*  22*   BUN 19 18   CREATININE 1.7*  1.6*   CALCIUM 8.0*  8.1*   MG 2.3       CBC:   Recent Labs   Lab 01/18/24  0348 01/19/24  0318   WBC 21.82* 23.34*   HGB 7.2* 7.2*   HCT 22.5* 22.5*    194       Microbiology Results (last 7 days)        Procedure Component Value Units Date/Time    Culture, body fluid - Bactec [[id number]] Collected: 01/18/24 1537    Order Status: Completed Specimen: Body Fluid from Thoracentesis Fluid Updated: 01/19/24 0115     Body Fluid Culture, Sterile No Growth to date    Narrative:      IR procedure 1/18 L lung    AFB culture [[id number]] Collected: 01/17/24 1555    Order Status: Completed Specimen: Body Fluid from Pleural Fluid Updated: 01/18/24 2128     AFB Culture & Smear Culture in progress     AFB CULTURE STAIN No acid fast bacilli seen.    AFB stain [[id number]] Collected: 01/18/24 1538    Order Status: Completed Specimen: Body Fluid from Pleural Fluid Updated: 01/18/24 2048     Direct Acid Fast No acid fast bacilli seen.    Narrative:      IR procedure 1/18 L lung    Gram stain [[id number]] Collected: 01/18/24 1538    Order Status: Completed Specimen: Body Fluid from Pleural Fluid Updated: 01/18/24 2034     Gram Stain Result Rare WBC's      No organisms seen    Narrative:      IR procedure 1/18 L lung    Culture, body fluid - Bactec [[id number]] Collected: 01/17/24 1555    Order Status: Completed Specimen: Body Fluid from Pleural Fluid Updated: 01/18/24 2012     Body Fluid Culture, Sterile No Growth to date      No Growth to date    KOH prep [[id number]] Collected: 01/18/24 1538    Order Status: Completed Specimen: Body Fluid from Pleural Fluid Updated: 01/18/24 1932     KOH Prep No yeast or fungal elements seen    Narrative:      IR procedure 1/18 L lung    AFB culture [[id number]] Collected: 01/18/24 1538    Order Status: Sent Specimen: Body Fluid from Pleural Fluid Updated: 01/18/24 1652    Fungus culture [[id number]] Collected: 01/18/24 1538    Order Status: Sent Specimen: Body Fluid from Pleural Fluid Updated: 01/18/24 1652    AFB stain [[id number]] Collected: 01/17/24 1555    Order Status: Completed Specimen: Body Fluid from Pleural Fluid Updated: 01/17/24 2012     Direct Acid Fast No acid fast bacilli seen.     Gram stain [[id number]] Collected: 01/17/24 1555    Order Status: Completed Specimen: Body Fluid from Pleural Fluid Updated: 01/17/24 1848     Gram Stain Result No WBC's      No organisms seen    JADEN prep [[id number]] Collected: 01/17/24 1555    Order Status: Completed Specimen: Body Fluid from Pleural Fluid Updated: 01/17/24 1836     KOH Prep No yeast or fungal elements seen    Fungus culture [[id number]] Collected: 01/17/24 1555    Order Status: Sent Specimen: Body Fluid from Pleural Fluid Updated: 01/17/24 1639    Blood culture [[id number]] Collected: 01/12/24 0411    Order Status: Completed Specimen: Blood from Peripheral, Foot, Left Updated: 01/17/24 0812     Blood Culture, Routine No growth after 5 days.    Culture, Respiratory with Gram Stain [[id number]]  (Abnormal)  (Susceptibility) Collected: 01/11/24 2030    Order Status: Completed Specimen: Sputum, Induced Updated: 01/15/24 0925     Respiratory Culture No S aureus isolated.      KLEBSIELLA PNEUMONIAE  Many        ESCHERICHIA COLI  Many  Normal respiratory ramu also present       Gram Stain (Respiratory) <10 epithelial cells per low power field.     Gram Stain (Respiratory) Moderate WBC's     Gram Stain (Respiratory) Many Gram negative rods     Gram Stain (Respiratory) Rare Gram positive cocci    Blood culture [[id number]]  (Abnormal) Collected: 01/11/24 1601    Order Status: Completed Specimen: Blood from Peripheral, Antecubital, Right Updated: 01/14/24 0812     Blood Culture, Routine Gram stain shahriar bottle: Gram positive cocci in clusters resembling Staph      Results called to and read back by: Gena UNGER RN 01/12/2024  18:43      COAGULASE-NEGATIVE STAPHYLOCOCCUS SPECIES  Organism is a probable contaminant      Culture, MRSA [[id number]] Collected: 01/11/24 1836    Order Status: Completed Specimen: MRSA source from Nares, Right Updated: 01/13/24 0844     MRSA Surveillance Screen No MRSA isolated    Rapid Organism ID by PCR (from Blood culture)  [[id number]]  (Abnormal) Collected: 01/11/24 1601    Order Status: Completed Updated: 01/12/24 1956     Enterococcus faecalis Not Detected     Enterococcus faecium Not Detected     Listeria monocytogenes Not Detected     Staphylococcus spp. See species for ID     Staphylococcus aureus Not Detected     Staphylococcus epidermidis Detected     Staphylococcus lugdunensis Not Detected     Streptococcus species Not Detected     Streptococcus agalactiae Not Detected     Streptococcus pneumoniae Not Detected     Streptococcus pyogenes Not Detected     Acinetobacter calcoaceticus/baumannii complex Not Detected     Bacteroides fragilis Not Detected     Enterobacterales Not Detected     Enterobacter cloacae complex Not Detected     Escherichia coli Not Detected     Klebsiella aerogenes Not Detected     Klebsiella oxytoca Not Detected     Klebsiella pneumoniae group Not Detected     Proteus Not Detected     Salmonella sp Not Detected     Serratia marcescens Not Detected     Haemophilus influenzae Not Detected     Neisseria meningtidis Not Detected     Pseudomonas aeruginosa Not Detected     Stenotrophomonas maltophilia Not Detected     Candida albicans Not Detected     Candida auris Not Detected     Candida glabrata Not Detected     Candida krusei Not Detected     Candida parapsilosis Not Detected     Candida tropicalis Not Detected     Cryptococcus neoformans/gattii Not Detected     CTX-M (ESBL ) Test Not Applicable     IMP (Carbapenem resistant) Test Not Applicable     KPC resistance gene (Carbapenem resistant) Test Not Applicable     mcr-1  Test Not Applicable     mec A/C  Not Detected     mec A/C and MREJ (MRSA) gene Test Not Applicable     NDM (Carbapenem resistant) Test Not Applicable     OXA-48-like (Carbapenem resistant) Test Not Applicable     van A/B (VRE gene) Test Not Applicable     VIM (Carbapenem resistant) Test Not Applicable            Significant Imaging: I have reviewed all pertinent imaging  results/findings within the past 24 hours.

## 2024-01-19 NOTE — SUBJECTIVE & OBJECTIVE
"Interval History: "So so". Right sided pleural effusion of unknown etiology. On broad spectrum antibiotics. Underwent PPM o 1/16. Bradycardia episodes overnight. RIght sided chest tube placement on 1/17. Pending chest tube/thoracentesis on the left today..    Review of Systems   Constitutional:  Positive for fatigue. Negative for chills, fever and unexpected weight change.   HENT:  Negative for ear pain, facial swelling, hearing loss, mouth sores, nosebleeds, rhinorrhea, sinus pressure, sore throat, tinnitus, trouble swallowing and voice change.    Eyes:  Negative for photophobia, pain, redness and visual disturbance.   Respiratory:  Positive for cough and shortness of breath. Negative for chest tightness and wheezing.    Cardiovascular:  Negative for chest pain, palpitations and leg swelling.   Gastrointestinal:  Negative for abdominal pain, blood in stool, constipation, diarrhea, nausea and vomiting.   Endocrine: Negative for cold intolerance, heat intolerance, polydipsia, polyphagia and polyuria.   Genitourinary:  Negative for decreased urine volume, dysuria, flank pain, frequency, hematuria, menstrual problem, urgency, vaginal bleeding, vaginal discharge and vaginal pain.   Musculoskeletal:  Negative for arthralgias, back pain, joint swelling, myalgias and neck pain.   Skin:  Negative for rash.   Allergic/Immunologic: Negative for environmental allergies, food allergies and immunocompromised state.   Neurological:  Negative for dizziness, seizures, syncope, weakness, light-headedness, numbness and headaches.   Hematological:  Negative for adenopathy. Does not bruise/bleed easily.   Psychiatric/Behavioral:  Negative for confusion, hallucinations, self-injury, sleep disturbance and suicidal ideas. The patient is not nervous/anxious.      Objective:     Vital Signs (Most Recent):  Temp: 97.3 °F (36.3 °C) (01/18/24 1900)  Pulse: 89 (01/18/24 1900)  Resp: (!) 33 (01/18/24 1900)  BP: (!) 92/51 (01/18/24 1900)  SpO2: " 100 % (01/18/24 1900) Vital Signs (24h Range):  Temp:  [97.3 °F (36.3 °C)-98 °F (36.7 °C)] 97.3 °F (36.3 °C)  Pulse:  [69-89] 89  Resp:  [5-43] 33  SpO2:  [88 %-100 %] 100 %  BP: ()/(51-81) 92/51     Weight: 77 kg (169 lb 12.1 oz)  Body mass index is 28.25 kg/m².    Estimated Creatinine Clearance: 51.4 mL/min (based on SCr of 1 mg/dL).     Physical Exam  Vitals and nursing note reviewed.   Constitutional:       Appearance: She is well-developed. She is ill-appearing.   HENT:      Head: Normocephalic and atraumatic.      Right Ear: External ear normal.      Left Ear: External ear normal.   Eyes:      General: No scleral icterus.        Right eye: No discharge.         Left eye: No discharge.      Conjunctiva/sclera: Conjunctivae normal.   Cardiovascular:      Rate and Rhythm: Normal rate and regular rhythm.      Heart sounds: Normal heart sounds. No murmur heard.     No friction rub. No gallop.   Pulmonary:      Effort: Pulmonary effort is normal. No respiratory distress.      Breath sounds: No stridor. Examination of the right-lower field reveals decreased breath sounds. Decreased breath sounds present. No wheezing or rales.   Abdominal:      General: Bowel sounds are normal.   Musculoskeletal:         General: No tenderness.      Right lower leg: Edema present.      Left lower leg: Edema present.   Skin:     General: Skin is warm and dry.   Neurological:      Mental Status: She is alert and oriented to person, place, and time.          Significant Labs: BMP:   Recent Labs   Lab 01/18/24  0348 01/18/24  1618   GLU 99  99  99 80     136  138 135*   K 4.9  4.9  5.0 4.6     108  109 104   CO2 23  23  21* 24   BUN 9  9  10 10   CREATININE 1.0  1.0  1.0 1.0   CALCIUM 8.0*  8.0*  8.0* 8.3*   MG 1.8  1.8  --        CBC:   Recent Labs   Lab 01/17/24  0316 01/18/24  0348   WBC 23.53* 21.82*   HGB 7.6* 7.2*   HCT 23.2* 22.5*    169       Microbiology Results (last 7 days)        Procedure Component Value Units Date/Time    JADEN prep [[id number]] Collected: 01/18/24 1538    Order Status: Completed Specimen: Body Fluid from Pleural Fluid Updated: 01/18/24 1932     KOH Prep No yeast or fungal elements seen    Narrative:      IR procedure 1/18 L lung    AFB culture [[id number]] Collected: 01/18/24 1538    Order Status: Sent Specimen: Body Fluid from Pleural Fluid Updated: 01/18/24 1652    Fungus culture [[id number]] Collected: 01/18/24 1538    Order Status: Sent Specimen: Body Fluid from Pleural Fluid Updated: 01/18/24 1652    AFB stain [[id number]] Collected: 01/18/24 1538    Order Status: Sent Specimen: Body Fluid from Pleural Fluid Updated: 01/18/24 1652    Gram stain [[id number]] Collected: 01/18/24 1538    Order Status: Sent Specimen: Body Fluid from Pleural Fluid Updated: 01/18/24 1652    Culture, body fluid - Bactec [[id number]] Collected: 01/18/24 1537    Order Status: Sent Specimen: Body Fluid from Thoracentesis Fluid Updated: 01/18/24 1651    AFB culture [[id number]] Collected: 01/17/24 1555    Order Status: Completed Specimen: Body Fluid from Pleural Fluid Updated: 01/18/24 1415     AFB CULTURE STAIN No acid fast bacilli seen.    Culture, body fluid - Bactec [[id number]] Collected: 01/17/24 1555    Order Status: Completed Specimen: Body Fluid from Pleural Fluid Updated: 01/18/24 0115     Body Fluid Culture, Sterile No Growth to date    AFB stain [[id number]] Collected: 01/17/24 1555    Order Status: Completed Specimen: Body Fluid from Pleural Fluid Updated: 01/17/24 2012     Direct Acid Fast No acid fast bacilli seen.    Gram stain [[id number]] Collected: 01/17/24 1555    Order Status: Completed Specimen: Body Fluid from Pleural Fluid Updated: 01/17/24 1848     Gram Stain Result No WBC's      No organisms seen    JADEN prep [[id number]] Collected: 01/17/24 1555    Order Status: Completed Specimen: Body Fluid from Pleural Fluid Updated: 01/17/24 1836     KOH Prep No yeast or fungal  elements seen    Fungus culture [[id number]] Collected: 01/17/24 1555    Order Status: Sent Specimen: Body Fluid from Pleural Fluid Updated: 01/17/24 1639    Blood culture [[id number]] Collected: 01/12/24 0411    Order Status: Completed Specimen: Blood from Peripheral, Foot, Left Updated: 01/17/24 0812     Blood Culture, Routine No growth after 5 days.    Culture, Respiratory with Gram Stain [[id number]]  (Abnormal)  (Susceptibility) Collected: 01/11/24 2030    Order Status: Completed Specimen: Sputum, Induced Updated: 01/15/24 0925     Respiratory Culture No S aureus isolated.      KLEBSIELLA PNEUMONIAE  Many        ESCHERICHIA COLI  Many  Normal respiratory ramu also present       Gram Stain (Respiratory) <10 epithelial cells per low power field.     Gram Stain (Respiratory) Moderate WBC's     Gram Stain (Respiratory) Many Gram negative rods     Gram Stain (Respiratory) Rare Gram positive cocci    Blood culture [[id number]]  (Abnormal) Collected: 01/11/24 1601    Order Status: Completed Specimen: Blood from Peripheral, Antecubital, Right Updated: 01/14/24 0812     Blood Culture, Routine Gram stain shahriar bottle: Gram positive cocci in clusters resembling Staph      Results called to and read back by: Gena UNGER RN 01/12/2024  18:43      COAGULASE-NEGATIVE STAPHYLOCOCCUS SPECIES  Organism is a probable contaminant      Culture, MRSA [[id number]] Collected: 01/11/24 1836    Order Status: Completed Specimen: MRSA source from Nares, Right Updated: 01/13/24 0844     MRSA Surveillance Screen No MRSA isolated    Rapid Organism ID by PCR (from Blood culture) [[id number]]  (Abnormal) Collected: 01/11/24 1601    Order Status: Completed Updated: 01/12/24 1956     Enterococcus faecalis Not Detected     Enterococcus faecium Not Detected     Listeria monocytogenes Not Detected     Staphylococcus spp. See species for ID     Staphylococcus aureus Not Detected     Staphylococcus epidermidis Detected     Staphylococcus lugdunensis  Not Detected     Streptococcus species Not Detected     Streptococcus agalactiae Not Detected     Streptococcus pneumoniae Not Detected     Streptococcus pyogenes Not Detected     Acinetobacter calcoaceticus/baumannii complex Not Detected     Bacteroides fragilis Not Detected     Enterobacterales Not Detected     Enterobacter cloacae complex Not Detected     Escherichia coli Not Detected     Klebsiella aerogenes Not Detected     Klebsiella oxytoca Not Detected     Klebsiella pneumoniae group Not Detected     Proteus Not Detected     Salmonella sp Not Detected     Serratia marcescens Not Detected     Haemophilus influenzae Not Detected     Neisseria meningtidis Not Detected     Pseudomonas aeruginosa Not Detected     Stenotrophomonas maltophilia Not Detected     Candida albicans Not Detected     Candida auris Not Detected     Candida glabrata Not Detected     Candida krusei Not Detected     Candida parapsilosis Not Detected     Candida tropicalis Not Detected     Cryptococcus neoformans/gattii Not Detected     CTX-M (ESBL ) Test Not Applicable     IMP (Carbapenem resistant) Test Not Applicable     KPC resistance gene (Carbapenem resistant) Test Not Applicable     mcr-1  Test Not Applicable     mec A/C  Not Detected     mec A/C and MREJ (MRSA) gene Test Not Applicable     NDM (Carbapenem resistant) Test Not Applicable     OXA-48-like (Carbapenem resistant) Test Not Applicable     van A/B (VRE gene) Test Not Applicable     VIM (Carbapenem resistant) Test Not Applicable            Significant Imaging: I have reviewed all pertinent imaging results/findings within the past 24 hours.

## 2024-01-19 NOTE — ASSESSMENT & PLAN NOTE
"I have reviewed hospital notes from  SIC service and other specialty providers. I have also reviewed CBC, CMP/BMP,  cultures and imaging with my interpretation as documented.     Admitted for carcinoid tumor lymphadenectomy,  robotic-assisted surgery converted to right thoracotomy with therapeutic wedge resection and open mediastinal lymph node dissection on 1/2. Hospital course complicated by Afib with RVR, ileus managed with NGT which has since been removed, and tachy-eulogio syndrome requiring temporary pacemaker. Now with multi loculated pleural effusion presumptively parapneumonic in etiology. However, diagnostic work up to assess whether effusion is infectious or non infectious not yet performed.     Re-consulted on 1/16 for "OK" to proceed with pacemaker placement. S/P PPM on 1/16. Afebrile and with down trending leukocytosis. Gram stain from chest tube on the right without organisms. Pending chest tube/thoracentesis on the left on 1/18.    Can continue meropenem for now.  Please send pleural effusion aspirate for cell count, gram stain, culture, AFB stain and culture, as well as pathology.  Discussed with patient and family at bedside.   Discussed management plan with the staff and/or members from SICU service.  "

## 2024-01-19 NOTE — PT/OT/SLP PROGRESS
"Occupational Therapy   Treatment    Name: Lily Green  MRN: [id number]  Admitting Diagnosis:  Malignant carcinoid tumor of bronchus  3 Days Post-Op    Recommendations:     Discharge Recommendations: Moderate Intensity Therapy  Discharge Equipment Recommendations:  none  Barriers to discharge:  None    Assessment:     Lily Green is a 73 y.o. female with a medical diagnosis of Malignant carcinoid tumor of bronchus.  She presents with thoracotomy. Performance deficits affecting function are weakness, impaired endurance, impaired self care skills, impaired functional mobility, impaired balance, impaired cardiopulmonary response to activity. Pt was able to perform supine/sit T/F c max A.  Pt was able to tolerate sitting EOB for approx. 15 minutes c CGA.  Pt c SOB upon sitting EOB and O2 SATs were 92-98 throughout tx session.  She did become hypotensive and nauseous while sitting EOB and BP found to be 85/51 c a MAP of 62.  Pt returned to supine.    Rehab Prognosis:  Good; patient would benefit from acute skilled OT services to address these deficits and reach maximum level of function.       Plan:     Patient to be seen 3 x/week to address the above listed problems via self-care/home management, therapeutic activities, therapeutic exercises  Plan of Care Expires: 02/02/24  Plan of Care Reviewed with: patient, friend    Subjective     Chief Complaint: Thoracotomy  Patient/Family Comments/goals: "I'm going to throw up."  Pain/Comfort:  Pain Rating 1: 0/10    Objective:     Communicated with: RN prior to session.  Patient found supine with blood pressure cuff, chest tube, peripheral IV, pulse ox (continuous), telemetry, Trialysis upon OT entry to room.    General Precautions: Standard, fall    Orthopedic Precautions:N/A  Braces: N/A  Respiratory Status: Room air     Occupational Performance:     Bed Mobility:    Patient completed Supine to Sit with maximal assistance  Patient completed Sit to Supine " with maximal assistance     Functional Mobility/Transfers:    Functional Mobility: NT d/t nausea and hypotension.  Was able to tolerate sitting EOB for approx. 15 minutes.    Activities of Daily Living:  Grooming: supervision to wash off face while sitting EOB.  Upper Body Dressing: maximal assistance to don hospital gown.      [facility] 6 Click ADL: 15    Patient left supine with all lines intact, call button in reach, and RN notified    GOALS:   Multidisciplinary Problems       Occupational Therapy Goals          Problem: Occupational Therapy    Goal Priority Disciplines Outcome Interventions   Occupational Therapy Goal     OT, PT/OT Ongoing, Progressing    Description: Goals to be met by: 02/2/24     Patient will increase functional independence with ADLs by performing:    UE Dressing with Supervision.  Grooming while standing at sink with Modified [place].  Toileting from toilet with Modified [place] for hygiene and clothing management.   Supine to sit with Supervision.  Toilet transfer to toilet with Supervision.                         Time Tracking:     OT Date of Treatment: 01/19/24  OT Start Time: 0856  OT Stop Time: 0936  OT Total Time (min): 40 min    Billable Minutes:Self Care/Home Management 10  Therapeutic Activity 30               1/19/2024

## 2024-01-19 NOTE — PROGRESS NOTES
"[facility]  Infectious Disease  Progress Note    Patient Name: Lily Green  MRN: [id number]  Admission Date: 1/2/2024  Length of Stay: 17 days  Attending Physician: Tan Thomson MD  Primary Care Provider: Pavel Calixto MD    Isolation Status: No active isolations  Assessment/Plan:      Pulmonary  Parapneumonic effusion  I have reviewed hospital notes from  SIC service and other specialty providers. I have also reviewed CBC, CMP/BMP,  cultures and imaging with my interpretation as documented.     Admitted for carcinoid tumor lymphadenectomy,  robotic-assisted surgery converted to right thoracotomy with therapeutic wedge resection and open mediastinal lymph node dissection on 1/2. Hospital course complicated by Afib with RVR, ileus managed with NGT which has since been removed, and tachy-eulogio syndrome requiring temporary pacemaker. Now with multi loculated pleural effusion presumptively parapneumonic in etiology. However, diagnostic work up to assess whether effusion is infectious or non infectious not yet performed.     Re-consulted on 1/16 for "OK" to proceed with pacemaker placement. S/P PPM on 1/16. Afebrile and with down trending leukocytosis. Gram stain from chest tube on the right without organisms. S/P left sided chest tube on 1/18.  Cultures NGTD. Pathology from right sided chest tube without evidence of malignancy.  Continue meropenem until 1/20/24.  Start ceftriaxone 2 gm IV daily plus PO metronidazole 500 mg BID on 1/21/24.  Will follow up cultures and pahtology.  Discussed with patient and family at bedside.   Discussed management plan with the staff and/or members from SICU service.        Anticipated Disposition: per primary    Thank you for your consult. I will follow-up with patient. Please contact us if you have any additional questions.    Rosio Rivera MD  Infectious Disease  [facility]    52 minutes of total time spent on " "the encounter, which includes face to face time and non-face to face time preparing to see the patient (eg, review of tests), obtaining and/or reviewing separately obtained history, documenting clinical information in the electronic or other health record, independently interpreting results (not separately reported) and communicating results to the patient/family/caregiver, or care coordination (not separately reported).     Subjective:     Principal Problem:Malignant carcinoid tumor of bronchus    HPI: 73 y.o. female never smoker with DDD, benign essential tremor, meniere's disease, HTN, HLD, DM2, GERD, Celiac's, IBS who is found to have RML Carcinoid tumor presented on 1/2 for lymphadenectomy,  robotic-assisted surgery converted to right thoracotomy with therapeutic wedge resection and open mediastinal lymph node dissection. Stepped up to the unit after converting into afib RVR after dialysis 1/5. She was given 3 doses of metoprolol 5, without any response and her heart rate or rhythm.  The oxygen requirements increased to 5 L from room air.      CT chest on 1/13 revealed mediastinal LAD, Moderate-sized bilateral pleural effusions, increased from 01/08/2024; likely multiply-loculated on the right. Underlying pleural infection or pleural neoplasm not excluded.     ID consulted for: "needs meropenem for right sided loculated pleural effusion, possible IR drainage later this week."    Patient seen and evaluated by Infectious Diseases with recommendations for meropenem while awaiting further work up of the right pleural effusion.    Infectious Diseases re-consulted on 1/16 for "EP needs ok for pacemaker previous to pleural effusion drainage"      Interval History: "Not good". Right sided pleural effusion of unknown etiology. On broad spectrum antibiotics. Underwent PPM o 1/16. Bradycardia episodes overnight. RIght sided chest tube placement on 1/17. S/P left sided chest tube on 1/18.    Review of Systems "   Constitutional:  Positive for fatigue. Negative for chills, fever and unexpected weight change.   HENT:  Negative for ear pain, facial swelling, hearing loss, mouth sores, nosebleeds, rhinorrhea, sinus pressure, sore throat, tinnitus, trouble swallowing and voice change.    Eyes:  Negative for photophobia, pain, redness and visual disturbance.   Respiratory:  Positive for cough and shortness of breath. Negative for chest tightness and wheezing.    Cardiovascular:  Negative for chest pain, palpitations and leg swelling.   Gastrointestinal:  Positive for nausea. Negative for abdominal pain, blood in stool, constipation, diarrhea and vomiting.   Endocrine: Negative for cold intolerance, heat intolerance, polydipsia, polyphagia and polyuria.   Genitourinary:  Negative for decreased urine volume, dysuria, flank pain, frequency, hematuria, menstrual problem, urgency, vaginal bleeding, vaginal discharge and vaginal pain.   Musculoskeletal:  Negative for arthralgias, back pain, joint swelling, myalgias and neck pain.   Skin:  Negative for rash.   Allergic/Immunologic: Negative for environmental allergies, food allergies and immunocompromised state.   Neurological:  Negative for dizziness, seizures, syncope, weakness, light-headedness, numbness and headaches.   Hematological:  Negative for adenopathy. Does not bruise/bleed easily.   Psychiatric/Behavioral:  Negative for confusion, hallucinations, self-injury, sleep disturbance and suicidal ideas. The patient is not nervous/anxious.      Objective:     Vital Signs (Most Recent):  Temp: 97.5 °F (36.4 °C) (01/19/24 1130)  Pulse: 88 (01/19/24 1130)  Resp: (!) 24 (01/19/24 1130)  BP: (!) 86/53 (01/19/24 1100)  SpO2: 99 % (01/19/24 1130) Vital Signs (24h Range):  Temp:  [97.3 °F (36.3 °C)-98 °F (36.7 °C)] 97.5 °F (36.4 °C)  Pulse:  [77-94] 88  Resp:  [15-43] 24  SpO2:  [88 %-100 %] 99 %  BP: ()/(51-70) 86/53     Weight: 77 kg (169 lb 12.1 oz)  Body mass index is 28.25  kg/m².    Estimated Creatinine Clearance: 30.2 mL/min (A) (based on SCr of 1.7 mg/dL (H)).     Physical Exam  Vitals and nursing note reviewed.   Constitutional:       Appearance: She is well-developed. She is ill-appearing.   HENT:      Head: Normocephalic and atraumatic.      Right Ear: External ear normal.      Left Ear: External ear normal.   Eyes:      General: No scleral icterus.        Right eye: No discharge.         Left eye: No discharge.      Conjunctiva/sclera: Conjunctivae normal.   Cardiovascular:      Rate and Rhythm: Normal rate and regular rhythm.      Heart sounds: Normal heart sounds. No murmur heard.     No friction rub. No gallop.   Pulmonary:      Effort: Pulmonary effort is normal. No respiratory distress.      Breath sounds: No stridor. Examination of the right-middle field reveals rales. Examination of the right-lower field reveals rales. Examination of the left-lower field reveals rales. Rales present. No decreased breath sounds or wheezing.   Abdominal:      General: Bowel sounds are normal.   Musculoskeletal:         General: No tenderness.      Right lower leg: Edema present.      Left lower leg: Edema present.   Skin:     General: Skin is warm and dry.   Neurological:      Mental Status: She is alert and oriented to person, place, and time.          Significant Labs: BMP:   Recent Labs   Lab 01/19/24  0318   *  138*     133*   K 4.8  4.7     105   CO2 22*  22*   BUN 19  18   CREATININE 1.7*  1.6*   CALCIUM 8.0*  8.1*   MG 2.3       CBC:   Recent Labs   Lab 01/18/24  0348 01/19/24  0318   WBC 21.82* 23.34*   HGB 7.2* 7.2*   HCT 22.5* 22.5*    194       Microbiology Results (last 7 days)       Procedure Component Value Units Date/Time    Culture, body fluid - Bactec [[id number]] Collected: 01/18/24 1537    Order Status: Completed Specimen: Body Fluid from Thoracentesis Fluid Updated: 01/19/24 0115     Body Fluid Culture, Sterile No Growth to date     Narrative:      IR procedure 1/18 L lung    AFB culture [[id number]] Collected: 01/17/24 1555    Order Status: Completed Specimen: Body Fluid from Pleural Fluid Updated: 01/18/24 2128     AFB Culture & Smear Culture in progress     AFB CULTURE STAIN No acid fast bacilli seen.    AFB stain [[id number]] Collected: 01/18/24 1538    Order Status: Completed Specimen: Body Fluid from Pleural Fluid Updated: 01/18/24 2048     Direct Acid Fast No acid fast bacilli seen.    Narrative:      IR procedure 1/18 L lung    Gram stain [[id number]] Collected: 01/18/24 1538    Order Status: Completed Specimen: Body Fluid from Pleural Fluid Updated: 01/18/24 2034     Gram Stain Result Rare WBC's      No organisms seen    Narrative:      IR procedure 1/18 L lung    Culture, body fluid - Bactec [[id number]] Collected: 01/17/24 1555    Order Status: Completed Specimen: Body Fluid from Pleural Fluid Updated: 01/18/24 2012     Body Fluid Culture, Sterile No Growth to date      No Growth to date    KOH prep [[id number]] Collected: 01/18/24 1538    Order Status: Completed Specimen: Body Fluid from Pleural Fluid Updated: 01/18/24 1932     KOH Prep No yeast or fungal elements seen    Narrative:      IR procedure 1/18 L lung    AFB culture [[id number]] Collected: 01/18/24 1538    Order Status: Sent Specimen: Body Fluid from Pleural Fluid Updated: 01/18/24 1652    Fungus culture [[id number]] Collected: 01/18/24 1538    Order Status: Sent Specimen: Body Fluid from Pleural Fluid Updated: 01/18/24 1652    AFB stain [[id number]] Collected: 01/17/24 1555    Order Status: Completed Specimen: Body Fluid from Pleural Fluid Updated: 01/17/24 2012     Direct Acid Fast No acid fast bacilli seen.    Gram stain [[id number]] Collected: 01/17/24 1555    Order Status: Completed Specimen: Body Fluid from Pleural Fluid Updated: 01/17/24 1848     Gram Stain Result No WBC's      No organisms seen    JADEN prep [[id number]] Collected: 01/17/24 1555    Order Status:  Completed Specimen: Body Fluid from Pleural Fluid Updated: 01/17/24 1836     KOH Prep No yeast or fungal elements seen    Fungus culture [[id number]] Collected: 01/17/24 1555    Order Status: Sent Specimen: Body Fluid from Pleural Fluid Updated: 01/17/24 1639    Blood culture [[id number]] Collected: 01/12/24 0411    Order Status: Completed Specimen: Blood from Peripheral, Foot, Left Updated: 01/17/24 0812     Blood Culture, Routine No growth after 5 days.    Culture, Respiratory with Gram Stain [[id number]]  (Abnormal)  (Susceptibility) Collected: 01/11/24 2030    Order Status: Completed Specimen: Sputum, Induced Updated: 01/15/24 0925     Respiratory Culture No S aureus isolated.      KLEBSIELLA PNEUMONIAE  Many        ESCHERICHIA COLI  Many  Normal respiratory ramu also present       Gram Stain (Respiratory) <10 epithelial cells per low power field.     Gram Stain (Respiratory) Moderate WBC's     Gram Stain (Respiratory) Many Gram negative rods     Gram Stain (Respiratory) Rare Gram positive cocci    Blood culture [[id number]]  (Abnormal) Collected: 01/11/24 1601    Order Status: Completed Specimen: Blood from Peripheral, Antecubital, Right Updated: 01/14/24 0812     Blood Culture, Routine Gram stain shahriar bottle: Gram positive cocci in clusters resembling Staph      Results called to and read back by: Gena UNGER RN 01/12/2024  18:43      COAGULASE-NEGATIVE STAPHYLOCOCCUS SPECIES  Organism is a probable contaminant      Culture, MRSA [[id number]] Collected: 01/11/24 1836    Order Status: Completed Specimen: MRSA source from Nares, Right Updated: 01/13/24 0844     MRSA Surveillance Screen No MRSA isolated    Rapid Organism ID by PCR (from Blood culture) [[id number]]  (Abnormal) Collected: 01/11/24 1601    Order Status: Completed Updated: 01/12/24 1956     Enterococcus faecalis Not Detected     Enterococcus faecium Not Detected     Listeria monocytogenes Not Detected     Staphylococcus spp. See species for ID      Staphylococcus aureus Not Detected     Staphylococcus epidermidis Detected     Staphylococcus lugdunensis Not Detected     Streptococcus species Not Detected     Streptococcus agalactiae Not Detected     Streptococcus pneumoniae Not Detected     Streptococcus pyogenes Not Detected     Acinetobacter calcoaceticus/baumannii complex Not Detected     Bacteroides fragilis Not Detected     Enterobacterales Not Detected     Enterobacter cloacae complex Not Detected     Escherichia coli Not Detected     Klebsiella aerogenes Not Detected     Klebsiella oxytoca Not Detected     Klebsiella pneumoniae group Not Detected     Proteus Not Detected     Salmonella sp Not Detected     Serratia marcescens Not Detected     Haemophilus influenzae Not Detected     Neisseria meningtidis Not Detected     Pseudomonas aeruginosa Not Detected     Stenotrophomonas maltophilia Not Detected     Candida albicans Not Detected     Candida auris Not Detected     Candida glabrata Not Detected     Candida krusei Not Detected     Candida parapsilosis Not Detected     Candida tropicalis Not Detected     Cryptococcus neoformans/gattii Not Detected     CTX-M (ESBL ) Test Not Applicable     IMP (Carbapenem resistant) Test Not Applicable     KPC resistance gene (Carbapenem resistant) Test Not Applicable     mcr-1  Test Not Applicable     mec A/C  Not Detected     mec A/C and MREJ (MRSA) gene Test Not Applicable     NDM (Carbapenem resistant) Test Not Applicable     OXA-48-like (Carbapenem resistant) Test Not Applicable     van A/B (VRE gene) Test Not Applicable     VIM (Carbapenem resistant) Test Not Applicable            Significant Imaging: I have reviewed all pertinent imaging results/findings within the past 24 hours.

## 2024-01-19 NOTE — CONSULTS
[facility]  Psychology  Consult Note      PSYCHOSOCIAL ASSESSMENT  Patient Name: Lily Green  MRN: [id number]  Date: 01/19/2024  Admission Date: 1/2/2024   Length of Stay: Hospital Day: 18   Attending Physician: Tan Thomson MD    Inpatient consult to Psychology  Consult performed by: Alexandra Campos  Consult ordered by: Brooklyn Sapp MD          HISTORY OF PRESENTING ILLNESS: Lily Green is a 73 y.o. 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 Post op course notable for persistent afib rvr, SBO (now resolved), tachy-eulogio syndrome and asystolic arrest x3 s/p emergent TVP placement on 1/12 and s/p PPM placement on 1/16/24, bilateral pleural effusions s/p R chest tube placement into loculated effusion on 1/17/24.      Interventional Radiology has been consulted for L sided chest tube placement. Pt with persistent supplemental O2 needs and intermittent tachycardia prompting CT chest on 1/13/24 which revealed moderate-sized bilateral pleural effusions, increased from 01/08/2024 with the right sided effusion appearing multiloculated. Pt underwent chest tube placement into R lower lung base loculation on 1/17/24 with drainage of 1460 serosanguinous fluid. Pleural fluid studies currently pending. The pt is not in respiratory distress, satting well on 2 L O2 via NC. The pt is hemodynamically stable.     CURRENT SYMPTOMS  Mood: Endorsed low mood, anhedonia and hopelessness. Denied suicidal ideations/thoughts of death.  Anxiety: Denied worries or difficulty controlling worries.   Substance Use: Denied Substance use  Alcohol: Denied    Tobacco: Denied    Marijuana: Denied    Illicit drugs: Denied    Caffeine: Denied    Pain: Patient did not reported levels of pain and denied concerns regarding pain management.  Sleep: Difficulty with  onset and late night awakenings.   Appetite: Decreased appetite    PSYCHIATRIC HISTORY  Previous hospitalizations: None     History of outpatient treatment: Patient reported outpatient treatment with a counselor two years ago.    Previous Mental Health Diagnosis: Patient reported she does not recall her previous diagnosis.     Previous suicide attempt: None    PSYCHIATRIC MEDICATIONS  Psychotherapeutics (From admission, onward)      None            MEDICAL HISTORY  Past Medical History:   Diagnosis Date    Anxiety     Celiac disease 2018    Celiac disease     Depression     Diabetes mellitus     Family history of breast cancer in mother      at age 68    Hyperlipidemia     Hypertension     Meniere disease     Meniere's disease, unspecified ear     Menopause     Peptic ulcer     Reflux esophagitis     Urinary tract infection     Vaginal infection     Vaginal Pap smear 2014    Pap/Hpv Negative       Past Surgical History:   Procedure Laterality Date    APPENDECTOMY      BREAST SURGERY      Tags    CARPAL TUNNEL RELEASE Bilateral 2017    ,     CLOSURE, COLOSTOMY Left 2023    Procedure: CLOSURE, COLOSTOMY;  Surgeon: Manuel Javier MD;  Location: [facility] OR;  Service: General;  Laterality: Left;    COLONOSCOPY  2018    Normal  ( Juan A)     COLOSTOMY Right 2023    Procedure: CREATION, COLOSTOMY;  Surgeon: Manuel Javier MD;  Location: [facility] OR;  Service: General;  Laterality: Right;    DILATION AND CURETTAGE OF UTERUS  1986    DUPUYTREN CONTRACTURE RELEASE Bilateral 2017    HYSTERECTOMY      BEAU w/ appy, no BSO     IMPLANTATION OF LEADLESS PACEMAKER N/A 2024    Procedure: INSERTION, CARDIAC PACEMAKER, LEADLESS;  Surgeon: LENIN Frances MD;  Location: [facility] EP LAB;  Service: Cardiology;  Laterality: N/A;  SB, LEADLESS PPM (MICRA), MDT, ANES, EH, CVICU [id number]    INJECTION OF NEUROLYTIC AGENT AROUND LUMBAR SYMPATHETIC NERVE N/A 2022    Procedure: BLOCK, LUMBAR  SYMPATHETIC;  Surgeon: Souleymane Rizo Jr., MD;  Location: [facility] PAIN MGT;  Service: Pain Management;  Laterality: N/A;  no pacemaker   pt is diabetic     INJECTION OF NEUROLYTIC AGENT AROUND LUMBAR SYMPATHETIC NERVE N/A 8/25/2022    Procedure: BLOCK, LUMBAR SYMPATHETIC;  Surgeon: Souleymane Rizo Jr., MD;  Location: [facility] PAIN MGT;  Service: Pain Management;  Laterality: N/A;  diabetic     INSERTION OF TUNNELED CENTRAL VENOUS HEMODIALYSIS CATHETER Right 1/25/2023    Procedure: INSERTION, CATHETER, HEMODIALYSIS, DUAL LUMEN;  Surgeon: Manuel Javier MD;  Location: [facility] OR;  Service: General;  Laterality: Right;    INSERTION, CATHETER, TUNNELED Left 1/28/2023    Procedure: Insertion,catheter,tunneled;  Surgeon: Watson Fontenot MD;  Location: [facility] OR;  Service: General;  Laterality: Left;    LAPAROTOMY, EXPLORATORY N/A 1/14/2023    Procedure: LAPAROTOMY, EXPLORATORY;  Surgeon: Manuel Javier MD;  Location: [facility] OR;  Service: General;  Laterality: N/A;    LAPAROTOMY, EXPLORATORY N/A 1/16/2023    Procedure: LAPAROTOMY, EXPLORATORY;  Surgeon: Manuel Javier MD;  Location: [facility] OR;  Service: General;  Laterality: N/A;    LYMPHADENECTOMY Right 1/2/2024    Procedure: LYMPHADENECTOMY;  Surgeon: Tan Thomson MD;  Location: [address];  Service: Cardiothoracic;  Laterality: Right;    PACEMAKER, TEMPORARY, TRANSVENOUS, PEDIATRIC Right 1/12/2024    Procedure: Pacemaker, Temporary, Transvenous;  Surgeon: Todd Carlisle MD;  Location: [facility] CATH LAB;  Service: Cardiology;  Laterality: Right;    REMOVAL OF CATHETER Right 1/28/2023    Procedure: REMOVAL, CATHETER;  Surgeon: Watson Fontenot MD;  Location: [facility] OR;  Service: General;  Laterality: Right;    REMOVAL, TUNNELED CATH Right 1/25/2023    Procedure: REMOVAL, TUNNELED CATH;  Surgeon: Manuel Javier MD;  Location: [facility] OR;  Service: General;  Laterality: Right;    ROBOTIC BRONCHOSCOPY N/A 10/20/2023    Procedure: ROBOTIC BRONCHOSCOPY;  Surgeon:  Mayda Monzon MD;  Location: [address];  Service: Pulmonary;  Laterality: N/A;    SHOULDER SURGERY  2009 & 2010    right rotator cuff    THORACOTOMY Right 1/2/2024    Procedure: THORACOTOMY;  Surgeon: Tan Thomson MD;  Location: [address];  Service: Cardiothoracic;  Laterality: Right;    THORACOTOMY, WITH INITIAL THERAPEUTIC WEDGE RESECTION OF LUNG Right 1/2/2024    Procedure: THORACOTOMY, WITH INITIAL THERAPEUTIC WEDGE RESECTION OF LUNG;  Surgeon: Tan Thomson MD;  Location: [address];  Service: Cardiothoracic;  Laterality: Right;    TONSILLECTOMY      UPPER GASTROINTESTINAL ENDOSCOPY  04/2018        SOCIAL HISTORY  Born and raised in [place], patient states she lived with her biological parents and sister during her early years. She reports her childhood as positive and did not report any difficulty in her school years. She dedicated her life to being a  until she retired. Currently, she lives with her  and her dog, she does not have children.     FAMILY PSYCHIATRIC HISTORY  None reported     STRENGTHS & LIABILITIES  Strengths: good judgment, accepts guidance and feedback, and good social support   Liabilities: Health complications     MENTAL STATUS EXAM & OBSERVATIONS  Appearance:  Good grooming and hygiene. Dressed in hospital gown. Appeared stated age.      Orientation: Oriented x 4.   Speech: Normal rate, volume, and prosody.    Thought Processes: Logical and goal-oriented.      Thought Content: Normal. No suicidal or homicidal ideation. No indications of obsessions or delusions.   Mood: Euthymic.   Affect: Full range, congruent with mood and session content..   Attention & Concentration: Intact. No deficits noted.   Insight: Good   Judgment: Good.   Behavioral Observations: Cooperative and engaged. Laying in bed. Good eye contact. No signs of psychomotor agitation or retardation.     DIAGNOSTIC IMPRESSION & PLAN  Patient Active Problem List   Diagnosis     Type 2 diabetes mellitus with diabetic polyneuropathy, without long-term current use of insulin    Hyperlipidemia, mixed    GERD without esophagitis    Mild recurrent major depression    Primary insomnia    Chronic neck pain    DDD (degenerative disc disease), cervical    Screening mammogram, encounter for    Celiac disease    Hand arthritis    Benign essential tremor    Chronic pain of both knees    Irritable bowel syndrome    TANIA (stress urinary incontinence, female)    Meniere's disease of both ears    Ankle weakness    Psoriasis    Idiopathic neuropathy    Bilateral foot pain    Generalized anxiety disorder    Cognitive complaints    Peritonitis    Large bowel ischemia    Elevated lactic acid level    Hyponatremia    Primary hypertension    Anemia    Hypophosphatemia    Debility    Weakness    Lung nodule    Malignant carcinoid tumor of bronchus    ESRD (end stage renal disease)    Atrial fibrillation    Anticoagulated on heparin    Anuria    A-V fistula    Bradycardia    Parapneumonic effusion       Impression: Lily Green is a 73 y.o. 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 Psychology was consulted due to complicated hospitalization and depression. Patient endorsed low mood, anhedonia, impaired sleep, decreased appetite and hopelessness most of the days for a period of weeks. Denied suicidal ideations/thoughts of death. Patient denied worries or difficulty controlling worries. At the time of the evaluation, patient meets criteria for Major Depressive Disorder. Patient expressed coping skills such as prayer and talking to family members. Clinician assessed her willingness to engage in psychological services and patient declined openness to psychological services. Patient presents with good social support.       Plan: Patient was given [facility]  [facility] contact information.     Recommendations  Patient would benefit from outpatient psychological and psychiatric services to explore psychopharmacological options for her existing depressive symptoms.   Contact psychology as needed.     Thank you for the opportunity to participate in this patient's care.      Length of Service: 30 minutes    Alexandra Montenegro  Clinical Psychology Resident  [facility]

## 2024-01-19 NOTE — NURSING
Rhythm change noted on monitor at approximately 1700 - appeared to be atrial fibrillation with rapid ventricular response; heart rate noted to be in 110-130s BPM.    EKG obtained; MD notified. Orders obtained for three pushes of 5mg metoprolol every five minutes. Three pushes given as ordered with no improvement in heart rate until completion of third administration. Patient remains in atrial fibrillation with improvement in heart rate to  BPM.

## 2024-01-19 NOTE — ASSESSMENT & PLAN NOTE
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 On HD ~ 1 year. Last HD prior to presentation 1/1/24. Pt has NELY AVF placed on 10/23 that has not been cleared by vascular. Currently using RIJ TDC.     During her admission: stepped up to ICU for Afib with RVR. Was started on BB, amio and digoxin. However 1/12/24, had multiple episodes of eulogio with few asystole requiring chest compressions. Digoxin level elevated and dig-meaghan was given with resulting decrease in levels.     Nephrology History  iHD Schedule: TTS   Unit/MD: Timbo Hinds/ Dr. Vyas  Duration: 3.5 hours   EDW: 58 kg   Access: RIJ TDC  Residual Renal Function: minimal       Volume status:  Hypervolemic, negative 2L/24h.    Assessment:     -plan for HD today.  No net ultrafiltration  -midodrine to be given prior to HD  -Keep MAP > 65  -Keep hemoglobin > 7  -Strict ins and outs  -Avoid nephrotoxic agents if possible and renally dose medications  -Avoid drastic hemodynamic changes if possible

## 2024-01-19 NOTE — PROGRESS NOTES
[facility]  Nephrology  Progress Note    Patient Name: Lily Green  MRN: [id number]  Admission Date: 1/2/2024  Hospital Length of Stay: 17 days  Attending Provider: Tan Thomson MD   Primary Care Physician: Pavel Calixto MD  Principal Problem:Malignant carcinoid tumor of bronchus    Subjective:     HPI: Patient is a 73 y.o. 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 S/p thoracotomy. Last HD prior to presentation 1/1/24. Nephrology consulted for ESRD on HD.        Interval History:   CT draining 2.3L over the past, negative 2L for the day.  Electrolytes stable and non-emergent. Labile blood pressures, normally takes midodrine at home.      Review of patient's allergies indicates:   Allergen Reactions    Crestor [rosuvastatin] Swelling    Gluten protein      Current Facility-Administered Medications   Medication Frequency    0.9%  NaCl infusion PRN    0.9%  NaCl infusion Continuous    albuterol sulfate nebulizer solution 2.5 mg Q4H PRN    bisacodyL suppository 10 mg Daily PRN    dextrose 10% bolus 125 mL 125 mL PRN    dextrose 10% bolus 250 mL 250 mL PRN    droNABinol capsule 5 mg BID    epoetin noble injection 4,000 Units Every Mon, Wed, Fri    glucagon (human recombinant) injection 1 mg PRN    glucose chewable tablet 16 g PRN    glucose chewable tablet 24 g PRN    heparin (porcine) injection 1,000 Units PRN    hydrALAZINE injection 10 mg Q6H PRN    insulin aspart U-100 pen 0-10 Units QID (AC + HS) PRN    insulin detemir U-100 (Levemir) pen 6 Units Daily    LIDOcaine 5 % patch 1 patch Q24H    meropenem (MERREM) 500 mg in sodium chloride 0.9 % 100 mL IVPB (MB+) Q12H    midodrine tablet 10 mg PRN    midodrine tablet 5 mg BID WM    ondansetron injection 8 mg Q8H PRN    oxybutynin tablet 5 mg TID    pantoprazole EC tablet 40 mg Daily    polyethylene glycol  packet 17 g Daily    senna-docusate 8.6-50 mg per tablet 1 tablet Daily    sodium chloride 0.9% flush 10 mL PRN       Objective:     Vital Signs (Most Recent):  Temp: 97.5 °F (36.4 °C) (01/19/24 1130)  Pulse: 88 (01/19/24 1130)  Resp: (!) 24 (01/19/24 1130)  BP: (!) 86/53 (01/19/24 1100)  SpO2: 99 % (01/19/24 1130) Vital Signs (24h Range):  Temp:  [97.3 °F (36.3 °C)-98 °F (36.7 °C)] 97.5 °F (36.4 °C)  Pulse:  [77-94] 88  Resp:  [15-43] 24  SpO2:  [88 %-100 %] 99 %  BP: ()/(51-70) 86/53     Weight: 77 kg (169 lb 12.1 oz) (01/17/24 0615)  Body mass index is 28.25 kg/m².  Body surface area is 1.88 meters squared.    I/O last 3 completed shifts:  In: 2729 [I.V.:2381.6; IV Piggyback:347.4]  Out: 6124 [Other:3664; Chest Tube:2460]     Physical Exam  Vitals and nursing note reviewed.   Constitutional:       Appearance: She is well-developed. She is ill-appearing.   HENT:      Head: Normocephalic and atraumatic.      Right Ear: External ear normal.      Left Ear: External ear normal.   Eyes:      General: No scleral icterus.        Right eye: No discharge.         Left eye: No discharge.      Conjunctiva/sclera: Conjunctivae normal.   Cardiovascular:      Rate and Rhythm: Normal rate and regular rhythm.      Heart sounds: Normal heart sounds. No murmur heard.     No friction rub. No gallop.   Pulmonary:      Effort: Pulmonary effort is normal. No respiratory distress.      Breath sounds: No stridor. Examination of the right-lower field reveals decreased breath sounds. Decreased breath sounds present. No wheezing or rales.   Abdominal:      General: Bowel sounds are normal.   Musculoskeletal:         General: No tenderness.      Right lower leg: Edema present.      Left lower leg: Edema present.   Skin:     General: Skin is warm and dry.   Neurological:      Mental Status: She is alert and oriented to person, place, and time.          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 On HD ~ 1 year. Last HD prior to presentation 1/1/24. Pt has NELY AVF placed on 10/23 that has not been cleared by vascular. Currently using RIJ TDC.     During her admission: stepped up to ICU for Afib with RVR. Was started on BB, amio and digoxin. However 1/12/24, had multiple episodes of eulogio with few asystole requiring chest compressions. Digoxin level elevated and dig-meaghan was given with resulting decrease in levels.     Nephrology History  iHD Schedule: TTS   Unit/MD: Timbo Hinds/ Dr. Vyas  Duration: 3.5 hours   EDW: 58 kg   Access: RIJ TDC  Residual Renal Function: minimal       Volume status:  Hypervolemic, negative 2L/24h.    Assessment:     -plan for HD today.  No net ultrafiltration  -midodrine to be given prior to HD  -Keep MAP > 65  -Keep hemoglobin > 7  -Strict ins and outs  -Avoid nephrotoxic agents if possible and renally dose medications  -Avoid drastic hemodynamic changes if possible      Oncology  * Malignant carcinoid tumor of bronchus  -management per primary       Neto Pitts, NP  Nephrology  [facility]

## 2024-01-19 NOTE — ASSESSMENT & PLAN NOTE
BG goal: 140-180    - Levemir 6 units daily   - Novolog Mod dose correction with ISF 25 starting at 150 prn   - POCT Glucose before meals and at bedtime   - Hypoglycemia protocol in place      ** Please notify Endocrine for any change and/or advance in diet**  ** Please call Endocrine for any BG related issues **     Discharge Planning:   TBD. Please notify endocrinology prior to discharge.

## 2024-01-20 LAB
ABO + RH BLD: NORMAL
ALBUMIN SERPL BCP-MCNC: 1.7 G/DL (ref 3.5–5.2)
ALP SERPL-CCNC: 104 U/L (ref 55–135)
ALT SERPL W/O P-5'-P-CCNC: 11 U/L (ref 10–44)
ANION GAP SERPL CALC-SCNC: 5 MMOL/L (ref 8–16)
AST SERPL-CCNC: 17 U/L (ref 10–40)
BASOPHILS # BLD AUTO: 0.02 K/UL (ref 0–0.2)
BASOPHILS # BLD AUTO: 0.06 K/UL (ref 0–0.2)
BASOPHILS NFR BLD: 0.1 % (ref 0–1.9)
BASOPHILS NFR BLD: 0.3 % (ref 0–1.9)
BILIRUB SERPL-MCNC: 0.5 MG/DL (ref 0.1–1)
BLD GP AB SCN CELLS X3 SERPL QL: NORMAL
BLD PROD TYP BPU: NORMAL
BLOOD UNIT EXPIRATION DATE: NORMAL
BLOOD UNIT TYPE CODE: 6200
BLOOD UNIT TYPE: NORMAL
BUN SERPL-MCNC: 17 MG/DL (ref 8–23)
CA-I BLDV-SCNC: 1.15 MMOL/L (ref 1.06–1.42)
CALCIUM SERPL-MCNC: 7.7 MG/DL (ref 8.7–10.5)
CHLORIDE SERPL-SCNC: 106 MMOL/L (ref 95–110)
CO2 SERPL-SCNC: 23 MMOL/L (ref 23–29)
CODING SYSTEM: NORMAL
CREAT SERPL-MCNC: 1.4 MG/DL (ref 0.5–1.4)
CROSSMATCH INTERPRETATION: NORMAL
DIFFERENTIAL METHOD BLD: ABNORMAL
DIFFERENTIAL METHOD BLD: ABNORMAL
DISPENSE STATUS: NORMAL
EOSINOPHIL # BLD AUTO: 0 K/UL (ref 0–0.5)
EOSINOPHIL # BLD AUTO: 0.1 K/UL (ref 0–0.5)
EOSINOPHIL NFR BLD: 0.2 % (ref 0–8)
EOSINOPHIL NFR BLD: 0.3 % (ref 0–8)
ERYTHROCYTE [DISTWIDTH] IN BLOOD BY AUTOMATED COUNT: 21.9 % (ref 11.5–14.5)
ERYTHROCYTE [DISTWIDTH] IN BLOOD BY AUTOMATED COUNT: 22 % (ref 11.5–14.5)
EST. GFR  (NO RACE VARIABLE): 39.7 ML/MIN/1.73 M^2
GLUCOSE SERPL-MCNC: 152 MG/DL (ref 70–110)
HCT VFR BLD AUTO: 21.8 % (ref 37–48.5)
HCT VFR BLD AUTO: 26.8 % (ref 37–48.5)
HGB BLD-MCNC: 6.8 G/DL (ref 12–16)
HGB BLD-MCNC: 8.6 G/DL (ref 12–16)
IMM GRANULOCYTES # BLD AUTO: 0.35 K/UL (ref 0–0.04)
IMM GRANULOCYTES # BLD AUTO: 0.44 K/UL (ref 0–0.04)
IMM GRANULOCYTES NFR BLD AUTO: 2.1 % (ref 0–0.5)
IMM GRANULOCYTES NFR BLD AUTO: 2.3 % (ref 0–0.5)
LYMPHOCYTES # BLD AUTO: 0.9 K/UL (ref 1–4.8)
LYMPHOCYTES # BLD AUTO: 1.1 K/UL (ref 1–4.8)
LYMPHOCYTES NFR BLD: 5.4 % (ref 18–48)
LYMPHOCYTES NFR BLD: 6 % (ref 18–48)
MAGNESIUM SERPL-MCNC: 2 MG/DL (ref 1.6–2.6)
MCH RBC QN AUTO: 29.3 PG (ref 27–31)
MCH RBC QN AUTO: 29.7 PG (ref 27–31)
MCHC RBC AUTO-ENTMCNC: 31.2 G/DL (ref 32–36)
MCHC RBC AUTO-ENTMCNC: 32.1 G/DL (ref 32–36)
MCV RBC AUTO: 92 FL (ref 82–98)
MCV RBC AUTO: 94 FL (ref 82–98)
MONOCYTES # BLD AUTO: 1.2 K/UL (ref 0.3–1)
MONOCYTES # BLD AUTO: 1.3 K/UL (ref 0.3–1)
MONOCYTES NFR BLD: 6.8 % (ref 4–15)
MONOCYTES NFR BLD: 7.4 % (ref 4–15)
NEUTROPHILS # BLD AUTO: 14 K/UL (ref 1.8–7.7)
NEUTROPHILS # BLD AUTO: 15.8 K/UL (ref 1.8–7.7)
NEUTROPHILS NFR BLD: 84.3 % (ref 38–73)
NEUTROPHILS NFR BLD: 84.8 % (ref 38–73)
NRBC BLD-RTO: 0 /100 WBC
NRBC BLD-RTO: 0 /100 WBC
NUM UNITS TRANS PACKED RBC: NORMAL
PHOSPHATE SERPL-MCNC: 1.9 MG/DL (ref 2.7–4.5)
PLATELET # BLD AUTO: 178 K/UL (ref 150–450)
PLATELET # BLD AUTO: 205 K/UL (ref 150–450)
PMV BLD AUTO: 11.9 FL (ref 9.2–12.9)
PMV BLD AUTO: 11.9 FL (ref 9.2–12.9)
POCT GLUCOSE: 114 MG/DL (ref 70–110)
POCT GLUCOSE: 129 MG/DL (ref 70–110)
POCT GLUCOSE: 160 MG/DL (ref 70–110)
POCT GLUCOSE: 170 MG/DL (ref 70–110)
POTASSIUM SERPL-SCNC: 4 MMOL/L (ref 3.5–5.1)
PROT SERPL-MCNC: 4.5 G/DL (ref 6–8.4)
RBC # BLD AUTO: 2.32 M/UL (ref 4–5.4)
RBC # BLD AUTO: 2.9 M/UL (ref 4–5.4)
SODIUM SERPL-SCNC: 134 MMOL/L (ref 136–145)
SPECIMEN OUTDATE: NORMAL
WBC # BLD AUTO: 16.54 K/UL (ref 3.9–12.7)
WBC # BLD AUTO: 18.74 K/UL (ref 3.9–12.7)

## 2024-01-20 PROCEDURE — 99233 SBSQ HOSP IP/OBS HIGH 50: CPT | Mod: 24,,, | Performed by: STUDENT IN AN ORGANIZED HEALTH CARE EDUCATION/TRAINING PROGRAM

## 2024-01-20 PROCEDURE — 86850 RBC ANTIBODY SCREEN: CPT

## 2024-01-20 PROCEDURE — 80053 COMPREHEN METABOLIC PANEL: CPT | Performed by: STUDENT IN AN ORGANIZED HEALTH CARE EDUCATION/TRAINING PROGRAM

## 2024-01-20 PROCEDURE — 85025 COMPLETE CBC W/AUTO DIFF WBC: CPT | Mod: 91

## 2024-01-20 PROCEDURE — 84100 ASSAY OF PHOSPHORUS: CPT | Performed by: STUDENT IN AN ORGANIZED HEALTH CARE EDUCATION/TRAINING PROGRAM

## 2024-01-20 PROCEDURE — 25000003 PHARM REV CODE 250

## 2024-01-20 PROCEDURE — 85025 COMPLETE CBC W/AUTO DIFF WBC: CPT

## 2024-01-20 PROCEDURE — 63600175 PHARM REV CODE 636 W HCPCS

## 2024-01-20 PROCEDURE — 83735 ASSAY OF MAGNESIUM: CPT | Performed by: STUDENT IN AN ORGANIZED HEALTH CARE EDUCATION/TRAINING PROGRAM

## 2024-01-20 PROCEDURE — 82330 ASSAY OF CALCIUM: CPT

## 2024-01-20 PROCEDURE — 63600175 PHARM REV CODE 636 W HCPCS: Performed by: STUDENT IN AN ORGANIZED HEALTH CARE EDUCATION/TRAINING PROGRAM

## 2024-01-20 PROCEDURE — P9016 RBC LEUKOCYTES REDUCED: HCPCS

## 2024-01-20 PROCEDURE — 99233 SBSQ HOSP IP/OBS HIGH 50: CPT | Mod: ,,, | Performed by: INTERNAL MEDICINE

## 2024-01-20 PROCEDURE — 11000001 HC ACUTE MED/SURG PRIVATE ROOM

## 2024-01-20 PROCEDURE — 86920 COMPATIBILITY TEST SPIN: CPT

## 2024-01-20 PROCEDURE — 99232 SBSQ HOSP IP/OBS MODERATE 35: CPT | Mod: GC,,, | Performed by: ANESTHESIOLOGY

## 2024-01-20 PROCEDURE — 25000003 PHARM REV CODE 250: Performed by: STUDENT IN AN ORGANIZED HEALTH CARE EDUCATION/TRAINING PROGRAM

## 2024-01-20 PROCEDURE — 25000003 PHARM REV CODE 250: Performed by: SURGERY

## 2024-01-20 PROCEDURE — 99232 SBSQ HOSP IP/OBS MODERATE 35: CPT | Mod: ,,,

## 2024-01-20 RX ORDER — MIDODRINE HYDROCHLORIDE 5 MG/1
5 TABLET ORAL ONCE
Status: COMPLETED | OUTPATIENT
Start: 2024-01-20 | End: 2024-01-20

## 2024-01-20 RX ORDER — SODIUM,POTASSIUM PHOSPHATES 280-250MG
1 POWDER IN PACKET (EA) ORAL ONCE
Status: COMPLETED | OUTPATIENT
Start: 2024-01-20 | End: 2024-01-20

## 2024-01-20 RX ORDER — ACETAMINOPHEN 325 MG/1
650 TABLET ORAL EVERY 4 HOURS PRN
Status: DISCONTINUED | OUTPATIENT
Start: 2024-01-20 | End: 2024-02-06 | Stop reason: HOSPADM

## 2024-01-20 RX ORDER — HYDROCODONE BITARTRATE AND ACETAMINOPHEN 500; 5 MG/1; MG/1
TABLET ORAL
Status: DISCONTINUED | OUTPATIENT
Start: 2024-01-20 | End: 2024-01-22

## 2024-01-20 RX ADMIN — POLYETHYLENE GLYCOL 3350 17 G: 17 POWDER, FOR SOLUTION ORAL at 08:01

## 2024-01-20 RX ADMIN — LIDOCAINE 5% 1 PATCH: 700 PATCH TOPICAL at 11:01

## 2024-01-20 RX ADMIN — DRONABINOL 5 MG: 2.5 CAPSULE ORAL at 08:01

## 2024-01-20 RX ADMIN — SENNOSIDES AND DOCUSATE SODIUM 1 TABLET: 8.6; 5 TABLET ORAL at 08:01

## 2024-01-20 RX ADMIN — SODIUM CHLORIDE: 9 INJECTION, SOLUTION INTRAVENOUS at 11:01

## 2024-01-20 RX ADMIN — ACETAMINOPHEN 650 MG: 325 TABLET ORAL at 08:01

## 2024-01-20 RX ADMIN — INSULIN ASPART 2 UNITS: 100 INJECTION, SOLUTION INTRAVENOUS; SUBCUTANEOUS at 08:01

## 2024-01-20 RX ADMIN — MEROPENEM 500 MG: 500 INJECTION INTRAVENOUS at 10:01

## 2024-01-20 RX ADMIN — MIDODRINE HYDROCHLORIDE 5 MG: 5 TABLET ORAL at 12:01

## 2024-01-20 RX ADMIN — OXYBUTYNIN CHLORIDE 5 MG: 5 TABLET ORAL at 08:01

## 2024-01-20 RX ADMIN — MEROPENEM 500 MG: 500 INJECTION INTRAVENOUS at 11:01

## 2024-01-20 RX ADMIN — MIDODRINE HYDROCHLORIDE 5 MG: 5 TABLET ORAL at 01:01

## 2024-01-20 RX ADMIN — OXYBUTYNIN CHLORIDE 5 MG: 5 TABLET ORAL at 03:01

## 2024-01-20 RX ADMIN — INSULIN ASPART 2 UNITS: 100 INJECTION, SOLUTION INTRAVENOUS; SUBCUTANEOUS at 11:01

## 2024-01-20 RX ADMIN — PANTOPRAZOLE SODIUM 40 MG: 40 TABLET, DELAYED RELEASE ORAL at 08:01

## 2024-01-20 RX ADMIN — MIDODRINE HYDROCHLORIDE 5 MG: 5 TABLET ORAL at 04:01

## 2024-01-20 RX ADMIN — MIDODRINE HYDROCHLORIDE 5 MG: 5 TABLET ORAL at 08:01

## 2024-01-20 RX ADMIN — SODIUM CHLORIDE, POTASSIUM CHLORIDE, SODIUM LACTATE AND CALCIUM CHLORIDE 500 ML: 600; 310; 30; 20 INJECTION, SOLUTION INTRAVENOUS at 01:01

## 2024-01-20 RX ADMIN — POTASSIUM & SODIUM PHOSPHATES POWDER PACK 280-160-250 MG 1 PACKET: 280-160-250 PACK at 03:01

## 2024-01-20 NOTE — CLINICAL REVIEW
Nephrology Chart Review      Intake/Output Summary (Last 24 hours) at 1/20/2024 0845  Last data filed at 1/20/2024 0800  Gross per 24 hour   Intake 2235.03 ml   Output 775 ml   Net 1460.03 ml       Vitals:    01/20/24 0745 01/20/24 0800 01/20/24 0815 01/20/24 0830   BP:  109/76     BP Location:  Right arm     Patient Position:       Pulse: 76 77 78 80   Resp: (!) 24 (!) 27 (!) 25 (!) 23   Temp:       TempSrc:       SpO2: 99% 100% 99% 100%   Weight:       Height:           Recent Labs   Lab 01/19/24  0318 01/19/24  1513 01/20/24  0104     133* 137 134*   K 4.8  4.7 3.4* 4.0     105 107 106   CO2 22*  22* 25 23   BUN 19  18 16 17   CREATININE 1.7*  1.6* 1.2 1.4   CALCIUM 8.0*  8.1* 8.3* 7.7*   PHOS 2.7  2.6* 1.4* 1.9*       - S/p HD no UF done  - Still with borderline pressures.   - no urgent/emergent needs for HD today. Will monitor need on daily basis.     No other related issues identified. Please call as needed; We will continue to follow.    Henri Carlisle

## 2024-01-20 NOTE — PROGRESS NOTES
"[facility]  Infectious Disease  Progress Note    Patient Name: Lily Green  MRN: [id number]  Admission Date: 1/2/2024  Length of Stay: 18 days  Attending Physician: Tan Thomson MD  Primary Care Provider: Pavel Calixto MD    Isolation Status: No active isolations  Assessment/Plan:      Pulmonary  Parapneumonic effusion  I have reviewed hospital notes from  SIC service and other specialty providers. I have also reviewed CBC, CMP/BMP,  cultures and imaging with my interpretation as documented.     Admitted for carcinoid tumor lymphadenectomy,  robotic-assisted surgery converted to right thoracotomy with therapeutic wedge resection and open mediastinal lymph node dissection on 1/2. Hospital course complicated by Afib with RVR, ileus managed with NGT which has since been removed, and tachy-eulogio syndrome requiring temporary pacemaker. Now with multi loculated pleural effusion presumptively parapneumonic in etiology. However, diagnostic work up to assess whether effusion is infectious or non infectious not yet performed.     Re-consulted on 1/16 for "OK" to proceed with pacemaker placement. S/P PPM on 1/16. Afebrile and with down trending leukocytosis. Gram stain from chest tube on the right without organisms. S/P left sided chest tube on 1/18.  Cultures NGTD. Pathology from right sided chest tube without evidence of malignancy.  Continue meropenem until 1/20/24.  Start ceftriaxone 2 gm IV daily plus PO metronidazole 500 mg BID on 1/21/24.  Treat for at least 4 weeks (tentative end date: 2/15/24) from chest tube placement with repeat CXR prior to antibiotic completion to assess if therapy needs to be extended an additional 2 weeks.   Reconsult Infectious Diseases once in 3-4 weeks with repeat CXR.  Discussed management plan with the staff and/or members from SICU service.        Anticipated Disposition: per primary    Thank you for your consult. I will sign off. Please contact " "us if you have any additional questions.    Rosio Rivera MD  Infectious Disease  [facility] - Surgical Intensive Care    51 minutes of total time spent on the encounter, which includes face to face time and non-face to face time preparing to see the patient (eg, review of tests), obtaining and/or reviewing separately obtained history, documenting clinical information in the electronic or other health record, independently interpreting results (not separately reported) and communicating results to the patient/family/caregiver, or care coordination (not separately reported).     Subjective:     Principal Problem:Malignant carcinoid tumor of bronchus    HPI: 73 y.o. female never smoker with DDD, benign essential tremor, meniere's disease, HTN, HLD, DM2, GERD, Celiac's, IBS who is found to have RML Carcinoid tumor presented on 1/2 for lymphadenectomy,  robotic-assisted surgery converted to right thoracotomy with therapeutic wedge resection and open mediastinal lymph node dissection. Stepped up to the unit after converting into afib RVR after dialysis 1/5. She was given 3 doses of metoprolol 5, without any response and her heart rate or rhythm.  The oxygen requirements increased to 5 L from room air.      CT chest on 1/13 revealed mediastinal LAD, Moderate-sized bilateral pleural effusions, increased from 01/08/2024; likely multiply-loculated on the right. Underlying pleural infection or pleural neoplasm not excluded.     ID consulted for: "needs meropenem for right sided loculated pleural effusion, possible IR drainage later this week."    Patient seen and evaluated by Infectious Diseases with recommendations for meropenem while awaiting further work up of the right pleural effusion.    Infectious Diseases re-consulted on 1/16 for "EP needs ok for pacemaker previous to pleural effusion drainage"      Interval History: "Not good". Right sided pleural effusion of unknown etiology. On broad spectrum antibiotics. " Underwent PPM o 1/16. Bradycardia episodes overnight. RIght sided chest tube placement on 1/17. S/P left sided chest tube on 1/18. Cultures NGTD.    Review of Systems   Constitutional:  Positive for fatigue. Negative for chills, fever and unexpected weight change.   HENT:  Negative for ear pain, facial swelling, hearing loss, mouth sores, nosebleeds, rhinorrhea, sinus pressure, sore throat, tinnitus, trouble swallowing and voice change.    Eyes:  Negative for photophobia, pain, redness and visual disturbance.   Respiratory:  Positive for cough and shortness of breath. Negative for choking, chest tightness and wheezing.    Cardiovascular:  Negative for chest pain, palpitations and leg swelling.   Gastrointestinal:  Positive for nausea. Negative for abdominal pain, blood in stool, constipation, diarrhea and vomiting.   Endocrine: Negative for cold intolerance, heat intolerance, polydipsia, polyphagia and polyuria.   Genitourinary:  Negative for decreased urine volume, dysuria, flank pain, frequency, hematuria, menstrual problem, urgency, vaginal bleeding, vaginal discharge and vaginal pain.   Musculoskeletal:  Negative for arthralgias, back pain, joint swelling, myalgias and neck pain.   Skin:  Negative for rash.   Allergic/Immunologic: Negative for environmental allergies, food allergies and immunocompromised state.   Neurological:  Negative for dizziness, seizures, syncope, weakness, light-headedness, numbness and headaches.   Hematological:  Negative for adenopathy. Does not bruise/bleed easily.   Psychiatric/Behavioral:  Negative for confusion, hallucinations, self-injury, sleep disturbance and suicidal ideas. The patient is not nervous/anxious.      Objective:     Vital Signs (Most Recent):  Temp: 97.6 °F (36.4 °C) (01/20/24 0730)  Pulse: 78 (01/20/24 1000)  Resp: 15 (01/20/24 1000)  BP: (!) 91/56 (01/20/24 1000)  SpO2: 100 % (01/20/24 1000) Vital Signs (24h Range):  Temp:  [97.2 °F (36.2 °C)-98.1 °F (36.7 °C)]  97.6 °F (36.4 °C)  Pulse:  [] 78  Resp:  [2-30] 15  SpO2:  [87 %-100 %] 100 %  BP: ()/(38-76) 91/56     Weight: 75.5 kg (166 lb 7.2 oz)  Body mass index is 27.7 kg/m².    Estimated Creatinine Clearance: 36.4 mL/min (based on SCr of 1.4 mg/dL).     Physical Exam  Vitals and nursing note reviewed.   Constitutional:       Appearance: She is well-developed. She is ill-appearing.   HENT:      Head: Normocephalic and atraumatic.      Right Ear: External ear normal.      Left Ear: External ear normal.   Eyes:      General: No scleral icterus.        Right eye: No discharge.         Left eye: No discharge.      Conjunctiva/sclera: Conjunctivae normal.   Cardiovascular:      Rate and Rhythm: Normal rate and regular rhythm.      Heart sounds: Normal heart sounds. No murmur heard.     No friction rub. No gallop.   Pulmonary:      Effort: Pulmonary effort is normal. No respiratory distress.      Breath sounds: No stridor. Examination of the right-middle field reveals rales. Examination of the right-lower field reveals rales. Examination of the left-lower field reveals rales. Rales present. No decreased breath sounds or wheezing.   Abdominal:      General: Bowel sounds are normal.   Musculoskeletal:         General: No tenderness.      Right lower leg: Edema present.      Left lower leg: Edema present.   Skin:     General: Skin is warm and dry.   Neurological:      Mental Status: She is alert and oriented to person, place, and time.          Significant Labs: BMP:   Recent Labs   Lab 01/20/24  0104   *   *   K 4.0      CO2 23   BUN 17   CREATININE 1.4   CALCIUM 7.7*   MG 2.0       CBC:   Recent Labs   Lab 01/19/24  0318 01/20/24  0101 01/20/24  1152   WBC 23.34* 16.54* 18.74*   HGB 7.2* 6.8* 8.6*   HCT 22.5* 21.8* 26.8*    178 205       Microbiology Results (last 7 days)       Procedure Component Value Units Date/Time    AFB culture [[id number]] Collected: 01/18/24 9591    Order Status:  Completed Specimen: Body Fluid from Pleural Fluid Updated: 01/19/24 2128     AFB Culture & Smear Culture in progress     AFB CULTURE STAIN No acid fast bacilli seen.    Narrative:      IR procedure 1/18 L lung    Culture, body fluid - Bactec [[id number]] Collected: 01/18/24 1537    Order Status: Completed Specimen: Body Fluid from Thoracentesis Fluid Updated: 01/19/24 2012     Body Fluid Culture, Sterile No Growth to date      No Growth to date    Narrative:      IR procedure 1/18 L lung    Culture, body fluid - Bactec [[id number]] Collected: 01/17/24 1555    Order Status: Completed Specimen: Body Fluid from Pleural Fluid Updated: 01/19/24 2012     Body Fluid Culture, Sterile No Growth to date      No Growth to date      No Growth to date    AFB culture [[id number]] Collected: 01/17/24 1555    Order Status: Completed Specimen: Body Fluid from Pleural Fluid Updated: 01/18/24 2128     AFB Culture & Smear Culture in progress     AFB CULTURE STAIN No acid fast bacilli seen.    AFB stain [[id number]] Collected: 01/18/24 1538    Order Status: Completed Specimen: Body Fluid from Pleural Fluid Updated: 01/18/24 2048     Direct Acid Fast No acid fast bacilli seen.    Narrative:      IR procedure 1/18 L lung    Gram stain [[id number]] Collected: 01/18/24 1538    Order Status: Completed Specimen: Body Fluid from Pleural Fluid Updated: 01/18/24 2034     Gram Stain Result Rare WBC's      No organisms seen    Narrative:      IR procedure 1/18 L lung    KOH prep [[id number]] Collected: 01/18/24 1538    Order Status: Completed Specimen: Body Fluid from Pleural Fluid Updated: 01/18/24 1932     KOH Prep No yeast or fungal elements seen    Narrative:      IR procedure 1/18 L lung    Fungus culture [[id number]] Collected: 01/18/24 1538    Order Status: Sent Specimen: Body Fluid from Pleural Fluid Updated: 01/18/24 1652    AFB stain [[id number]] Collected: 01/17/24 1555    Order Status: Completed Specimen: Body Fluid from Pleural Fluid  Updated: 01/17/24 2012     Direct Acid Fast No acid fast bacilli seen.    Gram stain [[id number]] Collected: 01/17/24 1555    Order Status: Completed Specimen: Body Fluid from Pleural Fluid Updated: 01/17/24 1848     Gram Stain Result No WBC's      No organisms seen    JADEN prep [[id number]] Collected: 01/17/24 1555    Order Status: Completed Specimen: Body Fluid from Pleural Fluid Updated: 01/17/24 1836     KOH Prep No yeast or fungal elements seen    Fungus culture [[id number]] Collected: 01/17/24 1555    Order Status: Sent Specimen: Body Fluid from Pleural Fluid Updated: 01/17/24 1639    Blood culture [[id number]] Collected: 01/12/24 0411    Order Status: Completed Specimen: Blood from Peripheral, Foot, Left Updated: 01/17/24 0812     Blood Culture, Routine No growth after 5 days.    Culture, Respiratory with Gram Stain [[id number]]  (Abnormal)  (Susceptibility) Collected: 01/11/24 2030    Order Status: Completed Specimen: Sputum, Induced Updated: 01/15/24 0925     Respiratory Culture No S aureus isolated.      KLEBSIELLA PNEUMONIAE  Many        ESCHERICHIA COLI  Many  Normal respiratory ramu also present       Gram Stain (Respiratory) <10 epithelial cells per low power field.     Gram Stain (Respiratory) Moderate WBC's     Gram Stain (Respiratory) Many Gram negative rods     Gram Stain (Respiratory) Rare Gram positive cocci    Blood culture [[id number]]  (Abnormal) Collected: 01/11/24 1601    Order Status: Completed Specimen: Blood from Peripheral, Antecubital, Right Updated: 01/14/24 0812     Blood Culture, Routine Gram stain shahriar bottle: Gram positive cocci in clusters resembling Staph      Results called to and read back by: Gena UGNER RN 01/12/2024  18:43      COAGULASE-NEGATIVE STAPHYLOCOCCUS SPECIES  Organism is a probable contaminant              Significant Imaging: I have reviewed all pertinent imaging results/findings within the past 24 hours.

## 2024-01-20 NOTE — PROGRESS NOTES
[facility]  Thoracic Surgery  Progress Note    Subjective:     History of Present Illness:  No notes on file    Post-Op Info:  Procedure(s) (LRB):  INSERTION, CARDIAC PACEMAKER, LEADLESS (N/A)   4 Days Post-Op     Interval History: s/p micra placement with EP 1/16. Pacer set to 60 - intrinsi rhythm improving with less needd for pacing. She feels better.   S/p L IR chest tube placement, 1800 cc output, serosanguinous. 570 cc from R CT. CXR looks clear.   Ate better yesterday   For HD trial today      Medications:  Continuous Infusions:   sodium chloride 0.9% 10 mL/hr at 01/16/24 1200     Scheduled Meds:   droNABinol  5 mg Oral BID    epoetin noble (PROCRIT) injection  4,000 Units Intravenous Every Mon, Wed, Fri    insulin detemir U-100  6 Units Subcutaneous Daily    LIDOcaine  1 patch Transdermal Q24H    meropenem (MERREM) IVPB  500 mg Intravenous Q12H    oxybutynin  5 mg Oral TID    pantoprazole  40 mg Oral Daily    polyethylene glycol  17 g Oral Daily    senna-docusate 8.6-50 mg  1 tablet Oral Daily     PRN Meds:sodium chloride 0.9%, albuterol sulfate, bisacodyL, dextrose 10%, dextrose 10%, glucagon (human recombinant), glucose, glucose, heparin (porcine), hydrALAZINE, insulin aspart U-100, ondansetron, sodium chloride 0.9%     Review of patient's allergies indicates:   Allergen Reactions    Crestor [rosuvastatin] Swelling    Gluten protein      Objective:     Vital Signs (Most Recent):  Temp: 98 °F (36.7 °C) (01/19/24 0701)  Pulse: 88 (01/19/24 0900)  Resp: (!) 35 (01/19/24 0900)  BP: (!) 98/54 (01/19/24 0900)  SpO2: 96 % (01/19/24 0900) Vital Signs (24h Range):  Temp:  [97.3 °F (36.3 °C)-98 °F (36.7 °C)] 98 °F (36.7 °C)  Pulse:  [75-94] 88  Resp:  [5-43] 35  SpO2:  [88 %-100 %] 96 %  BP: ()/(51-70) 98/54     Intake/Output - Last 3 Shifts         01/17 0700 01/18 0659 01/18 0700 01/19 0659 01/19 0700 01/20 0659    I.V. (mL/kg) 1354.3 (17.6) 1045 (13.6)     IV Piggyback 199.6 247.6      Total Intake(mL/kg) 1553.9 (20.2) 1292.6 (16.8)     Other 2621 1043     Chest Tube 1460 2370 45    Total Output 4081 3413 45    Net -2527.1 -2120.4 -45                   SpO2: 96 %       Physical Exam  Vitals and nursing note reviewed.   Constitutional:       Appearance: Normal appearance.   HENT:      Head: Normocephalic and atraumatic.      Nose: Nose normal.   Cardiovascular:      Rate and Rhythm: Normal rate and regular rhythm.      Pulses: Normal pulses.   Pulmonary:      Effort: Pulmonary effort is normal. No respiratory distress.      Comments: Right posterior chest incision c/d/I    Right chest permacath    Right Chest tube, 10 fr, serosanguinous output, waterseal   Left chest tube, 10 fr, serosanguinous output, waterseal   Abdominal:      General: Abdomen is flat. There is no distension.      Palpations: Abdomen is soft.      Tenderness: There is no abdominal tenderness.      Comments: Soft, nontender abdomen   Musculoskeletal:      Right lower leg: Edema present.      Left lower leg: Edema present.   Skin:     General: Skin is warm and dry.      Coloration: Skin is pale.      Comments: Groin soft, dressings c/d/i   Neurological:      General: No focal deficit present.      Mental Status: She is alert.            Significant Labs:  CBC:   Recent Labs   Lab 01/19/24  0318   WBC 23.34*   RBC 2.46*   HGB 7.2*   HCT 22.5*      MCV 92   MCH 29.3   MCHC 32.0         Significant Diagnostics:  I have reviewed and interpreted all pertinent imaging results/findings within the past 24 hours.    VTE Risk Mitigation (From admission, onward)           Ordered     heparin 25,000 units in dextrose 5% 250 mL (100 units/mL) infusion LOW INTENSITY nomogram - [facility]  Continuous        Question:  Begin at (units/kg/hr)  Answer:  16    01/15/24 1038     heparin (porcine) injection 1,000 Units  As needed (PRN)         01/11/24 1351     heparin (porcine) injection 1,000 Units  As needed (PRN)         01/03/24 1123     IP  VTE HIGH RISK PATIENT  Once         01/02/24 1226     Place CARMELITA hose  Until discontinued         01/02/24 1226     Place sequential compression device  Until discontinued         01/02/24 1226                  Assessment/Plan:     * Malignant carcinoid tumor of bronchus  74 yo female with ESRD (TTS), DDD, benign essential tremor, meniere's disease, HTN, HLD, DM2, GERD, Celiac's, IBS and 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 Stepped up to the ICU 1/5 for afib RVR which was refractory to medical management and c/b development of tachy-eulogio syndrome now s/p micra placement 1/16 for tachy-eulogio syndrome in the setting of RVR. Did have brief SBO type picture 1/7 which resolved with conservative management.     - s/p R thoracotomy with R lower lobectomy for carcinoid w/ MLND; IPV injury requiring repair. CT removed Post operatively.  - Refractory afib RVR requiring ICU step up 1/05, s/p chemical cardioversion initially with recurrence c/b developing tachy-eulogio syndrome     - cards and EP following, appreciate assistance     - s/p TVP placement and now micra implantation 1/16    - hold hep gtt 5 days post op  - CT chest 1/13 w/ bilateral pleural effusion (R>L), pericardial effusion,   - s/p R chest tube placement with IR 1/17, L chest tube placement 1/18  - Pulmonary toilet: IS, Acapella, Mucomyst daily   - Infectious work up 2/2 leukocytosis. She remains afebrile. Effusions and compressive atelectasis also likely contributing to leukocytosis.Trended up to 30k 1/16, now downtrending. Zosyn 1/11-1/15; Meropenam 1/16 -    - ID following, appreciate assistance. Reconsult following thora results.   - Bcx 1/11: Staph, probable contaminant  - Bcx1/12: NGTD   - Rapid blood PCR: staph epi  - Sputum Cx 1/11: klebsiella & e. Coli  - R pleural fluid Cx 1/17: NGTD  - SBO type episode 1/07 requiring NGT decompression, which resolved with conservative  management   - Renal, gluten free diet, novasource renal BID; marinol for appetite   - Bowel regimen, PPI  - Nephrology following, appreciate assistance. CRRT 1/17 overnight. HD trial today.   - Oxybutinin for bladder spasms   - MM pain control as needed  - Needs PT/OT, mobilization, likely post hospital placement         Dispo: continue ICU care; work towards stepdown/placement as arrythmias come under control and she begins to normalize          Kat Flores MD  Thoracic Surgery  [facility]

## 2024-01-20 NOTE — ASSESSMENT & PLAN NOTE
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 Stepped up to the ICU 1/5 for afib RVR which was refractory to medical management and c/b development of tachy-eulogio syndrome now s/p micra placement 1/16 for tachy-eulogio syndrome in the setting of RVR. Did have brief SBO type picture 1/7 which resolved with conservative management.     - s/p R thoracotomy with R lower lobectomy for carcinoid w/ MLND; IPV injury requiring repair. CT removed Post operatively.  - Refractory afib RVR requiring ICU step up 1/05, s/p chemical cardioversion initially with recurrence c/b developing tachy-eulogio syndrome     - cards and EP following, appreciate assistance     - s/p TVP placement and now micra implantation 1/16    - hold hep gtt 5 days post op  - CT chest 1/13 w/ bilateral pleural effusion (R>L), pericardial effusion,   - s/p R chest tube placement with IR 1/17, L chest tube placement 1/18  - Pulmonary toilet: IS, Acapella, Mucomyst daily   - Infectious work up 2/2 leukocytosis. She remains afebrile. Effusions and compressive atelectasis also likely contributing to leukocytosis.Trended up to 30k 1/16, now downtrending. Zosyn 1/11-1/15; Meropenam 1/16 -    - ID following, appreciate assistance. Reconsult following thora results.   - Bcx 1/11: Staph, probable contaminant  - Bcx1/12: NGTD   - Rapid blood PCR: staph epi  - Sputum Cx 1/11: klebsiella & e. Coli  - R pleural fluid Cx 1/17: NGTD  - SBO type episode 1/07 requiring NGT decompression, which resolved with conservative management   - Renal, gluten free diet, novasource renal BID; marinol for appetite   - Bowel regimen, PPI  - Nephrology following, appreciate assistance. CRRT 1/17 overnight. HD trial today.   - Oxybutinin for bladder spasms   - MM pain control as needed  - Needs PT/OT,  mobilization, likely post hospital placement         Dispo: continue ICU care; work towards stepdown/placement as arrythmias come under control and she begins to normalize

## 2024-01-20 NOTE — SUBJECTIVE & OBJECTIVE
Interval History/Significant Events: Patient developed A-fib w/ RVR in the 120s yesterday around 1645. She was given Metoprolol 5mg IV push x3 which resolved her RVR. She converted to NSR overnight after receiving LR bolus and 1u pRBC for Hb 6.8.    Follow-up For: Procedure(s) (LRB):  INSERTION, CARDIAC PACEMAKER, LEADLESS (N/A)    Post-Operative Day: 4 Days Post-Op    Objective:     Vital Signs (Most Recent):  Temp: 97.6 °F (36.4 °C) (01/20/24 0730)  Pulse: 78 (01/20/24 1000)  Resp: 15 (01/20/24 1000)  BP: (!) 91/56 (01/20/24 1000)  SpO2: 100 % (01/20/24 1000) Vital Signs (24h Range):  Temp:  [97.2 °F (36.2 °C)-98.1 °F (36.7 °C)] 97.6 °F (36.4 °C)  Pulse:  [] 78  Resp:  [2-30] 15  SpO2:  [87 %-100 %] 100 %  BP: ()/(38-76) 91/56     Weight: 75.5 kg (166 lb 7.2 oz)  Body mass index is 27.7 kg/m².      Intake/Output Summary (Last 24 hours) at 1/20/2024 1154  Last data filed at 1/20/2024 0900  Gross per 24 hour   Intake 2715.03 ml   Output 870 ml   Net 1845.03 ml          Physical Exam  Vitals and nursing note reviewed.   Constitutional:       General: She is not in acute distress.     Appearance: Normal appearance. She is normal weight. She is ill-appearing. She is not toxic-appearing.   HENT:      Head: Normocephalic and atraumatic.      Right Ear: External ear normal.      Left Ear: External ear normal.      Nose: Nose normal.      Comments: Nasal cannula in place  Eyes:      General: No scleral icterus.        Right eye: No discharge.         Left eye: No discharge.      Extraocular Movements: Extraocular movements intact.      Conjunctiva/sclera: Conjunctivae normal.   Cardiovascular:      Rate and Rhythm: Normal rate and regular rhythm.   Pulmonary:      Effort: Pulmonary effort is normal. No respiratory distress.      Comments: L chest tube in place  R chest tube in place  Abdominal:      General: Abdomen is flat. There is no distension.      Palpations: Abdomen is soft. There is no mass.       Tenderness: There is no abdominal tenderness. There is no guarding.      Hernia: No hernia is present.   Genitourinary:     Comments: Groin incisions covered with gauze and Tegaderm bilaterally, no breakthrough bleeding or hematomas noted  Musculoskeletal:         General: Swelling (BUE edema) present. Normal range of motion.      Cervical back: Normal range of motion and neck supple.   Skin:     General: Skin is warm and dry.   Neurological:      General: No focal deficit present.      Mental Status: She is alert and oriented to person, place, and time. Mental status is at baseline.   Psychiatric:         Mood and Affect: Mood normal.         Behavior: Behavior normal.         Thought Content: Thought content normal.            Vents:  Oxygen Concentration (%): 24 (01/15/24 0200)    Lines/Drains/Airways       Central Venous Catheter Line  Duration                  Hemodialysis Catheter 01/25/23 1501 right internal jugular 359 days              Drain  Duration                  Chest Tube 01/17/24 1504 Right Pleural 10 Fr. 2 days         Chest Tube 01/18/24 1537 Left Pleural 10 Fr. 1 day              Peripheral Intravenous Line  Duration                  Hemodialysis AV Fistula 09/01/23 0800 Left forearm 141 days         Peripheral IV - Single Lumen 01/15/24 1230 20 G;1 3/4 in Anterior;Proximal;Right Forearm 4 days         Peripheral IV - Single Lumen 01/20/24 0128 20 G Right Antecubital <1 day                    Significant Labs:    CBC/Anemia Profile:  Recent Labs   Lab 01/19/24  0318 01/20/24  0101   WBC 23.34* 16.54*   HGB 7.2* 6.8*   HCT 22.5* 21.8*    178   MCV 92 94   RDW 21.7* 22.0*        Chemistries:  Recent Labs   Lab 01/19/24  0318 01/19/24  1513 01/20/24  0104     133* 137 134*   K 4.8  4.7 3.4* 4.0     105 107 106   CO2 22*  22* 25 23   BUN 19  18 16 17   CREATININE 1.7*  1.6* 1.2 1.4   CALCIUM 8.0*  8.1* 8.3* 7.7*   ALBUMIN 1.9*  1.9* 1.9* 1.7*   PROT 4.7*  --  4.5*   BILITOT  0.7  --  0.5   ALKPHOS 133  --  104   ALT 12  --  11   AST 19  --  17   MG 2.3 2.1  2.1 2.0   PHOS 2.7  2.6* 1.4* 1.9*       All pertinent labs within the past 24 hours have been reviewed.    Significant Imaging:  I have reviewed all pertinent imaging results/findings within the past 24 hours.

## 2024-01-20 NOTE — PROGRESS NOTES
[facility]  Cardiac Electrophysiology  Progress Note    Admission Date: 1/2/2024  Code Status: Full Code   Attending Physician: Tan Thomson MD   Expected Discharge Date: 1/26/2024  Principal Problem:Malignant carcinoid tumor of bronchus    Subjective:     Interval History: NAEON. Had episode of AF with RVR yesterday evening that converted to NSR with metoprolol IV X3. Currently on midodrine. No further tele events. Chest tube in place.     Review of Systems   Constitutional: Negative for diaphoresis and fever.   Cardiovascular:  Negative for chest pain, dyspnea on exertion, leg swelling, near-syncope, orthopnea, palpitations, paroxysmal nocturnal dyspnea and syncope.   Respiratory:  Negative for cough and shortness of breath.    Gastrointestinal:  Negative for abdominal pain, diarrhea, nausea and vomiting.   Neurological:  Negative for light-headedness.   Psychiatric/Behavioral:  Negative for altered mental status and substance abuse.      Objective:     Vital Signs (Most Recent):  Temp: 97.6 °F (36.4 °C) (01/20/24 0730)  Pulse: 78 (01/20/24 1000)  Resp: 15 (01/20/24 1000)  BP: (!) 91/56 (01/20/24 1000)  SpO2: 100 % (01/20/24 1000) Vital Signs (24h Range):  Temp:  [97.2 °F (36.2 °C)-98.1 °F (36.7 °C)] 97.6 °F (36.4 °C)  Pulse:  [] 78  Resp:  [2-30] 15  SpO2:  [87 %-100 %] 100 %  BP: ()/(38-76) 91/56     Weight: 75.5 kg (166 lb 7.2 oz)  Body mass index is 27.7 kg/m².     SpO2: 100 %        Physical Exam  Constitutional:       Appearance: Normal appearance.   Cardiovascular:      Rate and Rhythm: Normal rate and regular rhythm.      Pulses: Normal pulses.      Heart sounds: Normal heart sounds.   Pulmonary:      Effort: Pulmonary effort is normal.      Breath sounds: Normal breath sounds. No wheezing or rales.      Comments: Chest tubes in place  Abdominal:      General: Abdomen is flat. There is no distension.      Palpations: Abdomen is soft.      Tenderness: There is no  abdominal tenderness.   Musculoskeletal:      Right lower leg: No edema.      Left lower leg: No edema.   Skin:     General: Skin is warm and dry.   Neurological:      Mental Status: She is alert and oriented to person, place, and time. Mental status is at baseline.   Psychiatric:         Mood and Affect: Mood normal.         Behavior: Behavior normal.            Significant Labs: BMP:   Recent Labs   Lab 01/19/24  0318 01/19/24  1513 01/20/24  0104   *  138* 147* 152*     133* 137 134*   K 4.8  4.7 3.4* 4.0     105 107 106   CO2 22*  22* 25 23   BUN 19  18 16 17   CREATININE 1.7*  1.6* 1.2 1.4   CALCIUM 8.0*  8.1* 8.3* 7.7*   MG 2.3 2.1  2.1 2.0   , CMP:   Recent Labs   Lab 01/19/24 0318 01/19/24  1513 01/20/24  0104     133* 137 134*   K 4.8  4.7 3.4* 4.0     105 107 106   CO2 22*  22* 25 23   *  138* 147* 152*   BUN 19  18 16 17   CREATININE 1.7*  1.6* 1.2 1.4   CALCIUM 8.0*  8.1* 8.3* 7.7*   PROT 4.7*  --  4.5*   ALBUMIN 1.9*  1.9* 1.9* 1.7*   BILITOT 0.7  --  0.5   ALKPHOS 133  --  104   AST 19  --  17   ALT 12  --  11   ANIONGAP 7*  6* 5* 5*   , and CBC:   Recent Labs   Lab 01/19/24 0318 01/20/24  0101   WBC 23.34* 16.54*   HGB 7.2* 6.8*   HCT 22.5* 21.8*    178         Assessment and Plan:     Atrial fibrillation  #Bradycardia    73yoF with ESRD admitted for bronchial tumor removal with post op course complicated by atrial fibrillation. She had difficult to control rates but further post-operatively, her rates improved so was weaned off rate controlling agents. Overnight 1/11-1/12 and morning of 1/12 she had several bradycardic episodes in the setting of atrial fibrillation, recent digoxin use (reportedly renally dosed, but some high levels with ESRD).    In the setting of asystolic events with several short rounds of compressions (no more than 1 round at a time), TVP was attempted. Failed left IJ without acute complications (venous strictures  present, prior tunneled line in the area), and taken to interventional lab for femoral access TVP placement.     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 She is post PTA and stenting of the left innominate vein. This was done at an outside hospital. Micra implantation was decided on due to infection risk and difficult venous anatomy.    EF 60-65% during this admission  Jan 13, 2024 EKG: Sinus rhythm, QRS 76    Recommendations:  - Micra in placed 1/16  - S/p chest tube 1/17, contralateral chest tube 1/18  - When done with procedures can reset her Micra back to VDD possibly with a higher backup rate        Misael Moody MD  Cardiac Electrophysiology  [facility]

## 2024-01-20 NOTE — ASSESSMENT & PLAN NOTE
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 Stepped up to the ICU 1/5 for afib RVR which was refractory to medical management and c/b development of tachy-eulogio syndrome now s/p micra placement 1/16 for tachy-eulogio syndrome in the setting of RVR. Did have brief SBO type picture 1/7 which resolved with conservative management.     - s/p R thoracotomy with R lower lobectomy for carcinoid w/ MLND; IPV injury requiring repair. CT removed Post operatively.  - Refractory afib RVR requiring ICU step up 1/05, s/p chemical cardioversion initially with recurrence c/b developing tachy-eulogio syndrome     - cards and EP following, appreciate assistance     - s/p TVP placement and now micra implantation 1/16    - hold hep gtt 5 days post op  - CT chest 1/13 w/ bilateral pleural effusion (R>L), pericardial effusion,   - s/p R chest tube placement with IR 1/17, L chest tube placement 1/18; Keep today and may stay to waterseal  - Daily CXR while CT are in place   - Pulmonary toilet: IS, Acapella, Mucomyst daily   - Infectious work up 2/2 leukocytosis. She remains afebrile. Effusions and compressive atelectasis also likely contributing to leukocytosis.Trended up to 30k 1/16, now downtrending. Zosyn 1/11-1/15; Meropenam 1/16 - 1/20; Plan for Ceftriaxone and metronidazole to start 1/21 per ID     - ID following, appreciate assistance. Reconsult following thora results.   - Bcx 1/11: Staph, probable contaminant  - Bcx1/12: NGTD   - Rapid blood PCR: staph epi  - Sputum Cx 1/11: klebsiella & e. Coli  - R pleural fluid Cx 1/17: NGTD  - SBO type episode 1/07 requiring NGT decompression, which resolved with conservative management   - Renal, gluten free diet, novasource renal BID; marinol for appetite   - Bowel regimen, PPI  - Nephrology following,  appreciate assistance. CRRT 1/17 overnight. HD trial 1/19 passed but requiring midodrine, 1.5 L LR, metoprolol for RVR.    - Oxybutinin for bladder spasms   - MM pain control as needed  - Needs PT/OT, mobilization, likely post hospital placement         Dispo: remain in ICU for repeat HD trial with midodrine prior to dialysis.

## 2024-01-20 NOTE — SUBJECTIVE & OBJECTIVE
Interval History:   HD trial yesterday - required metoprolol IV pushes and 1.5 L LR in addition to midodrine  Ate well, no abdominal pain  R CT - 295 cc, L CT - 455 cc        Medications:  Continuous Infusions:      Scheduled Meds:   droNABinol  5 mg Oral BID    epoetin noble (PROCRIT) injection  4,000 Units Intravenous Every Mon, Wed, Fri    insulin detemir U-100  6 Units Subcutaneous Daily    LIDOcaine  1 patch Transdermal Q24H    meropenem (MERREM) IVPB  500 mg Intravenous Q12H    midodrine  5 mg Oral BID WM    oxybutynin  5 mg Oral TID    pantoprazole  40 mg Oral Daily    polyethylene glycol  17 g Oral Daily    senna-docusate 8.6-50 mg  1 tablet Oral Daily     PRN Meds:0.9%  NaCl infusion (for blood administration), sodium chloride 0.9%, albuterol sulfate, bisacodyL, dextrose 10%, dextrose 10%, glucagon (human recombinant), glucose, glucose, heparin (porcine), hydrALAZINE, insulin aspart U-100, midodrine, ondansetron, sodium chloride 0.9%     Review of patient's allergies indicates:   Allergen Reactions    Crestor [rosuvastatin] Swelling    Gluten protein      Objective:     Vital Signs (Most Recent):  Temp: 97.6 °F (36.4 °C) (01/20/24 0730)  Pulse: 75 (01/20/24 0730)  Resp: 17 (01/20/24 0730)  BP: (!) 103/56 (01/20/24 0730)  SpO2: 97 % (01/20/24 0730) Vital Signs (24h Range):  Temp:  [97.2 °F (36.2 °C)-98.1 °F (36.7 °C)] 97.6 °F (36.4 °C)  Pulse:  [] 75  Resp:  [2-35] 17  SpO2:  [87 %-100 %] 97 %  BP: ()/(38-59) 103/56     Intake/Output - Last 3 Shifts         01/18 0700  01/19 0659 01/19 0700 01/20 0659 01/20 0700 01/21 0659    I.V. (mL/kg) 1045 (13.6)  1.5 (0)    Blood  542.5     IV Piggyback 247.6 1691     Total Intake(mL/kg) 1292.6 (16.8) 2233.5 (29.6) 1.5 (0)    Other 1043      Chest Tube 2370 750     Total Output 3413 750     Net -2120.4 +1483.5 +1.5                   SpO2: 97 %        Physical Exam  Vitals and nursing note reviewed.   Constitutional:       Appearance: Normal appearance.    HENT:      Head: Normocephalic and atraumatic.      Nose: Nose normal.   Cardiovascular:      Rate and Rhythm: Normal rate and regular rhythm.      Pulses: Normal pulses.   Pulmonary:      Effort: Pulmonary effort is normal. No respiratory distress.      Comments: Right posterior chest incision c/d/I    Right chest permacath    Right Chest tube, 10 fr, serosanguinous output, waterseal   Left chest tube, 10 fr, serosanguinous output, waterseal   Abdominal:      General: Abdomen is flat. There is no distension.      Palpations: Abdomen is soft.      Tenderness: There is no abdominal tenderness.      Comments: Soft, nontender abdomen   Musculoskeletal:      Right lower leg: Edema present.      Left lower leg: Edema present.   Skin:     General: Skin is warm and dry.      Coloration: Skin is pale.      Comments: Groin soft, dressings c/d/i   Neurological:      General: No focal deficit present.      Mental Status: She is alert.            Significant Labs:  CBC:   Recent Labs   Lab 01/20/24  0101   WBC 16.54*   RBC 2.32*   HGB 6.8*   HCT 21.8*      MCV 94   MCH 29.3   MCHC 31.2*         Significant Diagnostics:  I have reviewed and interpreted all pertinent imaging results/findings within the past 24 hours.    VTE Risk Mitigation (From admission, onward)           Ordered     heparin 25,000 units in dextrose 5% 250 mL (100 units/mL) infusion LOW INTENSITY nomogram - [facility]  Continuous        Question:  Begin at (units/kg/hr)  Answer:  16    01/15/24 1038     heparin (porcine) injection 1,000 Units  As needed (PRN)         01/11/24 1351     heparin (porcine) injection 1,000 Units  As needed (PRN)         01/03/24 1123     IP VTE HIGH RISK PATIENT  Once         01/02/24 1226     Place CARMELITA hose  Until discontinued         01/02/24 1226     Place sequential compression device  Until discontinued         01/02/24 1226

## 2024-01-20 NOTE — PROGRESS NOTES
[facility]  Thoracic Surgery  Progress Note    Subjective:     History of Present Illness:  No notes on file    Post-Op Info:  Procedure(s) (LRB):  INSERTION, CARDIAC PACEMAKER, LEADLESS (N/A)   4 Days Post-Op     Interval History:   HD trial yesterday - required metoprolol IV pushes and 1.5 L LR in addition to midodrine  Ate well, no abdominal pain  R CT - 295 cc, L CT - 455 cc        Medications:  Continuous Infusions:      Scheduled Meds:   droNABinol  5 mg Oral BID    epoetin noble (PROCRIT) injection  4,000 Units Intravenous Every Mon, Wed, Fri    insulin detemir U-100  6 Units Subcutaneous Daily    LIDOcaine  1 patch Transdermal Q24H    meropenem (MERREM) IVPB  500 mg Intravenous Q12H    midodrine  5 mg Oral BID WM    oxybutynin  5 mg Oral TID    pantoprazole  40 mg Oral Daily    polyethylene glycol  17 g Oral Daily    senna-docusate 8.6-50 mg  1 tablet Oral Daily     PRN Meds:0.9%  NaCl infusion (for blood administration), sodium chloride 0.9%, albuterol sulfate, bisacodyL, dextrose 10%, dextrose 10%, glucagon (human recombinant), glucose, glucose, heparin (porcine), hydrALAZINE, insulin aspart U-100, midodrine, ondansetron, sodium chloride 0.9%     Review of patient's allergies indicates:   Allergen Reactions    Crestor [rosuvastatin] Swelling    Gluten protein      Objective:     Vital Signs (Most Recent):  Temp: 97.6 °F (36.4 °C) (01/20/24 0730)  Pulse: 75 (01/20/24 0730)  Resp: 17 (01/20/24 0730)  BP: (!) 103/56 (01/20/24 0730)  SpO2: 97 % (01/20/24 0730) Vital Signs (24h Range):  Temp:  [97.2 °F (36.2 °C)-98.1 °F (36.7 °C)] 97.6 °F (36.4 °C)  Pulse:  [] 75  Resp:  [2-35] 17  SpO2:  [87 %-100 %] 97 %  BP: ()/(38-59) 103/56     Intake/Output - Last 3 Shifts         01/18 0700 01/19 0659 01/19 0700 01/20 0659 01/20 0700 01/21 0659    I.V. (mL/kg) 1045 (13.6)  1.5 (0)    Blood  542.5     IV Piggyback 247.6 1691     Total Intake(mL/kg) 1292.6 (16.8) 2233.5 (29.6) 1.5 (0)     Other 1043      Chest Tube 2370 750     Total Output 3413 750     Net -2120.4 +1483.5 +1.5                   SpO2: 97 %       Physical Exam  Vitals and nursing note reviewed.   Constitutional:       Appearance: Normal appearance.   HENT:      Head: Normocephalic and atraumatic.      Nose: Nose normal.   Cardiovascular:      Rate and Rhythm: Normal rate and regular rhythm.      Pulses: Normal pulses.   Pulmonary:      Effort: Pulmonary effort is normal. No respiratory distress.      Comments: Right posterior chest incision c/d/I    Right chest permacath    Right Chest tube, 10 fr, serosanguinous output, waterseal   Left chest tube, 10 fr, serosanguinous output, waterseal   Abdominal:      General: Abdomen is flat. There is no distension.      Palpations: Abdomen is soft.      Tenderness: There is no abdominal tenderness.      Comments: Soft, nontender abdomen   Musculoskeletal:      Right lower leg: Edema present.      Left lower leg: Edema present.   Skin:     General: Skin is warm and dry.      Coloration: Skin is pale.      Comments: Groin soft, dressings c/d/i   Neurological:      General: No focal deficit present.      Mental Status: She is alert.            Significant Labs:  CBC:   Recent Labs   Lab 01/20/24  0101   WBC 16.54*   RBC 2.32*   HGB 6.8*   HCT 21.8*      MCV 94   MCH 29.3   MCHC 31.2*         Significant Diagnostics:  I have reviewed and interpreted all pertinent imaging results/findings within the past 24 hours.    VTE Risk Mitigation (From admission, onward)           Ordered     heparin 25,000 units in dextrose 5% 250 mL (100 units/mL) infusion LOW INTENSITY nomogram - [facility]  Continuous        Question:  Begin at (units/kg/hr)  Answer:  16    01/15/24 1038     heparin (porcine) injection 1,000 Units  As needed (PRN)         01/11/24 1351     heparin (porcine) injection 1,000 Units  As needed (PRN)         01/03/24 1123     IP VTE HIGH RISK PATIENT  Once         01/02/24 1226     Place  CARMELITA hose  Until discontinued         01/02/24 1226     Place sequential compression device  Until discontinued         01/02/24 1226                  Assessment/Plan:     * Malignant carcinoid tumor of bronchus  72 yo female with ESRD (TTS), DDD, benign essential tremor, meniere's disease, HTN, HLD, DM2, GERD, Celiac's, IBS and 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 Stepped up to the ICU 1/5 for afib RVR which was refractory to medical management and c/b development of tachy-eulogio syndrome now s/p micra placement 1/16 for tachy-eulogio syndrome in the setting of RVR. Did have brief SBO type picture 1/7 which resolved with conservative management.     - s/p R thoracotomy with R lower lobectomy for carcinoid w/ MLND; IPV injury requiring repair. CT removed Post operatively.  - Refractory afib RVR requiring ICU step up 1/05, s/p chemical cardioversion initially with recurrence c/b developing tachy-eulogio syndrome     - cards and EP following, appreciate assistance     - s/p TVP placement and now micra implantation 1/16    - hold hep gtt 5 days post op  - CT chest 1/13 w/ bilateral pleural effusion (R>L), pericardial effusion,   - s/p R chest tube placement with IR 1/17, L chest tube placement 1/18; Keep today and may stay to waterseal  - Daily CXR while CT are in place   - Pulmonary toilet: IS, Acapella, Mucomyst daily   - Infectious work up 2/2 leukocytosis. She remains afebrile. Effusions and compressive atelectasis also likely contributing to leukocytosis.Trended up to 30k 1/16, now downtrending. Zosyn 1/11-1/15; Meropenam 1/16 - 1/20; Plan for Ceftriaxone and metronidazole to start 1/21 per ID     - ID following, appreciate assistance. Reconsult following thora results.   - Bcx 1/11: Staph, probable contaminant  - Bcx1/12: NGTD   - Rapid blood PCR: staph epi  - Sputum Cx 1/11: klebsiella & e. Coli  - R pleural fluid Cx 1/17: NGTD  - SBO  type episode 1/07 requiring NGT decompression, which resolved with conservative management   - Renal, gluten free diet, novasource renal BID; marinol for appetite   - Bowel regimen, PPI  - Nephrology following, appreciate assistance. CRRT 1/17 overnight. HD trial 1/19 passed but requiring midodrine, 1.5 L LR, metoprolol for RVR.    - Oxybutinin for bladder spasms   - MM pain control as needed  - Needs PT/OT, mobilization, likely post hospital placement         Dispo: remain in ICU for repeat HD trial with midodrine prior to dialysis.         Kat Flores MD  Thoracic Surgery  [facility]

## 2024-01-20 NOTE — PROGRESS NOTES
[facility]  Endocrinology  Progress Note    Admit Date: 2024     Reason for Consult: Management of T2DM, Hyperglycemia     Surgical Procedure and Date: 24--   LYMPHADENECTOMY (Right)  THORACOTOMY (Right)  THORACOTOMY, WITH INITIAL THERAPEUTIC WEDGE RESECTION OF LUNG (Right)    Diabetes diagnosis year: in her 50s    Home Diabetes Medications:    LDC SSI   Blood sugar 150 to 200, + 1 unit  Blood sugar 201 to 250, + 2 units  Blood sugar 251 to 300, + 3 units  Blood sugar 301 to 350, + 4 units   Blood sugar greater than 350, + 5 units    How often checking glucose at home? >4 x day   BG readings on regimen: 100-120s  Hypoglycemia on the regimen?  denies   Missed doses on regimen?  No    Diabetes Complications include:     Nephropathy     Complicating diabetes co morbidities:   ESRD and Glucocorticoid use       HPI:   Patient is a 73 y.o. female with DDD, benign essential tremor, meniere's disease, HTN, HLD, DM2, GERD, Celiac's, IBS who is found to have RML Carcinoid tumor.  Chest CT 10/18/23 revealed 1.2 x 1.1 cm right middle lobe nodule, previously 1.2 x 1.0 cm in 2023. Underwent Robotic Bronchoscopy 10/20/23; path: RML nodule positive for well-differentiated neuroendocrine neoplasm, carcinoid tumor. PET/CT Cu64 23 showed known RML nodule with hypermetabolic activity. Now presents s/p thoracotomy with wedge resection of right lung. Endocrine consulted for BG management.         Interval HPI:   No acute events overnight. Patient in room 41351 CVICU/78748 CVICU A. Blood glucose stable. BG at goal on current insulin regimen (SSI and basal). Steroid use- None.   3 Days Post-Op  Renal function- Abnormal - cr 1.7    Vasopressors-  None     Diet diabetic Soft & Bite Sized (IDDSI Level 6), Gluten Free, Renal; 2000 Calorie; Standard Tray     Eatin%  Nausea: No  Hypoglycemia and intervention: No  Fever: No  TPN and/or TF: No      BP (!) 85/51   Pulse 94   Temp 98 °F (36.7 °C)  "(Oral)   Resp 16   Ht 5' 5" (1.651 m)   Wt 77 kg (169 lb 12.1 oz)   LMP  (LMP Unknown) Comment: BEAU/ NO BSO  1986  SpO2 98%   Breastfeeding No   BMI 28.25 kg/m²     Labs Reviewed and Include    Recent Labs   Lab 01/19/24 0318   *  138*   CALCIUM 8.0*  8.1*   ALBUMIN 1.9*  1.9*   PROT 4.7*     133*   K 4.8  4.7   CO2 22*  22*     105   BUN 19  18   CREATININE 1.7*  1.6*   ALKPHOS 133   ALT 12   AST 19   BILITOT 0.7     Lab Results   Component Value Date    WBC 23.34 (H) 01/19/2024    HGB 7.2 (L) 01/19/2024    HCT 22.5 (L) 01/19/2024    MCV 92 01/19/2024     01/19/2024     No results for input(s): "TSH", "FREET4" in the last 168 hours.  Lab Results   Component Value Date    HGBA1C 4.9 11/15/2023       Nutritional status:   Body mass index is 28.25 kg/m².  Lab Results   Component Value Date    ALBUMIN 1.9 (L) 01/19/2024    ALBUMIN 1.9 (L) 01/19/2024    ALBUMIN 2.0 (L) 01/18/2024     No results found for: "PREALBUMIN"    Estimated Creatinine Clearance: 30.2 mL/min (A) (based on SCr of 1.7 mg/dL (H)).    Accu-Checks  Recent Labs     01/17/24  1923 01/17/24  2221 01/18/24  0349 01/18/24  0826 01/18/24  1241 01/18/24  1617 01/18/24  1907 01/18/24  2354 01/19/24  0319 01/19/24  0843   POCTGLUCOSE 100 122* 103 103 88 80 121* 157* 155* 155*       Current Medications and/or Treatments Impacting Glycemic Control  Immunotherapy:    Immunosuppressants       None          Steroids:   Hormones (From admission, onward)      None          Pressors:    Autonomic Drugs (From admission, onward)      Start     Stop Route Frequency Ordered    01/19/24 1132  midodrine tablet 10 mg         -- Oral As needed (PRN) 01/19/24 1032          Hyperglycemia/Diabetes Medications:   Antihyperglycemics (From admission, onward)      Start     Stop Route Frequency Ordered    01/19/24 0900  insulin detemir U-100 (Levemir) pen 6 Units         -- SubQ Daily 01/18/24 1520    01/11/24 0824  insulin aspart U-100 pen " 0-10 Units         -- SubQ Every 4 hours PRN 01/11/24 0724            ASSESSMENT and PLAN    Renal/  ESRD (end stage renal disease)    Titrate insulin slowly to avoid hypoglycemia as the risk of hypoglycemia increases with decreased creatinine clearance.      Oncology  * Malignant carcinoid tumor of bronchus  Managed per primary team        Endocrine  Type 2 diabetes mellitus with diabetic polyneuropathy, without long-term current use of insulin  BG goal: 140-180    - Levemir 6 units daily   - Novolog Mod dose correction with ISF 25 starting at 150 prn   - POCT Glucose before meals and at bedtime   - Hypoglycemia protocol in place      ** Please notify Endocrine for any change and/or advance in diet**  ** Please call Endocrine for any BG related issues **     Discharge Planning:   TBD. Please notify endocrinology prior to discharge.            Hue Lu PA-C  Endocrinology  [facility]

## 2024-01-20 NOTE — PROGRESS NOTES
[facility]  Critical Care - Surgery  Progress Note    Patient Name: Lily Green  MRN: [id number]  Admission Date: 1/2/2024  Hospital Length of Stay: 18 days  Code Status: Full Code  Attending Provider: Tan Thomson MD  Primary Care Provider: Pavel Calixto MD   Principal Problem: Malignant carcinoid tumor of bronchus    Subjective:     Interval History/Significant Events: Patient developed A-fib w/ RVR in the 120s yesterday around 1645. She was given Metoprolol 5mg IV push x3 which resolved her RVR. She converted to NSR overnight after receiving LR bolus and 1u pRBC for Hb 6.8.    Follow-up For: Procedure(s) (LRB):  INSERTION, CARDIAC PACEMAKER, LEADLESS (N/A)    Post-Operative Day: 4 Days Post-Op    Objective:     Vital Signs (Most Recent):  Temp: 97.6 °F (36.4 °C) (01/20/24 0730)  Pulse: 78 (01/20/24 1000)  Resp: 15 (01/20/24 1000)  BP: (!) 91/56 (01/20/24 1000)  SpO2: 100 % (01/20/24 1000) Vital Signs (24h Range):  Temp:  [97.2 °F (36.2 °C)-98.1 °F (36.7 °C)] 97.6 °F (36.4 °C)  Pulse:  [] 78  Resp:  [2-30] 15  SpO2:  [87 %-100 %] 100 %  BP: ()/(38-76) 91/56     Weight: 75.5 kg (166 lb 7.2 oz)  Body mass index is 27.7 kg/m².      Intake/Output Summary (Last 24 hours) at 1/20/2024 1154  Last data filed at 1/20/2024 0900  Gross per 24 hour   Intake 2715.03 ml   Output 870 ml   Net 1845.03 ml          Physical Exam  Vitals and nursing note reviewed.   Constitutional:       General: She is not in acute distress.     Appearance: Normal appearance. She is normal weight. She is ill-appearing. She is not toxic-appearing.   HENT:      Head: Normocephalic and atraumatic.      Right Ear: External ear normal.      Left Ear: External ear normal.      Nose: Nose normal.      Comments: Nasal cannula in place  Eyes:      General: No scleral icterus.        Right eye: No discharge.         Left eye: No discharge.      Extraocular Movements: Extraocular movements intact.       Conjunctiva/sclera: Conjunctivae normal.   Cardiovascular:      Rate and Rhythm: Normal rate and regular rhythm.   Pulmonary:      Effort: Pulmonary effort is normal. No respiratory distress.      Comments: L chest tube in place  R chest tube in place  Abdominal:      General: Abdomen is flat. There is no distension.      Palpations: Abdomen is soft. There is no mass.      Tenderness: There is no abdominal tenderness. There is no guarding.      Hernia: No hernia is present.   Genitourinary:     Comments: Groin incisions covered with gauze and Tegaderm bilaterally, no breakthrough bleeding or hematomas noted  Musculoskeletal:         General: Swelling (BUE edema) present. Normal range of motion.      Cervical back: Normal range of motion and neck supple.   Skin:     General: Skin is warm and dry.   Neurological:      General: No focal deficit present.      Mental Status: She is alert and oriented to person, place, and time. Mental status is at baseline.   Psychiatric:         Mood and Affect: Mood normal.         Behavior: Behavior normal.         Thought Content: Thought content normal.            Vents:  Oxygen Concentration (%): 24 (01/15/24 0200)    Lines/Drains/Airways       Central Venous Catheter Line  Duration                  Hemodialysis Catheter 01/25/23 1501 right internal jugular 359 days              Drain  Duration                  Chest Tube 01/17/24 1504 Right Pleural 10 Fr. 2 days         Chest Tube 01/18/24 1537 Left Pleural 10 Fr. 1 day              Peripheral Intravenous Line  Duration                  Hemodialysis AV Fistula 09/01/23 0800 Left forearm 141 days         Peripheral IV - Single Lumen 01/15/24 1230 20 G;1 3/4 in Anterior;Proximal;Right Forearm 4 days         Peripheral IV - Single Lumen 01/20/24 0128 20 G Right Antecubital <1 day                    Significant Labs:    CBC/Anemia Profile:  Recent Labs   Lab 01/19/24  0318 01/20/24  0101   WBC 23.34* 16.54*   HGB 7.2* 6.8*   HCT 22.5*  21.8*    178   MCV 92 94   RDW 21.7* 22.0*        Chemistries:  Recent Labs   Lab 01/19/24  0318 01/19/24  1513 01/20/24  0104     133* 137 134*   K 4.8  4.7 3.4* 4.0     105 107 106   CO2 22*  22* 25 23   BUN 19  18 16 17   CREATININE 1.7*  1.6* 1.2 1.4   CALCIUM 8.0*  8.1* 8.3* 7.7*   ALBUMIN 1.9*  1.9* 1.9* 1.7*   PROT 4.7*  --  4.5*   BILITOT 0.7  --  0.5   ALKPHOS 133  --  104   ALT 12  --  11   AST 19  --  17   MG 2.3 2.1  2.1 2.0   PHOS 2.7  2.6* 1.4* 1.9*       All pertinent labs within the past 24 hours have been reviewed.    Significant Imaging:  I have reviewed all pertinent imaging results/findings within the past 24 hours.  Assessment/Plan:     Cardiac/Vascular  Atrial fibrillation  72 yo female with 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 Stepped up to the unit for persistent afib rvr. Had SBO (now resolved). 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  Pacemaker placed on 01/16. R Chest tube placed w IR on 1/17. L sided chest tube placed w IR on 1/18. Cumulative chest tube output of 3.8L. CXR stable. Pleural cultures NGTD.      Neuro/Psych:   -- Sedation: none  -- Pain: Lidocaine patch   -- has some baseline anxiety, no longer on meds     Cards:   -- BP goals SBP >90, MAPs >55   - Midodrine 5mg BID  -- Afib RVR, 1/19/24 after HD. Given Metoprolol 5mg IV x3    - Now back in NSR   - prn iv metoprolol for sustained RVR > 130's   - If IV Metop fails, start PO metoprolol if HDS stable. If hypotensive start Amio   - AC discontinued in setting of getting PPM  -- Asystolic arrest, now s/p PPM 1/16 rate set to 60 bmp   - 1/12/24 patient asystole with 1 round of compressions and achievement of ROSC. Multiple bradycardic events prior to which had self resolved. STAT TVP placement unsuccessful 2/2 central venous stenosis. 2nd episode of asystole resolved after  minimal compressions. 3rd episode of asystole resolved with singular compression.   - S/p TVP via groin in Cath lab 1/12, removed 1/16   - PPM 1/16 @ 60, heparin gtt hold x 5 days (okay to resume AC on 1/21)  - Symptomatic bradycardic events continue to occur, requiring pacing but remains HDS   - high digoxin level now s/p digibind 1/12 and converted to NSR after administration  - dig level now WNL x 2     Pulm:   -- Goal O2 sat > 90%   - satting 100% on RA  -- Surgical chest tube d/c'd 1/8/24  -- CXR with pleural effusions and RLL opacification  -- CT chest (1/13): R worse than L pleural effusion, pericardial effusion   -- IR consulted to place bilateral chest tubes for pleural effusions, 3.8L output total   -- R CT placed 1/17   -- L CT placed 1/18  -- continue incentive spirometry        Renal:  -- history of ESRD, dialysis dependent   - CRRT per nephrology, ideally would work to transition to intermittent HD  -- trend BUN/Cr  -- holding sevelamer in setting of poor PO intake and daily low phos  -- patient is oliguric  -- permacath in place last exchanged 10/2023  -- fistula unable to be used  -- oxybutynin in effort to reduce possible bladder spasm triggering eulogio episodes    Recent Labs   Lab 01/19/24  0318 01/19/24  1513 01/20/24  0104   BUN 19  18 16 17   CREATININE 1.7*  1.6* 1.2 1.4           FEN / GI:   -- CT on 1/8/24 demonstrating SBO, now resolved  -- US on 1/8/24 showing distended gallbladder without signs of acute cholecystitis   - NGT removed.  - Equivocal GGC 1/10  - DC Reglan 1/17  -- PPI  -- Replace lytes as needed  -- Nutrition: diabetic/renal/gluten free soft and bite sized diet with novasource daily  - Aspiration precautions are still of utmost importance  - Appreciate nursing assistance with pt eating  - Dietician consulted for complex dietary restriction  - home appetite stimulant dronabinol 5 mg BID      ID:   -- Tm: afebrile; WBC stable  -- C/f HAP with RLL consolidation  -- MRSA neg, dc  vanc  -- zosyn broadened to meropenem giving pleural effusions  -- blood cultures with 1 bottle growing staph epi, likely a contaminant  -- follow up cultures from pleural fluid, appreciate ID recommendations and assistance    Recent Labs   Lab 01/18/24  0348 01/19/24  0318 01/20/24  0101   WBC 21.82* 23.34* 16.54*           Heme/Onc:  -- Daily CBC  -- heparin gtt for A-fib, hold 5 days after PPM 1/16 (can restart 1/21)  -- Received blood on 1/20/24 for Hb 6.8    Recent Labs   Lab 01/17/24  0316 01/18/24  0348 01/19/24  0318 01/20/24  0101   HGB 7.6* 7.2* 7.2* 6.8*    169 194 178   APTT 23.9 22.2 24.8  --            Endo:   -- history of diabetes  -- Gluc goal 140-180  -- SSI      PPx:   Feeding: diabetic/renal/gluten diet and novasource (restart after procedure)  Analgesia/Sedation:  Pain is controlled, no sedation  Thromboembolic prevention: heparin gtt held  HOB >30: yes  Stress Ulcer ppx: pantoprazole  Glucose control: Critical care goal 140-180 g/dl, SSI  Bowel reg:  having BM  Invasive Lines/Drains/Airway: Permacath, PIV x2  Deescalation: none        Dispo/Code Status/Palliative:   -- SICU/ Full Code   -- Patient needs to get out of bed and move, appreciate nursing, and PT and OT assistance/help in this matter          Critical care was time spent personally by me on the following activities: development of treatment plan with patient or surrogate and bedside caregivers, discussions with consultants, evaluation of patient's response to treatment, examination of patient, ordering and performing treatments and interventions, ordering and review of laboratory studies, ordering and review of radiographic studies, pulse oximetry, re-evaluation of patient's condition.  This critical care time did not overlap with that of any other provider or involve time for any procedures.     Prabhu Esqueda MD  Critical Care - Surgery  [facility]

## 2024-01-20 NOTE — NURSING
PT's MAPs 52-55. Pt asymptomatic. Notified Dr. Rincon. Orders to administer  cc bolus over 1 hr. Will continue to monitor.    2315: 1st bolus administered. Pt's MAPs 50-52. Pt remains asymptomatic. Notified Dr. Rincon. Orders to administer another LR 500cc bolus over 1 hr. Will continue to monitor.    0015: 2nd bolus administered. Pt's MAPs 50-52. Pt remains asymptomatic. Notified Dr. Rincon. Orders to administer PO midodrine 5 mg. Will continue to monitor.    0100: Pt remains hypotensive, MAPs 48-50. Remains asymptomatic. Notified Dr. Rincon. Orders to administer another LR 500cc bolus over 1 hr and another dose of midodrine 5 mg. Will continue to monitor.

## 2024-01-20 NOTE — ASSESSMENT & PLAN NOTE
"I have reviewed hospital notes from  S[facility] service and other specialty providers. I have also reviewed CBC, CMP/BMP,  cultures and imaging with my interpretation as documented.     Admitted for carcinoid tumor lymphadenectomy,  robotic-assisted surgery converted to right thoracotomy with therapeutic wedge resection and open mediastinal lymph node dissection on 1/2. Hospital course complicated by Afib with RVR, ileus managed with NGT which has since been removed, and tachy-eulogio syndrome requiring temporary pacemaker. Now with multi loculated pleural effusion presumptively parapneumonic in etiology. However, diagnostic work up to assess whether effusion is infectious or non infectious not yet performed.     Re-consulted on 1/16 for "OK" to proceed with pacemaker placement. S/P PPM on 1/16. Afebrile and with down trending leukocytosis. Gram stain from chest tube on the right without organisms. S/P left sided chest tube on 1/18.  Cultures NGTD. Pathology from right sided chest tube without evidence of malignancy.  Continue meropenem until 1/20/24.  Start ceftriaxone 2 gm IV daily plus PO metronidazole 500 mg BID on 1/21/24.  Treat for at least 4 weeks (tentative end date: 2/15/24) from chest tube placement with repeat CXR prior to antibiotic completion to assess if therapy needs to be extended an additional 2 weeks.   Reconsult Infectious Diseases once in 3-4 weeks with repeat CXR.  Discussed management plan with the staff and/or members from SICU service.  "

## 2024-01-20 NOTE — SUBJECTIVE & OBJECTIVE
"Interval History: "Not good". Right sided pleural effusion of unknown etiology. On broad spectrum antibiotics. Underwent PPM o 1/16. Bradycardia episodes overnight. RIght sided chest tube placement on 1/17. S/P left sided chest tube on 1/18. Cultures NGTD.    Review of Systems   Constitutional:  Positive for fatigue. Negative for chills, fever and unexpected weight change.   HENT:  Negative for ear pain, facial swelling, hearing loss, mouth sores, nosebleeds, rhinorrhea, sinus pressure, sore throat, tinnitus, trouble swallowing and voice change.    Eyes:  Negative for photophobia, pain, redness and visual disturbance.   Respiratory:  Positive for cough and shortness of breath. Negative for choking, chest tightness and wheezing.    Cardiovascular:  Negative for chest pain, palpitations and leg swelling.   Gastrointestinal:  Positive for nausea. Negative for abdominal pain, blood in stool, constipation, diarrhea and vomiting.   Endocrine: Negative for cold intolerance, heat intolerance, polydipsia, polyphagia and polyuria.   Genitourinary:  Negative for decreased urine volume, dysuria, flank pain, frequency, hematuria, menstrual problem, urgency, vaginal bleeding, vaginal discharge and vaginal pain.   Musculoskeletal:  Negative for arthralgias, back pain, joint swelling, myalgias and neck pain.   Skin:  Negative for rash.   Allergic/Immunologic: Negative for environmental allergies, food allergies and immunocompromised state.   Neurological:  Negative for dizziness, seizures, syncope, weakness, light-headedness, numbness and headaches.   Hematological:  Negative for adenopathy. Does not bruise/bleed easily.   Psychiatric/Behavioral:  Negative for confusion, hallucinations, self-injury, sleep disturbance and suicidal ideas. The patient is not nervous/anxious.      Objective:     Vital Signs (Most Recent):  Temp: 97.6 °F (36.4 °C) (01/20/24 0730)  Pulse: 78 (01/20/24 1000)  Resp: 15 (01/20/24 1000)  BP: (!) 91/56 " (01/20/24 1000)  SpO2: 100 % (01/20/24 1000) Vital Signs (24h Range):  Temp:  [97.2 °F (36.2 °C)-98.1 °F (36.7 °C)] 97.6 °F (36.4 °C)  Pulse:  [] 78  Resp:  [2-30] 15  SpO2:  [87 %-100 %] 100 %  BP: ()/(38-76) 91/56     Weight: 75.5 kg (166 lb 7.2 oz)  Body mass index is 27.7 kg/m².    Estimated Creatinine Clearance: 36.4 mL/min (based on SCr of 1.4 mg/dL).     Physical Exam  Vitals and nursing note reviewed.   Constitutional:       Appearance: She is well-developed. She is ill-appearing.   HENT:      Head: Normocephalic and atraumatic.      Right Ear: External ear normal.      Left Ear: External ear normal.   Eyes:      General: No scleral icterus.        Right eye: No discharge.         Left eye: No discharge.      Conjunctiva/sclera: Conjunctivae normal.   Cardiovascular:      Rate and Rhythm: Normal rate and regular rhythm.      Heart sounds: Normal heart sounds. No murmur heard.     No friction rub. No gallop.   Pulmonary:      Effort: Pulmonary effort is normal. No respiratory distress.      Breath sounds: No stridor. Examination of the right-middle field reveals rales. Examination of the right-lower field reveals rales. Examination of the left-lower field reveals rales. Rales present. No decreased breath sounds or wheezing.   Abdominal:      General: Bowel sounds are normal.   Musculoskeletal:         General: No tenderness.      Right lower leg: Edema present.      Left lower leg: Edema present.   Skin:     General: Skin is warm and dry.   Neurological:      Mental Status: She is alert and oriented to person, place, and time.          Significant Labs: BMP:   Recent Labs   Lab 01/20/24  0104   *   *   K 4.0      CO2 23   BUN 17   CREATININE 1.4   CALCIUM 7.7*   MG 2.0       CBC:   Recent Labs   Lab 01/19/24  0318 01/20/24  0101 01/20/24  1152   WBC 23.34* 16.54* 18.74*   HGB 7.2* 6.8* 8.6*   HCT 22.5* 21.8* 26.8*    178 205       Microbiology Results (last 7 days)        Procedure Component Value Units Date/Time    AFB culture [[id number]] Collected: 01/18/24 1538    Order Status: Completed Specimen: Body Fluid from Pleural Fluid Updated: 01/19/24 2128     AFB Culture & Smear Culture in progress     AFB CULTURE STAIN No acid fast bacilli seen.    Narrative:      IR procedure 1/18 L lung    Culture, body fluid - Bactec [[id number]] Collected: 01/18/24 1537    Order Status: Completed Specimen: Body Fluid from Thoracentesis Fluid Updated: 01/19/24 2012     Body Fluid Culture, Sterile No Growth to date      No Growth to date    Narrative:      IR procedure 1/18 L lung    Culture, body fluid - Bactec [[id number]] Collected: 01/17/24 1555    Order Status: Completed Specimen: Body Fluid from Pleural Fluid Updated: 01/19/24 2012     Body Fluid Culture, Sterile No Growth to date      No Growth to date      No Growth to date    AFB culture [[id number]] Collected: 01/17/24 1555    Order Status: Completed Specimen: Body Fluid from Pleural Fluid Updated: 01/18/24 2128     AFB Culture & Smear Culture in progress     AFB CULTURE STAIN No acid fast bacilli seen.    AFB stain [[id number]] Collected: 01/18/24 1538    Order Status: Completed Specimen: Body Fluid from Pleural Fluid Updated: 01/18/24 2048     Direct Acid Fast No acid fast bacilli seen.    Narrative:      IR procedure 1/18 L lung    Gram stain [[id number]] Collected: 01/18/24 1538    Order Status: Completed Specimen: Body Fluid from Pleural Fluid Updated: 01/18/24 2034     Gram Stain Result Rare WBC's      No organisms seen    Narrative:      IR procedure 1/18 L lung    KOH prep [[id number]] Collected: 01/18/24 1538    Order Status: Completed Specimen: Body Fluid from Pleural Fluid Updated: 01/18/24 1932     KOH Prep No yeast or fungal elements seen    Narrative:      IR procedure 1/18 L lung    Fungus culture [[id number]] Collected: 01/18/24 1538    Order Status: Sent Specimen: Body Fluid from Pleural Fluid Updated: 01/18/24 1652    AFB  stain [[id number]] Collected: 01/17/24 1555    Order Status: Completed Specimen: Body Fluid from Pleural Fluid Updated: 01/17/24 2012     Direct Acid Fast No acid fast bacilli seen.    Gram stain [[id number]] Collected: 01/17/24 1555    Order Status: Completed Specimen: Body Fluid from Pleural Fluid Updated: 01/17/24 1848     Gram Stain Result No WBC's      No organisms seen    JADEN prep [[id number]] Collected: 01/17/24 1555    Order Status: Completed Specimen: Body Fluid from Pleural Fluid Updated: 01/17/24 1836     KOH Prep No yeast or fungal elements seen    Fungus culture [[id number]] Collected: 01/17/24 1555    Order Status: Sent Specimen: Body Fluid from Pleural Fluid Updated: 01/17/24 1639    Blood culture [[id number]] Collected: 01/12/24 0411    Order Status: Completed Specimen: Blood from Peripheral, Foot, Left Updated: 01/17/24 0812     Blood Culture, Routine No growth after 5 days.    Culture, Respiratory with Gram Stain [[id number]]  (Abnormal)  (Susceptibility) Collected: 01/11/24 2030    Order Status: Completed Specimen: Sputum, Induced Updated: 01/15/24 0925     Respiratory Culture No S aureus isolated.      KLEBSIELLA PNEUMONIAE  Many        ESCHERICHIA COLI  Many  Normal respiratory ramu also present       Gram Stain (Respiratory) <10 epithelial cells per low power field.     Gram Stain (Respiratory) Moderate WBC's     Gram Stain (Respiratory) Many Gram negative rods     Gram Stain (Respiratory) Rare Gram positive cocci    Blood culture [[id number]]  (Abnormal) Collected: 01/11/24 1601    Order Status: Completed Specimen: Blood from Peripheral, Antecubital, Right Updated: 01/14/24 0812     Blood Culture, Routine Gram stain shahriar bottle: Gram positive cocci in clusters resembling Staph      Results called to and read back by: Gena UNGER RN 01/12/2024  18:43      COAGULASE-NEGATIVE STAPHYLOCOCCUS SPECIES  Organism is a probable contaminant              Significant Imaging: I have reviewed all pertinent  imaging results/findings within the past 24 hours.

## 2024-01-20 NOTE — SUBJECTIVE & OBJECTIVE
Interval History: NAEON. Had episode of rate controlled AF overnight that converted. NO further tele events. Chest tube in place.     Review of Systems   Constitutional: Negative for diaphoresis and fever.   Cardiovascular:  Negative for chest pain, dyspnea on exertion, leg swelling, near-syncope, orthopnea, palpitations, paroxysmal nocturnal dyspnea and syncope.   Respiratory:  Negative for cough and shortness of breath.    Gastrointestinal:  Negative for abdominal pain, diarrhea, nausea and vomiting.   Neurological:  Negative for light-headedness.   Psychiatric/Behavioral:  Negative for altered mental status and substance abuse.      Objective:     Vital Signs (Most Recent):  Temp: 97.6 °F (36.4 °C) (01/20/24 0730)  Pulse: 78 (01/20/24 1000)  Resp: 15 (01/20/24 1000)  BP: (!) 91/56 (01/20/24 1000)  SpO2: 100 % (01/20/24 1000) Vital Signs (24h Range):  Temp:  [97.2 °F (36.2 °C)-98.1 °F (36.7 °C)] 97.6 °F (36.4 °C)  Pulse:  [] 78  Resp:  [2-30] 15  SpO2:  [87 %-100 %] 100 %  BP: ()/(38-76) 91/56     Weight: 75.5 kg (166 lb 7.2 oz)  Body mass index is 27.7 kg/m².     SpO2: 100 %        Physical Exam  Constitutional:       Appearance: Normal appearance.   Cardiovascular:      Rate and Rhythm: Normal rate and regular rhythm.      Pulses: Normal pulses.      Heart sounds: Normal heart sounds.   Pulmonary:      Effort: Pulmonary effort is normal.      Breath sounds: Normal breath sounds. No wheezing or rales.      Comments: Chest tubes in place  Abdominal:      General: Abdomen is flat. There is no distension.      Palpations: Abdomen is soft.      Tenderness: There is no abdominal tenderness.   Musculoskeletal:      Right lower leg: No edema.      Left lower leg: No edema.   Skin:     General: Skin is warm and dry.   Neurological:      Mental Status: She is alert and oriented to person, place, and time. Mental status is at baseline.   Psychiatric:         Mood and Affect: Mood normal.         Behavior:  Behavior normal.            Significant Labs: BMP:   Recent Labs   Lab 01/19/24 0318 01/19/24  1513 01/20/24  0104   *  138* 147* 152*     133* 137 134*   K 4.8  4.7 3.4* 4.0     105 107 106   CO2 22*  22* 25 23   BUN 19  18 16 17   CREATININE 1.7*  1.6* 1.2 1.4   CALCIUM 8.0*  8.1* 8.3* 7.7*   MG 2.3 2.1  2.1 2.0   , CMP:   Recent Labs   Lab 01/19/24 0318 01/19/24  1513 01/20/24  0104     133* 137 134*   K 4.8  4.7 3.4* 4.0     105 107 106   CO2 22*  22* 25 23   *  138* 147* 152*   BUN 19  18 16 17   CREATININE 1.7*  1.6* 1.2 1.4   CALCIUM 8.0*  8.1* 8.3* 7.7*   PROT 4.7*  --  4.5*   ALBUMIN 1.9*  1.9* 1.9* 1.7*   BILITOT 0.7  --  0.5   ALKPHOS 133  --  104   AST 19  --  17   ALT 12  --  11   ANIONGAP 7*  6* 5* 5*   , and CBC:   Recent Labs   Lab 01/19/24 0318 01/20/24  0101   WBC 23.34* 16.54*   HGB 7.2* 6.8*   HCT 22.5* 21.8*    178

## 2024-01-20 NOTE — ASSESSMENT & PLAN NOTE
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 Stepped up to the unit for persistent afib rvr. Had SBO (now resolved). 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  Pacemaker placed on 01/16. R Chest tube placed w IR on 1/17. L sided chest tube placed w IR on 1/18. Cumulative chest tube output of 3.8L. CXR stable. Pleural cultures NGTD.      Neuro/Psych:   -- Sedation: none  -- Pain: Lidocaine patch   -- has some baseline anxiety, no longer on meds     Cards:   -- BP goals SBP >90, MAPs >55   - Midodrine 5mg BID  -- Afib RVR, 1/19/24 after HD. Given Metoprolol 5mg IV x3    - Now back in NSR   - prn iv metoprolol for sustained RVR > 130's   - If IV Metop fails, start PO metoprolol if HDS stable. If hypotensive start Amio   - AC discontinued in setting of getting PPM  -- Asystolic arrest, now s/p PPM 1/16 rate set to 60 bmp   - 1/12/24 patient asystole with 1 round of compressions and achievement of ROSC. Multiple bradycardic events prior to which had self resolved. STAT TVP placement unsuccessful 2/2 central venous stenosis. 2nd episode of asystole resolved after minimal compressions. 3rd episode of asystole resolved with singular compression.   - S/p TVP via groin in Cath lab 1/12, removed 1/16   - PPM 1/16 @ 60, heparin gtt hold x 5 days (okay to resume AC on 1/21)  - Symptomatic bradycardic events continue to occur, requiring pacing but remains HDS   - high digoxin level now s/p digibind 1/12 and converted to NSR after administration  - dig level now WNL x 2     Pulm:   -- Goal O2 sat > 90%   - satting 100% on RA  -- Surgical chest tube d/c'd 1/8/24  -- CXR with pleural effusions and RLL opacification  -- CT chest (1/13): R worse than L pleural effusion, pericardial effusion   -- IR consulted to place bilateral chest tubes for pleural effusions, 3.8L output total   -- R CT placed  1/17   -- L CT placed 1/18  -- continue incentive spirometry        Renal:  -- history of ESRD, dialysis dependent   - CRRT per nephrology, ideally would work to transition to intermittent HD  -- trend BUN/Cr  -- holding sevelamer in setting of poor PO intake and daily low phos  -- patient is oliguric  -- permacath in place last exchanged 10/2023  -- fistula unable to be used  -- oxybutynin in effort to reduce possible bladder spasm triggering eulogio episodes    Recent Labs   Lab 01/19/24  0318 01/19/24  1513 01/20/24  0104   BUN 19 18 16 17   CREATININE 1.7*  1.6* 1.2 1.4           FEN / GI:   -- CT on 1/8/24 demonstrating SBO, now resolved  -- US on 1/8/24 showing distended gallbladder without signs of acute cholecystitis   - NGT removed.  - Equivocal GGC 1/10  - DC Reglan 1/17  -- PPI  -- Replace lytes as needed  -- Nutrition: diabetic/renal/gluten free soft and bite sized diet with novasource daily  - Aspiration precautions are still of utmost importance  - Appreciate nursing assistance with pt eating  - Dietician consulted for complex dietary restriction  - home appetite stimulant dronabinol 5 mg BID      ID:   -- Tm: afebrile; WBC stable  -- C/f HAP with RLL consolidation  -- MRSA neg, dc vanc  -- zosyn broadened to meropenem giving pleural effusions  -- blood cultures with 1 bottle growing staph epi, likely a contaminant  -- follow up cultures from pleural fluid, appreciate ID recommendations and assistance    Recent Labs   Lab 01/18/24  0348 01/19/24  0318 01/20/24  0101   WBC 21.82* 23.34* 16.54*           Heme/Onc:  -- Daily CBC  -- heparin gtt for A-fib, hold 5 days after PPM 1/16 (can restart 1/21)  -- Received blood on 1/20/24 for Hb 6.8    Recent Labs   Lab 01/17/24  0316 01/18/24  0348 01/19/24  0318 01/20/24  0101   HGB 7.6* 7.2* 7.2* 6.8*    169 194 178   APTT 23.9 22.2 24.8  --            Endo:   -- history of diabetes  -- Gluc goal 140-180  -- SSI      PPx:   Feeding:  diabetic/renal/gluten diet and novasource (restart after procedure)  Analgesia/Sedation:  Pain is controlled, no sedation  Thromboembolic prevention: heparin gtt held  HOB >30: yes  Stress Ulcer ppx: pantoprazole  Glucose control: Critical care goal 140-180 g/dl, SSI  Bowel reg:  having BM  Invasive Lines/Drains/Airway: Permacath, PIV x2  Deescalation: none        Dispo/Code Status/Palliative:   -- SICU/ Full Code   -- Patient needs to get out of bed and move, appreciate nursing, and PT and OT assistance/help in this matter

## 2024-01-20 NOTE — ASSESSMENT & PLAN NOTE
"I have reviewed hospital notes from  SIC service and other specialty providers. I have also reviewed CBC, CMP/BMP,  cultures and imaging with my interpretation as documented.     Admitted for carcinoid tumor lymphadenectomy,  robotic-assisted surgery converted to right thoracotomy with therapeutic wedge resection and open mediastinal lymph node dissection on 1/2. Hospital course complicated by Afib with RVR, ileus managed with NGT which has since been removed, and tachy-eulogio syndrome requiring temporary pacemaker. Now with multi loculated pleural effusion presumptively parapneumonic in etiology. However, diagnostic work up to assess whether effusion is infectious or non infectious not yet performed.     Re-consulted on 1/16 for "OK" to proceed with pacemaker placement. S/P PPM on 1/16. Afebrile and with down trending leukocytosis. Gram stain from chest tube on the right without organisms. S/P left sided chest tube on 1/18.  Cultures NGTD. Pathology from right sided chest tube without evidence of malignancy.  Continue meropenem until 1/20/24.  Start ceftriaxone 2 gm IV daily plus PO metronidazole 500 mg BID on 1/21/24.  Will follow up cultures and pahtology.  Discussed with patient and family at bedside.   Discussed management plan with the staff and/or members from SICU service.  "

## 2024-01-20 NOTE — PLAN OF CARE
SICU PLAN OF CARE NOTE    Dx: Malignant carcinoid tumor of bronchus    Goals of Care: MAP >55    Vital Signs (last 12 hours):   Temp:  [97.5 °F (36.4 °C)-98.1 °F (36.7 °C)]   Pulse:  []   Resp:  [13-30]   BP: ()/(38-58)   SpO2:  [87 %-100 %]      Neuro: AAO x4, Follows Commands, and Moves All Extremities    Cardiac: Afib 70-120s    Respiratory: Room Air    Gtts: None    Urine Output: Anuric    Drains: Left Pleural  cc/shift; Right Pleural CT 85 cc/shift    Diet: Diabetic/Renal Diet     Labs/Accuchecks: Daily AM labs; AC/HS Accuchecks    Skin:  All skin remains free from injury.  Patient turned Q2, mattress inflated, and bed working correctly.    Shift Events:  LR 500c bolus x3 and PO midodrine 5 mg x2 for hypotension per Dr. Rincon's orders. 1 unit of PRBCs transfused for low H/H per Dr. Rincon's orders. Phosphorus replaced per orders. See flowsheet for further assessment/details.  Family updated on current condition/plan of care, questions answered, and emotional support provided.  MD updated on current condition, vitals, labs, and gtts.  No new orders received, will continue to monitor.

## 2024-01-21 LAB
ALBUMIN SERPL BCP-MCNC: 1.9 G/DL (ref 3.5–5.2)
ALP SERPL-CCNC: 97 U/L (ref 55–135)
ALT SERPL W/O P-5'-P-CCNC: 9 U/L (ref 10–44)
ANION GAP SERPL CALC-SCNC: 6 MMOL/L (ref 8–16)
AST SERPL-CCNC: 15 U/L (ref 10–40)
BASOPHILS # BLD AUTO: 0.03 K/UL (ref 0–0.2)
BASOPHILS NFR BLD: 0.2 % (ref 0–1.9)
BILIRUB SERPL-MCNC: 0.6 MG/DL (ref 0.1–1)
BUN SERPL-MCNC: 34 MG/DL (ref 8–23)
CALCIUM SERPL-MCNC: 8.2 MG/DL (ref 8.7–10.5)
CHLORIDE SERPL-SCNC: 107 MMOL/L (ref 95–110)
CO2 SERPL-SCNC: 24 MMOL/L (ref 23–29)
CREAT SERPL-MCNC: 2.5 MG/DL (ref 0.5–1.4)
DIFFERENTIAL METHOD BLD: ABNORMAL
EOSINOPHIL # BLD AUTO: 0.1 K/UL (ref 0–0.5)
EOSINOPHIL NFR BLD: 0.5 % (ref 0–8)
ERYTHROCYTE [DISTWIDTH] IN BLOOD BY AUTOMATED COUNT: 22.1 % (ref 11.5–14.5)
EST. GFR  (NO RACE VARIABLE): 19.8 ML/MIN/1.73 M^2
GLUCOSE SERPL-MCNC: 125 MG/DL (ref 70–110)
HCT VFR BLD AUTO: 27 % (ref 37–48.5)
HGB BLD-MCNC: 8.8 G/DL (ref 12–16)
IMM GRANULOCYTES # BLD AUTO: 0.4 K/UL (ref 0–0.04)
IMM GRANULOCYTES NFR BLD AUTO: 2.2 % (ref 0–0.5)
LYMPHOCYTES # BLD AUTO: 1.1 K/UL (ref 1–4.8)
LYMPHOCYTES NFR BLD: 6.1 % (ref 18–48)
MAGNESIUM SERPL-MCNC: 2.1 MG/DL (ref 1.6–2.6)
MCH RBC QN AUTO: 29.7 PG (ref 27–31)
MCHC RBC AUTO-ENTMCNC: 32.6 G/DL (ref 32–36)
MCV RBC AUTO: 91 FL (ref 82–98)
MONOCYTES # BLD AUTO: 1.4 K/UL (ref 0.3–1)
MONOCYTES NFR BLD: 8 % (ref 4–15)
NEUTROPHILS # BLD AUTO: 14.8 K/UL (ref 1.8–7.7)
NEUTROPHILS NFR BLD: 83 % (ref 38–73)
NRBC BLD-RTO: 0 /100 WBC
PHOSPHATE SERPL-MCNC: 2.5 MG/DL (ref 2.7–4.5)
PLATELET # BLD AUTO: 181 K/UL (ref 150–450)
PMV BLD AUTO: 11.9 FL (ref 9.2–12.9)
POCT GLUCOSE: 138 MG/DL (ref 70–110)
POCT GLUCOSE: 169 MG/DL (ref 70–110)
POCT GLUCOSE: 183 MG/DL (ref 70–110)
POTASSIUM SERPL-SCNC: 4.5 MMOL/L (ref 3.5–5.1)
PROT SERPL-MCNC: 4.9 G/DL (ref 6–8.4)
RBC # BLD AUTO: 2.96 M/UL (ref 4–5.4)
SODIUM SERPL-SCNC: 137 MMOL/L (ref 136–145)
WBC # BLD AUTO: 17.79 K/UL (ref 3.9–12.7)

## 2024-01-21 PROCEDURE — 25000003 PHARM REV CODE 250: Performed by: STUDENT IN AN ORGANIZED HEALTH CARE EDUCATION/TRAINING PROGRAM

## 2024-01-21 PROCEDURE — 25000003 PHARM REV CODE 250

## 2024-01-21 PROCEDURE — 85025 COMPLETE CBC W/AUTO DIFF WBC: CPT | Performed by: STUDENT IN AN ORGANIZED HEALTH CARE EDUCATION/TRAINING PROGRAM

## 2024-01-21 PROCEDURE — 99232 SBSQ HOSP IP/OBS MODERATE 35: CPT | Mod: GC,,, | Performed by: ANESTHESIOLOGY

## 2024-01-21 PROCEDURE — 11000001 HC ACUTE MED/SURG PRIVATE ROOM

## 2024-01-21 PROCEDURE — 63600175 PHARM REV CODE 636 W HCPCS

## 2024-01-21 PROCEDURE — 83735 ASSAY OF MAGNESIUM: CPT

## 2024-01-21 PROCEDURE — 25000003 PHARM REV CODE 250: Performed by: SURGERY

## 2024-01-21 PROCEDURE — 51798 US URINE CAPACITY MEASURE: CPT

## 2024-01-21 PROCEDURE — 80053 COMPREHEN METABOLIC PANEL: CPT | Performed by: STUDENT IN AN ORGANIZED HEALTH CARE EDUCATION/TRAINING PROGRAM

## 2024-01-21 PROCEDURE — 84100 ASSAY OF PHOSPHORUS: CPT

## 2024-01-21 PROCEDURE — 99233 SBSQ HOSP IP/OBS HIGH 50: CPT | Mod: 24,,, | Performed by: STUDENT IN AN ORGANIZED HEALTH CARE EDUCATION/TRAINING PROGRAM

## 2024-01-21 PROCEDURE — 63600175 PHARM REV CODE 636 W HCPCS: Performed by: STUDENT IN AN ORGANIZED HEALTH CARE EDUCATION/TRAINING PROGRAM

## 2024-01-21 RX ORDER — METRONIDAZOLE 500 MG/1
500 TABLET ORAL EVERY 12 HOURS
Status: DISCONTINUED | OUTPATIENT
Start: 2024-01-21 | End: 2024-02-06 | Stop reason: HOSPADM

## 2024-01-21 RX ORDER — SODIUM,POTASSIUM PHOSPHATES 280-250MG
1 POWDER IN PACKET (EA) ORAL ONCE
Status: COMPLETED | OUTPATIENT
Start: 2024-01-21 | End: 2024-01-21

## 2024-01-21 RX ADMIN — MIDODRINE HYDROCHLORIDE 5 MG: 5 TABLET ORAL at 04:01

## 2024-01-21 RX ADMIN — SODIUM CHLORIDE: 9 INJECTION, SOLUTION INTRAVENOUS at 11:01

## 2024-01-21 RX ADMIN — LIDOCAINE 5% 1 PATCH: 700 PATCH TOPICAL at 11:01

## 2024-01-21 RX ADMIN — PANTOPRAZOLE SODIUM 40 MG: 40 TABLET, DELAYED RELEASE ORAL at 08:01

## 2024-01-21 RX ADMIN — INSULIN ASPART 2 UNITS: 100 INJECTION, SOLUTION INTRAVENOUS; SUBCUTANEOUS at 08:01

## 2024-01-21 RX ADMIN — OXYBUTYNIN CHLORIDE 5 MG: 5 TABLET ORAL at 08:01

## 2024-01-21 RX ADMIN — SENNOSIDES AND DOCUSATE SODIUM 1 TABLET: 8.6; 5 TABLET ORAL at 08:01

## 2024-01-21 RX ADMIN — OXYBUTYNIN CHLORIDE 5 MG: 5 TABLET ORAL at 03:01

## 2024-01-21 RX ADMIN — INSULIN ASPART 2 UNITS: 100 INJECTION, SOLUTION INTRAVENOUS; SUBCUTANEOUS at 12:01

## 2024-01-21 RX ADMIN — DRONABINOL 5 MG: 2.5 CAPSULE ORAL at 08:01

## 2024-01-21 RX ADMIN — CEFTRIAXONE 2 G: 2 INJECTION, POWDER, FOR SOLUTION INTRAMUSCULAR; INTRAVENOUS at 09:01

## 2024-01-21 RX ADMIN — MIDODRINE HYDROCHLORIDE 5 MG: 5 TABLET ORAL at 08:01

## 2024-01-21 RX ADMIN — METRONIDAZOLE 500 MG: 500 TABLET ORAL at 09:01

## 2024-01-21 RX ADMIN — MEROPENEM 500 MG: 500 INJECTION INTRAVENOUS at 11:01

## 2024-01-21 RX ADMIN — POLYETHYLENE GLYCOL 3350 17 G: 17 POWDER, FOR SOLUTION ORAL at 08:01

## 2024-01-21 RX ADMIN — OXYBUTYNIN CHLORIDE 5 MG: 5 TABLET ORAL at 09:01

## 2024-01-21 RX ADMIN — POTASSIUM & SODIUM PHOSPHATES POWDER PACK 280-160-250 MG 1 PACKET: 280-160-250 PACK at 04:01

## 2024-01-21 RX ADMIN — DRONABINOL 5 MG: 2.5 CAPSULE ORAL at 09:01

## 2024-01-21 RX ADMIN — Medication 1 ENEMA: at 03:01

## 2024-01-21 NOTE — SUBJECTIVE & OBJECTIVE
Interval History:   NAEON. HD held. Hemodynamics remained normal and stable. She was up to the chair a lot yesterday. Ate fairly well.   R CT - 600 cc, L CT - 475 cc        Medications:  Continuous Infusions:      Scheduled Meds:   droNABinol  5 mg Oral BID    epoetin noble (PROCRIT) injection  4,000 Units Intravenous Every Mon, Wed, Fri    insulin detemir U-100  6 Units Subcutaneous Daily    LIDOcaine  1 patch Transdermal Q24H    meropenem (MERREM) IVPB  500 mg Intravenous Q12H    midodrine  5 mg Oral BID WM    oxybutynin  5 mg Oral TID    pantoprazole  40 mg Oral Daily    polyethylene glycol  17 g Oral Daily    senna-docusate 8.6-50 mg  1 tablet Oral Daily     PRN Meds:0.9%  NaCl infusion (for blood administration), sodium chloride 0.9%, acetaminophen, albuterol sulfate, bisacodyL, dextrose 10%, dextrose 10%, glucagon (human recombinant), glucose, glucose, heparin (porcine), hydrALAZINE, insulin aspart U-100, midodrine, ondansetron, sodium chloride 0.9%     Review of patient's allergies indicates:   Allergen Reactions    Crestor [rosuvastatin] Swelling    Gluten protein      Objective:     Vital Signs (Most Recent):  Temp: 97.6 °F (36.4 °C) (01/21/24 0730)  Pulse: 78 (01/21/24 0745)  Resp: 13 (01/21/24 0745)  BP: (!) 92/53 (01/21/24 0730)  SpO2: 99 % (01/21/24 0745) Vital Signs (24h Range):  Temp:  [97.6 °F (36.4 °C)-98.2 °F (36.8 °C)] 97.6 °F (36.4 °C)  Pulse:  [72-86] 78  Resp:  [9-31] 13  SpO2:  [79 %-100 %] 99 %  BP: ()/(50-70) 92/53     Intake/Output - Last 3 Shifts         01/19 0700 01/20 0659 01/20 0700 01/21 0659 01/21 0700 01/22 0659    P.O.  870     I.V. (mL/kg)  2.1 (0)     Blood 542.5      IV Piggyback 1691 194.2     Total Intake(mL/kg) 2233.5 (29.6) 1066.3 (15.6)     Other       Chest Tube 750 1075     Total Output 750 1075     Net +1483.5 -8.7                    SpO2: 99 %        Physical Exam  Vitals and nursing note reviewed.   Constitutional:       Appearance: Normal appearance.   HENT:       Head: Normocephalic and atraumatic.      Nose: Nose normal.   Cardiovascular:      Rate and Rhythm: Normal rate and regular rhythm.      Pulses: Normal pulses.   Pulmonary:      Effort: Pulmonary effort is normal. No respiratory distress.      Comments: Right posterior chest incision c/d/I    Right chest permacath    Right Chest tube, 10 fr, serosanguinous output, waterseal   Left chest tube, 10 fr, serosanguinous output, waterseal   Abdominal:      General: Abdomen is flat. There is no distension.      Palpations: Abdomen is soft.      Tenderness: There is no abdominal tenderness.      Comments: Soft, nontender abdomen   Musculoskeletal:      Right lower leg: Edema present.      Left lower leg: Edema present.   Skin:     General: Skin is warm and dry.      Coloration: Skin is pale.      Comments: Groin soft, dressings c/d/i   Neurological:      General: No focal deficit present.      Mental Status: She is alert.            Significant Labs:  CBC:   Recent Labs   Lab 01/21/24  0236   WBC 17.79*   RBC 2.96*   HGB 8.8*   HCT 27.0*      MCV 91   MCH 29.7   MCHC 32.6         Significant Diagnostics:  I have reviewed and interpreted all pertinent imaging results/findings within the past 24 hours.    VTE Risk Mitigation (From admission, onward)           Ordered     heparin 25,000 units in dextrose 5% 250 mL (100 units/mL) infusion LOW INTENSITY nomogram - [facility]  Continuous        Question:  Begin at (units/kg/hr)  Answer:  16    01/15/24 1038     heparin (porcine) injection 1,000 Units  As needed (PRN)         01/11/24 1351     heparin (porcine) injection 1,000 Units  As needed (PRN)         01/03/24 1123     IP VTE HIGH RISK PATIENT  Once         01/02/24 1226     Place CARMELITA hose  Until discontinued         01/02/24 1226     Place sequential compression device  Until discontinued         01/02/24 1226

## 2024-01-21 NOTE — PLAN OF CARE
Infectious Disease Note      Case discussed with member of SICU team. Patient not a candidate for vascular access for IV antibiotic therapy in the setting of AV fistula.    Recommendations:  Can transition ceftriaxone to cefpodoxime (renally dosed) if patient is able to tolerate oral intake. Would continue cefpodoxime for same duration as stated in prior ID note.  Would also continue metronidazole as per prior plan.  Please see prior ID notes for duration of therapy and other work up.    Infectious Diseases will sign off. Please call if questions arise.  Rosio Rivera MD, [facility]  Infectious Diseases

## 2024-01-21 NOTE — ASSESSMENT & PLAN NOTE
Lily Green is a 73 y.o. 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 Currently being treated with empyema. ESRD on dialysis.     Fistula feels like there is adequate flow but will check with HD US by vascular lab. Unfortunately, she has anasarca and the fistula does feel a bit deep but will have a formal measurement and recommendation after HD US is performed.     Recommend continue using tunneled line for the time being

## 2024-01-21 NOTE — PLAN OF CARE
SICU PLAN OF CARE NOTE    Dx: Malignant carcinoid tumor of bronchus    Goals of Care: MAP >55, SBP <180    Vital Signs (last 12 hours):   Temp:  [97.6 °F (36.4 °C)-97.8 °F (36.6 °C)]   Pulse:  [72-84]   Resp:  [9-31]   BP: ()/(51-66)   SpO2:  [79 %-100 %]      Neuro: AAO x4, Follows Commands, and Moves All Extremities    Cardiac: NSR 70-80s    Respiratory: Room Air    Gtts: None    Urine Output: Anuric    Dialysis: Intermittent HD    Drains: Left Pleural CT 50 cc/shift; Right Pleural CT 50 cc/shift    Diet: Diabetic/Renal/Gluten-free/Soft, bite-sized Diet     Labs/Accuchecks: Daily AM labs; AC/HS Accuchecks    Skin:  No signs of skin breakdown or redness noted over bony prominences. Sacral and heel foam dressings in place. Patient repositioned Q2H and as needed. Immerse bed plugged in and working correctly     Shift Events:  Replaced phosphorus per orders. PRN Tylenol administered for pain control. OOBTC. See flowsheet for further assessment/details.  Family updated on current condition/plan of care, questions answered, and emotional support provided.  MD updated on current condition, vitals, labs, and gtts.  No new orders received, will continue to monitor.

## 2024-01-21 NOTE — CLINICAL REVIEW
Nephrology Chart Review      Intake/Output Summary (Last 24 hours) at 1/21/2024 0903  Last data filed at 1/21/2024 0300  Gross per 24 hour   Intake 584.75 ml   Output 860 ml   Net -275.25 ml       Vitals:    01/21/24 0745 01/21/24 0800 01/21/24 0815 01/21/24 0830   BP:  (!) 98/53  (!) 108/57   BP Location:  Right arm     Patient Position:  Sitting     Pulse: 78 79 78 85   Resp: 13 11 13 10   Temp:       TempSrc:       SpO2: 99% 99% 100% 100%   Weight:       Height:           Recent Labs   Lab 01/19/24  1513 01/20/24  0104 01/21/24  0236    134* 137   K 3.4* 4.0 4.5    106 107   CO2 25 23 24   BUN 16 17 34*   CREATININE 1.2 1.4 2.5*   CALCIUM 8.3* 7.7* 8.2*   PHOS 1.4* 1.9* 2.5*       Stable, no acute needs for RRT at this time.   Will tentatively plan for Another HD trial on Monday 01/22     No other related issues identified. Please call as needed; We will continue to follow.    Henri Carlisle

## 2024-01-21 NOTE — PLAN OF CARE
SICU PLAN OF CARE NOTE    Dx: Malignant carcinoid tumor of bronchus    Goals of Care: MAP > 55, SBP > 90, < 180    Vital Signs (last 12 hours):   Temp:  [97.6 °F (36.4 °C)-98.2 °F (36.8 °C)]   Pulse:  [74-86]   Resp:  [11-27]   BP: ()/(50-76)   SpO2:  [96 %-100 %]      Neuro: AAO x4, Follows Commands, and Moves All Extremities     Cardiac: NSR    Respiratory: Room Air    Gtts: None    Urine Output: Anuric    Dialysis: intermittent HD    Drains:   R. Pleural Chest Tube, total output 550 cc /  shift  L. Pleural Chest Tube, total output 425 cc /  shift    Diet: Diabetic, renal, gluten free, soft/bite sized bites     Labs/Accuchecks: Daily Labs ; Accuchecks ACHS    Skin:  No signs of skin breakdown or redness noted over bony prominences. Sacral and heel foam dressings in place. Patient repositioned Q2H and as needed. Immerse bed plugged in and working correctly     Shift Events:    No acute events throughout the shift. Patient up out of bed and in the chair for part of the shift.     Plan of care reviewed with the patient and her family and all questions answered at this time.

## 2024-01-21 NOTE — SUBJECTIVE & OBJECTIVE
Facility-Administered Medications Prior to Admission   Medication Dose Route Frequency Provider Last Rate Last Admin    capsaicin-skin cleanser patch 1 patch  1 patch Topical (Top) 1 time in Clinic/HOD Asad Verduzco MD        capsaicin-skin cleanser patch 2 patch  2 patch Topical (Top) 1 time in Clinic/HOD Asad Verduzco MD         Medications Prior to Admission   Medication Sig Dispense Refill Last Dose    atorvastatin (LIPITOR) 40 MG tablet Take 1 tablet (40 mg total) by mouth once daily. (Patient taking differently: Take 40 mg by mouth every evening.) 90 tablet 3 1/1/2024    epoetin noble-epbx (RETACRIT) 10,000 unit/mL imjection Inject 1 mL (10,000 Units total) into the skin every Mon, Wed, Fri. HOLD IF HEMOGLOBIN is 10 or GREATER    DO NOT SHAKE  DO NOT DILUTE  DO NOT MIX with other drug solutions 12 mL 0 1/1/2024 at 0900    ferrous gluconate (FERGON) 324 MG tablet Take 1 tablet (324 mg total) by mouth daily with breakfast. 30 tablet 3 1/1/2024 at 2200    meclizine (ANTIVERT) 25 mg tablet Take 1 tablet (25 mg total) by mouth 3 (three) times daily as needed for Dizziness. 90 tablet 3 1/1/2024 at 0800    melatonin (MELATIN) 3 mg tablet Take 3 tablets (9 mg total) by mouth nightly as needed for Insomnia. 90 tablet 3 1/1/2024 at 2100    midodrine (PROAMATINE) 5 MG Tab Take 1 tablet (5 mg total) by mouth 2 (two) times daily as needed (if BP is systolic below 90 mmHg and patient is symptomatic. Also if BP is below 90 mmHg during dialysis). 60 tablet 3 1/2/2024    pantoprazole (PROTONIX) 40 MG tablet Take 1 tablet (40 mg total) by mouth once daily. 90 tablet 3 1/1/2024 at 0800    patiromer calcium sorbitex (VELTASSA) 8.4 gram PwPk Take 2 packets (16.8 g total) by mouth once daily. 180 packet 2 1/1/2024 at 2100    sevelamer HCL (RENAGEL) 800 MG Tab Take 2 tablets (1,600 mg total) by mouth 3 (three) times daily with meals. 180 tablet 11 1/1/2024 at 1800    torsemide 40 mg Tab Take 40 mg by mouth once daily. 30  "tablet 3 1/1/2024 at 2100    B complex-vitamin C-folic acid (MELLISSA-BENJY) 0.8 mg Tab Take 1 tablet (0.8 mg total) by mouth once daily. 30 tablet 3 12/28/2023 at 0800    betahistine HCl (BETAHISTINE, BULK, MISC)    Unknown    blood sugar diagnostic (TRUE METRIX GLUCOSE TEST STRIP) Strp USE ONE STRIP FOR TESTING 2 TIMES A  each 11     blood-glucose meter kit Use to test twice a day 1 each 0     calcium-vitamin D3 (OS-CIPRIANO 500 + D3) 500 mg-5 mcg (200 unit) per tablet Take 1 tablet by mouth once daily. 30 tablet 3 12/28/2023 at 0800    coenzyme Q10 100 mg capsule    More than a month at 0800    dicyclomine (BENTYL) 10 MG capsule    Unknown    dronabinoL (MARINOL) 5 MG capsule Take 1 capsule (5 mg total) by mouth 2 (two) times daily before meals. 60 capsule 1 Unknown    econazole nitrate 1 % cream Apply topically 2 (two) times daily. 30 g 2 Unknown    insulin aspart U-100 (NOVOLOG) 100 unit/mL (3 mL) InPn pen Inject before meals:  150-200=+1, 201-250=+2, 251-300=+3; 301-350=+4, over 350=+5 units 15 mL 0 12/31/2023 at 1800    lancets Misc 1 lancet by Misc.(Non-Drug; Combo Route) route 4 (four) times daily. 400 each 3     mometasone (ELOCON) 0.1 % ointment Apply topically.   Unknown    neomycin (MYCIFRADIN) 500 mg Tab    Unknown    nutritional supplements Liqd Take 237 mLs by mouth 4 (four) times daily. 120 each 6 More than a month    oxyCODONE (ROXICODONE) 5 MG immediate release tablet Take 5 mg by mouth every 4 (four) hours as needed.   More than a month    pen needle, diabetic (BD ULTRA-FINE MARIS PEN NEEDLE) 32 gauge x 5/32" Ndle 1 Device by Misc.(Non-Drug; Combo Route) route 4 (four) times daily with meals and nightly. 400 each 3     pregabalin (LYRICA) 150 MG capsule TAKE ONE CAPSULE BY MOUTH 2 TIMES A DAY 60 capsule 2 Unknown    pregabalin (LYRICA) 75 MG capsule Take 1 capsule (75 mg total) by mouth Daily. Give after HD 30 capsule 0 Unknown    simethicone (MYLICON) 125 mg Cap capsule Take by mouth.   Unknown    " vit C,W-Iv-wkhvt-lutein-zeaxan (PRESERVISION AREDS 2) 982-563-64-1 mg-unit-mg-mg Cap    Unknown    vitamins  A,C,E-zinc-copper [phone] unit-mg-unit Cap Take 1 tablet by mouth once daily.   Unknown       Review of patient's allergies indicates:   Allergen Reactions    Crestor [rosuvastatin] Swelling    Gluten protein        Past Medical History:   Diagnosis Date    Anxiety     Celiac disease 2018    Celiac disease     Depression     Diabetes mellitus     Family history of breast cancer in mother      at age 68    Hyperlipidemia     Hypertension     Meniere disease     Meniere's disease, unspecified ear     Menopause     Peptic ulcer     Reflux esophagitis     Urinary tract infection     Vaginal infection     Vaginal Pap smear 2014    Pap/Hpv Negative      Past Surgical History:   Procedure Laterality Date    APPENDECTOMY      BREAST SURGERY      Tags    CARPAL TUNNEL RELEASE Bilateral 2017    ,     CLOSURE, COLOSTOMY Left 2023    Procedure: CLOSURE, COLOSTOMY;  Surgeon: Manuel Javier MD;  Location: [facility] OR;  Service: General;  Laterality: Left;    COLONOSCOPY  2018    Normal  (Cornell Velazco)     COLOSTOMY Right 2023    Procedure: CREATION, COLOSTOMY;  Surgeon: Manuel Javier MD;  Location: [facility] OR;  Service: General;  Laterality: Right;    DILATION AND CURETTAGE OF UTERUS  1986    DUPUYTREN CONTRACTURE RELEASE Bilateral 2017    HYSTERECTOMY      BEAU w/ zoyay, no BSO     IMPLANTATION OF LEADLESS PACEMAKER N/A 2024    Procedure: INSERTION, CARDIAC PACEMAKER, LEADLESS;  Surgeon: LENIN Frances MD;  Location: [facility] EP LAB;  Service: Cardiology;  Laterality: N/A;  SB, LEADLESS PPM (MICRA), MDT, ANES, EH, CVICU [id number]    INJECTION OF NEUROLYTIC AGENT AROUND LUMBAR SYMPATHETIC NERVE N/A 2022    Procedure: BLOCK, LUMBAR SYMPATHETIC;  Surgeon: Souleymane Rizo Jr., MD;  Location: [facility] PAIN MGT;  Service: Pain Management;  Laterality: N/A;  no pacemaker   pt  is diabetic     INJECTION OF NEUROLYTIC AGENT AROUND LUMBAR SYMPATHETIC NERVE N/A 8/25/2022    Procedure: BLOCK, LUMBAR SYMPATHETIC;  Surgeon: Souleymane Rizo Jr., MD;  Location: [facility] PAIN MGT;  Service: Pain Management;  Laterality: N/A;  diabetic     INSERTION OF TUNNELED CENTRAL VENOUS HEMODIALYSIS CATHETER Right 1/25/2023    Procedure: INSERTION, CATHETER, HEMODIALYSIS, DUAL LUMEN;  Surgeon: Manuel Javier MD;  Location: [facility] OR;  Service: General;  Laterality: Right;    INSERTION, CATHETER, TUNNELED Left 1/28/2023    Procedure: Insertion,catheter,tunneled;  Surgeon: Watson Fontenot MD;  Location: [facility] OR;  Service: General;  Laterality: Left;    LAPAROTOMY, EXPLORATORY N/A 1/14/2023    Procedure: LAPAROTOMY, EXPLORATORY;  Surgeon: Manuel Javier MD;  Location: [facility] OR;  Service: General;  Laterality: N/A;    LAPAROTOMY, EXPLORATORY N/A 1/16/2023    Procedure: LAPAROTOMY, EXPLORATORY;  Surgeon: Manuel Javier MD;  Location: [facility] OR;  Service: General;  Laterality: N/A;    LYMPHADENECTOMY Right 1/2/2024    Procedure: LYMPHADENECTOMY;  Surgeon: Tan Thomson MD;  Location: [facility] OR 2ND FLR;  Service: Cardiothoracic;  Laterality: Right;    PACEMAKER, TEMPORARY, TRANSVENOUS, PEDIATRIC Right 1/12/2024    Procedure: Pacemaker, Temporary, Transvenous;  Surgeon: Todd Carlisle MD;  Location: [facility] CATH LAB;  Service: Cardiology;  Laterality: Right;    REMOVAL OF CATHETER Right 1/28/2023    Procedure: REMOVAL, CATHETER;  Surgeon: Watson Fontenot MD;  Location: [facility] OR;  Service: General;  Laterality: Right;    REMOVAL, TUNNELED CATH Right 1/25/2023    Procedure: REMOVAL, TUNNELED CATH;  Surgeon: Manuel Javier MD;  Location: [facility] OR;  Service: General;  Laterality: Right;    ROBOTIC BRONCHOSCOPY N/A 10/20/2023    Procedure: ROBOTIC BRONCHOSCOPY;  Surgeon: Mayda Monzon MD;  Location: [facility] OR 2ND FLR;  Service: Pulmonary;  Laterality: N/A;    SHOULDER SURGERY  2009 & 2010    right rotator  cuff    THORACOTOMY Right 1/2/2024    Procedure: THORACOTOMY;  Surgeon: Tan Thomson MD;  Location: [facility] OR [address] [place] [address];  Service: Cardiothoracic;  Laterality: Right;    THORACOTOMY, WITH INITIAL THERAPEUTIC WEDGE RESECTION OF LUNG Right 1/2/2024    Procedure: THORACOTOMY, WITH INITIAL THERAPEUTIC WEDGE RESECTION OF LUNG;  Surgeon: Tan Thomson MD;  Location: [facility] OR [address] [place] [address];  Service: Cardiothoracic;  Laterality: Right;    TONSILLECTOMY      UPPER GASTROINTESTINAL ENDOSCOPY  04/2018     Family History       Problem Relation (Age of Onset)    Arthritis Father    Breast cancer Mother, Paternal Aunt    Cancer Mother, Paternal Aunt    Diabetes Paternal Grandfather    Hyperlipidemia Sister    Vision loss Father, Mother, Sister          Tobacco Use    Smoking status: Never    Smokeless tobacco: Never   Substance and Sexual Activity    Alcohol use: No    Drug use: Never    Sexual activity: Not Currently     Partners: Male     Birth control/protection: See Surgical Hx     Comment: :      Review of Systems   Constitutional:  Positive for fatigue. Negative for chills, fever and unexpected weight change.   HENT:  Negative for ear pain, facial swelling, hearing loss, mouth sores, nosebleeds, rhinorrhea, sinus pressure, sore throat, tinnitus, trouble swallowing and voice change.    Eyes:  Negative for photophobia, pain, redness and visual disturbance.   Respiratory:  Positive for cough and shortness of breath. Negative for choking, chest tightness and wheezing.    Cardiovascular:  Negative for chest pain, palpitations and leg swelling.   Gastrointestinal:  Positive for nausea. Negative for abdominal pain, blood in stool, constipation, diarrhea and vomiting.   Endocrine: Negative for cold intolerance, heat intolerance, polydipsia, polyphagia and polyuria.   Genitourinary:  Negative for decreased urine volume, dysuria, flank pain, frequency, hematuria, menstrual problem, urgency, vaginal bleeding, vaginal discharge  and vaginal pain.   Musculoskeletal:  Negative for arthralgias, back pain, joint swelling, myalgias and neck pain.   Skin:  Negative for rash.   Allergic/Immunologic: Negative for environmental allergies, food allergies and immunocompromised state.   Neurological:  Negative for dizziness, seizures, syncope, weakness, light-headedness, numbness and headaches.   Hematological:  Negative for adenopathy. Does not bruise/bleed easily.   Psychiatric/Behavioral:  Negative for confusion, hallucinations, self-injury, sleep disturbance and suicidal ideas. The patient is not nervous/anxious.      Objective:     Vital Signs (Most Recent):  Temp: 97.6 °F (36.4 °C) (01/21/24 0730)  Pulse: 84 (01/21/24 1045)  Resp: (!) 24 (01/21/24 1045)  BP: (!) 108/58 (01/21/24 1030)  SpO2: 100 % (01/21/24 1045) Vital Signs (24h Range):  Temp:  [97.6 °F (36.4 °C)-98.2 °F (36.8 °C)] 97.6 °F (36.4 °C)  Pulse:  [72-86] 84  Resp:  [9-31] 24  SpO2:  [79 %-100 %] 100 %  BP: ()/(50-66) 108/58     Weight: 68.2 kg (150 lb 5.7 oz)  Body mass index is 25.02 kg/m².      Physical Exam  Vitals and nursing note reviewed.   Constitutional:       Appearance: Normal appearance.   HENT:      Head: Normocephalic and atraumatic.      Nose: Nose normal.   Cardiovascular:      Rate and Rhythm: Normal rate and regular rhythm.      Pulses: Normal pulses.      Comments: Left basilic vein transposition with strong thrill. Arm is edematous. Lefr radial artery pulse not palpable, becomes 2+ with compression of fistula.   Pulmonary:      Effort: Pulmonary effort is normal. No respiratory distress.      Comments: Right posterior chest incision c/d/I    Right chest permacath    Right Chest tube, 10 fr, serosanguinous output, waterseal   Left chest tube, 10 fr, serosanguinous output, waterseal   Abdominal:      General: Abdomen is flat. There is no distension.      Palpations: Abdomen is soft.      Tenderness: There is no abdominal tenderness.      Comments: Soft,  nontender abdomen   Musculoskeletal:      Right lower leg: Edema present.      Left lower leg: Edema present.   Skin:     General: Skin is warm and dry.      Coloration: Skin is pale.      Comments: Groin soft, dressings c/d/i   Neurological:      General: No focal deficit present.      Mental Status: She is alert.          Significant Labs:  Recent Lab Results  (Last 5 results in the past 24 hours)        01/21/24  0821   01/21/24  0236   01/20/24  2021   01/20/24  1654   01/20/24  1152        Albumin   1.9             ALP   97             ALT   9             Anion Gap   6             AST   15             Baso #   0.03       0.06       Basophil %   0.2       0.3       BILIRUBIN TOTAL   0.6  Comment: For infants and newborns, interpretation of results should be based  on gestational age, weight and in agreement with clinical  observations.    Premature Infant recommended reference ranges:  Up to 24 hours.............<8.0 mg/dL  Up to 48 hours............<12.0 mg/dL  3-5 days..................<15.0 mg/dL  6-29 days.................<15.0 mg/dL               BUN   34             Calcium   8.2             Chloride   107             CO2   24             Creatinine   2.5             Differential Method   Automated       Automated       eGFR   19.8             Eos #   0.1       0.1       Eosinophil %   0.5       0.3       Glucose   125             Gran # (ANC)   14.8       15.8       Gran %   83.0       84.3       Hematocrit   27.0       26.8       Hemoglobin   8.8       8.6       Immature Grans (Abs)   0.40  Comment: Mild elevation in immature granulocytes is non specific and   can be seen in a variety of conditions including stress response,   acute inflammation, trauma and pregnancy. Correlation with other   laboratory and clinical findings is essential.         0.44  Comment: Mild elevation in immature granulocytes is non specific and   can be seen in a variety of conditions including stress response,   acute  inflammation, trauma and pregnancy. Correlation with other   laboratory and clinical findings is essential.         Immature Granulocytes   2.2       2.3       Lymph #   1.1       1.1       Lymph %   6.1       6.0       Magnesium    2.1             MCH   29.7       29.7       MCHC   32.6       32.1       MCV   91       92       Mono #   1.4       1.3       Mono %   8.0       6.8       MPV   11.9       11.9       nRBC   0       0       Phosphorus Level   2.5             Platelet Count   181       205       POCT Glucose 169     129   114         Potassium   4.5             PROTEIN TOTAL   4.9             RBC   2.96       2.90       RDW   22.1       21.9       Sodium   137             WBC   17.79       18.74                              Significant Diagnostics:  I have reviewed all pertinent imaging results/findings within the past 24 hours.

## 2024-01-21 NOTE — HPI
Lily Green is a 73 y.o. 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 Post op course notable for persistent afib rvr, SBO (now resolved), tachy-eulogio syndrome and asystolic arrest x3 s/p emergent TVP placement on 1/12 and s/p PPM placement on 1/16/24, bilateral pleural effusions s/p R chest tube placement into loculated effusion on 1/17/24.     Patient has a history of ESRD and had a left basilic vein transposition performed at [facility] on 10/13. Vascular surgery consulted for evaluation and possible use of fistula given patients infectious problem and current utilization of tunneled dialysis catheter.

## 2024-01-21 NOTE — ASSESSMENT & PLAN NOTE
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 Stepped up to the ICU 1/5 for afib RVR which was refractory to medical management and c/b development of tachy-eulogio syndrome now s/p micra placement 1/16 for tachy-eulogio syndrome in the setting of RVR. Did have brief SBO type picture 1/7 which resolved with conservative management.     - s/p R thoracotomy with R lower lobectomy for carcinoid w/ MLND; IPV injury requiring repair. CT removed Post operatively.  - Refractory afib RVR requiring ICU step up 1/05, s/p chemical cardioversion initially with recurrence c/b developing tachy-eulogio syndrome     - cards and EP following, appreciate assistance     - s/p TVP placement and now micra implantation 1/16    - hold hep gtt 5 days post op (resume tomorrow 1/22)  - CT chest 1/13 w/ bilateral pleural effusion (R>L), pericardial effusion,   - s/p R chest tube placement with IR 1/17, L chest tube placement 1/18; Keep today and may stay to waterseal  - Daily CXR while CT are in place   - Pulmonary toilet: IS, Acapella, Mucomyst daily   - Infectious work up 2/2 leukocytosis. She remains afebrile. Effusions and compressive atelectasis also likely contributing to leukocytosis.Trended up to 30k 1/16, now downtrending. Zosyn 1/11-1/15; Meropenam 1/16 - 1/20; Plan for Ceftriaxone and metronidazole to start 1/21 per ID. ?4 week abx plan without clear cultures, will clarify with ID.     - ID following, appreciate assistance. Reconsult following thora results.   - Bcx 1/11: Staph, probable contaminant  - Bcx1/12: NGTD   - Rapid blood PCR: staph epi  - Sputum Cx 1/11: klebsiella & e. Coli  - R pleural fluid Cx 1/17: NGTD  - SBO type episode 1/07 requiring NGT decompression, which resolved with conservative management   - Renal, gluten free diet,  novasource renal BID; marinol for appetite   - Bowel regimen, PPI  - Nephrology following, appreciate assistance. CRRT 1/17 overnight. HD trial 1/19 passed but requiring midodrine, 1.5 L LR, metoprolol for RVR. Repeat HD per nephro   - LUE AVF created 10.2023, likely mature for use? No US imaging to date in our system. SICU team to touch base with vascular regarding evaluation, potential use.   - Oxybutinin for bladder spasms   - MM pain control as needed  - Continue PT/OT, mobilization, likely post hospital placement         Dispo: remain in ICU for repeat HD trial with midodrine prior to dialysis.

## 2024-01-21 NOTE — CONSULTS
[facility]  Vascular Surgery  Consult Note    Inpatient consult to Vascular Surgery  Consult performed by: Butch Brown MD  Consult ordered by: Prabhu Esqueda MD        Subjective:     Chief Complaint/Reason for Admission: Fistula okay to use?    History of Present Illness: Lily Green is a 73 y.o. 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 Post op course notable for persistent afib rvr, SBO (now resolved), tachy-eulogio syndrome and asystolic arrest x3 s/p emergent TVP placement on 1/12 and s/p PPM placement on 1/16/24, bilateral pleural effusions s/p R chest tube placement into loculated effusion on 1/17/24.     Patient has a history of ESRD and had a left basilic vein transposition performed at [facility] on 10/13. Vascular surgery consulted for evaluation and possible use of fistula given patients infectious problem and current utilization of tunneled dialysis catheter.         Facility-Administered Medications Prior to Admission   Medication Dose Route Frequency Provider Last Rate Last Admin    capsaicin-skin cleanser patch 1 patch  1 patch Topical (Top) 1 time in Clinic/HOD Asad Verduzco MD        capsaicin-skin cleanser patch 2 patch  2 patch Topical (Top) 1 time in Clinic/HOD Asad Verduzco MD         Medications Prior to Admission   Medication Sig Dispense Refill Last Dose    atorvastatin (LIPITOR) 40 MG tablet Take 1 tablet (40 mg total) by mouth once daily. (Patient taking differently: Take 40 mg by mouth every evening.) 90 tablet 3 1/1/2024    epoetin noble-epbx (RETACRIT) 10,000 unit/mL imjection Inject 1 mL (10,000 Units total) into the skin every Mon, Wed, Fri. HOLD IF HEMOGLOBIN is 10 or GREATER    DO NOT SHAKE  DO NOT DILUTE  DO NOT MIX with other drug solutions 12 mL 0 1/1/2024 at 0900    ferrous gluconate  (FERGON) 324 MG tablet Take 1 tablet (324 mg total) by mouth daily with breakfast. 30 tablet 3 1/1/2024 at 2200    meclizine (ANTIVERT) 25 mg tablet Take 1 tablet (25 mg total) by mouth 3 (three) times daily as needed for Dizziness. 90 tablet 3 1/1/2024 at 0800    melatonin (MELATIN) 3 mg tablet Take 3 tablets (9 mg total) by mouth nightly as needed for Insomnia. 90 tablet 3 1/1/2024 at 2100    midodrine (PROAMATINE) 5 MG Tab Take 1 tablet (5 mg total) by mouth 2 (two) times daily as needed (if BP is systolic below 90 mmHg and patient is symptomatic. Also if BP is below 90 mmHg during dialysis). 60 tablet 3 1/2/2024    pantoprazole (PROTONIX) 40 MG tablet Take 1 tablet (40 mg total) by mouth once daily. 90 tablet 3 1/1/2024 at 0800    patiromer calcium sorbitex (VELTASSA) 8.4 gram PwPk Take 2 packets (16.8 g total) by mouth once daily. 180 packet 2 1/1/2024 at 2100    sevelamer HCL (RENAGEL) 800 MG Tab Take 2 tablets (1,600 mg total) by mouth 3 (three) times daily with meals. 180 tablet 11 1/1/2024 at 1800    torsemide 40 mg Tab Take 40 mg by mouth once daily. 30 tablet 3 1/1/2024 at 2100    B complex-vitamin C-folic acid (MELLISSA-BENJY) 0.8 mg Tab Take 1 tablet (0.8 mg total) by mouth once daily. 30 tablet 3 12/28/2023 at 0800    betahistine HCl (BETAHISTINE, BULK, MISC)    Unknown    blood sugar diagnostic (TRUE METRIX GLUCOSE TEST STRIP) Strp USE ONE STRIP FOR TESTING 2 TIMES A  each 11     blood-glucose meter kit Use to test twice a day 1 each 0     calcium-vitamin D3 (OS-CIPRIANO 500 + D3) 500 mg-5 mcg (200 unit) per tablet Take 1 tablet by mouth once daily. 30 tablet 3 12/28/2023 at 0800    coenzyme Q10 100 mg capsule    More than a month at 0800    dicyclomine (BENTYL) 10 MG capsule    Unknown    dronabinoL (MARINOL) 5 MG capsule Take 1 capsule (5 mg total) by mouth 2 (two) times daily before meals. 60 capsule 1 Unknown    econazole nitrate 1 % cream Apply topically 2 (two) times daily. 30 g 2 Unknown     "insulin aspart U-100 (NOVOLOG) 100 unit/mL (3 mL) InPn pen Inject before meals:  150-200=+1, 201-250=+2, 251-300=+3; 301-350=+4, over 350=+5 units 15 mL 0 2023 at 1800    lancets Misc 1 lancet by Misc.(Non-Drug; Combo Route) route 4 (four) times daily. 400 each 3     mometasone (ELOCON) 0.1 % ointment Apply topically.   Unknown    neomycin (MYCIFRADIN) 500 mg Tab    Unknown    nutritional supplements Liqd Take 237 mLs by mouth 4 (four) times daily. 120 each 6 More than a month    oxyCODONE (ROXICODONE) 5 MG immediate release tablet Take 5 mg by mouth every 4 (four) hours as needed.   More than a month    pen needle, diabetic (BD ULTRA-FINE MARIS PEN NEEDLE) 32 gauge x 5/32" Ndle 1 Device by Misc.(Non-Drug; Combo Route) route 4 (four) times daily with meals and nightly. 400 each 3     pregabalin (LYRICA) 150 MG capsule TAKE ONE CAPSULE BY MOUTH 2 TIMES A DAY 60 capsule 2 Unknown    pregabalin (LYRICA) 75 MG capsule Take 1 capsule (75 mg total) by mouth Daily. Give after HD 30 capsule 0 Unknown    simethicone (MYLICON) 125 mg Cap capsule Take by mouth.   Unknown    vit C,S-Np-cevel-lutein-zeaxan (PRESERVISION AREDS 2) 191-580-00-1 mg-unit-mg-mg Cap    Unknown    vitamins  A,C,E-zinc-copper 14,320-226-200 unit-mg-unit Cap Take 1 tablet by mouth once daily.   Unknown       Review of patient's allergies indicates:   Allergen Reactions    Crestor [rosuvastatin] Swelling    Gluten protein        Past Medical History:   Diagnosis Date    Anxiety     Celiac disease 2018    Celiac disease     Depression     Diabetes mellitus     Family history of breast cancer in mother      at age 68    Hyperlipidemia     Hypertension     Meniere disease     Meniere's disease, unspecified ear     Menopause     Peptic ulcer     Reflux esophagitis     Urinary tract infection     Vaginal infection     Vaginal Pap smear 2014    Pap/Hpv Negative      Past Surgical History:   Procedure Laterality Date    APPENDECTOMY      " BREAST SURGERY      Tags    CARPAL TUNNEL RELEASE Bilateral 09/2017    ,     CLOSURE, COLOSTOMY Left 1/16/2023    Procedure: CLOSURE, COLOSTOMY;  Surgeon: Manuel Javier MD;  Location: [facility] OR;  Service: General;  Laterality: Left;    COLONOSCOPY  04/2018    Normal  ([place])     COLOSTOMY Right 1/16/2023    Procedure: CREATION, COLOSTOMY;  Surgeon: Manuel Javier MD;  Location: [facility] OR;  Service: General;  Laterality: Right;    DILATION AND CURETTAGE OF UTERUS  1986    DUPUYTREN CONTRACTURE RELEASE Bilateral 09/2017    HYSTERECTOMY  1987    BEAU w/ appy, no BSO     IMPLANTATION OF LEADLESS PACEMAKER N/A 1/16/2024    Procedure: INSERTION, CARDIAC PACEMAKER, LEADLESS;  Surgeon: LENIN Frances MD;  Location: [facility] EP LAB;  Service: Cardiology;  Laterality: N/A;  SB, LEADLESS PPM (MICRA), MDT, ANES, EH, CVICU [id number]    INJECTION OF NEUROLYTIC AGENT AROUND LUMBAR SYMPATHETIC NERVE N/A 1/6/2022    Procedure: BLOCK, LUMBAR SYMPATHETIC;  Surgeon: Souleymane Rizo Jr., MD;  Location: [facility] PAIN MGT;  Service: Pain Management;  Laterality: N/A;  no pacemaker   pt is diabetic     INJECTION OF NEUROLYTIC AGENT AROUND LUMBAR SYMPATHETIC NERVE N/A 8/25/2022    Procedure: BLOCK, LUMBAR SYMPATHETIC;  Surgeon: Souleymane Rizo Jr., MD;  Location: [facility] PAIN MGT;  Service: Pain Management;  Laterality: N/A;  diabetic     INSERTION OF TUNNELED CENTRAL VENOUS HEMODIALYSIS CATHETER Right 1/25/2023    Procedure: INSERTION, CATHETER, HEMODIALYSIS, DUAL LUMEN;  Surgeon: Manuel Javier MD;  Location: [facility] OR;  Service: General;  Laterality: Right;    INSERTION, CATHETER, TUNNELED Left 1/28/2023    Procedure: Insertion,catheter,tunneled;  Surgeon: Watson Fontenot MD;  Location: [facility] OR;  Service: General;  Laterality: Left;    LAPAROTOMY, EXPLORATORY N/A 1/14/2023    Procedure: LAPAROTOMY, EXPLORATORY;  Surgeon: Manuel Javier MD;  Location: [facility] OR;  Service: General;  Laterality: N/A;    LAPAROTOMY, EXPLORATORY  N/A 1/16/2023    Procedure: LAPAROTOMY, EXPLORATORY;  Surgeon: Manuel Javier MD;  Location: [facility] OR;  Service: General;  Laterality: N/A;    LYMPHADENECTOMY Right 1/2/2024    Procedure: LYMPHADENECTOMY;  Surgeon: Tan Thomson MD;  [facility];  Service: Cardiothoracic;  Laterality: Right;    PACEMAKER, TEMPORARY, TRANSVENOUS, PEDIATRIC Right 1/12/2024    Procedure: Pacemaker, Temporary, Transvenous;  Surgeon: Todd Carlisle MD;  Location: [facility] CATH LAB;  Service: Cardiology;  Laterality: Right;    REMOVAL OF CATHETER Right 1/28/2023    Procedure: REMOVAL, CATHETER;  Surgeon: Watson Fontenot MD;  Location: [facility] OR;  Service: General;  Laterality: Right;    REMOVAL, TUNNELED CATH Right 1/25/2023    Procedure: REMOVAL, TUNNELED CATH;  Surgeon: Manuel Javier MD;  Location: [facility] OR;  Service: General;  Laterality: Right;    ROBOTIC BRONCHOSCOPY N/A 10/20/2023    Procedure: ROBOTIC BRONCHOSCOPY;  Surgeon: Mayda Monzon MD;  [facility];  Service: Pulmonary;  Laterality: N/A;    SHOULDER SURGERY  2009 & 2010    right rotator cuff    THORACOTOMY Right 1/2/2024    Procedure: THORACOTOMY;  Surgeon: Tan Thomson MD;  [facility];  Service: Cardiothoracic;  Laterality: Right;    THORACOTOMY, WITH INITIAL THERAPEUTIC WEDGE RESECTION OF LUNG Right 1/2/2024    Procedure: THORACOTOMY, WITH INITIAL THERAPEUTIC WEDGE RESECTION OF LUNG;  Surgeon: Tan Thomson MD;  [facility];  Service: Cardiothoracic;  Laterality: Right;    TONSILLECTOMY      UPPER GASTROINTESTINAL ENDOSCOPY  04/2018     Family History       Problem Relation (Age of Onset)    Arthritis Father    Breast cancer Mother, Paternal Aunt    Cancer Mother, Paternal Aunt    Diabetes Paternal Grandfather    Hyperlipidemia Sister    Vision loss Father, Mother, Sister          Tobacco Use    Smoking status: Never    Smokeless tobacco: Never   Substance and Sexual Activity    Alcohol use:  No    Drug use: Never    Sexual activity: Not Currently     Partners: Male     Birth control/protection: See Surgical Hx     Comment: :      Review of Systems   Constitutional:  Positive for fatigue. Negative for chills, fever and unexpected weight change.   HENT:  Negative for ear pain, facial swelling, hearing loss, mouth sores, nosebleeds, rhinorrhea, sinus pressure, sore throat, tinnitus, trouble swallowing and voice change.    Eyes:  Negative for photophobia, pain, redness and visual disturbance.   Respiratory:  Positive for cough and shortness of breath. Negative for choking, chest tightness and wheezing.    Cardiovascular:  Negative for chest pain, palpitations and leg swelling.   Gastrointestinal:  Positive for nausea. Negative for abdominal pain, blood in stool, constipation, diarrhea and vomiting.   Endocrine: Negative for cold intolerance, heat intolerance, polydipsia, polyphagia and polyuria.   Genitourinary:  Negative for decreased urine volume, dysuria, flank pain, frequency, hematuria, menstrual problem, urgency, vaginal bleeding, vaginal discharge and vaginal pain.   Musculoskeletal:  Negative for arthralgias, back pain, joint swelling, myalgias and neck pain.   Skin:  Negative for rash.   Allergic/Immunologic: Negative for environmental allergies, food allergies and immunocompromised state.   Neurological:  Negative for dizziness, seizures, syncope, weakness, light-headedness, numbness and headaches.   Hematological:  Negative for adenopathy. Does not bruise/bleed easily.   Psychiatric/Behavioral:  Negative for confusion, hallucinations, self-injury, sleep disturbance and suicidal ideas. The patient is not nervous/anxious.      Objective:     Vital Signs (Most Recent):  Temp: 97.6 °F (36.4 °C) (01/21/24 0730)  Pulse: 84 (01/21/24 1045)  Resp: (!) 24 (01/21/24 1045)  BP: (!) 108/58 (01/21/24 1030)  SpO2: 100 % (01/21/24 1045) Vital Signs (24h Range):  Temp:  [97.6 °F (36.4 °C)-98.2 °F (36.8  °C)] 97.6 °F (36.4 °C)  Pulse:  [72-86] 84  Resp:  [9-31] 24  SpO2:  [79 %-100 %] 100 %  BP: ()/(50-66) 108/58     Weight: 68.2 kg (150 lb 5.7 oz)  Body mass index is 25.02 kg/m².      Physical Exam  Vitals and nursing note reviewed.   Constitutional:       Appearance: Normal appearance.   HENT:      Head: Normocephalic and atraumatic.      Nose: Nose normal.   Cardiovascular:      Rate and Rhythm: Normal rate and regular rhythm.      Pulses: Normal pulses.      Comments: Left basilic vein transposition with strong thrill. Arm is edematous. Lefr radial artery pulse not palpable, becomes 2+ with compression of fistula.   Pulmonary:      Effort: Pulmonary effort is normal. No respiratory distress.      Comments: Right posterior chest incision c/d/I    Right chest permacath    Right Chest tube, 10 fr, serosanguinous output, waterseal   Left chest tube, 10 fr, serosanguinous output, waterseal   Abdominal:      General: Abdomen is flat. There is no distension.      Palpations: Abdomen is soft.      Tenderness: There is no abdominal tenderness.      Comments: Soft, nontender abdomen   Musculoskeletal:      Right lower leg: Edema present.      Left lower leg: Edema present.   Skin:     General: Skin is warm and dry.      Coloration: Skin is pale.      Comments: Groin soft, dressings c/d/i   Neurological:      General: No focal deficit present.      Mental Status: She is alert.          Significant Labs:  Recent Lab Results  (Last 5 results in the past 24 hours)        01/21/24  0821   01/21/24  0236   01/20/24  2021   01/20/24  1654   01/20/24  1152        Albumin   1.9             ALP   97             ALT   9             Anion Gap   6             AST   15             Baso #   0.03       0.06       Basophil %   0.2       0.3       BILIRUBIN TOTAL   0.6  Comment: For infants and newborns, interpretation of results should be based  on gestational age, weight and in agreement with clinical  observations.    Premature  Infant recommended reference ranges:  Up to 24 hours.............<8.0 mg/dL  Up to 48 hours............<12.0 mg/dL  3-5 days..................<15.0 mg/dL  6-29 days.................<15.0 mg/dL               BUN   34             Calcium   8.2             Chloride   107             CO2   24             Creatinine   2.5             Differential Method   Automated       Automated       eGFR   19.8             Eos #   0.1       0.1       Eosinophil %   0.5       0.3       Glucose   125             Gran # (ANC)   14.8       15.8       Gran %   83.0       84.3       Hematocrit   27.0       26.8       Hemoglobin   8.8       8.6       Immature Grans (Abs)   0.40  Comment: Mild elevation in immature granulocytes is non specific and   can be seen in a variety of conditions including stress response,   acute inflammation, trauma and pregnancy. Correlation with other   laboratory and clinical findings is essential.         0.44  Comment: Mild elevation in immature granulocytes is non specific and   can be seen in a variety of conditions including stress response,   acute inflammation, trauma and pregnancy. Correlation with other   laboratory and clinical findings is essential.         Immature Granulocytes   2.2       2.3       Lymph #   1.1       1.1       Lymph %   6.1       6.0       Magnesium    2.1             MCH   29.7       29.7       MCHC   32.6       32.1       MCV   91       92       Mono #   1.4       1.3       Mono %   8.0       6.8       MPV   11.9       11.9       nRBC   0       0       Phosphorus Level   2.5             Platelet Count   181       205       POCT Glucose 169     129   114         Potassium   4.5             PROTEIN TOTAL   4.9             RBC   2.96       2.90       RDW   22.1       21.9       Sodium   137             WBC   17.79       18.74                              Significant Diagnostics:  I have reviewed all pertinent imaging results/findings within the past 24 hours.  Assessment/Plan:      A-V fistula  Lily Green is a 73 y.o. 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 Currently being treated with empyema. ESRD on dialysis.     Fistula feels like there is adequate flow but will check with HD US by vascular lab. Unfortunately, she has anasarca and the fistula does feel a bit deep but will have a formal measurement and recommendation after HD US is performed.     Recommend continue using tunneled line for the time being        Thank you for your consult. I will follow-up with patient. Please contact us if you have any additional questions.    TARIQ SHIPLEY MD  Vascular Surgery  [facility]

## 2024-01-21 NOTE — PROGRESS NOTES
[facility]  Thoracic Surgery  Progress Note    Subjective:     History of Present Illness:  No notes on file    Post-Op Info:  Procedure(s) (LRB):  INSERTION, CARDIAC PACEMAKER, LEADLESS (N/A)   5 Days Post-Op     Interval History:   NAEON. HD held. Hemodynamics remained normal and stable. She was up to the chair a lot yesterday. Ate fairly well.   R CT - 600 cc, L CT - 475 cc        Medications:  Continuous Infusions:      Scheduled Meds:   droNABinol  5 mg Oral BID    epoetin noble (PROCRIT) injection  4,000 Units Intravenous Every Mon, Wed, Fri    insulin detemir U-100  6 Units Subcutaneous Daily    LIDOcaine  1 patch Transdermal Q24H    meropenem (MERREM) IVPB  500 mg Intravenous Q12H    midodrine  5 mg Oral BID WM    oxybutynin  5 mg Oral TID    pantoprazole  40 mg Oral Daily    polyethylene glycol  17 g Oral Daily    senna-docusate 8.6-50 mg  1 tablet Oral Daily     PRN Meds:0.9%  NaCl infusion (for blood administration), sodium chloride 0.9%, acetaminophen, albuterol sulfate, bisacodyL, dextrose 10%, dextrose 10%, glucagon (human recombinant), glucose, glucose, heparin (porcine), hydrALAZINE, insulin aspart U-100, midodrine, ondansetron, sodium chloride 0.9%     Review of patient's allergies indicates:   Allergen Reactions    Crestor [rosuvastatin] Swelling    Gluten protein      Objective:     Vital Signs (Most Recent):  Temp: 97.6 °F (36.4 °C) (01/21/24 0730)  Pulse: 78 (01/21/24 0745)  Resp: 13 (01/21/24 0745)  BP: (!) 92/53 (01/21/24 0730)  SpO2: 99 % (01/21/24 0745) Vital Signs (24h Range):  Temp:  [97.6 °F (36.4 °C)-98.2 °F (36.8 °C)] 97.6 °F (36.4 °C)  Pulse:  [72-86] 78  Resp:  [9-31] 13  SpO2:  [79 %-100 %] 99 %  BP: ()/(50-70) 92/53     Intake/Output - Last 3 Shifts         01/19 0700 01/20 0659 01/20 0700 01/21 0659 01/21 0700 01/22 0659    P.O.  870     I.V. (mL/kg)  2.1 (0)     Blood 542.5      IV Piggyback 1691 194.2     Total Intake(mL/kg) 2233.5 (29.6) 1066.3  (15.6)     Other       Chest Tube 750 1075     Total Output 750 1075     Net +1483.5 -8.7                    SpO2: 99 %       Physical Exam  Vitals and nursing note reviewed.   Constitutional:       Appearance: Normal appearance.   HENT:      Head: Normocephalic and atraumatic.      Nose: Nose normal.   Cardiovascular:      Rate and Rhythm: Normal rate and regular rhythm.      Pulses: Normal pulses.   Pulmonary:      Effort: Pulmonary effort is normal. No respiratory distress.      Comments: Right posterior chest incision c/d/I    Right chest permacath    Right Chest tube, 10 fr, serosanguinous output, waterseal   Left chest tube, 10 fr, serosanguinous output, waterseal   Abdominal:      General: Abdomen is flat. There is no distension.      Palpations: Abdomen is soft.      Tenderness: There is no abdominal tenderness.      Comments: Soft, nontender abdomen   Musculoskeletal:      Right lower leg: Edema present.      Left lower leg: Edema present.   Skin:     General: Skin is warm and dry.      Coloration: Skin is pale.      Comments: Groin soft, dressings c/d/i   Neurological:      General: No focal deficit present.      Mental Status: She is alert.            Significant Labs:  CBC:   Recent Labs   Lab 01/21/24  0236   WBC 17.79*   RBC 2.96*   HGB 8.8*   HCT 27.0*      MCV 91   MCH 29.7   MCHC 32.6         Significant Diagnostics:  I have reviewed and interpreted all pertinent imaging results/findings within the past 24 hours.    VTE Risk Mitigation (From admission, onward)           Ordered     heparin 25,000 units in dextrose 5% 250 mL (100 units/mL) infusion LOW INTENSITY nomogram - [facility]  Continuous        Question:  Begin at (units/kg/hr)  Answer:  16    01/15/24 1038     heparin (porcine) injection 1,000 Units  As needed (PRN)         01/11/24 1351     heparin (porcine) injection 1,000 Units  As needed (PRN)         01/03/24 1123     IP VTE HIGH RISK PATIENT  Once         01/02/24 1226     Place CARMELITA  hose  Until discontinued         01/02/24 1226     Place sequential compression device  Until discontinued         01/02/24 1226                  Assessment/Plan:     * Malignant carcinoid tumor of bronchus  74 yo female with ESRD (TTS), DDD, benign essential tremor, meniere's disease, HTN, HLD, DM2, GERD, Celiac's, IBS and 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 Stepped up to the ICU 1/5 for afib RVR which was refractory to medical management and c/b development of tachy-eulogio syndrome now s/p micra placement 1/16 for tachy-eulogio syndrome in the setting of RVR. Did have brief SBO type picture 1/7 which resolved with conservative management.     - s/p R thoracotomy with R lower lobectomy for carcinoid w/ MLND; IPV injury requiring repair. CT removed Post operatively.  - Refractory afib RVR requiring ICU step up 1/05, s/p chemical cardioversion initially with recurrence c/b developing tachy-euloigo syndrome     - cards and EP following, appreciate assistance     - s/p TVP placement and now micra implantation 1/16    - hold hep gtt 5 days post op (resume tomorrow 1/22)  - CT chest 1/13 w/ bilateral pleural effusion (R>L), pericardial effusion,   - s/p R chest tube placement with IR 1/17, L chest tube placement 1/18; Keep today and may stay to waterseal  - Daily CXR while CT are in place   - Pulmonary toilet: IS, Acapella, Mucomyst daily   - Infectious work up 2/2 leukocytosis. She remains afebrile. Effusions and compressive atelectasis also likely contributing to leukocytosis.Trended up to 30k 1/16, now downtrending. Zosyn 1/11-1/15; Meropenam 1/16 - 1/20; Plan for Ceftriaxone and metronidazole to start 1/21 per ID. ?4 week abx plan without clear cultures, will clarify with ID.     - ID following, appreciate assistance. Reconsult following thora results.   - Bcx 1/11: Staph, probable contaminant  - Bcx1/12: NGTD   - Rapid blood PCR: staph  epi  - Sputum Cx 1/11: klebsiella & e. Coli  - R pleural fluid Cx 1/17: NGTD  - SBO type episode 1/07 requiring NGT decompression, which resolved with conservative management   - Renal, gluten free diet, novasource renal BID; marinol for appetite   - Bowel regimen, PPI  - Nephrology following, appreciate assistance. CRRT 1/17 overnight. HD trial 1/19 passed but requiring midodrine, 1.5 L LR, metoprolol for RVR. Repeat HD per nephro   - LUE AVF created 10.2023, likely mature for use? No US imaging to date in our system. SICU team to touch base with vascular regarding evaluation, potential use.   - Oxybutinin for bladder spasms   - MM pain control as needed  - Continue PT/OT, mobilization, likely post hospital placement         Dispo: remain in ICU for repeat HD trial with midodrine prior to dialysis.         Kat Flores MD  Thoracic Surgery  [facility]

## 2024-01-21 NOTE — PROGRESS NOTES
[facility]  Critical Care - Surgery  Progress Note    Patient Name: Lily Green  MRN: [id number]  Admission Date: 1/2/2024  Hospital Length of Stay: 19 days  Code Status: Full Code  Attending Provider: Tan Thomson MD  Primary Care Provider: Pavel Calixto MD   Principal Problem: Malignant carcinoid tumor of bronchus    Subjective:     Interval History/Significant Events: NAEON. Electrolytes replaced. Remains in NSR and feeling well this morning. In chair most of the day yesterday. Will plan to do the same today.     Follow-up For: Procedure(s) (LRB):  INSERTION, CARDIAC PACEMAKER, LEADLESS (N/A)    Post-Operative Day: 5 Days Post-Op    Objective:     Vital Signs (Most Recent):  Temp: 97.6 °F (36.4 °C) (01/21/24 0730)  Pulse: 84 (01/21/24 1045)  Resp: (!) 24 (01/21/24 1045)  BP: (!) 108/58 (01/21/24 1030)  SpO2: 100 % (01/21/24 1045) Vital Signs (24h Range):  Temp:  [97.6 °F (36.4 °C)-98 °F (36.7 °C)] 97.6 °F (36.4 °C)  Pulse:  [72-85] 84  Resp:  [9-31] 24  SpO2:  [79 %-100 %] 100 %  BP: ()/(50-66) 108/58     Weight: 68.2 kg (150 lb 5.7 oz)  Body mass index is 25.02 kg/m².      Intake/Output Summary (Last 24 hours) at 1/21/2024 1145  Last data filed at 1/21/2024 0800  Gross per 24 hour   Intake 884.75 ml   Output 860 ml   Net 24.75 ml          Physical Exam  Vitals and nursing note reviewed.   Constitutional:       Appearance: Normal appearance.   HENT:      Head: Normocephalic and atraumatic.      Nose: Nose normal.   Cardiovascular:      Rate and Rhythm: Normal rate and regular rhythm.      Pulses: Normal pulses.   Pulmonary:      Effort: Pulmonary effort is normal. No respiratory distress.      Comments: Right posterior chest incision c/d/I    Right chest permacath    Right Chest tube to waterseal   Left chest tube to waterseal   Abdominal:      General: Abdomen is flat. There is no distension.      Palpations: Abdomen is soft.      Tenderness: There is no  abdominal tenderness.      Comments: Soft, nontender abdomen   Musculoskeletal:      Right lower leg: Edema present.      Left lower leg: Edema present.   Skin:     General: Skin is warm and dry.      Coloration: Skin is pale.      Comments: Groin soft, dressings c/d/i   Neurological:      General: No focal deficit present.      Mental Status: She is alert.            Vents:  Oxygen Concentration (%): 24 (01/15/24 0200)    Lines/Drains/Airways       Central Venous Catheter Line  Duration                  Hemodialysis Catheter 01/25/23 1501 right internal jugular 360 days              Drain  Duration                  Chest Tube 01/17/24 1504 Right Pleural 10 Fr. 3 days         Chest Tube 01/18/24 1537 Left Pleural 10 Fr. 2 days              Peripheral Intravenous Line  Duration                  Hemodialysis AV Fistula 09/01/23 0800 Left forearm 142 days         Peripheral IV - Single Lumen 01/15/24 1230 20 G;1 3/4 in Anterior;Proximal;Right Forearm 5 days         Peripheral IV - Single Lumen 01/20/24 0128 20 G Right Antecubital 1 day                    Significant Labs:    CBC/Anemia Profile:  Recent Labs   Lab 01/20/24  0101 01/20/24  1152 01/21/24  0236   WBC 16.54* 18.74* 17.79*   HGB 6.8* 8.6* 8.8*   HCT 21.8* 26.8* 27.0*    205 181   MCV 94 92 91   RDW 22.0* 21.9* 22.1*        Chemistries:  Recent Labs   Lab 01/19/24  1513 01/20/24  0104 01/21/24  0236    134* 137   K 3.4* 4.0 4.5    106 107   CO2 25 23 24   BUN 16 17 34*   CREATININE 1.2 1.4 2.5*   CALCIUM 8.3* 7.7* 8.2*   ALBUMIN 1.9* 1.7* 1.9*   PROT  --  4.5* 4.9*   BILITOT  --  0.5 0.6   ALKPHOS  --  104 97   ALT  --  11 9*   AST  --  17 15   MG 2.1  2.1 2.0 2.1   PHOS 1.4* 1.9* 2.5*       All pertinent labs within the past 24 hours have been reviewed.    Significant Imaging:  I have reviewed all pertinent imaging results/findings within the past 24 hours.  Assessment/Plan:     Cardiac/Vascular  Atrial fibrillation  72 yo female with  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 Stepped up to the unit for persistent afib rvr. Had SBO (now resolved). 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  Pacemaker placed on 01/16. R Chest tube placed w IR on 1/17. L sided chest tube placed w IR on 1/18. Cumulative chest tube output of 3.8L. CXR stable. Pleural cultures NGTD.      Neuro/Psych:   -- Sedation: none  -- Pain: Lidocaine patch   -- has some baseline anxiety, no longer on meds     Cards:   -- BP goals SBP >90, MAPs >55   - Midodrine 5mg BID  -- Afib RVR, 1/19/24 after HD. Given Metoprolol 5mg IV x3    - Now back in NSR   - prn iv metoprolol for sustained RVR > 130's   - If IV Metop fails, start PO metoprolol if HDS stable. If hypotensive start Amio   - AC discontinued in setting of getting PPM  -- Asystolic arrest, now s/p PPM 1/16 rate set to 60 bmp   - 1/12/24 patient asystole with 1 round of compressions and achievement of ROSC. Multiple bradycardic events prior to which had self resolved. STAT TVP placement unsuccessful 2/2 central venous stenosis. 2nd episode of asystole resolved after minimal compressions. 3rd episode of asystole resolved with singular compression.   - S/p TVP via groin in Cath lab 1/12, removed 1/16   - PPM 1/16 @ 60, heparin gtt hold x 5 days (okay to resume AC on 1/21)  - Symptomatic bradycardic events continue to occur, requiring pacing but remains HDS   - high digoxin level now s/p digibind 1/12 and converted to NSR after administration  - dig level now WNL x 2     Pulm:   -- Goal O2 sat > 90%   - satting 100% on RA  -- Surgical chest tube d/c'd 1/8/24  -- CXR with pleural effusions and RLL opacification  -- CT chest (1/13): R worse than L pleural effusion, pericardial effusion   -- IR consulted to place bilateral chest tubes for pleural effusions, 3.8L output total   -- R CT placed 1/17   -- L CT  placed 1/18  -- continue incentive spirometry        Renal:  -- history of ESRD, dialysis dependent   - CRRT per nephrology, Midodrine On Call PRN to be administered prior to HD  -- trend BUN/Cr  -- holding sevelamer in setting of poor PO intake and daily low phos  -- patient is oliguric  -- permacath in place last exchanged 10/2023  -- fistula unable to be used  -- oxybutynin in effort to reduce possible bladder spasm triggering eulogio episodes    Recent Labs   Lab 01/19/24  1513 01/20/24  0104 01/21/24  0236   BUN 16 17 34*   CREATININE 1.2 1.4 2.5*           FEN / GI:   -- CT on 1/8/24 demonstrating SBO, now resolved  -- US on 1/8/24 showing distended gallbladder without signs of acute cholecystitis   - NGT removed.  - Equivocal GGC 1/10  - DC Reglan 1/17  -- PPI  -- Replace lytes as needed  -- Nutrition: diabetic/renal/gluten free soft and bite sized diet with novasource daily  - Aspiration precautions are still of utmost importance  - Appreciate nursing assistance with pt eating  - Dietician consulted for complex dietary restriction  - home appetite stimulant dronabinol 5 mg BID      ID:   -- Tm: afebrile; WBC stable  -- C/f HAP with RLL consolidation  -- MRSA neg, dc vanc  -- zosyn broadened to meropenem giving pleural effusions. Completed Merrem on 1/20/24  -- Start ceftriaxone 2 gm IV daily plus PO metronidazole 500 mg BID on 1/21/24.    -- Continue treatment for 4 weeks  -- blood cultures with 1 bottle growing staph epi, likely a contaminant  -- follow up cultures from pleural fluid, appreciate ID recommendations and assistance    Recent Labs   Lab 01/20/24  0101 01/20/24  1152 01/21/24  0236   WBC 16.54* 18.74* 17.79*           Heme/Onc:  -- Daily CBC  -- heparin gtt for A-fib, hold 5 days after PPM 1/16 (can restart 1/21)  -- Received blood on 1/20/24 for Hb 6.8    Recent Labs   Lab 01/17/24  0316 01/18/24  0348 01/19/24  0318 01/20/24  0101 01/20/24  1152 01/21/24  0236   HGB 7.6* 7.2* 7.2* 6.8* 8.6* 8.8*     169 194 178 205 181   APTT 23.9 22.2 24.8  --   --   --            Endo:   -- history of diabetes  -- Gluc goal 140-180  -- SSI      PPx:   Feeding: diabetic/renal/gluten diet and novasource (restart after procedure)  Analgesia/Sedation:  Pain is controlled, no sedation  Thromboembolic prevention: heparin gtt held  HOB >30: yes  Stress Ulcer ppx: pantoprazole  Glucose control: Critical care goal 140-180 g/dl, SSI  Bowel reg:  having BM  Invasive Lines/Drains/Airway: Permacath, Bilateral chest tubes, PIV x2  Deescalation: none        Dispo/Code Status/Palliative:   -- SICU/ Full Code   -- Patient needs to get out of bed and move, appreciate nursing, and PT and OT assistance/help in this matter            Critical care was time spent personally by me on the following activities: development of treatment plan with patient or surrogate and bedside caregivers, discussions with consultants, evaluation of patient's response to treatment, examination of patient, ordering and performing treatments and interventions, ordering and review of laboratory studies, ordering and review of radiographic studies, pulse oximetry, re-evaluation of patient's condition.  This critical care time did not overlap with that of any other provider or involve time for any procedures.     Prabhu Esqueda MD  Critical Care - Surgery  [facility]

## 2024-01-21 NOTE — CARE UPDATE
-Glucose Goal 140-180    -A1C:   Hemoglobin A1C   Date Value Ref Range Status   11/15/2023 4.9 4.0 - 5.6 % Final     Comment:     ADA Screening Guidelines:  5.7-6.4%  Consistent with prediabetes  >or=6.5%  Consistent with diabetes    High levels of fetal hemoglobin interfere with the HbA1C  assay. Heterozygous hemoglobin variants (HbS, HgC, etc)do  not significantly interfere with this assay.   However, presence of multiple variants may affect accuracy.     10/23/2019 6.8 % Final         -HOME REGIMEN: LDC SSI   Blood sugar 150 to 200, + 1 unit  Blood sugar 201 to 250, + 2 units  Blood sugar 251 to 300, + 3 units  Blood sugar 301 to 350, + 4 units   Blood sugar greater than 350, + 5 units      -GLUCOSE TREND FOR THE PAST 24HRS:   Recent Labs   Lab 01/19/24 2037 01/20/24  0812 01/20/24  1136 01/20/24  1654 01/20/24 2021 01/21/24  0821   POCTGLUCOSE 278* 160* 170* 114* 129* 169*         -NO HYPOGYCEMIAS NOTED     - Diet  Diet diabetic Soft & Bite Sized (IDDSI Level 6), Gluten Free, Renal; 2000 Calorie; Standard Tray    -NPO? no    -Steroids - none     -Tube Feeds - none         Plan:   -Continue Levemir 6 units daily   - Novolog Mod dose correction with ISF 25 starting at 150 prn  - POCT Glucose ac/hs  - Hypoglycemia protocol in place      ** Please notify Endocrine for any change and/or advance in diet**  ** Please call Endocrine for any BG related issues **     Discharge Planning:   TBD. Please notify endocrinology prior to discharge.

## 2024-01-21 NOTE — SUBJECTIVE & OBJECTIVE
Interval History/Significant Events: NAEON. Electrolytes replaced. Remains in NSR and feeling well this morning. In chair most of the day yesterday. Will plan to do the same today.     Follow-up For: Procedure(s) (LRB):  INSERTION, CARDIAC PACEMAKER, LEADLESS (N/A)    Post-Operative Day: 5 Days Post-Op    Objective:     Vital Signs (Most Recent):  Temp: 97.6 °F (36.4 °C) (01/21/24 0730)  Pulse: 84 (01/21/24 1045)  Resp: (!) 24 (01/21/24 1045)  BP: (!) 108/58 (01/21/24 1030)  SpO2: 100 % (01/21/24 1045) Vital Signs (24h Range):  Temp:  [97.6 °F (36.4 °C)-98 °F (36.7 °C)] 97.6 °F (36.4 °C)  Pulse:  [72-85] 84  Resp:  [9-31] 24  SpO2:  [79 %-100 %] 100 %  BP: ()/(50-66) 108/58     Weight: 68.2 kg (150 lb 5.7 oz)  Body mass index is 25.02 kg/m².      Intake/Output Summary (Last 24 hours) at 1/21/2024 1145  Last data filed at 1/21/2024 0800  Gross per 24 hour   Intake 884.75 ml   Output 860 ml   Net 24.75 ml          Physical Exam  Vitals and nursing note reviewed.   Constitutional:       Appearance: Normal appearance.   HENT:      Head: Normocephalic and atraumatic.      Nose: Nose normal.   Cardiovascular:      Rate and Rhythm: Normal rate and regular rhythm.      Pulses: Normal pulses.   Pulmonary:      Effort: Pulmonary effort is normal. No respiratory distress.      Comments: Right posterior chest incision c/d/I    Right chest permacath    Right Chest tube to waterseal   Left chest tube to waterseal   Abdominal:      General: Abdomen is flat. There is no distension.      Palpations: Abdomen is soft.      Tenderness: There is no abdominal tenderness.      Comments: Soft, nontender abdomen   Musculoskeletal:      Right lower leg: Edema present.      Left lower leg: Edema present.   Skin:     General: Skin is warm and dry.      Coloration: Skin is pale.      Comments: Groin soft, dressings c/d/i   Neurological:      General: No focal deficit present.      Mental Status: She is alert.            Vents:  Oxygen  Concentration (%): 24 (01/15/24 0200)    Lines/Drains/Airways       Central Venous Catheter Line  Duration                  Hemodialysis Catheter 01/25/23 1501 right internal jugular 360 days              Drain  Duration                  Chest Tube 01/17/24 1504 Right Pleural 10 Fr. 3 days         Chest Tube 01/18/24 1537 Left Pleural 10 Fr. 2 days              Peripheral Intravenous Line  Duration                  Hemodialysis AV Fistula 09/01/23 0800 Left forearm 142 days         Peripheral IV - Single Lumen 01/15/24 1230 20 G;1 3/4 in Anterior;Proximal;Right Forearm 5 days         Peripheral IV - Single Lumen 01/20/24 0128 20 G Right Antecubital 1 day                    Significant Labs:    CBC/Anemia Profile:  Recent Labs   Lab 01/20/24  0101 01/20/24  1152 01/21/24  0236   WBC 16.54* 18.74* 17.79*   HGB 6.8* 8.6* 8.8*   HCT 21.8* 26.8* 27.0*    205 181   MCV 94 92 91   RDW 22.0* 21.9* 22.1*        Chemistries:  Recent Labs   Lab 01/19/24  1513 01/20/24  0104 01/21/24  0236    134* 137   K 3.4* 4.0 4.5    106 107   CO2 25 23 24   BUN 16 17 34*   CREATININE 1.2 1.4 2.5*   CALCIUM 8.3* 7.7* 8.2*   ALBUMIN 1.9* 1.7* 1.9*   PROT  --  4.5* 4.9*   BILITOT  --  0.5 0.6   ALKPHOS  --  104 97   ALT  --  11 9*   AST  --  17 15   MG 2.1  2.1 2.0 2.1   PHOS 1.4* 1.9* 2.5*       All pertinent labs within the past 24 hours have been reviewed.    Significant Imaging:  I have reviewed all pertinent imaging results/findings within the past 24 hours.

## 2024-01-21 NOTE — ASSESSMENT & PLAN NOTE
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 Stepped up to the unit for persistent afib rvr. Had SBO (now resolved). 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  Pacemaker placed on 01/16. R Chest tube placed w IR on 1/17. L sided chest tube placed w IR on 1/18. Cumulative chest tube output of 3.8L. CXR stable. Pleural cultures NGTD.      Neuro/Psych:   -- Sedation: none  -- Pain: Lidocaine patch   -- has some baseline anxiety, no longer on meds     Cards:   -- BP goals SBP >90, MAPs >55   - Midodrine 5mg BID  -- Afib RVR, 1/19/24 after HD. Given Metoprolol 5mg IV x3    - Now back in NSR   - prn iv metoprolol for sustained RVR > 130's   - If IV Metop fails, start PO metoprolol if HDS stable. If hypotensive start Amio   - AC discontinued in setting of getting PPM  -- Asystolic arrest, now s/p PPM 1/16 rate set to 60 bmp   - 1/12/24 patient asystole with 1 round of compressions and achievement of ROSC. Multiple bradycardic events prior to which had self resolved. STAT TVP placement unsuccessful 2/2 central venous stenosis. 2nd episode of asystole resolved after minimal compressions. 3rd episode of asystole resolved with singular compression.   - S/p TVP via groin in Cath lab 1/12, removed 1/16   - PPM 1/16 @ 60, heparin gtt hold x 5 days (okay to resume AC on 1/21)  - Symptomatic bradycardic events continue to occur, requiring pacing but remains HDS   - high digoxin level now s/p digibind 1/12 and converted to NSR after administration  - dig level now WNL x 2     Pulm:   -- Goal O2 sat > 90%   - satting 100% on RA  -- Surgical chest tube d/c'd 1/8/24  -- CXR with pleural effusions and RLL opacification  -- CT chest (1/13): R worse than L pleural effusion, pericardial effusion   -- IR consulted to place bilateral chest tubes for pleural effusions, 3.8L output total   -- R CT placed  1/17   -- L CT placed 1/18  -- continue incentive spirometry        Renal:  -- history of ESRD, dialysis dependent   - CRRT per nephrology, Midodrine On Call PRN to be administered prior to HD  -- trend BUN/Cr  -- holding sevelamer in setting of poor PO intake and daily low phos  -- patient is oliguric  -- permacath in place last exchanged 10/2023  -- fistula unable to be used  -- oxybutynin in effort to reduce possible bladder spasm triggering eulogio episodes    Recent Labs   Lab 01/19/24  1513 01/20/24  0104 01/21/24  0236   BUN 16 17 34*   CREATININE 1.2 1.4 2.5*           FEN / GI:   -- CT on 1/8/24 demonstrating SBO, now resolved  -- US on 1/8/24 showing distended gallbladder without signs of acute cholecystitis   - NGT removed.  - Equivocal GGC 1/10  - DC Reglan 1/17  -- PPI  -- Replace lytes as needed  -- Nutrition: diabetic/renal/gluten free soft and bite sized diet with novasource daily  - Aspiration precautions are still of utmost importance  - Appreciate nursing assistance with pt eating  - Dietician consulted for complex dietary restriction  - home appetite stimulant dronabinol 5 mg BID      ID:   -- Tm: afebrile; WBC stable  -- C/f HAP with RLL consolidation  -- MRSA neg, dc vanc  -- zosyn broadened to meropenem giving pleural effusions. Completed Merrem on 1/20/24  -- Start ceftriaxone 2 gm IV daily plus PO metronidazole 500 mg BID on 1/21/24.    -- Continue treatment for 4 weeks  -- blood cultures with 1 bottle growing staph epi, likely a contaminant  -- follow up cultures from pleural fluid, appreciate ID recommendations and assistance    Recent Labs   Lab 01/20/24  0101 01/20/24  1152 01/21/24  0236   WBC 16.54* 18.74* 17.79*           Heme/Onc:  -- Daily CBC  -- heparin gtt for A-fib, hold 5 days after PPM 1/16 (can restart 1/21)  -- Received blood on 1/20/24 for Hb 6.8    Recent Labs   Lab 01/17/24  0316 01/18/24  0348 01/19/24  0318 01/20/24  0101 01/20/24  1152 01/21/24  0236   HGB 7.6* 7.2* 7.2*  6.8* 8.6* 8.8*    169 194 178 205 181   APTT 23.9 22.2 24.8  --   --   --            Endo:   -- history of diabetes  -- Gluc goal 140-180  -- SSI      PPx:   Feeding: diabetic/renal/gluten diet and novasource (restart after procedure)  Analgesia/Sedation:  Pain is controlled, no sedation  Thromboembolic prevention: heparin gtt held  HOB >30: yes  Stress Ulcer ppx: pantoprazole  Glucose control: Critical care goal 140-180 g/dl, SSI  Bowel reg:  having BM  Invasive Lines/Drains/Airway: Permacath, Bilateral chest tubes, PIV x2  Deescalation: none        Dispo/Code Status/Palliative:   -- SICU/ Full Code   -- Patient needs to get out of bed and move, appreciate nursing, and PT and OT assistance/help in this matter

## 2024-01-22 PROBLEM — D3A.090 CARCINOID TUMOR OF LUNG: Status: ACTIVE | Noted: 2024-01-22

## 2024-01-22 LAB
ALBUMIN SERPL BCP-MCNC: 1.8 G/DL (ref 3.5–5.2)
ALP SERPL-CCNC: 95 U/L (ref 55–135)
ALT SERPL W/O P-5'-P-CCNC: 7 U/L (ref 10–44)
ANION GAP SERPL CALC-SCNC: 9 MMOL/L (ref 8–16)
APTT PPP: 22.6 SEC (ref 21–32)
APTT PPP: 22.9 SEC (ref 21–32)
APTT PPP: 49.1 SEC (ref 21–32)
AST SERPL-CCNC: 13 U/L (ref 10–40)
BACTERIA FLD CULT: NORMAL
BASOPHILS # BLD AUTO: 0.04 K/UL (ref 0–0.2)
BASOPHILS NFR BLD: 0.3 % (ref 0–1.9)
BILIRUB SERPL-MCNC: 0.5 MG/DL (ref 0.1–1)
BUN SERPL-MCNC: 47 MG/DL (ref 8–23)
CALCIUM SERPL-MCNC: 8.4 MG/DL (ref 8.7–10.5)
CHLORIDE SERPL-SCNC: 107 MMOL/L (ref 95–110)
CO2 SERPL-SCNC: 20 MMOL/L (ref 23–29)
CREAT SERPL-MCNC: 3.7 MG/DL (ref 0.5–1.4)
DIFFERENTIAL METHOD BLD: ABNORMAL
EOSINOPHIL # BLD AUTO: 0.1 K/UL (ref 0–0.5)
EOSINOPHIL NFR BLD: 0.7 % (ref 0–8)
ERYTHROCYTE [DISTWIDTH] IN BLOOD BY AUTOMATED COUNT: 21.5 % (ref 11.5–14.5)
EST. GFR  (NO RACE VARIABLE): 12.4 ML/MIN/1.73 M^2
FINAL PATHOLOGIC DIAGNOSIS: NORMAL
GLUCOSE SERPL-MCNC: 141 MG/DL (ref 70–110)
HCT VFR BLD AUTO: 27.1 % (ref 37–48.5)
HGB BLD-MCNC: 8.5 G/DL (ref 12–16)
IMM GRANULOCYTES # BLD AUTO: 0.27 K/UL (ref 0–0.04)
IMM GRANULOCYTES NFR BLD AUTO: 1.8 % (ref 0–0.5)
INR PPP: 1.1 (ref 0.8–1.2)
LYMPHOCYTES # BLD AUTO: 0.9 K/UL (ref 1–4.8)
LYMPHOCYTES NFR BLD: 5.9 % (ref 18–48)
Lab: NORMAL
MAGNESIUM SERPL-MCNC: 2.2 MG/DL (ref 1.6–2.6)
MCH RBC QN AUTO: 29.6 PG (ref 27–31)
MCHC RBC AUTO-ENTMCNC: 31.4 G/DL (ref 32–36)
MCV RBC AUTO: 94 FL (ref 82–98)
MONOCYTES # BLD AUTO: 1.2 K/UL (ref 0.3–1)
MONOCYTES NFR BLD: 8.2 % (ref 4–15)
NEUTROPHILS # BLD AUTO: 12.7 K/UL (ref 1.8–7.7)
NEUTROPHILS NFR BLD: 83.1 % (ref 38–73)
NRBC BLD-RTO: 0 /100 WBC
PHOSPHATE SERPL-MCNC: 2.6 MG/DL (ref 2.7–4.5)
PLATELET # BLD AUTO: 209 K/UL (ref 150–450)
PMV BLD AUTO: 11.2 FL (ref 9.2–12.9)
POCT GLUCOSE: 124 MG/DL (ref 70–110)
POCT GLUCOSE: 165 MG/DL (ref 70–110)
POCT GLUCOSE: 177 MG/DL (ref 70–110)
POTASSIUM SERPL-SCNC: 4.5 MMOL/L (ref 3.5–5.1)
PROT SERPL-MCNC: 5.1 G/DL (ref 6–8.4)
PROTHROMBIN TIME: 11.3 SEC (ref 9–12.5)
RBC # BLD AUTO: 2.87 M/UL (ref 4–5.4)
SODIUM SERPL-SCNC: 136 MMOL/L (ref 136–145)
WBC # BLD AUTO: 15.2 K/UL (ref 3.9–12.7)

## 2024-01-22 PROCEDURE — 99291 CRITICAL CARE FIRST HOUR: CPT | Mod: ,,, | Performed by: ANESTHESIOLOGY

## 2024-01-22 PROCEDURE — 25000003 PHARM REV CODE 250

## 2024-01-22 PROCEDURE — 83735 ASSAY OF MAGNESIUM: CPT

## 2024-01-22 PROCEDURE — 99900035 HC TECH TIME PER 15 MIN (STAT)

## 2024-01-22 PROCEDURE — 25000003 PHARM REV CODE 250: Performed by: SURGERY

## 2024-01-22 PROCEDURE — 84100 ASSAY OF PHOSPHORUS: CPT

## 2024-01-22 PROCEDURE — 63600175 PHARM REV CODE 636 W HCPCS

## 2024-01-22 PROCEDURE — 51798 US URINE CAPACITY MEASURE: CPT

## 2024-01-22 PROCEDURE — 80053 COMPREHEN METABOLIC PANEL: CPT | Performed by: STUDENT IN AN ORGANIZED HEALTH CARE EDUCATION/TRAINING PROGRAM

## 2024-01-22 PROCEDURE — 63600175 PHARM REV CODE 636 W HCPCS: Mod: JZ | Performed by: NURSE PRACTITIONER

## 2024-01-22 PROCEDURE — 11000001 HC ACUTE MED/SURG PRIVATE ROOM

## 2024-01-22 PROCEDURE — 85025 COMPLETE CBC W/AUTO DIFF WBC: CPT | Performed by: STUDENT IN AN ORGANIZED HEALTH CARE EDUCATION/TRAINING PROGRAM

## 2024-01-22 PROCEDURE — 97535 SELF CARE MNGMENT TRAINING: CPT

## 2024-01-22 PROCEDURE — 99232 SBSQ HOSP IP/OBS MODERATE 35: CPT | Mod: FS,,, | Performed by: INTERNAL MEDICINE

## 2024-01-22 PROCEDURE — 76937 US GUIDE VASCULAR ACCESS: CPT

## 2024-01-22 PROCEDURE — 85730 THROMBOPLASTIN TIME PARTIAL: CPT | Mod: 91

## 2024-01-22 PROCEDURE — 97530 THERAPEUTIC ACTIVITIES: CPT

## 2024-01-22 PROCEDURE — 85610 PROTHROMBIN TIME: CPT

## 2024-01-22 PROCEDURE — 94799 UNLISTED PULMONARY SVC/PX: CPT | Mod: XB

## 2024-01-22 PROCEDURE — A4216 STERILE WATER/SALINE, 10 ML: HCPCS | Performed by: STUDENT IN AN ORGANIZED HEALTH CARE EDUCATION/TRAINING PROGRAM

## 2024-01-22 PROCEDURE — C1751 CATH, INF, PER/CENT/MIDLINE: HCPCS

## 2024-01-22 PROCEDURE — 97112 NEUROMUSCULAR REEDUCATION: CPT

## 2024-01-22 PROCEDURE — 94761 N-INVAS EAR/PLS OXIMETRY MLT: CPT

## 2024-01-22 PROCEDURE — 80100014 HC HEMODIALYSIS 1:1

## 2024-01-22 PROCEDURE — 85730 THROMBOPLASTIN TIME PARTIAL: CPT | Mod: 91 | Performed by: STUDENT IN AN ORGANIZED HEALTH CARE EDUCATION/TRAINING PROGRAM

## 2024-01-22 PROCEDURE — 25000003 PHARM REV CODE 250: Performed by: STUDENT IN AN ORGANIZED HEALTH CARE EDUCATION/TRAINING PROGRAM

## 2024-01-22 PROCEDURE — 36410 VNPNXR 3YR/> PHY/QHP DX/THER: CPT

## 2024-01-22 PROCEDURE — 93990 DOPPLER FLOW TESTING: CPT | Performed by: SURGERY

## 2024-01-22 RX ORDER — HEPARIN SODIUM,PORCINE/D5W 25000/250
0-40 INTRAVENOUS SOLUTION INTRAVENOUS CONTINUOUS
Status: DISCONTINUED | OUTPATIENT
Start: 2024-01-22 | End: 2024-01-23

## 2024-01-22 RX ORDER — CEFPODOXIME PROXETIL 200 MG/1
200 TABLET, FILM COATED ORAL NIGHTLY
Status: DISCONTINUED | OUTPATIENT
Start: 2024-01-22 | End: 2024-02-06 | Stop reason: HOSPADM

## 2024-01-22 RX ORDER — SODIUM CHLORIDE 0.9 % (FLUSH) 0.9 %
10 SYRINGE (ML) INJECTION
Status: DISCONTINUED | OUTPATIENT
Start: 2024-01-22 | End: 2024-02-06 | Stop reason: HOSPADM

## 2024-01-22 RX ORDER — METOPROLOL TARTRATE 25 MG/1
12.5 TABLET ORAL 2 TIMES DAILY
Status: DISCONTINUED | OUTPATIENT
Start: 2024-01-22 | End: 2024-02-06 | Stop reason: HOSPADM

## 2024-01-22 RX ORDER — VENLAFAXINE 37.5 MG/1
37.5 TABLET ORAL DAILY
Status: DISCONTINUED | OUTPATIENT
Start: 2024-01-22 | End: 2024-02-06 | Stop reason: HOSPADM

## 2024-01-22 RX ORDER — SODIUM CHLORIDE 0.9 % (FLUSH) 0.9 %
10 SYRINGE (ML) INJECTION EVERY 6 HOURS
Status: DISCONTINUED | OUTPATIENT
Start: 2024-01-22 | End: 2024-02-06 | Stop reason: HOSPADM

## 2024-01-22 RX ORDER — MIDODRINE HYDROCHLORIDE 5 MG/1
15 TABLET ORAL
Status: DISCONTINUED | OUTPATIENT
Start: 2024-01-22 | End: 2024-02-06 | Stop reason: HOSPADM

## 2024-01-22 RX ADMIN — Medication 10 ML: at 06:01

## 2024-01-22 RX ADMIN — POLYETHYLENE GLYCOL 3350 17 G: 17 POWDER, FOR SOLUTION ORAL at 08:01

## 2024-01-22 RX ADMIN — DRONABINOL 5 MG: 2.5 CAPSULE ORAL at 09:01

## 2024-01-22 RX ADMIN — METRONIDAZOLE 500 MG: 500 TABLET ORAL at 08:01

## 2024-01-22 RX ADMIN — CEFPODOXIME PROXETIL 200 MG: 200 TABLET, FILM COATED ORAL at 08:01

## 2024-01-22 RX ADMIN — MIDODRINE HYDROCHLORIDE 15 MG: 5 TABLET ORAL at 08:01

## 2024-01-22 RX ADMIN — OXYBUTYNIN CHLORIDE 5 MG: 5 TABLET ORAL at 08:01

## 2024-01-22 RX ADMIN — VENLAFAXINE 37.5 MG: 37.5 TABLET ORAL at 10:01

## 2024-01-22 RX ADMIN — OXYBUTYNIN CHLORIDE 5 MG: 5 TABLET ORAL at 04:01

## 2024-01-22 RX ADMIN — MIDODRINE HYDROCHLORIDE 5 MG: 5 TABLET ORAL at 08:01

## 2024-01-22 RX ADMIN — INSULIN ASPART 2 UNITS: 100 INJECTION, SOLUTION INTRAVENOUS; SUBCUTANEOUS at 12:01

## 2024-01-22 RX ADMIN — DRONABINOL 5 MG: 2.5 CAPSULE ORAL at 08:01

## 2024-01-22 RX ADMIN — LIDOCAINE 5% 1 PATCH: 700 PATCH TOPICAL at 10:01

## 2024-01-22 RX ADMIN — SENNOSIDES AND DOCUSATE SODIUM 1 TABLET: 8.6; 5 TABLET ORAL at 08:01

## 2024-01-22 RX ADMIN — INSULIN ASPART 2 UNITS: 100 INJECTION, SOLUTION INTRAVENOUS; SUBCUTANEOUS at 08:01

## 2024-01-22 RX ADMIN — HEPARIN SODIUM AND DEXTROSE 12 UNITS/KG/HR: 10000; 5 INJECTION INTRAVENOUS at 09:01

## 2024-01-22 RX ADMIN — ERYTHROPOIETIN 4000 UNITS: 4000 INJECTION, SOLUTION INTRAVENOUS; SUBCUTANEOUS at 06:01

## 2024-01-22 RX ADMIN — MIDODRINE HYDROCHLORIDE 5 MG: 5 TABLET ORAL at 04:01

## 2024-01-22 RX ADMIN — METOPROLOL TARTRATE 12.5 MG: 25 TABLET, FILM COATED ORAL at 10:01

## 2024-01-22 RX ADMIN — PANTOPRAZOLE SODIUM 40 MG: 40 TABLET, DELAYED RELEASE ORAL at 08:01

## 2024-01-22 NOTE — PLAN OF CARE
SICU PLAN OF CARE NOTE    Dx: Malignant carcinoid tumor of bronchus    Goals of Care: MAP > 55, SBP > 90, < 180     Vital Signs (last 12 hours):   Temp:  [97.6 °F (36.4 °C)-97.8 °F (36.6 °C)]   Pulse:  [77-91]   Resp:  [10-43]   BP: ()/(51-66)   SpO2:  [95 %-100 %]      Neuro: AAO x4, Follows Commands, and Moves All Extremities      Cardiac: NSR     Respiratory: Room Air     Gtts: None     Urine Output: Anuric     Dialysis: intermittent HD     Drains:   R. Pleural Chest Tube, total output 300 cc /  shift  L. Pleural Chest Tube, total output 290 cc / shift     Diet: Diabetic, renal, gluten free, soft/bite sized bites     Labs/Accuchecks: Daily Labs ; Accuchecks ACHS     Skin:  No signs of skin breakdown or redness noted over bony prominences. Sacral and heel foam dressings in place. Patient repositioned Q2H and as needed. Immerse bed plugged in and working correctly     Shift Events:    No acute events throughout the shift. Patient up out of bed and in the chair for most of the shift.   Enema given per orders and bowel movement x 2.    services provided today.     Plan of care reviewed with the patient and her family and all questions answered at this time.        UROLOGY Office Visit Note      5/25/2021    UROLOGY CHIEF COMPLAINT  Chief Complaint   Patient presents with   • Office Visit   • Kidney Problem   • Procedure     cystoscopy       UROLOGY HISTORY OF PRESENT ILLNESS    Idalmis Hopson is a 59 year old female who presents in follow-up for history of renal cell carcinoma.  She had cystic ovarian masses and 2 separate left renal masses.  She underwent right salpingo oophorectomy with mass resection revealing Cystadenofibroma and open left partial nephrectomy x2 with left renal cystectomy x2 on 05/10/2021.  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  She had bilateral internal stents placed at the time of surgery.  She is recovering well.    She presents for stent removal today.  Cystoscopy was performed using aseptic technique.  The bladder appears normal.  There is a stent coming from each ureter.  Each stent was grasped and removed without difficulty.    HISTORIES  Past Medical History:   Diagnosis Date   • Deflation of breast implant 04/26/2020   • Malignant neoplasm (CMS/HCC)     Ductal carcinoma   • Strain of lumbar region 12/06/2018       Past Surgical History:   Procedure Laterality Date   • Bilateral salpingoophorectomy  05/10/2021   • Breast surgery      Right mastectomy   • Tissue expander         No family history on file.    Social History     Occupational History   • Not on file   Tobacco Use   • Smoking status: Never Smoker   • Smokeless tobacco: Never Used   Substance and Sexual Activity   • Alcohol use: Never   • Drug use: Never   • Sexual activity: Not on file       MEDICATIONS    Current Outpatient Medications   Medication Sig   • losartan-hydrochlorothiazide (HYZAAR) 50-12.5 MG per tablet Take 1 tablet by mouth daily.     No current facility-administered medications for this visit.       ALLERGIES    ALLERGIES:  No Known Allergies    PHYSICAL  EXAMINATION    Vitals signs:  Blood pressure 115/68, pulse 92, temperature 98.8 °F (37.1 °C), temperature source Oral, resp. rate 18, weight 51.1 kg, SpO2 100 %.  General:  Alert and oriented. No acute distress.  Psych:   Pleasant and appropriate affect.    RESULTS  Creatinine (mg/dL)   Date Value   05/13/2021 0.86     No results found for: GFRNA  No results found for: GFRA    ASSESSMENT/PLAN  Idalmis is doing well following bilateral salpingo oophorectomy with mass resection and open left partial nephrectomy x2 and renal cystectomy x2.  She has low risk renal cell carcinoma and will be followed by the low risk protocol.  She will have renal ultrasound in 1 month rule out hydronephrosis and 6 month follow-up for CT chest abdomen with labs.

## 2024-01-22 NOTE — PROGRESS NOTES
[facility]  Adult Nutrition  Progress Note    SUMMARY       Recommendations    1. Rec'd Renal, gluten free diet with texture per SLP       - texture advance per SLP/MD    2. Rec'd continuing ONS Novasource renal BID      3. RD to monitor and follow      Goals: Meet % EEN, EPN by RD f/u  Nutrition Goal Status: progressing towards goal  Communication of RD Recs:  (POC)    Assessment and Plan    Nutrition Problem  Inadequate oral intake     Related to (etiology):   Decreased ability to consume sufficient energy     Signs and Symptoms (as evidenced by):   Restrictive diet       Interventions/Recommendations (treatment strategy):  Collaboration of nutrition care with other providers     Nutrition Diagnosis Status:   Continue    Reason for Assessment    Reason For Assessment: RD follow-up  Diagnosis:  (malignant carcinoid tumor of bronchus)  Relevant Medical History: T2DM, ESRD  Interdisciplinary Rounds: did not attend    General Information Comments: Pt was seen briefly for RD f/u. Pt resting comfortably, with no noted n/v/d/c. Per pt's RN, pt drinks Novasource Renal as ordered and also consumes protein shakes provided by family. Pt does not consume a lot of the facility food, but caregiver will bring food from home that is appropriate for their dietary restrictions. Pt started HD obn Friday, had hypotensive episode afterwards. MD's are hopeful for trial HD later tonight. RD to continue to monitor and f/u.    Nutrition Discharge Planning: Renal diet    Nutrition Risk Screen    Nutrition Risk Screen: no indicators present    Nutrition/Diet History    Patient Reported Diet/Restrictions/Preferences: gluten-free, diabetic diet, renal (Low potassium)  Food Preferences: No potatoes, grits, high potassium foods. Drink smoothies, eats strawberries, low sodium chicken broth, cabbage, carrots OK; Broiled fish/chicken  Spiritual, Cultural Beliefs, Congregational Practices, Values that Affect Care: no  Food  "Allergies: wheat (gluten)  Factors Affecting Nutritional Intake: decreased appetite    Anthropometrics    Temp: 98.2 °F (36.8 °C)  Height Method: Stated  Height: 5' 5" (165.1 cm)  Height (inches): 65 in  Weight Method: Bed Scale  Weight: 68.2 kg (150 lb 5.7 oz)  Weight (lb): 150.36 lb  Ideal Body Weight (IBW), Female: 125 lb  % Ideal Body Weight, Female (lb): 121.6 %  BMI (Calculated): 25    Lab/Procedures/Meds    Pertinent Labs Reviewed: reviewed  Pertinent Labs Comments: BUN: 47, cr: 3.7, GFR: 12.4, gluc: 141, Ca: 8.4, PRO: 5.1, alb: 1.8    Pertinent Medications Reviewed: reviewed  Pertinent Medications Comments: dronabinol, insulin, abx, pantoprazole, polyethylene glycol, senna-docusate, heparin    Estimated/Assessed Needs    Weight Used For Calorie Calculations: 57 kg (125 lb 10.6 oz) (d/t edema present)  Energy Calorie Requirements (kcal): 1710- 1995 kcal  Energy Need Method: Kcal/kg (30-35 kcal/kg of IBW)    Protein Requirements: 86-g (1.5g/kg of IBW)  Weight Used For Protein Calculations: 57 kg (125 lb 10.6 oz) (IBW d/t edema present)    RDA Method (mL): 1710    CHO Requirement: 214- 249 g    Nutrition Prescription Ordered    Current Diet Order: Soft and Bite-sized, renal  Nutrition Order Comments: Gluten Free  Oral Nutrition Supplement: NSR BID    Evaluation of Received Nutrient/Fluid Intake    I/O: -3.5L since admit  Energy Calories Required: not meeting needs  Protein Required: not meeting needs  Fluid Required:  (as per MD)  Comments: LBM 1/21  Tolerance: tolerating  % Intake of Estimated Energy Needs: 25 - 50 %  % Meal Intake: 25 - 50 %    Nutrition Risk    Level of Risk/Frequency of Follow-up:  (1x/week)     Monitor and Evaluation    Food and Nutrient Intake: energy intake, food and beverage intake  Food and Nutrient Adminstration: diet order  Knowledge/Beliefs/Attitudes: food and nutrition knowledge/skill, beliefs and attitudes  Physical Activity and Function: nutrition-related ADLs and " IADLs  Anthropometric Measurements: height/length, weight, body mass index, weight change  Biochemical Data, Medical Tests and Procedures: electrolyte and renal panel, gastrointestinal profile, glucose/endocrine profile, inflammatory profile, lipid profile  Nutrition-Focused Physical Findings: overall appearance     Nutrition Follow-Up    RD Follow-up?: Yes

## 2024-01-22 NOTE — SUBJECTIVE & OBJECTIVE
Interval History/Significant Events: NAEON. Patient remained in NSR overnight and hemodynamically stable. Pleural Cx NGTD. CT's with 330 cc output from R and 300 cc from L. Last HD on Friday. Will get HD today and if well tolerated, she can step down today.    Follow-up For: Procedure(s) (LRB):  INSERTION, CARDIAC PACEMAKER, LEADLESS (N/A)    Post-Operative Day: 6 Days Post-Op    Objective:     Vital Signs (Most Recent):  Temp: 97.8 °F (36.6 °C) (01/22/24 0300)  Pulse: 81 (01/22/24 0515)  Resp: 16 (01/22/24 0515)  BP: (!) 105/55 (01/22/24 0500)  SpO2: 100 % (01/22/24 0515) Vital Signs (24h Range):  Temp:  [97.5 °F (36.4 °C)-97.9 °F (36.6 °C)] 97.8 °F (36.6 °C)  Pulse:  [76-91] 81  Resp:  [10-43] 16  SpO2:  [95 %-100 %] 100 %  BP: ()/(51-67) 105/55     Weight: 68.2 kg (150 lb 5.7 oz)  Body mass index is 25.02 kg/m².      Intake/Output Summary (Last 24 hours) at 1/22/2024 0738  Last data filed at 1/21/2024 2300  Gross per 24 hour   Intake 414.53 ml   Output 630 ml   Net -215.47 ml          Physical Exam  Vitals and nursing note reviewed.   Constitutional:       General: She is not in acute distress.     Appearance: Normal appearance. She is normal weight. She is not ill-appearing, toxic-appearing or diaphoretic.   HENT:      Head: Normocephalic and atraumatic.      Right Ear: External ear normal.      Left Ear: External ear normal.      Nose: Nose normal.   Eyes:      General: No scleral icterus.        Right eye: No discharge.         Left eye: No discharge.      Extraocular Movements: Extraocular movements intact.   Cardiovascular:      Rate and Rhythm: Normal rate and regular rhythm.   Pulmonary:      Effort: Pulmonary effort is normal. No respiratory distress.      Comments: Right posterior chest incision c/d/I    Right chest permacath    Right Chest tube to waterseal   Left chest tube to waterseal   Abdominal:      General: Abdomen is flat. There is no distension.      Palpations: Abdomen is soft. There is  no mass.      Tenderness: There is no abdominal tenderness. There is no guarding.      Hernia: No hernia is present.      Comments: Soft, nontender abdomen   Musculoskeletal:         General: Normal range of motion.      Right upper arm: Edema present.      Left upper arm: Edema present.      Right forearm: Edema present.      Left forearm: Edema present.      Cervical back: Normal range of motion and neck supple.      Right lower leg: Edema present.      Left lower leg: Edema present.   Skin:     General: Skin is warm and dry.      Coloration: Skin is pale.      Comments: Groin soft, dressings c/d/i   Neurological:      General: No focal deficit present.      Mental Status: She is alert and oriented to person, place, and time. Mental status is at baseline.   Psychiatric:         Mood and Affect: Mood normal.         Behavior: Behavior normal.         Thought Content: Thought content normal.            Vents:  Oxygen Concentration (%): 24 (01/15/24 0200)    Lines/Drains/Airways       Central Venous Catheter Line  Duration                  Hemodialysis Catheter 01/25/23 1501 right internal jugular 361 days              Drain  Duration                  Chest Tube 01/17/24 1504 Right Pleural 10 Fr. 4 days         Chest Tube 01/18/24 1537 Left Pleural 10 Fr. 3 days              Peripheral Intravenous Line  Duration                  Hemodialysis AV Fistula 09/01/23 0800 Left forearm 143 days         Peripheral IV - Single Lumen 01/15/24 1230 20 G;1 3/4 in Anterior;Proximal;Right Forearm 6 days         Peripheral IV - Single Lumen 01/20/24 0128 20 G Right Antecubital 2 days                    Significant Labs:    CBC/Anemia Profile:  Recent Labs   Lab 01/20/24  1152 01/21/24  0236 01/22/24  0242   WBC 18.74* 17.79* 15.20*   HGB 8.6* 8.8* 8.5*   HCT 26.8* 27.0* 27.1*    181 209   MCV 92 91 94   RDW 21.9* 22.1* 21.5*        Chemistries:  Recent Labs   Lab 01/21/24  0236 01/22/24  0242    136   K 4.5 4.5   CL  107 107   CO2 24 20*   BUN 34* 47*   CREATININE 2.5* 3.7*   CALCIUM 8.2* 8.4*   ALBUMIN 1.9* 1.8*   PROT 4.9* 5.1*   BILITOT 0.6 0.5   ALKPHOS 97 95   ALT 9* 7*   AST 15 13   MG 2.1  --    PHOS 2.5*  --        All pertinent labs within the past 24 hours have been reviewed.    Significant Imaging:  I have reviewed all pertinent imaging results/findings within the past 24 hours.

## 2024-01-22 NOTE — CONSULTS
CHRISTIANOS consulted for Midline insertion in real time using u/s guidance.     Indication: LONG TERM PVA  Gauge: 18  Location: RIGHT BASILIC  Length in cm: 10  Max dwell date: 2/20/2024  Lot #: [id number]    Image saved and uploaded to EMR

## 2024-01-22 NOTE — PROGRESS NOTES
[facility]  Nephrology  Progress Note    Patient Name: Lily Green  MRN: [id number]  Admission Date: 1/2/2024  Hospital Length of Stay: 20 days  Attending Provider: Tan Thomson MD   Primary Care Physician: Pavel Calixto MD  Principal Problem:Malignant carcinoid tumor of bronchus    Subjective:     HPI: Patient is a 73 y.o. 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 S/p thoracotomy. Last HD prior to presentation 1/1/24. Nephrology consulted for ESRD on HD.        Interval History:   NAEON, electrolytes non-emergent.  Due for HD today.    Review of patient's allergies indicates:   Allergen Reactions    Crestor [rosuvastatin] Swelling    Gluten protein      Current Facility-Administered Medications   Medication Frequency    0.9%  NaCl infusion PRN    acetaminophen tablet 650 mg Q4H PRN    albuterol sulfate nebulizer solution 2.5 mg Q4H PRN    bisacodyL suppository 10 mg Daily PRN    cefpodoxime tablet 200 mg QHS    dextrose 10% bolus 125 mL 125 mL PRN    dextrose 10% bolus 250 mL 250 mL PRN    droNABinol capsule 5 mg BID    epoetin noble injection 4,000 Units Every Mon, Wed, Fri    glucagon (human recombinant) injection 1 mg PRN    glucose chewable tablet 16 g PRN    glucose chewable tablet 24 g PRN    heparin (porcine) injection 1,000 Units PRN    heparin 25,000 units in dextrose 5% (100 units/ml) IV bolus from bag - ADDITIONAL PRN BOLUS - 30 units/kg PRN    heparin 25,000 units in dextrose 5% (100 units/ml) IV bolus from bag - ADDITIONAL PRN BOLUS - 60 units/kg PRN    heparin 25,000 units in dextrose 5% 250 mL (100 units/mL) infusion LOW INTENSITY nomogram - [facility] Continuous    hydrALAZINE injection 10 mg Q6H PRN    insulin aspart U-100 pen 0-10 Units QID (AC + HS) PRN    insulin detemir U-100 (Levemir) pen 6 Units Daily    LIDOcaine 5 % patch 1 patch Q24H     metoprolol tartrate (LOPRESSOR) split tablet 12.5 mg BID    metroNIDAZOLE tablet 500 mg Q12H    midodrine tablet 15 mg PRN    midodrine tablet 5 mg BID WM    ondansetron injection 8 mg Q8H PRN    oxybutynin tablet 5 mg TID    pantoprazole EC tablet 40 mg Daily    polyethylene glycol packet 17 g Daily    senna-docusate 8.6-50 mg per tablet 1 tablet Daily    sodium chloride 0.9% flush 10 mL PRN    venlafaxine tablet 37.5 mg Daily       Objective:     Vital Signs (Most Recent):  Temp: 97.4 °F (36.3 °C) (01/22/24 1115)  Pulse: 86 (01/22/24 1345)  Resp: 20 (01/22/24 1345)  BP: 101/71 (01/22/24 1330)  SpO2: 100 % (01/22/24 1345) Vital Signs (24h Range):  Temp:  [97.4 °F (36.3 °C)-97.9 °F (36.6 °C)] 97.4 °F (36.3 °C)  Pulse:  [76-96] 86  Resp:  [10-44] 20  SpO2:  [95 %-100 %] 100 %  BP: ()/(51-71) 101/71     Weight: 68.2 kg (150 lb 5.7 oz) (01/21/24 0500)  Body mass index is 25.02 kg/m².  Body surface area is 1.77 meters squared.    I/O last 3 completed shifts:  In: 510.1 [P.O.:300; I.V.:14.8; IV Piggyback:195.3]  Out: 730 [Chest Tube:730]     Physical Exam  Vitals and nursing note reviewed.   Constitutional:       Appearance: She is well-developed. She is ill-appearing.   HENT:      Head: Normocephalic and atraumatic.      Right Ear: External ear normal.      Left Ear: External ear normal.   Eyes:      General: No scleral icterus.        Right eye: No discharge.         Left eye: No discharge.      Conjunctiva/sclera: Conjunctivae normal.   Cardiovascular:      Rate and Rhythm: Normal rate and regular rhythm.      Heart sounds: Normal heart sounds. No murmur heard.     No friction rub. No gallop.   Pulmonary:      Effort: Pulmonary effort is normal. No respiratory distress.      Breath sounds: No stridor. Examination of the right-lower field reveals decreased breath sounds. Decreased breath sounds present. No wheezing or rales.   Abdominal:      General: Bowel sounds are normal.   Musculoskeletal:          General: No tenderness.      Right lower leg: Edema present.      Left lower leg: Edema present.   Skin:     General: Skin is warm and dry.   Neurological:      Mental Status: She is alert and oriented to person, place, and time.          Significant Labs:  CBC:   Recent Labs   Lab 01/22/24  0242   WBC 15.20*   RBC 2.87*   HGB 8.5*   HCT 27.1*      MCV 94   MCH 29.6   MCHC 31.4*     CMP:   Recent Labs   Lab 01/22/24  0242   *   CALCIUM 8.4*   ALBUMIN 1.8*   PROT 5.1*      K 4.5   CO2 20*      BUN 47*   CREATININE 3.7*   ALKPHOS 95   ALT 7*   AST 13   BILITOT 0.5        Assessment/Plan:     Cardiac/Vascular  Atrial fibrillation  -management per primary     Renal/  ESRD (end stage renal disease)  S/p R thoracotomy due to RML carcinoid tumor. On HD ~ 1 year. Last HD prior to presentation 1/1/24. Pt has NELY AVF placed on 10/23 that has not been cleared by vascular. Currently using RIJ TDC.     During her admission: stepped up to ICU for Afib with RVR. Was started on BB, amio and digoxin. However 1/12/24, had multiple episodes of eulogio with few asystole requiring chest compressions. Digoxin level elevated and dig-meaghan was given with resulting decrease in levels.     Nephrology History  iHD Schedule: TTS   Unit/MD: [facility]/ Dr. Vays  Duration: 3.5 hours   EDW: 58 kg   Access: RIJ TDC  Residual Renal Function: minimal       Volume status:  Hypervolemic, negative 200 ml/24h.    Assessment:     -plan for HD today.   ml-1L  -midodrine to be given prior to HD  -Keep MAP > 65  -Keep hemoglobin > 7  -Strict ins and outs  -Avoid nephrotoxic agents if possible and renally dose medications  -Avoid drastic hemodynamic changes if possible        Neto Pitts NP  Nephrology  [facility]

## 2024-01-22 NOTE — OP NOTE
[facility]  Cardiothoracic Surgery Operative Report    Patient Name:  Lily Green; [id number]    Diagnosis: Bleeding during lobectomy    Date of Operation:  1/2/2024     Operation:  Primary repair of right inferior pulmonary vein    Surgeon:  Silver Ardon MD    ---------------------------------------------------------------------------------------------------------------------      Indications for surgery: Bleeding was noted during a lobectomy and I was called to provide assistance.    Gross findings: Right inferior pulmonary vein with 0.5cm x 0.5cm injury.      Procedure:  During a planned lobectomy, bleeding was noted while the hilum dissection was occurring.  I was called to provide assistance.  Dr. Thomson and his team performed the thoracotomy and the bleeding was controlled.  A 0.5cm x 0.5cm injury was noted in the right inferior pulmonary vein.  With a 3-0 prolene suture, the injury was repaired primarily.  The remainder of the case was performed by Dr. Thomson as planned.

## 2024-01-22 NOTE — CARE UPDATE
-Glucose Goal 140-180    -A1C:   Hemoglobin A1C   Date Value Ref Range Status   11/15/2023 4.9 4.0 - 5.6 % Final     Comment:     ADA Screening Guidelines:  5.7-6.4%  Consistent with prediabetes  >or=6.5%  Consistent with diabetes    High levels of fetal hemoglobin interfere with the HbA1C  assay. Heterozygous hemoglobin variants (HbS, HgC, etc)do  not significantly interfere with this assay.   However, presence of multiple variants may affect accuracy.     10/23/2019 6.8 % Final         -HOME REGIMEN: LDC SSI   Blood sugar 150 to 200, + 1 unit  Blood sugar 201 to 250, + 2 units  Blood sugar 251 to 300, + 3 units  Blood sugar 301 to 350, + 4 units   Blood sugar greater than 350, + 5 units      -GLUCOSE TREND FOR THE PAST 24HRS:   Recent Labs   Lab 01/20/24  1654 01/20/24  2021 01/21/24  0821 01/21/24  1150 01/21/24  1641 01/22/24  0810   POCTGLUCOSE 114* 129* 169* 183* 138* 165*         -NO HYPOGYCEMIAS NOTED     - Diet  Diet diabetic Soft & Bite Sized (IDDSI Level 6), Gluten Free, Renal; 2000 Calorie; Standard Tray    -NPO? no    -Steroids - none     -Tube Feeds - none         Plan:   -Continue Levemir 6 units daily   - Novolog Mod dose correction with ISF 25 starting at 150 prn  - POCT Glucose ac/hs  - Hypoglycemia protocol in place      ** Please notify Endocrine for any change and/or advance in diet**  ** Please call Endocrine for any BG related issues **     Discharge Planning:   TBD. Please notify endocrinology prior to discharge.

## 2024-01-22 NOTE — OPERATIVE NOTE ADDENDUM
Certification of Assistant at Surgery       Surgery Date: 1/16/2024     Participating Surgeons:  Surgeon(s) and Role:     * LENIN Frances MD - Primary    Procedures:  Procedure(s) (LRB):  INSERTION, CARDIAC PACEMAKER, LEADLESS (N/A)    Assistant Surgeon's Certification of Necessity:  I understand that section 1842 (b) (6) (d) of the Social Security Act generally prohibits Medicare Part B reasonable charge payment for the services of assistants at surgery in teaching hospitals when qualified residents are available to furnish such services. I certify that the services for which payment is claimed were medically necessary, and that no qualified resident was available to perform the services. I further understand that these services are subject to post-payment review by the Medicare carrier.      Silver Ardon MD    01/22/2024  2:01 PM

## 2024-01-22 NOTE — SUBJECTIVE & OBJECTIVE
Interval History:   NAEON, electrolytes non-emergent.  Due for HD today.    Review of patient's allergies indicates:   Allergen Reactions    Crestor [rosuvastatin] Swelling    Gluten protein      Current Facility-Administered Medications   Medication Frequency    0.9%  NaCl infusion PRN    acetaminophen tablet 650 mg Q4H PRN    albuterol sulfate nebulizer solution 2.5 mg Q4H PRN    bisacodyL suppository 10 mg Daily PRN    cefpodoxime tablet 200 mg QHS    dextrose 10% bolus 125 mL 125 mL PRN    dextrose 10% bolus 250 mL 250 mL PRN    droNABinol capsule 5 mg BID    epoetin noble injection 4,000 Units Every Mon, Wed, Fri    glucagon (human recombinant) injection 1 mg PRN    glucose chewable tablet 16 g PRN    glucose chewable tablet 24 g PRN    heparin (porcine) injection 1,000 Units PRN    heparin 25,000 units in dextrose 5% (100 units/ml) IV bolus from bag - ADDITIONAL PRN BOLUS - 30 units/kg PRN    heparin 25,000 units in dextrose 5% (100 units/ml) IV bolus from bag - ADDITIONAL PRN BOLUS - 60 units/kg PRN    heparin 25,000 units in dextrose 5% 250 mL (100 units/mL) infusion LOW INTENSITY nomogram - [facility] Continuous    hydrALAZINE injection 10 mg Q6H PRN    insulin aspart U-100 pen 0-10 Units QID (AC + HS) PRN    insulin detemir U-100 (Levemir) pen 6 Units Daily    LIDOcaine 5 % patch 1 patch Q24H    metoprolol tartrate (LOPRESSOR) split tablet 12.5 mg BID    metroNIDAZOLE tablet 500 mg Q12H    midodrine tablet 15 mg PRN    midodrine tablet 5 mg BID WM    ondansetron injection 8 mg Q8H PRN    oxybutynin tablet 5 mg TID    pantoprazole EC tablet 40 mg Daily    polyethylene glycol packet 17 g Daily    senna-docusate 8.6-50 mg per tablet 1 tablet Daily    sodium chloride 0.9% flush 10 mL PRN    venlafaxine tablet 37.5 mg Daily       Objective:     Vital Signs (Most Recent):  Temp: 97.4 °F (36.3 °C) (01/22/24 1115)  Pulse: 86 (01/22/24 1345)  Resp: 20 (01/22/24 1345)  BP: 101/71 (01/22/24 1330)  SpO2: 100 % (01/22/24 1345)  Vital Signs (24h Range):  Temp:  [97.4 °F (36.3 °C)-97.9 °F (36.6 °C)] 97.4 °F (36.3 °C)  Pulse:  [76-96] 86  Resp:  [10-44] 20  SpO2:  [95 %-100 %] 100 %  BP: ()/(51-71) 101/71     Weight: 68.2 kg (150 lb 5.7 oz) (01/21/24 0500)  Body mass index is 25.02 kg/m².  Body surface area is 1.77 meters squared.    I/O last 3 completed shifts:  In: 510.1 [P.O.:300; I.V.:14.8; IV Piggyback:195.3]  Out: 730 [Chest Tube:730]     Physical Exam  Vitals and nursing note reviewed.   Constitutional:       Appearance: She is well-developed. She is ill-appearing.   HENT:      Head: Normocephalic and atraumatic.      Right Ear: External ear normal.      Left Ear: External ear normal.   Eyes:      General: No scleral icterus.        Right eye: No discharge.         Left eye: No discharge.      Conjunctiva/sclera: Conjunctivae normal.   Cardiovascular:      Rate and Rhythm: Normal rate and regular rhythm.      Heart sounds: Normal heart sounds. No murmur heard.     No friction rub. No gallop.   Pulmonary:      Effort: Pulmonary effort is normal. No respiratory distress.      Breath sounds: No stridor. Examination of the right-lower field reveals decreased breath sounds. Decreased breath sounds present. No wheezing or rales.   Abdominal:      General: Bowel sounds are normal.   Musculoskeletal:         General: No tenderness.      Right lower leg: Edema present.      Left lower leg: Edema present.   Skin:     General: Skin is warm and dry.   Neurological:      Mental Status: She is alert and oriented to person, place, and time.          Significant Labs:  CBC:   Recent Labs   Lab 01/22/24  0242   WBC 15.20*   RBC 2.87*   HGB 8.5*   HCT 27.1*      MCV 94   MCH 29.6   MCHC 31.4*     CMP:   Recent Labs   Lab 01/22/24  0242   *   CALCIUM 8.4*   ALBUMIN 1.8*   PROT 5.1*      K 4.5   CO2 20*      BUN 47*   CREATININE 3.7*   ALKPHOS 95   ALT 7*   AST 13   BILITOT 0.5

## 2024-01-22 NOTE — PROGRESS NOTES
[facility]  Critical Care - Surgery  Progress Note    Patient Name: Lily Green  MRN: [id number]  Admission Date: 1/2/2024  Hospital Length of Stay: 20 days  Code Status: Full Code  Attending Provider: Tan Thomson MD  Primary Care Provider: Pavel Calixto MD   Principal Problem: Malignant carcinoid tumor of bronchus    Subjective:     Hospital/ICU Course:  No notes on file    Interval History/Significant Events: NAEON. Patient remained in NSR overnight and hemodynamically stable. Pleural Cx NGTD. CT's with 330 cc output from R and 300 cc from L. Last HD on Friday. Will get HD today and if well tolerated, she can step down today.    Follow-up For: Procedure(s) (LRB):  INSERTION, CARDIAC PACEMAKER, LEADLESS (N/A)    Post-Operative Day: 6 Days Post-Op    Objective:     Vital Signs (Most Recent):  Temp: 97.8 °F (36.6 °C) (01/22/24 0300)  Pulse: 81 (01/22/24 0515)  Resp: 16 (01/22/24 0515)  BP: (!) 105/55 (01/22/24 0500)  SpO2: 100 % (01/22/24 0515) Vital Signs (24h Range):  Temp:  [97.5 °F (36.4 °C)-97.9 °F (36.6 °C)] 97.8 °F (36.6 °C)  Pulse:  [76-91] 81  Resp:  [10-43] 16  SpO2:  [95 %-100 %] 100 %  BP: ()/(51-67) 105/55     Weight: 68.2 kg (150 lb 5.7 oz)  Body mass index is 25.02 kg/m².      Intake/Output Summary (Last 24 hours) at 1/22/2024 0738  Last data filed at 1/21/2024 2300  Gross per 24 hour   Intake 414.53 ml   Output 630 ml   Net -215.47 ml          Physical Exam  Vitals and nursing note reviewed.   Constitutional:       General: She is not in acute distress.     Appearance: Normal appearance. She is normal weight. She is not ill-appearing, toxic-appearing or diaphoretic.   HENT:      Head: Normocephalic and atraumatic.      Right Ear: External ear normal.      Left Ear: External ear normal.      Nose: Nose normal.   Eyes:      General: No scleral icterus.        Right eye: No discharge.         Left eye: No discharge.      Extraocular Movements:  Extraocular movements intact.   Cardiovascular:      Rate and Rhythm: Normal rate and regular rhythm.   Pulmonary:      Effort: Pulmonary effort is normal. No respiratory distress.      Comments: Right posterior chest incision c/d/I    Right chest permacath    Right Chest tube to waterseal   Left chest tube to waterseal   Abdominal:      General: Abdomen is flat. There is no distension.      Palpations: Abdomen is soft. There is no mass.      Tenderness: There is no abdominal tenderness. There is no guarding.      Hernia: No hernia is present.      Comments: Soft, nontender abdomen   Musculoskeletal:         General: Normal range of motion.      Right upper arm: Edema present.      Left upper arm: Edema present.      Right forearm: Edema present.      Left forearm: Edema present.      Cervical back: Normal range of motion and neck supple.      Right lower leg: Edema present.      Left lower leg: Edema present.   Skin:     General: Skin is warm and dry.      Coloration: Skin is pale.      Comments: Groin soft, dressings c/d/i   Neurological:      General: No focal deficit present.      Mental Status: She is alert and oriented to person, place, and time. Mental status is at baseline.   Psychiatric:         Mood and Affect: Mood normal.         Behavior: Behavior normal.         Thought Content: Thought content normal.            Vents:  Oxygen Concentration (%): 24 (01/15/24 0200)    Lines/Drains/Airways       Central Venous Catheter Line  Duration                  Hemodialysis Catheter 01/25/23 1501 right internal jugular 361 days              Drain  Duration                  Chest Tube 01/17/24 1504 Right Pleural 10 Fr. 4 days         Chest Tube 01/18/24 1537 Left Pleural 10 Fr. 3 days              Peripheral Intravenous Line  Duration                  Hemodialysis AV Fistula 09/01/23 0800 Left forearm 143 days         Peripheral IV - Single Lumen 01/15/24 1230 20 G;1 3/4 in Anterior;Proximal;Right Forearm 6 days          Peripheral IV - Single Lumen 01/20/24 0128 20 G Right Antecubital 2 days                    Significant Labs:    CBC/Anemia Profile:  Recent Labs   Lab 01/20/24  1152 01/21/24  0236 01/22/24  0242   WBC 18.74* 17.79* 15.20*   HGB 8.6* 8.8* 8.5*   HCT 26.8* 27.0* 27.1*    181 209   MCV 92 91 94   RDW 21.9* 22.1* 21.5*        Chemistries:  Recent Labs   Lab 01/21/24  0236 01/22/24  0242    136   K 4.5 4.5    107   CO2 24 20*   BUN 34* 47*   CREATININE 2.5* 3.7*   CALCIUM 8.2* 8.4*   ALBUMIN 1.9* 1.8*   PROT 4.9* 5.1*   BILITOT 0.6 0.5   ALKPHOS 97 95   ALT 9* 7*   AST 15 13   MG 2.1  --    PHOS 2.5*  --        All pertinent labs within the past 24 hours have been reviewed.    Significant Imaging:  I have reviewed all pertinent imaging results/findings within the past 24 hours.  Assessment/Plan:     Cardiac/Vascular  Atrial fibrillation  74 yo female with 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 Stepped up to the unit for persistent afib rvr. Had SBO (now resolved). 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  Pacemaker placed on 01/16. R Chest tube placed w IR on 1/17. L sided chest tube placed w IR on 1/18. CXR stable. Pleural cultures NGTD.      Neuro/Psych:   -- Sedation: none  -- Pain: Lidocaine patch   -- has some baseline anxiety, no longer on meds     Cards:   -- BP goals SBP >90, MAPs >55   - Midodrine 5mg BID   - Midodrine 10mg additional with HD  -- Afib RVR, 1/19/24 after HD. Given Metoprolol 5mg IV x3    - Now back in NSR   - prn iv metoprolol for sustained RVR > 130's   - If IV Metop fails, start PO metoprolol if HDS stable. If hypotensive start Amio   - heparin gtt restarted 1/22, had been held for 5 days after PPM placement  -- Asystolic arrest, now s/p PPM 1/16 rate set to 60 bmp   - 1/12/24 patient asystole with 1 round of compressions and achievement of  ROSC. Multiple bradycardic events prior to which had self resolved. STAT TVP placement unsuccessful 2/2 central venous stenosis. 2nd episode of asystole resolved after minimal compressions. 3rd episode of asystole resolved with singular compression.   - S/p TVP via groin in Cath lab 1/12, removed 1/16   - PPM 1/16 @ 60, heparin gtt held x 5 days, resumed 1/22  - Symptomatic bradycardic events continue to occur, requiring pacing but remains HDS   - high digoxin level now s/p digibind 1/12 and converted to NSR after administration  - dig level now WNL x 2     Pulm:   -- Goal O2 sat > 90%   - satting 100% on RA  -- Surgical chest tube d/c'd 1/8/24  -- CXR with pleural effusions and RLL opacification  -- CT chest (1/13): R worse than L pleural effusion, pericardial effusion   -- IR consulted to place bilateral chest tubes for pleural effusions   -- R CT placed 1/17   -- L CT placed 1/18  -- continue incentive spirometry      Renal:  -- history of ESRD, dialysis dependent   - CRRT per nephrology, additional midodrine 10mg On Call PRN to be administered prior to HD  -- trend BUN/Cr  -- holding sevelamer in setting of poor PO intake and low daily phos  -- patient is anuric vs oliguric  -- permacath in place last exchanged 10/2023  -- US of AVF pending, will f/u  -- oxybutynin to reduce bladder spasm possibly triggering eulogio episodes    Recent Labs   Lab 01/20/24  0104 01/21/24  0236 01/22/24  0242   BUN 17 34* 47*   CREATININE 1.4 2.5* 3.7*           FEN / GI:   -- CT on 1/8/24 demonstrating SBO, now resolved  -- US on 1/8/24 showing distended gallbladder without signs of acute cholecystitis   - NGT removed  - Equivocal GGC 1/10  -- PPI  -- Replace lytes as needed  -- Nutrition: diabetic/renal/gluten free soft and bite sized diet with novasource daily  - Dietician consulted for complex dietary restriction  - home appetite stimulant dronabinol 5 mg BID  -- Bowel regiment: miralax and doc-senna      ID:   -- Tm: afebrile;  WBC down trending  -- C/f HAP with RLL consolidation, ID following  -- MRSA neg, dc vanc  -- zosyn broadened to meropenem given pleural effusions, completed Merrem on 1/20/24  -- Start ceftriaxone 2 gm IV daily plus PO metronidazole 500 mg BID on 1/21/24  -- can transition ceftriaxone to cefpodoxime (renally dosed)   -- Continue treatment for 4 weeks (end date: 2/15/24)  -- blood cultures with 1 bottle growing staph epi, likely a contaminant  -- pleural cultures (1/18): NGTD    Recent Labs   Lab 01/20/24  1152 01/21/24  0236 01/22/24  0242   WBC 18.74* 17.79* 15.20*           Heme/Onc:  -- Daily CBC  -- heparin gtt for A-fib, held 5 days after PPM 1/16, restarted 1/22  -- S/p 1 unit pRBC's on 1/20/24 for Hb 6.8    Recent Labs   Lab 01/17/24  0316 01/18/24  0348 01/19/24  0318 01/20/24  0101 01/20/24  1152 01/21/24  0236 01/22/24  0242   HGB 7.6* 7.2* 7.2*   < > 8.6* 8.8* 8.5*    169 194   < > 205 181 209   APTT 23.9 22.2 24.8  --   --   --   --     < > = values in this interval not displayed.           Endo:   -- history of diabetes  -- Gluc goal 140-180  -- detemir 6u QD  -- SSI      PPx:   Feeding: diabetic/renal/gluten diet and novasource  Analgesia/Sedation: Pain is controlled, no sedation  Thromboembolic prevention: heparin gtt  HOB >30: yes  Stress Ulcer ppx: pantoprazole  Glucose control: Critical care goal 140-180 g/dl, detemir and SSI  Bowel reg: miralax and doc-senna  Invasive Lines/Drains/Airway: Permacath, Bilateral chest tubes, PIV x2  Deescalation: SD if she tolerates HD today        Dispo/Code Status/Palliative:   -- Full Code  -- SD if she tolerated HD today   -- Patient needs to get out of bed and move, appreciate nursing, and PT and OT assistance/help in this matter         Gissel Jack MD  Critical Care - Surgery  [facility]

## 2024-01-22 NOTE — PT/OT/SLP PROGRESS
Physical Therapy   Progress Note    Patient Name:  Lily Green  MRN: [id number]    Admit Date: 1/2/2024  Admitting Diagnosis:  Malignant carcinoid tumor of bronchus  Length of Stay: 20 days  Recent Surgery: Procedure(s) (LRB):  INSERTION, CARDIAC PACEMAKER, LEADLESS (N/A) 6 Days Post-Op    Recommendations:     Discharge Recommendations: moderate therapy intensity  Equipment recommendations: wheelchair  Barriers to discharge: Increased level of skilled assistance required and Fall risk     Assessment:     Lily Green is a 73 y.o. female admitted to [facility] on 1/2/2024 with medical diagnosis of Malignant carcinoid tumor of bronchus. Pt presents with weakness, impaired endurance, impaired self care skills, impaired functional mobility, impaired balance, decreased coordination, impaired coordination. Pt is progressing towards goals, but has not yet reached prior level of function.     Pt agreeable to therapy session; up in recliner with caregiver at bedside. Pt required increase time and motivation to participate with therapy today, endorsing she felt anxious in regards to mobility. Pt able to stand x3 trials with significant assistance; achieved most upright stand (~80%) on final attempt. No steps taken d/t poor standing tolerance. Encouraged pt to perform LAQ and AP while in recliner. All VSS during session.    Lily Green would benefit from continued acute PT intervention to improve quality of life, focus on recovery of impairments, provide patient/caregiver education, reduce fall risk, and maximize (I) and safety with functional mobility. Once medically stable, recommending pt discharge to moderate therapy intensity.      Rehab Prognosis: Fair    Plan:     During this hospitalization, patient to be seen 4 x/week to address the identified rehab impairments via gait training, therapeutic activities, therapeutic exercises, neuromuscular re-education and progress towards stated goals.      Plan of Care Expires:  02/08/24  Plan of Care reviewed with: patient; caregiver    This plan of care has been discussed with the patient/caregiver, who was included in its development and is in agreement with the identified goals and treatment plan.     Subjective     Communicated with RN prior to session.  Patient found up in chair upon PT entry to room, agreeable to therapy session. Pt's caregiver present during session.    Patient/Family Comments/goals: none stated    Pain/Comfort:  Pain Rating 1: 0/10  Pain Rating Post-Intervention 1: 0/10    Patients cultural, spiritual, Congregation conflicts given the current situation: None identified     Objective:   OT present for cotreat due to pt's multiple medical comorbidities and functional/cognition deficits requiring two skilled therapists to appropriately progress pt's musculoskeletal strength, neuromuscular control, and endurance while taking into consideration medical acuity and pt safety.    Patient found with: wound vac, telemetry, pulse ox (continuous), blood pressure cuff, chest tube    General Precautions: Standard, fall   Orthopedic Precautions:N/A   Braces:     Oxygen Device: room air    Cognition:  Pt is Alert during session.    Therapist provided skilled verbal and tactile cueing to facilitate the following functional mobility tasks. Listed tasks are focused on recovery of impairments and improving pt's independence and efficiency with bed mobility, transfers and ambulation as able.     Bed Mobility:  Not assessed d/t in recliner    Transfers:   Sit <> Stand Transfer: Moderate Assistancex2 from recliner with no AD x3 trials  Trial 1 & 2: increased forward flexion with no gluteal activation to assist with upright posture  Trial 3: ~80% upright achieved    Balance:  Dynamic Sitting: FAIR+: Maintains balance through MINIMAL excursions of active trunk motion  Standing:  Static: 0: Needs MAXIMAL assist to maintain    Dynamic: 0: N/A    Outcome Measure:  AM-PAC 6 CLICK MOBILITY  Total Score:10     Patient/Caregiver Education and Additional Therapeutic Activities/Exercises   BLE exercises: x10, 3-4x/day  LAQ  Ankle pumps    Provided pt/caregiver education regarding:   PT POC and goals for therapy   Safety with mobility and fall risk   Safe management of AD as needed   Energy conservation techniques   Instruction on use of call button and importance of calling nursing staff for assistance with mobility     Patient/caregiver able to verbalize understanding; will follow-up with pt/caregiver during current admit for additional questions/concerns within scope of practice.     White board updated.     Patient left up in chair with all lines intact, call button in reach, nsg notified, and caregiver present.    Goals:     Multidisciplinary Problems       Physical Therapy Goals          Problem: Physical Therapy    Goal Priority Disciplines Outcome Goal Variances Interventions   Physical Therapy Goal     PT, PT/OT Ongoing, Progressing     Description: PT goals until 2/9/24    1. Pt supine to sit with supervision-not met  2. Pt sit to supine with supervision-not met  3. Pt sit to stand with AD if needed with supervision-not met  4. Pt to perform gait 150 ft with AD if needed with supervision.-not met  5. Pt to go up/down curb step with AD if needed with CGA.-not met                         Time Tracking:       PT Received On: 01/22/24  PT Start Time: 0827     PT Stop Time: 0850  PT Total Time (min): 23 min     Billable Minutes: Therapeutic Activity 15 and Neuromuscular Re-education 8    01/22/2024

## 2024-01-22 NOTE — PT/OT/SLP PROGRESS
"Occupational Therapy   CoTreatment    Name: Lily Green  MRN: [id number]  Admitting Diagnosis:  Malignant carcinoid tumor of bronchus  6 Days Post-Op    Recommendations:     Discharge Recommendations: Moderate Intensity Therapy    Assessment:     Lily Green is a 73 y.o. female with a medical diagnosis of Malignant carcinoid tumor of bronchus.   Performance deficits affecting function are weakness, impaired endurance. Pt tolerated session fair. VSS throughout session, but pt with increased anxiety with activity limited participation/performance.   Increased POC this date to reflect current performance and needs.     Rehab Prognosis:  Fair; patient would benefit from acute skilled OT services to address these deficits and reach maximum level of function.       Plan:     Patient to be seen 4 x/week to address the above listed problems via self-care/home management, neuromuscular re-education, cognitive retraining, therapeutic activities, therapeutic exercises  Plan of Care Expires: 02/02/24  Plan of Care Reviewed with: patient, caregiver    Subjective     Pt states, "I'm too weak" several times during session.   Pain/Comfort:  Pain Rating 1: 0/10    Objective:     Communicated with: nsg prior to session.  Patient found in chair with tele, pulse ox, BP cuff, CT x 2. Sitter in room.   Cotx completed this date to optimize functional performance and safety given impaired tolerance for activity in setting of ICU     General Precautions: Standard, fall      Occupational Performance:     Functional Mobility/Transfers:   3 standing trials with MOD A x 2 each trial.   Pt able to stand approx 30 seconds and demo improved upright standing on last trial.     Activities of Daily Living:  Pt able to complete face washing and hand washing in sitting with set-up and MIN A to manage hair due to limited shoulder ROM. Unable to complete standing self care due to impaired standing balance/endurance.   LE/UE " dressing; TOTAL A       [facility] 6 Click ADL: 10    Treatment & Education:  Pt required increased time for all transitions due to reports of anxiety. Cues and encouragement provided to participate with therapy.   Pt completed UE AROM HEP with education provided re: purpose, technique and frequency to perform to increase functional ROM/strength. Visual reminder place on board and sitter and pt verb/demo understanding     Patient left up in chair with all lines intact, call button in reach, nsg notified, and sitter  present    GOALS:   Multidisciplinary Problems       Occupational Therapy Goals          Problem: Occupational Therapy    Goal Priority Disciplines Outcome Interventions   Occupational Therapy Goal     OT, PT/OT Ongoing, Progressing    Description: Goals to be met by: 02/2/24     Patient will increase functional independence with ADLs by performing:    UE Dressing with Supervision.  Grooming while standing at sink with Modified [place].  Toileting from toilet with Modified [place] for hygiene and clothing management.   Supine to sit with Supervision.  Toilet transfer to toilet with Supervision.                         Time Tracking:     OT Date of Treatment: 01/22/24  OT Start Time: 0827  OT Stop Time: 0850  OT Total Time (min): 23 min    Billable Minutes:Self Care/Home Management 8  Therapeutic Activity 15    OT/RICHARD: OT          1/22/2024

## 2024-01-22 NOTE — ASSESSMENT & PLAN NOTE
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 On HD ~ 1 year. Last HD prior to presentation 1/1/24. Pt has NELY AVF placed on 10/23 that has not been cleared by vascular. Currently using RIJ TDC.     During her admission: stepped up to ICU for Afib with RVR. Was started on BB, amio and digoxin. However 1/12/24, had multiple episodes of eulogio with few asystole requiring chest compressions. Digoxin level elevated and dig-meaghan was given with resulting decrease in levels.     Nephrology History  iHD Schedule: TTS   Unit/MD: [facility]/ Dr. Vyas  Duration: 3.5 hours   EDW: 58 kg   Access: RIJ TDC  Residual Renal Function: minimal       Volume status:  Hypervolemic, negative 200 ml/24h.    Assessment:     -plan for HD today.   ml-1L  -midodrine to be given prior to HD  -Keep MAP > 65  -Keep hemoglobin > 7  -Strict ins and outs  -Avoid nephrotoxic agents if possible and renally dose medications  -Avoid drastic hemodynamic changes if possible

## 2024-01-22 NOTE — ASSESSMENT & PLAN NOTE
#Bradycardia    73yoF with ESRD admitted for bronchial tumor removal with post op course complicated by atrial fibrillation. She had difficult to control rates but further post-operatively, her rates improved so was weaned off rate controlling agents. Overnight 1/11-1/12 and morning of 1/12 she had several bradycardic episodes in the setting of atrial fibrillation, recent digoxin use (reportedly renally dosed, but some high levels with ESRD).    In the setting of asystolic events with several short rounds of compressions (no more than 1 round at a time), TVP was attempted. 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 She is post PTA and stenting of the left innominate vein. This was done at an outside hospital. Micra implantation was decided on due to infection risk and difficult venous anatomy.    EF 60-65% during this admission  Jan 13, 2024 EKG: Sinus rhythm, QRS 76    Recommendations:  - Micra in placed 1/16  - S/p chest tube 1/17, contralateral chest tube 1/18  - Okay to leave at VVI (backup rate) 60  - On discharge would have her follow up with EP in 6 weeks  - EP to sign off. We appreciate your consultation

## 2024-01-22 NOTE — PLAN OF CARE
01/22/24 1613   Post-Acute Status   Post-Acute Authorization Placement   Post-Acute Placement Status Patient List Provided     The SW met with the patient at bedside to follow-up regarding the request of her MD to discuss d/c placement. The SW discussed with the patient options upon d/c. The SW  presented the patient with a list of Skilled providers.   The patient informed the SW that she was not sure if she wanted to go into a Skilled facility. The patient stated that she was going to think about it.  The SW left the list of Skilled providers at bedside.       Alberto Fitch LMSW  Case Management [facility]

## 2024-01-22 NOTE — PLAN OF CARE
Recommendations     1. Rec'd Renal, gluten free diet with texture per SLP                   - texture advance per SLP/MD     2. Rec'd continuing ONS Novasource renal BID       3. RD to monitor and follow        Goals: Meet % EEN, EPN by RD f/u  Nutrition Goal Status: progressing towards goal  Communication of RD Recs:  (POC)

## 2024-01-22 NOTE — SUBJECTIVE & OBJECTIVE
Interval History: NAEON. Had episode of rate controlled AF overnight that converted. No further tele events. Chest tubes in place.     Review of Systems   Constitutional: Negative for diaphoresis and fever.   Cardiovascular:  Negative for chest pain, dyspnea on exertion, leg swelling, near-syncope, orthopnea, palpitations, paroxysmal nocturnal dyspnea and syncope.   Respiratory:  Negative for cough and shortness of breath.    Gastrointestinal:  Negative for abdominal pain, diarrhea, nausea and vomiting.   Neurological:  Negative for light-headedness.   Psychiatric/Behavioral:  Negative for altered mental status and substance abuse.      Objective:     Vital Signs (Most Recent):  Temp: 97.8 °F (36.6 °C) (01/21/24 1600)  Pulse: 80 (01/21/24 2200)  Resp: 13 (01/21/24 2200)  BP: (!) 108/58 (01/21/24 2200)  SpO2: 100 % (01/21/24 2200) Vital Signs (24h Range):  Temp:  [97.6 °F (36.4 °C)-97.8 °F (36.6 °C)] 97.8 °F (36.6 °C)  Pulse:  [72-91] 80  Resp:  [10-43] 13  SpO2:  [94 %-100 %] 100 %  BP: ()/(51-66) 108/58     Weight: 68.2 kg (150 lb 5.7 oz)  Body mass index is 25.02 kg/m².     SpO2: 100 %        Physical Exam  Constitutional:       Appearance: Normal appearance.   Cardiovascular:      Rate and Rhythm: Normal rate and regular rhythm.      Pulses: Normal pulses.      Heart sounds: Normal heart sounds.   Pulmonary:      Effort: Pulmonary effort is normal.      Breath sounds: Normal breath sounds. No wheezing or rales.      Comments: Chest tubes in place  Abdominal:      General: Abdomen is flat. There is no distension.      Palpations: Abdomen is soft.      Tenderness: There is no abdominal tenderness.   Musculoskeletal:      Right lower leg: No edema.      Left lower leg: No edema.   Skin:     General: Skin is warm and dry.   Neurological:      Mental Status: She is alert and oriented to person, place, and time. Mental status is at baseline.   Psychiatric:         Mood and Affect: Mood normal.         Behavior:  Behavior normal.            Significant Labs: BMP:   Recent Labs   Lab 01/20/24  0104 01/21/24  0236   * 125*   * 137   K 4.0 4.5    107   CO2 23 24   BUN 17 34*   CREATININE 1.4 2.5*   CALCIUM 7.7* 8.2*   MG 2.0 2.1     , CMP:   Recent Labs   Lab 01/20/24  0104 01/21/24  0236   * 137   K 4.0 4.5    107   CO2 23 24   * 125*   BUN 17 34*   CREATININE 1.4 2.5*   CALCIUM 7.7* 8.2*   PROT 4.5* 4.9*   ALBUMIN 1.7* 1.9*   BILITOT 0.5 0.6   ALKPHOS 104 97   AST 17 15   ALT 11 9*   ANIONGAP 5* 6*     , and CBC:   Recent Labs   Lab 01/20/24  0101 01/20/24  1152 01/21/24  0236   WBC 16.54* 18.74* 17.79*   HGB 6.8* 8.6* 8.8*   HCT 21.8* 26.8* 27.0*    205 181

## 2024-01-22 NOTE — ASSESSMENT & PLAN NOTE
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 Stepped up to the unit for persistent afib rvr. Had SBO (now resolved). 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  Pacemaker placed on 01/16. R Chest tube placed w IR on 1/17. L sided chest tube placed w IR on 1/18. CXR stable. Pleural cultures NGTD.      Neuro/Psych:   -- Sedation: none  -- Pain: Lidocaine patch   -- has some baseline anxiety, no longer on meds     Cards:   -- BP goals SBP >90, MAPs >55   - Midodrine 5mg BID   - Midodrine 10mg additional with HD  -- Afib RVR, 1/19/24 after HD. Given Metoprolol 5mg IV x3    - Now back in NSR   - prn iv metoprolol for sustained RVR > 130's   - If IV Metop fails, start PO metoprolol if HDS stable. If hypotensive start Amio   - heparin gtt restarted 1/22, had been held for 5 days after PPM placement  -- Asystolic arrest, now s/p PPM 1/16 rate set to 60 bmp   - 1/12/24 patient asystole with 1 round of compressions and achievement of ROSC. Multiple bradycardic events prior to which had self resolved. STAT TVP placement unsuccessful 2/2 central venous stenosis. 2nd episode of asystole resolved after minimal compressions. 3rd episode of asystole resolved with singular compression.   - S/p TVP via groin in Cath lab 1/12, removed 1/16   - PPM 1/16 @ 60, heparin gtt held x 5 days, resumed 1/22  - Symptomatic bradycardic events continue to occur, requiring pacing but remains HDS   - high digoxin level now s/p digibind 1/12 and converted to NSR after administration  - dig level now WNL x 2     Pulm:   -- Goal O2 sat > 90%   - satting 100% on RA  -- Surgical chest tube d/c'd 1/8/24  -- CXR with pleural effusions and RLL opacification  -- CT chest (1/13): R worse than L pleural effusion, pericardial effusion   -- IR consulted to place bilateral chest tubes for pleural effusions   -- R CT placed  1/17   -- L CT placed 1/18  -- continue incentive spirometry      Renal:  -- history of ESRD, dialysis dependent   - CRRT per nephrology, additional midodrine 10mg On Call PRN to be administered prior to HD  -- trend BUN/Cr  -- holding sevelamer in setting of poor PO intake and low daily phos  -- patient is anuric vs oliguric  -- permacath in place last exchanged 10/2023  -- US of AVF pending, will f/u  -- oxybutynin to reduce bladder spasm possibly triggering eulogio episodes    Recent Labs   Lab 01/20/24  0104 01/21/24  0236 01/22/24  0242   BUN 17 34* 47*   CREATININE 1.4 2.5* 3.7*           FEN / GI:   -- CT on 1/8/24 demonstrating SBO, now resolved  -- US on 1/8/24 showing distended gallbladder without signs of acute cholecystitis   - NGT removed  - Equivocal GGC 1/10  -- PPI  -- Replace lytes as needed  -- Nutrition: diabetic/renal/gluten free soft and bite sized diet with novasource daily  - Dietician consulted for complex dietary restriction  - home appetite stimulant dronabinol 5 mg BID  -- Bowel regiment: miralax and doc-senna      ID:   -- Tm: afebrile; WBC down trending  -- C/f HAP with RLL consolidation, ID following  -- MRSA neg, dc vanc  -- zosyn broadened to meropenem given pleural effusions, completed Merrem on 1/20/24  -- Start ceftriaxone 2 gm IV daily plus PO metronidazole 500 mg BID on 1/21/24  -- can transition ceftriaxone to cefpodoxime (renally dosed)   -- Continue treatment for 4 weeks (end date: 2/15/24)  -- blood cultures with 1 bottle growing staph epi, likely a contaminant  -- pleural cultures (1/18): NGTD    Recent Labs   Lab 01/20/24  1152 01/21/24  0236 01/22/24  0242   WBC 18.74* 17.79* 15.20*           Heme/Onc:  -- Daily CBC  -- heparin gtt for A-fib, held 5 days after PPM 1/16, restarted 1/22  -- S/p 1 unit pRBC's on 1/20/24 for Hb 6.8    Recent Labs   Lab 01/17/24  0316 01/18/24  0348 01/19/24  0318 01/20/24  0101 01/20/24  1152 01/21/24  0236 01/22/24  0242   HGB 7.6* 7.2* 7.2*    < > 8.6* 8.8* 8.5*    169 194   < > 205 181 209   APTT 23.9 22.2 24.8  --   --   --   --     < > = values in this interval not displayed.           Endo:   -- history of diabetes  -- Gluc goal 140-180  -- detemir 6u QD  -- SSI      PPx:   Feeding: diabetic/renal/gluten diet and novasource  Analgesia/Sedation: Pain is controlled, no sedation  Thromboembolic prevention: heparin gtt  HOB >30: yes  Stress Ulcer ppx: pantoprazole  Glucose control: Critical care goal 140-180 g/dl, detemir and SSI  Bowel reg: miralax and doc-senna  Invasive Lines/Drains/Airway: Permacath, Bilateral chest tubes, PIV x2  Deescalation: SD if she tolerates HD today        Dispo/Code Status/Palliative:   -- Full Code  -- SD if she tolerated HD today   -- Patient needs to get out of bed and move, appreciate nursing, and PT and OT assistance/help in this matter

## 2024-01-22 NOTE — PLAN OF CARE
[facility]  Discharge Reassessment    Primary Care Provider: Pavel Calixto MD    Expected Discharge Date: 1/26/2024    Reassessment (most recent)       Discharge Reassessment - 01/22/24 1109          Discharge Reassessment    Assessment Type Discharge Planning Reassessment     Discharge Plan discussed with: Patient;Caregiver     Communicated CAMPBELL with patient/caregiver Date not available/Unable to determine     Discharge Plan A Home;Home with family     Discharge Plan B Home Health     DME Needed Upon Discharge  none   TBD    Transition of Care Barriers None     Why the patient remains in the hospital Requires continued medical care        Post-Acute Status    Post-Acute Authorization Placement;Home Health     Post-Acute Placement Status Patient declined/refused     Home Health Status Referrals Sent                     Per MD's Note,  Interval History/Significant Events: NAEON. Patient remained in NSR overnight and hemodynamically stable. Pleural Cx NGTD. CT's with 330 cc output from R and 300 cc from L. Last HD on Friday. Will get HD today and if well tolerated, she can step down today.       Discharge Plan A and Plan B have been determined by review of patient's clinical status, future medical and therapeutic needs, and coverage/benefits for post-acute care in coordination with multidisciplinary team members.     Alberto Fitch LMSW  Case Management [facility]

## 2024-01-22 NOTE — PROGRESS NOTES
[facility]  Cardiac Electrophysiology  Progress Note    Admission Date: 1/2/2024  Code Status: Full Code   Attending Physician: Tan Thomson MD   Expected Discharge Date: 1/26/2024  Principal Problem:Malignant carcinoid tumor of bronchus    Subjective:     Interval History: NAEON. Had episode of rate controlled AF overnight that converted. No further tele events. Chest tubes in place.     Review of Systems   Constitutional: Negative for diaphoresis and fever.   Cardiovascular:  Negative for chest pain, dyspnea on exertion, leg swelling, near-syncope, orthopnea, palpitations, paroxysmal nocturnal dyspnea and syncope.   Respiratory:  Negative for cough and shortness of breath.    Gastrointestinal:  Negative for abdominal pain, diarrhea, nausea and vomiting.   Neurological:  Negative for light-headedness.   Psychiatric/Behavioral:  Negative for altered mental status and substance abuse.      Objective:     Vital Signs (Most Recent):  Temp: 97.8 °F (36.6 °C) (01/21/24 1600)  Pulse: 80 (01/21/24 2200)  Resp: 13 (01/21/24 2200)  BP: (!) 108/58 (01/21/24 2200)  SpO2: 100 % (01/21/24 2200) Vital Signs (24h Range):  Temp:  [97.6 °F (36.4 °C)-97.8 °F (36.6 °C)] 97.8 °F (36.6 °C)  Pulse:  [72-91] 80  Resp:  [10-43] 13  SpO2:  [94 %-100 %] 100 %  BP: ()/(51-66) 108/58     Weight: 68.2 kg (150 lb 5.7 oz)  Body mass index is 25.02 kg/m².     SpO2: 100 %        Physical Exam  Constitutional:       Appearance: Normal appearance.   Cardiovascular:      Rate and Rhythm: Normal rate and regular rhythm.      Pulses: Normal pulses.      Heart sounds: Normal heart sounds.   Pulmonary:      Effort: Pulmonary effort is normal.      Breath sounds: Normal breath sounds. No wheezing or rales.      Comments: Chest tubes in place  Abdominal:      General: Abdomen is flat. There is no distension.      Palpations: Abdomen is soft.      Tenderness: There is no abdominal tenderness.   Musculoskeletal:      Right  lower leg: No edema.      Left lower leg: No edema.   Skin:     General: Skin is warm and dry.   Neurological:      Mental Status: She is alert and oriented to person, place, and time. Mental status is at baseline.   Psychiatric:         Mood and Affect: Mood normal.         Behavior: Behavior normal.            Significant Labs: BMP:   Recent Labs   Lab 01/20/24  0104 01/21/24  0236   * 125*   * 137   K 4.0 4.5    107   CO2 23 24   BUN 17 34*   CREATININE 1.4 2.5*   CALCIUM 7.7* 8.2*   MG 2.0 2.1     , CMP:   Recent Labs   Lab 01/20/24  0104 01/21/24  0236   * 137   K 4.0 4.5    107   CO2 23 24   * 125*   BUN 17 34*   CREATININE 1.4 2.5*   CALCIUM 7.7* 8.2*   PROT 4.5* 4.9*   ALBUMIN 1.7* 1.9*   BILITOT 0.5 0.6   ALKPHOS 104 97   AST 17 15   ALT 11 9*   ANIONGAP 5* 6*     , and CBC:   Recent Labs   Lab 01/20/24  0101 01/20/24  1152 01/21/24  0236   WBC 16.54* 18.74* 17.79*   HGB 6.8* 8.6* 8.8*   HCT 21.8* 26.8* 27.0*    205 181           Assessment and Plan:     Atrial fibrillation  #Bradycardia    73yoF with ESRD admitted for bronchial tumor removal with post op course complicated by atrial fibrillation. She had difficult to control rates but further post-operatively, her rates improved so was weaned off rate controlling agents. Overnight 1/11-1/12 and morning of 1/12 she had several bradycardic episodes in the setting of atrial fibrillation, recent digoxin use (reportedly renally dosed, but some high levels with ESRD).    In the setting of asystolic events with several short rounds of compressions (no more than 1 round at a time), TVP was attempted. 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 She is post PTA and stenting of the left innominate vein. This was done at an outside hospital. Micra implantation was decided on due to infection risk and difficult venous anatomy.    EF 60-65% during this admission  Jan 13, 2024 EKG: Sinus rhythm, QRS 76    Recommendations:  - Micra in placed 1/16  - S/p chest tube 1/17, contralateral chest tube 1/18  - Okay to leave at current VVI 60 (provides backup rate of 60)  - On discharge would have her follow up with EP in 6 weeks  - EP to sign off. We appreciate your consultation        Connor M Gillies, MD  Cardiac Electrophysiology  [facility]

## 2024-01-23 ENCOUNTER — ANESTHESIA EVENT (OUTPATIENT)
Dept: ENDOSCOPY | Facility: HOSPITAL | Age: 74
DRG: 163 | End: 2024-01-23
Payer: MEDICARE

## 2024-01-23 LAB
ABO + RH BLD: NORMAL
ALBUMIN SERPL BCP-MCNC: 1.7 G/DL (ref 3.5–5.2)
ALP SERPL-CCNC: 93 U/L (ref 55–135)
ALT SERPL W/O P-5'-P-CCNC: 5 U/L (ref 10–44)
ANION GAP SERPL CALC-SCNC: 7 MMOL/L (ref 8–16)
ANISOCYTOSIS BLD QL SMEAR: SLIGHT
APTT PPP: 66.2 SEC (ref 21–32)
APTT PPP: 69 SEC (ref 21–32)
APTT PPP: 80.9 SEC (ref 21–32)
APTT PPP: >150 SEC (ref 21–32)
AST SERPL-CCNC: 13 U/L (ref 10–40)
BACTERIA FLD CULT: NORMAL
BASOPHILS # BLD AUTO: 0.01 K/UL (ref 0–0.2)
BASOPHILS # BLD AUTO: 0.01 K/UL (ref 0–0.2)
BASOPHILS NFR BLD: 0.1 % (ref 0–1.9)
BASOPHILS NFR BLD: 0.1 % (ref 0–1.9)
BILIRUB SERPL-MCNC: 0.3 MG/DL (ref 0.1–1)
BLD GP AB SCN CELLS X3 SERPL QL: NORMAL
BLD PROD TYP BPU: NORMAL
BLOOD UNIT EXPIRATION DATE: NORMAL
BLOOD UNIT TYPE CODE: 6200
BLOOD UNIT TYPE: NORMAL
BUN SERPL-MCNC: 34 MG/DL (ref 8–23)
CALCIUM SERPL-MCNC: 7.8 MG/DL (ref 8.7–10.5)
CHLORIDE SERPL-SCNC: 103 MMOL/L (ref 95–110)
CO2 SERPL-SCNC: 26 MMOL/L (ref 23–29)
CODING SYSTEM: NORMAL
CREAT SERPL-MCNC: 2.7 MG/DL (ref 0.5–1.4)
CROSSMATCH INTERPRETATION: NORMAL
DIFFERENTIAL METHOD BLD: ABNORMAL
DIFFERENTIAL METHOD BLD: ABNORMAL
DISPENSE STATUS: NORMAL
EOSINOPHIL # BLD AUTO: 0 K/UL (ref 0–0.5)
EOSINOPHIL # BLD AUTO: 0.1 K/UL (ref 0–0.5)
EOSINOPHIL NFR BLD: 0 % (ref 0–8)
EOSINOPHIL NFR BLD: 0.5 % (ref 0–8)
ERYTHROCYTE [DISTWIDTH] IN BLOOD BY AUTOMATED COUNT: 21.7 % (ref 11.5–14.5)
ERYTHROCYTE [DISTWIDTH] IN BLOOD BY AUTOMATED COUNT: 22 % (ref 11.5–14.5)
EST. GFR  (NO RACE VARIABLE): 18.1 ML/MIN/1.73 M^2
FIBRINOGEN PPP-MCNC: 400 MG/DL (ref 182–400)
GLUCOSE SERPL-MCNC: 168 MG/DL (ref 70–110)
HCT VFR BLD AUTO: 17.8 % (ref 37–48.5)
HCT VFR BLD AUTO: 23.1 % (ref 37–48.5)
HGB BLD-MCNC: 5.5 G/DL (ref 12–16)
HGB BLD-MCNC: 7.4 G/DL (ref 12–16)
HYPOCHROMIA BLD QL SMEAR: ABNORMAL
IMM GRANULOCYTES # BLD AUTO: 0.19 K/UL (ref 0–0.04)
IMM GRANULOCYTES # BLD AUTO: 0.31 K/UL (ref 0–0.04)
IMM GRANULOCYTES NFR BLD AUTO: 1.5 % (ref 0–0.5)
IMM GRANULOCYTES NFR BLD AUTO: 2 % (ref 0–0.5)
INR PPP: 1.3 (ref 0.8–1.2)
LYMPHOCYTES # BLD AUTO: 0.8 K/UL (ref 1–4.8)
LYMPHOCYTES # BLD AUTO: 1 K/UL (ref 1–4.8)
LYMPHOCYTES NFR BLD: 5.4 % (ref 18–48)
LYMPHOCYTES NFR BLD: 7.5 % (ref 18–48)
MAGNESIUM SERPL-MCNC: 1.9 MG/DL (ref 1.6–2.6)
MCH RBC QN AUTO: 29.3 PG (ref 27–31)
MCH RBC QN AUTO: 29.5 PG (ref 27–31)
MCHC RBC AUTO-ENTMCNC: 30.9 G/DL (ref 32–36)
MCHC RBC AUTO-ENTMCNC: 32 G/DL (ref 32–36)
MCV RBC AUTO: 92 FL (ref 82–98)
MCV RBC AUTO: 95 FL (ref 82–98)
MONOCYTES # BLD AUTO: 1 K/UL (ref 0.3–1)
MONOCYTES # BLD AUTO: 1.3 K/UL (ref 0.3–1)
MONOCYTES NFR BLD: 6.7 % (ref 4–15)
MONOCYTES NFR BLD: 9.9 % (ref 4–15)
NEUTROPHILS # BLD AUTO: 10.6 K/UL (ref 1.8–7.7)
NEUTROPHILS # BLD AUTO: 13.4 K/UL (ref 1.8–7.7)
NEUTROPHILS NFR BLD: 80.5 % (ref 38–73)
NEUTROPHILS NFR BLD: 85.8 % (ref 38–73)
NRBC BLD-RTO: 0 /100 WBC
NRBC BLD-RTO: 0 /100 WBC
NUM UNITS TRANS FFP: NORMAL
NUM UNITS TRANS PACKED RBC: NORMAL
NUM UNITS TRANS PACKED RBC: NORMAL
OVALOCYTES BLD QL SMEAR: ABNORMAL
PHOSPHATE SERPL-MCNC: 2.4 MG/DL (ref 2.7–4.5)
PLATELET # BLD AUTO: 207 K/UL (ref 150–450)
PLATELET # BLD AUTO: 225 K/UL (ref 150–450)
PLATELET BLD QL SMEAR: ABNORMAL
PMV BLD AUTO: 11.9 FL (ref 9.2–12.9)
PMV BLD AUTO: 12 FL (ref 9.2–12.9)
POCT GLUCOSE: 221 MG/DL (ref 70–110)
POCT GLUCOSE: 224 MG/DL (ref 70–110)
POCT GLUCOSE: 250 MG/DL (ref 70–110)
POCT GLUCOSE: 316 MG/DL (ref 70–110)
POCT GLUCOSE: 322 MG/DL (ref 70–110)
POIKILOCYTOSIS BLD QL SMEAR: SLIGHT
POLYCHROMASIA BLD QL SMEAR: ABNORMAL
POTASSIUM SERPL-SCNC: 4 MMOL/L (ref 3.5–5.1)
PROT SERPL-MCNC: 4.7 G/DL (ref 6–8.4)
PROTHROMBIN TIME: 13.7 SEC (ref 9–12.5)
RBC # BLD AUTO: 1.88 M/UL (ref 4–5.4)
RBC # BLD AUTO: 2.51 M/UL (ref 4–5.4)
SODIUM SERPL-SCNC: 136 MMOL/L (ref 136–145)
SPECIMEN OUTDATE: NORMAL
SPHEROCYTES BLD QL SMEAR: ABNORMAL
TARGETS BLD QL SMEAR: ABNORMAL
WBC # BLD AUTO: 13.08 K/UL (ref 3.9–12.7)
WBC # BLD AUTO: 15.62 K/UL (ref 3.9–12.7)

## 2024-01-23 PROCEDURE — 85610 PROTHROMBIN TIME: CPT

## 2024-01-23 PROCEDURE — 97116 GAIT TRAINING THERAPY: CPT

## 2024-01-23 PROCEDURE — 25000003 PHARM REV CODE 250

## 2024-01-23 PROCEDURE — 85730 THROMBOPLASTIN TIME PARTIAL: CPT | Mod: 91

## 2024-01-23 PROCEDURE — 83735 ASSAY OF MAGNESIUM: CPT

## 2024-01-23 PROCEDURE — P9016 RBC LEUKOCYTES REDUCED: HCPCS

## 2024-01-23 PROCEDURE — 99291 CRITICAL CARE FIRST HOUR: CPT | Mod: ,,, | Performed by: ANESTHESIOLOGY

## 2024-01-23 PROCEDURE — 51798 US URINE CAPACITY MEASURE: CPT

## 2024-01-23 PROCEDURE — 97530 THERAPEUTIC ACTIVITIES: CPT

## 2024-01-23 PROCEDURE — 63600175 PHARM REV CODE 636 W HCPCS

## 2024-01-23 PROCEDURE — 99232 SBSQ HOSP IP/OBS MODERATE 35: CPT | Mod: ,,, | Performed by: NURSE PRACTITIONER

## 2024-01-23 PROCEDURE — 84100 ASSAY OF PHOSPHORUS: CPT

## 2024-01-23 PROCEDURE — 63600175 PHARM REV CODE 636 W HCPCS: Performed by: NURSE PRACTITIONER

## 2024-01-23 PROCEDURE — A4216 STERILE WATER/SALINE, 10 ML: HCPCS | Performed by: STUDENT IN AN ORGANIZED HEALTH CARE EDUCATION/TRAINING PROGRAM

## 2024-01-23 PROCEDURE — 86920 COMPATIBILITY TEST SPIN: CPT

## 2024-01-23 PROCEDURE — 85025 COMPLETE CBC W/AUTO DIFF WBC: CPT | Performed by: STUDENT IN AN ORGANIZED HEALTH CARE EDUCATION/TRAINING PROGRAM

## 2024-01-23 PROCEDURE — C9113 INJ PANTOPRAZOLE SODIUM, VIA: HCPCS

## 2024-01-23 PROCEDURE — 25500020 PHARM REV CODE 255: Performed by: STUDENT IN AN ORGANIZED HEALTH CARE EDUCATION/TRAINING PROGRAM

## 2024-01-23 PROCEDURE — 80053 COMPREHEN METABOLIC PANEL: CPT | Performed by: STUDENT IN AN ORGANIZED HEALTH CARE EDUCATION/TRAINING PROGRAM

## 2024-01-23 PROCEDURE — 85025 COMPLETE CBC W/AUTO DIFF WBC: CPT | Mod: 91

## 2024-01-23 PROCEDURE — P9017 PLASMA 1 DONOR FRZ W/IN 8 HR: HCPCS

## 2024-01-23 PROCEDURE — 25000003 PHARM REV CODE 250: Performed by: SURGERY

## 2024-01-23 PROCEDURE — 85384 FIBRINOGEN ACTIVITY: CPT

## 2024-01-23 PROCEDURE — 25000003 PHARM REV CODE 250: Performed by: STUDENT IN AN ORGANIZED HEALTH CARE EDUCATION/TRAINING PROGRAM

## 2024-01-23 PROCEDURE — 85730 THROMBOPLASTIN TIME PARTIAL: CPT | Mod: 91 | Performed by: STUDENT IN AN ORGANIZED HEALTH CARE EDUCATION/TRAINING PROGRAM

## 2024-01-23 PROCEDURE — 11000001 HC ACUTE MED/SURG PRIVATE ROOM

## 2024-01-23 PROCEDURE — 86901 BLOOD TYPING SEROLOGIC RH(D): CPT

## 2024-01-23 RX ORDER — PANTOPRAZOLE SODIUM 40 MG/10ML
40 INJECTION, POWDER, LYOPHILIZED, FOR SOLUTION INTRAVENOUS 2 TIMES DAILY
Status: DISCONTINUED | OUTPATIENT
Start: 2024-01-23 | End: 2024-01-25

## 2024-01-23 RX ORDER — HYDROCODONE BITARTRATE AND ACETAMINOPHEN 500; 5 MG/1; MG/1
TABLET ORAL
Status: DISCONTINUED | OUTPATIENT
Start: 2024-01-23 | End: 2024-01-24

## 2024-01-23 RX ORDER — MIDODRINE HYDROCHLORIDE 5 MG/1
5 TABLET ORAL ONCE
Status: DISCONTINUED | OUTPATIENT
Start: 2024-01-23 | End: 2024-01-24

## 2024-01-23 RX ORDER — INSULIN ASPART 100 [IU]/ML
3 INJECTION, SOLUTION INTRAVENOUS; SUBCUTANEOUS
Status: DISCONTINUED | OUTPATIENT
Start: 2024-01-24 | End: 2024-01-25

## 2024-01-23 RX ORDER — PROTAMINE SULFATE 10 MG/ML
50 INJECTION, SOLUTION INTRAVENOUS ONCE
Status: COMPLETED | OUTPATIENT
Start: 2024-01-23 | End: 2024-01-23

## 2024-01-23 RX ORDER — MIDODRINE HYDROCHLORIDE 5 MG/1
10 TABLET ORAL 2 TIMES DAILY WITH MEALS
Status: DISCONTINUED | OUTPATIENT
Start: 2024-01-23 | End: 2024-02-06 | Stop reason: HOSPADM

## 2024-01-23 RX ADMIN — IOHEXOL 100 ML: 350 INJECTION, SOLUTION INTRAVENOUS at 09:01

## 2024-01-23 RX ADMIN — HEPARIN SODIUM AND DEXTROSE 15 UNITS/KG/HR: 10000; 5 INJECTION INTRAVENOUS at 10:01

## 2024-01-23 RX ADMIN — METRONIDAZOLE 500 MG: 500 TABLET ORAL at 08:01

## 2024-01-23 RX ADMIN — HEPARIN SODIUM 1000 UNITS: 1000 INJECTION, SOLUTION INTRAVENOUS; SUBCUTANEOUS at 12:01

## 2024-01-23 RX ADMIN — Medication 10 ML: at 12:01

## 2024-01-23 RX ADMIN — INSULIN ASPART 8 UNITS: 100 INJECTION, SOLUTION INTRAVENOUS; SUBCUTANEOUS at 04:01

## 2024-01-23 RX ADMIN — METRONIDAZOLE 500 MG: 500 TABLET ORAL at 10:01

## 2024-01-23 RX ADMIN — OXYBUTYNIN CHLORIDE 5 MG: 5 TABLET ORAL at 10:01

## 2024-01-23 RX ADMIN — SENNOSIDES AND DOCUSATE SODIUM 1 TABLET: 8.6; 5 TABLET ORAL at 08:01

## 2024-01-23 RX ADMIN — Medication 10 ML: at 05:01

## 2024-01-23 RX ADMIN — POLYETHYLENE GLYCOL 3350 17 G: 17 POWDER, FOR SOLUTION ORAL at 09:01

## 2024-01-23 RX ADMIN — LIDOCAINE 5% 1 PATCH: 700 PATCH TOPICAL at 09:01

## 2024-01-23 RX ADMIN — CEFPODOXIME PROXETIL 200 MG: 200 TABLET, FILM COATED ORAL at 10:01

## 2024-01-23 RX ADMIN — DRONABINOL 5 MG: 2.5 CAPSULE ORAL at 10:01

## 2024-01-23 RX ADMIN — OXYBUTYNIN CHLORIDE 5 MG: 5 TABLET ORAL at 04:01

## 2024-01-23 RX ADMIN — Medication 10 ML: at 06:01

## 2024-01-23 RX ADMIN — MIDODRINE HYDROCHLORIDE 5 MG: 5 TABLET ORAL at 08:01

## 2024-01-23 RX ADMIN — VENLAFAXINE 37.5 MG: 37.5 TABLET ORAL at 08:01

## 2024-01-23 RX ADMIN — PANTOPRAZOLE SODIUM 40 MG: 40 TABLET, DELAYED RELEASE ORAL at 08:01

## 2024-01-23 RX ADMIN — PROTAMINE SULFATE 50 MG: 10 INJECTION, SOLUTION INTRAVENOUS at 06:01

## 2024-01-23 RX ADMIN — INSULIN ASPART 4 UNITS: 100 INJECTION, SOLUTION INTRAVENOUS; SUBCUTANEOUS at 08:01

## 2024-01-23 RX ADMIN — ONDANSETRON 8 MG: 2 INJECTION INTRAMUSCULAR; INTRAVENOUS at 02:01

## 2024-01-23 RX ADMIN — PANTOPRAZOLE SODIUM 40 MG: 40 INJECTION, POWDER, FOR SOLUTION INTRAVENOUS at 10:01

## 2024-01-23 RX ADMIN — OXYBUTYNIN CHLORIDE 5 MG: 5 TABLET ORAL at 09:01

## 2024-01-23 RX ADMIN — MIDODRINE HYDROCHLORIDE 10 MG: 5 TABLET ORAL at 04:01

## 2024-01-23 RX ADMIN — METOPROLOL TARTRATE 12.5 MG: 25 TABLET, FILM COATED ORAL at 10:01

## 2024-01-23 RX ADMIN — INSULIN ASPART 4 UNITS: 100 INJECTION, SOLUTION INTRAVENOUS; SUBCUTANEOUS at 12:01

## 2024-01-23 RX ADMIN — DRONABINOL 5 MG: 2.5 CAPSULE ORAL at 08:01

## 2024-01-23 RX ADMIN — INSULIN ASPART 2 UNITS: 100 INJECTION, SOLUTION INTRAVENOUS; SUBCUTANEOUS at 08:01

## 2024-01-23 NOTE — PROGRESS NOTES
[facility]  Thoracic Surgery  Progress Note    Subjective:     History of Present Illness:  No notes on file    Post-Op Info:  Procedure(s) (LRB):  INSERTION, CARDIAC PACEMAKER, LEADLESS (N/A)   7 Days Post-Op     Interval History:   NAEON. HD yesterday evening, midodrine PRN given prior to [id number]., She tolerated it fairly well - they werte able to pull 300 off (up from 0 prior). No significant hypotension or HR disturbances during session. Feeling okay this am. Chest tube output low following HD.   LUE AVF cleared for use       Medications:  Continuous Infusions:   heparin (porcine) in D5W 15 Units/kg/hr (01/23/24 0500)       Scheduled Meds:   cefpodoxime  200 mg Oral QHS    droNABinol  5 mg Oral BID    epoetin noble (PROCRIT) injection  4,000 Units Intravenous Every Mon, Wed, Fri    insulin detemir U-100  6 Units Subcutaneous Daily    LIDOcaine  1 patch Transdermal Q24H    metoprolol tartrate  12.5 mg Oral BID    metroNIDAZOLE  500 mg Oral Q12H    midodrine  5 mg Oral BID WM    oxybutynin  5 mg Oral TID    pantoprazole  40 mg Oral Daily    polyethylene glycol  17 g Oral Daily    senna-docusate 8.6-50 mg  1 tablet Oral Daily    sodium chloride 0.9%  10 mL Intravenous Q6H    venlafaxine  37.5 mg Oral Daily     PRN Meds:sodium chloride 0.9%, acetaminophen, albuterol sulfate, bisacodyL, dextrose 10%, dextrose 10%, glucagon (human recombinant), glucose, glucose, heparin (porcine), heparin (PORCINE), heparin (PORCINE), hydrALAZINE, insulin aspart U-100, midodrine, ondansetron, sodium chloride 0.9%, Flushing PICC/Midline Protocol **AND** sodium chloride 0.9% **AND** sodium chloride 0.9%     Review of patient's allergies indicates:   Allergen Reactions    Crestor [rosuvastatin] Swelling    Gluten protein      Objective:     Vital Signs (Most Recent):  Temp: 97.5 °F (36.4 °C) (01/23/24 0430)  Pulse: 90 (01/23/24 0535)  Resp: 15 (01/23/24 0430)  BP: 100/61 (01/23/24 0430)  SpO2: 100 % (01/23/24  0430) Vital Signs (24h Range):  Temp:  [97.4 °F (36.3 °C)-98.2 °F (36.8 °C)] 97.5 °F (36.4 °C)  Pulse:  [77-97] 90  Resp:  [9-44] 15  SpO2:  [95 %-100 %] 100 %  BP: ()/(52-76) 100/61     Intake/Output - Last 3 Shifts         01/21 0700  01/22 0659 01/22 0700 01/23 0659 01/23 0700 01/24 0659    P.O. 300 720     I.V. (mL/kg) 14.8 (0.2) 201.1 (3)     Other  600     IV Piggyback 99.7 132.7     Total Intake(mL/kg) 414.5 (6.1) 1653.7 (24.4)     Other  900     Stool 0      Chest Tube 630 820     Total Output 630 1720     Net -215.5 -66.3            Stool Occurrence 2 x              SpO2: 100 %       Physical Exam  Vitals and nursing note reviewed.   Constitutional:       Appearance: Normal appearance.   HENT:      Head: Normocephalic and atraumatic.      Nose: Nose normal.   Cardiovascular:      Rate and Rhythm: Normal rate and regular rhythm.      Pulses: Normal pulses.   Pulmonary:      Effort: Pulmonary effort is normal. No respiratory distress.      Comments: Right posterior chest incision c/d/I    Right chest permacath    Right Chest tube, 10 fr, serosanguinous output, waterseal   Left chest tube, 10 fr, serosanguinous output, waterseal   Abdominal:      General: Abdomen is flat. There is no distension.      Palpations: Abdomen is soft.      Tenderness: There is no abdominal tenderness.      Comments: Soft, nontender abdomen   Musculoskeletal:      Right lower leg: Edema present.      Left lower leg: Edema present.   Skin:     General: Skin is warm and dry.      Coloration: Skin is pale.      Comments: Groin soft, dressings c/d/i   Neurological:      General: No focal deficit present.      Mental Status: She is alert.            Significant Labs:  CBC:   Recent Labs   Lab 01/23/24  0500   WBC 13.08*   RBC 2.51*   HGB 7.4*   HCT 23.1*      MCV 92   MCH 29.5   MCHC 32.0         Significant Diagnostics:  I have reviewed and interpreted all pertinent imaging results/findings within the past 24 hours.    VTE  Risk Mitigation (From admission, onward)           Ordered     heparin 25,000 units in dextrose 5% (100 units/ml) IV bolus from bag - ADDITIONAL PRN BOLUS - 60 units/kg  As needed (PRN)        Question:  Heparin Infusion Adjustment (DO NOT MODIFY ANSWER)  Answer:  [email or web address]\Images\Pharmacy\HeparinInfusions\heparin LOW INTENSITY nomogram for [facility] [id number]    01/22/24 0740     heparin 25,000 units in dextrose 5% (100 units/ml) IV bolus from bag - ADDITIONAL PRN BOLUS - 30 units/kg  As needed (PRN)        Question:  Heparin Infusion Adjustment (DO NOT MODIFY ANSWER)  Answer:  [email or web address]\epic\Images\Pharmacy\HeparinInfusions\heparin LOW INTENSITY nomogram for [facility] [id number]    01/22/24 0740     heparin 25,000 units in dextrose 5% 250 mL (100 units/mL) infusion LOW INTENSITY nomogram - [facility]  Continuous        Question:  Begin at (units/kg/hr)  Answer:  12    01/22/24 0740     heparin 25,000 units in dextrose 5% 250 mL (100 units/mL) infusion LOW INTENSITY nomogram - [facility]  Continuous        Question:  Begin at (units/kg/hr)  Answer:  16    01/15/24 1038     heparin (porcine) injection 1,000 Units  As needed (PRN)         01/11/24 1351     heparin (porcine) injection 1,000 Units  As needed (PRN)         01/03/24 1123     IP VTE HIGH RISK PATIENT  Once         01/02/24 1226     Place CARMELITA hose  Until discontinued         01/02/24 1226     Place sequential compression device  Until discontinued         01/02/24 1226                  Assessment/Plan:     * Malignant carcinoid tumor of bronchus  72 yo female with ESRD (TTS), DDD, benign essential tremor, meniere's disease, HTN, HLD, DM2, GERD, Celiac's, IBS and 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 Stepped up to the ICU 1/5 for afib RVR which was refractory to medical management and c/b development of tachy-eulogio syndrome now s/p micra placement 1/16 for tachy-eulogio syndrome in the  setting of RVR. Did have brief SBO type picture 1/7 which resolved with conservative management.     - s/p R thoracotomy with R lower lobectomy for carcinoid w/ MLND; IPV injury requiring repair. CT removed post operatively.  - Refractory afib RVR requiring ICU step up 1/05, s/p chemical cardioversion initially with recurrence c/b developing tachy-eulogio syndrome    - cards and EP following, appreciate assistance    - AC indicated 2/2 intermittent atrial fib episodes and chadsvasc score of 2     - s/p TVP placement and now micra implantation 1/16    - Heparin gtt resumed 1/22; will need to transition to eliquis likely following CT removal   - CT chest 1/13 w/ bilateral pleural effusion (R>L), pericardial effusion,   - s/p R chest tube placement with IR 1/17, L chest tube placement 1/18; Keep today and may stay to waterseal; monitr output   - Daily CXR while CT are in place   - Pulmonary toilet: IS, Acapella, Mucomyst daily   - Infectious work up 2/2 leukocytosis. She remains afebrile. Effusions and compressive atelectasis also likely contributing to leukocytosis.Trended up to 30k 1/16, now downtrending. Zosyn 1/11-1/15; Meropenam 1/16 - 1/20; Plan for Ceftriaxone and metronidazole to start 1/21 per ID. 4 week abx plan for empiric treatment of presumed parapneumonic effusion     - ID following, appreciate assistance. Reconsult following thora results.   - Bcx 1/11: Staph, probable contaminant  - Bcx1/12: NGTD   - Rapid blood PCR: staph epi  - Sputum Cx 1/11: klebsiella & e. Coli  - R pleural fluid Cx 1/17: NGTD  - SBO type episode 1/07 requiring NGT decompression, which resolved with conservative management   - Renal, gluten free diet, novasource renal BID; marinol for appetite   - Bowel regimen, PPI  - Nephrology following, appreciate assistance. CRRT 1/17 overnight. HD trial 1/19 passed but requiring midodrine, 1.5 L LR, metoprolol for RVR. HD 1/22 overnight tolerated much better with midodrine given prior    - LYNNE  AVF created 10.2023, US 1/22/2023 with good flow metrics, okay to use per vascular   - Oxybutinin for bladder spasms   - MM pain control as needed  - Continue PT/OT, mobilization, likely post hospital placement         Dispo: Okay for step down today         Kat Flores MD  Thoracic Surgery  [facility]

## 2024-01-23 NOTE — SUBJECTIVE & OBJECTIVE
Interval History/Significant Events: NAEON. She tolerated HD well while remaining HDS and in NSR with the additional 15 mg of midodrine given 1 hr before HD session. PM metoprolol dose was also held prior to HD. 300 cc removed during HD. Pressures soft during HD but not hypotensive. CT output serous and markedly decreased overnight bilaterally. CXR stable from yesterday. Patient continues to have an anxious mood.    Follow-up For: Procedure(s) (LRB):  INSERTION, CARDIAC PACEMAKER, LEADLESS (N/A)    Post-Operative Day: 7 Days Post-Op    Objective:     Vital Signs (Most Recent):  Temp: 97.6 °F (36.4 °C) (01/23/24 0701)  Pulse: 104 (01/23/24 0754)  Resp: (!) 29 (01/23/24 0754)  BP: 103/61 (01/23/24 0754)  SpO2: 100 % (01/23/24 0730) Vital Signs (24h Range):  Temp:  [97.4 °F (36.3 °C)-98.2 °F (36.8 °C)] 97.6 °F (36.4 °C)  Pulse:  [] 104  Resp:  [9-44] 29  SpO2:  [99 %-100 %] 100 %  BP: ()/(52-76) 103/61     Weight: 67.8 kg (149 lb 8 oz)  Body mass index is 24.88 kg/m².      Intake/Output Summary (Last 24 hours) at 1/23/2024 0831  Last data filed at 1/23/2024 0701  Gross per 24 hour   Intake 1432.28 ml   Output 1740 ml   Net -307.72 ml          Physical Exam  Vitals and nursing note reviewed.   Constitutional:       General: She is not in acute distress.     Appearance: Normal appearance. She is normal weight. She is not ill-appearing, toxic-appearing or diaphoretic.   HENT:      Head: Normocephalic and atraumatic.      Right Ear: External ear normal.      Left Ear: External ear normal.      Nose: Nose normal.   Eyes:      General: No scleral icterus.        Right eye: No discharge.         Left eye: No discharge.      Extraocular Movements: Extraocular movements intact.   Cardiovascular:      Rate and Rhythm: Normal rate and regular rhythm.   Pulmonary:      Effort: Pulmonary effort is normal. No respiratory distress.      Comments: Right posterior chest incision c/d/I    Right chest permacath    Right Chest  tube to waterseal   Left chest tube to waterseal   Abdominal:      General: Abdomen is flat. There is no distension.      Palpations: Abdomen is soft. There is no mass.      Tenderness: There is abdominal tenderness (mildly TTP diffusely). There is no guarding.      Hernia: No hernia is present.      Comments: Soft, nontender abdomen   Musculoskeletal:         General: Normal range of motion.      Right upper arm: Edema present.      Left upper arm: Edema present.      Right forearm: Edema present.      Left forearm: Edema present.      Cervical back: Normal range of motion and neck supple.      Right lower leg: Edema present.      Left lower leg: Edema present.   Skin:     General: Skin is warm and dry.      Coloration: Skin is pale.      Comments: Groin soft, dressings c/d/i   Neurological:      General: No focal deficit present.      Mental Status: She is alert and oriented to person, place, and time. Mental status is at baseline.   Psychiatric:         Mood and Affect: Mood is anxious.         Speech: Speech is delayed.         Behavior: Behavior is slowed and withdrawn.         Thought Content: Thought content normal.            Vents:  Oxygen Concentration (%): 24 (01/15/24 0200)    Lines/Drains/Airways       Central Venous Catheter Line  Duration                  Hemodialysis Catheter 01/25/23 1501 right internal jugular 362 days              Drain  Duration                  Chest Tube 01/17/24 1504 Right Pleural 10 Fr. 5 days         Chest Tube 01/18/24 1537 Left Pleural 10 Fr. 4 days              Peripheral Intravenous Line  Duration                  Hemodialysis AV Fistula 09/01/23 0800 Left forearm 144 days         Peripheral IV - Single Lumen 01/20/24 0128 20 G Right Antecubital 3 days         Midline Catheter Insertion/Assessment  - Single Lumen 01/22/24 1707 Right basilic vein (medial side of arm) 18g x 10cm <1 day                    Significant Labs:    CBC/Anemia Profile:  Recent Labs   Lab  01/22/24  0242 01/23/24  0500   WBC 15.20* 13.08*   HGB 8.5* 7.4*   HCT 27.1* 23.1*    207   MCV 94 92   RDW 21.5* 21.7*        Chemistries:  Recent Labs   Lab 01/22/24  0242 01/22/24  1232 01/23/24  0500     --  136   K 4.5  --  4.0     --  103   CO2 20*  --  26   BUN 47*  --  34*   CREATININE 3.7*  --  2.7*   CALCIUM 8.4*  --  7.8*   ALBUMIN 1.8*  --  1.7*   PROT 5.1*  --  4.7*   BILITOT 0.5  --  0.3   ALKPHOS 95  --  93   ALT 7*  --  5*   AST 13  --  13   MG  --  2.2 1.9   PHOS  --  2.6* 2.4*       All pertinent labs within the past 24 hours have been reviewed.    Significant Imaging:  I have reviewed all pertinent imaging results/findings within the past 24 hours.

## 2024-01-23 NOTE — PROGRESS NOTES
[facility]  Critical Care - Surgery  Progress Note    Patient Name: Lily Green  MRN: [id number]  Admission Date: 1/2/2024  Hospital Length of Stay: 21 days  Code Status: Full Code  Attending Provider: Tan Thomson MD  Primary Care Provider: Pavel Calixto MD   Principal Problem: Malignant carcinoid tumor of bronchus    Subjective:     Hospital/ICU Course:  No notes on file    Interval History/Significant Events: NAEON. She tolerated HD well while remaining HDS and in NSR with the additional 15 mg of midodrine given 1 hr before HD session. PM metoprolol dose was also held prior to HD. 300 cc removed during HD. Pressures soft during HD but not hypotensive. CT output serous and markedly decreased overnight bilaterally. CXR stable from yesterday. Patient continues to have an anxious mood.    Follow-up For: Procedure(s) (LRB):  INSERTION, CARDIAC PACEMAKER, LEADLESS (N/A)    Post-Operative Day: 7 Days Post-Op    Objective:     Vital Signs (Most Recent):  Temp: 97.6 °F (36.4 °C) (01/23/24 0701)  Pulse: 104 (01/23/24 0754)  Resp: (!) 29 (01/23/24 0754)  BP: 103/61 (01/23/24 0754)  SpO2: 100 % (01/23/24 0730) Vital Signs (24h Range):  Temp:  [97.4 °F (36.3 °C)-98.2 °F (36.8 °C)] 97.6 °F (36.4 °C)  Pulse:  [] 104  Resp:  [9-44] 29  SpO2:  [99 %-100 %] 100 %  BP: ()/(52-76) 103/61     Weight: 67.8 kg (149 lb 8 oz)  Body mass index is 24.88 kg/m².      Intake/Output Summary (Last 24 hours) at 1/23/2024 0831  Last data filed at 1/23/2024 0701  Gross per 24 hour   Intake 1432.28 ml   Output 1740 ml   Net -307.72 ml          Physical Exam  Vitals and nursing note reviewed.   Constitutional:       General: She is not in acute distress.     Appearance: Normal appearance. She is normal weight. She is not ill-appearing, toxic-appearing or diaphoretic.   HENT:      Head: Normocephalic and atraumatic.      Right Ear: External ear normal.      Left Ear: External ear normal.       Nose: Nose normal.   Eyes:      General: No scleral icterus.        Right eye: No discharge.         Left eye: No discharge.      Extraocular Movements: Extraocular movements intact.   Cardiovascular:      Rate and Rhythm: Normal rate and regular rhythm.   Pulmonary:      Effort: Pulmonary effort is normal. No respiratory distress.      Comments: Right posterior chest incision c/d/I    Right chest permacath    Right Chest tube to waterseal   Left chest tube to waterseal   Abdominal:      General: Abdomen is flat. There is no distension.      Palpations: Abdomen is soft. There is no mass.      Tenderness: There is abdominal tenderness (mildly TTP diffusely). There is no guarding.      Hernia: No hernia is present.      Comments: Soft, nontender abdomen   Musculoskeletal:         General: Normal range of motion.      Right upper arm: Edema present.      Left upper arm: Edema present.      Right forearm: Edema present.      Left forearm: Edema present.      Cervical back: Normal range of motion and neck supple.      Right lower leg: Edema present.      Left lower leg: Edema present.   Skin:     General: Skin is warm and dry.      Coloration: Skin is pale.      Comments: Groin soft, dressings c/d/i   Neurological:      General: No focal deficit present.      Mental Status: She is alert and oriented to person, place, and time. Mental status is at baseline.   Psychiatric:         Mood and Affect: Mood is anxious.         Speech: Speech is delayed.         Behavior: Behavior is slowed and withdrawn.         Thought Content: Thought content normal.            Vents:  Oxygen Concentration (%): 24 (01/15/24 0200)    Lines/Drains/Airways       Central Venous Catheter Line  Duration                  Hemodialysis Catheter 01/25/23 1501 right internal jugular 362 days              Drain  Duration                  Chest Tube 01/17/24 1504 Right Pleural 10 Fr. 5 days         Chest Tube 01/18/24 1537 Left Pleural 10 Fr. 4 days               Peripheral Intravenous Line  Duration                  Hemodialysis AV Fistula 09/01/23 0800 Left forearm 144 days         Peripheral IV - Single Lumen 01/20/24 0128 20 G Right Antecubital 3 days         Midline Catheter Insertion/Assessment  - Single Lumen 01/22/24 1707 Right basilic vein (medial side of arm) 18g x 10cm <1 day                    Significant Labs:    CBC/Anemia Profile:  Recent Labs   Lab 01/22/24  0242 01/23/24  0500   WBC 15.20* 13.08*   HGB 8.5* 7.4*   HCT 27.1* 23.1*    207   MCV 94 92   RDW 21.5* 21.7*        Chemistries:  Recent Labs   Lab 01/22/24  0242 01/22/24  1232 01/23/24  0500     --  136   K 4.5  --  4.0     --  103   CO2 20*  --  26   BUN 47*  --  34*   CREATININE 3.7*  --  2.7*   CALCIUM 8.4*  --  7.8*   ALBUMIN 1.8*  --  1.7*   PROT 5.1*  --  4.7*   BILITOT 0.5  --  0.3   ALKPHOS 95  --  93   ALT 7*  --  5*   AST 13  --  13   MG  --  2.2 1.9   PHOS  --  2.6* 2.4*       All pertinent labs within the past 24 hours have been reviewed.    Significant Imaging:  I have reviewed all pertinent imaging results/findings within the past 24 hours.  Assessment/Plan:     Cardiac/Vascular  Atrial fibrillation  72 yo female with 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 Stepped up to the unit for persistent afib rvr. Had SBO (now resolved). 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  Pacemaker placed on 01/16. R Chest tube placed w IR on 1/17. L sided chest tube placed w IR on 1/18. CXR stable. Pleural cultures NGTD.      Neuro/Psych:   -- Sedation: none  -- Pain: Lidocaine patch   -- has anxiety, not on medications at home   - started on venlafaxine 37.5 mg QD     Cards:   -- BP goals SBP >90, MAPs >55   - Midodrine 5mg BID   - Midodrine 15mg additional with HD  -- Afib RVR, last occurred on 1/19/24 after HD   - Now back in NSR   - continue  metoprolol tartrate 12.5 mg BID, hold if soft BP   - continue heparin gtt for anticoagulation   - prn iv metoprolol for sustained RVR > 130's   - If IV Metop fails or if hypotensive start Amio  -- Asystolic arrest, now s/p PPM 1/16 rate set to 60 bmp   - 1/12/24 patient asystole with 1 round of compressions and achievement of ROSC. Multiple bradycardic events prior to which had self resolved. STAT TVP placement unsuccessful 2/2 central venous stenosis. 2nd episode of asystole resolved after minimal compressions. 3rd episode of asystole resolved with singular compression.   - S/p TVP via groin in Cath lab 1/12, removed 1/16   - PPM 1/16 @ 60, heparin gtt held x 5 days   - high digoxin level now s/p digibind 1/12 and converted to NSR after administration  - dig level now WNL x 2     Pulm:   -- Goal O2 sat > 90%   - satting 100% on RA  -- Surgical chest tube d/c'd 1/8/24  -- CXR with pleural effusions and RLL opacification  -- CT chest (1/13): R worse than L pleural effusion, pericardial effusion   -- IR placed bilateral chest tubes for pleural effusions   -- R CT placed 1/17   -- L CT placed 1/18  -- continue incentive spirometry      Renal:  -- history of ESRD, dialysis dependent   - HD per nephrology, additional midodrine 15mg On Call PRN to be administered prior to HD   - AVF okay to use per vascular  -- trend BUN/Cr  -- holding sevelamer in setting of poor PO intake and low daily phos  -- patient is anuric vs oliguric  -- permacath in place last exchanged 10/2023  -- oxybutynin to reduce bladder spasm possibly triggering eulogio episodes    Recent Labs   Lab 01/21/24  0236 01/22/24  0242 01/23/24  0500   BUN 34* 47* 34*   CREATININE 2.5* 3.7* 2.7*           FEN / GI:   -- CT on 1/8/24 demonstrating SBO, now resolved  -- US on 1/8/24 showing distended gallbladder without signs of acute cholecystitis   - NGT removed  - Equivocal GGC 1/10  -- PPI  -- Replace lytes as needed  -- Nutrition: diabetic/renal/gluten free  soft and bite sized diet with novasource daily  - Dietician consulted for complex dietary restriction  - home appetite stimulant dronabinol 5 mg BID  -- Bowel regiment: miralax and doc-senna      ID:   -- Tm: afebrile; WBC down trending  -- C/f HAP with RLL consolidation, ID following  -- MRSA neg, dc vanc  -- zosyn broadened to meropenem given pleural effusions, completed Merrem on 1/20/24  -- Start ceftriaxone 2 gm IV daily plus PO metronidazole 500 mg BID on 1/21/24  -- Transitioned ceftriaxone to cefpodoxime (renally dosed)   -- Continue flagyl 500 mg BID and cefpodoxime 200 mg QHS for 4 weeks (end date: 2/15/24)  -- blood cultures with 1 bottle growing staph epi, likely a contaminant  -- pleural cultures (1/18): NGTD    Recent Labs   Lab 01/21/24  0236 01/22/24  0242 01/23/24  0500   WBC 17.79* 15.20* 13.08*           Heme/Onc:  -- Daily CBC  -- heparin gtt for A-fib, held 5 days after PPM 1/16, restarted 1/22  -- S/p 1 unit pRBC's on 1/20/24 for Hb 6.8    Recent Labs   Lab 01/21/24  0236 01/22/24  0242 01/22/24  0811 01/22/24  1536 01/22/24  2128 01/23/24  0500   HGB 8.8* 8.5*  --   --   --  7.4*    209  --   --   --  207   APTT  --   --  22.9 49.1* 22.6 66.2*   INR  --   --  1.1  --   --   --            Endo:   -- history of diabetes  -- Gluc goal 140-180  -- detemir 6u QD  -- SSI      PPx:   Feeding: diabetic/renal/gluten diet and novasource  Analgesia/Sedation: Pain is controlled, no sedation  Thromboembolic prevention: heparin gtt  HOB >30: yes  Stress Ulcer ppx: pantoprazole  Glucose control: Critical care goal 140-180 g/dl, detemir and SSI  Bowel reg: miralax and doc-senna  Invasive Lines/Drains/Airway: Permacath, Bilateral chest tubes, PIV x2  Deescalation: transition HD to AVF instead of permacath        Dispo/Code Status/Palliative:   -- Full Code  -- Continue ICU care then SD tmrw  -- Patient needs to get out of bed and move, appreciate nursing, and PT and OT assistance/help in this matter            Gissel Jack MD  Critical Care - Surgery  [facility]

## 2024-01-23 NOTE — SUBJECTIVE & OBJECTIVE
Interval History:   NAEON. HD yesterday evening, midodrine PRN given prior to [id number]., She tolerated it fairly well - they werte able to pull 300 off (up from 0 prior). No significant hypotension or HR disturbances during session. Feeling okay this am. Chest tube output low following HD.   [facility] AVF cleared for use       Medications:  Continuous Infusions:   heparin (porcine) in D5W 15 Units/kg/hr (01/23/24 0500)       Scheduled Meds:   cefpodoxime  200 mg Oral QHS    droNABinol  5 mg Oral BID    epoetin noble (PROCRIT) injection  4,000 Units Intravenous Every Mon, Wed, Fri    insulin detemir U-100  6 Units Subcutaneous Daily    LIDOcaine  1 patch Transdermal Q24H    metoprolol tartrate  12.5 mg Oral BID    metroNIDAZOLE  500 mg Oral Q12H    midodrine  5 mg Oral BID WM    oxybutynin  5 mg Oral TID    pantoprazole  40 mg Oral Daily    polyethylene glycol  17 g Oral Daily    senna-docusate 8.6-50 mg  1 tablet Oral Daily    sodium chloride 0.9%  10 mL Intravenous Q6H    venlafaxine  37.5 mg Oral Daily     PRN Meds:sodium chloride 0.9%, acetaminophen, albuterol sulfate, bisacodyL, dextrose 10%, dextrose 10%, glucagon (human recombinant), glucose, glucose, heparin (porcine), heparin (PORCINE), heparin (PORCINE), hydrALAZINE, insulin aspart U-100, midodrine, ondansetron, sodium chloride 0.9%, Flushing PICC/Midline Protocol **AND** sodium chloride 0.9% **AND** sodium chloride 0.9%     Review of patient's allergies indicates:   Allergen Reactions    Crestor [rosuvastatin] Swelling    Gluten protein      Objective:     Vital Signs (Most Recent):  Temp: 97.5 °F (36.4 °C) (01/23/24 0430)  Pulse: 90 (01/23/24 0535)  Resp: 15 (01/23/24 0430)  BP: 100/61 (01/23/24 0430)  SpO2: 100 % (01/23/24 0430) Vital Signs (24h Range):  Temp:  [97.4 °F (36.3 °C)-98.2 °F (36.8 °C)] 97.5 °F (36.4 °C)  Pulse:  [77-97] 90  Resp:  [9-44] 15  SpO2:  [95 %-100 %] 100 %  BP: ()/(52-76) 100/61     Intake/Output - Last 3 Shifts         01/21  0700  01/22 0659 01/22 0700  01/23 0659 01/23 0700  01/24 0659    P.O. 300 720     I.V. (mL/kg) 14.8 (0.2) 201.1 (3)     Other  600     IV Piggyback 99.7 132.7     Total Intake(mL/kg) 414.5 (6.1) 1653.7 (24.4)     Other  900     Stool 0      Chest Tube 630 820     Total Output 630 1720     Net -215.5 -66.3            Stool Occurrence 2 x              SpO2: 100 %        Physical Exam  Vitals and nursing note reviewed.   Constitutional:       Appearance: Normal appearance.   HENT:      Head: Normocephalic and atraumatic.      Nose: Nose normal.   Cardiovascular:      Rate and Rhythm: Normal rate and regular rhythm.      Pulses: Normal pulses.   Pulmonary:      Effort: Pulmonary effort is normal. No respiratory distress.      Comments: Right posterior chest incision c/d/I    Right chest permacath    Right Chest tube, 10 fr, serosanguinous output, waterseal   Left chest tube, 10 fr, serosanguinous output, waterseal   Abdominal:      General: Abdomen is flat. There is no distension.      Palpations: Abdomen is soft.      Tenderness: There is no abdominal tenderness.      Comments: Soft, nontender abdomen   Musculoskeletal:      Right lower leg: Edema present.      Left lower leg: Edema present.   Skin:     General: Skin is warm and dry.      Coloration: Skin is pale.      Comments: Groin soft, dressings c/d/i   Neurological:      General: No focal deficit present.      Mental Status: She is alert.            Significant Labs:  CBC:   Recent Labs   Lab 01/23/24  0500   WBC 13.08*   RBC 2.51*   HGB 7.4*   HCT 23.1*      MCV 92   MCH 29.5   MCHC 32.0         Significant Diagnostics:  I have reviewed and interpreted all pertinent imaging results/findings within the past 24 hours.    VTE Risk Mitigation (From admission, onward)           Ordered     heparin 25,000 units in dextrose 5% (100 units/ml) IV bolus from bag - ADDITIONAL PRN BOLUS - 60 units/kg  As needed (PRN)        Question:  Heparin Infusion Adjustment (DO  NOT MODIFY ANSWER)  Answer:  \\[email or web address]\epic\Images\Pharmacy\HeparinInfusions\heparin LOW INTENSITY nomogram for [facility] [id number]    01/22/24 0740     heparin 25,000 units in dextrose 5% (100 units/ml) IV bolus from bag - ADDITIONAL PRN BOLUS - 30 units/kg  As needed (PRN)        Question:  Heparin Infusion Adjustment (DO NOT MODIFY ANSWER)  Answer:  \\[email or web address]\epic\Images\Pharmacy\HeparinInfusions\heparin LOW INTENSITY nomogram for [facility] [id number]    01/22/24 0740     heparin 25,000 units in dextrose 5% 250 mL (100 units/mL) infusion LOW INTENSITY nomogram - [facility]  Continuous        Question:  Begin at (units/kg/hr)  Answer:  12    01/22/24 0740     heparin 25,000 units in dextrose 5% 250 mL (100 units/mL) infusion LOW INTENSITY nomogram - [facility]  Continuous        Question:  Begin at (units/kg/hr)  Answer:  16    01/15/24 1038     heparin (porcine) injection 1,000 Units  As needed (PRN)         01/11/24 1351     heparin (porcine) injection 1,000 Units  As needed (PRN)         01/03/24 1123     IP VTE HIGH RISK PATIENT  Once         01/02/24 1226     Place CARMELITA hose  Until discontinued         01/02/24 1226     Place sequential compression device  Until discontinued         01/02/24 1226

## 2024-01-23 NOTE — PROGRESS NOTES
01/22/24 2130   Vital Signs   Temp 97.6 °F (36.4 °C)   Temp Source Oral   Pulse 80   Resp 14   SpO2 100 %   Pulse Oximetry Type Continuous   Device (Oxygen Therapy) room air   /60   MAP (mmHg) 86   BP Location Right arm   BP Method Automatic   Patient Position Lying     Bedside HD 1:1 tx initiated aseptically via RIJ TDC without issues.  Midodrine was given 1hr prior to the tx.  Pt is alert and stable for HD..

## 2024-01-23 NOTE — NURSING
syncopal episode while trying to use the bedside commode. MD notified. Patient transfer back to bed. Placed on a non-rebreather mask.

## 2024-01-23 NOTE — ASSESSMENT & PLAN NOTE
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 Stepped up to the unit for persistent afib rvr. Had SBO (now resolved). 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  Pacemaker placed on 01/16. R Chest tube placed w IR on 1/17. L sided chest tube placed w IR on 1/18. CXR stable. Pleural cultures NGTD.      Neuro/Psych:   -- Sedation: none  -- Pain: Lidocaine patch   -- has anxiety, not on medications at home   - started on venlafaxine 37.5 mg QD     Cards:   -- BP goals SBP >90, MAPs >55   - Midodrine 5mg BID   - Midodrine 15mg additional with HD  -- Afib RVR, last occurred on 1/19/24 after HD   - Now back in NSR   - continue metoprolol tartrate 12.5 mg BID, hold if soft BP   - continue heparin gtt for anticoagulation   - prn iv metoprolol for sustained RVR > 130's   - If IV Metop fails or if hypotensive start Amio  -- Asystolic arrest, now s/p PPM 1/16 rate set to 60 bmp   - 1/12/24 patient asystole with 1 round of compressions and achievement of ROSC. Multiple bradycardic events prior to which had self resolved. STAT TVP placement unsuccessful 2/2 central venous stenosis. 2nd episode of asystole resolved after minimal compressions. 3rd episode of asystole resolved with singular compression.   - S/p TVP via groin in Cath lab 1/12, removed 1/16   - PPM 1/16 @ 60, heparin gtt held x 5 days   - high digoxin level now s/p digibind 1/12 and converted to NSR after administration  - dig level now WNL x 2     Pulm:   -- Goal O2 sat > 90%   - satting 100% on RA  -- Surgical chest tube d/c'd 1/8/24  -- CXR with pleural effusions and RLL opacification  -- CT chest (1/13): R worse than L pleural effusion, pericardial effusion   -- IR placed bilateral chest tubes for pleural effusions   -- R CT placed 1/17   -- L CT placed 1/18  -- continue incentive spirometry      Renal:  -- history of ESRD,  dialysis dependent   - HD per nephrology, additional midodrine 15mg On Call PRN to be administered prior to HD   - AVF okay to use per vascular  -- trend BUN/Cr  -- holding sevelamer in setting of poor PO intake and low daily phos  -- patient is anuric vs oliguric  -- permacath in place last exchanged 10/2023  -- oxybutynin to reduce bladder spasm possibly triggering eulogio episodes    Recent Labs   Lab 01/21/24  0236 01/22/24  0242 01/23/24  0500   BUN 34* 47* 34*   CREATININE 2.5* 3.7* 2.7*           FEN / GI:   -- CT on 1/8/24 demonstrating SBO, now resolved  -- US on 1/8/24 showing distended gallbladder without signs of acute cholecystitis   - NGT removed  - Equivocal GGC 1/10  -- PPI  -- Replace lytes as needed  -- Nutrition: diabetic/renal/gluten free soft and bite sized diet with novasource daily  - Dietician consulted for complex dietary restriction  - home appetite stimulant dronabinol 5 mg BID  -- Bowel regiment: miralax and doc-senna      ID:   -- Tm: afebrile; WBC down trending  -- C/f HAP with RLL consolidation, ID following  -- MRSA neg, dc vanc  -- zosyn broadened to meropenem given pleural effusions, completed Merrem on 1/20/24  -- Start ceftriaxone 2 gm IV daily plus PO metronidazole 500 mg BID on 1/21/24  -- Transitioned ceftriaxone to cefpodoxime (renally dosed)   -- Continue flagyl 500 mg BID and cefpodoxime 200 mg QHS for 4 weeks (end date: 2/15/24)  -- blood cultures with 1 bottle growing staph epi, likely a contaminant  -- pleural cultures (1/18): NGTD    Recent Labs   Lab 01/21/24  0236 01/22/24  0242 01/23/24  0500   WBC 17.79* 15.20* 13.08*           Heme/Onc:  -- Daily CBC  -- heparin gtt for A-fib, held 5 days after PPM 1/16, restarted 1/22  -- S/p 1 unit pRBC's on 1/20/24 for Hb 6.8    Recent Labs   Lab 01/21/24  0236 01/22/24  0242 01/22/24  0811 01/22/24  1536 01/22/24  2128 01/23/24  0500   HGB 8.8* 8.5*  --   --   --  7.4*    209  --   --   --  207   APTT  --   --  22.9 49.1*  22.6 66.2*   INR  --   --  1.1  --   --   --            Endo:   -- history of diabetes  -- Gluc goal 140-180  -- detemir 6u QD  -- SSI      PPx:   Feeding: diabetic/renal/gluten diet and novasource  Analgesia/Sedation: Pain is controlled, no sedation  Thromboembolic prevention: heparin gtt  HOB >30: yes  Stress Ulcer ppx: pantoprazole  Glucose control: Critical care goal 140-180 g/dl, detemir and SSI  Bowel reg: miralax and doc-senna  Invasive Lines/Drains/Airway: Permacath, Bilateral chest tubes, PIV x2  Deescalation: transition HD to AVF instead of permacath        Dispo/Code Status/Palliative:   -- Full Code  -- Continue ICU care then SD tmrw  -- Patient needs to get out of bed and move, appreciate nursing, and PT and OT assistance/help in this matter

## 2024-01-23 NOTE — PT/OT/SLP PROGRESS
Physical Therapy Treatment    Patient Name:  Lily Green   MRN:  [id number]  Admit Date: 1/2/2024  Admitting Diagnosis:  Malignant carcinoid tumor of bronchus   Length of Stay: 21 days  Recent Surgery: Procedure(s) (LRB):  INSERTION, CARDIAC PACEMAKER, LEADLESS (N/A) 7 Days Post-Op    Recommendations:     Discharge Recommendations:  Moderate Intensity Therapy   Discharge Equipment Recommendations: to be determined by next level of care     Plan:     During this hospitalization, patient to be seen 4 x/week to address the listed problems via therapeutic activities, gait training, therapeutic exercises, neuromuscular re-education  Plan of Care Expires:  02/09/24  Plan of Care Reviewed with: patient, caregiver    Assessment:     Lily Green is a 73 y.o. female admitted with a medical diagnosis of Malignant carcinoid tumor of bronchus. Pt limited by severe anxiety. Encouragement and support provided.  Pt progressing towards goals, but not at PLOF. Pt tolerated session poorly due to severe anxiety.  Pt is improving with therapy evidenced by increased gait distance. Pt would benefit from continued acute PT to maximize functional mobility after surgical intervention. Patient currently demonstrates a need for Moderate Intensity Therapy on a daily basis post acute secondary to a decline in functional status due to surgical procedure           Problem List: weakness, impaired endurance, impaired functional mobility, gait instability, impaired balance, impaired cardiopulmonary response to activity.  Rehab Prognosis: Good     GOALS:   Multidisciplinary Problems       Physical Therapy Goals          Problem: Physical Therapy    Goal Priority Disciplines Outcome Goal Variances Interventions   Physical Therapy Goal     PT, PT/OT Ongoing, Progressing     Description: PT goals until 2/9/24    1. Pt supine to sit with supervision-not met  2. Pt sit to supine with supervision-not met  3. Pt sit to stand with AD  "if needed with supervision-not met  4. Pt to perform gait 150 ft with AD if needed with supervision.-not met  5. Pt to go up/down curb step with AD if needed with CGA.-not met                         Subjective   Communicated with RN prior to session.  Patient found up in chair upon PT entry to room, agreeable to evaluation. Lily Green's caregiver present during session.    Chief Complaint: anxiety  Patient/Family Comments/goals: to get better   Pain/Comfort:  Pain Rating 1: 0/10  Pain Rating Post-Intervention 1: 0/10    Objective:   Patient found with: telemetry, pulse ox (continuous), blood pressure cuff, central line, peripheral IV, chest tube   General Precautions: Standard, Cardiac fall   Orthopedic Precautions:N/A   Braces: N/A   Oxygen Device: Room Air  Vitals: /62   Pulse 97   Temp 97.5 °F (36.4 °C) (Oral)   Resp (!) 24   Ht 5' 5" (1.651 m)   Wt 67.8 kg (149 lb 8 oz)   LMP  (LMP Unknown) Comment: BEAU/ NO BSO  1986  SpO2 96%   Breastfeeding No   BMI 24.88 kg/m²     Outcome Measures:  AM-PAC 6 CLICK MOBILITY  Turning over in bed (including adjusting bedclothes, sheets and blankets)?: 2  Sitting down on and standing up from a chair with arms (e.g., wheelchair, bedside commode, etc.): 2  Moving from lying on back to sitting on the side of the bed?: 2  Moving to and from a bed to a chair (including a wheelchair)?: 2  Need to walk in hospital room?: 2  Climbing 3-5 steps with a railing?: 1  Basic Mobility Total Score: 11       Functional Mobility:  Additional staff present: OT - due to pt requiring 2 skilled therapists to safely perform functional mobility and to accommodate pt's activity tolerance    Bed Mobility:   Not performed 2nd to pt found in chair     Transfers:   Sit <> Stand Transfer:   Trial 1: min A x 2 persons using RW from chair  Trial 1 ended 2nd to pt forcefully bending over  Trial 2: mod a x 2 persons using RW from chair       Gait:  Patient ambulated: 5 steps forward  "   Patient required: moderate assist and of 2 persons  Patient used: rolling walker  Gait Pattern observed: reciprocal gait  Gait Deviation(s): unsteady gait, decreased step length, narrow base of support, and decreased darrell  Impairments due to: severe anxiety  Comments:   Pt unable to continue past 5 steps due to severe anxiety   External stability provided by writing therapist   Verbal cuing for upright posture and advancement of B LE   Pt demo'd good functional strength       Therapeutic Activities, Exercises, and Education:   Educated pt on PT role/POC  Educated pt on importance of OOB activity and daily ambulation   Pt verbalized understanding       Patient left up in chair with all lines intact, call button in reach, RN notified, and jess present..    Time Tracking:     PT Received On: 01/23/24  PT Start Time: 0832     PT Stop Time: 0846  PT Total Time (min): 14 min       Billable Minutes:   Gait Training 8    Treatment Type: Treatment  PT/PTA: PT

## 2024-01-23 NOTE — PROGRESS NOTES
01/23/24 0030   Vital Signs   Temp 97.5 °F (36.4 °C)   Temp Source Axillary   Pulse 84   Heart Rate Source Monitor   Resp 15   SpO2 100 %   Pulse Oximetry Type Continuous   Device (Oxygen Therapy) room air   BP 99/60   MAP (mmHg) 73   BP Location Right arm   BP Method Automatic   Patient Position Lying     HD tx completed and pt tolerated good.  BP in the middle and towards the end of the tx is in the 90's.  Unable to pull more fluids.  Net UF: 300ml.  Tx time: 3hrs  Pt is alert and stable.

## 2024-01-23 NOTE — CARE UPDATE
Vascular Ultrasound of LUE AVF performed yesterday, currently awaiting formal read by a Vascular attending before the results will populate in Epic. The US demonstrated fistula depth of 3-4 mm, diameter of 7 mm, and flow of 1800 cc/min; all excellent parameters for access.    - Ok to use AVF for HD  - Patient can follow up with her vascular team at [facility]  - Vascular Surgery to sign off  - Please call with any questions or change in status    Samir Randolph MD  General Surgery Resident, PGY-3

## 2024-01-23 NOTE — PLAN OF CARE
SICU PLAN OF CARE NOTE    Dx: Malignant carcinoid tumor of bronchus      Goals of Care: MAP > 55, SBP > 90, < 180      Vital Signs (last 12 hours):   Temp:  [97.4 °F (36.3 °C)-98.2 °F (36.8 °C)]   Pulse:  [79-96]   Resp:  [10-44]   BP: ()/(52-71)   SpO2:  [95 %-100 %]      Neuro: AAO x4, Follows Commands, and Moves All Extremities      Cardiac: NSR     Respiratory: Room Air     Gtts: None     Urine Output: Anuric     Dialysis: intermittent HD tonight, orders placed for Midodrine 15 mg to be given 1 hour to the start of HD     Drains:   R. Pleural Chest Tube, total output 540 cc /  shift  L. Pleural Chest Tube, total output 200 cc / shift     Diet: Diabetic, renal, gluten free, soft/bite sized bites     Labs/Accuchecks: Daily Labs ; Accuchecks ACHS     Skin:  No signs of skin breakdown or redness noted over bony prominences. Sacral and heel foam dressings in place. Patient repositioned Q2H and as needed. Immerse bed plugged in and working correctly     Shift Events:    No acute events throughout the shift. Patient up out of bed and in the chair for most of the shift.   Metoprolol PO and antidepressant given per orders. Midline placed. Plans for iHD tonight.    Plan of care reviewed with the patient and her family and all questions answered at this time.

## 2024-01-23 NOTE — PLAN OF CARE
Pt alert and a little withdrawn this shift.  NSR and Stable VS on cm.  Afebrile.  Has PPM.  On heparin drip which was increased once.  No signs of bleeding.  Held metoprolol at 2100 and gave midodrine 1 hour prior to HD which ran for 3 hours.  Pt very anxious during HD and required me to sit with for until her anxiety subsided.  She stated that she has been having problems that occur during HD.  Caregiver at bedside also.  She was started on antidepressant yesterday for situational stress and depression.  Accu checks monitored.  See MAR/results of lab.  Left and right chest tubes to water seal with minimal drainage.  Sites WNL.  Aseptic technique used this shift.  SR up and essential items/call light within reach.  Room in view of nurse.  Offered to give her a bath and change her linens at 0500.  She requested to sleep a few more hours since her HD ran late into the night.

## 2024-01-23 NOTE — ASSESSMENT & PLAN NOTE
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 Stepped up to the ICU 1/5 for afib RVR which was refractory to medical management and c/b development of tachy-eulogio syndrome now s/p micra placement 1/16 for tachy-eulogio syndrome in the setting of RVR. Did have brief SBO type picture 1/7 which resolved with conservative management.     - s/p R thoracotomy with R lower lobectomy for carcinoid w/ MLND; IPV injury requiring repair. CT removed post operatively.  - Refractory afib RVR requiring ICU step up 1/05, s/p chemical cardioversion initially with recurrence c/b developing tachy-eulogio syndrome    - cards and EP following, appreciate assistance    - AC indicated 2/2 intermittent atrial fib episodes and chadsvasc score of 2     - s/p TVP placement and now micra implantation 1/16    - Heparin gtt resumed 1/22; will need to transition to eliquis likely following CT removal   - CT chest 1/13 w/ bilateral pleural effusion (R>L), pericardial effusion,   - s/p R chest tube placement with IR 1/17, L chest tube placement 1/18; Keep today and may stay to waterseal; monitr output   - Daily CXR while CT are in place   - Pulmonary toilet: IS, Acapella, Mucomyst daily   - Infectious work up 2/2 leukocytosis. She remains afebrile. Effusions and compressive atelectasis also likely contributing to leukocytosis.Trended up to 30k 1/16, now downtrending. Zosyn 1/11-1/15; Meropenam 1/16 - 1/20; Plan for Ceftriaxone and metronidazole to start 1/21 per ID. 4 week abx plan for empiric treatment of presumed parapneumonic effusion     - ID following, appreciate assistance. Reconsult following thora results.   - Bcx 1/11: Staph, probable contaminant  - Bcx1/12: NGTD   - Rapid blood PCR: staph epi  - Sputum Cx 1/11: klebsiella & e. Coli  - R pleural fluid Cx  1/17: NGTD  - SBO type episode 1/07 requiring NGT decompression, which resolved with conservative management   - Renal, gluten free diet, novasource renal BID; marinol for appetite   - Bowel regimen, PPI  - Nephrology following, appreciate assistance. CRRT 1/17 overnight. HD trial 1/19 passed but requiring midodrine, 1.5 L LR, metoprolol for RVR. HD 1/22 overnight tolerated much better with midodrine given prior    - LUE AVF created 10.2023, US 1/22/2023 with good flow metrics, okay to use per vascular   - Oxybutinin for bladder spasms   - MM pain control as needed  - Continue PT/OT, mobilization, likely post hospital placement         Dispo: Okay for step down today

## 2024-01-24 ENCOUNTER — ANESTHESIA (OUTPATIENT)
Dept: ENDOSCOPY | Facility: HOSPITAL | Age: 74
DRG: 163 | End: 2024-01-24
Payer: MEDICARE

## 2024-01-24 PROBLEM — R55 SYNCOPE: Status: ACTIVE | Noted: 2024-01-24

## 2024-01-24 PROBLEM — K92.2 GIB (GASTROINTESTINAL BLEEDING): Status: ACTIVE | Noted: 2024-01-24

## 2024-01-24 LAB
ALBUMIN SERPL BCP-MCNC: 1.8 G/DL (ref 3.5–5.2)
ALP SERPL-CCNC: 75 U/L (ref 55–135)
ALT SERPL W/O P-5'-P-CCNC: 10 U/L (ref 10–44)
ANION GAP SERPL CALC-SCNC: 6 MMOL/L (ref 8–16)
AST SERPL-CCNC: 17 U/L (ref 10–40)
BASOPHILS # BLD AUTO: 0.02 K/UL (ref 0–0.2)
BASOPHILS # BLD AUTO: 0.03 K/UL (ref 0–0.2)
BASOPHILS # BLD AUTO: 0.03 K/UL (ref 0–0.2)
BASOPHILS NFR BLD: 0.1 % (ref 0–1.9)
BASOPHILS NFR BLD: 0.2 % (ref 0–1.9)
BASOPHILS NFR BLD: 0.2 % (ref 0–1.9)
BILIRUB SERPL-MCNC: 1 MG/DL (ref 0.1–1)
BUN SERPL-MCNC: 75 MG/DL (ref 8–23)
CALCIUM SERPL-MCNC: 7.9 MG/DL (ref 8.7–10.5)
CHLORIDE SERPL-SCNC: 106 MMOL/L (ref 95–110)
CO2 SERPL-SCNC: 23 MMOL/L (ref 23–29)
CREAT SERPL-MCNC: 3.3 MG/DL (ref 0.5–1.4)
DIFFERENTIAL METHOD BLD: ABNORMAL
EOSINOPHIL # BLD AUTO: 0 K/UL (ref 0–0.5)
EOSINOPHIL NFR BLD: 0 % (ref 0–8)
EOSINOPHIL NFR BLD: 0.1 % (ref 0–8)
EOSINOPHIL NFR BLD: 0.1 % (ref 0–8)
ERYTHROCYTE [DISTWIDTH] IN BLOOD BY AUTOMATED COUNT: 17.8 % (ref 11.5–14.5)
ERYTHROCYTE [DISTWIDTH] IN BLOOD BY AUTOMATED COUNT: 18.6 % (ref 11.5–14.5)
ERYTHROCYTE [DISTWIDTH] IN BLOOD BY AUTOMATED COUNT: 18.8 % (ref 11.5–14.5)
EST. GFR  (NO RACE VARIABLE): 14.2 ML/MIN/1.73 M^2
GLUCOSE SERPL-MCNC: 181 MG/DL (ref 70–110)
HCT VFR BLD AUTO: 23.8 % (ref 37–48.5)
HCT VFR BLD AUTO: 24.2 % (ref 37–48.5)
HCT VFR BLD AUTO: 24.8 % (ref 37–48.5)
HGB BLD-MCNC: 8 G/DL (ref 12–16)
HGB BLD-MCNC: 8.2 G/DL (ref 12–16)
HGB BLD-MCNC: 8.2 G/DL (ref 12–16)
IMM GRANULOCYTES # BLD AUTO: 0.28 K/UL (ref 0–0.04)
IMM GRANULOCYTES # BLD AUTO: 0.31 K/UL (ref 0–0.04)
IMM GRANULOCYTES # BLD AUTO: 0.33 K/UL (ref 0–0.04)
IMM GRANULOCYTES NFR BLD AUTO: 1.6 % (ref 0–0.5)
IMM GRANULOCYTES NFR BLD AUTO: 1.9 % (ref 0–0.5)
IMM GRANULOCYTES NFR BLD AUTO: 2 % (ref 0–0.5)
LYMPHOCYTES # BLD AUTO: 1.1 K/UL (ref 1–4.8)
LYMPHOCYTES # BLD AUTO: 1.3 K/UL (ref 1–4.8)
LYMPHOCYTES # BLD AUTO: 1.6 K/UL (ref 1–4.8)
LYMPHOCYTES NFR BLD: 7 % (ref 18–48)
LYMPHOCYTES NFR BLD: 7.3 % (ref 18–48)
LYMPHOCYTES NFR BLD: 9.8 % (ref 18–48)
MAGNESIUM SERPL-MCNC: 2 MG/DL (ref 1.6–2.6)
MCH RBC QN AUTO: 31.2 PG (ref 27–31)
MCH RBC QN AUTO: 31.5 PG (ref 27–31)
MCH RBC QN AUTO: 31.6 PG (ref 27–31)
MCHC RBC AUTO-ENTMCNC: 33.1 G/DL (ref 32–36)
MCHC RBC AUTO-ENTMCNC: 33.6 G/DL (ref 32–36)
MCHC RBC AUTO-ENTMCNC: 33.9 G/DL (ref 32–36)
MCV RBC AUTO: 93 FL (ref 82–98)
MCV RBC AUTO: 94 FL (ref 82–98)
MCV RBC AUTO: 94 FL (ref 82–98)
MONOCYTES # BLD AUTO: 1.8 K/UL (ref 0.3–1)
MONOCYTES # BLD AUTO: 1.9 K/UL (ref 0.3–1)
MONOCYTES # BLD AUTO: 2 K/UL (ref 0.3–1)
MONOCYTES NFR BLD: 10.6 % (ref 4–15)
MONOCYTES NFR BLD: 11.5 % (ref 4–15)
MONOCYTES NFR BLD: 12.3 % (ref 4–15)
NEUTROPHILS # BLD AUTO: 12.5 K/UL (ref 1.8–7.7)
NEUTROPHILS # BLD AUTO: 12.9 K/UL (ref 1.8–7.7)
NEUTROPHILS # BLD AUTO: 13.8 K/UL (ref 1.8–7.7)
NEUTROPHILS NFR BLD: 75.7 % (ref 38–73)
NEUTROPHILS NFR BLD: 79.4 % (ref 38–73)
NEUTROPHILS NFR BLD: 80.2 % (ref 38–73)
NRBC BLD-RTO: 0 /100 WBC
PHOSPHATE SERPL-MCNC: 2.7 MG/DL (ref 2.7–4.5)
PLATELET # BLD AUTO: 165 K/UL (ref 150–450)
PLATELET # BLD AUTO: 194 K/UL (ref 150–450)
PLATELET # BLD AUTO: 199 K/UL (ref 150–450)
PMV BLD AUTO: 11.9 FL (ref 9.2–12.9)
PMV BLD AUTO: 11.9 FL (ref 9.2–12.9)
PMV BLD AUTO: 12 FL (ref 9.2–12.9)
POCT GLUCOSE: 122 MG/DL (ref 70–110)
POCT GLUCOSE: 133 MG/DL (ref 70–110)
POCT GLUCOSE: 170 MG/DL (ref 70–110)
POCT GLUCOSE: 191 MG/DL (ref 70–110)
POCT GLUCOSE: 88 MG/DL (ref 70–110)
POTASSIUM SERPL-SCNC: 4.4 MMOL/L (ref 3.5–5.1)
PROT SERPL-MCNC: 4.4 G/DL (ref 6–8.4)
RBC # BLD AUTO: 2.53 M/UL (ref 4–5.4)
RBC # BLD AUTO: 2.6 M/UL (ref 4–5.4)
RBC # BLD AUTO: 2.63 M/UL (ref 4–5.4)
SODIUM SERPL-SCNC: 135 MMOL/L (ref 136–145)
WBC # BLD AUTO: 16.26 K/UL (ref 3.9–12.7)
WBC # BLD AUTO: 16.53 K/UL (ref 3.9–12.7)
WBC # BLD AUTO: 17.21 K/UL (ref 3.9–12.7)

## 2024-01-24 PROCEDURE — 99291 CRITICAL CARE FIRST HOUR: CPT | Mod: FS,,, | Performed by: ANESTHESIOLOGY

## 2024-01-24 PROCEDURE — 37000008 HC ANESTHESIA 1ST 15 MINUTES: Performed by: STUDENT IN AN ORGANIZED HEALTH CARE EDUCATION/TRAINING PROGRAM

## 2024-01-24 PROCEDURE — 63600175 PHARM REV CODE 636 W HCPCS: Performed by: NURSE ANESTHETIST, CERTIFIED REGISTERED

## 2024-01-24 PROCEDURE — 94761 N-INVAS EAR/PLS OXIMETRY MLT: CPT

## 2024-01-24 PROCEDURE — 84100 ASSAY OF PHOSPHORUS: CPT

## 2024-01-24 PROCEDURE — 80100014 HC HEMODIALYSIS 1:1

## 2024-01-24 PROCEDURE — 25000003 PHARM REV CODE 250: Performed by: SURGERY

## 2024-01-24 PROCEDURE — 63600175 PHARM REV CODE 636 W HCPCS

## 2024-01-24 PROCEDURE — 99232 SBSQ HOSP IP/OBS MODERATE 35: CPT | Mod: FS,,, | Performed by: INTERNAL MEDICINE

## 2024-01-24 PROCEDURE — 25000003 PHARM REV CODE 250: Performed by: NURSE ANESTHETIST, CERTIFIED REGISTERED

## 2024-01-24 PROCEDURE — 11000001 HC ACUTE MED/SURG PRIVATE ROOM

## 2024-01-24 PROCEDURE — 99232 SBSQ HOSP IP/OBS MODERATE 35: CPT | Mod: ,,, | Performed by: NURSE PRACTITIONER

## 2024-01-24 PROCEDURE — 83735 ASSAY OF MAGNESIUM: CPT

## 2024-01-24 PROCEDURE — A4216 STERILE WATER/SALINE, 10 ML: HCPCS | Performed by: STUDENT IN AN ORGANIZED HEALTH CARE EDUCATION/TRAINING PROGRAM

## 2024-01-24 PROCEDURE — 94799 UNLISTED PULMONARY SVC/PX: CPT | Mod: XB

## 2024-01-24 PROCEDURE — D9220A PRA ANESTHESIA: Mod: CRNA,,, | Performed by: NURSE ANESTHETIST, CERTIFIED REGISTERED

## 2024-01-24 PROCEDURE — 27000221 HC OXYGEN, UP TO 24 HOURS

## 2024-01-24 PROCEDURE — 43235 EGD DIAGNOSTIC BRUSH WASH: CPT | Mod: 74 | Performed by: STUDENT IN AN ORGANIZED HEALTH CARE EDUCATION/TRAINING PROGRAM

## 2024-01-24 PROCEDURE — D9220A PRA ANESTHESIA: Mod: ANES,,, | Performed by: ANESTHESIOLOGY

## 2024-01-24 PROCEDURE — 63600175 PHARM REV CODE 636 W HCPCS: Performed by: NURSE PRACTITIONER

## 2024-01-24 PROCEDURE — 43235 EGD DIAGNOSTIC BRUSH WASH: CPT | Mod: 52,GC,, | Performed by: STUDENT IN AN ORGANIZED HEALTH CARE EDUCATION/TRAINING PROGRAM

## 2024-01-24 PROCEDURE — 85025 COMPLETE CBC W/AUTO DIFF WBC: CPT | Mod: 91

## 2024-01-24 PROCEDURE — 63600175 PHARM REV CODE 636 W HCPCS: Mod: JZ | Performed by: NURSE PRACTITIONER

## 2024-01-24 PROCEDURE — 99223 1ST HOSP IP/OBS HIGH 75: CPT | Mod: 25,GC,, | Performed by: STUDENT IN AN ORGANIZED HEALTH CARE EDUCATION/TRAINING PROGRAM

## 2024-01-24 PROCEDURE — 25000003 PHARM REV CODE 250: Performed by: STUDENT IN AN ORGANIZED HEALTH CARE EDUCATION/TRAINING PROGRAM

## 2024-01-24 PROCEDURE — 25000003 PHARM REV CODE 250

## 2024-01-24 PROCEDURE — 37000009 HC ANESTHESIA EA ADD 15 MINS: Performed by: STUDENT IN AN ORGANIZED HEALTH CARE EDUCATION/TRAINING PROGRAM

## 2024-01-24 PROCEDURE — 99292 CRITICAL CARE ADDL 30 MIN: CPT | Mod: FS,,, | Performed by: ANESTHESIOLOGY

## 2024-01-24 PROCEDURE — 80053 COMPREHEN METABOLIC PANEL: CPT | Performed by: STUDENT IN AN ORGANIZED HEALTH CARE EDUCATION/TRAINING PROGRAM

## 2024-01-24 PROCEDURE — 85025 COMPLETE CBC W/AUTO DIFF WBC: CPT | Performed by: STUDENT IN AN ORGANIZED HEALTH CARE EDUCATION/TRAINING PROGRAM

## 2024-01-24 PROCEDURE — C9113 INJ PANTOPRAZOLE SODIUM, VIA: HCPCS

## 2024-01-24 PROCEDURE — 25000003 PHARM REV CODE 250: Performed by: NURSE PRACTITIONER

## 2024-01-24 PROCEDURE — 0DJ08ZZ INSPECTION OF UPPER INTESTINAL TRACT, VIA NATURAL OR ARTIFICIAL OPENING ENDOSCOPIC: ICD-10-PCS | Performed by: STUDENT IN AN ORGANIZED HEALTH CARE EDUCATION/TRAINING PROGRAM

## 2024-01-24 PROCEDURE — 99900035 HC TECH TIME PER 15 MIN (STAT)

## 2024-01-24 RX ORDER — NYSTATIN 100000 [USP'U]/ML
500000 SUSPENSION ORAL
Status: DISCONTINUED | OUTPATIENT
Start: 2024-01-24 | End: 2024-01-25

## 2024-01-24 RX ORDER — LIDOCAINE HYDROCHLORIDE 20 MG/ML
INJECTION INTRAVENOUS
Status: DISCONTINUED | OUTPATIENT
Start: 2024-01-24 | End: 2024-01-24

## 2024-01-24 RX ORDER — PROPOFOL 10 MG/ML
VIAL (ML) INTRAVENOUS
Status: DISCONTINUED | OUTPATIENT
Start: 2024-01-24 | End: 2024-01-24

## 2024-01-24 RX ORDER — ERYTHROMYCIN ETHYLSUCCINATE 200 MG/5ML
200 SUSPENSION ORAL
Status: DISCONTINUED | OUTPATIENT
Start: 2024-01-24 | End: 2024-02-06 | Stop reason: HOSPADM

## 2024-01-24 RX ORDER — SODIUM CHLORIDE 9 MG/ML
INJECTION, SOLUTION INTRAVENOUS ONCE
Status: COMPLETED | OUTPATIENT
Start: 2024-01-24 | End: 2024-01-24

## 2024-01-24 RX ADMIN — NYSTATIN 500000 UNITS: 100000 SUSPENSION ORAL at 04:01

## 2024-01-24 RX ADMIN — LIDOCAINE HYDROCHLORIDE 60 MG: 20 INJECTION INTRAVENOUS at 02:01

## 2024-01-24 RX ADMIN — Medication 10 ML: at 12:01

## 2024-01-24 RX ADMIN — METRONIDAZOLE 500 MG: 500 TABLET ORAL at 09:01

## 2024-01-24 RX ADMIN — NYSTATIN 500000 UNITS: 100000 SUSPENSION ORAL at 08:01

## 2024-01-24 RX ADMIN — MIDODRINE HYDROCHLORIDE 10 MG: 5 TABLET ORAL at 07:01

## 2024-01-24 RX ADMIN — METOPROLOL TARTRATE 12.5 MG: 25 TABLET, FILM COATED ORAL at 09:01

## 2024-01-24 RX ADMIN — INSULIN ASPART 2 UNITS: 100 INJECTION, SOLUTION INTRAVENOUS; SUBCUTANEOUS at 07:01

## 2024-01-24 RX ADMIN — SODIUM CHLORIDE: 9 INJECTION, SOLUTION INTRAVENOUS at 06:01

## 2024-01-24 RX ADMIN — ONDANSETRON 8 MG: 2 INJECTION INTRAMUSCULAR; INTRAVENOUS at 05:01

## 2024-01-24 RX ADMIN — OXYBUTYNIN CHLORIDE 5 MG: 5 TABLET ORAL at 09:01

## 2024-01-24 RX ADMIN — DRONABINOL 5 MG: 2.5 CAPSULE ORAL at 08:01

## 2024-01-24 RX ADMIN — ERYTHROMYCIN ETHYLSUCCINATE 200 MG: 200 SUSPENSION ORAL at 06:01

## 2024-01-24 RX ADMIN — Medication 10 ML: at 06:01

## 2024-01-24 RX ADMIN — PROPOFOL 60 MG: 10 INJECTION, EMULSION INTRAVENOUS at 02:01

## 2024-01-24 RX ADMIN — OXYBUTYNIN CHLORIDE 5 MG: 5 TABLET ORAL at 08:01

## 2024-01-24 RX ADMIN — MIDODRINE HYDROCHLORIDE 15 MG: 5 TABLET ORAL at 04:01

## 2024-01-24 RX ADMIN — LIDOCAINE 5% 1 PATCH: 700 PATCH TOPICAL at 09:01

## 2024-01-24 RX ADMIN — INSULIN ASPART 2 UNITS: 100 INJECTION, SOLUTION INTRAVENOUS; SUBCUTANEOUS at 11:01

## 2024-01-24 RX ADMIN — OXYBUTYNIN CHLORIDE 5 MG: 5 TABLET ORAL at 03:01

## 2024-01-24 RX ADMIN — CEFPODOXIME PROXETIL 200 MG: 200 TABLET, FILM COATED ORAL at 08:01

## 2024-01-24 RX ADMIN — PANTOPRAZOLE SODIUM 40 MG: 40 INJECTION, POWDER, FOR SOLUTION INTRAVENOUS at 08:01

## 2024-01-24 RX ADMIN — ERYTHROMYCIN ETHYLSUCCINATE 200 MG: 200 SUSPENSION ORAL at 08:01

## 2024-01-24 RX ADMIN — METRONIDAZOLE 500 MG: 500 TABLET ORAL at 08:01

## 2024-01-24 RX ADMIN — ERYTHROPOIETIN 4000 UNITS: 4000 INJECTION, SOLUTION INTRAVENOUS; SUBCUTANEOUS at 11:01

## 2024-01-24 RX ADMIN — SODIUM CHLORIDE: 0.9 INJECTION, SOLUTION INTRAVENOUS at 02:01

## 2024-01-24 RX ADMIN — VENLAFAXINE 37.5 MG: 37.5 TABLET ORAL at 09:01

## 2024-01-24 RX ADMIN — INSULIN ASPART 3 UNITS: 100 INJECTION, SOLUTION INTRAVENOUS; SUBCUTANEOUS at 04:01

## 2024-01-24 RX ADMIN — METOPROLOL TARTRATE 12.5 MG: 25 TABLET, FILM COATED ORAL at 08:01

## 2024-01-24 RX ADMIN — PANTOPRAZOLE SODIUM 40 MG: 40 INJECTION, POWDER, FOR SOLUTION INTRAVENOUS at 09:01

## 2024-01-24 RX ADMIN — DRONABINOL 5 MG: 2.5 CAPSULE ORAL at 09:01

## 2024-01-24 NOTE — CONSULTS
[facility]  Gastroenterology  Consult Note    Patient Name: Lily Green  MRN: [id number]  Admission Date: 1/2/2024  Hospital Length of Stay: 22 days  Code Status: Full Code   Attending Provider: Tan Thomson MD   Consulting Provider: Nadia Herring MD  Primary Care Physician: Pavel Calixto MD  Principal Problem:Malignant carcinoid tumor of bronchus    Inpatient consult to Gastroenterology  Consult performed by: Nadia Herring MD  Consult ordered by: Gissel Jack MD        Subjective:     HPI:  Lily Green is a 73 year old female for whom GI is consulted with concerns for GIB. 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     She initially presented to [facility] on 1/2/24 for further evaluation of her carcinoid tumor now s/p RML thoracotomy and resection. Post-operative course complicated by bilateral pleural effusion w/right chest tube placement 1/17, L chest tube placement 1/18, SBO that resolved with conservative management, refractory A-fib with tachy-eulogio syndrome s/p micra placement 1/16 started on low-intensity heparin gtt (resumed 1/22), sepsis treated with IV abx. On 1/23, she had a syncopal episode while having a bowel movement, thought to be vasovagal as well as hypotensive episodes with HD requiring midodrine. GI contacted on 1/23 after she had a large episode of black stools with associated dark clots, no bright red blood. Hgb 5.5 (previously 7.4 that morning), leukocytosis (15), INR 1.3, PTT 80.9, otherwise nml platelets and fibrinogen. She was HDS during episode of bleeding, was transfused 3  "U pRBCs, FFP and Protamine. CTA negative for active bleeding. Since the Protamine was administered, she has had no further episodes of bleeding and remains HDS. Repeat Hgb 8.2 this morning.     Most recent endoscopic history:  -Colonoscopy 2018: tortuous colon, stool throughout colon so nondiagnostic.   -Flex sig 2023 for "fistula of intestine": rectal biopsies with focally active proctitis.    Past Medical History:   Diagnosis Date    Anxiety     Celiac disease 2018    Celiac disease     Depression     Diabetes mellitus     Family history of breast cancer in mother      at age 68    Hyperlipidemia     Hypertension     Meniere disease     Meniere's disease, unspecified ear     Menopause     Peptic ulcer     Reflux esophagitis     Urinary tract infection     Vaginal infection     Vaginal Pap smear 2014    Pap/Hpv Negative        Past Surgical History:   Procedure Laterality Date    APPENDECTOMY      BREAST SURGERY      Tags    CARPAL TUNNEL RELEASE Bilateral 2017    ,     CLOSURE, COLOSTOMY Left 2023    Procedure: CLOSURE, COLOSTOMY;  Surgeon: Manuel Javier MD;  Location: [facility] OR;  Service: General;  Laterality: Left;    COLONOSCOPY  2018    Normal  ([place] [facility])     COLOSTOMY Right 2023    Procedure: CREATION, COLOSTOMY;  Surgeon: Manuel Javier MD;  Location: [facility] OR;  Service: General;  Laterality: Right;    DILATION AND CURETTAGE OF UTERUS  1986    DUPUYTREN CONTRACTURE RELEASE Bilateral 2017    HYSTERECTOMY      BEAU w/ appy, no BSO     IMPLANTATION OF LEADLESS PACEMAKER N/A 2024    Procedure: INSERTION, CARDIAC PACEMAKER, LEADLESS;  Surgeon: LENIN Frances MD;  Location: [facility] EP LAB;  Service: Cardiology;  Laterality: N/A;  SB, LEADLESS PPM (MICRA), MDT, ANES, EH, CVICU [id number]    INJECTION OF NEUROLYTIC AGENT AROUND LUMBAR SYMPATHETIC NERVE N/A 2022    Procedure: BLOCK, LUMBAR SYMPATHETIC;  Surgeon: Souleymane Rizo Jr., MD;  Location: [facility]" PAIN MGT;  Service: Pain Management;  Laterality: N/A;  no pacemaker   pt is diabetic     INJECTION OF NEUROLYTIC AGENT AROUND LUMBAR SYMPATHETIC NERVE N/A 8/25/2022    Procedure: BLOCK, LUMBAR SYMPATHETIC;  Surgeon: Souleymane Rizo Jr., MD;  Location: [facility] PAIN MGT;  Service: Pain Management;  Laterality: N/A;  diabetic     INSERTION OF TUNNELED CENTRAL VENOUS HEMODIALYSIS CATHETER Right 1/25/2023    Procedure: INSERTION, CATHETER, HEMODIALYSIS, DUAL LUMEN;  Surgeon: Manuel Javier MD;  Location: [facility] OR;  Service: General;  Laterality: Right;    INSERTION, CATHETER, TUNNELED Left 1/28/2023    Procedure: Insertion,catheter,tunneled;  Surgeon: Watson Fontenot MD;  Location: [facility] OR;  Service: General;  Laterality: Left;    LAPAROTOMY, EXPLORATORY N/A 1/14/2023    Procedure: LAPAROTOMY, EXPLORATORY;  Surgeon: Manuel Javier MD;  Location: [facility] OR;  Service: General;  Laterality: N/A;    LAPAROTOMY, EXPLORATORY N/A 1/16/2023    Procedure: LAPAROTOMY, EXPLORATORY;  Surgeon: Manuel Javier MD;  Location: [facility] OR;  Service: General;  Laterality: N/A;    LYMPHADENECTOMY Right 1/2/2024    Procedure: LYMPHADENECTOMY;  Surgeon: Tan Thomson MD;  Location: [address];  Service: Cardiothoracic;  Laterality: Right;    PACEMAKER, TEMPORARY, TRANSVENOUS, PEDIATRIC Right 1/12/2024    Procedure: Pacemaker, Temporary, Transvenous;  Surgeon: Todd Carlisle MD;  Location: [facility] CATH LAB;  Service: Cardiology;  Laterality: Right;    REMOVAL OF CATHETER Right 1/28/2023    Procedure: REMOVAL, CATHETER;  Surgeon: Watson Fontenot MD;  Location: [facility] OR;  Service: General;  Laterality: Right;    REMOVAL, TUNNELED CATH Right 1/25/2023    Procedure: REMOVAL, TUNNELED CATH;  Surgeon: Manuel Javier MD;  Location: [facility] OR;  Service: General;  Laterality: Right;    ROBOTIC BRONCHOSCOPY N/A 10/20/2023    Procedure: ROBOTIC BRONCHOSCOPY;  Surgeon: Mayda Monzon MD;  [facility];  Service:  Pulmonary;  Laterality: N/A;    SHOULDER SURGERY  2009 & 2010    right rotator cuff    THORACOTOMY Right 1/2/2024    Procedure: THORACOTOMY;  Surgeon: Tan Thomson MD;  Location: [facility] OR [address] [place] [address];  Service: Cardiothoracic;  Laterality: Right;    THORACOTOMY, WITH INITIAL THERAPEUTIC WEDGE RESECTION OF LUNG Right 1/2/2024    Procedure: THORACOTOMY, WITH INITIAL THERAPEUTIC WEDGE RESECTION OF LUNG;  Surgeon: Tan Thomson MD;  Location: [facility] OR [address] [place] [address];  Service: Cardiothoracic;  Laterality: Right;    TONSILLECTOMY      UPPER GASTROINTESTINAL ENDOSCOPY  04/2018       Review of patient's allergies indicates:   Allergen Reactions    Crestor [rosuvastatin] Swelling    Gluten protein      Family History       Problem Relation (Age of Onset)    Arthritis Father    Breast cancer Mother, Paternal Aunt    Cancer Mother, Paternal Aunt    Diabetes Paternal Grandfather    Hyperlipidemia Sister    Vision loss Father, Mother, Sister          Tobacco Use    Smoking status: Never    Smokeless tobacco: Never   Substance and Sexual Activity    Alcohol use: No    Drug use: Never    Sexual activity: Not Currently     Partners: Male     Birth control/protection: See Surgical Hx     Comment: :      Review of Systems   Constitutional:  Negative for fever.   Gastrointestinal:  Positive for anal bleeding and blood in stool. Negative for abdominal pain and vomiting.     Objective:     Vital Signs (Most Recent):  Temp: 98.1 °F (36.7 °C) (01/24/24 0700)  Pulse: 82 (01/24/24 0853)  Resp: 15 (01/24/24 0853)  BP: 124/78 (01/24/24 0800)  SpO2: 98 % (01/24/24 0853) Vital Signs (24h Range):  Temp:  [97.5 °F (36.4 °C)-98.1 °F (36.7 °C)] 98.1 °F (36.7 °C)  Pulse:  [] 82  Resp:  [14-67] 15  SpO2:  [66 %-100 %] 98 %  BP: ()/() 124/78     Weight: 67.1 kg (148 lb) (01/24/24 0334)  Body mass index is 24.63 kg/m².      Intake/Output Summary (Last 24 hours) at 1/24/2024 0911  Last data filed at 1/24/2024 0705  Gross per 24  hour   Intake 346 ml   Output 640 ml   Net -294 ml       Lines/Drains/Airways       Central Venous Catheter Line  Duration                  Hemodialysis Catheter 01/25/23 1501 right internal jugular 363 days              Drain  Duration                  Chest Tube 01/17/24 1504 Right Pleural 10 Fr. 6 days         Chest Tube 01/18/24 1537 Left Pleural 10 Fr. 5 days              Peripheral Intravenous Line  Duration                  Hemodialysis AV Fistula 09/01/23 0800 Left forearm 145 days         Midline Catheter Insertion/Assessment  - Single Lumen 01/22/24 1707 Right basilic vein (medial side of arm) 18g x 10cm 1 day                     Physical Exam  Vitals reviewed.   Constitutional:       General: She is not in acute distress.     Appearance: She is ill-appearing.   Eyes:      Extraocular Movements: Extraocular movements intact.   Cardiovascular:      Rate and Rhythm: Normal rate.   Pulmonary:      Effort: Pulmonary effort is normal.   Abdominal:      General: There is no distension.      Palpations: Abdomen is soft.      Tenderness: There is no abdominal tenderness.   Neurological:      Mental Status: She is alert. Mental status is at baseline.          Significant Labs:  All pertinent lab results from the last 24 hours have been reviewed.    Significant Imaging:  Imaging results within the past 24 hours have been reviewed.  Assessment/Plan:     GI  GIB (gastrointestinal bleeding)  73 year old female with ESRD on HD, GERD, carcinoid tumor of the right middle lobe s/p thoracotomy and resection with mediastinal lymph node dissection 1/2/2024, peritonitis secondary to diffuse colonic ischemia s/p ex-lap with total colectomy and end ileostomy 1/14/23 complicated by intra-abdominal hematoma requiring evacuation and ultimately ileostomy reversal and ileocolonic anastomosis 8/2023 with complicated hospital course now with large episode of black stools with associated clots. Hgb 5.5, PTT 80.9, INR 1.3 as of 1/23, now  s/p 3 U pRBCs, FFP and protamine without further episodes of bleeding. She remains HDS not requiring pressors.     Recommendations:  - Keep NPO, plan for EGD today.   - Trend Hgb q12hrs. Transfuse for Hgb > 7, unless otherwise indicated  - Continue IV PPI 40 mg BID.  - Maintain IV access with 2 large bore Ivs  - Intravascular resuscitation/support with IVFs   - Hold all NSAIDs and anticoagulants, unless contraindicated  - Please correct any coagulopathy with platelets and FFP for goal of platelets >50K and INR <2.0  - Please notify GI team if there is significant change in patient's clinical status        Thank you for your consult. I will follow-up with patient. Please contact us if you have any additional questions.    Nadia Herring MD  Gastroenterology  [facility]

## 2024-01-24 NOTE — TRANSFER OF CARE
"Anesthesia Transfer of Care Note    Patient: Lily Green    Procedure(s) Performed: Procedure(s) (LRB):  EGD (ESOPHAGOGASTRODUODENOSCOPY) (N/A)    Patient location: ICU    Anesthesia Type: general    Transport from OR: Transported from OR on 2-3 L/min O2 by NC with adequate spontaneous ventilation. Continuous ECG monitoring in transport. Continuous SpO2 monitoring in transport    Post pain: adequate analgesia    Post assessment: no apparent anesthetic complications and tolerated procedure well    Post vital signs: stable    Level of consciousness: sedated and responds to stimulation    Nausea/Vomiting: no nausea/vomiting    Complications: none    Transfer of care protocol was followedComments: Patient's procedure done at bedside in the ICU. Report given to Michelle ICU RN. All questions answered. Patient remains connected to ICU monitor in ICU room.       Last vitals: Visit Vitals  BP 95/51(BP Location: Left leg, Patient Position: Lying)   Pulse 67   Temp 36.5 °C (97.7 °F) (Oral)   Resp 12   Ht 5' 5" (1.651 m)   Wt 67.1 kg (148 lb)   LMP  (LMP Unknown)   SpO2 100%   Breastfeeding No   BMI 24.63 kg/m²     "

## 2024-01-24 NOTE — ANESTHESIA PREPROCEDURE EVALUATION
Anesthesia Pre-Operative Evaluation     Patient Name: Lily Green  YOB: 1950  MRN: [id number]  CSN: [id number]       Admit Date: 1/2/2024   Admit Team: Networked reference to record PCT   Hospital Day: 23  Date of Procedure: 1/24/2024  Anesthesia: General/MAC Procedure: Procedure(s) (LRB):  EGD (ESOPHAGOGASTRODUODENOSCOPY) (N/A)  Pre-Operative Diagnosis: Anemia due to GI blood loss [D50.0]  Proceduralist:Surgeon(s) and Role:     * Yamilet Walters MD - Primary  Code Status: Full Code   Advanced Directive: <no information>  Isolation Precautions: No active isolations  Capacity: Full capacity     SUBJECTIVE:   Lily Green is a 73 y.o. female who  has a past medical history of Anxiety, Celiac disease (05/2018), Celiac disease, Depression, Diabetes mellitus, Family history of breast cancer in mother, Hyperlipidemia, Hypertension, Meniere disease, Meniere's disease, unspecified ear, Menopause, Peptic ulcer, Reflux esophagitis, Urinary tract infection, Vaginal infection, and Vaginal Pap smear (04/07/2014).  Lily Green is a 73 year old female for whom GI is consulted with concerns for GIB. 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 History provided by spouse as patient clearly in distress during assessment.    [place] [facility] [place] further evaluation of her carcinoid tumor now s/p RML thoracotomy and resection. Post-operative course complicated by bilateral pleural effusion w/right chest tube placement 1/17, L chest tube placement 1/18, SBO that resolved with conservative management, refractory  "A-fib with tachy-eulogio syndrome s/p micra placement 1/16 started on low-intensity heparin gtt (resumed 1/22), sepsis treated with IV abx. On 1/23, she had a syncopal episode while having a bowel movement, thought to be vasovagal as well as hypotensive episodes with HD requiring midodrine. GI contacted on 1/23 after she had a large episode of black stools with associated dark clots, no bright red blood. Hgb 5.5 (previously 7.4 that morning), leukocytosis (15), INR 1.3, PTT 80.9, otherwise nml platelets and fibrinogen. She was HDS during episode of bleeding, was transfused 3 U pRBCs, FFP and Protamine. CTA negative for active bleeding. Since the Protamine was administered, she has had no further episodes of bleeding and remains HDS. Repeat Hgb 8.2 this morning.     Most recent endoscopic history:  -Colonoscopy 4/2018: tortuous colon, stool throughout colon so nondiagnostic.   -Flex sig 6/2023 for "fistula of intestine": rectal biopsies with focally active proctitis.      Lily Green is a 73 y.o. female with a PMHx significant for HTN, T2DM, GERD, and ESRD (on HD via R IJ temporary HD catheter) with RML carcinoid tumor now s/p RML therapeutic wedge resection on 01/02/2024 via right thoracotomy. Postoperative course complicated by multiple arrhythmias including AFib with RVR, tachy-eulgoio syndrome, and asystolic arrest x 3, now s/p PPM placement with stable hemodynamics with likely stepdown from ICU in next days.     Patient developed new-onset bloody stools on 1/23 with concomitant significant anemia (Hgb 5.5) for which GI was consulted and has recommended endoscopic evaluation.     Level of Care: Surgical ICU  Hemodynamic Status: No significant abnormalities. No vasopressor or inotropic support. No recent arrhythmic events.  Respiratory Status: Room air. Bilateral pleural chest tubes due to pleural effusions.  IV Access: 18G R Basilic Midline x 1, 20G PIV R AC x 1  NPO: at MN 1/24    She now presents for " the above procedure(s) with GI - Dr. Walters.    Previous Airway (10/20/2023):     Mask Ventilation:  Easy mask    Attempts:  2   Attempted By:  Student    Method of Intubation:  Direct   Blade:  Chacon 2    Laryngeal View Grade: Grade III - only epiglottis visible      Attempted By (2nd Attempt):  CRNA    Method of Intubation (2nd Attempt):  Direct   Blade (2nd Attempt):  Raul 3    Laryngeal View Grade (2nd Attempt): Grade IIa - cords partially seen      Difficult Airway Encountered?: No     Complications:  None    Airway Device:  Oral endotracheal tube    Airway Device Size:  8.0    Tube secured:  21  Secured at:  The lips    Complicating Factors:  Anterior larynx  [facility] LOS: 22 days  ICU LOS: 18d 22h      she has a current medication list which includes the following long-term medication(s): atorvastatin, blood-glucose meter, calcium-vitamin d3, econazole nitrate, insulin aspart u-100, lancets, meclizine, midodrine, mometasone, pantoprazole, pregabalin, pregabalin, sevelamer hcl, and torsemide.     ALLERGIES:     Review of patient's allergies indicates:   Allergen Reactions    Crestor [rosuvastatin] Swelling    Gluten protein      LDA:   AIRWAY:         * No LDAs found *      Lines/Drains/Airways       Central Venous Catheter Line  Duration                  Hemodialysis Catheter 01/25/23 1501 right internal jugular 363 days              Drain  Duration                  Chest Tube 01/17/24 1504 Right Pleural 10 Fr. 6 days         Chest Tube 01/18/24 1537 Left Pleural 10 Fr. 5 days              Peripheral Intravenous Line  Duration                  Hemodialysis AV Fistula 09/01/23 0800 Left forearm 145 days         Midline Catheter Insertion/Assessment  - Single Lumen 01/22/24 1707 Right basilic vein (medial side of arm) 18g x 10cm 1 day                   Anesthesia Evaluation      Airway   Mallampati: IV  TM distance: Normal  Dental    (+) Periodontal disease        Pulmonary     (-) COPD, asthma, sleep apnea  Cardiovascular   (+) hypertension, dysrhythmias (Tachy-Ra, s/p PPM/AICD placement)  (-) CHF    ECG reviewed    Neuro/Psych    (-) seizures, CVA    GI/Hepatic/Renal    (+) GERD, chronic renal disease (HD via R IJ Temp HD Cath) ESRD and Dialysis  (-) liver disease    Endo/Other    (+) diabetes mellitus type 2 well controlled  (-) hypothyroidism  Abdominal                    MEDICATIONS:     Current Outpatient Medications on File Prior to Encounter   Medication Sig Dispense Refill Last Dose    atorvastatin (LIPITOR) 40 MG tablet Take 1 tablet (40 mg total) by mouth once daily. (Patient taking differently: Take 40 mg by mouth every evening.) 90 tablet 3 1/1/2024    epoetin noble-epbx (RETACRIT) 10,000 unit/mL imjection Inject 1 mL (10,000 Units total) into the skin every Mon, Wed, Fri. HOLD IF HEMOGLOBIN is 10 or GREATER    DO NOT SHAKE  DO NOT DILUTE  DO NOT MIX with other drug solutions 12 mL 0 1/1/2024 at 0900    ferrous gluconate (FERGON) 324 MG tablet Take 1 tablet (324 mg total) by mouth daily with breakfast. 30 tablet 3 1/1/2024 at 2200    meclizine (ANTIVERT) 25 mg tablet Take 1 tablet (25 mg total) by mouth 3 (three) times daily as needed for Dizziness. 90 tablet 3 1/1/2024 at 0800    melatonin (MELATIN) 3 mg tablet Take 3 tablets (9 mg total) by mouth nightly as needed for Insomnia. 90 tablet 3 1/1/2024 at 2100    pantoprazole (PROTONIX) 40 MG tablet Take 1 tablet (40 mg total) by mouth once daily. 90 tablet 3 1/1/2024 at 0800    patiromer calcium sorbitex (VELTASSA) 8.4 gram PwPk Take 2 packets (16.8 g total) by mouth once daily. 180 packet 2 1/1/2024 at 2100    sevelamer HCL (RENAGEL) 800 MG Tab Take 2 tablets (1,600 mg total) by mouth 3 (three) times daily with meals. 180 tablet 11 1/1/2024 at 1800    B complex-vitamin C-folic acid (MELLISSA-BENJY) 0.8 mg Tab Take 1 tablet (0.8 mg total) by mouth once daily. 30 tablet 3 12/28/2023 at 0800    betahistine HCl (BETAHISTINE, BULK,  "MISC)    Unknown    blood sugar diagnostic (TRUE METRIX GLUCOSE TEST STRIP) Strp USE ONE STRIP FOR TESTING 2 TIMES A  each 11     blood-glucose meter kit Use to test twice a day 1 each 0     calcium-vitamin D3 (OS-CIPRIANO 500 + D3) 500 mg-5 mcg (200 unit) per tablet Take 1 tablet by mouth once daily. 30 tablet 3 12/28/2023 at 0800    coenzyme Q10 100 mg capsule    More than a month at 0800    dicyclomine (BENTYL) 10 MG capsule    Unknown    dronabinoL (MARINOL) 5 MG capsule Take 1 capsule (5 mg total) by mouth 2 (two) times daily before meals. 60 capsule 1 Unknown    econazole nitrate 1 % cream Apply topically 2 (two) times daily. 30 g 2 Unknown    insulin aspart U-100 (NOVOLOG) 100 unit/mL (3 mL) InPn pen Inject before meals:  150-200=+1, 201-250=+2, 251-300=+3; 301-350=+4, over 350=+5 units 15 mL 0 12/31/2023 at 1800    lancets Misc 1 lancet by Misc.(Non-Drug; Combo Route) route 4 (four) times daily. 400 each 3     mometasone (ELOCON) 0.1 % ointment Apply topically.   Unknown    neomycin (MYCIFRADIN) 500 mg Tab    Unknown    nutritional supplements Liqd Take 237 mLs by mouth 4 (four) times daily. 120 each 6 More than a month    oxyCODONE (ROXICODONE) 5 MG immediate release tablet Take 5 mg by mouth every 4 (four) hours as needed.   More than a month    pen needle, diabetic (BD ULTRA-FINE MARIS PEN NEEDLE) 32 gauge x 5/32" Ndle 1 Device by Misc.(Non-Drug; Combo Route) route 4 (four) times daily with meals and nightly. 400 each 3     pregabalin (LYRICA) 150 MG capsule TAKE ONE CAPSULE BY MOUTH 2 TIMES A DAY 60 capsule 2 Unknown    pregabalin (LYRICA) 75 MG capsule Take 1 capsule (75 mg total) by mouth Daily. Give after HD 30 capsule 0 Unknown    simethicone (MYLICON) 125 mg Cap capsule Take by mouth.   Unknown    vit C,G-Ii-qpnxx-lutein-zeaxan (PRESERVISION AREDS 2) [phone] mg-unit-mg-mg Cap    Unknown    vitamins  A,C,E-zinc-copper [phone] unit-mg-unit Cap Take 1 tablet by mouth once daily.   Unknown "      Inpatient Medications:  Antibiotics (From admission, onward)      Start     Stop Route Frequency Ordered    01/22/24 2100  cefpodoxime tablet 200 mg         -- Oral Nightly 01/22/24 0939    01/21/24 2100  metroNIDAZOLE tablet 500 mg         -- Oral Every 12 hours 01/21/24 1209          VTE Risk Mitigation (From admission, onward)           Ordered     IP VTE HIGH RISK PATIENT  Once         01/23/24 1527     heparin 25,000 units in dextrose 5% 250 mL (100 units/mL) infusion LOW INTENSITY nomogram - [facility]  Continuous        Question:  Begin at (units/kg/hr)  Answer:  16    01/15/24 1038     heparin (porcine) injection 1,000 Units  As needed (PRN)         01/11/24 1351     heparin (porcine) injection 1,000 Units  As needed (PRN)         01/03/24 1123     Place CARMELITA hose  Until discontinued         01/02/24 1226     Place sequential compression device  Until discontinued         01/02/24 1226                   sodium chloride 0.9%   Intravenous Once    cefpodoxime  200 mg Oral QHS    droNABinol  5 mg Oral BID    epoetin noble (PROCRIT) injection  4,000 Units Intravenous Every Mon, Wed, Fri    insulin aspart U-100  3 Units Subcutaneous TIDWM    insulin detemir U-100  7 Units Subcutaneous Daily    LIDOcaine  1 patch Transdermal Q24H    metoprolol tartrate  12.5 mg Oral BID    metroNIDAZOLE  500 mg Oral Q12H    midodrine  10 mg Oral BID WM    oxybutynin  5 mg Oral TID    pantoprazole  40 mg Intravenous BID    polyethylene glycol  17 g Oral Daily    senna-docusate 8.6-50 mg  1 tablet Oral Daily    sodium chloride 0.9%  10 mL Intravenous Q6H    venlafaxine  37.5 mg Oral Daily       Current Facility-Administered Medications   Medication Dose Route Frequency Provider Last Rate Last Admin    0.9%  NaCl infusion   Intravenous PRN Brooklyn Sapp MD 5 mL/hr at 01/21/24 1300 Restarted at 01/21/24 1300    0.9%  NaCl infusion   Intravenous Once Makenzie Melissa, DNP, FNP-C        acetaminophen tablet 650 mg  650 mg Oral Q4H  PRN Indiana Wheeler MD   650 mg at 01/20/24 2010    albuterol sulfate nebulizer solution 2.5 mg  2.5 mg Nebulization Q4H PRN Gissel Jack MD   2.5 mg at 01/17/24 2023    bisacodyL suppository 10 mg  10 mg Rectal Daily PRN Brooklyn Arnold PA-C        cefpodoxime tablet 200 mg  200 mg Oral QHS Prabhu Esqueda MD   200 mg at 01/23/24 2207    dextrose 10% bolus 125 mL 125 mL  12.5 g Intravenous PRN Hue Lu PA-C        dextrose 10% bolus 250 mL 250 mL  25 g Intravenous PRN Hue Lu PA-C        droNABinol capsule 5 mg  5 mg Oral BID Crispin Tafoya MD   5 mg at 01/24/24 0917    epoetin noble injection 4,000 Units  4,000 Units Intravenous Every Mon, Wed, Fri Christy Gaytan DNP   4,000 Units at 01/24/24 1129    glucagon (human recombinant) injection 1 mg  1 mg Intramuscular PRN Hue Lu PA-C        glucose chewable tablet 16 g  16 g Oral PRN Hue Lu PA-C        glucose chewable tablet 24 g  24 g Oral PRN Hue Lu PA-C        heparin (porcine) injection 1,000 Units  1,000 Units Intravenous PRN Christy Gaytan DNP   1,000 Units at 01/23/24 0030    hydrALAZINE injection 10 mg  10 mg Intravenous Q6H PRN Gissel Jack MD   10 mg at 01/10/24 1252    insulin aspart U-100 pen 0-10 Units  0-10 Units Subcutaneous QID (AC + HS) PRN Hue Lu PA-C   2 Units at 01/24/24 1134    insulin aspart U-100 pen 3 Units  3 Units Subcutaneous TIDWM Pranav Perry DNP, FNP        insulin detemir U-100 (Levemir) pen 7 Units  7 Units Subcutaneous Daily Pranav Perry DNP, FNP   7 Units at 01/24/24 0919    LIDOcaine 5 % patch 1 patch  1 patch Transdermal Q24H Kat Flores MD   1 patch at 01/24/24 0920    metoprolol tartrate (LOPRESSOR) split tablet 12.5 mg  12.5 mg Oral BID Prabhu Esqueda MD   12.5 mg at 01/24/24 0918    metroNIDAZOLE tablet 500 mg  500 mg Oral Q12H Prabhu Esqueda MD   500 mg at 01/24/24 0917    midodrine tablet 10 mg  10 mg Oral BID WM Sapp  MD Brooklyn   10 mg at 01/24/24 0735    midodrine tablet 15 mg  15 mg Oral PRN Prabhu Esqueda MD   15 mg at 01/22/24 2030    ondansetron injection 8 mg  8 mg Intravenous Q8H PRN Merrill Gilbert MD   8 mg at 01/24/24 0543    oxybutynin tablet 5 mg  5 mg Oral TID Brooklyn Sapp MD   5 mg at 01/24/24 0918    pantoprazole injection 40 mg  40 mg Intravenous BID Gissel Jack MD   40 mg at 01/24/24 0915    polyethylene glycol packet 17 g  17 g Oral Daily Gissel Jack MD   17 g at 01/23/24 0900    senna-docusate 8.6-50 mg per tablet 1 tablet  1 tablet Oral Daily Gissel Jack MD   1 tablet at 01/23/24 0859    sodium chloride 0.9% flush 10 mL  10 mL Intravenous PRN Josy Mcmullen MD        sodium chloride 0.9% flush 10 mL  10 mL Intravenous Q6H Tan Thomson MD   10 mL at 01/24/24 1200    And    sodium chloride 0.9% flush 10 mL  10 mL Intravenous PRN Tan Thomson MD        venlafaxine tablet 37.5 mg  37.5 mg Oral Daily Brooklyn Sapp MD   37.5 mg at 01/24/24 0918          History:     Active Hospital Problems    Diagnosis  POA    *Malignant carcinoid tumor of bronchus [C7A.090]  Yes    GIB (gastrointestinal bleeding) [K92.2]  No    Syncope [R55]  No    Carcinoid tumor of lung [D3A.090]  Yes    Parapneumonic effusion [J18.9, J91.8]  Yes    Bradycardia [R00.1]  No    Anticoagulated on heparin [Z79.01]  Not Applicable    Anuria [R34]  Yes    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     Surgical History:    has a past surgical history that includes Tonsillectomy; Appendectomy; Dilation and curettage of uterus (1986); Hysterectomy (1987); Carpal tunnel release (Bilateral, 09/2017); Shoulder  surgery (2009 & 2010); Colonoscopy (04/2018); Upper gastrointestinal endoscopy (04/2018); Dupuytren contracture release (Bilateral, 09/2017); Breast surgery; Injection of neurolytic agent around lumbar sympathetic nerve (N/A, 1/6/2022); Injection of neurolytic agent around lumbar sympathetic nerve (N/A, 8/25/2022); laparotomy, exploratory (N/A, 1/14/2023); laparotomy, exploratory (N/A, 1/16/2023); closure, colostomy (Left, 1/16/2023); Colostomy (Right, 1/16/2023); Insertion of tunneled central venous hemodialysis catheter (Right, 1/25/2023); removal, tunneled cath (Right, 1/25/2023); insertion, catheter, tunneled (Left, 1/28/2023); Removal of catheter (Right, 1/28/2023); robotic bronchoscopy (N/A, 10/20/2023); lymphadenectomy (Right, 1/2/2024); thoracotomy (Right, 1/2/2024); thoracotomy, with initial therapeutic wedge resection of lung (Right, 1/2/2024); pacemaker, temporary, transvenous, pediatric (Right, 1/12/2024); and Implantation of leadless pacemaker (N/A, 1/16/2024).   Social History:    reports that she is not currently sexually active and has had partner(s) who are male. She reports using the following method of birth control/protection: See Surgical Hx.  reports that she has never smoked. She has never used smokeless tobacco. She reports that she does not drink alcohol and does not use drugs.    Vitals:    01/24/24 1015 01/24/24 1030 01/24/24 1045 01/24/24 1100   BP:  134/62  137/62   BP Location:    Left leg   Patient Position:    Lying   Pulse: 83 83 82 83   Resp: 19 15 13 13   Temp:    36.5 °C (97.7 °F)   TempSrc:    Oral   SpO2: 98% 97% 97% 98%   Weight:       Height:         Vital Signs Range (Last 24H):  Temp:  [36.4 °C (97.5 °F)-36.7 °C (98.1 °F)]   Pulse:  []   Resp:  [12-67]   BP: (105-202)/()   SpO2:  [66 %-100 %]     Body mass index is 24.63 kg/m².  Wt Readings from Last 4 Encounters:   01/24/24 67.1 kg (148 lb)   12/20/23 60.2 kg (132 lb 11.5 oz)   11/27/23 58.2 kg (128 lb 4.9 oz)    11/22/23 58.3 kg (128 lb 8.5 oz)        Intake/Output - Last 3 Shifts         01/22 0700 01/23 0659 01/23 0700 01/24 0659 01/24 0700 01/25 0659    P.O. 720 282 200    I.V. (mL/kg) 201.1 (3) 18.6 (0.3)     Blood  286     Other 600      IV Piggyback 132.7      Total Intake(mL/kg) 1653.7 (24.4) 586.6 (8.7) 200 (3)    Urine (mL/kg/hr)  0 (0)     Other 900      Stool  0     Chest Tube 820 620 90    Total Output 1720 620 90    Net -66.3 -33.4 +110           Urine Occurrence  1 x     Stool Occurrence  7 x           Lab Results   Component Value Date    WBC 17.21 (H) 01/24/2024    HGB 8.2 (L) 01/24/2024    HCT 24.2 (L) 01/24/2024     01/24/2024     (L) 01/24/2024    K 4.4 01/24/2024     01/24/2024    CREATININE 3.3 (H) 01/24/2024    BUN 75 (H) 01/24/2024    CO2 23 01/24/2024     (H) 01/24/2024    CALCIUM 7.9 (L) 01/24/2024    MG 2.0 01/24/2024    PHOS 2.7 01/24/2024    ALKPHOS 75 01/24/2024    ALT 10 01/24/2024    AST 17 01/24/2024    ALBUMIN 1.8 (L) 01/24/2024    INR 1.3 (H) 01/23/2024    APTT 80.9 (H) 01/23/2024    GLUF 86 07/11/2023    HGBA1C 4.9 11/15/2023     01/14/2023    TROPONINI 0.108 (H) 01/06/2024     Recent Results (from the past 12 hour(s))   Comprehensive metabolic panel    Collection Time: 01/24/24  2:19 AM   Result Value Ref Range    Sodium 135 (L) 136 - 145 mmol/L    Potassium 4.4 3.5 - 5.1 mmol/L    Chloride 106 95 - 110 mmol/L    CO2 23 23 - 29 mmol/L    Glucose 181 (H) 70 - 110 mg/dL    BUN 75 (H) 8 - 23 mg/dL    Creatinine 3.3 (H) 0.5 - 1.4 mg/dL    Calcium 7.9 (L) 8.7 - 10.5 mg/dL    Total Protein 4.4 (L) 6.0 - 8.4 g/dL    Albumin 1.8 (L) 3.5 - 5.2 g/dL    Total Bilirubin 1.0 0.1 - 1.0 mg/dL    Alkaline Phosphatase 75 55 - 135 U/L    AST 17 10 - 40 U/L    ALT 10 10 - 44 U/L    eGFR 14.2 (A) >60 mL/min/1.73 m^2    Anion Gap 6 (L) 8 - 16 mmol/L   Magnesium    Collection Time: 01/24/24  2:19 AM   Result Value Ref Range    Magnesium 2.0 1.6 - 2.6 mg/dL   Phosphorus     Collection Time: 01/24/24  2:19 AM   Result Value Ref Range    Phosphorus 2.7 2.7 - 4.5 mg/dL   CBC auto differential    Collection Time: 01/24/24  7:38 AM   Result Value Ref Range    WBC 17.21 (H) 3.90 - 12.70 K/uL    RBC 2.60 (L) 4.00 - 5.40 M/uL    Hemoglobin 8.2 (L) 12.0 - 16.0 g/dL    Hematocrit 24.2 (L) 37.0 - 48.5 %    MCV 93 82 - 98 fL    MCH 31.5 (H) 27.0 - 31.0 pg    MCHC 33.9 32.0 - 36.0 g/dL    RDW 18.6 (H) 11.5 - 14.5 %    Platelets 194 150 - 450 K/uL    MPV 11.9 9.2 - 12.9 fL    Immature Granulocytes 1.6 (H) 0.0 - 0.5 %    Gran # (ANC) 13.8 (H) 1.8 - 7.7 K/uL    Immature Grans (Abs) 0.28 (H) 0.00 - 0.04 K/uL    Lymph # 1.3 1.0 - 4.8 K/uL    Mono # 1.8 (H) 0.3 - 1.0 K/uL    Eos # 0.0 0.0 - 0.5 K/uL    Baso # 0.03 0.00 - 0.20 K/uL    nRBC 0 0 /100 WBC    Gran % 80.2 (H) 38.0 - 73.0 %    Lymph % 7.3 (L) 18.0 - 48.0 %    Mono % 10.6 4.0 - 15.0 %    Eosinophil % 0.1 0.0 - 8.0 %    Basophil % 0.2 0.0 - 1.9 %    Differential Method Automated    POCT glucose    Collection Time: 01/24/24  7:42 AM   Result Value Ref Range    POCT Glucose 191 (H) 70 - 110 mg/dL     Recent Labs   Lab 01/22/24  0242 01/22/24  0811 01/23/24  0500 01/23/24  1544 01/23/24  2347 01/24/24  0219 01/24/24  0738   WBC 15.20*  --  13.08* 15.62* 16.26*  --  17.21*   HGB 8.5*  --  7.4* 5.5* 8.2*  --  8.2*   HCT 27.1*  --  23.1* 17.8* 24.8*  --  24.2*     --  207 225 165  --  194     --  136  --   --  135*  --    K 4.5  --  4.0  --   --  4.4  --    CREATININE 3.7*  --  2.7*  --   --  3.3*  --    *  --  168*  --   --  181*  --    INR  --  1.1  --  1.3*  --   --   --      I/O last 3 completed shifts:  In: 1270.2 [P.O.:282; I.V.:102.2; Blood:286; Other:600]  Out: 1520 [Other:900; Chest Tube:620]   No LMP recorded (lmp unknown). Patient has had a hysterectomy.    EKG:   Results for orders placed or performed during the hospital encounter of 01/02/24   EKG 12-lead    Collection Time: 01/19/24  5:04 PM    Narrative    Test Reason  : I48.91,    Vent. Rate : 115 BPM     Atrial Rate : 312 BPM     P-R Int : 000 ms          QRS Dur : 080 ms      QT Int : 284 ms       P-R-T Axes : 000 023 221 degrees     QTc Int : 392 ms    Atrial fibrillation with rapid ventricular response vs A Flutter  Low voltage QRS  Nonspecific T wave abnormality  Abnormal ECG  When compared with ECG of 16-JAN-2024 04:21,  A Flutter/AF has replaced Sinus rhythm  Vent. rate has increased BY  39 BPM  Confirmed by Sai SAGASTUME MD ([id number]) on 1/19/2024 9:24:54 PM    Referred By: CR CARLOS           Confirmed By:Sai SAGASTUME MD     TTE:  Results for orders placed or performed during the hospital encounter of 01/02/24   Echo   Result Value Ref Range    RA Width 2.85 cm    LA volume (mod) 52.06 cm3    Left Atrium Major Axis 4.58 cm    Left Atrium Minor Axis 4.81 cm    RA Major Axis 4.76 cm    LV Diastolic Volume 49.72 mL    LV Systolic Volume 18.49 mL    TR Max King 2.53 m/s    MV Peak E King 0.92 m/s    Mr max king 0.05 m/s    Ao VTI 20.69 cm    Ao peak king 1.17 m/s    LVOT peak VTI 17.46 cm    LVOT peak king 0.97 m/s    LVOT diameter 2.02 cm    AV mean gradient 3 mmHg    TAPSE 1.12 cm    RVDD 2.34 cm    LA size 3.24 cm    Ascending aorta 3.9 cm    STJ 2.95 cm    Sinus 3.28 cm    LVIDs 2.32 2.1 - 4.0 cm    Posterior Wall 1.02 0.6 - 1.1 cm    IVS 0.86 0.6 - 1.1 cm    LVIDd 3.47 (A) 3.5 - 6.0 cm    LA WIDTH 4.14 cm    FS 33 28 - 44 %    LA volume 53.50 cm3    LV mass 93.24 g    ZLVIDD -3.38     ZLVIDS -2.06     Left Ventricle Relative Wall Thickness 0.59 cm    AV valve area 2.70 cm²    AV Velocity Ratio 0.83     AV index (prosthetic) 0.84     LVOT area 3.2 cm2    LVOT stroke volume 55.93 cm3    AV peak gradient 5 mmHg    LV Systolic Volume Index 10.5 mL/m2    LV Diastolic Volume Index 28.25 mL/m2    LA Volume Index 30.4 mL/m2    LV Mass Index 53 g/m2    Triscuspid Valve Regurgitation Peak Gradient 26 mmHg    LA Volume Index (Mod) 29.6 mL/m2    IVANNA by Velocity Ratio 2.66 cm²    BSA 1.78 m2     TV resting pulmonary artery pressure 34 mmHg    RV TB RVSP 11 mmHg    Est. RA pres 8 mmHg    Narrative      Left Ventricle: The left ventricle is normal in size. Normal wall   thickness. Normal wall motion. There is normal systolic function with a   visually estimated ejection fraction of 60 - 65%. There is normal   diastolic function.    Right Ventricle: Normal right ventricular cavity size. Wall thickness   is normal. Right ventricle wall motion  is normal. Systolic function is   normal.    Aortic Valve: The aortic valve is a trileaflet valve. There is moderate   aortic valve sclerosis. There is mild annular calcification present.   Mildly restricted motion. There is mild aortic regurgitation.    Mitral Valve: There is mild to moderate regurgitation.    Tricuspid Valve: The tricuspid valve is structurally normal. There is   trace regurgitation.  No evidence of carcinoid disease of the tricuspid   valve.    Pulmonary Artery: There is borderline elevated pulmonary hypertension.   The estimated pulmonary artery systolic pressure is 34 mmHg.    IVC/SVC: Intermediate venous pressure at 8 mmHg.    Large left pleural effusion.    Pericardium: There is a moderate circumferential effusion, largest   anterior to the tricuspid valve annulus. Pericardial effusion has focal   strands. No indication of cardiac tamponade. Evidence includes no IVC   dilation, no chamber collapse.       No results found for this or any previous visit.  JARVIS:  No results found for this or any previous visit.  Stress Test:  Results for orders placed during the hospital encounter of 11/22/23    Nuclear Stress - Cardiology Interpreted    Interpretation Summary    Normal myocardial perfusion scan. There is no evidence of myocardial ischemia or infarction.    The LVEF is not accurate due to poor software boundary tracking at rest  and poor software boundary tracking during stress. The visually estimated ejection fraction is normal at rest and normal  "during stress.    There is mild global hypokinesis at rest and stress.    LV cavity size is normal at rest and normal at stress.    The ECG portion of the study is negative for ischemia.    The patient reported no chest pain during the stress test.    There were no arrhythmias during stress.    There are no prior studies for comparison.     LHC:  No results found for this or any previous visit.     PFT:  No results found for: "FEV1", "FVC", "AAI0QDU", "TLC", "DLCO"       ASSESSMENT/PLAN:     Lily Green is a 73 y.o. female with new-onset hematochezia presenting for EGD.      Pre-op Assessment    I have reviewed the Patient Summary Reports.     I have reviewed the Nursing Notes.    I have reviewed the Medications.     Review of Systems  Anesthesia Hx:  No problems with previous Anesthesia   History of prior surgery of interest to airway management or planning: lung surgery. Previous anesthesia: General          Denies Personal Hx of Anesthesia complications.                    Social:  No Alcohol Use, Non-Smoker       Hematology/Oncology:       -- Anemia:               Hematology Comments: Acute anemia (Hgb 5.5) in setting of hematochezia                    Cardiovascular:     Hypertension    Dysrhythmias (Tachy-Ra, s/p PPM/AICD placement) atrial fibrillation   Denies CHF.       ECG has been reviewed.                          Pulmonary:    Denies COPD.  Denies Asthma.     Denies Sleep Apnea.                Renal/:  Chronic Renal Disease (HD via R IJ Temp HD Cath), ESRD, Dialysis                Hepatic/GI:     GERD Denies Liver Disease.  Concern for upper GI bleed 2/2 hematochezia with acute anemia          Neurological:    Denies CVA.    Denies Seizures.                                Endocrine:  Diabetes, well controlled, type 2 Denies Hypothyroidism.        Denies Obesity / BMI > 30  Psych:   anxiety                 Physical Exam  General: Well nourished, Cooperative and " Alert    Airway:  Mallampati: IV / III  Mouth Opening: Small, but > 3cm  TM Distance: Normal  Tongue: Large    Dental:  Periodontal disease  No loose teeth per patient      Anesthesia Plan  Type of Anesthesia, risks & benefits discussed:    Anesthesia Type: Gen ETT, Gen Natural Airway  Intra-op Monitoring Plan: Standard ASA Monitors  Post Op Pain Control Plan: multimodal analgesia and IV/PO Opioids PRN  Induction:  IV  Informed Consent: Informed consent signed with the Patient and all parties understand the risks and agree with anesthesia plan.  All questions answered.   ASA Score: 4  Day of Surgery Review of History & Physical: H&P Update referred to the surgeon/provider.    Ready For Surgery From Anesthesia Perspective.     .

## 2024-01-24 NOTE — ASSESSMENT & PLAN NOTE
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 Hgb 5.5, PTT 80.9, INR 1.3 as of 1/23, now s/p 3 U pRBCs, FFP and protamine without further episodes of bleeding. She remains HDS not requiring pressors.     Recommendations:  - Keep NPO, plan for EGD today.   - Trend Hgb q12hrs. Transfuse for Hgb > 7, unless otherwise indicated  - Continue IV PPI 40 mg BID.  - Maintain IV access with 2 large bore Ivs  - Intravascular resuscitation/support with IVFs   - Hold all NSAIDs and anticoagulants, unless contraindicated  - Please correct any coagulopathy with platelets and FFP for goal of platelets >50K and INR <2.0  - Please notify GI team if there is significant change in patient's clinical status

## 2024-01-24 NOTE — PROVATION PATIENT INSTRUCTIONS
Discharge Summary/Instructions after an Endoscopic Procedure  Patient Name: Lily Green  Patient MRN: [id number]  Patient YOB: 1950 Wednesday, January 24, 2024  Yamilet Walters MD  Dear patient,  As a result of recent federal legislation (The Federal Cures Act), you may   receive lab or pathology results from your procedure in your [facility]   account before your physician is able to contact you. Your physician or   their representative will relay the results to you with their   recommendations at their soonest availability.  Thank you,  RESTRICTIONS:  During your procedure today, you received medications for sedation.  These   medications may affect your judgment, balance and coordination.  Therefore,   for 24 hours, you have the following restrictions:   - DO NOT drive a car, operate machinery, make legal/financial decisions,   sign important papers or drink alcohol.    ACTIVITY:  Today: no heavy lifting, straining or running due to procedural   sedation/anesthesia.  The following day: return to full activity including work.  DIET:  Eat and drink normally unless instructed otherwise.     TREATMENT FOR COMMON SIDE EFFECTS:  - Mild abdominal pain, nausea, belching, bloating or excessive gas:  rest,   eat lightly and use a heating pad.  - Sore Throat: treat with throat lozenges and/or gargle with warm salt   water.  - Because air was used during the procedure, expelling large amounts of air   from your rectum or belching is normal.  - If a bowel prep was taken, you may not have a bowel movement for 1-3 days.    This is normal.  SYMPTOMS TO WATCH FOR AND REPORT TO YOUR PHYSICIAN:  1. Abdominal pain or bloating, other than gas cramps.  2. Chest pain.  3. Back pain.  4. Signs of infection such as: chills or fever occurring within 24 hours   after the procedure.  5. Rectal bleeding, which would show as bright red, maroon, or black stools.   (A tablespoon of blood from the rectum is not serious, especially  if   hemorrhoids are present.)  6. Vomiting.  7. Weakness or dizziness.  GO DIRECTLY TO THE NEAREST EMERGENCY ROOM IF YOU HAVE ANY OF THE FOLLOWING:      Difficulty breathing              Chills and/or fever over 101 F   Persistent vomiting and/or vomiting blood   Severe abdominal pain   Severe chest pain   Black, tarry stools   Bleeding- more than one tablespoon   Any other symptom or condition that you feel may need urgent attention  Your doctor recommends these additional instructions:  If any biopsies were taken, your doctors clinic will contact you in 1 to 2   weeks with any results.  - Return patient to ICU for ongoing care.   - Resume previous diet.   - Continue present medications.   - Recommend anti-fungal agent for treatment of candida esophagitis  - Will continue to monitor, if ongoing evidence of active GI bleeding, would   consider repeat EGD.  For questions, problems or results please call your physician - Yamilet Walters MD at Work:  [phone].  [facility], EMERGENCY ROOM PHONE NUMBER: [phone]  IF A COMPLICATION OR EMERGENCY SITUATION ARISES AND YOU ARE UNABLE TO REACH   YOUR PHYSICIAN - GO DIRECTLY TO THE EMERGENCY ROOM.  Yamilet Walters MD  1/24/2024 2:39:33 PM  This report has been verified and signed electronically.  Dear patient,  As a result of recent federal legislation (The Federal Cures Act), you may   receive lab or pathology results from your procedure in your [facility]   account before your physician is able to contact you. Your physician or   their representative will relay the results to you with their   recommendations at their soonest availability.  Thank you,  PROVATION

## 2024-01-24 NOTE — ANESTHESIA POSTPROCEDURE EVALUATION
Anesthesia Post Evaluation    Patient: Lily Green    Procedure(s) Performed: Procedure(s) (LRB):  EGD (ESOPHAGOGASTRODUODENOSCOPY) (N/A)    Final Anesthesia Type: general      Patient location during evaluation: ICU  Patient participation: Yes- Able to Participate  Level of consciousness: awake and alert  Post-procedure vital signs: reviewed and stable  Pain management: adequate  Airway patency: patent    PONV status at discharge: No PONV  Anesthetic complications: no      Cardiovascular status: blood pressure returned to baseline  Respiratory status: unassisted  Hydration status: euvolemic  Follow-up not needed.              Vitals Value Taken Time   /77 01/24/24 1602   Temp 36.7 °C (98 °F) 01/24/24 1500   Pulse 71 01/24/24 1604   Resp 11 01/24/24 1604   SpO2 100 % 01/24/24 1604   Vitals shown include unvalidated device data.      No case tracking events are documented in the log.      Pain/Meredith Score: No data recorded

## 2024-01-24 NOTE — PLAN OF CARE
SICU PLAN OF CARE NOTE    Dx: Malignant carcinoid tumor of bronchus    Goals of Care: MAP>55    Vital Signs (last 12 hours):   Temp:  [97.5 °F (36.4 °C)-98 °F (36.7 °C)]   Pulse:  [88-98]   Resp:  [14-30]   BP: (105-202)/(53-82)   SpO2:  [96 %-100 %]      Neuro: AAO x4, Follows Commands, and Moves All Extremities    Cardiac: NSR    Respiratory: Nasal Cannula 2L      Urine Output: Voids Spontaneously unmeasuredcc/shift      Diet: NPO for 1/24 day shift EGD      Shift Events:  2 u PRBC, 1u Plasma. 1 bloody BM at start of shift. No acute events. See flowsheet for further assessment/details.

## 2024-01-24 NOTE — SUBJECTIVE & OBJECTIVE
Past Medical History:   Diagnosis Date    Anxiety     Celiac disease 2018    Celiac disease     Depression     Diabetes mellitus     Family history of breast cancer in mother      at age 68    Hyperlipidemia     Hypertension     Meniere disease     Meniere's disease, unspecified ear     Menopause     Peptic ulcer     Reflux esophagitis     Urinary tract infection     Vaginal infection     Vaginal Pap smear 2014    Pap/Hpv Negative        Past Surgical History:   Procedure Laterality Date    APPENDECTOMY      BREAST SURGERY      Tags    CARPAL TUNNEL RELEASE Bilateral 2017    ,     CLOSURE, COLOSTOMY Left 2023    Procedure: CLOSURE, COLOSTOMY;  Surgeon: Manuel Javier MD;  Location: [facility] OR;  Service: General;  Laterality: Left;    COLONOSCOPY  2018    Normal  ( Juan A)     COLOSTOMY Right 2023    Procedure: CREATION, COLOSTOMY;  Surgeon: Manuel Javier MD;  Location: [facility] OR;  Service: General;  Laterality: Right;    DILATION AND CURETTAGE OF UTERUS  1986    DUPUYTREN CONTRACTURE RELEASE Bilateral 2017    HYSTERECTOMY      BEAU w/ appy, no BSO     IMPLANTATION OF LEADLESS PACEMAKER N/A 2024    Procedure: INSERTION, CARDIAC PACEMAKER, LEADLESS;  Surgeon: LENIN Frances MD;  Location: [facility] EP LAB;  Service: Cardiology;  Laterality: N/A;  SB, LEADLESS PPM (MICRA), MDT, ANES, EH, CVICU [id number]    INJECTION OF NEUROLYTIC AGENT AROUND LUMBAR SYMPATHETIC NERVE N/A 2022    Procedure: BLOCK, LUMBAR SYMPATHETIC;  Surgeon: Souleymane Rizo Jr., MD;  Location: [facility] PAIN MGT;  Service: Pain Management;  Laterality: N/A;  no pacemaker   pt is diabetic     INJECTION OF NEUROLYTIC AGENT AROUND LUMBAR SYMPATHETIC NERVE N/A 2022    Procedure: BLOCK, LUMBAR SYMPATHETIC;  Surgeon: Souleymane Rizo Jr., MD;  Location: [facility] PAIN MGT;  Service: Pain Management;  Laterality: N/A;  diabetic     INSERTION OF TUNNELED CENTRAL VENOUS HEMODIALYSIS CATHETER Right 2023     Procedure: INSERTION, CATHETER, HEMODIALYSIS, DUAL LUMEN;  Surgeon: Manuel Javier MD;  Location: [facility] OR;  Service: General;  Laterality: Right;    INSERTION, CATHETER, TUNNELED Left 1/28/2023    Procedure: Insertion,catheter,tunneled;  Surgeon: Watson Fontenot MD;  Location: [facility] OR;  Service: General;  Laterality: Left;    LAPAROTOMY, EXPLORATORY N/A 1/14/2023    Procedure: LAPAROTOMY, EXPLORATORY;  Surgeon: Manuel Javier MD;  Location: [facility] OR;  Service: General;  Laterality: N/A;    LAPAROTOMY, EXPLORATORY N/A 1/16/2023    Procedure: LAPAROTOMY, EXPLORATORY;  Surgeon: Manuel Javier MD;  Location: [facility] OR;  Service: General;  Laterality: N/A;    LYMPHADENECTOMY Right 1/2/2024    Procedure: LYMPHADENECTOMY;  Surgeon: Tan Thomson MD;  Location: [facility] OR 2ND FLR;  Service: Cardiothoracic;  Laterality: Right;    PACEMAKER, TEMPORARY, TRANSVENOUS, PEDIATRIC Right 1/12/2024    Procedure: Pacemaker, Temporary, Transvenous;  Surgeon: Todd Carlisle MD;  Location: [facility] CATH LAB;  Service: Cardiology;  Laterality: Right;    REMOVAL OF CATHETER Right 1/28/2023    Procedure: REMOVAL, CATHETER;  Surgeon: Watson Fontenot MD;  Location: [facility] OR;  Service: General;  Laterality: Right;    REMOVAL, TUNNELED CATH Right 1/25/2023    Procedure: REMOVAL, TUNNELED CATH;  Surgeon: Manuel Javier MD;  Location: [facility] OR;  Service: General;  Laterality: Right;    ROBOTIC BRONCHOSCOPY N/A 10/20/2023    Procedure: ROBOTIC BRONCHOSCOPY;  Surgeon: Mayda Monzon MD;  Location: [facility] OR 2ND FLR;  Service: Pulmonary;  Laterality: N/A;    SHOULDER SURGERY  2009 & 2010    right rotator cuff    THORACOTOMY Right 1/2/2024    Procedure: THORACOTOMY;  Surgeon: Tan Thomson MD;  Location: [facility] OR 2ND FLR;  Service: Cardiothoracic;  Laterality: Right;    THORACOTOMY, WITH INITIAL THERAPEUTIC WEDGE RESECTION OF LUNG Right 1/2/2024    Procedure: THORACOTOMY, WITH INITIAL THERAPEUTIC WEDGE RESECTION OF LUNG;   Surgeon: Tan Thomson MD;  Location: [address];  Service: Cardiothoracic;  Laterality: Right;    TONSILLECTOMY      UPPER GASTROINTESTINAL ENDOSCOPY  04/2018       Review of patient's allergies indicates:   Allergen Reactions    Crestor [rosuvastatin] Swelling    Gluten protein      Family History       Problem Relation (Age of Onset)    Arthritis Father    Breast cancer Mother, Paternal Aunt    Cancer Mother, Paternal Aunt    Diabetes Paternal Grandfather    Hyperlipidemia Sister    Vision loss Father, Mother, Sister          Tobacco Use    Smoking status: Never    Smokeless tobacco: Never   Substance and Sexual Activity    Alcohol use: No    Drug use: Never    Sexual activity: Not Currently     Partners: Male     Birth control/protection: See Surgical Hx     Comment: :      Review of Systems   Constitutional:  Negative for fever.   Gastrointestinal:  Positive for anal bleeding and blood in stool. Negative for abdominal pain and vomiting.     Objective:     Vital Signs (Most Recent):  Temp: 98.1 °F (36.7 °C) (01/24/24 0700)  Pulse: 82 (01/24/24 0853)  Resp: 15 (01/24/24 0853)  BP: 124/78 (01/24/24 0800)  SpO2: 98 % (01/24/24 0853) Vital Signs (24h Range):  Temp:  [97.5 °F (36.4 °C)-98.1 °F (36.7 °C)] 98.1 °F (36.7 °C)  Pulse:  [] 82  Resp:  [14-67] 15  SpO2:  [66 %-100 %] 98 %  BP: ()/() 124/78     Weight: 67.1 kg (148 lb) (01/24/24 0334)  Body mass index is 24.63 kg/m².      Intake/Output Summary (Last 24 hours) at 1/24/2024 0911  Last data filed at 1/24/2024 0705  Gross per 24 hour   Intake 346 ml   Output 640 ml   Net -294 ml       Lines/Drains/Airways       Central Venous Catheter Line  Duration                  Hemodialysis Catheter 01/25/23 1501 right internal jugular 363 days              Drain  Duration                  Chest Tube 01/17/24 1504 Right Pleural 10 Fr. 6 days         Chest Tube 01/18/24 1537 Left Pleural 10 Fr. 5 days              Peripheral Intravenous Line   Duration                  Hemodialysis AV Fistula 09/01/23 0800 Left forearm 145 days         Midline Catheter Insertion/Assessment  - Single Lumen 01/22/24 1707 Right basilic vein (medial side of arm) 18g x 10cm 1 day                     Physical Exam  Vitals reviewed.   Constitutional:       General: She is not in acute distress.     Appearance: She is ill-appearing.   Eyes:      Extraocular Movements: Extraocular movements intact.   Cardiovascular:      Rate and Rhythm: Normal rate.   Pulmonary:      Effort: Pulmonary effort is normal.   Abdominal:      General: There is no distension.      Palpations: Abdomen is soft.      Tenderness: There is no abdominal tenderness.   Neurological:      Mental Status: She is alert. Mental status is at baseline.          Significant Labs:  All pertinent lab results from the last 24 hours have been reviewed.    Significant Imaging:  Imaging results within the past 24 hours have been reviewed.

## 2024-01-24 NOTE — SUBJECTIVE & OBJECTIVE
"Interval HPI:   Overnight events: No acute events overnight. Patient in room 87756 CVICU/88243 CVICU A. Blood glucose improving. BG at and above goal on current insulin regimen (SSI, prandial, and basal insulin ). Steroid use- None. 8 Days Post-Op  Renal function- Abnormal - Creatinine 3.3   Vasopressors-  None       Endocrine will continue to follow and manage insulin orders inpatient.         Diet NPO Except for: Sips with Medication     Eating:   NPO  Nausea: No  Hypoglycemia and intervention: No  Fever: No  TPN and/or TF: No      BP (!) 156/67 (BP Location: Left leg, Patient Position: Lying)   Pulse 88   Temp 98.1 °F (36.7 °C) (Oral)   Resp 14   Ht 5' 5" (1.651 m)   Wt 67.1 kg (148 lb)   LMP  (LMP Unknown) Comment: BEAU/ NO BSO  1986  SpO2 97%   Breastfeeding No   BMI 24.63 kg/m²     Labs Reviewed and Include    Recent Labs   Lab 01/24/24  0219   *   CALCIUM 7.9*   ALBUMIN 1.8*   PROT 4.4*   *   K 4.4   CO2 23      BUN 75*   CREATININE 3.3*   ALKPHOS 75   ALT 10   AST 17   BILITOT 1.0     Lab Results   Component Value Date    WBC 17.21 (H) 01/24/2024    HGB 8.2 (L) 01/24/2024    HCT 24.2 (L) 01/24/2024    MCV 93 01/24/2024     01/24/2024     No results for input(s): "TSH", "FREET4" in the last 168 hours.  Lab Results   Component Value Date    HGBA1C 4.9 11/15/2023       Nutritional status:   Body mass index is 24.63 kg/m².  Lab Results   Component Value Date    ALBUMIN 1.8 (L) 01/24/2024    ALBUMIN 1.7 (L) 01/23/2024    ALBUMIN 1.8 (L) 01/22/2024     No results found for: "PREALBUMIN"    Estimated Creatinine Clearance: 13.7 mL/min (A) (based on SCr of 3.3 mg/dL (H)).    Accu-Checks  Recent Labs     01/22/24  0810 01/22/24  1159 01/22/24  1635 01/22/24  2127 01/23/24  0759 01/23/24  1106 01/23/24  1544 01/23/24  1547 01/23/24 2019 01/24/24  0742   POCTGLUCOSE 165* 177* 124* 88 224* 250* 322* 316* 221* 191*       Current Medications and/or Treatments Impacting Glycemic " Control  Immunotherapy:    Immunosuppressants       None          Steroids:   Hormones (From admission, onward)      None          Pressors:    Autonomic Drugs (From admission, onward)      Start     Stop Route Frequency Ordered    01/23/24 1715  midodrine tablet 10 mg         -- Oral 2 times daily with meals 01/23/24 1315    01/22/24 1045  metoprolol tartrate (LOPRESSOR) split tablet 12.5 mg         -- Oral 2 times daily 01/22/24 0932    01/22/24 0930  midodrine tablet 15 mg         -- Oral As needed (PRN) 01/22/24 0930          Hyperglycemia/Diabetes Medications:   Antihyperglycemics (From admission, onward)      Start     Stop Route Frequency Ordered    01/24/24 0900  insulin detemir U-100 (Levemir) pen 7 Units         -- SubQ Daily 01/24/24 0855    01/24/24 0715  insulin aspart U-100 pen 3 Units         -- SubQ 3 times daily with meals 01/23/24 2103    01/19/24 1232  insulin aspart U-100 pen 0-10 Units         -- SubQ Before meals & nightly PRN 01/19/24 1232

## 2024-01-24 NOTE — PT/OT/SLP PROGRESS
Physical Therapy      Patient Name:  Lily Green   MRN:  [id number]    Patient not seen today secondary to: per nsg, EGD and HD scheduled for today. Will follow-up 1/25 for progressive mobility.    1/24/2024

## 2024-01-24 NOTE — PROGRESS NOTES
[facility]  Critical Care - Surgery  Progress Note    Patient Name: Lily Green  MRN: [id number]  Admission Date: 1/2/2024  Hospital Length of Stay: 22 days  Code Status: Full Code  Attending Provider: Tan Thomson MD  Primary Care Provider: Pavel Calixto MD   Principal Problem: Malignant carcinoid tumor of bronchus    Subjective:     Interval History/Significant Events: NAEON. Yesterday around 1300, patient had pre-syncopal/syncopal episode during bowel movement. Transferred back to [facility] and patient recovered. Complaining of abdominal pain. At 1530 called by nurse that patient is passing large volume bloody stools. Heparin discontinued, GI consulted, labs drawn. GI plans for EGD today and to plan future colonoscopy. Protamine given for elevated aPTT. CTA GI bleed study obtained without visible source of hemorrhage. 2u pRBC transfused for Hb 5.5.    Overnight blood pressure stable. This morning, patient awake in bed and much more alert than she has been in the recent few days. Explained the game plan for the day. She understands and is agreeable to the plan.     Follow-up For: Procedure(s) (LRB):  INSERTION, CARDIAC PACEMAKER, LEADLESS (N/A)    Post-Operative Day: 8 Days Post-Op    Objective:     Vital Signs (Most Recent):  Temp: 98.1 °F (36.7 °C) (01/24/24 0700)  Pulse: 82 (01/24/24 0853)  Resp: 15 (01/24/24 0853)  BP: 124/78 (01/24/24 0800)  SpO2: 98 % (01/24/24 0853) Vital Signs (24h Range):  Temp:  [97.5 °F (36.4 °C)-98.1 °F (36.7 °C)] 98.1 °F (36.7 °C)  Pulse:  [] 82  Resp:  [14-67] 15  SpO2:  [66 %-100 %] 98 %  BP: ()/() 124/78     Weight: 67.1 kg (148 lb)  Body mass index is 24.63 kg/m².      Intake/Output Summary (Last 24 hours) at 1/24/2024 0902  Last data filed at 1/24/2024 0705  Gross per 24 hour   Intake 346 ml   Output 640 ml   Net -294 ml          Physical Exam       Vents:  Oxygen Concentration (%): 24 (01/15/24  0200)    Lines/Drains/Airways       Central Venous Catheter Line  Duration                  Hemodialysis Catheter 01/25/23 1501 right internal jugular 363 days              Drain  Duration                  Chest Tube 01/17/24 1504 Right Pleural 10 Fr. 6 days         Chest Tube 01/18/24 1537 Left Pleural 10 Fr. 5 days              Peripheral Intravenous Line  Duration                  Hemodialysis AV Fistula 09/01/23 0800 Left forearm 145 days         Midline Catheter Insertion/Assessment  - Single Lumen 01/22/24 1707 Right basilic vein (medial side of arm) 18g x 10cm 1 day                    Significant Labs:    CBC/Anemia Profile:  Recent Labs   Lab 01/23/24  1544 01/23/24  2347 01/24/24  0738   WBC 15.62* 16.26* 17.21*   HGB 5.5* 8.2* 8.2*   HCT 17.8* 24.8* 24.2*    165 194   MCV 95 94 93   RDW 22.0* 17.8* 18.6*        Chemistries:  Recent Labs   Lab 01/22/24  1232 01/23/24  0500 01/24/24  0219   NA  --  136 135*   K  --  4.0 4.4   CL  --  103 106   CO2  --  26 23   BUN  --  34* 75*   CREATININE  --  2.7* 3.3*   CALCIUM  --  7.8* 7.9*   ALBUMIN  --  1.7* 1.8*   PROT  --  4.7* 4.4*   BILITOT  --  0.3 1.0   ALKPHOS  --  93 75   ALT  --  5* 10   AST  --  13 17   MG 2.2 1.9 2.0   PHOS 2.6* 2.4* 2.7       All pertinent labs within the past 24 hours have been reviewed.    Significant Imaging:  I have reviewed all pertinent imaging results/findings within the past 24 hours.  Assessment/Plan:     Cardiac/Vascular  Atrial fibrillation  74 yo female with 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 Stepped up to the unit for persistent afib rvr. Had SBO (now resolved). 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  Pacemaker placed on 01/16. R Chest tube placed w IR on 1/17. L sided chest tube placed w IR on 1/18. CXR stable. Pleural cultures NGTD.      Neuro/Psych:   -- Sedation: none  -- Pain:  Lidocaine patch   -- has anxiety, not on medications at home   - started on venlafaxine 37.5 mg QD     Cards:   -- BP goals SBP >90, MAPs >55   - Midodrine 5mg BID   - Midodrine 15mg additional with HD  -- Afib RVR, last occurred on 1/19/24 after HD   - Now back in NSR   - continue metoprolol tartrate 12.5 mg BID, hold if soft BP   - continue heparin gtt for anticoagulation   - prn iv metoprolol for sustained RVR > 130's   - If IV Metop fails or if hypotensive start Amio  -- Asystolic arrest, now s/p PPM 1/16 rate set to 60 bmp   - 1/12/24 patient asystole with 1 round of compressions and achievement of ROSC. Multiple bradycardic events prior to which had self resolved. STAT TVP placement unsuccessful 2/2 central venous stenosis. 2nd episode of asystole resolved after minimal compressions. 3rd episode of asystole resolved with singular compression.   - S/p TVP via groin in Cath lab 1/12, removed 1/16   - PPM 1/16 @ 60, heparin gtt held x 5 days   - high digoxin level now s/p digibind 1/12 and converted to NSR after administration  - dig level now WNL x 2     Pulm:   -- Goal O2 sat > 90%   - satting 100% on RA  -- Surgical chest tube d/c'd 1/8/24  -- CXR with pleural effusions and RLL opacification  -- CT chest (1/13): R worse than L pleural effusion, pericardial effusion   -- IR placed bilateral chest tubes for pleural effusions   -- R CT placed 1/17   -- L CT placed 1/18  -- continue incentive spirometry      Renal:  -- history of ESRD, dialysis dependent   - HD per nephrology, additional midodrine 15mg On Call PRN to be administered prior to HD   - AVF okay to use per vascular  -- trend BUN/Cr  -- holding sevelamer in setting of poor PO intake and low daily phos  -- patient is anuric vs oliguric  -- permacath in place last exchanged 10/2023  -- oxybutynin to reduce bladder spasm possibly triggering eulogio episodes    Recent Labs   Lab 01/22/24  0242 01/23/24  0500 01/24/24  0219   BUN 47* 34* 75*   CREATININE 3.7*  2.7* 3.3*           FEN / GI:   -- CT on 1/8/24 demonstrating SBO, now resolved  -- US on 1/8/24 showing distended gallbladder without signs of acute cholecystitis   - NGT removed  - Equivocal GGC 1/10  -- 1/23/24 had multiple large volume bloody bowel movements.   - Heparin stopped and protamine administered, GI consulted.    - EGD planned for 1/24/24   - IV PPI BID  -- Replace lytes as needed  -- Nutrition: NPO for GI bleed  - Dietician consulted for complex dietary restriction  - home appetite stimulant dronabinol 5 mg BID  -- Bowel regiment: miralax and doc-senna      ID:   -- Tm: afebrile; WBC down trending  -- C/f HAP with RLL consolidation, ID following  -- MRSA neg, dc vanc  -- zosyn broadened to meropenem given pleural effusions, completed Merrem on 1/20/24  -- Start ceftriaxone 2 gm IV daily plus PO metronidazole 500 mg BID on 1/21/24  -- Transitioned ceftriaxone to cefpodoxime (renally dosed)   -- Continue flagyl 500 mg BID and cefpodoxime 200 mg QHS for 4 weeks (end date: 2/15/24)  -- blood cultures with 1 bottle growing staph epi, likely a contaminant  -- pleural cultures (1/18): NGTD    Recent Labs   Lab 01/23/24  1544 01/23/24  2347 01/24/24  0738   WBC 15.62* 16.26* 17.21*           Heme/Onc:  -- Daily CBC  -- heparin gtt for A-fib, held 5 days after PPM 1/16, restarted 1/22, stopped 1/23/24 after bloody stools  -- S/p 1 unit pRBC's on 1/20/24 for Hb 6.8  -- s/p 2u pRBC on 1/23/24 for Hb of 5.5 after bloody stools    Recent Labs   Lab 01/22/24  0811 01/22/24  1536 01/23/24  1104 01/23/24  1221 01/23/24  1544 01/23/24  2347 01/24/24  0738   HGB  --    < >  --   --  5.5* 8.2* 8.2*   PLT  --    < >  --   --  225 165 194   APTT 22.9   < > >150.0* 69.0* 80.9*  --   --    INR 1.1  --   --   --  1.3*  --   --     < > = values in this interval not displayed.           Endo:   -- history of diabetes  -- Gluc goal 140-180  -- detemir 6u QD  -- SSI      PPx:   Feeding: NPO in setting of GI  bleed  Analgesia/Sedation: Pain is controlled, no sedation  Thromboembolic prevention: heparin off 2/2 GI bleed  HOB >30: yes  Stress Ulcer ppx: pantoprazole IV BID  Glucose control: Critical care goal 140-180 g/dl, detemir and SSI  Bowel reg: miralax and doc-senna  Invasive Lines/Drains/Airway: Permacath, Bilateral chest tubes, PIV, midline  Deescalation: transition HD to AVF instead of permacath        Dispo/Code Status/Palliative:   -- Full Code  -- Continue ICU care    Critical care was time spent personally by me on the following activities: development of treatment plan with patient or surrogate and bedside caregivers, discussions with consultants, evaluation of patient's response to treatment, examination of patient, ordering and performing treatments and interventions, ordering and review of laboratory studies, ordering and review of radiographic studies, pulse oximetry, re-evaluation of patient's condition.  This critical care time did not overlap with that of any other provider or involve time for any procedures.     Prabhu Esqueda MD  Critical Care - Surgery  [facility]

## 2024-01-24 NOTE — PLAN OF CARE
"SICU PLAN OF CARE NOTE    Dx: Malignant carcinoid tumor of bronchus    Goals of Care:  MAP >55, MAP>90, <180    Vital Signs:  BP (!) 160/72   Pulse 93   Temp 98 °F (36.7 °C) (Oral)   Resp (!) 30   Ht 5' 5" (1.651 m)   Wt 67.8 kg (149 lb 8 oz)   LMP  (LMP Unknown) Comment: BEAU/ NO BSO  1986  SpO2 99%   Breastfeeding No   BMI 24.88 kg/m²     Cardiac:  NSR (v-paced)    Resp:  SpO2 % on 2L NC    Neuro:  AAO x4, Follows Commands, and Moves All Extremities. Flat affect, depressed.    Urine Output:  Anuric 0 cc/shift    BM x6 (bloody)    Drains:  Chest Tube, total output 820 cc /  shift    Diet:  Regular Diet, gluten free, cardiac     Labs/Accuchecks:  daily labs    Skin:  Right arm weeping. All skin remains free from injury.  Patient turned Q2. ICU bed working correctly.    Shift Events:  No oral intake, no appetite. up in chair until 1300. Syncopal episode w/ vasovagal event while trying to go to the commode. Bloody bowel movements noted. MD notified. Labs sent. H/H 5.5/17.5. Heparin gtt stopped. 2 units of PRBC and 1 unit of FFP ordered. CTA tonight and egd  tomorrow morning.  See flowsheet for further assessment and details.  Family updated on current condition and plan of care, questions answered, and emotional support provided.     "

## 2024-01-24 NOTE — ASSESSMENT & PLAN NOTE
BG goal: 140-180    - Levemir 7 units daily (20% increase due to fasting blood glucose above goal)   - Novolog 3 units with meals.   - Novolog Mod dose correction with ISF 25 starting at 150 prn   - POCT Glucose before meals and at bedtime   - Hypoglycemia protocol in place      ** Please notify Endocrine for any change and/or advance in diet**  ** Please call Endocrine for any BG related issues **     Discharge Planning:   TBD. Please notify endocrinology prior to discharge.

## 2024-01-24 NOTE — ASSESSMENT & PLAN NOTE
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 On HD ~ 1 year. Last HD prior to presentation 1/1/24. Pt has NELY AVF placed on 10/23 that has not been cleared by vascular. Currently using RIJ TDC.     During her admission: stepped up to ICU for Afib with RVR. Was started on BB, amio and digoxin. However 1/12/24, had multiple episodes of eulogio with few asystole requiring chest compressions. Digoxin level elevated and dig-meaghan was given with resulting decrease in levels.     Nephrology History  iHD Schedule: TTS   Unit/MD: [facility]/ Dr. Vyas  Duration: 3.5 hours   EDW: 58 kg   Access: RIJ TDC  Residual Renal Function: minimal       Volume status:  Hypervolemic, negative 200 ml/24h.    Assessment:     -plan for HD today.  UFG 1 L. Temperature and Ca bath adjusted for BP support.  -midodrine to be given prior to HD  -Strict ins and outs  -Avoid nephrotoxic agents if possible and renally dose medications  -Avoid drastic hemodynamic changes if possible

## 2024-01-24 NOTE — CARE UPDATE
I have reviewed the chart of Lily Cervantes Nhaneusebia and participated in the care of the patient who is hospitalized for the following:    Active Hospital Problems    Diagnosis    *Malignant carcinoid tumor of bronchus    GIB (gastrointestinal bleeding)    Syncope    Carcinoid tumor of lung    Parapneumonic effusion    Bradycardia    Anticoagulated on heparin    Anuria    A-V fistula    Atrial fibrillation    ESRD (end stage renal disease)    On supplemental oxygen by nasal cannula    Type 2 diabetes mellitus with diabetic polyneuropathy, without long-term current use of insulin      I have reviewed Lily Cervantes Norma with the multidisciplinary team during rounds.      Enedelia Prather, BRITTANI  Unit-Based [facility]

## 2024-01-24 NOTE — SUBJECTIVE & OBJECTIVE
Interval History/Significant Events: NAEON. Yesterday around 1300, patient had pre-syncopal/syncopal episode during bowel movement. Transferred back to [facility] and patient recovered. Complaining of abdominal pain. At 1530 called by nurse that patient is passing large volume bloody stools. Heparin discontinued, GI consulted, labs drawn. GI plans for EGD today and to plan future colonoscopy. Protamine given for elevated aPTT. CTA GI bleed study obtained without visible source of hemorrhage. 2u pRBC transfused for Hb 5.5.    Overnight blood pressure stable. This morning, patient awake in bed and much more alert than she has been in the recent few days. Explained the game plan for the day. She understands and is agreeable to the plan.     Follow-up For: Procedure(s) (LRB):  INSERTION, CARDIAC PACEMAKER, LEADLESS (N/A)    Post-Operative Day: 8 Days Post-Op    Objective:     Vital Signs (Most Recent):  Temp: 98.1 °F (36.7 °C) (01/24/24 0700)  Pulse: 82 (01/24/24 0853)  Resp: 15 (01/24/24 0853)  BP: 124/78 (01/24/24 0800)  SpO2: 98 % (01/24/24 0853) Vital Signs (24h Range):  Temp:  [97.5 °F (36.4 °C)-98.1 °F (36.7 °C)] 98.1 °F (36.7 °C)  Pulse:  [] 82  Resp:  [14-67] 15  SpO2:  [66 %-100 %] 98 %  BP: ()/() 124/78     Weight: 67.1 kg (148 lb)  Body mass index is 24.63 kg/m².      Intake/Output Summary (Last 24 hours) at 1/24/2024 0902  Last data filed at 1/24/2024 0705  Gross per 24 hour   Intake 346 ml   Output 640 ml   Net -294 ml          Physical Exam       Vents:  Oxygen Concentration (%): 24 (01/15/24 0200)    Lines/Drains/Airways       Central Venous Catheter Line  Duration                  Hemodialysis Catheter 01/25/23 1501 right internal jugular 363 days              Drain  Duration                  Chest Tube 01/17/24 1504 Right Pleural 10 Fr. 6 days         Chest Tube 01/18/24 1537 Left Pleural 10 Fr. 5 days              Peripheral Intravenous Line  Duration                  Hemodialysis AV Fistula  09/01/23 0800 Left forearm 145 days         Midline Catheter Insertion/Assessment  - Single Lumen 01/22/24 1707 Right basilic vein (medial side of arm) 18g x 10cm 1 day                    Significant Labs:    CBC/Anemia Profile:  Recent Labs   Lab 01/23/24  1544 01/23/24  2347 01/24/24  0738   WBC 15.62* 16.26* 17.21*   HGB 5.5* 8.2* 8.2*   HCT 17.8* 24.8* 24.2*    165 194   MCV 95 94 93   RDW 22.0* 17.8* 18.6*        Chemistries:  Recent Labs   Lab 01/22/24  1232 01/23/24  0500 01/24/24  0219   NA  --  136 135*   K  --  4.0 4.4   CL  --  103 106   CO2  --  26 23   BUN  --  34* 75*   CREATININE  --  2.7* 3.3*   CALCIUM  --  7.8* 7.9*   ALBUMIN  --  1.7* 1.8*   PROT  --  4.7* 4.4*   BILITOT  --  0.3 1.0   ALKPHOS  --  93 75   ALT  --  5* 10   AST  --  13 17   MG 2.2 1.9 2.0   PHOS 2.6* 2.4* 2.7       All pertinent labs within the past 24 hours have been reviewed.    Significant Imaging:  I have reviewed all pertinent imaging results/findings within the past 24 hours.

## 2024-01-24 NOTE — PROGRESS NOTES
[facility]  Thoracic Surgery  Progress Note    Subjective:     History of Present Illness:  No notes on file    Post-Op Info:  Procedure(s) (LRB):  EGD (ESOPHAGOGASTRODUODENOSCOPY) (N/A)   Day of Surgery     Interval History:   GI bleed yesterday requiring 2uPRBC, 1 uFFP, protamine and heparin holding. EGD today.   Otherwise doing okay       Medications:  Continuous Infusions:        Scheduled Meds:   sodium chloride 0.9%   Intravenous Once    cefpodoxime  200 mg Oral QHS    droNABinol  5 mg Oral BID    epoetin noble (PROCRIT) injection  4,000 Units Intravenous Every Mon, Wed, Fri    erythromycin ethylsuccinate  200 mg Oral QID (WM & HS)    insulin aspart U-100  3 Units Subcutaneous TIDWM    insulin detemir U-100  7 Units Subcutaneous Daily    LIDOcaine  1 patch Transdermal Q24H    metoprolol tartrate  12.5 mg Oral BID    metroNIDAZOLE  500 mg Oral Q12H    midodrine  10 mg Oral BID WM    nystatin  500,000 Units Oral QID (WM & HS)    oxybutynin  5 mg Oral TID    pantoprazole  40 mg Intravenous BID    polyethylene glycol  17 g Oral Daily    senna-docusate 8.6-50 mg  1 tablet Oral Daily    sodium chloride 0.9%  10 mL Intravenous Q6H    venlafaxine  37.5 mg Oral Daily     PRN Meds:sodium chloride 0.9%, acetaminophen, albuterol sulfate, bisacodyL, dextrose 10%, dextrose 10%, glucagon (human recombinant), glucose, glucose, heparin (porcine), hydrALAZINE, insulin aspart U-100, midodrine, ondansetron, sodium chloride 0.9%, Flushing PICC/Midline Protocol **AND** sodium chloride 0.9% **AND** sodium chloride 0.9%     Review of patient's allergies indicates:   Allergen Reactions    Crestor [rosuvastatin] Swelling    Gluten protein      Objective:     Vital Signs (Most Recent):  Temp: 97.7 °F (36.5 °C) (01/24/24 1100)  Pulse: 74 (01/24/24 1300)  Resp: 12 (01/24/24 1300)  BP: (!) 125/58 (01/24/24 1300)  SpO2: 100 % (01/24/24 1300) Vital Signs (24h Range):  Temp:  [97.5 °F (36.4 °C)-98.1 °F (36.7 °C)] 97.7  °F (36.5 °C)  Pulse:  [74-98] 74  Resp:  [11-32] 12  SpO2:  [95 %-100 %] 100 %  BP: (105-202)/() 125/58     Intake/Output - Last 3 Shifts         01/22 0700  01/23 0659 01/23 0700 01/24 0659 01/24 0700 01/25 0659    P.O. 720 282 200    I.V. (mL/kg) 201.1 (3) 18.6 (0.3)     Blood  286     Other 600      IV Piggyback 132.7  50    Total Intake(mL/kg) 1653.7 (24.4) 586.6 (8.7) 250 (3.7)    Urine (mL/kg/hr)  0 (0)     Other 900      Stool  0     Chest Tube 820 620 90    Total Output 1720 620 90    Net -66.3 -33.4 +160           Urine Occurrence  1 x     Stool Occurrence  7 x             SpO2: 100 %       Physical Exam  Vitals and nursing note reviewed.   Constitutional:       Appearance: Normal appearance.   HENT:      Head: Normocephalic and atraumatic.      Nose: Nose normal.   Cardiovascular:      Rate and Rhythm: Normal rate and regular rhythm.      Pulses: Normal pulses.   Pulmonary:      Effort: Pulmonary effort is normal. No respiratory distress.      Comments: Right posterior chest incision c/d/I    Right chest permacath    Right Chest tube, 10 fr, serosanguinous output, waterseal   Left chest tube, 10 fr, serosanguinous output, waterseal   Abdominal:      General: Abdomen is flat. There is no distension.      Palpations: Abdomen is soft.      Tenderness: There is no abdominal tenderness.      Comments: Soft, nontender abdomen   Musculoskeletal:      Right lower leg: Edema present.      Left lower leg: Edema present.   Skin:     General: Skin is warm and dry.      Coloration: Skin is pale.      Comments: Groin soft, dressings c/d/i   Neurological:      General: No focal deficit present.      Mental Status: She is alert.            Significant Labs:  CBC:   Recent Labs   Lab 01/24/24  0738   WBC 17.21*   RBC 2.60*   HGB 8.2*   HCT 24.2*      MCV 93   MCH 31.5*   MCHC 33.9         Significant Diagnostics:  I have reviewed and interpreted all pertinent imaging results/findings within the past 24  hours.    VTE Risk Mitigation (From admission, onward)           Ordered     IP VTE HIGH RISK PATIENT  Once         01/23/24 1527     heparin 25,000 units in dextrose 5% 250 mL (100 units/mL) infusion LOW INTENSITY nomogram - [facility]  Continuous        Question:  Begin at (units/kg/hr)  Answer:  16    01/15/24 1038     heparin (porcine) injection 1,000 Units  As needed (PRN)         01/11/24 1351     heparin (porcine) injection 1,000 Units  As needed (PRN)         01/03/24 1123     Place CARMELITA hose  Until discontinued         01/02/24 1226     Place sequential compression device  Until discontinued         01/02/24 1226                  Assessment/Plan:     * Malignant carcinoid tumor of bronchus  74 yo female with ESRD (TTS), DDD, benign essential tremor, meniere's disease, HTN, HLD, DM2, GERD, Celiac's, IBS and 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 Stepped up to the ICU 1/5 for afib RVR which was refractory to medical management and c/b development of tachy-eulogio syndrome now s/p micra placement 1/16 for tachy-eulogio syndrome in the setting of RVR. Did have brief SBO type picture 1/7 which resolved with conservative management.     - s/p R thoracotomy with R lower lobectomy for carcinoid w/ MLND; IPV injury requiring repair. CT removed post operatively.  - Refractory afib RVR requiring ICU step up 1/05, s/p chemical cardioversion initially with recurrence c/b developing tachy-eulogio syndrome    - cards and EP following, appreciate assistance    - AC indicated 2/2 intermittent atrial fib episodes and chadsvasc score of 2     - s/p TVP placement and now micra implantation 1/16    - Heparin gtt resumed 1/22; will need to transition to eliquis likely following CT removal; held 1/23 2/2 GI bleed   - CT chest 1/13 w/ bilateral pleural effusion (R>L), pericardial effusion,   - s/p R chest tube placement with IR 1/17, L chest tube placement 1/18;  Keep today and may stay to waterseal; monitr output   - Daily CXR while CT are in place   - Pulmonary toilet: IS, Acapella, Mucomyst daily   - Infectious work up 2/2 leukocytosis. She remains afebrile. Effusions and compressive atelectasis also likely contributing to leukocytosis.Trended up to 30k 1/16, now downtrending. Zosyn 1/11-1/15; Meropenam 1/16 - 1/20; Plan for Ceftriaxone and metronidazole to start 1/21 per ID. 4 week abx plan for empiric treatment of presumed parapneumonic effusion     - ID following, appreciate assistance. Reconsult following thora results.   - Bcx 1/11: Staph, probable contaminant  - Bcx1/12: NGTD   - Rapid blood PCR: staph epi  - Sputum Cx 1/11: klebsiella & e. Coli  - R pleural fluid Cx 1/17: NGTD  - SBO type episode 1/07 requiring NGT decompression, which resolved with conservative management   - GI bleed 1/23/2024 - heparin held, 2uPRBC, 1uFFP, heparin reversed. GI consulted    - EGD 1/24  - Renal, gluten free diet, novasource renal BID; marinol for appetite   - Bowel regimen, PPI  - Nephrology following, appreciate assistance. CRRT 1/17 overnight. HD trial 1/19 passed but requiring midodrine, 1.5 L LR, metoprolol for RVR. HD 1/22 overnight tolerated much better with midodrine given prior    - LUE AVF created 10.2023, US 1/22/2023 with good flow metrics, okay to use per vascular   - Oxybutinin for bladder spasms   - MM pain control as needed  - Continue PT/OT, mobilization, likely post hospital placement         Dispo: Okay for step down today         Kat Flores MD  Thoracic Surgery  [facility]

## 2024-01-24 NOTE — HPI
"Lily Green is a 73 year old female for whom GI is consulted with concerns for GIB. 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 History provided by spouse as patient clearly in distress during assessment.    [facility] for further evaluation of her carcinoid tumor now s/p RML thoracotomy and resection. Post-operative course complicated by bilateral pleural effusion w/right chest tube placement 1/17, L chest tube placement 1/18, SBO that resolved with conservative management, refractory A-fib with tachy-eulogio syndrome s/p micra placement 1/16 started on low-intensity heparin gtt (resumed 1/22), sepsis treated with IV abx. On 1/23, she had a syncopal episode while having a bowel movement, thought to be vasovagal as well as hypotensive episodes with HD requiring midodrine. GI contacted on 1/23 after she had a large episode of black stools with associated dark clots, no bright red blood. Hgb 5.5 (previously 7.4 that morning), leukocytosis (15), INR 1.3, PTT 80.9, otherwise nml platelets and fibrinogen. She was HDS during episode of bleeding, was transfused 3 U pRBCs, FFP and Protamine. CTA negative for active bleeding. Since the Protamine was administered, she has had no further episodes of bleeding and remains HDS. Repeat Hgb 8.2 this morning.     Most recent endoscopic history:  -Colonoscopy 4/2018: tortuous colon, stool throughout colon so nondiagnostic.   -Flex sig 6/2023 for "fistula of intestine": rectal biopsies with focally active proctitis.  "

## 2024-01-24 NOTE — SUBJECTIVE & OBJECTIVE
Interval History: no distress this AM. Concerns for GIB - plans for EGD today. Hgb 8.2.  Electrolytes non emergent. Plans for HD today.     Review of patient's allergies indicates:   Allergen Reactions    Crestor [rosuvastatin] Swelling    Gluten protein      Current Facility-Administered Medications   Medication Frequency    0.9%  NaCl infusion PRN    0.9%  NaCl infusion Once    acetaminophen tablet 650 mg Q4H PRN    albuterol sulfate nebulizer solution 2.5 mg Q4H PRN    bisacodyL suppository 10 mg Daily PRN    cefpodoxime tablet 200 mg QHS    dextrose 10% bolus 125 mL 125 mL PRN    dextrose 10% bolus 250 mL 250 mL PRN    droNABinol capsule 5 mg BID    epoetin noble injection 4,000 Units Every Mon, Wed, Fri    glucagon (human recombinant) injection 1 mg PRN    glucose chewable tablet 16 g PRN    glucose chewable tablet 24 g PRN    heparin (porcine) injection 1,000 Units PRN    hydrALAZINE injection 10 mg Q6H PRN    insulin aspart U-100 pen 0-10 Units QID (AC + HS) PRN    insulin aspart U-100 pen 3 Units TIDWM    insulin detemir U-100 (Levemir) pen 7 Units Daily    LIDOcaine 5 % patch 1 patch Q24H    metoprolol tartrate (LOPRESSOR) split tablet 12.5 mg BID    metroNIDAZOLE tablet 500 mg Q12H    midodrine tablet 10 mg BID WM    midodrine tablet 15 mg PRN    ondansetron injection 8 mg Q8H PRN    oxybutynin tablet 5 mg TID    pantoprazole injection 40 mg BID    polyethylene glycol packet 17 g Daily    senna-docusate 8.6-50 mg per tablet 1 tablet Daily    sodium chloride 0.9% flush 10 mL PRN    sodium chloride 0.9% flush 10 mL Q6H    And    sodium chloride 0.9% flush 10 mL PRN    venlafaxine tablet 37.5 mg Daily       Objective:     Vital Signs (Most Recent):  Temp: 97.7 °F (36.5 °C) (01/24/24 1100)  Pulse: 74 (01/24/24 1300)  Resp: 12 (01/24/24 1300)  BP: (!) 125/58 (01/24/24 1300)  SpO2: 100 % (01/24/24 1300) Vital Signs (24h Range):  Temp:  [97.5 °F (36.4 °C)-98.1 °F (36.7 °C)] 97.7 °F (36.5 °C)  Pulse:  [74-98]  74  Resp:  [11-45] 12  SpO2:  [95 %-100 %] 100 %  BP: (105-202)/() 125/58     Weight: 67.1 kg (148 lb) (01/24/24 0334)  Body mass index is 24.63 kg/m².  Body surface area is 1.75 meters squared.    I/O last 3 completed shifts:  In: 1270.2 [P.O.:282; I.V.:102.2; Blood:286; Other:600]  Out: 1520 [Other:900; Chest Tube:620]     Physical Exam  Vitals and nursing note reviewed.   Constitutional:       Appearance: She is well-developed. She is ill-appearing.   HENT:      Head: Normocephalic and atraumatic.      Right Ear: External ear normal.      Left Ear: External ear normal.   Eyes:      General: No scleral icterus.        Right eye: No discharge.         Left eye: No discharge.      Conjunctiva/sclera: Conjunctivae normal.   Cardiovascular:      Rate and Rhythm: Normal rate and regular rhythm.      Heart sounds: Normal heart sounds. No murmur heard.     No friction rub. No gallop.   Pulmonary:      Effort: Pulmonary effort is normal. No respiratory distress.      Breath sounds: No stridor. Examination of the right-lower field reveals decreased breath sounds. Decreased breath sounds present. No wheezing or rales.   Abdominal:      General: Bowel sounds are normal.   Musculoskeletal:         General: No tenderness.      Right lower leg: Edema present.      Left lower leg: Edema present.   Skin:     General: Skin is warm and dry.   Neurological:      Mental Status: She is alert and oriented to person, place, and time.          Significant Labs:  CBC:   Recent Labs   Lab 01/24/24  0738   WBC 17.21*   RBC 2.60*   HGB 8.2*   HCT 24.2*      MCV 93   MCH 31.5*   MCHC 33.9     CMP:   Recent Labs   Lab 01/24/24  0219   *   CALCIUM 7.9*   ALBUMIN 1.8*   PROT 4.4*   *   K 4.4   CO2 23      BUN 75*   CREATININE 3.3*   ALKPHOS 75   ALT 10   AST 17   BILITOT 1.0     All labs within the past 24 hours have been reviewed.

## 2024-01-24 NOTE — SUBJECTIVE & OBJECTIVE
Interval History:   GI bleed yesterday requiring 2uPRBC, 1 uFFP, protamine and heparin holding. EGD today.   Otherwise doing okay       Medications:  Continuous Infusions:        Scheduled Meds:   sodium chloride 0.9%   Intravenous Once    cefpodoxime  200 mg Oral QHS    droNABinol  5 mg Oral BID    epoetin noble (PROCRIT) injection  4,000 Units Intravenous Every Mon, Wed, Fri    erythromycin ethylsuccinate  200 mg Oral QID (WM & HS)    insulin aspart U-100  3 Units Subcutaneous TIDWM    insulin detemir U-100  7 Units Subcutaneous Daily    LIDOcaine  1 patch Transdermal Q24H    metoprolol tartrate  12.5 mg Oral BID    metroNIDAZOLE  500 mg Oral Q12H    midodrine  10 mg Oral BID WM    nystatin  500,000 Units Oral QID (WM & HS)    oxybutynin  5 mg Oral TID    pantoprazole  40 mg Intravenous BID    polyethylene glycol  17 g Oral Daily    senna-docusate 8.6-50 mg  1 tablet Oral Daily    sodium chloride 0.9%  10 mL Intravenous Q6H    venlafaxine  37.5 mg Oral Daily     PRN Meds:sodium chloride 0.9%, acetaminophen, albuterol sulfate, bisacodyL, dextrose 10%, dextrose 10%, glucagon (human recombinant), glucose, glucose, heparin (porcine), hydrALAZINE, insulin aspart U-100, midodrine, ondansetron, sodium chloride 0.9%, Flushing PICC/Midline Protocol **AND** sodium chloride 0.9% **AND** sodium chloride 0.9%     Review of patient's allergies indicates:   Allergen Reactions    Crestor [rosuvastatin] Swelling    Gluten protein      Objective:     Vital Signs (Most Recent):  Temp: 97.7 °F (36.5 °C) (01/24/24 1100)  Pulse: 74 (01/24/24 1300)  Resp: 12 (01/24/24 1300)  BP: (!) 125/58 (01/24/24 1300)  SpO2: 100 % (01/24/24 1300) Vital Signs (24h Range):  Temp:  [97.5 °F (36.4 °C)-98.1 °F (36.7 °C)] 97.7 °F (36.5 °C)  Pulse:  [74-98] 74  Resp:  [11-32] 12  SpO2:  [95 %-100 %] 100 %  BP: (105-202)/() 125/58     Intake/Output - Last 3 Shifts         01/22 0700 01/23 0659 01/23 0700 01/24 0659 01/24 0700 01/25 0659    P.O. 720  282 200    I.V. (mL/kg) 201.1 (3) 18.6 (0.3)     Blood  286     Other 600      IV Piggyback 132.7  50    Total Intake(mL/kg) 1653.7 (24.4) 586.6 (8.7) 250 (3.7)    Urine (mL/kg/hr)  0 (0)     Other 900      Stool  0     Chest Tube 820 620 90    Total Output 1720 620 90    Net -66.3 -33.4 +160           Urine Occurrence  1 x     Stool Occurrence  7 x             SpO2: 100 %        Physical Exam  Vitals and nursing note reviewed.   Constitutional:       Appearance: Normal appearance.   HENT:      Head: Normocephalic and atraumatic.      Nose: Nose normal.   Cardiovascular:      Rate and Rhythm: Normal rate and regular rhythm.      Pulses: Normal pulses.   Pulmonary:      Effort: Pulmonary effort is normal. No respiratory distress.      Comments: Right posterior chest incision c/d/I    Right chest permacath    Right Chest tube, 10 fr, serosanguinous output, waterseal   Left chest tube, 10 fr, serosanguinous output, waterseal   Abdominal:      General: Abdomen is flat. There is no distension.      Palpations: Abdomen is soft.      Tenderness: There is no abdominal tenderness.      Comments: Soft, nontender abdomen   Musculoskeletal:      Right lower leg: Edema present.      Left lower leg: Edema present.   Skin:     General: Skin is warm and dry.      Coloration: Skin is pale.      Comments: Groin soft, dressings c/d/i   Neurological:      General: No focal deficit present.      Mental Status: She is alert.            Significant Labs:  CBC:   Recent Labs   Lab 01/24/24  0738   WBC 17.21*   RBC 2.60*   HGB 8.2*   HCT 24.2*      MCV 93   MCH 31.5*   MCHC 33.9         Significant Diagnostics:  I have reviewed and interpreted all pertinent imaging results/findings within the past 24 hours.    VTE Risk Mitigation (From admission, onward)           Ordered     IP VTE HIGH RISK PATIENT  Once         01/23/24 1527     heparin 25,000 units in dextrose 5% 250 mL (100 units/mL) infusion LOW INTENSITY nomogram - [facility]   Continuous        Question:  Begin at (units/kg/hr)  Answer:  16    01/15/24 1038     heparin (porcine) injection 1,000 Units  As needed (PRN)         01/11/24 1351     heparin (porcine) injection 1,000 Units  As needed (PRN)         01/03/24 1123     Place CARMELITA hose  Until discontinued         01/02/24 1226     Place sequential compression device  Until discontinued         01/02/24 1226

## 2024-01-24 NOTE — PT/OT/SLP PROGRESS
Occupational Therapy      Patient Name:  Lily Green   MRN:  [id number]    Pt with blood BM last night with EGD scheduled this date. OT to check status at later date.   1/24/2024

## 2024-01-24 NOTE — PROGRESS NOTES
[facility]  Nephrology  Progress Note    Patient Name: Lily Green  MRN: [id number]  Admission Date: 1/2/2024  Hospital Length of Stay: 22 days  Attending Provider: Tan Thomson MD   Primary Care Physician: Pavel Calixto MD  Principal Problem:Malignant carcinoid tumor of bronchus    Subjective:       Interval History: no distress this AM. Concerns for GIB - plans for EGD today. Hgb 8.2.  Electrolytes non emergent. Plans for HD today.     Review of patient's allergies indicates:   Allergen Reactions    Crestor [rosuvastatin] Swelling    Gluten protein      Current Facility-Administered Medications   Medication Frequency    0.9%  NaCl infusion PRN    0.9%  NaCl infusion Once    acetaminophen tablet 650 mg Q4H PRN    albuterol sulfate nebulizer solution 2.5 mg Q4H PRN    bisacodyL suppository 10 mg Daily PRN    cefpodoxime tablet 200 mg QHS    dextrose 10% bolus 125 mL 125 mL PRN    dextrose 10% bolus 250 mL 250 mL PRN    droNABinol capsule 5 mg BID    epoetin noble injection 4,000 Units Every Mon, Wed, Fri    glucagon (human recombinant) injection 1 mg PRN    glucose chewable tablet 16 g PRN    glucose chewable tablet 24 g PRN    heparin (porcine) injection 1,000 Units PRN    hydrALAZINE injection 10 mg Q6H PRN    insulin aspart U-100 pen 0-10 Units QID (AC + HS) PRN    insulin aspart U-100 pen 3 Units TIDWM    insulin detemir U-100 (Levemir) pen 7 Units Daily    LIDOcaine 5 % patch 1 patch Q24H    metoprolol tartrate (LOPRESSOR) split tablet 12.5 mg BID    metroNIDAZOLE tablet 500 mg Q12H    midodrine tablet 10 mg BID WM    midodrine tablet 15 mg PRN    ondansetron injection 8 mg Q8H PRN    oxybutynin tablet 5 mg TID    pantoprazole injection 40 mg BID    polyethylene glycol packet 17 g Daily    senna-docusate 8.6-50 mg per tablet 1 tablet Daily    sodium chloride 0.9% flush 10 mL PRN    sodium chloride 0.9% flush 10 mL Q6H    And    sodium chloride 0.9% flush 10 mL PRN     venlafaxine tablet 37.5 mg Daily       Objective:     Vital Signs (Most Recent):  Temp: 97.7 °F (36.5 °C) (01/24/24 1100)  Pulse: 74 (01/24/24 1300)  Resp: 12 (01/24/24 1300)  BP: (!) 125/58 (01/24/24 1300)  SpO2: 100 % (01/24/24 1300) Vital Signs (24h Range):  Temp:  [97.5 °F (36.4 °C)-98.1 °F (36.7 °C)] 97.7 °F (36.5 °C)  Pulse:  [74-98] 74  Resp:  [11-45] 12  SpO2:  [95 %-100 %] 100 %  BP: (105-202)/() 125/58     Weight: 67.1 kg (148 lb) (01/24/24 0334)  Body mass index is 24.63 kg/m².  Body surface area is 1.75 meters squared.    I/O last 3 completed shifts:  In: 1270.2 [P.O.:282; I.V.:102.2; Blood:286; Other:600]  Out: 1520 [Other:900; Chest Tube:620]     Physical Exam  Vitals and nursing note reviewed.   Constitutional:       Appearance: She is well-developed. She is ill-appearing.   HENT:      Head: Normocephalic and atraumatic.      Right Ear: External ear normal.      Left Ear: External ear normal.   Eyes:      General: No scleral icterus.        Right eye: No discharge.         Left eye: No discharge.      Conjunctiva/sclera: Conjunctivae normal.   Cardiovascular:      Rate and Rhythm: Normal rate and regular rhythm.      Heart sounds: Normal heart sounds. No murmur heard.     No friction rub. No gallop.   Pulmonary:      Effort: Pulmonary effort is normal. No respiratory distress.      Breath sounds: No stridor. Examination of the right-lower field reveals decreased breath sounds. Decreased breath sounds present. No wheezing or rales.   Abdominal:      General: Bowel sounds are normal.   Musculoskeletal:         General: No tenderness.      Right lower leg: Edema present.      Left lower leg: Edema present.   Skin:     General: Skin is warm and dry.   Neurological:      Mental Status: She is alert and oriented to person, place, and time.          Significant Labs:  CBC:   Recent Labs   Lab 01/24/24  0738   WBC 17.21*   RBC 2.60*   HGB 8.2*   HCT 24.2*      MCV 93   MCH 31.5*   MCHC 33.9      CMP:   Recent Labs   Lab 01/24/24  0219   *   CALCIUM 7.9*   ALBUMIN 1.8*   PROT 4.4*   *   K 4.4   CO2 23      BUN 75*   CREATININE 3.3*   ALKPHOS 75   ALT 10   AST 17   BILITOT 1.0     All labs within the past 24 hours have been reviewed.     Assessment/Plan:     Cardiac/Vascular  Atrial fibrillation  -management per primary     Renal/  ESRD (end stage renal disease)  S/p R thoracotomy due to RML carcinoid tumor. On HD ~ 1 year. Last HD prior to presentation 1/1/24. Pt has NELY AVF placed on 10/23 that has not been cleared by vascular. Currently using RIJ TDC.     During her admission: stepped up to ICU for Afib with RVR. Was started on BB, amio and digoxin. However 1/12/24, had multiple episodes of eulogio with few asystole requiring chest compressions. Digoxin level elevated and dig-meaghan was given with resulting decrease in levels.     Nephrology History  iHD Schedule: TTS   Unit/MD: [facility]/ Dr. Vyas  Duration: 3.5 hours   EDW: 58 kg   Access: RIJ TDC  Residual Renal Function: minimal       Volume status:  Hypervolemic, negative 200 ml/24h.    Assessment:     -plan for HD today.  UFG 1 L. Temperature and Ca bath adjusted for BP support.  -midodrine to be given prior to HD  -Strict ins and outs  -Avoid nephrotoxic agents if possible and renally dose medications  -Avoid drastic hemodynamic changes if possible      Oncology  * Malignant carcinoid tumor of bronchus  -management per primary         Thank you for your consult. I will follow-up with patient. Please contact us if you have any additional questions.    Makenzie Melissa, BRITTANI, FNP-C  Nephrology  [facility]

## 2024-01-24 NOTE — PLAN OF CARE
[facility]  Discharge Reassessment    Primary Care Provider: Pavel Calixto MD    Expected Discharge Date: 1/29/2024    Reassessment (most recent)       Discharge Reassessment - 01/24/24 1056          Discharge Reassessment    Assessment Type Discharge Planning Reassessment     Discharge Plan discussed with: Spouse/sig other;Patient;Caregiver     Communicated CAMPBELL with patient/caregiver Date not available/Unable to determine     Discharge Plan A Home;Home with family;Home Health     Discharge Plan B Skilled Nursing Facility     DME Needed Upon Discharge  other (see comments)   TBD    Transition of Care Barriers None     Why the patient remains in the hospital Requires continued medical care        Post-Acute Status    Post-Acute Authorization Placement     Post-Acute Placement Status Patient List Provided                     Per MD's Note, Interval History/Significant Events: NAEON. Yesterday around 1300, patient had pre-syncopal/syncopal episode during bowel movement. Transferred back to [facility] and patient recovered. Complaining of abdominal pain. At 1530 called by nurse that patient is passing large volume bloody stools. Heparin discontinued, GI consulted, labs drawn. GI plans for EGD today and to plan future colonoscopy. Protamine given for elevated aPTT. CTA GI bleed study obtained without visible source of hemorrhage. 2u pRBC transfused for Hb 5.5.     Overnight blood pressure stable. This morning, patient awake in bed and much more alert than she has been in the recent few days. Explained the game plan for the day. She understands and is agreeable to the plan.    Discharge Plan A and Plan B have been determined by review of patient's clinical status, future medical and therapeutic needs, and coverage/benefits for post-acute care in coordination with multidisciplinary team members.     Alberto Fitch LMSW  Case Management [facility]

## 2024-01-24 NOTE — PROGRESS NOTES
[facility]  Endocrinology  Progress Note    Admit Date: 1/2/2024     Reason for Consult: Management of T2DM, Hyperglycemia     Surgical Procedure and Date: 1/2/24--   LYMPHADENECTOMY (Right)  THORACOTOMY (Right)  THORACOTOMY, WITH INITIAL THERAPEUTIC WEDGE RESECTION OF LUNG (Right)    Diabetes diagnosis year: in her 50s    Home Diabetes Medications:    LDC SSI   Blood sugar 150 to 200, + 1 unit  Blood sugar 201 to 250, + 2 units  Blood sugar 251 to 300, + 3 units  Blood sugar 301 to 350, + 4 units   Blood sugar greater than 350, + 5 units    How often checking glucose at home? >4 x day   BG readings on regimen: 100-120s  Hypoglycemia on the regimen?  denies   Missed doses on regimen?  No    Diabetes Complications include:     Nephropathy     Complicating diabetes co morbidities:   ESRD and Glucocorticoid use       HPI:   Patient is a 73 y.o. female with DDD, benign essential tremor, meniere's disease, HTN, HLD, DM2, GERD, Celiac's, IBS who is found to have RML Carcinoid tumor.  Chest CT 10/18/23 revealed 1.2 x 1.1 cm right middle lobe nodule, previously 1.2 x 1.0 cm in July 2023. Underwent Robotic Bronchoscopy 10/20/23; path: RML nodule positive for well-differentiated neuroendocrine neoplasm, carcinoid tumor. PET/CT Cu64 11/17/23 showed known RML nodule with hypermetabolic activity. Now presents s/p thoracotomy with wedge resection of right lung. Endocrine consulted for BG management.         Interval HPI:   Overnight events: No acute events overnight. Patient in room 87508 CVICU/15557 CVICU A. Blood glucose improving. BG at and above goal on current insulin regimen (SSI, prandial, and basal insulin ). Steroid use- None. 8 Days Post-Op  Renal function- Abnormal - Creatinine 3.3   Vasopressors-  None       Endocrine will continue to follow and manage insulin orders inpatient.         Diet NPO Except for: Sips with Medication     Eating:   NPO  Nausea: No  Hypoglycemia and intervention:  "No  Fever: No  TPN and/or TF: No      BP (!) 156/67 (BP Location: Left leg, Patient Position: Lying)   Pulse 88   Temp 98.1 °F (36.7 °C) (Oral)   Resp 14   Ht 5' 5" (1.651 m)   Wt 67.1 kg (148 lb)   LMP  (LMP Unknown) Comment: BEAU/ NO BSO  1986  SpO2 97%   Breastfeeding No   BMI 24.63 kg/m²     Labs Reviewed and Include    Recent Labs   Lab 01/24/24  0219   *   CALCIUM 7.9*   ALBUMIN 1.8*   PROT 4.4*   *   K 4.4   CO2 23      BUN 75*   CREATININE 3.3*   ALKPHOS 75   ALT 10   AST 17   BILITOT 1.0     Lab Results   Component Value Date    WBC 17.21 (H) 01/24/2024    HGB 8.2 (L) 01/24/2024    HCT 24.2 (L) 01/24/2024    MCV 93 01/24/2024     01/24/2024     No results for input(s): "TSH", "FREET4" in the last 168 hours.  Lab Results   Component Value Date    HGBA1C 4.9 11/15/2023       Nutritional status:   Body mass index is 24.63 kg/m².  Lab Results   Component Value Date    ALBUMIN 1.8 (L) 01/24/2024    ALBUMIN 1.7 (L) 01/23/2024    ALBUMIN 1.8 (L) 01/22/2024     No results found for: "PREALBUMIN"    Estimated Creatinine Clearance: 13.7 mL/min (A) (based on SCr of 3.3 mg/dL (H)).    Accu-Checks  Recent Labs     01/22/24  0810 01/22/24  1159 01/22/24  1635 01/22/24  2127 01/23/24  0759 01/23/24  1106 01/23/24  1544 01/23/24  1547 01/23/24  2019 01/24/24  0742   POCTGLUCOSE 165* 177* 124* 88 224* 250* 322* 316* 221* 191*       Current Medications and/or Treatments Impacting Glycemic Control  Immunotherapy:    Immunosuppressants       None          Steroids:   Hormones (From admission, onward)      None          Pressors:    Autonomic Drugs (From admission, onward)      Start     Stop Route Frequency Ordered    01/23/24 1715  midodrine tablet 10 mg         -- Oral 2 times daily with meals 01/23/24 1315    01/22/24 1045  metoprolol tartrate (LOPRESSOR) split tablet 12.5 mg         -- Oral 2 times daily 01/22/24 0932    01/22/24 0930  midodrine tablet 15 mg         -- Oral As needed (PRN) " 01/22/24 0930          Hyperglycemia/Diabetes Medications:   Antihyperglycemics (From admission, onward)      Start     Stop Route Frequency Ordered    01/24/24 0900  insulin detemir U-100 (Levemir) pen 7 Units         -- SubQ Daily 01/24/24 0855    01/24/24 0715  insulin aspart U-100 pen 3 Units         -- SubQ 3 times daily with meals 01/23/24 2103    01/19/24 1232  insulin aspart U-100 pen 0-10 Units         -- SubQ Before meals & nightly PRN 01/19/24 1232            ASSESSMENT and PLAN    Renal/  ESRD (end stage renal disease)    Titrate insulin slowly to avoid hypoglycemia as the risk of hypoglycemia increases with decreased creatinine clearance.      Oncology  * Malignant carcinoid tumor of bronchus  Managed per primary team        Endocrine  Type 2 diabetes mellitus with diabetic polyneuropathy, without long-term current use of insulin  BG goal: 140-180    - Levemir 7 units daily (20% increase due to fasting blood glucose above goal)   - Novolog 3 units with meals.   - Novolog Mod dose correction with ISF 25 starting at 150 prn   - POCT Glucose before meals and at bedtime   - Hypoglycemia protocol in place      ** Please notify Endocrine for any change and/or advance in diet**  ** Please call Endocrine for any BG related issues **     Discharge Planning:   TBD. Please notify endocrinology prior to discharge.             Pranav Perry, DNP, FNP  Endocrinology  [facility]

## 2024-01-24 NOTE — CARE UPDATE
Care Update:     No acute events overnight. Patient in room 58182 CVICU/06846 CVICU A. Blood glucose stable on current inpatient regimen.No hypoglycemia noted over the past 24-hours. Endocrine will continue to follow and manage insulin orders inpatient.  7 Days Post-Op    Steroid use- None.   Renal function-   Lab Results   Component Value Date    CREATININE 2.7 (H) 01/23/2024        Diet Adult Regular (IDDSI Level 7) Gluten Free, Cardiac (Low Na/Chol); Standard Tray  Diet NPO Except for: Sips with Medication  Diet NPO Except for: Sips with Medication     POCT Glucose   Date Value Ref Range Status   01/23/2024 221 (H) 70 - 110 mg/dL Final   01/23/2024 316 (H) 70 - 110 mg/dL Final   01/23/2024 322 (H) 70 - 110 mg/dL Final   01/23/2024 250 (H) 70 - 110 mg/dL Final   01/23/2024 224 (H) 70 - 110 mg/dL Final   01/22/2024 124 (H) 70 - 110 mg/dL Final   01/22/2024 177 (H) 70 - 110 mg/dL Final   01/22/2024 165 (H) 70 - 110 mg/dL Final   01/21/2024 138 (H) 70 - 110 mg/dL Final   01/21/2024 183 (H) 70 - 110 mg/dL Final   01/21/2024 169 (H) 70 - 110 mg/dL Final     Lab Results   Component Value Date    HGBA1C 4.9 11/15/2023         Home Medications   Diabetes Medications               insulin aspart U-100 (NOVOLOG) 100 unit/mL (3 mL) InPn pen Inject before meals:  150-200=+1, 201-250=+2, 251-300=+3; 301-350=+4, over 350=+5 units    neomycin (MYCIFRADIN) 500 mg Tab             - Levemir 6 units daily   - Start Novolog 3 with meals due to BG excursions.   - Novolog Mod dose correction with ISF 25 starting at 150 prn  - POCT Glucose ac/hs  - Hypoglycemia protocol in place      ** Please notify Endocrine for any change and/or advance in diet**  ** Please call Endocrine for any BG related issues **     Discharge Planning:   TBD. Please notify endocrinology prior to discharge.       Pranav Perry DNP, FNP-C  Department of Endocrinology  Inpatient Glycemic Management

## 2024-01-24 NOTE — H&P
Short Stay Endoscopy History and Physical    PCP - Pavel Calixto MD  Referring Physician - Tan Thomson MD  [address] [place] [address]    Procedure - EGD  ASA - per anesthesia  Mallampati - per anesthesia  History of Anesthesia problems - no  Family history Anesthesia problems -  no   Plan of anesthesia - General    HPI  73 y.o. female  Reason for procedure:  Anemia due to GI blood loss [D50.0]       ROS:  Constitutional: No fevers, chills, No weight loss  CV: No chest pain  Pulm: No cough, No shortness of breath  GI: see HPI    Medical History:  has a past medical history of Anxiety, Celiac disease (05/2018), Celiac disease, Depression, Diabetes mellitus, Family history of breast cancer in mother, Hyperlipidemia, Hypertension, Meniere disease, Meniere's disease, unspecified ear, Menopause, Peptic ulcer, Reflux esophagitis, Urinary tract infection, Vaginal infection, and Vaginal Pap smear (04/07/2014).    Surgical History:  has a past surgical history that includes Tonsillectomy; Appendectomy; Dilation and curettage of uterus (1986); Hysterectomy (1987); Carpal tunnel release (Bilateral, 09/2017); Shoulder surgery (2009 & 2010); Colonoscopy (04/2018); Upper gastrointestinal endoscopy (04/2018); Dupuytren contracture release (Bilateral, 09/2017); Breast surgery; Injection of neurolytic agent around lumbar sympathetic nerve (N/A, 1/6/2022); Injection of neurolytic agent around lumbar sympathetic nerve (N/A, 8/25/2022); laparotomy, exploratory (N/A, 1/14/2023); laparotomy, exploratory (N/A, 1/16/2023); closure, colostomy (Left, 1/16/2023); Colostomy (Right, 1/16/2023); Insertion of tunneled central venous hemodialysis catheter (Right, 1/25/2023); removal, tunneled cath (Right, 1/25/2023); insertion, catheter, tunneled (Left, 1/28/2023); Removal of catheter (Right, 1/28/2023); robotic bronchoscopy (N/A, 10/20/2023); lymphadenectomy (Right, 1/2/2024); thoracotomy (Right, 1/2/2024); thoracotomy,  with initial therapeutic wedge resection of lung (Right, 1/2/2024); pacemaker, temporary, transvenous, pediatric (Right, 1/12/2024); and Implantation of leadless pacemaker (N/A, 1/16/2024).    Family History: family history includes Arthritis in her father; Breast cancer in her mother and paternal aunt; Cancer in her mother and paternal aunt; Diabetes in her paternal grandfather; Hyperlipidemia in her sister; Vision loss in her father, mother, and sister..    Social History:  reports that she has never smoked. She has never used smokeless tobacco. She reports that she does not drink alcohol and does not use drugs.    Review of patient's allergies indicates:   Allergen Reactions    Crestor [rosuvastatin] Swelling    Gluten protein        Medications:   Facility-Administered Medications Prior to Admission   Medication Dose Route Frequency Provider Last Rate Last Admin    capsaicin-skin cleanser patch 1 patch  1 patch Topical (Top) 1 time in Clinic/Asad Fagan MD        capsaicin-skin cleanser patch 2 patch  2 patch Topical (Top) 1 time in Clinic/HOD Asad Verduzco MD         Medications Prior to Admission   Medication Sig Dispense Refill Last Dose    atorvastatin (LIPITOR) 40 MG tablet Take 1 tablet (40 mg total) by mouth once daily. (Patient taking differently: Take 40 mg by mouth every evening.) 90 tablet 3 1/1/2024    epoetin noble-epbx (RETACRIT) 10,000 unit/mL imjection Inject 1 mL (10,000 Units total) into the skin every Mon, Wed, Fri. HOLD IF HEMOGLOBIN is 10 or GREATER    DO NOT SHAKE  DO NOT DILUTE  DO NOT MIX with other drug solutions 12 mL 0 1/1/2024 at 0900    ferrous gluconate (FERGON) 324 MG tablet Take 1 tablet (324 mg total) by mouth daily with breakfast. 30 tablet 3 1/1/2024 at 2200    meclizine (ANTIVERT) 25 mg tablet Take 1 tablet (25 mg total) by mouth 3 (three) times daily as needed for Dizziness. 90 tablet 3 1/1/2024 at 0800    melatonin (MELATIN) 3 mg tablet Take 3 tablets (9 mg  total) by mouth nightly as needed for Insomnia. 90 tablet 3 1/1/2024 at 2100    midodrine (PROAMATINE) 5 MG Tab Take 1 tablet (5 mg total) by mouth 2 (two) times daily as needed (if BP is systolic below 90 mmHg and patient is symptomatic. Also if BP is below 90 mmHg during dialysis). 60 tablet 3 1/2/2024    pantoprazole (PROTONIX) 40 MG tablet Take 1 tablet (40 mg total) by mouth once daily. 90 tablet 3 1/1/2024 at 0800    patiromer calcium sorbitex (VELTASSA) 8.4 gram PwPk Take 2 packets (16.8 g total) by mouth once daily. 180 packet 2 1/1/2024 at 2100    sevelamer HCL (RENAGEL) 800 MG Tab Take 2 tablets (1,600 mg total) by mouth 3 (three) times daily with meals. 180 tablet 11 1/1/2024 at 1800    torsemide 40 mg Tab Take 40 mg by mouth once daily. 30 tablet 3 1/1/2024 at 2100    B complex-vitamin C-folic acid (MELLISSA-BENJY) 0.8 mg Tab Take 1 tablet (0.8 mg total) by mouth once daily. 30 tablet 3 12/28/2023 at 0800    betahistine HCl (BETAHISTINE, BULK, MISC)    Unknown    blood sugar diagnostic (TRUE METRIX GLUCOSE TEST STRIP) Strp USE ONE STRIP FOR TESTING 2 TIMES A  each 11     blood-glucose meter kit Use to test twice a day 1 each 0     calcium-vitamin D3 (OS-CIPRIANO 500 + D3) 500 mg-5 mcg (200 unit) per tablet Take 1 tablet by mouth once daily. 30 tablet 3 12/28/2023 at 0800    coenzyme Q10 100 mg capsule    More than a month at 0800    dicyclomine (BENTYL) 10 MG capsule    Unknown    dronabinoL (MARINOL) 5 MG capsule Take 1 capsule (5 mg total) by mouth 2 (two) times daily before meals. 60 capsule 1 Unknown    econazole nitrate 1 % cream Apply topically 2 (two) times daily. 30 g 2 Unknown    insulin aspart U-100 (NOVOLOG) 100 unit/mL (3 mL) InPn pen Inject before meals:  150-200=+1, 201-250=+2, 251-300=+3; 301-350=+4, over 350=+5 units 15 mL 0 12/31/2023 at 1800    lancets Misc 1 lancet by Misc.(Non-Drug; Combo Route) route 4 (four) times daily. 400 each 3     mometasone (ELOCON) 0.1 % ointment Apply  "topically.   Unknown    neomycin (MYCIFRADIN) 500 mg Tab    Unknown    nutritional supplements Liqd Take 237 mLs by mouth 4 (four) times daily. 120 each 6 More than a month    oxyCODONE (ROXICODONE) 5 MG immediate release tablet Take 5 mg by mouth every 4 (four) hours as needed.   More than a month    pen needle, diabetic (BD ULTRA-FINE MARIS PEN NEEDLE) 32 gauge x 5/32" Ndle 1 Device by Misc.(Non-Drug; Combo Route) route 4 (four) times daily with meals and nightly. 400 each 3     pregabalin (LYRICA) 150 MG capsule TAKE ONE CAPSULE BY MOUTH 2 TIMES A DAY 60 capsule 2 Unknown    pregabalin (LYRICA) 75 MG capsule Take 1 capsule (75 mg total) by mouth Daily. Give after HD 30 capsule 0 Unknown    simethicone (MYLICON) 125 mg Cap capsule Take by mouth.   Unknown    vit C,Q-Zc-ytkgl-lutein-zeaxan (PRESERVISION AREDS 2) 365-340-82-1 mg-unit-mg-mg Cap    Unknown    vitamins  A,C,E-zinc-copper 14,320-226-200 unit-mg-unit Cap Take 1 tablet by mouth once daily.   Unknown       Physical Exam:    Vital Signs:   Vitals:    01/24/24 1300   BP: (!) 125/58   Pulse: 74   Resp: 12   Temp:        General Appearance: Well appearing in no acute distress  Abdomen: Soft, non tender, non distended with normal bowel sounds, no masses      Labs:  Lab Results   Component Value Date    WBC 17.21 (H) 01/24/2024    HGB 8.2 (L) 01/24/2024    HCT 24.2 (L) 01/24/2024     01/24/2024    CHOL 150 11/15/2023    TRIG 201 (H) 11/15/2023    HDL 45 11/15/2023    ALT 10 01/24/2024    AST 17 01/24/2024     (L) 01/24/2024    K 4.4 01/24/2024     01/24/2024    CREATININE 3.3 (H) 01/24/2024    BUN 75 (H) 01/24/2024    CO2 23 01/24/2024    TSH 1.821 01/13/2023    INR 1.3 (H) 01/23/2024    GLUF 86 07/11/2023    HGBA1C 4.9 11/15/2023       I have explained the risks and benefits of this endoscopic procedure to the patient including but not limited to bleeding, inflammation, infection, perforation, and death.    Assessment/Plan:     Acute blood " loss anemia     - Proceed with EGD     Yamilet Walters MD  Gastroenterology   [facility]

## 2024-01-25 LAB
ALBUMIN SERPL BCP-MCNC: 2 G/DL (ref 3.5–5.2)
ALP SERPL-CCNC: 83 U/L (ref 55–135)
ALT SERPL W/O P-5'-P-CCNC: 7 U/L (ref 10–44)
ANION GAP SERPL CALC-SCNC: 6 MMOL/L (ref 8–16)
AST SERPL-CCNC: 13 U/L (ref 10–40)
BASOPHILS # BLD AUTO: 0.02 K/UL (ref 0–0.2)
BASOPHILS # BLD AUTO: 0.02 K/UL (ref 0–0.2)
BASOPHILS # BLD AUTO: 0.03 K/UL (ref 0–0.2)
BASOPHILS # BLD AUTO: 0.07 K/UL (ref 0–0.2)
BASOPHILS NFR BLD: 0.1 % (ref 0–1.9)
BASOPHILS NFR BLD: 0.2 % (ref 0–1.9)
BASOPHILS NFR BLD: 0.2 % (ref 0–1.9)
BASOPHILS NFR BLD: 0.4 % (ref 0–1.9)
BILIRUB SERPL-MCNC: 0.6 MG/DL (ref 0.1–1)
BLD PROD TYP BPU: NORMAL
BLD PROD TYP BPU: NORMAL
BLOOD UNIT EXPIRATION DATE: NORMAL
BLOOD UNIT EXPIRATION DATE: NORMAL
BLOOD UNIT TYPE CODE: 6200
BLOOD UNIT TYPE CODE: 6200
BLOOD UNIT TYPE: NORMAL
BLOOD UNIT TYPE: NORMAL
BUN SERPL-MCNC: 53 MG/DL (ref 8–23)
CALCIUM SERPL-MCNC: 8.6 MG/DL (ref 8.7–10.5)
CHLORIDE SERPL-SCNC: 106 MMOL/L (ref 95–110)
CO2 SERPL-SCNC: 25 MMOL/L (ref 23–29)
CODING SYSTEM: NORMAL
CODING SYSTEM: NORMAL
CREAT SERPL-MCNC: 2.9 MG/DL (ref 0.5–1.4)
CROSSMATCH INTERPRETATION: NORMAL
CROSSMATCH INTERPRETATION: NORMAL
DIFFERENTIAL METHOD BLD: ABNORMAL
DISPENSE STATUS: NORMAL
DISPENSE STATUS: NORMAL
EOSINOPHIL # BLD AUTO: 0 K/UL (ref 0–0.5)
EOSINOPHIL NFR BLD: 0.2 % (ref 0–8)
EOSINOPHIL NFR BLD: 0.4 % (ref 0–8)
ERYTHROCYTE [DISTWIDTH] IN BLOOD BY AUTOMATED COUNT: 18.8 % (ref 11.5–14.5)
ERYTHROCYTE [DISTWIDTH] IN BLOOD BY AUTOMATED COUNT: 18.9 % (ref 11.5–14.5)
ERYTHROCYTE [DISTWIDTH] IN BLOOD BY AUTOMATED COUNT: 19.4 % (ref 11.5–14.5)
ERYTHROCYTE [DISTWIDTH] IN BLOOD BY AUTOMATED COUNT: 19.7 % (ref 11.5–14.5)
EST. GFR  (NO RACE VARIABLE): 16.6 ML/MIN/1.73 M^2
GLUCOSE SERPL-MCNC: 128 MG/DL (ref 70–110)
HCT VFR BLD AUTO: 20.7 % (ref 37–48.5)
HCT VFR BLD AUTO: 23.8 % (ref 37–48.5)
HCT VFR BLD AUTO: 24.8 % (ref 37–48.5)
HCT VFR BLD AUTO: 25.5 % (ref 37–48.5)
HGB BLD-MCNC: 6.3 G/DL (ref 12–16)
HGB BLD-MCNC: 7.6 G/DL (ref 12–16)
HGB BLD-MCNC: 8.3 G/DL (ref 12–16)
HGB BLD-MCNC: 8.4 G/DL (ref 12–16)
IMM GRANULOCYTES # BLD AUTO: 0.2 K/UL (ref 0–0.04)
IMM GRANULOCYTES # BLD AUTO: 0.27 K/UL (ref 0–0.04)
IMM GRANULOCYTES # BLD AUTO: 0.29 K/UL (ref 0–0.04)
IMM GRANULOCYTES # BLD AUTO: 0.51 K/UL (ref 0–0.04)
IMM GRANULOCYTES NFR BLD AUTO: 1.8 % (ref 0–0.5)
IMM GRANULOCYTES NFR BLD AUTO: 1.8 % (ref 0–0.5)
IMM GRANULOCYTES NFR BLD AUTO: 2 % (ref 0–0.5)
IMM GRANULOCYTES NFR BLD AUTO: 2.7 % (ref 0–0.5)
LYMPHOCYTES # BLD AUTO: 1 K/UL (ref 1–4.8)
LYMPHOCYTES # BLD AUTO: 1.1 K/UL (ref 1–4.8)
LYMPHOCYTES # BLD AUTO: 1.4 K/UL (ref 1–4.8)
LYMPHOCYTES # BLD AUTO: 1.4 K/UL (ref 1–4.8)
LYMPHOCYTES NFR BLD: 10.1 % (ref 18–48)
LYMPHOCYTES NFR BLD: 6.1 % (ref 18–48)
LYMPHOCYTES NFR BLD: 8.1 % (ref 18–48)
LYMPHOCYTES NFR BLD: 9.6 % (ref 18–48)
MAGNESIUM SERPL-MCNC: 1.9 MG/DL (ref 1.6–2.6)
MCH RBC QN AUTO: 31.3 PG (ref 27–31)
MCH RBC QN AUTO: 31.6 PG (ref 27–31)
MCH RBC QN AUTO: 32 PG (ref 27–31)
MCH RBC QN AUTO: 32.2 PG (ref 27–31)
MCHC RBC AUTO-ENTMCNC: 30.4 G/DL (ref 32–36)
MCHC RBC AUTO-ENTMCNC: 31.9 G/DL (ref 32–36)
MCHC RBC AUTO-ENTMCNC: 32.9 G/DL (ref 32–36)
MCHC RBC AUTO-ENTMCNC: 33.5 G/DL (ref 32–36)
MCV RBC AUTO: 105 FL (ref 82–98)
MCV RBC AUTO: 96 FL (ref 82–98)
MCV RBC AUTO: 96 FL (ref 82–98)
MCV RBC AUTO: 98 FL (ref 82–98)
MONOCYTES # BLD AUTO: 1.4 K/UL (ref 0.3–1)
MONOCYTES # BLD AUTO: 1.6 K/UL (ref 0.3–1)
MONOCYTES # BLD AUTO: 1.7 K/UL (ref 0.3–1)
MONOCYTES # BLD AUTO: 1.8 K/UL (ref 0.3–1)
MONOCYTES NFR BLD: 10 % (ref 4–15)
MONOCYTES NFR BLD: 15.6 % (ref 4–15)
MONOCYTES NFR BLD: 9.2 % (ref 4–15)
MONOCYTES NFR BLD: 9.6 % (ref 4–15)
NEUTROPHILS # BLD AUTO: 11.7 K/UL (ref 1.8–7.7)
NEUTROPHILS # BLD AUTO: 13.2 K/UL (ref 1.8–7.7)
NEUTROPHILS # BLD AUTO: 15.3 K/UL (ref 1.8–7.7)
NEUTROPHILS # BLD AUTO: 7.2 K/UL (ref 1.8–7.7)
NEUTROPHILS NFR BLD: 71.7 % (ref 38–73)
NEUTROPHILS NFR BLD: 79 % (ref 38–73)
NEUTROPHILS NFR BLD: 79.8 % (ref 38–73)
NEUTROPHILS NFR BLD: 81 % (ref 38–73)
NRBC BLD-RTO: 0 /100 WBC
NUM UNITS TRANS PACKED RBC: NORMAL
NUM UNITS TRANS PACKED RBC: NORMAL
PHOSPHATE SERPL-MCNC: 3 MG/DL (ref 2.7–4.5)
PLATELET # BLD AUTO: 161 K/UL (ref 150–450)
PLATELET # BLD AUTO: 172 K/UL (ref 150–450)
PLATELET # BLD AUTO: 177 K/UL (ref 150–450)
PLATELET # BLD AUTO: 78 K/UL (ref 150–450)
PMV BLD AUTO: 11.2 FL (ref 9.2–12.9)
PMV BLD AUTO: 11.2 FL (ref 9.2–12.9)
PMV BLD AUTO: 11.5 FL (ref 9.2–12.9)
PMV BLD AUTO: 12.5 FL (ref 9.2–12.9)
POCT GLUCOSE: 129 MG/DL (ref 70–110)
POCT GLUCOSE: 154 MG/DL (ref 70–110)
POCT GLUCOSE: 191 MG/DL (ref 70–110)
POTASSIUM SERPL-SCNC: 4.7 MMOL/L (ref 3.5–5.1)
PROT SERPL-MCNC: 4.8 G/DL (ref 6–8.4)
RBC # BLD AUTO: 1.97 M/UL (ref 4–5.4)
RBC # BLD AUTO: 2.43 M/UL (ref 4–5.4)
RBC # BLD AUTO: 2.58 M/UL (ref 4–5.4)
RBC # BLD AUTO: 2.66 M/UL (ref 4–5.4)
SODIUM SERPL-SCNC: 137 MMOL/L (ref 136–145)
WBC # BLD AUTO: 10.09 K/UL (ref 3.9–12.7)
WBC # BLD AUTO: 14.79 K/UL (ref 3.9–12.7)
WBC # BLD AUTO: 16.57 K/UL (ref 3.9–12.7)
WBC # BLD AUTO: 18.84 K/UL (ref 3.9–12.7)

## 2024-01-25 PROCEDURE — 25000003 PHARM REV CODE 250

## 2024-01-25 PROCEDURE — 63600175 PHARM REV CODE 636 W HCPCS

## 2024-01-25 PROCEDURE — C9113 INJ PANTOPRAZOLE SODIUM, VIA: HCPCS

## 2024-01-25 PROCEDURE — A4216 STERILE WATER/SALINE, 10 ML: HCPCS | Performed by: STUDENT IN AN ORGANIZED HEALTH CARE EDUCATION/TRAINING PROGRAM

## 2024-01-25 PROCEDURE — 25000003 PHARM REV CODE 250: Performed by: STUDENT IN AN ORGANIZED HEALTH CARE EDUCATION/TRAINING PROGRAM

## 2024-01-25 PROCEDURE — 97530 THERAPEUTIC ACTIVITIES: CPT

## 2024-01-25 PROCEDURE — 84100 ASSAY OF PHOSPHORUS: CPT

## 2024-01-25 PROCEDURE — 20600001 HC STEP DOWN PRIVATE ROOM

## 2024-01-25 PROCEDURE — 99291 CRITICAL CARE FIRST HOUR: CPT | Mod: ,,, | Performed by: ANESTHESIOLOGY

## 2024-01-25 PROCEDURE — 99232 SBSQ HOSP IP/OBS MODERATE 35: CPT | Mod: ,,, | Performed by: NURSE PRACTITIONER

## 2024-01-25 PROCEDURE — 36415 COLL VENOUS BLD VENIPUNCTURE: CPT

## 2024-01-25 PROCEDURE — 97116 GAIT TRAINING THERAPY: CPT

## 2024-01-25 PROCEDURE — 63600175 PHARM REV CODE 636 W HCPCS: Performed by: NURSE PRACTITIONER

## 2024-01-25 PROCEDURE — 85025 COMPLETE CBC W/AUTO DIFF WBC: CPT | Mod: 91

## 2024-01-25 PROCEDURE — 25000003 PHARM REV CODE 250: Performed by: SURGERY

## 2024-01-25 PROCEDURE — 99232 SBSQ HOSP IP/OBS MODERATE 35: CPT | Mod: ,,,

## 2024-01-25 PROCEDURE — 97535 SELF CARE MNGMENT TRAINING: CPT

## 2024-01-25 PROCEDURE — P9016 RBC LEUKOCYTES REDUCED: HCPCS

## 2024-01-25 PROCEDURE — 83735 ASSAY OF MAGNESIUM: CPT

## 2024-01-25 PROCEDURE — 85025 COMPLETE CBC W/AUTO DIFF WBC: CPT

## 2024-01-25 PROCEDURE — 86920 COMPATIBILITY TEST SPIN: CPT

## 2024-01-25 PROCEDURE — 80053 COMPREHEN METABOLIC PANEL: CPT | Performed by: STUDENT IN AN ORGANIZED HEALTH CARE EDUCATION/TRAINING PROGRAM

## 2024-01-25 RX ORDER — PANTOPRAZOLE SODIUM 40 MG/1
40 TABLET, DELAYED RELEASE ORAL
Status: DISCONTINUED | OUTPATIENT
Start: 2024-01-25 | End: 2024-02-06 | Stop reason: HOSPADM

## 2024-01-25 RX ORDER — FLUCONAZOLE 100 MG/1
100 TABLET ORAL DAILY
Status: DISCONTINUED | OUTPATIENT
Start: 2024-01-26 | End: 2024-01-25

## 2024-01-25 RX ORDER — HYDROCODONE BITARTRATE AND ACETAMINOPHEN 500; 5 MG/1; MG/1
TABLET ORAL
Status: DISCONTINUED | OUTPATIENT
Start: 2024-01-25 | End: 2024-01-26

## 2024-01-25 RX ORDER — INSULIN ASPART 100 [IU]/ML
2 INJECTION, SOLUTION INTRAVENOUS; SUBCUTANEOUS
Status: DISCONTINUED | OUTPATIENT
Start: 2024-01-25 | End: 2024-01-30

## 2024-01-25 RX ORDER — FLUCONAZOLE 200 MG/1
200 TABLET ORAL ONCE
Status: DISCONTINUED | OUTPATIENT
Start: 2024-01-25 | End: 2024-01-25

## 2024-01-25 RX ORDER — FLUCONAZOLE 200 MG/1
200 TABLET ORAL DAILY
Status: DISCONTINUED | OUTPATIENT
Start: 2024-01-26 | End: 2024-01-25

## 2024-01-25 RX ORDER — FLUCONAZOLE 200 MG/1
200 TABLET ORAL NIGHTLY
Status: DISCONTINUED | OUTPATIENT
Start: 2024-01-26 | End: 2024-02-06 | Stop reason: HOSPADM

## 2024-01-25 RX ORDER — FLUCONAZOLE 200 MG/1
400 TABLET ORAL ONCE
Status: COMPLETED | OUTPATIENT
Start: 2024-01-25 | End: 2024-01-25

## 2024-01-25 RX ADMIN — INSULIN ASPART 3 UNITS: 100 INJECTION, SOLUTION INTRAVENOUS; SUBCUTANEOUS at 08:01

## 2024-01-25 RX ADMIN — METRONIDAZOLE 500 MG: 500 TABLET ORAL at 08:01

## 2024-01-25 RX ADMIN — METOPROLOL TARTRATE 12.5 MG: 25 TABLET, FILM COATED ORAL at 10:01

## 2024-01-25 RX ADMIN — Medication 10 ML: at 12:01

## 2024-01-25 RX ADMIN — METRONIDAZOLE 500 MG: 500 TABLET ORAL at 09:01

## 2024-01-25 RX ADMIN — INSULIN ASPART 2 UNITS: 100 INJECTION, SOLUTION INTRAVENOUS; SUBCUTANEOUS at 11:01

## 2024-01-25 RX ADMIN — Medication 10 ML: at 06:01

## 2024-01-25 RX ADMIN — MIDODRINE HYDROCHLORIDE 10 MG: 5 TABLET ORAL at 08:01

## 2024-01-25 RX ADMIN — ERYTHROMYCIN ETHYLSUCCINATE 200 MG: 200 SUSPENSION ORAL at 08:01

## 2024-01-25 RX ADMIN — PANTOPRAZOLE SODIUM 40 MG: 40 TABLET, DELAYED RELEASE ORAL at 04:01

## 2024-01-25 RX ADMIN — Medication 10 ML: at 08:01

## 2024-01-25 RX ADMIN — ERYTHROMYCIN ETHYLSUCCINATE 200 MG: 200 SUSPENSION ORAL at 01:01

## 2024-01-25 RX ADMIN — ERYTHROMYCIN ETHYLSUCCINATE 200 MG: 200 SUSPENSION ORAL at 05:01

## 2024-01-25 RX ADMIN — DRONABINOL 5 MG: 2.5 CAPSULE ORAL at 09:01

## 2024-01-25 RX ADMIN — MIDODRINE HYDROCHLORIDE 10 MG: 5 TABLET ORAL at 04:01

## 2024-01-25 RX ADMIN — OXYBUTYNIN CHLORIDE 5 MG: 5 TABLET ORAL at 08:01

## 2024-01-25 RX ADMIN — CEFPODOXIME PROXETIL 200 MG: 200 TABLET, FILM COATED ORAL at 08:01

## 2024-01-25 RX ADMIN — LIDOCAINE 5% 1 PATCH: 700 PATCH TOPICAL at 09:01

## 2024-01-25 RX ADMIN — INSULIN ASPART 2 UNITS: 100 INJECTION, SOLUTION INTRAVENOUS; SUBCUTANEOUS at 05:01

## 2024-01-25 RX ADMIN — FLUCONAZOLE 400 MG: 200 TABLET ORAL at 09:01

## 2024-01-25 RX ADMIN — METOPROLOL TARTRATE 12.5 MG: 25 TABLET, FILM COATED ORAL at 09:01

## 2024-01-25 RX ADMIN — OXYBUTYNIN CHLORIDE 5 MG: 5 TABLET ORAL at 09:01

## 2024-01-25 RX ADMIN — INSULIN ASPART 1 UNITS: 100 INJECTION, SOLUTION INTRAVENOUS; SUBCUTANEOUS at 05:01

## 2024-01-25 RX ADMIN — ACETAMINOPHEN 650 MG: 325 TABLET ORAL at 10:01

## 2024-01-25 RX ADMIN — OXYBUTYNIN CHLORIDE 5 MG: 5 TABLET ORAL at 04:01

## 2024-01-25 RX ADMIN — ONDANSETRON 8 MG: 2 INJECTION INTRAMUSCULAR; INTRAVENOUS at 09:01

## 2024-01-25 RX ADMIN — PANTOPRAZOLE SODIUM 40 MG: 40 INJECTION, POWDER, FOR SOLUTION INTRAVENOUS at 09:01

## 2024-01-25 RX ADMIN — SENNOSIDES AND DOCUSATE SODIUM 1 TABLET: 8.6; 5 TABLET ORAL at 09:01

## 2024-01-25 RX ADMIN — VENLAFAXINE 37.5 MG: 37.5 TABLET ORAL at 09:01

## 2024-01-25 RX ADMIN — DRONABINOL 5 MG: 2.5 CAPSULE ORAL at 08:01

## 2024-01-25 NOTE — PROGRESS NOTES
01/24/24 2120   Vital Signs   Temp 97.4 °F (36.3 °C)   Temp Source Oral   Pulse 74   Resp 12   SpO2 100 %   /63   MAP (mmHg) 90   Post-Hemodialysis Assessment   Rinseback Volume (mL) 250 mL   Blood Volume Processed (Liters) 51.6 L   Dialyzer Clearance Lightly streaked   Duration of Treatment   (180 Minutes.)   Total UF (mL) 1600 mL   Net Fluid Removal 1000   Patient Response to Treatment Tolerated full treatment well.   Arterial bleeding stop time (min) 6 min   Venous bleeding stop time (min) 6 min   Post-Hemodialysis Comments LUE AVF site stable with no bleeding noted. & dressing clean, dry, and intact.

## 2024-01-25 NOTE — PLAN OF CARE
Problem: Physical Therapy  Goal: Physical Therapy Goal  Description: PT goals until 2/9/24    1. Pt supine to sit with supervision-not met  2. Pt sit to supine with supervision-not met  3. Pt sit to stand with AD if needed with supervision-not met  4. Pt to perform gait 150 ft with AD if needed with supervision.-not met  5. Pt to go up/down curb step with AD if needed with CGA.-not met    Outcome: Ongoing, Progressing   Goals remain appropriate. 1/25/2024

## 2024-01-25 NOTE — ASSESSMENT & PLAN NOTE
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 Stepped up to the unit for persistent afib rvr. Had SBO (now resolved). 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  Pacemaker placed on 01/16. R Chest tube placed w IR on 1/17. L sided chest tube placed w IR on 1/18. CXR stable. Pleural cultures NGTD.      Neuro/Psych:   -- Sedation: none  -- Pain: Lidocaine patch   -- has anxiety, not on medications at home   - started on venlafaxine 37.5 mg QD     Cards:   -- BP goals SBP >90, MAPs >55   - Midodrine 5mg BID   - Midodrine 15mg additional with HD  -- Afib RVR, last occurred on 1/19/24 after HD   - Now back in NSR   - continue metoprolol tartrate 12.5 mg BID, hold if soft BP   - continue heparin gtt for anticoagulation   - prn iv metoprolol for sustained RVR > 130's   - If IV Metop fails or if hypotensive start Amio  -- Asystolic arrest, now s/p PPM 1/16 rate set to 60 bmp   - 1/12/24 patient asystole with 1 round of compressions and achievement of ROSC. Multiple bradycardic events prior to which had self resolved. STAT TVP placement unsuccessful 2/2 central venous stenosis. 2nd episode of asystole resolved after minimal compressions. 3rd episode of asystole resolved with singular compression.   - S/p TVP via groin in Cath lab 1/12, removed 1/16   - PPM 1/16 @ 60, heparin gtt held 5x days post op. Now continue to hold 2/2 GI bleed   - high digoxin level now s/p digibind 1/12 and converted to NSR after administration  - dig level now WNL x 2     Pulm:   -- Goal O2 sat > 90%   - satting 100% on RA  -- Surgical chest tube d/c'd 1/8/24  -- CXR with pleural effusions and RLL opacification  -- CT chest (1/13): R worse than L pleural effusion, pericardial effusion   -- IR placed bilateral chest tubes for pleural effusions   -- R CT placed 1/17   -- L CT placed 1/18    -- Plan to remove L CT  on 1/25  -- continue incentive spirometry      Renal:  -- history of ESRD, dialysis dependent   - HD per nephrology, additional midodrine 15mg On Call PRN to be administered prior to HD   - AVF okay to use per vascular  -- trend BUN/Cr  -- holding sevelamer in setting of poor PO intake and low daily phos  -- patient is anuric vs oliguric  -- permacath in place last exchanged 10/2023  -- oxybutynin to reduce bladder spasm possibly triggering eulogio episodes    Recent Labs   Lab 01/23/24  0500 01/24/24  0219 01/25/24  0320   BUN 34* 75* 53*   CREATININE 2.7* 3.3* 2.9*           FEN / GI:   -- CT on 1/8/24 demonstrating SBO, now resolved  -- US on 1/8/24 showing distended gallbladder without signs of acute cholecystitis   - NGT removed  - Equivocal GGC 1/10  -- 1/23/24 had multiple large volume bloody bowel movements.   - Heparin stopped and protamine administered, GI consulted.    - EGD 1/24/24    - Aborted for food in mid esophagus   - IV PPI BID   - H/H stable. Monitor for signs of GI bleed.   -- Replace lytes as needed  -- Nutrition:  - Dietician consulted for complex dietary restriction  - home appetite stimulant dronabinol 5 mg BID  -- Bowel regiment: miralax and doc-senna      ID:   -- Tm: afebrile; WBC down trending  -- C/f HAP with RLL consolidation, ID following  -- MRSA neg, dc vanc  -- zosyn broadened to meropenem given pleural effusions, completed Merrem on 1/20/24  -- Start ceftriaxone 2 gm IV daily plus PO metronidazole 500 mg BID on 1/21/24  -- Transitioned ceftriaxone to cefpodoxime (renally dosed)   -- Continue flagyl 500 mg BID and cefpodoxime 200 mg QHS for 4 weeks (end date: 2/15/24)  -- blood cultures with 1 bottle growing staph epi, likely a contaminant  -- pleural cultures (1/18): NGTD  -- Candida esophagitis seen on EGD    -- fluconazole 400mg for 1 dose then fluconazole 200mg daily for 14 days    Recent Labs   Lab 01/24/24  1612 01/24/24  2357 01/25/24  0320   WBC 16.53* 18.84* 16.57*            Heme/Onc:  -- Daily CBC  -- heparin gtt for A-fib, held 5 days after PPM 1/16, restarted 1/22, stopped 1/23/24 after bloody stools  -- S/p 1 unit pRBC's on 1/20/24 for Hb 6.8  -- s/p 2u pRBC on 1/23/24 for Hb of 5.5 after bloody stools    Recent Labs   Lab 01/22/24  0811 01/22/24  1536 01/23/24  1104 01/23/24  1221 01/23/24  1544 01/23/24  2347 01/24/24  1612 01/24/24  2357 01/25/24  0320   HGB  --    < >  --   --  5.5*   < > 8.0* 8.4* 8.3*   PLT  --    < >  --   --  225   < > 199 177 172   APTT 22.9   < > >150.0* 69.0* 80.9*  --   --   --   --    INR 1.1  --   --   --  1.3*  --   --   --   --     < > = values in this interval not displayed.           Endo:   -- history of diabetes  -- Gluc goal 140-180  -- detemir 3u w/ meal  -- SSI      PPx:   Feeding:adult regular diet  Analgesia/Sedation: Pain is controlled, no sedation  Thromboembolic prevention: heparin off 2/2 GI bleed  HOB >30: yes  Stress Ulcer ppx: pantoprazole IV BID  Glucose control: Critical care goal 140-180 g/dl, detemir and SSI  Bowel reg: miralax and doc-senna  Invasive Lines/Drains/Airway: Permacath, Bilateral chest tubes, PIV, midline  Deescalation: d/c left chest tube, ok for step down        Dispo/Code Status/Palliative:   -- Full Code  -- Step down

## 2024-01-25 NOTE — PROGRESS NOTES
Nurses Note -- 4 Eyes      1/25/2024   5:54 PM      Skin assessed during: Transfer      [] No Altered Skin Integrity Present    []Prevention Measures Documented      [x] Yes- Altered Skin Integrity Present or Discovered   [] LDA Added if Not in Epic (Describe Wound)   [] New Altered Skin Integrity was Present on Admit and Documented in LDA   [] Wound Image Taken    Wound Care Consulted? Yes    Attending Nurse:  Alyssa Verma RN/Staff Member:  Kanchan Lema

## 2024-01-25 NOTE — PROGRESS NOTES
[facility]  Gastroenterology  Progress Note    Patient Name: Lily Green  MRN: [id number]  Admission Date: 1/2/2024  Hospital Length of Stay: 23 days  Code Status: Full Code   Attending Provider: Tan Thomson MD  Consulting Provider: Minerva Russ NP  Primary Care Physician: Pavel Calixto MD  Principal Problem: Malignant carcinoid tumor of bronchus    Subjective:     Interval History: Followed up with patient s/p EGD yesterday. Likely candida esophagitis noted as well as food in stomach. BMs improving in color per RN. Blood counts have remained stable without further evidence of bleeding.     Review of Systems   Constitutional:  Positive for appetite change. Negative for activity change.   HENT:  Positive for trouble swallowing. Negative for sore throat.    Gastrointestinal:  Positive for blood in stool. Negative for abdominal distention, abdominal pain, anal bleeding, constipation, diarrhea, nausea, rectal pain and vomiting.   Skin:  Negative for color change and pallor.   Neurological:  Positive for weakness. Negative for dizziness.     Objective:     Vital Signs (Most Recent):  Temp: 97.6 °F (36.4 °C) (01/25/24 0700)  Pulse: 75 (01/25/24 1000)  Resp: 11 (01/25/24 1000)  BP: (!) 106/52 (01/25/24 0930)  SpO2: 97 % (01/25/24 1000) Vital Signs (24h Range):  Temp:  [97.4 °F (36.3 °C)-98.1 °F (36.7 °C)] 97.6 °F (36.4 °C)  Pulse:  [64-90] 75  Resp:  [9-21] 11  SpO2:  [87 %-100 %] 97 %  BP: ()/(42-72) 106/52     Weight: 67.6 kg (149 lb) (01/25/24 0600)  Body mass index is 24.79 kg/m².      Intake/Output Summary (Last 24 hours) at 1/25/2024 1027  Last data filed at 1/25/2024 0931  Gross per 24 hour   Intake 547 ml   Output 1860 ml   Net -1313 ml       Lines/Drains/Airways       Central Venous Catheter Line  Duration                  Hemodialysis Catheter 01/25/23 1501 right internal jugular 364 days              Drain  Duration                  Chest Tube 01/17/24 1505  Right Pleural 10 Fr. 7 days         Chest Tube 01/18/24 1537 Left Pleural 10 Fr. 6 days              Peripheral Intravenous Line  Duration                  Hemodialysis AV Fistula 09/01/23 0800 Left forearm 146 days         Midline Catheter Insertion/Assessment  - Single Lumen 01/22/24 1707 Right basilic vein (medial side of arm) 18g x 10cm 2 days                     Physical Exam  Vitals reviewed.   Constitutional:       Appearance: She is normal weight. She is not ill-appearing.   HENT:      Mouth/Throat:      Mouth: Mucous membranes are moist.      Pharynx: Oropharynx is clear.   Eyes:      General: No scleral icterus.  Abdominal:      General: Bowel sounds are normal. There is no distension.      Palpations: Abdomen is soft. There is no mass.   Skin:     General: Skin is warm and dry.      Capillary Refill: Capillary refill takes less than 2 seconds.      Coloration: Skin is not jaundiced or pale.   Neurological:      Mental Status: She is alert and oriented to person, place, and time. Mental status is at baseline.          Significant Labs:  CBC:   Recent Labs   Lab 01/24/24  2357 01/25/24  0320 01/25/24  0927   WBC 18.84* 16.57* 14.79*   HGB 8.4* 8.3* 7.6*   HCT 25.5* 24.8* 23.8*    172 161     CMP:   Recent Labs   Lab 01/25/24  0320   *   CALCIUM 8.6*   ALBUMIN 2.0*   PROT 4.8*      K 4.7   CO2 25      BUN 53*   CREATININE 2.9*   ALKPHOS 83   ALT 7*   AST 13   BILITOT 0.6         Significant Imaging:  Imaging results within the past 24 hours have been reviewed.  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 Hgb 5.5, PTT 80.9, INR 1.3 as of 1/23, now s/p 3 U pRBCs, FFP and protamine without further episodes of bleeding. She remains HDS not requiring pressors. EGD yesterday which was notable for food present in stomach. Also with severe candida.     Recommendations:  - Mildly severe candidiasis esophagitis with no bleeding. Recommend antifungal agent for the treatment of candida esophagitis. Can consider Diflucan 200mg on day 1 and then Diflucan 100mg daily for 14 days.   - Can consider restarting anticoagulation, but would continue PPI   - If ongoing evidence of active GI bleeding, please call the GI team to consider repeat EGD.           Thank you for your consult. After careful review of labs and patient current status with the GI staff and fellow, it has been decided that I will sign off. Please contact us if you have any additional questions.    Minerva Russ NP  Gastroenterology  [facility]

## 2024-01-25 NOTE — NURSING TRANSFER
Nursing Transfer Note      1/25/2024   2:10 PM    Nurse giving handoff:ANDRES Abdi RN   Nurse receiving handoff:ARABELLA Shah    Reason patient is being transferred: Stepdown     Transfer To: 1051    Transfer via bed    Transfer with  to O2, cardiac monitoring    Transported by CHANTAL Aguilar RN & ANDRES Abdi RN       Order for Tele Monitor? Yes    Additional Lines: Oxygen & Chest tube       Medicines sent: Erythromycin       Patient belongings transferred with patient: Yes    Chart send with patient: Yes    Notified: spouse @ bedside    Patient reassessed at: 1/25/2024  1408    Upon arrival to floor: cardiac monitor applied, patient oriented to room, call bell in reach, and bed in lowest position

## 2024-01-25 NOTE — PROGRESS NOTES
Bedside nurse Michelle BELL informed of dialysis initiation.    1:1 Bedside HD treatment started as ordered.  VS's per Dialysis Flowsheet.    Dialysis Access: LUE AVF  Site edematous.    Needle Size: 16 gauge X2  Insertion with no complications.    Will maintain telemetry and blood pressure monitoring throughout treatment.  Refer to dialysis flowsheet and MAR for details.

## 2024-01-25 NOTE — PROGRESS NOTES
01/24/24 2058   Vital Signs   Temp 97.4 °F (36.3 °C)   Temp Source Oral   Pulse 84   Resp 15   SpO2 97 %   /69   MAP (mmHg) 95   During Hemodialysis Assessment   Arteriovenous Lines Secure Yes   Blood Volume Processed (Liters) 51.6 L   UF Removed (mL) 1600 mL   Intra-Hemodialysis Comments Tolerated full treatment well and all blood reinfused.

## 2024-01-25 NOTE — PLAN OF CARE
12:58 PM  The SW met with the patient and her  at bedside to follow-up regarding her d/c plans. The patient and her  reported that  their plans would be to return home.     The SW will continue to follow.     Alberto Fitch LMSW  Case Management [facility]

## 2024-01-25 NOTE — PROGRESS NOTES
[facility]  Adult Nutrition  Progress Note    SUMMARY       Recommendations    1. Recommend to change diet back to Renal, gluten free diet with texture per SLP                   - texture advance per SLP/MD     - this is the diet pt follows at home.     2. Rec'd continuing ONS Novasource renal BID       3. Recommend considering psyllium husk BID to help bulk up stools.     4.) Recommend adding daily probiotic to help with GI issues.     5.) RD to monitor and follow    Goals: Meet % EEN, EPN by RD f/u  Nutrition Goal Status: progressing towards goal  Communication of RD Recs:  (POC)    Assessment and Plan    Nutrition Problem  Inadequate oral intake     Related to (etiology):   Decreased ability to consume sufficient energy     Signs and Symptoms (as evidenced by):   Restrictive diet       Interventions/Recommendations (treatment strategy):  Collaboration of nutrition care with other providers     Nutrition Diagnosis Status:   Continue    Reason for Assessment    Reason For Assessment: RD follow-up  Diagnosis:  (malignant carcinoid tumor of bronchus)  Relevant Medical History: T2DM, ESRD  Interdisciplinary Rounds: did not attend    General Information Comments: RD f/u: pt was not seen, d/t SW in the room. RD spoke with pt's RN, RN stated that pt's caregivers are bringing food/smoothies to pt. RN stated that pt does drink ONS as ordered. Noted that diet texture advanced on 1/24- renal and gluten-free modifiers were discontinued. Diarrhea noted on 1/25. Mild edema present. Pt still receiving HD. Failed EGD attempt yesterday, d/t food present in stomach, despite sufficient NPO time. Pt is at risk for malnutrition. RD team to continue to monitor.    Nutrition Discharge Planning: Renal diet    Nutrition Risk Screen    Nutrition Risk Screen: no indicators present    Nutrition/Diet History    Patient Reported Diet/Restrictions/Preferences: gluten-free, diabetic diet, renal (Low potassium)  Food Preferences: No  "potatoes, grits, high potassium foods. Drink smoothies, eats strawberries, low sodium chicken broth, cabbage, carrots OK; Broiled fish/chicken  Spiritual, Cultural Beliefs, Druze Practices, Values that Affect Care: no  Food Allergies: wheat (gluten)  Factors Affecting Nutritional Intake: decreased appetite    Anthropometrics    Temp: 97.7 °F (36.5 °C)  Height Method: Stated  Height: 5' 5" (165.1 cm)  Height (inches): 65 in  Weight Method: Bed Scale  Weight: 67.6 kg (149 lb)  Weight (lb): 149 lb  Ideal Body Weight (IBW), Female: 125 lb  % Ideal Body Weight, Female (lb): 121.6 %  BMI (Calculated): 24.8    Lab/Procedures/Meds    Pertinent Labs Reviewed: reviewed  Pertinent Labs Comments: BUN: 53, cr: 2.9, GFR: 16.6, gluc: 128, Ca: 8.6, PRO: 4.8, alb: 2.0    Pertinent Medications Reviewed: reviewed  Pertinent Medications Comments: dronabinol, insulin, abx, pantoprazole, polyethylene glycol, senna-docusate    Estimated/Assessed Needs    Weight Used For Calorie Calculations: 57 kg (125 lb 10.6 oz) (d/t edema present)  Energy Calorie Requirements (kcal): 1710- 1995 kcal  Energy Need Method: Kcal/kg (30-35 kcal/kg of IBW)    Protein Requirements: 86- 114g (1.5- 2.0g/kg of IBW)  Weight Used For Protein Calculations: 57 kg (125 lb 10.6 oz) (IBW d/t edema present)  RDA Method (mL): 1710    CHO Requirement: 214- 249 g    Nutrition Prescription Ordered    Current Diet Order: Regular Diet (changed on 1/24)  Nutrition Order Comments: -  Oral Nutrition Supplement: NSR BID    Evaluation of Received Nutrient/Fluid Intake    I/O: -2.8L since 1/18  Energy Calories Required: not meeting needs  Protein Required: not meeting needs  Fluid Required:  (as per MD)  Comments: LBM 1/25 (loose)  Tolerance: tolerating  % Intake of Estimated Energy Needs: 25 - 50 %  % Meal Intake: 25 - 50 %    Nutrition Risk    Level of Risk/Frequency of Follow-up:  (1x/week)     Monitor and Evaluation    Food and Nutrient Intake: energy intake, food and " beverage intake  Food and Nutrient Adminstration: diet order  Knowledge/Beliefs/Attitudes: food and nutrition knowledge/skill, beliefs and attitudes  Physical Activity and Function: nutrition-related ADLs and IADLs  Anthropometric Measurements: height/length, weight, body mass index, weight change  Biochemical Data, Medical Tests and Procedures: electrolyte and renal panel, gastrointestinal profile, glucose/endocrine profile, inflammatory profile, lipid profile  Nutrition-Focused Physical Findings: overall appearance     Nutrition Follow-Up    RD Follow-up?: Yes

## 2024-01-25 NOTE — SUBJECTIVE & OBJECTIVE
"Interval HPI:   Overnight events: No acute events overnight. Patient in room 89467 CVICU/89301 CVICU A. Blood glucose improving. BG at goal on current insulin regimen (SSI, prandial, and basal insulin ). Steroid use- None. 1 Day Post-Op  Renal function- Abnormal - Creatinine 2.9   Vasopressors-  None       Endocrine will continue to follow and manage insulin orders inpatient.         Diet Adult Regular (IDDSI Level 7)     Eatin%  Nausea: No  Hypoglycemia and intervention: No  Fever: No  TPN and/or TF: No      BP (!) 106/52   Pulse 75   Temp 97.6 °F (36.4 °C) (Oral)   Resp 11   Ht 5' 5" (1.651 m)   Wt 67.6 kg (149 lb)   LMP  (LMP Unknown) Comment: BEAU/ NO BSO  1986  SpO2 97%   Breastfeeding No   BMI 24.79 kg/m²     Labs Reviewed and Include    Recent Labs   Lab 24  0320   *   CALCIUM 8.6*   ALBUMIN 2.0*   PROT 4.8*      K 4.7   CO2 25      BUN 53*   CREATININE 2.9*   ALKPHOS 83   ALT 7*   AST 13   BILITOT 0.6     Lab Results   Component Value Date    WBC 14.79 (H) 2024    HGB 7.6 (L) 2024    HCT 23.8 (L) 2024    MCV 98 2024     2024     No results for input(s): "TSH", "FREET4" in the last 168 hours.  Lab Results   Component Value Date    HGBA1C 4.9 11/15/2023       Nutritional status:   Body mass index is 24.79 kg/m².  Lab Results   Component Value Date    ALBUMIN 2.0 (L) 2024    ALBUMIN 1.8 (L) 2024    ALBUMIN 1.7 (L) 2024     No results found for: "PREALBUMIN"    Estimated Creatinine Clearance: 15.5 mL/min (A) (based on SCr of 2.9 mg/dL (H)).    Accu-Checks  Recent Labs     24  0759 24  1106 24  1544 24  1547 24  2019 24  0742 24  1134 24  1616 24  2142 24  0813   POCTGLUCOSE 224* 250* 322* 316* 221* 191* 170* 133* 122* 129*       Current Medications and/or Treatments Impacting Glycemic Control  Immunotherapy:    Immunosuppressants       None          Steroids: "   Hormones (From admission, onward)      None          Pressors:    Autonomic Drugs (From admission, onward)      Start     Stop Route Frequency Ordered    01/23/24 1715  midodrine tablet 10 mg         -- Oral 2 times daily with meals 01/23/24 1315    01/22/24 1045  metoprolol tartrate (LOPRESSOR) split tablet 12.5 mg         -- Oral 2 times daily 01/22/24 0932    01/22/24 0930  midodrine tablet 15 mg         -- Oral As needed (PRN) 01/22/24 0930          Hyperglycemia/Diabetes Medications:   Antihyperglycemics (From admission, onward)      Start     Stop Route Frequency Ordered    01/26/24 0900  insulin detemir U-100 (Levemir) pen 6 Units         -- SubQ Daily 01/25/24 1013    01/25/24 1130  insulin aspart U-100 pen 2 Units         -- SubQ 3 times daily with meals 01/25/24 1013    01/19/24 1232  insulin aspart U-100 pen 0-10 Units         -- SubQ Before meals & nightly PRN 01/19/24 1232

## 2024-01-25 NOTE — SUBJECTIVE & OBJECTIVE
Interval History/Significant Events: NAEON. EGD attempted yesterday buy aborted 2/2 to food in the mid esophagus. No continued signs of bleeding. Dark bowel movement assumed to be old blood. If signs of bleeding return, will call GI. Tolerated HD last night. Removed 1L of fluid. Has remained in NSR.     Follow-up For: Procedure(s) (LRB):  EGD (ESOPHAGOGASTRODUODENOSCOPY) (N/A)    Post-Operative Day: 1 Day Post-Op    Objective:     Vital Signs (Most Recent):  Temp: 97.7 °F (36.5 °C) (01/25/24 0300)  Pulse: 67 (01/25/24 0600)  Resp: 18 (01/25/24 0600)  BP: 124/60 (01/25/24 0600)  SpO2: 100 % (01/25/24 0600) Vital Signs (24h Range):  Temp:  [97.4 °F (36.3 °C)-98.1 °F (36.7 °C)] 97.7 °F (36.5 °C)  Pulse:  [64-90] 67  Resp:  [10-21] 18  SpO2:  [87 %-100 %] 100 %  BP: ()/(42-78) 124/60     Weight: 67.6 kg (149 lb)  Body mass index is 24.79 kg/m².      Intake/Output Summary (Last 24 hours) at 1/25/2024 0703  Last data filed at 1/25/2024 0300  Gross per 24 hour   Intake 400 ml   Output 1880 ml   Net -1480 ml          Physical Exam  Vitals and nursing note reviewed.   Constitutional:       Appearance: Normal appearance.   HENT:      Head: Normocephalic and atraumatic.      Nose: Nose normal.   Cardiovascular:      Rate and Rhythm: Normal rate and regular rhythm.      Pulses: Normal pulses.   Pulmonary:      Effort: Pulmonary effort is normal. No respiratory distress.      Comments: Right posterior chest incision c/d/I    Right chest permacath    Right Chest tube to waterseal   Left chest tube to waterseal   Abdominal:      General: Abdomen is flat. There is no distension.      Palpations: Abdomen is soft.      Tenderness: There is no abdominal tenderness.      Comments: Soft, nontender abdomen   Musculoskeletal:      Right lower leg: Edema present.      Left lower leg: Edema present.   Skin:     General: Skin is warm and dry.      Comments: Groin soft, dressings c/d/i   Neurological:      General: No focal deficit  present.      Mental Status: She is alert.            Vents:  Oxygen Concentration (%): 24 (01/24/24 2117)    Lines/Drains/Airways       Central Venous Catheter Line  Duration                  Hemodialysis Catheter 01/25/23 1501 right internal jugular 364 days              Drain  Duration                  Chest Tube 01/17/24 1504 Right Pleural 10 Fr. 7 days         Chest Tube 01/18/24 1537 Left Pleural 10 Fr. 6 days              Peripheral Intravenous Line  Duration                  Hemodialysis AV Fistula 09/01/23 0800 Left forearm 146 days         Midline Catheter Insertion/Assessment  - Single Lumen 01/22/24 1707 Right basilic vein (medial side of arm) 18g x 10cm 2 days                    Significant Labs:    CBC/Anemia Profile:  Recent Labs   Lab 01/24/24  1612 01/24/24  2357 01/25/24  0320   WBC 16.53* 18.84* 16.57*   HGB 8.0* 8.4* 8.3*   HCT 23.8* 25.5* 24.8*    177 172   MCV 94 96 96   RDW 18.8* 18.9* 18.8*        Chemistries:  Recent Labs   Lab 01/24/24  0219 01/25/24  0320   * 137   K 4.4 4.7    106   CO2 23 25   BUN 75* 53*   CREATININE 3.3* 2.9*   CALCIUM 7.9* 8.6*   ALBUMIN 1.8* 2.0*   PROT 4.4* 4.8*   BILITOT 1.0 0.6   ALKPHOS 75 83   ALT 10 7*   AST 17 13   MG 2.0 1.9   PHOS 2.7 3.0       All pertinent labs within the past 24 hours have been reviewed.    Significant Imaging:  I have reviewed all pertinent imaging results/findings within the past 24 hours.

## 2024-01-25 NOTE — PROGRESS NOTES
[facility]  Endocrinology  Progress Note    Admit Date: 2024     Reason for Consult: Management of T2DM, Hyperglycemia     Surgical Procedure and Date: 24--   LYMPHADENECTOMY (Right)  THORACOTOMY (Right)  THORACOTOMY, WITH INITIAL THERAPEUTIC WEDGE RESECTION OF LUNG (Right)    Diabetes diagnosis year: in her 50s    Home Diabetes Medications:    LDC SSI   Blood sugar 150 to 200, + 1 unit  Blood sugar 201 to 250, + 2 units  Blood sugar 251 to 300, + 3 units  Blood sugar 301 to 350, + 4 units   Blood sugar greater than 350, + 5 units    How often checking glucose at home? >4 x day   BG readings on regimen: 100-120s  Hypoglycemia on the regimen?  denies   Missed doses on regimen?  No    Diabetes Complications include:     Nephropathy     Complicating diabetes co morbidities:   ESRD and Glucocorticoid use       HPI:   Patient is a 73 y.o. female with DDD, benign essential tremor, meniere's disease, HTN, HLD, DM2, GERD, Celiac's, IBS who is found to have RML Carcinoid tumor.  Chest CT 10/18/23 revealed 1.2 x 1.1 cm right middle lobe nodule, previously 1.2 x 1.0 cm in 2023. Underwent Robotic Bronchoscopy 10/20/23; path: RML nodule positive for well-differentiated neuroendocrine neoplasm, carcinoid tumor. PET/CT Cu64 23 showed known RML nodule with hypermetabolic activity. Now presents s/p thoracotomy with wedge resection of right lung. Endocrine consulted for BG management.         Interval HPI:   Overnight events: No acute events overnight. Patient in room 63339 CVICU/83679 CVICU A. Blood glucose improving. BG at goal on current insulin regimen (SSI, prandial, and basal insulin ). Steroid use- None. 1 Day Post-Op  Renal function- Abnormal - Creatinine 2.9   Vasopressors-  None       Endocrine will continue to follow and manage insulin orders inpatient.         Diet Adult Regular (IDDSI Level 7)     Eatin%  Nausea: No  Hypoglycemia and intervention: No  Fever: No  TPN  "and/or TF: No      BP (!) 106/52   Pulse 75   Temp 97.6 °F (36.4 °C) (Oral)   Resp 11   Ht 5' 5" (1.651 m)   Wt 67.6 kg (149 lb)   LMP  (LMP Unknown) Comment: BEAU/ NO BSO  1986  SpO2 97%   Breastfeeding No   BMI 24.79 kg/m²     Labs Reviewed and Include    Recent Labs   Lab 01/25/24  0320   *   CALCIUM 8.6*   ALBUMIN 2.0*   PROT 4.8*      K 4.7   CO2 25      BUN 53*   CREATININE 2.9*   ALKPHOS 83   ALT 7*   AST 13   BILITOT 0.6     Lab Results   Component Value Date    WBC 14.79 (H) 01/25/2024    HGB 7.6 (L) 01/25/2024    HCT 23.8 (L) 01/25/2024    MCV 98 01/25/2024     01/25/2024     No results for input(s): "TSH", "FREET4" in the last 168 hours.  Lab Results   Component Value Date    HGBA1C 4.9 11/15/2023       Nutritional status:   Body mass index is 24.79 kg/m².  Lab Results   Component Value Date    ALBUMIN 2.0 (L) 01/25/2024    ALBUMIN 1.8 (L) 01/24/2024    ALBUMIN 1.7 (L) 01/23/2024     No results found for: "PREALBUMIN"    Estimated Creatinine Clearance: 15.5 mL/min (A) (based on SCr of 2.9 mg/dL (H)).    Accu-Checks  Recent Labs     01/23/24  0759 01/23/24  1106 01/23/24  1544 01/23/24  1547 01/23/24  2019 01/24/24  0742 01/24/24  1134 01/24/24  1616 01/24/24  2142 01/25/24  0813   POCTGLUCOSE 224* 250* 322* 316* 221* 191* 170* 133* 122* 129*       Current Medications and/or Treatments Impacting Glycemic Control  Immunotherapy:    Immunosuppressants       None          Steroids:   Hormones (From admission, onward)      None          Pressors:    Autonomic Drugs (From admission, onward)      Start     Stop Route Frequency Ordered    01/23/24 1715  midodrine tablet 10 mg         -- Oral 2 times daily with meals 01/23/24 1315    01/22/24 1045  metoprolol tartrate (LOPRESSOR) split tablet 12.5 mg         -- Oral 2 times daily 01/22/24 0932    01/22/24 0930  midodrine tablet 15 mg         -- Oral As needed (PRN) 01/22/24 0930          Hyperglycemia/Diabetes Medications: "   Antihyperglycemics (From admission, onward)      Start     Stop Route Frequency Ordered    01/26/24 0900  insulin detemir U-100 (Levemir) pen 6 Units         -- SubQ Daily 01/25/24 1013    01/25/24 1130  insulin aspart U-100 pen 2 Units         -- SubQ 3 times daily with meals 01/25/24 1013    01/19/24 1232  insulin aspart U-100 pen 0-10 Units         -- SubQ Before meals & nightly PRN 01/19/24 1232            ASSESSMENT and PLAN    Renal/  ESRD (end stage renal disease)    Titrate insulin slowly to avoid hypoglycemia as the risk of hypoglycemia increases with decreased creatinine clearance.      Oncology  * Malignant carcinoid tumor of bronchus  Managed per primary team        Endocrine  Type 2 diabetes mellitus with diabetic polyneuropathy, without long-term current use of insulin  BG goal: 140-180    - Levemir 6 units daily (20% reduction due to fasting blood glucose below goal.)   - Novolog 2 units with meals. (20% reduction due to prandial blood glucose below goal)   - Novolog Mod dose correction with ISF 25 starting at 150 prn   - POCT Glucose before meals and at bedtime   - Hypoglycemia protocol in place      ** Please notify Endocrine for any change and/or advance in diet**  ** Please call Endocrine for any BG related issues **     Discharge Planning:   TBD. Please notify endocrinology prior to discharge.             Pranav Perry, DNP, FNP  Endocrinology  [facility]

## 2024-01-25 NOTE — PLAN OF CARE
"      SICU PLAN OF CARE NOTE    Dx: Malignant carcinoid tumor of bronchus    Shift Events: NAEON    Goals of Care: MAP >55, SBP <180    Neuro: AAO x4, Follows Commands, and Moves All Extremities    Vital Signs: BP (!) 146/63 (BP Location: Right leg, Patient Position: Lying)   Pulse 67   Temp 97.7 °F (36.5 °C) (Oral)   Resp (!) 21   Ht 5' 5" (1.651 m)   Wt 67.1 kg (148 lb)   LMP  (LMP Unknown) Comment: BEAU/ NO BSO  1986  SpO2 100%   Breastfeeding No   BMI 24.63 kg/m²     Cardiac:NSR    Respiratory: Nasal Cannula    Diet: Regular Diet/renal    Urine Output: Anuric 0 cc/shift    Drains: Chest Tube, total output 110 cc /  shift     Labs/Accuchecks: Daily labs/accuchecks ACHS.    Skin: Bed plugged in with pads and foams in place. Pt assisted with turns q2 and as needed. R. Shoulder incision and bilateral chest tube sites noted upon assessment. No new skin integrity compromise noted. See flowsheet for skin and line details.      "

## 2024-01-25 NOTE — SUBJECTIVE & OBJECTIVE
Subjective:     Interval History: Followed up with patient s/p EGD yesterday. Likely candida esophagitis noted as well as food in stomach. BMs improving in color per RN. Blood counts have remained stable without further evidence of bleeding.     Review of Systems   Constitutional:  Positive for appetite change. Negative for activity change.   HENT:  Positive for trouble swallowing. Negative for sore throat.    Gastrointestinal:  Positive for blood in stool. Negative for abdominal distention, abdominal pain, anal bleeding, constipation, diarrhea, nausea, rectal pain and vomiting.   Skin:  Negative for color change and pallor.   Neurological:  Positive for weakness. Negative for dizziness.     Objective:     Vital Signs (Most Recent):  Temp: 97.6 °F (36.4 °C) (01/25/24 0700)  Pulse: 75 (01/25/24 1000)  Resp: 11 (01/25/24 1000)  BP: (!) 106/52 (01/25/24 0930)  SpO2: 97 % (01/25/24 1000) Vital Signs (24h Range):  Temp:  [97.4 °F (36.3 °C)-98.1 °F (36.7 °C)] 97.6 °F (36.4 °C)  Pulse:  [64-90] 75  Resp:  [9-21] 11  SpO2:  [87 %-100 %] 97 %  BP: ()/(42-72) 106/52     Weight: 67.6 kg (149 lb) (01/25/24 0600)  Body mass index is 24.79 kg/m².      Intake/Output Summary (Last 24 hours) at 1/25/2024 1027  Last data filed at 1/25/2024 0931  Gross per 24 hour   Intake 547 ml   Output 1860 ml   Net -1313 ml       Lines/Drains/Airways       Central Venous Catheter Line  Duration                  Hemodialysis Catheter 01/25/23 1501 right internal jugular 364 days              Drain  Duration                  Chest Tube 01/17/24 1504 Right Pleural 10 Fr. 7 days         Chest Tube 01/18/24 1537 Left Pleural 10 Fr. 6 days              Peripheral Intravenous Line  Duration                  Hemodialysis AV Fistula 09/01/23 0800 Left forearm 146 days         Midline Catheter Insertion/Assessment  - Single Lumen 01/22/24 1707 Right basilic vein (medial side of arm) 18g x 10cm 2 days                     Physical Exam  Vitals reviewed.    Constitutional:       Appearance: She is normal weight. She is not ill-appearing.   HENT:      Mouth/Throat:      Mouth: Mucous membranes are moist.      Pharynx: Oropharynx is clear.   Eyes:      General: No scleral icterus.  Abdominal:      General: Bowel sounds are normal. There is no distension.      Palpations: Abdomen is soft. There is no mass.   Skin:     General: Skin is warm and dry.      Capillary Refill: Capillary refill takes less than 2 seconds.      Coloration: Skin is not jaundiced or pale.   Neurological:      Mental Status: She is alert and oriented to person, place, and time. Mental status is at baseline.          Significant Labs:  CBC:   Recent Labs   Lab 01/24/24  2357 01/25/24  0320 01/25/24  0927   WBC 18.84* 16.57* 14.79*   HGB 8.4* 8.3* 7.6*   HCT 25.5* 24.8* 23.8*    172 161     CMP:   Recent Labs   Lab 01/25/24  0320   *   CALCIUM 8.6*   ALBUMIN 2.0*   PROT 4.8*      K 4.7   CO2 25      BUN 53*   CREATININE 2.9*   ALKPHOS 83   ALT 7*   AST 13   BILITOT 0.6         Significant Imaging:  Imaging results within the past 24 hours have been reviewed.

## 2024-01-25 NOTE — PROGRESS NOTES
[facility] - [facility] Intensive Care  Critical Care - Surgery  Progress Note    Patient Name: Lily Green  MRN: [id number]  Admission Date: 1/2/2024  Hospital Length of Stay: 23 days  Code Status: Full Code  Attending Provider: Tan Thomson MD  Primary Care Provider: Pavel Calixto MD   Principal Problem: Malignant carcinoid tumor of bronchus    Subjective:     Interval History/Significant Events: NAEON. EGD attempted yesterday buy aborted 2/2 to food in the mid esophagus. No continued signs of bleeding. Dark bowel movement assumed to be old blood. If signs of bleeding return, will call GI. Tolerated HD last night. Removed 1L of fluid. Has remained in NSR.     Follow-up For: Procedure(s) (LRB):  EGD (ESOPHAGOGASTRODUODENOSCOPY) (N/A)    Post-Operative Day: 1 Day Post-Op    Objective:     Vital Signs (Most Recent):  Temp: 97.7 °F (36.5 °C) (01/25/24 0300)  Pulse: 67 (01/25/24 0600)  Resp: 18 (01/25/24 0600)  BP: 124/60 (01/25/24 0600)  SpO2: 100 % (01/25/24 0600) Vital Signs (24h Range):  Temp:  [97.4 °F (36.3 °C)-98.1 °F (36.7 °C)] 97.7 °F (36.5 °C)  Pulse:  [64-90] 67  Resp:  [10-21] 18  SpO2:  [87 %-100 %] 100 %  BP: ()/(42-78) 124/60     Weight: 67.6 kg (149 lb)  Body mass index is 24.79 kg/m².      Intake/Output Summary (Last 24 hours) at 1/25/2024 0703  Last data filed at 1/25/2024 0300  Gross per 24 hour   Intake 400 ml   Output 1880 ml   Net -1480 ml          Physical Exam  Vitals and nursing note reviewed.   Constitutional:       Appearance: Normal appearance.   HENT:      Head: Normocephalic and atraumatic.      Nose: Nose normal.   Cardiovascular:      Rate and Rhythm: Normal rate and regular rhythm.      Pulses: Normal pulses.   Pulmonary:      Effort: Pulmonary effort is normal. No respiratory distress.      Comments: Right posterior chest incision c/d/I    Right chest permacath    Right Chest tube to waterseal   Left chest tube to waterseal   Abdominal:      General: Abdomen is  flat. There is no distension.      Palpations: Abdomen is soft.      Tenderness: There is no abdominal tenderness.      Comments: Soft, nontender abdomen   Musculoskeletal:      Right lower leg: Edema present.      Left lower leg: Edema present.   Skin:     General: Skin is warm and dry.      Comments: Groin soft, dressings c/d/i   Neurological:      General: No focal deficit present.      Mental Status: She is alert.            Vents:  Oxygen Concentration (%): 24 (01/24/24 2117)    Lines/Drains/Airways       Central Venous Catheter Line  Duration                  Hemodialysis Catheter 01/25/23 1501 right internal jugular 364 days              Drain  Duration                  Chest Tube 01/17/24 1504 Right Pleural 10 Fr. 7 days         Chest Tube 01/18/24 1537 Left Pleural 10 Fr. 6 days              Peripheral Intravenous Line  Duration                  Hemodialysis AV Fistula 09/01/23 0800 Left forearm 146 days         Midline Catheter Insertion/Assessment  - Single Lumen 01/22/24 1707 Right basilic vein (medial side of arm) 18g x 10cm 2 days                    Significant Labs:    CBC/Anemia Profile:  Recent Labs   Lab 01/24/24  1612 01/24/24  2357 01/25/24  0320   WBC 16.53* 18.84* 16.57*   HGB 8.0* 8.4* 8.3*   HCT 23.8* 25.5* 24.8*    177 172   MCV 94 96 96   RDW 18.8* 18.9* 18.8*        Chemistries:  Recent Labs   Lab 01/24/24  0219 01/25/24  0320   * 137   K 4.4 4.7    106   CO2 23 25   BUN 75* 53*   CREATININE 3.3* 2.9*   CALCIUM 7.9* 8.6*   ALBUMIN 1.8* 2.0*   PROT 4.4* 4.8*   BILITOT 1.0 0.6   ALKPHOS 75 83   ALT 10 7*   AST 17 13   MG 2.0 1.9   PHOS 2.7 3.0       All pertinent labs within the past 24 hours have been reviewed.    Significant Imaging:  I have reviewed all pertinent imaging results/findings within the past 24 hours.  Assessment/Plan:     Cardiac/Vascular  Atrial fibrillation  74 yo female with carcinoid tumor of the right middle lobe s/p thoracotomy and resection with  mediastinal lymph node dissection 1/2/2023. Stepped up to the unit for persistent afib rvr. Had SBO (now resolved). 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  Pacemaker placed on 01/16. R Chest tube placed w IR on 1/17. L sided chest tube placed w IR on 1/18. CXR stable. Pleural cultures NGTD.      Neuro/Psych:   -- Sedation: none  -- Pain: Lidocaine patch   -- has anxiety, not on medications at home   - started on venlafaxine 37.5 mg QD     Cards:   -- BP goals SBP >90, MAPs >55   - Midodrine 5mg BID   - Midodrine 15mg additional with HD  -- Afib RVR, last occurred on 1/19/24 after HD   - Now back in NSR   - continue metoprolol tartrate 12.5 mg BID, hold if soft BP   - continue heparin gtt for anticoagulation   - prn iv metoprolol for sustained RVR > 130's   - If IV Metop fails or if hypotensive start Amio  -- Asystolic arrest, now s/p PPM 1/16 rate set to 60 bmp   - 1/12/24 patient asystole with 1 round of compressions and achievement of ROSC. Multiple bradycardic events prior to which had self resolved. STAT TVP placement unsuccessful 2/2 central venous stenosis. 2nd episode of asystole resolved after minimal compressions. 3rd episode of asystole resolved with singular compression.   - S/p TVP via groin in Cath lab 1/12, removed 1/16   - PPM 1/16 @ 60, heparin gtt held 5x days post op. Now continue to hold 2/2 GI bleed   - high digoxin level now s/p digibind 1/12 and converted to NSR after administration  - dig level now WNL x 2     Pulm:   -- Goal O2 sat > 90%   - satting 100% on RA  -- Surgical chest tube d/c'd 1/8/24  -- CXR with pleural effusions and RLL opacification  -- CT chest (1/13): R worse than L pleural effusion, pericardial effusion   -- IR placed bilateral chest tubes for pleural effusions   -- R CT placed 1/17   -- L CT placed 1/18    -- Plan to remove L CT on 1/25  -- continue incentive spirometry      Renal:  -- history of ESRD, dialysis  dependent   - HD per nephrology, additional midodrine 15mg On Call PRN to be administered prior to HD   - AVF okay to use per vascular  -- trend BUN/Cr  -- holding sevelamer in setting of poor PO intake and low daily phos  -- patient is anuric vs oliguric  -- permacath in place last exchanged 10/2023  -- oxybutynin to reduce bladder spasm possibly triggering eulogio episodes    Recent Labs   Lab 01/23/24  0500 01/24/24  0219 01/25/24  0320   BUN 34* 75* 53*   CREATININE 2.7* 3.3* 2.9*           FEN / GI:   -- CT on 1/8/24 demonstrating SBO, now resolved  -- US on 1/8/24 showing distended gallbladder without signs of acute cholecystitis   - NGT removed  - Equivocal GGC 1/10  -- 1/23/24 had multiple large volume bloody bowel movements.   - Heparin stopped and protamine administered, GI consulted.    - EGD 1/24/24    - Aborted for food in mid esophagus   - IV PPI BID   - H/H stable. Monitor for signs of GI bleed.   -- Replace lytes as needed  -- Nutrition:  - Dietician consulted for complex dietary restriction  - home appetite stimulant dronabinol 5 mg BID  -- Bowel regiment: miralax and doc-senna      ID:   -- Tm: afebrile; WBC down trending  -- C/f HAP with RLL consolidation, ID following  -- MRSA neg, dc vanc  -- zosyn broadened to meropenem given pleural effusions, completed Merrem on 1/20/24  -- Start ceftriaxone 2 gm IV daily plus PO metronidazole 500 mg BID on 1/21/24  -- Transitioned ceftriaxone to cefpodoxime (renally dosed)   -- Continue flagyl 500 mg BID and cefpodoxime 200 mg QHS for 4 weeks (end date: 2/15/24)  -- blood cultures with 1 bottle growing staph epi, likely a contaminant  -- pleural cultures (1/18): NGTD  -- Candida esophagitis seen on EGD    -- fluconazole 400mg for 1 dose then fluconazole 200mg daily for 14 days    Recent Labs   Lab 01/24/24  1612 01/24/24  2357 01/25/24  0320   WBC 16.53* 18.84* 16.57*           Heme/Onc:  -- Daily CBC  -- heparin gtt for A-fib, held 5 days after PPM 1/16,  restarted 1/22, stopped 1/23/24 after bloody stools  -- S/p 1 unit pRBC's on 1/20/24 for Hb 6.8  -- s/p 2u pRBC on 1/23/24 for Hb of 5.5 after bloody stools    Recent Labs   Lab 01/22/24  0811 01/22/24  1536 01/23/24  1104 01/23/24  1221 01/23/24  1544 01/23/24  2347 01/24/24  1612 01/24/24  2357 01/25/24  0320   HGB  --    < >  --   --  5.5*   < > 8.0* 8.4* 8.3*   PLT  --    < >  --   --  225   < > 199 177 172   APTT 22.9   < > >150.0* 69.0* 80.9*  --   --   --   --    INR 1.1  --   --   --  1.3*  --   --   --   --     < > = values in this interval not displayed.           Endo:   -- history of diabetes  -- Gluc goal 140-180  -- detemir 3u w/ meal  -- SSI      PPx:   Feeding:adult regular diet  Analgesia/Sedation: Pain is controlled, no sedation  Thromboembolic prevention: heparin off 2/2 GI bleed  HOB >30: yes  Stress Ulcer ppx: pantoprazole IV BID  Glucose control: Critical care goal 140-180 g/dl, detemir and SSI  Bowel reg: miralax and doc-senna  Invasive Lines/Drains/Airway: Permacath, Bilateral chest tubes, PIV, midline  Deescalation: d/c left chest tube, ok for step down        Dispo/Code Status/Palliative:   -- Full Code  -- Step down       Critical care was time spent personally by me on the following activities: development of treatment plan with patient or surrogate and bedside caregivers, discussions with consultants, evaluation of patient's response to treatment, examination of patient, ordering and performing treatments and interventions, ordering and review of laboratory studies, ordering and review of radiographic studies, pulse oximetry, re-evaluation of patient's condition.  This critical care time did not overlap with that of any other provider or involve time for any procedures.     Prabhu Esqueda MD  Critical Care - Surgery  [facility] - Surgical Intensive Care

## 2024-01-25 NOTE — PT/OT/SLP PROGRESS
"Occupational Therapy   Co-Treatment    Name: Lily Green  MRN: [id number]  Admitting Diagnosis:  Malignant carcinoid tumor of bronchus  1 Day Post-Op    Recommendations:     Discharge Recommendations: Moderate Intensity Therapy  Discharge Equipment Recommendations:   (TBD closer to d/c)  Barriers to discharge:  None    Assessment:     Lily Green is a 73 y.o. female with a medical diagnosis of Malignant carcinoid tumor of bronchus.  She presents with progress towards goals as evidenced by ability to tolerate static standing for longer period during toileting and ability to mobilize to chair. Performance deficits affecting function are weakness, impaired self care skills, impaired balance, decreased safety awareness, impaired functional mobility, impaired endurance, gait instability, impaired cardiopulmonary response to activity. Pt benefits from co-treatment with PT to accommodate pt's activity tolerance and progression with therapy.      Rehab Prognosis:  Good; patient would benefit from acute skilled OT services to address these deficits and reach maximum level of function.       Plan:     Patient to be seen 4 x/week to address the above listed problems via self-care/home management, therapeutic activities, therapeutic exercises  Plan of Care Expires: 02/02/24  Plan of Care Reviewed with: patient, spouse    Subjective     Chief Complaint: "I'm nauseous"  Patient/Family Comments/goals: to get better  Pain/Comfort:  Pain Rating 1: 0/10  Location 1:  (denies pain, but endorses nausea)  Pain Addressed 1: Nurse notified  Pain Rating Post-Intervention 1: 0/10    Objective:     Communicated with: RN prior to session.  Patient found supine with blood pressure cuff, telemetry, pulse ox (continuous), chest tube (Port-A-Cath) upon OT entry to room.    General Precautions: Standard, fall    Orthopedic Precautions:N/A  Braces: N/A  Respiratory Status: Room air     Occupational Performance:     Bed " Mobility:    Patient completed Scooting/Bridging with maximal assistance  Patient completed Supine to Sit with maximal assistance     Functional Mobility/Transfers:  Patient completed Sit <> Stand Transfer with minimum assistance  with  no assistive device   Patient completed Bed <> Chair Transfer using Step Transfer technique with minimum assistance with hand-held assist  Functional Mobility: Min A x 2 with HHA to chair x ~8 feet    Activities of Daily Living:  Grooming: stand by assistance for washing face and brushing teeth in sitting  Upper Body Dressing: moderate assistance donning back gown as a robe  Lower Body Dressing: total assistance donning socks      [facility] 6 Click ADL: 12    Treatment & Education:  Pt ed on OT POC  Pt ed on importance of activity progression  Pt ed on PLB to calm self when anxious  Pt ed on ROM ex's 3x daily for increased overall strength and endurance    Patient left up in chair with all lines intact, call button in reach, RN notified, and spouse present    GOALS:   Multidisciplinary Problems       Occupational Therapy Goals          Problem: Occupational Therapy    Goal Priority Disciplines Outcome Interventions   Occupational Therapy Goal     OT, PT/OT Ongoing, Progressing    Description: Goals to be met by: 02/2/24     Patient will increase functional independence with ADLs by performing:    UE Dressing with Supervision.  Grooming while standing at sink with Modified Crisp.  Toileting from toilet with Modified Crisp for hygiene and clothing management.   Supine to sit with Supervision.  Toilet transfer to toilet with Supervision.                         Time Tracking:     OT Date of Treatment: 01/25/24  OT Start Time: 1036  OT Stop Time: 1100  OT Total Time (min): 24 min    Billable Minutes:Self Care/Home Management 8  Therapeutic Activity 15               1/25/2024

## 2024-01-25 NOTE — ASSESSMENT & PLAN NOTE
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 Hgb 5.5, PTT 80.9, INR 1.3 as of 1/23, now s/p 3 U pRBCs, FFP and protamine without further episodes of bleeding. She remains HDS not requiring pressors. EGD yesterday which was notable for food present in stomach. Also with severe candida.     Recommendations:  - Mildly severe candidiasis esophagitis with no bleeding. Recommend antifungal agent for the treatment of candida esophagitis. Can consider Diflucan 200mg on day 1 and then Diflucan 100mg daily for 14 days.   - Can consider restarting anticoagulation, but would continue PPI   - If ongoing evidence of active GI bleeding, please call the GI team to consider repeat EGD.

## 2024-01-25 NOTE — PT/OT/SLP PROGRESS
Physical Therapy  Co-Treatment with OT    Patient Name:  Lily Green   MRN:  [id number]    Recommendations:     Discharge Recommendations: Moderate Intensity Therapy  Discharge Equipment Recommendations:  (will determine DME needs closer to discharge)  Barriers to discharge: Decreased caregiver support    Assessment:     Lily Green is a 73 y.o. female admitted with a medical diagnosis of Malignant carcinoid tumor of bronchus.  She presents with the following impairments/functional limitations: weakness, impaired balance, decreased safety awareness, impaired endurance, impaired functional mobility, gait instability, decreased lower extremity function pt tolerated treatment better being able to begin gait training. Pt will benefit from cont skilled PT to progress physically.  Pt is significantly below previous functional level, increased risk of falls and increased burden of care currently.   Patient continues to demonstrate the need for moderate intensity therapy on a daily basis post acute exhibited by decreased independence with functional mobility     Pt was evaluated 1/3 with dx of malignant CA tumor of bronchus. Pt is s/p lymphadenectomy, thoracotomy, resection wedge R lung 1/2. Pt was transferred to ICU 1/5 with tachycardia with syncope. pt is s/p PPM placement 1/16 through groin with no pacemaker precautions.         Rehab Prognosis: Good; patient would benefit from acute skilled PT services to address these deficits and reach maximum level of function.    Recent Surgery: Procedure(s) (LRB):  EGD (ESOPHAGOGASTRODUODENOSCOPY) (N/A) 1 Day Post-Op    Plan:     During this hospitalization, patient to be seen 4 x/week to address the identified rehab impairments via gait training, therapeutic activities, therapeutic exercises, neuromuscular re-education and progress toward the following goals:    Plan of Care Expires:  02/09/24    Subjective     Chief Complaint: pt c/o nausea with sitting on  EOB.   Patient/Family Comments/goals: to get better and go home.   Pain/Comfort:  Pain Rating 1: 0/10  Pain Rating Post-Intervention 1: 0/10      Objective:     Communicated with nurse  prior to session.  Patient found supine with telemetry, pulse ox (continuous), SCD, chest tube (port a cath) upon PT entry to room.     General Precautions: Standard, fall  Orthopedic Precautions: N/A  Braces: N/A  Respiratory Status: Room air     Functional Mobility:  Bed Mobility:   pt needed verbal cues for hand placement and sequencing for functional mobility.   Rolling Left:  maximal assistance  Supine to Sit: maximal assistance    Transfers:     Sit to Stand:  minimum assistance with hand-held assist. Pt stood x 2 reps.     Gait: pt received gait training ~ 4 ft with min assist x 2    Balance: pt sat on EOB with SBA.     Due to pt complex medical condition, the skill of 2 licensed therapists is needed to maximize treatment session and progression towards goals  Pt white board updated with current therapists name and level of mobility assistance needed.         AM-PAC 6 CLICK MOBILITY  Turning over in bed (including adjusting bedclothes, sheets and blankets)?: 2  Sitting down on and standing up from a chair with arms (e.g., wheelchair, bedside commode, etc.): 3  Moving from lying on back to sitting on the side of the bed?: 2  Moving to and from a bed to a chair (including a wheelchair)?: 3  Need to walk in hospital room?: 2  Climbing 3-5 steps with a railing?: 1  Basic Mobility Total Score: 13       Treatment & Education:  Pt and  received verbal instructions in PT POC and both verbally expressed understanding of such.     Patient left up in chair with all lines intact, call button in reach, RN  notified, and OT and pt  present..    GOALS:   Multidisciplinary Problems       Physical Therapy Goals          Problem: Physical Therapy    Goal Priority Disciplines Outcome Goal Variances Interventions   Physical Therapy  Goal     PT, PT/OT Ongoing, Progressing     Description: PT goals until 2/9/24    1. Pt supine to sit with supervision-not met  2. Pt sit to supine with supervision-not met  3. Pt sit to stand with AD if needed with supervision-not met  4. Pt to perform gait 150 ft with AD if needed with supervision.-not met  5. Pt to go up/down curb step with AD if needed with CGA.-not met                         Time Tracking:     PT Received On: 01/25/24  PT Start Time: 1035     PT Stop Time: 1054  PT Total Time (min): 19 min     Billable Minutes: Gait Training 19 min     Treatment Type: Treatment  PT/PTA: PT     Number of PTA visits since last PT visit: 0     01/25/2024

## 2024-01-25 NOTE — PROGRESS NOTES
HD completed yesterday. Net UF 1 L.   Electrolytes stable.   HD tomorrow.     MARILYN Melissa DNP, APRN, FNP-C  Department of Nephrology  [facility]  Pager: [phone]

## 2024-01-25 NOTE — PROGRESS NOTES
Patient just arrived to floor. VSS. Weepy right arm noted, redness on that right elbow crease noted as well. BP obtained on right lower extremity. No complaints of pain at this time. Oriented to room.  present at bedside

## 2024-01-25 NOTE — PROGRESS NOTES
[facility]  Cardiothoracic Surgery  Progress Note    Patient Name: Lily Green  MRN: [id number]  Admission Date: 1/2/2024  Hospital Length of Stay: 23 days  Code Status: Full Code   Attending Physician: Tan Thomson MD   Referring Provider: Tan Thomson MD  Principal Problem:Malignant carcinoid tumor of bronchus      Subjective:     Post-Op Info:  Procedure(s) (LRB):  EGD (ESOPHAGOGASTRODUODENOSCOPY) (N/A)   1 Day Post-Op     Interval History: EGD terminated yesterday due to persistent food in the stomach despite appropriate NPO status. No acute events overnight. Remains hemodynamically stable with anticoagulation held. One dark BM overnight, Hg stable. Decreased CT outputs.      Medications:  Continuous Infusions:      Scheduled Meds:   cefpodoxime  200 mg Oral QHS    droNABinol  5 mg Oral BID    epoetin noble (PROCRIT) injection  4,000 Units Intravenous Every Mon, Wed, Fri    erythromycin ethylsuccinate  200 mg Oral QID (WM & HS)    [START ON 1/26/2024] fluconazole  200 mg Oral Daily    insulin aspart U-100  3 Units Subcutaneous TIDWM    insulin detemir U-100  7 Units Subcutaneous Daily    LIDOcaine  1 patch Transdermal Q24H    metoprolol tartrate  12.5 mg Oral BID    metroNIDAZOLE  500 mg Oral Q12H    midodrine  10 mg Oral BID WM    oxybutynin  5 mg Oral TID    pantoprazole  40 mg Intravenous BID    polyethylene glycol  17 g Oral Daily    senna-docusate 8.6-50 mg  1 tablet Oral Daily    sodium chloride 0.9%  10 mL Intravenous Q6H    venlafaxine  37.5 mg Oral Daily     PRN Meds:sodium chloride 0.9%, acetaminophen, albuterol sulfate, bisacodyL, dextrose 10%, dextrose 10%, glucagon (human recombinant), glucose, glucose, heparin (porcine), hydrALAZINE, insulin aspart U-100, midodrine, ondansetron, sodium chloride 0.9%, Flushing PICC/Midline Protocol **AND** sodium chloride 0.9% **AND** sodium chloride 0.9%     Objective:     Vital Signs (Most Recent):  Temp: 97.6 °F (36.4  °C) (01/25/24 0700)  Pulse: 72 (01/25/24 0900)  Resp: 17 (01/25/24 0900)  BP: (!) 110/53 (01/25/24 0900)  SpO2: 100 % (01/25/24 0900) Vital Signs (24h Range):  Temp:  [97.4 °F (36.3 °C)-98.1 °F (36.7 °C)] 97.6 °F (36.4 °C)  Pulse:  [64-90] 72  Resp:  [9-21] 17  SpO2:  [87 %-100 %] 100 %  BP: ()/(42-72) 110/53     Weight: 67.6 kg (149 lb)  Body mass index is 24.79 kg/m².    SpO2: 100 %       Intake/Output - Last 3 Shifts         01/23 0700 01/24 0659 01/24 0700 01/25 0659 01/25 0700 01/26 0659    P.O. 282 350 222    I.V. (mL/kg) 18.6 (0.3)      Blood 286      Other       IV Piggyback  50     Total Intake(mL/kg) 586.6 (8.7) 400 (5.9) 222 (3.3)    Urine (mL/kg/hr) 0 (0)      Other  1600     Stool 0 0     Chest Tube 620 280 20    Total Output 620 1880 20    Net -33.4 -1480 +202           Urine Occurrence 1 x      Stool Occurrence 7 x 1 x             Lines/Drains/Airways       Central Venous Catheter Line  Duration                  Hemodialysis Catheter 01/25/23 1501 right internal jugular 364 days              Drain  Duration                  Chest Tube 01/17/24 1504 Right Pleural 10 Fr. 7 days         Chest Tube 01/18/24 1537 Left Pleural 10 Fr. 6 days              Peripheral Intravenous Line  Duration                  Hemodialysis AV Fistula 09/01/23 0800 Left forearm 146 days         Midline Catheter Insertion/Assessment  - Single Lumen 01/22/24 1707 Right basilic vein (medial side of arm) 18g x 10cm 2 days                       Physical Exam  Vitals and nursing note reviewed.   Constitutional:       Appearance: No acute distress  Cardiovascular:      Rate and Rhythm: Normal rate  Pulmonary:      Effort: Pulmonary effort is normal. No respiratory distress.      Comments: Serous CT output bilaterally  Abdominal:      General: Abdomen is flat. There is no distension.   Skin:     General: Skin is warm and dry.   Neurological:      General: No focal deficit present.      Mental Status: She is alert.         Significant Labs:  All pertinent labs from the last 24 hours have been reviewed.    Significant Diagnostics: All pertinent imaging from the last 24 hours have been reviewed.  Assessment/Plan:     * Malignant carcinoid tumor of bronchus  74 yo female with ESRD (TTS), DDD, benign essential tremor, meniere's disease, HTN, HLD, DM2, GERD, Celiac's, IBS and 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 Stepped up to the ICU 1/5 for afib RVR which was refractory to medical management and c/b development of tachy-eulogio syndrome now s/p micra placement 1/16 for tachy-eulogio syndrome in the setting of RVR. Did have brief SBO type picture 1/7 which resolved with conservative management. EGD 1/24 for suspected upper GI bleed was terminated due to presence of food in the stomach.     - s/p R thoracotomy with R lower lobectomy for carcinoid w/ MLND; IPV injury requiring repair. CT removed post operatively.  - Refractory afib RVR requiring ICU step up 1/05, s/p chemical cardioversion initially with recurrence c/b developing tachy-eulogio syndrome    - cards and EP following, appreciate assistance            - AC indicated 2/2 intermittent atrial fib episodes and chadsvasc score of 2    - AC currently held in the setting of resolving/resolved GI bleed    - s/p TVP placement and now micra implantation 1/16    - Heparin gtt resumed 1/22; will need to transition to eliquis likely following CT removal; held 1/23 2/2 GI bleed   - CT chest 1/13 w/ bilateral pleural effusion (R>L), pericardial effusion,   - s/p R chest tube placement with IR 1/17, L chest tube placement 1/18  - Remove left chest tube today, keep right to water seal  - Daily CXR while CT are in place   - Pulmonary toilet: IS, Acapella, Mucomyst daily   - Infectious work up 2/2 leukocytosis. She remains afebrile. Effusions and compressive atelectasis also likely contributing to leukocytosis.Trended  up to 30k 1/16, now downtrending. Zosyn 1/11-1/15; Meropenam 1/16 - 1/20; Plan for Ceftriaxone and metronidazole to start 1/21 per ID. 4 week abx plan for empiric treatment of presumed parapneumonic effusion     - ID following, appreciate assistance. Reconsult following thora results.   - Bcx 1/11: Staph, probable contaminant  - Bcx1/12: NGTD   - Rapid blood PCR: staph epi  - Sputum Cx 1/11: klebsiella & e. Coli  - R pleural fluid Cx 1/17: NGTD  - SBO type episode 1/07 requiring NGT decompression, which resolved with conservative management   - GI bleed 1/23/2024 - heparin held, 2uPRBC, 1uFFP, heparin reversed. GI consulted                       - EGD 1/24 terminated due to food in the stomach    - Promotility agent started for gastroparesis   - Renal, gluten free diet, novasource renal BID; marinol for appetite   - Bowel regimen, PPI  - Nephrology following, appreciate assistance. CRRT 1/17 overnight. HD trial 1/19 passed but requiring midodrine, 1.5 L LR, metoprolol for RVR. HD 1/22 overnight tolerated much better with midodrine given prior            - LUE AVF created 10.2023, US 1/22/2023 with good flow metrics, okay to use per vascular   - Oxybutinin for bladder spasms   - MM pain control as needed  - Continue PT/OT, mobilization, likely post hospital placement         Dispo: Okay for step down today         Mandeep Tripp, DO  Cardiothoracic Surgery  [facility] - [facility]

## 2024-01-25 NOTE — PROGRESS NOTES
Patients right arm appears red and hot to touch- this nurse spoke with Dr Kat Flores, patient used to have PIV in that location Dr Flores states. Instructed at this time to monitor area

## 2024-01-25 NOTE — ASSESSMENT & PLAN NOTE
BG goal: 140-180    - Levemir 6 units daily (20% reduction due to fasting blood glucose below goal.)   - Novolog 2 units with meals. (20% reduction due to prandial blood glucose below goal)   - Novolog Mod dose correction with ISF 25 starting at 150 prn   - POCT Glucose before meals and at bedtime   - Hypoglycemia protocol in place      ** Please notify Endocrine for any change and/or advance in diet**  ** Please call Endocrine for any BG related issues **     Discharge Planning:   TBD. Please notify endocrinology prior to discharge.

## 2024-01-25 NOTE — PLAN OF CARE
Recommendations     1. Recommend to change diet back to Renal, gluten free diet with texture per SLP                   - texture advance per SLP/MD                 - this is the diet pt follows at home.     2. Rec'd continuing ONS Novasource renal BID       3. Recommend considering psyllium husk BID to help bulk up stools.      4.) Recommend adding daily probiotic to help with GI issues.      5.) RD to monitor and follow     Goals: Meet % EEN, EPN by RD f/u  Nutrition Goal Status: progressing towards goal  Communication of RD Recs:  (POC)

## 2024-01-26 ENCOUNTER — ANESTHESIA (OUTPATIENT)
Dept: ENDOSCOPY | Facility: HOSPITAL | Age: 74
DRG: 163 | End: 2024-01-26
Payer: MEDICARE

## 2024-01-26 ENCOUNTER — ANESTHESIA EVENT (OUTPATIENT)
Dept: ENDOSCOPY | Facility: HOSPITAL | Age: 74
DRG: 163 | End: 2024-01-26
Payer: MEDICARE

## 2024-01-26 LAB
ALBUMIN SERPL BCP-MCNC: 2 G/DL (ref 3.5–5.2)
ALP SERPL-CCNC: 84 U/L (ref 55–135)
ALT SERPL W/O P-5'-P-CCNC: 5 U/L (ref 10–44)
ANION GAP SERPL CALC-SCNC: 5 MMOL/L (ref 8–16)
AST SERPL-CCNC: 13 U/L (ref 10–40)
BASOPHILS # BLD AUTO: 0.07 K/UL (ref 0–0.2)
BASOPHILS NFR BLD: 0.6 % (ref 0–1.9)
BILIRUB SERPL-MCNC: 1.1 MG/DL (ref 0.1–1)
BUN SERPL-MCNC: 63 MG/DL (ref 8–23)
CALCIUM SERPL-MCNC: 8.5 MG/DL (ref 8.7–10.5)
CHLORIDE SERPL-SCNC: 104 MMOL/L (ref 95–110)
CO2 SERPL-SCNC: 25 MMOL/L (ref 23–29)
CREAT SERPL-MCNC: 3.7 MG/DL (ref 0.5–1.4)
DIFFERENTIAL METHOD BLD: ABNORMAL
EOSINOPHIL # BLD AUTO: 0.1 K/UL (ref 0–0.5)
EOSINOPHIL NFR BLD: 0.7 % (ref 0–8)
ERYTHROCYTE [DISTWIDTH] IN BLOOD BY AUTOMATED COUNT: 26 % (ref 11.5–14.5)
EST. GFR  (NO RACE VARIABLE): 12.4 ML/MIN/1.73 M^2
GLUCOSE SERPL-MCNC: 127 MG/DL (ref 70–110)
HCT VFR BLD AUTO: 29.2 % (ref 37–48.5)
HGB BLD-MCNC: 9.6 G/DL (ref 12–16)
IMM GRANULOCYTES # BLD AUTO: 0.22 K/UL (ref 0–0.04)
IMM GRANULOCYTES NFR BLD AUTO: 2 % (ref 0–0.5)
LYMPHOCYTES # BLD AUTO: 1.3 K/UL (ref 1–4.8)
LYMPHOCYTES NFR BLD: 11.8 % (ref 18–48)
MAGNESIUM SERPL-MCNC: 1.9 MG/DL (ref 1.6–2.6)
MCH RBC QN AUTO: 28.9 PG (ref 27–31)
MCHC RBC AUTO-ENTMCNC: 32.9 G/DL (ref 32–36)
MCV RBC AUTO: 88 FL (ref 82–98)
MONOCYTES # BLD AUTO: 1.4 K/UL (ref 0.3–1)
MONOCYTES NFR BLD: 12.7 % (ref 4–15)
NEUTROPHILS # BLD AUTO: 8.1 K/UL (ref 1.8–7.7)
NEUTROPHILS NFR BLD: 72.2 % (ref 38–73)
NRBC BLD-RTO: 0 /100 WBC
PHOSPHATE SERPL-MCNC: 3.6 MG/DL (ref 2.7–4.5)
PLATELET # BLD AUTO: 184 K/UL (ref 150–450)
PMV BLD AUTO: 10.3 FL (ref 9.2–12.9)
POCT GLUCOSE: 133 MG/DL (ref 70–110)
POCT GLUCOSE: 139 MG/DL (ref 70–110)
POCT GLUCOSE: 145 MG/DL (ref 70–110)
POCT GLUCOSE: 86 MG/DL (ref 70–110)
POTASSIUM SERPL-SCNC: 4.6 MMOL/L (ref 3.5–5.1)
PROT SERPL-MCNC: 4.6 G/DL (ref 6–8.4)
RBC # BLD AUTO: 3.32 M/UL (ref 4–5.4)
SODIUM SERPL-SCNC: 134 MMOL/L (ref 136–145)
WBC # BLD AUTO: 11.23 K/UL (ref 3.9–12.7)

## 2024-01-26 PROCEDURE — 94761 N-INVAS EAR/PLS OXIMETRY MLT: CPT

## 2024-01-26 PROCEDURE — 25000003 PHARM REV CODE 250: Performed by: STUDENT IN AN ORGANIZED HEALTH CARE EDUCATION/TRAINING PROGRAM

## 2024-01-26 PROCEDURE — 90935 HEMODIALYSIS ONE EVALUATION: CPT

## 2024-01-26 PROCEDURE — 82962 GLUCOSE BLOOD TEST: CPT | Performed by: STUDENT IN AN ORGANIZED HEALTH CARE EDUCATION/TRAINING PROGRAM

## 2024-01-26 PROCEDURE — 20600001 HC STEP DOWN PRIVATE ROOM

## 2024-01-26 PROCEDURE — 63600175 PHARM REV CODE 636 W HCPCS

## 2024-01-26 PROCEDURE — A4216 STERILE WATER/SALINE, 10 ML: HCPCS | Performed by: STUDENT IN AN ORGANIZED HEALTH CARE EDUCATION/TRAINING PROGRAM

## 2024-01-26 PROCEDURE — 25000003 PHARM REV CODE 250: Performed by: NURSE ANESTHETIST, CERTIFIED REGISTERED

## 2024-01-26 PROCEDURE — 43235 EGD DIAGNOSTIC BRUSH WASH: CPT | Mod: GC,,, | Performed by: STUDENT IN AN ORGANIZED HEALTH CARE EDUCATION/TRAINING PROGRAM

## 2024-01-26 PROCEDURE — 43235 EGD DIAGNOSTIC BRUSH WASH: CPT | Performed by: STUDENT IN AN ORGANIZED HEALTH CARE EDUCATION/TRAINING PROGRAM

## 2024-01-26 PROCEDURE — 84100 ASSAY OF PHOSPHORUS: CPT

## 2024-01-26 PROCEDURE — 90935 HEMODIALYSIS ONE EVALUATION: CPT | Mod: ,,, | Performed by: NURSE PRACTITIONER

## 2024-01-26 PROCEDURE — 99900035 HC TECH TIME PER 15 MIN (STAT)

## 2024-01-26 PROCEDURE — D9220A PRA ANESTHESIA: Mod: ANES,,, | Performed by: ANESTHESIOLOGY

## 2024-01-26 PROCEDURE — 25000003 PHARM REV CODE 250

## 2024-01-26 PROCEDURE — 27000221 HC OXYGEN, UP TO 24 HOURS

## 2024-01-26 PROCEDURE — 80053 COMPREHEN METABOLIC PANEL: CPT | Performed by: STUDENT IN AN ORGANIZED HEALTH CARE EDUCATION/TRAINING PROGRAM

## 2024-01-26 PROCEDURE — D9220A PRA ANESTHESIA: Mod: CRNA,,, | Performed by: NURSE ANESTHETIST, CERTIFIED REGISTERED

## 2024-01-26 PROCEDURE — 63600175 PHARM REV CODE 636 W HCPCS: Performed by: ANESTHESIOLOGY

## 2024-01-26 PROCEDURE — 25000003 PHARM REV CODE 250: Performed by: NURSE PRACTITIONER

## 2024-01-26 PROCEDURE — 99232 SBSQ HOSP IP/OBS MODERATE 35: CPT | Mod: 25,,,

## 2024-01-26 PROCEDURE — 37000009 HC ANESTHESIA EA ADD 15 MINS: Performed by: STUDENT IN AN ORGANIZED HEALTH CARE EDUCATION/TRAINING PROGRAM

## 2024-01-26 PROCEDURE — 63600175 PHARM REV CODE 636 W HCPCS: Mod: JZ | Performed by: NURSE PRACTITIONER

## 2024-01-26 PROCEDURE — 0DJ08ZZ INSPECTION OF UPPER INTESTINAL TRACT, VIA NATURAL OR ARTIFICIAL OPENING ENDOSCOPIC: ICD-10-PCS | Performed by: STUDENT IN AN ORGANIZED HEALTH CARE EDUCATION/TRAINING PROGRAM

## 2024-01-26 PROCEDURE — 85025 COMPLETE CBC W/AUTO DIFF WBC: CPT | Performed by: STUDENT IN AN ORGANIZED HEALTH CARE EDUCATION/TRAINING PROGRAM

## 2024-01-26 PROCEDURE — 25000003 PHARM REV CODE 250: Performed by: SURGERY

## 2024-01-26 PROCEDURE — 83735 ASSAY OF MAGNESIUM: CPT

## 2024-01-26 PROCEDURE — 37000008 HC ANESTHESIA 1ST 15 MINUTES: Performed by: STUDENT IN AN ORGANIZED HEALTH CARE EDUCATION/TRAINING PROGRAM

## 2024-01-26 PROCEDURE — 63600175 PHARM REV CODE 636 W HCPCS: Performed by: NURSE ANESTHETIST, CERTIFIED REGISTERED

## 2024-01-26 RX ORDER — HYDRALAZINE HYDROCHLORIDE 20 MG/ML
10 INJECTION INTRAMUSCULAR; INTRAVENOUS
Status: DISCONTINUED | OUTPATIENT
Start: 2024-01-26 | End: 2024-01-26 | Stop reason: SDUPTHER

## 2024-01-26 RX ORDER — SUCCINYLCHOLINE CHLORIDE 20 MG/ML
INJECTION INTRAMUSCULAR; INTRAVENOUS
Status: DISCONTINUED | OUTPATIENT
Start: 2024-01-26 | End: 2024-01-26

## 2024-01-26 RX ORDER — ONDANSETRON HYDROCHLORIDE 2 MG/ML
4 INJECTION, SOLUTION INTRAVENOUS DAILY PRN
Status: DISCONTINUED | OUTPATIENT
Start: 2024-01-26 | End: 2024-01-26 | Stop reason: HOSPADM

## 2024-01-26 RX ORDER — PROPOFOL 10 MG/ML
VIAL (ML) INTRAVENOUS CONTINUOUS PRN
Status: DISCONTINUED | OUTPATIENT
Start: 2024-01-26 | End: 2024-01-26

## 2024-01-26 RX ORDER — HYDROMORPHONE HYDROCHLORIDE 1 MG/ML
0.2 INJECTION, SOLUTION INTRAMUSCULAR; INTRAVENOUS; SUBCUTANEOUS EVERY 5 MIN PRN
Status: DISCONTINUED | OUTPATIENT
Start: 2024-01-26 | End: 2024-01-26

## 2024-01-26 RX ORDER — ONDANSETRON HYDROCHLORIDE 2 MG/ML
4 INJECTION, SOLUTION INTRAVENOUS ONCE AS NEEDED
Status: COMPLETED | OUTPATIENT
Start: 2024-01-26 | End: 2024-01-26

## 2024-01-26 RX ORDER — LIDOCAINE HYDROCHLORIDE 20 MG/ML
INJECTION INTRAVENOUS
Status: DISCONTINUED | OUTPATIENT
Start: 2024-01-26 | End: 2024-01-26

## 2024-01-26 RX ORDER — PROCHLORPERAZINE EDISYLATE 5 MG/ML
5 INJECTION INTRAMUSCULAR; INTRAVENOUS EVERY 30 MIN PRN
Status: DISCONTINUED | OUTPATIENT
Start: 2024-01-26 | End: 2024-01-26 | Stop reason: HOSPADM

## 2024-01-26 RX ORDER — DROPERIDOL 2.5 MG/ML
0.62 INJECTION, SOLUTION INTRAMUSCULAR; INTRAVENOUS ONCE AS NEEDED
Status: DISCONTINUED | OUTPATIENT
Start: 2024-01-26 | End: 2024-01-26

## 2024-01-26 RX ORDER — SODIUM CHLORIDE 9 MG/ML
INJECTION, SOLUTION INTRAVENOUS ONCE
Status: COMPLETED | OUTPATIENT
Start: 2024-01-26 | End: 2024-01-26

## 2024-01-26 RX ADMIN — HEPARIN SODIUM 1000 UNITS: 1000 INJECTION, SOLUTION INTRAVENOUS; SUBCUTANEOUS at 03:01

## 2024-01-26 RX ADMIN — POLYETHYLENE GLYCOL 3350 17 G: 17 POWDER, FOR SOLUTION ORAL at 08:01

## 2024-01-26 RX ADMIN — VENLAFAXINE 37.5 MG: 37.5 TABLET ORAL at 08:01

## 2024-01-26 RX ADMIN — OXYBUTYNIN CHLORIDE 5 MG: 5 TABLET ORAL at 08:01

## 2024-01-26 RX ADMIN — Medication 10 ML: at 05:01

## 2024-01-26 RX ADMIN — INSULIN ASPART 2 UNITS: 100 INJECTION, SOLUTION INTRAVENOUS; SUBCUTANEOUS at 07:01

## 2024-01-26 RX ADMIN — OXYBUTYNIN CHLORIDE 5 MG: 5 TABLET ORAL at 09:01

## 2024-01-26 RX ADMIN — LIDOCAINE HYDROCHLORIDE 60 MG: 20 INJECTION INTRAVENOUS at 10:01

## 2024-01-26 RX ADMIN — ERYTHROMYCIN ETHYLSUCCINATE 200 MG: 200 SUSPENSION ORAL at 05:01

## 2024-01-26 RX ADMIN — PANTOPRAZOLE SODIUM 40 MG: 40 TABLET, DELAYED RELEASE ORAL at 05:01

## 2024-01-26 RX ADMIN — SODIUM CHLORIDE: 9 INJECTION, SOLUTION INTRAVENOUS at 12:01

## 2024-01-26 RX ADMIN — CEFPODOXIME PROXETIL 200 MG: 200 TABLET, FILM COATED ORAL at 09:01

## 2024-01-26 RX ADMIN — METRONIDAZOLE 500 MG: 500 TABLET ORAL at 09:01

## 2024-01-26 RX ADMIN — METOPROLOL TARTRATE 12.5 MG: 25 TABLET, FILM COATED ORAL at 08:01

## 2024-01-26 RX ADMIN — ONDANSETRON 4 MG: 2 INJECTION INTRAMUSCULAR; INTRAVENOUS at 10:01

## 2024-01-26 RX ADMIN — PROPOFOL 100 MCG/KG/MIN: 10 INJECTION, EMULSION INTRAVENOUS at 10:01

## 2024-01-26 RX ADMIN — LIDOCAINE 5% 1 PATCH: 700 PATCH TOPICAL at 08:01

## 2024-01-26 RX ADMIN — METRONIDAZOLE 500 MG: 500 TABLET ORAL at 08:01

## 2024-01-26 RX ADMIN — DRONABINOL 5 MG: 2.5 CAPSULE ORAL at 08:01

## 2024-01-26 RX ADMIN — SODIUM CHLORIDE: 0.9 INJECTION, SOLUTION INTRAVENOUS at 10:01

## 2024-01-26 RX ADMIN — MIDODRINE HYDROCHLORIDE 10 MG: 5 TABLET ORAL at 05:01

## 2024-01-26 RX ADMIN — METOPROLOL TARTRATE 12.5 MG: 25 TABLET, FILM COATED ORAL at 09:01

## 2024-01-26 RX ADMIN — SENNOSIDES AND DOCUSATE SODIUM 1 TABLET: 8.6; 5 TABLET ORAL at 08:01

## 2024-01-26 RX ADMIN — ERYTHROMYCIN ETHYLSUCCINATE 200 MG: 200 SUSPENSION ORAL at 07:01

## 2024-01-26 RX ADMIN — HYDRALAZINE HYDROCHLORIDE 10 MG: 20 INJECTION, SOLUTION INTRAMUSCULAR; INTRAVENOUS at 10:01

## 2024-01-26 RX ADMIN — PROPOFOL 50 MG: 10 INJECTION, EMULSION INTRAVENOUS at 10:01

## 2024-01-26 RX ADMIN — SUCCINYLCHOLINE CHLORIDE 100 MG: 20 INJECTION, SOLUTION INTRAMUSCULAR; INTRAVENOUS at 10:01

## 2024-01-26 RX ADMIN — DRONABINOL 5 MG: 2.5 CAPSULE ORAL at 09:01

## 2024-01-26 RX ADMIN — MIDODRINE HYDROCHLORIDE 10 MG: 5 TABLET ORAL at 07:01

## 2024-01-26 RX ADMIN — PROPOFOL 80 MG: 10 INJECTION, EMULSION INTRAVENOUS at 10:01

## 2024-01-26 RX ADMIN — ERYTHROMYCIN ETHYLSUCCINATE 200 MG: 200 SUSPENSION ORAL at 10:01

## 2024-01-26 RX ADMIN — FLUCONAZOLE 200 MG: 200 TABLET ORAL at 09:01

## 2024-01-26 RX ADMIN — ERYTHROPOIETIN 4000 UNITS: 4000 INJECTION, SOLUTION INTRAVENOUS; SUBCUTANEOUS at 03:01

## 2024-01-26 NOTE — NURSING
Patient arrived to [facility] via stretcher. Awake and alert.    HD tx initiated via:  Venous line connected to Right subclavian catheter.  Arterial line connected to Left upper arm AV fistula with 15 G needle.

## 2024-01-26 NOTE — TRANSFER OF CARE
"Anesthesia Transfer of Care Note    Patient: Lily Green    Procedure(s) Performed: Procedure(s) (LRB):  EGD (ESOPHAGOGASTRODUODENOSCOPY) (N/A)    Patient location: PACU    Anesthesia Type: general    Transport from OR: Transported from OR on 2-3 L/min O2 by NC with adequate spontaneous ventilation    Post pain: adequate analgesia    Post assessment: no apparent anesthetic complications and tolerated procedure well    Post vital signs: stable    Level of consciousness: lethargic and responds to stimulation    Nausea/Vomiting: no nausea/vomiting    Complications: none    Transfer of care protocol was followed      Last vitals: Visit Vitals  /64 (BP Location: Left arm, Patient Position: Lying)   Pulse 62   Temp 36.8 °C (98.2 °F)   Resp 18   Ht 5' 5" (1.651 m)   Wt 67.6 kg (149 lb)   LMP  (LMP Unknown)   SpO2 99%   Breastfeeding No   BMI 24.79 kg/m²     "

## 2024-01-26 NOTE — PT/OT/SLP PROGRESS
Physical Therapy      Patient Name:  Lily Green   MRN:  [id number]    Patient not seen today secondary to Off the floor for procedure/surgery and  Dialysis. Will follow-up next scheduled treatment per PT POC.

## 2024-01-26 NOTE — PT/OT/SLP PROGRESS
Occupational Therapy      Patient Name:  Lily Green   MRN:  [id number]    Patient not seen today secondary to Off the floor for procedure/surgery. Will follow-up .    1/26/2024

## 2024-01-26 NOTE — ASSESSMENT & PLAN NOTE
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 Hgb 5.5, PTT 80.9, INR 1.3 as of 1/23, now s/p 3 U pRBCs, FFP and protamine without further episodes of bleeding. She remains HDS not requiring pressors. EGD 1/24 with candida. Procedure aborted due to food in the stomach. Large melanotic stool overnight. 2U pRBC.     Recommendations:  - NPO for repeat EGD today

## 2024-01-26 NOTE — PROGRESS NOTES
[facility] Plan of Care Note  Dx Malignant carcinoid tumor of bronchus     Shift Events transferred to [facility] from SICU, Aox4, positioned in bed    Goals of Care: OOB to chair for meals     Neuro: AOx4    Vital Signs: VSS/ hypotensive: hgb resulted 6.5, md made aware     Respiratory: 1 L NC    Diet:gluten free/ renal diet     Is patient tolerating current diet? Yes     GTTS: SL     Urine Output/Bowel Movement: mostly anuric from HD, rectal bm     Drains/Tubes/Tube Feeds (include total output/shift): NA     Lines: NELY midline, right subclavian HD port, TON AV fistula       Accuchecks:ACHS     Skin: see lda     Fall Risk Score: yes     Activity level? Oob x1     Any scheduled procedures? Na     Any safety concerns? Na     Other: na

## 2024-01-26 NOTE — PLAN OF CARE
Admission nurse from ENDO 2nd floor, Caroline BELL, informed me that she received a call from the EP lab regarding the patient's implanted Pacemaker.  Caroline informed me that the EP Lab had no reservations proceeding with the EGD.  Dr. Walters notifited.

## 2024-01-26 NOTE — CARE UPDATE
Care Update:     No acute events overnight. Patient in room 1051/1051 A. Blood glucose stable on current inpatient regimen.No hypoglycemia noted over the past 24-hours. Endocrine will continue to follow and manage insulin orders inpatient.  Day of Surgery    Steroid use- None.   Renal function-   Lab Results   Component Value Date    CREATININE 3.7 (H) 01/26/2024        Diet NPO Except for: Sips with Medication     POCT Glucose   Date Value Ref Range Status   01/26/2024 133 (H) 70 - 110 mg/dL Final   01/25/2024 145 (H) 70 - 110 mg/dL Final   01/25/2024 191 (H) 70 - 110 mg/dL Final   01/25/2024 154 (H) 70 - 110 mg/dL Final   01/25/2024 129 (H) 70 - 110 mg/dL Final   01/24/2024 122 (H) 70 - 110 mg/dL Final   01/24/2024 133 (H) 70 - 110 mg/dL Final   01/24/2024 170 (H) 70 - 110 mg/dL Final   01/24/2024 191 (H) 70 - 110 mg/dL Final   01/23/2024 221 (H) 70 - 110 mg/dL Final   01/23/2024 316 (H) 70 - 110 mg/dL Final   01/23/2024 322 (H) 70 - 110 mg/dL Final   01/23/2024 250 (H) 70 - 110 mg/dL Final     Lab Results   Component Value Date    HGBA1C 4.9 11/15/2023         Home Medications:   LDC SSI   Blood sugar 150 to 200, + 1 unit  Blood sugar 201 to 250, + 2 units  Blood sugar 251 to 300, + 3 units  Blood sugar 301 to 350, + 4 units   Blood sugar greater than 350, + 5 units    Endocrine  Type 2 diabetes mellitus with diabetic polyneuropathy, without long-term current use of insulin  BG goal: 140-180     - Levemir 6 units daily    - Novolog 2 units with meals.   - Novolog Mod dose correction with ISF 25 starting at 150 prn   - POCT Glucose before meals and at bedtime   - Hypoglycemia protocol in place      ** Please notify Endocrine for any change and/or advance in diet**  ** Please call Endocrine for any BG related issues **     Discharge Planning:   TBD. Please notify endocrinology prior to discharge.    Pranav Perry DNP, FNP-C  Department of Endocrinology  Inpatient Glycemic Management

## 2024-01-26 NOTE — SUBJECTIVE & OBJECTIVE
Interval History:   Hgb 6.3 yesterday evening, received 2uPRBC. NPO since midnight.  No other acute events. Stepped down to the floor. 50 cc ss output from remaining R CT. No Bms overnight, although has been having intermittent dark stools.  Spoke with GI - will plan for repeat EGD today.   Feels tired but otherwise no complaints.   For HD today.       Medications:  Continuous Infusions:        Scheduled Meds:   sodium chloride 0.9%   Intravenous Once    cefpodoxime  200 mg Oral QHS    droNABinol  5 mg Oral BID    epoetin noble (PROCRIT) injection  4,000 Units Intravenous Every Mon, Wed, Fri    erythromycin ethylsuccinate  200 mg Oral QID (WM & HS)    fluconazole  200 mg Oral QHS    insulin aspart U-100  2 Units Subcutaneous TIDWM    insulin detemir U-100  6 Units Subcutaneous Daily    LIDOcaine  1 patch Transdermal Q24H    metoprolol tartrate  12.5 mg Oral BID    metroNIDAZOLE  500 mg Oral Q12H    midodrine  10 mg Oral BID WM    oxybutynin  5 mg Oral TID    pantoprazole  40 mg Oral BID AC    polyethylene glycol  17 g Oral Daily    senna-docusate 8.6-50 mg  1 tablet Oral Daily    sodium chloride 0.9%  10 mL Intravenous Q6H    venlafaxine  37.5 mg Oral Daily     PRN Meds:0.9%  NaCl infusion (for blood administration), sodium chloride 0.9%, acetaminophen, albuterol sulfate, bisacodyL, dextrose 10%, dextrose 10%, glucagon (human recombinant), glucose, glucose, heparin (porcine), hydrALAZINE, insulin aspart U-100, midodrine, ondansetron, sodium chloride 0.9%, Flushing PICC/Midline Protocol **AND** sodium chloride 0.9% **AND** sodium chloride 0.9%     Review of patient's allergies indicates:   Allergen Reactions    Crestor [rosuvastatin] Swelling    Gluten protein      Objective:     Vital Signs (Most Recent):  Temp: 97.5 °F (36.4 °C) (01/26/24 0739)  Pulse: 62 (01/26/24 0740)  Resp: 18 (01/26/24 0739)  BP: (!) 108/53 (01/26/24 0739)  SpO2: (!) 94 % (01/26/24 0739) Vital Signs (24h Range):  Temp:  [97.3 °F (36.3 °C)-98.4  °F (36.9 °C)] 97.5 °F (36.4 °C)  Pulse:  [61-79] 62  Resp:  [11-20] 18  SpO2:  [94 %-100 %] 94 %  BP: ()/(46-82) 108/53     Intake/Output - Last 3 Shifts         01/24 0700  01/25 0659 01/25 0700  01/26 0659 01/26 0700 01/27 0659    P.O. 350 717     I.V. (mL/kg)  150 (2.2)     Blood  972.9     IV Piggyback 50      Total Intake(mL/kg) 400 (5.9) 1839.9 (27.2)     Urine (mL/kg/hr)  0 (0)     Other 1600      Stool 0 0     Chest Tube 280 60     Total Output 1880 60     Net -1480 +1779.9            Stool Occurrence 1 x 2 x             SpO2: (!) 94 %        Physical Exam  Vitals and nursing note reviewed.   Constitutional:       Appearance: Normal appearance.   HENT:      Head: Normocephalic and atraumatic.      Nose: Nose normal.   Cardiovascular:      Rate and Rhythm: Normal rate and regular rhythm.      Pulses: Normal pulses.   Pulmonary:      Effort: Pulmonary effort is normal. No respiratory distress.      Comments: Right posterior chest incision c/d/I    Right chest permacath    Right Chest tube, 10 fr, serosanguinous output, waterseal   Abdominal:      General: Abdomen is flat. There is no distension.      Palpations: Abdomen is soft.      Tenderness: There is no abdominal tenderness.      Comments: Soft, nontender abdomen   Musculoskeletal:      Right lower leg: Edema present.      Left lower leg: Edema present.   Skin:     General: Skin is warm and dry.      Coloration: Skin is pale.      Comments: Groin soft, dressings c/d/i   Neurological:      General: No focal deficit present.      Mental Status: She is alert.            Significant Labs:  CBC:   Recent Labs   Lab 01/26/24  0421   WBC 11.23   RBC 3.32*   HGB 9.6*   HCT 29.2*      MCV 88   MCH 28.9   MCHC 32.9         Significant Diagnostics:  I have reviewed and interpreted all pertinent imaging results/findings within the past 24 hours.    VTE Risk Mitigation (From admission, onward)           Ordered     IP VTE HIGH RISK PATIENT  Once          01/23/24 1527     heparin 25,000 units in dextrose 5% 250 mL (100 units/mL) infusion LOW INTENSITY nomogram - [facility]  Continuous        Question:  Begin at (units/kg/hr)  Answer:  16    01/15/24 1038     heparin (porcine) injection 1,000 Units  As needed (PRN)         01/11/24 1351     heparin (porcine) injection 1,000 Units  As needed (PRN)         01/03/24 1123     Place CARMELITA hose  Until discontinued         01/02/24 1226     Place sequential compression device  Until discontinued         01/02/24 1226

## 2024-01-26 NOTE — NURSING
[facility] Plan of Care Note    Dx:  Malignant carcinoid tumor of bronchus      Shift Events: transfused 2 units PRBC's. Diet changed to NPO except sips with meds per Surgery.     Goals of Care: OOB to chair for meals      Neuro: AOx4     Vital Signs: VSS/ hypotensive:       Respiratory: 1 L NC     Diet: NPO     Is patient tolerating current diet? Yes      GTTS: SL      Urine Output/Bowel Movement: mostly anuric from HD. No BM this shift.     Drains/Tubes/Tube Feeds (include total output/shift): NA      Lines: NELY midline, right subclavian HD port, TON AV fistula         Accuchecks: ACHS      Skin: see lda      Fall Risk Score: yes      Activity level? Oob x 2      Any scheduled procedures? Na      Any safety concerns? Na      Other: na

## 2024-01-26 NOTE — ANESTHESIA PROCEDURE NOTES
Intubation    Date/Time: 1/26/2024 10:21 AM    Performed by: Miguelina Negro CRNA  Authorized by: Wayne Carrion MD    Intubation:     Induction:  Rapid sequence induction    Intubated:  Postinduction    Mask Ventilation:  Not attempted    Attempts:  1    Attempted By:  CRNA    Method of Intubation:  Video laryngoscopy    Blade:  [place] 3    Laryngeal View Grade: Grade I - full view of cords      Difficult Airway Encountered?: No      Complications:  None    Airway Device:  Oral endotracheal tube    Airway Device Size:  7.0    Style/Cuff Inflation:  Cuffed (inflated to minimal occlusive pressure)    Tube secured:  22    Secured at:  The teeth    Placement Verified By:  Capnometry    Complicating Factors:  None    Findings Post-Intubation:  BS equal bilateral and atraumatic/condition of teeth unchanged

## 2024-01-26 NOTE — CONSULTS
Inpatient consult to Physical Medicine Rehab  Consult performed by: Aleisha Go NP  Consult ordered by: Kat Flores MD      Consult received.  Reviewed patient history and current admission.  PM&R following.    Aleisha Go NP  Physical Medicine & Rehabilitation   01/26/2024

## 2024-01-26 NOTE — ASSESSMENT & PLAN NOTE
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 Stepped up to the ICU 1/5 for afib RVR which was refractory to medical management and c/b development of tachy-eulogio syndrome now s/p micra placement 1/16 for tachy-eulogio syndrome in the setting of RVR. Did have brief SBO type picture 1/7 which resolved with conservative management.     - s/p R thoracotomy with R lower lobectomy for carcinoid w/ MLND; IPV injury requiring repair. CT removed post operatively.  - Refractory afib RVR requiring ICU step up 1/05, s/p chemical cardioversion initially with recurrence c/b developing tachy-eulogio syndrome    - cards and EP following, appreciate assistance    - AC indicated 2/2 intermittent atrial fib episodes and chadsvasc score of 2     - s/p TVP placement and now micra implantation 1/16    - Heparin gtt resumed 1/22; will need to transition to eliquis likely following CT removal; held 1/23 2/2 GI bleed   - CT chest 1/13 w/ bilateral pleural effusion (R>L), pericardial effusion,   - s/p R chest tube placement with IR 1/17, L chest tube placement 1/18; L CT removed 1/25; likely DC remaining R CT today after HD   - Daily CXR while CT are in place   - Pulmonary toilet: IS, Acapella, Mucomyst daily   - Infectious work up 2/2 leukocytosis. She remains afebrile. Effusions and compressive atelectasis also likely contributing to leukocytosis.Trended up to 30k 1/16, now downtrending. Zosyn 1/11-1/15; Meropenam 1/16 - 1/20; Plan for Cefpodoxime and metronidazole to start 1/21 per ID. 4 week abx plan for empiric treatment of presumed parapneumonic effusion - end date 2/15/2024.    - ID following, appreciate assistance. Reconsult following thora results.   - Bcx 1/11: Staph, probable contaminant  - Bcx1/12: No growth  - Rapid blood PCR: staph epi  -  Sputum Cx 1/11: klebsiella & e. Coli  - R pleural fluid Cx 1/17: No growth (fungal pending still, neg KOH)  - SBO type episode 1/07 requiring NGT decompression, which resolved with conservative management   - GI bleed 1/23/2024 - heparin held, 2uPRBC, 1uFFP, heparin reversed. GI consulted    - EGD 1/24 - could not visualize stomach much 2/2 food residue; EGD aborted    - 2u PRBC overnight 1/25 for Hgb 6.3, slow downtrend. Repeat EGD 1/26  - Renal, gluten free diet, novasource renal BID; marinol for appetite   - Bowel regimen, PPI  - Nephrology following, appreciate assistance. CRRT 1/17 overnight. HD trial 1/19 passed but requiring midodrine, 1.5 L LR, metoprolol for RVR. HD 1/26   - LUE AVF created 10.2023, US 1/22/2023 with good flow metrics, okay to use per vascular   - Oxybutinin for bladder spasms   - MM pain control as needed  - Continue PT/OT, mobilization, likely post hospital placement         Dispo: Okay for step down today

## 2024-01-26 NOTE — NURSING
3 hours HD tx completed. 1 L of fluid removed.   Patient tolerated well.     Blood returned. Lines flushed.     Catheter locked with Heparin. Clamped, capped and wrapped with sterile gauze.     Needle pulled from arterial end of fistula. Manual pressure held until hemostasis was achieved.     Report given to primary nurse.

## 2024-01-26 NOTE — PROVATION PATIENT INSTRUCTIONS
Discharge Summary/Instructions after an Endoscopic Procedure  Patient Name: Lily Green  Patient MRN: [id number]  Patient YOB: 1950 Friday, January 26, 2024  Yamilet Walters MD  Dear patient,  As a result of recent federal legislation (The Federal Cures Act), you may   receive lab or pathology results from your procedure in your [facility]   account before your physician is able to contact you. Your physician or   their representative will relay the results to you with their   recommendations at their soonest availability.  Thank you,  RESTRICTIONS:  During your procedure today, you received medications for sedation.  These   medications may affect your judgment, balance and coordination.  Therefore,   for 24 hours, you have the following restrictions:   - DO NOT drive a car, operate machinery, make legal/financial decisions,   sign important papers or drink alcohol.    ACTIVITY:  Today: no heavy lifting, straining or running due to procedural   sedation/anesthesia.  The following day: return to full activity including work.  DIET:  Eat and drink normally unless instructed otherwise.     TREATMENT FOR COMMON SIDE EFFECTS:  - Mild abdominal pain, nausea, belching, bloating or excessive gas:  rest,   eat lightly and use a heating pad.  - Sore Throat: treat with throat lozenges and/or gargle with warm salt   water.  - Because air was used during the procedure, expelling large amounts of air   from your rectum or belching is normal.  - If a bowel prep was taken, you may not have a bowel movement for 1-3 days.    This is normal.  SYMPTOMS TO WATCH FOR AND REPORT TO YOUR PHYSICIAN:  1. Abdominal pain or bloating, other than gas cramps.  2. Chest pain.  3. Back pain.  4. Signs of infection such as: chills or fever occurring within 24 hours   after the procedure.  5. Rectal bleeding, which would show as bright red, maroon, or black stools.   (A tablespoon of blood from the rectum is not serious, especially  if   hemorrhoids are present.)  6. Vomiting.  7. Weakness or dizziness.  GO DIRECTLY TO THE NEAREST EMERGENCY ROOM IF YOU HAVE ANY OF THE FOLLOWING:      Difficulty breathing              Chills and/or fever over 101 F   Persistent vomiting and/or vomiting blood   Severe abdominal pain   Severe chest pain   Black, tarry stools   Bleeding- more than one tablespoon   Any other symptom or condition that you feel may need urgent attention  Your doctor recommends these additional instructions:  If any biopsies were taken, your doctors clinic will contact you in 1 to 2   weeks with any results.  - Return patient to hospital senior for ongoing care.   - Resume previous diet.   - Use Prilosec (omeprazole) 40 mg PO BID for 8 weeks.   - Perform an H. pylori stool antigen (HpSA) test today.   - Repeat upper endoscopy in 8 weeks to check healing.   - Recommend a motility agent.  For questions, problems or results please call your physician - Yamilet Walters MD at Work:  [phone].  [facility] [place] [facility], EMERGENCY ROOM PHONE NUMBER: [phone]  IF A COMPLICATION OR EMERGENCY SITUATION ARISES AND YOU ARE UNABLE TO REACH   YOUR PHYSICIAN - GO DIRECTLY TO THE EMERGENCY ROOM.  Yamilet Walters MD  1/26/2024 10:42:56 AM  This report has been verified and signed electronically.  Dear patient,  As a result of recent federal legislation (The Federal Cures Act), you may   receive lab or pathology results from your procedure in your [facility]   account before your physician is able to contact you. Your physician or   their representative will relay the results to you with their   recommendations at their soonest availability.  Thank you,  PROVATION

## 2024-01-26 NOTE — PROGRESS NOTES
[facility]  Thoracic Surgery  Progress Note    Subjective:     History of Present Illness:  No notes on file    Post-Op Info:  Procedure(s) (LRB):  EGD (ESOPHAGOGASTRODUODENOSCOPY) (N/A)   2 Days Post-Op     Interval History:   Hgb 6.3 yesterday evening, received 2uPRBC. NPO since midnight.  No other acute events. Stepped down to the floor. 50 cc ss output from remaining R CT. No Bms overnight, although has been having intermittent dark stools.  Spoke with GI - will plan for repeat EGD today.   Feels tired but otherwise no complaints.   For HD today.       Medications:  Continuous Infusions:        Scheduled Meds:   sodium chloride 0.9%   Intravenous Once    cefpodoxime  200 mg Oral QHS    droNABinol  5 mg Oral BID    epoetin noble (PROCRIT) injection  4,000 Units Intravenous Every Mon, Wed, Fri    erythromycin ethylsuccinate  200 mg Oral QID (WM & HS)    fluconazole  200 mg Oral QHS    insulin aspart U-100  2 Units Subcutaneous TIDWM    insulin detemir U-100  6 Units Subcutaneous Daily    LIDOcaine  1 patch Transdermal Q24H    metoprolol tartrate  12.5 mg Oral BID    metroNIDAZOLE  500 mg Oral Q12H    midodrine  10 mg Oral BID WM    oxybutynin  5 mg Oral TID    pantoprazole  40 mg Oral BID AC    polyethylene glycol  17 g Oral Daily    senna-docusate 8.6-50 mg  1 tablet Oral Daily    sodium chloride 0.9%  10 mL Intravenous Q6H    venlafaxine  37.5 mg Oral Daily     PRN Meds:0.9%  NaCl infusion (for blood administration), sodium chloride 0.9%, acetaminophen, albuterol sulfate, bisacodyL, dextrose 10%, dextrose 10%, glucagon (human recombinant), glucose, glucose, heparin (porcine), hydrALAZINE, insulin aspart U-100, midodrine, ondansetron, sodium chloride 0.9%, Flushing PICC/Midline Protocol **AND** sodium chloride 0.9% **AND** sodium chloride 0.9%     Review of patient's allergies indicates:   Allergen Reactions    Crestor [rosuvastatin] Swelling    Gluten protein      Objective:     Vital Signs (Most  Recent):  Temp: 97.5 °F (36.4 °C) (01/26/24 0739)  Pulse: 62 (01/26/24 0740)  Resp: 18 (01/26/24 0739)  BP: (!) 108/53 (01/26/24 0739)  SpO2: (!) 94 % (01/26/24 0739) Vital Signs (24h Range):  Temp:  [97.3 °F (36.3 °C)-98.4 °F (36.9 °C)] 97.5 °F (36.4 °C)  Pulse:  [61-79] 62  Resp:  [11-20] 18  SpO2:  [94 %-100 %] 94 %  BP: ()/(46-82) 108/53     Intake/Output - Last 3 Shifts         01/24 0700  01/25 0659 01/25 0700 01/26 0659 01/26 0700 01/27 0659    P.O. 350 717     I.V. (mL/kg)  150 (2.2)     Blood  972.9     IV Piggyback 50      Total Intake(mL/kg) 400 (5.9) 1839.9 (27.2)     Urine (mL/kg/hr)  0 (0)     Other 1600      Stool 0 0     Chest Tube 280 60     Total Output 1880 60     Net -1480 +1779.9            Stool Occurrence 1 x 2 x             SpO2: (!) 94 %       Physical Exam  Vitals and nursing note reviewed.   Constitutional:       Appearance: Normal appearance.   HENT:      Head: Normocephalic and atraumatic.      Nose: Nose normal.   Cardiovascular:      Rate and Rhythm: Normal rate and regular rhythm.      Pulses: Normal pulses.   Pulmonary:      Effort: Pulmonary effort is normal. No respiratory distress.      Comments: Right posterior chest incision c/d/I    Right chest permacath    Right Chest tube, 10 fr, serosanguinous output, waterseal   Abdominal:      General: Abdomen is flat. There is no distension.      Palpations: Abdomen is soft.      Tenderness: There is no abdominal tenderness.      Comments: Soft, nontender abdomen   Musculoskeletal:      Right lower leg: Edema present.      Left lower leg: Edema present.   Skin:     General: Skin is warm and dry.      Coloration: Skin is pale.      Comments: Groin soft, dressings c/d/i   Neurological:      General: No focal deficit present.      Mental Status: She is alert.            Significant Labs:  CBC:   Recent Labs   Lab 01/26/24  0421   WBC 11.23   RBC 3.32*   HGB 9.6*   HCT 29.2*      MCV 88   MCH 28.9   MCHC 32.9          Significant Diagnostics:  I have reviewed and interpreted all pertinent imaging results/findings within the past 24 hours.    VTE Risk Mitigation (From admission, onward)           Ordered     IP VTE HIGH RISK PATIENT  Once         01/23/24 1527     heparin 25,000 units in dextrose 5% 250 mL (100 units/mL) infusion LOW INTENSITY nomogram - [facility]  Continuous        Question:  Begin at (units/kg/hr)  Answer:  16    01/15/24 1038     heparin (porcine) injection 1,000 Units  As needed (PRN)         01/11/24 1351     heparin (porcine) injection 1,000 Units  As needed (PRN)         01/03/24 1123     Place CARMELITA hose  Until discontinued         01/02/24 1226     Place sequential compression device  Until discontinued         01/02/24 1226                  Assessment/Plan:     * Malignant carcinoid tumor of bronchus  72 yo female with ESRD (TTS), DDD, benign essential tremor, meniere's disease, HTN, HLD, DM2, GERD, Celiac's, IBS and 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 Stepped up to the ICU 1/5 for afib RVR which was refractory to medical management and c/b development of tachy-eulogio syndrome now s/p micra placement 1/16 for tachy-eulogio syndrome in the setting of RVR. Did have brief SBO type picture 1/7 which resolved with conservative management.     - s/p R thoracotomy with R lower lobectomy for carcinoid w/ MLND; IPV injury requiring repair. CT removed post operatively.  - Refractory afib RVR requiring ICU step up 1/05, s/p chemical cardioversion initially with recurrence c/b developing tachy-eulogio syndrome    - cards and EP following, appreciate assistance    - AC indicated 2/2 intermittent atrial fib episodes and chadsvasc score of 2     - s/p TVP placement and now micra implantation 1/16    - Heparin gtt resumed 1/22; will need to transition to eliquis likely following CT removal; held 1/23 2/2 GI bleed   - CT chest 1/13 w/  bilateral pleural effusion (R>L), pericardial effusion,   - s/p R chest tube placement with IR 1/17, L chest tube placement 1/18; L CT removed 1/25; likely DC remaining R CT today after HD   - Daily CXR while CT are in place   - Pulmonary toilet: IS, Acapella, Mucomyst daily   - Infectious work up 2/2 leukocytosis. She remains afebrile. Effusions and compressive atelectasis also likely contributing to leukocytosis.Trended up to 30k 1/16, now downtrending. Zosyn 1/11-1/15; Meropenam 1/16 - 1/20; Plan for Cefpodoxime and metronidazole to start 1/21 per ID. 4 week abx plan for empiric treatment of presumed parapneumonic effusion - end date 2/15/2024.    - ID following, appreciate assistance. Reconsult following thora results.   - Bcx 1/11: Staph, probable contaminant  - Bcx1/12: No growth  - Rapid blood PCR: staph epi  - Sputum Cx 1/11: klebsiella & e. Coli  - R pleural fluid Cx 1/17: No growth (fungal pending still, neg KOH)  - SBO type episode 1/07 requiring NGT decompression, which resolved with conservative management   - GI bleed 1/23/2024 - heparin held, 2uPRBC, 1uFFP, heparin reversed. GI consulted    - EGD 1/24 - could not visualize stomach much 2/2 food residue; EGD aborted    - 2u PRBC overnight 1/25 for Hgb 6.3, slow downtrend. Repeat EGD 1/26  - Renal, gluten free diet, novasource renal BID; marinol for appetite   - Bowel regimen, PPI  - Nephrology following, appreciate assistance. CRRT 1/17 overnight. HD trial 1/19 passed but requiring midodrine, 1.5 L LR, metoprolol for RVR. HD 1/26   - LUE AVF created 10.2023, US 1/22/2023 with good flow metrics, okay to use per vascular   - Oxybutinin for bladder spasms   - MM pain control as needed  - Continue PT/OT, mobilization, likely post hospital placement         Dispo: Okay for step down today         Kat Flores MD  Thoracic Surgery  [facility]

## 2024-01-26 NOTE — PROGRESS NOTES
[facility] HEMODIALYSIS NOTE     Patient currently on hemodialysis for removal of uremic toxins and volume.     Patient seen and evaluated on hemodialysis, tolerating treatment, see HD flowsheet for vitals and assessments.    Labs have been reviewed and the dialysate bath has been adjusted.       Assessment/Plan:    -S/p EGD. Aborted due to food in stomach. Melana overnight requiring 2 units pRBC.   -Patient seen on HD, tolerating treatment well, w/o complaints   -UF goal of 1-2L  -Renal diet, if not NPO   -Strict I/O's and daily weights  -Daily renal function panels  -Keep MAP >65 while on HD   -Hgb goal 10-11  -Continue midodrine   -Will continue to follow while inpatient     Christy Gaytan DNP-FNP, C  Nephrology  Pager: [phone]

## 2024-01-26 NOTE — NURSING TRANSFER
Nursing Transfer Note      1/26/2024   12:21 PM    Pt transported to dialysis on stretcher c remote cardiac monitor and pt's glasses.  Denies pain, sob, n/v.  Report given to receiving floor RN, dialysis aware and ready for pts arrival.

## 2024-01-26 NOTE — PLAN OF CARE
Problem: Diabetes Comorbidity  Goal: Blood Glucose Level Within Targeted Range  Outcome: Ongoing, Progressing  Intervention: Monitor and Manage Glycemia  Flowsheets (Taken 1/26/2024 0106)  Glycemic Management:   blood glucose monitored   carbohydrate replacement provided

## 2024-01-26 NOTE — H&P
Short Stay Endoscopy History and Physical    PCP - Pavel Calixto MD  Referring Physician - Tan Thomson MD  [address] [place] [address]    Procedure - Endoscopy  ASA - per anesthesia  Mallampati - per anesthesia  History of Anesthesia problems - no  Family history Anesthesia problems -  no   Plan of anesthesia - General    HPI  73 y.o. female  Reason for procedure:   Melena, anemia    ROS:  Constitutional: No fevers, chills, No weight loss  CV: No chest pain  Pulm: No cough, No shortness of breath  GI: see HPI    Medical History:  has a past medical history of Anxiety, Celiac disease (05/2018), Celiac disease, Depression, Diabetes mellitus, Family history of breast cancer in mother, Hyperlipidemia, Hypertension, Meniere disease, Meniere's disease, unspecified ear, Menopause, Peptic ulcer, Reflux esophagitis, Urinary tract infection, Vaginal infection, and Vaginal Pap smear (04/07/2014).    Surgical History:  has a past surgical history that includes Tonsillectomy; Appendectomy; Dilation and curettage of uterus (1986); Hysterectomy (1987); Carpal tunnel release (Bilateral, 09/2017); Shoulder surgery (2009 & 2010); Colonoscopy (04/2018); Upper gastrointestinal endoscopy (04/2018); Dupuytren contracture release (Bilateral, 09/2017); Breast surgery; Injection of neurolytic agent around lumbar sympathetic nerve (N/A, 1/6/2022); Injection of neurolytic agent around lumbar sympathetic nerve (N/A, 8/25/2022); laparotomy, exploratory (N/A, 1/14/2023); laparotomy, exploratory (N/A, 1/16/2023); closure, colostomy (Left, 1/16/2023); Colostomy (Right, 1/16/2023); Insertion of tunneled central venous hemodialysis catheter (Right, 1/25/2023); removal, tunneled cath (Right, 1/25/2023); insertion, catheter, tunneled (Left, 1/28/2023); Removal of catheter (Right, 1/28/2023); robotic bronchoscopy (N/A, 10/20/2023); lymphadenectomy (Right, 1/2/2024); thoracotomy (Right, 1/2/2024); thoracotomy, with initial  therapeutic wedge resection of lung (Right, 1/2/2024); pacemaker, temporary, transvenous, pediatric (Right, 1/12/2024); Implantation of leadless pacemaker (N/A, 1/16/2024); and Esophagogastroduodenoscopy (N/A, 1/24/2024).    Family History: family history includes Arthritis in her father; Breast cancer in her mother and paternal aunt; Cancer in her mother and paternal aunt; Diabetes in her paternal grandfather; Hyperlipidemia in her sister; Vision loss in her father, mother, and sister..    Social History:  reports that she has never smoked. She has never used smokeless tobacco. She reports that she does not drink alcohol and does not use drugs.    Review of patient's allergies indicates:   Allergen Reactions    Crestor [rosuvastatin] Swelling    Gluten protein        Medications:   Facility-Administered Medications Prior to Admission   Medication Dose Route Frequency Provider Last Rate Last Admin    capsaicin-skin cleanser patch 1 patch  1 patch Topical (Top) 1 time in Clinic/HOD Asad Verduzco MD        capsaicin-skin cleanser patch 2 patch  2 patch Topical (Top) 1 time in Clinic/HOD Asad Verduzco MD         Medications Prior to Admission   Medication Sig Dispense Refill Last Dose    atorvastatin (LIPITOR) 40 MG tablet Take 1 tablet (40 mg total) by mouth once daily. (Patient taking differently: Take 40 mg by mouth every evening.) 90 tablet 3 1/1/2024    epoetin noble-epbx (RETACRIT) 10,000 unit/mL imjection Inject 1 mL (10,000 Units total) into the skin every Mon, Wed, Fri. HOLD IF HEMOGLOBIN is 10 or GREATER    DO NOT SHAKE  DO NOT DILUTE  DO NOT MIX with other drug solutions 12 mL 0 1/1/2024 at 0900    ferrous gluconate (FERGON) 324 MG tablet Take 1 tablet (324 mg total) by mouth daily with breakfast. 30 tablet 3 1/1/2024 at 2200    meclizine (ANTIVERT) 25 mg tablet Take 1 tablet (25 mg total) by mouth 3 (three) times daily as needed for Dizziness. 90 tablet 3 1/1/2024 at 0800    melatonin (MELATIN) 3 mg  tablet Take 3 tablets (9 mg total) by mouth nightly as needed for Insomnia. 90 tablet 3 1/1/2024 at 2100    midodrine (PROAMATINE) 5 MG Tab Take 1 tablet (5 mg total) by mouth 2 (two) times daily as needed (if BP is systolic below 90 mmHg and patient is symptomatic. Also if BP is below 90 mmHg during dialysis). 60 tablet 3 1/2/2024    pantoprazole (PROTONIX) 40 MG tablet Take 1 tablet (40 mg total) by mouth once daily. 90 tablet 3 1/1/2024 at 0800    patiromer calcium sorbitex (VELTASSA) 8.4 gram PwPk Take 2 packets (16.8 g total) by mouth once daily. 180 packet 2 1/1/2024 at 2100    sevelamer HCL (RENAGEL) 800 MG Tab Take 2 tablets (1,600 mg total) by mouth 3 (three) times daily with meals. 180 tablet 11 1/1/2024 at 1800    torsemide 40 mg Tab Take 40 mg by mouth once daily. 30 tablet 3 1/1/2024 at 2100    B complex-vitamin C-folic acid (MELLISSA-BENJY) 0.8 mg Tab Take 1 tablet (0.8 mg total) by mouth once daily. 30 tablet 3 12/28/2023 at 0800    betahistine HCl (BETAHISTINE, BULK, MISC)    Unknown    blood sugar diagnostic (TRUE METRIX GLUCOSE TEST STRIP) Strp USE ONE STRIP FOR TESTING 2 TIMES A  each 11     blood-glucose meter kit Use to test twice a day 1 each 0     calcium-vitamin D3 (OS-CIPRIANO 500 + D3) 500 mg-5 mcg (200 unit) per tablet Take 1 tablet by mouth once daily. 30 tablet 3 12/28/2023 at 0800    coenzyme Q10 100 mg capsule    More than a month at 0800    dicyclomine (BENTYL) 10 MG capsule    Unknown    dronabinoL (MARINOL) 5 MG capsule Take 1 capsule (5 mg total) by mouth 2 (two) times daily before meals. 60 capsule 1 Unknown    econazole nitrate 1 % cream Apply topically 2 (two) times daily. 30 g 2 Unknown    insulin aspart U-100 (NOVOLOG) 100 unit/mL (3 mL) InPn pen Inject before meals:  150-200=+1, 201-250=+2, 251-300=+3; 301-350=+4, over 350=+5 units 15 mL 0 12/31/2023 at 1800    lancets Misc 1 lancet by Misc.(Non-Drug; Combo Route) route 4 (four) times daily. 400 each 3     mometasone (ELOCON)  "0.1 % ointment Apply topically.   Unknown    neomycin (MYCIFRADIN) 500 mg Tab    Unknown    nutritional supplements Liqd Take 237 mLs by mouth 4 (four) times daily. 120 each 6 More than a month    oxyCODONE (ROXICODONE) 5 MG immediate release tablet Take 5 mg by mouth every 4 (four) hours as needed.   More than a month    pen needle, diabetic (BD ULTRA-FINE MARIS PEN NEEDLE) 32 gauge x 5/32" Ndle 1 Device by Misc.(Non-Drug; Combo Route) route 4 (four) times daily with meals and nightly. 400 each 3     pregabalin (LYRICA) 150 MG capsule TAKE ONE CAPSULE BY MOUTH 2 TIMES A DAY 60 capsule 2 Unknown    pregabalin (LYRICA) 75 MG capsule Take 1 capsule (75 mg total) by mouth Daily. Give after HD 30 capsule 0 Unknown    simethicone (MYLICON) 125 mg Cap capsule Take by mouth.   Unknown    vit C,A-Om-sfvfr-lutein-zeaxan (PRESERVISION AREDS 2) 097-763-34-1 mg-unit-mg-mg Cap    Unknown    vitamins  A,C,E-zinc-copper [phone] unit-mg-unit Cap Take 1 tablet by mouth once daily.   Unknown       Physical Exam:    Vital Signs:   Vitals:    01/26/24 0740   BP:    Pulse: 62   Resp:    Temp:        General Appearance: Well appearing in no acute distress  Abdomen: Soft, non tender, non distended with normal bowel sounds, no masses    Labs:  Lab Results   Component Value Date    WBC 11.23 01/26/2024    HGB 9.6 (L) 01/26/2024    HCT 29.2 (L) 01/26/2024     01/26/2024    CHOL 150 11/15/2023    TRIG 201 (H) 11/15/2023    HDL 45 11/15/2023    ALT 5 (L) 01/26/2024    AST 13 01/26/2024     (L) 01/26/2024    K 4.6 01/26/2024     01/26/2024    CREATININE 3.7 (H) 01/26/2024    BUN 63 (H) 01/26/2024    CO2 25 01/26/2024    TSH 1.821 01/13/2023    INR 1.3 (H) 01/23/2024    GLUF 86 07/11/2023    HGBA1C 4.9 11/15/2023       I have explained the risks and benefits of this endoscopic procedure to the patient including but not limited to bleeding, inflammation, infection, perforation, and death.      Amanuel Denny MD    "

## 2024-01-26 NOTE — SUBJECTIVE & OBJECTIVE
Subjective:     Interval History: Followed up with patient s/p EGD 11/24. Likely candida esophagitis noted as well as food in stomach so procedure was aborted. Notified last night of patients large melanotic stool requiring 2U pRBC.    Review of Systems   Constitutional:  Positive for appetite change. Negative for activity change.   HENT:  Positive for trouble swallowing. Negative for sore throat.    Gastrointestinal:  Positive for blood in stool. Negative for abdominal distention, abdominal pain, anal bleeding, constipation, diarrhea, nausea, rectal pain and vomiting.   Skin:  Negative for color change and pallor.   Neurological:  Positive for weakness. Negative for dizziness.     Objective:     Vital Signs (Most Recent):  Temp: 97.5 °F (36.4 °C) (01/26/24 0739)  Pulse: 62 (01/26/24 0740)  Resp: 18 (01/26/24 0739)  BP: (!) 108/53 (01/26/24 0739)  SpO2: (!) 94 % (01/26/24 0739) Vital Signs (24h Range):  Temp:  [97.3 °F (36.3 °C)-98.4 °F (36.9 °C)] 97.5 °F (36.4 °C)  Pulse:  [61-78] 62  Resp:  [11-20] 18  SpO2:  [94 %-100 %] 94 %  BP: ()/(46-82) 108/53     Weight: 67.6 kg (149 lb) (01/25/24 0600)  Body mass index is 24.79 kg/m².      Intake/Output Summary (Last 24 hours) at 1/26/2024 0916  Last data filed at 1/26/2024 0545  Gross per 24 hour   Intake 1617.92 ml   Output 40 ml   Net 1577.92 ml         Lines/Drains/Airways       Central Venous Catheter Line  Duration                  Hemodialysis Catheter 01/25/23 1501 right internal jugular 365 days              Drain  Duration                  Chest Tube 01/17/24 1504 Right Pleural 10 Fr. 8 days              Peripheral Intravenous Line  Duration                  Hemodialysis AV Fistula 09/01/23 0800 Left forearm 147 days         Midline Catheter Insertion/Assessment  - Single Lumen 01/22/24 1707 Right basilic vein (medial side of arm) 18g x 10cm 3 days                     Physical Exam  Vitals reviewed.   Constitutional:       Appearance: She is normal weight.  She is not ill-appearing.   HENT:      Mouth/Throat:      Mouth: Mucous membranes are moist.      Pharynx: Oropharynx is clear.   Eyes:      General: No scleral icterus.  Abdominal:      General: Bowel sounds are normal. There is no distension.      Palpations: Abdomen is soft. There is no mass.      Tenderness: There is no abdominal tenderness.   Skin:     General: Skin is warm and dry.      Capillary Refill: Capillary refill takes less than 2 seconds.      Coloration: Skin is not jaundiced or pale.   Neurological:      Mental Status: She is alert and oriented to person, place, and time. Mental status is at baseline.          Significant Labs:  CBC:   Recent Labs   Lab 01/25/24  0927 01/25/24  1715 01/26/24  0421   WBC 14.79* 10.09 11.23   HGB 7.6* 6.3* 9.6*   HCT 23.8* 20.7* 29.2*    78* 184       CMP:   Recent Labs   Lab 01/26/24  0421   *   CALCIUM 8.5*   ALBUMIN 2.0*   PROT 4.6*   *   K 4.6   CO2 25      BUN 63*   CREATININE 3.7*   ALKPHOS 84   ALT 5*   AST 13   BILITOT 1.1*           Significant Imaging:  Imaging results within the past 24 hours have been reviewed.

## 2024-01-26 NOTE — ANESTHESIA PREPROCEDURE EVALUATION
01/26/2024  Lily Green is a 73 y.o., female.    Pre-operative evaluation for Procedure(s) (LRB):  EGD (ESOPHAGOGASTRODUODENOSCOPY) (N/A)    Lily Green is a 73 y.o. female     Patient Active Problem List   Diagnosis    Type 2 diabetes mellitus with diabetic polyneuropathy, without long-term current use of insulin    Hyperlipidemia, mixed    GERD without esophagitis    Mild recurrent major depression    Primary insomnia    Chronic neck pain    DDD (degenerative disc disease), cervical    Screening mammogram, encounter for    Celiac disease    Hand arthritis    Benign essential tremor    Chronic pain of both knees    Irritable bowel syndrome    TANIA (stress urinary incontinence, female)    Meniere's disease of both ears    Ankle weakness    Psoriasis    Idiopathic neuropathy    Bilateral foot pain    Generalized anxiety disorder    Cognitive complaints    Peritonitis    Large bowel ischemia    Elevated lactic acid level    Hyponatremia    Primary hypertension    On supplemental oxygen by nasal cannula    Anemia    Hypophosphatemia    Debility    Weakness    Lung nodule    Malignant carcinoid tumor of bronchus    ESRD (end stage renal disease)    Atrial fibrillation    Anticoagulated on heparin    Anuria    A-V fistula    Bradycardia    Parapneumonic effusion    Carcinoid tumor of lung    GIB (gastrointestinal bleeding)    Syncope       Review of patient's allergies indicates:   Allergen Reactions    Crestor [rosuvastatin] Swelling    Gluten protein        No current facility-administered medications on file prior to encounter.     Current Outpatient Medications on File Prior to Encounter   Medication Sig Dispense Refill    atorvastatin (LIPITOR) 40 MG tablet Take 1 tablet (40 mg total) by mouth once daily. (Patient taking differently: Take 40 mg by mouth every evening.) 90 tablet 3     epoetin noble-epbx (RETACRIT) 10,000 unit/mL imjection Inject 1 mL (10,000 Units total) into the skin every Mon, Wed, Fri. HOLD IF HEMOGLOBIN is 10 or GREATER    DO NOT SHAKE  DO NOT DILUTE  DO NOT MIX with other drug solutions 12 mL 0    ferrous gluconate (FERGON) 324 MG tablet Take 1 tablet (324 mg total) by mouth daily with breakfast. 30 tablet 3    meclizine (ANTIVERT) 25 mg tablet Take 1 tablet (25 mg total) by mouth 3 (three) times daily as needed for Dizziness. 90 tablet 3    melatonin (MELATIN) 3 mg tablet Take 3 tablets (9 mg total) by mouth nightly as needed for Insomnia. 90 tablet 3    pantoprazole (PROTONIX) 40 MG tablet Take 1 tablet (40 mg total) by mouth once daily. 90 tablet 3    patiromer calcium sorbitex (VELTASSA) 8.4 gram PwPk Take 2 packets (16.8 g total) by mouth once daily. 180 packet 2    sevelamer HCL (RENAGEL) 800 MG Tab Take 2 tablets (1,600 mg total) by mouth 3 (three) times daily with meals. 180 tablet 11    B complex-vitamin C-folic acid (MELLISSA-BENJY) 0.8 mg Tab Take 1 tablet (0.8 mg total) by mouth once daily. 30 tablet 3    betahistine HCl (BETAHISTINE, BULK, MISC)       blood sugar diagnostic (TRUE METRIX GLUCOSE TEST STRIP) Strp USE ONE STRIP FOR TESTING 2 TIMES A  each 11    blood-glucose meter kit Use to test twice a day 1 each 0    calcium-vitamin D3 (OS-CIPRIANO 500 + D3) 500 mg-5 mcg (200 unit) per tablet Take 1 tablet by mouth once daily. 30 tablet 3    coenzyme Q10 100 mg capsule       dicyclomine (BENTYL) 10 MG capsule       dronabinoL (MARINOL) 5 MG capsule Take 1 capsule (5 mg total) by mouth 2 (two) times daily before meals. 60 capsule 1    econazole nitrate 1 % cream Apply topically 2 (two) times daily. 30 g 2    insulin aspart U-100 (NOVOLOG) 100 unit/mL (3 mL) InPn pen Inject before meals:  150-200=+1, 201-250=+2, 251-300=+3; 301-350=+4, over 350=+5 units 15 mL 0    lancets Misc 1 lancet by Misc.(Non-Drug; Combo Route) route 4 (four) times daily. 400 each 3     "mometasone (ELOCON) 0.1 % ointment Apply topically.      neomycin (MYCIFRADIN) 500 mg Tab       nutritional supplements Liqd Take 237 mLs by mouth 4 (four) times daily. 120 each 6    oxyCODONE (ROXICODONE) 5 MG immediate release tablet Take 5 mg by mouth every 4 (four) hours as needed.      pen needle, diabetic (BD ULTRA-FINE MARIS PEN NEEDLE) 32 gauge x 5/32" Ndle 1 Device by Misc.(Non-Drug; Combo Route) route 4 (four) times daily with meals and nightly. 400 each 3    pregabalin (LYRICA) 150 MG capsule TAKE ONE CAPSULE BY MOUTH 2 TIMES A DAY 60 capsule 2    pregabalin (LYRICA) 75 MG capsule Take 1 capsule (75 mg total) by mouth Daily. Give after HD 30 capsule 0    simethicone (MYLICON) 125 mg Cap capsule Take by mouth.      vit C,N-Xo-bdhhh-lutein-zeaxan (PRESERVISION AREDS 2) 464-886-95-1 mg-unit-mg-mg Cap       vitamins  A,C,E-zinc-copper [phone] unit-mg-unit Cap Take 1 tablet by mouth once daily.         Past Surgical History:   Procedure Laterality Date    APPENDECTOMY      BREAST SURGERY      Tags    CARPAL TUNNEL RELEASE Bilateral 09/2017    ,     CLOSURE, COLOSTOMY Left 1/16/2023    Procedure: CLOSURE, COLOSTOMY;  Surgeon: Manuel Javier MD;  Location: [facility] OR;  Service: General;  Laterality: Left;    COLONOSCOPY  04/2018    Normal  ( Juan A)     COLOSTOMY Right 1/16/2023    Procedure: CREATION, COLOSTOMY;  Surgeon: Manuel Javier MD;  Location: [facility] OR;  Service: General;  Laterality: Right;    DILATION AND CURETTAGE OF UTERUS  1986    DUPUYTREN CONTRACTURE RELEASE Bilateral 09/2017    ESOPHAGOGASTRODUODENOSCOPY N/A 1/24/2024    Procedure: EGD (ESOPHAGOGASTRODUODENOSCOPY);  Surgeon: Yamilet Walters MD;  Location: [address]);  Service: Gastroenterology;  Laterality: N/A;    HYSTERECTOMY  1987    BEAU w/ appy, no BSO     IMPLANTATION OF LEADLESS PACEMAKER N/A 1/16/2024    Procedure: INSERTION, CARDIAC PACEMAKER, LEADLESS;  Surgeon: LENIN Frances MD;  Location: [facility] LAB; "  Service: Cardiology;  Laterality: N/A;  SB, LEADLESS PPM (MICRA), MDT, ANES, EH, CVICU [id number]    INJECTION OF NEUROLYTIC AGENT AROUND LUMBAR SYMPATHETIC NERVE N/A 1/6/2022    Procedure: BLOCK, LUMBAR SYMPATHETIC;  Surgeon: Souleymane Rizo Jr., MD;  Location: [facility] PAIN MGT;  Service: Pain Management;  Laterality: N/A;  no pacemaker   pt is diabetic     INJECTION OF NEUROLYTIC AGENT AROUND LUMBAR SYMPATHETIC NERVE N/A 8/25/2022    Procedure: BLOCK, LUMBAR SYMPATHETIC;  Surgeon: Souleymane Rizo Jr., MD;  Location: [facility] PAIN MGT;  Service: Pain Management;  Laterality: N/A;  diabetic     INSERTION OF TUNNELED CENTRAL VENOUS HEMODIALYSIS CATHETER Right 1/25/2023    Procedure: INSERTION, CATHETER, HEMODIALYSIS, DUAL LUMEN;  Surgeon: Manuel Javier MD;  Location: [facility] OR;  Service: General;  Laterality: Right;    INSERTION, CATHETER, TUNNELED Left 1/28/2023    Procedure: Insertion,catheter,tunneled;  Surgeon: Watson Fontenot MD;  Location: [facility] OR;  Service: General;  Laterality: Left;    LAPAROTOMY, EXPLORATORY N/A 1/14/2023    Procedure: LAPAROTOMY, EXPLORATORY;  Surgeon: Manuel Javier MD;  Location: [facility] OR;  Service: General;  Laterality: N/A;    LAPAROTOMY, EXPLORATORY N/A 1/16/2023    Procedure: LAPAROTOMY, EXPLORATORY;  Surgeon: Manuel Javier MD;  Location: [facility] OR;  Service: General;  Laterality: N/A;    LYMPHADENECTOMY Right 1/2/2024    Procedure: LYMPHADENECTOMY;  Surgeon: Tan Thomson MD;  [address];  Service: Cardiothoracic;  Laterality: Right;    PACEMAKER, TEMPORARY, TRANSVENOUS, PEDIATRIC Right 1/12/2024    Procedure: Pacemaker, Temporary, Transvenous;  Surgeon: Todd Carlisle MD;  Location: [facility] CATH LAB;  Service: Cardiology;  Laterality: Right;    REMOVAL OF CATHETER Right 1/28/2023    Procedure: REMOVAL, CATHETER;  Surgeon: Watson Fontenot MD;  Location: [facility] OR;  Service: General;  Laterality: Right;    REMOVAL, TUNNELED CATH Right 1/25/2023    Procedure:  REMOVAL, TUNNELED CATH;  Surgeon: Manuel Javier MD;  Location: [facility] OR;  Service: General;  Laterality: Right;    ROBOTIC BRONCHOSCOPY N/A 10/20/2023    Procedure: ROBOTIC BRONCHOSCOPY;  Surgeon: Mayda Monzon MD;  [facility];  Service: Pulmonary;  Laterality: N/A;    SHOULDER SURGERY  2009 & 2010    right rotator cuff    THORACOTOMY Right 1/2/2024    Procedure: THORACOTOMY;  Surgeon: Tan Thomson MD;  [facility];  Service: Cardiothoracic;  Laterality: Right;    THORACOTOMY, WITH INITIAL THERAPEUTIC WEDGE RESECTION OF LUNG Right 1/2/2024    Procedure: THORACOTOMY, WITH INITIAL THERAPEUTIC WEDGE RESECTION OF LUNG;  Surgeon: Tan Thomson MD;  [facility];  Service: Cardiothoracic;  Laterality: Right;    TONSILLECTOMY      UPPER GASTROINTESTINAL ENDOSCOPY  04/2018       Social History     Socioeconomic History    Marital status:    Tobacco Use    Smoking status: Never    Smokeless tobacco: Never   Substance and Sexual Activity    Alcohol use: No    Drug use: Never    Sexual activity: Not Currently     Partners: Male     Birth control/protection: See Surgical Hx     Comment: :      Social Determinants of Health     Financial Resource Strain: Low Risk  (11/22/2023)    Overall Financial Resource Strain (CARDIA)     Difficulty of Paying Living Expenses: Not hard at all   Food Insecurity: No Food Insecurity (11/22/2023)    Hunger Vital Sign     Worried About Running Out of Food in the Last Year: Never true     Ran Out of Food in the Last Year: Never true   Transportation Needs: No Transportation Needs (11/22/2023)    PRAPARE - Transportation     Lack of Transportation (Medical): No     Lack of Transportation (Non-Medical): No   Physical Activity: Insufficiently Active (11/22/2023)    Exercise Vital Sign     Days of Exercise per Week: 4 days     Minutes of Exercise per Session: 10 min   Stress: Stress Concern Present (11/22/2023)    [employer] - Occupational Stress Questionnaire     Feeling of Stress : To some extent   Social Connections: Unknown (2023)    Social Connection and Isolation Panel [NHANES]     Frequency of Communication with Friends and Family: More than three times a week     Frequency of Social Gatherings with Friends and Family: More than three times a week     Active Member of Clubs or Organizations: Yes     Attends Club or Organization Meetings: More than 4 times per year     Marital Status:    Housing Stability: Low Risk  (2023)    Housing Stability Vital Sign     Unable to Pay for Housing in the Last Year: No     Number of Places Lived in the Last Year: 1     Unstable Housing in the Last Year: No         CBC:   Recent Labs     24  1715 24  0421   WBC 10.09 11.23   RBC 1.97* 3.32*   HGB 6.3* 9.6*   HCT 20.7* 29.2*   PLT 78* 184   * 88   MCH 32.0* 28.9   MCHC 30.4* 32.9       CMP:   Recent Labs     24  0320 24  0421    134*   K 4.7 4.6    104   CO2 25 25   BUN 53* 63*   CREATININE 2.9* 3.7*   * 127*   MG 1.9 1.9   PHOS 3.0 3.6   CALCIUM 8.6* 8.5*   ALBUMIN 2.0* 2.0*   PROT 4.8* 4.6*   ALKPHOS 83 84   ALT 7* 5*   AST 13 13   BILITOT 0.6 1.1*       INR  Recent Labs     24  1104 24  1221 24  1544   INR  --   --  1.3*   APTT >150.0* 69.0* 80.9*           Diagnostic Studies:      EKD Echo:  No results found for this or any previous visit.        Pre-op Assessment    I have reviewed the Patient Summary Reports.     I have reviewed the Nursing Notes. I have reviewed the NPO Status.   I have reviewed the Medications.     Review of Systems  Anesthesia Hx:  No problems with previous Anesthesia   History of prior surgery of interest to airway management or planning: lung surgery. Previous anesthesia: General          Denies Personal Hx of Anesthesia complications.                    Social:  No Alcohol Use, Non-Smoker        Hematology/Oncology:       -- Anemia:               Hematology Comments: Acute anemia (Hgb 5.5) in setting of hematochezia                    Cardiovascular:  Exercise tolerance: poor  Pacemaker Hypertension    Dysrhythmias (Tachy-Ra, s/p PPM/AICD placement) atrial fibrillation   Denies CHF.       ECG has been reviewed.                          Pulmonary:    Denies COPD.  Denies Asthma.     Denies Sleep Apnea.                Renal/:  Chronic Renal Disease (HD via R IJ Temp HD Cath), ESRD, Dialysis                Hepatic/GI:     GERD Denies Liver Disease.  Concern for upper GI bleed 2/2 hematochezia with acute anemia          Neurological:    Denies CVA.    Denies Seizures.          Peripheral Neuropathy                          Endocrine:  Diabetes, well controlled, type 2 Denies Hypothyroidism.        Denies Obesity / BMI > 30  Psych:   anxiety                 Physical Exam  General: Well nourished, Cooperative and Alert    Airway:  Mallampati: III   Mouth Opening: Small, but > 3cm  TM Distance: Normal  Tongue: Normal  Neck ROM: Normal ROM    Dental:    Chest/Lungs:  Normal Respiratory Rate    Heart:  Rate: Normal        Anesthesia Plan  Type of Anesthesia, risks & benefits discussed:    Anesthesia Type: Gen ETT, Gen Natural Airway  Intra-op Monitoring Plan: Standard ASA Monitors  Post Op Pain Control Plan: multimodal analgesia and IV/PO Opioids PRN  Induction:  IV  Airway Plan: Direct, Post-Induction  Informed Consent: Informed consent signed with the Patient and all parties understand the risks and agree with anesthesia plan.  All questions answered.   ASA Score: 3  Day of Surgery Review of History & Physical: H&P Update referred to the surgeon/provider.    Ready For Surgery From Anesthesia Perspective.     .

## 2024-01-26 NOTE — PROGRESS NOTES
[facility]  Gastroenterology  Progress Note    Patient Name: Lily Green  MRN: [id number]  Admission Date: 1/2/2024  Hospital Length of Stay: 24 days  Code Status: Full Code   Attending Provider: Tan Thomson MD  Consulting Provider: Minerva Russ NP  Primary Care Physician: Pavel Calixto MD  Principal Problem: Malignant carcinoid tumor of bronchus    Subjective:     Interval History: Followed up with patient s/p EGD 11/24. Likely candida esophagitis noted as well as food in stomach so procedure was aborted. Notified last night of patients large melanotic stool requiring 2U pRBC.    Review of Systems   Constitutional:  Positive for appetite change. Negative for activity change.   HENT:  Positive for trouble swallowing. Negative for sore throat.    Gastrointestinal:  Positive for blood in stool. Negative for abdominal distention, abdominal pain, anal bleeding, constipation, diarrhea, nausea, rectal pain and vomiting.   Skin:  Negative for color change and pallor.   Neurological:  Positive for weakness. Negative for dizziness.     Objective:     Vital Signs (Most Recent):  Temp: 97.5 °F (36.4 °C) (01/26/24 0739)  Pulse: 62 (01/26/24 0740)  Resp: 18 (01/26/24 0739)  BP: (!) 108/53 (01/26/24 0739)  SpO2: (!) 94 % (01/26/24 0739) Vital Signs (24h Range):  Temp:  [97.3 °F (36.3 °C)-98.4 °F (36.9 °C)] 97.5 °F (36.4 °C)  Pulse:  [61-78] 62  Resp:  [11-20] 18  SpO2:  [94 %-100 %] 94 %  BP: ()/(46-82) 108/53     Weight: 67.6 kg (149 lb) (01/25/24 0600)  Body mass index is 24.79 kg/m².      Intake/Output Summary (Last 24 hours) at 1/26/2024 0916  Last data filed at 1/26/2024 0545  Gross per 24 hour   Intake 1617.92 ml   Output 40 ml   Net 1577.92 ml         Lines/Drains/Airways       Central Venous Catheter Line  Duration                  Hemodialysis Catheter 01/25/23 1501 right internal jugular 365 days              Drain  Duration                  Chest Tube 01/17/24 1504 Right Pleural 10  Fr. 8 days              Peripheral Intravenous Line  Duration                  Hemodialysis AV Fistula 09/01/23 0800 Left forearm 147 days         Midline Catheter Insertion/Assessment  - Single Lumen 01/22/24 1707 Right basilic vein (medial side of arm) 18g x 10cm 3 days                     Physical Exam  Vitals reviewed.   Constitutional:       Appearance: She is normal weight. She is not ill-appearing.   HENT:      Mouth/Throat:      Mouth: Mucous membranes are moist.      Pharynx: Oropharynx is clear.   Eyes:      General: No scleral icterus.  Abdominal:      General: Bowel sounds are normal. There is no distension.      Palpations: Abdomen is soft. There is no mass.      Tenderness: There is no abdominal tenderness.   Skin:     General: Skin is warm and dry.      Capillary Refill: Capillary refill takes less than 2 seconds.      Coloration: Skin is not jaundiced or pale.   Neurological:      Mental Status: She is alert and oriented to person, place, and time. Mental status is at baseline.          Significant Labs:  CBC:   Recent Labs   Lab 01/25/24  0927 01/25/24  1715 01/26/24  0421   WBC 14.79* 10.09 11.23   HGB 7.6* 6.3* 9.6*   HCT 23.8* 20.7* 29.2*    78* 184       CMP:   Recent Labs   Lab 01/26/24  0421   *   CALCIUM 8.5*   ALBUMIN 2.0*   PROT 4.6*   *   K 4.6   CO2 25      BUN 63*   CREATININE 3.7*   ALKPHOS 84   ALT 5*   AST 13   BILITOT 1.1*           Significant Imaging:  Imaging results within the past 24 hours have been reviewed.  Assessment/Plan:     GI  GIB (gastrointestinal bleeding)  73 year old female with ESRD on HD, GERD, carcinoid tumor of the right middle lobe s/p thoracotomy and resection with mediastinal lymph node dissection 1/2/2024, peritonitis secondary to diffuse colonic ischemia s/p ex-lap with total colectomy and end ileostomy 1/14/24 complicated by intra-abdominal hematoma requiring evacuation and ultimately ileostomy reversal  and ileocolonic anastomosis  8/2023 with complicated hospital course now with large episode of black stools with associated clots. Hgb 5.5, PTT 80.9, INR 1.3 as of 1/23, now s/p 3 U pRBCs, FFP and protamine without further episodes of bleeding. She remains HDS not requiring pressors. EGD 1/24 with candida. Procedure aborted due to food in the stomach. Large melanotic stool overnight. 2U pRBC.     Recommendations:  - NPO for repeat EGD today          Thank you for your consult.  Recommendations pending EGD findings.    Minerva Russ, AZUL  Gastroenterology  [facility]

## 2024-01-26 NOTE — TREATMENT PLAN
GI Post Procedure Treatment Plan  Procedure Performed: EGD    Impression:    - Normal esophagus.                          - A large amount of food (residue) in the stomach.                          - Non-bleeding gastric ulcer with a clean ulcer                          base (Mateusz Class III).                          - Retained food in the duodenum.                          - No specimens collected.     Recommendation:                                 - Return patient to hospital senior for ongoing care.                          - Resume previous diet.                          - Use Prilosec (omeprazole) 40 mg PO BID for 8                          weeks.                          - Perform an H. pylori stool antigen (HpSA) test                          today.                          - Repeat upper endoscopy in 8 weeks to check                          healing.                          - Recommend a motility agent.     Nadia Herring MD  Gastroenterology, PGY-4

## 2024-01-27 LAB
ALBUMIN SERPL BCP-MCNC: 2.1 G/DL (ref 3.5–5.2)
ALP SERPL-CCNC: 99 U/L (ref 55–135)
ALT SERPL W/O P-5'-P-CCNC: 5 U/L (ref 10–44)
ANION GAP SERPL CALC-SCNC: 7 MMOL/L (ref 8–16)
AST SERPL-CCNC: 16 U/L (ref 10–40)
BASOPHILS # BLD AUTO: 0.02 K/UL (ref 0–0.2)
BASOPHILS # BLD AUTO: 0.02 K/UL (ref 0–0.2)
BASOPHILS NFR BLD: 0.2 % (ref 0–1.9)
BASOPHILS NFR BLD: 0.2 % (ref 0–1.9)
BILIRUB SERPL-MCNC: 0.6 MG/DL (ref 0.1–1)
BUN SERPL-MCNC: 43 MG/DL (ref 8–23)
CALCIUM SERPL-MCNC: 8.5 MG/DL (ref 8.7–10.5)
CHLORIDE SERPL-SCNC: 103 MMOL/L (ref 95–110)
CO2 SERPL-SCNC: 25 MMOL/L (ref 23–29)
CREAT SERPL-MCNC: 3.3 MG/DL (ref 0.5–1.4)
DIFFERENTIAL METHOD BLD: ABNORMAL
DIFFERENTIAL METHOD BLD: ABNORMAL
EOSINOPHIL # BLD AUTO: 0 K/UL (ref 0–0.5)
EOSINOPHIL # BLD AUTO: 0 K/UL (ref 0–0.5)
EOSINOPHIL NFR BLD: 0.2 % (ref 0–8)
EOSINOPHIL NFR BLD: 0.4 % (ref 0–8)
ERYTHROCYTE [DISTWIDTH] IN BLOOD BY AUTOMATED COUNT: 26.5 % (ref 11.5–14.5)
ERYTHROCYTE [DISTWIDTH] IN BLOOD BY AUTOMATED COUNT: 27 % (ref 11.5–14.5)
EST. GFR  (NO RACE VARIABLE): 14.2 ML/MIN/1.73 M^2
GLUCOSE SERPL-MCNC: 95 MG/DL (ref 70–110)
HCT VFR BLD AUTO: 30.1 % (ref 37–48.5)
HCT VFR BLD AUTO: 30.5 % (ref 37–48.5)
HGB BLD-MCNC: 9.6 G/DL (ref 12–16)
HGB BLD-MCNC: 9.8 G/DL (ref 12–16)
IMM GRANULOCYTES # BLD AUTO: 0.11 K/UL (ref 0–0.04)
IMM GRANULOCYTES # BLD AUTO: 0.14 K/UL (ref 0–0.04)
IMM GRANULOCYTES NFR BLD AUTO: 1 % (ref 0–0.5)
IMM GRANULOCYTES NFR BLD AUTO: 1.5 % (ref 0–0.5)
LYMPHOCYTES # BLD AUTO: 0.7 K/UL (ref 1–4.8)
LYMPHOCYTES # BLD AUTO: 1.1 K/UL (ref 1–4.8)
LYMPHOCYTES NFR BLD: 11.8 % (ref 18–48)
LYMPHOCYTES NFR BLD: 6.5 % (ref 18–48)
MAGNESIUM SERPL-MCNC: 1.9 MG/DL (ref 1.6–2.6)
MCH RBC QN AUTO: 28.9 PG (ref 27–31)
MCH RBC QN AUTO: 28.9 PG (ref 27–31)
MCHC RBC AUTO-ENTMCNC: 31.9 G/DL (ref 32–36)
MCHC RBC AUTO-ENTMCNC: 32.1 G/DL (ref 32–36)
MCV RBC AUTO: 90 FL (ref 82–98)
MCV RBC AUTO: 91 FL (ref 82–98)
MONOCYTES # BLD AUTO: 1.1 K/UL (ref 0.3–1)
MONOCYTES # BLD AUTO: 1.2 K/UL (ref 0.3–1)
MONOCYTES NFR BLD: 10.3 % (ref 4–15)
MONOCYTES NFR BLD: 12.7 % (ref 4–15)
NEUTROPHILS # BLD AUTO: 6.8 K/UL (ref 1.8–7.7)
NEUTROPHILS # BLD AUTO: 9.1 K/UL (ref 1.8–7.7)
NEUTROPHILS NFR BLD: 73.4 % (ref 38–73)
NEUTROPHILS NFR BLD: 81.8 % (ref 38–73)
NRBC BLD-RTO: 0 /100 WBC
NRBC BLD-RTO: 0 /100 WBC
PHOSPHATE SERPL-MCNC: 3.1 MG/DL (ref 2.7–4.5)
PLATELET # BLD AUTO: 130 K/UL (ref 150–450)
PLATELET # BLD AUTO: 145 K/UL (ref 150–450)
PMV BLD AUTO: 11.3 FL (ref 9.2–12.9)
PMV BLD AUTO: 9.7 FL (ref 9.2–12.9)
POCT GLUCOSE: 119 MG/DL (ref 70–110)
POCT GLUCOSE: 136 MG/DL (ref 70–110)
POCT GLUCOSE: 160 MG/DL (ref 70–110)
POCT GLUCOSE: 92 MG/DL (ref 70–110)
POTASSIUM SERPL-SCNC: 4.3 MMOL/L (ref 3.5–5.1)
PROT SERPL-MCNC: 4.9 G/DL (ref 6–8.4)
RBC # BLD AUTO: 3.32 M/UL (ref 4–5.4)
RBC # BLD AUTO: 3.39 M/UL (ref 4–5.4)
SODIUM SERPL-SCNC: 135 MMOL/L (ref 136–145)
WBC # BLD AUTO: 11.08 K/UL (ref 3.9–12.7)
WBC # BLD AUTO: 9.26 K/UL (ref 3.9–12.7)

## 2024-01-27 PROCEDURE — 99900035 HC TECH TIME PER 15 MIN (STAT)

## 2024-01-27 PROCEDURE — 94640 AIRWAY INHALATION TREATMENT: CPT

## 2024-01-27 PROCEDURE — 25000242 PHARM REV CODE 250 ALT 637 W/ HCPCS: Performed by: STUDENT IN AN ORGANIZED HEALTH CARE EDUCATION/TRAINING PROGRAM

## 2024-01-27 PROCEDURE — 94799 UNLISTED PULMONARY SVC/PX: CPT | Mod: XB

## 2024-01-27 PROCEDURE — 25000003 PHARM REV CODE 250: Performed by: SURGERY

## 2024-01-27 PROCEDURE — 85025 COMPLETE CBC W/AUTO DIFF WBC: CPT | Performed by: SURGERY

## 2024-01-27 PROCEDURE — 25000003 PHARM REV CODE 250

## 2024-01-27 PROCEDURE — A4216 STERILE WATER/SALINE, 10 ML: HCPCS | Performed by: STUDENT IN AN ORGANIZED HEALTH CARE EDUCATION/TRAINING PROGRAM

## 2024-01-27 PROCEDURE — 27000221 HC OXYGEN, UP TO 24 HOURS

## 2024-01-27 PROCEDURE — 80053 COMPREHEN METABOLIC PANEL: CPT | Performed by: STUDENT IN AN ORGANIZED HEALTH CARE EDUCATION/TRAINING PROGRAM

## 2024-01-27 PROCEDURE — 25000003 PHARM REV CODE 250: Performed by: STUDENT IN AN ORGANIZED HEALTH CARE EDUCATION/TRAINING PROGRAM

## 2024-01-27 PROCEDURE — 25000242 PHARM REV CODE 250 ALT 637 W/ HCPCS

## 2024-01-27 PROCEDURE — 94761 N-INVAS EAR/PLS OXIMETRY MLT: CPT

## 2024-01-27 PROCEDURE — 63600175 PHARM REV CODE 636 W HCPCS

## 2024-01-27 PROCEDURE — 84100 ASSAY OF PHOSPHORUS: CPT

## 2024-01-27 PROCEDURE — 97530 THERAPEUTIC ACTIVITIES: CPT | Mod: CQ

## 2024-01-27 PROCEDURE — 36415 COLL VENOUS BLD VENIPUNCTURE: CPT | Performed by: SURGERY

## 2024-01-27 PROCEDURE — 83735 ASSAY OF MAGNESIUM: CPT

## 2024-01-27 PROCEDURE — 94668 MNPJ CHEST WALL SBSQ: CPT

## 2024-01-27 PROCEDURE — 97116 GAIT TRAINING THERAPY: CPT | Mod: CQ

## 2024-01-27 PROCEDURE — 20600001 HC STEP DOWN PRIVATE ROOM

## 2024-01-27 PROCEDURE — 97535 SELF CARE MNGMENT TRAINING: CPT

## 2024-01-27 RX ORDER — SODIUM CHLORIDE 9 MG/ML
INJECTION, SOLUTION INTRAVENOUS ONCE
Status: COMPLETED | OUTPATIENT
Start: 2024-01-29 | End: 2024-01-29

## 2024-01-27 RX ORDER — ACETYLCYSTEINE 100 MG/ML
4 SOLUTION ORAL; RESPIRATORY (INHALATION)
Status: DISCONTINUED | OUTPATIENT
Start: 2024-01-27 | End: 2024-02-06 | Stop reason: HOSPADM

## 2024-01-27 RX ADMIN — ERYTHROMYCIN ETHYLSUCCINATE 200 MG: 200 SUSPENSION ORAL at 11:01

## 2024-01-27 RX ADMIN — METRONIDAZOLE 500 MG: 500 TABLET ORAL at 08:01

## 2024-01-27 RX ADMIN — SENNOSIDES AND DOCUSATE SODIUM 1 TABLET: 8.6; 5 TABLET ORAL at 08:01

## 2024-01-27 RX ADMIN — VENLAFAXINE 37.5 MG: 37.5 TABLET ORAL at 08:01

## 2024-01-27 RX ADMIN — ERYTHROMYCIN ETHYLSUCCINATE 200 MG: 200 SUSPENSION ORAL at 04:01

## 2024-01-27 RX ADMIN — Medication 10 ML: at 06:01

## 2024-01-27 RX ADMIN — Medication 10 ML: at 04:01

## 2024-01-27 RX ADMIN — ERYTHROMYCIN ETHYLSUCCINATE 200 MG: 200 SUSPENSION ORAL at 08:01

## 2024-01-27 RX ADMIN — OXYBUTYNIN CHLORIDE 5 MG: 5 TABLET ORAL at 03:01

## 2024-01-27 RX ADMIN — MIDODRINE HYDROCHLORIDE 10 MG: 5 TABLET ORAL at 08:01

## 2024-01-27 RX ADMIN — METOPROLOL TARTRATE 12.5 MG: 25 TABLET, FILM COATED ORAL at 08:01

## 2024-01-27 RX ADMIN — METOPROLOL TARTRATE 12.5 MG: 25 TABLET, FILM COATED ORAL at 09:01

## 2024-01-27 RX ADMIN — ERYTHROMYCIN ETHYLSUCCINATE 200 MG: 200 SUSPENSION ORAL at 09:01

## 2024-01-27 RX ADMIN — METRONIDAZOLE 500 MG: 500 TABLET ORAL at 09:01

## 2024-01-27 RX ADMIN — PANTOPRAZOLE SODIUM 40 MG: 40 TABLET, DELAYED RELEASE ORAL at 04:01

## 2024-01-27 RX ADMIN — Medication 10 ML: at 11:01

## 2024-01-27 RX ADMIN — DRONABINOL 5 MG: 2.5 CAPSULE ORAL at 09:01

## 2024-01-27 RX ADMIN — INSULIN ASPART 2 UNITS: 100 INJECTION, SOLUTION INTRAVENOUS; SUBCUTANEOUS at 08:01

## 2024-01-27 RX ADMIN — DRONABINOL 5 MG: 2.5 CAPSULE ORAL at 08:01

## 2024-01-27 RX ADMIN — POLYETHYLENE GLYCOL 3350 17 G: 17 POWDER, FOR SOLUTION ORAL at 08:01

## 2024-01-27 RX ADMIN — FLUCONAZOLE 200 MG: 200 TABLET ORAL at 09:01

## 2024-01-27 RX ADMIN — ALBUTEROL SULFATE 2.5 MG: 2.5 SOLUTION RESPIRATORY (INHALATION) at 12:01

## 2024-01-27 RX ADMIN — MIDODRINE HYDROCHLORIDE 10 MG: 5 TABLET ORAL at 04:01

## 2024-01-27 RX ADMIN — OXYBUTYNIN CHLORIDE 5 MG: 5 TABLET ORAL at 08:01

## 2024-01-27 RX ADMIN — ALBUTEROL SULFATE 2.5 MG: 2.5 SOLUTION RESPIRATORY (INHALATION) at 08:01

## 2024-01-27 RX ADMIN — INSULIN ASPART 2 UNITS: 100 INJECTION, SOLUTION INTRAVENOUS; SUBCUTANEOUS at 04:01

## 2024-01-27 RX ADMIN — INSULIN ASPART 2 UNITS: 100 INJECTION, SOLUTION INTRAVENOUS; SUBCUTANEOUS at 11:01

## 2024-01-27 RX ADMIN — CEFPODOXIME PROXETIL 200 MG: 200 TABLET, FILM COATED ORAL at 09:01

## 2024-01-27 RX ADMIN — Medication 10 ML: at 12:01

## 2024-01-27 RX ADMIN — LIDOCAINE 5% 1 PATCH: 700 PATCH TOPICAL at 08:01

## 2024-01-27 RX ADMIN — OXYBUTYNIN CHLORIDE 5 MG: 5 TABLET ORAL at 09:01

## 2024-01-27 NOTE — PLAN OF CARE
Problem: Adult Inpatient Plan of Care  Goal: Plan of Care Review  Outcome: Ongoing, Progressing  Goal: Patient-Specific Goal (Individualized)  Outcome: Ongoing, Progressing  Goal: Absence of Hospital-Acquired Illness or Injury  Outcome: Ongoing, Progressing  Goal: Optimal Comfort and Wellbeing  Outcome: Ongoing, Progressing  Goal: Readiness for Transition of Care  Outcome: Ongoing, Progressing     Problem: Diabetes Comorbidity  Goal: Blood Glucose Level Within Targeted Range  Outcome: Ongoing, Progressing     Problem: Infection  Goal: Absence of Infection Signs and Symptoms  Outcome: Ongoing, Progressing     Problem: Device-Related Complication Risk (Hemodialysis)  Goal: Safe, Effective Therapy Delivery  Outcome: Ongoing, Progressing     Problem: Hemodynamic Instability (Hemodialysis)  Goal: Effective Tissue Perfusion  Outcome: Ongoing, Progressing     Problem: Infection (Hemodialysis)  Goal: Absence of Infection Signs and Symptoms  Outcome: Ongoing, Progressing     Problem: Fall Injury Risk  Goal: Absence of Fall and Fall-Related Injury  Outcome: Ongoing, Progressing     Problem: Skin Injury Risk Increased  Goal: Skin Health and Integrity  Outcome: Ongoing, Progressing     Problem: Device-Related Complication Risk (CRRT (Continuous Renal Replacement Therapy))  Goal: Safe, Effective Therapy Delivery  Outcome: Ongoing, Progressing     Problem: Hypothermia (CRRT (Continuous Renal Replacement Therapy))  Goal: Body Temperature Maintained in Desired Range  Outcome: Ongoing, Progressing     Problem: Infection (CRRT (Continuous Renal Replacement Therapy))  Goal: Absence of Infection Signs and Symptoms  Outcome: Ongoing, Progressing     Problem: Fluid Imbalance (Pneumonia)  Goal: Fluid Balance  Outcome: Ongoing, Progressing     Problem: Infection (Pneumonia)  Goal: Resolution of Infection Signs and Symptoms  Outcome: Ongoing, Progressing     Problem: Respiratory Compromise (Pneumonia)  Goal: Effective Oxygenation and  Ventilation  Outcome: Ongoing, Progressing     Problem: Electrolyte Imbalance (Chronic Kidney Disease)  Goal: Electrolyte Balance  Outcome: Ongoing, Progressing     Problem: Fluid Volume Excess (Chronic Kidney Disease)  Goal: Fluid Balance  Outcome: Ongoing, Progressing

## 2024-01-27 NOTE — PROGRESS NOTES
Jeovanny Dow - [facility]  General Surgery  Progress Note    Subjective:     History of Present Illness:  No notes on file    Post-Op Info:  Procedure(s) (LRB):  EGD (ESOPHAGOGASTRODUODENOSCOPY) (N/A)   1 Day Post-Op     Interval History: Patient seen and examined. Complaints of new cough this morning, unproductive. EGD with non-bleeding ulcers. H/H remains stable over last 24hrs.     Medications:  Continuous Infusions:  Scheduled Meds:   cefpodoxime  200 mg Oral QHS    droNABinol  5 mg Oral BID    epoetin noble (PROCRIT) injection  4,000 Units Intravenous Every Mon, Wed, Fri    erythromycin ethylsuccinate  200 mg Oral QID (WM & HS)    fluconazole  200 mg Oral QHS    insulin aspart U-100  2 Units Subcutaneous TIDWM    insulin detemir U-100  6 Units Subcutaneous Daily    LIDOcaine  1 patch Transdermal Q24H    metoprolol tartrate  12.5 mg Oral BID    metroNIDAZOLE  500 mg Oral Q12H    midodrine  10 mg Oral BID WM    oxybutynin  5 mg Oral TID    pantoprazole  40 mg Oral BID AC    polyethylene glycol  17 g Oral Daily    senna-docusate 8.6-50 mg  1 tablet Oral Daily    sodium chloride 0.9%  10 mL Intravenous Q6H    venlafaxine  37.5 mg Oral Daily     PRN Meds:sodium chloride 0.9%, acetaminophen, albuterol sulfate, bisacodyL, dextrose 10%, dextrose 10%, glucagon (human recombinant), glucose, glucose, heparin (porcine), hydrALAZINE, insulin aspart U-100, midodrine, ondansetron, sodium chloride 0.9%, Flushing PICC/Midline Protocol **AND** sodium chloride 0.9% **AND** sodium chloride 0.9%     Review of patient's allergies indicates:   Allergen Reactions    Crestor [rosuvastatin] Swelling    Gluten protein      Objective:     Vital Signs (Most Recent):  Temp: 98.6 °F (37 °C) (01/27/24 0818)  Pulse: 65 (01/27/24 0818)  Resp: 17 (01/27/24 0818)  BP: 135/67 (01/27/24 0818)  SpO2: (!) 93 % (01/27/24 0818) Vital Signs (24h Range):  Temp:  [97.4 °F (36.3 °C)-98.6 °F (37 °C)] 98.6 °F (37 °C)  Pulse:  [61-79] 65  Resp:  [14-30] 17  SpO2:  [93  %-100 %] 93 %  BP: (113-200)/(55-86) 135/67     Weight: 67.6 kg (149 lb)  Body mass index is 24.79 kg/m².    Intake/Output - Last 3 Shifts         01/25 0700 01/26 0659 01/26 0700 01/27 0659 01/27 0700 01/28 0659    P.O. 717 240     I.V. (mL/kg) 150 (2.2) 10 (0.1)     Blood 972.9      IV Piggyback  400     Total Intake(mL/kg) 1839.9 (27.2) 650 (9.6)     Urine (mL/kg/hr) 0 (0)      Other  1600     Stool 0 0     Chest Tube 60 0     Total Output 60 1600     Net +1779.9 -950            Stool Occurrence 2 x 1 x 0 x             Physical Exam  Vitals and nursing note reviewed.   Constitutional:       Appearance: Normal appearance.   HENT:      Head: Normocephalic and atraumatic.      Nose: Nose normal.   Cardiovascular:      Rate and Rhythm: Normal rate and regular rhythm.      Pulses: Normal pulses.   Pulmonary:      Effort: Pulmonary effort is normal. No respiratory distress.      Comments: Right posterior chest incision c/d/I    Right chest permacath    Right Chest tube, 10 fr, serosanguinous output, waterseal   Abdominal:      General: Abdomen is flat. There is no distension.      Palpations: Abdomen is soft.      Tenderness: There is no abdominal tenderness.      Comments: Soft, nontender abdomen   Musculoskeletal:      Right lower leg: Edema present.      Left lower leg: Edema present.   Skin:     General: Skin is warm and dry.      Coloration: Skin is pale.      Comments: Groin soft, dressings c/d/i   Neurological:      General: No focal deficit present.      Mental Status: She is alert.          Significant Labs:  I have reviewed all pertinent lab results within the past 24 hours.  CBC:   Recent Labs   Lab 01/27/24  0452   WBC 9.26   RBC 3.32*   HGB 9.6*   HCT 30.1*   *   MCV 91   MCH 28.9   MCHC 31.9*     CMP:   Recent Labs   Lab 01/27/24  0452   GLU 95   CALCIUM 8.5*   ALBUMIN 2.1*   PROT 4.9*   *   K 4.3   CO2 25      BUN 43*   CREATININE 3.3*   ALKPHOS 99   ALT 5*   AST 16   BILITOT 0.6        Significant Diagnostics:  I have reviewed all pertinent imaging results/findings within the past 24 hours.  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 Stepped up to the ICU 1/5 for afib RVR which was refractory to medical management and c/b development of tachy-eulogio syndrome now s/p micra placement 1/16 for tachy-eulogio syndrome in the setting of RVR. Did have brief SBO type picture 1/7 which resolved with conservative management.     - s/p R thoracotomy with R lower lobectomy for carcinoid w/ MLND; IPV injury requiring repair. CT removed post operatively.  - Refractory afib RVR requiring ICU step up 1/05, s/p chemical cardioversion initially with recurrence c/b developing tachy-eulogio syndrome    - cards and EP following, appreciate assistance            - AC indicated 2/2 intermittent atrial fib episodes and chadsvasc score of 2     - s/p TVP placement and now micra implantation 1/16    - Heparin gtt resumed 1/22; will need to transition to eliquis likely following CT removal; held 1/23 2/2 GI bleed   - CT chest 1/13 w/ bilateral pleural effusion (R>L), pericardial effusion,   - s/p R chest tube placement with IR 1/17, L chest tube placement 1/18; L CT removed 1/25; likely DC remaining R CT today after HD   - CXR on 1/26 afternoon with apical and hilar consolidation   - repeat CXR today for further evaluation  - Pulmonary toilet: IS, Acapella, Mucomyst daily; CPT ordered  - Infectious work up 2/2 leukocytosis. She remains afebrile. Effusions and compressive atelectasis also likely contributing to leukocytosis.Trended up to 30k 1/16, now downtrending. Zosyn 1/11-1/15; Meropenam 1/16 - 1/20; Plan for Cefpodoxime and metronidazole to start 1/21 per ID. 4 week  abx plan for empiric treatment of presumed parapneumonic effusion - end date 2/15/2024.    - ID following, appreciate assistance. Reconsult following thora results.   - Bcx 1/11: Staph, probable contaminant  - Bcx1/12: No growth  - Rapid blood PCR: staph epi  - Sputum Cx 1/11: klebsiella & e. Coli  - R pleural fluid Cx 1/17: No growth (fungal pending still, neg KOH)  - SBO type episode 1/07 requiring NGT decompression, which resolved with conservative management   - GI bleed 1/23/2024 - heparin held, 2uPRBC, 1uFFP, heparin reversed. GI consulted                       - EGD 1/24 - could not visualize stomach much 2/2 food residue; EGD aborted                       - 2u PRBC overnight 1/25 for Hgb 6.3, slow downtrend. Repeat EGD 1/26 demonstrates non-bleeding ulcers  - clear liquid diet, novasource renal BID; marinol for appetite   - Bowel regimen, PPI  - Nephrology following, appreciate assistance. CRRT 1/17 overnight. HD trial 1/19 passed but requiring midodrine, 1.5 L LR, metoprolol for RVR. HD 1/26           - LUE AVF created 10.2023, US 1/22/2023 with good flow metrics, okay to use per vascular   - Oxybutinin for bladder spasms   - MM pain control as needed  - Continue PT/OT, mobilization, likely post hospital placement         Pranav Vega MD  General Surgery  Jeovanny michael - [facility]

## 2024-01-27 NOTE — ASSESSMENT & PLAN NOTE
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 Stepped up to the ICU 1/5 for afib RVR which was refractory to medical management and c/b development of tachy-eulogio syndrome now s/p micra placement 1/16 for tachy-eulogio syndrome in the setting of RVR. Did have brief SBO type picture 1/7 which resolved with conservative management.     - s/p R thoracotomy with R lower lobectomy for carcinoid w/ MLND; IPV injury requiring repair. CT removed post operatively.  - Refractory afib RVR requiring ICU step up 1/05, s/p chemical cardioversion initially with recurrence c/b developing tachy-eulogio syndrome    - cards and EP following, appreciate assistance            - AC indicated 2/2 intermittent atrial fib episodes and chadsvasc score of 2     - s/p TVP placement and now micra implantation 1/16    - Heparin gtt resumed 1/22; will need to transition to eliquis likely following CT removal; held 1/23 2/2 GI bleed   - CT chest 1/13 w/ bilateral pleural effusion (R>L), pericardial effusion,   - s/p R chest tube placement with IR 1/17, L chest tube placement 1/18; L CT removed 1/25; likely DC remaining R CT today after HD   - CXR on 1/26 afternoon with apical and hilar consolidation   - repeat CXR today for further evaluation  - Pulmonary toilet: IS, Acapella, Mucomyst daily; CPT ordered  - Infectious work up 2/2 leukocytosis. She remains afebrile. Effusions and compressive atelectasis also likely contributing to leukocytosis.Trended up to 30k 1/16, now downtrending. Zosyn 1/11-1/15; Meropenam 1/16 - 1/20; Plan for Cefpodoxime and metronidazole to start 1/21 per ID. 4 week abx plan for empiric treatment of presumed parapneumonic effusion - end date 2/15/2024.    - ID following, appreciate assistance. Reconsult following thora results.   - Bcx  1/11: Staph, probable contaminant  - Bcx1/12: No growth  - Rapid blood PCR: staph epi  - Sputum Cx 1/11: klebsiella & e. Coli  - R pleural fluid Cx 1/17: No growth (fungal pending still, neg KOH)  - SBO type episode 1/07 requiring NGT decompression, which resolved with conservative management   - GI bleed 1/23/2024 - heparin held, 2uPRBC, 1uFFP, heparin reversed. GI consulted                       - EGD 1/24 - could not visualize stomach much 2/2 food residue; EGD aborted                       - 2u PRBC overnight 1/25 for Hgb 6.3, slow downtrend. Repeat EGD 1/26 demonstrates non-bleeding ulcers  - clear liquid diet, novasource renal BID; marinol for appetite   - Bowel regimen, PPI  - Nephrology following, appreciate assistance. CRRT 1/17 overnight. HD trial 1/19 passed but requiring midodrine, 1.5 L LR, metoprolol for RVR. HD 1/26           - LUE AVF created 10.2023, US 1/22/2023 with good flow metrics, okay to use per vascular   - Oxybutinin for bladder spasms   - MM pain control as needed  - Continue PT/OT, mobilization, likely post hospital placement

## 2024-01-27 NOTE — PLAN OF CARE
Patient resting in bed with caretaker at bedside, no complaints of pain, edematous BLE and BUE, had a bowel movement today, call light in reach.

## 2024-01-27 NOTE — SUBJECTIVE & OBJECTIVE
Interval History: Patient seen and examined. Complaints of new cough this morning, unproductive. EGD with non-bleeding ulcers. H/H remains stable over last 24hrs.     Medications:  Continuous Infusions:  Scheduled Meds:   cefpodoxime  200 mg Oral QHS    droNABinol  5 mg Oral BID    epoetin noble (PROCRIT) injection  4,000 Units Intravenous Every Mon, Wed, Fri    erythromycin ethylsuccinate  200 mg Oral QID (WM & HS)    fluconazole  200 mg Oral QHS    insulin aspart U-100  2 Units Subcutaneous TIDWM    insulin detemir U-100  6 Units Subcutaneous Daily    LIDOcaine  1 patch Transdermal Q24H    metoprolol tartrate  12.5 mg Oral BID    metroNIDAZOLE  500 mg Oral Q12H    midodrine  10 mg Oral BID WM    oxybutynin  5 mg Oral TID    pantoprazole  40 mg Oral BID AC    polyethylene glycol  17 g Oral Daily    senna-docusate 8.6-50 mg  1 tablet Oral Daily    sodium chloride 0.9%  10 mL Intravenous Q6H    venlafaxine  37.5 mg Oral Daily     PRN Meds:sodium chloride 0.9%, acetaminophen, albuterol sulfate, bisacodyL, dextrose 10%, dextrose 10%, glucagon (human recombinant), glucose, glucose, heparin (porcine), hydrALAZINE, insulin aspart U-100, midodrine, ondansetron, sodium chloride 0.9%, Flushing PICC/Midline Protocol **AND** sodium chloride 0.9% **AND** sodium chloride 0.9%     Review of patient's allergies indicates:   Allergen Reactions    Crestor [rosuvastatin] Swelling    Gluten protein      Objective:     Vital Signs (Most Recent):  Temp: 98.6 °F (37 °C) (01/27/24 0818)  Pulse: 65 (01/27/24 0818)  Resp: 17 (01/27/24 0818)  BP: 135/67 (01/27/24 0818)  SpO2: (!) 93 % (01/27/24 0818) Vital Signs (24h Range):  Temp:  [97.4 °F (36.3 °C)-98.6 °F (37 °C)] 98.6 °F (37 °C)  Pulse:  [61-79] 65  Resp:  [14-30] 17  SpO2:  [93 %-100 %] 93 %  BP: (113-200)/(55-86) 135/67     Weight: 67.6 kg (149 lb)  Body mass index is 24.79 kg/m².    Intake/Output - Last 3 Shifts         01/25 0700 01/26 0659 01/26 0700 01/27 0659 01/27 0700 01/28  0659    P.O. 717 240     I.V. (mL/kg) 150 (2.2) 10 (0.1)     Blood 972.9      IV Piggyback  400     Total Intake(mL/kg) 1839.9 (27.2) 650 (9.6)     Urine (mL/kg/hr) 0 (0)      Other  1600     Stool 0 0     Chest Tube 60 0     Total Output 60 1600     Net +1779.9 -950            Stool Occurrence 2 x 1 x 0 x             Physical Exam  Vitals and nursing note reviewed.   Constitutional:       Appearance: Normal appearance.   HENT:      Head: Normocephalic and atraumatic.      Nose: Nose normal.   Cardiovascular:      Rate and Rhythm: Normal rate and regular rhythm.      Pulses: Normal pulses.   Pulmonary:      Effort: Pulmonary effort is normal. No respiratory distress.      Comments: Right posterior chest incision c/d/I    Right chest permacath    Right Chest tube, 10 fr, serosanguinous output, waterseal   Abdominal:      General: Abdomen is flat. There is no distension.      Palpations: Abdomen is soft.      Tenderness: There is no abdominal tenderness.      Comments: Soft, nontender abdomen   Musculoskeletal:      Right lower leg: Edema present.      Left lower leg: Edema present.   Skin:     General: Skin is warm and dry.      Coloration: Skin is pale.      Comments: Groin soft, dressings c/d/i   Neurological:      General: No focal deficit present.      Mental Status: She is alert.          Significant Labs:  I have reviewed all pertinent lab results within the past 24 hours.  CBC:   Recent Labs   Lab 01/27/24  0452   WBC 9.26   RBC 3.32*   HGB 9.6*   HCT 30.1*   *   MCV 91   MCH 28.9   MCHC 31.9*     CMP:   Recent Labs   Lab 01/27/24  0452   GLU 95   CALCIUM 8.5*   ALBUMIN 2.1*   PROT 4.9*   *   K 4.3   CO2 25      BUN 43*   CREATININE 3.3*   ALKPHOS 99   ALT 5*   AST 16   BILITOT 0.6       Significant Diagnostics:  I have reviewed all pertinent imaging results/findings within the past 24 hours.

## 2024-01-27 NOTE — PT/OT/SLP PROGRESS
Occupational Therapy   Treatment    Name: Lily Green  MRN: [id number]  Admitting Diagnosis:  Malignant carcinoid tumor of bronchus  1 Day Post-Op    Recommendations:     Discharge Recommendations: Moderate Intensity Therapy  Discharge Equipment Recommendations:   (TBD closer to d/c)  Barriers to discharge:       Assessment:     Lily Green is a 73 y.o. female with a medical diagnosis of Malignant carcinoid tumor of bronchus.  She presents with Performance deficits affecting function are weakness, impaired endurance, impaired self care skills, impaired functional mobility, gait instability, impaired cardiopulmonary response to activity, impaired balance, decreased safety awareness.   Pt fatigued from previous PT session an hour prior, but agreeable to participate in seated ADLs this date. Pt performed oral care, facial hygiene, and brushing hair seated UIC with Min A. Pt will continue to benefit from skilled OT services to address impairments listed above to maximize independence with ADLs and functional mobility to ensure safe return to PLOF.     Rehab Prognosis:  Good; patient would benefit from acute skilled OT services to address these deficits and reach maximum level of function.       Plan:     Patient to be seen 4 x/week to address the above listed problems via self-care/home management, therapeutic activities, therapeutic exercises, neuromuscular re-education  Plan of Care Expires: 02/02/24  Plan of Care Reviewed with: patient, family    Subjective     Chief Complaint: SOB  Patient/Family Comments/goals: family present in room  Pain/Comfort:  Pain Rating 1: 0/10  Pain Rating Post-Intervention 1: 0/10    Objective:     Communicated with: RN prior to session.  Patient found up in chair with blood pressure cuff, telemetry, pulse ox (continuous), chest tube upon OT entry to room.    General Precautions: Standard, fall    Orthopedic Precautions:N/A  Braces: N/A  Respiratory Status: Nasal  cannula    Occupational Performance:     Bed Mobility:    Not performed     Functional Mobility/Transfers:  Not performed    Activities of Daily Living:  Grooming: supervision and minimum assistance    Min A for combing back of hair   Min A for rinsing during oral care, pt using personal electric toothbrush  SPV for facial hygiene with washcloth     AMPAC 6 Click ADL: 12    Treatment & Education:  Pt educated on   Role of OT and OT POC  Importance of sitting UIC a couple of hours a day   Importance of OOB activities to increase endurance and tolerance for increased participation in daily ADLs    Utilizing the call bell to request for assistance with all functional mobility to ensure safety during hospital stay.    Pt verbalized understanding and all questions were addressed within the scope of OT.       Patient left up in chair with all lines intact, call button in reach, and family present    GOALS:   Multidisciplinary Problems       Occupational Therapy Goals          Problem: Occupational Therapy    Goal Priority Disciplines Outcome Interventions   Occupational Therapy Goal     OT, PT/OT Ongoing, Progressing    Description: Goals to be met by: 02/2/24     Patient will increase functional independence with ADLs by performing:    UE Dressing with Supervision.  Grooming while standing at sink with Modified [place].  Toileting from toilet with Modified [place] for hygiene and clothing management.   Supine to sit with Supervision.  Toilet transfer to toilet with Supervision.                         Time Tracking:     OT Date of Treatment: 01/27/24  OT Start Time: 0945  OT Stop Time: 1000  OT Total Time (min): 15 min    Billable Minutes:Self Care/Home Management 15    OT/IRCHARD: OT          1/27/2024   Attending Attestation (For Attendings USE Only)...

## 2024-01-27 NOTE — CARE UPDATE
Care Update:     No acute events overnight. Patient in room 1051/1051 A. Blood glucose stable on current inpatient regimen.No hypoglycemia noted over the past 24-hours. Endocrine will continue to follow and manage insulin orders inpatient.  1 Day Post-Op    Steroid use- None.   Renal function-   Lab Results   Component Value Date    CREATININE 3.3 (H) 01/27/2024        Diet clear liquid Low Potassium     POCT Glucose   Date Value Ref Range Status   01/27/2024 119 (H) 70 - 110 mg/dL Final   01/26/2024 139 (H) 70 - 110 mg/dL Final   01/26/2024 86 70 - 110 mg/dL Final   01/26/2024 133 (H) 70 - 110 mg/dL Final   01/25/2024 145 (H) 70 - 110 mg/dL Final   01/25/2024 191 (H) 70 - 110 mg/dL Final   01/25/2024 154 (H) 70 - 110 mg/dL Final   01/25/2024 129 (H) 70 - 110 mg/dL Final   01/24/2024 122 (H) 70 - 110 mg/dL Final   01/24/2024 133 (H) 70 - 110 mg/dL Final   01/24/2024 170 (H) 70 - 110 mg/dL Final     Lab Results   Component Value Date    HGBA1C 4.9 11/15/2023         Home Medications:   LDC SSI   Blood sugar 150 to 200, + 1 unit  Blood sugar 201 to 250, + 2 units  Blood sugar 251 to 300, + 3 units  Blood sugar 301 to 350, + 4 units   Blood sugar greater than 350, + 5 units    Endocrine  Type 2 diabetes mellitus with diabetic polyneuropathy, without long-term current use of insulin  BG goal: 140-180     - Levemir 6 units daily    - Novolog 2 units with meals.   - Novolog Mod dose correction with ISF 25 starting at 150 prn   - POCT Glucose before meals and at bedtime   - Hypoglycemia protocol in place      ** Please notify Endocrine for any change and/or advance in diet**  ** Please call Endocrine for any BG related issues **     Discharge Planning:   TBD. Please notify endocrinology prior to discharge.    Pranav Perry DNP, FNP-C  Department of Endocrinology  Inpatient Glycemic Management

## 2024-01-27 NOTE — PT/OT/SLP PROGRESS
Physical Therapy Treatment    Patient Name:  Lily Green   MRN:  [id number]    Recommendations:     Discharge Recommendations: Moderate Intensity Therapy  Discharge Equipment Recommendations:  (TBD closer to d/c)  Barriers to discharge: Decreased caregiver support    Assessment:     Lily Green is a 73 y.o. female admitted with a medical diagnosis of Malignant carcinoid tumor of bronchus.  She presents with the following impairments/functional limitations: weakness, impaired balance, decreased safety awareness, impaired endurance, impaired functional mobility, gait instability, decreased lower extremity function .pt with good effort however Pt limited due to fatigue and SOB. Pt would continue to benefit from skilled PT to address overall functional mobility, goals and to return to functional baseline.  Goals remain appropriate     Rehab Prognosis: Good; patient would benefit from acute skilled PT services to address these deficits and reach maximum level of function.    Recent Surgery: Procedure(s) (LRB):  EGD (ESOPHAGOGASTRODUODENOSCOPY) (N/A) 1 Day Post-Op    Plan:     During this hospitalization, patient to be seen 4 x/week to address the identified rehab impairments via gait training, therapeutic activities, therapeutic exercises, neuromuscular re-education and progress toward the following goals:    Plan of Care Expires:  02/09/24    Subjective     Chief Complaint: SOB and fatigue  Patient/Family Comments/goals: what are we planning on doing?  Pain/Comfort:  Pain Rating 1: 0/10  Pain Rating Post-Intervention 1: 0/10      Objective:     Communicated with RN prior to session.  Patient found HOB elevated with chest tube upon PT entry to room.     General Precautions: Standard, fall  Orthopedic Precautions: N/A  Braces: N/A  Respiratory Status: Nasal cannula, flow 2 L/min     Functional Mobility:  Bed Mobility:   pt needed verbal cues for hand placement and sequencing for functional mobility.    Rolling right:  maximal assistance  Supine to Sit: maximal assistance     Transfers:     Sit to Stand:  minimum assistance with RW. Pt stood x 2 reps.      Gait: pt received gait training 4 ft with min assist with RW. Pt limited due to fatigue and SOB.       AM-PAC 6 CLICK MOBILITY  Turning over in bed (including adjusting bedclothes, sheets and blankets)?: 2  Sitting down on and standing up from a chair with arms (e.g., wheelchair, bedside commode, etc.): 3  Moving from lying on back to sitting on the side of the bed?: 2  Moving to and from a bed to a chair (including a wheelchair)?: 3  Need to walk in hospital room?: 2  Climbing 3-5 steps with a railing?: 1  Basic Mobility Total Score: 13       Treatment & Education:  Therapist provided instruction and educated of  patient on progress, safety,d/c,PT POC,   proper body mechanics, energy conservation, and fall prevention strategies during tasks listed above, on the effects of prolonged immobility and the importance of performing OOB activity and exercises to promote healing and reduce recovery time     Updated white board with appropriate PT mobility information for medical team notification   Donned an extra gown   Call nursing/pct to transfer to chair/use bathroom. Pt stated understanding  Bedside table in front of patient and area set up for function, convenience, and safety. RN aware of patient's mobility needs and status. Questions/concerns addressed within PTA scope of practice; patient  with no further questions. Time was provided for active listening, discussion of health disposition, and discussion of safe discharge. Pt?verbalized?agreement .    Patient left up in chair with all lines intact, call button in reach, nsg notified, and family present..    GOALS:   Multidisciplinary Problems       Physical Therapy Goals          Problem: Physical Therapy    Goal Priority Disciplines Outcome Goal Variances Interventions   Physical Therapy Goal     PT, PT/OT  Ongoing, Progressing     Description: PT goals until 2/9/24    1. Pt supine to sit with supervision-not met  2. Pt sit to supine with supervision-not met  3. Pt sit to stand with AD if needed with supervision-not met  4. Pt to perform gait 150 ft with AD if needed with supervision.-not met  5. Pt to go up/down curb step with AD if needed with CGA.-not met                         Time Tracking:     PT Received On: 01/27/24  PT Start Time: 0814     PT Stop Time: 0840  PT Total Time (min): 26 min     Billable Minutes: Gait Training 15 and Therapeutic Activity 11    Treatment Type: Treatment  PT/PTA: PTA     Number of PTA visits since last PT visit: 1 01/27/2024

## 2024-01-28 LAB
ALBUMIN SERPL BCP-MCNC: 2.1 G/DL (ref 3.5–5.2)
ALP SERPL-CCNC: 96 U/L (ref 55–135)
ALT SERPL W/O P-5'-P-CCNC: 6 U/L (ref 10–44)
ANION GAP SERPL CALC-SCNC: 8 MMOL/L (ref 8–16)
AST SERPL-CCNC: 14 U/L (ref 10–40)
BASOPHILS # BLD AUTO: 0.03 K/UL (ref 0–0.2)
BASOPHILS NFR BLD: 0.3 % (ref 0–1.9)
BILIRUB SERPL-MCNC: 0.7 MG/DL (ref 0.1–1)
BUN SERPL-MCNC: 51 MG/DL (ref 8–23)
CALCIUM SERPL-MCNC: 8.4 MG/DL (ref 8.7–10.5)
CHLORIDE SERPL-SCNC: 104 MMOL/L (ref 95–110)
CO2 SERPL-SCNC: 23 MMOL/L (ref 23–29)
CREAT SERPL-MCNC: 3.8 MG/DL (ref 0.5–1.4)
DIFFERENTIAL METHOD BLD: ABNORMAL
EOSINOPHIL # BLD AUTO: 0.1 K/UL (ref 0–0.5)
EOSINOPHIL NFR BLD: 0.5 % (ref 0–8)
ERYTHROCYTE [DISTWIDTH] IN BLOOD BY AUTOMATED COUNT: 26.5 % (ref 11.5–14.5)
EST. GFR  (NO RACE VARIABLE): 12 ML/MIN/1.73 M^2
GLUCOSE SERPL-MCNC: 98 MG/DL (ref 70–110)
HCT VFR BLD AUTO: 29.8 % (ref 37–48.5)
HGB BLD-MCNC: 9.4 G/DL (ref 12–16)
IMM GRANULOCYTES # BLD AUTO: 0.09 K/UL (ref 0–0.04)
IMM GRANULOCYTES NFR BLD AUTO: 1 % (ref 0–0.5)
LYMPHOCYTES # BLD AUTO: 0.9 K/UL (ref 1–4.8)
LYMPHOCYTES NFR BLD: 9.6 % (ref 18–48)
MAGNESIUM SERPL-MCNC: 1.9 MG/DL (ref 1.6–2.6)
MCH RBC QN AUTO: 28.9 PG (ref 27–31)
MCHC RBC AUTO-ENTMCNC: 31.5 G/DL (ref 32–36)
MCV RBC AUTO: 92 FL (ref 82–98)
MONOCYTES # BLD AUTO: 1.2 K/UL (ref 0.3–1)
MONOCYTES NFR BLD: 13 % (ref 4–15)
NEUTROPHILS # BLD AUTO: 7 K/UL (ref 1.8–7.7)
NEUTROPHILS NFR BLD: 75.6 % (ref 38–73)
NRBC BLD-RTO: 0 /100 WBC
PHOSPHATE SERPL-MCNC: 3.8 MG/DL (ref 2.7–4.5)
PLATELET # BLD AUTO: 142 K/UL (ref 150–450)
PMV BLD AUTO: 9.7 FL (ref 9.2–12.9)
POCT GLUCOSE: 139 MG/DL (ref 70–110)
POCT GLUCOSE: 81 MG/DL (ref 70–110)
POCT GLUCOSE: 94 MG/DL (ref 70–110)
POCT GLUCOSE: 95 MG/DL (ref 70–110)
POTASSIUM SERPL-SCNC: 4.2 MMOL/L (ref 3.5–5.1)
PROT SERPL-MCNC: 5 G/DL (ref 6–8.4)
RBC # BLD AUTO: 3.25 M/UL (ref 4–5.4)
SODIUM SERPL-SCNC: 135 MMOL/L (ref 136–145)
WBC # BLD AUTO: 9.29 K/UL (ref 3.9–12.7)

## 2024-01-28 PROCEDURE — 25000003 PHARM REV CODE 250

## 2024-01-28 PROCEDURE — 63600175 PHARM REV CODE 636 W HCPCS

## 2024-01-28 PROCEDURE — 94664 DEMO&/EVAL PT USE INHALER: CPT

## 2024-01-28 PROCEDURE — 84100 ASSAY OF PHOSPHORUS: CPT

## 2024-01-28 PROCEDURE — A4216 STERILE WATER/SALINE, 10 ML: HCPCS | Performed by: STUDENT IN AN ORGANIZED HEALTH CARE EDUCATION/TRAINING PROGRAM

## 2024-01-28 PROCEDURE — 25000003 PHARM REV CODE 250: Performed by: STUDENT IN AN ORGANIZED HEALTH CARE EDUCATION/TRAINING PROGRAM

## 2024-01-28 PROCEDURE — 94640 AIRWAY INHALATION TREATMENT: CPT

## 2024-01-28 PROCEDURE — 20600001 HC STEP DOWN PRIVATE ROOM

## 2024-01-28 PROCEDURE — 94761 N-INVAS EAR/PLS OXIMETRY MLT: CPT

## 2024-01-28 PROCEDURE — 25000242 PHARM REV CODE 250 ALT 637 W/ HCPCS: Performed by: STUDENT IN AN ORGANIZED HEALTH CARE EDUCATION/TRAINING PROGRAM

## 2024-01-28 PROCEDURE — 99900031 HC PATIENT EDUCATION (STAT)

## 2024-01-28 PROCEDURE — 83735 ASSAY OF MAGNESIUM: CPT

## 2024-01-28 PROCEDURE — 99900035 HC TECH TIME PER 15 MIN (STAT)

## 2024-01-28 PROCEDURE — 85025 COMPLETE CBC W/AUTO DIFF WBC: CPT | Performed by: SURGERY

## 2024-01-28 PROCEDURE — 27000221 HC OXYGEN, UP TO 24 HOURS

## 2024-01-28 PROCEDURE — 25000003 PHARM REV CODE 250: Performed by: SURGERY

## 2024-01-28 PROCEDURE — 25000242 PHARM REV CODE 250 ALT 637 W/ HCPCS

## 2024-01-28 PROCEDURE — 80053 COMPREHEN METABOLIC PANEL: CPT | Performed by: STUDENT IN AN ORGANIZED HEALTH CARE EDUCATION/TRAINING PROGRAM

## 2024-01-28 RX ADMIN — ALBUTEROL SULFATE 2.5 MG: 2.5 SOLUTION RESPIRATORY (INHALATION) at 09:01

## 2024-01-28 RX ADMIN — DRONABINOL 5 MG: 2.5 CAPSULE ORAL at 09:01

## 2024-01-28 RX ADMIN — METRONIDAZOLE 500 MG: 500 TABLET ORAL at 08:01

## 2024-01-28 RX ADMIN — ALBUTEROL SULFATE 2.5 MG: 2.5 SOLUTION RESPIRATORY (INHALATION) at 04:01

## 2024-01-28 RX ADMIN — Medication 10 ML: at 05:01

## 2024-01-28 RX ADMIN — ERYTHROMYCIN ETHYLSUCCINATE 200 MG: 200 SUSPENSION ORAL at 08:01

## 2024-01-28 RX ADMIN — MIDODRINE HYDROCHLORIDE 10 MG: 5 TABLET ORAL at 04:01

## 2024-01-28 RX ADMIN — CEFPODOXIME PROXETIL 200 MG: 200 TABLET, FILM COATED ORAL at 09:01

## 2024-01-28 RX ADMIN — OXYBUTYNIN CHLORIDE 5 MG: 5 TABLET ORAL at 08:01

## 2024-01-28 RX ADMIN — SENNOSIDES AND DOCUSATE SODIUM 1 TABLET: 8.6; 5 TABLET ORAL at 08:01

## 2024-01-28 RX ADMIN — OXYBUTYNIN CHLORIDE 5 MG: 5 TABLET ORAL at 02:01

## 2024-01-28 RX ADMIN — METRONIDAZOLE 500 MG: 500 TABLET ORAL at 09:01

## 2024-01-28 RX ADMIN — ACETYLCYSTEINE 4 ML: 100 INHALANT RESPIRATORY (INHALATION) at 09:01

## 2024-01-28 RX ADMIN — MIDODRINE HYDROCHLORIDE 10 MG: 5 TABLET ORAL at 08:01

## 2024-01-28 RX ADMIN — INSULIN ASPART 2 UNITS: 100 INJECTION, SOLUTION INTRAVENOUS; SUBCUTANEOUS at 08:01

## 2024-01-28 RX ADMIN — ERYTHROMYCIN ETHYLSUCCINATE 200 MG: 200 SUSPENSION ORAL at 09:01

## 2024-01-28 RX ADMIN — INSULIN ASPART 2 UNITS: 100 INJECTION, SOLUTION INTRAVENOUS; SUBCUTANEOUS at 12:01

## 2024-01-28 RX ADMIN — LIDOCAINE 5% 1 PATCH: 700 PATCH TOPICAL at 08:01

## 2024-01-28 RX ADMIN — METOPROLOL TARTRATE 12.5 MG: 25 TABLET, FILM COATED ORAL at 09:01

## 2024-01-28 RX ADMIN — OXYBUTYNIN CHLORIDE 5 MG: 5 TABLET ORAL at 09:01

## 2024-01-28 RX ADMIN — Medication 10 ML: at 06:01

## 2024-01-28 RX ADMIN — FLUCONAZOLE 200 MG: 200 TABLET ORAL at 09:01

## 2024-01-28 RX ADMIN — Medication 10 ML: at 12:01

## 2024-01-28 RX ADMIN — PANTOPRAZOLE SODIUM 40 MG: 40 TABLET, DELAYED RELEASE ORAL at 04:01

## 2024-01-28 RX ADMIN — INSULIN ASPART 2 UNITS: 100 INJECTION, SOLUTION INTRAVENOUS; SUBCUTANEOUS at 04:01

## 2024-01-28 RX ADMIN — ONDANSETRON 8 MG: 2 INJECTION INTRAMUSCULAR; INTRAVENOUS at 03:01

## 2024-01-28 RX ADMIN — DRONABINOL 5 MG: 2.5 CAPSULE ORAL at 08:01

## 2024-01-28 RX ADMIN — ERYTHROMYCIN ETHYLSUCCINATE 200 MG: 200 SUSPENSION ORAL at 04:01

## 2024-01-28 RX ADMIN — ACETYLCYSTEINE 4 ML: 100 INHALANT RESPIRATORY (INHALATION) at 04:01

## 2024-01-28 RX ADMIN — METOPROLOL TARTRATE 12.5 MG: 25 TABLET, FILM COATED ORAL at 08:01

## 2024-01-28 RX ADMIN — ERYTHROMYCIN ETHYLSUCCINATE 200 MG: 200 SUSPENSION ORAL at 12:01

## 2024-01-28 RX ADMIN — PANTOPRAZOLE SODIUM 40 MG: 40 TABLET, DELAYED RELEASE ORAL at 05:01

## 2024-01-28 RX ADMIN — POLYETHYLENE GLYCOL 3350 17 G: 17 POWDER, FOR SOLUTION ORAL at 08:01

## 2024-01-28 RX ADMIN — VENLAFAXINE 37.5 MG: 37.5 TABLET ORAL at 08:01

## 2024-01-28 NOTE — SUBJECTIVE & OBJECTIVE
Interval History: Patient seen and examined this morning. No acute events overnight. Subjective improvement in cough. H/H remains stable-- labs timed to daily. No new complaints.     Medications:  Continuous Infusions:  Scheduled Meds:   [START ON 1/29/2024] sodium chloride 0.9%   Intravenous Once    acetylcysteine 100 mg/ml (10%)  4 mL Nebulization TID WAKE    cefpodoxime  200 mg Oral QHS    droNABinol  5 mg Oral BID    epoetin noble (PROCRIT) injection  4,000 Units Intravenous Every Mon, Wed, Fri    erythromycin ethylsuccinate  200 mg Oral QID (WM & HS)    fluconazole  200 mg Oral QHS    insulin aspart U-100  2 Units Subcutaneous TIDWM    insulin detemir U-100  6 Units Subcutaneous Daily    LIDOcaine  1 patch Transdermal Q24H    metoprolol tartrate  12.5 mg Oral BID    metroNIDAZOLE  500 mg Oral Q12H    midodrine  10 mg Oral BID WM    oxybutynin  5 mg Oral TID    pantoprazole  40 mg Oral BID AC    polyethylene glycol  17 g Oral Daily    senna-docusate 8.6-50 mg  1 tablet Oral Daily    sodium chloride 0.9%  10 mL Intravenous Q6H    venlafaxine  37.5 mg Oral Daily     PRN Meds:sodium chloride 0.9%, acetaminophen, albuterol sulfate, bisacodyL, dextrose 10%, dextrose 10%, glucagon (human recombinant), glucose, glucose, heparin (porcine), hydrALAZINE, insulin aspart U-100, midodrine, ondansetron, sodium chloride 0.9%, Flushing PICC/Midline Protocol **AND** sodium chloride 0.9% **AND** sodium chloride 0.9%     Review of patient's allergies indicates:   Allergen Reactions    Crestor [rosuvastatin] Swelling    Gluten protein      Objective:     Vital Signs (Most Recent):  Temp: 97.9 °F (36.6 °C) (01/28/24 0726)  Pulse: 69 (01/28/24 0726)  Resp: 18 (01/28/24 0726)  BP: (!) 120/58 (01/28/24 0726)  SpO2: 97 % (01/28/24 0726) Vital Signs (24h Range):  Temp:  [97.5 °F (36.4 °C)-98.6 °F (37 °C)] 97.9 °F (36.6 °C)  Pulse:  [6-96] 69  Resp:  [16-19] 18  SpO2:  [93 %-98 %] 97 %  BP: (103-135)/(53-76) 120/58     Weight: 67.6 kg (149  lb)  Body mass index is 24.79 kg/m².    Intake/Output - Last 3 Shifts         01/26 0700 01/27 0659 01/27 0700 01/28 0659 01/28 0700 01/29 0659    P.O. 240      I.V. (mL/kg) 10 (0.1)      Blood       IV Piggyback 400      Total Intake(mL/kg) 650 (9.6)      Urine (mL/kg/hr)       Other 1600      Stool 0      Chest Tube 0      Total Output 1600      Net -950             Stool Occurrence 1 x 0 x 0 x             Physical Exam  Vitals and nursing note reviewed.   Constitutional:       Appearance: Normal appearance.   HENT:      Head: Normocephalic and atraumatic.      Nose: Nose normal.   Cardiovascular:      Rate and Rhythm: Normal rate and regular rhythm.      Pulses: Normal pulses.   Pulmonary:      Effort: Pulmonary effort is normal. No respiratory distress.      Comments: Right posterior chest incision c/d/I  Right chest permacath    Abdominal:      General: Abdomen is flat. There is no distension.      Palpations: Abdomen is soft.      Tenderness: There is no abdominal tenderness.      Comments: Soft, nontender abdomen   Musculoskeletal:      Right lower leg: Edema present.      Left lower leg: Edema present.   Skin:     General: Skin is warm and dry.      Coloration: Skin is pale.      Comments: Groin soft, dressings c/d/i   Neurological:      General: No focal deficit present.      Mental Status: She is alert.          Significant Labs:  I have reviewed all pertinent lab results within the past 24 hours.  CBC:   Recent Labs   Lab 01/28/24  0559   WBC 9.29   RBC 3.25*   HGB 9.4*   HCT 29.8*   *   MCV 92   MCH 28.9   MCHC 31.5*     CMP:   Recent Labs   Lab 01/28/24  0559   GLU 98   CALCIUM 8.4*   ALBUMIN 2.1*   PROT 5.0*   *   K 4.2   CO2 23      BUN 51*   CREATININE 3.8*   ALKPHOS 96   ALT 6*   AST 14   BILITOT 0.7       Significant Diagnostics:  I have reviewed all pertinent imaging results/findings within the past 24 hours.

## 2024-01-28 NOTE — PROGRESS NOTES
[facility]  General Surgery  Progress Note    Subjective:     History of Present Illness:  No notes on file    Post-Op Info:  Procedure(s) (LRB):  EGD (ESOPHAGOGASTRODUODENOSCOPY) (N/A)   2 Days Post-Op     Interval History: Patient seen and examined this morning. No acute events overnight. Subjective improvement in cough. H/H remains stable-- labs timed to daily. No new complaints.     Medications:  Continuous Infusions:  Scheduled Meds:   [START ON 1/29/2024] sodium chloride 0.9%   Intravenous Once    acetylcysteine 100 mg/ml (10%)  4 mL Nebulization TID WAKE    cefpodoxime  200 mg Oral QHS    droNABinol  5 mg Oral BID    epoetin noble (PROCRIT) injection  4,000 Units Intravenous Every Mon, Wed, Fri    erythromycin ethylsuccinate  200 mg Oral QID (WM & HS)    fluconazole  200 mg Oral QHS    insulin aspart U-100  2 Units Subcutaneous TIDWM    insulin detemir U-100  6 Units Subcutaneous Daily    LIDOcaine  1 patch Transdermal Q24H    metoprolol tartrate  12.5 mg Oral BID    metroNIDAZOLE  500 mg Oral Q12H    midodrine  10 mg Oral BID WM    oxybutynin  5 mg Oral TID    pantoprazole  40 mg Oral BID AC    polyethylene glycol  17 g Oral Daily    senna-docusate 8.6-50 mg  1 tablet Oral Daily    sodium chloride 0.9%  10 mL Intravenous Q6H    venlafaxine  37.5 mg Oral Daily     PRN Meds:sodium chloride 0.9%, acetaminophen, albuterol sulfate, bisacodyL, dextrose 10%, dextrose 10%, glucagon (human recombinant), glucose, glucose, heparin (porcine), hydrALAZINE, insulin aspart U-100, midodrine, ondansetron, sodium chloride 0.9%, Flushing PICC/Midline Protocol **AND** sodium chloride 0.9% **AND** sodium chloride 0.9%     Review of patient's allergies indicates:   Allergen Reactions    Crestor [rosuvastatin] Swelling    Gluten protein      Objective:     Vital Signs (Most Recent):  Temp: 97.9 °F (36.6 °C) (01/28/24 0726)  Pulse: 69 (01/28/24 0726)  Resp: 18 (01/28/24 0726)  BP: (!) 120/58 (01/28/24 0726)  SpO2: 97 %  (01/28/24 0726) Vital Signs (24h Range):  Temp:  [97.5 °F (36.4 °C)-98.6 °F (37 °C)] 97.9 °F (36.6 °C)  Pulse:  [6-96] 69  Resp:  [16-19] 18  SpO2:  [93 %-98 %] 97 %  BP: (103-135)/(53-76) 120/58     Weight: 67.6 kg (149 lb)  Body mass index is 24.79 kg/m².    Intake/Output - Last 3 Shifts         01/26 0700 01/27 0659 01/27 0700 01/28 0659 01/28 0700 01/29 0659    P.O. 240      I.V. (mL/kg) 10 (0.1)      Blood       IV Piggyback 400      Total Intake(mL/kg) 650 (9.6)      Urine (mL/kg/hr)       Other 1600      Stool 0      Chest Tube 0      Total Output 1600      Net -950             Stool Occurrence 1 x 0 x 0 x             Physical Exam  Vitals and nursing note reviewed.   Constitutional:       Appearance: Normal appearance.   HENT:      Head: Normocephalic and atraumatic.      Nose: Nose normal.   Cardiovascular:      Rate and Rhythm: Normal rate and regular rhythm.      Pulses: Normal pulses.   Pulmonary:      Effort: Pulmonary effort is normal. No respiratory distress.      Comments: Right posterior chest incision c/d/I  Right chest permacath    Abdominal:      General: Abdomen is flat. There is no distension.      Palpations: Abdomen is soft.      Tenderness: There is no abdominal tenderness.      Comments: Soft, nontender abdomen   Musculoskeletal:      Right lower leg: Edema present.      Left lower leg: Edema present.   Skin:     General: Skin is warm and dry.      Coloration: Skin is pale.      Comments: Groin soft, dressings c/d/i   Neurological:      General: No focal deficit present.      Mental Status: She is alert.          Significant Labs:  I have reviewed all pertinent lab results within the past 24 hours.  CBC:   Recent Labs   Lab 01/28/24  0559   WBC 9.29   RBC 3.25*   HGB 9.4*   HCT 29.8*   *   MCV 92   MCH 28.9   MCHC 31.5*     CMP:   Recent Labs   Lab 01/28/24  0559   GLU 98   CALCIUM 8.4*   ALBUMIN 2.1*   PROT 5.0*   *   K 4.2   CO2 23      BUN 51*   CREATININE 3.8*    ALKPHOS 96   ALT 6*   AST 14   BILITOT 0.7       Significant Diagnostics:  I have reviewed all pertinent imaging results/findings within the past 24 hours.  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 Stepped up to the ICU 1/5 for afib RVR which was refractory to medical management and c/b development of tachy-eulogio syndrome now s/p micra placement 1/16 for tachy-eulogio syndrome in the setting of RVR. Did have brief SBO type picture 1/7 which resolved with conservative management.     - s/p R thoracotomy with R lower lobectomy for carcinoid w/ MLND; IPV injury requiring repair. CT removed post operatively.  - Refractory afib RVR requiring ICU step up 1/05, s/p chemical cardioversion initially with recurrence c/b developing tachy-eulogio syndrome    - cards and EP following, appreciate assistance            - AC indicated 2/2 intermittent atrial fib episodes and chadsvasc score of 2     - s/p TVP placement and now micra implantation 1/16    - Heparin gtt resumed 1/22; will need to transition to eliquis likely following CT removal; held 1/23 2/2 GI bleed   - CT chest 1/13 w/ bilateral pleural effusion (R>L), pericardial effusion,   - s/p R chest tube placement with IR 1/17, L chest tube placement 1/18; L CT removed 1/25; likely DC remaining R CT today after HD   - CXR on 1/27 with resolution of apical and hilar consolidation; remains with interstitial infiltrate on right  - Pulmonary toilet: IS, Acapella, Mucomyst daily; CPT ordered  - Infectious work up 2/2 leukocytosis. She remains afebrile. Effusions and compressive atelectasis also likely contributing to leukocytosis.Trended up to 30k 1/16, now downtrending. Zosyn 1/11-1/15; Meropenam 1/16 - 1/20; Plan for  Cefpodoxime and metronidazole to start 1/21 per ID. 4 week abx plan for empiric treatment of presumed parapneumonic effusion - end date 2/15/2024.    - ID following, appreciate assistance. Reconsult following thora results.   - Bcx 1/11: Staph, probable contaminant  - Bcx1/12: No growth  - Rapid blood PCR: staph epi  - Sputum Cx 1/11: klebsiella & e. Coli  - R pleural fluid Cx 1/17: No growth (fungal pending still, neg KOH)  - SBO type episode 1/07 requiring NGT decompression, which resolved with conservative management   - GI bleed 1/23/2024 - heparin held, 2uPRBC, 1uFFP, heparin reversed. GI consulted                       - EGD 1/24 - could not visualize stomach much 2/2 food residue; EGD aborted                       - 2u PRBC overnight 1/25 for Hgb 6.3, slow downtrend. Repeat EGD 1/26 demonstrates non-bleeding ulcers  - clear liquid diet, novasource renal BID; marinol for appetite   - Bowel regimen, PPI  - Nephrology following, appreciate assistance. CRRT 1/17 overnight. HD trial 1/19 passed but requiring midodrine, 1.5 L LR, metoprolol for RVR. HD 1/26           - LUE AVF created 10.2023, US 1/22/2023 with good flow metrics, okay to use per vascular   - Oxybutinin for bladder spasms   - MM pain control as needed  - Continue PT/OT, mobilization, likely post hospital placement; engaged social work        Pranav Vega MD  General Surgery  [facility]

## 2024-01-28 NOTE — CARE UPDATE
Care Update:     No acute events overnight. Patient in room 1051/1051 A. Blood glucose stable on current inpatient regimen.No hypoglycemia noted over the past 24-hours. Endocrine will continue to follow and manage insulin orders inpatient.  2 Days Post-Op    Steroid use- None.   Renal function-   Lab Results   Component Value Date    CREATININE 3.8 (H) 01/28/2024        Diet clear liquid Low Potassium     POCT Glucose   Date Value Ref Range Status   01/27/2024 160 (H) 70 - 110 mg/dL Final   01/27/2024 136 (H) 70 - 110 mg/dL Final   01/27/2024 92 70 - 110 mg/dL Final   01/27/2024 119 (H) 70 - 110 mg/dL Final   01/26/2024 139 (H) 70 - 110 mg/dL Final   01/26/2024 86 70 - 110 mg/dL Final   01/26/2024 133 (H) 70 - 110 mg/dL Final   01/25/2024 145 (H) 70 - 110 mg/dL Final   01/25/2024 191 (H) 70 - 110 mg/dL Final   01/25/2024 154 (H) 70 - 110 mg/dL Final   01/25/2024 129 (H) 70 - 110 mg/dL Final     Lab Results   Component Value Date    HGBA1C 4.9 11/15/2023         Home Medications:   LDC SSI   Blood sugar 150 to 200, + 1 unit  Blood sugar 201 to 250, + 2 units  Blood sugar 251 to 300, + 3 units  Blood sugar 301 to 350, + 4 units   Blood sugar greater than 350, + 5 units    Endocrine  Type 2 diabetes mellitus with diabetic polyneuropathy, without long-term current use of insulin  BG goal: 140-180     - Levemir 6 units daily    - Novolog 2 units with meals.   - Novolog Mod dose correction with ISF 25 starting at 150 prn   - POCT Glucose before meals and at bedtime   - Hypoglycemia protocol in place      ** Please notify Endocrine for any change and/or advance in diet**  ** Please call Endocrine for any BG related issues **     Discharge Planning:   TBD. Please notify endocrinology prior to discharge.    Pranav Perry DNP, FNP-C  Department of Endocrinology  Inpatient Glycemic Management

## 2024-01-28 NOTE — ASSESSMENT & PLAN NOTE
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 Stepped up to the ICU 1/5 for afib RVR which was refractory to medical management and c/b development of tachy-eulogio syndrome now s/p micra placement 1/16 for tachy-eulogio syndrome in the setting of RVR. Did have brief SBO type picture 1/7 which resolved with conservative management.     - s/p R thoracotomy with R lower lobectomy for carcinoid w/ MLND; IPV injury requiring repair. CT removed post operatively.  - Refractory afib RVR requiring ICU step up 1/05, s/p chemical cardioversion initially with recurrence c/b developing tachy-eulogio syndrome    - cards and EP following, appreciate assistance            - AC indicated 2/2 intermittent atrial fib episodes and chadsvasc score of 2     - s/p TVP placement and now micra implantation 1/16    - Heparin gtt resumed 1/22; will need to transition to eliquis likely following CT removal; held 1/23 2/2 GI bleed   - CT chest 1/13 w/ bilateral pleural effusion (R>L), pericardial effusion,   - s/p R chest tube placement with IR 1/17, L chest tube placement 1/18; L CT removed 1/25; likely DC remaining R CT today after HD   - CXR on 1/27 with resolution of apical and hilar consolidation; remains with interstitial infiltrate on right  - Pulmonary toilet: IS, Acapella, Mucomyst daily; CPT ordered  - Infectious work up 2/2 leukocytosis. She remains afebrile. Effusions and compressive atelectasis also likely contributing to leukocytosis.Trended up to 30k 1/16, now downtrending. Zosyn 1/11-1/15; Meropenam 1/16 - 1/20; Plan for Cefpodoxime and metronidazole to start 1/21 per ID. 4 week abx plan for empiric treatment of presumed parapneumonic effusion - end date 2/15/2024.    - ID following, appreciate assistance. Reconsult following thora results.   -  Bcx 1/11: Staph, probable contaminant  - Bcx1/12: No growth  - Rapid blood PCR: staph epi  - Sputum Cx 1/11: klebsiella & e. Coli  - R pleural fluid Cx 1/17: No growth (fungal pending still, neg KOH)  - SBO type episode 1/07 requiring NGT decompression, which resolved with conservative management   - GI bleed 1/23/2024 - heparin held, 2uPRBC, 1uFFP, heparin reversed. GI consulted                       - EGD 1/24 - could not visualize stomach much 2/2 food residue; EGD aborted                       - 2u PRBC overnight 1/25 for Hgb 6.3, slow downtrend. Repeat EGD 1/26 demonstrates non-bleeding ulcers  - clear liquid diet, novasource renal BID; marinol for appetite   - Bowel regimen, PPI  - Nephrology following, appreciate assistance. CRRT 1/17 overnight. HD trial 1/19 passed but requiring midodrine, 1.5 L LR, metoprolol for RVR. HD 1/26           - LUE AVF created 10.2023, US 1/22/2023 with good flow metrics, okay to use per vascular   - Oxybutinin for bladder spasms   - MM pain control as needed  - Continue PT/OT, mobilization, likely post hospital placement; engaged social work

## 2024-01-29 ENCOUNTER — TELEPHONE (OUTPATIENT)
Dept: ENDOSCOPY | Facility: HOSPITAL | Age: 74
End: 2024-01-29
Payer: MEDICARE

## 2024-01-29 LAB
ALBUMIN SERPL BCP-MCNC: 2.1 G/DL (ref 3.5–5.2)
ALP SERPL-CCNC: 98 U/L (ref 55–135)
ALT SERPL W/O P-5'-P-CCNC: 9 U/L (ref 10–44)
ANION GAP SERPL CALC-SCNC: 10 MMOL/L (ref 8–16)
ANISOCYTOSIS BLD QL SMEAR: SLIGHT
AST SERPL-CCNC: 14 U/L (ref 10–40)
BASOPHILS # BLD AUTO: 0.02 K/UL (ref 0–0.2)
BASOPHILS NFR BLD: 0.2 % (ref 0–1.9)
BILIRUB SERPL-MCNC: 0.6 MG/DL (ref 0.1–1)
BUN SERPL-MCNC: 52 MG/DL (ref 8–23)
CALCIUM SERPL-MCNC: 8.5 MG/DL (ref 8.7–10.5)
CHLORIDE SERPL-SCNC: 103 MMOL/L (ref 95–110)
CO2 SERPL-SCNC: 20 MMOL/L (ref 23–29)
CREAT SERPL-MCNC: 4.6 MG/DL (ref 0.5–1.4)
DIFFERENTIAL METHOD BLD: ABNORMAL
EOSINOPHIL # BLD AUTO: 0.1 K/UL (ref 0–0.5)
EOSINOPHIL NFR BLD: 0.6 % (ref 0–8)
ERYTHROCYTE [DISTWIDTH] IN BLOOD BY AUTOMATED COUNT: 26.3 % (ref 11.5–14.5)
EST. GFR  (NO RACE VARIABLE): 9.5 ML/MIN/1.73 M^2
GLUCOSE SERPL-MCNC: 88 MG/DL (ref 70–110)
HCT VFR BLD AUTO: 29.2 % (ref 37–48.5)
HGB BLD-MCNC: 9.2 G/DL (ref 12–16)
HYPOCHROMIA BLD QL SMEAR: ABNORMAL
IMM GRANULOCYTES # BLD AUTO: 0.07 K/UL (ref 0–0.04)
IMM GRANULOCYTES NFR BLD AUTO: 0.8 % (ref 0–0.5)
LYMPHOCYTES # BLD AUTO: 0.8 K/UL (ref 1–4.8)
LYMPHOCYTES NFR BLD: 9.4 % (ref 18–48)
MAGNESIUM SERPL-MCNC: 2 MG/DL (ref 1.6–2.6)
MCH RBC QN AUTO: 29.6 PG (ref 27–31)
MCHC RBC AUTO-ENTMCNC: 31.5 G/DL (ref 32–36)
MCV RBC AUTO: 94 FL (ref 82–98)
MONOCYTES # BLD AUTO: 0.9 K/UL (ref 0.3–1)
MONOCYTES NFR BLD: 10.1 % (ref 4–15)
NEUTROPHILS # BLD AUTO: 6.7 K/UL (ref 1.8–7.7)
NEUTROPHILS NFR BLD: 78.9 % (ref 38–73)
NRBC BLD-RTO: 0 /100 WBC
OVALOCYTES BLD QL SMEAR: ABNORMAL
PHOSPHATE SERPL-MCNC: 4.9 MG/DL (ref 2.7–4.5)
PLATELET # BLD AUTO: 141 K/UL (ref 150–450)
PLATELET BLD QL SMEAR: ABNORMAL
PMV BLD AUTO: 10 FL (ref 9.2–12.9)
POCT GLUCOSE: 109 MG/DL (ref 70–110)
POCT GLUCOSE: 111 MG/DL (ref 70–110)
POCT GLUCOSE: 130 MG/DL (ref 70–110)
POCT GLUCOSE: 272 MG/DL (ref 70–110)
POIKILOCYTOSIS BLD QL SMEAR: SLIGHT
POLYCHROMASIA BLD QL SMEAR: ABNORMAL
POTASSIUM SERPL-SCNC: 4.5 MMOL/L (ref 3.5–5.1)
PROT SERPL-MCNC: 5 G/DL (ref 6–8.4)
RBC # BLD AUTO: 3.11 M/UL (ref 4–5.4)
SODIUM SERPL-SCNC: 133 MMOL/L (ref 136–145)
WBC # BLD AUTO: 8.44 K/UL (ref 3.9–12.7)

## 2024-01-29 PROCEDURE — 80053 COMPREHEN METABOLIC PANEL: CPT | Performed by: STUDENT IN AN ORGANIZED HEALTH CARE EDUCATION/TRAINING PROGRAM

## 2024-01-29 PROCEDURE — 63600175 PHARM REV CODE 636 W HCPCS

## 2024-01-29 PROCEDURE — 94640 AIRWAY INHALATION TREATMENT: CPT

## 2024-01-29 PROCEDURE — 99900035 HC TECH TIME PER 15 MIN (STAT)

## 2024-01-29 PROCEDURE — 63600175 PHARM REV CODE 636 W HCPCS: Performed by: STUDENT IN AN ORGANIZED HEALTH CARE EDUCATION/TRAINING PROGRAM

## 2024-01-29 PROCEDURE — 90935 HEMODIALYSIS ONE EVALUATION: CPT

## 2024-01-29 PROCEDURE — 25000003 PHARM REV CODE 250

## 2024-01-29 PROCEDURE — 85025 COMPLETE CBC W/AUTO DIFF WBC: CPT | Performed by: STUDENT IN AN ORGANIZED HEALTH CARE EDUCATION/TRAINING PROGRAM

## 2024-01-29 PROCEDURE — 63600175 PHARM REV CODE 636 W HCPCS: Mod: JZ | Performed by: NURSE PRACTITIONER

## 2024-01-29 PROCEDURE — 84100 ASSAY OF PHOSPHORUS: CPT

## 2024-01-29 PROCEDURE — 25000242 PHARM REV CODE 250 ALT 637 W/ HCPCS: Performed by: STUDENT IN AN ORGANIZED HEALTH CARE EDUCATION/TRAINING PROGRAM

## 2024-01-29 PROCEDURE — 87338 HPYLORI STOOL AG IA: CPT | Performed by: STUDENT IN AN ORGANIZED HEALTH CARE EDUCATION/TRAINING PROGRAM

## 2024-01-29 PROCEDURE — 83735 ASSAY OF MAGNESIUM: CPT

## 2024-01-29 PROCEDURE — 94761 N-INVAS EAR/PLS OXIMETRY MLT: CPT

## 2024-01-29 PROCEDURE — 27000646 HC AEROBIKA DEVICE

## 2024-01-29 PROCEDURE — 25000003 PHARM REV CODE 250: Performed by: STUDENT IN AN ORGANIZED HEALTH CARE EDUCATION/TRAINING PROGRAM

## 2024-01-29 PROCEDURE — A4216 STERILE WATER/SALINE, 10 ML: HCPCS | Performed by: STUDENT IN AN ORGANIZED HEALTH CARE EDUCATION/TRAINING PROGRAM

## 2024-01-29 PROCEDURE — 25000003 PHARM REV CODE 250: Performed by: NURSE PRACTITIONER

## 2024-01-29 PROCEDURE — 99233 SBSQ HOSP IP/OBS HIGH 50: CPT | Mod: ,,, | Performed by: INTERNAL MEDICINE

## 2024-01-29 PROCEDURE — 20600001 HC STEP DOWN PRIVATE ROOM

## 2024-01-29 PROCEDURE — 94664 DEMO&/EVAL PT USE INHALER: CPT

## 2024-01-29 PROCEDURE — 25000242 PHARM REV CODE 250 ALT 637 W/ HCPCS

## 2024-01-29 PROCEDURE — 27000221 HC OXYGEN, UP TO 24 HOURS

## 2024-01-29 RX ORDER — HEPARIN SODIUM 5000 [USP'U]/ML
5000 INJECTION, SOLUTION INTRAVENOUS; SUBCUTANEOUS EVERY 8 HOURS
Status: DISCONTINUED | OUTPATIENT
Start: 2024-01-29 | End: 2024-02-06 | Stop reason: HOSPADM

## 2024-01-29 RX ADMIN — ACETYLCYSTEINE 4 ML: 100 INHALANT RESPIRATORY (INHALATION) at 02:01

## 2024-01-29 RX ADMIN — Medication 10 ML: at 06:01

## 2024-01-29 RX ADMIN — METOPROLOL TARTRATE 12.5 MG: 25 TABLET, FILM COATED ORAL at 09:01

## 2024-01-29 RX ADMIN — FLUCONAZOLE 200 MG: 200 TABLET ORAL at 09:01

## 2024-01-29 RX ADMIN — ERYTHROPOIETIN 4000 UNITS: 4000 INJECTION, SOLUTION INTRAVENOUS; SUBCUTANEOUS at 11:01

## 2024-01-29 RX ADMIN — ERYTHROMYCIN ETHYLSUCCINATE 200 MG: 200 SUSPENSION ORAL at 09:01

## 2024-01-29 RX ADMIN — ACETYLCYSTEINE 4 ML: 100 INHALANT RESPIRATORY (INHALATION) at 07:01

## 2024-01-29 RX ADMIN — INSULIN ASPART 2 UNITS: 100 INJECTION, SOLUTION INTRAVENOUS; SUBCUTANEOUS at 04:01

## 2024-01-29 RX ADMIN — DRONABINOL 5 MG: 2.5 CAPSULE ORAL at 09:01

## 2024-01-29 RX ADMIN — ONDANSETRON 8 MG: 2 INJECTION INTRAMUSCULAR; INTRAVENOUS at 04:01

## 2024-01-29 RX ADMIN — MIDODRINE HYDROCHLORIDE 10 MG: 5 TABLET ORAL at 04:01

## 2024-01-29 RX ADMIN — Medication 10 ML: at 04:01

## 2024-01-29 RX ADMIN — SODIUM CHLORIDE: 9 INJECTION, SOLUTION INTRAVENOUS at 08:01

## 2024-01-29 RX ADMIN — PANTOPRAZOLE SODIUM 40 MG: 40 TABLET, DELAYED RELEASE ORAL at 05:01

## 2024-01-29 RX ADMIN — HEPARIN SODIUM 5000 UNITS: 5000 INJECTION INTRAVENOUS; SUBCUTANEOUS at 09:01

## 2024-01-29 RX ADMIN — ACETAMINOPHEN 650 MG: 325 TABLET ORAL at 09:01

## 2024-01-29 RX ADMIN — CEFPODOXIME PROXETIL 200 MG: 200 TABLET, FILM COATED ORAL at 09:01

## 2024-01-29 RX ADMIN — METRONIDAZOLE 500 MG: 500 TABLET ORAL at 09:01

## 2024-01-29 RX ADMIN — Medication 10 ML: at 12:01

## 2024-01-29 RX ADMIN — OXYBUTYNIN CHLORIDE 5 MG: 5 TABLET ORAL at 02:01

## 2024-01-29 RX ADMIN — INSULIN ASPART 6 UNITS: 100 INJECTION, SOLUTION INTRAVENOUS; SUBCUTANEOUS at 03:01

## 2024-01-29 RX ADMIN — OXYBUTYNIN CHLORIDE 5 MG: 5 TABLET ORAL at 09:01

## 2024-01-29 RX ADMIN — ERYTHROMYCIN ETHYLSUCCINATE 200 MG: 200 SUSPENSION ORAL at 04:01

## 2024-01-29 RX ADMIN — PANTOPRAZOLE SODIUM 40 MG: 40 TABLET, DELAYED RELEASE ORAL at 03:01

## 2024-01-29 RX ADMIN — ALBUTEROL SULFATE 2.5 MG: 2.5 SOLUTION RESPIRATORY (INHALATION) at 07:01

## 2024-01-29 RX ADMIN — ALBUTEROL SULFATE 2.5 MG: 2.5 SOLUTION RESPIRATORY (INHALATION) at 02:01

## 2024-01-29 RX ADMIN — HEPARIN SODIUM 1000 UNITS: 1000 INJECTION, SOLUTION INTRAVENOUS; SUBCUTANEOUS at 11:01

## 2024-01-29 NOTE — CARE UPDATE
Care Update:     No acute events overnight. Patient in room 1051/1051 A. Blood glucose stable on current inpatient regimen.No hypoglycemia noted over the past 24-hours. Endocrine will continue to follow and manage insulin orders inpatient.  3 Days Post-Op    Steroid use- None.   Renal function-   Lab Results   Component Value Date    CREATININE 4.6 (H) 01/29/2024        Diet clear liquid Low Potassium     POCT Glucose   Date Value Ref Range Status   01/29/2024 109 70 - 110 mg/dL Final   01/28/2024 95 70 - 110 mg/dL Final   01/28/2024 81 70 - 110 mg/dL Final   01/28/2024 139 (H) 70 - 110 mg/dL Final   01/28/2024 94 70 - 110 mg/dL Final   01/27/2024 160 (H) 70 - 110 mg/dL Final   01/27/2024 136 (H) 70 - 110 mg/dL Final   01/27/2024 92 70 - 110 mg/dL Final   01/27/2024 119 (H) 70 - 110 mg/dL Final   01/26/2024 139 (H) 70 - 110 mg/dL Final   01/26/2024 86 70 - 110 mg/dL Final     Lab Results   Component Value Date    HGBA1C 4.9 11/15/2023         Home Medications:   LDC SSI   Blood sugar 150 to 200, + 1 unit  Blood sugar 201 to 250, + 2 units  Blood sugar 251 to 300, + 3 units  Blood sugar 301 to 350, + 4 units   Blood sugar greater than 350, + 5 units    Endocrine  Type 2 diabetes mellitus with diabetic polyneuropathy, without long-term current use of insulin  BG goal: 140-180     - Levemir 6 units daily    - Novolog 2 units with meals.   - Novolog Mod dose correction with ISF 25 starting at 150 prn   - POCT Glucose before meals and at bedtime   - Hypoglycemia protocol in place      ** Please notify Endocrine for any change and/or advance in diet**  ** Please call Endocrine for any BG related issues **     Discharge Planning:   TBD. Please notify endocrinology prior to discharge.    Pranav Perry DNP, FNP-C  Department of Endocrinology  Inpatient Glycemic Management

## 2024-01-29 NOTE — SUBJECTIVE & OBJECTIVE
Interval History: Patient seen and examined this morning in Hd UNIT. Complained of heavy feelings in stomach.  Medications:  Continuous Infusions:  Scheduled Meds:   sodium chloride 0.9%   Intravenous Once    acetylcysteine 100 mg/ml (10%)  4 mL Nebulization TID WAKE    cefpodoxime  200 mg Oral QHS    droNABinol  5 mg Oral BID    epoetin noble (PROCRIT) injection  4,000 Units Intravenous Every Mon, Wed, Fri    erythromycin ethylsuccinate  200 mg Oral QID (WM & HS)    fluconazole  200 mg Oral QHS    heparin (porcine)  5,000 Units Subcutaneous Q8H    insulin aspart U-100  2 Units Subcutaneous TIDWM    insulin detemir U-100  6 Units Subcutaneous Daily    LIDOcaine  1 patch Transdermal Q24H    metoprolol tartrate  12.5 mg Oral BID    metroNIDAZOLE  500 mg Oral Q12H    midodrine  10 mg Oral BID WM    oxybutynin  5 mg Oral TID    pantoprazole  40 mg Oral BID AC    polyethylene glycol  17 g Oral Daily    senna-docusate 8.6-50 mg  1 tablet Oral Daily    sodium chloride 0.9%  10 mL Intravenous Q6H    venlafaxine  37.5 mg Oral Daily     PRN Meds:sodium chloride 0.9%, acetaminophen, albuterol sulfate, bisacodyL, dextrose 10%, dextrose 10%, glucagon (human recombinant), glucose, glucose, heparin (porcine), hydrALAZINE, insulin aspart U-100, midodrine, ondansetron, sodium chloride 0.9%, Flushing PICC/Midline Protocol **AND** sodium chloride 0.9% **AND** sodium chloride 0.9%     Review of patient's allergies indicates:   Allergen Reactions    Crestor [rosuvastatin] Swelling    Gluten protein      Objective:     Vital Signs (Most Recent):  Temp: 97.7 °F (36.5 °C) (01/29/24 0753)  Pulse: 66 (01/29/24 0930)  Resp: 18 (01/29/24 0753)  BP: 138/67 (01/29/24 0930)  SpO2: 98 % (01/29/24 0753) Vital Signs (24h Range):  Temp:  [97.4 °F (36.3 °C)-98.7 °F (37.1 °C)] 97.7 °F (36.5 °C)  Pulse:  [61-91] 66  Resp:  [16-28] 18  SpO2:  [94 %-98 %] 98 %  BP: (118-142)/(58-68) 138/67     Weight: 67.6 kg (149 lb)  Body mass index is 24.79  kg/m².    Intake/Output - Last 3 Shifts         01/27 0700  01/28 0659 01/28 0700  01/29 0659 01/29 0700 01/30 0659    P.O.  300     I.V. (mL/kg)       IV Piggyback       Total Intake(mL/kg)  300 (4.4)     Other       Stool       Chest Tube       Total Output       Net  +300            Urine Occurrence  1 x     Stool Occurrence 0 x 2 x              Physical Exam  Vitals and nursing note reviewed.   Constitutional:       Appearance: Normal appearance.   HENT:      Head: Normocephalic and atraumatic.      Nose: Nose normal.   Cardiovascular:      Rate and Rhythm: Normal rate and regular rhythm.      Pulses: Normal pulses.   Pulmonary:      Effort: Pulmonary effort is normal. No respiratory distress.      Comments: Right posterior chest incision c/d/I  Right chest permacath    Abdominal:      General: Abdomen is flat. There is no distension.      Palpations: Abdomen is soft.      Tenderness: There is no abdominal tenderness.      Comments: Soft, nontender abdomen   Musculoskeletal:      Right lower leg: Edema present.      Left lower leg: Edema present.   Skin:     General: Skin is warm and dry.      Coloration: Skin is pale.      Comments: Groin soft, dressings c/d/i   Neurological:      General: No focal deficit present.      Mental Status: She is alert.          Significant Labs:  I have reviewed all pertinent lab results within the past 24 hours.  CBC:   Recent Labs   Lab 01/29/24  0426   WBC 8.44   RBC 3.11*   HGB 9.2*   HCT 29.2*   *   MCV 94   MCH 29.6   MCHC 31.5*       CMP:   Recent Labs   Lab 01/29/24  0426   GLU 88   CALCIUM 8.5*   ALBUMIN 2.1*   PROT 5.0*   *   K 4.5   CO2 20*      BUN 52*   CREATININE 4.6*   ALKPHOS 98   ALT 9*   AST 14   BILITOT 0.6         Significant Diagnostics:  I have reviewed all pertinent imaging results/findings within the past 24 hours.

## 2024-01-29 NOTE — ASSESSMENT & PLAN NOTE
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 Stepped up to the ICU 1/5 for afib RVR which was refractory to medical management and c/b development of tachy-eulogio syndrome now s/p micra placement 1/16 for tachy-eulogio syndrome in the setting of RVR. Did have brief SBO type picture 1/7 which resolved with conservative management.     - s/p R thoracotomy with R lower lobectomy for carcinoid w/ MLND; IPV injury requiring repair. CT removed post operatively.  - Refractory afib RVR requiring ICU step up 1/05, s/p chemical cardioversion initially with recurrence c/b developing tachy-eulogio syndrome    - cards and EP following, appreciate assistance            - AC indicated 2/2 intermittent atrial fib episodes and chadsvasc score of 2     - s/p TVP placement and now micra implantation 1/16    - Heparin gtt resumed 1/22; will need to transition to eliquis likely following CT removal; held 1/23 2/2 GI bleed   - s/p R chest tube placement with IR 1/17, L chest tube placement 1/18; L CT removed 1/25; Last R CT out 1/28  - Pulmonary toilet: IS, Acapella, Mucomyst daily; CPT ordered  - Infectious work up 2/2 leukocytosis. She remains afebrile. Effusions and compressive atelectasis also likely contributing to leukocytosis.Trended up to 30k 1/16, now downtrending. Zosyn 1/11-1/15; Meropenam 1/16 - 1/20; Plan for Cefpodoxime and metronidazole to start 1/21 per ID. 4 week abx plan for empiric treatment of presumed parapneumonic effusion - end date 2/15/2024.    - ID following, appreciate assistance. Reconsult following thora results.   - Bcx 1/11: Staph, probable contaminant  - Bcx1/12: No growth  - Rapid blood PCR: staph epi  - Sputum Cx 1/11: klebsiella & e. Coli  - R pleural fluid Cx 1/17: No growth (fungal pending still, neg KOH)  - SBO type  episode 1/07 requiring NGT decompression, which resolved with conservative management   - GI bleed 1/23/2024 - heparin held, 2uPRBC, 1uFFP, heparin reversed. GI consulted                       - EGD 1/24 - could not visualize stomach much 2/2 food residue; EGD aborted                       - 2u PRBC overnight 1/25 for Hgb 6.3, slow downtrend. Repeat EGD 1/26 demonstrates non-bleeding ulcers    - Continue protonix. GI signed off. Repeat scope in 8 weeks. F/u H pylori   - clear liquid diet, novasource renal BID; marinol for appetite   - Bowel regimen, PPI  - Nephrology following, appreciate assistance. CRRT 1/17 overnight. HD trial 1/19 passed but requiring midodrine, 1.5 L LR, metoprolol for RVR. HD 1/26           - LUE AVF created 10.2023, US 1/22/2023 with good flow metrics, okay to use per vascular   - Oxybutinin for bladder spasms   - MM pain control as needed  - Continue PT/OT, mobilization, likely post hospital placement; engaged social work

## 2024-01-29 NOTE — PROGRESS NOTES
[facility] - [facility]  General Surgery  Progress Note    Subjective:     History of Present Illness:  No notes on file    Post-Op Info:  Procedure(s) (LRB):  EGD (ESOPHAGOGASTRODUODENOSCOPY) (N/A)   3 Days Post-Op     Interval History:   -Reports one dark stool overnight. Hgb stable this morning  -Some nausea and poor appetite  -2L NC    Medications:  Continuous Infusions:  Scheduled Meds:   sodium chloride 0.9%   Intravenous Once    acetylcysteine 100 mg/ml (10%)  4 mL Nebulization TID WAKE    cefpodoxime  200 mg Oral QHS    droNABinol  5 mg Oral BID    epoetin noble (PROCRIT) injection  4,000 Units Intravenous Every Mon, Wed, Fri    erythromycin ethylsuccinate  200 mg Oral QID (WM & HS)    fluconazole  200 mg Oral QHS    insulin aspart U-100  2 Units Subcutaneous TIDWM    insulin detemir U-100  6 Units Subcutaneous Daily    LIDOcaine  1 patch Transdermal Q24H    metoprolol tartrate  12.5 mg Oral BID    metroNIDAZOLE  500 mg Oral Q12H    midodrine  10 mg Oral BID WM    oxybutynin  5 mg Oral TID    pantoprazole  40 mg Oral BID AC    polyethylene glycol  17 g Oral Daily    senna-docusate 8.6-50 mg  1 tablet Oral Daily    sodium chloride 0.9%  10 mL Intravenous Q6H    venlafaxine  37.5 mg Oral Daily     PRN Meds:sodium chloride 0.9%, acetaminophen, albuterol sulfate, bisacodyL, dextrose 10%, dextrose 10%, glucagon (human recombinant), glucose, glucose, heparin (porcine), hydrALAZINE, insulin aspart U-100, midodrine, ondansetron, sodium chloride 0.9%, Flushing PICC/Midline Protocol **AND** sodium chloride 0.9% **AND** sodium chloride 0.9%     Review of patient's allergies indicates:   Allergen Reactions    Crestor [rosuvastatin] Swelling    Gluten protein      Objective:     Vital Signs (Most Recent):  Temp: 97.7 °F (36.5 °C) (01/29/24 0753)  Pulse: 71 (01/29/24 0753)  Resp: 18 (01/29/24 0753)  BP: 122/61 (01/29/24 0753)  SpO2: 98 % (01/29/24 0753) Vital Signs (24h Range):  Temp:  [97.4 °F (36.3 °C)-98.7 °F (37.1 °C)] 97.7  °F (36.5 °C)  Pulse:  [61-91] 71  Resp:  [16-28] 18  SpO2:  [94 %-98 %] 98 %  BP: (118-124)/(58-64) 122/61     Weight: 67.6 kg (149 lb)  Body mass index is 24.79 kg/m².    Intake/Output - Last 3 Shifts         01/27 0700  01/28 0659 01/28 0700 01/29 0659 01/29 0700 01/30 0659    P.O.  300     I.V. (mL/kg)       IV Piggyback       Total Intake(mL/kg)  300 (4.4)     Other       Stool       Chest Tube       Total Output       Net  +300            Urine Occurrence  1 x     Stool Occurrence 0 x 2 x              Physical Exam  Vitals and nursing note reviewed.   Constitutional:       Appearance: Normal appearance.   HENT:      Head: Normocephalic and atraumatic.      Nose: Nose normal.   Cardiovascular:      Rate and Rhythm: Normal rate and regular rhythm.      Pulses: Normal pulses.   Pulmonary:      Effort: Pulmonary effort is normal. No respiratory distress.      Comments: Right posterior chest incision c/d/I  Right chest permacath    Abdominal:      General: Abdomen is flat. There is no distension.      Palpations: Abdomen is soft.      Tenderness: There is no abdominal tenderness.      Comments: Soft, nontender abdomen   Musculoskeletal:      Right lower leg: Edema present.      Left lower leg: Edema present.   Skin:     General: Skin is warm and dry.      Coloration: Skin is pale.      Comments: Groin soft, dressings c/d/i   Neurological:      General: No focal deficit present.      Mental Status: She is alert.          Significant Labs:  I have reviewed all pertinent lab results within the past 24 hours.  CBC:   Recent Labs   Lab 01/29/24  0426   WBC 8.44   RBC 3.11*   HGB 9.2*   HCT 29.2*   *   MCV 94   MCH 29.6   MCHC 31.5*     CMP:   Recent Labs   Lab 01/29/24  0426   GLU 88   CALCIUM 8.5*   ALBUMIN 2.1*   PROT 5.0*   *   K 4.5   CO2 20*      BUN 52*   CREATININE 4.6*   ALKPHOS 98   ALT 9*   AST 14   BILITOT 0.6       Significant Diagnostics:  I have reviewed all pertinent imaging  results/findings within the past 24 hours.  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 Stepped up to the ICU 1/5 for afib RVR which was refractory to medical management and c/b development of tachy-eulogio syndrome now s/p micra placement 1/16 for tachy-eulogio syndrome in the setting of RVR. Did have brief SBO type picture 1/7 which resolved with conservative management.     - s/p R thoracotomy with R lower lobectomy for carcinoid w/ MLND; IPV injury requiring repair. CT removed post operatively.  - Refractory afib RVR requiring ICU step up 1/05, s/p chemical cardioversion initially with recurrence c/b developing tachy-eulogio syndrome    - cards and EP following, appreciate assistance            - AC indicated 2/2 intermittent atrial fib episodes and chadsvasc score of 2     - s/p TVP placement and now micra implantation 1/16    - Heparin gtt resumed 1/22; will need to transition to eliquis likely following CT removal; held 1/23 2/2 GI bleed   - s/p R chest tube placement with IR 1/17, L chest tube placement 1/18; L CT removed 1/25; Last R CT out 1/28  - Pulmonary toilet: IS, Acapella, Mucomyst daily; CPT ordered  - Infectious work up 2/2 leukocytosis. She remains afebrile. Effusions and compressive atelectasis also likely contributing to leukocytosis.Trended up to 30k 1/16, now downtrending. Zosyn 1/11-1/15; Meropenam 1/16 - 1/20; Plan for Cefpodoxime and metronidazole to start 1/21 per ID. 4 week abx plan for empiric treatment of presumed parapneumonic effusion - end date 2/15/2024.    - ID following, appreciate assistance. Reconsult following thora results.   - Bcx 1/11: Staph, probable contaminant  - Bcx1/12: No growth  - Rapid blood PCR: staph epi  - Sputum Cx  1/11: klebsiella & e. Coli  - R pleural fluid Cx 1/17: No growth (fungal pending still, neg KOH)  - SBO type episode 1/07 requiring NGT decompression, which resolved with conservative management   - GI bleed 1/23/2024 - heparin held, 2uPRBC, 1uFFP, heparin reversed. GI consulted                       - EGD 1/24 - could not visualize stomach much 2/2 food residue; EGD aborted                       - 2u PRBC overnight 1/25 for Hgb 6.3, slow downtrend. Repeat EGD 1/26 demonstrates non-bleeding ulcers    - Continue protonix. GI signed off. Repeat scope in 8 weeks. F/u H pylori   - clear liquid diet, novasource renal BID; marinol for appetite   - Bowel regimen, PPI  - Nephrology following, appreciate assistance. CRRT 1/17 overnight. HD trial 1/19 passed but requiring midodrine, 1.5 L LR, metoprolol for RVR. HD 1/26           - LUE AVF created 10.2023, US 1/22/2023 with good flow metrics, okay to use per vascular   - Oxybutinin for bladder spasms   - MM pain control as needed  - Continue PT/OT, mobilization, likely post hospital placement; engaged social work        Mike Lam MD  General Surgery  [facility]

## 2024-01-29 NOTE — PROGRESS NOTES
Patient arrived in a stretcher to dialysis unit. Pt with generalized weakness   Report received from primary nurse  VS's per dialysis Flowsheet.     Hemodialysis initiated using the following:     Dialysis Access: RIJ     Will Maintain telemetry and blood pressure monitoring throughout treatment.  Refer to dialysis flowsheet and MAR for details.

## 2024-01-29 NOTE — PT/OT/SLP PROGRESS
Physical Therapy      Patient Name:  Lily Green   MRN:  [id number]    Patient not seen today secondary to Dialysis. Will follow-up next scheduled treatment per PT POC.

## 2024-01-29 NOTE — SUBJECTIVE & OBJECTIVE
Interval History:   -Reports one dark stool overnight. Hgb stable this morning  -Some nausea and poor appetite  -2L NC    Medications:  Continuous Infusions:  Scheduled Meds:   sodium chloride 0.9%   Intravenous Once    acetylcysteine 100 mg/ml (10%)  4 mL Nebulization TID WAKE    cefpodoxime  200 mg Oral QHS    droNABinol  5 mg Oral BID    epoetin noble (PROCRIT) injection  4,000 Units Intravenous Every Mon, Wed, Fri    erythromycin ethylsuccinate  200 mg Oral QID (WM & HS)    fluconazole  200 mg Oral QHS    insulin aspart U-100  2 Units Subcutaneous TIDWM    insulin detemir U-100  6 Units Subcutaneous Daily    LIDOcaine  1 patch Transdermal Q24H    metoprolol tartrate  12.5 mg Oral BID    metroNIDAZOLE  500 mg Oral Q12H    midodrine  10 mg Oral BID WM    oxybutynin  5 mg Oral TID    pantoprazole  40 mg Oral BID AC    polyethylene glycol  17 g Oral Daily    senna-docusate 8.6-50 mg  1 tablet Oral Daily    sodium chloride 0.9%  10 mL Intravenous Q6H    venlafaxine  37.5 mg Oral Daily     PRN Meds:sodium chloride 0.9%, acetaminophen, albuterol sulfate, bisacodyL, dextrose 10%, dextrose 10%, glucagon (human recombinant), glucose, glucose, heparin (porcine), hydrALAZINE, insulin aspart U-100, midodrine, ondansetron, sodium chloride 0.9%, Flushing PICC/Midline Protocol **AND** sodium chloride 0.9% **AND** sodium chloride 0.9%     Review of patient's allergies indicates:   Allergen Reactions    Crestor [rosuvastatin] Swelling    Gluten protein      Objective:     Vital Signs (Most Recent):  Temp: 97.7 °F (36.5 °C) (01/29/24 0753)  Pulse: 71 (01/29/24 0753)  Resp: 18 (01/29/24 0753)  BP: 122/61 (01/29/24 0753)  SpO2: 98 % (01/29/24 0753) Vital Signs (24h Range):  Temp:  [97.4 °F (36.3 °C)-98.7 °F (37.1 °C)] 97.7 °F (36.5 °C)  Pulse:  [61-91] 71  Resp:  [16-28] 18  SpO2:  [94 %-98 %] 98 %  BP: (118-124)/(58-64) 122/61     Weight: 67.6 kg (149 lb)  Body mass index is 24.79 kg/m².    Intake/Output - Last 3 Shifts          01/27 0700  01/28 0659 01/28 0700  01/29 0659 01/29 0700  01/30 0659    P.O.  300     I.V. (mL/kg)       IV Piggyback       Total Intake(mL/kg)  300 (4.4)     Other       Stool       Chest Tube       Total Output       Net  +300            Urine Occurrence  1 x     Stool Occurrence 0 x 2 x              Physical Exam  Vitals and nursing note reviewed.   Constitutional:       Appearance: Normal appearance.   HENT:      Head: Normocephalic and atraumatic.      Nose: Nose normal.   Cardiovascular:      Rate and Rhythm: Normal rate and regular rhythm.      Pulses: Normal pulses.   Pulmonary:      Effort: Pulmonary effort is normal. No respiratory distress.      Comments: Right posterior chest incision c/d/I  Right chest permacath    Abdominal:      General: Abdomen is flat. There is no distension.      Palpations: Abdomen is soft.      Tenderness: There is no abdominal tenderness.      Comments: Soft, nontender abdomen   Musculoskeletal:      Right lower leg: Edema present.      Left lower leg: Edema present.   Skin:     General: Skin is warm and dry.      Coloration: Skin is pale.      Comments: Groin soft, dressings c/d/i   Neurological:      General: No focal deficit present.      Mental Status: She is alert.          Significant Labs:  I have reviewed all pertinent lab results within the past 24 hours.  CBC:   Recent Labs   Lab 01/29/24  0426   WBC 8.44   RBC 3.11*   HGB 9.2*   HCT 29.2*   *   MCV 94   MCH 29.6   MCHC 31.5*     CMP:   Recent Labs   Lab 01/29/24  0426   GLU 88   CALCIUM 8.5*   ALBUMIN 2.1*   PROT 5.0*   *   K 4.5   CO2 20*      BUN 52*   CREATININE 4.6*   ALKPHOS 98   ALT 9*   AST 14   BILITOT 0.6       Significant Diagnostics:  I have reviewed all pertinent imaging results/findings within the past 24 hours.

## 2024-01-29 NOTE — ASSESSMENT & PLAN NOTE
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 On HD ~ 1 year. Last HD prior to presentation 1/1/24. Pt has NELY AVF placed on 10/23 that has not been cleared by vascular. Currently using RIJ TDC.     During her admission: stepped up to ICU for Afib with RVR. Was started on BB, amio and digoxin. However 1/12/24, had multiple episodes of eulogio with few asystole requiring chest compressions. Digoxin level elevated and dig-meaghan was given with resulting decrease in levels.     Nephrology History  iHD Schedule: TTS   Unit/MD: [facility]/ Dr. Vyas  Duration: 3.5 hours   EDW: 58 kg   Access: RIJ TDC  Residual Renal Function: minimal     Assessment:     -plan for HD today.  -Strict ins and outs  -Avoid nephrotoxic agents if possible and renally dose medications  -Avoid drastic hemodynamic changes if possible

## 2024-01-29 NOTE — TELEPHONE ENCOUNTER
"----- Message from Ena Garg sent at 2024  8:25 AM CST -----    ----- Message -----  From: Nadia Herring MD  Sent: 2024  10:23 AM CST  To: [facility]    Procedure: EGD    Diagnosis: Peptic ulcer disease    Procedure Timin weeks    #If within 4 weeks selected, please nevin as high priority#    #If greater than 12 weeks, please select "5-12 weeks" and delay sending until 2 months prior to requested date#     Provider: Any endoscopist    Location: No Preference    Additional Scheduling Information: Blood thinners    Prep Specifications:N/A    Have you attached a patient to this message: yes        "

## 2024-01-29 NOTE — CONSULTS
[facility]  Wound Care    Patient Name:  Lily Grene   MRN:  [id number]  Date: 2024  Diagnosis: Malignant carcinoid tumor of bronchus    History:     Past Medical History:   Diagnosis Date    Anxiety     Celiac disease 2018    Celiac disease     Depression     Diabetes mellitus     Family history of breast cancer in mother      at age 68    Hyperlipidemia     Hypertension     Meniere disease     Meniere's disease, unspecified ear     Menopause     Peptic ulcer     Reflux esophagitis     Urinary tract infection     Vaginal infection     Vaginal Pap smear 2014    Pap/Hpv Negative        Social History     Socioeconomic History    Marital status:    Tobacco Use    Smoking status: Never    Smokeless tobacco: Never   Substance and Sexual Activity    Alcohol use: No    Drug use: Never    Sexual activity: Not Currently     Partners: Male     Birth control/protection: See Surgical Hx     Comment: :      Social Determinants of Health     Financial Resource Strain: Low Risk  (2023)    Overall Financial Resource Strain (CARDIA)     Difficulty of Paying Living Expenses: Not hard at all   Food Insecurity: No Food Insecurity (2023)    Hunger Vital Sign     Worried About Running Out of Food in the Last Year: Never true     Ran Out of Food in the Last Year: Never true   Transportation Needs: No Transportation Needs (2023)    PRAPARE - Transportation     Lack of Transportation (Medical): No     Lack of Transportation (Non-Medical): No   Physical Activity: Insufficiently Active (2023)    Exercise Vital Sign     Days of Exercise per Week: 4 days     Minutes of Exercise per Session: 10 min   Stress: Stress Concern Present (2023)    [employer] [place] [employer] - Occupational Stress Questionnaire     Feeling of Stress : To some extent   Social Connections: Unknown (2023)    Social Connection and Isolation Panel [NHANES]     Frequency of  "Communication with Friends and Family: More than three times a week     Frequency of Social Gatherings with Friends and Family: More than three times a week     Active Member of Clubs or Organizations: Yes     Attends Club or Organization Meetings: More than 4 times per year     Marital Status:    Housing Stability: Low Risk  (11/22/2023)    Housing Stability Vital Sign     Unable to Pay for Housing in the Last Year: No     Number of Places Lived in the Last Year: 1     Unstable Housing in the Last Year: No       Precautions:     Allergies as of 11/29/2023 - Reviewed 11/27/2023   Allergen Reaction Noted    Crestor [rosuvastatin] Swelling 11/01/2017    Gluten protein  11/27/2020       WO Assessment Details/Treatment   Patient seen for wound care consultation.   Reviewed chart for this encounter.   See Flow Sheet for findings.      RECOMMENDATIONS:Patient in chair, Aox4, and agreeable to inpatient wound care. RN consult for right arm. After assessment, right arm clean, dry, intact with no wounds or skin abnormalities. Patient's spouse at bedise and states "I don't remember anything there". No other needs or concerns at this time. Wound care sign off.     Discussed POC with patient and primary nurse.   See EMR for orders & patient education.    Discussed nutrition and the role of protein in wound healing with the patient. Instructed patient to optimize protein for wound healing.    Bedside nursing to continue care & monitoring.  Bedside nursing to maintain pressure injury prevention interventions.            01/29/24 1320   WOCN Assessment   WOCN Total Time (mins) 15   Visit Date 01/29/24   Visit Time 1320   Consult Type New   WOCN Speciality Wound   Intervention assessed;chart review   Teaching on-going         Silver Crane RN  01/29/2024    "

## 2024-01-29 NOTE — PT/OT/SLP PROGRESS
Occupational Therapy      Patient Name:  Lily Green   MRN:  [id number]    Patient not seen today secondary to Dialysis. Will follow-up .    1/29/2024

## 2024-01-29 NOTE — PLAN OF CARE
01/29/24 1449   Post-Acute Status   Post-Acute Authorization Placement   Post-Acute Placement Status Pending payor review/awaiting authorization (if required)   Hospital Resources/Appts/Education Provided Provided patient/caregiver with written discharge plan information   Patient choice form signed by patient/caregiver List with quality metrics by geographic area provided   Discharge Delays None known at this time   Discharge Plan   Discharge Plan A Long-term acute care facility (LTAC)       Pending auth w/ [employer] at [facility]-LTAC.     Yesenia Burciaga LCSW  Case Management/[facility]  [phone]

## 2024-01-29 NOTE — PROGRESS NOTES
[facility]  Nephrology  Progress Note    Patient Name: Lily Green  MRN: [id number]  Admission Date: 1/2/2024  Hospital Length of Stay: 27 days  Attending Provider: Tan Thomson MD   Primary Care Physician: Pavel Calixto MD  Principal Problem:Malignant carcinoid tumor of bronchus    Subjective:     HPI: Patient is a 73 y.o. 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 S/p thoracotomy. Last HD prior to presentation 1/1/24. Nephrology consulted for ESRD on HD.        Interval History: Patient seen and examined this morning in Hd UNIT. Complained of heavy feelings in stomach.  Medications:  Continuous Infusions:  Scheduled Meds:   sodium chloride 0.9%   Intravenous Once    acetylcysteine 100 mg/ml (10%)  4 mL Nebulization TID WAKE    cefpodoxime  200 mg Oral QHS    droNABinol  5 mg Oral BID    epoetin noble (PROCRIT) injection  4,000 Units Intravenous Every Mon, Wed, Fri    erythromycin ethylsuccinate  200 mg Oral QID (WM & HS)    fluconazole  200 mg Oral QHS    heparin (porcine)  5,000 Units Subcutaneous Q8H    insulin aspart U-100  2 Units Subcutaneous TIDWM    insulin detemir U-100  6 Units Subcutaneous Daily    LIDOcaine  1 patch Transdermal Q24H    metoprolol tartrate  12.5 mg Oral BID    metroNIDAZOLE  500 mg Oral Q12H    midodrine  10 mg Oral BID WM    oxybutynin  5 mg Oral TID    pantoprazole  40 mg Oral BID AC    polyethylene glycol  17 g Oral Daily    senna-docusate 8.6-50 mg  1 tablet Oral Daily    sodium chloride 0.9%  10 mL Intravenous Q6H    venlafaxine  37.5 mg Oral Daily     PRN Meds:sodium chloride 0.9%, acetaminophen, albuterol sulfate, bisacodyL, dextrose 10%, dextrose 10%, glucagon (human recombinant), glucose, glucose, heparin (porcine), hydrALAZINE, insulin aspart U-100, midodrine, ondansetron, sodium chloride 0.9%, Flushing PICC/Midline Protocol **AND**  sodium chloride 0.9% **AND** sodium chloride 0.9%     Review of patient's allergies indicates:   Allergen Reactions    Crestor [rosuvastatin] Swelling    Gluten protein      Objective:     Vital Signs (Most Recent):  Temp: 97.7 °F (36.5 °C) (01/29/24 0753)  Pulse: 66 (01/29/24 0930)  Resp: 18 (01/29/24 0753)  BP: 138/67 (01/29/24 0930)  SpO2: 98 % (01/29/24 0753) Vital Signs (24h Range):  Temp:  [97.4 °F (36.3 °C)-98.7 °F (37.1 °C)] 97.7 °F (36.5 °C)  Pulse:  [61-91] 66  Resp:  [16-28] 18  SpO2:  [94 %-98 %] 98 %  BP: (118-142)/(58-68) 138/67     Weight: 67.6 kg (149 lb)  Body mass index is 24.79 kg/m².    Intake/Output - Last 3 Shifts         01/27 0700  01/28 0659 01/28 0700  01/29 0659 01/29 0700  01/30 0659    P.O.  300     I.V. (mL/kg)       IV Piggyback       Total Intake(mL/kg)  300 (4.4)     Other       Stool       Chest Tube       Total Output       Net  +300            Urine Occurrence  1 x     Stool Occurrence 0 x 2 x             Physical Exam  Vitals and nursing note reviewed.   Constitutional:       Appearance: Normal appearance.   HENT:      Head: Normocephalic and atraumatic.      Nose: Nose normal.   Cardiovascular:      Rate and Rhythm: Normal rate and regular rhythm.      Pulses: Normal pulses.   Pulmonary:      Effort: Pulmonary effort is normal. No respiratory distress.      Comments: Right posterior chest incision c/d/I  Right chest permacath    Abdominal:      General: Abdomen is flat. There is no distension.      Palpations: Abdomen is soft.      Tenderness: There is no abdominal tenderness.      Comments: Soft, nontender abdomen   Musculoskeletal:      Right lower leg: Edema present.      Left lower leg: Edema present.   Skin:     General: Skin is warm and dry.      Coloration: Skin is pale.      Comments: Groin soft, dressings c/d/i   Neurological:      General: No focal deficit present.      Mental Status: She is alert.          Significant Labs:  I have reviewed all pertinent lab results  within the past 24 hours.  CBC:   Recent Labs   Lab 01/29/24  0426   WBC 8.44   RBC 3.11*   HGB 9.2*   HCT 29.2*   *   MCV 94   MCH 29.6   MCHC 31.5*       CMP:   Recent Labs   Lab 01/29/24  0426   GLU 88   CALCIUM 8.5*   ALBUMIN 2.1*   PROT 5.0*   *   K 4.5   CO2 20*      BUN 52*   CREATININE 4.6*   ALKPHOS 98   ALT 9*   AST 14   BILITOT 0.6         Significant Diagnostics:  I have reviewed all pertinent imaging results/findings within the past 24 hours.  Assessment/Plan:     Cardiac/Vascular  Atrial fibrillation  -management per primary     Renal/  ESRD (end stage renal disease)  S/p R thoracotomy due to RML carcinoid tumor. On HD ~ 1 year. Last HD prior to presentation 1/1/24. Pt has NELY AVF placed on 10/23 that has not been cleared by vascular. Currently using RIJ TDC.     During her admission: stepped up to ICU for Afib with RVR. Was started on BB, amio and digoxin. However 1/12/24, had multiple episodes of eulogio with few asystole requiring chest compressions. Digoxin level elevated and dig-meaghan was given with resulting decrease in levels.     Nephrology History  iHD Schedule: TTS   Unit/MD: Timbo Hinds/ Dr. Vyas  Duration: 3.5 hours   EDW: 58 kg   Access: RIJ TDC  Residual Renal Function: minimal     Assessment:     -plan for HD today.  -Strict ins and outs  -Avoid nephrotoxic agents if possible and renally dose medications  -Avoid drastic hemodynamic changes if possible          Thank you for your consult. I will follow-up with patient. Please contact us if you have any additional questions.    Abdi Alexis MD  Nephrology  [facility]

## 2024-01-30 PROBLEM — N18.9 CHRONIC KIDNEY DISEASE-MINERAL AND BONE DISORDER: Status: ACTIVE | Noted: 2024-01-30

## 2024-01-30 PROBLEM — N18.6 ANEMIA IN ESRD (END-STAGE RENAL DISEASE): Status: ACTIVE | Noted: 2023-01-30

## 2024-01-30 PROBLEM — M89.9 CHRONIC KIDNEY DISEASE-MINERAL AND BONE DISORDER: Status: ACTIVE | Noted: 2024-01-30

## 2024-01-30 PROBLEM — E83.9 CHRONIC KIDNEY DISEASE-MINERAL AND BONE DISORDER: Status: ACTIVE | Noted: 2024-01-30

## 2024-01-30 PROBLEM — D63.1 ANEMIA IN ESRD (END-STAGE RENAL DISEASE): Status: ACTIVE | Noted: 2023-01-30

## 2024-01-30 LAB
ALBUMIN SERPL BCP-MCNC: 1.6 G/DL (ref 3.5–5.2)
ALP SERPL-CCNC: 75 U/L (ref 55–135)
ALT SERPL W/O P-5'-P-CCNC: 5 U/L (ref 10–44)
ANION GAP SERPL CALC-SCNC: 7 MMOL/L (ref 8–16)
AST SERPL-CCNC: 14 U/L (ref 10–40)
BASOPHILS # BLD AUTO: 0.01 K/UL (ref 0–0.2)
BASOPHILS # BLD AUTO: 0.02 K/UL (ref 0–0.2)
BASOPHILS NFR BLD: 0.2 % (ref 0–1.9)
BASOPHILS NFR BLD: 0.3 % (ref 0–1.9)
BILIRUB SERPL-MCNC: 0.4 MG/DL (ref 0.1–1)
BUN SERPL-MCNC: 28 MG/DL (ref 8–23)
CALCIUM SERPL-MCNC: 6.9 MG/DL (ref 8.7–10.5)
CHLORIDE SERPL-SCNC: 110 MMOL/L (ref 95–110)
CO2 SERPL-SCNC: 19 MMOL/L (ref 23–29)
CREAT SERPL-MCNC: 2.7 MG/DL (ref 0.5–1.4)
DIFFERENTIAL METHOD BLD: ABNORMAL
DIFFERENTIAL METHOD BLD: ABNORMAL
EOSINOPHIL # BLD AUTO: 0 K/UL (ref 0–0.5)
EOSINOPHIL # BLD AUTO: 0 K/UL (ref 0–0.5)
EOSINOPHIL NFR BLD: 0.4 % (ref 0–8)
EOSINOPHIL NFR BLD: 0.7 % (ref 0–8)
ERYTHROCYTE [DISTWIDTH] IN BLOOD BY AUTOMATED COUNT: 25.9 % (ref 11.5–14.5)
ERYTHROCYTE [DISTWIDTH] IN BLOOD BY AUTOMATED COUNT: 26 % (ref 11.5–14.5)
EST. GFR  (NO RACE VARIABLE): 18.1 ML/MIN/1.73 M^2
GLUCOSE SERPL-MCNC: 70 MG/DL (ref 70–110)
HCT VFR BLD AUTO: 25.6 % (ref 37–48.5)
HCT VFR BLD AUTO: 30.5 % (ref 37–48.5)
HGB BLD-MCNC: 8 G/DL (ref 12–16)
HGB BLD-MCNC: 9.6 G/DL (ref 12–16)
IMM GRANULOCYTES # BLD AUTO: 0.05 K/UL (ref 0–0.04)
IMM GRANULOCYTES # BLD AUTO: 0.05 K/UL (ref 0–0.04)
IMM GRANULOCYTES NFR BLD AUTO: 0.7 % (ref 0–0.5)
IMM GRANULOCYTES NFR BLD AUTO: 0.9 % (ref 0–0.5)
LYMPHOCYTES # BLD AUTO: 0.6 K/UL (ref 1–4.8)
LYMPHOCYTES # BLD AUTO: 0.6 K/UL (ref 1–4.8)
LYMPHOCYTES NFR BLD: 10.7 % (ref 18–48)
LYMPHOCYTES NFR BLD: 7.6 % (ref 18–48)
MAGNESIUM SERPL-MCNC: 1.5 MG/DL (ref 1.6–2.6)
MCH RBC QN AUTO: 29.3 PG (ref 27–31)
MCH RBC QN AUTO: 29.6 PG (ref 27–31)
MCHC RBC AUTO-ENTMCNC: 31.3 G/DL (ref 32–36)
MCHC RBC AUTO-ENTMCNC: 31.5 G/DL (ref 32–36)
MCV RBC AUTO: 93 FL (ref 82–98)
MCV RBC AUTO: 95 FL (ref 82–98)
MONOCYTES # BLD AUTO: 0.6 K/UL (ref 0.3–1)
MONOCYTES # BLD AUTO: 0.6 K/UL (ref 0.3–1)
MONOCYTES NFR BLD: 10.1 % (ref 4–15)
MONOCYTES NFR BLD: 8.2 % (ref 4–15)
NEUTROPHILS # BLD AUTO: 4.4 K/UL (ref 1.8–7.7)
NEUTROPHILS # BLD AUTO: 6.1 K/UL (ref 1.8–7.7)
NEUTROPHILS NFR BLD: 77.4 % (ref 38–73)
NEUTROPHILS NFR BLD: 82.8 % (ref 38–73)
NRBC BLD-RTO: 0 /100 WBC
NRBC BLD-RTO: 0 /100 WBC
PHOSPHATE SERPL-MCNC: 3 MG/DL (ref 2.7–4.5)
PLATELET # BLD AUTO: 121 K/UL (ref 150–450)
PLATELET # BLD AUTO: 85 K/UL (ref 150–450)
PMV BLD AUTO: 9.9 FL (ref 9.2–12.9)
PMV BLD AUTO: 9.9 FL (ref 9.2–12.9)
POCT GLUCOSE: 160 MG/DL (ref 70–110)
POCT GLUCOSE: 192 MG/DL (ref 70–110)
POCT GLUCOSE: 194 MG/DL (ref 70–110)
POCT GLUCOSE: 88 MG/DL (ref 70–110)
POTASSIUM SERPL-SCNC: 2.9 MMOL/L (ref 3.5–5.1)
PROT SERPL-MCNC: 3.9 G/DL (ref 6–8.4)
RBC # BLD AUTO: 2.7 M/UL (ref 4–5.4)
RBC # BLD AUTO: 3.28 M/UL (ref 4–5.4)
SODIUM SERPL-SCNC: 136 MMOL/L (ref 136–145)
WBC # BLD AUTO: 5.62 K/UL (ref 3.9–12.7)
WBC # BLD AUTO: 7.4 K/UL (ref 3.9–12.7)

## 2024-01-30 PROCEDURE — 99232 SBSQ HOSP IP/OBS MODERATE 35: CPT | Mod: ,,,

## 2024-01-30 PROCEDURE — 25000003 PHARM REV CODE 250: Performed by: STUDENT IN AN ORGANIZED HEALTH CARE EDUCATION/TRAINING PROGRAM

## 2024-01-30 PROCEDURE — 94761 N-INVAS EAR/PLS OXIMETRY MLT: CPT

## 2024-01-30 PROCEDURE — 84100 ASSAY OF PHOSPHORUS: CPT

## 2024-01-30 PROCEDURE — 99900035 HC TECH TIME PER 15 MIN (STAT)

## 2024-01-30 PROCEDURE — 99222 1ST HOSP IP/OBS MODERATE 55: CPT | Mod: ,,, | Performed by: NURSE PRACTITIONER

## 2024-01-30 PROCEDURE — 85025 COMPLETE CBC W/AUTO DIFF WBC: CPT | Performed by: STUDENT IN AN ORGANIZED HEALTH CARE EDUCATION/TRAINING PROGRAM

## 2024-01-30 PROCEDURE — 25000003 PHARM REV CODE 250

## 2024-01-30 PROCEDURE — 63600175 PHARM REV CODE 636 W HCPCS: Performed by: STUDENT IN AN ORGANIZED HEALTH CARE EDUCATION/TRAINING PROGRAM

## 2024-01-30 PROCEDURE — 27000646 HC AEROBIKA DEVICE

## 2024-01-30 PROCEDURE — 97530 THERAPEUTIC ACTIVITIES: CPT | Mod: CQ

## 2024-01-30 PROCEDURE — 83735 ASSAY OF MAGNESIUM: CPT

## 2024-01-30 PROCEDURE — 94668 MNPJ CHEST WALL SBSQ: CPT

## 2024-01-30 PROCEDURE — 97535 SELF CARE MNGMENT TRAINING: CPT

## 2024-01-30 PROCEDURE — 25000242 PHARM REV CODE 250 ALT 637 W/ HCPCS

## 2024-01-30 PROCEDURE — 94664 DEMO&/EVAL PT USE INHALER: CPT

## 2024-01-30 PROCEDURE — 99232 SBSQ HOSP IP/OBS MODERATE 35: CPT | Mod: ,,, | Performed by: NURSE PRACTITIONER

## 2024-01-30 PROCEDURE — 20600001 HC STEP DOWN PRIVATE ROOM

## 2024-01-30 PROCEDURE — 27000221 HC OXYGEN, UP TO 24 HOURS

## 2024-01-30 PROCEDURE — 36415 COLL VENOUS BLD VENIPUNCTURE: CPT | Performed by: STUDENT IN AN ORGANIZED HEALTH CARE EDUCATION/TRAINING PROGRAM

## 2024-01-30 PROCEDURE — 94640 AIRWAY INHALATION TREATMENT: CPT

## 2024-01-30 PROCEDURE — 80053 COMPREHEN METABOLIC PANEL: CPT | Performed by: STUDENT IN AN ORGANIZED HEALTH CARE EDUCATION/TRAINING PROGRAM

## 2024-01-30 PROCEDURE — A4216 STERILE WATER/SALINE, 10 ML: HCPCS | Performed by: STUDENT IN AN ORGANIZED HEALTH CARE EDUCATION/TRAINING PROGRAM

## 2024-01-30 PROCEDURE — 25000003 PHARM REV CODE 250: Performed by: SURGERY

## 2024-01-30 PROCEDURE — 63600175 PHARM REV CODE 636 W HCPCS

## 2024-01-30 PROCEDURE — 97116 GAIT TRAINING THERAPY: CPT | Mod: CQ

## 2024-01-30 PROCEDURE — 85025 COMPLETE CBC W/AUTO DIFF WBC: CPT | Mod: 91 | Performed by: STUDENT IN AN ORGANIZED HEALTH CARE EDUCATION/TRAINING PROGRAM

## 2024-01-30 PROCEDURE — 25000242 PHARM REV CODE 250 ALT 637 W/ HCPCS: Performed by: STUDENT IN AN ORGANIZED HEALTH CARE EDUCATION/TRAINING PROGRAM

## 2024-01-30 PROCEDURE — 63600175 PHARM REV CODE 636 W HCPCS: Mod: JZ,JG | Performed by: PHYSICIAN ASSISTANT

## 2024-01-30 RX ORDER — ONDANSETRON HYDROCHLORIDE 2 MG/ML
8 INJECTION, SOLUTION INTRAVENOUS EVERY 6 HOURS PRN
Status: DISCONTINUED | OUTPATIENT
Start: 2024-01-30 | End: 2024-02-06 | Stop reason: HOSPADM

## 2024-01-30 RX ORDER — SODIUM CHLORIDE 9 MG/ML
INJECTION, SOLUTION INTRAVENOUS ONCE
Status: COMPLETED | OUTPATIENT
Start: 2024-01-31 | End: 2024-01-31

## 2024-01-30 RX ORDER — LANOLIN ALCOHOL/MO/W.PET/CERES
400 CREAM (GRAM) TOPICAL ONCE
Status: COMPLETED | OUTPATIENT
Start: 2024-01-30 | End: 2024-01-30

## 2024-01-30 RX ORDER — POTASSIUM CHLORIDE 750 MG/1
30 CAPSULE, EXTENDED RELEASE ORAL ONCE
Status: COMPLETED | OUTPATIENT
Start: 2024-01-30 | End: 2024-01-30

## 2024-01-30 RX ADMIN — VENLAFAXINE 37.5 MG: 37.5 TABLET ORAL at 08:01

## 2024-01-30 RX ADMIN — LIDOCAINE 5% 1 PATCH: 700 PATCH TOPICAL at 08:01

## 2024-01-30 RX ADMIN — ERYTHROMYCIN ETHYLSUCCINATE 200 MG: 200 SUSPENSION ORAL at 04:01

## 2024-01-30 RX ADMIN — ALTEPLASE 2 MG: 2.2 INJECTION, POWDER, LYOPHILIZED, FOR SOLUTION INTRAVENOUS at 11:01

## 2024-01-30 RX ADMIN — MIDODRINE HYDROCHLORIDE 10 MG: 5 TABLET ORAL at 08:01

## 2024-01-30 RX ADMIN — HEPARIN SODIUM 5000 UNITS: 5000 INJECTION INTRAVENOUS; SUBCUTANEOUS at 06:01

## 2024-01-30 RX ADMIN — ACETYLCYSTEINE 4 ML: 100 INHALANT RESPIRATORY (INHALATION) at 01:01

## 2024-01-30 RX ADMIN — OXYBUTYNIN CHLORIDE 5 MG: 5 TABLET ORAL at 08:01

## 2024-01-30 RX ADMIN — PANTOPRAZOLE SODIUM 40 MG: 40 TABLET, DELAYED RELEASE ORAL at 04:01

## 2024-01-30 RX ADMIN — METRONIDAZOLE 500 MG: 500 TABLET ORAL at 10:01

## 2024-01-30 RX ADMIN — METOPROLOL TARTRATE 12.5 MG: 25 TABLET, FILM COATED ORAL at 08:01

## 2024-01-30 RX ADMIN — ERYTHROMYCIN ETHYLSUCCINATE 200 MG: 200 SUSPENSION ORAL at 08:01

## 2024-01-30 RX ADMIN — OXYBUTYNIN CHLORIDE 5 MG: 5 TABLET ORAL at 02:01

## 2024-01-30 RX ADMIN — DRONABINOL 5 MG: 2.5 CAPSULE ORAL at 10:01

## 2024-01-30 RX ADMIN — INSULIN ASPART 1 UNITS: 100 INJECTION, SOLUTION INTRAVENOUS; SUBCUTANEOUS at 10:01

## 2024-01-30 RX ADMIN — ERYTHROMYCIN ETHYLSUCCINATE 200 MG: 200 SUSPENSION ORAL at 11:01

## 2024-01-30 RX ADMIN — DRONABINOL 5 MG: 2.5 CAPSULE ORAL at 08:01

## 2024-01-30 RX ADMIN — Medication 10 ML: at 06:01

## 2024-01-30 RX ADMIN — METOPROLOL TARTRATE 12.5 MG: 25 TABLET, FILM COATED ORAL at 10:01

## 2024-01-30 RX ADMIN — CEFPODOXIME PROXETIL 200 MG: 200 TABLET, FILM COATED ORAL at 10:01

## 2024-01-30 RX ADMIN — Medication 10 ML: at 11:01

## 2024-01-30 RX ADMIN — HEPARIN SODIUM 5000 UNITS: 5000 INJECTION INTRAVENOUS; SUBCUTANEOUS at 10:01

## 2024-01-30 RX ADMIN — ALBUTEROL SULFATE 2.5 MG: 2.5 SOLUTION RESPIRATORY (INHALATION) at 07:01

## 2024-01-30 RX ADMIN — FLUCONAZOLE 200 MG: 200 TABLET ORAL at 10:01

## 2024-01-30 RX ADMIN — INSULIN ASPART 2 UNITS: 100 INJECTION, SOLUTION INTRAVENOUS; SUBCUTANEOUS at 05:01

## 2024-01-30 RX ADMIN — Medication 10 ML: at 05:01

## 2024-01-30 RX ADMIN — ERYTHROMYCIN ETHYLSUCCINATE 200 MG: 200 SUSPENSION ORAL at 10:01

## 2024-01-30 RX ADMIN — OXYBUTYNIN CHLORIDE 5 MG: 5 TABLET ORAL at 10:01

## 2024-01-30 RX ADMIN — Medication 400 MG: at 06:01

## 2024-01-30 RX ADMIN — MIDODRINE HYDROCHLORIDE 10 MG: 5 TABLET ORAL at 04:01

## 2024-01-30 RX ADMIN — POTASSIUM CHLORIDE 30 MEQ: 10 CAPSULE, COATED, EXTENDED RELEASE ORAL at 06:01

## 2024-01-30 RX ADMIN — ACETYLCYSTEINE 4 ML: 100 INHALANT RESPIRATORY (INHALATION) at 07:01

## 2024-01-30 RX ADMIN — METRONIDAZOLE 500 MG: 500 TABLET ORAL at 08:01

## 2024-01-30 RX ADMIN — HEPARIN SODIUM 5000 UNITS: 5000 INJECTION INTRAVENOUS; SUBCUTANEOUS at 02:01

## 2024-01-30 RX ADMIN — PANTOPRAZOLE SODIUM 40 MG: 40 TABLET, DELAYED RELEASE ORAL at 06:01

## 2024-01-30 RX ADMIN — Medication 10 ML: at 12:01

## 2024-01-30 RX ADMIN — ALBUTEROL SULFATE 2.5 MG: 2.5 SOLUTION RESPIRATORY (INHALATION) at 01:01

## 2024-01-30 RX ADMIN — ACETYLCYSTEINE 4 ML: 100 INHALANT RESPIRATORY (INHALATION) at 08:01

## 2024-01-30 RX ADMIN — ALBUTEROL SULFATE 2.5 MG: 2.5 SOLUTION RESPIRATORY (INHALATION) at 08:01

## 2024-01-30 NOTE — ASSESSMENT & PLAN NOTE
-S/P thoracotomy and resection with mediastinal lymph node dissection 1/2/2023.   -S/P Chest tube. Last one removed on 01/28.  -S/P PPM placement 01/16.  -Pt on 2 L O2 NC. Some SOB noted . Monitor.

## 2024-01-30 NOTE — PLAN OF CARE
Problem: Adult Inpatient Plan of Care  Goal: Plan of Care Review  Outcome: Ongoing, Progressing  Goal: Patient-Specific Goal (Individualized)  Outcome: Ongoing, Progressing  Goal: Absence of Hospital-Acquired Illness or Injury  Outcome: Ongoing, Progressing  Goal: Optimal Comfort and Wellbeing  Outcome: Ongoing, Progressing  Goal: Readiness for Transition of Care  Outcome: Ongoing, Progressing   [facility] Plan of Care Note  Dx malignant carcinoid tumor of bronchus    Shift Events had 3 diarrhea - PA Neto notified about the diarrhea; sat up on the chair for most of the shift- up to BSC x2    Goals of Care: VS and lab WDL, optimized nutrition; ambulating more often, weaned off oxygen    Neuro: AAOx4    Vital Signs: stable    Respiratory: on 2L via NC- having SOB with activities - resolved after resting    Diet: full liquid+ Novasource    Is patient tolerating current diet? Poor appetite- only drinking water and Novasource    GTTS: none    Urine Output/Bowel Movement: 3 BMs    Drains/Tubes/Tube Feeds (include total output/shift): none    Lines: midline IV, HD catheter, left arm AV fistula       Accuchecks:ac/hs    Skin: incision on right upper back CDI    Fall Risk Score: 17    Activity level? X2 person assist from bed to chair / BSC- cannot stand up for too long    Any scheduled procedures? none    Any safety concerns? Fall risk precaution    Other: IS and Acapella encouraged to use, can get to 750cc on IS; SCD on

## 2024-01-30 NOTE — ASSESSMENT & PLAN NOTE
BG goal: 140-180    - Levemir 4 units daily (20% reduction due to fasting blood glucose below goal.)   - Novolog Mod dose correction with ISF 25 starting at 150 prn   - POCT Glucose before meals and at bedtime   - Hypoglycemia protocol in place      ** Please notify Endocrine for any change and/or advance in diet**  ** Please call Endocrine for any BG related issues **     Discharge Planning:   TBD. Please notify endocrinology prior to discharge.

## 2024-01-30 NOTE — SUBJECTIVE & OBJECTIVE
Past Medical History:   Diagnosis Date    Anxiety     Celiac disease 2018    Celiac disease     Depression     Diabetes mellitus     Family history of breast cancer in mother      at age 68    Hyperlipidemia     Hypertension     Meniere disease     Meniere's disease, unspecified ear     Menopause     Peptic ulcer     Reflux esophagitis     Urinary tract infection     Vaginal infection     Vaginal Pap smear 2014    Pap/Hpv Negative      Past Surgical History:   Procedure Laterality Date    APPENDECTOMY      BREAST SURGERY      Tags    CARPAL TUNNEL RELEASE Bilateral 2017    ,     CLOSURE, COLOSTOMY Left 2023    Procedure: CLOSURE, COLOSTOMY;  Surgeon: Manuel Javier MD;  Location: [facility] OR;  Service: General;  Laterality: Left;    COLONOSCOPY  2018    Normal  ( Juan A)     COLOSTOMY Right 2023    Procedure: CREATION, COLOSTOMY;  Surgeon: Manuel Javier MD;  Location: [facility] OR;  Service: General;  Laterality: Right;    DILATION AND CURETTAGE OF UTERUS  1986    DUPUYTREN CONTRACTURE RELEASE Bilateral 2017    ESOPHAGOGASTRODUODENOSCOPY N/A 2024    Procedure: EGD (ESOPHAGOGASTRODUODENOSCOPY);  Surgeon: Yamilet Walters MD;  Location: [facility] ([address] [place] [address]);  Service: Gastroenterology;  Laterality: N/A;    ESOPHAGOGASTRODUODENOSCOPY N/A 2024    Procedure: EGD (ESOPHAGOGASTRODUODENOSCOPY);  Surgeon: Yamilet Walters MD;  Location: [address]);  Service: Gastroenterology;  Laterality: N/A;    HYSTERECTOMY      BEAU w/ appy, no BSO     IMPLANTATION OF LEADLESS PACEMAKER N/A 2024    Procedure: INSERTION, CARDIAC PACEMAKER, LEADLESS;  Surgeon: LENIN Frances MD;  Location: [facility] EP LAB;  Service: Cardiology;  Laterality: N/A;  SB, LEADLESS PPM (MICRA)SAM, ANES, EH, CVICU [id number]    INJECTION OF NEUROLYTIC AGENT AROUND LUMBAR SYMPATHETIC NERVE N/A 2022    Procedure: BLOCK, LUMBAR SYMPATHETIC;  Surgeon: Souleymane Rizo Jr., MD;  Location: [facility]  PAIN MGT;  Service: Pain Management;  Laterality: N/A;  no pacemaker   pt is diabetic     INJECTION OF NEUROLYTIC AGENT AROUND LUMBAR SYMPATHETIC NERVE N/A 8/25/2022    Procedure: BLOCK, LUMBAR SYMPATHETIC;  Surgeon: Souleymane Rizo Jr., MD;  Location: [facility] PAIN MGT;  Service: Pain Management;  Laterality: N/A;  diabetic     INSERTION OF TUNNELED CENTRAL VENOUS HEMODIALYSIS CATHETER Right 1/25/2023    Procedure: INSERTION, CATHETER, HEMODIALYSIS, DUAL LUMEN;  Surgeon: Manuel Javier MD;  Location: [facility] OR;  Service: General;  Laterality: Right;    INSERTION, CATHETER, TUNNELED Left 1/28/2023    Procedure: Insertion,catheter,tunneled;  Surgeon: Watson Fontenot MD;  Location: [facility] OR;  Service: General;  Laterality: Left;    LAPAROTOMY, EXPLORATORY N/A 1/14/2023    Procedure: LAPAROTOMY, EXPLORATORY;  Surgeon: Manuel Javier MD;  Location: [facility] OR;  Service: General;  Laterality: N/A;    LAPAROTOMY, EXPLORATORY N/A 1/16/2023    Procedure: LAPAROTOMY, EXPLORATORY;  Surgeon: Manuel Javier MD;  Location: [facility] OR;  Service: General;  Laterality: N/A;    LYMPHADENECTOMY Right 1/2/2024    Procedure: LYMPHADENECTOMY;  Surgeon: Tan Thomson MD;  Location: [address];  Service: Cardiothoracic;  Laterality: Right;    PACEMAKER, TEMPORARY, TRANSVENOUS, PEDIATRIC Right 1/12/2024    Procedure: Pacemaker, Temporary, Transvenous;  Surgeon: Todd Carlisle MD;  Location: [facility] CATH LAB;  Service: Cardiology;  Laterality: Right;    REMOVAL OF CATHETER Right 1/28/2023    Procedure: REMOVAL, CATHETER;  Surgeon: Watson Fontenot MD;  Location: [facility] OR;  Service: General;  Laterality: Right;    REMOVAL, TUNNELED CATH Right 1/25/2023    Procedure: REMOVAL, TUNNELED CATH;  Surgeon: Manuel Javier MD;  Location: [facility] OR;  Service: General;  Laterality: Right;    ROBOTIC BRONCHOSCOPY N/A 10/20/2023    Procedure: ROBOTIC BRONCHOSCOPY;  Surgeon: Mayda Monzon MD;  [facility];  Service:  Pulmonary;  Laterality: N/A;    SHOULDER SURGERY  2009 & 2010    right rotator cuff    THORACOTOMY Right 1/2/2024    Procedure: THORACOTOMY;  Surgeon: Tan Thomson MD;  Location: [facility] OR [address] [place] [address];  Service: Cardiothoracic;  Laterality: Right;    THORACOTOMY, WITH INITIAL THERAPEUTIC WEDGE RESECTION OF LUNG Right 1/2/2024    Procedure: THORACOTOMY, WITH INITIAL THERAPEUTIC WEDGE RESECTION OF LUNG;  Surgeon: Tan Thomson MD;  Location: [facility] OR [address] [place] [address];  Service: Cardiothoracic;  Laterality: Right;    TONSILLECTOMY      UPPER GASTROINTESTINAL ENDOSCOPY  04/2018     Review of patient's allergies indicates:   Allergen Reactions    Crestor [rosuvastatin] Swelling    Gluten protein        Scheduled Medications:    [START ON 1/31/2024] sodium chloride 0.9%   Intravenous Once    acetylcysteine 100 mg/ml (10%)  4 mL Nebulization TID WAKE    alteplase  2 mg Intra-Catheter Once    cefpodoxime  200 mg Oral QHS    droNABinol  5 mg Oral BID    epoetin noble (PROCRIT) injection  4,000 Units Intravenous Every Mon, Wed, Fri    erythromycin ethylsuccinate  200 mg Oral QID (WM & HS)    fluconazole  200 mg Oral QHS    heparin (porcine)  5,000 Units Subcutaneous Q8H    insulin aspart U-100  2 Units Subcutaneous TIDWM    insulin detemir U-100  4 Units Subcutaneous Daily    LIDOcaine  1 patch Transdermal Q24H    metoprolol tartrate  12.5 mg Oral BID    metroNIDAZOLE  500 mg Oral Q12H    midodrine  10 mg Oral BID WM    oxybutynin  5 mg Oral TID    pantoprazole  40 mg Oral BID AC    polyethylene glycol  17 g Oral Daily    senna-docusate 8.6-50 mg  1 tablet Oral Daily    sodium chloride 0.9%  10 mL Intravenous Q6H    venlafaxine  37.5 mg Oral Daily       PRN Medications: sodium chloride 0.9%, acetaminophen, albuterol sulfate, bisacodyL, dextrose 10%, dextrose 10%, glucagon (human recombinant), glucose, glucose, heparin (porcine), hydrALAZINE, insulin aspart U-100, midodrine, ondansetron, sodium chloride 0.9%, Flushing PICC/Midline  Protocol **AND** sodium chloride 0.9% **AND** sodium chloride 0.9%    Family History       Problem Relation (Age of Onset)    Arthritis Father    Breast cancer Mother, Paternal Aunt    Cancer Mother, Paternal Aunt    Diabetes Paternal Grandfather    Hyperlipidemia Sister    Vision loss Father, Mother, Sister          Tobacco Use    Smoking status: Never    Smokeless tobacco: Never   Substance and Sexual Activity    Alcohol use: No    Drug use: Never    Sexual activity: Not Currently     Partners: Male     Birth control/protection: See Surgical Hx     Comment: :      Review of Systems   Constitutional:  Positive for activity change.   Respiratory:  Positive for shortness of breath.    Cardiovascular:  Negative for chest pain and leg swelling.   Neurological:  Positive for weakness.     Objective:     Vital Signs (Most Recent):  Temp: 97.7 °F (36.5 °C) (01/30/24 1102)  Pulse: 77 (01/30/24 1103)  Resp: 20 (01/30/24 1102)  BP: (!) 109/54 (01/30/24 1102)  SpO2: 98 % (01/30/24 1106)    Vital Signs (24h Range):  Temp:  [96.2 °F (35.7 °C)-98.4 °F (36.9 °C)] 97.7 °F (36.5 °C)  Pulse:  [62-89] 77  Resp:  [14-20] 20  SpO2:  [96 %-100 %] 98 %  BP: (105-150)/(54-69) 109/54     Body mass index is 24.79 kg/m².     Physical Exam  Vitals and nursing note reviewed.   Constitutional:       General: She is awake.      Appearance: Normal appearance.   HENT:      Head: Normocephalic and atraumatic.   Pulmonary:      Effort: Pulmonary effort is normal. No respiratory distress.      Comments: 2 L O2 per NC  Abdominal:      General: There is no distension.      Palpations: Abdomen is soft.   Musculoskeletal:      Cervical back: Normal range of motion and neck supple.      Comments: Generalized weakness.    Skin:     General: Skin is warm and dry.      Capillary Refill: Capillary refill takes 2 to 3 seconds.   Neurological:      General: No focal deficit present.      Mental Status: She is alert and oriented to person, place, and  time.      GCS: GCS eye subscore is 4. GCS verbal subscore is 5. GCS motor subscore is 6.      Motor: Weakness present.      Gait: Gait abnormal.   Psychiatric:         Mood and Affect: Mood normal.         Behavior: Behavior normal. Behavior is cooperative.          NEUROLOGICAL EXAMINATION:     MENTAL STATUS   Oriented to person, place, and time.       Diagnostic Results: Labs: Reviewed

## 2024-01-30 NOTE — PT/OT/SLP PROGRESS
Physical Therapy Treatment    Patient Name:  Lily Green   MRN:  [id number]    Recommendations:     Discharge Recommendations: Moderate Intensity Therapy  Discharge Equipment Recommendations:  (TBD closer to d/c)  Barriers to discharge: Decreased caregiver support    Assessment:     Lily Green is a 73 y.o. female admitted with a medical diagnosis of Malignant carcinoid tumor of bronchus.  She presents with the following impairments/functional limitations: weakness, impaired balance, decreased safety awareness, impaired endurance, impaired functional mobility, gait instability, decreased lower extremity function . Pt Progressing with PT Intervention. Pt Progressing with improving gait distance however still gets SOB with mobility requiring rest breaks to recover. Pt would continue to benefit from skilled PT to address overall functional mobility, goals , and to return to functional baseline.  Goals remain appropriate.     Rehab Prognosis: Good; patient would benefit from acute skilled PT services to address these deficits and reach maximum level of function.    Recent Surgery: Procedure(s) (LRB):  EGD (ESOPHAGOGASTRODUODENOSCOPY) (N/A) 4 Days Post-Op    Plan:     During this hospitalization, patient to be seen 4 x/week to address the identified rehab impairments via gait training, therapeutic activities, therapeutic exercises, neuromuscular re-education and progress toward the following goals:    Plan of Care Expires:  02/09/24    Subjective     Chief Complaint: SOB with activity    Pain/Comfort:  Pain Rating 1: 0/10  Pain Rating Post-Intervention 1: 0/10      Objective:     Communicated with RN prior to session.  Patient found  seated on commode  with chest tube upon PT entry to room.     General Precautions: Standard, fall  Orthopedic Precautions: N/A  Braces: N/A  Respiratory Status: Room air     Functional Mobility:  Transfers:     Sit to Stand:  minimum assistance with rolling  walker  Toilet Transfer: minimum assistance with  rolling walker  using  Step Transfer  Gait: pt amb with RW x10 ft with CGA for safety      AM-PAC 6 CLICK MOBILITY  Turning over in bed (including adjusting bedclothes, sheets and blankets)?: 2  Sitting down on and standing up from a chair with arms (e.g., wheelchair, bedside commode, etc.): 3  Moving from lying on back to sitting on the side of the bed?: 2  Moving to and from a bed to a chair (including a wheelchair)?: 3  Need to walk in hospital room?: 2  Climbing 3-5 steps with a railing?: 1  Basic Mobility Total Score: 13       Treatment & Education:  Therapist provided instruction and educated of  patient on progress, safety,d/c,PT POC,   proper body mechanics, energy conservation, and fall prevention strategies during tasks listed above, on the effects of prolonged immobility and the importance of performing OOB activity and exercises to promote healing and reduce recovery time     Updated white board with appropriate PT mobility information for medical team notification   Donned an extra gown     Call nursing/pct to transfer to chair/use bathroom. Pt stated understanding  Bedside table in front of patient and area set up for function, convenience, and safety. RN aware of patient's mobility needs and status. Questions/concerns addressed within PTA scope of practice; patient  with no further questions. Time was provided for active listening, discussion of health disposition, and discussion of safe discharge. Pt?verbalized?agreement    Patient left up in chair with all lines intact, call button in reach, and nsg notified..    GOALS:   Multidisciplinary Problems       Physical Therapy Goals          Problem: Physical Therapy    Goal Priority Disciplines Outcome Goal Variances Interventions   Physical Therapy Goal     PT, PT/OT Ongoing, Progressing     Description: PT goals until 2/9/24    1. Pt supine to sit with supervision-not met  2. Pt sit to supine with  supervision-not met  3. Pt sit to stand with AD if needed with supervision-not met  4. Pt to perform gait 150 ft with AD if needed with supervision.-not met  5. Pt to go up/down curb step with AD if needed with CGA.-not met                         Time Tracking:     PT Received On: 01/30/24  PT Start Time: 0931     PT Stop Time: 0954  PT Total Time (min): 23 min     Billable Minutes: Gait Training 13 and Therapeutic Activity 10    Treatment Type: Treatment  PT/PTA: PTA     Number of PTA visits since last PT visit: 2     01/30/2024

## 2024-01-30 NOTE — PROGRESS NOTES
[facility]  Endocrinology  Progress Note    Admit Date: 1/2/2024     Reason for Consult: Management of T2DM, Hyperglycemia     Surgical Procedure and Date: 1/2/24--   LYMPHADENECTOMY (Right)  THORACOTOMY (Right)  THORACOTOMY, WITH INITIAL THERAPEUTIC WEDGE RESECTION OF LUNG (Right)    Diabetes diagnosis year: in her 50s    Home Diabetes Medications:    LDC SSI   Blood sugar 150 to 200, + 1 unit  Blood sugar 201 to 250, + 2 units  Blood sugar 251 to 300, + 3 units  Blood sugar 301 to 350, + 4 units   Blood sugar greater than 350, + 5 units    How often checking glucose at home? >4 x day   BG readings on regimen: 100-120s  Hypoglycemia on the regimen?  denies   Missed doses on regimen?  No    Diabetes Complications include:     Nephropathy     Complicating diabetes co morbidities:   ESRD and Glucocorticoid use       HPI:   Patient is a 73 y.o. female with DDD, benign essential tremor, meniere's disease, HTN, HLD, DM2, GERD, Celiac's, IBS who is found to have RML Carcinoid tumor.  Chest CT 10/18/23 revealed 1.2 x 1.1 cm right middle lobe nodule, previously 1.2 x 1.0 cm in July 2023. Underwent Robotic Bronchoscopy 10/20/23; path: RML nodule positive for well-differentiated neuroendocrine neoplasm, carcinoid tumor. PET/CT Cu64 11/17/23 showed known RML nodule with hypermetabolic activity. Now presents s/p thoracotomy with wedge resection of right lung. Endocrine consulted for BG management.         Interval HPI:   No acute events overnight. Patient in room 1051/1051 A. Blood glucose stable. BG below goal on current insulin regimen (SSI, prandial, and basal insulin ). Steroid use- None.   4 Days Post-Op  Renal function- Abnormal - 2.7 cr   Vasopressors-  None     Diet clear liquid Low Potassium     Eating:   <25%  Nausea: No  Hypoglycemia and intervention: No  Fever: No  TPN and/or TF: No      BP (!) 109/54 (BP Location: Right arm, Patient Position: Sitting)   Pulse 77   Temp 97.7 °F (36.5 °C) (Oral)   Resp 20    "Ht 5' 5" (1.651 m)   Wt 67.6 kg (149 lb)   LMP  (LMP Unknown) Comment: BEAU/ NO BSO  1986  SpO2 98%   Breastfeeding No   BMI 24.79 kg/m²     Labs Reviewed and Include    Recent Labs   Lab 01/30/24  0456   GLU 70   CALCIUM 6.9*   ALBUMIN 1.6*   PROT 3.9*      K 2.9*   CO2 19*      BUN 28*   CREATININE 2.7*   ALKPHOS 75   ALT 5*   AST 14   BILITOT 0.4     Lab Results   Component Value Date    WBC 5.62 01/30/2024    HGB 8.0 (L) 01/30/2024    HCT 25.6 (L) 01/30/2024    MCV 95 01/30/2024    PLT 85 (L) 01/30/2024     No results for input(s): "TSH", "FREET4" in the last 168 hours.  Lab Results   Component Value Date    HGBA1C 4.9 11/15/2023       Nutritional status:   Body mass index is 24.79 kg/m².  Lab Results   Component Value Date    ALBUMIN 1.6 (L) 01/30/2024    ALBUMIN 2.1 (L) 01/29/2024    ALBUMIN 2.1 (L) 01/28/2024     No results found for: "PREALBUMIN"    Estimated Creatinine Clearance: 16.7 mL/min (A) (based on SCr of 2.7 mg/dL (H)).    Accu-Checks  Recent Labs     01/27/24 2057 01/28/24  0722 01/28/24  1044 01/28/24  1547 01/28/24  2034 01/29/24  0754 01/29/24  1205 01/29/24  1538 01/29/24  2127 01/30/24  0726   POCTGLUCOSE 160* 94 139* 81 95 109 111* 272* 130* 88       Current Medications and/or Treatments Impacting Glycemic Control  Immunotherapy:    Immunosuppressants       None          Steroids:   Hormones (From admission, onward)      None          Pressors:    Autonomic Drugs (From admission, onward)      Start     Stop Route Frequency Ordered    01/23/24 1715  midodrine tablet 10 mg         -- Oral 2 times daily with meals 01/23/24 1315    01/22/24 1045  metoprolol tartrate (LOPRESSOR) split tablet 12.5 mg         -- Oral 2 times daily 01/22/24 0932    01/22/24 0930  midodrine tablet 15 mg         -- Oral As needed (PRN) 01/22/24 0930          Hyperglycemia/Diabetes Medications:   Antihyperglycemics (From admission, onward)      Start     Stop Route Frequency Ordered    01/30/24 0900  " insulin detemir U-100 (Levemir) pen 4 Units         -- SubQ Daily 01/30/24 0751    01/25/24 1130  insulin aspart U-100 pen 2 Units         -- SubQ 3 times daily with meals 01/25/24 1013    01/19/24 1232  insulin aspart U-100 pen 0-10 Units         -- SubQ Before meals & nightly PRN 01/19/24 1232            ASSESSMENT and PLAN    Renal/  ESRD (end stage renal disease)    Titrate insulin slowly to avoid hypoglycemia as the risk of hypoglycemia increases with decreased creatinine clearance.      Oncology  * Malignant carcinoid tumor of bronchus  Managed per primary team        Endocrine  Type 2 diabetes mellitus with diabetic polyneuropathy, without long-term current use of insulin  BG goal: 140-180    - Levemir 4 units daily (20% reduction due to fasting blood glucose below goal.)   - Novolog Mod dose correction with ISF 25 starting at 150 prn   - POCT Glucose before meals and at bedtime   - Hypoglycemia protocol in place      ** Please notify Endocrine for any change and/or advance in diet**  ** Please call Endocrine for any BG related issues **     Discharge Planning:   TBD. Please notify endocrinology prior to discharge.            Hue Lu PA-C  Endocrinology  [facility]

## 2024-01-30 NOTE — ASSESSMENT & PLAN NOTE
Lab Results   Component Value Date    CALCIUM 6.9 (LL) 01/30/2024    ALBUMIN 1.6 (L) 01/30/2024    CAION 1.15 01/20/2024    PHOS 3.0 01/30/2024       -F/U PO4, Mg, Calcium. And albumin levels.   -Renal diet with protein intake goal 1.5 g/kg/d with 1 L fluid restriction   -Phos 3.0  -No binders

## 2024-01-30 NOTE — PROGRESS NOTES
[facility]  Nephrology  Progress Note    Patient Name: Lily Green  MRN: [id number]  Admission Date: 1/2/2024  Hospital Length of Stay: 28 days  Attending Provider: Tan Thomson MD   Primary Care Physician: Pavel Calixto MD  Principal Problem:Malignant carcinoid tumor of bronchus    Subjective:     Interval History:   HD completed on yesterday with 1500 mL removed. No distress on exam. Electrolytes non emergent. K 2.9 sp oral replacements.     Review of patient's allergies indicates:   Allergen Reactions    Crestor [rosuvastatin] Swelling    Gluten protein      Current Facility-Administered Medications   Medication Frequency    0.9%  NaCl infusion PRN    acetaminophen tablet 650 mg Q4H PRN    acetylcysteine 100 mg/ml (10%) solution 4 mL TID WAKE    albuterol sulfate nebulizer solution 2.5 mg Q4H PRN    bisacodyL suppository 10 mg Daily PRN    cefpodoxime tablet 200 mg QHS    dextrose 10% bolus 125 mL 125 mL PRN    dextrose 10% bolus 250 mL 250 mL PRN    droNABinol capsule 5 mg BID    epoetin noble injection 4,000 Units Every Mon, Wed, Fri    erythromycin ethylsuccinate 200 mg/5 mL suspension 200 mg QID (WM & HS)    fluconazole tablet 200 mg QHS    glucagon (human recombinant) injection 1 mg PRN    glucose chewable tablet 16 g PRN    glucose chewable tablet 24 g PRN    heparin (porcine) injection 1,000 Units PRN    heparin (porcine) injection 5,000 Units Q8H    hydrALAZINE injection 10 mg Q6H PRN    insulin aspart U-100 pen 0-10 Units QID (AC + HS) PRN    insulin aspart U-100 pen 2 Units TIDWM    insulin detemir U-100 (Levemir) pen 4 Units Daily    LIDOcaine 5 % patch 1 patch Q24H    metoprolol tartrate (LOPRESSOR) split tablet 12.5 mg BID    metroNIDAZOLE tablet 500 mg Q12H    midodrine tablet 10 mg BID WM    midodrine tablet 15 mg PRN    ondansetron injection 8 mg Q8H PRN    oxybutynin tablet 5 mg TID    pantoprazole EC tablet 40 mg BID AC    polyethylene glycol packet 17 g Daily     senna-docusate 8.6-50 mg per tablet 1 tablet Daily    sodium chloride 0.9% flush 10 mL PRN    sodium chloride 0.9% flush 10 mL Q6H    And    sodium chloride 0.9% flush 10 mL PRN    venlafaxine tablet 37.5 mg Daily       Objective:     Vital Signs (Most Recent):  Temp: 97.9 °F (36.6 °C) (01/30/24 0721)  Pulse: 72 (01/30/24 0804)  Resp: 18 (01/30/24 0804)  BP: 130/62 (01/30/24 0721)  SpO2: 97 % (01/30/24 0804) Vital Signs (24h Range):  Temp:  [96.2 °F (35.7 °C)-98.4 °F (36.9 °C)] 97.9 °F (36.6 °C)  Pulse:  [62-89] 72  Resp:  [14-18] 18  SpO2:  [96 %-100 %] 97 %  BP: (105-150)/(56-70) 130/62     Weight: 67.6 kg (149 lb) (01/25/24 0600)  Body mass index is 24.79 kg/m².  Body surface area is 1.76 meters squared.    I/O last 3 completed shifts:  In: 800 [P.O.:500; Other:300]  Out: 2046 [Other:2046]     Physical Exam  Vitals and nursing note reviewed.   Constitutional:       Appearance: Normal appearance.   HENT:      Head: Normocephalic and atraumatic.      Nose: Nose normal.   Cardiovascular:      Rate and Rhythm: Normal rate and regular rhythm.      Pulses: Normal pulses.   Pulmonary:      Effort: Pulmonary effort is normal. No respiratory distress.      Comments: Right posterior chest incision c/d/I  Right chest permacath    Abdominal:      General: Abdomen is flat. There is no distension.      Palpations: Abdomen is soft.      Tenderness: There is no abdominal tenderness.      Comments: Soft, nontender abdomen   Musculoskeletal:      Right lower leg: Edema present.      Left lower leg: Edema present.   Skin:     General: Skin is warm and dry.      Coloration: Skin is pale.      Comments: Groin soft, dressings c/d/i   Neurological:      General: No focal deficit present.      Mental Status: She is alert.          Significant Labs:  CBC:   Recent Labs   Lab 01/30/24  0456   WBC 5.62   RBC 2.70*   HGB 8.0*   HCT 25.6*   PLT 85*   MCV 95   MCH 29.6   MCHC 31.3*     CMP:   Recent Labs   Lab 01/30/24  0456   GLU 70    CALCIUM 6.9*   ALBUMIN 1.6*   PROT 3.9*      K 2.9*   CO2 19*      BUN 28*   CREATININE 2.7*   ALKPHOS 75   ALT 5*   AST 14   BILITOT 0.4     All labs within the past 24 hours have been reviewed.     Assessment/Plan:     Renal/  Chronic kidney disease-mineral and bone disorder  Lab Results   Component Value Date    CALCIUM 6.9 (LL) 01/30/2024    ALBUMIN 1.6 (L) 01/30/2024    CAION 1.15 01/20/2024    PHOS 3.0 01/30/2024       -F/U PO4, Mg, Calcium. And albumin levels.   -Renal diet with protein intake goal 1.5 g/kg/d with 1 L fluid restriction   -Phos 3.0  -No binders     ESRD (end stage renal disease)  S/p R thoracotomy due to RML carcinoid tumor. On HD ~ 1 year. Last HD prior to presentation 1/1/24. Pt has NELY AVF placed on 10/23 that has not been cleared by vascular. Currently using RIJ TDC.     During her admission: stepped up to ICU for Afib with RVR. Was started on BB, amio and digoxin. However 1/12/24, had multiple episodes of eulogio with few asystole requiring chest compressions. Digoxin level elevated and dig-meaghan was given with resulting decrease in levels.     Nephrology History  iHD Schedule: TTS   Unit/MD: Timbo Hinds/ Dr. Vyas  Duration: 3.5 hours   EDW: 58 kg   Access: RIJ TDC  Residual Renal Function: minimal     Assessment:     -No plans for HD today. Will plan for HD tomorrow for clearance and volume management.   -Strict ins and outs  -Avoid nephrotoxic agents if possible and renally dose medications  -Avoid drastic hemodynamic changes if possible      Oncology  * Malignant carcinoid tumor of bronchus  -management per primary     Anemia in ESRD (end-stage renal disease)  Recent Labs   Lab 01/28/24  0559 01/29/24  0426 01/30/24  0456   WBC 9.29 8.44 5.62   HGB 9.4* 9.2* 8.0*   HCT 29.8* 29.2* 25.6*   * 141* 85*       - Target Hgb 10-11 gm/dL. Hgb 8.0.  - Continue EPO        Thank you for your consult. I will follow-up with patient. Please contact us if you have any  additional questions.    Makenzie Melissa DNP, FNP-C  Nephrology  [facility]

## 2024-01-30 NOTE — PLAN OF CARE
"[facility] Plan of Care Note    Dx:   Malignant carcinoid tumor of bronchus [C7A.090]  Carcinoid tumor of lung [D3A.090]    Shift Events: No significant events     Goals of Care: Comfort, safety    Neuro: AAO    Vital Signs: /62 (BP Location: Right arm, Patient Position: Lying)   Pulse 68   Temp 97.9 °F (36.6 °C) (Oral)   Resp 16   Ht 5' 5" (1.651 m)   Wt 67.6 kg (149 lb)   LMP  (LMP Unknown) Comment: BEAU/ NO BSO  1986  SpO2 97%   Breastfeeding No   BMI 24.79 kg/m²     Respiratory: 2L NC    Diet: Diet clear liquid Low Potassium  Dietary nutrition supplements Novasource Renal - Vanilla    Is patient tolerating current diet? Yes; some transient nausea    GTTS: None    Urine Output/Bowel Movement: Per charting; LBM 1/29, oliguric (HD patient)      Drains/Tubes/Tube Feeds (include total output/shift):   No intake/output data recorded.      Lines: R chest HD cath, TON fistula, NELY midline    Accuchecks: ACHS    Skin: R back/ side incision/ prior chest tube site    Fall Risk Score: Per charting    Activity level? Edge of bed activities with assist, pivot with heavy assist    Any scheduled procedures? None    Any safety concerns? Fall precautions    Other: None   "

## 2024-01-30 NOTE — ANESTHESIA POSTPROCEDURE EVALUATION
Anesthesia Post Evaluation    Patient: Lily Green    Procedure(s) Performed: Procedure(s) (LRB):  EGD (ESOPHAGOGASTRODUODENOSCOPY) (N/A)    Final Anesthesia Type: general      Patient location during evaluation: PACU  Patient participation: Yes- Able to Participate  Level of consciousness: awake and alert  Post-procedure vital signs: reviewed and stable  Pain management: adequate  Airway patency: patent  NONI mitigation strategies: Extubation while patient is awake and Multimodal analgesia  PONV status at discharge: No PONV  Anesthetic complications: no      Cardiovascular status: stable  Respiratory status: unassisted and spontaneous ventilation  Hydration status: euvolemic  Follow-up not needed.              Vitals Value Taken Time   /62 01/30/24 0721   Temp 36.6 °C (97.9 °F) 01/30/24 0721   Pulse 68 01/30/24 0727   Resp 16 01/30/24 0721   SpO2 97 % 01/30/24 0721         Event Time   Out of Recovery 12:20:56         Pain/Meredith Score: Pain Rating Prior to Med Admin: 3 (1/29/2024  9:27 PM)

## 2024-01-30 NOTE — ASSESSMENT & PLAN NOTE
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 Stepped up to the ICU 1/5 for afib RVR which was refractory to medical management and c/b development of tachy-eulogio syndrome now s/p micra placement 1/16 for tachy-eulogio syndrome in the setting of RVR. Did have brief SBO type picture 1/7 which resolved with conservative management.     - s/p R thoracotomy with R lower lobectomy for carcinoid w/ MLND; IPV injury requiring repair. CT removed post operatively.  - Refractory afib RVR requiring ICU step up 1/05, s/p chemical cardioversion initially with recurrence c/b developing tachy-eulogio syndrome    - cards and EP following, appreciate assistance            - AC indicated 2/2 intermittent atrial fib episodes and chadsvasc score of 2     - s/p TVP placement and now micra implantation 1/16    - Heparin gtt resumed 1/22; will need to transition to eliquis likely following CT removal; held 1/23 2/2 GI bleed     - Heparin ppx 1/29  - s/p R chest tube placement with IR 1/17, L chest tube placement 1/18; L CT removed 1/25; Last R CT out 1/28  - Pulmonary toilet: IS, Acapella, Mucomyst daily; CPT ordered  - Infectious work up 2/2 leukocytosis. She remains afebrile. Effusions and compressive atelectasis also likely contributing to leukocytosis.Trended up to 30k 1/16, now downtrending. Zosyn 1/11-1/15; Meropenam 1/16 - 1/20; Plan for Cefpodoxime and metronidazole to start 1/21 per ID. 4 week abx plan for empiric treatment of presumed parapneumonic effusion - end date 2/15/2024.    - ID following, appreciate assistance. Reconsult following thora results.   - Bcx 1/11: Staph, probable contaminant  - Bcx1/12: No growth  - Rapid blood PCR: staph epi  - Sputum Cx 1/11: klebsiella & e. Coli  - R pleural fluid Cx 1/17: No growth (fungal pending  still, neg KOH)  - SBO type episode 1/07 requiring NGT decompression, which resolved with conservative management   - GI bleed 1/23/2024 - heparin held, 2uPRBC, 1uFFP, heparin reversed. GI consulted                       - EGD 1/24 - could not visualize stomach much 2/2 food residue; EGD aborted                       - 2u PRBC overnight 1/25 for Hgb 6.3, slow downtrend. Repeat EGD 1/26 demonstrates non-bleeding ulcers    - Continue protonix. GI signed off. Repeat scope in 8 weeks. F/u H pylori   - clear liquid diet, novasource renal BID; marinol for appetite. Will discuss advancing diet  - Bowel regimen, PPI  - Nephrology following, appreciate assistance. CRRT 1/17 overnight. HD trial 1/19 passed but requiring midodrine, 1.5 L LR, metoprolol for RVR. HD 1/26           - LUE AVF created 10.2023, US 1/22/2023 with good flow metrics, okay to use per vascular   - Oxybutinin for bladder spasms   - MM pain control as needed  - Continue PT/OT, mobilization, likely post hospital placement; engaged social work

## 2024-01-30 NOTE — ASSESSMENT & PLAN NOTE
Recent Labs   Lab 01/28/24  0559 01/29/24  0426 01/30/24  0456   WBC 9.29 8.44 5.62   HGB 9.4* 9.2* 8.0*   HCT 29.8* 29.2* 25.6*   * 141* 85*       - Target Hgb 10-11 gm/dL. Hgb 8.0.  - Continue EPO

## 2024-01-30 NOTE — PROGRESS NOTES
01/29/24 1210   Post-Hemodialysis Assessment   Rinseback Volume (mL) 250 mL   Blood Volume Processed (Liters) 64.7 L   Dialyzer Clearance Lightly streaked   Duration of Treatment 210 minutes   Additional Fluid Intake (mL) 300 mL   Total UF (mL) 2046 mL   Net Fluid Removal 1500   Patient Response to Treatment tolerated well   Post-Treatment Weight   (unable to stand on scale)   Post-Hemodialysis Comments see notes     HD TX complete. Pt AAO, VSS. NAD. Net removal 1500 ml. Report given to primary nurse. Pt returned to room via stretcher escorted by transport staff.

## 2024-01-30 NOTE — PT/OT/SLP PROGRESS
Occupational Therapy   Treatment    Name: Lily Green  MRN: [id number]  Admitting Diagnosis:  Malignant carcinoid tumor of bronchus  4 Days Post-Op    Recommendations:     Discharge Recommendations: Moderate Intensity Therapy  Discharge Equipment Recommendations:  to be determined by next level of care  Barriers to discharge:  None    Assessment:     Lily Green is a 73 y.o. female with a medical diagnosis of Malignant carcinoid tumor of bronchus.  She presents with fatigue and SOB with activity. Performance deficits affecting function are weakness, impaired endurance, impaired self care skills, impaired functional mobility, gait instability, impaired cardiopulmonary response to activity, impaired balance, decreased safety awareness. Pt had good participation in therapy. Pt limited by SOB and decreased activity tolerance.     Rehab Prognosis:  Good; patient would benefit from acute skilled OT services to address these deficits and reach maximum level of function.       Plan:     Patient to be seen 4 x/week to address the above listed problems via self-care/home management, therapeutic activities, therapeutic exercises, neuromuscular re-education  Plan of Care Expires: 02/02/24  Plan of Care Reviewed with: patient    Subjective     Chief Complaint: SOB  Patient/Family Comments/goals: return to PLOF  Pain/Comfort:  Pain Rating 1: 0/10    Objective:     Communicated with: Nsg prior to session.  Patient found  toileting using BSC  with peripheral IV, telemetry, oxygen, SCD with Tech and volunteer present upon OT entry to room.    General Precautions: Standard, fall    Orthopedic Precautions:N/A  Braces: N/A  Respiratory Status: Nasal cannula, flow 2 L/min     Occupational Performance:      Functional Mobility/Transfers:  Patient completed 2 trials Sit <> Stand Transfer with minimum assistance and moderate assistance  with  rolling walker with trials both from BSC   Patient completed BSC > Chair  Transfer using Step Transfer technique with minimum assistance with rolling walker  Functional Mobility: Pt ambulated room level using RW c/ CGA    Activities of Daily Living:  Grooming: setup A facial H using warm wash cloth  Upper Body Dressing: moderate assistance don gown for backside  Lower Body Dressing: total assistance doff socks  Toileting: total assistance perianal care      [facility] 6 Click ADL: 14    Treatment & Education:  Pt educated on role and purpose of therapy  Pt educated on goal setting  Pt educated on benefits of OOB activity  Pt educated on self advocacy     Patient left up in chair with B LE elevated and arms offloaded 2/2 weeping, all lines intact, call button in reach, nsg notified, and PCT present    GOALS:   Multidisciplinary Problems       Occupational Therapy Goals          Problem: Occupational Therapy    Goal Priority Disciplines Outcome Interventions   Occupational Therapy Goal     OT, PT/OT Ongoing, Progressing    Description: Goals to be met by: 02/2/24     Patient will increase functional independence with ADLs by performing:    UE Dressing with Supervision.  Grooming while standing at sink with Modified [place].  Toileting from toilet with Modified [place] for hygiene and clothing management.   Supine to sit with Supervision.  Toilet transfer to toilet with Supervision.                         Time Tracking:     OT Date of Treatment: 01/30/24  OT Start Time: 0931  OT Stop Time: 0955  OT Total Time (min): 24 min    Billable Minutes:Self Care/Home Management 24    OT/RICHARD: OT          1/30/2024

## 2024-01-30 NOTE — SUBJECTIVE & OBJECTIVE
"Interval HPI:   No acute events overnight. Patient in room 1051/1051 A. Blood glucose stable. BG below goal on current insulin regimen (SSI, prandial, and basal insulin ). Steroid use- None.   4 Days Post-Op  Renal function- Abnormal - 2.7 cr   Vasopressors-  None     Diet clear liquid Low Potassium     Eating:   <25%  Nausea: No  Hypoglycemia and intervention: No  Fever: No  TPN and/or TF: No      BP (!) 109/54 (BP Location: Right arm, Patient Position: Sitting)   Pulse 77   Temp 97.7 °F (36.5 °C) (Oral)   Resp 20   Ht 5' 5" (1.651 m)   Wt 67.6 kg (149 lb)   LMP  (LMP Unknown) Comment: BEAU/ NO BSO  1986  SpO2 98%   Breastfeeding No   BMI 24.79 kg/m²     Labs Reviewed and Include    Recent Labs   Lab 01/30/24  0456   GLU 70   CALCIUM 6.9*   ALBUMIN 1.6*   PROT 3.9*      K 2.9*   CO2 19*      BUN 28*   CREATININE 2.7*   ALKPHOS 75   ALT 5*   AST 14   BILITOT 0.4     Lab Results   Component Value Date    WBC 5.62 01/30/2024    HGB 8.0 (L) 01/30/2024    HCT 25.6 (L) 01/30/2024    MCV 95 01/30/2024    PLT 85 (L) 01/30/2024     No results for input(s): "TSH", "FREET4" in the last 168 hours.  Lab Results   Component Value Date    HGBA1C 4.9 11/15/2023       Nutritional status:   Body mass index is 24.79 kg/m².  Lab Results   Component Value Date    ALBUMIN 1.6 (L) 01/30/2024    ALBUMIN 2.1 (L) 01/29/2024    ALBUMIN 2.1 (L) 01/28/2024     No results found for: "PREALBUMIN"    Estimated Creatinine Clearance: 16.7 mL/min (A) (based on SCr of 2.7 mg/dL (H)).    Accu-Checks  Recent Labs     01/27/24 2057 01/28/24  0722 01/28/24  1044 01/28/24  1547 01/28/24  2034 01/29/24  0754 01/29/24  1205 01/29/24  1538 01/29/24  2127 01/30/24  0726   POCTGLUCOSE 160* 94 139* 81 95 109 111* 272* 130* 88       Current Medications and/or Treatments Impacting Glycemic Control  Immunotherapy:    Immunosuppressants       None          Steroids:   Hormones (From admission, onward)      None          Pressors:    Autonomic " Drugs (From admission, onward)      Start     Stop Route Frequency Ordered    01/23/24 1715  midodrine tablet 10 mg         -- Oral 2 times daily with meals 01/23/24 1315    01/22/24 1045  metoprolol tartrate (LOPRESSOR) split tablet 12.5 mg         -- Oral 2 times daily 01/22/24 0932    01/22/24 0930  midodrine tablet 15 mg         -- Oral As needed (PRN) 01/22/24 0930          Hyperglycemia/Diabetes Medications:   Antihyperglycemics (From admission, onward)      Start     Stop Route Frequency Ordered    01/30/24 0900  insulin detemir U-100 (Levemir) pen 4 Units         -- SubQ Daily 01/30/24 0751    01/25/24 1130  insulin aspart U-100 pen 2 Units         -- SubQ 3 times daily with meals 01/25/24 1013    01/19/24 1232  insulin aspart U-100 pen 0-10 Units         -- SubQ Before meals & nightly PRN 01/19/24 1232

## 2024-01-30 NOTE — PROGRESS NOTES
[facility] - [facility]  General Surgery  Progress Note    Subjective:     History of Present Illness:  No notes on file    Post-Op Info:  Procedure(s) (LRB):  EGD (ESOPHAGOGASTRODUODENOSCOPY) (N/A)   4 Days Post-Op     Interval History:   -NAEO  -2 additional Bms yesterday which were improved in color  -iHD yesterday  -Labs appear dilutional this morning  -Still with very minimal appetite    Medications:  Continuous Infusions:  Scheduled Meds:   acetylcysteine 100 mg/ml (10%)  4 mL Nebulization TID WAKE    cefpodoxime  200 mg Oral QHS    droNABinol  5 mg Oral BID    epoetin noble (PROCRIT) injection  4,000 Units Intravenous Every Mon, Wed, Fri    erythromycin ethylsuccinate  200 mg Oral QID (WM & HS)    fluconazole  200 mg Oral QHS    heparin (porcine)  5,000 Units Subcutaneous Q8H    insulin aspart U-100  2 Units Subcutaneous TIDWM    insulin detemir U-100  6 Units Subcutaneous Daily    LIDOcaine  1 patch Transdermal Q24H    metoprolol tartrate  12.5 mg Oral BID    metroNIDAZOLE  500 mg Oral Q12H    midodrine  10 mg Oral BID WM    oxybutynin  5 mg Oral TID    pantoprazole  40 mg Oral BID AC    polyethylene glycol  17 g Oral Daily    senna-docusate 8.6-50 mg  1 tablet Oral Daily    sodium chloride 0.9%  10 mL Intravenous Q6H    venlafaxine  37.5 mg Oral Daily     PRN Meds:sodium chloride 0.9%, acetaminophen, albuterol sulfate, bisacodyL, dextrose 10%, dextrose 10%, glucagon (human recombinant), glucose, glucose, heparin (porcine), hydrALAZINE, insulin aspart U-100, midodrine, ondansetron, sodium chloride 0.9%, Flushing PICC/Midline Protocol **AND** sodium chloride 0.9% **AND** sodium chloride 0.9%     Review of patient's allergies indicates:   Allergen Reactions    Crestor [rosuvastatin] Swelling    Gluten protein      Objective:     Vital Signs (Most Recent):  Temp: 98.4 °F (36.9 °C) (01/30/24 0346)  Pulse: 66 (01/30/24 0346)  Resp: 18 (01/30/24 0346)  BP: 138/66 (01/30/24 0346)  SpO2: 96 % (01/30/24 5196) Vital Signs  (24h Range):  Temp:  [96.2 °F (35.7 °C)-98.4 °F (36.9 °C)] 98.4 °F (36.9 °C)  Pulse:  [62-89] 66  Resp:  [14-18] 18  SpO2:  [96 %-100 %] 96 %  BP: (105-150)/(56-82) 138/66     Weight: 67.6 kg (149 lb)  Body mass index is 24.79 kg/m².    Intake/Output - Last 3 Shifts         01/28 0700 01/29 0659 01/29 0700 01/30 0659 01/30 0700 01/31 0659    P.O. 300 200     Other  300     Total Intake(mL/kg) 300 (4.4) 500 (7.4)     Other  2046     Total Output  2046     Net +300 -1546            Urine Occurrence 1 x 1 x     Stool Occurrence 2 x 2 x     Emesis Occurrence  0 x              Physical Exam  Vitals and nursing note reviewed.   Constitutional:       Appearance: Normal appearance.   HENT:      Head: Normocephalic and atraumatic.      Nose: Nose normal.   Cardiovascular:      Rate and Rhythm: Normal rate and regular rhythm.      Pulses: Normal pulses.   Pulmonary:      Effort: Pulmonary effort is normal. No respiratory distress.      Comments: Right posterior chest incision c/d/I  Right chest permacath    Abdominal:      General: Abdomen is flat. There is no distension.      Palpations: Abdomen is soft.      Tenderness: There is no abdominal tenderness.      Comments: Soft, nontender abdomen   Musculoskeletal:      Right lower leg: No edema.      Left lower leg: No edema.   Skin:     General: Skin is warm and dry.      Comments: Groin soft, dressings c/d/i   Neurological:      General: No focal deficit present.      Mental Status: She is alert.          Significant Labs:  I have reviewed all pertinent lab results within the past 24 hours.  CBC:   Recent Labs   Lab 01/30/24  0456   WBC 5.62   RBC 2.70*   HGB 8.0*   HCT 25.6*   PLT 85*   MCV 95   MCH 29.6   MCHC 31.3*     CMP:   Recent Labs   Lab 01/30/24  0456   GLU 70   CALCIUM 6.9*   ALBUMIN 1.6*   PROT 3.9*      K 2.9*   CO2 19*      BUN 28*   CREATININE 2.7*   ALKPHOS 75   ALT 5*   AST 14   BILITOT 0.4       Significant Diagnostics:  I have reviewed all  pertinent imaging results/findings within the past 24 hours.  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 Stepped up to the ICU 1/5 for afib RVR which was refractory to medical management and c/b development of tachy-eulogio syndrome now s/p micra placement 1/16 for tachy-eulogio syndrome in the setting of RVR. Did have brief SBO type picture 1/7 which resolved with conservative management.     - s/p R thoracotomy with R lower lobectomy for carcinoid w/ MLND; IPV injury requiring repair. CT removed post operatively.  - Refractory afib RVR requiring ICU step up 1/05, s/p chemical cardioversion initially with recurrence c/b developing tachy-eulogio syndrome    - cards and EP following, appreciate assistance            - AC indicated 2/2 intermittent atrial fib episodes and chadsvasc score of 2     - s/p TVP placement and now micra implantation 1/16    - Heparin gtt resumed 1/22; will need to transition to eliquis likely following CT removal; held 1/23 2/2 GI bleed     - Heparin ppx 1/29  - s/p R chest tube placement with IR 1/17, L chest tube placement 1/18; L CT removed 1/25; Last R CT out 1/28  - Pulmonary toilet: IS, Acapella, Mucomyst daily; CPT ordered  - Infectious work up 2/2 leukocytosis. She remains afebrile. Effusions and compressive atelectasis also likely contributing to leukocytosis.Trended up to 30k 1/16, now downtrending. Zosyn 1/11-1/15; Meropenam 1/16 - 1/20; Plan for Cefpodoxime and metronidazole to start 1/21 per ID. 4 week abx plan for empiric treatment of presumed parapneumonic effusion - end date 2/15/2024.    - ID following, appreciate assistance. Reconsult following thora results.   - Bcx 1/11: Staph, probable contaminant  - Bcx1/12: No growth  -  Rapid blood PCR: staph epi  - Sputum Cx 1/11: klebsiella & e. Coli  - R pleural fluid Cx 1/17: No growth (fungal pending still, neg KOH)  - SBO type episode 1/07 requiring NGT decompression, which resolved with conservative management   - GI bleed 1/23/2024 - heparin held, 2uPRBC, 1uFFP, heparin reversed. GI consulted                       - EGD 1/24 - could not visualize stomach much 2/2 food residue; EGD aborted                       - 2u PRBC overnight 1/25 for Hgb 6.3, slow downtrend. Repeat EGD 1/26 demonstrates non-bleeding ulcers    - Continue protonix. GI signed off. Repeat scope in 8 weeks. F/u H pylori   - clear liquid diet, novasource renal BID; marinol for appetite. Will discuss advancing diet  - Bowel regimen, PPI  - Nephrology following, appreciate assistance. CRRT 1/17 overnight. HD trial 1/19 passed but requiring midodrine, 1.5 L LR, metoprolol for RVR. HD 1/26           - LUE AVF created 10.2023, US 1/22/2023 with good flow metrics, okay to use per vascular   - Oxybutinin for bladder spasms   - MM pain control as needed  - Continue PT/OT, mobilization, likely post hospital placement; engaged social work        Mike Lam MD  General Surgery  [facility]

## 2024-01-30 NOTE — HPI
Per chart review,  74 yo female with ESRD (TTS), DDD, benign essential tremor, meniere's disease, HTN, HLD, DM2, GERD, Celiac's, IBS and 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  Pt's hospital course was complicated by  Afib with RVR which was managed with chemical cardioversion. Pt then, developed tachy/ eulogio syndrome.Cardiology and EP were consulted and pt is now S/P Pacemaker placement 01/16/2024. Pt's course was further complicated by Leukocytosis. ID was consulted and she is S/P IV antibiotics.She is now completing PO antibiotics. Pt had SBO which required NGT. That had resolved now. On 01/23, pt had GIB and GI was consulted. EGD was completed on 01/26. Pt is S/P 2 units of PRBCs. Nephrology is following pt for ESRD. Pt required CRRT in her earlier course. She is now tolerating HD. PM &R was consulted to evaluate pt for IPR placement.         Functional History: Patient lives alone  in a single  story home with one step to enter.  Prior to admission, Pt was I with mobility and requiring assistance with ADLs.Has a caregiver that comes 3 days a week. Spouse is involved in pt's care, but does not live with pt. DME: None.

## 2024-01-30 NOTE — CONSULTS
[facility]  Physical Medicine & Rehab  Consult Note    Patient Name: Lily Green  MRN: [id number]  Admission Date: 2024  Hospital Length of Stay: 28 days  Attending Physician: Tan Thomson MD   Consults  Subjective:     Principal Problem: Malignant carcinoid tumor of bronchus    HPI: Per chart review,  72 yo female with ESRD (TTS), DDD, benign essential tremor, meniere's disease, HTN, HLD, DM2, GERD, Celiac's, IBS and carcinoid tumor of the right middle lobe s/p thoracotomy and resection with mediastinal lymph node dissection 2023.  Pt's hospital course was complicated by  Afib with RVR which was managed with chemical cardioversion. Pt then, developed tachy/ eulogio syndrome.Cardiology and EP were consulted and pt is now S/P Pacemaker placement 2024. Pt's course was further complicated by Leukocytosis. ID was consulted and she is S/P IV antibiotics.She is now completing PO antibiotics. Pt had SBO which required NGT. That had resolved now. On , pt had GIB and GI was consulted. EGD was completed on . Pt is S/P 2 units of PRBCs. Nephrology is following pt for ESRD. Pt required CRRT in her earlier course. She is now tolerating HD. PM &R was consulted to evaluate pt for IPR placement.         Functional History: Patient lives alone  in a single  story home with one step to enter.  Prior to admission, Pt was I with mobility and requiring assistance with ADLs.Has a caregiver that comes 3 days a week. Spouse is involved in pt's care, but does not live with pt. DME: None.     Hospital Course: No notes on file    Past Medical History:   Diagnosis Date    Anxiety     Celiac disease 2018    Celiac disease     Depression     Diabetes mellitus     Family history of breast cancer in mother      at age 68    Hyperlipidemia     Hypertension     Meniere disease     Meniere's disease, unspecified ear     Menopause     Peptic ulcer     Reflux esophagitis     Urinary tract infection      Vaginal infection     Vaginal Pap smear 04/07/2014    Pap/Hpv Negative      Past Surgical History:   Procedure Laterality Date    APPENDECTOMY      BREAST SURGERY      Tags    CARPAL TUNNEL RELEASE Bilateral 09/2017    ,     CLOSURE, COLOSTOMY Left 1/16/2023    Procedure: CLOSURE, COLOSTOMY;  Surgeon: Manuel Javier MD;  Location: [facility] OR;  Service: General;  Laterality: Left;    COLONOSCOPY  04/2018    Normal  ([place])     COLOSTOMY Right 1/16/2023    Procedure: CREATION, COLOSTOMY;  Surgeon: Manuel Javier MD;  Location: [facility] OR;  Service: General;  Laterality: Right;    DILATION AND CURETTAGE OF UTERUS  1986    DUPUYTREN CONTRACTURE RELEASE Bilateral 09/2017    ESOPHAGOGASTRODUODENOSCOPY N/A 1/24/2024    Procedure: EGD (ESOPHAGOGASTRODUODENOSCOPY);  Surgeon: Yamilet Walters MD;  [facility]  Service: Gastroenterology;  Laterality: N/A;    ESOPHAGOGASTRODUODENOSCOPY N/A 1/26/2024    Procedure: EGD (ESOPHAGOGASTRODUODENOSCOPY);  Surgeon: Yamilet Walters MD;  [facility]  Service: Gastroenterology;  Laterality: N/A;    HYSTERECTOMY  1987    BEAU w/ appy, no BSO     IMPLANTATION OF LEADLESS PACEMAKER N/A 1/16/2024    Procedure: INSERTION, CARDIAC PACEMAKER, LEADLESS;  Surgeon: LENIN Frances MD;  Location: [facility] EP LAB;  Service: Cardiology;  Laterality: N/A;  SB, LEADLESS PPM (MICRA), MDT, ANES, EH, CVICU [id number]    INJECTION OF NEUROLYTIC AGENT AROUND LUMBAR SYMPATHETIC NERVE N/A 1/6/2022    Procedure: BLOCK, LUMBAR SYMPATHETIC;  Surgeon: Souleymane Rizo Jr., MD;  Location: [facility] PAIN MGT;  Service: Pain Management;  Laterality: N/A;  no pacemaker   pt is diabetic     INJECTION OF NEUROLYTIC AGENT AROUND LUMBAR SYMPATHETIC NERVE N/A 8/25/2022    Procedure: BLOCK, LUMBAR SYMPATHETIC;  Surgeon: Souleymane Rizo Jr., MD;  Location: [facility] PAIN MGT;  Service: Pain Management;  Laterality: N/A;  diabetic     INSERTION OF TUNNELED CENTRAL VENOUS HEMODIALYSIS  CATHETER Right 1/25/2023    Procedure: INSERTION, CATHETER, HEMODIALYSIS, DUAL LUMEN;  Surgeon: Manuel Javier MD;  Location: [facility] OR;  Service: General;  Laterality: Right;    INSERTION, CATHETER, TUNNELED Left 1/28/2023    Procedure: Insertion,catheter,tunneled;  Surgeon: Watson Fontenot MD;  Location: [facility] OR;  Service: General;  Laterality: Left;    LAPAROTOMY, EXPLORATORY N/A 1/14/2023    Procedure: LAPAROTOMY, EXPLORATORY;  Surgeon: Manuel Javier MD;  Location: [facility] OR;  Service: General;  Laterality: N/A;    LAPAROTOMY, EXPLORATORY N/A 1/16/2023    Procedure: LAPAROTOMY, EXPLORATORY;  Surgeon: Manuel Javier MD;  Location: [facility] OR;  Service: General;  Laterality: N/A;    LYMPHADENECTOMY Right 1/2/2024    Procedure: LYMPHADENECTOMY;  Surgeon: Tan Thomson MD;  Location: [facility] OR 2ND FLR;  Service: Cardiothoracic;  Laterality: Right;    PACEMAKER, TEMPORARY, TRANSVENOUS, PEDIATRIC Right 1/12/2024    Procedure: Pacemaker, Temporary, Transvenous;  Surgeon: Todd Carlisle MD;  Location: [facility] CATH LAB;  Service: Cardiology;  Laterality: Right;    REMOVAL OF CATHETER Right 1/28/2023    Procedure: REMOVAL, CATHETER;  Surgeon: Watson Fontenot MD;  Location: [facility] OR;  Service: General;  Laterality: Right;    REMOVAL, TUNNELED CATH Right 1/25/2023    Procedure: REMOVAL, TUNNELED CATH;  Surgeon: Manuel Javier MD;  Location: [facility] OR;  Service: General;  Laterality: Right;    ROBOTIC BRONCHOSCOPY N/A 10/20/2023    Procedure: ROBOTIC BRONCHOSCOPY;  Surgeon: Mayda Monzon MD;  Location: [facility] OR 2ND FLR;  Service: Pulmonary;  Laterality: N/A;    SHOULDER SURGERY  2009 & 2010    right rotator cuff    THORACOTOMY Right 1/2/2024    Procedure: THORACOTOMY;  Surgeon: Tan Thomson MD;  Location: [facility] OR 2ND FLR;  Service: Cardiothoracic;  Laterality: Right;    THORACOTOMY, WITH INITIAL THERAPEUTIC WEDGE RESECTION OF LUNG Right 1/2/2024    Procedure: THORACOTOMY, WITH INITIAL THERAPEUTIC WEDGE  RESECTION OF LUNG;  Surgeon: Tan Thomson MD;  Location: [facility] OR [address] [place] [address];  Service: Cardiothoracic;  Laterality: Right;    TONSILLECTOMY      UPPER GASTROINTESTINAL ENDOSCOPY  04/2018     Review of patient's allergies indicates:   Allergen Reactions    Crestor [rosuvastatin] Swelling    Gluten protein        Scheduled Medications:    [START ON 1/31/2024] sodium chloride 0.9%   Intravenous Once    acetylcysteine 100 mg/ml (10%)  4 mL Nebulization TID WAKE    alteplase  2 mg Intra-Catheter Once    cefpodoxime  200 mg Oral QHS    droNABinol  5 mg Oral BID    epoetin noble (PROCRIT) injection  4,000 Units Intravenous Every Mon, Wed, Fri    erythromycin ethylsuccinate  200 mg Oral QID (WM & HS)    fluconazole  200 mg Oral QHS    heparin (porcine)  5,000 Units Subcutaneous Q8H    insulin aspart U-100  2 Units Subcutaneous TIDWM    insulin detemir U-100  4 Units Subcutaneous Daily    LIDOcaine  1 patch Transdermal Q24H    metoprolol tartrate  12.5 mg Oral BID    metroNIDAZOLE  500 mg Oral Q12H    midodrine  10 mg Oral BID WM    oxybutynin  5 mg Oral TID    pantoprazole  40 mg Oral BID AC    polyethylene glycol  17 g Oral Daily    senna-docusate 8.6-50 mg  1 tablet Oral Daily    sodium chloride 0.9%  10 mL Intravenous Q6H    venlafaxine  37.5 mg Oral Daily       PRN Medications: sodium chloride 0.9%, acetaminophen, albuterol sulfate, bisacodyL, dextrose 10%, dextrose 10%, glucagon (human recombinant), glucose, glucose, heparin (porcine), hydrALAZINE, insulin aspart U-100, midodrine, ondansetron, sodium chloride 0.9%, Flushing PICC/Midline Protocol **AND** sodium chloride 0.9% **AND** sodium chloride 0.9%    Family History       Problem Relation (Age of Onset)    Arthritis Father    Breast cancer Mother, Paternal Aunt    Cancer Mother, Paternal Aunt    Diabetes Paternal Grandfather    Hyperlipidemia Sister    Vision loss Father, Mother, Sister          Tobacco Use    Smoking status: Never    Smokeless tobacco: Never    Substance and Sexual Activity    Alcohol use: No    Drug use: Never    Sexual activity: Not Currently     Partners: Male     Birth control/protection: See Surgical Hx     Comment: :      Review of Systems   Constitutional:  Positive for activity change.   Respiratory:  Positive for shortness of breath.    Cardiovascular:  Negative for chest pain and leg swelling.   Neurological:  Positive for weakness.     Objective:     Vital Signs (Most Recent):  Temp: 97.7 °F (36.5 °C) (01/30/24 1102)  Pulse: 77 (01/30/24 1103)  Resp: 20 (01/30/24 1102)  BP: (!) 109/54 (01/30/24 1102)  SpO2: 98 % (01/30/24 1106)    Vital Signs (24h Range):  Temp:  [96.2 °F (35.7 °C)-98.4 °F (36.9 °C)] 97.7 °F (36.5 °C)  Pulse:  [62-89] 77  Resp:  [14-20] 20  SpO2:  [96 %-100 %] 98 %  BP: (105-150)/(54-69) 109/54     Body mass index is 24.79 kg/m².     Physical Exam  Vitals and nursing note reviewed.   Constitutional:       General: She is awake.      Appearance: Normal appearance.   HENT:      Head: Normocephalic and atraumatic.   Pulmonary:      Effort: Pulmonary effort is normal. No respiratory distress.      Comments: 2 L O2 per NC  Abdominal:      General: There is no distension.      Palpations: Abdomen is soft.   Musculoskeletal:      Cervical back: Normal range of motion and neck supple.      Comments: Generalized weakness.    Skin:     General: Skin is warm and dry.      Capillary Refill: Capillary refill takes 2 to 3 seconds.   Neurological:      General: No focal deficit present.      Mental Status: She is alert and oriented to person, place, and time.      GCS: GCS eye subscore is 4. GCS verbal subscore is 5. GCS motor subscore is 6.      Motor: Weakness present.      Gait: Gait abnormal.   Psychiatric:         Mood and Affect: Mood normal.         Behavior: Behavior normal. Behavior is cooperative.          NEUROLOGICAL EXAMINATION:     MENTAL STATUS   Oriented to person, place, and time.       Diagnostic Results: Labs:  Reviewed  Assessment/Plan:     * Malignant carcinoid tumor of bronchus  -S/P thoracotomy and resection with mediastinal lymph node dissection 1/2/2023.   -S/P Chest tube. Last one removed on 01/28.  -S/P PPM placement 01/16.  -Pt on 2 L O2 NC. Some SOB noted . Monitor.         GIB (gastrointestinal bleeding)  -S/P GIB. S/P EGD.   -Continue PPI.         ESRD (end stage renal disease)  -Nephrology following.  -On HD. MWF.    Type 2 diabetes mellitus with diabetic polyneuropathy, without long-term current use of insulin  -SSI while inpatient.     PM&R Recommendation:     At this time, the PM&R team has reviewed this patient's ongoing medical case including inpatient diagnosis, medical history, clinical examination, labs, vitals, current social and functional history to provide the post-acute recommendation as follows:     RECOMMENDATIONS: inpatient rehabilitation due to fair to good potential for improvement with therapies and need of physician oversight for management of ongoing active medical issues, once medically stable.       The patient will be admitted for comprehensive interdisciplinary inpatient rehabilitation to address the impairments due to her  medical diagnosis of S/P Thoracotomy and lobectomy for carcinoid resection on 01/02/2024. The patient will benefit from an inpatient rehabilitation program to promote functional recovery, implement compensatory strategies and will undergo assessment for needs for durable medical equipment for safe discharge to the community. This patient will benefit from a coordinated interdisciplinary rehabilitation program services that require close monitoring and treatment with 24-hour rehabilitative nursing and physical/occupational therapies for 3 hours/day for 5 days/week. This interdisciplinary program will be performed under the direction of a physiatrist.        We will sign off.        Thank you for your consult.     Aleisha Go NP  Department of Physical Medicine &  Rehab  [facility]

## 2024-01-30 NOTE — SUBJECTIVE & OBJECTIVE
Interval History:   -NAEO  -2 additional Bms yesterday which were improved in color  -iHD yesterday  -Labs appear dilutional this morning  -Still with very minimal appetite    Medications:  Continuous Infusions:  Scheduled Meds:   acetylcysteine 100 mg/ml (10%)  4 mL Nebulization TID WAKE    cefpodoxime  200 mg Oral QHS    droNABinol  5 mg Oral BID    epoetin noble (PROCRIT) injection  4,000 Units Intravenous Every Mon, Wed, Fri    erythromycin ethylsuccinate  200 mg Oral QID (WM & HS)    fluconazole  200 mg Oral QHS    heparin (porcine)  5,000 Units Subcutaneous Q8H    insulin aspart U-100  2 Units Subcutaneous TIDWM    insulin detemir U-100  6 Units Subcutaneous Daily    LIDOcaine  1 patch Transdermal Q24H    metoprolol tartrate  12.5 mg Oral BID    metroNIDAZOLE  500 mg Oral Q12H    midodrine  10 mg Oral BID WM    oxybutynin  5 mg Oral TID    pantoprazole  40 mg Oral BID AC    polyethylene glycol  17 g Oral Daily    senna-docusate 8.6-50 mg  1 tablet Oral Daily    sodium chloride 0.9%  10 mL Intravenous Q6H    venlafaxine  37.5 mg Oral Daily     PRN Meds:sodium chloride 0.9%, acetaminophen, albuterol sulfate, bisacodyL, dextrose 10%, dextrose 10%, glucagon (human recombinant), glucose, glucose, heparin (porcine), hydrALAZINE, insulin aspart U-100, midodrine, ondansetron, sodium chloride 0.9%, Flushing PICC/Midline Protocol **AND** sodium chloride 0.9% **AND** sodium chloride 0.9%     Review of patient's allergies indicates:   Allergen Reactions    Crestor [rosuvastatin] Swelling    Gluten protein      Objective:     Vital Signs (Most Recent):  Temp: 98.4 °F (36.9 °C) (01/30/24 0346)  Pulse: 66 (01/30/24 0346)  Resp: 18 (01/30/24 0346)  BP: 138/66 (01/30/24 0346)  SpO2: 96 % (01/30/24 0346) Vital Signs (24h Range):  Temp:  [96.2 °F (35.7 °C)-98.4 °F (36.9 °C)] 98.4 °F (36.9 °C)  Pulse:  [62-89] 66  Resp:  [14-18] 18  SpO2:  [96 %-100 %] 96 %  BP: (105-150)/(56-82) 138/66     Weight: 67.6 kg (149 lb)  Body mass  index is 24.79 kg/m².    Intake/Output - Last 3 Shifts         01/28 0700 01/29 0659 01/29 0700 01/30 0659 01/30 0700 01/31 0659    P.O. 300 200     Other  300     Total Intake(mL/kg) 300 (4.4) 500 (7.4)     Other  2046     Total Output  2046     Net +300 -1546            Urine Occurrence 1 x 1 x     Stool Occurrence 2 x 2 x     Emesis Occurrence  0 x              Physical Exam  Vitals and nursing note reviewed.   Constitutional:       Appearance: Normal appearance.   HENT:      Head: Normocephalic and atraumatic.      Nose: Nose normal.   Cardiovascular:      Rate and Rhythm: Normal rate and regular rhythm.      Pulses: Normal pulses.   Pulmonary:      Effort: Pulmonary effort is normal. No respiratory distress.      Comments: Right posterior chest incision c/d/I  Right chest permacath    Abdominal:      General: Abdomen is flat. There is no distension.      Palpations: Abdomen is soft.      Tenderness: There is no abdominal tenderness.      Comments: Soft, nontender abdomen   Musculoskeletal:      Right lower leg: No edema.      Left lower leg: No edema.   Skin:     General: Skin is warm and dry.      Comments: Groin soft, dressings c/d/i   Neurological:      General: No focal deficit present.      Mental Status: She is alert.          Significant Labs:  I have reviewed all pertinent lab results within the past 24 hours.  CBC:   Recent Labs   Lab 01/30/24  0456   WBC 5.62   RBC 2.70*   HGB 8.0*   HCT 25.6*   PLT 85*   MCV 95   MCH 29.6   MCHC 31.3*     CMP:   Recent Labs   Lab 01/30/24  0456   GLU 70   CALCIUM 6.9*   ALBUMIN 1.6*   PROT 3.9*      K 2.9*   CO2 19*      BUN 28*   CREATININE 2.7*   ALKPHOS 75   ALT 5*   AST 14   BILITOT 0.4       Significant Diagnostics:  I have reviewed all pertinent imaging results/findings within the past 24 hours.

## 2024-01-30 NOTE — SUBJECTIVE & OBJECTIVE
Interval History:   HD completed on yesterday with 1500 mL removed. No distress on exam. Electrolytes non emergent. K 2.9 sp oral replacements.     Review of patient's allergies indicates:   Allergen Reactions    Crestor [rosuvastatin] Swelling    Gluten protein      Current Facility-Administered Medications   Medication Frequency    0.9%  NaCl infusion PRN    acetaminophen tablet 650 mg Q4H PRN    acetylcysteine 100 mg/ml (10%) solution 4 mL TID WAKE    albuterol sulfate nebulizer solution 2.5 mg Q4H PRN    bisacodyL suppository 10 mg Daily PRN    cefpodoxime tablet 200 mg QHS    dextrose 10% bolus 125 mL 125 mL PRN    dextrose 10% bolus 250 mL 250 mL PRN    droNABinol capsule 5 mg BID    epoetin noble injection 4,000 Units Every Mon, Wed, Fri    erythromycin ethylsuccinate 200 mg/5 mL suspension 200 mg QID (WM & HS)    fluconazole tablet 200 mg QHS    glucagon (human recombinant) injection 1 mg PRN    glucose chewable tablet 16 g PRN    glucose chewable tablet 24 g PRN    heparin (porcine) injection 1,000 Units PRN    heparin (porcine) injection 5,000 Units Q8H    hydrALAZINE injection 10 mg Q6H PRN    insulin aspart U-100 pen 0-10 Units QID (AC + HS) PRN    insulin aspart U-100 pen 2 Units TIDWM    insulin detemir U-100 (Levemir) pen 4 Units Daily    LIDOcaine 5 % patch 1 patch Q24H    metoprolol tartrate (LOPRESSOR) split tablet 12.5 mg BID    metroNIDAZOLE tablet 500 mg Q12H    midodrine tablet 10 mg BID WM    midodrine tablet 15 mg PRN    ondansetron injection 8 mg Q8H PRN    oxybutynin tablet 5 mg TID    pantoprazole EC tablet 40 mg BID AC    polyethylene glycol packet 17 g Daily    senna-docusate 8.6-50 mg per tablet 1 tablet Daily    sodium chloride 0.9% flush 10 mL PRN    sodium chloride 0.9% flush 10 mL Q6H    And    sodium chloride 0.9% flush 10 mL PRN    venlafaxine tablet 37.5 mg Daily       Objective:     Vital Signs (Most Recent):  Temp: 97.9 °F (36.6 °C) (01/30/24 0721)  Pulse: 72 (01/30/24  0804)  Resp: 18 (01/30/24 0804)  BP: 130/62 (01/30/24 0721)  SpO2: 97 % (01/30/24 0804) Vital Signs (24h Range):  Temp:  [96.2 °F (35.7 °C)-98.4 °F (36.9 °C)] 97.9 °F (36.6 °C)  Pulse:  [62-89] 72  Resp:  [14-18] 18  SpO2:  [96 %-100 %] 97 %  BP: (105-150)/(56-70) 130/62     Weight: 67.6 kg (149 lb) (01/25/24 0600)  Body mass index is 24.79 kg/m².  Body surface area is 1.76 meters squared.    I/O last 3 completed shifts:  In: 800 [P.O.:500; Other:300]  Out: 2046 [Other:2046]     Physical Exam  Vitals and nursing note reviewed.   Constitutional:       Appearance: Normal appearance.   HENT:      Head: Normocephalic and atraumatic.      Nose: Nose normal.   Cardiovascular:      Rate and Rhythm: Normal rate and regular rhythm.      Pulses: Normal pulses.   Pulmonary:      Effort: Pulmonary effort is normal. No respiratory distress.      Comments: Right posterior chest incision c/d/I  Right chest permacath    Abdominal:      General: Abdomen is flat. There is no distension.      Palpations: Abdomen is soft.      Tenderness: There is no abdominal tenderness.      Comments: Soft, nontender abdomen   Musculoskeletal:      Right lower leg: Edema present.      Left lower leg: Edema present.   Skin:     General: Skin is warm and dry.      Coloration: Skin is pale.      Comments: Groin soft, dressings c/d/i   Neurological:      General: No focal deficit present.      Mental Status: She is alert.          Significant Labs:  CBC:   Recent Labs   Lab 01/30/24  0456   WBC 5.62   RBC 2.70*   HGB 8.0*   HCT 25.6*   PLT 85*   MCV 95   MCH 29.6   MCHC 31.3*     CMP:   Recent Labs   Lab 01/30/24  0456   GLU 70   CALCIUM 6.9*   ALBUMIN 1.6*   PROT 3.9*      K 2.9*   CO2 19*      BUN 28*   CREATININE 2.7*   ALKPHOS 75   ALT 5*   AST 14   BILITOT 0.4     All labs within the past 24 hours have been reviewed.

## 2024-01-31 LAB
ALBUMIN SERPL BCP-MCNC: 2 G/DL (ref 3.5–5.2)
ALP SERPL-CCNC: 102 U/L (ref 55–135)
ALT SERPL W/O P-5'-P-CCNC: <5 U/L (ref 10–44)
ANION GAP SERPL CALC-SCNC: 9 MMOL/L (ref 8–16)
AST SERPL-CCNC: 12 U/L (ref 10–40)
BASOPHILS # BLD AUTO: 0.02 K/UL (ref 0–0.2)
BASOPHILS NFR BLD: 0.3 % (ref 0–1.9)
BILIRUB SERPL-MCNC: 0.4 MG/DL (ref 0.1–1)
BUN SERPL-MCNC: 39 MG/DL (ref 8–23)
CALCIUM SERPL-MCNC: 8.2 MG/DL (ref 8.7–10.5)
CHLORIDE SERPL-SCNC: 102 MMOL/L (ref 95–110)
CO2 SERPL-SCNC: 22 MMOL/L (ref 23–29)
CREAT SERPL-MCNC: 4 MG/DL (ref 0.5–1.4)
DIFFERENTIAL METHOD BLD: ABNORMAL
EOSINOPHIL # BLD AUTO: 0.1 K/UL (ref 0–0.5)
EOSINOPHIL NFR BLD: 1 % (ref 0–8)
ERYTHROCYTE [DISTWIDTH] IN BLOOD BY AUTOMATED COUNT: 25.6 % (ref 11.5–14.5)
EST. GFR  (NO RACE VARIABLE): 11.3 ML/MIN/1.73 M^2
GLUCOSE SERPL-MCNC: 133 MG/DL (ref 70–110)
HCT VFR BLD AUTO: 29.3 % (ref 37–48.5)
HGB BLD-MCNC: 9.1 G/DL (ref 12–16)
IMM GRANULOCYTES # BLD AUTO: 0.04 K/UL (ref 0–0.04)
IMM GRANULOCYTES NFR BLD AUTO: 0.5 % (ref 0–0.5)
LYMPHOCYTES # BLD AUTO: 0.5 K/UL (ref 1–4.8)
LYMPHOCYTES NFR BLD: 6.8 % (ref 18–48)
MAGNESIUM SERPL-MCNC: 1.9 MG/DL (ref 1.6–2.6)
MCH RBC QN AUTO: 29.1 PG (ref 27–31)
MCHC RBC AUTO-ENTMCNC: 31.1 G/DL (ref 32–36)
MCV RBC AUTO: 94 FL (ref 82–98)
MONOCYTES # BLD AUTO: 0.6 K/UL (ref 0.3–1)
MONOCYTES NFR BLD: 8.3 % (ref 4–15)
NEUTROPHILS # BLD AUTO: 6.1 K/UL (ref 1.8–7.7)
NEUTROPHILS NFR BLD: 83.1 % (ref 38–73)
NRBC BLD-RTO: 0 /100 WBC
PHOSPHATE SERPL-MCNC: 3 MG/DL (ref 2.7–4.5)
PLATELET # BLD AUTO: 135 K/UL (ref 150–450)
PMV BLD AUTO: 10.5 FL (ref 9.2–12.9)
POCT GLUCOSE: 122 MG/DL (ref 70–110)
POCT GLUCOSE: 124 MG/DL (ref 70–110)
POCT GLUCOSE: 157 MG/DL (ref 70–110)
POCT GLUCOSE: 80 MG/DL (ref 70–110)
POTASSIUM SERPL-SCNC: 3.7 MMOL/L (ref 3.5–5.1)
PROT SERPL-MCNC: 5.1 G/DL (ref 6–8.4)
RBC # BLD AUTO: 3.13 M/UL (ref 4–5.4)
SODIUM SERPL-SCNC: 133 MMOL/L (ref 136–145)
WBC # BLD AUTO: 7.32 K/UL (ref 3.9–12.7)

## 2024-01-31 PROCEDURE — 63600175 PHARM REV CODE 636 W HCPCS: Performed by: NURSE PRACTITIONER

## 2024-01-31 PROCEDURE — 63600175 PHARM REV CODE 636 W HCPCS: Performed by: STUDENT IN AN ORGANIZED HEALTH CARE EDUCATION/TRAINING PROGRAM

## 2024-01-31 PROCEDURE — 83735 ASSAY OF MAGNESIUM: CPT

## 2024-01-31 PROCEDURE — 25000003 PHARM REV CODE 250

## 2024-01-31 PROCEDURE — 94640 AIRWAY INHALATION TREATMENT: CPT

## 2024-01-31 PROCEDURE — 84100 ASSAY OF PHOSPHORUS: CPT

## 2024-01-31 PROCEDURE — 36415 COLL VENOUS BLD VENIPUNCTURE: CPT | Performed by: STUDENT IN AN ORGANIZED HEALTH CARE EDUCATION/TRAINING PROGRAM

## 2024-01-31 PROCEDURE — A4216 STERILE WATER/SALINE, 10 ML: HCPCS | Performed by: STUDENT IN AN ORGANIZED HEALTH CARE EDUCATION/TRAINING PROGRAM

## 2024-01-31 PROCEDURE — 94664 DEMO&/EVAL PT USE INHALER: CPT

## 2024-01-31 PROCEDURE — 94668 MNPJ CHEST WALL SBSQ: CPT

## 2024-01-31 PROCEDURE — 25000003 PHARM REV CODE 250: Performed by: STUDENT IN AN ORGANIZED HEALTH CARE EDUCATION/TRAINING PROGRAM

## 2024-01-31 PROCEDURE — 20600001 HC STEP DOWN PRIVATE ROOM

## 2024-01-31 PROCEDURE — 90935 HEMODIALYSIS ONE EVALUATION: CPT | Mod: ,,, | Performed by: NURSE PRACTITIONER

## 2024-01-31 PROCEDURE — 63600175 PHARM REV CODE 636 W HCPCS

## 2024-01-31 PROCEDURE — 94761 N-INVAS EAR/PLS OXIMETRY MLT: CPT

## 2024-01-31 PROCEDURE — 85025 COMPLETE CBC W/AUTO DIFF WBC: CPT | Performed by: STUDENT IN AN ORGANIZED HEALTH CARE EDUCATION/TRAINING PROGRAM

## 2024-01-31 PROCEDURE — 90935 HEMODIALYSIS ONE EVALUATION: CPT

## 2024-01-31 PROCEDURE — 25000003 PHARM REV CODE 250: Performed by: SURGERY

## 2024-01-31 PROCEDURE — 80053 COMPREHEN METABOLIC PANEL: CPT | Performed by: STUDENT IN AN ORGANIZED HEALTH CARE EDUCATION/TRAINING PROGRAM

## 2024-01-31 PROCEDURE — 27000221 HC OXYGEN, UP TO 24 HOURS

## 2024-01-31 PROCEDURE — 25000242 PHARM REV CODE 250 ALT 637 W/ HCPCS

## 2024-01-31 PROCEDURE — 99900035 HC TECH TIME PER 15 MIN (STAT)

## 2024-01-31 PROCEDURE — 25000003 PHARM REV CODE 250: Performed by: NURSE PRACTITIONER

## 2024-01-31 PROCEDURE — 25000242 PHARM REV CODE 250 ALT 637 W/ HCPCS: Performed by: STUDENT IN AN ORGANIZED HEALTH CARE EDUCATION/TRAINING PROGRAM

## 2024-01-31 RX ADMIN — ERYTHROMYCIN ETHYLSUCCINATE 200 MG: 200 SUSPENSION ORAL at 08:01

## 2024-01-31 RX ADMIN — ALBUTEROL SULFATE 2.5 MG: 2.5 SOLUTION RESPIRATORY (INHALATION) at 07:01

## 2024-01-31 RX ADMIN — DRONABINOL 5 MG: 2.5 CAPSULE ORAL at 02:01

## 2024-01-31 RX ADMIN — ERYTHROMYCIN ETHYLSUCCINATE 200 MG: 200 SUSPENSION ORAL at 02:01

## 2024-01-31 RX ADMIN — Medication 10 ML: at 05:01

## 2024-01-31 RX ADMIN — Medication 10 ML: at 02:01

## 2024-01-31 RX ADMIN — ALBUTEROL SULFATE 2.5 MG: 2.5 SOLUTION RESPIRATORY (INHALATION) at 02:01

## 2024-01-31 RX ADMIN — PANTOPRAZOLE SODIUM 40 MG: 40 TABLET, DELAYED RELEASE ORAL at 04:01

## 2024-01-31 RX ADMIN — OXYBUTYNIN CHLORIDE 5 MG: 5 TABLET ORAL at 02:01

## 2024-01-31 RX ADMIN — ERYTHROPOIETIN 4000 UNITS: 4000 INJECTION, SOLUTION INTRAVENOUS; SUBCUTANEOUS at 12:01

## 2024-01-31 RX ADMIN — OXYBUTYNIN CHLORIDE 5 MG: 5 TABLET ORAL at 08:01

## 2024-01-31 RX ADMIN — METRONIDAZOLE 500 MG: 500 TABLET ORAL at 08:01

## 2024-01-31 RX ADMIN — HEPARIN SODIUM 1000 UNITS: 1000 INJECTION, SOLUTION INTRAVENOUS; SUBCUTANEOUS at 12:01

## 2024-01-31 RX ADMIN — PANTOPRAZOLE SODIUM 40 MG: 40 TABLET, DELAYED RELEASE ORAL at 05:01

## 2024-01-31 RX ADMIN — METOPROLOL TARTRATE 12.5 MG: 25 TABLET, FILM COATED ORAL at 08:01

## 2024-01-31 RX ADMIN — HEPARIN SODIUM 5000 UNITS: 5000 INJECTION INTRAVENOUS; SUBCUTANEOUS at 09:01

## 2024-01-31 RX ADMIN — LIDOCAINE 5% 1 PATCH: 700 PATCH TOPICAL at 02:01

## 2024-01-31 RX ADMIN — METRONIDAZOLE 500 MG: 500 TABLET ORAL at 02:01

## 2024-01-31 RX ADMIN — HEPARIN SODIUM 5000 UNITS: 5000 INJECTION INTRAVENOUS; SUBCUTANEOUS at 05:01

## 2024-01-31 RX ADMIN — ACETYLCYSTEINE 4 ML: 100 INHALANT RESPIRATORY (INHALATION) at 07:01

## 2024-01-31 RX ADMIN — SODIUM CHLORIDE: 9 INJECTION, SOLUTION INTRAVENOUS at 09:01

## 2024-01-31 RX ADMIN — Medication 10 ML: at 06:01

## 2024-01-31 RX ADMIN — CEFPODOXIME PROXETIL 200 MG: 200 TABLET, FILM COATED ORAL at 08:01

## 2024-01-31 RX ADMIN — MIDODRINE HYDROCHLORIDE 10 MG: 5 TABLET ORAL at 08:01

## 2024-01-31 RX ADMIN — VENLAFAXINE 37.5 MG: 37.5 TABLET ORAL at 08:01

## 2024-01-31 RX ADMIN — ACETYLCYSTEINE 4 ML: 100 INHALANT RESPIRATORY (INHALATION) at 08:01

## 2024-01-31 RX ADMIN — ALBUTEROL SULFATE 2.5 MG: 2.5 SOLUTION RESPIRATORY (INHALATION) at 08:01

## 2024-01-31 RX ADMIN — MIDODRINE HYDROCHLORIDE 10 MG: 5 TABLET ORAL at 05:01

## 2024-01-31 RX ADMIN — DRONABINOL 5 MG: 2.5 CAPSULE ORAL at 08:01

## 2024-01-31 RX ADMIN — FLUCONAZOLE 200 MG: 200 TABLET ORAL at 08:01

## 2024-01-31 RX ADMIN — ACETYLCYSTEINE 4 ML: 100 INHALANT RESPIRATORY (INHALATION) at 02:01

## 2024-01-31 RX ADMIN — HEPARIN SODIUM 5000 UNITS: 5000 INJECTION INTRAVENOUS; SUBCUTANEOUS at 02:01

## 2024-01-31 RX ADMIN — SENNOSIDES AND DOCUSATE SODIUM 1 TABLET: 8.6; 5 TABLET ORAL at 02:01

## 2024-01-31 NOTE — PROGRESS NOTES
Hemodialysis treatment completed.    Treatment time received: 3.5 hours    Net fluid removed: 2 liters    Medications received: epoetin per MAR    No issues during HD treatment.     Blood returned, heparin locked ports per order, capped, and taped.    Report given to Mayra Gomez RN  Refer to dialysis flowsheet and MAR for details.  Patient transported back in stretcher from Dialysis to primary unit.

## 2024-01-31 NOTE — CARE UPDATE
-Glucose Goal 140-180    -A1C:   Hemoglobin A1C   Date Value Ref Range Status   11/15/2023 4.9 4.0 - 5.6 % Final     Comment:     ADA Screening Guidelines:  5.7-6.4%  Consistent with prediabetes  >or=6.5%  Consistent with diabetes    High levels of fetal hemoglobin interfere with the HbA1C  assay. Heterozygous hemoglobin variants (HbS, HgC, etc)do  not significantly interfere with this assay.   However, presence of multiple variants may affect accuracy.     10/23/2019 6.8 % Final         -HOME REGIMEN:   LDC SSI   Blood sugar 150 to 200, + 1 unit  Blood sugar 201 to 250, + 2 units  Blood sugar 251 to 300, + 3 units  Blood sugar 301 to 350, + 4 units   Blood sugar greater than 350, + 5 units      -GLUCOSE TREND FOR THE PAST 24HRS:   Recent Labs   Lab 01/29/24  2127 01/30/24  0726 01/30/24  1204 01/30/24  1609 01/30/24  2210 01/31/24  0755   POCTGLUCOSE 130* 88 160* 192* 194* 124*         -NO HYPOGYCEMIAS NOTED     - Diet  Diet diabetic 2000 Calorie    -TOLERATING 25 % OF PO DIET      Plan:   - Continue Levemir 4 units daily - will monitor need for prandial coverage given diet has been advanced  - Novolog Mod dose correction with ISF 25 starting at 150 prn   - POCT Glucose before meals and at bedtime   - Hypoglycemia protocol in place      ** Please notify Endocrine for any change and/or advance in diet**  ** Please call Endocrine for any BG related issues **     Discharge Planning:   TBD. Please notify endocrinology prior to discharge.

## 2024-01-31 NOTE — NURSING
Pt back in room from dialysis. Pt denies NAD and none noted. Pt sitting up in chair. Family at bedside. Call light within reach. Pt instructed to call for assistance. Will cont poc

## 2024-01-31 NOTE — PT/OT/SLP PROGRESS
Physical Therapy      Patient Name:  Lily Green   MRN:  [id number]    Patient not seen today secondary to Dialysis. Will follow-up next scheduled treatment per PT POC.

## 2024-01-31 NOTE — PLAN OF CARE
Problem: Adult Inpatient Plan of Care  Goal: Plan of Care Review  Outcome: Ongoing, Progressing     Problem: Diabetes Comorbidity  Goal: Blood Glucose Level Within Targeted Range  Outcome: Ongoing, Progressing     Problem: Infection  Goal: Absence of Infection Signs and Symptoms  Outcome: Ongoing, Progressing     Problem: Device-Related Complication Risk (Hemodialysis)  Goal: Safe, Effective Therapy Delivery  Outcome: Ongoing, Progressing     Problem: Fall Injury Risk  Goal: Absence of Fall and Fall-Related Injury  Outcome: Ongoing, Progressing     Problem: Skin Injury Risk Increased  Goal: Skin Health and Integrity  Outcome: Ongoing, Progressing     Problem: Electrolyte Imbalance (Chronic Kidney Disease)  Goal: Electrolyte Balance  Outcome: Ongoing, Progressing

## 2024-01-31 NOTE — SUBJECTIVE & OBJECTIVE
Interval History:   -NAEO  -Bowel movements continuing to lighten in color  -Hgb stable  -iHD today    Medications:  Continuous Infusions:  Scheduled Meds:   sodium chloride 0.9%   Intravenous Once    acetylcysteine 100 mg/ml (10%)  4 mL Nebulization TID WAKE    cefpodoxime  200 mg Oral QHS    droNABinol  5 mg Oral BID    epoetin noble (PROCRIT) injection  4,000 Units Intravenous Every Mon, Wed, Fri    erythromycin ethylsuccinate  200 mg Oral QID (WM & HS)    fluconazole  200 mg Oral QHS    heparin (porcine)  5,000 Units Subcutaneous Q8H    insulin detemir U-100  4 Units Subcutaneous Daily    LIDOcaine  1 patch Transdermal Q24H    metoprolol tartrate  12.5 mg Oral BID    metroNIDAZOLE  500 mg Oral Q12H    midodrine  10 mg Oral BID WM    oxybutynin  5 mg Oral TID    pantoprazole  40 mg Oral BID AC    senna-docusate 8.6-50 mg  1 tablet Oral Daily    sodium chloride 0.9%  10 mL Intravenous Q6H    venlafaxine  37.5 mg Oral Daily     PRN Meds:sodium chloride 0.9%, acetaminophen, albuterol sulfate, bisacodyL, dextrose 10%, dextrose 10%, glucagon (human recombinant), glucose, glucose, heparin (porcine), hydrALAZINE, insulin aspart U-100, midodrine, ondansetron, sodium chloride 0.9%, Flushing PICC/Midline Protocol **AND** sodium chloride 0.9% **AND** sodium chloride 0.9%     Review of patient's allergies indicates:   Allergen Reactions    Crestor [rosuvastatin] Swelling    Gluten protein      Objective:     Vital Signs (Most Recent):  Temp: 97.6 °F (36.4 °C) (01/31/24 0856)  Pulse: 70 (01/31/24 1100)  Resp: 18 (01/31/24 0910)  BP: 123/66 (01/31/24 1100)  SpO2: 99 % (01/31/24 0856) Vital Signs (24h Range):  Temp:  [97.5 °F (36.4 °C)-98.6 °F (37 °C)] 97.6 °F (36.4 °C)  Pulse:  [70-84] 70  Resp:  [16-19] 18  SpO2:  [93 %-100 %] 99 %  BP: (114-160)/(55-99) 123/66     Weight: 67.6 kg (149 lb)  Body mass index is 24.79 kg/m².    Intake/Output - Last 3 Shifts         01/29 0700 01/30 0659 01/30 0700 01/31 0659 01/31 0700 02/01  0659    P.O. 200 1090     Other 300      Total Intake(mL/kg) 500 (7.4) 1090 (16.1)     Urine (mL/kg/hr)  300 (0.2)     Other 2046      Stool  0     Total Output 2046 300     Net -1546 +790            Urine Occurrence 1 x      Stool Occurrence 2 x 4 x     Emesis Occurrence 0 x               Physical Exam  Vitals and nursing note reviewed.   Constitutional:       Appearance: Normal appearance.   HENT:      Head: Normocephalic and atraumatic.      Nose: Nose normal.   Cardiovascular:      Rate and Rhythm: Normal rate and regular rhythm.      Pulses: Normal pulses.   Pulmonary:      Effort: Pulmonary effort is normal. No respiratory distress.      Comments: Right posterior chest incision c/d/I  Right chest permacath    Abdominal:      General: Abdomen is flat. There is no distension.      Palpations: Abdomen is soft.      Tenderness: There is no abdominal tenderness.      Comments: Soft, nontender abdomen   Musculoskeletal:      Right lower leg: No edema.      Left lower leg: No edema.   Skin:     General: Skin is warm and dry.      Comments: Groin soft, dressings c/d/i   Neurological:      General: No focal deficit present.      Mental Status: She is alert.          Significant Labs:  I have reviewed all pertinent lab results within the past 24 hours.  CBC:   Recent Labs   Lab 01/31/24  0420   WBC 7.32   RBC 3.13*   HGB 9.1*   HCT 29.3*   *   MCV 94   MCH 29.1   MCHC 31.1*     CMP:   Recent Labs   Lab 01/31/24  0420   *   CALCIUM 8.2*   ALBUMIN 2.0*   PROT 5.1*   *   K 3.7   CO2 22*      BUN 39*   CREATININE 4.0*   ALKPHOS 102   ALT <5*   AST 12   BILITOT 0.4       Significant Diagnostics:  I have reviewed all pertinent imaging results/findings within the past 24 hours.

## 2024-01-31 NOTE — CONSULTS
LORRIE consulted for PVA; pt currently has midline to NELY, not able to flush; assessed midline, midline statlock not applied, sterile dressing change completed, +flush/+blood return, reported to nurse

## 2024-01-31 NOTE — PROGRESS NOTES
[facility]  General Surgery  Progress Note    Subjective:     History of Present Illness:  No notes on file    Post-Op Info:  Procedure(s) (LRB):  EGD (ESOPHAGOGASTRODUODENOSCOPY) (N/A)   5 Days Post-Op     Interval History:   -NAEO  -Bowel movements continuing to lighten in color  -Hgb stable  -iHD today    Medications:  Continuous Infusions:  Scheduled Meds:   sodium chloride 0.9%   Intravenous Once    acetylcysteine 100 mg/ml (10%)  4 mL Nebulization TID WAKE    cefpodoxime  200 mg Oral QHS    droNABinol  5 mg Oral BID    epoetin noble (PROCRIT) injection  4,000 Units Intravenous Every Mon, Wed, Fri    erythromycin ethylsuccinate  200 mg Oral QID (WM & HS)    fluconazole  200 mg Oral QHS    heparin (porcine)  5,000 Units Subcutaneous Q8H    insulin detemir U-100  4 Units Subcutaneous Daily    LIDOcaine  1 patch Transdermal Q24H    metoprolol tartrate  12.5 mg Oral BID    metroNIDAZOLE  500 mg Oral Q12H    midodrine  10 mg Oral BID WM    oxybutynin  5 mg Oral TID    pantoprazole  40 mg Oral BID AC    senna-docusate 8.6-50 mg  1 tablet Oral Daily    sodium chloride 0.9%  10 mL Intravenous Q6H    venlafaxine  37.5 mg Oral Daily     PRN Meds:sodium chloride 0.9%, acetaminophen, albuterol sulfate, bisacodyL, dextrose 10%, dextrose 10%, glucagon (human recombinant), glucose, glucose, heparin (porcine), hydrALAZINE, insulin aspart U-100, midodrine, ondansetron, sodium chloride 0.9%, Flushing PICC/Midline Protocol **AND** sodium chloride 0.9% **AND** sodium chloride 0.9%     Review of patient's allergies indicates:   Allergen Reactions    Crestor [rosuvastatin] Swelling    Gluten protein      Objective:     Vital Signs (Most Recent):  Temp: 97.6 °F (36.4 °C) (01/31/24 0856)  Pulse: 70 (01/31/24 1100)  Resp: 18 (01/31/24 0910)  BP: 123/66 (01/31/24 1100)  SpO2: 99 % (01/31/24 0856) Vital Signs (24h Range):  Temp:  [97.5 °F (36.4 °C)-98.6 °F (37 °C)] 97.6 °F (36.4 °C)  Pulse:  [70-84] 70  Resp:  [16-19] 18  SpO2:  [93  %-100 %] 99 %  BP: (114-160)/(55-99) 123/66     Weight: 67.6 kg (149 lb)  Body mass index is 24.79 kg/m².    Intake/Output - Last 3 Shifts         01/29 0700 01/30 0659 01/30 0700 01/31 0659 01/31 0700 02/01 0659    P.O. 200 1090     Other 300      Total Intake(mL/kg) 500 (7.4) 1090 (16.1)     Urine (mL/kg/hr)  300 (0.2)     Other 2046      Stool  0     Total Output 2046 300     Net -1546 +790            Urine Occurrence 1 x      Stool Occurrence 2 x 4 x     Emesis Occurrence 0 x               Physical Exam  Vitals and nursing note reviewed.   Constitutional:       Appearance: Normal appearance.   HENT:      Head: Normocephalic and atraumatic.      Nose: Nose normal.   Cardiovascular:      Rate and Rhythm: Normal rate and regular rhythm.      Pulses: Normal pulses.   Pulmonary:      Effort: Pulmonary effort is normal. No respiratory distress.      Comments: Right posterior chest incision c/d/I  Right chest permacath    Abdominal:      General: Abdomen is flat. There is no distension.      Palpations: Abdomen is soft.      Tenderness: There is no abdominal tenderness.      Comments: Soft, nontender abdomen   Musculoskeletal:      Right lower leg: No edema.      Left lower leg: No edema.   Skin:     General: Skin is warm and dry.      Comments: Groin soft, dressings c/d/i   Neurological:      General: No focal deficit present.      Mental Status: She is alert.          Significant Labs:  I have reviewed all pertinent lab results within the past 24 hours.  CBC:   Recent Labs   Lab 01/31/24  0420   WBC 7.32   RBC 3.13*   HGB 9.1*   HCT 29.3*   *   MCV 94   MCH 29.1   MCHC 31.1*     CMP:   Recent Labs   Lab 01/31/24  0420   *   CALCIUM 8.2*   ALBUMIN 2.0*   PROT 5.1*   *   K 3.7   CO2 22*      BUN 39*   CREATININE 4.0*   ALKPHOS 102   ALT <5*   AST 12   BILITOT 0.4       Significant Diagnostics:  I have reviewed all pertinent imaging results/findings within the past 24  hours.  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 Did have brief SBO type picture 1/7 which resolved with conservative management.     - s/p R thoracotomy with R lower lobectomy for carcinoid w/ MLND; IPV injury requiring repair. CT removed post operatively.  - Refractory afib RVR requiring ICU step up 1/05, s/p chemical cardioversion initially with recurrence c/b developing tachy-eulogio syndrome    - cards and EP following, appreciate assistance            - Plan for holter monitor 30 days after discharge followed by EP outpatient follow up    - s/p TVP placement and now micra implantation 1/16    - Heparin gtt resumed 1/22; will need to transition to eliquis likely following CT removal; held 1/23 2/2 GI bleed     - Heparin ppx 1/29  - s/p R chest tube placement with IR 1/17, L chest tube placement 1/18; L CT removed 1/25; Last R CT out 1/28  - Pulmonary toilet: IS, Acapella, Mucomyst daily; CPT ordered  - Infectious work up 2/2 leukocytosis. She remains afebrile. Effusions and compressive atelectasis also likely contributing to leukocytosis.Trended up to 30k 1/16, now downtrending. Zosyn 1/11-1/15; Meropenam 1/16 - 1/20; Plan for Cefpodoxime and metronidazole to start 1/21 per ID. 4 week abx plan for empiric treatment of presumed parapneumonic effusion - end date 2/15/2024.    - ID following, appreciate assistance. Reconsult following thora results.   - Bcx 1/11: Staph, probable contaminant  - Bcx1/12: No growth  - Rapid blood PCR: staph epi  - Sputum Cx 1/11:  klebsiella & e. Coli  - R pleural fluid Cx 1/17: No growth (fungal pending still, neg KOH)  - SBO type episode 1/07 requiring NGT decompression, which resolved with conservative management   - GI bleed 1/23/2024 - heparin held, 2uPRBC, 1uFFP, heparin reversed. GI consulted                       - EGD 1/24 - could not visualize stomach much 2/2 food residue; EGD aborted                       - 2u PRBC overnight 1/25 for Hgb 6.3, slow downtrend. Repeat EGD 1/26 demonstrates non-bleeding ulcers    - Continue protonix. GI signed off. Repeat scope in 8 weeks. F/u H pylori   - Diabetic diet diet, novasource renal BID; marinol for appetite.   - Bowel regimen, PPI  - Nephrology following, appreciate assistance. CRRT 1/17 overnight. HD trial 1/19 passed but requiring midodrine, 1.5 L LR, metoprolol for RVR. HD 1/26           - LUE AVF created 10.2023, US 1/22/2023 with good flow metrics, okay to use per vascular   - Oxybutinin for bladder spasms   - MM pain control as needed  - Continue PT/OT, mobilization, likely post hospital placement; engaged social work        Mike Lam MD  General Surgery  [facility]

## 2024-01-31 NOTE — PROGRESS NOTES
Patient arrived in a stretcher to dialysis unit.   Report received from Mayra Gomez RN  VS's per dialysis Flowsheet.    ESRD on outpatient TTS schedule  Hemodialysis initiated using the following:    Dialysis Access: right TDC    Aspirated, flushed, and accessed using aseptic technique.    Will Maintain telemetry and blood pressure monitoring throughout treatment.  Refer to dialysis flowsheet and MAR for details.

## 2024-01-31 NOTE — PROGRESS NOTES
Jeovanny Dow - [facility]  Adult Nutrition  Progress Note    SUMMARY       Recommendations    1. Recommend to change diet back to Renal, gluten free diet with texture per SLP                   - texture advance per SLP/MD                 - this is the diet pt follows at home.     2. Rec'd continuing ONS Novasource renal BID       3. Recommend considering psyllium husk BID to help bulk up stools.      4.) Recommend adding daily probiotic to help with GI issues.      5.) RD to monitor and follow     Goals: Meet % EEN, EPN by RD f/u  Nutrition Goal Status: progressing towards goal  Communication of RD Recs:  (POC)    Assessment and Plan    Nutrition Problem  Inadequate oral intake     Related to (etiology):   Decreased ability to consume sufficient energy     Signs and Symptoms (as evidenced by):   Restrictive diet       Interventions/Recommendations (treatment strategy):  Collaboration of nutrition care with other providers     Nutrition Diagnosis Status:   Continue    Reason for Assessment    Reason For Assessment: RD follow-up  Diagnosis:  (malignant carcinoid tumor of bronchus)  Relevant Medical History: T2DM, ESRD  Interdisciplinary Rounds: did not attend  General Information Comments: RD f/u, pt with 0-25% intake of meals. 2L of fluid removed during HD today. No noted N/V/D/C but has light colored stools. Following  Nutrition Discharge Planning: Renal diet    Nutrition Risk Screen    Nutrition Risk Screen: no indicators present    Nutrition/Diet History    Patient Reported Diet/Restrictions/Preferences: gluten-free, diabetic diet, renal (Low potassium)  Food Preferences: No potatoes, grits, high potassium foods. Drink smoothies, eats strawberries, low sodium chicken broth, cabbage, carrots OK; Broiled fish/chicken  Spiritual, Cultural Beliefs, Yazdanism Practices, Values that Affect Care: no  Food Allergies: wheat (gluten)  Factors Affecting Nutritional Intake: decreased appetite    Anthropometrics    Temp: 97.7 °F  "(36.5 °C)  Height Method: Stated  Height: 5' 5" (165.1 cm)  Height (inches): 65 in  Weight Method: Bed Scale  Weight: 67.6 kg (149 lb)  Weight (lb): 149 lb  Ideal Body Weight (IBW), Female: 125 lb  % Ideal Body Weight, Female (lb): 121.6 %  BMI (Calculated): 24.8       Lab/Procedures/Meds    Pertinent Labs Reviewed: reviewed  Pertinent Labs Comments: H/h: 9.1/29.3, mchc: 31.1, Na: 133, Bun: 39, creatinine: 4, GFR: 11.3, Glu: 133, ALT <5  Pertinent Medications Reviewed: reviewed  Pertinent Medications Comments: dronabinol, insulin, abx, pantoprazole, polyethylene glycol, senna-docusate    Physical Findings/Assessment         Estimated/Assessed Needs    Weight Used For Calorie Calculations: 57 kg (125 lb 10.6 oz) (d/t edema present)  Energy Calorie Requirements (kcal): 1710- 1995 kcal  Energy Need Method: Kcal/kg (30-35 kcal/kg of IBW)  Protein Requirements: 86- 114g (1.5-2.0g/kg of IBW)  Weight Used For Protein Calculations: 57 kg (125 lb 10.6 oz) (IBW d/t edema present)        RDA Method (mL): 1710  CHO Requirement: 214- 249 g      Nutrition Prescription Ordered    Current Diet Order: Regular Diet (changed on 1/24)  Nutrition Order Comments: -  Oral Nutrition Supplement: NSR BID    Evaluation of Received Nutrient/Fluid Intake    I/O: -4.5 L since admit  Energy Calories Required: not meeting needs  Protein Required: not meeting needs  Fluid Required:  (as per MD)  Comments: LBM 1/30 (loose)  Tolerance: tolerating  % Intake of Estimated Energy Needs: 0 - 25 %  % Meal Intake: 0 - 25 %    Nutrition Risk    Level of Risk/Frequency of Follow-up:  (1x/week)     Monitor and Evaluation    Food and Nutrient Intake: energy intake, food and beverage intake  Food and Nutrient Adminstration: diet order  Knowledge/Beliefs/Attitudes: food and nutrition knowledge/skill, beliefs and attitudes  Physical Activity and Function: nutrition-related ADLs and IADLs  Anthropometric Measurements: height/length, weight, body mass index, weight " change  Biochemical Data, Medical Tests and Procedures: electrolyte and renal panel, gastrointestinal profile, glucose/endocrine profile, inflammatory profile, lipid profile  Nutrition-Focused Physical Findings: overall appearance     Nutrition Follow-Up    RD Follow-up?: Yes    Ebony Arteaga RD, JABARIN

## 2024-01-31 NOTE — PLAN OF CARE
[facility] Plan of Care Note  Dx Malignant carcinoid tumor of bronchus     Shift Events Pt had uneventful shift.      Goals of Care: Goals of care ongoing and progressing.     Neuro: A/AOx4     Vital Signs: Stable.     Respiratory: 2L NC     Diet: Full liquid+ Novasource     Is patient tolerating current diet? Poor appetite     GTTS: NA     Urine Output/Bowel Movement: Oliguria; No BM this shift     Drains/Tubes/Tube Feeds (include total output/shift): NA     Lines: Midline IV, HD catheter, left arm AV fistula     Accuchecks: AC/HS     Skin: Incision on right upper back      Fall Risk Score: High     Activity level? X2 person assist from bed to chair / BSC- cannot stand up for too long     Any scheduled procedures? NA     Any safety concerns? Fall risk Precaution     Problem: Adult Inpatient Plan of Care  Goal: Plan of Care Review  Outcome: Ongoing, Progressing  Goal: Patient-Specific Goal (Individualized)  Outcome: Ongoing, Progressing  Goal: Absence of Hospital-Acquired Illness or Injury  Outcome: Ongoing, Progressing  Goal: Optimal Comfort and Wellbeing  Outcome: Ongoing, Progressing  Goal: Readiness for Transition of Care  Outcome: Ongoing, Progressing     Problem: Diabetes Comorbidity  Goal: Blood Glucose Level Within Targeted Range  Outcome: Ongoing, Progressing     Problem: Infection  Goal: Absence of Infection Signs and Symptoms  Outcome: Ongoing, Progressing     Problem: Device-Related Complication Risk (Hemodialysis)  Goal: Safe, Effective Therapy Delivery  Outcome: Ongoing, Progressing     Problem: Hemodynamic Instability (Hemodialysis)  Goal: Effective Tissue Perfusion  Outcome: Ongoing, Progressing     Problem: Infection (Hemodialysis)  Goal: Absence of Infection Signs and Symptoms  Outcome: Ongoing, Progressing     Problem: Fall Injury Risk  Goal: Absence of Fall and Fall-Related Injury  Outcome: Ongoing, Progressing     Problem: Skin Injury Risk Increased  Goal: Skin Health and Integrity  Outcome: Ongoing,  Progressing     Problem: Device-Related Complication Risk (CRRT (Continuous Renal Replacement Therapy))  Goal: Safe, Effective Therapy Delivery  Outcome: Ongoing, Progressing     Problem: Hypothermia (CRRT (Continuous Renal Replacement Therapy))  Goal: Body Temperature Maintained in Desired Range  Outcome: Ongoing, Progressing     Problem: Infection (CRRT (Continuous Renal Replacement Therapy))  Goal: Absence of Infection Signs and Symptoms  Outcome: Ongoing, Progressing     Problem: Fluid Imbalance (Pneumonia)  Goal: Fluid Balance  Outcome: Ongoing, Progressing     Problem: Infection (Pneumonia)  Goal: Resolution of Infection Signs and Symptoms  Outcome: Ongoing, Progressing     Problem: Respiratory Compromise (Pneumonia)  Goal: Effective Oxygenation and Ventilation  Outcome: Ongoing, Progressing     Problem: Electrolyte Imbalance (Chronic Kidney Disease)  Goal: Electrolyte Balance  Outcome: Ongoing, Progressing     Problem: Fluid Volume Excess (Chronic Kidney Disease)  Goal: Fluid Balance  Outcome: Ongoing, Progressing

## 2024-01-31 NOTE — ASSESSMENT & PLAN NOTE
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 Stepped up to the ICU 1/5 for afib RVR which was refractory to medical management and c/b development of tachy-eulogio syndrome now s/p micra placement 1/16 for tachy-eulogio syndrome in the setting of RVR. Did have brief SBO type picture 1/7 which resolved with conservative management.     - s/p R thoracotomy with R lower lobectomy for carcinoid w/ MLND; IPV injury requiring repair. CT removed post operatively.  - Refractory afib RVR requiring ICU step up 1/05, s/p chemical cardioversion initially with recurrence c/b developing tachy-eulogio syndrome    - cards and EP following, appreciate assistance            - Plan for holter monitor 30 days after discharge followed by EP outpatient follow up    - s/p TVP placement and now micra implantation 1/16    - Heparin gtt resumed 1/22; will need to transition to eliquis likely following CT removal; held 1/23 2/2 GI bleed     - Heparin ppx 1/29  - s/p R chest tube placement with IR 1/17, L chest tube placement 1/18; L CT removed 1/25; Last R CT out 1/28  - Pulmonary toilet: IS, Acapella, Mucomyst daily; CPT ordered  - Infectious work up 2/2 leukocytosis. She remains afebrile. Effusions and compressive atelectasis also likely contributing to leukocytosis.Trended up to 30k 1/16, now downtrending. Zosyn 1/11-1/15; Meropenam 1/16 - 1/20; Plan for Cefpodoxime and metronidazole to start 1/21 per ID. 4 week abx plan for empiric treatment of presumed parapneumonic effusion - end date 2/15/2024.    - ID following, appreciate assistance. Reconsult following thora results.   - Bcx 1/11: Staph, probable contaminant  - Bcx1/12: No growth  - Rapid blood PCR: staph epi  - Sputum Cx 1/11: klebsiella & e. Coli  - R pleural fluid Cx 1/17: No growth (fungal  pending still, neg KOH)  - SBO type episode 1/07 requiring NGT decompression, which resolved with conservative management   - GI bleed 1/23/2024 - heparin held, 2uPRBC, 1uFFP, heparin reversed. GI consulted                       - EGD 1/24 - could not visualize stomach much 2/2 food residue; EGD aborted                       - 2u PRBC overnight 1/25 for Hgb 6.3, slow downtrend. Repeat EGD 1/26 demonstrates non-bleeding ulcers    - Continue protonix. GI signed off. Repeat scope in 8 weeks. F/u H pylori   - Diabetic diet diet, novasource renal BID; marinol for appetite.   - Bowel regimen, PPI  - Nephrology following, appreciate assistance. CRRT 1/17 overnight. HD trial 1/19 passed but requiring midodrine, 1.5 L LR, metoprolol for RVR. HD 1/26           - LUE AVF created 10.2023, US 1/22/2023 with good flow metrics, okay to use per vascular   - Oxybutinin for bladder spasms   - MM pain control as needed  - Continue PT/OT, mobilization, likely post hospital placement; engaged social work

## 2024-01-31 NOTE — PROCEDURES
[facility] NOTE    Lily Raisa Green is a 73 y.o. female currently on hemodialysis for removal of uremic toxins and volume.     Patient seen and evaluated on hemodialysis, tolerating treatment, see HD flowsheet for vitals and assessments.    No Hypotension, chest pain, shortness of breath, cramping, nausea or vomiting.      Labs have been reviewed and the dialysate bath has been adjusted.     Labs:     Recent Labs   Lab 01/29/24  0426 01/30/24  0456 01/31/24  0420   * 136 133*   K 4.5 2.9* 3.7    110 102   CO2 20* 19* 22*   BUN 52* 28* 39*   CREATININE 4.6* 2.7* 4.0*   CALCIUM 8.5* 6.9* 8.2*   PHOS 4.9* 3.0 3.0     Recent Labs   Lab 01/30/24  0456 01/30/24  1414 01/31/24  0420   WBC 5.62 7.40 7.32   HGB 8.0* 9.6* 9.1*   HCT 25.6* 30.5* 29.3*   PLT 85* 121* 135*     Lab Results   Component Value Date    FESATURATED 11 (L) 03/01/2023    FERRITIN 243 03/01/2023        Assessment/Plan      Ultrafiltration goal: Liters: 2L. Duration:  3.5 hrs    Monday/Wednesday/Friday    - Seen on dialysis today, tolerating session with current UFR, no complications.    - No lab stick/BP intake on access site  - Continue to monitor intake and output, daily weights   - Please avoid gadolinium, fleets, phos-based laxatives, NSAIDs  - Will follow closely and continue dialysis treatments while in-patient    Anemia  - Hgb 9.1, Continue ALETHEA with dialysis treatments    BMM  - Renal diet with protein intake goal 1.5 g/kg/d if appropriate   - Novasource with meals   - F/U PO4, Mg, Calcium. And albumin levels.   - Phos 3.0. No binders.     HTN  - BP Normal  - Goal for BP <140mmHg SBP and <90 mmHg DBP. Maintain MAP > 65 mmHg.  - Continue home antihypertensive regimen; adjust as needed.       Makenzie Melissa, BRITTANI, APRN, FNP-C  Nephrology Department  Pager:  [phone]

## 2024-01-31 NOTE — PT/OT/SLP PROGRESS
Occupational Therapy      Patient Name:  Lily Green   MRN:  [id number]    Patient not seen today secondary to Dialysis. Will follow-up .    1/31/2024

## 2024-02-01 LAB
ALBUMIN SERPL BCP-MCNC: 2 G/DL (ref 3.5–5.2)
ALP SERPL-CCNC: 95 U/L (ref 55–135)
ALT SERPL W/O P-5'-P-CCNC: 6 U/L (ref 10–44)
ANION GAP SERPL CALC-SCNC: 8 MMOL/L (ref 8–16)
AST SERPL-CCNC: 13 U/L (ref 10–40)
BASOPHILS # BLD AUTO: 0.02 K/UL (ref 0–0.2)
BASOPHILS NFR BLD: 0.3 % (ref 0–1.9)
BILIRUB SERPL-MCNC: 0.5 MG/DL (ref 0.1–1)
BUN SERPL-MCNC: 22 MG/DL (ref 8–23)
CALCIUM SERPL-MCNC: 8.2 MG/DL (ref 8.7–10.5)
CHLORIDE SERPL-SCNC: 105 MMOL/L (ref 95–110)
CO2 SERPL-SCNC: 23 MMOL/L (ref 23–29)
CREAT SERPL-MCNC: 3.1 MG/DL (ref 0.5–1.4)
DIFFERENTIAL METHOD BLD: ABNORMAL
EOSINOPHIL # BLD AUTO: 0.1 K/UL (ref 0–0.5)
EOSINOPHIL NFR BLD: 1.6 % (ref 0–8)
ERYTHROCYTE [DISTWIDTH] IN BLOOD BY AUTOMATED COUNT: 25.4 % (ref 11.5–14.5)
EST. GFR  (NO RACE VARIABLE): 15.3 ML/MIN/1.73 M^2
GLUCOSE SERPL-MCNC: 93 MG/DL (ref 70–110)
HBV CORE AB SERPL QL IA: NORMAL
HBV SURFACE AB SER-ACNC: 107.92 MIU/ML
HBV SURFACE AB SER-ACNC: REACTIVE M[IU]/ML
HBV SURFACE AG SERPL QL IA: NORMAL
HCT VFR BLD AUTO: 27.9 % (ref 37–48.5)
HGB BLD-MCNC: 8.9 G/DL (ref 12–16)
IMM GRANULOCYTES # BLD AUTO: 0.02 K/UL (ref 0–0.04)
IMM GRANULOCYTES NFR BLD AUTO: 0.3 % (ref 0–0.5)
LYMPHOCYTES # BLD AUTO: 0.6 K/UL (ref 1–4.8)
LYMPHOCYTES NFR BLD: 9.2 % (ref 18–48)
MAGNESIUM SERPL-MCNC: 1.8 MG/DL (ref 1.6–2.6)
MCH RBC QN AUTO: 29.7 PG (ref 27–31)
MCHC RBC AUTO-ENTMCNC: 31.9 G/DL (ref 32–36)
MCV RBC AUTO: 93 FL (ref 82–98)
MONOCYTES # BLD AUTO: 0.7 K/UL (ref 0.3–1)
MONOCYTES NFR BLD: 11.5 % (ref 4–15)
NEUTROPHILS # BLD AUTO: 4.7 K/UL (ref 1.8–7.7)
NEUTROPHILS NFR BLD: 77.1 % (ref 38–73)
NRBC BLD-RTO: 0 /100 WBC
PHOSPHATE SERPL-MCNC: 2.3 MG/DL (ref 2.7–4.5)
PLATELET # BLD AUTO: 111 K/UL (ref 150–450)
PMV BLD AUTO: 10.2 FL (ref 9.2–12.9)
POCT GLUCOSE: 103 MG/DL (ref 70–110)
POCT GLUCOSE: 129 MG/DL (ref 70–110)
POCT GLUCOSE: 137 MG/DL (ref 70–110)
POCT GLUCOSE: 152 MG/DL (ref 70–110)
POTASSIUM SERPL-SCNC: 3.6 MMOL/L (ref 3.5–5.1)
PROT SERPL-MCNC: 5.1 G/DL (ref 6–8.4)
RBC # BLD AUTO: 3 M/UL (ref 4–5.4)
SODIUM SERPL-SCNC: 136 MMOL/L (ref 136–145)
WBC # BLD AUTO: 6.11 K/UL (ref 3.9–12.7)

## 2024-02-01 PROCEDURE — 86706 HEP B SURFACE ANTIBODY: CPT | Performed by: STUDENT IN AN ORGANIZED HEALTH CARE EDUCATION/TRAINING PROGRAM

## 2024-02-01 PROCEDURE — 20600001 HC STEP DOWN PRIVATE ROOM

## 2024-02-01 PROCEDURE — 27000646 HC AEROBIKA DEVICE

## 2024-02-01 PROCEDURE — A4216 STERILE WATER/SALINE, 10 ML: HCPCS | Performed by: STUDENT IN AN ORGANIZED HEALTH CARE EDUCATION/TRAINING PROGRAM

## 2024-02-01 PROCEDURE — 63600175 PHARM REV CODE 636 W HCPCS

## 2024-02-01 PROCEDURE — 97530 THERAPEUTIC ACTIVITIES: CPT | Mod: CQ

## 2024-02-01 PROCEDURE — 87340 HEPATITIS B SURFACE AG IA: CPT

## 2024-02-01 PROCEDURE — 97116 GAIT TRAINING THERAPY: CPT | Mod: CQ

## 2024-02-01 PROCEDURE — 94640 AIRWAY INHALATION TREATMENT: CPT

## 2024-02-01 PROCEDURE — 94799 UNLISTED PULMONARY SVC/PX: CPT | Mod: XB

## 2024-02-01 PROCEDURE — 36415 COLL VENOUS BLD VENIPUNCTURE: CPT | Performed by: STUDENT IN AN ORGANIZED HEALTH CARE EDUCATION/TRAINING PROGRAM

## 2024-02-01 PROCEDURE — 84100 ASSAY OF PHOSPHORUS: CPT

## 2024-02-01 PROCEDURE — 27000221 HC OXYGEN, UP TO 24 HOURS

## 2024-02-01 PROCEDURE — 25000003 PHARM REV CODE 250

## 2024-02-01 PROCEDURE — 25000242 PHARM REV CODE 250 ALT 637 W/ HCPCS: Performed by: STUDENT IN AN ORGANIZED HEALTH CARE EDUCATION/TRAINING PROGRAM

## 2024-02-01 PROCEDURE — 94668 MNPJ CHEST WALL SBSQ: CPT

## 2024-02-01 PROCEDURE — 25000003 PHARM REV CODE 250: Performed by: SURGERY

## 2024-02-01 PROCEDURE — 25000003 PHARM REV CODE 250: Performed by: STUDENT IN AN ORGANIZED HEALTH CARE EDUCATION/TRAINING PROGRAM

## 2024-02-01 PROCEDURE — 94664 DEMO&/EVAL PT USE INHALER: CPT

## 2024-02-01 PROCEDURE — 83735 ASSAY OF MAGNESIUM: CPT

## 2024-02-01 PROCEDURE — 63600175 PHARM REV CODE 636 W HCPCS: Performed by: STUDENT IN AN ORGANIZED HEALTH CARE EDUCATION/TRAINING PROGRAM

## 2024-02-01 PROCEDURE — 99900035 HC TECH TIME PER 15 MIN (STAT)

## 2024-02-01 PROCEDURE — 94761 N-INVAS EAR/PLS OXIMETRY MLT: CPT

## 2024-02-01 PROCEDURE — 25000242 PHARM REV CODE 250 ALT 637 W/ HCPCS

## 2024-02-01 PROCEDURE — 86704 HEP B CORE ANTIBODY TOTAL: CPT | Performed by: STUDENT IN AN ORGANIZED HEALTH CARE EDUCATION/TRAINING PROGRAM

## 2024-02-01 PROCEDURE — 80053 COMPREHEN METABOLIC PANEL: CPT | Performed by: STUDENT IN AN ORGANIZED HEALTH CARE EDUCATION/TRAINING PROGRAM

## 2024-02-01 PROCEDURE — 85025 COMPLETE CBC W/AUTO DIFF WBC: CPT | Performed by: STUDENT IN AN ORGANIZED HEALTH CARE EDUCATION/TRAINING PROGRAM

## 2024-02-01 RX ORDER — HEPARIN SODIUM 1000 [USP'U]/ML
1000 INJECTION, SOLUTION INTRAVENOUS; SUBCUTANEOUS
Status: ACTIVE | OUTPATIENT
Start: 2024-02-02 | End: 2024-02-03

## 2024-02-01 RX ORDER — SODIUM CHLORIDE 9 MG/ML
INJECTION, SOLUTION INTRAVENOUS ONCE
Status: COMPLETED | OUTPATIENT
Start: 2024-02-02 | End: 2024-02-02

## 2024-02-01 RX ADMIN — ACETYLCYSTEINE 4 ML: 100 INHALANT RESPIRATORY (INHALATION) at 03:02

## 2024-02-01 RX ADMIN — CEFPODOXIME PROXETIL 200 MG: 200 TABLET, FILM COATED ORAL at 08:02

## 2024-02-01 RX ADMIN — PANTOPRAZOLE SODIUM 40 MG: 40 TABLET, DELAYED RELEASE ORAL at 06:02

## 2024-02-01 RX ADMIN — HEPARIN SODIUM 5000 UNITS: 5000 INJECTION INTRAVENOUS; SUBCUTANEOUS at 09:02

## 2024-02-01 RX ADMIN — MIDODRINE HYDROCHLORIDE 10 MG: 5 TABLET ORAL at 08:02

## 2024-02-01 RX ADMIN — MIDODRINE HYDROCHLORIDE 10 MG: 5 TABLET ORAL at 04:02

## 2024-02-01 RX ADMIN — OXYBUTYNIN CHLORIDE 5 MG: 5 TABLET ORAL at 03:02

## 2024-02-01 RX ADMIN — METRONIDAZOLE 500 MG: 500 TABLET ORAL at 08:02

## 2024-02-01 RX ADMIN — Medication 10 ML: at 05:02

## 2024-02-01 RX ADMIN — OXYBUTYNIN CHLORIDE 5 MG: 5 TABLET ORAL at 08:02

## 2024-02-01 RX ADMIN — ACETYLCYSTEINE 4 ML: 100 INHALANT RESPIRATORY (INHALATION) at 08:02

## 2024-02-01 RX ADMIN — Medication 10 ML: at 06:02

## 2024-02-01 RX ADMIN — SENNOSIDES AND DOCUSATE SODIUM 1 TABLET: 8.6; 5 TABLET ORAL at 08:02

## 2024-02-01 RX ADMIN — METOPROLOL TARTRATE 12.5 MG: 25 TABLET, FILM COATED ORAL at 08:02

## 2024-02-01 RX ADMIN — ERYTHROMYCIN ETHYLSUCCINATE 200 MG: 200 SUSPENSION ORAL at 08:02

## 2024-02-01 RX ADMIN — VENLAFAXINE 37.5 MG: 37.5 TABLET ORAL at 08:02

## 2024-02-01 RX ADMIN — INSULIN ASPART 2 UNITS: 100 INJECTION, SOLUTION INTRAVENOUS; SUBCUTANEOUS at 12:02

## 2024-02-01 RX ADMIN — FLUCONAZOLE 200 MG: 200 TABLET ORAL at 08:02

## 2024-02-01 RX ADMIN — ERYTHROMYCIN ETHYLSUCCINATE 200 MG: 200 SUSPENSION ORAL at 11:02

## 2024-02-01 RX ADMIN — Medication 10 ML: at 12:02

## 2024-02-01 RX ADMIN — ONDANSETRON 8 MG: 2 INJECTION INTRAMUSCULAR; INTRAVENOUS at 12:02

## 2024-02-01 RX ADMIN — HEPARIN SODIUM 5000 UNITS: 5000 INJECTION INTRAVENOUS; SUBCUTANEOUS at 06:02

## 2024-02-01 RX ADMIN — HEPARIN SODIUM 5000 UNITS: 5000 INJECTION INTRAVENOUS; SUBCUTANEOUS at 01:02

## 2024-02-01 RX ADMIN — DRONABINOL 5 MG: 2.5 CAPSULE ORAL at 08:02

## 2024-02-01 RX ADMIN — Medication 10 ML: at 11:02

## 2024-02-01 RX ADMIN — ERYTHROMYCIN ETHYLSUCCINATE 200 MG: 200 SUSPENSION ORAL at 04:02

## 2024-02-01 RX ADMIN — PANTOPRAZOLE SODIUM 40 MG: 40 TABLET, DELAYED RELEASE ORAL at 04:02

## 2024-02-01 RX ADMIN — ACETAMINOPHEN 650 MG: 325 TABLET ORAL at 09:02

## 2024-02-01 RX ADMIN — ALBUTEROL SULFATE 2.5 MG: 2.5 SOLUTION RESPIRATORY (INHALATION) at 03:02

## 2024-02-01 RX ADMIN — ALBUTEROL SULFATE 2.5 MG: 2.5 SOLUTION RESPIRATORY (INHALATION) at 08:02

## 2024-02-01 RX ADMIN — LIDOCAINE 5% 1 PATCH: 700 PATCH TOPICAL at 08:02

## 2024-02-01 NOTE — PLAN OF CARE
Problem: Adult Inpatient Plan of Care  Goal: Plan of Care Review  Outcome: Ongoing, Progressing     Problem: Diabetes Comorbidity  Goal: Blood Glucose Level Within Targeted Range  Outcome: Ongoing, Progressing     Problem: Infection  Goal: Absence of Infection Signs and Symptoms  Outcome: Ongoing, Progressing     Problem: Fall Injury Risk  Goal: Absence of Fall and Fall-Related Injury  Outcome: Ongoing, Progressing     Problem: Skin Injury Risk Increased  Goal: Skin Health and Integrity  Outcome: Ongoing, Progressing

## 2024-02-01 NOTE — CARE UPDATE
-Glucose Goal 140-180    -A1C:   Hemoglobin A1C   Date Value Ref Range Status   11/15/2023 4.9 4.0 - 5.6 % Final     Comment:     ADA Screening Guidelines:  5.7-6.4%  Consistent with prediabetes  >or=6.5%  Consistent with diabetes    High levels of fetal hemoglobin interfere with the HbA1C  assay. Heterozygous hemoglobin variants (HbS, HgC, etc)do  not significantly interfere with this assay.   However, presence of multiple variants may affect accuracy.     10/23/2019 6.8 % Final         -HOME REGIMEN:   LDC SSI   Blood sugar 150 to 200, + 1 unit  Blood sugar 201 to 250, + 2 units  Blood sugar 251 to 300, + 3 units  Blood sugar 301 to 350, + 4 units   Blood sugar greater than 350, + 5 units      -GLUCOSE TREND FOR THE PAST 24HRS:   Recent Labs   Lab 01/30/24  2210 01/31/24  0755 01/31/24  1231 01/31/24  1548 01/31/24  1957 02/01/24  0723   POCTGLUCOSE 194* 124* 80 122* 157* 129*           -NO HYPOGYCEMIAS NOTED     - Diet  Diet diabetic 2000 Calorie    -TOLERATING 0-25 % OF PO DIET. Insulin needs continue to vary as patient's nutritional status is inconsistent. Will continue to titrate insulin based on current needs.       Plan:   - d/c levemir as fasting blood sugar remains below goal   - Continue Novolog Mod dose correction with ISF 25 starting at 150 prn   - POCT Glucose before meals and at bedtime   - Hypoglycemia protocol in place      ** Please notify Endocrine for any change and/or advance in diet**  ** Please call Endocrine for any BG related issues **     Discharge Planning:   TBD. Please notify endocrinology prior to discharge.

## 2024-02-01 NOTE — PROGRESS NOTES
Jeovanny Dow - [facility]  General Surgery  Progress Note    Subjective:     History of Present Illness:  No notes on file    Post-Op Info:  Procedure(s) (LRB):  EGD (ESOPHAGOGASTRODUODENOSCOPY) (N/A)   6 Days Post-Op     Interval History:   -NAEO  -iHD yesterday  -Feels well  -Denied by LTAC    Medications:  Continuous Infusions:  Scheduled Meds:   acetylcysteine 100 mg/ml (10%)  4 mL Nebulization TID WAKE    cefpodoxime  200 mg Oral QHS    droNABinol  5 mg Oral BID    epoetin noble (PROCRIT) injection  4,000 Units Intravenous Every Mon, Wed, Fri    erythromycin ethylsuccinate  200 mg Oral QID (WM & HS)    fluconazole  200 mg Oral QHS    heparin (porcine)  5,000 Units Subcutaneous Q8H    insulin detemir U-100  4 Units Subcutaneous Daily    LIDOcaine  1 patch Transdermal Q24H    metoprolol tartrate  12.5 mg Oral BID    metroNIDAZOLE  500 mg Oral Q12H    midodrine  10 mg Oral BID WM    oxybutynin  5 mg Oral TID    pantoprazole  40 mg Oral BID AC    senna-docusate 8.6-50 mg  1 tablet Oral Daily    sodium chloride 0.9%  10 mL Intravenous Q6H    venlafaxine  37.5 mg Oral Daily     PRN Meds:sodium chloride 0.9%, acetaminophen, albuterol sulfate, bisacodyL, dextrose 10%, dextrose 10%, glucagon (human recombinant), glucose, glucose, heparin (porcine), hydrALAZINE, insulin aspart U-100, midodrine, ondansetron, sodium chloride 0.9%, Flushing PICC/Midline Protocol **AND** sodium chloride 0.9% **AND** sodium chloride 0.9%     Review of patient's allergies indicates:   Allergen Reactions    Crestor [rosuvastatin] Swelling    Gluten protein      Objective:     Vital Signs (Most Recent):  Temp: 97.4 °F (36.3 °C) (02/01/24 0724)  Pulse: 72 (02/01/24 0724)  Resp: 18 (02/01/24 0724)  BP: (!) 147/68 (02/01/24 0724)  SpO2: 95 % (02/01/24 0724) Vital Signs (24h Range):  Temp:  [97.4 °F (36.3 °C)-97.9 °F (36.6 °C)] 97.4 °F (36.3 °C)  Pulse:  [67-79] 72  Resp:  [17-18] 18  SpO2:  [94 %-100 %] 95 %  BP: (113-160)/(55-99) 147/68     Weight: 67.6 kg  (149 lb)  Body mass index is 24.79 kg/m².    Intake/Output - Last 3 Shifts         01/30 0700  01/31 0659 01/31 0700  02/01 0659 02/01 0700  02/02 0659    P.O. 1090      I.V. (mL/kg)  347.7 (5.1)     Other  300     Total Intake(mL/kg) 1090 (16.1) 647.7 (9.6)     Urine (mL/kg/hr) 300 (0.2)      Other  2650     Stool 0      Total Output 300 2650     Net +790 -2002.3            Urine Occurrence  1 x     Stool Occurrence 4 x 1 x              Physical Exam  Vitals and nursing note reviewed.   Constitutional:       Appearance: Normal appearance.   HENT:      Head: Normocephalic and atraumatic.      Nose: Nose normal.   Cardiovascular:      Rate and Rhythm: Normal rate and regular rhythm.      Pulses: Normal pulses.   Pulmonary:      Effort: Pulmonary effort is normal. No respiratory distress.      Comments: Right posterior chest incision c/d/I  Right chest permacath    Abdominal:      General: Abdomen is flat. There is no distension.      Palpations: Abdomen is soft.      Tenderness: There is no abdominal tenderness.      Comments: Soft, nontender abdomen   Musculoskeletal:      Right lower leg: No edema.      Left lower leg: No edema.   Skin:     General: Skin is warm and dry.      Comments: Groin soft, dressings c/d/i   Neurological:      General: No focal deficit present.      Mental Status: She is alert.          Significant Labs:  I have reviewed all pertinent lab results within the past 24 hours.  CBC:   Recent Labs   Lab 02/01/24  0447   WBC 6.11   RBC 3.00*   HGB 8.9*   HCT 27.9*   *   MCV 93   MCH 29.7   MCHC 31.9*     CMP:   Recent Labs   Lab 02/01/24  0447   GLU 93   CALCIUM 8.2*   ALBUMIN 2.0*   PROT 5.1*      K 3.6   CO2 23      BUN 22   CREATININE 3.1*   ALKPHOS 95   ALT 6*   AST 13   BILITOT 0.5       Significant Diagnostics:  I have reviewed all pertinent imaging results/findings within the past 24 hours.  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 Stepped up to the ICU 1/5 for afib RVR which was refractory to medical management and c/b development of tachy-eulogio syndrome now s/p micra placement 1/16 for tachy-eulogio syndrome in the setting of RVR. Did have brief SBO type picture 1/7 which resolved with conservative management.     - s/p R thoracotomy with R lower lobectomy for carcinoid w/ MLND; IPV injury requiring repair. CT removed post operatively.  - Refractory afib RVR requiring ICU step up 1/05, s/p chemical cardioversion initially with recurrence c/b developing tachy-eulogio syndrome    - cards and EP following, appreciate assistance            - Plan for holter monitor 30 days after discharge followed by EP outpatient follow up    - s/p TVP placement and now micra implantation 1/16    - Heparin gtt resumed 1/22; will need to transition to eliquis likely following CT removal; held 1/23 2/2 GI bleed     - Heparin ppx 1/29  - s/p R chest tube placement with IR 1/17, L chest tube placement 1/18; L CT removed 1/25; Last R CT out 1/28  - Pulmonary toilet: IS, Acapella, Mucomyst daily; CPT ordered  - Infectious work up 2/2 leukocytosis. She remains afebrile. Effusions and compressive atelectasis also likely contributing to leukocytosis.Trended up to 30k 1/16, now downtrending. Zosyn 1/11-1/15; Meropenam 1/16 - 1/20; Plan for Cefpodoxime and metronidazole to start 1/21 per ID. 4 week abx plan for empiric treatment of presumed parapneumonic effusion - end date 2/15/2024.    - ID following, appreciate assistance. Reconsult following thora results.   - Bcx 1/11: Staph, probable contaminant  - Bcx1/12: No growth  - Rapid blood PCR: staph epi  - Sputum Cx 1/11: klebsiella & e. Coli  - R pleural fluid Cx 1/17: No growth (fungal pending still, neg  KOH)  - SBO type episode 1/07 requiring NGT decompression, which resolved with conservative management   - GI bleed 1/23/2024 - heparin held, 2uPRBC, 1uFFP, heparin reversed. GI consulted                       - EGD 1/24 - could not visualize stomach much 2/2 food residue; EGD aborted                       - 2u PRBC overnight 1/25 for Hgb 6.3, slow downtrend. Repeat EGD 1/26 demonstrates non-bleeding ulcers    - Continue protonix. GI signed off. Repeat scope in 8 weeks. F/u H pylori   - Diabetic diet diet, novasource renal BID; marinol for appetite.   - Bowel regimen, PPI  - Nephrology following, appreciate assistance. CRRT 1/17 overnight. HD trial 1/19 passed but requiring midodrine, 1.5 L LR, metoprolol for RVR. HD 1/26           - LUE AVF created 10.2023, US 1/22/2023 with good flow metrics, okay to use per vascular   - Oxybutinin for bladder spasms   - MM pain control as needed  - Continue PT/OT, mobilization, likely post hospital placement; engaged social work    Dispo: Pending appropriate discharge acceptance. Exploring SNFs today        Mike Lam MD  General Surgery  [facility]

## 2024-02-01 NOTE — PLAN OF CARE
[facility] Plan of Care Note  Dx Malignant carcinoid tumor of bronchus     Shift Events Pt had uneventful shift.      Goals of Care: Goals of care ongoing and progressing.     Neuro: A/AOx4     Vital Signs: Stable.     Respiratory: 2L NC     Diet: Diabetic diet+ Novasource     Is patient tolerating current diet? Poor appetite     GTTS: NA     Urine Output/Bowel Movement: Oliguria; No BM this shift     Drains/Tubes/Tube Feeds (include total output/shift): NA     Lines: Midline IV, HD catheter, left arm AV fistula     Accuchecks: AC/HS     Skin: Incision on right upper back      Fall Risk Score: High     Activity level? X2 person assist from bed to chair / BSC- cannot stand up for too long     Any scheduled procedures? NA     Any safety concerns? Fall risk Precaution    Problem: Adult Inpatient Plan of Care  Goal: Plan of Care Review  Outcome: Ongoing, Progressing  Goal: Patient-Specific Goal (Individualized)  Outcome: Ongoing, Progressing  Goal: Absence of Hospital-Acquired Illness or Injury  Outcome: Ongoing, Progressing  Goal: Optimal Comfort and Wellbeing  Outcome: Ongoing, Progressing  Goal: Readiness for Transition of Care  Outcome: Ongoing, Progressing     Problem: Diabetes Comorbidity  Goal: Blood Glucose Level Within Targeted Range  Outcome: Ongoing, Progressing     Problem: Infection  Goal: Absence of Infection Signs and Symptoms  Outcome: Ongoing, Progressing     Problem: Device-Related Complication Risk (Hemodialysis)  Goal: Safe, Effective Therapy Delivery  Outcome: Ongoing, Progressing     Problem: Hemodynamic Instability (Hemodialysis)  Goal: Effective Tissue Perfusion  Outcome: Ongoing, Progressing     Problem: Infection (Hemodialysis)  Goal: Absence of Infection Signs and Symptoms  Outcome: Ongoing, Progressing     Problem: Fall Injury Risk  Goal: Absence of Fall and Fall-Related Injury  Outcome: Ongoing, Progressing     Problem: Skin Injury Risk Increased  Goal: Skin Health and Integrity  Outcome:  Ongoing, Progressing     Problem: Device-Related Complication Risk (CRRT (Continuous Renal Replacement Therapy))  Goal: Safe, Effective Therapy Delivery  Outcome: Ongoing, Progressing     Problem: Hypothermia (CRRT (Continuous Renal Replacement Therapy))  Goal: Body Temperature Maintained in Desired Range  Outcome: Ongoing, Progressing     Problem: Infection (CRRT (Continuous Renal Replacement Therapy))  Goal: Absence of Infection Signs and Symptoms  Outcome: Ongoing, Progressing     Problem: Fluid Imbalance (Pneumonia)  Goal: Fluid Balance  Outcome: Ongoing, Progressing     Problem: Infection (Pneumonia)  Goal: Resolution of Infection Signs and Symptoms  Outcome: Ongoing, Progressing     Problem: Respiratory Compromise (Pneumonia)  Goal: Effective Oxygenation and Ventilation  Outcome: Ongoing, Progressing     Problem: Electrolyte Imbalance (Chronic Kidney Disease)  Goal: Electrolyte Balance  Outcome: Ongoing, Progressing     Problem: Fluid Volume Excess (Chronic Kidney Disease)  Goal: Fluid Balance  Outcome: Ongoing, Progressing

## 2024-02-01 NOTE — PT/OT/SLP PROGRESS
Physical Therapy Treatment    Patient Name:  Lily Green   MRN:  [id number]    Recommendations:     Discharge Recommendations: Moderate Intensity Therapy  Discharge Equipment Recommendations: to be determined by next level of care  Barriers to discharge: Decreased caregiver support    Assessment:     Lily Green is a 73 y.o. female admitted with a medical diagnosis of Malignant carcinoid tumor of bronchus.  She presents with the following impairments/functional limitations: weakness, impaired balance, decreased safety awareness, impaired endurance, impaired functional mobility, gait instability, decreased lower extremity function .Pt Progressing with PT Intervention. Pt Progressing with improving gait distance however still gets SOB with mobility requiring rest breaks to recover. Pt would continue to benefit from skilled PT to address overall functional mobility, goals , and to return to functional baseline.  Goals remain appropriate.     Rehab Prognosis: Good; patient would benefit from acute skilled PT services to address these deficits and reach maximum level of function.    Recent Surgery: Procedure(s) (LRB):  EGD (ESOPHAGOGASTRODUODENOSCOPY) (N/A) 6 Days Post-Op    Plan:     During this hospitalization, patient to be seen 4 x/week to address the identified rehab impairments via gait training, therapeutic activities, therapeutic exercises, neuromuscular re-education and progress toward the following goals:    Plan of Care Expires:  02/09/24    Subjective     Chief Complaint: SOB after gait    Pain/Comfort:  Pain Rating 1: 0/10  Pain Rating Post-Intervention 1: 0/10      Objective:     Communicated with RN prior to session.  Patient found up in chair with  (peripheral IV; telemetry; oxygen; SCD) upon PT entry to room.     General Precautions: Standard, fall  Orthopedic Precautions: N/A  Braces: N/A  Respiratory Status: Nasal cannula, flow 3 L/min     Functional Mobility:  Transfers:  vcs  for hand placement and safety   Sit to Stand:  minimum/mod assistance with rolling walker    Gait: pt amb with RW x30 ft x 2  with Marian/CGA for safety with chair and O2 in tow. Pt sits in chair without reaching back for chair, pt educated on safety. Pt with vcs for proper breathing technique,safety,upright posture, and pacing.      AM-PAC 6 CLICK MOBILITY  Turning over in bed (including adjusting bedclothes, sheets and blankets)?: 2  Sitting down on and standing up from a chair with arms (e.g., wheelchair, bedside commode, etc.): 3  Moving from lying on back to sitting on the side of the bed?: 2  Moving to and from a bed to a chair (including a wheelchair)?: 3  Need to walk in hospital room?: 2  Climbing 3-5 steps with a railing?: 1  Basic Mobility Total Score: 13       Treatment & Education:  Therapist provided instruction and educated of  patient on progress, safety,d/c,PT POC,   proper body mechanics, energy conservation, and fall prevention strategies during tasks listed above, on the effects of prolonged immobility and the importance of performing OOB activity and exercises to promote healing and reduce recovery time     Updated white board with appropriate PT mobility information for medical team notification   Donned an extra gown      Call nursing/pct to transfer to chair/use bathroom. Pt stated understanding  Bedside table in front of patient and area set up for function, convenience, and safety. RN aware of patient's mobility needs and status. Questions/concerns addressed within PTA scope of practice; patient  with no further questions. Time was provided for active listening, discussion of health disposition, and discussion of safe discharge. Pt?verbalized?agreement     Patient left up in chair with all lines intact, call button in reach, and nsg notified..      GOALS:   Multidisciplinary Problems       Physical Therapy Goals          Problem: Physical Therapy    Goal Priority Disciplines Outcome Goal  Variances Interventions   Physical Therapy Goal     PT, PT/OT Ongoing, Progressing     Description: PT goals until 2/9/24    1. Pt supine to sit with supervision-not met  2. Pt sit to supine with supervision-not met  3. Pt sit to stand with AD if needed with supervision-not met  4. Pt to perform gait 150 ft with AD if needed with supervision.-not met  5. Pt to go up/down curb step with AD if needed with CGA.-not met                         Time Tracking:     PT Received On: 02/01/24  PT Start Time: 1021     PT Stop Time: 1056  PT Total Time (min): 35 min     Billable Minutes: Gait Training 25 and Therapeutic Activity 10    Treatment Type: Treatment  PT/PTA: PTA     Number of PTA visits since last PT visit: 3     02/01/2024

## 2024-02-01 NOTE — PLAN OF CARE
02/01/24 1446   Post-Acute Status   Post-Acute Authorization Placement   Post-Acute Placement Status Pending payor review/awaiting authorization (if required)   Hospital Resources/Appts/Education Provided Provided patient/caregiver with written discharge plan information   Patient choice form signed by patient/caregiver List with quality metrics by geographic area provided   Discharge Delays None known at this time   Discharge Plan   Discharge Plan A Skilled Nursing Facility     Pt and family agreeable with [facility]. Paperwork to be completed on 2/2/24. New HD set up being worked on. BAILEY will continue to monitor.    Yesenia Burciaga LCSW  Case Management/[facility]  [phone]

## 2024-02-01 NOTE — SUBJECTIVE & OBJECTIVE
Interval History:   -NAEO  -iHD yesterday  -Feels well  -Denied by LTAC    Medications:  Continuous Infusions:  Scheduled Meds:   acetylcysteine 100 mg/ml (10%)  4 mL Nebulization TID WAKE    cefpodoxime  200 mg Oral QHS    droNABinol  5 mg Oral BID    epoetin noble (PROCRIT) injection  4,000 Units Intravenous Every Mon, Wed, Fri    erythromycin ethylsuccinate  200 mg Oral QID (WM & HS)    fluconazole  200 mg Oral QHS    heparin (porcine)  5,000 Units Subcutaneous Q8H    insulin detemir U-100  4 Units Subcutaneous Daily    LIDOcaine  1 patch Transdermal Q24H    metoprolol tartrate  12.5 mg Oral BID    metroNIDAZOLE  500 mg Oral Q12H    midodrine  10 mg Oral BID WM    oxybutynin  5 mg Oral TID    pantoprazole  40 mg Oral BID AC    senna-docusate 8.6-50 mg  1 tablet Oral Daily    sodium chloride 0.9%  10 mL Intravenous Q6H    venlafaxine  37.5 mg Oral Daily     PRN Meds:sodium chloride 0.9%, acetaminophen, albuterol sulfate, bisacodyL, dextrose 10%, dextrose 10%, glucagon (human recombinant), glucose, glucose, heparin (porcine), hydrALAZINE, insulin aspart U-100, midodrine, ondansetron, sodium chloride 0.9%, Flushing PICC/Midline Protocol **AND** sodium chloride 0.9% **AND** sodium chloride 0.9%     Review of patient's allergies indicates:   Allergen Reactions    Crestor [rosuvastatin] Swelling    Gluten protein      Objective:     Vital Signs (Most Recent):  Temp: 97.4 °F (36.3 °C) (02/01/24 0724)  Pulse: 72 (02/01/24 0724)  Resp: 18 (02/01/24 0724)  BP: (!) 147/68 (02/01/24 0724)  SpO2: 95 % (02/01/24 0724) Vital Signs (24h Range):  Temp:  [97.4 °F (36.3 °C)-97.9 °F (36.6 °C)] 97.4 °F (36.3 °C)  Pulse:  [67-79] 72  Resp:  [17-18] 18  SpO2:  [94 %-100 %] 95 %  BP: (113-160)/(55-99) 147/68     Weight: 67.6 kg (149 lb)  Body mass index is 24.79 kg/m².    Intake/Output - Last 3 Shifts         01/30 0700 01/31 0659 01/31 0700 02/01 0659 02/01 0700  02/02 0659    P.O. 1090      I.V. (mL/kg)  347.7 (5.1)     Other  300      Total Intake(mL/kg) 1090 (16.1) 647.7 (9.6)     Urine (mL/kg/hr) 300 (0.2)      Other  2650     Stool 0      Total Output 300 2650     Net +790 -2002.3            Urine Occurrence  1 x     Stool Occurrence 4 x 1 x              Physical Exam  Vitals and nursing note reviewed.   Constitutional:       Appearance: Normal appearance.   HENT:      Head: Normocephalic and atraumatic.      Nose: Nose normal.   Cardiovascular:      Rate and Rhythm: Normal rate and regular rhythm.      Pulses: Normal pulses.   Pulmonary:      Effort: Pulmonary effort is normal. No respiratory distress.      Comments: Right posterior chest incision c/d/I  Right chest permacath    Abdominal:      General: Abdomen is flat. There is no distension.      Palpations: Abdomen is soft.      Tenderness: There is no abdominal tenderness.      Comments: Soft, nontender abdomen   Musculoskeletal:      Right lower leg: No edema.      Left lower leg: No edema.   Skin:     General: Skin is warm and dry.      Comments: Groin soft, dressings c/d/i   Neurological:      General: No focal deficit present.      Mental Status: She is alert.          Significant Labs:  I have reviewed all pertinent lab results within the past 24 hours.  CBC:   Recent Labs   Lab 02/01/24  0447   WBC 6.11   RBC 3.00*   HGB 8.9*   HCT 27.9*   *   MCV 93   MCH 29.7   MCHC 31.9*     CMP:   Recent Labs   Lab 02/01/24  0447   GLU 93   CALCIUM 8.2*   ALBUMIN 2.0*   PROT 5.1*      K 3.6   CO2 23      BUN 22   CREATININE 3.1*   ALKPHOS 95   ALT 6*   AST 13   BILITOT 0.5       Significant Diagnostics:  I have reviewed all pertinent imaging results/findings within the past 24 hours.

## 2024-02-01 NOTE — ASSESSMENT & PLAN NOTE
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 Stepped up to the ICU 1/5 for afib RVR which was refractory to medical management and c/b development of tachy-eulogio syndrome now s/p micra placement 1/16 for tachy-eulogio syndrome in the setting of RVR. Did have brief SBO type picture 1/7 which resolved with conservative management.     - s/p R thoracotomy with R lower lobectomy for carcinoid w/ MLND; IPV injury requiring repair. CT removed post operatively.  - Refractory afib RVR requiring ICU step up 1/05, s/p chemical cardioversion initially with recurrence c/b developing tachy-eulogio syndrome    - cards and EP following, appreciate assistance            - Plan for holter monitor 30 days after discharge followed by EP outpatient follow up    - s/p TVP placement and now micra implantation 1/16    - Heparin gtt resumed 1/22; will need to transition to eliquis likely following CT removal; held 1/23 2/2 GI bleed     - Heparin ppx 1/29  - s/p R chest tube placement with IR 1/17, L chest tube placement 1/18; L CT removed 1/25; Last R CT out 1/28  - Pulmonary toilet: IS, Acapella, Mucomyst daily; CPT ordered  - Infectious work up 2/2 leukocytosis. She remains afebrile. Effusions and compressive atelectasis also likely contributing to leukocytosis.Trended up to 30k 1/16, now downtrending. Zosyn 1/11-1/15; Meropenam 1/16 - 1/20; Plan for Cefpodoxime and metronidazole to start 1/21 per ID. 4 week abx plan for empiric treatment of presumed parapneumonic effusion - end date 2/15/2024.    - ID following, appreciate assistance. Reconsult following thora results.   - Bcx 1/11: Staph, probable contaminant  - Bcx1/12: No growth  - Rapid blood PCR: staph epi  - Sputum Cx 1/11: klebsiella & e. Coli  - R pleural fluid Cx 1/17: No growth (fungal  pending still, neg KOH)  - SBO type episode 1/07 requiring NGT decompression, which resolved with conservative management   - GI bleed 1/23/2024 - heparin held, 2uPRBC, 1uFFP, heparin reversed. GI consulted                       - EGD 1/24 - could not visualize stomach much 2/2 food residue; EGD aborted                       - 2u PRBC overnight 1/25 for Hgb 6.3, slow downtrend. Repeat EGD 1/26 demonstrates non-bleeding ulcers    - Continue protonix. GI signed off. Repeat scope in 8 weeks. F/u H pylori   - Diabetic diet diet, novasource renal BID; marinol for appetite.   - Bowel regimen, PPI  - Nephrology following, appreciate assistance. CRRT 1/17 overnight. HD trial 1/19 passed but requiring midodrine, 1.5 L LR, metoprolol for RVR. HD 1/26           - LUE AVF created 10.2023, US 1/22/2023 with good flow metrics, okay to use per vascular   - Oxybutinin for bladder spasms   - MM pain control as needed  - Continue PT/OT, mobilization, likely post hospital placement; engaged social work    Dispo: Pending appropriate discharge acceptance. Exploring SNFs today

## 2024-02-01 NOTE — PLAN OF CARE
Nephrology Chart Review      Intake/Output Summary (Last 24 hours) at 2/1/2024 0925  Last data filed at 1/31/2024 1335  Gross per 24 hour   Intake 647.7 ml   Output 2650 ml   Net -2002.3 ml       Vitals:    01/31/24 2327 02/01/24 0415 02/01/24 0724 02/01/24 0807   BP:  (!) 114/55 (!) 147/68    BP Location:  Right arm Right leg    Patient Position:  Lying Lying    Pulse: 71 73 72 77   Resp:  18 18 18   Temp:  97.6 °F (36.4 °C) 97.4 °F (36.3 °C)    TempSrc:  Oral Oral    SpO2:  (!) 94% 95% 96%   Weight:       Height:           Recent Labs   Lab 01/30/24  0456 01/31/24  0420 02/01/24  0447    133* 136   K 2.9* 3.7 3.6    102 105   CO2 19* 22* 23   BUN 28* 39* 22   CREATININE 2.7* 4.0* 3.1*   CALCIUM 6.9* 8.2* 8.2*   PHOS 3.0 3.0 2.3*         Hemodialysis completed yesterday,  Treatment  received: 3.5 hours  Net fluid removed: 2 liters  No other related issues identified. Please call Nephrology as needed; We will continue to follow.      Abdi Alexis MD  Nephrology Fellow

## 2024-02-02 ENCOUNTER — TELEPHONE (OUTPATIENT)
Dept: ENDOSCOPY | Facility: HOSPITAL | Age: 74
End: 2024-02-02
Payer: MEDICARE

## 2024-02-02 LAB
ALBUMIN SERPL BCP-MCNC: 2.1 G/DL (ref 3.5–5.2)
ALP SERPL-CCNC: 92 U/L (ref 55–135)
ALT SERPL W/O P-5'-P-CCNC: 6 U/L (ref 10–44)
ANION GAP SERPL CALC-SCNC: 11 MMOL/L (ref 8–16)
AST SERPL-CCNC: 17 U/L (ref 10–40)
BASOPHILS # BLD AUTO: 0.03 K/UL (ref 0–0.2)
BASOPHILS NFR BLD: 0.5 % (ref 0–1.9)
BILIRUB SERPL-MCNC: 0.5 MG/DL (ref 0.1–1)
BUN SERPL-MCNC: 26 MG/DL (ref 8–23)
CALCIUM SERPL-MCNC: 8.5 MG/DL (ref 8.7–10.5)
CHLORIDE SERPL-SCNC: 106 MMOL/L (ref 95–110)
CO2 SERPL-SCNC: 21 MMOL/L (ref 23–29)
CREAT SERPL-MCNC: 3.8 MG/DL (ref 0.5–1.4)
DIFFERENTIAL METHOD BLD: ABNORMAL
EOSINOPHIL # BLD AUTO: 0.1 K/UL (ref 0–0.5)
EOSINOPHIL NFR BLD: 1.7 % (ref 0–8)
ERYTHROCYTE [DISTWIDTH] IN BLOOD BY AUTOMATED COUNT: 25 % (ref 11.5–14.5)
EST. GFR  (NO RACE VARIABLE): 12 ML/MIN/1.73 M^2
FERRITIN SERPL-MCNC: 1803 NG/ML (ref 20–300)
GLUCOSE SERPL-MCNC: 64 MG/DL (ref 70–110)
HCT VFR BLD AUTO: 30.5 % (ref 37–48.5)
HGB BLD-MCNC: 9.3 G/DL (ref 12–16)
IMM GRANULOCYTES # BLD AUTO: 0.02 K/UL (ref 0–0.04)
IMM GRANULOCYTES NFR BLD AUTO: 0.3 % (ref 0–0.5)
IRON SERPL-MCNC: 52 UG/DL (ref 30–160)
LYMPHOCYTES # BLD AUTO: 0.5 K/UL (ref 1–4.8)
LYMPHOCYTES NFR BLD: 9 % (ref 18–48)
MAGNESIUM SERPL-MCNC: 1.9 MG/DL (ref 1.6–2.6)
MCH RBC QN AUTO: 29.6 PG (ref 27–31)
MCHC RBC AUTO-ENTMCNC: 30.5 G/DL (ref 32–36)
MCV RBC AUTO: 97 FL (ref 82–98)
MONOCYTES # BLD AUTO: 0.7 K/UL (ref 0.3–1)
MONOCYTES NFR BLD: 12 % (ref 4–15)
NEUTROPHILS # BLD AUTO: 4.4 K/UL (ref 1.8–7.7)
NEUTROPHILS NFR BLD: 76.5 % (ref 38–73)
NRBC BLD-RTO: 0 /100 WBC
PHOSPHATE SERPL-MCNC: 2.5 MG/DL (ref 2.7–4.5)
PLATELET # BLD AUTO: 120 K/UL (ref 150–450)
PMV BLD AUTO: 10.5 FL (ref 9.2–12.9)
POCT GLUCOSE: 110 MG/DL (ref 70–110)
POCT GLUCOSE: 86 MG/DL (ref 70–110)
POTASSIUM SERPL-SCNC: 3.8 MMOL/L (ref 3.5–5.1)
PROT SERPL-MCNC: 5.4 G/DL (ref 6–8.4)
PTH-INTACT SERPL-MCNC: 111.8 PG/ML (ref 9–77)
RBC # BLD AUTO: 3.14 M/UL (ref 4–5.4)
SATURATED IRON: 29 % (ref 20–50)
SODIUM SERPL-SCNC: 138 MMOL/L (ref 136–145)
TOTAL IRON BINDING CAPACITY: 179 UG/DL (ref 250–450)
TRANSFERRIN SERPL-MCNC: 121 MG/DL (ref 200–375)
WBC # BLD AUTO: 5.75 K/UL (ref 3.9–12.7)

## 2024-02-02 PROCEDURE — 94664 DEMO&/EVAL PT USE INHALER: CPT

## 2024-02-02 PROCEDURE — 27000221 HC OXYGEN, UP TO 24 HOURS

## 2024-02-02 PROCEDURE — 82728 ASSAY OF FERRITIN: CPT

## 2024-02-02 PROCEDURE — 63600175 PHARM REV CODE 636 W HCPCS

## 2024-02-02 PROCEDURE — 63600175 PHARM REV CODE 636 W HCPCS: Mod: JZ | Performed by: NURSE PRACTITIONER

## 2024-02-02 PROCEDURE — 36415 COLL VENOUS BLD VENIPUNCTURE: CPT | Performed by: STUDENT IN AN ORGANIZED HEALTH CARE EDUCATION/TRAINING PROGRAM

## 2024-02-02 PROCEDURE — 94761 N-INVAS EAR/PLS OXIMETRY MLT: CPT

## 2024-02-02 PROCEDURE — 25000003 PHARM REV CODE 250

## 2024-02-02 PROCEDURE — 90935 HEMODIALYSIS ONE EVALUATION: CPT

## 2024-02-02 PROCEDURE — 94640 AIRWAY INHALATION TREATMENT: CPT

## 2024-02-02 PROCEDURE — 25000003 PHARM REV CODE 250: Performed by: STUDENT IN AN ORGANIZED HEALTH CARE EDUCATION/TRAINING PROGRAM

## 2024-02-02 PROCEDURE — 25000003 PHARM REV CODE 250: Performed by: SURGERY

## 2024-02-02 PROCEDURE — 94668 MNPJ CHEST WALL SBSQ: CPT

## 2024-02-02 PROCEDURE — 99232 SBSQ HOSP IP/OBS MODERATE 35: CPT | Mod: ,,, | Performed by: INTERNAL MEDICINE

## 2024-02-02 PROCEDURE — 83540 ASSAY OF IRON: CPT

## 2024-02-02 PROCEDURE — 20600001 HC STEP DOWN PRIVATE ROOM

## 2024-02-02 PROCEDURE — 85025 COMPLETE CBC W/AUTO DIFF WBC: CPT | Performed by: STUDENT IN AN ORGANIZED HEALTH CARE EDUCATION/TRAINING PROGRAM

## 2024-02-02 PROCEDURE — 25000242 PHARM REV CODE 250 ALT 637 W/ HCPCS: Performed by: STUDENT IN AN ORGANIZED HEALTH CARE EDUCATION/TRAINING PROGRAM

## 2024-02-02 PROCEDURE — 83735 ASSAY OF MAGNESIUM: CPT

## 2024-02-02 PROCEDURE — 63600175 PHARM REV CODE 636 W HCPCS: Performed by: STUDENT IN AN ORGANIZED HEALTH CARE EDUCATION/TRAINING PROGRAM

## 2024-02-02 PROCEDURE — 83970 ASSAY OF PARATHORMONE: CPT

## 2024-02-02 PROCEDURE — 80053 COMPREHEN METABOLIC PANEL: CPT | Performed by: STUDENT IN AN ORGANIZED HEALTH CARE EDUCATION/TRAINING PROGRAM

## 2024-02-02 PROCEDURE — 97110 THERAPEUTIC EXERCISES: CPT

## 2024-02-02 PROCEDURE — A4216 STERILE WATER/SALINE, 10 ML: HCPCS | Performed by: STUDENT IN AN ORGANIZED HEALTH CARE EDUCATION/TRAINING PROGRAM

## 2024-02-02 PROCEDURE — 99900035 HC TECH TIME PER 15 MIN (STAT)

## 2024-02-02 PROCEDURE — 25000242 PHARM REV CODE 250 ALT 637 W/ HCPCS

## 2024-02-02 PROCEDURE — 84100 ASSAY OF PHOSPHORUS: CPT

## 2024-02-02 RX ORDER — METRONIDAZOLE 500 MG/1
500 TABLET ORAL EVERY 12 HOURS
Qty: 26 TABLET | Refills: 0 | Status: ON HOLD | OUTPATIENT
Start: 2024-02-02 | End: 2024-02-22 | Stop reason: HOSPADM

## 2024-02-02 RX ORDER — VENLAFAXINE 37.5 MG/1
37.5 TABLET ORAL DAILY
Qty: 30 TABLET | Refills: 11 | Status: SHIPPED | OUTPATIENT
Start: 2024-02-03 | End: 2024-04-16 | Stop reason: SDUPTHER

## 2024-02-02 RX ORDER — FLUCONAZOLE 200 MG/1
200 TABLET ORAL NIGHTLY
Qty: 7 TABLET | Refills: 0 | Status: SHIPPED | OUTPATIENT
Start: 2024-02-02 | End: 2024-02-09

## 2024-02-02 RX ORDER — CEFPODOXIME PROXETIL 200 MG/1
200 TABLET, FILM COATED ORAL NIGHTLY
Qty: 13 TABLET | Refills: 0 | Status: ON HOLD | OUTPATIENT
Start: 2024-02-02 | End: 2024-02-22 | Stop reason: HOSPADM

## 2024-02-02 RX ORDER — METOPROLOL TARTRATE 25 MG/1
12.5 TABLET, FILM COATED ORAL 2 TIMES DAILY
Qty: 30 TABLET | Refills: 11 | Status: ON HOLD | OUTPATIENT
Start: 2024-02-02 | End: 2024-02-22 | Stop reason: HOSPADM

## 2024-02-02 RX ORDER — PANTOPRAZOLE SODIUM 40 MG/1
40 TABLET, DELAYED RELEASE ORAL
Qty: 100 TABLET | Refills: 0 | Status: SHIPPED | OUTPATIENT
Start: 2024-02-02 | End: 2024-04-26 | Stop reason: SDUPTHER

## 2024-02-02 RX ORDER — MIDODRINE HYDROCHLORIDE 10 MG/1
10 TABLET ORAL 2 TIMES DAILY WITH MEALS
Qty: 60 TABLET | Refills: 11 | Status: ON HOLD | OUTPATIENT
Start: 2024-02-02 | End: 2024-03-29 | Stop reason: HOSPADM

## 2024-02-02 RX ADMIN — METOPROLOL TARTRATE 12.5 MG: 25 TABLET, FILM COATED ORAL at 08:02

## 2024-02-02 RX ADMIN — PANTOPRAZOLE SODIUM 40 MG: 40 TABLET, DELAYED RELEASE ORAL at 05:02

## 2024-02-02 RX ADMIN — OXYBUTYNIN CHLORIDE 5 MG: 5 TABLET ORAL at 08:02

## 2024-02-02 RX ADMIN — ACETYLCYSTEINE 4 ML: 100 INHALANT RESPIRATORY (INHALATION) at 09:02

## 2024-02-02 RX ADMIN — METRONIDAZOLE 500 MG: 500 TABLET ORAL at 08:02

## 2024-02-02 RX ADMIN — HEPARIN SODIUM 5000 UNITS: 5000 INJECTION INTRAVENOUS; SUBCUTANEOUS at 05:02

## 2024-02-02 RX ADMIN — METOPROLOL TARTRATE 12.5 MG: 25 TABLET, FILM COATED ORAL at 09:02

## 2024-02-02 RX ADMIN — PANTOPRAZOLE SODIUM 40 MG: 40 TABLET, DELAYED RELEASE ORAL at 03:02

## 2024-02-02 RX ADMIN — DRONABINOL 5 MG: 2.5 CAPSULE ORAL at 09:02

## 2024-02-02 RX ADMIN — ACETYLCYSTEINE 4 ML: 100 INHALANT RESPIRATORY (INHALATION) at 08:02

## 2024-02-02 RX ADMIN — OXYBUTYNIN CHLORIDE 5 MG: 5 TABLET ORAL at 09:02

## 2024-02-02 RX ADMIN — DRONABINOL 5 MG: 2.5 CAPSULE ORAL at 08:02

## 2024-02-02 RX ADMIN — HEPARIN SODIUM 5000 UNITS: 5000 INJECTION INTRAVENOUS; SUBCUTANEOUS at 09:02

## 2024-02-02 RX ADMIN — ERYTHROMYCIN ETHYLSUCCINATE 200 MG: 200 SUSPENSION ORAL at 08:02

## 2024-02-02 RX ADMIN — ERYTHROMYCIN ETHYLSUCCINATE 200 MG: 200 SUSPENSION ORAL at 09:02

## 2024-02-02 RX ADMIN — Medication 10 ML: at 06:02

## 2024-02-02 RX ADMIN — MIDODRINE HYDROCHLORIDE 10 MG: 5 TABLET ORAL at 06:02

## 2024-02-02 RX ADMIN — VENLAFAXINE 37.5 MG: 37.5 TABLET ORAL at 08:02

## 2024-02-02 RX ADMIN — HEPARIN SODIUM 1000 UNITS: 1000 INJECTION, SOLUTION INTRAVENOUS; SUBCUTANEOUS at 01:02

## 2024-02-02 RX ADMIN — CEFPODOXIME PROXETIL 200 MG: 200 TABLET, FILM COATED ORAL at 09:02

## 2024-02-02 RX ADMIN — HEPARIN SODIUM 5000 UNITS: 5000 INJECTION INTRAVENOUS; SUBCUTANEOUS at 03:02

## 2024-02-02 RX ADMIN — ONDANSETRON 8 MG: 2 INJECTION INTRAMUSCULAR; INTRAVENOUS at 05:02

## 2024-02-02 RX ADMIN — ERYTHROMYCIN ETHYLSUCCINATE 200 MG: 200 SUSPENSION ORAL at 03:02

## 2024-02-02 RX ADMIN — ERYTHROMYCIN ETHYLSUCCINATE 200 MG: 200 SUSPENSION ORAL at 06:02

## 2024-02-02 RX ADMIN — METRONIDAZOLE 500 MG: 500 TABLET ORAL at 09:02

## 2024-02-02 RX ADMIN — Medication 10 ML: at 05:02

## 2024-02-02 RX ADMIN — OXYBUTYNIN CHLORIDE 5 MG: 5 TABLET ORAL at 03:02

## 2024-02-02 RX ADMIN — ONDANSETRON 8 MG: 2 INJECTION INTRAMUSCULAR; INTRAVENOUS at 10:02

## 2024-02-02 RX ADMIN — ALBUTEROL SULFATE 2.5 MG: 2.5 SOLUTION RESPIRATORY (INHALATION) at 09:02

## 2024-02-02 RX ADMIN — SODIUM CHLORIDE: 9 INJECTION, SOLUTION INTRAVENOUS at 11:02

## 2024-02-02 RX ADMIN — ERYTHROPOIETIN 4000 UNITS: 4000 INJECTION, SOLUTION INTRAVENOUS; SUBCUTANEOUS at 06:02

## 2024-02-02 RX ADMIN — ALBUTEROL SULFATE 2.5 MG: 2.5 SOLUTION RESPIRATORY (INHALATION) at 08:02

## 2024-02-02 RX ADMIN — LIDOCAINE 5% 1 PATCH: 700 PATCH TOPICAL at 08:02

## 2024-02-02 RX ADMIN — FLUCONAZOLE 200 MG: 200 TABLET ORAL at 09:02

## 2024-02-02 NOTE — PLAN OF CARE
Problem: Adult Inpatient Plan of Care  Goal: Plan of Care Review  Outcome: Ongoing, Progressing  Goal: Patient-Specific Goal (Individualized)  Outcome: Ongoing, Progressing  Goal: Absence of Hospital-Acquired Illness or Injury  Outcome: Ongoing, Progressing  Goal: Optimal Comfort and Wellbeing  Outcome: Ongoing, Progressing  Goal: Readiness for Transition of Care  Outcome: Ongoing, Progressing   [facility] Plan of Care Note  Dx Malignant carcinoid tumor of lung    Shift Events HD done- 2L removed; complained of nausea x2 after eating/drinking Novasource- relieved with Zofran, no emesis    Goals of Care: VS and lab WDL, optimized nutrition; ambulating more often    Neuro: AAOx4    Vital Signs: stable    Respiratory: RA    Diet: 2000 kary + Novasource renal    Is patient tolerating current diet? Poor- had a few spoons of chicken- had 1.5 can of Novasource    GTTS: none    Urine Output/Bowel Movement: romelia; no BM    Drains/Tubes/Tube Feeds (include total output/shift): none    Lines: midline IV, HD catheter, AV fistula      Accuchecks:ac/hs    Skin: right upper back incision with Dermabond RICHARD CDI    Fall Risk Score: 17    Activity level? X2 assist- bed to chair    Any scheduled procedures? none    Any safety concerns? Fall risk precaution    Other: edema on arms and feet got better

## 2024-02-02 NOTE — TELEPHONE ENCOUNTER
"----- Message from Ena Garg sent at 2024  8:25 AM CST -----    ----- Message -----  From: Nadia Herring MD  Sent: 2024  10:23 AM CST  To: [facility]    Procedure: EGD    Diagnosis: Peptic ulcer disease    Procedure Timin weeks    #If within 4 weeks selected, please nevin as high priority#    #If greater than 12 weeks, please select "5-12 weeks" and delay sending until 2 months prior to requested date#     Provider: Any endoscopist    Location: No Preference    Additional Scheduling Information: Blood thinners    Prep Specifications:N/A    Have you attached a patient to this message: yes        "

## 2024-02-02 NOTE — SUBJECTIVE & OBJECTIVE
Interval History:   NAEO, but restless  No melena  iHD today    Medications:  Continuous Infusions:  Scheduled Meds:   sodium chloride 0.9%   Intravenous Once    acetylcysteine 100 mg/ml (10%)  4 mL Nebulization TID WAKE    cefpodoxime  200 mg Oral QHS    droNABinol  5 mg Oral BID    epoetin noble (PROCRIT) injection  4,000 Units Intravenous Every Mon, Wed, Fri    erythromycin ethylsuccinate  200 mg Oral QID (WM & HS)    fluconazole  200 mg Oral QHS    heparin (porcine)  5,000 Units Subcutaneous Q8H    LIDOcaine  1 patch Transdermal Q24H    metoprolol tartrate  12.5 mg Oral BID    metroNIDAZOLE  500 mg Oral Q12H    midodrine  10 mg Oral BID WM    oxybutynin  5 mg Oral TID    pantoprazole  40 mg Oral BID AC    senna-docusate 8.6-50 mg  1 tablet Oral Daily    sodium chloride 0.9%  10 mL Intravenous Q6H    venlafaxine  37.5 mg Oral Daily     PRN Meds:sodium chloride 0.9%, acetaminophen, albuterol sulfate, bisacodyL, dextrose 10%, dextrose 10%, glucagon (human recombinant), glucose, glucose, heparin (porcine), heparin (porcine), hydrALAZINE, insulin aspart U-100, midodrine, ondansetron, sodium chloride 0.9%, Flushing PICC/Midline Protocol **AND** sodium chloride 0.9% **AND** sodium chloride 0.9%     Review of patient's allergies indicates:   Allergen Reactions    Crestor [rosuvastatin] Swelling    Gluten protein      Objective:     Vital Signs (Most Recent):  Temp: 97.9 °F (36.6 °C) (02/02/24 0706)  Pulse: 66 (02/02/24 0747)  Resp: 20 (02/02/24 0706)  BP: (!) 144/84 (02/02/24 0706)  SpO2: 99 % (02/02/24 0706) Vital Signs (24h Range):  Temp:  [97.5 °F (36.4 °C)-98 °F (36.7 °C)] 97.9 °F (36.6 °C)  Pulse:  [66-84] 66  Resp:  [16-20] 20  SpO2:  [94 %-100 %] 99 %  BP: (113-180)/(56-84) 144/84     Weight: 67.6 kg (149 lb)  Body mass index is 24.79 kg/m².    Intake/Output - Last 3 Shifts         01/31 0700 02/01 0659 02/01 0700 02/02 0659 02/02 0700  02/03 0659    P.O.       I.V. (mL/kg) 347.7 (5.1) 10 (0.1)     Other 300       Total Intake(mL/kg) 647.7 (9.6) 10 (0.1)     Urine (mL/kg/hr)       Other 2650      Stool       Total Output 2650      Net -2002.3 +10            Urine Occurrence 1 x 0 x     Stool Occurrence 1 x 0 x              Physical Exam  Vitals and nursing note reviewed.   Constitutional:       Appearance: Normal appearance.   HENT:      Head: Normocephalic and atraumatic.      Nose: Nose normal.   Cardiovascular:      Rate and Rhythm: Normal rate and regular rhythm.      Pulses: Normal pulses.   Pulmonary:      Effort: Pulmonary effort is normal. No respiratory distress.      Comments: Right posterior chest incision c/d/I  Right chest permacath    Abdominal:      General: Abdomen is flat. There is no distension.      Palpations: Abdomen is soft.      Tenderness: There is no abdominal tenderness.      Comments: Soft, nontender abdomen   Musculoskeletal:      Right lower leg: No edema.      Left lower leg: No edema.   Skin:     General: Skin is warm and dry.      Comments: Groin soft, dressings c/d/i   Neurological:      General: No focal deficit present.      Mental Status: She is alert.          Significant Labs:  I have reviewed all pertinent lab results within the past 24 hours.  CBC:   Recent Labs   Lab 02/02/24  0354   WBC 5.75   RBC 3.14*   HGB 9.3*   HCT 30.5*   *   MCV 97   MCH 29.6   MCHC 30.5*         Significant Diagnostics:  I have reviewed all pertinent imaging results/findings within the past 24 hours.

## 2024-02-02 NOTE — PLAN OF CARE
02/02/24 1145   Discharge Reassessment   Assessment Type Discharge Planning Reassessment   Did the patient's condition or plan change since previous assessment? No   Discharge Plan discussed with: Spouse/sig other   Communicated CAMPBELL with patient/caregiver Yes   Discharge Plan A Skilled Nursing Facility   DME Needed Upon Discharge  none   Transition of Care Barriers None   Post-Acute Status   Post-Acute Authorization Placement   Post-Acute Placement Status Set-up Complete/Auth obtained   Hospital Resources/Appts/Education Provided Provided patient/caregiver with written discharge plan information   Patient choice form signed by patient/caregiver List with quality metrics by geographic area provided   Discharge Delays None known at this time       Pending  completing paperwork at [facility].     Yesenia Burciaga LCSW  Case Management/[facility]  [phone]

## 2024-02-02 NOTE — ASSESSMENT & PLAN NOTE
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 On HD ~ 1 year. Last HD prior to presentation 1/1/24. Pt has NELY AVF placed on 10/23 that has not been cleared by vascular. Currently using RIJ TDC.     During her admission: stepped up to ICU for Afib with RVR. Was started on BB, amio and digoxin. However 1/12/24, had multiple episodes of eulogio with few asystole requiring chest compressions. Digoxin level elevated and dig-meaghan was given with resulting decrease in levels.     Nephrology History  iHD Schedule: TTS   Unit/MD: [facility] Hinds/ Dr. Vyas  Duration: 3.5 hours   EDW: 58 kg   Access: RIJ TDC  Residual Renal Function: minimal     Assessment:     -Plan for HD today, for clearance and volume management.   -Strict ins and outs  -Avoid nephrotoxic agents if possible and renally dose medications  -Avoid drastic hemodynamic changes if possible

## 2024-02-02 NOTE — NURSING TRANSFER
Nursing Transfer Note      2/2/2024   11:10 AM    Nurse giving handoff:Dontae  Nurse receiving handoff:Dialysis nurse    Reason patient is being transferred: HD    Transfer From: 1051 to dialysis    Transfer via stretcher    Transfer with 2LO2    Transported by transporter        Telemetry: Rate 77-  NSR  Order for Tele Monitor? Yes      Medicines sent: no    Any special needs or follow-up needed: VS rechecked    Patient belongings transferred with patient: No    Chart send with patient: Yes    Notified: spouse  PT AAOx4, on 2L of O2, VS stable. Pt complained of nausea - Zofran given at 1056, no emesis

## 2024-02-02 NOTE — SUBJECTIVE & OBJECTIVE
Interval History: plan for HD today  Medications:  Continuous Infusions:  Scheduled Meds:   sodium chloride 0.9%   Intravenous Once    acetylcysteine 100 mg/ml (10%)  4 mL Nebulization TID WAKE    cefpodoxime  200 mg Oral QHS    droNABinol  5 mg Oral BID    epoetin noble (PROCRIT) injection  4,000 Units Intravenous Every Mon, Wed, Fri    erythromycin ethylsuccinate  200 mg Oral QID (WM & HS)    fluconazole  200 mg Oral QHS    heparin (porcine)  5,000 Units Subcutaneous Q8H    LIDOcaine  1 patch Transdermal Q24H    metoprolol tartrate  12.5 mg Oral BID    metroNIDAZOLE  500 mg Oral Q12H    midodrine  10 mg Oral BID WM    oxybutynin  5 mg Oral TID    pantoprazole  40 mg Oral BID AC    senna-docusate 8.6-50 mg  1 tablet Oral Daily    sodium chloride 0.9%  10 mL Intravenous Q6H    venlafaxine  37.5 mg Oral Daily     PRN Meds:sodium chloride 0.9%, acetaminophen, albuterol sulfate, bisacodyL, dextrose 10%, dextrose 10%, glucagon (human recombinant), glucose, glucose, heparin (porcine), heparin (porcine), hydrALAZINE, insulin aspart U-100, midodrine, ondansetron, sodium chloride 0.9%, Flushing PICC/Midline Protocol **AND** sodium chloride 0.9% **AND** sodium chloride 0.9%     Review of patient's allergies indicates:   Allergen Reactions    Crestor [rosuvastatin] Swelling    Gluten protein      Objective:     Vital Signs (Most Recent):  Temp: 97.9 °F (36.6 °C) (02/02/24 0706)  Pulse: 76 (02/02/24 1130)  Resp: 20 (02/02/24 0947)  BP: (!) 163/74 (02/02/24 1130)  SpO2: 95 % (02/02/24 1110) Vital Signs (24h Range):  Temp:  [97.5 °F (36.4 °C)-98 °F (36.7 °C)] 97.9 °F (36.6 °C)  Pulse:  [66-84] 76  Resp:  [16-20] 20  SpO2:  [94 %-100 %] 95 %  BP: (114-170)/(59-84) 163/74     Weight: 67.6 kg (149 lb)  Body mass index is 24.79 kg/m².    Intake/Output - Last 3 Shifts         01/31 0700 02/01 0659 02/01 0700 02/02 0659 02/02 0700 02/03 0659    P.O.   360    I.V. (mL/kg) 347.7 (5.1) 10 (0.1)     Other 300      Total Intake(mL/kg)  647.7 (9.6) 10 (0.1) 360 (5.3)    Urine (mL/kg/hr)       Other 2650      Stool       Total Output 2650      Net -2002.3 +10 +360           Urine Occurrence 1 x 0 x 0 x    Stool Occurrence 1 x 0 x 0 x             Physical Exam  Vitals and nursing note reviewed.   Constitutional:       Appearance: Normal appearance.   HENT:      Head: Normocephalic and atraumatic.      Nose: Nose normal.   Cardiovascular:      Rate and Rhythm: Normal rate and regular rhythm.      Pulses: Normal pulses.   Pulmonary:      Effort: Pulmonary effort is normal. No respiratory distress.      Comments: Right posterior chest incision c/d/I  Right chest permacath    Abdominal:      General: Abdomen is flat. There is no distension.      Palpations: Abdomen is soft.      Tenderness: There is no abdominal tenderness.      Comments: Soft, nontender abdomen   Musculoskeletal:      Right lower leg: Edema present.      Left lower leg: Edema present.   Skin:     General: Skin is warm and dry.      Coloration: Skin is pale.      Comments: Groin soft, dressings c/d/i   Neurological:      General: No focal deficit present.      Mental Status: She is alert.          Significant Labs:  I have reviewed all pertinent lab results within the past 24 hours.  CBC:   Recent Labs   Lab 02/02/24  0354   WBC 5.75   RBC 3.14*   HGB 9.3*   HCT 30.5*   *   MCV 97   MCH 29.6   MCHC 30.5*       CMP:   Recent Labs   Lab 02/01/24  0447   GLU 93   CALCIUM 8.2*   ALBUMIN 2.0*   PROT 5.1*      K 3.6   CO2 23      BUN 22   CREATININE 3.1*   ALKPHOS 95   ALT 6*   AST 13   BILITOT 0.5         Significant Diagnostics:  I have reviewed all pertinent imaging results/findings within the past 24 hours.

## 2024-02-02 NOTE — PT/OT/SLP PROGRESS
Occupational Therapy   Treatment    Name: Lily Green  MRN: [id number]  Admitting Diagnosis:  Malignant carcinoid tumor of bronchus  7 Days Post-Op    Recommendations:     Discharge Recommendations: Moderate Intensity Therapy  Discharge Equipment Recommendations:  to be determined by next level of care  Barriers to discharge:  None    Assessment:     Lily Green is a 73 y.o. female with a medical diagnosis of Malignant carcinoid tumor of bronchus.  She presents with Fatigue and SOB during tx. Performance deficits affecting function are weakness, impaired endurance, impaired self care skills, impaired functional mobility, gait instability, impaired cardiopulmonary response to activity, impaired balance, decreased safety awareness. Pt motivated to participate, pt limited activity tolerance 2/2 SOB    Rehab Prognosis:  Good; patient would benefit from acute skilled OT services to address these deficits and reach maximum level of function.       Plan:     Patient to be seen 4 x/week to address the above listed problems via self-care/home management, therapeutic activities, therapeutic exercises, neuromuscular re-education  Plan of Care Expires: 02/02/24  Plan of Care Reviewed with: patient, caregiver    Subjective     Chief Complaint: SOB  Patient/Family Comments/goals: return home  Pain/Comfort:  Pain Rating 1: 0/10    Objective:     Communicated with: Juice prior to session.  Patient found  up in chair c/ oxygen mask c/nebulizer  with oxygen, telemetry upon OT entry to room.    General Precautions: Standard, fall    Orthopedic Precautions:N/A  Braces: N/A  Respiratory Status:  oxygen mask c/ nebulizer       Occupational Performance:     Bed Mobility:    Pt up in chair     Therex:  Pt performed the following UE exercises whiles seated in chair with breaks in between sets:  2 sets/10 reps of bicep curls c/ 1 set using towel roll for resistance  2 sets/10 reps of fwd shld flex c/ 1 set using towel  roll for resistance    [facility] 6 Click ADL: 13    Treatment & Education:  Pt educated on role and purpose of therapy  Pt educated on goal setting  Pt educated on benefits of OOB activity  Pt educated on self advocacy   Pt educated on HEP and pacing self    Patient left up in chair with all lines intact, call button in reach, and nsg notified    GOALS:   Multidisciplinary Problems       Occupational Therapy Goals          Problem: Occupational Therapy    Goal Priority Disciplines Outcome Interventions   Occupational Therapy Goal     OT, PT/OT Ongoing, Progressing    Description: Goals to be met by: 02/2/24     Patient will increase functional independence with ADLs by performing:    UE Dressing with Supervision.  Grooming while standing at sink with Modified [place].  Toileting from toilet with Modified [place] for hygiene and clothing management.   Supine to sit with Supervision.  Toilet transfer to toilet with Supervision.                         Time Tracking:     OT Date of Treatment: 02/02/24  OT Start Time: 0955  OT Stop Time: 1005  OT Total Time (min): 10 min    Billable Minutes:Therapeutic Exercise 10    OT/RICHARD: OT          2/2/2024

## 2024-02-02 NOTE — ASSESSMENT & PLAN NOTE
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 Stepped up to the ICU 1/5 for afib RVR which was refractory to medical management and c/b development of tachy-eulogio syndrome now s/p micra placement 1/16 for tachy-eulogio syndrome in the setting of RVR. Did have brief SBO type picture 1/7 which resolved with conservative management.     - s/p R thoracotomy with R lower lobectomy for carcinoid w/ MLND; IPV injury requiring repair. CT removed post operatively.  - Refractory afib RVR requiring ICU step up 1/05, s/p chemical cardioversion initially with recurrence c/b developing tachy-eulogio syndrome    - cards and EP following, appreciate assistance            - Plan for holter monitor 30 days after discharge followed by EP outpatient follow up    - s/p TVP placement and now micra implantation 1/16    - Heparin gtt resumed 1/22; will need to transition to eliquis likely following CT removal; held 1/23 2/2 GI bleed     - Heparin ppx 1/29  - s/p R chest tube placement with IR 1/17, L chest tube placement 1/18; L CT removed 1/25; Last R CT out 1/28  - Pulmonary toilet: IS, Acapella, Mucomyst daily; CPT ordered  - Infectious work up 2/2 leukocytosis. She remains afebrile. Effusions and compressive atelectasis also likely contributing to leukocytosis.Trended up to 30k 1/16, now downtrending. Zosyn 1/11-1/15; Meropenam 1/16 - 1/20; Plan for Cefpodoxime and metronidazole to start 1/21 per ID. 4 week abx plan for empiric treatment of presumed parapneumonic effusion - end date 2/15/2024.    - ID following, appreciate assistance. Reconsult following thora results.   - Bcx 1/11: Staph, probable contaminant  - Bcx1/12: No growth  - Rapid blood PCR: staph epi  - Sputum Cx 1/11: klebsiella & e. Coli  - R pleural fluid Cx 1/17: No growth (fungal  pending still, neg KOH)  - SBO type episode 1/07 requiring NGT decompression, which resolved with conservative management   - GI bleed 1/23/2024 - heparin held, 2uPRBC, 1uFFP, heparin reversed. GI consulted                       - EGD 1/24 - could not visualize stomach much 2/2 food residue; EGD aborted                       - 2u PRBC overnight 1/25 for Hgb 6.3, slow downtrend. Repeat EGD 1/26 demonstrates non-bleeding ulcers    - Continue protonix. GI signed off. Repeat scope in 8 weeks. F/u H pylori   - Diabetic diet diet, novasource renal BID; marinol for appetite.   - Bowel regimen, PPI  - Nephrology following, appreciate assistance. CRRT 1/17 overnight. HD trial 1/19 passed but requiring midodrine, 1.5 L LR, metoprolol for RVR. HD 1/26           - LUE AVF created 10.2023, US 1/22/2023 with good flow metrics, okay to use per vascular   - Oxybutinin for bladder spasms   - MM pain control as needed  - Continue PT/OT, mobilization, likely post hospital placement    Dispo: Pending appropriate discharge acceptance.

## 2024-02-02 NOTE — CARE UPDATE
-Glucose Goal 140-180    -A1C:   Hemoglobin A1C   Date Value Ref Range Status   11/15/2023 4.9 4.0 - 5.6 % Final     Comment:     ADA Screening Guidelines:  5.7-6.4%  Consistent with prediabetes  >or=6.5%  Consistent with diabetes    High levels of fetal hemoglobin interfere with the HbA1C  assay. Heterozygous hemoglobin variants (HbS, HgC, etc)do  not significantly interfere with this assay.   However, presence of multiple variants may affect accuracy.     10/23/2019 6.8 % Final         -HOME REGIMEN:   LDC SSI   Blood sugar 150 to 200, + 1 unit  Blood sugar 201 to 250, + 2 units  Blood sugar 251 to 300, + 3 units  Blood sugar 301 to 350, + 4 units   Blood sugar greater than 350, + 5 units      -GLUCOSE TREND FOR THE PAST 24HRS:   Recent Labs   Lab 01/31/24  1957 02/01/24  0723 02/01/24  1036 02/01/24  1655 02/01/24  2008 02/02/24  0708   POCTGLUCOSE 157* 129* 152* 137* 103 86           -NO HYPOGYCEMIAS NOTED     - Diet  Diet diabetic 2000 Calorie    -TOLERATING 0-25 % OF PO DIET. Insulin needs continue to vary as patient's nutritional status is inconsistent. Will continue to titrate insulin based on current needs.       Plan:   - Continue Novolog Mod dose correction with ISF 25 starting at 150 prn   - POCT Glucose before meals and at bedtime   - Hypoglycemia protocol in place      ** Please notify Endocrine for any change and/or advance in diet**  ** Please call Endocrine for any BG related issues **     Discharge Planning:   TBD. Please notify endocrinology prior to discharge.

## 2024-02-02 NOTE — PROGRESS NOTES
HD TX completed.  TX time 3 hrs.  Net fluid removed 2 liters.  VSS. Tolerated HD.  Saline flushed, hep locked and capped..  Report given to primary nurse.\  Returned to room via stretcher.

## 2024-02-02 NOTE — PROGRESS NOTES
GUEVARA contacted [facility] for an update regarding pt's dialysis referral. Per , clinic manager currently isn't in. AA informed she will contact SW back after speaking with clinic manager regarding pt's referral.    SW to continue to follow with updates.      UPDATE 10:10AM: SW received notification via [facility] online portal, pt accepted for temporary transfer to [facility] for outpt dialysis.    Please see below for pt's outpt dialysis information:    -[facility]  -[address] [place] [address] A[place] [address]  [phone]  -Schedule: [facility] 1:30PM  -Anticipated start date: Monday, 02/05/2024  **Pt will need to arrive 30 minutes early to initial appointment to sign consents**    Inpt treatment team updated via secure chat.    GUEVARA will continue to follow for pt's discharge.    Kathy Agustin LMSW  [facility]  [phone]

## 2024-02-02 NOTE — PROGRESS NOTES
Patient arrived in a stretcher to dialysis unit.   Report received from Dontae BELL  VS's per dialysis Flowsheet.     Hemodialysis initiated using the following:     Dialysis Access: R IJ CVC. Tolerated well, flows good. UF goal 2 L as tolerated        Will Maintain telemetry and blood pressure monitoring throughout treatment.  Refer to dialysis flowsheet and MAR for details.

## 2024-02-02 NOTE — PROGRESS NOTES
[facility]  General Surgery  Progress Note    Subjective:     History of Present Illness:  No notes on file    Post-Op Info:  Procedure(s) (LRB):  EGD (ESOPHAGOGASTRODUODENOSCOPY) (N/A)   7 Days Post-Op     Interval History:   NAEO, but restless  No melena  iHD today    Medications:  Continuous Infusions:  Scheduled Meds:   sodium chloride 0.9%   Intravenous Once    acetylcysteine 100 mg/ml (10%)  4 mL Nebulization TID WAKE    cefpodoxime  200 mg Oral QHS    droNABinol  5 mg Oral BID    epoetin noble (PROCRIT) injection  4,000 Units Intravenous Every Mon, Wed, Fri    erythromycin ethylsuccinate  200 mg Oral QID (WM & HS)    fluconazole  200 mg Oral QHS    heparin (porcine)  5,000 Units Subcutaneous Q8H    LIDOcaine  1 patch Transdermal Q24H    metoprolol tartrate  12.5 mg Oral BID    metroNIDAZOLE  500 mg Oral Q12H    midodrine  10 mg Oral BID WM    oxybutynin  5 mg Oral TID    pantoprazole  40 mg Oral BID AC    senna-docusate 8.6-50 mg  1 tablet Oral Daily    sodium chloride 0.9%  10 mL Intravenous Q6H    venlafaxine  37.5 mg Oral Daily     PRN Meds:sodium chloride 0.9%, acetaminophen, albuterol sulfate, bisacodyL, dextrose 10%, dextrose 10%, glucagon (human recombinant), glucose, glucose, heparin (porcine), heparin (porcine), hydrALAZINE, insulin aspart U-100, midodrine, ondansetron, sodium chloride 0.9%, Flushing PICC/Midline Protocol **AND** sodium chloride 0.9% **AND** sodium chloride 0.9%     Review of patient's allergies indicates:   Allergen Reactions    Crestor [rosuvastatin] Swelling    Gluten protein      Objective:     Vital Signs (Most Recent):  Temp: 97.9 °F (36.6 °C) (02/02/24 0706)  Pulse: 66 (02/02/24 0747)  Resp: 20 (02/02/24 0706)  BP: (!) 144/84 (02/02/24 0706)  SpO2: 99 % (02/02/24 0706) Vital Signs (24h Range):  Temp:  [97.5 °F (36.4 °C)-98 °F (36.7 °C)] 97.9 °F (36.6 °C)  Pulse:  [66-84] 66  Resp:  [16-20] 20  SpO2:  [94 %-100 %] 99 %  BP: (113-180)/(56-84) 144/84     Weight: 67.6 kg (149  lb)  Body mass index is 24.79 kg/m².    Intake/Output - Last 3 Shifts         01/31 0700  02/01 0659 02/01 0700  02/02 0659 02/02 0700  02/03 0659    P.O.       I.V. (mL/kg) 347.7 (5.1) 10 (0.1)     Other 300      Total Intake(mL/kg) 647.7 (9.6) 10 (0.1)     Urine (mL/kg/hr)       Other 2650      Stool       Total Output 2650      Net -2002.3 +10            Urine Occurrence 1 x 0 x     Stool Occurrence 1 x 0 x              Physical Exam  Vitals and nursing note reviewed.   Constitutional:       Appearance: Normal appearance.   HENT:      Head: Normocephalic and atraumatic.      Nose: Nose normal.   Cardiovascular:      Rate and Rhythm: Normal rate and regular rhythm.      Pulses: Normal pulses.   Pulmonary:      Effort: Pulmonary effort is normal. No respiratory distress.      Comments: Right posterior chest incision c/d/I  Right chest permacath    Abdominal:      General: Abdomen is flat. There is no distension.      Palpations: Abdomen is soft.      Tenderness: There is no abdominal tenderness.      Comments: Soft, nontender abdomen   Musculoskeletal:      Right lower leg: No edema.      Left lower leg: No edema.   Skin:     General: Skin is warm and dry.      Comments: Groin soft, dressings c/d/i   Neurological:      General: No focal deficit present.      Mental Status: She is alert.          Significant Labs:  I have reviewed all pertinent lab results within the past 24 hours.  CBC:   Recent Labs   Lab 02/02/24  0354   WBC 5.75   RBC 3.14*   HGB 9.3*   HCT 30.5*   *   MCV 97   MCH 29.6   MCHC 30.5*         Significant Diagnostics:  I have reviewed all pertinent imaging results/findings within the past 24 hours.  Assessment/Plan:     * Malignant carcinoid tumor of bronchus  72 yo female with ESRD (TTS), DDD, benign essential tremor, meniere's disease, HTN, HLD, DM2, GERD, Celiac's, IBS and carcinoid tumor of the right middle lobe s/p thoracotomy and resection with mediastinal lymph node dissection  1/2/2023. Stepped up to the ICU 1/5 for afib RVR which was refractory to medical management and c/b development of tachy-eulogio syndrome now s/p micra placement 1/16 for tachy-eulogio syndrome in the setting of RVR. Did have brief SBO type picture 1/7 which resolved with conservative management.     - s/p R thoracotomy with R lower lobectomy for carcinoid w/ MLND; IPV injury requiring repair. CT removed post operatively.  - Refractory afib RVR requiring ICU step up 1/05, s/p chemical cardioversion initially with recurrence c/b developing tachy-eulogio syndrome    - cards and EP following, appreciate assistance            - Plan for holter monitor 30 days after discharge followed by EP outpatient follow up    - s/p TVP placement and now micra implantation 1/16    - Heparin gtt resumed 1/22; will need to transition to eliquis likely following CT removal; held 1/23 2/2 GI bleed     - Heparin ppx 1/29  - s/p R chest tube placement with IR 1/17, L chest tube placement 1/18; L CT removed 1/25; Last R CT out 1/28  - Pulmonary toilet: IS, Acapella, Mucomyst daily; CPT ordered  - Infectious work up 2/2 leukocytosis. She remains afebrile. Effusions and compressive atelectasis also likely contributing to leukocytosis.Trended up to 30k 1/16, now downtrending. Zosyn 1/11-1/15; Meropenam 1/16 - 1/20; Plan for Cefpodoxime and metronidazole to start 1/21 per ID. 4 week abx plan for empiric treatment of presumed parapneumonic effusion - end date 2/15/2024.    - ID following, appreciate assistance. Reconsult following thora results.   - Bcx 1/11: Staph, probable contaminant  - Bcx1/12: No growth  - Rapid blood PCR: staph epi  - Sputum Cx 1/11: klebsiella & e. Coli  - R pleural fluid Cx 1/17: No growth (fungal pending still, neg KOH)  - SBO type episode 1/07 requiring NGT decompression, which resolved with conservative management   - GI bleed 1/23/2024 - heparin held, 2uPRBC, 1uFFP, heparin reversed. GI consulted                       -  EGD 1/24 - could not visualize stomach much 2/2 food residue; EGD aborted                       - 2u PRBC overnight 1/25 for Hgb 6.3, slow downtrend. Repeat EGD 1/26 demonstrates non-bleeding ulcers    - Continue protonix. GI signed off. Repeat scope in 8 weeks. F/u H pylori   - Diabetic diet diet, novasource renal BID; marinol for appetite.   - Bowel regimen, PPI  - Nephrology following, appreciate assistance. CRRT 1/17 overnight. HD trial 1/19 passed but requiring midodrine, 1.5 L LR, metoprolol for RVR. HD 1/26           - LUE AVF created 10.2023, US 1/22/2023 with good flow metrics, okay to use per vascular   - Oxybutinin for bladder spasms   - MM pain control as needed  - Continue PT/OT, mobilization, likely post hospital placement    Dispo: Pending appropriate discharge acceptance.        Mike Lam MD  General Surgery  [facility]

## 2024-02-02 NOTE — PROGRESS NOTES
[facility]  Nephrology  Progress Note    Patient Name: Lily Green  MRN: [id number]  Admission Date: 1/2/2024  Hospital Length of Stay: 31 days  Attending Provider: Tan Thomson MD   Primary Care Physician: Pavel Calixto MD  Principal Problem:Malignant carcinoid tumor of bronchus    Subjective:     HPI: Patient is a 73 y.o. 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 S/p thoracotomy. Last HD prior to presentation 1/1/24. Nephrology consulted for ESRD on HD.        Interval History: plan for HD today  Medications:  Continuous Infusions:  Scheduled Meds:   sodium chloride 0.9%   Intravenous Once    acetylcysteine 100 mg/ml (10%)  4 mL Nebulization TID WAKE    cefpodoxime  200 mg Oral QHS    droNABinol  5 mg Oral BID    epoetin noble (PROCRIT) injection  4,000 Units Intravenous Every Mon, Wed, Fri    erythromycin ethylsuccinate  200 mg Oral QID (WM & HS)    fluconazole  200 mg Oral QHS    heparin (porcine)  5,000 Units Subcutaneous Q8H    LIDOcaine  1 patch Transdermal Q24H    metoprolol tartrate  12.5 mg Oral BID    metroNIDAZOLE  500 mg Oral Q12H    midodrine  10 mg Oral BID WM    oxybutynin  5 mg Oral TID    pantoprazole  40 mg Oral BID AC    senna-docusate 8.6-50 mg  1 tablet Oral Daily    sodium chloride 0.9%  10 mL Intravenous Q6H    venlafaxine  37.5 mg Oral Daily     PRN Meds:sodium chloride 0.9%, acetaminophen, albuterol sulfate, bisacodyL, dextrose 10%, dextrose 10%, glucagon (human recombinant), glucose, glucose, heparin (porcine), heparin (porcine), hydrALAZINE, insulin aspart U-100, midodrine, ondansetron, sodium chloride 0.9%, Flushing PICC/Midline Protocol **AND** sodium chloride 0.9% **AND** sodium chloride 0.9%     Review of patient's allergies indicates:   Allergen Reactions    Crestor [rosuvastatin] Swelling    Gluten protein      Objective:     Vital Signs  (Most Recent):  Temp: 97.9 °F (36.6 °C) (02/02/24 0706)  Pulse: 76 (02/02/24 1130)  Resp: 20 (02/02/24 0947)  BP: (!) 163/74 (02/02/24 1130)  SpO2: 95 % (02/02/24 1110) Vital Signs (24h Range):  Temp:  [97.5 °F (36.4 °C)-98 °F (36.7 °C)] 97.9 °F (36.6 °C)  Pulse:  [66-84] 76  Resp:  [16-20] 20  SpO2:  [94 %-100 %] 95 %  BP: (114-170)/(59-84) 163/74     Weight: 67.6 kg (149 lb)  Body mass index is 24.79 kg/m².    Intake/Output - Last 3 Shifts         01/31 0700  02/01 0659 02/01 0700  02/02 0659 02/02 0700  02/03 0659    P.O.   360    I.V. (mL/kg) 347.7 (5.1) 10 (0.1)     Other 300      Total Intake(mL/kg) 647.7 (9.6) 10 (0.1) 360 (5.3)    Urine (mL/kg/hr)       Other 2650      Stool       Total Output 2650      Net -2002.3 +10 +360           Urine Occurrence 1 x 0 x 0 x    Stool Occurrence 1 x 0 x 0 x            Physical Exam  Vitals and nursing note reviewed.   Constitutional:       Appearance: Normal appearance.   HENT:      Head: Normocephalic and atraumatic.      Nose: Nose normal.   Cardiovascular:      Rate and Rhythm: Normal rate and regular rhythm.      Pulses: Normal pulses.   Pulmonary:      Effort: Pulmonary effort is normal. No respiratory distress.      Comments: Right posterior chest incision c/d/I  Right chest permacath    Abdominal:      General: Abdomen is flat. There is no distension.      Palpations: Abdomen is soft.      Tenderness: There is no abdominal tenderness.      Comments: Soft, nontender abdomen   Musculoskeletal:      Right lower leg: Edema present.      Left lower leg: Edema present.   Skin:     General: Skin is warm and dry.      Coloration: Skin is pale.      Comments: Groin soft, dressings c/d/i   Neurological:      General: No focal deficit present.      Mental Status: She is alert.          Significant Labs:  I have reviewed all pertinent lab results within the past 24 hours.  CBC:   Recent Labs   Lab 02/02/24  0354   WBC 5.75   RBC 3.14*   HGB 9.3*   HCT 30.5*   *   MCV 97    MCH 29.6   MCHC 30.5*       CMP:   Recent Labs   Lab 02/01/24  0447   GLU 93   CALCIUM 8.2*   ALBUMIN 2.0*   PROT 5.1*      K 3.6   CO2 23      BUN 22   CREATININE 3.1*   ALKPHOS 95   ALT 6*   AST 13   BILITOT 0.5         Significant Diagnostics:  I have reviewed all pertinent imaging results/findings within the past 24 hours.  Assessment/Plan:     Cardiac/Vascular  Atrial fibrillation  -management per primary     Renal/  ESRD (end stage renal disease)  S/p R thoracotomy due to RML carcinoid tumor. On HD ~ 1 year. Last HD prior to presentation 1/1/24. Pt has NELY AVF placed on 10/23 that has not been cleared by vascular. Currently using RIJ TDC.     During her admission: stepped up to ICU for Afib with RVR. Was started on BB, amio and digoxin. However 1/12/24, had multiple episodes of eulogio with few asystole requiring chest compressions. Digoxin level elevated and dig-meaghan was given with resulting decrease in levels.     Nephrology History  iHD Schedule: TTS   Unit/MD: Timbo Hinds/ Dr. Vyas  Duration: 3.5 hours   EDW: 58 kg   Access: RIJ TDC  Residual Renal Function: minimal     Assessment:     -Plan for HD today, for clearance and volume management.   -Strict ins and outs  -Avoid nephrotoxic agents if possible and renally dose medications  -Avoid drastic hemodynamic changes if possible      Oncology  * Malignant carcinoid tumor of bronchus  -management per primary         Thank you for your consult. I will follow-up with patient. Please contact us if you have any additional questions.    Abdi Alexis MD  Nephrology  [facility]

## 2024-02-02 NOTE — PT/OT/SLP PROGRESS
Physical Therapy      Patient Name:  Lily Green   MRN:  [id number]    1:46 pm  Patient not seen today secondary to Dialysis (Pt JAMIN away for HD). Will follow-up on next scheduled visit.

## 2024-02-02 NOTE — PLAN OF CARE
NURSING HOME ORDERS    02/06/2024  [facility]  DWAIN GÓMEZY - GISSU  [address]  [facility] [address]  Dept: [phone]  Loc: [phone]     Admit to Nursing Home:  [facility]     Diagnoses:  Active Hospital Problems    Diagnosis  POA    *Malignant carcinoid tumor of bronchus [C7A.090]  Yes    Chronic kidney disease-mineral and bone disorder [N18.9, E83.9, M89.9]  Unknown    GIB (gastrointestinal bleeding) [K92.2]  No    Syncope [R55]  No    Carcinoid tumor of lung [D3A.090]  Yes    Parapneumonic effusion [J18.9, J91.8]  Yes    Bradycardia [R00.1]  No    Anticoagulated on heparin [Z79.01]  Not Applicable    Anuria [R34]  Yes    A-V fistula [I77.0]  Yes    Atrial fibrillation [I48.91]  No    ESRD (end stage renal disease) [N18.6]  Yes    Anemia in ESRD (end-stage renal disease) [N18.6, D63.1]  Yes    On supplemental oxygen by nasal cannula [Z78.9]  Yes    Type 2 diabetes mellitus with diabetic polyneuropathy, without long-term current use of insulin [E11.42]  Yes      Resolved Hospital Problems   No resolved problems to display.       Patient is homebound due to:  Malignant carcinoid tumor of bronchus, tachy-eulogio syndrome requiring Micra placement, deconditioning     Allergies:  Review of patient's allergies indicates:   Allergen Reactions    Crestor [rosuvastatin] Swelling    Gluten protein        Vitals:  Routine    Diet: diabetic diet: 2000 calorie    Activities:   Up in a chair each morning as tolerated and Activity as tolerated    Goals of Care Treatment Preferences:  Code Status: Full Code      Labs:    -Renal chemistry labs per dialysis MD    Nursing Precautions:  Aspiration , Fall, and Pressure ulcer prevention    Consults:   PT to evaluate and treat- 3 times a week and OT to evaluate and treat- 3 times a week     Miscellaneous Care: Diabetes Care: Diabetes: Check blood sugar. Fingerstick blood sugar AC and HS  Sliding Scale/Hypoglycemia Protocol: For FSG<80, give 1  amp D50 or 1 glucose tablet. For FSG , do nothing. For -200, give 1 unit of novolog in addition to pre-meal insulin. For -250, give 2 units of novolog in addition to pre-meal insulin. For -300, give 3 units of novolog in addition to pre-meal insulin. For 301-350, give 4 units of novolog in addition to pre-meal insulin. For 351-400, give 5 units of novolog in addition to pre-meal insulin. For FSG >400, give 5 units of novolog in addition to pre-meal insulin and please call MD                  Medications: Discontinue all previous medication orders, if any. See new list below.     Medication List        START taking these medications      cefpodoxime 200 MG tablet  Commonly known as: VANTIN  Take 1 tablet (200 mg total) by mouth every evening. for 13 days     fluconazole 200 MG Tab  Commonly known as: DIFLUCAN  Take 1 tablet (200 mg total) by mouth every evening. for 7 days     metoprolol tartrate 25 MG tablet  Commonly known as: LOPRESSOR  Take 0.5 tablets (12.5 mg total) by mouth 2 (two) times daily.     metroNIDAZOLE 500 MG tablet  Commonly known as: FLAGYL  Take 1 tablet (500 mg total) by mouth every 12 (twelve) hours. for 13 days     venlafaxine 37.5 MG Tab  Commonly known as: EFFEXOR  Take 1 tablet (37.5 mg total) by mouth once daily.            CHANGE how you take these medications      atorvastatin 40 MG tablet  Commonly known as: LIPITOR  Take 1 tablet (40 mg total) by mouth once daily.  What changed: when to take this     midodrine 10 MG tablet  Commonly known as: PROAMATINE  Take 1 tablet (10 mg total) by mouth 2 (two) times daily with meals.  What changed:   medication strength  how much to take  when to take this  reasons to take this     pantoprazole 40 MG tablet  Commonly known as: PROTONIX  Take 1 tablet (40 mg total) by mouth 2 (two) times daily before meals.  What changed: when to take this            CONTINUE taking these medications      BETAHISTINE (BULK) MISC     blood  "sugar diagnostic Strp  Commonly known as: TRUE METRIX GLUCOSE TEST STRIP  USE ONE STRIP FOR TESTING 2 TIMES A DAY     blood-glucose meter kit  Use to test twice a day     calcium-vitamin D3 500 mg-5 mcg (200 unit) per tablet  Commonly known as: OS-CIPRIANO 500 + D3  Take 1 tablet by mouth once daily.     epoetin noble-epbx 10,000 unit/mL imjection  Commonly known as: RETACRIT  Inject 1 mL (10,000 Units total) into the skin every Mon, Wed, Fri. HOLD IF HEMOGLOBIN is 10 or GREATER    DO NOT SHAKE  DO NOT DILUTE  DO NOT MIX with other drug solutions     ferrous gluconate 324 MG tablet  Commonly known as: FERGON  Take 1 tablet (324 mg total) by mouth daily with breakfast.     insulin aspart U-100 100 unit/mL (3 mL) Inpn pen  Commonly known as: NovoLOG  Inject before meals:  150-200=+1, 201-250=+2, 251-300=+3; 301-350=+4, over 350=+5 units     lancets Misc  1 lancet by Misc.(Non-Drug; Combo Route) route 4 (four) times daily.     meclizine 25 mg tablet  Commonly known as: ANTIVERT  Take 1 tablet (25 mg total) by mouth 3 (three) times daily as needed for Dizziness.     melatonin 3 mg tablet  Commonly known as: MELATIN  Take 3 tablets (9 mg total) by mouth nightly as needed for Insomnia.     nutritional supplements Liqd  Take 237 mLs by mouth 4 (four) times daily.     patiromer calcium sorbitex 8.4 gram Pwpk  Commonly known as: VELTASSA  Take 2 packets (16.8 g total) by mouth once daily.     pen needle, diabetic 32 gauge x 5/32" Ndle  Commonly known as: BD ULTRA-FINE MARIS PEN NEEDLE  1 Device by Misc.(Non-Drug; Combo Route) route 4 (four) times daily with meals and nightly.     MELLISSA-BENJY 0.8 mg Tab  Generic drug: B complex-vitamin C-folic acid  Take 1 tablet (0.8 mg total) by mouth once daily.     sevelamer  MG Tab  Commonly known as: RENAGEL  Take 2 tablets (1,600 mg total) by mouth 3 (three) times daily with meals.     torsemide 40 mg Tab  Take 40 mg by mouth once daily.     vit C,L-Df-aokxs-lutein-zeaxan 250-90-40-1 mg " Cap  Commonly known as: PRESERVISION AREDS 2     vitamins A,C,E-zinc-copper 4,296 mcg-226 mg-90 mg Cap  Take 1 tablet by mouth once daily.            STOP taking these medications      coenzyme Q10 100 mg capsule     dicyclomine 10 MG capsule  Commonly known as: BENTYL     econazole nitrate 1 % cream     mometasone 0.1 % ointment  Commonly known as: ELOCON     neomycin 500 mg Tab  Commonly known as: MYCIFRADIN     oxyCODONE 5 MG immediate release tablet  Commonly known as: ROXICODONE     simethicone 125 mg Cap capsule  Commonly known as: MYLICON                Immunizations Administered as of 2/6/2024       Name Date Dose VIS Date Route Exp Date    COVID-19, MRNA, LN-S, PF (Pfizer) (Purple Cap) 3/6/2021 10:35 AM 0.3 mL 12/12/2020 Intramuscular 6/30/2021    Site: Left deltoid     Given By: Marcia Saenz LPN     : [employer]     Lot: [id number]     COVID-19, MRNA, LN-S, PF (Pfizer) (Purple Cap) 2/13/2021 11:01 AM 0.3 mL 12/12/2020 Intramuscular 6/30/2021    Site: Left deltoid     Given By: Gerri hCoi CRNA     : Pf[employer]     Lot: [id number]             This patient has had both covid vaccinations    Some patients may experience side effects after vaccination.  These may include fever, headache, muscle or joint aches.  Most symptoms resolve with 24-48 hours and do not require urgent medical evaluation unless they persist for more than 72 hours or symptoms are concerning for an unrelated medical condition.          _________________________________  Mike Lam MD  02/06/2024

## 2024-02-02 NOTE — PROGRESS NOTES
Temporary transfer for outpt dialysis requested via [facility] online portal. SW requesting placement at [facility].    GUEVARA will continue to follow with updates.    Kathy Agustin LMSW  [facility]  [phone]

## 2024-02-03 LAB
ALBUMIN SERPL BCP-MCNC: 2.2 G/DL (ref 3.5–5.2)
ALP SERPL-CCNC: 100 U/L (ref 55–135)
ALT SERPL W/O P-5'-P-CCNC: 6 U/L (ref 10–44)
ANION GAP SERPL CALC-SCNC: 12 MMOL/L (ref 8–16)
AST SERPL-CCNC: 13 U/L (ref 10–40)
BASOPHILS # BLD AUTO: 0.02 K/UL (ref 0–0.2)
BASOPHILS NFR BLD: 0.3 % (ref 0–1.9)
BILIRUB SERPL-MCNC: 0.5 MG/DL (ref 0.1–1)
BUN SERPL-MCNC: 17 MG/DL (ref 8–23)
CALCIUM SERPL-MCNC: 8.7 MG/DL (ref 8.7–10.5)
CHLORIDE SERPL-SCNC: 103 MMOL/L (ref 95–110)
CO2 SERPL-SCNC: 22 MMOL/L (ref 23–29)
CREAT SERPL-MCNC: 2.8 MG/DL (ref 0.5–1.4)
DIFFERENTIAL METHOD BLD: ABNORMAL
EOSINOPHIL # BLD AUTO: 0.1 K/UL (ref 0–0.5)
EOSINOPHIL NFR BLD: 1.5 % (ref 0–8)
ERYTHROCYTE [DISTWIDTH] IN BLOOD BY AUTOMATED COUNT: 24.8 % (ref 11.5–14.5)
EST. GFR  (NO RACE VARIABLE): 17.3 ML/MIN/1.73 M^2
GLUCOSE SERPL-MCNC: 90 MG/DL (ref 70–110)
HCT VFR BLD AUTO: 30.8 % (ref 37–48.5)
HGB BLD-MCNC: 9.7 G/DL (ref 12–16)
IMM GRANULOCYTES # BLD AUTO: 0.01 K/UL (ref 0–0.04)
IMM GRANULOCYTES NFR BLD AUTO: 0.2 % (ref 0–0.5)
LYMPHOCYTES # BLD AUTO: 0.6 K/UL (ref 1–4.8)
LYMPHOCYTES NFR BLD: 9.6 % (ref 18–48)
MAGNESIUM SERPL-MCNC: 2 MG/DL (ref 1.6–2.6)
MCH RBC QN AUTO: 29.5 PG (ref 27–31)
MCHC RBC AUTO-ENTMCNC: 31.5 G/DL (ref 32–36)
MCV RBC AUTO: 94 FL (ref 82–98)
MONOCYTES # BLD AUTO: 0.8 K/UL (ref 0.3–1)
MONOCYTES NFR BLD: 12.9 % (ref 4–15)
NEUTROPHILS # BLD AUTO: 4.4 K/UL (ref 1.8–7.7)
NEUTROPHILS NFR BLD: 75.5 % (ref 38–73)
NRBC BLD-RTO: 0 /100 WBC
PHOSPHATE SERPL-MCNC: 1.8 MG/DL (ref 2.7–4.5)
PLATELET # BLD AUTO: 129 K/UL (ref 150–450)
PMV BLD AUTO: 11 FL (ref 9.2–12.9)
POCT GLUCOSE: 157 MG/DL (ref 70–110)
POCT GLUCOSE: 171 MG/DL (ref 70–110)
POCT GLUCOSE: 197 MG/DL (ref 70–110)
POCT GLUCOSE: 92 MG/DL (ref 70–110)
POTASSIUM SERPL-SCNC: 3.7 MMOL/L (ref 3.5–5.1)
PROT SERPL-MCNC: 5.7 G/DL (ref 6–8.4)
RBC # BLD AUTO: 3.29 M/UL (ref 4–5.4)
SODIUM SERPL-SCNC: 137 MMOL/L (ref 136–145)
WBC # BLD AUTO: 5.81 K/UL (ref 3.9–12.7)

## 2024-02-03 PROCEDURE — 36415 COLL VENOUS BLD VENIPUNCTURE: CPT | Performed by: STUDENT IN AN ORGANIZED HEALTH CARE EDUCATION/TRAINING PROGRAM

## 2024-02-03 PROCEDURE — 83735 ASSAY OF MAGNESIUM: CPT

## 2024-02-03 PROCEDURE — 97110 THERAPEUTIC EXERCISES: CPT | Mod: CQ

## 2024-02-03 PROCEDURE — 84100 ASSAY OF PHOSPHORUS: CPT

## 2024-02-03 PROCEDURE — 25000003 PHARM REV CODE 250

## 2024-02-03 PROCEDURE — 94668 MNPJ CHEST WALL SBSQ: CPT

## 2024-02-03 PROCEDURE — 63600175 PHARM REV CODE 636 W HCPCS: Performed by: STUDENT IN AN ORGANIZED HEALTH CARE EDUCATION/TRAINING PROGRAM

## 2024-02-03 PROCEDURE — 25000003 PHARM REV CODE 250: Performed by: STUDENT IN AN ORGANIZED HEALTH CARE EDUCATION/TRAINING PROGRAM

## 2024-02-03 PROCEDURE — 85025 COMPLETE CBC W/AUTO DIFF WBC: CPT | Performed by: STUDENT IN AN ORGANIZED HEALTH CARE EDUCATION/TRAINING PROGRAM

## 2024-02-03 PROCEDURE — 25000242 PHARM REV CODE 250 ALT 637 W/ HCPCS

## 2024-02-03 PROCEDURE — 94799 UNLISTED PULMONARY SVC/PX: CPT | Mod: XB

## 2024-02-03 PROCEDURE — 97116 GAIT TRAINING THERAPY: CPT | Mod: CQ

## 2024-02-03 PROCEDURE — 94640 AIRWAY INHALATION TREATMENT: CPT

## 2024-02-03 PROCEDURE — 94664 DEMO&/EVAL PT USE INHALER: CPT

## 2024-02-03 PROCEDURE — 27000221 HC OXYGEN, UP TO 24 HOURS

## 2024-02-03 PROCEDURE — 25000242 PHARM REV CODE 250 ALT 637 W/ HCPCS: Performed by: STUDENT IN AN ORGANIZED HEALTH CARE EDUCATION/TRAINING PROGRAM

## 2024-02-03 PROCEDURE — 94761 N-INVAS EAR/PLS OXIMETRY MLT: CPT

## 2024-02-03 PROCEDURE — 27000646 HC AEROBIKA DEVICE

## 2024-02-03 PROCEDURE — 80053 COMPREHEN METABOLIC PANEL: CPT | Performed by: STUDENT IN AN ORGANIZED HEALTH CARE EDUCATION/TRAINING PROGRAM

## 2024-02-03 PROCEDURE — 63600175 PHARM REV CODE 636 W HCPCS

## 2024-02-03 PROCEDURE — 99900035 HC TECH TIME PER 15 MIN (STAT)

## 2024-02-03 PROCEDURE — 20600001 HC STEP DOWN PRIVATE ROOM

## 2024-02-03 PROCEDURE — 25000003 PHARM REV CODE 250: Performed by: SURGERY

## 2024-02-03 RX ORDER — PROCHLORPERAZINE EDISYLATE 5 MG/ML
2.5 INJECTION INTRAMUSCULAR; INTRAVENOUS EVERY 6 HOURS PRN
Status: DISCONTINUED | OUTPATIENT
Start: 2024-02-03 | End: 2024-02-06 | Stop reason: HOSPADM

## 2024-02-03 RX ORDER — SODIUM CHLORIDE 9 MG/ML
INJECTION, SOLUTION INTRAVENOUS ONCE
Status: COMPLETED | OUTPATIENT
Start: 2024-02-05 | End: 2024-02-05

## 2024-02-03 RX ORDER — SODIUM,POTASSIUM PHOSPHATES 280-250MG
1 POWDER IN PACKET (EA) ORAL ONCE
Status: COMPLETED | OUTPATIENT
Start: 2024-02-03 | End: 2024-02-03

## 2024-02-03 RX ADMIN — FLUCONAZOLE 200 MG: 200 TABLET ORAL at 09:02

## 2024-02-03 RX ADMIN — DRONABINOL 5 MG: 2.5 CAPSULE ORAL at 09:02

## 2024-02-03 RX ADMIN — LIDOCAINE 5% 1 PATCH: 700 PATCH TOPICAL at 09:02

## 2024-02-03 RX ADMIN — HEPARIN SODIUM 5000 UNITS: 5000 INJECTION INTRAVENOUS; SUBCUTANEOUS at 05:02

## 2024-02-03 RX ADMIN — ERYTHROMYCIN ETHYLSUCCINATE 200 MG: 200 SUSPENSION ORAL at 10:02

## 2024-02-03 RX ADMIN — ONDANSETRON 8 MG: 2 INJECTION INTRAMUSCULAR; INTRAVENOUS at 08:02

## 2024-02-03 RX ADMIN — VENLAFAXINE 37.5 MG: 37.5 TABLET ORAL at 10:02

## 2024-02-03 RX ADMIN — HEPARIN SODIUM 5000 UNITS: 5000 INJECTION INTRAVENOUS; SUBCUTANEOUS at 09:02

## 2024-02-03 RX ADMIN — MIDODRINE HYDROCHLORIDE 10 MG: 5 TABLET ORAL at 05:02

## 2024-02-03 RX ADMIN — SENNOSIDES AND DOCUSATE SODIUM 1 TABLET: 8.6; 5 TABLET ORAL at 10:02

## 2024-02-03 RX ADMIN — ACETYLCYSTEINE 4 ML: 100 INHALANT RESPIRATORY (INHALATION) at 08:02

## 2024-02-03 RX ADMIN — ALBUTEROL SULFATE 2.5 MG: 2.5 SOLUTION RESPIRATORY (INHALATION) at 02:02

## 2024-02-03 RX ADMIN — PANTOPRAZOLE SODIUM 40 MG: 40 TABLET, DELAYED RELEASE ORAL at 03:02

## 2024-02-03 RX ADMIN — INSULIN ASPART 2 UNITS: 100 INJECTION, SOLUTION INTRAVENOUS; SUBCUTANEOUS at 11:02

## 2024-02-03 RX ADMIN — CEFPODOXIME PROXETIL 200 MG: 200 TABLET, FILM COATED ORAL at 09:02

## 2024-02-03 RX ADMIN — OXYBUTYNIN CHLORIDE 5 MG: 5 TABLET ORAL at 10:02

## 2024-02-03 RX ADMIN — METRONIDAZOLE 500 MG: 500 TABLET ORAL at 10:02

## 2024-02-03 RX ADMIN — METOPROLOL TARTRATE 12.5 MG: 25 TABLET, FILM COATED ORAL at 09:02

## 2024-02-03 RX ADMIN — ERYTHROMYCIN ETHYLSUCCINATE 200 MG: 200 SUSPENSION ORAL at 05:02

## 2024-02-03 RX ADMIN — ALBUTEROL SULFATE 2.5 MG: 2.5 SOLUTION RESPIRATORY (INHALATION) at 08:02

## 2024-02-03 RX ADMIN — PANTOPRAZOLE SODIUM 40 MG: 40 TABLET, DELAYED RELEASE ORAL at 05:02

## 2024-02-03 RX ADMIN — OXYBUTYNIN CHLORIDE 5 MG: 5 TABLET ORAL at 09:02

## 2024-02-03 RX ADMIN — METOPROLOL TARTRATE 12.5 MG: 25 TABLET, FILM COATED ORAL at 10:02

## 2024-02-03 RX ADMIN — OXYBUTYNIN CHLORIDE 5 MG: 5 TABLET ORAL at 02:02

## 2024-02-03 RX ADMIN — HEPARIN SODIUM 5000 UNITS: 5000 INJECTION INTRAVENOUS; SUBCUTANEOUS at 02:02

## 2024-02-03 RX ADMIN — METRONIDAZOLE 500 MG: 500 TABLET ORAL at 09:02

## 2024-02-03 RX ADMIN — ACETYLCYSTEINE 4 ML: 100 INHALANT RESPIRATORY (INHALATION) at 02:02

## 2024-02-03 RX ADMIN — INSULIN ASPART 2 UNITS: 100 INJECTION, SOLUTION INTRAVENOUS; SUBCUTANEOUS at 05:02

## 2024-02-03 RX ADMIN — POTASSIUM & SODIUM PHOSPHATES POWDER PACK 280-160-250 MG 1 PACKET: 280-160-250 PACK at 10:02

## 2024-02-03 RX ADMIN — MIDODRINE HYDROCHLORIDE 10 MG: 5 TABLET ORAL at 10:02

## 2024-02-03 RX ADMIN — ALBUTEROL SULFATE 2.5 MG: 2.5 SOLUTION RESPIRATORY (INHALATION) at 07:02

## 2024-02-03 RX ADMIN — DRONABINOL 5 MG: 2.5 CAPSULE ORAL at 10:02

## 2024-02-03 RX ADMIN — ACETYLCYSTEINE 4 ML: 100 INHALANT RESPIRATORY (INHALATION) at 07:02

## 2024-02-03 RX ADMIN — ERYTHROMYCIN ETHYLSUCCINATE 200 MG: 200 SUSPENSION ORAL at 09:02

## 2024-02-03 RX ADMIN — ERYTHROMYCIN ETHYLSUCCINATE 200 MG: 200 SUSPENSION ORAL at 12:02

## 2024-02-03 NOTE — PT/OT/SLP PROGRESS
Physical Therapy Treatment    Patient Name:  Lily Green   MRN:  [id number]    Recommendations:     Discharge Recommendations: Moderate Intensity Therapy  Discharge Equipment Recommendations:  (TBD at next level of care)  Barriers to discharge: Inaccessible home and Decreased caregiver support    Assessment:     Lily Green is a 73 y.o. female admitted with a medical diagnosis of Malignant carcinoid tumor of bronchus.  She presents with the following impairments/functional limitations: weakness, impaired endurance, impaired self care skills, impaired functional mobility, gait instability, impaired balance, decreased coordination, decreased safety awareness, pain, impaired cardiopulmonary response to activity, impaired skin, edema . Patient initially required encouragement to participates she had difficulty maintaining static standing balance on first attempt. O2 sat was 99% prior to gait training and dropped to 90% after gait training with 2L O2 in tow.    Rehab Prognosis: Good and Fair; patient would benefit from acute skilled PT services to address these deficits and reach maximum level of function.    Recent Surgery: Procedure(s) (LRB):  EGD (ESOPHAGOGASTRODUODENOSCOPY) (N/A) 8 Days Post-Op    Plan:     During this hospitalization, patient to be seen 4 x/week to address the identified rehab impairments via gait training, therapeutic activities, therapeutic exercises, neuromuscular re-education and progress toward the following goals:    Plan of Care Expires:  02/09/24    Subjective     Chief Complaint: weakness and fatigue  Patient/Family Comments/goals: to get stronger  Pain/Comfort:  Pain Rating 1: 0/10  Pain Rating Post-Intervention 1: 0/10      Objective:     Communicated with NSG prior to session.  Patient found up in chair with oxygen, telemetry upon PT entry to room.     General Precautions: Standard, fall  Orthopedic Precautions: N/A  Braces: N/A  Respiratory Status: Nasal cannula,  flow 1 L/min     Functional Mobility:  Transfers:     Sit to Stand:  moderate assistance with rolling walker  Gait: 34 ft x 2 trials with RW and min assistance, with 2L O2 and bedside chair in tow by RN.      AM-PAC 6 CLICK MOBILITY  Turning over in bed (including adjusting bedclothes, sheets and blankets)?: 2  Sitting down on and standing up from a chair with arms (e.g., wheelchair, bedside commode, etc.): 2  Moving from lying on back to sitting on the side of the bed?: 2  Moving to and from a bed to a chair (including a wheelchair)?: 3  Need to walk in hospital room?: 3  Climbing 3-5 steps with a railing?: 1  Basic Mobility Total Score: 13       Treatment & Education:  Donned a second gown. Static standing balance 10 secs on first attempt and 40 secs on 2nd attempt with RW and min assistance. There ex in sitting: LAQ, HIP FLEX AND HEEL/TOE RAISES 2X12 REPS B LE. Educated on the importance of daily mobility.    Patient left up in chair with all lines intact, call button in reach, and spouse present.    GOALS:   Multidisciplinary Problems       Physical Therapy Goals          Problem: Physical Therapy    Goal Priority Disciplines Outcome Goal Variances Interventions   Physical Therapy Goal     PT, PT/OT Ongoing, Progressing     Description: PT goals until 2/9/24    1. Pt supine to sit with supervision-not met  2. Pt sit to supine with supervision-not met  3. Pt sit to stand with AD if needed with supervision-not met  4. Pt to perform gait 150 ft with AD if needed with supervision.-not met  5. Pt to go up/down curb step with AD if needed with CGA.-not met                         Time Tracking:     PT Received On: 02/03/24  PT Start Time: 1520     PT Stop Time: 1549  PT Total Time (min): 29 min     Billable Minutes: Gait Training 20 and Therapeutic Exercise 9    Treatment Type: Treatment  PT/PTA: PTA     Number of PTA visits since last PT visit: 4 02/03/2024

## 2024-02-03 NOTE — ASSESSMENT & PLAN NOTE
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 Stepped up to the ICU 1/5 for afib RVR which was refractory to medical management and c/b development of tachy-eulogio syndrome now s/p micra placement 1/16 for tachy-eulogio syndrome in the setting of RVR. Did have brief SBO type picture 1/7 which resolved with conservative management.     - s/p R thoracotomy with R lower lobectomy for carcinoid w/ MLND; IPV injury requiring repair. CT removed post operatively.  - Refractory afib RVR requiring ICU step up 1/05, s/p chemical cardioversion initially with recurrence c/b developing tachy-eulogio syndrome    - cards and EP following, appreciate assistance            - Plan for holter monitor 30 days after discharge followed by EP outpatient follow up    - s/p TVP placement and now micra implantation 1/16    - Heparin gtt resumed 1/22; will need to transition to eliquis likely following CT removal; held 1/23 2/2 GI bleed     - Heparin ppx 1/29  - s/p R chest tube placement with IR 1/17, L chest tube placement 1/18; L CT removed 1/25; Last R CT out 1/28  - Pulmonary toilet: IS, Acapella, Mucomyst daily; CPT ordered  - Infectious work up 2/2 leukocytosis. She remains afebrile. Effusions and compressive atelectasis also likely contributing to leukocytosis.Trended up to 30k 1/16, now downtrending. Zosyn 1/11-1/15; Meropenam 1/16 - 1/20; Plan for Cefpodoxime and metronidazole to start 1/21 per ID. 4 week abx plan for empiric treatment of presumed parapneumonic effusion - end date 2/15/2024.    - ID following, appreciate assistance. Reconsult following thora results.   - Bcx 1/11: Staph, probable contaminant  - Bcx1/12: No growth  - Rapid blood PCR: staph epi  - Sputum Cx 1/11: klebsiella & e. Coli  - R pleural fluid Cx 1/17: No growth (fungal  pending still, neg KOH)  - SBO type episode 1/07 requiring NGT decompression, which resolved with conservative management   - GI bleed 1/23/2024 - heparin held, 2uPRBC, 1uFFP, heparin reversed. GI consulted                       - EGD 1/24 - could not visualize stomach much 2/2 food residue; EGD aborted                       - 2u PRBC overnight 1/25 for Hgb 6.3, slow downtrend. Repeat EGD 1/26 demonstrates non-bleeding ulcers                  - Continue protonix. GI signed off. Repeat scope in 8 weeks. F/u H pylori   - Diabetic diet diet, novasource renal BID; marinol for appetite.   - Bowel regimen, PPI  - Nephrology following, appreciate assistance. CRRT 1/17 overnight. HD trial 1/19 passed but requiring midodrine, 1.5 L LR, metoprolol for RVR. HD 1/26           - LUE AVF created 10.2023, US 1/22/2023 with good flow metrics, okay to use per vascular   - Oxybutinin for bladder spasms   - MM pain control as needed  - Continue PT/OT, mobilization, likely post hospital placement     Dispo: Tentatively planning on DC to SNF on Monday

## 2024-02-03 NOTE — NURSING
[facility] Plan of Care Note  Dx: Carcinoid tumor of bronchus    Shift Events: uneventful shift    Goals of Care: fall free, no skin breakdown    Neuro: A&Ox4    Vital Signs: WDL    Respiratory: 2L NC    Diet: diabetic    Is patient tolerating current diet? yes    GTTS: none    Urine Output/Bowel Movement: anuric/ no bm    Drains/Tubes/Tube Feeds (include total output/shift): none    Lines: ML R, AV fistula L, HD R IJ      Accuchecks:ACHS    Skin: incision    Fall Risk Score: see flowsheet    Activity level? See flowsheet    Any scheduled procedures? none    Any safety concerns? Falls, skin breakdown    Other: none

## 2024-02-03 NOTE — PROGRESS NOTES
Jeovanny Dow - [facility]  Thoracic Surgery  Progress Note    Subjective:     History of Present Illness:  No notes on file    Post-Op Info:  Procedure(s) (LRB):  EGD (ESOPHAGOGASTRODUODENOSCOPY) (N/A)   8 Days Post-Op     Interval History:   NAEO  Having bowel function  Hgb stable  Making progress on Disposition. Potentially Monday.    Medications:  Continuous Infusions:  Scheduled Meds:   acetylcysteine 100 mg/ml (10%)  4 mL Nebulization TID WAKE    cefpodoxime  200 mg Oral QHS    droNABinol  5 mg Oral BID    epoetin noble (PROCRIT) injection  4,000 Units Intravenous Every Mon, Wed, Fri    erythromycin ethylsuccinate  200 mg Oral QID (WM & HS)    fluconazole  200 mg Oral QHS    heparin (porcine)  5,000 Units Subcutaneous Q8H    LIDOcaine  1 patch Transdermal Q24H    metoprolol tartrate  12.5 mg Oral BID    metroNIDAZOLE  500 mg Oral Q12H    midodrine  10 mg Oral BID WM    oxybutynin  5 mg Oral TID    pantoprazole  40 mg Oral BID AC    potassium, sodium phosphates  1 packet Oral Once    senna-docusate 8.6-50 mg  1 tablet Oral Daily    sodium chloride 0.9%  10 mL Intravenous Q6H    venlafaxine  37.5 mg Oral Daily     PRN Meds:sodium chloride 0.9%, acetaminophen, albuterol sulfate, bisacodyL, dextrose 10%, dextrose 10%, glucagon (human recombinant), glucose, glucose, heparin (porcine), heparin (porcine), hydrALAZINE, insulin aspart U-100, midodrine, ondansetron, sodium chloride 0.9%, Flushing PICC/Midline Protocol **AND** sodium chloride 0.9% **AND** sodium chloride 0.9%     Review of patient's allergies indicates:   Allergen Reactions    Crestor [rosuvastatin] Swelling    Gluten protein      Objective:     Vital Signs (Most Recent):  Temp: 98.1 °F (36.7 °C) (02/03/24 0714)  Pulse: 80 (02/03/24 0747)  Resp: 18 (02/03/24 0747)  BP: 124/67 (02/03/24 0714)  SpO2: 96 % (02/03/24 0747) Vital Signs (24h Range):  Temp:  [97.3 °F (36.3 °C)-98.4 °F (36.9 °C)] 98.1 °F (36.7 °C)  Pulse:  [67-89] 80  Resp:  [16-20] 18  SpO2:  [92 %-100 %]  96 %  BP: (115-171)/(55-80) 124/67     Intake/Output - Last 3 Shifts         02/01 0700  02/02 0659 02/02 0700  02/03 0659 02/03 0700  02/04 0659    P.O.  1090     I.V. (mL/kg) 10 (0.1)      Other  300     Total Intake(mL/kg) 10 (0.1) 1390 (20.6)     Urine (mL/kg/hr)  0 (0)     Other  2600     Stool  0     Total Output  2600     Net +10 -1210            Urine Occurrence 0 x 0 x     Stool Occurrence 0 x 1 x     Emesis Occurrence  0 x             SpO2: 96 %        Physical Exam  Vitals and nursing note reviewed.   Constitutional:       Appearance: Normal appearance.   HENT:      Head: Normocephalic and atraumatic.      Nose: Nose normal.   Cardiovascular:      Rate and Rhythm: Normal rate and regular rhythm.      Pulses: Normal pulses.   Pulmonary:      Effort: Pulmonary effort is normal. No respiratory distress.      Comments: Right posterior chest incision c/d/I  Right chest permacath    Abdominal:      General: Abdomen is flat. There is no distension.      Palpations: Abdomen is soft.      Tenderness: There is no abdominal tenderness.      Comments: Soft, nontender abdomen   Musculoskeletal:      Right lower leg: No edema.      Left lower leg: No edema.   Skin:     General: Skin is warm and dry.      Comments: Groin soft, dressings c/d/i   Neurological:      General: No focal deficit present.      Mental Status: She is alert.            Significant Labs:  CBC:   Recent Labs   Lab 02/03/24  0438   WBC 5.81   RBC 3.29*   HGB 9.7*   HCT 30.8*   *   MCV 94   MCH 29.5   MCHC 31.5*     CMP:   Recent Labs   Lab 02/03/24  0438   GLU 90   CALCIUM 8.7   ALBUMIN 2.2*   PROT 5.7*      K 3.7   CO2 22*      BUN 17   CREATININE 2.8*   ALKPHOS 100   ALT 6*   AST 13   BILITOT 0.5       Significant Diagnostics:  I have reviewed and interpreted all pertinent imaging results/findings within the past 24 hours.    VTE Risk Mitigation (From admission, onward)           Ordered     heparin (porcine) injection 1,000  Units  As needed (PRN)         02/01/24 1244     heparin (porcine) injection 5,000 Units  Every 8 hours         01/29/24 0800     IP VTE HIGH RISK PATIENT  Once         01/23/24 1527     heparin 25,000 units in dextrose 5% 250 mL (100 units/mL) infusion LOW INTENSITY nomogram - [facility]  Continuous        Question:  Begin at (units/kg/hr)  Answer:  16    01/15/24 1038     heparin (porcine) injection 1,000 Units  As needed (PRN)         01/11/24 1351     heparin (porcine) injection 1,000 Units  As needed (PRN)         01/03/24 1123     Place CARMELITA hose  Until discontinued         01/02/24 1226     Place sequential compression device  Until discontinued         01/02/24 1226                  Assessment/Plan:     * Malignant carcinoid tumor of bronchus  74 yo female with ESRD (TTS), DDD, benign essential tremor, meniere's disease, HTN, HLD, DM2, GERD, Celiac's, IBS and 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 Did have brief SBO type picture 1/7 which resolved with conservative management.     - s/p R thoracotomy with R lower lobectomy for carcinoid w/ MLND; IPV injury requiring repair. CT removed post operatively.  - Refractory afib RVR requiring ICU step up 1/05, s/p chemical cardioversion initially with recurrence c/b developing tachy-eulogio syndrome    - cards and EP following, appreciate assistance            - Plan for holter monitor 30 days after discharge followed by EP outpatient follow up    - s/p TVP placement and now micra implantation 1/16    - Heparin gtt resumed 1/22; will need to transition to eliquis likely following CT removal; held 1/23 2/2 GI bleed     - Heparin ppx 1/29  - s/p R chest tube placement with IR 1/17, L chest tube  placement 1/18; L CT removed 1/25; Last R CT out 1/28  - Pulmonary toilet: IS, Acapella, Mucomyst daily; CPT ordered  - Infectious work up 2/2 leukocytosis. She remains afebrile. Effusions and compressive atelectasis also likely contributing to leukocytosis.Trended up to 30k 1/16, now downtrending. Zosyn 1/11-1/15; Meropenam 1/16 - 1/20; Plan for Cefpodoxime and metronidazole to start 1/21 per ID. 4 week abx plan for empiric treatment of presumed parapneumonic effusion - end date 2/15/2024.    - ID following, appreciate assistance. Reconsult following thora results.   - Bcx 1/11: Staph, probable contaminant  - Bcx1/12: No growth  - Rapid blood PCR: staph epi  - Sputum Cx 1/11: klebsiella & e. Coli  - R pleural fluid Cx 1/17: No growth (fungal pending still, neg KOH)  - SBO type episode 1/07 requiring NGT decompression, which resolved with conservative management   - GI bleed 1/23/2024 - heparin held, 2uPRBC, 1uFFP, heparin reversed. GI consulted                       - EGD 1/24 - could not visualize stomach much 2/2 food residue; EGD aborted                       - 2u PRBC overnight 1/25 for Hgb 6.3, slow downtrend. Repeat EGD 1/26 demonstrates non-bleeding ulcers                  - Continue protonix. GI signed off. Repeat scope in 8 weeks. F/u H pylori   - Diabetic diet diet, novasource renal BID; marinol for appetite.   - Bowel regimen, PPI  - Nephrology following, appreciate assistance. CRRT 1/17 overnight. HD trial 1/19 passed but requiring midodrine, 1.5 L LR, metoprolol for RVR. HD 1/26           - LUE AVF created 10.2023, US 1/22/2023 with good flow metrics, okay to use per vascular   - Oxybutinin for bladder spasms   - MM pain control as needed  - Continue PT/OT, mobilization, likely post hospital placement     Dispo: Tentatively planning on DC to SNF on Monday        Mike Lam MD  Thoracic Surgery  [facility]

## 2024-02-03 NOTE — SUBJECTIVE & OBJECTIVE
Interval History:   NAEO  Having bowel function  Hgb stable  Making progress on Disposition. Potentially Monday.    Medications:  Continuous Infusions:  Scheduled Meds:   acetylcysteine 100 mg/ml (10%)  4 mL Nebulization TID WAKE    cefpodoxime  200 mg Oral QHS    droNABinol  5 mg Oral BID    epoetin noble (PROCRIT) injection  4,000 Units Intravenous Every Mon, Wed, Fri    erythromycin ethylsuccinate  200 mg Oral QID (WM & HS)    fluconazole  200 mg Oral QHS    heparin (porcine)  5,000 Units Subcutaneous Q8H    LIDOcaine  1 patch Transdermal Q24H    metoprolol tartrate  12.5 mg Oral BID    metroNIDAZOLE  500 mg Oral Q12H    midodrine  10 mg Oral BID WM    oxybutynin  5 mg Oral TID    pantoprazole  40 mg Oral BID AC    potassium, sodium phosphates  1 packet Oral Once    senna-docusate 8.6-50 mg  1 tablet Oral Daily    sodium chloride 0.9%  10 mL Intravenous Q6H    venlafaxine  37.5 mg Oral Daily     PRN Meds:sodium chloride 0.9%, acetaminophen, albuterol sulfate, bisacodyL, dextrose 10%, dextrose 10%, glucagon (human recombinant), glucose, glucose, heparin (porcine), heparin (porcine), hydrALAZINE, insulin aspart U-100, midodrine, ondansetron, sodium chloride 0.9%, Flushing PICC/Midline Protocol **AND** sodium chloride 0.9% **AND** sodium chloride 0.9%     Review of patient's allergies indicates:   Allergen Reactions    Crestor [rosuvastatin] Swelling    Gluten protein      Objective:     Vital Signs (Most Recent):  Temp: 98.1 °F (36.7 °C) (02/03/24 0714)  Pulse: 80 (02/03/24 0747)  Resp: 18 (02/03/24 0747)  BP: 124/67 (02/03/24 0714)  SpO2: 96 % (02/03/24 0747) Vital Signs (24h Range):  Temp:  [97.3 °F (36.3 °C)-98.4 °F (36.9 °C)] 98.1 °F (36.7 °C)  Pulse:  [67-89] 80  Resp:  [16-20] 18  SpO2:  [92 %-100 %] 96 %  BP: (115-171)/(55-80) 124/67     Intake/Output - Last 3 Shifts         02/01 0700  02/02 0659 02/02 0700  02/03 0659 02/03 0700  02/04 0659    P.O.  1090     I.V. (mL/kg) 10 (0.1)      Other  300     Total  Intake(mL/kg) 10 (0.1) 1390 (20.6)     Urine (mL/kg/hr)  0 (0)     Other  2600     Stool  0     Total Output  2600     Net +10 -1210            Urine Occurrence 0 x 0 x     Stool Occurrence 0 x 1 x     Emesis Occurrence  0 x             SpO2: 96 %        Physical Exam  Vitals and nursing note reviewed.   Constitutional:       Appearance: Normal appearance.   HENT:      Head: Normocephalic and atraumatic.      Nose: Nose normal.   Cardiovascular:      Rate and Rhythm: Normal rate and regular rhythm.      Pulses: Normal pulses.   Pulmonary:      Effort: Pulmonary effort is normal. No respiratory distress.      Comments: Right posterior chest incision c/d/I  Right chest permacath    Abdominal:      General: Abdomen is flat. There is no distension.      Palpations: Abdomen is soft.      Tenderness: There is no abdominal tenderness.      Comments: Soft, nontender abdomen   Musculoskeletal:      Right lower leg: No edema.      Left lower leg: No edema.   Skin:     General: Skin is warm and dry.      Comments: Groin soft, dressings c/d/i   Neurological:      General: No focal deficit present.      Mental Status: She is alert.            Significant Labs:  CBC:   Recent Labs   Lab 02/03/24  0438   WBC 5.81   RBC 3.29*   HGB 9.7*   HCT 30.8*   *   MCV 94   MCH 29.5   MCHC 31.5*     CMP:   Recent Labs   Lab 02/03/24  0438   GLU 90   CALCIUM 8.7   ALBUMIN 2.2*   PROT 5.7*      K 3.7   CO2 22*      BUN 17   CREATININE 2.8*   ALKPHOS 100   ALT 6*   AST 13   BILITOT 0.5       Significant Diagnostics:  I have reviewed and interpreted all pertinent imaging results/findings within the past 24 hours.    VTE Risk Mitigation (From admission, onward)           Ordered     heparin (porcine) injection 1,000 Units  As needed (PRN)         02/01/24 1244     heparin (porcine) injection 5,000 Units  Every 8 hours         01/29/24 0800     IP VTE HIGH RISK PATIENT  Once         01/23/24 1527     heparin 25,000 units in  dextrose 5% 250 mL (100 units/mL) infusion LOW INTENSITY nomogram - [facility]  Continuous        Question:  Begin at (units/kg/hr)  Answer:  16    01/15/24 1038     heparin (porcine) injection 1,000 Units  As needed (PRN)         01/11/24 1351     heparin (porcine) injection 1,000 Units  As needed (PRN)         01/03/24 1123     Place CARMELITA hose  Until discontinued         01/02/24 1226     Place sequential compression device  Until discontinued         01/02/24 1226

## 2024-02-03 NOTE — PLAN OF CARE
SW receiving Secure Chat message from nurse stating pt/family has not received admission paperwork from [facility] via email.    Katherine Velasco LMSW

## 2024-02-03 NOTE — CARE UPDATE
-Glucose Goal 140-180    -A1C:   Hemoglobin A1C   Date Value Ref Range Status   11/15/2023 4.9 4.0 - 5.6 % Final     Comment:     ADA Screening Guidelines:  5.7-6.4%  Consistent with prediabetes  >or=6.5%  Consistent with diabetes    High levels of fetal hemoglobin interfere with the HbA1C  assay. Heterozygous hemoglobin variants (HbS, HgC, etc)do  not significantly interfere with this assay.   However, presence of multiple variants may affect accuracy.     10/23/2019 6.8 % Final         -HOME REGIMEN:   LDC SSI   Blood sugar 150 to 200, + 1 unit  Blood sugar 201 to 250, + 2 units  Blood sugar 251 to 300, + 3 units  Blood sugar 301 to 350, + 4 units   Blood sugar greater than 350, + 5 units      -GLUCOSE TREND FOR THE PAST 24HRS:   Recent Labs   Lab 02/01/24  1655 02/01/24 2008 02/02/24  0708 02/02/24  1406 02/02/24  1540 02/03/24  0715   POCTGLUCOSE 137* 103 86 92 110 157*           -NO HYPOGYCEMIAS NOTED     - Diet  Diet [place] Gluten Free     novasource renal BID; marinol for appetite.     -TOLERATING 0-25 % OF PO DIET. Insulin needs continue to vary as patient's nutritional status is inconsistent.       Plan:   - Continue Novolog Mod dose correction with ISF 25 starting at 150 prn   - POCT Glucose before meals and at bedtime   - Hypoglycemia protocol in place      ** Please notify Endocrine for any change and/or advance in diet**  ** Please call Endocrine for any BG related issues **     Discharge Planning:   TBD. Please notify endocrinology prior to discharge.

## 2024-02-03 NOTE — PLAN OF CARE
"  Problem: Adult Inpatient Plan of Care  Goal: Plan of Care Review  Outcome: Ongoing, Progressing  Goal: Patient-Specific Goal (Individualized)  Outcome: Ongoing, Progressing  Goal: Absence of Hospital-Acquired Illness or Injury  Outcome: Ongoing, Progressing  Goal: Optimal Comfort and Wellbeing  Outcome: Ongoing, Progressing  Goal: Readiness for Transition of Care  Outcome: Ongoing, Progressing   [facility] Plan of Care Note  Dx Malignant carcinoid tumor of lung     Shift Events weaned from 2L to 1L -SpO2 95-98% but having SOB with activities; nausea x1- relieved with Zofran, pt complained of ABD pain - stated" I felt like someone punched me in my stomach"-  notified about pt pain and nausea and poor oral intake- MD informed nurse that pt has gastroparesis and hard to treat- team awared of pt poor oral intake.  Ambulated the fernandez x1 with PT- had  anxiety as she first walked and feeling weaned- needed encouragement and reassurance   Goals of Care: VS and lab WDL, optimized nutrition; ambulating more often     Neuro: AAOx4     Vital Signs: stable     Respiratory: RA     Diet: 2000 kary + Novasource renal     Is patient tolerating current diet?poor- consumed less than 25% of her meals     GTTS: none     Urine Output/Bowel Movement: romelia; 1 BM with soft brown stool     Drains/Tubes/Tube Feeds (include total output/shift): none     Lines: midline IV, HD catheter, AV fistula        Accuchecks:ac/hs     Skin: right upper back incision with Dermabond RICHARD CDI     Fall Risk Score: 17     Activity level? X2 assist- bed to chair/BSC+ ambulated the fernandez x1;      Any scheduled procedures? none     Any safety concerns? Fall risk precaution     Other:   "

## 2024-02-04 LAB
ALBUMIN SERPL BCP-MCNC: 2.2 G/DL (ref 3.5–5.2)
ALP SERPL-CCNC: 94 U/L (ref 55–135)
ALT SERPL W/O P-5'-P-CCNC: 5 U/L (ref 10–44)
ANION GAP SERPL CALC-SCNC: 9 MMOL/L (ref 8–16)
AST SERPL-CCNC: 12 U/L (ref 10–40)
BASOPHILS # BLD AUTO: 0.02 K/UL (ref 0–0.2)
BASOPHILS NFR BLD: 0.3 % (ref 0–1.9)
BILIRUB SERPL-MCNC: 0.4 MG/DL (ref 0.1–1)
BUN SERPL-MCNC: 28 MG/DL (ref 8–23)
CALCIUM SERPL-MCNC: 8.6 MG/DL (ref 8.7–10.5)
CHLORIDE SERPL-SCNC: 104 MMOL/L (ref 95–110)
CO2 SERPL-SCNC: 22 MMOL/L (ref 23–29)
CREAT SERPL-MCNC: 3.7 MG/DL (ref 0.5–1.4)
DIFFERENTIAL METHOD BLD: ABNORMAL
EOSINOPHIL # BLD AUTO: 0.1 K/UL (ref 0–0.5)
EOSINOPHIL NFR BLD: 2.2 % (ref 0–8)
ERYTHROCYTE [DISTWIDTH] IN BLOOD BY AUTOMATED COUNT: 24.9 % (ref 11.5–14.5)
EST. GFR  (NO RACE VARIABLE): 12.4 ML/MIN/1.73 M^2
GLUCOSE SERPL-MCNC: 126 MG/DL (ref 70–110)
HCT VFR BLD AUTO: 30.8 % (ref 37–48.5)
HGB BLD-MCNC: 9.4 G/DL (ref 12–16)
IMM GRANULOCYTES # BLD AUTO: 0.03 K/UL (ref 0–0.04)
IMM GRANULOCYTES NFR BLD AUTO: 0.5 % (ref 0–0.5)
LYMPHOCYTES # BLD AUTO: 0.6 K/UL (ref 1–4.8)
LYMPHOCYTES NFR BLD: 9.4 % (ref 18–48)
MAGNESIUM SERPL-MCNC: 1.9 MG/DL (ref 1.6–2.6)
MCH RBC QN AUTO: 29.3 PG (ref 27–31)
MCHC RBC AUTO-ENTMCNC: 30.5 G/DL (ref 32–36)
MCV RBC AUTO: 96 FL (ref 82–98)
MONOCYTES # BLD AUTO: 0.9 K/UL (ref 0.3–1)
MONOCYTES NFR BLD: 15.4 % (ref 4–15)
NEUTROPHILS # BLD AUTO: 4.2 K/UL (ref 1.8–7.7)
NEUTROPHILS NFR BLD: 72.2 % (ref 38–73)
NRBC BLD-RTO: 0 /100 WBC
PHOSPHATE SERPL-MCNC: 1.8 MG/DL (ref 2.7–4.5)
PLATELET # BLD AUTO: 161 K/UL (ref 150–450)
PMV BLD AUTO: 10.8 FL (ref 9.2–12.9)
POCT GLUCOSE: 123 MG/DL (ref 70–110)
POCT GLUCOSE: 130 MG/DL (ref 70–110)
POCT GLUCOSE: 211 MG/DL (ref 70–110)
POTASSIUM SERPL-SCNC: 3.6 MMOL/L (ref 3.5–5.1)
PROT SERPL-MCNC: 5.7 G/DL (ref 6–8.4)
RBC # BLD AUTO: 3.21 M/UL (ref 4–5.4)
SODIUM SERPL-SCNC: 135 MMOL/L (ref 136–145)
WBC # BLD AUTO: 5.86 K/UL (ref 3.9–12.7)

## 2024-02-04 PROCEDURE — 85025 COMPLETE CBC W/AUTO DIFF WBC: CPT | Performed by: STUDENT IN AN ORGANIZED HEALTH CARE EDUCATION/TRAINING PROGRAM

## 2024-02-04 PROCEDURE — 25000003 PHARM REV CODE 250

## 2024-02-04 PROCEDURE — A4216 STERILE WATER/SALINE, 10 ML: HCPCS | Performed by: STUDENT IN AN ORGANIZED HEALTH CARE EDUCATION/TRAINING PROGRAM

## 2024-02-04 PROCEDURE — 36415 COLL VENOUS BLD VENIPUNCTURE: CPT | Performed by: STUDENT IN AN ORGANIZED HEALTH CARE EDUCATION/TRAINING PROGRAM

## 2024-02-04 PROCEDURE — 83735 ASSAY OF MAGNESIUM: CPT

## 2024-02-04 PROCEDURE — 20600001 HC STEP DOWN PRIVATE ROOM

## 2024-02-04 PROCEDURE — 63600175 PHARM REV CODE 636 W HCPCS: Performed by: STUDENT IN AN ORGANIZED HEALTH CARE EDUCATION/TRAINING PROGRAM

## 2024-02-04 PROCEDURE — 27000646 HC AEROBIKA DEVICE

## 2024-02-04 PROCEDURE — 27000221 HC OXYGEN, UP TO 24 HOURS

## 2024-02-04 PROCEDURE — 99900035 HC TECH TIME PER 15 MIN (STAT)

## 2024-02-04 PROCEDURE — 94761 N-INVAS EAR/PLS OXIMETRY MLT: CPT

## 2024-02-04 PROCEDURE — 94664 DEMO&/EVAL PT USE INHALER: CPT

## 2024-02-04 PROCEDURE — 25000242 PHARM REV CODE 250 ALT 637 W/ HCPCS: Performed by: STUDENT IN AN ORGANIZED HEALTH CARE EDUCATION/TRAINING PROGRAM

## 2024-02-04 PROCEDURE — 63600175 PHARM REV CODE 636 W HCPCS

## 2024-02-04 PROCEDURE — 25000003 PHARM REV CODE 250: Performed by: STUDENT IN AN ORGANIZED HEALTH CARE EDUCATION/TRAINING PROGRAM

## 2024-02-04 PROCEDURE — 94640 AIRWAY INHALATION TREATMENT: CPT

## 2024-02-04 PROCEDURE — 97535 SELF CARE MNGMENT TRAINING: CPT

## 2024-02-04 PROCEDURE — 80053 COMPREHEN METABOLIC PANEL: CPT | Performed by: STUDENT IN AN ORGANIZED HEALTH CARE EDUCATION/TRAINING PROGRAM

## 2024-02-04 PROCEDURE — 25000242 PHARM REV CODE 250 ALT 637 W/ HCPCS

## 2024-02-04 PROCEDURE — 84100 ASSAY OF PHOSPHORUS: CPT

## 2024-02-04 PROCEDURE — 94668 MNPJ CHEST WALL SBSQ: CPT

## 2024-02-04 PROCEDURE — 25000003 PHARM REV CODE 250: Performed by: SURGERY

## 2024-02-04 RX ORDER — SODIUM,POTASSIUM PHOSPHATES 280-250MG
2 POWDER IN PACKET (EA) ORAL ONCE
Status: COMPLETED | OUTPATIENT
Start: 2024-02-04 | End: 2024-02-04

## 2024-02-04 RX ADMIN — ACETYLCYSTEINE 4 ML: 100 INHALANT RESPIRATORY (INHALATION) at 08:02

## 2024-02-04 RX ADMIN — ACETYLCYSTEINE 4 ML: 100 INHALANT RESPIRATORY (INHALATION) at 07:02

## 2024-02-04 RX ADMIN — OXYBUTYNIN CHLORIDE 5 MG: 5 TABLET ORAL at 08:02

## 2024-02-04 RX ADMIN — PANTOPRAZOLE SODIUM 40 MG: 40 TABLET, DELAYED RELEASE ORAL at 05:02

## 2024-02-04 RX ADMIN — Medication 10 ML: at 11:02

## 2024-02-04 RX ADMIN — OXYBUTYNIN CHLORIDE 5 MG: 5 TABLET ORAL at 02:02

## 2024-02-04 RX ADMIN — FLUCONAZOLE 200 MG: 200 TABLET ORAL at 09:02

## 2024-02-04 RX ADMIN — ERYTHROMYCIN ETHYLSUCCINATE 200 MG: 200 SUSPENSION ORAL at 08:02

## 2024-02-04 RX ADMIN — PANTOPRAZOLE SODIUM 40 MG: 40 TABLET, DELAYED RELEASE ORAL at 04:02

## 2024-02-04 RX ADMIN — DRONABINOL 5 MG: 2.5 CAPSULE ORAL at 08:02

## 2024-02-04 RX ADMIN — OXYBUTYNIN CHLORIDE 5 MG: 5 TABLET ORAL at 09:02

## 2024-02-04 RX ADMIN — METOPROLOL TARTRATE 12.5 MG: 25 TABLET, FILM COATED ORAL at 08:02

## 2024-02-04 RX ADMIN — METOPROLOL TARTRATE 12.5 MG: 25 TABLET, FILM COATED ORAL at 09:02

## 2024-02-04 RX ADMIN — ALBUTEROL SULFATE 2.5 MG: 2.5 SOLUTION RESPIRATORY (INHALATION) at 07:02

## 2024-02-04 RX ADMIN — CEFPODOXIME PROXETIL 200 MG: 200 TABLET, FILM COATED ORAL at 09:02

## 2024-02-04 RX ADMIN — HEPARIN SODIUM 5000 UNITS: 5000 INJECTION INTRAVENOUS; SUBCUTANEOUS at 05:02

## 2024-02-04 RX ADMIN — ALBUTEROL SULFATE 2.5 MG: 2.5 SOLUTION RESPIRATORY (INHALATION) at 08:02

## 2024-02-04 RX ADMIN — ACETYLCYSTEINE 4 ML: 100 INHALANT RESPIRATORY (INHALATION) at 01:02

## 2024-02-04 RX ADMIN — ERYTHROMYCIN ETHYLSUCCINATE 200 MG: 200 SUSPENSION ORAL at 09:02

## 2024-02-04 RX ADMIN — MIDODRINE HYDROCHLORIDE 10 MG: 5 TABLET ORAL at 08:02

## 2024-02-04 RX ADMIN — ERYTHROMYCIN ETHYLSUCCINATE 200 MG: 200 SUSPENSION ORAL at 11:02

## 2024-02-04 RX ADMIN — ACETAMINOPHEN 650 MG: 325 TABLET ORAL at 04:02

## 2024-02-04 RX ADMIN — INSULIN ASPART 4 UNITS: 100 INJECTION, SOLUTION INTRAVENOUS; SUBCUTANEOUS at 04:02

## 2024-02-04 RX ADMIN — ALBUTEROL SULFATE 2.5 MG: 2.5 SOLUTION RESPIRATORY (INHALATION) at 01:02

## 2024-02-04 RX ADMIN — HEPARIN SODIUM 5000 UNITS: 5000 INJECTION INTRAVENOUS; SUBCUTANEOUS at 09:02

## 2024-02-04 RX ADMIN — LIDOCAINE 5% 1 PATCH: 700 PATCH TOPICAL at 08:02

## 2024-02-04 RX ADMIN — VENLAFAXINE 37.5 MG: 37.5 TABLET ORAL at 08:02

## 2024-02-04 RX ADMIN — DRONABINOL 5 MG: 2.5 CAPSULE ORAL at 09:02

## 2024-02-04 RX ADMIN — METRONIDAZOLE 500 MG: 500 TABLET ORAL at 08:02

## 2024-02-04 RX ADMIN — ERYTHROMYCIN ETHYLSUCCINATE 200 MG: 200 SUSPENSION ORAL at 07:02

## 2024-02-04 RX ADMIN — POTASSIUM & SODIUM PHOSPHATES POWDER PACK 280-160-250 MG 2 PACKET: 280-160-250 PACK at 08:02

## 2024-02-04 RX ADMIN — METRONIDAZOLE 500 MG: 500 TABLET ORAL at 09:02

## 2024-02-04 RX ADMIN — HEPARIN SODIUM 5000 UNITS: 5000 INJECTION INTRAVENOUS; SUBCUTANEOUS at 02:02

## 2024-02-04 NOTE — CARE UPDATE
-Glucose Goal 140-180    -A1C:   Hemoglobin A1C   Date Value Ref Range Status   11/15/2023 4.9 4.0 - 5.6 % Final     Comment:     ADA Screening Guidelines:  5.7-6.4%  Consistent with prediabetes  >or=6.5%  Consistent with diabetes    High levels of fetal hemoglobin interfere with the HbA1C  assay. Heterozygous hemoglobin variants (HbS, HgC, etc)do  not significantly interfere with this assay.   However, presence of multiple variants may affect accuracy.     10/23/2019 6.8 % Final         -HOME REGIMEN:   LDC SSI   Blood sugar 150 to 200, + 1 unit  Blood sugar 201 to 250, + 2 units  Blood sugar 251 to 300, + 3 units  Blood sugar 301 to 350, + 4 units   Blood sugar greater than 350, + 5 units      -GLUCOSE TREND FOR THE PAST 24HRS:   Recent Labs   Lab 02/02/24  1406 02/02/24  1540 02/03/24  0715 02/03/24  1118 02/03/24  1513 02/04/24  0736   POCTGLUCOSE 92 110 157* 197* 171* 130*         -NO HYPOGYCEMIAS NOTED     - Diet  Diet [place] Gluten Free     novasource renal BID; marinol for appetite.     -TOLERATING 0-25 % OF PO DIET.       Plan:   - Continue Novolog Mod dose correction with ISF 25 starting at 150 prn   - POCT Glucose before meals and at bedtime   - Hypoglycemia protocol in place      ** Please notify Endocrine for any change and/or advance in diet**  ** Please call Endocrine for any BG related issues **     Discharge Planning:   TBD. Please notify endocrinology prior to discharge.

## 2024-02-04 NOTE — PT/OT/SLP PROGRESS
Occupational Therapy   Treatment    Name: Lily Green  MRN: [id number]  Admitting Diagnosis:  Malignant carcinoid tumor of bronchus  9 Days Post-Op    Recommendations:     Discharge Recommendations: Moderate Intensity Therapy  Discharge Equipment Recommendations:  to be determined by next level of care  Barriers to discharge:  None    Assessment:     Lily Green is a 73 y.o. female with a medical diagnosis of Malignant carcinoid tumor of bronchus.  She presents with decrease functional status secondary to medical diagnosis. Performance deficits affecting function are weakness, impaired endurance, impaired self care skills, impaired functional mobility, gait instability, impaired balance, decreased coordination, decreased safety awareness, pain, impaired cardiopulmonary response to activity, impaired skin, edema. Patient with good tolerance to OT session. Patient remains appropriate candidate for acute OT services.       Rehab Prognosis:  Fair; patient would benefit from acute skilled OT services to address these deficits and reach maximum level of function.       Plan:     Patient to be seen 4 x/week to address the above listed problems via self-care/home management, therapeutic activities, therapeutic exercises, neuromuscular re-education  Plan of Care Expires: 03/04/24  Plan of Care Reviewed with: patient    Subjective     Chief Complaint: None at this time  Patient/Family Comments/goals: increase functional status  Pain/Comfort:  Pain Rating 1: 0/10    Objective:     Communicated with: RN prior to session.  Patient found up in chair with telemetry, oxygen upon OT entry to room.    General Precautions: Standard, fall    Orthopedic Precautions:N/A  Braces: N/A  Respiratory Status: Room air     Occupational Performance:     Bed Mobility:    Patient in chair     Functional Mobility/Transfers:  Patient completed Sit <> Stand Transfer with moderate assistance  with  rolling walker x4 trials    Functional Mobility: Patient able to ambulate ~30 feet; ~24 feet, ~50 feet with seated RB due to increased fatigue     [facility] 6 Click ADL: 11    Treatment & Education:  Patient provided with education on breathing techniques at to increase activity tolerance during gait trial. OT provided education on home recommendations and fall prevention in preparation for D/C.     Patient left up in chair with all lines intact and call button in reach    GOALS:   Multidisciplinary Problems       Occupational Therapy Goals          Problem: Occupational Therapy    Goal Priority Disciplines Outcome Interventions   Occupational Therapy Goal     OT, PT/OT Ongoing, Progressing    Description: Goals to be met by: 02/2/24     Patient will increase functional independence with ADLs by performing:    UE Dressing with Supervision.  Grooming while standing at sink with Modified Casey.  Toileting from toilet with Modified Casey for hygiene and clothing management.   Supine to sit with Supervision.  Toilet transfer to toilet with Supervision.                         Time Tracking:     OT Date of Treatment: 02/04/24  OT Start Time: 1200  OT Stop Time: 1230  OT Total Time (min): 30 min    Billable Minutes:Therapeutic Activity 30 mintues    OT/RICHARD: OT          2/4/2024

## 2024-02-04 NOTE — NURSING
[facility] Plan of Care Note  Dx: Carcinoid tumor of bronchus     Shift Events: uneventful shift     Goals of Care: fall free, no skin breakdown     Neuro: A&Ox4     Vital Signs: WDL     Respiratory: 2L NC     Diet: diabetic     Is patient tolerating current diet? yes     GTTS: none     Urine Output/Bowel Movement: anuric/ no bm     Drains/Tubes/Tube Feeds (include total output/shift): none     Lines: ML R, AV fistula L, HD R IJ        Accuchecks:ACHS     Skin: incision     Fall Risk Score: see flowsheet     Activity level? See flowsheet     Any scheduled procedures? none     Any safety concerns? Falls, skin breakdown     Other: none

## 2024-02-04 NOTE — SUBJECTIVE & OBJECTIVE
Interval History:   NAEO  Hgb stable  Continuing to feel stronger    Medications:  Continuous Infusions:  Scheduled Meds:   [START ON 2/5/2024] sodium chloride 0.9%   Intravenous Once    acetylcysteine 100 mg/ml (10%)  4 mL Nebulization TID WAKE    cefpodoxime  200 mg Oral QHS    droNABinol  5 mg Oral BID    epoetin noble (PROCRIT) injection  4,000 Units Intravenous Every Mon, Wed, Fri    erythromycin ethylsuccinate  200 mg Oral QID (WM & HS)    fluconazole  200 mg Oral QHS    heparin (porcine)  5,000 Units Subcutaneous Q8H    LIDOcaine  1 patch Transdermal Q24H    metoprolol tartrate  12.5 mg Oral BID    metroNIDAZOLE  500 mg Oral Q12H    midodrine  10 mg Oral BID WM    oxybutynin  5 mg Oral TID    pantoprazole  40 mg Oral BID AC    potassium, sodium phosphates  2 packet Oral Once    senna-docusate 8.6-50 mg  1 tablet Oral Daily    sodium chloride 0.9%  10 mL Intravenous Q6H    venlafaxine  37.5 mg Oral Daily     PRN Meds:sodium chloride 0.9%, acetaminophen, albuterol sulfate, bisacodyL, dextrose 10%, dextrose 10%, glucagon (human recombinant), glucose, glucose, heparin (porcine), hydrALAZINE, insulin aspart U-100, midodrine, ondansetron, prochlorperazine, sodium chloride 0.9%, Flushing PICC/Midline Protocol **AND** sodium chloride 0.9% **AND** sodium chloride 0.9%     Review of patient's allergies indicates:   Allergen Reactions    Crestor [rosuvastatin] Swelling    Gluten protein      Objective:     Vital Signs (Most Recent):  Temp: 97.7 °F (36.5 °C) (02/04/24 0734)  Pulse: 79 (02/04/24 0751)  Resp: 18 (02/04/24 0751)  BP: 138/74 (02/04/24 0734)  SpO2: 97 % (02/04/24 0751) Vital Signs (24h Range):  Temp:  [97.4 °F (36.3 °C)-98.3 °F (36.8 °C)] 97.7 °F (36.5 °C)  Pulse:  [77-92] 79  Resp:  [16-20] 18  SpO2:  [94 %-100 %] 97 %  BP: (126-139)/(62-75) 138/74     Intake/Output - Last 3 Shifts         02/02 0700  02/03 0659 02/03 0700  02/04 0659 02/04 0700  02/05 0659    P.O. 1090 670     I.V. (mL/kg)       Other 300       Total Intake(mL/kg) 1390 (20.6) 670 (9.9)     Urine (mL/kg/hr) 0 (0)      Other 2600      Stool 0      Total Output 2600      Net -1210 +670            Urine Occurrence 0 x 0 x     Stool Occurrence 1 x 3 x     Emesis Occurrence 0 x 0 x             SpO2: 97 %        Physical Exam  Vitals and nursing note reviewed.   Constitutional:       Appearance: Normal appearance.   HENT:      Head: Normocephalic and atraumatic.      Nose: Nose normal.   Cardiovascular:      Rate and Rhythm: Normal rate and regular rhythm.      Pulses: Normal pulses.   Pulmonary:      Effort: Pulmonary effort is normal. No respiratory distress.      Comments: Right posterior chest incision c/d/I  Right chest permacath    Abdominal:      General: Abdomen is flat. There is no distension.      Palpations: Abdomen is soft.      Tenderness: There is no abdominal tenderness.      Comments: Soft, nontender abdomen   Musculoskeletal:      Right lower leg: No edema.      Left lower leg: No edema.   Skin:     General: Skin is warm and dry.      Comments: Groin soft, dressings c/d/i   Neurological:      General: No focal deficit present.      Mental Status: She is alert.            Significant Labs:  CBC:   Recent Labs   Lab 02/04/24 0428   WBC 5.86   RBC 3.21*   HGB 9.4*   HCT 30.8*      MCV 96   MCH 29.3   MCHC 30.5*     CMP:   Recent Labs   Lab 02/04/24  0428   *   CALCIUM 8.6*   ALBUMIN 2.2*   PROT 5.7*   *   K 3.6   CO2 22*      BUN 28*   CREATININE 3.7*   ALKPHOS 94   ALT 5*   AST 12   BILITOT 0.4       Significant Diagnostics:  I have reviewed all pertinent imaging results/findings within the past 24 hours.    VTE Risk Mitigation (From admission, onward)           Ordered     heparin (porcine) injection 1,000 Units  As needed (PRN)         02/01/24 1244     heparin (porcine) injection 5,000 Units  Every 8 hours         01/29/24 0800     IP VTE HIGH RISK PATIENT  Once         01/23/24 1527     heparin 25,000 units in  dextrose 5% 250 mL (100 units/mL) infusion LOW INTENSITY nomogram - [facility]  Continuous        Question:  Begin at (units/kg/hr)  Answer:  16    01/15/24 1038     heparin (porcine) injection 1,000 Units  As needed (PRN)         01/11/24 1351     heparin (porcine) injection 1,000 Units  As needed (PRN)         01/03/24 1123     Place CARMELITA hose  Until discontinued         01/02/24 1226     Place sequential compression device  Until discontinued         01/02/24 1226

## 2024-02-04 NOTE — PLAN OF CARE
Problem: Adult Inpatient Plan of Care  Goal: Plan of Care Review  Outcome: Ongoing, Progressing  Goal: Patient-Specific Goal (Individualized)  Outcome: Ongoing, Progressing  Goal: Absence of Hospital-Acquired Illness or Injury  Outcome: Ongoing, Progressing  Goal: Optimal Comfort and Wellbeing  Outcome: Ongoing, Progressing  Goal: Readiness for Transition of Care  Outcome: Ongoing, Progressing   [facility] Plan of Care Note  Dx Malignant carcinoid tumor of lung     Shift Events weaned from 1L to RA-SpO2 93-95% ; pt ambulated the fernandez x1 with OT+ chair followed behind- walked longer distance today- still feeling weaned quickly and anxious while walking; no nausea or ABD pain today; hold 1 dose of Midodrine this afternoon since BP trending up- OK to hold the dose by  order  Goals of Care: VS and lab WDL, optimized nutrition; ambulating more often     Neuro: AAOx4     Vital Signs: stable     Respiratory: RA     Diet: 2000 kary + Novasource renal     Is patient tolerating current diet?poor- did not eat anything the whole day- had 1 can of Novasource+ 1 Smoothie brought in by family      GTTS: none     Urine Output/Bowel Movement: oliguria, had scant amount of urine this morning on the pad; 1 BM with soft brown stool     Drains/Tubes/Tube Feeds (include total output/shift): none     Lines: midline IV, HD catheter, AV fistula        Accuchecks:ac/hs     Skin: right upper back incision with Dermabond RICHARD CDI     Fall Risk Score: 17     Activity level? x2 person assist- bed to chair/BSC+ ambulated the fernandez x1; sat up on the chair most of the time     Any scheduled procedures? none     Any safety concerns? Fall risk precaution

## 2024-02-04 NOTE — PROGRESS NOTES
[facility]  Thoracic Surgery  Progress Note    Subjective:     History of Present Illness:  No notes on file    Post-Op Info:  Procedure(s) (LRB):  EGD (ESOPHAGOGASTRODUODENOSCOPY) (N/A)   9 Days Post-Op     Interval History:   NAEO  Hgb stable  Continuing to feel stronger    Medications:  Continuous Infusions:  Scheduled Meds:   [START ON 2/5/2024] sodium chloride 0.9%   Intravenous Once    acetylcysteine 100 mg/ml (10%)  4 mL Nebulization TID WAKE    cefpodoxime  200 mg Oral QHS    droNABinol  5 mg Oral BID    epoetin noble (PROCRIT) injection  4,000 Units Intravenous Every Mon, Wed, Fri    erythromycin ethylsuccinate  200 mg Oral QID (WM & HS)    fluconazole  200 mg Oral QHS    heparin (porcine)  5,000 Units Subcutaneous Q8H    LIDOcaine  1 patch Transdermal Q24H    metoprolol tartrate  12.5 mg Oral BID    metroNIDAZOLE  500 mg Oral Q12H    midodrine  10 mg Oral BID WM    oxybutynin  5 mg Oral TID    pantoprazole  40 mg Oral BID AC    potassium, sodium phosphates  2 packet Oral Once    senna-docusate 8.6-50 mg  1 tablet Oral Daily    sodium chloride 0.9%  10 mL Intravenous Q6H    venlafaxine  37.5 mg Oral Daily     PRN Meds:sodium chloride 0.9%, acetaminophen, albuterol sulfate, bisacodyL, dextrose 10%, dextrose 10%, glucagon (human recombinant), glucose, glucose, heparin (porcine), hydrALAZINE, insulin aspart U-100, midodrine, ondansetron, prochlorperazine, sodium chloride 0.9%, Flushing PICC/Midline Protocol **AND** sodium chloride 0.9% **AND** sodium chloride 0.9%     Review of patient's allergies indicates:   Allergen Reactions    Crestor [rosuvastatin] Swelling    Gluten protein      Objective:     Vital Signs (Most Recent):  Temp: 97.7 °F (36.5 °C) (02/04/24 0734)  Pulse: 79 (02/04/24 0751)  Resp: 18 (02/04/24 0751)  BP: 138/74 (02/04/24 0734)  SpO2: 97 % (02/04/24 0751) Vital Signs (24h Range):  Temp:  [97.4 °F (36.3 °C)-98.3 °F (36.8 °C)] 97.7 °F (36.5 °C)  Pulse:  [77-92] 79  Resp:  [16-20]  18  SpO2:  [94 %-100 %] 97 %  BP: (126-139)/(62-75) 138/74     Intake/Output - Last 3 Shifts         02/02 0700  02/03 0659 02/03 0700  02/04 0659 02/04 0700  02/05 0659    P.O. 1090 670     I.V. (mL/kg)       Other 300      Total Intake(mL/kg) 1390 (20.6) 670 (9.9)     Urine (mL/kg/hr) 0 (0)      Other 2600      Stool 0      Total Output 2600      Net -1210 +670            Urine Occurrence 0 x 0 x     Stool Occurrence 1 x 3 x     Emesis Occurrence 0 x 0 x             SpO2: 97 %        Physical Exam  Vitals and nursing note reviewed.   Constitutional:       Appearance: Normal appearance.   HENT:      Head: Normocephalic and atraumatic.      Nose: Nose normal.   Cardiovascular:      Rate and Rhythm: Normal rate and regular rhythm.      Pulses: Normal pulses.   Pulmonary:      Effort: Pulmonary effort is normal. No respiratory distress.      Comments: Right posterior chest incision c/d/I  Right chest permacath    Abdominal:      General: Abdomen is flat. There is no distension.      Palpations: Abdomen is soft.      Tenderness: There is no abdominal tenderness.      Comments: Soft, nontender abdomen   Musculoskeletal:      Right lower leg: No edema.      Left lower leg: No edema.   Skin:     General: Skin is warm and dry.      Comments: Groin soft, dressings c/d/i   Neurological:      General: No focal deficit present.      Mental Status: She is alert.            Significant Labs:  CBC:   Recent Labs   Lab 02/04/24  0428   WBC 5.86   RBC 3.21*   HGB 9.4*   HCT 30.8*      MCV 96   MCH 29.3   MCHC 30.5*     CMP:   Recent Labs   Lab 02/04/24  0428   *   CALCIUM 8.6*   ALBUMIN 2.2*   PROT 5.7*   *   K 3.6   CO2 22*      BUN 28*   CREATININE 3.7*   ALKPHOS 94   ALT 5*   AST 12   BILITOT 0.4       Significant Diagnostics:  I have reviewed all pertinent imaging results/findings within the past 24 hours.    VTE Risk Mitigation (From admission, onward)           Ordered     heparin (porcine) injection  1,000 Units  As needed (PRN)         02/01/24 1244     heparin (porcine) injection 5,000 Units  Every 8 hours         01/29/24 0800     IP VTE HIGH RISK PATIENT  Once         01/23/24 1527     heparin 25,000 units in dextrose 5% 250 mL (100 units/mL) infusion LOW INTENSITY nomogram - [facility]  Continuous        Question:  Begin at (units/kg/hr)  Answer:  16    01/15/24 1038     heparin (porcine) injection 1,000 Units  As needed (PRN)         01/11/24 1351     heparin (porcine) injection 1,000 Units  As needed (PRN)         01/03/24 1123     Place CARMELITA hose  Until discontinued         01/02/24 1226     Place sequential compression device  Until discontinued         01/02/24 1226                  Assessment/Plan:     * Malignant carcinoid tumor of bronchus  74 yo female with ESRD (TTS), DDD, benign essential tremor, meniere's disease, HTN, HLD, DM2, GERD, Celiac's, IBS and 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 Stepped up to the ICU 1/5 for afib RVR which was refractory to medical management and c/b development of tachy-eulogio syndrome now s/p micra placement 1/16 for tachy-eulogio syndrome in the setting of RVR. Did have brief SBO type picture 1/7 which resolved with conservative management.     - s/p R thoracotomy with R lower lobectomy for carcinoid w/ MLND; IPV injury requiring repair. CT removed post operatively.  - Refractory afib RVR requiring ICU step up 1/05, s/p chemical cardioversion initially with recurrence c/b developing tachy-eulogio syndrome    - cards and EP following, appreciate assistance            - Plan for holter monitor 30 days after discharge followed by EP outpatient follow up    - s/p TVP placement and now micra implantation 1/16    - Heparin gtt resumed 1/22; will need to transition to eliquis likely following CT removal; held 1/23 2/2 GI bleed     - Heparin ppx 1/29  - s/p R chest tube placement with IR 1/17, L chest  tube placement 1/18; L CT removed 1/25; Last R CT out 1/28  - Pulmonary toilet: IS, Acapella, Mucomyst daily; CPT ordered  - Infectious work up 2/2 leukocytosis. She remains afebrile. Effusions and compressive atelectasis also likely contributing to leukocytosis.Trended up to 30k 1/16, now downtrending. Zosyn 1/11-1/15; Meropenam 1/16 - 1/20; Plan for Cefpodoxime and metronidazole to start 1/21 per ID. 4 week abx plan for empiric treatment of presumed parapneumonic effusion - end date 2/15/2024.    - ID following, appreciate assistance. Reconsult following thora results.   - Bcx 1/11: Staph, probable contaminant  - Bcx1/12: No growth  - Rapid blood PCR: staph epi  - Sputum Cx 1/11: klebsiella & e. Coli  - R pleural fluid Cx 1/17: No growth (fungal pending still, neg KOH)  - SBO type episode 1/07 requiring NGT decompression, which resolved with conservative management   - GI bleed 1/23/2024 - heparin held, 2uPRBC, 1uFFP, heparin reversed. GI consulted                       - EGD 1/24 - could not visualize stomach much 2/2 food residue; EGD aborted                       - 2u PRBC overnight 1/25 for Hgb 6.3, slow downtrend. Repeat EGD 1/26 demonstrates non-bleeding ulcers                  - Continue protonix. GI signed off. Repeat scope in 8 weeks. F/u H pylori   - Diabetic diet diet, novasource renal BID; marinol for appetite.   - Bowel regimen, PPI  - Nephrology following, appreciate assistance. CRRT 1/17 overnight. HD trial 1/19 passed but requiring midodrine, 1.5 L LR, metoprolol for RVR. HD 1/26           - LUE AVF created 10.2023, US 1/22/2023 with good flow metrics, okay to use per vascular   - Oxybutinin for bladder spasms   - MM pain control as needed  - Continue PT/OT, mobilization, likely post hospital placement     Dispo: Tentatively planning on DC to SNF on Monday        Mike Lam MD  Thoracic Surgery  [facility]

## 2024-02-05 ENCOUNTER — CLINICAL SUPPORT (OUTPATIENT)
Dept: CARDIOLOGY | Facility: HOSPITAL | Age: 74
DRG: 163 | End: 2024-02-05
Attending: STUDENT IN AN ORGANIZED HEALTH CARE EDUCATION/TRAINING PROGRAM
Payer: MEDICARE

## 2024-02-05 LAB
ALBUMIN SERPL BCP-MCNC: 2.2 G/DL (ref 3.5–5.2)
ALP SERPL-CCNC: 95 U/L (ref 55–135)
ALT SERPL W/O P-5'-P-CCNC: 6 U/L (ref 10–44)
ANION GAP SERPL CALC-SCNC: 8 MMOL/L (ref 8–16)
AST SERPL-CCNC: 13 U/L (ref 10–40)
BASOPHILS # BLD AUTO: 0.02 K/UL (ref 0–0.2)
BASOPHILS NFR BLD: 0.3 % (ref 0–1.9)
BILIRUB SERPL-MCNC: 0.5 MG/DL (ref 0.1–1)
BUN SERPL-MCNC: 36 MG/DL (ref 8–23)
CALCIUM SERPL-MCNC: 8.8 MG/DL (ref 8.7–10.5)
CHLORIDE SERPL-SCNC: 100 MMOL/L (ref 95–110)
CO2 SERPL-SCNC: 24 MMOL/L (ref 23–29)
CREAT SERPL-MCNC: 4.3 MG/DL (ref 0.5–1.4)
DIFFERENTIAL METHOD BLD: ABNORMAL
EOSINOPHIL # BLD AUTO: 0.1 K/UL (ref 0–0.5)
EOSINOPHIL NFR BLD: 1.1 % (ref 0–8)
ERYTHROCYTE [DISTWIDTH] IN BLOOD BY AUTOMATED COUNT: 24.7 % (ref 11.5–14.5)
EST. GFR  (NO RACE VARIABLE): 10.3 ML/MIN/1.73 M^2
GLUCOSE SERPL-MCNC: 124 MG/DL (ref 70–110)
H PYLORI AG STL QL IA: NOT DETECTED
HCT VFR BLD AUTO: 30.3 % (ref 37–48.5)
HGB BLD-MCNC: 9.4 G/DL (ref 12–16)
IMM GRANULOCYTES # BLD AUTO: 0.02 K/UL (ref 0–0.04)
IMM GRANULOCYTES NFR BLD AUTO: 0.3 % (ref 0–0.5)
LYMPHOCYTES # BLD AUTO: 0.5 K/UL (ref 1–4.8)
LYMPHOCYTES NFR BLD: 7.3 % (ref 18–48)
MAGNESIUM SERPL-MCNC: 1.9 MG/DL (ref 1.6–2.6)
MCH RBC QN AUTO: 29.5 PG (ref 27–31)
MCHC RBC AUTO-ENTMCNC: 31 G/DL (ref 32–36)
MCV RBC AUTO: 95 FL (ref 82–98)
MONOCYTES # BLD AUTO: 0.8 K/UL (ref 0.3–1)
MONOCYTES NFR BLD: 12.8 % (ref 4–15)
NEUTROPHILS # BLD AUTO: 5.1 K/UL (ref 1.8–7.7)
NEUTROPHILS NFR BLD: 78.2 % (ref 38–73)
NRBC BLD-RTO: 0 /100 WBC
PHOSPHATE SERPL-MCNC: 2.1 MG/DL (ref 2.7–4.5)
PLATELET # BLD AUTO: 197 K/UL (ref 150–450)
PMV BLD AUTO: 10.7 FL (ref 9.2–12.9)
POCT GLUCOSE: 114 MG/DL (ref 70–110)
POCT GLUCOSE: 201 MG/DL (ref 70–110)
POTASSIUM SERPL-SCNC: 3.7 MMOL/L (ref 3.5–5.1)
PROT SERPL-MCNC: 6 G/DL (ref 6–8.4)
RBC # BLD AUTO: 3.19 M/UL (ref 4–5.4)
SODIUM SERPL-SCNC: 132 MMOL/L (ref 136–145)
WBC # BLD AUTO: 6.47 K/UL (ref 3.9–12.7)

## 2024-02-05 PROCEDURE — A4216 STERILE WATER/SALINE, 10 ML: HCPCS | Performed by: STUDENT IN AN ORGANIZED HEALTH CARE EDUCATION/TRAINING PROGRAM

## 2024-02-05 PROCEDURE — 94799 UNLISTED PULMONARY SVC/PX: CPT | Mod: XB

## 2024-02-05 PROCEDURE — 94640 AIRWAY INHALATION TREATMENT: CPT

## 2024-02-05 PROCEDURE — 25000242 PHARM REV CODE 250 ALT 637 W/ HCPCS

## 2024-02-05 PROCEDURE — 94668 MNPJ CHEST WALL SBSQ: CPT

## 2024-02-05 PROCEDURE — 25000003 PHARM REV CODE 250

## 2024-02-05 PROCEDURE — 25000242 PHARM REV CODE 250 ALT 637 W/ HCPCS: Performed by: STUDENT IN AN ORGANIZED HEALTH CARE EDUCATION/TRAINING PROGRAM

## 2024-02-05 PROCEDURE — 93228 REMOTE 30 DAY ECG REV/REPORT: CPT | Mod: ,,, | Performed by: STUDENT IN AN ORGANIZED HEALTH CARE EDUCATION/TRAINING PROGRAM

## 2024-02-05 PROCEDURE — 90935 HEMODIALYSIS ONE EVALUATION: CPT

## 2024-02-05 PROCEDURE — 99900035 HC TECH TIME PER 15 MIN (STAT)

## 2024-02-05 PROCEDURE — 25000003 PHARM REV CODE 250: Performed by: NURSE PRACTITIONER

## 2024-02-05 PROCEDURE — 94761 N-INVAS EAR/PLS OXIMETRY MLT: CPT

## 2024-02-05 PROCEDURE — 83735 ASSAY OF MAGNESIUM: CPT

## 2024-02-05 PROCEDURE — 20600001 HC STEP DOWN PRIVATE ROOM

## 2024-02-05 PROCEDURE — 25000003 PHARM REV CODE 250: Performed by: STUDENT IN AN ORGANIZED HEALTH CARE EDUCATION/TRAINING PROGRAM

## 2024-02-05 PROCEDURE — 99233 SBSQ HOSP IP/OBS HIGH 50: CPT | Mod: ,,, | Performed by: INTERNAL MEDICINE

## 2024-02-05 PROCEDURE — 97116 GAIT TRAINING THERAPY: CPT

## 2024-02-05 PROCEDURE — 36415 COLL VENOUS BLD VENIPUNCTURE: CPT | Performed by: STUDENT IN AN ORGANIZED HEALTH CARE EDUCATION/TRAINING PROGRAM

## 2024-02-05 PROCEDURE — 63600175 PHARM REV CODE 636 W HCPCS: Performed by: NURSE PRACTITIONER

## 2024-02-05 PROCEDURE — 63600175 PHARM REV CODE 636 W HCPCS: Performed by: STUDENT IN AN ORGANIZED HEALTH CARE EDUCATION/TRAINING PROGRAM

## 2024-02-05 PROCEDURE — 63600175 PHARM REV CODE 636 W HCPCS

## 2024-02-05 PROCEDURE — 25000003 PHARM REV CODE 250: Performed by: SURGERY

## 2024-02-05 PROCEDURE — 80053 COMPREHEN METABOLIC PANEL: CPT | Performed by: STUDENT IN AN ORGANIZED HEALTH CARE EDUCATION/TRAINING PROGRAM

## 2024-02-05 PROCEDURE — 27000221 HC OXYGEN, UP TO 24 HOURS

## 2024-02-05 PROCEDURE — 85025 COMPLETE CBC W/AUTO DIFF WBC: CPT | Performed by: STUDENT IN AN ORGANIZED HEALTH CARE EDUCATION/TRAINING PROGRAM

## 2024-02-05 PROCEDURE — 84100 ASSAY OF PHOSPHORUS: CPT

## 2024-02-05 RX ADMIN — CEFPODOXIME PROXETIL 200 MG: 200 TABLET, FILM COATED ORAL at 09:02

## 2024-02-05 RX ADMIN — ACETYLCYSTEINE 4 ML: 100 INHALANT RESPIRATORY (INHALATION) at 08:02

## 2024-02-05 RX ADMIN — METOPROLOL TARTRATE 12.5 MG: 25 TABLET, FILM COATED ORAL at 11:02

## 2024-02-05 RX ADMIN — VENLAFAXINE 37.5 MG: 37.5 TABLET ORAL at 01:02

## 2024-02-05 RX ADMIN — ALBUTEROL SULFATE 2.5 MG: 2.5 SOLUTION RESPIRATORY (INHALATION) at 01:02

## 2024-02-05 RX ADMIN — HEPARIN SODIUM 5000 UNITS: 5000 INJECTION INTRAVENOUS; SUBCUTANEOUS at 05:02

## 2024-02-05 RX ADMIN — Medication 10 ML: at 02:02

## 2024-02-05 RX ADMIN — ACETYLCYSTEINE 4 ML: 100 INHALANT RESPIRATORY (INHALATION) at 09:02

## 2024-02-05 RX ADMIN — ONDANSETRON 8 MG: 2 INJECTION INTRAMUSCULAR; INTRAVENOUS at 08:02

## 2024-02-05 RX ADMIN — ERYTHROMYCIN ETHYLSUCCINATE 200 MG: 200 SUSPENSION ORAL at 04:02

## 2024-02-05 RX ADMIN — ALBUTEROL SULFATE 2.5 MG: 2.5 SOLUTION RESPIRATORY (INHALATION) at 08:02

## 2024-02-05 RX ADMIN — PANTOPRAZOLE SODIUM 40 MG: 40 TABLET, DELAYED RELEASE ORAL at 05:02

## 2024-02-05 RX ADMIN — DRONABINOL 5 MG: 2.5 CAPSULE ORAL at 09:02

## 2024-02-05 RX ADMIN — DRONABINOL 5 MG: 2.5 CAPSULE ORAL at 01:02

## 2024-02-05 RX ADMIN — SODIUM CHLORIDE: 9 INJECTION, SOLUTION INTRAVENOUS at 08:02

## 2024-02-05 RX ADMIN — METOPROLOL TARTRATE 12.5 MG: 25 TABLET, FILM COATED ORAL at 01:02

## 2024-02-05 RX ADMIN — SENNOSIDES AND DOCUSATE SODIUM 1 TABLET: 8.6; 5 TABLET ORAL at 01:02

## 2024-02-05 RX ADMIN — ERYTHROMYCIN ETHYLSUCCINATE 200 MG: 200 SUSPENSION ORAL at 01:02

## 2024-02-05 RX ADMIN — OXYBUTYNIN CHLORIDE 5 MG: 5 TABLET ORAL at 01:02

## 2024-02-05 RX ADMIN — ACETAMINOPHEN 650 MG: 325 TABLET ORAL at 03:02

## 2024-02-05 RX ADMIN — OXYBUTYNIN CHLORIDE 5 MG: 5 TABLET ORAL at 09:02

## 2024-02-05 RX ADMIN — HEPARIN SODIUM 1000 UNITS: 1000 INJECTION, SOLUTION INTRAVENOUS; SUBCUTANEOUS at 10:02

## 2024-02-05 RX ADMIN — ALBUTEROL SULFATE 2.5 MG: 2.5 SOLUTION RESPIRATORY (INHALATION) at 09:02

## 2024-02-05 RX ADMIN — HEPARIN SODIUM 5000 UNITS: 5000 INJECTION INTRAVENOUS; SUBCUTANEOUS at 09:02

## 2024-02-05 RX ADMIN — Medication 10 ML: at 06:02

## 2024-02-05 RX ADMIN — MIDODRINE HYDROCHLORIDE 10 MG: 5 TABLET ORAL at 04:02

## 2024-02-05 RX ADMIN — HEPARIN SODIUM 5000 UNITS: 5000 INJECTION INTRAVENOUS; SUBCUTANEOUS at 01:02

## 2024-02-05 RX ADMIN — LIDOCAINE 5% 1 PATCH: 700 PATCH TOPICAL at 02:02

## 2024-02-05 RX ADMIN — ERYTHROMYCIN ETHYLSUCCINATE 200 MG: 200 SUSPENSION ORAL at 09:02

## 2024-02-05 RX ADMIN — INSULIN ASPART 4 UNITS: 100 INJECTION, SOLUTION INTRAVENOUS; SUBCUTANEOUS at 04:02

## 2024-02-05 RX ADMIN — FLUCONAZOLE 200 MG: 200 TABLET ORAL at 09:02

## 2024-02-05 RX ADMIN — METRONIDAZOLE 500 MG: 500 TABLET ORAL at 01:02

## 2024-02-05 RX ADMIN — PANTOPRAZOLE SODIUM 40 MG: 40 TABLET, DELAYED RELEASE ORAL at 04:02

## 2024-02-05 RX ADMIN — METRONIDAZOLE 500 MG: 500 TABLET ORAL at 09:02

## 2024-02-05 RX ADMIN — ACETYLCYSTEINE 4 ML: 100 INHALANT RESPIRATORY (INHALATION) at 01:02

## 2024-02-05 NOTE — ASSESSMENT & PLAN NOTE
Recent Labs   Lab 02/03/24  0438 02/04/24  0428 02/05/24  0319   WBC 5.81 5.86 6.47   HGB 9.7* 9.4* 9.4*   HCT 30.8* 30.8* 30.3*   * 161 197         - Target Hgb 10-11 gm/dL. Hgb 8.0.  - Continue EPO

## 2024-02-05 NOTE — NURSING
Report received from TARUN Ferguson RN.     Patient arrived to [facility] via stretcher. Awake  and alert.    HD Tx initiated via Right tunneled catheter.     Patient nauseated, Ondansetron given as ordered.

## 2024-02-05 NOTE — NURSING
[facility] Plan of Care Note  Dx: Carcinoid tumor of bronchus     Shift Events: medicated for pain x1, increased confusion     Goals of Care: fall free, no skin breakdown     Neuro: A&Ox4     Vital Signs: WDL     Respiratory: 2L NC     Diet: diabetic     Is patient tolerating current diet? yes     GTTS: none     Urine Output/Bowel Movement: anuric/ no bm     Drains/Tubes/Tube Feeds (include total output/shift): none     Lines: ML R, AV fistula L, HD R IJ        Accuchecks:ACHS     Skin: incision     Fall Risk Score: see flowsheet     Activity level? See flowsheet     Any scheduled procedures? none     Any safety concerns? Falls, skin breakdown     Other: none

## 2024-02-05 NOTE — SUBJECTIVE & OBJECTIVE
Interval History: plan for HD today  Medications:  Continuous Infusions:  Scheduled Meds:   sodium chloride 0.9%   Intravenous Once    acetylcysteine 100 mg/ml (10%)  4 mL Nebulization TID WAKE    cefpodoxime  200 mg Oral QHS    droNABinol  5 mg Oral BID    epoetin noble (PROCRIT) injection  4,000 Units Intravenous Every Mon, Wed, Fri    erythromycin ethylsuccinate  200 mg Oral QID (WM & HS)    fluconazole  200 mg Oral QHS    heparin (porcine)  5,000 Units Subcutaneous Q8H    LIDOcaine  1 patch Transdermal Q24H    metoprolol tartrate  12.5 mg Oral BID    metroNIDAZOLE  500 mg Oral Q12H    midodrine  10 mg Oral BID WM    oxybutynin  5 mg Oral TID    pantoprazole  40 mg Oral BID AC    senna-docusate 8.6-50 mg  1 tablet Oral Daily    sodium chloride 0.9%  10 mL Intravenous Q6H    venlafaxine  37.5 mg Oral Daily     PRN Meds:sodium chloride 0.9%, acetaminophen, albuterol sulfate, bisacodyL, dextrose 10%, dextrose 10%, glucagon (human recombinant), glucose, glucose, heparin (porcine), hydrALAZINE, insulin aspart U-100, midodrine, ondansetron, prochlorperazine, sodium chloride 0.9%, Flushing PICC/Midline Protocol **AND** sodium chloride 0.9% **AND** sodium chloride 0.9%     Review of patient's allergies indicates:   Allergen Reactions    Crestor [rosuvastatin] Swelling    Gluten protein      Objective:     Vital Signs (Most Recent):  Temp: 97.9 °F (36.6 °C) (02/05/24 0500)  Pulse: 93 (02/05/24 0802)  Resp: 18 (02/05/24 0802)  BP: 130/70 (02/05/24 0619)  SpO2: (!) 92 % (02/05/24 0500) Vital Signs (24h Range):  Temp:  [97.7 °F (36.5 °C)-98 °F (36.7 °C)] 97.9 °F (36.6 °C)  Pulse:  [78-93] 93  Resp:  [18-20] 18  SpO2:  [91 %-97 %] 92 %  BP: (121-157)/(67-83) 130/70     Weight: 67.6 kg (149 lb)  Body mass index is 24.79 kg/m².    Intake/Output - Last 3 Shifts         02/03 0700  02/04 0659 02/04 0700 02/05 0659 02/05 0700  02/06 0659    P.O. 670 850     Other       Total Intake(mL/kg) 670 (9.9) 850 (12.6)     Urine (mL/kg/hr)        Other       Stool       Total Output       Net +670 +850            Urine Occurrence 0 x 1 x     Stool Occurrence 3 x 2 x     Emesis Occurrence 0 x 0 x              Physical Exam  Vitals and nursing note reviewed.   Constitutional:       Appearance: Normal appearance.   HENT:      Head: Normocephalic and atraumatic.      Nose: Nose normal.   Cardiovascular:      Rate and Rhythm: Normal rate and regular rhythm.      Pulses: Normal pulses.   Pulmonary:      Effort: Pulmonary effort is normal. No respiratory distress.      Comments: Right posterior chest incision c/d/I  Right chest permacath    Abdominal:      General: Abdomen is flat. There is no distension.      Palpations: Abdomen is soft.      Tenderness: There is no abdominal tenderness.      Comments: Soft, nontender abdomen   Musculoskeletal:      Right lower leg: Edema present.      Left lower leg: Edema present.   Skin:     General: Skin is warm and dry.      Coloration: Skin is pale.      Comments: Groin soft, dressings c/d/i   Neurological:      General: No focal deficit present.      Mental Status: She is alert.          Significant Labs:  I have reviewed all pertinent lab results within the past 24 hours.  CBC:   Recent Labs   Lab 02/05/24  0319   WBC 6.47   RBC 3.19*   HGB 9.4*   HCT 30.3*      MCV 95   MCH 29.5   MCHC 31.0*       CMP:   Recent Labs   Lab 02/05/24  0319   *   CALCIUM 8.8   ALBUMIN 2.2*   PROT 6.0   *   K 3.7   CO2 24      BUN 36*   CREATININE 4.3*   ALKPHOS 95   ALT 6*   AST 13   BILITOT 0.5         Significant Diagnostics:  I have reviewed all pertinent imaging results/findings within the past 24 hours.

## 2024-02-05 NOTE — PROGRESS NOTES
[facility]  Nephrology  Progress Note    Patient Name: Lily Green  MRN: [id number]  Admission Date: 1/2/2024  Hospital Length of Stay: 34 days  Attending Provider: Tan Thomson MD   Primary Care Physician: Pavel Calixto MD  Principal Problem:Malignant carcinoid tumor of bronchus    Subjective:     HPI: Patient is a 73 y.o. 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 S/p thoracotomy. Last HD prior to presentation 1/1/24. Nephrology consulted for ESRD on HD.        Interval History: plan for HD today  Medications:  Continuous Infusions:  Scheduled Meds:   sodium chloride 0.9%   Intravenous Once    acetylcysteine 100 mg/ml (10%)  4 mL Nebulization TID WAKE    cefpodoxime  200 mg Oral QHS    droNABinol  5 mg Oral BID    epoetin noble (PROCRIT) injection  4,000 Units Intravenous Every Mon, Wed, Fri    erythromycin ethylsuccinate  200 mg Oral QID (WM & HS)    fluconazole  200 mg Oral QHS    heparin (porcine)  5,000 Units Subcutaneous Q8H    LIDOcaine  1 patch Transdermal Q24H    metoprolol tartrate  12.5 mg Oral BID    metroNIDAZOLE  500 mg Oral Q12H    midodrine  10 mg Oral BID WM    oxybutynin  5 mg Oral TID    pantoprazole  40 mg Oral BID AC    senna-docusate 8.6-50 mg  1 tablet Oral Daily    sodium chloride 0.9%  10 mL Intravenous Q6H    venlafaxine  37.5 mg Oral Daily     PRN Meds:sodium chloride 0.9%, acetaminophen, albuterol sulfate, bisacodyL, dextrose 10%, dextrose 10%, glucagon (human recombinant), glucose, glucose, heparin (porcine), hydrALAZINE, insulin aspart U-100, midodrine, ondansetron, prochlorperazine, sodium chloride 0.9%, Flushing PICC/Midline Protocol **AND** sodium chloride 0.9% **AND** sodium chloride 0.9%     Review of patient's allergies indicates:   Allergen Reactions    Crestor [rosuvastatin] Swelling    Gluten protein      Objective:     Vital Signs  (Most Recent):  Temp: 97.9 °F (36.6 °C) (02/05/24 0500)  Pulse: 93 (02/05/24 0802)  Resp: 18 (02/05/24 0802)  BP: 130/70 (02/05/24 0619)  SpO2: (!) 92 % (02/05/24 0500) Vital Signs (24h Range):  Temp:  [97.7 °F (36.5 °C)-98 °F (36.7 °C)] 97.9 °F (36.6 °C)  Pulse:  [78-93] 93  Resp:  [18-20] 18  SpO2:  [91 %-97 %] 92 %  BP: (121-157)/(67-83) 130/70     Weight: 67.6 kg (149 lb)  Body mass index is 24.79 kg/m².    Intake/Output - Last 3 Shifts         02/03 0700  02/04 0659 02/04 0700  02/05 0659 02/05 0700  02/06 0659    P.O. 670 850     Other       Total Intake(mL/kg) 670 (9.9) 850 (12.6)     Urine (mL/kg/hr)       Other       Stool       Total Output       Net +670 +850            Urine Occurrence 0 x 1 x     Stool Occurrence 3 x 2 x     Emesis Occurrence 0 x 0 x             Physical Exam  Vitals and nursing note reviewed.   Constitutional:       Appearance: Normal appearance.   HENT:      Head: Normocephalic and atraumatic.      Nose: Nose normal.   Cardiovascular:      Rate and Rhythm: Normal rate and regular rhythm.      Pulses: Normal pulses.   Pulmonary:      Effort: Pulmonary effort is normal. No respiratory distress.      Comments: Right posterior chest incision c/d/I  Right chest permacath    Abdominal:      General: Abdomen is flat. There is no distension.      Palpations: Abdomen is soft.      Tenderness: There is no abdominal tenderness.      Comments: Soft, nontender abdomen   Musculoskeletal:      Right lower leg: Edema present.      Left lower leg: Edema present.   Skin:     General: Skin is warm and dry.      Coloration: Skin is pale.      Comments: Groin soft, dressings c/d/i   Neurological:      General: No focal deficit present.      Mental Status: She is alert.          Significant Labs:  I have reviewed all pertinent lab results within the past 24 hours.  CBC:   Recent Labs   Lab 02/05/24  0319   WBC 6.47   RBC 3.19*   HGB 9.4*   HCT 30.3*      MCV 95   MCH 29.5   MCHC 31.0*       CMP:    Recent Labs   Lab 02/05/24  0319   *   CALCIUM 8.8   ALBUMIN 2.2*   PROT 6.0   *   K 3.7   CO2 24      BUN 36*   CREATININE 4.3*   ALKPHOS 95   ALT 6*   AST 13   BILITOT 0.5         Significant Diagnostics:  I have reviewed all pertinent imaging results/findings within the past 24 hours.  Assessment/Plan:     Renal/  ESRD (end stage renal disease)  S/p R thoracotomy due to RML carcinoid tumor. On HD ~ 1 year. Last HD prior to presentation 1/1/24. Pt has NELY AVF placed on 10/23 that has not been cleared by vascular. Currently using RIJ TDC.     During her admission: stepped up to ICU for Afib with RVR. Was started on BB, amio and digoxin. However 1/12/24, had multiple episodes of eulogio with few asystole requiring chest compressions. Digoxin level elevated and dig-meaghan was given with resulting decrease in levels.     Nephrology History  iHD Schedule: TTS   Unit/MD: [facility] Hinds/ Dr. Vyas  Duration: 3.5 hours   EDW: 58 kg   Access: RIJ TDC  Residual Renal Function: minimal     Assessment:     -Plan for HD today, for clearance and volume management.   -Strict ins and outs  -Avoid nephrotoxic agents if possible and renally dose medications  -Avoid drastic hemodynamic changes if possible      Oncology  Anemia in ESRD (end-stage renal disease)  Recent Labs   Lab 02/03/24  0438 02/04/24  0428 02/05/24  0319   WBC 5.81 5.86 6.47   HGB 9.7* 9.4* 9.4*   HCT 30.8* 30.8* 30.3*   * 161 197         - Target Hgb 10-11 gm/dL. Hgb 8.0.  - Continue EPO        Thank you for your consult. I will follow-up with patient. Please contact us if you have any additional questions.    Abdi Alexis MD  Nephrology  [facility]

## 2024-02-05 NOTE — NURSING
3 hours HD tx completed. 2 L of fluid removed.  Patient tolerated well.    Blood returned. Lines flushed. Catheter locked with Heparin. Clamped, capped and wrapped with sterile gauze.    HD catheter dressing changed.     @ 1255.    Report given to TARUN Ferguson RN.

## 2024-02-05 NOTE — PROGRESS NOTES
Timbo Collins notified pt's discharge delayed. Pt now anticipated to start outpt dialysis at [facility] Wednesday (02/07/2024).    SW will continue to follow for pt's discharge.    Kathy Agustin LMSW  [facility]  [phone]

## 2024-02-05 NOTE — SUBJECTIVE & OBJECTIVE
Interval History:   NAEO  Some LE neuropathy this morning--chronic issue at baseline severity   Potential DC to SNF today    Medications:  Continuous Infusions:  Scheduled Meds:   sodium chloride 0.9%   Intravenous Once    acetylcysteine 100 mg/ml (10%)  4 mL Nebulization TID WAKE    cefpodoxime  200 mg Oral QHS    droNABinol  5 mg Oral BID    epoetin noble (PROCRIT) injection  4,000 Units Intravenous Every Mon, Wed, Fri    erythromycin ethylsuccinate  200 mg Oral QID (WM & HS)    fluconazole  200 mg Oral QHS    heparin (porcine)  5,000 Units Subcutaneous Q8H    LIDOcaine  1 patch Transdermal Q24H    metoprolol tartrate  12.5 mg Oral BID    metroNIDAZOLE  500 mg Oral Q12H    midodrine  10 mg Oral BID WM    oxybutynin  5 mg Oral TID    pantoprazole  40 mg Oral BID AC    senna-docusate 8.6-50 mg  1 tablet Oral Daily    sodium chloride 0.9%  10 mL Intravenous Q6H    venlafaxine  37.5 mg Oral Daily     PRN Meds:sodium chloride 0.9%, acetaminophen, albuterol sulfate, bisacodyL, dextrose 10%, dextrose 10%, glucagon (human recombinant), glucose, glucose, heparin (porcine), hydrALAZINE, insulin aspart U-100, midodrine, ondansetron, prochlorperazine, sodium chloride 0.9%, Flushing PICC/Midline Protocol **AND** sodium chloride 0.9% **AND** sodium chloride 0.9%     Review of patient's allergies indicates:   Allergen Reactions    Crestor [rosuvastatin] Swelling    Gluten protein      Objective:     Vital Signs (Most Recent):  Temp: 97.9 °F (36.6 °C) (02/05/24 0500)  Pulse: 84 (02/05/24 0619)  Resp: 18 (02/05/24 0500)  BP: 130/70 (02/05/24 0619)  SpO2: (!) 92 % (02/05/24 0500) Vital Signs (24h Range):  Temp:  [97.7 °F (36.5 °C)-98 °F (36.7 °C)] 97.9 °F (36.6 °C)  Pulse:  [78-91] 84  Resp:  [18-20] 18  SpO2:  [91 %-97 %] 92 %  BP: (121-157)/(67-83) 130/70     Intake/Output - Last 3 Shifts         02/03 0700  02/04 0659 02/04 0700  02/05 0659    P.O. 670 850    Total Intake(mL/kg) 670 (9.9) 850 (12.6)    Net +670 +850           Urine Occurrence 0 x 1 x    Stool Occurrence 3 x 2 x    Emesis Occurrence 0 x 0 x            SpO2: (!) 92 %        Physical Exam  Vitals and nursing note reviewed.   Constitutional:       Appearance: Normal appearance.   HENT:      Head: Normocephalic and atraumatic.      Nose: Nose normal.   Cardiovascular:      Rate and Rhythm: Normal rate and regular rhythm.      Pulses: Normal pulses.   Pulmonary:      Effort: Pulmonary effort is normal. No respiratory distress.      Comments: Right posterior chest incision c/d/I  Right chest permacath    Abdominal:      General: Abdomen is flat. There is no distension.      Palpations: Abdomen is soft.      Tenderness: There is no abdominal tenderness.      Comments: Soft, nontender abdomen   Musculoskeletal:      Right lower leg: No edema.      Left lower leg: No edema.   Skin:     General: Skin is warm and dry.      Comments: Groin soft, dressings c/d/i   Neurological:      General: No focal deficit present.      Mental Status: She is alert.            Significant Labs:  CBC:   Recent Labs   Lab 02/05/24  0319   WBC 6.47   RBC 3.19*   HGB 9.4*   HCT 30.3*      MCV 95   MCH 29.5   MCHC 31.0*     CMP:   Recent Labs   Lab 02/05/24  0319   *   CALCIUM 8.8   ALBUMIN 2.2*   PROT 6.0   *   K 3.7   CO2 24      BUN 36*   CREATININE 4.3*   ALKPHOS 95   ALT 6*   AST 13   BILITOT 0.5       Significant Diagnostics:  I have reviewed all pertinent imaging results/findings within the past 24 hours.    VTE Risk Mitigation (From admission, onward)           Ordered     heparin (porcine) injection 1,000 Units  As needed (PRN)         02/01/24 1244     heparin (porcine) injection 5,000 Units  Every 8 hours         01/29/24 0800     IP VTE HIGH RISK PATIENT  Once         01/23/24 1527     heparin 25,000 units in dextrose 5% 250 mL (100 units/mL) infusion LOW INTENSITY nomogram - [facility]  Continuous        Question:  Begin at (units/kg/hr)  Answer:  16    01/15/24 1038      heparin (porcine) injection 1,000 Units  As needed (PRN)         01/11/24 1351     heparin (porcine) injection 1,000 Units  As needed (PRN)         01/03/24 1123     Place CARMELITA hose  Until discontinued         01/02/24 1226     Place sequential compression device  Until discontinued         01/02/24 1226

## 2024-02-05 NOTE — PT/OT/SLP PROGRESS
Physical Therapy Treatment    Patient Name:  Lily Green   MRN:  [id number]    Recommendations:     Discharge Recommendations: Moderate Intensity Therapy  Discharge Equipment Recommendations:  (will determine DME needs closer to discharge)  Barriers to discharge: Decreased caregiver support    Assessment:     Lily Green is a 73 y.o. female admitted with a medical diagnosis of Malignant carcinoid tumor of bronchus.  She presents with the following impairments/functional limitations: impaired balance, decreased safety awareness, impaired endurance, impaired functional mobility, gait instability pt tolerated treatment well but going to dialysis limited functional mobility. Pt will benefit from cont skilled PT 4x/wk to progress physically.  Pt is significantly below previous functional level, increased risk of falls and increased burden of care currently. Patient continues to demonstrate the need for moderate intensity therapy on a daily basis post acute exhibited by decreased independence with functional mobility.    Rehab Prognosis: Good; patient would benefit from acute skilled PT services to address these deficits and reach maximum level of function.    Recent Surgery: Procedure(s) (LRB):  EGD (ESOPHAGOGASTRODUODENOSCOPY) (N/A) 10 Days Post-Op    Plan:     During this hospitalization, patient to be seen 4 x/week to address the identified rehab impairments via gait training, therapeutic activities, therapeutic exercises, neuromuscular re-education and progress toward the following goals:    Plan of Care Expires:  02/09/24    Subjective     Chief Complaint: pt had no complaints during treatment.   Patient/Family Comments/goals: to get better and go home.   Pain/Comfort:  Pain Rating 1: 0/10  Pain Rating Post-Intervention 1: 0/10      Objective:     Communicated with nurse  prior to session.  Patient found supine with telemetry (hep lock IV) upon PT entry to room.     General Precautions:  Standard, fall  Orthopedic Precautions: N/A  Braces: N/A  Respiratory Status: Room air     Functional Mobility:  Bed Mobility:  pt needed verbal cues for hand placement and sequencing for functional mobility.    Rolling Right: minimum assistance  Supine to Sit: minimum assistance head of bed elevated    Transfers:     Sit to Stand:  minimum assistance with rolling walker from bed and moderate assist with RW from bedside chair.     Gait: pt received gait training ~ 18 ft with RW and CGA. Distance gait trained limited by dialysis coming to get pt.     Balance: pt sat on EOB with SBA.     Pt white board updated with current therapists name and level of mobility assistance needed.       AM-PAC 6 CLICK MOBILITY  Turning over in bed (including adjusting bedclothes, sheets and blankets)?: 3  Sitting down on and standing up from a chair with arms (e.g., wheelchair, bedside commode, etc.): 2  Moving from lying on back to sitting on the side of the bed?: 3  Moving to and from a bed to a chair (including a wheelchair)?: 3  Need to walk in hospital room?: 3  Climbing 3-5 steps with a railing?: 1  Basic Mobility Total Score: 15       Treatment & Education:  Pt and paid sitter received verbal instructions in PT POC and both verbally expressed understanding of such.     Patient left  on stretcher going to dialysis  with  transport  present..    GOALS:   Multidisciplinary Problems       Physical Therapy Goals          Problem: Physical Therapy    Goal Priority Disciplines Outcome Goal Variances Interventions   Physical Therapy Goal     PT, PT/OT Ongoing, Progressing     Description: PT goals until 2/9/24    1. Pt supine to sit with supervision-not met  2. Pt sit to supine with supervision-not met  3. Pt sit to stand with AD if needed with supervision-not met  4. Pt to perform gait 150 ft with AD if needed with supervision.-not met  5. Pt to go up/down curb step with AD if needed with CGA.-not met                         Time  Tracking:     PT Received On: 02/05/24  PT Start Time: 0811     PT Stop Time: 0819  PT Total Time (min): 8 min     Billable Minutes: Gait Training 8 min     Treatment Type: Treatment  PT/PTA: PT     Number of PTA visits since last PT visit: 0     02/05/2024

## 2024-02-05 NOTE — PT/OT/SLP PROGRESS
Occupational Therapy      Patient Name:  Lily Green   MRN:  [id number]    Patient not seen today secondary to Off the floor for dialysis in AM, and Nurse/ ALYSSIA hold in PM. Will follow-up .    2/5/2024

## 2024-02-05 NOTE — CARE UPDATE
-Glucose Goal 140-180    -A1C:   Hemoglobin A1C   Date Value Ref Range Status   11/15/2023 4.9 4.0 - 5.6 % Final     Comment:     ADA Screening Guidelines:  5.7-6.4%  Consistent with prediabetes  >or=6.5%  Consistent with diabetes    High levels of fetal hemoglobin interfere with the HbA1C  assay. Heterozygous hemoglobin variants (HbS, HgC, etc)do  not significantly interfere with this assay.   However, presence of multiple variants may affect accuracy.     10/23/2019 6.8 % Final         -HOME REGIMEN:   LDC SSI   Blood sugar 150 to 200, + 1 unit  Blood sugar 201 to 250, + 2 units  Blood sugar 251 to 300, + 3 units  Blood sugar 301 to 350, + 4 units   Blood sugar greater than 350, + 5 units      -GLUCOSE TREND FOR THE PAST 24HRS:   Recent Labs   Lab 02/03/24  0715 02/03/24  1118 02/03/24  1513 02/04/24  0736 02/04/24  1133 02/04/24  1640   POCTGLUCOSE 157* 197* 171* 130* 123* 211*           -NO HYPOGYCEMIAS NOTED     - Diet  Diet [place] Gluten Free     novasource renal BID; marinol for appetite.     -TOLERATING 0-25 % OF PO DIET.       Plan:   - Continue Novolog Mod dose correction with ISF 25 starting at 150 prn   - POCT Glucose before meals and at bedtime   - Hypoglycemia protocol in place      ** Please notify Endocrine for any change and/or advance in diet**  ** Please call Endocrine for any BG related issues **     Discharge Planning:   TBD. Please notify endocrinology prior to discharge. Can resume home regimen.

## 2024-02-05 NOTE — PROGRESS NOTES
[facility]  Thoracic Surgery  Progress Note    Subjective:     History of Present Illness:  No notes on file    Post-Op Info:  Procedure(s) (LRB):  EGD (ESOPHAGOGASTRODUODENOSCOPY) (N/A)   10 Days Post-Op     Interval History:   NAEO  Some LE neuropathy this morning--chronic issue at baseline severity   Potential DC to SNF today    Medications:  Continuous Infusions:  Scheduled Meds:   sodium chloride 0.9%   Intravenous Once    acetylcysteine 100 mg/ml (10%)  4 mL Nebulization TID WAKE    cefpodoxime  200 mg Oral QHS    droNABinol  5 mg Oral BID    epoetin noble (PROCRIT) injection  4,000 Units Intravenous Every Mon, Wed, Fri    erythromycin ethylsuccinate  200 mg Oral QID (WM & HS)    fluconazole  200 mg Oral QHS    heparin (porcine)  5,000 Units Subcutaneous Q8H    LIDOcaine  1 patch Transdermal Q24H    metoprolol tartrate  12.5 mg Oral BID    metroNIDAZOLE  500 mg Oral Q12H    midodrine  10 mg Oral BID WM    oxybutynin  5 mg Oral TID    pantoprazole  40 mg Oral BID AC    senna-docusate 8.6-50 mg  1 tablet Oral Daily    sodium chloride 0.9%  10 mL Intravenous Q6H    venlafaxine  37.5 mg Oral Daily     PRN Meds:sodium chloride 0.9%, acetaminophen, albuterol sulfate, bisacodyL, dextrose 10%, dextrose 10%, glucagon (human recombinant), glucose, glucose, heparin (porcine), hydrALAZINE, insulin aspart U-100, midodrine, ondansetron, prochlorperazine, sodium chloride 0.9%, Flushing PICC/Midline Protocol **AND** sodium chloride 0.9% **AND** sodium chloride 0.9%     Review of patient's allergies indicates:   Allergen Reactions    Crestor [rosuvastatin] Swelling    Gluten protein      Objective:     Vital Signs (Most Recent):  Temp: 97.9 °F (36.6 °C) (02/05/24 0500)  Pulse: 84 (02/05/24 0619)  Resp: 18 (02/05/24 0500)  BP: 130/70 (02/05/24 0619)  SpO2: (!) 92 % (02/05/24 0500) Vital Signs (24h Range):  Temp:  [97.7 °F (36.5 °C)-98 °F (36.7 °C)] 97.9 °F (36.6 °C)  Pulse:  [78-91] 84  Resp:  [18-20] 18  SpO2:  [91 %-97  %] 92 %  BP: (121-157)/(67-83) 130/70     Intake/Output - Last 3 Shifts         02/03 0700  02/04 0659 02/04 0700  02/05 0659    P.O. 670 850    Total Intake(mL/kg) 670 (9.9) 850 (12.6)    Net +670 +850          Urine Occurrence 0 x 1 x    Stool Occurrence 3 x 2 x    Emesis Occurrence 0 x 0 x            SpO2: (!) 92 %        Physical Exam  Vitals and nursing note reviewed.   Constitutional:       Appearance: Normal appearance.   HENT:      Head: Normocephalic and atraumatic.      Nose: Nose normal.   Cardiovascular:      Rate and Rhythm: Normal rate and regular rhythm.      Pulses: Normal pulses.   Pulmonary:      Effort: Pulmonary effort is normal. No respiratory distress.      Comments: Right posterior chest incision c/d/I  Right chest permacath    Abdominal:      General: Abdomen is flat. There is no distension.      Palpations: Abdomen is soft.      Tenderness: There is no abdominal tenderness.      Comments: Soft, nontender abdomen   Musculoskeletal:      Right lower leg: No edema.      Left lower leg: No edema.   Skin:     General: Skin is warm and dry.      Comments: Groin soft, dressings c/d/i   Neurological:      General: No focal deficit present.      Mental Status: She is alert.            Significant Labs:  CBC:   Recent Labs   Lab 02/05/24  0319   WBC 6.47   RBC 3.19*   HGB 9.4*   HCT 30.3*      MCV 95   MCH 29.5   MCHC 31.0*     CMP:   Recent Labs   Lab 02/05/24  0319   *   CALCIUM 8.8   ALBUMIN 2.2*   PROT 6.0   *   K 3.7   CO2 24      BUN 36*   CREATININE 4.3*   ALKPHOS 95   ALT 6*   AST 13   BILITOT 0.5       Significant Diagnostics:  I have reviewed all pertinent imaging results/findings within the past 24 hours.    VTE Risk Mitigation (From admission, onward)           Ordered     heparin (porcine) injection 1,000 Units  As needed (PRN)         02/01/24 1244     heparin (porcine) injection 5,000 Units  Every 8 hours         01/29/24 0800     IP VTE HIGH RISK PATIENT  Once          01/23/24 1527     heparin 25,000 units in dextrose 5% 250 mL (100 units/mL) infusion LOW INTENSITY nomogram - [facility]  Continuous        Question:  Begin at (units/kg/hr)  Answer:  16    01/15/24 1038     heparin (porcine) injection 1,000 Units  As needed (PRN)         01/11/24 1351     heparin (porcine) injection 1,000 Units  As needed (PRN)         01/03/24 1123     Place CARMELITA hose  Until discontinued         01/02/24 1226     Place sequential compression device  Until discontinued         01/02/24 1226                  Assessment/Plan:     * Malignant carcinoid tumor of bronchus  74 yo female with ESRD (TTS), DDD, benign essential tremor, meniere's disease, HTN, HLD, DM2, GERD, Celiac's, IBS and 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 Stepped up to the ICU 1/5 for afib RVR which was refractory to medical management and c/b development of tachy-eulogio syndrome now s/p micra placement 1/16 for tachy-eulogio syndrome in the setting of RVR. Did have brief SBO type picture 1/7 which resolved with conservative management.     - s/p R thoracotomy with R lower lobectomy for carcinoid w/ MLND; IPV injury requiring repair. CT removed post operatively.  - Refractory afib RVR requiring ICU step up 1/05, s/p chemical cardioversion initially with recurrence c/b developing tachy-eulogio syndrome    - cards and EP following, appreciate assistance            - Plan for holter monitor 30 days after discharge followed by EP outpatient follow up    - s/p TVP placement and now micra implantation 1/16    - Heparin gtt resumed 1/22; will need to transition to eliquis likely following CT removal; held 1/23 2/2 GI bleed     - Heparin ppx 1/29  - s/p R chest tube placement with IR 1/17, L chest tube placement 1/18; L CT removed 1/25; Last R CT out 1/28  - Pulmonary toilet: IS, Acapella, Mucomyst daily; CPT ordered  - Infectious work up 2/2 leukocytosis. She  remains afebrile. Effusions and compressive atelectasis also likely contributing to leukocytosis.Trended up to 30k 1/16, now downtrending. Zosyn 1/11-1/15; Meropenam 1/16 - 1/20; Plan for Cefpodoxime and metronidazole to start 1/21 per ID. 4 week abx plan for empiric treatment of presumed parapneumonic effusion - end date 2/15/2024.    - ID following, appreciate assistance. Reconsult following thora results.   - Bcx 1/11: Staph, probable contaminant  - Bcx1/12: No growth  - Rapid blood PCR: staph epi  - Sputum Cx 1/11: klebsiella & e. Coli  - R pleural fluid Cx 1/17: No growth (fungal pending still, neg KOH)  - SBO type episode 1/07 requiring NGT decompression, which resolved with conservative management   - GI bleed 1/23/2024 - heparin held, 2uPRBC, 1uFFP, heparin reversed. GI consulted                       - EGD 1/24 - could not visualize stomach much 2/2 food residue; EGD aborted                       - 2u PRBC overnight 1/25 for Hgb 6.3, slow downtrend. Repeat EGD 1/26 demonstrates non-bleeding ulcers                  - Continue protonix. GI signed off. Repeat scope in 8 weeks. F/u H pylori   - Diabetic diet diet, novasource renal BID; marinol for appetite.   - Bowel regimen, PPI  - Nephrology following, appreciate assistance. CRRT 1/17 overnight. HD trial 1/19 passed but requiring midodrine, 1.5 L LR, metoprolol for RVR. HD 1/26           - LUE AVF created 10.2023, US 1/22/2023 with good flow metrics, okay to use per vascular   - Oxybutinin for bladder spasms   - MM pain control as needed  - Continue PT/OT, mobilization, likely post hospital placement     Dispo: Tentatively planning on DC to SNF today. Will need to call for Holter monitor prior to DC        Mike Lam MD  Thoracic Surgery  [facility]

## 2024-02-05 NOTE — PLAN OF CARE
Problem: Physical Therapy  Goal: Physical Therapy Goal  Description: PT goals until 2/9/24    1. Pt supine to sit with supervision-not met  2. Pt sit to supine with supervision-not met  3. Pt sit to stand with AD if needed with supervision-not met  4. Pt to perform gait 150 ft with AD if needed with supervision.-not met  5. Pt to go up/down curb step with AD if needed with CGA.-not met    Outcome: Ongoing, Progressing   Goals remain appropriate. 2/5/2024

## 2024-02-05 NOTE — ASSESSMENT & PLAN NOTE
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 Stepped up to the ICU 1/5 for afib RVR which was refractory to medical management and c/b development of tachy-eulogio syndrome now s/p micra placement 1/16 for tachy-eulogio syndrome in the setting of RVR. Did have brief SBO type picture 1/7 which resolved with conservative management.     - s/p R thoracotomy with R lower lobectomy for carcinoid w/ MLND; IPV injury requiring repair. CT removed post operatively.  - Refractory afib RVR requiring ICU step up 1/05, s/p chemical cardioversion initially with recurrence c/b developing tachy-eulogio syndrome    - cards and EP following, appreciate assistance            - Plan for holter monitor 30 days after discharge followed by EP outpatient follow up    - s/p TVP placement and now micra implantation 1/16    - Heparin gtt resumed 1/22; will need to transition to eliquis likely following CT removal; held 1/23 2/2 GI bleed     - Heparin ppx 1/29  - s/p R chest tube placement with IR 1/17, L chest tube placement 1/18; L CT removed 1/25; Last R CT out 1/28  - Pulmonary toilet: IS, Acapella, Mucomyst daily; CPT ordered  - Infectious work up 2/2 leukocytosis. She remains afebrile. Effusions and compressive atelectasis also likely contributing to leukocytosis.Trended up to 30k 1/16, now downtrending. Zosyn 1/11-1/15; Meropenam 1/16 - 1/20; Plan for Cefpodoxime and metronidazole to start 1/21 per ID. 4 week abx plan for empiric treatment of presumed parapneumonic effusion - end date 2/15/2024.    - ID following, appreciate assistance. Reconsult following thora results.   - Bcx 1/11: Staph, probable contaminant  - Bcx1/12: No growth  - Rapid blood PCR: staph epi  - Sputum Cx 1/11: klebsiella & e. Coli  - R pleural fluid Cx 1/17: No growth (fungal  pending still, neg KOH)  - SBO type episode 1/07 requiring NGT decompression, which resolved with conservative management   - GI bleed 1/23/2024 - heparin held, 2uPRBC, 1uFFP, heparin reversed. GI consulted                       - EGD 1/24 - could not visualize stomach much 2/2 food residue; EGD aborted                       - 2u PRBC overnight 1/25 for Hgb 6.3, slow downtrend. Repeat EGD 1/26 demonstrates non-bleeding ulcers                  - Continue protonix. GI signed off. Repeat scope in 8 weeks. F/u H pylori   - Diabetic diet diet, novasource renal BID; marinol for appetite.   - Bowel regimen, PPI  - Nephrology following, appreciate assistance. CRRT 1/17 overnight. HD trial 1/19 passed but requiring midodrine, 1.5 L LR, metoprolol for RVR. HD 1/26           - LUE AVF created 10.2023, US 1/22/2023 with good flow metrics, okay to use per vascular   - Oxybutinin for bladder spasms   - MM pain control as needed  - Continue PT/OT, mobilization, likely post hospital placement     Dispo: Tentatively planning on DC to SNF today. Will need to call for Holter monitor prior to DC

## 2024-02-06 VITALS
HEIGHT: 65 IN | DIASTOLIC BLOOD PRESSURE: 59 MMHG | BODY MASS INDEX: 24.83 KG/M2 | SYSTOLIC BLOOD PRESSURE: 120 MMHG | OXYGEN SATURATION: 97 % | TEMPERATURE: 98 F | HEART RATE: 85 BPM | RESPIRATION RATE: 18 BRPM | WEIGHT: 149 LBS

## 2024-02-06 LAB
ALBUMIN SERPL BCP-MCNC: 2 G/DL (ref 3.5–5.2)
ALP SERPL-CCNC: 86 U/L (ref 55–135)
ALT SERPL W/O P-5'-P-CCNC: 6 U/L (ref 10–44)
ANION GAP SERPL CALC-SCNC: 9 MMOL/L (ref 8–16)
AST SERPL-CCNC: 17 U/L (ref 10–40)
BASOPHILS # BLD AUTO: 0.03 K/UL (ref 0–0.2)
BASOPHILS NFR BLD: 0.5 % (ref 0–1.9)
BILIRUB SERPL-MCNC: 0.4 MG/DL (ref 0.1–1)
BUN SERPL-MCNC: 26 MG/DL (ref 8–23)
CALCIUM SERPL-MCNC: 8.5 MG/DL (ref 8.7–10.5)
CHLORIDE SERPL-SCNC: 102 MMOL/L (ref 95–110)
CO2 SERPL-SCNC: 21 MMOL/L (ref 23–29)
CREAT SERPL-MCNC: 3.2 MG/DL (ref 0.5–1.4)
DIFFERENTIAL METHOD BLD: ABNORMAL
EOSINOPHIL # BLD AUTO: 0.2 K/UL (ref 0–0.5)
EOSINOPHIL NFR BLD: 2.7 % (ref 0–8)
ERYTHROCYTE [DISTWIDTH] IN BLOOD BY AUTOMATED COUNT: 24.9 % (ref 11.5–14.5)
EST. GFR  (NO RACE VARIABLE): 14.7 ML/MIN/1.73 M^2
GLUCOSE SERPL-MCNC: 127 MG/DL (ref 70–110)
HCT VFR BLD AUTO: 26.4 % (ref 37–48.5)
HGB BLD-MCNC: 8.8 G/DL (ref 12–16)
IMM GRANULOCYTES # BLD AUTO: 0.16 K/UL (ref 0–0.04)
IMM GRANULOCYTES NFR BLD AUTO: 2.7 % (ref 0–0.5)
LYMPHOCYTES # BLD AUTO: 0.7 K/UL (ref 1–4.8)
LYMPHOCYTES NFR BLD: 12.5 % (ref 18–48)
MAGNESIUM SERPL-MCNC: 1.9 MG/DL (ref 1.6–2.6)
MCH RBC QN AUTO: 29.9 PG (ref 27–31)
MCHC RBC AUTO-ENTMCNC: 33.3 G/DL (ref 32–36)
MCV RBC AUTO: 90 FL (ref 82–98)
MONOCYTES # BLD AUTO: 1 K/UL (ref 0.3–1)
MONOCYTES NFR BLD: 16.4 % (ref 4–15)
NEUTROPHILS # BLD AUTO: 3.9 K/UL (ref 1.8–7.7)
NEUTROPHILS NFR BLD: 65.2 % (ref 38–73)
NRBC BLD-RTO: 0 /100 WBC
PHOSPHATE SERPL-MCNC: 2.2 MG/DL (ref 2.7–4.5)
PLATELET # BLD AUTO: 194 K/UL (ref 150–450)
PMV BLD AUTO: 10.9 FL (ref 9.2–12.9)
POCT GLUCOSE: 221 MG/DL (ref 70–110)
POTASSIUM SERPL-SCNC: 3.9 MMOL/L (ref 3.5–5.1)
PROT SERPL-MCNC: 5.6 G/DL (ref 6–8.4)
RBC # BLD AUTO: 2.94 M/UL (ref 4–5.4)
SODIUM SERPL-SCNC: 132 MMOL/L (ref 136–145)
WBC # BLD AUTO: 5.92 K/UL (ref 3.9–12.7)

## 2024-02-06 PROCEDURE — 80053 COMPREHEN METABOLIC PANEL: CPT | Performed by: STUDENT IN AN ORGANIZED HEALTH CARE EDUCATION/TRAINING PROGRAM

## 2024-02-06 PROCEDURE — 27000221 HC OXYGEN, UP TO 24 HOURS

## 2024-02-06 PROCEDURE — 85025 COMPLETE CBC W/AUTO DIFF WBC: CPT | Performed by: STUDENT IN AN ORGANIZED HEALTH CARE EDUCATION/TRAINING PROGRAM

## 2024-02-06 PROCEDURE — 36415 COLL VENOUS BLD VENIPUNCTURE: CPT | Performed by: STUDENT IN AN ORGANIZED HEALTH CARE EDUCATION/TRAINING PROGRAM

## 2024-02-06 PROCEDURE — 99232 SBSQ HOSP IP/OBS MODERATE 35: CPT | Mod: ,,, | Performed by: NURSE PRACTITIONER

## 2024-02-06 PROCEDURE — 25000003 PHARM REV CODE 250: Performed by: STUDENT IN AN ORGANIZED HEALTH CARE EDUCATION/TRAINING PROGRAM

## 2024-02-06 PROCEDURE — 97535 SELF CARE MNGMENT TRAINING: CPT

## 2024-02-06 PROCEDURE — 83735 ASSAY OF MAGNESIUM: CPT

## 2024-02-06 PROCEDURE — 99900035 HC TECH TIME PER 15 MIN (STAT)

## 2024-02-06 PROCEDURE — 25000003 PHARM REV CODE 250

## 2024-02-06 PROCEDURE — 1111F DSCHRG MED/CURRENT MED MERGE: CPT | Mod: CPTII,,, | Performed by: STUDENT IN AN ORGANIZED HEALTH CARE EDUCATION/TRAINING PROGRAM

## 2024-02-06 PROCEDURE — 25000242 PHARM REV CODE 250 ALT 637 W/ HCPCS

## 2024-02-06 PROCEDURE — 25000242 PHARM REV CODE 250 ALT 637 W/ HCPCS: Performed by: STUDENT IN AN ORGANIZED HEALTH CARE EDUCATION/TRAINING PROGRAM

## 2024-02-06 PROCEDURE — 25000003 PHARM REV CODE 250: Performed by: SURGERY

## 2024-02-06 PROCEDURE — 99024 POSTOP FOLLOW-UP VISIT: CPT | Mod: ,,, | Performed by: STUDENT IN AN ORGANIZED HEALTH CARE EDUCATION/TRAINING PROGRAM

## 2024-02-06 PROCEDURE — 63600175 PHARM REV CODE 636 W HCPCS

## 2024-02-06 PROCEDURE — 63600175 PHARM REV CODE 636 W HCPCS: Performed by: STUDENT IN AN ORGANIZED HEALTH CARE EDUCATION/TRAINING PROGRAM

## 2024-02-06 PROCEDURE — 94761 N-INVAS EAR/PLS OXIMETRY MLT: CPT

## 2024-02-06 PROCEDURE — A4216 STERILE WATER/SALINE, 10 ML: HCPCS | Performed by: STUDENT IN AN ORGANIZED HEALTH CARE EDUCATION/TRAINING PROGRAM

## 2024-02-06 PROCEDURE — 84100 ASSAY OF PHOSPHORUS: CPT

## 2024-02-06 PROCEDURE — 94640 AIRWAY INHALATION TREATMENT: CPT

## 2024-02-06 RX ORDER — SODIUM,POTASSIUM PHOSPHATES 280-250MG
1 POWDER IN PACKET (EA) ORAL ONCE
Status: COMPLETED | OUTPATIENT
Start: 2024-02-06 | End: 2024-02-06

## 2024-02-06 RX ORDER — SODIUM CHLORIDE 9 MG/ML
INJECTION, SOLUTION INTRAVENOUS ONCE
Status: DISCONTINUED | OUTPATIENT
Start: 2024-02-06 | End: 2024-02-06 | Stop reason: HOSPADM

## 2024-02-06 RX ORDER — HEPARIN SODIUM 1000 [USP'U]/ML
1000 INJECTION, SOLUTION INTRAVENOUS; SUBCUTANEOUS
Status: DISCONTINUED | OUTPATIENT
Start: 2024-02-06 | End: 2024-02-06 | Stop reason: HOSPADM

## 2024-02-06 RX ADMIN — METOPROLOL TARTRATE 12.5 MG: 25 TABLET, FILM COATED ORAL at 09:02

## 2024-02-06 RX ADMIN — MIDODRINE HYDROCHLORIDE 10 MG: 5 TABLET ORAL at 09:02

## 2024-02-06 RX ADMIN — LIDOCAINE 5% 1 PATCH: 700 PATCH TOPICAL at 09:02

## 2024-02-06 RX ADMIN — SENNOSIDES AND DOCUSATE SODIUM 1 TABLET: 8.6; 5 TABLET ORAL at 09:02

## 2024-02-06 RX ADMIN — POTASSIUM & SODIUM PHOSPHATES POWDER PACK 280-160-250 MG 1 PACKET: 280-160-250 PACK at 09:02

## 2024-02-06 RX ADMIN — PANTOPRAZOLE SODIUM 40 MG: 40 TABLET, DELAYED RELEASE ORAL at 06:02

## 2024-02-06 RX ADMIN — Medication 10 ML: at 12:02

## 2024-02-06 RX ADMIN — DRONABINOL 5 MG: 2.5 CAPSULE ORAL at 09:02

## 2024-02-06 RX ADMIN — ALBUTEROL SULFATE 2.5 MG: 2.5 SOLUTION RESPIRATORY (INHALATION) at 02:02

## 2024-02-06 RX ADMIN — ACETYLCYSTEINE 4 ML: 100 INHALANT RESPIRATORY (INHALATION) at 02:02

## 2024-02-06 RX ADMIN — ACETYLCYSTEINE 4 ML: 100 INHALANT RESPIRATORY (INHALATION) at 08:02

## 2024-02-06 RX ADMIN — ERYTHROMYCIN ETHYLSUCCINATE 200 MG: 200 SUSPENSION ORAL at 12:02

## 2024-02-06 RX ADMIN — VENLAFAXINE 37.5 MG: 37.5 TABLET ORAL at 09:02

## 2024-02-06 RX ADMIN — OXYBUTYNIN CHLORIDE 5 MG: 5 TABLET ORAL at 09:02

## 2024-02-06 RX ADMIN — ERYTHROMYCIN ETHYLSUCCINATE 200 MG: 200 SUSPENSION ORAL at 09:02

## 2024-02-06 RX ADMIN — HEPARIN SODIUM 5000 UNITS: 5000 INJECTION INTRAVENOUS; SUBCUTANEOUS at 05:02

## 2024-02-06 RX ADMIN — Medication 10 ML: at 06:02

## 2024-02-06 RX ADMIN — METRONIDAZOLE 500 MG: 500 TABLET ORAL at 09:02

## 2024-02-06 RX ADMIN — ALBUTEROL SULFATE 2.5 MG: 2.5 SOLUTION RESPIRATORY (INHALATION) at 08:02

## 2024-02-06 NOTE — PT/OT/SLP PROGRESS
Occupational Therapy   Treatment    Name: Lily Green  MRN: [id number]  Admitting Diagnosis:  Malignant carcinoid tumor of bronchus  11 Days Post-Op    Recommendations:     Discharge Recommendations: Moderate Intensity Therapy  Discharge Equipment Recommendations:  to be determined by next level of care, walker, rolling, bedside commode  Barriers to discharge:  None    Assessment:     Lily Green is a 73 y.o. female with a medical diagnosis of Malignant carcinoid tumor of bronchus.  She presents with SOB and low activity tolerance. Performance deficits affecting function are impaired balance, decreased safety awareness, impaired endurance, impaired functional mobility, gait instability, weakness, impaired cardiopulmonary response to activity.     Rehab Prognosis:  Good; patient would benefit from acute skilled OT services to address these deficits and reach maximum level of function.       Plan:     Patient to be seen 4 x/week to address the above listed problems via self-care/home management, therapeutic activities, therapeutic exercises, neuromuscular re-education  Plan of Care Expires: 03/04/24  Plan of Care Reviewed with: patient    Subjective     Chief Complaint: SOB  Patient/Family Comments/goals: return home  Pain/Comfort:  Pain Rating 1: 0/10    Objective:     Communicated with: nsg prior to session.  Patient found up in chair with telemetry upon OT entry to room.    General Precautions: Standard, fall    Orthopedic Precautions:N/A  Braces: N/A  Respiratory Status: Nasal cannula, flow 2 L/min     Occupational Performance:     Bed Mobility:    UIC     Functional Mobility/Transfers:  Patient completed Sit <> Stand Transfer with moderate assistance and of 2 persons  with  rolling walker   Patient completed Chair <> BSC Transfer using Stand Pivot technique with moderate assistance and of 2 persons with rolling walker    Activities of Daily Living:  Grooming: setup A seated in bedside chair  for oral care  Toileting: maximal assistance perianal care      [facility] 6 Click ADL: 11    Treatment & Education:  Pt educated on role and purpose of therapy  Pt educated on goal setting  Pt educated on benefits of OOB activity  Pt educated on self advocacy     Patient left up in chair with all lines intact, call button in reach, nsg notified, and family present    GOALS:   Multidisciplinary Problems       Occupational Therapy Goals          Problem: Occupational Therapy    Goal Priority Disciplines Outcome Interventions   Occupational Therapy Goal     OT, PT/OT Ongoing, Progressing    Description: Goals to be met by: 02/2/24     Patient will increase functional independence with ADLs by performing:    UE Dressing with Supervision.  Grooming while standing at sink with Modified [place].  Toileting from toilet with Modified [place] for hygiene and clothing management.   Supine to sit with Supervision.  Toilet transfer to toilet with Supervision.                         Time Tracking:     OT Date of Treatment: 02/06/24  OT Start Time: 1026  OT Stop Time: 1100  OT Total Time (min): 34 min    Billable Minutes:Self Care/Home Management 34    OT/RICHARD: OT          2/6/2024

## 2024-02-06 NOTE — PLAN OF CARE
"[facility] Plan of Care Note    Dx: Malignant carcinoid tumor of bronchus [C7A.090]  Carcinoid tumor of lung [D3A.090]    Shift Events: Dialysis withdrew 2L. DC to SNF postponed 1 day    Goals of Care: mgmt diabetes,     Neuro: intact    Vital Signs: /71 (BP Location: Right arm, Patient Position: Lying)   Pulse 88   Temp 97.4 °F (36.3 °C) (Oral)   Resp 20   Ht 5' 5" (1.651 m)   Wt 67.6 kg (149 lb)   LMP  (LMP Unknown) Comment: BEAU/ NO BSO  1986  SpO2 97%   Breastfeeding No   BMI 24.79 kg/m²     Respiratory: 2L NC    Diet: Diet [place] Gluten Free  Dietary nutrition supplements Novasource Renal - Vanilla,Dietary nutrition supplements Novasource Renal - Vanilla,Dietary nutrition supplements Novasource Renal - Any flavor    Is patient tolerating current diet? yes    GTTS: na    Urine Output/Bowel Movement:     Intake/Output Summary (Last 24 hours) at 2/5/2024 2001  Last data filed at 2/5/2024 1150  Gross per 24 hour   Intake --   Output 2600 ml   Net -2600 ml          Drains/Tubes/Tube Feeds (include total output/shift):   Output by Drain (mL) 02/03/24 0701 - 02/03/24 1900 02/03/24 1901 - 02/04/24 0700 02/04/24 0701 - 02/04/24 1900 02/04/24 1901 - 02/05/24 0700 02/05/24 0701 - 02/05/24 1900 02/05/24 1901 - 02/05/24 2001   Requested LDAs do not have output data documented.          Lines:   ML NELY    Accuchecks:ACHS    Skin: surgical sites    Fall Risk Score: see flowsheets    Activity level? See flowsheets    Any scheduled procedures? NA    Any safety concerns? FAlls    Other: NA   "

## 2024-02-06 NOTE — ASSESSMENT & PLAN NOTE
BG goal: 140-180    - Novolog Mod dose correction with ISF 25 starting at 150 prn   - POCT Glucose before meals and at bedtime   - Hypoglycemia protocol in place      ** Please notify Endocrine for any change and/or advance in diet**  ** Please call Endocrine for any BG related issues **     Discharge Planning:   D/C to rehab on [facility]  Follow-up with   Sherine Rocha NP

## 2024-02-06 NOTE — PROGRESS NOTES
"[facility]  Endocrinology  Progress Note    Admit Date: 2024     Reason for Consult: Management of T2DM, Hyperglycemia     Surgical Procedure and Date: 24--   LYMPHADENECTOMY (Right)  THORACOTOMY (Right)  THORACOTOMY, WITH INITIAL THERAPEUTIC WEDGE RESECTION OF LUNG (Right)    Diabetes diagnosis year: in her 50s    Home Diabetes Medications:    LDC SSI   Blood sugar 150 to 200, + 1 unit  Blood sugar 201 to 250, + 2 units  Blood sugar 251 to 300, + 3 units  Blood sugar 301 to 350, + 4 units   Blood sugar greater than 350, + 5 units    How often checking glucose at home? >4 x day   BG readings on regimen: 100-120s  Hypoglycemia on the regimen?  denies   Missed doses on regimen?  No    Diabetes Complications include:     Nephropathy     Complicating diabetes co morbidities:   ESRD and Glucocorticoid use       HPI:   Patient is a 73 y.o. female with DDD, benign essential tremor, meniere's disease, HTN, HLD, DM2, GERD, Celiac's, IBS who is found to have RML Carcinoid tumor.  Chest CT 10/18/23 revealed 1.2 x 1.1 cm right middle lobe nodule, previously 1.2 x 1.0 cm in 2023. Underwent Robotic Bronchoscopy 10/20/23; path: RML nodule positive for well-differentiated neuroendocrine neoplasm, carcinoid tumor. PET/CT Cu64 23 showed known RML nodule with hypermetabolic activity. Now presents s/p thoracotomy with wedge resection of right lung. Endocrine consulted for BG management.         Interval HPI:   No acute events overnight. Patient in room 1051/1051 A. Blood glucose stable. BG below goal on current insulin regimen (SSI, prandial, and basal insulin ). Steroid use- None.   11 Days Post-Op  Renal function- Abnormal - 3.2 cr   Vasopressors-  None     Diet [place] Gluten Free     Eatin%  Nausea: No  Hypoglycemia and intervention: No  Fever: No  TPN and/or TF: No      BP (!) 120/59   Pulse 81   Temp 98.1 °F (36.7 °C)   Resp 18   Ht 5' 5" (1.651 m)   Wt 67.6 kg (149 lb)   LMP  (LMP Unknown) " "Comment: BEAU/ NO BSO  1986  SpO2 97%   Breastfeeding No   BMI 24.79 kg/m²     Labs Reviewed and Include    Recent Labs   Lab 02/06/24  0335   *   CALCIUM 8.5*   ALBUMIN 2.0*   PROT 5.6*   *   K 3.9   CO2 21*      BUN 26*   CREATININE 3.2*   ALKPHOS 86   ALT 6*   AST 17   BILITOT 0.4     Lab Results   Component Value Date    WBC 5.92 02/06/2024    HGB 8.8 (L) 02/06/2024    HCT 26.4 (L) 02/06/2024    MCV 90 02/06/2024     02/06/2024     No results for input(s): "TSH", "FREET4" in the last 168 hours.  Lab Results   Component Value Date    HGBA1C 4.9 11/15/2023       Nutritional status:   Body mass index is 24.79 kg/m².  Lab Results   Component Value Date    ALBUMIN 2.0 (L) 02/06/2024    ALBUMIN 2.2 (L) 02/05/2024    ALBUMIN 2.2 (L) 02/04/2024     No results found for: "PREALBUMIN"    Estimated Creatinine Clearance: 14.1 mL/min (A) (based on SCr of 3.2 mg/dL (H)).    Accu-Checks  Recent Labs     02/03/24  1513 02/04/24  0736 02/04/24  1133 02/04/24  1640 02/05/24  1125 02/05/24  1644 02/06/24  1213   POCTGLUCOSE 171* 130* 123* 211* 114* 201* 221*       Current Medications and/or Treatments Impacting Glycemic Control  Immunotherapy:    Immunosuppressants       None          Steroids:   Hormones (From admission, onward)      None          Pressors:    Autonomic Drugs (From admission, onward)      Start     Stop Route Frequency Ordered    01/23/24 1715  midodrine tablet 10 mg         -- Oral 2 times daily with meals 01/23/24 1315    01/22/24 1045  metoprolol tartrate (LOPRESSOR) split tablet 12.5 mg         -- Oral 2 times daily 01/22/24 0932    01/22/24 0930  midodrine tablet 15 mg         -- Oral As needed (PRN) 01/22/24 0930          Hyperglycemia/Diabetes Medications:   Antihyperglycemics (From admission, onward)      Start     Stop Route Frequency Ordered    01/19/24 1232  insulin aspart U-100 pen 0-10 Units         -- SubQ Before meals & nightly PRN 01/19/24 1232            ASSESSMENT and " PLAN    Renal/  ESRD (end stage renal disease)    Titrate insulin slowly to avoid hypoglycemia as the risk of hypoglycemia increases with decreased creatinine clearance.      Oncology  * Malignant carcinoid tumor of bronchus  Managed per primary team        Endocrine  Type 2 diabetes mellitus with diabetic polyneuropathy, without long-term current use of insulin  BG goal: 140-180    - Novolog Mod dose correction with ISF 25 starting at 150 prn   - POCT Glucose before meals and at bedtime   - Hypoglycemia protocol in place      ** Please notify Endocrine for any change and/or advance in diet**  ** Please call Endocrine for any BG related issues **     Discharge Planning:   D/C to rehab on [facility]  Follow-up with   Sherine Rocha, AZUL Perry, BRITTANI, FNP  Endocrinology  [facility]

## 2024-02-06 NOTE — PLAN OF CARE
Pt Aox4. VSS. No signs of respiratory distress on 2L NC. All IV access removed.  Pt remained injury free through shift. Report called to receiving facility.  Pt was taken downstairs via wheelchair. Pt discharge to SNF.

## 2024-02-06 NOTE — PLAN OF CARE
CHW met with patient/family at bedside. Patient experience rounding completed and reviewed the following.     Do you know your discharge plan? Yes or No,    If yes, what is the plan? (Home, Home Health, Rehab, SNF, LTAC, or Other)    SNF ([facility])    Have you discussed your needs and preferences with your SW/CM? Yes or No    Yes    If you are discharging home, do you have help at home? Yes or No      SNF    Do you think you will need help additional at home at discharge? Yes or No    SNF    Do you currently have difficulty keeping up with bills, affording medicine or buying food? Yes or No      NO    Assigned SW/CM notified of any patient/family needs or concerns. Appropriate resources provided to address patient's needs.  Yesenia Montiel CHW  Case Management   [phone]

## 2024-02-06 NOTE — PLAN OF CARE
[facility]  Discharge Final Note    Primary Care Provider: Pavel Calixto MD    Expected Discharge Date: 2/6/2024    Final Discharge Note (most recent)       Final Note - 02/06/24 1404          Final Note    Assessment Type Final Discharge Note     Anticipated Discharge Disposition Skilled Nursing Facility     Hospital Resources/Appts/Education Provided Provided patient/caregiver with written discharge plan information        Post-Acute Status    Post-Acute Authorization Placement     Post-Acute Placement Status Set-up Complete/Auth obtained     Patient choice form signed by patient/caregiver List with quality metrics by geographic area provided     Discharge Delays None known at this time                     Important Message from Medicare  Important Message from Medicare regarding Discharge Appeal Rights: Given to patient/caregiver, Explained to patient/caregiver, Signed/date by patient/caregiver     Date IMM was signed: 02/06/24  Time IMM was signed: 1027    Contact Info       [facility]   Specialty: Home Health Services, Hospice and Palliative Medicine    [address] [place] [address]   Phone: [phone]       Next Steps: Follow up    Instructions: Home Health          Pt to d.c to [facility] via [facility] van. No other needs at this time.     Yesenia Burciaga LCSW  Case Management/[facility]  [phone]

## 2024-02-06 NOTE — PLAN OF CARE
"[facility] Plan of Care Note    Dx:   Malignant carcinoid tumor of bronchus [C7A.090]  Carcinoid tumor of lung [D3A.090]    Shift Events: None    Goals of Care: Comfort, safety    Neuro: Alert, oriented to place and self, confused at times    Vital Signs: /63 (BP Location: Right arm, Patient Position: Lying)   Pulse 81   Temp 97.8 °F (36.6 °C) (Oral)   Resp 18   Ht 5' 5" (1.651 m)   Wt 67.6 kg (149 lb)   LMP  (LMP Unknown) Comment: BEAU/ NO BSO  1986  SpO2 97%   Breastfeeding No   BMI 24.79 kg/m²     Respiratory: 2L    Diet: Diet [place] Gluten Free  Dietary nutrition supplements Novasource Renal - Vanilla,Dietary nutrition supplements Novasource Renal - Vanilla,Dietary nutrition supplements Novasource Renal - Any flavor    Is patient tolerating current diet? Yes, poor appetite    GTTS: None    Urine Output/Bowel Movement:   No intake/output data recorded.  Last Bowel Movement: 02/04/24    Drains/Tubes/Tube Feeds (include total output/shift):   No intake/output data recorded.    Lines: Midline    Accuchecks: ACHS    Skin: R back incision    Fall Risk Score: Per charting    Activity level? Up with assist    Any scheduled procedures? None    Any safety concerns? Fall risk    Other: Plan to dc to SNF 2/6   "

## 2024-02-06 NOTE — PLAN OF CARE
Recommendations     1. Rec'd Renal, gluten free diet with texture per SLP. Continue bland modifier per pt preference.     2. Continue ONS Novasource renal TID.       3. RD to monitor and follow.     Goals: Meet % EEN, EPN by RD f/u  Nutrition Goal Status: progressing towards goal  Communication of RD Recs:  (POC)

## 2024-02-06 NOTE — DISCHARGE SUMMARY
Jeovanny michael [facility]  Thoracic Surgery  Discharge Summary    Patient Name: Lily Green  MRN: [id number]  Admission Date: 1/2/2024  Hospital Length of Stay: 35 days  Discharge Date and Time:  02/06/2024 3:15 PM  Attending Physician: No att. providers found   Discharging Provider: Mike Lam MD  Primary Care Provider: Pavel Calixto MD    HPI:   Patient is a 73 y.o. 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  Chest CT 10/18/23 revealed 1.2 x 1.1 cm right middle lobe nodule, previously 1.2 x 1.0 cm in July 2023. Underwent Robotic Bronchoscopy 10/20/23; path: RML nodule positive for well-differentiated neuroendocrine neoplasm, carcinoid tumor. PET/CT Cu64 11/17/23 showed known RML nodule with hypermetabolic activity.         PSH: breast biopsy with tagging, appendectomy, colostomy closure (01/16/23), BEAU, D&C of uterus, Ex Lap x2 (01/14/23 & 01/16/23), R rotator cuff repair x2 (2009/2010), tonsillectomy, UGI endoscopy (2018)  Meds: torsemide, pregabalin, dronabinol, sevelamer    Procedure(s) (LRB):  EGD (ESOPHAGOGASTRODUODENOSCOPY) (N/A)      Hospital Course: The hospital course of Lily Green was prolonged and complex. Please see medical record for full details.    In brief, she presented on 1/2/24 for right lower lobectomy for resection of her carcinoid tumor and MLND. This ultimately required a thoracotomy. She was admitted to the floor postoperatively with a right sided chest tube to water seal. This chest tube was able to be removed without issue. 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 She was anticoagulated with a heparin gtt throughout this arrhythmia until she developed a GI bleed on 1/23. She underwent an EGD which showed nonbleeding  ulcers. Her anticoagulation was discussed amongst services and it was decided that the risks outweighed the benefits, so it was stopped. Her discharge needs including Holter monitor for 30 days and SNF acceptance were able to be obtained and she met all discharge milestones on 2/6/24. She was able to be discharged to SNF with follow up in clinic.    Goals of Care Treatment Preferences:  Code Status: Full Code      Consults (From admission, onward)          Status Ordering Provider     Inpatient consult to Midline team  Once        Provider:  (Not yet assigned)    Completed CR CARLOS     Inpatient consult to Physical Medicine Rehab  Once        Provider:  (Not yet assigned)    Completed BULAMRO LONGORIA     Inpatient consult to Social Work  Once        Provider:  (Not yet assigned)    Acknowledged BULMARO LONGORIA     Inpatient consult to Gastroenterology  Once        Provider:  (Not yet assigned)    Completed LAMAR BUCKLEY     Inpatient consult to Midline team  Once        Provider:  (Not yet assigned)    Completed CR CARLOS     Inpatient consult to Vascular Surgery  Once        Provider:  (Not yet assigned)    Completed TI MARI     Inpatient consult to Psychology  Once        Provider:  (Not yet assigned)    Completed NIMO JADON     Inpatient consult to Interventional Radiology  Once        Provider:  (Not yet assigned)    Completed NIMO JADON     Inpatient consult to Infectious Diseases  Once        Provider:  (Not yet assigned)    Completed NIMO, JADON     Inpatient consult to Interventional Radiology  Once        Provider:  (Not yet assigned)    Completed NIMO JADON     Inpatient consult to Midline team  Once        Provider:  (Not yet assigned)    Completed CR CARLOS     Inpatient consult to Infectious Diseases  Once        Provider:  (Not yet assigned)    Completed LAMAR BUCKLEY     Inpatient consult to Registered Dietitian/Nutritionist  Once         Provider:  (Not yet assigned)    Completed NIMO JADON     Inpatient consult to Midline team  Once        Provider:  (Not yet assigned)    Completed CARMELA SUEROE     Inpatient consult to Electrophysiology  Once        Provider:  (Not yet assigned)    Completed NIMO JADON     Inpatient consult to Registered Dietitian/Nutritionist  Once        Provider:  (Not yet assigned)    Completed TI MARI     Inpatient consult to Cardiology  Once        Provider:  (Not yet assigned)    Completed CARMELA SUEROE     Inpatient consult to PICC team ([facility])  Once        Provider:  (Not yet assigned)    Completed CR CARLOS     Inpatient consult to Nephrology  Once        Provider:  (Not yet assigned)    Completed JADON VELÁSQUEZ     Inpatient consult to Endocrinology  Once        Provider:  (Not yet assigned)    Completed JADON VELÁSQUEZ            Significant Diagnostic Studies: Labs: All labs within the past 24 hours have been reviewed    Pending Diagnostic Studies:       Procedure Component Value Units Date/Time    CBC auto differential [[id number]] Collected: 01/26/24 0421    Order Status: Sent Lab Status: In process Updated: 01/26/24 0422    Specimen: Blood     Freeze and Hold,  [[id number]] Collected: 01/18/24 1534    Order Status: Sent Lab Status: No result     Specimen: Body Fluid from Thoracentesis Fluid     Magnesium [[id number]] Collected: 01/14/24 2059    Order Status: Sent Lab Status: In process Updated: 01/14/24 2100    Specimen: Blood     Renal function panel [[id number]] Collected: 01/14/24 2059    Order Status: Sent Lab Status: In process Updated: 01/14/24 2100    Specimen: Blood           Final Active Diagnoses:    Diagnosis Date Noted POA    PRINCIPAL PROBLEM:  Malignant carcinoid tumor of bronchus [C7A.090] 11/24/2023 Yes    Chronic kidney disease-mineral and bone disorder [N18.9, E83.9, M89.9] 01/30/2024 Unknown    GIB (gastrointestinal bleeding) [K92.2]  01/24/2024 No    Syncope [R55] 01/24/2024 No    Carcinoid tumor of lung [D3A.090] 01/22/2024 Yes    Parapneumonic effusion [J18.9, J91.8] 01/15/2024 Yes    Bradycardia [R00.1] 01/12/2024 No    Anticoagulated on heparin [Z79.01] 01/11/2024 Not Applicable    Anuria [R34] 01/11/2024 Yes    A-V fistula [I77.0] 01/11/2024 Yes    Atrial fibrillation [I48.91] 01/05/2024 No    ESRD (end stage renal disease) [N18.6] 01/02/2024 Yes    Anemia in ESRD (end-stage renal disease) [N18.6, D63.1] 01/30/2023 Yes    On supplemental oxygen by nasal cannula [Z78.9] 01/28/2023 Yes    Type 2 diabetes mellitus with diabetic polyneuropathy, without long-term current use of insulin [E11.42] 10/17/2019 Yes      Problems Resolved During this Admission:      Discharged Condition: good    Disposition: Skilled Nursing Facility    Follow Up:   Follow-up Information       [place], [facility] - Follow up.    Specialties: Home Health Services, Hospice and Palliative Medicine  Why: Home Health  Contact information:  [address] [place] [id number]  [phone]                           Patient Instructions:      Ambulatory referral/consult to [facility] - Medical & Palliative   Standing Status: Future   Referral Priority: Routine Referral Type: Consultation   Referral Reason: Specialty Services Required   Number of Visits Requested: 1     Ambulatory referral/consult to Gastroenterology   Standing Status: Future   Referral Priority: Routine Referral Type: Consultation   Referral Reason: Specialty Services Required   Requested Specialty: Gastroenterology   Number of Visits Requested: 1     Ambulatory referral/consult to Cardiac Electrophysiology   Standing Status: Future   Referral Priority: Routine Referral Type: Consultation   Referral Reason: Specialty Services Required   Requested Specialty: Cardiology   Number of Visits Requested: 1     Medications:  Reconciled Home Medications:      Medication List        START taking these  medications      cefpodoxime 200 MG tablet  Commonly known as: VANTIN  Take 1 tablet (200 mg total) by mouth every evening. for 13 days     fluconazole 200 MG Tab  Commonly known as: DIFLUCAN  Take 1 tablet (200 mg total) by mouth every evening. for 7 days     metoprolol tartrate 25 MG tablet  Commonly known as: LOPRESSOR  Take 0.5 tablets (12.5 mg total) by mouth 2 (two) times daily.     metroNIDAZOLE 500 MG tablet  Commonly known as: FLAGYL  Take 1 tablet (500 mg total) by mouth every 12 (twelve) hours. for 13 days     venlafaxine 37.5 MG Tab  Commonly known as: EFFEXOR  Take 1 tablet (37.5 mg total) by mouth once daily.            CHANGE how you take these medications      atorvastatin 40 MG tablet  Commonly known as: LIPITOR  Take 1 tablet (40 mg total) by mouth once daily.  What changed: when to take this     midodrine 10 MG tablet  Commonly known as: PROAMATINE  Take 1 tablet (10 mg total) by mouth 2 (two) times daily with meals.  What changed:   medication strength  how much to take  when to take this  reasons to take this     pantoprazole 40 MG tablet  Commonly known as: PROTONIX  Take 1 tablet (40 mg total) by mouth 2 (two) times daily before meals.  What changed: when to take this     pregabalin 75 MG capsule  Commonly known as: LYRICA  Take 1 capsule (75 mg total) by mouth Daily. Give after HD  What changed: Another medication with the same name was removed. Continue taking this medication, and follow the directions you see here.            CONTINUE taking these medications      BETAHISTINE (BULK) MISC     blood sugar diagnostic Strp  Commonly known as: TRUE METRIX GLUCOSE TEST STRIP  USE ONE STRIP FOR TESTING 2 TIMES A DAY     blood-glucose meter kit  Use to test twice a day     calcium-vitamin D3 500 mg-5 mcg (200 unit) per tablet  Commonly known as: OS-CIPRIANO 500 + D3  Take 1 tablet by mouth once daily.     droNABinol 5 MG capsule  Commonly known as: MARINOL  Take 1 capsule (5 mg total) by mouth 2 (two)  "times daily before meals.     epoetin noble-epbx 10,000 unit/mL imjection  Commonly known as: RETACRIT  Inject 1 mL (10,000 Units total) into the skin every Mon, Wed, Fri. HOLD IF HEMOGLOBIN is 10 or GREATER    DO NOT SHAKE  DO NOT DILUTE  DO NOT MIX with other drug solutions     ferrous gluconate 324 MG tablet  Commonly known as: FERGON  Take 1 tablet (324 mg total) by mouth daily with breakfast.     insulin aspart U-100 100 unit/mL (3 mL) Inpn pen  Commonly known as: NovoLOG  Inject before meals:  150-200=+1, 201-250=+2, 251-300=+3; 301-350=+4, over 350=+5 units     lancets Misc  1 lancet by Misc.(Non-Drug; Combo Route) route 4 (four) times daily.     meclizine 25 mg tablet  Commonly known as: ANTIVERT  Take 1 tablet (25 mg total) by mouth 3 (three) times daily as needed for Dizziness.     melatonin 3 mg tablet  Commonly known as: MELATIN  Take 3 tablets (9 mg total) by mouth nightly as needed for Insomnia.     nutritional supplements Liqd  Take 237 mLs by mouth 4 (four) times daily.     patiromer calcium sorbitex 8.4 gram Pwpk  Commonly known as: VELTASSA  Take 2 packets (16.8 g total) by mouth once daily.     pen needle, diabetic 32 gauge x 5/32" Ndle  Commonly known as: BD ULTRA-FINE MARIS PEN NEEDLE  1 Device by Misc.(Non-Drug; Combo Route) route 4 (four) times daily with meals and nightly.     MELLISSA-BENJY 0.8 mg Tab  Generic drug: B complex-vitamin C-folic acid  Take 1 tablet (0.8 mg total) by mouth once daily.     sevelamer  MG Tab  Commonly known as: RENAGEL  Take 2 tablets (1,600 mg total) by mouth 3 (three) times daily with meals.     torsemide 40 mg Tab  Take 40 mg by mouth once daily.     vit C,N-Wl-ppccc-lutein-zeaxan 250-90-40-1 mg Cap  Commonly known as: PRESERVISION AREDS 2     vitamins A,C,E-zinc-copper 4,296 mcg-226 mg-90 mg Cap  Take 1 tablet by mouth once daily.            STOP taking these medications      coenzyme Q10 100 mg capsule     dicyclomine 10 MG capsule  Commonly known as: BENTYL   "   econazole nitrate 1 % cream     mometasone 0.1 % ointment  Commonly known as: ELOCON     neomycin 500 mg Tab  Commonly known as: MYCIFRADIN     oxyCODONE 5 MG immediate release tablet  Commonly known as: ROXICODONE     simethicone 125 mg Cap capsule  Commonly known as: RENAY Lam MD  Thoracic Surgery  [facility]

## 2024-02-06 NOTE — ASSESSMENT & PLAN NOTE
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 Stepped up to the ICU 1/5 for afib RVR which was refractory to medical management and c/b development of tachy-eulogio syndrome now s/p micra placement 1/16 for tachy-eulogio syndrome in the setting of RVR. Did have brief SBO type picture 1/7 which resolved with conservative management.     - s/p R thoracotomy with R lower lobectomy for carcinoid w/ MLND; IPV injury requiring repair. CT removed post operatively.  - Refractory afib RVR requiring ICU step up 1/05, s/p chemical cardioversion initially with recurrence c/b developing tachy-eulogio syndrome    - cards and EP following, appreciate assistance            - Plan for holter monitor 30 days after discharge followed by EP outpatient follow up    - s/p TVP placement and now micra implantation 1/16    - Heparin gtt resumed 1/22; will need to transition to eliquis likely following CT removal; held 1/23 2/2 GI bleed     - Heparin ppx 1/29  - s/p R chest tube placement with IR 1/17, L chest tube placement 1/18; L CT removed 1/25; Last R CT out 1/28  - Pulmonary toilet: IS, Acapella, Mucomyst daily; CPT ordered  - Infectious work up 2/2 leukocytosis. She remains afebrile. Effusions and compressive atelectasis also likely contributing to leukocytosis.Trended up to 30k 1/16, now downtrending. Zosyn 1/11-1/15; Meropenam 1/16 - 1/20; Plan for Cefpodoxime and metronidazole to start 1/21 per ID. 4 week abx plan for empiric treatment of presumed parapneumonic effusion - end date 2/15/2024.    - ID following, appreciate assistance. Reconsult following thora results.   - Bcx 1/11: Staph, probable contaminant  - Bcx1/12: No growth  - Rapid blood PCR: staph epi  - Sputum Cx 1/11: klebsiella & e. Coli  - R pleural fluid Cx 1/17: No growth (fungal  pending still, neg KOH)  - SBO type episode 1/07 requiring NGT decompression, which resolved with conservative management   - GI bleed 1/23/2024 - heparin held, 2uPRBC, 1uFFP, heparin reversed. GI consulted                       - EGD 1/24 - could not visualize stomach much 2/2 food residue; EGD aborted                       - 2u PRBC overnight 1/25 for Hgb 6.3, slow downtrend. Repeat EGD 1/26 demonstrates non-bleeding ulcers                  - Continue protonix. GI signed off. Repeat scope in 8 weeks. F/u H pylori   - Diabetic diet diet, novasource renal BID; marinol for appetite.   - Bowel regimen, PPI  - Nephrology following, appreciate assistance. CRRT 1/17 overnight. HD trial 1/19 passed but requiring midodrine, 1.5 L LR, metoprolol for RVR. HD 1/26           - LUE AVF created 10.2023, US 1/22/2023 with good flow metrics, okay to use per vascular   - Oxybutinin for bladder spasms   - MM pain control as needed  - Continue PT/OT, mobilization, likely post hospital placement     Dispo: Discharge to SNF today. She is medically ready

## 2024-02-06 NOTE — PROGRESS NOTES
"[facility]ov[facility]  Adult Nutrition  Progress Note    SUMMARY       Recommendations    1. Rec'd Renal, gluten free diet with texture per SLP. Continue bland modifier per pt preference.    2. Continue ONS Novasource renal TID.      3. RD to monitor and follow.    Goals: Meet % EEN, EPN by RD f/u  Nutrition Goal Status: progressing towards goal  Communication of RD Recs:  (POC)    Assessment and Plan    Nutrition Problem  Inadequate oral intake     Related to (etiology):   Decreased ability to consume sufficient energy     Signs and Symptoms (as evidenced by):   Restrictive diet       Interventions/Recommendations (treatment strategy):  Collaboration of nutrition care with other providers  ONS     Nutrition Diagnosis Status:   Continue    Reason for Assessment    Reason For Assessment: RD follow-up  Diagnosis:  (malignant carcinoid tumor of bronchus)  Relevant Medical History: T2DM, ESRD  Interdisciplinary Rounds: did not attend  General Information Comments: RD follow up. Spoke about renal, gluten free, and bland diet. Patient is drinking ONS. PO intake reports as "could be better". Estimating around 25% of meals eaten, and drinking ONS with each meal. Patient receives dialysis. Enjoys chicken broth - requesting on trays. Prefers bland foods - chicken and fish without spices. Spoke with family members bedside about patient's food preferences as well. Discharging today.  Nutrition Discharge Planning: Renal diet    Nutrition Risk Screen    Nutrition Risk Screen: no indicators present    Nutrition/Diet History    Patient Reported Diet/Restrictions/Preferences: gluten-free, diabetic diet, renal (Low potassium)  Food Preferences: No potatoes, grits, high potassium foods. Drink smoothies, eats strawberries, low sodium chicken broth, cabbage, carrots OK; Broiled fish/chicken  Spiritual, Cultural Beliefs, Nondenominational Practices, Values that Affect Care: no  Food Allergies: wheat (gluten)  Factors Affecting Nutritional " "Intake: decreased appetite    Anthropometrics    Temp: 97.8 °F (36.6 °C)  Height Method: Stated  Height: 5' 5" (165.1 cm)  Height (inches): 65 in  Weight Method: Bed Scale  Weight: 67.6 kg (149 lb)  Weight (lb): 149 lb  Ideal Body Weight (IBW), Female: 125 lb  % Ideal Body Weight, Female (lb): 121.6 %  BMI (Calculated): 24.8  BMI Grade: 18.5-24.9 - normal       Lab/Procedures/Meds    Pertinent Labs Reviewed: reviewed  Pertinent Labs Comments: (2/6) Na 132 L, BUN 26 H, Crt 3.2 H, GFR 14.7, Glu 127 H, Ca 8.5 L, P 2.2 L, ALT 6 L  Pertinent Medications Reviewed: reviewed  Pertinent Medications Comments: lopressor, senna-docusate    Estimated/Assessed Needs    Weight Used For Calorie Calculations: 57 kg (125 lb 10.6 oz) (d/t edema present)  Energy Calorie Requirements (kcal): 1710- 1995 kcal  Energy Need Method: Kcal/kg (30-35 kcal/kg of IBW)  Protein Requirements: 86- 114g (1.5-2.0g/kg of IBW)  Weight Used For Protein Calculations: 57 kg (125 lb 10.6 oz) (IBW d/t edema present)        RDA Method (mL): 1710  CHO Requirement: 214- 249 g      Nutrition Prescription Ordered    Current Diet Order: Regular Diet  Nutrition Order Comments: Gluten free, bland  Oral Nutrition Supplement: NSR TID    Evaluation of Received Nutrient/Fluid Intake    I/O: -5.4 L since 1/23/24  Energy Calories Required: not meeting needs  Protein Required: not meeting needs  Fluid Required:  (as per MD)  Comments: LBM: 2/6  Tolerance: tolerating  % Intake of Estimated Energy Needs: 50 - 75 %  % Meal Intake: 25 - 50 %    Nutrition Risk    Level of Risk/Frequency of Follow-up:  (1x/week)     Monitor and Evaluation    Food and Nutrient Intake: energy intake, food and beverage intake  Food and Nutrient Adminstration: diet order  Knowledge/Beliefs/Attitudes: food and nutrition knowledge/skill, beliefs and attitudes  Physical Activity and Function: nutrition-related ADLs and IADLs  Anthropometric Measurements: height/length, weight, body mass index, weight " change  Biochemical Data, Medical Tests and Procedures: electrolyte and renal panel, gastrointestinal profile, glucose/endocrine profile, inflammatory profile, lipid profile  Nutrition-Focused Physical Findings: overall appearance     Nutrition Follow-Up    RD Follow-up?: Yes

## 2024-02-06 NOTE — PROGRESS NOTES
Jeovanny Dow - [facility]  Thoracic Surgery  Progress Note    Subjective:     History of Present Illness:  No notes on file    Post-Op Info:  Procedure(s) (LRB):  EGD (ESOPHAGOGASTRODUODENOSCOPY) (N/A)   11 Days Post-Op     Interval History:   -NAEO  -Discharge to SNF put on hold by  yesterday  -Continuing to feel better    Medications:  Continuous Infusions:  Scheduled Meds:   acetylcysteine 100 mg/ml (10%)  4 mL Nebulization TID WAKE    cefpodoxime  200 mg Oral QHS    droNABinol  5 mg Oral BID    erythromycin ethylsuccinate  200 mg Oral QID (WM & HS)    fluconazole  200 mg Oral QHS    heparin (porcine)  5,000 Units Subcutaneous Q8H    LIDOcaine  1 patch Transdermal Q24H    metoprolol tartrate  12.5 mg Oral BID    metroNIDAZOLE  500 mg Oral Q12H    midodrine  10 mg Oral BID WM    oxybutynin  5 mg Oral TID    pantoprazole  40 mg Oral BID AC    potassium, sodium phosphates  1 packet Oral Once    senna-docusate 8.6-50 mg  1 tablet Oral Daily    sodium chloride 0.9%  10 mL Intravenous Q6H    venlafaxine  37.5 mg Oral Daily     PRN Meds:sodium chloride 0.9%, acetaminophen, albuterol sulfate, bisacodyL, dextrose 10%, dextrose 10%, glucagon (human recombinant), glucose, glucose, heparin (porcine), hydrALAZINE, insulin aspart U-100, midodrine, ondansetron, prochlorperazine, sodium chloride 0.9%, Flushing PICC/Midline Protocol **AND** sodium chloride 0.9% **AND** sodium chloride 0.9%     Review of patient's allergies indicates:   Allergen Reactions    Crestor [rosuvastatin] Swelling    Gluten protein      Objective:     Vital Signs (Most Recent):  Temp: 97.8 °F (36.6 °C) (02/06/24 0352)  Pulse: 81 (02/06/24 0706)  Resp: 18 (02/06/24 0352)  BP: 117/63 (02/06/24 0352)  SpO2: 97 % (02/06/24 0352) Vital Signs (24h Range):  Temp:  [97.4 °F (36.3 °C)-98.4 °F (36.9 °C)] 97.8 °F (36.6 °C)  Pulse:  [72-95] 81  Resp:  [16-20] 18  SpO2:  [94 %-99 %] 97 %  BP: (117-190)/(63-90) 117/63     Intake/Output - Last 3 Shifts         02/04  0700  02/05 0659 02/05 0700  02/06 0659 02/06 0700  02/07 0659    P.O. 850      Total Intake(mL/kg) 850 (12.6)      Other  2600     Stool  0     Total Output  2600     Net +850 -2600            Urine Occurrence 1 x      Stool Occurrence 2 x 0 x     Emesis Occurrence 0 x              SpO2: 97 %        Physical Exam  Vitals and nursing note reviewed.   Constitutional:       Appearance: Normal appearance.   HENT:      Head: Normocephalic and atraumatic.      Nose: Nose normal.   Cardiovascular:      Rate and Rhythm: Normal rate and regular rhythm.      Pulses: Normal pulses.   Pulmonary:      Effort: Pulmonary effort is normal. No respiratory distress.      Comments: Right posterior chest incision c/d/I  Right chest permacath    Abdominal:      General: Abdomen is flat. There is no distension.      Palpations: Abdomen is soft.      Tenderness: There is no abdominal tenderness.      Comments: Soft, nontender abdomen   Musculoskeletal:      Right lower leg: No edema.      Left lower leg: No edema.   Skin:     General: Skin is warm and dry.      Comments: Groin soft, dressings c/d/i   Neurological:      General: No focal deficit present.      Mental Status: She is alert.            Significant Labs:  CBC:   Recent Labs   Lab 02/06/24  0335   WBC 5.92   RBC 2.94*   HGB 8.8*   HCT 26.4*      MCV 90   MCH 29.9   MCHC 33.3       Significant Diagnostics:  I have reviewed all pertinent imaging results/findings within the past 24 hours.    VTE Risk Mitigation (From admission, onward)           Ordered     heparin (porcine) injection 1,000 Units  As needed (PRN)         02/01/24 1244     heparin (porcine) injection 5,000 Units  Every 8 hours         01/29/24 0800     IP VTE HIGH RISK PATIENT  Once         01/23/24 1527     heparin 25,000 units in dextrose 5% 250 mL (100 units/mL) infusion LOW INTENSITY nomogram - [facility]  Continuous        Question:  Begin at (units/kg/hr)  Answer:  16    01/15/24 1038     heparin (porcine)  injection 1,000 Units  As needed (PRN)         01/11/24 1351     heparin (porcine) injection 1,000 Units  As needed (PRN)         01/03/24 1123     Place CARMELITA hose  Until discontinued         01/02/24 1226     Place sequential compression device  Until discontinued         01/02/24 1226                  Assessment/Plan:     * Malignant carcinoid tumor of bronchus  72 yo female with ESRD (TTS), DDD, benign essential tremor, meniere's disease, HTN, HLD, DM2, GERD, Celiac's, IBS and 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 Stepped up to the ICU 1/5 for afib RVR which was refractory to medical management and c/b development of tachy-eulogio syndrome now s/p micra placement 1/16 for tachy-eulogio syndrome in the setting of RVR. Did have brief SBO type picture 1/7 which resolved with conservative management.     - s/p R thoracotomy with R lower lobectomy for carcinoid w/ MLND; IPV injury requiring repair. CT removed post operatively.  - Refractory afib RVR requiring ICU step up 1/05, s/p chemical cardioversion initially with recurrence c/b developing tachy-eulogio syndrome    - cards and EP following, appreciate assistance            - Plan for holter monitor 30 days after discharge followed by EP outpatient follow up    - s/p TVP placement and now micra implantation 1/16    - Heparin gtt resumed 1/22; will need to transition to eliquis likely following CT removal; held 1/23 2/2 GI bleed     - Heparin ppx 1/29  - s/p R chest tube placement with IR 1/17, L chest tube placement 1/18; L CT removed 1/25; Last R CT out 1/28  - Pulmonary toilet: IS, Acapella, Mucomyst daily; CPT ordered  - Infectious work up 2/2 leukocytosis. She remains afebrile. Effusions and compressive atelectasis also likely contributing to leukocytosis.Trended up to 30k 1/16, now downtrending. Zosyn 1/11-1/15; Meropenam 1/16 - 1/20; Plan for Cefpodoxime and metronidazole to start 1/21  per ID. 4 week abx plan for empiric treatment of presumed parapneumonic effusion - end date 2/15/2024.    - ID following, appreciate assistance. Reconsult following thora results.   - Bcx 1/11: Staph, probable contaminant  - Bcx1/12: No growth  - Rapid blood PCR: staph epi  - Sputum Cx 1/11: klebsiella & e. Coli  - R pleural fluid Cx 1/17: No growth (fungal pending still, neg KOH)  - SBO type episode 1/07 requiring NGT decompression, which resolved with conservative management   - GI bleed 1/23/2024 - heparin held, 2uPRBC, 1uFFP, heparin reversed. GI consulted                       - EGD 1/24 - could not visualize stomach much 2/2 food residue; EGD aborted                       - 2u PRBC overnight 1/25 for Hgb 6.3, slow downtrend. Repeat EGD 1/26 demonstrates non-bleeding ulcers                  - Continue protonix. GI signed off. Repeat scope in 8 weeks. F/u H pylori   - Diabetic diet diet, novasource renal BID; marinol for appetite.   - Bowel regimen, PPI  - Nephrology following, appreciate assistance. CRRT 1/17 overnight. HD trial 1/19 passed but requiring midodrine, 1.5 L LR, metoprolol for RVR. HD 1/26           - LUE AVF created 10.2023, US 1/22/2023 with good flow metrics, okay to use per vascular   - Oxybutinin for bladder spasms   - MM pain control as needed  - Continue PT/OT, mobilization, likely post hospital placement     Dispo: Discharge to SNF today. She is medically ready        Mike Lam MD  Thoracic Surgery  [facility]

## 2024-02-06 NOTE — SUBJECTIVE & OBJECTIVE
"Interval HPI:   No acute events overnight. Patient in room 1051/1051 A. Blood glucose stable. BG below goal on current insulin regimen (SSI, prandial, and basal insulin ). Steroid use- None.   11 Days Post-Op  Renal function- Abnormal - 3.2 cr   Vasopressors-  None     Diet Percy Gluten Free     Eatin%  Nausea: No  Hypoglycemia and intervention: No  Fever: No  TPN and/or TF: No      BP (!) 120/59   Pulse 81   Temp 98.1 °F (36.7 °C)   Resp 18   Ht 5' 5" (1.651 m)   Wt 67.6 kg (149 lb)   LMP  (LMP Unknown) Comment: BEAU/ NO BSO  1986  SpO2 97%   Breastfeeding No   BMI 24.79 kg/m²     Labs Reviewed and Include    Recent Labs   Lab 24  0335   *   CALCIUM 8.5*   ALBUMIN 2.0*   PROT 5.6*   *   K 3.9   CO2 21*      BUN 26*   CREATININE 3.2*   ALKPHOS 86   ALT 6*   AST 17   BILITOT 0.4     Lab Results   Component Value Date    WBC 5.92 2024    HGB 8.8 (L) 2024    HCT 26.4 (L) 2024    MCV 90 2024     2024     No results for input(s): "TSH", "FREET4" in the last 168 hours.  Lab Results   Component Value Date    HGBA1C 4.9 11/15/2023       Nutritional status:   Body mass index is 24.79 kg/m².  Lab Results   Component Value Date    ALBUMIN 2.0 (L) 2024    ALBUMIN 2.2 (L) 2024    ALBUMIN 2.2 (L) 2024     No results found for: "PREALBUMIN"    Estimated Creatinine Clearance: 14.1 mL/min (A) (based on SCr of 3.2 mg/dL (H)).    Accu-Checks  Recent Labs     24  1513 24  0736 24  1133 24  1640 24  1125 24  1644 24  1213   POCTGLUCOSE 171* 130* 123* 211* 114* 201* 221*       Current Medications and/or Treatments Impacting Glycemic Control  Immunotherapy:    Immunosuppressants       None          Steroids:   Hormones (From admission, onward)      None          Pressors:    Autonomic Drugs (From admission, onward)      Start     Stop Route Frequency Ordered    24 2890  midodrine tablet 10 mg      "    -- Oral 2 times daily with meals 01/23/24 1315    01/22/24 1045  metoprolol tartrate (LOPRESSOR) split tablet 12.5 mg         -- Oral 2 times daily 01/22/24 0932    01/22/24 0930  midodrine tablet 15 mg         -- Oral As needed (PRN) 01/22/24 0930          Hyperglycemia/Diabetes Medications:   Antihyperglycemics (From admission, onward)      Start     Stop Route Frequency Ordered    01/19/24 1232  insulin aspart U-100 pen 0-10 Units         -- SubQ Before meals & nightly PRN 01/19/24 1232

## 2024-02-06 NOTE — HOSPITAL COURSE
The hospital course of Lily Green was prolonged and complex. Please see medical record for full details.    In brief, she presented on 1/2/24 for right lower lobectomy for resection of her carcinoid tumor and MLND. This ultimately required a thoracotomy. She was admitted to the floor postoperatively with a right sided chest tube to water seal. This chest tube was able to be removed without issue. 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 She was anticoagulated with a heparin gtt throughout this arrhythmia until she developed a GI bleed on 1/23. She underwent an EGD which showed nonbleeding ulcers. Her anticoagulation was discussed amongst services and it was decided that the risks outweighed the benefits, so it was stopped. Her discharge needs including Holter monitor for 30 days and SNF acceptance were able to be obtained and she met all discharge milestones on 2/6/24. She was able to be discharged to SNF with follow up in clinic.

## 2024-02-06 NOTE — SUBJECTIVE & OBJECTIVE
Interval History:   -NAEO  -Discharge to SNF put on hold by  yesterday  -Continuing to feel better    Medications:  Continuous Infusions:  Scheduled Meds:   acetylcysteine 100 mg/ml (10%)  4 mL Nebulization TID WAKE    cefpodoxime  200 mg Oral QHS    droNABinol  5 mg Oral BID    erythromycin ethylsuccinate  200 mg Oral QID (WM & HS)    fluconazole  200 mg Oral QHS    heparin (porcine)  5,000 Units Subcutaneous Q8H    LIDOcaine  1 patch Transdermal Q24H    metoprolol tartrate  12.5 mg Oral BID    metroNIDAZOLE  500 mg Oral Q12H    midodrine  10 mg Oral BID WM    oxybutynin  5 mg Oral TID    pantoprazole  40 mg Oral BID AC    potassium, sodium phosphates  1 packet Oral Once    senna-docusate 8.6-50 mg  1 tablet Oral Daily    sodium chloride 0.9%  10 mL Intravenous Q6H    venlafaxine  37.5 mg Oral Daily     PRN Meds:sodium chloride 0.9%, acetaminophen, albuterol sulfate, bisacodyL, dextrose 10%, dextrose 10%, glucagon (human recombinant), glucose, glucose, heparin (porcine), hydrALAZINE, insulin aspart U-100, midodrine, ondansetron, prochlorperazine, sodium chloride 0.9%, Flushing PICC/Midline Protocol **AND** sodium chloride 0.9% **AND** sodium chloride 0.9%     Review of patient's allergies indicates:   Allergen Reactions    Crestor [rosuvastatin] Swelling    Gluten protein      Objective:     Vital Signs (Most Recent):  Temp: 97.8 °F (36.6 °C) (02/06/24 0352)  Pulse: 81 (02/06/24 0706)  Resp: 18 (02/06/24 0352)  BP: 117/63 (02/06/24 0352)  SpO2: 97 % (02/06/24 0352) Vital Signs (24h Range):  Temp:  [97.4 °F (36.3 °C)-98.4 °F (36.9 °C)] 97.8 °F (36.6 °C)  Pulse:  [72-95] 81  Resp:  [16-20] 18  SpO2:  [94 %-99 %] 97 %  BP: (117-190)/(63-90) 117/63     Intake/Output - Last 3 Shifts         02/04 0700 02/05 0659 02/05 0700 02/06 0659 02/06 0700  02/07 0659    P.O. 850      Total Intake(mL/kg) 850 (12.6)      Other  2600     Stool  0     Total Output  2600     Net +850 -2600            Urine Occurrence 1 x       Stool Occurrence 2 x 0 x     Emesis Occurrence 0 x              SpO2: 97 %        Physical Exam  Vitals and nursing note reviewed.   Constitutional:       Appearance: Normal appearance.   HENT:      Head: Normocephalic and atraumatic.      Nose: Nose normal.   Cardiovascular:      Rate and Rhythm: Normal rate and regular rhythm.      Pulses: Normal pulses.   Pulmonary:      Effort: Pulmonary effort is normal. No respiratory distress.      Comments: Right posterior chest incision c/d/I  Right chest permacath    Abdominal:      General: Abdomen is flat. There is no distension.      Palpations: Abdomen is soft.      Tenderness: There is no abdominal tenderness.      Comments: Soft, nontender abdomen   Musculoskeletal:      Right lower leg: No edema.      Left lower leg: No edema.   Skin:     General: Skin is warm and dry.      Comments: Groin soft, dressings c/d/i   Neurological:      General: No focal deficit present.      Mental Status: She is alert.            Significant Labs:  CBC:   Recent Labs   Lab 02/06/24  0335   WBC 5.92   RBC 2.94*   HGB 8.8*   HCT 26.4*      MCV 90   MCH 29.9   MCHC 33.3       Significant Diagnostics:  I have reviewed all pertinent imaging results/findings within the past 24 hours.    VTE Risk Mitigation (From admission, onward)           Ordered     heparin (porcine) injection 1,000 Units  As needed (PRN)         02/01/24 1244     heparin (porcine) injection 5,000 Units  Every 8 hours         01/29/24 0800     IP VTE HIGH RISK PATIENT  Once         01/23/24 1527     heparin 25,000 units in dextrose 5% 250 mL (100 units/mL) infusion LOW INTENSITY nomogram - [facility]  Continuous        Question:  Begin at (units/kg/hr)  Answer:  16    01/15/24 1038     heparin (porcine) injection 1,000 Units  As needed (PRN)         01/11/24 1351     heparin (porcine) injection 1,000 Units  As needed (PRN)         01/03/24 1123     Place CARMELITA hose  Until discontinued         01/02/24 1226     Place  sequential compression device  Until discontinued         01/02/24 7967

## 2024-02-09 ENCOUNTER — TELEPHONE (OUTPATIENT)
Dept: ELECTROPHYSIOLOGY | Facility: CLINIC | Age: 74
End: 2024-02-09
Payer: MEDICARE

## 2024-02-12 ENCOUNTER — HOSPITAL ENCOUNTER (INPATIENT)
Facility: HOSPITAL | Age: 74
LOS: 18 days | Discharge: NURSING, REHAB OR LONG-TERM CARE | DRG: 286 | End: 2024-03-01
Attending: EMERGENCY MEDICINE | Admitting: INTERNAL MEDICINE
Payer: MEDICARE

## 2024-02-12 DIAGNOSIS — Z71.89 GOALS OF CARE, COUNSELING/DISCUSSION: ICD-10-CM

## 2024-02-12 DIAGNOSIS — I50.9 CHF (CONGESTIVE HEART FAILURE): ICD-10-CM

## 2024-02-12 DIAGNOSIS — I50.9 NEW ONSET OF CONGESTIVE HEART FAILURE: ICD-10-CM

## 2024-02-12 DIAGNOSIS — N18.6 ESRD (END STAGE RENAL DISEASE): ICD-10-CM

## 2024-02-12 DIAGNOSIS — D63.1 ANEMIA IN ESRD (END-STAGE RENAL DISEASE): ICD-10-CM

## 2024-02-12 DIAGNOSIS — R42 DIZZINESS: ICD-10-CM

## 2024-02-12 DIAGNOSIS — R34 ANURIA: ICD-10-CM

## 2024-02-12 DIAGNOSIS — K21.9 GASTROESOPHAGEAL REFLUX DISEASE WITHOUT ESOPHAGITIS: ICD-10-CM

## 2024-02-12 DIAGNOSIS — I48.91 A-FIB: ICD-10-CM

## 2024-02-12 DIAGNOSIS — Z51.5 PALLIATIVE CARE ENCOUNTER: ICD-10-CM

## 2024-02-12 DIAGNOSIS — N18.6 ANEMIA IN ESRD (END-STAGE RENAL DISEASE): ICD-10-CM

## 2024-02-12 DIAGNOSIS — J96.01 ACUTE HYPOXEMIC RESPIRATORY FAILURE: Primary | ICD-10-CM

## 2024-02-12 DIAGNOSIS — E44.0 MODERATE MALNUTRITION: ICD-10-CM

## 2024-02-12 DIAGNOSIS — E78.2 HYPERLIPIDEMIA, MIXED: ICD-10-CM

## 2024-02-12 DIAGNOSIS — Z71.89 ADVANCED CARE PLANNING/COUNSELING DISCUSSION: ICD-10-CM

## 2024-02-12 DIAGNOSIS — E87.0 HYPERNATREMIA: ICD-10-CM

## 2024-02-12 DIAGNOSIS — R06.02 SHORTNESS OF BREATH: ICD-10-CM

## 2024-02-12 DIAGNOSIS — I50.9 ACUTE DECOMPENSATED HEART FAILURE: ICD-10-CM

## 2024-02-12 DIAGNOSIS — E87.1 HYPONATREMIA: ICD-10-CM

## 2024-02-12 DIAGNOSIS — I31.39 PERICARDIAL EFFUSION: ICD-10-CM

## 2024-02-12 PROBLEM — I48.92 ATRIAL FLUTTER: Status: ACTIVE | Noted: 2024-02-12

## 2024-02-12 PROBLEM — N25.81 SECONDARY HYPERPARATHYROIDISM: Status: ACTIVE | Noted: 2024-02-12

## 2024-02-12 LAB
ALBUMIN SERPL BCP-MCNC: 2.3 G/DL (ref 3.5–5.2)
ALBUMIN SERPL BCP-MCNC: 2.5 G/DL (ref 3.5–5.2)
ALBUMIN SERPL BCP-MCNC: 2.5 G/DL (ref 3.5–5.2)
ALLENS TEST: ABNORMAL
ALLENS TEST: NORMAL
ALP SERPL-CCNC: 101 U/L (ref 55–135)
ALP SERPL-CCNC: 101 U/L (ref 55–135)
ALT SERPL W/O P-5'-P-CCNC: 7 U/L (ref 10–44)
ALT SERPL W/O P-5'-P-CCNC: 8 U/L (ref 10–44)
ANION GAP SERPL CALC-SCNC: 13 MMOL/L (ref 8–16)
ANION GAP SERPL CALC-SCNC: 16 MMOL/L (ref 8–16)
ANION GAP SERPL CALC-SCNC: 8 MMOL/L (ref 8–16)
ANISOCYTOSIS BLD QL SMEAR: SLIGHT
ASCENDING AORTA: 3.35 CM
AST SERPL-CCNC: 17 U/L (ref 10–40)
AST SERPL-CCNC: 23 U/L (ref 10–40)
AV INDEX (PROSTH): 0.95
AV MEAN GRADIENT: 4 MMHG
AV PEAK GRADIENT: 5 MMHG
AV VALVE AREA BY VELOCITY RATIO: 2.58 CM²
AV VALVE AREA: 2.69 CM²
AV VELOCITY RATIO: 0.91
BASOPHILS # BLD AUTO: 0.03 K/UL (ref 0–0.2)
BASOPHILS NFR BLD: 0 % (ref 0–1.9)
BASOPHILS NFR BLD: 0.2 % (ref 0–1.9)
BILIRUB SERPL-MCNC: 0.3 MG/DL (ref 0.1–1)
BILIRUB SERPL-MCNC: 0.4 MG/DL (ref 0.1–1)
BNP SERPL-MCNC: 2754 PG/ML (ref 0–99)
BUN SERPL-MCNC: 18 MG/DL (ref 8–23)
BUN SERPL-MCNC: 21 MG/DL (ref 8–23)
BUN SERPL-MCNC: 34 MG/DL (ref 8–23)
CALCIUM SERPL-MCNC: 8.2 MG/DL (ref 8.7–10.5)
CALCIUM SERPL-MCNC: 8.9 MG/DL (ref 8.7–10.5)
CALCIUM SERPL-MCNC: 9.5 MG/DL (ref 8.7–10.5)
CHLORIDE SERPL-SCNC: 93 MMOL/L (ref 95–110)
CHLORIDE SERPL-SCNC: 97 MMOL/L (ref 95–110)
CHLORIDE SERPL-SCNC: 99 MMOL/L (ref 95–110)
CO2 SERPL-SCNC: 24 MMOL/L (ref 23–29)
CO2 SERPL-SCNC: 24 MMOL/L (ref 23–29)
CO2 SERPL-SCNC: 25 MMOL/L (ref 23–29)
CREAT SERPL-MCNC: 2.2 MG/DL (ref 0.5–1.4)
CREAT SERPL-MCNC: 2.6 MG/DL (ref 0.5–1.4)
CREAT SERPL-MCNC: 4 MG/DL (ref 0.5–1.4)
CV ECHO LV RWT: 0.45 CM
DELSYS: ABNORMAL
DELSYS: NORMAL
DIFFERENTIAL METHOD BLD: ABNORMAL
DIFFERENTIAL METHOD BLD: ABNORMAL
DOHLE BOD BLD QL SMEAR: PRESENT
DOP CALC AO PEAK VEL: 1.11 M/S
DOP CALC AO VTI: 12.8 CM
DOP CALC LVOT AREA: 2.8 CM2
DOP CALC LVOT DIAMETER: 1.9 CM
DOP CALC LVOT PEAK VEL: 1.01 M/S
DOP CALC LVOT STROKE VOLUME: 34.4 CM3
DOP CALCLVOT PEAK VEL VTI: 12.14 CM
ECHO LV POSTERIOR WALL: 1.07 CM (ref 0.6–1.1)
EJECTION FRACTION: 15 %
EOSINOPHIL # BLD AUTO: 0 K/UL (ref 0–0.5)
EOSINOPHIL NFR BLD: 0 % (ref 0–8)
EOSINOPHIL NFR BLD: 0 % (ref 0–8)
EP: 10
ERYTHROCYTE [DISTWIDTH] IN BLOOD BY AUTOMATED COUNT: 22.6 % (ref 11.5–14.5)
ERYTHROCYTE [DISTWIDTH] IN BLOOD BY AUTOMATED COUNT: 23 % (ref 11.5–14.5)
EST. GFR  (NO RACE VARIABLE): 11.3 ML/MIN/1.73 M^2
EST. GFR  (NO RACE VARIABLE): 18.9 ML/MIN/1.73 M^2
EST. GFR  (NO RACE VARIABLE): 23.1 ML/MIN/1.73 M^2
FIO2: 24
FIO2: 40
FRACTIONAL SHORTENING: 18 % (ref 28–44)
GIANT PLATELETS BLD QL SMEAR: PRESENT
GLUCOSE SERPL-MCNC: 170 MG/DL (ref 70–110)
GLUCOSE SERPL-MCNC: 178 MG/DL (ref 70–110)
GLUCOSE SERPL-MCNC: 266 MG/DL (ref 70–110)
HAV IGM SERPL QL IA: NORMAL
HBV CORE IGM SERPL QL IA: NORMAL
HBV CORE IGM SERPL QL IA: NORMAL
HBV SURFACE AG SERPL QL IA: NORMAL
HBV SURFACE AG SERPL QL IA: NORMAL
HCO3 UR-SCNC: 27.5 MMOL/L (ref 24–28)
HCO3 UR-SCNC: 30.1 MMOL/L (ref 24–28)
HCT VFR BLD AUTO: 33 % (ref 37–48.5)
HCT VFR BLD AUTO: 33.4 % (ref 37–48.5)
HCV AB SERPL QL IA: NORMAL
HGB BLD-MCNC: 10.5 G/DL (ref 12–16)
HGB BLD-MCNC: 10.6 G/DL (ref 12–16)
IMM GRANULOCYTES # BLD AUTO: 0.07 K/UL (ref 0–0.04)
IMM GRANULOCYTES # BLD AUTO: ABNORMAL K/UL (ref 0–0.04)
IMM GRANULOCYTES NFR BLD AUTO: 0.6 % (ref 0–0.5)
IMM GRANULOCYTES NFR BLD AUTO: ABNORMAL % (ref 0–0.5)
INTERVENTRICULAR SEPTUM: 0.8 CM (ref 0.6–1.1)
IP: 16
LA MAJOR: 5.32 CM
LA MINOR: 4.47 CM
LA WIDTH: 4.15 CM
LACTATE SERPL-SCNC: 1.8 MMOL/L (ref 0.5–2.2)
LDH SERPL L TO P-CCNC: 2.17 MMOL/L (ref 0.5–2.2)
LEFT ATRIUM SIZE: 3.69 CM
LEFT ATRIUM VOLUME: 63.24 CM3
LEFT INTERNAL DIMENSION IN SYSTOLE: 3.9 CM (ref 2.1–4)
LEFT VENTRICLE DIASTOLIC VOLUME: 103.69 ML
LEFT VENTRICLE SYSTOLIC VOLUME: 66.01 ML
LEFT VENTRICULAR INTERNAL DIMENSION IN DIASTOLE: 4.73 CM (ref 3.5–6)
LEFT VENTRICULAR MASS: 151.75 G
LYMPHOCYTES # BLD AUTO: 0.6 K/UL (ref 1–4.8)
LYMPHOCYTES NFR BLD: 3 % (ref 18–48)
LYMPHOCYTES NFR BLD: 5 % (ref 18–48)
MAGNESIUM SERPL-MCNC: 1.8 MG/DL (ref 1.6–2.6)
MAGNESIUM SERPL-MCNC: 1.8 MG/DL (ref 1.6–2.6)
MCH RBC QN AUTO: 29.8 PG (ref 27–31)
MCH RBC QN AUTO: 30 PG (ref 27–31)
MCHC RBC AUTO-ENTMCNC: 31.7 G/DL (ref 32–36)
MCHC RBC AUTO-ENTMCNC: 31.8 G/DL (ref 32–36)
MCV RBC AUTO: 94 FL (ref 82–98)
MCV RBC AUTO: 94 FL (ref 82–98)
MODE: ABNORMAL
MODE: NORMAL
MONOCYTES # BLD AUTO: 1.3 K/UL (ref 0.3–1)
MONOCYTES NFR BLD: 11.1 % (ref 4–15)
MONOCYTES NFR BLD: 4 % (ref 4–15)
NEUTROPHILS # BLD AUTO: 10 K/UL (ref 1.8–7.7)
NEUTROPHILS NFR BLD: 83.1 % (ref 38–73)
NEUTROPHILS NFR BLD: 92 % (ref 38–73)
NEUTS BAND NFR BLD MANUAL: 1 %
NRBC BLD-RTO: 0 /100 WBC
NRBC BLD-RTO: 0 /100 WBC
OHS QRS DURATION: 76 MS
OHS QRS DURATION: 78 MS
OHS QTC CALCULATION: 414 MS
OHS QTC CALCULATION: 441 MS
OVALOCYTES BLD QL SMEAR: ABNORMAL
PCO2 BLDA: 38.9 MMHG (ref 35–45)
PCO2 BLDA: 44.1 MMHG (ref 35–45)
PEEP: 12
PH SMN: 7.44 [PH] (ref 7.35–7.45)
PH SMN: 7.46 [PH] (ref 7.35–7.45)
PHOSPHATE SERPL-MCNC: 1.6 MG/DL (ref 2.7–4.5)
PHOSPHATE SERPL-MCNC: 1.6 MG/DL (ref 2.7–4.5)
PLATELET # BLD AUTO: 207 K/UL (ref 150–450)
PLATELET # BLD AUTO: 334 K/UL (ref 150–450)
PLATELET BLD QL SMEAR: ABNORMAL
PLATELET BLD QL SMEAR: ABNORMAL
PMV BLD AUTO: 10.6 FL (ref 9.2–12.9)
PMV BLD AUTO: 9.7 FL (ref 9.2–12.9)
PO2 BLDA: 16 MMHG (ref 40–60)
PO2 BLDA: 28 MMHG (ref 40–60)
PO2 BLDA: 29 MMHG (ref 40–60)
POC BE: 4 MMOL/L
POC BE: 6 MMOL/L
POC SATURATED O2: 23 % (ref 95–100)
POC SATURATED O2: 56 % (ref 95–100)
POC SATURATED O2: 59 % (ref 95–100)
POC TCO2: 29 MMOL/L (ref 24–29)
POC TCO2: 31 MMOL/L (ref 24–29)
POCT GLUCOSE: 226 MG/DL (ref 70–110)
POIKILOCYTOSIS BLD QL SMEAR: SLIGHT
POTASSIUM SERPL-SCNC: 3.6 MMOL/L (ref 3.5–5.1)
POTASSIUM SERPL-SCNC: 3.8 MMOL/L (ref 3.5–5.1)
POTASSIUM SERPL-SCNC: 4.1 MMOL/L (ref 3.5–5.1)
PROT SERPL-MCNC: 7.2 G/DL (ref 6–8.4)
PROT SERPL-MCNC: 7.5 G/DL (ref 6–8.4)
RA MAJOR: 5.1 CM
RA PRESSURE ESTIMATED: 8 MMHG
RA WIDTH: 3.99 CM
RBC # BLD AUTO: 3.5 M/UL (ref 4–5.4)
RBC # BLD AUTO: 3.56 M/UL (ref 4–5.4)
RIGHT VENTRICULAR END-DIASTOLIC DIMENSION: 2.27 CM
SAMPLE: ABNORMAL
SAMPLE: NORMAL
SARS-COV-2 RDRP RESP QL NAA+PROBE: NEGATIVE
SINUS: 2.98 CM
SITE: ABNORMAL
SITE: NORMAL
SODIUM SERPL-SCNC: 131 MMOL/L (ref 136–145)
SODIUM SERPL-SCNC: 133 MMOL/L (ref 136–145)
SODIUM SERPL-SCNC: 135 MMOL/L (ref 136–145)
SPHEROCYTES BLD QL SMEAR: ABNORMAL
STJ: 2.91 CM
T4 FREE SERPL-MCNC: 0.88 NG/DL (ref 0.71–1.51)
TOXIC GRANULES BLD QL SMEAR: PRESENT
TRICUSPID ANNULAR PLANE SYSTOLIC EXCURSION: 1.34 CM
TROPONIN I SERPL DL<=0.01 NG/ML-MCNC: 0.31 NG/ML (ref 0–0.03)
TROPONIN I SERPL DL<=0.01 NG/ML-MCNC: 2.02 NG/ML (ref 0–0.03)
TROPONIN I SERPL DL<=0.01 NG/ML-MCNC: 2.06 NG/ML (ref 0–0.03)
TROPONIN I SERPL DL<=0.01 NG/ML-MCNC: 2.12 NG/ML (ref 0–0.03)
TSH SERPL DL<=0.005 MIU/L-ACNC: 18.18 UIU/ML (ref 0.4–4)
WBC # BLD AUTO: 11.47 K/UL (ref 3.9–12.7)
WBC # BLD AUTO: 12.03 K/UL (ref 3.9–12.7)

## 2024-02-12 PROCEDURE — 99291 CRITICAL CARE FIRST HOUR: CPT

## 2024-02-12 PROCEDURE — 63600175 PHARM REV CODE 636 W HCPCS: Performed by: NURSE PRACTITIONER

## 2024-02-12 PROCEDURE — 25000003 PHARM REV CODE 250: Performed by: INTERNAL MEDICINE

## 2024-02-12 PROCEDURE — 83880 ASSAY OF NATRIURETIC PEPTIDE: CPT

## 2024-02-12 PROCEDURE — 99291 CRITICAL CARE FIRST HOUR: CPT | Mod: ,,, | Performed by: INTERNAL MEDICINE

## 2024-02-12 PROCEDURE — 84484 ASSAY OF TROPONIN QUANT: CPT | Mod: 91 | Performed by: INTERNAL MEDICINE

## 2024-02-12 PROCEDURE — 63600175 PHARM REV CODE 636 W HCPCS: Performed by: INTERNAL MEDICINE

## 2024-02-12 PROCEDURE — 84443 ASSAY THYROID STIM HORMONE: CPT | Performed by: INTERNAL MEDICINE

## 2024-02-12 PROCEDURE — 90945 DIALYSIS ONE EVALUATION: CPT

## 2024-02-12 PROCEDURE — 84439 ASSAY OF FREE THYROXINE: CPT | Performed by: INTERNAL MEDICINE

## 2024-02-12 PROCEDURE — 25500020 PHARM REV CODE 255: Performed by: INTERNAL MEDICINE

## 2024-02-12 PROCEDURE — 82803 BLOOD GASES ANY COMBINATION: CPT

## 2024-02-12 PROCEDURE — 84100 ASSAY OF PHOSPHORUS: CPT | Performed by: INTERNAL MEDICINE

## 2024-02-12 PROCEDURE — 99233 SBSQ HOSP IP/OBS HIGH 50: CPT | Mod: GC,,, | Performed by: INTERNAL MEDICINE

## 2024-02-12 PROCEDURE — 63600175 PHARM REV CODE 636 W HCPCS

## 2024-02-12 PROCEDURE — U0002 COVID-19 LAB TEST NON-CDC: HCPCS | Performed by: EMERGENCY MEDICINE

## 2024-02-12 PROCEDURE — 94640 AIRWAY INHALATION TREATMENT: CPT

## 2024-02-12 PROCEDURE — 99900035 HC TECH TIME PER 15 MIN (STAT)

## 2024-02-12 PROCEDURE — 5A1D90Z PERFORMANCE OF URINARY FILTRATION, CONTINUOUS, GREATER THAN 18 HOURS PER DAY: ICD-10-PCS | Performed by: INTERNAL MEDICINE

## 2024-02-12 PROCEDURE — 87040 BLOOD CULTURE FOR BACTERIA: CPT

## 2024-02-12 PROCEDURE — 63600175 PHARM REV CODE 636 W HCPCS: Performed by: EMERGENCY MEDICINE

## 2024-02-12 PROCEDURE — 85025 COMPLETE CBC W/AUTO DIFF WBC: CPT | Mod: 91 | Performed by: INTERNAL MEDICINE

## 2024-02-12 PROCEDURE — 5A0945A ASSISTANCE WITH RESPIRATORY VENTILATION, 24-96 CONSECUTIVE HOURS, HIGH NASAL FLOW/VELOCITY: ICD-10-PCS | Performed by: INTERNAL MEDICINE

## 2024-02-12 PROCEDURE — 83605 ASSAY OF LACTIC ACID: CPT

## 2024-02-12 PROCEDURE — 25000003 PHARM REV CODE 250: Performed by: STUDENT IN AN ORGANIZED HEALTH CARE EDUCATION/TRAINING PROGRAM

## 2024-02-12 PROCEDURE — 63600175 PHARM REV CODE 636 W HCPCS: Performed by: STUDENT IN AN ORGANIZED HEALTH CARE EDUCATION/TRAINING PROGRAM

## 2024-02-12 PROCEDURE — 25000003 PHARM REV CODE 250

## 2024-02-12 PROCEDURE — 83735 ASSAY OF MAGNESIUM: CPT | Performed by: INTERNAL MEDICINE

## 2024-02-12 PROCEDURE — 84484 ASSAY OF TROPONIN QUANT: CPT | Mod: 91

## 2024-02-12 PROCEDURE — 27100171 HC OXYGEN HIGH FLOW UP TO 24 HOURS

## 2024-02-12 PROCEDURE — 94660 CPAP INITIATION&MGMT: CPT

## 2024-02-12 PROCEDURE — 83605 ASSAY OF LACTIC ACID: CPT | Performed by: HOSPITALIST

## 2024-02-12 PROCEDURE — 80053 COMPREHEN METABOLIC PANEL: CPT | Mod: 91 | Performed by: INTERNAL MEDICINE

## 2024-02-12 PROCEDURE — 27000190 HC CPAP FULL FACE MASK W/VALVE

## 2024-02-12 PROCEDURE — 25000242 PHARM REV CODE 250 ALT 637 W/ HCPCS

## 2024-02-12 PROCEDURE — G0257 UNSCHED DIALYSIS ESRD PT HOS: HCPCS

## 2024-02-12 PROCEDURE — 84484 ASSAY OF TROPONIN QUANT: CPT

## 2024-02-12 PROCEDURE — 82800 BLOOD PH: CPT

## 2024-02-12 PROCEDURE — 93005 ELECTROCARDIOGRAM TRACING: CPT

## 2024-02-12 PROCEDURE — 85025 COMPLETE CBC W/AUTO DIFF WBC: CPT

## 2024-02-12 PROCEDURE — 80053 COMPREHEN METABOLIC PANEL: CPT

## 2024-02-12 PROCEDURE — 94761 N-INVAS EAR/PLS OXIMETRY MLT: CPT | Mod: XB

## 2024-02-12 PROCEDURE — 93010 ELECTROCARDIOGRAM REPORT: CPT | Mod: ,,, | Performed by: INTERNAL MEDICINE

## 2024-02-12 PROCEDURE — 5A09357 ASSISTANCE WITH RESPIRATORY VENTILATION, LESS THAN 24 CONSECUTIVE HOURS, CONTINUOUS POSITIVE AIRWAY PRESSURE: ICD-10-PCS | Performed by: INTERNAL MEDICINE

## 2024-02-12 PROCEDURE — 99223 1ST HOSP IP/OBS HIGH 75: CPT | Mod: ,,, | Performed by: INTERNAL MEDICINE

## 2024-02-12 PROCEDURE — 80074 ACUTE HEPATITIS PANEL: CPT | Performed by: STUDENT IN AN ORGANIZED HEALTH CARE EDUCATION/TRAINING PROGRAM

## 2024-02-12 PROCEDURE — 80069 RENAL FUNCTION PANEL: CPT | Performed by: STUDENT IN AN ORGANIZED HEALTH CARE EDUCATION/TRAINING PROGRAM

## 2024-02-12 PROCEDURE — 20000000 HC ICU ROOM

## 2024-02-12 PROCEDURE — 83735 ASSAY OF MAGNESIUM: CPT | Mod: 91 | Performed by: STUDENT IN AN ORGANIZED HEALTH CARE EDUCATION/TRAINING PROGRAM

## 2024-02-12 RX ORDER — GLUCAGON 1 MG
1 KIT INJECTION
Status: DISCONTINUED | OUTPATIENT
Start: 2024-02-12 | End: 2024-02-12

## 2024-02-12 RX ORDER — ACETAMINOPHEN 325 MG/1
650 TABLET ORAL EVERY 4 HOURS PRN
Status: DISCONTINUED | OUTPATIENT
Start: 2024-02-12 | End: 2024-03-01 | Stop reason: HOSPADM

## 2024-02-12 RX ORDER — TALC
6 POWDER (GRAM) TOPICAL NIGHTLY PRN
Status: DISCONTINUED | OUTPATIENT
Start: 2024-02-12 | End: 2024-03-01 | Stop reason: HOSPADM

## 2024-02-12 RX ORDER — SODIUM CHLORIDE 9 MG/ML
INJECTION, SOLUTION INTRAVENOUS CONTINUOUS
Status: DISCONTINUED | OUTPATIENT
Start: 2024-02-12 | End: 2024-02-20

## 2024-02-12 RX ORDER — DRONABINOL 5 MG/1
5 CAPSULE ORAL
Status: DISCONTINUED | OUTPATIENT
Start: 2024-02-12 | End: 2024-03-01 | Stop reason: HOSPADM

## 2024-02-12 RX ORDER — HEPARIN SODIUM 1000 [USP'U]/ML
1000 INJECTION, SOLUTION INTRAVENOUS; SUBCUTANEOUS
Status: DISPENSED | OUTPATIENT
Start: 2024-02-12 | End: 2024-02-13

## 2024-02-12 RX ORDER — IPRATROPIUM BROMIDE AND ALBUTEROL SULFATE 2.5; .5 MG/3ML; MG/3ML
3 SOLUTION RESPIRATORY (INHALATION) EVERY 4 HOURS PRN
Status: DISCONTINUED | OUTPATIENT
Start: 2024-02-12 | End: 2024-03-01 | Stop reason: HOSPADM

## 2024-02-12 RX ORDER — HYDROCODONE BITARTRATE AND ACETAMINOPHEN 500; 5 MG/1; MG/1
TABLET ORAL CONTINUOUS
Status: DISCONTINUED | OUTPATIENT
Start: 2024-02-12 | End: 2024-02-13

## 2024-02-12 RX ORDER — CEFPODOXIME PROXETIL 200 MG/1
200 TABLET, FILM COATED ORAL NIGHTLY
Status: DISCONTINUED | OUTPATIENT
Start: 2024-02-12 | End: 2024-02-12

## 2024-02-12 RX ORDER — IBUPROFEN 200 MG
16 TABLET ORAL
Status: DISCONTINUED | OUTPATIENT
Start: 2024-02-12 | End: 2024-03-01 | Stop reason: HOSPADM

## 2024-02-12 RX ORDER — IBUPROFEN 200 MG
24 TABLET ORAL
Status: DISCONTINUED | OUTPATIENT
Start: 2024-02-12 | End: 2024-02-12

## 2024-02-12 RX ORDER — FERROUS GLUCONATE 324(37.5)
324 TABLET ORAL
Status: DISCONTINUED | OUTPATIENT
Start: 2024-02-12 | End: 2024-03-01 | Stop reason: HOSPADM

## 2024-02-12 RX ORDER — SODIUM,POTASSIUM PHOSPHATES 280-250MG
2 POWDER IN PACKET (EA) ORAL
Status: COMPLETED | OUTPATIENT
Start: 2024-02-12 | End: 2024-02-13

## 2024-02-12 RX ORDER — IBUPROFEN 200 MG
16 TABLET ORAL
Status: DISCONTINUED | OUTPATIENT
Start: 2024-02-12 | End: 2024-02-12

## 2024-02-12 RX ORDER — GLUCAGON 1 MG
1 KIT INJECTION
Status: DISCONTINUED | OUTPATIENT
Start: 2024-02-12 | End: 2024-03-01 | Stop reason: HOSPADM

## 2024-02-12 RX ORDER — METOPROLOL TARTRATE 1 MG/ML
5 INJECTION, SOLUTION INTRAVENOUS ONCE
Status: COMPLETED | OUTPATIENT
Start: 2024-02-12 | End: 2024-02-12

## 2024-02-12 RX ORDER — IPRATROPIUM BROMIDE AND ALBUTEROL SULFATE 2.5; .5 MG/3ML; MG/3ML
SOLUTION RESPIRATORY (INHALATION)
Status: COMPLETED
Start: 2024-02-12 | End: 2024-02-12

## 2024-02-12 RX ORDER — FUROSEMIDE 10 MG/ML
80 INJECTION INTRAMUSCULAR; INTRAVENOUS
Status: COMPLETED | OUTPATIENT
Start: 2024-02-12 | End: 2024-02-12

## 2024-02-12 RX ORDER — ATORVASTATIN CALCIUM 40 MG/1
40 TABLET, FILM COATED ORAL NIGHTLY
Status: DISCONTINUED | OUTPATIENT
Start: 2024-02-12 | End: 2024-03-01 | Stop reason: HOSPADM

## 2024-02-12 RX ORDER — MUPIROCIN 20 MG/G
OINTMENT TOPICAL 2 TIMES DAILY
Status: COMPLETED | OUTPATIENT
Start: 2024-02-12 | End: 2024-02-16

## 2024-02-12 RX ORDER — METRONIDAZOLE 500 MG/1
500 TABLET ORAL EVERY 12 HOURS
Status: DISCONTINUED | OUTPATIENT
Start: 2024-02-12 | End: 2024-02-12

## 2024-02-12 RX ORDER — PREGABALIN 75 MG/1
75 CAPSULE ORAL DAILY
Status: DISCONTINUED | OUTPATIENT
Start: 2024-02-12 | End: 2024-02-15

## 2024-02-12 RX ORDER — IBUPROFEN 200 MG
24 TABLET ORAL
Status: DISCONTINUED | OUTPATIENT
Start: 2024-02-12 | End: 2024-03-01 | Stop reason: HOSPADM

## 2024-02-12 RX ORDER — HEPARIN SODIUM 5000 [USP'U]/ML
5000 INJECTION, SOLUTION INTRAVENOUS; SUBCUTANEOUS EVERY 8 HOURS
Status: DISCONTINUED | OUTPATIENT
Start: 2024-02-12 | End: 2024-02-26

## 2024-02-12 RX ORDER — VENLAFAXINE 37.5 MG/1
37.5 TABLET ORAL DAILY
Status: DISCONTINUED | OUTPATIENT
Start: 2024-02-12 | End: 2024-03-01 | Stop reason: HOSPADM

## 2024-02-12 RX ORDER — PANTOPRAZOLE SODIUM 40 MG/1
40 TABLET, DELAYED RELEASE ORAL
Status: DISCONTINUED | OUTPATIENT
Start: 2024-02-12 | End: 2024-03-01 | Stop reason: HOSPADM

## 2024-02-12 RX ORDER — MAGNESIUM SULFATE HEPTAHYDRATE 40 MG/ML
2 INJECTION, SOLUTION INTRAVENOUS
Status: DISCONTINUED | OUTPATIENT
Start: 2024-02-12 | End: 2024-02-13

## 2024-02-12 RX ORDER — SEVELAMER CARBONATE 800 MG/1
1600 TABLET, FILM COATED ORAL
Status: DISCONTINUED | OUTPATIENT
Start: 2024-02-12 | End: 2024-02-14

## 2024-02-12 RX ORDER — ONDANSETRON HYDROCHLORIDE 2 MG/ML
4 INJECTION, SOLUTION INTRAVENOUS EVERY 6 HOURS PRN
Status: DISCONTINUED | OUTPATIENT
Start: 2024-02-12 | End: 2024-03-01 | Stop reason: HOSPADM

## 2024-02-12 RX ORDER — METOPROLOL TARTRATE 25 MG/1
12.5 TABLET ORAL 2 TIMES DAILY
Status: DISCONTINUED | OUTPATIENT
Start: 2024-02-12 | End: 2024-02-14

## 2024-02-12 RX ORDER — SODIUM CHLORIDE 9 MG/ML
INJECTION, SOLUTION INTRAVENOUS ONCE
Status: DISCONTINUED | OUTPATIENT
Start: 2024-02-12 | End: 2024-02-24

## 2024-02-12 RX ORDER — INSULIN ASPART 100 [IU]/ML
0-5 INJECTION, SOLUTION INTRAVENOUS; SUBCUTANEOUS
Status: DISCONTINUED | OUTPATIENT
Start: 2024-02-12 | End: 2024-02-15

## 2024-02-12 RX ORDER — SODIUM CHLORIDE 0.9 % (FLUSH) 0.9 %
10 SYRINGE (ML) INJECTION
Status: DISCONTINUED | OUTPATIENT
Start: 2024-02-12 | End: 2024-03-01 | Stop reason: HOSPADM

## 2024-02-12 RX ADMIN — SODIUM CHLORIDE: 9 INJECTION, SOLUTION INTRAVENOUS at 06:02

## 2024-02-12 RX ADMIN — METOROPROLOL TARTRATE 5 MG: 5 INJECTION, SOLUTION INTRAVENOUS at 06:02

## 2024-02-12 RX ADMIN — METOROPROLOL TARTRATE 5 MG: 5 INJECTION, SOLUTION INTRAVENOUS at 05:02

## 2024-02-12 RX ADMIN — HEPARIN SODIUM 5000 UNITS: 5000 INJECTION INTRAVENOUS; SUBCUTANEOUS at 11:02

## 2024-02-12 RX ADMIN — FUROSEMIDE 80 MG: 10 INJECTION, SOLUTION INTRAMUSCULAR; INTRAVENOUS at 02:02

## 2024-02-12 RX ADMIN — HEPARIN SODIUM 5000 UNITS: 5000 INJECTION INTRAVENOUS; SUBCUTANEOUS at 03:02

## 2024-02-12 RX ADMIN — HEPARIN SODIUM 5000 UNITS: 5000 INJECTION INTRAVENOUS; SUBCUTANEOUS at 05:02

## 2024-02-12 RX ADMIN — SODIUM CHLORIDE: 0.9 INJECTION, SOLUTION INTRAVENOUS at 03:02

## 2024-02-12 RX ADMIN — SEVELAMER CARBONATE 1600 MG: 800 TABLET, FILM COATED ORAL at 07:02

## 2024-02-12 RX ADMIN — POTASSIUM & SODIUM PHOSPHATES POWDER PACK 280-160-250 MG 2 PACKET: 280-160-250 PACK at 10:02

## 2024-02-12 RX ADMIN — POTASSIUM & SODIUM PHOSPHATES POWDER PACK 280-160-250 MG 2 PACKET: 280-160-250 PACK at 09:02

## 2024-02-12 RX ADMIN — PANTOPRAZOLE SODIUM 40 MG: 40 TABLET, DELAYED RELEASE ORAL at 06:02

## 2024-02-12 RX ADMIN — MAGNESIUM SULFATE HEPTAHYDRATE 2 G: 40 INJECTION, SOLUTION INTRAVENOUS at 11:02

## 2024-02-12 RX ADMIN — ACETAMINOPHEN 650 MG: 325 TABLET ORAL at 10:02

## 2024-02-12 RX ADMIN — PANTOPRAZOLE SODIUM 40 MG: 40 TABLET, DELAYED RELEASE ORAL at 04:02

## 2024-02-12 RX ADMIN — HEPARIN SODIUM 1000 UNITS: 1000 INJECTION, SOLUTION INTRAVENOUS; SUBCUTANEOUS at 06:02

## 2024-02-12 RX ADMIN — MUPIROCIN: 20 OINTMENT TOPICAL at 09:02

## 2024-02-12 RX ADMIN — POTASSIUM & SODIUM PHOSPHATES POWDER PACK 280-160-250 MG 2 PACKET: 280-160-250 PACK at 04:02

## 2024-02-12 RX ADMIN — VENLAFAXINE 37.5 MG: 37.5 TABLET ORAL at 09:02

## 2024-02-12 RX ADMIN — MUPIROCIN: 20 OINTMENT TOPICAL at 08:02

## 2024-02-12 RX ADMIN — METOPROLOL TARTRATE 12.5 MG: 25 TABLET, FILM COATED ORAL at 08:02

## 2024-02-12 RX ADMIN — INSULIN ASPART 4 UNITS: 100 INJECTION, SOLUTION INTRAVENOUS; SUBCUTANEOUS at 03:02

## 2024-02-12 RX ADMIN — PIPERACILLIN SODIUM AND TAZOBACTAM SODIUM 4.5 G: 4; .5 INJECTION, POWDER, FOR SOLUTION INTRAVENOUS at 05:02

## 2024-02-12 RX ADMIN — ATORVASTATIN CALCIUM 40 MG: 40 TABLET, FILM COATED ORAL at 06:02

## 2024-02-12 RX ADMIN — SEVELAMER CARBONATE 1600 MG: 800 TABLET, FILM COATED ORAL at 12:02

## 2024-02-12 RX ADMIN — DRONABINOL 5 MG: 2.5 CAPSULE ORAL at 08:02

## 2024-02-12 RX ADMIN — INSULIN ASPART 1 UNITS: 100 INJECTION, SOLUTION INTRAVENOUS; SUBCUTANEOUS at 09:02

## 2024-02-12 RX ADMIN — HUMAN ALBUMIN MICROSPHERES AND PERFLUTREN 0.66 MG: 10; .22 INJECTION, SOLUTION INTRAVENOUS at 09:02

## 2024-02-12 RX ADMIN — IOHEXOL 75 ML: 350 INJECTION, SOLUTION INTRAVENOUS at 06:02

## 2024-02-12 RX ADMIN — METOPROLOL TARTRATE 12.5 MG: 25 TABLET, FILM COATED ORAL at 09:02

## 2024-02-12 RX ADMIN — VANCOMYCIN HYDROCHLORIDE 1000 MG: 1 INJECTION, POWDER, LYOPHILIZED, FOR SOLUTION INTRAVENOUS at 05:02

## 2024-02-12 RX ADMIN — PREGABALIN 75 MG: 75 CAPSULE ORAL at 05:02

## 2024-02-12 RX ADMIN — IPRATROPIUM BROMIDE AND ALBUTEROL SULFATE 3 ML: .5; 3 SOLUTION RESPIRATORY (INHALATION) at 04:02

## 2024-02-12 RX ADMIN — ATORVASTATIN CALCIUM 40 MG: 40 TABLET, FILM COATED ORAL at 09:02

## 2024-02-12 RX ADMIN — Medication 324 MG: at 07:02

## 2024-02-12 RX ADMIN — ONDANSETRON 4 MG: 2 INJECTION INTRAMUSCULAR; INTRAVENOUS at 10:02

## 2024-02-12 NOTE — ASSESSMENT & PLAN NOTE
Patient reports making small volumes of urine. Last HD on Saturday     - Nephro consulted for HD needs

## 2024-02-12 NOTE — HPI
73 y.o. 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 Cardiology was re-consulted in the setting of medication recommendations. In short, this patient was recently seen 2/2 to Afib/flutter, increased trop which peaked at 2, and a newly reduced EF. Patient was evaluated by intervention cardiology for potential obstructive CAD and it was found that she had non obstructive CAD and non ischemic cardiomyopathy. She was started on pressors and CRRT in the setting of acute hypoxemic respiratory failure and was stepped down to the floor.       HPI from previous consult note:   73 y.o. 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  She subsequently developed GIB and discovery of nonbleeding gastric ulcers on EGD made full anticoagulation high risk, so it was stopped. She was ultimately Dc'd to [facility] in [place].  Patient states that at rehab she was having dyspnea on exertion but early this morning she felt acutely short of breath for which he presented to the emergency room.  She was initially placed on BiPAP and renal dialyze her.  Eventually she was placed on 2 L nasal cannula but still very tachypneic on evaluation and very tenuous from respiratory standpoint.  Patient denies having any chest pain, palpitations, orthopnea, presyncope, syncope.  She had an echo that showed a new drop in her EF.  Also her troponins peaked up to 2.  Her initial presenting rhythm was atrial flutter with rapid ventricular rate 2:1 and currently she is in normal sinus rhythm

## 2024-02-12 NOTE — ED TRIAGE NOTES
Lily Green, a 73 y.o. female presents to the ED w/ complaint of shortness of breath. Pt arrives on cpap due to oxygen saturation in the 70% on her 2L that she now currently wears. Recent admission with dx of pneumothorax and placement of a chest tube. Pt required rehabilitation and supplemental oxygen, so she has been living at [facility].  at bedside. RT placing pt on BiPAP.     Triage note:  Chief Complaint   Patient presents with    Shortness of Breath     Arrives on CPAP     Review of patient's allergies indicates:   Allergen Reactions    Crestor [rosuvastatin] Swelling    Gluten protein      Past Medical History:   Diagnosis Date    Anxiety     Celiac disease 2018    Celiac disease     Depression     Diabetes mellitus     Family history of breast cancer in mother      at age 68    Hyperlipidemia     Hypertension     Meniere disease     Meniere's disease, unspecified ear     Menopause     Peptic ulcer     Reflux esophagitis     Urinary tract infection     Vaginal infection     Vaginal Pap smear 2014    Pap/Hpv Negative       Occupational Therapy    Patient not seen in therapy.     Unavailable due to request no therapy today.      Re-attempt plan: per established plan of care    Attempted to encourage patient to participate in therapy but patient continues to request no therapy.      OBJECTIVE                          Therapy procedure time and total treatment time can be found documented on the Time Entry flowsheet

## 2024-02-12 NOTE — EICU
Nurses Note -- 4 Eyes      2/12/2024   3:59 PM      Skin assessed during: Admit      [] No Altered Skin Integrity Present    []Prevention Measures Documented      [x] Yes- Altered Skin Integrity Present or Discovered   [] LDA Added if Not in Epic (Describe Wound)   [x] New Altered Skin Integrity was Present on Admit and Documented in LDA   [x] Wound Image Taken    Wound Care Consulted? Yes    Attending Nurse:  Carmita Verma RN/Staff Member:   Mackenzie RO

## 2024-02-12 NOTE — ASSESSMENT & PLAN NOTE
Presenting rhythm was atrial flutter with 2:1 conduction with spontaneously converted.    She also has history of atrial fibrillation.  Currently not on anticoagulation because of previous GI bleed in the ulcers on EGD.

## 2024-02-12 NOTE — H&P
[facility] - Emergency Dept  Critical Care Medicine  History & Physical    Patient Name: Lily Green  MRN: [id number]  Admission Date: 2/12/2024  Hospital Length of Stay: 0 days  Code Status: Full Code  Attending Physician: Meghan Trujillo MD   Primary Care Provider: Pavel Calixto MD   Principal Problem: Acute hypoxemic respiratory failure    Subjective:     HPI:  Ms. Green is a 73 y.o. 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 She was last admitted from 1/2/24-2/5/24 (35 days) on the thoracic surgery service during which she underwent a right lower lobectomy for resection of her carcinoid tumor and MLND, ultimately requiring a thoracotomy. Post-op, she received a right sided chest tube. 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 Subsequent GIB and discovery of nonbleeding gastric ulcers on EGD made full anticoagulation high risk, so it was stopped. She was ultimately Dc'd to [facility] in [place] where she presents from now.     She presents today from SNF with increased work of breathing, dyspnea, and hypoxia, with oxygen saturation in the 70% on 2L. She became acutely short of breath today and was brought in by EMS after being put on CPAP in the field with some improvement. Placed on BIPAP in the ED. She continues to be tachycardic and tachypnic. She last completed a full session of HD on Saturday. She reports cough and nausea, but denies fever, chills, pain, chest pain, neck pain. She has little residual renal function . Unclear if she has been taking her Torsemide listed as her home med. Her workup was significant for BNP of 2754, Trop 0.312. VBG reveals pH 7.438/38.9/29. EKG reveals sinus tachycardia. CXR has evident pleural effusions and possible multifocal pneumonia. Nephro was consulted  and emergent HD orders were placed with goal of removing 3 L urgently. She will be admitted to MICU for hypoxic respiratory failure and increased work of breathing. CTA PE study is still pending at time of evaluation since patient is getting HD.     Hospital/ICU Course:  No notes on file     Past Medical History:   Diagnosis Date    Anxiety     Celiac disease 2018    Celiac disease     Depression     Diabetes mellitus     Family history of breast cancer in mother      at age 68    Hyperlipidemia     Hypertension     Meniere disease     Meniere's disease, unspecified ear     Menopause     Peptic ulcer     Reflux esophagitis     Urinary tract infection     Vaginal infection     Vaginal Pap smear 2014    Pap/Hpv Negative        Past Surgical History:   Procedure Laterality Date    APPENDECTOMY      BREAST SURGERY      Tags    CARPAL TUNNEL RELEASE Bilateral 2017    ,     CLOSURE, COLOSTOMY Left 2023    Procedure: CLOSURE, COLOSTOMY;  Surgeon: Manuel Javier MD;  Location: [facility] OR;  Service: General;  Laterality: Left;    COLONOSCOPY  2018    Normal  ( Juan A)     COLOSTOMY Right 2023    Procedure: CREATION, COLOSTOMY;  Surgeon: Manuel Jaiver MD;  Location: [facility] OR;  Service: General;  Laterality: Right;    DILATION AND CURETTAGE OF UTERUS      DUPUYTREN CONTRACTURE RELEASE Bilateral 2017    ESOPHAGOGASTRODUODENOSCOPY N/A 2024    Procedure: EGD (ESOPHAGOGASTRODUODENOSCOPY);  Surgeon: Yamilet Walters MD;  [facility]  Service: Gastroenterology;  Laterality: N/A;    ESOPHAGOGASTRODUODENOSCOPY N/A 2024    Procedure: EGD (ESOPHAGOGASTRODUODENOSCOPY);  Surgeon: Yamilet Walters MD;  Location[facility]  Service: Gastroenterology;  Laterality: N/A;    HYSTERECTOMY      BEAU w/ appy, no BSO     IMPLANTATION OF LEADLESS PACEMAKER N/A 2024    Procedure: INSERTION, CARDIAC PACEMAKER, LEADLESS;  Surgeon: LENIN Frances,  MD;  Location: [facility] EP LAB;  Service: Cardiology;  Laterality: N/A;  SB, LEADLESS PPM (MICRA), MDT, ANES, EH, CVICU [id number]    INJECTION OF NEUROLYTIC AGENT AROUND LUMBAR SYMPATHETIC NERVE N/A 1/6/2022    Procedure: BLOCK, LUMBAR SYMPATHETIC;  Surgeon: Souleymane Rizo Jr., MD;  Location: [facility] PAIN MGT;  Service: Pain Management;  Laterality: N/A;  no pacemaker   pt is diabetic     INJECTION OF NEUROLYTIC AGENT AROUND LUMBAR SYMPATHETIC NERVE N/A 8/25/2022    Procedure: BLOCK, LUMBAR SYMPATHETIC;  Surgeon: Souleymane Rizo Jr., MD;  Location: [facility] PAIN MGT;  Service: Pain Management;  Laterality: N/A;  diabetic     INSERTION OF TUNNELED CENTRAL VENOUS HEMODIALYSIS CATHETER Right 1/25/2023    Procedure: INSERTION, CATHETER, HEMODIALYSIS, DUAL LUMEN;  Surgeon: Manuel Javier MD;  Location: [facility] OR;  Service: General;  Laterality: Right;    INSERTION, CATHETER, TUNNELED Left 1/28/2023    Procedure: Insertion,catheter,tunneled;  Surgeon: Watson Fontenot MD;  Location: [facility] OR;  Service: General;  Laterality: Left;    LAPAROTOMY, EXPLORATORY N/A 1/14/2023    Procedure: LAPAROTOMY, EXPLORATORY;  Surgeon: Manuel Javier MD;  Location: [facility] OR;  Service: General;  Laterality: N/A;    LAPAROTOMY, EXPLORATORY N/A 1/16/2023    Procedure: LAPAROTOMY, EXPLORATORY;  Surgeon: Manuel Javier MD;  Location: [facility] OR;  Service: General;  Laterality: N/A;    LYMPHADENECTOMY Right 1/2/2024    Procedure: LYMPHADENECTOMY;  Surgeon: Tan Thomson MD;  Location: [facility] OR 2ND FLR;  Service: Cardiothoracic;  Laterality: Right;    PACEMAKER, TEMPORARY, TRANSVENOUS, PEDIATRIC Right 1/12/2024    Procedure: Pacemaker, Temporary, Transvenous;  Surgeon: Todd Carlisle MD;  Location: [facility] CATH LAB;  Service: Cardiology;  Laterality: Right;    REMOVAL OF CATHETER Right 1/28/2023    Procedure: REMOVAL, CATHETER;  Surgeon: Watson Fontenot MD;  Location: [facility] OR;  Service: General;  Laterality: Right;    REMOVAL, TUNNELED CATH  Right 1/25/2023    Procedure: REMOVAL, TUNNELED CATH;  Surgeon: Manuel Javier MD;  Location: [facility];  Service: General;  Laterality: Right;    ROBOTIC BRONCHOSCOPY N/A 10/20/2023    Procedure: ROBOTIC BRONCHOSCOPY;  Surgeon: Mayda Monzon MD;  Location: [address];  Service: Pulmonary;  Laterality: N/A;    SHOULDER SURGERY  2009 & 2010    right rotator cuff    THORACOTOMY Right 1/2/2024    Procedure: THORACOTOMY;  Surgeon: Tan Thomson MD;  [facility];  Service: Cardiothoracic;  Laterality: Right;    THORACOTOMY, WITH INITIAL THERAPEUTIC WEDGE RESECTION OF LUNG Right 1/2/2024    Procedure: THORACOTOMY, WITH INITIAL THERAPEUTIC WEDGE RESECTION OF LUNG;  Surgeon: Tan Thomson MD;  Location: [facility] OR [address] [place] [address];  Service: Cardiothoracic;  Laterality: Right;    TONSILLECTOMY      UPPER GASTROINTESTINAL ENDOSCOPY  04/2018       Review of patient's allergies indicates:   Allergen Reactions    Crestor [rosuvastatin] Swelling    Gluten protein        Family History       Problem Relation (Age of Onset)    Arthritis Father    Breast cancer Mother, Paternal Aunt    Cancer Mother, Paternal Aunt    Diabetes Paternal Grandfather    Hyperlipidemia Sister    Vision loss Father, Mother, Sister          Tobacco Use    Smoking status: Never    Smokeless tobacco: Never   Substance and Sexual Activity    Alcohol use: No    Drug use: Never    Sexual activity: Not Currently     Partners: Male     Birth control/protection: See Surgical Hx     Comment: :       Review of Systems   Constitutional:  Positive for fatigue. Negative for activity change, appetite change and fever.   Respiratory:  Positive for cough, chest tightness and shortness of breath. Negative for wheezing.    Cardiovascular:  Negative for chest pain and leg swelling.   Gastrointestinal:  Negative for abdominal pain, constipation, diarrhea, nausea and vomiting.   Genitourinary: Negative.    Musculoskeletal: Negative.    Skin:   Negative for rash.   Neurological:  Negative for headaches.   Psychiatric/Behavioral: Negative.       Objective:     Vital Signs (Most Recent):  Temp: 99.1 °F (37.3 °C) (24 0325)  Pulse: 110 (24 0430)  Resp: (!) 31 (24)  BP: 118/60 (24 0430)  SpO2: 95 % (24) Vital Signs (24h Range):  Temp:  [97.6 °F (36.4 °C)-99.1 °F (37.3 °C)] 99.1 °F (37.3 °C)  Pulse:  [110-132] 110  Resp:  [31-56] 31  SpO2:  [94 %-99 %] 95 %  BP: (118-177)/(60-96) 118/60      There is no height or weight on file to calculate BMI.    No intake or output data in the 24 hours ending 24       Physical Exam  Vitals and nursing note reviewed.   HENT:      Head: Normocephalic and atraumatic.   Eyes:      Conjunctiva/sclera: Conjunctivae normal.      Pupils: Pupils are equal, round, and reactive to light.   Cardiovascular:      Rate and Rhythm: Normal rate and regular rhythm.      Pulses: Normal pulses.   Pulmonary:      Effort: Tachypnea and respiratory distress present.      Breath sounds: Decreased air movement present. No wheezing or rales.   Abdominal:      Palpations: Abdomen is soft.      Tenderness: There is no abdominal tenderness. There is no guarding.   Musculoskeletal:      Right lower le+ Edema present.      Left lower le+ Edema present.   Skin:     General: Skin is warm and dry.   Neurological:      General: No focal deficit present.      Mental Status: She is alert and oriented to person, place, and time.   Psychiatric:         Mood and Affect: Mood normal.            Vents:  Oxygen Concentration (%): 40 (24 0325)  Lines/Drains/Airways       Central Venous Catheter Line  Duration                  Hemodialysis Catheter 23 1501 right internal jugular 382 days              Peripheral Intravenous Line  Duration                  Hemodialysis AV Fistula 23 0800 Left forearm 163 days         Peripheral IV - Single Lumen 24 0207 18 G Anterior;Proximal;Right Forearm  <1 day         Peripheral IV - Single Lumen 02/12/24 0207 18 G Right;Medial Upper Arm <1 day                  Significant Labs:    CBC/Anemia Profile:  Recent Labs   Lab 02/12/24  0115   WBC 11.47   HGB 10.5*   HCT 33.0*      MCV 94   RDW 23.0*        Chemistries:  Recent Labs   Lab 02/12/24  0115   *   K 3.8   CL 93*   CO2 24   BUN 34*   CREATININE 4.0*   CALCIUM 9.5   ALBUMIN 2.5*   PROT 7.2   BILITOT 0.4   ALKPHOS 101   ALT 7*   AST 17       All pertinent labs within the past 24 hours have been reviewed.    Significant Imaging: I have reviewed all pertinent imaging results/findings within the past 24 hours.  Assessment/Plan:     Psychiatric  Generalized anxiety disorder  - Continue home venlafaxine    Pulmonary  * Acute hypoxemic respiratory failure  Patient with Hypoxic Respiratory failure which is Acute on chronic.  she is on home oxygen at 2 LPM. Supplemental oxygen was provided and noted- Oxygen Concentration (%):  [40] 40    .   Signs/symptoms of respiratory failure include- tachypnea, increased work of breathing, respiratory distress, and use of accessory muscles. Contributing diagnoses includes - Pleural effusion Labs and images were reviewed. Patient Has recent ABG, which has been reviewed. Will treat underlying causes and adjust management of respiratory failure as follows-     Patient without CHF on most recent Echo (01/12/24) which demonstrated EF 60-65%, no significant valvulopathy, borderline pulm HTN. On torsemide at baseline. Patient reports having some high sodium foods lately. High suspicion for hypervolemia given BNP elevation, though this may be confounded by CKD.     - Appreciated emergent HD by Nephro for volume management  - Admitted on BiPAP, wean as tolerated after HD      Parapneumonic effusion  On Cefpodoxime and Flagyl until planned end date of 02/15/24 per ID recs. Given recent hospitalization and re-admission will start broad spectrum Abx.     - f/u Bcx, de-escalate  pending cultures    Cardiac/Vascular  Atrial fibrillation  Continue home med lopressor. No therapeutic anticoagulation for now given GIB during previous hospitalization    Hyperlipidemia, mixed  - Continue home statin    Renal/  Chronic kidney disease-mineral and bone disorder  - Continue home Sevelamer    ESRD (end stage renal disease)  Patient reports making small volumes of urine. Last HD on Saturday     - Nephro consulted for HD needs    Oncology  Anemia in ESRD (end-stage renal disease)  Has anemia of ESRD. Goal Hemoglobin >10    Endocrine  Type 2 diabetes mellitus with diabetic polyneuropathy, without long-term current use of insulin  Last A1C 4.9    - LD SSI, add scheduled detemir and aspart if needed    GI  GIB (gastrointestinal bleeding)  - Recent GIB on last admission with GI recommending 8 week course of Protonix s/p EGD with nonbleeding gastric ulcer. PPI end date 02/23/24 and follow up EGD planned at that time. Continue PPI while inpatient.         Critical Care Daily Checklist:    A: Awake: RASS Goal/Actual Goal:    Actual:     B: Spontaneous Breathing Trial Performed?     C: SAT & SBT Coordinated?  NA                      D: Delirium: CAM-ICU     E: Early Mobility Performed? No   F: Feeding Goal:    Status:     Current Diet Order   Procedures    Diet Soft & Bite Sized (IDDSI Level 6) Thin     Order Specific Question:   Fluid consistency:     Answer:   Thin      AS: Analgesia/Sedation NA   T: Thromboembolic Prophylaxis heparin   H: HOB > 300 Yes   U: Stress Ulcer Prophylaxis (if needed) pantoprazole   G: Glucose Control hyperglycemic   B: Bowel Function     I: Indwelling Catheter (Lines & June) Necessity Central line, fistula, PIV   D: De-escalation of Antimicrobials/Pharmacotherapies Zosyn, vanc    Plan for the day/ETD Admit to MICU, CTA, HD    Code Status:  Family/Goals of Care: Full Code         Critical secondary to Patient has a condition that poses threat to life and bodily function: Severe  Respiratory Distress    Critical care was time spent personally by me on the following activities: development of treatment plan with patient or surrogate and bedside caregivers, discussions with consultants, evaluation of patient's response to treatment, examination of patient, ordering and performing treatments and interventions, ordering and review of laboratory studies, ordering and review of radiographic studies, pulse oximetry, re-evaluation of patient's condition. This critical care time did not overlap with that of any other provider or involve time for any procedures.     David Olivo MD  Critical Care Medicine  [facility] - Emergency Dept

## 2024-02-12 NOTE — PROGRESS NOTES
"Pharmacokinetic Initial Assessment: IV Vancomycin    Assessment/Plan:    Initiate intravenous vancomycin with loading dose of 1000 mg once with subsequent doses when random concentrations are less than 20 mcg/mL  Desired empiric serum trough concentration is 15 to 20 mcg/mL  Draw vancomycin random level on 2/14 at 0400.  Pharmacy will continue to follow and monitor vancomycin.      Please contact pharmacy at extension [phone] with any questions regarding this assessment.     Thank you for the consult,   Jasmyne Destiny       Patient brief summary:  Lily Green is a 73 y.o. female initiated on antimicrobial therapy with IV Vancomycin for treatment of suspected sepsis    Drug Allergies:   Review of patient's allergies indicates:   Allergen Reactions    Crestor [rosuvastatin] Swelling    Gluten protein        Actual Body Weight:   67.6 kg    Renal Function:   CrCl cannot be calculated (Unknown ideal weight.).    Dialysis Method (if applicable):  intermittent HD    CBC (last 72 hours):  Recent Labs   Lab Result Units 02/12/24  0115   WBC K/uL 11.47   Hemoglobin g/dL 10.5*   Hematocrit % 33.0*   Platelets K/uL 334   Gran % % 92.0*   Lymph % % 3.0*   Mono % % 4.0   Eosinophil % % 0.0   Basophil % % 0.0   Differential Method  Automated       Metabolic Panel (last 72 hours):  Recent Labs   Lab Result Units 02/12/24  0115   Sodium mmol/L 133*   Potassium mmol/L 3.8   Chloride mmol/L 93*   CO2 mmol/L 24   Glucose mg/dL 266*   BUN mg/dL 34*   Creatinine mg/dL 4.0*   Albumin g/dL 2.5*   Total Bilirubin mg/dL 0.4   Alkaline Phosphatase U/L 101   AST U/L 17   ALT U/L 7*       Drug levels (last 3 results):  No results for input(s): "VANCOMYCINRA", "VANCORANDOM", "VANCOMYCINPE", "VANCOPEAK", "VANCOMYCINTR", "VANCOTROUGH" in the last 72 hours.    Microbiologic Results:  Microbiology Results (last 7 days)       Procedure Component Value Units Date/Time    Blood culture [[id number]]     Order Status: No result Specimen: " Blood     Blood culture [[id number]]     Order Status: No result Specimen: Blood

## 2024-02-12 NOTE — PLAN OF CARE
Nephrology Plan of Care    No intake or output data in the 24 hours ending 02/12/24 0229    Vitals:    02/12/24 0145 02/12/24 0158 02/12/24 0200 02/12/24 0215   BP: (!) 165/93  (!) 168/94 (!) 158/89   Pulse: (!) 125 (!) 123 (!) 121 (!) 119   Resp: (!) 52 (!) 54 (!) 46 (!) 54   Temp:       TempSrc:       SpO2: 97% 97% 97% 98%       Recent Labs   Lab 02/05/24  0319 02/06/24  0335 02/12/24  0115   * 132* 133*   K 3.7 3.9 3.8    102 93*   CO2 24 21* 24   BUN 36* 26* 34*   CREATININE 4.3* 3.2* 4.0*   CALCIUM 8.8 8.5* 9.5   PHOS 2.1* 2.2*  --      A1c - 11/15/2023 4.9     Notified by ED of Ms Green's presentation. Patient's chart and laboratory studies reviewed. Will provide urgent iHD for metabolic clearance and volume management. Full H&P note to follow later today. Please call nephrology as needed. We will continue to follow.    Ernesto Ayala MD  Nephrology

## 2024-02-12 NOTE — HPI
Ms. Green is a 73 y.o. 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 She was last admitted from 1/2/24-2/5/24 (35 days) on the thoracic surgery service during which she underwent a right lower lobectomy for resection of her carcinoid tumor and MLND, ultimately requiring a thoracotomy. Post-op, she received a right sided chest tube. 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 Subsequent GIB and discovery of nonbleeding gastric ulcers on EGD made full anticoagulation high risk, so it was stopped. She was ultimately Dc'd to [facility] in [place] where she presents from now.     She presents today from SNF with increased work of breathing, dyspnea, and hypoxia, with oxygen saturation in the 70% on 2L. She became acutely short of breath today and was brought in by EMS after being put on CPAP in the field with some improvement. Placed on BIPAP in the ED. She continues to be tachycardic and tachypnic. She last completed a full session of HD on Saturday. She reports cough and nausea, but denies fever, chills, pain, chest pain, neck pain. She has little residual renal function . Unclear if she has been taking her Torsemide listed as her home med. Her workup was significant for BNP of 2754, Trop 0.312. VBG reveals pH 7.438/38.9/29. EKG reveals sinus tachycardia. CXR has evident pleural effusions and possible multifocal pneumonia. Nephro was consulted and emergent HD orders were placed with goal of removing 3 L urgently. She will be admitted to MICU for hypoxic respiratory failure and increased work of breathing. CTA PE study is still pending at time of evaluation since patient is getting HD.

## 2024-02-12 NOTE — ASSESSMENT & PLAN NOTE
Patient with Hypoxic Respiratory failure which is Acute on chronic.  she is on home oxygen at 2 LPM. Supplemental oxygen was provided and noted- Oxygen Concentration (%):  [40] 40    .   Signs/symptoms of respiratory failure include- tachypnea, increased work of breathing, respiratory distress, and use of accessory muscles. Contributing diagnoses includes - Pleural effusion Labs and images were reviewed. Patient Has recent ABG, which has been reviewed. Will treat underlying causes and adjust management of respiratory failure as follows-     Patient without CHF on most recent Echo (01/12/24) which demonstrated EF 60-65%, no significant valvulopathy, borderline pulm HTN. On torsemide at baseline. Patient reports having some high sodium foods lately. High suspicion for hypervolemia given BNP elevation, though this may be confounded by CKD.     - Appreciated emergent HD by Nephro for volume management  - Admitted on BiPAP, wean as tolerated after HD

## 2024-02-12 NOTE — SUBJECTIVE & OBJECTIVE
Past Medical History:   Diagnosis Date    Anxiety     Celiac disease 2018    Celiac disease     Depression     Diabetes mellitus     Family history of breast cancer in mother      at age 68    Hyperlipidemia     Hypertension     Meniere disease     Meniere's disease, unspecified ear     Menopause     Peptic ulcer     Reflux esophagitis     Urinary tract infection     Vaginal infection     Vaginal Pap smear 2014    Pap/Hpv Negative        Past Surgical History:   Procedure Laterality Date    APPENDECTOMY      BREAST SURGERY      Tags    CARPAL TUNNEL RELEASE Bilateral 2017    ,     CLOSURE, COLOSTOMY Left 2023    Procedure: CLOSURE, COLOSTOMY;  Surgeon: Manuel Javier MD;  Location: [facility] OR;  Service: General;  Laterality: Left;    COLONOSCOPY  2018    Normal  ( Juan A)     COLOSTOMY Right 2023    Procedure: CREATION, COLOSTOMY;  Surgeon: Manuel Javier MD;  Location: [facility] OR;  Service: General;  Laterality: Right;    DILATION AND CURETTAGE OF UTERUS  1986    DUPUYTREN CONTRACTURE RELEASE Bilateral 2017    ESOPHAGOGASTRODUODENOSCOPY N/A 2024    Procedure: EGD (ESOPHAGOGASTRODUODENOSCOPY);  Surgeon: Yamilet Walters MD;  Location: [facility] ([address] [place] [address]);  Service: Gastroenterology;  Laterality: N/A;    ESOPHAGOGASTRODUODENOSCOPY N/A 2024    Procedure: EGD (ESOPHAGOGASTRODUODENOSCOPY);  Surgeon: Yamilet Walters MD;  Location: [address]);  Service: Gastroenterology;  Laterality: N/A;    HYSTERECTOMY      BEAU w/ appy, no BSO     IMPLANTATION OF LEADLESS PACEMAKER N/A 2024    Procedure: INSERTION, CARDIAC PACEMAKER, LEADLESS;  Surgeon: LENIN Frances MD;  Location: [facility] EP LAB;  Service: Cardiology;  Laterality: N/A;  SB, LEADLESS PPM (MICRA)SAM, ANES, EH, CVICU [id number]    INJECTION OF NEUROLYTIC AGENT AROUND LUMBAR SYMPATHETIC NERVE N/A 2022    Procedure: BLOCK, LUMBAR SYMPATHETIC;  Surgeon: Souleymane Rizo Jr., MD;  Location: [facility]  PAIN MGT;  Service: Pain Management;  Laterality: N/A;  no pacemaker   pt is diabetic     INJECTION OF NEUROLYTIC AGENT AROUND LUMBAR SYMPATHETIC NERVE N/A 8/25/2022    Procedure: BLOCK, LUMBAR SYMPATHETIC;  Surgeon: Souleymane Rizo Jr., MD;  Location: [facility] PAIN MGT;  Service: Pain Management;  Laterality: N/A;  diabetic     INSERTION OF TUNNELED CENTRAL VENOUS HEMODIALYSIS CATHETER Right 1/25/2023    Procedure: INSERTION, CATHETER, HEMODIALYSIS, DUAL LUMEN;  Surgeon: Manuel Javier MD;  Location: [facility] OR;  Service: General;  Laterality: Right;    INSERTION, CATHETER, TUNNELED Left 1/28/2023    Procedure: Insertion,catheter,tunneled;  Surgeon: Watson Fontenot MD;  Location: [facility] OR;  Service: General;  Laterality: Left;    LAPAROTOMY, EXPLORATORY N/A 1/14/2023    Procedure: LAPAROTOMY, EXPLORATORY;  Surgeon: Manuel Javier MD;  Location: [facility] OR;  Service: General;  Laterality: N/A;    LAPAROTOMY, EXPLORATORY N/A 1/16/2023    Procedure: LAPAROTOMY, EXPLORATORY;  Surgeon: Manuel Javier MD;  Location: [facility] OR;  Service: General;  Laterality: N/A;    LYMPHADENECTOMY Right 1/2/2024    Procedure: LYMPHADENECTOMY;  Surgeon: Tan Thomson MD;  Location: [address];  Service: Cardiothoracic;  Laterality: Right;    PACEMAKER, TEMPORARY, TRANSVENOUS, PEDIATRIC Right 1/12/2024    Procedure: Pacemaker, Temporary, Transvenous;  Surgeon: Todd Carlisle MD;  Location: [facility] CATH LAB;  Service: Cardiology;  Laterality: Right;    REMOVAL OF CATHETER Right 1/28/2023    Procedure: REMOVAL, CATHETER;  Surgeon: Watson Fontenot MD;  Location: [facility] OR;  Service: General;  Laterality: Right;    REMOVAL, TUNNELED CATH Right 1/25/2023    Procedure: REMOVAL, TUNNELED CATH;  Surgeon: Manuel Javier MD;  Location: [facility] OR;  Service: General;  Laterality: Right;    ROBOTIC BRONCHOSCOPY N/A 10/20/2023    Procedure: ROBOTIC BRONCHOSCOPY;  Surgeon: Mayda Monzon MD;  [facility];  Service:  Pulmonary;  Laterality: N/A;    SHOULDER SURGERY  2009 & 2010    right rotator cuff    THORACOTOMY Right 1/2/2024    Procedure: THORACOTOMY;  Surgeon: Tan Thomson MD;  Location: [address];  Service: Cardiothoracic;  Laterality: Right;    THORACOTOMY, WITH INITIAL THERAPEUTIC WEDGE RESECTION OF LUNG Right 1/2/2024    Procedure: THORACOTOMY, WITH INITIAL THERAPEUTIC WEDGE RESECTION OF LUNG;  Surgeon: Tan Thomson MD;  Location: [facility] OR [address] [place] [address];  Service: Cardiothoracic;  Laterality: Right;    TONSILLECTOMY      UPPER GASTROINTESTINAL ENDOSCOPY  04/2018       Review of patient's allergies indicates:   Allergen Reactions    Crestor [rosuvastatin] Swelling    Gluten protein        No current facility-administered medications on file prior to encounter.     Current Outpatient Medications on File Prior to Encounter   Medication Sig    atorvastatin (LIPITOR) 40 MG tablet Take 1 tablet (40 mg total) by mouth once daily. (Patient taking differently: Take 40 mg by mouth every evening.)    B complex-vitamin C-folic acid (MELLISSA-BENJY) 0.8 mg Tab Take 1 tablet (0.8 mg total) by mouth once daily.    betahistine HCl (BETAHISTINE, BULK, MISC)     blood sugar diagnostic (TRUE METRIX GLUCOSE TEST STRIP) Strp USE ONE STRIP FOR TESTING 2 TIMES A DAY    blood-glucose meter kit Use to test twice a day    calcium-vitamin D3 (OS-CIPRIANO 500 + D3) 500 mg-5 mcg (200 unit) per tablet Take 1 tablet by mouth once daily.    cefpodoxime (VANTIN) 200 MG tablet Take 1 tablet (200 mg total) by mouth every evening. for 13 days    dronabinoL (MARINOL) 5 MG capsule Take 1 capsule (5 mg total) by mouth 2 (two) times daily before meals.    epoetin noble-epbx (RETACRIT) 10,000 unit/mL imjection Inject 1 mL (10,000 Units total) into the skin every Mon, Wed, Fri. HOLD IF HEMOGLOBIN is 10 or GREATER    DO NOT SHAKE  DO NOT DILUTE  DO NOT MIX with other drug solutions    ferrous gluconate (FERGON) 324 MG tablet Take 1 tablet (324 mg total) by mouth  "daily with breakfast.    insulin aspart U-100 (NOVOLOG) 100 unit/mL (3 mL) InPn pen Inject before meals:  150-200=+1, 201-250=+2, 251-300=+3; 301-350=+4, over 350=+5 units    lancets Misc 1 lancet by Misc.(Non-Drug; Combo Route) route 4 (four) times daily.    meclizine (ANTIVERT) 25 mg tablet Take 1 tablet (25 mg total) by mouth 3 (three) times daily as needed for Dizziness.    melatonin (MELATIN) 3 mg tablet Take 3 tablets (9 mg total) by mouth nightly as needed for Insomnia.    metoprolol tartrate (LOPRESSOR) 25 MG tablet Take 0.5 tablets (12.5 mg total) by mouth 2 (two) times daily.    metroNIDAZOLE (FLAGYL) 500 MG tablet Take 1 tablet (500 mg total) by mouth every 12 (twelve) hours. for 13 days    midodrine (PROAMATINE) 10 MG tablet Take 1 tablet (10 mg total) by mouth 2 (two) times daily with meals.    nutritional supplements Liqd Take 237 mLs by mouth 4 (four) times daily.    pantoprazole (PROTONIX) 40 MG tablet Take 1 tablet (40 mg total) by mouth 2 (two) times daily before meals.    patiromer calcium sorbitex (VELTASSA) 8.4 gram PwPk Take 2 packets (16.8 g total) by mouth once daily.    pen needle, diabetic (BD ULTRA-FINE MARIS PEN NEEDLE) 32 gauge x 5/32" Ndle 1 Device by Misc.(Non-Drug; Combo Route) route 4 (four) times daily with meals and nightly.    pregabalin (LYRICA) 75 MG capsule Take 1 capsule (75 mg total) by mouth Daily. Give after HD    sevelamer HCL (RENAGEL) 800 MG Tab Take 2 tablets (1,600 mg total) by mouth 3 (three) times daily with meals.    torsemide 40 mg Tab Take 40 mg by mouth once daily.    venlafaxine (EFFEXOR) 37.5 MG Tab Take 1 tablet (37.5 mg total) by mouth once daily.    vit C,I-Ti-ajcle-lutein-zeaxan (PRESERVISION AREDS 2) 680-353-57-1 mg-unit-mg-mg Cap     vitamins  A,C,E-zinc-copper 14,320-226-200 unit-mg-unit Cap Take 1 tablet by mouth once daily.     Family History       Problem Relation (Age of Onset)    Arthritis Father    Breast cancer Mother, Paternal Aunt    Cancer " Mother, Paternal Aunt    Diabetes Paternal Grandfather    Hyperlipidemia Sister    Vision loss Father, Mother, Sister          Tobacco Use    Smoking status: Never    Smokeless tobacco: Never   Substance and Sexual Activity    Alcohol use: No    Drug use: Never    Sexual activity: Not Currently     Partners: Male     Birth control/protection: See Surgical Hx     Comment: :      Review of Systems   Constitutional: Negative.   HENT: Negative.     Eyes: Negative.    Cardiovascular:  Positive for dyspnea on exertion.   Respiratory: Negative.     Endocrine: Negative.    Hematologic/Lymphatic: Negative.    Musculoskeletal: Negative.    Gastrointestinal: Negative.    Genitourinary: Negative.    Neurological: Negative.      Objective:     Vital Signs (Most Recent):  Temp: 98.2 °F (36.8 °C) (02/12/24 1600)  Pulse: (!) 116 (02/12/24 1700)  Resp: (!) 50 (02/12/24 1700)  BP: 118/76 (02/12/24 1700)  SpO2: (!) 94 % (02/12/24 1700) Vital Signs (24h Range):  Temp:  [97.6 °F (36.4 °C)-99.1 °F (37.3 °C)] 98.2 °F (36.8 °C)  Pulse:  [] 116  Resp:  [16-56] 50  SpO2:  [88 %-100 %] 94 %  BP: ()/(54-98) 118/76        Body mass index is 24.79 kg/m².    SpO2: (!) 94 %         Intake/Output Summary (Last 24 hours) at 2/12/2024 1725  Last data filed at 2/12/2024 1700  Gross per 24 hour   Intake 1042.85 ml   Output 2200 ml   Net -1157.15 ml       Lines/Drains/Airways       Central Venous Catheter Line  Duration             Permacath right subclavian -- days         Hemodialysis Catheter 01/25/23 1501 right internal jugular 383 days              Peripheral Intravenous Line  Duration                  Hemodialysis AV Fistula 09/01/23 0800 Left forearm 164 days         Peripheral IV - Single Lumen 02/12/24 0207 18 G Anterior;Proximal;Right Forearm <1 day                     Physical Exam  Constitutional:       Appearance: She is ill-appearing and toxic-appearing.   HENT:      Head: Normocephalic.   Cardiovascular:      Rate and  Rhythm: Regular rhythm. Tachycardia present.      Pulses: Normal pulses.      Comments: Gallop present  Pulmonary:      Effort: Pulmonary effort is normal.      Breath sounds: Rhonchi present.      Comments: Mild crackles present at the bases  Abdominal:      General: Abdomen is flat. Bowel sounds are normal.      Palpations: Abdomen is soft.   Musculoskeletal:         General: Normal range of motion.      Cervical back: Normal range of motion.   Skin:     General: Skin is warm.      Capillary Refill: Capillary refill takes less than 2 seconds.   Neurological:      General: No focal deficit present.      Mental Status: She is alert and oriented to person, place, and time.          Significant Labs: All pertinent lab results from the last 24 hours have been reviewed.    Significant Imaging: EKG:  On presentation her EKG showed atrial flutter with 2:1 conduction

## 2024-02-12 NOTE — SUBJECTIVE & OBJECTIVE
Past Medical History:   Diagnosis Date    Anxiety     Celiac disease 2018    Celiac disease     Depression     Diabetes mellitus     Family history of breast cancer in mother      at age 68    Hyperlipidemia     Hypertension     Meniere disease     Meniere's disease, unspecified ear     Menopause     Peptic ulcer     Reflux esophagitis     Urinary tract infection     Vaginal infection     Vaginal Pap smear 2014    Pap/Hpv Negative        Past Surgical History:   Procedure Laterality Date    APPENDECTOMY      BREAST SURGERY      Tags    CARPAL TUNNEL RELEASE Bilateral 2017    ,     CLOSURE, COLOSTOMY Left 2023    Procedure: CLOSURE, COLOSTOMY;  Surgeon: Manuel Javier MD;  Location: [facility] OR;  Service: General;  Laterality: Left;    COLONOSCOPY  2018    Normal  ( Juan A)     COLOSTOMY Right 2023    Procedure: CREATION, COLOSTOMY;  Surgeon: Manuel Javier MD;  Location: [facility] OR;  Service: General;  Laterality: Right;    DILATION AND CURETTAGE OF UTERUS  1986    DUPUYTREN CONTRACTURE RELEASE Bilateral 2017    ESOPHAGOGASTRODUODENOSCOPY N/A 2024    Procedure: EGD (ESOPHAGOGASTRODUODENOSCOPY);  Surgeon: Yamilet Walters MD;  Location: [facility] ([address] [place] [address]);  Service: Gastroenterology;  Laterality: N/A;    ESOPHAGOGASTRODUODENOSCOPY N/A 2024    Procedure: EGD (ESOPHAGOGASTRODUODENOSCOPY);  Surgeon: Yamilet Wlaters MD;  Location: [address]);  Service: Gastroenterology;  Laterality: N/A;    HYSTERECTOMY      BEAU w/ appy, no BSO     IMPLANTATION OF LEADLESS PACEMAKER N/A 2024    Procedure: INSERTION, CARDIAC PACEMAKER, LEADLESS;  Surgeon: LENIN Frances MD;  Location: [facility] EP LAB;  Service: Cardiology;  Laterality: N/A;  SB, LEADLESS PPM (MICRA)SAM, ANES, EH, CVICU [id number]    INJECTION OF NEUROLYTIC AGENT AROUND LUMBAR SYMPATHETIC NERVE N/A 2022    Procedure: BLOCK, LUMBAR SYMPATHETIC;  Surgeon: Souleymane Rizo Jr., MD;  Location: [facility]  PAIN MGT;  Service: Pain Management;  Laterality: N/A;  no pacemaker   pt is diabetic     INJECTION OF NEUROLYTIC AGENT AROUND LUMBAR SYMPATHETIC NERVE N/A 8/25/2022    Procedure: BLOCK, LUMBAR SYMPATHETIC;  Surgeon: Souleymane Rizo Jr., MD;  Location: [facility] PAIN MGT;  Service: Pain Management;  Laterality: N/A;  diabetic     INSERTION OF TUNNELED CENTRAL VENOUS HEMODIALYSIS CATHETER Right 1/25/2023    Procedure: INSERTION, CATHETER, HEMODIALYSIS, DUAL LUMEN;  Surgeon: Manuel Javier MD;  Location: [facility] OR;  Service: General;  Laterality: Right;    INSERTION, CATHETER, TUNNELED Left 1/28/2023    Procedure: Insertion,catheter,tunneled;  Surgeon: Watson Fontenot MD;  Location: [facility] OR;  Service: General;  Laterality: Left;    LAPAROTOMY, EXPLORATORY N/A 1/14/2023    Procedure: LAPAROTOMY, EXPLORATORY;  Surgeon: Manuel Javier MD;  Location: [facility] OR;  Service: General;  Laterality: N/A;    LAPAROTOMY, EXPLORATORY N/A 1/16/2023    Procedure: LAPAROTOMY, EXPLORATORY;  Surgeon: Manuel Javier MD;  Location: [facility] OR;  Service: General;  Laterality: N/A;    LYMPHADENECTOMY Right 1/2/2024    Procedure: LYMPHADENECTOMY;  Surgeon: Tan Thomson MD;  Location: [address];  Service: Cardiothoracic;  Laterality: Right;    PACEMAKER, TEMPORARY, TRANSVENOUS, PEDIATRIC Right 1/12/2024    Procedure: Pacemaker, Temporary, Transvenous;  Surgeon: Todd Carlisle MD;  Location: [facility] CATH LAB;  Service: Cardiology;  Laterality: Right;    REMOVAL OF CATHETER Right 1/28/2023    Procedure: REMOVAL, CATHETER;  Surgeon: Watson Fontenot MD;  Location: [facility] OR;  Service: General;  Laterality: Right;    REMOVAL, TUNNELED CATH Right 1/25/2023    Procedure: REMOVAL, TUNNELED CATH;  Surgeon: Manuel Javier MD;  Location: [facility] OR;  Service: General;  Laterality: Right;    ROBOTIC BRONCHOSCOPY N/A 10/20/2023    Procedure: ROBOTIC BRONCHOSCOPY;  Surgeon: Mayda Monzon MD;  [facility];  Service:  Pulmonary;  Laterality: N/A;    SHOULDER SURGERY  2009 & 2010    right rotator cuff    THORACOTOMY Right 1/2/2024    Procedure: THORACOTOMY;  Surgeon: Tan Thomson MD;  Location: [facility] OR [address] [place] [address];  Service: Cardiothoracic;  Laterality: Right;    THORACOTOMY, WITH INITIAL THERAPEUTIC WEDGE RESECTION OF LUNG Right 1/2/2024    Procedure: THORACOTOMY, WITH INITIAL THERAPEUTIC WEDGE RESECTION OF LUNG;  Surgeon: Tan Thomson MD;  Location: [facility] OR [address] [place] [address];  Service: Cardiothoracic;  Laterality: Right;    TONSILLECTOMY      UPPER GASTROINTESTINAL ENDOSCOPY  04/2018       Review of patient's allergies indicates:   Allergen Reactions    Crestor [rosuvastatin] Swelling    Gluten protein      Current Facility-Administered Medications   Medication Frequency    0.9%  NaCl infusion Once    acetaminophen tablet 650 mg Q4H PRN    atorvastatin tablet 40 mg QHS    capsaicin-skin cleanser patch 1 patch 1 time in Clinic/HOD    capsaicin-skin cleanser patch 2 patch 1 time in Clinic/HOD    dextrose 10% bolus 125 mL 125 mL PRN    dextrose 10% bolus 250 mL 250 mL PRN    droNABinol capsule 5 mg BID AC    ferrous gluconate tablet 324 mg Daily with breakfast    glucagon (human recombinant) injection 1 mg PRN    glucose chewable tablet 16 g PRN    glucose chewable tablet 24 g PRN    heparin (porcine) injection 1,000 Units PRN    heparin (porcine) injection 5,000 Units Q8H    insulin aspart U-100 pen 0-5 Units QID (AC + HS) PRN    melatonin tablet 6 mg Nightly PRN    metoprolol tartrate (LOPRESSOR) split tablet 12.5 mg BID    mupirocin 2 % ointment BID    ondansetron injection 4 mg Q6H PRN    pantoprazole EC tablet 40 mg BID AC    piperacillin-tazobactam (ZOSYN) 4.5 g in dextrose 5 % in water (D5W) 100 mL IVPB (MB+) Q12H    potassium, sodium phosphates 280-160-250 mg packet 2 packet QID (AC & HS)    pregabalin capsule 75 mg Daily    sevelamer carbonate tablet 1,600 mg TID WM    sodium chloride 0.9% bolus 250 mL 250 mL PRN    sodium  "chloride 0.9% flush 10 mL PRN    vancomycin - pharmacy to dose pharmacy to manage frequency    venlafaxine tablet 37.5 mg Daily     Current Outpatient Medications   Medication    atorvastatin (LIPITOR) 40 MG tablet    B complex-vitamin C-folic acid (MELLISSA-BENJY) 0.8 mg Tab    betahistine HCl (BETAHISTINE, BULK, MISC)    blood sugar diagnostic (TRUE METRIX GLUCOSE TEST STRIP) Strp    blood-glucose meter kit    calcium-vitamin D3 (OS-CIPRIANO 500 + D3) 500 mg-5 mcg (200 unit) per tablet    cefpodoxime (VANTIN) 200 MG tablet    dronabinoL (MARINOL) 5 MG capsule    epoetin noble-epbx (RETACRIT) 10,000 unit/mL imjection    ferrous gluconate (FERGON) 324 MG tablet    insulin aspart U-100 (NOVOLOG) 100 unit/mL (3 mL) InPn pen    lancets Misc    meclizine (ANTIVERT) 25 mg tablet    melatonin (MELATIN) 3 mg tablet    metoprolol tartrate (LOPRESSOR) 25 MG tablet    metroNIDAZOLE (FLAGYL) 500 MG tablet    midodrine (PROAMATINE) 10 MG tablet    nutritional supplements Liqd    pantoprazole (PROTONIX) 40 MG tablet    patiromer calcium sorbitex (VELTASSA) 8.4 gram PwPk    pen needle, diabetic (BD ULTRA-FINE MARIS PEN NEEDLE) 32 gauge x 5/32" Ndle    pregabalin (LYRICA) 75 MG capsule    sevelamer HCL (RENAGEL) 800 MG Tab    torsemide 40 mg Tab    venlafaxine (EFFEXOR) 37.5 MG Tab    vit C,Z-Mr-xrwwb-lutein-zeaxan (PRESERVISION AREDS 2) 699-116-92-1 mg-unit-mg-mg Cap    vitamins  A,C,E-zinc-copper 14,320-226-200 unit-mg-unit Cap     Family History       Problem Relation (Age of Onset)    Arthritis Father    Breast cancer Mother, Paternal Aunt    Cancer Mother, Paternal Aunt    Diabetes Paternal Grandfather    Hyperlipidemia Sister    Vision loss Father, Mother, Sister          Tobacco Use    Smoking status: Never    Smokeless tobacco: Never   Substance and Sexual Activity    Alcohol use: No    Drug use: Never    Sexual activity: Not Currently     Partners: Male     Birth control/protection: See Surgical Hx     Comment: :      Review " of Systems   Constitutional:  Negative for activity change, appetite change, fatigue and fever.   Respiratory:  Positive for cough, chest tightness and shortness of breath. Negative for wheezing.    Cardiovascular:  Negative for chest pain and leg swelling.   Gastrointestinal:  Negative for abdominal pain, constipation, diarrhea, nausea and vomiting.   Genitourinary: Negative.    Musculoskeletal: Negative.    Skin:  Negative for rash.   Neurological:  Negative for headaches.   Psychiatric/Behavioral: Negative.       Objective:     Vital Signs (Most Recent):  Temp: 99.1 °F (37.3 °C) (02/12/24 0325)  Pulse: 104 (02/12/24 1000)  Resp: (!) 21 (02/12/24 1000)  BP: (!) 153/87 (02/12/24 1000)  SpO2: 100 % (02/12/24 1000) Vital Signs (24h Range):  Temp:  [97.6 °F (36.4 °C)-99.1 °F (37.3 °C)] 99.1 °F (37.3 °C)  Pulse:  [100-157] 104  Resp:  [16-56] 21  SpO2:  [94 %-100 %] 100 %  BP: ()/(54-96) 153/87        Body mass index is 24.79 kg/m².  Body surface area is 1.76 meters squared.    I/O last 3 completed shifts:  In: 700 [Other:700]  Out: 2200 [Other:2200]     Physical Exam  Constitutional:       General: She is not in acute distress.     Appearance: She is not ill-appearing or toxic-appearing.   HENT:      Head: Normocephalic and atraumatic.      Nose: Nose normal.   Cardiovascular:      Comments: Right chest tunneled dialysis catheter  Pulmonary:      Breath sounds: Rhonchi present.   Abdominal:      General: Abdomen is flat. There is no distension.      Tenderness: There is no abdominal tenderness.   Musculoskeletal:      Right lower leg: No edema.      Left lower leg: No edema.      Comments: Left upper AVG, good thrill and hum   Skin:     General: Skin is warm.      Coloration: Skin is not jaundiced.      Findings: No bruising.   Neurological:      Mental Status: She is alert.          Significant Labs:  All labs within the past 24 hours have been reviewed.    Significant Imaging:  Labs: Reviewed

## 2024-02-12 NOTE — CLINICAL REVIEW
Sepsis Screen (most recent)       Sepsis Screen () - 02/12/24 5980       Is the patient's history or complaint suggestive of a possible infection? Yes  -    Are there at least two of the following signs and symptoms present? Yes  -    Sepsis signs/symptoms - Tachycardia Tachycardia     >90  -    Sepsis signs/symptoms - Tachypnea Tachypnea     >20  -    Sepsis signs/symptoms - WBC WBC < 4,000 or WBC > 12,000  -    Are any of the following organ dysfunction criteria present and not considered to be due to a chronic condition? Yes   ESRD-HD -    Organ Dysfunction Criteria Lactate > 2.0  -    Organ Dysfunction Criteria - O2 O2 Saturation < 95% on room air  -    Initiate Sepsis Protocol No  -    Reason sepsis not considered Pt. receiving appropriate management  -              User Key  (r) = Recorded By, (t) = Taken By, (c) = Cosigned By      Initials Name    Cristina Brunson RN

## 2024-02-12 NOTE — ASSESSMENT & PLAN NOTE
Goal hemoglobin in ESRD is 10-12  Hemoglobin   Date Value Ref Range Status   02/12/2024 10.6 (L) 12.0 - 16.0 g/dL Final     Iron   Date Value Ref Range Status   02/02/2024 52 30 - 160 ug/dL Final     Transferrin   Date Value Ref Range Status   02/02/2024 121 (L) 200 - 375 mg/dL Final     TIBC   Date Value Ref Range Status   02/02/2024 179 (L) 250 - 450 ug/dL Final     Saturated Iron   Date Value Ref Range Status   02/02/2024 29 20 - 50 % Final     Ferritin   Date Value Ref Range Status   02/02/2024 1,803 (H) 20.0 - 300.0 ng/mL Final

## 2024-02-12 NOTE — CONSULTS
[facility]  Cardiology  Consult Note    Patient Name: Lily Green  MRN: [id number]  Admission Date: 2/12/2024  Hospital Length of Stay: 0 days  Code Status: Full Code   Attending Provider: Meghan Trujillo MD   Consulting Provider: Khoi Michel MD  Primary Care Physician: Pavel Calixto MD  Principal Problem:Acute hypoxemic respiratory failure    Patient information was obtained from patient and ER records.     Inpatient consult to Cardiology  Consult performed by: Khoi Michel MD  Consult ordered by: Kaylyn Jacobo DNP        Subjective:     Chief Complaint:  Shortness of breath      HPI:   73 y.o. 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  She subsequently developed GIB and discovery of nonbleeding gastric ulcers on EGD made full anticoagulation high risk, so it was stopped. She was ultimately Dc'd to [facility] in [place].  Patient states that at rehab she was having dyspnea on exertion but early this morning she felt acutely short of breath for which he presented to the emergency room.  She was initially placed on BiPAP and renal dialyze her.  Eventually she was placed on 2 L nasal cannula but still very tachypneic on evaluation and very tenuous from respiratory standpoint.  Patient denies having any chest pain, palpitations, orthopnea, presyncope, syncope.  She had an echo that showed a new drop in her EF.  Also her troponins peaked up to 2.  Her initial presenting rhythm was atrial flutter with rapid ventricular rate 2:1 and currently she is in normal sinus rhythm        Past Medical History:   Diagnosis Date    Anxiety     Celiac disease 05/2018    Celiac disease     Depression     Diabetes mellitus     Family history of breast cancer in mother       at age 68    Hyperlipidemia     Hypertension     Meniere disease     Meniere's disease, unspecified ear     Menopause     Peptic ulcer     Reflux esophagitis     Urinary tract infection     Vaginal infection     Vaginal Pap smear 2014    Pap/Hpv Negative        Past Surgical History:   Procedure Laterality Date    APPENDECTOMY      BREAST SURGERY      Tags    CARPAL TUNNEL RELEASE Bilateral 2017    ,     CLOSURE, COLOSTOMY Left 2023    Procedure: CLOSURE, COLOSTOMY;  Surgeon: Manuel Javier MD;  Location: [facility] OR;  Service: General;  Laterality: Left;    COLONOSCOPY  2018    Normal  ( Juan A)     COLOSTOMY Right 2023    Procedure: CREATION, COLOSTOMY;  Surgeon: Manuel Javier MD;  Location: [facility] OR;  Service: General;  Laterality: Right;    DILATION AND CURETTAGE OF UTERUS  1986    DUPUYTREN CONTRACTURE RELEASE Bilateral 2017    ESOPHAGOGASTRODUODENOSCOPY N/A 2024    Procedure: EGD (ESOPHAGOGASTRODUODENOSCOPY);  Surgeon: Yamilet Walters MD;  [facility]  Service: Gastroenterology;  Laterality: N/A;    ESOPHAGOGASTRODUODENOSCOPY N/A 2024    Procedure: EGD (ESOPHAGOGASTRODUODENOSCOPY);  Surgeon: Yamilet Walters MD;  [facility]  Service: Gastroenterology;  Laterality: N/A;    HYSTERECTOMY      BEAU w/ appy, no BSO     IMPLANTATION OF LEADLESS PACEMAKER N/A 2024    Procedure: INSERTION, CARDIAC PACEMAKER, LEADLESS;  Surgeon: LENIN Frances MD;  Location: [facility] EP LAB;  Service: Cardiology;  Laterality: N/A;  SB, LEADLESS PPM (MICRA), SAM, ANES, EH, CVICU [id number]    INJECTION OF NEUROLYTIC AGENT AROUND LUMBAR SYMPATHETIC NERVE N/A 2022    Procedure: BLOCK, LUMBAR SYMPATHETIC;  Surgeon: Souleymane Rizo Jr., MD;  Location: [facility] PAIN MGT;  Service: Pain Management;  Laterality: N/A;  no pacemaker   pt is diabetic     INJECTION OF NEUROLYTIC AGENT AROUND LUMBAR SYMPATHETIC NERVE N/A 2022    Procedure:  BLOCK, LUMBAR SYMPATHETIC;  Surgeon: Souleymane Rizo Jr., MD;  Location: [facility] PAIN MGT;  Service: Pain Management;  Laterality: N/A;  diabetic     INSERTION OF TUNNELED CENTRAL VENOUS HEMODIALYSIS CATHETER Right 1/25/2023    Procedure: INSERTION, CATHETER, HEMODIALYSIS, DUAL LUMEN;  Surgeon: Manuel Javier MD;  Location: [facility] OR;  Service: General;  Laterality: Right;    INSERTION, CATHETER, TUNNELED Left 1/28/2023    Procedure: Insertion,catheter,tunneled;  Surgeon: Watson Fontenot MD;  Location: [facility] OR;  Service: General;  Laterality: Left;    LAPAROTOMY, EXPLORATORY N/A 1/14/2023    Procedure: LAPAROTOMY, EXPLORATORY;  Surgeon: Manuel Javier MD;  Location: [facility] OR;  Service: General;  Laterality: N/A;    LAPAROTOMY, EXPLORATORY N/A 1/16/2023    Procedure: LAPAROTOMY, EXPLORATORY;  Surgeon: Manuel Javier MD;  Location: [facility] OR;  Service: General;  Laterality: N/A;    LYMPHADENECTOMY Right 1/2/2024    Procedure: LYMPHADENECTOMY;  Surgeon: Tan Thomson MD;  Location: [facility] OR 2ND FLR;  Service: Cardiothoracic;  Laterality: Right;    PACEMAKER, TEMPORARY, TRANSVENOUS, PEDIATRIC Right 1/12/2024    Procedure: Pacemaker, Temporary, Transvenous;  Surgeon: Todd Carlisle MD;  Location: [facility] CATH LAB;  Service: Cardiology;  Laterality: Right;    REMOVAL OF CATHETER Right 1/28/2023    Procedure: REMOVAL, CATHETER;  Surgeon: Watson Fontenot MD;  Location: [facility] OR;  Service: General;  Laterality: Right;    REMOVAL, TUNNELED CATH Right 1/25/2023    Procedure: REMOVAL, TUNNELED CATH;  Surgeon: Manuel Javier MD;  Location: [facility] OR;  Service: General;  Laterality: Right;    ROBOTIC BRONCHOSCOPY N/A 10/20/2023    Procedure: ROBOTIC BRONCHOSCOPY;  Surgeon: Mayda Monzon MD;  Location: [facility] OR 2ND FLR;  Service: Pulmonary;  Laterality: N/A;    SHOULDER SURGERY  2009 & 2010    right rotator cuff    THORACOTOMY Right 1/2/2024    Procedure: THORACOTOMY;  Surgeon: Tan Thomson MD;  Location:  [facility] OR [address] [place] [address];  Service: Cardiothoracic;  Laterality: Right;    THORACOTOMY, WITH INITIAL THERAPEUTIC WEDGE RESECTION OF LUNG Right 1/2/2024    Procedure: THORACOTOMY, WITH INITIAL THERAPEUTIC WEDGE RESECTION OF LUNG;  Surgeon: Tan Thomson MD;  Location: [facility] OR [address] [place] [address];  Service: Cardiothoracic;  Laterality: Right;    TONSILLECTOMY      UPPER GASTROINTESTINAL ENDOSCOPY  04/2018       Review of patient's allergies indicates:   Allergen Reactions    Crestor [rosuvastatin] Swelling    Gluten protein        No current facility-administered medications on file prior to encounter.     Current Outpatient Medications on File Prior to Encounter   Medication Sig    atorvastatin (LIPITOR) 40 MG tablet Take 1 tablet (40 mg total) by mouth once daily. (Patient taking differently: Take 40 mg by mouth every evening.)    B complex-vitamin C-folic acid (MELLISSA-BENJY) 0.8 mg Tab Take 1 tablet (0.8 mg total) by mouth once daily.    betahistine HCl (BETAHISTINE, BULK, MISC)     blood sugar diagnostic (TRUE METRIX GLUCOSE TEST STRIP) Strp USE ONE STRIP FOR TESTING 2 TIMES A DAY    blood-glucose meter kit Use to test twice a day    calcium-vitamin D3 (OS-CIPRIANO 500 + D3) 500 mg-5 mcg (200 unit) per tablet Take 1 tablet by mouth once daily.    cefpodoxime (VANTIN) 200 MG tablet Take 1 tablet (200 mg total) by mouth every evening. for 13 days    dronabinoL (MARINOL) 5 MG capsule Take 1 capsule (5 mg total) by mouth 2 (two) times daily before meals.    epoetin noble-epbx (RETACRIT) 10,000 unit/mL imjection Inject 1 mL (10,000 Units total) into the skin every Mon, Wed, Fri. HOLD IF HEMOGLOBIN is 10 or GREATER    DO NOT SHAKE  DO NOT DILUTE  DO NOT MIX with other drug solutions    ferrous gluconate (FERGON) 324 MG tablet Take 1 tablet (324 mg total) by mouth daily with breakfast.    insulin aspart U-100 (NOVOLOG) 100 unit/mL (3 mL) InPn pen Inject before meals:  150-200=+1, 201-250=+2, 251-300=+3; 301-350=+4, over 350=+5 units    lancets  "Misc 1 lancet by Misc.(Non-Drug; Combo Route) route 4 (four) times daily.    meclizine (ANTIVERT) 25 mg tablet Take 1 tablet (25 mg total) by mouth 3 (three) times daily as needed for Dizziness.    melatonin (MELATIN) 3 mg tablet Take 3 tablets (9 mg total) by mouth nightly as needed for Insomnia.    metoprolol tartrate (LOPRESSOR) 25 MG tablet Take 0.5 tablets (12.5 mg total) by mouth 2 (two) times daily.    metroNIDAZOLE (FLAGYL) 500 MG tablet Take 1 tablet (500 mg total) by mouth every 12 (twelve) hours. for 13 days    midodrine (PROAMATINE) 10 MG tablet Take 1 tablet (10 mg total) by mouth 2 (two) times daily with meals.    nutritional supplements Liqd Take 237 mLs by mouth 4 (four) times daily.    pantoprazole (PROTONIX) 40 MG tablet Take 1 tablet (40 mg total) by mouth 2 (two) times daily before meals.    patiromer calcium sorbitex (VELTASSA) 8.4 gram PwPk Take 2 packets (16.8 g total) by mouth once daily.    pen needle, diabetic (BD ULTRA-FINE MARIS PEN NEEDLE) 32 gauge x 5/32" Ndle 1 Device by Misc.(Non-Drug; Combo Route) route 4 (four) times daily with meals and nightly.    pregabalin (LYRICA) 75 MG capsule Take 1 capsule (75 mg total) by mouth Daily. Give after HD    sevelamer HCL (RENAGEL) 800 MG Tab Take 2 tablets (1,600 mg total) by mouth 3 (three) times daily with meals.    torsemide 40 mg Tab Take 40 mg by mouth once daily.    venlafaxine (EFFEXOR) 37.5 MG Tab Take 1 tablet (37.5 mg total) by mouth once daily.    vit C,B-Ny-uwagq-lutein-zeaxan (PRESERVISION AREDS 2) [phone] mg-unit-mg-mg Cap     vitamins  A,C,E-zinc-copper 14,320-226-200 unit-mg-unit Cap Take 1 tablet by mouth once daily.     Family History       Problem Relation (Age of Onset)    Arthritis Father    Breast cancer Mother, Paternal Aunt    Cancer Mother, Paternal Aunt    Diabetes Paternal Grandfather    Hyperlipidemia Sister    Vision loss Father, Mother, Sister          Tobacco Use    Smoking status: Never    Smokeless tobacco: " Never   Substance and Sexual Activity    Alcohol use: No    Drug use: Never    Sexual activity: Not Currently     Partners: Male     Birth control/protection: See Surgical Hx     Comment: :      Review of Systems   Constitutional: Negative.   HENT: Negative.     Eyes: Negative.    Cardiovascular:  Positive for dyspnea on exertion.   Respiratory: Negative.     Endocrine: Negative.    Hematologic/Lymphatic: Negative.    Musculoskeletal: Negative.    Gastrointestinal: Negative.    Genitourinary: Negative.    Neurological: Negative.      Objective:     Vital Signs (Most Recent):  Temp: 98.2 °F (36.8 °C) (02/12/24 1600)  Pulse: (!) 116 (02/12/24 1700)  Resp: (!) 50 (02/12/24 1700)  BP: 118/76 (02/12/24 1700)  SpO2: (!) 94 % (02/12/24 1700) Vital Signs (24h Range):  Temp:  [97.6 °F (36.4 °C)-99.1 °F (37.3 °C)] 98.2 °F (36.8 °C)  Pulse:  [] 116  Resp:  [16-56] 50  SpO2:  [88 %-100 %] 94 %  BP: ()/(54-98) 118/76        Body mass index is 24.79 kg/m².    SpO2: (!) 94 %         Intake/Output Summary (Last 24 hours) at 2/12/2024 1725  Last data filed at 2/12/2024 1700  Gross per 24 hour   Intake 1042.85 ml   Output 2200 ml   Net -1157.15 ml       Lines/Drains/Airways       Central Venous Catheter Line  Duration             Permacath right subclavian -- days         Hemodialysis Catheter 01/25/23 1501 right internal jugular 383 days              Peripheral Intravenous Line  Duration                  Hemodialysis AV Fistula 09/01/23 0800 Left forearm 164 days         Peripheral IV - Single Lumen 02/12/24 0207 18 G Anterior;Proximal;Right Forearm <1 day                     Physical Exam  Constitutional:       Appearance: She is ill-appearing and toxic-appearing.   HENT:      Head: Normocephalic.   Cardiovascular:      Rate and Rhythm: Regular rhythm. Tachycardia present.      Pulses: Normal pulses.      Comments: Gallop present  Pulmonary:      Effort: Pulmonary effort is normal.      Breath sounds: Rhonchi  present.      Comments: Mild crackles present at the bases  Abdominal:      General: Abdomen is flat. Bowel sounds are normal.      Palpations: Abdomen is soft.   Musculoskeletal:         General: Normal range of motion.      Cervical back: Normal range of motion.   Skin:     General: Skin is warm.      Capillary Refill: Capillary refill takes less than 2 seconds.   Neurological:      General: No focal deficit present.      Mental Status: She is alert and oriented to person, place, and time.          Significant Labs: All pertinent lab results from the last 24 hours have been reviewed.    Significant Imaging: EKG:  On presentation her EKG showed atrial flutter with 2:1 conduction  Assessment and Plan:     Atrial flutter  Presenting rhythm was atrial flutter with 2:1 conduction with spontaneously converted.    She also has history of atrial fibrillation.  Currently not on anticoagulation because of previous GI bleed in the ulcers on EGD.     New onset of congestive heart failure  Patient presents with acute onset of respiratory failure and was found to have a drop in her EF to 15%.  Presenting rhythm was atrial flutter with 2:1 now she is now sinus tachycardic  Also has elevated troponin that trended up to 2.  Patient's lower extremities are cold and appears mottled which is concerning for shock  SvO2 checked from subclavian line is 56   CO/CI 4.2/2.3    lactic acid 1.8    Plan  Recommend to keep her on metoprolol succinate to 25 mg daily and titrate up  Recommend to start patient on valsartan 40 mg b.i.d. and titrate up as tolerated  Patient will need ischemic workup so please consult intervention cardiology for left heart catheterization once she is stable from respiratory standpoint.             VTE Risk Mitigation (From admission, onward)           Ordered     heparin (porcine) injection 5,000 Units  Every 8 hours         02/12/24 0416     IP VTE HIGH RISK PATIENT  Once         02/12/24 0416     Place sequential  compression device  Until discontinued         02/12/24 0416     heparin (porcine) injection 1,000 Units  As needed (PRN)         02/12/24 5093                    Thank you for your consult. I will follow-up with patient. Please contact us if you have any additional questions.    Khoi Michel MD  Cardiology   [facility]

## 2024-02-12 NOTE — PROGRESS NOTES
02/12/24 0325   Vital Signs   Pulse (!) 120   Heart Rate Source Monitor;Continuous   Resp (!) 41   SpO2 99 %   Pulse Oximetry Type Continuous   Oxygen Concentration (%) 40   Device (Oxygen Therapy) BIPAP   BP (!) 169/95   MAP (mmHg) 123   BP Location Right leg   BP Method Automatic   Patient Position Lying     Pt is in ED5 alert and on Bipap.  Bedside HD tx initiated aseptically via RIJ TDC without issues.  UF goal of 3L as tolerated.

## 2024-02-12 NOTE — ASSESSMENT & PLAN NOTE
ESRD   Hypoxic resp failure - improved with 1.5 liter removal;   ECHO 2/12 with newly depressed EF of 10-15% from 60-65% one month prior    Plan/Recommendation  -newly depressed EF, recommend cardiology consult  -sp HD today; continue to monitor for other needs  -Keep MAP > 65  -Keep hemoglobin > 7  -Strict ins and outs  -Avoid nephrotoxic agents if possible and renally dose medications  -Avoid drastic hemodynamic changes if possible

## 2024-02-12 NOTE — CONSULTS
[facility] - Emergency Dept  Nephrology  Consult Note    Patient Name: Lily Green  MRN: [id number]  Admission Date: 2024  Hospital Length of Stay: 0 days  Attending Provider: Meghan Trujillo MD   Primary Care Physician: Pavel Calixto MD  Principal Problem:Acute hypoxemic respiratory failure    Inpatient consult to Nephrology  Consult performed by: Johnson Hernandez MD  Consult ordered by: Darrell Dang MD        Subjective:     HPI: 73 year old female with a histoyr of ESRD(MW), HTN, HLD, T2DM, IBS, RML carcinoid tumor admitted to [facility] for hypoxic respiratory distress. Initially thought to be from volume overload, however emergent HD done overnight resulted in Afib with RVR. She initially presented with hypoxic resp failure requiring NC satting 70% with 2 liters. MICU was called and bipap was started with improvement in respiratory status.     She was previously admitted to [facility] and underwent a right thoracotomy for newly found RML carcinoid tumor. Course was complicated by right sided chest tube placement and new onset afib with RVR and GIB (EGD with gastric ulcers).     Outpatient ESRD  Hemodialysis  Outpatient nephrologist: Dr. Vays  Outpatient center: [facility]  Dialysis schedule: [facility]  Last treatment: 2/10/24  Anuric: Minimal  Access: right tunnled dialysis cather; has left fistula, pending maturation      Past Medical History:   Diagnosis Date    Anxiety     Celiac disease 2018    Celiac disease     Depression     Diabetes mellitus     Family history of breast cancer in mother      at age 68    Hyperlipidemia     Hypertension     Meniere disease     Meniere's disease, unspecified ear     Menopause     Peptic ulcer     Reflux esophagitis     Urinary tract infection     Vaginal infection     Vaginal Pap smear 2014    Pap/Hpv Negative        Past Surgical History:   Procedure Laterality Date    APPENDECTOMY      BREAST SURGERY      Tags    CARPAL TUNNEL RELEASE Bilateral 2017     ,     CLOSURE, COLOSTOMY Left 1/16/2023    Procedure: CLOSURE, COLOSTOMY;  Surgeon: Manuel Javier MD;  Location: [facility] OR;  Service: General;  Laterality: Left;    COLONOSCOPY  04/2018    Normal  ( Juan A)     COLOSTOMY Right 1/16/2023    Procedure: CREATION, COLOSTOMY;  Surgeon: Manuel Javier MD;  Location: [facility] OR;  Service: General;  Laterality: Right;    DILATION AND CURETTAGE OF UTERUS  1986    DUPUYTREN CONTRACTURE RELEASE Bilateral 09/2017    ESOPHAGOGASTRODUODENOSCOPY N/A 1/24/2024    Procedure: EGD (ESOPHAGOGASTRODUODENOSCOPY);  Surgeon: Yamilet Walters MD;  Location: [facility] ENDO (2ND FLR);  Service: Gastroenterology;  Laterality: N/A;    ESOPHAGOGASTRODUODENOSCOPY N/A 1/26/2024    Procedure: EGD (ESOPHAGOGASTRODUODENOSCOPY);  Surgeon: Yamilet Walters MD;  Location: [facility] ENDO ([facility] FLR);  Service: Gastroenterology;  Laterality: N/A;    HYSTERECTOMY  1987    BEAU w/ appy, no BSO     IMPLANTATION OF LEADLESS PACEMAKER N/A 1/16/2024    Procedure: INSERTION, CARDIAC PACEMAKER, LEADLESS;  Surgeon: LENIN Frances MD;  Location: [facility] EP LAB;  Service: Cardiology;  Laterality: N/A;  SB, LEADLESS PPM (MICRA), MDT, ANES, EH, CVICU [id number]    INJECTION OF NEUROLYTIC AGENT AROUND LUMBAR SYMPATHETIC NERVE N/A 1/6/2022    Procedure: BLOCK, LUMBAR SYMPATHETIC;  Surgeon: Souleymane Rizo Jr., MD;  Location: [facility] PAIN MGT;  Service: Pain Management;  Laterality: N/A;  no pacemaker   pt is diabetic     INJECTION OF NEUROLYTIC AGENT AROUND LUMBAR SYMPATHETIC NERVE N/A 8/25/2022    Procedure: BLOCK, LUMBAR SYMPATHETIC;  Surgeon: Souleymane Rizo Jr., MD;  Location: [facility] PAIN MGT;  Service: Pain Management;  Laterality: N/A;  diabetic     INSERTION OF TUNNELED CENTRAL VENOUS HEMODIALYSIS CATHETER Right 1/25/2023    Procedure: INSERTION, CATHETER, HEMODIALYSIS, DUAL LUMEN;  Surgeon: Manuel Javier MD;  Location: [facility] OR;  Service: General;  Laterality: Right;    INSERTION, CATHETER, TUNNELED Left  1/28/2023    Procedure: Insertion,catheter,tunneled;  Surgeon: Watson Fontenot MD;  Location: [facility] OR;  Service: General;  Laterality: Left;    LAPAROTOMY, EXPLORATORY N/A 1/14/2023    Procedure: LAPAROTOMY, EXPLORATORY;  Surgeon: Manuel Javier MD;  Location: [facility] OR;  Service: General;  Laterality: N/A;    LAPAROTOMY, EXPLORATORY N/A 1/16/2023    Procedure: LAPAROTOMY, EXPLORATORY;  Surgeon: Manuel Javier MD;  Location: [facility] OR;  Service: General;  Laterality: N/A;    LYMPHADENECTOMY Right 1/2/2024    Procedure: LYMPHADENECTOMY;  Surgeon: Tan Thomson MD;  Location: [facility] OR 2ND FLR;  Service: Cardiothoracic;  Laterality: Right;    PACEMAKER, TEMPORARY, TRANSVENOUS, PEDIATRIC Right 1/12/2024    Procedure: Pacemaker, Temporary, Transvenous;  Surgeon: Todd Carlisle MD;  Location: [facility] CATH LAB;  Service: Cardiology;  Laterality: Right;    REMOVAL OF CATHETER Right 1/28/2023    Procedure: REMOVAL, CATHETER;  Surgeon: Watson Fontenot MD;  Location: [facility] OR;  Service: General;  Laterality: Right;    REMOVAL, TUNNELED CATH Right 1/25/2023    Procedure: REMOVAL, TUNNELED CATH;  Surgeon: Manuel Javier MD;  Location: [facility] OR;  Service: General;  Laterality: Right;    ROBOTIC BRONCHOSCOPY N/A 10/20/2023    Procedure: ROBOTIC BRONCHOSCOPY;  Surgeon: Mayda Monzon MD;  Location: [facility] OR 2ND FLR;  Service: Pulmonary;  Laterality: N/A;    SHOULDER SURGERY  2009 & 2010    right rotator cuff    THORACOTOMY Right 1/2/2024    Procedure: THORACOTOMY;  Surgeon: Tan Thomson MD;  Location: [facility] OR 2ND FLR;  Service: Cardiothoracic;  Laterality: Right;    THORACOTOMY, WITH INITIAL THERAPEUTIC WEDGE RESECTION OF LUNG Right 1/2/2024    Procedure: THORACOTOMY, WITH INITIAL THERAPEUTIC WEDGE RESECTION OF LUNG;  Surgeon: Tan Thomson MD;  Location: [facility] OR 2ND FLR;  Service: Cardiothoracic;  Laterality: Right;    TONSILLECTOMY      UPPER GASTROINTESTINAL ENDOSCOPY  04/2018       Review of patient's  allergies indicates:   Allergen Reactions    Crestor [rosuvastatin] Swelling    Gluten protein      Current Facility-Administered Medications   Medication Frequency    0.9%  NaCl infusion Once    acetaminophen tablet 650 mg Q4H PRN    atorvastatin tablet 40 mg QHS    capsaicin-skin cleanser patch 1 patch 1 time in Clinic/HOD    capsaicin-skin cleanser patch 2 patch 1 time in Clinic/HOD    dextrose 10% bolus 125 mL 125 mL PRN    dextrose 10% bolus 250 mL 250 mL PRN    droNABinol capsule 5 mg BID AC    ferrous gluconate tablet 324 mg Daily with breakfast    glucagon (human recombinant) injection 1 mg PRN    glucose chewable tablet 16 g PRN    glucose chewable tablet 24 g PRN    heparin (porcine) injection 1,000 Units PRN    heparin (porcine) injection 5,000 Units Q8H    insulin aspart U-100 pen 0-5 Units QID (AC + HS) PRN    melatonin tablet 6 mg Nightly PRN    metoprolol tartrate (LOPRESSOR) split tablet 12.5 mg BID    mupirocin 2 % ointment BID    ondansetron injection 4 mg Q6H PRN    pantoprazole EC tablet 40 mg BID AC    piperacillin-tazobactam (ZOSYN) 4.5 g in dextrose 5 % in water (D5W) 100 mL IVPB (MB+) Q12H    potassium, sodium phosphates 280-160-250 mg packet 2 packet QID (AC & HS)    pregabalin capsule 75 mg Daily    sevelamer carbonate tablet 1,600 mg TID WM    sodium chloride 0.9% bolus 250 mL 250 mL PRN    sodium chloride 0.9% flush 10 mL PRN    vancomycin - pharmacy to dose pharmacy to manage frequency    venlafaxine tablet 37.5 mg Daily     Current Outpatient Medications   Medication    atorvastatin (LIPITOR) 40 MG tablet    B complex-vitamin C-folic acid (MELLISSA-BENJY) 0.8 mg Tab    betahistine HCl (BETAHISTINE, BULK, MISC)    blood sugar diagnostic (TRUE METRIX GLUCOSE TEST STRIP) Strp    blood-glucose meter kit    calcium-vitamin D3 (OS-CIPRIANO 500 + D3) 500 mg-5 mcg (200 unit) per tablet    cefpodoxime (VANTIN) 200 MG tablet    dronabinoL (MARINOL) 5 MG capsule    epoetin noble-epbx (RETACRIT) 10,000  "unit/mL imjection    ferrous gluconate (FERGON) 324 MG tablet    insulin aspart U-100 (NOVOLOG) 100 unit/mL (3 mL) InPn pen    lancets Misc    meclizine (ANTIVERT) 25 mg tablet    melatonin (MELATIN) 3 mg tablet    metoprolol tartrate (LOPRESSOR) 25 MG tablet    metroNIDAZOLE (FLAGYL) 500 MG tablet    midodrine (PROAMATINE) 10 MG tablet    nutritional supplements Liqd    pantoprazole (PROTONIX) 40 MG tablet    patiromer calcium sorbitex (VELTASSA) 8.4 gram PwPk    pen needle, diabetic (BD ULTRA-FINE MARIS PEN NEEDLE) 32 gauge x 5/32" Ndle    pregabalin (LYRICA) 75 MG capsule    sevelamer HCL (RENAGEL) 800 MG Tab    torsemide 40 mg Tab    venlafaxine (EFFEXOR) 37.5 MG Tab    vit C,B-Cb-siimf-lutein-zeaxan (PRESERVISION AREDS 2) 077-619-41-1 mg-unit-mg-mg Cap    vitamins  A,C,E-zinc-copper 14,320-226-200 unit-mg-unit Cap     Family History       Problem Relation (Age of Onset)    Arthritis Father    Breast cancer Mother, Paternal Aunt    Cancer Mother, Paternal Aunt    Diabetes Paternal Grandfather    Hyperlipidemia Sister    Vision loss Father, Mother, Sister          Tobacco Use    Smoking status: Never    Smokeless tobacco: Never   Substance and Sexual Activity    Alcohol use: No    Drug use: Never    Sexual activity: Not Currently     Partners: Male     Birth control/protection: See Surgical Hx     Comment: :      Review of Systems   Constitutional:  Negative for activity change, appetite change, fatigue and fever.   Respiratory:  Positive for cough, chest tightness and shortness of breath. Negative for wheezing.    Cardiovascular:  Negative for chest pain and leg swelling.   Gastrointestinal:  Negative for abdominal pain, constipation, diarrhea, nausea and vomiting.   Genitourinary: Negative.    Musculoskeletal: Negative.    Skin:  Negative for rash.   Neurological:  Negative for headaches.   Psychiatric/Behavioral: Negative.       Objective:     Vital Signs (Most Recent):  Temp: 99.1 °F (37.3 °C) (02/12/24 " 0325)  Pulse: 104 (02/12/24 1000)  Resp: (!) 21 (02/12/24 1000)  BP: (!) 153/87 (02/12/24 1000)  SpO2: 100 % (02/12/24 1000) Vital Signs (24h Range):  Temp:  [97.6 °F (36.4 °C)-99.1 °F (37.3 °C)] 99.1 °F (37.3 °C)  Pulse:  [100-157] 104  Resp:  [16-56] 21  SpO2:  [94 %-100 %] 100 %  BP: ()/(54-96) 153/87        Body mass index is 24.79 kg/m².  Body surface area is 1.76 meters squared.    I/O last 3 completed shifts:  In: 700 [Other:700]  Out: 2200 [Other:2200]     Physical Exam  Constitutional:       General: She is not in acute distress.     Appearance: She is not ill-appearing or toxic-appearing.   HENT:      Head: Normocephalic and atraumatic.      Nose: Nose normal.   Cardiovascular:      Comments: Right chest tunneled dialysis catheter  Pulmonary:      Breath sounds: Rhonchi present.   Abdominal:      General: Abdomen is flat. There is no distension.      Tenderness: There is no abdominal tenderness.   Musculoskeletal:      Right lower leg: No edema.      Left lower leg: No edema.      Comments: Left upper AVG, good thrill and hum   Skin:     General: Skin is warm.      Coloration: Skin is not jaundiced.      Findings: No bruising.   Neurological:      Mental Status: She is alert.          Significant Labs:  All labs within the past 24 hours have been reviewed.    Significant Imaging:  Labs: Reviewed  Assessment/Plan:     Renal/  ESRD (end stage renal disease)  ESRD   Hypoxic resp failure - improved with 1.5 liter removal;   ECHO 2/12 with newly depressed EF of 10-15% from 60-65% one month prior    Plan/Recommendation  -newly depressed EF, recommend cardiology consult  -sp HD today; continue to monitor for other needs  -Keep MAP > 65  -Keep hemoglobin > 7  -Strict ins and outs  -Avoid nephrotoxic agents if possible and renally dose medications  -Avoid drastic hemodynamic changes if possible        Oncology  Anemia in ESRD (end-stage renal disease)  Goal hemoglobin in ESRD is 10-12  Hemoglobin   Date  Value Ref Range Status   02/12/2024 10.6 (L) 12.0 - 16.0 g/dL Final     Iron   Date Value Ref Range Status   02/02/2024 52 30 - 160 ug/dL Final     Transferrin   Date Value Ref Range Status   02/02/2024 121 (L) 200 - 375 mg/dL Final     TIBC   Date Value Ref Range Status   02/02/2024 179 (L) 250 - 450 ug/dL Final     Saturated Iron   Date Value Ref Range Status   02/02/2024 29 20 - 50 % Final     Ferritin   Date Value Ref Range Status   02/02/2024 1,803 (H) 20.0 - 300.0 ng/mL Final       Endocrine  Secondary hyperparathyroidism  Calcium   Date Value Ref Range Status   02/12/2024 8.9 8.7 - 10.5 mg/dL Final     Phosphorus   Date Value Ref Range Status   02/12/2024 1.6 (L) 2.7 - 4.5 mg/dL Final     PTH, Intact   Date Value Ref Range Status   02/02/2024 111.8 (H) 9.0 - 77.0 pg/mL Final         Type 2 diabetes mellitus with diabetic polyneuropathy, without long-term current use of insulin  -        Thank you for your consult. I will follow-up with patient. Please contact us if you have any additional questions.    Johnson Hernandez MD  Nephrology  [facility] - Emergency Dept

## 2024-02-12 NOTE — PROGRESS NOTES
02/12/24 0625   Vital Signs   Pulse (!) 133   Heart Rate Source Monitor   Resp (!) 30   SpO2 99 %   Pulse Oximetry Type Continuous   /72   MAP (mmHg) 87   BP Location Right leg   BP Method Automatic   Patient Position Lying     HD tx completed and pt tolerated fair.  Net UF: 1.5L  Tx time: 3hrs.  Pts BP was trending down during the tx.  HR went up an hour towards the end of the tx.  MD is aware.  Pt is alert, no distress noted.

## 2024-02-12 NOTE — SUBJECTIVE & OBJECTIVE
Past Medical History:   Diagnosis Date    Anxiety     Celiac disease 2018    Celiac disease     Depression     Diabetes mellitus     Family history of breast cancer in mother      at age 68    Hyperlipidemia     Hypertension     Meniere disease     Meniere's disease, unspecified ear     Menopause     Peptic ulcer     Reflux esophagitis     Urinary tract infection     Vaginal infection     Vaginal Pap smear 2014    Pap/Hpv Negative        Past Surgical History:   Procedure Laterality Date    APPENDECTOMY      BREAST SURGERY      Tags    CARPAL TUNNEL RELEASE Bilateral 2017    ,     CLOSURE, COLOSTOMY Left 2023    Procedure: CLOSURE, COLOSTOMY;  Surgeon: Manuel Javier MD;  Location: [facility] OR;  Service: General;  Laterality: Left;    COLONOSCOPY  2018    Normal  ( Juan A)     COLOSTOMY Right 2023    Procedure: CREATION, COLOSTOMY;  Surgeon: Manuel Javier MD;  Location: [facility] OR;  Service: General;  Laterality: Right;    DILATION AND CURETTAGE OF UTERUS  1986    DUPUYTREN CONTRACTURE RELEASE Bilateral 2017    ESOPHAGOGASTRODUODENOSCOPY N/A 2024    Procedure: EGD (ESOPHAGOGASTRODUODENOSCOPY);  Surgeon: Yamilet Walters MD;  Location: [facility] ([address] [place] [address]);  Service: Gastroenterology;  Laterality: N/A;    ESOPHAGOGASTRODUODENOSCOPY N/A 2024    Procedure: EGD (ESOPHAGOGASTRODUODENOSCOPY);  Surgeon: Yamilet Walters MD;  Location: [address]);  Service: Gastroenterology;  Laterality: N/A;    HYSTERECTOMY      BAEU w/ appy, no BSO     IMPLANTATION OF LEADLESS PACEMAKER N/A 2024    Procedure: INSERTION, CARDIAC PACEMAKER, LEADLESS;  Surgeon: LENIN Frances MD;  Location: [facility] EP LAB;  Service: Cardiology;  Laterality: N/A;  SB, LEADLESS PPM (MICRA)SAM, ANES, EH, CVICU [id number]    INJECTION OF NEUROLYTIC AGENT AROUND LUMBAR SYMPATHETIC NERVE N/A 2022    Procedure: BLOCK, LUMBAR SYMPATHETIC;  Surgeon: Souleymane Rizo Jr., MD;  Location: [facility]  PAIN MGT;  Service: Pain Management;  Laterality: N/A;  no pacemaker   pt is diabetic     INJECTION OF NEUROLYTIC AGENT AROUND LUMBAR SYMPATHETIC NERVE N/A 8/25/2022    Procedure: BLOCK, LUMBAR SYMPATHETIC;  Surgeon: Souleymane Rizo Jr., MD;  Location: [facility] PAIN MGT;  Service: Pain Management;  Laterality: N/A;  diabetic     INSERTION OF TUNNELED CENTRAL VENOUS HEMODIALYSIS CATHETER Right 1/25/2023    Procedure: INSERTION, CATHETER, HEMODIALYSIS, DUAL LUMEN;  Surgeon: Manuel Javier MD;  Location: [facility] OR;  Service: General;  Laterality: Right;    INSERTION, CATHETER, TUNNELED Left 1/28/2023    Procedure: Insertion,catheter,tunneled;  Surgeon: Watson Fontenot MD;  Location: [facility] OR;  Service: General;  Laterality: Left;    LAPAROTOMY, EXPLORATORY N/A 1/14/2023    Procedure: LAPAROTOMY, EXPLORATORY;  Surgeon: Manuel Javier MD;  Location: [facility] OR;  Service: General;  Laterality: N/A;    LAPAROTOMY, EXPLORATORY N/A 1/16/2023    Procedure: LAPAROTOMY, EXPLORATORY;  Surgeon: Manuel Javier MD;  Location: [facility] OR;  Service: General;  Laterality: N/A;    LYMPHADENECTOMY Right 1/2/2024    Procedure: LYMPHADENECTOMY;  Surgeon: Tan Thomson MD;  Location: [address];  Service: Cardiothoracic;  Laterality: Right;    PACEMAKER, TEMPORARY, TRANSVENOUS, PEDIATRIC Right 1/12/2024    Procedure: Pacemaker, Temporary, Transvenous;  Surgeon: Todd Carlisle MD;  Location: [facility] CATH LAB;  Service: Cardiology;  Laterality: Right;    REMOVAL OF CATHETER Right 1/28/2023    Procedure: REMOVAL, CATHETER;  Surgeon: Watson Fontenot MD;  Location: [facility] OR;  Service: General;  Laterality: Right;    REMOVAL, TUNNELED CATH Right 1/25/2023    Procedure: REMOVAL, TUNNELED CATH;  Surgeon: Manuel Javier MD;  Location: [facility] OR;  Service: General;  Laterality: Right;    ROBOTIC BRONCHOSCOPY N/A 10/20/2023    Procedure: ROBOTIC BRONCHOSCOPY;  Surgeon: Mayda Monzon MD;  [facility];  Service:  Pulmonary;  Laterality: N/A;    SHOULDER SURGERY  2009 & 2010    right rotator cuff    THORACOTOMY Right 1/2/2024    Procedure: THORACOTOMY;  Surgeon: Tan Thomson MD;  Location: [facility] OR [address] [place] [address];  Service: Cardiothoracic;  Laterality: Right;    THORACOTOMY, WITH INITIAL THERAPEUTIC WEDGE RESECTION OF LUNG Right 1/2/2024    Procedure: THORACOTOMY, WITH INITIAL THERAPEUTIC WEDGE RESECTION OF LUNG;  Surgeon: Tan Thomson MD;  Location: [facility] OR [address] [place] [address];  Service: Cardiothoracic;  Laterality: Right;    TONSILLECTOMY      UPPER GASTROINTESTINAL ENDOSCOPY  04/2018       Review of patient's allergies indicates:   Allergen Reactions    Crestor [rosuvastatin] Swelling    Gluten protein        Family History       Problem Relation (Age of Onset)    Arthritis Father    Breast cancer Mother, Paternal Aunt    Cancer Mother, Paternal Aunt    Diabetes Paternal Grandfather    Hyperlipidemia Sister    Vision loss Father, Mother, Sister          Tobacco Use    Smoking status: Never    Smokeless tobacco: Never   Substance and Sexual Activity    Alcohol use: No    Drug use: Never    Sexual activity: Not Currently     Partners: Male     Birth control/protection: See Surgical Hx     Comment: :       Review of Systems   Constitutional:  Positive for fatigue. Negative for activity change, appetite change and fever.   Respiratory:  Positive for cough, chest tightness and shortness of breath. Negative for wheezing.    Cardiovascular:  Negative for chest pain and leg swelling.   Gastrointestinal:  Negative for abdominal pain, constipation, diarrhea, nausea and vomiting.   Genitourinary: Negative.    Musculoskeletal: Negative.    Skin:  Negative for rash.   Neurological:  Negative for headaches.   Psychiatric/Behavioral: Negative.       Objective:     Vital Signs (Most Recent):  Temp: 99.1 °F (37.3 °C) (02/12/24 0325)  Pulse: 110 (02/12/24 0430)  Resp: (!) 31 (02/12/24 0430)  BP: 118/60 (02/12/24 0430)  SpO2: 95 % (02/12/24  4180) Vital Signs (24h Range):  Temp:  [97.6 °F (36.4 °C)-99.1 °F (37.3 °C)] 99.1 °F (37.3 °C)  Pulse:  [110-132] 110  Resp:  [31-56] 31  SpO2:  [94 %-99 %] 95 %  BP: (118-177)/(60-96) 118/60      There is no height or weight on file to calculate BMI.    No intake or output data in the 24 hours ending 24 0445       Physical Exam  Vitals and nursing note reviewed.   HENT:      Head: Normocephalic and atraumatic.   Eyes:      Conjunctiva/sclera: Conjunctivae normal.      Pupils: Pupils are equal, round, and reactive to light.   Cardiovascular:      Rate and Rhythm: Normal rate and regular rhythm.      Pulses: Normal pulses.   Pulmonary:      Effort: Tachypnea and respiratory distress present.      Breath sounds: Decreased air movement present. No wheezing or rales.   Abdominal:      Palpations: Abdomen is soft.      Tenderness: There is no abdominal tenderness. There is no guarding.   Musculoskeletal:      Right lower le+ Edema present.      Left lower le+ Edema present.   Skin:     General: Skin is warm and dry.   Neurological:      General: No focal deficit present.      Mental Status: She is alert and oriented to person, place, and time.   Psychiatric:         Mood and Affect: Mood normal.            Vents:  Oxygen Concentration (%): 40 (24 0325)  Lines/Drains/Airways       Central Venous Catheter Line  Duration                  Hemodialysis Catheter 23 1501 right internal jugular 382 days              Peripheral Intravenous Line  Duration                  Hemodialysis AV Fistula 23 0800 Left forearm 163 days         Peripheral IV - Single Lumen 24 0207 18 G Anterior;Proximal;Right Forearm <1 day         Peripheral IV - Single Lumen 24 0207 18 G Right;Medial Upper Arm <1 day                  Significant Labs:    CBC/Anemia Profile:  Recent Labs   Lab 24  0115   WBC 11.47   HGB 10.5*   HCT 33.0*      MCV 94   RDW 23.0*        Chemistries:  Recent Labs   Lab  02/12/24  0115   *   K 3.8   CL 93*   CO2 24   BUN 34*   CREATININE 4.0*   CALCIUM 9.5   ALBUMIN 2.5*   PROT 7.2   BILITOT 0.4   ALKPHOS 101   ALT 7*   AST 17       All pertinent labs within the past 24 hours have been reviewed.    Significant Imaging: I have reviewed all pertinent imaging results/findings within the past 24 hours.

## 2024-02-12 NOTE — CONSULTS
MICU evaluated patient. Please see H&P.     David Olivo MD  Internal Medicine, PGY-3  [facility]

## 2024-02-12 NOTE — ED NOTES
Patient still receiving dialysis and noted to suddenly be tachycardic in the 160s. EKG mused to chart indicating afib. MICU team notified and coming to bedside.

## 2024-02-12 NOTE — ASSESSMENT & PLAN NOTE
On Cefpodoxime and Flagyl until planned end date of 02/15/24 per ID recs. Given recent hospitalization and re-admission will start broad spectrum Abx.     - f/u Bcx, de-escalate pending cultures

## 2024-02-12 NOTE — ASSESSMENT & PLAN NOTE
Calcium   Date Value Ref Range Status   02/12/2024 8.9 8.7 - 10.5 mg/dL Final     Phosphorus   Date Value Ref Range Status   02/12/2024 1.6 (L) 2.7 - 4.5 mg/dL Final     PTH, Intact   Date Value Ref Range Status   02/02/2024 111.8 (H) 9.0 - 77.0 pg/mL Final

## 2024-02-12 NOTE — ASSESSMENT & PLAN NOTE
- Recent GIB on last admission with GI recommending 8 week course of Protonix s/p EGD with nonbleeding gastric ulcer. PPI end date 02/23/24 and follow up EGD planned at that time. Continue PPI while inpatient.

## 2024-02-12 NOTE — Clinical Note
Diagnosis: Shortness of breath [786.05.ICD-9-CM]   Future Attending Provider: GABRIEL GALLEGOS [[id number]]   Reason for IP Medical Treatment  (Clinical interventions that can only be accomplished in the IP setting? ) :: Acute hypoxemic respiratory failure requiring bipap   I certify that Inpatient services for greater than or equal to 2 midnights are medically necessary:: Yes   Plans for Post-Acute care--if anticipated (pick the single best option):: C. Discharge home with home health services

## 2024-02-12 NOTE — ED NOTES
Dialysis nurse at bedside states dialysis is at completion. Nurse reports she removed 1.5L of fluid from the patient. MICU provider notified.

## 2024-02-12 NOTE — HPI
73 year old female with a histoyr of ESRD(MW), HTN, HLD, T2DM, IBS, RML carcinoid tumor admitted to [facility] for hypoxic respiratory distress. Initially thought to be from volume overload, however emergent HD done overnight resulted in Afib with RVR. She initially presented with hypoxic resp failure requiring NC satting 70% with 2 liters. MICU was called and bipap was started with improvement in respiratory status.     She was previously admitted to [facility] and underwent a right thoracotomy for newly found RML carcinoid tumor. Course was complicated by right sided chest tube placement and new onset afib with RVR and GIB (EGD with gastric ulcers).     Outpatient ESRD  Hemodialysis  Outpatient nephrologist: Dr. Vyas  Outpatient center: [facility]  Dialysis schedule: [facility]  Last treatment: 2/10/24  Anuric: Minimal  Access: right tunnled dialysis cather; has left fistula, pending maturation

## 2024-02-12 NOTE — ASSESSMENT & PLAN NOTE
Patient presents with acute onset of respiratory failure and was found to have a drop in her EF to 15%.  Presenting rhythm was atrial flutter with 2:1 now she is now sinus tachycardic  Also has elevated troponin that trended up to 2.  Patient's lower extremities are cold and appears mottled which is concerning for shock  SvO2 checked from subclavian line is 56   CO/CI 4.2/2.3    lactic acid 1.8    Plan  Recommend to keep her on metoprolol succinate to 25 mg daily and titrate up  Recommend to start patient on valsartan 40 mg b.i.d. and titrate up as tolerated  Patient will need ischemic workup so please consult intervention cardiology for left heart catheterization once she is stable from respiratory standpoint.        Other:

## 2024-02-12 NOTE — ED PROVIDER NOTES
Encounter Date: 2024       History     Chief Complaint   Patient presents with    Shortness of Breath     Arrives on CPAP     Female with history of right middle lobe carcinoid tumor status post resection brought in by EMS for acutely worsening shortness of breath today.  Since recent discharge from the hospital, patient has been on 2 L home oxygen.  Today, patient became acutely hypoxic and short of breath for approximately 1 hour, with oxygen saturations in the 80s.  EMS placed patient on CPAP en route with improvement in oxygen saturations, the patient was still hypoxic and tachypneic on arrival in ED, so patient placed on BiPAP.  Review of discharge summary from 2024 indicates patient had a thoracotomy and chest tube placement.  Patient was initially anticoagulated, but developed GI bleeding and so anticoagulation was discontinued prematurely.      History right middle lobe carcinoid tumor status post resection, did not finish anticoagulation due to GI bleed  2 L home oxygen regularly  Acutely short of breath 1 hour, satting in the 80s  Put on CPAP and field with improvement brought in  Started on BiPAP here 16/10, tripoding in room  Crackles in lung bases bilaterally  Healing wound over right posterior back where there was a chest tube looks like it could have some infection    The history is provided by the patient, the spouse, the EMS personnel and medical records.     Review of patient's allergies indicates:   Allergen Reactions    Crestor [rosuvastatin] Swelling    Gluten protein      Past Medical History:   Diagnosis Date    Anxiety     Celiac disease 2018    Celiac disease     Depression     Diabetes mellitus     Family history of breast cancer in mother      at age 68    Hyperlipidemia     Hypertension     Meniere disease     Meniere's disease, unspecified ear     Menopause     Peptic ulcer     Reflux esophagitis     Urinary tract infection     Vaginal infection     Vaginal Pap  smear 04/07/2014    Pap/Hpv Negative      Past Surgical History:   Procedure Laterality Date    APPENDECTOMY      BREAST SURGERY      Tags    CARPAL TUNNEL RELEASE Bilateral 09/2017    ,     CLOSURE, COLOSTOMY Left 1/16/2023    Procedure: CLOSURE, COLOSTOMY;  Surgeon: Manuel Javier MD;  Location: [facility] OR;  Service: General;  Laterality: Left;    COLONOSCOPY  04/2018    Normal  ([place])     COLOSTOMY Right 1/16/2023    Procedure: CREATION, COLOSTOMY;  Surgeon: Manuel Javier MD;  Location: [facility] OR;  Service: General;  Laterality: Right;    DILATION AND CURETTAGE OF UTERUS  1986    DUPUYTREN CONTRACTURE RELEASE Bilateral 09/2017    ESOPHAGOGASTRODUODENOSCOPY N/A 1/24/2024    Procedure: EGD (ESOPHAGOGASTRODUODENOSCOPY);  Surgeon: Yamilet Walters MD;  [facility]  Service: Gastroenterology;  Laterality: N/A;    ESOPHAGOGASTRODUODENOSCOPY N/A 1/26/2024    Procedure: EGD (ESOPHAGOGASTRODUODENOSCOPY);  Surgeon: Yamilet Walters MD;  [facility]  Service: Gastroenterology;  Laterality: N/A;    HYSTERECTOMY  1987    BEAU w/ appy, no BSO     IMPLANTATION OF LEADLESS PACEMAKER N/A 1/16/2024    Procedure: INSERTION, CARDIAC PACEMAKER, LEADLESS;  Surgeon: LENIN Frances MD;  Location: [facility] EP LAB;  Service: Cardiology;  Laterality: N/A;  SB, LEADLESS PPM (MICRA), MDT, ANES, EH, CVICU [id number]    INJECTION OF NEUROLYTIC AGENT AROUND LUMBAR SYMPATHETIC NERVE N/A 1/6/2022    Procedure: BLOCK, LUMBAR SYMPATHETIC;  Surgeon: Souleymane Rizo Jr., MD;  Location: [facility] PAIN MGT;  Service: Pain Management;  Laterality: N/A;  no pacemaker   pt is diabetic     INJECTION OF NEUROLYTIC AGENT AROUND LUMBAR SYMPATHETIC NERVE N/A 8/25/2022    Procedure: BLOCK, LUMBAR SYMPATHETIC;  Surgeon: Souleymane Rizo Jr., MD;  Location: [facility] PAIN MGT;  Service: Pain Management;  Laterality: N/A;  diabetic     INSERTION OF TUNNELED CENTRAL VENOUS HEMODIALYSIS CATHETER Right 1/25/2023    Procedure:  INSERTION, CATHETER, HEMODIALYSIS, DUAL LUMEN;  Surgeon: Manuel Javier MD;  Location: [facility] OR;  Service: General;  Laterality: Right;    INSERTION, CATHETER, TUNNELED Left 1/28/2023    Procedure: Insertion,catheter,tunneled;  Surgeon: Watson Fontenot MD;  Location: [facility] OR;  Service: General;  Laterality: Left;    LAPAROTOMY, EXPLORATORY N/A 1/14/2023    Procedure: LAPAROTOMY, EXPLORATORY;  Surgeon: Manuel Javier MD;  Location: [facility] OR;  Service: General;  Laterality: N/A;    LAPAROTOMY, EXPLORATORY N/A 1/16/2023    Procedure: LAPAROTOMY, EXPLORATORY;  Surgeon: Manuel Javier MD;  Location: [facility] OR;  Service: General;  Laterality: N/A;    LYMPHADENECTOMY Right 1/2/2024    Procedure: LYMPHADENECTOMY;  Surgeon: Tan Thomson MD;  [facility];  Service: Cardiothoracic;  Laterality: Right;    PACEMAKER, TEMPORARY, TRANSVENOUS, PEDIATRIC Right 1/12/2024    Procedure: Pacemaker, Temporary, Transvenous;  Surgeon: Todd Carlisle MD;  Location: [facility] CATH LAB;  Service: Cardiology;  Laterality: Right;    REMOVAL OF CATHETER Right 1/28/2023    Procedure: REMOVAL, CATHETER;  Surgeon: Watson Fontenot MD;  Location: [facility] OR;  Service: General;  Laterality: Right;    REMOVAL, TUNNELED CATH Right 1/25/2023    Procedure: REMOVAL, TUNNELED CATH;  Surgeon: Manuel Javier MD;  Location: [facility] OR;  Service: General;  Laterality: Right;    ROBOTIC BRONCHOSCOPY N/A 10/20/2023    Procedure: ROBOTIC BRONCHOSCOPY;  Surgeon: Mayda Monzon MD;  Location: [facility] OR 2ND FLR;  Service: Pulmonary;  Laterality: N/A;    SHOULDER SURGERY  2009 & 2010    right rotator cuff    THORACOTOMY Right 1/2/2024    Procedure: THORACOTOMY;  Surgeon: Tan Thomson MD;  [facility];  Service: Cardiothoracic;  Laterality: Right;    THORACOTOMY, WITH INITIAL THERAPEUTIC WEDGE RESECTION OF LUNG Right 1/2/2024    Procedure: THORACOTOMY, WITH INITIAL THERAPEUTIC WEDGE RESECTION OF LUNG;  Surgeon: Alix  Tan CORNEJO MD;  Location: [facility] OR [address] [place] [address];  Service: Cardiothoracic;  Laterality: Right;    TONSILLECTOMY      UPPER GASTROINTESTINAL ENDOSCOPY  04/2018     Family History   Problem Relation Age of Onset    Vision loss Father     Arthritis Father     Breast cancer Mother     Cancer Mother     Vision loss Mother     Hyperlipidemia Sister     Breast cancer Paternal Aunt     Cancer Paternal Aunt     Diabetes Paternal Grandfather     Vision loss Sister     Kidney disease Neg Hx      Social History     Tobacco Use    Smoking status: Never    Smokeless tobacco: Never   Substance Use Topics    Alcohol use: No    Drug use: Never         Physical Exam     Initial Vitals   BP Pulse Resp Temp SpO2   02/12/24 0100 02/12/24 0100 02/12/24 0100 02/12/24 0128 02/12/24 0100   (!) 136/90 (!) 132 (!) 53 97.6 °F (36.4 °C) 97 %      MAP       --                Physical Exam    Nursing note and vitals reviewed.  Constitutional: She appears ill. Face mask in place.   Cardiovascular:  Normal heart sounds.   Tachycardia present.         Pulmonary/Chest: Accessory muscle usage present. Tachypnea noted. She is in respiratory distress. She has rales in the right lower field and the left lower field.   On BiPAP 16/10  Able to answer questions by nodding yes or no, but unable to speak.   Abdominal: Abdomen is soft. There is no abdominal tenderness.   Musculoskeletal:      Right lower leg: No edema.      Left lower leg: No edema.      Comments: Healing surgical and chest tube wounds over right thoracic back.  Wounds are erythematous     Skin:   Skin mottled         ED Course   Procedures  Labs Reviewed   CBC W/ AUTO DIFFERENTIAL - Abnormal; Notable for the following components:       Result Value    RBC 3.50 (*)     Hemoglobin 10.5 (*)     Hematocrit 33.0 (*)     MCHC 31.8 (*)     RDW 23.0 (*)     Gran % 92.0 (*)     Lymph % 3.0 (*)     All other components within normal limits   COMPREHENSIVE METABOLIC PANEL - Abnormal; Notable for the following  components:    Sodium 133 (*)     Chloride 93 (*)     Glucose 266 (*)     BUN 34 (*)     Creatinine 4.0 (*)     Albumin 2.5 (*)     ALT 7 (*)     eGFR 11.3 (*)     All other components within normal limits   B-TYPE NATRIURETIC PEPTIDE - Abnormal; Notable for the following components:    BNP 2,754 (*)     All other components within normal limits   TROPONIN I - Abnormal; Notable for the following components:    Troponin I 0.312 (*)     All other components within normal limits   CBC W/ AUTO DIFFERENTIAL - Abnormal; Notable for the following components:    RBC 3.56 (*)     Hemoglobin 10.6 (*)     Hematocrit 33.4 (*)     MCHC 31.7 (*)     RDW 22.6 (*)     Immature Granulocytes 0.6 (*)     Gran # (ANC) 10.0 (*)     Immature Grans (Abs) 0.07 (*)     Lymph # 0.6 (*)     Mono # 1.3 (*)     Gran % 83.1 (*)     Lymph % 5.0 (*)     Platelet Estimate Decreased (*)     All other components within normal limits    Narrative:     add on tsh & free t4 per Janeen Haines RN/Meghan Trujillo MD order#   [id number] 02/12/2024 @ 06:25    COMPREHENSIVE METABOLIC PANEL - Abnormal; Notable for the following components:    Sodium 135 (*)     Glucose 178 (*)     Creatinine 2.6 (*)     Albumin 2.5 (*)     ALT 8 (*)     eGFR 18.9 (*)     All other components within normal limits    Narrative:     add on tsh & free t4 per Janeen Haines RN/Meghan Trujillo MD order#   [id number] 02/12/2024 @ 06:25    PHOSPHORUS - Abnormal; Notable for the following components:    Phosphorus 1.6 (*)     All other components within normal limits    Narrative:     add on tsh & free t4 per Janeen Haines RN/Meghan Trujillo MD order#   [id number] 02/12/2024 @ 06:25    TROPONIN I - Abnormal; Notable for the following components:    Troponin I 2.024 (*)     All other components within normal limits    Narrative:     add on tsh & free t4 per Janeen Haines RN/Meghan Trujillo MD order#   [id number] 02/12/2024 @ 06:25    TSH - Abnormal; Notable  for the following components:    TSH 18.183 (*)     All other components within normal limits    Narrative:     add on tsh & free t4 per Janeen Haines RN/Meghan Trujillo MD order#   [id number] 02/12/2024 @ 06:25    ISTAT PROCEDURE - Abnormal; Notable for the following components:    POC PH 7.456 (*)     POC PO2 29 (*)     POC BE 4 (*)     All other components within normal limits   ISTAT PROCEDURE - Abnormal; Notable for the following components:    POC PO2 16 (*)     POC HCO3 30.1 (*)     POC BE 6 (*)     POC TCO2 31 (*)     All other components within normal limits   CULTURE, BLOOD   CULTURE, BLOOD   SARS-COV-2 RNA AMPLIFICATION, QUAL   HEPATITIS B CORE ANTIBODY, IGM    Narrative:     Do not draw if has been done in last 30 days.   HEPATITIS B SURFACE ANTIGEN    Narrative:     Do not draw if has been done in last 30 days.   HEPATITIS PANEL, ACUTE    Narrative:     Do not draw if has been done in last 30 days.   MAGNESIUM    Narrative:     add on tsh & free t4 per Janeen Haines RN/Meghan Trujillo MD order#   [id number] 02/12/2024 @ 06:25    T4, FREE   TSH   T4, FREE    Narrative:     add on tsh & free t4 per Janeen Haines RN/Meghan Trujillo MD order#   [id number] 02/12/2024 @ 06:25    TROPONIN I   SARS-COV-2 RDRP GENE   ISTAT LACTATE   ISTAT CHEM8   POCT GLUCOSE MONITORING CONTINUOUS     EKG Readings: (Independently Interpreted)   Initial Reading: No STEMI. Previous EKG Date: 01/19/2024. Rhythm: Sinus Tachycardia. Axis: Normal.       Imaging Results              CTA Chest Non-Coronary (PE Studies) (Final result)  Result time 02/12/24 07:34:40      Final result by Silver Funes MD (02/12/24 07:34:40)                   Impression:      1. No convincing evidence of acute pulmonary embolism.  2. Bilateral pleural effusions, pericardial effusion, diffuse body wall edema, and suggested pulmonary vascular congestion/interstitial edema.  Correlation with patient volume status recommended.  3.  There remains soft tissue fullness in the lower right paraesophageal soft tissues measures on the order of 24 x 30 mm transaxial dimension, relatively similar to slightly decreased in fullness when compared to the 01/13/2024 chest CT. Persistent inflammatory change related to previous identified as azygoesophageal recess gas containing collection identified on the 01/08/2024 abdomen and pelvis CT to be considered. Attention on follow-up would be recommended.  4. Additional details, as provided in the body of the report.      Electronically signed by: Silver Funes  Date:    02/12/2024  Time:    07:34               Narrative:    EXAMINATION:  CTA CHEST NON CORONARY (PE STUDIES)    CLINICAL HISTORY:  Pulmonary embolism (PE) suspected, unknown D-dimer;    TECHNIQUE:  Low dose axial images, sagittal and coronal reformations were obtained from the thoracic inlet to the lung bases following the IV administration of 75 mL of Omnipaque 350.  Contrast timing was optimized to evaluate the pulmonary arteries.  MIP images were performed.    COMPARISON:  Chest radiograph 02/12/2024.  CT chest 01/13/2024.    FINDINGS:  Exam quality: Satisfactory.    Pulmonary embolism: No acute or chronic pulmonary embolism.    Central pulmonary arteries: Borderline enlargement of the main pulmonary artery to 31 mm transverse dimension, nonspecific finding which may be seen the setting of pulmonary arterial hypertension.    Aorta: Nonenlarged.  Left-sided arch with conventional 3 vessel arch anatomy.  Moderate atherosclerosis.    Heart: Appears at least mildly enlarged.    Coronary arteries: At least mild atherosclerotic calcifications.    Pericardium: Pericardial effusion measuring up to at least 12 mm in thickness, as measured ventral to the right ventricle.    Support tubes and lines: Tunneled dual lumen right internal jugular approach central venous catheter appears to terminate within the right atrium.  Presence of loop recorder device is  suggested.    Base of neck/thyroid: Diminutive caliber thyroid gland as before.  Left brachiocephalic/subclavian/proximal axillary venous stent again identified.  Question SVC stenosis, similar configuration to the 01/13/2024 CT.    Lymph nodes: Relatively similar thoracic adenopathy compared to 01/13/2024.  Reference stable 18 mm short axis subcarinal node (axial series 2, image 197).    Esophagus: Esophagus appears patulous with some degree of retained material, findings which may be seen in the setting of dysmotility the appropriate clinical context.  There remains soft tissue fullness in the lower right paraesophageal soft tissues (for example as seen on axial series 2, image 227), measures on the order of 24 x 30 mm transaxial dimension, relatively similar to slightly decreased in fullness when compared to the 01/13/2024 chest CT.  Persistent inflammatory change related to previous identified as azygoesophageal recess gas containing collection identified on the 01/08/2024 abdomen and pelvis CT to be considered.  Attention on follow-up would be recommended.    Pleura: Moderate to large loculated pleural effusions bilaterally.    Upper abdomen: Unremarkable    Body wall: Suggested at least mild diffuse body wall edema.    Airways: Central airways appear grossly patent.  Nonspecific bronchial wall thickening most pronounced dependently in the lower lobes.    Lungs: Motion compromised assessment.  Atelectasis.  Postop sequela of the right middle lobe, as before.  No new masslike soft tissue is appreciated the current study, allowing for limitations imposed by motion.    Bones: No definite acute abnormality.  Degenerative findings of the spine.                                       X-Ray Chest 1 View (Final result)  Result time 02/12/24 01:56:57      Final result by Wyatt Martinez DO (02/12/24 01:56:57)                   Impression:      Interval removal of the right chest tube.  No pneumothorax.    Stable  pulmonary edema or multifocal pneumonia.    Small bilateral pleural effusions.      Electronically signed by: Wyatt Martinez  Date:    02/12/2024  Time:    01:56               Narrative:    EXAMINATION:  XR CHEST 1 VIEW    CLINICAL HISTORY:  shortness of breath;    TECHNIQUE:  Single frontal view of the chest was performed.    COMPARISON:  01/27/2024.    FINDINGS:  There has been removal of the right-sided chest tube.  There is no pneumothorax.  There is a dual lumen central venous catheter, unchanged in position from prior.  There is a partially loculated small right pleural effusion and a layering small left pleural effusion.  There are stable bilateral patchy airspace opacities and hazy opacities in the bilateral lung bases.  There is no pneumothorax.  The cardiac silhouette is enlarged.  There is a loop recorder device.  There is a left subclavian vascular stent.  There are remote right-sided rib fractures.  Osseous structures otherwise demonstrate degenerative changes.                                       Medications   mupirocin 2 % ointment (has no administration in time range)   0.9%  NaCl infusion ( Intravenous Canceled Entry 2/12/24 0330)   sodium chloride 0.9% bolus 250 mL 250 mL (has no administration in time range)   heparin (porcine) injection 1,000 Units (1,000 Units Intra-Catheter Given 2/12/24 0623)   venlafaxine tablet 37.5 mg (has no administration in time range)   metoprolol tartrate (LOPRESSOR) split tablet 12.5 mg (has no administration in time range)   sodium chloride 0.9% flush 10 mL (has no administration in time range)   heparin (porcine) injection 5,000 Units (5,000 Units Subcutaneous Given 2/12/24 0571)   acetaminophen tablet 650 mg (has no administration in time range)   melatonin tablet 6 mg (has no administration in time range)   atorvastatin tablet 40 mg (40 mg Oral Given 2/12/24 0617)   droNABinol capsule 5 mg (has no administration in time range)   ferrous gluconate tablet 324 mg  (has no administration in time range)   pantoprazole EC tablet 40 mg (40 mg Oral Given 2/12/24 0618)   pregabalin capsule 75 mg (has no administration in time range)   sevelamer carbonate tablet 1,600 mg (has no administration in time range)   glucose chewable tablet 16 g (has no administration in time range)   glucose chewable tablet 24 g (has no administration in time range)   glucagon (human recombinant) injection 1 mg (has no administration in time range)   dextrose 10% bolus 125 mL 125 mL (has no administration in time range)   dextrose 10% bolus 250 mL 250 mL (has no administration in time range)   vancomycin - pharmacy to dose (has no administration in time range)   piperacillin-tazobactam (ZOSYN) 4.5 g in dextrose 5 % in water (D5W) 100 mL IVPB (MB+) (4.5 g Intravenous New Bag 2/12/24 0528)   insulin aspart U-100 pen 0-5 Units (has no administration in time range)   furosemide injection 80 mg (80 mg Intravenous Given 2/12/24 0215)   vancomycin (VANCOCIN) 1,000 mg in dextrose 5 % (D5W) 250 mL IVPB (Vial-Mate) (0 mg Intravenous Stopped 2/12/24 0731)   metoprolol injection 5 mg (5 mg Intravenous Given 2/12/24 0545)   metoprolol injection 5 mg (5 mg Intravenous Given 2/12/24 0616)   iohexoL (OMNIPAQUE 350) injection 75 mL (75 mLs Intravenous Given 2/12/24 0644)     Medical Decision Making  73-year-old female with past medical history of right middle lobe resection presents for evaluation of acutely worsening shortness of breath occurring today.    Pathologies I considered my differential diagnosis for this patient include, but are not limited to, pulmonary embolism, ACS, pneumothorax, dissection, flash pulmonary edema, CHF, pneumonia, pleural effusion.    On arrival in emergency department, patient in respiratory distress:  Tachypneic with a breathing rate in the 60s, tripod stance, accessory muscle usage.  After placement on BiPAP, dialysis, and administration of diuretics, patient's respiratory rate  significantly improved to the 30s.  Patient initially in respiratory alkalosis which resolved with improvement in respiratory rate.  Bedside ultrasound showing bilateral pleural effusions.    Initial troponin elevated.  Although patient's EGFR less than 20, significant concern for pulmonary embolism in this patient.  Discussed patient with Nephrology, who emergently dialyzed patient.  Patient received CT PE study which did not show pulmonary embolism.    While in emergency department, combination of dialysis, diuresis, and BiPAP significantly lower patient's blood pressure, and she went into  atrial fibrillation with RVR, which was rate controlled with metoprolol.    Given the significant respiratory distress at this patient, contacted critical care team for evaluation of ICU placement.  Critical care team admitted patient to the ICU for further workup.    Amount and/or Complexity of Data Reviewed  Labs: ordered. Decision-making details documented in ED Course.  Radiology: ordered and independent interpretation performed.     Details: Independent interpretation of this chest x-ray:  Bilateral opacities concerning for fusions.  ECG/medicine tests: ordered and independent interpretation performed. Decision-making details documented in ED Course.  Discussion of management or test interpretation with external provider(s): Discussed patient with Nephrology, who emergently dialyzed patient.  Discussed patient with critical care team, who admitted patient to ICU due to respiratory distress on BiPAP.    Risk  Prescription drug management.  Decision regarding hospitalization.  Risk Details: Patient received furosemide and broad-spectrum antibiotics while in ED. Admitted patient to ICU for respiratory distress on BiPAP.                 ED Course as of 02/12/24 0750   Mon Feb 12, 2024   0153 ISTAT PROCEDURE(!!)  Alkalotic []   0153 CBC Auto Differential(!)  Hemoglobin decreased, up from 6 days ago []   0154 Comprehensive  Metabolic Panel(!)  Sodium decreased, glucose elevated, BUN elevated, creatinine elevated, albumin down []   0154 Troponin I(!)  Troponin elevated, increased from 1 month ago []   0203 Chest x-ray shows interval removal of chest tube as well as bilateral pleural effusions, right greater than left.  Loculation noted on the right. [DS]   0210 ISTAT Lactate  Lactate within normal limit []   0216 COVID-19 Rapid Screening  COVID negative []      ED Course User Index  [] Darrell Dang MD  [DS] Guillaume Bell MD                           Clinical Impression:  Final diagnoses:  [R06.02] Shortness of breath          ED Disposition Condition    Admit                 Darrell Dang MD  Resident  02/12/24 [id number]

## 2024-02-13 PROBLEM — E44.0 MODERATE MALNUTRITION: Status: ACTIVE | Noted: 2024-02-13

## 2024-02-13 LAB
ALBUMIN SERPL BCP-MCNC: 2.1 G/DL (ref 3.5–5.2)
ALLENS TEST: ABNORMAL
ALP SERPL-CCNC: 93 U/L (ref 55–135)
ALT SERPL W/O P-5'-P-CCNC: 10 U/L (ref 10–44)
ANION GAP SERPL CALC-SCNC: 10 MMOL/L (ref 8–16)
ANION GAP SERPL CALC-SCNC: 11 MMOL/L (ref 8–16)
ANION GAP SERPL CALC-SCNC: 11 MMOL/L (ref 8–16)
ANION GAP SERPL CALC-SCNC: 9 MMOL/L (ref 8–16)
AST SERPL-CCNC: 29 U/L (ref 10–40)
BASOPHILS # BLD AUTO: 0.01 K/UL (ref 0–0.2)
BASOPHILS NFR BLD: 0.1 % (ref 0–1.9)
BILIRUB SERPL-MCNC: 0.3 MG/DL (ref 0.1–1)
BUN SERPL-MCNC: 15 MG/DL (ref 8–23)
BUN SERPL-MCNC: 9 MG/DL (ref 8–23)
CALCIUM SERPL-MCNC: 7.8 MG/DL (ref 8.7–10.5)
CALCIUM SERPL-MCNC: 8 MG/DL (ref 8.7–10.5)
CALCIUM SERPL-MCNC: 8.1 MG/DL (ref 8.7–10.5)
CALCIUM SERPL-MCNC: 8.1 MG/DL (ref 8.7–10.5)
CHLORIDE SERPL-SCNC: 97 MMOL/L (ref 95–110)
CHLORIDE SERPL-SCNC: 97 MMOL/L (ref 95–110)
CHLORIDE SERPL-SCNC: 98 MMOL/L (ref 95–110)
CHLORIDE SERPL-SCNC: 98 MMOL/L (ref 95–110)
CO2 SERPL-SCNC: 22 MMOL/L (ref 23–29)
CO2 SERPL-SCNC: 23 MMOL/L (ref 23–29)
CREAT SERPL-MCNC: 1.1 MG/DL (ref 0.5–1.4)
CREAT SERPL-MCNC: 1.5 MG/DL (ref 0.5–1.4)
DELSYS: ABNORMAL
DIFFERENTIAL METHOD BLD: ABNORMAL
EOSINOPHIL # BLD AUTO: 0 K/UL (ref 0–0.5)
EOSINOPHIL NFR BLD: 0 % (ref 0–8)
ERYTHROCYTE [DISTWIDTH] IN BLOOD BY AUTOMATED COUNT: 22.6 % (ref 11.5–14.5)
EST. GFR  (NO RACE VARIABLE): 36.6 ML/MIN/1.73 M^2
EST. GFR  (NO RACE VARIABLE): 53.1 ML/MIN/1.73 M^2
FLOW: 5
GLUCOSE SERPL-MCNC: 172 MG/DL (ref 70–110)
GLUCOSE SERPL-MCNC: 172 MG/DL (ref 70–110)
GLUCOSE SERPL-MCNC: 221 MG/DL (ref 70–110)
GLUCOSE SERPL-MCNC: 261 MG/DL (ref 70–110)
HCO3 UR-SCNC: 22.8 MMOL/L (ref 24–28)
HCT VFR BLD AUTO: 29.6 % (ref 37–48.5)
HGB BLD-MCNC: 9.2 G/DL (ref 12–16)
IMM GRANULOCYTES # BLD AUTO: 0.07 K/UL (ref 0–0.04)
IMM GRANULOCYTES NFR BLD AUTO: 0.6 % (ref 0–0.5)
LYMPHOCYTES # BLD AUTO: 0.9 K/UL (ref 1–4.8)
LYMPHOCYTES NFR BLD: 8.4 % (ref 18–48)
MAGNESIUM SERPL-MCNC: 1.8 MG/DL (ref 1.6–2.6)
MAGNESIUM SERPL-MCNC: 2.1 MG/DL (ref 1.6–2.6)
MCH RBC QN AUTO: 29.9 PG (ref 27–31)
MCHC RBC AUTO-ENTMCNC: 31.1 G/DL (ref 32–36)
MCV RBC AUTO: 96 FL (ref 82–98)
MODE: ABNORMAL
MONOCYTES # BLD AUTO: 1.2 K/UL (ref 0.3–1)
MONOCYTES NFR BLD: 10.8 % (ref 4–15)
NEUTROPHILS # BLD AUTO: 8.9 K/UL (ref 1.8–7.7)
NEUTROPHILS NFR BLD: 80.1 % (ref 38–73)
NRBC BLD-RTO: 0 /100 WBC
PCO2 BLDA: 36.8 MMHG (ref 35–45)
PH SMN: 7.4 [PH] (ref 7.35–7.45)
PHOSPHATE SERPL-MCNC: 2.4 MG/DL (ref 2.7–4.5)
PHOSPHATE SERPL-MCNC: 3.7 MG/DL (ref 2.7–4.5)
PHOSPHATE SERPL-MCNC: 3.7 MG/DL (ref 2.7–4.5)
PHOSPHATE SERPL-MCNC: 4.4 MG/DL (ref 2.7–4.5)
PLATELET # BLD AUTO: 178 K/UL (ref 150–450)
PMV BLD AUTO: 10.8 FL (ref 9.2–12.9)
PO2 BLDA: 35 MMHG (ref 40–60)
POC BE: -2 MMOL/L
POC SATURATED O2: 68 % (ref 95–100)
POC TCO2: 24 MMOL/L (ref 24–29)
POCT GLUCOSE: 231 MG/DL (ref 70–110)
POCT GLUCOSE: 232 MG/DL (ref 70–110)
POCT GLUCOSE: 268 MG/DL (ref 70–110)
POCT GLUCOSE: 269 MG/DL (ref 70–110)
POTASSIUM SERPL-SCNC: 3.9 MMOL/L (ref 3.5–5.1)
POTASSIUM SERPL-SCNC: 4.3 MMOL/L (ref 3.5–5.1)
PROT SERPL-MCNC: 6.4 G/DL (ref 6–8.4)
RBC # BLD AUTO: 3.08 M/UL (ref 4–5.4)
SAMPLE: ABNORMAL
SITE: ABNORMAL
SODIUM SERPL-SCNC: 130 MMOL/L (ref 136–145)
SODIUM SERPL-SCNC: 130 MMOL/L (ref 136–145)
SODIUM SERPL-SCNC: 131 MMOL/L (ref 136–145)
SODIUM SERPL-SCNC: 131 MMOL/L (ref 136–145)
SP02: 98
VANCOMYCIN SERPL-MCNC: 7.1 UG/ML
WBC # BLD AUTO: 11.06 K/UL (ref 3.9–12.7)

## 2024-02-13 PROCEDURE — 83735 ASSAY OF MAGNESIUM: CPT | Mod: 91 | Performed by: STUDENT IN AN ORGANIZED HEALTH CARE EDUCATION/TRAINING PROGRAM

## 2024-02-13 PROCEDURE — 25000003 PHARM REV CODE 250

## 2024-02-13 PROCEDURE — 27100171 HC OXYGEN HIGH FLOW UP TO 24 HOURS

## 2024-02-13 PROCEDURE — 63600175 PHARM REV CODE 636 W HCPCS

## 2024-02-13 PROCEDURE — 25000003 PHARM REV CODE 250: Performed by: INTERNAL MEDICINE

## 2024-02-13 PROCEDURE — 80053 COMPREHEN METABOLIC PANEL: CPT | Performed by: INTERNAL MEDICINE

## 2024-02-13 PROCEDURE — 90945 DIALYSIS ONE EVALUATION: CPT

## 2024-02-13 PROCEDURE — 5A1D80Z PERFORMANCE OF URINARY FILTRATION, PROLONGED INTERMITTENT, 6-18 HOURS PER DAY: ICD-10-PCS | Performed by: INTERNAL MEDICINE

## 2024-02-13 PROCEDURE — 84100 ASSAY OF PHOSPHORUS: CPT | Performed by: INTERNAL MEDICINE

## 2024-02-13 PROCEDURE — 94761 N-INVAS EAR/PLS OXIMETRY MLT: CPT | Mod: XB

## 2024-02-13 PROCEDURE — 63600175 PHARM REV CODE 636 W HCPCS: Performed by: INTERNAL MEDICINE

## 2024-02-13 PROCEDURE — 80069 RENAL FUNCTION PANEL: CPT | Performed by: STUDENT IN AN ORGANIZED HEALTH CARE EDUCATION/TRAINING PROGRAM

## 2024-02-13 PROCEDURE — 25000003 PHARM REV CODE 250: Performed by: STUDENT IN AN ORGANIZED HEALTH CARE EDUCATION/TRAINING PROGRAM

## 2024-02-13 PROCEDURE — 27000221 HC OXYGEN, UP TO 24 HOURS

## 2024-02-13 PROCEDURE — 20000000 HC ICU ROOM

## 2024-02-13 PROCEDURE — 83735 ASSAY OF MAGNESIUM: CPT | Performed by: INTERNAL MEDICINE

## 2024-02-13 PROCEDURE — 99233 SBSQ HOSP IP/OBS HIGH 50: CPT | Mod: ,,, | Performed by: INTERNAL MEDICINE

## 2024-02-13 PROCEDURE — 80202 ASSAY OF VANCOMYCIN: CPT | Performed by: INTERNAL MEDICINE

## 2024-02-13 PROCEDURE — 82803 BLOOD GASES ANY COMBINATION: CPT

## 2024-02-13 PROCEDURE — 99232 SBSQ HOSP IP/OBS MODERATE 35: CPT | Mod: ,,, | Performed by: INTERNAL MEDICINE

## 2024-02-13 PROCEDURE — 85025 COMPLETE CBC W/AUTO DIFF WBC: CPT | Performed by: INTERNAL MEDICINE

## 2024-02-13 PROCEDURE — 63600175 PHARM REV CODE 636 W HCPCS: Performed by: STUDENT IN AN ORGANIZED HEALTH CARE EDUCATION/TRAINING PROGRAM

## 2024-02-13 PROCEDURE — 99900035 HC TECH TIME PER 15 MIN (STAT)

## 2024-02-13 RX ORDER — MAGNESIUM SULFATE HEPTAHYDRATE 40 MG/ML
2 INJECTION, SOLUTION INTRAVENOUS
Status: DISPENSED | OUTPATIENT
Start: 2024-02-13 | End: 2024-02-14

## 2024-02-13 RX ORDER — HYDROCODONE BITARTRATE AND ACETAMINOPHEN 500; 5 MG/1; MG/1
TABLET ORAL CONTINUOUS
Status: ACTIVE | OUTPATIENT
Start: 2024-02-13 | End: 2024-02-14

## 2024-02-13 RX ADMIN — POTASSIUM & SODIUM PHOSPHATES POWDER PACK 280-160-250 MG 2 PACKET: 280-160-250 PACK at 05:02

## 2024-02-13 RX ADMIN — SEVELAMER CARBONATE 1600 MG: 800 TABLET, FILM COATED ORAL at 11:02

## 2024-02-13 RX ADMIN — AMIODARONE HYDROCHLORIDE 0.5 MG/MIN: 1.8 INJECTION, SOLUTION INTRAVENOUS at 05:02

## 2024-02-13 RX ADMIN — INSULIN ASPART 2 UNITS: 100 INJECTION, SOLUTION INTRAVENOUS; SUBCUTANEOUS at 08:02

## 2024-02-13 RX ADMIN — VENLAFAXINE 37.5 MG: 37.5 TABLET ORAL at 08:02

## 2024-02-13 RX ADMIN — SODIUM PHOSPHATE, MONOBASIC, MONOHYDRATE AND SODIUM PHOSPHATE, DIBASIC, ANHYDROUS 39.99 MMOL: 142; 276 INJECTION, SOLUTION INTRAVENOUS at 01:02

## 2024-02-13 RX ADMIN — Medication 324 MG: at 08:02

## 2024-02-13 RX ADMIN — METOPROLOL TARTRATE 12.5 MG: 25 TABLET, FILM COATED ORAL at 08:02

## 2024-02-13 RX ADMIN — DRONABINOL 5 MG: 2.5 CAPSULE ORAL at 05:02

## 2024-02-13 RX ADMIN — VANCOMYCIN HYDROCHLORIDE 1000 MG: 1 INJECTION, POWDER, LYOPHILIZED, FOR SOLUTION INTRAVENOUS at 01:02

## 2024-02-13 RX ADMIN — SODIUM CHLORIDE: 9 INJECTION, SOLUTION INTRAVENOUS at 06:02

## 2024-02-13 RX ADMIN — MAGNESIUM SULFATE HEPTAHYDRATE 2 G: 40 INJECTION, SOLUTION INTRAVENOUS at 04:02

## 2024-02-13 RX ADMIN — INSULIN ASPART 3 UNITS: 100 INJECTION, SOLUTION INTRAVENOUS; SUBCUTANEOUS at 11:02

## 2024-02-13 RX ADMIN — DRONABINOL 5 MG: 2.5 CAPSULE ORAL at 04:02

## 2024-02-13 RX ADMIN — ATORVASTATIN CALCIUM 40 MG: 40 TABLET, FILM COATED ORAL at 09:02

## 2024-02-13 RX ADMIN — PIPERACILLIN SODIUM AND TAZOBACTAM SODIUM 4.5 G: 4; .5 INJECTION, POWDER, FOR SOLUTION INTRAVENOUS at 05:02

## 2024-02-13 RX ADMIN — INSULIN ASPART 1 UNITS: 100 INJECTION, SOLUTION INTRAVENOUS; SUBCUTANEOUS at 10:02

## 2024-02-13 RX ADMIN — HEPARIN SODIUM 5000 UNITS: 5000 INJECTION INTRAVENOUS; SUBCUTANEOUS at 05:02

## 2024-02-13 RX ADMIN — AMIODARONE HYDROCHLORIDE 0.5 MG/MIN: 1.8 INJECTION, SOLUTION INTRAVENOUS at 09:02

## 2024-02-13 RX ADMIN — AMIODARONE HYDROCHLORIDE 150 MG: 1.5 INJECTION, SOLUTION INTRAVENOUS at 07:02

## 2024-02-13 RX ADMIN — MUPIROCIN: 20 OINTMENT TOPICAL at 08:02

## 2024-02-13 RX ADMIN — HEPARIN SODIUM 5000 UNITS: 5000 INJECTION INTRAVENOUS; SUBCUTANEOUS at 09:02

## 2024-02-13 RX ADMIN — MUPIROCIN: 20 OINTMENT TOPICAL at 09:02

## 2024-02-13 RX ADMIN — ACETAMINOPHEN 650 MG: 325 TABLET ORAL at 09:02

## 2024-02-13 RX ADMIN — SEVELAMER CARBONATE 1600 MG: 800 TABLET, FILM COATED ORAL at 04:02

## 2024-02-13 RX ADMIN — AMIODARONE HYDROCHLORIDE 1 MG/MIN: 1.8 INJECTION, SOLUTION INTRAVENOUS at 07:02

## 2024-02-13 RX ADMIN — METOPROLOL TARTRATE 12.5 MG: 25 TABLET, FILM COATED ORAL at 09:02

## 2024-02-13 RX ADMIN — PANTOPRAZOLE SODIUM 40 MG: 40 TABLET, DELAYED RELEASE ORAL at 05:02

## 2024-02-13 RX ADMIN — HEPARIN SODIUM 5000 UNITS: 5000 INJECTION INTRAVENOUS; SUBCUTANEOUS at 01:02

## 2024-02-13 RX ADMIN — PREGABALIN 75 MG: 75 CAPSULE ORAL at 04:02

## 2024-02-13 RX ADMIN — PANTOPRAZOLE SODIUM 40 MG: 40 TABLET, DELAYED RELEASE ORAL at 04:02

## 2024-02-13 RX ADMIN — INSULIN ASPART 3 UNITS: 100 INJECTION, SOLUTION INTRAVENOUS; SUBCUTANEOUS at 05:02

## 2024-02-13 NOTE — PROGRESS NOTES
02/12/24 1830   Treatment   Treatment Type SLED   Treatment Status New start   Dialysis Machine Number [id number]   Solutions Labeled and Current  Yes   Access Tunneled Cath;Right;IJ   Catheter Dressing Intact  Yes   Alarms Engaged Yes   CRRT Comments 10 hours Sled tx initiated.     Report given to primary nurse.

## 2024-02-13 NOTE — NURSING
SICU PLAN OF CARE NOTE    Dx: Acute hypoxemic respiratory failure    Goals of Care:   MAP >65    Vital Signs (last 12 hours):   Temp:  [98 °F (36.7 °C)-98.5 °F (36.9 °C)]   Pulse:  []   Resp:  [18-50]   BP: ()/(58-89)   SpO2:  [88 %-100 %]      Neuro: AAO x4, Follows Commands, and Moves All Extremities    Cardiac: tachysinus    Respiratory: Nasal Cannula    Gtts: Amiodarone    Urine Output: Anuric 0 cc/shift    Diet: Dental Soft     Labs/Accuchecks: ACHS.    Skin:  All skin remains free from injury.  Patient turned Q2, mattress inflated, and bed working correctly.    Shift Events: 10 hrs of SLED completed, pt tolerated well. Pt went into AFIB RVR, w/ HR in the 140's, Blood pressure maintained in the 120's. Amiodarone bolus and gtt started per MD. See flowsheet for further assessment/details.  Family updated on current condition/plan of care, questions answered, and emotional support provided.  MD updated on current condition, vitals, labs, and gtts.  No new orders received, will continue to monitor.

## 2024-02-13 NOTE — PROGRESS NOTES
[facility]  Nephrology  Progress Note    Patient Name: Lily Green  MRN: [id number]  Admission Date: 2/12/2024  Hospital Length of Stay: 1 days  Attending Provider: Meghan Trujillo MD   Primary Care Physician: Pavel Calixto MD  Principal Problem:Acute hypoxemic respiratory failure    Subjective:     HPI: 73 year old female with a histoyr of ESRD(MW), HTN, HLD, T2DM, IBS, RML carcinoid tumor admitted to [facility] for hypoxic respiratory distress. Initially thought to be from volume overload, however emergent HD done overnight resulted in Afib with RVR. She initially presented with hypoxic resp failure requiring NC satting 70% with 2 liters. MICU was called and bipap was started with improvement in respiratory status.     She was previously admitted to [facility] and underwent a right thoracotomy for newly found RML carcinoid tumor. Course was complicated by right sided chest tube placement and new onset afib with RVR and GIB (EGD with gastric ulcers).     Outpatient ESRD  Hemodialysis  Outpatient nephrologist: Dr. Vyas  Outpatient center: [facility]  Dialysis schedule: [facility]  Last treatment: 2/10/24  Anuric: Minimal  Access: right tunnled dialysis cather; has left fistula, pending maturation      Interval History: Patient seen and examined this AM. No acute events overnight. Afebrile with pulse ranging from 130-80s bpm. Systolic blood pressures ranging from 150-80s mmHg via cuff pressures. She is saturating +96% on 5 liters via nasal cannula. She is net positive ~350 mL despite 1.5 liters removed yesterday with RRT with iHD initially and then SLED x10 hours overnight. CVP 10 mmHg this morning. Started on amiodarone for atrial fibrillation with RVR.    Review of patient's allergies indicates:   Allergen Reactions    Crestor [rosuvastatin] Swelling    Gluten protein      Current Facility-Administered Medications   Medication Frequency    0.9%  NaCl infusion (CRRT USE ONLY) Continuous     0.9%  NaCl infusion Once    0.9%  NaCl infusion Continuous    acetaminophen tablet 650 mg Q4H PRN    albuterol-ipratropium 2.5 mg-0.5 mg/3 mL nebulizer solution 3 mL Q4H PRN    amiodarone 360 mg/200 mL (1.8 mg/mL) infusion Continuous    atorvastatin tablet 40 mg QHS    dextrose 10% bolus 125 mL 125 mL PRN    dextrose 10% bolus 250 mL 250 mL PRN    droNABinol capsule 5 mg BID AC    ferrous gluconate tablet 324 mg Daily with breakfast    glucagon (human recombinant) injection 1 mg PRN    glucose chewable tablet 16 g PRN    glucose chewable tablet 24 g PRN    heparin (porcine) injection 5,000 Units Q8H    insulin aspart U-100 pen 0-5 Units QID (AC + HS) PRN    magnesium sulfate 2g in water 50mL IVPB (premix) PRN    melatonin tablet 6 mg Nightly PRN    metoprolol tartrate (LOPRESSOR) split tablet 12.5 mg BID    mupirocin 2 % ointment BID    ondansetron injection 4 mg Q6H PRN    pantoprazole EC tablet 40 mg BID AC    piperacillin-tazobactam (ZOSYN) 4.5 g in dextrose 5 % in water (D5W) 100 mL IVPB (MB+) Q12H    pregabalin capsule 75 mg Daily    sevelamer carbonate tablet 1,600 mg TID WM    sodium chloride 0.9% bolus 250 mL 250 mL PRN    sodium chloride 0.9% flush 10 mL PRN    sodium phosphate 20.01 mmol in dextrose 5 % (D5W) 250 mL IVPB PRN    sodium phosphate 30 mmol in dextrose 5 % (D5W) 250 mL IVPB PRN    sodium phosphate 39.99 mmol in dextrose 5 % (D5W) 250 mL IVPB PRN    vancomycin - pharmacy to dose pharmacy to manage frequency    venlafaxine tablet 37.5 mg Daily       Objective:     Vital Signs (Most Recent):  Temp: 97.7 °F (36.5 °C) (02/13/24 0730)  Pulse: (!) 130 (02/13/24 0845)  Resp: (!) 28 (02/13/24 0845)  BP: 99/73 (02/13/24 0845)  SpO2: 98 % (02/13/24 0845) Vital Signs (24h Range):  Temp:  [97.7 °F (36.5 °C)-98.9 °F (37.2 °C)] 97.7 °F (36.5 °C)  Pulse:  [] 130  Resp:  [17-50] 28  SpO2:  [88 %-100 %] 98 %  BP: ()/(52-98) 99/73     Weight: 67.6 kg (149 lb 0.5 oz) (02/12/24 0830)  Body mass index is  24.8 kg/m².  Body surface area is 1.76 meters squared.    I/O last 3 completed shifts:  In: 3696.2 [I.V.:2339.1; Other:700; IV Piggyback:657.1]  Out: 4843 [Other:4843]     Physical Exam  Constitutional:       General: She is not in acute distress.     Appearance: She is not ill-appearing or toxic-appearing.   HENT:      Head: Normocephalic and atraumatic.      Nose: Nose normal.   Cardiovascular:      Comments: Right chest tunneled dialysis catheter  Pulmonary:      Breath sounds: Rhonchi present.   Abdominal:      General: Abdomen is flat. There is no distension.      Tenderness: There is no abdominal tenderness.   Musculoskeletal:      Right lower leg: No edema.      Left lower leg: No edema.      Comments: Left upper AVG, good thrill and hum   Skin:     General: Skin is warm.      Coloration: Skin is not jaundiced.      Findings: No bruising.   Neurological:      Mental Status: She is alert.          Significant Labs:  ABGs:   Recent Labs   Lab 02/12/24  0552 02/12/24  1703   PH 7.443  --    PCO2 44.1  --    HCO3 30.1*  --    POCSATURATED 23 56   BE 6*  --      BMP:   Recent Labs   Lab 02/13/24  0334   *  172*   *  131*   K 4.3  4.3   CL 97  97   CO2 23  23   BUN 9  9   CREATININE 1.1  1.1   CALCIUM 8.1*  8.1*   MG 2.1  2.1     CBC:   Recent Labs   Lab 02/13/24  0334   WBC 11.06   RBC 3.08*   HGB 9.2*   HCT 29.6*      MCV 96   MCH 29.9   MCHC 31.1*     CMP:   Recent Labs   Lab 02/13/24  0334   *  172*   CALCIUM 8.1*  8.1*   ALBUMIN 2.1*  2.1*   PROT 6.4   *  131*   K 4.3  4.3   CO2 23  23   CL 97  97   BUN 9  9   CREATININE 1.1  1.1   ALKPHOS 93   ALT 10   AST 29   BILITOT 0.3     LFTs:   Recent Labs   Lab 02/13/24  0334   ALT 10   AST 29   ALKPHOS 93   BILITOT 0.3   PROT 6.4   ALBUMIN 2.1*  2.1*     Microbiology Results (last 7 days)       Procedure Component Value Units Date/Time    Blood culture [[id number]] Collected: 02/12/24 0526    Order Status: Completed  Specimen: Blood from Peripheral, Upper Arm, Right Updated: 02/13/24 0812     Blood Culture, Routine No Growth to date      No Growth to date    Blood culture [[id number]] Collected: 02/12/24 0526    Order Status: Completed Specimen: Blood from Peripheral, Antecubital, Right Updated: 02/13/24 0812     Blood Culture, Routine No Growth to date      No Growth to date          Specimen (24h ago, onward)      None          TSH:   Recent Labs   Lab 02/12/24  0556   TSH 18.183*     Significant Imaging:  I have reviewed all imagining in the last 24 hours.  Assessment/Plan:     Renal/  ESRD (end stage renal disease)  ESRD   Hypoxic resp failure - improved with 1.5 liter removal;   ECHO 2/12 with newly depressed EF of 10-15% from 60-65% one month prior    Plan/Recommendation  -Plan for nocturnal SLED tonight again tonight for 10 hours with UF as tolerated, lower UF before adding vasopressor support  -Keep MAP > 65  -Keep hemoglobin > 7  -Strict ins and outs  -Avoid nephrotoxic agents if possible and renally dose medications  -Avoid drastic hemodynamic changes if possible        Oncology  Anemia in ESRD (end-stage renal disease)  Goal hemoglobin in ESRD is 10-12  Hemoglobin   Date Value Ref Range Status   02/13/2024 9.2 (L) 12.0 - 16.0 g/dL Final     Iron   Date Value Ref Range Status   02/02/2024 52 30 - 160 ug/dL Final     Transferrin   Date Value Ref Range Status   02/02/2024 121 (L) 200 - 375 mg/dL Final     TIBC   Date Value Ref Range Status   02/02/2024 179 (L) 250 - 450 ug/dL Final     Saturated Iron   Date Value Ref Range Status   02/02/2024 29 20 - 50 % Final     Ferritin   Date Value Ref Range Status   02/02/2024 1,803 (H) 20.0 - 300.0 ng/mL Final       Endocrine  Type 2 diabetes mellitus with diabetic polyneuropathy, without long-term current use of insulin  - management per primary team        Thank you for your consult. I will follow-up with patient. Please contact us if you have any additional  questions.    Ernesto Ayala MD  Nephrology  [facility]

## 2024-02-13 NOTE — CONSULTS
[facility] - Surgical Intensive Care  Infectious Disease  Consult Note    Patient Name: Lily Green  MRN: [id number]  Admission Date: 2/12/2024  Hospital Length of Stay: 1 days  Attending Physician: Meghan Trujillo MD  Primary Care Provider: Pavel Calixto MD     Isolation Status: No active isolations    Patient information was obtained from patient and ER records.      Inpatient consult to Infectious Diseases  Consult performed by: Shayan Christensen MD  Consult ordered by: Kaylyn Jacobo DNP        Assessment/Plan:     Pulmonary  * Acute hypoxemic respiratory failure  73 year old female with a history right middle lobe carcinoid tumor s/p resection in January 2024 with her post surgical course that was complicated by pleural effusions and lung infection with Klebsiella and E coli.  She was on meropenem, then transitioned to ceftriaxone and metronidazole.  She was ultimately discharged on cefpodoxime and metronidazole with a plan to complete 4 weeks of antibiotics on February 15.  She is now re-admitted with respiratory distress.  ID consulted to assist with her care.    CT scan reviewed.  Per radiology read, there is a small amount of fluid and gas with associated inflammatory changes in the azygoesophageal recess.  Recommend asking CT surgery to review and rule out a small esophageal leak or fistula.  Okay to continue zosyn and vancomycin.         Thank you for your consult. I will follow-up with patient. Please contact us if you have any additional questions.    Shayan Christensen MD  Infectious Disease  [facility] - [facility]    Subjective:     Principal Problem: Acute hypoxemic respiratory failure    HPI: 73 year old female with a history of DM2, hyperlipidemia, and right middle lobe carcinoid tumor s/p open thoracotomy with wedge resection to right middle lobe and lymph node resection on January 2, 2024.  Her post surgical course was complicated by bilateral pleural effusions  and respiratory cultures that were positive for  Klebsiella pneumoniae and E coli.  She was on meropenem initially but was later transitioned to ceftriaxone and metronidazole.  She also underwent leadless pacemaker placement on 2024 during that hospital stay.  After around 35 days in the hospital, she was discharged from the hospital on cefpodoxime and metronidazole around .  She is re-admitted with acute shortness of breath.  Repeat chest imaging shows trace bilateral pleural effusions.  CT also shows some small amount of fluid and gas with inflammatory change in the azygoesphageal recess.  ID is consulted to evaluate the patient.    Past Medical History:   Diagnosis Date    Anxiety     Celiac disease 2018    Celiac disease     Depression     Diabetes mellitus     Family history of breast cancer in mother      at age 68    Hyperlipidemia     Hypertension     Meniere disease     Meniere's disease, unspecified ear     Menopause     Peptic ulcer     Reflux esophagitis     Urinary tract infection     Vaginal infection     Vaginal Pap smear 2014    Pap/Hpv Negative        Past Surgical History:   Procedure Laterality Date    APPENDECTOMY      BREAST SURGERY      Tags    CARPAL TUNNEL RELEASE Bilateral 2017    ,     CLOSURE, COLOSTOMY Left 2023    Procedure: CLOSURE, COLOSTOMY;  Surgeon: Manuel Javier MD;  Location: [facility];  Service: General;  Laterality: Left;    COLONOSCOPY  2018    Normal  ( Juan A)     COLOSTOMY Right 2023    Procedure: CREATION, COLOSTOMY;  Surgeon: Manuel Javier MD;  Location: [facility];  Service: General;  Laterality: Right;    DILATION AND CURETTAGE OF UTERUS  1986    DUPUYTREN CONTRACTURE RELEASE Bilateral 2017    ESOPHAGOGASTRODUODENOSCOPY N/A 2024    Procedure: EGD (ESOPHAGOGASTRODUODENOSCOPY);  Surgeon: Yamilet Walters MD;  Location: [address]);  Service: Gastroenterology;  Laterality: N/A;     ESOPHAGOGASTRODUODENOSCOPY N/A 1/26/2024    Procedure: EGD (ESOPHAGOGASTRODUODENOSCOPY);  Surgeon: Yamilet Walters MD;  Location: [facility] ENDO (2ND FLR);  Service: Gastroenterology;  Laterality: N/A;    HYSTERECTOMY  1987    BEAU w/ appy, no BSO     IMPLANTATION OF LEADLESS PACEMAKER N/A 1/16/2024    Procedure: INSERTION, CARDIAC PACEMAKER, LEADLESS;  Surgeon: LENIN Frances MD;  Location: [facility] EP LAB;  Service: Cardiology;  Laterality: N/A;  SB, LEADLESS PPM (MICRA), MDT, ANES, EH, CVICU [id number]    INJECTION OF NEUROLYTIC AGENT AROUND LUMBAR SYMPATHETIC NERVE N/A 1/6/2022    Procedure: BLOCK, LUMBAR SYMPATHETIC;  Surgeon: Souleymane Rizo Jr., MD;  Location: [facility] PAIN MGT;  Service: Pain Management;  Laterality: N/A;  no pacemaker   pt is diabetic     INJECTION OF NEUROLYTIC AGENT AROUND LUMBAR SYMPATHETIC NERVE N/A 8/25/2022    Procedure: BLOCK, LUMBAR SYMPATHETIC;  Surgeon: Souleymane Rizo Jr., MD;  Location: [facility] PAIN MGT;  Service: Pain Management;  Laterality: N/A;  diabetic     INSERTION OF TUNNELED CENTRAL VENOUS HEMODIALYSIS CATHETER Right 1/25/2023    Procedure: INSERTION, CATHETER, HEMODIALYSIS, DUAL LUMEN;  Surgeon: Manuel Javier MD;  Location: [facility] OR;  Service: General;  Laterality: Right;    INSERTION, CATHETER, TUNNELED Left 1/28/2023    Procedure: Insertion,catheter,tunneled;  Surgeon: Watson Fontenot MD;  Location: [facility] OR;  Service: General;  Laterality: Left;    LAPAROTOMY, EXPLORATORY N/A 1/14/2023    Procedure: LAPAROTOMY, EXPLORATORY;  Surgeon: Manuel Javier MD;  Location: [facility] OR;  Service: General;  Laterality: N/A;    LAPAROTOMY, EXPLORATORY N/A 1/16/2023    Procedure: LAPAROTOMY, EXPLORATORY;  Surgeon: Manuel Javier MD;  Location: [facility] OR;  Service: General;  Laterality: N/A;    LYMPHADENECTOMY Right 1/2/2024    Procedure: LYMPHADENECTOMY;  Surgeon: Tan Thomson MD;  Location: [facility] OR 2ND FLR;  Service: Cardiothoracic;  Laterality: Right;    PACEMAKER, TEMPORARY,  TRANSVENOUS, PEDIATRIC Right 1/12/2024    Procedure: Pacemaker, Temporary, Transvenous;  Surgeon: Todd Carlisle MD;  Location: [facility] CATH LAB;  Service: Cardiology;  Laterality: Right;    REMOVAL OF CATHETER Right 1/28/2023    Procedure: REMOVAL, CATHETER;  Surgeon: Watson Fontenot MD;  Location: [facility] OR;  Service: General;  Laterality: Right;    REMOVAL, TUNNELED CATH Right 1/25/2023    Procedure: REMOVAL, TUNNELED CATH;  Surgeon: Manuel Javier MD;  Location: [facility] OR;  Service: General;  Laterality: Right;    ROBOTIC BRONCHOSCOPY N/A 10/20/2023    Procedure: ROBOTIC BRONCHOSCOPY;  Surgeon: Mayda Monzon MD;  [facility];  Service: Pulmonary;  Laterality: N/A;    SHOULDER SURGERY  2009 & 2010    right rotator cuff    THORACOTOMY Right 1/2/2024    Procedure: THORACOTOMY;  Surgeon: Tan Thomson MD;  [facility];  Service: Cardiothoracic;  Laterality: Right;    THORACOTOMY, WITH INITIAL THERAPEUTIC WEDGE RESECTION OF LUNG Right 1/2/2024    Procedure: THORACOTOMY, WITH INITIAL THERAPEUTIC WEDGE RESECTION OF LUNG;  Surgeon: Tan Thomson MD;  [facility];  Service: Cardiothoracic;  Laterality: Right;    TONSILLECTOMY      UPPER GASTROINTESTINAL ENDOSCOPY  04/2018       Review of patient's allergies indicates:   Allergen Reactions    Crestor [rosuvastatin] Swelling    Gluten protein        Medications:  Facility-Administered Medications Prior to Admission   Medication    capsaicin-skin cleanser patch 1 patch    capsaicin-skin cleanser patch 2 patch     Medications Prior to Admission   Medication Sig    atorvastatin (LIPITOR) 40 MG tablet Take 1 tablet (40 mg total) by mouth once daily. (Patient taking differently: Take 40 mg by mouth every evening.)    B complex-vitamin C-folic acid (MELLISSA-BENJY) 0.8 mg Tab Take 1 tablet (0.8 mg total) by mouth once daily.    betahistine HCl (BETAHISTINE, BULK, MISC)     blood sugar diagnostic (TRUE METRIX GLUCOSE TEST  "STRIP) Strp USE ONE STRIP FOR TESTING 2 TIMES A DAY    blood-glucose meter kit Use to test twice a day    calcium-vitamin D3 (OS-CIPRIANO 500 + D3) 500 mg-5 mcg (200 unit) per tablet Take 1 tablet by mouth once daily.    cefpodoxime (VANTIN) 200 MG tablet Take 1 tablet (200 mg total) by mouth every evening. for 13 days    dronabinoL (MARINOL) 5 MG capsule Take 1 capsule (5 mg total) by mouth 2 (two) times daily before meals.    epoetin noble-epbx (RETACRIT) 10,000 unit/mL imjection Inject 1 mL (10,000 Units total) into the skin every Mon, Wed, Fri. HOLD IF HEMOGLOBIN is 10 or GREATER    DO NOT SHAKE  DO NOT DILUTE  DO NOT MIX with other drug solutions    ferrous gluconate (FERGON) 324 MG tablet Take 1 tablet (324 mg total) by mouth daily with breakfast.    insulin aspart U-100 (NOVOLOG) 100 unit/mL (3 mL) InPn pen Inject before meals:  150-200=+1, 201-250=+2, 251-300=+3; 301-350=+4, over 350=+5 units    lancets Misc 1 lancet by Misc.(Non-Drug; Combo Route) route 4 (four) times daily.    meclizine (ANTIVERT) 25 mg tablet Take 1 tablet (25 mg total) by mouth 3 (three) times daily as needed for Dizziness.    melatonin (MELATIN) 3 mg tablet Take 3 tablets (9 mg total) by mouth nightly as needed for Insomnia.    metoprolol tartrate (LOPRESSOR) 25 MG tablet Take 0.5 tablets (12.5 mg total) by mouth 2 (two) times daily.    metroNIDAZOLE (FLAGYL) 500 MG tablet Take 1 tablet (500 mg total) by mouth every 12 (twelve) hours. for 13 days    midodrine (PROAMATINE) 10 MG tablet Take 1 tablet (10 mg total) by mouth 2 (two) times daily with meals.    nutritional supplements Liqd Take 237 mLs by mouth 4 (four) times daily.    pantoprazole (PROTONIX) 40 MG tablet Take 1 tablet (40 mg total) by mouth 2 (two) times daily before meals.    patiromer calcium sorbitex (VELTASSA) 8.4 gram PwPk Take 2 packets (16.8 g total) by mouth once daily.    pen needle, diabetic (BD ULTRA-FINE MARIS PEN NEEDLE) 32 gauge x 5/32" Ndle 1 Device by " Misc.(Non-Drug; Combo Route) route 4 (four) times daily with meals and nightly.    pregabalin (LYRICA) 75 MG capsule Take 1 capsule (75 mg total) by mouth Daily. Give after HD    sevelamer HCL (RENAGEL) 800 MG Tab Take 2 tablets (1,600 mg total) by mouth 3 (three) times daily with meals.    torsemide 40 mg Tab Take 40 mg by mouth once daily.    venlafaxine (EFFEXOR) 37.5 MG Tab Take 1 tablet (37.5 mg total) by mouth once daily.    vit C,A-Ro-ybnyv-lutein-zeaxan (PRESERVISION AREDS 2) 150-562-81-1 mg-unit-mg-mg Cap     vitamins  A,C,E-zinc-copper 14,320-226-200 unit-mg-unit Cap Take 1 tablet by mouth once daily.     Antibiotics (From admission, onward)      Start     Stop Route Frequency Ordered    02/12/24 0900  mupirocin 2 % ointment         02/17/24 0859 Nasl 2 times daily 02/12/24 0229    02/12/24 0545  piperacillin-tazobactam (ZOSYN) 4.5 g in dextrose 5 % in water (D5W) 100 mL IVPB (MB+)         -- IV Every 12 hours (non-standard times) 02/12/24 0440    02/12/24 0539  vancomycin - pharmacy to dose  (vancomycin IVPB (PEDS and ADULTS))        See Hyperspace for full Linked Orders Report.    -- IV pharmacy to manage frequency 02/12/24 0440          Antifungals (From admission, onward)      None          Antivirals (From admission, onward)      None             Immunization History   Administered Date(s) Administered    COVID-19, MRNA, LN-S, PF (Pfizer) (Purple Cap) 02/13/2021, 03/06/2021    Influenza 11/30/2012    Influenza (FLUAD) - Quadrivalent - Adjuvanted - PF *Preferred* (65+) 10/03/2020    Influenza Split 10/15/2019    Pneumococcal Conjugate - 13 Valent 04/27/2018    Pneumococcal Polysaccharide - 23 Valent 10/14/2019       Family History       Problem Relation (Age of Onset)    Arthritis Father    Breast cancer Mother, Paternal Aunt    Cancer Mother, Paternal Aunt    Diabetes Paternal Grandfather    Hyperlipidemia Sister    Vision loss Father, Mother, Sister          Social History     Socioeconomic History     Marital status:    Tobacco Use    Smoking status: Never    Smokeless tobacco: Never   Substance and Sexual Activity    Alcohol use: No    Drug use: Never    Sexual activity: Not Currently     Partners: Male     Birth control/protection: See Surgical Hx     Comment: :      Social Determinants of Health     Financial Resource Strain: Low Risk  (2/13/2024)    Overall Financial Resource Strain (CARDIA)     Difficulty of Paying Living Expenses: Not hard at all   Food Insecurity: No Food Insecurity (2/13/2024)    Hunger Vital Sign     Worried About Running Out of Food in the Last Year: Never true     Ran Out of Food in the Last Year: Never true   Transportation Needs: No Transportation Needs (2/13/2024)    PRAPARE - Transportation     Lack of Transportation (Medical): No     Lack of Transportation (Non-Medical): No   Physical Activity: Insufficiently Active (11/22/2023)    Exercise Vital Sign     Days of Exercise per Week: 4 days     Minutes of Exercise per Session: 10 min   Stress: Stress Concern Present (2/13/2024)    [employer] [place] [employer] - Occupational Stress Questionnaire     Feeling of Stress : To some extent   Social Connections: Unknown (11/22/2023)    Social Connection and Isolation Panel [NHANES]     Frequency of Communication with Friends and Family: More than three times a week     Frequency of Social Gatherings with Friends and Family: More than three times a week     Active Member of Clubs or Organizations: Yes     Attends Club or Organization Meetings: More than 4 times per year     Marital Status:    Housing Stability: Low Risk  (2/13/2024)    Housing Stability Vital Sign     Unable to Pay for Housing in the Last Year: No     Number of Places Lived in the Last Year: 1     Unstable Housing in the Last Year: No     Review of Systems   Respiratory:  Positive for shortness of breath.    All other systems reviewed and are negative.    Objective:     Vital Signs (Most  Recent):  Temp: 98.2 °F (36.8 °C) (02/13/24 1600)  Pulse: 95 (02/13/24 1700)  Resp: (!) 39 (02/13/24 1700)  BP: 99/67 (02/13/24 1700)  SpO2: 95 % (02/13/24 1700) Vital Signs (24h Range):  Temp:  [97.7 °F (36.5 °C)-98.7 °F (37.1 °C)] 98.2 °F (36.8 °C)  Pulse:  [] 95  Resp:  [17-47] 39  SpO2:  [88 %-100 %] 95 %  BP: ()/(52-89) 99/67     Weight: 67.6 kg (149 lb 0.5 oz)  Body mass index is 24.8 kg/m².    Estimated Creatinine Clearance: 30.1 mL/min (A) (based on SCr of 1.5 mg/dL (H)).     Physical Exam  Vitals and nursing note reviewed.   Constitutional:       General: She is in acute distress (visibly short of breath).      Appearance: She is well-developed. She is ill-appearing. She is not diaphoretic.   HENT:      Head: Normocephalic and atraumatic.      Right Ear: External ear normal.      Left Ear: External ear normal.      Nose: Nose normal.      Mouth/Throat:      Pharynx: No oropharyngeal exudate.   Eyes:      General: No scleral icterus.        Right eye: No discharge.         Left eye: No discharge.      Conjunctiva/sclera: Conjunctivae normal.      Pupils: Pupils are equal, round, and reactive to light.   Neck:      Thyroid: No thyromegaly.      Vascular: No JVD.      Trachea: No tracheal deviation.   Cardiovascular:      Rate and Rhythm: Normal rate and regular rhythm.      Heart sounds: No murmur heard.     No friction rub. No gallop.   Pulmonary:      Effort: No respiratory distress.      Breath sounds: Normal breath sounds. No stridor. No wheezing or rales.      Comments: Tachypneic. Visibly short of breath    Chest:      Chest wall: No tenderness.   Abdominal:      General: Bowel sounds are normal. There is no distension.      Palpations: Abdomen is soft. There is no mass.      Tenderness: There is no abdominal tenderness. There is no guarding or rebound.   Musculoskeletal:         General: No tenderness. Normal range of motion.      Cervical back: Normal range of motion and neck supple.    Lymphadenopathy:      Cervical: No cervical adenopathy.   Skin:     General: Skin is warm.      Coloration: Skin is not pale.      Findings: No erythema or rash.   Neurological:      Mental Status: She is alert and oriented to person, place, and time.      Cranial Nerves: No cranial nerve deficit.      Motor: No abnormal muscle tone.      Coordination: Coordination normal.      Deep Tendon Reflexes: Reflexes normal.          Significant Labs:   Microbiology Results (last 7 days)       Procedure Component Value Units Date/Time    Blood culture [[id number]] Collected: 02/12/24 0526    Order Status: Completed Specimen: Blood from Peripheral, Upper Arm, Right Updated: 02/13/24 0812     Blood Culture, Routine No Growth to date      No Growth to date    Blood culture [[id number]] Collected: 02/12/24 0526    Order Status: Completed Specimen: Blood from Peripheral, Antecubital, Right Updated: 02/13/24 0812     Blood Culture, Routine No Growth to date      No Growth to date            Significant Imaging: I have reviewed all pertinent imaging results/findings within the past 24 hours.

## 2024-02-13 NOTE — ASSESSMENT & PLAN NOTE
Goal hemoglobin in ESRD is 10-12  Hemoglobin   Date Value Ref Range Status   02/13/2024 9.2 (L) 12.0 - 16.0 g/dL Final     Iron   Date Value Ref Range Status   02/02/2024 52 30 - 160 ug/dL Final     Transferrin   Date Value Ref Range Status   02/02/2024 121 (L) 200 - 375 mg/dL Final     TIBC   Date Value Ref Range Status   02/02/2024 179 (L) 250 - 450 ug/dL Final     Saturated Iron   Date Value Ref Range Status   02/02/2024 29 20 - 50 % Final     Ferritin   Date Value Ref Range Status   02/02/2024 1,803 (H) 20.0 - 300.0 ng/mL Final

## 2024-02-13 NOTE — CONSULTS
Pharmacokinetic Assessment Follow Up: IV Vancomycin    Vancomycin serum concentration assessment(s):    The random level was drawn correctly and can be used to guide therapy at this time. The measurement is below the desired definitive target range of 10 to 20 mcg/mL.  Per nephrology, plan for 10 hours of SLED tonight    Vancomycin Regimen Plan:    Will dose Vancomycin 1000 mg IV x 1   Re-dose when the random level is less than 20 mcg/mL, next level to be drawn at 0500 on 2/14/24    Drug levels (last 3 results):  Recent Labs   Lab Result Units 02/13/24  0334   Vancomycin, Random ug/mL 7.1       Pharmacy will continue to follow and monitor vancomycin.    Please contact pharmacy at extension [phone] for questions regarding this assessment.    Thank you for the consult,   Altagracia Burk, PharmD       Patient brief summary:  Lily Green is a 73 y.o. female initiated on antimicrobial therapy with IV Vancomycin for treatment of sepsis    Drug Allergies:   Review of patient's allergies indicates:   Allergen Reactions    Crestor [rosuvastatin] Swelling    Gluten protein        Actual Body Weight:   67.6 kg    Renal Function:   Estimated Creatinine Clearance: 41 mL/min (based on SCr of 1.1 mg/dL).,     Dialysis Method (if applicable):  N/A    CBC (last 72 hours):  Recent Labs   Lab Result Units 02/12/24  0115 02/12/24 0556 02/13/24  0334   WBC K/uL 11.47 12.03 11.06   Hemoglobin g/dL 10.5* 10.6* 9.2*   Hematocrit % 33.0* 33.4* 29.6*   Platelets K/uL 334 207 178   Gran % % 92.0* 83.1* 80.1*   Lymph % % 3.0* 5.0* 8.4*   Mono % % 4.0 11.1 10.8   Eosinophil % % 0.0 0.0 0.0   Basophil % % 0.0 0.2 0.1   Differential Method  Automated Automated Automated       Metabolic Panel (last 72 hours):  Recent Labs   Lab Result Units 02/12/24  0115 02/12/24  0556 02/12/24 2120 02/13/24  0334   Sodium mmol/L 133* 135* 131* 131*  131*   Potassium mmol/L 3.8 3.6 4.1 4.3  4.3   Chloride mmol/L 93* 97 99 97  97   CO2 mmol/L 24 25 24 23   23   Glucose mg/dL 266* 178* 170* 172*  172*   BUN mg/dL 34* 21 18 9  9   Creatinine mg/dL 4.0* 2.6* 2.2* 1.1  1.1   Albumin g/dL 2.5* 2.5* 2.3* 2.1*  2.1*   Total Bilirubin mg/dL 0.4 0.3  --  0.3   Alkaline Phosphatase U/L 101 101  --  93   AST U/L 17 23  --  29   ALT U/L 7* 8*  --  10   Magnesium mg/dL  --  1.8 1.8 2.1  2.1   Phosphorus mg/dL  --  1.6* 1.6* 3.7  3.7       Vancomycin Administrations:  vancomycin given in the last 96 hours                     vancomycin (VANCOCIN) 1,000 mg in dextrose 5 % (D5W) 250 mL IVPB (Vial-Mate) (mg) 1,000 mg New Bag 02/12/24 0533                    Microbiologic Results:  Microbiology Results (last 7 days)       Procedure Component Value Units Date/Time    Blood culture [[id number]] Collected: 02/12/24 0526    Order Status: Completed Specimen: Blood from Peripheral, Upper Arm, Right Updated: 02/13/24 0812     Blood Culture, Routine No Growth to date      No Growth to date    Blood culture [[id number]] Collected: 02/12/24 0526    Order Status: Completed Specimen: Blood from Peripheral, Antecubital, Right Updated: 02/13/24 0812     Blood Culture, Routine No Growth to date      No Growth to date

## 2024-02-13 NOTE — ASSESSMENT & PLAN NOTE
ESRD   Hypoxic resp failure - improved with 1.5 liter removal;   ECHO 2/12 with newly depressed EF of 10-15% from 60-65% one month prior    Plan/Recommendation  -Plan for nocturnal SLED tonight again tonight for 10 hours with UF as tolerated, lower UF before adding vasopressor support  -Keep MAP > 65  -Keep hemoglobin > 7  -Strict ins and outs  -Avoid nephrotoxic agents if possible and renally dose medications  -Avoid drastic hemodynamic changes if possible

## 2024-02-13 NOTE — ASSESSMENT & PLAN NOTE
Malnutrition Type:  Context: chronic illness  Level: moderate    Related to (etiology):   Inadequate energy intake    Signs and Symptoms (as evidenced by):   Malnutrition Characteristic Summary:  Energy Intake (Malnutrition): less than 75% for greater than or equal to 1 month  Muscle Mass (Malnutrition): mild depletion  Fluid Accumulation (Malnutrition): severe    Interventions/Recommendations (treatment strategy):  1. Continue gluten free diet with texture per SLP  - Add Keith restriction per pt request 2. modify [facility]  3. RD to monitor and follow    Nutrition Diagnosis Status:   New

## 2024-02-13 NOTE — ASSESSMENT & PLAN NOTE
Patient is identified as having Systolic (HFrEF) heart failure that is Acute. CHF is currently uncontrolled due to Rales/crackles on pulmonary exam and Pulmonary edema/pleural effusion on CXR. Latest ECHO performed and demonstrates- Results for orders placed during the hospital encounter of 02/12/24    Echo    Interpretation Summary    Left Ventricle: The left ventricle is normal in size. Normal wall thickness. Regional wall motion abnormalities present. See diagram for wall motion findings. There is severely reduced systolic function with a visually estimated ejection fraction of 15 - 20%. Ejection fraction by visual approximation is 15%. Unable to assess diastolic function due to atrial fibrillation.    Right Ventricle: Normal right ventricular cavity size. Wall thickness is normal. Right ventricle wall motion  is normal. Systolic function is normal.    Left Atrium: Left atrium is severely dilated.    Right Atrium: Right atrium is mildly dilated.    Aortic Valve: There is moderate aortic valve sclerosis. Mildly restricted motion.    Mitral Valve: There is moderate to severe regurgitation.    Tricuspid Valve: There is mild regurgitation.    IVC/SVC: Intermediate venous pressure at 8 mmHg.    Pericardium: There is a trivial effusion. Left pleural effusion.  . Continue Beta Blocker, ACE/ARB, and ARNI and monitor clinical status closely. Monitor on telemetry. Patient is on CHF pathway.  Monitor strict Is&Os and daily weights.  Place on fluid restriction of 1.5 L. Cardiology has been consulted. Continue to stress to patient importance of self efficacy and  on diet for CHF. Last BNP reviewed- and noted below   Recent Labs   Lab 02/12/24  0115   BNP 2,754*

## 2024-02-13 NOTE — CONSULTS
[facility] - [facility] Intensive [facility]  Adult Nutrition  Consult Note    SUMMARY     Recommendations    1. Continue gluten free diet with texture per SLP     - Add Vicco restriction per pt request    2. modify ONS to Novasource renal TID      3. RD to monitor and follow    Goals: Meet % EEN, EPN by RD f/u  Nutrition Goal Status: new  Communication of RD Recs:  (POC)    Assessment and Plan    Endocrine  Moderate malnutrition  Malnutrition Type:  Context: chronic illness  Level: moderate    Related to (etiology):   Inadequate energy intake    Signs and Symptoms (as evidenced by):   Malnutrition Characteristic Summary:  Energy Intake (Malnutrition): less than 75% for greater than or equal to 1 month  Muscle Mass (Malnutrition): mild depletion  Fluid Accumulation (Malnutrition): severe    Interventions/Recommendations (treatment strategy):  1. Continue gluten free diet with texture per SLP  - Add Vicco restriction per pt request 2. modify ONS to Novasource renal TID  3. RD to monitor and follow    Nutrition Diagnosis Status:   New    Malnutrition Assessment  Malnutrition Context: chronic illness  Malnutrition Level: moderate  Skin (Micronutrient): bruised, edema  Nails (Micronutrient): none  Hair/Scalp (Micronutrient): none  Eyes (Micronutrient): none  Extraoral (Micronutrient): none  Gums (Micronutrient): none  Lips/Mucous Membranes (Micronutrient): none  Teeth (Micronutrient): none  Tongue (Micronutrient): none  Neck/Chest (Micronutrient): none  Musculoskeletal/Lower Extremities: muscle wasting       Energy Intake (Malnutrition): less than 75% for greater than or equal to 1 month  Muscle Mass (Malnutrition): mild depletion  Fluid Accumulation (Malnutrition): severe   Orbital Region (Subcutaneous Fat Loss): well nourished  Upper Arm Region (Subcutaneous Fat Loss): well nourished   Roman [employer] Region (Muscle Loss): well nourished  Clavicle Bone Region (Muscle Loss): well nourished  Clavicle and Acromion Bone Region  "(Muscle Loss): well nourished  Patellar Region (Muscle Loss): mild depletion  Anterior Thigh Region (Muscle Loss): mild depletion  Posterior Calf Region (Muscle Loss): mild depletion       Reason for Assessment    Reason For Assessment: consult  Diagnosis:  (acute hypoxemic respiratory failure)  Relevant Medical History: T2DM, HLD, ESRD, anemia, BIG, CHF  Interdisciplinary Rounds: did not attend    General Information Comments:   Pt with hx of ESRD on CRRT. Pt reports appetite remains low. Dislike hospital food, only eat food with light seasoning. Tolerates Novasource TID. Enjoys chicken broth - requesting on trays. Discussed food preferences. Dislike wilkinson, sausage, grits. Per wt hx, wt fluctuated between 130-149 lb. NFPE completed today, noted mild muscle depletion. 4+ edema on BUE per chart. Pt meet criteria for     Nutrition Discharge Planning: Adequate PO intake    Nutrition Risk Screen    Nutrition Risk Screen: reduced oral intake over the last month    Nutrition/Diet History    Patient Reported Diet/Restrictions/Preferences: gluten-free, renal, diabetic diet  Spiritual, Cultural Beliefs, Oriental orthodox Practices, Values that Affect Care: no  Factors Affecting Nutritional Intake: decreased appetite    Anthropometrics    Temp: 97.8 °F (36.6 °C)  Height Method: Stated  Height: 5' 5" (165.1 cm)  Height (inches): 65 in  Weight Method: Bed Scale  Weight: 67.6 kg (149 lb 0.5 oz)  Weight (lb): 149.03 lb  Ideal Body Weight (IBW), Female: 125 lb  % Ideal Body Weight, Female (lb): 119.22 %  BMI (Calculated): 24.8     Lab/Procedures/Meds    Pertinent Labs Reviewed: reviewed  Pertinent Labs Comments: glucose 172, Na 131, albumin 2.1, eGFR 53.1  Pertinent Medications Reviewed: reviewed  Pertinent Medications Comments: vancomycin, ferrous gluconate, sevelamer carbonate, pantoprazole, heparin, atrovastatin    Estimated/Assessed Needs    Weight Used For Calorie Calculations: 67.6 kg (149 lb)  Energy Calorie Requirements (kcal): " 9299-9126 kcal  Energy Need Method: Kcal/kg (25-30)  Protein Requirements: 68-81g (1-1.2g/kg )  Weight Used For Protein Calculations: 67.6 kg (149 lb)  Fluid Requirements (mL): 1ml/kcal or per MD  Estimated Fluid Requirement Method: RDA Method  RDA Method (mL): 1689  CHO Requirement: 211-253g      Nutrition Prescription Ordered    Current Diet Order: level 6, gluten free    Evaluation of Received Nutrient/Fluid Intake    Energy Calories Required: not meeting needs  Protein Required: not meeting needs  Comments: LBM 2/12  Tolerance: tolerating  % Intake of Estimated Energy Needs: 25 - 50 %  % Meal Intake: 25 - 50 %    Nutrition Risk    Level of Risk/Frequency of Follow-up:  (1x/week)       Monitor and Evaluation    Food and Nutrient Intake: energy intake, food and beverage intake  Food and Nutrient Adminstration: diet order  Knowledge/Beliefs/Attitudes: beliefs and attitudes  Physical Activity and Function: nutrition-related ADLs and IADLs  Anthropometric Measurements: height/length, weight, weight change, body mass index  Biochemical Data, Medical Tests and Procedures: electrolyte and renal panel, gastrointestinal profile, glucose/endocrine profile, inflammatory profile, lipid profile  Nutrition-Focused Physical Findings: overall appearance       Nutrition Follow-Up    RD Follow-up?: Yes

## 2024-02-13 NOTE — ASSESSMENT & PLAN NOTE
Patient presents with acute onset of respiratory failure and was found to have a drop in her EF to 15%.  Presenting rhythm was atrial flutter with 2:1 now she is now sinus tachycardic  Also has elevated troponin that trended up to 2.  Patient's lower extremities are cold and appears mottled which is concerning for shock  SvO2 checked from subclavian line is 56   CO/CI 4.2/2.3    lactic acid 1.8    Svo2 today 68, CVP 15.     Plan  Recommend to keep her on metoprolol succinate to 25 mg daily and titrate up  Recommend to start patient on valsartan 40 mg b.i.d. and titrate up as tolerated  Patient will need diuresis today   Patient will need ischemic workup so please consult intervention cardiology for left heart catheterization once she is stable from respiratory standpoint.

## 2024-02-13 NOTE — SUBJECTIVE & OBJECTIVE
Past Medical History:   Diagnosis Date    Anxiety     Celiac disease 2018    Celiac disease     Depression     Diabetes mellitus     Family history of breast cancer in mother      at age 68    Hyperlipidemia     Hypertension     Meniere disease     Meniere's disease, unspecified ear     Menopause     Peptic ulcer     Reflux esophagitis     Urinary tract infection     Vaginal infection     Vaginal Pap smear 2014    Pap/Hpv Negative        Past Surgical History:   Procedure Laterality Date    APPENDECTOMY      BREAST SURGERY      Tags    CARPAL TUNNEL RELEASE Bilateral 2017    ,     CLOSURE, COLOSTOMY Left 2023    Procedure: CLOSURE, COLOSTOMY;  Surgeon: Manuel Javier MD;  Location: [facility] OR;  Service: General;  Laterality: Left;    COLONOSCOPY  2018    Normal  ( Juan A)     COLOSTOMY Right 2023    Procedure: CREATION, COLOSTOMY;  Surgeon: Manuel Javier MD;  Location: [facility] OR;  Service: General;  Laterality: Right;    DILATION AND CURETTAGE OF UTERUS  1986    DUPUYTREN CONTRACTURE RELEASE Bilateral 2017    ESOPHAGOGASTRODUODENOSCOPY N/A 2024    Procedure: EGD (ESOPHAGOGASTRODUODENOSCOPY);  Surgeon: Yamilet Walters MD;  Location: [facility] ([address] [place] [address]);  Service: Gastroenterology;  Laterality: N/A;    ESOPHAGOGASTRODUODENOSCOPY N/A 2024    Procedure: EGD (ESOPHAGOGASTRODUODENOSCOPY);  Surgeon: Yamilet Walters MD;  Location: [address]);  Service: Gastroenterology;  Laterality: N/A;    HYSTERECTOMY      BEAU w/ appy, no BSO     IMPLANTATION OF LEADLESS PACEMAKER N/A 2024    Procedure: INSERTION, CARDIAC PACEMAKER, LEADLESS;  Surgeon: LENIN Frances MD;  Location: [facility] EP LAB;  Service: Cardiology;  Laterality: N/A;  SB, LEADLESS PPM (MICRA)SAM, ANES, EH, CVICU [id number]    INJECTION OF NEUROLYTIC AGENT AROUND LUMBAR SYMPATHETIC NERVE N/A 2022    Procedure: BLOCK, LUMBAR SYMPATHETIC;  Surgeon: Souleymane Rizo Jr., MD;  Location: [facility]  PAIN MGT;  Service: Pain Management;  Laterality: N/A;  no pacemaker   pt is diabetic     INJECTION OF NEUROLYTIC AGENT AROUND LUMBAR SYMPATHETIC NERVE N/A 8/25/2022    Procedure: BLOCK, LUMBAR SYMPATHETIC;  Surgeon: Souleymane Rizo Jr., MD;  Location: [facility] PAIN MGT;  Service: Pain Management;  Laterality: N/A;  diabetic     INSERTION OF TUNNELED CENTRAL VENOUS HEMODIALYSIS CATHETER Right 1/25/2023    Procedure: INSERTION, CATHETER, HEMODIALYSIS, DUAL LUMEN;  Surgeon: Manuel Javier MD;  Location: [facility] OR;  Service: General;  Laterality: Right;    INSERTION, CATHETER, TUNNELED Left 1/28/2023    Procedure: Insertion,catheter,tunneled;  Surgeon: Watson Fontenot MD;  Location: [facility] OR;  Service: General;  Laterality: Left;    LAPAROTOMY, EXPLORATORY N/A 1/14/2023    Procedure: LAPAROTOMY, EXPLORATORY;  Surgeon: Manuel Javier MD;  Location: [facility] OR;  Service: General;  Laterality: N/A;    LAPAROTOMY, EXPLORATORY N/A 1/16/2023    Procedure: LAPAROTOMY, EXPLORATORY;  Surgeon: Manuel Javier MD;  Location: [facility] OR;  Service: General;  Laterality: N/A;    LYMPHADENECTOMY Right 1/2/2024    Procedure: LYMPHADENECTOMY;  Surgeon: Tan Thomson MD;  Location: [address];  Service: Cardiothoracic;  Laterality: Right;    PACEMAKER, TEMPORARY, TRANSVENOUS, PEDIATRIC Right 1/12/2024    Procedure: Pacemaker, Temporary, Transvenous;  Surgeon: Todd Carlisle MD;  Location: [facility] CATH LAB;  Service: Cardiology;  Laterality: Right;    REMOVAL OF CATHETER Right 1/28/2023    Procedure: REMOVAL, CATHETER;  Surgeon: Watson Fontenot MD;  Location: [facility] OR;  Service: General;  Laterality: Right;    REMOVAL, TUNNELED CATH Right 1/25/2023    Procedure: REMOVAL, TUNNELED CATH;  Surgeon: Manuel Javier MD;  Location: [facility] OR;  Service: General;  Laterality: Right;    ROBOTIC BRONCHOSCOPY N/A 10/20/2023    Procedure: ROBOTIC BRONCHOSCOPY;  Surgeon: Mayda Monzon MD;  [facility];  Service:  Pulmonary;  Laterality: N/A;    SHOULDER SURGERY  2009 & 2010    right rotator cuff    THORACOTOMY Right 1/2/2024    Procedure: THORACOTOMY;  Surgeon: Tan Thomson MD;  Location: [address];  Service: Cardiothoracic;  Laterality: Right;    THORACOTOMY, WITH INITIAL THERAPEUTIC WEDGE RESECTION OF LUNG Right 1/2/2024    Procedure: THORACOTOMY, WITH INITIAL THERAPEUTIC WEDGE RESECTION OF LUNG;  Surgeon: Tan Thomson MD;  Location: [facility] OR [address] [place] [address];  Service: Cardiothoracic;  Laterality: Right;    TONSILLECTOMY      UPPER GASTROINTESTINAL ENDOSCOPY  04/2018       Review of patient's allergies indicates:   Allergen Reactions    Crestor [rosuvastatin] Swelling    Gluten protein        Medications:  Facility-Administered Medications Prior to Admission   Medication    capsaicin-skin cleanser patch 1 patch    capsaicin-skin cleanser patch 2 patch     Medications Prior to Admission   Medication Sig    atorvastatin (LIPITOR) 40 MG tablet Take 1 tablet (40 mg total) by mouth once daily. (Patient taking differently: Take 40 mg by mouth every evening.)    B complex-vitamin C-folic acid (MELLISSA-BENJY) 0.8 mg Tab Take 1 tablet (0.8 mg total) by mouth once daily.    betahistine HCl (BETAHISTINE, BULK, MISC)     blood sugar diagnostic (TRUE METRIX GLUCOSE TEST STRIP) Strp USE ONE STRIP FOR TESTING 2 TIMES A DAY    blood-glucose meter kit Use to test twice a day    calcium-vitamin D3 (OS-CIPRIANO 500 + D3) 500 mg-5 mcg (200 unit) per tablet Take 1 tablet by mouth once daily.    cefpodoxime (VANTIN) 200 MG tablet Take 1 tablet (200 mg total) by mouth every evening. for 13 days    dronabinoL (MARINOL) 5 MG capsule Take 1 capsule (5 mg total) by mouth 2 (two) times daily before meals.    epoetin noble-epbx (RETACRIT) 10,000 unit/mL imjection Inject 1 mL (10,000 Units total) into the skin every Mon, Wed, Fri. HOLD IF HEMOGLOBIN is 10 or GREATER    DO NOT SHAKE  DO NOT DILUTE  DO NOT MIX with other drug solutions    ferrous  "gluconate (FERGON) 324 MG tablet Take 1 tablet (324 mg total) by mouth daily with breakfast.    insulin aspart U-100 (NOVOLOG) 100 unit/mL (3 mL) InPn pen Inject before meals:  150-200=+1, 201-250=+2, 251-300=+3; 301-350=+4, over 350=+5 units    lancets Misc 1 lancet by Misc.(Non-Drug; Combo Route) route 4 (four) times daily.    meclizine (ANTIVERT) 25 mg tablet Take 1 tablet (25 mg total) by mouth 3 (three) times daily as needed for Dizziness.    melatonin (MELATIN) 3 mg tablet Take 3 tablets (9 mg total) by mouth nightly as needed for Insomnia.    metoprolol tartrate (LOPRESSOR) 25 MG tablet Take 0.5 tablets (12.5 mg total) by mouth 2 (two) times daily.    metroNIDAZOLE (FLAGYL) 500 MG tablet Take 1 tablet (500 mg total) by mouth every 12 (twelve) hours. for 13 days    midodrine (PROAMATINE) 10 MG tablet Take 1 tablet (10 mg total) by mouth 2 (two) times daily with meals.    nutritional supplements Liqd Take 237 mLs by mouth 4 (four) times daily.    pantoprazole (PROTONIX) 40 MG tablet Take 1 tablet (40 mg total) by mouth 2 (two) times daily before meals.    patiromer calcium sorbitex (VELTASSA) 8.4 gram PwPk Take 2 packets (16.8 g total) by mouth once daily.    pen needle, diabetic (BD ULTRA-FINE MARIS PEN NEEDLE) 32 gauge x 5/32" Ndle 1 Device by Misc.(Non-Drug; Combo Route) route 4 (four) times daily with meals and nightly.    pregabalin (LYRICA) 75 MG capsule Take 1 capsule (75 mg total) by mouth Daily. Give after HD    sevelamer HCL (RENAGEL) 800 MG Tab Take 2 tablets (1,600 mg total) by mouth 3 (three) times daily with meals.    torsemide 40 mg Tab Take 40 mg by mouth once daily.    venlafaxine (EFFEXOR) 37.5 MG Tab Take 1 tablet (37.5 mg total) by mouth once daily.    vit C,I-Ra-ikftf-lutein-zeaxan (PRESERVISION AREDS 2) 285-696-89-1 mg-unit-mg-mg Cap     vitamins  A,C,E-zinc-copper 14,320-226-200 unit-mg-unit Cap Take 1 tablet by mouth once daily.     Antibiotics (From admission, onward)      Start     Stop " Route Frequency Ordered    02/12/24 0900  mupirocin 2 % ointment         02/17/24 0859 Nasl 2 times daily 02/12/24 0229    02/12/24 0545  piperacillin-tazobactam (ZOSYN) 4.5 g in dextrose 5 % in water (D5W) 100 mL IVPB (MB+)         -- IV Every 12 hours (non-standard times) 02/12/24 0440    02/12/24 0539  vancomycin - pharmacy to dose  (vancomycin IVPB (PEDS and ADULTS))        See Hyperspace for full Linked Orders Report.    -- IV pharmacy to manage frequency 02/12/24 0440          Antifungals (From admission, onward)      None          Antivirals (From admission, onward)      None             Immunization History   Administered Date(s) Administered    COVID-19, MRNA, LN-S, PF (Pfizer) (Purple Cap) 02/13/2021, 03/06/2021    Influenza 11/30/2012    Influenza (FLUAD) - Quadrivalent - Adjuvanted - PF *Preferred* (65+) 10/03/2020    Influenza Split 10/15/2019    Pneumococcal Conjugate - 13 Valent 04/27/2018    Pneumococcal Polysaccharide - 23 Valent 10/14/2019       Family History       Problem Relation (Age of Onset)    Arthritis Father    Breast cancer Mother, Paternal Aunt    Cancer Mother, Paternal Aunt    Diabetes Paternal Grandfather    Hyperlipidemia Sister    Vision loss Father, Mother, Sister          Social History     Socioeconomic History    Marital status:    Tobacco Use    Smoking status: Never    Smokeless tobacco: Never   Substance and Sexual Activity    Alcohol use: No    Drug use: Never    Sexual activity: Not Currently     Partners: Male     Birth control/protection: See Surgical Hx     Comment: :      Social Determinants of Health     Financial Resource Strain: Low Risk  (2/13/2024)    Overall Financial Resource Strain (CARDIA)     Difficulty of Paying Living Expenses: Not hard at all   Food Insecurity: No Food Insecurity (2/13/2024)    Hunger Vital Sign     Worried About Running Out of Food in the Last Year: Never true     Ran Out of Food in the Last Year: Never true   Transportation  Needs: No Transportation Needs (2/13/2024)    PRAPARE - Transportation     Lack of Transportation (Medical): No     Lack of Transportation (Non-Medical): No   Physical Activity: Insufficiently Active (11/22/2023)    Exercise Vital Sign     Days of Exercise per Week: 4 days     Minutes of Exercise per Session: 10 min   Stress: Stress Concern Present (2/13/2024)    [place] [employer] - Occupational Stress Questionnaire     Feeling of Stress : To some extent   Social Connections: Unknown (11/22/2023)    Social Connection and Isolation Panel [NHANES]     Frequency of Communication with Friends and Family: More than three times a week     Frequency of Social Gatherings with Friends and Family: More than three times a week     Active Member of Clubs or Organizations: Yes     Attends Club or Organization Meetings: More than 4 times per year     Marital Status:    Housing Stability: Low Risk  (2/13/2024)    Housing Stability Vital Sign     Unable to Pay for Housing in the Last Year: No     Number of Places Lived in the Last Year: 1     Unstable Housing in the Last Year: No     Review of Systems   Respiratory:  Positive for shortness of breath.    All other systems reviewed and are negative.    Objective:     Vital Signs (Most Recent):  Temp: 98.2 °F (36.8 °C) (02/13/24 1600)  Pulse: 95 (02/13/24 1700)  Resp: (!) 39 (02/13/24 1700)  BP: 99/67 (02/13/24 1700)  SpO2: 95 % (02/13/24 1700) Vital Signs (24h Range):  Temp:  [97.7 °F (36.5 °C)-98.7 °F (37.1 °C)] 98.2 °F (36.8 °C)  Pulse:  [] 95  Resp:  [17-47] 39  SpO2:  [88 %-100 %] 95 %  BP: ()/(52-89) 99/67     Weight: 67.6 kg (149 lb 0.5 oz)  Body mass index is 24.8 kg/m².    Estimated Creatinine Clearance: 30.1 mL/min (A) (based on SCr of 1.5 mg/dL (H)).     Physical Exam  Vitals and nursing note reviewed.   Constitutional:       General: She is in acute distress (visibly short of breath).      Appearance: She is well-developed. She  is ill-appearing. She is not diaphoretic.   HENT:      Head: Normocephalic and atraumatic.      Right Ear: External ear normal.      Left Ear: External ear normal.      Nose: Nose normal.      Mouth/Throat:      Pharynx: No oropharyngeal exudate.   Eyes:      General: No scleral icterus.        Right eye: No discharge.         Left eye: No discharge.      Conjunctiva/sclera: Conjunctivae normal.      Pupils: Pupils are equal, round, and reactive to light.   Neck:      Thyroid: No thyromegaly.      Vascular: No JVD.      Trachea: No tracheal deviation.   Cardiovascular:      Rate and Rhythm: Normal rate and regular rhythm.      Heart sounds: No murmur heard.     No friction rub. No gallop.   Pulmonary:      Effort: No respiratory distress.      Breath sounds: Normal breath sounds. No stridor. No wheezing or rales.      Comments: Tachypneic. Visibly short of breath    Chest:      Chest wall: No tenderness.   Abdominal:      General: Bowel sounds are normal. There is no distension.      Palpations: Abdomen is soft. There is no mass.      Tenderness: There is no abdominal tenderness. There is no guarding or rebound.   Musculoskeletal:         General: No tenderness. Normal range of motion.      Cervical back: Normal range of motion and neck supple.   Lymphadenopathy:      Cervical: No cervical adenopathy.   Skin:     General: Skin is warm.      Coloration: Skin is not pale.      Findings: No erythema or rash.   Neurological:      Mental Status: She is alert and oriented to person, place, and time.      Cranial Nerves: No cranial nerve deficit.      Motor: No abnormal muscle tone.      Coordination: Coordination normal.      Deep Tendon Reflexes: Reflexes normal.          Significant Labs:   Microbiology Results (last 7 days)       Procedure Component Value Units Date/Time    Blood culture [[id number]] Collected: 02/12/24 0526    Order Status: Completed Specimen: Blood from Peripheral, Upper Arm, Right Updated: 02/13/24  0812     Blood Culture, Routine No Growth to date      No Growth to date    Blood culture [[id number]] Collected: 02/12/24 0526    Order Status: Completed Specimen: Blood from Peripheral, Antecubital, Right Updated: 02/13/24 0812     Blood Culture, Routine No Growth to date      No Growth to date            Significant Imaging: I have reviewed all pertinent imaging results/findings within the past 24 hours.

## 2024-02-13 NOTE — SUBJECTIVE & OBJECTIVE
Interval History: Patient seen and examined this AM. No acute events overnight. Afebrile with pulse ranging from 130-80s bpm. Systolic blood pressures ranging from 150-80s mmHg via cuff pressures. She is saturating +96% on 5 liters via nasal cannula. She is net positive ~350 mL despite 1.5 liters removed yesterday with RRT with iHD initially and then SLED x10 hours overnight. CVP 10 mmHg this morning. Started on amiodarone for atrial fibrillation with RVR.    Review of patient's allergies indicates:   Allergen Reactions    Crestor [rosuvastatin] Swelling    Gluten protein      Current Facility-Administered Medications   Medication Frequency    0.9%  NaCl infusion (CRRT USE ONLY) Continuous    0.9%  NaCl infusion Once    0.9%  NaCl infusion Continuous    acetaminophen tablet 650 mg Q4H PRN    albuterol-ipratropium 2.5 mg-0.5 mg/3 mL nebulizer solution 3 mL Q4H PRN    amiodarone 360 mg/200 mL (1.8 mg/mL) infusion Continuous    atorvastatin tablet 40 mg QHS    dextrose 10% bolus 125 mL 125 mL PRN    dextrose 10% bolus 250 mL 250 mL PRN    droNABinol capsule 5 mg BID AC    ferrous gluconate tablet 324 mg Daily with breakfast    glucagon (human recombinant) injection 1 mg PRN    glucose chewable tablet 16 g PRN    glucose chewable tablet 24 g PRN    heparin (porcine) injection 5,000 Units Q8H    insulin aspart U-100 pen 0-5 Units QID (AC + HS) PRN    magnesium sulfate 2g in water 50mL IVPB (premix) PRN    melatonin tablet 6 mg Nightly PRN    metoprolol tartrate (LOPRESSOR) split tablet 12.5 mg BID    mupirocin 2 % ointment BID    ondansetron injection 4 mg Q6H PRN    pantoprazole EC tablet 40 mg BID AC    piperacillin-tazobactam (ZOSYN) 4.5 g in dextrose 5 % in water (D5W) 100 mL IVPB (MB+) Q12H    pregabalin capsule 75 mg Daily    sevelamer carbonate tablet 1,600 mg TID WM    sodium chloride 0.9% bolus 250 mL 250 mL PRN    sodium chloride 0.9% flush 10 mL PRN    sodium phosphate 20.01 mmol in dextrose 5 % (D5W) 250 mL  IVPB PRN    sodium phosphate 30 mmol in dextrose 5 % (D5W) 250 mL IVPB PRN    sodium phosphate 39.99 mmol in dextrose 5 % (D5W) 250 mL IVPB PRN    vancomycin - pharmacy to dose pharmacy to manage frequency    venlafaxine tablet 37.5 mg Daily       Objective:     Vital Signs (Most Recent):  Temp: 97.7 °F (36.5 °C) (02/13/24 0730)  Pulse: (!) 130 (02/13/24 0845)  Resp: (!) 28 (02/13/24 0845)  BP: 99/73 (02/13/24 0845)  SpO2: 98 % (02/13/24 0845) Vital Signs (24h Range):  Temp:  [97.7 °F (36.5 °C)-98.9 °F (37.2 °C)] 97.7 °F (36.5 °C)  Pulse:  [] 130  Resp:  [17-50] 28  SpO2:  [88 %-100 %] 98 %  BP: ()/(52-98) 99/73     Weight: 67.6 kg (149 lb 0.5 oz) (02/12/24 0830)  Body mass index is 24.8 kg/m².  Body surface area is 1.76 meters squared.    I/O last 3 completed shifts:  In: 3696.2 [I.V.:2339.1; Other:700; IV Piggyback:657.1]  Out: 4843 [Other:4843]     Physical Exam  Constitutional:       General: She is not in acute distress.     Appearance: She is not ill-appearing or toxic-appearing.   HENT:      Head: Normocephalic and atraumatic.      Nose: Nose normal.   Cardiovascular:      Comments: Right chest tunneled dialysis catheter  Pulmonary:      Breath sounds: Rhonchi present.   Abdominal:      General: Abdomen is flat. There is no distension.      Tenderness: There is no abdominal tenderness.   Musculoskeletal:      Right lower leg: No edema.      Left lower leg: No edema.      Comments: Left upper AVG, good thrill and hum   Skin:     General: Skin is warm.      Coloration: Skin is not jaundiced.      Findings: No bruising.   Neurological:      Mental Status: She is alert.          Significant Labs:  ABGs:   Recent Labs   Lab 02/12/24  0552 02/12/24  1703   PH 7.443  --    PCO2 44.1  --    HCO3 30.1*  --    POCSATURATED 23 56   BE 6*  --      BMP:   Recent Labs   Lab 02/13/24  0334   *  172*   *  131*   K 4.3  4.3   CL 97  97   CO2 23  23   BUN 9  9   CREATININE 1.1  1.1   CALCIUM  8.1*  8.1*   MG 2.1  2.1     CBC:   Recent Labs   Lab 02/13/24 0334   WBC 11.06   RBC 3.08*   HGB 9.2*   HCT 29.6*      MCV 96   MCH 29.9   MCHC 31.1*     CMP:   Recent Labs   Lab 02/13/24 0334   *  172*   CALCIUM 8.1*  8.1*   ALBUMIN 2.1*  2.1*   PROT 6.4   *  131*   K 4.3  4.3   CO2 23  23   CL 97  97   BUN 9  9   CREATININE 1.1  1.1   ALKPHOS 93   ALT 10   AST 29   BILITOT 0.3     LFTs:   Recent Labs   Lab 02/13/24 0334   ALT 10   AST 29   ALKPHOS 93   BILITOT 0.3   PROT 6.4   ALBUMIN 2.1*  2.1*     Microbiology Results (last 7 days)       Procedure Component Value Units Date/Time    Blood culture [[id number]] Collected: 02/12/24 0526    Order Status: Completed Specimen: Blood from Peripheral, Upper Arm, Right Updated: 02/13/24 0812     Blood Culture, Routine No Growth to date      No Growth to date    Blood culture [[id number]] Collected: 02/12/24 0526    Order Status: Completed Specimen: Blood from Peripheral, Antecubital, Right Updated: 02/13/24 0812     Blood Culture, Routine No Growth to date      No Growth to date          Specimen (24h ago, onward)      None          TSH:   Recent Labs   Lab 02/12/24 0556   TSH 18.183*     Significant Imaging:  I have reviewed all imagining in the last 24 hours.

## 2024-02-13 NOTE — PROGRESS NOTES
[facility]  Critical Care Medicine  Progress Note    Patient Name: Lily Green  MRN: [id number]  Admission Date: 2/12/2024  Hospital Length of Stay: 1 days  Code Status: Full Code  Attending Provider: Meghan Trujillo MD  Primary Care Provider: Pavel Calixto MD   Principal Problem: Acute hypoxemic respiratory failure    Subjective:     HPI:  Ms. Green is a 73 y.o. 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 She was last admitted from 1/2/24-2/5/24 (35 days) on the thoracic surgery service during which she underwent a right lower lobectomy for resection of her carcinoid tumor and MLND, ultimately requiring a thoracotomy. Post-op, she received a right sided chest tube. 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 Subsequent GIB and discovery of nonbleeding gastric ulcers on EGD made full anticoagulation high risk, so it was stopped. She was ultimately Dc'd to [facility] in [place] where she presents from now.     She presents today from SNF with increased work of breathing, dyspnea, and hypoxia, with oxygen saturation in the 70% on 2L. She became acutely short of breath today and was brought in by EMS after being put on CPAP in the field with some improvement. Placed on BIPAP in the ED. She continues to be tachycardic and tachypnic. She last completed a full session of HD on Saturday. She reports cough and nausea, but denies fever, chills, pain, chest pain, neck pain. She has little residual renal function . Unclear if she has been taking her Torsemide listed as her home med. Her workup was significant for BNP of 2754, Trop 0.312. VBG reveals pH 7.438/38.9/29. EKG reveals sinus tachycardia. CXR has evident pleural effusions and possible multifocal pneumonia. Nephro was consulted  and emergent HD orders were placed with goal of removing 3 L urgently. She will be admitted to MICU for hypoxic respiratory failure and increased work of breathing. CTA PE study is still pending at time of evaluation since patient is getting HD.     Hospital/ICU Course:  2/12:  Patient admitted for hypoxic respiratory failure.  Echocardiogram revealed new low LVEF with elevated troponins.  Cardiology consulted.  2/13:  Patient developed AFib with RVR this morning--started amiodarone infusion.  Restarted on SLED overnight.    Interval History/Significant Events:  Patient developed AFib with RVR this morning and started on amiodarone infusion.  Started on SLED overnight.    Review of Systems   Respiratory:  Positive for shortness of breath.    Cardiovascular:  Negative for chest pain.   All other systems reviewed and are negative.    Objective:     Vital Signs (Most Recent):  Temp: 98.7 °F (37.1 °C) (02/13/24 0300)  Pulse: 88 (02/13/24 0645)  Resp: 19 (02/13/24 0645)  BP: 100/63 (02/13/24 0630)  SpO2: 100 % (02/13/24 0645) Vital Signs (24h Range):  Temp:  [98 °F (36.7 °C)-98.9 °F (37.2 °C)] 98.7 °F (37.1 °C)  Pulse:  [] 88  Resp:  [17-50] 19  SpO2:  [88 %-100 %] 100 %  BP: ()/(56-98) 100/63   Weight: 67.6 kg (149 lb 0.5 oz)  Body mass index is 24.8 kg/m².      Intake/Output Summary (Last 24 hours) at 2/13/2024 0728  Last data filed at 2/13/2024 0500  Gross per 24 hour   Intake 2996.16 ml   Output 2643 ml   Net 353.16 ml          Physical Exam  Vitals and nursing note reviewed.   Constitutional:       General: She is not in acute distress.     Appearance: She is normal weight. She is not ill-appearing, toxic-appearing or diaphoretic.   HENT:      Head: Normocephalic and atraumatic.      Right Ear: External ear normal.      Left Ear: External ear normal.      Nose: Nose normal.   Cardiovascular:      Rate and Rhythm: Tachycardia present. Rhythm irregular.      Pulses: Normal pulses.      Heart sounds: Normal  heart sounds. No murmur heard.     No friction rub. No gallop.   Pulmonary:      Comments: Moderately increased respiratory effort with bilateral crackles  Abdominal:      General: Abdomen is flat. Bowel sounds are normal. There is no distension.      Palpations: Abdomen is soft.      Tenderness: There is no abdominal tenderness. There is no guarding or rebound.   Musculoskeletal:         General: Swelling present. No tenderness. Normal range of motion.   Skin:     General: Skin is warm and dry.      Coloration: Skin is not jaundiced or pale.   Neurological:      General: No focal deficit present.      Mental Status: She is alert and oriented to person, place, and time. Mental status is at baseline.   Psychiatric:         Mood and Affect: Mood normal.         Behavior: Behavior normal.         Thought Content: Thought content normal.         Judgment: Judgment normal.            Vents:  Oxygen Concentration (%): 32 (02/13/24 0019)  Lines/Drains/Airways       Central Venous Catheter Line  Duration             Permacath right subclavian -- days         Hemodialysis Catheter 01/25/23 1501 right internal jugular 383 days              Peripheral Intravenous Line  Duration                  Hemodialysis AV Fistula 09/01/23 0800 Left forearm 165 days         Peripheral IV - Single Lumen 02/12/24 0207 18 G Anterior;Proximal;Right Forearm 1 day         Peripheral IV - Single Lumen 02/12/24 1741 22 G;1 in Anterior;Right Wrist <1 day                  Significant Labs:    CBC/Anemia Profile:  Recent Labs   Lab 02/12/24  0115 02/12/24  0556 02/13/24  0334   WBC 11.47 12.03 11.06   HGB 10.5* 10.6* 9.2*   HCT 33.0* 33.4* 29.6*    207 178   MCV 94 94 96   RDW 23.0* 22.6* 22.6*        Chemistries:  Recent Labs   Lab 02/12/24  0115 02/12/24  0556 02/12/24  2120 02/13/24  0334   * 135* 131* 131*  131*   K 3.8 3.6 4.1 4.3  4.3   CL 93* 97 99 97  97   CO2 24 25 24 23  23   BUN 34* 21 18 9  9   CREATININE 4.0* 2.6* 2.2*  1.1  1.1   CALCIUM 9.5 8.9 8.2* 8.1*  8.1*   ALBUMIN 2.5* 2.5* 2.3* 2.1*  2.1*   PROT 7.2 7.5  --  6.4   BILITOT 0.4 0.3  --  0.3   ALKPHOS 101 101  --  93   ALT 7* 8*  --  10   AST 17 23  --  29   MG  --  1.8 1.8 2.1  2.1   PHOS  --  1.6* 1.6* 3.7  3.7       All pertinent labs within the past 24 hours have been reviewed.    Significant Imaging:  I have reviewed all pertinent imaging results/findings within the past 24 hours.    ABG  Recent Labs   Lab 02/12/24  0552 02/12/24  1703   PH 7.443  --    PO2 16* 28*   PCO2 44.1  --    HCO3 30.1*  --    BE 6*  --      Assessment/Plan:     Psychiatric  Generalized anxiety disorder  - Continue home venlafaxine    Pulmonary  * Acute hypoxemic respiratory failure  Patient with Hypoxic Respiratory failure which is Acute on chronic.  she is on home oxygen at 2 LPM. Supplemental oxygen was provided and noted- Oxygen Concentration (%):  [32] 32    .   Signs/symptoms of respiratory failure include- tachypnea, increased work of breathing, respiratory distress, and use of accessory muscles. Contributing diagnoses includes - Pleural effusion Labs and images were reviewed. Patient Has recent ABG, which has been reviewed. Will treat underlying causes and adjust management of respiratory failure as follows-     Patient without CHF on most recent Echo (01/12/24) which demonstrated EF 60-65%, no significant valvulopathy, borderline pulm HTN. On torsemide at baseline. Patient reports having some high sodium foods lately. High suspicion for hypervolemia given BNP elevation, though this may be confounded by CKD.     - Appreciated emergent HD by Nephro for volume management  - Admitted on BiPAP but weaned to nasal cannula 5 liters/minute after HD  - Repeat echocardiogram with new LVEF 15-20% with concerns for hypoperfusion  - Cardiology consulted    Parapneumonic effusion  On Cefpodoxime and Flagyl until planned end date of 02/15/24 per ID recs. Given recent hospitalization and  re-admission will start broad spectrum Abx.     - f/u Bcx, de-escalate pending cultures    Cardiac/Vascular  New onset of congestive heart failure  Patient is identified as having Systolic (HFrEF) heart failure that is Acute. CHF is currently uncontrolled due to Rales/crackles on pulmonary exam and Pulmonary edema/pleural effusion on CXR. Latest ECHO performed and demonstrates- Results for orders placed during the hospital encounter of 02/12/24    Echo    Interpretation Summary    Left Ventricle: The left ventricle is normal in size. Normal wall thickness. Regional wall motion abnormalities present. See diagram for wall motion findings. There is severely reduced systolic function with a visually estimated ejection fraction of 15 - 20%. Ejection fraction by visual approximation is 15%. Unable to assess diastolic function due to atrial fibrillation.    Right Ventricle: Normal right ventricular cavity size. Wall thickness is normal. Right ventricle wall motion  is normal. Systolic function is normal.    Left Atrium: Left atrium is severely dilated.    Right Atrium: Right atrium is mildly dilated.    Aortic Valve: There is moderate aortic valve sclerosis. Mildly restricted motion.    Mitral Valve: There is moderate to severe regurgitation.    Tricuspid Valve: There is mild regurgitation.    IVC/SVC: Intermediate venous pressure at 8 mmHg.    Pericardium: There is a trivial effusion. Left pleural effusion.  . Continue Beta Blocker, ACE/ARB, and ARNI and monitor clinical status closely. Monitor on telemetry. Patient is on CHF pathway.  Monitor strict Is&Os and daily weights.  Place on fluid restriction of 1.5 L. Cardiology has been consulted. Continue to stress to patient importance of self efficacy and  on diet for CHF. Last BNP reviewed- and noted below   Recent Labs   Lab 02/12/24  0115   BNP 2,754*       Atrial fibrillation  Continue home med lopressor. No therapeutic anticoagulation for now given GIB during  previous hospitalization    - 2/13: Developed AFib with RVR and started on amiodarone infusion    Hyperlipidemia, mixed  - Continue home statin    Renal/  Chronic kidney disease-mineral and bone disorder  - Continue home Sevelamer    ESRD (end stage renal disease)  Patient reports making small volumes of urine. Last HD on Saturday     - Nephro consulted for HD needs    Oncology  Anemia in ESRD (end-stage renal disease)  Has anemia of ESRD. Goal Hemoglobin >10    Endocrine  Type 2 diabetes mellitus with diabetic polyneuropathy, without long-term current use of insulin  Last A1C 4.9    - LD SSI, add scheduled detemir and aspart if needed    GI  GIB (gastrointestinal bleeding)  - Recent GIB on last admission with GI recommending 8 week course of Protonix s/p EGD with nonbleeding gastric ulcer. PPI end date 02/23/24 and follow up EGD planned at that time. Continue PPI while inpatient.               Critical Care Time: 35 minutes  Critical secondary to Patient has a condition that poses threat to life and bodily function: Severe Respiratory Distress      Critical care was time spent personally by me on the following activities: development of treatment plan with patient or surrogate and bedside caregivers, discussions with consultants, evaluation of patient's response to treatment, examination of patient, ordering and performing treatments and interventions, ordering and review of laboratory studies, ordering and review of radiographic studies, pulse oximetry, re-evaluation of patient's condition. This critical care time did not overlap with that of any other provider or involve time for any procedures.         Meghan Trujillo M.D.  Rapid Response/Critical Care  Department of Pulmonary Medicine  [facility]        N.B.: Portions of this note was dictated using M*Modal Fluency Direct--there may be voice recognition errors occasionally missed on review.

## 2024-02-13 NOTE — PLAN OF CARE
Recommendations    1. Continue gluten free diet with texture per SLP     - Add [place] restriction per pt request    2. modify ONS to [employer] renal TID      3. RD to monitor and follow    Goals: Meet % EEN, EPN by RD f/u  Nutrition Goal Status: new  Communication of RD Recs:  (POC)

## 2024-02-13 NOTE — ASSESSMENT & PLAN NOTE
Patient with Hypoxic Respiratory failure which is Acute on chronic.  she is on home oxygen at 2 LPM. Supplemental oxygen was provided and noted- Oxygen Concentration (%):  [32] 32    .   Signs/symptoms of respiratory failure include- tachypnea, increased work of breathing, respiratory distress, and use of accessory muscles. Contributing diagnoses includes - Pleural effusion Labs and images were reviewed. Patient Has recent ABG, which has been reviewed. Will treat underlying causes and adjust management of respiratory failure as follows-     Patient without CHF on most recent Echo (01/12/24) which demonstrated EF 60-65%, no significant valvulopathy, borderline pulm HTN. On torsemide at baseline. Patient reports having some high sodium foods lately. High suspicion for hypervolemia given BNP elevation, though this may be confounded by CKD.     - Appreciated emergent HD by Nephro for volume management  - Admitted on BiPAP but weaned to nasal cannula 5 liters/minute after HD  - Repeat echocardiogram with new LVEF 15-20% with concerns for hypoperfusion  - Cardiology consulted

## 2024-02-13 NOTE — HOSPITAL COURSE
2/12:  Patient admitted for hypoxic respiratory failure.  Echocardiogram revealed new low LVEF with elevated troponins.  Cardiology consulted.  2/13:  Patient developed AFib with RVR this morning--started amiodarone infusion.  Restarted on SLED overnight.  CT findings with a couple fluid collections in the right paraesophageal space and azygoesophageal recess.  2/14:  CT Surgery consulted and recommended IR consultation for evaluation for drainage; unfortunately, no safe window for approach.  Interventional Cardiology deferring angiography due to respiratory status.  Palliative Care consulted.  10 hours SLED overnight with removal of 2.1 L. A couple episodes of RVR overnight--increasing metoprolol this morning.  2/15:  Clotted off on CRRT overnight and restarted.  Thoracentesis performed with removal of 1.45L--sent for analysis.  Developed re-expansion pulmonary edema and started on Airvo.  2/16:  Airvo weaned down to 50L40% this morning.  Transudative effusion on analysis, remaining studies pending.  Started on vasopressor support overnight to support volume removal with CRRT.  2/17:  Weaned off Airvo overnight, now on HFNC at 12 L/min.  On SLED overnight with a couple episodes of clotting off.  Tolerated SLED off norepinephrine.    2/18: Weaned to 4L this am. Feels weak and tired today  2/19: No significant changes

## 2024-02-13 NOTE — ASSESSMENT & PLAN NOTE
Continue home med lopressor. No therapeutic anticoagulation for now given GIB during previous hospitalization    - 2/13: Developed AFib with RVR and started on amiodarone infusion

## 2024-02-13 NOTE — NURSING
At approximately 1600 Pt received via stretcher from ED on 2 L NC and no gtts. AAO. No acute distress. Tachypnea. See images for wounds. Extremities cool but unchanged from previous admission. Sinus Tach. CVP 11; Lactic 1.8; SVO2 56%; Troponin elevated. No further complaints. Awaiting initiation of 10 hour SLED treatment. Spouse updated on admission and plan of care. Private sitter at bedside. Bed locked in low position. Call light in reach.

## 2024-02-13 NOTE — SUBJECTIVE & OBJECTIVE
Interval History: She is on 5L NC, HR ranging in the 80-90. Soft BP 90-100s/50s-70s, on 5L NC. Net positive 353. SvO2 was 68, CVP 15.  This morning patient continues to complain of SOB. However, no chest discomfort, lightheadedness, chest pain, or palpitations.      Review of Systems   Constitutional: Negative.   HENT: Negative.     Eyes: Negative.    Cardiovascular:  Positive for dyspnea on exertion.   Respiratory: Negative.     Endocrine: Negative.    Hematologic/Lymphatic: Negative.    Musculoskeletal: Negative.    Gastrointestinal: Negative.    Genitourinary: Negative.    Neurological: Negative.      Objective:     Vital Signs (Most Recent):  Temp: 97.7 °F (36.5 °C) (02/13/24 0730)  Pulse: (!) 130 (02/13/24 0845)  Resp: (!) 28 (02/13/24 0845)  BP: 99/73 (02/13/24 0845)  SpO2: 98 % (02/13/24 0845) Vital Signs (24h Range):  Temp:  [97.7 °F (36.5 °C)-98.9 °F (37.2 °C)] 97.7 °F (36.5 °C)  Pulse:  [] 130  Resp:  [17-50] 28  SpO2:  [88 %-100 %] 98 %  BP: ()/(52-98) 99/73     Weight: 67.6 kg (149 lb 0.5 oz)  Body mass index is 24.8 kg/m².     SpO2: 98 %         Intake/Output Summary (Last 24 hours) at 2/13/2024 0954  Last data filed at 2/13/2024 0800  Gross per 24 hour   Intake 2932.28 ml   Output 2643 ml   Net 289.28 ml       Lines/Drains/Airways       Central Venous Catheter Line  Duration             Permacath right subclavian -- days         Hemodialysis Catheter 01/25/23 1501 right internal jugular 383 days              Peripheral Intravenous Line  Duration                  Hemodialysis AV Fistula 09/01/23 0800 Left forearm 165 days         Peripheral IV - Single Lumen 02/12/24 0207 18 G Anterior;Proximal;Right Forearm 1 day         Peripheral IV - Single Lumen 02/12/24 1741 22 G;1 in Anterior;Right Wrist <1 day                       Physical Exam  Constitutional:       Appearance: She is ill-appearing and toxic-appearing.   HENT:      Head: Normocephalic.   Cardiovascular:      Rate and Rhythm:  Regular rhythm. Tachycardia present.      Pulses: Normal pulses.      Comments: Gallop present  Pulmonary:      Effort: Pulmonary effort is normal.      Breath sounds: Rhonchi present.      Comments: Mild crackles present at the bases  Abdominal:      General: Abdomen is flat. Bowel sounds are normal.      Palpations: Abdomen is soft.   Musculoskeletal:         General: Normal range of motion.      Cervical back: Normal range of motion.   Skin:     General: Skin is warm.      Capillary Refill: Capillary refill takes less than 2 seconds.   Neurological:      General: No focal deficit present.      Mental Status: She is alert and oriented to person, place, and time.            Significant Labs: All pertinent lab results from the last 24 hours have been reviewed.    Significant Imaging: Echocardiogram: Transthoracic echo (TTE) complete (Cupid Only):   Results for orders placed or performed during the hospital encounter of 02/12/24   Echo   Result Value Ref Range    RA Width 3.99 cm    Left Atrium Major Axis 5.32 cm    Left Atrium Minor Axis 4.47 cm    RA Major Axis 5.10 cm    LV Diastolic Volume 103.69 mL    LV Systolic Volume 66.01 mL    Ao VTI 12.80 cm    Ao peak nadiya 1.11 m/s    LVOT peak VTI 12.14 cm    LVOT peak nadiya 1.01 m/s    LVOT diameter 1.90 cm    AV mean gradient 4 mmHg    TAPSE 1.34 cm    RVDD 2.27 cm    LA size 3.69 cm    Ascending aorta 3.35 cm    STJ 2.91 cm    Sinus 2.98 cm    LVIDs 3.90 2.1 - 4.0 cm    Posterior Wall 1.07 0.6 - 1.1 cm    IVS 0.80 0.6 - 1.1 cm    LVIDd 4.73 3.5 - 6.0 cm    LA WIDTH 4.15 cm    FS 18 (A) 28 - 44 %    LA volume 63.24 cm3    LV mass 151.75 g    Left Ventricle Relative Wall Thickness 0.45 cm    AV valve area 2.69 cm²    AV Velocity Ratio 0.91     AV index (prosthetic) 0.95     LVOT area 2.8 cm2    LVOT stroke volume 34.40 cm3    AV peak gradient 5 mmHg    IVANNA by Velocity Ratio 2.58 cm²    EF 15 %    Est. RA pres 8 mmHg    Narrative      Left Ventricle: The left ventricle is  normal in size. Normal wall   thickness. Regional wall motion abnormalities present. See diagram for   wall motion findings. There is severely reduced systolic function with a   visually estimated ejection fraction of 15 - 20%. Ejection fraction by   visual approximation is 15%. Unable to assess diastolic function due to   atrial fibrillation.    Right Ventricle: Normal right ventricular cavity size. Wall thickness   is normal. Right ventricle wall motion  is normal. Systolic function is   normal.    Left Atrium: Left atrium is severely dilated.    Right Atrium: Right atrium is mildly dilated.    Aortic Valve: There is moderate aortic valve sclerosis. Mildly   restricted motion.    Mitral Valve: There is moderate to severe regurgitation.    Tricuspid Valve: There is mild regurgitation.    IVC/SVC: Intermediate venous pressure at 8 mmHg.    Pericardium: There is a trivial effusion. Left pleural effusion.      and X-Ray: CXR: X-Ray Chest PA and Lateral (CXR): No results found for this visit on 02/12/24.

## 2024-02-13 NOTE — ASSESSMENT & PLAN NOTE
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  She was on meropenem, then transitioned to ceftriaxone and metronidazole.  She was ultimately discharged on cefpodoxime and metronidazole with a plan to complete 4 weeks of antibiotics on February 15.  She is now re-admitted with respiratory distress.  ID consulted to assist with her care.    CT scan reviewed.  Per radiology read, there is a small amount of fluid and gas with associated inflammatory changes in the azygoesophageal recess.  Recommend asking CT surgery to review and rule out a small esophageal leak or fistula.  Okay to continue zosyn and vancomycin.

## 2024-02-13 NOTE — PROGRESS NOTES
[facility]  Cardiology  Progress Note    Patient Name: Lily Green  MRN: [id number]  Admission Date: 2/12/2024  Hospital Length of Stay: 1 days  Code Status: Full Code   Attending Physician: Meghan Trujillo MD   Primary Care Physician: Pavel Calixto MD  Expected Discharge Date:   Principal Problem:Acute hypoxemic respiratory failure    Subjective:     Hospital Course:   No notes on file    Interval History: She is on 5L NC, HR ranging in the 80-90. Soft BP 90-100s/50s-70s, on 5L NC. Net positive 353. SvO2 was 68, CVP 15.  This morning patient continues to complain of SOB. However, no chest discomfort, lightheadedness, chest pain, or palpitations.      Review of Systems   Constitutional: Negative.   HENT: Negative.     Eyes: Negative.    Cardiovascular:  Positive for dyspnea on exertion.   Respiratory: Negative.     Endocrine: Negative.    Hematologic/Lymphatic: Negative.    Musculoskeletal: Negative.    Gastrointestinal: Negative.    Genitourinary: Negative.    Neurological: Negative.      Objective:     Vital Signs (Most Recent):  Temp: 97.7 °F (36.5 °C) (02/13/24 0730)  Pulse: (!) 130 (02/13/24 0845)  Resp: (!) 28 (02/13/24 0845)  BP: 99/73 (02/13/24 0845)  SpO2: 98 % (02/13/24 0845) Vital Signs (24h Range):  Temp:  [97.7 °F (36.5 °C)-98.9 °F (37.2 °C)] 97.7 °F (36.5 °C)  Pulse:  [] 130  Resp:  [17-50] 28  SpO2:  [88 %-100 %] 98 %  BP: ()/(52-98) 99/73     Weight: 67.6 kg (149 lb 0.5 oz)  Body mass index is 24.8 kg/m².     SpO2: 98 %         Intake/Output Summary (Last 24 hours) at 2/13/2024 0954  Last data filed at 2/13/2024 0800  Gross per 24 hour   Intake 2932.28 ml   Output 2643 ml   Net 289.28 ml       Lines/Drains/Airways       Central Venous Catheter Line  Duration             Permacath right subclavian -- days         Hemodialysis Catheter 01/25/23 1501 right internal jugular 383 days              Peripheral Intravenous Line  Duration                   Hemodialysis AV Fistula 09/01/23 0800 Left forearm 165 days         Peripheral IV - Single Lumen 02/12/24 0207 18 G Anterior;Proximal;Right Forearm 1 day         Peripheral IV - Single Lumen 02/12/24 1741 22 G;1 in Anterior;Right Wrist <1 day                       Physical Exam  Constitutional:       Appearance: She is ill-appearing and toxic-appearing.   HENT:      Head: Normocephalic.   Cardiovascular:      Rate and Rhythm: Regular rhythm. Tachycardia present.      Pulses: Normal pulses.      Comments: Gallop present  Pulmonary:      Effort: Pulmonary effort is normal.      Breath sounds: Rhonchi present.      Comments: Mild crackles present at the bases  Abdominal:      General: Abdomen is flat. Bowel sounds are normal.      Palpations: Abdomen is soft.   Musculoskeletal:         General: Normal range of motion.      Cervical back: Normal range of motion.   Skin:     General: Skin is warm.      Capillary Refill: Capillary refill takes less than 2 seconds.   Neurological:      General: No focal deficit present.      Mental Status: She is alert and oriented to person, place, and time.            Significant Labs: All pertinent lab results from the last 24 hours have been reviewed.    Significant Imaging: Echocardiogram: Transthoracic echo (TTE) complete (Cupid Only):   Results for orders placed or performed during the hospital encounter of 02/12/24   Echo   Result Value Ref Range    RA Width 3.99 cm    Left Atrium Major Axis 5.32 cm    Left Atrium Minor Axis 4.47 cm    RA Major Axis 5.10 cm    LV Diastolic Volume 103.69 mL    LV Systolic Volume 66.01 mL    Ao VTI 12.80 cm    Ao peak nadiya 1.11 m/s    LVOT peak VTI 12.14 cm    LVOT peak nadiya 1.01 m/s    LVOT diameter 1.90 cm    AV mean gradient 4 mmHg    TAPSE 1.34 cm    RVDD 2.27 cm    LA size 3.69 cm    Ascending aorta 3.35 cm    STJ 2.91 cm    Sinus 2.98 cm    LVIDs 3.90 2.1 - 4.0 cm    Posterior Wall 1.07 0.6 - 1.1 cm    IVS 0.80 0.6 - 1.1 cm    LVIDd 4.73 3.5 -  6.0 cm    LA WIDTH 4.15 cm    FS 18 (A) 28 - 44 %    LA volume 63.24 cm3    LV mass 151.75 g    Left Ventricle Relative Wall Thickness 0.45 cm    AV valve area 2.69 cm²    AV Velocity Ratio 0.91     AV index (prosthetic) 0.95     LVOT area 2.8 cm2    LVOT stroke volume 34.40 cm3    AV peak gradient 5 mmHg    IVANNA by Velocity Ratio 2.58 cm²    EF 15 %    Est. RA pres 8 mmHg    Narrative      Left Ventricle: The left ventricle is normal in size. Normal wall   thickness. Regional wall motion abnormalities present. See diagram for   wall motion findings. There is severely reduced systolic function with a   visually estimated ejection fraction of 15 - 20%. Ejection fraction by   visual approximation is 15%. Unable to assess diastolic function due to   atrial fibrillation.    Right Ventricle: Normal right ventricular cavity size. Wall thickness   is normal. Right ventricle wall motion  is normal. Systolic function is   normal.    Left Atrium: Left atrium is severely dilated.    Right Atrium: Right atrium is mildly dilated.    Aortic Valve: There is moderate aortic valve sclerosis. Mildly   restricted motion.    Mitral Valve: There is moderate to severe regurgitation.    Tricuspid Valve: There is mild regurgitation.    IVC/SVC: Intermediate venous pressure at 8 mmHg.    Pericardium: There is a trivial effusion. Left pleural effusion.      and X-Ray: CXR: X-Ray Chest PA and Lateral (CXR): No results found for this visit on 02/12/24.  Assessment and Plan:     Atrial flutter  Presenting rhythm was atrial flutter with 2:1 conduction with spontaneously converted.    She also has history of atrial fibrillation.  Currently not on anticoagulation because of previous GI bleed in the ulcers on EGD.     New onset of congestive heart failure  Patient presents with acute onset of respiratory failure and was found to have a drop in her EF to 15%.  Presenting rhythm was atrial flutter with 2:1 now she is now sinus tachycardic  Also has  elevated troponin that trended up to 2.  Patient's lower extremities are cold and appears mottled which is concerning for shock  SvO2 checked from subclavian line is 56   CO/CI 4.2/2.3    lactic acid 1.8    Svo2 today 68, CVP 15.     Plan  Recommend to keep her on metoprolol succinate to 25 mg daily and titrate up  Recommend to start patient on valsartan 40 mg b.i.d. and titrate up as tolerated  Patient will need diuresis today   Patient will need ischemic workup so please consult intervention cardiology for left heart catheterization once she is stable from respiratory standpoint.             VTE Risk Mitigation (From admission, onward)           Ordered     heparin (porcine) injection 5,000 Units  Every 8 hours         02/12/24 0416     IP VTE HIGH RISK PATIENT  Once         02/12/24 0416     Place sequential compression device  Until discontinued         02/12/24 0416     heparin (porcine) injection 1,000 Units  As needed (PRN)         02/12/24 0229                    Doreen Archer MD  Cardiology  [facility] - Surgical Intensive Care  I have personally taken the history and examined the patient and agree with the resident's note as stated above.  Interventional Consult for cath and dialysis for fluid removal and EP consult to later ablate flutter.Otherwise med treatment for cardiomyopathy as discussed yesterday.

## 2024-02-14 LAB
ALBUMIN SERPL BCP-MCNC: 2 G/DL (ref 3.5–5.2)
ALBUMIN SERPL BCP-MCNC: 2 G/DL (ref 3.5–5.2)
ALBUMIN SERPL BCP-MCNC: 2.1 G/DL (ref 3.5–5.2)
ALBUMIN SERPL BCP-MCNC: 2.1 G/DL (ref 3.5–5.2)
ALLENS TEST: ABNORMAL
ALP SERPL-CCNC: 88 U/L (ref 55–135)
ALT SERPL W/O P-5'-P-CCNC: 9 U/L (ref 10–44)
ANION GAP SERPL CALC-SCNC: 10 MMOL/L (ref 8–16)
ANION GAP SERPL CALC-SCNC: 10 MMOL/L (ref 8–16)
ANION GAP SERPL CALC-SCNC: 11 MMOL/L (ref 8–16)
ANION GAP SERPL CALC-SCNC: 11 MMOL/L (ref 8–16)
ASCENDING AORTA: 3.11 CM
AST SERPL-CCNC: 18 U/L (ref 10–40)
BASOPHILS # BLD AUTO: 0.01 K/UL (ref 0–0.2)
BASOPHILS NFR BLD: 0.1 % (ref 0–1.9)
BILIRUB SERPL-MCNC: 0.3 MG/DL (ref 0.1–1)
BSA FOR ECHO PROCEDURE: 1.76 M2
BUN SERPL-MCNC: 12 MG/DL (ref 8–23)
BUN SERPL-MCNC: 12 MG/DL (ref 8–23)
BUN SERPL-MCNC: 5 MG/DL (ref 8–23)
BUN SERPL-MCNC: 5 MG/DL (ref 8–23)
CALCIUM SERPL-MCNC: 7.4 MG/DL (ref 8.7–10.5)
CALCIUM SERPL-MCNC: 7.4 MG/DL (ref 8.7–10.5)
CALCIUM SERPL-MCNC: 8.5 MG/DL (ref 8.7–10.5)
CALCIUM SERPL-MCNC: 8.5 MG/DL (ref 8.7–10.5)
CHLORIDE SERPL-SCNC: 97 MMOL/L (ref 95–110)
CHLORIDE SERPL-SCNC: 97 MMOL/L (ref 95–110)
CHLORIDE SERPL-SCNC: 99 MMOL/L (ref 95–110)
CHLORIDE SERPL-SCNC: 99 MMOL/L (ref 95–110)
CO2 SERPL-SCNC: 17 MMOL/L (ref 23–29)
CO2 SERPL-SCNC: 17 MMOL/L (ref 23–29)
CO2 SERPL-SCNC: 22 MMOL/L (ref 23–29)
CO2 SERPL-SCNC: 22 MMOL/L (ref 23–29)
CREAT SERPL-MCNC: 0.8 MG/DL (ref 0.5–1.4)
CREAT SERPL-MCNC: 0.8 MG/DL (ref 0.5–1.4)
CREAT SERPL-MCNC: 1.3 MG/DL (ref 0.5–1.4)
CREAT SERPL-MCNC: 1.3 MG/DL (ref 0.5–1.4)
CV ECHO LV RWT: 0.32 CM
DELSYS: ABNORMAL
DIFFERENTIAL METHOD BLD: ABNORMAL
DOP CALC LVOT AREA: 3.2 CM2
DOP CALC LVOT DIAMETER: 2.02 CM
DOP CALC LVOT PEAK VEL: 0.69 M/S
DOP CALC LVOT STROKE VOLUME: 40.14 CM3
DOP CALCLVOT PEAK VEL VTI: 12.53 CM
E WAVE DECELERATION TIME: 177.04 MSEC
E/A RATIO: 1.39
E/E' RATIO: 9.79 M/S
ECHO LV POSTERIOR WALL: 0.8 CM (ref 0.6–1.1)
EOSINOPHIL # BLD AUTO: 0 K/UL (ref 0–0.5)
EOSINOPHIL NFR BLD: 0 % (ref 0–8)
ERYTHROCYTE [DISTWIDTH] IN BLOOD BY AUTOMATED COUNT: 22.5 % (ref 11.5–14.5)
EST. GFR  (NO RACE VARIABLE): 43.4 ML/MIN/1.73 M^2
EST. GFR  (NO RACE VARIABLE): 43.4 ML/MIN/1.73 M^2
EST. GFR  (NO RACE VARIABLE): >60 ML/MIN/1.73 M^2
EST. GFR  (NO RACE VARIABLE): >60 ML/MIN/1.73 M^2
FRACTIONAL SHORTENING: 4 % (ref 28–44)
GLUCOSE SERPL-MCNC: 214 MG/DL (ref 70–110)
GLUCOSE SERPL-MCNC: 214 MG/DL (ref 70–110)
GLUCOSE SERPL-MCNC: 299 MG/DL (ref 70–110)
GLUCOSE SERPL-MCNC: 299 MG/DL (ref 70–110)
HCT VFR BLD AUTO: 27.2 % (ref 37–48.5)
HGB BLD-MCNC: 8.4 G/DL (ref 12–16)
IMM GRANULOCYTES # BLD AUTO: 0.05 K/UL (ref 0–0.04)
IMM GRANULOCYTES NFR BLD AUTO: 0.5 % (ref 0–0.5)
INTERVENTRICULAR SEPTUM: 0.68 CM (ref 0.6–1.1)
LA MAJOR: 5.74 CM
LA MINOR: 5.68 CM
LA WIDTH: 4.19 CM
LEFT ATRIUM SIZE: 3.87 CM
LEFT ATRIUM VOLUME INDEX MOD: 47.4 ML/M2
LEFT ATRIUM VOLUME INDEX: 45 ML/M2
LEFT ATRIUM VOLUME MOD: 83 CM3
LEFT ATRIUM VOLUME: 78.7 CM3
LEFT INTERNAL DIMENSION IN SYSTOLE: 4.8 CM (ref 2.1–4)
LEFT VENTRICLE DIASTOLIC VOLUME INDEX: 65.52 ML/M2
LEFT VENTRICLE DIASTOLIC VOLUME: 114.66 ML
LEFT VENTRICLE MASS INDEX: 70 G/M2
LEFT VENTRICLE SYSTOLIC VOLUME INDEX: 53 ML/M2
LEFT VENTRICLE SYSTOLIC VOLUME: 92.83 ML
LEFT VENTRICULAR INTERNAL DIMENSION IN DIASTOLE: 5 CM (ref 3.5–6)
LEFT VENTRICULAR MASS: 122.99 G
LV LATERAL E/E' RATIO: 7.75 M/S
LV SEPTAL E/E' RATIO: 13.29 M/S
LYMPHOCYTES # BLD AUTO: 1.1 K/UL (ref 1–4.8)
LYMPHOCYTES NFR BLD: 10.9 % (ref 18–48)
MAGNESIUM SERPL-MCNC: 1.9 MG/DL (ref 1.6–2.6)
MAGNESIUM SERPL-MCNC: 1.9 MG/DL (ref 1.6–2.6)
MAGNESIUM SERPL-MCNC: 2.4 MG/DL (ref 1.6–2.6)
MAGNESIUM SERPL-MCNC: 2.4 MG/DL (ref 1.6–2.6)
MCH RBC QN AUTO: 29.9 PG (ref 27–31)
MCHC RBC AUTO-ENTMCNC: 30.9 G/DL (ref 32–36)
MCV RBC AUTO: 97 FL (ref 82–98)
MONOCYTES # BLD AUTO: 1 K/UL (ref 0.3–1)
MONOCYTES NFR BLD: 10.3 % (ref 4–15)
MV PEAK A VEL: 0.67 M/S
MV PEAK E VEL: 0.93 M/S
MV STENOSIS PRESSURE HALF TIME: 51.34 MS
MV VALVE AREA P 1/2 METHOD: 4.29 CM2
NEUTROPHILS # BLD AUTO: 7.7 K/UL (ref 1.8–7.7)
NEUTROPHILS NFR BLD: 78.2 % (ref 38–73)
NRBC BLD-RTO: 0 /100 WBC
OHS QRS DURATION: 82 MS
OHS QTC CALCULATION: 399 MS
PHOSPHATE SERPL-MCNC: 3 MG/DL (ref 2.7–4.5)
PHOSPHATE SERPL-MCNC: 3 MG/DL (ref 2.7–4.5)
PHOSPHATE SERPL-MCNC: 5.2 MG/DL (ref 2.7–4.5)
PHOSPHATE SERPL-MCNC: 5.2 MG/DL (ref 2.7–4.5)
PLATELET # BLD AUTO: 188 K/UL (ref 150–450)
PMV BLD AUTO: 10.9 FL (ref 9.2–12.9)
PO2 BLDA: 34 MMHG (ref 40–60)
POC SATURATED O2: 65 % (ref 95–100)
POCT GLUCOSE: 155 MG/DL (ref 70–110)
POCT GLUCOSE: 197 MG/DL (ref 70–110)
POCT GLUCOSE: 207 MG/DL (ref 70–110)
POCT GLUCOSE: 230 MG/DL (ref 70–110)
POCT GLUCOSE: 247 MG/DL (ref 70–110)
POCT GLUCOSE: 319 MG/DL (ref 70–110)
POTASSIUM SERPL-SCNC: 4.2 MMOL/L (ref 3.5–5.1)
POTASSIUM SERPL-SCNC: 4.2 MMOL/L (ref 3.5–5.1)
POTASSIUM SERPL-SCNC: 4.8 MMOL/L (ref 3.5–5.1)
POTASSIUM SERPL-SCNC: 4.8 MMOL/L (ref 3.5–5.1)
PROT SERPL-MCNC: 5.7 G/DL (ref 6–8.4)
RA MAJOR: 4.46 CM
RA PRESSURE ESTIMATED: 3 MMHG
RA WIDTH: 3.7 CM
RBC # BLD AUTO: 2.81 M/UL (ref 4–5.4)
RIGHT VENTRICULAR END-DIASTOLIC DIMENSION: 3.09 CM
SAMPLE: ABNORMAL
SINUS: 3.02 CM
SITE: ABNORMAL
SODIUM SERPL-SCNC: 127 MMOL/L (ref 136–145)
SODIUM SERPL-SCNC: 127 MMOL/L (ref 136–145)
SODIUM SERPL-SCNC: 129 MMOL/L (ref 136–145)
SODIUM SERPL-SCNC: 129 MMOL/L (ref 136–145)
STJ: 2.66 CM
TDI LATERAL: 0.12 M/S
TDI SEPTAL: 0.07 M/S
TDI: 0.1 M/S
TRICUSPID ANNULAR PLANE SYSTOLIC EXCURSION: 1.87 CM
VANCOMYCIN SERPL-MCNC: 11.2 UG/ML
WBC # BLD AUTO: 9.83 K/UL (ref 3.9–12.7)
Z-SCORE OF LEFT VENTRICULAR DIMENSION IN END DIASTOLE: 0.32
Z-SCORE OF LEFT VENTRICULAR DIMENSION IN END SYSTOLE: 3.73

## 2024-02-14 PROCEDURE — 63600175 PHARM REV CODE 636 W HCPCS

## 2024-02-14 PROCEDURE — 25000003 PHARM REV CODE 250

## 2024-02-14 PROCEDURE — 25000242 PHARM REV CODE 250 ALT 637 W/ HCPCS: Performed by: NURSE PRACTITIONER

## 2024-02-14 PROCEDURE — 93005 ELECTROCARDIOGRAM TRACING: CPT

## 2024-02-14 PROCEDURE — 25000003 PHARM REV CODE 250: Performed by: STUDENT IN AN ORGANIZED HEALTH CARE EDUCATION/TRAINING PROGRAM

## 2024-02-14 PROCEDURE — 84100 ASSAY OF PHOSPHORUS: CPT | Performed by: INTERNAL MEDICINE

## 2024-02-14 PROCEDURE — 94761 N-INVAS EAR/PLS OXIMETRY MLT: CPT

## 2024-02-14 PROCEDURE — 99900035 HC TECH TIME PER 15 MIN (STAT)

## 2024-02-14 PROCEDURE — 63600175 PHARM REV CODE 636 W HCPCS: Performed by: INTERNAL MEDICINE

## 2024-02-14 PROCEDURE — 83735 ASSAY OF MAGNESIUM: CPT | Performed by: INTERNAL MEDICINE

## 2024-02-14 PROCEDURE — 99233 SBSQ HOSP IP/OBS HIGH 50: CPT | Mod: GC,,, | Performed by: INTERNAL MEDICINE

## 2024-02-14 PROCEDURE — 83735 ASSAY OF MAGNESIUM: CPT | Mod: 91 | Performed by: STUDENT IN AN ORGANIZED HEALTH CARE EDUCATION/TRAINING PROGRAM

## 2024-02-14 PROCEDURE — 25000003 PHARM REV CODE 250: Performed by: INTERNAL MEDICINE

## 2024-02-14 PROCEDURE — 20000000 HC ICU ROOM

## 2024-02-14 PROCEDURE — 27000221 HC OXYGEN, UP TO 24 HOURS

## 2024-02-14 PROCEDURE — 80053 COMPREHEN METABOLIC PANEL: CPT | Performed by: INTERNAL MEDICINE

## 2024-02-14 PROCEDURE — 94640 AIRWAY INHALATION TREATMENT: CPT

## 2024-02-14 PROCEDURE — 63600175 PHARM REV CODE 636 W HCPCS: Performed by: STUDENT IN AN ORGANIZED HEALTH CARE EDUCATION/TRAINING PROGRAM

## 2024-02-14 PROCEDURE — 80202 ASSAY OF VANCOMYCIN: CPT | Performed by: INTERNAL MEDICINE

## 2024-02-14 PROCEDURE — 80069 RENAL FUNCTION PANEL: CPT | Performed by: STUDENT IN AN ORGANIZED HEALTH CARE EDUCATION/TRAINING PROGRAM

## 2024-02-14 PROCEDURE — 85025 COMPLETE CBC W/AUTO DIFF WBC: CPT | Performed by: INTERNAL MEDICINE

## 2024-02-14 PROCEDURE — 93010 ELECTROCARDIOGRAM REPORT: CPT | Mod: ,,, | Performed by: INTERNAL MEDICINE

## 2024-02-14 PROCEDURE — 90945 DIALYSIS ONE EVALUATION: CPT

## 2024-02-14 RX ORDER — MORPHINE SULFATE 2 MG/ML
2 INJECTION, SOLUTION INTRAMUSCULAR; INTRAVENOUS ONCE
Status: COMPLETED | OUTPATIENT
Start: 2024-02-14 | End: 2024-02-14

## 2024-02-14 RX ORDER — METOPROLOL TARTRATE 25 MG/1
25 TABLET, FILM COATED ORAL ONCE
Status: COMPLETED | OUTPATIENT
Start: 2024-02-14 | End: 2024-02-14

## 2024-02-14 RX ORDER — MAGNESIUM SULFATE HEPTAHYDRATE 40 MG/ML
2 INJECTION, SOLUTION INTRAVENOUS
Status: ACTIVE | OUTPATIENT
Start: 2024-02-14 | End: 2024-02-15

## 2024-02-14 RX ORDER — METOPROLOL TARTRATE 1 MG/ML
5 INJECTION, SOLUTION INTRAVENOUS ONCE
Status: DISCONTINUED | OUTPATIENT
Start: 2024-02-14 | End: 2024-02-14

## 2024-02-14 RX ORDER — HYDROCODONE BITARTRATE AND ACETAMINOPHEN 500; 5 MG/1; MG/1
TABLET ORAL CONTINUOUS
Status: DISCONTINUED | OUTPATIENT
Start: 2024-02-14 | End: 2024-02-15

## 2024-02-14 RX ORDER — METOPROLOL TARTRATE 25 MG/1
25 TABLET, FILM COATED ORAL 2 TIMES DAILY
Status: DISCONTINUED | OUTPATIENT
Start: 2024-02-14 | End: 2024-02-16

## 2024-02-14 RX ORDER — METOPROLOL TARTRATE 50 MG/1
50 TABLET ORAL 2 TIMES DAILY
Status: DISCONTINUED | OUTPATIENT
Start: 2024-02-14 | End: 2024-02-14

## 2024-02-14 RX ORDER — METOPROLOL TARTRATE 25 MG/1
25 TABLET, FILM COATED ORAL 2 TIMES DAILY
Status: DISCONTINUED | OUTPATIENT
Start: 2024-02-14 | End: 2024-02-14

## 2024-02-14 RX ADMIN — PANTOPRAZOLE SODIUM 40 MG: 40 TABLET, DELAYED RELEASE ORAL at 05:02

## 2024-02-14 RX ADMIN — IPRATROPIUM BROMIDE AND ALBUTEROL SULFATE 3 ML: .5; 3 SOLUTION RESPIRATORY (INHALATION) at 11:02

## 2024-02-14 RX ADMIN — DRONABINOL 5 MG: 2.5 CAPSULE ORAL at 03:02

## 2024-02-14 RX ADMIN — PANTOPRAZOLE SODIUM 40 MG: 40 TABLET, DELAYED RELEASE ORAL at 03:02

## 2024-02-14 RX ADMIN — MAGNESIUM SULFATE HEPTAHYDRATE 2 G: 40 INJECTION, SOLUTION INTRAVENOUS at 05:02

## 2024-02-14 RX ADMIN — HEPARIN SODIUM 5000 UNITS: 5000 INJECTION INTRAVENOUS; SUBCUTANEOUS at 02:02

## 2024-02-14 RX ADMIN — METOPROLOL TARTRATE 12.5 MG: 25 TABLET, FILM COATED ORAL at 10:02

## 2024-02-14 RX ADMIN — MORPHINE SULFATE 2 MG: 2 INJECTION, SOLUTION INTRAMUSCULAR; INTRAVENOUS at 11:02

## 2024-02-14 RX ADMIN — SODIUM CHLORIDE: 9 INJECTION, SOLUTION INTRAVENOUS at 03:02

## 2024-02-14 RX ADMIN — INSULIN ASPART 1 UNITS: 100 INJECTION, SOLUTION INTRAVENOUS; SUBCUTANEOUS at 10:02

## 2024-02-14 RX ADMIN — PIPERACILLIN SODIUM AND TAZOBACTAM SODIUM 4.5 G: 4; .5 INJECTION, POWDER, FOR SOLUTION INTRAVENOUS at 05:02

## 2024-02-14 RX ADMIN — PREGABALIN 75 MG: 75 CAPSULE ORAL at 04:02

## 2024-02-14 RX ADMIN — INSULIN ASPART 2 UNITS: 100 INJECTION, SOLUTION INTRAVENOUS; SUBCUTANEOUS at 12:02

## 2024-02-14 RX ADMIN — VENLAFAXINE 37.5 MG: 37.5 TABLET ORAL at 09:02

## 2024-02-14 RX ADMIN — MUPIROCIN: 20 OINTMENT TOPICAL at 04:02

## 2024-02-14 RX ADMIN — SODIUM CHLORIDE: 9 INJECTION, SOLUTION INTRAVENOUS at 06:02

## 2024-02-14 RX ADMIN — AMIODARONE HYDROCHLORIDE 0.5 MG/MIN: 50 INJECTION, SOLUTION INTRAVENOUS at 10:02

## 2024-02-14 RX ADMIN — HEPARIN SODIUM 5000 UNITS: 5000 INJECTION INTRAVENOUS; SUBCUTANEOUS at 05:02

## 2024-02-14 RX ADMIN — DRONABINOL 5 MG: 2.5 CAPSULE ORAL at 05:02

## 2024-02-14 RX ADMIN — MUPIROCIN: 20 OINTMENT TOPICAL at 10:02

## 2024-02-14 RX ADMIN — SODIUM PHOSPHATE, MONOBASIC, MONOHYDRATE AND SODIUM PHOSPHATE, DIBASIC, ANHYDROUS 30 MMOL: 142; 276 INJECTION, SOLUTION INTRAVENOUS at 01:02

## 2024-02-14 RX ADMIN — HEPARIN SODIUM 5000 UNITS: 5000 INJECTION INTRAVENOUS; SUBCUTANEOUS at 10:02

## 2024-02-14 RX ADMIN — METOPROLOL TARTRATE 25 MG: 50 TABLET, FILM COATED ORAL at 11:02

## 2024-02-14 RX ADMIN — ATORVASTATIN CALCIUM 40 MG: 40 TABLET, FILM COATED ORAL at 10:02

## 2024-02-14 NOTE — ASSESSMENT & PLAN NOTE
Patient with Hypoxic Respiratory failure which is Acute on chronic.  she is on home oxygen at 2 LPM. Supplemental oxygen was provided and noted-      .   Signs/symptoms of respiratory failure include- tachypnea, increased work of breathing, respiratory distress, and use of accessory muscles. Contributing diagnoses includes - Pleural effusion Labs and images were reviewed. Patient Has recent ABG, which has been reviewed. Will treat underlying causes and adjust management of respiratory failure as follows-     Patient without CHF on most recent Echo (01/12/24) which demonstrated EF 60-65%, no significant valvulopathy, borderline pulm HTN. On torsemide at baseline. Patient reports having some high sodium foods lately. High suspicion for hypervolemia given BNP elevation, though this may be confounded by CKD.     - Appreciated emergent HD by Nephro for volume management  - Admitted on BiPAP but weaned to nasal cannula 5 liters/minute after HD  - Repeat echocardiogram with new LVEF 15-20% with concerns for hypoperfusion  - Cardiology consulted  - Interventional Cardiology deferring coronary angiography due to respiratory status

## 2024-02-14 NOTE — PROGRESS NOTES
Pharmacokinetic Assessment Follow Up: IV Vancomycin    Vancomycin serum concentration assessment(s):    The random level was drawn correctly and can be used to guide therapy at this time. The measurement is within the desired definitive target range of 10 to 20 mcg/mL.    Vancomycin Regimen Plan:    Change regimen to Vancomycin 500 mg given after dialysis 2/15/24, next random to be scheduled before next dialysis on 2/16/24 with AM labs    Drug levels (last 3 results):  Recent Labs   Lab Result Units 02/13/24  0334 02/14/24  0525   Vancomycin, Random ug/mL 7.1 11.2       Pharmacy will continue to follow and monitor vancomycin.    Please contact pharmacy at extension [phone] for questions regarding this assessment.    Thank you for the consult,   London Parada       Patient brief summary:  Lily Green is a 73 y.o. female initiated on antimicrobial therapy with IV Vancomycin for treatment of skin & soft tissue infection        Drug Allergies:   Review of patient's allergies indicates:   Allergen Reactions    Crestor [rosuvastatin] Swelling    Gluten protein        Actual Body Weight:   67.6 kg    Renal Function:   Estimated Creatinine Clearance: 56.4 mL/min (based on SCr of 0.8 mg/dL).,     Dialysis Method (if applicable):  SCUF    CBC (last 72 hours):  Recent Labs   Lab Result Units 02/12/24  0115 02/12/24  0556 02/13/24  0334 02/14/24  0305   WBC K/uL 11.47 12.03 11.06 9.83   Hemoglobin g/dL 10.5* 10.6* 9.2* 8.4*   Hematocrit % 33.0* 33.4* 29.6* 27.2*   Platelets K/uL 334 207 178 188   Gran % % 92.0* 83.1* 80.1* 78.2*   Lymph % % 3.0* 5.0* 8.4* 10.9*   Mono % % 4.0 11.1 10.8 10.3   Eosinophil % % 0.0 0.0 0.0 0.0   Basophil % % 0.0 0.2 0.1 0.1   Differential Method  Automated Automated Automated Automated       Metabolic Panel (last 72 hours):  Recent Labs   Lab Result Units 02/12/24  0115 02/12/24  0556 02/12/24  2120 02/13/24  0334 02/13/24  1354 02/13/24  2201 02/14/24  0305   Sodium mmol/L 133* 135*  131* 131*  131* 130* 130* 129*  129*   Potassium mmol/L 3.8 3.6 4.1 4.3  4.3 3.9 4.3 4.2  4.2   Chloride mmol/L 93* 97 99 97  97 98 98 97  97   CO2 mmol/L 24 25 24 23  23 22* 23 22*  22*   Glucose mg/dL 266* 178* 170* 172*  172* 261* 221* 299*  299*   BUN mg/dL 34* 21 18 9  9 15 9 5*  5*   Creatinine mg/dL 4.0* 2.6* 2.2* 1.1  1.1 1.5* 1.1 0.8  0.8   Albumin g/dL 2.5* 2.5* 2.3* 2.1*  2.1* 2.1* 2.1* 2.0*  2.0*   Total Bilirubin mg/dL 0.4 0.3  --  0.3  --   --  0.3   Alkaline Phosphatase U/L 101 101  --  93  --   --  88   AST U/L 17 23  --  29  --   --  18   ALT U/L 7* 8*  --  10  --   --  9*   Magnesium mg/dL  --  1.8 1.8 2.1  2.1 1.8 2.1 1.9  1.9   Phosphorus mg/dL  --  1.6* 1.6* 3.7  3.7 4.4 2.4* 3.0  3.0       Vancomycin Administrations:  vancomycin given in the last 96 hours                     vancomycin (VANCOCIN) 1,000 mg in dextrose 5 % (D5W) 250 mL IVPB (Vial-Mate) (mg) 1,000 mg New Bag 02/13/24 1337    vancomycin (VANCOCIN) 1,000 mg in dextrose 5 % (D5W) 250 mL IVPB (Vial-Mate) (mg) 1,000 mg New Bag 02/12/24 0533                    Microbiologic Results:  Microbiology Results (last 7 days)       Procedure Component Value Units Date/Time    Blood culture [[id number]] Collected: 02/12/24 0526    Order Status: Completed Specimen: Blood from Peripheral, Antecubital, Right Updated: 02/14/24 0812     Blood Culture, Routine No Growth to date      No Growth to date      No Growth to date    Blood culture [[id number]] Collected: 02/12/24 0526    Order Status: Completed Specimen: Blood from Peripheral, Upper Arm, Right Updated: 02/14/24 0812     Blood Culture, Routine No Growth to date      No Growth to date      No Growth to date

## 2024-02-14 NOTE — ASSESSMENT & PLAN NOTE
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  She was on meropenem, then transitioned to ceftriaxone and metronidazole.  She was ultimately discharged on cefpodoxime and metronidazole with a plan to complete 4 weeks of antibiotics on February 15.  She is now re-admitted with respiratory distress.  ID consulted to assist with her care.    CT scan reviewed.  Per radiology read, there is a small amount of fluid and gas with associated inflammatory changes in the azygoesophageal recess.  CT surgery and IR evaluated and no invasive procedures can be performed for this.      Spoke to nephrology service about utility of draining the pleural fluid.  This may be helpful in terms of symptom relief as well as for diagnostic purposes.  Fluid should be sent for cell count, diff, LDH, gram stain, culture.    Will continue on vancomycin and zosyn for now.

## 2024-02-14 NOTE — NURSING
SICU PLAN OF CARE NOTE    Dx: Acute hypoxemic respiratory failure    Goals of Care:   MAP >65    Vital Signs (last 12 hours):   Temp:  [97.4 °F (36.3 °C)-97.7 °F (36.5 °C)]   Pulse:  []   Resp:  [15-47]   BP: ()/(51-82)   SpO2:  [93 %-100 %]      Neuro: AAO x4, Follows Commands, and Moves All Extremities    Cardiac: AFib    Respiratory: Nasal Cannula    Gtts: Amiodarone    Urine Output: Anuric     Diet: Soft bite sized, gluten free     Labs/Accuchecks: ACHS. Daily labs    Skin:  All skin remains free from injury.  Patient turned Q2, mattress inflated, and bed working correctly.    Shift Events: 10 hrs of SLED completed. Pt in and out of Afib. Mg replaced. 2 BM this shift. See flowsheet for further assessment/details.  Family updated on current condition/plan of care, questions answered, and emotional support provided.  MD updated on current condition, vitals, labs, and gtts. No new orders received, will continue to monitor.

## 2024-02-14 NOTE — PROGRESS NOTES
02/13/24 1837   Treatment   Treatment Type SLED   Treatment Status Restart   Dialysis Machine Number [id number]   Dialyzer Time (hours) 0   BVP (Liters) 0 L   Solutions Labeled and Current  Yes   Access Tunneled Cath;Right;IJ   Catheter Dressing Intact  Yes   Alarms Engaged Yes   CRRT Comments sled restarted as ordered   Prescription   Time (Hours) 10   Dialysate K + (mEq/L) 4   Dialysate CA + (mEq/L) 2.5   Dialysate HCO3 - (Bicarb) (mEq/L) Other (Comment)  (27)   Dialysate Na + (mEq/L) 138   Cartridge Type Other  (r300)   Dialysate Flow Rate (mL/min) 200   UF Goal Rate 400 mL/hr   CRRT Hourly Documentation   Blood Flow (mL/min) 200   UF Rate 200 cc/hr   Arterial Pressure (mmHg) -50 mmHg   Venous Pressure (mmHg) 30 mmHg   Effluent Pressure (EP) (mmHg) 20 mmHg   Total UF (Hourly Cleared) (mL) 0     SLED restarted as ordered. RIJ permacath aspirated, flushed, and accessed using aseptic technique. Lines connected and secured.

## 2024-02-14 NOTE — PROGRESS NOTES
[facility] - Surgical Intensive Care  Critical Care Medicine  Progress Note    Patient Name: Lily Green  MRN: [id number]  Admission Date: 2/12/2024  Hospital Length of Stay: 2 days  Code Status: Full Code  Attending Provider: Meghan Trujillo MD  Primary Care Provider: Pavel Calixto MD   Principal Problem: Acute hypoxemic respiratory failure    Subjective:     HPI:  Ms. Green is a 73 y.o. 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 She was last admitted from 1/2/24-2/5/24 (35 days) on the thoracic surgery service during which she underwent a right lower lobectomy for resection of her carcinoid tumor and MLND, ultimately requiring a thoracotomy. Post-op, she received a right sided chest tube. 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 Subsequent GIB and discovery of nonbleeding gastric ulcers on EGD made full anticoagulation high risk, so it was stopped. She was ultimately Dc'd to [facility] in [place] where she presents from now.     She presents today from SNF with increased work of breathing, dyspnea, and hypoxia, with oxygen saturation in the 70% on 2L. She became acutely short of breath today and was brought in by EMS after being put on CPAP in the field with some improvement. Placed on BIPAP in the ED. She continues to be tachycardic and tachypnic. She last completed a full session of HD on Saturday. She reports cough and nausea, but denies fever, chills, pain, chest pain, neck pain. She has little residual renal function . Unclear if she has been taking her Torsemide listed as her home med. Her workup was significant for BNP of 2754, Trop 0.312. VBG reveals pH 7.438/38.9/29. EKG reveals sinus tachycardia. CXR has evident pleural effusions and possible multifocal pneumonia. Nephro was consulted  and emergent HD orders were placed with goal of removing 3 L urgently. She will be admitted to MICU for hypoxic respiratory failure and increased work of breathing. CTA PE study is still pending at time of evaluation since patient is getting HD.     Hospital/ICU Course:  2/12:  Patient admitted for hypoxic respiratory failure.  Echocardiogram revealed new low LVEF with elevated troponins.  Cardiology consulted.  2/13:  Patient developed AFib with RVR this morning--started amiodarone infusion.  Restarted on SLED overnight.  CT findings with a couple fluid collections in the right paraesophageal space and azygoesophageal recess.  2/14:  CT Surgery consulted and recommended IR consultation for evaluation for drainage; unfortunately, no safe window for approach.  Interventional Cardiology deferring angiography due to respiratory status.  Palliative Care consulted.  10 hours SLED overnight with removal of 2.1 L. A couple episodes of RVR overnight--increasing metoprolol this morning.    Interval History/Significant Events:  Patient had a couple episodes of RVR overnight--increase metoprolol this morning.  SLED 10 hours overnight with removal of 2.1 L.  CT Surgery consult for fluid collection in the right paraesophageal space and azygoesophageal recess--recommended Interventional Radiology consultation for possible drainage.  IR reports no safe window for access.    Review of Systems   Respiratory:  Positive for shortness of breath.    Cardiovascular:  Negative for chest pain.   All other systems reviewed and are negative.    Objective:     Vital Signs (Most Recent):  Temp: 97.7 °F (36.5 °C) (02/14/24 0300)  Pulse: 94 (02/14/24 0515)  Resp: (!) 35 (02/14/24 0515)  BP: 102/67 (02/14/24 0515)  SpO2: 98 % (02/14/24 0515) Vital Signs (24h Range):  Temp:  [97.4 °F (36.3 °C)-98.2 °F (36.8 °C)] 97.7 °F (36.5 °C)  Pulse:  [] 94  Resp:  [15-47] 35  SpO2:  [93 %-100 %] 98 %  BP: ()/(51-84) 102/67   Weight: 67.6 kg (149  lb 0.5 oz)  Body mass index is 24.8 kg/m².      Intake/Output Summary (Last 24 hours) at 2/14/2024 0807  Last data filed at 2/14/2024 0500  Gross per 24 hour   Intake 3003.15 ml   Output 2129 ml   Net 874.15 ml            Physical Exam  Vitals and nursing note reviewed.   Constitutional:       General: She is not in acute distress.     Appearance: She is normal weight. She is not ill-appearing, toxic-appearing or diaphoretic.   HENT:      Head: Normocephalic and atraumatic.      Right Ear: External ear normal.      Left Ear: External ear normal.      Nose: Nose normal.   Cardiovascular:      Rate and Rhythm: Tachycardia present. Rhythm irregular.      Pulses: Normal pulses.      Heart sounds: Normal heart sounds. No murmur heard.     No friction rub. No gallop.   Pulmonary:      Comments: Moderately increased respiratory effort with bilateral crackles  Abdominal:      General: Abdomen is flat. Bowel sounds are normal. There is no distension.      Palpations: Abdomen is soft.      Tenderness: There is no abdominal tenderness. There is no guarding or rebound.   Musculoskeletal:         General: Swelling present. No tenderness. Normal range of motion.   Skin:     General: Skin is warm and dry.      Coloration: Skin is not jaundiced or pale.   Neurological:      General: No focal deficit present.      Mental Status: She is alert and oriented to person, place, and time. Mental status is at baseline.   Psychiatric:         Mood and Affect: Mood normal.         Behavior: Behavior normal.         Thought Content: Thought content normal.         Judgment: Judgment normal.            Vents:  Oxygen Concentration (%): 32 (02/13/24 0019)  Lines/Drains/Airways       Central Venous Catheter Line  Duration             Permacath right subclavian -- days         Hemodialysis Catheter 01/25/23 1501 right internal jugular 384 days              Peripheral Intravenous Line  Duration                  Hemodialysis AV Fistula 09/01/23 0800  Left forearm 166 days         Peripheral IV - Single Lumen 02/12/24 0207 18 G Anterior;Proximal;Right Forearm 2 days         Peripheral IV - Single Lumen 02/12/24 1741 22 G;1 in Anterior;Right Wrist 1 day                  Significant Labs:    CBC/Anemia Profile:  Recent Labs   Lab 02/13/24  0334 02/14/24  0305   WBC 11.06 9.83   HGB 9.2* 8.4*   HCT 29.6* 27.2*    188   MCV 96 97   RDW 22.6* 22.5*          Chemistries:  Recent Labs   Lab 02/13/24  0334 02/13/24  1354 02/13/24  2201 02/14/24  0305   *  131* 130* 130* 129*  129*   K 4.3  4.3 3.9 4.3 4.2  4.2   CL 97  97 98 98 97  97   CO2 23  23 22* 23 22*  22*   BUN 9  9 15 9 5*  5*   CREATININE 1.1  1.1 1.5* 1.1 0.8  0.8   CALCIUM 8.1*  8.1* 8.0* 7.8* 7.4*  7.4*   ALBUMIN 2.1*  2.1* 2.1* 2.1* 2.0*  2.0*   PROT 6.4  --   --  5.7*   BILITOT 0.3  --   --  0.3   ALKPHOS 93  --   --  88   ALT 10  --   --  9*   AST 29  --   --  18   MG 2.1  2.1 1.8 2.1 1.9  1.9   PHOS 3.7  3.7 4.4 2.4* 3.0  3.0         All pertinent labs within the past 24 hours have been reviewed.    Significant Imaging:  I have reviewed all pertinent imaging results/findings within the past 24 hours.    ABG  Recent Labs   Lab 02/13/24  0956 02/14/24  0826   PH 7.399  --    PO2 35* 34*   PCO2 36.8  --    HCO3 22.8*  --    BE -2  --      Assessment/Plan:     Psychiatric  Generalized anxiety disorder  - Continue home venlafaxine    Pulmonary  * Acute hypoxemic respiratory failure  Patient with Hypoxic Respiratory failure which is Acute on chronic.  she is on home oxygen at 2 LPM. Supplemental oxygen was provided and noted-      .   Signs/symptoms of respiratory failure include- tachypnea, increased work of breathing, respiratory distress, and use of accessory muscles. Contributing diagnoses includes - Pleural effusion Labs and images were reviewed. Patient Has recent ABG, which has been reviewed. Will treat underlying causes and adjust management of respiratory failure as  follows-     Patient without CHF on most recent Echo (01/12/24) which demonstrated EF 60-65%, no significant valvulopathy, borderline pulm HTN. On torsemide at baseline. Patient reports having some high sodium foods lately. High suspicion for hypervolemia given BNP elevation, though this may be confounded by CKD.     - Appreciated emergent HD by Nephro for volume management  - Admitted on BiPAP but weaned to nasal cannula 5 liters/minute after HD  - Repeat echocardiogram with new LVEF 15-20% with concerns for hypoperfusion  - Cardiology consulted  - Interventional Cardiology deferring coronary angiography due to respiratory status    Parapneumonic effusion  On Cefpodoxime and Flagyl until planned end date of 02/15/24 per ID recs. Given recent hospitalization and re-admission will start broad spectrum Abx.     - f/u Bcx, de-escalate pending cultures    Cardiac/Vascular  New onset of congestive heart failure  Patient is identified as having Systolic (HFrEF) heart failure that is Acute. CHF is currently uncontrolled due to Rales/crackles on pulmonary exam and Pulmonary edema/pleural effusion on CXR. Latest ECHO performed and demonstrates- Results for orders placed during the hospital encounter of 02/12/24    Echo    Interpretation Summary    Left Ventricle: The left ventricle is normal in size. Normal wall thickness. Regional wall motion abnormalities present. See diagram for wall motion findings. There is severely reduced systolic function with a visually estimated ejection fraction of 15 - 20%. Ejection fraction by visual approximation is 15%. Unable to assess diastolic function due to atrial fibrillation.    Right Ventricle: Normal right ventricular cavity size. Wall thickness is normal. Right ventricle wall motion  is normal. Systolic function is normal.    Left Atrium: Left atrium is severely dilated.    Right Atrium: Right atrium is mildly dilated.    Aortic Valve: There is moderate aortic valve sclerosis. Mildly  restricted motion.    Mitral Valve: There is moderate to severe regurgitation.    Tricuspid Valve: There is mild regurgitation.    IVC/SVC: Intermediate venous pressure at 8 mmHg.    Pericardium: There is a trivial effusion. Left pleural effusion.  . Continue Beta Blocker, ACE/ARB, and ARNI and monitor clinical status closely. Monitor on telemetry. Patient is on CHF pathway.  Monitor strict Is&Os and daily weights.  Place on fluid restriction of 1.5 L. Cardiology has been consulted. Continue to stress to patient importance of self efficacy and  on diet for CHF. Last BNP reviewed- and noted below   Recent Labs   Lab 02/12/24  0115   BNP 2,754*         Atrial fibrillation  Continue home med lopressor. No therapeutic anticoagulation for now given GIB during previous hospitalization    - 2/13: Developed AFib with RVR and started on amiodarone infusion    Hyperlipidemia, mixed  - Continue home statin    Renal/  Chronic kidney disease-mineral and bone disorder  - Continue home Sevelamer    ESRD (end stage renal disease)  Patient reports making small volumes of urine. Last HD on Saturday     - Nephro consulted for HD needs    Oncology  Anemia in ESRD (end-stage renal disease)  Has anemia of ESRD. Goal Hemoglobin >10    Endocrine  Moderate malnutrition  Nutrition consulted. Most recent weight and BMI monitored-     Measurements:  Wt Readings from Last 1 Encounters:   02/12/24 67.6 kg (149 lb 0.5 oz)   Body mass index is 24.8 kg/m².    Patient has been screened and assessed by RD.    Malnutrition Type:  Context: chronic illness  Level: moderate    Malnutrition Characteristic Summary:  Energy Intake (Malnutrition): less than 75% for greater than or equal to 1 month  Muscle Mass (Malnutrition): mild depletion  Fluid Accumulation (Malnutrition): severe    Interventions/Recommendations (treatment strategy):  1. Continue gluten free diet with texture per SLP  2. modify ONS to [facility] renal TID  3. RD to monitor and  follow      Type 2 diabetes mellitus with diabetic polyneuropathy, without long-term current use of insulin  Last A1C 4.9    - LD SSI, add scheduled detemir and aspart if needed    GI  GIB (gastrointestinal bleeding)  - Recent GIB on last admission with GI recommending 8 week course of Protonix s/p EGD with nonbleeding gastric ulcer. PPI end date 02/23/24 and follow up EGD planned at that time. Continue PPI while inpatient.             Critical Care Time: 35 minutes  Critical secondary to Patient has a condition that poses threat to life and bodily function: Severe Respiratory Distress      Critical care was time spent personally by me on the following activities: development of treatment plan with patient or surrogate and bedside caregivers, discussions with consultants, evaluation of patient's response to treatment, examination of patient, ordering and performing treatments and interventions, ordering and review of laboratory studies, ordering and review of radiographic studies, pulse oximetry, re-evaluation of patient's condition. This critical care time did not overlap with that of any other provider or involve time for any procedures.         Meghan Trujillo M.D.  Rapid Response/Critical Care  Department of Pulmonary Medicine  [facility]        N.B.: Portions of this note was dictated using M*Modal Fluency Direct--there may be voice recognition errors occasionally missed on review.

## 2024-02-14 NOTE — PLAN OF CARE
2:44 PM  The CM team met with the patient's  to follow-up regarding the patient's belongings at [facility]. Per the patient's , his sister in law had spoke with the  at [facility] regarding the patient's belongings. The patient's  reported that the patient's belongings are okay and the patient's sister will be making arrangements to  the patient's belongings from the nursing facility.    The SW will continue to follow.     Alberto Fitch LMSW  Case Management [facility]

## 2024-02-14 NOTE — ASSESSMENT & PLAN NOTE
ESRD   Hypoxic resp failure - improved with 1.5 liter removal;   ECHO 2/12 with newly depressed EF of 10-15% from 60-65% one month prior    Plan/Recommendation  -SCUF now to assist with respiratory distress.   -Keep MAP > 65  -Keep hemoglobin > 7  -Strict ins and outs  -Avoid nephrotoxic agents if possible and renally dose medications  -Avoid drastic hemodynamic changes if possible

## 2024-02-14 NOTE — ASSESSMENT & PLAN NOTE
Goal hemoglobin in ESRD is 10-12  Hemoglobin   Date Value Ref Range Status   02/14/2024 8.4 (L) 12.0 - 16.0 g/dL Final     Iron   Date Value Ref Range Status   02/02/2024 52 30 - 160 ug/dL Final     Transferrin   Date Value Ref Range Status   02/02/2024 121 (L) 200 - 375 mg/dL Final     TIBC   Date Value Ref Range Status   02/02/2024 179 (L) 250 - 450 ug/dL Final     Saturated Iron   Date Value Ref Range Status   02/02/2024 29 20 - 50 % Final     Ferritin   Date Value Ref Range Status   02/02/2024 1,803 (H) 20.0 - 300.0 ng/mL Final

## 2024-02-14 NOTE — ASSESSMENT & PLAN NOTE
Patient presents with acute onset of respiratory failure and was found to have a drop in her EF to 15%.  Presenting rhythm was atrial flutter with 2:1 now she is now sinus tachycardic  Also has elevated troponin that trended up to 2.  Patient's lower extremities are cold and appears mottled which is concerning for shock  SvO2 checked from subclavian line is 56   CO/CI 4.2/2.3  Lactic acid 1.8    Svo2 today 65 CVP 10.     Plan  - Agree with metoprolol tartrate 50 mg BID and titrate up  - Agree with initiation of amiodarone gtt   - Evaluated by interevention cardiology for ischemic cardiomyopathy, unable to lay flat, will re-evaluate once pt improves clinically    - Recommend to start patient on valsartan 40 mg b.i.d. and titrate up as tolerated, as clinically appropriate

## 2024-02-14 NOTE — SUBJECTIVE & OBJECTIVE
Interval History: remains critically ill    Review of Systems   Unable to perform ROS: Acuity of condition     Objective:     Vital Signs (Most Recent):  Temp: 98.5 °F (36.9 °C) (02/14/24 1200)  Pulse: 87 (02/14/24 1345)  Resp: (!) 26 (02/14/24 1345)  BP: (!) 110/59 (02/14/24 1330)  SpO2: 98 % (02/14/24 1345) Vital Signs (24h Range):  Temp:  [97.4 °F (36.3 °C)-98.5 °F (36.9 °C)] 98.5 °F (36.9 °C)  Pulse:  [] 87  Resp:  [15-50] 26  SpO2:  [93 %-100 %] 98 %  BP: ()/(51-82) 110/59     Weight: 67.6 kg (149 lb)  Body mass index is 24.79 kg/m².    Estimated Creatinine Clearance: 56.4 mL/min (based on SCr of 0.8 mg/dL).     Physical Exam  Vitals and nursing note reviewed.   Constitutional:       General: She is in acute distress (visibly short of breath).      Appearance: She is well-developed. She is ill-appearing. She is not diaphoretic.   HENT:      Head: Normocephalic and atraumatic.      Right Ear: External ear normal.      Left Ear: External ear normal.      Nose: Nose normal.      Mouth/Throat:      Pharynx: No oropharyngeal exudate.   Eyes:      General: No scleral icterus.        Right eye: No discharge.         Left eye: No discharge.      Conjunctiva/sclera: Conjunctivae normal.      Pupils: Pupils are equal, round, and reactive to light.   Neck:      Thyroid: No thyromegaly.      Vascular: No JVD.      Trachea: No tracheal deviation.   Cardiovascular:      Rate and Rhythm: Normal rate and regular rhythm.      Heart sounds: No murmur heard.     No friction rub. No gallop.   Pulmonary:      Effort: No respiratory distress.      Breath sounds: Normal breath sounds. No stridor. No wheezing or rales.      Comments: Tachypneic. Visibly short of breath    Chest:      Chest wall: No tenderness.   Abdominal:      General: Bowel sounds are normal. There is no distension.      Palpations: Abdomen is soft. There is no mass.      Tenderness: There is no abdominal tenderness. There is no guarding or rebound.    Musculoskeletal:         General: No tenderness. Normal range of motion.      Cervical back: Normal range of motion and neck supple.   Lymphadenopathy:      Cervical: No cervical adenopathy.   Skin:     General: Skin is warm.      Coloration: Skin is not pale.      Findings: No erythema or rash.   Neurological:      Mental Status: She is alert and oriented to person, place, and time.      Cranial Nerves: No cranial nerve deficit.      Motor: No abnormal muscle tone.      Coordination: Coordination normal.      Deep Tendon Reflexes: Reflexes normal.          Significant Labs:   Microbiology Results (last 7 days)       Procedure Component Value Units Date/Time    Blood culture [[id number]] Collected: 02/12/24 0526    Order Status: Completed Specimen: Blood from Peripheral, Antecubital, Right Updated: 02/14/24 0812     Blood Culture, Routine No Growth to date      No Growth to date      No Growth to date    Blood culture [[id number]] Collected: 02/12/24 0526    Order Status: Completed Specimen: Blood from Peripheral, Upper Arm, Right Updated: 02/14/24 0812     Blood Culture, Routine No Growth to date      No Growth to date      No Growth to date            Significant Imaging: I have reviewed all pertinent imaging results/findings within the past 24 hours.

## 2024-02-14 NOTE — SUBJECTIVE & OBJECTIVE
Interval History: overnight SLED though remains positive on I/O - unable to pull more UF due to blood pressure    Review of patient's allergies indicates:   Allergen Reactions    Crestor [rosuvastatin] Swelling    Gluten protein      Current Facility-Administered Medications   Medication Frequency    0.9%  NaCl infusion (CRRT USE ONLY) Continuous    0.9%  NaCl infusion Once    0.9%  NaCl infusion Continuous    acetaminophen tablet 650 mg Q4H PRN    albuterol-ipratropium 2.5 mg-0.5 mg/3 mL nebulizer solution 3 mL Q4H PRN    amiodarone (CORDARONE) 900 mg in dextrose 5 % (D5W) 250 mL (3.6 mg/mL) IV infusion - HIGH Continuous    atorvastatin tablet 40 mg QHS    dextrose 10% bolus 125 mL 125 mL PRN    dextrose 10% bolus 250 mL 250 mL PRN    droNABinol capsule 5 mg BID AC    ferrous gluconate tablet 324 mg Daily with breakfast    glucagon (human recombinant) injection 1 mg PRN    glucose chewable tablet 16 g PRN    glucose chewable tablet 24 g PRN    heparin (porcine) injection 5,000 Units Q8H    insulin aspart U-100 pen 0-5 Units QID (AC + HS) PRN    magnesium sulfate 2g in water 50mL IVPB (premix) PRN    melatonin tablet 6 mg Nightly PRN    metoprolol tartrate (LOPRESSOR) tablet 50 mg BID    mupirocin 2 % ointment BID    ondansetron injection 4 mg Q6H PRN    pantoprazole EC tablet 40 mg BID AC    piperacillin-tazobactam (ZOSYN) 4.5 g in dextrose 5 % in water (D5W) 100 mL IVPB (MB+) Q12H    pregabalin capsule 75 mg Daily    sodium chloride 0.9% flush 10 mL PRN    sodium phosphate 20.01 mmol in dextrose 5 % (D5W) 250 mL IVPB PRN    sodium phosphate 30 mmol in dextrose 5 % (D5W) 250 mL IVPB PRN    sodium phosphate 39.99 mmol in dextrose 5 % (D5W) 250 mL IVPB PRN    venlafaxine tablet 37.5 mg Daily       Objective:     Vital Signs (Most Recent):  Temp: 97.8 °F (36.6 °C) (02/14/24 0815)  Pulse: 95 (02/14/24 0845)  Resp: (!) 45 (02/14/24 0845)  BP: 108/62 (02/14/24 0830)  SpO2: 97 % (02/14/24 0845) Vital Signs (24h  Range):  Temp:  [97.4 °F (36.3 °C)-98.2 °F (36.8 °C)] 97.8 °F (36.6 °C)  Pulse:  [] 95  Resp:  [15-48] 45  SpO2:  [93 %-100 %] 97 %  BP: ()/(51-84) 108/62     Weight: 67.6 kg (149 lb 0.5 oz) (02/12/24 0830)  Body mass index is 24.8 kg/m².  Body surface area is 1.76 meters squared.    I/O last 3 completed shifts:  In: 5571.9 [I.V.:4447.1; IV Piggyback:1124.9]  Out: 4772 [Other:4772]     Physical Exam  Constitutional:       General: She is not in acute distress.     Appearance: She is not ill-appearing or toxic-appearing.   HENT:      Head: Normocephalic and atraumatic.      Nose: Nose normal.   Cardiovascular:      Comments: Right chest tunneled dialysis catheter  Pulmonary:      Effort: Respiratory distress present.      Breath sounds: Rhonchi present.   Abdominal:      General: Abdomen is flat. There is no distension.      Tenderness: There is no abdominal tenderness.   Musculoskeletal:      Right lower leg: No edema.      Left lower leg: No edema.      Comments: Left upper AVG, good thrill and hum   Skin:     General: Skin is warm.      Coloration: Skin is not jaundiced.      Findings: No bruising.   Neurological:      Mental Status: She is alert.          Significant Labs:  All labs within the past 24 hours have been reviewed.     Significant Imaging:  Labs: Reviewed

## 2024-02-14 NOTE — PROGRESS NOTES
[facility] - [facility]  Cardiology  Progress Note    Patient Name: Lily Green  MRN: [id number]  Admission Date: 2/12/2024  Hospital Length of Stay: 2 days  Code Status: Full Code   Attending Physician: Meghan Trujillo MD   Primary Care Physician: Pavel Calixto MD  Expected Discharge Date:   Principal Problem:Acute hypoxemic respiratory failure    Subjective:     Hospital Course:   No notes on file    Interval History: Continues to remain on 5L NC. Received 10 hrs of SLED last night, held d/t soft BP and afib with RVR. Telemetry this morning showing better rate control. Svo2 was 65 and CVP was 10 this morning. Intervention cardiology was consulted for [facility], no intervention at this time as patient cannot lay flat.   Review of Systems   Constitutional: Negative.   HENT: Negative.     Eyes: Negative.    Cardiovascular:  Positive for dyspnea on exertion.   Respiratory: Negative.     Endocrine: Negative.    Hematologic/Lymphatic: Negative.    Musculoskeletal: Negative.    Gastrointestinal: Negative.    Genitourinary: Negative.    Neurological: Negative.      Objective:     Vital Signs (Most Recent):  Temp: 97.8 °F (36.6 °C) (02/14/24 0815)  Pulse: 88 (02/14/24 1123)  Resp: (!) 39 (02/14/24 1125)  BP: 104/60 (02/14/24 1123)  SpO2: 99 % (02/14/24 1100) Vital Signs (24h Range):  Temp:  [97.4 °F (36.3 °C)-98.2 °F (36.8 °C)] 97.8 °F (36.6 °C)  Pulse:  [] 88  Resp:  [15-48] 39  SpO2:  [93 %-100 %] 99 %  BP: ()/(51-83) 104/60     Weight: 67.6 kg (149 lb 0.5 oz)  Body mass index is 24.8 kg/m².     SpO2: 99 %         Intake/Output Summary (Last 24 hours) at 2/14/2024 1141  Last data filed at 2/14/2024 0500  Gross per 24 hour   Intake 2935.72 ml   Output 2129 ml   Net 806.72 ml         Lines/Drains/Airways       Central Venous Catheter Line  Duration             Permacath right subclavian -- days         Hemodialysis Catheter 01/25/23 1501 right internal jugular 384 days              Peripheral  Intravenous Line  Duration                  Hemodialysis AV Fistula 09/01/23 0800 Left forearm 166 days         Peripheral IV - Single Lumen 02/12/24 0207 18 G Anterior;Proximal;Right Forearm 2 days         Peripheral IV - Single Lumen 02/12/24 1741 22 G;1 in Anterior;Right Wrist 1 day                       Physical Exam  Constitutional:       Appearance: She is ill-appearing and toxic-appearing.   HENT:      Head: Normocephalic.   Cardiovascular:      Rate and Rhythm: Regular rhythm. Tachycardia present.      Pulses: Normal pulses.      Comments: Gallop present  Pulmonary:      Effort: Pulmonary effort is normal.      Breath sounds: Wheezing present.      Comments: Mild crackles present at the bases  Abdominal:      General: Abdomen is flat. Bowel sounds are normal.      Palpations: Abdomen is soft.   Musculoskeletal:         General: Normal range of motion.      Cervical back: Normal range of motion.   Skin:     General: Skin is warm.      Capillary Refill: Capillary refill takes less than 2 seconds.   Neurological:      General: No focal deficit present.      Mental Status: She is alert and oriented to person, place, and time.            Significant Labs: All pertinent lab results from the last 24 hours have been reviewed.    Significant Imaging: Echocardiogram: Transthoracic echo (TTE) complete (Cupid Only):   Results for orders placed or performed during the hospital encounter of 02/12/24   Echo   Result Value Ref Range    RA Width 3.99 cm    Left Atrium Major Axis 5.32 cm    Left Atrium Minor Axis 4.47 cm    RA Major Axis 5.10 cm    LV Diastolic Volume 103.69 mL    LV Systolic Volume 66.01 mL    Ao VTI 12.80 cm    Ao peak nadiya 1.11 m/s    LVOT peak VTI 12.14 cm    LVOT peak nadiya 1.01 m/s    LVOT diameter 1.90 cm    AV mean gradient 4 mmHg    TAPSE 1.34 cm    RVDD 2.27 cm    LA size 3.69 cm    Ascending aorta 3.35 cm    STJ 2.91 cm    Sinus 2.98 cm    LVIDs 3.90 2.1 - 4.0 cm    Posterior Wall 1.07 0.6 - 1.1 cm     IVS 0.80 0.6 - 1.1 cm    LVIDd 4.73 3.5 - 6.0 cm    LA WIDTH 4.15 cm    FS 18 (A) 28 - 44 %    LA volume 63.24 cm3    LV mass 151.75 g    Left Ventricle Relative Wall Thickness 0.45 cm    AV valve area 2.69 cm²    AV Velocity Ratio 0.91     AV index (prosthetic) 0.95     LVOT area 2.8 cm2    LVOT stroke volume 34.40 cm3    AV peak gradient 5 mmHg    IVANNA by Velocity Ratio 2.58 cm²    EF 15 %    Est. RA pres 8 mmHg    Narrative      Left Ventricle: The left ventricle is normal in size. Normal wall   thickness. Regional wall motion abnormalities present. See diagram for   wall motion findings. There is severely reduced systolic function with a   visually estimated ejection fraction of 15 - 20%. Ejection fraction by   visual approximation is 15%. Unable to assess diastolic function due to   atrial fibrillation.    Right Ventricle: Normal right ventricular cavity size. Wall thickness   is normal. Right ventricle wall motion  is normal. Systolic function is   normal.    Left Atrium: Left atrium is severely dilated.    Right Atrium: Right atrium is mildly dilated.    Aortic Valve: There is moderate aortic valve sclerosis. Mildly   restricted motion.    Mitral Valve: There is moderate to severe regurgitation.    Tricuspid Valve: There is mild regurgitation.    IVC/SVC: Intermediate venous pressure at 8 mmHg.    Pericardium: There is a trivial effusion. Left pleural effusion.      and X-Ray: CXR: X-Ray Chest PA and Lateral (CXR): No results found for this visit on 02/12/24.  Assessment and Plan:     Atrial flutter  Presenting rhythm was atrial flutter with 2:1 conduction with spontaneously converted.    She also has history of atrial fibrillation.  Currently not on anticoagulation because of previous GI bleed in the ulcers on EGD.     New onset of congestive heart failure  Patient presents with acute onset of respiratory failure and was found to have a drop in her EF to 15%.  Presenting rhythm was atrial flutter with 2:1 now  she is now sinus tachycardic  Also has elevated troponin that trended up to 2.  Patient's lower extremities are cold and appears mottled which is concerning for shock  SvO2 checked from subclavian line is 56   CO/CI 4.2/2.3  Lactic acid 1.8    Svo2 today 65 CVP 10.     Plan  - Agree with metoprolol tartrate 50 mg BID and titrate up  - Agree with initiation of amiodarone gtt   - Evaluated by interevention cardiology for ischemic cardiomyopathy, unable to lay flat, will re-evaluate once pt improves clinically    - Recommend to start patient on valsartan 40 mg b.i.d. and titrate up as tolerated, as clinically appropriate       Thank you for involving us in the care of Ms. Green. Given rate control has been achieved, we will sign off at this time. Please contact us for any further questions or concerns.   VTE Risk Mitigation (From admission, onward)           Ordered     heparin (porcine) injection 5,000 Units  Every 8 hours         02/12/24 0416     IP VTE HIGH RISK PATIENT  Once         02/12/24 0416     Place sequential compression device  Until discontinued         02/12/24 0416     heparin (porcine) injection 1,000 Units  As needed (PRN)         02/12/24 0229                    Doreen Archer MD  Cardiology  [facility] - [facility] Int[facility]

## 2024-02-14 NOTE — HPI
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 Hypoxia resolved with dialysis but she remains tachypneic and developed afib w/RVR. Echo revealed newly reduced EF 15-20% with mod-severe MR. Initially there was concern for shock but lactate < 2 and CI > 2. IC consulted for evaluation of newly reduced EF. Of note, CTA shows loculated effusions.

## 2024-02-14 NOTE — CARE UPDATE
Thoracic Surgery    Patient discussed with medical ICU team regarding her azygoesophageal collection. This has been seen on prior scans and has not significantly changed, but there is persistent inflammation. This is highly unlikely to be secondary to an esophageal leak or fistula given the time course and normal esophagus noted on EGDs during her prior admission. Given its persistence, it is reasonable to discuss possible drainage with IR.    We will continue to follow. Please call with questions or concerns.

## 2024-02-14 NOTE — ASSESSMENT & PLAN NOTE
Acute new onset systolic HF 15-20% with mod-severe MR  Patient will need LHC to evaluate for ischemic cardiomyopathy however at the moment she is unable to lay flat for the duration of the procedure.  Plan for elective LHC once she improves clinically

## 2024-02-14 NOTE — ASSESSMENT & PLAN NOTE
Patient is identified as having Systolic (HFrEF) heart failure that is Acute. CHF is currently uncontrolled due to Rales/crackles on pulmonary exam and Pulmonary edema/pleural effusion on CXR. Latest ECHO performed and demonstrates- Results for orders placed during the hospital encounter of 02/12/24    Echo    Interpretation Summary    Left Ventricle: The left ventricle is normal in size. Normal wall thickness. Regional wall motion abnormalities present. See diagram for wall motion findings. There is severely reduced systolic function with a visually estimated ejection fraction of 15 - 20%. Ejection fraction by visual approximation is 15%. Unable to assess diastolic function due to atrial fibrillation.    Right Ventricle: Normal right ventricular cavity size. Wall thickness is normal. Right ventricle wall motion  is normal. Systolic function is normal.    Left Atrium: Left atrium is severely dilated.    Right Atrium: Right atrium is mildly dilated.    Aortic Valve: There is moderate aortic valve sclerosis. Mildly restricted motion.    Mitral Valve: There is moderate to severe regurgitation.    Tricuspid Valve: There is mild regurgitation.    IVC/SVC: Intermediate venous pressure at 8 mmHg.    Pericardium: There is a trivial effusion. Left pleural effusion.  . Continue Beta Blocker, ACE/ARB, and ARNI and monitor clinical status closely. Monitor on telemetry. Patient is on CHF pathway.  Monitor strict Is&Os and daily weights.  Place on fluid restriction of 1.5 L. Cardiology has been consulted. Continue to stress to patient importance of self efficacy and  on diet for CHF. Last BNP reviewed- and noted below   Recent Labs   Lab 02/12/24  0115   BNP 2,754*

## 2024-02-14 NOTE — PT/OT/SLP PROGRESS
Occupational Therapy      Patient Name:  Lily Green   MRN:  [id number]     Pt not seen this date. Pt with elevated respiratory rate (44) at rest.   OT to check status at later date.       2/14/2024

## 2024-02-14 NOTE — SUBJECTIVE & OBJECTIVE
Past Medical History:   Diagnosis Date    Anxiety     Celiac disease 2018    Celiac disease     Depression     Diabetes mellitus     Family history of breast cancer in mother      at age 68    Hyperlipidemia     Hypertension     Meniere disease     Meniere's disease, unspecified ear     Menopause     Peptic ulcer     Reflux esophagitis     Urinary tract infection     Vaginal infection     Vaginal Pap smear 2014    Pap/Hpv Negative        Past Surgical History:   Procedure Laterality Date    APPENDECTOMY      BREAST SURGERY      Tags    CARPAL TUNNEL RELEASE Bilateral 2017    ,     CLOSURE, COLOSTOMY Left 2023    Procedure: CLOSURE, COLOSTOMY;  Surgeon: Manuel Javier MD;  Location: [facility] OR;  Service: General;  Laterality: Left;    COLONOSCOPY  2018    Normal  ( Juan A)     COLOSTOMY Right 2023    Procedure: CREATION, COLOSTOMY;  Surgeon: Manuel Javier MD;  Location: [facility] OR;  Service: General;  Laterality: Right;    DILATION AND CURETTAGE OF UTERUS  1986    DUPUYTREN CONTRACTURE RELEASE Bilateral 2017    ESOPHAGOGASTRODUODENOSCOPY N/A 2024    Procedure: EGD (ESOPHAGOGASTRODUODENOSCOPY);  Surgeon: Yamilet Walters MD;  Location: [facility] ([address] [place] [address]);  Service: Gastroenterology;  Laterality: N/A;    ESOPHAGOGASTRODUODENOSCOPY N/A 2024    Procedure: EGD (ESOPHAGOGASTRODUODENOSCOPY);  Surgeon: Yamilet Walters MD;  Location: [address]);  Service: Gastroenterology;  Laterality: N/A;    HYSTERECTOMY      BEAU w/ appy, no BSO     IMPLANTATION OF LEADLESS PACEMAKER N/A 2024    Procedure: INSERTION, CARDIAC PACEMAKER, LEADLESS;  Surgeon: LENIN Frances MD;  Location: [facility] EP LAB;  Service: Cardiology;  Laterality: N/A;  SB, LEADLESS PPM (MICRA)SAM, ANES, EH, CVICU [id number]    INJECTION OF NEUROLYTIC AGENT AROUND LUMBAR SYMPATHETIC NERVE N/A 2022    Procedure: BLOCK, LUMBAR SYMPATHETIC;  Surgeon: Souleymane Rizo Jr., MD;  Location: [facility]  PAIN MGT;  Service: Pain Management;  Laterality: N/A;  no pacemaker   pt is diabetic     INJECTION OF NEUROLYTIC AGENT AROUND LUMBAR SYMPATHETIC NERVE N/A 8/25/2022    Procedure: BLOCK, LUMBAR SYMPATHETIC;  Surgeon: Souleymane Rizo Jr., MD;  Location: [facility] PAIN MGT;  Service: Pain Management;  Laterality: N/A;  diabetic     INSERTION OF TUNNELED CENTRAL VENOUS HEMODIALYSIS CATHETER Right 1/25/2023    Procedure: INSERTION, CATHETER, HEMODIALYSIS, DUAL LUMEN;  Surgeon: Manuel Javier MD;  Location: [facility] OR;  Service: General;  Laterality: Right;    INSERTION, CATHETER, TUNNELED Left 1/28/2023    Procedure: Insertion,catheter,tunneled;  Surgeon: Watson Fontenot MD;  Location: [facility] OR;  Service: General;  Laterality: Left;    LAPAROTOMY, EXPLORATORY N/A 1/14/2023    Procedure: LAPAROTOMY, EXPLORATORY;  Surgeon: Manuel Javier MD;  Location: [facility] OR;  Service: General;  Laterality: N/A;    LAPAROTOMY, EXPLORATORY N/A 1/16/2023    Procedure: LAPAROTOMY, EXPLORATORY;  Surgeon: Manuel Javier MD;  Location: [facility] OR;  Service: General;  Laterality: N/A;    LYMPHADENECTOMY Right 1/2/2024    Procedure: LYMPHADENECTOMY;  Surgeon: Tan Thomson MD;  Location: [address];  Service: Cardiothoracic;  Laterality: Right;    PACEMAKER, TEMPORARY, TRANSVENOUS, PEDIATRIC Right 1/12/2024    Procedure: Pacemaker, Temporary, Transvenous;  Surgeon: Todd Carlisle MD;  Location: [facility] CATH LAB;  Service: Cardiology;  Laterality: Right;    REMOVAL OF CATHETER Right 1/28/2023    Procedure: REMOVAL, CATHETER;  Surgeon: Watson Fontenot MD;  Location: [facility] OR;  Service: General;  Laterality: Right;    REMOVAL, TUNNELED CATH Right 1/25/2023    Procedure: REMOVAL, TUNNELED CATH;  Surgeon: Manuel Javier MD;  Location: [facility] OR;  Service: General;  Laterality: Right;    ROBOTIC BRONCHOSCOPY N/A 10/20/2023    Procedure: ROBOTIC BRONCHOSCOPY;  Surgeon: Mayda Monzon MD;  [facility];  Service:  Pulmonary;  Laterality: N/A;    SHOULDER SURGERY  2009 & 2010    right rotator cuff    THORACOTOMY Right 1/2/2024    Procedure: THORACOTOMY;  Surgeon: Tan Thomsno MD;  Location: [facility] OR [address] [place] [address];  Service: Cardiothoracic;  Laterality: Right;    THORACOTOMY, WITH INITIAL THERAPEUTIC WEDGE RESECTION OF LUNG Right 1/2/2024    Procedure: THORACOTOMY, WITH INITIAL THERAPEUTIC WEDGE RESECTION OF LUNG;  Surgeon: Tan Thomson MD;  Location: [facility] OR [address] [place] [address];  Service: Cardiothoracic;  Laterality: Right;    TONSILLECTOMY      UPPER GASTROINTESTINAL ENDOSCOPY  04/2018       Review of patient's allergies indicates:   Allergen Reactions    Crestor [rosuvastatin] Swelling    Gluten protein        Facility-Administered Medications Prior to Admission   Medication    capsaicin-skin cleanser patch 1 patch    capsaicin-skin cleanser patch 2 patch     PTA Medications   Medication Sig    atorvastatin (LIPITOR) 40 MG tablet Take 1 tablet (40 mg total) by mouth once daily. (Patient taking differently: Take 40 mg by mouth every evening.)    B complex-vitamin C-folic acid (MELLISSA-BENJY) 0.8 mg Tab Take 1 tablet (0.8 mg total) by mouth once daily.    betahistine HCl (BETAHISTINE, BULK, MISC)     blood sugar diagnostic (TRUE METRIX GLUCOSE TEST STRIP) Strp USE ONE STRIP FOR TESTING 2 TIMES A DAY    blood-glucose meter kit Use to test twice a day    calcium-vitamin D3 (OS-CIPRIANO 500 + D3) 500 mg-5 mcg (200 unit) per tablet Take 1 tablet by mouth once daily.    cefpodoxime (VANTIN) 200 MG tablet Take 1 tablet (200 mg total) by mouth every evening. for 13 days    dronabinoL (MARINOL) 5 MG capsule Take 1 capsule (5 mg total) by mouth 2 (two) times daily before meals.    epoetin noble-epbx (RETACRIT) 10,000 unit/mL imjection Inject 1 mL (10,000 Units total) into the skin every Mon, Wed, Fri. HOLD IF HEMOGLOBIN is 10 or GREATER    DO NOT SHAKE  DO NOT DILUTE  DO NOT MIX with other drug solutions    ferrous gluconate (FERGON) 324 MG tablet Take  "1 tablet (324 mg total) by mouth daily with breakfast.    insulin aspart U-100 (NOVOLOG) 100 unit/mL (3 mL) InPn pen Inject before meals:  150-200=+1, 201-250=+2, 251-300=+3; 301-350=+4, over 350=+5 units    lancets Misc 1 lancet by Misc.(Non-Drug; Combo Route) route 4 (four) times daily.    meclizine (ANTIVERT) 25 mg tablet Take 1 tablet (25 mg total) by mouth 3 (three) times daily as needed for Dizziness.    melatonin (MELATIN) 3 mg tablet Take 3 tablets (9 mg total) by mouth nightly as needed for Insomnia.    metoprolol tartrate (LOPRESSOR) 25 MG tablet Take 0.5 tablets (12.5 mg total) by mouth 2 (two) times daily.    metroNIDAZOLE (FLAGYL) 500 MG tablet Take 1 tablet (500 mg total) by mouth every 12 (twelve) hours. for 13 days    midodrine (PROAMATINE) 10 MG tablet Take 1 tablet (10 mg total) by mouth 2 (two) times daily with meals.    nutritional supplements Liqd Take 237 mLs by mouth 4 (four) times daily.    pantoprazole (PROTONIX) 40 MG tablet Take 1 tablet (40 mg total) by mouth 2 (two) times daily before meals.    patiromer calcium sorbitex (VELTASSA) 8.4 gram PwPk Take 2 packets (16.8 g total) by mouth once daily.    pen needle, diabetic (BD ULTRA-FINE MARIS PEN NEEDLE) 32 gauge x 5/32" Ndle 1 Device by Misc.(Non-Drug; Combo Route) route 4 (four) times daily with meals and nightly.    pregabalin (LYRICA) 75 MG capsule Take 1 capsule (75 mg total) by mouth Daily. Give after HD    sevelamer HCL (RENAGEL) 800 MG Tab Take 2 tablets (1,600 mg total) by mouth 3 (three) times daily with meals.    torsemide 40 mg Tab Take 40 mg by mouth once daily.    venlafaxine (EFFEXOR) 37.5 MG Tab Take 1 tablet (37.5 mg total) by mouth once daily.    vit C,Z-Ng-oldhl-lutein-zeaxan (PRESERVISION AREDS 2) 210-468-43-1 mg-unit-mg-mg Cap     vitamins  A,C,E-zinc-copper 14,320-226-200 unit-mg-unit Cap Take 1 tablet by mouth once daily.     Family History       Problem Relation (Age of Onset)    Arthritis Father    Breast cancer " Mother, Paternal Aunt    Cancer Mother, Paternal Aunt    Diabetes Paternal Grandfather    Hyperlipidemia Sister    Vision loss Father, Mother, Sister          Tobacco Use    Smoking status: Never    Smokeless tobacco: Never   Substance and Sexual Activity    Alcohol use: No    Drug use: Never    Sexual activity: Not Currently     Partners: Male     Birth control/protection: See Surgical Hx     Comment: :      Review of Systems   Reason unable to perform ROS: severe dyspnea.     Objective:     Vital Signs (Most Recent):  Temp: 97.7 °F (36.5 °C) (02/14/24 0300)  Pulse: 94 (02/14/24 0515)  Resp: (!) 35 (02/14/24 0515)  BP: 102/67 (02/14/24 0515)  SpO2: 98 % (02/14/24 0515) Vital Signs (24h Range):  Temp:  [97.4 °F (36.3 °C)-98.2 °F (36.8 °C)] 97.7 °F (36.5 °C)  Pulse:  [] 94  Resp:  [15-47] 35  SpO2:  [93 %-100 %] 98 %  BP: ()/(51-84) 102/67     Weight: 67.6 kg (149 lb 0.5 oz)  Body mass index is 24.8 kg/m².    SpO2: 98 %         Intake/Output Summary (Last 24 hours) at 2/14/2024 0804  Last data filed at 2/14/2024 0500  Gross per 24 hour   Intake 3003.15 ml   Output 2129 ml   Net 874.15 ml       Lines/Drains/Airways       Central Venous Catheter Line  Duration             Permacath right subclavian -- days         Hemodialysis Catheter 01/25/23 1501 right internal jugular 384 days              Peripheral Intravenous Line  Duration                  Hemodialysis AV Fistula 09/01/23 0800 Left forearm 166 days         Peripheral IV - Single Lumen 02/12/24 0207 18 G Anterior;Proximal;Right Forearm 2 days         Peripheral IV - Single Lumen 02/12/24 1741 22 G;1 in Anterior;Right Wrist 1 day                     Physical Exam  Constitutional:       General: She is in acute distress.      Appearance: She is ill-appearing.   Cardiovascular:      Rate and Rhythm: Normal rate and regular rhythm.      Comments: Radial pulses not palpable. Cold and clammy extremities. Palpable femoral pulse. Pitting edema in all  extremities.  Pulmonary:      Comments: Increased work of breathing  Abdominal:      Palpations: Abdomen is soft.   Neurological:      General: No focal deficit present.      Mental Status: She is alert.            Significant Labs: All pertinent lab results from the last 24 hours have been reviewed.

## 2024-02-14 NOTE — PROGRESS NOTES
02/14/24 0512   Treatment   Treatment Type SLED   Treatment Status Discontinued treatment;Blood returned   Dialyzer Time (hours) 10.06   BVP (Liters) 117.2 L   CRRT Comments 10hr tx completed.

## 2024-02-14 NOTE — CONSULTS
"Lily Green is a 73 y.o. female with a PMHx of ESRD (TTS), benign essential tremor, Meniere's disease, HTN, HLD, DM2, GERD, gastric ulcer, Celiac's, IBS who was found to have RML Carcinoid tumor. She was admitted on 2/12/2024 for increased work of breathing, dyspnea, and hypoxia, with oxygen saturation in the 70% on 2L. Hospital course notable for hypoxic respiratory failure. Interventional Radiology has been consulted for image guided percutaneous drain placement/aspiration for management of fluid collection in the right paraesophageal space and azygoesophageal recess. Pt has had recent imaging including a CTA on 2/12/2024. The pt has been evaluated by thoracic surgery who noted "This has been seen on prior scans and has not significantly changed, but there is persistent inflammation. This is highly unlikely to be secondary to an esophageal leak or fistula given the time course and normal esophagus noted on EGDs during her prior admission." Imaging was reviewed by Dr. Becker and there is no safe window to access this fluid collection. IR will sign off at this time. Please contact with questions via [employer] secure chat.    MAIK Garcia, FNP  Interventional Radiology  "

## 2024-02-14 NOTE — SUBJECTIVE & OBJECTIVE
Interval History: Continues to remain on 5L NC. Received 10 hrs of SLED last night, held d/t soft BP and afib with RVR. Telemetry this morning showing better rate control. Svo2 was 65 and CVP was 10 this morning. Intervention cardiology was consulted for [facility], no intervention at this time as patient cannot lay flat.   Review of Systems   Constitutional: Negative.   HENT: Negative.     Eyes: Negative.    Cardiovascular:  Positive for dyspnea on exertion.   Respiratory: Negative.     Endocrine: Negative.    Hematologic/Lymphatic: Negative.    Musculoskeletal: Negative.    Gastrointestinal: Negative.    Genitourinary: Negative.    Neurological: Negative.      Objective:     Vital Signs (Most Recent):  Temp: 97.8 °F (36.6 °C) (02/14/24 0815)  Pulse: 88 (02/14/24 1123)  Resp: (!) 39 (02/14/24 1125)  BP: 104/60 (02/14/24 1123)  SpO2: 99 % (02/14/24 1100) Vital Signs (24h Range):  Temp:  [97.4 °F (36.3 °C)-98.2 °F (36.8 °C)] 97.8 °F (36.6 °C)  Pulse:  [] 88  Resp:  [15-48] 39  SpO2:  [93 %-100 %] 99 %  BP: ()/(51-83) 104/60     Weight: 67.6 kg (149 lb 0.5 oz)  Body mass index is 24.8 kg/m².     SpO2: 99 %         Intake/Output Summary (Last 24 hours) at 2/14/2024 1141  Last data filed at 2/14/2024 0500  Gross per 24 hour   Intake 2935.72 ml   Output 2129 ml   Net 806.72 ml         Lines/Drains/Airways       Central Venous Catheter Line  Duration             Permacath right subclavian -- days         Hemodialysis Catheter 01/25/23 1501 right internal jugular 384 days              Peripheral Intravenous Line  Duration                  Hemodialysis AV Fistula 09/01/23 0800 Left forearm 166 days         Peripheral IV - Single Lumen 02/12/24 0207 18 G Anterior;Proximal;Right Forearm 2 days         Peripheral IV - Single Lumen 02/12/24 1741 22 G;1 in Anterior;Right Wrist 1 day                       Physical Exam  Constitutional:       Appearance: She is ill-appearing and toxic-appearing.   HENT:      Head:  Normocephalic.   Cardiovascular:      Rate and Rhythm: Regular rhythm. Tachycardia present.      Pulses: Normal pulses.      Comments: Gallop present  Pulmonary:      Effort: Pulmonary effort is normal.      Breath sounds: Wheezing present.      Comments: Mild crackles present at the bases  Abdominal:      General: Abdomen is flat. Bowel sounds are normal.      Palpations: Abdomen is soft.   Musculoskeletal:         General: Normal range of motion.      Cervical back: Normal range of motion.   Skin:     General: Skin is warm.      Capillary Refill: Capillary refill takes less than 2 seconds.   Neurological:      General: No focal deficit present.      Mental Status: She is alert and oriented to person, place, and time.            Significant Labs: All pertinent lab results from the last 24 hours have been reviewed.    Significant Imaging: Echocardiogram: Transthoracic echo (TTE) complete (Cupid Only):   Results for orders placed or performed during the hospital encounter of 02/12/24   Echo   Result Value Ref Range    RA Width 3.99 cm    Left Atrium Major Axis 5.32 cm    Left Atrium Minor Axis 4.47 cm    RA Major Axis 5.10 cm    LV Diastolic Volume 103.69 mL    LV Systolic Volume 66.01 mL    Ao VTI 12.80 cm    Ao peak nadiya 1.11 m/s    LVOT peak VTI 12.14 cm    LVOT peak nadiya 1.01 m/s    LVOT diameter 1.90 cm    AV mean gradient 4 mmHg    TAPSE 1.34 cm    RVDD 2.27 cm    LA size 3.69 cm    Ascending aorta 3.35 cm    STJ 2.91 cm    Sinus 2.98 cm    LVIDs 3.90 2.1 - 4.0 cm    Posterior Wall 1.07 0.6 - 1.1 cm    IVS 0.80 0.6 - 1.1 cm    LVIDd 4.73 3.5 - 6.0 cm    LA WIDTH 4.15 cm    FS 18 (A) 28 - 44 %    LA volume 63.24 cm3    LV mass 151.75 g    Left Ventricle Relative Wall Thickness 0.45 cm    AV valve area 2.69 cm²    AV Velocity Ratio 0.91     AV index (prosthetic) 0.95     LVOT area 2.8 cm2    LVOT stroke volume 34.40 cm3    AV peak gradient 5 mmHg    IVANNA by Velocity Ratio 2.58 cm²    EF 15 %    Est. RA pres 8 mmHg     Narrative      Left Ventricle: The left ventricle is normal in size. Normal wall   thickness. Regional wall motion abnormalities present. See diagram for   wall motion findings. There is severely reduced systolic function with a   visually estimated ejection fraction of 15 - 20%. Ejection fraction by   visual approximation is 15%. Unable to assess diastolic function due to   atrial fibrillation.    Right Ventricle: Normal right ventricular cavity size. Wall thickness   is normal. Right ventricle wall motion  is normal. Systolic function is   normal.    Left Atrium: Left atrium is severely dilated.    Right Atrium: Right atrium is mildly dilated.    Aortic Valve: There is moderate aortic valve sclerosis. Mildly   restricted motion.    Mitral Valve: There is moderate to severe regurgitation.    Tricuspid Valve: There is mild regurgitation.    IVC/SVC: Intermediate venous pressure at 8 mmHg.    Pericardium: There is a trivial effusion. Left pleural effusion.      and X-Ray: CXR: X-Ray Chest PA and Lateral (CXR): No results found for this visit on 02/12/24.

## 2024-02-14 NOTE — PT/OT/SLP PROGRESS
Physical Therapy      Patient Name:  Lily Green   MRN:  [id number]    Patient not seen today secondary to: increased respiratory rate (44) while sleeping. Will reassess status 2/15.    2/14/2024

## 2024-02-14 NOTE — PROGRESS NOTES
02/14/24 1541   Treatment   Treatment Type SCUF   Treatment Status Restart   Dialysis Machine Number [id number]   Dialyzer Time (hours) 0   BVP (Liters) 0 L   Solutions Labeled and Current  Yes   Access Tunneled Cath;Right;IJ   Catheter Dressing Intact  Yes   Alarms Engaged Yes   CRRT Comments scuf restarted as ordered   Prescription   Time (Hours) 12   Dialysate K + (mEq/L) Other (Comment)   Dialysate CA + (mEq/L) Other (Comment)   Dialysate HCO3 - (Bicarb) (mEq/L) Other (Comment)   Dialysate Na + (mEq/L) Other (comment)   Cartridge Type Other  (r300)   Dialysate Flow Rate (mL/min) 0(seq)   UF Goal Rate 350 mL/hr   CRRT Hourly Documentation   Blood Flow (mL/min) 200   UF Rate 300 cc/hr   Arterial Pressure (mmHg) -30 mmHg   Venous Pressure (mmHg) 60 mmHg   Effluent Pressure (EP) (mmHg) 20 mmHg   Total UF (Hourly Cleared) (mL) 0     SCUF restarted as ordered. RIJ permacath aspirated, flushed, and accessed using aseptic technique. Lines connected and secured.

## 2024-02-14 NOTE — PROGRESS NOTES
02/14/24 0115   Treatment   Treatment Type SLED   Treatment Status Restart   Dialysis Machine Number [id number]   Dialyzer Time (hours) 5.17   BVP (Liters) 60.5 L   Solutions Labeled and Current  Yes   Access Tunneled Cath;Right;IJ   Catheter Dressing Intact  Yes   Alarms Engaged Yes   CRRT Comments CRRT tx restarted post clotting.

## 2024-02-14 NOTE — PLAN OF CARE
[facility] - [facility]  Initial Discharge Assessment       Primary Care Provider: Pavel Calixto MD    Admission Diagnosis: Shortness of breath [R06.02]  Pericardial effusion [I31.39]  Acute hypoxemic respiratory failure [J96.01]    Admission Date: 2/12/2024  Expected Discharge Date:     Transition of Care Barriers: None    Payor: HUMANA MANAGED MEDICARE / Plan: HUMANA MEDICARE PPO / Product Type: Medicare Advantage /     Extended Emergency Contact Information  Primary Emergency Contact: Deric Green   [place]  Mobile Phone: [phone]  Relation: Spouse  Secondary Emergency Contact: DeancliflexieSweetie   [facility]  Home Phone: [phone]  Mobile Phone: [phone]  Relation: Sister    Discharge Plan A: Skilled Nursing Facility  Discharge Plan B: Home with family      [id number] - [place], [place] - [address]  [address]  [place] [address]  Phone: [phone] Fax: [phone]    [id number] - [place], [place] - [address] AT [facility] [address]  [address]  [place] [address]  Phone: [phone] Fax: [phone]      Initial Assessment (most recent)       Adult Discharge Assessment - 02/14/24 1055          Discharge Assessment    Assessment Type Discharge Planning Assessment     Confirmed/corrected address, phone number and insurance Yes     Confirmed Demographics Correct on Facesheet     Source of Information patient;family     Does patient/caregiver understand observation status Yes     Communicated CAMPBELL with patient/caregiver Yes     Reason For Admission Acute hypoxemic respiratory failure     People in Home alone     Do you expect to return to your current living situation? Yes   SNF    Do you have help at home or someone to help you manage your care at home? Yes     Prior to hospitilization cognitive status: Alert/Oriented     Current cognitive status: Alert/Oriented     Readmission within 30 days? No     Patient currently  being followed by outpatient case management? No     Do you currently have service(s) that help you manage your care at home? Yes     Is the pt/caregiver preference to resume services with current agency Yes     Do you take prescription medications? Yes     Do you have prescription coverage? Yes     Coverage Payor: HUMANA MANAGED MEDICARE - HUMANA MEDICARE PPO -     Do you have any problems affording any of your prescribed medications? No     Is the patient taking medications as prescribed? yes     How do you get to doctors appointments? family or friend will provide     Are you on dialysis? No     Do you take coumadin? No     Discharge Plan A Skilled Nursing Facility     Discharge Plan B Home with family     Discharge Plan discussed with: Spouse/sig other;Patient     Transition of Care Barriers None        Physical Activity    On average, how many days per week do you engage in moderate to strenuous exercise (like a brisk walk)? 0 days     On average, how many minutes do you engage in exercise at this level? 0 min        Financial Resource Strain    How hard is it for you to pay for the very basics like food, housing, medical care, and heating? Not hard at all        Housing Stability    In the last 12 months, was there a time when you were not able to pay the mortgage or rent on time? No     In the last 12 months, how many places have you lived? 1     In the last 12 months, was there a time when you did not have a steady place to sleep or slept in a shelter (including now)? No        Transportation Needs    In the past 12 months, has lack of transportation kept you from medical appointments or from getting medications? No     In the past 12 months, has lack of transportation kept you from meetings, work, or from getting things needed for daily living? No        Food Insecurity    Within the past 12 months, you worried that your food would run out before you got the money to buy more. Never true     Within the past  12 months, the food you bought just didn't last and you didn't have money to get more. Never true        Stress    Do you feel stress - tense, restless, nervous, or anxious, or unable to sleep at night because your mind is troubled all the time - these days? To some extent        Social Connections    In a typical week, how many times do you talk on the phone with family, friends, or neighbors? More than three times a week     How often do you get together with friends or relatives? More than three times a week     How often do you attend Quaker or Pentecostalism services? More than 4 times per year     Do you belong to any clubs or organizations such as Quaker groups, unions, fraternal or athletic groups, or school groups? No     How often do you attend meetings of the clubs or organizations you belong to? Never     Are you , , , , never , or living with a partner?         Alcohol Use    Q1: How often do you have a drink containing alcohol? Never     Q2: How many drinks containing alcohol do you have on a typical day when you are drinking? Patient does not drink     Q3: How often do you have six or more drinks on one occasion? Never                   Spoke to pt. Pt lives at home with self but has full time care givers. Post hospital  stay  and family will be pt support person and pt. has transportation at d/c with family. Verified pt PCP and preferred pharmacy. Pt stated not on Coumadin and is not receiving dialysis. All questions answered regarding case management/ discharge planning , pt verbalized understanding. Discharge booklet with SW contact information given to pt.     Discharge Plan A and Plan B have been determined by review of patient's clinical status, future medical and therapeutic needs, and coverage/benefits for post-acute care in coordination with multidisciplinary team members.        GRAYSON Savage  [facility] [place] [facility]  [phone]

## 2024-02-14 NOTE — ASSESSMENT & PLAN NOTE
Nutrition consulted. Most recent weight and BMI monitored-     Measurements:  Wt Readings from Last 1 Encounters:   02/12/24 67.6 kg (149 lb 0.5 oz)   Body mass index is 24.8 kg/m².    Patient has been screened and assessed by RD.    Malnutrition Type:  Context: chronic illness  Level: moderate    Malnutrition Characteristic Summary:  Energy Intake (Malnutrition): less than 75% for greater than or equal to 1 month  Muscle Mass (Malnutrition): mild depletion  Fluid Accumulation (Malnutrition): severe    Interventions/Recommendations (treatment strategy):  1. Continue gluten free diet with texture per SLP  2. modify O[place] to [facility] [place] [facility]  3. RD to monitor and follow

## 2024-02-14 NOTE — CONSULTS
Palliative medicine consult received for goals of care and advanced care planning. Chart reveiwed and patient discussed with primary team NP - resuscitation  status conversation goal of consult.      Pal med to see patient in the AM.  Thank you for consult and opportunity to participate in Mrs. Green's care.

## 2024-02-14 NOTE — SUBJECTIVE & OBJECTIVE
Interval History/Significant Events:  Patient had a couple episodes of RVR overnight--increase metoprolol this morning.  SLED 10 hours overnight with removal of 2.1 L.  CT Surgery consult for fluid collection in the right paraesophageal space and azygoesophageal recess--recommended Interventional Radiology consultation for possible drainage.  IR reports no safe window for access.    Review of Systems   Respiratory:  Positive for shortness of breath.    Cardiovascular:  Negative for chest pain.   All other systems reviewed and are negative.    Objective:     Vital Signs (Most Recent):  Temp: 97.7 °F (36.5 °C) (02/14/24 0300)  Pulse: 94 (02/14/24 0515)  Resp: (!) 35 (02/14/24 0515)  BP: 102/67 (02/14/24 0515)  SpO2: 98 % (02/14/24 0515) Vital Signs (24h Range):  Temp:  [97.4 °F (36.3 °C)-98.2 °F (36.8 °C)] 97.7 °F (36.5 °C)  Pulse:  [] 94  Resp:  [15-47] 35  SpO2:  [93 %-100 %] 98 %  BP: ()/(51-84) 102/67   Weight: 67.6 kg (149 lb 0.5 oz)  Body mass index is 24.8 kg/m².      Intake/Output Summary (Last 24 hours) at 2/14/2024 0807  Last data filed at 2/14/2024 0500  Gross per 24 hour   Intake 3003.15 ml   Output 2129 ml   Net 874.15 ml            Physical Exam  Vitals and nursing note reviewed.   Constitutional:       General: She is not in acute distress.     Appearance: She is normal weight. She is not ill-appearing, toxic-appearing or diaphoretic.   HENT:      Head: Normocephalic and atraumatic.      Right Ear: External ear normal.      Left Ear: External ear normal.      Nose: Nose normal.   Cardiovascular:      Rate and Rhythm: Tachycardia present. Rhythm irregular.      Pulses: Normal pulses.      Heart sounds: Normal heart sounds. No murmur heard.     No friction rub. No gallop.   Pulmonary:      Comments: Moderately increased respiratory effort with bilateral crackles  Abdominal:      General: Abdomen is flat. Bowel sounds are normal. There is no distension.      Palpations: Abdomen is soft.       Tenderness: There is no abdominal tenderness. There is no guarding or rebound.   Musculoskeletal:         General: Swelling present. No tenderness. Normal range of motion.   Skin:     General: Skin is warm and dry.      Coloration: Skin is not jaundiced or pale.   Neurological:      General: No focal deficit present.      Mental Status: She is alert and oriented to person, place, and time. Mental status is at baseline.   Psychiatric:         Mood and Affect: Mood normal.         Behavior: Behavior normal.         Thought Content: Thought content normal.         Judgment: Judgment normal.            Vents:  Oxygen Concentration (%): 32 (02/13/24 0019)  Lines/Drains/Airways       Central Venous Catheter Line  Duration             Permacath right subclavian -- days         Hemodialysis Catheter 01/25/23 1501 right internal jugular 384 days              Peripheral Intravenous Line  Duration                  Hemodialysis AV Fistula 09/01/23 0800 Left forearm 166 days         Peripheral IV - Single Lumen 02/12/24 0207 18 G Anterior;Proximal;Right Forearm 2 days         Peripheral IV - Single Lumen 02/12/24 1741 22 G;1 in Anterior;Right Wrist 1 day                  Significant Labs:    CBC/Anemia Profile:  Recent Labs   Lab 02/13/24  0334 02/14/24  0305   WBC 11.06 9.83   HGB 9.2* 8.4*   HCT 29.6* 27.2*    188   MCV 96 97   RDW 22.6* 22.5*          Chemistries:  Recent Labs   Lab 02/13/24  0334 02/13/24  1354 02/13/24  2201 02/14/24  0305   *  131* 130* 130* 129*  129*   K 4.3  4.3 3.9 4.3 4.2  4.2   CL 97  97 98 98 97  97   CO2 23  23 22* 23 22*  22*   BUN 9  9 15 9 5*  5*   CREATININE 1.1  1.1 1.5* 1.1 0.8  0.8   CALCIUM 8.1*  8.1* 8.0* 7.8* 7.4*  7.4*   ALBUMIN 2.1*  2.1* 2.1* 2.1* 2.0*  2.0*   PROT 6.4  --   --  5.7*   BILITOT 0.3  --   --  0.3   ALKPHOS 93  --   --  88   ALT 10  --   --  9*   AST 29  --   --  18   MG 2.1  2.1 1.8 2.1 1.9  1.9   PHOS 3.7  3.7 4.4 2.4* 3.0  3.0          All pertinent labs within the past 24 hours have been reviewed.    Significant Imaging:  I have reviewed all pertinent imaging results/findings within the past 24 hours.

## 2024-02-14 NOTE — PROGRESS NOTES
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  She was on meropenem, then transitioned to ceftriaxone and metronidazole.  She was ultimately discharged on cefpodoxime and metronidazole with a plan to complete 4 weeks of antibiotics on February 15.  She is now re-admitted with respiratory distress.  ID consulted to assist with her care.    CT scan reviewed.  Per radiology read, there is a small amount of fluid and gas with associated inflammatory changes in the azygoesophageal recess.  CT surgery and IR evaluated and no invasive procedures can be performed for this.      Spoke to nephrology service about utility of draining the pleural fluid.  This may be helpful in terms of symptom relief as well as for diagnostic purposes.  Fluid should be sent for cell count, diff, LDH, gram stain, culture.    Will continue on vancomycin and zosyn for now.        Anticipated Disposition: TBD    Thank you for your consult. I will follow-up with patient. Please contact us if you have any additional questions.    Shayan Christensen MD  Infectious Disease  [facility] - Surgical Intensive Care    Subjective:     Principal Problem:Acute hypoxemic respiratory failure    HPI: 73 year old female with a history of DM2, hyperlipidemia, and right middle lobe carcinoid tumor s/p open thoracotomy with wedge resection to right middle lobe and lymph node resection on January 2, 2024.  Her post surgical course was  complicated by bilateral pleural effusions and respiratory cultures that were positive for  Klebsiella pneumoniae and E coli.  She was on meropenem initially but was later transitioned to ceftriaxone and metronidazole.  She also underwent leadless pacemaker placement on January 16, 2024 during that hospital stay.  After around 35 days in the hospital, she was discharged from the hospital on cefpodoxime and metronidazole around February 6.  She is re-admitted with acute shortness of breath.  Repeat chest imaging shows trace bilateral pleural effusions.  CT also shows some small amount of fluid and gas with inflammatory change in the azygoesphageal recess.  ID is consulted to evaluate the patient.  Interval History: remains critically ill    Review of Systems   Unable to perform ROS: Acuity of condition     Objective:     Vital Signs (Most Recent):  Temp: 98.5 °F (36.9 °C) (02/14/24 1200)  Pulse: 87 (02/14/24 1345)  Resp: (!) 26 (02/14/24 1345)  BP: (!) 110/59 (02/14/24 1330)  SpO2: 98 % (02/14/24 1345) Vital Signs (24h Range):  Temp:  [97.4 °F (36.3 °C)-98.5 °F (36.9 °C)] 98.5 °F (36.9 °C)  Pulse:  [] 87  Resp:  [15-50] 26  SpO2:  [93 %-100 %] 98 %  BP: ()/(51-82) 110/59     Weight: 67.6 kg (149 lb)  Body mass index is 24.79 kg/m².    Estimated Creatinine Clearance: 56.4 mL/min (based on SCr of 0.8 mg/dL).     Physical Exam  Vitals and nursing note reviewed.   Constitutional:       General: She is in acute distress (visibly short of breath).      Appearance: She is well-developed. She is ill-appearing. She is not diaphoretic.   HENT:      Head: Normocephalic and atraumatic.      Right Ear: External ear normal.      Left Ear: External ear normal.      Nose: Nose normal.      Mouth/Throat:      Pharynx: No oropharyngeal exudate.   Eyes:      General: No scleral icterus.        Right eye: No discharge.         Left eye: No discharge.      Conjunctiva/sclera: Conjunctivae normal.      Pupils: Pupils are  equal, round, and reactive to light.   Neck:      Thyroid: No thyromegaly.      Vascular: No JVD.      Trachea: No tracheal deviation.   Cardiovascular:      Rate and Rhythm: Normal rate and regular rhythm.      Heart sounds: No murmur heard.     No friction rub. No gallop.   Pulmonary:      Effort: No respiratory distress.      Breath sounds: Normal breath sounds. No stridor. No wheezing or rales.      Comments: Tachypneic. Visibly short of breath    Chest:      Chest wall: No tenderness.   Abdominal:      General: Bowel sounds are normal. There is no distension.      Palpations: Abdomen is soft. There is no mass.      Tenderness: There is no abdominal tenderness. There is no guarding or rebound.   Musculoskeletal:         General: No tenderness. Normal range of motion.      Cervical back: Normal range of motion and neck supple.   Lymphadenopathy:      Cervical: No cervical adenopathy.   Skin:     General: Skin is warm.      Coloration: Skin is not pale.      Findings: No erythema or rash.   Neurological:      Mental Status: She is alert and oriented to person, place, and time.      Cranial Nerves: No cranial nerve deficit.      Motor: No abnormal muscle tone.      Coordination: Coordination normal.      Deep Tendon Reflexes: Reflexes normal.          Significant Labs:   Microbiology Results (last 7 days)       Procedure Component Value Units Date/Time    Blood culture [[id number]] Collected: 02/12/24 0526    Order Status: Completed Specimen: Blood from Peripheral, Antecubital, Right Updated: 02/14/24 0812     Blood Culture, Routine No Growth to date      No Growth to date      No Growth to date    Blood culture [[id number]] Collected: 02/12/24 0526    Order Status: Completed Specimen: Blood from Peripheral, Upper Arm, Right Updated: 02/14/24 0812     Blood Culture, Routine No Growth to date      No Growth to date      No Growth to date            Significant Imaging: I have reviewed all pertinent imaging  results/findings within the past 24 hours.

## 2024-02-14 NOTE — NURSING
2/13 2240-Pt went into Afib with HR in the 140's for approx 1 minute. Pt HR now sustaining 110's with systolics sustaining 100-110's. DNP aware, no new orders at this time.    2/14 0400- Pt went into Afib RVR with HR sustaining in the 120/130's, systolics in the 80's and map of 65. Stat EKG ordered and obtained. Pt HR returned to the 80's and the Afib resolved, Plans to increase lopressor with her daily meds.

## 2024-02-14 NOTE — PROGRESS NOTES
[facility]  Nephrology  Progress Note    Patient Name: Lily Green  MRN: [id number]  Admission Date: 2/12/2024  Hospital Length of Stay: 2 days  Attending Provider: Meghan Trujillo MD   Primary Care Physician: Pavel Calixto MD  Principal Problem:Acute hypoxemic respiratory failure    Subjective:     HPI: 73 year old female with a histoyr of ESRD(MW), HTN, HLD, T2DM, IBS, RML carcinoid tumor [place] [facility] [place] distress. Initially thought to be from volume overload, however emergent HD done overnight resulted in Afib with RVR. She initially presented with hypoxic resp failure requiring NC satting 70% with 2 liters. MICU was called and bipap was started with improvement in respiratory status.     She was previously [place] [facility] and underwent a right thoracotomy for newly found RML carcinoid tumor. Course was complicated by right sided chest tube placement and new onset afib with RVR and GIB (EGD with gastric ulcers).     Outpatient ESRD  Hemodialysis  Outpatient nephrologist: Dr. Vyas  Outpatient center: [facility]  Dialysis schedule: [facility]  Last treatment: 2/10/24  Anuric: Minimal  Access: right tunnled dialysis cather; has left fistula, pending maturation      Interval History: overnight SLED though remains positive on I/O - unable to pull more UF due to blood pressure    Review of patient's allergies indicates:   Allergen Reactions    Crestor [rosuvastatin] Swelling    Gluten protein      Current Facility-Administered Medications   Medication Frequency    0.9%  NaCl infusion (CRRT USE ONLY) Continuous    0.9%  NaCl infusion Once    0.9%  NaCl infusion Continuous    acetaminophen tablet 650 mg Q4H PRN    albuterol-ipratropium 2.5 mg-0.5 mg/3 mL nebulizer solution 3 mL Q4H PRN    amiodarone (CORDARONE) 900 mg in dextrose 5 % (D5W) 250 mL (3.6 mg/mL) IV infusion - HIGH Continuous    atorvastatin tablet 40 mg QHS    dextrose 10% bolus 125 mL 125 mL PRN     dextrose 10% bolus 250 mL 250 mL PRN    droNABinol capsule 5 mg BID AC    ferrous gluconate tablet 324 mg Daily with breakfast    glucagon (human recombinant) injection 1 mg PRN    glucose chewable tablet 16 g PRN    glucose chewable tablet 24 g PRN    heparin (porcine) injection 5,000 Units Q8H    insulin aspart U-100 pen 0-5 Units QID (AC + HS) PRN    magnesium sulfate 2g in water 50mL IVPB (premix) PRN    melatonin tablet 6 mg Nightly PRN    metoprolol tartrate (LOPRESSOR) tablet 50 mg BID    mupirocin 2 % ointment BID    ondansetron injection 4 mg Q6H PRN    pantoprazole EC tablet 40 mg BID AC    piperacillin-tazobactam (ZOSYN) 4.5 g in dextrose 5 % in water (D5W) 100 mL IVPB (MB+) Q12H    pregabalin capsule 75 mg Daily    sodium chloride 0.9% flush 10 mL PRN    sodium phosphate 20.01 mmol in dextrose 5 % (D5W) 250 mL IVPB PRN    sodium phosphate 30 mmol in dextrose 5 % (D5W) 250 mL IVPB PRN    sodium phosphate 39.99 mmol in dextrose 5 % (D5W) 250 mL IVPB PRN    venlafaxine tablet 37.5 mg Daily       Objective:     Vital Signs (Most Recent):  Temp: 97.8 °F (36.6 °C) (02/14/24 0815)  Pulse: 95 (02/14/24 0845)  Resp: (!) 45 (02/14/24 0845)  BP: 108/62 (02/14/24 0830)  SpO2: 97 % (02/14/24 0845) Vital Signs (24h Range):  Temp:  [97.4 °F (36.3 °C)-98.2 °F (36.8 °C)] 97.8 °F (36.6 °C)  Pulse:  [] 95  Resp:  [15-48] 45  SpO2:  [93 %-100 %] 97 %  BP: ()/(51-84) 108/62     Weight: 67.6 kg (149 lb 0.5 oz) (02/12/24 0830)  Body mass index is 24.8 kg/m².  Body surface area is 1.76 meters squared.    I/O last 3 completed shifts:  In: 5571.9 [I.V.:4447.1; IV Piggyback:1124.9]  Out: 4772 [Other:4772]     Physical Exam  Constitutional:       General: She is not in acute distress.     Appearance: She is not ill-appearing or toxic-appearing.   HENT:      Head: Normocephalic and atraumatic.      Nose: Nose normal.   Cardiovascular:      Comments: Right chest tunneled dialysis catheter  Pulmonary:      Effort:  Respiratory distress present.      Breath sounds: Rhonchi present.   Abdominal:      General: Abdomen is flat. There is no distension.      Tenderness: There is no abdominal tenderness.   Musculoskeletal:      Right lower leg: No edema.      Left lower leg: No edema.      Comments: Left upper AVG, good thrill and hum   Skin:     General: Skin is warm.      Coloration: Skin is not jaundiced.      Findings: No bruising.   Neurological:      Mental Status: She is alert.          Significant Labs:  All labs within the past 24 hours have been reviewed.     Significant Imaging:  Labs: Reviewed  Assessment/Plan:     Renal/  ESRD (end stage renal disease)  ESRD   Hypoxic resp failure - improved with 1.5 liter removal;   ECHO 2/12 with newly depressed EF of 10-15% from 60-65% one month prior    Plan/Recommendation  -SCUF now to assist with respiratory distress.   -Keep MAP > 65  -Keep hemoglobin > 7  -Strict ins and outs  -Avoid nephrotoxic agents if possible and renally dose medications  -Avoid drastic hemodynamic changes if possible        Oncology  Anemia in ESRD (end-stage renal disease)  Goal hemoglobin in ESRD is 10-12  Hemoglobin   Date Value Ref Range Status   02/14/2024 8.4 (L) 12.0 - 16.0 g/dL Final     Iron   Date Value Ref Range Status   02/02/2024 52 30 - 160 ug/dL Final     Transferrin   Date Value Ref Range Status   02/02/2024 121 (L) 200 - 375 mg/dL Final     TIBC   Date Value Ref Range Status   02/02/2024 179 (L) 250 - 450 ug/dL Final     Saturated Iron   Date Value Ref Range Status   02/02/2024 29 20 - 50 % Final     Ferritin   Date Value Ref Range Status   02/02/2024 1,803 (H) 20.0 - 300.0 ng/mL Final           Thank you for your consult. I will follow-up with patient. Please contact us if you have any additional questions.    Johnson Hernandez MD  Nephrology  [facility]

## 2024-02-14 NOTE — CONSULTS
[facility]  Interventional Cardiology  Consult Note    Patient Name: Lily Green  MRN: [id number]  Admission Date: 2024  Hospital Length of Stay: 2 days  Code Status: Full Code   Attending Provider: Meghan Trujillo MD  Consulting Provider: Darcy Ogden MD  Primary Care Physician: Pavel Calixto MD  Principal Problem:Acute hypoxemic respiratory failure    Patient information was obtained from spouse/SO, past medical records, and primary team.     Inpatient consult to Interventional Cardiology  Consult performed by: Darcy Ogden MD  Consult ordered by: Grey Delcid MD        Subjective:     Chief Complaint:  dyspnea     HPI:  72 y/o F with ESRD on dialysis MWF, RML carcinoid tumor s/p resection last month, atrial fibrillation with bradycardia s/p leadless PPM who was admitted for respiratory failure. Hypoxia resolved with dialysis but she remains tachypneic and developed afib w/RVR. Echo revealed newly reduced EF 15-20% with mod-severe MR. Initially there was concern for shock but lactate < 2 and CI > 2. IC consulted for evaluation of newly reduced EF. Of note, CTA shows loculated effusions.    Past Medical History:   Diagnosis Date    Anxiety     Celiac disease 2018    Celiac disease     Depression     Diabetes mellitus     Family history of breast cancer in mother      at age 68    Hyperlipidemia     Hypertension     Meniere disease     Meniere's disease, unspecified ear     Menopause     Peptic ulcer     Reflux esophagitis     Urinary tract infection     Vaginal infection     Vaginal Pap smear 2014    Pap/Hpv Negative        Past Surgical History:   Procedure Laterality Date    APPENDECTOMY      BREAST SURGERY      Tags    CARPAL TUNNEL RELEASE Bilateral 2017    ,     CLOSURE, COLOSTOMY Left 2023    Procedure: CLOSURE, COLOSTOMY;  Surgeon: Manuel Javier MD;  Location: [facility];  Service: General;  Laterality: Left;     COLONOSCOPY  04/2018    Normal  ( Juan A)     COLOSTOMY Right 1/16/2023    Procedure: CREATION, COLOSTOMY;  Surgeon: Manuel Javier MD;  Location: [facility] OR;  Service: General;  Laterality: Right;    DILATION AND CURETTAGE OF UTERUS  1986    DUPUYTREN CONTRACTURE RELEASE Bilateral 09/2017    ESOPHAGOGASTRODUODENOSCOPY N/A 1/24/2024    Procedure: EGD (ESOPHAGOGASTRODUODENOSCOPY);  Surgeon: Yamilet Walters MD;  Location: [facility] ENDO (2ND FLR);  Service: Gastroenterology;  Laterality: N/A;    ESOPHAGOGASTRODUODENOSCOPY N/A 1/26/2024    Procedure: EGD (ESOPHAGOGASTRODUODENOSCOPY);  Surgeon: Yamilet Walters MD;  Location: [facility] ENDO (2ND FLR);  Service: Gastroenterology;  Laterality: N/A;    HYSTERECTOMY  1987    BEAU w/ appy, no BSO     IMPLANTATION OF LEADLESS PACEMAKER N/A 1/16/2024    Procedure: INSERTION, CARDIAC PACEMAKER, LEADLESS;  Surgeon: LENIN Frances MD;  Location: [facility] EP LAB;  Service: Cardiology;  Laterality: N/A;  SB, LEADLESS PPM (MICRA), MDT, ANES, EH, CVICU [id number]    INJECTION OF NEUROLYTIC AGENT AROUND LUMBAR SYMPATHETIC NERVE N/A 1/6/2022    Procedure: BLOCK, LUMBAR SYMPATHETIC;  Surgeon: Souleymane Rizo Jr., MD;  Location: [facility] PAIN MGT;  Service: Pain Management;  Laterality: N/A;  no pacemaker   pt is diabetic     INJECTION OF NEUROLYTIC AGENT AROUND LUMBAR SYMPATHETIC NERVE N/A 8/25/2022    Procedure: BLOCK, LUMBAR SYMPATHETIC;  Surgeon: Souleymane Rizo Jr., MD;  Location: [facility] PAIN MGT;  Service: Pain Management;  Laterality: N/A;  diabetic     INSERTION OF TUNNELED CENTRAL VENOUS HEMODIALYSIS CATHETER Right 1/25/2023    Procedure: INSERTION, CATHETER, HEMODIALYSIS, DUAL LUMEN;  Surgeon: Manuel Javier MD;  Location: [facility] OR;  Service: General;  Laterality: Right;    INSERTION, CATHETER, TUNNELED Left 1/28/2023    Procedure: Insertion,catheter,tunneled;  Surgeon: Watson Fontenot MD;  Location: [facility] OR;  Service: General;  Laterality: Left;    LAPAROTOMY, EXPLORATORY N/A  1/14/2023    Procedure: LAPAROTOMY, EXPLORATORY;  Surgeon: Manuel Javier MD;  Location: [facility] OR;  Service: General;  Laterality: N/A;    LAPAROTOMY, EXPLORATORY N/A 1/16/2023    Procedure: LAPAROTOMY, EXPLORATORY;  Surgeon: Manuel Javier MD;  Location: [facility] OR;  Service: General;  Laterality: N/A;    LYMPHADENECTOMY Right 1/2/2024    Procedure: LYMPHADENECTOMY;  Surgeon: Tan Thomson MD;  Location: [facility] OR [facility] FLR;  Service: Cardiothoracic;  Laterality: Right;    PACEMAKER, TEMPORARY, TRANSVENOUS, PEDIATRIC Right 1/12/2024    Procedure: Pacemaker, Temporary, Transvenous;  Surgeon: Todd Carlisle MD;  Location: [facility] CATH LAB;  Service: Cardiology;  Laterality: Right;    REMOVAL OF CATHETER Right 1/28/2023    Procedure: REMOVAL, CATHETER;  Surgeon: Watson Fontenot MD;  Location: [facility] OR;  Service: General;  Laterality: Right;    REMOVAL, TUNNELED CATH Right 1/25/2023    Procedure: REMOVAL, TUNNELED CATH;  Surgeon: Manuel Javier MD;  Location: [facility] OR;  Service: General;  Laterality: Right;    ROBOTIC BRONCHOSCOPY N/A 10/20/2023    Procedure: ROBOTIC BRONCHOSCOPY;  Surgeon: Mayda Monzon MD;  [facility];  Service: Pulmonary;  Laterality: N/A;    SHOULDER SURGERY  2009 & 2010    right rotator cuff    THORACOTOMY Right 1/2/2024    Procedure: THORACOTOMY;  Surgeon: Tan Thomson MD;  [facility];  Service: Cardiothoracic;  Laterality: Right;    THORACOTOMY, WITH INITIAL THERAPEUTIC WEDGE RESECTION OF LUNG Right 1/2/2024    Procedure: THORACOTOMY, WITH INITIAL THERAPEUTIC WEDGE RESECTION OF LUNG;  Surgeon: Tan Thomson MD;  Location: [facility] OR 2ND FLR;  Service: Cardiothoracic;  Laterality: Right;    TONSILLECTOMY      UPPER GASTROINTESTINAL ENDOSCOPY  04/2018       Review of patient's allergies indicates:   Allergen Reactions    Crestor [rosuvastatin] Swelling    Gluten protein        Facility-Administered Medications Prior to Admission   Medication     capsaicin-skin cleanser patch 1 patch    capsaicin-skin cleanser patch 2 patch     PTA Medications   Medication Sig    atorvastatin (LIPITOR) 40 MG tablet Take 1 tablet (40 mg total) by mouth once daily. (Patient taking differently: Take 40 mg by mouth every evening.)    B complex-vitamin C-folic acid (MELLISSA-BENJY) 0.8 mg Tab Take 1 tablet (0.8 mg total) by mouth once daily.    betahistine HCl (BETAHISTINE, BULK, MISC)     blood sugar diagnostic (TRUE METRIX GLUCOSE TEST STRIP) Strp USE ONE STRIP FOR TESTING 2 TIMES A DAY    blood-glucose meter kit Use to test twice a day    calcium-vitamin D3 (OS-CIPRIANO 500 + D3) 500 mg-5 mcg (200 unit) per tablet Take 1 tablet by mouth once daily.    cefpodoxime (VANTIN) 200 MG tablet Take 1 tablet (200 mg total) by mouth every evening. for 13 days    dronabinoL (MARINOL) 5 MG capsule Take 1 capsule (5 mg total) by mouth 2 (two) times daily before meals.    epoetin noble-epbx (RETACRIT) 10,000 unit/mL imjection Inject 1 mL (10,000 Units total) into the skin every Mon, Wed, Fri. HOLD IF HEMOGLOBIN is 10 or GREATER    DO NOT SHAKE  DO NOT DILUTE  DO NOT MIX with other drug solutions    ferrous gluconate (FERGON) 324 MG tablet Take 1 tablet (324 mg total) by mouth daily with breakfast.    insulin aspart U-100 (NOVOLOG) 100 unit/mL (3 mL) InPn pen Inject before meals:  150-200=+1, 201-250=+2, 251-300=+3; 301-350=+4, over 350=+5 units    lancets Misc 1 lancet by Misc.(Non-Drug; Combo Route) route 4 (four) times daily.    meclizine (ANTIVERT) 25 mg tablet Take 1 tablet (25 mg total) by mouth 3 (three) times daily as needed for Dizziness.    melatonin (MELATIN) 3 mg tablet Take 3 tablets (9 mg total) by mouth nightly as needed for Insomnia.    metoprolol tartrate (LOPRESSOR) 25 MG tablet Take 0.5 tablets (12.5 mg total) by mouth 2 (two) times daily.    metroNIDAZOLE (FLAGYL) 500 MG tablet Take 1 tablet (500 mg total) by mouth every 12 (twelve) hours. for 13 days    midodrine (PROAMATINE) 10 MG  "tablet Take 1 tablet (10 mg total) by mouth 2 (two) times daily with meals.    nutritional supplements Liqd Take 237 mLs by mouth 4 (four) times daily.    pantoprazole (PROTONIX) 40 MG tablet Take 1 tablet (40 mg total) by mouth 2 (two) times daily before meals.    patiromer calcium sorbitex (VELTASSA) 8.4 gram PwPk Take 2 packets (16.8 g total) by mouth once daily.    pen needle, diabetic (BD ULTRA-FINE MARIS PEN NEEDLE) 32 gauge x 5/32" Ndle 1 Device by Misc.(Non-Drug; Combo Route) route 4 (four) times daily with meals and nightly.    pregabalin (LYRICA) 75 MG capsule Take 1 capsule (75 mg total) by mouth Daily. Give after HD    sevelamer HCL (RENAGEL) 800 MG Tab Take 2 tablets (1,600 mg total) by mouth 3 (three) times daily with meals.    torsemide 40 mg Tab Take 40 mg by mouth once daily.    venlafaxine (EFFEXOR) 37.5 MG Tab Take 1 tablet (37.5 mg total) by mouth once daily.    vit C,F-Bq-ebgsw-lutein-zeaxan (PRESERVISION AREDS 2) 821-707-68-1 mg-unit-mg-mg Cap     vitamins  A,C,E-zinc-copper [phone] unit-mg-unit Cap Take 1 tablet by mouth once daily.     Family History       Problem Relation (Age of Onset)    Arthritis Father    Breast cancer Mother, Paternal Aunt    Cancer Mother, Paternal Aunt    Diabetes Paternal Grandfather    Hyperlipidemia Sister    Vision loss Father, Mother, Sister          Tobacco Use    Smoking status: Never    Smokeless tobacco: Never   Substance and Sexual Activity    Alcohol use: No    Drug use: Never    Sexual activity: Not Currently     Partners: Male     Birth control/protection: See Surgical Hx     Comment: :      Review of Systems   Reason unable to perform ROS: severe dyspnea.     Objective:     Vital Signs (Most Recent):  Temp: 97.7 °F (36.5 °C) (02/14/24 0300)  Pulse: 94 (02/14/24 0515)  Resp: (!) 35 (02/14/24 0515)  BP: 102/67 (02/14/24 0515)  SpO2: 98 % (02/14/24 0515) Vital Signs (24h Range):  Temp:  [97.4 °F (36.3 °C)-98.2 °F (36.8 °C)] 97.7 °F (36.5 " °C)  Pulse:  [] 94  Resp:  [15-47] 35  SpO2:  [93 %-100 %] 98 %  BP: ()/(51-84) 102/67     Weight: 67.6 kg (149 lb 0.5 oz)  Body mass index is 24.8 kg/m².    SpO2: 98 %         Intake/Output Summary (Last 24 hours) at 2/14/2024 0804  Last data filed at 2/14/2024 0500  Gross per 24 hour   Intake 3003.15 ml   Output 2129 ml   Net 874.15 ml       Lines/Drains/Airways       Central Venous Catheter Line  Duration             Permacath right subclavian -- days         Hemodialysis Catheter 01/25/23 1501 right internal jugular 384 days              Peripheral Intravenous Line  Duration                  Hemodialysis AV Fistula 09/01/23 0800 Left forearm 166 days         Peripheral IV - Single Lumen 02/12/24 0207 18 G Anterior;Proximal;Right Forearm 2 days         Peripheral IV - Single Lumen 02/12/24 1741 22 G;1 in Anterior;Right Wrist 1 day                     Physical Exam  Constitutional:       General: She is in acute distress.      Appearance: She is ill-appearing.   Cardiovascular:      Rate and Rhythm: Normal rate and regular rhythm.      Comments: Radial pulses not palpable. Cool extremities. 2+ femoral pulse. Pitting edema in all extremities.  Pulmonary:      Comments: Increased work of breathing  Abdominal:      Palpations: Abdomen is soft.   Neurological:      General: No focal deficit present.      Mental Status: She is alert.            Significant Labs: All pertinent lab results from the last 24 hours have been reviewed.      Assessment and Plan:     Cardiac/Vascular  New onset of congestive heart failure  Acute new onset systolic HF 15-20% with mod-severe MR  Patient will need LHC to evaluate for ischemic cardiomyopathy however at the moment she is unable to lay flat for the duration of the procedure.  Plan for elective LHC once she improves clinically        VTE Risk Mitigation (From admission, onward)           Ordered     heparin (porcine) injection 5,000 Units  Every 8 hours         02/12/24  0416     IP VTE HIGH RISK PATIENT  Once         02/12/24 0416     Place sequential compression device  Until discontinued         02/12/24 0416     heparin (porcine) injection 1,000 Units  As needed (PRN)         02/12/24 0229                    Thank you for your consult. I will sign off. Please contact us if you have any additional questions.    Darcy Ogden MD  Interventional Cardiology   [facility] - Surgical Intensive Care    I have seen the patient, reviewed the Fellow's history and physical, assessment and plan. I have personally interviewed and examined the patient and agree with the findings.     AUSTIN Stafford MD

## 2024-02-15 PROBLEM — J90 PLEURAL EFFUSION ON LEFT: Status: ACTIVE | Noted: 2024-01-15

## 2024-02-15 PROBLEM — Z51.5 PALLIATIVE CARE ENCOUNTER: Status: ACTIVE | Noted: 2019-10-17

## 2024-02-15 PROBLEM — R06.02 SHORTNESS OF BREATH: Status: ACTIVE | Noted: 2024-02-15

## 2024-02-15 PROBLEM — Z71.89 GOALS OF CARE, COUNSELING/DISCUSSION: Status: ACTIVE | Noted: 2024-02-15

## 2024-02-15 PROBLEM — Z71.89 ADVANCED CARE PLANNING/COUNSELING DISCUSSION: Status: ACTIVE | Noted: 2024-02-15

## 2024-02-15 LAB
ALBUMIN SERPL BCP-MCNC: 1.9 G/DL (ref 3.5–5.2)
ALBUMIN SERPL BCP-MCNC: 2.1 G/DL (ref 3.5–5.2)
ALBUMIN SERPL BCP-MCNC: 2.2 G/DL (ref 3.5–5.2)
ALP SERPL-CCNC: 108 U/L (ref 55–135)
ALT SERPL W/O P-5'-P-CCNC: 28 U/L (ref 10–44)
ANION GAP SERPL CALC-SCNC: 11 MMOL/L (ref 8–16)
ANION GAP SERPL CALC-SCNC: 12 MMOL/L (ref 8–16)
ANION GAP SERPL CALC-SCNC: 13 MMOL/L (ref 8–16)
APPEARANCE FLD: CLEAR
AST SERPL-CCNC: 53 U/L (ref 10–40)
BASOPHILS # BLD AUTO: 0.01 K/UL (ref 0–0.2)
BASOPHILS NFR BLD: 0.1 % (ref 0–1.9)
BILIRUB SERPL-MCNC: 0.3 MG/DL (ref 0.1–1)
BODY FLD TYPE: NORMAL
BODY FLUID SOURCE, LDH: NORMAL
BUN SERPL-MCNC: 16 MG/DL (ref 8–23)
BUN SERPL-MCNC: 6 MG/DL (ref 8–23)
BUN SERPL-MCNC: 6 MG/DL (ref 8–23)
CALCIUM SERPL-MCNC: 7.4 MG/DL (ref 8.7–10.5)
CALCIUM SERPL-MCNC: 7.6 MG/DL (ref 8.7–10.5)
CALCIUM SERPL-MCNC: 8.3 MG/DL (ref 8.7–10.5)
CHLORIDE SERPL-SCNC: 100 MMOL/L (ref 95–110)
CHLORIDE SERPL-SCNC: 101 MMOL/L (ref 95–110)
CHLORIDE SERPL-SCNC: 99 MMOL/L (ref 95–110)
CO2 SERPL-SCNC: 14 MMOL/L (ref 23–29)
CO2 SERPL-SCNC: 19 MMOL/L (ref 23–29)
CO2 SERPL-SCNC: 21 MMOL/L (ref 23–29)
COLOR FLD: YELLOW
CREAT SERPL-MCNC: 0.7 MG/DL (ref 0.5–1.4)
CREAT SERPL-MCNC: 0.7 MG/DL (ref 0.5–1.4)
CREAT SERPL-MCNC: 1.6 MG/DL (ref 0.5–1.4)
DIFFERENTIAL METHOD BLD: ABNORMAL
EOSINOPHIL # BLD AUTO: 0 K/UL (ref 0–0.5)
EOSINOPHIL NFR BLD: 0.1 % (ref 0–8)
ERYTHROCYTE [DISTWIDTH] IN BLOOD BY AUTOMATED COUNT: 22.5 % (ref 11.5–14.5)
EST. GFR  (NO RACE VARIABLE): 33.8 ML/MIN/1.73 M^2
EST. GFR  (NO RACE VARIABLE): >60 ML/MIN/1.73 M^2
EST. GFR  (NO RACE VARIABLE): >60 ML/MIN/1.73 M^2
GLUCOSE SERPL-MCNC: 124 MG/DL (ref 70–110)
GLUCOSE SERPL-MCNC: 133 MG/DL (ref 70–110)
GLUCOSE SERPL-MCNC: 200 MG/DL (ref 70–110)
GRAM STN SPEC: NORMAL
GRAM STN SPEC: NORMAL
HCT VFR BLD AUTO: 28.8 % (ref 37–48.5)
HGB BLD-MCNC: 8.9 G/DL (ref 12–16)
IMM GRANULOCYTES # BLD AUTO: 0.07 K/UL (ref 0–0.04)
IMM GRANULOCYTES NFR BLD AUTO: 0.7 % (ref 0–0.5)
KOH PREP SPEC: NORMAL
LDH FLD L TO P-CCNC: 121 U/L
LDH SERPL L TO P-CCNC: 374 U/L (ref 110–260)
LYMPHOCYTES # BLD AUTO: 0.8 K/UL (ref 1–4.8)
LYMPHOCYTES NFR BLD: 8.8 % (ref 18–48)
LYMPHOCYTES NFR FLD MANUAL: 18 %
MAGNESIUM SERPL-MCNC: 1.8 MG/DL (ref 1.6–2.6)
MAGNESIUM SERPL-MCNC: 1.8 MG/DL (ref 1.6–2.6)
MAGNESIUM SERPL-MCNC: 2.4 MG/DL (ref 1.6–2.6)
MCH RBC QN AUTO: 29.9 PG (ref 27–31)
MCHC RBC AUTO-ENTMCNC: 30.9 G/DL (ref 32–36)
MCV RBC AUTO: 97 FL (ref 82–98)
MESOTHL CELL NFR FLD MANUAL: 2 %
MONOCYTES # BLD AUTO: 0.8 K/UL (ref 0.3–1)
MONOCYTES NFR BLD: 7.9 % (ref 4–15)
MONOS+MACROS NFR FLD MANUAL: 24 %
NEUTROPHILS # BLD AUTO: 7.9 K/UL (ref 1.8–7.7)
NEUTROPHILS NFR BLD: 82.4 % (ref 38–73)
NEUTROPHILS NFR FLD MANUAL: 56 %
NRBC BLD-RTO: 0 /100 WBC
PHOSPHATE SERPL-MCNC: 2 MG/DL (ref 2.7–4.5)
PHOSPHATE SERPL-MCNC: 2.2 MG/DL (ref 2.7–4.5)
PHOSPHATE SERPL-MCNC: 5.6 MG/DL (ref 2.7–4.5)
PLATELET # BLD AUTO: 223 K/UL (ref 150–450)
PMV BLD AUTO: 11.5 FL (ref 9.2–12.9)
POCT GLUCOSE: 141 MG/DL (ref 70–110)
POCT GLUCOSE: 144 MG/DL (ref 70–110)
POCT GLUCOSE: 185 MG/DL (ref 70–110)
POTASSIUM SERPL-SCNC: 4.5 MMOL/L (ref 3.5–5.1)
POTASSIUM SERPL-SCNC: 4.5 MMOL/L (ref 3.5–5.1)
POTASSIUM SERPL-SCNC: 5.2 MMOL/L (ref 3.5–5.1)
PROT FLD-MCNC: 1.8 G/DL
PROT SERPL-MCNC: 6.1 G/DL (ref 6–8.4)
RBC # BLD AUTO: 2.98 M/UL (ref 4–5.4)
SODIUM SERPL-SCNC: 128 MMOL/L (ref 136–145)
SODIUM SERPL-SCNC: 131 MMOL/L (ref 136–145)
SODIUM SERPL-SCNC: 131 MMOL/L (ref 136–145)
SPECIMEN SOURCE: NORMAL
WBC # BLD AUTO: 9.58 K/UL (ref 3.9–12.7)
WBC # FLD: 42 /CU MM

## 2024-02-15 PROCEDURE — 63600175 PHARM REV CODE 636 W HCPCS: Performed by: STUDENT IN AN ORGANIZED HEALTH CARE EDUCATION/TRAINING PROGRAM

## 2024-02-15 PROCEDURE — 80100008 HC CRRT DAILY MAINTENANCE

## 2024-02-15 PROCEDURE — 83735 ASSAY OF MAGNESIUM: CPT | Performed by: INTERNAL MEDICINE

## 2024-02-15 PROCEDURE — 99233 SBSQ HOSP IP/OBS HIGH 50: CPT | Mod: ,,, | Performed by: INTERNAL MEDICINE

## 2024-02-15 PROCEDURE — 25000003 PHARM REV CODE 250

## 2024-02-15 PROCEDURE — 90945 DIALYSIS ONE EVALUATION: CPT

## 2024-02-15 PROCEDURE — 80069 RENAL FUNCTION PANEL: CPT | Performed by: STUDENT IN AN ORGANIZED HEALTH CARE EDUCATION/TRAINING PROGRAM

## 2024-02-15 PROCEDURE — 25000003 PHARM REV CODE 250: Performed by: STUDENT IN AN ORGANIZED HEALTH CARE EDUCATION/TRAINING PROGRAM

## 2024-02-15 PROCEDURE — 97110 THERAPEUTIC EXERCISES: CPT

## 2024-02-15 PROCEDURE — 63600175 PHARM REV CODE 636 W HCPCS

## 2024-02-15 PROCEDURE — 87070 CULTURE OTHR SPECIMN AEROBIC: CPT | Performed by: INTERNAL MEDICINE

## 2024-02-15 PROCEDURE — 97166 OT EVAL MOD COMPLEX 45 MIN: CPT

## 2024-02-15 PROCEDURE — 63600175 PHARM REV CODE 636 W HCPCS: Performed by: INTERNAL MEDICINE

## 2024-02-15 PROCEDURE — 27100171 HC OXYGEN HIGH FLOW UP TO 24 HOURS

## 2024-02-15 PROCEDURE — 84100 ASSAY OF PHOSPHORUS: CPT | Performed by: INTERNAL MEDICINE

## 2024-02-15 PROCEDURE — 25000242 PHARM REV CODE 250 ALT 637 W/ HCPCS: Performed by: NURSE PRACTITIONER

## 2024-02-15 PROCEDURE — 84157 ASSAY OF PROTEIN OTHER: CPT | Performed by: INTERNAL MEDICINE

## 2024-02-15 PROCEDURE — 94799 UNLISTED PULMONARY SVC/PX: CPT

## 2024-02-15 PROCEDURE — 99900035 HC TECH TIME PER 15 MIN (STAT)

## 2024-02-15 PROCEDURE — 25000003 PHARM REV CODE 250: Performed by: INTERNAL MEDICINE

## 2024-02-15 PROCEDURE — 27000221 HC OXYGEN, UP TO 24 HOURS

## 2024-02-15 PROCEDURE — 20000000 HC ICU ROOM

## 2024-02-15 PROCEDURE — 97530 THERAPEUTIC ACTIVITIES: CPT

## 2024-02-15 PROCEDURE — 89051 BODY FLUID CELL COUNT: CPT | Mod: 91 | Performed by: INTERNAL MEDICINE

## 2024-02-15 PROCEDURE — 87075 CULTR BACTERIA EXCEPT BLOOD: CPT | Performed by: INTERNAL MEDICINE

## 2024-02-15 PROCEDURE — 89051 BODY FLUID CELL COUNT: CPT | Performed by: INTERNAL MEDICINE

## 2024-02-15 PROCEDURE — 80069 RENAL FUNCTION PANEL: CPT | Mod: 91 | Performed by: STUDENT IN AN ORGANIZED HEALTH CARE EDUCATION/TRAINING PROGRAM

## 2024-02-15 PROCEDURE — 97161 PT EVAL LOW COMPLEX 20 MIN: CPT

## 2024-02-15 PROCEDURE — 94761 N-INVAS EAR/PLS OXIMETRY MLT: CPT

## 2024-02-15 PROCEDURE — 87210 SMEAR WET MOUNT SALINE/INK: CPT | Performed by: INTERNAL MEDICINE

## 2024-02-15 PROCEDURE — 87102 FUNGUS ISOLATION CULTURE: CPT | Performed by: INTERNAL MEDICINE

## 2024-02-15 PROCEDURE — 90945 DIALYSIS ONE EVALUATION: CPT | Mod: GC,,, | Performed by: INTERNAL MEDICINE

## 2024-02-15 PROCEDURE — 32554 ASPIRATE PLEURA W/O IMAGING: CPT

## 2024-02-15 PROCEDURE — 83615 LACTATE (LD) (LDH) ENZYME: CPT | Performed by: INTERNAL MEDICINE

## 2024-02-15 PROCEDURE — 94640 AIRWAY INHALATION TREATMENT: CPT

## 2024-02-15 PROCEDURE — 85025 COMPLETE CBC W/AUTO DIFF WBC: CPT | Performed by: INTERNAL MEDICINE

## 2024-02-15 PROCEDURE — 83735 ASSAY OF MAGNESIUM: CPT | Mod: 91 | Performed by: STUDENT IN AN ORGANIZED HEALTH CARE EDUCATION/TRAINING PROGRAM

## 2024-02-15 PROCEDURE — 87205 SMEAR GRAM STAIN: CPT | Performed by: INTERNAL MEDICINE

## 2024-02-15 PROCEDURE — 0W9B3ZZ DRAINAGE OF LEFT PLEURAL CAVITY, PERCUTANEOUS APPROACH: ICD-10-PCS | Performed by: INTERNAL MEDICINE

## 2024-02-15 PROCEDURE — 80053 COMPREHEN METABOLIC PANEL: CPT | Performed by: INTERNAL MEDICINE

## 2024-02-15 PROCEDURE — 83615 LACTATE (LD) (LDH) ENZYME: CPT | Mod: 91 | Performed by: INTERNAL MEDICINE

## 2024-02-15 RX ORDER — MAGNESIUM SULFATE HEPTAHYDRATE 40 MG/ML
2 INJECTION, SOLUTION INTRAVENOUS
Status: DISCONTINUED | OUTPATIENT
Start: 2024-02-15 | End: 2024-02-16

## 2024-02-15 RX ORDER — NOREPINEPHRINE BITARTRATE/D5W 4MG/250ML
0-.2 PLASTIC BAG, INJECTION (ML) INTRAVENOUS CONTINUOUS
Status: DISPENSED | OUTPATIENT
Start: 2024-02-15 | End: 2024-02-16

## 2024-02-15 RX ORDER — LORAZEPAM 0.5 MG/1
0.5 TABLET ORAL EVERY 8 HOURS PRN
Status: DISCONTINUED | OUTPATIENT
Start: 2024-02-15 | End: 2024-02-18

## 2024-02-15 RX ORDER — AMIODARONE HYDROCHLORIDE 200 MG/1
400 TABLET ORAL 2 TIMES DAILY
Status: DISPENSED | OUTPATIENT
Start: 2024-02-15 | End: 2024-02-25

## 2024-02-15 RX ORDER — HYDROCODONE BITARTRATE AND ACETAMINOPHEN 500; 5 MG/1; MG/1
TABLET ORAL CONTINUOUS
Status: DISCONTINUED | OUTPATIENT
Start: 2024-02-15 | End: 2024-02-16

## 2024-02-15 RX ORDER — AMIODARONE HYDROCHLORIDE 200 MG/1
200 TABLET ORAL DAILY
Status: DISCONTINUED | OUTPATIENT
Start: 2024-02-25 | End: 2024-03-01 | Stop reason: HOSPADM

## 2024-02-15 RX ORDER — INSULIN ASPART 100 [IU]/ML
0-10 INJECTION, SOLUTION INTRAVENOUS; SUBCUTANEOUS
Status: DISCONTINUED | OUTPATIENT
Start: 2024-02-15 | End: 2024-03-01 | Stop reason: HOSPADM

## 2024-02-15 RX ORDER — TORSEMIDE 20 MG/1
20 TABLET ORAL 2 TIMES DAILY
COMMUNITY
Start: 2024-02-05 | End: 2024-04-26 | Stop reason: SDUPTHER

## 2024-02-15 RX ORDER — PREGABALIN 75 MG/1
75 CAPSULE ORAL EVERY OTHER DAY
Status: DISCONTINUED | OUTPATIENT
Start: 2024-02-16 | End: 2024-03-01 | Stop reason: HOSPADM

## 2024-02-15 RX ADMIN — PANTOPRAZOLE SODIUM 40 MG: 40 TABLET, DELAYED RELEASE ORAL at 06:02

## 2024-02-15 RX ADMIN — HEPARIN SODIUM 5000 UNITS: 5000 INJECTION INTRAVENOUS; SUBCUTANEOUS at 01:02

## 2024-02-15 RX ADMIN — ACETAMINOPHEN 650 MG: 325 TABLET ORAL at 05:02

## 2024-02-15 RX ADMIN — AMIODARONE HYDROCHLORIDE 400 MG: 200 TABLET ORAL at 09:02

## 2024-02-15 RX ADMIN — HEPARIN SODIUM 5000 UNITS: 5000 INJECTION INTRAVENOUS; SUBCUTANEOUS at 06:02

## 2024-02-15 RX ADMIN — VANCOMYCIN HYDROCHLORIDE 500 MG: 500 INJECTION, POWDER, LYOPHILIZED, FOR SOLUTION INTRAVENOUS at 10:02

## 2024-02-15 RX ADMIN — PIPERACILLIN SODIUM AND TAZOBACTAM SODIUM 4.5 G: 4; .5 INJECTION, POWDER, FOR SOLUTION INTRAVENOUS at 01:02

## 2024-02-15 RX ADMIN — PIPERACILLIN SODIUM AND TAZOBACTAM SODIUM 4.5 G: 4; .5 INJECTION, POWDER, FOR SOLUTION INTRAVENOUS at 08:02

## 2024-02-15 RX ADMIN — DRONABINOL 5 MG: 2.5 CAPSULE ORAL at 06:02

## 2024-02-15 RX ADMIN — SODIUM CHLORIDE: 9 INJECTION, SOLUTION INTRAVENOUS at 03:02

## 2024-02-15 RX ADMIN — VANCOMYCIN HYDROCHLORIDE 500 MG: 500 INJECTION, POWDER, LYOPHILIZED, FOR SOLUTION INTRAVENOUS at 03:02

## 2024-02-15 RX ADMIN — SODIUM CHLORIDE: 9 INJECTION, SOLUTION INTRAVENOUS at 11:02

## 2024-02-15 RX ADMIN — ATORVASTATIN CALCIUM 40 MG: 40 TABLET, FILM COATED ORAL at 09:02

## 2024-02-15 RX ADMIN — Medication 324 MG: at 08:02

## 2024-02-15 RX ADMIN — AMIODARONE HYDROCHLORIDE 400 MG: 200 TABLET ORAL at 12:02

## 2024-02-15 RX ADMIN — IPRATROPIUM BROMIDE AND ALBUTEROL SULFATE 3 ML: .5; 3 SOLUTION RESPIRATORY (INHALATION) at 03:02

## 2024-02-15 RX ADMIN — NOREPINEPHRINE BITARTRATE 0.02 MCG/KG/MIN: 4 INJECTION, SOLUTION INTRAVENOUS at 09:02

## 2024-02-15 RX ADMIN — VENLAFAXINE 37.5 MG: 37.5 TABLET ORAL at 08:02

## 2024-02-15 RX ADMIN — HEPARIN SODIUM 5000 UNITS: 5000 INJECTION INTRAVENOUS; SUBCUTANEOUS at 09:02

## 2024-02-15 RX ADMIN — LORAZEPAM 0.5 MG: 0.5 TABLET ORAL at 09:02

## 2024-02-15 RX ADMIN — PANTOPRAZOLE SODIUM 40 MG: 40 TABLET, DELAYED RELEASE ORAL at 05:02

## 2024-02-15 RX ADMIN — MUPIROCIN: 20 OINTMENT TOPICAL at 08:02

## 2024-02-15 RX ADMIN — MAGNESIUM SULFATE HEPTAHYDRATE 2 G: 40 INJECTION, SOLUTION INTRAVENOUS at 11:02

## 2024-02-15 RX ADMIN — DRONABINOL 5 MG: 2.5 CAPSULE ORAL at 05:02

## 2024-02-15 RX ADMIN — SODIUM CHLORIDE: 9 INJECTION, SOLUTION INTRAVENOUS at 08:02

## 2024-02-15 RX ADMIN — PIPERACILLIN SODIUM AND TAZOBACTAM SODIUM 4.5 G: 4; .5 INJECTION, POWDER, FOR SOLUTION INTRAVENOUS at 05:02

## 2024-02-15 NOTE — ASSESSMENT & PLAN NOTE
Goal hemoglobin in ESRD is 10-12  Hemoglobin   Date Value Ref Range Status   02/15/2024 8.9 (L) 12.0 - 16.0 g/dL Final     Iron   Date Value Ref Range Status   02/02/2024 52 30 - 160 ug/dL Final     Transferrin   Date Value Ref Range Status   02/02/2024 121 (L) 200 - 375 mg/dL Final     TIBC   Date Value Ref Range Status   02/02/2024 179 (L) 250 - 450 ug/dL Final     Saturated Iron   Date Value Ref Range Status   02/02/2024 29 20 - 50 % Final     Ferritin   Date Value Ref Range Status   02/02/2024 1,803 (H) 20.0 - 300.0 ng/mL Final

## 2024-02-15 NOTE — PROCEDURES
"Lily Green is a 73 y.o. female patient.    Temp: 98.6 °F (37 °C) (02/15/24 1200)  Pulse: 91 (02/15/24 1315)  Resp: (!) 30 (02/15/24 1315)  BP: 98/64 (02/15/24 1315)  SpO2: 99 % (02/15/24 1315)  Weight: 67.6 kg (149 lb) (24 124)  Height: 5' 5" (165.1 cm) (24 124)       Thoracentesis    Date/Time: 2/15/2024 3:10 PM  Location procedure was performed: [facility] SURGICAL ICU (SICU)    Performed by: Meghan Trujillo MD  Authorized by: Meghan Trujillo MD  Consent Done: Yes  Consent: Verbal consent obtained. Written consent obtained.  Risks and benefits: risks, benefits and alternatives were discussed  Consent given by: patient  Patient identity confirmed: , MRN and name  Time out: Immediately prior to procedure a "time out" was called to verify the correct patient, procedure, equipment, support staff and site/side marked as required.  Procedure purpose: diagnostic and therapeutic  Indications: pleural effusion  Preparation: Patient was prepped and draped in the usual sterile fashion.  Local anesthesia used: yes  Anesthesia: local infiltration    Anesthesia:  Local anesthesia used: yes  Local Anesthetic: lidocaine 1% without epinephrine  Anesthetic total: 7 mL    Patient sedated: no  Preparation: skin prepped with ChloraPrep  Patient position: supine  Ultrasound guidance: yes  Location: left lateral  Intercostal space: 5th  Puncture method: over-the-needle catheter  Needle size: 18  Catheter size: 8 Kyr[place]z  Number of attempts: 1  Drainage amount: 1450 ml  Drainage characteristics: clear  Patient tolerance: Patient tolerated the procedure well with no immediate complications  Chest x-ray performed: yes  Chest x-ray interpreted by me.  Chest x-ray findings: pleural effusion  Complications: No  Estimated blood loss (mL): <5.  Drainage Tube: removed          Meghan Trujillo M.D.  Rapid Response/Critical Care  Department of Pulmonary Medicine  [facility]        N.B.: Portions of " this note was dictated using M*Modal Fluency Direct--there may be voice recognition errors occasionally missed on review.

## 2024-02-15 NOTE — PT/OT/SLP EVAL
Occupational Therapy   Co-Evaluation    Name: Lily Green  MRN: [id number]  Admitting Diagnosis: Acute hypoxemic respiratory failure  Recent Surgery: * No surgery found *      Recommendations:     Discharge Recommendations:    Discharge Equipment Recommendations:   (TBD at next level of care)  Barriers to discharge:   (current level of skilled assistance needed)    Assessment:     Lily Green is a 73 y.o. female with a medical diagnosis of Acute hypoxemic respiratory failure. Pt admitted from SNF with dyspnea, increased WOB and hypoxia. She presents on CRRT without pressor support. RN ok'd session; however pt limited to bed level evaluation and ex's 2* increased RR with minimal activity. Performance deficits affecting function: weakness, impaired endurance, impaired self care skills, impaired functional mobility, gait instability, impaired balance, impaired cardiopulmonary response to activity, edema, impaired skin, decreased lower extremity function, decreased upper extremity function, impaired fine motor. Pt benefits from co-treatment with PT to accommodate pt's activity tolerance and progression with therapy.       Rehab Prognosis: Fair; patient would benefit from acute skilled OT services to address these deficits and reach maximum level of function.       Plan:     Patient to be seen 3 x/week to address the above listed problems via self-care/home management, therapeutic activities, therapeutic exercises, neuromuscular re-education  Plan of Care Expires: 02/29/24  Plan of Care Reviewed with: patient, spouse    Subjective     Chief Complaint: weakness and SOB  Patient/Family Comments/goals: to get better and go home    Occupational Profile:  Living Environment: lives with spouse in [facility]  with 1 OSMIN; [facility] with 3 in 1 commode and grab bar; pt has 2nd commode frame over toilet   Previous level of function: (I) - Mod (I) prior to previous admit. Pt d/c'd to [facility] from  -.  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  Patient found supine with pulse ox (continuous), telemetry, Trialysis, oxygen upon OT entry to room.    General Precautions: Standard, fall  Orthopedic Precautions:    Braces:    Respiratory Status: Nasal cannula, flow 2 L/min    Occupational Performance:    Bed Mobility:    Unable to tolerate rolling 2* increased WOB/RR    Functional Mobility/Transfers:  NT    Activities of Daily Living:  Total A required for all BADL    Cognitive/Visual Perceptual:  Pt lethargic, but able to follow commands    Physical Exam:  B UE edema with weeping in R UE > L UE  B UE overall MMT 2/5    AMPAC 6 Click ADL:  AMPAC Total Score: 6    Treatment & Education:  Pt ed on OT POC  Daily orientation provided  Pt/spouse ed on AAROM  to pt's tolerance  Pt repositioned for optimal breathing/lung expansion within pt's tolerance  Pt/spouse ed on positioning for optimal skin integrity    Patient left supine with all lines intact, call button in reach, RN notified, and spouse present    GOALS:   Multidisciplinary Problems       Occupational Therapy Goals       Not on file                    History:     Past Medical History:   Diagnosis Date    Anxiety     Celiac disease 2018    Celiac disease     Depression     Diabetes mellitus     Family history of breast cancer in mother      at age 68    Hyperlipidemia     Hypertension     Meniere disease     Meniere's disease, unspecified ear      Menopause     Peptic ulcer     Reflux esophagitis     Urinary tract infection     Vaginal infection     Vaginal Pap smear 04/07/2014    Pap/Hpv Negative          Past Surgical History:   Procedure Laterality Date    APPENDECTOMY      BREAST SURGERY      Tags    CARPAL TUNNEL RELEASE Bilateral 09/2017    ,     CLOSURE, COLOSTOMY Left 1/16/2023    Procedure: CLOSURE, COLOSTOMY;  Surgeon: Manuel Javier MD;  Location: [facility] OR;  Service: General;  Laterality: Left;    COLONOSCOPY  04/2018    Normal  ( Juan A)     COLOSTOMY Right 1/16/2023    Procedure: CREATION, COLOSTOMY;  Surgeon: Manuel Javier MD;  Location: [facility] OR;  Service: General;  Laterality: Right;    DILATION AND CURETTAGE OF UTERUS  1986    DUPUYTREN CONTRACTURE RELEASE Bilateral 09/2017    ESOPHAGOGASTRODUODENOSCOPY N/A 1/24/2024    Procedure: EGD (ESOPHAGOGASTRODUODENOSCOPY);  Surgeon: Yamilet Walters MD;  Location: [facility] ([address] [place] [address]);  Service: Gastroenterology;  Laterality: N/A;    ESOPHAGOGASTRODUODENOSCOPY N/A 1/26/2024    Procedure: EGD (ESOPHAGOGASTRODUODENOSCOPY);  Surgeon: Yamilet Walters MD;  Location: [facility] ([address] [place] [address]);  Service: Gastroenterology;  Laterality: N/A;    HYSTERECTOMY  1987    BEAU w/ appy, no BSO     IMPLANTATION OF LEADLESS PACEMAKER N/A 1/16/2024    Procedure: INSERTION, CARDIAC PACEMAKER, LEADLESS;  Surgeon: LENIN Frances MD;  Location: [facility] EP LAB;  Service: Cardiology;  Laterality: N/A;  SB, LEADLESS PPM (MICRA), MDT, ANES, EH, CVICU [id number]    INJECTION OF NEUROLYTIC AGENT AROUND LUMBAR SYMPATHETIC NERVE N/A 1/6/2022    Procedure: BLOCK, LUMBAR SYMPATHETIC;  Surgeon: Souleymane Rizo Jr., MD;  Location: [facility] PAIN MGT;  Service: Pain Management;  Laterality: N/A;  no pacemaker   pt is diabetic     INJECTION OF NEUROLYTIC AGENT AROUND LUMBAR SYMPATHETIC NERVE N/A 8/25/2022    Procedure: BLOCK, LUMBAR SYMPATHETIC;  Surgeon: Souleymane Rizo Jr., MD;  Location: [facility] PAIN MGT;  Service: Pain  Management;  Laterality: N/A;  diabetic     INSERTION OF TUNNELED CENTRAL VENOUS HEMODIALYSIS CATHETER Right 1/25/2023    Procedure: INSERTION, CATHETER, HEMODIALYSIS, DUAL LUMEN;  Surgeon: Manuel Javier MD;  Location: [facility] OR;  Service: General;  Laterality: Right;    INSERTION, CATHETER, TUNNELED Left 1/28/2023    Procedure: Insertion,catheter,tunneled;  Surgeon: Watson Fontenot MD;  Location: [facility] OR;  Service: General;  Laterality: Left;    LAPAROTOMY, EXPLORATORY N/A 1/14/2023    Procedure: LAPAROTOMY, EXPLORATORY;  Surgeon: Manuel Javier MD;  Location: [facility] OR;  Service: General;  Laterality: N/A;    LAPAROTOMY, EXPLORATORY N/A 1/16/2023    Procedure: LAPAROTOMY, EXPLORATORY;  Surgeon: Manuel Javier MD;  Location: [facility] OR;  Service: General;  Laterality: N/A;    LYMPHADENECTOMY Right 1/2/2024    Procedure: LYMPHADENECTOMY;  Surgeon: Tan Thomson MD;  Location: [facility] OR 2ND FLR;  Service: Cardiothoracic;  Laterality: Right;    PACEMAKER, TEMPORARY, TRANSVENOUS, PEDIATRIC Right 1/12/2024    Procedure: Pacemaker, Temporary, Transvenous;  Surgeon: Todd Carlisle MD;  Location: [facility] CATH LAB;  Service: Cardiology;  Laterality: Right;    REMOVAL OF CATHETER Right 1/28/2023    Procedure: REMOVAL, CATHETER;  Surgeon: Watson Fontenot MD;  Location: [facility] OR;  Service: General;  Laterality: Right;    REMOVAL, TUNNELED CATH Right 1/25/2023    Procedure: REMOVAL, TUNNELED CATH;  Surgeon: Manuel Javier MD;  Location: [facility] OR;  Service: General;  Laterality: Right;    ROBOTIC BRONCHOSCOPY N/A 10/20/2023    Procedure: ROBOTIC BRONCHOSCOPY;  Surgeon: Mayda Monzon MD;  Location: [facility] OR 2ND FLR;  Service: Pulmonary;  Laterality: N/A;    SHOULDER SURGERY  2009 & 2010    right rotator cuff    THORACOTOMY Right 1/2/2024    Procedure: THORACOTOMY;  Surgeon: Tan Thomson MD;  Location: [facility] OR 2ND FLR;  Service: Cardiothoracic;  Laterality: Right;    THORACOTOMY, WITH INITIAL THERAPEUTIC  WEDGE RESECTION OF LUNG Right 1/2/2024    Procedure: THORACOTOMY, WITH INITIAL THERAPEUTIC WEDGE RESECTION OF LUNG;  Surgeon: Tan Thomson MD;  Location: [facility] OR [address] [place] [address];  Service: Cardiothoracic;  Laterality: Right;    TONSILLECTOMY      UPPER GASTROINTESTINAL ENDOSCOPY  04/2018       Time Tracking:     OT Date of Treatment: 02/15/24  OT Start Time: 0857  OT Stop Time: 0918  OT Total Time (min): 21 min    Billable Minutes:Evaluation 10  Therapeutic Activity 10    2/15/2024

## 2024-02-15 NOTE — ASSESSMENT & PLAN NOTE
I have reviewed hospital notes from  MICU service and other specialty providers. I have also reviewed CBC, CMP/BMP,  cultures and imaging with my interpretation as documented.     On admission. CXR today with large left pleural effusion. Now on 2 LNC.  Continue Zosyn and vancomycin.  Monitor vancomycin trough.  Discussed management plan with the staff and/or members from MICU service.

## 2024-02-15 NOTE — SUBJECTIVE & OBJECTIVE
Interval History: ". 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  She was on meropenem, then transitioned to ceftriaxone and metronidazole.  She was ultimately discharged on cefpodoxime and metronidazole with a plan to complete 4 weeks of antibiotics on February 15.  She is now re-admitted with respiratory distress.  ID consulted to assist with her care.    Small amount of fluid and gas with associated inflammatory changes in the azygoesophageal recess.  Per discussions with CT surgery and IR, no invasive procedures can be performed for this.  CXR on 2/15 with large left pleural effusion. Receiving CRRT and pending thoracentesis today.    Review of Systems   Unable to perform ROS: Severe respiratory distress     Objective:     Vital Signs (Most Recent):  Temp: 98.4 °F (36.9 °C) (02/15/24 0700)  Pulse: 63 (02/15/24 0915)  Resp: (!) 31 (02/15/24 0915)  BP: 120/74 (02/15/24 0915)  SpO2: 95 % (02/15/24 0915) Vital Signs (24h Range):  Temp:  [96.6 °F (35.9 °C)-98.5 °F (36.9 °C)] 98.4 °F (36.9 °C)  Pulse:  [61-93] 63  Resp:  [13-75] 31  SpO2:  [93 %-100 %] 95 %  BP: ()/(51-74) 120/74     Weight: 67.6 kg (149 lb)  Body mass index is 24.79 kg/m².    Estimated Creatinine Clearance: 28.2 mL/min (A) (based on SCr of 1.6 mg/dL (H)).     Physical Exam  Vitals and nursing note reviewed.   Constitutional:       Appearance: She is well-developed.   HENT:      Head: Normocephalic and atraumatic.      Right Ear: External ear normal.      Left Ear: External ear normal.   Eyes:      General: No scleral icterus.        Right eye: No discharge.         Left eye: No discharge.      Conjunctiva/sclera: Conjunctivae normal.   Cardiovascular:      Rate and Rhythm: Normal rate and regular rhythm.      Heart sounds: Normal heart sounds. No murmur heard.     No  friction rub. No gallop.   Pulmonary:      Effort: Pulmonary effort is normal. Tachypnea present. No respiratory distress.      Breath sounds: No stridor. Examination of the left-middle field reveals decreased breath sounds. Examination of the left-lower field reveals decreased breath sounds. Decreased breath sounds present. No wheezing or rales.   Abdominal:      General: Bowel sounds are normal.      Palpations: Abdomen is soft.   Musculoskeletal:         General: No tenderness.      Right lower leg: No edema.      Left lower leg: No edema.   Skin:     General: Skin is warm and dry.   Neurological:      Mental Status: She is lethargic.          Significant Labs: BMP:   Recent Labs   Lab 02/15/24  0238   *  200*  200*   *  128*  128*   K 5.2*  5.2*  5.2*     101  101   CO2 14*  14*  14*   BUN 16  16  16   CREATININE 1.6*  1.6*  1.6*   CALCIUM 8.3*  8.3*  8.3*   MG 2.4  2.4  2.4     CBC:   Recent Labs   Lab 02/14/24  0305 02/15/24  0238   WBC 9.83 9.58   HGB 8.4* 8.9*   HCT 27.2* 28.8*    223     Microbiology Results (last 7 days)       Procedure Component Value Units Date/Time    Blood culture [[id number]] Collected: 02/12/24 0526    Order Status: Completed Specimen: Blood from Peripheral, Upper Arm, Right Updated: 02/15/24 0812     Blood Culture, Routine No Growth to date      No Growth to date      No Growth to date      No Growth to date    Blood culture [[id number]] Collected: 02/12/24 0526    Order Status: Completed Specimen: Blood from Peripheral, Antecubital, Right Updated: 02/15/24 0812     Blood Culture, Routine No Growth to date      No Growth to date      No Growth to date      No Growth to date            Significant Imaging: I have reviewed all pertinent imaging results/findings within the past 24 hours.

## 2024-02-15 NOTE — ASSESSMENT & PLAN NOTE
Has anemia of ESRD. Goal Hemoglobin >10   Patient requests all Lab, Cardiology, and Radiology Results on their Discharge Instructions

## 2024-02-15 NOTE — PLAN OF CARE
Problem: Adult Inpatient Plan of Care  Goal: Plan of Care Review  Outcome: Ongoing, Progressing     Problem: Device-Related Complication Risk (CRRT (Continuous Renal Replacement Therapy))  Goal: Safe, Effective Therapy Delivery  Outcome: Ongoing, Progressing     Problem: Diabetes Comorbidity  Goal: Blood Glucose Level Within Targeted Range  Outcome: Ongoing, Progressing     Problem: Infection (Pneumonia)  Goal: Resolution of Infection Signs and Symptoms  Outcome: Ongoing, Progressing     Problem: Impaired Wound Healing  Goal: Optimal Wound Healing  Outcome: Ongoing, Progressing     Problem: Skin Injury Risk Increased  Goal: Skin Health and Integrity  Outcome: Ongoing, Progressing     Problem: Coping Ineffective  Goal: Effective Coping  Outcome: Ongoing, Progressing

## 2024-02-15 NOTE — PLAN OF CARE
Advance Care Planning   [facility] - Surgical Intensive Care  Palliative Care   Psychosocial Assessment    Patient Name: Lily Green  MRN: [id number]  Admission Date: 2024  Hospital Length of Stay: 3 days  Code Status: Full Code   Attending Provider: Meghan Trujillo MD  Palliative Care Provider: EMORY Viera   Primary Care Physician: Pavel Calixto MD  Principal Problem:Acute hypoxemic respiratory failure    Reason for Referral: assistance with clarification of goals of care    Present during Interview:  spouse Deric and sister Sweetie  .      Primary Language:English   Needed: no      Past Medical Situation:   PMH:   Past Medical History:   Diagnosis Date    Anxiety     Celiac disease 2018    Celiac disease     Depression     Diabetes mellitus     Family history of breast cancer in mother      at age 68    Hyperlipidemia     Hypertension     Meniere disease     Meniere's disease, unspecified ear     Menopause     Peptic ulcer     Reflux esophagitis     Urinary tract infection     Vaginal infection     Vaginal Pap smear 2014    Pap/Hpv Negative      Mental Health/Substance Use History: none identified   Risk of Abuse, neglect or exploitation: none identified   Current or Previous Trauma and/or evidence of PTSD: none identified   Non-traditional Health practices: none identified     Understanding of diagnosis and prognosis: Deric has good understanding of diagnosis, will benefit from further discussion of prognosis  Experience/Comfort level with health care system: patient has increased utilization of health care system since 2022    Patients Mental Status: patient alert and times, appears oriented to self and place     Socio-Economic Factors/Resources:  Address: [address] [place] [address] [address] [place] [address]  Phone Number: [phone] (home)     Marital Status:   Household composition: patient was living with spouse  Children: no children      Family Structure, Dynamics/Relationships: Deric stated he and patient have had relationship difficulties in the past    Activities of Daily Living: patient requires assistance with activities of daily living  Support Systems-Family & Community (Home Health, HME etc): patient came from [facility]     Transportation:  yes    Work/Education History: 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   Self-Care Activities/Hobbies: walking the dog, television     History: no    Financial Resources:Medicare      Advanced Care Planning & Legal Concerns:   Advanced Directives/Living Will: no  LaPOST/POLST: no   Planning:  no    Emergency Contacts: Deric [phone]    Spirituality, Culture & Coping Mechanisms:  F- Deepika and Belief: Judaism     I - Importance: yes    C - Community/Culture Values: yes     A - Address in Care: yes      Goals/Hopes/Expectations: Deric is hoping patient's condition will improve to he can have a goals of care conversation with her  Fears/Anxiety/Concerns: Deric expressed concern that patient's condition has worsened over the last 24 hours        Preferences about EOL Environment: (own bed, family nearby, pets, music, etc) tbd      Complicated Bereavement Risk Assessment Tool (CBRAT)  Reference:  [facility] Palliative [facility] Clinical Practice Group (May 2016). Bereavement Risk Screening and Management Guidelines.  Retrieved from: http://www.grpcc.com.au/wp-content/uploads//JDWVE-Cscsrijncrn-Rwlajsmno-and-Management-Guideline-2016.pdf      Bereaved Client Characteristics   Under 18      no  Was a Twin   no  Young Spouse   no  Elderly Spouse    yes  Isolated    no  Lacks Meaningful Social Support   no  Dissatisfied with help available during illness   no  New to Financial [place] no  New to Decision-Making   no    Illness  Inherited Disorder   no  Stigmatized Disease in the family/community   no  Lengthy/Burdensome    no     Bereaved Client's History of Loss   Cumulative Multiple Losses   no  Previous Mental Health Illnesses   no  Current Mental Health Illness   no  Other Significant Health Issues   yes   Migrant/Refugee   no Will the Death be:  Sudden or Unexpected   no  Traumatic Circumstances Associated with Death   no  Significant Cultural/Social Burdens as a result of Death   no   Relationship with   Profound Lifelong Partner   yes  Highly Dependent    no  Antagonistic   no  Ambivalent   no  Deeply Connected   yes  Culturally Defined   yes   Risk Factors Scores  0-2  Low  3-5  Moderate  5+  High  All persons scoring moderate to high presume to be at risk**    (** It is acknowledged that protective factors and resilience may outweigh apparent risk factors.      Total Risk Factors Score:   moderate          Advance Care Planning     Date: 02/15/2024    [facility]  Palliative APRN and this  met patient and  Deric in hospital room. Patient awake intermittently. Deric is able to provide good history of patient condition. Asked Deric how to care for patient if her heart were to stop beating or unable to breath on her own. Deric stated patient would not want super extraordinary measures. Deric did not provide specific details. Deric stated he has awareness of CPR, intubation, and mechanical ventilation. Deric stated wanting to talk with patient's sister Sweetie. Visited later when Sweetie was present. Talked about what measures to take in the event patient condition worsens. Sweetie stated being hesitant to make a decision. Deric and Sweetie stated they will speak with additional family members about goals.  will follow for support.     Pavel Alves, [facility]  Palliative Medicine

## 2024-02-15 NOTE — SUBJECTIVE & OBJECTIVE
Interval History/Significant Events:  Still uncomfortable this morning.  Weaning down oxygen steadily.  Currently on CRRT.  Plan for thoracentesis this afternoon after CRRT.  Transitioning to oral amiodarone.    Review of Systems   Respiratory:  Positive for shortness of breath.    Cardiovascular:  Negative for chest pain.   All other systems reviewed and are negative.    Objective:     Vital Signs (Most Recent):  Temp: 98.4 °F (36.9 °C) (02/15/24 0700)  Pulse: 81 (02/15/24 0800)  Resp: 20 (02/15/24 0800)  BP: 94/63 (02/15/24 0800)  SpO2: 96 % (02/15/24 0800) Vital Signs (24h Range):  Temp:  [96.6 °F (35.9 °C)-98.5 °F (36.9 °C)] 98.4 °F (36.9 °C)  Pulse:  [61-95] 81  Resp:  [13-75] 20  SpO2:  [93 %-100 %] 96 %  BP: ()/(51-73) 94/63   Weight: 67.6 kg (149 lb)  Body mass index is 24.79 kg/m².      Intake/Output Summary (Last 24 hours) at 2/15/2024 0815  Last data filed at 2/15/2024 0800  Gross per 24 hour   Intake 2482.29 ml   Output 2644 ml   Net -161.71 ml            Physical Exam  Vitals and nursing note reviewed.   Constitutional:       General: She is not in acute distress.     Appearance: She is normal weight. She is not ill-appearing, toxic-appearing or diaphoretic.   HENT:      Head: Normocephalic and atraumatic.      Right Ear: External ear normal.      Left Ear: External ear normal.      Nose: Nose normal.   Cardiovascular:      Rate and Rhythm: Tachycardia present. Rhythm irregular.      Pulses: Normal pulses.      Heart sounds: Normal heart sounds. No murmur heard.     No friction rub. No gallop.   Pulmonary:      Comments: Moderately increased respiratory effort with bilateral crackles  Abdominal:      General: Abdomen is flat. Bowel sounds are normal. There is no distension.      Palpations: Abdomen is soft.      Tenderness: There is no abdominal tenderness. There is no guarding or rebound.   Musculoskeletal:         General: Swelling present. No tenderness. Normal range of motion.   Skin:      General: Skin is warm and dry.      Coloration: Skin is not jaundiced or pale.   Neurological:      General: No focal deficit present.      Mental Status: She is alert and oriented to person, place, and time. Mental status is at baseline.   Psychiatric:         Mood and Affect: Mood normal.         Behavior: Behavior normal.         Thought Content: Thought content normal.         Judgment: Judgment normal.            Vents:  Oxygen Concentration (%): 32 (02/13/24 0019)  Lines/Drains/Airways       Central Venous Catheter Line  Duration             Permacath right subclavian -- days         Hemodialysis Catheter 01/25/23 1501 right internal jugular 385 days              Peripheral Intravenous Line  Duration                  Hemodialysis AV Fistula 09/01/23 0800 Left forearm 167 days         Peripheral IV - Single Lumen 02/12/24 0207 18 G Anterior;Proximal;Right Forearm 3 days         Peripheral IV - Single Lumen 02/12/24 1741 22 G;1 in Anterior;Right Wrist 2 days                  Significant Labs:    CBC/Anemia Profile:  Recent Labs   Lab 02/14/24  0305 02/15/24  0238   WBC 9.83 9.58   HGB 8.4* 8.9*   HCT 27.2* 28.8*    223   MCV 97 97   RDW 22.5* 22.5*          Chemistries:  Recent Labs   Lab 02/14/24  0305 02/14/24  2221 02/15/24  0238   *  129* 127*  127* 128*  128*  128*   K 4.2  4.2 4.8  4.8 5.2*  5.2*  5.2*   CL 97  97 99  99 101  101  101   CO2 22*  22* 17*  17* 14*  14*  14*   BUN 5*  5* 12  12 16  16  16   CREATININE 0.8  0.8 1.3  1.3 1.6*  1.6*  1.6*   CALCIUM 7.4*  7.4* 8.5*  8.5* 8.3*  8.3*  8.3*   ALBUMIN 2.0*  2.0* 2.1*  2.1* 2.2*  2.2*  2.2*   PROT 5.7*  --  6.1   BILITOT 0.3  --  0.3   ALKPHOS 88  --  108   ALT 9*  --  28   AST 18  --  53*   MG 1.9  1.9 2.4  2.4 2.4  2.4  2.4   PHOS 3.0  3.0 5.2*  5.2* 5.6*  5.6*  5.6*         All pertinent labs within the past 24 hours have been reviewed.    Significant Imaging:  I have reviewed all pertinent  imaging results/findings within the past 24 hours.

## 2024-02-15 NOTE — PLAN OF CARE
"      SICU PLAN OF CARE NOTE    Dx: Acute hypoxemic respiratory failure    Shift Events: Pt clotted of CRRT at night, restarted at 04 AM, and switched from SCUF to SLED at 06AM for 8 hours. Pt remained anxious and complained of SOB, Albuterol PRN given with moderate effect.     Goals of Care: Continue CRRT, and monitoring oxygen and fluid status.    Neuro: Arouses to Voice and Follows Commands, Anxious, emotional support provided.     Vital Signs: BP (!) 86/60   Pulse 69   Temp 98.4 °F (36.9 °C) (Axillary)   Resp 15   Ht 5' 5" (1.651 m)   Wt 67.6 kg (149 lb)   LMP  (LMP Unknown) Comment: BEAU/ NO BSO  1986  SpO2 100%   Breastfeeding No   BMI 24.79 kg/m²     Cardiac: SR, MAP > 65.    Respiratory: Nasal Cannula    Diet: NPO    Gtts: Amiodarone    Urine Output: Anuric, on CRRT.    Drains: n/a     Labs/Accuchecks: q4h, renal q8h.    Skin: No new skin issue, turned q2h.      "

## 2024-02-15 NOTE — ASSESSMENT & PLAN NOTE
Seen on CXR. Unclear if infected or uninfected at this time. Pending thoracentesis today. Fluid collection near esophagus determined to not be due to esophageal leak by CTS.   Antibiotics as above.  Ordered following pleural studies: cell count, gram stain, KOH, aerobic plus anaerobic culture, fungal culture.  Other studies per MICU.

## 2024-02-15 NOTE — PROGRESS NOTES
[facility] - Surgical Intensive Care  Infectious Disease  Progress Note    Patient Name: Lily Green  MRN: [id number]  Admission Date: 2/12/2024  Length of Stay: 3 days  Attending Physician: Meghan Trujillo MD  Primary Care Provider: Pavel Calixto MD    Isolation Status: No active isolations  Assessment/Plan:      Pulmonary  * Acute hypoxemic respiratory failure  I have reviewed hospital notes from  MICU service and other specialty providers. I have also reviewed CBC, CMP/BMP,  cultures and imaging with my interpretation as documented.     On admission. CXR today with large left pleural effusion. Now on 2 LNC.  Continue Zosyn and vancomycin.  Monitor vancomycin trough.  Discussed management plan with the staff and/or members from MICU service.      Pleural effusion on left  Seen on CXR. Unclear if infected or uninfected at this time. Pending thoracentesis today. Fluid collection near esophagus determined to not be due to esophageal leak by CTS.   Antibiotics as above.  Ordered following pleural studies: cell count, gram stain, KOH, aerobic plus anaerobic culture, fungal culture.  Other studies per MICU.        Anticipated Disposition: per primary    Thank you for your consult. I will follow-up with patient. Please contact us if you have any additional questions.    Rosio Rivera MD  Infectious Disease  [facility] - Surgical Intensive Care    50 minutes of total time spent on the encounter, which includes face to face time and non-face to face time preparing to see the patient (eg, review of tests), obtaining and/or reviewing separately obtained history, documenting clinical information in the electronic or other health record, independently interpreting results (not separately reported) and communicating results to the patient/family/caregiver, or care coordination (not separately reported).     Subjective:     Principal Problem:Acute hypoxemic respiratory failure    HPI: 73 year old female with a  history of DM2, hyperlipidemia, and right middle lobe carcinoid tumor s/p open thoracotomy with wedge resection to right middle lobe and lymph node resection on January 2, 2024.  Her post surgical course was complicated by bilateral pleural effusions and respiratory cultures that were positive for  Klebsiella pneumoniae and E coli.  She was on meropenem initially but was later transitioned to ceftriaxone and metronidazole.  She also underwent leadless pacemaker placement on January 16, 2024 during that hospital stay.  After around 35 days in the hospital, she was discharged from the hospital on cefpodoxime and metronidazole around February 6.  She is re-admitted with acute shortness of breath.  Repeat chest imaging shows trace bilateral pleural effusions.  CT also shows some small amount of fluid and gas with inflammatory change in the azygoesphageal recess.  ID is consulted to evaluate the patient.  Interval History: ". 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  She was on meropenem, then transitioned to ceftriaxone and metronidazole.  She was ultimately discharged on cefpodoxime and metronidazole with a plan to complete 4 weeks of antibiotics on February 15.  She is now re-admitted with respiratory distress.  ID consulted to assist with her care.    Small amount of fluid and gas with associated inflammatory changes in the azygoesophageal recess.  Per discussions with CT surgery and IR, no invasive procedures can be performed for this.  CXR on 2/15 with large left pleural effusion. Receiving CRRT and pending thoracentesis today.    Review of Systems   Unable to perform ROS: Severe respiratory distress     Objective:     Vital Signs (Most Recent):  Temp: 98.4 °F (36.9 °C) (02/15/24 0700)  Pulse: 63 (02/15/24 0915)  Resp: (!) 31 (02/15/24  0915)  BP: 120/74 (02/15/24 0915)  SpO2: 95 % (02/15/24 0915) Vital Signs (24h Range):  Temp:  [96.6 °F (35.9 °C)-98.5 °F (36.9 °C)] 98.4 °F (36.9 °C)  Pulse:  [61-93] 63  Resp:  [13-75] 31  SpO2:  [93 %-100 %] 95 %  BP: ()/(51-74) 120/74     Weight: 67.6 kg (149 lb)  Body mass index is 24.79 kg/m².    Estimated Creatinine Clearance: 28.2 mL/min (A) (based on SCr of 1.6 mg/dL (H)).     Physical Exam  Vitals and nursing note reviewed.   Constitutional:       Appearance: She is well-developed.   HENT:      Head: Normocephalic and atraumatic.      Right Ear: External ear normal.      Left Ear: External ear normal.   Eyes:      General: No scleral icterus.        Right eye: No discharge.         Left eye: No discharge.      Conjunctiva/sclera: Conjunctivae normal.   Cardiovascular:      Rate and Rhythm: Normal rate and regular rhythm.      Heart sounds: Normal heart sounds. No murmur heard.     No friction rub. No gallop.   Pulmonary:      Effort: Pulmonary effort is normal. Tachypnea present. No respiratory distress.      Breath sounds: No stridor. Examination of the left-middle field reveals decreased breath sounds. Examination of the left-lower field reveals decreased breath sounds. Decreased breath sounds present. No wheezing or rales.   Abdominal:      General: Bowel sounds are normal.      Palpations: Abdomen is soft.   Musculoskeletal:         General: No tenderness.      Right lower leg: No edema.      Left lower leg: No edema.   Skin:     General: Skin is warm and dry.   Neurological:      Mental Status: She is lethargic.          Significant Labs: BMP:   Recent Labs   Lab 02/15/24  0238   *  200*  200*   *  128*  128*   K 5.2*  5.2*  5.2*     101  101   CO2 14*  14*  14*   BUN 16  16  16   CREATININE 1.6*  1.6*  1.6*   CALCIUM 8.3*  8.3*  8.3*   MG 2.4  2.4  2.4     CBC:   Recent Labs   Lab 02/14/24  0305 02/15/24  0238   WBC 9.83 9.58   HGB 8.4* 8.9*   HCT 27.2*  28.8*    223     Microbiology Results (last 7 days)       Procedure Component Value Units Date/Time    Blood culture [[id number]] Collected: 02/12/24 0526    Order Status: Completed Specimen: Blood from Peripheral, Upper Arm, Right Updated: 02/15/24 0812     Blood Culture, Routine No Growth to date      No Growth to date      No Growth to date      No Growth to date    Blood culture [[id number]] Collected: 02/12/24 0526    Order Status: Completed Specimen: Blood from Peripheral, Antecubital, Right Updated: 02/15/24 0812     Blood Culture, Routine No Growth to date      No Growth to date      No Growth to date      No Growth to date            Significant Imaging: I have reviewed all pertinent imaging results/findings within the past 24 hours.

## 2024-02-15 NOTE — PLAN OF CARE
SICU PLAN OF CARE NOTE    Dx: Acute hypoxemic respiratory failure    Goals of Care: get fluid off    Vital Signs (last 12 hours):   Temp:  [97.8 °F (36.6 °C)-98.5 °F (36.9 °C)]   Pulse:  [69-95]   Resp:  [14-50]   BP: ()/(52-69)   SpO2:  [95 %-100 %]      Neuro: AAO x4 and Arouses to Voice    Cardiac: NSR    Respiratory: Nasal Cannula    Gtts: Amiodarone    Urine Output: Anuric 0   cc/shift    Dialysis: SCUF, UF Rate: 300/hr    Drains:     Diet: NPO             Labs/Accuchecks: ac/hs    Skin:  All skin remains free from injury.  Patient turned Q2, yes mattress inflated, and bed working correctly.    Shift Events:  repeat echo-no change in EF, pallative consult -they have not rounded yet.  Waiting for pt to be able to lay flay to do L/RHC. Will not drain fluid from azygoesophageal recess, too risky - treat with ABX.         See flowsheet for further assessment/details.  Family updated on current condition/plan of care, questions answered, and emotional support provided.  MD updated on current condition, vitals, labs, and gtts.  No new orders received, will continue to monitor.

## 2024-02-15 NOTE — NURSING
Discussed with MICU resident Reji about pt's lopressor order, as pt's BP had in low, SBP 80-90 and MAP in the 60s, and pt was already on Amio gtt. MD aware, stated to keep pt on amio gtt and only give half does of lopressor.

## 2024-02-15 NOTE — SUBJECTIVE & OBJECTIVE
Interval History:     No acute events overnight. Patient to go for thoracentesis by primary team today. Tolerated SLED well overnight, no issues. Worsened hyponatremia ~128 today. Repeat Labs pending.     Review of patient's allergies indicates:   Allergen Reactions    Crestor [rosuvastatin] Swelling    Gluten protein      Current Facility-Administered Medications   Medication Frequency    0.9%  NaCl infusion (CRRT USE ONLY) Continuous    0.9%  NaCl infusion Once    0.9%  NaCl infusion Continuous    acetaminophen tablet 650 mg Q4H PRN    albuterol-ipratropium 2.5 mg-0.5 mg/3 mL nebulizer solution 3 mL Q4H PRN    amiodarone (CORDARONE) 900 mg in dextrose 5 % (D5W) 250 mL (3.6 mg/mL) IV infusion - HIGH Continuous    atorvastatin tablet 40 mg QHS    dextrose 10% bolus 125 mL 125 mL PRN    dextrose 10% bolus 250 mL 250 mL PRN    droNABinol capsule 5 mg BID AC    ferrous gluconate tablet 324 mg Daily with breakfast    glucagon (human recombinant) injection 1 mg PRN    glucose chewable tablet 16 g PRN    glucose chewable tablet 24 g PRN    heparin (porcine) injection 5,000 Units Q8H    insulin aspart U-100 pen 0-5 Units QID (AC + HS) PRN    magnesium sulfate 2g in water 50mL IVPB (premix) PRN    melatonin tablet 6 mg Nightly PRN    metoprolol tartrate (LOPRESSOR) tablet 25 mg BID    mupirocin 2 % ointment BID    ondansetron injection 4 mg Q6H PRN    pantoprazole EC tablet 40 mg BID AC    piperacillin-tazobactam (ZOSYN) 4.5 g in dextrose 5 % in water (D5W) 100 mL IVPB (MB+) Q12H    pregabalin capsule 75 mg Daily    sodium chloride 0.9% flush 10 mL PRN    sodium phosphate 20.01 mmol in dextrose 5 % (D5W) 250 mL IVPB PRN    sodium phosphate 30 mmol in dextrose 5 % (D5W) 250 mL IVPB PRN    sodium phosphate 39.99 mmol in dextrose 5 % (D5W) 250 mL IVPB PRN    vancomycin (VANCOCIN) 500 mg in dextrose 5 % in water (D5W) 100 mL IVPB (MB+) Once    vancomycin - pharmacy to dose pharmacy to manage frequency    venlafaxine tablet 37.5  mg Daily       Objective:     Vital Signs (Most Recent):  Temp: 98.4 °F (36.9 °C) (02/15/24 0700)  Pulse: 79 (02/15/24 0900)  Resp: (!) 38 (02/15/24 0900)  BP: 97/63 (02/15/24 0900)  SpO2: 99 % (02/15/24 0900) Vital Signs (24h Range):  Temp:  [96.6 °F (35.9 °C)-98.5 °F (36.9 °C)] 98.4 °F (36.9 °C)  Pulse:  [61-93] 79  Resp:  [13-75] 38  SpO2:  [93 %-100 %] 99 %  BP: ()/(51-73) 97/63     Weight: 67.6 kg (149 lb) (02/14/24 1245)  Body mass index is 24.79 kg/m².  Body surface area is 1.76 meters squared.    I/O last 3 completed shifts:  In: 4767.1 [I.V.:4084.1; IV Piggyback:683]  Out: 4514 [Other:4514]     Physical Exam  Vitals and nursing note reviewed.   Constitutional:       General: She is not in acute distress.     Appearance: She is normal weight. She is not ill-appearing, toxic-appearing or diaphoretic.   HENT:      Head: Normocephalic and atraumatic.      Right Ear: External ear normal.      Left Ear: External ear normal.      Nose: Nose normal.   Cardiovascular:      Rate and Rhythm: Tachycardia present. Rhythm irregular.      Pulses: Normal pulses.      Heart sounds: Normal heart sounds. No murmur heard.     No friction rub. No gallop.   Pulmonary:      Comments: Moderately increased respiratory effort with bilateral crackles  Abdominal:      General: Abdomen is flat. Bowel sounds are normal. There is no distension.      Palpations: Abdomen is soft.      Tenderness: There is no abdominal tenderness. There is no guarding or rebound.   Musculoskeletal:         General: Swelling present. No tenderness. Normal range of motion.   Skin:     General: Skin is warm and dry.      Coloration: Skin is not jaundiced or pale.   Neurological:      General: No focal deficit present.      Mental Status: She is alert and oriented to person, place, and time. Mental status is at baseline.   Psychiatric:         Mood and Affect: Mood normal.         Behavior: Behavior normal.         Thought Content: Thought content normal.          Judgment: Judgment normal.          Significant Labs:  All labs within the past 24 hours have been reviewed.     Significant Imaging:  Labs: Reviewed

## 2024-02-15 NOTE — PROGRESS NOTES
Pharmacokinetic Assessment Follow Up: IV Vancomycin    Vancomycin serum concentration assessment(s):    Vancomycin random level resulted at 11.2 mcg/mL. Goal level is 10 to 20 mcg/mL.     Nephrology is consulted for ESRD and plans for SLED x 8 hours.     Drug levels (last 3 results):  Recent Labs   Lab Result Units 02/13/24  0334 02/14/24  0525   Vancomycin, Random ug/mL 7.1 11.2     Vancomycin Regimen Plan:    Administer an additional vancomycin  mg to 500 mg already given, for a total of 1000 mg. Next random level to be obtained with morning labs on 2/16.     Pharmacy will continue to follow and monitor vancomycin.    Please contact pharmacy at extension [phone] for questions regarding this assessment.    Thank you for the consult,   Rupa Lemus, PharmD, BCCCP                 Patient brief summary:  Lily Green is a 73 y.o. female initiated on antimicrobial therapy with IV vancomycin for treatment of sepsis      Drug Allergies:   Review of patient's allergies indicates:   Allergen Reactions    Crestor [rosuvastatin] Swelling    Gluten protein        Actual Body Weight:   67.6 kg    Renal Function:   Estimated Creatinine Clearance: 28.2 mL/min (A) (based on SCr of 1.6 mg/dL (H)).    Dialysis Method (if applicable):  SLED    CBC (last 72 hours):  Recent Labs   Lab Result Units 02/13/24  0334 02/14/24  0305 02/15/24  0238   WBC K/uL 11.06 9.83 9.58   Hemoglobin g/dL 9.2* 8.4* 8.9*   Hematocrit % 29.6* 27.2* 28.8*   Platelets K/uL 178 188 223   Gran % % 80.1* 78.2* 82.4*   Lymph % % 8.4* 10.9* 8.8*   Mono % % 10.8 10.3 7.9   Eosinophil % % 0.0 0.0 0.1   Basophil % % 0.1 0.1 0.1   Differential Method  Automated Automated Automated         Metabolic Panel (last 72 hours):  Recent Labs   Lab Result Units 02/12/24 2120 02/13/24  0334 02/13/24  1354 02/13/24  2201 02/14/24  0305 02/14/24  2221 02/15/24  0238   Sodium mmol/L 131* 131*  131* 130* 130* 129*  129* 127*  127* 128*  128*  128*    Potassium mmol/L 4.1 4.3  4.3 3.9 4.3 4.2  4.2 4.8  4.8 5.2*  5.2*  5.2*   Chloride mmol/L 99 97  97 98 98 97  97 99  99 101  101  101   CO2 mmol/L 24 23  23 22* 23 22*  22* 17*  17* 14*  14*  14*   Glucose mg/dL 170* 172*  172* 261* 221* 299*  299* 214*  214* 200*  200*  200*   BUN mg/dL 18 9  9 15 9 5*  5* 12  12 16  16  16   Creatinine mg/dL 2.2* 1.1  1.1 1.5* 1.1 0.8  0.8 1.3  1.3 1.6*  1.6*  1.6*   Albumin g/dL 2.3* 2.1*  2.1* 2.1* 2.1* 2.0*  2.0* 2.1*  2.1* 2.2*  2.2*  2.2*   Total Bilirubin mg/dL  --  0.3  --   --  0.3  --  0.3   Alkaline Phosphatase U/L  --  93  --   --  88  --  108   AST U/L  --  29  --   --  18  --  53*   ALT U/L  --  10  --   --  9*  --  28   Magnesium mg/dL 1.8 2.1  2.1 1.8 2.1 1.9  1.9 2.4  2.4 2.4  2.4  2.4   Phosphorus mg/dL 1.6* 3.7  3.7 4.4 2.4* 3.0  3.0 5.2*  5.2* 5.6*  5.6*  5.6*         Vancomycin Administrations:  vancomycin given in the last 96 hours                     vancomycin (VANCOCIN) 1,000 mg in dextrose 5 % (D5W) 250 mL IVPB (Vial-Mate) (mg) 1,000 mg New Bag 02/13/24 1337    vancomycin (VANCOCIN) 1,000 mg in dextrose 5 % (D5W) 250 mL IVPB (Vial-Mate) (mg) 1,000 mg New Bag 02/12/24 0533                    Microbiologic Results:  Microbiology Results (last 7 days)       Procedure Component Value Units Date/Time    Gram stain [[id number]]     Order Status: No result Specimen: Pleural Fluid     Culture, Anaerobe [[id number]]     Order Status: No result Specimen: Pleural Fluid     Aerobic culture [[id number]]     Order Status: No result Specimen: Pleural Fluid     KOH prep [[id number]]     Order Status: No result Specimen: Pleural Fluid     Fungus culture [[id number]]     Order Status: No result Specimen: Pleural Fluid     Blood culture [[id number]] Collected: 02/12/24 0526    Order Status: Completed Specimen: Blood from Peripheral, Upper Arm, Right Updated: 02/15/24 0812     Blood Culture, Routine No Growth to date      No  Growth to date      No Growth to date      No Growth to date    Blood culture [[id number]] Collected: 02/12/24 0526    Order Status: Completed Specimen: Blood from Peripheral, Antecubital, Right Updated: 02/15/24 0812     Blood Culture, Routine No Growth to date      No Growth to date      No Growth to date      No Growth to date

## 2024-02-15 NOTE — ASSESSMENT & PLAN NOTE
Nutrition consulted. Most recent weight and BMI monitored-     Measurements:  Wt Readings from Last 1 Encounters:   02/14/24 67.6 kg (149 lb)   Body mass index is 24.79 kg/m².    Patient has been screened and assessed by RD.    Malnutrition Type:  Context: chronic illness  Level: moderate    Malnutrition Characteristic Summary:  Energy Intake (Malnutrition): less than 75% for greater than or equal to 1 month  Muscle Mass (Malnutrition): mild depletion  Fluid Accumulation (Malnutrition): severe    Interventions/Recommendations (treatment strategy):  1. Continue gluten free diet with texture per SLP  2. [place] [facility] [place]  3. RD to monitor and follow

## 2024-02-15 NOTE — ASSESSMENT & PLAN NOTE
ESRD   Hypoxic resp failure - improved with 1.5 liter removal;   ECHO 2/12 with newly depressed EF of 10-15% from 60-65% one month prior    Plan/Recommendation  -Will plan for nocturnal SLED after thoracentesis performed. Increase UF as BP allows to help with hyponatremia, and hyperkalemia.    -Keep MAP > 65  -Keep hemoglobin > 7  -Strict ins and outs  -Avoid nephrotoxic agents if possible and renally dose medications  -Avoid drastic hemodynamic changes if possible

## 2024-02-15 NOTE — ASSESSMENT & PLAN NOTE
Calcium   Date Value Ref Range Status   02/15/2024 8.3 (L) 8.7 - 10.5 mg/dL Final   02/15/2024 8.3 (L) 8.7 - 10.5 mg/dL Final   02/15/2024 8.3 (L) 8.7 - 10.5 mg/dL Final     Phosphorus   Date Value Ref Range Status   02/15/2024 5.6 (H) 2.7 - 4.5 mg/dL Final   02/15/2024 5.6 (H) 2.7 - 4.5 mg/dL Final   02/15/2024 5.6 (H) 2.7 - 4.5 mg/dL Final     PTH, Intact   Date Value Ref Range Status   02/02/2024 111.8 (H) 9.0 - 77.0 pg/mL Final

## 2024-02-15 NOTE — PROGRESS NOTES
[facility] - Surgical Intensive Care  Nephrology  Progress Note    Patient Name: Lily Green  MRN: [id number]  Admission Date: 2/12/2024  Hospital Length of Stay: 3 days  Attending Provider: Meghan Trujillo MD   Primary Care Physician: Pavel Calixto MD  Principal Problem:Acute hypoxemic respiratory failure    Subjective:     HPI: 73 year old female with a histoyr of ESRD(MW), HTN, HLD, T2DM, IBS, RML carcinoid tumor admitted to [facility] for hypoxic respiratory distress. Initially thought to be from volume overload, however emergent HD done overnight resulted in Afib with RVR. She initially presented with hypoxic resp failure requiring NC satting 70% with 2 liters. MICU was called and bipap was started with improvement in respiratory status.     She was previously admitted to [facility] and underwent a right thoracotomy for newly found RML carcinoid tumor. Course was complicated by right sided chest tube placement and new onset afib with RVR and GIB (EGD with gastric ulcers).     Outpatient ESRD  Hemodialysis  Outpatient nephrologist: Dr. Vyas  Outpatient center: [facility]  Dialysis schedule: [facility]  Last treatment: 2/10/24  Anuric: Minimal  Access: right tunnled dialysis cather; has left fistula, pending maturation      Interval History:     No acute events overnight. Patient to go for thoracentesis by primary team today. Tolerated SLED well overnight, no issues. Worsened hyponatremia ~128 today. Repeat Labs pending.     Review of patient's allergies indicates:   Allergen Reactions    Crestor [rosuvastatin] Swelling    Gluten protein      Current Facility-Administered Medications   Medication Frequency    0.9%  NaCl infusion (CRRT USE ONLY) Continuous    0.9%  NaCl infusion Once    0.9%  NaCl infusion Continuous    acetaminophen tablet 650 mg Q4H PRN    albuterol-ipratropium 2.5 mg-0.5 mg/3 mL nebulizer solution 3 mL Q4H PRN    amiodarone (CORDARONE) 900 mg in dextrose 5 % (D5W) 250 mL (3.6 mg/mL) IV infusion -  HIGH Continuous    atorvastatin tablet 40 mg QHS    dextrose 10% bolus 125 mL 125 mL PRN    dextrose 10% bolus 250 mL 250 mL PRN    droNABinol capsule 5 mg BID AC    ferrous gluconate tablet 324 mg Daily with breakfast    glucagon (human recombinant) injection 1 mg PRN    glucose chewable tablet 16 g PRN    glucose chewable tablet 24 g PRN    heparin (porcine) injection 5,000 Units Q8H    insulin aspart U-100 pen 0-5 Units QID (AC + HS) PRN    magnesium sulfate 2g in water 50mL IVPB (premix) PRN    melatonin tablet 6 mg Nightly PRN    metoprolol tartrate (LOPRESSOR) tablet 25 mg BID    mupirocin 2 % ointment BID    ondansetron injection 4 mg Q6H PRN    pantoprazole EC tablet 40 mg BID AC    piperacillin-tazobactam (ZOSYN) 4.5 g in dextrose 5 % in water (D5W) 100 mL IVPB (MB+) Q12H    pregabalin capsule 75 mg Daily    sodium chloride 0.9% flush 10 mL PRN    sodium phosphate 20.01 mmol in dextrose 5 % (D5W) 250 mL IVPB PRN    sodium phosphate 30 mmol in dextrose 5 % (D5W) 250 mL IVPB PRN    sodium phosphate 39.99 mmol in dextrose 5 % (D5W) 250 mL IVPB PRN    vancomycin (VANCOCIN) 500 mg in dextrose 5 % in water (D5W) 100 mL IVPB (MB+) Once    vancomycin - pharmacy to dose pharmacy to manage frequency    venlafaxine tablet 37.5 mg Daily       Objective:     Vital Signs (Most Recent):  Temp: 98.4 °F (36.9 °C) (02/15/24 0700)  Pulse: 79 (02/15/24 0900)  Resp: (!) 38 (02/15/24 0900)  BP: 97/63 (02/15/24 0900)  SpO2: 99 % (02/15/24 0900) Vital Signs (24h Range):  Temp:  [96.6 °F (35.9 °C)-98.5 °F (36.9 °C)] 98.4 °F (36.9 °C)  Pulse:  [61-93] 79  Resp:  [13-75] 38  SpO2:  [93 %-100 %] 99 %  BP: ()/(51-73) 97/63     Weight: 67.6 kg (149 lb) (02/14/24 1245)  Body mass index is 24.79 kg/m².  Body surface area is 1.76 meters squared.    I/O last 3 completed shifts:  In: 4767.1 [I.V.:4084.1; IV Piggyback:683]  Out: 4514 [Other:4514]     Physical Exam  Vitals and nursing note reviewed.   Constitutional:       General: She  is not in acute distress.     Appearance: She is normal weight. She is not ill-appearing, toxic-appearing or diaphoretic.   HENT:      Head: Normocephalic and atraumatic.      Right Ear: External ear normal.      Left Ear: External ear normal.      Nose: Nose normal.   Cardiovascular:      Rate and Rhythm: Tachycardia present. Rhythm irregular.      Pulses: Normal pulses.      Heart sounds: Normal heart sounds. No murmur heard.     No friction rub. No gallop.   Pulmonary:      Comments: Moderately increased respiratory effort with bilateral crackles  Abdominal:      General: Abdomen is flat. Bowel sounds are normal. There is no distension.      Palpations: Abdomen is soft.      Tenderness: There is no abdominal tenderness. There is no guarding or rebound.   Musculoskeletal:         General: Swelling present. No tenderness. Normal range of motion.   Skin:     General: Skin is warm and dry.      Coloration: Skin is not jaundiced or pale.   Neurological:      General: No focal deficit present.      Mental Status: She is alert and oriented to person, place, and time. Mental status is at baseline.   Psychiatric:         Mood and Affect: Mood normal.         Behavior: Behavior normal.         Thought Content: Thought content normal.         Judgment: Judgment normal.          Significant Labs:  All labs within the past 24 hours have been reviewed.     Significant Imaging:  Labs: Reviewed  Assessment/Plan:     Pulmonary  * Acute hypoxemic respiratory failure  Agree with thora today.   UF with RRT    Renal/  ESRD (end stage renal disease)  ESRD   Hypoxic resp failure - improved with 1.5 liter removal;   ECHO 2/12 with newly depressed EF of 10-15% from 60-65% one month prior    Plan/Recommendation  -Will plan for nocturnal SLED after thoracentesis performed. Increase UF as BP allows to help with hyponatremia, and hyperkalemia.    -Keep MAP > 65  -Keep hemoglobin > 7  -Strict ins and outs  -Avoid nephrotoxic agents if  possible and renally dose medications  -Avoid drastic hemodynamic changes if possible        Oncology  Anemia in ESRD (end-stage renal disease)  Goal hemoglobin in ESRD is 10-12  Hemoglobin   Date Value Ref Range Status   02/15/2024 8.9 (L) 12.0 - 16.0 g/dL Final     Iron   Date Value Ref Range Status   02/02/2024 52 30 - 160 ug/dL Final     Transferrin   Date Value Ref Range Status   02/02/2024 121 (L) 200 - 375 mg/dL Final     TIBC   Date Value Ref Range Status   02/02/2024 179 (L) 250 - 450 ug/dL Final     Saturated Iron   Date Value Ref Range Status   02/02/2024 29 20 - 50 % Final     Ferritin   Date Value Ref Range Status   02/02/2024 1,803 (H) 20.0 - 300.0 ng/mL Final       Endocrine  Secondary hyperparathyroidism  Calcium   Date Value Ref Range Status   02/15/2024 8.3 (L) 8.7 - 10.5 mg/dL Final   02/15/2024 8.3 (L) 8.7 - 10.5 mg/dL Final   02/15/2024 8.3 (L) 8.7 - 10.5 mg/dL Final     Phosphorus   Date Value Ref Range Status   02/15/2024 5.6 (H) 2.7 - 4.5 mg/dL Final   02/15/2024 5.6 (H) 2.7 - 4.5 mg/dL Final   02/15/2024 5.6 (H) 2.7 - 4.5 mg/dL Final     PTH, Intact   Date Value Ref Range Status   02/02/2024 111.8 (H) 9.0 - 77.0 pg/mL Final             Thank you for your consult. I will follow-up with patient. Please contact us if you have any additional questions.    Case discussed with attending. Attestation to follow.       Henri Carlisle DO  Nephrology  [facility] - [facility] Int[facility]

## 2024-02-15 NOTE — PT/OT/SLP EVAL
Physical Therapy Co-Evaluation with OT and Treatment     Patient Name:  Lily Green  MRN: [id number]    Admit Date: 2/12/2024  Admitting Diagnosis:  Acute hypoxemic respiratory failure  Length of Stay: 3 days  Recent Surgery: * No surgery found *      Recommendations:     Discharge Recommendations: Moderate intensity therapy  Equipment recommendations: wheelchair  Barriers to discharge: Increased level of skilled assistance required    Assessment:     Lily Green is a 73 y.o. female admitted to [facility] on 2/12/2024 with medical diagnosis of Acute hypoxemic respiratory failure. Pt presents with weakness, impaired endurance, impaired self care skills, impaired functional mobility, decreased coordination, impaired cardiopulmonary response to activity. These deficits effect their roles and responsibilities in which they were able to complete prior to admit.     Pt agreeable to therapy session; she does shake her head no when asking if she wants to sit eob. Pt is known to writing therapist from prior admission. Pt discharged to [facility] and was there for ~5 days, where she was able to ambulate using RW with a chair follow with therapy. Pt's respiratory rate fluctuated entire session: 20s to low 40s. Pt able to perform x10 ankle pumps with resistance added during PF and x5 assisted SLR (able to initiate movement). Unable to tolerate rolling this session. Will continue to progress pt as tolerated.    Lily Green would benefit from acute PT intervention to improve quality of life, focus on recovery of impairments, provide patient/caregiver education, reduce fall risk, and maximize (I) and safety with functional mobility. Once medically stable, recommending pt discharge to moderate intensity therapy. Patient currently demonstrates a need for moderate intensity therapy on a daily basis post acute secondary to a decline in functional status due to illness .      Rehab Prognosis:  Good    Plan:     During this hospitalization, patient to be seen 3 x/week to address the identified rehab impairments via gait training, therapeutic activities, therapeutic exercises, neuromuscular re-education and progress towards stated goals.     Plan of Care Expires:  03/16/24  Plan of Care reviewed with: patient, spouse    This plan of care has been discussed with the patient/caregiver, who was included in its development and is in agreement with the identified goals and treatment plan.     Subjective     Communicated with RN prior to session.  Patient found supine upon PT entry to room, agreeable to evaluation. Pt's  present during session.    Chief Complaint: increased WOB and respiratory rate    Patient/Family Comments/goals: none stated    Pain/Comfort:  Pain Rating 1: 0/10  Pain Rating Post-Intervention 1: 0/10    Patients cultural, spiritual, Alevism conflicts given the current situation: None identified     Patient History: information obtained from      Living Environment: Pt lives with  in single level home  with threshold OSMIN.   Prior Level of Function:  ambulating with RW and chair follow at SNF  DME owned: shower chair  Support Available/Caregiver Assistance: ; caregiver 6 hr/day, 3 days/wk    Objective:   OT present for coeval due to pt's multiple medical comorbidities and functional/cognition deficits requiring two skilled therapists to appropriately progress pt's musculoskeletal strength, neuromuscular control, and endurance while taking into consideration medical acuity and pt safety.    Patient found with: blood pressure cuff, Trialysis, telemetry, pulse ox (continuous), SCD, peripheral IV, pressure relief boots, oxygen, bradford catheter (CRRT)    Recent Surgery: * No surgery found *    General Precautions: Standard, fall   Orthopedic Precautions:    Braces:     Oxygen Device: nasal cannula 2L      Exams:    Cognition:  Alert  Command following: Follows multistep  verbal commands  Communication: clear/fluent (more head nodding vs. Verbalizing during session)    Sensation:   Light touch sensation: Intact BLEs    Gross Motor Coordination: No deficits noted during functional mobility tasks     Edema/Skin Integrity: None noted; Visible skin intact    Postural examination/scapula alignment: YUSUF    Lower Extremity Range of Motion:  Right Lower Extremity: WFL  Left Lower Extremity: WFL    Lower Extremity Strength:  Right Lower Extremity: Deficits: grossly 2/5 aside from DF/PF 4/5  Left Lower Extremity: Deficits: grossly 2/5 aside from DF/PF 4/5    Functional Mobility:    Bed Mobility:  Unable to tolerate rolling    Outcome Measure: AM-PAC 6 CLICK MOBILITY  Total Score:7     Patient/Caregiver Education:   X10 ankle pumps with resistance added during PF  X5 assisted SLR    Therapist educated pt/caregiver regarding:   PT POC and goals for therapy   Safety with mobility and fall risk   Instruction on use of call button and importance of calling nursing staff for assistance with mobility   Time provided for therapeutic counseling and discussion of current health disposition. All questions answered to satisfaction, within scope of PT practice     Patient/caregiver able to verbalize understanding and expressed no further questions this visit; will follow-up with pt/caregiver during current admit for additional questions/concerns within scope of practice.     White board updated.     Patient left supine with all lines intact, call button in reach, and nsg notified.    GOALS:   Multidisciplinary Problems       Physical Therapy Goals          Problem: Physical Therapy    Goal Priority Disciplines Outcome Goal Variances Interventions   Physical Therapy Goal     PT, PT/OT Ongoing, Progressing     Description: Goals to be met by: 3/16/24     Patient will increase functional independence with mobility by performin. Supine to sit with Contact Guard Assistance  2. Sit to supine with Contact  Guard Assistance  3. Sit to stand transfer with Contact Guard Assistance  4. Bed to chair transfer with Contact Guard Assistance using Rolling Walker  5. Gait  x 50 feet with Contact Guard Assistance using Rolling Walker.                            History:     Past Medical History:   Diagnosis Date    Anxiety     Celiac disease 2018    Celiac disease     Depression     Diabetes mellitus     Family history of breast cancer in mother      at age 68    Hyperlipidemia     Hypertension     Meniere disease     Meniere's disease, unspecified ear     Menopause     Peptic ulcer     Reflux esophagitis     Urinary tract infection     Vaginal infection     Vaginal Pap smear 2014    Pap/Hpv Negative        Past Surgical History:   Procedure Laterality Date    APPENDECTOMY      BREAST SURGERY      Tags    CARPAL TUNNEL RELEASE Bilateral 2017    ,     CLOSURE, COLOSTOMY Left 2023    Procedure: CLOSURE, COLOSTOMY;  Surgeon: Manuel Javier MD;  Location: [facility] OR;  Service: General;  Laterality: Left;    COLONOSCOPY  2018    Normal  ( Juan A)     COLOSTOMY Right 2023    Procedure: CREATION, COLOSTOMY;  Surgeon: Manuel Javier MD;  Location: [facility] OR;  Service: General;  Laterality: Right;    DILATION AND CURETTAGE OF UTERUS  1986    DUPUYTREN CONTRACTURE RELEASE Bilateral 2017    ESOPHAGOGASTRODUODENOSCOPY N/A 2024    Procedure: EGD (ESOPHAGOGASTRODUODENOSCOPY);  Surgeon: Yamilet Walters MD;  Location: [facility] ([address] [place] [address]);  Service: Gastroenterology;  Laterality: N/A;    ESOPHAGOGASTRODUODENOSCOPY N/A 2024    Procedure: EGD (ESOPHAGOGASTRODUODENOSCOPY);  Surgeon: Yamilet Walters MD;  [facility]  Service: Gastroenterology;  Laterality: N/A;    HYSTERECTOMY      BEAU w/ appy, no BSO     IMPLANTATION OF LEADLESS PACEMAKER N/A 2024    Procedure: INSERTION, CARDIAC PACEMAKER, LEADLESS;  Surgeon: LENIN Frances MD;  [facility]: [facility] LAB;   Service: Cardiology;  Laterality: N/A;  SB, LEADLESS PPM (MICRA), MDT, ANES, EH, CVICU [id number]    INJECTION OF NEUROLYTIC AGENT AROUND LUMBAR SYMPATHETIC NERVE N/A 1/6/2022    Procedure: BLOCK, LUMBAR SYMPATHETIC;  Surgeon: Souleymane Rizo Jr., MD;  Location: [facility] PAIN MGT;  Service: Pain Management;  Laterality: N/A;  no pacemaker   pt is diabetic     INJECTION OF NEUROLYTIC AGENT AROUND LUMBAR SYMPATHETIC NERVE N/A 8/25/2022    Procedure: BLOCK, LUMBAR SYMPATHETIC;  Surgeon: Souleymane Rizo Jr., MD;  Location: [facility] PAIN MGT;  Service: Pain Management;  Laterality: N/A;  diabetic     INSERTION OF TUNNELED CENTRAL VENOUS HEMODIALYSIS CATHETER Right 1/25/2023    Procedure: INSERTION, CATHETER, HEMODIALYSIS, DUAL LUMEN;  Surgeon: Manuel Javier MD;  Location: [facility] OR;  Service: General;  Laterality: Right;    INSERTION, CATHETER, TUNNELED Left 1/28/2023    Procedure: Insertion,catheter,tunneled;  Surgeon: Watson Fontenot MD;  Location: [facility] OR;  Service: General;  Laterality: Left;    LAPAROTOMY, EXPLORATORY N/A 1/14/2023    Procedure: LAPAROTOMY, EXPLORATORY;  Surgeon: Manuel Javier MD;  Location: [facility] OR;  Service: General;  Laterality: N/A;    LAPAROTOMY, EXPLORATORY N/A 1/16/2023    Procedure: LAPAROTOMY, EXPLORATORY;  Surgeon: Manuel Javier MD;  Location: [facility] OR;  Service: General;  Laterality: N/A;    LYMPHADENECTOMY Right 1/2/2024    Procedure: LYMPHADENECTOMY;  Surgeon: Tan Thomson MD;  [address]  Service: Cardiothoracic;  Laterality: Right;    PACEMAKER, TEMPORARY, TRANSVENOUS, PEDIATRIC Right 1/12/2024    Procedure: Pacemaker, Temporary, Transvenous;  Surgeon: Todd Carlisle MD;  Location: [facility] CATH LAB;  Service: Cardiology;  Laterality: Right;    REMOVAL OF CATHETER Right 1/28/2023    Procedure: REMOVAL, CATHETER;  Surgeon: Watson Fontenot MD;  Location: [facility] OR;  Service: General;  Laterality: Right;    REMOVAL, TUNNELED CATH Right 1/25/2023    Procedure:  REMOVAL, TUNNELED CATH;  Surgeon: Manuel Javier MD;  Location: [facility];  Service: General;  Laterality: Right;    ROBOTIC BRONCHOSCOPY N/A 10/20/2023    Procedure: ROBOTIC BRONCHOSCOPY;  Surgeon: Mayda Monzon MD;  Location: [facility] OR [address] [place] [address];  Service: Pulmonary;  Laterality: N/A;    SHOULDER SURGERY  2009 & 2010    right rotator cuff    THORACOTOMY Right 1/2/2024    Procedure: THORACOTOMY;  Surgeon: Tan Thomson MD;  [facility];  Service: Cardiothoracic;  Laterality: Right;    THORACOTOMY, WITH INITIAL THERAPEUTIC WEDGE RESECTION OF LUNG Right 1/2/2024    Procedure: THORACOTOMY, WITH INITIAL THERAPEUTIC WEDGE RESECTION OF LUNG;  Surgeon: Tan Thomson MD;  [facility];  Service: Cardiothoracic;  Laterality: Right;    TONSILLECTOMY      UPPER GASTROINTESTINAL ENDOSCOPY  04/2018       Time Tracking:     PT Received On: 02/15/24  PT Start Time: 0857     PT Stop Time: 0916  PT Total Time (min): 19 min     Billable Minutes: Evaluation 8 and Therapeutic Exercise 11    02/15/2024

## 2024-02-15 NOTE — PROGRESS NOTES
02/15/24 0318   Treatment   Treatment Type SCUF   Treatment Status Restart;Daily equipment check   Dialysis Machine Number [id number]   Dialyzer Time (hours) 0   BVP (Liters) 0 L   Solutions Labeled and Current  Yes   Access Right;IJ;Temporary Cath   Catheter Dressing Intact  Yes   Alarms Engaged Yes   CRRT Comments   (CRRT/SLED treatment restarted due to patient clotted earlier tonight and ICU RN had to rinse patient back and stop treatment. 12 hour SCUF treatment resumed with 7 hours and 48 minutes remaining.)     ICU RN notified that patient is back on SCUF with 7 hours and 48 minutes remaining of a 12 hour treatment and to rinse patient back at 1106AM.

## 2024-02-15 NOTE — PROGRESS NOTES
02/15/24 0625   Treatment   Treatment Type SLED   Treatment Status Order change   Dialyzer Time (hours) 3.09   BVP (Liters) 0 L   Solutions Labeled and Current  Yes   Access Right;IJ;Temporary Cath   Catheter Dressing Intact  Yes   Alarms Engaged Yes   CRRT Comments   (Changed to SLED as ordered.)     Patient to run on SLED for 8 hours and due to stop SLED today at 1425.

## 2024-02-15 NOTE — EICU
Intervention Initiated From:  Bedside    Sonny intervened regarding:  Documentation and Time-Out    Nurse Notified:  Yes    Doctor Notified:  Yes    Comments: Called to room by Dr. Trujillo to perform left-sided Thoracentesis. No complications noted during procedure. 1450cc fluid removed.

## 2024-02-15 NOTE — PLAN OF CARE
PT evaluation complete - see note for details. POC and goals established.    Problem: Physical Therapy  Goal: Physical Therapy Goal  Description: Goals to be met by: 3/16/24     Patient will increase functional independence with mobility by performin. Supine to sit with Contact Guard Assistance  2. Sit to supine with Contact Guard Assistance  3. Sit to stand transfer with Contact Guard Assistance  4. Bed to chair transfer with Contact Guard Assistance using Rolling Walker  5. Gait  x 50 feet with Contact Guard Assistance using Rolling Walker.     Outcome: Ongoing, Progressing   2/15/2024

## 2024-02-15 NOTE — PROGRESS NOTES
[facility] - [facility] Intensive Care  Critical Care Medicine  Progress Note    Patient Name: Lily Green  MRN: [id number]  Admission Date: 2/12/2024  Hospital Length of Stay: 3 days  Code Status: Full Code  Attending Provider: Meghan Trujillo MD  Primary Care Provider: Pvael Calxito MD   Principal Problem: Acute hypoxemic respiratory failure    Subjective:     HPI:  Ms. Green is a 73 y.o. 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 She was last admitted from 1/2/24-2/5/24 (35 days) on the thoracic surgery service during which she underwent a right lower lobectomy for resection of her carcinoid tumor and MLND, ultimately requiring a thoracotomy. Post-op, she received a right sided chest tube. 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 Subsequent GIB and discovery of nonbleeding gastric ulcers on EGD made full anticoagulation high risk, so it was stopped. She was ultimately Dc'd to [facility] in [place] where she presents from now.     She presents today from SNF with increased work of breathing, dyspnea, and hypoxia, with oxygen saturation in the 70% on 2L. She became acutely short of breath today and was brought in by EMS after being put on CPAP in the field with some improvement. Placed on BIPAP in the ED. She continues to be tachycardic and tachypnic. She last completed a full session of HD on Saturday. She reports cough and nausea, but denies fever, chills, pain, chest pain, neck pain. She has little residual renal function . Unclear if she has been taking her Torsemide listed as her home med. Her workup was significant for BNP of 2754, Trop 0.312. VBG reveals pH 7.438/38.9/29. EKG reveals sinus tachycardia. CXR has evident pleural effusions and possible multifocal pneumonia. Nephro was consulted  and emergent HD orders were placed with goal of removing 3 L urgently. She will be admitted to MICU for hypoxic respiratory failure and increased work of breathing. CTA PE study is still pending at time of evaluation since patient is getting HD.     Hospital/ICU Course:  2/12:  Patient admitted for hypoxic respiratory failure.  Echocardiogram revealed new low LVEF with elevated troponins.  Cardiology consulted.  2/13:  Patient developed AFib with RVR this morning--started amiodarone infusion.  Restarted on SLED overnight.  CT findings with a couple fluid collections in the right paraesophageal space and azygoesophageal recess.  2/14:  CT Surgery consulted and recommended IR consultation for evaluation for drainage; unfortunately, no safe window for approach.  Interventional Cardiology deferring angiography due to respiratory status.  Palliative Care consulted.  10 hours SLED overnight with removal of 2.1 L. A couple episodes of RVR overnight--increasing metoprolol this morning.  2/15:  Clotted off on CRRT overnight and restarted.  Plan for thoracentesis this afternoon after CRRT.    Interval History/Significant Events:  Still uncomfortable this morning.  Weaning down oxygen steadily.  Currently on CRRT.  Plan for thoracentesis this afternoon after CRRT.  Transitioning to oral amiodarone.    Review of Systems   Respiratory:  Positive for shortness of breath.    Cardiovascular:  Negative for chest pain.   All other systems reviewed and are negative.    Objective:     Vital Signs (Most Recent):  Temp: 98.4 °F (36.9 °C) (02/15/24 0700)  Pulse: 81 (02/15/24 0800)  Resp: 20 (02/15/24 0800)  BP: 94/63 (02/15/24 0800)  SpO2: 96 % (02/15/24 0800) Vital Signs (24h Range):  Temp:  [96.6 °F (35.9 °C)-98.5 °F (36.9 °C)] 98.4 °F (36.9 °C)  Pulse:  [61-95] 81  Resp:  [13-75] 20  SpO2:  [93 %-100 %] 96 %  BP: ()/(51-73) 94/63   Weight: 67.6 kg (149 lb)  Body mass index is 24.79 kg/m².      Intake/Output Summary (Last 24  hours) at 2/15/2024 0815  Last data filed at 2/15/2024 0800  Gross per 24 hour   Intake 2482.29 ml   Output 2644 ml   Net -161.71 ml            Physical Exam  Vitals and nursing note reviewed.   Constitutional:       General: She is not in acute distress.     Appearance: She is normal weight. She is not ill-appearing, toxic-appearing or diaphoretic.   HENT:      Head: Normocephalic and atraumatic.      Right Ear: External ear normal.      Left Ear: External ear normal.      Nose: Nose normal.   Cardiovascular:      Rate and Rhythm: Tachycardia present. Rhythm irregular.      Pulses: Normal pulses.      Heart sounds: Normal heart sounds. No murmur heard.     No friction rub. No gallop.   Pulmonary:      Comments: Moderately increased respiratory effort with bilateral crackles  Abdominal:      General: Abdomen is flat. Bowel sounds are normal. There is no distension.      Palpations: Abdomen is soft.      Tenderness: There is no abdominal tenderness. There is no guarding or rebound.   Musculoskeletal:         General: Swelling present. No tenderness. Normal range of motion.   Skin:     General: Skin is warm and dry.      Coloration: Skin is not jaundiced or pale.   Neurological:      General: No focal deficit present.      Mental Status: She is alert and oriented to person, place, and time. Mental status is at baseline.   Psychiatric:         Mood and Affect: Mood normal.         Behavior: Behavior normal.         Thought Content: Thought content normal.         Judgment: Judgment normal.            Vents:  Oxygen Concentration (%): 32 (02/13/24 0019)  Lines/Drains/Airways       Central Venous Catheter Line  Duration             Permacath right subclavian -- days         Hemodialysis Catheter 01/25/23 1501 right internal jugular 385 days              Peripheral Intravenous Line  Duration                  Hemodialysis AV Fistula 09/01/23 0800 Left forearm 167 days         Peripheral IV - Single Lumen 02/12/24 0207 18 G  Anterior;Proximal;Right Forearm 3 days         Peripheral IV - Single Lumen 02/12/24 1741 22 G;1 in Anterior;Right Wrist 2 days                  Significant Labs:    CBC/Anemia Profile:  Recent Labs   Lab 02/14/24  0305 02/15/24  0238   WBC 9.83 9.58   HGB 8.4* 8.9*   HCT 27.2* 28.8*    223   MCV 97 97   RDW 22.5* 22.5*          Chemistries:  Recent Labs   Lab 02/14/24  0305 02/14/24  2221 02/15/24  0238   *  129* 127*  127* 128*  128*  128*   K 4.2  4.2 4.8  4.8 5.2*  5.2*  5.2*   CL 97  97 99  99 101  101  101   CO2 22*  22* 17*  17* 14*  14*  14*   BUN 5*  5* 12  12 16  16  16   CREATININE 0.8  0.8 1.3  1.3 1.6*  1.6*  1.6*   CALCIUM 7.4*  7.4* 8.5*  8.5* 8.3*  8.3*  8.3*   ALBUMIN 2.0*  2.0* 2.1*  2.1* 2.2*  2.2*  2.2*   PROT 5.7*  --  6.1   BILITOT 0.3  --  0.3   ALKPHOS 88  --  108   ALT 9*  --  28   AST 18  --  53*   MG 1.9  1.9 2.4  2.4 2.4  2.4  2.4   PHOS 3.0  3.0 5.2*  5.2* 5.6*  5.6*  5.6*         All pertinent labs within the past 24 hours have been reviewed.    Significant Imaging:  I have reviewed all pertinent imaging results/findings within the past 24 hours.    ABG  Recent Labs   Lab 02/13/24  0956 02/14/24  0826   PH 7.399  --    PO2 35* 34*   PCO2 36.8  --    HCO3 22.8*  --    BE -2  --      Assessment/Plan:     Psychiatric  Generalized anxiety disorder  - Continue home venlafaxine    Pulmonary  * Acute hypoxemic respiratory failure  Patient with Hypoxic Respiratory failure which is Acute on chronic.  she is on home oxygen at 2 LPM. Supplemental oxygen was provided and noted-      .   Signs/symptoms of respiratory failure include- tachypnea, increased work of breathing, respiratory distress, and use of accessory muscles. Contributing diagnoses includes - Pleural effusion Labs and images were reviewed. Patient Has recent ABG, which has been reviewed. Will treat underlying causes and adjust management of respiratory failure as follows-      Patient without CHF on most recent Echo (01/12/24) which demonstrated EF 60-65%, no significant valvulopathy, borderline pulm HTN. On torsemide at baseline. Patient reports having some high sodium foods lately. High suspicion for hypervolemia given BNP elevation, though this may be confounded by CKD.     - Appreciated emergent HD by Nephro for volume management  - Admitted on BiPAP but weaned to nasal cannula 5 liters/minute after HD  - Repeat echocardiogram with new LVEF 15-20% with concerns for hypoperfusion  - Cardiology consulted  - Interventional Cardiology deferring coronary angiography due to respiratory status    Pleural effusion on left  On Cefpodoxime and Flagyl until planned end date of 02/15/24 per ID recs. Given recent hospitalization and re-admission will start broad spectrum Abx.     - f/u Bcx, de-escalate pending cultures    Cardiac/Vascular  New onset of congestive heart failure  Patient is identified as having Systolic (HFrEF) heart failure that is Acute. CHF is currently uncontrolled due to Rales/crackles on pulmonary exam and Pulmonary edema/pleural effusion on CXR. Latest ECHO performed and demonstrates- Results for orders placed during the hospital encounter of 02/12/24    Echo    Interpretation Summary    Left Ventricle: The left ventricle is normal in size. Normal wall thickness. Regional wall motion abnormalities present. See diagram for wall motion findings. There is severely reduced systolic function with a visually estimated ejection fraction of 15 - 20%. Ejection fraction by visual approximation is 15%. Unable to assess diastolic function due to atrial fibrillation.    Right Ventricle: Normal right ventricular cavity size. Wall thickness is normal. Right ventricle wall motion  is normal. Systolic function is normal.    Left Atrium: Left atrium is severely dilated.    Right Atrium: Right atrium is mildly dilated.    Aortic Valve: There is moderate aortic valve sclerosis. Mildly  restricted motion.    Mitral Valve: There is moderate to severe regurgitation.    Tricuspid Valve: There is mild regurgitation.    IVC/SVC: Intermediate venous pressure at 8 mmHg.    Pericardium: There is a trivial effusion. Left pleural effusion.  . Continue Beta Blocker, ACE/ARB, and ARNI and monitor clinical status closely. Monitor on telemetry. Patient is on CHF pathway.  Monitor strict Is&Os and daily weights.  Place on fluid restriction of 1.5 L. Cardiology has been consulted. Continue to stress to patient importance of self efficacy and  on diet for CHF. Last BNP reviewed- and noted below   Recent Labs   Lab 02/12/24  0115   BNP 2,754*         Atrial fibrillation  Continue home med lopressor. No therapeutic anticoagulation for now given GIB during previous hospitalization    - 2/13: Developed AFib with RVR and started on amiodarone infusion    Hyperlipidemia, mixed  - Continue home statin    Renal/  Chronic kidney disease-mineral and bone disorder  - Continue home Sevelamer    ESRD (end stage renal disease)  Patient reports making small volumes of urine. Last HD on Saturday     - Nephro consulted for HD needs    Oncology  Anemia in ESRD (end-stage renal disease)  Has anemia of ESRD. Goal Hemoglobin >10    Endocrine  Moderate malnutrition  Nutrition consulted. Most recent weight and BMI monitored-     Measurements:  Wt Readings from Last 1 Encounters:   02/14/24 67.6 kg (149 lb)   Body mass index is 24.79 kg/m².    Patient has been screened and assessed by RD.    Malnutrition Type:  Context: chronic illness  Level: moderate    Malnutrition Characteristic Summary:  Energy Intake (Malnutrition): less than 75% for greater than or equal to 1 month  Muscle Mass (Malnutrition): mild depletion  Fluid Accumulation (Malnutrition): severe    Interventions/Recommendations (treatment strategy):  1. Continue gluten free diet with texture per SLP  2. modify ONS to [facility] renal TID  3. RD to monitor and  follow      Type 2 diabetes mellitus with diabetic polyneuropathy, without long-term current use of insulin  Last A1C 4.9    - LD SSI, add scheduled detemir and aspart if needed    GI  GIB (gastrointestinal bleeding)  - Recent GIB on last admission with GI recommending 8 week course of Protonix s/p EGD with nonbleeding gastric ulcer. PPI end date 02/23/24 and follow up EGD planned at that time. Continue PPI while inpatient.               Critical Care Time: 45 minutes  Critical secondary to Patient has a condition that poses threat to life and bodily function: Severe Respiratory Distress      Critical care was time spent personally by me on the following activities: development of treatment plan with patient or surrogate and bedside caregivers, discussions with consultants, evaluation of patient's response to treatment, examination of patient, ordering and performing treatments and interventions, ordering and review of laboratory studies, ordering and review of radiographic studies, pulse oximetry, re-evaluation of patient's condition. This critical care time did not overlap with that of any other provider or involve time for any procedures.         Meghan Trujillo M.D.  Rapid Response/Critical Care  Department of Pulmonary Medicine  [facility]        N.B.: Portions of this note was dictated using M*Modal Fluency Direct--there may be voice recognition errors occasionally missed on review.

## 2024-02-16 LAB
ALBUMIN SERPL BCP-MCNC: 1.9 G/DL (ref 3.5–5.2)
ALBUMIN SERPL BCP-MCNC: 1.9 G/DL (ref 3.5–5.2)
ALBUMIN SERPL BCP-MCNC: 2 G/DL (ref 3.5–5.2)
ALP SERPL-CCNC: 143 U/L (ref 55–135)
ALT SERPL W/O P-5'-P-CCNC: 161 U/L (ref 10–44)
ANION GAP SERPL CALC-SCNC: 10 MMOL/L (ref 8–16)
ANION GAP SERPL CALC-SCNC: 11 MMOL/L (ref 8–16)
ANION GAP SERPL CALC-SCNC: 13 MMOL/L (ref 8–16)
ANION GAP SERPL CALC-SCNC: 13 MMOL/L (ref 8–16)
ANION GAP SERPL CALC-SCNC: 9 MMOL/L (ref 8–16)
AST SERPL-CCNC: 219 U/L (ref 10–40)
BASOPHILS # BLD AUTO: 0.01 K/UL (ref 0–0.2)
BASOPHILS NFR BLD: 0.1 % (ref 0–1.9)
BILIRUB SERPL-MCNC: 0.5 MG/DL (ref 0.1–1)
BUN SERPL-MCNC: 3 MG/DL (ref 8–23)
BUN SERPL-MCNC: 4 MG/DL (ref 8–23)
BUN SERPL-MCNC: 5 MG/DL (ref 8–23)
CALCIUM SERPL-MCNC: 7 MG/DL (ref 8.7–10.5)
CALCIUM SERPL-MCNC: 7.5 MG/DL (ref 8.7–10.5)
CALCIUM SERPL-MCNC: 7.7 MG/DL (ref 8.7–10.5)
CALCIUM SERPL-MCNC: 7.8 MG/DL (ref 8.7–10.5)
CALCIUM SERPL-MCNC: 7.8 MG/DL (ref 8.7–10.5)
CHLORIDE SERPL-SCNC: 90 MMOL/L (ref 95–110)
CHLORIDE SERPL-SCNC: 96 MMOL/L (ref 95–110)
CHLORIDE SERPL-SCNC: 97 MMOL/L (ref 95–110)
CHOLEST SERPL-MCNC: 125 MG/DL (ref 120–199)
CHOLEST/HDLC SERPL: 3.2 {RATIO} (ref 2–5)
CO2 SERPL-SCNC: 19 MMOL/L (ref 23–29)
CO2 SERPL-SCNC: 19 MMOL/L (ref 23–29)
CO2 SERPL-SCNC: 20 MMOL/L (ref 23–29)
CO2 SERPL-SCNC: 22 MMOL/L (ref 23–29)
CO2 SERPL-SCNC: 22 MMOL/L (ref 23–29)
CREAT SERPL-MCNC: 0.4 MG/DL (ref 0.5–1.4)
CREAT SERPL-MCNC: 0.5 MG/DL (ref 0.5–1.4)
CREAT SERPL-MCNC: 0.5 MG/DL (ref 0.5–1.4)
CREAT SERPL-MCNC: 0.6 MG/DL (ref 0.5–1.4)
CREAT SERPL-MCNC: 0.6 MG/DL (ref 0.5–1.4)
DIFFERENTIAL METHOD BLD: ABNORMAL
EOSINOPHIL # BLD AUTO: 0 K/UL (ref 0–0.5)
EOSINOPHIL NFR BLD: 0.1 % (ref 0–8)
ERYTHROCYTE [DISTWIDTH] IN BLOOD BY AUTOMATED COUNT: 23.2 % (ref 11.5–14.5)
EST. GFR  (NO RACE VARIABLE): >60 ML/MIN/1.73 M^2
GLUCOSE SERPL-MCNC: 102 MG/DL (ref 70–110)
GLUCOSE SERPL-MCNC: 102 MG/DL (ref 70–110)
GLUCOSE SERPL-MCNC: 137 MG/DL (ref 70–110)
GLUCOSE SERPL-MCNC: 145 MG/DL (ref 70–110)
GLUCOSE SERPL-MCNC: 443 MG/DL (ref 70–110)
HCT VFR BLD AUTO: 31.4 % (ref 37–48.5)
HDLC SERPL-MCNC: 39 MG/DL (ref 40–75)
HDLC SERPL: 31.2 % (ref 20–50)
HGB BLD-MCNC: 10.3 G/DL (ref 12–16)
IMM GRANULOCYTES # BLD AUTO: 0.08 K/UL (ref 0–0.04)
IMM GRANULOCYTES NFR BLD AUTO: 0.7 % (ref 0–0.5)
LDLC SERPL CALC-MCNC: 60.4 MG/DL (ref 63–159)
LYMPHOCYTES # BLD AUTO: 1.1 K/UL (ref 1–4.8)
LYMPHOCYTES NFR BLD: 9.5 % (ref 18–48)
MAGNESIUM SERPL-MCNC: 1.6 MG/DL (ref 1.6–2.6)
MAGNESIUM SERPL-MCNC: 1.6 MG/DL (ref 1.6–2.6)
MAGNESIUM SERPL-MCNC: 1.8 MG/DL (ref 1.6–2.6)
MAGNESIUM SERPL-MCNC: 2 MG/DL (ref 1.6–2.6)
MAGNESIUM SERPL-MCNC: 2 MG/DL (ref 1.6–2.6)
MCH RBC QN AUTO: 30.9 PG (ref 27–31)
MCHC RBC AUTO-ENTMCNC: 32.8 G/DL (ref 32–36)
MCV RBC AUTO: 94 FL (ref 82–98)
MONOCYTES # BLD AUTO: 1 K/UL (ref 0.3–1)
MONOCYTES NFR BLD: 8.8 % (ref 4–15)
NEUTROPHILS # BLD AUTO: 9.3 K/UL (ref 1.8–7.7)
NEUTROPHILS NFR BLD: 80.8 % (ref 38–73)
NONHDLC SERPL-MCNC: 86 MG/DL
NRBC BLD-RTO: 1 /100 WBC
OSMOLALITY SERPL: 272 MOSM/KG (ref 275–295)
OSMOLALITY SERPL: 275 MOSM/KG (ref 275–295)
PHOSPHATE SERPL-MCNC: 1.4 MG/DL (ref 2.7–4.5)
PHOSPHATE SERPL-MCNC: 1.4 MG/DL (ref 2.7–4.5)
PHOSPHATE SERPL-MCNC: 1.7 MG/DL (ref 2.7–4.5)
PHOSPHATE SERPL-MCNC: 2.3 MG/DL (ref 2.7–4.5)
PHOSPHATE SERPL-MCNC: <1 MG/DL (ref 2.7–4.5)
PLATELET # BLD AUTO: 230 K/UL (ref 150–450)
PMV BLD AUTO: 11.6 FL (ref 9.2–12.9)
POCT GLUCOSE: 147 MG/DL (ref 70–110)
POCT GLUCOSE: 152 MG/DL (ref 70–110)
POCT GLUCOSE: 164 MG/DL (ref 70–110)
POCT GLUCOSE: 202 MG/DL (ref 70–110)
POTASSIUM SERPL-SCNC: 3.4 MMOL/L (ref 3.5–5.1)
POTASSIUM SERPL-SCNC: 3.8 MMOL/L (ref 3.5–5.1)
POTASSIUM SERPL-SCNC: 4 MMOL/L (ref 3.5–5.1)
POTASSIUM SERPL-SCNC: 4.3 MMOL/L (ref 3.5–5.1)
POTASSIUM SERPL-SCNC: 4.3 MMOL/L (ref 3.5–5.1)
PROT SERPL-MCNC: 6 G/DL (ref 6–8.4)
RBC # BLD AUTO: 3.33 M/UL (ref 4–5.4)
SODIUM SERPL-SCNC: 120 MMOL/L (ref 136–145)
SODIUM SERPL-SCNC: 128 MMOL/L (ref 136–145)
SODIUM SERPL-SCNC: 129 MMOL/L (ref 136–145)
TRIGL SERPL-MCNC: 128 MG/DL (ref 30–150)
VANCOMYCIN SERPL-MCNC: 8.8 UG/ML
WBC # BLD AUTO: 11.49 K/UL (ref 3.9–12.7)

## 2024-02-16 PROCEDURE — 85025 COMPLETE CBC W/AUTO DIFF WBC: CPT | Performed by: INTERNAL MEDICINE

## 2024-02-16 PROCEDURE — 83930 ASSAY OF BLOOD OSMOLALITY: CPT | Performed by: INTERNAL MEDICINE

## 2024-02-16 PROCEDURE — 84100 ASSAY OF PHOSPHORUS: CPT | Performed by: INTERNAL MEDICINE

## 2024-02-16 PROCEDURE — 25000003 PHARM REV CODE 250: Performed by: INTERNAL MEDICINE

## 2024-02-16 PROCEDURE — 90945 DIALYSIS ONE EVALUATION: CPT

## 2024-02-16 PROCEDURE — 80069 RENAL FUNCTION PANEL: CPT | Mod: 91 | Performed by: STUDENT IN AN ORGANIZED HEALTH CARE EDUCATION/TRAINING PROGRAM

## 2024-02-16 PROCEDURE — 94799 UNLISTED PULMONARY SVC/PX: CPT

## 2024-02-16 PROCEDURE — 99900035 HC TECH TIME PER 15 MIN (STAT)

## 2024-02-16 PROCEDURE — 63600175 PHARM REV CODE 636 W HCPCS: Performed by: NURSE PRACTITIONER

## 2024-02-16 PROCEDURE — 63600175 PHARM REV CODE 636 W HCPCS: Performed by: INTERNAL MEDICINE

## 2024-02-16 PROCEDURE — 27100171 HC OXYGEN HIGH FLOW UP TO 24 HOURS

## 2024-02-16 PROCEDURE — 83930 ASSAY OF BLOOD OSMOLALITY: CPT | Mod: 91 | Performed by: INTERNAL MEDICINE

## 2024-02-16 PROCEDURE — 36415 COLL VENOUS BLD VENIPUNCTURE: CPT | Performed by: INTERNAL MEDICINE

## 2024-02-16 PROCEDURE — 83735 ASSAY OF MAGNESIUM: CPT | Mod: 91 | Performed by: STUDENT IN AN ORGANIZED HEALTH CARE EDUCATION/TRAINING PROGRAM

## 2024-02-16 PROCEDURE — 25000003 PHARM REV CODE 250

## 2024-02-16 PROCEDURE — 80061 LIPID PANEL: CPT | Performed by: STUDENT IN AN ORGANIZED HEALTH CARE EDUCATION/TRAINING PROGRAM

## 2024-02-16 PROCEDURE — 99233 SBSQ HOSP IP/OBS HIGH 50: CPT | Mod: ,,, | Performed by: INTERNAL MEDICINE

## 2024-02-16 PROCEDURE — 63600175 PHARM REV CODE 636 W HCPCS

## 2024-02-16 PROCEDURE — 25000003 PHARM REV CODE 250: Performed by: STUDENT IN AN ORGANIZED HEALTH CARE EDUCATION/TRAINING PROGRAM

## 2024-02-16 PROCEDURE — 20000000 HC ICU ROOM

## 2024-02-16 PROCEDURE — 83735 ASSAY OF MAGNESIUM: CPT | Performed by: INTERNAL MEDICINE

## 2024-02-16 PROCEDURE — 80069 RENAL FUNCTION PANEL: CPT | Performed by: INTERNAL MEDICINE

## 2024-02-16 PROCEDURE — 80202 ASSAY OF VANCOMYCIN: CPT | Performed by: INTERNAL MEDICINE

## 2024-02-16 PROCEDURE — 80053 COMPREHEN METABOLIC PANEL: CPT | Performed by: INTERNAL MEDICINE

## 2024-02-16 PROCEDURE — 82803 BLOOD GASES ANY COMBINATION: CPT

## 2024-02-16 PROCEDURE — 94761 N-INVAS EAR/PLS OXIMETRY MLT: CPT | Mod: XB

## 2024-02-16 PROCEDURE — 90945 DIALYSIS ONE EVALUATION: CPT | Mod: ,,, | Performed by: INTERNAL MEDICINE

## 2024-02-16 RX ORDER — HYDROCODONE BITARTRATE AND ACETAMINOPHEN 500; 5 MG/1; MG/1
TABLET ORAL CONTINUOUS
Status: DISCONTINUED | OUTPATIENT
Start: 2024-02-16 | End: 2024-02-17

## 2024-02-16 RX ORDER — MAGNESIUM SULFATE HEPTAHYDRATE 40 MG/ML
2 INJECTION, SOLUTION INTRAVENOUS
Status: DISCONTINUED | OUTPATIENT
Start: 2024-02-16 | End: 2024-02-17

## 2024-02-16 RX ORDER — NOREPINEPHRINE BITARTRATE/D5W 4MG/250ML
0-.2 PLASTIC BAG, INJECTION (ML) INTRAVENOUS CONTINUOUS
Status: DISCONTINUED | OUTPATIENT
Start: 2024-02-16 | End: 2024-02-17

## 2024-02-16 RX ADMIN — PANTOPRAZOLE SODIUM 40 MG: 40 TABLET, DELAYED RELEASE ORAL at 03:02

## 2024-02-16 RX ADMIN — NOREPINEPHRINE BITARTRATE 0.09 MCG/KG/MIN: 4 INJECTION, SOLUTION INTRAVENOUS at 02:02

## 2024-02-16 RX ADMIN — HEPARIN SODIUM 5000 UNITS: 5000 INJECTION INTRAVENOUS; SUBCUTANEOUS at 09:02

## 2024-02-16 RX ADMIN — SODIUM CHLORIDE: 9 INJECTION, SOLUTION INTRAVENOUS at 11:02

## 2024-02-16 RX ADMIN — SODIUM CHLORIDE: 9 INJECTION, SOLUTION INTRAVENOUS at 04:02

## 2024-02-16 RX ADMIN — PANTOPRAZOLE SODIUM 40 MG: 40 TABLET, DELAYED RELEASE ORAL at 06:02

## 2024-02-16 RX ADMIN — PREGABALIN 75 MG: 75 CAPSULE ORAL at 09:02

## 2024-02-16 RX ADMIN — VANCOMYCIN HYDROCHLORIDE 1250 MG: 1.25 INJECTION, POWDER, LYOPHILIZED, FOR SOLUTION INTRAVENOUS at 11:02

## 2024-02-16 RX ADMIN — SODIUM CHLORIDE: 9 INJECTION, SOLUTION INTRAVENOUS at 01:02

## 2024-02-16 RX ADMIN — AMIODARONE HYDROCHLORIDE 400 MG: 200 TABLET ORAL at 08:02

## 2024-02-16 RX ADMIN — INSULIN ASPART 2 UNITS: 100 INJECTION, SOLUTION INTRAVENOUS; SUBCUTANEOUS at 09:02

## 2024-02-16 RX ADMIN — INSULIN ASPART 2 UNITS: 100 INJECTION, SOLUTION INTRAVENOUS; SUBCUTANEOUS at 11:02

## 2024-02-16 RX ADMIN — MUPIROCIN: 20 OINTMENT TOPICAL at 09:02

## 2024-02-16 RX ADMIN — HEPARIN SODIUM 5000 UNITS: 5000 INJECTION INTRAVENOUS; SUBCUTANEOUS at 01:02

## 2024-02-16 RX ADMIN — DRONABINOL 5 MG: 2.5 CAPSULE ORAL at 03:02

## 2024-02-16 RX ADMIN — VENLAFAXINE 37.5 MG: 37.5 TABLET ORAL at 08:02

## 2024-02-16 RX ADMIN — PIPERACILLIN SODIUM AND TAZOBACTAM SODIUM 4.5 G: 4; .5 INJECTION, POWDER, FOR SOLUTION INTRAVENOUS at 05:02

## 2024-02-16 RX ADMIN — DRONABINOL 5 MG: 2.5 CAPSULE ORAL at 06:02

## 2024-02-16 RX ADMIN — ONDANSETRON 4 MG: 2 INJECTION INTRAMUSCULAR; INTRAVENOUS at 05:02

## 2024-02-16 RX ADMIN — HEPARIN SODIUM 5000 UNITS: 5000 INJECTION INTRAVENOUS; SUBCUTANEOUS at 05:02

## 2024-02-16 RX ADMIN — SODIUM PHOSPHATE, MONOBASIC, MONOHYDRATE AND SODIUM PHOSPHATE, DIBASIC, ANHYDROUS 39.99 MMOL: 142; 276 INJECTION, SOLUTION INTRAVENOUS at 05:02

## 2024-02-16 RX ADMIN — ATORVASTATIN CALCIUM 40 MG: 40 TABLET, FILM COATED ORAL at 09:02

## 2024-02-16 RX ADMIN — AMIODARONE HYDROCHLORIDE 400 MG: 200 TABLET ORAL at 09:02

## 2024-02-16 RX ADMIN — SODIUM CHLORIDE: 9 INJECTION, SOLUTION INTRAVENOUS at 09:02

## 2024-02-16 RX ADMIN — MUPIROCIN: 20 OINTMENT TOPICAL at 08:02

## 2024-02-16 RX ADMIN — Medication 324 MG: at 08:02

## 2024-02-16 RX ADMIN — INSULIN ASPART 2 UNITS: 100 INJECTION, SOLUTION INTRAVENOUS; SUBCUTANEOUS at 03:02

## 2024-02-16 RX ADMIN — PIPERACILLIN SODIUM AND TAZOBACTAM SODIUM 4.5 G: 4; .5 INJECTION, POWDER, FOR SOLUTION INTRAVENOUS at 01:02

## 2024-02-16 RX ADMIN — LORAZEPAM 0.5 MG: 0.5 TABLET ORAL at 03:02

## 2024-02-16 NOTE — PROGRESS NOTES
02/16/24 1527   Treatment   Treatment Type SLED   Treatment Status Order change   Dialyzer Time (hours) 19.14   BVP (Liters) 145.9 L   Prescription   Time (Hours) Continuous   Dialysate K + (mEq/L) 4   Dialysate CA + (mEq/L) 2.25   Dialysate HCO3 - (Bicarb) (mEq/L) 30   Dialysate Na + (mEq/L) 135     Orders changed back to SLED from SCUF. Machine settings updated and verified.

## 2024-02-16 NOTE — ASSESSMENT & PLAN NOTE
I have reviewed hospital notes from  MICU service and other specialty providers. I have also reviewed CBC, CMP/BMP,  cultures and imaging with my interpretation as documented.     On admission. CXR today with large left pleural effusion. Now on high flow NC. S/p thoracentesis on 2/15. Fluid gram stain without organisms and culture NGTD.  Continue Zosyn and vancomycin.  Monitor vancomycin trough.  Can likely complete a 5 day course of therapy on 2/17 then discontinue and monitor off antibiotics if cultures remain no growth.   Discussed management plan with the staff and/or members from MICU service.

## 2024-02-16 NOTE — NURSING
"      SICU PLAN OF CARE NOTE    Dx: Acute hypoxemic respiratory failure    Vital Signs: BP (!) 102/55   Pulse 88   Temp 98.3 °F (36.8 °C) (Oral)   Resp (!) 23   Ht 5' 5" (1.651 m)   Wt 67.6 kg (149 lb)   LMP  (LMP Unknown) Comment: BEAU/ NO BSO  1986  SpO2 100%   Breastfeeding No   BMI 24.79 kg/m²     SHIFT EVENTS:  VSS / No acute events this shift.     Neuro: AAO x4, Follows Commands, and Moves All Extremities    Respiratory: HFNC 40L, 40%    Cardiac: NSR    Diet: Diabetic Diet    Gtts: Norepinephrine     Urine Output: Anuric 0 ml/shift    Dialysis: SLED, UF Rate:400/hr    Labs/Accuchecks: Accuchecks q4hr, Renal function/Mag q8hr, daily labs.    Skin: No new skin issues, sacrum with nonblanchable redness,zinc cream applied, wound care to bedside to assess previous right chest site. See wound care orders and flowsheet for further details. Patient turned Q2, Crewe mattress inflated, and bed working correctly.      SPOC reviewed with patient and family; questions and concerns addressed. See flow sheets for full assessment details.   "

## 2024-02-16 NOTE — CONSULTS
[facility] - Surgical Intensive Care  Palliative Medicine  Consult Note    Patient Name: Lily Green  MRN: [id number]  Admission Date: 2/12/2024  Hospital Length of Stay: 3 days  Code Status: Full Code   Attending Provider: Meghan Trujillo MD  Consulting Provider: EMORY Borrego  Primary Care Physician: Pavel Calixto MD  Principal Problem:Acute hypoxemic respiratory failure    Patient information was obtained from patient, spouse/SO, past medical records, and primary team.      Consults  Assessment/Plan:     Palliative Care  Palliative care encounter  Palliative medicine consulted for goals of care care, advanced care planning with focus on resuscitation status as discussed with primary team NP for Mrs. Green a 74 yo lady admitted to critical care with hypoxic respiratory failure with increased work of breathing.  At time of this encounter Mrs. Green is awake and alert, patient is able to communicate verbally and is not participating in conversation at this time. CRRT in progress. No c/o pain,  tapping of right foot on bed noted. Supplemental oxygen in use     Advance Care Planning    Date: 02/15/2024  - No ACP documents received   - ACP education provided   - Patient did not wish or was not able to name a surrogate decision maker or provide an Advance Care Plan.  -  Deric Green is next of kin for medical decisions [phone]   - full code per primary team.      Advance Care Planning    Date: 02/15/2024    GOC  - [facility] benign[facility]LEITE[facility] patient,  and sister Sweetie at bedside.   - Palliative medicine introduced.  Family is amenable to palliative medicine and voluntarily participated in goals of care discussion.    -  reports he and wife have never had any discussions regarding ACP or GOC care.  Sister Sweetie confirms she also has had no previous conversations with patient.    - We specifically addressed what the goals of care would be moving forward, in  "light of the patient's change in clinical status, specifically combination of respiratory failure, renal failure and and new diagnosis of heart failure.    -We We did not specifically address the patient's likely prognosis.   states he is not aware of what the prognosis is.   - We explored the patient's values and preferences for future care especially if the pateint's heart would stop and or she is unable to breath on her own.   states she would want the chance to survive but would not want to "be a vegetable"   - pal med discussed the risks, benefits and patient's survivability with CPR and intubation. Pal med expressed concern for patient in current clinical condition that she would not survive CPR.  - Pal med recommended DNR order and explained what this means.    -  understandably states he is not able to make a decision like this and is hopeful with continue conversation with patient she will be able to direct him.    - Family provided with Hard Choices decision guide and patient and family remain amenable to palliative care.     The family endorses that what is most important right now is to focus on extending life as long as possible, even it it means sacrificing quality and curative/life-prolongation (regardless of treatment burdens)    Accordingly, we have decided that the best plan to meet the patient's goals includes continuing with treatment    Hospice has not been introduced at this time             Symptom Management     Pain   - no c/o pain  - no pain meds given     Dyspnea   - shortness of breath on exertion noted  - supplemental oxygen in use   - oxygen sat 98 - 100%  - patient has fan at bedside     Acute hypoxic respiratory failure/ESRD on CKD/  new diagnosis CHF/ afib with RVR  - as per primary team and specialty consults   - supplemental oxygen in use - see dyspnea above   - bilateral pleural effusion antibiotics continued   - CRRT in progress  - amiodarone drip   - pal med " following for goals of care     Plan  - continue current plan of care  - patient and family would benefit from discussions regarding prognosis in order to develop goals of care   - pal med will continue to follow and provide additional guidance for advanced care planning and completion of acp documents    Primary team, Dr. Trujillo, notified of the above.   Thank you for consult and opportunity to participate in Mrs. Green's care.         Thank you for your consult. I will follow-up with patient. Please contact us if you have any additional questions.    Subjective:     HPI:   HPI obtained from chart review:      As per H&P Mrs. Green is a 74 yo lady with PMH of:  ESRD (TTS), benign essential tremor, Meniere's disease, HTN, HLD, DM2, GERD, gastric ulcer, Celiac's, IBS  and non-smoker. Recently diagnosed with  RML Carcinoid tumor.     Recently admitted to [facility] 1/2/24-2/5/24 (35 days) on the thoracic surgery service  post  right lower lobectomy for resection of her carcinoid tumor and MLND, ultimately requiring a thoracotomy. Post-op, she received a right sided chest tube. 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 Subsequent GIB and discovery of nonbleeding gastric ulcers on EGD made full anticoagulation high risk, so it was stopped. She was ultimately Dc'd to [facility] in [place] where she presents from now.      She presented to [facility] from [facility]  SNF via EMS  with  c/o increased work of breathing, dyspnea, and hypoxia, with oxygen saturation in the 70% on 2L.  Required CPAP in the field.   Placed on BIPAP in the ED. Remained with tachypnea and tachycardia.  Last HD on Saturday.  Reports c/o cough and nausea,  residual urinary retention.  No c/o  fever, chills, pain, chest pain, neck pain. Unable to determine if patient has been taking home meds  Torsemide as ordered.     ED labs significant for BNP of 2754, Trop 0.312. VBG  reveals pH 7.438/38.9/29.   EKG reveals sinus tachycardia.   CXR has evident pleural effusions and possible multifocal pneumonia.     Nephro was consulted and emergent HD completed. 3 liters removed. Admitted to MICU for hypoxic respiratory failure and increased work of breathing.     CTA PE Impression:     1. No convincing evidence of acute pulmonary embolism.  2. Bilateral pleural effusions, pericardial effusion, diffuse body wall edema, and suggested pulmonary vascular congestion/interstitial edema.  Correlation with patient volume status recommended.  3. There remains soft tissue fullness in the lower right paraesophageal soft tissues measures on the order of 24 x 30 mm transaxial dimension, relatively similar to slightly decreased in fullness when compared to the 2024 chest CT. Persistent inflammatory change related to previous identified as azygoesophageal recess gas containing collection identified on the 2024 abdomen and pelvis CT to be considered. Attention on follow-up would be recommended.  4. Additional details, as provided in the body of the report.     Pal med consulted for goals of care and advanced care planning              Hospital Course:  No notes on file    Interval History:     Past Medical History:   Diagnosis Date    Anxiety     Celiac disease 2018    Celiac disease     Depression     Diabetes mellitus     Family history of breast cancer in mother      at age 68    Hyperlipidemia     Hypertension     Meniere disease     Meniere's disease, unspecified ear     Menopause     Peptic ulcer     Reflux esophagitis     Urinary tract infection     Vaginal infection     Vaginal Pap smear 2014    Pap/Hpv Negative        Past Surgical History:   Procedure Laterality Date    APPENDECTOMY      BREAST SURGERY      Tags    CARPAL TUNNEL RELEASE Bilateral 2017    ,     CLOSURE, COLOSTOMY Left 2023    Procedure: CLOSURE, COLOSTOMY;  Surgeon: Manuel Javier MD;   Location: [facility] OR;  Service: General;  Laterality: Left;    COLONOSCOPY  04/2018    Normal  ( Juan A)     COLOSTOMY Right 1/16/2023    Procedure: CREATION, COLOSTOMY;  Surgeon: Manuel Javier MD;  Location: [facility] OR;  Service: General;  Laterality: Right;    DILATION AND CURETTAGE OF UTERUS  1986    DUPUYTREN CONTRACTURE RELEASE Bilateral 09/2017    ESOPHAGOGASTRODUODENOSCOPY N/A 1/24/2024    Procedure: EGD (ESOPHAGOGASTRODUODENOSCOPY);  Surgeon: Yamilet Walters MD;  Location: [facility] ENDO (2ND FLR);  Service: Gastroenterology;  Laterality: N/A;    ESOPHAGOGASTRODUODENOSCOPY N/A 1/26/2024    Procedure: EGD (ESOPHAGOGASTRODUODENOSCOPY);  Surgeon: Yamilet Walters MD;  Location: [facility] ENDO (2ND FLR);  Service: Gastroenterology;  Laterality: N/A;    HYSTERECTOMY  1987    BEAU w/ appy, no BSO     IMPLANTATION OF LEADLESS PACEMAKER N/A 1/16/2024    Procedure: INSERTION, CARDIAC PACEMAKER, LEADLESS;  Surgeon: LENIN Frances MD;  Location: [facility] EP LAB;  Service: Cardiology;  Laterality: N/A;  SB, LEADLESS PPM (MICRA), MDT, ANES, EH, CVICU [id number]    INJECTION OF NEUROLYTIC AGENT AROUND LUMBAR SYMPATHETIC NERVE N/A 1/6/2022    Procedure: BLOCK, LUMBAR SYMPATHETIC;  Surgeon: Souleymane Rizo Jr., MD;  Location: [facility] PAIN MGT;  Service: Pain Management;  Laterality: N/A;  no pacemaker   pt is diabetic     INJECTION OF NEUROLYTIC AGENT AROUND LUMBAR SYMPATHETIC NERVE N/A 8/25/2022    Procedure: BLOCK, LUMBAR SYMPATHETIC;  Surgeon: Souleymane Rizo Jr., MD;  Location: [facility] PAIN MGT;  Service: Pain Management;  Laterality: N/A;  diabetic     INSERTION OF TUNNELED CENTRAL VENOUS HEMODIALYSIS CATHETER Right 1/25/2023    Procedure: INSERTION, CATHETER, HEMODIALYSIS, DUAL LUMEN;  Surgeon: Manuel Javier MD;  Location: [facility] OR;  Service: General;  Laterality: Right;    INSERTION, CATHETER, TUNNELED Left 1/28/2023    Procedure: Insertion,catheter,tunneled;  Surgeon: Watson Fontenot MD;  Location: [facility] OR;  Service:  General;  Laterality: Left;    LAPAROTOMY, EXPLORATORY N/A 1/14/2023    Procedure: LAPAROTOMY, EXPLORATORY;  Surgeon: Manuel Javier MD;  Location: [facility] OR;  Service: General;  Laterality: N/A;    LAPAROTOMY, EXPLORATORY N/A 1/16/2023    Procedure: LAPAROTOMY, EXPLORATORY;  Surgeon: Manuel Javier MD;  Location: [facility] OR;  Service: General;  Laterality: N/A;    LYMPHADENECTOMY Right 1/2/2024    Procedure: LYMPHADENECTOMY;  Surgeon: Tan Thomson MD;  [facility];  Service: Cardiothoracic;  Laterality: Right;    PACEMAKER, TEMPORARY, TRANSVENOUS, PEDIATRIC Right 1/12/2024    Procedure: Pacemaker, Temporary, Transvenous;  Surgeon: Todd Carlisle MD;  Location: [facility] CATH LAB;  Service: Cardiology;  Laterality: Right;    REMOVAL OF CATHETER Right 1/28/2023    Procedure: REMOVAL, CATHETER;  Surgeon: Watson Fontenot MD;  Location: [facility] OR;  Service: General;  Laterality: Right;    REMOVAL, TUNNELED CATH Right 1/25/2023    Procedure: REMOVAL, TUNNELED CATH;  Surgeon: Manuel Javier MD;  Location: [facility] OR;  Service: General;  Laterality: Right;    ROBOTIC BRONCHOSCOPY N/A 10/20/2023    Procedure: ROBOTIC BRONCHOSCOPY;  Surgeon: Mayda Monzon MD;  [facility];  Service: Pulmonary;  Laterality: N/A;    SHOULDER SURGERY  2009 & 2010    right rotator cuff    THORACOTOMY Right 1/2/2024    Procedure: THORACOTOMY;  Surgeon: Tan Thomson MD;  Location: [facility] OR [facility] FLR;  Service: Cardiothoracic;  Laterality: Right;    THORACOTOMY, WITH INITIAL THERAPEUTIC WEDGE RESECTION OF LUNG Right 1/2/2024    Procedure: THORACOTOMY, WITH INITIAL THERAPEUTIC WEDGE RESECTION OF LUNG;  Surgeon: Tan Thomson MD;  [facility];  Service: Cardiothoracic;  Laterality: Right;    TONSILLECTOMY      UPPER GASTROINTESTINAL ENDOSCOPY  04/2018       Review of patient's allergies indicates:   Allergen Reactions    Crestor [rosuvastatin] Swelling    Gluten protein         Medications:  Continuous Infusions:   sodium chloride 0.9%      sodium chloride 0.9% Stopped (02/13/24 0126)     Scheduled Meds:   sodium chloride 0.9%   Intravenous Once    amiodarone  400 mg Oral BID    Followed by    [START ON 2/25/2024] amiodarone  200 mg Oral Daily    atorvastatin  40 mg Oral QHS    droNABinol  5 mg Oral BID AC    ferrous gluconate  324 mg Oral Daily with breakfast    heparin (porcine)  5,000 Units Subcutaneous Q8H    metoprolol tartrate  25 mg Oral BID    mupirocin   Nasal BID    pantoprazole  40 mg Oral BID AC    piperacillin-tazobactam (Zosyn) IV (PEDS and ADULTS) (extended infusion is not appropriate)  4.5 g Intravenous Q8H    [START ON 2/16/2024] pregabalin  75 mg Oral Every other day    venlafaxine  37.5 mg Oral Daily     PRN Meds:acetaminophen, albuterol-ipratropium, dextrose 10%, dextrose 10%, glucagon (human recombinant), glucose, glucose, insulin aspart U-100, magnesium sulfate IVPB, melatonin, ondansetron, sodium chloride 0.9%, Pharmacy to dose Vancomycin consult **AND** vancomycin - pharmacy to dose    Family History       Problem Relation (Age of Onset)    Arthritis Father    Breast cancer Mother, Paternal Aunt    Cancer Mother, Paternal Aunt    Diabetes Paternal Grandfather    Hyperlipidemia Sister    Vision loss Father, Mother, Sister          Tobacco Use    Smoking status: Never    Smokeless tobacco: Never   Substance and Sexual Activity    Alcohol use: No    Drug use: Never    Sexual activity: Not Currently     Partners: Male     Birth control/protection: See Surgical Hx     Comment: :        Review of Systems   Respiratory:  Positive for shortness of breath.    Genitourinary:         Urinary retention    Neurological:  Positive for weakness.     Objective:     Vital Signs (Most Recent):  Temp: 98.6 °F (37 °C) (02/15/24 1200)  Pulse: 91 (02/15/24 1800)  Resp: (!) 47 (02/15/24 1800)  BP: (!) 100/58 (02/15/24 1800)  SpO2: (!) 94 % (02/15/24 1800) Vital Signs (24h  Range):  Temp:  [96.6 °F (35.9 °C)-98.6 °F (37 °C)] 98.6 °F (37 °C)  Pulse:  [61-93] 91  Resp:  [13-75] 47  SpO2:  [88 %-100 %] 94 %  BP: ()/(51-77) 100/58     Weight: 67.6 kg (149 lb)  Body mass index is 24.79 kg/m².       Physical Exam  Vitals and nursing note reviewed.   Constitutional:       Appearance: She is ill-appearing.   Cardiovascular:      Rate and Rhythm: Tachycardia present.   Pulmonary:      Comments: Dyspnea, supplemental oxygen   Abdominal:      General: There is no distension.   Genitourinary:     Comments: CRRT in progress   Skin:     General: Skin is warm.   Neurological:      Mental Status: She is alert.       Review of Symptoms      Symptom Assessment (ESAS 0-10 Scale)  Unable to complete assessment due to Acuity of condition         Pain Assessment in Advanced Demential Scale (PAINAD)   Breathing - Independent of vocalization:  0  Negative vocalization:  0  Facial expression:  1  Body language:  1  Consolability:  0  Total:  2    Performance Status:  60    Living Arrangements:  Lives with spouse    Psychosocial/Cultural:   See Palliative Psychosocial Note: Yes  **Primary  to Follow**  Palliative Care  Consult: Yes    Spiritual:  F - Deepika and Belief:  Gnosticism   A - Address in Care:   following       Advance Care Planning  Advance Directives:   Living Will: No    LaPOST: No    Do Not Resuscitate Status: No    Medical Power of : No      Decision Making:  Family answered questions and Patient unable to communicate due to disease severity/cognitive impairment  Goals of Care: The patient and family endorses that what is most important right now is to focus on extending life as long as possible, even it it means sacrificing quality and curative/life-prolongation (regardless of treatment burdens)    Accordingly, we have decided that the best plan to meet the patient's goals includes continuing with treatment         Significant Labs: All pertinent  labs within the past 24 hours have been reviewed.  CBC:   Recent Labs   Lab 02/15/24  0238   WBC 9.58   HGB 8.9*   HCT 28.8*   MCV 97        BMP:  Recent Labs   Lab 02/15/24  1516   *   *   K 4.5   CL 99   CO2 21*   BUN 6*   CREATININE 0.7   CALCIUM 7.4*   MG 1.8     LFT:  Lab Results   Component Value Date    AST 53 (H) 02/15/2024    ALKPHOS 108 02/15/2024    BILITOT 0.3 02/15/2024     Albumin:   Albumin   Date Value Ref Range Status   02/15/2024 2.1 (L) 3.5 - 5.2 g/dL Final     Protein:   Total Protein   Date Value Ref Range Status   02/15/2024 6.1 6.0 - 8.4 g/dL Final     Lactic acid:   Lab Results   Component Value Date    LACTATE 1.8 02/12/2024    LACTATE 1.6 01/05/2024       Significant Imaging: I have reviewed all pertinent imaging results/findings within the past 24 hours.       30 additional mins spent in advanced care planning     Dona Hughes, CNS  Palliative Medicine  [facility] - Surgical Intensive Care

## 2024-02-16 NOTE — SUBJECTIVE & OBJECTIVE
Interval History:     Past Medical History:   Diagnosis Date    Anxiety     Celiac disease 2018    Celiac disease     Depression     Diabetes mellitus     Family history of breast cancer in mother      at age 68    Hyperlipidemia     Hypertension     Meniere disease     Meniere's disease, unspecified ear     Menopause     Peptic ulcer     Reflux esophagitis     Urinary tract infection     Vaginal infection     Vaginal Pap smear 2014    Pap/Hpv Negative        Past Surgical History:   Procedure Laterality Date    APPENDECTOMY      BREAST SURGERY      Tags    CARPAL TUNNEL RELEASE Bilateral 2017    ,     CLOSURE, COLOSTOMY Left 2023    Procedure: CLOSURE, COLOSTOMY;  Surgeon: Manuel Javier MD;  Location: [facility] OR;  Service: General;  Laterality: Left;    COLONOSCOPY  2018    Normal  ([place])     COLOSTOMY Right 2023    Procedure: CREATION, COLOSTOMY;  Surgeon: Manuel Javier MD;  Location: [facility] OR;  Service: General;  Laterality: Right;    DILATION AND CURETTAGE OF UTERUS  1986    DUPUYTREN CONTRACTURE RELEASE Bilateral 2017    ESOPHAGOGASTRODUODENOSCOPY N/A 2024    Procedure: EGD (ESOPHAGOGASTRODUODENOSCOPY);  Surgeon: Yamilet Walters MD;  Location: [facility] ([address] [place] [address]);  Service: Gastroenterology;  Laterality: N/A;    ESOPHAGOGASTRODUODENOSCOPY N/A 2024    Procedure: EGD (ESOPHAGOGASTRODUODENOSCOPY);  Surgeon: Yamilet Walters MD;  Location: [address]);  Service: Gastroenterology;  Laterality: N/A;    HYSTERECTOMY      BEAU w/ appy, no BSO     IMPLANTATION OF LEADLESS PACEMAKER N/A 2024    Procedure: INSERTION, CARDIAC PACEMAKER, LEADLESS;  Surgeon: LENIN Frances MD;  Location: [facility] EP LAB;  Service: Cardiology;  Laterality: N/A;  SB, LEADLESS PPM (MICRA), MDT, ANES, EH, CVICU [id number]    INJECTION OF NEUROLYTIC AGENT AROUND LUMBAR SYMPATHETIC NERVE N/A 2022    Procedure: BLOCK, LUMBAR SYMPATHETIC;   Surgeon: Souleymane Rizo Jr., MD;  Location: [facility] PAIN MGT;  Service: Pain Management;  Laterality: N/A;  no pacemaker   pt is diabetic     INJECTION OF NEUROLYTIC AGENT AROUND LUMBAR SYMPATHETIC NERVE N/A 8/25/2022    Procedure: BLOCK, LUMBAR SYMPATHETIC;  Surgeon: Souleymane Rizo Jr., MD;  Location: [facility] PAIN MGT;  Service: Pain Management;  Laterality: N/A;  diabetic     INSERTION OF TUNNELED CENTRAL VENOUS HEMODIALYSIS CATHETER Right 1/25/2023    Procedure: INSERTION, CATHETER, HEMODIALYSIS, DUAL LUMEN;  Surgeon: Manuel Javier MD;  Location: [facility] OR;  Service: General;  Laterality: Right;    INSERTION, CATHETER, TUNNELED Left 1/28/2023    Procedure: Insertion,catheter,tunneled;  Surgeon: Watson Fontenot MD;  Location: [facility] OR;  Service: General;  Laterality: Left;    LAPAROTOMY, EXPLORATORY N/A 1/14/2023    Procedure: LAPAROTOMY, EXPLORATORY;  Surgeon: Manuel Javier MD;  Location: [facility] OR;  Service: General;  Laterality: N/A;    LAPAROTOMY, EXPLORATORY N/A 1/16/2023    Procedure: LAPAROTOMY, EXPLORATORY;  Surgeon: Manuel Javier MD;  Location: [facility] OR;  Service: General;  Laterality: N/A;    LYMPHADENECTOMY Right 1/2/2024    Procedure: LYMPHADENECTOMY;  Surgeon: Tan Thomson MD;  Location: [address];  Service: Cardiothoracic;  Laterality: Right;    PACEMAKER, TEMPORARY, TRANSVENOUS, PEDIATRIC Right 1/12/2024    Procedure: Pacemaker, Temporary, Transvenous;  Surgeon: Todd Carlisle MD;  Location: [facility] CATH LAB;  Service: Cardiology;  Laterality: Right;    REMOVAL OF CATHETER Right 1/28/2023    Procedure: REMOVAL, CATHETER;  Surgeon: Watson Fontenot MD;  Location: [facility] OR;  Service: General;  Laterality: Right;    REMOVAL, TUNNELED CATH Right 1/25/2023    Procedure: REMOVAL, TUNNELED CATH;  Surgeon: Manuel Javier MD;  Location: [facility] OR;  Service: General;  Laterality: Right;    ROBOTIC BRONCHOSCOPY N/A 10/20/2023    Procedure: ROBOTIC BRONCHOSCOPY;  Surgeon:  Mayda Monzon MD;  Location: [address];  Service: Pulmonary;  Laterality: N/A;    SHOULDER SURGERY  2009 & 2010    right rotator cuff    THORACOTOMY Right 1/2/2024    Procedure: THORACOTOMY;  Surgeon: Tan Thomson MD;  Location: [address];  Service: Cardiothoracic;  Laterality: Right;    THORACOTOMY, WITH INITIAL THERAPEUTIC WEDGE RESECTION OF LUNG Right 1/2/2024    Procedure: THORACOTOMY, WITH INITIAL THERAPEUTIC WEDGE RESECTION OF LUNG;  Surgeon: Tan Thomson MD;  Location: [address];  Service: Cardiothoracic;  Laterality: Right;    TONSILLECTOMY      UPPER GASTROINTESTINAL ENDOSCOPY  04/2018       Review of patient's allergies indicates:   Allergen Reactions    Crestor [rosuvastatin] Swelling    Gluten protein        Medications:  Continuous Infusions:   sodium chloride 0.9%      sodium chloride 0.9% Stopped (02/13/24 0126)     Scheduled Meds:   sodium chloride 0.9%   Intravenous Once    amiodarone  400 mg Oral BID    Followed by    [START ON 2/25/2024] amiodarone  200 mg Oral Daily    atorvastatin  40 mg Oral QHS    droNABinol  5 mg Oral BID AC    ferrous gluconate  324 mg Oral Daily with breakfast    heparin (porcine)  5,000 Units Subcutaneous Q8H    metoprolol tartrate  25 mg Oral BID    mupirocin   Nasal BID    pantoprazole  40 mg Oral BID AC    piperacillin-tazobactam (Zosyn) IV (PEDS and ADULTS) (extended infusion is not appropriate)  4.5 g Intravenous Q8H    [START ON 2/16/2024] pregabalin  75 mg Oral Every other day    venlafaxine  37.5 mg Oral Daily     PRN Meds:acetaminophen, albuterol-ipratropium, dextrose 10%, dextrose 10%, glucagon (human recombinant), glucose, glucose, insulin aspart U-100, magnesium sulfate IVPB, melatonin, ondansetron, sodium chloride 0.9%, Pharmacy to dose Vancomycin consult **AND** vancomycin - pharmacy to dose    Family History       Problem Relation (Age of Onset)    Arthritis Father    Breast cancer Mother, Paternal Aunt     Cancer Mother, Paternal Aunt    Diabetes Paternal Grandfather    Hyperlipidemia Sister    Vision loss Father, Mother, Sister          Tobacco Use    Smoking status: Never    Smokeless tobacco: Never   Substance and Sexual Activity    Alcohol use: No    Drug use: Never    Sexual activity: Not Currently     Partners: Male     Birth control/protection: See Surgical Hx     Comment: :        Review of Systems   Respiratory:  Positive for shortness of breath.    Genitourinary:         Urinary retention    Neurological:  Positive for weakness.     Objective:     Vital Signs (Most Recent):  Temp: 98.6 °F (37 °C) (02/15/24 1200)  Pulse: 91 (02/15/24 1800)  Resp: (!) 47 (02/15/24 1800)  BP: (!) 100/58 (02/15/24 1800)  SpO2: (!) 94 % (02/15/24 1800) Vital Signs (24h Range):  Temp:  [96.6 °F (35.9 °C)-98.6 °F (37 °C)] 98.6 °F (37 °C)  Pulse:  [61-93] 91  Resp:  [13-75] 47  SpO2:  [88 %-100 %] 94 %  BP: ()/(51-77) 100/58     Weight: 67.6 kg (149 lb)  Body mass index is 24.79 kg/m².       Physical Exam  Vitals and nursing note reviewed.   Constitutional:       Appearance: She is ill-appearing.   Cardiovascular:      Rate and Rhythm: Tachycardia present.   Pulmonary:      Comments: Dyspnea, supplemental oxygen   Abdominal:      General: There is no distension.   Genitourinary:     Comments: CRRT in progress   Skin:     General: Skin is warm.   Neurological:      Mental Status: She is alert.        Review of Symptoms      Symptom Assessment (ESAS 0-10 Scale)  Unable to complete assessment due to Acuity of condition         Pain Assessment in Advanced Demential Scale (PAINAD)   Breathing - Independent of vocalization:  0  Negative vocalization:  0  Facial expression:  1  Body language:  1  Consolability:  0  Total:  2    Performance Status:  60    Living Arrangements:  Lives with spouse    Psychosocial/Cultural:   See Palliative Psychosocial Note: Yes  **Primary  to Follow**  Palliative Care Social  Worker Consult: Yes    Spiritual:  F - Deepika and Belief:  Restoration   A - Address in Care:   following       Advance Care Planning   Advance Directives:   Living Will: No    LaPOST: No    Do Not Resuscitate Status: No    Medical Power of : No      Decision Making:  Family answered questions and Patient unable to communicate due to disease severity/cognitive impairment  Goals of Care: The patient and family endorses that what is most important right now is to focus on extending life as long as possible, even it it means sacrificing quality and curative/life-prolongation (regardless of treatment burdens)    Accordingly, we have decided that the best plan to meet the patient's goals includes continuing with treatment         Significant Labs: All pertinent labs within the past 24 hours have been reviewed.  CBC:   Recent Labs   Lab 02/15/24  0238   WBC 9.58   HGB 8.9*   HCT 28.8*   MCV 97        BMP:  Recent Labs   Lab 02/15/24  1516   *   *   K 4.5   CL 99   CO2 21*   BUN 6*   CREATININE 0.7   CALCIUM 7.4*   MG 1.8     LFT:  Lab Results   Component Value Date    AST 53 (H) 02/15/2024    ALKPHOS 108 02/15/2024    BILITOT 0.3 02/15/2024     Albumin:   Albumin   Date Value Ref Range Status   02/15/2024 2.1 (L) 3.5 - 5.2 g/dL Final     Protein:   Total Protein   Date Value Ref Range Status   02/15/2024 6.1 6.0 - 8.4 g/dL Final     Lactic acid:   Lab Results   Component Value Date    LACTATE 1.8 02/12/2024    LACTATE 1.6 01/05/2024       Significant Imaging: I have reviewed all pertinent imaging results/findings within the past 24 hours.

## 2024-02-16 NOTE — PROGRESS NOTES
02/16/24 1254   Treatment   Treatment Type SCUF   Treatment Status Order change   Dialysis Machine Number [id number]   Dialyzer Time (hours) 16.42   BVP (Liters) 145.9 L   Prescription   Dialysate Flow Rate (mL/min) 0(seq)     Orders changed to continuous SCUF. Machine settings updated and verified.

## 2024-02-16 NOTE — SUBJECTIVE & OBJECTIVE
Interval History: ". 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  She was on meropenem, then transitioned to ceftriaxone and metronidazole.  She was ultimately discharged on cefpodoxime and metronidazole with a plan to complete 4 weeks of antibiotics on February 15.  She is now re-admitted with respiratory distress.  ID consulted to assist with her care.    Small amount of fluid and gas with associated inflammatory changes in the azygoesophageal recess.  Per discussions with CT surgery and IR, no invasive procedures can be performed for this.  CXR on 2/15 with large left pleural effusion. Receiving CRRT and s/p thoracentesis on 2/15.    Review of Systems   Unable to perform ROS: Severe respiratory distress     Objective:     Vital Signs (Most Recent):  Temp: 98 °F (36.7 °C) (02/16/24 0701)  Pulse: 88 (02/16/24 1100)  Resp: (!) 36 (02/16/24 1100)  BP: (!) 89/63 (02/16/24 1100)  SpO2: 100 % (02/16/24 1100) Vital Signs (24h Range):  Temp:  [98 °F (36.7 °C)-98.8 °F (37.1 °C)] 98 °F (36.7 °C)  Pulse:  [81-93] 88  Resp:  [20-53] 36  SpO2:  [88 %-100 %] 100 %  BP: ()/(39-73) 89/63     Weight: 67.6 kg (149 lb)  Body mass index is 24.79 kg/m².    Estimated Creatinine Clearance: 90.2 mL/min (based on SCr of 0.5 mg/dL).     Physical Exam  Vitals and nursing note reviewed.   Constitutional:       Appearance: She is well-developed.   HENT:      Head: Normocephalic and atraumatic.      Right Ear: External ear normal.      Left Ear: External ear normal.   Eyes:      General: No scleral icterus.        Right eye: No discharge.         Left eye: No discharge.      Conjunctiva/sclera: Conjunctivae normal.   Cardiovascular:      Rate and Rhythm: Normal rate and regular rhythm.      Heart sounds: Normal heart sounds. No murmur heard.     No friction rub. No  gallop.   Pulmonary:      Effort: Pulmonary effort is normal. Tachypnea present. No respiratory distress.      Breath sounds: No stridor. Examination of the left-middle field reveals rales. Examination of the left-lower field reveals decreased breath sounds. Decreased breath sounds and rales present. No wheezing.   Abdominal:      General: Bowel sounds are normal.      Palpations: Abdomen is soft.   Musculoskeletal:         General: No tenderness.      Right lower leg: No edema.      Left lower leg: No edema.   Skin:     General: Skin is warm and dry.   Neurological:      Mental Status: She is lethargic.          Significant Labs: BMP:   Recent Labs   Lab 02/16/24 0418     102   *  128*   K 4.3  4.3   CL 96  96   CO2 19*  19*   BUN 3*  3*   CREATININE 0.5  0.5   CALCIUM 7.8*  7.8*   MG 2.0  2.0     CBC:   Recent Labs   Lab 02/15/24  0238 02/16/24 0418   WBC 9.58 11.49   HGB 8.9* 10.3*   HCT 28.8* 31.4*    230     Microbiology Results (last 7 days)       Procedure Component Value Units Date/Time    Culture, Anaerobe [[id number]] Collected: 02/15/24 1522    Order Status: Completed Specimen: Pleural Fluid Updated: 02/16/24 0954     Anaerobic Culture Culture in progress    Blood culture [[id number]] Collected: 02/12/24 0526    Order Status: Completed Specimen: Blood from Peripheral, Antecubital, Right Updated: 02/16/24 0812     Blood Culture, Routine No Growth to date      No Growth to date      No Growth to date      No Growth to date      No Growth to date    Blood culture [[id number]] Collected: 02/12/24 0526    Order Status: Completed Specimen: Blood from Peripheral, Upper Arm, Right Updated: 02/16/24 0812     Blood Culture, Routine No Growth to date      No Growth to date      No Growth to date      No Growth to date      No Growth to date    Aerobic culture [[id number]] Collected: 02/15/24 1522    Order Status: Completed Specimen: Pleural Fluid Updated: 02/16/24 0726     Aerobic  Bacterial Culture No growth    Gram stain [[id number]] Collected: 02/15/24 1522    Order Status: Completed Specimen: Pleural Fluid Updated: 02/15/24 1939     Gram Stain Result Rare WBC's      No organisms seen    KOH prep [[id number]] Collected: 02/15/24 1522    Order Status: Completed Specimen: Pleural Fluid Updated: 02/15/24 1843     KOH Prep No yeast or fungal elements seen    Fungus culture [[id number]] Collected: 02/15/24 1522    Order Status: Sent Specimen: Pleural Fluid Updated: 02/15/24 1551    AFB Culture & Smear [[id number]] Collected: 02/15/24 1522    Order Status: Sent Specimen: Pleural Fluid Updated: 02/15/24 1523            Significant Imaging: I have reviewed all pertinent imaging results/findings within the past 24 hours.

## 2024-02-16 NOTE — ASSESSMENT & PLAN NOTE
"Palliative medicine consulted for goals of care care, advanced care planning with focus on resuscitation status as discussed with primary team NP for Mrs. Green a 74 yo lady admitted to critical care with hypoxic respiratory failure with increased work of breathing.  At time of this encounter Mrs. Green is awake and alert, patient is able to communicate verbally and is not participating in conversation at this time. CRRT in progress. No c/o pain,  tapping of right foot on bed noted. Supplemental oxygen in use     Advance Care Planning     Date: 02/15/2024  - No ACP documents received   - ACP education provided   - Patient did not wish or was not able to name a surrogate decision maker or provide an Advance Care Plan.  -  Deric Green is next of kin for medical decisions [phone]   - full code per primary team.      Advance Care Planning     Date: 02/15/2024    GOC  - Pal med MAIK and MARILYN Alves LCSW met with patient,  and sister Sweetie at bedside.   - Palliative medicine introduced.  Family is amenable to palliative medicine and voluntarily participated in goals of care discussion.    -  reports he and wife have never had any discussions regarding ACP or GOC care.  Sister Sweetie confirms she also has had no previous conversations with patient.    - We specifically addressed what the goals of care would be moving forward, in light of the patient's change in clinical status, specifically combination of respiratory failure, renal failure and and new diagnosis of heart failure.    -We We did not specifically address the patient's likely prognosis.   states he is not aware of what the prognosis is.   - We explored the patient's values and preferences for future care especially if the pateint's heart would stop and or she is unable to breath on her own.   states she would want the chance to survive but would not want to "be a vegetable"   - pal med discussed the risks, benefits and " patient's survivability with CPR and intubation. Pal med expressed concern for patient in current clinical condition that she would not survive CPR.  - Pal med recommended DNR order and explained what this means.    -  understandably states he is not able to make a decision like this and is hopeful with continue conversation with patient she will be able to direct him.    - Family provided with [facility] Cho[employer] decision guide and patient and family remain amenable to palliative care.     The family endorses that what is most important right now is to focus on extending life as long as possible, even it it means sacrificing quality and curative/life-prolongation (regardless of treatment burdens)    Accordingly, we have decided that the best plan to meet the patient's goals includes continuing with treatment    Hospice has not been introduced at this time             Symptom Management     Pain   - no c/o pain  - no pain meds given     Dyspnea   - shortness of breath on exertion noted  - supplemental oxygen in use   - oxygen sat 98 - 100%  - patient has fan at bedside     Acute hypoxic respiratory failure/ESRD on CKD/  new diagnosis CHF/ afib with RVR  - as per primary team and specialty consults   - supplemental oxygen in use - see dyspnea above   - bilateral pleural effusion antibiotics continued   - CRRT in progress  - amiodarone drip   - pal med following for goals of care     Plan  - continue current plan of care  - patient and family would benefit from discussions regarding prognosis in order to develop goals of care   - pal med will continue to follow and provide additional guidance for advanced care planning and completion of acp documents    Primary team, Dr. Trujillo, notified of the above.   Thank you for consult and opportunity to participate in Mrs. Green's care.

## 2024-02-16 NOTE — PROGRESS NOTES
I personally saw and examined the patient on SLED which he is currently on for the removal of uremic toxins and volume control.  The patient is tolerating the treatment, see SLED flow sheet for vitals and assessemts. I also reviewed the chart, flow sheets and current medication.   The UFR, BFR, DFR and dialysis bath was adjusted accordingly.     Patient s/p thoracentesis yd, net UF 2.6 liters with SLED over night. Will continue to remove volume, change to SCUF now. Patient remains to have pulmonary edema on CXR.

## 2024-02-16 NOTE — CARE UPDATE
Patient noted to have worsened hyponatremia Na 128-->120; Glucose ~443; correct Na is 125 still lower than prior despite aggressive volume removal. (patient almost Negative ~1L since this AM; ~3.6L in last 24hrs.)    Will switch to SLED with Na bath of 134  Continue 400UF rate to achieve negative volume balance.   Repeat RFP in 4hrs.

## 2024-02-16 NOTE — HPI
HPI obtained from chart review:      As per H&P Mrs. Green is a 72 yo lady with PMH of:  ESRD (TTS), benign essential tremor, Meniere's disease, HTN, HLD, DM2, GERD, gastric ulcer, Celiac's, IBS  and non-smoker. Recently diagnosed with  RML Carcinoid tumor.     Recently admitted to [facility] 1/2/24-2/5/24 (35 days) on the thoracic surgery service  post  right lower lobectomy for resection of her carcinoid tumor and MLND, ultimately requiring a thoracotomy. Post-op, she received a right sided chest tube. 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 Subsequent GIB and discovery of nonbleeding gastric ulcers on EGD made full anticoagulation high risk, so it was stopped. She was ultimately Dc'd to [facility] in [place] where she presents from now.      She presented to [facility] from [facility]  SNF via EMS  with  c/o increased work of breathing, dyspnea, and hypoxia, with oxygen saturation in the 70% on 2L.  Required CPAP in the field.   Placed on BIPAP in the ED. Remained with tachypnea and tachycardia.  Last HD on Saturday.  Reports c/o cough and nausea,  residual urinary retention.  No c/o  fever, chills, pain, chest pain, neck pain. Unable to determine if patient has been taking home meds  Torsemide as ordered.     ED labs significant for BNP of 2754, Trop 0.312. VBG reveals pH 7.438/38.9/29.   EKG reveals sinus tachycardia.   CXR has evident pleural effusions and possible multifocal pneumonia.     Nephro was consulted and emergent HD completed. 3 liters removed. Admitted to MICU for hypoxic respiratory failure and increased work of breathing.     CTA PE Impression:     1. No convincing evidence of acute pulmonary embolism.  2. Bilateral pleural effusions, pericardial effusion, diffuse body wall edema, and suggested pulmonary vascular congestion/interstitial edema.  Correlation with patient volume status recommended.  3. There remains soft  tissue fullness in the lower right paraesophageal soft tissues measures on the order of 24 x 30 mm transaxial dimension, relatively similar to slightly decreased in fullness when compared to the 01/13/2024 chest CT. Persistent inflammatory change related to previous identified as azygoesophageal recess gas containing collection identified on the 01/08/2024 abdomen and pelvis CT to be considered. Attention on follow-up would be recommended.  4. Additional details, as provided in the body of the report.     Pal med consulted for goals of care and advanced care planning

## 2024-02-16 NOTE — PROGRESS NOTES
[facility]  Critical Care Medicine  Progress Note    Patient Name: Lily Green  MRN: [id number]  Admission Date: 2/12/2024  Hospital Length of Stay: 4 days  Code Status: Full Code  Attending Provider: Meghan Trujillo MD  Primary Care Provider: Pavel Calixto MD   Principal Problem: Acute hypoxemic respiratory failure    Subjective:     HPI:  Ms. Green is a 73 y.o. 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 She was last admitted from 1/2/24-2/5/24 (35 days) on the thoracic surgery service during which she underwent a right lower lobectomy for resection of her carcinoid tumor and MLND, ultimately requiring a thoracotomy. Post-op, she received a right sided chest tube. 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 Subsequent GIB and discovery of nonbleeding gastric ulcers on EGD made full anticoagulation high risk, so it was stopped. She was ultimately Dc'd to [facility] in [place] where she presents from now.     She presents today from SNF with increased work of breathing, dyspnea, and hypoxia, with oxygen saturation in the 70% on 2L. She became acutely short of breath today and was brought in by EMS after being put on CPAP in the field with some improvement. Placed on BIPAP in the ED. She continues to be tachycardic and tachypnic. She last completed a full session of HD on Saturday. She reports cough and nausea, but denies fever, chills, pain, chest pain, neck pain. She has little residual renal function . Unclear if she has been taking her Torsemide listed as her home med. Her workup was significant for BNP of 2754, Trop 0.312. VBG reveals pH 7.438/38.9/29. EKG reveals sinus tachycardia. CXR has evident pleural effusions and possible multifocal pneumonia. Nephro was consulted  and emergent HD orders were placed with goal of removing 3 L urgently. She will be admitted to MICU for hypoxic respiratory failure and increased work of breathing. CTA PE study is still pending at time of evaluation since patient is getting HD.     Hospital/ICU Course:  2/12:  Patient admitted for hypoxic respiratory failure.  Echocardiogram revealed new low LVEF with elevated troponins.  Cardiology consulted.  2/13:  Patient developed AFib with RVR this morning--started amiodarone infusion.  Restarted on SLED overnight.  CT findings with a couple fluid collections in the right paraesophageal space and azygoesophageal recess.  2/14:  CT Surgery consulted and recommended IR consultation for evaluation for drainage; unfortunately, no safe window for approach.  Interventional Cardiology deferring angiography due to respiratory status.  Palliative Care consulted.  10 hours SLED overnight with removal of 2.1 L. A couple episodes of RVR overnight--increasing metoprolol this morning.  2/15:  Clotted off on CRRT overnight and restarted.  Thoracentesis performed with removal of 1.45L--sent for analysis.  Developed re-expansion pulmonary edema and started on Airvo.  2/16:  Airvo weaned down to 50L40% this morning.  Transudative effusion on analysis, remaining studies pending.  Started on vasopressor support overnight to support volume removal with CRRT.    Interval History/Significant Events:  Started on norepinephrine to support higher SCUF goals overnight.  Developed re-expansion pulmonary edema and started on Airvo, weaned down to 50L40% this morning.  Feels a little better this morning.  Discussed with  at the bedside.    Review of Systems   Respiratory:  Positive for shortness of breath.    Cardiovascular:  Negative for chest pain.   All other systems reviewed and are negative.    Objective:     Vital Signs (Most Recent):  Temp: 97.7 °F (36.5 °C) (02/16/24 1100)  Pulse: 89 (02/16/24 1255)  Resp: (!) 27  (02/16/24 1255)  BP: (!) 94/50 (02/16/24 1230)  SpO2: (!) 91 % (02/16/24 1255) Vital Signs (24h Range):  Temp:  [97.7 °F (36.5 °C)-98.8 °F (37.1 °C)] 97.7 °F (36.5 °C)  Pulse:  [81-93] 89  Resp:  [20-53] 27  SpO2:  [88 %-100 %] 91 %  BP: ()/(39-73) 94/50   Weight: 67.6 kg (149 lb)  Body mass index is 24.79 kg/m².      Intake/Output Summary (Last 24 hours) at 2/16/2024 1349  Last data filed at 2/16/2024 1300  Gross per 24 hour   Intake 4125.35 ml   Output 7352 ml   Net -3226.65 ml            Physical Exam  Vitals and nursing note reviewed.   Constitutional:       General: She is not in acute distress.     Appearance: She is normal weight. She is not ill-appearing, toxic-appearing or diaphoretic.   HENT:      Head: Normocephalic and atraumatic.      Right Ear: External ear normal.      Left Ear: External ear normal.      Nose: Nose normal.   Cardiovascular:      Rate and Rhythm: Tachycardia present. Rhythm irregular.      Pulses: Normal pulses.      Heart sounds: Normal heart sounds. No murmur heard.     No friction rub. No gallop.   Pulmonary:      Comments: Moderately increased respiratory effort with bilateral crackles  Abdominal:      General: Abdomen is flat. Bowel sounds are normal. There is no distension.      Palpations: Abdomen is soft.      Tenderness: There is no abdominal tenderness. There is no guarding or rebound.   Musculoskeletal:         General: Swelling present. No tenderness. Normal range of motion.   Skin:     General: Skin is warm and dry.      Coloration: Skin is not jaundiced or pale.   Neurological:      General: No focal deficit present.      Mental Status: She is alert and oriented to person, place, and time. Mental status is at baseline.   Psychiatric:         Mood and Affect: Mood normal.         Behavior: Behavior normal.         Thought Content: Thought content normal.         Judgment: Judgment normal.            Vents:  Oxygen Concentration (%): 40 (02/16/24  1255)  Lines/Drains/Airways       Central Venous Catheter Line  Duration             Permacath right subclavian -- days         Hemodialysis Catheter 01/25/23 1501 right internal jugular 386 days              Peripheral Intravenous Line  Duration                  Hemodialysis AV Fistula 09/01/23 0800 Left forearm 168 days         Peripheral IV - Single Lumen 02/12/24 0207 18 G Anterior;Proximal;Right Forearm 4 days         Peripheral IV - Single Lumen 02/12/24 1741 22 G;1 in Anterior;Right Wrist 3 days                  Significant Labs:    CBC/Anemia Profile:  Recent Labs   Lab 02/15/24  0238 02/16/24  0418   WBC 9.58 11.49   HGB 8.9* 10.3*   HCT 28.8* 31.4*    230   MCV 97 94   RDW 22.5* 23.2*          Chemistries:  Recent Labs   Lab 02/15/24  0238 02/15/24  1516 02/15/24  2213 02/16/24  0418   *  128*  128* 131* 131* 128*  128*   K 5.2*  5.2*  5.2* 4.5 4.5 4.3  4.3     101  101 99 100 96  96   CO2 14*  14*  14* 21* 19* 19*  19*   BUN 16  16  16 6* 6* 3*  3*   CREATININE 1.6*  1.6*  1.6* 0.7 0.7 0.5  0.5   CALCIUM 8.3*  8.3*  8.3* 7.4* 7.6* 7.8*  7.8*   ALBUMIN 2.2*  2.2*  2.2* 2.1* 1.9* 2.0*  2.0*   PROT 6.1  --   --  6.0   BILITOT 0.3  --   --  0.5   ALKPHOS 108  --   --  143*   ALT 28  --   --  161*   AST 53*  --   --  219*   MG 2.4  2.4  2.4 1.8 1.8 2.0  2.0   PHOS 5.6*  5.6*  5.6* 2.2* 2.0* 1.4*  1.4*         All pertinent labs within the past 24 hours have been reviewed.    Significant Imaging:  I have reviewed all pertinent imaging results/findings within the past 24 hours.    ABG  Recent Labs   Lab 02/13/24  0956 02/14/24  0826   PH 7.399  --    PO2 35* 34*   PCO2 36.8  --    HCO3 22.8*  --    BE -2  --      Assessment/Plan:     Psychiatric  Generalized anxiety disorder  - Continue home venlafaxine    Pulmonary  * Acute hypoxemic respiratory failure  Patient with Hypoxic Respiratory failure which is Acute on chronic.  she is on home oxygen at 2 LPM.  Supplemental oxygen was provided and noted- Oxygen Concentration (%):  [40-70] 40    .   Signs/symptoms of respiratory failure include- tachypnea, increased work of breathing, respiratory distress, and use of accessory muscles. Contributing diagnoses includes - Pleural effusion Labs and images were reviewed. Patient Has recent ABG, which has been reviewed. Will treat underlying causes and adjust management of respiratory failure as follows-     Patient without CHF on most recent Echo (01/12/24) which demonstrated EF 60-65%, no significant valvulopathy, borderline pulm HTN. On torsemide at baseline. Patient reports having some high sodium foods lately. High suspicion for hypervolemia given BNP elevation, though this may be confounded by CKD.     - Appreciated emergent HD by Nephro for volume management  - Admitted on BiPAP but weaned to nasal cannula 5 liters/minute after HD  - Repeat echocardiogram with new LVEF 15-20% with concerns for hypoperfusion  - Cardiology consulted  - Interventional Cardiology deferring coronary angiography due to respiratory status    Pleural effusion on left  On Cefpodoxime and Flagyl until planned end date of 02/15/24 per ID recs. Given recent hospitalization and re-admission will start broad spectrum Abx.     - f/u Bcx, de-escalate pending cultures  - thoracentesis 2/15 with removal of 1.4L of clear yellow fluid  - transudative on analysis   - agree with ID: discontinue antibiotics 2/17    Cardiac/Vascular  New onset of congestive heart failure  Patient is identified as having Systolic (HFrEF) heart failure that is Acute. CHF is currently uncontrolled due to Rales/crackles on pulmonary exam and Pulmonary edema/pleural effusion on CXR. Latest ECHO performed and demonstrates- Results for orders placed during the hospital encounter of 02/12/24    Echo    Interpretation Summary    Left Ventricle: The left ventricle is normal in size. Normal wall thickness. Regional wall motion abnormalities  present. See diagram for wall motion findings. There is severely reduced systolic function with a visually estimated ejection fraction of 15 - 20%. Ejection fraction by visual approximation is 15%. Unable to assess diastolic function due to atrial fibrillation.    Right Ventricle: Normal right ventricular cavity size. Wall thickness is normal. Right ventricle wall motion  is normal. Systolic function is normal.    Left Atrium: Left atrium is severely dilated.    Right Atrium: Right atrium is mildly dilated.    Aortic Valve: There is moderate aortic valve sclerosis. Mildly restricted motion.    Mitral Valve: There is moderate to severe regurgitation.    Tricuspid Valve: There is mild regurgitation.    IVC/SVC: Intermediate venous pressure at 8 mmHg.    Pericardium: There is a trivial effusion. Left pleural effusion.  . Continue Beta Blocker, ACE/ARB, and ARNI and monitor clinical status closely. Monitor on telemetry. Patient is on CHF pathway.  Monitor strict Is&Os and daily weights.  Place on fluid restriction of 1.5 L. Cardiology has been consulted. Continue to stress to patient importance of self efficacy and  on diet for CHF. Last BNP reviewed- and noted below   Recent Labs   Lab 02/12/24  0115   BNP 2,754*         Atrial fibrillation  Continue home med lopressor. No therapeutic anticoagulation for now given GIB during previous hospitalization    - 2/13:  Developed AFib with RVR and started on amiodarone infusion  - 2/15:  Transitioning to oral amiodarone     Hyperlipidemia, mixed  - Continue home statin    Renal/  Chronic kidney disease-mineral and bone disorder  - Continue home Sevelamer    ESRD (end stage renal disease)  Patient reports making small volumes of urine. Last HD on Saturday     - Nephro consulted for HD needs    Oncology  Anemia in ESRD (end-stage renal disease)  Has anemia of ESRD. Goal Hemoglobin >10    Endocrine  Moderate malnutrition  Nutrition consulted. Most recent weight and BMI  monitored-     Measurements:  Wt Readings from Last 1 Encounters:   02/14/24 67.6 kg (149 lb)   Body mass index is 24.79 kg/m².    Patient has been screened and assessed by RD.    Malnutrition Type:  Context: chronic illness  Level: moderate    Malnutrition Characteristic Summary:  Energy Intake (Malnutrition): less than 75% for greater than or equal to 1 month  Muscle Mass (Malnutrition): mild depletion  Fluid Accumulation (Malnutrition): severe    Interventions/Recommendations (treatment strategy):  1. Continue gluten free diet with texture per SLP  2. modify ONS to [employer] renal TID  3. RD to monitor and follow      Type 2 diabetes mellitus with diabetic polyneuropathy, without long-term current use of insulin  Last A1C 4.9    - LD SSI, add scheduled detemir and aspart if needed    GI  GIB (gastrointestinal bleeding)  - Recent GIB on last admission with GI recommending 8 week course of Protonix s/p EGD with nonbleeding gastric ulcer. PPI end date 02/23/24 and follow up EGD planned at that time. Continue PPI while inpatient.               Critical Care Time: 45 minutes  Critical secondary to Patient has a condition that poses threat to life and bodily function: Severe Respiratory Distress      Critical care was time spent personally by me on the following activities: development of treatment plan with patient or surrogate and bedside caregivers, discussions with consultants, evaluation of patient's response to treatment, examination of patient, ordering and performing treatments and interventions, ordering and review of laboratory studies, ordering and review of radiographic studies, pulse oximetry, re-evaluation of patient's condition. This critical care time did not overlap with that of any other provider or involve time for any procedures.         Meghan Trujillo M.D.  Rapid Response/Critical Care  Department of Pulmonary Medicine  [facility]        N.B.: Portions of this note was  dictated using M*Modal Fluency Direct--there may be voice recognition errors occasionally missed on review.

## 2024-02-16 NOTE — PLAN OF CARE
Advance Care Planning   [facility]  Palliative Care       Patient Name: Lily Green  MRN: [id number]  Admission Date: 2/12/2024  Hospital Length of Stay: 4 days  Code Status: Full Code   Attending Provider: Meghan Trujillo MD  Palliative Care Provider: EMORY Viera   Primary Care Physician: Pavel Calixto MD  Principal Problem:Acute hypoxemic respiratory failure    Palliative APRN and this  visited patient and spouse in hospital room. Deric stated he hopes to be able to talk with patient about goals of care when she is more alert. Also Deric stated wanting to involve his niece and her  in the conversation. Deric stated to check back.  will follow for support.     Pavel Alves LCSW  Palliative Medicine

## 2024-02-16 NOTE — PROGRESS NOTES
02/15/24 2013   Treatment   Treatment Type SLED   Treatment Status Restart   Dialysis Machine Number [id number]   Dialyzer Time (hours) 0   BVP (Liters) 0 L   Solutions Labeled and Current  Yes   Access Temporary Cath;Right;IJ   Catheter Dressing Intact  Yes   Alarms Engaged Yes   CRRT Comments   (CRRT/SLED restarted and to run for 12 hours.)   Prescription   Time (Hours) 12   Dialysate K + (mEq/L) 4   Dialysate CA + (mEq/L) 2.25   Dialysate HCO3 - (Bicarb) (mEq/L) 30   Dialysate Na + (mEq/L) 138   Cartridge Type Other  (R300)   Dialysate Flow Rate (mL/min) 200   UF Goal Rate 400 mL/hr     Treatment running well. RIJ HD cath site without redness, swelling, or drainage.

## 2024-02-16 NOTE — ASSESSMENT & PLAN NOTE
Continue home med lopressor. No therapeutic anticoagulation for now given GIB during previous hospitalization    - 2/13:  Developed AFib with RVR and started on amiodarone infusion  - 2/15:  Transitioning to oral amiodarone

## 2024-02-16 NOTE — ASSESSMENT & PLAN NOTE
Nutrition consulted. Most recent weight and BMI monitored-     Measurements:  Wt Readings from Last 1 Encounters:   02/14/24 67.6 kg (149 lb)   Body mass index is 24.79 kg/m².    Patient has been screened and assessed by RD.    Malnutrition Type:  Context: chronic illness  Level: moderate    Malnutrition Characteristic Summary:  Energy Intake (Malnutrition): less than 75% for greater than or equal to 1 month  Muscle Mass (Malnutrition): mild depletion  Fluid Accumulation (Malnutrition): severe    Interventions/Recommendations (treatment strategy):  1. Continue gluten free diet with texture per SLP  2. [facility]  3. RD to monitor and follow

## 2024-02-16 NOTE — PROGRESS NOTES
[facility]  Infectious Disease  Progress Note    Patient Name: Lily Green  MRN: [id number]  Admission Date: 2/12/2024  Length of Stay: 4 days  Attending Physician: Meghan Trujillo MD  Primary Care Provider: Pavel Calixto MD    Isolation Status: No active isolations  Assessment/Plan:      Pulmonary  * Acute hypoxemic respiratory failure  I have reviewed hospital notes from  MICU service and other specialty providers. I have also reviewed CBC, CMP/BMP,  cultures and imaging with my interpretation as documented.     On admission. CXR today with large left pleural effusion. Now on high flow NC. S/p thoracentesis on 2/15. Fluid gram stain without organisms and culture NGTD.  Continue Zosyn and vancomycin.  Monitor vancomycin trough.  Can likely complete a 5 day course of therapy on 2/17 then discontinue and monitor off antibiotics if cultures remain no growth.   Discussed management plan with the staff and/or members from MICU service.      Pleural effusion on left  Seen on CXR. Unclear if infected or uninfected at this time. Fluid collection near esophagus determined to not be due to esophageal leak by CTS. S/P thoracentesis.  Antibiotics as above.  Will follow up cultures.  Other studies per MICU.        Anticipated Disposition: per primary    Thank you for your consult. I will follow-up with patient. Please contact us if you have any additional questions.    Rosio Rivera MD  Infectious Disease  [facility]    50 minutes of total time spent on the encounter, which includes face to face time and non-face to face time preparing to see the patient (eg, review of tests), obtaining and/or reviewing separately obtained history, documenting clinical information in the electronic or other health record, independently interpreting results (not separately reported) and communicating results to the patient/family/caregiver, or care coordination (not separately  reported).     Subjective:     Principal Problem:Acute hypoxemic respiratory failure    HPI: 73 year old female with a history of DM2, hyperlipidemia, and right middle lobe carcinoid tumor s/p open thoracotomy with wedge resection to right middle lobe and lymph node resection on January 2, 2024.  Her post surgical course was complicated by bilateral pleural effusions and respiratory cultures that were positive for  Klebsiella pneumoniae and E coli.  She was on meropenem initially but was later transitioned to ceftriaxone and metronidazole.  She also underwent leadless pacemaker placement on January 16, 2024 during that hospital stay.  After around 35 days in the hospital, she was discharged from the hospital on cefpodoxime and metronidazole around February 6.  She is re-admitted with acute shortness of breath.  Repeat chest imaging shows trace bilateral pleural effusions.  CT also shows some small amount of fluid and gas with inflammatory change in the azygoesphageal recess.  ID is consulted to evaluate the patient.  Interval History: ". 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  She was on meropenem, then transitioned to ceftriaxone and metronidazole.  She was ultimately discharged on cefpodoxime and metronidazole with a plan to complete 4 weeks of antibiotics on February 15.  She is now re-admitted with respiratory distress.  ID consulted to assist with her care.    Small amount of fluid and gas with associated inflammatory changes in the azygoesophageal recess.  Per discussions with CT surgery and IR, no invasive procedures can be performed for this.  CXR on 2/15 with large left pleural effusion. Receiving CRRT and s/p thoracentesis on 2/15.    Review of Systems   Unable to perform ROS: Severe respiratory distress     Objective:     Vital  Signs (Most Recent):  Temp: 98 °F (36.7 °C) (02/16/24 0701)  Pulse: 88 (02/16/24 1100)  Resp: (!) 36 (02/16/24 1100)  BP: (!) 89/63 (02/16/24 1100)  SpO2: 100 % (02/16/24 1100) Vital Signs (24h Range):  Temp:  [98 °F (36.7 °C)-98.8 °F (37.1 °C)] 98 °F (36.7 °C)  Pulse:  [81-93] 88  Resp:  [20-53] 36  SpO2:  [88 %-100 %] 100 %  BP: ()/(39-73) 89/63     Weight: 67.6 kg (149 lb)  Body mass index is 24.79 kg/m².    Estimated Creatinine Clearance: 90.2 mL/min (based on SCr of 0.5 mg/dL).     Physical Exam  Vitals and nursing note reviewed.   Constitutional:       Appearance: She is well-developed.   HENT:      Head: Normocephalic and atraumatic.      Right Ear: External ear normal.      Left Ear: External ear normal.   Eyes:      General: No scleral icterus.        Right eye: No discharge.         Left eye: No discharge.      Conjunctiva/sclera: Conjunctivae normal.   Cardiovascular:      Rate and Rhythm: Normal rate and regular rhythm.      Heart sounds: Normal heart sounds. No murmur heard.     No friction rub. No gallop.   Pulmonary:      Effort: Pulmonary effort is normal. Tachypnea present. No respiratory distress.      Breath sounds: No stridor. Examination of the left-middle field reveals rales. Examination of the left-lower field reveals decreased breath sounds. Decreased breath sounds and rales present. No wheezing.   Abdominal:      General: Bowel sounds are normal.      Palpations: Abdomen is soft.   Musculoskeletal:         General: No tenderness.      Right lower leg: No edema.      Left lower leg: No edema.   Skin:     General: Skin is warm and dry.   Neurological:      Mental Status: She is lethargic.          Significant Labs: BMP:   Recent Labs   Lab 02/16/24  0418     102   *  128*   K 4.3  4.3   CL 96  96   CO2 19*  19*   BUN 3*  3*   CREATININE 0.5  0.5   CALCIUM 7.8*  7.8*   MG 2.0  2.0     CBC:   Recent Labs   Lab 02/15/24  0238 02/16/24  0418   WBC 9.58 11.49   HGB 8.9*  10.3*   HCT 28.8* 31.4*    230     Microbiology Results (last 7 days)       Procedure Component Value Units Date/Time    Culture, Anaerobe [[id number]] Collected: 02/15/24 1522    Order Status: Completed Specimen: Pleural Fluid Updated: 02/16/24 0954     Anaerobic Culture Culture in progress    Blood culture [[id number]] Collected: 02/12/24 0526    Order Status: Completed Specimen: Blood from Peripheral, Antecubital, Right Updated: 02/16/24 0812     Blood Culture, Routine No Growth to date      No Growth to date      No Growth to date      No Growth to date      No Growth to date    Blood culture [[id number]] Collected: 02/12/24 0526    Order Status: Completed Specimen: Blood from Peripheral, Upper Arm, Right Updated: 02/16/24 0812     Blood Culture, Routine No Growth to date      No Growth to date      No Growth to date      No Growth to date      No Growth to date    Aerobic culture [[id number]] Collected: 02/15/24 1522    Order Status: Completed Specimen: Pleural Fluid Updated: 02/16/24 0726     Aerobic Bacterial Culture No growth    Gram stain [[id number]] Collected: 02/15/24 1522    Order Status: Completed Specimen: Pleural Fluid Updated: 02/15/24 1939     Gram Stain Result Rare WBC's      No organisms seen    KOH prep [[id number]] Collected: 02/15/24 1522    Order Status: Completed Specimen: Pleural Fluid Updated: 02/15/24 1843     KOH Prep No yeast or fungal elements seen    Fungus culture [[id number]] Collected: 02/15/24 1522    Order Status: Sent Specimen: Pleural Fluid Updated: 02/15/24 1551    AFB Culture & Smear [[id number]] Collected: 02/15/24 1522    Order Status: Sent Specimen: Pleural Fluid Updated: 02/15/24 1523            Significant Imaging: I have reviewed all pertinent imaging results/findings within the past 24 hours.

## 2024-02-16 NOTE — PLAN OF CARE
SICU PLAN OF CARE NOTE    Dx: Acute hypoxemic respiratory failure    Goals of Care: Maintain pt on SLED for 12hs, Keep MAP>65    Vital Signs (last 12 hours):   Temp:  [98.8 °F (37.1 °C)]   Pulse:  [82-93]   Resp:  [20-48]   BP: ()/(45-69)   SpO2:  [88 %-100 %]      Neuro: AAO x4, Follows Commands, and Moves All Extremities    Cardiac: NSR    Respiratory: HFNC 40L, 70%    Gtts: Norepinephrine    Urine Output: Anuric 0 cc/shift    Dialysis: SLED, UF Rate: 400/hr      Diet: Diabetic Diet, BMx1       Labs/Accuchecks: ACHS, Q8 Renal function/Mag    Skin:  All skin remains free from injury.  Patient turned Q2, [place] mattress inflated, and bed working correctly.    Shift Events:  Pt with increased work of breathing at beginning of shift. Oncall NP notified, PRN anxiolytics ordered and given with moderate relief. SLED still running at UF of 400cc/hr, Levophed started to maintain MAP>65. EDEN noted overnight.  See flowsheet for further assessment/details.  Caregiver updated on current condition/plan of care, questions answered, and emotional support provided.  Oncall NP updated on current condition, vitals, labs, and gtts.

## 2024-02-16 NOTE — ASSESSMENT & PLAN NOTE
On Cefpodoxime and Flagyl until planned end date of 02/15/24 per ID recs. Given recent hospitalization and re-admission will start broad spectrum Abx.     - f/u Bcx, de-escalate pending cultures  - thoracentesis 2/15 with removal of 1.4L of clear yellow fluid  - transudative on analysis   - agree with ID: discontinue antibiotics 2/17

## 2024-02-16 NOTE — ASSESSMENT & PLAN NOTE
Seen on CXR. Unclear if infected or uninfected at this time. Fluid collection near esophagus determined to not be due to esophageal leak by CTS. S/P thoracentesis.  Antibiotics as above.  Will follow up cultures.  Other studies per MICU.

## 2024-02-16 NOTE — PLAN OF CARE
[facility]  Discharge Reassessment    Primary Care Provider: Pavel Calixto MD    Expected Discharge Date:     Reassessment (most recent)       Discharge Reassessment - 02/16/24 0959          Discharge Reassessment    Assessment Type Discharge Planning Reassessment     Communicated CAMPBELL with patient/caregiver Date not available/Unable to determine     Discharge Plan A Skilled Nursing Facility     Discharge Plan B Skilled Nursing Facility     DME Needed Upon Discharge  none     Transition of Care Barriers None     Why the patient remains in the hospital Requires continued medical care                   Discharge Plan A and Plan B have been determined by review of patient's clinical status, future medical and therapeutic needs, and coverage/benefits for post-acute care in coordination with multidisciplinary team members.     Alberto Fitch LMSW  Case Management [facility]

## 2024-02-16 NOTE — ASSESSMENT & PLAN NOTE
Patient with Hypoxic Respiratory failure which is Acute on chronic.  she is on home oxygen at 2 LPM. Supplemental oxygen was provided and noted- Oxygen Concentration (%):  [40-70] 40    .   Signs/symptoms of respiratory failure include- tachypnea, increased work of breathing, respiratory distress, and use of accessory muscles. Contributing diagnoses includes - Pleural effusion Labs and images were reviewed. Patient Has recent ABG, which has been reviewed. Will treat underlying causes and adjust management of respiratory failure as follows-     Patient without CHF on most recent Echo (01/12/24) which demonstrated EF 60-65%, no significant valvulopathy, borderline pulm HTN. On torsemide at baseline. Patient reports having some high sodium foods lately. High suspicion for hypervolemia given BNP elevation, though this may be confounded by CKD.     - Appreciated emergent HD by Nephro for volume management  - Admitted on BiPAP but weaned to nasal cannula 5 liters/minute after HD  - Repeat echocardiogram with new LVEF 15-20% with concerns for hypoperfusion  - Cardiology consulted  - Interventional Cardiology deferring coronary angiography due to respiratory status

## 2024-02-16 NOTE — SUBJECTIVE & OBJECTIVE
Interval History/Significant Events:  Started on norepinephrine to support higher SCUF goals overnight.  Developed re-expansion pulmonary edema and started on Airvo, weaned down to 50L40% this morning.  Feels a little better this morning.  Discussed with  at the bedside.    Review of Systems   Respiratory:  Positive for shortness of breath.    Cardiovascular:  Negative for chest pain.   All other systems reviewed and are negative.    Objective:     Vital Signs (Most Recent):  Temp: 97.7 °F (36.5 °C) (02/16/24 1100)  Pulse: 89 (02/16/24 1255)  Resp: (!) 27 (02/16/24 1255)  BP: (!) 94/50 (02/16/24 1230)  SpO2: (!) 91 % (02/16/24 1255) Vital Signs (24h Range):  Temp:  [97.7 °F (36.5 °C)-98.8 °F (37.1 °C)] 97.7 °F (36.5 °C)  Pulse:  [81-93] 89  Resp:  [20-53] 27  SpO2:  [88 %-100 %] 91 %  BP: ()/(39-73) 94/50   Weight: 67.6 kg (149 lb)  Body mass index is 24.79 kg/m².      Intake/Output Summary (Last 24 hours) at 2/16/2024 1349  Last data filed at 2/16/2024 1300  Gross per 24 hour   Intake 4125.35 ml   Output 7352 ml   Net -3226.65 ml            Physical Exam  Vitals and nursing note reviewed.   Constitutional:       General: She is not in acute distress.     Appearance: She is normal weight. She is not ill-appearing, toxic-appearing or diaphoretic.   HENT:      Head: Normocephalic and atraumatic.      Right Ear: External ear normal.      Left Ear: External ear normal.      Nose: Nose normal.   Cardiovascular:      Rate and Rhythm: Tachycardia present. Rhythm irregular.      Pulses: Normal pulses.      Heart sounds: Normal heart sounds. No murmur heard.     No friction rub. No gallop.   Pulmonary:      Comments: Moderately increased respiratory effort with bilateral crackles  Abdominal:      General: Abdomen is flat. Bowel sounds are normal. There is no distension.      Palpations: Abdomen is soft.      Tenderness: There is no abdominal tenderness. There is no guarding or rebound.   Musculoskeletal:          General: Swelling present. No tenderness. Normal range of motion.   Skin:     General: Skin is warm and dry.      Coloration: Skin is not jaundiced or pale.   Neurological:      General: No focal deficit present.      Mental Status: She is alert and oriented to person, place, and time. Mental status is at baseline.   Psychiatric:         Mood and Affect: Mood normal.         Behavior: Behavior normal.         Thought Content: Thought content normal.         Judgment: Judgment normal.            Vents:  Oxygen Concentration (%): 40 (02/16/24 1255)  Lines/Drains/Airways       Central Venous Catheter Line  Duration             Permacath right subclavian -- days         Hemodialysis Catheter 01/25/23 1501 right internal jugular 386 days              Peripheral Intravenous Line  Duration                  Hemodialysis AV Fistula 09/01/23 0800 Left forearm 168 days         Peripheral IV - Single Lumen 02/12/24 0207 18 G Anterior;Proximal;Right Forearm 4 days         Peripheral IV - Single Lumen 02/12/24 1741 22 G;1 in Anterior;Right Wrist 3 days                  Significant Labs:    CBC/Anemia Profile:  Recent Labs   Lab 02/15/24  0238 02/16/24  0418   WBC 9.58 11.49   HGB 8.9* 10.3*   HCT 28.8* 31.4*    230   MCV 97 94   RDW 22.5* 23.2*          Chemistries:  Recent Labs   Lab 02/15/24  0238 02/15/24  1516 02/15/24  2213 02/16/24  0418   *  128*  128* 131* 131* 128*  128*   K 5.2*  5.2*  5.2* 4.5 4.5 4.3  4.3     101  101 99 100 96  96   CO2 14*  14*  14* 21* 19* 19*  19*   BUN 16  16  16 6* 6* 3*  3*   CREATININE 1.6*  1.6*  1.6* 0.7 0.7 0.5  0.5   CALCIUM 8.3*  8.3*  8.3* 7.4* 7.6* 7.8*  7.8*   ALBUMIN 2.2*  2.2*  2.2* 2.1* 1.9* 2.0*  2.0*   PROT 6.1  --   --  6.0   BILITOT 0.3  --   --  0.5   ALKPHOS 108  --   --  143*   ALT 28  --   --  161*   AST 53*  --   --  219*   MG 2.4  2.4  2.4 1.8 1.8 2.0  2.0   PHOS 5.6*  5.6*  5.6* 2.2* 2.0* 1.4*  1.4*         All pertinent  labs within the past 24 hours have been reviewed.    Significant Imaging:  I have reviewed all pertinent imaging results/findings within the past 24 hours.

## 2024-02-16 NOTE — PROGRESS NOTES
Pharmacokinetic Assessment Follow Up: IV Vancomycin    Vancomycin serum concentration assessment(s):    Vancomycin random level resulted at 8.8 mcg/mL. Goal level is 10 to 20 mcg/mL.     Nephrology is consulted for ESRD and plans for SCUF.    Drug levels (last 3 results):  Recent Labs   Lab Result Units 02/14/24  0525 02/16/24  0418   Vancomycin, Random ug/mL 11.2 8.8     Vancomycin Regimen Plan:    Administer vancomycin IV 1250 mg. Next random level to be obtained with morning labs on 2/17.     Pharmacy will continue to follow and monitor vancomycin.    Please contact pharmacy at extension [phone] for questions regarding this assessment.    Thank you for the consult,   Rupa Lemus, PharmD, BCCCP                 Patient brief summary:  Lily Green is a 73 y.o. female initiated on antimicrobial therapy with IV vancomycin for treatment of sepsis      Drug Allergies:   Review of patient's allergies indicates:   Allergen Reactions    Crestor [rosuvastatin] Swelling    Gluten protein        Actual Body Weight:   67.6 kg    Renal Function:   Estimated Creatinine Clearance: 90.2 mL/min (based on SCr of 0.5 mg/dL).    Dialysis Method (if applicable):  SCUF    CBC (last 72 hours):  Recent Labs   Lab Result Units 02/14/24  0305 02/15/24  0238 02/16/24  0418   WBC K/uL 9.83 9.58 11.49   Hemoglobin g/dL 8.4* 8.9* 10.3*   Hematocrit % 27.2* 28.8* 31.4*   Platelets K/uL 188 223 230   Gran % % 78.2* 82.4* 80.8*   Lymph % % 10.9* 8.8* 9.5*   Mono % % 10.3 7.9 8.8   Eosinophil % % 0.0 0.1 0.1   Basophil % % 0.1 0.1 0.1   Differential Method  Automated Automated Automated         Metabolic Panel (last 72 hours):  Recent Labs   Lab Result Units 02/13/24  1354 02/13/24  2201 02/14/24  0305 02/14/24  2221 02/15/24  0238 02/15/24  1516 02/15/24  2213 02/16/24  0418   Sodium mmol/L 130* 130* 129*  129* 127*  127* 128*  128*  128* 131* 131* 128*  128*   Potassium mmol/L 3.9 4.3 4.2  4.2 4.8  4.8 5.2*  5.2*  5.2*  4.5 4.5 4.3  4.3   Chloride mmol/L 98 98 97  97 99  99 101  101  101 99 100 96  96   CO2 mmol/L 22* 23 22*  22* 17*  17* 14*  14*  14* 21* 19* 19*  19*   Glucose mg/dL 261* 221* 299*  299* 214*  214* 200*  200*  200* 133* 124* 102  102   BUN mg/dL 15 9 5*  5* 12  12 16  16  16 6* 6* 3*  3*   Creatinine mg/dL 1.5* 1.1 0.8  0.8 1.3  1.3 1.6*  1.6*  1.6* 0.7 0.7 0.5  0.5   Albumin g/dL 2.1* 2.1* 2.0*  2.0* 2.1*  2.1* 2.2*  2.2*  2.2* 2.1* 1.9* 2.0*  2.0*   Total Bilirubin mg/dL  --   --  0.3  --  0.3  --   --  0.5   Alkaline Phosphatase U/L  --   --  88  --  108  --   --  143*   AST U/L  --   --  18  --  53*  --   --  219*   ALT U/L  --   --  9*  --  28  --   --  161*   Magnesium mg/dL 1.8 2.1 1.9  1.9 2.4  2.4 2.4  2.4  2.4 1.8 1.8 2.0  2.0   Phosphorus mg/dL 4.4 2.4* 3.0  3.0 5.2*  5.2* 5.6*  5.6*  5.6* 2.2* 2.0* 1.4*  1.4*         Vancomycin Administrations:  vancomycin given in the last 96 hours                     vancomycin (VANCOCIN) 1,000 mg in dextrose 5 % (D5W) 250 mL IVPB (Vial-Mate) (mg) 1,000 mg New Bag 02/13/24 1337    vancomycin (VANCOCIN) 1,000 mg in dextrose 5 % (D5W) 250 mL IVPB (Vial-Mate) (mg) 1,000 mg New Bag 02/12/24 0533                    Microbiologic Results:  Microbiology Results (last 7 days)       Procedure Component Value Units Date/Time    Culture, Anaerobe [[id number]] Collected: 02/15/24 1522    Order Status: Completed Specimen: Pleural Fluid Updated: 02/16/24 0954     Anaerobic Culture Culture in progress    Blood culture [[id number]] Collected: 02/12/24 0526    Order Status: Completed Specimen: Blood from Peripheral, Antecubital, Right Updated: 02/16/24 0812     Blood Culture, Routine No Growth to date      No Growth to date      No Growth to date      No Growth to date      No Growth to date    Blood culture [[id number]] Collected: 02/12/24 0526    Order Status: Completed Specimen: Blood from Peripheral, Upper Arm, Right Updated: 02/16/24 0812      Blood Culture, Routine No Growth to date      No Growth to date      No Growth to date      No Growth to date      No Growth to date    Aerobic culture [[id number]] Collected: 02/15/24 1522    Order Status: Completed Specimen: Pleural Fluid Updated: 02/16/24 0726     Aerobic Bacterial Culture No growth    Gram stain [[id number]] Collected: 02/15/24 1522    Order Status: Completed Specimen: Pleural Fluid Updated: 02/15/24 1939     Gram Stain Result Rare WBC's      No organisms seen    KOH prep [[id number]] Collected: 02/15/24 1522    Order Status: Completed Specimen: Pleural Fluid Updated: 02/15/24 1843     KOH Prep No yeast or fungal elements seen    Fungus culture [[id number]] Collected: 02/15/24 1522    Order Status: Sent Specimen: Pleural Fluid Updated: 02/15/24 1551    AFB Culture & Smear [[id number]] Collected: 02/15/24 1522    Order Status: Sent Specimen: Pleural Fluid Updated: 02/15/24 1523

## 2024-02-16 NOTE — NURSING
Notified provider, BRITTANI Meyer of patient being started on SLED with BP of 96/58 with starting UF of 50 given soft BP. NP to order levophed to maintain MAP>65.  Also spoke to NP about patient being tachypneic with RR in the 40's with increased O2 requirements post thoracentesis, inquired about performing an ABG; per NP, will hold off on doing ABG now unless O2 sat drops overnight.  NP to order CXR in the morning and PRN anxiolytics.

## 2024-02-17 LAB
ALBUMIN SERPL BCP-MCNC: 1.8 G/DL (ref 3.5–5.2)
ALBUMIN SERPL BCP-MCNC: 1.9 G/DL (ref 3.5–5.2)
ALBUMIN SERPL BCP-MCNC: 2 G/DL (ref 3.5–5.2)
ALBUMIN SERPL BCP-MCNC: 2 G/DL (ref 3.5–5.2)
ALP SERPL-CCNC: 119 U/L (ref 55–135)
ALT SERPL W/O P-5'-P-CCNC: 113 U/L (ref 10–44)
ANION GAP SERPL CALC-SCNC: 10 MMOL/L (ref 8–16)
ANION GAP SERPL CALC-SCNC: 10 MMOL/L (ref 8–16)
ANION GAP SERPL CALC-SCNC: 12 MMOL/L (ref 8–16)
ANION GAP SERPL CALC-SCNC: 9 MMOL/L (ref 8–16)
AST SERPL-CCNC: 85 U/L (ref 10–40)
BACTERIA BLD CULT: NORMAL
BACTERIA BLD CULT: NORMAL
BASOPHILS # BLD AUTO: 0.01 K/UL (ref 0–0.2)
BASOPHILS NFR BLD: 0.1 % (ref 0–1.9)
BILIRUB SERPL-MCNC: 0.4 MG/DL (ref 0.1–1)
BUN SERPL-MCNC: 12 MG/DL (ref 8–23)
BUN SERPL-MCNC: 4 MG/DL (ref 8–23)
BUN SERPL-MCNC: <3 MG/DL (ref 8–23)
BUN SERPL-MCNC: <3 MG/DL (ref 8–23)
CALCIUM SERPL-MCNC: 7.3 MG/DL (ref 8.7–10.5)
CALCIUM SERPL-MCNC: 7.3 MG/DL (ref 8.7–10.5)
CALCIUM SERPL-MCNC: 7.4 MG/DL (ref 8.7–10.5)
CALCIUM SERPL-MCNC: 7.5 MG/DL (ref 8.7–10.5)
CHLORIDE SERPL-SCNC: 95 MMOL/L (ref 95–110)
CHLORIDE SERPL-SCNC: 96 MMOL/L (ref 95–110)
CO2 SERPL-SCNC: 20 MMOL/L (ref 23–29)
CO2 SERPL-SCNC: 22 MMOL/L (ref 23–29)
CO2 SERPL-SCNC: 22 MMOL/L (ref 23–29)
CO2 SERPL-SCNC: 24 MMOL/L (ref 23–29)
CREAT SERPL-MCNC: 0.4 MG/DL (ref 0.5–1.4)
CREAT SERPL-MCNC: 0.4 MG/DL (ref 0.5–1.4)
CREAT SERPL-MCNC: 0.6 MG/DL (ref 0.5–1.4)
CREAT SERPL-MCNC: 0.7 MG/DL (ref 0.5–1.4)
DIFFERENTIAL METHOD BLD: ABNORMAL
EOSINOPHIL # BLD AUTO: 0 K/UL (ref 0–0.5)
EOSINOPHIL NFR BLD: 0 % (ref 0–8)
ERYTHROCYTE [DISTWIDTH] IN BLOOD BY AUTOMATED COUNT: 22.9 % (ref 11.5–14.5)
EST. GFR  (NO RACE VARIABLE): >60 ML/MIN/1.73 M^2
GLUCOSE SERPL-MCNC: 123 MG/DL (ref 70–110)
GLUCOSE SERPL-MCNC: 142 MG/DL (ref 70–110)
GLUCOSE SERPL-MCNC: 142 MG/DL (ref 70–110)
GLUCOSE SERPL-MCNC: 220 MG/DL (ref 70–110)
HCT VFR BLD AUTO: 26.9 % (ref 37–48.5)
HGB BLD-MCNC: 8.7 G/DL (ref 12–16)
IMM GRANULOCYTES # BLD AUTO: 0.09 K/UL (ref 0–0.04)
IMM GRANULOCYTES NFR BLD AUTO: 0.8 % (ref 0–0.5)
LYMPHOCYTES # BLD AUTO: 0.7 K/UL (ref 1–4.8)
LYMPHOCYTES NFR BLD: 6 % (ref 18–48)
MAGNESIUM SERPL-MCNC: 1.7 MG/DL (ref 1.6–2.6)
MAGNESIUM SERPL-MCNC: 1.8 MG/DL (ref 1.6–2.6)
MAGNESIUM SERPL-MCNC: 1.9 MG/DL (ref 1.6–2.6)
MAGNESIUM SERPL-MCNC: 1.9 MG/DL (ref 1.6–2.6)
MCH RBC QN AUTO: 30.4 PG (ref 27–31)
MCHC RBC AUTO-ENTMCNC: 32.3 G/DL (ref 32–36)
MCV RBC AUTO: 94 FL (ref 82–98)
MONOCYTES # BLD AUTO: 0.8 K/UL (ref 0.3–1)
MONOCYTES NFR BLD: 6.6 % (ref 4–15)
NEUTROPHILS # BLD AUTO: 9.9 K/UL (ref 1.8–7.7)
NEUTROPHILS NFR BLD: 86.5 % (ref 38–73)
NRBC BLD-RTO: 0 /100 WBC
PHOSPHATE SERPL-MCNC: 1.9 MG/DL (ref 2.7–4.5)
PHOSPHATE SERPL-MCNC: 3.1 MG/DL (ref 2.7–4.5)
PHOSPHATE SERPL-MCNC: 3.1 MG/DL (ref 2.7–4.5)
PHOSPHATE SERPL-MCNC: 7.2 MG/DL (ref 2.7–4.5)
PLATELET # BLD AUTO: 161 K/UL (ref 150–450)
PMV BLD AUTO: 10.5 FL (ref 9.2–12.9)
POCT GLUCOSE: 116 MG/DL (ref 70–110)
POCT GLUCOSE: 191 MG/DL (ref 70–110)
POCT GLUCOSE: 212 MG/DL (ref 70–110)
POCT GLUCOSE: 228 MG/DL (ref 70–110)
POCT GLUCOSE: 242 MG/DL (ref 70–110)
POTASSIUM SERPL-SCNC: 3.6 MMOL/L (ref 3.5–5.1)
POTASSIUM SERPL-SCNC: 4.3 MMOL/L (ref 3.5–5.1)
POTASSIUM SERPL-SCNC: 4.3 MMOL/L (ref 3.5–5.1)
POTASSIUM SERPL-SCNC: 4.5 MMOL/L (ref 3.5–5.1)
PROT SERPL-MCNC: 5.5 G/DL (ref 6–8.4)
RBC # BLD AUTO: 2.86 M/UL (ref 4–5.4)
SODIUM SERPL-SCNC: 127 MMOL/L (ref 136–145)
SODIUM SERPL-SCNC: 127 MMOL/L (ref 136–145)
SODIUM SERPL-SCNC: 128 MMOL/L (ref 136–145)
SODIUM SERPL-SCNC: 128 MMOL/L (ref 136–145)
VANCOMYCIN SERPL-MCNC: 9.1 UG/ML
WBC # BLD AUTO: 11.46 K/UL (ref 3.9–12.7)

## 2024-02-17 PROCEDURE — 99900035 HC TECH TIME PER 15 MIN (STAT)

## 2024-02-17 PROCEDURE — 25000003 PHARM REV CODE 250

## 2024-02-17 PROCEDURE — 83735 ASSAY OF MAGNESIUM: CPT | Performed by: INTERNAL MEDICINE

## 2024-02-17 PROCEDURE — 25000003 PHARM REV CODE 250: Performed by: STUDENT IN AN ORGANIZED HEALTH CARE EDUCATION/TRAINING PROGRAM

## 2024-02-17 PROCEDURE — 99231 SBSQ HOSP IP/OBS SF/LOW 25: CPT | Mod: ,,, | Performed by: STUDENT IN AN ORGANIZED HEALTH CARE EDUCATION/TRAINING PROGRAM

## 2024-02-17 PROCEDURE — 27100171 HC OXYGEN HIGH FLOW UP TO 24 HOURS

## 2024-02-17 PROCEDURE — 80100008 HC CRRT DAILY MAINTENANCE

## 2024-02-17 PROCEDURE — 63600175 PHARM REV CODE 636 W HCPCS: Performed by: STUDENT IN AN ORGANIZED HEALTH CARE EDUCATION/TRAINING PROGRAM

## 2024-02-17 PROCEDURE — 85025 COMPLETE CBC W/AUTO DIFF WBC: CPT | Performed by: INTERNAL MEDICINE

## 2024-02-17 PROCEDURE — 80053 COMPREHEN METABOLIC PANEL: CPT | Performed by: INTERNAL MEDICINE

## 2024-02-17 PROCEDURE — 80202 ASSAY OF VANCOMYCIN: CPT | Performed by: INTERNAL MEDICINE

## 2024-02-17 PROCEDURE — 90945 DIALYSIS ONE EVALUATION: CPT

## 2024-02-17 PROCEDURE — 80069 RENAL FUNCTION PANEL: CPT | Mod: 91 | Performed by: STUDENT IN AN ORGANIZED HEALTH CARE EDUCATION/TRAINING PROGRAM

## 2024-02-17 PROCEDURE — 99233 SBSQ HOSP IP/OBS HIGH 50: CPT | Mod: ,,, | Performed by: INTERNAL MEDICINE

## 2024-02-17 PROCEDURE — 84100 ASSAY OF PHOSPHORUS: CPT | Performed by: INTERNAL MEDICINE

## 2024-02-17 PROCEDURE — 25000003 PHARM REV CODE 250: Performed by: NURSE PRACTITIONER

## 2024-02-17 PROCEDURE — 25000003 PHARM REV CODE 250: Performed by: INTERNAL MEDICINE

## 2024-02-17 PROCEDURE — 83735 ASSAY OF MAGNESIUM: CPT | Mod: 91 | Performed by: STUDENT IN AN ORGANIZED HEALTH CARE EDUCATION/TRAINING PROGRAM

## 2024-02-17 PROCEDURE — 90945 DIALYSIS ONE EVALUATION: CPT | Mod: ,,, | Performed by: INTERNAL MEDICINE

## 2024-02-17 PROCEDURE — 63600175 PHARM REV CODE 636 W HCPCS: Performed by: INTERNAL MEDICINE

## 2024-02-17 PROCEDURE — 63600175 PHARM REV CODE 636 W HCPCS

## 2024-02-17 PROCEDURE — 94761 N-INVAS EAR/PLS OXIMETRY MLT: CPT

## 2024-02-17 PROCEDURE — 20000000 HC ICU ROOM

## 2024-02-17 RX ORDER — HYDROCODONE BITARTRATE AND ACETAMINOPHEN 500; 5 MG/1; MG/1
TABLET ORAL CONTINUOUS
Status: DISCONTINUED | OUTPATIENT
Start: 2024-02-17 | End: 2024-02-18

## 2024-02-17 RX ORDER — MAGNESIUM SULFATE HEPTAHYDRATE 40 MG/ML
2 INJECTION, SOLUTION INTRAVENOUS
Status: DISCONTINUED | OUTPATIENT
Start: 2024-02-17 | End: 2024-02-18

## 2024-02-17 RX ADMIN — DRONABINOL 5 MG: 2.5 CAPSULE ORAL at 06:02

## 2024-02-17 RX ADMIN — POTASSIUM BICARBONATE 50 MEQ: 978 TABLET, EFFERVESCENT ORAL at 11:02

## 2024-02-17 RX ADMIN — SODIUM CHLORIDE: 9 INJECTION, SOLUTION INTRAVENOUS at 06:02

## 2024-02-17 RX ADMIN — ACETAMINOPHEN 650 MG: 325 TABLET ORAL at 11:02

## 2024-02-17 RX ADMIN — HEPARIN SODIUM 5000 UNITS: 5000 INJECTION INTRAVENOUS; SUBCUTANEOUS at 06:02

## 2024-02-17 RX ADMIN — DRONABINOL 5 MG: 2.5 CAPSULE ORAL at 03:02

## 2024-02-17 RX ADMIN — VENLAFAXINE 37.5 MG: 37.5 TABLET ORAL at 08:02

## 2024-02-17 RX ADMIN — AMIODARONE HYDROCHLORIDE 400 MG: 200 TABLET ORAL at 08:02

## 2024-02-17 RX ADMIN — HEPARIN SODIUM 5000 UNITS: 5000 INJECTION INTRAVENOUS; SUBCUTANEOUS at 09:02

## 2024-02-17 RX ADMIN — PANTOPRAZOLE SODIUM 40 MG: 40 TABLET, DELAYED RELEASE ORAL at 06:02

## 2024-02-17 RX ADMIN — SODIUM PHOSPHATE, MONOBASIC, MONOHYDRATE AND SODIUM PHOSPHATE, DIBASIC, ANHYDROUS 39.99 MMOL: 142; 276 INJECTION, SOLUTION INTRAVENOUS at 01:02

## 2024-02-17 RX ADMIN — LORAZEPAM 0.5 MG: 0.5 TABLET ORAL at 03:02

## 2024-02-17 RX ADMIN — VANCOMYCIN HYDROCHLORIDE 1250 MG: 1.25 INJECTION, POWDER, LYOPHILIZED, FOR SOLUTION INTRAVENOUS at 08:02

## 2024-02-17 RX ADMIN — MAGNESIUM SULFATE HEPTAHYDRATE 2 G: 40 INJECTION, SOLUTION INTRAVENOUS at 11:02

## 2024-02-17 RX ADMIN — PANTOPRAZOLE SODIUM 40 MG: 40 TABLET, DELAYED RELEASE ORAL at 03:02

## 2024-02-17 RX ADMIN — ATORVASTATIN CALCIUM 40 MG: 40 TABLET, FILM COATED ORAL at 09:02

## 2024-02-17 RX ADMIN — Medication 324 MG: at 08:02

## 2024-02-17 RX ADMIN — PIPERACILLIN SODIUM AND TAZOBACTAM SODIUM 4.5 G: 4; .5 INJECTION, POWDER, FOR SOLUTION INTRAVENOUS at 02:02

## 2024-02-17 RX ADMIN — SODIUM PHOSPHATE, MONOBASIC, MONOHYDRATE AND SODIUM PHOSPHATE, DIBASIC, ANHYDROUS 39.99 MMOL: 142; 276 INJECTION, SOLUTION INTRAVENOUS at 05:02

## 2024-02-17 RX ADMIN — INSULIN ASPART 2 UNITS: 100 INJECTION, SOLUTION INTRAVENOUS; SUBCUTANEOUS at 09:02

## 2024-02-17 RX ADMIN — INSULIN ASPART 2 UNITS: 100 INJECTION, SOLUTION INTRAVENOUS; SUBCUTANEOUS at 05:02

## 2024-02-17 RX ADMIN — PIPERACILLIN SODIUM AND TAZOBACTAM SODIUM 4.5 G: 4; .5 INJECTION, POWDER, FOR SOLUTION INTRAVENOUS at 01:02

## 2024-02-17 RX ADMIN — MAGNESIUM SULFATE HEPTAHYDRATE 2 G: 40 INJECTION, SOLUTION INTRAVENOUS at 12:02

## 2024-02-17 RX ADMIN — HEPARIN SODIUM 5000 UNITS: 5000 INJECTION INTRAVENOUS; SUBCUTANEOUS at 01:02

## 2024-02-17 RX ADMIN — POTASSIUM BICARBONATE 50 MEQ: 978 TABLET, EFFERVESCENT ORAL at 12:02

## 2024-02-17 RX ADMIN — INSULIN ASPART 4 UNITS: 100 INJECTION, SOLUTION INTRAVENOUS; SUBCUTANEOUS at 12:02

## 2024-02-17 RX ADMIN — SODIUM CHLORIDE: 9 INJECTION, SOLUTION INTRAVENOUS at 12:02

## 2024-02-17 RX ADMIN — AMIODARONE HYDROCHLORIDE 400 MG: 200 TABLET ORAL at 09:02

## 2024-02-17 NOTE — SUBJECTIVE & OBJECTIVE
Interval History/Significant Events:  Doing better this morning.  Tolerated SLED overnight off norepinephrine.  Weaned down from Airvo to HFNC at 12 L/min.  More fatigued this morning.  Discussed with  and sister at bedside.    Review of Systems   Respiratory:  Positive for shortness of breath.    Cardiovascular:  Negative for chest pain.   All other systems reviewed and are negative.    Objective:     Vital Signs (Most Recent):  Temp: 98.3 °F (36.8 °C) (02/17/24 0715)  Pulse: 84 (02/17/24 0845)  Resp: (!) 26 (02/17/24 0845)  BP: (!) 114/49 (02/17/24 0845)  SpO2: 100 % (02/17/24 0845) Vital Signs (24h Range):  Temp:  [97.7 °F (36.5 °C)-98.5 °F (36.9 °C)] 98.3 °F (36.8 °C)  Pulse:  [79-91] 84  Resp:  [13-53] 26  SpO2:  [64 %-100 %] 100 %  BP: ()/(46-84) 114/49   Weight: 67.6 kg (149 lb)  Body mass index is 24.79 kg/m².      Intake/Output Summary (Last 24 hours) at 2/17/2024 0903  Last data filed at 2/17/2024 0800  Gross per 24 hour   Intake 3711.99 ml   Output 7339 ml   Net -3627.01 ml            Physical Exam  Vitals and nursing note reviewed.   Constitutional:       General: She is not in acute distress.     Appearance: She is normal weight. She is not ill-appearing, toxic-appearing or diaphoretic.   HENT:      Head: Normocephalic and atraumatic.      Right Ear: External ear normal.      Left Ear: External ear normal.      Nose: Nose normal.   Cardiovascular:      Rate and Rhythm: Tachycardia present. Rhythm irregular.      Pulses: Normal pulses.      Heart sounds: Normal heart sounds. No murmur heard.     No friction rub. No gallop.   Pulmonary:      Comments: Moderately increased respiratory effort with bilateral crackles  Abdominal:      General: Abdomen is flat. Bowel sounds are normal. There is no distension.      Palpations: Abdomen is soft.      Tenderness: There is no abdominal tenderness. There is no guarding or rebound.   Musculoskeletal:         General: Swelling present. No tenderness.  Normal range of motion.   Skin:     General: Skin is warm and dry.      Coloration: Skin is not jaundiced or pale.   Neurological:      General: No focal deficit present.      Mental Status: She is alert and oriented to person, place, and time. Mental status is at baseline.   Psychiatric:         Mood and Affect: Mood normal.         Behavior: Behavior normal.         Thought Content: Thought content normal.         Judgment: Judgment normal.            Vents:  Oxygen Concentration (%): 40 (02/16/24 2200)  Lines/Drains/Airways       Central Venous Catheter Line  Duration             Permacath right subclavian -- days         Hemodialysis Catheter 01/25/23 1501 right internal jugular 387 days              Peripheral Intravenous Line  Duration                  Hemodialysis AV Fistula 09/01/23 0800 Left forearm 169 days         Peripheral IV - Single Lumen 02/16/24 2215 18 G Anterior;Distal;Left Forearm <1 day                  Significant Labs:    CBC/Anemia Profile:  Recent Labs   Lab 02/16/24  0418 02/17/24  0432   WBC 11.49 11.46   HGB 10.3* 8.7*   HCT 31.4* 26.9*    161   MCV 94 94   RDW 23.2* 22.9*          Chemistries:  Recent Labs   Lab 02/16/24  0418 02/16/24  1350 02/16/24  1553 02/16/24  2243 02/17/24  0432   *  128*   < > 129* 128* 127*  127*   K 4.3  4.3   < > 4.0 3.8 4.3  4.3   CL 96  96   < > 96 97 95  95   CO2 19*  19*   < > 22* 22* 22*  22*   BUN 3*  3*   < > 5* 4* <3*  <3*   CREATININE 0.5  0.5   < > 0.6 0.4* 0.4*  0.4*   CALCIUM 7.8*  7.8*   < > 7.7* 7.5* 7.3*  7.3*   ALBUMIN 2.0*  2.0*   < > 2.0* 1.9* 2.0*  2.0*   PROT 6.0  --   --   --  5.5*   BILITOT 0.5  --   --   --  0.4   ALKPHOS 143*  --   --   --  119   *  --   --   --  113*   *  --   --   --  85*   MG 2.0  2.0   < > 1.8 1.6 1.9  1.9   PHOS 1.4*  1.4*   < > 2.3* <1.0* 3.1  3.1    < > = values in this interval not displayed.         All pertinent labs within the past 24 hours have been  reviewed.    Significant Imaging:  I have reviewed all pertinent imaging results/findings within the past 24 hours.

## 2024-02-17 NOTE — ASSESSMENT & PLAN NOTE
Palliative medicine consulted for goals of care care, advanced care planning with focus on resuscitation status as discussed with primary team NP for Mrs. Green a 72 yo lady admitted to critical care with hypoxic respiratory failure with increased work of breathing.  At time of this encounter Mrs. Green is awake and more alert, does not participate in conversation.  CRRT in iuse , no behavioral signs of pain.   Supplemental oxygen in use     Advance Care Planning     Date: 02/16/2024  - No ACP documents received   - ACP education provided   - Patient did not wish or was not able to name a surrogate decision maker or provide an Advance Care Plan.  -  Deric Green is next of kin for medical decisions [phone]   -  continues to discuss resuscitation status with family   - full code per primary team.      Advance Care Planning     Date: 02/16/2024    Goals of care   - Pal med APRN and MARILYN Alves, GOODW returned to bedside.   at bedside.   -  , Deric, reports he has been reading the Hard Choices and feels more comfortable discussing with wife.   - No decisions at this time.  Deric will discuss with family, niece and nephew. Niece Julianna is at beside now.    - When asked Deric states feeling sure he could make these decisions if needed.   - intense emotional support provided.  Palliative medicine remains available to patient and family   -  The family endorses that what is most important right now is to focus on extending life as long as possible, even it it means sacrificing quality and curative/life-prolongation (regardless of treatment burdens)        Symptom Management     Pain   - no c/o pain  - no pain meds given     Dyspnea   - shortness of breath on exertion noted  - supplemental oxygen in use   - oxygen sat 98 - 100%  - patient has fan at bedside     Hyponatremia Acute hypoxic respiratory failure/ESRD on CKD/  new diagnosis CHF/ afib with RVR  - as per primary team and  specialty consults   - Na level remains low despite aggressive fluid removal   - supplemental oxygen in use - see dyspnea above   - bilateral pleural effusion antibiotics continued   - CRRT in progress  - amiodarone drip   - pal med following for goals of care     Plan  - continue current plan of care  - patient and family would benefit from discussions regarding prognosis in order to develop goals of care   - pal med will continue to follow and provide additional guidance for advanced care planning and completion of acp documents    Primary team, Dr. Trujillo, notified of the above.

## 2024-02-17 NOTE — PROGRESS NOTES
[facility]  Critical Care Medicine  Progress Note    Patient Name: Lily Green  MRN: [id number]  Admission Date: 2/12/2024  Hospital Length of Stay: 5 days  Code Status: Full Code  Attending Provider: Meghan Trujillo MD  Primary Care Provider: Pavel Calixto MD   Principal Problem: Acute hypoxemic respiratory failure    Subjective:     HPI:  Ms. Green is a 73 y.o. 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 She was last admitted from 1/2/24-2/5/24 (35 days) on the thoracic surgery service during which she underwent a right lower lobectomy for resection of her carcinoid tumor and MLND, ultimately requiring a thoracotomy. Post-op, she received a right sided chest tube. 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 Subsequent GIB and discovery of nonbleeding gastric ulcers on EGD made full anticoagulation high risk, so it was stopped. She was ultimately Dc'd to [facility] in [place] where she presents from now.     She presents today from SNF with increased work of breathing, dyspnea, and hypoxia, with oxygen saturation in the 70% on 2L. She became acutely short of breath today and was brought in by EMS after being put on CPAP in the field with some improvement. Placed on BIPAP in the ED. She continues to be tachycardic and tachypnic. She last completed a full session of HD on Saturday. She reports cough and nausea, but denies fever, chills, pain, chest pain, neck pain. She has little residual renal function . Unclear if she has been taking her Torsemide listed as her home med. Her workup was significant for BNP of 2754, Trop 0.312. VBG reveals pH 7.438/38.9/29. EKG reveals sinus tachycardia. CXR has evident pleural effusions and possible multifocal pneumonia. Nephro was consulted  and emergent HD orders were placed with goal of removing 3 L urgently. She will be admitted to MICU for hypoxic respiratory failure and increased work of breathing. CTA PE study is still pending at time of evaluation since patient is getting HD.     Hospital/ICU Course:  2/12:  Patient admitted for hypoxic respiratory failure.  Echocardiogram revealed new low LVEF with elevated troponins.  Cardiology consulted.  2/13:  Patient developed AFib with RVR this morning--started amiodarone infusion.  Restarted on SLED overnight.  CT findings with a couple fluid collections in the right paraesophageal space and azygoesophageal recess.  2/14:  CT Surgery consulted and recommended IR consultation for evaluation for drainage; unfortunately, no safe window for approach.  Interventional Cardiology deferring angiography due to respiratory status.  Palliative Care consulted.  10 hours SLED overnight with removal of 2.1 L. A couple episodes of RVR overnight--increasing metoprolol this morning.  2/15:  Clotted off on CRRT overnight and restarted.  Thoracentesis performed with removal of 1.45L--sent for analysis.  Developed re-expansion pulmonary edema and started on Airvo.  2/16:  Airvo weaned down to 50L40% this morning.  Transudative effusion on analysis, remaining studies pending.  Started on vasopressor support overnight to support volume removal with CRRT.  2/17:  Weaned off Airvo overnight, now on HFNC at 12 L/min.  On SLED overnight with a couple episodes of clotting off.  Tolerated SLED off norepinephrine.      Interval History/Significant Events:  Doing better this morning.  Tolerated SLED overnight off norepinephrine.  Weaned down from Airvo to HFNC at 12 L/min.  More fatigued this morning.  Discussed with  and sister at bedside.    Review of Systems   Respiratory:  Positive for shortness of breath.    Cardiovascular:  Negative for chest pain.   All other systems reviewed and are negative.    Objective:     Vital  Signs (Most Recent):  Temp: 98.3 °F (36.8 °C) (02/17/24 0715)  Pulse: 84 (02/17/24 0845)  Resp: (!) 26 (02/17/24 0845)  BP: (!) 114/49 (02/17/24 0845)  SpO2: 100 % (02/17/24 0845) Vital Signs (24h Range):  Temp:  [97.7 °F (36.5 °C)-98.5 °F (36.9 °C)] 98.3 °F (36.8 °C)  Pulse:  [79-91] 84  Resp:  [13-53] 26  SpO2:  [64 %-100 %] 100 %  BP: ()/(46-84) 114/49   Weight: 67.6 kg (149 lb)  Body mass index is 24.79 kg/m².      Intake/Output Summary (Last 24 hours) at 2/17/2024 0903  Last data filed at 2/17/2024 0800  Gross per 24 hour   Intake 3711.99 ml   Output 7339 ml   Net -3627.01 ml            Physical Exam  Vitals and nursing note reviewed.   Constitutional:       General: She is not in acute distress.     Appearance: She is normal weight. She is not ill-appearing, toxic-appearing or diaphoretic.   HENT:      Head: Normocephalic and atraumatic.      Right Ear: External ear normal.      Left Ear: External ear normal.      Nose: Nose normal.   Cardiovascular:      Rate and Rhythm: Tachycardia present. Rhythm irregular.      Pulses: Normal pulses.      Heart sounds: Normal heart sounds. No murmur heard.     No friction rub. No gallop.   Pulmonary:      Comments: Moderately increased respiratory effort with bilateral crackles  Abdominal:      General: Abdomen is flat. Bowel sounds are normal. There is no distension.      Palpations: Abdomen is soft.      Tenderness: There is no abdominal tenderness. There is no guarding or rebound.   Musculoskeletal:         General: Swelling present. No tenderness. Normal range of motion.   Skin:     General: Skin is warm and dry.      Coloration: Skin is not jaundiced or pale.   Neurological:      General: No focal deficit present.      Mental Status: She is alert and oriented to person, place, and time. Mental status is at baseline.   Psychiatric:         Mood and Affect: Mood normal.         Behavior: Behavior normal.         Thought Content: Thought content normal.          Judgment: Judgment normal.            Vents:  Oxygen Concentration (%): 40 (02/16/24 2200)  Lines/Drains/Airways       Central Venous Catheter Line  Duration             Permacath right subclavian -- days         Hemodialysis Catheter 01/25/23 1501 right internal jugular 387 days              Peripheral Intravenous Line  Duration                  Hemodialysis AV Fistula 09/01/23 0800 Left forearm 169 days         Peripheral IV - Single Lumen 02/16/24 2215 18 G Anterior;Distal;Left Forearm <1 day                  Significant Labs:    CBC/Anemia Profile:  Recent Labs   Lab 02/16/24 0418 02/17/24  0432   WBC 11.49 11.46   HGB 10.3* 8.7*   HCT 31.4* 26.9*    161   MCV 94 94   RDW 23.2* 22.9*          Chemistries:  Recent Labs   Lab 02/16/24  0418 02/16/24  1350 02/16/24  1553 02/16/24  2243 02/17/24  0432   *  128*   < > 129* 128* 127*  127*   K 4.3  4.3   < > 4.0 3.8 4.3  4.3   CL 96  96   < > 96 97 95  95   CO2 19*  19*   < > 22* 22* 22*  22*   BUN 3*  3*   < > 5* 4* <3*  <3*   CREATININE 0.5  0.5   < > 0.6 0.4* 0.4*  0.4*   CALCIUM 7.8*  7.8*   < > 7.7* 7.5* 7.3*  7.3*   ALBUMIN 2.0*  2.0*   < > 2.0* 1.9* 2.0*  2.0*   PROT 6.0  --   --   --  5.5*   BILITOT 0.5  --   --   --  0.4   ALKPHOS 143*  --   --   --  119   *  --   --   --  113*   *  --   --   --  85*   MG 2.0  2.0   < > 1.8 1.6 1.9  1.9   PHOS 1.4*  1.4*   < > 2.3* <1.0* 3.1  3.1    < > = values in this interval not displayed.         All pertinent labs within the past 24 hours have been reviewed.    Significant Imaging:  I have reviewed all pertinent imaging results/findings within the past 24 hours.    ABG  Recent Labs   Lab 02/13/24  0956 02/14/24  0826   PH 7.399  --    PO2 35* 34*   PCO2 36.8  --    HCO3 22.8*  --    BE -2  --      Assessment/Plan:     Psychiatric  Generalized anxiety disorder  - Continue home venlafaxine    Pulmonary  * Acute hypoxemic respiratory failure  Patient with Hypoxic Respiratory  failure which is Acute on chronic.  she is on home oxygen at 2 LPM. Supplemental oxygen was provided and noted- Oxygen Concentration (%):  [40] 40    .   Signs/symptoms of respiratory failure include- tachypnea, increased work of breathing, respiratory distress, and use of accessory muscles. Contributing diagnoses includes - Pleural effusion Labs and images were reviewed. Patient Has recent ABG, which has been reviewed. Will treat underlying causes and adjust management of respiratory failure as follows-     Patient without CHF on most recent Echo (01/12/24) which demonstrated EF 60-65%, no significant valvulopathy, borderline pulm HTN. On torsemide at baseline. Patient reports having some high sodium foods lately. High suspicion for hypervolemia given BNP elevation, though this may be confounded by CKD.     - Appreciated emergent HD by Nephro for volume management  - Admitted on BiPAP but weaned to nasal cannula 5 liters/minute after HD  - Repeat echocardiogram with new LVEF 15-20% with concerns for hypoperfusion  - Cardiology consulted  - Interventional Cardiology deferring coronary angiography due to respiratory status    Pleural effusion on left  On Cefpodoxime and Flagyl until planned end date of 02/15/24 per ID recs. Given recent hospitalization and re-admission will start broad spectrum Abx.     - f/u Bcx, de-escalate pending cultures  - thoracentesis 2/15 with removal of 1.4L of clear yellow fluid  - transudative on analysis   - agree with ID: discontinue antibiotics 2/17    Cardiac/Vascular  New onset of congestive heart failure  Patient is identified as having Systolic (HFrEF) heart failure that is Acute. CHF is currently uncontrolled due to Rales/crackles on pulmonary exam and Pulmonary edema/pleural effusion on CXR. Latest ECHO performed and demonstrates- Results for orders placed during the hospital encounter of 02/12/24    Echo    Interpretation Summary    Left Ventricle: The left ventricle is normal  in size. Normal wall thickness. Regional wall motion abnormalities present. See diagram for wall motion findings. There is severely reduced systolic function with a visually estimated ejection fraction of 15 - 20%. Ejection fraction by visual approximation is 15%. Unable to assess diastolic function due to atrial fibrillation.    Right Ventricle: Normal right ventricular cavity size. Wall thickness is normal. Right ventricle wall motion  is normal. Systolic function is normal.    Left Atrium: Left atrium is severely dilated.    Right Atrium: Right atrium is mildly dilated.    Aortic Valve: There is moderate aortic valve sclerosis. Mildly restricted motion.    Mitral Valve: There is moderate to severe regurgitation.    Tricuspid Valve: There is mild regurgitation.    IVC/SVC: Intermediate venous pressure at 8 mmHg.    Pericardium: There is a trivial effusion. Left pleural effusion.  . Continue Beta Blocker, ACE/ARB, and ARNI and monitor clinical status closely. Monitor on telemetry. Patient is on CHF pathway.  Monitor strict Is&Os and daily weights.  Place on fluid restriction of 1.5 L. Cardiology has been consulted. Continue to stress to patient importance of self efficacy and  on diet for CHF. Last BNP reviewed- and noted below   Recent Labs   Lab 02/12/24  0115   BNP 2,754*         Atrial fibrillation  Continue home med lopressor. No therapeutic anticoagulation for now given GIB during previous hospitalization    - 2/13:  Developed AFib with RVR and started on amiodarone infusion  - 2/15:  Transitioning to oral amiodarone     Hyperlipidemia, mixed  - Continue home statin    Renal/  Chronic kidney disease-mineral and bone disorder  - Continue home Sevelamer    ESRD (end stage renal disease)  Patient reports making small volumes of urine. Last HD on Saturday     - Nephro consulted for HD needs    Oncology  Anemia in ESRD (end-stage renal disease)  Has anemia of ESRD. Goal Hemoglobin  >10    Endocrine  Moderate malnutrition  Nutrition consulted. Most recent weight and BMI monitored-     Measurements:  Wt Readings from Last 1 Encounters:   02/14/24 67.6 kg (149 lb)   Body mass index is 24.79 kg/m².    Patient has been screened and assessed by RD.    Malnutrition Type:  Context: chronic illness  Level: moderate    Malnutrition Characteristic Summary:  Energy Intake (Malnutrition): less than 75% for greater than or equal to 1 month  Muscle Mass (Malnutrition): mild depletion  Fluid Accumulation (Malnutrition): severe    Interventions/Recommendations (treatment strategy):  1. Continue gluten free diet with texture per SLP  2. modify ONS to Novasource renal TID  3. RD to monitor and follow      Type 2 diabetes mellitus with diabetic polyneuropathy, without long-term current use of insulin  Last A1C 4.9    - LD SSI, add scheduled detemir and aspart if needed    GI  GIB (gastrointestinal bleeding)  - Recent GIB on last admission with GI recommending 8 week course of Protonix s/p EGD with nonbleeding gastric ulcer. PPI end date 02/23/24 and follow up EGD planned at that time. Continue PPI while inpatient.               Critical Care Time: 35 minutes  Critical secondary to Patient has a condition that poses threat to life and bodily function: Severe Respiratory Distress      Critical care was time spent personally by me on the following activities: development of treatment plan with patient or surrogate and bedside caregivers, discussions with consultants, evaluation of patient's response to treatment, examination of patient, ordering and performing treatments and interventions, ordering and review of laboratory studies, ordering and review of radiographic studies, pulse oximetry, re-evaluation of patient's condition. This critical care time did not overlap with that of any other provider or involve time for any procedures.         Meghan Trujillo M.D.  Rapid Response/Critical Care  Department of Pulmonary  Medicine  [facility]        N.B.: Portions of this note was dictated using M*Modal Fluency Direct--there may be voice recognition errors occasionally missed on review.

## 2024-02-17 NOTE — PROGRESS NOTES
I personally saw and examined the patient on SLED which he is currently on for the removal of uremic toxins and volume control.  The patient is tolerating the treatment, see SLED flow sheet for vitals and assessemts. I also reviewed the chart, flow sheets and current medication.   The UFR, BFR, DFR and dialysis bath was adjusted accordingly.    3.3 liter net negative, hemodynamically stable. Stable serum sodium level.     Will change to SCUF and lower UF target, get CXR to monitor pulmonary congestion/effusion.

## 2024-02-17 NOTE — SUBJECTIVE & OBJECTIVE
Interval History: more alert today,  Na remains low, continued aggressive fluid removal, ongoing goc conversations     Past Medical History:   Diagnosis Date    Anxiety     Celiac disease 2018    Celiac disease     Depression     Diabetes mellitus     Family history of breast cancer in mother      at age 68    Hyperlipidemia     Hypertension     Meniere disease     Meniere's disease, unspecified ear     Menopause     Peptic ulcer     Reflux esophagitis     Urinary tract infection     Vaginal infection     Vaginal Pap smear 2014    Pap/Hpv Negative        Past Surgical History:   Procedure Laterality Date    APPENDECTOMY      BREAST SURGERY      Tags    CARPAL TUNNEL RELEASE Bilateral 2017    ,     CLOSURE, COLOSTOMY Left 2023    Procedure: CLOSURE, COLOSTOMY;  Surgeon: Manuel Javier MD;  Location: [facility] OR;  Service: General;  Laterality: Left;    COLONOSCOPY  2018    Normal  ( Juan A)     COLOSTOMY Right 2023    Procedure: CREATION, COLOSTOMY;  Surgeon: Manuel Javier MD;  Location: [facility] OR;  Service: General;  Laterality: Right;    DILATION AND CURETTAGE OF UTERUS  1986    DUPUYTREN CONTRACTURE RELEASE Bilateral 2017    ESOPHAGOGASTRODUODENOSCOPY N/A 2024    Procedure: EGD (ESOPHAGOGASTRODUODENOSCOPY);  Surgeon: Yamilet Walters MD;  Location: [facility] ([address] [place] [address]);  Service: Gastroenterology;  Laterality: N/A;    ESOPHAGOGASTRODUODENOSCOPY N/A 2024    Procedure: EGD (ESOPHAGOGASTRODUODENOSCOPY);  Surgeon: Yamilet Walters MD;  Location: [facility] [address]O [address]  Service: Gastroenterology;  Laterality: N/A;    HYSTERECTOMY      BEAU w/ appy, no BSO     IMPLANTATION OF LEADLESS PACEMAKER N/A 2024    Procedure: INSERTION, CARDIAC PACEMAKER, LEADLESS;  Surgeon: LENIN Frances MD;  Location: [facility] EP LAB;  Service: Cardiology;  Laterality: N/A;  SB, LEADLESS PPM (MICRA), MDT, ANES, EH, CVICU [id number]    INJECTION OF NEUROLYTIC AGENT AROUND LUMBAR  SYMPATHETIC NERVE N/A 1/6/2022    Procedure: BLOCK, LUMBAR SYMPATHETIC;  Surgeon: Souleymane Rizo Jr., MD;  Location: [facility] PAIN MGT;  Service: Pain Management;  Laterality: N/A;  no pacemaker   pt is diabetic     INJECTION OF NEUROLYTIC AGENT AROUND LUMBAR SYMPATHETIC NERVE N/A 8/25/2022    Procedure: BLOCK, LUMBAR SYMPATHETIC;  Surgeon: Souleymane Rizo Jr., MD;  Location: [facility] PAIN MGT;  Service: Pain Management;  Laterality: N/A;  diabetic     INSERTION OF TUNNELED CENTRAL VENOUS HEMODIALYSIS CATHETER Right 1/25/2023    Procedure: INSERTION, CATHETER, HEMODIALYSIS, DUAL LUMEN;  Surgeon: Manuel Javier MD;  Location: [facility] OR;  Service: General;  Laterality: Right;    INSERTION, CATHETER, TUNNELED Left 1/28/2023    Procedure: Insertion,catheter,tunneled;  Surgeon: Watson Fontenot MD;  Location: [facility] OR;  Service: General;  Laterality: Left;    LAPAROTOMY, EXPLORATORY N/A 1/14/2023    Procedure: LAPAROTOMY, EXPLORATORY;  Surgeon: Manuel Javier MD;  Location: [facility] OR;  Service: General;  Laterality: N/A;    LAPAROTOMY, EXPLORATORY N/A 1/16/2023    Procedure: LAPAROTOMY, EXPLORATORY;  Surgeon: Manuel Javier MD;  Location: [facility] OR;  Service: General;  Laterality: N/A;    LYMPHADENECTOMY Right 1/2/2024    Procedure: LYMPHADENECTOMY;  Surgeon: Tan Thomson MD;  Location: [address];  Service: Cardiothoracic;  Laterality: Right;    PACEMAKER, TEMPORARY, TRANSVENOUS, PEDIATRIC Right 1/12/2024    Procedure: Pacemaker, Temporary, Transvenous;  Surgeon: Todd Carlisle MD;  Location: [facility] CATH LAB;  Service: Cardiology;  Laterality: Right;    REMOVAL OF CATHETER Right 1/28/2023    Procedure: REMOVAL, CATHETER;  Surgeon: Watson Fontenot MD;  Location: [facility] OR;  Service: General;  Laterality: Right;    REMOVAL, TUNNELED CATH Right 1/25/2023    Procedure: REMOVAL, TUNNELED CATH;  Surgeon: Manuel Javier MD;  Location: [facility] OR;  Service: General;  Laterality: Right;    ROBOTIC BRONCHOSCOPY  N/A 10/20/2023    Procedure: ROBOTIC BRONCHOSCOPY;  Surgeon: Mayda Monzon MD;  [facility];  Service: Pulmonary;  Laterality: N/A;    SHOULDER SURGERY  2009 & 2010    right rotator cuff    THORACOTOMY Right 1/2/2024    Procedure: THORACOTOMY;  Surgeon: Tan Thomson MD;  Location: [facility] OR [facility] FLR;  Service: Cardiothoracic;  Laterality: Right;    THORACOTOMY, WITH INITIAL THERAPEUTIC WEDGE RESECTION OF LUNG Right 1/2/2024    Procedure: THORACOTOMY, WITH INITIAL THERAPEUTIC WEDGE RESECTION OF LUNG;  Surgeon: Tan Thomson MD;  [facility];  Service: Cardiothoracic;  Laterality: Right;    TONSILLECTOMY      UPPER GASTROINTESTINAL ENDOSCOPY  04/2018       Review of patient's allergies indicates:   Allergen Reactions    Crestor [rosuvastatin] Swelling    Gluten protein        Medications:  Continuous Infusions:   sodium chloride 0.9% 200 mL/hr at 02/16/24 1800    sodium chloride 0.9% Stopped (02/13/24 0126)    NORepinephrine bitartrate-D5W 0.08 mcg/kg/min (02/16/24 1800)     Scheduled Meds:   sodium chloride 0.9%   Intravenous Once    amiodarone  400 mg Oral BID    Followed by    [START ON 2/25/2024] amiodarone  200 mg Oral Daily    atorvastatin  40 mg Oral QHS    droNABinol  5 mg Oral BID AC    ferrous gluconate  324 mg Oral Daily with breakfast    heparin (porcine)  5,000 Units Subcutaneous Q8H    mupirocin   Nasal BID    pantoprazole  40 mg Oral BID AC    [START ON 2/17/2024] piperacillin-tazobactam (Zosyn) IV (PEDS and ADULTS) (extended infusion is not appropriate)  4.5 g Intravenous Q12H    pregabalin  75 mg Oral Every other day    venlafaxine  37.5 mg Oral Daily     PRN Meds:acetaminophen, albuterol-ipratropium, dextrose 10%, dextrose 10%, glucagon (human recombinant), glucose, glucose, insulin aspart U-100, LORazepam, magnesium sulfate IVPB, melatonin, ondansetron, sodium chloride 0.9%, sodium phosphate 20.01 mmol in dextrose 5 % (D5W) 250 mL IVPB, sodium phosphate 30 mmol  in dextrose 5 % (D5W) 250 mL IVPB, sodium phosphate 39.99 mmol in dextrose 5 % (D5W) 250 mL IVPB, Pharmacy to dose Vancomycin consult **AND** vancomycin - pharmacy to dose    Family History       Problem Relation (Age of Onset)    Arthritis Father    Breast cancer Mother, Paternal Aunt    Cancer Mother, Paternal Aunt    Diabetes Paternal Grandfather    Hyperlipidemia Sister    Vision loss Father, Mother, Sister          Tobacco Use    Smoking status: Never    Smokeless tobacco: Never   Substance and Sexual Activity    Alcohol use: No    Drug use: Never    Sexual activity: Not Currently     Partners: Male     Birth control/protection: See Surgical Hx     Comment: :        Review of Systems   Respiratory:  Positive for shortness of breath.    Genitourinary:         Urinary retention    Neurological:  Positive for weakness.     Objective:     Vital Signs (Most Recent):  Temp: 98.3 °F (36.8 °C) (02/16/24 1500)  Pulse: 90 (02/16/24 1800)  Resp: (!) 25 (02/16/24 1800)  BP: (!) 106/58 (02/16/24 1800)  SpO2: 100 % (02/16/24 1800) Vital Signs (24h Range):  Temp:  [97.7 °F (36.5 °C)-98.8 °F (37.1 °C)] 98.3 °F (36.8 °C)  Pulse:  [79-93] 90  Resp:  [19-53] 25  SpO2:  [64 %-100 %] 100 %  BP: ()/(39-73) 106/58     Weight: 67.6 kg (149 lb)  Body mass index is 24.79 kg/m².       Physical Exam  Vitals and nursing note reviewed.   Constitutional:       Appearance: She is ill-appearing.   Cardiovascular:      Rate and Rhythm: Tachycardia present.   Pulmonary:      Comments: Dyspnea, supplemental oxygen   Abdominal:      General: There is no distension.   Genitourinary:     Comments: CRRT in progress   Skin:     General: Skin is warm.   Neurological:      Mental Status: She is alert.        Review of Symptoms      Symptom Assessment (ESAS 0-10 Scale)  Pain:  0  Dyspnea:  0  Anxiety:  0  Nausea:  0  Depression:  0  Anorexia:  0  Fatigue:  0  Insomnia:  0  Restlessness:  0  Agitation:  0 due to Acuity of condition          Pain Assessment in Advanced Demential Scale (PAINAD)   Breathing - Independent of vocalization:  0  Negative vocalization:  0  Facial expression:  1  Body language:  1  Consolability:  0  Total:  2    Performance Status:  60    Living Arrangements:  Lives with spouse    Psychosocial/Cultural:   See Palliative Psychosocial Note: Yes  **Primary  to Follow**  Palliative Care  Consult: Yes    Spiritual:  F - Deepika and Belief:  Jewish   A - Address in Care:   following       Advance Care Planning   Advance Directives:   Living Will: No    LaPOST: No    Do Not Resuscitate Status: No    Medical Power of : No      Decision Making:  Family answered questions and Patient unable to communicate due to disease severity/cognitive impairment  Goals of Care: What is most important right now is to focus on extending life as long as possible, even it it means sacrificing quality, curative/life-prolongation (regardless of treatment burdens). Accordingly, we have decided that the best plan to meet the patient's goals includes continuing with treatment.         Significant Labs: All pertinent labs within the past 24 hours have been reviewed.  CBC:   Recent Labs   Lab 02/16/24  0418   WBC 11.49   HGB 10.3*   HCT 31.4*   MCV 94          BMP:  Recent Labs   Lab 02/16/24  1553   *   *   K 4.0   CL 96   CO2 22*   BUN 5*   CREATININE 0.6   CALCIUM 7.7*   MG 1.8       LFT:  Lab Results   Component Value Date     (H) 02/16/2024    ALKPHOS 143 (H) 02/16/2024    BILITOT 0.5 02/16/2024     Albumin:   Albumin   Date Value Ref Range Status   02/16/2024 2.0 (L) 3.5 - 5.2 g/dL Final     Protein:   Total Protein   Date Value Ref Range Status   02/16/2024 6.0 6.0 - 8.4 g/dL Final     Lactic acid:   Lab Results   Component Value Date    LACTATE 1.8 02/12/2024    LACTATE 1.6 01/05/2024       Significant Imaging: I have reviewed all pertinent imaging results/findings within the  past 24 hours.

## 2024-02-17 NOTE — PROGRESS NOTES
02/17/24 0230   Treatment   Treatment Type SLED   Treatment Status Daily equipment check   Dialysis Machine Number [id number]   Dialyzer Time (hours) 5.34   BVP (Liters) 49 L   Solutions Labeled and Current  Yes   Access Right;Tunneled Cath;IJ   Catheter Dressing Intact  Yes   Alarms Engaged Yes   CRRT Comments daily check done     Ongoing SLED. Orders and machine settings verified. Daily equipment check and maintenance done.

## 2024-02-17 NOTE — PROGRESS NOTES
[facility]  Infectious Disease  Progress Note    Patient Name: Lily Green  MRN: [id number]  Admission Date: 2/12/2024  Length of Stay: 5 days  Attending Physician: Meghan Trujillo MD  Primary Care Provider: Pavel Calixto MD    Isolation Status: No active isolations  Assessment/Plan:      Pulmonary  * Acute hypoxemic respiratory failure  I have reviewed hospital notes from  MICU service and other specialty providers. I have also reviewed CBC, CMP/BMP,  cultures and imaging with my interpretation as documented.     On admission. CXR today with large left pleural effusion. Now on high flow NC. S/p thoracentesis on 2/15. Fluid gram stain without organisms and culture NGTD.  Continue Zosyn and vancomycin.  Monitor vancomycin trough.  Can likely complete a 5 day course of therapy on 2/17 then discontinue and monitor off antibiotics if cultures remain no growth.   Discussed management plan with the staff and/or members from MICU service.      Pleural effusion on left  Seen on CXR. Unclear if infected or uninfected at this time. Fluid collection near esophagus determined to not be due to esophageal leak by CTS. S/P thoracentesis.  Antibiotics as above.  Will follow up cultures.  Other studies per MICU.        Anticipated Disposition: per primary    Thank you for your consult. I will sign off. Please contact us if you have any additional questions.    Rosio Rivera MD  Infectious Disease  [facility] - [facility]    50 minutes of total time spent on the encounter, which includes face to face time and non-face to face time preparing to see the patient (eg, review of tests), obtaining and/or reviewing separately obtained history, documenting clinical information in the electronic or other health record, independently interpreting results (not separately reported) and communicating results to the patient/family/caregiver, or care coordination (not separately reported).      Subjective:     Principal Problem:Acute hypoxemic respiratory failure    HPI: 73 year old female with a history of DM2, hyperlipidemia, and right middle lobe carcinoid tumor s/p open thoracotomy with wedge resection to right middle lobe and lymph node resection on January 2, 2024.  Her post surgical course was complicated by bilateral pleural effusions and respiratory cultures that were positive for  Klebsiella pneumoniae and E coli.  She was on meropenem initially but was later transitioned to ceftriaxone and metronidazole.  She also underwent leadless pacemaker placement on January 16, 2024 during that hospital stay.  After around 35 days in the hospital, she was discharged from the hospital on cefpodoxime and metronidazole around February 6.  She is re-admitted with acute shortness of breath.  Repeat chest imaging shows trace bilateral pleural effusions.  CT also shows some small amount of fluid and gas with inflammatory change in the azygoesphageal recess.  ID is consulted to evaluate the patient.  Interval History: ". 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  She was on meropenem, then transitioned to ceftriaxone and metronidazole.  She was ultimately discharged on cefpodoxime and metronidazole with a plan to complete 4 weeks of antibiotics on February 15.  She is now re-admitted with respiratory distress.  ID consulted to assist with her care.    Small amount of fluid and gas with associated inflammatory changes in the azygoesophageal recess.  Per discussions with CT surgery and IR, no invasive procedures can be performed for this.  CXR on 2/15 with large left pleural effusion. Receiving CRRT and s/p thoracentesis on 2/15. Pleural fluid cultures remain NGTD.    Review of Systems   Unable to perform ROS: Severe respiratory distress      Objective:     Vital Signs (Most Recent):  Temp: 98.2 °F (36.8 °C) (02/17/24 0315)  Pulse: 82 (02/17/24 0600)  Resp: 14 (02/17/24 0600)  BP: (!) 117/48 (02/17/24 0600)  SpO2: 100 % (02/17/24 0600) Vital Signs (24h Range):  Temp:  [97.7 °F (36.5 °C)-98.5 °F (36.9 °C)] 98.2 °F (36.8 °C)  Pulse:  [79-91] 82  Resp:  [13-53] 14  SpO2:  [64 %-100 %] 100 %  BP: ()/(46-84) 117/48     Weight: 67.6 kg (149 lb)  Body mass index is 24.79 kg/m².    Estimated Creatinine Clearance: 112.7 mL/min (A) (based on SCr of 0.4 mg/dL (L)).     Physical Exam  Vitals and nursing note reviewed.   Constitutional:       General: She is sleeping.      Appearance: She is well-developed.   HENT:      Head: Normocephalic and atraumatic.      Right Ear: External ear normal.      Left Ear: External ear normal.   Eyes:      General: No scleral icterus.        Right eye: No discharge.         Left eye: No discharge.      Conjunctiva/sclera: Conjunctivae normal.   Cardiovascular:      Rate and Rhythm: Normal rate and regular rhythm.      Heart sounds: Normal heart sounds. No murmur heard.     No friction rub. No gallop.   Pulmonary:      Effort: Pulmonary effort is normal. Tachypnea present. No respiratory distress.      Breath sounds: No stridor. Examination of the left-middle field reveals rales. Examination of the left-lower field reveals decreased breath sounds. Decreased breath sounds and rales present. No wheezing.   Abdominal:      General: Bowel sounds are normal.      Palpations: Abdomen is soft.   Musculoskeletal:         General: No tenderness.      Right lower leg: No edema.      Left lower leg: No edema.   Skin:     General: Skin is warm and dry.   Neurological:      Mental Status: She is easily aroused.          Significant Labs: BMP:   Recent Labs   Lab 02/17/24  0432   *  142*   *  127*   K 4.3  4.3   CL 95  95   CO2 22*  22*   BUN <3*  <3*   CREATININE 0.4*  0.4*   CALCIUM 7.3*  7.3*   MG 1.9  1.9        CBC:   Recent Labs   Lab 02/16/24  0418 02/17/24  0432   WBC 11.49 11.46   HGB 10.3* 8.7*   HCT 31.4* 26.9*    161       Microbiology Results (last 7 days)       Procedure Component Value Units Date/Time    Blood culture [[id number]] Collected: 02/12/24 0526    Order Status: Completed Specimen: Blood from Peripheral, Upper Arm, Right Updated: 02/17/24 0812     Blood Culture, Routine No growth after 5 days.    Blood culture [[id number]] Collected: 02/12/24 0526    Order Status: Completed Specimen: Blood from Peripheral, Antecubital, Right Updated: 02/17/24 0812     Blood Culture, Routine No growth after 5 days.    Culture, Anaerobe [[id number]] Collected: 02/15/24 1522    Order Status: Completed Specimen: Pleural Fluid Updated: 02/16/24 0954     Anaerobic Culture Culture in progress    Aerobic culture [[id number]] Collected: 02/15/24 1522    Order Status: Completed Specimen: Pleural Fluid Updated: 02/16/24 0726     Aerobic Bacterial Culture No growth    Gram stain [[id number]] Collected: 02/15/24 1522    Order Status: Completed Specimen: Pleural Fluid Updated: 02/15/24 1939     Gram Stain Result Rare WBC's      No organisms seen    KOH prep [[id number]] Collected: 02/15/24 1522    Order Status: Completed Specimen: Pleural Fluid Updated: 02/15/24 1843     KOH Prep No yeast or fungal elements seen    Fungus culture [[id number]] Collected: 02/15/24 1522    Order Status: Sent Specimen: Pleural Fluid Updated: 02/15/24 1551    AFB Culture & Smear [[id number]] Collected: 02/15/24 1522    Order Status: Sent Specimen: Pleural Fluid Updated: 02/15/24 1523            Significant Imaging: I have reviewed all pertinent imaging results/findings within the past 24 hours.

## 2024-02-17 NOTE — PROGRESS NOTES
[facility]  Palliative Medicine  Progress Note    Patient Name: Lily Green  MRN: [id number]  Admission Date: 2/12/2024  Hospital Length of Stay: 4 days  Code Status: Full Code   Attending Provider: Meghan Trujillo MD  Consulting Provider: EMORY Borrego  Primary Care Physician: Pavel Calixto MD  Principal Problem:Acute hypoxemic respiratory failure    Patient information was obtained from patient, spouse/SO, past medical records, and primary team.      Assessment/Plan:     Palliative Care  Palliative care encounter  Palliative medicine consulted for goals of care care, advanced care planning with focus on resuscitation status as discussed with primary team NP for Mrs. Green a 72 yo lady admitted to critical care with hypoxic respiratory failure with increased work of breathing.  At time of this encounter Mrs. Green is awake and more alert, does not participate in conversation.  CRRT in iuse , no behavioral signs of pain.   Supplemental oxygen in use     Advance Care Planning    Date: 02/16/2024  - No ACP documents received   - ACP education provided   - Patient did not wish or was not able to name a surrogate decision maker or provide an Advance Care Plan.  -  Deric Green is next of kin for medical decisions [phone]   -  continues to discuss resuscitation status with family   - full code per primary team.      Advance Care Planning    Date: 02/16/2024    Goals of care   - Pal benigno PLEITEZ and MARILYN Alves LCSW returned to bedside.   at bedside.   -  , Deric, reports he has been reading the Hard Choices and feels more comfortable discussing with wife.   - No decisions at this time.  Deric will discuss with family, niece and nephew. Niece Julianna is at beside now.    - When asked Deric states feeling sure he could make these decisions if needed.   - intense emotional support provided.  Palliative medicine remains available to patient and  family   -  The family endorses that what is most important right now is to focus on extending life as long as possible, even it it means sacrificing quality and curative/life-prolongation (regardless of treatment burdens)        Symptom Management     Pain   - no c/o pain  - no pain meds given     Dyspnea   - shortness of breath on exertion noted  - supplemental oxygen in use   - oxygen sat 98 - 100%  - patient has fan at bedside     Hyponatremia Acute hypoxic respiratory failure/ESRD on CKD/  new diagnosis CHF/ afib with RVR  - as per primary team and specialty consults   - Na level remains low despite aggressive fluid removal   - supplemental oxygen in use - see dyspnea above   - bilateral pleural effusion antibiotics continued   - CRRT in progress  - amiodarone drip   - pal med following for goals of care     Plan  - continue current plan of care  - patient and family would benefit from discussions regarding prognosis in order to develop goals of care   - pal med will continue to follow and provide additional guidance for advanced care planning and completion of acp documents    Primary team, Dr. Trujillo, notified of the above.            I will follow-up with patient. Please contact us if you have any additional questions.    Subjective:     Chief Complaint:   Chief Complaint   Patient presents with    Shortness of Breath     Arrives on CPAP       HPI:   HPI obtained from chart review:      As per H&P Mrs. Green is a 72 yo lady with PMH of:  ESRD (TTS), benign essential tremor, Meniere's disease, HTN, HLD, DM2, GERD, gastric ulcer, Celiac's, IBS  and non-smoker. Recently diagnosed with  RML Carcinoid tumor.     Recently admitted to [facility] 1/2/24-2/5/24 (35 days) on the thoracic surgery service  post  right lower lobectomy for resection of her carcinoid tumor and MLND, ultimately requiring a thoracotomy. Post-op, she received a right sided chest tube. 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 Subsequent GIB and discovery of nonbleeding gastric ulcers on EGD made full anticoagulation high risk, so it was stopped. She was ultimately Dc'd to [facility] in [place] where she presents from now.      She presented to [facility] from [facility]  SNF via EMS  with  c/o increased work of breathing, dyspnea, and hypoxia, with oxygen saturation in the 70% on 2L.  Required CPAP in the field.   Placed on BIPAP in the ED. Remained with tachypnea and tachycardia.  Last HD on Saturday.  Reports c/o cough and nausea,  residual urinary retention.  No c/o  fever, chills, pain, chest pain, neck pain. Unable to determine if patient has been taking home meds  Torsemide as ordered.     ED labs significant for BNP of 2754, Trop 0.312. VBG reveals pH 7.438/38.9/29.   EKG reveals sinus tachycardia.   CXR has evident pleural effusions and possible multifocal pneumonia.     Nephro was consulted and emergent HD completed. 3 liters removed. Admitted to MICU for hypoxic respiratory failure and increased work of breathing.     CTA PE Impression:     1. No convincing evidence of acute pulmonary embolism.  2. Bilateral pleural effusions, pericardial effusion, diffuse body wall edema, and suggested pulmonary vascular congestion/interstitial edema.  Correlation with patient volume status recommended.  3. There remains soft tissue fullness in the lower right paraesophageal soft tissues measures on the order of 24 x 30 mm transaxial dimension, relatively similar to slightly decreased in fullness when compared to the 01/13/2024 chest CT. Persistent inflammatory change related to previous identified as azygoesophageal recess gas containing collection identified on the 01/08/2024 abdomen and pelvis CT to be considered. Attention on follow-up would be recommended.  4. Additional details, as provided in the body of the report.     Pal med consulted for goals of care and advanced care  planning              Hospital Course:  No notes on file    Interval History: more alert today,  Na remains low, continued aggressive fluid removal, ongoing goc conversations     Past Medical History:   Diagnosis Date    Anxiety     Celiac disease 2018    Celiac disease     Depression     Diabetes mellitus     Family history of breast cancer in mother      at age 68    Hyperlipidemia     Hypertension     Meniere disease     Meniere's disease, unspecified ear     Menopause     Peptic ulcer     Reflux esophagitis     Urinary tract infection     Vaginal infection     Vaginal Pap smear 2014    Pap/Hpv Negative        Past Surgical History:   Procedure Laterality Date    APPENDECTOMY      BREAST SURGERY      Tags    CARPAL TUNNEL RELEASE Bilateral 2017    ,     CLOSURE, COLOSTOMY Left 2023    Procedure: CLOSURE, COLOSTOMY;  Surgeon: Manuel Javier MD;  Location: [facility] OR;  Service: General;  Laterality: Left;    COLONOSCOPY  2018    Normal  ([place])     COLOSTOMY Right 2023    Procedure: CREATION, COLOSTOMY;  Surgeon: Manuel Javier MD;  Location: [facility] OR;  Service: General;  Laterality: Right;    DILATION AND CURETTAGE OF UTERUS      DUPUYTREN CONTRACTURE RELEASE Bilateral 2017    ESOPHAGOGASTRODUODENOSCOPY N/A 2024    Procedure: EGD (ESOPHAGOGASTRODUODENOSCOPY);  Surgeon: Yamilet Walters MD;  [facility]  Service: Gastroenterology;  Laterality: N/A;    ESOPHAGOGASTRODUODENOSCOPY N/A 2024    Procedure: EGD (ESOPHAGOGASTRODUODENOSCOPY);  Surgeon: Yamilet Walters MD;  [facility]  Service: Gastroenterology;  Laterality: N/A;    HYSTERECTOMY      BEAU w/ appy, no BSO     IMPLANTATION OF LEADLESS PACEMAKER N/A 2024    Procedure: INSERTION, CARDIAC PACEMAKER, LEADLESS;  Surgeon: LENIN Frances MD;  Location: [facility] EP LAB;  Service: Cardiology;  Laterality: N/A;  SB, LEADLESS PPM (MICRA), SAM, ANES, EH, CVICU  [id number]    INJECTION OF NEUROLYTIC AGENT AROUND LUMBAR SYMPATHETIC NERVE N/A 1/6/2022    Procedure: BLOCK, LUMBAR SYMPATHETIC;  Surgeon: Souleymane Rizo Jr., MD;  Location: [facility] PAIN MGT;  Service: Pain Management;  Laterality: N/A;  no pacemaker   pt is diabetic     INJECTION OF NEUROLYTIC AGENT AROUND LUMBAR SYMPATHETIC NERVE N/A 8/25/2022    Procedure: BLOCK, LUMBAR SYMPATHETIC;  Surgeon: Souleymane Rizo Jr., MD;  Location: [facility] PAIN MGT;  Service: Pain Management;  Laterality: N/A;  diabetic     INSERTION OF TUNNELED CENTRAL VENOUS HEMODIALYSIS CATHETER Right 1/25/2023    Procedure: INSERTION, CATHETER, HEMODIALYSIS, DUAL LUMEN;  Surgeon: Manuel Javier MD;  Location: [facility] OR;  Service: General;  Laterality: Right;    INSERTION, CATHETER, TUNNELED Left 1/28/2023    Procedure: Insertion,catheter,tunneled;  Surgeon: Watson Fontenot MD;  Location: [facility] OR;  Service: General;  Laterality: Left;    LAPAROTOMY, EXPLORATORY N/A 1/14/2023    Procedure: LAPAROTOMY, EXPLORATORY;  Surgeon: Manuel Javier MD;  Location: [facility] OR;  Service: General;  Laterality: N/A;    LAPAROTOMY, EXPLORATORY N/A 1/16/2023    Procedure: LAPAROTOMY, EXPLORATORY;  Surgeon: Manuel Javier MD;  Location: [facility] OR;  Service: General;  Laterality: N/A;    LYMPHADENECTOMY Right 1/2/2024    Procedure: LYMPHADENECTOMY;  Surgeon: Tan Thomson MD;  Location: [address];  Service: Cardiothoracic;  Laterality: Right;    PACEMAKER, TEMPORARY, TRANSVENOUS, PEDIATRIC Right 1/12/2024    Procedure: Pacemaker, Temporary, Transvenous;  Surgeon: Todd Carlisle MD;  Location: [facility] CATH LAB;  Service: Cardiology;  Laterality: Right;    REMOVAL OF CATHETER Right 1/28/2023    Procedure: REMOVAL, CATHETER;  Surgeon: Watson Fontenot MD;  Location: [facility] OR;  Service: General;  Laterality: Right;    REMOVAL, TUNNELED CATH Right 1/25/2023    Procedure: REMOVAL, TUNNELED CATH;  Surgeon: Manuel Javier MD;  Location: [facility] OR;  Service:  General;  Laterality: Right;    ROBOTIC BRONCHOSCOPY N/A 10/20/2023    Procedure: ROBOTIC BRONCHOSCOPY;  Surgeon: Mayda Monzon MD;  Location: [facility] OR [address] [place] [address];  Service: Pulmonary;  Laterality: N/A;    SHOULDER SURGERY  2009 & 2010    right rotator cuff    THORACOTOMY Right 1/2/2024    Procedure: THORACOTOMY;  Surgeon: Tan Thomson MD;  [facility];  Service: Cardiothoracic;  Laterality: Right;    THORACOTOMY, WITH INITIAL THERAPEUTIC WEDGE RESECTION OF LUNG Right 1/2/2024    Procedure: THORACOTOMY, WITH INITIAL THERAPEUTIC WEDGE RESECTION OF LUNG;  Surgeon: Tan Thomson MD;  Location: [facility] OR [address] [place] [address];  Service: Cardiothoracic;  Laterality: Right;    TONSILLECTOMY      UPPER GASTROINTESTINAL ENDOSCOPY  04/2018       Review of patient's allergies indicates:   Allergen Reactions    Crestor [rosuvastatin] Swelling    Gluten protein        Medications:  Continuous Infusions:   sodium chloride 0.9% 200 mL/hr at 02/16/24 1800    sodium chloride 0.9% Stopped (02/13/24 0126)    NORepinephrine bitartrate-D5W 0.08 mcg/kg/min (02/16/24 1800)     Scheduled Meds:   sodium chloride 0.9%   Intravenous Once    amiodarone  400 mg Oral BID    Followed by    [START ON 2/25/2024] amiodarone  200 mg Oral Daily    atorvastatin  40 mg Oral QHS    droNABinol  5 mg Oral BID AC    ferrous gluconate  324 mg Oral Daily with breakfast    heparin (porcine)  5,000 Units Subcutaneous Q8H    mupirocin   Nasal BID    pantoprazole  40 mg Oral BID AC    [START ON 2/17/2024] piperacillin-tazobactam (Zosyn) IV (PEDS and ADULTS) (extended infusion is not appropriate)  4.5 g Intravenous Q12H    pregabalin  75 mg Oral Every other day    venlafaxine  37.5 mg Oral Daily     PRN Meds:acetaminophen, albuterol-ipratropium, dextrose 10%, dextrose 10%, glucagon (human recombinant), glucose, glucose, insulin aspart U-100, LORazepam, magnesium sulfate IVPB, melatonin, ondansetron, sodium chloride 0.9%, sodium phosphate 20.01 mmol in  dextrose 5 % (D5W) 250 mL IVPB, sodium phosphate 30 mmol in dextrose 5 % (D5W) 250 mL IVPB, sodium phosphate 39.99 mmol in dextrose 5 % (D5W) 250 mL IVPB, Pharmacy to dose Vancomycin consult **AND** vancomycin - pharmacy to dose    Family History       Problem Relation (Age of Onset)    Arthritis Father    Breast cancer Mother, Paternal Aunt    Cancer Mother, Paternal Aunt    Diabetes Paternal Grandfather    Hyperlipidemia Sister    Vision loss Father, Mother, Sister          Tobacco Use    Smoking status: Never    Smokeless tobacco: Never   Substance and Sexual Activity    Alcohol use: No    Drug use: Never    Sexual activity: Not Currently     Partners: Male     Birth control/protection: See Surgical Hx     Comment: :        Review of Systems   Respiratory:  Positive for shortness of breath.    Genitourinary:         Urinary retention    Neurological:  Positive for weakness.     Objective:     Vital Signs (Most Recent):  Temp: 98.3 °F (36.8 °C) (02/16/24 1500)  Pulse: 90 (02/16/24 1800)  Resp: (!) 25 (02/16/24 1800)  BP: (!) 106/58 (02/16/24 1800)  SpO2: 100 % (02/16/24 1800) Vital Signs (24h Range):  Temp:  [97.7 °F (36.5 °C)-98.8 °F (37.1 °C)] 98.3 °F (36.8 °C)  Pulse:  [79-93] 90  Resp:  [19-53] 25  SpO2:  [64 %-100 %] 100 %  BP: ()/(39-73) 106/58     Weight: 67.6 kg (149 lb)  Body mass index is 24.79 kg/m².       Physical Exam  Vitals and nursing note reviewed.   Constitutional:       Appearance: She is ill-appearing.   Cardiovascular:      Rate and Rhythm: Tachycardia present.   Pulmonary:      Comments: Dyspnea, supplemental oxygen   Abdominal:      General: There is no distension.   Genitourinary:     Comments: CRRT in progress   Skin:     General: Skin is warm.   Neurological:      Mental Status: She is alert.        Review of Symptoms      Symptom Assessment (ESAS 0-10 Scale)  Pain:  0  Dyspnea:  0  Anxiety:  0  Nausea:  0  Depression:  0  Anorexia:  0  Fatigue:  0  Insomnia:   0  Restlessness:  0  Agitation:  0 due to Acuity of condition         Pain Assessment in Advanced Demential Scale (PAINAD)   Breathing - Independent of vocalization:  0  Negative vocalization:  0  Facial expression:  1  Body language:  1  Consolability:  0  Total:  2    Performance Status:  60    Living Arrangements:  Lives with spouse    Psychosocial/Cultural:   See Palliative Psychosocial Note: Yes  **Primary  to Follow**  Palliative Care  Consult: Yes    Spiritual:  F - Deepika and Belief:  Jain   A - Address in Care:   following       Advance Care Planning   Advance Directives:   Living Will: No    LaPOST: No    Do Not Resuscitate Status: No    Medical Power of : No      Decision Making:  Family answered questions and Patient unable to communicate due to disease severity/cognitive impairment  Goals of Care: What is most important right now is to focus on extending life as long as possible, even it it means sacrificing quality, curative/life-prolongation (regardless of treatment burdens). Accordingly, we have decided that the best plan to meet the patient's goals includes continuing with treatment.         Significant Labs: All pertinent labs within the past 24 hours have been reviewed.  CBC:   Recent Labs   Lab 02/16/24  0418   WBC 11.49   HGB 10.3*   HCT 31.4*   MCV 94          BMP:  Recent Labs   Lab 02/16/24  1553   *   *   K 4.0   CL 96   CO2 22*   BUN 5*   CREATININE 0.6   CALCIUM 7.7*   MG 1.8       LFT:  Lab Results   Component Value Date     (H) 02/16/2024    ALKPHOS 143 (H) 02/16/2024    BILITOT 0.5 02/16/2024     Albumin:   Albumin   Date Value Ref Range Status   02/16/2024 2.0 (L) 3.5 - 5.2 g/dL Final     Protein:   Total Protein   Date Value Ref Range Status   02/16/2024 6.0 6.0 - 8.4 g/dL Final     Lactic acid:   Lab Results   Component Value Date    LACTATE 1.8 02/12/2024    LACTATE 1.6 01/05/2024       Significant Imaging: I  have reviewed all pertinent imaging results/findings within the past 24 hours.      Dona Hughes, CNS  Palliative Medicine  [facility]

## 2024-02-17 NOTE — ASSESSMENT & PLAN NOTE
Nutrition consulted. Most recent weight and BMI monitored-     Measurements:  Wt Readings from Last 1 Encounters:   02/14/24 67.6 kg (149 lb)   Body mass index is 24.79 kg/m².    Patient has been screened and assessed by RD.    Malnutrition Type:  Context: chronic illness  Level: moderate    Malnutrition Characteristic Summary:  Energy Intake (Malnutrition): less than 75% for greater than or equal to 1 month  Muscle Mass (Malnutrition): mild depletion  Fluid Accumulation (Malnutrition): severe    Interventions/Recommendations (treatment strategy):  1. Continue gluten free diet with texture per SLP  2. [facility] [facility]  3. RD to monitor and follow

## 2024-02-17 NOTE — CONSULTS
Pharmacokinetic Assessment Follow Up: IV Vancomycin    Vancomycin Regimen Assessment/ Plan:  Random level resulted as 9.1 mcg/mL  Goal level is 10-20 mcg/mL for sepsis  Patient is on continuous SLED  Will dose Vancomycin 1250 mg x 1 today  Next random level due 2/18 with AM labs    Drug levels (last 3 results):  Recent Labs   Lab Result Units 02/16/24 0418 02/17/24  0432   Vancomycin, Random ug/mL 8.8 9.1       Pharmacy will continue to follow and monitor vancomycin.    Please contact pharmacy at extension [phone] for questions regarding this assessment.    Thank you for the consult,   Altagracia Burk, PharmD       Patient brief summary:  Lily Green is a 73 y.o. female initiated on antimicrobial therapy with IV Vancomycin for treatment of sepsis    Drug Allergies:   Review of patient's allergies indicates:   Allergen Reactions    Crestor [rosuvastatin] Swelling    Gluten protein        Actual Body Weight:   67.6 kg    Renal Function:   Estimated Creatinine Clearance: 112.7 mL/min (A) (based on SCr of 0.4 mg/dL (L)).,     Dialysis Method (if applicable):  SLED    CBC (last 72 hours):  Recent Labs   Lab Result Units 02/15/24  0238 02/16/24 0418 02/17/24  0432   WBC K/uL 9.58 11.49 11.46   Hemoglobin g/dL 8.9* 10.3* 8.7*   Hematocrit % 28.8* 31.4* 26.9*   Platelets K/uL 223 230 161   Gran % % 82.4* 80.8* 86.5*   Lymph % % 8.8* 9.5* 6.0*   Mono % % 7.9 8.8 6.6   Eosinophil % % 0.1 0.1 0.0   Basophil % % 0.1 0.1 0.1   Differential Method  Automated Automated Automated       Metabolic Panel (last 72 hours):  Recent Labs   Lab Result Units 02/14/24  2221 02/15/24  0238 02/15/24  1516 02/15/24  2213 02/16/24  0418 02/16/24  1350 02/16/24  1553 02/16/24  2243 02/17/24  0432   Sodium mmol/L 127*  127* 128*  128*  128* 131* 131* 128*  128* 120* 129* 128* 127*  127*   Potassium mmol/L 4.8  4.8 5.2*  5.2*  5.2* 4.5 4.5 4.3  4.3 3.4* 4.0 3.8 4.3  4.3   Chloride mmol/L 99  99 101  101  101 99 100 96  96 90*  96 97 95  95   CO2 mmol/L 17*  17* 14*  14*  14* 21* 19* 19*  19* 20* 22* 22* 22*  22*   Glucose mg/dL 214*  214* 200*  200*  200* 133* 124* 102  102 443* 137* 145* 142*  142*   BUN mg/dL 12  12 16  16  16 6* 6* 3*  3* 3* 5* 4* <3*  <3*   Creatinine mg/dL 1.3  1.3 1.6*  1.6*  1.6* 0.7 0.7 0.5  0.5 0.6 0.6 0.4* 0.4*  0.4*   Albumin g/dL 2.1*  2.1* 2.2*  2.2*  2.2* 2.1* 1.9* 2.0*  2.0* 1.9* 2.0* 1.9* 2.0*  2.0*   Total Bilirubin mg/dL  --  0.3  --   --  0.5  --   --   --  0.4   Alkaline Phosphatase U/L  --  108  --   --  143*  --   --   --  119   AST U/L  --  53*  --   --  219*  --   --   --  85*   ALT U/L  --  28  --   --  161*  --   --   --  113*   Magnesium mg/dL 2.4  2.4 2.4  2.4  2.4 1.8 1.8 2.0  2.0 1.6 1.8 1.6 1.9  1.9   Phosphorus mg/dL 5.2*  5.2* 5.6*  5.6*  5.6* 2.2* 2.0* 1.4*  1.4* 1.7* 2.3* <1.0* 3.1  3.1       Vancomycin Administrations:  vancomycin given in the last 96 hours                     vancomycin 1,250 mg in dextrose 5 % (D5W) 250 mL IVPB (Vial-Mate) (mg) 1,250 mg New Bag 02/16/24 1131    vancomycin (VANCOCIN) 500 mg in dextrose 5 % in water (D5W) 100 mL IVPB (MB+) (mg) 500 mg New Bag 02/15/24 1015    vancomycin (VANCOCIN) 500 mg in dextrose 5 % in water (D5W) 100 mL IVPB (MB+) (mg) 500 mg New Bag 02/15/24 0335    vancomycin (VANCOCIN) 1,000 mg in dextrose 5 % (D5W) 250 mL IVPB (Vial-Mate) (mg) 1,000 mg New Bag 02/13/24 1337                    Microbiologic Results:  Microbiology Results (last 7 days)       Procedure Component Value Units Date/Time    Culture, Anaerobe [[id number]] Collected: 02/15/24 1522    Order Status: Completed Specimen: Pleural Fluid Updated: 02/16/24 0954     Anaerobic Culture Culture in progress    Blood culture [[id number]] Collected: 02/12/24 0526    Order Status: Completed Specimen: Blood from Peripheral, Antecubital, Right Updated: 02/16/24 0812     Blood Culture, Routine No Growth to date      No Growth to date      No Growth to  date      No Growth to date      No Growth to date    Blood culture [[id number]] Collected: 02/12/24 0526    Order Status: Completed Specimen: Blood from Peripheral, Upper Arm, Right Updated: 02/16/24 0812     Blood Culture, Routine No Growth to date      No Growth to date      No Growth to date      No Growth to date      No Growth to date    Aerobic culture [[id number]] Collected: 02/15/24 1522    Order Status: Completed Specimen: Pleural Fluid Updated: 02/16/24 0726     Aerobic Bacterial Culture No growth    Gram stain [[id number]] Collected: 02/15/24 1522    Order Status: Completed Specimen: Pleural Fluid Updated: 02/15/24 1939     Gram Stain Result Rare WBC's      No organisms seen    KOH prep [[id number]] Collected: 02/15/24 1522    Order Status: Completed Specimen: Pleural Fluid Updated: 02/15/24 1843     KOH Prep No yeast or fungal elements seen    Fungus culture [[id number]] Collected: 02/15/24 1522    Order Status: Sent Specimen: Pleural Fluid Updated: 02/15/24 1551    AFB Culture & Smear [[id number]] Collected: 02/15/24 1522    Order Status: Sent Specimen: Pleural Fluid Updated: 02/15/24 1523

## 2024-02-17 NOTE — SUBJECTIVE & OBJECTIVE
Interval History: ". 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  She was on meropenem, then transitioned to ceftriaxone and metronidazole.  She was ultimately discharged on cefpodoxime and metronidazole with a plan to complete 4 weeks of antibiotics on February 15.  She is now re-admitted with respiratory distress.  ID consulted to assist with her care.    Small amount of fluid and gas with associated inflammatory changes in the azygoesophageal recess.  Per discussions with CT surgery and IR, no invasive procedures can be performed for this.  CXR on 2/15 with large left pleural effusion. Receiving CRRT and s/p thoracentesis on 2/15. Pleural fluid cultures remain NGTD.    Review of Systems   Unable to perform ROS: Severe respiratory distress     Objective:     Vital Signs (Most Recent):  Temp: 98.2 °F (36.8 °C) (02/17/24 0315)  Pulse: 82 (02/17/24 0600)  Resp: 14 (02/17/24 0600)  BP: (!) 117/48 (02/17/24 0600)  SpO2: 100 % (02/17/24 0600) Vital Signs (24h Range):  Temp:  [97.7 °F (36.5 °C)-98.5 °F (36.9 °C)] 98.2 °F (36.8 °C)  Pulse:  [79-91] 82  Resp:  [13-53] 14  SpO2:  [64 %-100 %] 100 %  BP: ()/(46-84) 117/48     Weight: 67.6 kg (149 lb)  Body mass index is 24.79 kg/m².    Estimated Creatinine Clearance: 112.7 mL/min (A) (based on SCr of 0.4 mg/dL (L)).     Physical Exam  Vitals and nursing note reviewed.   Constitutional:       General: She is sleeping.      Appearance: She is well-developed.   HENT:      Head: Normocephalic and atraumatic.      Right Ear: External ear normal.      Left Ear: External ear normal.   Eyes:      General: No scleral icterus.        Right eye: No discharge.         Left eye: No discharge.      Conjunctiva/sclera: Conjunctivae normal.   Cardiovascular:      Rate and Rhythm: Normal rate and regular rhythm.       Heart sounds: Normal heart sounds. No murmur heard.     No friction rub. No gallop.   Pulmonary:      Effort: Pulmonary effort is normal. Tachypnea present. No respiratory distress.      Breath sounds: No stridor. Examination of the left-middle field reveals rales. Examination of the left-lower field reveals decreased breath sounds. Decreased breath sounds and rales present. No wheezing.   Abdominal:      General: Bowel sounds are normal.      Palpations: Abdomen is soft.   Musculoskeletal:         General: No tenderness.      Right lower leg: No edema.      Left lower leg: No edema.   Skin:     General: Skin is warm and dry.   Neurological:      Mental Status: She is easily aroused.          Significant Labs: BMP:   Recent Labs   Lab 02/17/24 0432   *  142*   *  127*   K 4.3  4.3   CL 95  95   CO2 22*  22*   BUN <3*  <3*   CREATININE 0.4*  0.4*   CALCIUM 7.3*  7.3*   MG 1.9  1.9       CBC:   Recent Labs   Lab 02/16/24  0418 02/17/24 0432   WBC 11.49 11.46   HGB 10.3* 8.7*   HCT 31.4* 26.9*    161       Microbiology Results (last 7 days)       Procedure Component Value Units Date/Time    Blood culture [[id number]] Collected: 02/12/24 0526    Order Status: Completed Specimen: Blood from Peripheral, Upper Arm, Right Updated: 02/17/24 0812     Blood Culture, Routine No growth after 5 days.    Blood culture [[id number]] Collected: 02/12/24 0526    Order Status: Completed Specimen: Blood from Peripheral, Antecubital, Right Updated: 02/17/24 0812     Blood Culture, Routine No growth after 5 days.    Culture, Anaerobe [[id number]] Collected: 02/15/24 1522    Order Status: Completed Specimen: Pleural Fluid Updated: 02/16/24 0954     Anaerobic Culture Culture in progress    Aerobic culture [[id number]] Collected: 02/15/24 1522    Order Status: Completed Specimen: Pleural Fluid Updated: 02/16/24 0726     Aerobic Bacterial Culture No growth    Gram stain [[id number]] Collected: 02/15/24 1522     Order Status: Completed Specimen: Pleural Fluid Updated: 02/15/24 1939     Gram Stain Result Rare WBC's      No organisms seen    KOH prep [[id number]] Collected: 02/15/24 1522    Order Status: Completed Specimen: Pleural Fluid Updated: 02/15/24 1843     KOH Prep No yeast or fungal elements seen    Fungus culture [[id number]] Collected: 02/15/24 1522    Order Status: Sent Specimen: Pleural Fluid Updated: 02/15/24 1551    AFB Culture & Smear [[id number]] Collected: 02/15/24 1522    Order Status: Sent Specimen: Pleural Fluid Updated: 02/15/24 1523            Significant Imaging: I have reviewed all pertinent imaging results/findings within the past 24 hours.

## 2024-02-17 NOTE — PROGRESS NOTES
Brief Progress Note:    Mrs. Green is a 72 yo lady with ESRD (TTS), benign essential tremor, Meniere's disease, HTN, HLD, DM2, GERD, gastric ulcer, Celiac's, IBS who was found to have RML Carcinoid tumor. She underwent resection of carcinoid tumor and MLND during previous hospitalization from 01/02/24-02/05/2024. Patient presented this admission from SNF with hypoxic respiratory failure.     Palliative Medicine MD asked to follow up over the weekend to continue GOC discussions. Upon entering the room on morning of 02/17/24, this writer introduced herself as Palliative Medicine MD on call. Patient's  stated that they are not going to speak with this writer. When asked about a better time to come back for further discussion, the patient's  stated maybe Monday.     If any urgent needs arise this weekend, please do not hesitate to contact Palliative Medicine team. Will sign out to oncoming team on Monday for follow up discussion with patient and family on Monday.    I spent a total of 30 minutes on the day of the visit.This includes face to face time in discussion of goals of care, symptom assessment, coordination of care and emotional support.  This also includes non-face to face time preparing to see the patient (eg, review of tests/imaging), obtaining and/or reviewing separately obtained history, documenting clinical information in the electronic or other health record, independently interpreting results and communicating results to the patient/family/caregiver, or care coordinator.       Claritza Piper MD  Palliative Medicine

## 2024-02-17 NOTE — PROGRESS NOTES
02/17/24 1139   Treatment   Treatment Type SCUF   Treatment Status Order change   Prescription   Time (Hours) Continuous   Dialysate K + (mEq/L) Other (Comment)   Dialysate CA + (mEq/L) Other (Comment)   Dialysate HCO3 - (Bicarb) (mEq/L) Other (Comment)   Dialysate Na + (mEq/L) Other (comment)   Dialysate Flow Rate (mL/min) 0(seq)   UF Goal Rate 350 mL/hr     Orders changed to continuous SCUF. New UF goal 350 mL/hr. Machine settings updated and verified.

## 2024-02-17 NOTE — PROGRESS NOTES
02/17/24 1607   Treatment   Treatment Type SCUF   Treatment Status Clotting;Blood lost   Dialysis Machine Number [id number]   Dialyzer Time (hours) 19.47   BVP (Liters) 134.8 L   Access Tunneled Cath;Right   Catheter Dressing Intact  Yes   CRRT Comments crrt clotted, blood lost

## 2024-02-17 NOTE — PLAN OF CARE
SICU PLAN OF CARE    Dx: Acute hypoxemic respiratory failure    Goals of Care: Continuous CRRT, MAP>65    Vital Signs (last 12 hours):   Temp:  [98.2 °F (36.8 °C)-98.5 °F (36.9 °C)]   Pulse:  [83-91]   Resp:  [14-35]   BP: ()/(47-84)   SpO2:  [97 %-100 %]      Neuro: AAOx4, Follows commands , and Moves all extremities spontaneously     Cardiac: NSR    Respiratory:  12L High Flow Nasal Cannula      Urine Output: Oliguric 0 mL/shift    Dialysis: SLED, UF Rate: 400/hr    Diet: Cardiac        Labs/Accuchecks: ACHS/ Q8 Renal function/Mag    Skin:  All skin remains free from injury.  Patient turned q2h, Jasper mattress inflated, and bed working correctly.    Shift Events:  Both PIV not working properly at beginning of shift, both IV removed. OK received from NP to insert PIV into left arm. Pt tolerating SLED at UF of 400cc/h while off of levophed. Electrolytes replaced per MAR. See flowsheet for further assessment/details.  Patient/caregiver updated on current condition/plan of care, questions answered, and emotional support provided.  MD updated on current condition, vitals, labs, and gtts.

## 2024-02-17 NOTE — ASSESSMENT & PLAN NOTE
Patient with Hypoxic Respiratory failure which is Acute on chronic.  she is on home oxygen at 2 LPM. Supplemental oxygen was provided and noted- Oxygen Concentration (%):  [40] 40    .   Signs/symptoms of respiratory failure include- tachypnea, increased work of breathing, respiratory distress, and use of accessory muscles. Contributing diagnoses includes - Pleural effusion Labs and images were reviewed. Patient Has recent ABG, which has been reviewed. Will treat underlying causes and adjust management of respiratory failure as follows-     Patient without CHF on most recent Echo (01/12/24) which demonstrated EF 60-65%, no significant valvulopathy, borderline pulm HTN. On torsemide at baseline. Patient reports having some high sodium foods lately. High suspicion for hypervolemia given BNP elevation, though this may be confounded by CKD.     - Appreciated emergent HD by Nephro for volume management  - Admitted on BiPAP but weaned to nasal cannula 5 liters/minute after HD  - Repeat echocardiogram with new LVEF 15-20% with concerns for hypoperfusion  - Cardiology consulted  - Interventional Cardiology deferring coronary angiography due to respiratory status

## 2024-02-17 NOTE — PROGRESS NOTES
02/16/24 1900 02/16/24 2015   Treatment   Treatment Type SLED SLED   Treatment Status Clotting;Blood lost Restart   Dialysis Machine Number [id number]   Dialyzer Time (hours) 22.32 0   BVP (Liters) 173.1 L 0 L   Solutions Labeled and Current  Yes Yes   Access Right;Temporary Cath;IJ Right;Tunneled Cath;IJ   Catheter Dressing Intact  Yes Yes   Alarms Engaged Yes Yes   CRRT Comments dialyzer clotted SLED restarted     SLED restarted per MD orders post clotting of dialyzer. VS stable to start tx. Starting UF set at 300 with a goal of 400. Report given to primary nurse.

## 2024-02-18 LAB
ALBUMIN SERPL BCP-MCNC: 1.9 G/DL (ref 3.5–5.2)
ALBUMIN SERPL BCP-MCNC: 1.9 G/DL (ref 3.5–5.2)
ALP SERPL-CCNC: 102 U/L (ref 55–135)
ALT SERPL W/O P-5'-P-CCNC: 83 U/L (ref 10–44)
ANION GAP SERPL CALC-SCNC: 9 MMOL/L (ref 8–16)
ANION GAP SERPL CALC-SCNC: 9 MMOL/L (ref 8–16)
AST SERPL-CCNC: 50 U/L (ref 10–40)
BASOPHILS # BLD AUTO: 0 K/UL (ref 0–0.2)
BASOPHILS NFR BLD: 0 % (ref 0–1.9)
BILIRUB SERPL-MCNC: 0.4 MG/DL (ref 0.1–1)
BUN SERPL-MCNC: 13 MG/DL (ref 8–23)
BUN SERPL-MCNC: 13 MG/DL (ref 8–23)
CALCIUM SERPL-MCNC: 7.3 MG/DL (ref 8.7–10.5)
CALCIUM SERPL-MCNC: 7.3 MG/DL (ref 8.7–10.5)
CHLORIDE SERPL-SCNC: 94 MMOL/L (ref 95–110)
CHLORIDE SERPL-SCNC: 94 MMOL/L (ref 95–110)
CO2 SERPL-SCNC: 24 MMOL/L (ref 23–29)
CO2 SERPL-SCNC: 24 MMOL/L (ref 23–29)
CREAT SERPL-MCNC: 1 MG/DL (ref 0.5–1.4)
CREAT SERPL-MCNC: 1 MG/DL (ref 0.5–1.4)
DIFFERENTIAL METHOD BLD: ABNORMAL
EOSINOPHIL # BLD AUTO: 0 K/UL (ref 0–0.5)
EOSINOPHIL NFR BLD: 0.4 % (ref 0–8)
ERYTHROCYTE [DISTWIDTH] IN BLOOD BY AUTOMATED COUNT: 23.2 % (ref 11.5–14.5)
EST. GFR  (NO RACE VARIABLE): 59.5 ML/MIN/1.73 M^2
EST. GFR  (NO RACE VARIABLE): 59.5 ML/MIN/1.73 M^2
GLUCOSE SERPL-MCNC: 171 MG/DL (ref 70–110)
GLUCOSE SERPL-MCNC: 171 MG/DL (ref 70–110)
HCT VFR BLD AUTO: 24.9 % (ref 37–48.5)
HGB BLD-MCNC: 7.9 G/DL (ref 12–16)
IMM GRANULOCYTES # BLD AUTO: 0.11 K/UL (ref 0–0.04)
IMM GRANULOCYTES NFR BLD AUTO: 1.2 % (ref 0–0.5)
LYMPHOCYTES # BLD AUTO: 0.8 K/UL (ref 1–4.8)
LYMPHOCYTES NFR BLD: 8.8 % (ref 18–48)
MAGNESIUM SERPL-MCNC: 2.2 MG/DL (ref 1.6–2.6)
MAGNESIUM SERPL-MCNC: 2.2 MG/DL (ref 1.6–2.6)
MCH RBC QN AUTO: 30.2 PG (ref 27–31)
MCHC RBC AUTO-ENTMCNC: 31.7 G/DL (ref 32–36)
MCV RBC AUTO: 95 FL (ref 82–98)
MONOCYTES # BLD AUTO: 0.8 K/UL (ref 0.3–1)
MONOCYTES NFR BLD: 8.2 % (ref 4–15)
NEUTROPHILS # BLD AUTO: 7.7 K/UL (ref 1.8–7.7)
NEUTROPHILS NFR BLD: 81.4 % (ref 38–73)
NRBC BLD-RTO: 0 /100 WBC
PHOSPHATE SERPL-MCNC: 6.5 MG/DL (ref 2.7–4.5)
PHOSPHATE SERPL-MCNC: 6.5 MG/DL (ref 2.7–4.5)
PLATELET # BLD AUTO: 157 K/UL (ref 150–450)
PMV BLD AUTO: 10.9 FL (ref 9.2–12.9)
POCT GLUCOSE: 149 MG/DL (ref 70–110)
POCT GLUCOSE: 193 MG/DL (ref 70–110)
POCT GLUCOSE: 221 MG/DL (ref 70–110)
POTASSIUM SERPL-SCNC: 4.6 MMOL/L (ref 3.5–5.1)
POTASSIUM SERPL-SCNC: 4.6 MMOL/L (ref 3.5–5.1)
PROT SERPL-MCNC: 5.3 G/DL (ref 6–8.4)
RBC # BLD AUTO: 2.62 M/UL (ref 4–5.4)
SODIUM SERPL-SCNC: 127 MMOL/L (ref 136–145)
SODIUM SERPL-SCNC: 127 MMOL/L (ref 136–145)
VANCOMYCIN SERPL-MCNC: 24.1 UG/ML
WBC # BLD AUTO: 9.44 K/UL (ref 3.9–12.7)

## 2024-02-18 PROCEDURE — 25000003 PHARM REV CODE 250: Performed by: INTERNAL MEDICINE

## 2024-02-18 PROCEDURE — 25000003 PHARM REV CODE 250: Performed by: NURSE PRACTITIONER

## 2024-02-18 PROCEDURE — 25000003 PHARM REV CODE 250

## 2024-02-18 PROCEDURE — 99233 SBSQ HOSP IP/OBS HIGH 50: CPT | Mod: GC,,, | Performed by: INTERNAL MEDICINE

## 2024-02-18 PROCEDURE — 27100171 HC OXYGEN HIGH FLOW UP TO 24 HOURS

## 2024-02-18 PROCEDURE — 94761 N-INVAS EAR/PLS OXIMETRY MLT: CPT

## 2024-02-18 PROCEDURE — 63600175 PHARM REV CODE 636 W HCPCS

## 2024-02-18 PROCEDURE — 99900035 HC TECH TIME PER 15 MIN (STAT)

## 2024-02-18 PROCEDURE — 83735 ASSAY OF MAGNESIUM: CPT | Performed by: INTERNAL MEDICINE

## 2024-02-18 PROCEDURE — 20600001 HC STEP DOWN PRIVATE ROOM

## 2024-02-18 PROCEDURE — 80202 ASSAY OF VANCOMYCIN: CPT | Performed by: INTERNAL MEDICINE

## 2024-02-18 PROCEDURE — 99291 CRITICAL CARE FIRST HOUR: CPT | Mod: ,,, | Performed by: INTERNAL MEDICINE

## 2024-02-18 PROCEDURE — 80053 COMPREHEN METABOLIC PANEL: CPT | Performed by: INTERNAL MEDICINE

## 2024-02-18 PROCEDURE — 84100 ASSAY OF PHOSPHORUS: CPT | Performed by: INTERNAL MEDICINE

## 2024-02-18 PROCEDURE — 85025 COMPLETE CBC W/AUTO DIFF WBC: CPT | Performed by: INTERNAL MEDICINE

## 2024-02-18 RX ORDER — HYDROCODONE BITARTRATE AND ACETAMINOPHEN 500; 5 MG/1; MG/1
TABLET ORAL CONTINUOUS
Status: DISCONTINUED | OUTPATIENT
Start: 2024-02-18 | End: 2024-02-18

## 2024-02-18 RX ORDER — MAGNESIUM SULFATE HEPTAHYDRATE 40 MG/ML
2 INJECTION, SOLUTION INTRAVENOUS
Status: DISCONTINUED | OUTPATIENT
Start: 2024-02-18 | End: 2024-02-18

## 2024-02-18 RX ADMIN — Medication 6 MG: at 09:02

## 2024-02-18 RX ADMIN — ATORVASTATIN CALCIUM 40 MG: 40 TABLET, FILM COATED ORAL at 09:02

## 2024-02-18 RX ADMIN — AMIODARONE HYDROCHLORIDE 400 MG: 200 TABLET ORAL at 09:02

## 2024-02-18 RX ADMIN — HEPARIN SODIUM 5000 UNITS: 5000 INJECTION INTRAVENOUS; SUBCUTANEOUS at 09:02

## 2024-02-18 RX ADMIN — INSULIN ASPART 4 UNITS: 100 INJECTION, SOLUTION INTRAVENOUS; SUBCUTANEOUS at 11:02

## 2024-02-18 RX ADMIN — PANTOPRAZOLE SODIUM 40 MG: 40 TABLET, DELAYED RELEASE ORAL at 04:02

## 2024-02-18 RX ADMIN — PREGABALIN 75 MG: 75 CAPSULE ORAL at 09:02

## 2024-02-18 RX ADMIN — Medication 324 MG: at 06:02

## 2024-02-18 RX ADMIN — DRONABINOL 5 MG: 2.5 CAPSULE ORAL at 06:02

## 2024-02-18 RX ADMIN — ACETAMINOPHEN 650 MG: 325 TABLET ORAL at 10:02

## 2024-02-18 RX ADMIN — HEPARIN SODIUM 5000 UNITS: 5000 INJECTION INTRAVENOUS; SUBCUTANEOUS at 06:02

## 2024-02-18 RX ADMIN — PANTOPRAZOLE SODIUM 40 MG: 40 TABLET, DELAYED RELEASE ORAL at 06:02

## 2024-02-18 RX ADMIN — HEPARIN SODIUM 5000 UNITS: 5000 INJECTION INTRAVENOUS; SUBCUTANEOUS at 02:02

## 2024-02-18 RX ADMIN — VENLAFAXINE 37.5 MG: 37.5 TABLET ORAL at 08:02

## 2024-02-18 RX ADMIN — DRONABINOL 5 MG: 2.5 CAPSULE ORAL at 04:02

## 2024-02-18 RX ADMIN — AMIODARONE HYDROCHLORIDE 400 MG: 200 TABLET ORAL at 08:02

## 2024-02-18 RX ADMIN — INSULIN ASPART 1 UNITS: 100 INJECTION, SOLUTION INTRAVENOUS; SUBCUTANEOUS at 09:02

## 2024-02-18 NOTE — PLAN OF CARE
SICU PLAN OF CARE    Dx: Acute hypoxemic respiratory failure    Goals of Care: MAP>65    Vital Signs (last 12 hours):   Temp:  [97.7 °F (36.5 °C)-98.3 °F (36.8 °C)]   Pulse:  [75-86]   Resp:  [11-44]   BP: ()/(41-68)   SpO2:  [95 %-100 %]      Neuro: AAOx4, Follows commands , and Moves all extremities spontaneously     Cardiac: NSR    Respiratory:  10L High Flow Nasal Cannula    Urine Output: Anuric 0 mL/shift    Diet: Diabetic  BMx1     Labs/Accuchecks: Q8 Renal function/Mag/Phos, Accucheck ACHS    Skin:  All skin remains free from injury.  Patient turned q2h, Jasper mattress inflated, and bed working correctly.    Shift Events:  No acute events overnight. Pt off of CRRT per Nephrology team. BP stable, pt still off levophed.  Electrolytes replaced per MAR. See flowsheet for further assessment/details.  Family updated on current condition/plan of care, questions answered, and emotional support provided.  MD updated on current condition, vitals, labs, and gtts.

## 2024-02-18 NOTE — PROGRESS NOTES
[facility]  Nephrology  Progress Note    Patient Name: Lily Green  MRN: [id number]  Admission Date: 2/12/2024  Hospital Length of Stay: 6 days  Attending Provider: Martin Thornton MD   Primary Care Physician: Pavel Calixto MD  Principal Problem:Acute hypoxemic respiratory failure    Subjective:     HPI: 73 year old female with a histoyr of ESRD(MW), HTN, HLD, T2DM, IBS, RML carcinoid tumor admitted to [facility] for hypoxic respiratory distress. Initially thought to be from volume overload, however emergent HD done overnight resulted in Afib with RVR. She initially presented with hypoxic resp failure requiring NC satting 70% with 2 liters. MICU was called and bipap was started with improvement in respiratory status.     She was previously admitted to [facility] and underwent a right thoracotomy for newly found RML carcinoid tumor. Course was complicated by right sided chest tube placement and new onset afib with RVR and GIB (EGD with gastric ulcers).     Outpatient ESRD  Hemodialysis  Outpatient nephrologist: Dr. Vyas  Outpatient center: [facility]  Dialysis schedule: [facility]  Last treatment: 2/10/24  Anuric: Minimal  Access: right tunnled dialysis cather; has left fistula, pending maturation      Interval History:     No acute events overnight. Labs stable.     Review of patient's allergies indicates:   Allergen Reactions    Crestor [rosuvastatin] Swelling    Gluten protein      Current Facility-Administered Medications   Medication Frequency    0.9%  NaCl infusion (CRRT USE ONLY) Continuous    0.9%  NaCl infusion Once    0.9%  NaCl infusion Continuous    acetaminophen tablet 650 mg Q4H PRN    albuterol-ipratropium 2.5 mg-0.5 mg/3 mL nebulizer solution 3 mL Q4H PRN    amiodarone tablet 400 mg BID    Followed by    [START ON 2/25/2024] amiodarone tablet 200 mg Daily    atorvastatin tablet 40 mg QHS    dextrose 10% bolus 125 mL 125 mL PRN    dextrose 10% bolus 250 mL 250 mL PRN     droNABinol capsule 5 mg BID AC    ferrous gluconate tablet 324 mg Daily with breakfast    glucagon (human recombinant) injection 1 mg PRN    glucose chewable tablet 16 g PRN    glucose chewable tablet 24 g PRN    heparin (porcine) injection 5,000 Units Q8H    insulin aspart U-100 pen 0-10 Units QID (AC + HS) PRN    LORazepam tablet 0.5 mg Q8H PRN    magnesium sulfate 2g in water 50mL IVPB (premix) PRN    melatonin tablet 6 mg Nightly PRN    ondansetron injection 4 mg Q6H PRN    pantoprazole EC tablet 40 mg BID AC    pregabalin capsule 75 mg Every other day    sodium chloride 0.9% flush 10 mL PRN    sodium phosphate 20.01 mmol in dextrose 5 % (D5W) 250 mL IVPB PRN    sodium phosphate 30 mmol in dextrose 5 % (D5W) 250 mL IVPB PRN    sodium phosphate 39.99 mmol in dextrose 5 % (D5W) 250 mL IVPB PRN    venlafaxine tablet 37.5 mg Daily       Objective:     Vital Signs (Most Recent):  Temp: 98.1 °F (36.7 °C) (02/18/24 0715)  Pulse: 81 (02/18/24 0945)  Resp: 14 (02/18/24 0945)  BP: (!) 131/59 (02/18/24 0945)  SpO2: 99 % (02/18/24 0945) Vital Signs (24h Range):  Temp:  [97.7 °F (36.5 °C)-98.3 °F (36.8 °C)] 98.1 °F (36.7 °C)  Pulse:  [75-87] 81  Resp:  [11-49] 14  SpO2:  [92 %-100 %] 99 %  BP: ()/(41-68) 131/59     Weight: 67.6 kg (149 lb) (02/14/24 1245)  Body mass index is 24.79 kg/m².  Body surface area is 1.76 meters squared.    I/O last 3 completed shifts:  In: 4924.8 [I.V.:3976.2; IV Piggyback:948.6]  Out: 7241 [Other:7241]    Physical Exam  Vitals and nursing note reviewed.   Constitutional:       General: She is not in acute distress.     Appearance: She is normal weight. She is not ill-appearing, toxic-appearing or diaphoretic.   HENT:      Head: Normocephalic and atraumatic.      Right Ear: External ear normal.      Left Ear: External ear normal.      Nose: Nose normal.   Cardiovascular:      Rate and Rhythm: Normal rate and regular rhythm.      Pulses: Normal pulses.      Heart sounds: Normal heart sounds.  No murmur heard.     No friction rub. No gallop.   Pulmonary:      Comments: Moderately increased respiratory effort with bilateral crackles  Abdominal:      General: Abdomen is flat. Bowel sounds are normal. There is no distension.      Palpations: Abdomen is soft.      Tenderness: There is no abdominal tenderness. There is no guarding or rebound.   Musculoskeletal:         General: Swelling present. No tenderness. Normal range of motion.   Skin:     General: Skin is warm and dry.      Coloration: Skin is not jaundiced or pale.   Neurological:      General: No focal deficit present.      Mental Status: She is alert and oriented to person, place, and time. Mental status is at baseline.   Psychiatric:         Mood and Affect: Mood normal.         Behavior: Behavior normal.         Thought Content: Thought content normal.         Judgment: Judgment normal.          Significant Labs:  All labs within the past 24 hours have been reviewed.     Significant Imaging:  Labs: Reviewed  Assessment/Plan:     Pulmonary  * Acute hypoxemic respiratory failure  Agree with thora today.   UF with RRT    Renal/  ESRD (end stage renal disease)  ESRD   Hypoxic resp failure - improved with 1.5 liter removal;   ECHO 2/12 with newly depressed EF of 10-15% from 60-65% one month prior    Plan/Recommendation  -Will plan for HD tomorrow.   -Keep MAP > 65  -Keep hemoglobin > 7  -Strict ins and outs  -Avoid nephrotoxic agents if possible and renally dose medications  -Avoid drastic hemodynamic changes if possible    Oncology  Anemia in ESRD (end-stage renal disease)  Goal hemoglobin in ESRD is 10-12  Hemoglobin   Date Value Ref Range Status   02/18/2024 7.9 (L) 12.0 - 16.0 g/dL Final     Iron   Date Value Ref Range Status   02/02/2024 52 30 - 160 ug/dL Final     Transferrin   Date Value Ref Range Status   02/02/2024 121 (L) 200 - 375 mg/dL Final     TIBC   Date Value Ref Range Status   02/02/2024 179 (L) 250 - 450 ug/dL Final     Saturated  Iron   Date Value Ref Range Status   02/02/2024 29 20 - 50 % Final     Ferritin   Date Value Ref Range Status   02/02/2024 1,803 (H) 20.0 - 300.0 ng/mL Final       Endocrine  Secondary hyperparathyroidism  Calcium   Date Value Ref Range Status   02/18/2024 7.3 (L) 8.7 - 10.5 mg/dL Final   02/18/2024 7.3 (L) 8.7 - 10.5 mg/dL Final     Phosphorus   Date Value Ref Range Status   02/18/2024 6.5 (H) 2.7 - 4.5 mg/dL Final   02/18/2024 6.5 (H) 2.7 - 4.5 mg/dL Final     PTH, Intact   Date Value Ref Range Status   02/02/2024 111.8 (H) 9.0 - 77.0 pg/mL Final         Type 2 diabetes mellitus with diabetic polyneuropathy, without long-term current use of insulin  - management per primary team        Thank you for your consult. I will follow-up with patient. Please contact us if you have any additional questions.    Manpreet Turner MD  Nephrology  [facility]

## 2024-02-18 NOTE — CONSULTS
VANCOMYCIN DOSING BY PHARMACY DISCONTINUATION NOTE    Lily Green is a 73 y.o. female who had been consulted for vancomycin dosing.    The pharmacy consult for vancomycin dosing has been discontinued.     Vancomycin Dosing by Pharmacy Consult will sign-off. Please reconsult if necessary. Thank you for allowing us to participate in this patient's care.     Altagracia Burk, PharmD  Ext. [phone]

## 2024-02-18 NOTE — SUBJECTIVE & OBJECTIVE
Interval History:     No acute events overnight. Labs stable.     Review of patient's allergies indicates:   Allergen Reactions    Crestor [rosuvastatin] Swelling    Gluten protein      Current Facility-Administered Medications   Medication Frequency    0.9%  NaCl infusion (CRRT USE ONLY) Continuous    0.9%  NaCl infusion Once    0.9%  NaCl infusion Continuous    acetaminophen tablet 650 mg Q4H PRN    albuterol-ipratropium 2.5 mg-0.5 mg/3 mL nebulizer solution 3 mL Q4H PRN    amiodarone tablet 400 mg BID    Followed by    [START ON 2/25/2024] amiodarone tablet 200 mg Daily    atorvastatin tablet 40 mg QHS    dextrose 10% bolus 125 mL 125 mL PRN    dextrose 10% bolus 250 mL 250 mL PRN    droNABinol capsule 5 mg BID AC    ferrous gluconate tablet 324 mg Daily with breakfast    glucagon (human recombinant) injection 1 mg PRN    glucose chewable tablet 16 g PRN    glucose chewable tablet 24 g PRN    heparin (porcine) injection 5,000 Units Q8H    insulin aspart U-100 pen 0-10 Units QID (AC + HS) PRN    LORazepam tablet 0.5 mg Q8H PRN    magnesium sulfate 2g in water 50mL IVPB (premix) PRN    melatonin tablet 6 mg Nightly PRN    ondansetron injection 4 mg Q6H PRN    pantoprazole EC tablet 40 mg BID AC    pregabalin capsule 75 mg Every other day    sodium chloride 0.9% flush 10 mL PRN    sodium phosphate 20.01 mmol in dextrose 5 % (D5W) 250 mL IVPB PRN    sodium phosphate 30 mmol in dextrose 5 % (D5W) 250 mL IVPB PRN    sodium phosphate 39.99 mmol in dextrose 5 % (D5W) 250 mL IVPB PRN    venlafaxine tablet 37.5 mg Daily       Objective:     Vital Signs (Most Recent):  Temp: 98.1 °F (36.7 °C) (02/18/24 0715)  Pulse: 81 (02/18/24 0945)  Resp: 14 (02/18/24 0945)  BP: (!) 131/59 (02/18/24 0945)  SpO2: 99 % (02/18/24 0945) Vital Signs (24h Range):  Temp:  [97.7 °F (36.5 °C)-98.3 °F (36.8 °C)] 98.1 °F (36.7 °C)  Pulse:  [75-87] 81  Resp:  [11-49] 14  SpO2:  [92 %-100 %] 99 %  BP: ()/(41-68) 131/59     Weight: 67.6 kg (149  lb) (02/14/24 1245)  Body mass index is 24.79 kg/m².  Body surface area is 1.76 meters squared.    I/O last 3 completed shifts:  In: 4924.8 [I.V.:3976.2; IV Piggyback:948.6]  Out: 7241 [Other:7241]     Physical Exam  Vitals and nursing note reviewed.   Constitutional:       General: She is not in acute distress.     Appearance: She is normal weight. She is not ill-appearing, toxic-appearing or diaphoretic.   HENT:      Head: Normocephalic and atraumatic.      Right Ear: External ear normal.      Left Ear: External ear normal.      Nose: Nose normal.   Cardiovascular:      Rate and Rhythm: Normal rate and regular rhythm.      Pulses: Normal pulses.      Heart sounds: Normal heart sounds. No murmur heard.     No friction rub. No gallop.   Pulmonary:      Comments: Moderately increased respiratory effort with bilateral crackles  Abdominal:      General: Abdomen is flat. Bowel sounds are normal. There is no distension.      Palpations: Abdomen is soft.      Tenderness: There is no abdominal tenderness. There is no guarding or rebound.   Musculoskeletal:         General: Swelling present. No tenderness. Normal range of motion.   Skin:     General: Skin is warm and dry.      Coloration: Skin is not jaundiced or pale.   Neurological:      General: No focal deficit present.      Mental Status: She is alert and oriented to person, place, and time. Mental status is at baseline.   Psychiatric:         Mood and Affect: Mood normal.         Behavior: Behavior normal.         Thought Content: Thought content normal.         Judgment: Judgment normal.          Significant Labs:  All labs within the past 24 hours have been reviewed.     Significant Imaging:  Labs: Reviewed

## 2024-02-18 NOTE — PROGRESS NOTES
[facility]  Critical Care Medicine  Progress Note    Patient Name: Lily Green  MRN: [id number]  Admission Date: 2/12/2024  Hospital Length of Stay: 6 days  Code Status: Full Code  Attending Provider: Martin Thornton MD  Primary Care Provider: Pavel Calixto MD   Principal Problem: Acute hypoxemic respiratory failure    Subjective:     HPI:  Ms. Green is a 73 y.o. 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 She was last admitted from 1/2/24-2/5/24 (35 days) on the thoracic surgery service during which she underwent a right lower lobectomy for resection of her carcinoid tumor and MLND, ultimately requiring a thoracotomy. Post-op, she received a right sided chest tube. 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 Subsequent GIB and discovery of nonbleeding gastric ulcers on EGD made full anticoagulation high risk, so it was stopped. She was ultimately Dc'd to [facility] in [place] where she presents from now.     She presents today from SNF with increased work of breathing, dyspnea, and hypoxia, with oxygen saturation in the 70% on 2L. She became acutely short of breath today and was brought in by EMS after being put on CPAP in the field with some improvement. Placed on BIPAP in the ED. She continues to be tachycardic and tachypnic. She last completed a full session of HD on Saturday. She reports cough and nausea, but denies fever, chills, pain, chest pain, neck pain. She has little residual renal function . Unclear if she has been taking her Torsemide listed as her home med. Her workup was significant for BNP of 2754, Trop 0.312. VBG reveals pH 7.438/38.9/29. EKG reveals sinus tachycardia. CXR has evident pleural effusions and possible multifocal pneumonia. Nephro was  consulted and emergent HD orders were placed with goal of removing 3 L urgently. She will be admitted to MICU for hypoxic respiratory failure and increased work of breathing. CTA PE study is still pending at time of evaluation since patient is getting HD.     Hospital/ICU Course:  2/12:  Patient admitted for hypoxic respiratory failure.  Echocardiogram revealed new low LVEF with elevated troponins.  Cardiology consulted.  2/13:  Patient developed AFib with RVR this morning--started amiodarone infusion.  Restarted on SLED overnight.  CT findings with a couple fluid collections in the right paraesophageal space and azygoesophageal recess.  2/14:  CT Surgery consulted and recommended IR consultation for evaluation for drainage; unfortunately, no safe window for approach.  Interventional Cardiology deferring angiography due to respiratory status.  Palliative Care consulted.  10 hours SLED overnight with removal of 2.1 L. A couple episodes of RVR overnight--increasing metoprolol this morning.  2/15:  Clotted off on CRRT overnight and restarted.  Thoracentesis performed with removal of 1.45L--sent for analysis.  Developed re-expansion pulmonary edema and started on Airvo.  2/16:  Airvo weaned down to 50L40% this morning.  Transudative effusion on analysis, remaining studies pending.  Started on vasopressor support overnight to support volume removal with CRRT.  2/17:  Weaned off Airvo overnight, now on HFNC at 12 L/min.  On SLED overnight with a couple episodes of clotting off.  Tolerated SLED off norepinephrine.    2/18: Weaned to 4L this am. Feels weak and tired today    Interval History/Significant Events:  Doing better this morning.  Tolerated SLED overnight off norepinephrine.  Weaned down from Airvo to HFNC at 12 L/min.  More fatigued this morning.  Discussed with  and sister at bedside.    Review of Systems   All other systems reviewed and are negative.    Objective:     Vital Signs (Most Recent):  Temp:  97.6 °F (36.4 °C) (02/18/24 1100)  Pulse: 83 (02/18/24 1245)  Resp: (!) 25 (02/18/24 1245)  BP: 131/63 (02/18/24 1245)  SpO2: 100 % (02/18/24 1245) Vital Signs (24h Range):  Temp:  [97.6 °F (36.4 °C)-98.3 °F (36.8 °C)] 97.6 °F (36.4 °C)  Pulse:  [75-86] 83  Resp:  [11-49] 25  SpO2:  [93 %-100 %] 100 %  BP: ()/(41-68) 131/63   Weight: 67.6 kg (149 lb)  Body mass index is 24.79 kg/m².      Intake/Output Summary (Last 24 hours) at 2/18/2024 1401  Last data filed at 2/18/2024 0200  Gross per 24 hour   Intake 998.69 ml   Output 612 ml   Net 386.69 ml            Physical Exam  Vitals and nursing note reviewed.   Constitutional:       General: She is not in acute distress.     Appearance: She is normal weight. She is not ill-appearing, toxic-appearing or diaphoretic.      Comments: Appears frail   HENT:      Head: Normocephalic and atraumatic.   Cardiovascular:      Rate and Rhythm: Rhythm irregular.      Pulses: Normal pulses.      Heart sounds: Normal heart sounds. No murmur heard.     No friction rub. No gallop.   Pulmonary:      Comments: Moderately increased respiratory effort with bilateral crackles  Abdominal:      General: Abdomen is flat. Bowel sounds are normal. There is no distension.      Palpations: Abdomen is soft.      Tenderness: There is no abdominal tenderness. There is no guarding or rebound.   Musculoskeletal:         General: No swelling or tenderness.   Skin:     General: Skin is warm and dry.      Coloration: Skin is not jaundiced or pale.            Vents:  Oxygen Concentration (%): 40 (02/17/24 2100)  Lines/Drains/Airways       Central Venous Catheter Line  Duration             Permacath right subclavian -- days         Hemodialysis Catheter 01/25/23 1501 right internal jugular 388 days              Peripheral Intravenous Line  Duration                  Hemodialysis AV Fistula 09/01/23 0800 Left forearm 170 days         Peripheral IV - Single Lumen 02/16/24 2215 18 G Anterior;Distal;Left  Forearm 1 day                  Significant Labs:    CBC/Anemia Profile:  Recent Labs   Lab 02/17/24  0432 02/18/24  0403   WBC 11.46 9.44   HGB 8.7* 7.9*   HCT 26.9* 24.9*    157   MCV 94 95   RDW 22.9* 23.2*          Chemistries:  Recent Labs   Lab 02/17/24  0432 02/17/24  1345 02/17/24  2156 02/18/24  0403   *  127* 128* 128* 127*  127*   K 4.3  4.3 4.5 3.6 4.6  4.6   CL 95  95 96 95 94*  94*   CO2 22*  22* 20* 24 24  24   BUN <3*  <3* 4* 12 13  13   CREATININE 0.4*  0.4* 0.6 0.7 1.0  1.0   CALCIUM 7.3*  7.3* 7.5* 7.4* 7.3*  7.3*   ALBUMIN 2.0*  2.0* 1.9* 1.8* 1.9*  1.9*   PROT 5.5*  --   --  5.3*   BILITOT 0.4  --   --  0.4   ALKPHOS 119  --   --  102   *  --   --  83*   AST 85*  --   --  50*   MG 1.9  1.9 1.8 1.7 2.2  2.2   PHOS 3.1  3.1 1.9* 7.2* 6.5*  6.5*         All pertinent labs within the past 24 hours have been reviewed.    Significant Imaging:  I have reviewed all pertinent imaging results/findings within the past 24 hours.    ABG  Recent Labs   Lab 02/13/24  0956 02/14/24  0826   PH 7.399  --    PO2 35* 34*   PCO2 36.8  --    HCO3 22.8*  --    BE -2  --      Assessment/Plan:     Psychiatric  Generalized anxiety disorder  - Continue home venlafaxine    Pulmonary  * Acute hypoxemic respiratory failure  Patient with Hypoxic Respiratory failure which is Acute on chronic.  she is on home oxygen at 2 LPM. Supplemental oxygen was provided and noted- Oxygen Concentration (%):  [40] 40    .   Signs/symptoms of respiratory failure include- tachypnea, increased work of breathing, respiratory distress, and use of accessory muscles. Contributing diagnoses includes - Pleural effusion Labs and images were reviewed. Patient Has recent ABG, which has been reviewed. Will treat underlying causes and adjust management of respiratory failure as follows-     Patient without CHF on most recent Echo (01/12/24) which demonstrated EF 60-65%, no significant valvulopathy, borderline pulm  HTN. On torsemide at baseline. Patient reports having some high sodium foods lately. High suspicion for hypervolemia given BNP elevation, though this may be confounded by CKD.     - Improved w/ volume removal. Down to 4L and will likely be able to come down further today  - Repeat echocardiogram with new LVEF 15-20% with concerns for hypoperfusion  - Cardiology consulted  - Interventional Cardiology deferring coronary angiography due to respiratory status    Pleural effusion on left  On Cefpodoxime and Flagyl until planned end date of 02/15/24 per ID recs. Given recent hospitalization and re-admission will start broad spectrum Abx.     - Cultures negative here  - Completed 7 days of vanc and zosyn      Cardiac/Vascular  New onset of congestive heart failure  Patient is identified as having Systolic (HFrEF) heart failure that is Acute. CHF is currently uncontrolled due to Rales/crackles on pulmonary exam and Pulmonary edema/pleural effusion on CXR. Latest ECHO performed and demonstrates- Results for orders placed during the hospital encounter of 02/12/24    Echo    Interpretation Summary    Left Ventricle: The left ventricle is normal in size. Normal wall thickness. Regional wall motion abnormalities present. See diagram for wall motion findings. There is severely reduced systolic function with a visually estimated ejection fraction of 15 - 20%. Ejection fraction by visual approximation is 15%. Unable to assess diastolic function due to atrial fibrillation.    Right Ventricle: Normal right ventricular cavity size. Wall thickness is normal. Right ventricle wall motion  is normal. Systolic function is normal.    Left Atrium: Left atrium is severely dilated.    Right Atrium: Right atrium is mildly dilated.    Aortic Valve: There is moderate aortic valve sclerosis. Mildly restricted motion.    Mitral Valve: There is moderate to severe regurgitation.    Tricuspid Valve: There is mild regurgitation.    IVC/SVC:  Intermediate venous pressure at 8 mmHg.    Pericardium: There is a trivial effusion. Left pleural effusion.  . Continue Beta Blocker, ACE/ARB, and ARNI and monitor clinical status closely. Monitor on telemetry. Patient is on CHF pathway.  Monitor strict Is&Os and daily weights.  Place on fluid restriction of 1.5 L. Cardiology has been consulted. Continue to stress to patient importance of self efficacy and  on diet for CHF. Last BNP reviewed- and noted below   Recent Labs   Lab 02/12/24  0115   BNP 2,754*       - Rediscuss w/ Cardiology    Atrial fibrillation  Continue home med lopressor. No therapeutic anticoagulation for now given GIB during previous hospitalization    - 2/13:  Developed AFib with RVR and started on amiodarone infusion  - 2/15:  Transitioning to oral amiodarone     Hyperlipidemia, mixed  - Continue home statin    Renal/  Chronic kidney disease-mineral and bone disorder  - Continue home Sevelamer    ESRD (end stage renal disease)  Patient reports making small volumes of urine. Last HD on Saturday     - Nephro consulted for HD needs    Oncology  Anemia in ESRD (end-stage renal disease)  Has anemia of ESRD.     Endocrine  Moderate malnutrition  Nutrition consulted. Most recent weight and BMI monitored-     Measurements:  Wt Readings from Last 1 Encounters:   02/14/24 67.6 kg (149 lb)   Body mass index is 24.79 kg/m².    Patient has been screened and assessed by RD.    Malnutrition Type:  Context: chronic illness  Level: moderate    Malnutrition Characteristic Summary:  Energy Intake (Malnutrition): less than 75% for greater than or equal to 1 month  Muscle Mass (Malnutrition): mild depletion  Fluid Accumulation (Malnutrition): severe    Interventions/Recommendations (treatment strategy):  1. Continue gluten free diet with texture per SLP  2. modify ONS to [facility] renal TID  3. RD to monitor and follow      Type 2 diabetes mellitus with diabetic polyneuropathy, without long-term current  use of insulin  Last A1C 4.9    - LD SSI, add scheduled detemir and aspart if needed    GI  GIB (gastrointestinal bleeding)  - Recent GIB on last admission with GI recommending 8 week course of Protonix s/p EGD with nonbleeding gastric ulcer. PPI end date 02/23/24 and follow up EGD planned at that time. Continue PPI while inpatient.         Critical Care Time: 35 minutes    Critical care was time spent personally by me on the following activities: development of treatment plan with patient or surrogate and bedside caregivers, discussions with consultants, evaluation of patient's response to treatment, examination of patient, ordering and performing treatments and interventions, ordering and review of laboratory studies, ordering and review of radiographic studies, pulse oximetry, re-evaluation of patient's condition. This critical care time did not overlap with that of any other provider or involve time for any procedures.     Martin Thornton MD  Critical Care Medicine  [facility]

## 2024-02-18 NOTE — ASSESSMENT & PLAN NOTE
Nutrition consulted. Most recent weight and BMI monitored-     Measurements:  Wt Readings from Last 1 Encounters:   02/14/24 67.6 kg (149 lb)   Body mass index is 24.79 kg/m².    Patient has been screened and assessed by RD.    Malnutrition Type:  Context: chronic illness  Level: moderate    Malnutrition Characteristic Summary:  Energy Intake (Malnutrition): less than 75% for greater than or equal to 1 month  Muscle Mass (Malnutrition): mild depletion  Fluid Accumulation (Malnutrition): severe    Interventions/Recommendations (treatment strategy):  1. Continue gluten free diet with texture per SLP  2. [facility] to [facility]  3. RD to monitor and follow

## 2024-02-18 NOTE — ASSESSMENT & PLAN NOTE
Calcium   Date Value Ref Range Status   02/18/2024 7.3 (L) 8.7 - 10.5 mg/dL Final   02/18/2024 7.3 (L) 8.7 - 10.5 mg/dL Final     Phosphorus   Date Value Ref Range Status   02/18/2024 6.5 (H) 2.7 - 4.5 mg/dL Final   02/18/2024 6.5 (H) 2.7 - 4.5 mg/dL Final     PTH, Intact   Date Value Ref Range Status   02/02/2024 111.8 (H) 9.0 - 77.0 pg/mL Final

## 2024-02-18 NOTE — ASSESSMENT & PLAN NOTE
Patient is identified as having Systolic (HFrEF) heart failure that is Acute. CHF is currently uncontrolled due to Rales/crackles on pulmonary exam and Pulmonary edema/pleural effusion on CXR. Latest ECHO performed and demonstrates- Results for orders placed during the hospital encounter of 02/12/24    Echo    Interpretation Summary    Left Ventricle: The left ventricle is normal in size. Normal wall thickness. Regional wall motion abnormalities present. See diagram for wall motion findings. There is severely reduced systolic function with a visually estimated ejection fraction of 15 - 20%. Ejection fraction by visual approximation is 15%. Unable to assess diastolic function due to atrial fibrillation.    Right Ventricle: Normal right ventricular cavity size. Wall thickness is normal. Right ventricle wall motion  is normal. Systolic function is normal.    Left Atrium: Left atrium is severely dilated.    Right Atrium: Right atrium is mildly dilated.    Aortic Valve: There is moderate aortic valve sclerosis. Mildly restricted motion.    Mitral Valve: There is moderate to severe regurgitation.    Tricuspid Valve: There is mild regurgitation.    IVC/SVC: Intermediate venous pressure at 8 mmHg.    Pericardium: There is a trivial effusion. Left pleural effusion.  . Continue Beta Blocker, ACE/ARB, and ARNI and monitor clinical status closely. Monitor on telemetry. Patient is on CHF pathway.  Monitor strict Is&Os and daily weights.  Place on fluid restriction of 1.5 L. Cardiology has been consulted. Continue to stress to patient importance of self efficacy and  on diet for CHF. Last BNP reviewed- and noted below   Recent Labs   Lab 02/12/24  0115   BNP 2,754*       - Rediscuss w/ Cardiology

## 2024-02-18 NOTE — SUBJECTIVE & OBJECTIVE
Interval History/Significant Events:  Doing better this morning.  Tolerated SLED overnight off norepinephrine.  Weaned down from Airvo to HFNC at 12 L/min.  More fatigued this morning.  Discussed with  and sister at bedside.    Review of Systems   All other systems reviewed and are negative.    Objective:     Vital Signs (Most Recent):  Temp: 97.6 °F (36.4 °C) (02/18/24 1100)  Pulse: 83 (02/18/24 1245)  Resp: (!) 25 (02/18/24 1245)  BP: 131/63 (02/18/24 1245)  SpO2: 100 % (02/18/24 1245) Vital Signs (24h Range):  Temp:  [97.6 °F (36.4 °C)-98.3 °F (36.8 °C)] 97.6 °F (36.4 °C)  Pulse:  [75-86] 83  Resp:  [11-49] 25  SpO2:  [93 %-100 %] 100 %  BP: ()/(41-68) 131/63   Weight: 67.6 kg (149 lb)  Body mass index is 24.79 kg/m².      Intake/Output Summary (Last 24 hours) at 2/18/2024 1401  Last data filed at 2/18/2024 0200  Gross per 24 hour   Intake 998.69 ml   Output 612 ml   Net 386.69 ml            Physical Exam  Vitals and nursing note reviewed.   Constitutional:       General: She is not in acute distress.     Appearance: She is normal weight. She is not ill-appearing, toxic-appearing or diaphoretic.      Comments: Appears frail   HENT:      Head: Normocephalic and atraumatic.   Cardiovascular:      Rate and Rhythm: Rhythm irregular.      Pulses: Normal pulses.      Heart sounds: Normal heart sounds. No murmur heard.     No friction rub. No gallop.   Pulmonary:      Comments: Moderately increased respiratory effort with bilateral crackles  Abdominal:      General: Abdomen is flat. Bowel sounds are normal. There is no distension.      Palpations: Abdomen is soft.      Tenderness: There is no abdominal tenderness. There is no guarding or rebound.   Musculoskeletal:         General: No swelling or tenderness.   Skin:     General: Skin is warm and dry.      Coloration: Skin is not jaundiced or pale.            Vents:  Oxygen Concentration (%): 40 (02/17/24 2100)  Lines/Drains/Airways       Central Venous  Catheter Line  Duration             Permacath right subclavian -- days         Hemodialysis Catheter 01/25/23 1501 right internal jugular 388 days              Peripheral Intravenous Line  Duration                  Hemodialysis AV Fistula 09/01/23 0800 Left forearm 170 days         Peripheral IV - Single Lumen 02/16/24 2215 18 G Anterior;Distal;Left Forearm 1 day                  Significant Labs:    CBC/Anemia Profile:  Recent Labs   Lab 02/17/24  0432 02/18/24  0403   WBC 11.46 9.44   HGB 8.7* 7.9*   HCT 26.9* 24.9*    157   MCV 94 95   RDW 22.9* 23.2*          Chemistries:  Recent Labs   Lab 02/17/24  0432 02/17/24  1345 02/17/24  2156 02/18/24  0403   *  127* 128* 128* 127*  127*   K 4.3  4.3 4.5 3.6 4.6  4.6   CL 95  95 96 95 94*  94*   CO2 22*  22* 20* 24 24  24   BUN <3*  <3* 4* 12 13  13   CREATININE 0.4*  0.4* 0.6 0.7 1.0  1.0   CALCIUM 7.3*  7.3* 7.5* 7.4* 7.3*  7.3*   ALBUMIN 2.0*  2.0* 1.9* 1.8* 1.9*  1.9*   PROT 5.5*  --   --  5.3*   BILITOT 0.4  --   --  0.4   ALKPHOS 119  --   --  102   *  --   --  83*   AST 85*  --   --  50*   MG 1.9  1.9 1.8 1.7 2.2  2.2   PHOS 3.1  3.1 1.9* 7.2* 6.5*  6.5*         All pertinent labs within the past 24 hours have been reviewed.    Significant Imaging:  I have reviewed all pertinent imaging results/findings within the past 24 hours.

## 2024-02-18 NOTE — ASSESSMENT & PLAN NOTE
On Cefpodoxime and Flagyl until planned end date of 02/15/24 per ID recs. Given recent hospitalization and re-admission will start broad spectrum Abx.     - Cultures negative here  - Completed 7 days of vanc and zosyn

## 2024-02-18 NOTE — ASSESSMENT & PLAN NOTE
ESRD   Hypoxic resp failure - improved with 1.5 liter removal;   ECHO 2/12 with newly depressed EF of 10-15% from 60-65% one month prior    Plan/Recommendation  -Will plan for HD tomorrow.   -Keep MAP > 65  -Keep hemoglobin > 7  -Strict ins and outs  -Avoid nephrotoxic agents if possible and renally dose medications  -Avoid drastic hemodynamic changes if possible

## 2024-02-18 NOTE — ASSESSMENT & PLAN NOTE
Goal hemoglobin in ESRD is 10-12  Hemoglobin   Date Value Ref Range Status   02/18/2024 7.9 (L) 12.0 - 16.0 g/dL Final     Iron   Date Value Ref Range Status   02/02/2024 52 30 - 160 ug/dL Final     Transferrin   Date Value Ref Range Status   02/02/2024 121 (L) 200 - 375 mg/dL Final     TIBC   Date Value Ref Range Status   02/02/2024 179 (L) 250 - 450 ug/dL Final     Saturated Iron   Date Value Ref Range Status   02/02/2024 29 20 - 50 % Final     Ferritin   Date Value Ref Range Status   02/02/2024 1,803 (H) 20.0 - 300.0 ng/mL Final

## 2024-02-18 NOTE — ASSESSMENT & PLAN NOTE
Patient with Hypoxic Respiratory failure which is Acute on chronic.  she is on home oxygen at 2 LPM. Supplemental oxygen was provided and noted- Oxygen Concentration (%):  [40] 40    .   Signs/symptoms of respiratory failure include- tachypnea, increased work of breathing, respiratory distress, and use of accessory muscles. Contributing diagnoses includes - Pleural effusion Labs and images were reviewed. Patient Has recent ABG, which has been reviewed. Will treat underlying causes and adjust management of respiratory failure as follows-     Patient without CHF on most recent Echo (01/12/24) which demonstrated EF 60-65%, no significant valvulopathy, borderline pulm HTN. On torsemide at baseline. Patient reports having some high sodium foods lately. High suspicion for hypervolemia given BNP elevation, though this may be confounded by CKD.     - Improved w/ volume removal. Down to 4L and will likely be able to come down further today  - Repeat echocardiogram with new LVEF 15-20% with concerns for hypoperfusion  - Cardiology consulted  - Interventional Cardiology deferring coronary angiography due to respiratory status

## 2024-02-18 NOTE — NURSING
"      SICU PLAN OF CARE NOTE    Dx: Acute hypoxemic respiratory failure    Vital Signs: BP (!) 129/58   Pulse 86   Temp 97.6 °F (36.4 °C) (Axillary)   Resp (!) 27   Ht 5' 5" (1.651 m)   Wt 67.6 kg (149 lb)   LMP  (LMP Unknown) Comment: BEAU/ NO BSO  1986  SpO2 96%   Breastfeeding No   BMI 24.79 kg/m²     SHIFT EVENTS:  VSS / No acute events this shift, stepdown orders in place.      Neuro: AAO x4, Follows Commands, and Moves All Extremities    Respiratory:4L HFNC    Cardiac: NSR    Diet: Diabetic Diet    Urine Output: Anuric 0 ml/shift    Labs/Accuchecks: Accuchecks AC/HS       Skin: No new skin issues, nonblanchable redness to sacrum. Wound care orders present, Triad to buttocks Q shift, Foam dressing in place.  Patient turned q2h to prevent further breakdwon. Bed plugged in.     Questions and concerns addressed. See flow sheets for full assessment details.   "

## 2024-02-19 LAB
ALBUMIN SERPL BCP-MCNC: 1.9 G/DL (ref 3.5–5.2)
ALBUMIN SERPL BCP-MCNC: 1.9 G/DL (ref 3.5–5.2)
ALP SERPL-CCNC: 92 U/L (ref 55–135)
ALT SERPL W/O P-5'-P-CCNC: 62 U/L (ref 10–44)
ANION GAP SERPL CALC-SCNC: 11 MMOL/L (ref 8–16)
ANION GAP SERPL CALC-SCNC: 7 MMOL/L (ref 8–16)
AST SERPL-CCNC: 36 U/L (ref 10–40)
BACTERIA SPEC AEROBE CULT: NO GROWTH
BASOPHILS # BLD AUTO: 0.02 K/UL (ref 0–0.2)
BASOPHILS NFR BLD: 0.2 % (ref 0–1.9)
BILIRUB SERPL-MCNC: 0.4 MG/DL (ref 0.1–1)
BUN SERPL-MCNC: 12 MG/DL (ref 8–23)
BUN SERPL-MCNC: 25 MG/DL (ref 8–23)
CALCIUM SERPL-MCNC: 7.9 MG/DL (ref 8.7–10.5)
CALCIUM SERPL-MCNC: 7.9 MG/DL (ref 8.7–10.5)
CHLORIDE SERPL-SCNC: 100 MMOL/L (ref 95–110)
CHLORIDE SERPL-SCNC: 97 MMOL/L (ref 95–110)
CO2 SERPL-SCNC: 20 MMOL/L (ref 23–29)
CO2 SERPL-SCNC: 25 MMOL/L (ref 23–29)
CREAT SERPL-MCNC: 0.9 MG/DL (ref 0.5–1.4)
CREAT SERPL-MCNC: 1.9 MG/DL (ref 0.5–1.4)
DIFFERENTIAL METHOD BLD: ABNORMAL
EOSINOPHIL # BLD AUTO: 0 K/UL (ref 0–0.5)
EOSINOPHIL NFR BLD: 0.3 % (ref 0–8)
ERYTHROCYTE [DISTWIDTH] IN BLOOD BY AUTOMATED COUNT: 23.5 % (ref 11.5–14.5)
EST. GFR  (NO RACE VARIABLE): 27.5 ML/MIN/1.73 M^2
EST. GFR  (NO RACE VARIABLE): >60 ML/MIN/1.73 M^2
FUNGUS SPEC CULT: NORMAL
FUNGUS SPEC CULT: NORMAL
GLUCOSE SERPL-MCNC: 135 MG/DL (ref 70–110)
GLUCOSE SERPL-MCNC: 172 MG/DL (ref 70–110)
HCT VFR BLD AUTO: 24.2 % (ref 37–48.5)
HGB BLD-MCNC: 7.6 G/DL (ref 12–16)
IMM GRANULOCYTES # BLD AUTO: 0.11 K/UL (ref 0–0.04)
IMM GRANULOCYTES NFR BLD AUTO: 1.2 % (ref 0–0.5)
LYMPHOCYTES # BLD AUTO: 0.4 K/UL (ref 1–4.8)
LYMPHOCYTES NFR BLD: 4.9 % (ref 18–48)
MAGNESIUM SERPL-MCNC: 1.8 MG/DL (ref 1.6–2.6)
MAGNESIUM SERPL-MCNC: 2.2 MG/DL (ref 1.6–2.6)
MCH RBC QN AUTO: 30.2 PG (ref 27–31)
MCHC RBC AUTO-ENTMCNC: 31.4 G/DL (ref 32–36)
MCV RBC AUTO: 96 FL (ref 82–98)
MONOCYTES # BLD AUTO: 0.8 K/UL (ref 0.3–1)
MONOCYTES NFR BLD: 8.5 % (ref 4–15)
NEUTROPHILS # BLD AUTO: 7.7 K/UL (ref 1.8–7.7)
NEUTROPHILS NFR BLD: 84.9 % (ref 38–73)
NRBC BLD-RTO: 0 /100 WBC
PHOSPHATE SERPL-MCNC: 2 MG/DL (ref 2.7–4.5)
PHOSPHATE SERPL-MCNC: 5.4 MG/DL (ref 2.7–4.5)
PLATELET # BLD AUTO: 166 K/UL (ref 150–450)
PMV BLD AUTO: 10.7 FL (ref 9.2–12.9)
POCT GLUCOSE: 121 MG/DL (ref 70–110)
POCT GLUCOSE: 162 MG/DL (ref 70–110)
POCT GLUCOSE: 171 MG/DL (ref 70–110)
POCT GLUCOSE: 206 MG/DL (ref 70–110)
POTASSIUM SERPL-SCNC: 4 MMOL/L (ref 3.5–5.1)
POTASSIUM SERPL-SCNC: 4 MMOL/L (ref 3.5–5.1)
PROT SERPL-MCNC: 5.6 G/DL (ref 6–8.4)
RBC # BLD AUTO: 2.52 M/UL (ref 4–5.4)
SODIUM SERPL-SCNC: 128 MMOL/L (ref 136–145)
SODIUM SERPL-SCNC: 132 MMOL/L (ref 136–145)
WBC # BLD AUTO: 9.05 K/UL (ref 3.9–12.7)

## 2024-02-19 PROCEDURE — 80069 RENAL FUNCTION PANEL: CPT | Performed by: STUDENT IN AN ORGANIZED HEALTH CARE EDUCATION/TRAINING PROGRAM

## 2024-02-19 PROCEDURE — 97530 THERAPEUTIC ACTIVITIES: CPT

## 2024-02-19 PROCEDURE — 25000003 PHARM REV CODE 250: Performed by: STUDENT IN AN ORGANIZED HEALTH CARE EDUCATION/TRAINING PROGRAM

## 2024-02-19 PROCEDURE — 25000003 PHARM REV CODE 250

## 2024-02-19 PROCEDURE — 63600175 PHARM REV CODE 636 W HCPCS

## 2024-02-19 PROCEDURE — 99233 SBSQ HOSP IP/OBS HIGH 50: CPT | Mod: ,,, | Performed by: CLINICAL NURSE SPECIALIST

## 2024-02-19 PROCEDURE — 63600175 PHARM REV CODE 636 W HCPCS: Performed by: INTERNAL MEDICINE

## 2024-02-19 PROCEDURE — 83735 ASSAY OF MAGNESIUM: CPT | Mod: 91 | Performed by: STUDENT IN AN ORGANIZED HEALTH CARE EDUCATION/TRAINING PROGRAM

## 2024-02-19 PROCEDURE — 63600175 PHARM REV CODE 636 W HCPCS: Mod: JZ | Performed by: STUDENT IN AN ORGANIZED HEALTH CARE EDUCATION/TRAINING PROGRAM

## 2024-02-19 PROCEDURE — 25000003 PHARM REV CODE 250: Performed by: INTERNAL MEDICINE

## 2024-02-19 PROCEDURE — 97535 SELF CARE MNGMENT TRAINING: CPT

## 2024-02-19 PROCEDURE — 85025 COMPLETE CBC W/AUTO DIFF WBC: CPT | Performed by: INTERNAL MEDICINE

## 2024-02-19 PROCEDURE — 99233 SBSQ HOSP IP/OBS HIGH 50: CPT | Mod: GC,,, | Performed by: INTERNAL MEDICINE

## 2024-02-19 PROCEDURE — 20600001 HC STEP DOWN PRIVATE ROOM

## 2024-02-19 PROCEDURE — 99233 SBSQ HOSP IP/OBS HIGH 50: CPT | Mod: ,,, | Performed by: INTERNAL MEDICINE

## 2024-02-19 PROCEDURE — 84100 ASSAY OF PHOSPHORUS: CPT | Performed by: INTERNAL MEDICINE

## 2024-02-19 PROCEDURE — 83735 ASSAY OF MAGNESIUM: CPT | Performed by: INTERNAL MEDICINE

## 2024-02-19 PROCEDURE — 97110 THERAPEUTIC EXERCISES: CPT

## 2024-02-19 PROCEDURE — 94761 N-INVAS EAR/PLS OXIMETRY MLT: CPT

## 2024-02-19 PROCEDURE — 97112 NEUROMUSCULAR REEDUCATION: CPT

## 2024-02-19 PROCEDURE — 90945 DIALYSIS ONE EVALUATION: CPT

## 2024-02-19 PROCEDURE — 80053 COMPREHEN METABOLIC PANEL: CPT | Performed by: INTERNAL MEDICINE

## 2024-02-19 PROCEDURE — 99497 ADVNCD CARE PLAN 30 MIN: CPT | Mod: ,,, | Performed by: CLINICAL NURSE SPECIALIST

## 2024-02-19 RX ORDER — HYDROCODONE BITARTRATE AND ACETAMINOPHEN 500; 5 MG/1; MG/1
TABLET ORAL CONTINUOUS
Status: DISCONTINUED | OUTPATIENT
Start: 2024-02-19 | End: 2024-02-19

## 2024-02-19 RX ORDER — SODIUM CHLORIDE 9 MG/ML
INJECTION, SOLUTION INTRAVENOUS ONCE
Status: CANCELLED | OUTPATIENT
Start: 2024-02-19 | End: 2024-02-19

## 2024-02-19 RX ORDER — MAGNESIUM SULFATE HEPTAHYDRATE 40 MG/ML
2 INJECTION, SOLUTION INTRAVENOUS
Status: DISCONTINUED | OUTPATIENT
Start: 2024-02-19 | End: 2024-02-19

## 2024-02-19 RX ADMIN — HEPARIN SODIUM 5000 UNITS: 5000 INJECTION INTRAVENOUS; SUBCUTANEOUS at 09:02

## 2024-02-19 RX ADMIN — ATORVASTATIN CALCIUM 40 MG: 40 TABLET, FILM COATED ORAL at 08:02

## 2024-02-19 RX ADMIN — SODIUM CHLORIDE: 9 INJECTION, SOLUTION INTRAVENOUS at 11:02

## 2024-02-19 RX ADMIN — VENLAFAXINE 37.5 MG: 37.5 TABLET ORAL at 08:02

## 2024-02-19 RX ADMIN — HEPARIN SODIUM 5000 UNITS: 5000 INJECTION INTRAVENOUS; SUBCUTANEOUS at 03:02

## 2024-02-19 RX ADMIN — INSULIN ASPART 4 UNITS: 100 INJECTION, SOLUTION INTRAVENOUS; SUBCUTANEOUS at 11:02

## 2024-02-19 RX ADMIN — HEPARIN SODIUM 5000 UNITS: 5000 INJECTION INTRAVENOUS; SUBCUTANEOUS at 05:02

## 2024-02-19 RX ADMIN — AMIODARONE HYDROCHLORIDE 400 MG: 200 TABLET ORAL at 08:02

## 2024-02-19 RX ADMIN — ERYTHROPOIETIN 3400 UNITS: 4000 INJECTION, SOLUTION INTRAVENOUS; SUBCUTANEOUS at 03:02

## 2024-02-19 RX ADMIN — DRONABINOL 5 MG: 2.5 CAPSULE ORAL at 05:02

## 2024-02-19 RX ADMIN — PANTOPRAZOLE SODIUM 40 MG: 40 TABLET, DELAYED RELEASE ORAL at 05:02

## 2024-02-19 RX ADMIN — INSULIN ASPART 1 UNITS: 100 INJECTION, SOLUTION INTRAVENOUS; SUBCUTANEOUS at 09:02

## 2024-02-19 RX ADMIN — INSULIN ASPART 2 UNITS: 100 INJECTION, SOLUTION INTRAVENOUS; SUBCUTANEOUS at 08:02

## 2024-02-19 RX ADMIN — Medication 324 MG: at 08:02

## 2024-02-19 NOTE — PLAN OF CARE
"      SICU PLAN OF CARE NOTE    Dx: Acute hypoxemic respiratory failure    Shift Events: No acute event overnight. Weaned off oxygen. Multiple loose stools noted.     Goals of Care: Awaiting bed on 14th Floor, HD this AM.    Neuro: AAO x4, Arouses to Voice, and Follows Commands    Vital Signs: BP (!) 154/72   Pulse 89   Temp 97.5 °F (36.4 °C) (Oral)   Resp (!) 35   Ht 5' 5" (1.651 m)   Wt 67.6 kg (149 lb)   LMP  (LMP Unknown) Comment: BEAU/ NO BSO  1986  SpO2 97%   Breastfeeding No   BMI 24.79 kg/m²     Cardiac: SR, MAP > 65    Respiratory: Room Air    Diet: Diabetic Diet    Gtts: n/a    Urine Output: Anuric, pt on CRRT/HD    Drains:      Labs/Accuchecks: AC/HS.    Skin: Wound care provided, turning q2h, no new skin issue     "

## 2024-02-19 NOTE — NURSING
OK to rinse-back CRRT per nephrology MD since floor bed is available. All blood returned. 5 hours of treatment completed. See CRRT documentation for details.

## 2024-02-19 NOTE — ASSESSMENT & PLAN NOTE
ESRD   Hypoxic resp failure - improved with 1.5 liter removal;   ECHO 2/12 with newly depressed EF of 10-15% from 60-65% one month prior    Plan/Recommendation  -SLED 4hrs then 6hrs SCUF goal UF ~1.5-2L.   -Keep MAP > 65  -Keep hemoglobin > 7  -Strict ins and outs  -Avoid nephrotoxic agents if possible and renally dose medications  -Avoid drastic hemodynamic changes if possible

## 2024-02-19 NOTE — PROGRESS NOTES
[facility]  Nephrology  Progress Note    Patient Name: Lily Green  MRN: [id number]  Admission Date: 2/12/2024  Hospital Length of Stay: 7 days  Attending Provider: Bernabe Blas*   Primary Care Physician: Pavel Calixto MD  Principal Problem:Acute hypoxemic respiratory failure    Subjective:     HPI: 73 year old female with a histoyr of ESRD(MW), HTN, HLD, T2DM, IBS, RML carcinoid tumor admitted to [facility] for hypoxic respiratory distress. Initially thought to be from volume overload, however emergent HD done overnight resulted in Afib with RVR. She initially presented with hypoxic resp failure requiring NC satting 70% with 2 liters. MICU was called and bipap was started with improvement in respiratory status.     She was previously [place] to [facility] and underwent a right thoracotomy for newly found RML carcinoid tumor. Course was complicated by right sided chest tube placement and new onset afib with RVR and GIB (EGD with gastric ulcers).     Outpatient ESRD  Hemodialysis  Outpatient nephrologist: Dr. Vyas  Outpatient center: [facility]  Dialysis schedule: [facility]  Last treatment: 2/10/24  Anuric: Minimal  Access: right tunnled dialysis cather; has left fistula, pending maturation      Interval History:     No acute events overnight. Patient on 3-4L nasal cannula. Remains with hyponatremia ~128. CXR pending      Review of patient's allergies indicates:   Allergen Reactions    Crestor [rosuvastatin] Swelling    Gluten protein      Current Facility-Administered Medications   Medication Frequency    0.9%  NaCl infusion (CRRT USE ONLY) Continuous    0.9%  NaCl infusion Once    0.9%  NaCl infusion Continuous    acetaminophen tablet 650 mg Q4H PRN    albuterol-ipratropium 2.5 mg-0.5 mg/3 mL nebulizer solution 3 mL Q4H PRN    amiodarone tablet 400 mg BID    Followed by    [START ON 2/25/2024] amiodarone tablet 200 mg Daily    atorvastatin tablet 40 mg QHS    dextrose 10%  bolus 125 mL 125 mL PRN    dextrose 10% bolus 250 mL 250 mL PRN    droNABinol capsule 5 mg BID AC    ferrous gluconate tablet 324 mg Daily with breakfast    glucagon (human recombinant) injection 1 mg PRN    glucose chewable tablet 16 g PRN    glucose chewable tablet 24 g PRN    heparin (porcine) injection 5,000 Units Q8H    insulin aspart U-100 pen 0-10 Units QID (AC + HS) PRN    magnesium sulfate 2g in water 50mL IVPB (premix) PRN    melatonin tablet 6 mg Nightly PRN    ondansetron injection 4 mg Q6H PRN    pantoprazole EC tablet 40 mg BID AC    pregabalin capsule 75 mg Every other day    sodium chloride 0.9% flush 10 mL PRN    sodium phosphate 20.01 mmol in dextrose 5 % (D5W) 250 mL IVPB PRN    sodium phosphate 30 mmol in dextrose 5 % (D5W) 250 mL IVPB PRN    sodium phosphate 39.99 mmol in dextrose 5 % (D5W) 250 mL IVPB PRN    venlafaxine tablet 37.5 mg Daily       Objective:     Vital Signs (Most Recent):  Temp: 97.3 °F (36.3 °C) (02/19/24 0815)  Pulse: 84 (02/19/24 1015)  Resp: (!) 44 (02/19/24 1015)  BP: (!) 148/68 (02/19/24 1000)  SpO2: 97 % (02/19/24 1015) Vital Signs (24h Range):  Temp:  [97.3 °F (36.3 °C)-97.8 °F (36.6 °C)] 97.3 °F (36.3 °C)  Pulse:  [80-89] 84  Resp:  [12-45] 44  SpO2:  [92 %-100 %] 97 %  BP: (129-154)/(55-94) 148/68     Weight: 67.6 kg (149 lb) (02/14/24 1245)  Body mass index is 24.79 kg/m².  Body surface area is 1.76 meters squared.    I/O last 3 completed shifts:  In: 491.2 [P.O.:250; I.V.:49.6; IV Piggyback:191.6]  Out: -      Physical Exam  Vitals and nursing note reviewed.   Constitutional:       General: She is not in acute distress.     Appearance: She is normal weight. She is not ill-appearing, toxic-appearing or diaphoretic.      Comments: Appears frail   HENT:      Head: Normocephalic and atraumatic.   Cardiovascular:      Rate and Rhythm: Rhythm irregular.      Pulses: Normal pulses.      Heart sounds: Normal heart sounds. No murmur heard.     No friction rub. No gallop.    Pulmonary:      Comments: Moderately increased respiratory effort with bilateral crackles  Abdominal:      General: Abdomen is flat. Bowel sounds are normal. There is no distension.      Palpations: Abdomen is soft.      Tenderness: There is no abdominal tenderness. There is no guarding or rebound.   Musculoskeletal:         General: No swelling or tenderness.   Skin:     General: Skin is warm and dry.      Coloration: Skin is not jaundiced or pale.          Significant Labs:  All labs within the past 24 hours have been reviewed.     Significant Imaging:  Labs: Reviewed  Assessment/Plan:     Pulmonary  * Acute hypoxemic respiratory failure  UF with RRT    Renal/  ESRD (end stage renal disease)  ESRD   Hypoxic resp failure - improved with 1.5 liter removal;   ECHO 2/12 with newly depressed EF of 10-15% from 60-65% one month prior    Plan/Recommendation  -SLED 4hrs then 6hrs SCUF goal UF ~1.5-2L.   -Keep MAP > 65  -Keep hemoglobin > 7  -Strict ins and outs  -Avoid nephrotoxic agents if possible and renally dose medications  -Avoid drastic hemodynamic changes if possible    Oncology  Anemia in ESRD (end-stage renal disease)  Goal hemoglobin in ESRD is 10-12  Hemoglobin   Date Value Ref Range Status   02/19/2024 7.6 (L) 12.0 - 16.0 g/dL Final     Iron   Date Value Ref Range Status   02/02/2024 52 30 - 160 ug/dL Final     Transferrin   Date Value Ref Range Status   02/02/2024 121 (L) 200 - 375 mg/dL Final     TIBC   Date Value Ref Range Status   02/02/2024 179 (L) 250 - 450 ug/dL Final     Saturated Iron   Date Value Ref Range Status   02/02/2024 29 20 - 50 % Final     Ferritin   Date Value Ref Range Status   02/02/2024 1,803 (H) 20.0 - 300.0 ng/mL Final       Endocrine  Secondary hyperparathyroidism  Calcium   Date Value Ref Range Status   02/19/2024 7.9 (L) 8.7 - 10.5 mg/dL Final     Phosphorus   Date Value Ref Range Status   02/19/2024 5.4 (H) 2.7 - 4.5 mg/dL Final     PTH, Intact   Date Value Ref Range Status    02/02/2024 111.8 (H) 9.0 - 77.0 pg/mL Final             Thank you for your consult. I will follow-up with patient. Please contact us if you have any additional questions.    Case discussed with attending. Attestation to follow.       Henri Carlisle DO  Nephrology  [facility]

## 2024-02-19 NOTE — PT/OT/SLP PROGRESS
Physical Therapy   Progress Note    Patient Name:  Lily Green  MRN: [id number]    Admit Date: 2/12/2024  Admitting Diagnosis:  Acute hypoxemic respiratory failure  Length of Stay: 7 days  Recent Surgery: * No surgery found *      Recommendations:     Discharge Recommendations: moderate intensity therapy  Equipment recommendations: wheelchair  Barriers to discharge: Increased level of skilled assistance required    Assessment:     Lily Green is a 73 y.o. female admitted to [facility] [place] [facility] on 2/12/2024 with medical diagnosis of Acute hypoxemic respiratory failure. Pt presents with weakness, impaired endurance, impaired functional mobility, impaired balance, decreased coordination, impaired cardiopulmonary response to activity. Pt is progressing towards goals, but has not yet reached prior level of function.     Pt agreeable to therapy session with  at bedside. Pt appears anxious throughout session with respiratory rate in 40s and excessive accessory muscle breathing. Pt able to sit eob with minimal assistance. Pt tolerated sitting eob for 10 min perform ADLs. Able to transfer with minimal assist to recliner. Encouraged pt to sit up for 1 hour. All VSS during session.    Lily Green would benefit from continued acute PT intervention to improve quality of life, focus on recovery of impairments, provide patient/caregiver education, reduce fall risk, and maximize (I) and safety with functional mobility. Once medically stable, recommending pt discharge to moderate intensity therapy. Patient continues to demonstrate the need for moderate intensity therapy on a daily basis post acute exhibited by decreased independence with self-care and functional mobility .      Rehab Prognosis: Fair    Plan:     During this hospitalization, patient to be seen 3 x/week to address the identified rehab impairments via gait training, therapeutic activities, therapeutic exercises, neuromuscular re-education and  "progress towards stated goals.     Plan of Care Expires:  03/16/24  Plan of Care reviewed with: patient, spouse, and family    This plan of care has been discussed with the patient/caregiver, who was included in its development and is in agreement with the identified goals and treatment plan.     Subjective     Communicated with RN prior to session.  Patient found supine upon PT entry to room, agreeable to therapy session. Pt's  present during session.    Patient/Family Comments/goals: "How long do I have to stay here" in regards to sitting in recliner    Pain/Comfort:  Pain Rating 1: 0/10  Pain Rating Post-Intervention 1: 0/10    Patients cultural, spiritual, Anabaptist conflicts given the current situation: None identified     Objective:   OT present for cotreat due to pt's multiple medical comorbidities and functional/cognition deficits requiring two skilled therapists to appropriately progress pt's musculoskeletal strength, neuromuscular control, and endurance while taking into consideration medical acuity and pt safety.    Patient found with: telemetry, pulse ox (continuous), blood pressure cuff    General Precautions: Standard, fall   Orthopedic Precautions:    Braces:     Oxygen Device: room air    Cognition:  Pt is Alert during session.    Therapist provided skilled verbal and tactile cueing to facilitate the following functional mobility tasks. Listed tasks are focused on recovery of impairments and improving pt's independence and efficiency with bed mobility, transfers and ambulation as able.     Bed Mobility:  Supine > Sit: Minimal Assistance    Transfers:   Sit <> Stand Transfer: Minimal Assistance from eob with no AD   Bed <> Chair: Minimal Assistance with no AD via stand pivot                 Balance:  Dynamic Sitting: FAIR+: Maintains balance through MINIMAL excursions of active trunk motion  Standing:  Static: POOR+: Needs MINIMAL assist to maintain   Dynamic: 0: N/A    Outcome Measure: AM-PAC " 6 CLICK MOBILITY  Total Score:17     Patient/Caregiver Education and Additional Therapeutic Activities/Exercises     Provided pt/caregiver education regarding:   PT POC and goals for therapy   Safety with mobility and fall risk   Safe management of AD as needed   Energy conservation techniques   Instruction on use of call button and importance of calling nursing staff for assistance with mobility     Patient/caregiver able to verbalize understanding; will follow-up with pt/caregiver during current admit for additional questions/concerns within scope of practice.     White board updated.     Patient left up in chair with all lines intact, call button in reach, and nsg notified.    Goals:     Multidisciplinary Problems       Physical Therapy Goals          Problem: Physical Therapy    Goal Priority Disciplines Outcome Goal Variances Interventions   Physical Therapy Goal     PT, PT/OT Ongoing, Progressing     Description: Goals to be met by: 3/16/24     Patient will increase functional independence with mobility by performin. Supine to sit with Contact Guard Assistance  2. Sit to supine with Contact Guard Assistance  3. Sit to stand transfer with Contact Guard Assistance  4. Bed to chair transfer with Contact Guard Assistance using Rolling Walker  5. Gait  x 50 feet with Contact Guard Assistance using Rolling Walker.                          Time Tracking:       PT Received On: 24  PT Start Time: 1012     PT Stop Time: 1035  PT Total Time (min): 23 min     Billable Minutes: Therapeutic Activity 8 and Neuromuscular Re-education 15    2024

## 2024-02-19 NOTE — ASSESSMENT & PLAN NOTE
Goal hemoglobin in ESRD is 10-12  Hemoglobin   Date Value Ref Range Status   02/19/2024 7.6 (L) 12.0 - 16.0 g/dL Final     Iron   Date Value Ref Range Status   02/02/2024 52 30 - 160 ug/dL Final     Transferrin   Date Value Ref Range Status   02/02/2024 121 (L) 200 - 375 mg/dL Final     TIBC   Date Value Ref Range Status   02/02/2024 179 (L) 250 - 450 ug/dL Final     Saturated Iron   Date Value Ref Range Status   02/02/2024 29 20 - 50 % Final     Ferritin   Date Value Ref Range Status   02/02/2024 1,803 (H) 20.0 - 300.0 ng/mL Final

## 2024-02-19 NOTE — SUBJECTIVE & OBJECTIVE
Interval History:     No acute events overnight. Patient on 3-4L nasal cannula. Remains with hyponatremia ~128. CXR pending      Review of patient's allergies indicates:   Allergen Reactions    Crestor [rosuvastatin] Swelling    Gluten protein      Current Facility-Administered Medications   Medication Frequency    0.9%  NaCl infusion (CRRT USE ONLY) Continuous    0.9%  NaCl infusion Once    0.9%  NaCl infusion Continuous    acetaminophen tablet 650 mg Q4H PRN    albuterol-ipratropium 2.5 mg-0.5 mg/3 mL nebulizer solution 3 mL Q4H PRN    amiodarone tablet 400 mg BID    Followed by    [START ON 2/25/2024] amiodarone tablet 200 mg Daily    atorvastatin tablet 40 mg QHS    dextrose 10% bolus 125 mL 125 mL PRN    dextrose 10% bolus 250 mL 250 mL PRN    droNABinol capsule 5 mg BID AC    ferrous gluconate tablet 324 mg Daily with breakfast    glucagon (human recombinant) injection 1 mg PRN    glucose chewable tablet 16 g PRN    glucose chewable tablet 24 g PRN    heparin (porcine) injection 5,000 Units Q8H    insulin aspart U-100 pen 0-10 Units QID (AC + HS) PRN    magnesium sulfate 2g in water 50mL IVPB (premix) PRN    melatonin tablet 6 mg Nightly PRN    ondansetron injection 4 mg Q6H PRN    pantoprazole EC tablet 40 mg BID AC    pregabalin capsule 75 mg Every other day    sodium chloride 0.9% flush 10 mL PRN    sodium phosphate 20.01 mmol in dextrose 5 % (D5W) 250 mL IVPB PRN    sodium phosphate 30 mmol in dextrose 5 % (D5W) 250 mL IVPB PRN    sodium phosphate 39.99 mmol in dextrose 5 % (D5W) 250 mL IVPB PRN    venlafaxine tablet 37.5 mg Daily       Objective:     Vital Signs (Most Recent):  Temp: 97.3 °F (36.3 °C) (02/19/24 0815)  Pulse: 84 (02/19/24 1015)  Resp: (!) 44 (02/19/24 1015)  BP: (!) 148/68 (02/19/24 1000)  SpO2: 97 % (02/19/24 1015) Vital Signs (24h Range):  Temp:  [97.3 °F (36.3 °C)-97.8 °F (36.6 °C)] 97.3 °F (36.3 °C)  Pulse:  [80-89] 84  Resp:  [12-45] 44  SpO2:  [92 %-100 %] 97 %  BP: (129-154)/(55-94)  148/68     Weight: 67.6 kg (149 lb) (02/14/24 1245)  Body mass index is 24.79 kg/m².  Body surface area is 1.76 meters squared.    I/O last 3 completed shifts:  In: 491.2 [P.O.:250; I.V.:49.6; IV Piggyback:191.6]  Out: -      Physical Exam  Vitals and nursing note reviewed.   Constitutional:       General: She is not in acute distress.     Appearance: She is normal weight. She is not ill-appearing, toxic-appearing or diaphoretic.      Comments: Appears frail   HENT:      Head: Normocephalic and atraumatic.   Cardiovascular:      Rate and Rhythm: Rhythm irregular.      Pulses: Normal pulses.      Heart sounds: Normal heart sounds. No murmur heard.     No friction rub. No gallop.   Pulmonary:      Comments: Moderately increased respiratory effort with bilateral crackles  Abdominal:      General: Abdomen is flat. Bowel sounds are normal. There is no distension.      Palpations: Abdomen is soft.      Tenderness: There is no abdominal tenderness. There is no guarding or rebound.   Musculoskeletal:         General: No swelling or tenderness.   Skin:     General: Skin is warm and dry.      Coloration: Skin is not jaundiced or pale.          Significant Labs:  All labs within the past 24 hours have been reviewed.     Significant Imaging:  Labs: Reviewed

## 2024-02-19 NOTE — PT/OT/SLP PROGRESS
"Occupational Therapy  Co-Treatment    Name: Lily Green  MRN: [id number]  Admitting Diagnosis:  Acute hypoxemic respiratory failure       Recommendations:     Discharge Recommendations: Moderate Intensity Therapy  Discharge Equipment Recommendations:   (TBD at next level of care)  Barriers to discharge:   (current level of skilled assistance needed)    Assessment:     Lily Green is a 73 y.o. female with a medical diagnosis of Acute hypoxemic respiratory failure.  She presents more alert and able to actively participate ,though pt requires significant encouragement. Pt able to sit EOB for self-care and t/f to chair. Performance deficits affecting function are weakness, impaired endurance, impaired self care skills, impaired functional mobility, gait instability, impaired balance, impaired cardiopulmonary response to activity, decreased safety awareness, decreased coordination. Pt benefits from co-treatment with PT to accommodate pt's activity tolerance and progression with therapy.    Rehab Prognosis:  Good; patient would benefit from acute skilled OT services to address these deficits and reach maximum level of function.       Plan:     Patient to be seen 3 x/week to address the above listed problems via self-care/home management, therapeutic activities, therapeutic exercises  Plan of Care Expires: 02/29/24  Plan of Care Reviewed with: patient, spouse    Subjective     Chief Complaint: "Let me back down."  Patient/Family Comments/goals: to get better and go home  Pain/Comfort:  Pain Rating 1: 0/10  Pain Rating Post-Intervention 1: 0/10    Objective:     Communicated with: RN prior to session.  Patient found supine with blood pressure cuff, pulse ox (continuous), telemetry upon OT entry to room.    General Precautions: Standard, fall    Orthopedic Precautions:   Braces:    Respiratory Status: Room air with nasal cannula donned     Occupational Performance:     Bed Mobility:    Patient " completed Scooting/Bridging with minimum assistance  Patient completed Supine to Sit with minimum assistance     Functional Mobility/Transfers:  Patient completed Sit <> Stand Transfer with minimum assistance  with  no assistive device   Patient completed Bed <> Chair Transfer using Step Transfer technique with minimum assistance with no assistive device  Functional Mobility: Min A x ~3 steps to chair    Activities of Daily Living:  Grooming: stand by assistance for washing face seated EOB ; moderate A for combing hair      AMPAC 6 Click ADL: 9    Treatment & Education:  Pt ed on OT POC  Daily orientation provided  Pt sat EOB x 10 min with SBA while engaged in grooming tasks  Pt completed B UE/LE AROM ex's in major planes for increased overall strength and endurance    Patient left up in chair with all lines intact, call button in reach, and RN notified    GOALS:   Multidisciplinary Problems       Occupational Therapy Goals       Not on file                    Time Tracking:     OT Date of Treatment: 02/19/24  OT Start Time: 1012  OT Stop Time: 1035  OT Total Time (min): 23 min    Billable Minutes:Self Care/Home Management 10  Therapeutic Exercise 13               2/19/2024

## 2024-02-19 NOTE — ASSESSMENT & PLAN NOTE
Calcium   Date Value Ref Range Status   02/19/2024 7.9 (L) 8.7 - 10.5 mg/dL Final     Phosphorus   Date Value Ref Range Status   02/19/2024 5.4 (H) 2.7 - 4.5 mg/dL Final     PTH, Intact   Date Value Ref Range Status   02/02/2024 111.8 (H) 9.0 - 77.0 pg/mL Final

## 2024-02-19 NOTE — PLAN OF CARE
Advance Care Planning   [facility]  Palliative Care       Patient Name: Liyl Green  MRN: [id number]  Admission Date: 2/12/2024  Hospital Length of Stay: 7 days  Code Status: Full Code   Attending Provider: Bernabe Blas*  Palliative Care Provider: EMORY Viera   Primary Care Physician: Pavel Calixto MD  Principal Problem:Acute hypoxemic respiratory failure    Palliative provider and this  spoke with patient's , Deric, and sister Sweetie about code status. After describing the process of CPR Deric decided for patient to be Full Code. Venancio verbalized in agreement.  will follow for support.     Pavel Alves LCSW  Palliative Medicine

## 2024-02-19 NOTE — PROGRESS NOTES
02/19/24 1158   Treatment   Treatment Type SLED   Treatment Status New start   Dialysis Machine Number [id number]   Dialyzer Time (hours) 0   BVP (Liters) 0 L   Solutions Labeled and Current  Yes   Access Tunneled Cath;Right;IJ   Catheter Dressing Intact  Yes   Alarms Engaged Yes   CRRT Comments sled initiated as ordered   Prescription   Time (Hours) 10   Dialysate K + (mEq/L) 4   Dialysate CA + (mEq/L) 2.25   Dialysate HCO3 - (Bicarb) (mEq/L) 30   Dialysate Na + (mEq/L) 138   Cartridge Type Other  (r300)   Dialysate Flow Rate (mL/min) 200   UF Goal Rate 450 mL/hr   CRRT Hourly Documentation   Blood Flow (mL/min) 200   UF Rate 200 cc/hr   Arterial Pressure (mmHg) -50 mmHg   Venous Pressure (mmHg) 90 mmHg   Effluent Pressure (EP) (mmHg) 30 mmHg   Total UF (Hourly Cleared) (mL) 0     SLED initiated as ordered. Right IJ permacath aspirated, flushed, and accessed using aseptic technique. Lines connected and secured.

## 2024-02-19 NOTE — PLAN OF CARE
[facility]  Discharge Reassessment    Primary Care Provider: Pavel Calixto MD    Expected Discharge Date: 2/21/2024    Reassessment (most recent)       Discharge Reassessment - 02/19/24 1413          Discharge Reassessment    Assessment Type Discharge Planning Reassessment     Communicated CAMPBELL with patient/caregiver Date not available/Unable to determine     Discharge Plan A Skilled Nursing Facility     Discharge Plan B Long-term acute care facility (LTAC)     DME Needed Upon Discharge  other (see comments)   TBD    Transition of Care Barriers None     Why the patient remains in the hospital Requires continued medical care                   Per MD's Note,  Interval History: No acute events overnight. Patient on 3-4L nasal cannula. Remains with hyponatremia ~128. CXR pendin    Discharge Plan A and Plan B have been determined by review of patient's clinical status, future medical and therapeutic needs, and coverage/benefits for post-acute care in coordination with multidisciplinary team members.       Alberto Fitch LMSW  Case Management [facility]

## 2024-02-20 PROBLEM — E78.2 HYPERLIPIDEMIA, MIXED: Chronic | Status: ACTIVE | Noted: 2019-10-17

## 2024-02-20 PROBLEM — K90.0 CELIAC DISEASE: Chronic | Status: ACTIVE | Noted: 2019-10-17

## 2024-02-20 PROBLEM — N18.6 ANEMIA IN ESRD (END-STAGE RENAL DISEASE): Chronic | Status: ACTIVE | Noted: 2023-01-30

## 2024-02-20 PROBLEM — G89.29 CHRONIC PAIN OF BOTH KNEES: Chronic | Status: ACTIVE | Noted: 2020-10-27

## 2024-02-20 PROBLEM — E11.42 TYPE 2 DIABETES MELLITUS WITH DIABETIC POLYNEUROPATHY, WITHOUT LONG-TERM CURRENT USE OF INSULIN: Chronic | Status: ACTIVE | Noted: 2019-10-17

## 2024-02-20 PROBLEM — F41.1 GENERALIZED ANXIETY DISORDER: Chronic | Status: ACTIVE | Noted: 2022-06-02

## 2024-02-20 PROBLEM — M25.562 CHRONIC PAIN OF BOTH KNEES: Chronic | Status: ACTIVE | Noted: 2020-10-27

## 2024-02-20 PROBLEM — N18.6 ESRD (END STAGE RENAL DISEASE): Chronic | Status: ACTIVE | Noted: 2024-01-02

## 2024-02-20 PROBLEM — I48.91 ATRIAL FIBRILLATION: Chronic | Status: ACTIVE | Noted: 2024-01-05

## 2024-02-20 PROBLEM — D63.1 ANEMIA IN ESRD (END-STAGE RENAL DISEASE): Chronic | Status: ACTIVE | Noted: 2023-01-30

## 2024-02-20 PROBLEM — M25.561 CHRONIC PAIN OF BOTH KNEES: Chronic | Status: ACTIVE | Noted: 2020-10-27

## 2024-02-20 PROBLEM — R55 SYNCOPE: Status: RESOLVED | Noted: 2024-01-24 | Resolved: 2024-02-20

## 2024-02-20 PROBLEM — K65.9 PERITONITIS: Status: RESOLVED | Noted: 2023-01-14 | Resolved: 2024-02-20

## 2024-02-20 PROBLEM — M50.30 DDD (DEGENERATIVE DISC DISEASE), CERVICAL: Chronic | Status: ACTIVE | Noted: 2019-10-17

## 2024-02-20 LAB
ALBUMIN SERPL BCP-MCNC: 1.9 G/DL (ref 3.5–5.2)
ALP SERPL-CCNC: 92 U/L (ref 55–135)
ALT SERPL W/O P-5'-P-CCNC: 49 U/L (ref 10–44)
ANION GAP SERPL CALC-SCNC: 7 MMOL/L (ref 8–16)
AST SERPL-CCNC: 28 U/L (ref 10–40)
BASOPHILS # BLD AUTO: 0.02 K/UL (ref 0–0.2)
BASOPHILS NFR BLD: 0.2 % (ref 0–1.9)
BILIRUB SERPL-MCNC: 0.5 MG/DL (ref 0.1–1)
BUN SERPL-MCNC: 16 MG/DL (ref 8–23)
CALCIUM SERPL-MCNC: 8.5 MG/DL (ref 8.7–10.5)
CHLORIDE SERPL-SCNC: 101 MMOL/L (ref 95–110)
CO2 SERPL-SCNC: 23 MMOL/L (ref 23–29)
CREAT SERPL-MCNC: 1.3 MG/DL (ref 0.5–1.4)
DIFFERENTIAL METHOD BLD: ABNORMAL
EOSINOPHIL # BLD AUTO: 0 K/UL (ref 0–0.5)
EOSINOPHIL NFR BLD: 0.2 % (ref 0–8)
ERYTHROCYTE [DISTWIDTH] IN BLOOD BY AUTOMATED COUNT: 23.8 % (ref 11.5–14.5)
EST. GFR  (NO RACE VARIABLE): 43.4 ML/MIN/1.73 M^2
GLUCOSE SERPL-MCNC: 149 MG/DL (ref 70–110)
HCT VFR BLD AUTO: 26 % (ref 37–48.5)
HGB BLD-MCNC: 8.4 G/DL (ref 12–16)
IMM GRANULOCYTES # BLD AUTO: 0.2 K/UL (ref 0–0.04)
IMM GRANULOCYTES NFR BLD AUTO: 1.9 % (ref 0–0.5)
LYMPHOCYTES # BLD AUTO: 0.5 K/UL (ref 1–4.8)
LYMPHOCYTES NFR BLD: 4.5 % (ref 18–48)
MAGNESIUM SERPL-MCNC: 2 MG/DL (ref 1.6–2.6)
MCH RBC QN AUTO: 31.1 PG (ref 27–31)
MCHC RBC AUTO-ENTMCNC: 32.3 G/DL (ref 32–36)
MCV RBC AUTO: 96 FL (ref 82–98)
MONOCYTES # BLD AUTO: 1.2 K/UL (ref 0.3–1)
MONOCYTES NFR BLD: 11.3 % (ref 4–15)
NEUTROPHILS # BLD AUTO: 8.5 K/UL (ref 1.8–7.7)
NEUTROPHILS NFR BLD: 81.9 % (ref 38–73)
NRBC BLD-RTO: 0 /100 WBC
PHOSPHATE SERPL-MCNC: 3.1 MG/DL (ref 2.7–4.5)
PLATELET # BLD AUTO: 194 K/UL (ref 150–450)
PMV BLD AUTO: 12.9 FL (ref 9.2–12.9)
POCT GLUCOSE: 186 MG/DL (ref 70–110)
POCT GLUCOSE: 214 MG/DL (ref 70–110)
POCT GLUCOSE: 220 MG/DL (ref 70–110)
POTASSIUM SERPL-SCNC: 4.7 MMOL/L (ref 3.5–5.1)
PROT SERPL-MCNC: 5.8 G/DL (ref 6–8.4)
RBC # BLD AUTO: 2.7 M/UL (ref 4–5.4)
SODIUM SERPL-SCNC: 131 MMOL/L (ref 136–145)
WBC # BLD AUTO: 10.43 K/UL (ref 3.9–12.7)

## 2024-02-20 PROCEDURE — 20600001 HC STEP DOWN PRIVATE ROOM

## 2024-02-20 PROCEDURE — 83735 ASSAY OF MAGNESIUM: CPT | Performed by: INTERNAL MEDICINE

## 2024-02-20 PROCEDURE — 25000003 PHARM REV CODE 250

## 2024-02-20 PROCEDURE — 25000003 PHARM REV CODE 250: Performed by: STUDENT IN AN ORGANIZED HEALTH CARE EDUCATION/TRAINING PROGRAM

## 2024-02-20 PROCEDURE — 97530 THERAPEUTIC ACTIVITIES: CPT

## 2024-02-20 PROCEDURE — 80053 COMPREHEN METABOLIC PANEL: CPT | Performed by: INTERNAL MEDICINE

## 2024-02-20 PROCEDURE — 99233 SBSQ HOSP IP/OBS HIGH 50: CPT | Mod: ,,, | Performed by: NURSE PRACTITIONER

## 2024-02-20 PROCEDURE — 63600175 PHARM REV CODE 636 W HCPCS

## 2024-02-20 PROCEDURE — 25000003 PHARM REV CODE 250: Performed by: INTERNAL MEDICINE

## 2024-02-20 PROCEDURE — 84100 ASSAY OF PHOSPHORUS: CPT | Performed by: INTERNAL MEDICINE

## 2024-02-20 PROCEDURE — 85025 COMPLETE CBC W/AUTO DIFF WBC: CPT | Performed by: INTERNAL MEDICINE

## 2024-02-20 PROCEDURE — 36415 COLL VENOUS BLD VENIPUNCTURE: CPT | Performed by: INTERNAL MEDICINE

## 2024-02-20 RX ORDER — NIFEDIPINE 30 MG/1
30 TABLET, EXTENDED RELEASE ORAL DAILY
Status: DISCONTINUED | OUTPATIENT
Start: 2024-02-20 | End: 2024-02-24

## 2024-02-20 RX ADMIN — Medication 324 MG: at 08:02

## 2024-02-20 RX ADMIN — VENLAFAXINE 37.5 MG: 37.5 TABLET ORAL at 08:02

## 2024-02-20 RX ADMIN — DRONABINOL 5 MG: 2.5 CAPSULE ORAL at 05:02

## 2024-02-20 RX ADMIN — ATORVASTATIN CALCIUM 40 MG: 40 TABLET, FILM COATED ORAL at 10:02

## 2024-02-20 RX ADMIN — PANTOPRAZOLE SODIUM 40 MG: 40 TABLET, DELAYED RELEASE ORAL at 05:02

## 2024-02-20 RX ADMIN — DRONABINOL 5 MG: 2.5 CAPSULE ORAL at 06:02

## 2024-02-20 RX ADMIN — NIFEDIPINE 30 MG: 30 TABLET, FILM COATED, EXTENDED RELEASE ORAL at 05:02

## 2024-02-20 RX ADMIN — HEPARIN SODIUM 5000 UNITS: 5000 INJECTION INTRAVENOUS; SUBCUTANEOUS at 02:02

## 2024-02-20 RX ADMIN — INSULIN ASPART 2 UNITS: 100 INJECTION, SOLUTION INTRAVENOUS; SUBCUTANEOUS at 09:02

## 2024-02-20 RX ADMIN — PREGABALIN 75 MG: 75 CAPSULE ORAL at 10:02

## 2024-02-20 RX ADMIN — INSULIN ASPART 2 UNITS: 100 INJECTION, SOLUTION INTRAVENOUS; SUBCUTANEOUS at 10:02

## 2024-02-20 RX ADMIN — AMIODARONE HYDROCHLORIDE 400 MG: 200 TABLET ORAL at 10:02

## 2024-02-20 RX ADMIN — PANTOPRAZOLE SODIUM 40 MG: 40 TABLET, DELAYED RELEASE ORAL at 06:02

## 2024-02-20 RX ADMIN — HEPARIN SODIUM 5000 UNITS: 5000 INJECTION INTRAVENOUS; SUBCUTANEOUS at 06:02

## 2024-02-20 RX ADMIN — HEPARIN SODIUM 5000 UNITS: 5000 INJECTION INTRAVENOUS; SUBCUTANEOUS at 10:02

## 2024-02-20 RX ADMIN — AMIODARONE HYDROCHLORIDE 400 MG: 200 TABLET ORAL at 08:02

## 2024-02-20 NOTE — HPI
Lily Green is a 73 year old white woman with celiac disease, diabetes mellitus type 2 with polyneuropathy, hypertension, hyperlipidemia, history of peptic ulcer disease, depression, anxiety, history of hysterectomy in 1987, history of peritonitis due to diffuse colonic ischemia status post total colectomy and end ileostomy creation on 1/14/2023, evacuation of intraabdominal hematoma, takedown of left lateral ileostomy and recreation of right lateral end ileostomy on 1/16/2023, elective robotic ileostomy reversal and ileocolonic anastomosis on 8/7/2023, irritable bowel syndrome, end stage renal disease status post right internal jugular permanent dialysis catheter on 1/25/2023 removed on 1/28/2023 and replaced with left internal jugular permanent dialysis catheter (on hemodialysis Tuesday Thursday Saturday), right middle lobe carcinoid tumor status post robotic-assisted converted to right thoracotomy with therapeutic wedge resection and mediastinal lymph node dissection and repair of bleeding right inferior pulmonary vein on 1/2/2024, atrial fibrillation, [employer] Micra AV leadless cardiac pacemaker placement on 1/16/2024. She lives in [place], [place]. She is . Her primary care physician is Dr. Pavel Calixto.               She was hospitalized at [facility] from 1/2/2024 to 2/6/2024 for right middle lobe carcinoid tumor, parapneumonic effusion, atrial fibrillation with rapid ventricular response, tachy-eulogio syndrome, gastrointestinal bleeding while on heparin. She was discharged to [facility] skilled nursing facility. She was prescribed cefpodoxime and metronidazole for 13 days, fluconazole for 7 days, metoprolol tartrate, and venlafaxine.               She presented to [facility] Emergency Department on 2/12/2024 from the [facility] nursing [place] campus with increased work of breathing, dyspnea, and hypoxia with oxygen saturation of 70% on  2 liters/minute of supplemental oxygen. It occurred acutely on the day of presentation. Emergency medical services put her on CPAP with some improvement. She was put on BiPAP in the emergency department. She had tachycardia and tachypnea. Her last dialysis session was 2 days prior on Saturday. She reported cough and nausea. Labs showed BNP 2754 pg/mL, troponin 0.312 ng/mL. Venous blood gas showed pH 7.438, pCO2 38.9. EKG showed sinus tachycardia. Chest X-ray showed pleural effusions and possible multifocal pneumonia. Nephrology was consulted for emergent dialysis with goal of removing 3 liters. She was admitted to Critical Care Medicine.

## 2024-02-20 NOTE — NURSING
Nurses Note -- 4 Eyes      2/19/2024   9:12 PM      Skin assessed during: Transfer      [] No Altered Skin Integrity Present    []Prevention Measures Documented      [x] Yes- Altered Skin Integrity Present or Discovered   [x] LDA Added if Not in Epic (Describe Wound)   [] New Altered Skin Integrity was Present on Admit and Documented in LDA   [] Wound Image Taken    Wound Care Consulted? Yes    Attending Nurse:  ARABELLA Villanueva    Second RN/Staff Member:  YUDELKA Chua

## 2024-02-20 NOTE — NURSING TRANSFER
Nursing Transfer Note      2/19/2024   7:00 PM    Nurse giving handoff:mona thurston   Nurse receiving handoff: [facility] RN     Reason patient is being transferred: step down    Transfer To: [id number]    Transfer via bed    Transfer with cardiac monitoring and 2 L NC    Transported by  RN and PCT    Transfer Vital Signs:  Blood Pressure: 125/61 (88)  Heart Rate:86  O2:95%  Temperature:afebrile   Respirations: 20    Telemetry: Box Number [id number], Rate 80s, and Rhythm NS  Order for Tele Monitor? Yes    Additional Lines: Oxygen    4eyes on Skin: yes    Medicines sent: triad cream, insulin, mupirocin     Patient belongings transferred with patient: Yes    Chart send with patient: Yes    Notified: caregiver at bedside     Upon arrival to floor: cardiac monitor applied, patient oriented to room, call bell in reach, and bed in lowest position

## 2024-02-20 NOTE — SUBJECTIVE & OBJECTIVE
Interval History:   Stepped down from ICU yesterday after SLED completed, negative 600 ml/24h.    Electrolytes stable/non-emergent.      Review of patient's allergies indicates:   Allergen Reactions    Crestor [rosuvastatin] Swelling    Gluten protein      Current Facility-Administered Medications   Medication Frequency    0.9%  NaCl infusion Once    0.9%  NaCl infusion Continuous    acetaminophen tablet 650 mg Q4H PRN    albuterol-ipratropium 2.5 mg-0.5 mg/3 mL nebulizer solution 3 mL Q4H PRN    amiodarone tablet 400 mg BID    Followed by    [START ON 2/25/2024] amiodarone tablet 200 mg Daily    atorvastatin tablet 40 mg QHS    dextrose 10% bolus 125 mL 125 mL PRN    dextrose 10% bolus 250 mL 250 mL PRN    droNABinol capsule 5 mg BID AC    epoetin noble injection 3,400 Units Every Mon, Wed, Fri    ferrous gluconate tablet 324 mg Daily with breakfast    glucagon (human recombinant) injection 1 mg PRN    glucose chewable tablet 16 g PRN    glucose chewable tablet 24 g PRN    heparin (porcine) injection 5,000 Units Q8H    insulin aspart U-100 pen 0-10 Units QID (AC + HS) PRN    melatonin tablet 6 mg Nightly PRN    ondansetron injection 4 mg Q6H PRN    pantoprazole EC tablet 40 mg BID AC    pregabalin capsule 75 mg Every other day    sodium chloride 0.9% flush 10 mL PRN    venlafaxine tablet 37.5 mg Daily       Objective:     Vital Signs (Most Recent):  Temp: 97.8 °F (36.6 °C) (02/20/24 0805)  Pulse: 86 (02/20/24 1102)  Resp: 20 (02/20/24 0805)  BP: 134/85 (02/20/24 0805)  SpO2: 100 % (02/20/24 0805) Vital Signs (24h Range):  Temp:  [97.1 °F (36.2 °C)-98.6 °F (37 °C)] 97.8 °F (36.6 °C)  Pulse:  [79-92] 86  Resp:  [15-48] 20  SpO2:  [90 %-100 %] 100 %  BP: (118-160)/(56-85) 134/85     Weight: 67.6 kg (149 lb 0.5 oz) (02/20/24 1018)  Body mass index is 24.8 kg/m².  Body surface area is 1.76 meters squared.    I/O last 3 completed shifts:  In: 1044.9 [I.V.:1044.9]  Out: 1628 [Other:1628]     Physical Exam  Vitals and  nursing note reviewed.   Constitutional:       General: She is not in acute distress.     Appearance: She is normal weight. She is not ill-appearing, toxic-appearing or diaphoretic.      Comments: Appears frail   HENT:      Head: Normocephalic and atraumatic.   Cardiovascular:      Rate and Rhythm: Rhythm irregular.      Pulses: Normal pulses.      Heart sounds: Normal heart sounds. No murmur heard.     No friction rub. No gallop.   Pulmonary:      Comments: Moderately increased respiratory effort with bilateral crackles  Abdominal:      General: Abdomen is flat. Bowel sounds are normal. There is no distension.      Palpations: Abdomen is soft.      Tenderness: There is no abdominal tenderness. There is no guarding or rebound.   Musculoskeletal:         General: No swelling or tenderness.   Skin:     General: Skin is warm and dry.      Coloration: Skin is not jaundiced or pale.          Significant Labs:  CBC:   Recent Labs   Lab 02/20/24  0447   WBC 10.43   RBC 2.70*   HGB 8.4*   HCT 26.0*      MCV 96   MCH 31.1*   MCHC 32.3     CMP:   Recent Labs   Lab 02/20/24  0447   *   CALCIUM 8.5*   ALBUMIN 1.9*   PROT 5.8*   *   K 4.7   CO2 23      BUN 16   CREATININE 1.3   ALKPHOS 92   ALT 49*   AST 28   BILITOT 0.5

## 2024-02-20 NOTE — PLAN OF CARE
CM team spoke with patient, patients , and patients sister about discharge planning.  agreed that admitting back to [facility] (if re-accepted) would be the best option for patient at this time. SW sent updated clinical notes to [facility]. SW will follow.     GRAYSON Savage  [facility]  [phone]

## 2024-02-20 NOTE — PROGRESS NOTES
[facility] - Telemetry Stepdown  Nephrology  Progress Note    Patient Name: Lily Green  MRN: [id number]  Admission Date: 2/12/2024  Hospital Length of Stay: 8 days  Attending Provider: Ewa Meyer MD   Primary Care Physician: Pavel Calixto MD  Principal Problem:Acute hypoxemic respiratory failure    Subjective:     HPI: 73 year old female with a histoyr of ESRD(MW), HTN, HLD, T2DM, IBS, RML carcinoid tumor admitted to [facility] for hypoxic respiratory distress. Initially thought to be from volume overload, however emergent HD done overnight resulted in Afib with RVR. She initially presented with hypoxic resp failure requiring NC satting 70% with 2 liters. MICU was called and bipap was started with improvement in respiratory status.     She was previously admitted to [facility] and underwent a right thoracotomy for newly found RML carcinoid tumor. Course was complicated by right sided chest tube placement and new onset afib with RVR and GIB (EGD with gastric ulcers).     Outpatient ESRD  Hemodialysis  Outpatient nephrologist: Dr. Vyas  Outpatient center: [facility]  Dialysis schedule: [facility]  Last treatment: 2/10/24  Anuric: Minimal  Access: right tunnled dialysis cather; has left fistula, pending maturation      Interval History:   Stepped down from ICU yesterday after SLED completed, negative 600 ml/24h.    Electrolytes stable/non-emergent.      Review of patient's allergies indicates:   Allergen Reactions    Crestor [rosuvastatin] Swelling    Gluten protein      Current Facility-Administered Medications   Medication Frequency    0.9%  NaCl infusion Once    0.9%  NaCl infusion Continuous    acetaminophen tablet 650 mg Q4H PRN    albuterol-ipratropium 2.5 mg-0.5 mg/3 mL nebulizer solution 3 mL Q4H PRN    amiodarone tablet 400 mg BID    Followed by    [START ON 2/25/2024] amiodarone tablet 200 mg Daily    atorvastatin tablet 40 mg QHS    dextrose 10% bolus 125 mL 125 mL PRN    dextrose 10% bolus 250 mL 250  mL PRN    droNABinol capsule 5 mg BID AC    epoetin noble injection 3,400 Units Every Mon, Wed, Fri    ferrous gluconate tablet 324 mg Daily with breakfast    glucagon (human recombinant) injection 1 mg PRN    glucose chewable tablet 16 g PRN    glucose chewable tablet 24 g PRN    heparin (porcine) injection 5,000 Units Q8H    insulin aspart U-100 pen 0-10 Units QID (AC + HS) PRN    melatonin tablet 6 mg Nightly PRN    ondansetron injection 4 mg Q6H PRN    pantoprazole EC tablet 40 mg BID AC    pregabalin capsule 75 mg Every other day    sodium chloride 0.9% flush 10 mL PRN    venlafaxine tablet 37.5 mg Daily       Objective:     Vital Signs (Most Recent):  Temp: 97.8 °F (36.6 °C) (02/20/24 0805)  Pulse: 86 (02/20/24 1102)  Resp: 20 (02/20/24 0805)  BP: 134/85 (02/20/24 0805)  SpO2: 100 % (02/20/24 0805) Vital Signs (24h Range):  Temp:  [97.1 °F (36.2 °C)-98.6 °F (37 °C)] 97.8 °F (36.6 °C)  Pulse:  [79-92] 86  Resp:  [15-48] 20  SpO2:  [90 %-100 %] 100 %  BP: (118-160)/(56-85) 134/85     Weight: 67.6 kg (149 lb 0.5 oz) (02/20/24 1018)  Body mass index is 24.8 kg/m².  Body surface area is 1.76 meters squared.    I/O last 3 completed shifts:  In: 1044.9 [I.V.:1044.9]  Out: 1628 [Other:1628]     Physical Exam  Vitals and nursing note reviewed.   Constitutional:       General: She is not in acute distress.     Appearance: She is normal weight. She is not ill-appearing, toxic-appearing or diaphoretic.      Comments: Appears frail   HENT:      Head: Normocephalic and atraumatic.   Cardiovascular:      Rate and Rhythm: Rhythm irregular.      Pulses: Normal pulses.      Heart sounds: Normal heart sounds. No murmur heard.     No friction rub. No gallop.   Pulmonary:      Comments: Moderately increased respiratory effort with bilateral crackles  Abdominal:      General: Abdomen is flat. Bowel sounds are normal. There is no distension.      Palpations: Abdomen is soft.      Tenderness: There is no abdominal tenderness. There is  no guarding or rebound.   Musculoskeletal:         General: No swelling or tenderness.   Skin:     General: Skin is warm and dry.      Coloration: Skin is not jaundiced or pale.          Significant Labs:  CBC:   Recent Labs   Lab 02/20/24  0447   WBC 10.43   RBC 2.70*   HGB 8.4*   HCT 26.0*      MCV 96   MCH 31.1*   MCHC 32.3     CMP:   Recent Labs   Lab 02/20/24 0447   *   CALCIUM 8.5*   ALBUMIN 1.9*   PROT 5.8*   *   K 4.7   CO2 23      BUN 16   CREATININE 1.3   ALKPHOS 92   ALT 49*   AST 28   BILITOT 0.5        Assessment/Plan:     Pulmonary  * Acute hypoxemic respiratory failure  UF with RRT    Renal/  ESRD (end stage renal disease)  ESRD   Hypoxic resp failure - improved with 1.5 liter removal;   ECHO 2/12 with newly depressed EF of 10-15% from 60-65% one month prior    Plan/Recommendation  -No emergent need for RRT today, next session tomorrow  -Keep MAP > 65  -Keep hemoglobin > 7  -Strict ins and outs  -Avoid nephrotoxic agents if possible and renally dose medications  -Avoid drastic hemodynamic changes if possible  -1L fluid restrictions  -protein supplements with meals    Oncology  Anemia in ESRD (end-stage renal disease)  Goal hemoglobin in ESRD is 10-12  Hemoglobin   Date Value Ref Range Status   02/20/2024 8.4 (L) 12.0 - 16.0 g/dL Final     Iron   Date Value Ref Range Status   02/02/2024 52 30 - 160 ug/dL Final     Transferrin   Date Value Ref Range Status   02/02/2024 121 (L) 200 - 375 mg/dL Final     TIBC   Date Value Ref Range Status   02/02/2024 179 (L) 250 - 450 ug/dL Final     Saturated Iron   Date Value Ref Range Status   02/02/2024 29 20 - 50 % Final     Ferritin   Date Value Ref Range Status   02/02/2024 1,803 (H) 20.0 - 300.0 ng/mL Final     -Epo with HD      Neto Pitts, NP  Nephrology  [facility] - Telemetry Stepdown

## 2024-02-20 NOTE — PROGRESS NOTES
[facility] - Telemetry Stepdown  Critical Care Medicine  Progress Note    Patient Name: Lily Green  MRN: [id number]  Admission Date: 2/12/2024  Hospital Length of Stay: 8 days  Code Status: Full Code  Attending Provider: Ewa Meyer MD  Primary Care Provider: Pavel Calixto MD   Principal Problem: Acute hypoxemic respiratory failure    Subjective:     HPI:  Ms. Green is a 73 y.o. 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 She was last admitted from 1/2/24-2/5/24 (35 days) on the thoracic surgery service during which she underwent a right lower lobectomy for resection of her carcinoid tumor and MLND, ultimately requiring a thoracotomy. Post-op, she received a right sided chest tube. 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 Subsequent GIB and discovery of nonbleeding gastric ulcers on EGD made full anticoagulation high risk, so it was stopped. She was ultimately Dc'd to [facility] in [place] where she presents from now.     She presents today from SNF with increased work of breathing, dyspnea, and hypoxia, with oxygen saturation in the 70% on 2L. She became acutely short of breath today and was brought in by EMS after being put on CPAP in the field with some improvement. Placed on BIPAP in the ED. She continues to be tachycardic and tachypnic. She last completed a full session of HD on Saturday. She reports cough and nausea, but denies fever, chills, pain, chest pain, neck pain. She has little residual renal function . Unclear if she has been taking her Torsemide listed as her home med. Her workup was significant for BNP of 2754, Trop 0.312. VBG reveals pH 7.438/38.9/29. EKG reveals sinus tachycardia. CXR has evident pleural effusions and possible multifocal pneumonia. Nephro was consulted and  emergent HD orders were placed with goal of removing 3 L urgently. She will be admitted to MICU for hypoxic respiratory failure and increased work of breathing. CTA PE study is still pending at time of evaluation since patient is getting HD.     Hospital/ICU Course:  2/12:  Patient admitted for hypoxic respiratory failure.  Echocardiogram revealed new low LVEF with elevated troponins.  Cardiology consulted.  2/13:  Patient developed AFib with RVR this morning--started amiodarone infusion.  Restarted on SLED overnight.  CT findings with a couple fluid collections in the right paraesophageal space and azygoesophageal recess.  2/14:  CT Surgery consulted and recommended IR consultation for evaluation for drainage; unfortunately, no safe window for approach.  Interventional Cardiology deferring angiography due to respiratory status.  Palliative Care consulted.  10 hours SLED overnight with removal of 2.1 L. A couple episodes of RVR overnight--increasing metoprolol this morning.  2/15:  Clotted off on CRRT overnight and restarted.  Thoracentesis performed with removal of 1.45L--sent for analysis.  Developed re-expansion pulmonary edema and started on Airvo.  2/16:  Airvo weaned down to 50L40% this morning.  Transudative effusion on analysis, remaining studies pending.  Started on vasopressor support overnight to support volume removal with CRRT.  2/17:  Weaned off Airvo overnight, now on HFNC at 12 L/min.  On SLED overnight with a couple episodes of clotting off.  Tolerated SLED off norepinephrine.    2/18: Weaned to 4L this am. Feels weak and tired today  2/19: No significant changes    Interval History/Significant Events:  Doing better this morning.  Tolerated SLED overnight off norepinephrine.      Review of Systems   All other systems reviewed and are negative.    Objective:   VSS     Physical Exam  Vitals and nursing note reviewed.   Constitutional:       General: She is not in acute distress.     Appearance: She  is normal weight. She is not ill-appearing, toxic-appearing or diaphoretic.      Comments: Appears frail   HENT:      Head: Normocephalic and atraumatic.   Cardiovascular:      Rate and Rhythm: Rhythm irregular.      Pulses: Normal pulses.      Heart sounds: Normal heart sounds. No murmur heard.     No friction rub. No gallop.   Pulmonary:      Breath sounds: No stridor. No rhonchi or rales.   Abdominal:      General: Abdomen is flat. Bowel sounds are normal. There is no distension.      Palpations: Abdomen is soft.      Tenderness: There is no abdominal tenderness. There is no guarding or rebound.   Musculoskeletal:         General: No swelling or tenderness.   Skin:     General: Skin is warm and dry.      Coloration: Skin is not jaundiced or pale.            Vents:  Oxygen Concentration (%): 24 (02/18/24 2030)  Lines/Drains/Airways       Central Venous Catheter Line  Duration             Permacath right subclavian -- days         Hemodialysis Catheter 01/25/23 1501 right internal jugular 390 days              Peripheral Intravenous Line  Duration                  Hemodialysis AV Fistula 09/01/23 0800 Left forearm 172 days         Peripheral IV - Single Lumen 02/16/24 2215 18 G Anterior;Distal;Left Forearm 3 days                  Significant Labs:    CBC/Anemia Profile:  Recent Labs   Lab 02/19/24 0226    WBC 9.05    HGB 7.6*    HCT 24.2*        MCV 96    RDW 23.5*           Chemistries:  Recent Labs   Lab 02/19/24 0226 02/19/24  1515    * 132*    K 4.0 4.0    CL 97 100    CO2 20* 25    BUN 25* 12    CREATININE 1.9* 0.9    CALCIUM 7.9* 7.9*    ALBUMIN 1.9* 1.9*    PROT 5.6*  --     BILITOT 0.4  --     ALKPHOS 92  --     ALT 62*  --     AST 36  --     MG 2.2 1.8    PHOS 5.4* 2.0*          All pertinent labs within the past 24 hours have been reviewed.    Significant Imaging:  I have reviewed all pertinent imaging results/findings within the past 24 hours.    AB  Recent Labs   Lab 02/13/24  0956  02/14/24  0826   PH 7.399  --    PO2 35* 34*   PCO2 36.8  --    HCO3 22.8*  --    BE -2  --      Assessment/Plan:     Psychiatric  Generalized anxiety disorder  - Continue home venlafaxine    Pulmonary  * Acute hypoxemic respiratory failure  Patient with Hypoxic Respiratory failure which is Acute on chronic.  she is on home oxygen at 2 LPM. Supplemental oxygen was provided and noted-      .   Signs/symptoms of respiratory failure include- tachypnea, increased work of breathing, respiratory distress, and use of accessory muscles. Contributing diagnoses includes - Pleural effusion Labs and images were reviewed. Patient Has recent ABG, which has been reviewed. Will treat underlying causes and adjust management of respiratory failure as follows-     Patient without CHF on most recent Echo (01/12/24) which demonstrated EF 60-65%, no significant valvulopathy, borderline pulm HTN. On torsemide at baseline. Patient reports having some high sodium foods lately. High suspicion for hypervolemia given BNP elevation, though this may be confounded by CKD.     - Improved w/ volume removal. Down to 2L and will likely be able to come down further today  - Repeat echocardiogram with new LVEF 15-20% with concerns for hypoperfusion  - Cardiology consulted  - Interventional Cardiology deferring coronary angiography due to respiratory status -- will need to re-involve    Pleural effusion on left  On Cefpodoxime and Flagyl until planned end date of 02/15/24 per ID recs. Given recent hospitalization and re-admission will start broad spectrum Abx.     - Cultures negative here  - Completed 7 days of vanc and zosyn      Cardiac/Vascular  New onset of congestive heart failure  Patient is identified as having Systolic (HFrEF) heart failure that is Acute. CHF is currently uncontrolled due to Rales/crackles on pulmonary exam and Pulmonary edema/pleural effusion on CXR. Latest ECHO performed and demonstrates- Results for orders placed during the  "hospital encounter of 02/12/24    Echo    Interpretation Summary    Left Ventricle: The left ventricle is normal in size. Normal wall thickness. Regional wall motion abnormalities present. See diagram for wall motion findings. There is severely reduced systolic function with a visually estimated ejection fraction of 15 - 20%. Ejection fraction by visual approximation is 15%. Unable to assess diastolic function due to atrial fibrillation.    Right Ventricle: Normal right ventricular cavity size. Wall thickness is normal. Right ventricle wall motion  is normal. Systolic function is normal.    Left Atrium: Left atrium is severely dilated.    Right Atrium: Right atrium is mildly dilated.    Aortic Valve: There is moderate aortic valve sclerosis. Mildly restricted motion.    Mitral Valve: There is moderate to severe regurgitation.    Tricuspid Valve: There is mild regurgitation.    IVC/SVC: Intermediate venous pressure at 8 mmHg.    Pericardium: There is a trivial effusion. Left pleural effusion.  . Continue Beta Blocker, ACE/ARB, and ARNI and monitor clinical status closely. Monitor on telemetry. Patient is on CHF pathway.  Monitor strict Is&Os and daily weights.  Place on fluid restriction of 1.5 L. Cardiology has been consulted. Continue to stress to patient importance of self efficacy and  on diet for CHF. Last BNP reviewed- and noted below   No results for input(s): "BNP", "BNPTRIAGEBLO" in the last 168 hours.    - Rediscuss w/ Cardiology    Atrial fibrillation  Continue home med lopressor. No therapeutic anticoagulation for now given GIB during previous hospitalization    - 2/13:  Developed AFib with RVR and started on amiodarone infusion  - 2/15:  Transitioning to oral amiodarone     Hyperlipidemia, mixed  - Continue home statin    Renal/  Chronic kidney disease-mineral and bone disorder  - Continue home Sevelamer    ESRD (end stage renal disease)  Patient reports making small volumes of urine. Last HD on " Saturday     - Nephro consulted for HD needs    Oncology  Anemia in ESRD (end-stage renal disease)  Has anemia of ESRD.     Endocrine  Moderate malnutrition  Nutrition consulted. Most recent weight and BMI monitored-     Measurements:  Wt Readings from Last 1 Encounters:   02/14/24 67.6 kg (149 lb)   Body mass index is 24.79 kg/m².    Patient has been screened and assessed by RD.    Malnutrition Type:  Context: chronic illness  Level: moderate    Malnutrition Characteristic Summary:  Energy Intake (Malnutrition): less than 75% for greater than or equal to 1 month  Muscle Mass (Malnutrition): mild depletion  Fluid Accumulation (Malnutrition): severe    Interventions/Recommendations (treatment strategy):  1. Continue gluten free diet with texture per SLP  2. modify ONS to [facility] renal TID  3. RD to monitor and follow      Type 2 diabetes mellitus with diabetic polyneuropathy, without long-term current use of insulin  Last A1C 4.9    - LD SSI, add scheduled detemir and aspart if needed    GI  GIB (gastrointestinal bleeding)  - Recent GIB on last admission with GI recommending 8 week course of Protonix s/p EGD with nonbleeding gastric ulcer. PPI end date 02/23/24 and follow up EGD planned at that time. Continue PPI while inpatient.       Bernabe Blas MD  Critical Care Medicine  [facility]

## 2024-02-20 NOTE — SUBJECTIVE & OBJECTIVE
Interval History/Significant Events:  Doing better this morning.  Tolerated SLED overnight off norepinephrine.      Review of Systems   All other systems reviewed and are negative.    Objective:   VSS     Physical Exam  Vitals and nursing note reviewed.   Constitutional:       General: She is not in acute distress.     Appearance: She is normal weight. She is not ill-appearing, toxic-appearing or diaphoretic.      Comments: Appears frail   HENT:      Head: Normocephalic and atraumatic.   Cardiovascular:      Rate and Rhythm: Rhythm irregular.      Pulses: Normal pulses.      Heart sounds: Normal heart sounds. No murmur heard.     No friction rub. No gallop.   Pulmonary:      Breath sounds: No stridor. No rhonchi or rales.   Abdominal:      General: Abdomen is flat. Bowel sounds are normal. There is no distension.      Palpations: Abdomen is soft.      Tenderness: There is no abdominal tenderness. There is no guarding or rebound.   Musculoskeletal:         General: No swelling or tenderness.   Skin:     General: Skin is warm and dry.      Coloration: Skin is not jaundiced or pale.            Vents:  Oxygen Concentration (%): 24 (02/18/24 2030)  Lines/Drains/Airways       Central Venous Catheter Line  Duration             Permacath right subclavian -- days         Hemodialysis Catheter 01/25/23 1501 right internal jugular 390 days              Peripheral Intravenous Line  Duration                  Hemodialysis AV Fistula 09/01/23 0800 Left forearm 172 days         Peripheral IV - Single Lumen 02/16/24 2215 18 G Anterior;Distal;Left Forearm 3 days                  Significant Labs:    CBC/Anemia Profile:  Recent Labs   Lab 02/19/24 0226    WBC 9.05    HGB 7.6*    HCT 24.2*        MCV 96    RDW 23.5*           Chemistries:  Recent Labs   Lab 02/19/24 0226 02/19/24  1515    * 132*    K 4.0 4.0    CL 97 100    CO2 20* 25    BUN 25* 12    CREATININE 1.9* 0.9    CALCIUM 7.9* 7.9*    ALBUMIN 1.9* 1.9*    PROT  5.6*  --     BILITOT 0.4  --     ALKPHOS 92  --     ALT 62*  --     AST 36  --     MG 2.2 1.8    PHOS 5.4* 2.0*          All pertinent labs within the past 24 hours have been reviewed.    Significant Imaging:  I have reviewed all pertinent imaging results/findings within the past 24 hours.

## 2024-02-20 NOTE — PLAN OF CARE
Problem: Adult Inpatient Plan of Care  Goal: Plan of Care Review  Outcome: Ongoing, Progressing     Problem: Infection (Hemodialysis)  Goal: Absence of Infection Signs and Symptoms  Outcome: Ongoing, Progressing

## 2024-02-20 NOTE — PROGRESS NOTES
[facility] - Telemetry Stepdown  Adult Nutrition  Progress Note    SUMMARY       Recommendations    Modify diet to Gluten Free; continue Novasource ONS.  RD to monitor & follow-up.    Goals: Meet % EEN, EPN by RD f/u  Nutrition Goal Status: progressing towards goal  Communication of RD Recs:  (POC)    Assessment and Plan    Moderate malnutrition    Malnutrition Type:  Context: chronic illness  Level: moderate    Related to (etiology):   Inadequate energy intake    Signs and Symptoms (as evidenced by):   Malnutrition Characteristic Summary:  Energy Intake (Malnutrition): less than 75% for greater than or equal to 1 month  Muscle Mass (Malnutrition): mild depletion  Fluid Accumulation (Malnutrition): severe    Interventions/Recommendations (treatment strategy):  Collaboration of nutrition care w/ other providers  ONS    Nutrition Diagnosis Status:   Continues     Malnutrition Assessment    Malnutrition Context: chronic illness  Malnutrition Level: moderate    Energy Intake (Malnutrition): less than 75% for greater than or equal to 1 month  Muscle Mass (Malnutrition): mild depletion  Fluid Accumulation (Malnutrition): severe   Orbital Region (Subcutaneous Fat Loss): well nourished  Upper Arm Region (Subcutaneous Fat Loss): well nourished   Evangelical Region (Muscle Loss): well nourished  Clavicle Bone Region (Muscle Loss): well nourished  Clavicle and Acromion Bone Region (Muscle Loss): well nourished  Patellar Region (Muscle Loss): mild depletion  Anterior Thigh Region (Muscle Loss): mild depletion  Posterior Calf Region (Muscle Loss): mild depletion     Reason for Assessment    Reason For Assessment: RD follow-up  Diagnosis: other (see comments) (Resp. fx)  Relevant Medical History: T2DM, HLD, ESRD, anemia, BIG, CHF  Interdisciplinary Rounds: did not attend    General Information Comments: Pt tolerating diet, consuming 25-50% of meals, trying to drink at least 1 ONS/day. Pt received SLED 2/19. Moderate malnutrition  "diagnosis continues - please see PES statement for details.  Nutrition Discharge Planning: Adequate PO intake    Nutrition/Diet History    Patient Reported Diet/Restrictions/Preferences: gluten-free, renal, diabetic diet  Spiritual, Cultural Beliefs, Baptism Practices, Values that Affect Care: no  Factors Affecting Nutritional Intake: decreased appetite    Anthropometrics    Temp: 97.8 °F (36.6 °C)  Height Method: Stated  Height: 5' 5" (165.1 cm)  Height (inches): 65 in  Weight Method: Bed Scale  Weight: 67.6 kg (149 lb 0.5 oz)  Weight (lb): 149.03 lb  Ideal Body Weight (IBW), Female: 125 lb  % Ideal Body Weight, Female (lb): 119.22 %  BMI (Calculated): 24.8  BMI Grade: 18.5-24.9 - normal    Lab/Procedures/Meds    Pertinent Labs Reviewed: reviewed  Pertinent Labs Comments: GFR 43.4, A1C 4.9  Pertinent Medications Reviewed: reviewed  Pertinent Medications Comments: Dronabinol    Estimated/Assessed Needs    Weight Used For Calorie Calculations: 67.6 kg (149 lb 0.5 oz)    Energy Calorie Requirements (kcal): 1690 kcal/d  Energy Need Method: Kcal/kg (25 kcal/kg)    Protein Requirements: 81 g/d (1.2 g/kg)  Weight Used For Protein Calculations: 67.6 kg (149 lb 0.5 oz)    Estimated Fluid Requirement Method: other (see comments) (Per MD)  RDA Method (mL): 1690    Nutrition Prescription Ordered    Current Diet Order: 2000 kcal ADA  Oral Nutrition Supplement: Novasource    Evaluation of Received Nutrient/Fluid Intake    I/O: -7.1L since admit    Comments: LBM: 2/19    Tolerance: tolerating    Nutrition Risk    Level of Risk/Frequency of Follow-up:  (1x/week)     Monitor and Evaluation    Food and Nutrient Intake: energy intake, food and beverage intake  Food and Nutrient Adminstration: diet order  Knowledge/Beliefs/Attitudes: beliefs and attitudes  Physical Activity and Function: nutrition-related ADLs and IADLs  Anthropometric Measurements: height/length, weight, weight change, body mass index  Biochemical Data, Medical " Tests and Procedures: electrolyte and renal panel, gastrointestinal profile, glucose/endocrine profile, inflammatory profile, lipid profile  Nutrition-Focused Physical Findings: overall appearance     Nutrition Follow-Up    RD Follow-up?: Yes

## 2024-02-20 NOTE — SUBJECTIVE & OBJECTIVE
Interval History: Stepped down to floor. Stable on 1L NC for comfort, no hypoxia noted. Working with PT/OT.     H/H stable.     Icards reconsulted for [facility] now that patient has clinically improved.   Renal following for HD.   Elevated BP, regimen adjusted.     Review of Systems   Constitutional:  Positive for fatigue.   Respiratory:  Negative for shortness of breath.    Cardiovascular:  Negative for leg swelling.   Gastrointestinal:  Negative for blood in stool.   Neurological:  Positive for headaches.     Objective:     Vital Signs (Most Recent):  Temp: 97.7 °F (36.5 °C) (02/20/24 1540)  Pulse: 89 (02/20/24 1544)  Resp: 20 (02/20/24 1129)  BP: (!) 170/108 (02/20/24 1544)  SpO2: 99 % (02/20/24 1540) Vital Signs (24h Range):  Temp:  [97.5 °F (36.4 °C)-98.6 °F (37 °C)] 97.7 °F (36.5 °C)  Pulse:  [83-92] 89  Resp:  [17-35] 20  SpO2:  [95 %-100 %] 99 %  BP: (125-170)/() 170/108     Weight: 67.6 kg (149 lb 0.5 oz)  Body mass index is 24.8 kg/m².  No intake or output data in the 24 hours ending 02/20/24 1746      Physical Exam  Vitals and nursing note reviewed.   Constitutional:       General: She is not in acute distress.     Appearance: She is ill-appearing.   HENT:      Head: Normocephalic and atraumatic.      Nose: Nose normal.      Comments: NC in place     Mouth/Throat:      Mouth: Mucous membranes are moist.      Pharynx: Oropharynx is clear.   Eyes:      Extraocular Movements: Extraocular movements intact.      Conjunctiva/sclera: Conjunctivae normal.      Pupils: Pupils are equal, round, and reactive to light.   Cardiovascular:      Rate and Rhythm: Normal rate and regular rhythm.      Pulses: Normal pulses.      Heart sounds: Normal heart sounds.      Comments: Tunneled cath c/d/i    Pulmonary:      Effort: No respiratory distress.      Breath sounds: Normal breath sounds.      Comments: Mild tachypnea, anterior lung fields clear, mild crackles posterior bases  Abdominal:      General: Abdomen is flat.  Bowel sounds are normal. There is no distension.      Palpations: Abdomen is soft.      Tenderness: There is no abdominal tenderness.   Musculoskeletal:         General: Normal range of motion.      Cervical back: Normal range of motion and neck supple.      Right lower leg: No edema.      Left lower leg: No edema.   Skin:     General: Skin is warm and dry.   Neurological:      General: No focal deficit present.      Mental Status: She is alert and oriented to person, place, and time.   Psychiatric:         Behavior: Behavior normal.             Significant Labs: All pertinent labs within the past 24 hours have been reviewed.    Significant Imaging: I have reviewed all pertinent imaging results/findings within the past 24 hours.

## 2024-02-20 NOTE — PLAN OF CARE
02/20/24 1404   Post-Acute Status   Post-Acute Authorization Placement   Post-Acute Placement Status Referrals Sent   Coverage Payor: [employer] MANAGED MEDICARE - [employer] MEDICARE PPO -   Hospital Resources/Appts/Education Provided Provided patient/caregiver with written discharge plan information   Patient choice form signed by patient/caregiver List with quality metrics by geographic area provided   Discharge Delays None known at this time   Discharge Plan   Discharge Plan A Skilled Nursing Facility   Discharge Plan B Home with family     SW spoke with patient, patients , and patients sister about discharge planning.  agreed pt returning to [facility] was the best option at this time. SW sent referral to [facility].    GRAYSON Savage  [facility] CM  [phone]

## 2024-02-20 NOTE — ASSESSMENT & PLAN NOTE
Nutrition consulted. Most recent weight and BMI monitored-     Measurements:  Wt Readings from Last 1 Encounters:   02/14/24 67.6 kg (149 lb)   Body mass index is 24.79 kg/m².    Patient has been screened and assessed by RD.    Malnutrition Type:  Context: chronic illness  Level: moderate    Malnutrition Characteristic Summary:  Energy Intake (Malnutrition): less than 75% for greater than or equal to 1 month  Muscle Mass (Malnutrition): mild depletion  Fluid Accumulation (Malnutrition): severe    Interventions/Recommendations (treatment strategy):  1. Continue gluten free diet with texture per SLP  2. [facility]  3. RD to monitor and follow

## 2024-02-20 NOTE — ASSESSMENT & PLAN NOTE
Patient with Hypoxic Respiratory failure which is Acute on chronic.  she is on home oxygen at 2 LPM. Supplemental oxygen was provided and noted-      .   Signs/symptoms of respiratory failure include- tachypnea, increased work of breathing, respiratory distress, and use of accessory muscles. Contributing diagnoses includes - Pleural effusion Labs and images were reviewed. Patient Has recent ABG, which has been reviewed. Will treat underlying causes and adjust management of respiratory failure as follows-     Patient without CHF on most recent Echo (01/12/24) which demonstrated EF 60-65%, no significant valvulopathy, borderline pulm HTN. On torsemide at baseline. Patient reports having some high sodium foods lately. High suspicion for hypervolemia given BNP elevation, though this may be confounded by CKD.     - Improved w/ volume removal. Down to 2L and will likely be able to come down further today  - Repeat echocardiogram with new LVEF 15-20% with concerns for hypoperfusion  - Cardiology consulted  - Interventional Cardiology deferring coronary angiography due to respiratory status -- will need to re-involve

## 2024-02-20 NOTE — HOSPITAL COURSE
Echocardiogram revealed new low left ventricular ejection fraction. Troponins remained elevated. Cardiology was consulted. She developed atrial fibrillation with rapid ventricular response on 2/13/2024. She was started on amiodarone infusion. She underwent slow low-efficiency dialysis (SLED). CT showed fluid collections in the right paraesophageal space and azygoesophageal recess. Cardiothoracic Surgery was consulted and recommended Interventional Radiology consult for drainage, but there was no safe window for approach. Interventional Cardiology deferred angiography due to respiratory failure. Palliative Care was consulted. Dialysis removed further fluid. Metoprolol was increased. Thoracentesis was performed on 2/15/2024 removing 1.45 liters, which was sent for analysis. She developed re-expansion pulmonary edema and was started on Airvo high flow oxygen. Airvo was weaned down to 50 liters/minute and FiO2 40% on 2/16/2024. Pleural effusion was transudative. She was started on vasopressor support. She was weaned to high flow nasal cannula at 12 liters/minute on 2/17/2024. She tolerated SLED without norepinephrine. She was weaned to 4 liters/minute on 2/18/2024. She was transferred to Hospital Medicine Team W on 2/20/2024. She was started on nifedipine. She worked with Physical and Occupational Therapy. Interventional Cardiology was reconsulted. Left heart catheterization showed luminal irregularities. Interventional Cardiology recommended starting aspirin 81 mg daily and atorvastatin 10 mg daily and consulting Heart Transplant Service (HTS) for management of nonischemic cardiomyopathy. She was already on atorvastatin 40 mg daily. Palliative Care signed off and planned to see her outpatient. She continued to require 4 liters/minute of supplemental oxygen. She was still short of breath. HTS was consulted and they said General Cardiology could be consulted. Cardiology recommended stopping nifedipine, starting  valsartan, and resuming her metoprolol. Nasal cannula was weaned to 2 liters/minute on 2/25/2024 but she started coughing. Repeat chest X-ray on 2/26/2024 paradoxically showed slight increase in left pleural effusion, as well as persistent mild pulmonary vascular congestion. Interventional Radiology was consulted for thoracentesis. They removed 725 mL of pleural fluid on 2/27/2024. Fluid was sent for cultures. She continued to feel short of breath with tachypnea on 2/28/2024. Net fluid status since admission was negative 13.8 liters at this point. Hypoxia resolved but she was coughing more. Chest CTA showed a new left lower lobe pneumonia. She was put on a course of doxycycline. Pharmacy recommended adding cefpodoxime. She was discharged to the skilled nursing facility on 3/1/2024.

## 2024-02-20 NOTE — PT/OT/SLP PROGRESS
Cardiology Occupational Therapy   Treatment    Name: Lily Green  MRN: [id number]  Admitting Diagnosis:  Acute hypoxemic respiratory failure       Recommendations:     Discharge Recommendations: Moderate Intensity Therapy  Discharge Equipment Recommendations:  walker, rolling  Barriers to discharge:  Other (Comment) (requires increased assist)    Assessment:     Lily Green is a 73 y.o. female with a medical diagnosis of Acute hypoxemic respiratory failure.  She presents with significant cues/training required for proper breathing techniques with all mobility on this date. Pt. Required assist for all mobility for safety. Education provided on importance of therapy/OOB.Patient would benefit from continued OT services to maximize level of safety and independence with self-care tasks.    . Performance deficits affecting function are weakness, impaired endurance, impaired self care skills, impaired functional mobility, gait instability, decreased upper extremity function, decreased lower extremity function, impaired cognition, impaired cardiopulmonary response to activity.     Rehab Prognosis:  Good; patient would benefit from acute skilled OT services to address these deficits and reach maximum level of function.       Plan:     Patient to be seen 4 x/week to address the above listed problems via self-care/home management, therapeutic activities, therapeutic exercises, neuromuscular re-education  Plan of Care Expires: 02/29/24  Plan of Care Reviewed with: patient, spouse    Subjective     Chief Complaint: Pt. Reporting that she needed to wait after rolling to her left side to catch her breath prior to sitting up EOB  Patient/Family Comments/goals: To get better. Pt. Has been sick for a few months  Pain/Comfort:  Pain Rating 1: 2/10  Location - Side 1: Left  Location 1: foot  Pain Addressed 1: Reposition, Distraction  Pain Rating Post-Intervention 1: 2/10  Pain Rating 2: 4/10  Location - Side 2: Right  Pain  Addressed 2: Reposition, Distraction  Pain Rating Post-Intervention 2: 4/10    Objective:     Communicated with: nurse prior to session.  Patient found supine with telemetry, oxygen upon OT entry to room.    General Precautions: Standard, fall    Orthopedic Precautions:N/A  Braces: N/A  Respiratory Status: Nasal cannula, flow 2 L/min     Occupational Performance:     Bed Mobility:    Patient completed Rolling/Turning to Left with  moderate assistance  Patient completed Scooting/Bridging with maximal assistance  Patient completed Supine to Sit with moderate assistance     Functional Mobility/Transfers:  Patient completed Sit <> Stand Transfer with moderate assistance  with  hand-held assist   Functional Mobility: Step transfer to bedside chair with Mod A and no AD    Activities of Daily Living:  Lower Body Dressing: total assistance to don/doff socks        Feeding: pt. Spouse feeding pt. But encouraged by OT to let pt. Feed herself    [facility] 6 Click ADL: 12    Treatment & Education:  Pt. Educated and practiced multiple trials of deep breathing techniques    Patient left up in chair with all lines intact, call button in reach, and charge nurse  notified    GOALS:   Multidisciplinary Problems       Occupational Therapy Goals          Problem: Occupational Therapy    Goal Priority Disciplines Outcome Interventions   Occupational Therapy Goal     OT, PT/OT Ongoing, Progressing    Description: Goals to be met by: 03-04-24     Patient will increase functional independence with ADLs by performing:    Feeding with Supervision.  UE Dressing with Minimal Assistance.  LE Dressing with Moderate Assistance.  Grooming while seated with Supervision.  Toileting from bedside commode with Moderate Assistance for hygiene and clothing management.   Supine to sit with Stand-by Assistance.  Stand pivot transfers with Contact Guard Assistance.  Step transfer with Contact Guard Assistance  Toilet transfer to bedside commode with Contact  Guard Assistance.  Pt. To be Independent with hEP to BUE to improve level of endurance                         Time Tracking:     OT Date of Treatment: 02/20/24  OT Start Time: 0915  OT Stop Time: 0948  OT Total Time (min): 33 min    Billable Minutes:Therapeutic Activity 33    OT/RICHARD: OT          2/20/2024

## 2024-02-20 NOTE — ASSESSMENT & PLAN NOTE
ESRD   Hypoxic resp failure - improved with 1.5 liter removal;   ECHO 2/12 with newly depressed EF of 10-15% from 60-65% one month prior    Plan/Recommendation  -No emergent need for RRT today, next session tomorrow  -Keep MAP > 65  -Keep hemoglobin > 7  -Strict ins and outs  -Avoid nephrotoxic agents if possible and renally dose medications  -Avoid drastic hemodynamic changes if possible  -1L fluid restrictions  -protein supplements with meals

## 2024-02-20 NOTE — ASSESSMENT & PLAN NOTE
Goal hemoglobin in ESRD is 10-12  Hemoglobin   Date Value Ref Range Status   02/20/2024 8.4 (L) 12.0 - 16.0 g/dL Final     Iron   Date Value Ref Range Status   02/02/2024 52 30 - 160 ug/dL Final     Transferrin   Date Value Ref Range Status   02/02/2024 121 (L) 200 - 375 mg/dL Final     TIBC   Date Value Ref Range Status   02/02/2024 179 (L) 250 - 450 ug/dL Final     Saturated Iron   Date Value Ref Range Status   02/02/2024 29 20 - 50 % Final     Ferritin   Date Value Ref Range Status   02/02/2024 1,803 (H) 20.0 - 300.0 ng/mL Final     -Epo with HD

## 2024-02-20 NOTE — ASSESSMENT & PLAN NOTE
"Patient is identified as having Systolic (HFrEF) heart failure that is Acute. CHF is currently uncontrolled due to Rales/crackles on pulmonary exam and Pulmonary edema/pleural effusion on CXR. Latest ECHO performed and demonstrates- Results for orders placed during the hospital encounter of 02/12/24    Echo    Interpretation Summary    Left Ventricle: The left ventricle is normal in size. Normal wall thickness. Regional wall motion abnormalities present. See diagram for wall motion findings. There is severely reduced systolic function with a visually estimated ejection fraction of 15 - 20%. Ejection fraction by visual approximation is 15%. Unable to assess diastolic function due to atrial fibrillation.    Right Ventricle: Normal right ventricular cavity size. Wall thickness is normal. Right ventricle wall motion  is normal. Systolic function is normal.    Left Atrium: Left atrium is severely dilated.    Right Atrium: Right atrium is mildly dilated.    Aortic Valve: There is moderate aortic valve sclerosis. Mildly restricted motion.    Mitral Valve: There is moderate to severe regurgitation.    Tricuspid Valve: There is mild regurgitation.    IVC/SVC: Intermediate venous pressure at 8 mmHg.    Pericardium: There is a trivial effusion. Left pleural effusion.  . Continue Beta Blocker, ACE/ARB, and ARNI and monitor clinical status closely. Monitor on telemetry. Patient is on CHF pathway.  Monitor strict Is&Os and daily weights.  Place on fluid restriction of 1.5 L. Cardiology has been consulted. Continue to stress to patient importance of self efficacy and  on diet for CHF. Last BNP reviewed- and noted below   No results for input(s): "BNP", "BNPTRIAGEBLO" in the last 168 hours.    - Rediscuss w/ Cardiology  "

## 2024-02-21 PROBLEM — E87.0 HYPERNATREMIA: Status: RESOLVED | Noted: 2024-02-21 | Resolved: 2024-02-21

## 2024-02-21 PROBLEM — E87.0 HYPERNATREMIA: Status: ACTIVE | Noted: 2024-02-21

## 2024-02-21 LAB
ABO + RH BLD: NORMAL
ALBUMIN SERPL BCP-MCNC: 1.9 G/DL (ref 3.5–5.2)
ALP SERPL-CCNC: 89 U/L (ref 55–135)
ALT SERPL W/O P-5'-P-CCNC: 33 U/L (ref 10–44)
ANION GAP SERPL CALC-SCNC: 9 MMOL/L (ref 8–16)
AST SERPL-CCNC: 17 U/L (ref 10–40)
BASOPHILS # BLD AUTO: 0.02 K/UL (ref 0–0.2)
BASOPHILS NFR BLD: 0.2 % (ref 0–1.9)
BILIRUB SERPL-MCNC: 0.3 MG/DL (ref 0.1–1)
BLD GP AB SCN CELLS X3 SERPL QL: NORMAL
BUN SERPL-MCNC: 35 MG/DL (ref 8–23)
CALCIUM SERPL-MCNC: 8.4 MG/DL (ref 8.7–10.5)
CHLORIDE SERPL-SCNC: 100 MMOL/L (ref 95–110)
CO2 SERPL-SCNC: 23 MMOL/L (ref 23–29)
CREAT SERPL-MCNC: 2.5 MG/DL (ref 0.5–1.4)
DIFFERENTIAL METHOD BLD: ABNORMAL
EOSINOPHIL # BLD AUTO: 0 K/UL (ref 0–0.5)
EOSINOPHIL NFR BLD: 0.3 % (ref 0–8)
ERYTHROCYTE [DISTWIDTH] IN BLOOD BY AUTOMATED COUNT: 23.3 % (ref 11.5–14.5)
EST. GFR  (NO RACE VARIABLE): 19.8 ML/MIN/1.73 M^2
GLUCOSE SERPL-MCNC: 160 MG/DL (ref 70–110)
HCT VFR BLD AUTO: 25.7 % (ref 37–48.5)
HGB BLD-MCNC: 7.9 G/DL (ref 12–16)
IMM GRANULOCYTES # BLD AUTO: 0.21 K/UL (ref 0–0.04)
IMM GRANULOCYTES NFR BLD AUTO: 2.3 % (ref 0–0.5)
LYMPHOCYTES # BLD AUTO: 0.6 K/UL (ref 1–4.8)
LYMPHOCYTES NFR BLD: 6.9 % (ref 18–48)
MAGNESIUM SERPL-MCNC: 2.1 MG/DL (ref 1.6–2.6)
MCH RBC QN AUTO: 30.3 PG (ref 27–31)
MCHC RBC AUTO-ENTMCNC: 30.7 G/DL (ref 32–36)
MCV RBC AUTO: 99 FL (ref 82–98)
MONOCYTES # BLD AUTO: 1.2 K/UL (ref 0.3–1)
MONOCYTES NFR BLD: 13.4 % (ref 4–15)
NEUTROPHILS # BLD AUTO: 6.9 K/UL (ref 1.8–7.7)
NEUTROPHILS NFR BLD: 76.9 % (ref 38–73)
NRBC BLD-RTO: 0 /100 WBC
OHS QRS DURATION: 90 MS
OHS QTC CALCULATION: 479 MS
PHOSPHATE SERPL-MCNC: 2.9 MG/DL (ref 2.7–4.5)
PLATELET # BLD AUTO: 211 K/UL (ref 150–450)
PMV BLD AUTO: 11.4 FL (ref 9.2–12.9)
POCT GLUCOSE: 115 MG/DL (ref 70–110)
POCT GLUCOSE: 189 MG/DL (ref 70–110)
POTASSIUM SERPL-SCNC: 4.3 MMOL/L (ref 3.5–5.1)
PROT SERPL-MCNC: 5.8 G/DL (ref 6–8.4)
RBC # BLD AUTO: 2.61 M/UL (ref 4–5.4)
SODIUM SERPL-SCNC: 132 MMOL/L (ref 136–145)
SPECIMEN OUTDATE: NORMAL
WBC # BLD AUTO: 9 K/UL (ref 3.9–12.7)

## 2024-02-21 PROCEDURE — 25000003 PHARM REV CODE 250

## 2024-02-21 PROCEDURE — 85025 COMPLETE CBC W/AUTO DIFF WBC: CPT | Performed by: INTERNAL MEDICINE

## 2024-02-21 PROCEDURE — 99233 SBSQ HOSP IP/OBS HIGH 50: CPT | Mod: GC,,, | Performed by: INTERNAL MEDICINE

## 2024-02-21 PROCEDURE — 36415 COLL VENOUS BLD VENIPUNCTURE: CPT | Mod: XB | Performed by: INTERNAL MEDICINE

## 2024-02-21 PROCEDURE — C1894 INTRO/SHEATH, NON-LASER: HCPCS | Performed by: INTERNAL MEDICINE

## 2024-02-21 PROCEDURE — 25000003 PHARM REV CODE 250: Performed by: INTERNAL MEDICINE

## 2024-02-21 PROCEDURE — 63600175 PHARM REV CODE 636 W HCPCS: Performed by: INTERNAL MEDICINE

## 2024-02-21 PROCEDURE — 97535 SELF CARE MNGMENT TRAINING: CPT | Mod: CO

## 2024-02-21 PROCEDURE — C1769 GUIDE WIRE: HCPCS | Performed by: INTERNAL MEDICINE

## 2024-02-21 PROCEDURE — 99152 MOD SED SAME PHYS/QHP 5/>YRS: CPT | Mod: ,,, | Performed by: INTERNAL MEDICINE

## 2024-02-21 PROCEDURE — 86901 BLOOD TYPING SEROLOGIC RH(D): CPT | Performed by: STUDENT IN AN ORGANIZED HEALTH CARE EDUCATION/TRAINING PROGRAM

## 2024-02-21 PROCEDURE — 20600001 HC STEP DOWN PRIVATE ROOM

## 2024-02-21 PROCEDURE — 97530 THERAPEUTIC ACTIVITIES: CPT | Mod: CO

## 2024-02-21 PROCEDURE — 83735 ASSAY OF MAGNESIUM: CPT | Performed by: INTERNAL MEDICINE

## 2024-02-21 PROCEDURE — 36415 COLL VENOUS BLD VENIPUNCTURE: CPT | Performed by: STUDENT IN AN ORGANIZED HEALTH CARE EDUCATION/TRAINING PROGRAM

## 2024-02-21 PROCEDURE — 99152 MOD SED SAME PHYS/QHP 5/>YRS: CPT | Performed by: INTERNAL MEDICINE

## 2024-02-21 PROCEDURE — 4A023N7 MEASUREMENT OF CARDIAC SAMPLING AND PRESSURE, LEFT HEART, PERCUTANEOUS APPROACH: ICD-10-PCS | Performed by: INTERNAL MEDICINE

## 2024-02-21 PROCEDURE — 93458 L HRT ARTERY/VENTRICLE ANGIO: CPT | Performed by: INTERNAL MEDICINE

## 2024-02-21 PROCEDURE — 63600175 PHARM REV CODE 636 W HCPCS

## 2024-02-21 PROCEDURE — 80053 COMPREHEN METABOLIC PANEL: CPT | Performed by: INTERNAL MEDICINE

## 2024-02-21 PROCEDURE — 25500020 PHARM REV CODE 255: Performed by: INTERNAL MEDICINE

## 2024-02-21 PROCEDURE — 99233 SBSQ HOSP IP/OBS HIGH 50: CPT | Mod: 95,,, | Performed by: CLINICAL NURSE SPECIALIST

## 2024-02-21 PROCEDURE — 84100 ASSAY OF PHOSPHORUS: CPT | Performed by: INTERNAL MEDICINE

## 2024-02-21 PROCEDURE — 93005 ELECTROCARDIOGRAM TRACING: CPT

## 2024-02-21 PROCEDURE — C1760 CLOSURE DEV, VASC: HCPCS | Performed by: INTERNAL MEDICINE

## 2024-02-21 PROCEDURE — 80100014 HC HEMODIALYSIS 1:1

## 2024-02-21 PROCEDURE — 93458 L HRT ARTERY/VENTRICLE ANGIO: CPT | Mod: 26,,, | Performed by: INTERNAL MEDICINE

## 2024-02-21 PROCEDURE — 93010 ELECTROCARDIOGRAM REPORT: CPT | Mod: ,,, | Performed by: INTERNAL MEDICINE

## 2024-02-21 PROCEDURE — B2111ZZ FLUOROSCOPY OF MULTIPLE CORONARY ARTERIES USING LOW OSMOLAR CONTRAST: ICD-10-PCS | Performed by: INTERNAL MEDICINE

## 2024-02-21 PROCEDURE — 97530 THERAPEUTIC ACTIVITIES: CPT

## 2024-02-21 PROCEDURE — 94761 N-INVAS EAR/PLS OXIMETRY MLT: CPT

## 2024-02-21 PROCEDURE — 97110 THERAPEUTIC EXERCISES: CPT

## 2024-02-21 PROCEDURE — 99232 SBSQ HOSP IP/OBS MODERATE 35: CPT | Mod: ,,, | Performed by: NURSE PRACTITIONER

## 2024-02-21 PROCEDURE — 63600175 PHARM REV CODE 636 W HCPCS: Performed by: STUDENT IN AN ORGANIZED HEALTH CARE EDUCATION/TRAINING PROGRAM

## 2024-02-21 RX ORDER — ASPIRIN 325 MG
325 TABLET ORAL ONCE
Status: DISCONTINUED | OUTPATIENT
Start: 2024-02-21 | End: 2024-02-21

## 2024-02-21 RX ORDER — LIDOCAINE HYDROCHLORIDE 20 MG/ML
INJECTION, SOLUTION INFILTRATION; PERINEURAL
Status: DISCONTINUED | OUTPATIENT
Start: 2024-02-21 | End: 2024-02-21 | Stop reason: HOSPADM

## 2024-02-21 RX ORDER — CEFAZOLIN SODIUM 1 G/3ML
INJECTION, POWDER, FOR SOLUTION INTRAMUSCULAR; INTRAVENOUS
Status: DISCONTINUED | OUTPATIENT
Start: 2024-02-21 | End: 2024-02-21 | Stop reason: HOSPADM

## 2024-02-21 RX ORDER — DIPHENHYDRAMINE HCL 50 MG
50 CAPSULE ORAL ONCE
Status: DISCONTINUED | OUTPATIENT
Start: 2024-02-21 | End: 2024-02-21

## 2024-02-21 RX ORDER — ONDANSETRON 8 MG/1
8 TABLET, ORALLY DISINTEGRATING ORAL EVERY 8 HOURS PRN
Status: DISCONTINUED | OUTPATIENT
Start: 2024-02-21 | End: 2024-03-01 | Stop reason: HOSPADM

## 2024-02-21 RX ORDER — MIDAZOLAM HYDROCHLORIDE 1 MG/ML
INJECTION INTRAMUSCULAR; INTRAVENOUS
Status: DISCONTINUED | OUTPATIENT
Start: 2024-02-21 | End: 2024-02-21 | Stop reason: HOSPADM

## 2024-02-21 RX ORDER — SODIUM CHLORIDE 0.9 % (FLUSH) 0.9 %
10 SYRINGE (ML) INJECTION
Status: DISCONTINUED | OUTPATIENT
Start: 2024-02-21 | End: 2024-03-01 | Stop reason: HOSPADM

## 2024-02-21 RX ORDER — DIPHENHYDRAMINE HYDROCHLORIDE 50 MG/ML
25 INJECTION INTRAMUSCULAR; INTRAVENOUS ONCE
Status: COMPLETED | OUTPATIENT
Start: 2024-02-21 | End: 2024-02-21

## 2024-02-21 RX ORDER — ACETAMINOPHEN 325 MG/1
650 TABLET ORAL EVERY 4 HOURS PRN
Status: DISCONTINUED | OUTPATIENT
Start: 2024-02-21 | End: 2024-03-01 | Stop reason: HOSPADM

## 2024-02-21 RX ORDER — ASPIRIN 325 MG
325 TABLET ORAL ONCE
Status: DISCONTINUED | OUTPATIENT
Start: 2024-02-21 | End: 2024-02-22

## 2024-02-21 RX ADMIN — HEPARIN SODIUM 5000 UNITS: 5000 INJECTION INTRAVENOUS; SUBCUTANEOUS at 11:02

## 2024-02-21 RX ADMIN — DIPHENHYDRAMINE HYDROCHLORIDE 25 MG: 50 INJECTION, SOLUTION INTRAMUSCULAR; INTRAVENOUS at 03:02

## 2024-02-21 RX ADMIN — PANTOPRAZOLE SODIUM 40 MG: 40 TABLET, DELAYED RELEASE ORAL at 06:02

## 2024-02-21 RX ADMIN — AMIODARONE HYDROCHLORIDE 400 MG: 200 TABLET ORAL at 08:02

## 2024-02-21 RX ADMIN — INSULIN ASPART 2 UNITS: 100 INJECTION, SOLUTION INTRAVENOUS; SUBCUTANEOUS at 09:02

## 2024-02-21 RX ADMIN — ATORVASTATIN CALCIUM 40 MG: 40 TABLET, FILM COATED ORAL at 08:02

## 2024-02-21 RX ADMIN — HEPARIN SODIUM 5000 UNITS: 5000 INJECTION INTRAVENOUS; SUBCUTANEOUS at 06:02

## 2024-02-21 RX ADMIN — ERYTHROPOIETIN 3400 UNITS: 4000 INJECTION, SOLUTION INTRAVENOUS; SUBCUTANEOUS at 09:02

## 2024-02-21 RX ADMIN — DRONABINOL 5 MG: 2.5 CAPSULE ORAL at 06:02

## 2024-02-21 NOTE — PROGRESS NOTES
[facility] - Telemetry Stepdown  Palliative Medicine  Progress Note    Patient Name: Lily Green  MRN: [id number]  Admission Date: 2/12/2024  Hospital Length of Stay: 8 days  Code Status: Full Code   Attending Provider: Ewa Meyer MD  Consulting Provider: EMORY Borrego  Primary Care Physician: Pavel Calixto MD  Principal Problem:Acute hypoxemic respiratory failure    Patient information was obtained from spouse/SO, relative(s), and primary team.      Assessment/Plan:     Palliative Care  Palliative care encounter  Palliative medicine consulted for goals of care care, advanced care planning with focus on resuscitation status as discussed with primary team NP for Mrs. Green a 74 yo lady admitted to critical care with hypoxic respiratory failure with increased work of breathing.  At time of this encounter Mrs. Green is awake and more alert, does not participate in conversation.  CRRT in iuse , no behavioral signs of pain.   Supplemental oxygen in use     Advance Care Planning    Date: 02/16/2024  - No ACP documents received   - ACP education provided   - Patient did not wish or was not able to name a surrogate decision maker or provide an Advance Care Plan.  -  Deric Green is next of kin for medical decisions [phone]   -  continues to discuss resuscitation status with family   - full code per primary team.      Advance Care Planning    Date: 02/16/2024    Goals of care   - Pal med APRN and MARILYN Alves LCSW returned to bedside.   at bedside.   -  , Deric,  and sister Sweetie. Deric reports having a lengthy conversation with his in- laws.   -  states the patient would not choose heroic measures and states he and the family are amenable to cpr but not to pounding on the chest and are amenable to gentle shocks with defibrillator  - Palliative medicine APRN explained CPR is a  heroic measures that requires chest compressions at a depth deep enough to  compress the heart and send blood to the body especially her vital organs.  Further explained defibrilation only occurs when there is a shockable rhythm.    - Discussed Mrs. Green's survivability to CPR and expressed concern that Mrs. Green would not survive CPR as she she has multiple organ failure - lungs, kidney and heart.  - DNR order recommended and explained what this means for Mrs. Green - a peaceful and natural death with all care continued.  -  and sister are not amenable to DNR.  Both state if her heart stops beating she should have every chance of recovery including cpr and mechanical ventilation   -  further clarifies She, Mrs. Green would not be amenable to long term life support.   -  defines this as not longer than 2 weeks. At that time if the patient has no reasonable chance of recovery he would be amenable to comfort care.    -  The family endorses that what is most important right now is to focus on extending life as long as possible, even it it means sacrificing quality and curative/life-prolongation (regardless of treatment burdens)        Symptom Management     Pain   - no c/o pain  - no pain meds given     Dyspnea   - shortness of breath on exertion noted  - supplemental oxygen in use   - oxygen sat 98 - 100%  - patient has fan at bedside     Hyponatremia Acute hypoxic respiratory failure/ESRD on CKD/  new diagnosis CHF/ afib with RVR  - as per primary team and specialty consults   - Na level remains low despite aggressive fluid removal   - supplemental oxygen in use - see dyspnea above   - bilateral pleural effusion antibiotics continued   - CRRT in progress  - amiodarone drip   - anticipating step down to hospital medicine   - pal med following for goals of care     Plan  - continue current plan of care  - patient and family would benefit from discussions regarding prognosis in order to develop goals of care   - pal med will continue to follow and provide additional  guidance for advanced care planning and completion of acp documents    Primary team, Dr. Blas,  notified of the above.            I will follow-up with patient. Please contact us if you have any additional questions.    Subjective:     Chief Complaint:   Chief Complaint   Patient presents with    Shortness of Breath     Arrives on CPAP       HPI:   HPI obtained from chart review:      As per H&P Mrs. Green is a 72 yo lady with PMH of:  ESRD (TTS), benign essential tremor, Meniere's disease, HTN, HLD, DM2, GERD, gastric ulcer, Celiac's, IBS  and non-smoker. Recently diagnosed with  RML Carcinoid tumor.     Recently admitted to [facility] 1/2/24-2/5/24 (35 days) on the thoracic surgery service  post  right lower lobectomy for resection of her carcinoid tumor and MLND, ultimately requiring a thoracotomy. Post-op, she received a right sided chest tube. 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 Subsequent GIB and discovery of nonbleeding gastric ulcers on EGD made full anticoagulation high risk, so it was stopped. She was ultimately Dc'd to [facility] in [place] where she presents from now.      She presented to [facility] from [facility]  SNF via EMS  with  c/o increased work of breathing, dyspnea, and hypoxia, with oxygen saturation in the 70% on 2L.  Required CPAP in the field.   Placed on BIPAP in the ED. Remained with tachypnea and tachycardia.  Last HD on Saturday.  Reports c/o cough and nausea,  residual urinary retention.  No c/o  fever, chills, pain, chest pain, neck pain. Unable to determine if patient has been taking home meds  Torsemide as ordered.     ED labs significant for BNP of 2754, Trop 0.312. VBG reveals pH 7.438/38.9/29.   EKG reveals sinus tachycardia.   CXR has evident pleural effusions and possible multifocal pneumonia.     Nephro was consulted and emergent HD completed. 3 liters removed. Admitted to MICU for hypoxic  respiratory failure and increased work of breathing.     CTA PE Impression:     1. No convincing evidence of acute pulmonary embolism.  2. Bilateral pleural effusions, pericardial effusion, diffuse body wall edema, and suggested pulmonary vascular congestion/interstitial edema.  Correlation with patient volume status recommended.  3. There remains soft tissue fullness in the lower right paraesophageal soft tissues measures on the order of 24 x 30 mm transaxial dimension, relatively similar to slightly decreased in fullness when compared to the 2024 chest CT. Persistent inflammatory change related to previous identified as azygoesophageal recess gas containing collection identified on the 2024 abdomen and pelvis CT to be considered. Attention on follow-up would be recommended.  4. Additional details, as provided in the body of the report.     Pal med consulted for goals of care and advanced care planning              Hospital Course:  No notes on file    Interval History: no RRT today,  states will do later in the day,  family not amenable to dnr order.  Anticipating step down to hospital medicine.     Past Medical History:   Diagnosis Date    Anxiety     Celiac disease 2018    Celiac disease     Depression     Diabetes mellitus     Family history of breast cancer in mother      at age 68    Hyperlipidemia     Hypertension     Meniere disease     Meniere's disease, unspecified ear     Menopause     Peptic ulcer     Reflux esophagitis     Urinary tract infection     Vaginal infection     Vaginal Pap smear 2014    Pap/Hpv Negative        Past Surgical History:   Procedure Laterality Date    APPENDECTOMY      BREAST SURGERY      Tags    CARPAL TUNNEL RELEASE Bilateral 2017    ,     CLOSURE, COLOSTOMY Left 2023    Procedure: CLOSURE, COLOSTOMY;  Surgeon: Manuel Javier MD;  Location: [facility];  Service: General;  Laterality: Left;    COLONOSCOPY  2018    Normal   (Cornell Velaczo)     COLOSTOMY Right 1/16/2023    Procedure: CREATION, COLOSTOMY;  Surgeon: Manuel Javier MD;  Location: [facility] OR;  Service: General;  Laterality: Right;    DILATION AND CURETTAGE OF UTERUS  1986    DUPUYTREN CONTRACTURE RELEASE Bilateral 09/2017    ESOPHAGOGASTRODUODENOSCOPY N/A 1/24/2024    Procedure: EGD (ESOPHAGOGASTRODUODENOSCOPY);  Surgeon: Yamilet Walters MD;  Location: [facility] ENDO (2ND FLR);  Service: Gastroenterology;  Laterality: N/A;    ESOPHAGOGASTRODUODENOSCOPY N/A 1/26/2024    Procedure: EGD (ESOPHAGOGASTRODUODENOSCOPY);  Surgeon: Yamilet Walters MD;  Location: [facility] ENDO (2ND FLR);  Service: Gastroenterology;  Laterality: N/A;    HYSTERECTOMY  1987    BEAU w/ appy, no BSO     IMPLANTATION OF LEADLESS PACEMAKER N/A 1/16/2024    Procedure: INSERTION, CARDIAC PACEMAKER, LEADLESS;  Surgeon: LENIN Frances MD;  Location: [facility] EP LAB;  Service: Cardiology;  Laterality: N/A;  SB, LEADLESS PPM (MICRA), MDT, ANES, EH, CVICU [id number]    INJECTION OF NEUROLYTIC AGENT AROUND LUMBAR SYMPATHETIC NERVE N/A 1/6/2022    Procedure: BLOCK, LUMBAR SYMPATHETIC;  Surgeon: Souleymane Rizo Jr., MD;  Location: [facility] PAIN MGT;  Service: Pain Management;  Laterality: N/A;  no pacemaker   pt is diabetic     INJECTION OF NEUROLYTIC AGENT AROUND LUMBAR SYMPATHETIC NERVE N/A 8/25/2022    Procedure: BLOCK, LUMBAR SYMPATHETIC;  Surgeon: Souleymane Rizo Jr., MD;  Location: [facility] PAIN MGT;  Service: Pain Management;  Laterality: N/A;  diabetic     INSERTION OF TUNNELED CENTRAL VENOUS HEMODIALYSIS CATHETER Right 1/25/2023    Procedure: INSERTION, CATHETER, HEMODIALYSIS, DUAL LUMEN;  Surgeon: Manuel Javier MD;  Location: [facility] OR;  Service: General;  Laterality: Right;    INSERTION, CATHETER, TUNNELED Left 1/28/2023    Procedure: Insertion,catheter,tunneled;  Surgeon: Watson Fontenot MD;  Location: [facility] OR;  Service: General;  Laterality: Left;    LAPAROTOMY, EXPLORATORY N/A 1/14/2023    Procedure: LAPAROTOMY,  EXPLORATORY;  Surgeon: Manuel Javier MD;  Location: [facility] OR;  Service: General;  Laterality: N/A;    LAPAROTOMY, EXPLORATORY N/A 1/16/2023    Procedure: LAPAROTOMY, EXPLORATORY;  Surgeon: Manuel Javier MD;  Location: [facility] OR;  Service: General;  Laterality: N/A;    LYMPHADENECTOMY Right 1/2/2024    Procedure: LYMPHADENECTOMY;  Surgeon: Tan Thomson MD;  Location: [facility] OR [facility] FLR;  Service: Cardiothoracic;  Laterality: Right;    PACEMAKER, TEMPORARY, TRANSVENOUS, PEDIATRIC Right 1/12/2024    Procedure: Pacemaker, Temporary, Transvenous;  Surgeon: Todd Carlisle MD;  Location: [facility] CATH LAB;  Service: Cardiology;  Laterality: Right;    REMOVAL OF CATHETER Right 1/28/2023    Procedure: REMOVAL, CATHETER;  Surgeon: Watson Fontenot MD;  Location: [facility] OR;  Service: General;  Laterality: Right;    REMOVAL, TUNNELED CATH Right 1/25/2023    Procedure: REMOVAL, TUNNELED CATH;  Surgeon: Manuel Javier MD;  Location: [facility] OR;  Service: General;  Laterality: Right;    ROBOTIC BRONCHOSCOPY N/A 10/20/2023    Procedure: ROBOTIC BRONCHOSCOPY;  Surgeon: Mayda Monzon MD;  Location: [facility] OR 2ND FLR;  Service: Pulmonary;  Laterality: N/A;    SHOULDER SURGERY  2009 & 2010    right rotator cuff    THORACOTOMY Right 1/2/2024    Procedure: THORACOTOMY;  Surgeon: Tan Thomson MD;  Location: [facility] OR 2ND FLR;  Service: Cardiothoracic;  Laterality: Right;    THORACOTOMY, WITH INITIAL THERAPEUTIC WEDGE RESECTION OF LUNG Right 1/2/2024    Procedure: THORACOTOMY, WITH INITIAL THERAPEUTIC WEDGE RESECTION OF LUNG;  Surgeon: Tan Thomson MD;  Location: [facility] OR 2ND FLR;  Service: Cardiothoracic;  Laterality: Right;    TONSILLECTOMY      UPPER GASTROINTESTINAL ENDOSCOPY  04/2018       Review of patient's allergies indicates:   Allergen Reactions    Crestor [rosuvastatin] Swelling    Gluten protein        Medications:  Continuous Infusions:      Scheduled Meds:   sodium chloride 0.9%   Intravenous Once     amiodarone  400 mg Oral BID    Followed by    [START ON 2/25/2024] amiodarone  200 mg Oral Daily    atorvastatin  40 mg Oral QHS    droNABinol  5 mg Oral BID AC    epoetin noble  50 Units/kg Subcutaneous Every Mon, Wed, Fri    ferrous gluconate  324 mg Oral Daily with breakfast    heparin (porcine)  5,000 Units Subcutaneous Q8H    NIFEdipine  30 mg Oral Daily    pantoprazole  40 mg Oral BID AC    pregabalin  75 mg Oral Every other day    venlafaxine  37.5 mg Oral Daily     PRN Meds:acetaminophen, albuterol-ipratropium, dextrose 10%, dextrose 10%, glucagon (human recombinant), glucose, glucose, insulin aspart U-100, melatonin, ondansetron, sodium chloride 0.9%    Family History       Problem Relation (Age of Onset)    Arthritis Father    Breast cancer Mother, Paternal Aunt    Cancer Mother, Paternal Aunt    Diabetes Paternal Grandfather    Hyperlipidemia Sister    Vision loss Father, Mother, Sister          Tobacco Use    Smoking status: Never    Smokeless tobacco: Never   Substance and Sexual Activity    Alcohol use: No    Drug use: Never    Sexual activity: Not Currently     Partners: Male     Birth control/protection: See Surgical Hx     Comment: :        Review of Systems   Respiratory:  Positive for shortness of breath.    Genitourinary:         Urinary retention    Neurological:  Positive for weakness.     Objective:     Vital Signs (Most Recent):  Temp: 98.1 °F (36.7 °C) (02/20/24 1944)  Pulse: 82 (02/20/24 1944)  Resp: 20 (02/20/24 1129)  BP: 138/80 (02/20/24 1944)  SpO2: 100 % (02/20/24 1944) Vital Signs (24h Range):  Temp:  [97.5 °F (36.4 °C)-98.4 °F (36.9 °C)] 98.1 °F (36.7 °C)  Pulse:  [82-92] 82  Resp:  [18-20] 20  SpO2:  [99 %-100 %] 100 %  BP: (127-170)/() 138/80     Weight: 67.6 kg (149 lb 0.5 oz)  Body mass index is 24.8 kg/m².       Physical Exam  Vitals and nursing note reviewed.   Constitutional:       Appearance: She is ill-appearing.   Cardiovascular:      Rate and Rhythm:  Tachycardia present.   Pulmonary:      Comments: Dyspnea, supplemental oxygen   Abdominal:      General: There is no distension.   Genitourinary:     Comments: CRRT in progress   Skin:     General: Skin is warm.   Neurological:      Mental Status: She is alert.        Review of Symptoms      Symptom Assessment (ESAS 0-10 Scale)  Pain:  0  Dyspnea:  0  Anxiety:  0  Nausea:  0  Depression:  0  Anorexia:  0  Fatigue:  0  Insomnia:  0  Restlessness:  0  Agitation:  0 due to Acuity of condition         Pain Assessment in Advanced Demential Scale (PAINAD)   Breathing - Independent of vocalization:  0  Negative vocalization:  0  Facial expression:  1  Body language:  1  Consolability:  0  Total:  2    Performance Status:  60    Living Arrangements:  Lives with spouse    Psychosocial/Cultural:   See Palliative Psychosocial Note: Yes  **Primary  to Follow**  Palliative Care  Consult: Yes    Spiritual:  F - Deepika and Belief:  Pentecostalism   A - Address in Care:   following       Advance Care Planning  Advance Directives:   Living Will: No    LaPOST: No    Do Not Resuscitate Status: No    Medical Power of : No      Decision Making:  Family answered questions and Patient unable to communicate due to disease severity/cognitive impairment  Goals of Care: What is most important right now is to focus on extending life as long as possible, even it it means sacrificing quality, curative/life-prolongation (regardless of treatment burdens). Accordingly, we have decided that the best plan to meet the patient's goals includes continuing with treatment.         Significant Labs: All pertinent labs within the past 24 hours have been reviewed.  CBC:   Recent Labs   Lab 02/20/24 0447   WBC 10.43   HGB 8.4*   HCT 26.0*   MCV 96          BMP:  Recent Labs   Lab 02/20/24 0447   *   *   K 4.7      CO2 23   BUN 16   CREATININE 1.3   CALCIUM 8.5*   MG 2.0       LFT:  Lab Results    Component Value Date    AST 28 02/20/2024    ALKPHOS 92 02/20/2024    BILITOT 0.5 02/20/2024     Albumin:   Albumin   Date Value Ref Range Status   02/20/2024 1.9 (L) 3.5 - 5.2 g/dL Final     Protein:   Total Protein   Date Value Ref Range Status   02/20/2024 5.8 (L) 6.0 - 8.4 g/dL Final     Lactic acid:   Lab Results   Component Value Date    LACTATE 1.8 02/12/2024    LACTATE 1.6 01/05/2024       Significant Imaging: I have reviewed all pertinent imaging results/findings within the past 24 hours.   30 mins spent in advanced care planning     Dona Hughes, CNS  Palliative Medicine  [facility] - Telemetry Stepdown

## 2024-02-21 NOTE — PLAN OF CARE
Problem: Device-Related Complication Risk (Hemodialysis)  Goal: Safe, Effective Therapy Delivery  Outcome: Ongoing, Progressing     Problem: Hemodynamic Instability (Hemodialysis)  Goal: Effective Tissue Perfusion  Outcome: Ongoing, Progressing     Problem: Infection (Hemodialysis)  Goal: Absence of Infection Signs and Symptoms  Outcome: Ongoing, Progressing     Problem: Adult Inpatient Plan of Care  Goal: Plan of Care Review  Outcome: Ongoing, Progressing  Goal: Patient-Specific Goal (Individualized)  Outcome: Ongoing, Progressing  Goal: Absence of Hospital-Acquired Illness or Injury  Outcome: Ongoing, Progressing  Goal: Optimal Comfort and Wellbeing  Outcome: Ongoing, Progressing  Goal: Readiness for Transition of Care  Outcome: Ongoing, Progressing     Problem: Device-Related Complication Risk (CRRT (Continuous Renal Replacement Therapy))  Goal: Safe, Effective Therapy Delivery  Outcome: Ongoing, Progressing     Problem: Hypothermia (CRRT (Continuous Renal Replacement Therapy))  Goal: Body Temperature Maintained in Desired Range  Outcome: Ongoing, Progressing     Problem: Infection (CRRT (Continuous Renal Replacement Therapy))  Goal: Absence of Infection Signs and Symptoms  Outcome: Ongoing, Progressing     Problem: Diabetes Comorbidity  Goal: Blood Glucose Level Within Targeted Range  Outcome: Ongoing, Progressing     Problem: Fluid Imbalance (Pneumonia)  Goal: Fluid Balance  Outcome: Ongoing, Progressing     Problem: Infection (Pneumonia)  Goal: Resolution of Infection Signs and Symptoms  Outcome: Ongoing, Progressing     Problem: Respiratory Compromise (Pneumonia)  Goal: Effective Oxygenation and Ventilation  Outcome: Ongoing, Progressing     Problem: Infection  Goal: Absence of Infection Signs and Symptoms  Outcome: Ongoing, Progressing     Problem: Impaired Wound Healing  Goal: Optimal Wound Healing  Outcome: Ongoing, Progressing     Problem: Skin Injury Risk Increased  Goal: Skin Health and  Integrity  Outcome: Ongoing, Progressing     Problem: Coping Ineffective  Goal: Effective Coping  Outcome: Ongoing, Progressing

## 2024-02-21 NOTE — ASSESSMENT & PLAN NOTE
Creatine stable for now. BMP reviewed- noted Estimated Creatinine Clearance: 18 mL/min (A) (based on SCr of 2.5 mg/dL (H)). according to latest data. Based on current GFR, CKD stage is end stage.  Monitor UOP and serial BMP and adjust therapy as needed. Renally dose meds. Avoid nephrotoxic medications and procedures.  - cont sevelamer  - renal following

## 2024-02-21 NOTE — PROGRESS NOTES
[facility] - Telemetry Stepdown  Palliative Medicine  Progress Note    Patient Name: Lily Green  MRN: [id number]  Admission Date: 2/12/2024  Hospital Length of Stay: 9 days  Code Status: Full Code   Attending Provider: Ewa Meyer MD  Consulting Provider: EMORY Borrego  Primary Care Physician: Pavel Calixto MD  Principal Problem:Acute hypoxemic respiratory failure    Patient information was obtained from spouse/SO and primary team.      Assessment/Plan:     Palliative Care  Palliative care encounter  Palliative medicine consulted for goals of care care, advanced care planning with focus on resuscitation status as discussed with primary team NP for Mrs. Green a 74 yo lady admitted to critical care with hypoxic respiratory failure with increased work of breathing.  At time of this encounter Mrs. Green is awake and more alert, sitting in chair and answers simple questions today. no behavioral signs of pain.   Supplemental oxygen in use     Advance Care Planning    Date: 02/16/2024  - No ACP documents received   - ACP education provided   - Patient did not wish or was not able to name a surrogate decision maker or provide an Advance Care Plan.  -   Deric Green is next of kin for medical decisions [phone]   - full code per primary team. Family is not amenable to any change in resuscitation status       Advance Care Planning    Date: 02/16/2024    Goals of care   - Pal med MAIK and MARILYN Alves LCSW returned to bedside.   at bedside.   -    The family endorses that what is most important right now is to focus on extending life as long as possible, even it it means sacrificing quality and curative/life-prolongation (regardless of treatment burdens)  - Family is hopeful Mrs Green will continue to improve and meet criteria to resume skilled nursing care.    - amenable to follow up in outpatient pal med clinic - pal med will facilitate referral         Symptom Management      Pain   - no c/o pain  - no pain meds given     Dyspnea   - shortness of breath on exertion noted  - supplemental oxygen in use   - oxygen sat 98 - 100%  - patient has fan at bedside     Hyponatremia Acute hypoxic respiratory failure/ESRD on CKD/  new diagnosis CHF/ afib with RVR  - as per primary team and specialty consults   - Na level remains  132   - supplemental oxygen in use - see dyspnea above   - bilateral pleural effusion antibiotics continued   - no need for RRT today, nephrology following   - amiodarone changed to oral   - step down to hospital medicine   - pal med following for goals of care     Plan  - continue current plan of care  - patient and family would benefit from discussions regarding prognosis in order to develop goals of care   - pal med will continue to follow and provide additional guidance for advanced care planning and completion of acp documents    Primary team, Dr. Meyer aware of the above via secure chat message            I will sign off. Please contact us if you have any additional questions.    Subjective:     Chief Complaint:   Chief Complaint   Patient presents with    Shortness of Breath     Arrives on CPAP       HPI:   HPI obtained from chart review:      As per H&P Mrs. Green is a 74 yo lady with PMH of:  ESRD (TTS), benign essential tremor, Meniere's disease, HTN, HLD, DM2, GERD, gastric ulcer, Celiac's, IBS  and non-smoker. Recently diagnosed with  RML Carcinoid tumor.     Recently admitted to [facility] 1/2/24-2/5/24 (35 days) on the thoracic surgery service  post  right lower lobectomy for resection of her carcinoid tumor and MLND, ultimately requiring a thoracotomy. Post-op, she received a right sided chest tube. 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 Subsequent GIB and discovery of nonbleeding gastric ulcers on EGD made full anticoagulation high risk, so it was stopped. She was ultimately  Dc'd to [facility] in [place] where she presents from now.      She presented to [facility] from [facility]  SNF via EMS  with  c/o increased work of breathing, dyspnea, and hypoxia, with oxygen saturation in the 70% on 2L.  Required CPAP in the field.   Placed on BIPAP in the ED. Remained with tachypnea and tachycardia.  Last HD on Saturday.  Reports c/o cough and nausea,  residual urinary retention.  No c/o  fever, chills, pain, chest pain, neck pain. Unable to determine if patient has been taking home meds  Torsemide as ordered.     ED labs significant for BNP of 2754, Trop 0.312. VBG reveals pH 7.438/38.9/29.   EKG reveals sinus tachycardia.   CXR has evident pleural effusions and possible multifocal pneumonia.     Nephro was consulted and emergent HD completed. 3 liters removed. Admitted to MICU for hypoxic respiratory failure and increased work of breathing.     CTA PE Impression:     1. No convincing evidence of acute pulmonary embolism.  2. Bilateral pleural effusions, pericardial effusion, diffuse body wall edema, and suggested pulmonary vascular congestion/interstitial edema.  Correlation with patient volume status recommended.  3. There remains soft tissue fullness in the lower right paraesophageal soft tissues measures on the order of 24 x 30 mm transaxial dimension, relatively similar to slightly decreased in fullness when compared to the 01/13/2024 chest CT. Persistent inflammatory change related to previous identified as azygoesophageal recess gas containing collection identified on the 01/08/2024 abdomen and pelvis CT to be considered. Attention on follow-up would be recommended.  4. Additional details, as provided in the body of the report.     Pal med consulted for goals of care and advanced care planning              Hospital Course:  No notes on file    Interval History:   Stepped down to hospital medicine no urgent need for RRRT, Na 132. Patient more alert and unable to participate complex  conversations   Past Medical History:   Diagnosis Date    Anxiety     Celiac disease 2018    Celiac disease     Depression     Diabetes mellitus     Family history of breast cancer in mother      at age 68    Hyperlipidemia     Hypertension     Meniere disease     Meniere's disease, unspecified ear     Menopause     Peptic ulcer     Peritonitis 2023    Reflux esophagitis     Urinary tract infection     Vaginal infection     Vaginal Pap smear 2014    Pap/Hpv Negative        Past Surgical History:   Procedure Laterality Date    APPENDECTOMY      BREAST SURGERY      Tags    CARPAL TUNNEL RELEASE Bilateral 2017    ,     CLOSURE, COLOSTOMY Left 2023    Procedure: CLOSURE, COLOSTOMY;  Surgeon: Manuel Javier MD;  Location: [facility] OR;  Service: General;  Laterality: Left;    COLONOSCOPY  2018    Normal  ( Juan A)     COLOSTOMY Right 2023    Procedure: CREATION, COLOSTOMY;  Surgeon: Manuel Javier MD;  Location: [facility] OR;  Service: General;  Laterality: Right;    DILATION AND CURETTAGE OF UTERUS  1986    DUPUYTREN CONTRACTURE RELEASE Bilateral 2017    ESOPHAGOGASTRODUODENOSCOPY N/A 2024    Procedure: EGD (ESOPHAGOGASTRODUODENOSCOPY);  Surgeon: Yamilet Walters MD;  [facility]  Service: Gastroenterology;  Laterality: N/A;    ESOPHAGOGASTRODUODENOSCOPY N/A 2024    Procedure: EGD (ESOPHAGOGASTRODUODENOSCOPY);  Surgeon: Yamilet Walters MD;  [address] [facility] [address] [address]  Service: Gastroenterology;  Laterality: N/A;    HYSTERECTOMY      BEAU w/ appy, no BSO     IMPLANTATION OF LEADLESS PACEMAKER N/A 2024    Procedure: INSERTION, CARDIAC PACEMAKER, LEADLESS;  Surgeon: LENIN Frances MD;  Location: [facility] EP LAB;  Service: Cardiology;  Laterality: N/A;  SB, LEADLESS PPM (MICRA), MDT, ANES, EH, CVICU [id number]    INJECTION OF NEUROLYTIC AGENT AROUND LUMBAR SYMPATHETIC NERVE N/A 2022    Procedure: BLOCK, LUMBAR SYMPATHETIC;  Surgeon:  Souleymane Rizo Jr., MD;  Location: [facility] PAIN MGT;  Service: Pain Management;  Laterality: N/A;  no pacemaker   pt is diabetic     INJECTION OF NEUROLYTIC AGENT AROUND LUMBAR SYMPATHETIC NERVE N/A 8/25/2022    Procedure: BLOCK, LUMBAR SYMPATHETIC;  Surgeon: Souleymane Rizo Jr., MD;  Location: [facility] PAIN MGT;  Service: Pain Management;  Laterality: N/A;  diabetic     INSERTION OF TUNNELED CENTRAL VENOUS HEMODIALYSIS CATHETER Right 1/25/2023    Procedure: INSERTION, CATHETER, HEMODIALYSIS, DUAL LUMEN;  Surgeon: Manuel Javier MD;  Location: [facility] OR;  Service: General;  Laterality: Right;    INSERTION, CATHETER, TUNNELED Left 1/28/2023    Procedure: Insertion,catheter,tunneled;  Surgeon: Watson Fontenot MD;  Location: [facility] OR;  Service: General;  Laterality: Left;    LAPAROTOMY, EXPLORATORY N/A 1/14/2023    Procedure: LAPAROTOMY, EXPLORATORY;  Surgeon: Manuel Javier MD;  Location: [facility] OR;  Service: General;  Laterality: N/A;    LAPAROTOMY, EXPLORATORY N/A 1/16/2023    Procedure: LAPAROTOMY, EXPLORATORY;  Surgeon: Manuel Javier MD;  Location: [facility] OR;  Service: General;  Laterality: N/A;    LYMPHADENECTOMY Right 1/2/2024    Procedure: LYMPHADENECTOMY;  Surgeon: Tan Thomson MD;  Location: [address];  Service: Cardiothoracic;  Laterality: Right;    PACEMAKER, TEMPORARY, TRANSVENOUS, PEDIATRIC Right 1/12/2024    Procedure: Pacemaker, Temporary, Transvenous;  Surgeon: Todd Carlisle MD;  Location: [facility] CATH LAB;  Service: Cardiology;  Laterality: Right;    REMOVAL OF CATHETER Right 1/28/2023    Procedure: REMOVAL, CATHETER;  Surgeon: Watson Fontenot MD;  Location: [facility] OR;  Service: General;  Laterality: Right;    REMOVAL, TUNNELED CATH Right 1/25/2023    Procedure: REMOVAL, TUNNELED CATH;  Surgeon: Manuel Javier MD;  Location: [facility] OR;  Service: General;  Laterality: Right;    ROBOTIC BRONCHOSCOPY N/A 10/20/2023    Procedure: ROBOTIC BRONCHOSCOPY;  Surgeon: Mayda Monzon MD;   [facility];  Service: Pulmonary;  Laterality: N/A;    SHOULDER SURGERY  2009 & 2010    right rotator cuff    THORACOTOMY Right 1/2/2024    Procedure: THORACOTOMY;  Surgeon: Tan Thomson MD;  [facility];  Service: Cardiothoracic;  Laterality: Right;    THORACOTOMY, WITH INITIAL THERAPEUTIC WEDGE RESECTION OF LUNG Right 1/2/2024    Procedure: THORACOTOMY, WITH INITIAL THERAPEUTIC WEDGE RESECTION OF LUNG;  Surgeon: Tan Thomson MD;  [facility];  Service: Cardiothoracic;  Laterality: Right;    TONSILLECTOMY      UPPER GASTROINTESTINAL ENDOSCOPY  04/2018       Review of patient's allergies indicates:   Allergen Reactions    Crestor [rosuvastatin] Swelling    Gluten protein        Medications:  Continuous Infusions:      Scheduled Meds:   sodium chloride 0.9%   Intravenous Once    amiodarone  400 mg Oral BID    Followed by    [START ON 2/25/2024] amiodarone  200 mg Oral Daily    aspirin  325 mg Oral Once    atorvastatin  40 mg Oral QHS    droNABinol  5 mg Oral BID AC    epoetin noble  50 Units/kg Subcutaneous Every Mon, Wed, Fri    ferrous gluconate  324 mg Oral Daily with breakfast    heparin (porcine)  5,000 Units Subcutaneous Q8H    NIFEdipine  30 mg Oral Daily    pantoprazole  40 mg Oral BID AC    pregabalin  75 mg Oral Every other day    venlafaxine  37.5 mg Oral Daily     PRN Meds:acetaminophen, albuterol-ipratropium, dextrose 10%, dextrose 10%, glucagon (human recombinant), glucose, glucose, insulin aspart U-100, melatonin, ondansetron, sodium chloride 0.9%, sodium chloride 0.9%    Family History       Problem Relation (Age of Onset)    Arthritis Father    Breast cancer Mother, Paternal Aunt    Cancer Mother, Paternal Aunt    Diabetes Paternal Grandfather    Hyperlipidemia Sister    Vision loss Father, Mother, Sister          Tobacco Use    Smoking status: Never    Smokeless tobacco: Never   Substance and Sexual Activity    Alcohol use: No    Drug use: Never     Sexual activity: Not Currently     Partners: Male     Birth control/protection: See Surgical Hx     Comment: :        Review of Systems   Respiratory:  Positive for shortness of breath.    Genitourinary:         Urinary retention    Neurological:  Positive for weakness.     Objective:     Vital Signs (Most Recent):  Temp: 97.7 °F (36.5 °C) (02/21/24 0751)  Pulse: 75 (02/21/24 1107)  Resp: 18 (02/21/24 0751)  BP: 117/70 (02/21/24 0751)  SpO2: 100 % (02/21/24 0751) Vital Signs (24h Range):  Temp:  [96.8 °F (36 °C)-98.1 °F (36.7 °C)] 97.7 °F (36.5 °C)  Pulse:  [72-89] 75  Resp:  [18-20] 18  SpO2:  [99 %-100 %] 100 %  BP: (117-170)/() 117/70     Weight: 67.6 kg (149 lb 0.5 oz)  Body mass index is 24.8 kg/m².       Physical Exam  Vitals and nursing note reviewed.   Constitutional:       Appearance: She is ill-appearing.   Cardiovascular:      Rate and Rhythm: Tachycardia present.   Pulmonary:      Comments: Dyspnea, supplemental oxygen   Abdominal:      General: There is no distension.   Genitourinary:     Comments: CRRT in progress   Skin:     General: Skin is warm.   Neurological:      Mental Status: She is alert.          Review of Symptoms      Symptom Assessment (ESAS 0-10 Scale)  Pain:  0  Dyspnea:  0  Anxiety:  0  Nausea:  0  Depression:  0  Anorexia:  0  Fatigue:  0  Insomnia:  0  Restlessness:  0  Agitation:  0 due to Acuity of condition         Pain Assessment in Advanced Demential Scale (PAINAD)   Breathing - Independent of vocalization:  0  Negative vocalization:  0  Facial expression:  1  Body language:  1  Consolability:  0  Total:  2    Performance Status:  60    Living Arrangements:  Lives with spouse    Psychosocial/Cultural:   See Palliative Psychosocial Note: Yes  **Primary  to Follow**  Palliative Care  Consult: Yes    Spiritual:  F - Deepika and Belief:  Rastafarian   A - Address in Care:   following         Advance Care Planning  Advance Directives:    Living Will: No    LaPOST: No    Do Not Resuscitate Status: No    Medical Power of : No      Decision Making:  Family answered questions and Patient unable to communicate due to disease severity/cognitive impairment  Goals of Care: What is most important right now is to focus on extending life as long as possible, even it it means sacrificing quality, curative/life-prolongation (regardless of treatment burdens). Accordingly, we have decided that the best plan to meet the patient's goals includes continuing with treatment.         Significant Labs: All pertinent labs within the past 24 hours have been reviewed.  CBC:   Recent Labs   Lab 02/21/24  0746   WBC 9.00   HGB 7.9*   HCT 25.7*   MCV 99*          BMP:  Recent Labs   Lab 02/21/24  0746   *   *   K 4.3      CO2 23   BUN 35*   CREATININE 2.5*   CALCIUM 8.4*   MG 2.1       LFT:  Lab Results   Component Value Date    AST 17 02/21/2024    ALKPHOS 89 02/21/2024    BILITOT 0.3 02/21/2024     Albumin:   Albumin   Date Value Ref Range Status   02/21/2024 1.9 (L) 3.5 - 5.2 g/dL Final     Protein:   Total Protein   Date Value Ref Range Status   02/21/2024 5.8 (L) 6.0 - 8.4 g/dL Final     Lactic acid:   Lab Results   Component Value Date    LACTATE 1.8 02/12/2024    LACTATE 1.6 01/05/2024       Significant Imaging: I have reviewed all pertinent imaging results/findings within the past 24 hours.      Dona Hughes, CNS  Palliative Medicine  [facility] - Telemetry Stepdown

## 2024-02-21 NOTE — ASSESSMENT & PLAN NOTE
Patient's FSGs are controlled on current medication regimen.  Last A1c reviewed-   Lab Results   Component Value Date    HGBA1C 4.9 11/15/2023     Most recent fingerstick glucose reviewed-   Recent Labs   Lab 02/20/24  1713 02/20/24  2245 02/21/24  0937   POCTGLUCOSE 214* 220* 189*       Current correctional scale  Low  Maintain anti-hyperglycemic dose as follows-   Antihyperglycemics (From admission, onward)    Start     Stop Route Frequency Ordered    02/15/24 1054  insulin aspart U-100 pen 0-10 Units         -- SubQ Before meals & nightly PRN 02/15/24 1055        Hold Oral hypoglycemics while patient is in the hospital.

## 2024-02-21 NOTE — ASSESSMENT & PLAN NOTE
Patient's anemia is currently controlled. Has not received any PRBCs to date. Etiology likely d/t chronic disease due to Chronic Kidney Disease/ESRD  Current CBC reviewed-   Lab Results   Component Value Date    HGB 7.9 (L) 02/21/2024    HCT 25.7 (L) 02/21/2024     Monitor serial CBC and transfuse if patient becomes hemodynamically unstable, symptomatic or H/H drops below 7/21.

## 2024-02-21 NOTE — ASSESSMENT & PLAN NOTE
"Patient is identified as having Systolic (HFrEF) heart failure that is Acute. CHF is currently uncontrolled due to Rales/crackles on pulmonary exam and Pulmonary edema/pleural effusion on CXR. Latest ECHO performed and demonstrates- Results for orders placed during the hospital encounter of 02/12/24    Echo    Interpretation Summary    Left Ventricle: The left ventricle is normal in size. Normal wall thickness. Normal wall motion. There is severely reduced systolic function with a visually estimated ejection fraction of 20 - 25%. There is diastolic dysfunction.    Right Ventricle: Normal right ventricular cavity size. Wall thickness is normal. Right ventricle wall motion has segmental dysfunction with akinesis of the mid to distal free wall. Systolic function is moderately reduced.    Left Atrium: Left atrium is moderately dilated.    Aortic Valve: Aortic valve thickening. There is moderate aortic valve sclerosis. There is annular calcification present.    IVC/SVC: Normal venous pressure at 3 mmHg.    Pericardium: Left pleural effusion.  monitor clinical status closely. Monitor on telemetry. Patient is on CHF pathway.  Monitor strict Is&Os and daily weights.  Place on fluid restriction of 1 L. Cardiology has been consulted. Continue to stress to patient importance of self efficacy and  on diet for CHF. Last BNP reviewed- and noted below No results for input(s): "BNP", "BNPTRIAGEBLO" in the last 168 hours.  - will clarify if patient tolerated ARB BB as previously recommended by cardiology,she came out of ICU with those meds held  "

## 2024-02-21 NOTE — PROGRESS NOTES
[facility] Medicine  Progress Note    Patient Name: Lily Green  MRN: [id number]  Patient Class: IP- Inpatient   Admission Date: 2/12/2024  Length of Stay: 9 days  Attending Physician: Ewa Meyer MD  Primary Care Provider: Pavel Calixto MD        Subjective:     Principal Problem:Acute hypoxemic respiratory failure        HPI:  Ms. Green is a 73 y.o. 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 She was last admitted from 1/2/24-2/5/24 (35 days) on the thoracic surgery service during which she underwent a right lower lobectomy for resection of her carcinoid tumor and MLND, ultimately requiring a thoracotomy. Post-op, she received a right sided chest tube. 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 Subsequent GIB and discovery of nonbleeding gastric ulcers on EGD made full anticoagulation high risk, so it was stopped. She was ultimately Dc'd to [facility] in [place] where she presents from now.      She presents today from SNF with increased work of breathing, dyspnea, and hypoxia, with oxygen saturation in the 70% on 2L. She became acutely short of breath today and was brought in by EMS after being put on CPAP in the field with some improvement. Placed on BIPAP in the ED. She continues to be tachycardic and tachypnic. She last completed a full session of HD on Saturday. She reports cough and nausea, but denies fever, chills, pain, chest pain, neck pain. She has little residual renal function . Unclear if she has been taking her Torsemide listed as her home med. Her workup was significant for BNP of 2754, Trop 0.312. VBG reveals pH 7.438/38.9/29. EKG reveals sinus tachycardia. CXR has evident pleural effusions and possible multifocal pneumonia. Nephro was consulted and  emergent HD orders were placed with goal of removing 3 L urgently. She will be admitted to MICU for hypoxic respiratory failure and increased work of breathing. CTA PE study is still pending at time of evaluation since patient is getting HD.     Overview/Hospital Course:  2/12:  Patient admitted for hypoxic respiratory failure.  Echocardiogram revealed new low LVEF with elevated troponins.  Cardiology consulted.  2/13:  Patient developed AFib with RVR this morning--started amiodarone infusion.  Restarted on SLED overnight.  CT findings with a couple fluid collections in the right paraesophageal space and azygoesophageal recess.  2/14:  CT Surgery consulted and recommended IR consultation for evaluation for drainage; unfortunately, no safe window for approach.  Interventional Cardiology deferring angiography due to respiratory status.  Palliative Care consulted.  10 hours SLED overnight with removal of 2.1 L. A couple episodes of RVR overnight--increasing metoprolol this morning.  2/15:  Clotted off on CRRT overnight and restarted.  Thoracentesis performed with removal of 1.45L--sent for analysis.  Developed re-expansion pulmonary edema and started on Airvo.  2/16:  Airvo weaned down to 50L40% this morning.  Transudative effusion on analysis, remaining studies pending.  Started on vasopressor support overnight to support volume removal with CRRT.  2/17:  Weaned off Airvo overnight, now on HFNC at 12 L/min.  On SLED overnight with a couple episodes of clotting off.  Tolerated SLED off norepinephrine.    2/18: Weaned to 4L this am. Feels weak and tired today  2/19: No significant changes    Stepped down to HM 2/20. Stable on 1L for comfort, no hypoxia noted. Nephrology following for HD. Working with PT/OT. Interventional cardiology consulted for [facility], plan for [facility] 2/21 +/- PCI. Palliative was following and now signed off, will see pt in clinic.     Interval History: No concerns. LHC today, HD today  Stable on 1L NC for  comfort, no hypoxia noted.   Working with PT/OT.     H/H stable.     Renal following for HD.     Review of Systems   Constitutional:  Positive for fatigue.   Respiratory:  Negative for shortness of breath.    Cardiovascular:  Negative for leg swelling.   Gastrointestinal:  Negative for blood in stool.   Neurological:  Negative for headaches.     Objective:     Vital Signs (Most Recent):  Temp: 97.4 °F (36.3 °C) (02/21/24 1151)  Pulse: 82 (02/21/24 1151)  Resp: 18 (02/21/24 1151)  BP: 127/73 (02/21/24 1151)  SpO2: 100 % (02/21/24 1151) Vital Signs (24h Range):  Temp:  [96.8 °F (36 °C)-98.1 °F (36.7 °C)] 97.4 °F (36.3 °C)  Pulse:  [72-89] 82  Resp:  [18-20] 18  SpO2:  [99 %-100 %] 100 %  BP: (117-170)/() 127/73     Weight: 67.6 kg (149 lb 0.5 oz)  Body mass index is 24.8 kg/m².  No intake or output data in the 24 hours ending 02/21/24 1412      Physical Exam  Vitals and nursing note reviewed.   Constitutional:       General: She is not in acute distress.     Appearance: She is ill-appearing.   HENT:      Head: Normocephalic and atraumatic.      Nose: Nose normal.      Comments: NC in place     Mouth/Throat:      Mouth: Mucous membranes are moist.      Pharynx: Oropharynx is clear.   Eyes:      Extraocular Movements: Extraocular movements intact.      Conjunctiva/sclera: Conjunctivae normal.      Pupils: Pupils are equal, round, and reactive to light.   Cardiovascular:      Rate and Rhythm: Normal rate and regular rhythm.      Pulses: Normal pulses.      Heart sounds: Normal heart sounds.      Comments: Tunneled cath c/d/i    Pulmonary:      Effort: No respiratory distress.      Breath sounds: Normal breath sounds.      Comments: Mild tachypnea, anterior lung fields clear, mild crackles posterior bases  Abdominal:      General: Abdomen is flat. Bowel sounds are normal. There is no distension.      Palpations: Abdomen is soft.      Tenderness: There is no abdominal tenderness.   Musculoskeletal:         General:  Normal range of motion.      Cervical back: Normal range of motion and neck supple.      Right lower leg: No edema.      Left lower leg: No edema.   Skin:     General: Skin is warm and dry.   Neurological:      General: No focal deficit present.      Mental Status: She is alert and oriented to person, place, and time.   Psychiatric:         Behavior: Behavior normal.             Significant Labs: All pertinent labs within the past 24 hours have been reviewed.    Significant Imaging: I have reviewed all pertinent imaging results/findings within the past 24 hours.    Assessment/Plan:      * Acute hypoxemic respiratory failure  Patient with Hypoxic Respiratory failure which is Acute on chronic.  she is on home oxygen at 2 LPM. Supplemental oxygen was provided and noted-      .   Signs/symptoms of respiratory failure include- tachypnea, increased work of breathing, respiratory distress, and use of accessory muscles. Contributing diagnoses includes - Pleural effusion Labs and images were reviewed. Patient Has recent ABG, which has been reviewed. Will treat underlying causes and adjust management of respiratory failure as follows-     Patient without CHF on Echo (01/12/24) which demonstrated EF 60-65%, no significant valvulopathy, borderline pulm HTN. On torsemide at baseline. Patient reports having some high sodium foods lately. High suspicion for hypervolemia given BNP elevation, though this may be confounded by CKD.      - Improved w/ volume removal with HD  - wean O2 as tolerated, currently on 1L for comfort, no hypoxia noted  - Repeat echocardiogram with new LVEF 15-20% with concerns for hypoperfusion  - Cardiology consulted  - Interventional Cardiology deferring coronary angiography due to respiratory status- now that respiratory status has improved, reconsulted Icards for [facility]  - [facility] 2/21 +/- PCI    Advanced care planning/counseling discussion  - palliative care following- signed off      Goals of care,  counseling/discussion  Advance Care Planning  -palliative care following- signed off         Shortness of breath  - see respiratory failure  - component of anxiety, cont anxiolytics   - IS and acapella      Moderate malnutrition  Nutrition consulted. Most recent weight and BMI monitored-     Measurements:  Wt Readings from Last 1 Encounters:   02/20/24 67.6 kg (149 lb 0.5 oz)   Body mass index is 24.8 kg/m².    Patient has been screened and assessed by RD.    Malnutrition Type:  Context: chronic illness  Level: moderate    Malnutrition Characteristic Summary:  Energy Intake (Malnutrition): less than 75% for greater than or equal to 1 month  Muscle Mass (Malnutrition): mild depletion  Fluid Accumulation (Malnutrition): severe    Interventions/Recommendations (treatment strategy):  1.Continue gluten free diet with texture per SLP 2. modify ONS to Novasource renal TID 3. RD to monitor and follow       Atrial flutter  - cont amio  - currently in NSR  - monitor on tele  - AC contraindicated      Acute decompensated heart failure  Patient is identified as having Systolic (HFrEF) heart failure that is Acute. CHF is currently uncontrolled due to Rales/crackles on pulmonary exam and Pulmonary edema/pleural effusion on CXR. Latest ECHO performed and demonstrates- Results for orders placed during the hospital encounter of 02/12/24    Echo    Interpretation Summary    Left Ventricle: The left ventricle is normal in size. Normal wall thickness. Normal wall motion. There is severely reduced systolic function with a visually estimated ejection fraction of 20 - 25%. There is diastolic dysfunction.    Right Ventricle: Normal right ventricular cavity size. Wall thickness is normal. Right ventricle wall motion has segmental dysfunction with akinesis of the mid to distal free wall. Systolic function is moderately reduced.    Left Atrium: Left atrium is moderately dilated.    Aortic Valve: Aortic valve thickening. There is moderate  "aortic valve sclerosis. There is annular calcification present.    IVC/SVC: Normal venous pressure at 3 mmHg.    Pericardium: Left pleural effusion.  monitor clinical status closely. Monitor on telemetry. Patient is on CHF pathway.  Monitor strict Is&Os and daily weights.  Place on fluid restriction of 1 L. Cardiology has been consulted. Continue to stress to patient importance of self efficacy and  on diet for CHF. Last BNP reviewed- and noted below No results for input(s): "BNP", "BNPTRIAGEBLO" in the last 168 hours.  - will clarify if patient tolerated ARB BB as previously recommended by cardiology,she came out of ICU with those meds held- ICU reports some hypotension so meds held, will monitor BP and add GDMT as tolerated  - Interventional cardiology consulted for C- 2/21 +/- PCI    Secondary hyperparathyroidism  - noted, renal following      Pericardial effusion  - as seen on ECHO: Pericardium: There is a trivial effusion. Left pleural effusion.   - cont HD for volume removal      Chronic kidney disease-mineral and bone disorder  Creatine stable for now. BMP reviewed- noted Estimated Creatinine Clearance: 18 mL/min (A) (based on SCr of 2.5 mg/dL (H)). according to latest data. Based on current GFR, CKD stage is end stage.  Monitor UOP and serial BMP and adjust therapy as needed. Renally dose meds. Avoid nephrotoxic medications and procedures.  - cont sevelamer  - renal following    GIB (gastrointestinal bleeding)  - Recent GIB on last admission with GI recommending 8 week course of Protonix s/p EGD with nonbleeding gastric ulcer. PPI end date 02/23/24 and follow up EGD planned at that time. Continue PPI while inpatient.      Pleural effusion on left  Patient found to have large pleural effusion on imaging. I have personally reviewed and interpreted the following imaging: Xray. A thoracentesis was performed. Pleural fluid was sent for analysis. Fluid most consistent with Transudate.  Most likely " etiology includes Congestive Heart Failure and Renal Failure. Management to include Dialysis  - s/p 7 day course of abx  - pleural fluid cultured NGTD        Atrial fibrillation  Patient with Paroxysmal (<7 days) atrial fibrillation which is controlled currently with Amiodarone. Patient is currently in sinus rhythm.[id number] Score: 3. HASBLED Score: 4. Anticoagulation not indicated due to GIB .  - developed RVR 2/13 started on amio infusion   - transitioned from lopressor to amio in ICU  - cont oral amio  - AC not indicated given high risk of bleeding and recent GIB previous admission     ESRD (end stage renal disease)  Creatine stable for now. BMP reviewed- noted Estimated Creatinine Clearance: 18 mL/min (A) (based on SCr of 2.5 mg/dL (H)). according to latest data. Based on current GFR, CKD stage is end stage.  Monitor UOP and serial BMP and adjust therapy as needed. Renally dose meds. Avoid nephrotoxic medications and procedures.    - renal following for HD needs    Anemia in ESRD (end-stage renal disease)  Patient's anemia is currently controlled. Has not received any PRBCs to date. Etiology likely d/t chronic disease due to Chronic Kidney Disease/ESRD  Current CBC reviewed-   Lab Results   Component Value Date    HGB 7.9 (L) 02/21/2024    HCT 25.7 (L) 02/21/2024     Monitor serial CBC and transfuse if patient becomes hemodynamically unstable, symptomatic or H/H drops below 7/21.    Generalized anxiety disorder  - cont home venlafaxine       Palliative care encounter  - palliative care signed off, f/u in clinic      Hyperlipidemia, mixed  - cont statin      Type 2 diabetes mellitus with diabetic polyneuropathy, without long-term current use of insulin  Patient's FSGs are controlled on current medication regimen.  Last A1c reviewed-   Lab Results   Component Value Date    HGBA1C 4.9 11/15/2023     Most recent fingerstick glucose reviewed-   Recent Labs   Lab 02/20/24  1713 02/20/24  2245 02/21/24  0937    POCTGLUCOSE 214* 220* 189*       Current correctional scale  Low  Maintain anti-hyperglycemic dose as follows-   Antihyperglycemics (From admission, onward)      Start     Stop Route Frequency Ordered    02/15/24 1054  insulin aspart U-100 pen 0-10 Units         -- SubQ Before meals & nightly PRN 02/15/24 1055          Hold Oral hypoglycemics while patient is in the hospital.      VTE Risk Mitigation (From admission, onward)           Ordered     heparin (porcine) injection 5,000 Units  Every 8 hours         02/12/24 0416     IP VTE HIGH RISK PATIENT  Once         02/12/24 0416     Place sequential compression device  Until discontinued         02/12/24 0416     heparin (porcine) injection 1,000 Units  As needed (PRN)         02/12/24 0229                    Discharge Planning   CAMPBELL: 2/23/2024     Code Status: Full Code   Is the patient medically ready for discharge?:     Reason for patient still in hospital (select all that apply): Patient trending condition and Consult recommendations  Discharge Plan A: Skilled Nursing Facility   Discharge Delays: None known at this time              Ewa Meyer MD  Department of Hospital Medicine   [facility]

## 2024-02-21 NOTE — PLAN OF CARE
Problem: Device-Related Complication Risk (Hemodialysis)  Goal: Safe, Effective Therapy Delivery  2/21/2024 0716 by Kay Lozano RN  Outcome: Ongoing, Progressing  2/21/2024 0716 by Kay Lozano RN  Outcome: Ongoing, Progressing     Problem: Hemodynamic Instability (Hemodialysis)  Goal: Effective Tissue Perfusion  2/21/2024 0716 by Kay Lozano RN  Outcome: Ongoing, Progressing  2/21/2024 0716 by Kay Lozano RN  Outcome: Ongoing, Progressing     Problem: Infection (Hemodialysis)  Goal: Absence of Infection Signs and Symptoms  2/21/2024 0716 by Kay Lozano RN  Outcome: Ongoing, Progressing  2/21/2024 0716 by Kay Lozano RN  Outcome: Ongoing, Progressing     Problem: Adult Inpatient Plan of Care  Goal: Plan of Care Review  2/21/2024 0716 by Kay Lozano RN  Outcome: Ongoing, Progressing  2/21/2024 0716 by Kay Lozano RN  Outcome: Ongoing, Progressing  Goal: Patient-Specific Goal (Individualized)  2/21/2024 0716 by Kay Lozano RN  Outcome: Ongoing, Progressing  2/21/2024 0716 by Kay Lozano RN  Outcome: Ongoing, Progressing  Goal: Absence of Hospital-Acquired Illness or Injury  2/21/2024 0716 by Kay Lozano RN  Outcome: Ongoing, Progressing  2/21/2024 0716 by Kay Lozano RN  Outcome: Ongoing, Progressing  Goal: Optimal Comfort and Wellbeing  2/21/2024 0716 by Kay Lozano RN  Outcome: Ongoing, Progressing  2/21/2024 0716 by Kay Lozano RN  Outcome: Ongoing, Progressing  Goal: Readiness for Transition of Care  2/21/2024 0716 by Kay Lozano RN  Outcome: Ongoing, Progressing  2/21/2024 0716 by Kay Lozano RN  Outcome: Ongoing, Progressing     Problem: Device-Related Complication Risk (CRRT (Continuous Renal Replacement Therapy))  Goal: Safe, Effective Therapy Delivery  2/21/2024 0716 by Kay Lozano RN  Outcome: Ongoing, Progressing  2/21/2024 0716 by Bordere, Kay, RN  Outcome: Ongoing, Progressing     Problem: Hypothermia (CRRT  (Continuous Renal Replacement Therapy))  Goal: Body Temperature Maintained in Desired Range  2/21/2024 0716 by Kay Lozano RN  Outcome: Ongoing, Progressing  2/21/2024 0716 by Kay Lozano RN  Outcome: Ongoing, Progressing     Problem: Infection (CRRT (Continuous Renal Replacement Therapy))  Goal: Absence of Infection Signs and Symptoms  2/21/2024 0716 by Kay Lozano RN  Outcome: Ongoing, Progressing  2/21/2024 0716 by Kay Lozano RN  Outcome: Ongoing, Progressing     Problem: Diabetes Comorbidity  Goal: Blood Glucose Level Within Targeted Range  2/21/2024 0716 by Kay Lozano RN  Outcome: Ongoing, Progressing  2/21/2024 0716 by Kay Lozano RN  Outcome: Ongoing, Progressing     Problem: Fluid Imbalance (Pneumonia)  Goal: Fluid Balance  2/21/2024 0716 by Kay Lozano RN  Outcome: Ongoing, Progressing  2/21/2024 0716 by Kay Lozano RN  Outcome: Ongoing, Progressing     Problem: Infection (Pneumonia)  Goal: Resolution of Infection Signs and Symptoms  2/21/2024 0716 by Kay Lozano RN  Outcome: Ongoing, Progressing  2/21/2024 0716 by Kay Lozano RN  Outcome: Ongoing, Progressing     Problem: Respiratory Compromise (Pneumonia)  Goal: Effective Oxygenation and Ventilation  2/21/2024 0716 by Kay Lozano RN  Outcome: Ongoing, Progressing  2/21/2024 0716 by Kay Lozano RN  Outcome: Ongoing, Progressing     Problem: Infection  Goal: Absence of Infection Signs and Symptoms  2/21/2024 0716 by Kay Lozano RN  Outcome: Ongoing, Progressing  2/21/2024 0716 by Kay Lozano RN  Outcome: Ongoing, Progressing     Problem: Impaired Wound Healing  Goal: Optimal Wound Healing  2/21/2024 0716 by Kay Lozano RN  Outcome: Ongoing, Progressing  2/21/2024 0716 by Kay Lozano RN  Outcome: Ongoing, Progressing     Problem: Skin Injury Risk Increased  Goal: Skin Health and Integrity  2/21/2024 0716 by Kay Lozano RN  Outcome: Ongoing, Progressing  2/21/2024 0716 by  Kay Lozano RN  Outcome: Ongoing, Progressing     Problem: Coping Ineffective  Goal: Effective Coping  2/21/2024 0716 by Kay Lozano RN  Outcome: Ongoing, Progressing  2/21/2024 0716 by Kay Lozano, ARABELLA  Outcome: Ongoing, Progressing

## 2024-02-21 NOTE — SUBJECTIVE & OBJECTIVE
Interval History: No concerns. LHC today, HD today  Stable on 1L NC for comfort, no hypoxia noted.   Working with PT/OT.     H/H stable.     Renal following for HD.     Review of Systems   Constitutional:  Positive for fatigue.   Respiratory:  Negative for shortness of breath.    Cardiovascular:  Negative for leg swelling.   Gastrointestinal:  Negative for blood in stool.   Neurological:  Negative for headaches.     Objective:     Vital Signs (Most Recent):  Temp: 97.4 °F (36.3 °C) (02/21/24 1151)  Pulse: 82 (02/21/24 1151)  Resp: 18 (02/21/24 1151)  BP: 127/73 (02/21/24 1151)  SpO2: 100 % (02/21/24 1151) Vital Signs (24h Range):  Temp:  [96.8 °F (36 °C)-98.1 °F (36.7 °C)] 97.4 °F (36.3 °C)  Pulse:  [72-89] 82  Resp:  [18-20] 18  SpO2:  [99 %-100 %] 100 %  BP: (117-170)/() 127/73     Weight: 67.6 kg (149 lb 0.5 oz)  Body mass index is 24.8 kg/m².  No intake or output data in the 24 hours ending 02/21/24 1412      Physical Exam  Vitals and nursing note reviewed.   Constitutional:       General: She is not in acute distress.     Appearance: She is ill-appearing.   HENT:      Head: Normocephalic and atraumatic.      Nose: Nose normal.      Comments: NC in place     Mouth/Throat:      Mouth: Mucous membranes are moist.      Pharynx: Oropharynx is clear.   Eyes:      Extraocular Movements: Extraocular movements intact.      Conjunctiva/sclera: Conjunctivae normal.      Pupils: Pupils are equal, round, and reactive to light.   Cardiovascular:      Rate and Rhythm: Normal rate and regular rhythm.      Pulses: Normal pulses.      Heart sounds: Normal heart sounds.      Comments: Tunneled cath c/d/i    Pulmonary:      Effort: No respiratory distress.      Breath sounds: Normal breath sounds.      Comments: Mild tachypnea, anterior lung fields clear, mild crackles posterior bases  Abdominal:      General: Abdomen is flat. Bowel sounds are normal. There is no distension.      Palpations: Abdomen is soft.      Tenderness:  There is no abdominal tenderness.   Musculoskeletal:         General: Normal range of motion.      Cervical back: Normal range of motion and neck supple.      Right lower leg: No edema.      Left lower leg: No edema.   Skin:     General: Skin is warm and dry.   Neurological:      General: No focal deficit present.      Mental Status: She is alert and oriented to person, place, and time.   Psychiatric:         Behavior: Behavior normal.             Significant Labs: All pertinent labs within the past 24 hours have been reviewed.    Significant Imaging: I have reviewed all pertinent imaging results/findings within the past 24 hours.

## 2024-02-21 NOTE — SUBJECTIVE & OBJECTIVE
Past Medical History:   Diagnosis Date    Anxiety     Celiac disease 2018    Celiac disease     Depression     Diabetes mellitus     Family history of breast cancer in mother      at age 68    Hyperlipidemia     Hypertension     Meniere disease     Meniere's disease, unspecified ear     Menopause     Peptic ulcer     Peritonitis 2023    Reflux esophagitis     Urinary tract infection     Vaginal infection     Vaginal Pap smear 2014    Pap/Hpv Negative        Past Surgical History:   Procedure Laterality Date    APPENDECTOMY      BREAST SURGERY      Tags    CARPAL TUNNEL RELEASE Bilateral 2017    ,     CLOSURE, COLOSTOMY Left 2023    Procedure: CLOSURE, COLOSTOMY;  Surgeon: Manuel Javier MD;  Location: [facility] OR;  Service: General;  Laterality: Left;    COLONOSCOPY  2018    Normal  ( Juan A)     COLOSTOMY Right 2023    Procedure: CREATION, COLOSTOMY;  Surgeon: Manuel Javier MD;  Location: [facility] OR;  Service: General;  Laterality: Right;    DILATION AND CURETTAGE OF UTERUS  1986    DUPUYTREN CONTRACTURE RELEASE Bilateral 2017    ESOPHAGOGASTRODUODENOSCOPY N/A 2024    Procedure: EGD (ESOPHAGOGASTRODUODENOSCOPY);  Surgeon: Yamilet Walters MD;  [facility]  Service: Gastroenterology;  Laterality: N/A;    ESOPHAGOGASTRODUODENOSCOPY N/A 2024    Procedure: EGD (ESOPHAGOGASTRODUODENOSCOPY);  Surgeon: Yamilet Walters MD;  [address] [facility] [address]O [address]  Service: Gastroenterology;  Laterality: N/A;    HYSTERECTOMY      BEAU w/ appy, no BSO     IMPLANTATION OF LEADLESS PACEMAKER N/A 2024    Procedure: INSERTION, CARDIAC PACEMAKER, LEADLESS;  Surgeon: LENIN Frances MD;  Location: [facility] EP LAB;  Service: Cardiology;  Laterality: N/A;  SB, LEADLESS PPM (MICRA), MDT, ANES, EH, CVICU [id number]    INJECTION OF NEUROLYTIC AGENT AROUND LUMBAR SYMPATHETIC NERVE N/A 2022    Procedure: BLOCK, LUMBAR SYMPATHETIC;  Surgeon: Souleymane Rizo  MD Abby;  Location: [facility] PAIN MGT;  Service: Pain Management;  Laterality: N/A;  no pacemaker   pt is diabetic     INJECTION OF NEUROLYTIC AGENT AROUND LUMBAR SYMPATHETIC NERVE N/A 8/25/2022    Procedure: BLOCK, LUMBAR SYMPATHETIC;  Surgeon: Souleymane Rizo Jr., MD;  Location: [facility] PAIN MGT;  Service: Pain Management;  Laterality: N/A;  diabetic     INSERTION OF TUNNELED CENTRAL VENOUS HEMODIALYSIS CATHETER Right 1/25/2023    Procedure: INSERTION, CATHETER, HEMODIALYSIS, DUAL LUMEN;  Surgeon: Manuel Javier MD;  Location: [facility] OR;  Service: General;  Laterality: Right;    INSERTION, CATHETER, TUNNELED Left 1/28/2023    Procedure: Insertion,catheter,tunneled;  Surgeon: Watson Fontenot MD;  Location: [facility] OR;  Service: General;  Laterality: Left;    LAPAROTOMY, EXPLORATORY N/A 1/14/2023    Procedure: LAPAROTOMY, EXPLORATORY;  Surgeon: Manuel Javier MD;  Location: [facility] OR;  Service: General;  Laterality: N/A;    LAPAROTOMY, EXPLORATORY N/A 1/16/2023    Procedure: LAPAROTOMY, EXPLORATORY;  Surgeon: Manuel Javier MD;  Location: [facility] OR;  Service: General;  Laterality: N/A;    LYMPHADENECTOMY Right 1/2/2024    Procedure: LYMPHADENECTOMY;  Surgeon: Tan Thomson MD;  Location: [address];  Service: Cardiothoracic;  Laterality: Right;    PACEMAKER, TEMPORARY, TRANSVENOUS, PEDIATRIC Right 1/12/2024    Procedure: Pacemaker, Temporary, Transvenous;  Surgeon: Todd Carlisle MD;  Location: [facility] CATH LAB;  Service: Cardiology;  Laterality: Right;    REMOVAL OF CATHETER Right 1/28/2023    Procedure: REMOVAL, CATHETER;  Surgeon: Watson Fontenot MD;  Location: [facility] OR;  Service: General;  Laterality: Right;    REMOVAL, TUNNELED CATH Right 1/25/2023    Procedure: REMOVAL, TUNNELED CATH;  Surgeon: Manuel Javier MD;  Location: [facility] OR;  Service: General;  Laterality: Right;    ROBOTIC BRONCHOSCOPY N/A 10/20/2023    Procedure: ROBOTIC BRONCHOSCOPY;  Surgeon: Mayda Monzon MD;  [facility];  Service: Pulmonary;  Laterality: N/A;    SHOULDER SURGERY   &     right rotator cuff    THORACOTOMY Right 2024    Procedure: THORACOTOMY;  Surgeon: Tan Thomson MD;  Location: [facility] OR [address] [place] [address];  Service: Cardiothoracic;  Laterality: Right;    THORACOTOMY, WITH INITIAL THERAPEUTIC WEDGE RESECTION OF LUNG Right 2024    Procedure: THORACOTOMY, WITH INITIAL THERAPEUTIC WEDGE RESECTION OF LUNG;  Surgeon: Tan Thomson MD;  Location: [facility] OR [address] [place] [address];  Service: Cardiothoracic;  Laterality: Right;    TONSILLECTOMY      UPPER GASTROINTESTINAL ENDOSCOPY  2018       Review of patient's allergies indicates:   Allergen Reactions    Crestor [rosuvastatin] Swelling    Gluten protein        Facility-Administered Medications Prior to Admission   Medication    [DISCONTINUED] capsaicin-skin cleanser patch 1 patch    [DISCONTINUED] capsaicin-skin cleanser patch 2 patch     PTA Medications   Medication Sig    atorvastatin (LIPITOR) 40 MG tablet Take 1 tablet (40 mg total) by mouth once daily. (Patient taking differently: Take 40 mg by mouth every evening.)    B complex-vitamin C-folic acid (MELLISSA-BENJY) 0.8 mg Tab Take 1 tablet (0.8 mg total) by mouth once daily.    [] cefpodoxime (VANTIN) 200 MG tablet Take 1 tablet (200 mg total) by mouth every evening. for 13 days    insulin aspart U-100 (NOVOLOG) 100 unit/mL (3 mL) InPn pen Inject before meals:  150-200=+1, 201-250=+2, 251-300=+3; 301-350=+4, over 350=+5 units    meclizine (ANTIVERT) 25 mg tablet Take 1 tablet (25 mg total) by mouth 3 (three) times daily as needed for Dizziness.    metoprolol tartrate (LOPRESSOR) 25 MG tablet Take 0.5 tablets (12.5 mg total) by mouth 2 (two) times daily.    [] metroNIDAZOLE (FLAGYL) 500 MG tablet Take 1 tablet (500 mg total) by mouth every 12 (twelve) hours. for 13 days    midodrine (PROAMATINE) 10 MG tablet Take 1 tablet (10 mg total) by mouth 2 (two) times daily with meals.    pantoprazole  "(PROTONIX) 40 MG tablet Take 1 tablet (40 mg total) by mouth 2 (two) times daily before meals.    patiromer calcium sorbitex (VELTASSA) 8.4 gram PwPk Take 2 packets (16.8 g total) by mouth once daily.    pregabalin (LYRICA) 75 MG capsule Take 1 capsule (75 mg total) by mouth Daily. Give after HD (Patient taking differently: Take 75 mg by mouth every other day. Give after HD)    sevelamer HCL (RENAGEL) 800 MG Tab Take 2 tablets (1,600 mg total) by mouth 3 (three) times daily with meals.    torsemide (DEMADEX) 20 MG Tab Take 20 mg by mouth 2 (two) times daily.    venlafaxine (EFFEXOR) 37.5 MG Tab Take 1 tablet (37.5 mg total) by mouth once daily.    betahistine HCl (BETAHISTINE, BULK, MISC)     blood sugar diagnostic (TRUE METRIX GLUCOSE TEST STRIP) Strp USE ONE STRIP FOR TESTING 2 TIMES A DAY    blood-glucose meter kit Use to test twice a day    calcium-vitamin D3 (OS-CIPRIANO 500 + D3) 500 mg-5 mcg (200 unit) per tablet Take 1 tablet by mouth once daily.    ferrous gluconate (FERGON) 324 MG tablet Take 1 tablet (324 mg total) by mouth daily with breakfast.    lancets Misc 1 lancet by Misc.(Non-Drug; Combo Route) route 4 (four) times daily.    nutritional supplements Liqd Take 237 mLs by mouth 4 (four) times daily.    pen needle, diabetic (BD ULTRA-FINE MARIS PEN NEEDLE) 32 gauge x 5/32" Ndle 1 Device by Misc.(Non-Drug; Combo Route) route 4 (four) times daily with meals and nightly.    vit C,R-Mt-udorj-lutein-zeaxan (PRESERVISION AREDS 2) 117-078-73-1 mg-unit-mg-mg Cap      Family History       Problem Relation (Age of Onset)    Arthritis Father    Breast cancer Mother, Paternal Aunt    Cancer Mother, Paternal Aunt    Diabetes Paternal Grandfather    Hyperlipidemia Sister    Vision loss Father, Mother, Sister          Tobacco Use    Smoking status: Never    Smokeless tobacco: Never   Substance and Sexual Activity    Alcohol use: No    Drug use: Never    Sexual activity: Not Currently     Partners: Male     Birth " control/protection: See Surgical Hx     Comment: :      Review of Systems   Constitutional: Negative for chills, fever and night sweats.   HENT:  Negative for congestion and hearing loss.    Eyes:  Negative for blurred vision, discharge, pain and visual disturbance.   Cardiovascular:  Negative for chest pain, dyspnea on exertion, leg swelling, near-syncope and palpitations.   Respiratory:  Negative for cough, hemoptysis, shortness of breath and wheezing.    Endocrine: Negative for cold intolerance and heat intolerance.   Skin:  Negative for flushing.   Musculoskeletal:  Positive for back pain. Negative for muscle weakness, myalgias and neck pain.   Gastrointestinal:  Negative for abdominal pain, constipation, diarrhea, nausea and vomiting.   Genitourinary:  Negative for dysuria and hematuria.   Neurological:  Negative for focal weakness, headaches, numbness and paresthesias.   Psychiatric/Behavioral:  Negative for altered mental status and memory loss. The patient is not nervous/anxious.      Objective:     Vital Signs (Most Recent):  Temp: 97.7 °F (36.5 °C) (02/21/24 0751)  Pulse: 79 (02/21/24 0751)  Resp: 18 (02/21/24 0751)  BP: 117/70 (02/21/24 0751)  SpO2: 100 % (02/21/24 0751) Vital Signs (24h Range):  Temp:  [96.8 °F (36 °C)-98.1 °F (36.7 °C)] 97.7 °F (36.5 °C)  Pulse:  [72-89] 79  Resp:  [18-20] 18  SpO2:  [99 %-100 %] 100 %  BP: (117-170)/() 117/70     Weight: 67.6 kg (149 lb 0.5 oz)  Body mass index is 24.8 kg/m².    SpO2: 100 %       No intake or output data in the 24 hours ending 02/21/24 0854    Lines/Drains/Airways       Central Venous Catheter Line  Duration             Permacath right subclavian -- days         Hemodialysis Catheter 01/25/23 1501 right internal jugular 391 days              Peripheral Intravenous Line  Duration                  Hemodialysis AV Fistula 09/01/23 0800 Left forearm 173 days         Peripheral IV - Single Lumen 02/16/24 2215 18 G Anterior;Distal;Left Forearm 4  days                     Physical Exam  Constitutional:       General: She is not in acute distress.     Appearance: Normal appearance. She is not ill-appearing or diaphoretic.   HENT:      Head: Normocephalic and atraumatic.   Eyes:      General: No scleral icterus.        Right eye: No discharge.         Left eye: No discharge.      Extraocular Movements: Extraocular movements intact.      Conjunctiva/sclera: Conjunctivae normal.   Cardiovascular:      Rate and Rhythm: Normal rate and regular rhythm.      Pulses: Normal pulses.      Comments: 1+ radial bilateral  2+ femoral bilateral  2+ pedal bilateral  Pulmonary:      Effort: Pulmonary effort is normal. No respiratory distress.   Musculoskeletal:         General: Normal range of motion.   Skin:     General: Skin is warm and dry.   Neurological:      General: No focal deficit present.      Mental Status: She is alert and oriented to person, place, and time.   Psychiatric:         Mood and Affect: Mood normal.         Behavior: Behavior normal.            Significant Labs: All pertinent lab results from the last 24 hours have been reviewed.

## 2024-02-21 NOTE — ASSESSMENT & PLAN NOTE
Patient with Hypoxic Respiratory failure which is Acute on chronic.  she is on home oxygen at 2 LPM. Supplemental oxygen was provided and noted-      .   Signs/symptoms of respiratory failure include- tachypnea, increased work of breathing, respiratory distress, and use of accessory muscles. Contributing diagnoses includes - Pleural effusion Labs and images were reviewed. Patient Has recent ABG, which has been reviewed. Will treat underlying causes and adjust management of respiratory failure as follows-     Patient without CHF on Echo (01/12/24) which demonstrated EF 60-65%, no significant valvulopathy, borderline pulm HTN. On torsemide at baseline. Patient reports having some high sodium foods lately. High suspicion for hypervolemia given BNP elevation, though this may be confounded by CKD.      - Improved w/ volume removal with HD  - wean O2 as tolerated, currently on 1L for comfort, no hypoxia noted  - Repeat echocardiogram with new LVEF 15-20% with concerns for hypoperfusion  - Cardiology consulted  - Interventional Cardiology deferring coronary angiography due to respiratory status- now that respiratory status has improved, reconsulted Icards for [facility]

## 2024-02-21 NOTE — HOSPITAL COURSE
2/12:  Patient admitted for hypoxic respiratory failure.  Echocardiogram revealed new low LVEF with elevated troponins.  Cardiology consulted.  2/13:  Patient developed AFib with RVR this morning--started amiodarone infusion.  Restarted on SLED overnight.  CT findings with a couple fluid collections in the right paraesophageal space and azygoesophageal recess.  2/14:  CT Surgery consulted and recommended IR consultation for evaluation for drainage; unfortunately, no safe window for approach.  Interventional Cardiology deferring angiography due to respiratory status.  Palliative Care consulted.  10 hours SLED overnight with removal of 2.1 L. A couple episodes of RVR overnight--increasing metoprolol this morning.  2/15:  Clotted off on CRRT overnight and restarted.  Thoracentesis performed with removal of 1.45L--sent for analysis.  Developed re-expansion pulmonary edema and started on Airvo.  2/16:  Airvo weaned down to 50L40% this morning.  Transudative effusion on analysis, remaining studies pending.  Started on vasopressor support overnight to support volume removal with CRRT.  2/17:  Weaned off Airvo overnight, now on HFNC at 12 L/min.  On SLED overnight with a couple episodes of clotting off.  Tolerated SLED off norepinephrine.    2/18: Weaned to 4L this am. Feels weak and tired today  2/19: No significant changes  2/20: Stepped down to HM. Stable on 1L. Nephrology following for HD. Working with PT/OT.    Interventional cardiology consulted for [facility].    Patient feels better. She is able to lay flat without dyspnea but does have MSK pain.

## 2024-02-21 NOTE — ASSESSMENT & PLAN NOTE
Patient with Hypoxic Respiratory failure which is Acute on chronic.  she is on home oxygen at 2 LPM. Supplemental oxygen was provided and noted-      .   Signs/symptoms of respiratory failure include- tachypnea, increased work of breathing, respiratory distress, and use of accessory muscles. Contributing diagnoses includes - Pleural effusion Labs and images were reviewed. Patient Has recent ABG, which has been reviewed. Will treat underlying causes and adjust management of respiratory failure as follows-     Patient without CHF on Echo (01/12/24) which demonstrated EF 60-65%, no significant valvulopathy, borderline pulm HTN. On torsemide at baseline. Patient reports having some high sodium foods lately. High suspicion for hypervolemia given BNP elevation, though this may be confounded by CKD.      - Improved w/ volume removal with HD  - wean O2 as tolerated, currently on 1L for comfort, no hypoxia noted  - Repeat echocardiogram with new LVEF 15-20% with concerns for hypoperfusion  - Cardiology consulted  - Interventional Cardiology deferring coronary angiography due to respiratory status- now that respiratory status has improved, reconsulted Icards for [facility]  - [facility] 2/21 +/- PCI

## 2024-02-21 NOTE — ASSESSMENT & PLAN NOTE
Palliative medicine consulted for goals of care care, advanced care planning with focus on resuscitation status as discussed with primary team NP for Mrs. Green a 72 yo lady admitted to critical care with hypoxic respiratory failure with increased work of breathing.  At time of this encounter Mrs. Green is awake and more alert, does not participate in conversation.  CRRT in iuse , no behavioral signs of pain.   Supplemental oxygen in use     Advance Care Planning     Date: 02/16/2024  - No ACP documents received   - ACP education provided   - Patient did not wish or was not able to name a surrogate decision maker or provide an Advance Care Plan.  -  Deric Green is next of kin for medical decisions [phone]   -  continues to discuss resuscitation status with family   - full code per primary team.      Advance Care Planning     Date: 02/16/2024    Goals of care   - Pal med MAIK and MARILYN Alves, GOODW returned to bedside.   at bedside.   -  , Deric,  and sister Sweetie. Deric reports having a lengthy conversation with his in- laws.   -  states the patient would not choose heroic measures and states he and the family are amenable to cpr but not to pounding on the chest and are amenable to gentle shocks with defibrillator  - Palliative medicine APRN explained CPR is a  heroic measures that requires chest compressions at a depth deep enough to compress the heart and send blood to the body especially her vital organs.  Further explained defibrilation only occurs when there is a shockable rhythm.    - Discussed Mrs. Green's survivability to CPR and expressed concern that Mrs. Green would not survive CPR as she she has multiple organ failure - lungs, kidney and heart.  - DNR order recommended and explained what this means for Mrs. Green - a peaceful and natural death with all care continued.  -  and sister are not amenable to DNR.  Both state if her heart stops beating she  should have every chance of recovery including cpr and mechanical ventilation   -  further clarifies She, Mrs. Green would not be amenable to long term life support.   -  defines this as not longer than 2 weeks. At that time if the patient has no reasonable chance of recovery he would be amenable to comfort care.    -  The family endorses that what is most important right now is to focus on extending life as long as possible, even it it means sacrificing quality and curative/life-prolongation (regardless of treatment burdens)        Symptom Management     Pain   - no c/o pain  - no pain meds given     Dyspnea   - shortness of breath on exertion noted  - supplemental oxygen in use   - oxygen sat 98 - 100%  - patient has fan at bedside     Hyponatremia Acute hypoxic respiratory failure/ESRD on CKD/  new diagnosis CHF/ afib with RVR  - as per primary team and specialty consults   - Na level remains low despite aggressive fluid removal   - supplemental oxygen in use - see dyspnea above   - bilateral pleural effusion antibiotics continued   - CRRT in progress  - amiodarone drip   - anticipating step down to hospital medicine   - pal med following for goals of care     Plan  - continue current plan of care  - patient and family would benefit from discussions regarding prognosis in order to develop goals of care   - pal med will continue to follow and provide additional guidance for advanced care planning and completion of acp documents    Primary team, Dr. Blas,  notified of the above.

## 2024-02-21 NOTE — PT/OT/SLP PROGRESS
"Physical Therapy Treatment    Patient Name:  Lily Green   MRN:  [id number]    Recommendations:     Discharge Recommendations: Moderate Intensity Therapy  Discharge Equipment Recommendations: wheelchair  Barriers to discharge:  patient requiring significant assistance    Assessment:     Lily Green is a 73 y.o. female admitted with a medical diagnosis of Acute hypoxemic respiratory failure.  She presents with the following impairments/functional limitations: weakness, impaired endurance, impaired functional mobility, gait instability, impaired balance, decreased lower extremity function, decreased upper extremity function, impaired skin, edema, impaired cardiopulmonary response to activity Patient agreeable to therapy this date with encouragement from supportive  throughout session. Patient completed therapeutic exercises while seated up in chair and 2 partial stands for improved positioning. Tolerated session with increased fatigue and pursed lip breathing implemented throughout. Will continue to benefit from skilled PT during this unit to promote functional independence with mobility.    Rehab Prognosis: Good; patient would benefit from acute skilled PT services to address these deficits and reach maximum level of function.    Recent Surgery: Procedure(s) (LRB):  ANGIOGRAM, CORONARY ARTERY (N/A) Day of Surgery    Plan:     During this hospitalization, patient to be seen 3 x/week to address the identified rehab impairments via gait training, therapeutic activities, therapeutic exercises, neuromuscular re-education and progress toward the following goals:    Plan of Care Expires:  03/16/24    Subjective     Chief Complaint: "I exercise my legs sometimes"  Patient/Family Comments/goals: gain strength  Pain/Comfort:  Pain Rating 1: 0/10  Pain Rating Post-Intervention 1: 0/10      Objective:     Communicated with RN prior to session.  Patient found up in chair with telemetry, oxygen upon " PT entry to room.     General Precautions: Standard, fall  Orthopedic Precautions: N/A  Braces: N/A  Respiratory Status: Nasal cannula, flow 5 L/min     Functional Mobility:  Transfers:     Sit to Stand:  max assistance with no AD and from chair height x2 trials, partial stands for appropriate pillow placement in chair, patient does not fully extend knees,   Balance: Standing: required max A to remain standing       AM-PAC 6 CLICK MOBILITY  Turning over in bed (including adjusting bedclothes, sheets and blankets)?: 3  Sitting down on and standing up from a chair with arms (e.g., wheelchair, bedside commode, etc.): 2  Moving from lying on back to sitting on the side of the bed?: 2  Moving to and from a bed to a chair (including a wheelchair)?: 2  Need to walk in hospital room?: 2  Climbing 3-5 steps with a railing?: 1  Basic Mobility Total Score: 12       Treatment & Education:  Patient and  educated on importance of participation with therapy, pursed lip breathing and OOB mobility  Patient and  educated to call for assistance as needed for fall prevention  X15 ankle pumps, LAQ, hip flex, hib abduction    Patient left up in chair with all lines intact, call button in reach, and PCT and  present..    GOALS:   Multidisciplinary Problems       Physical Therapy Goals          Problem: Physical Therapy    Goal Priority Disciplines Outcome Goal Variances Interventions   Physical Therapy Goal     PT, PT/OT Ongoing, Progressing     Description: Goals to be met by: 3/16/24     Patient will increase functional independence with mobility by performin. Supine to sit with Contact Guard Assistance  2. Sit to supine with Contact Guard Assistance  3. Sit to stand transfer with Contact Guard Assistance  4. Bed to chair transfer with Contact Guard Assistance using Rolling Walker  5. Gait  x 50 feet with Contact Guard Assistance using Rolling Walker.                          Time Tracking:     PT Received  On: 02/21/24  PT Start Time: 1119     PT Stop Time: 1147  PT Total Time (min): 28 min     Billable Minutes: Therapeutic Activity 10 and Therapeutic Exercise 18    Treatment Type: Treatment  PT/PTA: PT     Number of PTA visits since last PT visit: 0     02/21/2024

## 2024-02-21 NOTE — ASSESSMENT & PLAN NOTE
Palliative medicine consulted for goals of care care, advanced care planning with focus on resuscitation status as discussed with primary team NP for Mrs. Green a 74 yo lady admitted to critical care with hypoxic respiratory failure with increased work of breathing.  At time of this encounter Mrs. Green is awake and more alert, sitting in chair and answers simple questions today. no behavioral signs of pain.   Supplemental oxygen in use     Advance Care Planning     Date: 02/16/2024  - No ACP documents received   - ACP education provided   - Patient did not wish or was not able to name a surrogate decision maker or provide an Advance Care Plan.  -   Deric Green is next of kin for medical decisions [phone]   - full code per primary team. Family is not amenable to any change in resuscitation status       Advance Care Planning     Date: 02/16/2024    Goals of care   - Pal med APRSAMUEL and MARILYN Alves LCSW returned to bedside.   at bedside.   -    The family endorses that what is most important right now is to focus on extending life as long as possible, even it it means sacrificing quality and curative/life-prolongation (regardless of treatment burdens)  - Family is hopeful Mrs Green will continue to improve and meet criteria to resume skilled nursing care.    - amenable to follow up in outpatient [facility] clinic - pal med will facilitate referral         Symptom Management     Pain   - no c/o pain  - no pain meds given     Dyspnea   - shortness of breath on exertion noted  - supplemental oxygen in use   - oxygen sat 98 - 100%  - patient has fan at bedside     Hyponatremia Acute hypoxic respiratory failure/ESRD on CKD/  new diagnosis CHF/ afib with RVR  - as per primary team and specialty consults   - Na level remains  132   - supplemental oxygen in use - see dyspnea above   - bilateral pleural effusion antibiotics continued   - no need for RRT today, nephrology following   - amiodarone changed to  oral   - step down to hospital medicine   - pal med following for goals of care     Plan  - continue current plan of care  - patient and family would benefit from discussions regarding prognosis in order to develop goals of care   - pal med will continue to follow and provide additional guidance for advanced care planning and completion of acp documents    Primary team, Dr. Meyer aware of the above via secure chat message

## 2024-02-21 NOTE — ASSESSMENT & PLAN NOTE
Acute new onset systolic HF 15-20% with mod-severe MR  Patient will need LHC to evaluate for ischemic cardiomyopathy.  -LHC +/- PCI, patient is a ANTONIO candidate  - Anti-platelet Therapy: ASA  - Access: Right radial  - Creatinine/CrCl: dialysis  - Allergies: no Iodine allergy  - Pre-Hydration: none (on dialysis)  - Pre-Op Med: Bendaryl 50mg pO   - All patient's questions were answered.  -The risks, benefits and alternatives of the procedure were explained to the patient.   -The risks of coronary angiography include but are not limited to: bleeding, infection, heart rhythm abnormalities, allergic reactions, kidney injury and potential need for dialysis, stroke and death.   - Should stenting be indicated, the patient has agreed to dual anti-platelet therapy for 1-consecutive year with a drug-eluting stent and a minimum of 1-month with the use of a bare metal stent  - Additionally, pt is aware that non-compliance is likely to result in stent clotting with heart attack, heart failure, and/or death  -The risks of moderate sedation include hypotension, respiratory depression, arrhythmias, bronchospasm, and death.   - Informed consent was obtained and the  patient is agreeable to proceed with the procedure.

## 2024-02-21 NOTE — ASSESSMENT & PLAN NOTE
Goal hemoglobin in ESRD is 10-12  Hemoglobin   Date Value Ref Range Status   02/21/2024 7.9 (L) 12.0 - 16.0 g/dL Final     Iron   Date Value Ref Range Status   02/02/2024 52 30 - 160 ug/dL Final     Transferrin   Date Value Ref Range Status   02/02/2024 121 (L) 200 - 375 mg/dL Final     TIBC   Date Value Ref Range Status   02/02/2024 179 (L) 250 - 450 ug/dL Final     Saturated Iron   Date Value Ref Range Status   02/02/2024 29 20 - 50 % Final     Ferritin   Date Value Ref Range Status   02/02/2024 1,803 (H) 20.0 - 300.0 ng/mL Final     -Epo with HD

## 2024-02-21 NOTE — PLAN OF CARE
Problem: Adult Inpatient Plan of Care  Goal: Absence of Hospital-Acquired Illness or Injury  Outcome: Ongoing, Progressing     Problem: Adult Inpatient Plan of Care  Goal: Patient-Specific Goal (Individualized)  Outcome: Ongoing, Progressing     Problem: Adult Inpatient Plan of Care  Goal: Plan of Care Review  Outcome: Ongoing, Progressing

## 2024-02-21 NOTE — ASSESSMENT & PLAN NOTE
Creatine stable for now. BMP reviewed- noted Estimated Creatinine Clearance: 34.7 mL/min (based on SCr of 1.3 mg/dL). according to latest data. Based on current GFR, CKD stage is end stage.  Monitor UOP and serial BMP and adjust therapy as needed. Renally dose meds. Avoid nephrotoxic medications and procedures.  - cont sevelamer  - renal following

## 2024-02-21 NOTE — ASSESSMENT & PLAN NOTE
- as seen on ECHO: Pericardium: There is a trivial effusion. Left pleural effusion.   - cont HD for volume removal

## 2024-02-21 NOTE — ASSESSMENT & PLAN NOTE
Patient found to have large pleural effusion on imaging. I have personally reviewed and interpreted the following imaging: Xray. A thoracentesis was performed. Pleural fluid was sent for analysis. Fluid most consistent with Transudate.  Most likely etiology includes Congestive Heart Failure and Renal Failure. Management to include Dialysis  - s/p 7 day course of abx  - pleural fluid cultured NGTD

## 2024-02-21 NOTE — BRIEF OP NOTE
Brief Operative Note:    : Todd Carlisle MD     Referring Physician: Self,Aaareferral     All Operators: Surgeon(s):  Todd Carlisle MD Hallak, MD Kari Noguera, MD Rachel     Preoperative Diagnosis: Cardiomyopathy  HFrEF     Postop Diagnosis: Non-ischemic cardiomyopathy    Treatments/Procedures: Procedure(s) (LRB):  ANGIOGRAM, CORONARY ARTERY (N/A)    Findings: Non-obstructive CAD is present. See catheterization report for full details.    Estimated Blood loss: 20 cc    Specimens removed: No    Recommendations:   - Routine post-cath care as per orders  - start aspirin 81mg daily and atorvastatin 10mg daily  - Consult HTS for management of NICM  Continue medical management, Risk factor reduction, Follow-up with outpatient cardiologist      Darcy Ogden

## 2024-02-21 NOTE — SUBJECTIVE & OBJECTIVE
Interval History: Stepped down from ICU yesterday. HD at bedside today.     Review of patient's allergies indicates:   Allergen Reactions    Crestor [rosuvastatin] Swelling    Gluten protein      Current Facility-Administered Medications   Medication Frequency    0.9%  NaCl infusion Once    acetaminophen tablet 650 mg Q4H PRN    albuterol-ipratropium 2.5 mg-0.5 mg/3 mL nebulizer solution 3 mL Q4H PRN    amiodarone tablet 400 mg BID    Followed by    [START ON 2/25/2024] amiodarone tablet 200 mg Daily    aspirin tablet 325 mg Once    atorvastatin tablet 40 mg QHS    dextrose 10% bolus 125 mL 125 mL PRN    dextrose 10% bolus 250 mL 250 mL PRN    droNABinol capsule 5 mg BID AC    epoetin noble injection 3,400 Units Every Mon, Wed, Fri    ferrous gluconate tablet 324 mg Daily with breakfast    glucagon (human recombinant) injection 1 mg PRN    glucose chewable tablet 16 g PRN    glucose chewable tablet 24 g PRN    heparin (porcine) injection 5,000 Units Q8H    insulin aspart U-100 pen 0-10 Units QID (AC + HS) PRN    melatonin tablet 6 mg Nightly PRN    NIFEdipine 24 hr tablet 30 mg Daily    ondansetron injection 4 mg Q6H PRN    pantoprazole EC tablet 40 mg BID AC    pregabalin capsule 75 mg Every other day    sodium chloride 0.9% flush 10 mL PRN    sodium chloride 0.9% flush 10 mL PRN    venlafaxine tablet 37.5 mg Daily       Objective:     Vital Signs (Most Recent):  Temp: 97.4 °F (36.3 °C) (02/21/24 1151)  Pulse: 82 (02/21/24 1151)  Resp: 18 (02/21/24 1151)  BP: 127/73 (02/21/24 1151)  SpO2: 100 % (02/21/24 1151) Vital Signs (24h Range):  Temp:  [96.8 °F (36 °C)-98.1 °F (36.7 °C)] 97.4 °F (36.3 °C)  Pulse:  [72-89] 82  Resp:  [18-20] 18  SpO2:  [99 %-100 %] 100 %  BP: (117-170)/() 127/73     Weight: 67.6 kg (149 lb 0.5 oz) (02/20/24 1018)  Body mass index is 24.8 kg/m².  Body surface area is 1.76 meters squared.    No intake/output data recorded.     Physical Exam  Vitals and nursing note reviewed.    Constitutional:       General: She is not in acute distress.     Appearance: She is normal weight. She is not ill-appearing, toxic-appearing or diaphoretic.      Comments: Appears frail   HENT:      Head: Normocephalic and atraumatic.   Cardiovascular:      Rate and Rhythm: Rhythm irregular.      Pulses: Normal pulses.      Heart sounds: Normal heart sounds. No murmur heard.     No friction rub. No gallop.   Pulmonary:      Comments: Moderately increased respiratory effort with bilateral crackles  Abdominal:      General: Abdomen is flat. Bowel sounds are normal. There is no distension.      Palpations: Abdomen is soft.      Tenderness: There is no abdominal tenderness. There is no guarding or rebound.   Musculoskeletal:         General: No swelling or tenderness.   Skin:     General: Skin is warm and dry.      Coloration: Skin is not jaundiced or pale.          Significant Labs:  CBC:   Recent Labs   Lab 02/21/24  0746   WBC 9.00   RBC 2.61*   HGB 7.9*   HCT 25.7*      MCV 99*   MCH 30.3   MCHC 30.7*     CMP:   Recent Labs   Lab 02/21/24  0746   *   CALCIUM 8.4*   ALBUMIN 1.9*   PROT 5.8*   *   K 4.3   CO2 23      BUN 35*   CREATININE 2.5*   ALKPHOS 89   ALT 33   AST 17   BILITOT 0.3     All labs within the past 24 hours have been reviewed.

## 2024-02-21 NOTE — ASSESSMENT & PLAN NOTE
Patient with Paroxysmal (<7 days) atrial fibrillation which is controlled currently with Amiodarone. Patient is currently in sinus rhythm.[id number] Score: 3. HASBLED Score: 4. Anticoagulation not indicated due to GIB .  - developed RVR 2/13 started on amio infusion   - transitioned from lopressor to amio in ICU  - cont oral amio  - AC not indicated given high risk of bleeding and recent GIB previous admission

## 2024-02-21 NOTE — ASSESSMENT & PLAN NOTE
Creatine stable for now. BMP reviewed- noted Estimated Creatinine Clearance: 34.7 mL/min (based on SCr of 1.3 mg/dL). according to latest data. Based on current GFR, CKD stage is end stage.  Monitor UOP and serial BMP and adjust therapy as needed. Renally dose meds. Avoid nephrotoxic medications and procedures.    - renal following for HD needs

## 2024-02-21 NOTE — CONSULTS
[facility] - Telemetry Stepdown  Interventional Cardiology  Consult Note    Patient Name: Lily Green  MRN: [id number]  Admission Date: 2/12/2024  Hospital Length of Stay: 9 days  Code Status: Full Code   Attending Provider: Ewa Meyer MD  Consulting Provider: Darcy Ogden MD  Primary Care Physician: Pavel Calixto MD  Principal Problem:Acute hypoxemic respiratory failure    Patient information was obtained from patient, caregiver / friend, past medical records, and primary team.     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 Hypoxia resolved with dialysis but she remains tachypneic and developed afib w/RVR. Echo revealed newly reduced EF 15-20% with mod-severe MR. Initially there was concern for shock but lactate < 2 and CI > 2. IC consulted for evaluation of newly reduced EF. Of note, CTA shows loculated effusions.    Hospital course:  2/12:  Patient admitted for hypoxic respiratory failure.  Echocardiogram revealed new low LVEF with elevated troponins.  Cardiology consulted.  2/13:  Patient developed AFib with RVR this morning--started amiodarone infusion.  Restarted on SLED overnight.  CT findings with a couple fluid collections in the right paraesophageal space and azygoesophageal recess.  2/14:  CT Surgery consulted and recommended IR consultation for evaluation for drainage; unfortunately, no safe window for approach.  Interventional Cardiology deferring angiography due to respiratory status.  Palliative Care consulted.  10 hours SLED overnight with removal of 2.1 L. A couple episodes of RVR overnight--increasing metoprolol this morning.  2/15:  Clotted off  on CRRT overnight and restarted.  Thoracentesis performed with removal of 1.45L--sent for analysis.  Developed re-expansion pulmonary edema and started on Airvo.  :  Airvo weaned down to 50L40% this morning.  Transudative effusion on analysis, remaining studies pending.  Started on vasopressor support overnight to support volume removal with CRRT.  :  Weaned off Airvo overnight, now on HFNC at 12 L/min.  On SLED overnight with a couple episodes of clotting off.  Tolerated SLED off norepinephrine.    : Weaned to 4L this am. Feels weak and tired today  : No significant changes  : Stepped down to HM. Stable on 1L. Nephrology following for HD. Working with PT/OT.    Interventional cardiology consulted for [facility].    Patient feels better. She is able to lay flat without dyspnea but does have MSK pain.     Past Medical History:   Diagnosis Date    Anxiety     Celiac disease 2018    Celiac disease     Depression     Diabetes mellitus     Family history of breast cancer in mother      at age 68    Hyperlipidemia     Hypertension     Meniere disease     Meniere's disease, unspecified ear     Menopause     Peptic ulcer     Peritonitis 2023    Reflux esophagitis     Urinary tract infection     Vaginal infection     Vaginal Pap smear 2014    Pap/Hpv Negative        Past Surgical History:   Procedure Laterality Date    APPENDECTOMY      BREAST SURGERY      Tags    CARPAL TUNNEL RELEASE Bilateral 2017    ,     CLOSURE, COLOSTOMY Left 2023    Procedure: CLOSURE, COLOSTOMY;  Surgeon: Manuel Javier MD;  Location: [facility] OR;  Service: General;  Laterality: Left;    COLONOSCOPY  2018    Normal  (Cornell Velazco)     COLOSTOMY Right 2023    Procedure: CREATION, COLOSTOMY;  Surgeon: Manuel Javier MD;  Location: [facility] OR;  Service: General;  Laterality: Right;    DILATION AND CURETTAGE OF UTERUS  1986    DUPUYTREN CONTRACTURE RELEASE Bilateral 2017     ESOPHAGOGASTRODUODENOSCOPY N/A 1/24/2024    Procedure: EGD (ESOPHAGOGASTRODUODENOSCOPY);  Surgeon: Yamilet Walters MD;  Location: [facility] ENDO (2ND FLR);  Service: Gastroenterology;  Laterality: N/A;    ESOPHAGOGASTRODUODENOSCOPY N/A 1/26/2024    Procedure: EGD (ESOPHAGOGASTRODUODENOSCOPY);  Surgeon: Yamilet Walters MD;  Location: [facility] ENDO (2ND FLR);  Service: Gastroenterology;  Laterality: N/A;    HYSTERECTOMY  1987    BEAU w/ appy, no BSO     IMPLANTATION OF LEADLESS PACEMAKER N/A 1/16/2024    Procedure: INSERTION, CARDIAC PACEMAKER, LEADLESS;  Surgeon: LENIN Frances MD;  Location: [facility] EP LAB;  Service: Cardiology;  Laterality: N/A;  SB, LEADLESS PPM (MICRA), MDT, ANES, EH, CVICU [id number]    INJECTION OF NEUROLYTIC AGENT AROUND LUMBAR SYMPATHETIC NERVE N/A 1/6/2022    Procedure: BLOCK, LUMBAR SYMPATHETIC;  Surgeon: Souleymane Rizo Jr., MD;  Location: [facility] PAIN MGT;  Service: Pain Management;  Laterality: N/A;  no pacemaker   pt is diabetic     INJECTION OF NEUROLYTIC AGENT AROUND LUMBAR SYMPATHETIC NERVE N/A 8/25/2022    Procedure: BLOCK, LUMBAR SYMPATHETIC;  Surgeon: Souleymane Rizo Jr., MD;  Location: [facility] PAIN MGT;  Service: Pain Management;  Laterality: N/A;  diabetic     INSERTION OF TUNNELED CENTRAL VENOUS HEMODIALYSIS CATHETER Right 1/25/2023    Procedure: INSERTION, CATHETER, HEMODIALYSIS, DUAL LUMEN;  Surgeon: Manuel Javier MD;  Location: [facility] OR;  Service: General;  Laterality: Right;    INSERTION, CATHETER, TUNNELED Left 1/28/2023    Procedure: Insertion,catheter,tunneled;  Surgeon: Watson Fontenot MD;  Location: [facility] OR;  Service: General;  Laterality: Left;    LAPAROTOMY, EXPLORATORY N/A 1/14/2023    Procedure: LAPAROTOMY, EXPLORATORY;  Surgeon: Manuel Javier MD;  Location: [facility] OR;  Service: General;  Laterality: N/A;    LAPAROTOMY, EXPLORATORY N/A 1/16/2023    Procedure: LAPAROTOMY, EXPLORATORY;  Surgeon: Manuel Javier MD;  Location: [facility];  Service: General;  Laterality:  N/A;    LYMPHADENECTOMY Right 1/2/2024    Procedure: LYMPHADENECTOMY;  Surgeon: Tan Thomson MD;  Location: [facility] OR [facility] FLR;  Service: Cardiothoracic;  Laterality: Right;    PACEMAKER, TEMPORARY, TRANSVENOUS, PEDIATRIC Right 1/12/2024    Procedure: Pacemaker, Temporary, Transvenous;  Surgeon: Todd Carlisle MD;  Location: [facility] CATH LAB;  Service: Cardiology;  Laterality: Right;    REMOVAL OF CATHETER Right 1/28/2023    Procedure: REMOVAL, CATHETER;  Surgeon: Watson Fontenot MD;  Location: [facility] OR;  Service: General;  Laterality: Right;    REMOVAL, TUNNELED CATH Right 1/25/2023    Procedure: REMOVAL, TUNNELED CATH;  Surgeon: Manuel Javier MD;  Location: [facility] OR;  Service: General;  Laterality: Right;    ROBOTIC BRONCHOSCOPY N/A 10/20/2023    Procedure: ROBOTIC BRONCHOSCOPY;  Surgeon: Mayda Monzon MD;  [facility];  Service: Pulmonary;  Laterality: N/A;    SHOULDER SURGERY  2009 & 2010    right rotator cuff    THORACOTOMY Right 1/2/2024    Procedure: THORACOTOMY;  Surgeon: Tan Thomson MD;  [facility];  Service: Cardiothoracic;  Laterality: Right;    THORACOTOMY, WITH INITIAL THERAPEUTIC WEDGE RESECTION OF LUNG Right 1/2/2024    Procedure: THORACOTOMY, WITH INITIAL THERAPEUTIC WEDGE RESECTION OF LUNG;  Surgeon: Tan Thomson MD;  [facility];  Service: Cardiothoracic;  Laterality: Right;    TONSILLECTOMY      UPPER GASTROINTESTINAL ENDOSCOPY  04/2018       Review of patient's allergies indicates:   Allergen Reactions    Crestor [rosuvastatin] Swelling    Gluten protein        Facility-Administered Medications Prior to Admission   Medication    [DISCONTINUED] capsaicin-skin cleanser patch 1 patch    [DISCONTINUED] capsaicin-skin cleanser patch 2 patch     PTA Medications   Medication Sig    atorvastatin (LIPITOR) 40 MG tablet Take 1 tablet (40 mg total) by mouth once daily. (Patient taking differently: Take 40 mg by mouth every evening.)    B  complex-vitamin C-folic acid (MELLISSA-BENJY) 0.8 mg Tab Take 1 tablet (0.8 mg total) by mouth once daily.    [] cefpodoxime (VANTIN) 200 MG tablet Take 1 tablet (200 mg total) by mouth every evening. for 13 days    insulin aspart U-100 (NOVOLOG) 100 unit/mL (3 mL) InPn pen Inject before meals:  150-200=+1, 201-250=+2, 251-300=+3; 301-350=+4, over 350=+5 units    meclizine (ANTIVERT) 25 mg tablet Take 1 tablet (25 mg total) by mouth 3 (three) times daily as needed for Dizziness.    metoprolol tartrate (LOPRESSOR) 25 MG tablet Take 0.5 tablets (12.5 mg total) by mouth 2 (two) times daily.    [] metroNIDAZOLE (FLAGYL) 500 MG tablet Take 1 tablet (500 mg total) by mouth every 12 (twelve) hours. for 13 days    midodrine (PROAMATINE) 10 MG tablet Take 1 tablet (10 mg total) by mouth 2 (two) times daily with meals.    pantoprazole (PROTONIX) 40 MG tablet Take 1 tablet (40 mg total) by mouth 2 (two) times daily before meals.    patiromer calcium sorbitex (VELTASSA) 8.4 gram PwPk Take 2 packets (16.8 g total) by mouth once daily.    pregabalin (LYRICA) 75 MG capsule Take 1 capsule (75 mg total) by mouth Daily. Give after HD (Patient taking differently: Take 75 mg by mouth every other day. Give after HD)    sevelamer HCL (RENAGEL) 800 MG Tab Take 2 tablets (1,600 mg total) by mouth 3 (three) times daily with meals.    torsemide (DEMADEX) 20 MG Tab Take 20 mg by mouth 2 (two) times daily.    venlafaxine (EFFEXOR) 37.5 MG Tab Take 1 tablet (37.5 mg total) by mouth once daily.    betahistine HCl (BETAHISTINE, BULK, MISC)     blood sugar diagnostic (TRUE METRIX GLUCOSE TEST STRIP) Strp USE ONE STRIP FOR TESTING 2 TIMES A DAY    blood-glucose meter kit Use to test twice a day    calcium-vitamin D3 (OS-CIPRIANO 500 + D3) 500 mg-5 mcg (200 unit) per tablet Take 1 tablet by mouth once daily.    ferrous gluconate (FERGON) 324 MG tablet Take 1 tablet (324 mg total) by mouth daily with breakfast.    lancets Misc 1 lancet by  "Misc.(Non-Drug; Combo Route) route 4 (four) times daily.    nutritional supplements Liqd Take 237 mLs by mouth 4 (four) times daily.    pen needle, diabetic (BD ULTRA-FINE MARIS PEN NEEDLE) 32 gauge x 5/32" Ndle 1 Device by Misc.(Non-Drug; Combo Route) route 4 (four) times daily with meals and nightly.    vit C,Y-Uu-quaop-lutein-zeaxan (PRESERVISION AREDS 2) 661-939-23-1 mg-unit-mg-mg Cap      Family History       Problem Relation (Age of Onset)    Arthritis Father    Breast cancer Mother, Paternal Aunt    Cancer Mother, Paternal Aunt    Diabetes Paternal Grandfather    Hyperlipidemia Sister    Vision loss Father, Mother, Sister          Tobacco Use    Smoking status: Never    Smokeless tobacco: Never   Substance and Sexual Activity    Alcohol use: No    Drug use: Never    Sexual activity: Not Currently     Partners: Male     Birth control/protection: See Surgical Hx     Comment: :      Review of Systems   Constitutional: Negative for chills, fever and night sweats.   HENT:  Negative for congestion and hearing loss.    Eyes:  Negative for blurred vision, discharge, pain and visual disturbance.   Cardiovascular:  Negative for chest pain, dyspnea on exertion, leg swelling, near-syncope and palpitations.   Respiratory:  Negative for cough, hemoptysis, shortness of breath and wheezing.    Endocrine: Negative for cold intolerance and heat intolerance.   Skin:  Negative for flushing.   Musculoskeletal:  Positive for back pain. Negative for muscle weakness, myalgias and neck pain.   Gastrointestinal:  Negative for abdominal pain, constipation, diarrhea, nausea and vomiting.   Genitourinary:  Negative for dysuria and hematuria.   Neurological:  Negative for focal weakness, headaches, numbness and paresthesias.   Psychiatric/Behavioral:  Negative for altered mental status and memory loss. The patient is not nervous/anxious.      Objective:     Vital Signs (Most Recent):  Temp: 97.7 °F (36.5 °C) (02/21/24 0751)  Pulse: " 79 (02/21/24 0751)  Resp: 18 (02/21/24 0751)  BP: 117/70 (02/21/24 0751)  SpO2: 100 % (02/21/24 0751) Vital Signs (24h Range):  Temp:  [96.8 °F (36 °C)-98.1 °F (36.7 °C)] 97.7 °F (36.5 °C)  Pulse:  [72-89] 79  Resp:  [18-20] 18  SpO2:  [99 %-100 %] 100 %  BP: (117-170)/() 117/70     Weight: 67.6 kg (149 lb 0.5 oz)  Body mass index is 24.8 kg/m².    SpO2: 100 %       No intake or output data in the 24 hours ending 02/21/24 0854    Lines/Drains/Airways       Central Venous Catheter Line  Duration             Permacath right subclavian -- days         Hemodialysis Catheter 01/25/23 1501 right internal jugular 391 days              Peripheral Intravenous Line  Duration                  Hemodialysis AV Fistula 09/01/23 0800 Left forearm 173 days         Peripheral IV - Single Lumen 02/16/24 2215 18 G Anterior;Distal;Left Forearm 4 days                     Physical Exam  Constitutional:       General: She is not in acute distress.     Appearance: Normal appearance. She is not ill-appearing or diaphoretic.   HENT:      Head: Normocephalic and atraumatic.   Eyes:      General: No scleral icterus.        Right eye: No discharge.         Left eye: No discharge.      Extraocular Movements: Extraocular movements intact.      Conjunctiva/sclera: Conjunctivae normal.   Cardiovascular:      Rate and Rhythm: Normal rate and regular rhythm.      Pulses: Normal pulses.      Comments: 1+ radial bilateral  2+ femoral bilateral  2+ pedal bilateral  Pulmonary:      Effort: Pulmonary effort is normal. No respiratory distress.   Musculoskeletal:         General: Normal range of motion.   Skin:     General: Skin is warm and dry.   Neurological:      General: No focal deficit present.      Mental Status: She is alert and oriented to person, place, and time.   Psychiatric:         Mood and Affect: Mood normal.         Behavior: Behavior normal.            Significant Labs: All pertinent lab results from the last 24 hours have been  reviewed.      Assessment and Plan:     Cardiac/Vascular  Acute decompensated heart failure  Acute new onset systolic HF 15-20% with mod-severe MR  Patient will need C to evaluate for ischemic cardiomyopathy.  -LHC +/- PCI, patient is a ANTONIO candidate  - Anti-platelet Therapy: ASA  - Access: Right radial  - Creatinine/CrCl: dialysis  - Allergies: no Iodine allergy  - Pre-Hydration: none (on dialysis)  - Pre-Op Med: Bendaryl 50mg pO   - All patient's questions were answered.  -The risks, benefits and alternatives of the procedure were explained to the patient.   -The risks of coronary angiography include but are not limited to: bleeding, infection, heart rhythm abnormalities, allergic reactions, kidney injury and potential need for dialysis, stroke and death.   - Should stenting be indicated, the patient has agreed to dual anti-platelet therapy for 1-consecutive year with a drug-eluting stent and a minimum of 1-month with the use of a bare metal stent  - Additionally, pt is aware that non-compliance is likely to result in stent clotting with heart attack, heart failure, and/or death  -The risks of moderate sedation include hypotension, respiratory depression, arrhythmias, bronchospasm, and death.   - Informed consent was obtained and the  patient is agreeable to proceed with the procedure.          VTE Risk Mitigation (From admission, onward)           Ordered     heparin (porcine) injection 5,000 Units  Every 8 hours         02/12/24 0416     IP VTE HIGH RISK PATIENT  Once         02/12/24 0416     Place sequential compression device  Until discontinued         02/12/24 0416     heparin (porcine) injection 1,000 Units  As needed (PRN)         02/12/24 0221                    Thank you for your consult. I will follow-up with patient. Please contact us if you have any additional questions.    Darcy Ogden MD  Interventional Cardiology   [facility] - Telemetry Stepdown

## 2024-02-21 NOTE — ASSESSMENT & PLAN NOTE
Patient's FSGs are controlled on current medication regimen.  Last A1c reviewed-   Lab Results   Component Value Date    HGBA1C 4.9 11/15/2023     Most recent fingerstick glucose reviewed-   Recent Labs   Lab 02/19/24  2118 02/20/24  0910 02/20/24  1713   POCTGLUCOSE 171* 186* 214*     Current correctional scale  Low  Maintain anti-hyperglycemic dose as follows-   Antihyperglycemics (From admission, onward)      Start     Stop Route Frequency Ordered    02/15/24 1054  insulin aspart U-100 pen 0-10 Units         -- SubQ Before meals & nightly PRN 02/15/24 1055          Hold Oral hypoglycemics while patient is in the hospital.

## 2024-02-21 NOTE — ASSESSMENT & PLAN NOTE
Creatine stable for now. BMP reviewed- noted Estimated Creatinine Clearance: 18 mL/min (A) (based on SCr of 2.5 mg/dL (H)). according to latest data. Based on current GFR, CKD stage is end stage.  Monitor UOP and serial BMP and adjust therapy as needed. Renally dose meds. Avoid nephrotoxic medications and procedures.    - renal following for HD needs

## 2024-02-21 NOTE — SUBJECTIVE & OBJECTIVE
Interval History: more alert today,  Na remains low, continued aggressive fluid removal, ongoing goc conversations     Past Medical History:   Diagnosis Date    Anxiety     Celiac disease 2018    Celiac disease     Depression     Diabetes mellitus     Family history of breast cancer in mother      at age 68    Hyperlipidemia     Hypertension     Meniere disease     Meniere's disease, unspecified ear     Menopause     Peptic ulcer     Reflux esophagitis     Urinary tract infection     Vaginal infection     Vaginal Pap smear 2014    Pap/Hpv Negative        Past Surgical History:   Procedure Laterality Date    APPENDECTOMY      BREAST SURGERY      Tags    CARPAL TUNNEL RELEASE Bilateral 2017    ,     CLOSURE, COLOSTOMY Left 2023    Procedure: CLOSURE, COLOSTOMY;  Surgeon: Manuel Javier MD;  Location: [facility] OR;  Service: General;  Laterality: Left;    COLONOSCOPY  2018    Normal  ( Juan A)     COLOSTOMY Right 2023    Procedure: CREATION, COLOSTOMY;  Surgeon: Manuel Javier MD;  Location: [facility] OR;  Service: General;  Laterality: Right;    DILATION AND CURETTAGE OF UTERUS  1986    DUPUYTREN CONTRACTURE RELEASE Bilateral 2017    ESOPHAGOGASTRODUODENOSCOPY N/A 2024    Procedure: EGD (ESOPHAGOGASTRODUODENOSCOPY);  Surgeon: Yamilet Walters MD;  Location: [facility] ([address] [place] [address]);  Service: Gastroenterology;  Laterality: N/A;    ESOPHAGOGASTRODUODENOSCOPY N/A 2024    Procedure: EGD (ESOPHAGOGASTRODUODENOSCOPY);  Surgeon: Yamilet Walters MD;  [facility]  Service: Gastroenterology;  Laterality: N/A;    HYSTERECTOMY      BEAU w/ appy, no BSO     IMPLANTATION OF LEADLESS PACEMAKER N/A 2024    Procedure: INSERTION, CARDIAC PACEMAKER, LEADLESS;  Surgeon: LENIN Frances MD;  Location: [facility] EP LAB;  Service: Cardiology;  Laterality: N/A;  SB, LEADLESS PPM (MICRA), MDT, ANES, EH, CVICU [id number]    INJECTION OF  NEUROLYTIC AGENT AROUND LUMBAR SYMPATHETIC NERVE N/A 1/6/2022    Procedure: BLOCK, LUMBAR SYMPATHETIC;  Surgeon: Souleymane Rizo Jr., MD;  Location: [facility] PAIN MGT;  Service: Pain Management;  Laterality: N/A;  no pacemaker   pt is diabetic     INJECTION OF NEUROLYTIC AGENT AROUND LUMBAR SYMPATHETIC NERVE N/A 8/25/2022    Procedure: BLOCK, LUMBAR SYMPATHETIC;  Surgeon: Souleymane Rizo Jr., MD;  Location: [facility] PAIN MGT;  Service: Pain Management;  Laterality: N/A;  diabetic     INSERTION OF TUNNELED CENTRAL VENOUS HEMODIALYSIS CATHETER Right 1/25/2023    Procedure: INSERTION, CATHETER, HEMODIALYSIS, DUAL LUMEN;  Surgeon: Manuel Javier MD;  Location: [facility] OR;  Service: General;  Laterality: Right;    INSERTION, CATHETER, TUNNELED Left 1/28/2023    Procedure: Insertion,catheter,tunneled;  Surgeon: Watson Fontenot MD;  Location: [facility] OR;  Service: General;  Laterality: Left;    LAPAROTOMY, EXPLORATORY N/A 1/14/2023    Procedure: LAPAROTOMY, EXPLORATORY;  Surgeon: Manuel Javier MD;  Location: [facility] OR;  Service: General;  Laterality: N/A;    LAPAROTOMY, EXPLORATORY N/A 1/16/2023    Procedure: LAPAROTOMY, EXPLORATORY;  Surgeon: Manuel Javier MD;  Location: [facility] OR;  Service: General;  Laterality: N/A;    LYMPHADENECTOMY Right 1/2/2024    Procedure: LYMPHADENECTOMY;  Surgeon: Tan Thomson MD;  Location: [address];  Service: Cardiothoracic;  Laterality: Right;    PACEMAKER, TEMPORARY, TRANSVENOUS, PEDIATRIC Right 1/12/2024    Procedure: Pacemaker, Temporary, Transvenous;  Surgeon: Todd Carlisle MD;  Location: [facility] CATH LAB;  Service: Cardiology;  Laterality: Right;    REMOVAL OF CATHETER Right 1/28/2023    Procedure: REMOVAL, CATHETER;  Surgeon: Watson Fontenot MD;  Location: [facility] OR;  Service: General;  Laterality: Right;    REMOVAL, TUNNELED CATH Right 1/25/2023    Procedure: REMOVAL, TUNNELED CATH;  Surgeon: Manuel Javier MD;  Location: [facility] OR;  Service: General;   Laterality: Right;    ROBOTIC BRONCHOSCOPY N/A 10/20/2023    Procedure: ROBOTIC BRONCHOSCOPY;  Surgeon: Mayda Monzon MD;  Location: [facility] OR [address] [place] [address];  Service: Pulmonary;  Laterality: N/A;    SHOULDER SURGERY  2009 & 2010    right rotator cuff    THORACOTOMY Right 1/2/2024    Procedure: THORACOTOMY;  Surgeon: Tan Thomson MD;  [facility];  Service: Cardiothoracic;  Laterality: Right;    THORACOTOMY, WITH INITIAL THERAPEUTIC WEDGE RESECTION OF LUNG Right 1/2/2024    Procedure: THORACOTOMY, WITH INITIAL THERAPEUTIC WEDGE RESECTION OF LUNG;  Surgeon: Tan Thomson MD;  [facility];  Service: Cardiothoracic;  Laterality: Right;    TONSILLECTOMY      UPPER GASTROINTESTINAL ENDOSCOPY  04/2018       Review of patient's allergies indicates:   Allergen Reactions    Crestor [rosuvastatin] Swelling    Gluten protein        Medications:  Continuous Infusions:      Scheduled Meds:   sodium chloride 0.9%   Intravenous Once    amiodarone  400 mg Oral BID    Followed by    [START ON 2/25/2024] amiodarone  200 mg Oral Daily    atorvastatin  40 mg Oral QHS    droNABinol  5 mg Oral BID AC    epoetin noble  50 Units/kg Subcutaneous Every Mon, Wed, Fri    ferrous gluconate  324 mg Oral Daily with breakfast    heparin (porcine)  5,000 Units Subcutaneous Q8H    NIFEdipine  30 mg Oral Daily    pantoprazole  40 mg Oral BID AC    pregabalin  75 mg Oral Every other day    venlafaxine  37.5 mg Oral Daily     PRN Meds:acetaminophen, albuterol-ipratropium, dextrose 10%, dextrose 10%, glucagon (human recombinant), glucose, glucose, insulin aspart U-100, melatonin, ondansetron, sodium chloride 0.9%    Family History       Problem Relation (Age of Onset)    Arthritis Father    Breast cancer Mother, Paternal Aunt    Cancer Mother, Paternal Aunt    Diabetes Paternal Grandfather    Hyperlipidemia Sister    Vision loss Father, Mother, Sister          Tobacco Use    Smoking status: Never     Smokeless tobacco: Never   Substance and Sexual Activity    Alcohol use: No    Drug use: Never    Sexual activity: Not Currently     Partners: Male     Birth control/protection: See Surgical Hx     Comment: :        Review of Systems   Respiratory:  Positive for shortness of breath.    Genitourinary:         Urinary retention    Neurological:  Positive for weakness.     Objective:     Vital Signs (Most Recent):  Temp: 98.1 °F (36.7 °C) (02/20/24 1944)  Pulse: 82 (02/20/24 1944)  Resp: 20 (02/20/24 1129)  BP: 138/80 (02/20/24 1944)  SpO2: 100 % (02/20/24 1944) Vital Signs (24h Range):  Temp:  [97.5 °F (36.4 °C)-98.4 °F (36.9 °C)] 98.1 °F (36.7 °C)  Pulse:  [82-92] 82  Resp:  [18-20] 20  SpO2:  [99 %-100 %] 100 %  BP: (127-170)/() 138/80     Weight: 67.6 kg (149 lb 0.5 oz)  Body mass index is 24.8 kg/m².       Physical Exam  Vitals and nursing note reviewed.   Constitutional:       Appearance: She is ill-appearing.   Cardiovascular:      Rate and Rhythm: Tachycardia present.   Pulmonary:      Comments: Dyspnea, supplemental oxygen   Abdominal:      General: There is no distension.   Genitourinary:     Comments: CRRT in progress   Skin:     General: Skin is warm.   Neurological:      Mental Status: She is alert.        Review of Symptoms      Symptom Assessment (ESAS 0-10 Scale)  Pain:  0  Dyspnea:  0  Anxiety:  0  Nausea:  0  Depression:  0  Anorexia:  0  Fatigue:  0  Insomnia:  0  Restlessness:  0  Agitation:  0 due to Acuity of condition         Pain Assessment in Advanced Demential Scale (PAINAD)   Breathing - Independent of vocalization:  0  Negative vocalization:  0  Facial expression:  1  Body language:  1  Consolability:  0  Total:  2    Performance Status:  60    Living Arrangements:  Lives with spouse    Psychosocial/Cultural:   See Palliative Psychosocial Note: Yes  **Primary  to Follow**  Palliative Care  Consult: Yes    Spiritual:  F - Deepika and Belief:  Judaism    A - Address in Care:   following       Advance Care Planning   Advance Directives:   Living Will: No    LaPOST: No    Do Not Resuscitate Status: No    Medical Power of : No      Decision Making:  Family answered questions and Patient unable to communicate due to disease severity/cognitive impairment  Goals of Care: What is most important right now is to focus on extending life as long as possible, even it it means sacrificing quality, curative/life-prolongation (regardless of treatment burdens). Accordingly, we have decided that the best plan to meet the patient's goals includes continuing with treatment.         Significant Labs: All pertinent labs within the past 24 hours have been reviewed.  CBC:   Recent Labs   Lab 02/20/24 0447   WBC 10.43   HGB 8.4*   HCT 26.0*   MCV 96          BMP:  Recent Labs   Lab 02/20/24 0447   *   *   K 4.7      CO2 23   BUN 16   CREATININE 1.3   CALCIUM 8.5*   MG 2.0       LFT:  Lab Results   Component Value Date    AST 28 02/20/2024    ALKPHOS 92 02/20/2024    BILITOT 0.5 02/20/2024     Albumin:   Albumin   Date Value Ref Range Status   02/20/2024 1.9 (L) 3.5 - 5.2 g/dL Final     Protein:   Total Protein   Date Value Ref Range Status   02/20/2024 5.8 (L) 6.0 - 8.4 g/dL Final     Lactic acid:   Lab Results   Component Value Date    LACTATE 1.8 02/12/2024    LACTATE 1.6 01/05/2024       Significant Imaging: I have reviewed all pertinent imaging results/findings within the past 24 hours.

## 2024-02-21 NOTE — NURSING
Bedside report from ARABELLA Monte. Pt AAOx4. VSS, no c/o pain or discomfort at this time, resp even and unlabored. Gauze/tegaderm dressing to right groin is CDI. No active bleeding. No hematoma noted. Post procedure protocol reviewed with patient. Understanding verbalized. Nurse call bell within reach. Call light in reach.

## 2024-02-21 NOTE — ASSESSMENT & PLAN NOTE
Nutrition consulted. Most recent weight and BMI monitored-     Measurements:  Wt Readings from Last 1 Encounters:   02/20/24 67.6 kg (149 lb 0.5 oz)   Body mass index is 24.8 kg/m².    Patient has been screened and assessed by RD.    Malnutrition Type:  Context: chronic illness  Level: moderate    Malnutrition Characteristic Summary:  Energy Intake (Malnutrition): less than 75% for greater than or equal to 1 month  Muscle Mass (Malnutrition): mild depletion  Fluid Accumulation (Malnutrition): severe    Interventions/Recommendations (treatment strategy):  1.Continue gluten free diet with texture per SLP 2. [facility]. RD to monitor and follow

## 2024-02-21 NOTE — SUBJECTIVE & OBJECTIVE
Interval History:     Past Medical History:   Diagnosis Date    Anxiety     Celiac disease 2018    Celiac disease     Depression     Diabetes mellitus     Family history of breast cancer in mother      at age 68    Hyperlipidemia     Hypertension     Meniere disease     Meniere's disease, unspecified ear     Menopause     Peptic ulcer     Peritonitis 2023    Reflux esophagitis     Urinary tract infection     Vaginal infection     Vaginal Pap smear 2014    Pap/Hpv Negative        Past Surgical History:   Procedure Laterality Date    APPENDECTOMY      BREAST SURGERY      Tags    CARPAL TUNNEL RELEASE Bilateral 2017    ,     CLOSURE, COLOSTOMY Left 2023    Procedure: CLOSURE, COLOSTOMY;  Surgeon: Manule Javier MD;  Location: [facility] OR;  Service: General;  Laterality: Left;    COLONOSCOPY  2018    Normal  ( Juan A)     COLOSTOMY Right 2023    Procedure: CREATION, COLOSTOMY;  Surgeon: Manuel Javier MD;  Location: [facility] OR;  Service: General;  Laterality: Right;    DILATION AND CURETTAGE OF UTERUS  1986    DUPUYTREN CONTRACTURE RELEASE Bilateral 2017    ESOPHAGOGASTRODUODENOSCOPY N/A 2024    Procedure: EGD (ESOPHAGOGASTRODUODENOSCOPY);  Surgeon: Yamilet Walters MD;  [facility]  Service: Gastroenterology;  Laterality: N/A;    ESOPHAGOGASTRODUODENOSCOPY N/A 2024    Procedure: EGD (ESOPHAGOGASTRODUODENOSCOPY);  Surgeon: Yamilet Walters MD;  Location: [facility] ENDO (2ND FLR);  Service: Gastroenterology;  Laterality: N/A;    HYSTERECTOMY      BEAU w/ appy, no BSO     IMPLANTATION OF LEADLESS PACEMAKER N/A 2024    Procedure: INSERTION, CARDIAC PACEMAKER, LEADLESS;  Surgeon: LENIN Frances MD;  Location: [facility] EP LAB;  Service: Cardiology;  Laterality: N/A;  SB, LEADLESS PPM (MICRA), MDT, ANES, EH, CVICU [id number]    INJECTION OF NEUROLYTIC AGENT AROUND LUMBAR SYMPATHETIC NERVE N/A 2022    Procedure: BLOCK, LUMBAR SYMPATHETIC;   Surgeon: Souleymane Rizo Jr., MD;  Location: [facility] PAIN MGT;  Service: Pain Management;  Laterality: N/A;  no pacemaker   pt is diabetic     INJECTION OF NEUROLYTIC AGENT AROUND LUMBAR SYMPATHETIC NERVE N/A 8/25/2022    Procedure: BLOCK, LUMBAR SYMPATHETIC;  Surgeon: Souleymane Rizo Jr., MD;  Location: [facility] PAIN MGT;  Service: Pain Management;  Laterality: N/A;  diabetic     INSERTION OF TUNNELED CENTRAL VENOUS HEMODIALYSIS CATHETER Right 1/25/2023    Procedure: INSERTION, CATHETER, HEMODIALYSIS, DUAL LUMEN;  Surgeon: Manuel Javier MD;  Location: [facility] OR;  Service: General;  Laterality: Right;    INSERTION, CATHETER, TUNNELED Left 1/28/2023    Procedure: Insertion,catheter,tunneled;  Surgeon: Watson Fontenot MD;  Location: [facility] OR;  Service: General;  Laterality: Left;    LAPAROTOMY, EXPLORATORY N/A 1/14/2023    Procedure: LAPAROTOMY, EXPLORATORY;  Surgeon: Manuel Javier MD;  Location: [facility] OR;  Service: General;  Laterality: N/A;    LAPAROTOMY, EXPLORATORY N/A 1/16/2023    Procedure: LAPAROTOMY, EXPLORATORY;  Surgeon: Manuel Javier MD;  Location: [facility] OR;  Service: General;  Laterality: N/A;    LYMPHADENECTOMY Right 1/2/2024    Procedure: LYMPHADENECTOMY;  Surgeon: Tan Thomson MD;  Location: [address];  Service: Cardiothoracic;  Laterality: Right;    PACEMAKER, TEMPORARY, TRANSVENOUS, PEDIATRIC Right 1/12/2024    Procedure: Pacemaker, Temporary, Transvenous;  Surgeon: Todd Carlisle MD;  Location: [facility] CATH LAB;  Service: Cardiology;  Laterality: Right;    REMOVAL OF CATHETER Right 1/28/2023    Procedure: REMOVAL, CATHETER;  Surgeon: Watson Fontenot MD;  Location: [facility] OR;  Service: General;  Laterality: Right;    REMOVAL, TUNNELED CATH Right 1/25/2023    Procedure: REMOVAL, TUNNELED CATH;  Surgeon: Manuel Javier MD;  Location: [facility] OR;  Service: General;  Laterality: Right;    ROBOTIC BRONCHOSCOPY N/A 10/20/2023    Procedure: ROBOTIC BRONCHOSCOPY;  Surgeon: Mayda Monzon  MD CONNIE;  Location: [address];  Service: Pulmonary;  Laterality: N/A;    SHOULDER SURGERY  2009 & 2010    right rotator cuff    THORACOTOMY Right 1/2/2024    Procedure: THORACOTOMY;  Surgeon: Tan Thomson MD;  Location: [address];  Service: Cardiothoracic;  Laterality: Right;    THORACOTOMY, WITH INITIAL THERAPEUTIC WEDGE RESECTION OF LUNG Right 1/2/2024    Procedure: THORACOTOMY, WITH INITIAL THERAPEUTIC WEDGE RESECTION OF LUNG;  Surgeon: Tan Thomson MD;  Location: [address];  Service: Cardiothoracic;  Laterality: Right;    TONSILLECTOMY      UPPER GASTROINTESTINAL ENDOSCOPY  04/2018       Review of patient's allergies indicates:   Allergen Reactions    Crestor [rosuvastatin] Swelling    Gluten protein        Medications:  Continuous Infusions:      Scheduled Meds:   sodium chloride 0.9%   Intravenous Once    amiodarone  400 mg Oral BID    Followed by    [START ON 2/25/2024] amiodarone  200 mg Oral Daily    aspirin  325 mg Oral Once    atorvastatin  40 mg Oral QHS    droNABinol  5 mg Oral BID AC    epoetin noble  50 Units/kg Subcutaneous Every Mon, Wed, Fri    ferrous gluconate  324 mg Oral Daily with breakfast    heparin (porcine)  5,000 Units Subcutaneous Q8H    NIFEdipine  30 mg Oral Daily    pantoprazole  40 mg Oral BID AC    pregabalin  75 mg Oral Every other day    venlafaxine  37.5 mg Oral Daily     PRN Meds:acetaminophen, albuterol-ipratropium, dextrose 10%, dextrose 10%, glucagon (human recombinant), glucose, glucose, insulin aspart U-100, melatonin, ondansetron, sodium chloride 0.9%, sodium chloride 0.9%    Family History       Problem Relation (Age of Onset)    Arthritis Father    Breast cancer Mother, Paternal Aunt    Cancer Mother, Paternal Aunt    Diabetes Paternal Grandfather    Hyperlipidemia Sister    Vision loss Father, Mother, Sister          Tobacco Use    Smoking status: Never    Smokeless tobacco: Never   Substance and Sexual Activity    Alcohol use: No    Drug  use: Never    Sexual activity: Not Currently     Partners: Male     Birth control/protection: See Surgical Hx     Comment: :        Review of Systems   Respiratory:  Positive for shortness of breath.    Genitourinary:         Urinary retention    Neurological:  Positive for weakness.     Objective:     Vital Signs (Most Recent):  Temp: 97.7 °F (36.5 °C) (02/21/24 0751)  Pulse: 75 (02/21/24 1107)  Resp: 18 (02/21/24 0751)  BP: 117/70 (02/21/24 0751)  SpO2: 100 % (02/21/24 0751) Vital Signs (24h Range):  Temp:  [96.8 °F (36 °C)-98.1 °F (36.7 °C)] 97.7 °F (36.5 °C)  Pulse:  [72-89] 75  Resp:  [18-20] 18  SpO2:  [99 %-100 %] 100 %  BP: (117-170)/() 117/70     Weight: 67.6 kg (149 lb 0.5 oz)  Body mass index is 24.8 kg/m².       Physical Exam  Vitals and nursing note reviewed.   Constitutional:       Appearance: She is ill-appearing.   Cardiovascular:      Rate and Rhythm: Tachycardia present.   Pulmonary:      Comments: Dyspnea, supplemental oxygen   Abdominal:      General: There is no distension.   Genitourinary:     Comments: CRRT in progress   Skin:     General: Skin is warm.   Neurological:      Mental Status: She is alert.          Review of Symptoms      Symptom Assessment (ESAS 0-10 Scale)  Pain:  0  Dyspnea:  0  Anxiety:  0  Nausea:  0  Depression:  0  Anorexia:  0  Fatigue:  0  Insomnia:  0  Restlessness:  0  Agitation:  0 due to Acuity of condition         Pain Assessment in Advanced Demential Scale (PAINAD)   Breathing - Independent of vocalization:  0  Negative vocalization:  0  Facial expression:  1  Body language:  1  Consolability:  0  Total:  2    Performance Status:  60    Living Arrangements:  Lives with spouse    Psychosocial/Cultural:   See Palliative Psychosocial Note: Yes  **Primary  to Follow**  Palliative Care  Consult: Yes    Spiritual:  F - Deepika and Belief:  Restorationism   A - Address in Care:   following         Advance Care Planning   Advance  Directives:   Living Will: No    LaPOST: No    Do Not Resuscitate Status: No    Medical Power of : No      Decision Making:  Family answered questions and Patient unable to communicate due to disease severity/cognitive impairment  Goals of Care: What is most important right now is to focus on extending life as long as possible, even it it means sacrificing quality, curative/life-prolongation (regardless of treatment burdens). Accordingly, we have decided that the best plan to meet the patient's goals includes continuing with treatment.         Significant Labs: All pertinent labs within the past 24 hours have been reviewed.  CBC:   Recent Labs   Lab 02/21/24  0746   WBC 9.00   HGB 7.9*   HCT 25.7*   MCV 99*          BMP:  Recent Labs   Lab 02/21/24  0746   *   *   K 4.3      CO2 23   BUN 35*   CREATININE 2.5*   CALCIUM 8.4*   MG 2.1       LFT:  Lab Results   Component Value Date    AST 17 02/21/2024    ALKPHOS 89 02/21/2024    BILITOT 0.3 02/21/2024     Albumin:   Albumin   Date Value Ref Range Status   02/21/2024 1.9 (L) 3.5 - 5.2 g/dL Final     Protein:   Total Protein   Date Value Ref Range Status   02/21/2024 5.8 (L) 6.0 - 8.4 g/dL Final     Lactic acid:   Lab Results   Component Value Date    LACTATE 1.8 02/12/2024    LACTATE 1.6 01/05/2024       Significant Imaging: I have reviewed all pertinent imaging results/findings within the past 24 hours.

## 2024-02-21 NOTE — DISCHARGE INSTRUCTIONS
Do not strain or lift anything greater than 5 lb for 1 week.  Do not drive or operate any dangerous machinery for 24 hours.   Clean the area with mild soap and water and then cover it with a bandage.   Once the skin has healed, bathing in a tub or swimming is allowed.   Inspect the groin site daily and report to the physician any swelling at the site that   cannot be controlled with manual pressure for 10 minutes, unusual pain at the   access site or affected extremity, unusual swelling at the access site, or signs or   symptoms of infection such as redness, pain, or fever.   Call 911 if you have:   Bleeding from the puncture site that you cannot stop by doing the following:   Relax and lie down right away. Keep your leg flat and apply firm pressure to the site using your fingers and a gauze pad. Keep the pressure on for 20 minutes. Continue this until the bleeding stops. This may take awhile. When bleeding stops, cover the site with a sterile bandage and keep your leg still as much as possible.

## 2024-02-21 NOTE — PT/OT/SLP PROGRESS
Occupational Therapy   Treatment    Name: Lily Green  MRN: [id number]  Admitting Diagnosis:  Acute hypoxemic respiratory failure  Day of Surgery    Recommendations:     Discharge Recommendations: Moderate Intensity Therapy  Discharge Equipment Recommendations:  wheelchair  Barriers to discharge:  Other (Comment) (increased (A) required)    Assessment:     Lily Green is a 73 y.o. female with a medical diagnosis of Acute hypoxemic respiratory failure.  She presents with the following performance deficits affecting function are weakness, impaired endurance, impaired self care skills, decreased upper extremity function, decreased lower extremity function, impaired functional mobility, gait instability, decreased safety awareness, impaired balance, impaired cardiopulmonary response to activity. Pt with increased breathing during transfers with no AD and requires frequent seated rest breaks in-between transfers.     Rehab Prognosis:  Fair; patient would benefit from acute skilled OT services to address these deficits and reach maximum level of function.       Plan:     Patient to be seen 4 x/week to address the above listed problems via self-care/home management, therapeutic activities, therapeutic exercises, neuromuscular re-education  Plan of Care Expires: 02/29/24  Plan of Care Reviewed with: patient, spouse    Subjective     Chief Complaint: None stated  Patient/Family Comments/goals: Pt reports she has a procedure today.  Pain/Comfort:  Pain Rating 1: 0/10    Objective:     Communicated with: Nurse (Paula) prior to session.  Patient found supine with PureWick upon OT entry to room.  A client care conference was completed by the OTR and the PETERSON prior to treatment by the PETERSON to discuss the patient's POC and current status.   General Precautions: Standard, fall, NPO    Orthopedic Precautions:N/A  Braces: N/A  Respiratory Status: Nasal cannula     Occupational Performance:     Bed Mobility:     Patient completed Scooting EOB  with minimum assistance  Patient completed Supine to Sit with moderate assistance     Functional Mobility/Transfers:  Patient completed Sit <> Stand Transfer from EOB with minimum assistance  with  no assistive device and hand-held assist x 2 trials; seated rest break taken in-between trial  Patient completed Bed <> Chair Transfer using Step Transfer technique with minimum assistance with no assistive device and hand-held assist  Pt performed sit <>stand transfer from bed side chair x 2 trials with no AD and min/Mod A    Activities of Daily Living:  Pt required total assistance for perineal hygiene in standing along bedside chair due to pt soiled from BM.  Pt required max assistance to weight shift L > R while seated in chair after completion of malini-hygiene  Total Assistance for don/doff PureWick  Pt performed washing face with washcloth with set up assistance seated EOB  Total assistance to hu socks on ea foot while seated EOB  Upper Body Dressing: Mod assistance to doff gown worn like a robe while seated 2/2 pt soiled from BM    SpO2 at 100% while seated in chair at end of session.    [facility] 6 Click ADL: 12    Treatment & Education:  Pt performed static sitting EOB ~12-13 minutes to improve sitting tolerance and static sitting balance.  Pt performed bed mobility, functional mobility/transfers, and ADLs as documented above.   Pt and spouse educated and instructed on the following:  OT POC  Role of PETERSON  Use of call bell for all assistance  Importance of sitting up in chair/OOB mobility to improve activity tolerance, strength and endurance  Pt instructed to sit up in chair for at least an hour as tolerable and call for assistance for transfer back to bed.  Addressed questions and /or concerns within PETERSON scope of practice  Pt and spouse verbalized understanding     Patient left up in chair with all lines intact, call button in reach, RN notified, and spouse present.    GOALS:    Multidisciplinary Problems       Occupational Therapy Goals          Problem: Occupational Therapy    Goal Priority Disciplines Outcome Interventions   Occupational Therapy Goal     OT, PT/OT Ongoing, Progressing    Description: Goals to be met by: 03-04-24     Patient will increase functional independence with ADLs by performing:    Feeding with Supervision.  UE Dressing with Minimal Assistance.  LE Dressing with Moderate Assistance.  Grooming while seated with Supervision.  Toileting from bedside commode with Moderate Assistance for hygiene and clothing management.   Supine to sit with Stand-by Assistance.  Stand pivot transfers with Contact Guard Assistance.  Step transfer with Contact Guard Assistance  Toilet transfer to bedside commode with Contact Guard Assistance.  Pt. To be Independent with hEP to BUE to improve level of endurance                         Time Tracking:     OT Date of Treatment: 02/21/24  OT Start Time: 0847  OT Stop Time: 0940  OT Total Time (min): 53 min    Billable Minutes:Self Care/Home Management 23  Therapeutic Activity 30    OT/RICHARD: RICHARD     Number of RICHARD visits since last OT visit: 1    2/21/2024

## 2024-02-21 NOTE — ASSESSMENT & PLAN NOTE
ESRD   Hypoxic resp failure - improved with 1.5 liter removal;   ECHO 2/12 with newly depressed EF of 10-15% from 60-65% one month prior    Plan/Recommendation    -Plan for HD today at bedside 2/21  -Keep MAP > 65  -Keep hemoglobin > 7  -Strict ins and outs  -Avoid nephrotoxic agents if possible and renally dose medications  -Avoid drastic hemodynamic changes if possible  -1L fluid restrictions  -protein supplements with meals

## 2024-02-21 NOTE — PLAN OF CARE
Patient received to sscu room 11  s/p angiogram. Report received from ARABELLA Brewer. Patient is sleepy, but arousable. Able to answer orientation questions appropriately. Spoke with spouse Deric via phone to update. Right groin with dry gauze/tegaderm dressing intact. No bleeding, no hematoma. Noted. Bedrest instructions reviewed with patient. See flowsheet charting. Call light placed within reach. Will monitor.

## 2024-02-21 NOTE — PROGRESS NOTES
[facility] - Telemetry [facility] Medicine  Progress Note    Patient Name: Lily Green  MRN: [id number]  Patient Class: IP- Inpatient   Admission Date: 2/12/2024  Length of Stay: 8 days  Attending Physician: Ewa Meyer MD  Primary Care Provider: Pavel Calixto MD        Subjective:     Principal Problem:Acute hypoxemic respiratory failure        HPI:  Ms. Green is a 73 y.o. 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 She was last admitted from 1/2/24-2/5/24 (35 days) on the thoracic surgery service during which she underwent a right lower lobectomy for resection of her carcinoid tumor and MLND, ultimately requiring a thoracotomy. Post-op, she received a right sided chest tube. 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 Subsequent GIB and discovery of nonbleeding gastric ulcers on EGD made full anticoagulation high risk, so it was stopped. She was ultimately Dc'd to [facility] in [place] where she presents from now.      She presents today from SNF with increased work of breathing, dyspnea, and hypoxia, with oxygen saturation in the 70% on 2L. She became acutely short of breath today and was brought in by EMS after being put on CPAP in the field with some improvement. Placed on BIPAP in the ED. She continues to be tachycardic and tachypnic. She last completed a full session of HD on Saturday. She reports cough and nausea, but denies fever, chills, pain, chest pain, neck pain. She has little residual renal function . Unclear if she has been taking her Torsemide listed as her home med. Her workup was significant for BNP of 2754, Trop 0.312. VBG reveals pH 7.438/38.9/29. EKG reveals sinus tachycardia. CXR has evident pleural effusions and possible multifocal pneumonia. Nephro was consulted  and emergent HD orders were placed with goal of removing 3 L urgently. She will be admitted to MICU for hypoxic respiratory failure and increased work of breathing. CTA PE study is still pending at time of evaluation since patient is getting HD.     Overview/Hospital Course:  2/12:  Patient admitted for hypoxic respiratory failure.  Echocardiogram revealed new low LVEF with elevated troponins.  Cardiology consulted.  2/13:  Patient developed AFib with RVR this morning--started amiodarone infusion.  Restarted on SLED overnight.  CT findings with a couple fluid collections in the right paraesophageal space and azygoesophageal recess.  2/14:  CT Surgery consulted and recommended IR consultation for evaluation for drainage; unfortunately, no safe window for approach.  Interventional Cardiology deferring angiography due to respiratory status.  Palliative Care consulted.  10 hours SLED overnight with removal of 2.1 L. A couple episodes of RVR overnight--increasing metoprolol this morning.  2/15:  Clotted off on CRRT overnight and restarted.  Thoracentesis performed with removal of 1.45L--sent for analysis.  Developed re-expansion pulmonary edema and started on Airvo.  2/16:  Airvo weaned down to 50L40% this morning.  Transudative effusion on analysis, remaining studies pending.  Started on vasopressor support overnight to support volume removal with CRRT.  2/17:  Weaned off Airvo overnight, now on HFNC at 12 L/min.  On SLED overnight with a couple episodes of clotting off.  Tolerated SLED off norepinephrine.    2/18: Weaned to 4L this am. Feels weak and tired today  2/19: No significant changes    Stepped down to  2/20. Stable on 1L. Nephrology following for HD. Working with PT/OT. Interventional cardiology consulted for [facility].      Interval History: Stepped down to floor. Stable on 1L NC for comfort, no hypoxia noted. Working with PT/OT.     H/H stable.     Icards reconsulted for [facility] now that patient has clinically  improved.   Renal following for HD.   Elevated BP, regimen adjusted.     Review of Systems   Constitutional:  Positive for fatigue.   Respiratory:  Negative for shortness of breath.    Cardiovascular:  Negative for leg swelling.   Gastrointestinal:  Negative for blood in stool.   Neurological:  Positive for headaches.     Objective:     Vital Signs (Most Recent):  Temp: 97.7 °F (36.5 °C) (02/20/24 1540)  Pulse: 89 (02/20/24 1544)  Resp: 20 (02/20/24 1129)  BP: (!) 170/108 (02/20/24 1544)  SpO2: 99 % (02/20/24 1540) Vital Signs (24h Range):  Temp:  [97.5 °F (36.4 °C)-98.6 °F (37 °C)] 97.7 °F (36.5 °C)  Pulse:  [83-92] 89  Resp:  [17-35] 20  SpO2:  [95 %-100 %] 99 %  BP: (125-170)/() 170/108     Weight: 67.6 kg (149 lb 0.5 oz)  Body mass index is 24.8 kg/m².  No intake or output data in the 24 hours ending 02/20/24 1746      Physical Exam  Vitals and nursing note reviewed.   Constitutional:       General: She is not in acute distress.     Appearance: She is ill-appearing.   HENT:      Head: Normocephalic and atraumatic.      Nose: Nose normal.      Comments: NC in place     Mouth/Throat:      Mouth: Mucous membranes are moist.      Pharynx: Oropharynx is clear.   Eyes:      Extraocular Movements: Extraocular movements intact.      Conjunctiva/sclera: Conjunctivae normal.      Pupils: Pupils are equal, round, and reactive to light.   Cardiovascular:      Rate and Rhythm: Normal rate and regular rhythm.      Pulses: Normal pulses.      Heart sounds: Normal heart sounds.      Comments: Tunneled cath c/d/i    Pulmonary:      Effort: No respiratory distress.      Breath sounds: Normal breath sounds.      Comments: Mild tachypnea, anterior lung fields clear, mild crackles posterior bases  Abdominal:      General: Abdomen is flat. Bowel sounds are normal. There is no distension.      Palpations: Abdomen is soft.      Tenderness: There is no abdominal tenderness.   Musculoskeletal:         General: Normal range of  motion.      Cervical back: Normal range of motion and neck supple.      Right lower leg: No edema.      Left lower leg: No edema.   Skin:     General: Skin is warm and dry.   Neurological:      General: No focal deficit present.      Mental Status: She is alert and oriented to person, place, and time.   Psychiatric:         Behavior: Behavior normal.             Significant Labs: All pertinent labs within the past 24 hours have been reviewed.    Significant Imaging: I have reviewed all pertinent imaging results/findings within the past 24 hours.    Assessment/Plan:      * Acute hypoxemic respiratory failure  Patient with Hypoxic Respiratory failure which is Acute on chronic.  she is on home oxygen at 2 LPM. Supplemental oxygen was provided and noted-      .   Signs/symptoms of respiratory failure include- tachypnea, increased work of breathing, respiratory distress, and use of accessory muscles. Contributing diagnoses includes - Pleural effusion Labs and images were reviewed. Patient Has recent ABG, which has been reviewed. Will treat underlying causes and adjust management of respiratory failure as follows-     Patient without CHF on Echo (01/12/24) which demonstrated EF 60-65%, no significant valvulopathy, borderline pulm HTN. On torsemide at baseline. Patient reports having some high sodium foods lately. High suspicion for hypervolemia given BNP elevation, though this may be confounded by CKD.      - Improved w/ volume removal with HD  - wean O2 as tolerated, currently on 1L for comfort, no hypoxia noted  - Repeat echocardiogram with new LVEF 15-20% with concerns for hypoperfusion  - Cardiology consulted  - Interventional Cardiology deferring coronary angiography due to respiratory status- now that respiratory status has improved, reconsulted Icards for [facility]    Advanced care planning/counseling discussion  - palliative care following      Goals of care, counseling/discussion  Advance Care Planning  -palliative  care following         Shortness of breath  - see respiratory failure  - component of anxiety, cont anxiolytics       Moderate malnutrition  Nutrition consulted. Most recent weight and BMI monitored-     Measurements:  Wt Readings from Last 1 Encounters:   02/20/24 67.6 kg (149 lb 0.5 oz)   Body mass index is 24.8 kg/m².    Patient has been screened and assessed by RD.    Malnutrition Type:  Context: chronic illness  Level: moderate    Malnutrition Characteristic Summary:  Energy Intake (Malnutrition): less than 75% for greater than or equal to 1 month  Muscle Mass (Malnutrition): mild depletion  Fluid Accumulation (Malnutrition): severe    Interventions/Recommendations (treatment strategy):  1.Continue gluten free diet with texture per SLP 2. modify ONS to Novasource renal TID 3. RD to monitor and follow       Atrial flutter  - cont amio  - currently in NSR  - monitor on tele  - AC contraindicated      New onset of congestive heart failure  Patient is identified as having Systolic (HFrEF) heart failure that is Acute. CHF is currently uncontrolled due to Rales/crackles on pulmonary exam and Pulmonary edema/pleural effusion on CXR. Latest ECHO performed and demonstrates- Results for orders placed during the hospital encounter of 02/12/24    Echo    Interpretation Summary    Left Ventricle: The left ventricle is normal in size. Normal wall thickness. Normal wall motion. There is severely reduced systolic function with a visually estimated ejection fraction of 20 - 25%. There is diastolic dysfunction.    Right Ventricle: Normal right ventricular cavity size. Wall thickness is normal. Right ventricle wall motion has segmental dysfunction with akinesis of the mid to distal free wall. Systolic function is moderately reduced.    Left Atrium: Left atrium is moderately dilated.    Aortic Valve: Aortic valve thickening. There is moderate aortic valve sclerosis. There is annular calcification present.    IVC/SVC: Normal venous  "pressure at 3 mmHg.    Pericardium: Left pleural effusion.  monitor clinical status closely. Monitor on telemetry. Patient is on CHF pathway.  Monitor strict Is&Os and daily weights.  Place on fluid restriction of 1 L. Cardiology has been consulted. Continue to stress to patient importance of self efficacy and  on diet for CHF. Last BNP reviewed- and noted below No results for input(s): "BNP", "BNPTRIAGEBLO" in the last 168 hours.  - will clarify if patient tolerated ARB BB as previously recommended by cardiology,she came out of ICU with those meds held    Secondary hyperparathyroidism  - noted, renal following      Pericardial effusion  - as seen on ECHO: Pericardium: There is a trivial effusion. Left pleural effusion.   - cont HD for volume removal      Chronic kidney disease-mineral and bone disorder  Creatine stable for now. BMP reviewed- noted Estimated Creatinine Clearance: 34.7 mL/min (based on SCr of 1.3 mg/dL). according to latest data. Based on current GFR, CKD stage is end stage.  Monitor UOP and serial BMP and adjust therapy as needed. Renally dose meds. Avoid nephrotoxic medications and procedures.  - cont sevelamer  - renal following    GIB (gastrointestinal bleeding)  - Recent GIB on last admission with GI recommending 8 week course of Protonix s/p EGD with nonbleeding gastric ulcer. PPI end date 02/23/24 and follow up EGD planned at that time. Continue PPI while inpatient.      Pleural effusion on left  Patient found to have large pleural effusion on imaging. I have personally reviewed and interpreted the following imaging: Xray. A thoracentesis was performed. Pleural fluid was sent for analysis. Fluid most consistent with Transudate.  Most likely etiology includes Congestive Heart Failure and Renal Failure. Management to include Dialysis  - s/p 7 day course of abx  - pleural fluid cultured NGTD        Atrial fibrillation  Patient with Paroxysmal (<7 days) atrial fibrillation which is " controlled currently with Amiodarone. Patient is currently in sinus rhythm.[id number] Score: 3. HASBLED Score: 4. Anticoagulation not indicated due to GIB .  - developed RVR 2/13 started on amio infusion   - transitioned from lopressor to amio in ICU  - cont oral amio  - AC not indicated given high risk of bleeding and recent GIB previous admission     ESRD (end stage renal disease)  Creatine stable for now. BMP reviewed- noted Estimated Creatinine Clearance: 34.7 mL/min (based on SCr of 1.3 mg/dL). according to latest data. Based on current GFR, CKD stage is end stage.  Monitor UOP and serial BMP and adjust therapy as needed. Renally dose meds. Avoid nephrotoxic medications and procedures.    - renal following for HD needs    Anemia in ESRD (end-stage renal disease)  Patient's anemia is currently controlled. Has not received any PRBCs to date. Etiology likely d/t chronic disease due to Chronic Kidney Disease/ESRD  Current CBC reviewed-   Lab Results   Component Value Date    HGB 8.4 (L) 02/20/2024    HCT 26.0 (L) 02/20/2024     Monitor serial CBC and transfuse if patient becomes hemodynamically unstable, symptomatic or H/H drops below 7/21.    Generalized anxiety disorder  - cont home venlafaxine       Palliative care encounter  - palliative care following      Hyperlipidemia, mixed  - cont statin      Type 2 diabetes mellitus with diabetic polyneuropathy, without long-term current use of insulin  Patient's FSGs are controlled on current medication regimen.  Last A1c reviewed-   Lab Results   Component Value Date    HGBA1C 4.9 11/15/2023     Most recent fingerstick glucose reviewed-   Recent Labs   Lab 02/19/24  2118 02/20/24  0910 02/20/24  1713   POCTGLUCOSE 171* 186* 214*     Current correctional scale  Low  Maintain anti-hyperglycemic dose as follows-   Antihyperglycemics (From admission, onward)      Start     Stop Route Frequency Ordered    02/15/24 1054  insulin aspart U-100 pen 0-10 Units         -- SubQ  Before meals & nightly PRN 02/15/24 1055          Hold Oral hypoglycemics while patient is in the hospital.      VTE Risk Mitigation (From admission, onward)           Ordered     heparin (porcine) injection 5,000 Units  Every 8 hours         02/12/24 0416     IP VTE HIGH RISK PATIENT  Once         02/12/24 0416     Place sequential compression device  Until discontinued         02/12/24 0416     heparin (porcine) injection 1,000 Units  As needed (PRN)         02/12/24 0229                    Discharge Planning   CAMPBELL: 2/22/2024     Code Status: Full Code   Is the patient medically ready for discharge?:     Reason for patient still in hospital (select all that apply): Patient trending condition and Consult recommendations  Discharge Plan A: Skilled Nursing Facility   Discharge Delays: None known at this time              Ewa Meyer MD  Department of Hospital Medicine   [facility] - Telemetry Stepdown

## 2024-02-21 NOTE — ASSESSMENT & PLAN NOTE
"Patient is identified as having Systolic (HFrEF) heart failure that is Acute. CHF is currently uncontrolled due to Rales/crackles on pulmonary exam and Pulmonary edema/pleural effusion on CXR. Latest ECHO performed and demonstrates- Results for orders placed during the hospital encounter of 02/12/24    Echo    Interpretation Summary    Left Ventricle: The left ventricle is normal in size. Normal wall thickness. Normal wall motion. There is severely reduced systolic function with a visually estimated ejection fraction of 20 - 25%. There is diastolic dysfunction.    Right Ventricle: Normal right ventricular cavity size. Wall thickness is normal. Right ventricle wall motion has segmental dysfunction with akinesis of the mid to distal free wall. Systolic function is moderately reduced.    Left Atrium: Left atrium is moderately dilated.    Aortic Valve: Aortic valve thickening. There is moderate aortic valve sclerosis. There is annular calcification present.    IVC/SVC: Normal venous pressure at 3 mmHg.    Pericardium: Left pleural effusion.  monitor clinical status closely. Monitor on telemetry. Patient is on CHF pathway.  Monitor strict Is&Os and daily weights.  Place on fluid restriction of 1 L. Cardiology has been consulted. Continue to stress to patient importance of self efficacy and  on diet for CHF. Last BNP reviewed- and noted below No results for input(s): "BNP", "BNPTRIAGEBLO" in the last 168 hours.  - will clarify if patient tolerated ARB BB as previously recommended by cardiology,she came out of ICU with those meds held- ICU reports some hypotension so meds held, will monitor BP and add GDMT as tolerated  - Interventional cardiology consulted for [facility]- 2/21 +/- PCI  "

## 2024-02-21 NOTE — ASSESSMENT & PLAN NOTE
Patient's anemia is currently controlled. Has not received any PRBCs to date. Etiology likely d/t chronic disease due to Chronic Kidney Disease/ESRD  Current CBC reviewed-   Lab Results   Component Value Date    HGB 8.4 (L) 02/20/2024    HCT 26.0 (L) 02/20/2024     Monitor serial CBC and transfuse if patient becomes hemodynamically unstable, symptomatic or H/H drops below 7/21.

## 2024-02-22 ENCOUNTER — TELEPHONE (OUTPATIENT)
Dept: TRANSPLANT | Facility: CLINIC | Age: 74
End: 2024-02-22
Payer: MEDICARE

## 2024-02-22 DIAGNOSIS — I50.9 CONGESTIVE HEART FAILURE, UNSPECIFIED HF CHRONICITY, UNSPECIFIED HEART FAILURE TYPE: Primary | ICD-10-CM

## 2024-02-22 PROBLEM — K25.9 GASTRIC ULCER: Status: ACTIVE | Noted: 2024-01-24

## 2024-02-22 PROBLEM — Z51.5 PALLIATIVE CARE ENCOUNTER: Status: RESOLVED | Noted: 2019-10-17 | Resolved: 2024-02-22

## 2024-02-22 PROBLEM — Z71.89 ADVANCED CARE PLANNING/COUNSELING DISCUSSION: Status: RESOLVED | Noted: 2024-02-15 | Resolved: 2024-02-22

## 2024-02-22 PROBLEM — E83.9 CHRONIC KIDNEY DISEASE-MINERAL AND BONE DISORDER: Chronic | Status: ACTIVE | Noted: 2024-01-30

## 2024-02-22 PROBLEM — N18.9 CHRONIC KIDNEY DISEASE-MINERAL AND BONE DISORDER: Chronic | Status: ACTIVE | Noted: 2024-01-30

## 2024-02-22 PROBLEM — R06.02 SHORTNESS OF BREATH: Status: RESOLVED | Noted: 2024-02-15 | Resolved: 2024-02-22

## 2024-02-22 PROBLEM — M89.9 CHRONIC KIDNEY DISEASE-MINERAL AND BONE DISORDER: Chronic | Status: ACTIVE | Noted: 2024-01-30

## 2024-02-22 PROBLEM — N25.81 SECONDARY HYPERPARATHYROIDISM: Chronic | Status: ACTIVE | Noted: 2024-02-12

## 2024-02-22 PROBLEM — Z71.89 GOALS OF CARE, COUNSELING/DISCUSSION: Status: RESOLVED | Noted: 2024-02-15 | Resolved: 2024-02-22

## 2024-02-22 LAB
ALBUMIN SERPL BCP-MCNC: 2.1 G/DL (ref 3.5–5.2)
ALP SERPL-CCNC: 93 U/L (ref 55–135)
ALT SERPL W/O P-5'-P-CCNC: 27 U/L (ref 10–44)
ANION GAP SERPL CALC-SCNC: 10 MMOL/L (ref 8–16)
AST SERPL-CCNC: 20 U/L (ref 10–40)
BASOPHILS # BLD AUTO: 0.04 K/UL (ref 0–0.2)
BASOPHILS NFR BLD: 0.3 % (ref 0–1.9)
BILIRUB SERPL-MCNC: 0.3 MG/DL (ref 0.1–1)
BUN SERPL-MCNC: 19 MG/DL (ref 8–23)
CALCIUM SERPL-MCNC: 9 MG/DL (ref 8.7–10.5)
CHLORIDE SERPL-SCNC: 99 MMOL/L (ref 95–110)
CO2 SERPL-SCNC: 26 MMOL/L (ref 23–29)
CREAT SERPL-MCNC: 1.7 MG/DL (ref 0.5–1.4)
DIFFERENTIAL METHOD BLD: ABNORMAL
EOSINOPHIL # BLD AUTO: 0 K/UL (ref 0–0.5)
EOSINOPHIL NFR BLD: 0 % (ref 0–8)
ERYTHROCYTE [DISTWIDTH] IN BLOOD BY AUTOMATED COUNT: 23 % (ref 11.5–14.5)
EST. GFR  (NO RACE VARIABLE): 31.5 ML/MIN/1.73 M^2
GLUCOSE SERPL-MCNC: 146 MG/DL (ref 70–110)
HCT VFR BLD AUTO: 28 % (ref 37–48.5)
HGB BLD-MCNC: 8.8 G/DL (ref 12–16)
IMM GRANULOCYTES # BLD AUTO: 0.32 K/UL (ref 0–0.04)
IMM GRANULOCYTES NFR BLD AUTO: 2.5 % (ref 0–0.5)
LYMPHOCYTES # BLD AUTO: 0.5 K/UL (ref 1–4.8)
LYMPHOCYTES NFR BLD: 4.2 % (ref 18–48)
MAGNESIUM SERPL-MCNC: 1.9 MG/DL (ref 1.6–2.6)
MCH RBC QN AUTO: 30.3 PG (ref 27–31)
MCHC RBC AUTO-ENTMCNC: 31.4 G/DL (ref 32–36)
MCV RBC AUTO: 97 FL (ref 82–98)
MONOCYTES # BLD AUTO: 1.3 K/UL (ref 0.3–1)
MONOCYTES NFR BLD: 10.4 % (ref 4–15)
NEUTROPHILS # BLD AUTO: 10.7 K/UL (ref 1.8–7.7)
NEUTROPHILS NFR BLD: 82.6 % (ref 38–73)
NRBC BLD-RTO: 0 /100 WBC
PHOSPHATE SERPL-MCNC: 2.5 MG/DL (ref 2.7–4.5)
PLATELET # BLD AUTO: 260 K/UL (ref 150–450)
PMV BLD AUTO: 11.7 FL (ref 9.2–12.9)
POCT GLUCOSE: 149 MG/DL (ref 70–110)
POCT GLUCOSE: 149 MG/DL (ref 70–110)
POCT GLUCOSE: 163 MG/DL (ref 70–110)
POCT GLUCOSE: 208 MG/DL (ref 70–110)
POCT GLUCOSE: 281 MG/DL (ref 70–110)
POTASSIUM SERPL-SCNC: 3.8 MMOL/L (ref 3.5–5.1)
PROT SERPL-MCNC: 6.5 G/DL (ref 6–8.4)
RBC # BLD AUTO: 2.9 M/UL (ref 4–5.4)
SODIUM SERPL-SCNC: 135 MMOL/L (ref 136–145)
WBC # BLD AUTO: 12.92 K/UL (ref 3.9–12.7)

## 2024-02-22 PROCEDURE — 63600175 PHARM REV CODE 636 W HCPCS

## 2024-02-22 PROCEDURE — 36415 COLL VENOUS BLD VENIPUNCTURE: CPT | Performed by: INTERNAL MEDICINE

## 2024-02-22 PROCEDURE — 27000221 HC OXYGEN, UP TO 24 HOURS

## 2024-02-22 PROCEDURE — 25000003 PHARM REV CODE 250: Performed by: STUDENT IN AN ORGANIZED HEALTH CARE EDUCATION/TRAINING PROGRAM

## 2024-02-22 PROCEDURE — 25000003 PHARM REV CODE 250: Performed by: INTERNAL MEDICINE

## 2024-02-22 PROCEDURE — 94761 N-INVAS EAR/PLS OXIMETRY MLT: CPT

## 2024-02-22 PROCEDURE — 99900035 HC TECH TIME PER 15 MIN (STAT)

## 2024-02-22 PROCEDURE — 85025 COMPLETE CBC W/AUTO DIFF WBC: CPT | Performed by: INTERNAL MEDICINE

## 2024-02-22 PROCEDURE — 97530 THERAPEUTIC ACTIVITIES: CPT

## 2024-02-22 PROCEDURE — 84100 ASSAY OF PHOSPHORUS: CPT | Performed by: INTERNAL MEDICINE

## 2024-02-22 PROCEDURE — 97110 THERAPEUTIC EXERCISES: CPT

## 2024-02-22 PROCEDURE — 97535 SELF CARE MNGMENT TRAINING: CPT

## 2024-02-22 PROCEDURE — 20600001 HC STEP DOWN PRIVATE ROOM

## 2024-02-22 PROCEDURE — 80053 COMPREHEN METABOLIC PANEL: CPT | Performed by: INTERNAL MEDICINE

## 2024-02-22 PROCEDURE — 25000003 PHARM REV CODE 250

## 2024-02-22 PROCEDURE — 94799 UNLISTED PULMONARY SVC/PX: CPT | Mod: XB

## 2024-02-22 PROCEDURE — 94664 DEMO&/EVAL PT USE INHALER: CPT

## 2024-02-22 PROCEDURE — 83735 ASSAY OF MAGNESIUM: CPT | Performed by: INTERNAL MEDICINE

## 2024-02-22 RX ORDER — AMIODARONE HYDROCHLORIDE 200 MG/1
TABLET ORAL
Qty: 60 TABLET | Refills: 11 | Status: SHIPPED | OUTPATIENT
Start: 2024-02-22 | End: 2024-02-28

## 2024-02-22 RX ORDER — ATORVASTATIN CALCIUM 40 MG/1
40 TABLET, FILM COATED ORAL NIGHTLY
Start: 2024-02-22 | End: 2024-04-16 | Stop reason: SDUPTHER

## 2024-02-22 RX ORDER — NIFEDIPINE 30 MG/1
30 TABLET, EXTENDED RELEASE ORAL DAILY
Qty: 30 TABLET | Refills: 11 | Status: SHIPPED | OUTPATIENT
Start: 2024-02-23 | End: 2024-02-28 | Stop reason: HOSPADM

## 2024-02-22 RX ORDER — ASPIRIN 81 MG/1
81 TABLET ORAL DAILY
Refills: 0
Start: 2024-02-22 | End: 2024-04-26 | Stop reason: SDUPTHER

## 2024-02-22 RX ORDER — PREGABALIN 75 MG/1
75 CAPSULE ORAL EVERY OTHER DAY
Start: 2024-02-22 | End: 2024-04-16 | Stop reason: SDUPTHER

## 2024-02-22 RX ORDER — ASPIRIN 81 MG/1
81 TABLET ORAL DAILY
Status: DISCONTINUED | OUTPATIENT
Start: 2024-02-23 | End: 2024-02-26

## 2024-02-22 RX ADMIN — Medication 324 MG: at 08:02

## 2024-02-22 RX ADMIN — INSULIN ASPART 4 UNITS: 100 INJECTION, SOLUTION INTRAVENOUS; SUBCUTANEOUS at 05:02

## 2024-02-22 RX ADMIN — DRONABINOL 5 MG: 2.5 CAPSULE ORAL at 05:02

## 2024-02-22 RX ADMIN — PANTOPRAZOLE SODIUM 40 MG: 40 TABLET, DELAYED RELEASE ORAL at 06:02

## 2024-02-22 RX ADMIN — PREGABALIN 75 MG: 75 CAPSULE ORAL at 09:02

## 2024-02-22 RX ADMIN — HEPARIN SODIUM 5000 UNITS: 5000 INJECTION INTRAVENOUS; SUBCUTANEOUS at 05:02

## 2024-02-22 RX ADMIN — AMIODARONE HYDROCHLORIDE 400 MG: 200 TABLET ORAL at 08:02

## 2024-02-22 RX ADMIN — DRONABINOL 5 MG: 2.5 CAPSULE ORAL at 06:02

## 2024-02-22 RX ADMIN — HEPARIN SODIUM 5000 UNITS: 5000 INJECTION INTRAVENOUS; SUBCUTANEOUS at 09:02

## 2024-02-22 RX ADMIN — NIFEDIPINE 30 MG: 30 TABLET, FILM COATED, EXTENDED RELEASE ORAL at 08:02

## 2024-02-22 RX ADMIN — HEPARIN SODIUM 5000 UNITS: 5000 INJECTION INTRAVENOUS; SUBCUTANEOUS at 06:02

## 2024-02-22 RX ADMIN — ATORVASTATIN CALCIUM 40 MG: 40 TABLET, FILM COATED ORAL at 09:02

## 2024-02-22 RX ADMIN — INSULIN ASPART 6 UNITS: 100 INJECTION, SOLUTION INTRAVENOUS; SUBCUTANEOUS at 08:02

## 2024-02-22 RX ADMIN — PANTOPRAZOLE SODIUM 40 MG: 40 TABLET, DELAYED RELEASE ORAL at 05:02

## 2024-02-22 RX ADMIN — AMIODARONE HYDROCHLORIDE 400 MG: 200 TABLET ORAL at 09:02

## 2024-02-22 RX ADMIN — VENLAFAXINE 37.5 MG: 37.5 TABLET ORAL at 08:02

## 2024-02-22 NOTE — PLAN OF CARE
[facility]     Department of Hospital Medicine     [address] [place] [address]     [phone] after hours  [phone] fax       NURSING HOME ORDERS    Patient Name: Lily Green  YOB: [birth year]    Admit to Nursing Home:  Skilled Bed                                                 Diagnoses:  Active Hospital Problems    Diagnosis  POA    Left lower lobe pneumonia [J18.9]  No    Moderate malnutrition [E44.0]  Yes    Pericardial effusion [I31.39]  No    Secondary hyperparathyroidism [N25.81]  Yes     Chronic    Acute decompensated heart failure [I50.9]  Yes    Atrial flutter [I48.92]  Yes    Chronic kidney disease-mineral and bone disorder [N18.9, E83.9, M89.9]  Yes     Chronic    Gastric ulcer [K25.9]  Yes    Pleural effusion on left [J90]  Yes    Atrial fibrillation [I48.91]  Yes     Chronic    ESRD (end stage renal disease) [N18.6]  Yes     Chronic    Anemia in ESRD (end-stage renal disease) [N18.6, D63.1]  Yes     Chronic    Generalized anxiety disorder [F41.1]  Yes     Chronic    Type 2 diabetes mellitus with diabetic polyneuropathy, without long-term current use of insulin [E11.42]  Yes     Chronic    Hyperlipidemia, mixed [E78.2]  Yes     Chronic      Resolved Hospital Problems    Diagnosis Date Resolved POA    *Acute hypoxemic respiratory failure [J96.01] 02/29/2024 Yes    Hypernatremia [E87.0] 02/21/2024 Unknown    Shortness of breath [R06.02] 02/22/2024 Yes    Goals of care, counseling/discussion [Z71.89] 02/22/2024 Not Applicable    Advanced care planning/counseling discussion [Z71.89] 02/22/2024 Not Applicable    Palliative care encounter [Z51.5] 02/22/2024 Not Applicable     Allergies:  Review of patient's allergies indicates:   Allergen Reactions    Crestor [rosuvastatin] Swelling    Gluten protein        Vitals:      Routine    Diet: renal diet       Activities: as tolerated    LABS:  Per facility  protocol    Nursing Precautions:        - Fall precautions per nursing home protocol    CONSULTS:     Physical Therapy to evaluate and treat     Occupational Therapy to evaluate and treat  MISCELLANEOUS CARE:   Wound Care: Sacrum/ buttocks- Triad barrier Q shift.    Oxygen: 1 liter/minute nasal cannula as needed; wean as tolerated      Medications:   Reconciled Home Medications:      Medication List        START taking these medications      amiodarone 200 MG Tab  Commonly known as: PACERONE  Take 1 tablet (200 mg total) by mouth 2 (two) times daily.     aspirin 81 MG EC tablet  Commonly known as: ECOTRIN  Take 1 tablet (81 mg total) by mouth once daily.     doxycycline 100 MG tablet  Commonly known as: VIBRA-TABS  Take 1 tablet (100 mg total) by mouth every 12 (twelve) hours. End date 3/7/24 for 6 days     valsartan 40 MG tablet  Commonly known as: DIOVAN  Take 0.5 tablets (20 mg total) by mouth 2 (two) times daily.            CHANGE how you take these medications      cefpodoxime 200 MG tablet  Commonly known as: VANTIN  Take 1 tablet (200 mg total) by mouth once daily. End date 3/7/2024 for 6 days  What changed:   when to take this  additional instructions            CONTINUE taking these medications      atorvastatin 40 MG tablet  Commonly known as: LIPITOR  Take 1 tablet (40 mg total) by mouth every evening.     BETAHISTINE (BULK) MISC     blood sugar diagnostic Strp  Commonly known as: TRUE METRIX GLUCOSE TEST STRIP  USE ONE STRIP FOR TESTING 2 TIMES A DAY     blood-glucose meter kit  Use to test twice a day     calcium-vitamin D3 500 mg-5 mcg (200 unit) per tablet  Commonly known as: OS-CIPRIANO 500 + D3  Take 1 tablet by mouth once daily.     ferrous gluconate 324 MG tablet  Commonly known as: FERGON  Take 1 tablet (324 mg total) by mouth daily with breakfast.     insulin aspart U-100 100 unit/mL (3 mL) Inpn pen  Commonly known as: NovoLOG  Inject before meals:  150-200=+1, 201-250=+2, 251-300=+3; 301-350=+4,  "over 350=+5 units     lancets Misc  1 lancet by Misc.(Non-Drug; Combo Route) route 4 (four) times daily.     meclizine 25 mg tablet  Commonly known as: ANTIVERT  Take 1 tablet (25 mg total) by mouth 3 (three) times daily as needed for Dizziness.     metoprolol tartrate 25 MG tablet  Commonly known as: LOPRESSOR  Take 0.5 tablets (12.5 mg total) by mouth 2 (two) times daily.     midodrine 10 MG tablet  Commonly known as: PROAMATINE  Take 1 tablet (10 mg total) by mouth 2 (two) times daily with meals.     nutritional supplements Liqd  Take 237 mLs by mouth 4 (four) times daily.     pantoprazole 40 MG tablet  Commonly known as: PROTONIX  Take 1 tablet (40 mg total) by mouth 2 (two) times daily before meals.     patiromer calcium sorbitex 8.4 gram Pwpk  Commonly known as: VELTASSA  Take 2 packets (16.8 g total) by mouth once daily.     pen needle, diabetic 32 gauge x 5/32" Ndle  Commonly known as: BD ULTRA-FINE MARIS PEN NEEDLE  1 Device by Misc.(Non-Drug; Combo Route) route 4 (four) times daily with meals and nightly.     pregabalin 75 MG capsule  Commonly known as: LYRICA  Take 1 capsule (75 mg total) by mouth every other day. Give after HD     MELLISSA-BENJY 0.8 mg Tab  Generic drug: B complex-vitamin C-folic acid  Take 1 tablet (0.8 mg total) by mouth once daily.     sevelamer  MG Tab  Commonly known as: RENAGEL  Take 2 tablets (1,600 mg total) by mouth 3 (three) times daily with meals.     torsemide 20 MG Tab  Commonly known as: DEMADEX  Take 20 mg by mouth 2 (two) times daily.     venlafaxine 37.5 MG Tab  Commonly known as: EFFEXOR  Take 1 tablet (37.5 mg total) by mouth once daily.     vit C,M-Tb-ubjck-lutein-zeaxan 250-90-40-1 mg Cap  Commonly known as: PRESERVISION AREDS 2            STOP taking these medications      metroNIDAZOLE 500 MG tablet  Commonly known as: FLAGYL             Follow-up Information       [id number] Follow up.    Specialty: Transplant  Contact information:  [address]  [facility] [address] [phone]  Additional information:  [facility] - Main Building, Atrium 3rd Floor   Please park in [facility] and use Atrium elevator                                   _________________________________  Patrick Guardado MD  03/01/2024

## 2024-02-22 NOTE — SUBJECTIVE & OBJECTIVE
Interval History: Awaiting acceptance back to [facility].    Review of Systems   Constitutional:  Negative for chills and fever.   Respiratory:  Negative for cough and shortness of breath.    Gastrointestinal:  Negative for blood in stool.   Neurological:  Negative for syncope and headaches.     Objective:     Vital Signs (Most Recent):  Temp: 98 °F (36.7 °C) (02/22/24 0749)  Pulse: 85 (02/22/24 0749)  Resp: 18 (02/22/24 0749)  BP: 133/78 (02/22/24 0749)  SpO2: (!) 92 % (02/22/24 0749) Vital Signs (24h Range):  Temp:  [96.4 °F (35.8 °C)-98.6 °F (37 °C)] 98 °F (36.7 °C)  Pulse:  [] 85  Resp:  [16-21] 18  SpO2:  [92 %-100 %] 92 %  BP: (102-145)/(54-78) 133/78     Weight: 67.6 kg (149 lb 0.5 oz)  Body mass index is 24.8 kg/m².    Intake/Output Summary (Last 24 hours) at 2/22/2024 0916  Last data filed at 2/22/2024 0100  Gross per 24 hour   Intake 600 ml   Output 3600 ml   Net -3000 ml         Physical Exam  Vitals and nursing note reviewed.   Constitutional:       General: She is not in acute distress.     Appearance: She is not diaphoretic.      Interventions: Nasal cannula in place.   Pulmonary:      Effort: Pulmonary effort is normal. No respiratory distress.   Skin:     General: Skin is warm and dry.      Coloration: Skin is not jaundiced or pale.   Neurological:      Mental Status: She is alert and oriented to person, place, and time. Mental status is at baseline.      Motor: No seizure activity.   Psychiatric:         Attention and Perception: Attention normal.         Behavior: Behavior normal. Behavior is not aggressive or combative.             Significant Labs: All pertinent labs within the past 24 hours have been reviewed.    Significant Imaging: I have reviewed all pertinent imaging results/findings within the past 24 hours.  X-Ray Chest 1 View 2/12/24: FINDINGS:  There has been removal of the right-sided chest tube.  There is no pneumothorax.  There is a dual lumen central venous catheter, unchanged in  position from prior.  There is a partially loculated small right pleural effusion and a layering small left pleural effusion.  There are stable bilateral patchy airspace opacities and hazy opacities in the bilateral lung bases.  There is no pneumothorax.  The cardiac silhouette is enlarged.  There is a loop recorder device.  There is a left subclavian vascular stent.  There are remote right-sided rib fractures.  Osseous structures otherwise demonstrate degenerative changes.  Impression:  Interval removal of the right chest tube.  No pneumothorax.  Stable pulmonary edema or multifocal pneumonia.  Small bilateral pleural effusions.  Transthoracic echo complete with contrast 2/12/24:     Left Ventricle: The left ventricle is normal in size. Normal wall thickness. Regional wall motion abnormalities present. See diagram for wall motion findings. There is severely reduced systolic function with a visually estimated ejection fraction of 15 - 20%. Ejection fraction by visual approximation is 15%. Unable to assess diastolic function due to atrial fibrillation.    Right Ventricle: Normal right ventricular cavity size. Wall thickness is normal. Right ventricle wall motion  is normal. Systolic function is normal.    Left Atrium: Left atrium is severely dilated.    Right Atrium: Right atrium is mildly dilated.    Aortic Valve: There is moderate aortic valve sclerosis. Mildly restricted motion.    Mitral Valve: There is moderate to severe regurgitation.    Tricuspid Valve: There is mild regurgitation.    IVC/SVC: Intermediate venous pressure at 8 mmHg.    Pericardium: There is a trivial effusion. Left pleural effusion.  CTA Chest Non-Coronary (PE Studies) 2/12/24: FINDINGS:  Exam quality: Satisfactory.  Pulmonary embolism: No acute or chronic pulmonary embolism.  Central pulmonary arteries: Borderline enlargement of the main pulmonary artery to 31 mm transverse dimension, nonspecific finding which may be seen the setting of  pulmonary arterial hypertension.  Aorta: Nonenlarged.  Left-sided arch with conventional 3 vessel arch anatomy.  Moderate atherosclerosis.  Heart: Appears at least mildly enlarged.  Coronary arteries: At least mild atherosclerotic calcifications.  Pericardium: Pericardial effusion measuring up to at least 12 mm in thickness, as measured ventral to the right ventricle.  Support tubes and lines: Tunneled dual lumen right internal jugular approach central venous catheter appears to terminate within the right atrium.  Presence of loop recorder device is suggested.  Base of neck/thyroid: Diminutive caliber thyroid gland as before.  Left brachiocephalic/subclavian/proximal axillary venous stent again identified.  Question SVC stenosis, similar configuration to the 01/13/2024 CT.  Lymph nodes: Relatively similar thoracic adenopathy compared to 01/13/2024.  Reference stable 18 mm short axis subcarinal node (axial series 2, image 197).  Esophagus: Esophagus appears patulous with some degree of retained material, findings which may be seen in the setting of dysmotility the appropriate clinical context.  There remains soft tissue fullness in the lower right paraesophageal soft tissues (for example as seen on axial series 2, image 227), measures on the order of 24 x 30 mm transaxial dimension, relatively similar to slightly decreased in fullness when compared to the 01/13/2024 chest CT.  Persistent inflammatory change related to previous identified as azygoesophageal recess gas containing collection identified on the 01/08/2024 abdomen and pelvis CT to be considered.  Attention on follow-up would be recommended.  Pleura: Moderate to large loculated pleural effusions bilaterally.  Upper abdomen: Unremarkable  Body wall: Suggested at least mild diffuse body wall edema.  Airways: Central airways appear grossly patent.  Nonspecific bronchial wall thickening most pronounced dependently in the lower lobes.  Lungs: Motion compromised  assessment.  Atelectasis.  Postop sequela of the right middle lobe, as before.  No new masslike soft tissue is appreciated the current study, allowing for limitations imposed by motion.  Bones: No definite acute abnormality.  Degenerative findings of the spine.  Impression:  1. No convincing evidence of acute pulmonary embolism.  2. Bilateral pleural effusions, pericardial effusion, diffuse body wall edema, and suggested pulmonary vascular congestion/interstitial edema.  Correlation with patient volume status recommended.  3. There remains soft tissue fullness in the lower right paraesophageal soft tissues measures on the order of 24 x 30 mm transaxial dimension, relatively similar to slightly decreased in fullness when compared to the 01/13/2024 chest CT. Persistent inflammatory change related to previous identified as azygoesophageal recess gas containing collection identified on the 01/08/2024 abdomen and pelvis CT to be considered. Attention on follow-up would be recommended.  4. Additional details, as provided in the body of the report.  X-Ray Chest 1 View 2/14/24: FINDINGS:  Cardiac size is stable.  Vascular catheter seen in the superior vena cava.  Large amount of pleural fluid is seen on the left and some pleural fluid or thickening is present on the right.  Patchy airspace consolidation and edema can be seen in the lungs and is little worse than was 3 days ago.  Transthoracic echo 2D only 2/14/24:     Left Ventricle: The left ventricle is normal in size. Normal wall thickness. Normal wall motion. There is severely reduced systolic function with a visually estimated ejection fraction of 20 - 25%. There is diastolic dysfunction.    Right Ventricle: Normal right ventricular cavity size. Wall thickness is normal. Right ventricle wall motion has segmental dysfunction with akinesis of the mid to distal free wall. Systolic function is moderately reduced.    Left Atrium: Left atrium is moderately dilated.    Aortic  Valve: Aortic valve thickening. There is moderate aortic valve sclerosis. There is annular calcification present.    IVC/SVC: Normal venous pressure at 3 mmHg.    Pericardium: Left pleural effusion.  X-Ray Chest 1 View 2/15/24:   FINDINGS:  There is been interval left thoracentesis, noting essential resolution of the left pleural effusion.  There is no pneumothorax or other significant detrimental change in the cardiopulmonary status since the previous exam.  X-Ray Chest AP Portable 2/15/24: FINDINGS:  Right central venous catheter tip projects over the distal SVC.  Since the previous exam, there is some accumulation of left pleural effusion, right effusion appears stable.  Coarse interstitial attenuation bilaterally suggests edema, the process appears to have worsened throughout the left hemithorax.  There is no pneumothorax or other significant detrimental change since the previous exam.  X-Ray Chest 1 View 2/17/24: FINDINGS:  Cardiomediastinal silhouetteis stable.  Right jugular tunnel dialysis catheter remains in place.  Surgical clips overlie the right upper chest.  Left subclavian and innominate venous wall stents redemonstrated.  Perihilar interstitial and alveolar edema associated with bibasilar consolidation  From pleural fluid and atelectasis and or pneumonia does not appear significantly changed.  There is interval resolution of ovoid opacity in the right mid lung that likely represented some pleural fluid trapped in the oblique fissure.  Impression:  Stable volume overload and pulmonary edema  X-Ray Chest 1 View 2/19/24: FINDINGS:  Reconfirmed large-bore right jugular catheter, tip as before superimposing cavoatrial soft tissues.  Stable enlargement of cardiopericardial silhouette, arch calcification, pulmonary vascular congestion, bilateral interstitial opacities, likely edema and more confluent left lower lobe-retrocardiac opacity leading to obscured left hemidiaphragm that could relate to loculated  fluid and or atelectasis and or infiltrate.  Reconfirmed trace bilateral effusions.  No right or left pneumothorax.  Stable left subclavian and innominate venous wall stents.  EKG leads superimposed chest and abdomen.  Stable clips project about the right hemithorax.  Suture anchor seen in the right the humeral head.  Impression:  Allowing for differences in patient positioning, no significant interval changes.  Left heart catheterization 2/21/24:  Left Main   The vessel is angiographically normal.      Left Anterior Descending   Exhibits minimal luminal irregularities.      First Diagonal Branch   The vessel is moderate in size. The vessel exhibits minimal luminal irregularities.      Second Diagonal Branch   The vessel is small. The vessel exhibits minimal luminal irregularities.      Third Diagonal Branch   The vessel is small. The vessel exhibits minimal luminal irregularities.      Left Circumflex   The vessel is angiographically normal.      First Obtuse Marginal Branch   The vessel exhibits minimal luminal irregularities.      Second Obtuse Marginal Branch   The vessel is angiographically normal.      Third Obtuse Marginal Branch   The vessel is angiographically normal.      Left Posterior Descending Artery   The vessel is angiographically normal.      First Left Posterolateral Branch   The vessel is angiographically normal.      Second Left Posterolateral Branch   The vessel is angiographically normal.      Right Coronary Artery   The vessel is angiographically normal.

## 2024-02-22 NOTE — PLAN OF CARE
[facility] SNF [facility] and [facility] clinicals [facility] and left voicemail notifying Gerri ([phone]) in admissions.    Await SNF insurance auth.    12:12 PM  Spoke with Gerri in admissions at [facility] who reports they submitted for SNF auth yesterday and have already obtained it today. SW voided current pending request. Gerri reports she is reviewing the orders and will notify SW of report information shortly.    1:13 PM  Per Gerri, they are ready for patient today. Updated RN and MD.    2:15 PM  Met with patient and family at bedside, patient and family are frustrated as they do not feel patient is medically stable and want to speak with attending MD and cardiology. MD notified and postponed DC, patient is on 4L O2.    3:03 PM  Notified Gerri at [facility] SNF of above.    Jeovanny Dow - Telemetry Stepdown  Discharge Reassessment    Primary Care Provider: Pavel Calixto MD    Expected Discharge Date: 2/26/2024    Reassessment (most recent)       Discharge Reassessment - 02/22/24 1503          Discharge Reassessment    Assessment Type Discharge Planning Reassessment     Did the patient's condition or plan change since previous assessment? No     Discharge Plan discussed with: Spouse/sig other;Patient;Sibling     Discharge Plan A Skilled Nursing Facility     Discharge Plan B Home with family;Home Health     DME Needed Upon Discharge  none     Transition of Care Barriers None     Why the patient remains in the hospital Requires continued medical care        Post-Acute Status    Post-Acute Authorization Placement     Post-Acute Placement Status Pending medical clearance/testing   [facility] SNF- has insurance auth    Discharge Delays None known at this time                 Discharge Plan A and Plan B have been determined by review of patient's clinical status, future medical and therapeutic needs, and coverage/benefits for post-acute care in coordination with  multidisciplinary team members.    GOOD BeanW [facility]  Ext. [phone]

## 2024-02-22 NOTE — PT/OT/SLP PROGRESS
Occupational Therapy   Treatment    Name: Lily Green  MRN: [id number]  Admitting Diagnosis:  Acute hypoxemic respiratory failure  1 Day Post-Op    Recommendations:     Discharge Recommendations: Moderate Intensity Therapy  Discharge Equipment Recommendations:  wheelchair  Barriers to discharge:   (increased (A) required)    Assessment:     Lily Green is a 73 y.o. female with a medical diagnosis of Acute hypoxemic respiratory failure.  She presents with fair participation and motivation.  Pt continues to require (A) with activities & fatigues easily. Pt continues to be at risk for falls. Performance deficits affecting function are weakness, impaired endurance, impaired self care skills, impaired functional mobility, impaired balance, decreased upper extremity function, decreased lower extremity function, decreased safety awareness, impaired cardiopulmonary response to activity.     Rehab Prognosis:  Fair; patient would benefit from acute skilled OT services to address these deficits and reach maximum level of function.       Plan:     Patient to be seen 4 x/week to address the above listed problems via self-care/home management, therapeutic activities, therapeutic exercises, neuromuscular re-education  Plan of Care Expires: 02/29/24  Plan of Care Reviewed with: patient, spouse    Subjective     Chief Complaint: fatigue  Patient/Family Comments/goals: Pt & spouse reported that she wants to have therapy daily if able even if that means she only has 1 discipline of therapy a day (staggering therapy).  Pain/Comfort:  Pain Rating 1: 0/10  Pain Rating Post-Intervention 1: 0/10    Objective:     Communicated with: RN prior to session.  Patient found up in chair with telemetry, pulse ox (continuous), oxygen (spouse present during part of session) upon OT entry to room.    General Precautions: Standard, fall, aspiration, NPO, contact, sternal    Orthopedic Precautions:N/A  Braces: N/A     Occupational  Performance:       Activities of Daily Living:  Grooming: stand by assistance with grooming while seated in chair  Lower Body Dressing: total assistance donning socks seated in chair      [facility] 6 Click ADL: 13    Treatment & Education:  Provided education regarding figure 4 positioning of LE for LE dressing.  Provided LE dressing therex x 5 reps for BLE while seated in chair to facilitate strength for figure 4 positioning.  Pt performed AROM exercises with BUE in 3 planes x 10 reps each while seated in chair.  Provided education on endurance & strength training & ways to address both via increasing participation in ADL's as able & as safe to perform.  Pt & pt's spouse reported wanting daily therapy as much as possible even if it means staggered therapy with 1 discipline per day as opposed to 2 sessions daily where able.  Pt had no further questions & when asked whether there were any concerns pt reported none.      Patient left up in chair with all lines intact, call button in reach, RN notified, and spouse present    GOALS:   Multidisciplinary Problems       Occupational Therapy Goals          Problem: Occupational Therapy    Goal Priority Disciplines Outcome Interventions   Occupational Therapy Goal     OT, PT/OT Ongoing, Progressing    Description: Goals to be met by: 03-04-24     Patient will increase functional independence with ADLs by performing:    Feeding with Supervision.  UE Dressing with Minimal Assistance.  LE Dressing with Moderate Assistance.  Grooming while seated with Supervision.  Toileting from bedside commode with Moderate Assistance for hygiene and clothing management.   Supine to sit with Stand-by Assistance.  Stand pivot transfers with Contact Guard Assistance.  Step transfer with Contact Guard Assistance  Toilet transfer to bedside commode with Contact Guard Assistance.  Pt. To be Independent with hEP to BUE to improve level of endurance                         Time Tracking:     OT Date of  Treatment: 02/22/24  OT Start Time: 0835  OT Stop Time: 0916  OT Total Time (min): 41 min    Billable Minutes:Self Care/Home Management 15  Therapeutic Activity 10  Therapeutic Exercise 16    OT/RICHARD: OT     Number of RICHARD visits since last OT visit: 1    2/22/2024

## 2024-02-22 NOTE — PLAN OF CARE
Problem: Occupational Therapy  Goal: Occupational Therapy Goal  Description: Goals to be met by: 03-04-24     Patient will increase functional independence with ADLs by performing:    Feeding with Supervision.  UE Dressing with Minimal Assistance.  LE Dressing with Moderate Assistance.  Grooming while seated with Supervision.  Toileting from bedside commode with Moderate Assistance for hygiene and clothing management.   Supine to sit with Stand-by Assistance.  Stand pivot transfers with Contact Guard Assistance.  Step transfer with Contact Guard Assistance  Toilet transfer to bedside commode with Contact Guard Assistance.  Pt. To be Independent with hEP to BUE to improve level of endurance    Outcome: Ongoing, Progressing     Goals remain appropriate

## 2024-02-22 NOTE — PROGRESS NOTES
02/22/24 0100   Vital Signs   Temp 97.1 °F (36.2 °C)   Temp Source Axillary   Pulse 91   Heart Rate Source Monitor   Resp 18   SpO2 100 %   Device (Oxygen Therapy) nasal cannula   BP (!) 120/58   MAP (mmHg) 82   BP Location Left leg   BP Method Automatic   Patient Position Lying     HD tx completed and pt tolerated well.  Net UF: 3L per order.  Tx time: 3.5 hrs.  Pt is alert and stable. VSS.

## 2024-02-22 NOTE — SUBJECTIVE & OBJECTIVE
Interval History: Still on 4 liters/minute of O2. Appears short of breath and physically weak. Going to dialysis. I spoke to Cardiology about a consult.     Review of Systems   Constitutional:  Negative for chills and fever.   Respiratory:  Positive for shortness of breath. Negative for cough.    Gastrointestinal:  Negative for blood in stool.   Neurological:  Negative for syncope and headaches.     Objective:     Vital Signs (Most Recent):  Temp: 97.6 °F (36.4 °C) (02/22/24 1123)  Pulse: 88 (02/22/24 1200)  Resp: (!) 22 (02/22/24 1200)  BP: 131/81 (02/22/24 1123)  SpO2: 97 % (02/22/24 1200) Vital Signs (24h Range):  Temp:  [96.4 °F (35.8 °C)-98.6 °F (37 °C)] 97.6 °F (36.4 °C)  Pulse:  [] 88  Resp:  [16-22] 22  SpO2:  [92 %-100 %] 97 %  BP: (102-145)/(54-81) 131/81     Weight: 67.6 kg (149 lb 0.5 oz)  Body mass index is 24.8 kg/m².    Intake/Output Summary (Last 24 hours) at 2/22/2024 1401  Last data filed at 2/22/2024 0100  Gross per 24 hour   Intake 600 ml   Output 3600 ml   Net -3000 ml           Physical Exam  Vitals and nursing note reviewed.   Constitutional:       General: She is not in acute distress.     Appearance: She is not diaphoretic.      Interventions: She is not intubated.Nasal cannula in place.   Pulmonary:      Effort: Pulmonary effort is normal. Tachypnea present. No respiratory distress. She is not intubated.   Musculoskeletal:         General: No swelling or tenderness.      Right lower leg: No edema.      Left lower leg: No edema.   Skin:     General: Skin is warm and dry.      Coloration: Skin is not jaundiced.   Neurological:      Mental Status: She is alert and oriented to person, place, and time. Mental status is at baseline.      Motor: No seizure activity.   Psychiatric:         Attention and Perception: Attention normal.         Behavior: Behavior normal. Behavior is not aggressive or combative.             Significant Labs: All pertinent labs within the past 24 hours have been  reviewed.    Significant Imaging: I have reviewed all pertinent imaging results/findings within the past 24 hours.

## 2024-02-22 NOTE — NURSING TRANSFER
Nursing Transfer Note      2/21/2024   6:00 PM    Nurse giving handoff:ARABELLA Koehler  Nurse receiving handoff:ARABELLA Mitchell    Reason patient is being transferred: inpatient    Transfer To: [facility] [id number]    Transfer via stretcher    Transfer with cardiac monitoring    Transported by transporter    Transfer Vital Signs:  Blood Pressure:131/60  Heart Rate:74  O2:100  Temperature:97.9  Respirations:16      Order for Tele Monitor? Yes    Additional Lines: none      Any special needs or follow-up needed: monitor right groin site    Patient belongings transferred with patient: Yes    Chart send with patient: Yes

## 2024-02-22 NOTE — PROGRESS NOTES
02/21/24 2130   Vital Signs   Temp 96.4 °F (35.8 °C)   Temp Source Axillary   Pulse 73   Heart Rate Source Monitor   Resp 16   Device (Oxygen Therapy) nasal cannula   /63   MAP (mmHg) 90   BP Location Left leg   BP Method Automatic   Patient Position Lying     Bedside HD 1:1 tx initiated aseptically via RIJ TDC without issue.  Pt is alert and stable for tx.

## 2024-02-22 NOTE — NURSING
Report given to ARABELLA Mitchell. Pt on hourly vitals. Next set of vitals at 1830. Pt is off of bedrest at 1930.

## 2024-02-22 NOTE — DISCHARGE SUMMARY
Jeovanny Dow - Telemetry [facility] Medicine  Discharge Summary      Patient Name: Lily Green  MRN: [id number]  ANDER: [id number]  Patient Class: IP- Inpatient  Admission Date: 2/12/2024  Hospital Length of Stay: 18 days  Discharge Date and Time: 3/1/2024  4:01 PM  Attending Physician: Patrick Guardado MD   Discharging Provider: Patrick Guardado MD  Primary Care Provider: Pavel Calixto MD  [facility] Medicine Team: [facility] W Patrick Guardado MD  Primary Care Team: [facility] MED     HPI:   Lily Green is a 73 year old white woman with celiac disease, diabetes mellitus type 2 with polyneuropathy, hypertension, hyperlipidemia, history of peptic ulcer disease, depression, anxiety, history of hysterectomy in 1987, history of peritonitis due to diffuse colonic ischemia status post total colectomy and end ileostomy creation on 1/14/2023, evacuation of intraabdominal hematoma, takedown of left lateral ileostomy and recreation of right lateral end ileostomy on 1/16/2023, elective robotic ileostomy reversal and ileocolonic anastomosis on 8/7/2023, irritable bowel syndrome, end stage renal disease status post right internal jugular permanent dialysis catheter on 1/25/2023 removed on 1/28/2023 and replaced with left internal jugular permanent dialysis catheter (on hemodialysis Tuesday Thursday Saturday), right middle lobe carcinoid tumor status post robotic-assisted converted to right thoracotomy with therapeutic wedge resection and mediastinal lymph node dissection and repair of bleeding right inferior pulmonary vein on 1/2/2024, atrial fibrillation, Medtronic Micra AV leadless cardiac pacemaker placement on 1/16/2024. She lives in [place], [place]. She is . Her primary care physician is Dr. Pavel Calixto.               She was hospitalized at [facility] from 1/2/2024 to 2/6/2024 for right middle lobe carcinoid tumor, parapneumonic effusion, atrial  fibrillation with rapid ventricular response, tachy-eulogio syndrome, gastrointestinal bleeding while on heparin. She was discharged to [facility] nursing [facility]. She was prescribed cefpodoxime and metronidazole for 13 days, fluconazole for 7 days, metoprolol tartrate, and venlafaxine.               She presented to [facility] Emergency Department on 2/12/2024 from the [facility] nursing [facility] with increased work of breathing, dyspnea, and hypoxia with oxygen saturation of 70% on 2 liters/minute of supplemental oxygen. It occurred acutely on the day of presentation. Emergency medical services put her on CPAP with some improvement. She was put on BiPAP in the emergency department. She had tachycardia and tachypnea. Her last dialysis session was 2 days prior on Saturday. She reported cough and nausea. Labs showed BNP 2754 pg/mL, troponin 0.312 ng/mL. Venous blood gas showed pH 7.438, pCO2 38.9. EKG showed sinus tachycardia. Chest X-ray showed pleural effusions and possible multifocal pneumonia. Nephrology was consulted for emergent dialysis with goal of removing 3 liters. She was admitted to Critical Care Medicine.    Procedure(s) (LRB):  ANGIOGRAM, CORONARY ARTERY (N/A)  Left heart cath (Left)      Hospital Course:   Echocardiogram revealed new low left ventricular ejection fraction. Troponins remained elevated. Cardiology was consulted. She developed atrial fibrillation with rapid ventricular response on 2/13/2024. She was started on amiodarone infusion. She underwent slow low-efficiency dialysis (SLED). CT showed fluid collections in the right paraesophageal space and azygoesophageal recess. Cardiothoracic Surgery was consulted and recommended Interventional Radiology consult for drainage, but there was no safe window for approach. Interventional Cardiology deferred angiography due to respiratory failure. Palliative Care was consulted. Dialysis removed further fluid.  Metoprolol was increased. Thoracentesis was performed on 2/15/2024 removing 1.45 liters, which was sent for analysis. She developed re-expansion pulmonary edema and was started on Airvo high flow oxygen. Airvo was weaned down to 50 liters/minute and FiO2 40% on 2/16/2024. Pleural effusion was transudative. She was started on vasopressor support. She was weaned to high flow nasal cannula at 12 liters/minute on 2/17/2024. She tolerated SLED without norepinephrine. She was weaned to 4 liters/minute on 2/18/2024. She was transferred to Hospital Medicine Team W on 2/20/2024. She was started on nifedipine. She worked with Physical and Occupational Therapy. Interventional Cardiology was reconsulted. Left heart catheterization showed luminal irregularities. Interventional Cardiology recommended starting aspirin 81 mg daily and atorvastatin 10 mg daily and consulting Heart Transplant Service (HTS) for management of nonischemic cardiomyopathy. She was already on atorvastatin 40 mg daily. Palliative Care signed off and planned to see her outpatient. She continued to require 4 liters/minute of supplemental oxygen. She was still short of breath. HTS was consulted and they said General Cardiology could be consulted. Cardiology recommended stopping nifedipine, starting valsartan, and resuming her metoprolol. Nasal cannula was weaned to 2 liters/minute on 2/25/2024 but she started coughing. Repeat chest X-ray on 2/26/2024 paradoxically showed slight increase in left pleural effusion, as well as persistent mild pulmonary vascular congestion. Interventional Radiology was consulted for thoracentesis. They removed 725 mL of pleural fluid on 2/27/2024. Fluid was sent for cultures. She continued to feel short of breath with tachypnea on 2/28/2024. Net fluid status since admission was negative 13.8 liters at this point. Hypoxia resolved but she was coughing more. Chest CTA showed a new left lower lobe pneumonia. She was put on a course of  doxycycline. Pharmacy recommended adding cefpodoxime. She was discharged to the skilled nursing facility on 3/1/2024.     Goals of Care Treatment Preferences:  Code Status: Full Code          What is most important right now is to focus on extending life as long as possible, even it it means sacrificing quality, curative/life-prolongation (regardless of treatment burdens).  Accordingly, we have decided that the best plan to meet the patient's goals includes continuing with treatment.      Consults:   Consults (From admission, onward)          Status Ordering Provider     Inpatient consult to Cardiology  Once        Provider:  (Not yet assigned)    Completed GUERO MCMULLEN     Inpatient consult to Interventional Cardiology  Once        Provider:  (Not yet assigned)    Completed GISELE COOPER     Inpatient consult to Palliative Care  Once        Provider:  (Not yet assigned)    Completed TONI QUIÑONEZ     Inpatient consult to Interventional Radiology  Once        Provider:  (Not yet assigned)    Completed GABRIEL GALLEGOS     Inpatient consult to Interventional Cardiology  Once        Provider:  (Not yet assigned)    Completed GEOFF TA     Inpatient consult to Registered Dietitian/Nutritionist  Once        Provider:  (Not yet assigned)    Completed GABRIEL GALLEGOS     Inpatient consult to Midline team  Once        Provider:  (Not yet assigned)    Completed GABRIEL GALLEGOS     Inpatient consult to Infectious Diseases  Once        Provider:  (Not yet assigned)    Completed TONI QUIÑONEZ     Inpatient consult to Social Work  Once        Provider:  (Not yet assigned)    Completed TONI QUIÑONEZ     Inpatient consult to Cardiology  Once        Provider:  (Not yet assigned)    Completed TONI QUIÑONEZ RANDY     Inpatient consult to Critical Care Medicine  Once        Provider:  (Not yet assigned)    Completed SANDRA VERONICA     Inpatient consult to Nephrology  Once         Provider:  (Not yet assigned)    GUILLERMO Bell          Final Active Diagnoses:    Diagnosis Date Noted POA    Left lower lobe pneumonia [J18.9] 02/29/2024 No    Moderate malnutrition [E44.0] 02/13/2024 Yes    Pericardial effusion [I31.39] 02/12/2024 No    Secondary hyperparathyroidism [N25.81] 02/12/2024 Yes     Chronic    Acute decompensated heart failure [I50.9] 02/12/2024 Yes    Atrial flutter [I48.92] 02/12/2024 Yes    Chronic kidney disease-mineral and bone disorder [N18.9, E83.9, M89.9] 01/30/2024 Yes     Chronic    Gastric ulcer [K25.9] 01/24/2024 Yes    Pleural effusion on left [J90] 01/15/2024 Yes    Atrial fibrillation [I48.91] 01/05/2024 Yes     Chronic    ESRD (end stage renal disease) [N18.6] 01/02/2024 Yes     Chronic    Anemia in ESRD (end-stage renal disease) [N18.6, D63.1] 01/30/2023 Yes     Chronic    Generalized anxiety disorder [F41.1] 06/02/2022 Yes     Chronic    Type 2 diabetes mellitus with diabetic polyneuropathy, without long-term current use of insulin [E11.42] 10/17/2019 Yes     Chronic    Hyperlipidemia, mixed [E78.2] 10/17/2019 Yes     Chronic      Problems Resolved During this Admission:    Diagnosis Date Noted Date Resolved POA    PRINCIPAL PROBLEM:  Acute hypoxemic respiratory failure [J96.01] 01/15/2023 02/29/2024 Yes    Hypernatremia [E87.0] 02/21/2024 02/21/2024 Unknown    Shortness of breath [R06.02] 02/15/2024 02/22/2024 Yes    Goals of care, counseling/discussion [Z71.89] 02/15/2024 02/22/2024 Not Applicable    Advanced care planning/counseling discussion [Z71.89] 02/15/2024 02/22/2024 Not Applicable    Palliative care encounter [Z51.5] 10/17/2019 02/22/2024 Not Applicable       Discharged Condition: good    Disposition: Skilled Nursing Facility    Follow Up:   Follow-up Information       [id number] Follow up.    Specialty: Transplant  Contact information:  Nahum Dow  [facility] [address] [phone] additional  information:  Cardiology Services Clinics - Main Building, Atrium 3rd Floor   Please park in [facility] and use Atrium elevator                         Patient Instructions:      Ambulatory referral/consult to Adult Advanced Heart Failure   Standing Status: Future   Referral Priority: Routine Referral Type: Transplants   Number of Visits Requested: 1     Diet renal     Notify your health care provider if you experience any of the following:  difficulty breathing or increased cough     Notify your health care provider if you experience any of the following:  persistent dizziness, light-headedness, or visual disturbances     Change dressing (specify)   Order Comments: Sacrum/ buttocks- Triad barrier Q shift.     Activity as tolerated       Significant Diagnostic Studies:   X-Ray Chest 1 View 2/12/24: FINDINGS:  There has been removal of the right-sided chest tube.  There is no pneumothorax.  There is a dual lumen central venous catheter, unchanged in position from prior.  There is a partially loculated small right pleural effusion and a layering small left pleural effusion.  There are stable bilateral patchy airspace opacities and hazy opacities in the bilateral lung bases.  There is no pneumothorax.  The cardiac silhouette is enlarged.  There is a loop recorder device.  There is a left subclavian vascular stent.  There are remote right-sided rib fractures.  Osseous structures otherwise demonstrate degenerative changes.  Impression:  Interval removal of the right chest tube.  No pneumothorax.  Stable pulmonary edema or multifocal pneumonia.  Small bilateral pleural effusions.  Transthoracic echo complete with contrast 2/12/24:     Left Ventricle: The left ventricle is normal in size. Normal wall thickness. Regional wall motion abnormalities present. See diagram for wall motion findings. There is severely reduced systolic function with a visually estimated ejection fraction of 15 - 20%. Ejection fraction by visual  approximation is 15%. Unable to assess diastolic function due to atrial fibrillation.    Right Ventricle: Normal right ventricular cavity size. Wall thickness is normal. Right ventricle wall motion  is normal. Systolic function is normal.    Left Atrium: Left atrium is severely dilated.    Right Atrium: Right atrium is mildly dilated.    Aortic Valve: There is moderate aortic valve sclerosis. Mildly restricted motion.    Mitral Valve: There is moderate to severe regurgitation.    Tricuspid Valve: There is mild regurgitation.    IVC/SVC: Intermediate venous pressure at 8 mmHg.    Pericardium: There is a trivial effusion. Left pleural effusion.  CTA Chest Non-Coronary (PE Studies) 2/12/24: FINDINGS:  Exam quality: Satisfactory.  Pulmonary embolism: No acute or chronic pulmonary embolism.  Central pulmonary arteries: Borderline enlargement of the main pulmonary artery to 31 mm transverse dimension, nonspecific finding which may be seen the setting of pulmonary arterial hypertension.  Aorta: Nonenlarged.  Left-sided arch with conventional 3 vessel arch anatomy.  Moderate atherosclerosis.  Heart: Appears at least mildly enlarged.  Coronary arteries: At least mild atherosclerotic calcifications.  Pericardium: Pericardial effusion measuring up to at least 12 mm in thickness, as measured ventral to the right ventricle.  Support tubes and lines: Tunneled dual lumen right internal jugular approach central venous catheter appears to terminate within the right atrium.  Presence of loop recorder device is suggested.  Base of neck/thyroid: Diminutive caliber thyroid gland as before.  Left brachiocephalic/subclavian/proximal axillary venous stent again identified.  Question SVC stenosis, similar configuration to the 01/13/2024 CT.  Lymph nodes: Relatively similar thoracic adenopathy compared to 01/13/2024.  Reference stable 18 mm short axis subcarinal node (axial series 2, image 197).  Esophagus: Esophagus appears patulous with  some degree of retained material, findings which may be seen in the setting of dysmotility the appropriate clinical context.  There remains soft tissue fullness in the lower right paraesophageal soft tissues (for example as seen on axial series 2, image 227), measures on the order of 24 x 30 mm transaxial dimension, relatively similar to slightly decreased in fullness when compared to the 01/13/2024 chest CT.  Persistent inflammatory change related to previous identified as azygoesophageal recess gas containing collection identified on the 01/08/2024 abdomen and pelvis CT to be considered.  Attention on follow-up would be recommended.  Pleura: Moderate to large loculated pleural effusions bilaterally.  Upper abdomen: Unremarkable  Body wall: Suggested at least mild diffuse body wall edema.  Airways: Central airways appear grossly patent.  Nonspecific bronchial wall thickening most pronounced dependently in the lower lobes.  Lungs: Motion compromised assessment.  Atelectasis.  Postop sequela of the right middle lobe, as before.  No new masslike soft tissue is appreciated the current study, allowing for limitations imposed by motion.  Bones: No definite acute abnormality.  Degenerative findings of the spine.  Impression:  1. No convincing evidence of acute pulmonary embolism.  2. Bilateral pleural effusions, pericardial effusion, diffuse body wall edema, and suggested pulmonary vascular congestion/interstitial edema.  Correlation with patient volume status recommended.  3. There remains soft tissue fullness in the lower right paraesophageal soft tissues measures on the order of 24 x 30 mm transaxial dimension, relatively similar to slightly decreased in fullness when compared to the 01/13/2024 chest CT. Persistent inflammatory change related to previous identified as azygoesophageal recess gas containing collection identified on the 01/08/2024 abdomen and pelvis CT to be considered. Attention on follow-up would be  recommended.  4. Additional details, as provided in the body of the report.  X-Ray Chest 1 View 2/14/24: FINDINGS:  Cardiac size is stable.  Vascular catheter seen in the superior vena cava.  Large amount of pleural fluid is seen on the left and some pleural fluid or thickening is present on the right.  Patchy airspace consolidation and edema can be seen in the lungs and is little worse than was 3 days ago.  Transthoracic echo 2D only 2/14/24:     Left Ventricle: The left ventricle is normal in size. Normal wall thickness. Normal wall motion. There is severely reduced systolic function with a visually estimated ejection fraction of 20 - 25%. There is diastolic dysfunction.    Right Ventricle: Normal right ventricular cavity size. Wall thickness is normal. Right ventricle wall motion has segmental dysfunction with akinesis of the mid to distal free wall. Systolic function is moderately reduced.    Left Atrium: Left atrium is moderately dilated.    Aortic Valve: Aortic valve thickening. There is moderate aortic valve sclerosis. There is annular calcification present.    IVC/SVC: Normal venous pressure at 3 mmHg.    Pericardium: Left pleural effusion.  X-Ray Chest 1 View 2/15/24:   FINDINGS:  There is been interval left thoracentesis, noting essential resolution of the left pleural effusion.  There is no pneumothorax or other significant detrimental change in the cardiopulmonary status since the previous exam.  X-Ray Chest AP Portable 2/15/24: FINDINGS:  Right central venous catheter tip projects over the distal SVC.  Since the previous exam, there is some accumulation of left pleural effusion, right effusion appears stable.  Coarse interstitial attenuation bilaterally suggests edema, the process appears to have worsened throughout the left hemithorax.  There is no pneumothorax or other significant detrimental change since the previous exam.  X-Ray Chest 1 View 2/17/24: FINDINGS:  Cardiomediastinal silhouetteis stable.   Right jugular tunnel dialysis catheter remains in place.  Surgical clips overlie the right upper chest.  Left subclavian and innominate venous wall stents redemonstrated.  Perihilar interstitial and alveolar edema associated with bibasilar consolidation  From pleural fluid and atelectasis and or pneumonia does not appear significantly changed.  There is interval resolution of ovoid opacity in the right mid lung that likely represented some pleural fluid trapped in the oblique fissure.  Impression:  Stable volume overload and pulmonary edema  X-Ray Chest 1 View 2/19/24: FINDINGS:  Reconfirmed large-bore right jugular catheter, tip as before superimposing cavoatrial soft tissues.  Stable enlargement of cardiopericardial silhouette, arch calcification, pulmonary vascular congestion, bilateral interstitial opacities, likely edema and more confluent left lower lobe-retrocardiac opacity leading to obscured left hemidiaphragm that could relate to loculated fluid and or atelectasis and or infiltrate.  Reconfirmed trace bilateral effusions.  No right or left pneumothorax.  Stable left subclavian and innominate venous wall stents.  EKG leads superimposed chest and abdomen.  Stable clips project about the right hemithorax.  Suture anchor seen in the right the humeral head.  Impression:  Allowing for differences in patient positioning, no significant interval changes.  Left heart catheterization 2/21/24:  Left Main   The vessel is angiographically normal.      Left Anterior Descending   Exhibits minimal luminal irregularities.      First Diagonal Branch   The vessel is moderate in size. The vessel exhibits minimal luminal irregularities.      Second Diagonal Branch   The vessel is small. The vessel exhibits minimal luminal irregularities.      Third Diagonal Branch   The vessel is small. The vessel exhibits minimal luminal irregularities.      Left Circumflex   The vessel is angiographically normal.      First Obtuse Marginal  Branch   The vessel exhibits minimal luminal irregularities.      Second Obtuse Marginal Branch   The vessel is angiographically normal.      Third Obtuse Marginal Branch   The vessel is angiographically normal.      Left Posterior Descending Artery   The vessel is angiographically normal.      First Left Posterolateral Branch   The vessel is angiographically normal.      Second Left Posterolateral Branch   The vessel is angiographically normal.      Right Coronary Artery   The vessel is angiographically normal.        Medications:  Reconciled Home Medications:      Medication List        START taking these medications      amiodarone 200 MG Tab  Commonly known as: PACERONE  Take 1 tablet (200 mg total) by mouth 2 (two) times daily.     aspirin 81 MG EC tablet  Commonly known as: ECOTRIN  Take 1 tablet (81 mg total) by mouth once daily.     doxycycline 100 MG tablet  Commonly known as: VIBRA-TABS  Take 1 tablet (100 mg total) by mouth every 12 (twelve) hours. End date 3/7/24 for 6 days     valsartan 40 MG tablet  Commonly known as: DIOVAN  Take 0.5 tablets (20 mg total) by mouth 2 (two) times daily.            CHANGE how you take these medications      cefpodoxime 200 MG tablet  Commonly known as: VANTIN  Take 1 tablet (200 mg total) by mouth once daily. End date 3/7/2024 for 6 days  What changed:   when to take this  additional instructions            CONTINUE taking these medications      atorvastatin 40 MG tablet  Commonly known as: LIPITOR  Take 1 tablet (40 mg total) by mouth every evening.     BETAHISTINE (BULK) MISC     blood sugar diagnostic Strp  Commonly known as: TRUE METRIX GLUCOSE TEST STRIP  USE ONE STRIP FOR TESTING 2 TIMES A DAY     blood-glucose meter kit  Use to test twice a day     calcium-vitamin D3 500 mg-5 mcg (200 unit) per tablet  Commonly known as: OS-CIPRIANO 500 + D3  Take 1 tablet by mouth once daily.     ferrous gluconate 324 MG tablet  Commonly known as: FERGON  Take 1 tablet (324 mg total)  "by mouth daily with breakfast.     insulin aspart U-100 100 unit/mL (3 mL) Inpn pen  Commonly known as: NovoLOG  Inject before meals:  150-200=+1, 201-250=+2, 251-300=+3; 301-350=+4, over 350=+5 units     lancets Misc  1 lancet by Misc.(Non-Drug; Combo Route) route 4 (four) times daily.     meclizine 25 mg tablet  Commonly known as: ANTIVERT  Take 1 tablet (25 mg total) by mouth 3 (three) times daily as needed for Dizziness.     metoprolol tartrate 25 MG tablet  Commonly known as: LOPRESSOR  Take 0.5 tablets (12.5 mg total) by mouth 2 (two) times daily.     midodrine 10 MG tablet  Commonly known as: PROAMATINE  Take 1 tablet (10 mg total) by mouth 2 (two) times daily with meals.     nutritional supplements Liqd  Take 237 mLs by mouth 4 (four) times daily.     pantoprazole 40 MG tablet  Commonly known as: PROTONIX  Take 1 tablet (40 mg total) by mouth 2 (two) times daily before meals.     patiromer calcium sorbitex 8.4 gram Pwpk  Commonly known as: VELTASSA  Take 2 packets (16.8 g total) by mouth once daily.     pen needle, diabetic 32 gauge x 5/32" Ndle  Commonly known as: BD ULTRA-FINE MARIS PEN NEEDLE  1 Device by Misc.(Non-Drug; Combo Route) route 4 (four) times daily with meals and nightly.     pregabalin 75 MG capsule  Commonly known as: LYRICA  Take 1 capsule (75 mg total) by mouth every other day. Give after HD     MELLISSA-BENJY 0.8 mg Tab  Generic drug: B complex-vitamin C-folic acid  Take 1 tablet (0.8 mg total) by mouth once daily.     sevelamer  MG Tab  Commonly known as: RENAGEL  Take 2 tablets (1,600 mg total) by mouth 3 (three) times daily with meals.     torsemide 20 MG Tab  Commonly known as: DEMADEX  Take 20 mg by mouth 2 (two) times daily.     venlafaxine 37.5 MG Tab  Commonly known as: EFFEXOR  Take 1 tablet (37.5 mg total) by mouth once daily.     vit C,Z-Hb-jdlfd-lutein-zeaxan 250-90-40-1 mg Cap  Commonly known as: PRESERVISION AREDS 2            STOP taking these medications  "     metroNIDAZOLE 500 MG tablet  Commonly known as: FLAGYL              Indwelling Lines/Drains at time of discharge:   Lines/Drains/Airways       Central Venous Catheter Line  Duration             Permacath right subclavian -- days         Hemodialysis Catheter 01/25/23 1501 right internal jugular 392 days              Drain  Duration                  Hemodialysis AV Fistula 09/01/23 0800 Left forearm 174 days                    Time spent on the discharge of patient: 35 minutes         Patrick Guardado MD  Department of Hospital Medicine  [facility] - Telemetry Stepdown

## 2024-02-22 NOTE — PROGRESS NOTES
[facility] - Telemetry [facility] Medicine  Progress Note    Patient Name: Lily Green  MRN: [id number]  Patient Class: IP- Inpatient   Admission Date: 2/12/2024  Length of Stay: 10 days  Attending Physician: Patrick Guardado MD  Primary Care Provider: Pavel Calixto MD        Subjective:     Principal Problem:Acute hypoxemic respiratory failure        HPI:  Lily Green is a 73 year old white woman with celiac disease, diabetes mellitus type 2 with polyneuropathy, hypertension, hyperlipidemia, history of peptic ulcer disease, depression, anxiety, history of hysterectomy in 1987, history of peritonitis due to diffuse colonic ischemia status post total colectomy and end ileostomy creation on 1/14/2023, evacuation of intraabdominal hematoma, takedown of left lateral ileostomy and recreation of right lateral end ileostomy on 1/16/2023, elective robotic ileostomy reversal and ileocolonic anastomosis on 8/7/2023, irritable bowel syndrome, end stage renal disease status post right internal jugular permanent dialysis catheter on 1/25/2023 removed on 1/28/2023 and replaced with left internal jugular permanent dialysis catheter (on hemodialysis Tuesday Thursday Saturday), right middle lobe carcinoid tumor status post robotic-assisted converted to right thoracotomy with therapeutic wedge resection and mediastinal lymph node dissection and repair of bleeding right inferior pulmonary vein on 1/2/2024, atrial fibrillation, Medtronic Micra AV leadless cardiac pacemaker placement on 1/16/2024. She lives in [place], [place]. She is . Her primary care physician is Dr. Pavel Calixto.               She was hospitalized at [facility] from 1/2/2024 to 2/6/2024 for right middle lobe carcinoid tumor, parapneumonic effusion, atrial fibrillation with rapid ventricular response, tachy-eulogio syndrome, gastrointestinal bleeding while on heparin. She was discharged to [facility] nursing [facility]. She was prescribed cefpodoxime and metronidazole for 13 days, fluconazole for 7 days, metoprolol tartrate, and venlafaxine.               She presented to [facility] Emergency Department on 2/12/2024 from the [facility] nursing [facility] with increased work of breathing, dyspnea, and hypoxia with oxygen saturation of 70% on 2 liters/minute of supplemental oxygen. It occurred acutely on the day of presentation. Emergency medical services put her on CPAP with some improvement. She was put on BiPAP in the emergency department. She had tachycardia and tachypnea. Her last dialysis session was 2 days prior on Saturday. She reported cough and nausea. Labs showed BNP 2754 pg/mL, troponin 0.312 ng/mL. Venous blood gas showed pH 7.438, pCO2 38.9. EKG showed sinus tachycardia. Chest X-ray showed pleural effusions and possible multifocal pneumonia. Nephrology was consulted for emergent dialysis with goal of removing 3 liters. She was admitted to Critical Care Medicine.    Overview/Hospital Course:  Echocardiogram revealed new low left ventricular ejection fraction. Troponins remained elevated. Cardiology was consulted. She developed atrial fibrillation with rapid ventricular response on 2/13/2024. She was started on amiodarone infusion. She underwent slow low-efficiency dialysis (SLED). CT showed fluid collections in the right paraesophageal space and azygoesophageal recess. Cardiothoracic Surgery was consulted and recommended Interventional Radiology consult for drainage, but there was no safe window for approach. Interventional Cardiology deferred angiography due to respiratory failure. Palliative Care was consulted. Dialysis removed further fluid. Metoprolol was increased. Thoracentesis was performed on 2/15/2024 removing 1.45 liters, which was sent for analysis. She developed re-expansion pulmonary edema and was started on Airvo high flow oxygen. Airvo was weaned  down to 50 liters/minute and FiO2 40% on 2/16/2024. Pleural effusion was transudative. She was started on vasopressor support. She was weaned to high flow nasal cannula at 12 liters/minute on 2/17/2024. She tolerated SLED without norepinephrine. She was weaned to 4 liters/minute on 2/18/2024. She was transferred to [facility] Medicine Team W on 2/20/2024. She was started on nifedipine. She worked with Physical and Occupational Therapy. Interventional Cardiology was reconsulted. Left heart catheterization showed luminal irregularities. Interventional Cardiology recommended starting aspirin 81 mg daily and atorvastatin 10 mg daily and referral to Heart Transplant Service for management of nonischemic cardiomyopathy. She was already on atorvastatin 40 mg daily. Palliative Care signed off and planned to see her outpatient. She continued to require 4 liters/minute of supplemental oxygen.     Interval History: Still on 4 liters/minute of O2. Does not feel ready for discharge yet.    Review of Systems   Constitutional:  Negative for chills and fever.   Respiratory:  Positive for shortness of breath. Negative for cough.    Gastrointestinal:  Negative for blood in stool.   Neurological:  Negative for syncope and headaches.     Objective:     Vital Signs (Most Recent):  Temp: 97.6 °F (36.4 °C) (02/22/24 1123)  Pulse: 88 (02/22/24 1200)  Resp: (!) 22 (02/22/24 1200)  BP: 131/81 (02/22/24 1123)  SpO2: 97 % (02/22/24 1200) Vital Signs (24h Range):  Temp:  [96.4 °F (35.8 °C)-98.6 °F (37 °C)] 97.6 °F (36.4 °C)  Pulse:  [] 88  Resp:  [16-22] 22  SpO2:  [92 %-100 %] 97 %  BP: (102-145)/(54-81) 131/81     Weight: 67.6 kg (149 lb 0.5 oz)  Body mass index is 24.8 kg/m².    Intake/Output Summary (Last 24 hours) at 2/22/2024 1401  Last data filed at 2/22/2024 0100  Gross per 24 hour   Intake 600 ml   Output 3600 ml   Net -3000 ml           Physical Exam  Vitals and nursing note reviewed.   Constitutional:       General: She is not in  acute distress.     Appearance: She is not diaphoretic.      Interventions: Nasal cannula in place.   Pulmonary:      Effort: Pulmonary effort is normal. No respiratory distress.   Skin:     General: Skin is warm and dry.      Coloration: Skin is not jaundiced or pale.   Neurological:      Mental Status: She is alert and oriented to person, place, and time. Mental status is at baseline.      Motor: No seizure activity.   Psychiatric:         Attention and Perception: Attention normal.         Behavior: Behavior normal. Behavior is not aggressive or combative.             Significant Labs: All pertinent labs within the past 24 hours have been reviewed.    Significant Imaging: I have reviewed all pertinent imaging results/findings within the past 24 hours.  X-Ray Chest 1 View 2/12/24: FINDINGS:  There has been removal of the right-sided chest tube.  There is no pneumothorax.  There is a dual lumen central venous catheter, unchanged in position from prior.  There is a partially loculated small right pleural effusion and a layering small left pleural effusion.  There are stable bilateral patchy airspace opacities and hazy opacities in the bilateral lung bases.  There is no pneumothorax.  The cardiac silhouette is enlarged.  There is a loop recorder device.  There is a left subclavian vascular stent.  There are remote right-sided rib fractures.  Osseous structures otherwise demonstrate degenerative changes.  Impression:  Interval removal of the right chest tube.  No pneumothorax.  Stable pulmonary edema or multifocal pneumonia.  Small bilateral pleural effusions.  Transthoracic echo complete with contrast 2/12/24:     Left Ventricle: The left ventricle is normal in size. Normal wall thickness. Regional wall motion abnormalities present. See diagram for wall motion findings. There is severely reduced systolic function with a visually estimated ejection fraction of 15 - 20%. Ejection fraction by visual approximation is  15%. Unable to assess diastolic function due to atrial fibrillation.    Right Ventricle: Normal right ventricular cavity size. Wall thickness is normal. Right ventricle wall motion  is normal. Systolic function is normal.    Left Atrium: Left atrium is severely dilated.    Right Atrium: Right atrium is mildly dilated.    Aortic Valve: There is moderate aortic valve sclerosis. Mildly restricted motion.    Mitral Valve: There is moderate to severe regurgitation.    Tricuspid Valve: There is mild regurgitation.    IVC/SVC: Intermediate venous pressure at 8 mmHg.    Pericardium: There is a trivial effusion. Left pleural effusion.  CTA Chest Non-Coronary (PE Studies) 2/12/24: FINDINGS:  Exam quality: Satisfactory.  Pulmonary embolism: No acute or chronic pulmonary embolism.  Central pulmonary arteries: Borderline enlargement of the main pulmonary artery to 31 mm transverse dimension, nonspecific finding which may be seen the setting of pulmonary arterial hypertension.  Aorta: Nonenlarged.  Left-sided arch with conventional 3 vessel arch anatomy.  Moderate atherosclerosis.  Heart: Appears at least mildly enlarged.  Coronary arteries: At least mild atherosclerotic calcifications.  Pericardium: Pericardial effusion measuring up to at least 12 mm in thickness, as measured ventral to the right ventricle.  Support tubes and lines: Tunneled dual lumen right internal jugular approach central venous catheter appears to terminate within the right atrium.  Presence of loop recorder device is suggested.  Base of neck/thyroid: Diminutive caliber thyroid gland as before.  Left brachiocephalic/subclavian/proximal axillary venous stent again identified.  Question SVC stenosis, similar configuration to the 01/13/2024 CT.  Lymph nodes: Relatively similar thoracic adenopathy compared to 01/13/2024.  Reference stable 18 mm short axis subcarinal node (axial series 2, image 197).  Esophagus: Esophagus appears patulous with some degree of  retained material, findings which may be seen in the setting of dysmotility the appropriate clinical context.  There remains soft tissue fullness in the lower right paraesophageal soft tissues (for example as seen on axial series 2, image 227), measures on the order of 24 x 30 mm transaxial dimension, relatively similar to slightly decreased in fullness when compared to the 01/13/2024 chest CT.  Persistent inflammatory change related to previous identified as azygoesophageal recess gas containing collection identified on the 01/08/2024 abdomen and pelvis CT to be considered.  Attention on follow-up would be recommended.  Pleura: Moderate to large loculated pleural effusions bilaterally.  Upper abdomen: Unremarkable  Body wall: Suggested at least mild diffuse body wall edema.  Airways: Central airways appear grossly patent.  Nonspecific bronchial wall thickening most pronounced dependently in the lower lobes.  Lungs: Motion compromised assessment.  Atelectasis.  Postop sequela of the right middle lobe, as before.  No new masslike soft tissue is appreciated the current study, allowing for limitations imposed by motion.  Bones: No definite acute abnormality.  Degenerative findings of the spine.  Impression:  1. No convincing evidence of acute pulmonary embolism.  2. Bilateral pleural effusions, pericardial effusion, diffuse body wall edema, and suggested pulmonary vascular congestion/interstitial edema.  Correlation with patient volume status recommended.  3. There remains soft tissue fullness in the lower right paraesophageal soft tissues measures on the order of 24 x 30 mm transaxial dimension, relatively similar to slightly decreased in fullness when compared to the 01/13/2024 chest CT. Persistent inflammatory change related to previous identified as azygoesophageal recess gas containing collection identified on the 01/08/2024 abdomen and pelvis CT to be considered. Attention on follow-up would be recommended.  4.  Additional details, as provided in the body of the report.  X-Ray Chest 1 View 2/14/24: FINDINGS:  Cardiac size is stable.  Vascular catheter seen in the superior vena cava.  Large amount of pleural fluid is seen on the left and some pleural fluid or thickening is present on the right.  Patchy airspace consolidation and edema can be seen in the lungs and is little worse than was 3 days ago.  Transthoracic echo 2D only 2/14/24:     Left Ventricle: The left ventricle is normal in size. Normal wall thickness. Normal wall motion. There is severely reduced systolic function with a visually estimated ejection fraction of 20 - 25%. There is diastolic dysfunction.    Right Ventricle: Normal right ventricular cavity size. Wall thickness is normal. Right ventricle wall motion has segmental dysfunction with akinesis of the mid to distal free wall. Systolic function is moderately reduced.    Left Atrium: Left atrium is moderately dilated.    Aortic Valve: Aortic valve thickening. There is moderate aortic valve sclerosis. There is annular calcification present.    IVC/SVC: Normal venous pressure at 3 mmHg.    Pericardium: Left pleural effusion.  X-Ray Chest 1 View 2/15/24:   FINDINGS:  There is been interval left thoracentesis, noting essential resolution of the left pleural effusion.  There is no pneumothorax or other significant detrimental change in the cardiopulmonary status since the previous exam.  X-Ray Chest AP Portable 2/15/24: FINDINGS:  Right central venous catheter tip projects over the distal SVC.  Since the previous exam, there is some accumulation of left pleural effusion, right effusion appears stable.  Coarse interstitial attenuation bilaterally suggests edema, the process appears to have worsened throughout the left hemithorax.  There is no pneumothorax or other significant detrimental change since the previous exam.  X-Ray Chest 1 View 2/17/24: FINDINGS:  Cardiomediastinal silhouetteis stable.  Right jugular  tunnel dialysis catheter remains in place.  Surgical clips overlie the right upper chest.  Left subclavian and innominate venous wall stents redemonstrated.  Perihilar interstitial and alveolar edema associated with bibasilar consolidation  From pleural fluid and atelectasis and or pneumonia does not appear significantly changed.  There is interval resolution of ovoid opacity in the right mid lung that likely represented some pleural fluid trapped in the oblique fissure.  Impression:  Stable volume overload and pulmonary edema  X-Ray Chest 1 View 2/19/24: FINDINGS:  Reconfirmed large-bore right jugular catheter, tip as before superimposing cavoatrial soft tissues.  Stable enlargement of cardiopericardial silhouette, arch calcification, pulmonary vascular congestion, bilateral interstitial opacities, likely edema and more confluent left lower lobe-retrocardiac opacity leading to obscured left hemidiaphragm that could relate to loculated fluid and or atelectasis and or infiltrate.  Reconfirmed trace bilateral effusions.  No right or left pneumothorax.  Stable left subclavian and innominate venous wall stents.  EKG leads superimposed chest and abdomen.  Stable clips project about the right hemithorax.  Suture anchor seen in the right the humeral head.  Impression:  Allowing for differences in patient positioning, no significant interval changes.  Left heart catheterization 2/21/24:  Left Main   The vessel is angiographically normal.      Left Anterior Descending   Exhibits minimal luminal irregularities.      First Diagonal Branch   The vessel is moderate in size. The vessel exhibits minimal luminal irregularities.      Second Diagonal Branch   The vessel is small. The vessel exhibits minimal luminal irregularities.      Third Diagonal Branch   The vessel is small. The vessel exhibits minimal luminal irregularities.      Left Circumflex   The vessel is angiographically normal.      First Obtuse Marginal Branch   The  vessel exhibits minimal luminal irregularities.      Second Obtuse Marginal Branch   The vessel is angiographically normal.      Third Obtuse Marginal Branch   The vessel is angiographically normal.      Left Posterior Descending Artery   The vessel is angiographically normal.      First Left Posterolateral Branch   The vessel is angiographically normal.      Second Left Posterolateral Branch   The vessel is angiographically normal.      Right Coronary Artery   The vessel is angiographically normal.          Assessment/Plan:      * Acute hypoxemic respiratory failure  Wean oxygen as tolerated. Still requiring 4 liters/minute.     Moderate malnutrition  Nutrition consulted. Most recent weight and BMI monitored-     Measurements:  Wt Readings from Last 1 Encounters:   02/20/24 67.6 kg (149 lb 0.5 oz)   Body mass index is 24.8 kg/m².    Patient has been screened and assessed by RD.    Malnutrition Type:  Context: chronic illness  Level: moderate    Malnutrition Characteristic Summary:  Energy Intake (Malnutrition): less than 75% for greater than or equal to 1 month  Muscle Mass (Malnutrition): mild depletion  Fluid Accumulation (Malnutrition): severe    Interventions/Recommendations (treatment strategy):  1.Continue gluten free diet with texture per SLP 2. modify ONS to [facility] renal TID 3. RD to monitor and follow       Atrial flutter  Started amiodarone. Anticoagulation contraindicated.      Acute decompensated heart failure  LHC done. Nonischemic cardiomyopathy. Consult [facility] for management recommendations.    Secondary hyperparathyroidism  Stable.      Pericardial effusion  Treating fluid overload      Chronic kidney disease-mineral and bone disorder  Continue home sevelamer.    Gastric ulcer  Prescribed PPI to take until 2/23/2024.    Pleural effusion on left  Treating fluid overload.        Atrial fibrillation  Chronic. Home metoprolol changed to amiodarone.    ESRD (end stage renal disease)  Nephrology  following.    Anemia in ESRD (end-stage renal disease)  Patient's anemia is currently controlled. Has not received any PRBCs to date. Etiology likely d/t chronic disease due to Chronic Kidney Disease/ESRD  Current CBC reviewed-   Lab Results   Component Value Date    HGB 7.9 (L) 02/21/2024    HCT 25.7 (L) 02/21/2024     Monitor serial CBC and transfuse if patient becomes hemodynamically unstable, symptomatic or H/H drops below 7/21.    Generalized anxiety disorder  Continue home venlafaxine       Hyperlipidemia, mixed  Continue home atorvastatin.      Type 2 diabetes mellitus with diabetic polyneuropathy, without long-term current use of insulin  Patient's FSGs are controlled on current medication regimen.  Last A1c reviewed-   Lab Results   Component Value Date    HGBA1C 4.9 11/15/2023     Most recent fingerstick glucose reviewed-   Recent Labs   Lab 02/20/24  1713 02/20/24  2245 02/21/24  0937   POCTGLUCOSE 214* 220* 189*       Current correctional scale  Low  Maintain anti-hyperglycemic dose as follows-   Antihyperglycemics (From admission, onward)      Start     Stop Route Frequency Ordered    02/15/24 1054  insulin aspart U-100 pen 0-10 Units         -- SubQ Before meals & nightly PRN 02/15/24 1055          Hold Oral hypoglycemics while patient is in the hospital.      VTE Risk Mitigation (From admission, onward)           Ordered     heparin (porcine) injection 5,000 Units  Every 8 hours         02/12/24 0416     IP VTE HIGH RISK PATIENT  Once         02/12/24 0416     Place sequential compression device  Until discontinued         02/12/24 0416     heparin (porcine) injection 1,000 Units  As needed (PRN)         02/12/24 0229                    Discharge Planning   CAMPBELL: 2/26/2024     Code Status: Full Code   Is the patient medically ready for discharge?: No    Reason for patient still in hospital (select all that apply): Patient trending condition, Treatment, and Pending disposition  Discharge Plan A: Skilled  Nursing Facility   Discharge Delays: None known at this time              Patrick Guardado MD  Department of Hospital Medicine   [facility] - Telemetry Stepdown

## 2024-02-22 NOTE — NURSING
Pt had her dialysis last night and 3L of fluid was removed. Pt vital signs was monitored closely. No sign of distress noted. Pt is Aox4. Pt is still on O2 at 4L via nasal canula.

## 2024-02-22 NOTE — PLAN OF CARE
Pt [employer] Auth was completed through [employer].     GRAYSON Steele   Case Management Dept-[facility]  [phone]

## 2024-02-23 LAB
ALBUMIN SERPL BCP-MCNC: 1.9 G/DL (ref 3.5–5.2)
ALP SERPL-CCNC: 86 U/L (ref 55–135)
ALT SERPL W/O P-5'-P-CCNC: 11 U/L (ref 10–44)
ANION GAP SERPL CALC-SCNC: 11 MMOL/L (ref 8–16)
AST SERPL-CCNC: 18 U/L (ref 10–40)
BACTERIA SPEC ANAEROBE CULT: NORMAL
BASOPHILS # BLD AUTO: 0.03 K/UL (ref 0–0.2)
BASOPHILS NFR BLD: 0.3 % (ref 0–1.9)
BILIRUB SERPL-MCNC: 0.3 MG/DL (ref 0.1–1)
BUN SERPL-MCNC: 33 MG/DL (ref 8–23)
CALCIUM SERPL-MCNC: 8.9 MG/DL (ref 8.7–10.5)
CHLORIDE SERPL-SCNC: 97 MMOL/L (ref 95–110)
CO2 SERPL-SCNC: 24 MMOL/L (ref 23–29)
CREAT SERPL-MCNC: 2.6 MG/DL (ref 0.5–1.4)
DIFFERENTIAL METHOD BLD: ABNORMAL
EOSINOPHIL # BLD AUTO: 0.1 K/UL (ref 0–0.5)
EOSINOPHIL NFR BLD: 0.5 % (ref 0–8)
ERYTHROCYTE [DISTWIDTH] IN BLOOD BY AUTOMATED COUNT: 22.7 % (ref 11.5–14.5)
EST. GFR  (NO RACE VARIABLE): 18.9 ML/MIN/1.73 M^2
GLUCOSE SERPL-MCNC: 112 MG/DL (ref 70–110)
HCT VFR BLD AUTO: 23.7 % (ref 37–48.5)
HGB BLD-MCNC: 7.7 G/DL (ref 12–16)
IMM GRANULOCYTES # BLD AUTO: 0.21 K/UL (ref 0–0.04)
IMM GRANULOCYTES NFR BLD AUTO: 2.2 % (ref 0–0.5)
LYMPHOCYTES # BLD AUTO: 1.1 K/UL (ref 1–4.8)
LYMPHOCYTES NFR BLD: 11.5 % (ref 18–48)
MAGNESIUM SERPL-MCNC: 2 MG/DL (ref 1.6–2.6)
MCH RBC QN AUTO: 30.9 PG (ref 27–31)
MCHC RBC AUTO-ENTMCNC: 32.5 G/DL (ref 32–36)
MCV RBC AUTO: 95 FL (ref 82–98)
MONOCYTES # BLD AUTO: 1.2 K/UL (ref 0.3–1)
MONOCYTES NFR BLD: 12.5 % (ref 4–15)
NEUTROPHILS # BLD AUTO: 6.9 K/UL (ref 1.8–7.7)
NEUTROPHILS NFR BLD: 73 % (ref 38–73)
NRBC BLD-RTO: 0 /100 WBC
PHOSPHATE SERPL-MCNC: 2 MG/DL (ref 2.7–4.5)
PLATELET # BLD AUTO: 238 K/UL (ref 150–450)
PMV BLD AUTO: 10.8 FL (ref 9.2–12.9)
POCT GLUCOSE: 126 MG/DL (ref 70–110)
POCT GLUCOSE: 198 MG/DL (ref 70–110)
POCT GLUCOSE: 202 MG/DL (ref 70–110)
POTASSIUM SERPL-SCNC: 3.6 MMOL/L (ref 3.5–5.1)
PROT SERPL-MCNC: 6 G/DL (ref 6–8.4)
RBC # BLD AUTO: 2.49 M/UL (ref 4–5.4)
SODIUM SERPL-SCNC: 132 MMOL/L (ref 136–145)
WBC # BLD AUTO: 9.41 K/UL (ref 3.9–12.7)

## 2024-02-23 PROCEDURE — 97116 GAIT TRAINING THERAPY: CPT

## 2024-02-23 PROCEDURE — 97112 NEUROMUSCULAR REEDUCATION: CPT

## 2024-02-23 PROCEDURE — 97535 SELF CARE MNGMENT TRAINING: CPT

## 2024-02-23 PROCEDURE — 84100 ASSAY OF PHOSPHORUS: CPT | Performed by: INTERNAL MEDICINE

## 2024-02-23 PROCEDURE — 80053 COMPREHEN METABOLIC PANEL: CPT | Performed by: INTERNAL MEDICINE

## 2024-02-23 PROCEDURE — 25000003 PHARM REV CODE 250

## 2024-02-23 PROCEDURE — 25000003 PHARM REV CODE 250: Performed by: INTERNAL MEDICINE

## 2024-02-23 PROCEDURE — 97530 THERAPEUTIC ACTIVITIES: CPT

## 2024-02-23 PROCEDURE — 63600175 PHARM REV CODE 636 W HCPCS

## 2024-02-23 PROCEDURE — 83735 ASSAY OF MAGNESIUM: CPT | Performed by: INTERNAL MEDICINE

## 2024-02-23 PROCEDURE — 99223 1ST HOSP IP/OBS HIGH 75: CPT | Mod: ,,, | Performed by: INTERNAL MEDICINE

## 2024-02-23 PROCEDURE — 36415 COLL VENOUS BLD VENIPUNCTURE: CPT | Performed by: INTERNAL MEDICINE

## 2024-02-23 PROCEDURE — 90935 HEMODIALYSIS ONE EVALUATION: CPT

## 2024-02-23 PROCEDURE — 25000003 PHARM REV CODE 250: Performed by: NURSE PRACTITIONER

## 2024-02-23 PROCEDURE — 25000003 PHARM REV CODE 250: Performed by: STUDENT IN AN ORGANIZED HEALTH CARE EDUCATION/TRAINING PROGRAM

## 2024-02-23 PROCEDURE — 99232 SBSQ HOSP IP/OBS MODERATE 35: CPT | Mod: ,,, | Performed by: NURSE PRACTITIONER

## 2024-02-23 PROCEDURE — 63600175 PHARM REV CODE 636 W HCPCS: Performed by: STUDENT IN AN ORGANIZED HEALTH CARE EDUCATION/TRAINING PROGRAM

## 2024-02-23 PROCEDURE — 63600175 PHARM REV CODE 636 W HCPCS: Performed by: NURSE PRACTITIONER

## 2024-02-23 PROCEDURE — 85025 COMPLETE CBC W/AUTO DIFF WBC: CPT | Performed by: INTERNAL MEDICINE

## 2024-02-23 PROCEDURE — 20600001 HC STEP DOWN PRIVATE ROOM

## 2024-02-23 PROCEDURE — 25000003 PHARM REV CODE 250: Performed by: HOSPITALIST

## 2024-02-23 RX ORDER — SODIUM CHLORIDE 9 MG/ML
INJECTION, SOLUTION INTRAVENOUS ONCE
Status: COMPLETED | OUTPATIENT
Start: 2024-02-23 | End: 2024-02-23

## 2024-02-23 RX ORDER — HEPARIN SODIUM 1000 [USP'U]/ML
1000 INJECTION, SOLUTION INTRAVENOUS; SUBCUTANEOUS
Status: DISCONTINUED | OUTPATIENT
Start: 2024-02-23 | End: 2024-03-01 | Stop reason: HOSPADM

## 2024-02-23 RX ADMIN — ATORVASTATIN CALCIUM 40 MG: 40 TABLET, FILM COATED ORAL at 09:02

## 2024-02-23 RX ADMIN — HEPARIN SODIUM 5000 UNITS: 5000 INJECTION INTRAVENOUS; SUBCUTANEOUS at 06:02

## 2024-02-23 RX ADMIN — HEPARIN SODIUM 5000 UNITS: 5000 INJECTION INTRAVENOUS; SUBCUTANEOUS at 09:02

## 2024-02-23 RX ADMIN — Medication 324 MG: at 09:02

## 2024-02-23 RX ADMIN — AMIODARONE HYDROCHLORIDE 400 MG: 200 TABLET ORAL at 09:02

## 2024-02-23 RX ADMIN — NIFEDIPINE 30 MG: 30 TABLET, FILM COATED, EXTENDED RELEASE ORAL at 09:02

## 2024-02-23 RX ADMIN — ERYTHROPOIETIN 3400 UNITS: 4000 INJECTION, SOLUTION INTRAVENOUS; SUBCUTANEOUS at 04:02

## 2024-02-23 RX ADMIN — PANTOPRAZOLE SODIUM 40 MG: 40 TABLET, DELAYED RELEASE ORAL at 06:02

## 2024-02-23 RX ADMIN — ASPIRIN 81 MG: 81 TABLET, COATED ORAL at 09:02

## 2024-02-23 RX ADMIN — DRONABINOL 5 MG: 2.5 CAPSULE ORAL at 06:02

## 2024-02-23 RX ADMIN — SODIUM CHLORIDE: 9 INJECTION, SOLUTION INTRAVENOUS at 02:02

## 2024-02-23 RX ADMIN — INSULIN ASPART 2 UNITS: 100 INJECTION, SOLUTION INTRAVENOUS; SUBCUTANEOUS at 09:02

## 2024-02-23 RX ADMIN — HEPARIN SODIUM 1000 UNITS: 1000 INJECTION, SOLUTION INTRAVENOUS; SUBCUTANEOUS at 04:02

## 2024-02-23 RX ADMIN — VENLAFAXINE 37.5 MG: 37.5 TABLET ORAL at 09:02

## 2024-02-23 NOTE — PLAN OF CARE
Patient and family updated on plan of care, discharged to SNF dc'd per MD.     Problem: Device-Related Complication Risk (Hemodialysis)  Goal: Safe, Effective Therapy Delivery  Outcome: Ongoing, Progressing     Problem: Infection (Hemodialysis)  Goal: Absence of Infection Signs and Symptoms  Outcome: Ongoing, Progressing     Problem: Adult Inpatient Plan of Care  Goal: Plan of Care Review  Outcome: Ongoing, Progressing     Problem: Adult Inpatient Plan of Care  Goal: Readiness for Transition of Care  Outcome: Ongoing, Progressing

## 2024-02-23 NOTE — SUBJECTIVE & OBJECTIVE
Past Medical History:   Diagnosis Date    Anxiety     Celiac disease 2018    Celiac disease     Depression     Diabetes mellitus     Family history of breast cancer in mother      at age 68    Hyperlipidemia     Hypertension     Meniere disease     Meniere's disease, unspecified ear     Menopause     Peptic ulcer     Peritonitis 2023    Reflux esophagitis     Urinary tract infection     Vaginal infection     Vaginal Pap smear 2014    Pap/Hpv Negative        Past Surgical History:   Procedure Laterality Date    APPENDECTOMY      BREAST SURGERY      Tags    CARPAL TUNNEL RELEASE Bilateral 2017    ,     CLOSURE, COLOSTOMY Left 2023    Procedure: CLOSURE, COLOSTOMY;  Surgeon: Manuel Javier MD;  Location: [facility] OR;  Service: General;  Laterality: Left;    COLONOSCOPY  2018    Normal  ( Juan A)     COLOSTOMY Right 2023    Procedure: CREATION, COLOSTOMY;  Surgeon: Manuel Javier MD;  Location: [facility] OR;  Service: General;  Laterality: Right;    CORONARY ANGIOGRAPHY N/A 2024    Procedure: ANGIOGRAM, CORONARY ARTERY;  Surgeon: Todd Carlisle MD;  Location: [facility] CATH LAB;  Service: Cardiology;  Laterality: N/A;    DILATION AND CURETTAGE OF UTERUS  1986    DUPUYTREN CONTRACTURE RELEASE Bilateral 2017    ESOPHAGOGASTRODUODENOSCOPY N/A 2024    Procedure: EGD (ESOPHAGOGASTRODUODENOSCOPY);  Surgeon: Yamilet Walters MD;  Location: [facility] ENDO (2ND FLR);  Service: Gastroenterology;  Laterality: N/A;    ESOPHAGOGASTRODUODENOSCOPY N/A 2024    Procedure: EGD (ESOPHAGOGASTRODUODENOSCOPY);  Surgeon: Yamilet Walters MD;  Location: [facility] ENDO (2ND FLR);  Service: Gastroenterology;  Laterality: N/A;    HYSTERECTOMY      BEAU w/ appy, no BSO     IMPLANTATION OF LEADLESS PACEMAKER N/A 2024    Procedure: INSERTION, CARDIAC PACEMAKER, LEADLESS;  Surgeon: LENIN Frances MD;  Location: [facility] EP LAB;  Service: Cardiology;  Laterality: N/A;  SB, LEADLESS PPM  SAM (MICRA), ANES, EH, CVICU [id number]    INJECTION OF NEUROLYTIC AGENT AROUND LUMBAR SYMPATHETIC NERVE N/A 1/6/2022    Procedure: BLOCK, LUMBAR SYMPATHETIC;  Surgeon: Souleymane Rizo Jr., MD;  Location: [facility] PAIN MGT;  Service: Pain Management;  Laterality: N/A;  no pacemaker   pt is diabetic     INJECTION OF NEUROLYTIC AGENT AROUND LUMBAR SYMPATHETIC NERVE N/A 8/25/2022    Procedure: BLOCK, LUMBAR SYMPATHETIC;  Surgeon: Souleymane Rizo Jr., MD;  Location: [facility] PAIN MGT;  Service: Pain Management;  Laterality: N/A;  diabetic     INSERTION OF TUNNELED CENTRAL VENOUS HEMODIALYSIS CATHETER Right 1/25/2023    Procedure: INSERTION, CATHETER, HEMODIALYSIS, DUAL LUMEN;  Surgeon: Manuel Javier MD;  Location: [facility] OR;  Service: General;  Laterality: Right;    INSERTION, CATHETER, TUNNELED Left 1/28/2023    Procedure: Insertion,catheter,tunneled;  Surgeon: Watson Fontenot MD;  Location: [facility] OR;  Service: General;  Laterality: Left;    LAPAROTOMY, EXPLORATORY N/A 1/14/2023    Procedure: LAPAROTOMY, EXPLORATORY;  Surgeon: Manuel Javier MD;  Location: [facility] OR;  Service: General;  Laterality: N/A;    LAPAROTOMY, EXPLORATORY N/A 1/16/2023    Procedure: LAPAROTOMY, EXPLORATORY;  Surgeon: Manuel Javier MD;  Location: [facility] OR;  Service: General;  Laterality: N/A;    LEFT HEART CATHETERIZATION Left 2/21/2024    Procedure: Left heart cath;  Surgeon: Todd Carlisle MD;  Location: [facility] CATH LAB;  Service: Cardiology;  Laterality: Left;    LYMPHADENECTOMY Right 1/2/2024    Procedure: LYMPHADENECTOMY;  Surgeon: Tan Thomson MD;  [address]  Service: Cardiothoracic;  Laterality: Right;    PACEMAKER, TEMPORARY, TRANSVENOUS, PEDIATRIC Right 1/12/2024    Procedure: Pacemaker, Temporary, Transvenous;  Surgeon: Todd Carlisle MD;  Location: [facility] CATH LAB;  Service: Cardiology;  Laterality: Right;    REMOVAL OF CATHETER Right 1/28/2023    Procedure: REMOVAL, CATHETER;  Surgeon: Watson CORNEJO  MD Corie;  Location: [facility] OR;  Service: General;  Laterality: Right;    REMOVAL, TUNNELED CATH Right 1/25/2023    Procedure: REMOVAL, TUNNELED CATH;  Surgeon: Manuel Javier MD;  Location: [facility] OR;  Service: General;  Laterality: Right;    ROBOTIC BRONCHOSCOPY N/A 10/20/2023    Procedure: ROBOTIC BRONCHOSCOPY;  Surgeon: Mayda Monzon MD;  [facility];  Service: Pulmonary;  Laterality: N/A;    SHOULDER SURGERY  2009 & 2010    right rotator cuff    THORACOTOMY Right 1/2/2024    Procedure: THORACOTOMY;  Surgeon: Tan Thomson MD;  Location: [facility] OR [facility] FLR;  Service: Cardiothoracic;  Laterality: Right;    THORACOTOMY, WITH INITIAL THERAPEUTIC WEDGE RESECTION OF LUNG Right 1/2/2024    Procedure: THORACOTOMY, WITH INITIAL THERAPEUTIC WEDGE RESECTION OF LUNG;  Surgeon: Tan Thomson MD;  [facility];  Service: Cardiothoracic;  Laterality: Right;    TONSILLECTOMY      UPPER GASTROINTESTINAL ENDOSCOPY  04/2018       Review of patient's allergies indicates:   Allergen Reactions    Crestor [rosuvastatin] Swelling    Gluten protein        No current facility-administered medications on file prior to encounter.     Current Outpatient Medications on File Prior to Encounter   Medication Sig    B complex-vitamin C-folic acid (MELLISSA-BENJY) 0.8 mg Tab Take 1 tablet (0.8 mg total) by mouth once daily.    insulin aspart U-100 (NOVOLOG) 100 unit/mL (3 mL) InPn pen Inject before meals:  150-200=+1, 201-250=+2, 251-300=+3; 301-350=+4, over 350=+5 units    meclizine (ANTIVERT) 25 mg tablet Take 1 tablet (25 mg total) by mouth 3 (three) times daily as needed for Dizziness.    midodrine (PROAMATINE) 10 MG tablet Take 1 tablet (10 mg total) by mouth 2 (two) times daily with meals.    pantoprazole (PROTONIX) 40 MG tablet Take 1 tablet (40 mg total) by mouth 2 (two) times daily before meals.    patiromer calcium sorbitex (VELTASSA) 8.4 gram PwPk Take 2 packets (16.8 g total) by mouth once daily.     "sevelamer HCL (RENAGEL) 800 MG Tab Take 2 tablets (1,600 mg total) by mouth 3 (three) times daily with meals.    torsemide (DEMADEX) 20 MG Tab Take 20 mg by mouth 2 (two) times daily.    venlafaxine (EFFEXOR) 37.5 MG Tab Take 1 tablet (37.5 mg total) by mouth once daily.    betahistine HCl (BETAHISTINE, BULK, MISC)     blood sugar diagnostic (TRUE METRIX GLUCOSE TEST STRIP) Strp USE ONE STRIP FOR TESTING 2 TIMES A DAY    blood-glucose meter kit Use to test twice a day    calcium-vitamin D3 (OS-CIPRIANO 500 + D3) 500 mg-5 mcg (200 unit) per tablet Take 1 tablet by mouth once daily.    ferrous gluconate (FERGON) 324 MG tablet Take 1 tablet (324 mg total) by mouth daily with breakfast.    lancets Misc 1 lancet by Misc.(Non-Drug; Combo Route) route 4 (four) times daily.    nutritional supplements Liqd Take 237 mLs by mouth 4 (four) times daily.    pen needle, diabetic (BD ULTRA-FINE MARIS PEN NEEDLE) 32 gauge x 5/32" Ndle 1 Device by Misc.(Non-Drug; Combo Route) route 4 (four) times daily with meals and nightly.    vit C,T-Fo-humwi-lutein-zeaxan (PRESERVISION AREDS 2) 249-667-67-1 mg-unit-mg-mg Cap      Family History       Problem Relation (Age of Onset)    Arthritis Father    Breast cancer Mother, Paternal Aunt    Cancer Mother, Paternal Aunt    Diabetes Paternal Grandfather    Hyperlipidemia Sister    Vision loss Father, Mother, Sister          Tobacco Use    Smoking status: Never    Smokeless tobacco: Never   Substance and Sexual Activity    Alcohol use: No    Drug use: Never    Sexual activity: Not Currently     Partners: Male     Birth control/protection: See Surgical Hx     Comment: :      Review of Systems   Constitutional: Negative for chills and fever.   Cardiovascular:  Positive for dyspnea on exertion, leg swelling and orthopnea. Negative for chest pain and syncope.   Respiratory:  Positive for shortness of breath and wheezing.      Objective:     Vital Signs (Most Recent):  Temp: 96.7 °F (35.9 °C) " (02/23/24 1211)  Pulse: 93 (02/23/24 1645)  Resp: 18 (02/23/24 1211)  BP: (!) 151/65 (02/23/24 1645)  SpO2: 100 % (02/23/24 1645) Vital Signs (24h Range):  Temp:  [96.7 °F (35.9 °C)-98 °F (36.7 °C)] 96.7 °F (35.9 °C)  Pulse:  [76-94] 93  Resp:  [18-20] 18  SpO2:  [98 %-100 %] 100 %  BP: (124-159)/(60-77) 151/65     Weight: 67.6 kg (149 lb 0.5 oz)  Body mass index is 24.8 kg/m².    SpO2: 100 %         Intake/Output Summary (Last 24 hours) at 2/23/2024 1705  Last data filed at 2/23/2024 1636  Gross per 24 hour   Intake --   Output 0 ml   Net 0 ml       Lines/Drains/Airways       Central Venous Catheter Line  Duration             Permacath right subclavian -- days         Hemodialysis Catheter 01/25/23 1501 right internal jugular 394 days              Peripheral Intravenous Line  Duration                  Hemodialysis AV Fistula 09/01/23 0800 Left forearm 175 days         Peripheral IV - Single Lumen 02/21/24 1425 18 G Right Antecubital 2 days         Peripheral IV - Single Lumen 02/21/24 1440 22 G Anterior;Left Wrist 2 days                     Physical Exam  Constitutional:       Appearance: Normal appearance. She is ill-appearing.   HENT:      Head: Normocephalic and atraumatic.   Cardiovascular:      Rate and Rhythm: Normal rate and regular rhythm.      Pulses: Normal pulses.      Heart sounds: Normal heart sounds.   Pulmonary:      Effort: Respiratory distress present.      Comments: NC in place  Abdominal:      General: Bowel sounds are normal. There is no distension.      Palpations: Abdomen is soft.   Skin:     General: Skin is warm.   Neurological:      General: No focal deficit present.      Mental Status: She is alert and oriented to person, place, and time.          Significant Labs: All pertinent lab results from the last 24 hours have been reviewed.

## 2024-02-23 NOTE — ASSESSMENT & PLAN NOTE
ESRD   Hypoxic resp failure - improved with 1.5 liter removal;   ECHO 2/12 with newly depressed EF of 10-15% from 60-65% one month prior    Plan/Recommendation    -Plan for HD today, UF goal 2-3 as tolerated   -Keep MAP > 65  -Keep hemoglobin > 7  -Strict ins and outs  -Avoid nephrotoxic agents if possible and renally dose medications  -Avoid drastic hemodynamic changes if possible  -1L fluid restrictions  -protein supplements with meals

## 2024-02-23 NOTE — PROGRESS NOTES
[facility] - Telemetry Stepdown  Nephrology  Progress Note    Patient Name: Lily Green  MRN: [id number]  Admission Date: 2/12/2024  Hospital Length of Stay: 11 days  Attending Provider: Patrick Guardado MD   Primary Care Physician: Pavel Calixto MD  Principal Problem:Acute hypoxemic respiratory failure    Subjective:     Interval History: HD today.     Review of patient's allergies indicates:   Allergen Reactions    Crestor [rosuvastatin] Swelling    Gluten protein      Current Facility-Administered Medications   Medication Frequency    0.9%  NaCl infusion Once    0.9%  NaCl infusion Once    acetaminophen tablet 650 mg Q4H PRN    acetaminophen tablet 650 mg Q4H PRN    albuterol-ipratropium 2.5 mg-0.5 mg/3 mL nebulizer solution 3 mL Q4H PRN    amiodarone tablet 400 mg BID    Followed by    [START ON 2/25/2024] amiodarone tablet 200 mg Daily    aspirin EC tablet 81 mg Daily    atorvastatin tablet 40 mg QHS    dextrose 10% bolus 125 mL 125 mL PRN    dextrose 10% bolus 250 mL 250 mL PRN    droNABinol capsule 5 mg BID AC    epoetin noble injection 3,400 Units Every Mon, Wed, Fri    ferrous gluconate tablet 324 mg Daily with breakfast    glucagon (human recombinant) injection 1 mg PRN    glucose chewable tablet 16 g PRN    glucose chewable tablet 24 g PRN    heparin (porcine) injection 5,000 Units Q8H    insulin aspart U-100 pen 0-10 Units QID (AC + HS) PRN    melatonin tablet 6 mg Nightly PRN    NIFEdipine 24 hr tablet 30 mg Daily    ondansetron disintegrating tablet 8 mg Q8H PRN    ondansetron injection 4 mg Q6H PRN    pantoprazole EC tablet 40 mg BID AC    pregabalin capsule 75 mg Every other day    sodium chloride 0.9% flush 10 mL PRN    sodium chloride 0.9% flush 10 mL PRN    venlafaxine tablet 37.5 mg Daily       Objective:     Vital Signs (Most Recent):  Temp: 96.7 °F (35.9 °C) (02/23/24 1211)  Pulse: 82 (02/23/24 1211)  Resp: 18 (02/23/24 1211)  BP: 124/77 (02/23/24 1211)  SpO2: 100 % (02/23/24 1211)  Vital Signs (24h Range):  Temp:  [96.7 °F (35.9 °C)-98 °F (36.7 °C)] 96.7 °F (35.9 °C)  Pulse:  [76-88] 82  Resp:  [18-20] 18  SpO2:  [98 %-100 %] 100 %  BP: (121-132)/(60-80) 124/77     Weight: 67.6 kg (149 lb 0.5 oz) (02/20/24 1018)  Body mass index is 24.8 kg/m².  Body surface area is 1.76 meters squared.    I/O last 3 completed shifts:  In: 650 [P.O.:50; Other:600]  Out: 3600 [Other:3600]     Physical Exam  Vitals and nursing note reviewed.   Constitutional:       General: She is not in acute distress.     Appearance: She is normal weight. She is not ill-appearing, toxic-appearing or diaphoretic.      Comments: Appears frail   HENT:      Head: Normocephalic and atraumatic.   Cardiovascular:      Rate and Rhythm: Rhythm irregular.      Pulses: Normal pulses.      Heart sounds: Normal heart sounds. No murmur heard.     No friction rub. No gallop.   Pulmonary:      Comments: Moderately increased respiratory effort with bilateral crackles  Abdominal:      General: Abdomen is flat. Bowel sounds are normal. There is no distension.      Palpations: Abdomen is soft.      Tenderness: There is no abdominal tenderness. There is no guarding or rebound.   Musculoskeletal:         General: No swelling or tenderness.   Skin:     General: Skin is warm and dry.      Coloration: Skin is not jaundiced or pale.          Significant Labs:  CBC:   Recent Labs   Lab 02/23/24  0350   WBC 9.41   RBC 2.49*   HGB 7.7*   HCT 23.7*      MCV 95   MCH 30.9   MCHC 32.5     CMP:   Recent Labs   Lab 02/23/24  0350   *   CALCIUM 8.9   ALBUMIN 1.9*   PROT 6.0   *   K 3.6   CO2 24   CL 97   BUN 33*   CREATININE 2.6*   ALKPHOS 86   ALT 11   AST 18   BILITOT 0.3     All labs within the past 24 hours have been reviewed.         Assessment/Plan:     Pulmonary  * Acute hypoxemic respiratory failure  UF with RRT    Renal/  ESRD (end stage renal disease)  ESRD   Hypoxic resp failure - improved with 1.5 liter removal;   ECHO 2/12 with  newly depressed EF of 10-15% from 60-65% one month prior    Plan/Recommendation    -Plan for HD today, UF goal 2-3 as tolerated   -Keep MAP > 65  -Keep hemoglobin > 7  -Strict ins and outs  -Avoid nephrotoxic agents if possible and renally dose medications  -Avoid drastic hemodynamic changes if possible  -1L fluid restrictions  -protein supplements with meals        Thank you for your consult. I will follow-up with patient. Please contact us if you have any additional questions.    Christy Gaytan DNP  Nephrology  [facility] - Telemetry Stepdown

## 2024-02-23 NOTE — SUBJECTIVE & OBJECTIVE
Interval History: HD today.     Review of patient's allergies indicates:   Allergen Reactions    Crestor [rosuvastatin] Swelling    Gluten protein      Current Facility-Administered Medications   Medication Frequency    0.9%  NaCl infusion Once    0.9%  NaCl infusion Once    acetaminophen tablet 650 mg Q4H PRN    acetaminophen tablet 650 mg Q4H PRN    albuterol-ipratropium 2.5 mg-0.5 mg/3 mL nebulizer solution 3 mL Q4H PRN    amiodarone tablet 400 mg BID    Followed by    [START ON 2/25/2024] amiodarone tablet 200 mg Daily    aspirin EC tablet 81 mg Daily    atorvastatin tablet 40 mg QHS    dextrose 10% bolus 125 mL 125 mL PRN    dextrose 10% bolus 250 mL 250 mL PRN    droNABinol capsule 5 mg BID AC    epoetin noble injection 3,400 Units Every Mon, Wed, Fri    ferrous gluconate tablet 324 mg Daily with breakfast    glucagon (human recombinant) injection 1 mg PRN    glucose chewable tablet 16 g PRN    glucose chewable tablet 24 g PRN    heparin (porcine) injection 5,000 Units Q8H    insulin aspart U-100 pen 0-10 Units QID (AC + HS) PRN    melatonin tablet 6 mg Nightly PRN    NIFEdipine 24 hr tablet 30 mg Daily    ondansetron disintegrating tablet 8 mg Q8H PRN    ondansetron injection 4 mg Q6H PRN    pantoprazole EC tablet 40 mg BID AC    pregabalin capsule 75 mg Every other day    sodium chloride 0.9% flush 10 mL PRN    sodium chloride 0.9% flush 10 mL PRN    venlafaxine tablet 37.5 mg Daily       Objective:     Vital Signs (Most Recent):  Temp: 96.7 °F (35.9 °C) (02/23/24 1211)  Pulse: 82 (02/23/24 1211)  Resp: 18 (02/23/24 1211)  BP: 124/77 (02/23/24 1211)  SpO2: 100 % (02/23/24 1211) Vital Signs (24h Range):  Temp:  [96.7 °F (35.9 °C)-98 °F (36.7 °C)] 96.7 °F (35.9 °C)  Pulse:  [76-88] 82  Resp:  [18-20] 18  SpO2:  [98 %-100 %] 100 %  BP: (121-132)/(60-80) 124/77     Weight: 67.6 kg (149 lb 0.5 oz) (02/20/24 1018)  Body mass index is 24.8 kg/m².  Body surface area is 1.76 meters squared.    I/O last 3 completed  shifts:  In: 650 [P.O.:50; Other:600]  Out: 3600 [Other:3600]     Physical Exam  Vitals and nursing note reviewed.   Constitutional:       General: She is not in acute distress.     Appearance: She is normal weight. She is not ill-appearing, toxic-appearing or diaphoretic.      Comments: Appears frail   HENT:      Head: Normocephalic and atraumatic.   Cardiovascular:      Rate and Rhythm: Rhythm irregular.      Pulses: Normal pulses.      Heart sounds: Normal heart sounds. No murmur heard.     No friction rub. No gallop.   Pulmonary:      Comments: Moderately increased respiratory effort with bilateral crackles  Abdominal:      General: Abdomen is flat. Bowel sounds are normal. There is no distension.      Palpations: Abdomen is soft.      Tenderness: There is no abdominal tenderness. There is no guarding or rebound.   Musculoskeletal:         General: No swelling or tenderness.   Skin:     General: Skin is warm and dry.      Coloration: Skin is not jaundiced or pale.          Significant Labs:  CBC:   Recent Labs   Lab 02/23/24  0350   WBC 9.41   RBC 2.49*   HGB 7.7*   HCT 23.7*      MCV 95   MCH 30.9   MCHC 32.5     CMP:   Recent Labs   Lab 02/23/24  0350   *   CALCIUM 8.9   ALBUMIN 1.9*   PROT 6.0   *   K 3.6   CO2 24   CL 97   BUN 33*   CREATININE 2.6*   ALKPHOS 86   ALT 11   AST 18   BILITOT 0.3     All labs within the past 24 hours have been reviewed.

## 2024-02-23 NOTE — NURSING
Report received from AYLEEN Gant RN.     Patient arrived to [facility] via stretcher. AAOx4.     HD tx initiated via Right tunneled catheter.

## 2024-02-23 NOTE — PROGRESS NOTES
[facility] - Telemetry [facility] Medicine  Progress Note    Patient Name: Lily Green  MRN: [id number]  Patient Class: IP- Inpatient   Admission Date: 2/12/2024  Length of Stay: 11 days  Attending Physician: Patrick Gaurdado MD  Primary Care Provider: Pavel Calixto MD        Subjective:     Principal Problem:Acute hypoxemic respiratory failure        HPI:  Lily Green is a 73 year old white woman with celiac disease, diabetes mellitus type 2 with polyneuropathy, hypertension, hyperlipidemia, history of peptic ulcer disease, depression, anxiety, history of hysterectomy in 1987, history of peritonitis due to diffuse colonic ischemia status post total colectomy and end ileostomy creation on 1/14/2023, evacuation of intraabdominal hematoma, takedown of left lateral ileostomy and recreation of right lateral end ileostomy on 1/16/2023, elective robotic ileostomy reversal and ileocolonic anastomosis on 8/7/2023, irritable bowel syndrome, end stage renal disease status post right internal jugular permanent dialysis catheter on 1/25/2023 removed on 1/28/2023 and replaced with left internal jugular permanent dialysis catheter (on hemodialysis Tuesday Thursday Saturday), right middle lobe carcinoid tumor status post robotic-assisted converted to right thoracotomy with therapeutic wedge resection and mediastinal lymph node dissection and repair of bleeding right inferior pulmonary vein on 1/2/2024, atrial fibrillation, Medtronic Micra AV leadless cardiac pacemaker placement on 1/16/2024. She lives in [place], [place]. She is . Her primary care physician is Dr. Pavel Calixto.               She was hospitalized at [facility] from 1/2/2024 to 2/6/2024 for right middle lobe carcinoid tumor, parapneumonic effusion, atrial fibrillation with rapid ventricular response, tachy-eulogio syndrome, gastrointestinal bleeding while on heparin. She was discharged to [facility]  [facility] nursing [facility]. She was prescribed cefpodoxime and metronidazole for 13 days, fluconazole for 7 days, metoprolol tartrate, and venlafaxine.               She presented to [facility] Emergency Department on 2/12/2024 from the [facility] nursing [facility] with increased work of breathing, dyspnea, and hypoxia with oxygen saturation of 70% on 2 liters/minute of supplemental oxygen. It occurred acutely on the day of presentation. Emergency medical services put her on CPAP with some improvement. She was put on BiPAP in the emergency department. She had tachycardia and tachypnea. Her last dialysis session was 2 days prior on Saturday. She reported cough and nausea. Labs showed BNP 2754 pg/mL, troponin 0.312 ng/mL. Venous blood gas showed pH 7.438, pCO2 38.9. EKG showed sinus tachycardia. Chest X-ray showed pleural effusions and possible multifocal pneumonia. Nephrology was consulted for emergent dialysis with goal of removing 3 liters. She was admitted to Critical Care Medicine.    Overview/Hospital Course:  Echocardiogram revealed new low left ventricular ejection fraction. Troponins remained elevated. Cardiology was consulted. She developed atrial fibrillation with rapid ventricular response on 2/13/2024. She was started on amiodarone infusion. She underwent slow low-efficiency dialysis (SLED). CT showed fluid collections in the right paraesophageal space and azygoesophageal recess. Cardiothoracic Surgery was consulted and recommended Interventional Radiology consult for drainage, but there was no safe window for approach. Interventional Cardiology deferred angiography due to respiratory failure. Palliative Care was consulted. Dialysis removed further fluid. Metoprolol was increased. Thoracentesis was performed on 2/15/2024 removing 1.45 liters, which was sent for analysis. She developed re-expansion pulmonary edema and was started on Airvo high flow oxygen. Airvo was weaned  down to 50 liters/minute and FiO2 40% on 2/16/2024. Pleural effusion was transudative. She was started on vasopressor support. She was weaned to high flow nasal cannula at 12 liters/minute on 2/17/2024. She tolerated SLED without norepinephrine. She was weaned to 4 liters/minute on 2/18/2024. She was transferred to [facility] Medicine Team W on 2/20/2024. She was started on nifedipine. She worked with Physical and Occupational Therapy. Interventional Cardiology was reconsulted. Left heart catheterization showed luminal irregularities. Interventional Cardiology recommended starting aspirin 81 mg daily and atorvastatin 10 mg daily and consulting Heart Transplant Service (HTS) for management of nonischemic cardiomyopathy. She was already on atorvastatin 40 mg daily. Palliative Care signed off and planned to see her outpatient. She continued to require 4 liters/minute of supplemental oxygen. She was still short of breath. HTS was consulted and they said General Cardiology could be consulted.    Interval History: Still on 4 liters/minute of O2. Appears short of breath and physically weak. Going to dialysis. I spoke to Cardiology about a consult.     Review of Systems   Constitutional:  Negative for chills and fever.   Respiratory:  Positive for shortness of breath. Negative for cough.    Gastrointestinal:  Negative for blood in stool.   Neurological:  Negative for syncope and headaches.     Objective:     Vital Signs (Most Recent):  Temp: 97.6 °F (36.4 °C) (02/22/24 1123)  Pulse: 88 (02/22/24 1200)  Resp: (!) 22 (02/22/24 1200)  BP: 131/81 (02/22/24 1123)  SpO2: 97 % (02/22/24 1200) Vital Signs (24h Range):  Temp:  [96.4 °F (35.8 °C)-98.6 °F (37 °C)] 97.6 °F (36.4 °C)  Pulse:  [] 88  Resp:  [16-22] 22  SpO2:  [92 %-100 %] 97 %  BP: (102-145)/(54-81) 131/81     Weight: 67.6 kg (149 lb 0.5 oz)  Body mass index is 24.8 kg/m².    Intake/Output Summary (Last 24 hours) at 2/22/2024 1401  Last data filed at 2/22/2024  0100  Gross per 24 hour   Intake 600 ml   Output 3600 ml   Net -3000 ml           Physical Exam  Vitals and nursing note reviewed.   Constitutional:       General: She is not in acute distress.     Appearance: She is not diaphoretic.      Interventions: She is not intubated.Nasal cannula in place.   Pulmonary:      Effort: Pulmonary effort is normal. Tachypnea present. No respiratory distress. She is not intubated.   Musculoskeletal:         General: No swelling or tenderness.      Right lower leg: No edema.      Left lower leg: No edema.   Skin:     General: Skin is warm and dry.      Coloration: Skin is not jaundiced.   Neurological:      Mental Status: She is alert and oriented to person, place, and time. Mental status is at baseline.      Motor: No seizure activity.   Psychiatric:         Attention and Perception: Attention normal.         Behavior: Behavior normal. Behavior is not aggressive or combative.             Significant Labs: All pertinent labs within the past 24 hours have been reviewed.    Significant Imaging: I have reviewed all pertinent imaging results/findings within the past 24 hours.    Assessment/Plan:      * Acute hypoxemic respiratory failure  Wean oxygen as tolerated. Still requiring 4 liters/minute. Dialysis for fluid removal.    Moderate malnutrition  Nutrition consulted. Most recent weight and BMI monitored-     Measurements:  Wt Readings from Last 1 Encounters:   02/20/24 67.6 kg (149 lb 0.5 oz)   Body mass index is 24.8 kg/m².    Patient has been screened and assessed by RD.    Malnutrition Type:  Context: chronic illness  Level: moderate    Malnutrition Characteristic Summary:  Energy Intake (Malnutrition): less than 75% for greater than or equal to 1 month  Muscle Mass (Malnutrition): mild depletion  Fluid Accumulation (Malnutrition): severe    Interventions/Recommendations (treatment strategy):  1.Continue gluten free diet with texture per SLP 2. [facility]  3. RD to monitor and follow       Atrial flutter  Started amiodarone. Anticoagulation contraindicated.      Acute decompensated heart failure  LHC done. Nonischemic cardiomyopathy. Consult Cardiology for management recommendations.    Secondary hyperparathyroidism  Stable.      Pericardial effusion  Treating fluid overload      Chronic kidney disease-mineral and bone disorder  Continue home sevelamer.    Gastric ulcer  Prescribed PPI to take until 2/23/2024.    Pleural effusion on left  Treating fluid overload.        Atrial fibrillation  Chronic. Home metoprolol changed to amiodarone.    ESRD (end stage renal disease)  Nephrology following.    Anemia in ESRD (end-stage renal disease)  Patient's anemia is currently controlled. Has not received any PRBCs to date. Etiology likely d/t chronic disease due to Chronic Kidney Disease/ESRD  Current CBC reviewed-   Lab Results   Component Value Date    HGB 7.9 (L) 02/21/2024    HCT 25.7 (L) 02/21/2024     Monitor serial CBC and transfuse if patient becomes hemodynamically unstable, symptomatic or H/H drops below 7/21.    Generalized anxiety disorder  Continue home venlafaxine       Hyperlipidemia, mixed  Continue home atorvastatin.      Type 2 diabetes mellitus with diabetic polyneuropathy, without long-term current use of insulin  Patient's FSGs are controlled on current medication regimen.  Last A1c reviewed-   Lab Results   Component Value Date    HGBA1C 4.9 11/15/2023     Most recent fingerstick glucose reviewed-   Recent Labs   Lab 02/20/24  1713 02/20/24  2245 02/21/24  0937   POCTGLUCOSE 214* 220* 189*       Current correctional scale  Low  Maintain anti-hyperglycemic dose as follows-   Antihyperglycemics (From admission, onward)      Start     Stop Route Frequency Ordered    02/15/24 1054  insulin aspart U-100 pen 0-10 Units         -- SubQ Before meals & nightly PRN 02/15/24 1055          Hold Oral hypoglycemics while patient is in the hospital.      VTE Risk  Mitigation (From admission, onward)           Ordered     heparin (porcine) injection 5,000 Units  Every 8 hours         02/12/24 0416     IP VTE HIGH RISK PATIENT  Once         02/12/24 0416     Place sequential compression device  Until discontinued         02/12/24 0416     heparin (porcine) injection 1,000 Units  As needed (PRN)         02/12/24 0229                    Discharge Planning   CAMPBELL: 2/26/2024     Code Status: Full Code   Is the patient medically ready for discharge?: No    Reason for patient still in hospital (select all that apply): Patient unstable, Patient trending condition, Treatment, and Consult recommendations  Discharge Plan A: Skilled Nursing Facility   Discharge Delays: None known at this time              Patrick Guardado MD  Department of Hospital Medicine   [facility] Tele[facility] Stepdown

## 2024-02-23 NOTE — PT/OT/SLP PROGRESS
"Physical Therapy Co-Treatment    Patient Name:  Lily Green   MRN:  [id number]    Recommendations:     Discharge Recommendations: Moderate Intensity Therapy  Discharge Equipment Recommendations: wheelchair  Barriers to discharge:  patient requiring significant assistance    Assessment:     Lily Green is a 73 y.o. female admitted with a medical diagnosis of Acute hypoxemic respiratory failure.  She presents with the following impairments/functional limitations: weakness, impaired endurance, impaired functional mobility, gait instability, impaired balance, decreased lower extremity function, decreased upper extremity function, impaired cardiopulmonary response to activity Patient pleasantly agreeable to treatment session today. Patient frequently displaying SOB throughout session and pursed lip breathing techniques implemented throughout. Completed 3 STS trials and one transfer to bedside commode. Will continue to benefit from skilled PT during this admit and following discharge to improve functional mobility.    Rehab Prognosis: Good; patient would benefit from acute skilled PT services to address these deficits and reach maximum level of function.    Recent Surgery: Procedure(s) (LRB):  ANGIOGRAM, CORONARY ARTERY (N/A)  Left heart cath (Left) 2 Days Post-Op    Plan:     During this hospitalization, patient to be seen 3 x/week to address the identified rehab impairments via gait training, therapeutic activities, therapeutic exercises, neuromuscular re-education and progress toward the following goals:    Plan of Care Expires:  03/16/24    Subjective     Chief Complaint: "We have to wipe my butt fast, I need to sit down"  Patient/Family Comments/goals: gain strength  Pain/Comfort:  Pain Rating 1: 0/10  Pain Rating Post-Intervention 1: 0/10      Objective:     Communicated with RN prior to session.  Patient found up in chair with oxygen, telemetry upon PT entry to room.     General Precautions: " Standard, fall  Orthopedic Precautions: N/A  Braces: N/A  Respiratory Status: Nasal cannula, flow 3 L/min     Functional Mobility:  Transfers:     Sit to Stand:  mod x2 with HHA from chair  3 trials, verbal cues required for sequencing and hand placement  Each trial <1 min   Toilet Transfer: Stand Pivot technique with maximal assistance with  hand-held assist and from chair>BSC d/t continuous BM      AM-PAC 6 CLICK MOBILITY  Turning over in bed (including adjusting bedclothes, sheets and blankets)?: 3  Sitting down on and standing up from a chair with arms (e.g., wheelchair, bedside commode, etc.): 2  Moving from lying on back to sitting on the side of the bed?: 2  Moving to and from a bed to a chair (including a wheelchair)?: 2  Need to walk in hospital room?: 2  Climbing 3-5 steps with a railing?: 1  Basic Mobility Total Score: 12       Treatment & Education:  Patient and  educated on importance of participation with therapy, pursed lip breathing and OOB mobility  Standing trials implemented while OT completes malini-care with total A  Pre-gait activities implemented while patient in standing, emphasis on weight shifting and floor clearance  Patient and  educated to call for assistance as needed for fall prevention    Patient left  on BSC with RN/PCT notified  with all lines intact, call button in reach, and sister and  present.    GOALS:   Multidisciplinary Problems       Physical Therapy Goals          Problem: Physical Therapy    Goal Priority Disciplines Outcome Goal Variances Interventions   Physical Therapy Goal     PT, PT/OT Ongoing, Progressing     Description: Goals to be met by: 3/16/24     Patient will increase functional independence with mobility by performin. Supine to sit with Contact Guard Assistance  2. Sit to supine with Contact Guard Assistance  3. Sit to stand transfer with Contact Guard Assistance  4. Bed to chair transfer with Contact Guard Assistance using Rolling  Walker  5. Gait  x 50 feet with Contact Guard Assistance using Rolling Walker.                          Time Tracking:     PT Received On: 02/23/24  PT Start Time: 0958     PT Stop Time: 1023  PT Total Time (min): 25 min     Billable Minutes: Gait Training 16 and Therapeutic Activity 10  co-treatment performed with OT due to low activity tolerance; level of assistance required for mobility and skilled treatment   Treatment Type: Treatment  PT/PTA: PT     Number of PTA visits since last PT visit: 0     02/23/2024

## 2024-02-23 NOTE — ASSESSMENT & PLAN NOTE
Patient was started on Amio gtt and Amio load in the setting of A fib/flutter. Patient converted to NSR some time between 2/12/24 and 2/14/24. Patient currently denies palpitations, CP, but does still endorse SOB. Patient rate is controlled and she is maintaining a normal rhythm. Patient AC is held in the setting of reported GIB when AC was started during previous hospitalization. Patient currently on PO Amio load 400 mg BID dosing.     Recommendation:   - Continue Amiodarone dosing 400 mg BID 10 day dosing as is, then continue 200 mg daily   - Follow up with cardiology on outpatient basis

## 2024-02-23 NOTE — PLAN OF CARE
Problem: Device-Related Complication Risk (Hemodialysis)  Goal: Safe, Effective Therapy Delivery  Outcome: Ongoing, Progressing     Problem: Hemodynamic Instability (Hemodialysis)  Goal: Effective Tissue Perfusion  Outcome: Ongoing, Progressing     Problem: Infection (Hemodialysis)  Goal: Absence of Infection Signs and Symptoms  Outcome: Ongoing, Progressing     Problem: Adult Inpatient Plan of Care  Goal: Plan of Care Review  Outcome: Ongoing, Progressing  Goal: Patient-Specific Goal (Individualized)  Outcome: Ongoing, Progressing  Goal: Absence of Hospital-Acquired Illness or Injury  Outcome: Ongoing, Progressing  Goal: Optimal Comfort and Wellbeing  Outcome: Ongoing, Progressing  Goal: Readiness for Transition of Care  Outcome: Ongoing, Progressing     Problem: Device-Related Complication Risk (CRRT (Continuous Renal Replacement Therapy))  Goal: Safe, Effective Therapy Delivery  Outcome: Ongoing, Progressing     Problem: Hypothermia (CRRT (Continuous Renal Replacement Therapy))  Goal: Body Temperature Maintained in Desired Range  Outcome: Ongoing, Progressing

## 2024-02-23 NOTE — CONSULTS
[facility] - Telemetry Stepdown  Cardiology  Consult Note    Patient Name: Lily Green  MRN: [id number]  Admission Date: 2/12/2024  Hospital Length of Stay: 11 days  Code Status: Full Code   Attending Provider: Patrick Guardado MD   Consulting Provider: Hakan Baxter DO  Primary Care Physician: Pavel Calixto MD  Principal Problem:Acute hypoxemic respiratory failure    Patient information was obtained from patient, past medical records, and ER records.     Inpatient consult to Cardiology  Consult performed by: Hakan Baxter DO  Consult ordered by: Patrick Guardado MD        Subjective:     Chief Complaint:  SOB     HPI:   73 y.o. 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 Cardiology was re-consulted in the setting of medication recommendations. In short, this patient was recently seen 2/2 to Afib/flutter, increased trop which peaked at 2, and a newly reduced EF. Patient was evaluated by intervention cardiology for potential obstructive CAD and it was found that she had non obstructive CAD and non ischemic cardiomyopathy. She was started on pressors and CRRT in the setting of acute hypoxemic respiratory failure and was stepped down to the floor.       HPI from previous consult note:   73 y.o. 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  She subsequently developed GIB and discovery of nonbleeding gastric ulcers on EGD made full anticoagulation high risk, so it was stopped. She was ultimately Dc'd to [facility] in [place].  Patient states that at rehab she was having dyspnea on exertion but early  this morning she felt acutely short of breath for which he presented to the emergency room.  She was initially placed on BiPAP and renal dialyze her.  Eventually she was placed on 2 L nasal cannula but still very tachypneic on evaluation and very tenuous from respiratory standpoint.  Patient denies having any chest pain, palpitations, orthopnea, presyncope, syncope.  She had an echo that showed a new drop in her EF.  Also her troponins peaked up to 2.  Her initial presenting rhythm was atrial flutter with rapid ventricular rate 2:1 and currently she is in normal sinus rhythm        Past Medical History:   Diagnosis Date    Anxiety     Celiac disease 2018    Celiac disease     Depression     Diabetes mellitus     Family history of breast cancer in mother      at age 68    Hyperlipidemia     Hypertension     Meniere disease     Meniere's disease, unspecified ear     Menopause     Peptic ulcer     Peritonitis 2023    Reflux esophagitis     Urinary tract infection     Vaginal infection     Vaginal Pap smear 2014    Pap/Hpv Negative        Past Surgical History:   Procedure Laterality Date    APPENDECTOMY      BREAST SURGERY      Tags    CARPAL TUNNEL RELEASE Bilateral 2017    ,     CLOSURE, COLOSTOMY Left 2023    Procedure: CLOSURE, COLOSTOMY;  Surgeon: Manuel Javier MD;  Location: [facility] OR;  Service: General;  Laterality: Left;    COLONOSCOPY  2018    Normal  (Cornell Velazco)     COLOSTOMY Right 2023    Procedure: CREATION, COLOSTOMY;  Surgeon: Manuel Javier MD;  Location: [facility] OR;  Service: General;  Laterality: Right;    CORONARY ANGIOGRAPHY N/A 2024    Procedure: ANGIOGRAM, CORONARY ARTERY;  Surgeon: Todd Carlisle MD;  Location: [facility] CATH LAB;  Service: Cardiology;  Laterality: N/A;    DILATION AND CURETTAGE OF UTERUS  1986    DUPUYTREN CONTRACTURE RELEASE Bilateral 2017    ESOPHAGOGASTRODUODENOSCOPY N/A 2024    Procedure: EGD  (ESOPHAGOGASTRODUODENOSCOPY);  Surgeon: Yamilet Walters MD;  Location: [facility] ENDO (2ND FLR);  Service: Gastroenterology;  Laterality: N/A;    ESOPHAGOGASTRODUODENOSCOPY N/A 1/26/2024    Procedure: EGD (ESOPHAGOGASTRODUODENOSCOPY);  Surgeon: Yamilet Walters MD;  Location: [facility] ENDO (2ND FLR);  Service: Gastroenterology;  Laterality: N/A;    HYSTERECTOMY  1987    BEAU w/ appy, no BSO     IMPLANTATION OF LEADLESS PACEMAKER N/A 1/16/2024    Procedure: INSERTION, CARDIAC PACEMAKER, LEADLESS;  Surgeon: LENIN Frances MD;  Location: [facility] EP LAB;  Service: Cardiology;  Laterality: N/A;  SB, LEADLESS PPM (MICRA), MDT, ANES, EH, CVICU [id number]    INJECTION OF NEUROLYTIC AGENT AROUND LUMBAR SYMPATHETIC NERVE N/A 1/6/2022    Procedure: BLOCK, LUMBAR SYMPATHETIC;  Surgeon: Souleymane Rizo Jr., MD;  Location: [facility] PAIN MGT;  Service: Pain Management;  Laterality: N/A;  no pacemaker   pt is diabetic     INJECTION OF NEUROLYTIC AGENT AROUND LUMBAR SYMPATHETIC NERVE N/A 8/25/2022    Procedure: BLOCK, LUMBAR SYMPATHETIC;  Surgeon: Souleymane Rizo Jr., MD;  Location: [facility] PAIN MGT;  Service: Pain Management;  Laterality: N/A;  diabetic     INSERTION OF TUNNELED CENTRAL VENOUS HEMODIALYSIS CATHETER Right 1/25/2023    Procedure: INSERTION, CATHETER, HEMODIALYSIS, DUAL LUMEN;  Surgeon: Manuel Javier MD;  Location: [facility] OR;  Service: General;  Laterality: Right;    INSERTION, CATHETER, TUNNELED Left 1/28/2023    Procedure: Insertion,catheter,tunneled;  Surgeon: Watson Fontenot MD;  Location: [facility] OR;  Service: General;  Laterality: Left;    LAPAROTOMY, EXPLORATORY N/A 1/14/2023    Procedure: LAPAROTOMY, EXPLORATORY;  Surgeon: Manuel Javier MD;  Location: [facility] OR;  Service: General;  Laterality: N/A;    LAPAROTOMY, EXPLORATORY N/A 1/16/2023    Procedure: LAPAROTOMY, EXPLORATORY;  Surgeon: Manuel Javier MD;  Location: [facility] OR;  Service: General;  Laterality: N/A;    LEFT HEART CATHETERIZATION Left 2/21/2024     Procedure: Left heart cath;  Surgeon: Todd Carlisle MD;  Location: [facility] CATH LAB;  Service: Cardiology;  Laterality: Left;    LYMPHADENECTOMY Right 1/2/2024    Procedure: LYMPHADENECTOMY;  Surgeon: Tan Thomson MD;  Location: [facility] OR [facility] FLR;  Service: Cardiothoracic;  Laterality: Right;    PACEMAKER, TEMPORARY, TRANSVENOUS, PEDIATRIC Right 1/12/2024    Procedure: Pacemaker, Temporary, Transvenous;  Surgeon: Todd Carlisle MD;  Location: [facility] CATH LAB;  Service: Cardiology;  Laterality: Right;    REMOVAL OF CATHETER Right 1/28/2023    Procedure: REMOVAL, CATHETER;  Surgeon: Watson Fontenot MD;  Location: [facility] OR;  Service: General;  Laterality: Right;    REMOVAL, TUNNELED CATH Right 1/25/2023    Procedure: REMOVAL, TUNNELED CATH;  Surgeon: Manuel Javier MD;  Location: [facility] OR;  Service: General;  Laterality: Right;    ROBOTIC BRONCHOSCOPY N/A 10/20/2023    Procedure: ROBOTIC BRONCHOSCOPY;  Surgeon: Mayda Monzon MD;  [address]  Service: Pulmonary;  Laterality: N/A;    SHOULDER SURGERY  2009 & 2010    right rotator cuff    THORACOTOMY Right 1/2/2024    Procedure: THORACOTOMY;  Surgeon: Tan Thomson MD;  [address]  Service: Cardiothoracic;  Laterality: Right;    THORACOTOMY, WITH INITIAL THERAPEUTIC WEDGE RESECTION OF LUNG Right 1/2/2024    Procedure: THORACOTOMY, WITH INITIAL THERAPEUTIC WEDGE RESECTION OF LUNG;  Surgeon: Tan Thomson MD;  [address]  Service: Cardiothoracic;  Laterality: Right;    TONSILLECTOMY      UPPER GASTROINTESTINAL ENDOSCOPY  04/2018       Review of patient's allergies indicates:   Allergen Reactions    Crestor [rosuvastatin] Swelling    Gluten protein        No current facility-administered medications on file prior to encounter.     Current Outpatient Medications on File Prior to Encounter   Medication Sig    B complex-vitamin C-folic acid (MELLISSA-BENJY) 0.8 mg Tab Take 1 tablet (0.8 mg total) by mouth  "once daily.    insulin aspart U-100 (NOVOLOG) 100 unit/mL (3 mL) InPn pen Inject before meals:  150-200=+1, 201-250=+2, 251-300=+3; 301-350=+4, over 350=+5 units    meclizine (ANTIVERT) 25 mg tablet Take 1 tablet (25 mg total) by mouth 3 (three) times daily as needed for Dizziness.    midodrine (PROAMATINE) 10 MG tablet Take 1 tablet (10 mg total) by mouth 2 (two) times daily with meals.    pantoprazole (PROTONIX) 40 MG tablet Take 1 tablet (40 mg total) by mouth 2 (two) times daily before meals.    patiromer calcium sorbitex (VELTASSA) 8.4 gram PwPk Take 2 packets (16.8 g total) by mouth once daily.    sevelamer HCL (RENAGEL) 800 MG Tab Take 2 tablets (1,600 mg total) by mouth 3 (three) times daily with meals.    torsemide (DEMADEX) 20 MG Tab Take 20 mg by mouth 2 (two) times daily.    venlafaxine (EFFEXOR) 37.5 MG Tab Take 1 tablet (37.5 mg total) by mouth once daily.    betahistine HCl (BETAHISTINE, BULK, MISC)     blood sugar diagnostic (TRUE METRIX GLUCOSE TEST STRIP) Strp USE ONE STRIP FOR TESTING 2 TIMES A DAY    blood-glucose meter kit Use to test twice a day    calcium-vitamin D3 (OS-CIPRIANO 500 + D3) 500 mg-5 mcg (200 unit) per tablet Take 1 tablet by mouth once daily.    ferrous gluconate (FERGON) 324 MG tablet Take 1 tablet (324 mg total) by mouth daily with breakfast.    lancets Misc 1 lancet by Misc.(Non-Drug; Combo Route) route 4 (four) times daily.    nutritional supplements Liqd Take 237 mLs by mouth 4 (four) times daily.    pen needle, diabetic (BD ULTRA-FINE MARIS PEN NEEDLE) 32 gauge x 5/32" Ndle 1 Device by Misc.(Non-Drug; Combo Route) route 4 (four) times daily with meals and nightly.    vit C,E-Bd-cowag-lutein-zeaxan (PRESERVISION AREDS 2) 892-915-48-1 mg-unit-mg-mg Cap      Family History       Problem Relation (Age of Onset)    Arthritis Father    Breast cancer Mother, Paternal Aunt    Cancer Mother, Paternal Aunt    Diabetes Paternal Grandfather    Hyperlipidemia Sister    Vision loss Father, " Mother, Sister          Tobacco Use    Smoking status: Never    Smokeless tobacco: Never   Substance and Sexual Activity    Alcohol use: No    Drug use: Never    Sexual activity: Not Currently     Partners: Male     Birth control/protection: See Surgical Hx     Comment: :      Review of Systems   Constitutional: Negative for chills and fever.   Cardiovascular:  Positive for dyspnea on exertion, leg swelling and orthopnea. Negative for chest pain and syncope.   Respiratory:  Positive for shortness of breath and wheezing.      Objective:     Vital Signs (Most Recent):  Temp: 96.7 °F (35.9 °C) (02/23/24 1211)  Pulse: 93 (02/23/24 1645)  Resp: 18 (02/23/24 1211)  BP: (!) 151/65 (02/23/24 1645)  SpO2: 100 % (02/23/24 1645) Vital Signs (24h Range):  Temp:  [96.7 °F (35.9 °C)-98 °F (36.7 °C)] 96.7 °F (35.9 °C)  Pulse:  [76-94] 93  Resp:  [18-20] 18  SpO2:  [98 %-100 %] 100 %  BP: (124-159)/(60-77) 151/65     Weight: 67.6 kg (149 lb 0.5 oz)  Body mass index is 24.8 kg/m².    SpO2: 100 %         Intake/Output Summary (Last 24 hours) at 2/23/2024 1705  Last data filed at 2/23/2024 1636  Gross per 24 hour   Intake --   Output 0 ml   Net 0 ml       Lines/Drains/Airways       Central Venous Catheter Line  Duration             Permacath right subclavian -- days         Hemodialysis Catheter 01/25/23 1501 right internal jugular 394 days              Peripheral Intravenous Line  Duration                  Hemodialysis AV Fistula 09/01/23 0800 Left forearm 175 days         Peripheral IV - Single Lumen 02/21/24 1425 18 G Right Antecubital 2 days         Peripheral IV - Single Lumen 02/21/24 1440 22 G Anterior;Left Wrist 2 days                     Physical Exam  Constitutional:       Appearance: Normal appearance. She is ill-appearing.   HENT:      Head: Normocephalic and atraumatic.   Cardiovascular:      Rate and Rhythm: Normal rate and regular rhythm.      Pulses: Normal pulses.      Heart sounds: Normal heart sounds.    Pulmonary:      Effort: Respiratory distress present.      Comments: NC in place  Abdominal:      General: Bowel sounds are normal. There is no distension.      Palpations: Abdomen is soft.   Skin:     General: Skin is warm.   Neurological:      General: No focal deficit present.      Mental Status: She is alert and oriented to person, place, and time.          Significant Labs: All pertinent lab results from the last 24 hours have been reviewed.      Assessment and Plan:     Atrial flutter  Presenting rhythm was atrial flutter with 2:1 conduction with spontaneously converted.    She also has history of atrial fibrillation.  Currently not on anticoagulation because of previous GI bleed in the ulcers on EGD.     Acute decompensated heart failure  Patient presents with acute onset of respiratory failure and was found to have a drop in her EF to 15%.  Presenting rhythm was atrial flutter with 2:1 now she is now sinus tachycardic  Also has elevated troponin that trended up to 2.    Patient was evaluated by IC and it was found that she has non obstructive CAD. Repeat Echo on 2/14/24 revealed:   Patient denies current CP or palpitations. She is being dialyzed TTS. Please continue to dialyze patient to maintain euvolemic status     Results for orders placed during the hospital encounter of 02/12/24    Echo    Interpretation Summary    Left Ventricle: The left ventricle is normal in size. Normal wall thickness. Normal wall motion. There is severely reduced systolic function with a visually estimated ejection fraction of 20 - 25%. There is diastolic dysfunction.    Right Ventricle: Normal right ventricular cavity size. Wall thickness is normal. Right ventricle wall motion has segmental dysfunction with akinesis of the mid to distal free wall. Systolic function is moderately reduced.    Left Atrium: Left atrium is moderately dilated.    Aortic Valve: Aortic valve thickening. There is moderate aortic valve sclerosis. There  is annular calcification present.    IVC/SVC: Normal venous pressure at 3 mmHg.    Pericardium: Left pleural effusion.     Recommendations:   - Start metoprolol tartrate low dose and see how patient tolerates it, switch to succinate prior to discharge for GDMT  - Start low dose Valsartan and see how patient tolerates it, titrate as tolerated  - SGLT2 and MRA contraindicated for GDMT considering patient renal status   - Arrange cardiology follow up on outpatient basis   - Please discontinue Nifedipine     Atrial fibrillation  Patient was started on Amio gtt and Amio load in the setting of A fib/flutter. Patient converted to NSR some time between 2/12/24 and 2/14/24. Patient currently denies palpitations, CP, but does still endorse SOB. Patient rate is controlled and she is maintaining a normal rhythm. Patient AC is held in the setting of reported GIB when AC was started during previous hospitalization. Patient currently on PO Amio load 400 mg BID dosing.     Recommendation:   - Continue Amiodarone dosing 400 mg BID 10 day dosing as is, then continue 200 mg daily   - Follow up with cardiology on outpatient basis         VTE Risk Mitigation (From admission, onward)           Ordered     heparin (porcine) injection 1,000 Units  As needed (PRN)         02/23/24 1521     heparin (porcine) injection 5,000 Units  Every 8 hours         02/12/24 0416     IP VTE HIGH RISK PATIENT  Once         02/12/24 0416     Place sequential compression device  Until discontinued         02/12/24 0416     heparin (porcine) injection 1,000 Units  As needed (PRN)         02/12/24 0229                    Thank you for your consult. I will sign off. Please contact us if you have any additional questions.    Hakan Baxter, DO  Cardiology   [facility] - Telemetry Stepdown

## 2024-02-23 NOTE — ASSESSMENT & PLAN NOTE
Patient presents with acute onset of respiratory failure and was found to have a drop in her EF to 15%.  Presenting rhythm was atrial flutter with 2:1 now she is now sinus tachycardic  Also has elevated troponin that trended up to 2.    Patient was evaluated by IC and it was found that she has non obstructive CAD. Repeat Echo on 2/14/24 revealed:   Patient denies current CP or palpitations. She is being dialyzed TTS. Please continue to dialyze patient to maintain euvolemic status     Results for orders placed during the hospital encounter of 02/12/24    Echo    Interpretation Summary    Left Ventricle: The left ventricle is normal in size. Normal wall thickness. Normal wall motion. There is severely reduced systolic function with a visually estimated ejection fraction of 20 - 25%. There is diastolic dysfunction.    Right Ventricle: Normal right ventricular cavity size. Wall thickness is normal. Right ventricle wall motion has segmental dysfunction with akinesis of the mid to distal free wall. Systolic function is moderately reduced.    Left Atrium: Left atrium is moderately dilated.    Aortic Valve: Aortic valve thickening. There is moderate aortic valve sclerosis. There is annular calcification present.    IVC/SVC: Normal venous pressure at 3 mmHg.    Pericardium: Left pleural effusion.     Recommendations:   - Start metoprolol tartrate low dose and see how patient tolerates it, switch to succinate prior to discharge for GDMT  - Start low dose Valsartan and see how patient tolerates it, titrate as tolerated  - SGLT2 and MRA contraindicated for GDMT considering patient renal status   - Arrange cardiology follow up on outpatient basis   - Please discontinue Nifedipine

## 2024-02-24 LAB
ALBUMIN SERPL BCP-MCNC: 2.1 G/DL (ref 3.5–5.2)
ALP SERPL-CCNC: 95 U/L (ref 55–135)
ALT SERPL W/O P-5'-P-CCNC: 8 U/L (ref 10–44)
ANION GAP SERPL CALC-SCNC: 10 MMOL/L (ref 8–16)
AST SERPL-CCNC: 18 U/L (ref 10–40)
BASOPHILS # BLD AUTO: 0.08 K/UL (ref 0–0.2)
BASOPHILS NFR BLD: 0.8 % (ref 0–1.9)
BILIRUB SERPL-MCNC: 0.3 MG/DL (ref 0.1–1)
BUN SERPL-MCNC: 21 MG/DL (ref 8–23)
CALCIUM SERPL-MCNC: 8.8 MG/DL (ref 8.7–10.5)
CHLORIDE SERPL-SCNC: 97 MMOL/L (ref 95–110)
CO2 SERPL-SCNC: 24 MMOL/L (ref 23–29)
CREAT SERPL-MCNC: 1.8 MG/DL (ref 0.5–1.4)
DIFFERENTIAL METHOD BLD: ABNORMAL
EOSINOPHIL # BLD AUTO: 0 K/UL (ref 0–0.5)
EOSINOPHIL NFR BLD: 0.4 % (ref 0–8)
ERYTHROCYTE [DISTWIDTH] IN BLOOD BY AUTOMATED COUNT: 22.4 % (ref 11.5–14.5)
EST. GFR  (NO RACE VARIABLE): 29.4 ML/MIN/1.73 M^2
GLUCOSE SERPL-MCNC: 187 MG/DL (ref 70–110)
HCT VFR BLD AUTO: 25.6 % (ref 37–48.5)
HGB BLD-MCNC: 8 G/DL (ref 12–16)
IMM GRANULOCYTES # BLD AUTO: 0.44 K/UL (ref 0–0.04)
IMM GRANULOCYTES NFR BLD AUTO: 4.2 % (ref 0–0.5)
LYMPHOCYTES # BLD AUTO: 0.9 K/UL (ref 1–4.8)
LYMPHOCYTES NFR BLD: 8.4 % (ref 18–48)
MAGNESIUM SERPL-MCNC: 1.9 MG/DL (ref 1.6–2.6)
MCH RBC QN AUTO: 30.3 PG (ref 27–31)
MCHC RBC AUTO-ENTMCNC: 31.3 G/DL (ref 32–36)
MCV RBC AUTO: 97 FL (ref 82–98)
MONOCYTES # BLD AUTO: 1.4 K/UL (ref 0.3–1)
MONOCYTES NFR BLD: 13.4 % (ref 4–15)
NEUTROPHILS # BLD AUTO: 7.6 K/UL (ref 1.8–7.7)
NEUTROPHILS NFR BLD: 72.8 % (ref 38–73)
NRBC BLD-RTO: 0 /100 WBC
PHOSPHATE SERPL-MCNC: 1.3 MG/DL (ref 2.7–4.5)
PLATELET # BLD AUTO: 298 K/UL (ref 150–450)
PMV BLD AUTO: 11 FL (ref 9.2–12.9)
POCT GLUCOSE: 196 MG/DL (ref 70–110)
POCT GLUCOSE: 199 MG/DL (ref 70–110)
POTASSIUM SERPL-SCNC: 3.8 MMOL/L (ref 3.5–5.1)
PROT SERPL-MCNC: 6.5 G/DL (ref 6–8.4)
RBC # BLD AUTO: 2.64 M/UL (ref 4–5.4)
SODIUM SERPL-SCNC: 131 MMOL/L (ref 136–145)
WBC # BLD AUTO: 10.45 K/UL (ref 3.9–12.7)

## 2024-02-24 PROCEDURE — 63600175 PHARM REV CODE 636 W HCPCS

## 2024-02-24 PROCEDURE — 99900035 HC TECH TIME PER 15 MIN (STAT)

## 2024-02-24 PROCEDURE — 27000221 HC OXYGEN, UP TO 24 HOURS

## 2024-02-24 PROCEDURE — 84100 ASSAY OF PHOSPHORUS: CPT | Performed by: INTERNAL MEDICINE

## 2024-02-24 PROCEDURE — 25000003 PHARM REV CODE 250: Performed by: INTERNAL MEDICINE

## 2024-02-24 PROCEDURE — 36415 COLL VENOUS BLD VENIPUNCTURE: CPT | Performed by: INTERNAL MEDICINE

## 2024-02-24 PROCEDURE — 25000003 PHARM REV CODE 250: Performed by: HOSPITALIST

## 2024-02-24 PROCEDURE — 80053 COMPREHEN METABOLIC PANEL: CPT | Performed by: INTERNAL MEDICINE

## 2024-02-24 PROCEDURE — 20600001 HC STEP DOWN PRIVATE ROOM

## 2024-02-24 PROCEDURE — 83735 ASSAY OF MAGNESIUM: CPT | Performed by: INTERNAL MEDICINE

## 2024-02-24 PROCEDURE — 85025 COMPLETE CBC W/AUTO DIFF WBC: CPT | Performed by: INTERNAL MEDICINE

## 2024-02-24 PROCEDURE — 25000003 PHARM REV CODE 250

## 2024-02-24 PROCEDURE — 94664 DEMO&/EVAL PT USE INHALER: CPT

## 2024-02-24 PROCEDURE — 94761 N-INVAS EAR/PLS OXIMETRY MLT: CPT

## 2024-02-24 RX ORDER — METOPROLOL TARTRATE 25 MG/1
12.5 TABLET ORAL 2 TIMES DAILY
Status: DISCONTINUED | OUTPATIENT
Start: 2024-02-24 | End: 2024-03-01 | Stop reason: HOSPADM

## 2024-02-24 RX ORDER — SODIUM CHLORIDE 9 MG/ML
INJECTION, SOLUTION INTRAVENOUS ONCE
Status: COMPLETED | OUTPATIENT
Start: 2024-02-26 | End: 2024-02-26

## 2024-02-24 RX ADMIN — PANTOPRAZOLE SODIUM 40 MG: 40 TABLET, DELAYED RELEASE ORAL at 05:02

## 2024-02-24 RX ADMIN — HEPARIN SODIUM 5000 UNITS: 5000 INJECTION INTRAVENOUS; SUBCUTANEOUS at 04:02

## 2024-02-24 RX ADMIN — HEPARIN SODIUM 5000 UNITS: 5000 INJECTION INTRAVENOUS; SUBCUTANEOUS at 05:02

## 2024-02-24 RX ADMIN — PANTOPRAZOLE SODIUM 40 MG: 40 TABLET, DELAYED RELEASE ORAL at 04:02

## 2024-02-24 RX ADMIN — AMIODARONE HYDROCHLORIDE 400 MG: 200 TABLET ORAL at 08:02

## 2024-02-24 RX ADMIN — PREGABALIN 75 MG: 75 CAPSULE ORAL at 08:02

## 2024-02-24 RX ADMIN — VALSARTAN 20 MG: 40 TABLET, FILM COATED ORAL at 08:02

## 2024-02-24 RX ADMIN — ACETAMINOPHEN 650 MG: 325 TABLET ORAL at 03:02

## 2024-02-24 RX ADMIN — ATORVASTATIN CALCIUM 40 MG: 40 TABLET, FILM COATED ORAL at 08:02

## 2024-02-24 RX ADMIN — Medication 6 MG: at 08:02

## 2024-02-24 RX ADMIN — HEPARIN SODIUM 5000 UNITS: 5000 INJECTION INTRAVENOUS; SUBCUTANEOUS at 09:02

## 2024-02-24 RX ADMIN — VENLAFAXINE 37.5 MG: 37.5 TABLET ORAL at 09:02

## 2024-02-24 RX ADMIN — ACETAMINOPHEN 650 MG: 325 TABLET ORAL at 07:02

## 2024-02-24 RX ADMIN — ACETAMINOPHEN 650 MG: 325 TABLET ORAL at 08:02

## 2024-02-24 RX ADMIN — Medication 324 MG: at 08:02

## 2024-02-24 RX ADMIN — METOPROLOL TARTRATE 12.5 MG: 25 TABLET, FILM COATED ORAL at 09:02

## 2024-02-24 RX ADMIN — INSULIN ASPART 1 UNITS: 100 INJECTION, SOLUTION INTRAVENOUS; SUBCUTANEOUS at 08:02

## 2024-02-24 RX ADMIN — METOPROLOL TARTRATE 12.5 MG: 25 TABLET, FILM COATED ORAL at 08:02

## 2024-02-24 RX ADMIN — DRONABINOL 5 MG: 2.5 CAPSULE ORAL at 04:02

## 2024-02-24 RX ADMIN — VALSARTAN 20 MG: 40 TABLET, FILM COATED ORAL at 09:02

## 2024-02-24 RX ADMIN — DRONABINOL 5 MG: 2.5 CAPSULE ORAL at 05:02

## 2024-02-24 RX ADMIN — ASPIRIN 81 MG: 81 TABLET, COATED ORAL at 08:02

## 2024-02-24 NOTE — CARE UPDATE
"RAPID RESPONSE NURSE CHART REVIEW        Chart Reviewed: 02/24/2024, 12:13 AM    MRN: [id number]  Bed: 8075/8075 A    Dx: Acute hypoxemic respiratory failure    Lily Green has a past medical history of Anxiety, Celiac disease, Celiac disease, Depression, Diabetes mellitus, Family history of breast cancer in mother, Hyperlipidemia, Hypertension, Meniere disease, Meniere's disease, unspecified ear, Menopause, Peptic ulcer, Peritonitis, Reflux esophagitis, Urinary tract infection, Vaginal infection, and Vaginal Pap smear.    Last VS: BP (!) 148/84   Pulse 82   Temp 98.6 °F (37 °C) (Axillary)   Resp 18   Ht 5' 5" (1.651 m)   Wt 67.6 kg (149 lb 0.5 oz)   LMP  (LMP Unknown) Comment: BEAU/ NO BSO  1986  SpO2 100%   Breastfeeding No   BMI 24.80 kg/m²     24H Vital Sign Range:  Temp:  [96.7 °F (35.9 °C)-99.1 °F (37.3 °C)]   Pulse:  []   Resp:  [18-20]   BP: (107-168)/(56-84)   SpO2:  [96 %-100 %]     Level of Consciousness (AVPU): alert    Recent Labs     02/21/24  0746 02/22/24  0501 02/23/24  0350   WBC 9.00 12.92* 9.41   HGB 7.9* 8.8* 7.7*   HCT 25.7* 28.0* 23.7*    260 238       Recent Labs     02/21/24  0746 02/22/24  0501 02/23/24  0350   * 135* 132*   K 4.3 3.8 3.6    99 97   CO2 23 26 24   BUN 35* 19 33*   CREATININE 2.5* 1.7* 2.6*   * 146* 112*   PHOS 2.9 2.5* 2.0*   MG 2.1 1.9 2.0        OXYGEN:  Flow (L/min): 4  Oxygen Concentration (%): 24       MEWS score: 1    Rounding completed with charge ARABELLA Bowden.   No concerns verbalized at this time. Instructed to call [phone] for further concerns or assistance.    Pavel Sabillon RN       "

## 2024-02-24 NOTE — NURSING
Patient remains free from falls and injury. No distress noted. Patient found to be in chair, assisted there by private sitter. Patient breathing 30 breaths/minute. Instructed patient to practice slow, deep, breathing. Patient able to demonstrate correctly. Tray set up provided, and independence encouraged. Patient verbalized understanding.

## 2024-02-24 NOTE — PROGRESS NOTES
[facility] - Tele[facility] Stepdown  Wound Care    Patient Name:  Lily Green   MRN:  [id number]  Date: 2024  Diagnosis: Acute hypoxemic respiratory failure    History:     Past Medical History:   Diagnosis Date    Anxiety     Celiac disease 2018    Celiac disease     Depression     Diabetes mellitus     Family history of breast cancer in mother      at age 68    Hyperlipidemia     Hypertension     Meniere disease     Meniere's disease, unspecified ear     Menopause     Peptic ulcer     Peritonitis 2023    Reflux esophagitis     Urinary tract infection     Vaginal infection     Vaginal Pap smear 2014    Pap/Hpv Negative        Social History     Socioeconomic History    Marital status:    Tobacco Use    Smoking status: Never    Smokeless tobacco: Never   Substance and Sexual Activity    Alcohol use: No    Drug use: Never    Sexual activity: Not Currently     Partners: Male     Birth control/protection: See Surgical Hx     Comment: :      Social Determinants of Health     Financial Resource Strain: Low Risk  (2024)    Overall Financial Resource Strain (CARDIA)     Difficulty of Paying Living Expenses: Not hard at all   Food Insecurity: No Food Insecurity (2024)    Hunger Vital Sign     Worried About Running Out of Food in the Last Year: Never true     Ran Out of Food in the Last Year: Never true   Transportation Needs: No Transportation Needs (2024)    PRAPARE - Transportation     Lack of Transportation (Medical): No     Lack of Transportation (Non-Medical): No   Physical Activity: Inactive (2024)    Exercise Vital Sign     Days of Exercise per Week: 0 days     Minutes of Exercise per Session: 0 min   Stress: Stress Concern Present (2024)    [employer] [place] [employer] - Occupational Stress Questionnaire     Feeling of Stress : To some extent   Social Connections: Moderately Integrated (2024)    Social Connection and Isolation  Panel [NHANES]     Frequency of Communication with Friends and Family: More than three times a week     Frequency of Social Gatherings with Friends and Family: More than three times a week     Attends [employer] Services: More than 4 times per year     Active Member of Clubs or Organizations: No     Attends Club or Organization Meetings: Never     Marital Status:    Housing Stability: Low Risk  (2/14/2024)    Housing Stability Vital Sign     Unable to Pay for Housing in the Last Year: No     Number of Places Lived in the Last Year: 1     Unstable Housing in the Last Year: No       Precautions:     Allergies as of 02/12/2024 - Reviewed 02/12/2024   Allergen Reaction Noted    Crestor [rosuvastatin] Swelling 11/01/2017    Gluten protein  11/27/2020       WOC Assessment Details/Treatment     Pt off the unit at dialysis. Will return at a later time for skin/ wound assessment follow up.    Whitley Garcia BSN, RN, CWOCN  02/23/2024

## 2024-02-24 NOTE — NURSING
3 hours HD tx completed. 2 L of fluid removed.    Patient tolerated well.    Blood returned. Lines flushed with NS. Catheter locked with Heparin. Clamped, capped, and wrapped with sterile gauze.    Report given to AYLEEN Gant.

## 2024-02-24 NOTE — PROGRESS NOTES
[facility] - Telemetry [facility] Medicine  Progress Note    Patient Name: Lily Green  MRN: [id number]  Patient Class: IP- Inpatient   Admission Date: 2/12/2024  Length of Stay: 12 days  Attending Physician: Patrick Guardado MD  Primary Care Provider: Pavel Calixto MD        Subjective:     Principal Problem:Acute hypoxemic respiratory failure        HPI:  Lily Green is a 73 year old white woman with celiac disease, diabetes mellitus type 2 with polyneuropathy, hypertension, hyperlipidemia, history of peptic ulcer disease, depression, anxiety, history of hysterectomy in 1987, history of peritonitis due to diffuse colonic ischemia status post total colectomy and end ileostomy creation on 1/14/2023, evacuation of intraabdominal hematoma, takedown of left lateral ileostomy and recreation of right lateral end ileostomy on 1/16/2023, elective robotic ileostomy reversal and ileocolonic anastomosis on 8/7/2023, irritable bowel syndrome, end stage renal disease status post right internal jugular permanent dialysis catheter on 1/25/2023 removed on 1/28/2023 and replaced with left internal jugular permanent dialysis catheter (on hemodialysis Tuesday Thursday Saturday), right middle lobe carcinoid tumor status post robotic-assisted converted to right thoracotomy with therapeutic wedge resection and mediastinal lymph node dissection and repair of bleeding right inferior pulmonary vein on 1/2/2024, atrial fibrillation, [employer]tronic Micra AV leadless cardiac pacemaker placement on 1/16/2024. She lives in [place], [place]. She is . Her primary care physician is Dr. Pavel Calixto.               She was hospitalized at [facility] from 1/2/2024 to 2/6/2024 for right middle lobe carcinoid tumor, parapneumonic effusion, atrial fibrillation with rapid ventricular response, tachy-eulogio syndrome, gastrointestinal bleeding while on heparin. She was discharged to [facility] nursing [facility]. She was prescribed cefpodoxime and metronidazole for 13 days, fluconazole for 7 days, metoprolol tartrate, and venlafaxine.               She presented to [facility] Emergency Department on 2/12/2024 from the [facility] nursing [facility] with increased work of breathing, dyspnea, and hypoxia with oxygen saturation of 70% on 2 liters/minute of supplemental oxygen. It occurred acutely on the day of presentation. Emergency medical services put her on CPAP with some improvement. She was put on BiPAP in the emergency department. She had tachycardia and tachypnea. Her last dialysis session was 2 days prior on Saturday. She reported cough and nausea. Labs showed BNP 2754 pg/mL, troponin 0.312 ng/mL. Venous blood gas showed pH 7.438, pCO2 38.9. EKG showed sinus tachycardia. Chest X-ray showed pleural effusions and possible multifocal pneumonia. Nephrology was consulted for emergent dialysis with goal of removing 3 liters. She was admitted to Critical Care Medicine.    Overview/Hospital Course:  Echocardiogram revealed new low left ventricular ejection fraction. Troponins remained elevated. Cardiology was consulted. She developed atrial fibrillation with rapid ventricular response on 2/13/2024. She was started on amiodarone infusion. She underwent slow low-efficiency dialysis (SLED). CT showed fluid collections in the right paraesophageal space and azygoesophageal recess. Cardiothoracic Surgery was consulted and recommended Interventional Radiology consult for drainage, but there was no safe window for approach. Interventional Cardiology deferred angiography due to respiratory failure. Palliative Care was consulted. Dialysis removed further fluid. Metoprolol was increased. Thoracentesis was performed on 2/15/2024 removing 1.45 liters, which was sent for analysis. She developed re-expansion pulmonary edema and was started on Airvo high flow oxygen. Airvo was weaned  down to 50 liters/minute and FiO2 40% on 2/16/2024. Pleural effusion was transudative. She was started on vasopressor support. She was weaned to high flow nasal cannula at 12 liters/minute on 2/17/2024. She tolerated SLED without norepinephrine. She was weaned to 4 liters/minute on 2/18/2024. She was transferred to [facility] Medicine Team W on 2/20/2024. She was started on nifedipine. She worked with Physical and Occupational Therapy. Interventional Cardiology was reconsulted. Left heart catheterization showed luminal irregularities. Interventional Cardiology recommended starting aspirin 81 mg daily and atorvastatin 10 mg daily and consulting Heart Transplant Service (HTS) for management of nonischemic cardiomyopathy. She was already on atorvastatin 40 mg daily. Palliative Care signed off and planned to see her outpatient. She continued to require 4 liters/minute of supplemental oxygen. She was still short of breath. HTS was consulted and they said General Cardiology could be consulted. Cardiology recommended stopping nifedipine, starting valsartan, and resuming her metoprolol.    Interval History: Still on 4 liters/minute of O2. Seen by Cardiology.    Review of Systems   Constitutional:  Negative for chills and fever.   Respiratory:  Positive for shortness of breath. Negative for cough.    Gastrointestinal:  Negative for blood in stool.   Neurological:  Negative for syncope and headaches.     Objective:     Vital Signs (Most Recent):  Temp: 97.4 °F (36.3 °C) (02/24/24 0525)  Pulse: 76 (02/24/24 0800)  Resp: 18 (02/24/24 0800)  BP: 116/67 (02/24/24 0525)  SpO2: 98 % (02/24/24 0800) Vital Signs (24h Range):  Temp:  [96.7 °F (35.9 °C)-99.1 °F (37.3 °C)] 97.4 °F (36.3 °C)  Pulse:  [] 76  Resp:  [18] 18  SpO2:  [96 %-100 %] 98 %  BP: (107-168)/(56-84) 116/67     Weight: 67.6 kg (149 lb 0.5 oz)  Body mass index is 24.8 kg/m².    Intake/Output Summary (Last 24 hours) at 2/24/2024 0909  Last data filed at 2/23/2024  1755  Gross per 24 hour   Intake --   Output 2650 ml   Net -2650 ml           Physical Exam  Vitals and nursing note reviewed.   Constitutional:       General: She is not in acute distress.     Appearance: She is not diaphoretic.      Interventions: She is not intubated.Nasal cannula in place.   Pulmonary:      Effort: Pulmonary effort is normal. No accessory muscle usage or respiratory distress. She is not intubated.   Musculoskeletal:         General: No swelling or tenderness.      Right lower leg: No edema.      Left lower leg: No edema.   Skin:     General: Skin is warm and dry.      Coloration: Skin is not jaundiced.   Neurological:      Mental Status: She is alert and oriented to person, place, and time. Mental status is at baseline.      Motor: No seizure activity.   Psychiatric:         Attention and Perception: Attention normal.         Behavior: Behavior normal. Behavior is not aggressive or combative.             Significant Labs: All pertinent labs within the past 24 hours have been reviewed.    Significant Imaging: I have reviewed all pertinent imaging results/findings within the past 24 hours.    Assessment/Plan:      * Acute hypoxemic respiratory failure  Wean oxygen as tolerated. Still requiring 4 liters/minute. Dialysis for fluid removal.    Moderate malnutrition  Nutrition consulted. Most recent weight and BMI monitored-     Measurements:  Wt Readings from Last 1 Encounters:   02/20/24 67.6 kg (149 lb 0.5 oz)   Body mass index is 24.8 kg/m².    Patient has been screened and assessed by RD.    Malnutrition Type:  Context: chronic illness  Level: moderate    Malnutrition Characteristic Summary:  Energy Intake (Malnutrition): less than 75% for greater than or equal to 1 month  Muscle Mass (Malnutrition): mild depletion  Fluid Accumulation (Malnutrition): severe    Interventions/Recommendations (treatment strategy):  1.Continue gluten free diet with texture per SLP 2. modify ONS to [facility] renal  TID 3. RD to monitor and follow       Atrial flutter  Started amiodarone. Anticoagulation contraindicated.      Acute decompensated heart failure  LHC done. Nonischemic cardiomyopathy. Appreciate Cardiology.  Stop nifedipine. Resume home metoprolol. Start valsartan.    Secondary hyperparathyroidism  Stable.      Pericardial effusion  Treating fluid overload      Chronic kidney disease-mineral and bone disorder  Continue home sevelamer.    Gastric ulcer  Prescribed PPI to take until 2/23/2024.    Pleural effusion on left  Treating fluid overload.        Atrial fibrillation  Chronic. Home metoprolol changed to amiodarone.    ESRD (end stage renal disease)  Nephrology following.    Anemia in ESRD (end-stage renal disease)  Patient's anemia is currently controlled. Has not received any PRBCs to date. Etiology likely d/t chronic disease due to Chronic Kidney Disease/ESRD  Current CBC reviewed-   Lab Results   Component Value Date    HGB 7.9 (L) 02/21/2024    HCT 25.7 (L) 02/21/2024     Monitor serial CBC and transfuse if patient becomes hemodynamically unstable, symptomatic or H/H drops below 7/21.    Generalized anxiety disorder  Continue home venlafaxine       Hyperlipidemia, mixed  Continue home atorvastatin.      Type 2 diabetes mellitus with diabetic polyneuropathy, without long-term current use of insulin  Patient's FSGs are controlled on current medication regimen.  Last A1c reviewed-   Lab Results   Component Value Date    HGBA1C 4.9 11/15/2023     Most recent fingerstick glucose reviewed-   Recent Labs   Lab 02/20/24  1713 02/20/24  2245 02/21/24  0937   POCTGLUCOSE 214* 220* 189*       Current correctional scale  Low  Maintain anti-hyperglycemic dose as follows-   Antihyperglycemics (From admission, onward)      Start     Stop Route Frequency Ordered    02/15/24 1054  insulin aspart U-100 pen 0-10 Units         -- SubQ Before meals & nightly PRN 02/15/24 1055          Hold Oral hypoglycemics while patient is in  the hospital.      VTE Risk Mitigation (From admission, onward)           Ordered     heparin (porcine) injection 1,000 Units  As needed (PRN)         02/23/24 1521     heparin (porcine) injection 5,000 Units  Every 8 hours         02/12/24 0416     IP VTE HIGH RISK PATIENT  Once         02/12/24 0416     Place sequential compression device  Until discontinued         02/12/24 0416     heparin (porcine) injection 1,000 Units  As needed (PRN)         02/12/24 0229                    Discharge Planning   CAMPBELL: 2/26/2024     Code Status: Full Code   Is the patient medically ready for discharge?: No    Reason for patient still in hospital (select all that apply): Patient unstable, Patient trending condition, and Treatment  Discharge Plan A: Skilled Nursing Facility   Discharge Delays: None known at this time              Patrick Guardado MD  Department of Hospital Medicine   [facility] - Telemetry Stepdown

## 2024-02-24 NOTE — PLAN OF CARE
Problem: Device-Related Complication Risk (Hemodialysis)  Goal: Safe, Effective Therapy Delivery  Outcome: Ongoing, Progressing     Problem: Hemodynamic Instability (Hemodialysis)  Goal: Effective Tissue Perfusion  Outcome: Ongoing, Progressing     Problem: Infection (Hemodialysis)  Goal: Absence of Infection Signs and Symptoms  Outcome: Ongoing, Progressing     Problem: Adult Inpatient Plan of Care  Goal: Plan of Care Review  Outcome: Ongoing, Progressing  Goal: Patient-Specific Goal (Individualized)  Outcome: Ongoing, Progressing  Goal: Absence of Hospital-Acquired Illness or Injury  Outcome: Ongoing, Progressing  Goal: Optimal Comfort and Wellbeing  Outcome: Ongoing, Progressing  Goal: Readiness for Transition of Care  Outcome: Ongoing, Progressing     Problem: Device-Related Complication Risk (CRRT (Continuous Renal Replacement Therapy))  Goal: Safe, Effective Therapy Delivery  Outcome: Ongoing, Progressing     Problem: Hypothermia (CRRT (Continuous Renal Replacement Therapy))  Goal: Body Temperature Maintained in Desired Range  Outcome: Ongoing, Progressing     Problem: Infection (CRRT (Continuous Renal Replacement Therapy))  Goal: Absence of Infection Signs and Symptoms  Outcome: Ongoing, Progressing     Problem: Diabetes Comorbidity  Goal: Blood Glucose Level Within Targeted Range  Outcome: Ongoing, Progressing     Problem: Fluid Imbalance (Pneumonia)  Goal: Fluid Balance  Outcome: Ongoing, Progressing     Problem: Infection (Pneumonia)  Goal: Resolution of Infection Signs and Symptoms  Outcome: Ongoing, Progressing     Problem: Respiratory Compromise (Pneumonia)  Goal: Effective Oxygenation and Ventilation  Outcome: Ongoing, Progressing     Problem: Infection  Goal: Absence of Infection Signs and Symptoms  Outcome: Ongoing, Progressing     Problem: Impaired Wound Healing  Goal: Optimal Wound Healing  Outcome: Ongoing, Progressing     Problem: Skin Injury Risk Increased  Goal: Skin Health and  Integrity  Outcome: Ongoing, Progressing     Problem: Coping Ineffective  Goal: Effective Coping  Outcome: Ongoing, Progressing    Patient remains free from falls and injury. NADN. VSS. Safety maintained; bed low and locked, call light in reach.  No complaint of pain, n/v, diarrhea, or SOB. Questions encouraged and answered. Plan of care reviewed with patient. Will continue to monitor, will continue with plan of care. Private sitter at the bedside assisting patient.

## 2024-02-24 NOTE — ASSESSMENT & PLAN NOTE
LHC done. Nonischemic cardiomyopathy. Appreciate Cardiology.  Stop nifedipine. Resume home metoprolol. Start valsartan.

## 2024-02-24 NOTE — SUBJECTIVE & OBJECTIVE
Interval History: Still on 4 liters/minute of O2. Seen by Cardiology.    Review of Systems   Constitutional:  Negative for chills and fever.   Respiratory:  Positive for shortness of breath. Negative for cough.    Gastrointestinal:  Negative for blood in stool.   Neurological:  Negative for syncope and headaches.     Objective:     Vital Signs (Most Recent):  Temp: 97.4 °F (36.3 °C) (02/24/24 0525)  Pulse: 76 (02/24/24 0800)  Resp: 18 (02/24/24 0800)  BP: 116/67 (02/24/24 0525)  SpO2: 98 % (02/24/24 0800) Vital Signs (24h Range):  Temp:  [96.7 °F (35.9 °C)-99.1 °F (37.3 °C)] 97.4 °F (36.3 °C)  Pulse:  [] 76  Resp:  [18] 18  SpO2:  [96 %-100 %] 98 %  BP: (107-168)/(56-84) 116/67     Weight: 67.6 kg (149 lb 0.5 oz)  Body mass index is 24.8 kg/m².    Intake/Output Summary (Last 24 hours) at 2/24/2024 0909  Last data filed at 2/23/2024 1755  Gross per 24 hour   Intake --   Output 2650 ml   Net -2650 ml           Physical Exam  Vitals and nursing note reviewed.   Constitutional:       General: She is not in acute distress.     Appearance: She is not diaphoretic.      Interventions: She is not intubated.Nasal cannula in place.   Pulmonary:      Effort: Pulmonary effort is normal. No accessory muscle usage or respiratory distress. She is not intubated.   Musculoskeletal:         General: No swelling or tenderness.      Right lower leg: No edema.      Left lower leg: No edema.   Skin:     General: Skin is warm and dry.      Coloration: Skin is not jaundiced.   Neurological:      Mental Status: She is alert and oriented to person, place, and time. Mental status is at baseline.      Motor: No seizure activity.   Psychiatric:         Attention and Perception: Attention normal.         Behavior: Behavior normal. Behavior is not aggressive or combative.             Significant Labs: All pertinent labs within the past 24 hours have been reviewed.    Significant Imaging: I have reviewed all pertinent imaging results/findings  within the past 24 hours.

## 2024-02-25 LAB
ALBUMIN SERPL BCP-MCNC: 2 G/DL (ref 3.5–5.2)
ALP SERPL-CCNC: 88 U/L (ref 55–135)
ALT SERPL W/O P-5'-P-CCNC: 6 U/L (ref 10–44)
ANION GAP SERPL CALC-SCNC: 11 MMOL/L (ref 8–16)
AST SERPL-CCNC: 15 U/L (ref 10–40)
BASOPHILS # BLD AUTO: 0.03 K/UL (ref 0–0.2)
BASOPHILS NFR BLD: 0.3 % (ref 0–1.9)
BILIRUB SERPL-MCNC: 0.3 MG/DL (ref 0.1–1)
BUN SERPL-MCNC: 43 MG/DL (ref 8–23)
CALCIUM SERPL-MCNC: 9.4 MG/DL (ref 8.7–10.5)
CHLORIDE SERPL-SCNC: 101 MMOL/L (ref 95–110)
CO2 SERPL-SCNC: 22 MMOL/L (ref 23–29)
CREAT SERPL-MCNC: 2.7 MG/DL (ref 0.5–1.4)
DIFFERENTIAL METHOD BLD: ABNORMAL
EOSINOPHIL # BLD AUTO: 0.1 K/UL (ref 0–0.5)
EOSINOPHIL NFR BLD: 0.8 % (ref 0–8)
ERYTHROCYTE [DISTWIDTH] IN BLOOD BY AUTOMATED COUNT: 22.1 % (ref 11.5–14.5)
EST. GFR  (NO RACE VARIABLE): 18.1 ML/MIN/1.73 M^2
GLUCOSE SERPL-MCNC: 161 MG/DL (ref 70–110)
HCT VFR BLD AUTO: 26.9 % (ref 37–48.5)
HGB BLD-MCNC: 8.2 G/DL (ref 12–16)
IMM GRANULOCYTES # BLD AUTO: 0.28 K/UL (ref 0–0.04)
IMM GRANULOCYTES NFR BLD AUTO: 2.9 % (ref 0–0.5)
LYMPHOCYTES # BLD AUTO: 0.8 K/UL (ref 1–4.8)
LYMPHOCYTES NFR BLD: 8.3 % (ref 18–48)
MAGNESIUM SERPL-MCNC: 2.1 MG/DL (ref 1.6–2.6)
MCH RBC QN AUTO: 30.4 PG (ref 27–31)
MCHC RBC AUTO-ENTMCNC: 30.5 G/DL (ref 32–36)
MCV RBC AUTO: 100 FL (ref 82–98)
MONOCYTES # BLD AUTO: 1.1 K/UL (ref 0.3–1)
MONOCYTES NFR BLD: 10.8 % (ref 4–15)
NEUTROPHILS # BLD AUTO: 7.5 K/UL (ref 1.8–7.7)
NEUTROPHILS NFR BLD: 76.9 % (ref 38–73)
NRBC BLD-RTO: 0 /100 WBC
PHOSPHATE SERPL-MCNC: 1.9 MG/DL (ref 2.7–4.5)
PLATELET # BLD AUTO: 305 K/UL (ref 150–450)
PMV BLD AUTO: 10.9 FL (ref 9.2–12.9)
POCT GLUCOSE: 131 MG/DL (ref 70–110)
POCT GLUCOSE: 164 MG/DL (ref 70–110)
POCT GLUCOSE: 184 MG/DL (ref 70–110)
POCT GLUCOSE: 193 MG/DL (ref 70–110)
POCT GLUCOSE: 197 MG/DL (ref 70–110)
POTASSIUM SERPL-SCNC: 4.1 MMOL/L (ref 3.5–5.1)
PROT SERPL-MCNC: 6.4 G/DL (ref 6–8.4)
RBC # BLD AUTO: 2.7 M/UL (ref 4–5.4)
SODIUM SERPL-SCNC: 134 MMOL/L (ref 136–145)
WBC # BLD AUTO: 9.8 K/UL (ref 3.9–12.7)

## 2024-02-25 PROCEDURE — 25000003 PHARM REV CODE 250

## 2024-02-25 PROCEDURE — 20600001 HC STEP DOWN PRIVATE ROOM

## 2024-02-25 PROCEDURE — 63600175 PHARM REV CODE 636 W HCPCS

## 2024-02-25 PROCEDURE — 84100 ASSAY OF PHOSPHORUS: CPT | Performed by: INTERNAL MEDICINE

## 2024-02-25 PROCEDURE — 83735 ASSAY OF MAGNESIUM: CPT | Performed by: INTERNAL MEDICINE

## 2024-02-25 PROCEDURE — 25000003 PHARM REV CODE 250: Performed by: HOSPITALIST

## 2024-02-25 PROCEDURE — 85025 COMPLETE CBC W/AUTO DIFF WBC: CPT | Performed by: INTERNAL MEDICINE

## 2024-02-25 PROCEDURE — 25000003 PHARM REV CODE 250: Performed by: INTERNAL MEDICINE

## 2024-02-25 PROCEDURE — 36415 COLL VENOUS BLD VENIPUNCTURE: CPT | Performed by: INTERNAL MEDICINE

## 2024-02-25 PROCEDURE — 80053 COMPREHEN METABOLIC PANEL: CPT | Performed by: INTERNAL MEDICINE

## 2024-02-25 RX ADMIN — PANTOPRAZOLE SODIUM 40 MG: 40 TABLET, DELAYED RELEASE ORAL at 06:02

## 2024-02-25 RX ADMIN — DRONABINOL 5 MG: 2.5 CAPSULE ORAL at 04:02

## 2024-02-25 RX ADMIN — ASPIRIN 81 MG: 81 TABLET, COATED ORAL at 08:02

## 2024-02-25 RX ADMIN — ATORVASTATIN CALCIUM 40 MG: 40 TABLET, FILM COATED ORAL at 08:02

## 2024-02-25 RX ADMIN — VALSARTAN 20 MG: 40 TABLET, FILM COATED ORAL at 08:02

## 2024-02-25 RX ADMIN — AMIODARONE HYDROCHLORIDE 200 MG: 200 TABLET ORAL at 09:02

## 2024-02-25 RX ADMIN — INSULIN ASPART 2 UNITS: 100 INJECTION, SOLUTION INTRAVENOUS; SUBCUTANEOUS at 08:02

## 2024-02-25 RX ADMIN — METOPROLOL TARTRATE 12.5 MG: 25 TABLET, FILM COATED ORAL at 08:02

## 2024-02-25 RX ADMIN — Medication 6 MG: at 08:02

## 2024-02-25 RX ADMIN — INSULIN ASPART 2 UNITS: 100 INJECTION, SOLUTION INTRAVENOUS; SUBCUTANEOUS at 05:02

## 2024-02-25 RX ADMIN — HEPARIN SODIUM 5000 UNITS: 5000 INJECTION INTRAVENOUS; SUBCUTANEOUS at 02:02

## 2024-02-25 RX ADMIN — INSULIN ASPART 2 UNITS: 100 INJECTION, SOLUTION INTRAVENOUS; SUBCUTANEOUS at 12:02

## 2024-02-25 RX ADMIN — HEPARIN SODIUM 5000 UNITS: 5000 INJECTION INTRAVENOUS; SUBCUTANEOUS at 10:02

## 2024-02-25 RX ADMIN — VENLAFAXINE 37.5 MG: 37.5 TABLET ORAL at 08:02

## 2024-02-25 RX ADMIN — ACETAMINOPHEN 650 MG: 325 TABLET ORAL at 08:02

## 2024-02-25 RX ADMIN — DRONABINOL 5 MG: 2.5 CAPSULE ORAL at 06:02

## 2024-02-25 RX ADMIN — HEPARIN SODIUM 5000 UNITS: 5000 INJECTION INTRAVENOUS; SUBCUTANEOUS at 05:02

## 2024-02-25 RX ADMIN — Medication 324 MG: at 08:02

## 2024-02-25 RX ADMIN — PANTOPRAZOLE SODIUM 40 MG: 40 TABLET, DELAYED RELEASE ORAL at 04:02

## 2024-02-25 NOTE — PLAN OF CARE
Problem: Device-Related Complication Risk (Hemodialysis)  Goal: Safe, Effective Therapy Delivery  Outcome: Ongoing, Progressing     Problem: Hemodynamic Instability (Hemodialysis)  Goal: Effective Tissue Perfusion  Outcome: Ongoing, Progressing     Problem: Infection (Hemodialysis)  Goal: Absence of Infection Signs and Symptoms  Outcome: Ongoing, Progressing     Problem: Adult Inpatient Plan of Care  Goal: Plan of Care Review  Outcome: Ongoing, Progressing  Goal: Patient-Specific Goal (Individualized)  Outcome: Ongoing, Progressing  Goal: Absence of Hospital-Acquired Illness or Injury  Outcome: Ongoing, Progressing  Goal: Optimal Comfort and Wellbeing  Outcome: Ongoing, Progressing  Goal: Readiness for Transition of Care  Outcome: Ongoing, Progressing     Problem: Device-Related Complication Risk (CRRT (Continuous Renal Replacement Therapy))  Goal: Safe, Effective Therapy Delivery  Outcome: Ongoing, Progressing     Problem: Hypothermia (CRRT (Continuous Renal Replacement Therapy))  Goal: Body Temperature Maintained in Desired Range  Outcome: Ongoing, Progressing     Problem: Hypothermia (CRRT (Continuous Renal Replacement Therapy))  Goal: Body Temperature Maintained in Desired Range  Outcome: Ongoing, Progressing     Problem: Infection (CRRT (Continuous Renal Replacement Therapy))  Goal: Absence of Infection Signs and Symptoms  Outcome: Ongoing, Progressing     Problem: Diabetes Comorbidity  Goal: Blood Glucose Level Within Targeted Range  Outcome: Ongoing, Progressing     Problem: Fluid Imbalance (Pneumonia)  Goal: Fluid Balance  Outcome: Ongoing, Progressing     Problem: Infection (Pneumonia)  Goal: Resolution of Infection Signs and Symptoms  Outcome: Ongoing, Progressing     Problem: Respiratory Compromise (Pneumonia)  Goal: Effective Oxygenation and Ventilation  Outcome: Ongoing, Progressing     Problem: Infection  Goal: Absence of Infection Signs and Symptoms  Outcome: Ongoing, Progressing     Problem:  Impaired Wound Healing  Goal: Optimal Wound Healing  Outcome: Ongoing, Progressing     Problem: Skin Injury Risk Increased  Goal: Skin Health and Integrity  Outcome: Ongoing, Progressing     Problem: Coping Ineffective  Goal: Effective Coping  Outcome: Ongoing, Progressing    Patient remains free from falls and injury. NADN. VSS. Safety maintained; bed low and locked, call light in reach.  No complaint of pain, n/v, diarrhea, or SOB. Questions encouraged and answered. Plan of care reviewed with patient. Will continue to monitor, will continue with plan of care.

## 2024-02-25 NOTE — NURSING
Pt had blood sugar checked at 1646  reading was 164 and was rechecked due to time and the new reading was 170 at 1730 given prn meds was given per sliding scale tolerated well.

## 2024-02-25 NOTE — PROGRESS NOTES
[facility] - Telemetry [facility] Medicine  Progress Note    Patient Name: Lily Green  MRN: [id number]  Patient Class: IP- Inpatient   Admission Date: 2/12/2024  Length of Stay: 13 days  Attending Physician: Patrick Guardado MD  Primary Care Provider: Pavel Calixto MD        Subjective:     Principal Problem:Acute hypoxemic respiratory failure        HPI:  Lily Green is a 73 year old white woman with celiac disease, diabetes mellitus type 2 with polyneuropathy, hypertension, hyperlipidemia, history of peptic ulcer disease, depression, anxiety, history of hysterectomy in 1987, history of peritonitis due to diffuse colonic ischemia status post total colectomy and end ileostomy creation on 1/14/2023, evacuation of intraabdominal hematoma, takedown of left lateral ileostomy and recreation of right lateral end ileostomy on 1/16/2023, elective robotic ileostomy reversal and ileocolonic anastomosis on 8/7/2023, irritable bowel syndrome, end stage renal disease status post right internal jugular permanent dialysis catheter on 1/25/2023 removed on 1/28/2023 and replaced with left internal jugular permanent dialysis catheter (on hemodialysis Tuesday Thursday Saturday), right middle lobe carcinoid tumor status post robotic-assisted converted to right thoracotomy with therapeutic wedge resection and mediastinal lymph node dissection and repair of bleeding right inferior pulmonary vein on 1/2/2024, atrial fibrillation, Medtronic Micra AV leadless cardiac pacemaker placement on 1/16/2024. She lives in [place], [place]. She is . Her primary care physician is Dr. Pavel Calixto.               She was hospitalized at [facility] from 1/2/2024 to 2/6/2024 for right middle lobe carcinoid tumor, parapneumonic effusion, atrial fibrillation with rapid ventricular response, tachy-eulogio syndrome, gastrointestinal bleeding while on heparin. She was discharged to [facility] nursing [facility]. She was prescribed cefpodoxime and metronidazole for 13 days, fluconazole for 7 days, metoprolol tartrate, and venlafaxine.               She presented to [facility] Emergency Department on 2/12/2024 from the [facility] nursing [facility] with increased work of breathing, dyspnea, and hypoxia with oxygen saturation of 70% on 2 liters/minute of supplemental oxygen. It occurred acutely on the day of presentation. Emergency medical services put her on CPAP with some improvement. She was put on BiPAP in the emergency department. She had tachycardia and tachypnea. Her last dialysis session was 2 days prior on Saturday. She reported cough and nausea. Labs showed BNP 2754 pg/mL, troponin 0.312 ng/mL. Venous blood gas showed pH 7.438, pCO2 38.9. EKG showed sinus tachycardia. Chest X-ray showed pleural effusions and possible multifocal pneumonia. Nephrology was consulted for emergent dialysis with goal of removing 3 liters. She was admitted to Critical Care Medicine.    Overview/Hospital Course:  Echocardiogram revealed new low left ventricular ejection fraction. Troponins remained elevated. Cardiology was consulted. She developed atrial fibrillation with rapid ventricular response on 2/13/2024. She was started on amiodarone infusion. She underwent slow low-efficiency dialysis (SLED). CT showed fluid collections in the right paraesophageal space and azygoesophageal recess. Cardiothoracic Surgery was consulted and recommended Interventional Radiology consult for drainage, but there was no safe window for approach. Interventional Cardiology deferred angiography due to respiratory failure. Palliative Care was consulted. Dialysis removed further fluid. Metoprolol was increased. Thoracentesis was performed on 2/15/2024 removing 1.45 liters, which was sent for analysis. She developed re-expansion pulmonary edema and was started on Airvo high flow oxygen. Airvo was weaned  down to 50 liters/minute and FiO2 40% on 2/16/2024. Pleural effusion was transudative. She was started on vasopressor support. She was weaned to high flow nasal cannula at 12 liters/minute on 2/17/2024. She tolerated SLED without norepinephrine. She was weaned to 4 liters/minute on 2/18/2024. She was transferred to [facility] Medicine Team W on 2/20/2024. She was started on nifedipine. She worked with Physical and Occupational Therapy. Interventional Cardiology was reconsulted. Left heart catheterization showed luminal irregularities. Interventional Cardiology recommended starting aspirin 81 mg daily and atorvastatin 10 mg daily and consulting Heart Transplant Service (HTS) for management of nonischemic cardiomyopathy. She was already on atorvastatin 40 mg daily. Palliative Care signed off and planned to see her outpatient. She continued to require 4 liters/minute of supplemental oxygen. She was still short of breath. HTS was consulted and they said General Cardiology could be consulted. Cardiology recommended stopping nifedipine, starting valsartan, and resuming her metoprolol. Nasal cannula was weaned to 2 liters/minute on 2/25/2024.     Interval History: Medication adjustments made per Cardiology recommendations. Oxygen weaned to 2 liters/minute.    Review of Systems   Constitutional:  Negative for chills and fever.   Gastrointestinal:  Negative for blood in stool.   Neurological:  Negative for syncope and headaches.     Objective:     Vital Signs (Most Recent):  Temp: 97.3 °F (36.3 °C) (02/25/24 0826)  Pulse: 72 (02/25/24 0826)  Resp: 18 (02/25/24 0826)  BP: 135/61 (02/25/24 0838)  SpO2: 100 % (02/25/24 0826) Vital Signs (24h Range):  Temp:  [97.3 °F (36.3 °C)-98.9 °F (37.2 °C)] 97.3 °F (36.3 °C)  Pulse:  [70-85] 72  Resp:  [18-24] 18  SpO2:  [97 %-100 %] 100 %  BP: (114-135)/(59-63) 135/61     Weight: 67.6 kg (149 lb 0.5 oz)  Body mass index is 24.8 kg/m².  No intake or output data in the 24 hours ending 02/25/24  0943        Physical Exam  Vitals and nursing note reviewed.   Constitutional:       General: She is not in acute distress.     Appearance: She is not diaphoretic.      Interventions: She is not intubated.Nasal cannula in place.   Pulmonary:      Effort: Pulmonary effort is normal. No accessory muscle usage or respiratory distress. She is not intubated.   Musculoskeletal:         General: No swelling or tenderness.      Right lower leg: No edema.      Left lower leg: No edema.   Skin:     General: Skin is warm and dry.      Coloration: Skin is not jaundiced.   Neurological:      Mental Status: She is alert and oriented to person, place, and time. Mental status is at baseline.      Motor: No seizure activity.   Psychiatric:         Attention and Perception: Attention normal.         Behavior: Behavior normal. Behavior is not aggressive or combative.             Significant Labs: All pertinent labs within the past 24 hours have been reviewed.    Significant Imaging: I have reviewed all pertinent imaging results/findings within the past 24 hours.    Assessment/Plan:      * Acute hypoxemic respiratory failure  Weaning supplemental oxygen. Dialysis for fluid removal.    Moderate malnutrition  Nutrition consulted. Most recent weight and BMI monitored-     Measurements:  Wt Readings from Last 1 Encounters:   02/20/24 67.6 kg (149 lb 0.5 oz)   Body mass index is 24.8 kg/m².    Patient has been screened and assessed by RD.    Malnutrition Type:  Context: chronic illness  Level: moderate    Malnutrition Characteristic Summary:  Energy Intake (Malnutrition): less than 75% for greater than or equal to 1 month  Muscle Mass (Malnutrition): mild depletion  Fluid Accumulation (Malnutrition): severe    Interventions/Recommendations (treatment strategy):  1.Continue gluten free diet with texture per SLP 2. modify ONS to [facility] renal TID 3. RD to monitor and follow       Atrial flutter  Started amiodarone. Anticoagulation  contraindicated.      Acute decompensated heart failure  LHC done. Nonischemic cardiomyopathy. Appreciate Cardiology.  Resumed home metoprolol. Started valsartan.    Secondary hyperparathyroidism  Stable.      Pericardial effusion  Treating fluid overload      Chronic kidney disease-mineral and bone disorder  Continue home sevelamer.    Gastric ulcer  Prescribed PPI to take until 2/23/2024.    Pleural effusion on left  Treating fluid overload.        Atrial fibrillation  Chronic. Home metoprolol changed to amiodarone.    ESRD (end stage renal disease)  Nephrology following.    Anemia in ESRD (end-stage renal disease)  Patient's anemia is currently controlled. Has not received any PRBCs to date. Etiology likely d/t chronic disease due to Chronic Kidney Disease/ESRD  Current CBC reviewed-   Lab Results   Component Value Date    HGB 7.9 (L) 02/21/2024    HCT 25.7 (L) 02/21/2024     Monitor serial CBC and transfuse if patient becomes hemodynamically unstable, symptomatic or H/H drops below 7/21.    Generalized anxiety disorder  Continue home venlafaxine       Hyperlipidemia, mixed  Continue home atorvastatin.      Type 2 diabetes mellitus with diabetic polyneuropathy, without long-term current use of insulin  Patient's FSGs are controlled on current medication regimen.  Last A1c reviewed-   Lab Results   Component Value Date    HGBA1C 4.9 11/15/2023     Most recent fingerstick glucose reviewed-   Recent Labs   Lab 02/20/24  1713 02/20/24  2245 02/21/24  0937   POCTGLUCOSE 214* 220* 189*       Current correctional scale  Low  Maintain anti-hyperglycemic dose as follows-   Antihyperglycemics (From admission, onward)      Start     Stop Route Frequency Ordered    02/15/24 1054  insulin aspart U-100 pen 0-10 Units         -- SubQ Before meals & nightly PRN 02/15/24 1055          Hold Oral hypoglycemics while patient is in the hospital.      VTE Risk Mitigation (From admission, onward)           Ordered     heparin (porcine)  injection 1,000 Units  As needed (PRN)         02/23/24 1521     heparin (porcine) injection 5,000 Units  Every 8 hours         02/12/24 0416     IP VTE HIGH RISK PATIENT  Once         02/12/24 0416     Place sequential compression device  Until discontinued         02/12/24 0416     heparin (porcine) injection 1,000 Units  As needed (PRN)         02/12/24 0229                    Discharge Planning   CAMPBELL: 2/26/2024     Code Status: Full Code   Is the patient medically ready for discharge?: No    Reason for patient still in hospital (select all that apply): Pending disposition  Discharge Plan A: Skilled Nursing Facility   Discharge Delays: None known at this time              Patrick Guardado MD  Department of Hospital Medicine   [facility] - Telemetry Stepdown

## 2024-02-25 NOTE — PLAN OF CARE
Problem: Device-Related Complication Risk (Hemodialysis)  Goal: Safe, Effective Therapy Delivery  Outcome: Ongoing, Progressing     Problem: Hemodynamic Instability (Hemodialysis)  Goal: Effective Tissue Perfusion  Outcome: Ongoing, Progressing     Problem: Infection (Hemodialysis)  Goal: Absence of Infection Signs and Symptoms  Outcome: Ongoing, Progressing     Problem: Adult Inpatient Plan of Care  Goal: Plan of Care Review  Outcome: Ongoing, Progressing  Goal: Patient-Specific Goal (Individualized)  Outcome: Ongoing, Progressing  Goal: Absence of Hospital-Acquired Illness or Injury  Outcome: Ongoing, Progressing  Goal: Optimal Comfort and Wellbeing  Outcome: Ongoing, Progressing  Goal: Readiness for Transition of Care  Outcome: Ongoing, Progressing     Problem: Device-Related Complication Risk (CRRT (Continuous Renal Replacement Therapy))  Goal: Safe, Effective Therapy Delivery  Outcome: Ongoing, Progressing     Problem: Hypothermia (CRRT (Continuous Renal Replacement Therapy))  Goal: Body Temperature Maintained in Desired Range  Outcome: Ongoing, Progressing     Problem: Infection (CRRT (Continuous Renal Replacement Therapy))  Goal: Absence of Infection Signs and Symptoms  Outcome: Ongoing, Progressing     Problem: Diabetes Comorbidity  Goal: Blood Glucose Level Within Targeted Range  Outcome: Ongoing, Progressing     Problem: Fluid Imbalance (Pneumonia)  Goal: Fluid Balance  Outcome: Ongoing, Progressing     Problem: Infection (Pneumonia)  Goal: Resolution of Infection Signs and Symptoms  Outcome: Ongoing, Progressing     Problem: Respiratory Compromise (Pneumonia)  Goal: Effective Oxygenation and Ventilation  Outcome: Ongoing, Progressing     Problem: Infection  Goal: Absence of Infection Signs and Symptoms  Outcome: Ongoing, Progressing     Problem: Impaired Wound Healing  Goal: Optimal Wound Healing  Outcome: Ongoing, Progressing     Problem: Skin Injury Risk Increased  Goal: Skin Health and  Integrity  Outcome: Ongoing, Progressing     Problem: Coping Ineffective  Goal: Effective Coping  Outcome: Ongoing, Progressing  Pt sitting up in chair in her room eating her pizza resp even and unlabored no distress noted at this time takes her meds whole with water tolerates well continues to have her blood sugar monitored safety precautions maintained.

## 2024-02-25 NOTE — PLAN OF CARE
Patient stable.VSS.AOX3. spontaneously breathing with 2L N/C. Safety measures ensured.call light within reach. Bed in low position.

## 2024-02-25 NOTE — ASSESSMENT & PLAN NOTE
LHC done. Nonischemic cardiomyopathy. Appreciate Cardiology.  Resumed home metoprolol. Started valsartan.

## 2024-02-25 NOTE — SUBJECTIVE & OBJECTIVE
Interval History: Medication adjustments made per Cardiology recommendations. Oxygen weaned to 2 liters/minute.    Review of Systems   Constitutional:  Negative for chills and fever.   Gastrointestinal:  Negative for blood in stool.   Neurological:  Negative for syncope and headaches.     Objective:     Vital Signs (Most Recent):  Temp: 97.3 °F (36.3 °C) (02/25/24 0826)  Pulse: 72 (02/25/24 0826)  Resp: 18 (02/25/24 0826)  BP: 135/61 (02/25/24 0838)  SpO2: 100 % (02/25/24 0826) Vital Signs (24h Range):  Temp:  [97.3 °F (36.3 °C)-98.9 °F (37.2 °C)] 97.3 °F (36.3 °C)  Pulse:  [70-85] 72  Resp:  [18-24] 18  SpO2:  [97 %-100 %] 100 %  BP: (114-135)/(59-63) 135/61     Weight: 67.6 kg (149 lb 0.5 oz)  Body mass index is 24.8 kg/m².  No intake or output data in the 24 hours ending 02/25/24 0943        Physical Exam  Vitals and nursing note reviewed.   Constitutional:       General: She is not in acute distress.     Appearance: She is not diaphoretic.      Interventions: She is not intubated.Nasal cannula in place.   Pulmonary:      Effort: Pulmonary effort is normal. No accessory muscle usage or respiratory distress. She is not intubated.   Musculoskeletal:         General: No swelling or tenderness.      Right lower leg: No edema.      Left lower leg: No edema.   Skin:     General: Skin is warm and dry.      Coloration: Skin is not jaundiced.   Neurological:      Mental Status: She is alert and oriented to person, place, and time. Mental status is at baseline.      Motor: No seizure activity.   Psychiatric:         Attention and Perception: Attention normal.         Behavior: Behavior normal. Behavior is not aggressive or combative.             Significant Labs: All pertinent labs within the past 24 hours have been reviewed.    Significant Imaging: I have reviewed all pertinent imaging results/findings within the past 24 hours.

## 2024-02-26 ENCOUNTER — TELEPHONE (OUTPATIENT)
Dept: PRIMARY CARE CLINIC | Facility: CLINIC | Age: 74
End: 2024-02-26
Payer: MEDICARE

## 2024-02-26 LAB
ALBUMIN SERPL BCP-MCNC: 1.9 G/DL (ref 3.5–5.2)
ALP SERPL-CCNC: 91 U/L (ref 55–135)
ALT SERPL W/O P-5'-P-CCNC: 5 U/L (ref 10–44)
ANION GAP SERPL CALC-SCNC: 14 MMOL/L (ref 8–16)
AST SERPL-CCNC: 14 U/L (ref 10–40)
BASOPHILS # BLD AUTO: 0.04 K/UL (ref 0–0.2)
BASOPHILS NFR BLD: 0.3 % (ref 0–1.9)
BILIRUB SERPL-MCNC: 0.3 MG/DL (ref 0.1–1)
BUN SERPL-MCNC: 65 MG/DL (ref 8–23)
CALCIUM SERPL-MCNC: 9.4 MG/DL (ref 8.7–10.5)
CHLORIDE SERPL-SCNC: 99 MMOL/L (ref 95–110)
CO2 SERPL-SCNC: 21 MMOL/L (ref 23–29)
CREAT SERPL-MCNC: 3.6 MG/DL (ref 0.5–1.4)
DIFFERENTIAL METHOD BLD: ABNORMAL
EOSINOPHIL # BLD AUTO: 0.1 K/UL (ref 0–0.5)
EOSINOPHIL NFR BLD: 0.6 % (ref 0–8)
ERYTHROCYTE [DISTWIDTH] IN BLOOD BY AUTOMATED COUNT: 22 % (ref 11.5–14.5)
EST. GFR  (NO RACE VARIABLE): 12.8 ML/MIN/1.73 M^2
GLUCOSE SERPL-MCNC: 199 MG/DL (ref 70–110)
HCT VFR BLD AUTO: 27.2 % (ref 37–48.5)
HGB BLD-MCNC: 8.4 G/DL (ref 12–16)
IMM GRANULOCYTES # BLD AUTO: 0.23 K/UL (ref 0–0.04)
IMM GRANULOCYTES NFR BLD AUTO: 1.8 % (ref 0–0.5)
LYMPHOCYTES # BLD AUTO: 0.7 K/UL (ref 1–4.8)
LYMPHOCYTES NFR BLD: 5.3 % (ref 18–48)
MAGNESIUM SERPL-MCNC: 2.2 MG/DL (ref 1.6–2.6)
MCH RBC QN AUTO: 30.2 PG (ref 27–31)
MCHC RBC AUTO-ENTMCNC: 30.9 G/DL (ref 32–36)
MCV RBC AUTO: 98 FL (ref 82–98)
MONOCYTES # BLD AUTO: 1 K/UL (ref 0.3–1)
MONOCYTES NFR BLD: 7.4 % (ref 4–15)
NEUTROPHILS # BLD AUTO: 10.9 K/UL (ref 1.8–7.7)
NEUTROPHILS NFR BLD: 84.6 % (ref 38–73)
NRBC BLD-RTO: 0 /100 WBC
PHOSPHATE SERPL-MCNC: 1.9 MG/DL (ref 2.7–4.5)
PLATELET # BLD AUTO: 317 K/UL (ref 150–450)
PMV BLD AUTO: 10.3 FL (ref 9.2–12.9)
POCT GLUCOSE: 186 MG/DL (ref 70–110)
POCT GLUCOSE: 283 MG/DL (ref 70–110)
POCT GLUCOSE: 397 MG/DL (ref 70–110)
POTASSIUM SERPL-SCNC: 3.9 MMOL/L (ref 3.5–5.1)
PROT SERPL-MCNC: 6.3 G/DL (ref 6–8.4)
RBC # BLD AUTO: 2.78 M/UL (ref 4–5.4)
SODIUM SERPL-SCNC: 134 MMOL/L (ref 136–145)
WBC # BLD AUTO: 12.92 K/UL (ref 3.9–12.7)

## 2024-02-26 PROCEDURE — 25000003 PHARM REV CODE 250

## 2024-02-26 PROCEDURE — 85025 COMPLETE CBC W/AUTO DIFF WBC: CPT | Performed by: INTERNAL MEDICINE

## 2024-02-26 PROCEDURE — 80100016 HC MAINTENANCE HEMODIALYSIS

## 2024-02-26 PROCEDURE — 84100 ASSAY OF PHOSPHORUS: CPT | Performed by: INTERNAL MEDICINE

## 2024-02-26 PROCEDURE — 63600175 PHARM REV CODE 636 W HCPCS: Performed by: STUDENT IN AN ORGANIZED HEALTH CARE EDUCATION/TRAINING PROGRAM

## 2024-02-26 PROCEDURE — 63600175 PHARM REV CODE 636 W HCPCS

## 2024-02-26 PROCEDURE — 25000003 PHARM REV CODE 250: Performed by: INTERNAL MEDICINE

## 2024-02-26 PROCEDURE — 27000221 HC OXYGEN, UP TO 24 HOURS

## 2024-02-26 PROCEDURE — 63600175 PHARM REV CODE 636 W HCPCS: Performed by: NURSE PRACTITIONER

## 2024-02-26 PROCEDURE — 80053 COMPREHEN METABOLIC PANEL: CPT | Performed by: INTERNAL MEDICINE

## 2024-02-26 PROCEDURE — 94761 N-INVAS EAR/PLS OXIMETRY MLT: CPT

## 2024-02-26 PROCEDURE — 83735 ASSAY OF MAGNESIUM: CPT | Performed by: INTERNAL MEDICINE

## 2024-02-26 PROCEDURE — 90935 HEMODIALYSIS ONE EVALUATION: CPT | Mod: ,,, | Performed by: NURSE PRACTITIONER

## 2024-02-26 PROCEDURE — 36415 COLL VENOUS BLD VENIPUNCTURE: CPT | Performed by: INTERNAL MEDICINE

## 2024-02-26 PROCEDURE — 25000003 PHARM REV CODE 250: Performed by: NURSE PRACTITIONER

## 2024-02-26 PROCEDURE — 20600001 HC STEP DOWN PRIVATE ROOM

## 2024-02-26 PROCEDURE — 25000003 PHARM REV CODE 250: Performed by: HOSPITALIST

## 2024-02-26 PROCEDURE — 99900035 HC TECH TIME PER 15 MIN (STAT)

## 2024-02-26 RX ADMIN — PREGABALIN 75 MG: 75 CAPSULE ORAL at 09:02

## 2024-02-26 RX ADMIN — HEPARIN SODIUM 5000 UNITS: 5000 INJECTION INTRAVENOUS; SUBCUTANEOUS at 03:02

## 2024-02-26 RX ADMIN — Medication 324 MG: at 01:02

## 2024-02-26 RX ADMIN — INSULIN ASPART 1 UNITS: 100 INJECTION, SOLUTION INTRAVENOUS; SUBCUTANEOUS at 09:02

## 2024-02-26 RX ADMIN — DRONABINOL 5 MG: 2.5 CAPSULE ORAL at 05:02

## 2024-02-26 RX ADMIN — AMIODARONE HYDROCHLORIDE 200 MG: 200 TABLET ORAL at 01:02

## 2024-02-26 RX ADMIN — ASPIRIN 81 MG: 81 TABLET, COATED ORAL at 01:02

## 2024-02-26 RX ADMIN — HEPARIN SODIUM 5000 UNITS: 5000 INJECTION INTRAVENOUS; SUBCUTANEOUS at 05:02

## 2024-02-26 RX ADMIN — ATORVASTATIN CALCIUM 40 MG: 40 TABLET, FILM COATED ORAL at 09:02

## 2024-02-26 RX ADMIN — HEPARIN SODIUM 1000 UNITS: 1000 INJECTION, SOLUTION INTRAVENOUS; SUBCUTANEOUS at 12:02

## 2024-02-26 RX ADMIN — PANTOPRAZOLE SODIUM 40 MG: 40 TABLET, DELAYED RELEASE ORAL at 05:02

## 2024-02-26 RX ADMIN — VALSARTAN 20 MG: 40 TABLET, FILM COATED ORAL at 10:02

## 2024-02-26 RX ADMIN — PANTOPRAZOLE SODIUM 40 MG: 40 TABLET, DELAYED RELEASE ORAL at 03:02

## 2024-02-26 RX ADMIN — DRONABINOL 5 MG: 2.5 CAPSULE ORAL at 03:02

## 2024-02-26 RX ADMIN — ERYTHROPOIETIN 3400 UNITS: 4000 INJECTION, SOLUTION INTRAVENOUS; SUBCUTANEOUS at 11:02

## 2024-02-26 RX ADMIN — GUAIFENESIN AND DEXTROMETHORPHAN HYDROBROMIDE 1 TABLET: 600; 30 TABLET, EXTENDED RELEASE ORAL at 05:02

## 2024-02-26 RX ADMIN — VALSARTAN 20 MG: 40 TABLET, FILM COATED ORAL at 01:02

## 2024-02-26 RX ADMIN — METOPROLOL TARTRATE 12.5 MG: 25 TABLET, FILM COATED ORAL at 01:02

## 2024-02-26 RX ADMIN — VENLAFAXINE 37.5 MG: 37.5 TABLET ORAL at 01:02

## 2024-02-26 RX ADMIN — INSULIN ASPART 10 UNITS: 100 INJECTION, SOLUTION INTRAVENOUS; SUBCUTANEOUS at 04:02

## 2024-02-26 RX ADMIN — METOPROLOL TARTRATE 12.5 MG: 25 TABLET, FILM COATED ORAL at 09:02

## 2024-02-26 RX ADMIN — SODIUM CHLORIDE: 9 INJECTION, SOLUTION INTRAVENOUS at 08:02

## 2024-02-26 NOTE — PT/OT/SLP PROGRESS
"Occupational Therapy      Patient Name:  Lily Green   MRN:  [id number]    First attempt at 1100AM and nurse states pt is at dialysis. Second attempt at 15:32, pt found seated in chair eating on oxygen via nasal cannula and family members present in room. Pt nodding head "no" for therapy  at this time and would like to rest. Pt's family member states she's had a difficult time after dialysis this morning. Pt not seen today per pt's request and will follow-up 2/27/24.    NICHOLAS Salamanca  2/26/2024  "

## 2024-02-26 NOTE — PLAN OF CARE
[facility] - Telemetry Stepdown  Discharge Reassessment    Primary Care Provider: Pavel Calixto MD    Expected Discharge Date: 2/29/2024    Patient is not medically ready for discharge at this time.    Interval History: Dialysis today. Awaiting repeat chest X-ray to evaluate improvement of pulmonary edema since weaning oxygen.        Reassessment (most recent)       Discharge Reassessment - 02/26/24 1754          Discharge Reassessment    Assessment Type Discharge Planning Reassessment     Did the patient's condition or plan change since previous assessment? Yes     Discharge Plan A Skilled Nursing Facility     Discharge Plan B Skilled Nursing Facility     DME Needed Upon Discharge  none     Transition of Care Barriers None     Why the patient remains in the hospital Requires continued medical care        Post-Acute Status    Post-Acute Authorization Placement     Post-Acute Placement Status Pending medical clearance/testing     Coverage [employer] MANAGED MEDICARE - [employer] MEDICARE PPO -

## 2024-02-26 NOTE — ASSESSMENT & PLAN NOTE
LHC done. Nonischemic cardiomyopathy. Appreciate Cardiology. Resumed home metoprolol. Started valsartan. Hypoxia has significantly improved. Repeat chest X-ray.

## 2024-02-26 NOTE — TELEPHONE ENCOUNTER
----- Message from Drea Villatoro sent at 2/26/2024  3:23 PM CST -----  Contact: Jad () [phone]  [email or web address]     Patient is calling for Medical Advice regarding:jad  is calling to speak with dr. Calixto about his wife she is hospital now.    How long has patient had these symptoms:n/a    Pharmacy name and phone#:na    Would like response via Jamboolt:  call back  Jad     Comments:

## 2024-02-26 NOTE — PROGRESS NOTES
[facility] H[facility] NOTE     Patient currently on hemodialysis for removal of uremic toxins .     Patient seen and evaluated on hemodialysis, tolerating treatment, see HD flowsheet for vitals and assessments.      No Hypotension, chest pain, shortness of breath, cramping, nausea or vomiting.     Target UF: 1-2 L as tolerated, keep MAP >65.  Continue EPO  Phos 1.9 - okay to liberalize diet, no binders  Patient with c/o shortness of breath, WBC uptrending - will repeat CXR    MARILYN Melissa DNP, APRN, FNP-C  Department of Nephrology  [facility] [address]  Pager: [phone]

## 2024-02-26 NOTE — PROGRESS NOTES
[facility] - Telemetry [facility] Medicine  Progress Note    Patient Name: Lily Green  MRN: [id number]  Patient Class: IP- Inpatient   Admission Date: 2/12/2024  Length of Stay: 14 days  Attending Physician: Patrick Guardado MD  Primary Care Provider: Pavel Calixto MD        Subjective:     Principal Problem:Acute hypoxemic respiratory failure        HPI:  Lily Green is a 73 year old white woman with celiac disease, diabetes mellitus type 2 with polyneuropathy, hypertension, hyperlipidemia, history of peptic ulcer disease, depression, anxiety, history of hysterectomy in 1987, history of peritonitis due to diffuse colonic ischemia status post total colectomy and end ileostomy creation on 1/14/2023, evacuation of intraabdominal hematoma, takedown of left lateral ileostomy and recreation of right lateral end ileostomy on 1/16/2023, elective robotic ileostomy reversal and ileocolonic anastomosis on 8/7/2023, irritable bowel syndrome, end stage renal disease status post right internal jugular permanent dialysis catheter on 1/25/2023 removed on 1/28/2023 and replaced with left internal jugular permanent dialysis catheter (on hemodialysis Tuesday Thursday Saturday), right middle lobe carcinoid tumor status post robotic-assisted converted to right thoracotomy with therapeutic wedge resection and mediastinal lymph node dissection and repair of bleeding right inferior pulmonary vein on 1/2/2024, atrial fibrillation, Medtronic Micra AV leadless cardiac pacemaker placement on 1/16/2024. She lives in [place], [place]. She is . Her primary care physician is Dr. Pavel Calixto.               She was hospitalized at [facility] from 1/2/2024 to 2/6/2024 for right middle lobe carcinoid tumor, parapneumonic effusion, atrial fibrillation with rapid ventricular response, tachy-eulogio syndrome, gastrointestinal bleeding while on heparin. She was discharged to [facility]  [facility] nursing [facility]. She was prescribed cefpodoxime and metronidazole for 13 days, fluconazole for 7 days, metoprolol tartrate, and venlafaxine.               She presented to [facility] Emergency Department on 2/12/2024 from the [facility] nursing [facility] with increased work of breathing, dyspnea, and hypoxia with oxygen saturation of 70% on 2 liters/minute of supplemental oxygen. It occurred acutely on the day of presentation. Emergency medical services put her on CPAP with some improvement. She was put on BiPAP in the emergency department. She had tachycardia and tachypnea. Her last dialysis session was 2 days prior on Saturday. She reported cough and nausea. Labs showed BNP 2754 pg/mL, troponin 0.312 ng/mL. Venous blood gas showed pH 7.438, pCO2 38.9. EKG showed sinus tachycardia. Chest X-ray showed pleural effusions and possible multifocal pneumonia. Nephrology was consulted for emergent dialysis with goal of removing 3 liters. She was admitted to Critical Care Medicine.    Overview/Hospital Course:  Echocardiogram revealed new low left ventricular ejection fraction. Troponins remained elevated. Cardiology was consulted. She developed atrial fibrillation with rapid ventricular response on 2/13/2024. She was started on amiodarone infusion. She underwent slow low-efficiency dialysis (SLED). CT showed fluid collections in the right paraesophageal space and azygoesophageal recess. Cardiothoracic Surgery was consulted and recommended Interventional Radiology consult for drainage, but there was no safe window for approach. Interventional Cardiology deferred angiography due to respiratory failure. Palliative Care was consulted. Dialysis removed further fluid. Metoprolol was increased. Thoracentesis was performed on 2/15/2024 removing 1.45 liters, which was sent for analysis. She developed re-expansion pulmonary edema and was started on Airvo high flow oxygen. Airvo was weaned  down to 50 liters/minute and FiO2 40% on 2/16/2024. Pleural effusion was transudative. She was started on vasopressor support. She was weaned to high flow nasal cannula at 12 liters/minute on 2/17/2024. She tolerated SLED without norepinephrine. She was weaned to 4 liters/minute on 2/18/2024. She was transferred to [facility] Medicine Team W on 2/20/2024. She was started on nifedipine. She worked with Physical and Occupational Therapy. Interventional Cardiology was reconsulted. Left heart catheterization showed luminal irregularities. Interventional Cardiology recommended starting aspirin 81 mg daily and atorvastatin 10 mg daily and consulting Heart Transplant Service (HTS) for management of nonischemic cardiomyopathy. She was already on atorvastatin 40 mg daily. Palliative Care signed off and planned to see her outpatient. She continued to require 4 liters/minute of supplemental oxygen. She was still short of breath. HTS was consulted and they said General Cardiology could be consulted. Cardiology recommended stopping nifedipine, starting valsartan, and resuming her metoprolol. Nasal cannula was weaned to 2 liters/minute on 2/25/2024.     Interval History: Dialysis today. Awaiting repeat chest X-ray to evaluate improvement of pulmonary edema since weaning oxygen.     Review of Systems   Constitutional:  Negative for chills and fever.   Gastrointestinal:  Negative for blood in stool.   Neurological:  Negative for syncope and headaches.     Objective:     Vital Signs (Most Recent):  Temp: 97.9 °F (36.6 °C) (02/26/24 1215)  Pulse: 87 (02/26/24 1215)  Resp: 18 (02/26/24 1215)  BP: 139/63 (02/26/24 1345)  SpO2: 100 % (02/26/24 1215) Vital Signs (24h Range):  Temp:  [96.8 °F (36 °C)-98 °F (36.7 °C)] 97.9 °F (36.6 °C)  Pulse:  [72-89] 87  Resp:  [18-22] 18  SpO2:  [98 %-100 %] 100 %  BP: (112-164)/(51-93) 139/63     Weight: 67.6 kg (149 lb 0.5 oz)  Body mass index is 24.8 kg/m².    Intake/Output Summary (Last 24 hours) at  2/26/2024 1407  Last data filed at 2/25/2024 1630  Gross per 24 hour   Intake 240 ml   Output --   Net 240 ml           Physical Exam  Vitals and nursing note reviewed.   Constitutional:       General: She is not in acute distress.     Appearance: She is not diaphoretic.      Interventions: She is not intubated.Nasal cannula in place.   Pulmonary:      Effort: Pulmonary effort is normal. No accessory muscle usage or respiratory distress. She is not intubated.   Musculoskeletal:         General: No swelling or tenderness.      Right lower leg: No edema.      Left lower leg: No edema.   Skin:     General: Skin is warm and dry.      Coloration: Skin is not jaundiced.   Neurological:      Mental Status: She is alert and oriented to person, place, and time. Mental status is at baseline.      Motor: No seizure activity.   Psychiatric:         Attention and Perception: Attention normal.         Behavior: Behavior normal. Behavior is not aggressive or combative.             Significant Labs: All pertinent labs within the past 24 hours have been reviewed.    Significant Imaging: I have reviewed all pertinent imaging results/findings within the past 24 hours.    Assessment/Plan:      * Acute hypoxemic respiratory failure  Weaning supplemental oxygen. Has improved significantly. Dialysis for fluid removal.    Moderate malnutrition  Nutrition consulted. Most recent weight and BMI monitored-     Measurements:  Wt Readings from Last 1 Encounters:   02/20/24 67.6 kg (149 lb 0.5 oz)   Body mass index is 24.8 kg/m².    Patient has been screened and assessed by RD.    Malnutrition Type:  Context: chronic illness  Level: moderate    Malnutrition Characteristic Summary:  Energy Intake (Malnutrition): less than 75% for greater than or equal to 1 month  Muscle Mass (Malnutrition): mild depletion  Fluid Accumulation (Malnutrition): severe    Interventions/Recommendations (treatment strategy):  1.Continue gluten free diet with texture per  SLP 2. modify ONS to [facility] renal TID 3. RD to monitor and follow       Atrial flutter  Started amiodarone. Anticoagulation contraindicated.      Acute decompensated heart failure  LHC done. Nonischemic cardiomyopathy. Appreciate Cardiology. Resumed home metoprolol. Started valsartan. Hypoxia has significantly improved. Repeat chest X-ray.    Secondary hyperparathyroidism  Stable.      Pericardial effusion  Treating fluid overload      Chronic kidney disease-mineral and bone disorder  Continue home sevelamer.    Gastric ulcer  Prescribed PPI to take until 2/23/2024.    Pleural effusion on left  Treating fluid overload.        Atrial fibrillation  Chronic. Home metoprolol changed to amiodarone.    ESRD (end stage renal disease)  Nephrology following.    Anemia in ESRD (end-stage renal disease)  Patient's anemia is currently controlled. Has not received any PRBCs to date. Etiology likely d/t chronic disease due to Chronic Kidney Disease/ESRD  Current CBC reviewed-   Lab Results   Component Value Date    HGB 7.9 (L) 02/21/2024    HCT 25.7 (L) 02/21/2024     Monitor serial CBC and transfuse if patient becomes hemodynamically unstable, symptomatic or H/H drops below 7/21.    Generalized anxiety disorder  Continue home venlafaxine       Hyperlipidemia, mixed  Continue home atorvastatin.      Type 2 diabetes mellitus with diabetic polyneuropathy, without long-term current use of insulin  Patient's FSGs are controlled on current medication regimen.  Last A1c reviewed-   Lab Results   Component Value Date    HGBA1C 4.9 11/15/2023     Most recent fingerstick glucose reviewed-   Recent Labs   Lab 02/20/24  1713 02/20/24  2245 02/21/24  0937   POCTGLUCOSE 214* 220* 189*       Current correctional scale  Low  Maintain anti-hyperglycemic dose as follows-   Antihyperglycemics (From admission, onward)      Start     Stop Route Frequency Ordered    02/15/24 1054  insulin aspart U-100 pen 0-10 Units         -- SubQ Before meals &  nightly PRN 02/15/24 1055          Hold Oral hypoglycemics while patient is in the hospital.      VTE Risk Mitigation (From admission, onward)           Ordered     heparin (porcine) injection 1,000 Units  As needed (PRN)         02/23/24 1521     heparin (porcine) injection 5,000 Units  Every 8 hours         02/12/24 0416     IP VTE HIGH RISK PATIENT  Once         02/12/24 0416     Place sequential compression device  Until discontinued         02/12/24 0416     heparin (porcine) injection 1,000 Units  As needed (PRN)         02/12/24 0229                    Discharge Planning   CAMPBELL: 2/26/2024     Code Status: Full Code   Is the patient medically ready for discharge?: No    Reason for patient still in hospital (select all that apply): Imaging  Discharge Plan A: Skilled Nursing Facility   Discharge Delays: None known at this time              Patrick Guardado MD  Department of Hospital Medicine   [facility] - Telemetry Stepdown

## 2024-02-26 NOTE — SUBJECTIVE & OBJECTIVE
Interval History: Dialysis today. Awaiting repeat chest X-ray to evaluate improvement of pulmonary edema since weaning oxygen.     Review of Systems   Constitutional:  Negative for chills and fever.   Gastrointestinal:  Negative for blood in stool.   Neurological:  Negative for syncope and headaches.     Objective:     Vital Signs (Most Recent):  Temp: 97.9 °F (36.6 °C) (02/26/24 1215)  Pulse: 87 (02/26/24 1215)  Resp: 18 (02/26/24 1215)  BP: 139/63 (02/26/24 1345)  SpO2: 100 % (02/26/24 1215) Vital Signs (24h Range):  Temp:  [96.8 °F (36 °C)-98 °F (36.7 °C)] 97.9 °F (36.6 °C)  Pulse:  [72-89] 87  Resp:  [18-22] 18  SpO2:  [98 %-100 %] 100 %  BP: (112-164)/(51-93) 139/63     Weight: 67.6 kg (149 lb 0.5 oz)  Body mass index is 24.8 kg/m².    Intake/Output Summary (Last 24 hours) at 2/26/2024 1407  Last data filed at 2/25/2024 1630  Gross per 24 hour   Intake 240 ml   Output --   Net 240 ml           Physical Exam  Vitals and nursing note reviewed.   Constitutional:       General: She is not in acute distress.     Appearance: She is not diaphoretic.      Interventions: She is not intubated.Nasal cannula in place.   Pulmonary:      Effort: Pulmonary effort is normal. No accessory muscle usage or respiratory distress. She is not intubated.   Musculoskeletal:         General: No swelling or tenderness.      Right lower leg: No edema.      Left lower leg: No edema.   Skin:     General: Skin is warm and dry.      Coloration: Skin is not jaundiced.   Neurological:      Mental Status: She is alert and oriented to person, place, and time. Mental status is at baseline.      Motor: No seizure activity.   Psychiatric:         Attention and Perception: Attention normal.         Behavior: Behavior normal. Behavior is not aggressive or combative.             Significant Labs: All pertinent labs within the past 24 hours have been reviewed.    Significant Imaging: I have reviewed all pertinent imaging results/findings within the past  24 hours.

## 2024-02-26 NOTE — PLAN OF CARE
Problem: Device-Related Complication Risk (Hemodialysis)  Goal: Safe, Effective Therapy Delivery  Outcome: Ongoing, Progressing     Problem: Hemodynamic Instability (Hemodialysis)  Goal: Effective Tissue Perfusion  Outcome: Ongoing, Progressing     Problem: Infection (Hemodialysis)  Goal: Absence of Infection Signs and Symptoms  Outcome: Ongoing, Progressing     Problem: Adult Inpatient Plan of Care  Goal: Plan of Care Review  Outcome: Ongoing, Progressing  Goal: Patient-Specific Goal (Individualized)  Outcome: Ongoing, Progressing  Goal: Absence of Hospital-Acquired Illness or Injury  Outcome: Ongoing, Progressing  Goal: Optimal Comfort and Wellbeing  Outcome: Ongoing, Progressing  Goal: Readiness for Transition of Care  Outcome: Ongoing, Progressing     Problem: Device-Related Complication Risk (CRRT (Continuous Renal Replacement Therapy))  Goal: Safe, Effective Therapy Delivery  Outcome: Ongoing, Progressing     Problem: Hypothermia (CRRT (Continuous Renal Replacement Therapy))  Goal: Body Temperature Maintained in Desired Range  Outcome: Ongoing, Progressing     Problem: Infection (CRRT (Continuous Renal Replacement Therapy))  Goal: Absence of Infection Signs and Symptoms  Outcome: Ongoing, Progressing     Problem: Diabetes Comorbidity  Goal: Blood Glucose Level Within Targeted Range  Outcome: Ongoing, Progressing     Problem: Fluid Imbalance (Pneumonia)  Goal: Fluid Balance  Outcome: Ongoing, Progressing     Problem: Infection (Pneumonia)  Goal: Resolution of Infection Signs and Symptoms  Outcome: Ongoing, Progressing     Problem: Respiratory Compromise (Pneumonia)  Goal: Effective Oxygenation and Ventilation  Outcome: Ongoing, Progressing     Problem: Infection  Goal: Absence of Infection Signs and Symptoms  Outcome: Ongoing, Progressing     Problem: Impaired Wound Healing  Goal: Optimal Wound Healing  Outcome: Ongoing, Progressing     Problem: Skin Injury Risk Increased  Goal: Skin Health and  Integrity  Outcome: Ongoing, Progressing     Problem: Coping Ineffective  Goal: Effective Coping  Outcome: Ongoing, Progressing   Pt laying in bed resting quietly resp even and unlabored no distress noted at this time denies pain at this time took all meds whole with water tolerates well safety precautions in place she also received dialysis today removed 2 liters

## 2024-02-26 NOTE — PROGRESS NOTES
Dialysis completed. Right IJ tunneled CVC flushed, heparinized,capped and ends wrapped with sterile gauze. Dialyzed for 3.5 hours with fluid removal of 2 liters. Tolerated well with stable vital signs. Returned to her room by veronica.

## 2024-02-26 NOTE — TELEPHONE ENCOUNTER
Pt  asking if you can look at her chart saying not getting any answers, told them to check with the case management and charge nurse. But still wanted me to send you a message

## 2024-02-26 NOTE — PROGRESS NOTES
Patient received in the acute dialysis unit by veronica. Maintenance dialysis started via right IJ tunneled CVC.

## 2024-02-27 LAB
ALBUMIN SERPL BCP-MCNC: 1.9 G/DL (ref 3.5–5.2)
ALP SERPL-CCNC: 92 U/L (ref 55–135)
ALT SERPL W/O P-5'-P-CCNC: 5 U/L (ref 10–44)
ANION GAP SERPL CALC-SCNC: 12 MMOL/L (ref 8–16)
APPEARANCE FLD: NORMAL
AST SERPL-CCNC: 14 U/L (ref 10–40)
BASOPHILS # BLD AUTO: 0.04 K/UL (ref 0–0.2)
BASOPHILS NFR BLD: 0.3 % (ref 0–1.9)
BILIRUB SERPL-MCNC: 0.3 MG/DL (ref 0.1–1)
BODY FLD TYPE: NORMAL
BUN SERPL-MCNC: 34 MG/DL (ref 8–23)
CALCIUM SERPL-MCNC: 9.2 MG/DL (ref 8.7–10.5)
CHLORIDE SERPL-SCNC: 100 MMOL/L (ref 95–110)
CO2 SERPL-SCNC: 23 MMOL/L (ref 23–29)
COLOR FLD: YELLOW
CREAT SERPL-MCNC: 2.1 MG/DL (ref 0.5–1.4)
DIFFERENTIAL METHOD BLD: ABNORMAL
EOSINOPHIL # BLD AUTO: 0.1 K/UL (ref 0–0.5)
EOSINOPHIL NFR BLD: 0.5 % (ref 0–8)
ERYTHROCYTE [DISTWIDTH] IN BLOOD BY AUTOMATED COUNT: 22.1 % (ref 11.5–14.5)
EST. GFR  (NO RACE VARIABLE): 24.4 ML/MIN/1.73 M^2
GLUCOSE SERPL-MCNC: 143 MG/DL (ref 70–110)
GRAM STN SPEC: NORMAL
GRAM STN SPEC: NORMAL
HCT VFR BLD AUTO: 27.5 % (ref 37–48.5)
HGB BLD-MCNC: 8.5 G/DL (ref 12–16)
IMM GRANULOCYTES # BLD AUTO: 0.21 K/UL (ref 0–0.04)
IMM GRANULOCYTES NFR BLD AUTO: 1.8 % (ref 0–0.5)
LYMPHOCYTES # BLD AUTO: 1 K/UL (ref 1–4.8)
LYMPHOCYTES NFR BLD: 8.3 % (ref 18–48)
LYMPHOCYTES NFR FLD MANUAL: 16 %
MAGNESIUM SERPL-MCNC: 2 MG/DL (ref 1.6–2.6)
MCH RBC QN AUTO: 30.7 PG (ref 27–31)
MCHC RBC AUTO-ENTMCNC: 30.9 G/DL (ref 32–36)
MCV RBC AUTO: 99 FL (ref 82–98)
MONOCYTES # BLD AUTO: 1 K/UL (ref 0.3–1)
MONOCYTES NFR BLD: 8.7 % (ref 4–15)
MONOS+MACROS NFR FLD MANUAL: 17 %
NEUTROPHILS # BLD AUTO: 9.5 K/UL (ref 1.8–7.7)
NEUTROPHILS NFR BLD: 80.4 % (ref 38–73)
NEUTROPHILS NFR FLD MANUAL: 67 %
NRBC BLD-RTO: 0 /100 WBC
PHOSPHATE SERPL-MCNC: 1.5 MG/DL (ref 2.7–4.5)
PLATELET # BLD AUTO: 307 K/UL (ref 150–450)
PMV BLD AUTO: 10.8 FL (ref 9.2–12.9)
POCT GLUCOSE: 126 MG/DL (ref 70–110)
POCT GLUCOSE: 169 MG/DL (ref 70–110)
POCT GLUCOSE: 197 MG/DL (ref 70–110)
POTASSIUM SERPL-SCNC: 3.8 MMOL/L (ref 3.5–5.1)
PROT SERPL-MCNC: 6.4 G/DL (ref 6–8.4)
RBC # BLD AUTO: 2.77 M/UL (ref 4–5.4)
SODIUM SERPL-SCNC: 135 MMOL/L (ref 136–145)
WBC # BLD AUTO: 11.78 K/UL (ref 3.9–12.7)
WBC # FLD: 319 /CU MM

## 2024-02-27 PROCEDURE — 63600175 PHARM REV CODE 636 W HCPCS

## 2024-02-27 PROCEDURE — 87206 SMEAR FLUORESCENT/ACID STAI: CPT | Performed by: HOSPITALIST

## 2024-02-27 PROCEDURE — 94664 DEMO&/EVAL PT USE INHALER: CPT

## 2024-02-27 PROCEDURE — 80053 COMPREHEN METABOLIC PANEL: CPT | Performed by: INTERNAL MEDICINE

## 2024-02-27 PROCEDURE — 87075 CULTR BACTERIA EXCEPT BLOOD: CPT | Performed by: HOSPITALIST

## 2024-02-27 PROCEDURE — 85025 COMPLETE CBC W/AUTO DIFF WBC: CPT | Performed by: INTERNAL MEDICINE

## 2024-02-27 PROCEDURE — 25000003 PHARM REV CODE 250: Performed by: HOSPITALIST

## 2024-02-27 PROCEDURE — 97535 SELF CARE MNGMENT TRAINING: CPT | Mod: CO

## 2024-02-27 PROCEDURE — 87205 SMEAR GRAM STAIN: CPT | Performed by: HOSPITALIST

## 2024-02-27 PROCEDURE — 25000003 PHARM REV CODE 250: Performed by: INTERNAL MEDICINE

## 2024-02-27 PROCEDURE — 36415 COLL VENOUS BLD VENIPUNCTURE: CPT | Performed by: INTERNAL MEDICINE

## 2024-02-27 PROCEDURE — 94799 UNLISTED PULMONARY SVC/PX: CPT | Mod: XB

## 2024-02-27 PROCEDURE — 87070 CULTURE OTHR SPECIMN AEROBIC: CPT | Performed by: HOSPITALIST

## 2024-02-27 PROCEDURE — 89051 BODY FLUID CELL COUNT: CPT | Performed by: HOSPITALIST

## 2024-02-27 PROCEDURE — 84100 ASSAY OF PHOSPHORUS: CPT | Performed by: INTERNAL MEDICINE

## 2024-02-27 PROCEDURE — 25000003 PHARM REV CODE 250

## 2024-02-27 PROCEDURE — 97530 THERAPEUTIC ACTIVITIES: CPT | Mod: CO

## 2024-02-27 PROCEDURE — 99900035 HC TECH TIME PER 15 MIN (STAT)

## 2024-02-27 PROCEDURE — 27000646 HC AEROBIKA DEVICE

## 2024-02-27 PROCEDURE — 27000221 HC OXYGEN, UP TO 24 HOURS

## 2024-02-27 PROCEDURE — 94761 N-INVAS EAR/PLS OXIMETRY MLT: CPT

## 2024-02-27 PROCEDURE — 83735 ASSAY OF MAGNESIUM: CPT | Performed by: INTERNAL MEDICINE

## 2024-02-27 PROCEDURE — 87116 MYCOBACTERIA CULTURE: CPT | Performed by: HOSPITALIST

## 2024-02-27 PROCEDURE — 0W9B3ZZ DRAINAGE OF LEFT PLEURAL CAVITY, PERCUTANEOUS APPROACH: ICD-10-PCS | Performed by: STUDENT IN AN ORGANIZED HEALTH CARE EDUCATION/TRAINING PROGRAM

## 2024-02-27 PROCEDURE — 20600001 HC STEP DOWN PRIVATE ROOM

## 2024-02-27 RX ORDER — LIDOCAINE HYDROCHLORIDE 10 MG/ML
INJECTION INFILTRATION; PERINEURAL
Status: COMPLETED | OUTPATIENT
Start: 2024-02-27 | End: 2024-02-27

## 2024-02-27 RX ORDER — SODIUM CHLORIDE 9 MG/ML
INJECTION, SOLUTION INTRAVENOUS ONCE
Status: COMPLETED | OUTPATIENT
Start: 2024-02-28 | End: 2024-02-28

## 2024-02-27 RX ADMIN — Medication 324 MG: at 08:02

## 2024-02-27 RX ADMIN — AMIODARONE HYDROCHLORIDE 200 MG: 200 TABLET ORAL at 08:02

## 2024-02-27 RX ADMIN — ATORVASTATIN CALCIUM 40 MG: 40 TABLET, FILM COATED ORAL at 08:02

## 2024-02-27 RX ADMIN — PANTOPRAZOLE SODIUM 40 MG: 40 TABLET, DELAYED RELEASE ORAL at 05:02

## 2024-02-27 RX ADMIN — VALSARTAN 20 MG: 40 TABLET, FILM COATED ORAL at 08:02

## 2024-02-27 RX ADMIN — METOPROLOL TARTRATE 12.5 MG: 25 TABLET, FILM COATED ORAL at 08:02

## 2024-02-27 RX ADMIN — GUAIFENESIN AND DEXTROMETHORPHAN HYDROBROMIDE 1 TABLET: 600; 30 TABLET, EXTENDED RELEASE ORAL at 08:02

## 2024-02-27 RX ADMIN — PANTOPRAZOLE SODIUM 40 MG: 40 TABLET, DELAYED RELEASE ORAL at 06:02

## 2024-02-27 RX ADMIN — VENLAFAXINE 37.5 MG: 37.5 TABLET ORAL at 08:02

## 2024-02-27 RX ADMIN — INSULIN ASPART 2 UNITS: 100 INJECTION, SOLUTION INTRAVENOUS; SUBCUTANEOUS at 08:02

## 2024-02-27 RX ADMIN — LIDOCAINE HYDROCHLORIDE 10 ML: 10 INJECTION, SOLUTION INFILTRATION; PERINEURAL at 02:02

## 2024-02-27 RX ADMIN — INSULIN ASPART 2 UNITS: 100 INJECTION, SOLUTION INTRAVENOUS; SUBCUTANEOUS at 12:02

## 2024-02-27 RX ADMIN — DRONABINOL 5 MG: 2.5 CAPSULE ORAL at 05:02

## 2024-02-27 NOTE — PT/OT/SLP PROGRESS
Physical Therapy      Patient Name:  Lily Green   MRN:  [id number]    Patient not seen today secondary to Off the floor for procedure/surgery (Pt JAIMN away for thoracentesis x2 attempts (2:55 pm and 4:24 pm)). Will follow-up on next scheduled visit.

## 2024-02-27 NOTE — PLAN OF CARE
Problem: Device-Related Complication Risk (Hemodialysis)  Goal: Safe, Effective Therapy Delivery  Outcome: Ongoing, Progressing     Problem: Hemodynamic Instability (Hemodialysis)  Goal: Effective Tissue Perfusion  Outcome: Ongoing, Progressing     Problem: Infection (Hemodialysis)  Goal: Absence of Infection Signs and Symptoms  Outcome: Ongoing, Progressing     Problem: Adult Inpatient Plan of Care  Goal: Plan of Care Review  Outcome: Ongoing, Progressing  Goal: Patient-Specific Goal (Individualized)  Outcome: Ongoing, Progressing  Goal: Absence of Hospital-Acquired Illness or Injury  Outcome: Ongoing, Progressing  Goal: Optimal Comfort and Wellbeing  Outcome: Ongoing, Progressing  Goal: Readiness for Transition of Care  Outcome: Ongoing, Progressing     Problem: Device-Related Complication Risk (CRRT (Continuous Renal Replacement Therapy))  Goal: Safe, Effective Therapy Delivery  Outcome: Ongoing, Progressing

## 2024-02-27 NOTE — PLAN OF CARE
Patient AAOx3, no distress noted, respirations even and unlabored, will continue to monitor. VSS. Acceptance of education, consents signed, H/P done. Labs reviewed. Patient in IR paracentesis procedure. Warm blankets applied to patient. Patient prepped and draped in sterile fashion.

## 2024-02-27 NOTE — PT/OT/SLP PROGRESS
"Occupational Therapy   Treatment    Name: Lily Green  MRN: [id number]  Admitting Diagnosis:  Acute hypoxemic respiratory failure  6 Days Post-Op    Recommendations:     Discharge Recommendations: Moderate Intensity Therapy  Discharge Equipment Recommendations:  wheelchair  Barriers to discharge:  Other (Comment) (increased (A) required)    Assessment:     Lily Green is a 73 y.o. female with a medical diagnosis of Acute hypoxemic respiratory failure.  She presents with the following performance deficits affecting function are weakness, impaired endurance, impaired cardiopulmonary response to activity, impaired balance, gait instability, decreased safety awareness, decreased lower extremity function, impaired functional mobility, impaired self care skills. Pt demonstrates ability to perform donning/doffing socks with no assistance requiring increased time. She demonstrates increased breathing during functional transfers/static standing trials with HHA and requiring seated rest breaks. Pt appeared very motivated and demonstrated good participatory efforts during today's tx session.    Rehab Prognosis:  Good; patient would benefit from acute skilled OT services to address these deficits and reach maximum level of function.       Plan:     Patient to be seen 4 x/week to address the above listed problems via self-care/home management, therapeutic activities, therapeutic exercises, neuromuscular re-education  Plan of Care Expires: 02/29/24  Plan of Care Reviewed with: patient, spouse    Subjective     Chief Complaint: "sugar was high yesterday, pt states  Patient/Family Comments/goals: Let's do it, pt states (re: standing trials)  Pain/Comfort:  Pain Rating 1: 0/10    Objective:     Communicated with: Nurse (Andie) prior to session.  Patient found up in chair with oxygen, telemetry upon OT entry to room.    General Precautions: Standard, fall    Orthopedic Precautions:N/A  Braces: " N/A  Respiratory Status: Nasal cannula, flow 4 L/min     Occupational Performance:     Bed Mobility:    Not assessed 2/2 pt up in chair     Functional Mobility/Transfers:  Patient completed Sit <> Stand Transfer from bed side chair with moderate assistance  with  no assistive device and hand-held assist x 2 trials  1st trial: MOD A,  40 seconds   One seated rest break taken between trials ~2 minutes  2nd trial: MOD A,  1 minute 15 seconds   `Pulse Ox: 97 % after second standing trial      Activities of Daily Living:  Grooming: pt performed oral care and washed face with washcloth with supervision via setup while seated in chair and bedside table. Then, pt performed combing hair while seated in bedside chair.  Lower Body Dressing: pt performed donning socks on ea lower extremity via figure-4 technique seated in chair and stand by assistance.   Seated rest break taken after donning L sock, then proceeded to don R sock; increased time required  End of session, pt doffed socks with SBA seated in chair; increased time required      AMPAC 6 Click ADL: 16    Treatment & Education:  Pt performed scooting forward in chair with SBA and weight shifted forward (back unsupported in chair )with SBA to perform self-care tasks.  Pt performed bed mobility, functional mobility/transfers, and ADLs as documented above.   Pt educated and instructed on the following:  OT POC  Role of PETERSON  Use of call bell for all assistance  Emphasized the importance of pt performing ADL's to improve performance deficits and decrease burden of care for spouse  Educated spouse and pt the importance of allowing Raisa to perform ADL's as much as possible safely to improve independence and provide assistance as needed  Energy conservation/taking rest breaks as needed  Addressed questions and /or concerns within PETERSON scope of practice  Pt verbalized understanding     Patient left up in chair with all lines intact, call button in reach, RN notified, and spouse  present    GOALS:   Multidisciplinary Problems       Occupational Therapy Goals          Problem: Occupational Therapy    Goal Priority Disciplines Outcome Interventions   Occupational Therapy Goal     OT, PT/OT Ongoing, Progressing    Description: Goals to be met by: 03-04-24     Patient will increase functional independence with ADLs by performing:    Feeding with Supervision.  UE Dressing with Minimal Assistance.   LE Dressing with Moderate Assistance.  Grooming while seated with Supervision. MET 2/27/24  Toileting from bedside commode with Moderate Assistance for hygiene and clothing management.   Supine to sit with Stand-by Assistance.  Stand pivot transfers with Contact Guard Assistance.  Step transfer with Contact Guard Assistance  Toilet transfer to bedside commode with Contact Guard Assistance.  Pt. To be Independent with hEP to BUE to improve level of endurance                         Time Tracking:     OT Date of Treatment: 02/28/24  OT Start Time: 0842  OT Stop Time: 0917  OT Total Time (min): 35 min    Billable Minutes:Self Care/Home Management 16  Therapeutic Activity 19    OT/RICHARD: RICHARD     Number of RICHARD visits since last OT visit: 1    2/27/2024

## 2024-02-27 NOTE — PLAN OF CARE
procedure completed. Patient tolerated well.725ml left pleural fluid removed. Patient AAOx3, no distress noted, respirations even and unlabored, will continue to monitor. VSS. Left procedure site clean, dry, and intact; no bleeding or hematoma noted. Patient to be transferred to room for post-procedural recovery per MD. Report to be given at bedside to RN.

## 2024-02-27 NOTE — PROGRESS NOTES
[facility] - Telemetry Stepdown  Adult Nutrition  Progress Note    SUMMARY       Recommendations    Modify diet to Renal, Gluten Free w/ fluid restriction per MD; continue Novasource ONS.  RD to monitor & follow-up.    Goals: Meet % EEN, EPN by RD f/u  Nutrition Goal Status: progressing towards goal  Communication of RD Recs:  (POC)    Assessment and Plan    Moderate malnutrition    Malnutrition Type:  Context: chronic illness  Level: moderate    Related to (etiology):   Inadequate energy intake    Signs and Symptoms (as evidenced by):   Malnutrition Characteristic Summary:  Energy Intake (Malnutrition): less than 75% for greater than or equal to 1 month  Muscle Mass (Malnutrition): mild depletion  Fluid Accumulation (Malnutrition): severe    Interventions/Recommendations (treatment strategy):  Collaboration of nutrition care w/ other providers  ONS    Nutrition Diagnosis Status:   Continues     Malnutrition Assessment    Malnutrition Context: chronic illness  Malnutrition Level: moderate      Energy Intake (Malnutrition): less than 75% for greater than or equal to 1 month  Muscle Mass (Malnutrition): mild depletion  Fluid Accumulation (Malnutrition): severe   Orbital Region (Subcutaneous Fat Loss): well nourished  Upper Arm Region (Subcutaneous Fat Loss): well nourished   Protestant Region (Muscle Loss): well nourished  Clavicle Bone Region (Muscle Loss): well nourished  Clavicle and Acromion Bone Region (Muscle Loss): well nourished  Patellar Region (Muscle Loss): mild depletion  Anterior Thigh Region (Muscle Loss): mild depletion  Posterior Calf Region (Muscle Loss): mild depletion     Reason for Assessment    Reason For Assessment: RD follow-up  Diagnosis: other (see comments) (Resp. fx)  Relevant Medical History: T2DM, HLD, ESRD, anemia, BIG, CHF  Interdisciplinary Rounds: did not attend    General Information Comments: Pt continues to tolerate diet + ONS w/ 50% PO intake. Pt received HD yesterday. Moderate  "malnutrition diagnosis continues - please see PES statement for details.   Nutrition Discharge Planning: Adequate PO intake    Nutrition/Diet History    Patient Reported Diet/Restrictions/Preferences: gluten-free, renal, diabetic diet  Spiritual, Cultural Beliefs, Caodaism Practices, Values that Affect Care: no  Food Allergies: other (see comments) (Gluten)  Factors Affecting Nutritional Intake: decreased appetite    Anthropometrics    Temp: 97.9 °F (36.6 °C)  Height Method: Stated  Height: 5' 5" (165.1 cm)  Height (inches): 65 in  Weight Method: Bed Scale  Weight: 67.6 kg (149 lb 0.5 oz)  Weight (lb): 149.03 lb  Ideal Body Weight (IBW), Female: 125 lb  % Ideal Body Weight, Female (lb): 119.22 %  BMI (Calculated): 24.8  BMI Grade: 18.5-24.9 - normal    Lab/Procedures/Meds    Pertinent Labs Reviewed: reviewed  Pertinent Labs Comments: Creat 2.1, GFR 24.4  Pertinent Medications Reviewed: reviewed  Pertinent Medications Comments: -    Estimated/Assessed Needs    Weight Used For Calorie Calculations: 67.6 kg (149 lb 0.5 oz)    Energy Calorie Requirements (kcal): 1690 kcal/d  Energy Need Method: Kcal/kg (25 kcal/kg)    Protein Requirements: 81 g/d (1.2 g/kg)  Weight Used For Protein Calculations: 67.6 kg (149 lb 0.5 oz)    Estimated Fluid Requirement Method: other (see comments) (Per MD)  RDA Method (mL): 1690    Nutrition Prescription Ordered    Current Diet Order: Cardiac, Renal  Nutrition Order Comments: 1500 mL FR  Oral Nutrition Supplement: Novasource    Evaluation of Received Nutrient/Fluid Intake    I/O: -12.6L since 2/13    Comments: LBM: 2/24    Tolerance: tolerating    Nutrition Risk    Level of Risk/Frequency of Follow-up:  (1x/week)     Monitor and Evaluation    Food and Nutrient Intake: energy intake, food and beverage intake  Food and Nutrient Adminstration: diet order  Knowledge/Beliefs/Attitudes: beliefs and attitudes  Physical Activity and Function: nutrition-related ADLs and IADLs  Anthropometric " Measurements: height/length, weight, weight change, body mass index  Biochemical Data, Medical Tests and Procedures: electrolyte and renal panel, gastrointestinal profile, glucose/endocrine profile, inflammatory profile, lipid profile  Nutrition-Focused Physical Findings: overall appearance     Nutrition Follow-Up    RD Follow-up?: Yes

## 2024-02-27 NOTE — PROGRESS NOTES
[facility] - Telemetry [facility] Medicine  Progress Note    Patient Name: Lily Green  MRN: [id number]  Patient Class: IP- Inpatient   Admission Date: 2/12/2024  Length of Stay: 15 days  Attending Physician: Patrick Guardado MD  Primary Care Provider: Pavel Calixto MD        Subjective:     Principal Problem:Acute hypoxemic respiratory failure        HPI:  Lily Green is a 73 year old white woman with celiac disease, diabetes mellitus type 2 with polyneuropathy, hypertension, hyperlipidemia, history of peptic ulcer disease, depression, anxiety, history of hysterectomy in 1987, history of peritonitis due to diffuse colonic ischemia status post total colectomy and end ileostomy creation on 1/14/2023, evacuation of intraabdominal hematoma, takedown of left lateral ileostomy and recreation of right lateral end ileostomy on 1/16/2023, elective robotic ileostomy reversal and ileocolonic anastomosis on 8/7/2023, irritable bowel syndrome, end stage renal disease status post right internal jugular permanent dialysis catheter on 1/25/2023 removed on 1/28/2023 and replaced with left internal jugular permanent dialysis catheter (on hemodialysis Tuesday Thursday Saturday), right middle lobe carcinoid tumor status post robotic-assisted converted to right thoracotomy with therapeutic wedge resection and mediastinal lymph node dissection and repair of bleeding right inferior pulmonary vein on 1/2/2024, atrial fibrillation, Medtronic Micra AV leadless cardiac pacemaker placement on 1/16/2024. She lives in [place], [place]. She is . Her primary care physician is Dr. Pavel Calixto.               She was hospitalized at [facility] from 1/2/2024 to 2/6/2024 for right middle lobe carcinoid tumor, parapneumonic effusion, atrial fibrillation with rapid ventricular response, tachy-eulogio syndrome, gastrointestinal bleeding while on heparin. She was discharged to [facility]  [facility] nursing [facility]. She was prescribed cefpodoxime and metronidazole for 13 days, fluconazole for 7 days, metoprolol tartrate, and venlafaxine.               She presented to [facility] Emergency Department on 2/12/2024 from the [facility] nursing [facility] with increased work of breathing, dyspnea, and hypoxia with oxygen saturation of 70% on 2 liters/minute of supplemental oxygen. It occurred acutely on the day of presentation. Emergency medical services put her on CPAP with some improvement. She was put on BiPAP in the emergency department. She had tachycardia and tachypnea. Her last dialysis session was 2 days prior on Saturday. She reported cough and nausea. Labs showed BNP 2754 pg/mL, troponin 0.312 ng/mL. Venous blood gas showed pH 7.438, pCO2 38.9. EKG showed sinus tachycardia. Chest X-ray showed pleural effusions and possible multifocal pneumonia. Nephrology was consulted for emergent dialysis with goal of removing 3 liters. She was admitted to Critical Care Medicine.    Overview/Hospital Course:  Echocardiogram revealed new low left ventricular ejection fraction. Troponins remained elevated. Cardiology was consulted. She developed atrial fibrillation with rapid ventricular response on 2/13/2024. She was started on amiodarone infusion. She underwent slow low-efficiency dialysis (SLED). CT showed fluid collections in the right paraesophageal space and azygoesophageal recess. Cardiothoracic Surgery was consulted and recommended Interventional Radiology consult for drainage, but there was no safe window for approach. Interventional Cardiology deferred angiography due to respiratory failure. Palliative Care was consulted. Dialysis removed further fluid. Metoprolol was increased. Thoracentesis was performed on 2/15/2024 removing 1.45 liters, which was sent for analysis. She developed re-expansion pulmonary edema and was started on Airvo high flow oxygen. Airvo was weaned  down to 50 liters/minute and FiO2 40% on 2/16/2024. Pleural effusion was transudative. She was started on vasopressor support. She was weaned to high flow nasal cannula at 12 liters/minute on 2/17/2024. She tolerated SLED without norepinephrine. She was weaned to 4 liters/minute on 2/18/2024. She was transferred to [facility] Medicine Team W on 2/20/2024. She was started on nifedipine. She worked with Physical and Occupational Therapy. Interventional Cardiology was reconsulted. Left heart catheterization showed luminal irregularities. Interventional Cardiology recommended starting aspirin 81 mg daily and atorvastatin 10 mg daily and consulting Heart Transplant Service (HTS) for management of nonischemic cardiomyopathy. She was already on atorvastatin 40 mg daily. Palliative Care signed off and planned to see her outpatient. She continued to require 4 liters/minute of supplemental oxygen. She was still short of breath. HTS was consulted and they said General Cardiology could be consulted. Cardiology recommended stopping nifedipine, starting valsartan, and resuming her metoprolol. Nasal cannula was weaned to 2 liters/minute on 2/25/2024 but she started coughing. Repeat chest X-ray on 2/26/2024 paradoxically showed slight increase in left pleural effusion, as well as persistent mild pulmonary vascular congestion. Interventional Radiology was consulted for thoracentesis. They removed 725 mL of pleural fluid on 2/27/2024. Fluid was sent for cultures.     Interval History: Thoracentesis today.    Review of Systems   Constitutional:  Negative for chills and fever.   Gastrointestinal:  Negative for abdominal pain and blood in stool.   Neurological:  Negative for syncope and headaches.     Objective:     Vital Signs (Most Recent):  Temp: 97.8 °F (36.6 °C) (02/27/24 1228)  Pulse: 71 (02/27/24 1437)  Resp: 17 (02/27/24 1410)  BP: (!) 122/58 (02/27/24 1410)  SpO2: 100 % (02/27/24 1410) Vital Signs (24h Range):  Temp:  [97.8 °F  (36.6 °C)-99 °F (37.2 °C)] 97.8 °F (36.6 °C)  Pulse:  [68-93] 71  Resp:  [17-20] 17  SpO2:  [99 %-100 %] 100 %  BP: (114-153)/() 122/58     Weight: 67.6 kg (149 lb 0.5 oz)  Body mass index is 24.8 kg/m².    Intake/Output Summary (Last 24 hours) at 2/27/2024 1507  Last data filed at 2/27/2024 1407  Gross per 24 hour   Intake 240 ml   Output 725 ml   Net -485 ml           Physical Exam  Vitals and nursing note reviewed.   Constitutional:       General: She is not in acute distress.     Appearance: She is not diaphoretic.      Interventions: She is not intubated.Nasal cannula in place.   Pulmonary:      Effort: Pulmonary effort is normal. No accessory muscle usage or respiratory distress. She is not intubated.   Musculoskeletal:         General: No swelling or tenderness.      Right lower leg: No edema.      Left lower leg: No edema.   Skin:     General: Skin is warm and dry.      Coloration: Skin is not jaundiced.   Neurological:      Mental Status: She is alert and oriented to person, place, and time. Mental status is at baseline.      Motor: No seizure activity.   Psychiatric:         Attention and Perception: Attention normal.         Behavior: Behavior normal. Behavior is not aggressive or combative.             Significant Labs: All pertinent labs within the past 24 hours have been reviewed.    Significant Imaging: I have reviewed all pertinent imaging results/findings within the past 24 hours.  X-Ray Chest 1 View 2/26/24: FINDINGS:   The right-sided central access catheter and the left sided vascular stent remain in stable positions.  There is a loop recorder present.  There are postoperative changes in the right hemithorax.   The trachea is unremarkable.  There are calcifications of the aortic knob.  The cardiomediastinal silhouette is stable.  There is no evidence of free air beneath the hemidiaphragms.  There is unchanged small right-sided pleural effusion.  There is slight increase in the left-sided  pleural effusion.  There is no evidence of a pneumothorax.  There is no evidence of pneumomediastinum.  The overall aeration of the lung fields are unchanged with mild pulmonary vascular congestion.  There are degenerative changes in the osseous structures.   Impression:  Slight increase in the left-sided pleural effusion.   Otherwise, overall stable cardiopulmonary exam.     Assessment/Plan:      * Acute hypoxemic respiratory failure  Weaning supplemental oxygen. Has improved significantly. Dialysis for fluid removal. Still short of breath. Chest X-ray showed increased left pleural effusion. Appreciate Interventional Radiology for thoracentesis done on 2/27/2024. Do not suspect pulmonary embolism as she is on DVT prophylaxis with heparin.     Moderate malnutrition  Nutrition consulted. Most recent weight and BMI monitored-     Measurements:  Wt Readings from Last 1 Encounters:   02/20/24 67.6 kg (149 lb 0.5 oz)   Body mass index is 24.8 kg/m².    Patient has been screened and assessed by RD.    Malnutrition Type:  Context: chronic illness  Level: moderate    Malnutrition Characteristic Summary:  Energy Intake (Malnutrition): less than 75% for greater than or equal to 1 month  Muscle Mass (Malnutrition): mild depletion  Fluid Accumulation (Malnutrition): severe    Interventions/Recommendations (treatment strategy):  1.Continue gluten free diet with texture per SLP 2. modify ONS to Novasource renal TID 3. RD to monitor and follow       Atrial flutter  Started amiodarone. Anticoagulation contraindicated.      Acute decompensated heart failure  LHC done. Nonischemic cardiomyopathy. Appreciate Cardiology. Resumed home metoprolol. Started valsartan. Hypoxia has significantly improved.     Secondary hyperparathyroidism  Stable.      Pericardial effusion  Treating fluid overload      Chronic kidney disease-mineral and bone disorder  Continue home sevelamer.    Gastric ulcer  Prescribed PPI to take until  2/23/2024.    Pleural effusion on left  Treating fluid overload. Has required therapeutic thoracentesis for it. Increased so getting thoracentesis.    Atrial fibrillation  Chronic. Home metoprolol changed to amiodarone.    ESRD (end stage renal disease)  Nephrology following.    Anemia in ESRD (end-stage renal disease)  Patient's anemia is currently controlled. Has not received any PRBCs to date. Etiology likely d/t chronic disease due to Chronic Kidney Disease/ESRD  Current CBC reviewed-   Lab Results   Component Value Date    HGB 7.9 (L) 02/21/2024    HCT 25.7 (L) 02/21/2024     Monitor serial CBC and transfuse if patient becomes hemodynamically unstable, symptomatic or H/H drops below 7/21.    Generalized anxiety disorder  Continue home venlafaxine       Hyperlipidemia, mixed  Continue home atorvastatin.      Type 2 diabetes mellitus with diabetic polyneuropathy, without long-term current use of insulin  Patient's FSGs are controlled on current medication regimen.  Last A1c reviewed-   Lab Results   Component Value Date    HGBA1C 4.9 11/15/2023     Most recent fingerstick glucose reviewed-   Recent Labs   Lab 02/20/24  1713 02/20/24  2245 02/21/24  0937   POCTGLUCOSE 214* 220* 189*       Current correctional scale  Low  Maintain anti-hyperglycemic dose as follows-   Antihyperglycemics (From admission, onward)      Start     Stop Route Frequency Ordered    02/15/24 1054  insulin aspart U-100 pen 0-10 Units         -- SubQ Before meals & nightly PRN 02/15/24 1055          Hold Oral hypoglycemics while patient is in the hospital.      VTE Risk Mitigation (From admission, onward)           Ordered     heparin (porcine) injection 1,000 Units  As needed (PRN)         02/23/24 1521     IP VTE HIGH RISK PATIENT  Once         02/12/24 0416     Place sequential compression device  Until discontinued         02/12/24 0416     heparin (porcine) injection 1,000 Units  As needed (PRN)         02/12/24 9537                     Discharge Planning   CAMPBELL: 2/29/2024     Code Status: Full Code   Is the patient medically ready for discharge?: No    Reason for patient still in hospital (select all that apply): Patient trending condition and Other (specify) if shortness of breath improves after thoracentesis, will be ready to go to SNF  Discharge Plan A: Skilled Nursing Facility   Discharge Delays: None known at this time              Patrick Guardado MD  Department of Hospital Medicine   [facility] - Telemetry Stepdown

## 2024-02-27 NOTE — TELEPHONE ENCOUNTER
Called and spoke with pt   Subjective   Patient ID: Vanda is a 71 year old female.    Chief Complaint   Patient presents with   • Office Visit   • Medicare Wellness Visit     Subjective:  Presents for Medicare 360 visit.    Anxiety: Reports intermittent panic attacks when she is getting palpitations and requests refill on alprazolam.  Notes she takes this very infrequently-about once a month.    Dyspnea on exertion: Addressed with cardiology--sleep study test ordered.  States she does not sleep well through the night and ex- informed her that she snores throughout the night.  She also notes daytime fatigue.  Denies associated chest pain.    Overactive Bladder: Hx of evaluation with uro-GYN who advised pelvic exercises.  She has been performing without much relief.  Denies dysuria or hematuria.    Hair loss, nail thinning: Desires evaluation with labs    Review of Systems   Constitutional: Negative for chills, fatigue, fever and unexpected weight change.   HENT: Negative for congestion, ear pain, rhinorrhea and sore throat.    Eyes: Negative for pain and visual disturbance.   Respiratory: Positive for shortness of breath. Negative for cough and wheezing.    Cardiovascular: Negative for chest pain and palpitations.   Gastrointestinal: Negative for abdominal pain, blood in stool, constipation, diarrhea, nausea and vomiting.   Endocrine: Negative for cold intolerance and heat intolerance.   Genitourinary: Positive for frequency. Negative for dysuria, hematuria and urgency.   Musculoskeletal: Negative for arthralgias, back pain and myalgias.   Skin: Negative for rash and wound.   Neurological: Negative for dizziness, weakness, light-headedness, numbness and headaches.   Psychiatric/Behavioral: Negative for behavioral problems and sleep disturbance.       Current Problem List:  Patient Active Problem List   Diagnosis   • Angular cheilosis   • Anxiety   • Arthralgia of right hip   • Impacted cerumen, bilateral   • Hair loss   • [place]  nodes   • Hyperlipidemia   • Essential hypertension   • Left shoulder pain   • Left supraspinatus tendinitis   • Chronic lower back pain   • Mild coronary artery disease   • Nevus   • Painful joint   • Palpitations   • Pes anserine bursitis   • Plantar fasciitis   • Screen for colon cancer   • Seborrheic keratosis   • Sessile colonic polyp   • Snoring   • Trigger finger, acquired   • Paroxysmal atrial fibrillation (CMS/HCC)   • Eyelid lesion   • Encounter for anticoagulation discussion and counseling   • Nipple tenderness   • Obesity (BMI 30-39.9)   • Obstructive sleep apnea syndrome   • Panic attacks   • Tendinitis of right shoulder   • OAB (overactive bladder)   • Mixed stress and urge urinary incontinence   • Vaginal atrophy   • Routine health maintenance   • ESTES (dyspnea on exertion)       Past Medical History:   Diagnosis Date   • Anxiety 1/16/2015    Takes alprazolam as needed.   • Essential hypertension 5/28/2013    Meds: Toprol 25 mg daily, Hydralazine 25 mg BID.   • Hyperlipidemia    • Hypertension    • Mild coronary artery disease 05/23/2017    Cannot tolerate Statin. CAD 1990-no PCI- angiogram at this time.    • OAB (overactive bladder) 12/03/2019    Seen by uro-GYN 2019 who advised bladder exercises.   • Paroxysmal atrial fibrillation (CMS/HCC) 02/01/2019    Follows with Dr. Bazzi. Meds: Eliquis 5 mg BID.       Past Surgical History:   Procedure Laterality Date   • Urethra surgery      urethral stretched   • Visceral angiogram          Social History     Tobacco Use   • Smoking status: Never Smoker   • Smokeless tobacco: Never Used   Substance Use Topics   • Alcohol use: Yes     Frequency: Monthly or less     Comment: social drinker   • Drug use: No       Current Medications    APIXABAN (ELIQUIS) 5 MG TAB    Take 1 tablet by mouth every 12 hours.    BIOTIN 2500 MCG CAP    Take by mouth daily.    CHOLECALCIFEROL (VITAMIN D3) 50 MCG (2,000 UNITS) TABLET    Take 50 mcg by mouth daily.    DICLOFENAC  (VOLTAREN) 1 % GEL    Apply 4 g topically 4 times daily as needed (pain). Apply to the affected area.    EVENING PRIMROSE OIL 1000 MG CAP        HYDRALAZINE (APRESOLINE) 25 MG TABLET    Take 1 tablet by mouth 2 times daily.    MAGNESIUM CHLORIDE PO    Take by mouth daily.    MELATONIN 5 MG    Take 5 mg by mouth nightly.    METOPROLOL SUCCINATE (TOPROL-XL) 25 MG 24 HR TABLET    Take 1 tablet by mouth daily.    MISC NATURAL PRODUCTS (OSTEO BI-FLEX TRIPLE STRENGTH PO)    Take by mouth daily.    MULTIPLE VITAMINS-MINERALS (CENTRUM SILVER PO)    Take by mouth daily.    PROBIOTIC PRODUCT (PROBIOTIC + OMEGA-3 PO)    Take by mouth daily.    ZINC METHIONATE 50 MG CAPSULE    Take 50 mg by mouth daily.    ZINC SULFATE PO           Screenings  Pain: 0/10    ADLs: able to perform, clean, bathe, etc    IADLs: able to perform- finances    Depression PHQ2/9:  Little interest or pleasure in activity?: Not at all  Feeling down, depressed or hopeless?: Not at all  Initial depression screening score:: 0  PHQ2 Interpretation: No further screening needed      Depression assessment/plan: Depression screening is negative no further plan needed.     Cognitive/Functional Status: normal      STEADI-Fall Risk : low risk    Hearing Impairment: none    Vision Impairment: none    Vision/Hearing Screening: No exam data present     Speech Impairment: No    Urinary Incontinence:  Over the past 4 weeks how often have you experienced bladder control problems?     Health Maintenance:   -Immunizations: Up to date with Tdap, PCV 13. Needs Shingrex & PPSV 23.   -Pap hx: Neg cotesting 2015, possible hx of abnl several years ago  -Mammogram: Normal 5/2019  -Colon CA Screening: Hx of Cscope about 10 years ago- benign polpys  -Osteoporosis: No history of DEXA scan  -Born between [birth year], Hep C Neg AB    Objective   Vitals:    07/27/20 1753   BP: (!) 167/76   Pulse: (!) 50   Resp: 18   Temp: 98.6 °F (37 °C)   TempSrc: Tympanic   SpO2: 97%   Weight: 111 kg  (244 lb 11.4 oz)   Height: 5' 8.5\" (1.74 m)   PainSc:  0      Physical Exam  Vitals signs reviewed.   Constitutional:       General: She is not in acute distress.     Appearance: She is well-developed.   HENT:      Head: Normocephalic and atraumatic.      Comments: Alopecia     Right Ear: Tympanic membrane and external ear normal.      Left Ear: Tympanic membrane and external ear normal.      Nose: Nose normal.      Mouth/Throat:      Mouth: Mucous membranes are moist.      Pharynx: Oropharynx is clear. No oropharyngeal exudate.   Eyes:      General: No scleral icterus.        Right eye: No discharge.         Left eye: No discharge.      Extraocular Movements: Extraocular movements intact.      Conjunctiva/sclera: Conjunctivae normal.      Pupils: Pupils are equal, round, and reactive to light.   Neck:      Musculoskeletal: Full passive range of motion without pain, normal range of motion and neck supple.      Thyroid: No thyroid mass or thyromegaly.      Trachea: No tracheal tenderness.   Cardiovascular:      Rate and Rhythm: Normal rate and regular rhythm.      Pulses: Normal pulses.      Heart sounds: Normal heart sounds, S1 normal and S2 normal. No murmur.   Pulmonary:      Effort: Pulmonary effort is normal. No respiratory distress.      Breath sounds: Normal breath sounds. No decreased breath sounds, wheezing or rales.   Chest:      Chest wall: No tenderness.   Abdominal:      General: Bowel sounds are normal. There is no distension.      Palpations: Abdomen is soft. There is no mass.      Tenderness: There is no abdominal tenderness. There is no guarding or rebound.   Genitourinary:     Cervix: Normal.      Uterus: Normal.       Adnexa: Right adnexa normal and left adnexa normal.      Comments: Vaginal atrophy  Musculoskeletal: Normal range of motion.         General: No tenderness or deformity.      Right lower leg: No edema.      Left lower leg: No edema.   Lymphadenopathy:      Cervical: No cervical  adenopathy.   Skin:     General: Skin is warm and dry.      Coloration: Skin is not pale.      Findings: No erythema or rash.   Neurological:      Mental Status: She is alert and oriented to person, place, and time.      Cranial Nerves: No cranial nerve deficit.      Sensory: No sensory deficit.      Deep Tendon Reflexes: Reflexes normal.   Psychiatric:         Behavior: Behavior normal.         Thought Content: Thought content normal.         Judgment: Judgment normal.         Assessment     Routine health maintenance  -Immunizations: Up to date with Tdap, PCV 13. Needs Shingrex & PPSV 23.  Ministered PPSV23 today.  Will administer Shingrix next visit when in stock.  -Pap hx: Neg cotesting 2015, possible hx of abnl several unclear if she has had 2 negatives prior to age 65.  Repeat cotesting today and if normal, will not need Paps in the future.  -Mammogram: Normal 5/2019-declines repeat testing this year.  Discussed various guidelines.  She desires to repeat 2021.  -Colon CA Screening: Hx of Cscope about 10 years ago- benign polpys-declines repeat C scope.  Will order FOBT today.  Eventually needs a scope.  -Osteoporosis: will order DEXA scan  -Over 40 years old and overweight- will test for diabetes w/ CMP  -Born between [birth year], Hep C Neg AB  -Discussed advanced directives  -Routine labs as below: CBC, CMP, TSH, lipid panel    Paroxysmal atrial fibrillation (CMS/HCC)  -CHADSVASC Score with Pt-->3--> agreeable to starting Eliquis-ordered per EP  -Rate control: Metoprolol 25 mg daily  -reviewed ECHO with Pt: grade I diastolic dysfunction, EF: 55-60%  -Continue to follow with cardiology & EP    ESTES (dyspnea on exertion)  -Assessed per EP and believes likely underlying MOUSTAPHA--> sleep study ordered per cardiology team  -If worsens, sleep study normal, develops chest pain, would recommend stress test    OAB (overactive bladder)  -Reviewed uro-GYN assessment 2019--> declines seeing again at this time however to  continue pelvic floor exercises until can see again  -Declines medical therapy    Anxiety  -Declines starting daily medication  -Rx alprazolam 0.25 mg to use sparingly.  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 Wilson, last seen earlier this year for telephone visit  -Hx of angiogram 1990 for presumed myocardial infarction, no PCI    Essential hypertension  -Poorly controlled  -Discussed importance of compliance  -Meds: Hydralazine 25 mg twice daily, Toprol 25 mg daily.  Declines increasing or adding medications.  -She has had side effects with every other blood pressure medication including ACE/ARB/calcium channel blockers  -To monitor daily  -Discussed risks of CV disease, CVA, with poor control--- to call cardiology if persistently elevated    Hyperlipidemia  -Declines statin given side effects in the past  -Recheck lipid panel today    Eyelid lesion  -s/p surgical removal 2019-following with ophthalmology        360 Assessment Plan:   Advance Care Planning Addressed: Advance Directives. Advance Care Planning discussed with patient.   Handouts (given to patient): Written handout given.   Health Maintenance (discussed and/or reviewed):   · Patient Education  · Medication needs  · Social Concerns  · Patient does not require behavioral or case management referrals or other disease specific interventions  Patient Education (taught and/or recommended):  · Patient educated on disease process, etiology & prognosis  · Proper usage and side effects of medications reviewed & discussed  · Medical compliance with plan discussed and risks of non-compliance reviewed  · Return to clinic as clinically indicated as discussed with patient who  verbalized understanding of & agreement with the plan    Schedule follow up: in 3 months

## 2024-02-27 NOTE — PROCEDURES
Radiology Post-Procedure Note    Pre Op Diagnosis: Ascites  Post Op Diagnosis: Same    Procedure: Ultrasound Guided  LEFT thoracentesis.     Procedure performed by: Dorothy Beach PA-C    Written Informed Consent Obtained: Yes  Specimen Removed: YES (725 mL yellow, clear)  Estimated Blood Loss: Minimal    Findings:   Successful LEFT thoracentesis.     Patient tolerated procedure well.    Dorothy Beach PA-C  Interventional Radiology  [phone]

## 2024-02-27 NOTE — ASSESSMENT & PLAN NOTE
Weaning supplemental oxygen. Has improved significantly. Dialysis for fluid removal. Still short of breath. Chest X-ray showed increased left pleural effusion. Appreciate Interventional Radiology for thoracentesis done on 2/27/2024. Do not suspect pulmonary embolism as she is on DVT prophylaxis with heparin.

## 2024-02-27 NOTE — SUBJECTIVE & OBJECTIVE
Interval History: Thoracentesis today.    Review of Systems   Constitutional:  Negative for chills and fever.   Gastrointestinal:  Negative for abdominal pain and blood in stool.   Neurological:  Negative for syncope and headaches.     Objective:     Vital Signs (Most Recent):  Temp: 97.8 °F (36.6 °C) (02/27/24 1228)  Pulse: 71 (02/27/24 1437)  Resp: 17 (02/27/24 1410)  BP: (!) 122/58 (02/27/24 1410)  SpO2: 100 % (02/27/24 1410) Vital Signs (24h Range):  Temp:  [97.8 °F (36.6 °C)-99 °F (37.2 °C)] 97.8 °F (36.6 °C)  Pulse:  [68-93] 71  Resp:  [17-20] 17  SpO2:  [99 %-100 %] 100 %  BP: (114-153)/() 122/58     Weight: 67.6 kg (149 lb 0.5 oz)  Body mass index is 24.8 kg/m².    Intake/Output Summary (Last 24 hours) at 2/27/2024 1507  Last data filed at 2/27/2024 1407  Gross per 24 hour   Intake 240 ml   Output 725 ml   Net -485 ml           Physical Exam  Vitals and nursing note reviewed.   Constitutional:       General: She is not in acute distress.     Appearance: She is not diaphoretic.      Interventions: She is not intubated.Nasal cannula in place.   Pulmonary:      Effort: Pulmonary effort is normal. No accessory muscle usage or respiratory distress. She is not intubated.   Musculoskeletal:         General: No swelling or tenderness.      Right lower leg: No edema.      Left lower leg: No edema.   Skin:     General: Skin is warm and dry.      Coloration: Skin is not jaundiced.   Neurological:      Mental Status: She is alert and oriented to person, place, and time. Mental status is at baseline.      Motor: No seizure activity.   Psychiatric:         Attention and Perception: Attention normal.         Behavior: Behavior normal. Behavior is not aggressive or combative.             Significant Labs: All pertinent labs within the past 24 hours have been reviewed.    Significant Imaging: I have reviewed all pertinent imaging results/findings within the past 24 hours.  X-Ray Chest 1 View 2/26/24: FINDINGS:   The  right-sided central access catheter and the left sided vascular stent remain in stable positions.  There is a loop recorder present.  There are postoperative changes in the right hemithorax.   The trachea is unremarkable.  There are calcifications of the aortic knob.  The cardiomediastinal silhouette is stable.  There is no evidence of free air beneath the hemidiaphragms.  There is unchanged small right-sided pleural effusion.  There is slight increase in the left-sided pleural effusion.  There is no evidence of a pneumothorax.  There is no evidence of pneumomediastinum.  The overall aeration of the lung fields are unchanged with mild pulmonary vascular congestion.  There are degenerative changes in the osseous structures.   Impression:  Slight increase in the left-sided pleural effusion.   Otherwise, overall stable cardiopulmonary exam.

## 2024-02-27 NOTE — PLAN OF CARE
Problem: Device-Related Complication Risk (Hemodialysis)  Goal: Safe, Effective Therapy Delivery  Outcome: Ongoing, Progressing     Problem: Hemodynamic Instability (Hemodialysis)  Goal: Effective Tissue Perfusion  Outcome: Ongoing, Progressing     Problem: Infection (Hemodialysis)  Goal: Absence of Infection Signs and Symptoms  Outcome: Ongoing, Progressing     Problem: Adult Inpatient Plan of Care  Goal: Plan of Care Review  Outcome: Ongoing, Progressing  Goal: Patient-Specific Goal (Individualized)  Outcome: Ongoing, Progressing  Goal: Absence of Hospital-Acquired Illness or Injury  Outcome: Ongoing, Progressing  Goal: Optimal Comfort and Wellbeing  Outcome: Ongoing, Progressing  Goal: Readiness for Transition of Care  Outcome: Ongoing, Progressing     Problem: Device-Related Complication Risk (CRRT (Continuous Renal Replacement Therapy))  Goal: Safe, Effective Therapy Delivery  Outcome: Ongoing, Progressing     Problem: Hypothermia (CRRT (Continuous Renal Replacement Therapy))  Goal: Body Temperature Maintained in Desired Range  Outcome: Ongoing, Progressing     Problem: Infection (CRRT (Continuous Renal Replacement Therapy))  Goal: Absence of Infection Signs and Symptoms  Outcome: Ongoing, Progressing     Problem: Diabetes Comorbidity  Goal: Blood Glucose Level Within Targeted Range  Outcome: Ongoing, Progressing     Problem: Fluid Imbalance (Pneumonia)  Goal: Fluid Balance  Outcome: Ongoing, Progressing     Problem: Infection (Pneumonia)  Goal: Resolution of Infection Signs and Symptoms  Outcome: Ongoing, Progressing     Problem: Respiratory Compromise (Pneumonia)  Goal: Effective Oxygenation and Ventilation  Outcome: Ongoing, Progressing     Problem: Infection  Goal: Absence of Infection Signs and Symptoms  Outcome: Ongoing, Progressing     Problem: Impaired Wound Healing  Goal: Optimal Wound Healing  Outcome: Ongoing, Progressing     Problem: Skin Injury Risk Increased  Goal: Skin Health and  Integrity  Outcome: Ongoing, Progressing     Problem: Coping Ineffective  Goal: Effective Coping  Outcome: Ongoing, Progressing   Pt. Had a thoracentesis performed today where 750 ml was pulled off. She is alert and oriented she continues on dialysis Monday, Wednesday, and Friday safety precautions maintained.

## 2024-02-27 NOTE — H&P
Inpatient Radiology Pre-procedure Note    History of Present Illness:  Lily Green is a 73 y.o. female who presents for LEFT US guided Thoracentesis.    Admission H&P reviewed.  Past Medical History:   Diagnosis Date    Anxiety     Celiac disease 2018    Celiac disease     Depression     Diabetes mellitus     Family history of breast cancer in mother      at age 68    Hyperlipidemia     Hypertension     Meniere disease     Meniere's disease, unspecified ear     Menopause     Peptic ulcer     Peritonitis 2023    Reflux esophagitis     Urinary tract infection     Vaginal infection     Vaginal Pap smear 2014    Pap/Hpv Negative      Past Surgical History:   Procedure Laterality Date    APPENDECTOMY      BREAST SURGERY      Tags    CARPAL TUNNEL RELEASE Bilateral 2017    ,     CLOSURE, COLOSTOMY Left 2023    Procedure: CLOSURE, COLOSTOMY;  Surgeon: Manuel Javier MD;  Location: [facility] OR;  Service: General;  Laterality: Left;    COLONOSCOPY  2018    Normal  ( Juan A)     COLOSTOMY Right 2023    Procedure: CREATION, COLOSTOMY;  Surgeon: Manuel Javier MD;  Location: [facility] OR;  Service: General;  Laterality: Right;    CORONARY ANGIOGRAPHY N/A 2024    Procedure: ANGIOGRAM, CORONARY ARTERY;  Surgeon: Todd Carlisle MD;  Location: [facility] CATH LAB;  Service: Cardiology;  Laterality: N/A;    DILATION AND CURETTAGE OF UTERUS  1986    DUPUYTREN CONTRACTURE RELEASE Bilateral 2017    ESOPHAGOGASTRODUODENOSCOPY N/A 2024    Procedure: EGD (ESOPHAGOGASTRODUODENOSCOPY);  Surgeon: Yamilet Walters MD;  Location: [facility] ([address] [place] [address]);  Service: Gastroenterology;  Laterality: N/A;    ESOPHAGOGASTRODUODENOSCOPY N/A 2024    Procedure: EGD (ESOPHAGOGASTRODUODENOSCOPY);  Surgeon: Yamilet Walters MD;  Location: [facility] ENDO (2ND FLR);  Service: Gastroenterology;  Laterality: N/A;    HYSTERECTOMY      BEAU w/ appy, no BSO     IMPLANTATION OF LEADLESS  PACEMAKER N/A 1/16/2024    Procedure: INSERTION, CARDIAC PACEMAKER, LEADLESS;  Surgeon: LENIN Frances MD;  Location: [facility] EP LAB;  Service: Cardiology;  Laterality: N/A;  SB, LEADLESS PPM (MICRA), MDT, ANES, EH, CVICU [id number]    INJECTION OF NEUROLYTIC AGENT AROUND LUMBAR SYMPATHETIC NERVE N/A 1/6/2022    Procedure: BLOCK, LUMBAR SYMPATHETIC;  Surgeon: Souleymane Rizo Jr., MD;  Location: [facility] PAIN MGT;  Service: Pain Management;  Laterality: N/A;  no pacemaker   pt is diabetic     INJECTION OF NEUROLYTIC AGENT AROUND LUMBAR SYMPATHETIC NERVE N/A 8/25/2022    Procedure: BLOCK, LUMBAR SYMPATHETIC;  Surgeon: Souleymane Rizo Jr., MD;  Location: [facility] PAIN MGT;  Service: Pain Management;  Laterality: N/A;  diabetic     INSERTION OF TUNNELED CENTRAL VENOUS HEMODIALYSIS CATHETER Right 1/25/2023    Procedure: INSERTION, CATHETER, HEMODIALYSIS, DUAL LUMEN;  Surgeon: Manuel Javier MD;  Location: [facility] OR;  Service: General;  Laterality: Right;    INSERTION, CATHETER, TUNNELED Left 1/28/2023    Procedure: Insertion,catheter,tunneled;  Surgeon: Watson Fontenot MD;  Location: [facility] OR;  Service: General;  Laterality: Left;    LAPAROTOMY, EXPLORATORY N/A 1/14/2023    Procedure: LAPAROTOMY, EXPLORATORY;  Surgeon: Manuel Javier MD;  Location: [facility] OR;  Service: General;  Laterality: N/A;    LAPAROTOMY, EXPLORATORY N/A 1/16/2023    Procedure: LAPAROTOMY, EXPLORATORY;  Surgeon: Manuel Javier MD;  Location: [facility] OR;  Service: General;  Laterality: N/A;    LEFT HEART CATHETERIZATION Left 2/21/2024    Procedure: Left heart cath;  Surgeon: Todd Carlisle MD;  Location: [facility] CATH LAB;  Service: Cardiology;  Laterality: Left;    LYMPHADENECTOMY Right 1/2/2024    Procedure: LYMPHADENECTOMY;  Surgeon: Tan Thomson MD;  Location: [facility] OR 2ND FLR;  Service: Cardiothoracic;  Laterality: Right;    PACEMAKER, TEMPORARY, TRANSVENOUS, PEDIATRIC Right 1/12/2024    Procedure: Pacemaker, Temporary, Transvenous;  Surgeon:  Todd Carlisle MD;  Location: [facility] CATH LAB;  Service: Cardiology;  Laterality: Right;    REMOVAL OF CATHETER Right 1/28/2023    Procedure: REMOVAL, CATHETER;  Surgeon: Watson Fontenot MD;  Location: [facility] OR;  Service: General;  Laterality: Right;    REMOVAL, TUNNELED CATH Right 1/25/2023    Procedure: REMOVAL, TUNNELED CATH;  Surgeon: Manuel Javier MD;  Location: [facility] OR;  Service: General;  Laterality: Right;    ROBOTIC BRONCHOSCOPY N/A 10/20/2023    Procedure: ROBOTIC BRONCHOSCOPY;  Surgeon: Mayda Monzon MD;  [facility];  Service: Pulmonary;  Laterality: N/A;    SHOULDER SURGERY  2009 & 2010    right rotator cuff    THORACOTOMY Right 1/2/2024    Procedure: THORACOTOMY;  Surgeon: Tan Thomson MD;  Location: [facility] OR [facility] FLR;  Service: Cardiothoracic;  Laterality: Right;    THORACOTOMY, WITH INITIAL THERAPEUTIC WEDGE RESECTION OF LUNG Right 1/2/2024    Procedure: THORACOTOMY, WITH INITIAL THERAPEUTIC WEDGE RESECTION OF LUNG;  Surgeon: Tan Thomson MD;  [facility];  Service: Cardiothoracic;  Laterality: Right;    TONSILLECTOMY      UPPER GASTROINTESTINAL ENDOSCOPY  04/2018       Review of Systems:   As documented in primary team H&P    Home Meds:   Prior to Admission medications    Medication Sig Start Date End Date Taking? Authorizing Provider   B complex-vitamin C-folic acid (MELLISSA-BENJY) 0.8 mg Tab Take 1 tablet (0.8 mg total) by mouth once daily. 3/3/23  Yes Sree Glass NP   insulin aspart U-100 (NOVOLOG) 100 unit/mL (3 mL) InPn pen Inject before meals:  150-200=+1, 201-250=+2, 251-300=+3; 301-350=+4, over 350=+5 units 8/21/23  Yes Sherine Rocha NP   meclizine (ANTIVERT) 25 mg tablet Take 1 tablet (25 mg total) by mouth 3 (three) times daily as needed for Dizziness. 11/22/23  Yes Pavel Calixto MD   midodrine (PROAMATINE) 10 MG tablet Take 1 tablet (10 mg total) by mouth 2 (two) times daily with meals. 2/2/24 2/1/25 Yes Mike Lam MD    pantoprazole (PROTONIX) 40 MG tablet Take 1 tablet (40 mg total) by mouth 2 (two) times daily before meals. 2/2/24 3/23/24 Yes Mike Lam MD   patiromer calcium sorbitex (VELTASSA) 8.4 gram PwPk Take 2 packets (16.8 g total) by mouth once daily. 11/7/23  Yes Matt Vyas MD   sevelamer HCL (RENAGEL) 800 MG Tab Take 2 tablets (1,600 mg total) by mouth 3 (three) times daily with meals. 11/26/23 11/25/24 Yes Steph Steward FNP   torsemide (DEMADEX) 20 MG Tab Take 20 mg by mouth 2 (two) times daily. 2/5/24  Yes Provider, Historical   venlafaxine (EFFEXOR) 37.5 MG Tab Take 1 tablet (37.5 mg total) by mouth once daily. 2/3/24 2/2/25 Yes Mike Lam MD   amiodarone (PACERONE) 200 MG Tab Take 2 tablets (400 mg total) by mouth 2 (two) times daily for 2 days, THEN 1 tablet (200 mg total) 2 (two) times daily. 2/22/24 2/23/25  Patrick Guardado MD   aspirin (ECOTRIN) 81 MG EC tablet Take 1 tablet (81 mg total) by mouth once daily. 2/22/24 2/21/25  Patrick Guardado MD   atorvastatin (LIPITOR) 40 MG tablet Take 1 tablet (40 mg total) by mouth every evening. 2/22/24   Patrick Guardado MD   betahistine HCl (BETAHISTINE, BULK, MISC)  5/1/22   Provider, Historical   blood sugar diagnostic (TRUE METRIX GLUCOSE TEST STRIP) Strp USE ONE STRIP FOR TESTING 2 TIMES A DAY 3/6/23   Sree Glass, NP   blood-glucose meter kit Use to test twice a day 3/6/23 3/5/24  Sree Glass, NP   calcium-vitamin D3 (OS-CIPRIANO 500 + D3) 500 mg-5 mcg (200 unit) per tablet Take 1 tablet by mouth once daily. 3/3/23 3/2/24  Sree Glass, NP   ferrous gluconate (FERGON) 324 MG tablet Take 1 tablet (324 mg total) by mouth daily with breakfast. 3/7/23   Sree Glass NP   lancets Misc 1 lancet by Misc.(Non-Drug; Combo Route) route 4 (four) times daily. 5/29/23   Sherine Rocha NP   NIFEdipine (PROCARDIA-XL) 30 MG (OSM) 24 hr tablet Take 1 tablet (30 mg total) by mouth once daily. 2/23/24 2/22/25  Patrick Guardado MD  "  nutritional supplements Liqd Take 237 mLs by mouth 4 (four) times daily. 3/2/23   Sree Glass NP   pen needle, diabetic (BD ULTRA-FINE MARIS PEN NEEDLE) 32 gauge x 5/32" Ndle 1 Device by Misc.(Non-Drug; Combo Route) route 4 (four) times daily with meals and nightly. 10/2/23   Sherine Rocha NP   pregabalin (LYRICA) 75 MG capsule Take 1 capsule (75 mg total) by mouth every other day. Give after HD 2/22/24   Patrick Guardado MD   vit C,J-Bo-dkxew-lutein-zeaxan (PRESERVISION AREDS 2) 826-646-52-1 mg-unit-mg-mg Cap     Provider, Historical     Scheduled Meds:    [START ON 2/28/2024] sodium chloride 0.9%   Intravenous Once    amiodarone  200 mg Oral Daily    atorvastatin  40 mg Oral QHS    dextromethorphan-guaiFENesin  mg  1 tablet Oral BID    droNABinol  5 mg Oral BID AC    epoetin noble  50 Units/kg Subcutaneous Every Mon, Wed, Fri    ferrous gluconate  324 mg Oral Daily with breakfast    metoprolol tartrate  12.5 mg Oral BID    pantoprazole  40 mg Oral BID AC    pregabalin  75 mg Oral Every other day    valsartan  20 mg Oral BID    venlafaxine  37.5 mg Oral Daily     Continuous Infusions:   PRN Meds:acetaminophen, acetaminophen, albuterol-ipratropium, dextrose 10%, dextrose 10%, glucagon (human recombinant), glucose, glucose, heparin (porcine), insulin aspart U-100, melatonin, ondansetron, ondansetron, sodium chloride 0.9%, sodium chloride 0.9%  Anticoagulants/Antiplatelets: no anticoagulation    Allergies:   Review of patient's allergies indicates:   Allergen Reactions    Crestor [rosuvastatin] Swelling    Gluten protein      Sedation Hx: have not been any systemic reactions    Vitals:  Temp: 97.8 °F (36.6 °C) (02/27/24 1228)  Pulse: 70 (02/27/24 1342)  Resp: 17 (02/27/24 1342)  BP: (!) 130/48 (02/27/24 1342)  SpO2: 100 % (02/27/24 1342)     Physical Exam:  ASA: 3  Mallampati: n/a    General: no acute distress  Mental Status: alert and oriented to person, place and time  HEENT: normocephalic, " atraumatic  Chest: unlabored breathing  Heart: regular heart rate  Abdomen: nondistended  Extremity: moves all extremities    Plan: LEFT US guided thoracentesis.   Sedation Plan: Local    Dorothy Beach PA-C  Interventional Radiology  [phone]

## 2024-02-27 NOTE — ASSESSMENT & PLAN NOTE
LHC done. Nonischemic cardiomyopathy. Appreciate Cardiology. Resumed home metoprolol. Started valsartan. Hypoxia has significantly improved.

## 2024-02-27 NOTE — ASSESSMENT & PLAN NOTE
Treating fluid overload. Has required therapeutic thoracentesis for it. Increased so getting thoracentesis.

## 2024-02-28 LAB
ALBUMIN SERPL BCP-MCNC: 1.8 G/DL (ref 3.5–5.2)
ALP SERPL-CCNC: 92 U/L (ref 55–135)
ALT SERPL W/O P-5'-P-CCNC: 6 U/L (ref 10–44)
ANION GAP SERPL CALC-SCNC: 12 MMOL/L (ref 8–16)
AST SERPL-CCNC: 14 U/L (ref 10–40)
BASOPHILS # BLD AUTO: 0.03 K/UL (ref 0–0.2)
BASOPHILS NFR BLD: 0.2 % (ref 0–1.9)
BILIRUB SERPL-MCNC: 0.3 MG/DL (ref 0.1–1)
BUN SERPL-MCNC: 54 MG/DL (ref 8–23)
CALCIUM SERPL-MCNC: 9.2 MG/DL (ref 8.7–10.5)
CHLORIDE SERPL-SCNC: 99 MMOL/L (ref 95–110)
CO2 SERPL-SCNC: 23 MMOL/L (ref 23–29)
CREAT SERPL-MCNC: 2.8 MG/DL (ref 0.5–1.4)
DIFFERENTIAL METHOD BLD: ABNORMAL
EOSINOPHIL # BLD AUTO: 0 K/UL (ref 0–0.5)
EOSINOPHIL NFR BLD: 0.3 % (ref 0–8)
ERYTHROCYTE [DISTWIDTH] IN BLOOD BY AUTOMATED COUNT: 21.4 % (ref 11.5–14.5)
EST. GFR  (NO RACE VARIABLE): 17.3 ML/MIN/1.73 M^2
GLUCOSE SERPL-MCNC: 117 MG/DL (ref 70–110)
HCT VFR BLD AUTO: 26.9 % (ref 37–48.5)
HGB BLD-MCNC: 8.1 G/DL (ref 12–16)
IMM GRANULOCYTES # BLD AUTO: 0.14 K/UL (ref 0–0.04)
IMM GRANULOCYTES NFR BLD AUTO: 1.2 % (ref 0–0.5)
LYMPHOCYTES # BLD AUTO: 0.8 K/UL (ref 1–4.8)
LYMPHOCYTES NFR BLD: 6.4 % (ref 18–48)
MAGNESIUM SERPL-MCNC: 2.1 MG/DL (ref 1.6–2.6)
MCH RBC QN AUTO: 29.7 PG (ref 27–31)
MCHC RBC AUTO-ENTMCNC: 30.1 G/DL (ref 32–36)
MCV RBC AUTO: 99 FL (ref 82–98)
MONOCYTES # BLD AUTO: 0.7 K/UL (ref 0.3–1)
MONOCYTES NFR BLD: 5.9 % (ref 4–15)
NEUTROPHILS # BLD AUTO: 10.3 K/UL (ref 1.8–7.7)
NEUTROPHILS NFR BLD: 86 % (ref 38–73)
NRBC BLD-RTO: 0 /100 WBC
PHOSPHATE SERPL-MCNC: 1.9 MG/DL (ref 2.7–4.5)
PLATELET # BLD AUTO: 272 K/UL (ref 150–450)
PMV BLD AUTO: 9.7 FL (ref 9.2–12.9)
POCT GLUCOSE: 122 MG/DL (ref 70–110)
POCT GLUCOSE: 177 MG/DL (ref 70–110)
POCT GLUCOSE: 193 MG/DL (ref 70–110)
POCT GLUCOSE: 220 MG/DL (ref 70–110)
POCT GLUCOSE: 236 MG/DL (ref 70–110)
POCT GLUCOSE: 237 MG/DL (ref 70–110)
POTASSIUM SERPL-SCNC: 4 MMOL/L (ref 3.5–5.1)
PROT SERPL-MCNC: 6.2 G/DL (ref 6–8.4)
RBC # BLD AUTO: 2.73 M/UL (ref 4–5.4)
SODIUM SERPL-SCNC: 134 MMOL/L (ref 136–145)
WBC # BLD AUTO: 12.03 K/UL (ref 3.9–12.7)

## 2024-02-28 PROCEDURE — 80053 COMPREHEN METABOLIC PANEL: CPT | Performed by: INTERNAL MEDICINE

## 2024-02-28 PROCEDURE — 25000003 PHARM REV CODE 250: Performed by: NURSE PRACTITIONER

## 2024-02-28 PROCEDURE — 80100016 HC MAINTENANCE HEMODIALYSIS

## 2024-02-28 PROCEDURE — 85025 COMPLETE CBC W/AUTO DIFF WBC: CPT | Performed by: INTERNAL MEDICINE

## 2024-02-28 PROCEDURE — 25000003 PHARM REV CODE 250

## 2024-02-28 PROCEDURE — 25000003 PHARM REV CODE 250: Performed by: HOSPITALIST

## 2024-02-28 PROCEDURE — 63600175 PHARM REV CODE 636 W HCPCS

## 2024-02-28 PROCEDURE — 94761 N-INVAS EAR/PLS OXIMETRY MLT: CPT

## 2024-02-28 PROCEDURE — 20600001 HC STEP DOWN PRIVATE ROOM

## 2024-02-28 PROCEDURE — 90935 HEMODIALYSIS ONE EVALUATION: CPT | Mod: ,,, | Performed by: NURSE PRACTITIONER

## 2024-02-28 PROCEDURE — 63600175 PHARM REV CODE 636 W HCPCS: Performed by: NURSE PRACTITIONER

## 2024-02-28 PROCEDURE — 84100 ASSAY OF PHOSPHORUS: CPT | Performed by: INTERNAL MEDICINE

## 2024-02-28 PROCEDURE — 63600175 PHARM REV CODE 636 W HCPCS: Performed by: STUDENT IN AN ORGANIZED HEALTH CARE EDUCATION/TRAINING PROGRAM

## 2024-02-28 PROCEDURE — 36415 COLL VENOUS BLD VENIPUNCTURE: CPT | Performed by: INTERNAL MEDICINE

## 2024-02-28 PROCEDURE — 25000003 PHARM REV CODE 250: Performed by: INTERNAL MEDICINE

## 2024-02-28 PROCEDURE — 83735 ASSAY OF MAGNESIUM: CPT | Performed by: INTERNAL MEDICINE

## 2024-02-28 RX ORDER — HYDROXYZINE HYDROCHLORIDE 25 MG/1
25 TABLET, FILM COATED ORAL 3 TIMES DAILY PRN
Status: DISCONTINUED | OUTPATIENT
Start: 2024-02-28 | End: 2024-03-01 | Stop reason: HOSPADM

## 2024-02-28 RX ORDER — AMIODARONE HYDROCHLORIDE 200 MG/1
200 TABLET ORAL 2 TIMES DAILY
Qty: 60 TABLET | Refills: 11
Start: 2024-02-28 | End: 2024-04-16 | Stop reason: SDUPTHER

## 2024-02-28 RX ORDER — VALSARTAN 40 MG/1
20 TABLET ORAL 2 TIMES DAILY
Qty: 30 TABLET | Refills: 11 | Status: SHIPPED | OUTPATIENT
Start: 2024-02-28 | End: 2025-02-27

## 2024-02-28 RX ORDER — METOPROLOL TARTRATE 25 MG/1
12.5 TABLET, FILM COATED ORAL 2 TIMES DAILY
Qty: 30 TABLET | Refills: 11
Start: 2024-02-28 | End: 2024-03-26

## 2024-02-28 RX ADMIN — DRONABINOL 5 MG: 2.5 CAPSULE ORAL at 06:02

## 2024-02-28 RX ADMIN — INSULIN ASPART 1 UNITS: 100 INJECTION, SOLUTION INTRAVENOUS; SUBCUTANEOUS at 09:02

## 2024-02-28 RX ADMIN — Medication 324 MG: at 12:02

## 2024-02-28 RX ADMIN — AMIODARONE HYDROCHLORIDE 200 MG: 200 TABLET ORAL at 12:02

## 2024-02-28 RX ADMIN — INSULIN ASPART 4 UNITS: 100 INJECTION, SOLUTION INTRAVENOUS; SUBCUTANEOUS at 05:02

## 2024-02-28 RX ADMIN — HEPARIN SODIUM 1000 UNITS: 1000 INJECTION, SOLUTION INTRAVENOUS; SUBCUTANEOUS at 11:02

## 2024-02-28 RX ADMIN — VALSARTAN 20 MG: 40 TABLET, FILM COATED ORAL at 09:02

## 2024-02-28 RX ADMIN — PANTOPRAZOLE SODIUM 40 MG: 40 TABLET, DELAYED RELEASE ORAL at 06:02

## 2024-02-28 RX ADMIN — GUAIFENESIN AND DEXTROMETHORPHAN HYDROBROMIDE 1 TABLET: 600; 30 TABLET, EXTENDED RELEASE ORAL at 12:02

## 2024-02-28 RX ADMIN — PANTOPRAZOLE SODIUM 40 MG: 40 TABLET, DELAYED RELEASE ORAL at 04:02

## 2024-02-28 RX ADMIN — HYDROXYZINE HYDROCHLORIDE 25 MG: 25 TABLET, FILM COATED ORAL at 04:02

## 2024-02-28 RX ADMIN — SODIUM CHLORIDE: 9 INJECTION, SOLUTION INTRAVENOUS at 08:02

## 2024-02-28 RX ADMIN — GUAIFENESIN AND DEXTROMETHORPHAN HYDROBROMIDE 1 TABLET: 600; 30 TABLET, EXTENDED RELEASE ORAL at 09:02

## 2024-02-28 RX ADMIN — VENLAFAXINE 37.5 MG: 37.5 TABLET ORAL at 12:02

## 2024-02-28 RX ADMIN — METOPROLOL TARTRATE 12.5 MG: 25 TABLET, FILM COATED ORAL at 12:02

## 2024-02-28 RX ADMIN — ERYTHROPOIETIN 3400 UNITS: 4000 INJECTION, SOLUTION INTRAVENOUS; SUBCUTANEOUS at 10:02

## 2024-02-28 RX ADMIN — DRONABINOL 5 MG: 2.5 CAPSULE ORAL at 04:02

## 2024-02-28 RX ADMIN — INSULIN ASPART 4 UNITS: 100 INJECTION, SOLUTION INTRAVENOUS; SUBCUTANEOUS at 08:02

## 2024-02-28 RX ADMIN — ATORVASTATIN CALCIUM 40 MG: 40 TABLET, FILM COATED ORAL at 09:02

## 2024-02-28 RX ADMIN — IOHEXOL 75 ML: 350 INJECTION, SOLUTION INTRAVENOUS at 11:02

## 2024-02-28 RX ADMIN — METOPROLOL TARTRATE 12.5 MG: 25 TABLET, FILM COATED ORAL at 09:02

## 2024-02-28 RX ADMIN — PREGABALIN 75 MG: 75 CAPSULE ORAL at 09:02

## 2024-02-28 NOTE — PLAN OF CARE
Problem: Device-Related Complication Risk (Hemodialysis)  Goal: Safe, Effective Therapy Delivery  2/28/2024 1754 by Andie Isaac RN  Outcome: Ongoing, Progressing  2/28/2024 1731 by Andie Isaac RN  Outcome: Ongoing, Progressing     Problem: Hemodynamic Instability (Hemodialysis)  Goal: Effective Tissue Perfusion  2/28/2024 1754 by Andie Isaac RN  Outcome: Ongoing, Progressing  2/28/2024 1731 by Andie Isaac RN  Outcome: Ongoing, Progressing     Problem: Infection (Hemodialysis)  Goal: Absence of Infection Signs and Symptoms  2/28/2024 1754 by Andie Isaac RN  Outcome: Ongoing, Progressing  2/28/2024 1731 by Andie Isaac RN  Outcome: Ongoing, Progressing     Problem: Adult Inpatient Plan of Care  Goal: Plan of Care Review  2/28/2024 1754 by Andie Isaac RN  Outcome: Ongoing, Progressing  2/28/2024 1731 by Andie Isaac RN  Outcome: Ongoing, Progressing  Goal: Patient-Specific Goal (Individualized)  2/28/2024 1754 by Andie Isaac RN  Outcome: Ongoing, Progressing  2/28/2024 1731 by Andie Isaac RN  Outcome: Ongoing, Progressing  Goal: Absence of Hospital-Acquired Illness or Injury  2/28/2024 1754 by Andie Isaac RN  Outcome: Ongoing, Progressing  2/28/2024 1731 by Andie Isaac RN  Outcome: Ongoing, Progressing  Goal: Optimal Comfort and Wellbeing  2/28/2024 1754 by Andie Isaac RN  Outcome: Ongoing, Progressing  2/28/2024 1731 by Andie Isaac RN  Outcome: Ongoing, Progressing  Goal: Readiness for Transition of Care  2/28/2024 1754 by Andie Isaac RN  Outcome: Ongoing, Progressing  2/28/2024 1731 by Andie Isaac RN  Outcome: Ongoing, Progressing     Problem: Device-Related Complication Risk (CRRT (Continuous Renal Replacement Therapy))  Goal: Safe, Effective Therapy Delivery  2/28/2024 1754 by Andie Isaac RN  Outcome: Ongoing, Progressing  2/28/2024 1731 by Andie Isaac, RN  Outcome: Ongoing, Progressing     Problem: Hypothermia (CRRT  (Continuous Renal Replacement Therapy))  Goal: Body Temperature Maintained in Desired Range  2/28/2024 1754 by Andie Isaac RN  Outcome: Ongoing, Progressing  2/28/2024 1731 by Andie Isaac RN  Outcome: Ongoing, Progressing     Problem: Infection (CRRT (Continuous Renal Replacement Therapy))  Goal: Absence of Infection Signs and Symptoms  2/28/2024 1754 by Andie Isaac RN  Outcome: Ongoing, Progressing  2/28/2024 1731 by Andie Isaac RN  Outcome: Ongoing, Progressing     Problem: Diabetes Comorbidity  Goal: Blood Glucose Level Within Targeted Range  2/28/2024 1754 by Andie Isaac RN  Outcome: Ongoing, Progressing  2/28/2024 1731 by Andie Isaac RN  Outcome: Ongoing, Progressing     Problem: Fluid Imbalance (Pneumonia)  Goal: Fluid Balance  2/28/2024 1754 by Andie Isaac RN  Outcome: Ongoing, Progressing  2/28/2024 1731 by Andie Isaac RN  Outcome: Ongoing, Progressing     Problem: Infection (Pneumonia)  Goal: Resolution of Infection Signs and Symptoms  2/28/2024 1754 by Andie Isaac RN  Outcome: Ongoing, Progressing  2/28/2024 1731 by Andie Isaac RN  Outcome: Ongoing, Progressing     Problem: Respiratory Compromise (Pneumonia)  Goal: Effective Oxygenation and Ventilation  2/28/2024 1754 by Andie Isaac RN  Outcome: Ongoing, Progressing  2/28/2024 1731 by Andie Isaac RN  Outcome: Ongoing, Progressing     Problem: Infection  Goal: Absence of Infection Signs and Symptoms  2/28/2024 1754 by Andie Isaac RN  Outcome: Ongoing, Progressing  2/28/2024 1731 by Andie Isaac RN  Outcome: Ongoing, Progressing     Problem: Impaired Wound Healing  Goal: Optimal Wound Healing  2/28/2024 1754 by Andie Isaac RN  Outcome: Ongoing, Progressing  2/28/2024 1731 by Andie Isaac RN  Outcome: Ongoing, Progressing     Problem: Skin Injury Risk Increased  Goal: Skin Health and Integrity  2/28/2024 1754 by Andie Isaac RN  Outcome: Ongoing, Progressing  2/28/2024 1731 by  Andie Isaac, RN  Outcome: Ongoing, Progressing     Problem: Coping Ineffective  Goal: Effective Coping  2/28/2024 1754 by Andie Isaac, RN  Outcome: Ongoing, Progressing  2/28/2024 1731 by Andie Isaac, RN  Outcome: Ongoing, Progressing

## 2024-02-28 NOTE — PROGRESS NOTES
[facility] H[facility]ODIAL[facility] NOTE     Patient currently on hemodialysis for removal of uremic toxins .     Patient seen and evaluated on hemodialysis, tolerating treatment, see HD flowsheet for vitals and assessments.      No Hypotension, chest pain, shortness of breath, cramping, nausea or vomiting.     Target UF: 2 L as tolerated, keep MAP >65  Sp Thoracentesis yesterday with 725 mL removed.   C/o dry cough on exam. No distress noted.   Continue EPO  Phos 1.9 - liberalized diet     MARILYN Melissa DNP, APRN, FNP-C  Department of Nephrology  [facility]  Pager: [phone]

## 2024-02-28 NOTE — PLAN OF CARE
Problem: Device-Related Complication Risk (Hemodialysis)  Goal: Safe, Effective Therapy Delivery  Outcome: Ongoing, Progressing     Problem: Hemodynamic Instability (Hemodialysis)  Goal: Effective Tissue Perfusion  Outcome: Ongoing, Progressing     Problem: Infection (Hemodialysis)  Goal: Absence of Infection Signs and Symptoms  Outcome: Ongoing, Progressing     Problem: Adult Inpatient Plan of Care  Goal: Plan of Care Review  Outcome: Ongoing, Progressing  Goal: Patient-Specific Goal (Individualized)  Outcome: Ongoing, Progressing  Goal: Absence of Hospital-Acquired Illness or Injury  Outcome: Ongoing, Progressing  Goal: Optimal Comfort and Wellbeing  Outcome: Ongoing, Progressing  Goal: Readiness for Transition of Care  Outcome: Ongoing, Progressing     Problem: Device-Related Complication Risk (CRRT (Continuous Renal Replacement Therapy))  Goal: Safe, Effective Therapy Delivery  Outcome: Ongoing, Progressing     Problem: Hypothermia (CRRT (Continuous Renal Replacement Therapy))  Goal: Body Temperature Maintained in Desired Range  Outcome: Ongoing, Progressing     Problem: Infection (CRRT (Continuous Renal Replacement Therapy))  Goal: Absence of Infection Signs and Symptoms  Outcome: Ongoing, Progressing     Problem: Diabetes Comorbidity  Goal: Blood Glucose Level Within Targeted Range  Outcome: Ongoing, Progressing     Problem: Fluid Imbalance (Pneumonia)  Goal: Fluid Balance  Outcome: Ongoing, Progressing     Problem: Infection (Pneumonia)  Goal: Resolution of Infection Signs and Symptoms  Outcome: Ongoing, Progressing     Problem: Respiratory Compromise (Pneumonia)  Goal: Effective Oxygenation and Ventilation  Outcome: Ongoing, Progressing     Problem: Infection  Goal: Absence of Infection Signs and Symptoms  Outcome: Ongoing, Progressing     Problem: Impaired Wound Healing  Goal: Optimal Wound Healing  Outcome: Ongoing, Progressing     Problem: Skin Injury Risk Increased  Goal: Skin Health and  Integrity  Outcome: Ongoing, Progressing     Problem: Coping Ineffective  Goal: Effective Coping  Outcome: Ongoing, Progressing  Pt continues to c/o sob  while on 2l oxygen via [place] o2 sat monitored and range from 97-99% pt has anxiety and md is aware gave prn order for atarax administered tolerated well safety maintained.

## 2024-02-28 NOTE — ASSESSMENT & PLAN NOTE
Weaning supplemental oxygen. Has improved significantly. Dialysis for fluid removal. Appreciate Interventional Radiology for thoracentesis done on 2/27/2024. Still subjectively very short of breath. Get CTA chest to further evaluate.

## 2024-02-28 NOTE — PLAN OF CARE
Problem: Device-Related Complication Risk (Hemodialysis)  Goal: Safe, Effective Therapy Delivery  Outcome: Ongoing, Progressing     Problem: Hemodynamic Instability (Hemodialysis)  Goal: Effective Tissue Perfusion  Outcome: Ongoing, Progressing     Problem: Infection (Hemodialysis)  Goal: Absence of Infection Signs and Symptoms  Outcome: Ongoing, Progressing     Problem: Adult Inpatient Plan of Care  Goal: Plan of Care Review  Outcome: Ongoing, Progressing  Goal: Patient-Specific Goal (Individualized)  Outcome: Ongoing, Progressing  Goal: Absence of Hospital-Acquired Illness or Injury  Outcome: Ongoing, Progressing  Goal: Optimal Comfort and Wellbeing  Outcome: Ongoing, Progressing  Goal: Readiness for Transition of Care  Outcome: Ongoing, Progressing     Problem: Device-Related Complication Risk (CRRT (Continuous Renal Replacement Therapy))  Goal: Safe, Effective Therapy Delivery  Outcome: Ongoing, Progressing     Problem: Hypothermia (CRRT (Continuous Renal Replacement Therapy))  Goal: Body Temperature Maintained in Desired Range  Outcome: Ongoing, Progressing     Problem: Infection (CRRT (Continuous Renal Replacement Therapy))  Goal: Absence of Infection Signs and Symptoms  Outcome: Ongoing, Progressing     Problem: Diabetes Comorbidity  Goal: Blood Glucose Level Within Targeted Range  Outcome: Ongoing, Progressing     Problem: Fluid Imbalance (Pneumonia)  Goal: Fluid Balance  Outcome: Ongoing, Progressing     Problem: Infection (Pneumonia)  Goal: Resolution of Infection Signs and Symptoms  Outcome: Ongoing, Progressing     Problem: Respiratory Compromise (Pneumonia)  Goal: Effective Oxygenation and Ventilation  Outcome: Ongoing, Progressing     Problem: Infection  Goal: Absence of Infection Signs and Symptoms  Outcome: Ongoing, Progressing     Problem: Impaired Wound Healing  Goal: Optimal Wound Healing  Outcome: Ongoing, Progressing     Problem: Skin Injury Risk Increased  Goal: Skin Health and  Integrity  Outcome: Ongoing, Progressing

## 2024-02-28 NOTE — PLAN OF CARE
02/28/24 1253   Post-Acute Status   Post-Acute Authorization Placement   Post-Acute Placement Status Pending payor review/awaiting authorization (if required)     Per MD, likely DC tomorrow. Spoke with Gerri in admissions at [facility] who reports she will submit for insurance auth today.    Lulu Chan, LCSW [facility]  Ext. [phone]

## 2024-02-28 NOTE — SUBJECTIVE & OBJECTIVE
Interval History: Got thoracentesis yesterday. Still on 2 liters/minute of supplemental oxygen. Getting dialysis today.    Review of Systems   Constitutional:  Negative for chills and fever.   Gastrointestinal:  Negative for abdominal pain and blood in stool.   Neurological:  Negative for syncope and headaches.     Objective:     Vital Signs (Most Recent):  Temp: 97 °F (36.1 °C) (02/28/24 0845)  Pulse: 82 (02/28/24 1115)  Resp: 17 (02/28/24 0845)  BP: 121/64 (02/28/24 1115)  SpO2: 100 % (02/28/24 0845) Vital Signs (24h Range):  Temp:  [97 °F (36.1 °C)-98.7 °F (37.1 °C)] 97 °F (36.1 °C)  Pulse:  [] 82  Resp:  [16-18] 17  SpO2:  [92 %-100 %] 100 %  BP: ()/(48-86) 121/64     Weight: 67.6 kg (149 lb 0.5 oz)  Body mass index is 24.8 kg/m².    Intake/Output Summary (Last 24 hours) at 2/28/2024 1148  Last data filed at 2/27/2024 1630  Gross per 24 hour   Intake 480 ml   Output 725 ml   Net -245 ml           Physical Exam  Vitals and nursing note reviewed.   Constitutional:       General: She is not in acute distress.     Appearance: She is not diaphoretic.      Interventions: She is not intubated.Nasal cannula in place.   Pulmonary:      Effort: Pulmonary effort is normal. No accessory muscle usage or respiratory distress. She is not intubated.   Musculoskeletal:         General: No swelling or tenderness.      Right lower leg: No edema.      Left lower leg: No edema.   Skin:     General: Skin is warm and dry.      Coloration: Skin is not jaundiced.   Neurological:      Mental Status: She is alert and oriented to person, place, and time. Mental status is at baseline.      Motor: No seizure activity.   Psychiatric:         Attention and Perception: Attention normal.         Behavior: Behavior normal. Behavior is not aggressive or combative.             Significant Labs: All pertinent labs within the past 24 hours have been reviewed.    Significant Imaging: I have reviewed all pertinent imaging results/findings  within the past 24 hours.  IR Thoracentesis with Imaging 2/27/24: Impression:  Successful ultrasound-guided left thoracentesis with drainage of 750 mL of serous fluid.   X-Ray Chest AP Portable 2/27/24: FINDINGS:   Since the previous exam, there has been left thoracentesis, left pleural effusion has significantly decreased in size.  There is no pneumothorax or other significant s detrimental change in the cardiopulmonary status since the previous exam.

## 2024-02-28 NOTE — ASSESSMENT & PLAN NOTE
Treating fluid overload. Has required therapeutic thoracentesis for it. Increased so got another thoracentesis on 2/27/2024. Subsequent X-ray showed significant decrease in size. No growth so far in cultures.

## 2024-02-28 NOTE — PLAN OF CARE
Problem: Device-Related Complication Risk (Hemodialysis)  Goal: Safe, Effective Therapy Delivery  2/28/2024 1755 by Andie Isaac RN  Outcome: Ongoing, Progressing  2/28/2024 1755 by Andie Isaac RN  Outcome: Ongoing, Progressing  2/28/2024 1754 by Andie Isaac RN  Outcome: Ongoing, Progressing  2/28/2024 1731 by Andie Isaac RN  Outcome: Ongoing, Progressing     Problem: Hemodynamic Instability (Hemodialysis)  Goal: Effective Tissue Perfusion  2/28/2024 1755 by Andie Isaac RN  Outcome: Ongoing, Progressing  2/28/2024 1755 by Andie Isaac RN  Outcome: Ongoing, Progressing  2/28/2024 1754 by Andie Isaac RN  Outcome: Ongoing, Progressing  2/28/2024 1731 by nAdie Isaac RN  Outcome: Ongoing, Progressing     Problem: Infection (Hemodialysis)  Goal: Absence of Infection Signs and Symptoms  2/28/2024 1755 by Andie Isaac RN  Outcome: Ongoing, Progressing  2/28/2024 1755 by Andie Isaac RN  Outcome: Ongoing, Progressing  2/28/2024 1754 by Andie Isaac RN  Outcome: Ongoing, Progressing  2/28/2024 1731 by Andie Isaac RN  Outcome: Ongoing, Progressing     Problem: Adult Inpatient Plan of Care  Goal: Plan of Care Review  2/28/2024 1755 by Andie Isaac RN  Outcome: Ongoing, Progressing  2/28/2024 1755 by Andie Isaac RN  Outcome: Ongoing, Progressing  2/28/2024 1754 by Andie Isaac RN  Outcome: Ongoing, Progressing  2/28/2024 1731 by Andie Isaac RN  Outcome: Ongoing, Progressing  Goal: Patient-Specific Goal (Individualized)  2/28/2024 1755 by Andie Isaac RN  Outcome: Ongoing, Progressing  2/28/2024 1755 by Andie Isaac RN  Outcome: Ongoing, Progressing  2/28/2024 1754 by Andie Isaac RN  Outcome: Ongoing, Progressing  2/28/2024 1731 by Andie Isaac, RN  Outcome: Ongoing, Progressing  Goal: Absence of Hospital-Acquired Illness or Injury  2/28/2024 1755 by Andie Isaac, RN  Outcome: Ongoing, Progressing  2/28/2024 1755 by Andie Isaac,  RN  Outcome: Ongoing, Progressing  2/28/2024 1754 by Andie Isaac RN  Outcome: Ongoing, Progressing  2/28/2024 1731 by Andie Isaac RN  Outcome: Ongoing, Progressing  Goal: Optimal Comfort and Wellbeing  2/28/2024 1755 by Andie Isaac RN  Outcome: Ongoing, Progressing  2/28/2024 1755 by Andie Isaac RN  Outcome: Ongoing, Progressing  2/28/2024 1754 by Andie Isaac RN  Outcome: Ongoing, Progressing  2/28/2024 1731 by Andie Isaac RN  Outcome: Ongoing, Progressing  Goal: Readiness for Transition of Care  2/28/2024 1755 by Andie Isaac RN  Outcome: Ongoing, Progressing  2/28/2024 1755 by Andie Isaac RN  Outcome: Ongoing, Progressing  2/28/2024 1754 by Andie Isaac RN  Outcome: Ongoing, Progressing  2/28/2024 1731 by Andie Isaac RN  Outcome: Ongoing, Progressing     Problem: Device-Related Complication Risk (CRRT (Continuous Renal Replacement Therapy))  Goal: Safe, Effective Therapy Delivery  2/28/2024 1755 by Andie Isaac RN  Outcome: Ongoing, Progressing  2/28/2024 1755 by Andie Isaac RN  Outcome: Ongoing, Progressing  2/28/2024 1754 by Andie Isaac RN  Outcome: Ongoing, Progressing  2/28/2024 1731 by Andie Isaac RN  Outcome: Ongoing, Progressing     Problem: Hypothermia (CRRT (Continuous Renal Replacement Therapy))  Goal: Body Temperature Maintained in Desired Range  2/28/2024 1755 by Andie Isaac RN  Outcome: Ongoing, Progressing  2/28/2024 1755 by Andie Isaac RN  Outcome: Ongoing, Progressing  2/28/2024 1754 by Andie Isaac RN  Outcome: Ongoing, Progressing  2/28/2024 1731 by Andie Isaac RN  Outcome: Ongoing, Progressing     Problem: Infection (CRRT (Continuous Renal Replacement Therapy))  Goal: Absence of Infection Signs and Symptoms  2/28/2024 1755 by Andie Isaac, RN  Outcome: Ongoing, Progressing  2/28/2024 1755 by Andie Isaac RN  Outcome: Ongoing, Progressing  2/28/2024 1754 by Andie Isaac, RN  Outcome: Ongoing,  Progressing  2/28/2024 1731 by Andie Isaac RN  Outcome: Ongoing, Progressing     Problem: Diabetes Comorbidity  Goal: Blood Glucose Level Within Targeted Range  2/28/2024 1755 by Andie Isaac RN  Outcome: Ongoing, Progressing  2/28/2024 1755 by Andie Isaac RN  Outcome: Ongoing, Progressing  2/28/2024 1754 by Andie Isaac RN  Outcome: Ongoing, Progressing  2/28/2024 1731 by Andie Isaac RN  Outcome: Ongoing, Progressing     Problem: Fluid Imbalance (Pneumonia)  Goal: Fluid Balance  2/28/2024 1755 by Andie Isaac RN  Outcome: Ongoing, Progressing  2/28/2024 1755 by Andie Isaac RN  Outcome: Ongoing, Progressing  2/28/2024 1754 by Andie Isaac RN  Outcome: Ongoing, Progressing  2/28/2024 1731 by Andie Isaac RN  Outcome: Ongoing, Progressing     Problem: Infection (Pneumonia)  Goal: Resolution of Infection Signs and Symptoms  2/28/2024 1755 by Andie Isaac RN  Outcome: Ongoing, Progressing  2/28/2024 1755 by Andie Isaac RN  Outcome: Ongoing, Progressing  2/28/2024 1754 by Andie Isaac RN  Outcome: Ongoing, Progressing  2/28/2024 1731 by Andie Isaac RN  Outcome: Ongoing, Progressing     Problem: Respiratory Compromise (Pneumonia)  Goal: Effective Oxygenation and Ventilation  2/28/2024 1755 by Andie Isaac RN  Outcome: Ongoing, Progressing  2/28/2024 1755 by Andie Isaac RN  Outcome: Ongoing, Progressing  2/28/2024 1754 by Andie Isaac RN  Outcome: Ongoing, Progressing  2/28/2024 1731 by Andie Isaac RN  Outcome: Ongoing, Progressing     Problem: Infection  Goal: Absence of Infection Signs and Symptoms  2/28/2024 1755 by Andie Isaac RN  Outcome: Ongoing, Progressing  2/28/2024 1755 by Andie Isaac RN  Outcome: Ongoing, Progressing  2/28/2024 1754 by Andie Isaac, RN  Outcome: Ongoing, Progressing  2/28/2024 1731 by Andie Isaac, RN  Outcome: Ongoing, Progressing     Problem: Impaired Wound Healing  Goal: Optimal Wound  Healing  2/28/2024 1755 by Andie Isaac RN  Outcome: Ongoing, Progressing  2/28/2024 1755 by Andie Isaac RN  Outcome: Ongoing, Progressing  2/28/2024 1754 by Andie Isaac RN  Outcome: Ongoing, Progressing  2/28/2024 1731 by Andie Isaac RN  Outcome: Ongoing, Progressing     Problem: Skin Injury Risk Increased  Goal: Skin Health and Integrity  2/28/2024 1755 by Andie Isaac RN  Outcome: Ongoing, Progressing  2/28/2024 1755 by Andie Isaac RN  Outcome: Ongoing, Progressing  2/28/2024 1754 by Andie Isaac RN  Outcome: Ongoing, Progressing  2/28/2024 1731 by Andie Isaac RN  Outcome: Ongoing, Progressing     Problem: Coping Ineffective  Goal: Effective Coping  2/28/2024 1755 by Andie Isaac RN  Outcome: Ongoing, Progressing  2/28/2024 1755 by Andie Isaac RN  Outcome: Ongoing, Progressing  2/28/2024 1754 by Andie Isaac RN  Outcome: Ongoing, Progressing  2/28/2024 1731 by Andie Isaac RN  Outcome: Ongoing, Progressing   Pt. Currently laying in bed resting quietly resp even and unlabored no distress noted at this time. She continues to have anxiety prn anxiety meds given safety precautions maintained.

## 2024-02-28 NOTE — PT/OT/SLP PROGRESS
Physical Therapy      Patient Name:  Lily Green   MRN:  [id number]    Patient not seen today secondary to Other (Comment) (3 attempts for tx session: 1. Pt JAMIN away for HD at 10:15 amd and 2. then again at 11:55 am; 3 Pt found sitting UIC, however, pt presents anxious with decreased O2 sat (nurse aware) at 3:37 pm). Will follow-up on next scheduled visit.

## 2024-02-28 NOTE — ASSESSMENT & PLAN NOTE
Weaning supplemental oxygen. Has improved significantly. Dialysis for fluid removal. Appreciate Interventional Radiology for thoracentesis done on 2/27/2024. Do not suspect pulmonary embolism as she is on DVT prophylaxis with heparin. Hopefully can wean oxygen further with dialysis, but can also go to SNF on the 2 liters/minute she is on.

## 2024-02-28 NOTE — PROGRESS NOTES
Patient arrived by bed.  Awake, alert and responsive.  No distress noted.  VSS.  HD TX started via RIJ HD catheter.

## 2024-02-28 NOTE — PROGRESS NOTES
[facility] - Telemetry [facility] Medicine  Progress Note    Patient Name: Lily Green  MRN: [id number]  Patient Class: IP- Inpatient   Admission Date: 2/12/2024  Length of Stay: 16 days  Attending Physician: Patrick Guardado MD  Primary Care Provider: Pavel Calixto MD        Subjective:     Principal Problem:Acute hypoxemic respiratory failure        HPI:  Lily Green is a 73 year old white woman with celiac disease, diabetes mellitus type 2 with polyneuropathy, hypertension, hyperlipidemia, history of peptic ulcer disease, depression, anxiety, history of hysterectomy in 1987, history of peritonitis due to diffuse colonic ischemia status post total colectomy and end ileostomy creation on 1/14/2023, evacuation of intraabdominal hematoma, takedown of left lateral ileostomy and recreation of right lateral end ileostomy on 1/16/2023, elective robotic ileostomy reversal and ileocolonic anastomosis on 8/7/2023, irritable bowel syndrome, end stage renal disease status post right internal jugular permanent dialysis catheter on 1/25/2023 removed on 1/28/2023 and replaced with left internal jugular permanent dialysis catheter (on hemodialysis Tuesday Thursday Saturday), right middle lobe carcinoid tumor status post robotic-assisted converted to right thoracotomy with therapeutic wedge resection and mediastinal lymph node dissection and repair of bleeding right inferior pulmonary vein on 1/2/2024, atrial fibrillation, Medtronic Micra AV leadless cardiac pacemaker placement on 1/16/2024. She lives in [place], [place]. She is . Her primary care physician is Dr. Pavel Calixto.               She was hospitalized at [facility] from 1/2/2024 to 2/6/2024 for right middle lobe carcinoid tumor, parapneumonic effusion, atrial fibrillation with rapid ventricular response, tachy-eulogio syndrome, gastrointestinal bleeding while on heparin. She was discharged to [facility] nursing [facility]. She was prescribed cefpodoxime and metronidazole for 13 days, fluconazole for 7 days, metoprolol tartrate, and venlafaxine.               She presented to [facility] Emergency Department on 2/12/2024 from the [facility] nursing [facility] with increased work of breathing, dyspnea, and hypoxia with oxygen saturation of 70% on 2 liters/minute of supplemental oxygen. It occurred acutely on the day of presentation. Emergency medical services put her on CPAP with some improvement. She was put on BiPAP in the emergency department. She had tachycardia and tachypnea. Her last dialysis session was 2 days prior on Saturday. She reported cough and nausea. Labs showed BNP 2754 pg/mL, troponin 0.312 ng/mL. Venous blood gas showed pH 7.438, pCO2 38.9. EKG showed sinus tachycardia. Chest X-ray showed pleural effusions and possible multifocal pneumonia. Nephrology was consulted for emergent dialysis with goal of removing 3 liters. She was admitted to Critical Care Medicine.    Overview/Hospital Course:  Echocardiogram revealed new low left ventricular ejection fraction. Troponins remained elevated. Cardiology was consulted. She developed atrial fibrillation with rapid ventricular response on 2/13/2024. She was started on amiodarone infusion. She underwent slow low-efficiency dialysis (SLED). CT showed fluid collections in the right paraesophageal space and azygoesophageal recess. Cardiothoracic Surgery was consulted and recommended Interventional Radiology consult for drainage, but there was no safe window for approach. Interventional Cardiology deferred angiography due to respiratory failure. Palliative Care was consulted. Dialysis removed further fluid. Metoprolol was increased. Thoracentesis was performed on 2/15/2024 removing 1.45 liters, which was sent for analysis. She developed re-expansion pulmonary edema and was started on Airvo high flow oxygen. Airvo was weaned  down to 50 liters/minute and FiO2 40% on 2/16/2024. Pleural effusion was transudative. She was started on vasopressor support. She was weaned to high flow nasal cannula at 12 liters/minute on 2/17/2024. She tolerated SLED without norepinephrine. She was weaned to 4 liters/minute on 2/18/2024. She was transferred to [facility] Medicine Team W on 2/20/2024. She was started on nifedipine. She worked with Physical and Occupational Therapy. Interventional Cardiology was reconsulted. Left heart catheterization showed luminal irregularities. Interventional Cardiology recommended starting aspirin 81 mg daily and atorvastatin 10 mg daily and consulting Heart Transplant Service (HTS) for management of nonischemic cardiomyopathy. She was already on atorvastatin 40 mg daily. Palliative Care signed off and planned to see her outpatient. She continued to require 4 liters/minute of supplemental oxygen. She was still short of breath. HTS was consulted and they said General Cardiology could be consulted. Cardiology recommended stopping nifedipine, starting valsartan, and resuming her metoprolol. Nasal cannula was weaned to 2 liters/minute on 2/25/2024 but she started coughing. Repeat chest X-ray on 2/26/2024 paradoxically showed slight increase in left pleural effusion, as well as persistent mild pulmonary vascular congestion. Interventional Radiology was consulted for thoracentesis. They removed 725 mL of pleural fluid on 2/27/2024. Fluid was sent for cultures. She continued to feel short of breath with tachypnea on 2/28/2024. Net fluid status since admission was negative 13.8 liters at this point.     Interval History: She continues to feel short of breath and visibly breathing fast.    Review of Systems   Constitutional:  Negative for chills and fever.   Respiratory:  Positive for cough and shortness of breath.    Gastrointestinal:  Negative for abdominal pain and blood in stool.   Neurological:  Negative for syncope and headaches.      Objective:     Vital Signs (Most Recent):  Temp: 97 °F (36.1 °C) (02/28/24 0845)  Pulse: 82 (02/28/24 1254)  Resp: 17 (02/28/24 0845)  BP: 121/64 (02/28/24 1254)  SpO2: 100 % (02/28/24 0845) Vital Signs (24h Range):  Temp:  [97 °F (36.1 °C)-98.7 °F (37.1 °C)] 97 °F (36.1 °C)  Pulse:  [] 82  Resp:  [16-18] 17  SpO2:  [92 %-100 %] 100 %  BP: ()/(48-86) 121/64     Weight: 67.6 kg (149 lb 0.5 oz)  Body mass index is 24.8 kg/m².    Intake/Output Summary (Last 24 hours) at 2/28/2024 1314  Last data filed at 2/27/2024 1630  Gross per 24 hour   Intake 240 ml   Output 725 ml   Net -485 ml           Physical Exam  Vitals and nursing note reviewed.   Constitutional:       General: She is not in acute distress.     Appearance: She is not diaphoretic.      Interventions: She is not intubated.Nasal cannula in place.   Pulmonary:      Effort: Tachypnea present. No respiratory distress. She is not intubated.      Breath sounds: No stridor. No wheezing.   Musculoskeletal:         General: No swelling or tenderness.      Right lower leg: No edema.      Left lower leg: No edema.   Skin:     General: Skin is warm and dry.      Coloration: Skin is not jaundiced.   Neurological:      Mental Status: She is alert and oriented to person, place, and time. Mental status is at baseline.      Motor: No seizure activity.   Psychiatric:         Attention and Perception: Attention normal.         Behavior: Behavior normal. Behavior is not aggressive or combative.             Significant Labs: All pertinent labs within the past 24 hours have been reviewed.    Significant Imaging: I have reviewed all pertinent imaging results/findings within the past 24 hours.  IR Thoracentesis with Imaging 2/27/24: Impression:  Successful ultrasound-guided left thoracentesis with drainage of 750 mL of serous fluid.   X-Ray Chest AP Portable 2/27/24: FINDINGS:   Since the previous exam, there has been left thoracentesis, left pleural effusion has  significantly decreased in size.  There is no pneumothorax or other significant s detrimental change in the cardiopulmonary status since the previous exam.     Assessment/Plan:      * Acute hypoxemic respiratory failure  Weaning supplemental oxygen. Has improved significantly. Dialysis for fluid removal. Appreciate Interventional Radiology for thoracentesis done on 2/27/2024. Still subjectively very short of breath. Get CTA chest to further evaluate.    Moderate malnutrition  Nutrition consulted. Most recent weight and BMI monitored-     Measurements:  Wt Readings from Last 1 Encounters:   02/20/24 67.6 kg (149 lb 0.5 oz)   Body mass index is 24.8 kg/m².    Patient has been screened and assessed by RD.    Malnutrition Type:  Context: chronic illness  Level: moderate    Malnutrition Characteristic Summary:  Energy Intake (Malnutrition): less than 75% for greater than or equal to 1 month  Muscle Mass (Malnutrition): mild depletion  Fluid Accumulation (Malnutrition): severe    Interventions/Recommendations (treatment strategy):  1.Continue gluten free diet with texture per SLP 2. [facility] 3. RD to monitor and follow       Atrial flutter  Started amiodarone. Anticoagulation contraindicated.      Acute decompensated heart failure  LHC done. Nonischemic cardiomyopathy. Appreciate Cardiology. Resumed home metoprolol. Started valsartan. Hypoxia has significantly improved.     Secondary hyperparathyroidism  Stable.      Pericardial effusion  Treating fluid overload      Chronic kidney disease-mineral and bone disorder  Continue home sevelamer.    Gastric ulcer  Prescribed PPI to take until 2/23/2024.    Pleural effusion on left  Treating fluid overload. Has required therapeutic thoracentesis for it. Increased so got another thoracentesis on 2/27/2024. Subsequent X-ray showed significant decrease in size. No growth so far in cultures.    Atrial fibrillation  Chronic. Home metoprolol changed to  amiodarone.    ESRD (end stage renal disease)  Nephrology following.    Anemia in ESRD (end-stage renal disease)  Patient's anemia is currently controlled. Has not received any PRBCs to date. Etiology likely d/t chronic disease due to Chronic Kidney Disease/ESRD  Current CBC reviewed-   Lab Results   Component Value Date    HGB 7.9 (L) 02/21/2024    HCT 25.7 (L) 02/21/2024     Monitor serial CBC and transfuse if patient becomes hemodynamically unstable, symptomatic or H/H drops below 7/21.    Generalized anxiety disorder  Continue home venlafaxine       Hyperlipidemia, mixed  Continue home atorvastatin.      Type 2 diabetes mellitus with diabetic polyneuropathy, without long-term current use of insulin  Patient's FSGs are controlled on current medication regimen.  Last A1c reviewed-   Lab Results   Component Value Date    HGBA1C 4.9 11/15/2023     Most recent fingerstick glucose reviewed-   Recent Labs   Lab 02/20/24  1713 02/20/24  2245 02/21/24  0937   POCTGLUCOSE 214* 220* 189*       Current correctional scale  Low  Maintain anti-hyperglycemic dose as follows-   Antihyperglycemics (From admission, onward)      Start     Stop Route Frequency Ordered    02/15/24 1054  insulin aspart U-100 pen 0-10 Units         -- SubQ Before meals & nightly PRN 02/15/24 1055          Hold Oral hypoglycemics while patient is in the hospital.      VTE Risk Mitigation (From admission, onward)           Ordered     heparin (porcine) injection 1,000 Units  As needed (PRN)         02/23/24 1521     IP VTE HIGH RISK PATIENT  Once         02/12/24 0416     Place sequential compression device  Until discontinued         02/12/24 0416     heparin (porcine) injection 1,000 Units  As needed (PRN)         02/12/24 0229                    Discharge Planning   CAMPBELL: 3/1/2024     Code Status: Full Code   Is the patient medically ready for discharge?: No    Reason for patient still in hospital (select all that apply): Patient trending condition and  Treatment  Discharge Plan A: Skilled Nursing Facility   Discharge Delays: None known at this time              Patrick Guardado MD  Department of Hospital Medicine   [facility] - Telemetry Stepdown

## 2024-02-28 NOTE — SUBJECTIVE & OBJECTIVE
Interval History: Weaned off nasal cannula. Able to stand with a walker and therapist's help.    Review of Systems   Constitutional:  Negative for chills and fever.   Respiratory:  Positive for cough.    Gastrointestinal:  Negative for abdominal pain and blood in stool.   Neurological:  Negative for syncope and headaches.     Objective:     Vital Signs (Most Recent):  Temp: 97 °F (36.1 °C) (02/28/24 0845)  Pulse: 82 (02/28/24 1254)  Resp: 17 (02/28/24 0845)  BP: 121/64 (02/28/24 1254)  SpO2: 100 % (02/28/24 0845) Vital Signs (24h Range):  Temp:  [97 °F (36.1 °C)-98.7 °F (37.1 °C)] 97 °F (36.1 °C)  Pulse:  [] 82  Resp:  [16-18] 17  SpO2:  [92 %-100 %] 100 %  BP: ()/(48-86) 121/64     Weight: 67.6 kg (149 lb 0.5 oz)  Body mass index is 24.8 kg/m².    Intake/Output Summary (Last 24 hours) at 2/28/2024 1314  Last data filed at 2/27/2024 1630  Gross per 24 hour   Intake 240 ml   Output 725 ml   Net -485 ml           Physical Exam  Vitals and nursing note reviewed.   Constitutional:       General: She is not in acute distress.     Appearance: She is not diaphoretic.      Interventions: She is not intubated.Nasal cannula in place.   Pulmonary:      Effort: Tachypnea present. No respiratory distress. She is not intubated.      Breath sounds: No stridor. No wheezing.   Musculoskeletal:         General: No swelling or tenderness.      Right lower leg: No edema.      Left lower leg: No edema.   Skin:     General: Skin is warm and dry.      Coloration: Skin is not jaundiced.   Neurological:      Mental Status: She is alert and oriented to person, place, and time. Mental status is at baseline.      Motor: No seizure activity.   Psychiatric:         Attention and Perception: Attention normal.         Behavior: Behavior normal. Behavior is not aggressive or combative.             Significant Labs: All pertinent labs within the past 24 hours have been reviewed.    Significant Imaging: I have reviewed all pertinent  imaging results/findings within the past 24 hours.  CTA Chest Non-Coronary (PE Studies) 2/29/24: FINDINGS:   Diagnostic quality: Diagnostic.  Some limitations due to respiratory motion artifact in the lung bases.   Pulmonary arteries: The pulmonary arteries are well visualized through the segmental level. No pulmonary embolism is identified.  Subsegmental pulmonary arteries are not well visualized due to motion artifact.   Right heart strain: None.   Lungs and pleura: Interval improvement though persistent right small loculated appearing pleural effusion extending from the apex to lung base left fissural fluid.  Near complete resolution previously seen left large loculated effusion.  Left lower lobe large consolidation concerning for pneumonia.  Additional small bilateral hazy airspace opacities.  Bilateral bronchial wall thickening.  Right lower lobe 7 mm solid nodule (series 2, image 302).  Additional right lower lobe 5 mm nodule (series 2, image 331)..   Mediastinum and barbie: No mediastinal or hilar adenopathy.   Vessels: No atherosclerotic changes in the aorta and coronary arteries.   Heart and pericardium: Heart size is normal.  Small pericardial effusion.   Upper abdomen: Normal.   Bones: Stable degenerative changes.  No acute osseous findings..   Impression:  1. No evidence of pulmonary embolism is identified.  The subsegmental branches are not evaluable due to motion artifact.   2. New left lower lobe consolidation concerning for pneumonia.   3. Interval improvement though persistent right loculated small perfusion.  Near complete resolution of left pleural effusion.   4. Pulmonary nodules as described above.  For multiple <6 mm sub solid nodules, Fleischner Society 2017 guidelines recommend short term follow up non-contrast chest CT in 3-6 months, as these are typically benign in nature (due to etiologies such as infection). For multiple solid nodules with any 6 mm or greater, Fleischner Society guidelines  recommend follow up with non-contrast chest CT at 3-6 months and 18-24 months after discovery.

## 2024-02-29 PROBLEM — J18.9 LEFT LOWER LOBE PNEUMONIA: Status: ACTIVE | Noted: 2024-02-29

## 2024-02-29 PROBLEM — J96.01 ACUTE HYPOXEMIC RESPIRATORY FAILURE: Status: RESOLVED | Noted: 2023-01-15 | Resolved: 2024-02-29

## 2024-02-29 LAB
ALBUMIN SERPL BCP-MCNC: 1.9 G/DL (ref 3.5–5.2)
ALP SERPL-CCNC: 105 U/L (ref 55–135)
ALT SERPL W/O P-5'-P-CCNC: 6 U/L (ref 10–44)
ANION GAP SERPL CALC-SCNC: 13 MMOL/L (ref 8–16)
AST SERPL-CCNC: 15 U/L (ref 10–40)
BASOPHILS # BLD AUTO: 0.04 K/UL (ref 0–0.2)
BASOPHILS NFR BLD: 0.3 % (ref 0–1.9)
BILIRUB SERPL-MCNC: 0.3 MG/DL (ref 0.1–1)
BUN SERPL-MCNC: 39 MG/DL (ref 8–23)
CALCIUM SERPL-MCNC: 9.5 MG/DL (ref 8.7–10.5)
CHLORIDE SERPL-SCNC: 98 MMOL/L (ref 95–110)
CO2 SERPL-SCNC: 21 MMOL/L (ref 23–29)
CREAT SERPL-MCNC: 2.3 MG/DL (ref 0.5–1.4)
DIFFERENTIAL METHOD BLD: ABNORMAL
EOSINOPHIL # BLD AUTO: 0.1 K/UL (ref 0–0.5)
EOSINOPHIL NFR BLD: 0.4 % (ref 0–8)
ERYTHROCYTE [DISTWIDTH] IN BLOOD BY AUTOMATED COUNT: 21.2 % (ref 11.5–14.5)
EST. GFR  (NO RACE VARIABLE): 21.9 ML/MIN/1.73 M^2
GLUCOSE SERPL-MCNC: 160 MG/DL (ref 70–110)
HCT VFR BLD AUTO: 27.4 % (ref 37–48.5)
HGB BLD-MCNC: 8.5 G/DL (ref 12–16)
IMM GRANULOCYTES # BLD AUTO: 0.15 K/UL (ref 0–0.04)
IMM GRANULOCYTES NFR BLD AUTO: 1.3 % (ref 0–0.5)
LYMPHOCYTES # BLD AUTO: 0.8 K/UL (ref 1–4.8)
LYMPHOCYTES NFR BLD: 7.2 % (ref 18–48)
MAGNESIUM SERPL-MCNC: 2.1 MG/DL (ref 1.6–2.6)
MCH RBC QN AUTO: 30.4 PG (ref 27–31)
MCHC RBC AUTO-ENTMCNC: 31 G/DL (ref 32–36)
MCV RBC AUTO: 98 FL (ref 82–98)
MONOCYTES # BLD AUTO: 0.8 K/UL (ref 0.3–1)
MONOCYTES NFR BLD: 6.6 % (ref 4–15)
NEUTROPHILS # BLD AUTO: 9.8 K/UL (ref 1.8–7.7)
NEUTROPHILS NFR BLD: 84.2 % (ref 38–73)
NRBC BLD-RTO: 0 /100 WBC
PHOSPHATE SERPL-MCNC: 1.4 MG/DL (ref 2.7–4.5)
PLATELET # BLD AUTO: 312 K/UL (ref 150–450)
PMV BLD AUTO: 10.8 FL (ref 9.2–12.9)
POCT GLUCOSE: 134 MG/DL (ref 70–110)
POCT GLUCOSE: 166 MG/DL (ref 70–110)
POCT GLUCOSE: 210 MG/DL (ref 70–110)
POCT GLUCOSE: 239 MG/DL (ref 70–110)
POTASSIUM SERPL-SCNC: 3.5 MMOL/L (ref 3.5–5.1)
PROT SERPL-MCNC: 6.9 G/DL (ref 6–8.4)
RBC # BLD AUTO: 2.8 M/UL (ref 4–5.4)
SODIUM SERPL-SCNC: 132 MMOL/L (ref 136–145)
WBC # BLD AUTO: 11.6 K/UL (ref 3.9–12.7)

## 2024-02-29 PROCEDURE — 25500020 PHARM REV CODE 255: Performed by: HOSPITALIST

## 2024-02-29 PROCEDURE — 85025 COMPLETE CBC W/AUTO DIFF WBC: CPT | Performed by: INTERNAL MEDICINE

## 2024-02-29 PROCEDURE — 97530 THERAPEUTIC ACTIVITIES: CPT | Mod: CO

## 2024-02-29 PROCEDURE — 25000003 PHARM REV CODE 250

## 2024-02-29 PROCEDURE — 83735 ASSAY OF MAGNESIUM: CPT | Performed by: INTERNAL MEDICINE

## 2024-02-29 PROCEDURE — 63600175 PHARM REV CODE 636 W HCPCS

## 2024-02-29 PROCEDURE — 97535 SELF CARE MNGMENT TRAINING: CPT | Mod: CO

## 2024-02-29 PROCEDURE — 99900035 HC TECH TIME PER 15 MIN (STAT)

## 2024-02-29 PROCEDURE — 97530 THERAPEUTIC ACTIVITIES: CPT | Mod: CQ

## 2024-02-29 PROCEDURE — 80053 COMPREHEN METABOLIC PANEL: CPT | Performed by: INTERNAL MEDICINE

## 2024-02-29 PROCEDURE — 36415 COLL VENOUS BLD VENIPUNCTURE: CPT | Performed by: INTERNAL MEDICINE

## 2024-02-29 PROCEDURE — 97116 GAIT TRAINING THERAPY: CPT | Mod: CQ

## 2024-02-29 PROCEDURE — 84100 ASSAY OF PHOSPHORUS: CPT | Performed by: INTERNAL MEDICINE

## 2024-02-29 PROCEDURE — 94799 UNLISTED PULMONARY SVC/PX: CPT | Mod: XB

## 2024-02-29 PROCEDURE — 25000003 PHARM REV CODE 250: Performed by: INTERNAL MEDICINE

## 2024-02-29 PROCEDURE — 25000003 PHARM REV CODE 250: Performed by: HOSPITALIST

## 2024-02-29 PROCEDURE — 94761 N-INVAS EAR/PLS OXIMETRY MLT: CPT

## 2024-02-29 PROCEDURE — 94664 DEMO&/EVAL PT USE INHALER: CPT

## 2024-02-29 PROCEDURE — 20600001 HC STEP DOWN PRIVATE ROOM

## 2024-02-29 RX ORDER — DOXYCYCLINE HYCLATE 100 MG
100 TABLET ORAL EVERY 12 HOURS
Status: DISCONTINUED | OUTPATIENT
Start: 2024-02-29 | End: 2024-03-01 | Stop reason: HOSPADM

## 2024-02-29 RX ORDER — SODIUM CHLORIDE 9 MG/ML
INJECTION, SOLUTION INTRAVENOUS ONCE
Status: DISCONTINUED | OUTPATIENT
Start: 2024-03-01 | End: 2024-03-01 | Stop reason: HOSPADM

## 2024-02-29 RX ADMIN — VALSARTAN 20 MG: 40 TABLET, FILM COATED ORAL at 09:02

## 2024-02-29 RX ADMIN — INSULIN ASPART 4 UNITS: 100 INJECTION, SOLUTION INTRAVENOUS; SUBCUTANEOUS at 01:02

## 2024-02-29 RX ADMIN — INSULIN ASPART 2 UNITS: 100 INJECTION, SOLUTION INTRAVENOUS; SUBCUTANEOUS at 09:02

## 2024-02-29 RX ADMIN — INSULIN ASPART 4 UNITS: 100 INJECTION, SOLUTION INTRAVENOUS; SUBCUTANEOUS at 05:02

## 2024-02-29 RX ADMIN — VENLAFAXINE 37.5 MG: 37.5 TABLET ORAL at 09:02

## 2024-02-29 RX ADMIN — METOPROLOL TARTRATE 12.5 MG: 25 TABLET, FILM COATED ORAL at 09:02

## 2024-02-29 RX ADMIN — PANTOPRAZOLE SODIUM 40 MG: 40 TABLET, DELAYED RELEASE ORAL at 04:02

## 2024-02-29 RX ADMIN — DRONABINOL 5 MG: 2.5 CAPSULE ORAL at 04:02

## 2024-02-29 RX ADMIN — GUAIFENESIN AND DEXTROMETHORPHAN HYDROBROMIDE 1 TABLET: 600; 30 TABLET, EXTENDED RELEASE ORAL at 08:02

## 2024-02-29 RX ADMIN — VALSARTAN 20 MG: 40 TABLET, FILM COATED ORAL at 08:02

## 2024-02-29 RX ADMIN — ATORVASTATIN CALCIUM 40 MG: 40 TABLET, FILM COATED ORAL at 08:02

## 2024-02-29 RX ADMIN — DOXYCYCLINE HYCLATE 100 MG: 100 TABLET, COATED ORAL at 08:02

## 2024-02-29 RX ADMIN — AMIODARONE HYDROCHLORIDE 200 MG: 200 TABLET ORAL at 09:02

## 2024-02-29 RX ADMIN — DRONABINOL 5 MG: 2.5 CAPSULE ORAL at 06:02

## 2024-02-29 RX ADMIN — GUAIFENESIN AND DEXTROMETHORPHAN HYDROBROMIDE 1 TABLET: 600; 30 TABLET, EXTENDED RELEASE ORAL at 09:02

## 2024-02-29 RX ADMIN — PANTOPRAZOLE SODIUM 40 MG: 40 TABLET, DELAYED RELEASE ORAL at 06:02

## 2024-02-29 RX ADMIN — Medication 324 MG: at 09:02

## 2024-02-29 RX ADMIN — METOPROLOL TARTRATE 12.5 MG: 25 TABLET, FILM COATED ORAL at 08:02

## 2024-02-29 NOTE — ASSESSMENT & PLAN NOTE
New, explains shortness of breath and cough even as pulmonary edema and pleural effusion were treated. Start doxycycline. Watch for a day. Discharge tomorrow if no issues.

## 2024-02-29 NOTE — PLAN OF CARE
Problem: Device-Related Complication Risk (Hemodialysis)  Goal: Safe, Effective Therapy Delivery  Outcome: Ongoing, Progressing     Problem: Hemodynamic Instability (Hemodialysis)  Goal: Effective Tissue Perfusion  Outcome: Ongoing, Progressing     Problem: Infection (Hemodialysis)  Goal: Absence of Infection Signs and Symptoms  Outcome: Ongoing, Progressing     Problem: Adult Inpatient Plan of Care  Goal: Plan of Care Review  Outcome: Ongoing, Progressing  Goal: Patient-Specific Goal (Individualized)  Outcome: Ongoing, Progressing  Goal: Absence of Hospital-Acquired Illness or Injury  Outcome: Ongoing, Progressing  Goal: Optimal Comfort and Wellbeing  Outcome: Ongoing, Progressing  Goal: Readiness for Transition of Care  Outcome: Ongoing, Progressing     Problem: Device-Related Complication Risk (CRRT (Continuous Renal Replacement Therapy))  Goal: Safe, Effective Therapy Delivery  Outcome: Ongoing, Progressing     Problem: Hypothermia (CRRT (Continuous Renal Replacement Therapy))  Goal: Body Temperature Maintained in Desired Range  Outcome: Ongoing, Progressing     Problem: Infection (CRRT (Continuous Renal Replacement Therapy))  Goal: Absence of Infection Signs and Symptoms  Outcome: Ongoing, Progressing     Problem: Diabetes Comorbidity  Goal: Blood Glucose Level Within Targeted Range  Outcome: Ongoing, Progressing     Problem: Fluid Imbalance (Pneumonia)  Goal: Fluid Balance  Outcome: Ongoing, Progressing     Problem: Infection (Pneumonia)  Goal: Resolution of Infection Signs and Symptoms  Outcome: Ongoing, Progressing     Problem: Respiratory Compromise (Pneumonia)  Goal: Effective Oxygenation and Ventilation  Outcome: Ongoing, Progressing     Problem: Infection  Goal: Absence of Infection Signs and Symptoms  Outcome: Ongoing, Progressing     Problem: Impaired Wound Healing  Goal: Optimal Wound Healing  Outcome: Ongoing, Progressing     Problem: Skin Injury Risk Increased  Goal: Skin Health and  Integrity  Outcome: Ongoing, Progressing     Problem: Coping Ineffective  Goal: Effective Coping  Outcome: Ongoing, Progressing  Pt currently sitting up in her chair in her room resp even and unlabored at this time pt has been wean off her oxygen and is currently on room air with no sob noted she had a thoracentesis done yesterday and breathing seems to have improved pt continues to have her blood sugar monitored and given prn insulin per sliding scale.

## 2024-02-29 NOTE — PLAN OF CARE
Problem: Device-Related Complication Risk (Hemodialysis)  Goal: Safe, Effective Therapy Delivery  Outcome: Ongoing, Progressing     Problem: Hemodynamic Instability (Hemodialysis)  Goal: Effective Tissue Perfusion  Outcome: Ongoing, Progressing     Problem: Infection (Hemodialysis)  Goal: Absence of Infection Signs and Symptoms  Outcome: Ongoing, Progressing     Problem: Adult Inpatient Plan of Care  Goal: Plan of Care Review  Outcome: Ongoing, Progressing  Goal: Patient-Specific Goal (Individualized)  Outcome: Ongoing, Progressing  Goal: Absence of Hospital-Acquired Illness or Injury  Outcome: Ongoing, Progressing  Goal: Optimal Comfort and Wellbeing  Outcome: Ongoing, Progressing  Goal: Readiness for Transition of Care  Outcome: Ongoing, Progressing     Problem: Device-Related Complication Risk (CRRT (Continuous Renal Replacement Therapy))  Goal: Safe, Effective Therapy Delivery  Outcome: Ongoing, Progressing     Problem: Infection (CRRT (Continuous Renal Replacement Therapy))  Goal: Absence of Infection Signs and Symptoms  Outcome: Ongoing, Progressing     Problem: Diabetes Comorbidity  Goal: Blood Glucose Level Within Targeted Range  Outcome: Ongoing, Progressing

## 2024-02-29 NOTE — PT/OT/SLP PROGRESS
Physical Therapy Treatment    Patient Name:  Lily Green   MRN:  [id number]    Recommendations:     Discharge Recommendations: Moderate Intensity Therapy  Discharge Equipment Recommendations: wheelchair  Barriers to discharge: Pt requiring increased skilled assistance at current time.     Assessment:     Lily Green is a 73 y.o. female admitted with a medical diagnosis of Acute hypoxemic respiratory failure.  She presents with the following impairments/functional limitations: weakness, impaired endurance, impaired self care skills, impaired functional mobility, gait instability, impaired balance, decreased coordination, decreased upper extremity function, decreased lower extremity function, decreased safety awareness, impaired coordination, impaired cardiopulmonary response to activity requiring mod assistance and verbal cues for sit < > stand transitions, OOB/BTB transfers; min A for gait to prevent falls due to weakness, instability, fatigue, SOB, and fear of falling.   In light of pt's current functional level and deficits, it is anticipated that pt will need to participate in a moderate intensity rehab program consisting of PT and OT in order to achieve full rehab potential to return to previous level of function and roles.  Pt remains motivated to participate in PT session and will cont to benefit from skilled PT intervention..    Rehab Prognosis: Good; patient would benefit from acute skilled PT services to address these deficits and reach maximum level of function.    Recent Surgery: Procedure(s) (LRB):  ANGIOGRAM, CORONARY ARTERY (N/A)  Left heart cath (Left) 8 Days Post-Op    Plan:     During this hospitalization, patient to be seen 3 x/week to address the identified rehab impairments via gait training, therapeutic activities, therapeutic exercises, neuromuscular re-education and progress toward the following goals:    Plan of Care Expires:  03/16/24    Subjective     Chief Complaint:  weakness, fatigue, fear of falling  Pain/Comfort:  Pain Rating 1: 0/10  Pain Rating Post-Intervention 1: 0/10      Objective:     Communicated with nurse (Andie) prior to session.  Patient found up in chair with telemetry ( present) upon PT entry to room.     General Precautions: Standard, fall  Orthopedic Precautions: N/A  Braces: N/A  Respiratory Status: Room air     Functional Mobility:  Transfers:     Sit to Stand:  from BS chair with mod A of 1 helper for hip elevation and balance x3 trials; from EOB with min A of 1 helper for hip elevation with rolling walker  Stand to sit at the EOB/BS chair with CGA and vc's for hand placement  Gait: RW min A for balance and mgt of AD 4ft and then 6ft  (seated rest break in between trials) with vc's for directional guidance and safety.   Balance: static standing with RW CGA 20 sec      AM-PAC 6 CLICK MOBILITY  Turning over in bed (including adjusting bedclothes, sheets and blankets)?: 3  Sitting down on and standing up from a chair with arms (e.g., wheelchair, bedside commode, etc.): 2  Moving from lying on back to sitting on the side of the bed?: 2  Moving to and from a bed to a chair (including a wheelchair)?: 2  Need to walk in hospital room?: 2  Climbing 3-5 steps with a railing?: 1  Basic Mobility Total Score: 12       Treatment & Education:  Patient provided with daily orientation and goals of this PT session. They were educated to call for assistance and to transfer with hospital staff only.  Also, pt was educated on the effects of prolonged immobility and the importance of performing OOB activity and exercises to promote healing and reduce recovery time    Patient left up in chair with all lines intact, call button in reach, nurse notified, and  present..    GOALS:   Multidisciplinary Problems       Physical Therapy Goals          Problem: Physical Therapy    Goal Priority Disciplines Outcome Goal Variances Interventions   Physical Therapy Goal      PT, PT/OT Ongoing, Progressing     Description: Goals to be met by: 3/16/24     Patient will increase functional independence with mobility by performin. Supine to sit with Contact Guard Assistance  2. Sit to supine with Contact Guard Assistance  3. Sit to stand transfer with Contact Guard Assistance  4. Bed to chair transfer with Contact Guard Assistance using Rolling Walker  5. Gait  x 50 feet with Contact Guard Assistance using Rolling Walker.                          Time Tracking:     PT Received On: 24  PT Start Time: 1401     PT Stop Time: 1432  PT Total Time (min): 31 min     Billable Minutes: Gait Training 10 and Therapeutic Activity 21    Treatment Type: Treatment  PT/PTA: PTA     Number of PTA visits since last PT visit: 0     2024

## 2024-02-29 NOTE — PLAN OF CARE
Problem: Device-Related Complication Risk (Hemodialysis)  Goal: Safe, Effective Therapy Delivery  2/28/2024 1800 by Andie Isaac RN  Outcome: Ongoing, Progressing  2/28/2024 1755 by Andie Isaac RN  Outcome: Ongoing, Progressing  2/28/2024 1755 by Andie Isaac RN  Outcome: Ongoing, Progressing  2/28/2024 1754 by Andie Isaac RN  Outcome: Ongoing, Progressing  2/28/2024 1731 by Andie Isaac RN  Outcome: Ongoing, Progressing     Problem: Hemodynamic Instability (Hemodialysis)  Goal: Effective Tissue Perfusion  2/28/2024 1800 by Andie Isaac RN  Outcome: Ongoing, Progressing  2/28/2024 1755 by Andie Isaac RN  Outcome: Ongoing, Progressing  2/28/2024 1755 by Andie Isaac RN  Outcome: Ongoing, Progressing  2/28/2024 1754 by Andie Isaac RN  Outcome: Ongoing, Progressing  2/28/2024 1731 by Andie Isaac RN  Outcome: Ongoing, Progressing     Problem: Infection (Hemodialysis)  Goal: Absence of Infection Signs and Symptoms  2/28/2024 1800 by Andie Isaac RN  Outcome: Ongoing, Progressing  2/28/2024 1755 by Andie Isaac RN  Outcome: Ongoing, Progressing  2/28/2024 1755 by Andie Isaac RN  Outcome: Ongoing, Progressing  2/28/2024 1754 by Andie Isaac RN  Outcome: Ongoing, Progressing  2/28/2024 1731 by Andie Isaac RN  Outcome: Ongoing, Progressing     Problem: Adult Inpatient Plan of Care  Goal: Plan of Care Review  2/28/2024 1800 by Andie Isaac RN  Outcome: Ongoing, Progressing  2/28/2024 1755 by Andie Isaac RN  Outcome: Ongoing, Progressing  2/28/2024 1755 by Andie Isaac RN  Outcome: Ongoing, Progressing  2/28/2024 1754 by Andie Isaac RN  Outcome: Ongoing, Progressing  2/28/2024 1731 by Poncho, Janaytt, RN  Outcome: Ongoing, Progressing  Goal: Patient-Specific Goal (Individualized)  2/28/2024 1800 by Andie Isaac, RN  Outcome: Ongoing, Progressing  2/28/2024 1755 by Andie Isaac, RN  Outcome: Ongoing, Progressing  2/28/2024 1755 by  Andie Isaac RN  Outcome: Ongoing, Progressing  2/28/2024 1754 by Andie Isaac RN  Outcome: Ongoing, Progressing  2/28/2024 1731 by Andie Isaac RN  Outcome: Ongoing, Progressing  Goal: Absence of Hospital-Acquired Illness or Injury  2/28/2024 1800 by Andie Isaac RN  Outcome: Ongoing, Progressing  2/28/2024 1755 by Andie Isaac RN  Outcome: Ongoing, Progressing  2/28/2024 1755 by Andie Isaac RN  Outcome: Ongoing, Progressing  2/28/2024 1754 by Andie Isaac RN  Outcome: Ongoing, Progressing  2/28/2024 1731 by Andie Isaac RN  Outcome: Ongoing, Progressing  Goal: Optimal Comfort and Wellbeing  2/28/2024 1800 by Andie Isaac RN  Outcome: Ongoing, Progressing  2/28/2024 1755 by Andie Isaac RN  Outcome: Ongoing, Progressing  2/28/2024 1755 by Andie Isaac RN  Outcome: Ongoing, Progressing  2/28/2024 1754 by Andie Isaac RN  Outcome: Ongoing, Progressing  2/28/2024 1731 by Andie Isaac RN  Outcome: Ongoing, Progressing  Goal: Readiness for Transition of Care  2/28/2024 1800 by Andie Isaac RN  Outcome: Ongoing, Progressing  2/28/2024 1755 by Andie Isaac RN  Outcome: Ongoing, Progressing  2/28/2024 1755 by Andie Isaac RN  Outcome: Ongoing, Progressing  2/28/2024 1754 by Andie Isaac RN  Outcome: Ongoing, Progressing  2/28/2024 1731 by Andie Isaac RN  Outcome: Ongoing, Progressing     Problem: Device-Related Complication Risk (CRRT (Continuous Renal Replacement Therapy))  Goal: Safe, Effective Therapy Delivery  2/28/2024 1800 by Andie Isaac RN  Outcome: Ongoing, Progressing  2/28/2024 1755 by Andie Isaac RN  Outcome: Ongoing, Progressing  2/28/2024 1755 by Andie Isaac, ARABELLA  Outcome: Ongoing, Progressing  2/28/2024 1754 by Andie Isaac RN  Outcome: Ongoing, Progressing  2/28/2024 1731 by Andie Isaac RN  Outcome: Ongoing, Progressing     Problem: Hypothermia (CRRT (Continuous Renal Replacement Therapy))  Goal: Body Temperature  Maintained in Desired Range  2/28/2024 1800 by Andie Isaac RN  Outcome: Ongoing, Progressing  2/28/2024 1755 by Andie Isaac RN  Outcome: Ongoing, Progressing  2/28/2024 1755 by Andie Isaac RN  Outcome: Ongoing, Progressing  2/28/2024 1754 by Andie Isaac RN  Outcome: Ongoing, Progressing  2/28/2024 1731 by Andie Isaac RN  Outcome: Ongoing, Progressing     Problem: Infection (CRRT (Continuous Renal Replacement Therapy))  Goal: Absence of Infection Signs and Symptoms  2/28/2024 1800 by Andie Isaac RN  Outcome: Ongoing, Progressing  2/28/2024 1755 by Andei Isaac RN  Outcome: Ongoing, Progressing  2/28/2024 1755 by Andie Isaac RN  Outcome: Ongoing, Progressing  2/28/2024 1754 by Andie Isaac RN  Outcome: Ongoing, Progressing  2/28/2024 1731 by Andie Isaac RN  Outcome: Ongoing, Progressing     Problem: Diabetes Comorbidity  Goal: Blood Glucose Level Within Targeted Range  2/28/2024 1800 by Andie Isaac RN  Outcome: Ongoing, Progressing  2/28/2024 1755 by Andie Isaac RN  Outcome: Ongoing, Progressing  2/28/2024 1755 by Andie Isaac RN  Outcome: Ongoing, Progressing  2/28/2024 1754 by Andie Isaac RN  Outcome: Ongoing, Progressing  2/28/2024 1731 by Andie Isaac RN  Outcome: Ongoing, Progressing     Problem: Fluid Imbalance (Pneumonia)  Goal: Fluid Balance  2/28/2024 1800 by Andie Isaac RN  Outcome: Ongoing, Progressing  2/28/2024 1755 by Andie Isaac RN  Outcome: Ongoing, Progressing  2/28/2024 1755 by Andie Isaac RN  Outcome: Ongoing, Progressing  2/28/2024 1754 by Andie Isaac RN  Outcome: Ongoing, Progressing  2/28/2024 1731 by Andie Isaac RN  Outcome: Ongoing, Progressing     Problem: Infection (Pneumonia)  Goal: Resolution of Infection Signs and Symptoms  2/28/2024 1800 by Andie Isaac RN  Outcome: Ongoing, Progressing  2/28/2024 1755 by Andie Isaac RN  Outcome: Ongoing, Progressing  2/28/2024 1755 by Andie Isaac,  RN  Outcome: Ongoing, Progressing  2/28/2024 1754 by Andie Isaac RN  Outcome: Ongoing, Progressing  2/28/2024 1731 by Andie Isaac RN  Outcome: Ongoing, Progressing     Problem: Respiratory Compromise (Pneumonia)  Goal: Effective Oxygenation and Ventilation  2/28/2024 1800 by Andie Isaac RN  Outcome: Ongoing, Progressing  2/28/2024 1755 by Andie Isaac RN  Outcome: Ongoing, Progressing  2/28/2024 1755 by Andie Isaac RN  Outcome: Ongoing, Progressing  2/28/2024 1754 by Andie Isaac RN  Outcome: Ongoing, Progressing  2/28/2024 1731 by Andie Isaac RN  Outcome: Ongoing, Progressing     Problem: Infection  Goal: Absence of Infection Signs and Symptoms  2/28/2024 1800 by Andie Isaac RN  Outcome: Ongoing, Progressing  2/28/2024 1755 by Andie Isaac RN  Outcome: Ongoing, Progressing  2/28/2024 1755 by Andie Isaac RN  Outcome: Ongoing, Progressing  2/28/2024 1754 by Andie Isaac RN  Outcome: Ongoing, Progressing  2/28/2024 1731 by Andie Isaac RN  Outcome: Ongoing, Progressing     Problem: Impaired Wound Healing  Goal: Optimal Wound Healing  2/28/2024 1800 by Andie Isaac RN  Outcome: Ongoing, Progressing  2/28/2024 1755 by Andie Isaac RN  Outcome: Ongoing, Progressing  2/28/2024 1755 by Andie Isaac RN  Outcome: Ongoing, Progressing  2/28/2024 1754 by Andie Isaac RN  Outcome: Ongoing, Progressing  2/28/2024 1731 by Andie Isaac RN  Outcome: Ongoing, Progressing     Problem: Skin Injury Risk Increased  Goal: Skin Health and Integrity  2/28/2024 1800 by Andie Isaac RN  Outcome: Ongoing, Progressing  2/28/2024 1755 by Andie Isaac RN  Outcome: Ongoing, Progressing  2/28/2024 1755 by Andie Isaac RN  Outcome: Ongoing, Progressing  2/28/2024 1754 by Andie Isaac RN  Outcome: Ongoing, Progressing  2/28/2024 1731 by Andie Isaac RN  Outcome: Ongoing, Progressing     Problem: Coping Ineffective  Goal: Effective Coping  2/28/2024 1800 by  Andie Isaac, ARABELLA  Outcome: Ongoing, Progressing  2/28/2024 1755 by Andie Isaac, ARABELLA  Outcome: Ongoing, Progressing  2/28/2024 1755 by Andie Isaac, ARABELLA  Outcome: Ongoing, Progressing  2/28/2024 1754 by Andie Isaac, ARABELLA  Outcome: Ongoing, Progressing  2/28/2024 1731 by Andie Isaac, ARABELLA  Outcome: Ongoing, Progressing   Pt. Currently laying in bed resting quietly resp even and unlabored no distress noted at this time. She continues to have anxiety prn anxiety meds given safety precautions maintained.

## 2024-02-29 NOTE — PLAN OF CARE
Per Gerri in admissions at [facility], they have received insurance SNF auth.    [facility] - Telemetry Stepdown  Discharge Reassessment    Primary Care Provider: Pavel Calixto MD    Expected Discharge Date: 3/1/2024    Reassessment (most recent)       Discharge Reassessment - 02/29/24 1439          Discharge Reassessment    Assessment Type Discharge Planning Reassessment     Did the patient's condition or plan change since previous assessment? Yes     Discharge Plan A Skilled Nursing Facility     Discharge Plan B Home with family;Home Health     DME Needed Upon Discharge  none     Transition of Care Barriers None     Why the patient remains in the hospital Requires continued medical care   Per MD, will need to start new abx       Post-Acute Status    Post-Acute Authorization Placement     Post-Acute Placement Status Pending medical clearance/testing   [facility] SNF- has auth; notified of [facility] 3/1/24    Discharge Delays None known at this time                 Discharge Plan A and Plan B have been determined by review of patient's clinical status, future medical and therapeutic needs, and coverage/benefits for post-acute care in coordination with multidisciplinary team members.    Lulu Chan, [facility]  [facility]  Ext. [phone]

## 2024-02-29 NOTE — PROGRESS NOTES
[facility] - Telemetry [facility] Medicine  Progress Note    Patient Name: Lily Green  MRN: [id number]  Patient Class: IP- Inpatient   Admission Date: 2/12/2024  Length of Stay: 17 days  Attending Physician: Patrick Guardado MD  Primary Care Provider: Pavel Calixto MD        Subjective:     Principal Problem:Acute hypoxemic respiratory failure        HPI:  Lily Green is a 73 year old white woman with celiac disease, diabetes mellitus type 2 with polyneuropathy, hypertension, hyperlipidemia, history of peptic ulcer disease, depression, anxiety, history of hysterectomy in 1987, history of peritonitis due to diffuse colonic ischemia status post total colectomy and end ileostomy creation on 1/14/2023, evacuation of intraabdominal hematoma, takedown of left lateral ileostomy and recreation of right lateral end ileostomy on 1/16/2023, elective robotic ileostomy reversal and ileocolonic anastomosis on 8/7/2023, irritable bowel syndrome, end stage renal disease status post right internal jugular permanent dialysis catheter on 1/25/2023 removed on 1/28/2023 and replaced with left internal jugular permanent dialysis catheter (on hemodialysis Tuesday Thursday Saturday), right middle lobe carcinoid tumor status post robotic-assisted converted to right thoracotomy with therapeutic wedge resection and mediastinal lymph node dissection and repair of bleeding right inferior pulmonary vein on 1/2/2024, atrial fibrillation, Medtronic Micra AV leadless cardiac pacemaker placement on 1/16/2024. She lives in [place], [place]. She is . Her primary care physician is Dr. Pavel Calixto.               She was hospitalized at [facility] from 1/2/2024 to 2/6/2024 for right middle lobe carcinoid tumor, parapneumonic effusion, atrial fibrillation with rapid ventricular response, tachy-eulogio syndrome, gastrointestinal bleeding while on heparin. She was discharged to [facility] nursing [facility]. She was prescribed cefpodoxime and metronidazole for 13 days, fluconazole for 7 days, metoprolol tartrate, and venlafaxine.               She presented to [facility] Emergency Department on 2/12/2024 from the [facility] nursing [facility] with increased work of breathing, dyspnea, and hypoxia with oxygen saturation of 70% on 2 liters/minute of supplemental oxygen. It occurred acutely on the day of presentation. Emergency medical services put her on CPAP with some improvement. She was put on BiPAP in the emergency department. She had tachycardia and tachypnea. Her last dialysis session was 2 days prior on Saturday. She reported cough and nausea. Labs showed BNP 2754 pg/mL, troponin 0.312 ng/mL. Venous blood gas showed pH 7.438, pCO2 38.9. EKG showed sinus tachycardia. Chest X-ray showed pleural effusions and possible multifocal pneumonia. Nephrology was consulted for emergent dialysis with goal of removing 3 liters. She was admitted to Critical Care Medicine.    Overview/Hospital Course:  Echocardiogram revealed new low left ventricular ejection fraction. Troponins remained elevated. Cardiology was consulted. She developed atrial fibrillation with rapid ventricular response on 2/13/2024. She was started on amiodarone infusion. She underwent slow low-efficiency dialysis (SLED). CT showed fluid collections in the right paraesophageal space and azygoesophageal recess. Cardiothoracic Surgery was consulted and recommended Interventional Radiology consult for drainage, but there was no safe window for approach. Interventional Cardiology deferred angiography due to respiratory failure. Palliative Care was consulted. Dialysis removed further fluid. Metoprolol was increased. Thoracentesis was performed on 2/15/2024 removing 1.45 liters, which was sent for analysis. She developed re-expansion pulmonary edema and was started on Airvo high flow oxygen. Airvo was weaned  down to 50 liters/minute and FiO2 40% on 2/16/2024. Pleural effusion was transudative. She was started on vasopressor support. She was weaned to high flow nasal cannula at 12 liters/minute on 2/17/2024. She tolerated SLED without norepinephrine. She was weaned to 4 liters/minute on 2/18/2024. She was transferred to [facility] Medicine Team W on 2/20/2024. She was started on nifedipine. She worked with Physical and Occupational Therapy. Interventional Cardiology was reconsulted. Left heart catheterization showed luminal irregularities. Interventional Cardiology recommended starting aspirin 81 mg daily and atorvastatin 10 mg daily and consulting Heart Transplant Service (HTS) for management of nonischemic cardiomyopathy. She was already on atorvastatin 40 mg daily. Palliative Care signed off and planned to see her outpatient. She continued to require 4 liters/minute of supplemental oxygen. She was still short of breath. HTS was consulted and they said General Cardiology could be consulted. Cardiology recommended stopping nifedipine, starting valsartan, and resuming her metoprolol. Nasal cannula was weaned to 2 liters/minute on 2/25/2024 but she started coughing. Repeat chest X-ray on 2/26/2024 paradoxically showed slight increase in left pleural effusion, as well as persistent mild pulmonary vascular congestion. Interventional Radiology was consulted for thoracentesis. They removed 725 mL of pleural fluid on 2/27/2024. Fluid was sent for cultures. She continued to feel short of breath with tachypnea on 2/28/2024. Net fluid status since admission was negative 13.8 liters at this point. Hypoxia resolved but she was coughing more. Chest CTA showed a new left lower lobe pneumonia.     Interval History: Weaned off nasal cannula. Able to stand with a walker and therapist's help.    Review of Systems   Constitutional:  Negative for chills and fever.   Respiratory:  Positive for cough.    Gastrointestinal:  Negative for  abdominal pain and blood in stool.   Neurological:  Negative for syncope and headaches.     Objective:     Vital Signs (Most Recent):  Temp: 97 °F (36.1 °C) (02/28/24 0845)  Pulse: 82 (02/28/24 1254)  Resp: 17 (02/28/24 0845)  BP: 121/64 (02/28/24 1254)  SpO2: 100 % (02/28/24 0845) Vital Signs (24h Range):  Temp:  [97 °F (36.1 °C)-98.7 °F (37.1 °C)] 97 °F (36.1 °C)  Pulse:  [] 82  Resp:  [16-18] 17  SpO2:  [92 %-100 %] 100 %  BP: ()/(48-86) 121/64     Weight: 67.6 kg (149 lb 0.5 oz)  Body mass index is 24.8 kg/m².    Intake/Output Summary (Last 24 hours) at 2/28/2024 1314  Last data filed at 2/27/2024 1630  Gross per 24 hour   Intake 240 ml   Output 725 ml   Net -485 ml           Physical Exam  Vitals and nursing note reviewed.   Constitutional:       General: She is not in acute distress.     Appearance: She is not diaphoretic.      Interventions: She is not intubated.Nasal cannula in place.   Pulmonary:      Effort: Tachypnea present. No respiratory distress. She is not intubated.      Breath sounds: No stridor. No wheezing.   Musculoskeletal:         General: No swelling or tenderness.      Right lower leg: No edema.      Left lower leg: No edema.   Skin:     General: Skin is warm and dry.      Coloration: Skin is not jaundiced.   Neurological:      Mental Status: She is alert and oriented to person, place, and time. Mental status is at baseline.      Motor: No seizure activity.   Psychiatric:         Attention and Perception: Attention normal.         Behavior: Behavior normal. Behavior is not aggressive or combative.             Significant Labs: All pertinent labs within the past 24 hours have been reviewed.    Significant Imaging: I have reviewed all pertinent imaging results/findings within the past 24 hours.  CTA Chest Non-Coronary (PE Studies) 2/29/24: FINDINGS:   Diagnostic quality: Diagnostic.  Some limitations due to respiratory motion artifact in the lung bases.   Pulmonary arteries: The  pulmonary arteries are well visualized through the segmental level. No pulmonary embolism is identified.  Subsegmental pulmonary arteries are not well visualized due to motion artifact.   Right heart strain: None.   Lungs and pleura: Interval improvement though persistent right small loculated appearing pleural effusion extending from the apex to lung base left fissural fluid.  Near complete resolution previously seen left large loculated effusion.  Left lower lobe large consolidation concerning for pneumonia.  Additional small bilateral hazy airspace opacities.  Bilateral bronchial wall thickening.  Right lower lobe 7 mm solid nodule (series 2, image 302).  Additional right lower lobe 5 mm nodule (series 2, image 331)..   Mediastinum and barbie: No mediastinal or hilar adenopathy.   Vessels: No atherosclerotic changes in the aorta and coronary arteries.   Heart and pericardium: Heart size is normal.  Small pericardial effusion.   Upper abdomen: Normal.   Bones: Stable degenerative changes.  No acute osseous findings..   Impression:  1. No evidence of pulmonary embolism is identified.  The subsegmental branches are not evaluable due to motion artifact.   2. New left lower lobe consolidation concerning for pneumonia.   3. Interval improvement though persistent right loculated small perfusion.  Near complete resolution of left pleural effusion.   4. Pulmonary nodules as described above.  For multiple <6 mm sub solid nodules, Fleischner Society 2017 guidelines recommend short term follow up non-contrast chest CT in 3-6 months, as these are typically benign in nature (due to etiologies such as infection). For multiple solid nodules with any 6 mm or greater, Fleischner Society guidelines recommend follow up with non-contrast chest CT at 3-6 months and 18-24 months after discovery.     Assessment/Plan:      Left lower lobe pneumonia  New, explains shortness of breath and cough even as pulmonary edema and pleural effusion  were treated. Start doxycycline. Watch for a day. Discharge tomorrow if no issues.      Moderate malnutrition  Nutrition consulted. Most recent weight and BMI monitored-     Measurements:  Wt Readings from Last 1 Encounters:   02/20/24 67.6 kg (149 lb 0.5 oz)   Body mass index is 24.8 kg/m².    Patient has been screened and assessed by RD.    Malnutrition Type:  Context: chronic illness  Level: moderate    Malnutrition Characteristic Summary:  Energy Intake (Malnutrition): less than 75% for greater than or equal to 1 month  Muscle Mass (Malnutrition): mild depletion  Fluid Accumulation (Malnutrition): severe    Interventions/Recommendations (treatment strategy):  1.Continue gluten free diet with texture per SLP 2. modify [facility] T[facility] 3. RD to monitor and follow       Atrial flutter  Started amiodarone. Anticoagulation contraindicated.      Acute decompensated heart failure  LHC done. Nonischemic cardiomyopathy. Appreciate Cardiology. Resumed home metoprolol. Started valsartan. Hypoxia has significantly improved.     Secondary hyperparathyroidism  Stable.      Pericardial effusion  Treating fluid overload      Chronic kidney disease-mineral and bone disorder  Continue home sevelamer.    Gastric ulcer  Prescribed PPI to take until 2/23/2024.    Pleural effusion on left  Much improved after thoracentesis. No culture growth.    Atrial fibrillation  Chronic. Home metoprolol changed to amiodarone.    ESRD (end stage renal disease)  Nephrology following.    Anemia in ESRD (end-stage renal disease)  Patient's anemia is currently controlled. Has not received any PRBCs to date. Etiology likely d/t chronic disease due to Chronic Kidney Disease/ESRD  Current CBC reviewed-   Lab Results   Component Value Date    HGB 7.9 (L) 02/21/2024    HCT 25.7 (L) 02/21/2024     Monitor serial CBC and transfuse if patient becomes hemodynamically unstable, symptomatic or H/H drops below 7/21.    Generalized anxiety  disorder  Continue home venlafaxine       Hyperlipidemia, mixed  Continue home atorvastatin.      Type 2 diabetes mellitus with diabetic polyneuropathy, without long-term current use of insulin  Patient's FSGs are controlled on current medication regimen.  Last A1c reviewed-   Lab Results   Component Value Date    HGBA1C 4.9 11/15/2023     Most recent fingerstick glucose reviewed-   Recent Labs   Lab 02/20/24  1713 02/20/24  2245 02/21/24  0937   POCTGLUCOSE 214* 220* 189*       Current correctional scale  Low  Maintain anti-hyperglycemic dose as follows-   Antihyperglycemics (From admission, onward)      Start     Stop Route Frequency Ordered    02/15/24 1054  insulin aspart U-100 pen 0-10 Units         -- SubQ Before meals & nightly PRN 02/15/24 1055          Hold Oral hypoglycemics while patient is in the hospital.      VTE Risk Mitigation (From admission, onward)           Ordered     heparin (porcine) injection 1,000 Units  As needed (PRN)         02/23/24 1521     IP VTE HIGH RISK PATIENT  Once         02/12/24 0416     Place sequential compression device  Until discontinued         02/12/24 0416     heparin (porcine) injection 1,000 Units  As needed (PRN)         02/12/24 0229                    Discharge Planning   CAMPBELL: 3/1/2024     Code Status: Full Code   Is the patient medically ready for discharge?: No    Reason for patient still in hospital (select all that apply): Patient new problem  Discharge Plan A: Skilled Nursing Facility   Discharge Delays: None known at this time              Patrick Guardado MD  Department of Hospital Medicine   [facility] - Telemetry Stepdown

## 2024-02-29 NOTE — PT/OT/SLP PROGRESS
Occupational Therapy   Treatment    Name: Lily Green  MRN: [id number]  Admitting Diagnosis:  Acute hypoxemic respiratory failure  8 Days Post-Op    Recommendations:     Discharge Recommendations: Moderate Intensity Therapy  Discharge Equipment Recommendations:  wheelchair  Barriers to discharge:   (increased (A) required)    Assessment:     Lily Green is a 73 y.o. female with a medical diagnosis of Acute hypoxemic respiratory failure.  She presents with the following performance deficits affecting function are weakness, impaired balance, decreased safety awareness, impaired endurance, impaired cardiopulmonary response to activity, gait instability, impaired functional mobility, decreased lower extremity function, impaired self care skills. Pt demonstrated increased breathing during donning socks on ea foot while seated, but was able to complete tasks with seated rest breaks. In addition, she demonstrates increased breathing during static standing with no AD and HHA; however, requiring verbal cueing for pursed lip breathing technique due to pt demonstrating mouth breathing.    Rehab Prognosis:  Good; patient would benefit from acute skilled OT services to address these deficits and reach maximum level of function.       Plan:     Patient to be seen 4 x/week to address the above listed problems via self-care/home management, therapeutic activities, therapeutic exercises, neuromuscular re-education  Plan of Care Expires: 02/29/24  Plan of Care Reviewed with: patient, spouse    Subjective     Chief Complaint: Feet feel heavy when standing, pt states  Patient/Family Comments/goals: Pt agreeable to tx session  Pain/Comfort:  Pain Rating 1: 0/10    Objective:     Communicated with: nurse (Andie) prior to session.  Patient found up in chair with telemetry, pulse ox (continuous) upon OT entry to room. Spouse present in room    General Precautions: Standard, fall    Orthopedic  Precautions:N/A  Braces: N/A  Respiratory Status: Room air     Occupational Performance:     Bed Mobility:    Not assessed 2/2 pt up in chair     Functional Mobility/Transfers:  Patient completed Sit <> Stand Transfer with moderate assistance  with  hand-held assist and no AD x 2 trials  1st trial: static standing with HHA 50 seconds  Seated rest break ~2-3 minutes taken between trials;   Vitals at  10:11AM seated in chair: R calf, Pulse: 76 bpm, SpO2 at 99% BP: 132/63  2nd trial: x 46 seconds static stand with HHA  Pt performed scooting towards edge and back into chair with SBA      Activities of Daily Living:  Feeding:  independence while performing self-feeding up in chair eating scrambled eggs  Lower Body Dressing: stand by assistance to don socks on ea foot  Rest break taken prior to donning sock on R foot after completion of donning on L   Total assistance to doff at end of session 2/2 lethargy  Upper Body dressing: Min A to tie back of gown      AMPAC 6 Click ADL: 18    Treatment & Education:  Pt performed bed mobility, functional mobility/transfers, and ADLs as documented above.   Pt educated and instructed on the following:  OT POC  Role of PETERSON  Use of call bell for all assistance  Pursed lip breathing technique 2/2 pt demonstrates open mouth breathing technique during increased breathing  Addressed questions and /or concerns within PETERSON scope of practice  Pt verbalized understanding     Patient left up in chair with all lines intact, call button in reach, RN (Andie) notified, and spouse present.  Notified RN (Andie) that BP read 199/109 on machine, 78 MAP at end of session although pt appeared in NAD and not increased breathing while seated in chair at end of session.    GOALS:   Multidisciplinary Problems       Occupational Therapy Goals          Problem: Occupational Therapy    Goal Priority Disciplines Outcome Interventions   Occupational Therapy Goal     OT, PT/OT Ongoing, Progressing     Description: Goals to be met by: 03-04-24     Patient will increase functional independence with ADLs by performing:    Feeding with Supervision.  UE Dressing with Minimal Assistance.   LE Dressing with Moderate Assistance. Met 2/29/24  Grooming while seated with Supervision. MET 2/27/24  Toileting from bedside commode with Moderate Assistance for hygiene and clothing management.   Supine to sit with Stand-by Assistance.  Stand pivot transfers with Contact Guard Assistance.  Step transfer with Contact Guard Assistance  Toilet transfer to bedside commode with Contact Guard Assistance.  Pt. To be Independent with hEP to BUE to improve level of endurance                         Time Tracking:     OT Date of Treatment: 02/29/24  OT Start Time: 0937  OT Stop Time: 1023  OT Total Time (min): 46 min    Billable Minutes:Self Care/Home Management 10  Therapeutic Activity 36    OT/RICHARD: RICHARD     Number of RICHARD visits since last OT visit: 2 2/29/2024

## 2024-03-01 VITALS
OXYGEN SATURATION: 96 % | HEIGHT: 65 IN | HEART RATE: 101 BPM | BODY MASS INDEX: 24.83 KG/M2 | TEMPERATURE: 98 F | DIASTOLIC BLOOD PRESSURE: 56 MMHG | RESPIRATION RATE: 18 BRPM | WEIGHT: 149.06 LBS | SYSTOLIC BLOOD PRESSURE: 159 MMHG

## 2024-03-01 LAB
ALBUMIN SERPL BCP-MCNC: 1.9 G/DL (ref 3.5–5.2)
ALP SERPL-CCNC: 108 U/L (ref 55–135)
ALT SERPL W/O P-5'-P-CCNC: 6 U/L (ref 10–44)
ANION GAP SERPL CALC-SCNC: 13 MMOL/L (ref 8–16)
AST SERPL-CCNC: 17 U/L (ref 10–40)
BACTERIA SPEC AEROBE CULT: NO GROWTH
BASOPHILS # BLD AUTO: 0.04 K/UL (ref 0–0.2)
BASOPHILS NFR BLD: 0.2 % (ref 0–1.9)
BILIRUB SERPL-MCNC: 0.3 MG/DL (ref 0.1–1)
BUN SERPL-MCNC: 72 MG/DL (ref 8–23)
CALCIUM SERPL-MCNC: 9.7 MG/DL (ref 8.7–10.5)
CHLORIDE SERPL-SCNC: 95 MMOL/L (ref 95–110)
CO2 SERPL-SCNC: 21 MMOL/L (ref 23–29)
CREAT SERPL-MCNC: 3.1 MG/DL (ref 0.5–1.4)
DIFFERENTIAL METHOD BLD: ABNORMAL
EOSINOPHIL # BLD AUTO: 0.1 K/UL (ref 0–0.5)
EOSINOPHIL NFR BLD: 0.4 % (ref 0–8)
ERYTHROCYTE [DISTWIDTH] IN BLOOD BY AUTOMATED COUNT: 21.2 % (ref 11.5–14.5)
EST. GFR  (NO RACE VARIABLE): 15.3 ML/MIN/1.73 M^2
GLUCOSE SERPL-MCNC: 166 MG/DL (ref 70–110)
HCT VFR BLD AUTO: 27.5 % (ref 37–48.5)
HGB BLD-MCNC: 8.4 G/DL (ref 12–16)
IMM GRANULOCYTES # BLD AUTO: 0.17 K/UL (ref 0–0.04)
IMM GRANULOCYTES NFR BLD AUTO: 0.9 % (ref 0–0.5)
LYMPHOCYTES # BLD AUTO: 0.7 K/UL (ref 1–4.8)
LYMPHOCYTES NFR BLD: 3.6 % (ref 18–48)
MAGNESIUM SERPL-MCNC: 2.1 MG/DL (ref 1.6–2.6)
MCH RBC QN AUTO: 29.8 PG (ref 27–31)
MCHC RBC AUTO-ENTMCNC: 30.5 G/DL (ref 32–36)
MCV RBC AUTO: 98 FL (ref 82–98)
MONOCYTES # BLD AUTO: 0.9 K/UL (ref 0.3–1)
MONOCYTES NFR BLD: 5 % (ref 4–15)
NEUTROPHILS # BLD AUTO: 16.3 K/UL (ref 1.8–7.7)
NEUTROPHILS NFR BLD: 89.9 % (ref 38–73)
NRBC BLD-RTO: 0 /100 WBC
PHOSPHATE SERPL-MCNC: 1.5 MG/DL (ref 2.7–4.5)
PLATELET # BLD AUTO: 319 K/UL (ref 150–450)
PMV BLD AUTO: 11.5 FL (ref 9.2–12.9)
POCT GLUCOSE: 155 MG/DL (ref 70–110)
POCT GLUCOSE: 159 MG/DL (ref 70–110)
POTASSIUM SERPL-SCNC: 4.1 MMOL/L (ref 3.5–5.1)
PROT SERPL-MCNC: 7 G/DL (ref 6–8.4)
RBC # BLD AUTO: 2.82 M/UL (ref 4–5.4)
SODIUM SERPL-SCNC: 129 MMOL/L (ref 136–145)
WBC # BLD AUTO: 18.17 K/UL (ref 3.9–12.7)

## 2024-03-01 PROCEDURE — 90935 HEMODIALYSIS ONE EVALUATION: CPT | Mod: ,,, | Performed by: NURSE PRACTITIONER

## 2024-03-01 PROCEDURE — 25000003 PHARM REV CODE 250: Performed by: INTERNAL MEDICINE

## 2024-03-01 PROCEDURE — 99900035 HC TECH TIME PER 15 MIN (STAT)

## 2024-03-01 PROCEDURE — 63600175 PHARM REV CODE 636 W HCPCS: Mod: JZ | Performed by: STUDENT IN AN ORGANIZED HEALTH CARE EDUCATION/TRAINING PROGRAM

## 2024-03-01 PROCEDURE — 63600175 PHARM REV CODE 636 W HCPCS: Performed by: NURSE PRACTITIONER

## 2024-03-01 PROCEDURE — 80053 COMPREHEN METABOLIC PANEL: CPT | Performed by: INTERNAL MEDICINE

## 2024-03-01 PROCEDURE — 90935 HEMODIALYSIS ONE EVALUATION: CPT

## 2024-03-01 PROCEDURE — 25000003 PHARM REV CODE 250

## 2024-03-01 PROCEDURE — 63600175 PHARM REV CODE 636 W HCPCS

## 2024-03-01 PROCEDURE — 80100016 HC MAINTENANCE HEMODIALYSIS

## 2024-03-01 PROCEDURE — 85025 COMPLETE CBC W/AUTO DIFF WBC: CPT | Performed by: INTERNAL MEDICINE

## 2024-03-01 PROCEDURE — 36415 COLL VENOUS BLD VENIPUNCTURE: CPT | Performed by: INTERNAL MEDICINE

## 2024-03-01 PROCEDURE — 25000003 PHARM REV CODE 250: Performed by: HOSPITALIST

## 2024-03-01 PROCEDURE — 84100 ASSAY OF PHOSPHORUS: CPT | Performed by: INTERNAL MEDICINE

## 2024-03-01 PROCEDURE — 83735 ASSAY OF MAGNESIUM: CPT | Performed by: INTERNAL MEDICINE

## 2024-03-01 RX ORDER — DOXYCYCLINE HYCLATE 100 MG
100 TABLET ORAL EVERY 12 HOURS
Qty: 12 TABLET | Refills: 0
Start: 2024-03-01 | End: 2024-03-07

## 2024-03-01 RX ORDER — CEFPODOXIME PROXETIL 200 MG/1
200 TABLET, FILM COATED ORAL DAILY
Qty: 6 TABLET | Refills: 0 | Status: SHIPPED | OUTPATIENT
Start: 2024-03-01 | End: 2024-03-07

## 2024-03-01 RX ORDER — CEFPODOXIME PROXETIL 200 MG/1
200 TABLET, FILM COATED ORAL DAILY
Status: DISCONTINUED | OUTPATIENT
Start: 2024-03-01 | End: 2024-03-01 | Stop reason: HOSPADM

## 2024-03-01 RX ADMIN — ERYTHROPOIETIN 3400 UNITS: 4000 INJECTION, SOLUTION INTRAVENOUS; SUBCUTANEOUS at 11:03

## 2024-03-01 RX ADMIN — VALSARTAN 20 MG: 40 TABLET, FILM COATED ORAL at 02:03

## 2024-03-01 RX ADMIN — DRONABINOL 5 MG: 2.5 CAPSULE ORAL at 06:03

## 2024-03-01 RX ADMIN — AMIODARONE HYDROCHLORIDE 200 MG: 200 TABLET ORAL at 02:03

## 2024-03-01 RX ADMIN — CEFPODOXIME PROXETIL 200 MG: 200 TABLET, FILM COATED ORAL at 02:03

## 2024-03-01 RX ADMIN — GUAIFENESIN AND DEXTROMETHORPHAN HYDROBROMIDE 1 TABLET: 600; 30 TABLET, EXTENDED RELEASE ORAL at 02:03

## 2024-03-01 RX ADMIN — PANTOPRAZOLE SODIUM 40 MG: 40 TABLET, DELAYED RELEASE ORAL at 02:03

## 2024-03-01 RX ADMIN — DOXYCYCLINE HYCLATE 100 MG: 100 TABLET, COATED ORAL at 02:03

## 2024-03-01 RX ADMIN — HEPARIN SODIUM 1000 UNITS: 1000 INJECTION, SOLUTION INTRAVENOUS; SUBCUTANEOUS at 10:03

## 2024-03-01 RX ADMIN — METOPROLOL TARTRATE 12.5 MG: 25 TABLET, FILM COATED ORAL at 02:03

## 2024-03-01 RX ADMIN — VENLAFAXINE 37.5 MG: 37.5 TABLET ORAL at 02:03

## 2024-03-01 RX ADMIN — Medication 324 MG: at 02:03

## 2024-03-01 RX ADMIN — PANTOPRAZOLE SODIUM 40 MG: 40 TABLET, DELAYED RELEASE ORAL at 06:03

## 2024-03-01 NOTE — NURSING
Patient off floor to dialysis via stretcher, no 02, room air, see vital signs flowsheet. Telemetry= NSR, patient ambulated to stretcher x 2 person's assist. Telephone report given to receiving Dialysis nurse. Plans to dc to SNF today after dialysis.

## 2024-03-01 NOTE — PLAN OF CARE
Sent SNF orders to [facility] in careport.    9:52 AM  Per Mary in admissions at [facility], nurse can call report to [address] [place] [address] nurse at [phone] and patient will go to room 414B. SW will schedule w/c van transport once patient returns from . Updated RN and MD.    Lulu Chan, GOODW  [facility] [address]  Ext. [phone]

## 2024-03-01 NOTE — PLAN OF CARE
Patient discharged TO [facility], left via wheelchair van. Belongings with patient, report called to SHAHIDA Crandall. IV and telemetry monitor removed, Dialysis cath noted to right subclavian dry and intact. Patient stable upon discharge, plan of care implemented.     Problem: Device-Related Complication Risk (Hemodialysis)  Goal: Safe, Effective Therapy Delivery  Outcome: Adequate for Care Transition     Problem: Hemodynamic Instability (Hemodialysis)  Goal: Effective Tissue Perfusion  Outcome: Adequate for Care Transition     Problem: Infection (Hemodialysis)  Goal: Absence of Infection Signs and Symptoms  Outcome: Adequate for Care Transition     Problem: Adult Inpatient Plan of Care  Goal: Readiness for Transition of Care  Outcome: Adequate for Care Transition     Problem: Diabetes Comorbidity  Goal: Blood Glucose Level Within Targeted Range  Outcome: Adequate for Care Transition     Problem: Fluid Imbalance (Pneumonia)  Goal: Fluid Balance  Outcome: Adequate for Care Transition

## 2024-03-01 NOTE — PROGRESS NOTES
Patient arrived in a stretcher to dialysis unit.   Report received from primary nurse   VS's per dialysis Flowsheet.     Hemodialysis initiated using the following:     Dialysis Access: RIJ, accessed using aseptic techinque     Will Maintain telemetry and blood pressure monitoring throughout treatment.  Refer to dialysis flowsheet and MAR for details.

## 2024-03-01 NOTE — PLAN OF CARE
2:05pm: SW scheduled pt transportation to [facility] at [address] [facility] [place] [address], [phone] through the [facility] for 3:30pm today 3/1/2024  w/wheelchair assistant. Pt nurse was notified through secure chart and SW contacted pt - Deric [phone] and provided update.     GRAYSON Steele   Case Management Dept-[facility]- [facility]  [phone]

## 2024-03-01 NOTE — PROGRESS NOTES
03/01/24 1045   Post-Hemodialysis Assessment   Rinseback Volume (mL) 250 mL   Blood Volume Processed (Liters) 51.5 L   Dialyzer Clearance Lightly streaked   Duration of Treatment 150 minutes   Additional Fluid Intake (mL) 600 mL   Total UF (mL) 1799 mL   Net Fluid Removal 949   Patient Response to Treatment hypotensive   Post-Hemodialysis Comments see notes     HD TX End after 2.5 hrs treatment due to pt complaints of blurry vision and feeling light headed and hypotension. Milly NP on unit, informed to end treatment at this time. Pt received 400 ml NS bolus per verbal orders NP. Pt AAO, NAD. Net removal 949 ml.

## 2024-03-01 NOTE — PLAN OF CARE
[facility] - Telemetry Stepdown  Discharge Final Note    Primary Care Provider: Pavel Calixto MD    Expected Discharge Date: 3/1/2024    Final Discharge Note (most recent)       Final Note - 03/01/24 1430          Final Note    Assessment Type Final Discharge Note     Anticipated Discharge Disposition Skilled Nursing Facility        Post-Acute Status    Post-Acute Authorization Placement     Post-Acute Placement Status Set-up Complete/Auth obtained   [facility] SNF    Discharge Delays None known at this time                     Important Message from Medicare  Important Message from Medicare regarding Discharge Appeal Rights: Given to patient/caregiver, Explained to patient/caregiver, Signed/date by patient/caregiver     Date IMM was signed: 03/01/24  Time IMM was signed: 1053    Contact Info       [id number]   Specialty: Transplant    [address] [facility] [address]   Phone: [phone]       Next Steps: Follow up          Future Appointments   Date Time Provider Department Center   3/13/2024  2:00 PM Sherine Rocha, AZUL [facility] ENDOCN [facility]   3/25/2024  3:00 PM Kathy Martínez MD [facility] PAL MED [facility]   3/26/2024 12:00 PM LAB, APPOINTMENT [facility] LAB VNP [facility]   3/26/2024  1:00 PM Deion Cheng MD [facility] HEARTTX [facility]   5/29/2024  9:30 AM [facility]xto [facility] LCS[facility]  Ext. [phone]

## 2024-03-01 NOTE — PROGRESS NOTES
[facility] H[facility]IAL[facility] NOTE     Patient currently on hemodialysis for removal of uremic toxins .     Patient seen and evaluated on hemodialysis, tolerating treatment, see HD flowsheet for vitals and assessments.      No Hypotension, chest pain, shortness of breath, cramping, nausea or vomiting.     Target UF: 2 L as tolerated, keep MAP >65.  Hgb 8.4 - continue EPO  Phos 1.5 - liberalize diet, no binders  Encouraged patient to limit fluid intake to 32 oz per day.   No lab stick/BP intake on access site  Continue to monitor intake and output, daily weights   Will follow closely and continue dialysis treatments while in-patient  Found w new LL PNA - started on doxycycline, co cough    Addendum:   Treatment terminated after 2.5 hrs due to significant hypotension with HD. SBP in the 70-80s. Received 600 mL IV bolus. Pending repeat BP reading.   Oxygenating well on RA.     MARILYN Melissa DNP, APRN, FNP-C  Department of Nephrology  [facility]  Pager: [phone]

## 2024-03-05 LAB — BACTERIA SPEC ANAEROBE CULT: NORMAL

## 2024-03-06 LAB
ACID FAST MOD KINY STN SPEC: NORMAL
MYCOBACTERIUM SPEC QL CULT: NORMAL

## 2024-03-07 LAB
ACID FAST MOD KINY STN SPEC: NORMAL
MYCOBACTERIUM SPEC QL CULT: NORMAL

## 2024-03-11 ENCOUNTER — TELEPHONE (OUTPATIENT)
Dept: ELECTROPHYSIOLOGY | Facility: CLINIC | Age: 74
End: 2024-03-11
Payer: MEDICARE

## 2024-03-11 ENCOUNTER — TELEPHONE (OUTPATIENT)
Dept: CARDIOLOGY | Facility: CLINIC | Age: 74
End: 2024-03-11
Payer: MEDICARE

## 2024-03-11 DIAGNOSIS — I48.3 TYPICAL ATRIAL FLUTTER: Primary | ICD-10-CM

## 2024-03-11 NOTE — TELEPHONE ENCOUNTER
Follow up contact with patients spouse.  Requesting assistance with holter monitor after wearing the device as ordered.  Instructed patients spouse, this will be directed to EP for advice on follow up and guidance with turning the cardiac monitor in after only use for 14 days due to hospital events/admissions.  Pts spouse states understanding.  Office message sent to Dr Frances for follow up.

## 2024-03-12 ENCOUNTER — TELEPHONE (OUTPATIENT)
Dept: ELECTROPHYSIOLOGY | Facility: CLINIC | Age: 74
End: 2024-03-12
Payer: MEDICARE

## 2024-03-12 ENCOUNTER — TELEPHONE (OUTPATIENT)
Dept: OBSTETRICS AND GYNECOLOGY | Facility: CLINIC | Age: 74
End: 2024-03-12
Payer: MEDICARE

## 2024-03-12 DIAGNOSIS — I48.3 TYPICAL ATRIAL FLUTTER: Primary | ICD-10-CM

## 2024-03-12 DIAGNOSIS — Z12.31 BREAST CANCER SCREENING BY MAMMOGRAM: Primary | ICD-10-CM

## 2024-03-12 NOTE — TELEPHONE ENCOUNTER
Cristina Crandall Staff3 hours ago (11:28 AM)     JR  Pt needs mmg orders sent to  fax #[phone] or [phone]     Lily Green [phone]  Cristina Crandall3 hours ago (11:25 AM

## 2024-03-13 ENCOUNTER — TELEPHONE (OUTPATIENT)
Dept: CARDIOLOGY | Facility: HOSPITAL | Age: 74
End: 2024-03-13
Payer: MEDICARE

## 2024-03-13 NOTE — TELEPHONE ENCOUNTER
----- Message from Angelia Vanessa sent at 3/12/2024  9:51 AM CDT -----  Regarding: Event monitor ?'s  Deric [phone] pt spouse would like a call in ref to a monitor his wife had put on while in the hospital.    Thanks      Bi-Rhombic Flap Text: The defect edges were debeveled with a #15 scalpel blade.  Given the location of the defect and the proximity to free margins a bi-rhombic flap was deemed most appropriate.  Using a sterile surgical marker, an appropriate rhombic flap was drawn incorporating the defect. The area thus outlined was incised deep to adipose tissue with a #15 scalpel blade.  The skin margins were undermined to an appropriate distance in all directions utilizing iris scissors.

## 2024-03-13 NOTE — TELEPHONE ENCOUNTER
Return call to Mr. Leos in relation to cardiac monitor for Mrs. Green. Mr. Leos stated that he reached out to [employer] monitoring and was provided instructions on returning of monitor.Mr. Leos has no further questions nor concerns regarding the monitor. Mr. Leos appreciate return call.

## 2024-03-18 LAB — FUNGUS SPEC CULT: NORMAL

## 2024-03-19 ENCOUNTER — TELEPHONE (OUTPATIENT)
Dept: ELECTROPHYSIOLOGY | Facility: CLINIC | Age: 74
End: 2024-03-19
Payer: MEDICARE

## 2024-03-19 LAB
OHS CV HOLTER SINUS AVERAGE HR: 74
OHS CV HOLTER SINUS MAX HR: 101
OHS CV HOLTER SINUS MIN HR: 66

## 2024-03-19 NOTE — TELEPHONE ENCOUNTER
3/15 Attempted to reach Ms Green but unable to leave a message. Lily Ivey, RN    3/19  Spoke with patient and made her aware that results of the monitor have not been returned and she will be notified as soon as available. Lily ivey RN      ----- Message from Deepa Cavazos MA sent at 3/11/2024  1:05 PM CDT -----  Regarding: FW: questions with monitor/holter  Attempted to call patient already to schedule appointment but got no answer. She does however have questions regarding holter monitor that was done.   ----- Message -----  From: Lio Bryson RN  Sent: 3/11/2024  12:24 PM CDT  To: LENIN Frances MD; Juan Daniel Larsen Staff  Subject: questions with monitor/holter                    Hi good morning,      Pts spouse callilng with questions/concerns over the holter monitor.  This family member called Dr Carlisle in interventional cardiology however patient will need direction for follow up with EP.    Pt has been to ER/ICU/hospital stays and was given monitor. Pt did lose the first monitor with a hospital admission and reports the monitor was restarted however only worn for 14 days.    Can you advise patient?      Thank you  lio

## 2024-03-26 ENCOUNTER — OFFICE VISIT (OUTPATIENT)
Dept: TRANSPLANT | Facility: CLINIC | Age: 74
End: 2024-03-26
Payer: MEDICARE

## 2024-03-26 ENCOUNTER — DOCUMENTATION ONLY (OUTPATIENT)
Dept: TRANSPLANT | Facility: CLINIC | Age: 74
End: 2024-03-26

## 2024-03-26 ENCOUNTER — HOSPITAL ENCOUNTER (INPATIENT)
Facility: HOSPITAL | Age: 74
LOS: 2 days | Discharge: NURSING, REHAB OR LONG-TERM CARE | DRG: 640 | End: 2024-03-29
Attending: EMERGENCY MEDICINE | Admitting: STUDENT IN AN ORGANIZED HEALTH CARE EDUCATION/TRAINING PROGRAM
Payer: MEDICARE

## 2024-03-26 VITALS
HEART RATE: 72 BPM | HEIGHT: 65 IN | BODY MASS INDEX: 16.13 KG/M2 | SYSTOLIC BLOOD PRESSURE: 180 MMHG | WEIGHT: 96.81 LBS | DIASTOLIC BLOOD PRESSURE: 88 MMHG

## 2024-03-26 DIAGNOSIS — E87.6 HYPOKALEMIA: ICD-10-CM

## 2024-03-26 DIAGNOSIS — E87.6 HYPOKALEMIA: Primary | ICD-10-CM

## 2024-03-26 DIAGNOSIS — D63.1 ANEMIA IN ESRD (END-STAGE RENAL DISEASE): ICD-10-CM

## 2024-03-26 DIAGNOSIS — N18.6 ESRD (END STAGE RENAL DISEASE): Chronic | ICD-10-CM

## 2024-03-26 DIAGNOSIS — N18.6 ANEMIA IN ESRD (END-STAGE RENAL DISEASE): ICD-10-CM

## 2024-03-26 DIAGNOSIS — R07.9 CHEST PAIN: ICD-10-CM

## 2024-03-26 DIAGNOSIS — C7A.090 MALIGNANT CARCINOID TUMOR OF LUNG: ICD-10-CM

## 2024-03-26 DIAGNOSIS — I50.9 ACUTE DECOMPENSATED HEART FAILURE: Primary | ICD-10-CM

## 2024-03-26 DIAGNOSIS — I48.0 PAROXYSMAL ATRIAL FIBRILLATION: Chronic | ICD-10-CM

## 2024-03-26 PROBLEM — E03.9 HYPOTHYROID: Status: ACTIVE | Noted: 2024-03-26

## 2024-03-26 PROBLEM — I50.42 CHRONIC COMBINED SYSTOLIC AND DIASTOLIC HEART FAILURE: Status: ACTIVE | Noted: 2024-03-26

## 2024-03-26 LAB
ALBUMIN SERPL BCP-MCNC: 2.1 G/DL (ref 3.5–5.2)
ALP SERPL-CCNC: 98 U/L (ref 55–135)
ALT SERPL W/O P-5'-P-CCNC: 12 U/L (ref 10–44)
ANION GAP SERPL CALC-SCNC: 10 MMOL/L (ref 8–16)
AST SERPL-CCNC: 22 U/L (ref 10–40)
BASOPHILS # BLD AUTO: 0.03 K/UL (ref 0–0.2)
BASOPHILS NFR BLD: 0.3 % (ref 0–1.9)
BILIRUB SERPL-MCNC: 0.3 MG/DL (ref 0.1–1)
BUN SERPL-MCNC: 20 MG/DL (ref 8–23)
BUN SERPL-MCNC: 21 MG/DL (ref 6–30)
CALCIUM SERPL-MCNC: 8.9 MG/DL (ref 8.7–10.5)
CHLORIDE SERPL-SCNC: 88 MMOL/L (ref 95–110)
CHLORIDE SERPL-SCNC: 92 MMOL/L (ref 95–110)
CO2 SERPL-SCNC: 34 MMOL/L (ref 23–29)
CREAT SERPL-MCNC: 2.4 MG/DL (ref 0.5–1.4)
CREAT SERPL-MCNC: 2.5 MG/DL (ref 0.5–1.4)
DIFFERENTIAL METHOD BLD: ABNORMAL
EOSINOPHIL # BLD AUTO: 0.1 K/UL (ref 0–0.5)
EOSINOPHIL NFR BLD: 0.6 % (ref 0–8)
ERYTHROCYTE [DISTWIDTH] IN BLOOD BY AUTOMATED COUNT: 18.8 % (ref 11.5–14.5)
EST. GFR  (NO RACE VARIABLE): 20.8 ML/MIN/1.73 M^2
GLUCOSE SERPL-MCNC: 139 MG/DL (ref 70–110)
GLUCOSE SERPL-MCNC: 146 MG/DL (ref 70–110)
HCT VFR BLD AUTO: 23.7 % (ref 37–48.5)
HCT VFR BLD CALC: 40 %PCV (ref 36–54)
HGB BLD-MCNC: 7.2 G/DL (ref 12–16)
IMM GRANULOCYTES # BLD AUTO: 0.18 K/UL (ref 0–0.04)
IMM GRANULOCYTES NFR BLD AUTO: 1.6 % (ref 0–0.5)
LYMPHOCYTES # BLD AUTO: 0.6 K/UL (ref 1–4.8)
LYMPHOCYTES NFR BLD: 5 % (ref 18–48)
MAGNESIUM SERPL-MCNC: 1.9 MG/DL (ref 1.6–2.6)
MCH RBC QN AUTO: 29.5 PG (ref 27–31)
MCHC RBC AUTO-ENTMCNC: 30.4 G/DL (ref 32–36)
MCV RBC AUTO: 97 FL (ref 82–98)
MONOCYTES # BLD AUTO: 1.6 K/UL (ref 0.3–1)
MONOCYTES NFR BLD: 14.2 % (ref 4–15)
NEUTROPHILS # BLD AUTO: 8.7 K/UL (ref 1.8–7.7)
NEUTROPHILS NFR BLD: 78.3 % (ref 38–73)
NRBC BLD-RTO: 0 /100 WBC
PLATELET # BLD AUTO: 330 K/UL (ref 150–450)
PMV BLD AUTO: 10.3 FL (ref 9.2–12.9)
POC IONIZED CALCIUM: 1.21 MMOL/L (ref 1.06–1.42)
POC TCO2 (MEASURED): 35 MMOL/L (ref 23–29)
POTASSIUM BLD-SCNC: 2.5 MMOL/L (ref 3.5–5.1)
POTASSIUM SERPL-SCNC: 2.8 MMOL/L (ref 3.5–5.1)
PROT SERPL-MCNC: 6.8 G/DL (ref 6–8.4)
RBC # BLD AUTO: 2.44 M/UL (ref 4–5.4)
SAMPLE: ABNORMAL
SODIUM BLD-SCNC: 135 MMOL/L (ref 136–145)
SODIUM SERPL-SCNC: 136 MMOL/L (ref 136–145)
WBC # BLD AUTO: 11.09 K/UL (ref 3.9–12.7)

## 2024-03-26 PROCEDURE — 99215 OFFICE O/P EST HI 40 MIN: CPT | Mod: S$GLB,,, | Performed by: INTERNAL MEDICINE

## 2024-03-26 PROCEDURE — 4010F ACE/ARB THERAPY RXD/TAKEN: CPT | Mod: CPTII,S$GLB,, | Performed by: INTERNAL MEDICINE

## 2024-03-26 PROCEDURE — 85025 COMPLETE CBC W/AUTO DIFF WBC: CPT | Performed by: PHYSICIAN ASSISTANT

## 2024-03-26 PROCEDURE — 3079F DIAST BP 80-89 MM HG: CPT | Mod: CPTII,S$GLB,, | Performed by: INTERNAL MEDICINE

## 2024-03-26 PROCEDURE — 83735 ASSAY OF MAGNESIUM: CPT | Mod: 91 | Performed by: PHYSICIAN ASSISTANT

## 2024-03-26 PROCEDURE — 99999 PR PBB SHADOW E&M-EST. PATIENT-LVL V: CPT | Mod: PBBFAC,,, | Performed by: INTERNAL MEDICINE

## 2024-03-26 PROCEDURE — 99285 EMERGENCY DEPT VISIT HI MDM: CPT | Mod: 25

## 2024-03-26 PROCEDURE — 80047 BASIC METABLC PNL IONIZED CA: CPT

## 2024-03-26 PROCEDURE — 3066F NEPHROPATHY DOC TX: CPT | Mod: CPTII,S$GLB,, | Performed by: INTERNAL MEDICINE

## 2024-03-26 PROCEDURE — 3288F FALL RISK ASSESSMENT DOCD: CPT | Mod: CPTII,S$GLB,, | Performed by: INTERNAL MEDICINE

## 2024-03-26 PROCEDURE — 3008F BODY MASS INDEX DOCD: CPT | Mod: CPTII,S$GLB,, | Performed by: INTERNAL MEDICINE

## 2024-03-26 PROCEDURE — 93005 ELECTROCARDIOGRAM TRACING: CPT

## 2024-03-26 PROCEDURE — 1126F AMNT PAIN NOTED NONE PRSNT: CPT | Mod: CPTII,S$GLB,, | Performed by: INTERNAL MEDICINE

## 2024-03-26 PROCEDURE — 1111F DSCHRG MED/CURRENT MED MERGE: CPT | Mod: CPTII,S$GLB,, | Performed by: INTERNAL MEDICINE

## 2024-03-26 PROCEDURE — 1101F PT FALLS ASSESS-DOCD LE1/YR: CPT | Mod: CPTII,S$GLB,, | Performed by: INTERNAL MEDICINE

## 2024-03-26 PROCEDURE — 3077F SYST BP >= 140 MM HG: CPT | Mod: CPTII,S$GLB,, | Performed by: INTERNAL MEDICINE

## 2024-03-26 PROCEDURE — 80053 COMPREHEN METABOLIC PANEL: CPT | Mod: 91 | Performed by: PHYSICIAN ASSISTANT

## 2024-03-26 PROCEDURE — 93010 ELECTROCARDIOGRAM REPORT: CPT | Mod: ,,, | Performed by: INTERNAL MEDICINE

## 2024-03-26 PROCEDURE — 25000003 PHARM REV CODE 250: Performed by: FAMILY MEDICINE

## 2024-03-26 PROCEDURE — G0378 HOSPITAL OBSERVATION PER HR: HCPCS

## 2024-03-26 PROCEDURE — 1159F MED LIST DOCD IN RCRD: CPT | Mod: CPTII,S$GLB,, | Performed by: INTERNAL MEDICINE

## 2024-03-26 RX ORDER — MECLIZINE HCL 12.5 MG 12.5 MG/1
25 TABLET ORAL 3 TIMES DAILY PRN
Status: DISCONTINUED | OUTPATIENT
Start: 2024-03-26 | End: 2024-03-29 | Stop reason: HOSPADM

## 2024-03-26 RX ORDER — TALC
6 POWDER (GRAM) TOPICAL NIGHTLY PRN
Status: DISCONTINUED | OUTPATIENT
Start: 2024-03-26 | End: 2024-03-29 | Stop reason: HOSPADM

## 2024-03-26 RX ORDER — DICYCLOMINE HYDROCHLORIDE 10 MG/1
10 CAPSULE ORAL 3 TIMES DAILY
Status: DISCONTINUED | OUTPATIENT
Start: 2024-03-26 | End: 2024-03-29 | Stop reason: HOSPADM

## 2024-03-26 RX ORDER — ONDANSETRON 4 MG/1
4 TABLET, FILM COATED ORAL EVERY 4 HOURS PRN
COMMUNITY
Start: 2024-03-25

## 2024-03-26 RX ORDER — LEVOTHYROXINE SODIUM 75 UG/1
75 TABLET ORAL
Status: ON HOLD | COMMUNITY
Start: 2024-03-25 | End: 2024-03-29 | Stop reason: HOSPADM

## 2024-03-26 RX ORDER — IBUPROFEN 200 MG
16 TABLET ORAL
Status: DISCONTINUED | OUTPATIENT
Start: 2024-03-26 | End: 2024-03-29 | Stop reason: HOSPADM

## 2024-03-26 RX ORDER — LEVOTHYROXINE SODIUM 88 UG/1
88 TABLET ORAL
Status: DISCONTINUED | OUTPATIENT
Start: 2024-03-27 | End: 2024-03-29 | Stop reason: HOSPADM

## 2024-03-26 RX ORDER — FERROUS GLUCONATE 324(37.5)
324 TABLET ORAL
Status: DISCONTINUED | OUTPATIENT
Start: 2024-03-27 | End: 2024-03-29 | Stop reason: HOSPADM

## 2024-03-26 RX ORDER — AMIODARONE HYDROCHLORIDE 200 MG/1
200 TABLET ORAL 2 TIMES DAILY
Status: DISCONTINUED | OUTPATIENT
Start: 2024-03-26 | End: 2024-03-29 | Stop reason: HOSPADM

## 2024-03-26 RX ORDER — CARVEDILOL 3.12 MG/1
6.25 TABLET ORAL 2 TIMES DAILY WITH MEALS
Status: DISCONTINUED | OUTPATIENT
Start: 2024-03-26 | End: 2024-03-29 | Stop reason: HOSPADM

## 2024-03-26 RX ORDER — ONDANSETRON HYDROCHLORIDE 2 MG/ML
4 INJECTION, SOLUTION INTRAVENOUS EVERY 8 HOURS PRN
Status: DISCONTINUED | OUTPATIENT
Start: 2024-03-26 | End: 2024-03-29 | Stop reason: HOSPADM

## 2024-03-26 RX ORDER — POLYETHYLENE GLYCOL 3350 17 G/17G
17 POWDER, FOR SOLUTION ORAL DAILY
Status: DISCONTINUED | OUTPATIENT
Start: 2024-03-27 | End: 2024-03-29 | Stop reason: HOSPADM

## 2024-03-26 RX ORDER — HEPARIN SODIUM 5000 [USP'U]/ML
5000 INJECTION, SOLUTION INTRAVENOUS; SUBCUTANEOUS EVERY 12 HOURS
Status: DISCONTINUED | OUTPATIENT
Start: 2024-03-26 | End: 2024-03-26

## 2024-03-26 RX ORDER — GLUCAGON 1 MG
1 KIT INJECTION
Status: DISCONTINUED | OUTPATIENT
Start: 2024-03-26 | End: 2024-03-29 | Stop reason: HOSPADM

## 2024-03-26 RX ORDER — OXYCODONE HYDROCHLORIDE 5 MG/1
5 TABLET ORAL EVERY 6 HOURS PRN
Status: DISCONTINUED | OUTPATIENT
Start: 2024-03-26 | End: 2024-03-29 | Stop reason: HOSPADM

## 2024-03-26 RX ORDER — INSULIN GLARGINE 100 [IU]/ML
3 INJECTION, SOLUTION SUBCUTANEOUS NIGHTLY
COMMUNITY
End: 2024-03-26 | Stop reason: CLARIF

## 2024-03-26 RX ORDER — SEVELAMER CARBONATE 800 MG/1
1600 TABLET, FILM COATED ORAL
Status: DISCONTINUED | OUTPATIENT
Start: 2024-03-27 | End: 2024-03-28

## 2024-03-26 RX ORDER — MIDODRINE HYDROCHLORIDE 5 MG/1
10 TABLET ORAL 2 TIMES DAILY WITH MEALS
Status: DISCONTINUED | OUTPATIENT
Start: 2024-03-26 | End: 2024-03-26

## 2024-03-26 RX ORDER — HEPARIN SODIUM 5000 [USP'U]/ML
5000 INJECTION, SOLUTION INTRAVENOUS; SUBCUTANEOUS EVERY 8 HOURS
Status: DISCONTINUED | OUTPATIENT
Start: 2024-03-26 | End: 2024-03-26

## 2024-03-26 RX ORDER — ASPIRIN 81 MG/1
81 TABLET ORAL DAILY
Status: DISCONTINUED | OUTPATIENT
Start: 2024-03-27 | End: 2024-03-29 | Stop reason: HOSPADM

## 2024-03-26 RX ORDER — ALUMINUM HYDROXIDE, MAGNESIUM HYDROXIDE, AND SIMETHICONE 1200; 120; 1200 MG/30ML; MG/30ML; MG/30ML
30 SUSPENSION ORAL 4 TIMES DAILY PRN
Status: DISCONTINUED | OUTPATIENT
Start: 2024-03-26 | End: 2024-03-29 | Stop reason: HOSPADM

## 2024-03-26 RX ORDER — VENLAFAXINE 37.5 MG/1
37.5 TABLET ORAL DAILY
Status: DISCONTINUED | OUTPATIENT
Start: 2024-03-27 | End: 2024-03-29 | Stop reason: HOSPADM

## 2024-03-26 RX ORDER — NALOXONE HCL 0.4 MG/ML
0.02 VIAL (ML) INJECTION
Status: DISCONTINUED | OUTPATIENT
Start: 2024-03-26 | End: 2024-03-29 | Stop reason: HOSPADM

## 2024-03-26 RX ORDER — LOPERAMIDE HYDROCHLORIDE 2 MG/1
2 CAPSULE ORAL ONCE AS NEEDED
COMMUNITY
Start: 2024-03-05

## 2024-03-26 RX ORDER — PANTOPRAZOLE SODIUM 40 MG/1
40 TABLET, DELAYED RELEASE ORAL
Status: DISCONTINUED | OUTPATIENT
Start: 2024-03-26 | End: 2024-03-29 | Stop reason: HOSPADM

## 2024-03-26 RX ORDER — CARVEDILOL 6.25 MG/1
6.25 TABLET ORAL 2 TIMES DAILY WITH MEALS
Qty: 60 TABLET | Refills: 11 | Status: SHIPPED | OUTPATIENT
Start: 2024-03-26 | End: 2024-04-16 | Stop reason: SDUPTHER

## 2024-03-26 RX ORDER — ACETAMINOPHEN 500 MG
1000 TABLET ORAL EVERY 8 HOURS PRN
Status: DISCONTINUED | OUTPATIENT
Start: 2024-03-26 | End: 2024-03-29 | Stop reason: HOSPADM

## 2024-03-26 RX ORDER — LEVOTHYROXINE SODIUM 75 UG/1
75 TABLET ORAL
Status: DISCONTINUED | OUTPATIENT
Start: 2024-03-27 | End: 2024-03-26

## 2024-03-26 RX ORDER — IBUPROFEN 200 MG
24 TABLET ORAL
Status: DISCONTINUED | OUTPATIENT
Start: 2024-03-26 | End: 2024-03-29 | Stop reason: HOSPADM

## 2024-03-26 RX ORDER — CARVEDILOL 6.25 MG/1
6.25 TABLET ORAL 2 TIMES DAILY WITH MEALS
Qty: 60 TABLET | Refills: 11 | Status: SHIPPED | OUTPATIENT
Start: 2024-03-26 | End: 2024-03-26 | Stop reason: SDUPTHER

## 2024-03-26 RX ORDER — ALBUTEROL SULFATE 90 UG/1
2 AEROSOL, METERED RESPIRATORY (INHALATION) EVERY 6 HOURS PRN
Status: DISCONTINUED | OUTPATIENT
Start: 2024-03-26 | End: 2024-03-29 | Stop reason: HOSPADM

## 2024-03-26 RX ORDER — ATORVASTATIN CALCIUM 40 MG/1
40 TABLET, FILM COATED ORAL NIGHTLY
Status: DISCONTINUED | OUTPATIENT
Start: 2024-03-26 | End: 2024-03-29 | Stop reason: HOSPADM

## 2024-03-26 RX ORDER — PREGABALIN 75 MG/1
75 CAPSULE ORAL EVERY OTHER DAY
Status: DISCONTINUED | OUTPATIENT
Start: 2024-03-27 | End: 2024-03-29 | Stop reason: HOSPADM

## 2024-03-26 RX ORDER — FERROUS SULFATE, DRIED 160(50) MG
1 TABLET, EXTENDED RELEASE ORAL DAILY
Status: DISCONTINUED | OUTPATIENT
Start: 2024-03-27 | End: 2024-03-29 | Stop reason: HOSPADM

## 2024-03-26 RX ORDER — BENZONATATE 100 MG/1
100 CAPSULE ORAL 3 TIMES DAILY PRN
Status: DISCONTINUED | OUTPATIENT
Start: 2024-03-26 | End: 2024-03-29 | Stop reason: HOSPADM

## 2024-03-26 RX ORDER — SODIUM CHLORIDE 0.9 % (FLUSH) 0.9 %
10 SYRINGE (ML) INJECTION EVERY 12 HOURS PRN
Status: DISCONTINUED | OUTPATIENT
Start: 2024-03-26 | End: 2024-03-29 | Stop reason: HOSPADM

## 2024-03-26 RX ORDER — DICYCLOMINE HYDROCHLORIDE 10 MG/1
10 CAPSULE ORAL 3 TIMES DAILY
COMMUNITY
Start: 2024-03-14

## 2024-03-26 RX ADMIN — CARVEDILOL 6.25 MG: 6.25 TABLET, FILM COATED ORAL at 06:03

## 2024-03-26 RX ADMIN — PANTOPRAZOLE SODIUM 40 MG: 40 TABLET, DELAYED RELEASE ORAL at 06:03

## 2024-03-26 RX ADMIN — GUAIFENESIN AND DEXTROMETHORPHAN HYDROBROMIDE 1 TABLET: 600; 30 TABLET, EXTENDED RELEASE ORAL at 10:03

## 2024-03-26 RX ADMIN — AMIODARONE HYDROCHLORIDE 200 MG: 200 TABLET ORAL at 10:03

## 2024-03-26 RX ADMIN — DICYCLOMINE HYDROCHLORIDE 10 MG: 10 CAPSULE ORAL at 10:03

## 2024-03-26 RX ADMIN — ATORVASTATIN CALCIUM 40 MG: 40 TABLET, FILM COATED ORAL at 10:03

## 2024-03-26 RX ADMIN — POTASSIUM BICARBONATE 25 MEQ: 978 TABLET, EFFERVESCENT ORAL at 06:03

## 2024-03-26 RX ADMIN — VALSARTAN 20 MG: 40 TABLET, FILM COATED ORAL at 10:03

## 2024-03-26 NOTE — ASSESSMENT & PLAN NOTE
- holding home torsemide at this time while correcting hypokalemia  - monitor tele  - strict I/Os  - daily weights  - low sodium fluid restricted renal gluten free diet  - continue to closely monitor

## 2024-03-26 NOTE — SUBJECTIVE & OBJECTIVE
Past Medical History:   Diagnosis Date    Anxiety     Celiac disease 2018    Celiac disease     Depression     Diabetes mellitus     Family history of breast cancer in mother      at age 68    Hyperlipidemia     Hypertension     Meniere disease     Meniere's disease, unspecified ear     Menopause     Peptic ulcer     Peritonitis 2023    Reflux esophagitis     Urinary tract infection     Vaginal infection     Vaginal Pap smear 2014    Pap/Hpv Negative        Past Surgical History:   Procedure Laterality Date    APPENDECTOMY      BREAST SURGERY      Tags    CARPAL TUNNEL RELEASE Bilateral 2017    ,     CLOSURE, COLOSTOMY Left 2023    Procedure: CLOSURE, COLOSTOMY;  Surgeon: Manuel Javier MD;  Location: [facility] OR;  Service: General;  Laterality: Left;    COLONOSCOPY  2018    Normal  ( Juan A)     COLOSTOMY Right 2023    Procedure: CREATION, COLOSTOMY;  Surgeon: Manuel Javier MD;  Location: [facility] OR;  Service: General;  Laterality: Right;    CORONARY ANGIOGRAPHY N/A 2024    Procedure: ANGIOGRAM, CORONARY ARTERY;  Surgeon: Todd Carlisle MD;  Location: [facility] CATH LAB;  Service: Cardiology;  Laterality: N/A;    DILATION AND CURETTAGE OF UTERUS  1986    DUPUYTREN CONTRACTURE RELEASE Bilateral 2017    ESOPHAGOGASTRODUODENOSCOPY N/A 2024    Procedure: EGD (ESOPHAGOGASTRODUODENOSCOPY);  Surgeon: Yamilet Walters MD;  Location: [facility] ENDO (2ND FLR);  Service: Gastroenterology;  Laterality: N/A;    ESOPHAGOGASTRODUODENOSCOPY N/A 2024    Procedure: EGD (ESOPHAGOGASTRODUODENOSCOPY);  Surgeon: Yamilet Walters MD;  Location: [facility] ENDO (2ND FLR);  Service: Gastroenterology;  Laterality: N/A;    HYSTERECTOMY      BEAU w/ appy, no BSO     IMPLANTATION OF LEADLESS PACEMAKER N/A 2024    Procedure: INSERTION, CARDIAC PACEMAKER, LEADLESS;  Surgeon: LENIN Frances MD;  Location: [facility] EP LAB;  Service: Cardiology;  Laterality: N/A;  SB, LEADLESS PPM  SAM (MICRA), ANES, EH, CVICU [id number]    INJECTION OF NEUROLYTIC AGENT AROUND LUMBAR SYMPATHETIC NERVE N/A 1/6/2022    Procedure: BLOCK, LUMBAR SYMPATHETIC;  Surgeon: Souleymane Rizo Jr., MD;  Location: [facility] PAIN MGT;  Service: Pain Management;  Laterality: N/A;  no pacemaker   pt is diabetic     INJECTION OF NEUROLYTIC AGENT AROUND LUMBAR SYMPATHETIC NERVE N/A 8/25/2022    Procedure: BLOCK, LUMBAR SYMPATHETIC;  Surgeon: Souleymane Rizo Jr., MD;  Location: [facility] PAIN MGT;  Service: Pain Management;  Laterality: N/A;  diabetic     INSERTION OF TUNNELED CENTRAL VENOUS HEMODIALYSIS CATHETER Right 1/25/2023    Procedure: INSERTION, CATHETER, HEMODIALYSIS, DUAL LUMEN;  Surgeon: Manuel Javier MD;  Location: [facility] OR;  Service: General;  Laterality: Right;    INSERTION, CATHETER, TUNNELED Left 1/28/2023    Procedure: Insertion,catheter,tunneled;  Surgeon: Watson Fontenot MD;  Location: [facility] OR;  Service: General;  Laterality: Left;    LAPAROTOMY, EXPLORATORY N/A 1/14/2023    Procedure: LAPAROTOMY, EXPLORATORY;  Surgeon: Manuel Javier MD;  Location: [facility] OR;  Service: General;  Laterality: N/A;    LAPAROTOMY, EXPLORATORY N/A 1/16/2023    Procedure: LAPAROTOMY, EXPLORATORY;  Surgeon: Manuel Javier MD;  Location: [facility] OR;  Service: General;  Laterality: N/A;    LEFT HEART CATHETERIZATION Left 2/21/2024    Procedure: Left heart cath;  Surgeon: Todd Carlisle MD;  Location: [facility] CATH LAB;  Service: Cardiology;  Laterality: Left;    LYMPHADENECTOMY Right 1/2/2024    Procedure: LYMPHADENECTOMY;  Surgeon: Tan Thomson MD;  [address]  Service: Cardiothoracic;  Laterality: Right;    PACEMAKER, TEMPORARY, TRANSVENOUS, PEDIATRIC Right 1/12/2024    Procedure: Pacemaker, Temporary, Transvenous;  Surgeon: Todd Carlisle MD;  Location: [facility] CATH LAB;  Service: Cardiology;  Laterality: Right;    REMOVAL OF CATHETER Right 1/28/2023    Procedure: REMOVAL, CATHETER;  Surgeon: Watson CORNEJO  MD Corie;  Location: [facility] OR;  Service: General;  Laterality: Right;    REMOVAL, TUNNELED CATH Right 1/25/2023    Procedure: REMOVAL, TUNNELED CATH;  Surgeon: Manuel Javier MD;  Location: [facility] OR;  Service: General;  Laterality: Right;    ROBOTIC BRONCHOSCOPY N/A 10/20/2023    Procedure: ROBOTIC BRONCHOSCOPY;  Surgeon: Mayda Monzon MD;  [facility];  Service: Pulmonary;  Laterality: N/A;    SHOULDER SURGERY  2009 & 2010    right rotator cuff    THORACOTOMY Right 1/2/2024    Procedure: THORACOTOMY;  Surgeon: Tan Thomson MD;  [facility];  Service: Cardiothoracic;  Laterality: Right;    THORACOTOMY, WITH INITIAL THERAPEUTIC WEDGE RESECTION OF LUNG Right 1/2/2024    Procedure: THORACOTOMY, WITH INITIAL THERAPEUTIC WEDGE RESECTION OF LUNG;  Surgeon: Tan Thomson MD;  [facility];  Service: Cardiothoracic;  Laterality: Right;    TONSILLECTOMY      UPPER GASTROINTESTINAL ENDOSCOPY  04/2018       Review of patient's allergies indicates:   Allergen Reactions    Crestor [rosuvastatin] Swelling    Gluten protein        No current facility-administered medications on file prior to encounter.     Current Outpatient Medications on File Prior to Encounter   Medication Sig    amiodarone (PACERONE) 200 MG Tab Take 1 tablet (200 mg total) by mouth 2 (two) times daily.    aspirin (ECOTRIN) 81 MG EC tablet Take 1 tablet (81 mg total) by mouth once daily.    atorvastatin (LIPITOR) 40 MG tablet Take 1 tablet (40 mg total) by mouth every evening.    B complex-vitamin C-folic acid (MELLISSA-BENJY) 0.8 mg Tab Take 1 tablet (0.8 mg total) by mouth once daily.    betahistine HCl (BETAHISTINE, BULK, MISC)     blood sugar diagnostic (TRUE METRIX GLUCOSE TEST STRIP) Strp USE ONE STRIP FOR TESTING 2 TIMES A DAY    blood-glucose meter kit Use to test twice a day    calcium-vitamin D3 (OS-CIPRIANO 500 + D3) 500 mg-5 mcg (200 unit) per tablet Take 1 tablet by mouth once daily.    carvediloL  "(COREG) 6.25 MG tablet Take 1 tablet (6.25 mg total) by mouth 2 (two) times daily with meals.    dicyclomine (BENTYL) 10 MG capsule Take 10 mg by mouth 3 (three) times daily.    ferrous gluconate (FERGON) 324 MG tablet Take 1 tablet (324 mg total) by mouth daily with breakfast.    insulin aspart U-100 (NOVOLOG) 100 unit/mL (3 mL) InPn pen Inject before meals:  150-200=+1, 201-250=+2, 251-300=+3; 301-350=+4, over 350=+5 units    lancets Misc 1 lancet by Misc.(Non-Drug; Combo Route) route 4 (four) times daily.    levothyroxine (SYNTHROID) 75 MCG tablet Take 75 mcg by mouth before breakfast.    loperamide (IMODIUM) 2 mg capsule Take 2 mg by mouth once as needed for Diarrhea.    meclizine (ANTIVERT) 25 mg tablet Take 1 tablet (25 mg total) by mouth 3 (three) times daily as needed for Dizziness.    midodrine (PROAMATINE) 10 MG tablet Take 1 tablet (10 mg total) by mouth 2 (two) times daily with meals.    nutritional supplements Liqd Take 237 mLs by mouth 4 (four) times daily.    ondansetron (ZOFRAN) 4 MG tablet Take 4 mg by mouth every 4 (four) hours as needed for Nausea.    pantoprazole (PROTONIX) 40 MG tablet Take 1 tablet (40 mg total) by mouth 2 (two) times daily before meals.    pen needle, diabetic (BD ULTRA-FINE MARIS PEN NEEDLE) 32 gauge x 5/32" Ndle 1 Device by Misc.(Non-Drug; Combo Route) route 4 (four) times daily with meals and nightly.    pregabalin (LYRICA) 75 MG capsule Take 1 capsule (75 mg total) by mouth every other day. Give after HD    sevelamer HCL (RENAGEL) 800 MG Tab Take 2 tablets (1,600 mg total) by mouth 3 (three) times daily with meals.    torsemide (DEMADEX) 20 MG Tab Take 20 mg by mouth 2 (two) times daily.    valsartan (DIOVAN) 40 MG tablet Take 0.5 tablets (20 mg total) by mouth 2 (two) times daily.    venlafaxine (EFFEXOR) 37.5 MG Tab Take 1 tablet (37.5 mg total) by mouth once daily.    vit C,F-Ak-aqjqp-lutein-zeaxan (PRESERVISION AREDS 2) [id number] mg-unit-mg-mg Cap     [DISCONTINUED] " carvediloL (COREG) 6.25 MG tablet Take 1 tablet (6.25 mg total) by mouth 2 (two) times daily with meals.    [DISCONTINUED] insulin glargine 100 units/mL SubQ pen Inject 3 mLs into the skin every evening. PRN    [DISCONTINUED] metoprolol tartrate (LOPRESSOR) 25 MG tablet Take 0.5 tablets (12.5 mg total) by mouth 2 (two) times daily.    [DISCONTINUED] patiromer calcium sorbitex (VELTASSA) 8.4 gram PwPk Take 2 packets (16.8 g total) by mouth once daily.     Family History       Problem Relation (Age of Onset)    Arthritis Father    Breast cancer Mother, Paternal Aunt    Cancer Mother, Paternal Aunt    Diabetes Paternal Grandfather    Hyperlipidemia Sister    Vision loss Father, Mother, Sister          Tobacco Use    Smoking status: Never    Smokeless tobacco: Never   Substance and Sexual Activity    Alcohol use: No    Drug use: Never    Sexual activity: Not Currently     Partners: Male     Birth control/protection: See Surgical Hx     Comment: :      Review of Systems   Constitutional:  Positive for appetite change and fatigue. Negative for chills and fever.   HENT:  Negative for sore throat and trouble swallowing.    Eyes:  Negative for photophobia and visual disturbance.   Respiratory:  Positive for cough. Negative for shortness of breath and wheezing.    Cardiovascular:  Negative for chest pain, palpitations and leg swelling.   Gastrointestinal:  Negative for abdominal distention, abdominal pain, diarrhea, nausea and vomiting.   Genitourinary:  Negative for dysuria and hematuria.   Musculoskeletal:  Negative for neck pain and neck stiffness.   Skin:  Negative for rash and wound.   Neurological:  Positive for weakness. Negative for seizures, syncope, light-headedness, numbness and headaches.   Psychiatric/Behavioral:  Negative for confusion and decreased concentration.      Objective:     Vital Signs (Most Recent):  Temp: 97.7 °F (36.5 °C) (03/26/24 1429)  Pulse: 77 (03/26/24 1659)  Resp: 16 (03/26/24  1659)  BP: 123/61 (03/26/24 1659)  SpO2: 100 % (03/26/24 1659) Vital Signs (24h Range):  Temp:  [97.7 °F (36.5 °C)] 97.7 °F (36.5 °C)  Pulse:  [72-77] 77  Resp:  [12-20] 16  SpO2:  [100 %] 100 %  BP: (123-213)/() 123/61     Weight: 44 kg (97 lb)  Body mass index is 16.14 kg/m².     Physical Exam  Constitutional:       General: She is not in acute distress.     Appearance: She is not toxic-appearing or diaphoretic.      Comments: Chronically ill appearing, frail, underweight   HENT:      Head: Normocephalic and atraumatic.      Nose: Nose normal.      Mouth/Throat:      Mouth: Mucous membranes are dry.   Eyes:      General: No scleral icterus.     Extraocular Movements: Extraocular movements intact.      Pupils: Pupils are equal, round, and reactive to light.   Cardiovascular:      Rate and Rhythm: Normal rate. Rhythm irregular.   Pulmonary:      Effort: Pulmonary effort is normal. No respiratory distress.      Breath sounds: Rales present. No wheezing.   Abdominal:      General: Abdomen is flat. There is no distension.      Palpations: Abdomen is soft.      Tenderness: There is no abdominal tenderness. There is no guarding.   Musculoskeletal:         General: Normal range of motion.      Cervical back: Normal range of motion and neck supple. No rigidity.      Right lower leg: Edema present.      Left lower leg: Edema present.   Skin:     General: Skin is warm and dry.   Neurological:      General: No focal deficit present.      Mental Status: She is alert and oriented to person, place, and time.   Psychiatric:         Mood and Affect: Mood normal.         Behavior: Behavior normal.              CRANIAL NERVES     CN III, IV, VI   Pupils are equal, round, and reactive to light.       Significant Labs: All pertinent labs within the past 24 hours have been reviewed.  CBC:   Recent Labs   Lab 03/26/24  1537 03/26/24  1546   WBC 11.09  --    HGB 7.2*  --    HCT 23.7* 40     --      CMP:   Recent Labs   Lab  03/26/24  1230 03/26/24  1537    136   K 2.7* 2.8*   CL 92* 92*   CO2 32* 34*   * 139*   BUN 19 20   CREATININE 2.2* 2.4*   CALCIUM 9.0 8.9   PROT 7.3 6.8   ALBUMIN 2.0* 2.1*   BILITOT 0.3 0.3   ALKPHOS 94 98   AST 21 22   ALT 12 12   ANIONGAP 13 10       Significant Imaging: I have reviewed all pertinent imaging results/findings within the past 24 hours.

## 2024-03-26 NOTE — HPI
For extended Hx see below and recent Hospital and Progress Notes in EMR.  Lauryn: Patient presents to ED today for hypokalemia. She was sent from SNF facility where she has reportedly continued to still be slowly improving and benifiting, currently using walker/wheelchair for ambulation and  states able to walk 150ft with assistance. She was sent from SNF to Cardiology clinic to establish care. Found to be hypokalemic and sent to the ED for further management.    In the ED patient afebrile and hemodynamically stable saturating well on 2L at rest. Reports unchanged symptoms of generalized fatigue and weakness but reports eagerness to return to SNF to continue PT/OT once able/appropriate. K+ 2.8. She is on HD MWF. Still makes urine and takes torsemide BID. She was given dose of po Kcl and admitted to the care of medicine for further evaluation and management.     Lily Green is a 73 year old white woman with celiac disease, diabetes mellitus type 2 with polyneuropathy, hypertension, hyperlipidemia, history of peptic ulcer disease, depression, anxiety, history of hysterectomy in 1987, history of peritonitis due to diffuse colonic ischemia status post total colectomy and end ileostomy creation on 1/14/2023, evacuation of intraabdominal hematoma, takedown of left lateral ileostomy and recreation of right lateral end ileostomy on 1/16/2023, elective robotic ileostomy reversal and ileocolonic anastomosis on 8/7/2023, irritable bowel syndrome, ESRD on HD MWF, right middle lobe carcinoid tumor status post robotic-assisted converted to right thoracotomy with therapeutic wedge resection and mediastinal lymph node dissection and repair of bleeding right inferior pulmonary vein on 1/2/2024, atrial fibrillation, [employer] Micra AV leadless cardiac pacemaker placement on 1/16/2024.              She was hospitalized at [facility] from 1/2/2024 to 2/6/2024 for right middle lobe carcinoid  tumor, parapneumonic effusion, atrial fibrillation with rapid ventricular response, tachy-eulogio syndrome, gastrointestinal bleeding while on heparin. She was discharged to [facility] skilled nursing facility.              She presented to [facility] Emergency Department on 2/12/2024 from the skilled nursing facility with increased work of breathing, dyspnea, and hypoxia. Iniitally admitted to MICU for emergent HD and resp failure. Found to have new cardiomyopathy and HFrEF. Managed for volume overload and also PNA. She was discharged to the skilled nursing facility on 3/1/2024.

## 2024-03-26 NOTE — H&P
[facility] - Emergency Dept  [facility] Medicine  History & Physical    Patient Name: Lily Green  MRN: [id number]  Patient Class: OP- Observation  Admission Date: 3/26/2024  Attending Physician: Dheeraj Chun MD   Primary Care Provider: Pavel Calixto MD         Patient information was obtained from patient, past medical records, and ER records.     Subjective:     Principal Problem:Hypokalemia    Chief Complaint:   Chief Complaint   Patient presents with    Abnormal Lab     Pt sent down from clinic for hypokalemia.  K+ 2.7        HPI: For extended Hx see below and recent Hospital and Progress Notes in EMR.  Breifly: Patient presents to ED today for hypokalemia. She was sent from SNF facility where she has reportedly continued to still be slowly improving and benifiting, currently using walker/wheelchair for ambulation and  states able to walk 150ft with assistance. She was sent from SNF to Cardiology clinic to establish care. Found to be hypokalemic and sent to the ED for further management.    In the ED patient afebrile and hemodynamically stable saturating well on 2L at rest. Reports unchanged symptoms of generalized fatigue and weakness but reports eagerness to return to SNF to continue PT/OT once able/appropriate. K+ 2.8. She is on HD MWF. Still makes urine and takes torsemide BID. She was given dose of po Kcl and admitted to the care of medicine for further evaluation and management.     Lily Green is a 73 year old white woman with celiac disease, diabetes mellitus type 2 with polyneuropathy, hypertension, hyperlipidemia, history of peptic ulcer disease, depression, anxiety, history of hysterectomy in 1987, history of peritonitis due to diffuse colonic ischemia status post total colectomy and end ileostomy creation on 1/14/2023, evacuation of intraabdominal hematoma, takedown of left lateral ileostomy and recreation of right lateral end ileostomy on 1/16/2023, elective  robotic ileostomy reversal and ileocolonic anastomosis on 2023, irritable bowel syndrome, ESRD on HD MWF, right middle lobe carcinoid tumor status post robotic-assisted converted to right thoracotomy with therapeutic wedge resection and mediastinal lymph node dissection and repair of bleeding right inferior pulmonary vein on 2024, atrial fibrillation, Medtronic Micra AV leadless cardiac pacemaker placement on 2024.              She was hospitalized at [facility] from 2024 to 2024 for right middle lobe carcinoid tumor, parapneumonic effusion, atrial fibrillation with rapid ventricular response, tachy-eulogio syndrome, gastrointestinal bleeding while on heparin. She was discharged to [facility] nursing [facility].              She presented to [facility] Emergency Department on 2024 from the skilled nursing facility with increased work of breathing, dyspnea, and hypoxia. Iniitally admitted to MICU for emergent HD and resp failure. Found to have new cardiomyopathy and HFrEF. Managed for volume overload and also PNA. She was discharged to the skilled nursing facility on 3/1/2024.     Past Medical History:   Diagnosis Date    Anxiety     Celiac disease 2018    Celiac disease     Depression     Diabetes mellitus     Family history of breast cancer in mother      at age 68    Hyperlipidemia     Hypertension     Meniere disease     Meniere's disease, unspecified ear     Menopause     Peptic ulcer     Peritonitis 2023    Reflux esophagitis     Urinary tract infection     Vaginal infection     Vaginal Pap smear 2014    Pap/Hpv Negative        Past Surgical History:   Procedure Laterality Date    APPENDECTOMY      BREAST SURGERY      Tags    CARPAL TUNNEL RELEASE Bilateral 2017    ,     CLOSURE, COLOSTOMY Left 2023    Procedure: CLOSURE, COLOSTOMY;  Surgeon: Manuel Javier MD;  Location: [facility] OR;   Service: General;  Laterality: Left;    COLONOSCOPY  04/2018    Normal  (Cornell Velazco)     COLOSTOMY Right 1/16/2023    Procedure: CREATION, COLOSTOMY;  Surgeon: Manuel Javier MD;  Location: [facility] OR;  Service: General;  Laterality: Right;    CORONARY ANGIOGRAPHY N/A 2/21/2024    Procedure: ANGIOGRAM, CORONARY ARTERY;  Surgeon: Todd Carlisle MD;  Location: [facility] CATH LAB;  Service: Cardiology;  Laterality: N/A;    DILATION AND CURETTAGE OF UTERUS  1986    DUPUYTREN CONTRACTURE RELEASE Bilateral 09/2017    ESOPHAGOGASTRODUODENOSCOPY N/A 1/24/2024    Procedure: EGD (ESOPHAGOGASTRODUODENOSCOPY);  Surgeon: Yamilet Walters MD;  Location: [facility] ENDO ([facility] FLR);  Service: Gastroenterology;  Laterality: N/A;    ESOPHAGOGASTRODUODENOSCOPY N/A 1/26/2024    Procedure: EGD (ESOPHAGOGASTRODUODENOSCOPY);  Surgeon: Yamilet Walters MD;  Location: [facility] ENDO ([facility] FLR);  Service: Gastroenterology;  Laterality: N/A;    HYSTERECTOMY  1987    BEAU w/ appy, no BSO     IMPLANTATION OF LEADLESS PACEMAKER N/A 1/16/2024    Procedure: INSERTION, CARDIAC PACEMAKER, LEADLESS;  Surgeon: LENIN Frances MD;  Location: [facility] EP LAB;  Service: Cardiology;  Laterality: N/A;  SB, LEADLESS PPM (MICRA), MDT, ANES, EH, CVICU [id number]    INJECTION OF NEUROLYTIC AGENT AROUND LUMBAR SYMPATHETIC NERVE N/A 1/6/2022    Procedure: BLOCK, LUMBAR SYMPATHETIC;  Surgeon: Souleymane Rizo Jr., MD;  Location: [facility] PAIN MGT;  Service: Pain Management;  Laterality: N/A;  no pacemaker   pt is diabetic     INJECTION OF NEUROLYTIC AGENT AROUND LUMBAR SYMPATHETIC NERVE N/A 8/25/2022    Procedure: BLOCK, LUMBAR SYMPATHETIC;  Surgeon: Souleymane Rizo Jr., MD;  Location: [facility] PAIN MGT;  Service: Pain Management;  Laterality: N/A;  diabetic     INSERTION OF TUNNELED CENTRAL VENOUS HEMODIALYSIS CATHETER Right 1/25/2023    Procedure: INSERTION, CATHETER, HEMODIALYSIS, DUAL LUMEN;  Surgeon: Manuel Javier MD;  Location: [facility] OR;  Service: General;  Laterality: Right;     INSERTION, CATHETER, TUNNELED Left 1/28/2023    Procedure: Insertion,catheter,tunneled;  Surgeon: Watson Fontenot MD;  Location: [facility] OR;  Service: General;  Laterality: Left;    LAPAROTOMY, EXPLORATORY N/A 1/14/2023    Procedure: LAPAROTOMY, EXPLORATORY;  Surgeon: Manuel Javier MD;  Location: [facility] OR;  Service: General;  Laterality: N/A;    LAPAROTOMY, EXPLORATORY N/A 1/16/2023    Procedure: LAPAROTOMY, EXPLORATORY;  Surgeon: Manuel Javier MD;  Location: [facility] OR;  Service: General;  Laterality: N/A;    LEFT HEART CATHETERIZATION Left 2/21/2024    Procedure: Left heart cath;  Surgeon: Todd Carlisle MD;  Location: [facility] CATH LAB;  Service: Cardiology;  Laterality: Left;    LYMPHADENECTOMY Right 1/2/2024    Procedure: LYMPHADENECTOMY;  Surgeon: Tan Thomson MD;  Location: [facility] OR 2ND FLR;  Service: Cardiothoracic;  Laterality: Right;    PACEMAKER, TEMPORARY, TRANSVENOUS, PEDIATRIC Right 1/12/2024    Procedure: Pacemaker, Temporary, Transvenous;  Surgeon: Todd Carlisle MD;  Location: [facility] CATH LAB;  Service: Cardiology;  Laterality: Right;    REMOVAL OF CATHETER Right 1/28/2023    Procedure: REMOVAL, CATHETER;  Surgeon: Watson Fontenot MD;  Location: [facility] OR;  Service: General;  Laterality: Right;    REMOVAL, TUNNELED CATH Right 1/25/2023    Procedure: REMOVAL, TUNNELED CATH;  Surgeon: Manuel Javier MD;  Location: [facility] OR;  Service: General;  Laterality: Right;    ROBOTIC BRONCHOSCOPY N/A 10/20/2023    Procedure: ROBOTIC BRONCHOSCOPY;  Surgeon: Mayda Monzon MD;  Location: [facility] OR 2ND FLR;  Service: Pulmonary;  Laterality: N/A;    SHOULDER SURGERY  2009 & 2010    right rotator cuff    THORACOTOMY Right 1/2/2024    Procedure: THORACOTOMY;  Surgeon: Tan Thomson MD;  Location: [facility] OR 2ND FLR;  Service: Cardiothoracic;  Laterality: Right;    THORACOTOMY, WITH INITIAL THERAPEUTIC WEDGE RESECTION OF LUNG Right 1/2/2024    Procedure: THORACOTOMY, WITH INITIAL THERAPEUTIC  WEDGE RESECTION OF LUNG;  Surgeon: Tan Thomson MD;  Location: [facility] OR [address] [place] [address];  Service: Cardiothoracic;  Laterality: Right;    TONSILLECTOMY      UPPER GASTROINTESTINAL ENDOSCOPY  04/2018       Review of patient's allergies indicates:   Allergen Reactions    Crestor [rosuvastatin] Swelling    Gluten protein        No current facility-administered medications on file prior to encounter.     Current Outpatient Medications on File Prior to Encounter   Medication Sig    amiodarone (PACERONE) 200 MG Tab Take 1 tablet (200 mg total) by mouth 2 (two) times daily.    aspirin (ECOTRIN) 81 MG EC tablet Take 1 tablet (81 mg total) by mouth once daily.    atorvastatin (LIPITOR) 40 MG tablet Take 1 tablet (40 mg total) by mouth every evening.    B complex-vitamin C-folic acid (MELLISSA-BENJY) 0.8 mg Tab Take 1 tablet (0.8 mg total) by mouth once daily.    betahistine HCl (BETAHISTINE, BULK, MISC)     blood sugar diagnostic (TRUE METRIX GLUCOSE TEST STRIP) Strp USE ONE STRIP FOR TESTING 2 TIMES A DAY    blood-glucose meter kit Use to test twice a day    calcium-vitamin D3 (OS-CIPRIANO 500 + D3) 500 mg-5 mcg (200 unit) per tablet Take 1 tablet by mouth once daily.    carvediloL (COREG) 6.25 MG tablet Take 1 tablet (6.25 mg total) by mouth 2 (two) times daily with meals.    dicyclomine (BENTYL) 10 MG capsule Take 10 mg by mouth 3 (three) times daily.    ferrous gluconate (FERGON) 324 MG tablet Take 1 tablet (324 mg total) by mouth daily with breakfast.    insulin aspart U-100 (NOVOLOG) 100 unit/mL (3 mL) InPn pen Inject before meals:  150-200=+1, 201-250=+2, 251-300=+3; 301-350=+4, over 350=+5 units    lancets Misc 1 lancet by Misc.(Non-Drug; Combo Route) route 4 (four) times daily.    levothyroxine (SYNTHROID) 75 MCG tablet Take 75 mcg by mouth before breakfast.    loperamide (IMODIUM) 2 mg capsule Take 2 mg by mouth once as needed for Diarrhea.    meclizine (ANTIVERT) 25 mg tablet Take 1 tablet (25 mg total) by mouth 3 (three)  "times daily as needed for Dizziness.    midodrine (PROAMATINE) 10 MG tablet Take 1 tablet (10 mg total) by mouth 2 (two) times daily with meals.    nutritional supplements Liqd Take 237 mLs by mouth 4 (four) times daily.    ondansetron (ZOFRAN) 4 MG tablet Take 4 mg by mouth every 4 (four) hours as needed for Nausea.    pantoprazole (PROTONIX) 40 MG tablet Take 1 tablet (40 mg total) by mouth 2 (two) times daily before meals.    pen needle, diabetic (BD ULTRA-FINE MARIS PEN NEEDLE) 32 gauge x 5/32" Ndle 1 Device by Misc.(Non-Drug; Combo Route) route 4 (four) times daily with meals and nightly.    pregabalin (LYRICA) 75 MG capsule Take 1 capsule (75 mg total) by mouth every other day. Give after HD    sevelamer HCL (RENAGEL) 800 MG Tab Take 2 tablets (1,600 mg total) by mouth 3 (three) times daily with meals.    torsemide (DEMADEX) 20 MG Tab Take 20 mg by mouth 2 (two) times daily.    valsartan (DIOVAN) 40 MG tablet Take 0.5 tablets (20 mg total) by mouth 2 (two) times daily.    venlafaxine (EFFEXOR) 37.5 MG Tab Take 1 tablet (37.5 mg total) by mouth once daily.    vit C,K-To-zxdnh-lutein-zeaxan (PRESERVISION AREDS 2) 352-020-21-1 mg-unit-mg-mg Cap     [DISCONTINUED] carvediloL (COREG) 6.25 MG tablet Take 1 tablet (6.25 mg total) by mouth 2 (two) times daily with meals.    [DISCONTINUED] insulin glargine 100 units/mL SubQ pen Inject 3 mLs into the skin every evening. PRN    [DISCONTINUED] metoprolol tartrate (LOPRESSOR) 25 MG tablet Take 0.5 tablets (12.5 mg total) by mouth 2 (two) times daily.    [DISCONTINUED] patiromer calcium sorbitex (VELTASSA) 8.4 gram PwPk Take 2 packets (16.8 g total) by mouth once daily.     Family History       Problem Relation (Age of Onset)    Arthritis Father    Breast cancer Mother, Paternal Aunt    Cancer Mother, Paternal Aunt    Diabetes Paternal Grandfather    Hyperlipidemia Sister    Vision loss Father, Mother, Sister          Tobacco Use    Smoking status: Never    Smokeless " tobacco: Never   Substance and Sexual Activity    Alcohol use: No    Drug use: Never    Sexual activity: Not Currently     Partners: Male     Birth control/protection: See Surgical Hx     Comment: :      Review of Systems   Constitutional:  Positive for appetite change and fatigue. Negative for chills and fever.   HENT:  Negative for sore throat and trouble swallowing.    Eyes:  Negative for photophobia and visual disturbance.   Respiratory:  Positive for cough. Negative for shortness of breath and wheezing.    Cardiovascular:  Negative for chest pain, palpitations and leg swelling.   Gastrointestinal:  Negative for abdominal distention, abdominal pain, diarrhea, nausea and vomiting.   Genitourinary:  Negative for dysuria and hematuria.   Musculoskeletal:  Negative for neck pain and neck stiffness.   Skin:  Negative for rash and wound.   Neurological:  Positive for weakness. Negative for seizures, syncope, light-headedness, numbness and headaches.   Psychiatric/Behavioral:  Negative for confusion and decreased concentration.      Objective:     Vital Signs (Most Recent):  Temp: 97.7 °F (36.5 °C) (03/26/24 1429)  Pulse: 77 (03/26/24 1659)  Resp: 16 (03/26/24 1659)  BP: 123/61 (03/26/24 1659)  SpO2: 100 % (03/26/24 1659) Vital Signs (24h Range):  Temp:  [97.7 °F (36.5 °C)] 97.7 °F (36.5 °C)  Pulse:  [72-77] 77  Resp:  [12-20] 16  SpO2:  [100 %] 100 %  BP: (123-213)/() 123/61     Weight: 44 kg (97 lb)  Body mass index is 16.14 kg/m².     Physical Exam  Constitutional:       General: She is not in acute distress.     Appearance: She is not toxic-appearing or diaphoretic.      Comments: Chronically ill appearing, frail, underweight   HENT:      Head: Normocephalic and atraumatic.      Nose: Nose normal.      Mouth/Throat:      Mouth: Mucous membranes are dry.   Eyes:      General: No scleral icterus.     Extraocular Movements: Extraocular movements intact.      Pupils: Pupils are equal, round, and reactive  to light.   Cardiovascular:      Rate and Rhythm: Normal rate. Rhythm irregular.   Pulmonary:      Effort: Pulmonary effort is normal. No respiratory distress.      Breath sounds: Rales present. No wheezing.   Abdominal:      General: Abdomen is flat. There is no distension.      Palpations: Abdomen is soft.      Tenderness: There is no abdominal tenderness. There is no guarding.   Musculoskeletal:         General: Normal range of motion.      Cervical back: Normal range of motion and neck supple. No rigidity.      Right lower leg: Edema present.      Left lower leg: Edema present.   Skin:     General: Skin is warm and dry.   Neurological:      General: No focal deficit present.      Mental Status: She is alert and oriented to person, place, and time.   Psychiatric:         Mood and Affect: Mood normal.         Behavior: Behavior normal.              CRANIAL NERVES     CN III, IV, VI   Pupils are equal, round, and reactive to light.       Significant Labs: All pertinent labs within the past 24 hours have been reviewed.  CBC:   Recent Labs   Lab 03/26/24  1537 03/26/24  1546   WBC 11.09  --    HGB 7.2*  --    HCT 23.7* 40     --      CMP:   Recent Labs   Lab 03/26/24  1230 03/26/24  1537    136   K 2.7* 2.8*   CL 92* 92*   CO2 32* 34*   * 139*   BUN 19 20   CREATININE 2.2* 2.4*   CALCIUM 9.0 8.9   PROT 7.3 6.8   ALBUMIN 2.0* 2.1*   BILITOT 0.3 0.3   ALKPHOS 94 98   AST 21 22   ALT 12 12   ANIONGAP 13 10       Significant Imaging: I have reviewed all pertinent imaging results/findings within the past 24 hours.  Assessment/Plan:     * Hypokalemia  - suspect multifactorial in setting of poor po intake/nutritioin, CHF on torsemide, and also ESRD on HD MWF  - K+2.8 on presentation  - potassium bicarb po 25meq x1  - holding home torsemide at this time  - monitor tele  - repeat labs in am  - Nephrology consulted  ;  appreciate recs  - further management pending clinical course and future study  review    Hypothyroid  - TSH 19.9  - increased home synthroid from 75mcg to 88mcg  - repeat TSH in 6wks      Chronic combined systolic and diastolic heart failure  - holding home torsemide at this time while correcting hypokalemia  - monitor tele  - strict I/Os  - daily weights  - low sodium fluid restricted renal gluten free diet  - continue to closely monitor       Moderate malnutrition  - novasource renal TID    Atrial fibrillation  - continue home amiodarone, coreg  - not on anticoagulation due to hx of GIB    ESRD (end stage renal disease)  - Nephrology consulted for HD management    Malignant carcinoid tumor of bronchus  - sp resection      Primary hypertension  - continue home coreg, valsartan    Generalized anxiety disorder  - continue home venlafaxine      Meniere's disease of both ears  - home meclizine prn      Celiac disease  - gluten free renal diet        VTE Risk Mitigation (From admission, onward)           Ordered     IP VTE HIGH RISK PATIENT  Once         03/26/24 1747     Place sequential compression device  Until discontinued         03/26/24 1747                       On 03/26/2024, patient should be placed in hospital observation services under my care.             Mandeep Hess MD  Department of Hospital Medicine  [facility] - Emergency Dept

## 2024-03-26 NOTE — PROGRESS NOTES
Advanced Heart Failure and Transplantation Clinic Follow up.      Attending Physician: Deion Thibodeaux MD.  The patient's last visit with me was on Visit date not found.         HPI.  Lily Green is a 73 year old white woman with celiac disease, diabetes mellitus type 2 with polyneuropathy, hypertension, hyperlipidemia, history of peptic ulcer disease, depression, anxiety, history of hysterectomy in 1987, history of peritonitis due to diffuse colonic ischemia status post total colectomy and end ileostomy creation on 1/14/2023, evacuation of intraabdominal hematoma, takedown of left lateral ileostomy and recreation of right lateral end ileostomy on 1/16/2023, elective robotic ileostomy reversal and ileocolonic anastomosis on 8/7/2023, irritable bowel syndrome, end stage renal disease status post right internal jugular permanent dialysis catheter on 1/25/2023 removed on 1/28/2023 and replaced with left internal jugular permanent dialysis catheter (on hemodialysis Tuesday Thursday Saturday), right middle lobe carcinoid tumor status post robotic-assisted converted to right thoracotomy with therapeutic wedge resection and mediastinal lymph node dissection and repair of bleeding right inferior pulmonary vein on 1/2/2024, atrial fibrillation, [employer]onic Micra AV leadless cardiac pacemaker placement on 1/16/2024. She lives in [place], [place]. She is . Her primary care physician is Dr. Pavel Calixto.               She was hospitalized at [facility] from 1/2/2024 to 2/6/2024 for right middle lobe carcinoid tumor, parapneumonic effusion, atrial fibrillation with rapid ventricular response, tachy-eulogio syndrome, gastrointestinal bleeding while on heparin. She was discharged to [facility] skilled nursing facility. She was prescribed cefpodoxime and metronidazole for 13 days, fluconazole for 7  days, metoprolol tartrate, and venlafaxine.               She presented to [facility] Emergency Department on 2/12/2024 from the skilled nursing facility with increased work of breathing, dyspnea, and hypoxia with oxygen saturation of 70% on 2 liters/minute of supplemental oxygen. It occurred acutely on the day of presentation. Emergency medical services put her on CPAP with some improvement. She was put on BiPAP in the emergency department. She had tachycardia and tachypnea. Her last dialysis session was 2 days prior on Saturday. She reported cough and nausea. Labs showed BNP 2754 pg/mL, troponin 0.312 ng/mL. Venous blood gas showed pH 7.438, pCO2 38.9. EKG showed sinus tachycardia. Chest X-ray showed pleural effusions and possible multifocal pneumonia. Nephrology was consulted for emergent dialysis with goal of removing 3 liters. She was admitted to Critical Care Medicine.    Echocardiogram revealed new low left ventricular ejection fraction. Troponins remained elevated. Cardiology was consulted. She developed atrial fibrillation with rapid ventricular response on 2/13/2024. She was started on amiodarone infusion. She underwent slow low-efficiency dialysis (SLED). CT showed fluid collections in the right paraesophageal space and azygoesophageal recess. Cardiothoracic Surgery was consulted and recommended Interventional Radiology consult for drainage, but there was no safe window for approach. Interventional Cardiology deferred angiography due to respiratory failure. Palliative Care was consulted. Dialysis removed further fluid. Metoprolol was increased. Thoracentesis was performed on 2/15/2024 removing 1.45 liters, which was sent for analysis. She developed re-expansion pulmonary edema and was started on Airvo high flow oxygen. Airvo was weaned down to 50 liters/minute and FiO2 40% on 2/16/2024. Pleural effusion was transudative. She was started on vasopressor support. She was weaned to  high flow nasal cannula at 12 liters/minute on 2/17/2024. She tolerated SLED without norepinephrine. She was weaned to 4 liters/minute on 2/18/2024. She was transferred to Hospital Medicine Team W on 2/20/2024. She was started on nifedipine. She worked with Physical and Occupational Therapy. Interventional Cardiology was reconsulted. Left heart catheterization showed luminal irregularities. Interventional Cardiology recommended starting aspirin 81 mg daily and atorvastatin 10 mg daily and consulting Heart Transplant Service (HTS) for management of nonischemic cardiomyopathy. She was already on atorvastatin 40 mg daily. Palliative Care signed off and planned to see her outpatient. She continued to require 4 liters/minute of supplemental oxygen. She was still short of breath. HTS was consulted and they said General Cardiology could be consulted. Cardiology recommended stopping nifedipine, starting valsartan, and resuming her metoprolol. Nasal cannula was weaned to 2 liters/minute on 2/25/2024 but she started coughing. Repeat chest X-ray on 2/26/2024 paradoxically showed slight increase in left pleural effusion, as well as persistent mild pulmonary vascular congestion. Interventional Radiology was consulted for thoracentesis. They removed 725 mL of pleural fluid on 2/27/2024. Fluid was sent for cultures. She continued to feel short of breath with tachypnea on 2/28/2024. Net fluid status since admission was negative 13.8 liters at this point. Hypoxia resolved but she was coughing more. Chest CTA showed a new left lower lobe pneumonia. She was put on a course of doxycycline. Pharmacy recommended adding cefpodoxime. She was discharged to the skilled nursing facility on 3/1/2024.     March 26, 2024: patient has dyspnea on exertion, dyspnea with minimal activities. She came in a wheelchair with her . She was on rehab and said she was able to walk 150 steps.  She prefers sleeping elevated. She complained of leg edema.  She is on HD -W-.    Review of Systems   Constitutional:  Negative for chills, diaphoresis and fever.   HENT:  Negative for nasal congestion, rhinorrhea and sore throat.    Eyes:  Negative for visual disturbance.   Respiratory:  Positive for shortness of breath. Negative for cough, choking, chest tightness, wheezing and stridor.    Cardiovascular:  Negative for chest pain.   Gastrointestinal:  Negative for abdominal pain, diarrhea, nausea and vomiting.   Genitourinary:  Negative for difficulty urinating, dysuria and hematuria.   Integumentary:  Negative for rash.   Neurological:  Negative for seizures, syncope and light-headedness.   Psychiatric/Behavioral:  Negative for agitation and hallucinations.         Past Medical History:   Diagnosis Date    Anxiety     Celiac disease 2018    Celiac disease     Depression     Diabetes mellitus     Family history of breast cancer in mother      at age 68    Hyperlipidemia     Hypertension     Meniere disease     Meniere's disease, unspecified ear     Menopause     Peptic ulcer     Peritonitis 2023    Reflux esophagitis     Urinary tract infection     Vaginal infection     Vaginal Pap smear 2014    Pap/Hpv Negative         Past Surgical History:   Procedure Laterality Date    APPENDECTOMY      BREAST SURGERY      Tags    CARPAL TUNNEL RELEASE Bilateral 2017    ,     CLOSURE, COLOSTOMY Left 2023    Procedure: CLOSURE, COLOSTOMY;  Surgeon: Manuel Javier MD;  Location: [facility] OR;  Service: General;  Laterality: Left;    COLONOSCOPY  2018    Normal  (Cornell Velazco)     COLOSTOMY Right 2023    Procedure: CREATION, COLOSTOMY;  Surgeon: Manuel Javier MD;  Location: [facility] OR;  Service: General;  Laterality: Right;    CORONARY ANGIOGRAPHY N/A 2024    Procedure: ANGIOGRAM, CORONARY ARTERY;  Surgeon: Todd Carlisle MD;  Location: [facility] CATH LAB;  Service: Cardiology;  Laterality: N/A;    DILATION AND CURETTAGE OF UTERUS       DUPUYTREN CONTRACTURE RELEASE Bilateral 09/2017    ESOPHAGOGASTRODUODENOSCOPY N/A 1/24/2024    Procedure: EGD (ESOPHAGOGASTRODUODENOSCOPY);  Surgeon: Yamilet Walters MD;  Location: [facility] ENDO (2ND FLR);  Service: Gastroenterology;  Laterality: N/A;    ESOPHAGOGASTRODUODENOSCOPY N/A 1/26/2024    Procedure: EGD (ESOPHAGOGASTRODUODENOSCOPY);  Surgeon: Yamilet Walters MD;  Location: [facility] ENDO (2ND FLR);  Service: Gastroenterology;  Laterality: N/A;    HYSTERECTOMY  1987    BEAU w/ appy, no BSO     IMPLANTATION OF LEADLESS PACEMAKER N/A 1/16/2024    Procedure: INSERTION, CARDIAC PACEMAKER, LEADLESS;  Surgeon: LENIN Frances MD;  Location: [facility] EP LAB;  Service: Cardiology;  Laterality: N/A;  SB, LEADLESS PPM (MICRA), MDT, ANES, EH, CVICU [id number]    INJECTION OF NEUROLYTIC AGENT AROUND LUMBAR SYMPATHETIC NERVE N/A 1/6/2022    Procedure: BLOCK, LUMBAR SYMPATHETIC;  Surgeon: Souleymane Rizo Jr., MD;  Location: [facility] PAIN MGT;  Service: Pain Management;  Laterality: N/A;  no pacemaker   pt is diabetic     INJECTION OF NEUROLYTIC AGENT AROUND LUMBAR SYMPATHETIC NERVE N/A 8/25/2022    Procedure: BLOCK, LUMBAR SYMPATHETIC;  Surgeon: Souleymane Rizo Jr., MD;  Location: [facility] PAIN MGT;  Service: Pain Management;  Laterality: N/A;  diabetic     INSERTION OF TUNNELED CENTRAL VENOUS HEMODIALYSIS CATHETER Right 1/25/2023    Procedure: INSERTION, CATHETER, HEMODIALYSIS, DUAL LUMEN;  Surgeon: Manuel Javier MD;  Location: [facility] OR;  Service: General;  Laterality: Right;    INSERTION, CATHETER, TUNNELED Left 1/28/2023    Procedure: Insertion,catheter,tunneled;  Surgeon: Watson Fontenot MD;  Location: [facility] OR;  Service: General;  Laterality: Left;    LAPAROTOMY, EXPLORATORY N/A 1/14/2023    Procedure: LAPAROTOMY, EXPLORATORY;  Surgeon: Manuel Javier MD;  Location: [facility] OR;  Service: General;  Laterality: N/A;    LAPAROTOMY, EXPLORATORY N/A 1/16/2023    Procedure: LAPAROTOMY, EXPLORATORY;  Surgeon: Manuel Javier MD;   Location: [facility] OR;  Service: General;  Laterality: N/A;    LEFT HEART CATHETERIZATION Left 2/21/2024    Procedure: Left heart cath;  Surgeon: Todd Carlisle MD;  Location: [facility] CATH LAB;  Service: Cardiology;  Laterality: Left;    LYMPHADENECTOMY Right 1/2/2024    Procedure: LYMPHADENECTOMY;  Surgeon: Tan Thomson MD;  [facility];  Service: Cardiothoracic;  Laterality: Right;    PACEMAKER, TEMPORARY, TRANSVENOUS, PEDIATRIC Right 1/12/2024    Procedure: Pacemaker, Temporary, Transvenous;  Surgeon: Todd Carlisle MD;  Location: [facility] CATH LAB;  Service: Cardiology;  Laterality: Right;    REMOVAL OF CATHETER Right 1/28/2023    Procedure: REMOVAL, CATHETER;  Surgeon: Watson Fontenot MD;  Location: [facility] OR;  Service: General;  Laterality: Right;    REMOVAL, TUNNELED CATH Right 1/25/2023    Procedure: REMOVAL, TUNNELED CATH;  Surgeon: Manuel Javier MD;  Location: [facility] OR;  Service: General;  Laterality: Right;    ROBOTIC BRONCHOSCOPY N/A 10/20/2023    Procedure: ROBOTIC BRONCHOSCOPY;  Surgeon: Mayda Monzon MD;  [facility];  Service: Pulmonary;  Laterality: N/A;    SHOULDER SURGERY  2009 & 2010    right rotator cuff    THORACOTOMY Right 1/2/2024    Procedure: THORACOTOMY;  Surgeon: Tan Thomson MD;  [address]  Service: Cardiothoracic;  Laterality: Right;    THORACOTOMY, WITH INITIAL THERAPEUTIC WEDGE RESECTION OF LUNG Right 1/2/2024    Procedure: THORACOTOMY, WITH INITIAL THERAPEUTIC WEDGE RESECTION OF LUNG;  Surgeon: Tan Thomson MD;  [facility];  Service: Cardiothoracic;  Laterality: Right;    TONSILLECTOMY      UPPER GASTROINTESTINAL ENDOSCOPY  04/2018        Family History   Problem Relation Age of Onset    Vision loss Father     Arthritis Father     Breast cancer Mother     Cancer Mother     Vision loss Mother     Hyperlipidemia Sister     Breast cancer Paternal Aunt     Cancer Paternal Aunt     Diabetes Paternal  "Grandfather     Vision loss Sister     Kidney disease Neg Hx         Review of patient's allergies indicates:   Allergen Reactions    Crestor [rosuvastatin] Swelling    Gluten protein         Current Outpatient Medications   Medication Instructions    amiodarone (PACERONE) 200 mg, Oral, 2 times daily    aspirin (ECOTRIN) 81 mg, Oral, Daily    atorvastatin (LIPITOR) 40 mg, Oral, Nightly    B complex-vitamin C-folic acid (MELLISSA-BENJY) 0.8 mg Tab 0.8 mg, Oral, Daily    betahistine HCl (BETAHISTINE, BULK, MISC) No dose, route, or frequency recorded.    blood sugar diagnostic (TRUE METRIX GLUCOSE TEST STRIP) Strp USE ONE STRIP FOR TESTING 2 TIMES A DAY    blood-glucose meter kit Use to test twice a day    calcium-vitamin D3 (OS-CIPRIANO 500 + D3) 500 mg-5 mcg (200 unit) per tablet 1 tablet, Oral, Daily    ferrous gluconate (FERGON) 324 mg, Oral, With breakfast    insulin aspart U-100 (NOVOLOG) 100 unit/mL (3 mL) InPn pen Inject before meals:  150-200=+1, 201-250=+2, 251-300=+3; 301-350=+4, over 350=+5 units    lancets Misc 1 lancet , [facility].(Non-Drug; Combo Route), 4 times daily    meclizine (ANTIVERT) 25 mg, Oral, 3 times daily PRN    metoprolol tartrate (LOPRESSOR) 12.5 mg, Oral, 2 times daily    midodrine (PROAMATINE) 10 mg, Oral, 2 times daily with meals    nutritional supplements Liqd 237 mLs, Oral, 4 times daily    pantoprazole (PROTONIX) 40 mg, Oral, 2 times daily before meals    patiromer calcium sorbitex (VELTASSA) 16.8 g, Oral, Daily    pen needle, diabetic (BD ULTRA-FINE MARIS PEN NEEDLE) 32 gauge x 5/32" Ndle 1 Device, Misc.(Non-Drug; Combo Route), 4 times daily with meals & nightly    pregabalin (LYRICA) 75 mg, Oral, Every other day, Give after HD    sevelamer HCL (RENAGEL) 1,600 mg, Oral, 3 times daily with meals    torsemide (DEMADEX) 20 mg, Oral, 2 times daily    valsartan (DIOVAN) 20 mg, Oral, 2 times daily    venlafaxine (EFFEXOR) 37.5 mg, Oral, Daily    vit C,P-Pv-tmkwr-lutein-zeaxan (PRESERVISION AREDS 2) " "436-443-32-1 mg-unit-mg-mg Cap No dose, route, or frequency recorded.        There were no vitals filed for this visit.     Wt Readings from Last 3 Encounters:   02/27/24 67.6 kg (149 lb 0.5 oz)   01/25/24 67.6 kg (149 lb)   12/20/23 60.2 kg (132 lb 11.5 oz)     Temp Readings from Last 3 Encounters:   03/01/24 98.1 °F (36.7 °C) (Oral)   02/06/24 98.1 °F (36.7 °C)   11/27/23 97.2 °F (36.2 °C)     BP Readings from Last 3 Encounters:   03/01/24 (!) 159/56   02/06/24 (!) 120/59   12/20/23 114/69     Pulse Readings from Last 3 Encounters:   03/01/24 101   02/06/24 85   02/05/24 88        There is no height or weight on file to calculate BMI. Estimated body surface area is 1.76 meters squared as calculated from the following:    Height as of 2/27/24: 5' 5" (1.651 m).    Weight as of 2/27/24: 67.6 kg (149 lb 0.5 oz).     Physical Exam  Constitutional:       Appearance: She is well-developed.   HENT:      Head: Normocephalic and atraumatic.      Right Ear: External ear normal.      Left Ear: External ear normal.   Eyes:      Conjunctiva/sclera: Conjunctivae normal.      Pupils: Pupils are equal, round, and reactive to light.   Neck:      Vascular: Hepatojugular reflux and JVD present.      Comments: JVP 16 cmH20  Cardiovascular:      Rate and Rhythm: Normal rate and regular rhythm.      Pulses: Intact distal pulses.      Heart sounds: S1 normal and S2 normal. No murmur heard.     No friction rub. No gallop.   Pulmonary:      Effort: Pulmonary effort is normal.      Breath sounds: Normal breath sounds.   Abdominal:      General: Bowel sounds are normal. There is no distension.      Palpations: Abdomen is soft.      Tenderness: There is no abdominal tenderness. There is no guarding or rebound.   Musculoskeletal:      Cervical back: Normal range of motion and neck supple.      Right lower leg: 3+ Edema present.      Left lower leg: 3+ Edema present.   Neurological:      Mental Status: She is alert and oriented to person, " place, and time.          Lab Results   Component Value Date    BNP 2,754 (H) 02/12/2024     (L) 03/01/2024    K 4.1 03/01/2024    MG 2.1 03/01/2024    CL 95 03/01/2024    CO2 21 (L) 03/01/2024    BUN 72 (H) 03/01/2024    CREATININE 3.1 (H) 03/01/2024     (H) 03/01/2024    HGBA1C 4.9 11/15/2023    HGBA1C 6.8 10/23/2019    AST 17 03/01/2024    ALT 6 (L) 03/01/2024    ALBUMIN 1.9 (L) 03/01/2024    PROT 7.0 03/01/2024    BILITOT 0.3 03/01/2024    WBC 18.17 (H) 03/01/2024    HGB 8.4 (L) 03/01/2024    HCT 27.5 (L) 03/01/2024    HCT 27 (L) 01/12/2024     03/01/2024    INR 1.3 (H) 01/23/2024     (H) 02/15/2024    TSH 18.183 (H) 02/12/2024    CHOL 125 02/16/2024    HDL 39 (L) 02/16/2024    HDL 53 10/23/2019    LDLCALC 60.4 (L) 02/16/2024    TRIG 128 02/16/2024    TRIG 102 10/23/2019    FREET4 0.88 02/12/2024       Results for orders placed during the hospital encounter of 02/12/24    Echo    Interpretation Summary    Left Ventricle: The left ventricle is normal in size. Normal wall thickness. Normal wall motion. There is severely reduced systolic function with a visually estimated ejection fraction of 20 - 25%. There is diastolic dysfunction.    Right Ventricle: Normal right ventricular cavity size. Wall thickness is normal. Right ventricle wall motion has segmental dysfunction with akinesis of the mid to distal free wall. Systolic function is moderately reduced.    Left Atrium: Left atrium is moderately dilated.    Aortic Valve: Aortic valve thickening. There is moderate aortic valve sclerosis. There is annular calcification present.    IVC/SVC: Normal venous pressure at 3 mmHg.    Pericardium: Left pleural effusion.        Results for orders placed during the hospital encounter of 02/12/24    Cardiac catheterization    Conclusion    Non-obstructive CAD    Non-ischemic cardiomyopathy    The estimated blood loss was <50 mL.    The procedure log was documented by Documenter: Tori Corral and  verified by Todd Carlisle MD.    Date: 2/21/2024  Time: 3:22 PM         Assessment and Plan:  Acute decompensated heart failure  -     Ambulatory referral/consult to Adult Advanced Heart Failure          Chronic systolic HF, NYHA class III, stage C. LVEF 20-25%  Etiology: NICM, suspected tachycardia induced. Has atrial fibrillation and previous HFrEF that had recovered normal function in 2023. Back down to 25% in the setting of a. Fib with RVR last February 2024.  Devices: leadless pacemaker.  Medications: metoprolol tartrate. Valsartan 20 mg BID.  Hemodynamic status: warm, hypertensive, hypervolemic.  Clinical course:  low EF since 2023.   Plan:  -Stop metoprolol tartrate.  -Start carvedilol 6.25 mg BID.  -F/u in 2 months with labs.    2. Atrial fibrillation and pacemaker.  Referred to EP for treatment.    3. Hypokalemia. Critical value 2.7. Patient is being sent to ER for fast correction.    4. H/o lung cancer (carcinoid) s/p resection in 2024.    5. ESRD on HD.    Patient is not a candidate for advanced therapies.

## 2024-03-26 NOTE — ED NOTES
I-STAT Chem-8+ Results:   Value Reference Range   Sodium 135 136-145 mmol/L   Potassium  2.5 3.5-5.1 mmol/L   Chloride 88  mmol/L   Ionized Calcium 1.21 1.06-1.42 mmol/L   CO2 (measured) 35 23-29 mmol/L   Glucose 146  mg/dL   BUN 21 6-30 mg/dL   Creatinine 2.5 0.5-1.4 mg/dL   Hematocrit 40 36-54%

## 2024-03-26 NOTE — ED PROVIDER NOTES
Encounter Date: 3/26/2024       History     Chief Complaint   Patient presents with    Abnormal Lab     Pt sent down from clinic for hypokalemia.  K+ 2.7     73-year-old female with past medical history of DM T2, celiac disease, hypertension, Meniere's disease, hyperlipidemia, reflux esophagitis, atrial fibrillation, ESRD, CHF who was referred to the ED for abnormal labs.  Was at a cardiology appointment to establish care had outpatient labs showed hypokalemia with a potassium 2.7.  Patient is compliant with her torsemide.  Denies any chest pain, abdominal pain, diarrhea or nausea vomiting.  Is on 2 L nasal cannula denies any increased oxygen requirement.    The history is provided by the patient.     Review of patient's allergies indicates:   Allergen Reactions    Crestor [rosuvastatin] Swelling    Gluten protein      Past Medical History:   Diagnosis Date    Anxiety     Celiac disease 2018    Celiac disease     Depression     Diabetes mellitus     Family history of breast cancer in mother      at age 68    Hyperlipidemia     Hypertension     Meniere disease     Meniere's disease, unspecified ear     Menopause     Peptic ulcer     Peritonitis 2023    Reflux esophagitis     Urinary tract infection     Vaginal infection     Vaginal Pap smear 2014    Pap/Hpv Negative      Past Surgical History:   Procedure Laterality Date    APPENDECTOMY      BREAST SURGERY      Tags    CARPAL TUNNEL RELEASE Bilateral 2017    ,     CLOSURE, COLOSTOMY Left 2023    Procedure: CLOSURE, COLOSTOMY;  Surgeon: Manuel Javier MD;  Location: [facility] OR;  Service: General;  Laterality: Left;    COLONOSCOPY  2018    Normal  ( Juan A)     COLOSTOMY Right 2023    Procedure: CREATION, COLOSTOMY;  Surgeon: Manuel Javier MD;  Location: [facility] OR;  Service: General;  Laterality: Right;    CORONARY ANGIOGRAPHY N/A 2024    Procedure: ANGIOGRAM, CORONARY ARTERY;  Surgeon: Todd Carlisle MD;   Location: [facility] CATH LAB;  Service: Cardiology;  Laterality: N/A;    DILATION AND CURETTAGE OF UTERUS  1986    DUPUYTREN CONTRACTURE RELEASE Bilateral 09/2017    ESOPHAGOGASTRODUODENOSCOPY N/A 1/24/2024    Procedure: EGD (ESOPHAGOGASTRODUODENOSCOPY);  Surgeon: Yamilet Walters MD;  Location: [facility] ENDO (2ND FLR);  Service: Gastroenterology;  Laterality: N/A;    ESOPHAGOGASTRODUODENOSCOPY N/A 1/26/2024    Procedure: EGD (ESOPHAGOGASTRODUODENOSCOPY);  Surgeon: Yamilet Walters MD;  Location: [facility] ENDO (2ND FLR);  Service: Gastroenterology;  Laterality: N/A;    HYSTERECTOMY  1987    BEAU w/ appy, no BSO     IMPLANTATION OF LEADLESS PACEMAKER N/A 1/16/2024    Procedure: INSERTION, CARDIAC PACEMAKER, LEADLESS;  Surgeon: LENIN Frances MD;  Location: [facility] EP LAB;  Service: Cardiology;  Laterality: N/A;  SB, LEADLESS PPM (MICRA), MDT, ANES, EH, CVICU [id number]    INJECTION OF NEUROLYTIC AGENT AROUND LUMBAR SYMPATHETIC NERVE N/A 1/6/2022    Procedure: BLOCK, LUMBAR SYMPATHETIC;  Surgeon: Souleymane Rizo Jr., MD;  Location: [facility] PAIN MGT;  Service: Pain Management;  Laterality: N/A;  no pacemaker   pt is diabetic     INJECTION OF NEUROLYTIC AGENT AROUND LUMBAR SYMPATHETIC NERVE N/A 8/25/2022    Procedure: BLOCK, LUMBAR SYMPATHETIC;  Surgeon: Souleymane Rizo Jr., MD;  Location: [facility] PAIN MGT;  Service: Pain Management;  Laterality: N/A;  diabetic     INSERTION OF TUNNELED CENTRAL VENOUS HEMODIALYSIS CATHETER Right 1/25/2023    Procedure: INSERTION, CATHETER, HEMODIALYSIS, DUAL LUMEN;  Surgeon: Manuel Javier MD;  Location: [facility] OR;  Service: General;  Laterality: Right;    INSERTION, CATHETER, TUNNELED Left 1/28/2023    Procedure: Insertion,catheter,tunneled;  Surgeon: Watson Fontenot MD;  Location: [facility] OR;  Service: General;  Laterality: Left;    LAPAROTOMY, EXPLORATORY N/A 1/14/2023    Procedure: LAPAROTOMY, EXPLORATORY;  Surgeon: Manuel Javier MD;  Location: [facility] OR;  Service: General;  Laterality: N/A;     LAPAROTOMY, EXPLORATORY N/A 1/16/2023    Procedure: LAPAROTOMY, EXPLORATORY;  Surgeon: Manuel Javier MD;  Location: [facility] OR;  Service: General;  Laterality: N/A;    LEFT HEART CATHETERIZATION Left 2/21/2024    Procedure: Left heart cath;  Surgeon: Todd Carlisle MD;  Location: [facility] CATH LAB;  Service: Cardiology;  Laterality: Left;    LYMPHADENECTOMY Right 1/2/2024    Procedure: LYMPHADENECTOMY;  Surgeon: Tan Thomson MD;  [address]  Service: Cardiothoracic;  Laterality: Right;    PACEMAKER, TEMPORARY, TRANSVENOUS, PEDIATRIC Right 1/12/2024    Procedure: Pacemaker, Temporary, Transvenous;  Surgeon: Todd Carlisle MD;  Location: [facility] CATH LAB;  Service: Cardiology;  Laterality: Right;    REMOVAL OF CATHETER Right 1/28/2023    Procedure: REMOVAL, CATHETER;  Surgeon: Watson Fontenot MD;  Location: [facility] OR;  Service: General;  Laterality: Right;    REMOVAL, TUNNELED CATH Right 1/25/2023    Procedure: REMOVAL, TUNNELED CATH;  Surgeon: Manuel Javier MD;  Location: [facility] OR;  Service: General;  Laterality: Right;    ROBOTIC BRONCHOSCOPY N/A 10/20/2023    Procedure: ROBOTIC BRONCHOSCOPY;  Surgeon: Mayda Monzon MD;  Location: [address];  Service: Pulmonary;  Laterality: N/A;    SHOULDER SURGERY  2009 & 2010    right rotator cuff    THORACOTOMY Right 1/2/2024    Procedure: THORACOTOMY;  Surgeon: Tan Thomson MD;  [facility];  Service: Cardiothoracic;  Laterality: Right;    THORACOTOMY, WITH INITIAL THERAPEUTIC WEDGE RESECTION OF LUNG Right 1/2/2024    Procedure: THORACOTOMY, WITH INITIAL THERAPEUTIC WEDGE RESECTION OF LUNG;  Surgeon: Tan Thomson MD;  [facility];  Service: Cardiothoracic;  Laterality: Right;    TONSILLECTOMY      UPPER GASTROINTESTINAL ENDOSCOPY  04/2018     Family History   Problem Relation Age of Onset    Vision loss Father     Arthritis Father     Breast cancer Mother     Cancer Mother     Vision loss Mother      Hyperlipidemia Sister     Breast cancer Paternal Aunt     Cancer Paternal Aunt     Diabetes Paternal Grandfather     Vision loss Sister     Kidney disease Neg Hx      Social History     Tobacco Use    Smoking status: Never    Smokeless tobacco: Never   Substance Use Topics    Alcohol use: No    Drug use: Never     Review of Systems   Constitutional:  Negative for chills and fever.   HENT:  Negative for sore throat.    Respiratory:  Positive for shortness of breath.    Gastrointestinal:  Negative for abdominal pain.   Genitourinary:  Negative for difficulty urinating and dysuria.   Musculoskeletal: Negative.    Neurological:  Negative for weakness.   Psychiatric/Behavioral:  Negative for confusion.        Physical Exam     Initial Vitals [03/26/24 1429]   BP Pulse Resp Temp SpO2   (!) 213/107 74 20 97.7 °F (36.5 °C) 100 %      MAP       --         Physical Exam    Nursing note and vitals reviewed.  Constitutional: She appears well-developed and well-nourished.   HENT:   Head: Normocephalic and atraumatic.   Eyes: Conjunctivae are normal.   Neck: Neck supple.   Normal range of motion.  Cardiovascular:  Normal rate.           Pulmonary/Chest: Breath sounds normal.   Abdominal: Abdomen is soft. There is no abdominal tenderness.   Musculoskeletal:         General: Normal range of motion.      Cervical back: Normal range of motion and neck supple.     Neurological: She is alert and oriented to person, place, and time. GCS score is 15. GCS eye subscore is 4. GCS verbal subscore is 5. GCS motor subscore is 6.   Skin: Skin is warm and dry. Capillary refill takes less than 2 seconds.         ED Course   Procedures  Labs Reviewed   CBC W/ AUTO DIFFERENTIAL - Abnormal; Notable for the following components:       Result Value    RBC 2.44 (*)     Hemoglobin 7.2 (*)     Hematocrit 23.7 (*)     MCHC 30.4 (*)     RDW 18.8 (*)     Immature Granulocytes 1.6 (*)     Gran # (ANC) 8.7 (*)     Immature Grans (Abs) 0.18 (*)     Lymph #  0.6 (*)     Mono # 1.6 (*)     Gran % 78.3 (*)     Lymph % 5.0 (*)     All other components within normal limits   COMPREHENSIVE METABOLIC PANEL - Abnormal; Notable for the following components:    Potassium 2.8 (*)     Chloride 92 (*)     CO2 34 (*)     Glucose 139 (*)     Creatinine 2.4 (*)     Albumin 2.1 (*)     eGFR 20.8 (*)     All other components within normal limits   ISTAT PROCEDURE - Abnormal; Notable for the following components:    POC Glucose 146 (*)     POC Creatinine 2.5 (*)     POC Sodium 135 (*)     POC Potassium 2.5 (*)     POC Chloride 88 (*)     POC TCO2 (MEASURED) 35 (*)     All other components within normal limits   MAGNESIUM   ISTAT CHEM8          Imaging Results    None          Medications - No data to display  Medical Decision Making  73-year-old female with past medical history of ESRD, CHF presents ED for hypokalemia.      Differential includes but not limited to hypokalemia, hypomagnesemia, electrolyte derangement, dysrhythmia    Patient denies any symptoms.  She was on torsemide b.i.d. which may be contributing to her hypokalemia.  Denies any nausea vomiting or diarrhea.  She was ESRD on dialysis M, W, F and last completed a full dialysis session yesterday.  Has had history of hyperkalemia in the past.  Will hold her torsemide.  Will admit to hospital medicine for electrolyte repletion.    Amount and/or Complexity of Data Reviewed  Labs: ordered.                                      Clinical Impression:  Final diagnoses:  [E87.6] Hypokalemia          ED Disposition Condition    Observation Stable                Herbert Casper PA-C  03/26/24 1589

## 2024-03-26 NOTE — PATIENT INSTRUCTIONS
You have extra fluid on you.  Please adhere to a low sodium diet (no more than 1.5 grams of sodium in 24h).  3.   Follow fluid restriction of  2. no more than 1.5 liters in 24 hours..   4. Stop metoprolol tartrate.  5. Start carvedilol 6.25 mg BID.  6.  F/u in 2 months with labs.  7. You are being referred to EP for treatment of atrial fibrillation.  8.  You need more fluid removal through dialysis to achieve dry weight.

## 2024-03-26 NOTE — LETTER
March 26, 2024        Matt Vyas  [address] [facility] [address]  Phone: [phone]  Fax: [phone]             [id number]  [address]  [place] [address]  Phone: [phone]   Patient: Lily Green   MR Number: [id number]   YOB: 1950   Date of Visit: 3/26/2024       Dear Dr. Matt Vyas    Thank you for referring Lily Green to me for evaluation. Attached you will find relevant portions of my assessment and plan of care.    If you have questions, please do not hesitate to call me. I look forward to following Lily Green along with you.    Sincerely,    Deion Thibodeaux MD    Enclosure    If you would like to receive this communication electronically, please contact [email or web address] or [phone] to request [employer] Link access.    [employer] is a tool which provides read-only access to select patient information with whom you have a relationship. Its easy to use and provides real time access to review your patients record including encounter summaries, notes, results, and demographic information.    If you feel you have received this communication in error or would no longer like to receive these types of communications, please e-mail [email or web address]

## 2024-03-26 NOTE — PROGRESS NOTES
148 pm:  Received a critical K values of 2.7  from Girish obando/ [facility] chemistry lab  Repeated back per protocol    1:51 pm  To clinic ,exam room 34 where Dr. Shipman was seeing this pt and handed him written critical K level of 2.7

## 2024-03-26 NOTE — ASSESSMENT & PLAN NOTE
- suspect multifactorial in setting of poor po intake/nutritioin, CHF on torsemide, and also ESRD on HD MWF  - K+2.8 on presentation  - potassium bicarb po 25meq x1  - holding home torsemide at this time  - monitor tele  - repeat labs in am  - Nephrology consulted  ;  appreciate recs  - further management pending clinical course and future study review

## 2024-03-26 NOTE — NURSING
Patient arrived to obs from ED. No complaints of pain, AAOx4, respirations even and unlabored, vital signs stable. . Plan of care and medications discussed with patient; patient verbalized understanding. Safety precautions maintained, care ongoing.

## 2024-03-27 LAB
ABO + RH BLD: NORMAL
ALBUMIN SERPL BCP-MCNC: 1.8 G/DL (ref 3.5–5.2)
ALP SERPL-CCNC: 83 U/L (ref 55–135)
ALT SERPL W/O P-5'-P-CCNC: 15 U/L (ref 10–44)
ANION GAP SERPL CALC-SCNC: 10 MMOL/L (ref 8–16)
AST SERPL-CCNC: 26 U/L (ref 10–40)
BASOPHILS # BLD AUTO: 0.05 K/UL (ref 0–0.2)
BASOPHILS NFR BLD: 0.6 % (ref 0–1.9)
BILIRUB SERPL-MCNC: 0.4 MG/DL (ref 0.1–1)
BLD GP AB SCN CELLS X3 SERPL QL: NORMAL
BLD PROD TYP BPU: NORMAL
BLOOD UNIT EXPIRATION DATE: NORMAL
BLOOD UNIT TYPE CODE: 6200
BLOOD UNIT TYPE: NORMAL
BUN SERPL-MCNC: 25 MG/DL (ref 8–23)
CALCIUM SERPL-MCNC: 9 MG/DL (ref 8.7–10.5)
CHLORIDE SERPL-SCNC: 95 MMOL/L (ref 95–110)
CO2 SERPL-SCNC: 31 MMOL/L (ref 23–29)
CODING SYSTEM: NORMAL
CREAT SERPL-MCNC: 2.7 MG/DL (ref 0.5–1.4)
CROSSMATCH INTERPRETATION: NORMAL
DIFFERENTIAL METHOD BLD: ABNORMAL
DISPENSE STATUS: NORMAL
EOSINOPHIL # BLD AUTO: 0.2 K/UL (ref 0–0.5)
EOSINOPHIL NFR BLD: 2.1 % (ref 0–8)
ERYTHROCYTE [DISTWIDTH] IN BLOOD BY AUTOMATED COUNT: 18.7 % (ref 11.5–14.5)
EST. GFR  (NO RACE VARIABLE): 18.1 ML/MIN/1.73 M^2
GLUCOSE SERPL-MCNC: 135 MG/DL (ref 70–110)
HBV SURFACE AG SERPL QL IA: NORMAL
HCT VFR BLD AUTO: 23.3 % (ref 37–48.5)
HGB BLD-MCNC: 6.9 G/DL (ref 12–16)
IMM GRANULOCYTES # BLD AUTO: 0.12 K/UL (ref 0–0.04)
IMM GRANULOCYTES NFR BLD AUTO: 1.5 % (ref 0–0.5)
LYMPHOCYTES # BLD AUTO: 0.7 K/UL (ref 1–4.8)
LYMPHOCYTES NFR BLD: 8.4 % (ref 18–48)
MAGNESIUM SERPL-MCNC: 1.9 MG/DL (ref 1.6–2.6)
MCH RBC QN AUTO: 28.2 PG (ref 27–31)
MCHC RBC AUTO-ENTMCNC: 29.6 G/DL (ref 32–36)
MCV RBC AUTO: 95 FL (ref 82–98)
MONOCYTES # BLD AUTO: 1.4 K/UL (ref 0.3–1)
MONOCYTES NFR BLD: 17.4 % (ref 4–15)
NEUTROPHILS # BLD AUTO: 5.6 K/UL (ref 1.8–7.7)
NEUTROPHILS NFR BLD: 70 % (ref 38–73)
NRBC BLD-RTO: 0 /100 WBC
NUM UNITS TRANS PACKED RBC: NORMAL
OHS QRS DURATION: 92 MS
OHS QRS DURATION: 96 MS
OHS QTC CALCULATION: 328 MS
OHS QTC CALCULATION: 392 MS
PHOSPHATE SERPL-MCNC: <1 MG/DL (ref 2.7–4.5)
PLATELET # BLD AUTO: 300 K/UL (ref 150–450)
PMV BLD AUTO: 10.5 FL (ref 9.2–12.9)
POCT GLUCOSE: 110 MG/DL (ref 70–110)
POTASSIUM SERPL-SCNC: 3.1 MMOL/L (ref 3.5–5.1)
PROT SERPL-MCNC: 6.5 G/DL (ref 6–8.4)
RBC # BLD AUTO: 2.45 M/UL (ref 4–5.4)
SODIUM SERPL-SCNC: 136 MMOL/L (ref 136–145)
SPECIMEN OUTDATE: NORMAL
WBC # BLD AUTO: 8.05 K/UL (ref 3.9–12.7)

## 2024-03-27 PROCEDURE — 80053 COMPREHEN METABOLIC PANEL: CPT | Performed by: FAMILY MEDICINE

## 2024-03-27 PROCEDURE — 87340 HEPATITIS B SURFACE AG IA: CPT | Performed by: STUDENT IN AN ORGANIZED HEALTH CARE EDUCATION/TRAINING PROGRAM

## 2024-03-27 PROCEDURE — 99222 1ST HOSP IP/OBS MODERATE 55: CPT | Mod: ,,, | Performed by: INTERNAL MEDICINE

## 2024-03-27 PROCEDURE — P9016 RBC LEUKOCYTES REDUCED: HCPCS | Performed by: STUDENT IN AN ORGANIZED HEALTH CARE EDUCATION/TRAINING PROGRAM

## 2024-03-27 PROCEDURE — 63600175 PHARM REV CODE 636 W HCPCS

## 2024-03-27 PROCEDURE — 25000003 PHARM REV CODE 250: Performed by: STUDENT IN AN ORGANIZED HEALTH CARE EDUCATION/TRAINING PROGRAM

## 2024-03-27 PROCEDURE — 94761 N-INVAS EAR/PLS OXIMETRY MLT: CPT

## 2024-03-27 PROCEDURE — 36415 COLL VENOUS BLD VENIPUNCTURE: CPT | Performed by: FAMILY MEDICINE

## 2024-03-27 PROCEDURE — 84100 ASSAY OF PHOSPHORUS: CPT | Performed by: FAMILY MEDICINE

## 2024-03-27 PROCEDURE — 83735 ASSAY OF MAGNESIUM: CPT | Performed by: FAMILY MEDICINE

## 2024-03-27 PROCEDURE — 86850 RBC ANTIBODY SCREEN: CPT | Performed by: STUDENT IN AN ORGANIZED HEALTH CARE EDUCATION/TRAINING PROGRAM

## 2024-03-27 PROCEDURE — 36415 COLL VENOUS BLD VENIPUNCTURE: CPT | Mod: XB | Performed by: STUDENT IN AN ORGANIZED HEALTH CARE EDUCATION/TRAINING PROGRAM

## 2024-03-27 PROCEDURE — 85025 COMPLETE CBC W/AUTO DIFF WBC: CPT | Performed by: FAMILY MEDICINE

## 2024-03-27 PROCEDURE — 97162 PT EVAL MOD COMPLEX 30 MIN: CPT

## 2024-03-27 PROCEDURE — 21400001 HC TELEMETRY ROOM

## 2024-03-27 PROCEDURE — 80100016 HC MAINTENANCE HEMODIALYSIS

## 2024-03-27 PROCEDURE — G0257 UNSCHED DIALYSIS ESRD PT HOS: HCPCS

## 2024-03-27 PROCEDURE — 97165 OT EVAL LOW COMPLEX 30 MIN: CPT

## 2024-03-27 PROCEDURE — 63600175 PHARM REV CODE 636 W HCPCS: Performed by: STUDENT IN AN ORGANIZED HEALTH CARE EDUCATION/TRAINING PROGRAM

## 2024-03-27 PROCEDURE — 5A1D70Z PERFORMANCE OF URINARY FILTRATION, INTERMITTENT, LESS THAN 6 HOURS PER DAY: ICD-10-PCS | Performed by: INTERNAL MEDICINE

## 2024-03-27 PROCEDURE — 86920 COMPATIBILITY TEST SPIN: CPT | Performed by: STUDENT IN AN ORGANIZED HEALTH CARE EDUCATION/TRAINING PROGRAM

## 2024-03-27 PROCEDURE — 97116 GAIT TRAINING THERAPY: CPT

## 2024-03-27 PROCEDURE — 30243N1 TRANSFUSION OF NONAUTOLOGOUS RED BLOOD CELLS INTO CENTRAL VEIN, PERCUTANEOUS APPROACH: ICD-10-PCS | Performed by: INTERNAL MEDICINE

## 2024-03-27 PROCEDURE — 96374 THER/PROPH/DIAG INJ IV PUSH: CPT

## 2024-03-27 PROCEDURE — 97535 SELF CARE MNGMENT TRAINING: CPT

## 2024-03-27 PROCEDURE — 25000003 PHARM REV CODE 250: Performed by: FAMILY MEDICINE

## 2024-03-27 RX ORDER — HYDROCODONE BITARTRATE AND ACETAMINOPHEN 500; 5 MG/1; MG/1
TABLET ORAL
Status: DISCONTINUED | OUTPATIENT
Start: 2024-03-27 | End: 2024-03-29 | Stop reason: HOSPADM

## 2024-03-27 RX ORDER — SODIUM CHLORIDE 9 MG/ML
INJECTION, SOLUTION INTRAVENOUS ONCE
Status: CANCELLED | OUTPATIENT
Start: 2024-03-27 | End: 2024-03-27

## 2024-03-27 RX ORDER — SODIUM,POTASSIUM PHOSPHATES 280-250MG
2 POWDER IN PACKET (EA) ORAL 3 TIMES DAILY
Status: DISCONTINUED | OUTPATIENT
Start: 2024-03-27 | End: 2024-03-28

## 2024-03-27 RX ORDER — POTASSIUM CHLORIDE 20 MEQ/1
40 TABLET, EXTENDED RELEASE ORAL 3 TIMES DAILY
Status: DISPENSED | OUTPATIENT
Start: 2024-03-27 | End: 2024-03-28

## 2024-03-27 RX ORDER — POTASSIUM CHLORIDE 7.45 MG/ML
40 INJECTION INTRAVENOUS ONCE
Status: COMPLETED | OUTPATIENT
Start: 2024-03-27 | End: 2024-03-27

## 2024-03-27 RX ORDER — HEPARIN SODIUM 1000 [USP'U]/ML
1000 INJECTION, SOLUTION INTRAVENOUS; SUBCUTANEOUS
Status: DISCONTINUED | OUTPATIENT
Start: 2024-03-27 | End: 2024-03-29 | Stop reason: HOSPADM

## 2024-03-27 RX ADMIN — LEVOTHYROXINE SODIUM 88 MCG: 88 TABLET ORAL at 05:03

## 2024-03-27 RX ADMIN — ATORVASTATIN CALCIUM 40 MG: 40 TABLET, FILM COATED ORAL at 09:03

## 2024-03-27 RX ADMIN — VALSARTAN 20 MG: 40 TABLET, FILM COATED ORAL at 09:03

## 2024-03-27 RX ADMIN — POTASSIUM & SODIUM PHOSPHATES POWDER PACK 280-160-250 MG 2 PACKET: 280-160-250 PACK at 01:03

## 2024-03-27 RX ADMIN — SEVELAMER CARBONATE 1600 MG: 800 TABLET, FILM COATED ORAL at 01:03

## 2024-03-27 RX ADMIN — PREGABALIN 75 MG: 75 CAPSULE ORAL at 08:03

## 2024-03-27 RX ADMIN — POTASSIUM CHLORIDE 40 MEQ: 1500 TABLET, EXTENDED RELEASE ORAL at 08:03

## 2024-03-27 RX ADMIN — DICYCLOMINE HYDROCHLORIDE 10 MG: 10 CAPSULE ORAL at 09:03

## 2024-03-27 RX ADMIN — Medication 324 MG: at 08:03

## 2024-03-27 RX ADMIN — ASPIRIN 81 MG: 81 TABLET, COATED ORAL at 08:03

## 2024-03-27 RX ADMIN — DICYCLOMINE HYDROCHLORIDE 10 MG: 10 CAPSULE ORAL at 08:03

## 2024-03-27 RX ADMIN — HEPARIN SODIUM 1000 UNITS: 1000 INJECTION, SOLUTION INTRAVENOUS; SUBCUTANEOUS at 06:03

## 2024-03-27 RX ADMIN — Medication 1 TABLET: at 08:03

## 2024-03-27 RX ADMIN — VALSARTAN 20 MG: 40 TABLET, FILM COATED ORAL at 08:03

## 2024-03-27 RX ADMIN — AMIODARONE HYDROCHLORIDE 200 MG: 200 TABLET ORAL at 09:03

## 2024-03-27 RX ADMIN — CARVEDILOL 6.25 MG: 6.25 TABLET, FILM COATED ORAL at 08:03

## 2024-03-27 RX ADMIN — POTASSIUM CHLORIDE 40 MEQ: 1500 TABLET, EXTENDED RELEASE ORAL at 09:03

## 2024-03-27 RX ADMIN — POTASSIUM & SODIUM PHOSPHATES POWDER PACK 280-160-250 MG 2 PACKET: 280-160-250 PACK at 09:03

## 2024-03-27 RX ADMIN — VENLAFAXINE 37.5 MG: 37.5 TABLET ORAL at 08:03

## 2024-03-27 RX ADMIN — GUAIFENESIN AND DEXTROMETHORPHAN HYDROBROMIDE 1 TABLET: 600; 30 TABLET, EXTENDED RELEASE ORAL at 08:03

## 2024-03-27 RX ADMIN — POTASSIUM CHLORIDE 40 MEQ: 7.46 INJECTION, SOLUTION INTRAVENOUS at 09:03

## 2024-03-27 RX ADMIN — AMIODARONE HYDROCHLORIDE 200 MG: 200 TABLET ORAL at 08:03

## 2024-03-27 RX ADMIN — POLYETHYLENE GLYCOL 3350 17 G: 17 POWDER, FOR SOLUTION ORAL at 09:03

## 2024-03-27 RX ADMIN — GUAIFENESIN AND DEXTROMETHORPHAN HYDROBROMIDE 1 TABLET: 600; 30 TABLET, EXTENDED RELEASE ORAL at 09:03

## 2024-03-27 RX ADMIN — PANTOPRAZOLE SODIUM 40 MG: 40 TABLET, DELAYED RELEASE ORAL at 05:03

## 2024-03-27 RX ADMIN — SEVELAMER CARBONATE 1600 MG: 800 TABLET, FILM COATED ORAL at 09:03

## 2024-03-27 RX ADMIN — SODIUM PHOSPHATE, MONOBASIC, MONOHYDRATE AND SODIUM PHOSPHATE, DIBASIC, ANHYDROUS 39.99 MMOL: 142; 276 INJECTION, SOLUTION INTRAVENOUS at 01:03

## 2024-03-27 NOTE — NURSING
Nurses Note -- 4 Eyes      3/27/2024   1:00 AM      Skin assessed durinp-7a Assessment for Varun Score of 15      [] No Altered Skin Integrity Present    []Prevention Measures Documented      [x] Yes- Altered Skin Integrity Present or Discovered   [x] LDA Added if Not in Epic (Describe Wound)   [x] New Altered Skin Integrity was Present on Admit and Documented in LDA   [x] Wound Image Taken    Wound Care Consulted? yes    Attending Nurse:  Tamera Verma RN/Staff Member:  Tamera LARA    Initial 4-eye assessment done in the er. Pt has a wound to her sacrum/buttock region.pink in color.barrier cream applied.purewick in use to keep skin clean and dry.heels floated.

## 2024-03-27 NOTE — PT/OT/SLP EVAL
Occupational Therapy   Evaluation    Name: Lily Green  MRN: [id number]  Admitting Diagnosis: Hypokalemia  Recent Surgery: * No surgery found *      Recommendations:     Discharge Recommendations: Moderate Intensity Therapy  Discharge Equipment Recommendations:  walker, rolling  Barriers to discharge:   Decreased caregiver support    Assessment:     Lily Green is a 73 y.o. female with a medical diagnosis of Hypokalemia.  She presents with the following performance deficits affecting function: weakness, impaired endurance, impaired self care skills, impaired functional mobility, gait instability, impaired balance, decreased lower extremity function, impaired cardiopulmonary response to activity, impaired coordination, decreased coordination.      Rehab Prognosis: Good; patient would benefit from acute skilled OT services to address these deficits and reach maximum level of function.       Plan:     Patient to be seen  4x/week to address the above listed problems via self-care/home management, therapeutic activities, therapeutic exercises, neuromuscular re-education  Plan of Care Expires:  4/27/24  Plan of Care Reviewed with: patient, spouse    Subjective     Chief Complaint: Fatigue   Patient/Family Comments/goals: Particiapte with therapy    Occupational Profile:  Living Environment: Pt lives alone, SSH, TTE  Previous level of function: Mod I prior to SNF admission; from outside SNF patient requiring A with ADL's and mobility and increased time   Roles and Routines: Enjoys time with her dog  Equipment Used at Home: none  Assistance upon Discharge: Spouse and sister    Pain/Comfort:  Pain Rating 1: 0/10  Pain Rating Post-Intervention 1: 0/10    Patients cultural, spiritual, Roman Catholic conflicts given the current situation: no    Objective:     Communicated with: Nursing prior to session.  Patient found HOB elevated with PureWick, oxygen upon OT entry to room.    General Precautions: Standard,  fall  Orthopedic Precautions: N/A  Braces: N/A  Respiratory Status: Room air    Occupational Performance:    Bed Mobility:    Patient completed Rolling/Turning to Right with stand by assistance and with side rail  Patient completed Scooting/Bridging with stand by assistance and with side rail  Patient completed Supine to Sit with stand by assistance and with side rail    Functional Mobility/Transfers:  Patient completed Sit <> Stand Transfer with contact guard assistance  with  rolling walker   Patient completed Bed <> Chair Transfer using Step Transfer technique with contact guard assistance with rolling walker  Functional Mobility: Cga with rolling walker ~10 ft within room; required cue sfor self pacing upirght posture and sfdty prior otreturning to sit in bedside chair     Activities of Daily Living:  Grooming: stand by assistance seated EOB  Upper Body Dressing: minimum assistance seated edge of bed  Lower Body Dressing: moderate assistance seated edge of bed    Cognitive/Visual Perceptual:  A&Ox4    Physical Exam:  BUE's - strength and ROM, coordinaiton and sesnsatio nWFL's     AMPAC 6 Click ADL:  AMPAC Total Score: 15    Treatment & Education:  -Evaluation completed, plan of care established.  -Patient and family educated on roles/goals of OT and POC.  -White board updated.  -Therapist provided time for questions and answered within scope of practice.  -Patient educated on importance of EOB/OOB activity to maximize recovery.    Patient left up in chair with all lines intact, call button in reach, nursing notified, and spouse and sister present    GOALS:   Multidisciplinary Problems       Occupational Therapy Goals          Problem: Occupational Therapy    Goal Priority Disciplines Outcome Interventions   Occupational Therapy Goal     OT, PT/OT Ongoing, Progressing    Description: Goals to be met by: 4/27/24     Patient will increase functional independence with ADLs by performing:    UE Dressing with Set-up  Assistance.  LE Dressing with Stand-by Assistance.  Grooming while standing at sink with Stand-by Assistance.  Toileting from toilet with Stand-by Assistance for hygiene and clothing management.   Supine to sit with Modified [place].  Step transfer with Stand-by Assistance  Toilet transfer to toilet with Stand-by Assistance.                         History:     Past Medical History:   Diagnosis Date    Anxiety     Celiac disease 2018    Celiac disease     Depression     Diabetes mellitus     Family history of breast cancer in mother      at age 68    Hyperlipidemia     Hypertension     Meniere disease     Meniere's disease, unspecified ear     Menopause     Peptic ulcer     Peritonitis 2023    Reflux esophagitis     Urinary tract infection     Vaginal infection     Vaginal Pap smear 2014    Pap/Hpv Negative          Past Surgical History:   Procedure Laterality Date    APPENDECTOMY      BREAST SURGERY      Tags    CARPAL TUNNEL RELEASE Bilateral 2017    ,     CLOSURE, COLOSTOMY Left 2023    Procedure: CLOSURE, COLOSTOMY;  Surgeon: Manuel Javier MD;  Location: [facility] OR;  Service: General;  Laterality: Left;    COLONOSCOPY  2018    Normal  ( Juan A)     COLOSTOMY Right 2023    Procedure: CREATION, COLOSTOMY;  Surgeon: Manuel Javier MD;  Location: [facility] OR;  Service: General;  Laterality: Right;    CORONARY ANGIOGRAPHY N/A 2024    Procedure: ANGIOGRAM, CORONARY ARTERY;  Surgeon: Todd Carlisle MD;  Location: [facility] CATH LAB;  Service: Cardiology;  Laterality: N/A;    DILATION AND CURETTAGE OF UTERUS  1986    DUPUYTREN CONTRACTURE RELEASE Bilateral 2017    ESOPHAGOGASTRODUODENOSCOPY N/A 2024    Procedure: EGD (ESOPHAGOGASTRODUODENOSCOPY);  Surgeon: Yamilet Walters MD;  Location: [facility] ENDO ([address] [place] [address]);  Service: Gastroenterology;  Laterality: N/A;    ESOPHAGOGASTRODUODENOSCOPY N/A 2024    Procedure: EGD (ESOPHAGOGASTRODUODENOSCOPY);   Surgeon: Yamilet Walters MD;  Location: [facility] ENDO (2ND FLR);  Service: Gastroenterology;  Laterality: N/A;    HYSTERECTOMY  1987    BEAU w/ appy, no BSO     IMPLANTATION OF LEADLESS PACEMAKER N/A 1/16/2024    Procedure: INSERTION, CARDIAC PACEMAKER, LEADLESS;  Surgeon: LENIN Frances MD;  Location: [facility] EP LAB;  Service: Cardiology;  Laterality: N/A;  SB, LEADLESS PPM (MICRA), MDT, ANES, EH, CVICU [id number]    INJECTION OF NEUROLYTIC AGENT AROUND LUMBAR SYMPATHETIC NERVE N/A 1/6/2022    Procedure: BLOCK, LUMBAR SYMPATHETIC;  Surgeon: Souleymane Rizo Jr., MD;  Location: [facility] PAIN MGT;  Service: Pain Management;  Laterality: N/A;  no pacemaker   pt is diabetic     INJECTION OF NEUROLYTIC AGENT AROUND LUMBAR SYMPATHETIC NERVE N/A 8/25/2022    Procedure: BLOCK, LUMBAR SYMPATHETIC;  Surgeon: Souleymane Rizo Jr., MD;  Location: [facility] PAIN MGT;  Service: Pain Management;  Laterality: N/A;  diabetic     INSERTION OF TUNNELED CENTRAL VENOUS HEMODIALYSIS CATHETER Right 1/25/2023    Procedure: INSERTION, CATHETER, HEMODIALYSIS, DUAL LUMEN;  Surgeon: Manuel Javier MD;  Location: [facility] OR;  Service: General;  Laterality: Right;    INSERTION, CATHETER, TUNNELED Left 1/28/2023    Procedure: Insertion,catheter,tunneled;  Surgeon: Watson Fontenot MD;  Location: [facility] OR;  Service: General;  Laterality: Left;    LAPAROTOMY, EXPLORATORY N/A 1/14/2023    Procedure: LAPAROTOMY, EXPLORATORY;  Surgeon: Manuel Javier MD;  Location: [facility] OR;  Service: General;  Laterality: N/A;    LAPAROTOMY, EXPLORATORY N/A 1/16/2023    Procedure: LAPAROTOMY, EXPLORATORY;  Surgeon: Manuel Javier MD;  Location: [facility] OR;  Service: General;  Laterality: N/A;    LEFT HEART CATHETERIZATION Left 2/21/2024    Procedure: Left heart cath;  Surgeon: Todd Carlisle MD;  Location: [facility] CATH LAB;  Service: Cardiology;  Laterality: Left;    LYMPHADENECTOMY Right 1/2/2024    Procedure: LYMPHADENECTOMY;  Surgeon: Tan Thomson MD;  Location:  [facility] OR 2ND FLR;  Service: Cardiothoracic;  Laterality: Right;    PACEMAKER, TEMPORARY, TRANSVENOUS, PEDIATRIC Right 1/12/2024    Procedure: Pacemaker, Temporary, Transvenous;  Surgeon: Todd Carlisle MD;  Location: [facility] CATH LAB;  Service: Cardiology;  Laterality: Right;    REMOVAL OF CATHETER Right 1/28/2023    Procedure: REMOVAL, CATHETER;  Surgeon: Watson Fontenot MD;  Location: [facility] OR;  Service: General;  Laterality: Right;    REMOVAL, TUNNELED CATH Right 1/25/2023    Procedure: REMOVAL, TUNNELED CATH;  Surgeon: Manuel Javier MD;  Location: [facility] OR;  Service: General;  Laterality: Right;    ROBOTIC BRONCHOSCOPY N/A 10/20/2023    Procedure: ROBOTIC BRONCHOSCOPY;  Surgeon: Mayda Monzon MD;  [facility];  Service: Pulmonary;  Laterality: N/A;    SHOULDER SURGERY  2009 & 2010    right rotator cuff    THORACOTOMY Right 1/2/2024    Procedure: THORACOTOMY;  Surgeon: Tan Thomson MD;  [facility];  Service: Cardiothoracic;  Laterality: Right;    THORACOTOMY, WITH INITIAL THERAPEUTIC WEDGE RESECTION OF LUNG Right 1/2/2024    Procedure: THORACOTOMY, WITH INITIAL THERAPEUTIC WEDGE RESECTION OF LUNG;  Surgeon: Tan Thomson MD;  [facility];  Service: Cardiothoracic;  Laterality: Right;    TONSILLECTOMY      UPPER GASTROINTESTINAL ENDOSCOPY  04/2018       Time Tracking:     OT Date of Treatment: 03/27/24  OT Start Time: 1322  OT Stop Time: 1345  OT Total Time (min): 23 min    Billable Minutes:Evaluation 1 unit  Self Care/Home Management 1 unit    3/27/2024

## 2024-03-27 NOTE — PT/OT/SLP EVAL
Physical Therapy Co-Evaluation and Co-Treatment  Co-evaluation with OT for maximal pt participation, safety, and activity tolerance    Patient Name:  Lily Green   MRN:  [id number]  Admit Date: 3/26/2024  Admitting Diagnosis:  Hypokalemia   Length of Stay: 0 days  Recent Surgery: * No surgery found *      Recommendations:     Discharge Recommendations:  Moderate Intensity Therapy  Discharge Equipment Recommendations: walker, rolling   Justification for Equipment: The mobility limitation cannot be sufficiently resolved by the use of a cane. Patient's functional mobility deficit can be sufficiently resolved with the use of a Rolling Walker. Patient's mobility limitation significantly impairs their ability to participate in one of more activities of daily living. The use of a Rolling Walker will significantly improve the patient's ability to participate in MRADLS and the patient will use it on regular basis in the home.   Barriers to discharge: Decreased Caregiver support and Evolving Clinical Presentation    Assessment:     Lily Green is a 73 y.o. female admitted with a medical diagnosis of Hypokalemia.  She presents with the following impairments/functional limitations: gait instability, impaired endurance, impaired balance, impaired self care skills, impaired functional mobility, decreased safety awareness, decreased lower extremity function.     Pt agreeable to therapy before dialysis. Pt with good bed mobility but very poor standing endurance, needs to sit down almost immediately after beginning ambulation. Due to low H&H and potassium decision made to hold further ambulation until tomorrow once she has received transfusion and dialysis.     Rehab Prognosis: Fair; patient would benefit from acute skilled PT services to address these deficits and reach maximum level of function.      Treatment Tolerated: Fair    Highest level of mobility achieved this visit: ambulates 10ft w/ RW and  "CGA    Activity with RN/PCT: transfer with one person assist    Plan:     During this hospitalization, patient to be seen 4 x/week to address the identified rehab impairments via gait training, therapeutic activities, therapeutic exercises, neuromuscular re-education and progress towards the established goals.    Plan of Care Expires:  04/27/24    Subjective     RN Dennise notified prior to session. Pt's  and family present upon PT entrance into room.    Chief Complaint: leg fatigue  Patient/Family Comments/goals: "Me and my  live separately"  Pain/Comfort:  Pain Rating 1: 0/10    Social History:  Residence: lives alone 1-story house with threshold OSMIN.  Support available: family and Spouse  Equipment Used: none  Equipment Owned (not using): bedside commode, wheelchair  Prior level of function: assist required for ADLs and mobility  Work: Retired.   Drive: no.       Objective:     Additional staff present: OT Elizabeth    Patient found HOB elevated with: PureWick, oxygen, telemetry     General Precautions: Standard, fall   Orthopedic Precautions:N/A   Braces: N/A   Body mass index is 19.96 kg/m².  Oxygen Device: Nasal Cannula 2L    Vitals: BP (!) 105/54 (BP Location: Right leg, Patient Position: Lying)   Pulse 65   Temp 97.5 °F (36.4 °C) (Tympanic)   Resp 16   Ht 5' 5" (1.651 m)   Wt 54.4 kg (119 lb 14.9 oz)   LMP  (LMP Unknown) Comment: BEAU/ NO BSO  1986  SpO2 100%   BMI 19.96 kg/m²     Exams:  Cognition:   Alert and Cooperative  Command following: Follows two-step verbal commands  Fluency: clear/fluent  Hearing: Intact  Vision:  Intact  Skin Integrity: Visible skin intact    Physical Exam:   Edema - Moderate NELSON LEs  ROM - NELSON LEs WFL  Strength - NELSON LEs WFL   Sensation - Intact to light touch  Coordination - No deficits noted    Outcome Measures:    AM-PAC 6 CLICK MOBILITY  Turning over in bed (including adjusting bedclothes, sheets and blankets)?: 3  Sitting down on and standing up from a " chair with arms (e.g., wheelchair, bedside commode, etc.): 3  Moving from lying on back to sitting on the side of the bed?: 3  Moving to and from a bed to a chair (including a wheelchair)?: 3  Need to walk in hospital room?: 3  Climbing 3-5 steps with a railing?: 2  Basic Mobility Total Score: 17     Functional Mobility:    Bed Mobility:   Supine to Sit: stand by assistance; from R side of bed  Scooting anteriorly to EOB to have both feet planted on floor: stand by assistance    Sitting Balance at Edge of Bed:  Assistance Level Required: Stand-by Assistance  Time: 10 min  Postural deviations noted: mildly slouched posture    Transfers:   Sit <> Stand Transfer: contact guard assistance with rolling walker  Stand <> Sit Transfer: contact guard assistance with rolling walker  x1 trials from EOB    Standing Balance:  Assistance Level Required: Contact Guard Assistance  Patient used: rolling walker  Time: 5 min  Postural deviations noted: no deviations noted      Gait:   Patient ambulated: 10ft   Patient required: contact guard  Patient used:  rolling walker  Gait Pattern observed: swing-to gait  Gait Deviation(s): decreased step length, narrow base of support, and decreased darrell  Impairments due to: impaired balance and decreased endurance  Portable Supplemental O2 2L utilized  all lines remained intact throughout ambulation trial  Chair follow for patient safety  Gait belt utilized  Comments: Very quick to sit back down    Education:  Time provided for education, counseling and discussion of health disposition in regards to patient's current status  All questions answered within PT scope of practice and to patient's satisfaction  PT role in POC to address current functional deficits  Pt educated on proper body mechanics, safety techniques, and energy conservation with PT facilitation and cueing throughout session    Patient left up in chair with all lines intact, call button in reach, and family present.    GOALS:    Multidisciplinary Problems       Physical Therapy Goals          Problem: Physical Therapy    Goal Priority Disciplines Outcome Goal Variances Interventions   Physical Therapy Goal     PT, PT/OT Ongoing, Progressing     Description: Goals to met by 4/10/2024    1. Sit to stand transfer with Supervision  2. Bed to chair transfer with Stand-by Assistance using Rolling Walker  3. Gait  x 150 feet with Stand-by Assistance using Rolling Walker   4. Ascend/Descend 6 inch curb step with Stand-by Assistance using Rolling Walker.  5. Stand for 5 minutes with Stand-by Assistance using Rolling Walker  6. Lower extremity exercise program x15 reps per Instruction, with assistance as needed in order to facilitate improvement in functional independence                       History:     Past Medical History:   Diagnosis Date    Anxiety     Celiac disease 2018    Celiac disease     Depression     Diabetes mellitus     Family history of breast cancer in mother      at age 68    Hyperlipidemia     Hypertension     Meniere disease     Meniere's disease, unspecified ear     Menopause     Peptic ulcer     Peritonitis 2023    Reflux esophagitis     Urinary tract infection     Vaginal infection     Vaginal Pap smear 2014    Pap/Hpv Negative        Past Surgical History:   Procedure Laterality Date    APPENDECTOMY      BREAST SURGERY      Tags    CARPAL TUNNEL RELEASE Bilateral 2017    ,     CLOSURE, COLOSTOMY Left 2023    Procedure: CLOSURE, COLOSTOMY;  Surgeon: Manuel Javier MD;  Location: [facility] OR;  Service: General;  Laterality: Left;    COLONOSCOPY  2018    Normal  (Cornell Lizarragaald)     COLOSTOMY Right 2023    Procedure: CREATION, COLOSTOMY;  Surgeon: Manuel Javier MD;  Location: [facility] OR;  Service: General;  Laterality: Right;    CORONARY ANGIOGRAPHY N/A 2024    Procedure: ANGIOGRAM, CORONARY ARTERY;  Surgeon: Todd Carlisle MD;  Location: [facility] CATH LAB;  Service:  Cardiology;  Laterality: N/A;    DILATION AND CURETTAGE OF UTERUS  1986    DUPUYTREN CONTRACTURE RELEASE Bilateral 09/2017    ESOPHAGOGASTRODUODENOSCOPY N/A 1/24/2024    Procedure: EGD (ESOPHAGOGASTRODUODENOSCOPY);  Surgeon: Yamilet Walters MD;  Location: [facility] ENDO (2ND FLR);  Service: Gastroenterology;  Laterality: N/A;    ESOPHAGOGASTRODUODENOSCOPY N/A 1/26/2024    Procedure: EGD (ESOPHAGOGASTRODUODENOSCOPY);  Surgeon: Yamilet Walters MD;  Location: [facility] ENDO (2ND FLR);  Service: Gastroenterology;  Laterality: N/A;    HYSTERECTOMY  1987    BEAU w/ appy, no BSO     IMPLANTATION OF LEADLESS PACEMAKER N/A 1/16/2024    Procedure: INSERTION, CARDIAC PACEMAKER, LEADLESS;  Surgeon: LENIN Frances MD;  Location: [facility] EP LAB;  Service: Cardiology;  Laterality: N/A;  SB, LEADLESS PPM (MICRA), MDT, ANES, EH, CVICU [id number]    INJECTION OF NEUROLYTIC AGENT AROUND LUMBAR SYMPATHETIC NERVE N/A 1/6/2022    Procedure: BLOCK, LUMBAR SYMPATHETIC;  Surgeon: Souleymane Rizo Jr., MD;  Location: [facility] PAIN MGT;  Service: Pain Management;  Laterality: N/A;  no pacemaker   pt is diabetic     INJECTION OF NEUROLYTIC AGENT AROUND LUMBAR SYMPATHETIC NERVE N/A 8/25/2022    Procedure: BLOCK, LUMBAR SYMPATHETIC;  Surgeon: Souleymane Rizo Jr., MD;  Location: [facility] PAIN MGT;  Service: Pain Management;  Laterality: N/A;  diabetic     INSERTION OF TUNNELED CENTRAL VENOUS HEMODIALYSIS CATHETER Right 1/25/2023    Procedure: INSERTION, CATHETER, HEMODIALYSIS, DUAL LUMEN;  Surgeon: Manuel Javier MD;  Location: [facility] OR;  Service: General;  Laterality: Right;    INSERTION, CATHETER, TUNNELED Left 1/28/2023    Procedure: Insertion,catheter,tunneled;  Surgeon: Watson Fontenot MD;  Location: [facility] OR;  Service: General;  Laterality: Left;    LAPAROTOMY, EXPLORATORY N/A 1/14/2023    Procedure: LAPAROTOMY, EXPLORATORY;  Surgeon: Manuel Javier MD;  Location: [facility] OR;  Service: General;  Laterality: N/A;    LAPAROTOMY, EXPLORATORY N/A 1/16/2023     Procedure: LAPAROTOMY, EXPLORATORY;  Surgeon: Manuel Javier MD;  Location: [facility] OR;  Service: General;  Laterality: N/A;    LEFT HEART CATHETERIZATION Left 2/21/2024    Procedure: Left heart cath;  Surgeon: Todd Carlisle MD;  Location: [facility] CATH LAB;  Service: Cardiology;  Laterality: Left;    LYMPHADENECTOMY Right 1/2/2024    Procedure: LYMPHADENECTOMY;  Surgeon: Tan Thomson MD;  [facility];  Service: Cardiothoracic;  Laterality: Right;    PACEMAKER, TEMPORARY, TRANSVENOUS, PEDIATRIC Right 1/12/2024    Procedure: Pacemaker, Temporary, Transvenous;  Surgeon: Todd Carlisle MD;  Location: [facility] CATH LAB;  Service: Cardiology;  Laterality: Right;    REMOVAL OF CATHETER Right 1/28/2023    Procedure: REMOVAL, CATHETER;  Surgeon: Watson Fontenot MD;  Location: [facility] OR;  Service: General;  Laterality: Right;    REMOVAL, TUNNELED CATH Right 1/25/2023    Procedure: REMOVAL, TUNNELED CATH;  Surgeon: Manuel Javier MD;  Location: [facility] OR;  Service: General;  Laterality: Right;    ROBOTIC BRONCHOSCOPY N/A 10/20/2023    Procedure: ROBOTIC BRONCHOSCOPY;  Surgeon: Mayda Monzon MD;  [facility];  Service: Pulmonary;  Laterality: N/A;    SHOULDER SURGERY  2009 & 2010    right rotator cuff    THORACOTOMY Right 1/2/2024    Procedure: THORACOTOMY;  Surgeon: Tan Thomson MD;  [facility];  Service: Cardiothoracic;  Laterality: Right;    THORACOTOMY, WITH INITIAL THERAPEUTIC WEDGE RESECTION OF LUNG Right 1/2/2024    Procedure: THORACOTOMY, WITH INITIAL THERAPEUTIC WEDGE RESECTION OF LUNG;  Surgeon: Tan Thomson MD;  [facility];  Service: Cardiothoracic;  Laterality: Right;    TONSILLECTOMY      UPPER GASTROINTESTINAL ENDOSCOPY  04/2018       Time Tracking:     PT Received On: 03/27/24  PT Start Time: 1322     PT Stop Time: 1345  PT Total Time (min): 23 min     Billable Minutes: Evaluation 1 procedure and Therapeutic Activity 10  doreen Manning, PT, DPT  3/27/2024

## 2024-03-27 NOTE — PROGRESS NOTES
[facility] Observation [address] [place] [address] Medicine  Progress Note    Patient Name: Lily Green  MRN: [id number]  Patient Class: IP- Inpatient   Admission Date: 3/26/2024  Length of Stay: 0 days  Attending Physician: Dheeraj Chun MD  Primary Care Provider: Pavel Calixto MD        Subjective:     Principal Problem:Hypokalemia        HPI:  For extended Hx see below and recent Hospital and Progress Notes in EMR.  Breifly: Patient presents to ED today for hypokalemia. She was sent from SNF facility where she has reportedly continued to still be slowly improving and benifiting, currently using walker/wheelchair for ambulation and  states able to walk 150ft with assistance. She was sent from SNF to Cardiology clinic to establish care. Found to be hypokalemic and sent to the ED for further management.    In the ED patient afebrile and hemodynamically stable saturating well on 2L at rest. Reports unchanged symptoms of generalized fatigue and weakness but reports eagerness to return to SNF to continue PT/OT once able/appropriate. K+ 2.8. She is on HD MWF. Still makes urine and takes torsemide BID. She was given dose of po Kcl and admitted to the care of medicine for further evaluation and management.     Lily Green is a 73 year old white woman with celiac disease, diabetes mellitus type 2 with polyneuropathy, hypertension, hyperlipidemia, history of peptic ulcer disease, depression, anxiety, history of hysterectomy in 1987, history of peritonitis due to diffuse colonic ischemia status post total colectomy and end ileostomy creation on 1/14/2023, evacuation of intraabdominal hematoma, takedown of left lateral ileostomy and recreation of right lateral end ileostomy on 1/16/2023, elective robotic ileostomy reversal and ileocolonic anastomosis on 8/7/2023, irritable bowel syndrome, ESRD on HD MWF, right middle lobe carcinoid tumor status post robotic-assisted converted to right thoracotomy with  therapeutic wedge resection and mediastinal lymph node dissection and repair of bleeding right inferior pulmonary vein on 1/2/2024, atrial fibrillation, [employer]tronic Micra AV leadless cardiac pacemaker placement on 1/16/2024.              She was hospitalized at [facility] from 1/2/2024 to 2/6/2024 for right middle lobe carcinoid tumor, parapneumonic effusion, atrial fibrillation with rapid ventricular response, tachy-eulogio syndrome, gastrointestinal bleeding while on heparin. She was discharged to [facility] nursing [facility].              She presented to [facility] Emergency Department on 2/12/2024 from the [facility] nursing [facility] with increased work of breathing, dyspnea, and hypoxia. Iniitally admitted to MICU for emergent HD and resp failure. Found to have new cardiomyopathy and HFrEF. Managed for volume overload and also PNA. She was discharged to the skilled nursing facility on 3/1/2024.     Overview/Hospital Course:  Pt admitted to [facility] and remained stable overnight and into 3/27/2024. Aggressive potassium and phosphorus replacement provided. Restarted home calcium supplementation. 1u pRBCs provided for Hb 6.9, grossly asymptomatic. SW/CM assisting with discharge planning back to SNF when medically ready.    Interval History: Pt seen and examined this morning on mackenzie MORGAN. States she overall feels very lethargic, though this has been her baseline for the past several weeks. Denies any new symptoms. Discussed plans for aggressive electrolyte replacement and pRBC transfusion before returning to her SNF. Care plan reviewed. Otherwise, doing well and with no further complaints at this time.      Objective:     Vital Signs (Most Recent):  Temp: 97.5 °F (36.4 °C) (03/27/24 1158)  Pulse: 65 (03/27/24 1158)  Resp: 16 (03/27/24 1158)  BP: (!) 105/54 (03/27/24 1158)  SpO2: 100 % (03/27/24 1158) Vital Signs (24h Range):  Temp:  [97.2 °F (36.2 °C)-98.2  °F (36.8 °C)] 97.5 °F (36.4 °C)  Pulse:  [65-78] 65  Resp:  [12-20] 16  SpO2:  [100 %] 100 %  BP: (105-213)/() 105/54     Weight: 54.4 kg (119 lb 14.9 oz)  Body mass index is 19.96 kg/m².    Intake/Output Summary (Last 24 hours) at 3/27/2024 1252  Last data filed at 3/27/2024 0631  Gross per 24 hour   Intake 120 ml   Output 0 ml   Net 120 ml         Physical Exam  Constitutional:       General: She is not in acute distress.     Appearance: She is not toxic-appearing or diaphoretic.      Comments: Chronically ill appearing, frail, underweight   HENT:      Head: Normocephalic and atraumatic.      Nose: Nose normal.      Mouth/Throat:      Mouth: Mucous membranes are dry.   Eyes:      General: No scleral icterus.     Extraocular Movements: Extraocular movements intact.      Pupils: Pupils are equal, round, and reactive to light.   Cardiovascular:      Rate and Rhythm: Normal rate. Rhythm irregular.   Pulmonary:      Effort: Pulmonary effort is normal. No respiratory distress.      Breath sounds: Rales present. No wheezing.   Abdominal:      General: Abdomen is flat. There is no distension.      Palpations: Abdomen is soft.      Tenderness: There is no abdominal tenderness. There is no guarding.   Musculoskeletal:         General: Normal range of motion.      Cervical back: Normal range of motion and neck supple. No rigidity.      Right lower leg: Edema present.      Left lower leg: Edema present.   Skin:     General: Skin is warm and dry.   Neurological:      General: No focal deficit present.      Mental Status: She is alert and oriented to person, place, and time.   Psychiatric:         Mood and Affect: Mood normal.         Behavior: Behavior normal.         Significant Labs: All pertinent labs within the past 24 hours have been reviewed.    Significant Imaging: I have reviewed all pertinent imaging results/findings within the past 24 hours.    Assessment/Plan:      * Hypokalemia  - suspect multifactorial in  setting of poor po intake/nutritioin, CHF on torsemide, and also ESRD on HD MWF  - K+2.8 on presentation, improved to 3.1 on AM labs  - Continuing with PO K replacement  - Monitoring on daily labs  - Grossly asymptomatic from baseline  - Tele in place    Hypophosphatemia  - Phos <1.0 on AM labs  - Replacing IV and PO  - Monitoring on daily labs    Hypothyroid  - TSH 19.9  - increased home synthroid from 75mcg to 88mcg  - repeat TSH in 6wks      Chronic combined systolic and diastolic heart failure  - holding home torsemide at this time while correcting hypokalemia  - monitor tele  - strict I/Os  - daily weights  - low sodium fluid restricted renal gluten free diet  - continue to closely monitor       Moderate malnutrition  - novasource renal TID    Atrial fibrillation  - continue home amiodarone, coreg  - not on anticoagulation due to hx of GIB    ESRD (end stage renal disease)  - Nephrology consulted for HD management    Malignant carcinoid tumor of bronchus  - sp resection      Debility  - PT/OT following  - SW/CM enlisted for assistance with discharge planning back to current SNF    Primary hypertension  - continue home coreg, valsartan    Generalized anxiety disorder  - continue home venlafaxine      Meniere's disease of both ears  - home meclizine prn      Celiac disease  - gluten free renal diet        VTE Risk Mitigation (From admission, onward)           Ordered     IP VTE HIGH RISK PATIENT  Once         03/26/24 1747     Place sequential compression device  Until discontinued         03/26/24 1747                    Discharge Planning   CAMPBELL: 3/28/2024     Code Status: Full Code   Is the patient medically ready for discharge?: No    Reason for patient still in hospital (select all that apply): Patient trending condition  Discharge Plan A: Skilled Nursing Facility          Dheeraj Chun MD  Attending Physician  Medical Director - [facility] Observation Unit  Department of Hospital Medicine  3/27/2024

## 2024-03-27 NOTE — NURSING
Nurses Note -- 4 Eyes      3/27/2024   5:15 PM      Skin assessed during: Q Shift Change      [] No Altered Skin Integrity Present    []Prevention Measures Documented      [x] Yes- Altered Skin Integrity Present or Discovered   [] LDA Added if Not in Epic (Describe Wound)   [x] New Altered Skin Integrity was Present on Admit and Documented in LDA   [] Wound Image Taken    Wound Care Consulted? No  Wound care orders are in place.    Attending Nurse:  Christy Verma RN/Staff Member:   Nisreen

## 2024-03-27 NOTE — ASSESSMENT & PLAN NOTE
Outpatient ESRD Info;  Modality; HD  Access; [facility]  Schedule; [facility]  Facility; [facility]  Nephrologist; Dr Vyas  UOP; Minimal  Last HD; 03/25/2024  EDW; ?    Plan/Recommendation;  -HD today, consent obtained.  -HgB 6.9, would need EPO  -F/U Iron studies, ferritin  -F/U PTH, Vit D, PO4  -PO4 < 1, would need phosphate replacement  -I/Os  -Renal diet

## 2024-03-27 NOTE — ASSESSMENT & PLAN NOTE
Admitted with hypokaleima of 2.7  Received Potassium replacemenmt (oral and I/V)  Will keep her on 4K bath during dialysis

## 2024-03-27 NOTE — CONSULTS
[facility] - Observation 11H  Nephrology  Consult Note    Patient Name: Lily Green  MRN: [id number]  Admission Date: 3/26/2024  Hospital Length of Stay: 0 days  Attending Provider: Dheeraj Chun MD   Primary Care Physician: Pavel Calixto MD  Principal Problem:Hypokalemia    Inpatient consult to Nephrology  Consult performed by: Hanane Ortega MD  Consult ordered by: Mandeep Hess MD        Subjective:     HPI: Patient presents to ED today for hypokalemia. She was sent from SNF facility where she has reportedly continued to still be slowly improving and benifiting, currently using walker/wheelchair for ambulation and  states able to walk 150ft with assistance. She was sent from SNF to Cardiology clinic to establish care. Found to be hypokalemic and sent to the ED for further management.     In the ED patient afebrile and hemodynamically stable saturating well on 2L at rest. Reports unchanged symptoms of generalized fatigue and weakness but reports eagerness to return to SNF to continue PT/OT once able/appropriate. K+ 2.8. She is on HD MWF. Still makes urine and takes torsemide BID. She was given dose of po Kcl.    Nephrology consulted for inpatient ESRD management.    Past Medical History:   Diagnosis Date    Anxiety     Celiac disease 2018    Celiac disease     Depression     Diabetes mellitus     Family history of breast cancer in mother      at age 68    Hyperlipidemia     Hypertension     Meniere disease     Meniere's disease, unspecified ear     Menopause     Peptic ulcer     Peritonitis 2023    Reflux esophagitis     Urinary tract infection     Vaginal infection     Vaginal Pap smear 2014    Pap/Hpv Negative        Past Surgical History:   Procedure Laterality Date    APPENDECTOMY      BREAST SURGERY      Tags    CARPAL TUNNEL RELEASE Bilateral 2017    ,     CLOSURE, COLOSTOMY Left 2023    Procedure: CLOSURE, COLOSTOMY;  Surgeon: Manuel RUIZ  MD Concepcion;  Location: [facility] OR;  Service: General;  Laterality: Left;    COLONOSCOPY  04/2018    Normal  ( Juan A)     COLOSTOMY Right 1/16/2023    Procedure: CREATION, COLOSTOMY;  Surgeon: Manuel Javier MD;  Location: [facility] OR;  Service: General;  Laterality: Right;    CORONARY ANGIOGRAPHY N/A 2/21/2024    Procedure: ANGIOGRAM, CORONARY ARTERY;  Surgeon: Todd Carlisle MD;  Location: [facility] CATH LAB;  Service: Cardiology;  Laterality: N/A;    DILATION AND CURETTAGE OF UTERUS  1986    DUPUYTREN CONTRACTURE RELEASE Bilateral 09/2017    ESOPHAGOGASTRODUODENOSCOPY N/A 1/24/2024    Procedure: EGD (ESOPHAGOGASTRODUODENOSCOPY);  Surgeon: Yamilet Walters MD;  Location: [facility] ENDO (2ND FLR);  Service: Gastroenterology;  Laterality: N/A;    ESOPHAGOGASTRODUODENOSCOPY N/A 1/26/2024    Procedure: EGD (ESOPHAGOGASTRODUODENOSCOPY);  Surgeon: Yamilet Walters MD;  Location: [facility] ENDO (2ND FLR);  Service: Gastroenterology;  Laterality: N/A;    HYSTERECTOMY  1987    BEAU w/ appy, no BSO     IMPLANTATION OF LEADLESS PACEMAKER N/A 1/16/2024    Procedure: INSERTION, CARDIAC PACEMAKER, LEADLESS;  Surgeon: LENIN Frances MD;  Location: [facility] EP LAB;  Service: Cardiology;  Laterality: N/A;  SB, LEADLESS PPM (MICRA), MDT, ANES, EH, CVICU [id number]    INJECTION OF NEUROLYTIC AGENT AROUND LUMBAR SYMPATHETIC NERVE N/A 1/6/2022    Procedure: BLOCK, LUMBAR SYMPATHETIC;  Surgeon: Souleymane Rizo Jr., MD;  Location: [facility] PAIN MGT;  Service: Pain Management;  Laterality: N/A;  no pacemaker   pt is diabetic     INJECTION OF NEUROLYTIC AGENT AROUND LUMBAR SYMPATHETIC NERVE N/A 8/25/2022    Procedure: BLOCK, LUMBAR SYMPATHETIC;  Surgeon: Souleymane Rizo Jr., MD;  Location: [facility] PAIN MGT;  Service: Pain Management;  Laterality: N/A;  diabetic     INSERTION OF TUNNELED CENTRAL VENOUS HEMODIALYSIS CATHETER Right 1/25/2023    Procedure: INSERTION, CATHETER, HEMODIALYSIS, DUAL LUMEN;  Surgeon: Manuel Javier MD;  Location: [facility] OR;   Service: General;  Laterality: Right;    INSERTION, CATHETER, TUNNELED Left 1/28/2023    Procedure: Insertion,catheter,tunneled;  Surgeon: Watson Fontenot MD;  Location: [facility] OR;  Service: General;  Laterality: Left;    LAPAROTOMY, EXPLORATORY N/A 1/14/2023    Procedure: LAPAROTOMY, EXPLORATORY;  Surgeon: Manuel Javier MD;  Location: [facility] OR;  Service: General;  Laterality: N/A;    LAPAROTOMY, EXPLORATORY N/A 1/16/2023    Procedure: LAPAROTOMY, EXPLORATORY;  Surgeon: Manuel Javier MD;  Location: [facility] OR;  Service: General;  Laterality: N/A;    LEFT HEART CATHETERIZATION Left 2/21/2024    Procedure: Left heart cath;  Surgeon: Todd Carlisle MD;  Location: [facility] CATH LAB;  Service: Cardiology;  Laterality: Left;    LYMPHADENECTOMY Right 1/2/2024    Procedure: LYMPHADENECTOMY;  Surgeon: Tan Thomson MD;  Location: [facility] OR 2ND FLR;  Service: Cardiothoracic;  Laterality: Right;    PACEMAKER, TEMPORARY, TRANSVENOUS, PEDIATRIC Right 1/12/2024    Procedure: Pacemaker, Temporary, Transvenous;  Surgeon: Todd Carlisle MD;  Location: [facility] CATH LAB;  Service: Cardiology;  Laterality: Right;    REMOVAL OF CATHETER Right 1/28/2023    Procedure: REMOVAL, CATHETER;  Surgeon: Watson Fontenot MD;  Location: [facility] OR;  Service: General;  Laterality: Right;    REMOVAL, TUNNELED CATH Right 1/25/2023    Procedure: REMOVAL, TUNNELED CATH;  Surgeon: Manuel Javier MD;  Location: [facility] OR;  Service: General;  Laterality: Right;    ROBOTIC BRONCHOSCOPY N/A 10/20/2023    Procedure: ROBOTIC BRONCHOSCOPY;  Surgeon: Mayda Monzon MD;  Location: [facility] OR 2ND FLR;  Service: Pulmonary;  Laterality: N/A;    SHOULDER SURGERY  2009 & 2010    right rotator cuff    THORACOTOMY Right 1/2/2024    Procedure: THORACOTOMY;  Surgeon: Tan Thomson MD;  Location: [facility] OR 2ND FLR;  Service: Cardiothoracic;  Laterality: Right;    THORACOTOMY, WITH INITIAL THERAPEUTIC WEDGE RESECTION OF LUNG Right 1/2/2024    Procedure:  THORACOTOMY, WITH INITIAL THERAPEUTIC WEDGE RESECTION OF LUNG;  Surgeon: Tan Thomson MD;  Location: [facility] OR [address] [place] [address];  Service: Cardiothoracic;  Laterality: Right;    TONSILLECTOMY      UPPER GASTROINTESTINAL ENDOSCOPY  04/2018       Review of patient's allergies indicates:   Allergen Reactions    Crestor [rosuvastatin] Swelling    Gluten protein      Current Facility-Administered Medications   Medication Frequency    0.9%  NaCl infusion (for blood administration) Q24H PRN    acetaminophen tablet 1,000 mg Q8H PRN    albuterol inhaler 2 puff Q6H PRN    aluminum-magnesium hydroxide-simethicone 200-200-20 mg/5 mL suspension 30 mL QID PRN    amiodarone tablet 200 mg BID    aspirin EC tablet 81 mg Daily    atorvastatin tablet 40 mg QHS    benzonatate capsule 100 mg TID PRN    calcium-vitamin D3 500 mg-5 mcg (200 unit) per tablet 1 tablet Daily    carvediloL tablet 6.25 mg BID WM    dextromethorphan-guaiFENesin  mg per 12 hr tablet 1 tablet BID    dextrose 10% bolus 125 mL 125 mL PRN    dextrose 10% bolus 250 mL 250 mL PRN    dicyclomine capsule 10 mg TID    ferrous gluconate tablet 324 mg Daily with breakfast    glucagon (human recombinant) injection 1 mg PRN    glucose chewable tablet 16 g PRN    glucose chewable tablet 24 g PRN    levothyroxine tablet 88 mcg Before breakfast    meclizine tablet 25 mg TID PRN    melatonin tablet 6 mg Nightly PRN    naloxone 0.4 mg/mL injection 0.02 mg PRN    ondansetron injection 4 mg Q8H PRN    oxyCODONE immediate release tablet 5 mg Q6H PRN    pantoprazole EC tablet 40 mg BID AC    polyethylene glycol packet 17 g Daily    potassium chloride SA CR tablet 40 mEq TID    pregabalin capsule 75 mg Every other day    sevelamer carbonate tablet 1,600 mg TID WM    sodium chloride 0.9% flush 10 mL Q12H PRN    sodium phosphate 39.99 mmol in dextrose 5 % (D5W) 250 mL IVPB Once    valsartan split tablet 20 mg BID    venlafaxine tablet 37.5 mg Daily     Family History       Problem  Relation (Age of Onset)    Arthritis Father    Breast cancer Mother, Paternal Aunt    Cancer Mother, Paternal Aunt    Diabetes Paternal Grandfather    Hyperlipidemia Sister    Vision loss Father, Mother, Sister          Tobacco Use    Smoking status: Never    Smokeless tobacco: Never   Substance and Sexual Activity    Alcohol use: No    Drug use: Never    Sexual activity: Not Currently     Partners: Male     Birth control/protection: See Surgical Hx     Comment: :      Review of Systems   Constitutional: Negative.    Respiratory: Negative.     Cardiovascular: Negative.    Genitourinary: Negative.    Neurological: Negative.      Objective:     Vital Signs (Most Recent):  Temp: 98.2 °F (36.8 °C) (03/27/24 0747)  Pulse: 71 (03/27/24 1041)  Resp: 18 (03/27/24 0747)  BP: 133/60 (03/27/24 0747)  SpO2: 100 % (03/27/24 0747) Vital Signs (24h Range):  Temp:  [97.2 °F (36.2 °C)-98.2 °F (36.8 °C)] 98.2 °F (36.8 °C)  Pulse:  [67-78] 71  Resp:  [12-20] 18  SpO2:  [100 %] 100 %  BP: (111-213)/() 133/60     Weight: 54.4 kg (119 lb 14.9 oz) (03/26/24 2146)  Body mass index is 19.96 kg/m².  Body surface area is 1.58 meters squared.    I/O last 3 completed shifts:  In: 120 [P.O.:120]  Out: 0      Physical Exam  Pulmonary:      Effort: Pulmonary effort is normal. No respiratory distress.      Breath sounds: Rales present.   Musculoskeletal:      Right lower leg: No edema.      Left lower leg: No edema.   Skin:     General: Skin is warm.   Neurological:      General: No focal deficit present.      Mental Status: She is alert and oriented to person, place, and time.          Significant Labs:  BMP:   Recent Labs   Lab 03/27/24  0343   *      K 3.1*   CL 95   CO2 31*   BUN 25*   CREATININE 2.7*   CALCIUM 9.0   MG 1.9     CBC:   Recent Labs   Lab 03/27/24  0343   WBC 8.05   RBC 2.45*   HGB 6.9*   HCT 23.3*      MCV 95   MCH 28.2   MCHC 29.6*       Significant Imaging:  X-Ray: Reviewed  Assessment/Plan:      Cardiac/Vascular  Chronic combined systolic and diastolic heart failure  Per primary    Renal/  * Hypokalemia  Admitted with hypokaleima of 2.7  Received Potassium replacemenmt (oral and I/V)  Will keep her on 4K bath during dialysis    ESRD (end stage renal disease)  Outpatient ESRD Info;  Modality; HD  Access; RIJ TDC  Schedule; [facility]  Facility; [facility]  Nephrologist; Dr Vyas  UOP; Minimal  Last HD; 03/25/2024  EDW; ?    Plan/Recommendation;  -HD today, consent obtained.  -HgB 6.9, would need EPO  -F/U Iron studies, ferritin  -F/U PTH, Vit D, PO4  -PO4 < 1, would need phosphate replacement  -I/Os  -Renal diet    Oncology  Anemia in ESRD (end-stage renal disease)  See ESRD  Goal HgB in ESRD 10-12        Thank you for your consult. I will follow-up with patient. Please contact us if you have any additional questions.    Hanane Ortega MD  Nephrology  [facility] - Observation 11H

## 2024-03-27 NOTE — NURSING
Pt is AAOx4.Pt has sacrum to buttock clean  dry. No distress noted.States she unable to get out of bed without assistance. Call light in reach. Bed in low locked position.Side rails x2. Belongings at bedside. Pt free of fall and injuries Questions and concerns voiced and answered. Plan of care continues. Pt understands she will be going to dialysis today awaiting for them to call.

## 2024-03-27 NOTE — ASSESSMENT & PLAN NOTE
- suspect multifactorial in setting of poor po intake/nutritioin, CHF on torsemide, and also ESRD on HD MWF  - K+2.8 on presentation, improved to 3.1 on AM labs  - Continuing with PO K replacement  - Monitoring on daily labs  - Grossly asymptomatic from baseline  - Tele in place

## 2024-03-27 NOTE — NURSING
Nurses Note -- 4 Eyes      3/26/2024   7:04 PM      Skin assessed during: Transfer      [] No Altered Skin Integrity Present    []Prevention Measures Documented      [x] Yes- Altered Skin Integrity Present or Discovered   [] LDA Added if Not in Epic (Describe Wound)   [] New Altered Skin Integrity was Present on Admit and Documented in LDA   [x] Wound Image Taken    Wound Care Consulted? Yes    Attending Nurse:  Ricardo Verma RN/Staff Member:  ARABELLA Dahl

## 2024-03-27 NOTE — PLAN OF CARE
Inpatient Upgrade Note    Lily Green has warranted treatment spanning two or more midnights of hospital level care for the management of anemia and Hypokalemia  . She continues to require daily labs, monitoring of vital signs, further evaluation by consultants, and IV Electrolyte infusions, and Transfusion of PRBC's . Her condition is also complicated by the following comorbidities: Hypertension, Diabetes, Chronic kidney disease, Heart failure, and Malignancy.

## 2024-03-27 NOTE — PLAN OF CARE
Problem: Physical Therapy  Goal: Physical Therapy Goal  Description: Goals to met by 4/10/2024    1. Sit to stand transfer with Supervision  2. Bed to chair transfer with Stand-by Assistance using Rolling Walker  3. Gait  x 150 feet with Stand-by Assistance using Rolling Walker   4. Ascend/Descend 6 inch curb step with Stand-by Assistance using Rolling Walker.  5. Stand for 5 minutes with Stand-by Assistance using Rolling Walker  6. Lower extremity exercise program x15 reps per Instruction, with assistance as needed in order to facilitate improvement in functional independence    PT Eval: 3/27/2024

## 2024-03-27 NOTE — ASSESSMENT & PLAN NOTE
- Phos <1.0 on AM labs  - Replacing IV and PO  - Monitoring on daily labs   Terbinafine Counseling: Patient counseling regarding adverse effects of terbinafine including but not limited to headache, diarrhea, rash, upset stomach, liver function test abnormalities, itching, taste/smell disturbance, nausea, abdominal pain, and flatulence.  There is a rare possibility of liver failure that can occur when taking terbinafine.  The patient understands that a baseline LFT and kidney function test may be required. The patient verbalized understanding of the proper use and possible adverse effects of terbinafine.  All of the patient's questions and concerns were addressed.

## 2024-03-27 NOTE — SUBJECTIVE & OBJECTIVE
Past Medical History:   Diagnosis Date    Anxiety     Celiac disease 2018    Celiac disease     Depression     Diabetes mellitus     Family history of breast cancer in mother      at age 68    Hyperlipidemia     Hypertension     Meniere disease     Meniere's disease, unspecified ear     Menopause     Peptic ulcer     Peritonitis 2023    Reflux esophagitis     Urinary tract infection     Vaginal infection     Vaginal Pap smear 2014    Pap/Hpv Negative        Past Surgical History:   Procedure Laterality Date    APPENDECTOMY      BREAST SURGERY      Tags    CARPAL TUNNEL RELEASE Bilateral 2017    ,     CLOSURE, COLOSTOMY Left 2023    Procedure: CLOSURE, COLOSTOMY;  Surgeon: Manuel Javier MD;  Location: [facility] OR;  Service: General;  Laterality: Left;    COLONOSCOPY  2018    Normal  ( Juan A)     COLOSTOMY Right 2023    Procedure: CREATION, COLOSTOMY;  Surgeon: Manuel Javier MD;  Location: [facility] OR;  Service: General;  Laterality: Right;    CORONARY ANGIOGRAPHY N/A 2024    Procedure: ANGIOGRAM, CORONARY ARTERY;  Surgeon: Todd Carlisle MD;  Location: [facility] CATH LAB;  Service: Cardiology;  Laterality: N/A;    DILATION AND CURETTAGE OF UTERUS  1986    DUPUYTREN CONTRACTURE RELEASE Bilateral 2017    ESOPHAGOGASTRODUODENOSCOPY N/A 2024    Procedure: EGD (ESOPHAGOGASTRODUODENOSCOPY);  Surgeon: Yamilet Walters MD;  Location: [facility] ENDO (2ND FLR);  Service: Gastroenterology;  Laterality: N/A;    ESOPHAGOGASTRODUODENOSCOPY N/A 2024    Procedure: EGD (ESOPHAGOGASTRODUODENOSCOPY);  Surgeon: Yamilet Walters MD;  Location: [facility] ENDO (2ND FLR);  Service: Gastroenterology;  Laterality: N/A;    HYSTERECTOMY      BEAU w/ appy, no BSO     IMPLANTATION OF LEADLESS PACEMAKER N/A 2024    Procedure: INSERTION, CARDIAC PACEMAKER, LEADLESS;  Surgeon: LENIN Frances MD;  Location: [facility] EP LAB;  Service: Cardiology;  Laterality: N/A;  SB, LEADLESS PPM  SAM (MICRA), ANES, EH, CVICU [id number]    INJECTION OF NEUROLYTIC AGENT AROUND LUMBAR SYMPATHETIC NERVE N/A 1/6/2022    Procedure: BLOCK, LUMBAR SYMPATHETIC;  Surgeon: Souleymane Rizo Jr., MD;  Location: [facility] PAIN MGT;  Service: Pain Management;  Laterality: N/A;  no pacemaker   pt is diabetic     INJECTION OF NEUROLYTIC AGENT AROUND LUMBAR SYMPATHETIC NERVE N/A 8/25/2022    Procedure: BLOCK, LUMBAR SYMPATHETIC;  Surgeon: Souleymane Rizo Jr., MD;  Location: [facility] PAIN MGT;  Service: Pain Management;  Laterality: N/A;  diabetic     INSERTION OF TUNNELED CENTRAL VENOUS HEMODIALYSIS CATHETER Right 1/25/2023    Procedure: INSERTION, CATHETER, HEMODIALYSIS, DUAL LUMEN;  Surgeon: Manuel Javier MD;  Location: [facility] OR;  Service: General;  Laterality: Right;    INSERTION, CATHETER, TUNNELED Left 1/28/2023    Procedure: Insertion,catheter,tunneled;  Surgeon: Watson Fontenot MD;  Location: [facility] OR;  Service: General;  Laterality: Left;    LAPAROTOMY, EXPLORATORY N/A 1/14/2023    Procedure: LAPAROTOMY, EXPLORATORY;  Surgeon: Manuel Javier MD;  Location: [facility] OR;  Service: General;  Laterality: N/A;    LAPAROTOMY, EXPLORATORY N/A 1/16/2023    Procedure: LAPAROTOMY, EXPLORATORY;  Surgeon: Manuel Javier MD;  Location: [facility] OR;  Service: General;  Laterality: N/A;    LEFT HEART CATHETERIZATION Left 2/21/2024    Procedure: Left heart cath;  Surgeon: Todd Carlisle MD;  Location: [facility] CATH LAB;  Service: Cardiology;  Laterality: Left;    LYMPHADENECTOMY Right 1/2/2024    Procedure: LYMPHADENECTOMY;  Surgeon: Tan Thomson MD;  [address]  Service: Cardiothoracic;  Laterality: Right;    PACEMAKER, TEMPORARY, TRANSVENOUS, PEDIATRIC Right 1/12/2024    Procedure: Pacemaker, Temporary, Transvenous;  Surgeon: Todd Carlisle MD;  Location: [facility] CATH LAB;  Service: Cardiology;  Laterality: Right;    REMOVAL OF CATHETER Right 1/28/2023    Procedure: REMOVAL, CATHETER;  Surgeon: Watson CORNEJO  MD Corie;  Location: [facility] OR;  Service: General;  Laterality: Right;    REMOVAL, TUNNELED CATH Right 1/25/2023    Procedure: REMOVAL, TUNNELED CATH;  Surgeon: Manuel Javier MD;  Location: [facility] OR;  Service: General;  Laterality: Right;    ROBOTIC BRONCHOSCOPY N/A 10/20/2023    Procedure: ROBOTIC BRONCHOSCOPY;  Surgeon: Mayda Monzon MD;  Location: [facility] OR [facility] FLR;  Service: Pulmonary;  Laterality: N/A;    SHOULDER SURGERY  2009 & 2010    right rotator cuff    THORACOTOMY Right 1/2/2024    Procedure: THORACOTOMY;  Surgeon: Tan Thomson MD;  Location: [facility] OR 2ND FLR;  Service: Cardiothoracic;  Laterality: Right;    THORACOTOMY, WITH INITIAL THERAPEUTIC WEDGE RESECTION OF LUNG Right 1/2/2024    Procedure: THORACOTOMY, WITH INITIAL THERAPEUTIC WEDGE RESECTION OF LUNG;  Surgeon: Tan Thomson MD;  Location: [facility] OR [facility] FLR;  Service: Cardiothoracic;  Laterality: Right;    TONSILLECTOMY      UPPER GASTROINTESTINAL ENDOSCOPY  04/2018       Review of patient's allergies indicates:   Allergen Reactions    Crestor [rosuvastatin] Swelling    Gluten protein      Current Facility-Administered Medications   Medication Frequency    0.9%  NaCl infusion (for blood administration) Q24H PRN    acetaminophen tablet 1,000 mg Q8H PRN    albuterol inhaler 2 puff Q6H PRN    aluminum-magnesium hydroxide-simethicone 200-200-20 mg/5 mL suspension 30 mL QID PRN    amiodarone tablet 200 mg BID    aspirin EC tablet 81 mg Daily    atorvastatin tablet 40 mg QHS    benzonatate capsule 100 mg TID PRN    calcium-vitamin D3 500 mg-5 mcg (200 unit) per tablet 1 tablet Daily    carvediloL tablet 6.25 mg BID WM    dextromethorphan-guaiFENesin  mg per 12 hr tablet 1 tablet BID    dextrose 10% bolus 125 mL 125 mL PRN    dextrose 10% bolus 250 mL 250 mL PRN    dicyclomine capsule 10 mg TID    ferrous gluconate tablet 324 mg Daily with breakfast    glucagon (human recombinant) injection 1 mg PRN    glucose chewable tablet 16 g PRN     glucose chewable tablet 24 g PRN    levothyroxine tablet 88 mcg Before breakfast    meclizine tablet 25 mg TID PRN    melatonin tablet 6 mg Nightly PRN    naloxone 0.4 mg/mL injection 0.02 mg PRN    ondansetron injection 4 mg Q8H PRN    oxyCODONE immediate release tablet 5 mg Q6H PRN    pantoprazole EC tablet 40 mg BID AC    polyethylene glycol packet 17 g Daily    potassium chloride SA CR tablet 40 mEq TID    pregabalin capsule 75 mg Every other day    sevelamer carbonate tablet 1,600 mg TID WM    sodium chloride 0.9% flush 10 mL Q12H PRN    sodium phosphate 39.99 mmol in dextrose 5 % (D5W) 250 mL IVPB Once    valsartan split tablet 20 mg BID    venlafaxine tablet 37.5 mg Daily     Family History       Problem Relation (Age of Onset)    Arthritis Father    Breast cancer Mother, Paternal Aunt    Cancer Mother, Paternal Aunt    Diabetes Paternal Grandfather    Hyperlipidemia Sister    Vision loss Father, Mother, Sister          Tobacco Use    Smoking status: Never    Smokeless tobacco: Never   Substance and Sexual Activity    Alcohol use: No    Drug use: Never    Sexual activity: Not Currently     Partners: Male     Birth control/protection: See Surgical Hx     Comment: :      Review of Systems   Constitutional: Negative.    Respiratory: Negative.     Cardiovascular: Negative.    Genitourinary: Negative.    Neurological: Negative.      Objective:     Vital Signs (Most Recent):  Temp: 98.2 °F (36.8 °C) (03/27/24 0747)  Pulse: 71 (03/27/24 1041)  Resp: 18 (03/27/24 0747)  BP: 133/60 (03/27/24 0747)  SpO2: 100 % (03/27/24 0747) Vital Signs (24h Range):  Temp:  [97.2 °F (36.2 °C)-98.2 °F (36.8 °C)] 98.2 °F (36.8 °C)  Pulse:  [67-78] 71  Resp:  [12-20] 18  SpO2:  [100 %] 100 %  BP: (111-213)/() 133/60     Weight: 54.4 kg (119 lb 14.9 oz) (03/26/24 2146)  Body mass index is 19.96 kg/m².  Body surface area is 1.58 meters squared.    I/O last 3 completed shifts:  In: 120 [P.O.:120]  Out: 0      Physical  Exam  Pulmonary:      Effort: Pulmonary effort is normal. No respiratory distress.      Breath sounds: Rales present.   Musculoskeletal:      Right lower leg: No edema.      Left lower leg: No edema.   Skin:     General: Skin is warm.   Neurological:      General: No focal deficit present.      Mental Status: She is alert and oriented to person, place, and time.          Significant Labs:  BMP:   Recent Labs   Lab 03/27/24  0343   *      K 3.1*   CL 95   CO2 31*   BUN 25*   CREATININE 2.7*   CALCIUM 9.0   MG 1.9     CBC:   Recent Labs   Lab 03/27/24  0343   WBC 8.05   RBC 2.45*   HGB 6.9*   HCT 23.3*      MCV 95   MCH 28.2   MCHC 29.6*       Significant Imaging:  X-Ray: Reviewed

## 2024-03-27 NOTE — NURSING TRANSFER
Nursing Transfer Note          Pt arrived from the er via stretcher accompanied by transporter.arrived on Telemetry,a-fib,rate controlled.aaox4.resp even and non labored.Perma Cath intact to right chest and clamped. AV Fistula intact to lue with + thrill and bruit.o2 maintained at 2l nc with sats of 100%.vss.safety precautions implemented.bed in low position.rails up x3.call bell in reach.bed alarm in use for pt safety.barrier cream applied to sacrum/buttock wound.purewick re-applied.heels slightly red,barrier cream applied and heels floated on a pillow.will monitor.

## 2024-03-27 NOTE — PLAN OF CARE
[address] [facility] Observ[facility]  Initial Discharge Assessment       Primary Care Provider: Pavel Calixto MD    Admission Diagnosis: Hypokalemia [E87.6]  Anemia in ESRD (end-stage renal disease) [N18.6, D63.1]  Chest pain [R07.9]    Admission Date: 3/26/2024  Expected Discharge Date: 3/28/2024         Payor: HU[employer] MANAGED MEDICARE / Plan: HU[employer] MEDICARE PPO / Product Type: Medicare Advantage /     Extended Emergency Contact Information  Primary Emergency Contact: Deric Green   [place]  Mobile Phone: [phone]  Relation: Spouse  Secondary Emergency Contact: Sweetie Saha   [facility]  Home Phone: [phone]  Mobile Phone: [phone]  Relation: Sister    Discharge Plan A: Skilled Nursing Facility  Discharge Plan B: Skilled Nursing Facility      [id number] - [place], [place] - [address]  [address]  [place] [address]  Phone: [phone] Fax: [phone]    [id number] - [place], [place] - [address] AT [facility] [address]  [address]  [place] [address]  Phone: [phone] Fax: [phone]      Initial Assessment (most recent)       Adult Discharge Assessment - 03/27/24 1235          Discharge Assessment    Assessment Type Discharge Planning Assessment     Confirmed/corrected address, phone number and insurance Yes     Confirmed Demographics Correct on Facesheet     Source of Information patient     Reason For Admission Hypokalemia     People in Home alone     Facility Arrived From: [facility]     Do you expect to return to your current living situation? Yes     Prior to hospitilization cognitive status: Alert/Oriented     Current cognitive status: Alert/Oriented     Equipment Currently Used at Home none     Readmission within 30 days? Yes     Patient currently being followed by outpatient case management? No     Do you have prescription coverage? Yes     Do you have any problems affording any of your prescribed  medications? TBD     Are you on dialysis? Yes     Dialysis Name and Scheduled days [facility] / Tues, Thurs, Sat     Do you take coumadin? No     Discharge Plan A Skilled Nursing Facility     Discharge Plan B Skilled Nursing Facility     Discharge Plan discussed with: Spouse/sig other;Patient                     Readmission Assessment (most recent)       Readmission Assessment - 03/27/24 1241          Readmission    Why were you hospitalized in the last 30 days? Acute hypoxemic respiratory failure (P)      Why were you readmitted? New medical problem (P)      When you left the hospital where did you go? SNF (P)      Was this a planned readmission? No (P)                           SW completed Discharge Planning Assessment with patient and spouseDeric via bedside. Discharge planning booklet given to patient/family and whiteboard updated with CAMPBELL and phone #. All questions answered.    Patient will need assistance with transportation upon discharge.     Patient reported that she is currently at [facility] for SNF, and is expected to return once she is medically stable. Patient reported that prior to hospitalization she lived alone, and needed assistance with her ADL's. Patient reported that while she is at [facility] she is using a wheelchair; however, at home she does not use any assistive devices. Patient reported that she goes to [facility] on Tues, Thurs, and Sat for dialysis. Patient reported that she does not go to a Coumadin clinic.      Patient lives in a [facility] with 0 steps to enter.     Discharge Plan A and Plan B have been determined by review of patient's clinical status, future medical and therapeutic needs, and coverage/benefits for post-acute care in coordination with multidisciplinary team members.      Jaymie Mcgrath LMSW  [facility]  Ext. [phone]

## 2024-03-27 NOTE — PLAN OF CARE
03/27/24 1245   Post-Acute Status   Post-Acute Authorization Placement   Post-Acute Placement Status Referrals Sent     Pt is currently at [facility] for SNF and is expected to return once she is medically stable. [facility]    SW will continue to follow up.      Jaymie Mcgrath LMSW  [facility]  Ext. [phone]

## 2024-03-27 NOTE — PROGRESS NOTES
HD tx complete  2L removed over 3 hours. 1 unit of PRBC given during tx, tolerated well  Blood returned, both lumens flushed with NS, hep locked, capped and wrapped  Sterile dressing change performed to catheter site  Report given to primary nurse Christy  Transferred from unit via stretcher by transport back to room

## 2024-03-27 NOTE — PLAN OF CARE
Evaluation completed, plan of care established.    Problem: Occupational Therapy  Goal: Occupational Therapy Goal  Description: Goals to be met by: 4/27/24     Patient will increase functional independence with ADLs by performing:    UE Dressing with Set-up Assistance.  LE Dressing with Stand-by Assistance.  Grooming while standing at sink with Stand-by Assistance.  Toileting from toilet with Stand-by Assistance for hygiene and clothing management.   Supine to sit with Modified [place].  Step transfer with Stand-by Assistance  Toilet transfer to toilet with Stand-by Assistance.    Outcome: Ongoing, Progressing

## 2024-03-27 NOTE — PLAN OF CARE
Problem: Adult Inpatient Plan of Care  Goal: Plan of Care Review  Outcome: Ongoing, Progressing  Goal: Patient-Specific Goal (Individualized)  Outcome: Ongoing, Progressing  Goal: Absence of Hospital-Acquired Illness or Injury  Outcome: Ongoing, Progressing  Goal: Optimal Comfort and Wellbeing  Outcome: Ongoing, Progressing  Goal: Readiness for Transition of Care  Outcome: Ongoing, Progressing     Problem: Skin Injury Risk Increased  Goal: Skin Health and Integrity  Outcome: Ongoing, Progressing     Problem: Diabetes Comorbidity  Goal: Blood Glucose Level Within Targeted Range  Outcome: Ongoing, Progressing     Problem: Infection  Goal: Absence of Infection Signs and Symptoms  Outcome: Ongoing, Progressing     Problem: Impaired Wound Healing  Goal: Optimal Wound Healing  Outcome: Ongoing, Progressing     Problem: Heart Failure Comorbidity  Goal: Maintenance of Heart Failure Symptom Control  Outcome: Ongoing, Progressing     Problem: Hypertension Comorbidity  Goal: Blood Pressure in Desired Range  Outcome: Ongoing, Progressing     Problem: Dysrhythmia  Goal: Normalized Cardiac Rhythm  Outcome: Ongoing, Progressing     Problem: Adjustment to Illness (Chronic Kidney Disease)  Goal: Optimal Coping with Chronic Illness  Outcome: Ongoing, Progressing     Problem: Electrolyte Imbalance (Chronic Kidney Disease)  Goal: Electrolyte Balance  Outcome: Ongoing, Progressing     Problem: Fluid Volume Excess (Chronic Kidney Disease)  Goal: Fluid Balance  Outcome: Ongoing, Progressing     Problem: Functional Decline (Chronic Kidney Disease)  Goal: Optimal Functional Ability  Outcome: Ongoing, Progressing     Problem: Hematologic Alteration (Chronic Kidney Disease)  Goal: Absence of Anemia Signs and Symptoms  Outcome: Ongoing, Progressing     Problem: Oral Intake Inadequate (Chronic Kidney Disease)  Goal: Optimal Oral Intake  Outcome: Ongoing, Progressing     Problem: Pain (Chronic Kidney Disease)  Goal: Acceptable Pain  Control  Outcome: Ongoing, Progressing     Problem: Renal Function Impairment (Chronic Kidney Disease)  Goal: Minimize Renal Failure Effects  Outcome: Ongoing, Progressing     Problem: Device-Related Complication Risk (Hemodialysis)  Goal: Safe, Effective Therapy Delivery  Outcome: Ongoing, Progressing     Problem: Hemodynamic Instability (Hemodialysis)  Goal: Effective Tissue Perfusion  Outcome: Ongoing, Progressing     Problem: Infection (Hemodialysis)  Goal: Absence of Infection Signs and Symptoms  Outcome: Ongoing, Progressing     Problem: Electrolyte Imbalance  Goal: Electrolyte Balance  Outcome: Ongoing, Progressing     Problem: Fall Injury Risk  Goal: Absence of Fall and Fall-Related Injury  Outcome: Ongoing, Progressing

## 2024-03-27 NOTE — SUBJECTIVE & OBJECTIVE
Interval History: Pt seen and examined this morning on mackenzie MORGAN. States she overall feels very lethargic, though this has been her baseline for the past several weeks. Denies any new symptoms. Discussed plans for aggressive electrolyte replacement and pRBC transfusion before returning to her SNF. Care plan reviewed. Otherwise, doing well and with no further complaints at this time.      Objective:     Vital Signs (Most Recent):  Temp: 97.5 °F (36.4 °C) (03/27/24 1158)  Pulse: 65 (03/27/24 1158)  Resp: 16 (03/27/24 1158)  BP: (!) 105/54 (03/27/24 1158)  SpO2: 100 % (03/27/24 1158) Vital Signs (24h Range):  Temp:  [97.2 °F (36.2 °C)-98.2 °F (36.8 °C)] 97.5 °F (36.4 °C)  Pulse:  [65-78] 65  Resp:  [12-20] 16  SpO2:  [100 %] 100 %  BP: (105-213)/() 105/54     Weight: 54.4 kg (119 lb 14.9 oz)  Body mass index is 19.96 kg/m².    Intake/Output Summary (Last 24 hours) at 3/27/2024 1252  Last data filed at 3/27/2024 0631  Gross per 24 hour   Intake 120 ml   Output 0 ml   Net 120 ml         Physical Exam  Constitutional:       General: She is not in acute distress.     Appearance: She is not toxic-appearing or diaphoretic.      Comments: Chronically ill appearing, frail, underweight   HENT:      Head: Normocephalic and atraumatic.      Nose: Nose normal.      Mouth/Throat:      Mouth: Mucous membranes are dry.   Eyes:      General: No scleral icterus.     Extraocular Movements: Extraocular movements intact.      Pupils: Pupils are equal, round, and reactive to light.   Cardiovascular:      Rate and Rhythm: Normal rate. Rhythm irregular.   Pulmonary:      Effort: Pulmonary effort is normal. No respiratory distress.      Breath sounds: Rales present. No wheezing.   Abdominal:      General: Abdomen is flat. There is no distension.      Palpations: Abdomen is soft.      Tenderness: There is no abdominal tenderness. There is no guarding.   Musculoskeletal:         General: Normal range of motion.      Cervical back:  Normal range of motion and neck supple. No rigidity.      Right lower leg: Edema present.      Left lower leg: Edema present.   Skin:     General: Skin is warm and dry.   Neurological:      General: No focal deficit present.      Mental Status: She is alert and oriented to person, place, and time.   Psychiatric:         Mood and Affect: Mood normal.         Behavior: Behavior normal.         Significant Labs: All pertinent labs within the past 24 hours have been reviewed.    Significant Imaging: I have reviewed all pertinent imaging results/findings within the past 24 hours.

## 2024-03-27 NOTE — HPI
Patient presents to ED today for hypokalemia. She was sent from SNF facility where she has reportedly continued to still be slowly improving and benifiting, currently using walker/wheelchair for ambulation and  states able to walk 150ft with assistance. She was sent from SNF to Cardiology clinic to establish care. Found to be hypokalemic and sent to the ED for further management.     In the ED patient afebrile and hemodynamically stable saturating well on 2L at rest. Reports unchanged symptoms of generalized fatigue and weakness but reports eagerness to return to SNF to continue PT/OT once able/appropriate. K+ 2.8. She is on HD MWF. Still makes urine and takes torsemide BID. She was given dose of po Kcl.    Nephrology consulted for inpatient ESRD management.

## 2024-03-27 NOTE — HOSPITAL COURSE
Pt admitted to [facility] and remained stable overnight and into 3/27/2024. Aggressive potassium and phosphorus replacement provided. Restarted home calcium supplementation. 1u pRBCs provided for Hb 6.9, grossly asymptomatic. SW/CM assisting with discharge planning back to SNF when medically ready.  Hemoglobin improved to 9.5 after transfusion.  Patient hypokalemia improved and found to have hyperkalemia.  Patient underwent routine dialysis for management of hyperkalemia.  Patient discharged to skilled nursing facility.      Patient deemed appropriate for discharge. I personally saw, examined, and evaluated patient prior to departure. Plan discussed with patient, who was agreeable and amenable; medications were discussed and reviewed, outpatient follow-up scheduled, ER precautions were given, all questions were answered to the patient's satisfaction, and Lily Raisa Helen Justineusebia was subsequently discharged.

## 2024-03-27 NOTE — PROGRESS NOTES
Patient arrived on a stretcher to dialysis unit.   Report received from Christy Shane per dialysis Flowsheet.     Hemodialysis initiated using the following:     Dialysis Access: R subclavian perm-cath, tolerated well, flows good     Will Maintain telemetry and blood pressure monitoring throughout treatment.  Refer to dialysis flowsheet and MAR for details.

## 2024-03-28 PROBLEM — E87.5 HYPERKALEMIA: Status: ACTIVE | Noted: 2024-03-28

## 2024-03-28 LAB
ALBUMIN SERPL BCP-MCNC: 2 G/DL (ref 3.5–5.2)
ALBUMIN SERPL BCP-MCNC: 2 G/DL (ref 3.5–5.2)
ALP SERPL-CCNC: 100 U/L (ref 55–135)
ALT SERPL W/O P-5'-P-CCNC: 16 U/L (ref 10–44)
ANION GAP SERPL CALC-SCNC: 11 MMOL/L (ref 8–16)
ANION GAP SERPL CALC-SCNC: 7 MMOL/L (ref 8–16)
AST SERPL-CCNC: 27 U/L (ref 10–40)
BASOPHILS # BLD AUTO: 0.07 K/UL (ref 0–0.2)
BASOPHILS NFR BLD: 0.7 % (ref 0–1.9)
BILIRUB SERPL-MCNC: 0.4 MG/DL (ref 0.1–1)
BUN SERPL-MCNC: 15 MG/DL (ref 8–23)
BUN SERPL-MCNC: 26 MG/DL (ref 8–23)
CALCIUM SERPL-MCNC: 9.3 MG/DL (ref 8.7–10.5)
CALCIUM SERPL-MCNC: 9.4 MG/DL (ref 8.7–10.5)
CHLORIDE SERPL-SCNC: 103 MMOL/L (ref 95–110)
CHLORIDE SERPL-SCNC: 103 MMOL/L (ref 95–110)
CO2 SERPL-SCNC: 22 MMOL/L (ref 23–29)
CO2 SERPL-SCNC: 24 MMOL/L (ref 23–29)
CREAT SERPL-MCNC: 2.1 MG/DL (ref 0.5–1.4)
CREAT SERPL-MCNC: 2.6 MG/DL (ref 0.5–1.4)
DIFFERENTIAL METHOD BLD: ABNORMAL
EOSINOPHIL # BLD AUTO: 0.1 K/UL (ref 0–0.5)
EOSINOPHIL NFR BLD: 1.1 % (ref 0–8)
ERYTHROCYTE [DISTWIDTH] IN BLOOD BY AUTOMATED COUNT: 19.7 % (ref 11.5–14.5)
EST. GFR  (NO RACE VARIABLE): 18.9 ML/MIN/1.73 M^2
EST. GFR  (NO RACE VARIABLE): 24.4 ML/MIN/1.73 M^2
GLUCOSE SERPL-MCNC: 167 MG/DL (ref 70–110)
GLUCOSE SERPL-MCNC: 96 MG/DL (ref 70–110)
HCT VFR BLD AUTO: 32.2 % (ref 37–48.5)
HGB BLD-MCNC: 9.7 G/DL (ref 12–16)
IMM GRANULOCYTES # BLD AUTO: 0.17 K/UL (ref 0–0.04)
IMM GRANULOCYTES NFR BLD AUTO: 1.8 % (ref 0–0.5)
LYMPHOCYTES # BLD AUTO: 0.7 K/UL (ref 1–4.8)
LYMPHOCYTES NFR BLD: 7.2 % (ref 18–48)
MAGNESIUM SERPL-MCNC: 1.9 MG/DL (ref 1.6–2.6)
MCH RBC QN AUTO: 28.3 PG (ref 27–31)
MCHC RBC AUTO-ENTMCNC: 30.1 G/DL (ref 32–36)
MCV RBC AUTO: 94 FL (ref 82–98)
MONOCYTES # BLD AUTO: 1.3 K/UL (ref 0.3–1)
MONOCYTES NFR BLD: 13.6 % (ref 4–15)
NEUTROPHILS # BLD AUTO: 7.2 K/UL (ref 1.8–7.7)
NEUTROPHILS NFR BLD: 75.6 % (ref 38–73)
NRBC BLD-RTO: 0 /100 WBC
PHOSPHATE SERPL-MCNC: 1.4 MG/DL (ref 2.7–4.5)
PHOSPHATE SERPL-MCNC: 1.6 MG/DL (ref 2.7–4.5)
PLATELET # BLD AUTO: 305 K/UL (ref 150–450)
PMV BLD AUTO: 9.9 FL (ref 9.2–12.9)
POTASSIUM SERPL-SCNC: 5.3 MMOL/L (ref 3.5–5.1)
POTASSIUM SERPL-SCNC: 5.3 MMOL/L (ref 3.5–5.1)
PROT SERPL-MCNC: 7.3 G/DL (ref 6–8.4)
RBC # BLD AUTO: 3.43 M/UL (ref 4–5.4)
SODIUM SERPL-SCNC: 134 MMOL/L (ref 136–145)
SODIUM SERPL-SCNC: 136 MMOL/L (ref 136–145)
WBC # BLD AUTO: 9.48 K/UL (ref 3.9–12.7)

## 2024-03-28 PROCEDURE — 83735 ASSAY OF MAGNESIUM: CPT | Performed by: FAMILY MEDICINE

## 2024-03-28 PROCEDURE — 36415 COLL VENOUS BLD VENIPUNCTURE: CPT | Mod: XB | Performed by: STUDENT IN AN ORGANIZED HEALTH CARE EDUCATION/TRAINING PROGRAM

## 2024-03-28 PROCEDURE — 97110 THERAPEUTIC EXERCISES: CPT | Mod: CQ

## 2024-03-28 PROCEDURE — 97535 SELF CARE MNGMENT TRAINING: CPT

## 2024-03-28 PROCEDURE — 36415 COLL VENOUS BLD VENIPUNCTURE: CPT | Performed by: FAMILY MEDICINE

## 2024-03-28 PROCEDURE — 80053 COMPREHEN METABOLIC PANEL: CPT | Performed by: FAMILY MEDICINE

## 2024-03-28 PROCEDURE — 25000003 PHARM REV CODE 250: Performed by: STUDENT IN AN ORGANIZED HEALTH CARE EDUCATION/TRAINING PROGRAM

## 2024-03-28 PROCEDURE — 84100 ASSAY OF PHOSPHORUS: CPT | Performed by: FAMILY MEDICINE

## 2024-03-28 PROCEDURE — 97116 GAIT TRAINING THERAPY: CPT | Mod: CQ

## 2024-03-28 PROCEDURE — 21400001 HC TELEMETRY ROOM

## 2024-03-28 PROCEDURE — 80069 RENAL FUNCTION PANEL: CPT | Performed by: STUDENT IN AN ORGANIZED HEALTH CARE EDUCATION/TRAINING PROGRAM

## 2024-03-28 PROCEDURE — 25000003 PHARM REV CODE 250: Performed by: FAMILY MEDICINE

## 2024-03-28 PROCEDURE — 97110 THERAPEUTIC EXERCISES: CPT

## 2024-03-28 PROCEDURE — 85025 COMPLETE CBC W/AUTO DIFF WBC: CPT | Performed by: FAMILY MEDICINE

## 2024-03-28 RX ORDER — SODIUM CHLORIDE 9 MG/ML
INJECTION, SOLUTION INTRAVENOUS ONCE
Status: CANCELLED | OUTPATIENT
Start: 2024-03-29 | End: 2024-03-29

## 2024-03-28 RX ORDER — TORSEMIDE 20 MG/1
20 TABLET ORAL 2 TIMES DAILY
Status: DISCONTINUED | OUTPATIENT
Start: 2024-03-28 | End: 2024-03-29 | Stop reason: HOSPADM

## 2024-03-28 RX ADMIN — DICYCLOMINE HYDROCHLORIDE 10 MG: 10 CAPSULE ORAL at 04:03

## 2024-03-28 RX ADMIN — SODIUM ZIRCONIUM CYCLOSILICATE 10 G: 10 POWDER, FOR SUSPENSION ORAL at 09:03

## 2024-03-28 RX ADMIN — CARVEDILOL 6.25 MG: 6.25 TABLET, FILM COATED ORAL at 09:03

## 2024-03-28 RX ADMIN — Medication 1 TABLET: at 09:03

## 2024-03-28 RX ADMIN — AMIODARONE HYDROCHLORIDE 200 MG: 200 TABLET ORAL at 08:03

## 2024-03-28 RX ADMIN — AMIODARONE HYDROCHLORIDE 200 MG: 200 TABLET ORAL at 09:03

## 2024-03-28 RX ADMIN — TORSEMIDE 20 MG: 20 TABLET ORAL at 04:03

## 2024-03-28 RX ADMIN — ATORVASTATIN CALCIUM 40 MG: 40 TABLET, FILM COATED ORAL at 08:03

## 2024-03-28 RX ADMIN — CARVEDILOL 6.25 MG: 6.25 TABLET, FILM COATED ORAL at 04:03

## 2024-03-28 RX ADMIN — TORSEMIDE 20 MG: 20 TABLET ORAL at 09:03

## 2024-03-28 RX ADMIN — DICYCLOMINE HYDROCHLORIDE 10 MG: 10 CAPSULE ORAL at 09:03

## 2024-03-28 RX ADMIN — PANTOPRAZOLE SODIUM 40 MG: 40 TABLET, DELAYED RELEASE ORAL at 04:03

## 2024-03-28 RX ADMIN — PANTOPRAZOLE SODIUM 40 MG: 40 TABLET, DELAYED RELEASE ORAL at 05:03

## 2024-03-28 RX ADMIN — Medication 324 MG: at 09:03

## 2024-03-28 RX ADMIN — DICYCLOMINE HYDROCHLORIDE 10 MG: 10 CAPSULE ORAL at 08:03

## 2024-03-28 RX ADMIN — VENLAFAXINE 37.5 MG: 37.5 TABLET ORAL at 09:03

## 2024-03-28 RX ADMIN — ASPIRIN 81 MG: 81 TABLET, COATED ORAL at 09:03

## 2024-03-28 RX ADMIN — GUAIFENESIN AND DEXTROMETHORPHAN HYDROBROMIDE 1 TABLET: 600; 30 TABLET, EXTENDED RELEASE ORAL at 09:03

## 2024-03-28 RX ADMIN — GUAIFENESIN AND DEXTROMETHORPHAN HYDROBROMIDE 1 TABLET: 600; 30 TABLET, EXTENDED RELEASE ORAL at 08:03

## 2024-03-28 RX ADMIN — LEVOTHYROXINE SODIUM 88 MCG: 88 TABLET ORAL at 05:03

## 2024-03-28 RX ADMIN — VALSARTAN 20 MG: 40 TABLET, FILM COATED ORAL at 08:03

## 2024-03-28 RX ADMIN — ACETAMINOPHEN 1000 MG: 500 TABLET ORAL at 01:03

## 2024-03-28 NOTE — PT/OT/SLP PROGRESS
Occupational Therapy   Treatment    Name: Lily Green  MRN: [id number]  Admitting Diagnosis:  Hypokalemia       Recommendations:     Discharge Recommendations: Moderate Intensity Therapy  Discharge Equipment Recommendations:  walker, rolling, bedside commode  Barriers to discharge:   (increased (A) required)    Assessment:     Lily Green is a 73 y.o. female with a medical diagnosis of Hypokalemia.  She presents with good participation and motivation. Pt continues to be at high fall risk with decreased dynamic standing balance & endurance. Performance deficits affecting function are weakness, impaired endurance, impaired self care skills, impaired functional mobility, impaired balance, decreased upper extremity function, decreased lower extremity function, decreased safety awareness, impaired cardiopulmonary response to activity.     Rehab Prognosis:  Good; patient would benefit from acute skilled OT services to address these deficits and reach maximum level of function.       Plan:     Patient to be seen 4 x/week to address the above listed problems via self-care/home management, therapeutic activities, therapeutic exercises, neuromuscular re-education  Plan of Care Expires: 04/27/24  Plan of Care Reviewed with: patient, sibling    Subjective     Chief Complaint: weakness  Patient/Family Comments/goals: Pt reported that she needed to sit down to perform grooming.  Pain/Comfort:  Pain Rating 1: 0/10  Pain Rating Post-Intervention 1: 0/10    Objective:     Communicated with: RN prior to session.  Patient found up in chair with telemetry (sister present during session) upon OT entry to room.    General Precautions: Standard, fall, aspiration    Orthopedic Precautions:N/A  Braces: N/A  Respiratory Status: Room air     Occupational Performance:     Functional Mobility/Transfers:  Patient completed Sit <> Stand Transfer with minimum assistance  with  rolling walker from chair  Patient completed  Toilet Transfer Step Transfer technique with moderate assistance with  rolling walker  Functional Mobility: CGA using RW with functional mobility to bathroom & back during ADL's    Activities of Daily Living:  Grooming: stand by assistance while seated on toilet in bathroom (pt unable to complete in standing due to fatigue after walking to bathroom)  Upper Body Dressing: minimum assistance donning gown around back while seated in chair  Lower Body Dressing: stand by assistance donning socks seated in chair via figure 4 position      [facility] 6 Click ADL: 17    Treatment & Education:  Pt performed AROM exercises with BUE in 3 planes x 10 reps each while seated in chair.  Provided education regarding transfer techniques using RW.  Pt had no further questions & when asked whether there were any concerns pt reported none.      Patient left up in chair with all lines intact, call button in reach, RN notified, and sister present    GOALS:   Multidisciplinary Problems       Occupational Therapy Goals          Problem: Occupational Therapy    Goal Priority Disciplines Outcome Interventions   Occupational Therapy Goal     OT, PT/OT Ongoing, Progressing    Description: Goals to be met by: 4/27/24     Patient will increase functional independence with ADLs by performing:    UE Dressing with Set-up Assistance.  LE Dressing with Stand-by Assistance.  Grooming while standing at sink with Stand-by Assistance.  Toileting from toilet with Stand-by Assistance for hygiene and clothing management.   Supine to sit with Modified [place].  Step transfer with Stand-by Assistance  Toilet transfer to toilet with Stand-by Assistance.                         Time Tracking:     OT Date of Treatment: 03/28/24  OT Start Time: 1013  OT Stop Time: 1041  OT Total Time (min): 28 min    Billable Minutes:Self Care/Home Management 18  Therapeutic Exercise 10               3/28/2024

## 2024-03-28 NOTE — PLAN OF CARE
Problem: Occupational Therapy  Goal: Occupational Therapy Goal  Description: Goals to be met by: 4/27/24     Patient will increase functional independence with ADLs by performing:    UE Dressing with Set-up Assistance.  LE Dressing with Stand-by Assistance.  Grooming while standing at sink with Stand-by Assistance.  Toileting from toilet with Stand-by Assistance for hygiene and clothing management.   Supine to sit with Modified [place].  Step transfer with Stand-by Assistance  Toilet transfer to toilet with Stand-by Assistance.    Outcome: Ongoing, Progressing     Goals remain appropriate

## 2024-03-28 NOTE — PLAN OF CARE
[facility] updated on CAMPBELL of 3/29/24 and CM requested Auth be submitted. Updated PT/OT notes sent via [facility].   Will continue to update plan as needed.  Malick Walters RN,BSN

## 2024-03-28 NOTE — PT/OT/SLP PROGRESS
Physical Therapy Treatment    Patient Name:  Lily Green   MRN:  [id number]    Recommendations:     Discharge Recommendations: Moderate Intensity Therapy  Discharge Equipment Recommendations: walker, rolling  Barriers to discharge: None    Assessment:     Lily Green is a 73 y.o. female admitted with a medical diagnosis of Hypokalemia.  She presents with the following impairments/functional limitations: weakness, impaired endurance, impaired self care skills, impaired functional mobility, decreased safety awareness, decreased lower extremity function, decreased upper extremity function, impaired cardiopulmonary response to activity Pt tolerated treatment session well today. Pt was able to increase distance ambulated, seated rest break required between trials. Pt reported some SOB, which resolved with seated rest and pursed lip breathing. Patient remains appropriate for continued skilled services within the acute environment and goals remain appropriate.   .    Rehab Prognosis: Good; patient would benefit from acute skilled PT services to address these deficits and reach maximum level of function.    Recent Surgery: * No surgery found *      Plan:     During this hospitalization, patient to be seen 4 x/week to address the identified rehab impairments via gait training, therapeutic activities, therapeutic exercises, neuromuscular re-education and progress toward the following goals:    Plan of Care Expires:  04/27/24    Subjective     Chief Complaint: None stated   Patient/Family Comments/goals: Pt agreeable to PT   Pain/Comfort:  Pain Rating 1: 0/10      Objective:     Communicated with Rn prior to session.  Patient found supine with telemetry upon PT entry to room.     General Precautions: Standard, fall  Orthopedic Precautions: N/A  Braces: N/A  Respiratory Status: Room air     Functional Mobility:  Bed Mobility:     Supine to Sit: stand by assistance  Pt was able to sit EOB ~ 5 minutes with  SBA while performing LE exercises     Transfers:     Sit to Stand x 2:  contact guard assistance with rolling walker  Gait: 60 ft + 100 ft CGA with RW. (Chair follow)   Seated rest break required due to fatigue and minimal SOB which resolved with seated rest and pursed lip breathing.     Pt performed 10 repetitions of seated B LE exercises consisting of: Marching, LAQ, ABD/ADD, heel raises, and toe raises.         AM-PAC 6 CLICK MOBILITY  Turning over in bed (including adjusting bedclothes, sheets and blankets)?: 3  Sitting down on and standing up from a chair with arms (e.g., wheelchair, bedside commode, etc.): 3  Moving from lying on back to sitting on the side of the bed?: 3  Moving to and from a bed to a chair (including a wheelchair)?: 3  Need to walk in hospital room?: 3  Climbing 3-5 steps with a railing?: 2  Basic Mobility Total Score: 17       Treatment & Education:  Therapist provided instruction and educated for safety during transfers and gait training. As well as proper body mechanics, energy conservation, and fall prevention strategies during tasks listed above, and the effects of prolonged immobility and the importance of performing EOB/OOB activity and exercises to promote healing and reduce recovery time.       Patient left up in chair with all lines intact, call button in reach, and Rn notified..    GOALS:   Multidisciplinary Problems       Physical Therapy Goals          Problem: Physical Therapy    Goal Priority Disciplines Outcome Goal Variances Interventions   Physical Therapy Goal     PT, PT/OT Ongoing, Progressing     Description: Goals to met by 4/10/2024    1. Sit to stand transfer with Supervision  2. Bed to chair transfer with Stand-by Assistance using Rolling Walker  3. Gait  x 150 feet with Stand-by Assistance using Rolling Walker   4. Ascend/Descend 6 inch curb step with Stand-by Assistance using Rolling Walker.  5. Stand for 5 minutes with Stand-by Assistance using Rolling Walker  6.  Lower extremity exercise program x15 reps per Instruction, with assistance as needed in order to facilitate improvement in functional independence                       Time Tracking:     PT Received On: 03/28/24  PT Start Time: 0839     PT Stop Time: 0905  PT Total Time (min): 26 min     Billable Minutes: Gait Training 15 and Therapeutic Exercise 11    Treatment Type: Treatment  PT/PTA: PTA     Number of PTA visits since last PT visit: 1 03/28/2024

## 2024-03-28 NOTE — PROGRESS NOTES
[facility] [address] [place] [address] Medicine  Progress Note    Patient Name: Lily Green  MRN: [id number]  Patient Class: IP- Inpatient   Admission Date: 3/26/2024  Length of Stay: 1 days  Attending Physician: Butch Young MD  Primary Care Provider: Pavel Calixto MD        Subjective:     Principal Problem:Hypokalemia        HPI:  For extended Hx see below and recent Hospital and Progress Notes in EMR.  Breifly: Patient presents to ED today for hypokalemia. She was sent from SNF facility where she has reportedly continued to still be slowly improving and benifiting, currently using walker/wheelchair for ambulation and  states able to walk 150ft with assistance. She was sent from SNF to Cardiology clinic to establish care. Found to be hypokalemic and sent to the ED for further management.    In the ED patient afebrile and hemodynamically stable saturating well on 2L at rest. Reports unchanged symptoms of generalized fatigue and weakness but reports eagerness to return to SNF to continue PT/OT once able/appropriate. K+ 2.8. She is on HD MWF. Still makes urine and takes torsemide BID. She was given dose of po Kcl and admitted to the care of medicine for further evaluation and management.     Lily Green is a 73 year old white woman with celiac disease, diabetes mellitus type 2 with polyneuropathy, hypertension, hyperlipidemia, history of peptic ulcer disease, depression, anxiety, history of hysterectomy in 1987, history of peritonitis due to diffuse colonic ischemia status post total colectomy and end ileostomy creation on 1/14/2023, evacuation of intraabdominal hematoma, takedown of left lateral ileostomy and recreation of right lateral end ileostomy on 1/16/2023, elective robotic ileostomy reversal and ileocolonic anastomosis on 8/7/2023, irritable bowel syndrome, ESRD on HD MWF, right middle lobe carcinoid tumor status post robotic-assisted converted to right thoracotomy  with therapeutic wedge resection and mediastinal lymph node dissection and repair of bleeding right inferior pulmonary vein on 1/2/2024, atrial fibrillation, [employer]onic Micra AV leadless cardiac pacemaker placement on 1/16/2024.              She was hospitalized at [facility] from 1/2/2024 to 2/6/2024 for right middle lobe carcinoid tumor, parapneumonic effusion, atrial fibrillation with rapid ventricular response, tachy-eulogio syndrome, gastrointestinal bleeding while on heparin. She was discharged to [facility] nursing [facility].              She presented to [facility] Emergency Department on 2/12/2024 from the [facility] with increased work of breathing, dyspnea, and hypoxia. Iniitally admitted to MICU for emergent HD and resp failure. Found to have new cardiomyopathy and HFrEF. Managed for volume overload and also PNA. She was discharged to the skilled nursing facility on 3/1/2024.     Overview/Hospital Course:  [place] [facility] [place] remained stable overnight and into 3/27/2024. Aggressive potassium and phosphorus replacement provided. Restarted home calcium supplementation. 1u pRBCs provided for Hb 6.9, grossly asymptomatic. SW/CM assisting with discharge planning back to SNF when medically ready.    Interval History:   No events overnight. Continued dry cough and not other complaints. K 5.3 and Phos 1.6. Lokelma given x1 at 10mg. Hold sevelamer. Repeat phos in PM.        Review of Systems   Constitutional:  Positive for appetite change. Negative for chills, fatigue and fever.   HENT:  Negative for sore throat and trouble swallowing.    Eyes:  Negative for photophobia and visual disturbance.   Respiratory:  Positive for cough. Negative for shortness of breath and wheezing.    Cardiovascular:  Negative for chest pain, palpitations and leg swelling.   Gastrointestinal:  Negative for abdominal distention, abdominal pain, diarrhea,  nausea and vomiting.   Genitourinary:  Negative for dysuria and hematuria.   Musculoskeletal:  Negative for neck pain and neck stiffness.   Skin:  Negative for rash and wound.   Neurological:  Positive for weakness. Negative for seizures, syncope, light-headedness, numbness and headaches.   Psychiatric/Behavioral:  Negative for confusion and decreased concentration.      Objective:     Vital Signs (Most Recent):  Temp: 97.2 °F (36.2 °C) (03/28/24 1147)  Pulse: 75 (03/28/24 1441)  Resp: 16 (03/28/24 1147)  BP: 129/61 (03/28/24 1147)  SpO2: 97 % (03/28/24 1147) Vital Signs (24h Range):  Temp:  [97.2 °F (36.2 °C)-98.1 °F (36.7 °C)] 97.2 °F (36.2 °C)  Pulse:  [62-77] 75  Resp:  [16-24] 16  SpO2:  [97 %-100 %] 97 %  BP: ()/(50-75) 129/61     Weight: 54.4 kg (119 lb 14.9 oz)  Body mass index is 19.96 kg/m².    Intake/Output Summary (Last 24 hours) at 3/28/2024 1600  Last data filed at 3/27/2024 1820  Gross per 24 hour   Intake 1250 ml   Output 2900 ml   Net -1650 ml         Physical Exam  Constitutional:       General: She is not in acute distress.     Appearance: She is not toxic-appearing or diaphoretic.      Comments: Chronically ill appearing, frail, underweight   HENT:      Head: Normocephalic and atraumatic.      Nose: Nose normal.      Mouth/Throat:      Mouth: Mucous membranes are moist.   Eyes:      General: No scleral icterus.     Extraocular Movements: Extraocular movements intact.      Pupils: Pupils are equal, round, and reactive to light.   Cardiovascular:      Rate and Rhythm: Normal rate. Rhythm irregular.   Pulmonary:      Effort: Pulmonary effort is normal. No respiratory distress.      Breath sounds: No wheezing or rales.   Abdominal:      General: Abdomen is flat. There is no distension.      Palpations: Abdomen is soft.      Tenderness: There is no abdominal tenderness. There is no guarding.   Musculoskeletal:         General: Normal range of motion.      Cervical back: Normal range of motion  and neck supple. No rigidity.      Right lower leg: No edema.      Left lower leg: No edema.   Skin:     General: Skin is warm and dry.   Neurological:      General: No focal deficit present.      Mental Status: She is alert and oriented to person, place, and time.   Psychiatric:         Mood and Affect: Mood normal.         Behavior: Behavior normal.             Significant Labs: All pertinent labs within the past 24 hours have been reviewed.  CBC:   Recent Labs   Lab 03/27/24  0343 03/28/24  0547   WBC 8.05 9.48   HGB 6.9* 9.7*   HCT 23.3* 32.2*    305     CMP:   Recent Labs   Lab 03/27/24  0343 03/28/24  0547    136   K 3.1* 5.3*   CL 95 103   CO2 31* 22*   * 96   BUN 25* 15   CREATININE 2.7* 2.1*   CALCIUM 9.0 9.3   PROT 6.5 7.3   ALBUMIN 1.8* 2.0*   BILITOT 0.4 0.4   ALKPHOS 83 100   AST 26 27   ALT 15 16   ANIONGAP 10 11       Significant Imaging: I have reviewed all pertinent imaging results/findings within the past 24 hours.    Assessment/Plan:      * Hypokalemia  - Suspect multifactorial in setting of poor po intake/nutritioin, CHF on torsemide, and also ESRD on HD MWF  - K+2.8 on presentation, improved to 3.1 on AM labs  - s/p PO K replacement  - Monitoring on daily labs  - Grossly asymptomatic from baseline  - Telemetry in place  - Improved    Hyperkalemia  This patient has hyperkalemia which is controlled. We will monitor for arrhythmias with EKG or continuous telemetry. We will treat the hyperkalemia with Potassium Binders. The likely etiology of the hyperkalemia is ESRD.  The patients latest potassium has been reviewed and the results are listed below  Recent Labs   Lab 03/28/24  0547   K 5.3*     - In the setting of K replacement for hyponatremia     Hypothyroid  - TSH 19.9  - Increased home synthroid from 75mcg to 88mcg  - Repeat TSH in 6wks      Chronic combined systolic and diastolic heart failure  - Home torsemide   - Monitor tele  - Strict I/Os  - Daily weights  - Low sodium  fluid restricted renal gluten free diet  - Continue to closely monitor       Moderate malnutrition  - Novasource renal TID    Atrial fibrillation  - Continue home amiodarone, coreg  - Not on anticoagulation due to hx of GIB    ESRD (end stage renal disease)  - Nephrology consulted for HD management    Malignant carcinoid tumor of bronchus  - s/p resection      Debility  - PT/OT following  - SW/CM enlisted for assistance with discharge planning back to current SNF    Hypophosphatemia  - Phos <1.0 on AM labs 3/27  - Replacing IV and PO  - Monitoring on daily labs    Primary hypertension  - Continue home coreg, valsartan    Generalized anxiety disorder  - Continue home venlafaxine      Meniere's disease of both ears  - Home meclizine prn      Celiac disease  - Gluten free renal diet        VTE Risk Mitigation (From admission, onward)           Ordered     heparin (porcine) injection 1,000 Units  As needed (PRN)         03/27/24 1700     IP VTE HIGH RISK PATIENT  Once         03/26/24 1747     Place sequential compression device  Until discontinued         03/26/24 1747                    Discharge Planning   CAMPBELL: 3/29/2024     Code Status: Full Code   Is the patient medically ready for discharge?: No    Reason for patient still in hospital (select all that apply): Patient trending condition, Laboratory test, Treatment, Consult recommendations, and Pending disposition  Discharge Plan A: Skilled Nursing Facility                  Butch Young MD  Department of Hospital Medicine   [facility] 11[facility]

## 2024-03-28 NOTE — NURSING
Patient tachypneic. However, she sats 100% on 2L and 97% on Room air. Patient states that she feels fine, she gets anxious and agreed to remove O2 NC. She will call if she feels SOB. Patient does not wear O2 at home.

## 2024-03-28 NOTE — NURSING
Pt is AAOx4. Pt remain free from fall and injuries. VS remain stable. Questions and concerns voiced and answered. Medication compliance. Call light in reach. Bed in low locked position. Side rails x2. Belongings at bedside.

## 2024-03-28 NOTE — CARE UPDATE
Po4 is 1.6, with hold sevelamer, repeat PO4 in the afternoon, if its still decreasing we can giver her phosphate supplements.  Will do HD tomorrow.  Keep her on Lokelma 10g QD on non dialysis days if concerns for high potassium.

## 2024-03-28 NOTE — PLAN OF CARE
Problem: Adult Inpatient Plan of Care  Goal: Plan of Care Review  Outcome: Ongoing, Progressing  Goal: Patient-Specific Goal (Individualized)  Outcome: Ongoing, Progressing  Goal: Absence of Hospital-Acquired Illness or Injury  Outcome: Ongoing, Progressing  Goal: Optimal Comfort and Wellbeing  Outcome: Ongoing, Progressing  Goal: Readiness for Transition of Care  Outcome: Ongoing, Progressing     Problem: Skin Injury Risk Increased  Goal: Skin Health and Integrity  Outcome: Ongoing, Progressing     Problem: Diabetes Comorbidity  Goal: Blood Glucose Level Within Targeted Range  Outcome: Ongoing, Progressing     Problem: Infection  Goal: Absence of Infection Signs and Symptoms  Outcome: Ongoing, Progressing     Problem: Impaired Wound Healing  Goal: Optimal Wound Healing  Outcome: Ongoing, Progressing     Problem: Heart Failure Comorbidity  Goal: Maintenance of Heart Failure Symptom Control  Outcome: Ongoing, Progressing     Problem: Electrolyte Imbalance (Chronic Kidney Disease)  Goal: Electrolyte Balance  Outcome: Ongoing, Progressing     Problem: Fluid Volume Excess (Chronic Kidney Disease)  Goal: Fluid Balance  Outcome: Ongoing, Progressing  Pt progressing towards goals. No distress notice. No falls or injuries during shift. Bed in lowest position, call light within reach, belonging at bedside. Safety precaution maintain.Plan of Care reviewed. Pt verbalized understanding.

## 2024-03-28 NOTE — ASSESSMENT & PLAN NOTE
This patient has hyperkalemia which is controlled. We will monitor for arrhythmias with EKG or continuous telemetry. We will treat the hyperkalemia with Potassium Binders. The likely etiology of the hyperkalemia is ESRD.  The patients latest potassium has been reviewed and the results are listed below  Recent Labs   Lab 03/28/24  0547   K 5.3*     - In the setting of K replacement for hyponatremia

## 2024-03-28 NOTE — ASSESSMENT & PLAN NOTE
- Suspect multifactorial in setting of poor po intake/nutritioin, CHF on torsemide, and also ESRD on HD MWF  - K+2.8 on presentation, improved to 3.1 on AM labs  - s/p PO K replacement  - Monitoring on daily labs  - Grossly asymptomatic from baseline  - Telemetry in place  - Improved

## 2024-03-28 NOTE — ASSESSMENT & PLAN NOTE
- Home torsemide   - Monitor tele  - Strict I/Os  - Daily weights  - Low sodium fluid restricted renal gluten free diet  - Continue to closely monitor

## 2024-03-28 NOTE — NURSING
Pt is AAOx4. Pt stable no com[plaint of any, No distress noted. Call light in reach. Bed in low locked position.   Side rails x2. Belongings at bedside.   Pt free of fall and injuries Questions and concerns voiced and answered.    Plan of care continues.

## 2024-03-28 NOTE — SUBJECTIVE & OBJECTIVE
Interval History:   No events overnight. Continued dry cough and not other complaints. K 5.3 and Phos 1.6. Lokelma given x1 at 10mg. Hold sevelamer. Repeat phos in PM.        Review of Systems   Constitutional:  Positive for appetite change. Negative for chills, fatigue and fever.   HENT:  Negative for sore throat and trouble swallowing.    Eyes:  Negative for photophobia and visual disturbance.   Respiratory:  Positive for cough. Negative for shortness of breath and wheezing.    Cardiovascular:  Negative for chest pain, palpitations and leg swelling.   Gastrointestinal:  Negative for abdominal distention, abdominal pain, diarrhea, nausea and vomiting.   Genitourinary:  Negative for dysuria and hematuria.   Musculoskeletal:  Negative for neck pain and neck stiffness.   Skin:  Negative for rash and wound.   Neurological:  Positive for weakness. Negative for seizures, syncope, light-headedness, numbness and headaches.   Psychiatric/Behavioral:  Negative for confusion and decreased concentration.      Objective:     Vital Signs (Most Recent):  Temp: 97.2 °F (36.2 °C) (03/28/24 1147)  Pulse: 75 (03/28/24 1441)  Resp: 16 (03/28/24 1147)  BP: 129/61 (03/28/24 1147)  SpO2: 97 % (03/28/24 1147) Vital Signs (24h Range):  Temp:  [97.2 °F (36.2 °C)-98.1 °F (36.7 °C)] 97.2 °F (36.2 °C)  Pulse:  [62-77] 75  Resp:  [16-24] 16  SpO2:  [97 %-100 %] 97 %  BP: ()/(50-75) 129/61     Weight: 54.4 kg (119 lb 14.9 oz)  Body mass index is 19.96 kg/m².    Intake/Output Summary (Last 24 hours) at 3/28/2024 1600  Last data filed at 3/27/2024 1820  Gross per 24 hour   Intake 1250 ml   Output 2900 ml   Net -1650 ml         Physical Exam  Constitutional:       General: She is not in acute distress.     Appearance: She is not toxic-appearing or diaphoretic.      Comments: Chronically ill appearing, frail, underweight   HENT:      Head: Normocephalic and atraumatic.      Nose: Nose normal.      Mouth/Throat:      Mouth: Mucous membranes are  moist.   Eyes:      General: No scleral icterus.     Extraocular Movements: Extraocular movements intact.      Pupils: Pupils are equal, round, and reactive to light.   Cardiovascular:      Rate and Rhythm: Normal rate. Rhythm irregular.   Pulmonary:      Effort: Pulmonary effort is normal. No respiratory distress.      Breath sounds: No wheezing or rales.   Abdominal:      General: Abdomen is flat. There is no distension.      Palpations: Abdomen is soft.      Tenderness: There is no abdominal tenderness. There is no guarding.   Musculoskeletal:         General: Normal range of motion.      Cervical back: Normal range of motion and neck supple. No rigidity.      Right lower leg: No edema.      Left lower leg: No edema.   Skin:     General: Skin is warm and dry.   Neurological:      General: No focal deficit present.      Mental Status: She is alert and oriented to person, place, and time.   Psychiatric:         Mood and Affect: Mood normal.         Behavior: Behavior normal.             Significant Labs: All pertinent labs within the past 24 hours have been reviewed.  CBC:   Recent Labs   Lab 03/27/24  0343 03/28/24  0547   WBC 8.05 9.48   HGB 6.9* 9.7*   HCT 23.3* 32.2*    305     CMP:   Recent Labs   Lab 03/27/24  0343 03/28/24  0547    136   K 3.1* 5.3*   CL 95 103   CO2 31* 22*   * 96   BUN 25* 15   CREATININE 2.7* 2.1*   CALCIUM 9.0 9.3   PROT 6.5 7.3   ALBUMIN 1.8* 2.0*   BILITOT 0.4 0.4   ALKPHOS 83 100   AST 26 27   ALT 15 16   ANIONGAP 10 11       Significant Imaging: I have reviewed all pertinent imaging results/findings within the past 24 hours.

## 2024-03-29 VITALS
DIASTOLIC BLOOD PRESSURE: 57 MMHG | HEART RATE: 72 BPM | BODY MASS INDEX: 19.72 KG/M2 | HEIGHT: 65 IN | OXYGEN SATURATION: 97 % | TEMPERATURE: 98 F | SYSTOLIC BLOOD PRESSURE: 124 MMHG | RESPIRATION RATE: 18 BRPM | WEIGHT: 118.38 LBS

## 2024-03-29 LAB
ALBUMIN SERPL BCP-MCNC: 2 G/DL (ref 3.5–5.2)
ALP SERPL-CCNC: 103 U/L (ref 55–135)
ALT SERPL W/O P-5'-P-CCNC: 15 U/L (ref 10–44)
ANION GAP SERPL CALC-SCNC: 10 MMOL/L (ref 8–16)
AST SERPL-CCNC: 33 U/L (ref 10–40)
BASOPHILS # BLD AUTO: 0.05 K/UL (ref 0–0.2)
BASOPHILS NFR BLD: 0.5 % (ref 0–1.9)
BILIRUB SERPL-MCNC: 0.3 MG/DL (ref 0.1–1)
BUN SERPL-MCNC: 35 MG/DL (ref 8–23)
CALCIUM SERPL-MCNC: 9.3 MG/DL (ref 8.7–10.5)
CHLORIDE SERPL-SCNC: 99 MMOL/L (ref 95–110)
CO2 SERPL-SCNC: 22 MMOL/L (ref 23–29)
CREAT SERPL-MCNC: 2.9 MG/DL (ref 0.5–1.4)
DIFFERENTIAL METHOD BLD: ABNORMAL
EOSINOPHIL # BLD AUTO: 0.2 K/UL (ref 0–0.5)
EOSINOPHIL NFR BLD: 1.4 % (ref 0–8)
ERYTHROCYTE [DISTWIDTH] IN BLOOD BY AUTOMATED COUNT: 19.2 % (ref 11.5–14.5)
EST. GFR  (NO RACE VARIABLE): 16.6 ML/MIN/1.73 M^2
GLUCOSE SERPL-MCNC: 173 MG/DL (ref 70–110)
HCT VFR BLD AUTO: 31.3 % (ref 37–48.5)
HGB BLD-MCNC: 9.5 G/DL (ref 12–16)
IMM GRANULOCYTES # BLD AUTO: 0.21 K/UL (ref 0–0.04)
IMM GRANULOCYTES NFR BLD AUTO: 1.9 % (ref 0–0.5)
LYMPHOCYTES # BLD AUTO: 0.7 K/UL (ref 1–4.8)
LYMPHOCYTES NFR BLD: 6.1 % (ref 18–48)
MAGNESIUM SERPL-MCNC: 2 MG/DL (ref 1.6–2.6)
MCH RBC QN AUTO: 28.8 PG (ref 27–31)
MCHC RBC AUTO-ENTMCNC: 30.4 G/DL (ref 32–36)
MCV RBC AUTO: 95 FL (ref 82–98)
MONOCYTES # BLD AUTO: 1.5 K/UL (ref 0.3–1)
MONOCYTES NFR BLD: 13.9 % (ref 4–15)
NEUTROPHILS # BLD AUTO: 8.4 K/UL (ref 1.8–7.7)
NEUTROPHILS NFR BLD: 76.2 % (ref 38–73)
NRBC BLD-RTO: 0 /100 WBC
PHOSPHATE SERPL-MCNC: 1.3 MG/DL (ref 2.7–4.5)
PLATELET # BLD AUTO: 334 K/UL (ref 150–450)
PMV BLD AUTO: 10.3 FL (ref 9.2–12.9)
POTASSIUM SERPL-SCNC: 6 MMOL/L (ref 3.5–5.1)
PROT SERPL-MCNC: 6.9 G/DL (ref 6–8.4)
RBC # BLD AUTO: 3.3 M/UL (ref 4–5.4)
SODIUM SERPL-SCNC: 131 MMOL/L (ref 136–145)
WBC # BLD AUTO: 11.01 K/UL (ref 3.9–12.7)

## 2024-03-29 PROCEDURE — 84100 ASSAY OF PHOSPHORUS: CPT | Performed by: FAMILY MEDICINE

## 2024-03-29 PROCEDURE — 25000003 PHARM REV CODE 250: Performed by: FAMILY MEDICINE

## 2024-03-29 PROCEDURE — 94761 N-INVAS EAR/PLS OXIMETRY MLT: CPT

## 2024-03-29 PROCEDURE — 80100016 HC MAINTENANCE HEMODIALYSIS

## 2024-03-29 PROCEDURE — 25000003 PHARM REV CODE 250: Performed by: STUDENT IN AN ORGANIZED HEALTH CARE EDUCATION/TRAINING PROGRAM

## 2024-03-29 PROCEDURE — 63600175 PHARM REV CODE 636 W HCPCS

## 2024-03-29 PROCEDURE — 80053 COMPREHEN METABOLIC PANEL: CPT | Performed by: FAMILY MEDICINE

## 2024-03-29 PROCEDURE — 85025 COMPLETE CBC W/AUTO DIFF WBC: CPT | Performed by: FAMILY MEDICINE

## 2024-03-29 PROCEDURE — 36415 COLL VENOUS BLD VENIPUNCTURE: CPT | Performed by: FAMILY MEDICINE

## 2024-03-29 PROCEDURE — 83735 ASSAY OF MAGNESIUM: CPT | Performed by: FAMILY MEDICINE

## 2024-03-29 PROCEDURE — 90935 HEMODIALYSIS ONE EVALUATION: CPT | Mod: ,,, | Performed by: NURSE PRACTITIONER

## 2024-03-29 RX ORDER — LEVOTHYROXINE SODIUM 88 UG/1
88 TABLET ORAL
Qty: 30 TABLET | Refills: 11
Start: 2024-03-30 | End: 2024-04-16 | Stop reason: DRUGHIGH

## 2024-03-29 RX ADMIN — TORSEMIDE 20 MG: 20 TABLET ORAL at 11:03

## 2024-03-29 RX ADMIN — HEPARIN SODIUM 1000 UNITS: 1000 INJECTION, SOLUTION INTRAVENOUS; SUBCUTANEOUS at 11:03

## 2024-03-29 RX ADMIN — GUAIFENESIN AND DEXTROMETHORPHAN HYDROBROMIDE 1 TABLET: 600; 30 TABLET, EXTENDED RELEASE ORAL at 11:03

## 2024-03-29 RX ADMIN — PREGABALIN 75 MG: 75 CAPSULE ORAL at 11:03

## 2024-03-29 RX ADMIN — DICYCLOMINE HYDROCHLORIDE 10 MG: 10 CAPSULE ORAL at 11:03

## 2024-03-29 RX ADMIN — VENLAFAXINE 37.5 MG: 37.5 TABLET ORAL at 09:03

## 2024-03-29 RX ADMIN — Medication 324 MG: at 11:03

## 2024-03-29 RX ADMIN — Medication 6 MG: at 02:03

## 2024-03-29 RX ADMIN — Medication 1 TABLET: at 01:03

## 2024-03-29 RX ADMIN — PANTOPRAZOLE SODIUM 40 MG: 40 TABLET, DELAYED RELEASE ORAL at 05:03

## 2024-03-29 RX ADMIN — PANTOPRAZOLE SODIUM 40 MG: 40 TABLET, DELAYED RELEASE ORAL at 11:03

## 2024-03-29 RX ADMIN — BENZONATATE 100 MG: 100 CAPSULE ORAL at 11:03

## 2024-03-29 RX ADMIN — CARVEDILOL 6.25 MG: 6.25 TABLET, FILM COATED ORAL at 11:03

## 2024-03-29 RX ADMIN — VALSARTAN 20 MG: 40 TABLET, FILM COATED ORAL at 09:03

## 2024-03-29 RX ADMIN — LEVOTHYROXINE SODIUM 88 MCG: 88 TABLET ORAL at 05:03

## 2024-03-29 RX ADMIN — ASPIRIN 81 MG: 81 TABLET, COATED ORAL at 11:03

## 2024-03-29 RX ADMIN — AMIODARONE HYDROCHLORIDE 200 MG: 200 TABLET ORAL at 11:03

## 2024-03-29 NOTE — PLAN OF CARE
[facility]  Discharge Final Note    Primary Care Provider: Pavel Calixto MD    Expected Discharge Date: 3/29/2024    Patient cleared for discharge from case management standpoint.      Final Discharge Note (most recent)       Final Note - 03/29/24 1400          Final Note    Assessment Type Final Discharge Note     Anticipated Discharge Disposition Skilled Nursing Facility     What phone number can be called within the next 1-3 days to see how you are doing after discharge? [phone]     Hospital Resources/Appts/Education Provided Provided patient/caregiver with written discharge plan information        Post-Acute Status    Post-Acute Authorization Placement     Post-Acute Placement Status Set-up Complete/Auth obtained     Discharge Delays None known at this time                   Important Message from Medicare           Patient discharged back to skilled nursing facility, [facility].    Cindi Ovalles RN  Weekend  - [facility] [place]  Spectralink: [phone]

## 2024-03-29 NOTE — PLAN OF CARE
Update at 1:00 PM  Bedside nurse can call report to [phone] ask for [phone] nurse - patient's room# [id number]. CM will arranged stretcher transportation via [facility] and request pickup for 2:15 PM.      CM reviewed forwarded voice message. Mary at [facility] received auth and can admit patient today until 2:00 PM. Need to send SNF orders for review and setup stretcher transportation.       Cindi Ovalles RN  Weekend  - [facility] [place]  Spectralink: [phone]

## 2024-03-29 NOTE — PROGRESS NOTES
Arrived to the acute dialysis unit by stretcher. Ambulated with assistance to the bed. Maintenance dialysis started via right IJ tunneled CVC. Denies complaints. AAOx4, NAD.

## 2024-03-29 NOTE — PLAN OF CARE
[facility]    FACILITY TRANSFER ORDERS      Patient Name: Lily Green  YOB: 1950    PCP: Pavel Calixto MD   PCP Address: [address] [place] [address] SAUL / [place]FRAN[place]S  PCP Phone Number: [phone]  PCP Fax: [phone]    Encounter Date: 03/29/2024    Admit to: SNF    Vital Signs:  Routine    Diagnoses:   Active Hospital Problems    Diagnosis  POA    *Hypokalemia [E87.6]  Yes     Priority: 1 - High    Hyperkalemia [E87.5]  Yes     Priority: 4     Chronic combined systolic and diastolic heart failure [I50.42]  Yes    Hypothyroid [E03.9]  Yes    Moderate malnutrition [E44.0]  Yes    Atrial fibrillation [I48.91]  Yes     Chronic    ESRD (end stage renal disease) [N18.6]  Yes     Chronic    Malignant carcinoid tumor of bronchus [C7A.090]  Yes    Debility [R53.81]  Yes    Hypophosphatemia [E83.39]  Yes    Primary hypertension [I10]  Yes    Generalized anxiety disorder [F41.1]  Yes     Chronic    Meniere's disease of both ears [H81.03]  Yes    Celiac disease [K90.0]  Yes     Chronic      Resolved Hospital Problems   No resolved problems to display.       Allergies:  Review of patient's allergies indicates:   Allergen Reactions    Crestor [rosuvastatin] Swelling    Gluten protein        Diet: renal diet    Activities: Activity as tolerated    Goals of Care Treatment Preferences:  Code Status: Full Code          What is most important right now is to focus on extending life as long as possible, even it it means sacrificing quality, curative/life-prolongation (regardless of treatment burdens).  Accordingly, we have decided that the best plan to meet the patient's goals includes continuing with treatment.      Nursing: Per facility     Labs: BMP and Phos  Twice Weekly      CONSULTS:    Physical Therapy to evaluate and treat.  and Occupational Therapy to evaluate and treat.    MISCELLANEOUS CARE:  Diabetes Care:   SN to perform and educate Diabetic management with blood glucose  monitoring:, Fingerstick blood sugar a.m. and p.m., and Report CBG < 60 or > 350 to physician.    WOUND CARE ORDERS  None    Medications: Review discharge medications with patient and family and provide education.      Current Discharge Medication List        START taking these medications    Details   multivitamin with minerals tablet Take 1 tablet by mouth once daily.           CONTINUE these medications which have CHANGED    Details   levothyroxine (SYNTHROID) 88 MCG tablet Take 1 tablet (88 mcg total) by mouth before breakfast.  Qty: 30 tablet, Refills: 11           CONTINUE these medications which have NOT CHANGED    Details   amiodarone (PACERONE) 200 MG Tab Take 1 tablet (200 mg total) by mouth 2 (two) times daily.  Qty: 60 tablet, Refills: 11      aspirin (ECOTRIN) 81 MG EC tablet Take 1 tablet (81 mg total) by mouth once daily.  Refills: 0      atorvastatin (LIPITOR) 40 MG tablet Take 1 tablet (40 mg total) by mouth every evening.    Associated Diagnoses: Hyperlipidemia, mixed      B complex-vitamin C-folic acid (MELLISSA-BENJY) 0.8 mg Tab Take 1 tablet (0.8 mg total) by mouth once daily.  Qty: 30 tablet, Refills: 3      betahistine HCl (BETAHISTINE, BULK, MISC)       blood sugar diagnostic (TRUE METRIX GLUCOSE TEST STRIP) Strp USE ONE STRIP FOR TESTING 2 TIMES A DAY  Qty: 100 each, Refills: 11      blood-glucose meter kit Use to test twice a day  Qty: 1 each, Refills: 0    Comments: True Metrix Meter      calcium-vitamin D3 (OS-CIPRIANO 500 + D3) 500 mg-5 mcg (200 unit) per tablet Take 1 tablet by mouth once daily.  Qty: 30 tablet, Refills: 3      carvediloL (COREG) 6.25 MG tablet Take 1 tablet (6.25 mg total) by mouth 2 (two) times daily with meals.  Qty: 60 tablet, Refills: 11    Comments: .      dicyclomine (BENTYL) 10 MG capsule Take 10 mg by mouth 3 (three) times daily.      ferrous gluconate (FERGON) 324 MG tablet Take 1 tablet (324 mg total) by mouth daily with breakfast.  Qty: 30 tablet, Refills: 3     "  insulin aspart U-100 (NOVOLOG) 100 unit/mL (3 mL) InPn pen Inject before meals:  150-200=+1, 201-250=+2, 251-300=+3; 301-350=+4, over 350=+5 units  Qty: 15 mL, Refills: 0    Comments: Max daily dose 10 units  Associated Diagnoses: Type 2 diabetes mellitus with other kidney complication, unspecified whether long term insulin use      lancets Misc 1 lancet by Misc.(Non-Drug; Combo Route) route 4 (four) times daily.  Qty: 400 each, Refills: 3    Comments: TO USE WITH TRUE METRIX MACHINE  Associated Diagnoses: Type 2 diabetes mellitus with diabetic polyneuropathy, without long-term current use of insulin      loperamide (IMODIUM) 2 mg capsule Take 2 mg by mouth once as needed for Diarrhea.      meclizine (ANTIVERT) 25 mg tablet Take 1 tablet (25 mg total) by mouth 3 (three) times daily as needed for Dizziness.  Qty: 90 tablet, Refills: 3    Associated Diagnoses: Dizziness; Gastroesophageal reflux disease without esophagitis      nutritional supplements Liqd Take 237 mLs by mouth 4 (four) times daily.  Qty: 120 each, Refills: 6      ondansetron (ZOFRAN) 4 MG tablet Take 4 mg by mouth every 4 (four) hours as needed for Nausea.      pantoprazole (PROTONIX) 40 MG tablet Take 1 tablet (40 mg total) by mouth 2 (two) times daily before meals.  Qty: 100 tablet, Refills: 0      pen needle, diabetic (BD ULTRA-FINE MARIS PEN NEEDLE) 32 gauge x 5/32" Ndle 1 Device by Misc.(Non-Drug; Combo Route) route 4 (four) times daily with meals and nightly.  Qty: 400 each, Refills: 3      pregabalin (LYRICA) 75 MG capsule Take 1 capsule (75 mg total) by mouth every other day. Give after HD    Associated Diagnoses: Dizziness; Gastroesophageal reflux disease without esophagitis      torsemide (DEMADEX) 20 MG Tab Take 20 mg by mouth 2 (two) times daily.      valsartan (DIOVAN) 40 MG tablet Take 0.5 tablets (20 mg total) by mouth 2 (two) times daily.  Qty: 30 tablet, Refills: 11    Comments: .      venlafaxine (EFFEXOR) 37.5 MG Tab Take 1 tablet " (37.5 mg total) by mouth once daily.  Qty: 30 tablet, Refills: 11      vit C,D-Fg-vkobp-lutein-zeaxan (PRESERVISION AREDS 2) 401-419-21-1 mg-unit-mg-mg Cap            STOP taking these medications       midodrine (PROAMATINE) 10 MG tablet Comments:   Reason for Stopping:         sevelamer HCL (RENAGEL) 800 MG Tab Comments:   Reason for Stopping:                  Immunizations Administered as of 3/29/2024       Name Date Dose VIS Date Route Exp Date    COVID-19, MRNA, LN-S, PF (Pfizer) (Purple Cap) 3/6/2021 10:35 AM 0.3 mL 12/12/2020 Intramuscular 6/30/2021    Site: Left deltoid     Given By: Marcia Saenz LPN     : [employer]     Lot: [id number]     COVID-19, MRNA, LN-S, PF (Pfizer) (Purple Cap) 2/13/2021 11:01 AM 0.3 mL 12/12/2020 Intramuscular 6/30/2021    Site: Left deltoid     Given By: Gerri Choi CRNA     : [employer]     Lot: [id number]             This patient has had both covid vaccinations    Some patients may experience side effects after vaccination.  These may include fever, headache, muscle or joint aches.  Most symptoms resolve with 24-48 hours and do not require urgent medical evaluation unless they persist for more than 72 hours or symptoms are concerning for an unrelated medical condition.          _________________________________  Butch Young MD  03/29/2024

## 2024-03-29 NOTE — PROGRESS NOTES
Dialysis completed. Right IJ tunneled CVC flushed, heparinized, capped and ends wrapped with sterile gauze. Dialyzed for 3 hours with fluid removal of 2 liters. Tolerated well with stable vital signs. Returned to her room by wheelchair.

## 2024-03-29 NOTE — PROGRESS NOTES
[facility] H[facility] NOTE     Patient currently on hemodialysis for removal of uremic toxins and volume.     Patient seen and evaluated on hemodialysis, tolerating treatment, see HD flowsheet for vitals and assessments.    Labs have been reviewed and the dialysate bath has been adjusted.       Assessment/Plan:    -Patient seen on HD, tolerating treatment well, w/o complaints   -UF goal of 1-1.5L  -Renal diet, if not NPO   -Strict I/O's and daily weights  -Daily renal function panels  -Keep MAP >65 while on HD   -Hgb goal 10-11  -continue torsemide   -Hold phos binders, replete phos   -Will continue to follow while inpatient     Christy Gaytan DNP-FNP, C  Nephrology  Pager: [phone]

## 2024-03-29 NOTE — PLAN OF CARE
Problem: Diabetes Comorbidity  Goal: Blood Glucose Level Within Targeted Range  Outcome: Ongoing, Progressing     Problem: Infection  Goal: Absence of Infection Signs and Symptoms  Outcome: Ongoing, Progressing     Problem: Impaired Wound Healing  Goal: Optimal Wound Healing  Outcome: Ongoing, Progressing     Problem: Heart Failure Comorbidity  Goal: Maintenance of Heart Failure Symptom Control  Outcome: Ongoing, Progressing     Problem: Electrolyte Imbalance (Chronic Kidney Disease)  Goal: Electrolyte Balance  Outcome: Ongoing, Progressing     Problem: Fluid Volume Excess (Chronic Kidney Disease)  Goal: Fluid Balance  Outcome: Ongoing, Progressing   Pt discharged, AVS given,education completed. Pt verbalized understanding. All questions and concerns were met. Report called in to [facility] at facility pt left with EMS

## 2024-03-30 NOTE — DISCHARGE SUMMARY
[facility] [address] [place] [address] Medicine  Discharge Summary      Patient Name: Lily Green  MRN: [id number]  ANDER: [id number]  Patient Class: IP- Inpatient  Admission Date: 3/26/2024  Hospital Length of Stay: 2 days  Discharge Date and Time: 3/29/2024  2:40 PM  Attending Physician: Chelsea att. providers found   Discharging Provider: Butch Young MD  Primary Care Provider: Pavel Calixto MD  [facility] Medicine Team: [facility] MED G Butch Young MD  Primary Care Team: [facility] MED     HPI:   For extended Hx see below and recent Hospital and Progress Notes in EMR.  Breifly: Patient presents to ED today for hypokalemia. She was sent from SNF facility where she has reportedly continued to still be slowly improving and benifiting, currently using walker/wheelchair for ambulation and  states able to walk 150ft with assistance. She was sent from SNF to Cardiology clinic to establish care. Found to be hypokalemic and sent to the ED for further management.    In the ED patient afebrile and hemodynamically stable saturating well on 2L at rest. Reports unchanged symptoms of generalized fatigue and weakness but reports eagerness to return to SNF to continue PT/OT once able/appropriate. K+ 2.8. She is on HD MWF. Still makes urine and takes torsemide BID. She was given dose of po Kcl and admitted to the care of medicine for further evaluation and management.     Lily Green is a 73 year old white woman with celiac disease, diabetes mellitus type 2 with polyneuropathy, hypertension, hyperlipidemia, history of peptic ulcer disease, depression, anxiety, history of hysterectomy in 1987, history of peritonitis due to diffuse colonic ischemia status post total colectomy and end ileostomy creation on 1/14/2023, evacuation of intraabdominal hematoma, takedown of left lateral ileostomy and recreation of right lateral end ileostomy on 1/16/2023, elective robotic ileostomy reversal and  ileocolonic anastomosis on 8/7/2023, irritable bowel syndrome, ESRD on HD MWF, right middle lobe carcinoid tumor status post robotic-assisted converted to right thoracotomy with therapeutic wedge resection and mediastinal lymph node dissection and repair of bleeding right inferior pulmonary vein on 1/2/2024, atrial fibrillation, Medtronic Micra AV leadless cardiac pacemaker placement on 1/16/2024.              She was hospitalized at [facility] from 1/2/2024 to 2/6/2024 for right middle lobe carcinoid tumor, parapneumonic effusion, atrial fibrillation with rapid ventricular response, tachy-eulogio syndrome, gastrointestinal bleeding while on heparin. She was discharged to [facility] nursing [facility].              She presented to [facility] Emergency Department on 2/12/2024 from the [facility] nursing [facility] with increased work of breathing, dyspnea, and hypoxia. Iniitally admitted to MICU for emergent HD and resp failure. Found to have new cardiomyopathy and HFrEF. Managed for volume overload and also PNA. She was discharged to the skilled nursing facility on 3/1/2024.     * No surgery found *      Hospital Course:   Pt admitted to [facility] and remained stable overnight and into 3/27/2024. Aggressive potassium and phosphorus replacement provided. Restarted home calcium supplementation. 1u pRBCs provided for Hb 6.9, grossly asymptomatic. SW/CM assisting with discharge planning back to SNF when medically ready.  Hemoglobin improved to 9.5 after transfusion.  Patient hypokalemia improved and found to have hyperkalemia.  Patient underwent routine dialysis for management of hyperkalemia.  Patient discharged to skilled nursing facility.      Patient deemed appropriate for discharge. I personally saw, examined, and evaluated patient prior to departure. Plan discussed with patient, who was agreeable and amenable; medications were discussed and reviewed, outpatient  follow-up scheduled, ER precautions were given, all questions were answered to the patient's satisfaction, and Lily Green was subsequently discharged.       Goals of Care Treatment Preferences:  Code Status: Full Code          What is most important right now is to focus on extending life as long as possible, even it it means sacrificing quality, curative/life-prolongation (regardless of treatment burdens).  Accordingly, we have decided that the best plan to meet the patient's goals includes continuing with treatment.      Consults:   Consults (From admission, onward)          Status Ordering Provider     Inpatient consult to Nephrology  Once        Provider:  (Not yet assigned)    Completed LARRY MOORE            No new Assessment & Plan notes have been filed under this hospital service since the last note was generated.  Service: Hospital Medicine    Final Active Diagnoses:    Diagnosis Date Noted POA    PRINCIPAL PROBLEM:  Hypokalemia [E87.6] 03/26/2024 Yes    Hyperkalemia [E87.5] 03/28/2024 Yes    Chronic combined systolic and diastolic heart failure [I50.42] 03/26/2024 Yes    Hypothyroid [E03.9] 03/26/2024 Yes    Moderate malnutrition [E44.0] 02/13/2024 Yes    Atrial fibrillation [I48.91] 01/05/2024 Yes     Chronic    ESRD (end stage renal disease) [N18.6] 01/02/2024 Yes     Chronic    Malignant carcinoid tumor of bronchus [C7A.090] 11/24/2023 Yes    Debility [R53.81] 03/03/2023 Yes    Hypophosphatemia [E83.39] 01/30/2023 Yes    Primary hypertension [I10] 01/24/2023 Yes    Generalized anxiety disorder [F41.1] 06/02/2022 Yes     Chronic    Meniere's disease of both ears [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.03] 04/27/2021 Yes    Celiac disease [K90.0] 10/17/2019 Yes     Chronic      Problems Resolved During this Admission:       Discharged Condition: good    Disposition: Skilled Nursing Facility    Follow Up:    Patient Instructions:      Diet renal     Notify your health care provider if you experience any of the  following:  persistent dizziness, light-headedness, or visual disturbances     Notify your health care provider if you experience any of the following:  increased confusion or weakness     Notify your health care provider if you experience any of the following:  worsening rash     Notify your health care provider if you experience any of the following:  severe persistent headache     Notify your health care provider if you experience any of the following:  difficulty breathing or increased cough     Notify your health care provider if you experience any of the following:  redness, tenderness, or signs of infection (pain, swelling, redness, odor or green/yellow discharge around incision site)     Notify your health care provider if you experience any of the following:  severe uncontrolled pain     Notify your health care provider if you experience any of the following:  persistent nausea and vomiting or diarrhea     Notify your health care provider if you experience any of the following:  temperature >100.4     Activity as tolerated       Significant Diagnostic Studies: Labs: All labs within the past 24 hours have been reviewed    Pending Diagnostic Studies:       None           Medications:  Reconciled Home Medications:      Medication List        START taking these medications      multivitamin with minerals tablet  Take 1 tablet by mouth once daily.            CHANGE how you take these medications      levothyroxine 88 MCG tablet  Commonly known as: SYNTHROID  Take 1 tablet (88 mcg total) by mouth before breakfast.  What changed:   medication strength  how much to take            CONTINUE taking these medications      amiodarone 200 MG Tab  Commonly known as: PACERONE  Take 1 tablet (200 mg total) by mouth 2 (two) times daily.     aspirin 81 MG EC tablet  Commonly known as: ECOTRIN  Take 1 tablet (81 mg total) by mouth once daily.     atorvastatin 40 MG tablet  Commonly known as: LIPITOR  Take 1 tablet (40 mg  "total) by mouth every evening.     BETAHISTINE (BULK) MISC     blood sugar diagnostic Strp  Commonly known as: TRUE METRIX GLUCOSE TEST STRIP  USE ONE STRIP FOR TESTING 2 TIMES A DAY     blood-glucose meter kit  Use to test twice a day     calcium-vitamin D3 500 mg-5 mcg (200 unit) per tablet  Commonly known as: OS-CIPRIANO 500 + D3  Take 1 tablet by mouth once daily.     carvediloL 6.25 MG tablet  Commonly known as: COREG  Take 1 tablet (6.25 mg total) by mouth 2 (two) times daily with meals.     dicyclomine 10 MG capsule  Commonly known as: BENTYL  Take 10 mg by mouth 3 (three) times daily.     ferrous gluconate 324 MG tablet  Commonly known as: FERGON  Take 1 tablet (324 mg total) by mouth daily with breakfast.     insulin aspart U-100 100 unit/mL (3 mL) Inpn pen  Commonly known as: NovoLOG  Inject before meals:  150-200=+1, 201-250=+2, 251-300=+3; 301-350=+4, over 350=+5 units     lancets Misc  1 lancet by Misc.(Non-Drug; Combo Route) route 4 (four) times daily.     loperamide 2 mg capsule  Commonly known as: IMODIUM  Take 2 mg by mouth once as needed for Diarrhea.     meclizine 25 mg tablet  Commonly known as: ANTIVERT  Take 1 tablet (25 mg total) by mouth 3 (three) times daily as needed for Dizziness.     nutritional supplements Liqd  Take 237 mLs by mouth 4 (four) times daily.     ondansetron 4 MG tablet  Commonly known as: ZOFRAN  Take 4 mg by mouth every 4 (four) hours as needed for Nausea.     pantoprazole 40 MG tablet  Commonly known as: PROTONIX  Take 1 tablet (40 mg total) by mouth 2 (two) times daily before meals.     pen needle, diabetic 32 gauge x 5/32" Ndle  Commonly known as: BD ULTRA-FINE MARIS PEN NEEDLE  1 Device by Misc.(Non-Drug; Combo Route) route 4 (four) times daily with meals and nightly.     pregabalin 75 MG capsule  Commonly known as: LYRICA  Take 1 capsule (75 mg total) by mouth every other day. Give after HD     MELLISSA-BENJY 0.8 mg Tab  Generic drug: B complex-vitamin C-folic acid  Take 1 " tablet (0.8 mg total) by mouth once daily.     torsemide 20 MG Tab  Commonly known as: DEMADEX  Take 20 mg by mouth 2 (two) times daily.     valsartan 40 MG tablet  Commonly known as: DIOVAN  Take 0.5 tablets (20 mg total) by mouth 2 (two) times daily.     venlafaxine 37.5 MG Tab  Commonly known as: EFFEXOR  Take 1 tablet (37.5 mg total) by mouth once daily.     vit C,I-Ez-lmidq-lutein-zeaxan 250-90-40-1 mg Cap  Commonly known as: PRESERVISION AREDS 2            STOP taking these medications      midodrine 10 MG tablet  Commonly known as: PROAMATINE     sevelamer  MG Tab  Commonly known as: RENAGEL              Indwelling Lines/Drains at time of discharge:   Lines/Drains/Airways       Central Venous Catheter Line  Duration                  Hemodialysis Catheter right internal jugular -- days    Permacath right subclavian -- days              Drain  Duration                  Hemodialysis AV Fistula 09/01/23 0800 Left forearm 211 days                    Time spent on the discharge of patient: 35 minutes         Butch Young MD  Department of Hospital Medicine  [facility]

## 2024-04-16 ENCOUNTER — TELEPHONE (OUTPATIENT)
Dept: PRIMARY CARE CLINIC | Facility: CLINIC | Age: 74
End: 2024-04-16
Payer: MEDICARE

## 2024-04-16 DIAGNOSIS — E78.2 HYPERLIPIDEMIA, MIXED: ICD-10-CM

## 2024-04-16 DIAGNOSIS — R42 DIZZINESS: ICD-10-CM

## 2024-04-16 DIAGNOSIS — K21.9 GASTROESOPHAGEAL REFLUX DISEASE WITHOUT ESOPHAGITIS: ICD-10-CM

## 2024-04-16 LAB
ACID FAST MOD KINY STN SPEC: NORMAL
MYCOBACTERIUM SPEC QL CULT: NORMAL

## 2024-04-16 RX ORDER — AMIODARONE HYDROCHLORIDE 200 MG/1
200 TABLET ORAL 2 TIMES DAILY
Qty: 180 TABLET | Refills: 3 | Status: SHIPPED | OUTPATIENT
Start: 2024-04-16 | End: 2024-05-25

## 2024-04-16 RX ORDER — LEVOTHYROXINE SODIUM 112 UG/1
112 TABLET ORAL
Qty: 90 TABLET | Refills: 3 | Status: SHIPPED | OUTPATIENT
Start: 2024-04-16

## 2024-04-16 RX ORDER — MIDODRINE HYDROCHLORIDE 10 MG/1
10 TABLET ORAL 2 TIMES DAILY
COMMUNITY
Start: 2024-04-13

## 2024-04-16 RX ORDER — VENLAFAXINE 37.5 MG/1
37.5 TABLET ORAL DAILY
Qty: 90 TABLET | Refills: 3 | Status: SHIPPED | OUTPATIENT
Start: 2024-04-16 | End: 2025-04-16

## 2024-04-16 RX ORDER — LEVOTHYROXINE SODIUM 112 UG/1
TABLET ORAL
COMMUNITY
Start: 2024-04-09 | End: 2024-04-16 | Stop reason: SDUPTHER

## 2024-04-16 RX ORDER — ATORVASTATIN CALCIUM 40 MG/1
40 TABLET, FILM COATED ORAL NIGHTLY
Qty: 90 TABLET | Refills: 3 | Status: SHIPPED | OUTPATIENT
Start: 2024-04-16

## 2024-04-16 RX ORDER — PREGABALIN 75 MG/1
75 CAPSULE ORAL EVERY OTHER DAY
Qty: 45 CAPSULE | Refills: 2 | Status: SHIPPED | OUTPATIENT
Start: 2024-04-16

## 2024-04-16 RX ORDER — CARVEDILOL 6.25 MG/1
6.25 TABLET ORAL 2 TIMES DAILY WITH MEALS
Qty: 180 TABLET | Refills: 3 | Status: SHIPPED | OUTPATIENT
Start: 2024-04-16 | End: 2025-04-16

## 2024-04-16 NOTE — TELEPHONE ENCOUNTER
----- Message from Rose Flowers sent at 4/16/2024 11:19 AM CDT -----  Contact: Janki/[facility]/[phone]  Type:Home Health(orders,updates,clarifications,etc)    Home Health Agency/Nurse: Janki/Home health nurse    Phone number: [phone]    Reason for call:    Comments: patient was discharge and needs multiples refill/prescriptions ASAP.

## 2024-04-17 ENCOUNTER — TELEPHONE (OUTPATIENT)
Dept: PRIMARY CARE CLINIC | Facility: CLINIC | Age: 74
End: 2024-04-17
Payer: MEDICARE

## 2024-04-17 NOTE — TELEPHONE ENCOUNTER
Agnes a nurse form [employer] calling to speak with staff about a medication recommendation. Anticoagulant therapy is recommended for patients with Afib and a score of 2 or more using the [id number] tool. Patient reports that she has Afib. Please evaluate if the person has a confirmed diagnosis of Afib.       Is this something you want to start patient on or should they contact her Cards or not a candidate for it?

## 2024-04-17 NOTE — TELEPHONE ENCOUNTER
----- Message from Radames Rogel MA sent at 4/17/2024 12:10 PM CDT -----  Contact: Agnes @ [phone] ext: [phone]  Agnes a nurse form [employer] calling to speak with staff about a medication recommendation. Anticoagulant therapy is recommended for patients with Afib and a score of 2 or more using the [id number] tool. Patient reports that she has Afib. Please evaluate if the person has a confirmed diagnosis of Afib. If any questions or concerns please give them a call back at [phone] ext:[phone].      The patient is receiving iron during dialysis and they have no other details.

## 2024-04-19 ENCOUNTER — TELEPHONE (OUTPATIENT)
Dept: PRIMARY CARE CLINIC | Facility: CLINIC | Age: 74
End: 2024-04-19
Payer: MEDICARE

## 2024-04-19 NOTE — TELEPHONE ENCOUNTER
----- Message from Phyllis Munoz sent at 4/19/2024  9:33 AM CDT -----  Contact: Roberto Pool/Agnes/[phone] ext: [phone]  1MEDICALADVICE     Patient is calling for Medical Advice regarding:status check of a fax       Would like response via [employer]:  Would like a return call     Comments:Agnes said that she is calling in regards to needing to check the status of a fax that was sent to the office on 4/17 about a medication reconciliation. Please advise

## 2024-04-24 ENCOUNTER — TELEPHONE (OUTPATIENT)
Dept: PULMONOLOGY | Facility: CLINIC | Age: 74
End: 2024-04-24
Payer: MEDICARE

## 2024-04-24 NOTE — TELEPHONE ENCOUNTER
Spoke with pt, informed her that I have received her message. I also advised pt that I can schedule her appointment with Dr Monzon on 5/1/5/24 for 2:30pm. Pt accepted appointment.

## 2024-04-24 NOTE — TELEPHONE ENCOUNTER
----- Message from Marcellus Arteaga sent at 4/24/2024  9:18 AM CDT -----  Regarding: appt access  PATIENT CALL    Pt called to schedule EP appt. States that her HH nurse suggested she have a hospital f/u with her pulm provider. Please call back at [phone] to advise

## 2024-04-26 ENCOUNTER — OFFICE VISIT (OUTPATIENT)
Dept: PRIMARY CARE CLINIC | Facility: CLINIC | Age: 74
End: 2024-04-26
Payer: MEDICARE

## 2024-04-26 VITALS
BODY MASS INDEX: 19.43 KG/M2 | DIASTOLIC BLOOD PRESSURE: 56 MMHG | WEIGHT: 116.63 LBS | SYSTOLIC BLOOD PRESSURE: 104 MMHG | HEART RATE: 96 BPM | HEIGHT: 65 IN | OXYGEN SATURATION: 70 %

## 2024-04-26 DIAGNOSIS — Z99.2 TYPE 2 DIABETES MELLITUS WITH CHRONIC KIDNEY DISEASE ON CHRONIC DIALYSIS, WITH LONG-TERM CURRENT USE OF INSULIN: ICD-10-CM

## 2024-04-26 DIAGNOSIS — E11.22 TYPE 2 DIABETES MELLITUS WITH CHRONIC KIDNEY DISEASE ON CHRONIC DIALYSIS, WITH LONG-TERM CURRENT USE OF INSULIN: ICD-10-CM

## 2024-04-26 DIAGNOSIS — N18.6 TYPE 2 DIABETES MELLITUS WITH CHRONIC KIDNEY DISEASE ON CHRONIC DIALYSIS, WITH LONG-TERM CURRENT USE OF INSULIN: ICD-10-CM

## 2024-04-26 DIAGNOSIS — F33.0 MILD RECURRENT MAJOR DEPRESSION: ICD-10-CM

## 2024-04-26 DIAGNOSIS — Z93.2 ILEOSTOMY IN PLACE: ICD-10-CM

## 2024-04-26 DIAGNOSIS — D3A.090 CARCINOID TUMOR OF LUNG, UNSPECIFIED WHETHER MALIGNANT: ICD-10-CM

## 2024-04-26 DIAGNOSIS — Z79.4 TYPE 2 DIABETES MELLITUS WITH CHRONIC KIDNEY DISEASE ON CHRONIC DIALYSIS, WITH LONG-TERM CURRENT USE OF INSULIN: ICD-10-CM

## 2024-04-26 DIAGNOSIS — K51.20 ULCERATIVE PROCTITIS WITHOUT COMPLICATION: Primary | ICD-10-CM

## 2024-04-26 DIAGNOSIS — E11.42 TYPE 2 DIABETES MELLITUS WITH DIABETIC POLYNEUROPATHY, WITHOUT LONG-TERM CURRENT USE OF INSULIN: Chronic | ICD-10-CM

## 2024-04-26 DIAGNOSIS — I48.91 ATRIAL FIBRILLATION, UNSPECIFIED TYPE: Chronic | ICD-10-CM

## 2024-04-26 PROCEDURE — 3008F BODY MASS INDEX DOCD: CPT | Mod: CPTII,S$GLB,, | Performed by: INTERNAL MEDICINE

## 2024-04-26 PROCEDURE — 1159F MED LIST DOCD IN RCRD: CPT | Mod: CPTII,S$GLB,, | Performed by: INTERNAL MEDICINE

## 2024-04-26 PROCEDURE — 3066F NEPHROPATHY DOC TX: CPT | Mod: CPTII,S$GLB,, | Performed by: INTERNAL MEDICINE

## 2024-04-26 PROCEDURE — 3078F DIAST BP <80 MM HG: CPT | Mod: CPTII,S$GLB,, | Performed by: INTERNAL MEDICINE

## 2024-04-26 PROCEDURE — 99214 OFFICE O/P EST MOD 30 MIN: CPT | Mod: S$GLB,,, | Performed by: INTERNAL MEDICINE

## 2024-04-26 PROCEDURE — 99999 PR PBB SHADOW E&M-EST. PATIENT-LVL V: CPT | Mod: PBBFAC,,, | Performed by: INTERNAL MEDICINE

## 2024-04-26 PROCEDURE — 3074F SYST BP LT 130 MM HG: CPT | Mod: CPTII,S$GLB,, | Performed by: INTERNAL MEDICINE

## 2024-04-26 PROCEDURE — 1126F AMNT PAIN NOTED NONE PRSNT: CPT | Mod: CPTII,S$GLB,, | Performed by: INTERNAL MEDICINE

## 2024-04-26 PROCEDURE — 1111F DSCHRG MED/CURRENT MED MERGE: CPT | Mod: CPTII,S$GLB,, | Performed by: INTERNAL MEDICINE

## 2024-04-26 PROCEDURE — 4010F ACE/ARB THERAPY RXD/TAKEN: CPT | Mod: CPTII,S$GLB,, | Performed by: INTERNAL MEDICINE

## 2024-04-26 RX ORDER — TORSEMIDE 20 MG/1
20 TABLET ORAL 2 TIMES DAILY
Qty: 180 TABLET | Refills: 3 | Status: SHIPPED | OUTPATIENT
Start: 2024-04-26

## 2024-04-26 RX ORDER — ASPIRIN 81 MG/1
81 TABLET ORAL DAILY
Qty: 90 TABLET | Refills: 3 | Status: SHIPPED | OUTPATIENT
Start: 2024-04-26 | End: 2025-04-26

## 2024-04-26 RX ORDER — FERROUS GLUCONATE 324(38)MG
324 TABLET ORAL
Qty: 90 TABLET | Refills: 3 | Status: SHIPPED | OUTPATIENT
Start: 2024-04-26

## 2024-04-26 RX ORDER — PANTOPRAZOLE SODIUM 40 MG/1
40 TABLET, DELAYED RELEASE ORAL
Qty: 180 TABLET | Refills: 3 | Status: SHIPPED | OUTPATIENT
Start: 2024-04-26 | End: 2024-06-15

## 2024-04-26 NOTE — PATIENT INSTRUCTIONS
Future Appointments   Date Time Provider Department Center   5/14/2024 10:00 AM LAB, APPOINTMENT [place] [facility] LAB VNP [facility]   5/14/2024 11:30 AM Deion Cheng MD [facility] HEARTTX [facility]   5/15/2024  2:30 PM Mayda Monzon MD [facility] PULMSVC [facility]   5/17/2024 12:40 PM COORDINATED DEVICE CHECK [facility] ARRHPRO [facility]   5/17/2024  1:00 PM EKG, APPT [facility] EKG [facility]   5/17/2024  1:20 PM LENIN Frances MD [facility] ARRHYTH [facility]   5/20/2024  2:00 PM Sherine Rocha, AZUL [facility] ENDOCRN [facility]   5/29/2024  9:30 AM Pavel Calixto MD [facility] PRICRE [place]

## 2024-04-26 NOTE — PROGRESS NOTES
[facility] Note    Chief Complaint      Chief Complaint   Patient presents with    Follow-up     HFU       History of Present Illness      Lily Green is a 73 y.o. female with chronic conditions of DM2 with neuropathy, Carcinoid tumor of the lung, AFib/flutter, ESRD HD TThSat, HLD, GERD, celiac disease, Meniere's disease, depression, insomnia, chronic neck pain  who presents today for: hospital follow up.  In 1/2024, she was hospitalized for parapneumonic effusion, a fib with RVR and treated for pneumonia.  Discharged to SNF.  In 2/2024, hospitalized again for respiratory failure due to A fib and systolic heart failure and treated for pneumonia.  Discharged back to SNF.  On 3/26 she was placed in observation from SNF due to labs showing hypokalemia (K 2.7).  Potassium and phosphorus replaced and transfused pRBC.  Once potassium normalized, pt discharged back to SNF. Has appts with cardiology, EP, pulmonology and endocrinology within the month.  DM2 with neuropathy: Sees endo NP Sherine Rocha.  Previously controlled on metformin prior to ARF, transitioned to insulin.  Lantus 4u AM, 3u PM, novolog SSI.  A1C 4.9. Eye exam UTD with Dr. Rivera 12/2020.  Foot exam UTD with Dr. Bryan previously.  HLD: Controlled on lipitor.  LDL 64, stable from last check.    ESRD: Sees Dr. Vyas, nephrology.  HD T Th Sat. On midodrine for BP support during HD.  A fib: Sees EP cardiology, Dr. Woody.  On Amiodarone, carvedilol for rate control.  On aspirin for anticoagulation.  HFrEF: Sees cardiology, Dr. Umberto Thibodeaux.  On carvedilol, valsartan, torsemide.  GERD: Controlled on pantoprazole BID.  No new or worsening symptoms.  Depression: Controlled on effexor.  No new or worsening symptoms  Insomnia: Previously on temazepam.  Chronic neck pain 2/2 cervical degenerative disc disease: . No new exacerbations.  Off gabapentin 2/2 no perceived benefit.  Using combination of vitamin supplement and topical  neuropathy treatment for feet.  Had lumbar sympathetic nerve block which was effective for a few months.   Celiac disease, IBS: previously saw Dr. Amor.  Avoiding triggers to avoid exacerbations.  Doing well with diet control. Saw Dr. Hutton who started on dicyclomine which has helped.  Taking miralax.    Meniere's disease: Controlled  Flu shot missed last season.  TdAP 2015.  Prevnar UTD, pneumovax UTD.  Shingrix discussed.  COVID vaccine UTD, due for booster.  Mammogram deferred due to recent health developements.  DEXA , normal.  Cscope , Dr. Amor, no polyps, 5 yr interval.     Past Medical History:  Past Medical History:   Diagnosis Date    Anxiety     Celiac disease 2018    Celiac disease     Depression     Diabetes mellitus     Family history of breast cancer in mother      at age 68    Hyperlipidemia     Hypertension     Meniere disease     Meniere's disease, unspecified ear     Menopause     Peptic ulcer     Peritonitis 2023    Reflux esophagitis     Urinary tract infection     Vaginal infection     Vaginal Pap smear 2014    Pap/Hpv Negative        Past Surgical History:   has a past surgical history that includes Tonsillectomy; Appendectomy; Dilation and curettage of uterus (); Hysterectomy (); Carpal tunnel release (Bilateral, 2017); Shoulder surgery ( & ); Colonoscopy (2018); Upper gastrointestinal endoscopy (2018); Dupuytren contracture release (Bilateral, 2017); Breast surgery; Injection of neurolytic agent around lumbar sympathetic nerve (N/A, 2022); Injection of neurolytic agent around lumbar sympathetic nerve (N/A, 2022); laparotomy, exploratory (N/A, 2023); laparotomy, exploratory (N/A, 2023); closure, colostomy (Left, 2023); Colostomy (Right, 2023); Insertion of tunneled central venous hemodialysis catheter (Right, 2023); removal, tunneled cath (Right, 2023); insertion, catheter, tunneled  (Left, 1/28/2023); Removal of catheter (Right, 1/28/2023); robotic bronchoscopy (N/A, 10/20/2023); pacemaker, temporary, transvenous, pediatric (Right, 1/12/2024); Implantation of leadless pacemaker (N/A, 1/16/2024); Esophagogastroduodenoscopy (N/A, 1/24/2024); Esophagogastroduodenoscopy (N/A, 1/26/2024); lymphadenectomy (Right, 1/2/2024); thoracotomy (Right, 1/2/2024); thoracotomy, with initial therapeutic wedge resection of lung (Right, 1/2/2024); Coronary angiography (N/A, 2/21/2024); and Left heart catheterization (Left, 2/21/2024).    Family History:  family history includes Arthritis in her father; Breast cancer in her mother and paternal aunt; Cancer in her mother and paternal aunt; Diabetes in her paternal grandfather; Hyperlipidemia in her sister; Vision loss in her father, mother, and sister.     Social History:  Social History     Tobacco Use    Smoking status: Never    Smokeless tobacco: Never   Substance Use Topics    Alcohol use: No    Drug use: Never       I personally reviewed all past medical, surgical, social and family history.    Review of Systems   Constitutional:  Negative for chills, fever and malaise/fatigue.   Respiratory:  Positive for shortness of breath.    Cardiovascular:  Negative for chest pain.   Gastrointestinal:  Negative for constipation, diarrhea, nausea and vomiting.   Skin:  Negative for rash.   Neurological:  Negative for weakness.   All other systems reviewed and are negative.       Medications:  Outpatient Encounter Medications as of 4/26/2024   Medication Sig Note Dispense Refill    amiodarone (PACERONE) 200 MG Tab Take 1 tablet (200 mg total) by mouth 2 (two) times daily.  180 tablet 3    aspirin (ECOTRIN) 81 MG EC tablet Take 1 tablet (81 mg total) by mouth once daily.  90 tablet 3    atorvastatin (LIPITOR) 40 MG tablet Take 1 tablet (40 mg total) by mouth every evening.  90 tablet 3    B complex-vitamin C-folic acid (MELLISSA-BENJY) 0.8 mg Tab Take 1 tablet (0.8 mg total) by  "mouth once daily.  30 tablet 3    betahistine HCl (BETAHISTINE, BULK, MISC)  12/28/2023: Hold am of surgery      blood sugar diagnostic (TRUE METRIX GLUCOSE TEST STRIP) Strp USE ONE STRIP FOR TESTING 2 TIMES A DAY  100 each 11    blood-glucose meter kit Use to test twice a day  1 each 0    calcium-vitamin D3 (OS-CIPRIANO 500 + D3) 500 mg-5 mcg (200 unit) per tablet Take 1 tablet by mouth once daily.  30 tablet 3    carvediloL (COREG) 6.25 MG tablet Take 1 tablet (6.25 mg total) by mouth 2 (two) times daily with meals.  180 tablet 3    dicyclomine (BENTYL) 10 MG capsule Take 10 mg by mouth 3 (three) times daily.       ferrous gluconate (FERGON) 324 MG tablet Take 1 tablet (324 mg total) by mouth daily with breakfast.  90 tablet 3    insulin aspart U-100 (NOVOLOG) 100 unit/mL (3 mL) InPn pen Inject before meals:  150-200=+1, 201-250=+2, 251-300=+3; 301-350=+4, over 350=+5 units  15 mL 0    lancets Misc 1 lancet by Misc.(Non-Drug; Combo Route) route 4 (four) times daily.  400 each 3    levothyroxine (SYNTHROID) 112 MCG tablet Take 1 tablet (112 mcg total) by mouth before breakfast.  90 tablet 3    loperamide (IMODIUM) 2 mg capsule Take 2 mg by mouth once as needed for Diarrhea.       meclizine (ANTIVERT) 25 mg tablet Take 1 tablet (25 mg total) by mouth 3 (three) times daily as needed for Dizziness.  90 tablet 3    midodrine (PROAMATINE) 10 MG tablet 10 mg 2 (two) times a day.       multivitamin with minerals tablet Take 1 tablet by mouth once daily.       nutritional supplements Liqd Take 237 mLs by mouth 4 (four) times daily.  120 each 6    ondansetron (ZOFRAN) 4 MG tablet Take 4 mg by mouth every 4 (four) hours as needed for Nausea.       pantoprazole (PROTONIX) 40 MG tablet Take 1 tablet (40 mg total) by mouth 2 (two) times daily before meals.  180 tablet 3    pen needle, diabetic (BD ULTRA-FINE MARIS PEN NEEDLE) 32 gauge x 5/32" Ndle 1 Device by Misc.(Non-Drug; Combo Route) route 4 (four) times daily with meals and " nightly.  400 each 3    pregabalin (LYRICA) 75 MG capsule Take 1 capsule (75 mg total) by mouth every other day. Give after HD  45 capsule 2    torsemide (DEMADEX) 20 MG Tab Take 1 tablet (20 mg total) by mouth 2 (two) times daily.  180 tablet 3    valsartan (DIOVAN) 40 MG tablet Take 0.5 tablets (20 mg total) by mouth 2 (two) times daily.  30 tablet 11    venlafaxine (EFFEXOR) 37.5 MG Tab Take 1 tablet (37.5 mg total) by mouth once daily.  90 tablet 3    vit C,J-Qi-ycabb-lutein-zeaxan (PRESERVISION AREDS 2) [phone] mg-unit-mg-mg Cap        [DISCONTINUED] amiodarone (PACERONE) 200 MG Tab Take 1 tablet (200 mg total) by mouth 2 (two) times daily.  60 tablet 11    [DISCONTINUED] aspirin (ECOTRIN) 81 MG EC tablet Take 1 tablet (81 mg total) by mouth once daily.   0    [DISCONTINUED] atorvastatin (LIPITOR) 40 MG tablet Take 1 tablet (40 mg total) by mouth every evening.       [DISCONTINUED] carvediloL (COREG) 6.25 MG tablet Take 1 tablet (6.25 mg total) by mouth 2 (two) times daily with meals.  60 tablet 11    [DISCONTINUED] ferrous gluconate (FERGON) 324 MG tablet Take 1 tablet (324 mg total) by mouth daily with breakfast.  30 tablet 3    [DISCONTINUED] levothyroxine (SYNTHROID) 88 MCG tablet Take 1 tablet (88 mcg total) by mouth before breakfast.  30 tablet 11    [DISCONTINUED] pantoprazole (PROTONIX) 40 MG tablet Take 1 tablet (40 mg total) by mouth 2 (two) times daily before meals.  100 tablet 0    [DISCONTINUED] pregabalin (LYRICA) 75 MG capsule Take 1 capsule (75 mg total) by mouth every other day. Give after HD       [DISCONTINUED] torsemide (DEMADEX) 20 MG Tab Take 20 mg by mouth 2 (two) times daily.       [DISCONTINUED] venlafaxine (EFFEXOR) 37.5 MG Tab Take 1 tablet (37.5 mg total) by mouth once daily.  30 tablet 11     No facility-administered encounter medications on file as of 4/26/2024.       Allergies:  Review of patient's allergies indicates:   Allergen Reactions    Crestor [rosuvastatin] Swelling  "   Gluten protein        Health Maintenance:  Immunization History   Administered Date(s) Administered    COVID-19, MRNA, LN-S, PF (Pfizer) (Purple Cap) 02/13/2021, 03/06/2021    Influenza 11/30/2012    Influenza (FLUAD) - Quadrivalent - Adjuvanted - PF *Preferred* (65+) 10/03/2020    Influenza Split 10/15/2019    Pneumococcal Conjugate - 13 Valent 04/27/2018    Pneumococcal Polysaccharide - 23 Valent 10/14/2019      Health Maintenance   Topic Date Due    TETANUS VACCINE  Never done    Shingles Vaccine (1 of 2) Never done    Mammogram  12/28/2023    Hemoglobin A1c  05/15/2024    Foot Exam  05/31/2024    Eye Exam  12/13/2024    Lipid Panel  02/16/2025    DEXA Scan  01/03/2026    Hepatitis C Screening  Completed        Physical Exam      Vital Signs  Pulse: 96  SpO2: (!) 70 %  BP: (!) 104/56  BP Location: Right arm  Patient Position: Sitting  Pain Score: 0-No pain  Height and Weight  Height: 5' 5" (165.1 cm)  Weight: 52.9 kg (116 lb 10 oz)  BSA (Calculated - sq m): 1.56 sq meters  BMI (Calculated): 19.4  Weight in (lb) to have BMI = 25: 149.9]    Physical Exam  Vitals reviewed.   Constitutional:       Appearance: She is well-developed.   HENT:      Head: Normocephalic and atraumatic.      Right Ear: External ear normal.      Left Ear: External ear normal.   Cardiovascular:      Rate and Rhythm: Normal rate and regular rhythm.      Heart sounds: Normal heart sounds. No murmur heard.  Pulmonary:      Effort: Pulmonary effort is normal.      Breath sounds: Normal breath sounds. No wheezing or rales.   Abdominal:      General: Bowel sounds are normal. There is no distension.      Palpations: Abdomen is soft.      Tenderness: There is no abdominal tenderness.        Laboratory:  CBC:  Recent Labs   Lab 03/27/24  0343 03/28/24  0547 03/29/24  0443   WBC 8.05 9.48 11.01   RBC 2.45 L 3.43 L 3.30 L   Hemoglobin 6.9 L 9.7 L 9.5 L   Hematocrit 23.3 L 32.2 L 31.3 L   Platelets 300 305 334   MCV 95 94 95   MCH 28.2 28.3 28.8 "   MCHC 29.6 L 30.1 L 30.4 L     CMP:  Recent Labs   Lab 03/27/24  0343 03/28/24  0547 03/28/24  1658 03/29/24  0443   Glucose 135 H 96   < > 173 H   Calcium 9.0 9.3   < > 9.3   Albumin 1.8 L 2.0 L   < > 2.0 L   Total Protein 6.5 7.3  --  6.9   Sodium 136 136   < > 131 L   Potassium 3.1 L 5.3 H   < > 6.0 H   CO2 31 H 22 L   < > 22 L   Chloride 95 103   < > 99   BUN 25 H 15   < > 35 H   Alkaline Phosphatase 83 100  --  103   ALT 15 16  --  15   AST 26 27  --  33   Total Bilirubin 0.4 0.4  --  0.3    < > = values in this interval not displayed.     URINALYSIS:  Recent Labs   Lab 08/03/23  1055 09/27/23  0948   Color, UA Yellow Yellow   Specific Gravity, UA 1.015 1.010   pH, UA 6.0 >8.0 A   Protein, UA 3+ A 1+ A   Bacteria Many A None   Nitrite, UA Negative Negative   Leukocytes, UA 3+ A Negative   Urobilinogen, UA Negative  --    Hyaline Casts, UA 0 0      LIPIDS:  Recent Labs   Lab 11/01/22  0901 01/13/23  2348 01/14/23  1247 11/15/23  0802 02/12/24  0556 02/16/24  1553 03/26/24  1230   TSH  --  1.821  --   --  18.183 H  --  19.949 H   HDL 54  --   --  45  --  39 L  --    Cholesterol 149  --   --  150  --  125  --    Triglycerides 90  --  96 201 H  --  128  --    LDL Cholesterol 77.0  --   --  64.8  --  60.4 L  --    HDL/Cholesterol Ratio 36.2  --   --  30.0  --  31.2  --    Non-HDL Cholesterol 95  --   --  105  --  86  --    Total Cholesterol/HDL Ratio 2.8  --   --  3.3  --  3.2  --      TSH:  Recent Labs   Lab 01/13/23  2348 02/12/24  0556 03/26/24  1230   TSH 1.821 18.183 H 19.949 H     A1C:  Recent Labs   Lab 10/19/21  0907 04/22/22  0910 11/01/22  0901 07/11/23  0707 11/15/23  0802   Hemoglobin A1C 6.2 H 6.0 H 5.8 H 5.3 4.9       Assessment/Plan     Lily Raisa Green is a 73 y.o.female with:    1. Ulcerative proctitis without complication  2. Ileostomy in place  Continue current medications.  3. Mild recurrent major depression  Continue current medications.  4. Atrial fibrillation, unspecified type  -  aspirin (ECOTRIN) 81 MG EC tablet; Take 1 tablet (81 mg total) by mouth once daily.  Dispense: 90 tablet; Refill: 3  Continue current medications.  Follow up with Cardiology.  5. Carcinoid tumor of lung, unspecified whether malignant    6. Type 2 diabetes mellitus with diabetic polyneuropathy, without long-term current use of insulin  7. Type 2 diabetes mellitus with chronic kidney disease on chronic dialysis, with long-term current use of insulin  Continue current medications.  Follow up with endocrinology as scheduled.      Chronic conditions status updated as per HPI.  Other than changes above, cont current medications and maintain follow up with specialists.  Follow up in about 3 months (around 7/26/2024) for Follow up visit.    Future Appointments   Date Time Provider Department Center   5/14/2024 10:00 AM LAB, APPOINTMENT [facility] LAB [facility]   5/14/2024 11:30 AM Deion Cheng MD [facility] HEARTTX [facility]   5/15/2024  2:30 PM Mayda Monzon MD [facility] PULMSVC [facility]   5/17/2024 12:40 PM COORDINATED DEVICE CHECK [facility] ARRHPRO [facility]   5/17/2024  1:00 PM EKG, APPT [facility] EKG [facility]   5/17/2024  1:20 PM LENIN Frances MD [facility] ARRHYTH [facility]   5/20/2024  2:00 PM Sherine Rocha, AZUL [facility] ENDOCRN [facility]   8/14/2024  2:15 PM Pavel Calixto MD [facility]       Pavel Calixto MD  [facility]

## 2024-05-09 ENCOUNTER — PATIENT MESSAGE (OUTPATIENT)
Dept: ELECTROPHYSIOLOGY | Facility: CLINIC | Age: 74
End: 2024-05-09
Payer: MEDICARE

## 2024-05-13 ENCOUNTER — OFFICE VISIT (OUTPATIENT)
Dept: TRANSPLANT | Facility: CLINIC | Age: 74
End: 2024-05-13
Payer: MEDICARE

## 2024-05-13 VITALS
DIASTOLIC BLOOD PRESSURE: 59 MMHG | HEART RATE: 59 BPM | SYSTOLIC BLOOD PRESSURE: 113 MMHG | WEIGHT: 115.94 LBS | BODY MASS INDEX: 19.32 KG/M2 | HEIGHT: 65 IN

## 2024-05-13 DIAGNOSIS — I48.0 PAROXYSMAL ATRIAL FIBRILLATION: Chronic | ICD-10-CM

## 2024-05-13 DIAGNOSIS — E11.42 TYPE 2 DIABETES MELLITUS WITH DIABETIC POLYNEUROPATHY, WITHOUT LONG-TERM CURRENT USE OF INSULIN: Chronic | ICD-10-CM

## 2024-05-13 DIAGNOSIS — N18.6 ESRD (END STAGE RENAL DISEASE): Chronic | ICD-10-CM

## 2024-05-13 DIAGNOSIS — I50.9 ACUTE DECOMPENSATED HEART FAILURE: Primary | ICD-10-CM

## 2024-05-13 PROBLEM — I50.42 CHRONIC COMBINED SYSTOLIC AND DIASTOLIC HEART FAILURE: Status: RESOLVED | Noted: 2024-03-26 | Resolved: 2024-05-13

## 2024-05-13 PROBLEM — R00.1 BRADYCARDIA: Status: RESOLVED | Noted: 2024-01-12 | Resolved: 2024-05-13

## 2024-05-13 PROCEDURE — 99999 PR PBB SHADOW E&M-EST. PATIENT-LVL V: CPT | Mod: PBBFAC,,, | Performed by: INTERNAL MEDICINE

## 2024-05-13 PROCEDURE — 3074F SYST BP LT 130 MM HG: CPT | Mod: CPTII,S$GLB,, | Performed by: INTERNAL MEDICINE

## 2024-05-13 PROCEDURE — 1101F PT FALLS ASSESS-DOCD LE1/YR: CPT | Mod: CPTII,S$GLB,, | Performed by: INTERNAL MEDICINE

## 2024-05-13 PROCEDURE — 1159F MED LIST DOCD IN RCRD: CPT | Mod: CPTII,S$GLB,, | Performed by: INTERNAL MEDICINE

## 2024-05-13 PROCEDURE — 4010F ACE/ARB THERAPY RXD/TAKEN: CPT | Mod: CPTII,S$GLB,, | Performed by: INTERNAL MEDICINE

## 2024-05-13 PROCEDURE — 3008F BODY MASS INDEX DOCD: CPT | Mod: CPTII,S$GLB,, | Performed by: INTERNAL MEDICINE

## 2024-05-13 PROCEDURE — 99214 OFFICE O/P EST MOD 30 MIN: CPT | Mod: S$GLB,,, | Performed by: INTERNAL MEDICINE

## 2024-05-13 PROCEDURE — 3288F FALL RISK ASSESSMENT DOCD: CPT | Mod: CPTII,S$GLB,, | Performed by: INTERNAL MEDICINE

## 2024-05-13 PROCEDURE — 1126F AMNT PAIN NOTED NONE PRSNT: CPT | Mod: CPTII,S$GLB,, | Performed by: INTERNAL MEDICINE

## 2024-05-13 PROCEDURE — 3066F NEPHROPATHY DOC TX: CPT | Mod: CPTII,S$GLB,, | Performed by: INTERNAL MEDICINE

## 2024-05-13 PROCEDURE — 3078F DIAST BP <80 MM HG: CPT | Mod: CPTII,S$GLB,, | Performed by: INTERNAL MEDICINE

## 2024-05-13 RX ORDER — VIT B COMP NO.3/FOLIC/C/BIOTIN 1 MG-60 MG
1 TABLET ORAL
COMMUNITY
Start: 2024-04-20

## 2024-05-13 NOTE — ASSESSMENT & PLAN NOTE
As discussed please use the drops that were sent to your pharmacy.  Is also recommended you follow-up with the ear nose and throat doctor.      In addition, given your dental pain, please follow-up with a dentist.  You may call one of the numbers listed below to schedule an appointment.  If you HAVE A DENTIST, please call to make an appointment with them for follow-up as soon as possible.  If you DO NOT have a dentist, please call [phone]. It is a service that will help you find a dentist in your area and based on your insurance (if you have it or not) after you answer some questions    You may also follow-up with the [facility] if you don't have a density.   Dental Clinic - [facility]  [address] [place] [id number]  [phone]  They will see you with or without insurance and have walk in hours in the morning without appointments.     Other local dental clinics include:   DENTAL CLINICS    [facility]  [address] [address]  [phone]  Walk in house M-F 8a-noon  Emergency Dental Care    [facility]  [address], [address]nd [place] [address]  [phone]    Dr. Gunnar Shankar  [address] [place] [address]  Takes adults & children on a waiting list    [facility]  [address] [place] [address]  [phone]  Walk in scheduled only M-F 8a-noon & 1p-4p  Uninsured received 40% discount & payments  Payment must be made upfront before the service  Emergency Dental Care    [facility]  [address] [place] [address]  OR  [address] [place] [address]  [phone]    [facility]  Offices in [facility]  [phone]     Continue home med lopressor. No therapeutic anticoagulation for now given GIB during previous hospitalization

## 2024-05-13 NOTE — PATIENT INSTRUCTIONS
You have extra fluid on you.  Please adhere to a low sodium diet (no more than 1.5 grams of sodium in 24h).  3.   Follow fluid restriction of  2. no more than 1.5 liters in 24 hours..   4. You need more fluid removal through hemodialysis, in order to achieve and maintain your DRY weight. You will feel much better.  5. Please have echocardiogram done ASAP.  6.  F/u in 3 months with labs.

## 2024-05-14 ENCOUNTER — TELEPHONE (OUTPATIENT)
Dept: PULMONOLOGY | Facility: CLINIC | Age: 74
End: 2024-05-14
Payer: MEDICARE

## 2024-05-14 ENCOUNTER — HOSPITAL ENCOUNTER (OUTPATIENT)
Dept: PULMONOLOGY | Facility: CLINIC | Age: 74
Discharge: HOME | End: 2024-05-14
Payer: MEDICARE

## 2024-05-14 VITALS — WEIGHT: 123 LBS | HEIGHT: 65 IN | BODY MASS INDEX: 20.49 KG/M2

## 2024-05-14 DIAGNOSIS — J44.9 CHRONIC OBSTRUCTIVE PULMONARY DISEASE, UNSPECIFIED COPD TYPE: Primary | ICD-10-CM

## 2024-05-14 DIAGNOSIS — J44.9 CHRONIC OBSTRUCTIVE PULMONARY DISEASE, UNSPECIFIED COPD TYPE: ICD-10-CM

## 2024-05-14 PROCEDURE — 94618 PULMONARY STRESS TESTING: CPT | Mod: S$GLB,,, | Performed by: INTERNAL MEDICINE

## 2024-05-14 NOTE — TELEPHONE ENCOUNTER
Spoke w/ pt about scheduling a 6 min walk before her appt per  on 05/15/24 at 2:30pm.Pt stated that she would come in today at 1:30pm to do her walk. I went over the location and her next steps after appt tomorrow pt verbalized understanding. 5/14/24 BC

## 2024-05-14 NOTE — TELEPHONE ENCOUNTER
----- Message from Mayda Monzon MD sent at 5/14/2024  4:52 AM CDT -----  Regarding: walk before  GM,  Needs walk before clinic visit.  Mayda

## 2024-05-15 ENCOUNTER — OFFICE VISIT (OUTPATIENT)
Dept: PULMONOLOGY | Facility: CLINIC | Age: 74
End: 2024-05-15
Payer: MEDICARE

## 2024-05-15 ENCOUNTER — TELEPHONE (OUTPATIENT)
Dept: PULMONOLOGY | Facility: CLINIC | Age: 74
End: 2024-05-15

## 2024-05-15 ENCOUNTER — HOSPITAL ENCOUNTER (OUTPATIENT)
Dept: CARDIOLOGY | Facility: HOSPITAL | Age: 74
Discharge: HOME | End: 2024-05-15
Attending: INTERNAL MEDICINE
Payer: MEDICARE

## 2024-05-15 VITALS
HEART RATE: 57 BPM | DIASTOLIC BLOOD PRESSURE: 56 MMHG | SYSTOLIC BLOOD PRESSURE: 122 MMHG | WEIGHT: 115.94 LBS | OXYGEN SATURATION: 91 % | BODY MASS INDEX: 19.32 KG/M2 | HEIGHT: 65 IN

## 2024-05-15 VITALS
HEART RATE: 65 BPM | BODY MASS INDEX: 20.49 KG/M2 | SYSTOLIC BLOOD PRESSURE: 114 MMHG | DIASTOLIC BLOOD PRESSURE: 60 MMHG | HEIGHT: 65 IN | WEIGHT: 123 LBS

## 2024-05-15 DIAGNOSIS — Z78.9 ON SUPPLEMENTAL OXYGEN BY NASAL CANNULA: ICD-10-CM

## 2024-05-15 DIAGNOSIS — J90 PLEURAL EFFUSION ON LEFT: ICD-10-CM

## 2024-05-15 DIAGNOSIS — R05.8 UPPER AIRWAY COUGH SYNDROME: Primary | ICD-10-CM

## 2024-05-15 DIAGNOSIS — R09.82 POST-NASAL DRIP: ICD-10-CM

## 2024-05-15 DIAGNOSIS — J40 BRONCHITIS: ICD-10-CM

## 2024-05-15 DIAGNOSIS — J44.9 CHRONIC OBSTRUCTIVE PULMONARY DISEASE, UNSPECIFIED COPD TYPE: ICD-10-CM

## 2024-05-15 DIAGNOSIS — I50.9 ACUTE DECOMPENSATED HEART FAILURE: ICD-10-CM

## 2024-05-15 PROBLEM — J18.9 LEFT LOWER LOBE PNEUMONIA: Status: RESOLVED | Noted: 2024-02-29 | Resolved: 2024-05-15

## 2024-05-15 LAB
ASCENDING AORTA: 3.28 CM
AV INDEX (PROSTH): 0.6
AV MEAN GRADIENT: 6 MMHG
AV PEAK GRADIENT: 11 MMHG
AV VALVE AREA BY VELOCITY RATIO: 2.5 CM²
AV VALVE AREA: 2.1 CM²
AV VELOCITY RATIO: 0.71
BSA FOR ECHO PROCEDURE: 1.6 M2
CV ECHO LV RWT: 0.54 CM
DOP CALC AO PEAK VEL: 1.68 M/S
DOP CALC AO VTI: 37.83 CM
DOP CALC LVOT AREA: 3.5 CM2
DOP CALC LVOT DIAMETER: 2.12 CM
DOP CALC LVOT PEAK VEL: 1.19 M/S
DOP CALC LVOT STROKE VOLUME: 79.56 CM3
DOP CALC MV VTI: 18.94 CM
DOP CALCLVOT PEAK VEL VTI: 22.55 CM
E WAVE DECELERATION TIME: 381.05 MSEC
E/A RATIO: 1.25
E/E' RATIO: 6.58 M/S
ECHO LV POSTERIOR WALL: 1.26 CM (ref 0.6–1.1)
EJECTION FRACTION: 58 %
FRACTIONAL SHORTENING: 28 % (ref 28–44)
INTERVENTRICULAR SEPTUM: 0.56 CM (ref 0.6–1.1)
IVRT: 77.07 MSEC
LA MAJOR: 5.61 CM
LA MINOR: 5.62 CM
LA WIDTH: 4.59 CM
LEFT ATRIUM SIZE: 3.66 CM
LEFT ATRIUM VOLUME INDEX MOD: 45.1 ML/M2
LEFT ATRIUM VOLUME INDEX: 49.8 ML/M2
LEFT ATRIUM VOLUME MOD: 72.54 CM3
LEFT ATRIUM VOLUME: 80.18 CM3
LEFT INTERNAL DIMENSION IN SYSTOLE: 3.39 CM (ref 2.1–4)
LEFT VENTRICLE DIASTOLIC VOLUME INDEX: 63.93 ML/M2
LEFT VENTRICLE DIASTOLIC VOLUME: 102.92 ML
LEFT VENTRICLE MASS INDEX: 90 G/M2
LEFT VENTRICLE SYSTOLIC VOLUME INDEX: 29.2 ML/M2
LEFT VENTRICLE SYSTOLIC VOLUME: 47.06 ML
LEFT VENTRICULAR INTERNAL DIMENSION IN DIASTOLE: 4.71 CM (ref 3.5–6)
LEFT VENTRICULAR MASS: 145.33 G
LV LATERAL E/E' RATIO: 5.64 M/S
LV SEPTAL E/E' RATIO: 7.9 M/S
MV A" WAVE DURATION": 8.28 MSEC
MV MEAN GRADIENT: 1 MMHG
MV PEAK A VEL: 0.63 M/S
MV PEAK E VEL: 0.79 M/S
MV PEAK GRADIENT: 2 MMHG
MV STENOSIS PRESSURE HALF TIME: 110.5 MS
MV VALVE AREA BY CONTINUITY EQUATION: 4.2 CM2
MV VALVE AREA P 1/2 METHOD: 1.99 CM2
OHS CV RV/LV RATIO: 0.73 CM
PISA MRMAX VEL: 0.06 M/S
PISA TR MAX VEL: 2.87 M/S
PULM VEIN S/D RATIO: 0.88
PV PEAK D VEL: 0.6 M/S
PV PEAK S VEL: 0.53 M/S
RA MAJOR: 5.75 CM
RA PRESSURE ESTIMATED: 3 MMHG
RA WIDTH: 3.78 CM
RIGHT VENTRICULAR END-DIASTOLIC DIMENSION: 3.46 CM
RV TB RVSP: 6 MMHG
SINUS: 2.84 CM
STJ: 2.43 CM
TDI LATERAL: 0.14 M/S
TDI SEPTAL: 0.1 M/S
TDI: 0.12 M/S
TR MAX PG: 33 MMHG
TRICUSPID ANNULAR PLANE SYSTOLIC EXCURSION: 2.16 CM
TV REST PULMONARY ARTERY PRESSURE: 36 MMHG
Z-SCORE OF LEFT VENTRICULAR DIMENSION IN END DIASTOLE: 0.31
Z-SCORE OF LEFT VENTRICULAR DIMENSION IN END SYSTOLE: 1.42

## 2024-05-15 PROCEDURE — 1101F PT FALLS ASSESS-DOCD LE1/YR: CPT | Mod: CPTII,S$GLB,, | Performed by: INTERNAL MEDICINE

## 2024-05-15 PROCEDURE — 3008F BODY MASS INDEX DOCD: CPT | Mod: CPTII,S$GLB,, | Performed by: INTERNAL MEDICINE

## 2024-05-15 PROCEDURE — G2211 COMPLEX E/M VISIT ADD ON: HCPCS | Mod: S$GLB,,, | Performed by: INTERNAL MEDICINE

## 2024-05-15 PROCEDURE — 3078F DIAST BP <80 MM HG: CPT | Mod: CPTII,S$GLB,, | Performed by: INTERNAL MEDICINE

## 2024-05-15 PROCEDURE — 1157F ADVNC CARE PLAN IN RCRD: CPT | Mod: CPTII,S$GLB,, | Performed by: INTERNAL MEDICINE

## 2024-05-15 PROCEDURE — 3288F FALL RISK ASSESSMENT DOCD: CPT | Mod: CPTII,S$GLB,, | Performed by: INTERNAL MEDICINE

## 2024-05-15 PROCEDURE — 99999 PR PBB SHADOW E&M-EST. PATIENT-LVL V: CPT | Mod: PBBFAC,,, | Performed by: INTERNAL MEDICINE

## 2024-05-15 PROCEDURE — 99214 OFFICE O/P EST MOD 30 MIN: CPT | Mod: S$GLB,,, | Performed by: INTERNAL MEDICINE

## 2024-05-15 PROCEDURE — 93306 TTE W/DOPPLER COMPLETE: CPT | Mod: 26,,, | Performed by: INTERNAL MEDICINE

## 2024-05-15 PROCEDURE — 1159F MED LIST DOCD IN RCRD: CPT | Mod: CPTII,S$GLB,, | Performed by: INTERNAL MEDICINE

## 2024-05-15 PROCEDURE — 3066F NEPHROPATHY DOC TX: CPT | Mod: CPTII,S$GLB,, | Performed by: INTERNAL MEDICINE

## 2024-05-15 PROCEDURE — 1160F RVW MEDS BY RX/DR IN RCRD: CPT | Mod: CPTII,S$GLB,, | Performed by: INTERNAL MEDICINE

## 2024-05-15 PROCEDURE — 93306 TTE W/DOPPLER COMPLETE: CPT

## 2024-05-15 PROCEDURE — 3074F SYST BP LT 130 MM HG: CPT | Mod: CPTII,S$GLB,, | Performed by: INTERNAL MEDICINE

## 2024-05-15 PROCEDURE — 4010F ACE/ARB THERAPY RXD/TAKEN: CPT | Mod: CPTII,S$GLB,, | Performed by: INTERNAL MEDICINE

## 2024-05-15 RX ORDER — IPRATROPIUM BROMIDE AND ALBUTEROL SULFATE 2.5; .5 MG/3ML; MG/3ML
3 SOLUTION RESPIRATORY (INHALATION) EVERY 6 HOURS PRN
Qty: 240 ML | Refills: 11 | Status: SHIPPED | OUTPATIENT
Start: 2024-05-15 | End: 2025-05-15

## 2024-05-15 RX ORDER — AZELASTINE 1 MG/ML
2 SPRAY, METERED NASAL 2 TIMES DAILY
Qty: 30 ML | Refills: 6 | Status: SHIPPED | OUTPATIENT
Start: 2024-05-15 | End: 2024-06-14

## 2024-05-15 NOTE — PROGRESS NOTES
[facility]    SUBJECTIVE:     Chief Complaint: Follow up for cough and shortness of breath.      History of Present Illness:  Lily Green is a 73 y.o. female with a history of T2DM, HTN, celiac disease, PUD, depression, ESRD, prior colectomy for ischemic bowel, RML carcinoid tumor s/p wedge resection, and recent admission for acute decompensated heart failure with newly reduced EF believed to be tachycardia induced.      Since surgery, has had constant cough that is like a dry hacking cough that feels as though she has to clear her throat.  Sleeps well at night without orthopnea.  Does endorse nose congestion and coughs more when she talks.  Had a prolonged hospital stay with bilateral effusions.  Pleural fluid studies were consistent with a transudate 24.      Does have nasal congestion.  Occasional vomiting.  Takes antacid.      She previously underwent robotic bronchoscopy with Dr. Monzon in 10/2023.     Lifelong nonsmoker, no history of emphsyema/bronchitis.     Smoking: Never  Other substances: No  Inhalers: No       Review of patient's allergies indicates:   Allergen Reactions    Crestor [rosuvastatin] Swelling    Gluten protein        Past Medical History:   Diagnosis Date    Anxiety     Celiac disease 2018    Celiac disease     Depression     Diabetes mellitus     Family history of breast cancer in mother      at age 68    Hyperlipidemia     Hypertension     Meniere disease     Meniere's disease, unspecified ear     Menopause     On supplemental oxygen by nasal cannula 2023    Hypoxic respiratory failure resolved.  No oxygen desaturation on walk.        Peptic ulcer     Peritonitis 2023    Pleural effusion on left 01/15/2024    C/w transudate in setting of acute decompensated heart failure due to a-fib and volume overload.  Now appears euvolemic in clinic today, resolved.       Reflux esophagitis     Urinary tract infection     Vaginal infection      Vaginal Pap smear 04/07/2014    Pap/Hpv Negative      Past Surgical History:   Procedure Laterality Date    APPENDECTOMY      BREAST SURGERY      Tags    CARPAL TUNNEL RELEASE Bilateral 09/2017    ,     CLOSURE, COLOSTOMY Left 1/16/2023    Procedure: CLOSURE, COLOSTOMY;  Surgeon: Manuel Javier MD;  Location: [facility] OR;  Service: General;  Laterality: Left;    COLONOSCOPY  04/2018    Normal  ([place])     COLOSTOMY Right 1/16/2023    Procedure: CREATION, COLOSTOMY;  Surgeon: Manuel Javier MD;  Location: [facility] OR;  Service: General;  Laterality: Right;    CORONARY ANGIOGRAPHY N/A 2/21/2024    Procedure: ANGIOGRAM, CORONARY ARTERY;  Surgeon: Todd Carlisle MD;  Location: [facility] CATH LAB;  Service: Cardiology;  Laterality: N/A;    DILATION AND CURETTAGE OF UTERUS  1986    DUPUYTREN CONTRACTURE RELEASE Bilateral 09/2017    ESOPHAGOGASTRODUODENOSCOPY N/A 1/24/2024    Procedure: EGD (ESOPHAGOGASTRODUODENOSCOPY);  Surgeon: Yamilet Walters MD;  Location: [facility] ENDO (2ND FLR);  Service: Gastroenterology;  Laterality: N/A;    ESOPHAGOGASTRODUODENOSCOPY N/A 1/26/2024    Procedure: EGD (ESOPHAGOGASTRODUODENOSCOPY);  Surgeon: Yamilet Walters MD;  Location: [facility] ENDO (2ND FLR);  Service: Gastroenterology;  Laterality: N/A;    HYSTERECTOMY  1987    BEAU w/ appy, no BSO     IMPLANTATION OF LEADLESS PACEMAKER N/A 1/16/2024    Procedure: INSERTION, CARDIAC PACEMAKER, LEADLESS;  Surgeon: LENIN Frances MD;  Location: [facility] EP LAB;  Service: Cardiology;  Laterality: N/A;  SB, LEADLESS PPM (MICRA), MDT, ANES, EH, CVICU [id number]    INJECTION OF NEUROLYTIC AGENT AROUND LUMBAR SYMPATHETIC NERVE N/A 1/6/2022    Procedure: BLOCK, LUMBAR SYMPATHETIC;  Surgeon: Souleymane Rizo Jr., MD;  Location: [facility] PAIN MGT;  Service: Pain Management;  Laterality: N/A;  no pacemaker   pt is diabetic     INJECTION OF NEUROLYTIC AGENT AROUND LUMBAR SYMPATHETIC NERVE N/A 8/25/2022    Procedure: BLOCK, LUMBAR SYMPATHETIC;  Surgeon: Souleymane  COLT Rizo Jr., MD;  Location: [facility] PAIN MGT;  Service: Pain Management;  Laterality: N/A;  diabetic     INSERTION OF TUNNELED CENTRAL VENOUS HEMODIALYSIS CATHETER Right 1/25/2023    Procedure: INSERTION, CATHETER, HEMODIALYSIS, DUAL LUMEN;  Surgeon: Manuel Javier MD;  Location: [facility] OR;  Service: General;  Laterality: Right;    INSERTION, CATHETER, TUNNELED Left 1/28/2023    Procedure: Insertion,catheter,tunneled;  Surgeon: Watson Fontenot MD;  Location: [facility] OR;  Service: General;  Laterality: Left;    LAPAROTOMY, EXPLORATORY N/A 1/14/2023    Procedure: LAPAROTOMY, EXPLORATORY;  Surgeon: Manuel Javier MD;  Location: [facility] OR;  Service: General;  Laterality: N/A;    LAPAROTOMY, EXPLORATORY N/A 1/16/2023    Procedure: LAPAROTOMY, EXPLORATORY;  Surgeon: Manuel Javier MD;  Location: [facility] OR;  Service: General;  Laterality: N/A;    LEFT HEART CATHETERIZATION Left 2/21/2024    Procedure: Left heart cath;  Surgeon: Todd Carlisle MD;  Location: [facility] CATH LAB;  Service: Cardiology;  Laterality: Left;    LYMPHADENECTOMY Right 1/2/2024    Procedure: LYMPHADENECTOMY;  Surgeon: Tan Thomson MD;  Location: [facility] 2ND FLR;  Service: Cardiothoracic;  Laterality: Right;    PACEMAKER, TEMPORARY, TRANSVENOUS, PEDIATRIC Right 1/12/2024    Procedure: Pacemaker, Temporary, Transvenous;  Surgeon: Todd Carlisle MD;  Location: [facility] CATH LAB;  Service: Cardiology;  Laterality: Right;    REMOVAL OF CATHETER Right 1/28/2023    Procedure: REMOVAL, CATHETER;  Surgeon: Watson Fontenot MD;  Location: [facility] OR;  Service: General;  Laterality: Right;    REMOVAL, TUNNELED CATH Right 1/25/2023    Procedure: REMOVAL, TUNNELED CATH;  Surgeon: Manuel Javier MD;  Location: [facility] OR;  Service: General;  Laterality: Right;    ROBOTIC BRONCHOSCOPY N/A 10/20/2023    Procedure: ROBOTIC BRONCHOSCOPY;  Surgeon: Mayda Monzon MD;  Location: [facility] OR 2ND FLR;  Service: Pulmonary;  Laterality: N/A;    SHOULDER SURGERY   2009 & 2010    right rotator cuff    THORACOTOMY Right 1/2/2024    Procedure: THORACOTOMY;  Surgeon: Tan Thomson MD;  Location: [facility] OR [address] [place] [address];  Service: Cardiothoracic;  Laterality: Right;    THORACOTOMY, WITH INITIAL THERAPEUTIC WEDGE RESECTION OF LUNG Right 1/2/2024    Procedure: THORACOTOMY, WITH INITIAL THERAPEUTIC WEDGE RESECTION OF LUNG;  Surgeon: Tan Thomson MD;  [facility];  Service: Cardiothoracic;  Laterality: Right;    TONSILLECTOMY      UPPER GASTROINTESTINAL ENDOSCOPY  04/2018     Family History   Problem Relation Name Age of Onset    Vision loss Father Shin Cervantes Jr.     Arthritis Father Shin Cervantes Jr.     Breast cancer Mother Megan Cervantes     Cancer Mother Megan Cervantes     Vision loss Mother Megan Cervantes     Hyperlipidemia Sister Sweetie Saha     Breast cancer Paternal Aunt Ibis Pe[place]     Cancer Paternal Aunt Ibis Gifty     Diabetes Paternal Grandfather Shin Cervantes Sr.     Vision loss Sister Noreen Saha     Kidney disease Neg Hx       Social History     Socioeconomic History    Marital status:    Tobacco Use    Smoking status: Never    Smokeless tobacco: Never   Substance and Sexual Activity    Alcohol use: No    Drug use: Never    Sexual activity: Not Currently     Partners: Male     Birth control/protection: See Surgical Hx     Comment: :      Social Determinants of Health     Financial Resource Strain: Low Risk  (3/28/2024)    Overall Financial Resource Strain (CARDIA)     Difficulty of Paying Living Expenses: Not hard at all   Food Insecurity: No Food Insecurity (3/28/2024)    Hunger Vital Sign     Worried About Running Out of Food in the Last Year: Never true     Ran Out of Food in the Last Year: Never true   Transportation Needs: No Transportation Needs (3/28/2024)    PRAPARE - Transportation     Lack of Transportation (Medical): No     Lack of Transportation (Non-Medical): No   Physical Activity: Inactive (2/14/2024)     "Exercise Vital Sign     Days of Exercise per Week: 0 days     Minutes of Exercise per Session: 0 min   Stress: Stress Concern Present (3/28/2024)    Citizen of [place] [employer] - Occupational Stress Questionnaire     Feeling of Stress : To some extent   Housing Stability: Low Risk  (3/28/2024)    Housing Stability Vital Sign     Unable to Pay for Housing in the Last Year: No     Number of Places Lived in the Last Year: 1     Unstable Housing in the Last Year: No       Review of Systems:  ROS  CV: no syncope  ENT: no sore throat  Resp: per hpi  Eyes: no eye pain  Gastrointestinal: no nausea or vomiting  Integument/Breast: no rash  Musculoskeletal: no arthralgias  Neurological: no headaches  Behavioral/Psych: no confusion or depression  Heme: no bleeding      OBJECTIVE:     Vitals:    05/15/24 1419   BP: (!) 122/56   BP Location: Left arm   Patient Position: Sitting   Pulse: (!) 57   SpO2: (!) 91%   Weight: 52.6 kg (115 lb 15.4 oz)   Height: 5' 5" (1.651 m)      Vital Signs  Vitals:    05/15/24 1419   BP: (!) 122/56   BP Location: Left arm   Patient Position: Sitting   Pulse: (!) 57   SpO2: (!) 91%   Weight: 52.6 kg (115 lb 15.4 oz)   Height: 5' 5" (1.651 m)     Body mass index is 19.3 kg/m².    Physical Exam:  Physical Exam  General: no distress, thin and frail appearing.   Eyes:  conjunctivae/corneas clear  Nose: no discharge  Neck: trachea midline with no masses appreciated  Lungs:  normal respiratory effort, no wheezes, no rales. Do not appreciate crackles.   Heart: regular rate and rhythm and no murmur  Abdomen: non-distended  Extremities: no cyanosis, no edema, no clubbing  Skin: No rashes or lesions. good skin turgor  Neurologic: alert, oriented, thought content appropriate    Laboratory:  CBC  Lab Results   Component Value Date    WBC 12.84 (H) 05/11/2024    HGB 9.1 (L) 05/11/2024    HCT 30.9 (L) 05/11/2024     05/11/2024    MCV 91 05/11/2024    RDW 17.8 (H) 05/11/2024     BMP  Lab Results "   Component Value Date     05/11/2024    K 3.2 (L) 05/11/2024    CL 97 05/11/2024    CO2 25 05/11/2024    BUN 16 05/11/2024    CREATININE 4.0 (H) 05/11/2024     (H) 05/11/2024    CALCIUM 10.2 05/11/2024    MG 2.0 03/29/2024    PHOS 1.3 (L) 03/29/2024     LFTs  Lab Results   Component Value Date    PROT 8.1 05/11/2024    ALBUMIN 2.2 (L) 05/11/2024    BILITOT 0.6 05/11/2024    AST 47 (H) 05/11/2024    ALKPHOS 120 05/11/2024    ALT 35 05/11/2024 5/14/24 6MWT:  Patient ambulated     05/15/2024---------Distance: 243.84 meters (800 feet)     O2 Sat % Supplemental Oxygen Heart Rate Blood Pressure Karyna Scale   Pre-exercise  (Resting) 95 % Room Air 61 bpm 97/60 mmHg 0.5   During Exercise 92 % Room Air 68 bpm 130/71 (Nsal probe used to obtain Spo2) mmHg 3   Post-exercise  (Recovery) 96 % Room Air  63 bpm 120/69 mmHg         Chest Imaging, My Impression:   Last CT of chest without infiltrate.       Diagnostic Results:  TTE 1/6/2024  Left Ventricle: The left ventricle is normal in size. Normal wall thickness. Normal wall motion. There is normal systolic function with a visually estimated ejection fraction of 60 - 65%. There is normal diastolic function.    Right Ventricle: Normal right ventricular cavity size. Wall thickness is normal. Right ventricle wall motion  is normal. Systolic function is normal.    Aortic Valve: The aortic valve is a trileaflet valve. There is moderate aortic valve sclerosis. There is annular calcification present. Mildly restricted motion. There is mild aortic regurgitation.    Mitral Valve: There is mild regurgitation.    Pulmonary Artery: The estimated pulmonary artery systolic pressure is 32 mmHg.    IVC/SVC: Normal venous pressure at 3 mmHg.    Pericardium: There is a small posterior effusion. Ascites present.       ASSESSMENT/PLAN:     Problem Noted   Upper Airway Cough Syndrome 5/15/2024    I suspect this is due to rhinorrhea.  Refer to ENT to evaluate with laryngoscopy to  ensure no VC injury after surgery.     Chronic Obstructive Pulmonary Disease 5/14/2024    Patient is a lifelong nonsmoker with preop normal PFTs.  Will treat for cough variant bronchitis with nebs BID     Left Lower Lobe Pneumonia (Resolved) 2/29/2024   Pleural Effusion On Left (Resolved) 1/15/2024    C/w transudate in setting of acute decompensated heart failure due to a-fib and volume overload.  Now appears euvolemic in clinic today, resolved.      On Supplemental Oxygen By Nasal Cannula (Resolved) 1/28/2023    Hypoxic respiratory failure resolved.  No oxygen desaturation on walk.         Problem List Items Addressed This Visit       Chronic obstructive pulmonary disease    Overview     Patient is a lifelong nonsmoker with preop normal PFTs.  Will treat for cough variant bronchitis with nebs BID         Upper airway cough syndrome - Primary    Overview     I suspect this is due to rhinorrhea.  Refer to ENT to evaluate with laryngoscopy to ensure no VC injury after surgery.         Relevant Orders    Ambulatory referral/consult to ENT    RESOLVED: On supplemental oxygen by nasal cannula    Overview     Hypoxic respiratory failure resolved.  No oxygen desaturation on walk.           RESOLVED: Pleural effusion on left    Overview     C/w transudate in setting of acute decompensated heart failure due to a-fib and volume overload.  Now appears euvolemic in clinic today, resolved.           Other Visit Diagnoses       Bronchitis        Relevant Medications    albuterol-ipratropium (DUO-NEB) 2.5 mg-0.5 mg/3 mL nebulizer solution    Post-nasal drip        Relevant Medications    azelastine (ASTELIN) 137 mcg (0.1 %) nasal spray          I suspect dyspnea due to deconditioning.  I have messaged Dr. Calixto as recommend PT/OT to build endurance and strength after a prolonged illness.     Visit today included increased complexity associated with the care of the episodic problem see above addressed and managing the  longitudinal care of the patient due to the serious and/or complex managed problem(s) see above. .

## 2024-05-15 NOTE — Clinical Note
I think that she would benefit from more outpatient PT/OT to build endurance.  Oxygenation OK.  Think she is really just deconditioned.  Sending her to ENT to evaluate larynx if there is injury, etc post intubation. Thanks, Mayda

## 2024-05-16 ENCOUNTER — TELEPHONE (OUTPATIENT)
Dept: ELECTROPHYSIOLOGY | Facility: CLINIC | Age: 74
End: 2024-05-16
Payer: MEDICARE

## 2024-05-16 ENCOUNTER — TELEPHONE (OUTPATIENT)
Dept: PRIMARY CARE CLINIC | Facility: CLINIC | Age: 74
End: 2024-05-16
Payer: MEDICARE

## 2024-05-16 DIAGNOSIS — R29.898 MUSCULAR DECONDITIONING: Primary | ICD-10-CM

## 2024-05-16 DIAGNOSIS — E11.22 TYPE 2 DIABETES MELLITUS WITH CHRONIC KIDNEY DISEASE ON CHRONIC DIALYSIS, WITH LONG-TERM CURRENT USE OF INSULIN: ICD-10-CM

## 2024-05-16 DIAGNOSIS — Z99.2 TYPE 2 DIABETES MELLITUS WITH CHRONIC KIDNEY DISEASE ON CHRONIC DIALYSIS, WITH LONG-TERM CURRENT USE OF INSULIN: ICD-10-CM

## 2024-05-16 DIAGNOSIS — Z79.4 TYPE 2 DIABETES MELLITUS WITH CHRONIC KIDNEY DISEASE ON CHRONIC DIALYSIS, WITH LONG-TERM CURRENT USE OF INSULIN: ICD-10-CM

## 2024-05-16 DIAGNOSIS — N18.6 TYPE 2 DIABETES MELLITUS WITH CHRONIC KIDNEY DISEASE ON CHRONIC DIALYSIS, WITH LONG-TERM CURRENT USE OF INSULIN: ICD-10-CM

## 2024-05-16 DIAGNOSIS — K51.20 ULCERATIVE PROCTITIS WITHOUT COMPLICATION: ICD-10-CM

## 2024-05-16 NOTE — TELEPHONE ENCOUNTER
Please let pt know I have conferred with pulmonology and we both recommend a trial of physical therapy to build back stamina and strength.  Referral to PT order placed

## 2024-05-17 NOTE — PROCEDURES
Lily Green is a 73 y.o.  female patient, who presents for a 6 minute walk test ordered by MD Na.  The diagnosis is Cough.  The patient's BMI is 20.5 kg/m2.  Predicted distance (lower limit of normal) is 324.49 meters.      Test Results:    The test was completed without stopping.  The total time walked was 360 seconds.  During walking, the patient reported:  Dyspnea. The patient used a wheelchair during testing.  Oxygen saturation was measured by nasal pulse oximetry probe.    05/14/2024---------Distance: 243.84 meters (800 feet)     O2 Sat % Supplemental Oxygen Heart Rate Blood Pressure Karyna Scale   Pre-exercise  (Resting) 95 % Room Air 61 bpm 97/60 mmHg 0.5   During Exercise 92 % Room Air 68 bpm 130/71 mmHg 3   Post-exercise  (Recovery) 96 % Room Air  63 bpm 120/69 mmHg      Recovery Time: 182 seconds    Performing nurse/tech: Inés GILES      PREVIOUS STUDY:   The patient has not had a previous study.      CLINICAL INTERPRETATION:  Six minute walk distance is 243.84 meters (800 feet) with moderate dyspnea.  During exercise, there was desaturation while breathing room air.  Blood pressure increased significantly and Heart rate remained stable with walking.  The patient did not report non-pulmonary symptoms during exercise.  Significant exercise impairment is likely due to subjective symptoms.  The patient did complete the study, walking 360 seconds of the 360 second test.  No previous study performed.  Based upon age and body mass index, exercise capacity is less than predicted.

## 2024-05-20 ENCOUNTER — OFFICE VISIT (OUTPATIENT)
Dept: ENDOCRINOLOGY | Facility: CLINIC | Age: 74
End: 2024-05-20
Payer: MEDICARE

## 2024-05-20 VITALS
WEIGHT: 112 LBS | BODY MASS INDEX: 18.64 KG/M2 | TEMPERATURE: 98 F | HEART RATE: 69 BPM | SYSTOLIC BLOOD PRESSURE: 96 MMHG | DIASTOLIC BLOOD PRESSURE: 44 MMHG

## 2024-05-20 DIAGNOSIS — N18.6 ESRD (END STAGE RENAL DISEASE) ON DIALYSIS: ICD-10-CM

## 2024-05-20 DIAGNOSIS — E11.29 TYPE 2 DIABETES MELLITUS WITH OTHER KIDNEY COMPLICATION, UNSPECIFIED WHETHER LONG TERM INSULIN USE: Primary | ICD-10-CM

## 2024-05-20 DIAGNOSIS — Z99.2 ESRD (END STAGE RENAL DISEASE) ON DIALYSIS: ICD-10-CM

## 2024-05-20 PROCEDURE — 3066F NEPHROPATHY DOC TX: CPT | Mod: CPTII,S$GLB,, | Performed by: NURSE PRACTITIONER

## 2024-05-20 PROCEDURE — 3008F BODY MASS INDEX DOCD: CPT | Mod: CPTII,S$GLB,, | Performed by: NURSE PRACTITIONER

## 2024-05-20 PROCEDURE — 3074F SYST BP LT 130 MM HG: CPT | Mod: CPTII,S$GLB,, | Performed by: NURSE PRACTITIONER

## 2024-05-20 PROCEDURE — 4010F ACE/ARB THERAPY RXD/TAKEN: CPT | Mod: CPTII,S$GLB,, | Performed by: NURSE PRACTITIONER

## 2024-05-20 PROCEDURE — 1157F ADVNC CARE PLAN IN RCRD: CPT | Mod: CPTII,S$GLB,, | Performed by: NURSE PRACTITIONER

## 2024-05-20 PROCEDURE — 3078F DIAST BP <80 MM HG: CPT | Mod: CPTII,S$GLB,, | Performed by: NURSE PRACTITIONER

## 2024-05-20 PROCEDURE — 1160F RVW MEDS BY RX/DR IN RCRD: CPT | Mod: CPTII,S$GLB,, | Performed by: NURSE PRACTITIONER

## 2024-05-20 PROCEDURE — 99214 OFFICE O/P EST MOD 30 MIN: CPT | Mod: S$GLB,,, | Performed by: NURSE PRACTITIONER

## 2024-05-20 PROCEDURE — 1159F MED LIST DOCD IN RCRD: CPT | Mod: CPTII,S$GLB,, | Performed by: NURSE PRACTITIONER

## 2024-05-20 PROCEDURE — 99999 PR PBB SHADOW E&M-EST. PATIENT-LVL V: CPT | Mod: PBBFAC,,, | Performed by: NURSE PRACTITIONER

## 2024-05-20 RX ORDER — LINAGLIPTIN 5 MG/1
5 TABLET, FILM COATED ORAL DAILY
Qty: 90 TABLET | Refills: 1 | Status: SHIPPED | OUTPATIENT
Start: 2024-05-20 | End: 2025-05-20

## 2024-05-20 RX ORDER — INSULIN ASPART 100 [IU]/ML
INJECTION, SOLUTION INTRAVENOUS; SUBCUTANEOUS
Qty: 15 ML | Refills: 0 | Status: SHIPPED | OUTPATIENT
Start: 2024-05-20

## 2024-05-20 RX ORDER — PEN NEEDLE, DIABETIC 30 GX3/16"
1 NEEDLE, DISPOSABLE MISCELLANEOUS 2 TIMES DAILY
Qty: 200 EACH | Refills: 3 | Status: SHIPPED | OUTPATIENT
Start: 2024-05-20

## 2024-05-20 NOTE — PROGRESS NOTES
CC: This 73 y.o. White female  is here for evaluation of  T2DM along with comorbidities indicated in the Visit Diagnosis section of this encounter.    HPI: Lily Green was diagnosed with T2DM in 50s. Metformin started at the time of diagnosis.   Med hx: celiac disease, ESRD on T/Th/Sat      DM COMPLICATIONS: nephropathy      Prior visit 8/2023 Arrives with  Deric and caregiver Rocky.   A1c down from 5.8 to 5.3%. Of note, pt has chronic anemia so A1c indicates falsely low glucoses.   Underwent colostomy closure on 8/7.   She had been taking Lantus at 4 units bid. Stopped using Dexcom d/t alarms.   She has not been taking insulin since sx as her appetite has been quite low and glucoses have remained controlled. She did take Lantus 4 units last night because BG was a bit high at 151. This was 2 hours after a NovoSource Renal drink (43 grams CHO).   Plan No Lantus at this time.   Test glucose 1-2x/day. Pre-meal glucoses should be less than 150. Post-meal glucoses should be less than 200.   If premeal glucoses are higher than 150, use Novolog (orange pen) as needed with correction scale: 150-200=+1, 201-250=+2, 251-300=+3; 301-350=+4, over 350=+5 units  If glucoses are consistently high, contact office and have glucose log ready to send in.   Return to clinic in 4-6 months. Marilou written logs to every visit.       Interval hx Arrives with  Deric and caregiver Rocky.   Pt last seen several months ago.      In 1/2024, she was hospitalized for parapneumonic effusion, a fib with RVR and treated for pneumonia. Discharged to SNF. In 2/2024, hospitalized again for respiratory failure due to A fib and systolic heart failure and treated for pneumonia. Discharged back to SNF. On 3/26 she was placed in observation from SNF due to labs showing hypokalemia (K 2.7). Potassium and phosphorus replaced and transfused pRBC. Once potassium normalized, pt discharged back to SNF.     She has lost 20 lb since  last visit.         LAST DIABETES EDUCATION: none, has attended a celiac disease and diabetes seminar years ago.     HOSPITALIZED FOR DIABETES  -  No.    SIGNIFICANT DIABETES MED HISTORY: n/a     PRESCRIBED DIABETES MEDICATIONS:   Novolog ss ac:  201-250=+4; 251-300=+6; 301-350=+8, over 350=+10 units    Misses medication doses - as above     Rotates insulin injections:  abdomen    Changes insulin pen needles: yes      SELF MONITORING BLOOD GLUCOSE: Checks blood glucose at home 1x/day, fasting                   HYPOGLYCEMIC EPISODES: none recent         CURRENT DIET: drinks water and 1 decaf coffee in AM, Novosource protein shake. Eats 3 meals/day. No snacks.   Dinner 6:30 to 7     CURRENT EXERCISE:       BP (!) 96/44   Pulse 69   Temp 98.2 °F (36.8 °C)   Wt 50.8 kg (112 lb)   LMP  (LMP Unknown) Comment: BEAU/ NO BSO  1986  BMI 18.64 kg/m²       ROS:   CONSTITUTIONAL: Appetite low, +  fatigue        PHYSICAL EXAM:  GENERAL: Well developed, well nourished. No acute distress.   PSYCH: AAOx3, appropriate mood and affect, conversant, very well-groomed. Judgement and insight good.   NEURO: Cranial nerves grossly intact. Speech clear, no tremor.   CHEST: Respirations even and unlabored.          Hemoglobin A1C   Date Value Ref Range Status   11/15/2023 4.9 4.0 - 5.6 % Final     Comment:     ADA Screening Guidelines:  5.7-6.4%  Consistent with prediabetes  >or=6.5%  Consistent with diabetes    High levels of fetal hemoglobin interfere with the HbA1C  assay. Heterozygous hemoglobin variants (HbS, HgC, etc)do  not significantly interfere with this assay.   However, presence of multiple variants may affect accuracy.     07/11/2023 5.3 4.0 - 5.6 % Final     Comment:     ADA Screening Guidelines:  5.7-6.4%  Consistent with prediabetes  >or=6.5%  Consistent with diabetes    High levels of fetal hemoglobin interfere with the HbA1C  assay. Heterozygous hemoglobin variants (HbS, HgC, etc)do  not significantly interfere with this  assay.   However, presence of multiple variants may affect accuracy.     11/01/2022 5.8 (H) 4.0 - 5.6 % Final     Comment:     ADA Screening Guidelines:  5.7-6.4%  Consistent with prediabetes  >or=6.5%  Consistent with diabetes    High levels of fetal hemoglobin interfere with the HbA1C  assay. Heterozygous hemoglobin variants (HbS, HgC, etc)do  not significantly interfere with this assay.   However, presence of multiple variants may affect accuracy.     10/23/2019 6.8 % Final       Lab Results   Component Value Date    CPEPTIDE 5.63 (H) 07/11/2023    GLUTAMICACID 0.00 07/11/2023    ISLETCELLANT <1:4 07/11/2023        Lab Results   Component Value Date    CHOL 125 02/16/2024    CHOL 150 11/15/2023    CHOL 149 11/01/2022     Lab Results   Component Value Date    HDL 39 (L) 02/16/2024    HDL 45 11/15/2023    HDL 54 11/01/2022     Lab Results   Component Value Date    LDLCALC 60.4 (L) 02/16/2024    LDLCALC 64.8 11/15/2023    LDLCALC 77.0 11/01/2022     Lab Results   Component Value Date    TRIG 128 02/16/2024    TRIG 201 (H) 11/15/2023    TRIG 96 01/14/2023     Lab Results   Component Value Date    CHOLHDL 31.2 02/16/2024    CHOLHDL 30.0 11/15/2023    CHOLHDL 36.2 11/01/2022         Component Value Date/Time     05/11/2024 1521    K 3.2 (L) 05/11/2024 1521    CL 97 05/11/2024 1521    CO2 25 05/11/2024 1521    BUN 16 05/11/2024 1521    CREATININE 4.0 (H) 05/11/2024 1521     (H) 05/11/2024 1521    CALCIUM 10.2 05/11/2024 1521    ALKPHOS 120 05/11/2024 1521    AST 47 (H) 05/11/2024 1521    ALT 35 05/11/2024 1521    BILITOT 0.6 05/11/2024 1521    EGFRNORACEVR 11.3 (A) 05/11/2024 1521    ESTGFRAFRICA 6 10/14/2023 0436    ESTGFRAFRICA >60.0 04/22/2022 0910           Lab Results   Component Value Date    LABMICR 17.0 11/16/2023    CREATRANDUR 21.0 11/16/2023    MICALBCREAT 81.0 (H) 11/16/2023             ASSESSMENT and PLAN:    A1C GOAL: n/a d/t anemia     1. Type 2 diabetes mellitus with other kidney  complication, unspecified whether long term insulin use  Fasting glucoses are a bit high. Will hold off on resuming basal insulin and try Tradjenta first. Advised:     Start Tradjenta 5 mg once daily.   Continue Novolog sliding scale.   Pt prefers fingersticks over Dexcom.   Test sugar 2x/day and send a sugar log in 2 weeks.   Return to clinic in 3 months. A1c added to lab appt in July. Expect A1c to be falsely low however d/t anemia.     Hemoglobin A1C    insulin aspart U-100 (NOVOLOG) 100 unit/mL (3 mL) InPn pen      2. ESRD (end stage renal disease) on dialysis  Avoid hypoglycemia.               Orders Placed This Encounter   Procedures    Hemoglobin A1C     Standing Status:   Future     Standing Expiration Date:   7/19/2025        Follow up in about 3 months (around 8/20/2024).

## 2024-05-20 NOTE — PATIENT INSTRUCTIONS
Fasting glucoses are a bit high. Will hold off on resuming basal insulin and try Tradjenta first. Advised:     Start Tradjenta 5 mg once daily.   Continue Novolog sliding scale.   Pt prefers fingersticks over Dexcom.   Test sugar 2x/day and send a sugar log in 2 weeks.   Return to clinic in 3 months. A1c added to lab appt in July. Expect A1c to be falsely low however d/t anemia.

## 2024-05-24 ENCOUNTER — OFFICE VISIT (OUTPATIENT)
Dept: ELECTROPHYSIOLOGY | Facility: CLINIC | Age: 74
End: 2024-05-24
Payer: MEDICARE

## 2024-05-24 ENCOUNTER — HOSPITAL ENCOUNTER (OUTPATIENT)
Dept: CARDIOLOGY | Facility: CLINIC | Age: 74
Discharge: HOME | End: 2024-05-24
Attending: STUDENT IN AN ORGANIZED HEALTH CARE EDUCATION/TRAINING PROGRAM
Payer: MEDICARE

## 2024-05-24 ENCOUNTER — CLINICAL SUPPORT (OUTPATIENT)
Dept: CARDIOLOGY | Facility: HOSPITAL | Age: 74
End: 2024-05-24
Attending: STUDENT IN AN ORGANIZED HEALTH CARE EDUCATION/TRAINING PROGRAM
Payer: MEDICARE

## 2024-05-24 VITALS
BODY MASS INDEX: 18.66 KG/M2 | SYSTOLIC BLOOD PRESSURE: 102 MMHG | HEIGHT: 65 IN | DIASTOLIC BLOOD PRESSURE: 40 MMHG | HEART RATE: 66 BPM | WEIGHT: 112 LBS

## 2024-05-24 DIAGNOSIS — I10 PRIMARY HYPERTENSION: ICD-10-CM

## 2024-05-24 DIAGNOSIS — K21.9 GERD WITHOUT ESOPHAGITIS: ICD-10-CM

## 2024-05-24 DIAGNOSIS — D63.1 ANEMIA IN ESRD (END-STAGE RENAL DISEASE): Chronic | ICD-10-CM

## 2024-05-24 DIAGNOSIS — K27.9 PEPTIC ULCER DISEASE: ICD-10-CM

## 2024-05-24 DIAGNOSIS — Z95.0 PACEMAKER: ICD-10-CM

## 2024-05-24 DIAGNOSIS — N18.6 ESRD (END STAGE RENAL DISEASE) ON DIALYSIS: ICD-10-CM

## 2024-05-24 DIAGNOSIS — E78.2 MIXED HYPERLIPIDEMIA: ICD-10-CM

## 2024-05-24 DIAGNOSIS — I77.0 A-V FISTULA: ICD-10-CM

## 2024-05-24 DIAGNOSIS — R00.1 SYMPTOMATIC BRADYCARDIA: ICD-10-CM

## 2024-05-24 DIAGNOSIS — I48.0 PAROXYSMAL ATRIAL FIBRILLATION: ICD-10-CM

## 2024-05-24 DIAGNOSIS — I49.5 TACHYCARDIA-BRADYCARDIA SYNDROME: Primary | ICD-10-CM

## 2024-05-24 DIAGNOSIS — F33.0 MILD RECURRENT MAJOR DEPRESSION: ICD-10-CM

## 2024-05-24 DIAGNOSIS — I50.20 HFREF (HEART FAILURE WITH REDUCED EJECTION FRACTION): ICD-10-CM

## 2024-05-24 DIAGNOSIS — E11.42 TYPE 2 DIABETES MELLITUS WITH DIABETIC POLYNEUROPATHY, WITHOUT LONG-TERM CURRENT USE OF INSULIN: Chronic | ICD-10-CM

## 2024-05-24 DIAGNOSIS — Z99.2 ESRD (END STAGE RENAL DISEASE) ON DIALYSIS: ICD-10-CM

## 2024-05-24 DIAGNOSIS — K55.9 LARGE BOWEL ISCHEMIA: ICD-10-CM

## 2024-05-24 DIAGNOSIS — F41.1 GENERALIZED ANXIETY DISORDER: Chronic | ICD-10-CM

## 2024-05-24 DIAGNOSIS — K90.0 CELIAC DISEASE: Chronic | ICD-10-CM

## 2024-05-24 DIAGNOSIS — I48.3 TYPICAL ATRIAL FLUTTER: ICD-10-CM

## 2024-05-24 DIAGNOSIS — I50.9 ACUTE DECOMPENSATED HEART FAILURE: Primary | ICD-10-CM

## 2024-05-24 DIAGNOSIS — N18.6 ANEMIA IN ESRD (END-STAGE RENAL DISEASE): Chronic | ICD-10-CM

## 2024-05-24 DIAGNOSIS — I50.42 CHRONIC COMBINED SYSTOLIC AND DIASTOLIC HEART FAILURE: ICD-10-CM

## 2024-05-24 DIAGNOSIS — I42.8 NONISCHEMIC CARDIOMYOPATHY: ICD-10-CM

## 2024-05-24 DIAGNOSIS — N18.6 ESRD (END STAGE RENAL DISEASE): Chronic | ICD-10-CM

## 2024-05-24 DIAGNOSIS — E03.9 ACQUIRED HYPOTHYROIDISM: ICD-10-CM

## 2024-05-24 DIAGNOSIS — C7A.090 MALIGNANT CARCINOID TUMOR OF BRONCHUS: ICD-10-CM

## 2024-05-24 LAB
OHS QRS DURATION: 100 MS
OHS QTC CALCULATION: 436 MS

## 2024-05-24 PROCEDURE — 93288 INTERROG EVL PM/LDLS PM IP: CPT

## 2024-05-24 PROCEDURE — 1157F ADVNC CARE PLAN IN RCRD: CPT | Mod: CPTII,S$GLB,, | Performed by: STUDENT IN AN ORGANIZED HEALTH CARE EDUCATION/TRAINING PROGRAM

## 2024-05-24 PROCEDURE — 3288F FALL RISK ASSESSMENT DOCD: CPT | Mod: CPTII,S$GLB,, | Performed by: STUDENT IN AN ORGANIZED HEALTH CARE EDUCATION/TRAINING PROGRAM

## 2024-05-24 PROCEDURE — 3066F NEPHROPATHY DOC TX: CPT | Mod: CPTII,S$GLB,, | Performed by: STUDENT IN AN ORGANIZED HEALTH CARE EDUCATION/TRAINING PROGRAM

## 2024-05-24 PROCEDURE — 93279 PRGRMG DEV EVAL PM/LDLS PM: CPT | Mod: 26,,, | Performed by: STUDENT IN AN ORGANIZED HEALTH CARE EDUCATION/TRAINING PROGRAM

## 2024-05-24 PROCEDURE — 3078F DIAST BP <80 MM HG: CPT | Mod: CPTII,S$GLB,, | Performed by: STUDENT IN AN ORGANIZED HEALTH CARE EDUCATION/TRAINING PROGRAM

## 2024-05-24 PROCEDURE — 3008F BODY MASS INDEX DOCD: CPT | Mod: CPTII,S$GLB,, | Performed by: STUDENT IN AN ORGANIZED HEALTH CARE EDUCATION/TRAINING PROGRAM

## 2024-05-24 PROCEDURE — 1126F AMNT PAIN NOTED NONE PRSNT: CPT | Mod: CPTII,S$GLB,, | Performed by: STUDENT IN AN ORGANIZED HEALTH CARE EDUCATION/TRAINING PROGRAM

## 2024-05-24 PROCEDURE — 93010 ELECTROCARDIOGRAM REPORT: CPT | Mod: S$GLB,,, | Performed by: INTERNAL MEDICINE

## 2024-05-24 PROCEDURE — 4010F ACE/ARB THERAPY RXD/TAKEN: CPT | Mod: CPTII,S$GLB,, | Performed by: STUDENT IN AN ORGANIZED HEALTH CARE EDUCATION/TRAINING PROGRAM

## 2024-05-24 PROCEDURE — 93286 PERI-PX EVAL PM/LDLS PM IP: CPT | Mod: 59

## 2024-05-24 PROCEDURE — 99999 PR PBB SHADOW E&M-EST. PATIENT-LVL V: CPT | Mod: PBBFAC,,, | Performed by: STUDENT IN AN ORGANIZED HEALTH CARE EDUCATION/TRAINING PROGRAM

## 2024-05-24 PROCEDURE — 1101F PT FALLS ASSESS-DOCD LE1/YR: CPT | Mod: CPTII,S$GLB,, | Performed by: STUDENT IN AN ORGANIZED HEALTH CARE EDUCATION/TRAINING PROGRAM

## 2024-05-24 PROCEDURE — 93005 ELECTROCARDIOGRAM TRACING: CPT | Mod: S$GLB,,, | Performed by: STUDENT IN AN ORGANIZED HEALTH CARE EDUCATION/TRAINING PROGRAM

## 2024-05-24 PROCEDURE — 3074F SYST BP LT 130 MM HG: CPT | Mod: CPTII,S$GLB,, | Performed by: STUDENT IN AN ORGANIZED HEALTH CARE EDUCATION/TRAINING PROGRAM

## 2024-05-24 PROCEDURE — 99214 OFFICE O/P EST MOD 30 MIN: CPT | Mod: S$GLB,,, | Performed by: STUDENT IN AN ORGANIZED HEALTH CARE EDUCATION/TRAINING PROGRAM

## 2024-05-24 PROCEDURE — 1159F MED LIST DOCD IN RCRD: CPT | Mod: CPTII,S$GLB,, | Performed by: STUDENT IN AN ORGANIZED HEALTH CARE EDUCATION/TRAINING PROGRAM

## 2024-05-24 NOTE — PROGRESS NOTES
Appt scheduled and confirmed with pt for 5/29/24 with Nurse Rodriguez.    [facility] Virtual Visit Note    The patient location is: Home  The chief complaint leading to consultation is: follow up chronic conditions  Visit type: Virtual visit with synchronous audio and video  Total time spent with patient: 20 min  Each patient to whom he or she provides medical services by telemedicine is:  (1) informed of the relationship between the physician and patient and the respective role of any other health care provider with respect to management of the patient; and (2) notified that he or she may decline to receive medical services by telemedicine and may withdraw from such care at any time.      Chief Complaint      Chief Complaint   Patient presents with    Follow-up       History of Present Illness      MARIA LUISA Green is a 69 y.o. female with chronic conditions of DM2 with neuropathy, HLD, GERD, celiac disease, depression, insomnia, chronic neck pain who presents today for: follow up chronic conditions.   DM2 with neuropathy: Controlled on metformin.  Recent A1C 6.2.  Improved from 6.6.  Eye exam UTD with Dr. Rivera.  Foot exam UTD with Dr. Bryan.  HLD: Controlled on lipitor.  LDL 93 last check.  Will update with next labs.  GERD: Controlled on pantoprazole.  No new or worsening symptoms.  Depression: Controlled on effexor.  No new or worsening symptoms  Insomnia: Controlled on restoril.  Control is satisfactory on this regimen.  Chronic neck pain 2/2 cervical degenerative disc disease: Sees Dr. Bunch. Had a mechanical fall on uneven pavement and now has increased stiffness in the posterior neck.  Has been getting massage therapy and cupping, also dry needling with [facility].  On gabapentin 300 mg in AM, lunch and 400 mg at night.  Celiac disease: previously saw Dr. Amor.  Avoiding triggers to avoid exacerbations.  Doing well with diet control.  Flu shot UTD 2019.  TdAP 2015.  Prevnar UTD, pneumovax UTD.  Shingrix discussed.  Mammogram declines.  DEXA 2015,  normal.  Cscope 2018, Dr. Amor, no polyps, 5 yr interval.    Past Medical History:  Past Medical History:   Diagnosis Date    Anxiety     Celiac disease 2018    Celiac disease     Depression     Family history of breast cancer in mother      at age 68    H/O mammogram 2018    Birads 2/B  (Dr Deleon, Orders MMG yearly)     Hx of bone density study 2014    Normal     Hyperlipidemia     Hypertension     Menopause     Peptic ulcer     Reflux esophagitis     Urinary tract infection     Vaginal infection     Vaginal Pap smear 2014    Pap/Hpv Negative        Past Surgical History:   has a past surgical history that includes Tonsillectomy; Appendectomy; Dilation and curettage of uterus (); Hysterectomy (); Carpal tunnel release (Bilateral, 2017); Shoulder surgery ( & ); Colonoscopy (2018); Upper gastrointestinal endoscopy (2018); and Dupuytren contracture release (Bilateral, 2017).    Family History:  family history includes Breast cancer in her mother and paternal aunt; Cancer in her mother and paternal aunt; Hyperlipidemia in her sister.     Social History:  Social History     Tobacco Use    Smoking status: Never Smoker    Smokeless tobacco: Never Used   Substance Use Topics    Alcohol use: No     Frequency: Never     Drinks per session: Patient refused     Binge frequency: Never    Drug use: No       Review of Systems   Constitutional: Negative for chills, fever and malaise/fatigue.   HENT: Negative for hearing loss.    Eyes: Negative for discharge.   Respiratory: Negative for shortness of breath and wheezing.    Cardiovascular: Negative for chest pain and palpitations.   Gastrointestinal: Positive for constipation. Negative for blood in stool, diarrhea, nausea and vomiting.   Genitourinary: Negative for dysuria and hematuria.   Musculoskeletal: Positive for neck pain.   Skin: Negative for rash.   Neurological: Negative for weakness and  headaches.   Endo/Heme/Allergies: Negative for polydipsia.        Medications:  Outpatient Encounter Medications as of 4/22/2020   Medication Sig Note Dispense Refill    ACCU-CHEK COMPACT PLUS TEST Strp    3    ACCU-CHEK SOFTCLIX LANCETS Misc    3    alpha lipoic acid 600 mg Cap        atorvastatin (LIPITOR) 40 MG tablet Take 1 tablet (40 mg total) by mouth once daily.  90 tablet 3    azelastine (ASTELIN) 137 mcg (0.1 %) nasal spray 1 spray (137 mcg total) by Nasal route 2 (two) times daily. for 14 days  30 mL 0    blood-glucose meter kit Use as instructed.  Accu-chek Compact  1 each 0    calcium carbonate/vitamin D3 (CALTRATE WITH VITAMIN D3 ORAL) Take by mouth.       cinnamon bark (CINNAMON ORAL) Take by mouth.       coenzyme Q10 400 mg Cap Take by mouth.       flaxseed oil 1,000 mg Cap once a day 10/23/2017: Received from: [facility] and [facility]sidi[facility]      gabapentin (NEURONTIN) 400 MG capsule    3    gluc-condr-om3-dha-epa-fish-st (GLUCOSAMINE CHONDROITIN PLUS) 163-389-79-54 mg Cap once a day 10/23/2017: Received from: [facility] and [facility]idi[facility] and [facility]      glucosamine HCl 1,500 mg Tab Take 1,500 mg by mouth.       KRILL OIL ORAL Take by mouth.       metFORMIN (GLUCOPHAGE) 500 MG tablet Take 1 tablet (500 mg total) by mouth 2 (two) times daily with meals.  180 tablet 3    MILK THISTLE ORAL Take by mouth.       ondansetron (ZOFRAN) 4 MG tablet Take 1 tablet (4 mg total) by mouth every 8 (eight) hours as needed for Nausea.  30 tablet 0    pantoprazole (PROTONIX) 40 MG tablet  10/17/2019: Take as needed  11    polyethylene glycol (GLYCOLAX) 17 gram PwPk Take by mouth.       temazepam (RESTORIL) 30 mg capsule TAKE 1 CAPSULE (30 MG TOTAL) BY MOUTH NIGHTLY AS NEEDED FOR INSOMNIA.  30 capsule 3    venlafaxine (EFFEXOR-XR) 75 MG 24 hr capsule Take 1 capsule (75 mg total) by mouth once daily.  90 capsule 3    vit C,N-Ve-jripi-lutein-zeaxan  (PRESERVISION AREDS 2) 035-200-76-1 mg-unit-mg-mg Cap        vitamins  A,C,E-zinc-copper (PRESERVISION AREDS) 14,320-226-200 unit-mg-unit Cap Take 1 tablet by mouth once daily.        No facility-administered encounter medications on file as of 4/22/2020.        Allergies:  Review of patient's allergies indicates:   Allergen Reactions    Crestor [rosuvastatin] Swelling       Health Maintenance:  Immunization History   Administered Date(s) Administered    Influenza 11/30/2012    Influenza Split 10/15/2019    Pneumococcal Conjugate - 13 Valent 04/27/2018    Pneumococcal Polysaccharide - 23 Valent 10/14/2019      Health Maintenance   Topic Date Due    Hepatitis C Screening  1950    TETANUS VACCINE  08/02/1968    DEXA SCAN  04/07/2017    Pneumococcal Vaccine (65+ Low/Medium Risk) (2 of 2 - PPSV23) 04/27/2019    Mammogram  11/04/2020    Lipid Panel  10/23/2024    Colonoscopy  04/09/2028        Physical Exam       ]    Physical Exam   Constitutional: She is oriented to person, place, and time. She appears well-developed and well-nourished. No distress.   Eyes: Conjunctivae and EOM are normal. Right eye exhibits no discharge. Left eye exhibits no discharge. No scleral icterus.   Pulmonary/Chest: Effort normal. No respiratory distress.   Neurological: She is alert and oriented to person, place, and time.   Skin: She is not diaphoretic.   Psychiatric: She has a normal mood and affect. Her behavior is normal. Judgment and thought content normal.        Laboratory:  CBC:      CMP:  Recent Labs   Lab 04/15/20  0907   Glucose 137 H   Calcium 10.5   Albumin 4.8   Total Protein 9.1 H   Sodium 139   Potassium 4.5   CO2 29   Chloride 101   BUN, Bld 18 H   Alkaline Phosphatase 124   ALT 30   AST 38   Total Bilirubin 0.6     URINALYSIS:  Recent Labs   Lab 07/02/19  2122  04/15/20  0944   Color, UA Yellow   < > Yellow   Specific Gravity, UA 1.010  --  1.015   Spec Grav UA  --    < >  --    pH, UA 6.0   < > 7.0    Protein, UA 2+ A  --  Negative   Bacteria Occasional  --   --    Nitrite, UA Negative   < > Negative   Leukocytes, UA 1+ A  --  Negative   Urobilinogen, UA Negative   < > Negative   Hyaline Casts, UA 0  --   --     < > = values in this interval not displayed.      LIPIDS:  Recent Labs   Lab 10/23/19 10/31/19  1107   TSH  --  1.321   HDL 53  --    Cholesterol 166  --    Triglycerides 102  --    LDL Cholesterol 93  --      TSH:  Recent Labs   Lab 10/31/19  1107   TSH 1.321     A1C:  Recent Labs   Lab 10/23/19 04/15/20  0907   Hemoglobin A1C 6.8 6.2 H       Assessment/Plan     MARIA LUISA Green is a 69 y.o.female with:    1. Type 2 diabetes mellitus with diabetic polyneuropathy, without long-term current use of insulin  Continue current meds.  Control is improving.  2. Hyperlipidemia, mixed  Continue current meds.  Labs due next visit.  3. Mild recurrent major depression  Continue current meds.    4. Primary insomnia  Continue current meds.    5. Chronic neck pain  Continue current meds.  F/U with PT when re-opens.  6. Celiac disease  Cont diet.  7. GERD without esophagitis  Continue current meds.    8. DDD (degenerative disc disease), cervical   As above    Chronic conditions status updated as per HPI.  Other than changes above, cont current medications and maintain follow up with specialists.  Return to clinic in 6 months.    Pavel Calixto MD  [facility]                Answers for HPI/ROS submitted by the patient on 4/15/2020   activity change: Yes  unexpected weight change: No  rhinorrhea: Yes  trouble swallowing: No  visual disturbance: No  chest tightness: No  polyuria: No  difficulty urinating: No  menstrual problem: No  joint swelling: No  arthralgias: No  confusion: No  dysphoric mood: No

## 2024-05-25 PROBLEM — K27.9 PEPTIC ULCER DISEASE: Status: ACTIVE | Noted: 2024-01-24

## 2024-05-25 PROBLEM — Z95.0 PACEMAKER: Status: ACTIVE | Noted: 2024-05-25

## 2024-05-25 PROBLEM — Z99.2 ESRD (END STAGE RENAL DISEASE) ON DIALYSIS: Status: ACTIVE | Noted: 2024-01-02

## 2024-05-25 PROBLEM — I50.9 ACUTE DECOMPENSATED HEART FAILURE: Status: RESOLVED | Noted: 2024-02-12 | Resolved: 2024-05-25

## 2024-05-25 PROBLEM — I50.42 CHRONIC COMBINED SYSTOLIC AND DIASTOLIC HEART FAILURE: Status: ACTIVE | Noted: 2024-05-25

## 2024-05-25 PROBLEM — I50.20 HFREF (HEART FAILURE WITH REDUCED EJECTION FRACTION): Status: ACTIVE | Noted: 2024-05-25

## 2024-05-25 PROBLEM — I42.8 NONISCHEMIC CARDIOMYOPATHY: Status: ACTIVE | Noted: 2024-05-25

## 2024-05-25 PROBLEM — I49.5 TACHYCARDIA-BRADYCARDIA SYNDROME: Status: ACTIVE | Noted: 2024-05-25

## 2024-05-25 RX ORDER — AMIODARONE HYDROCHLORIDE 200 MG/1
200 TABLET ORAL DAILY
Qty: 180 TABLET | Refills: 3 | Status: SHIPPED | OUTPATIENT
Start: 2024-05-25

## 2024-05-25 NOTE — PROGRESS NOTES
Electrophysiology Clinic Note    Reason for follow-up patient visit: Ongoing evaluation and routine device surveillance of a Medtronic Micra AV leadless pacemaker, implanted on 1/16/2024 in the setting of tachycardia-bradycardia syndrome with persistent atrial fibrillation and periods of symptomatic bradycardia with prolonged pauses.     PRESENTING HISTORY:     History of Present Illness:  Ms. Lily Green is a disha 73-year-old woman who returns to clinic today for ongoing evaluation and routine device surveillance of a Medtronic Micra AV leadless pacemaker, implanted on 1/16/2024 in the setting of tachycardia-bradycardia syndrome with persistent atrial fibrillation and periods of symptomatic bradycardia with prolonged pauses. She has a past medical history significant for tachycardia-bradycardia syndrome with persistent atrial fibrillation and periods of symptomatic bradycardia, a history of nonischemic cardiomyopathy with her most recent LVEF 55-60%, hypertension, hyperlipidemia, type II diabetes mellitus, celiac disease, a right middle lobe carcinoid tumor s/p robotic-assisted converted to right thoracotomy with therapeutic wedge resection and mediastinal lymph node dissection and repair of bleeding right inferior pulmonary vein on 1/2/2024, end-stage renal disease maintained on hemodialysis via a left AV fistula, peptic ulcer disease, a history of peritonitis due to colonic ischemia s/p a total colectomy and end ileostomy creation on 1/14/2023, evacuation of an intraabdominal hematoma and takedown of left lateral ileostomy and recreation of right lateral end ileostomy on 1/16/2023, elective robotic ileostomy reversal and ileocolonic anastomosis on 8/7/2023, GERD, anxiety, depression, hypothyroidism, and low BMI.     She was previously admitted from 1/2/2024 - 2/6/2024 in order to undergo a flexible bronchoscopy with robotic-assisted surgery that was converted to a right thoracotomy with a wedge  resection of her carcinoid tumor, with open mediastinal lymph node dissection with Pippa Thomson and Reva. She was noted to have difficult to control postoperative atrial fibrillation with RVR and was initiated on amiodarone, beta-blockade, and digoxin. Unfortunately, she was noted to have several bradycardic events with asystole, requiring placement of a temporary pacing wire. 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 We discussed the indication for undergoing implantation of a Medtronic Micra AV leadless pacemaker. This procedure was successfully performed on 1/16/2024. She was readmitted on 2/12/2024 from her skilled nursing facility with increased work of breathing, dyspnea, and hypoxia with oxygen saturation of 70% on 2 liters/minute of supplemental oxygen. She underwent emergency dialysis with improved volume status. A CXR showed pleural effusions and possible multifocal pneumonia. A repeat echocardiogram revealed a new reduced systolic ejection fraction of 15-20%, with development of atrial fibrillation with RVR on 2/13/2024. She was started on an amiodarone infusion, in addition to continued oral anticoagulation with apixaban. She underwent slow low-efficiency dialysis (SLED). A CT revealed fluid collections in the right paraesophageal space and azygoesophageal recess, with recommendations for Interventional Radiology for drainage; however, there was no safe window for approach. Interventional Cardiology initially deferred angiography due to her respiratory failure. A thoracentesis was performed on 2/15/2024 removing 1.45 liters, which was sent for analysis. She subsequently developed re-expansion pulmonary edema and was started on Airvo high flow oxygen; this was able to be successfully weaned off. Interventional Cardiology was  reconsulted, and a coronary angiogram revealed luminal irregularities. She was subsequently seen with the Advanced Heart Failure team for management of her nonischemic cardiomyopathy. Interventional Radiology performed a second thoracentesis with removal of 725 mL of pleural fluid on 2/27/2024. A chest CTA showed a new left lower lobe pneumonia, and she was prescribed a course of doxycycline and cefpodoxime. She was subsequently discharged to her skilled nursing facility on 3/1/2024. She was briefly readmitted from 3/26/2024 - 3/29/2024 with hypokalemia, with no subsequent admissions since this time.      In discussion with Ms. Green today, she tells me that she is feeling overall quite well. She presents with her  and one of her home health nurses in a wheelchair. She denies any episodes of dizziness, lightheadedness, syncope, or presyncope, but occasionally reports transient dizziness with abrupt positional changes. She denies any palpitations, chest pain or chest discomfort, nausea or vomiting, orthopnea, or PND. She reports baseline shortness of breath and dyspnea with exertion that she feels has remained stable, with a chronic cough. She uses her rolling walker for ambulation.     Review of Systems:  Review of Systems   Constitutional:  Negative for activity change.   HENT:  Negative for nasal congestion, nosebleeds, postnasal drip, rhinorrhea, sinus pressure/congestion, sneezing and sore throat.    Respiratory:  Positive for shortness of breath. Negative for apnea, cough, chest tightness and wheezing.    Cardiovascular:  Positive for leg swelling. Negative for chest pain and palpitations.   Gastrointestinal:  Positive for reflux. Negative for abdominal distention, abdominal pain, blood in stool, change in bowel habit, constipation, diarrhea, nausea and vomiting.   Genitourinary:  Negative for dysuria and hematuria.   Musculoskeletal:  Positive for arthralgias, back pain and gait problem.    Neurological:  Positive for dizziness and light-headedness. Negative for seizures, syncope, weakness, headaches and coordination difficulties.        PAST HISTORY:     Past Medical History:   Diagnosis Date    Anxiety     Celiac disease 2018    Celiac disease     Depression     Diabetes mellitus     Family history of breast cancer in mother      at age 68    Hyperlipidemia     Hypertension     Meniere disease     Meniere's disease, unspecified ear     Menopause     On supplemental oxygen by nasal cannula 2023    Hypoxic respiratory failure resolved.  No oxygen desaturation on walk.        Peptic ulcer     Peritonitis 2023    Pleural effusion on left 01/15/2024    C/w transudate in setting of acute decompensated heart failure due to a-fib and volume overload.  Now appears euvolemic in clinic today, resolved.       Reflux esophagitis     Urinary tract infection     Vaginal infection     Vaginal Pap smear 2014    Pap/Hpv Negative        Past Surgical History:   Procedure Laterality Date    APPENDECTOMY      BREAST SURGERY      Tags    CARPAL TUNNEL RELEASE Bilateral 2017    ,     CLOSURE, COLOSTOMY Left 2023    Procedure: CLOSURE, COLOSTOMY;  Surgeon: Manuel Javier MD;  Location: [facility] OR;  Service: General;  Laterality: Left;    COLONOSCOPY  2018    Normal  (Cornell Velazco)     COLOSTOMY Right 2023    Procedure: CREATION, COLOSTOMY;  Surgeon: Manuel Javier MD;  Location: [facility] OR;  Service: General;  Laterality: Right;    CORONARY ANGIOGRAPHY N/A 2024    Procedure: ANGIOGRAM, CORONARY ARTERY;  Surgeon: Todd Carlisle MD;  Location: [facility] CATH LAB;  Service: Cardiology;  Laterality: N/A;    DILATION AND CURETTAGE OF UTERUS  1986    DUPUYTREN CONTRACTURE RELEASE Bilateral 2017    ESOPHAGOGASTRODUODENOSCOPY N/A 2024    Procedure: EGD (ESOPHAGOGASTRODUODENOSCOPY);  Surgeon: Yamilet Walters MD;  Location: [facility] ENDO ([facility] FLR);  Service:  Gastroenterology;  Laterality: N/A;    ESOPHAGOGASTRODUODENOSCOPY N/A 1/26/2024    Procedure: EGD (ESOPHAGOGASTRODUODENOSCOPY);  Surgeon: Yamilet Walters MD;  Location: [facility] ENDO (2ND FLR);  Service: Gastroenterology;  Laterality: N/A;    HYSTERECTOMY  1987    BEAU w/ appy, no BSO     IMPLANTATION OF LEADLESS PACEMAKER N/A 1/16/2024    Procedure: INSERTION, CARDIAC PACEMAKER, LEADLESS;  Surgeon: LENIN Frances MD;  Location: [facility] EP LAB;  Service: Cardiology;  Laterality: N/A;  SB, LEADLESS PPM (MICRA), MDT, ANES, EH, CVICU [id number]    INJECTION OF NEUROLYTIC AGENT AROUND LUMBAR SYMPATHETIC NERVE N/A 1/6/2022    Procedure: BLOCK, LUMBAR SYMPATHETIC;  Surgeon: Souleymane Rizo Jr., MD;  Location: [facility] PAIN MGT;  Service: Pain Management;  Laterality: N/A;  no pacemaker   pt is diabetic     INJECTION OF NEUROLYTIC AGENT AROUND LUMBAR SYMPATHETIC NERVE N/A 8/25/2022    Procedure: BLOCK, LUMBAR SYMPATHETIC;  Surgeon: Souleymane Rizo Jr., MD;  Location: [facility] PAIN MGT;  Service: Pain Management;  Laterality: N/A;  diabetic     INSERTION OF TUNNELED CENTRAL VENOUS HEMODIALYSIS CATHETER Right 1/25/2023    Procedure: INSERTION, CATHETER, HEMODIALYSIS, DUAL LUMEN;  Surgeon: Manuel Javier MD;  Location: [facility] OR;  Service: General;  Laterality: Right;    INSERTION, CATHETER, TUNNELED Left 1/28/2023    Procedure: Insertion,catheter,tunneled;  Surgeon: Watson Fontenot MD;  Location: [facility] OR;  Service: General;  Laterality: Left;    LAPAROTOMY, EXPLORATORY N/A 1/14/2023    Procedure: LAPAROTOMY, EXPLORATORY;  Surgeon: Manuel Javier MD;  Location: [facility] OR;  Service: General;  Laterality: N/A;    LAPAROTOMY, EXPLORATORY N/A 1/16/2023    Procedure: LAPAROTOMY, EXPLORATORY;  Surgeon: Manuel Javier MD;  Location: [facility] OR;  Service: General;  Laterality: N/A;    LEFT HEART CATHETERIZATION Left 2/21/2024    Procedure: Left heart cath;  Surgeon: Todd Carlisle MD;  Location: [facility] CATH LAB;  Service: Cardiology;   Laterality: Left;    LYMPHADENECTOMY Right 1/2/2024    Procedure: LYMPHADENECTOMY;  Surgeon: Tan Thomson MD;  Location: [facility] OR 2ND FLR;  Service: Cardiothoracic;  Laterality: Right;    PACEMAKER, TEMPORARY, TRANSVENOUS, PEDIATRIC Right 1/12/2024    Procedure: Pacemaker, Temporary, Transvenous;  Surgeon: Todd Carlisle MD;  Location: [facility] CATH LAB;  Service: Cardiology;  Laterality: Right;    REMOVAL OF CATHETER Right 1/28/2023    Procedure: REMOVAL, CATHETER;  Surgeon: Watson Fontenot MD;  Location: [facility] OR;  Service: General;  Laterality: Right;    REMOVAL, TUNNELED CATH Right 1/25/2023    Procedure: REMOVAL, TUNNELED CATH;  Surgeon: Manuel Javier MD;  Location: [facility] OR;  Service: General;  Laterality: Right;    ROBOTIC BRONCHOSCOPY N/A 10/20/2023    Procedure: ROBOTIC BRONCHOSCOPY;  Surgeon: Mayda Monzon MD;  [facility];  Service: Pulmonary;  Laterality: N/A;    SHOULDER SURGERY  2009 & 2010    right rotator cuff    THORACOTOMY Right 1/2/2024    Procedure: THORACOTOMY;  Surgeon: Tan Thomson MD;  [facility];  Service: Cardiothoracic;  Laterality: Right;    THORACOTOMY, WITH INITIAL THERAPEUTIC WEDGE RESECTION OF LUNG Right 1/2/2024    Procedure: THORACOTOMY, WITH INITIAL THERAPEUTIC WEDGE RESECTION OF LUNG;  Surgeon: Tan Thomson MD;  [facility];  Service: Cardiothoracic;  Laterality: Right;    TONSILLECTOMY      UPPER GASTROINTESTINAL ENDOSCOPY  04/2018       Family History:  Family History   Problem Relation Name Age of Onset    Vision loss Father Shin Cervantes Jr.     Arthritis Father Shin Cervantes Jr.     Breast cancer Mother Megan Helen     Cancer Mother Megan Bordenman     Vision loss Mother Megan Cervantes     Hyperlipidemia Sister Sweetie Morangreg     Breast cancer Paternal Aunt Ibis Redmarshall     Cancer Paternal Aunt bIis Durbin     Diabetes Paternal Grandfather Shin Cervantes Sr.     Vision loss Sister Noreen Morangreg      Kidney disease Neg Hx         Social History:  She  reports that she has never smoked. She has never used smokeless tobacco. She reports that she does not drink alcohol and does not use drugs.      MEDICATIONS & ALLERGIES:     Review of patient's allergies indicates:   Allergen Reactions    Crestor [rosuvastatin] Swelling    Gluten protein        Current Outpatient Medications on File Prior to Visit   Medication Sig Dispense Refill    albuterol-ipratropium (DUO-NEB) 2.5 mg-0.5 mg/3 mL nebulizer solution Take 3 mLs by nebulization every 6 (six) hours as needed for Wheezing. Rescue 240 mL 11    amiodarone (PACERONE) 200 MG Tab Take 1 tablet (200 mg total) by mouth 2 (two) times daily. 180 tablet 3    aspirin (ECOTRIN) 81 MG EC tablet Take 1 tablet (81 mg total) by mouth once daily. 90 tablet 3    atorvastatin (LIPITOR) 40 MG tablet Take 1 tablet (40 mg total) by mouth every evening. 90 tablet 3    azelastine (ASTELIN) 137 mcg (0.1 %) nasal spray 2 sprays (274 mcg total) by Nasal route 2 (two) times daily. 30 mL 6    calcium-vitamin D3 (OS-CIPRIANO 500 + D3) 500 mg-5 mcg (200 unit) per tablet Take 1 tablet by mouth once daily. 30 tablet 3    carvediloL (COREG) 6.25 MG tablet Take 1 tablet (6.25 mg total) by mouth 2 (two) times daily with meals. 180 tablet 3    dicyclomine (BENTYL) 10 MG capsule Take 10 mg by mouth 3 (three) times daily.      insulin aspart U-100 (NOVOLOG) 100 unit/mL (3 mL) InPn pen Inject before meals:   201-250=+2, 251-300=+3; 301-350=+4, over 350=+5 units 15 mL 0    levothyroxine (SYNTHROID) 112 MCG tablet Take 1 tablet (112 mcg total) by mouth before breakfast. 90 tablet 3    linaGLIPtin (TRADJENTA) 5 mg Tab tablet Take 1 tablet (5 mg total) by mouth once daily. 90 tablet 1    loperamide (IMODIUM) 2 mg capsule Take 2 mg by mouth once as needed for Diarrhea.      meclizine (ANTIVERT) 25 mg tablet Take 1 tablet (25 mg total) by mouth 3 (three) times daily as needed for Dizziness. 90 tablet 3    midodrine  "(PROAMATINE) 10 MG tablet 10 mg 2 (two) times a day.      ondansetron (ZOFRAN) 4 MG tablet Take 4 mg by mouth every 4 (four) hours as needed for Nausea.      pantoprazole (PROTONIX) 40 MG tablet Take 1 tablet (40 mg total) by mouth 2 (two) times daily before meals. 180 tablet 3    pregabalin (LYRICA) 75 MG capsule Take 1 capsule (75 mg total) by mouth every other day. Give after HD 45 capsule 2    MELLISSA-BENJY RX 1- mg-mg-mcg Tab Take 1 tablet by mouth.      torsemide (DEMADEX) 20 MG Tab Take 1 tablet (20 mg total) by mouth 2 (two) times daily. 180 tablet 3    valsartan (DIOVAN) 40 MG tablet Take 0.5 tablets (20 mg total) by mouth 2 (two) times daily. 30 tablet 11    venlafaxine (EFFEXOR) 37.5 MG Tab Take 1 tablet (37.5 mg total) by mouth once daily. 90 tablet 3    vit C,P-Du-zqhku-lutein-zeaxan (PRESERVISION AREDS 2) [id number] mg-unit-mg-mg Cap       betahistine HCl (BETAHISTINE, BULK, MISC)       blood sugar diagnostic (TRUE METRIX GLUCOSE TEST STRIP) Strp USE ONE STRIP FOR TESTING 2 TIMES A  each 3    blood-glucose meter kit Use to test twice a day 1 each 0    ferrous gluconate (FERGON) 324 MG tablet Take 1 tablet (324 mg total) by mouth daily with breakfast. (Patient not taking: Reported on 5/24/2024) 90 tablet 3    lancets Misc 1 lancet by Misc.(Non-Drug; Combo Route) route 4 (four) times daily. 400 each 3    pen needle, diabetic (BD ULTRA-FINE MARIS PEN NEEDLE) 32 gauge x 5/32" Ndle 1 Device by Misc.(Non-Drug; Combo Route) route 2 (two) times a day. 200 each 3     No current facility-administered medications on file prior to visit.        OBJECTIVE:     Vital Signs:  BP (!) 102/40   Pulse 66   Ht 5' 5" (1.651 m)   Wt 50.8 kg (111 lb 15.9 oz)   LMP  (LMP Unknown) Comment: BEAU/ NO BSO  1986  BMI 18.64 kg/m²     Physical Exam:  Physical Exam  Constitutional:       General: She is not in acute distress.     Appearance: Normal appearance. She is ill-appearing. She is not diaphoretic.      Comments: " Chronically-ill appearing, elderly, thin woman in NAD, presenting in a wheelchair.    HENT:      Head: Normocephalic and atraumatic.      Nose: Nose normal.      Mouth/Throat:      Mouth: Mucous membranes are moist.      Pharynx: Oropharynx is clear.   Eyes:      Pupils: Pupils are equal, round, and reactive to light.   Cardiovascular:      Rate and Rhythm: Normal rate and regular rhythm.      Pulses: Normal pulses.      Heart sounds: Normal heart sounds. No murmur heard.     No friction rub. No gallop.      Comments: Right anterior chest port.  Pulmonary:      Effort: Pulmonary effort is normal. No respiratory distress.      Breath sounds: Normal breath sounds. No wheezing, rhonchi or rales.   Chest:      Chest wall: No tenderness.   Abdominal:      General: There is no distension.      Palpations: Abdomen is soft.      Tenderness: There is no abdominal tenderness.   Musculoskeletal:         General: No swelling or tenderness.      Cervical back: Normal range of motion.      Right lower leg: Edema present.      Left lower leg: Edema present.      Comments: Left upper extremity fistula. Trace bilateral pedal edema.    Skin:     General: Skin is warm and dry.      Findings: No erythema, lesion or rash.   Neurological:      General: No focal deficit present.      Mental Status: She is alert and oriented to person, place, and time. Mental status is at baseline.      Motor: No weakness.      Gait: Gait normal.   Psychiatric:         Mood and Affect: Mood normal.         Behavior: Behavior normal.          Laboratory Data:  Lab Results   Component Value Date    WBC 12.84 (H) 05/11/2024    HGB 9.1 (L) 05/11/2024    HCT 30.9 (L) 05/11/2024    MCV 91 05/11/2024     05/11/2024     Lab Results   Component Value Date     (H) 05/11/2024     05/11/2024    K 3.2 (L) 05/11/2024    CL 97 05/11/2024    CO2 25 05/11/2024    BUN 16 05/11/2024    CREATININE 4.0 (H) 05/11/2024    CALCIUM 10.2 05/11/2024    MG 2.0  03/29/2024     Lab Results   Component Value Date    INR 1.3 (H) 01/23/2024    INR 1.1 01/22/2024    INR 1.2 01/12/2024       Pertinent Cardiac Data:  ECG: Normal sinus rhythm with rate of 66 bpm,  ms,  ms, QT/QTc 416/436 ms, nonspecific STT changes.     Resting 2D Transthoracic Echocardiogram - 1/14/2023:  Concentric hypertrophy and  Normal right ventricular size with normal right ventricular systolic function.  The estimated ejection fraction is 35%.  Grade I left ventricular diastolic dysfunction.  Mild aortic regurgitation.  Mild mitral regurgitation.  Mild tricuspid regurgitation.  There is no pulmonary hypertension.    Pharmacologic Nuclear Cardiac Stress Test - SPECT - 11/22/2023:    Normal myocardial perfusion scan. There is no evidence of myocardial ischemia or infarction.    The LVEF is not accurate due to poor software boundary tracking at rest  and poor software boundary tracking during stress. The visually estimated ejection fraction is normal at rest and normal during stress.    There is mild global hypokinesis at rest and stress.    LV cavity size is normal at rest and normal at stress.    The ECG portion of the study is negative for ischemia.    The patient reported no chest pain during the stress test.    There were no arrhythmias during stress.    There are no prior studies for comparison.    Resting 2D Transthoracic Echocardiogram - 1/6/2024:    Left Ventricle: The left ventricle is normal in size. Normal wall thickness. Normal wall motion. There is normal systolic function with a visually estimated ejection fraction of 60 - 65%. There is normal diastolic function.    Right Ventricle: Normal right ventricular cavity size. Wall thickness is normal. Right ventricle wall motion  is normal. Systolic function is normal.    Aortic Valve: The aortic valve is a trileaflet valve. There is moderate aortic valve sclerosis. There is annular calcification present. Mildly restricted motion. There  is mild aortic regurgitation.    Mitral Valve: There is mild regurgitation.    Pulmonary Artery: The estimated pulmonary artery systolic pressure is 32 mmHg.    IVC/SVC: Normal venous pressure at 3 mmHg.    Pericardium: There is a small posterior effusion. Ascites present.    Resting 2D Transthoracic Echocardiogram - 1/12/2024:    Left Ventricle: The left ventricle is normal in size. Normal wall thickness. Normal wall motion. There is normal systolic function with a visually estimated ejection fraction of 60 - 65%. There is normal diastolic function.    Right Ventricle: Normal right ventricular cavity size. Wall thickness is normal. Right ventricle wall motion  is normal. Systolic function is normal.    Aortic Valve: The aortic valve is a trileaflet valve. There is moderate aortic valve sclerosis. There is mild annular calcification present. Mildly restricted motion. There is mild aortic regurgitation.    Mitral Valve: There is mild to moderate regurgitation.    Tricuspid Valve: The tricuspid valve is structurally normal. There is trace regurgitation.  No evidence of carcinoid disease of the tricuspid valve.    Pulmonary Artery: There is borderline elevated pulmonary hypertension. The estimated pulmonary artery systolic pressure is 34 mmHg.    IVC/SVC: Intermediate venous pressure at 8 mmHg.    Large left pleural effusion.    Pericardium: There is a moderate circumferential effusion, largest anterior to the tricuspid valve annulus. Pericardial effusion has focal strands. No indication of cardiac tamponade. Evidence includes no IVC dilation, no chamber collapse.    Resting 2D Transthoracic Echocardiogram - 2/12/2024:    Left Ventricle: The left ventricle is normal in size. Normal wall thickness. Regional wall motion abnormalities present. See diagram for wall motion findings. There is severely reduced systolic function with a visually estimated ejection fraction of 15 - 20%. Ejection fraction by visual approximation  is 15%. Unable to assess diastolic function due to atrial fibrillation.    Right Ventricle: Normal right ventricular cavity size. Wall thickness is normal. Right ventricle wall motion  is normal. Systolic function is normal.    Left Atrium: Left atrium is severely dilated.    Right Atrium: Right atrium is mildly dilated.    Aortic Valve: There is moderate aortic valve sclerosis. Mildly restricted motion.    Mitral Valve: There is moderate to severe regurgitation.    Tricuspid Valve: There is mild regurgitation.    IVC/SVC: Intermediate venous pressure at 8 mmHg.    Pericardium: There is a trivial effusion. Left pleural effusion.    Resting 2D Transthoracic Echocardiogram - 2/14/2024:    Left Ventricle: The left ventricle is normal in size. Normal wall thickness. Normal wall motion. There is severely reduced systolic function with a visually estimated ejection fraction of 20 - 25%. There is diastolic dysfunction.    Right Ventricle: Normal right ventricular cavity size. Wall thickness is normal. Right ventricle wall motion has segmental dysfunction with akinesis of the mid to distal free wall. Systolic function is moderately reduced.    Left Atrium: Left atrium is moderately dilated.    Aortic Valve: Aortic valve thickening. There is moderate aortic valve sclerosis. There is annular calcification present.    IVC/SVC: Normal venous pressure at 3 mmHg.    Pericardium: Left pleural effusion.    Coronary Angiogram - 2/21/2024:    Non-obstructive CAD    Non-ischemic cardiomyopathy    The estimated blood loss was <50 mL.    Resting 2D Transthoracic Echocardiogram - 5/15/2024:    Left Ventricle: The left ventricle is normal in size. Ventricular mass is normal. Severely increased wall thickness. There is concentric remodeling. Normal wall motion. There is normal systolic function with a visually estimated ejection fraction of 55 - 60%. Ejection fraction by visual approximation is 58%. There is indeterminate diastolic  function.    Right Ventricle: Normal right ventricular cavity size. Wall thickness is normal. Systolic function is normal.    Left Atrium: Left atrium is severely dilated.    Right Atrium: Right atrium is moderately dilated.    Aortic Valve: There is moderate aortic valve sclerosis. At most slightly restricted motion. Aortic valve peak velocity is 1.68 m/s. Mean gradient is 6 mmHg. The IVANNA is low normal to slightly reduced at 2.1. There is mild aortic regurgitation.    Mitral Valve: There is mild-moderate to moderate regurgitation.    Tricuspid Valve: There is mild regurgitation.    Pulmonary Artery: The estimated pulmonary artery systolic pressure is 36 mmHg.    IVC/SVC: Normal venous pressure at 3 mmHg.    Device Interrogation: Personal review of her device interrogation report (Medtronic Micra AV leadless pacemaker, implanted 1/16/2024) reveals adequate battery longevity with an estimated >8 years of battery life remaining. She is presently in underlying sinus rhythm with rates of 65bpm and is AS-VS. Her electrode was interrogated with acceptable and stable lead parameters. She is VS 96.5%,  3.5%. There are no concerning VHR episodes noted.         ASSESSMENT & PLAN:   Ms. Lily Green is a disha 73-year-old woman who returns to clinic today for ongoing evaluation and routine device surveillance of a Medtronic Micra AV leadless pacemaker, implanted on 1/16/2024 in the setting of tachycardia-bradycardia syndrome with persistent atrial fibrillation and periods of symptomatic bradycardia with prolonged pauses. She has a past medical history significant for tachycardia-bradycardia syndrome with persistent atrial fibrillation and periods of symptomatic bradycardia, a history of nonischemic cardiomyopathy with her most recent LVEF 55-60%, hypertension, hyperlipidemia, type II diabetes mellitus, celiac disease, a right middle lobe carcinoid tumor s/p robotic-assisted converted to right thoracotomy with  therapeutic wedge resection and mediastinal lymph node dissection and repair of bleeding right inferior pulmonary vein on 1/2/2024, end-stage renal disease maintained on hemodialysis via a left AV fistula, peptic ulcer disease, a history of peritonitis due to colonic ischemia s/p a total colectomy and end ileostomy creation on 1/14/2023, evacuation of an intraabdominal hematoma and takedown of left lateral ileostomy and recreation of right lateral end ileostomy on 1/16/2023, elective robotic ileostomy reversal and ileocolonic anastomosis on 8/7/2023, GERD, anxiety, depression, and low BMI.     - She has a normally functioning Medtronic Micra AV leadless pacemaker on device interrogation today, with underlying sinus rhythm. She remains very rarely paced from the RV, 3.5%. Her intrinsic QRSd is narrow 100ms. She continues to have preserved systolic function, LVEF 55-60%, with continued efforts to prevent indwelling pacemaker leads in the setting of her ongoing need for hemodialysis and limited vascular access. There is no present indication for device upgrade.    - She was provided with a remote monitor today in order to start sending remote transmissions. She will return for her next in-office interrogation in six months for ongoing surveillance.   - We discussed her paroxysmal atrial fibrillation. She has not had any evidence of recurrent atrial fibrillation on serial ECGs since 2/12/2024, with no events recorded on her pacemaker interrogation today. She was previously maintained on apixaban for oral anticoagulation; however, this was discontinued at a prior hospitalization in the setting of a GI bleed and need for frequent procedures with IR and hemodialysis access. Her [id number] is elevated at 6, portending an annual adjusted risk of CVA of 9.8%. We discussed that should she evidence recurrent atrial fibrillation, we will need to discuss resumption of short-term apixaban 2.5mg po BID with consideration for  implantation of a WATCHMAN left atrial appendage occlusion device given her history of GI bleeding.     - She remains on amiodarone 200mg po daily and carvedilol 6.25mg po BID with no evidence of recurrent atrial fibrillation.   - She will continue to follow with her PCP, hematology/oncology team, and general cardiologist for ongoing management of her comorbid medical conditions. Her blood pressure remains at goal on her current medication regimen.       This patient will return to clinic with me in six months with continued remote transmissions in the interim. All questions and concerns were addressed at this encounter.    Signing Physician:       MELQUIADES Frances MD  Electrophysiology Attending

## 2024-05-26 LAB
OHS CV DC REMOTE DEVICE TYPE: NORMAL
OHS CV RV PACING PERCENT: 3.5 %

## 2024-05-28 ENCOUNTER — OFFICE VISIT (OUTPATIENT)
Dept: URGENT CARE | Facility: CLINIC | Age: 74
End: 2024-05-28
Payer: MEDICARE

## 2024-05-28 VITALS
DIASTOLIC BLOOD PRESSURE: 64 MMHG | OXYGEN SATURATION: 97 % | TEMPERATURE: 97 F | HEIGHT: 65 IN | SYSTOLIC BLOOD PRESSURE: 116 MMHG | HEART RATE: 62 BPM | RESPIRATION RATE: 20 BRPM | WEIGHT: 112.44 LBS | BODY MASS INDEX: 18.73 KG/M2

## 2024-05-28 DIAGNOSIS — R19.7 DIARRHEA, UNSPECIFIED TYPE: Primary | ICD-10-CM

## 2024-05-28 DIAGNOSIS — R31.9 URINARY TRACT INFECTION WITH HEMATURIA, SITE UNSPECIFIED: ICD-10-CM

## 2024-05-28 DIAGNOSIS — R30.0 DYSURIA: Primary | ICD-10-CM

## 2024-05-28 DIAGNOSIS — N39.0 URINARY TRACT INFECTION WITH HEMATURIA, SITE UNSPECIFIED: ICD-10-CM

## 2024-05-28 DIAGNOSIS — R19.7 DIARRHEA, UNSPECIFIED TYPE: ICD-10-CM

## 2024-05-28 LAB
BILIRUB UR QL STRIP: POSITIVE
GLUCOSE UR QL STRIP: NEGATIVE
KETONES UR QL STRIP: NEGATIVE
LEUKOCYTE ESTERASE UR QL STRIP: POSITIVE
PH, POC UA: 6 (ref 5–8)
POC BLOOD, URINE: POSITIVE
POC NITRATES, URINE: NEGATIVE
PROT UR QL STRIP: POSITIVE
SP GR UR STRIP: 1.02 (ref 1–1.03)
UROBILINOGEN UR STRIP-ACNC: 1 (ref 0.1–1.1)

## 2024-05-28 PROCEDURE — 87186 SC STD MICRODIL/AGAR DIL: CPT | Performed by: PHYSICIAN ASSISTANT

## 2024-05-28 PROCEDURE — 99214 OFFICE O/P EST MOD 30 MIN: CPT | Mod: S$GLB,,, | Performed by: PHYSICIAN ASSISTANT

## 2024-05-28 PROCEDURE — 81003 URINALYSIS AUTO W/O SCOPE: CPT | Mod: QW,S$GLB,, | Performed by: PHYSICIAN ASSISTANT

## 2024-05-28 PROCEDURE — 87077 CULTURE AEROBIC IDENTIFY: CPT | Performed by: PHYSICIAN ASSISTANT

## 2024-05-28 PROCEDURE — 87088 URINE BACTERIA CULTURE: CPT | Performed by: PHYSICIAN ASSISTANT

## 2024-05-28 PROCEDURE — 87086 URINE CULTURE/COLONY COUNT: CPT | Performed by: PHYSICIAN ASSISTANT

## 2024-05-28 RX ORDER — AMOXICILLIN AND CLAVULANATE POTASSIUM 500; 125 MG/1; MG/1
1 TABLET, FILM COATED ORAL DAILY
Qty: 5 TABLET | Refills: 0 | Status: SHIPPED | OUTPATIENT
Start: 2024-05-28 | End: 2024-06-02

## 2024-05-28 RX ORDER — AMOXICILLIN AND CLAVULANATE POTASSIUM 875; 125 MG/1; MG/1
1 TABLET, FILM COATED ORAL EVERY 12 HOURS
Qty: 20 TABLET | Refills: 0 | Status: SHIPPED | OUTPATIENT
Start: 2024-05-28 | End: 2024-05-28

## 2024-05-28 NOTE — PATIENT INSTRUCTIONS
FIVE DAYS ANTIBIOTICS WAS ORDERED BUT IF YOUR SYMPTOMS ARE COMPLETELY RESOLVED ON DAY 3 IT IS OKAY TO STOP THE ORAL ANTIBIOTICS AFTER 3 DAYS OR 3 DOSES.    CALL YOUR PRIMARY CARE PROVIDER AS WE DISCUSSED TO INFORM HIM OR HER OF THE DIARRHEA AND YOUR NEW DIAGNOSIS OF UTI

## 2024-05-28 NOTE — PROGRESS NOTES
"Subjective:      Patient ID: Lily Green is a 73 y.o. female.    Vitals:  height is 5' 5" (1.651 m) and weight is 51 kg (112 lb 7 oz). Her tympanic temperature is 97.4 °F (36.3 °C). Her blood pressure is 116/64 and her pulse is 62. Her respiration is 20 and oxygen saturation is 97%.     Chief Complaint: Dysuria    Pt complains of dysuria that started yesterday. Pt denies fever.  Patient with complicated history on hemodialysis.  She complains of dysuria for 2 days.  She states she has been having watery diarrhea for the last 4-5 weeks following hospital stay and skilled nursing facility stay.  She states Imodium gives him any relief.  No fever chills.  Patient is on dialysis states she does make urine but not very much.    Dysuria   This is a new problem. The current episode started yesterday. The problem occurs every urination. The problem has been gradually worsening. The quality of the pain is described as burning. The pain is at a severity of 2/10. The pain is mild. There has been no fever. Pertinent negatives include no hematuria. She has tried nothing for the symptoms.       Constitution: Negative for fever.   Genitourinary:  Positive for dysuria. Negative for hematuria.   Musculoskeletal:  Negative for back pain.   Skin:  Negative for erythema.      Objective:     Physical Exam   Constitutional: She is oriented to person, place, and time. She appears well-developed. She is cooperative.  Non-toxic appearance. She does not appear ill. No distress.   HENT:   Head: Normocephalic and atraumatic.   Ears:   Right Ear: Hearing, tympanic membrane, external ear and ear canal normal.   Left Ear: Hearing, tympanic membrane, external ear and ear canal normal.   Nose: Nose normal. No mucosal edema, rhinorrhea or nasal deformity. No epistaxis. Right sinus exhibits no maxillary sinus tenderness and no frontal sinus tenderness. Left sinus exhibits no maxillary sinus tenderness and no frontal sinus tenderness. "   Mouth/Throat: Uvula is midline, oropharynx is clear and moist and mucous membranes are normal. No trismus in the jaw. Normal dentition. No uvula swelling. No oropharyngeal exudate, posterior oropharyngeal edema or posterior oropharyngeal erythema.   Eyes: Conjunctivae, EOM and lids are normal. Pupils are equal, round, and reactive to light. Right eye exhibits no discharge. Left eye exhibits no discharge. No scleral icterus.   Neck: Trachea normal and phonation normal. Neck supple. No JVD present. No tracheal deviation present. No thyromegaly present. No edema present. No erythema present. No neck rigidity present.   Cardiovascular: Normal rate, regular rhythm, normal heart sounds and normal pulses.   No murmur heard.Exam reveals no gallop and no friction rub.   Pulmonary/Chest: Effort normal and breath sounds normal. No stridor. No respiratory distress. She has no decreased breath sounds. She has no wheezes. She has no rhonchi. She has no rales. She exhibits no tenderness.   Abdominal: Normal appearance. She exhibits no distension. Soft. There is no abdominal tenderness. There is no rebound and no guarding.   Musculoskeletal: Normal range of motion.         General: No deformity. Normal range of motion.   Neurological: She is alert and oriented to person, place, and time. She exhibits normal muscle tone. Coordination normal.   Skin: Skin is warm, dry, intact, not diaphoretic, not pale and no rash. Capillary refill takes less than 2 seconds. No erythema   Psychiatric: Her speech is normal and behavior is normal. Judgment and thought content normal.   Nursing note and vitals reviewed.    Results for orders placed or performed in visit on 05/28/24   POCT Urinalysis, Dipstick, Automated, W/O Scope   Result Value Ref Range    POC Blood, Urine Positive (A) Negative    POC Bilirubin, Urine Positive (A) Negative    POC Urobilinogen, Urine 1.0 0.1 - 1.1    POC Ketones, Urine Negative Negative    POC Protein, Urine Positive  (A) Negative    POC Nitrates, Urine Negative Negative    POC Glucose, Urine Negative Negative    pH, UA 6.0 5 - 8    POC Specific Gravity, Urine 1.020 1.003 - 1.029    POC Leukocytes, Urine Positive (A) Negative     I sent patient's primary care provider a message for antibiotic dose for UTI as well as suggesting him to probably order stool studies  Assessment:     1. Dysuria    2. Urinary tract infection with hematuria, site unspecified        Plan:       Dysuria  -     POCT Urinalysis, Dipstick, Automated, W/O Scope  -     Discontinue: amoxicillin-clavulanate 875-125mg (AUGMENTIN) 875-125 mg per tablet; Take 1 tablet by mouth every 12 (twelve) hours.  Dispense: 20 tablet; Refill: 0  -     amoxicillin-clavulanate 500-125mg (AUGMENTIN) 500-125 mg Tab; Take 1 tablet (500 mg total) by mouth once daily. for 5 days  Dispense: 5 tablet; Refill: 0  -     CULTURE, URINE    Urinary tract infection with hematuria, site unspecified  -     Discontinue: amoxicillin-clavulanate 875-125mg (AUGMENTIN) 875-125 mg per tablet; Take 1 tablet by mouth every 12 (twelve) hours.  Dispense: 20 tablet; Refill: 0  -     amoxicillin-clavulanate 500-125mg (AUGMENTIN) 500-125 mg Tab; Take 1 tablet (500 mg total) by mouth once daily. for 5 days  Dispense: 5 tablet; Refill: 0  -     CULTURE, URINE      Follow up if symptoms worsen or fail to improve, for F/U with PCP or ED.   Patient Instructions   FIVE DAYS ANTIBIOTICS WAS ORDERED BUT IF YOUR SYMPTOMS ARE COMPLETELY RESOLVED ON DAY 3 IT IS OKAY TO STOP THE ORAL ANTIBIOTICS AFTER 3 DAYS OR 3 DOSES.    CALL YOUR PRIMARY CARE PROVIDER AS WE DISCUSSED TO INFORM HIM OR HER OF THE DIARRHEA AND YOUR NEW DIAGNOSIS OF UTI

## 2024-05-30 PROBLEM — Z74.09 IMPAIRED FUNCTIONAL MOBILITY AND ACTIVITY TOLERANCE: Status: ACTIVE | Noted: 2024-05-30

## 2024-06-02 ENCOUNTER — TELEPHONE (OUTPATIENT)
Dept: URGENT CARE | Facility: CLINIC | Age: 74
End: 2024-06-02
Payer: MEDICARE

## 2024-06-02 DIAGNOSIS — N30.01 ACUTE CYSTITIS WITH HEMATURIA: Primary | ICD-10-CM

## 2024-06-02 LAB — BACTERIA UR CULT: ABNORMAL

## 2024-06-02 RX ORDER — CIPROFLOXACIN 500 MG/1
500 TABLET ORAL DAILY
Qty: 7 TABLET | Refills: 0 | Status: SHIPPED | OUTPATIENT
Start: 2024-06-02 | End: 2024-06-09

## 2024-06-02 NOTE — TELEPHONE ENCOUNTER
I spoke to the patient this morning who states she is feeling much better.  I informed the cultures back and she should continue the antibiotics as prescribed and finish the prescription..  She expressed understanding.   Initially hours in the impression from the earlier culture report that she was sensitive to the Augmentin but after a 2nd reports afternoon shows that she was resistant to Augmentin but is sensitive to Cipro.  I did call her again and discuss with her so I will change her to Cipro 500 p.o. daily the 7 days.  I advised her or instructed her to stop the Augmentin.  The reason doses lower is because she is on hemodialysis.  She expressed understanding and I answered her questions.

## 2024-06-24 ENCOUNTER — CLINICAL SUPPORT (OUTPATIENT)
Dept: CARDIOLOGY | Facility: HOSPITAL | Age: 74
End: 2024-06-24
Attending: STUDENT IN AN ORGANIZED HEALTH CARE EDUCATION/TRAINING PROGRAM
Payer: MEDICARE

## 2024-06-24 ENCOUNTER — CLINICAL SUPPORT (OUTPATIENT)
Dept: CARDIOLOGY | Facility: HOSPITAL | Age: 74
End: 2024-06-24
Payer: MEDICARE

## 2024-06-24 DIAGNOSIS — Z95.0 PRESENCE OF CARDIAC PACEMAKER: ICD-10-CM

## 2024-06-24 DIAGNOSIS — R00.1 BRADYCARDIA, UNSPECIFIED: ICD-10-CM

## 2024-06-24 PROCEDURE — 93294 REM INTERROG EVL PM/LDLS PM: CPT | Mod: ,,, | Performed by: INTERNAL MEDICINE

## 2024-06-24 PROCEDURE — 93296 REM INTERROG EVL PM/IDS: CPT | Performed by: INTERNAL MEDICINE

## 2024-06-28 ENCOUNTER — OFFICE VISIT (OUTPATIENT)
Dept: PODIATRY | Facility: CLINIC | Age: 74
End: 2024-06-28
Payer: MEDICARE

## 2024-06-28 VITALS — WEIGHT: 112.44 LBS | BODY MASS INDEX: 22.07 KG/M2 | HEIGHT: 60 IN

## 2024-06-28 DIAGNOSIS — B35.1 ONYCHOMYCOSIS DUE TO DERMATOPHYTE: ICD-10-CM

## 2024-06-28 DIAGNOSIS — E11.42 DIABETIC POLYNEUROPATHY ASSOCIATED WITH TYPE 2 DIABETES MELLITUS: Primary | ICD-10-CM

## 2024-06-28 DIAGNOSIS — M20.41 HAMMER TOES OF BOTH FEET: ICD-10-CM

## 2024-06-28 DIAGNOSIS — M20.42 HAMMER TOES OF BOTH FEET: ICD-10-CM

## 2024-06-28 DIAGNOSIS — L84 CORN OR CALLUS: ICD-10-CM

## 2024-06-28 PROCEDURE — 1159F MED LIST DOCD IN RCRD: CPT | Mod: CPTII,S$GLB,, | Performed by: PODIATRIST

## 2024-06-28 PROCEDURE — 3066F NEPHROPATHY DOC TX: CPT | Mod: CPTII,S$GLB,, | Performed by: PODIATRIST

## 2024-06-28 PROCEDURE — 11056 PARNG/CUTG B9 HYPRKR LES 2-4: CPT | Mod: Q9,S$GLB,, | Performed by: PODIATRIST

## 2024-06-28 PROCEDURE — 1125F AMNT PAIN NOTED PAIN PRSNT: CPT | Mod: CPTII,S$GLB,, | Performed by: PODIATRIST

## 2024-06-28 PROCEDURE — 1157F ADVNC CARE PLAN IN RCRD: CPT | Mod: CPTII,S$GLB,, | Performed by: PODIATRIST

## 2024-06-28 PROCEDURE — 1101F PT FALLS ASSESS-DOCD LE1/YR: CPT | Mod: CPTII,S$GLB,, | Performed by: PODIATRIST

## 2024-06-28 PROCEDURE — 99213 OFFICE O/P EST LOW 20 MIN: CPT | Mod: 25,S$GLB,, | Performed by: PODIATRIST

## 2024-06-28 PROCEDURE — 3288F FALL RISK ASSESSMENT DOCD: CPT | Mod: CPTII,S$GLB,, | Performed by: PODIATRIST

## 2024-06-28 PROCEDURE — 4010F ACE/ARB THERAPY RXD/TAKEN: CPT | Mod: CPTII,S$GLB,, | Performed by: PODIATRIST

## 2024-06-28 PROCEDURE — 3008F BODY MASS INDEX DOCD: CPT | Mod: CPTII,S$GLB,, | Performed by: PODIATRIST

## 2024-06-28 PROCEDURE — 99999 PR PBB SHADOW E&M-EST. PATIENT-LVL II: CPT | Mod: PBBFAC,,, | Performed by: PODIATRIST

## 2024-06-28 PROCEDURE — 11721 DEBRIDE NAIL 6 OR MORE: CPT | Mod: Q9,59,S$GLB, | Performed by: PODIATRIST

## 2024-06-28 NOTE — PROGRESS NOTES
Subjective:      Patient ID: Lily Green is a 73 y.o. female.    Chief Complaint: Diabetes Mellitus (DM nail care 3 months)      Lily Kline is a 73 y.o. female who presents to the clinic for evaluation and treatment of diabetic feet. Lily Kline has a past medical history of Anxiety, Celiac disease (05/2018), Celiac disease, Depression, Diabetes mellitus, Family history of breast cancer in mother, Hyperlipidemia, Hypertension, Meniere disease, Meniere's disease, unspecified ear, Menopause, On supplemental oxygen by nasal cannula (01/28/2023), Peptic ulcer, Peritonitis (01/14/2023), Pleural effusion on left (01/15/2024), Reflux esophagitis, Urinary tract infection, Vaginal infection, and Vaginal Pap smear (04/07/2014). Relates having toenails toenails and calluses that are in need of trimming.  Denies pain from said issues with today's exam.  Has not attempted to self treat.  States prior neuropathy pain is more manageable.  Feels that no further interventions is warranted.  Denies any additional pedal complaints.    PCP: Pavel Calixto MD    Date Last Seen by PCP: 6/24  Hemoglobin A1C   Date Value Ref Range Status   11/15/2023 4.9 4.0 - 5.6 % Final     Comment:     ADA Screening Guidelines:  5.7-6.4%  Consistent with prediabetes  >or=6.5%  Consistent with diabetes    High levels of fetal hemoglobin interfere with the HbA1C  assay. Heterozygous hemoglobin variants (HbS, HgC, etc)do  not significantly interfere with this assay.   However, presence of multiple variants may affect accuracy.     07/11/2023 5.3 4.0 - 5.6 % Final     Comment:     ADA Screening Guidelines:  5.7-6.4%  Consistent with prediabetes  >or=6.5%  Consistent with diabetes    High levels of fetal hemoglobin interfere with the HbA1C  assay. Heterozygous hemoglobin variants (HbS, HgC, etc)do  not significantly interfere with this assay.   However, presence of multiple variants may affect accuracy.     11/01/2022 5.8 (H) 4.0 - 5.6 % Final      Comment:     ADA Screening Guidelines:  5.7-6.4%  Consistent with prediabetes  >or=6.5%  Consistent with diabetes    High levels of fetal hemoglobin interfere with the HbA1C  assay. Heterozygous hemoglobin variants (HbS, HgC, etc)do  not significantly interfere with this assay.   However, presence of multiple variants may affect accuracy.     10/23/2019 6.8 % Final         Past Medical History:   Diagnosis Date    Anxiety     Celiac disease 2018    Celiac disease     Depression     Diabetes mellitus     Family history of breast cancer in mother      at age 68    Hyperlipidemia     Hypertension     Meniere disease     Meniere's disease, unspecified ear     Menopause     On supplemental oxygen by nasal cannula 2023    Hypoxic respiratory failure resolved.  No oxygen desaturation on walk.        Peptic ulcer     Peritonitis 2023    Pleural effusion on left 01/15/2024    C/w transudate in setting of acute decompensated heart failure due to a-fib and volume overload.  Now appears euvolemic in clinic today, resolved.       Reflux esophagitis     Urinary tract infection     Vaginal infection     Vaginal Pap smear 2014    Pap/Hpv Negative        Past Surgical History:   Procedure Laterality Date    APPENDECTOMY      BREAST SURGERY      Tags    CARPAL TUNNEL RELEASE Bilateral 2017    ,     CLOSURE, COLOSTOMY Left 2023    Procedure: CLOSURE, COLOSTOMY;  Surgeon: Manuel Javier MD;  Location: [facility] OR;  Service: General;  Laterality: Left;    COLONOSCOPY  2018    Normal  (Cornell Lizarragaald)     COLOSTOMY Right 2023    Procedure: CREATION, COLOSTOMY;  Surgeon: Manuel Javier MD;  Location: [facility] OR;  Service: General;  Laterality: Right;    CORONARY ANGIOGRAPHY N/A 2024    Procedure: ANGIOGRAM, CORONARY ARTERY;  Surgeon: Todd Carlisle MD;  Location: [facility] CATH LAB;  Service: Cardiology;  Laterality: N/A;    DILATION AND CURETTAGE OF UTERUS      [place]  CONTRACTURE RELEASE Bilateral 09/2017    ESOPHAGOGASTRODUODENOSCOPY N/A 1/24/2024    Procedure: EGD (ESOPHAGOGASTRODUODENOSCOPY);  Surgeon: Yamilet Walters MD;  Location: [facility] ENDO (2ND FLR);  Service: Gastroenterology;  Laterality: N/A;    ESOPHAGOGASTRODUODENOSCOPY N/A 1/26/2024    Procedure: EGD (ESOPHAGOGASTRODUODENOSCOPY);  Surgeon: Yamilet Walters MD;  Location: [facility] ENDO (2ND FLR);  Service: Gastroenterology;  Laterality: N/A;    HYSTERECTOMY  1987    BEAU w/ appy, no BSO     IMPLANTATION OF LEADLESS PACEMAKER N/A 1/16/2024    Procedure: INSERTION, CARDIAC PACEMAKER, LEADLESS;  Surgeon: LENIN Frances MD;  Location: [facility] EP LAB;  Service: Cardiology;  Laterality: N/A;  SB, LEADLESS PPM (MICRA), MDT, ANES, EH, CVICU [id number]    INJECTION OF NEUROLYTIC AGENT AROUND LUMBAR SYMPATHETIC NERVE N/A 1/6/2022    Procedure: BLOCK, LUMBAR SYMPATHETIC;  Surgeon: Souleymane Rizo Jr., MD;  Location: [facility] PAIN MGT;  Service: Pain Management;  Laterality: N/A;  no pacemaker   pt is diabetic     INJECTION OF NEUROLYTIC AGENT AROUND LUMBAR SYMPATHETIC NERVE N/A 8/25/2022    Procedure: BLOCK, LUMBAR SYMPATHETIC;  Surgeon: Souleymane Rizo Jr., MD;  Location: [facility] PAIN MGT;  Service: Pain Management;  Laterality: N/A;  diabetic     INSERTION OF TUNNELED CENTRAL VENOUS HEMODIALYSIS CATHETER Right 1/25/2023    Procedure: INSERTION, CATHETER, HEMODIALYSIS, DUAL LUMEN;  Surgeon: Manuel Javier MD;  Location: [facility] OR;  Service: General;  Laterality: Right;    INSERTION, CATHETER, TUNNELED Left 1/28/2023    Procedure: Insertion,catheter,tunneled;  Surgeon: Watson Fontenot MD;  Location: [facility] OR;  Service: General;  Laterality: Left;    LAPAROTOMY, EXPLORATORY N/A 1/14/2023    Procedure: LAPAROTOMY, EXPLORATORY;  Surgeon: Manuel Javier MD;  Location: [facility] OR;  Service: General;  Laterality: N/A;    LAPAROTOMY, EXPLORATORY N/A 1/16/2023    Procedure: LAPAROTOMY, EXPLORATORY;  Surgeon: Manuel Javier MD;  Location:  [facility] OR;  Service: General;  Laterality: N/A;    LEFT HEART CATHETERIZATION Left 2/21/2024    Procedure: Left heart cath;  Surgeon: Todd Carlisle MD;  Location: [facility] CATH LAB;  Service: Cardiology;  Laterality: Left;    LYMPHADENECTOMY Right 1/2/2024    Procedure: LYMPHADENECTOMY;  Surgeon: Tan Thomson MD;  [facility];  Service: Cardiothoracic;  Laterality: Right;    PACEMAKER, TEMPORARY, TRANSVENOUS, PEDIATRIC Right 1/12/2024    Procedure: Pacemaker, Temporary, Transvenous;  Surgeon: Todd Carlisle MD;  Location: [facility] CATH LAB;  Service: Cardiology;  Laterality: Right;    REMOVAL OF CATHETER Right 1/28/2023    Procedure: REMOVAL, CATHETER;  Surgeon: Watson Fontenot MD;  Location: [facility] OR;  Service: General;  Laterality: Right;    REMOVAL, TUNNELED CATH Right 1/25/2023    Procedure: REMOVAL, TUNNELED CATH;  Surgeon: Manuel Javier MD;  Location: [facility] OR;  Service: General;  Laterality: Right;    ROBOTIC BRONCHOSCOPY N/A 10/20/2023    Procedure: ROBOTIC BRONCHOSCOPY;  Surgeon: Mayda Monzon MD;  [facility];  Service: Pulmonary;  Laterality: N/A;    SHOULDER SURGERY  2009 & 2010    right rotator cuff    THORACOTOMY Right 1/2/2024    Procedure: THORACOTOMY;  Surgeon: Tan Thomson MD;  [address];  Service: Cardiothoracic;  Laterality: Right;    THORACOTOMY, WITH INITIAL THERAPEUTIC WEDGE RESECTION OF LUNG Right 1/2/2024    Procedure: THORACOTOMY, WITH INITIAL THERAPEUTIC WEDGE RESECTION OF LUNG;  Surgeon: Tan Thomson MD;  [address];  Service: Cardiothoracic;  Laterality: Right;    TONSILLECTOMY      UPPER GASTROINTESTINAL ENDOSCOPY  04/2018       Family History   Problem Relation Name Age of Onset    Vision loss Father Shin Cervantes Jr.     Arthritis Father Shin Cervantes Jr.     Breast cancer Mother Megan Bordenman     Cancer Mother Megan Cervantes     Vision loss Mother Megan Bordenman     Hyperlipidemia Sister Sweetie  Maria A     Breast cancer Paternal Aunt Ibis Durbin     Cancer Paternal Aunt Ibis Durbin     Diabetes Paternal Grandfather Shin Cervantes, .     Vision loss Sister Noreen Saha     Kidney disease Neg Hx         Social History     Socioeconomic History    Marital status:    Tobacco Use    Smoking status: Never    Smokeless tobacco: Never   Substance and Sexual Activity    Alcohol use: No    Drug use: Never    Sexual activity: Not Currently     Partners: Male     Birth control/protection: See Surgical Hx     Comment: :      Social Determinants of Health     Financial Resource Strain: Low Risk  (3/28/2024)    Overall Financial Resource Strain (CARDIA)     Difficulty of Paying Living Expenses: Not hard at all   Food Insecurity: No Food Insecurity (3/28/2024)    Hunger Vital Sign     Worried About Running Out of Food in the Last Year: Never true     Ran Out of Food in the Last Year: Never true   Transportation Needs: No Transportation Needs (3/28/2024)    PRAPARE - Transportation     Lack of Transportation (Medical): No     Lack of Transportation (Non-Medical): No   Physical Activity: Inactive (2/14/2024)    Exercise Vital Sign     Days of Exercise per Week: 0 days     Minutes of Exercise per Session: 0 min   Stress: Stress Concern Present (3/28/2024)    [employer] - Occupational Stress Questionnaire     Feeling of Stress : To some extent   Housing Stability: Low Risk  (3/28/2024)    Housing Stability Vital Sign     Unable to Pay for Housing in the Last Year: No     Number of Places Lived in the Last Year: 1     Unstable Housing in the Last Year: No       Current Outpatient Medications   Medication Sig Dispense Refill    albuterol-ipratropium (DUO-NEB) 2.5 mg-0.5 mg/3 mL nebulizer solution Take 3 mLs by nebulization every 6 (six) hours as needed for Wheezing. Rescue 240 mL 11    amiodarone (PACERONE) 200 MG Tab Take 1 tablet (200 mg total) by mouth once daily. 180  "tablet 3    aspirin (ECOTRIN) 81 MG EC tablet Take 1 tablet (81 mg total) by mouth once daily. 90 tablet 3    atorvastatin (LIPITOR) 40 MG tablet Take 1 tablet (40 mg total) by mouth every evening. 90 tablet 3    betahistine HCl (BETAHISTINE, BULK, MISC)       blood sugar diagnostic (TRUE METRIX GLUCOSE TEST STRIP) Strp USE ONE STRIP FOR TESTING 2 TIMES A  each 3    carvediloL (COREG) 6.25 MG tablet Take 1 tablet (6.25 mg total) by mouth 2 (two) times daily with meals. 180 tablet 3    dicyclomine (BENTYL) 10 MG capsule Take 10 mg by mouth 3 (three) times daily.      insulin aspart U-100 (NOVOLOG) 100 unit/mL (3 mL) InPn pen Inject before meals:   201-250=+2, 251-300=+3; 301-350=+4, over 350=+5 units 15 mL 0    lancets Misc 1 lancet by Misc.(Non-Drug; Combo Route) route 4 (four) times daily. 400 each 3    levothyroxine (SYNTHROID) 112 MCG tablet Take 1 tablet (112 mcg total) by mouth before breakfast. 90 tablet 3    linaGLIPtin (TRADJENTA) 5 mg Tab tablet Take 1 tablet (5 mg total) by mouth once daily. 90 tablet 1    loperamide (IMODIUM) 2 mg capsule Take 2 mg by mouth once as needed for Diarrhea.      meclizine (ANTIVERT) 25 mg tablet Take 1 tablet (25 mg total) by mouth 3 (three) times daily as needed for Dizziness. 90 tablet 3    midodrine (PROAMATINE) 10 MG tablet 10 mg 2 (two) times a day.      ondansetron (ZOFRAN) 4 MG tablet Take 4 mg by mouth every 4 (four) hours as needed for Nausea.      pen needle, diabetic (BD ULTRA-FINE MARIS PEN NEEDLE) 32 gauge x 5/32" Ndle 1 Device by Misc.(Non-Drug; Combo Route) route 2 (two) times a day. 200 each 3    pregabalin (LYRICA) 75 MG capsule Take 1 capsule (75 mg total) by mouth every other day. Give after HD 45 capsule 2    MELLISSA-BENJY RX 1- mg-mg-mcg Tab Take 1 tablet by mouth.      torsemide (DEMADEX) 20 MG Tab Take 1 tablet (20 mg total) by mouth 2 (two) times daily. 180 tablet 3    valsartan (DIOVAN) 40 MG tablet Take 0.5 tablets (20 mg total) by mouth 2 " (two) times daily. 30 tablet 11    venlafaxine (EFFEXOR) 37.5 MG Tab Take 1 tablet (37.5 mg total) by mouth once daily. 90 tablet 3    vit C,P-Ah-nlfwc-lutein-zeaxan (PRESERVISION AREDS 2) [id number] mg-unit-mg-mg Cap       azelastine (ASTELIN) 137 mcg (0.1 %) nasal spray 2 sprays (274 mcg total) by Nasal route 2 (two) times daily. 30 mL 6    blood-glucose meter kit Use to test twice a day 1 each 0    calcium-vitamin D3 (OS-CIPRIANO 500 + D3) 500 mg-5 mcg (200 unit) per tablet Take 1 tablet by mouth once daily. 30 tablet 3    ferrous gluconate (FERGON) 324 MG tablet Take 1 tablet (324 mg total) by mouth daily with breakfast. (Patient not taking: Reported on 5/24/2024) 90 tablet 3    pantoprazole (PROTONIX) 40 MG tablet Take 1 tablet (40 mg total) by mouth 2 (two) times daily before meals. 180 tablet 3     No current facility-administered medications for this visit.       Review of patient's allergies indicates:   Allergen Reactions    Crestor [rosuvastatin] Swelling    Gluten protein          Review of Systems   Constitutional: Negative for chills and fever.   Cardiovascular:  Negative for claudication and leg swelling.   Skin:  Positive for color change and nail changes. Negative for suspicious lesions.   Musculoskeletal:  Positive for joint pain. Negative for joint swelling, muscle cramps and muscle weakness.   Gastrointestinal:  Negative for nausea and vomiting.   Neurological:  Positive for numbness and paresthesias.   Psychiatric/Behavioral:  Negative for altered mental status.            Objective:      Physical Exam  Constitutional:       General: She is not in acute distress.     Appearance: She is well-developed. She is not diaphoretic.   Cardiovascular:      Pulses:           Dorsalis pedis pulses are 2+ on the right side and 2+ on the left side.        Posterior tibial pulses are 1+ on the right side and 1+ on the left side.      Comments: CFT is < 3 seconds bilateral.  Pedal hair growth is decreased  "bilateral.  Mild non pitting lower extremity edema noted bilateral.  Toes are cool to touch bilateral.  Musculoskeletal:         General: Deformity present. No tenderness.      Comments: Muscle strength 5/5 in all muscle groups bilateral.   (-) for pain with palpation of bilateral foot and ankle. Semi-reducible contracture of toes 2-5 bilateral with adductovarus rotation of the 5th.   Skin:     General: Skin is warm and dry.      Capillary Refill: Capillary refill takes less than 2 seconds.      Coloration: Skin is not pale.      Findings: Lesion present. No abrasion, bruising, burn, ecchymosis, erythema, petechiae or rash.      Comments: Pedal skin appears thin bilateral.  Toenails x 10 appear thickened by 2 mm, elongated by 4 mm, and discolored with subungual debris.  Hyperkeratotic lesion noted to bilateral sub 5th met head.   Neurological:      Mental Status: She is alert and oriented to person, place, and time.      Sensory: Sensory deficit present.      Motor: No weakness or atrophy.      Comments: Protective sensation per [place] monofilament is decreased bilateral.  Vibratory sensation is decreased bilateral.  Light touch is intact bilateral.               Assessment:       Encounter Diagnoses   Name Primary?    Diabetic polyneuropathy associated with type 2 diabetes mellitus Yes    Hammer toes of both feet     Onychomycosis due to dermatophyte     Corn or callus            Plan:       Lily Montesinos" was seen today for diabetes mellitus.    Diagnoses and all orders for this visit:    Diabetic polyneuropathy associated with type 2 diabetes mellitus    Hammer toes of both feet    Onychomycosis due to dermatophyte    Corn or callus        I counseled the patient on her conditions, their implications and medical management.      To continue wearing shoe gear that minimizes pressure to the digital deformities.    Shoe inspection. Diabetic Foot Education. Patient reminded of the importance of good " nutrition and blood sugar control to help prevent podiatric complications of diabetes. Patient instructed on proper foot hygeine. We discussed wearing proper shoe gear, daily foot inspections, never walking without protective shoe gear, never putting sharp instruments to feet    With patient's permission, nails were aggressively reduced and debrided x 10 to their soft tissue attachment mechanically and with electric , removing all offending nail and debris.  Also, a sterile # 15 scalpel was used to trim lesions x 2 down to smooth appearing skin without incident.  Patient relates relief following the procedure. She will continue to monitor the areas daily, inspect her feet, wear protective shoe gear when ambulatory, moisturizer to maintain skin integrity and follow in this office in approximately 3 months, sooner p.r.n.    Follow up in about 3 months (around 9/28/2024).    Lucas Bryan DPM

## 2024-07-18 ENCOUNTER — PATIENT MESSAGE (OUTPATIENT)
Dept: ELECTROPHYSIOLOGY | Facility: CLINIC | Age: 74
End: 2024-07-18
Payer: MEDICARE

## 2024-07-25 LAB
OHS CV DC REMOTE DEVICE TYPE: NORMAL
OHS CV RV PACING PERCENT: 25.24 %

## 2024-08-12 ENCOUNTER — LAB VISIT (OUTPATIENT)
Dept: LAB | Facility: HOSPITAL | Age: 74
End: 2024-08-12
Attending: INTERNAL MEDICINE
Payer: MEDICARE

## 2024-08-12 ENCOUNTER — OFFICE VISIT (OUTPATIENT)
Dept: TRANSPLANT | Facility: CLINIC | Age: 74
End: 2024-08-12
Payer: MEDICARE

## 2024-08-12 VITALS
DIASTOLIC BLOOD PRESSURE: 54 MMHG | WEIGHT: 114.19 LBS | HEART RATE: 66 BPM | BODY MASS INDEX: 19.03 KG/M2 | SYSTOLIC BLOOD PRESSURE: 104 MMHG | HEIGHT: 65 IN

## 2024-08-12 DIAGNOSIS — I50.9 ACUTE DECOMPENSATED HEART FAILURE: Primary | ICD-10-CM

## 2024-08-12 DIAGNOSIS — I49.5 TACHYCARDIA-BRADYCARDIA SYNDROME: ICD-10-CM

## 2024-08-12 DIAGNOSIS — N18.6 ESRD (END STAGE RENAL DISEASE) ON DIALYSIS: ICD-10-CM

## 2024-08-12 DIAGNOSIS — E78.2 MIXED HYPERLIPIDEMIA: ICD-10-CM

## 2024-08-12 DIAGNOSIS — Z99.2 ESRD (END STAGE RENAL DISEASE) ON DIALYSIS: ICD-10-CM

## 2024-08-12 DIAGNOSIS — I50.20 HFREF (HEART FAILURE WITH REDUCED EJECTION FRACTION): ICD-10-CM

## 2024-08-12 DIAGNOSIS — I42.8 NONISCHEMIC CARDIOMYOPATHY: ICD-10-CM

## 2024-08-12 DIAGNOSIS — I50.9 ACUTE DECOMPENSATED HEART FAILURE: ICD-10-CM

## 2024-08-12 DIAGNOSIS — I48.0 PAROXYSMAL ATRIAL FIBRILLATION: Chronic | ICD-10-CM

## 2024-08-12 DIAGNOSIS — I50.42 CHRONIC COMBINED SYSTOLIC AND DIASTOLIC HEART FAILURE: ICD-10-CM

## 2024-08-12 LAB
ALBUMIN SERPL BCP-MCNC: 2.9 G/DL (ref 3.5–5.2)
ALP SERPL-CCNC: 163 U/L (ref 55–135)
ALT SERPL W/O P-5'-P-CCNC: 30 U/L (ref 10–44)
ANION GAP SERPL CALC-SCNC: 12 MMOL/L (ref 8–16)
AST SERPL-CCNC: 24 U/L (ref 10–40)
BASOPHILS # BLD AUTO: 0.04 K/UL (ref 0–0.2)
BASOPHILS NFR BLD: 0.4 % (ref 0–1.9)
BILIRUB SERPL-MCNC: 0.3 MG/DL (ref 0.1–1)
BNP SERPL-MCNC: 226 PG/ML (ref 0–99)
BUN SERPL-MCNC: 32 MG/DL (ref 8–23)
CALCIUM SERPL-MCNC: 10.7 MG/DL (ref 8.7–10.5)
CHLORIDE SERPL-SCNC: 94 MMOL/L (ref 95–110)
CO2 SERPL-SCNC: 27 MMOL/L (ref 23–29)
CREAT SERPL-MCNC: 5.7 MG/DL (ref 0.5–1.4)
DIFFERENTIAL METHOD BLD: ABNORMAL
EOSINOPHIL # BLD AUTO: 0.1 K/UL (ref 0–0.5)
EOSINOPHIL NFR BLD: 1.5 % (ref 0–8)
ERYTHROCYTE [DISTWIDTH] IN BLOOD BY AUTOMATED COUNT: 18 % (ref 11.5–14.5)
EST. GFR  (NO RACE VARIABLE): 7.3 ML/MIN/1.73 M^2
GLUCOSE SERPL-MCNC: 196 MG/DL (ref 70–110)
HCT VFR BLD AUTO: 41.5 % (ref 37–48.5)
HGB BLD-MCNC: 12.3 G/DL (ref 12–16)
IMM GRANULOCYTES # BLD AUTO: 0.15 K/UL (ref 0–0.04)
IMM GRANULOCYTES NFR BLD AUTO: 1.6 % (ref 0–0.5)
LYMPHOCYTES # BLD AUTO: 0.8 K/UL (ref 1–4.8)
LYMPHOCYTES NFR BLD: 9.1 % (ref 18–48)
MCH RBC QN AUTO: 29 PG (ref 27–31)
MCHC RBC AUTO-ENTMCNC: 29.6 G/DL (ref 32–36)
MCV RBC AUTO: 98 FL (ref 82–98)
MONOCYTES # BLD AUTO: 1.4 K/UL (ref 0.3–1)
MONOCYTES NFR BLD: 14.7 % (ref 4–15)
NEUTROPHILS # BLD AUTO: 6.7 K/UL (ref 1.8–7.7)
NEUTROPHILS NFR BLD: 72.7 % (ref 38–73)
NRBC BLD-RTO: 0 /100 WBC
PLATELET # BLD AUTO: 297 K/UL (ref 150–450)
PMV BLD AUTO: 10.3 FL (ref 9.2–12.9)
POTASSIUM SERPL-SCNC: 3.5 MMOL/L (ref 3.5–5.1)
PROT SERPL-MCNC: 8.2 G/DL (ref 6–8.4)
RBC # BLD AUTO: 4.24 M/UL (ref 4–5.4)
SODIUM SERPL-SCNC: 133 MMOL/L (ref 136–145)
WBC # BLD AUTO: 9.23 K/UL (ref 3.9–12.7)

## 2024-08-12 PROCEDURE — 99999 PR PBB SHADOW E&M-EST. PATIENT-LVL IV: CPT | Mod: PBBFAC,,, | Performed by: INTERNAL MEDICINE

## 2024-08-12 PROCEDURE — 80053 COMPREHEN METABOLIC PANEL: CPT | Performed by: INTERNAL MEDICINE

## 2024-08-12 PROCEDURE — 3078F DIAST BP <80 MM HG: CPT | Mod: CPTII,S$GLB,, | Performed by: INTERNAL MEDICINE

## 2024-08-12 PROCEDURE — 1157F ADVNC CARE PLAN IN RCRD: CPT | Mod: CPTII,S$GLB,, | Performed by: INTERNAL MEDICINE

## 2024-08-12 PROCEDURE — 1101F PT FALLS ASSESS-DOCD LE1/YR: CPT | Mod: CPTII,S$GLB,, | Performed by: INTERNAL MEDICINE

## 2024-08-12 PROCEDURE — 3288F FALL RISK ASSESSMENT DOCD: CPT | Mod: CPTII,S$GLB,, | Performed by: INTERNAL MEDICINE

## 2024-08-12 PROCEDURE — 1126F AMNT PAIN NOTED NONE PRSNT: CPT | Mod: CPTII,S$GLB,, | Performed by: INTERNAL MEDICINE

## 2024-08-12 PROCEDURE — 3074F SYST BP LT 130 MM HG: CPT | Mod: CPTII,S$GLB,, | Performed by: INTERNAL MEDICINE

## 2024-08-12 PROCEDURE — 83880 ASSAY OF NATRIURETIC PEPTIDE: CPT | Performed by: INTERNAL MEDICINE

## 2024-08-12 PROCEDURE — 1159F MED LIST DOCD IN RCRD: CPT | Mod: CPTII,S$GLB,, | Performed by: INTERNAL MEDICINE

## 2024-08-12 PROCEDURE — 99214 OFFICE O/P EST MOD 30 MIN: CPT | Mod: S$GLB,,, | Performed by: INTERNAL MEDICINE

## 2024-08-12 PROCEDURE — 36415 COLL VENOUS BLD VENIPUNCTURE: CPT | Performed by: INTERNAL MEDICINE

## 2024-08-12 PROCEDURE — 85025 COMPLETE CBC W/AUTO DIFF WBC: CPT | Performed by: INTERNAL MEDICINE

## 2024-08-12 PROCEDURE — 3066F NEPHROPATHY DOC TX: CPT | Mod: CPTII,S$GLB,, | Performed by: INTERNAL MEDICINE

## 2024-08-12 PROCEDURE — 3008F BODY MASS INDEX DOCD: CPT | Mod: CPTII,S$GLB,, | Performed by: INTERNAL MEDICINE

## 2024-08-12 PROCEDURE — 4010F ACE/ARB THERAPY RXD/TAKEN: CPT | Mod: CPTII,S$GLB,, | Performed by: INTERNAL MEDICINE

## 2024-08-12 RX ORDER — CARVEDILOL 6.25 MG/1
6.25 TABLET ORAL 2 TIMES DAILY WITH MEALS
Qty: 180 TABLET | Refills: 3 | Status: SHIPPED | OUTPATIENT
Start: 2024-08-12 | End: 2025-08-12

## 2024-08-12 RX ORDER — VALSARTAN 40 MG/1
20 TABLET ORAL 2 TIMES DAILY
Qty: 30 TABLET | Refills: 11 | Status: SHIPPED | OUTPATIENT
Start: 2024-08-12 | End: 2025-08-12

## 2024-08-12 RX ORDER — TORSEMIDE 20 MG/1
20 TABLET ORAL 2 TIMES DAILY
Qty: 180 TABLET | Refills: 3 | Status: SHIPPED | OUTPATIENT
Start: 2024-08-12

## 2024-08-12 NOTE — PATIENT INSTRUCTIONS
You have just the right amount of fluid on you.  Please adhere to a low sodium diet (no more than 1.5 grams of sodium in 24h).  3.   Follow fluid restriction of  1. no more than 2 liters in 24 hours..   4. No changes on medications.  5. F/u in 6 months with labs.

## 2024-08-12 NOTE — PROGRESS NOTES
Advanced Heart Failure and Transplantation Clinic Follow up.      Attending Physician: Deion Thibodeaux MD.  The patient's last visit with me was on 5/13/2024.         HPI.  Lily Green is a 73 year old white woman with celiac disease, diabetes mellitus type 2 with polyneuropathy, hypertension, hyperlipidemia, history of peptic ulcer disease, depression, anxiety, history of hysterectomy in 1987, history of peritonitis due to diffuse colonic ischemia status post total colectomy and end ileostomy creation on 1/14/2023, evacuation of intraabdominal hematoma, takedown of left lateral ileostomy and recreation of right lateral end ileostomy on 1/16/2023, elective robotic ileostomy reversal and ileocolonic anastomosis on 8/7/2023, irritable bowel syndrome, end stage renal disease status post right internal jugular permanent dialysis catheter on 1/25/2023 removed on 1/28/2023 and replaced with left internal jugular permanent dialysis catheter (on hemodialysis Tuesday Thursday Saturday), right middle lobe carcinoid tumor status post robotic-assisted converted to right thoracotomy with therapeutic wedge resection and mediastinal lymph node dissection and repair of bleeding right inferior pulmonary vein on 1/2/2024, atrial fibrillation, [employer]tronic Micra AV leadless cardiac pacemaker placement on 1/16/2024. She lives in [place], [place]. She is . Her primary care physician is Dr. Pavel Calixto.               She was hospitalized at [facility] from 1/2/2024 to 2/6/2024 for right middle lobe carcinoid tumor, parapneumonic effusion, atrial fibrillation with rapid ventricular response, tachy-eulogio syndrome, gastrointestinal bleeding while on heparin. She was discharged to [facility] skilled nursing facility. She was prescribed cefpodoxime and metronidazole for 13 days, fluconazole for 7  days, metoprolol tartrate, and venlafaxine.               She presented to [facility] Emergency Department on 2/12/2024 from the skilled nursing facility with increased work of breathing, dyspnea, and hypoxia with oxygen saturation of 70% on 2 liters/minute of supplemental oxygen. It occurred acutely on the day of presentation. Emergency medical services put her on CPAP with some improvement. She was put on BiPAP in the emergency department. She had tachycardia and tachypnea. Her last dialysis session was 2 days prior on Saturday. She reported cough and nausea. Labs showed BNP 2754 pg/mL, troponin 0.312 ng/mL. Venous blood gas showed pH 7.438, pCO2 38.9. EKG showed sinus tachycardia. Chest X-ray showed pleural effusions and possible multifocal pneumonia. Nephrology was consulted for emergent dialysis with goal of removing 3 liters. She was admitted to Critical Care Medicine.     Echocardiogram revealed new low left ventricular ejection fraction. Troponins remained elevated. Cardiology was consulted. She developed atrial fibrillation with rapid ventricular response on 2/13/2024. She was started on amiodarone infusion. She underwent slow low-efficiency dialysis (SLED). CT showed fluid collections in the right paraesophageal space and azygoesophageal recess. Cardiothoracic Surgery was consulted and recommended Interventional Radiology consult for drainage, but there was no safe window for approach. Interventional Cardiology deferred angiography due to respiratory failure. Palliative Care was consulted. Dialysis removed further fluid. Metoprolol was increased. Thoracentesis was performed on 2/15/2024 removing 1.45 liters, which was sent for analysis. She developed re-expansion pulmonary edema and was started on Airvo high flow oxygen. Airvo was weaned down to 50 liters/minute and FiO2 40% on 2/16/2024. Pleural effusion was transudative. She was started on vasopressor support. She was weaned to  high flow nasal cannula at 12 liters/minute on 2/17/2024. She tolerated SLED without norepinephrine. She was weaned to 4 liters/minute on 2/18/2024. She was transferred to Hospital Medicine Team W on 2/20/2024. She was started on nifedipine. She worked with Physical and Occupational Therapy. Interventional Cardiology was reconsulted. Left heart catheterization showed luminal irregularities. Interventional Cardiology recommended starting aspirin 81 mg daily and atorvastatin 10 mg daily and consulting Heart Transplant Service (HTS) for management of nonischemic cardiomyopathy. She was already on atorvastatin 40 mg daily. Palliative Care signed off and planned to see her outpatient. She continued to require 4 liters/minute of supplemental oxygen. She was still short of breath. HTS was consulted and they said General Cardiology could be consulted. Cardiology recommended stopping nifedipine, starting valsartan, and resuming her metoprolol. Nasal cannula was weaned to 2 liters/minute on 2/25/2024 but she started coughing. Repeat chest X-ray on 2/26/2024 paradoxically showed slight increase in left pleural effusion, as well as persistent mild pulmonary vascular congestion. Interventional Radiology was consulted for thoracentesis. They removed 725 mL of pleural fluid on 2/27/2024. Fluid was sent for cultures. She continued to feel short of breath with tachypnea on 2/28/2024. Net fluid status since admission was negative 13.8 liters at this point. Hypoxia resolved but she was coughing more. Chest CTA showed a new left lower lobe pneumonia. She was put on a course of doxycycline. Pharmacy recommended adding cefpodoxime. She was discharged to the skilled nursing facility on 3/1/2024.      March 26, 2024: patient has dyspnea on exertion, dyspnea with minimal activities. She came in a wheelchair with her . She was on rehab and said she was able to walk 150 steps.  She prefers sleeping elevated. She complained of leg  edema. She is on HD M-W-.        May 13, 2024:  patient complains of dyspnea on exertion with any activities, leg edema, cough that worsens on recumbence, abdominal distention      2024: feels well. No ER visits or hospitalizations since last appointment. Her nephrologist worked on her since last appointment. Leg edema and congestion are gone.     Review of Systems   Constitutional:  Negative for activity change, appetite change, chills, diaphoresis and fever.   HENT:  Negative for nasal congestion, rhinorrhea and sore throat.    Eyes:  Negative for visual disturbance.   Respiratory:  Negative for cough, choking and shortness of breath.    Cardiovascular:  Negative for chest pain.   Gastrointestinal:  Negative for abdominal pain, diarrhea, nausea and vomiting.   Genitourinary:  Negative for difficulty urinating, dysuria and hematuria.   Integumentary:  Negative for rash.   Neurological:  Negative for seizures, syncope and light-headedness.   Psychiatric/Behavioral:  Negative for agitation and hallucinations.         Past Medical History:   Diagnosis Date    Anxiety     Celiac disease 2018    Celiac disease     Depression     Diabetes mellitus     Family history of breast cancer in mother      at age 68    Hyperlipidemia     Hypertension     Meniere disease     Meniere's disease, unspecified ear     Menopause     On supplemental oxygen by nasal cannula 2023    Hypoxic respiratory failure resolved.  No oxygen desaturation on walk.        Peptic ulcer     Peritonitis 2023    Pleural effusion on left 01/15/2024    C/w transudate in setting of acute decompensated heart failure due to a-fib and volume overload.  Now appears euvolemic in clinic today, resolved.       Reflux esophagitis     Urinary tract infection     Vaginal infection     Vaginal Pap smear 2014    Pap/Hpv Negative         Past Surgical History:   Procedure Laterality Date    APPENDECTOMY      BREAST SURGERY       Tags    CARPAL TUNNEL RELEASE Bilateral 09/2017    ,     CLOSURE, COLOSTOMY Left 1/16/2023    Procedure: CLOSURE, COLOSTOMY;  Surgeon: Manuel Javier MD;  Location: [facility] OR;  Service: General;  Laterality: Left;    COLONOSCOPY  04/2018    Normal  ([place])     COLOSTOMY Right 1/16/2023    Procedure: CREATION, COLOSTOMY;  Surgeon: Manuel Javier MD;  Location: [facility] OR;  Service: General;  Laterality: Right;    CORONARY ANGIOGRAPHY N/A 2/21/2024    Procedure: ANGIOGRAM, CORONARY ARTERY;  Surgeon: Todd Carlisle MD;  Location: [facility] CATH LAB;  Service: Cardiology;  Laterality: N/A;    DILATION AND CURETTAGE OF UTERUS  1986    DUPUYTREN CONTRACTURE RELEASE Bilateral 09/2017    ESOPHAGOGASTRODUODENOSCOPY N/A 1/24/2024    Procedure: EGD (ESOPHAGOGASTRODUODENOSCOPY);  Surgeon: Yamilet Walters MD;  Location: [facility] ENDO (2ND FLR);  Service: Gastroenterology;  Laterality: N/A;    ESOPHAGOGASTRODUODENOSCOPY N/A 1/26/2024    Procedure: EGD (ESOPHAGOGASTRODUODENOSCOPY);  Surgeon: Yamilet Walters MD;  Location: [facility] ENDO (2ND FLR);  Service: Gastroenterology;  Laterality: N/A;    HYSTERECTOMY  1987    BEAU w/ appy, no BSO     IMPLANTATION OF LEADLESS PACEMAKER N/A 1/16/2024    Procedure: INSERTION, CARDIAC PACEMAKER, LEADLESS;  Surgeon: LENIN Frances MD;  Location: [facility] EP LAB;  Service: Cardiology;  Laterality: N/A;  SB, LEADLESS PPM (MICRA), MDT, ANES, EH, CVICU [id number]    INJECTION OF NEUROLYTIC AGENT AROUND LUMBAR SYMPATHETIC NERVE N/A 1/6/2022    Procedure: BLOCK, LUMBAR SYMPATHETIC;  Surgeon: Solueymane Rizo Jr., MD;  Location: [facility] PAIN MGT;  Service: Pain Management;  Laterality: N/A;  no pacemaker   pt is diabetic     INJECTION OF NEUROLYTIC AGENT AROUND LUMBAR SYMPATHETIC NERVE N/A 8/25/2022    Procedure: BLOCK, LUMBAR SYMPATHETIC;  Surgeon: Souleymane Rizo Jr., MD;  Location: [facility] PAIN MGT;  Service: Pain Management;  Laterality: N/A;  diabetic     INSERTION OF TUNNELED CENTRAL VENOUS  HEMODIALYSIS CATHETER Right 1/25/2023    Procedure: INSERTION, CATHETER, HEMODIALYSIS, DUAL LUMEN;  Surgeon: Manuel Javier MD;  Location: [facility] OR;  Service: General;  Laterality: Right;    INSERTION, CATHETER, TUNNELED Left 1/28/2023    Procedure: Insertion,catheter,tunneled;  Surgeon: Watson Fontenot MD;  Location: [facility] OR;  Service: General;  Laterality: Left;    LAPAROTOMY, EXPLORATORY N/A 1/14/2023    Procedure: LAPAROTOMY, EXPLORATORY;  Surgeon: Manuel Javier MD;  Location: [facility] OR;  Service: General;  Laterality: N/A;    LAPAROTOMY, EXPLORATORY N/A 1/16/2023    Procedure: LAPAROTOMY, EXPLORATORY;  Surgeon: Manuel Javier MD;  Location: [facility] OR;  Service: General;  Laterality: N/A;    LEFT HEART CATHETERIZATION Left 2/21/2024    Procedure: Left heart cath;  Surgeon: Todd Carlisle MD;  Location: [facility] CATH LAB;  Service: Cardiology;  Laterality: Left;    LYMPHADENECTOMY Right 1/2/2024    Procedure: LYMPHADENECTOMY;  Surgeon: Tan Thomson MD;  Location: [facility] OR 2ND FLR;  Service: Cardiothoracic;  Laterality: Right;    PACEMAKER, TEMPORARY, TRANSVENOUS, PEDIATRIC Right 1/12/2024    Procedure: Pacemaker, Temporary, Transvenous;  Surgeon: Todd Carlisle MD;  Location: [facility] CATH LAB;  Service: Cardiology;  Laterality: Right;    REMOVAL OF CATHETER Right 1/28/2023    Procedure: REMOVAL, CATHETER;  Surgeon: Watson Fontenot MD;  Location: [facility] OR;  Service: General;  Laterality: Right;    REMOVAL, TUNNELED CATH Right 1/25/2023    Procedure: REMOVAL, TUNNELED CATH;  Surgeon: Manuel Javier MD;  Location: [facility] OR;  Service: General;  Laterality: Right;    ROBOTIC BRONCHOSCOPY N/A 10/20/2023    Procedure: ROBOTIC BRONCHOSCOPY;  Surgeon: Mayda Monzon MD;  Location: [facility] OR 2ND FLR;  Service: Pulmonary;  Laterality: N/A;    SHOULDER SURGERY  2009 & 2010    right rotator cuff    THORACOTOMY Right 1/2/2024    Procedure: THORACOTOMY;  Surgeon: Tan Thomson MD;  Location: [facility] OR  2ND FLR;  Service: Cardiothoracic;  Laterality: Right;    THORACOTOMY, WITH INITIAL THERAPEUTIC WEDGE RESECTION OF LUNG Right 1/2/2024    Procedure: THORACOTOMY, WITH INITIAL THERAPEUTIC WEDGE RESECTION OF LUNG;  Surgeon: Tan Thomson MD;  Location: [facility] OR 2ND FLR;  Service: Cardiothoracic;  Laterality: Right;    TONSILLECTOMY      UPPER GASTROINTESTINAL ENDOSCOPY  04/2018        Family History   Problem Relation Name Age of Onset    Vision loss Father Shin Cervantes Jr.     Arthritis Father Shin Cervantes Jr.     Breast cancer Mother Megan Cervantes     Cancer Mother Megan Cervantes     Vision loss Mother Megan Cervantes     Hyperlipidemia Sister Sweetie Saha     Breast cancer Paternal Aunt Ibis Redditt     Cancer Paternal Aunt Ibis Reddisalomón     Diabetes Paternal Grandfather Shin Cervantes, .     Vision loss Sister Noreen Saha     Kidney disease Neg Hx          Review of patient's allergies indicates:   Allergen Reactions    Crestor [rosuvastatin] Swelling    Gluten protein         Current Outpatient Medications   Medication Instructions    albuterol-ipratropium (DUO-NEB) 2.5 mg-0.5 mg/3 mL nebulizer solution 3 mLs, Nebulization, Every 6 hours PRN, Rescue    amiodarone (PACERONE) 200 mg, Oral, Daily    aspirin (ECOTRIN) 81 mg, Oral, Daily    atorvastatin (LIPITOR) 40 mg, Oral, Nightly    azelastine (ASTELIN) 274 mcg, Nasal, 2 times daily    blood sugar diagnostic (TRUE METRIX GLUCOSE TEST STRIP) Strp USE ONE STRIP FOR TESTING 2 TIMES A DAY    blood-glucose meter kit Use to test twice a day    carvediloL (COREG) 6.25 mg, Oral, 2 times daily with meals    dicyclomine (BENTYL) 10 mg, Oral, 3 times daily    insulin aspart U-100 (NOVOLOG) 100 unit/mL (3 mL) InPn pen Inject before meals:   201-250=+2, 251-300=+3; 301-350=+4, over 350=+5 units    lancets Misc 1 lancet , Misc.(Non-Drug; Combo Route), 4 times daily    levothyroxine (SYNTHROID) 112 mcg, Oral, Before breakfast    loperamide (IMODIUM) 2 mg, Oral, Once  "as needed    meclizine (ANTIVERT) 25 mg, Oral, 3 times daily PRN    midodrine (PROAMATINE) 10 mg, 2 times daily    ondansetron (ZOFRAN) 4 mg, Oral, Every 4 hours PRN    pantoprazole (PROTONIX) 40 mg, Oral, 2 times daily before meals    pen needle, diabetic (BD ULTRA-FINE MARIS PEN NEEDLE) 32 gauge x 5/32" Ndle 1 Device, Misc.(Non-Drug; Combo Route), 2 times daily    pregabalin (LYRICA) 75 mg, Oral, Every other day, Give after HD    MELLISSA-BENJY RX 1- mg-mg-mcg Tab 1 tablet, Oral    torsemide (DEMADEX) 20 mg, Oral, 2 times daily    TRADJENTA 5 mg, Oral, Daily    valsartan (DIOVAN) 20 mg, Oral, 2 times daily        Vitals:    08/12/24 0935   BP: (!) 104/54   Pulse: 66        Wt Readings from Last 3 Encounters:   08/12/24 51.8 kg (114 lb 3.2 oz)   06/28/24 51 kg (112 lb 7 oz)   05/28/24 51 kg (112 lb 7 oz)     Temp Readings from Last 3 Encounters:   05/28/24 97.4 °F (36.3 °C) (Tympanic)   05/20/24 98.2 °F (36.8 °C)   05/11/24 98 °F (36.7 °C) (Oral)     BP Readings from Last 3 Encounters:   08/12/24 (!) 104/54   05/28/24 116/64   05/24/24 (!) 102/40     Pulse Readings from Last 3 Encounters:   08/12/24 66   05/28/24 62   05/24/24 66        Body mass index is 19 kg/m². Estimated body surface area is 1.54 meters squared as calculated from the following:    Height as of this encounter: 5' 5" (1.651 m).    Weight as of this encounter: 51.8 kg (114 lb 3.2 oz).     Physical Exam  Constitutional:       Appearance: She is well-developed.   HENT:      Head: Normocephalic and atraumatic.      Right Ear: External ear normal.      Left Ear: External ear normal.   Eyes:      Conjunctiva/sclera: Conjunctivae normal.      Pupils: Pupils are equal, round, and reactive to light.   Neck:      Vascular: No hepatojugular reflux or JVD.   Cardiovascular:      Rate and Rhythm: Normal rate and regular rhythm.      Pulses: Intact distal pulses.      Heart sounds: S1 normal and S2 normal. No murmur heard.     No friction rub. No gallop. "   Pulmonary:      Effort: Pulmonary effort is normal.      Breath sounds: Normal breath sounds.   Abdominal:      General: Bowel sounds are normal. There is no distension.      Palpations: Abdomen is soft.      Tenderness: There is no abdominal tenderness. There is no guarding or rebound.   Musculoskeletal:      Cervical back: Normal range of motion and neck supple.      Right lower leg: No edema.      Left lower leg: No edema.   Neurological:      Mental Status: She is alert and oriented to person, place, and time.          Lab Results   Component Value Date    BNP 1,554 (H) 03/26/2024     05/11/2024    K 3.2 (L) 05/11/2024    MG 2.0 03/29/2024    CL 97 05/11/2024    CO2 25 05/11/2024    BUN 16 05/11/2024    CREATININE 4.0 (H) 05/11/2024     (H) 05/11/2024    HGBA1C 4.9 11/15/2023    HGBA1C 6.8 10/23/2019    AST 47 (H) 05/11/2024    ALT 35 05/11/2024    ALBUMIN 2.2 (L) 05/11/2024    PROT 8.1 05/11/2024    BILITOT 0.6 05/11/2024    WBC 12.84 (H) 05/11/2024    HGB 9.1 (L) 05/11/2024    HCT 30.9 (L) 05/11/2024    HCT 40 03/26/2024     05/11/2024    INR 1.3 (H) 01/23/2024     (H) 02/15/2024    TSH 19.949 (H) 03/26/2024    CHOL 125 02/16/2024    HDL 39 (L) 02/16/2024    HDL 53 10/23/2019    LDLCALC 60.4 (L) 02/16/2024    TRIG 128 02/16/2024    TRIG 102 10/23/2019    FREET4 0.79 03/26/2024       Results for orders placed during the hospital encounter of 05/15/24    Echo    Interpretation Summary    Left Ventricle: The left ventricle is normal in size. Ventricular mass is normal. Severely increased wall thickness. There is concentric remodeling. Normal wall motion. There is normal systolic function with a visually estimated ejection fraction of 55 - 60%. Ejection fraction by visual approximation is 58%. There is indeterminate diastolic function.    Right Ventricle: Normal right ventricular cavity size. Wall thickness is normal. Systolic function is normal.    Left Atrium: Left atrium is severely  dilated.    Right Atrium: Right atrium is moderately dilated.    Aortic Valve: There is moderate aortic valve sclerosis. At most slightly restricted motion. Aortic valve peak velocity is 1.68 m/s. Mean gradient is 6 mmHg. The IVANNA is low normal to slightly reduced at 2.1. There is mild aortic regurgitation.    Mitral Valve: There is mild-moderate to moderate regurgitation.    Tricuspid Valve: There is mild regurgitation.    Pulmonary Artery: The estimated pulmonary artery systolic pressure is 36 mmHg.    IVC/SVC: Normal venous pressure at 3 mmHg.        Results for orders placed during the hospital encounter of 02/12/24    Cardiac catheterization    Conclusion    Non-obstructive CAD    Non-ischemic cardiomyopathy    The estimated blood loss was <50 mL.    The procedure log was documented by Documenter: Tori Corral and verified by Todd Carlisle MD.    Date: 2/21/2024  Time: 3:22 PM         Assessment and Plan:  Acute decompensated heart failure  -     CBC Auto Differential; Future; Expected date: 02/12/2025  -     Comprehensive Metabolic Panel; Future; Expected date: 02/12/2025  -     BNP; Future; Expected date: 02/12/2025    Tachycardia-bradycardia syndrome    Nonischemic cardiomyopathy    Mixed hyperlipidemia    HFrEF (heart failure with reduced ejection fraction)    Chronic combined systolic and diastolic heart failure    Paroxysmal atrial fibrillation    ESRD (end stage renal disease) on dialysis    Other orders  -     valsartan (DIOVAN) 40 MG tablet; Take 0.5 tablets (20 mg total) by mouth 2 (two) times daily.  Dispense: 30 tablet; Refill: 11  -     carvediloL (COREG) 6.25 MG tablet; Take 1 tablet (6.25 mg total) by mouth 2 (two) times daily with meals.  Dispense: 180 tablet; Refill: 3  -     torsemide (DEMADEX) 20 MG Tab; Take 1 tablet (20 mg total) by mouth 2 (two) times daily.  Dispense: 180 tablet; Refill: 3          Chronic systolic HF, NYHA class II, stage C. LVEF 55-60%, improved from 20-25%.     Etiology: NICM, suspected tachycardia induced. Has atrial fibrillation and previous HFrEF that had recovered normal function in 2023. Back down to 25% in the setting of a. Fib with RVR last February 2024.  Since then she had 30 day monitor that showed no a. Fib.  Devices: leadless pacemaker.  Medications: carvedilol 6.25 mg BID, valsartan 20 mg BID.  Hemodynamic status: warm, normotensive, euvolemic.  Clinical course:  low EF since 2023. Recovered EF after treatment of a. Fib. She also has been effectively de congested by her HD team.  Plan:  -no change on medications.  -f/u in 6 months with labs.       2. Atrial fibrillation and pacemaker. On amiodarone.  Follows with EP for treatment.     3. H/o lung cancer (carcinoid) s/p resection in 2024.     4. ESRD on HD.

## 2024-08-14 ENCOUNTER — OFFICE VISIT (OUTPATIENT)
Dept: PRIMARY CARE CLINIC | Facility: CLINIC | Age: 74
End: 2024-08-14
Payer: MEDICARE

## 2024-08-14 VITALS
OXYGEN SATURATION: 97 % | BODY MASS INDEX: 19.07 KG/M2 | HEIGHT: 65 IN | SYSTOLIC BLOOD PRESSURE: 98 MMHG | HEART RATE: 61 BPM | DIASTOLIC BLOOD PRESSURE: 56 MMHG | WEIGHT: 114.44 LBS

## 2024-08-14 DIAGNOSIS — K90.0 CELIAC DISEASE: Chronic | ICD-10-CM

## 2024-08-14 DIAGNOSIS — N18.6 ESRD (END STAGE RENAL DISEASE) ON DIALYSIS: ICD-10-CM

## 2024-08-14 DIAGNOSIS — H81.03 MENIERE'S DISEASE OF BOTH EARS: ICD-10-CM

## 2024-08-14 DIAGNOSIS — E11.42 TYPE 2 DIABETES MELLITUS WITH DIABETIC POLYNEUROPATHY, WITHOUT LONG-TERM CURRENT USE OF INSULIN: Primary | Chronic | ICD-10-CM

## 2024-08-14 DIAGNOSIS — G89.29 CHRONIC NECK PAIN: ICD-10-CM

## 2024-08-14 DIAGNOSIS — F33.0 MILD RECURRENT MAJOR DEPRESSION: ICD-10-CM

## 2024-08-14 DIAGNOSIS — K21.9 GERD WITHOUT ESOPHAGITIS: ICD-10-CM

## 2024-08-14 DIAGNOSIS — E78.2 MIXED HYPERLIPIDEMIA: ICD-10-CM

## 2024-08-14 DIAGNOSIS — I50.20 HFREF (HEART FAILURE WITH REDUCED EJECTION FRACTION): ICD-10-CM

## 2024-08-14 DIAGNOSIS — F51.01 PRIMARY INSOMNIA: ICD-10-CM

## 2024-08-14 DIAGNOSIS — Z99.2 ESRD (END STAGE RENAL DISEASE) ON DIALYSIS: ICD-10-CM

## 2024-08-14 DIAGNOSIS — I48.11 LONGSTANDING PERSISTENT ATRIAL FIBRILLATION: Chronic | ICD-10-CM

## 2024-08-14 DIAGNOSIS — M54.2 CHRONIC NECK PAIN: ICD-10-CM

## 2024-08-14 PROCEDURE — 99999 PR PBB SHADOW E&M-EST. PATIENT-LVL IV: CPT | Mod: PBBFAC,,, | Performed by: INTERNAL MEDICINE

## 2024-08-14 NOTE — PROGRESS NOTES
[facility] Note    Chief Complaint      Chief Complaint   Patient presents with    Diabetes     6 mth       History of Present Illness      History of Present Illness  The patient presents for evaluation of multiple medical concerns. She is accompanied by an adult male.    She reports experiencing low blood pressure today, which has left her feeling weak and sluggish. Her home blood pressure reading this morning was 80/81. She took midodrine to manage this. After physical therapy, she was given a tennis shoe with a light sole and some chips, which helped elevate her blood pressure to 97. She notes that her blood pressure often starts around 120 during dialysis but drops prison through the session. She is requesting a refill of her midodrine prescription. She continues to take valsartan twice daily, as advised by Dr. Morley. During her last visit with Dr. Morley, her heart was functioning at 25 percent, but as of Monday, it was reported to be normal. She has been reducing her salt intake and taking half a pill of valsartan at night. The accompanying adult male mentions that her blood pressure readings were in the 128 to 130 range when they started reducing the medication. He also notes that she was coughing a lot before, but drinking tea with honey three times a day has helped. He believes her heart function has improved due to reduced congestion. She has not experienced any issues with atrial fibrillation, palpitations, or episodes. She feels normal overall, but is particularly tired today, which she attributes to her low blood pressure. She has started drinking a protein shake daily, which has helped increase her protein levels.    She expresses concern about her right ear feeling full and occasionally experiencing hearing loss. She also reports an increase in buzzing sounds. She likens the sensation to being on an airplane. She has an appointment with an ENT specialist in [place] in about a week  and a half.    She has noticed that urine is being expelled from her anus during bowel movements. She consulted with Dr. Valenzuela, who referred her to a surgeon. The surgeon was unsure of the cause and suggested a flexible sigmoidoscopy, pending approval from her cardiologist. A few weeks ago, her cardiologist denied the request due to her heart function being at 25 percent. However, now that her heart function is normal, the procedure has been approved. She is waiting for a call from Dr. Conway to schedule the procedure. She assumes the fluid is urine as she is producing very little urine.    Supplemental Information  She is losing her hair.  DM2 with neuropathy: Sees endo NP Sherine Josefina.  Previously controlled on metformin prior to ARF, transitioned to insulin.  Lantus 4u AM, 3u PM, novolog SSI.  A1C 4.9. Eye exam UTD with Dr. Rivera 2023.  Foot exam UTD with Dr. Bryan previously.  HLD: Controlled on lipitor.  LDL 64, stable from last check.    ESRD: Sees Dr. Vyas, nephrology.  HD T Th Sat. On midodrine for BP support during HD.  A fib: Sees EP cardiology, Dr. Woody.  On Amiodarone, carvedilol for rate control.  On aspirin for anticoagulation.  HFrEF: Sees cardiology, Dr. Umberto Thibodeaux.  On carvedilol, valsartan, torsemide.  GERD: Controlled on pantoprazole BID.  No new or worsening symptoms.  Depression: Controlled on effexor.  No new or worsening symptoms  Insomnia: Previously on temazepam.  Chronic neck pain 2/2 cervical degenerative disc disease: . No new exacerbations.  Off gabapentin 2/2 no perceived benefit.  Using combination of vitamin supplement and topical neuropathy treatment for feet.  Had lumbar sympathetic nerve block which was effective for a few months.   Celiac disease, IBS: previously saw Dr. Amor.  Avoiding triggers to avoid exacerbations.  Doing well with diet control. Saw Dr. Hutton who started on dicyclomine which has helped.  Taking miralax.    Meniere's disease: Controlled  Flu  shot missed last season.  TdAP 2015.  Prevnar UTD, pneumovax UTD.  Shingrix discussed.  COVID vaccine UTD, due for booster.  Mammogram deferred due to recent health developements.  DEXA 2015, normal.  Cscope 2018, Dr. Amor, no polyps, 5 yr interval.     Assessment/Plan     Lily Green is a 74 y.o.female with:    Assessment & Plan  1. Hypotension.  Blood pressure has been low, with readings as low as 80/81 at home. It increased to 97 before leaving the house and was 98 upon arrival. The nephrology team will continue managing midodrine. Valsartan dosage will be reduced to half a tablet once daily in the morning, with the evening dose being optional. If heart function worsens, the evening dose may be reintroduced. Blood pressure should be monitored regularly.    2. Right ear fullness.  Symptoms include fullness and buzzing in the right ear, possibly due to wax buildup or fluid in the middle ear. An ENT appointment is scheduled in about a week and a half with Dr. Wild in [place]. The ENT specialist may clear the wax and consider nasal sprays to facilitate sinus drainage and unblock the eustachian tube.    3. 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  Urine-like fluid is being passed during bowel movements. A flexible sigmoidoscopy has been authorized by the cardiologist to investigate a potential fistula between the rectum and bladder. Dr. Calvert will perform the procedure to determine the source of the leakage.    4. Medication Management.  A refill for midodrine was requested but will be managed by the nephrology team. Valsartan will be taken as half a tablet once daily in the morning, with the evening dose being optional based on blood pressure readings.      1. Type 2 diabetes mellitus with diabetic polyneuropathy, without long-term current use of insulin    2. Mixed hyperlipidemia    3. ESRD (end stage renal disease) on dialysis    4. Longstanding persistent atrial fibrillation    5. HFrEF (heart failure  with reduced ejection fraction)    6. GERD without esophagitis    7. Mild recurrent major depression    8. Primary insomnia    9. Chronic neck pain    10. Celiac disease    11. Meniere's disease of both ears      Chronic conditions status updated as per HPI.  Other than changes above, cont current medications and maintain follow up with specialists.  No follow-ups on file.    Future Appointments   Date Time Provider Department Center   2024 11:00 AM Sherine Rocha, AZUL [facility] ENDOCRN [facility]   2024 10:20 AM Lily Moulton, PTA S[facility] OP [facility]   2024 11:40 AM Prabhu Santo, PT [facility] OP [facility]   2024 11:40 AM Prabhu Santo, PT SCP OP [facility]   2024 11:40 AM Prabhu Santo, PT SCP OP [facility]   2024 11:00 AM Lucas Bryan, ANDREAM [facility] POD [place]   2024  9:40 AM COORDINATED DEVICE CHECK [facility] ARRHPRO [facility]   2024 10:00 AM EKG, APPT [facility] EKG [facility]   2024 10:20 AM LENIN Frances MD [facility] ARRHYTH [facility]   2025  2:15 PM Pavel Calixto MD [facility]           Past Medical History:  Past Medical History:   Diagnosis Date    Anxiety     Celiac disease 2018    Celiac disease     Depression     Diabetes mellitus     Family history of breast cancer in mother      at age 68    Hyperlipidemia     Hypertension     Meniere disease     Meniere's disease, unspecified ear     Menopause     On supplemental oxygen by nasal cannula 2023    Hypoxic respiratory failure resolved.  No oxygen desaturation on walk.        Peptic ulcer     Peritonitis 2023    Pleural effusion on left 01/15/2024    C/w transudate in setting of acute decompensated heart failure due to a-fib and volume overload.  Now appears euvolemic in clinic today, resolved.       Reflux esophagitis     Urinary tract infection     Vaginal infection     Vaginal Pap smear 2014    Pap/Hpv Negative        Past Surgical History:   has a past surgical history that  includes Tonsillectomy; Appendectomy; Dilation and curettage of uterus (1986); Hysterectomy (1987); Carpal tunnel release (Bilateral, 09/2017); Shoulder surgery (2009 & 2010); Colonoscopy (04/2018); Upper gastrointestinal endoscopy (04/2018); Dupuytren contracture release (Bilateral, 09/2017); Breast surgery; Injection of neurolytic agent around lumbar sympathetic nerve (N/A, 01/06/2022); Injection of neurolytic agent around lumbar sympathetic nerve (N/A, 08/25/2022); laparotomy, exploratory (N/A, 01/14/2023); laparotomy, exploratory (N/A, 01/16/2023); closure, colostomy (Left, 01/16/2023); Colostomy (Right, 01/16/2023); Insertion of tunneled central venous hemodialysis catheter (Right, 01/25/2023); removal, tunneled cath (Right, 01/25/2023); insertion, catheter, tunneled (Left, 01/28/2023); Removal of catheter (Right, 01/28/2023); robotic bronchoscopy (N/A, 10/20/2023); pacemaker, temporary, transvenous, pediatric (Right, 01/12/2024); Implantation of leadless pacemaker (N/A, 01/16/2024); Esophagogastroduodenoscopy (N/A, 01/24/2024); Esophagogastroduodenoscopy (N/A, 01/26/2024); lymphadenectomy (Right, 01/02/2024); thoracotomy (Right, 01/02/2024); thoracotomy, with initial therapeutic wedge resection of lung (Right, 01/02/2024); Coronary angiography (N/A, 02/21/2024); and Left heart catheterization (Left, 02/21/2024).    Family History:  family history includes Arthritis in her father; Breast cancer in her mother and paternal aunt; Cancer in her mother and paternal aunt; Diabetes in her paternal grandfather; Hyperlipidemia in her sister; Vision loss in her father, mother, and sister.     Social History:  Social History     Tobacco Use    Smoking status: Never    Smokeless tobacco: Never   Substance Use Topics    Alcohol use: Not Currently     Comment: Use to drink. Haven't had a drink in the past 10 years.    Drug use: Never       Medications:  Outpatient Encounter Medications as of 8/14/2024   Medication Sig Note  "Dispense Refill    amiodarone (PACERONE) 200 MG Tab Take 1 tablet (200 mg total) by mouth once daily.  180 tablet 3    aspirin (ECOTRIN) 81 MG EC tablet Take 1 tablet (81 mg total) by mouth once daily.  90 tablet 3    atorvastatin (LIPITOR) 40 MG tablet Take 1 tablet (40 mg total) by mouth every evening.  90 tablet 3    blood sugar diagnostic (TRUE METRIX GLUCOSE TEST STRIP) Strp USE ONE STRIP FOR TESTING 2 TIMES A DAY  200 each 3    carvediloL (COREG) 6.25 MG tablet Take 1 tablet (6.25 mg total) by mouth 2 (two) times daily with meals.  180 tablet 3    dicyclomine (BENTYL) 10 MG capsule Take 10 mg by mouth 3 (three) times daily.       insulin aspart U-100 (NOVOLOG) 100 unit/mL (3 mL) InPn pen Inject before meals:   201-250=+2, 251-300=+3; 301-350=+4, over 350=+5 units  15 mL 0    lancets Misc 1 lancet by Misc.(Non-Drug; Combo Route) route 4 (four) times daily.  400 each 3    levothyroxine (SYNTHROID) 112 MCG tablet Take 1 tablet (112 mcg total) by mouth before breakfast.  90 tablet 3    linaGLIPtin (TRADJENTA) 5 mg Tab tablet Take 1 tablet (5 mg total) by mouth once daily.  90 tablet 1    loperamide (IMODIUM) 2 mg capsule Take 2 mg by mouth once as needed for Diarrhea.       meclizine (ANTIVERT) 25 mg tablet Take 1 tablet (25 mg total) by mouth 3 (three) times daily as needed for Dizziness.  90 tablet 3    midodrine (PROAMATINE) 10 MG tablet 10 mg 2 (two) times a day.       ondansetron (ZOFRAN) 4 MG tablet Take 4 mg by mouth every 4 (four) hours as needed for Nausea.       pen needle, diabetic (BD ULTRA-FINE MARIS PEN NEEDLE) 32 gauge x 5/32" Ndle 1 Device by Misc.(Non-Drug; Combo Route) route 2 (two) times a day.  200 each 3    pregabalin (LYRICA) 75 MG capsule Take 1 capsule (75 mg total) by mouth every other day. Give after HD  45 capsule 2    MELLISSA-BENJY RX 1- mg-mg-mcg Tab Take 1 tablet by mouth.       torsemide (DEMADEX) 20 MG Tab Take 1 tablet (20 mg total) by mouth 2 (two) times daily.  180 tablet 3    " valsartan (DIOVAN) 40 MG tablet Take 0.5 tablets (20 mg total) by mouth 2 (two) times daily.  30 tablet 11    azelastine (ASTELIN) 137 mcg (0.1 %) nasal spray 2 sprays (274 mcg total) by Nasal route 2 (two) times daily.  30 mL 6    blood-glucose meter kit Use to test twice a day  1 each 0    pantoprazole (PROTONIX) 40 MG tablet Take 1 tablet (40 mg total) by mouth 2 (two) times daily before meals.  180 tablet 3    [DISCONTINUED] albuterol-ipratropium (DUO-NEB) 2.5 mg-0.5 mg/3 mL nebulizer solution Take 3 mLs by nebulization every 6 (six) hours as needed for Wheezing. Rescue  240 mL 11    [DISCONTINUED] betahistine HCl (BETAHISTINE, BULK, MISC)  12/28/2023: Hold am of surgery      [DISCONTINUED] calcium-vitamin D3 (OS-CIPRIANO 500 + D3) 500 mg-5 mcg (200 unit) per tablet Take 1 tablet by mouth once daily.  30 tablet 3    [DISCONTINUED] carvediloL (COREG) 6.25 MG tablet Take 1 tablet (6.25 mg total) by mouth 2 (two) times daily with meals.  180 tablet 3    [DISCONTINUED] ferrous gluconate (FERGON) 324 MG tablet Take 1 tablet (324 mg total) by mouth daily with breakfast.  90 tablet 3    [DISCONTINUED] torsemide (DEMADEX) 20 MG Tab Take 1 tablet (20 mg total) by mouth 2 (two) times daily.  180 tablet 3    [DISCONTINUED] valsartan (DIOVAN) 40 MG tablet Take 0.5 tablets (20 mg total) by mouth 2 (two) times daily.  30 tablet 11    [DISCONTINUED] venlafaxine (EFFEXOR) 37.5 MG Tab Take 1 tablet (37.5 mg total) by mouth once daily.  90 tablet 3    [DISCONTINUED] vit C,B-Mv-lnwdp-lutein-zeaxan (PRESERVISION AREDS 2) 999-690-00-1 mg-unit-mg-mg Cap         No facility-administered encounter medications on file as of 8/14/2024.       Allergies:  Review of patient's allergies indicates:   Allergen Reactions    Crestor [rosuvastatin] Swelling    Gluten protein        Health Maintenance:  Immunization History   Administered Date(s) Administered    COVID-19, MRNA, LN-S, PF (Pfizer) (Purple Cap) 02/13/2021, 03/06/2021    Influenza  "11/30/2012    Influenza (FLUAD) - Quadrivalent - Adjuvanted - PF *Preferred* (65+) 10/03/2020    Influenza Split 10/15/2019    Pneumococcal Conjugate - 13 Valent 04/27/2018    Pneumococcal Polysaccharide - 23 Valent 10/14/2019      Health Maintenance   Topic Date Due    TETANUS VACCINE  Never done    Shingles Vaccine (1 of 2) Never done    Mammogram  12/28/2023    Hemoglobin A1c  05/15/2024    Eye Exam  12/13/2024    Lipid Panel  02/16/2025    Foot Exam  06/28/2025    DEXA Scan  01/03/2026    Hepatitis C Screening  Completed        Physical Exam      Vital Signs  Pulse: 61  SpO2: 97 %  BP: (!) 98/56  BP Location: Right arm  Patient Position: Sitting  Height and Weight  Height: 5' 5" (165.1 cm)  Weight: 51.9 kg (114 lb 6.7 oz)  BSA (Calculated - sq m): 1.54 sq meters  BMI (Calculated): 19  Weight in (lb) to have BMI = 25: 149.9]    Physical Exam  No fluid in lungs.  Heart sounds are normal.  Pulses are normal. No swelling.    Vital Signs  Blood pressure was 98 today.    Physical Exam    Laboratory:    Results      CBC:  Recent Labs   Lab 03/29/24  0443 05/11/24  1521 08/12/24  0906   WBC 11.01 12.84 H 9.23   RBC 3.30 L 3.38 L 4.24   Hemoglobin 9.5 L 9.1 L 12.3   Hematocrit 31.3 L 30.9 L 41.5   Platelets 334 374 297   MCV 95 91 98   MCH 28.8 26.9 L 29.0   MCHC 30.4 L 29.4 L 29.6 L     CMP:  Recent Labs   Lab 03/29/24  0443 05/11/24  1521 08/12/24  0906   Glucose 173 H 147 H 196 H   Calcium 9.3 10.2 10.7 H   Albumin 2.0 L 2.2 L 2.9 L   Total Protein 6.9 8.1 8.2   Sodium 131 L 137 133 L   Potassium 6.0 H 3.2 L 3.5   CO2 22 L 25 27   Chloride 99 97 94 L   BUN 35 H 16 32 H   Alkaline Phosphatase 103 120 163 H   ALT 15 35 30   AST 33 47 H 24   Total Bilirubin 0.3 0.6 0.3     URINALYSIS:  Recent Labs   Lab 08/03/23  1055 09/27/23  0948 05/28/24  1717   Color, UA Yellow Yellow  --    Specific [place], UA 1.015 1.010  --    pH, UA 6.0 >8.0 A 6.0   Protein, UA 3+ A 1+ A  --    Bacteria Many A None  --    Nitrite, UA Negative " Negative  --    Leukocytes, UA 3+ A Negative  --    Urobilinogen, UA Negative  --   --    Hyaline Casts, UA 0 0  --       LIPIDS:  Recent Labs   Lab 11/01/22  0901 01/13/23  2348 01/14/23  1247 11/15/23  0802 02/12/24  0556 02/16/24  1553 03/26/24  1230   TSH  --  1.821  --   --  18.183 H  --  19.949 H   HDL 54  --   --  45  --  39 L  --    Cholesterol 149  --   --  150  --  125  --    Triglycerides 90  --  96 201 H  --  128  --    LDL Cholesterol 77.0  --   --  64.8  --  60.4 L  --    HDL/Cholesterol Ratio 36.2  --   --  30.0  --  31.2  --    Non-HDL Cholesterol 95  --   --  105  --  86  --    Total Cholesterol/HDL Ratio 2.8  --   --  3.3  --  3.2  --      TSH:  Recent Labs   Lab 01/13/23  2348 02/12/24  0556 03/26/24  1230   TSH 1.821 18.183 H 19.949 H     A1C:  Recent Labs   Lab 10/19/21  0907 04/22/22  0910 11/01/22  0901 07/11/23  0707 11/15/23  0802   Hemoglobin A1C 6.2 H 6.0 H 5.8 H 5.3 4.9           This note was generated with the assistance of ambient listening technology. Verbal consent was obtained by the patient and accompanying visitor(s) for the recording of patient appointment to facilitate this note. I attest to having reviewed and edited the generated note for accuracy, though some syntax or spelling errors may persist. Please contact the author of this note for any clarification.      Pavel Calixto MD  [facility]

## 2024-08-15 ENCOUNTER — PATIENT MESSAGE (OUTPATIENT)
Dept: ADMINISTRATIVE | Facility: HOSPITAL | Age: 74
End: 2024-08-15
Payer: MEDICARE

## 2024-08-20 NOTE — PROGRESS NOTES
CC: This 74 y.o. White female  is here for evaluation of  T2DM along with comorbidities indicated in the Visit Diagnosis section of this encounter.    HPI: Lily Green was diagnosed with T2DM in 50s. Metformin started at the time of diagnosis.   Med hx: celiac disease, ESRD on T/Th/Sat, atrial fibrillation, CHF, non-ischemic cardiomyopathy     DM COMPLICATIONS: nephropathy          Prior visit 5/2024 Arrives with  Deric and caregiver Rocky.   Pt last seen several months ago.    In 1/2024, she was hospitalized for parapneumonic effusion, a fib with RVR and treated for pneumonia. Discharged to SNF. In 2/2024, hospitalized again for respiratory failure due to A fib and systolic heart failure and treated for pneumonia. Discharged back to SNF. On 3/26 she was placed in observation from SNF due to labs showing hypokalemia (K 2.7). Potassium and phosphorus replaced and transfused pRBC. Once potassium normalized, pt discharged back to SNF.   She has lost 20 lb since last visit.   Plan Fasting glucoses are a bit high. Will hold off on resuming basal insulin and try Tradjenta first. Advised:   Start Tradjenta 5 mg once daily.   Continue Novolog sliding scale.   Pt prefers fingersticks over Dexcom.   Test sugar 2x/day and send a sugar log in 2 weeks.   Return to clinic in 3 months. A1c added to lab appt in July. Expect A1c to be falsely low however d/t anemia.     Interval hx Arrives with  Deric and caregiver Rocky.   Glucoses have been doing well.   Pt's BP today is 82/38. BP has been running low. She is taking midodrine to elevate BP. Continues to take carvedilol and valsartan per Cardiology. Pt denies dizziness.         LAST DIABETES EDUCATION: none, has attended a celiac disease and diabetes seminar years ago.     HOSPITALIZED FOR DIABETES  -  No.    SIGNIFICANT DIABETES MED HISTORY:   Lantus stopped mid 2023     PRESCRIBED DIABETES MEDICATIONS:   Tradjenta 5 mg daily   Novolog ss ac:   201-250=+4; 251-300=+6; 301-350=+8, over 350=+10 units    Misses medication doses - as above     Rotates insulin injections:  abdomen    Changes insulin pen needles: yes      SELF MONITORING BLOOD GLUCOSE: Checks blood glucose at home 2x/day.             HYPOGLYCEMIC EPISODES: none recent         CURRENT DIET: drinks water and 1 decaf coffee in AM, Novosource protein shake. Eats 3 meals/day. No snacks.   Dinner 6:30 to 7     CURRENT EXERCISE:       BP (!) 82/38   Pulse 62   Temp (P) 98 °F (36.7 °C)   Wt 52.2 kg (115 lb)   LMP  (LMP Unknown) Comment: BEAU/ NO BSO  1986  BMI 19.14 kg/m²       ROS:   CONSTITUTIONAL: Appetite low, +  fatigue        PHYSICAL EXAM:  GENERAL: Well developed, well nourished. No acute distress.   PSYCH: AAOx3, appropriate mood and affect, conversant, very well-groomed. Judgement and insight good.   NEURO: Cranial nerves grossly intact. Speech clear, no tremor.   CHEST: Respirations even and unlabored.          Hemoglobin A1C   Date Value Ref Range Status   11/15/2023 4.9 4.0 - 5.6 % Final     Comment:     ADA Screening Guidelines:  5.7-6.4%  Consistent with prediabetes  >or=6.5%  Consistent with diabetes    High levels of fetal hemoglobin interfere with the HbA1C  assay. Heterozygous hemoglobin variants (HbS, HgC, etc)do  not significantly interfere with this assay.   However, presence of multiple variants may affect accuracy.     07/11/2023 5.3 4.0 - 5.6 % Final     Comment:     ADA Screening Guidelines:  5.7-6.4%  Consistent with prediabetes  >or=6.5%  Consistent with diabetes    High levels of fetal hemoglobin interfere with the HbA1C  assay. Heterozygous hemoglobin variants (HbS, HgC, etc)do  not significantly interfere with this assay.   However, presence of multiple variants may affect accuracy.     11/01/2022 5.8 (H) 4.0 - 5.6 % Final     Comment:     ADA Screening Guidelines:  5.7-6.4%  Consistent with prediabetes  >or=6.5%  Consistent with diabetes    High levels of fetal  hemoglobin interfere with the HbA1C  assay. Heterozygous hemoglobin variants (HbS, HgC, etc)do  not significantly interfere with this assay.   However, presence of multiple variants may affect accuracy.     10/23/2019 6.8 % Final       Lab Results   Component Value Date    CPEPTIDE 5.63 (H) 07/11/2023    GLUTAMICACID 0.00 07/11/2023    ISLETCELLANT <1:4 07/11/2023        Lab Results   Component Value Date    CHOL 125 02/16/2024    CHOL 150 11/15/2023    CHOL 149 11/01/2022     Lab Results   Component Value Date    HDL 39 (L) 02/16/2024    HDL 45 11/15/2023    HDL 54 11/01/2022     Lab Results   Component Value Date    LDLCALC 60.4 (L) 02/16/2024    LDLCALC 64.8 11/15/2023    LDLCALC 77.0 11/01/2022     Lab Results   Component Value Date    TRIG 128 02/16/2024    TRIG 201 (H) 11/15/2023    TRIG 96 01/14/2023     Lab Results   Component Value Date    CHOLHDL 31.2 02/16/2024    CHOLHDL 30.0 11/15/2023    CHOLHDL 36.2 11/01/2022         Component Value Date/Time     (L) 08/12/2024 0906    K 3.5 08/12/2024 0906    CL 94 (L) 08/12/2024 0906    CO2 27 08/12/2024 0906    BUN 32 (H) 08/12/2024 0906    CREATININE 5.7 (H) 08/12/2024 0906     (H) 08/12/2024 0906    CALCIUM 10.7 (H) 08/12/2024 0906    ALKPHOS 163 (H) 08/12/2024 0906    AST 24 08/12/2024 0906    ALT 30 08/12/2024 0906    BILITOT 0.3 08/12/2024 0906    EGFRNORACEVR 7.3 (A) 08/12/2024 0906    ESTGFRAFRICA 6 10/14/2023 0436    ESTGFRAFRICA >60.0 04/22/2022 0910           Lab Results   Component Value Date    LABMICR 17.0 11/16/2023    CREATRANDUR 21.0 11/16/2023    MICALBCREAT 81.0 (H) 11/16/2023             ASSESSMENT and PLAN:    A1C GOAL:     1. Type 2 diabetes mellitus with other kidney complication, unspecified whether long term insulin use  Glucoses are well-controlled in light of advanced age and co-morbidities.   Continue Tradjenta and Novolog ss.   Test glucose 1-2x/day.   Rtc prn.   F/u with Primary for chronic DM management.       2. ESRD  (end stage renal disease) on dialysis  Avoid hypoglycemia.   Continue Tradjenta which has low hypoglycemia risk.                No orders of the defined types were placed in this encounter.       Follow up if symptoms worsen or fail to improve.

## 2024-08-21 ENCOUNTER — OFFICE VISIT (OUTPATIENT)
Dept: ENDOCRINOLOGY | Facility: CLINIC | Age: 74
End: 2024-08-21
Payer: MEDICARE

## 2024-08-21 VITALS
HEART RATE: 62 BPM | SYSTOLIC BLOOD PRESSURE: 82 MMHG | BODY MASS INDEX: 19.14 KG/M2 | DIASTOLIC BLOOD PRESSURE: 38 MMHG | WEIGHT: 115 LBS

## 2024-08-21 DIAGNOSIS — Z99.2 ESRD (END STAGE RENAL DISEASE) ON DIALYSIS: ICD-10-CM

## 2024-08-21 DIAGNOSIS — N18.6 ESRD (END STAGE RENAL DISEASE) ON DIALYSIS: ICD-10-CM

## 2024-08-21 DIAGNOSIS — E11.29 TYPE 2 DIABETES MELLITUS WITH OTHER KIDNEY COMPLICATION, UNSPECIFIED WHETHER LONG TERM INSULIN USE: Primary | ICD-10-CM

## 2024-08-21 PROCEDURE — 99999 PR PBB SHADOW E&M-EST. PATIENT-LVL III: CPT | Mod: PBBFAC,,, | Performed by: NURSE PRACTITIONER

## 2024-08-21 RX ORDER — LINAGLIPTIN 5 MG/1
5 TABLET, FILM COATED ORAL DAILY
Qty: 90 TABLET | Refills: 1 | Status: SHIPPED | OUTPATIENT
Start: 2024-08-21 | End: 2025-08-21

## 2024-08-21 NOTE — Clinical Note
Kendall Calixto, pt is being transferred back to [facility] with controlled glucoses on Tradjenta. She has appt with you in Feb. Thanks, Sherine Rocha, NP

## 2024-08-22 RX ORDER — MIDODRINE HYDROCHLORIDE 10 MG/1
10 TABLET ORAL EVERY 8 HOURS
Qty: 120 TABLET | Refills: 3 | Status: SHIPPED | OUTPATIENT
Start: 2024-08-22

## 2024-09-06 NOTE — Clinical Note
The groin was prepped. The site was prepped with ChloraPrep. The site was clipped. The patient was draped. not applicable (Male)

## 2024-09-19 DIAGNOSIS — N19 HYPERPHOSPHATEMIA ASSOCIATED WITH RENAL FAILURE: Primary | ICD-10-CM

## 2024-09-19 DIAGNOSIS — E83.39 HYPERPHOSPHATEMIA ASSOCIATED WITH RENAL FAILURE: Primary | ICD-10-CM

## 2024-09-19 RX ORDER — SEVELAMER CARBONATE 800 MG/1
TABLET, FILM COATED ORAL
Qty: 90 TABLET | Refills: 0 | Status: SHIPPED | OUTPATIENT
Start: 2024-09-19

## 2024-09-23 ENCOUNTER — CLINICAL SUPPORT (OUTPATIENT)
Dept: CARDIOLOGY | Facility: HOSPITAL | Age: 74
End: 2024-09-23
Payer: MEDICARE

## 2024-09-23 ENCOUNTER — CLINICAL SUPPORT (OUTPATIENT)
Dept: CARDIOLOGY | Facility: HOSPITAL | Age: 74
End: 2024-09-23
Attending: STUDENT IN AN ORGANIZED HEALTH CARE EDUCATION/TRAINING PROGRAM
Payer: MEDICARE

## 2024-09-23 DIAGNOSIS — Z95.0 PRESENCE OF CARDIAC PACEMAKER: ICD-10-CM

## 2024-09-23 DIAGNOSIS — R00.1 BRADYCARDIA, UNSPECIFIED: ICD-10-CM

## 2024-09-23 PROCEDURE — 93294 REM INTERROG EVL PM/LDLS PM: CPT | Mod: ,,, | Performed by: STUDENT IN AN ORGANIZED HEALTH CARE EDUCATION/TRAINING PROGRAM

## 2024-09-23 PROCEDURE — 93296 REM INTERROG EVL PM/IDS: CPT | Performed by: STUDENT IN AN ORGANIZED HEALTH CARE EDUCATION/TRAINING PROGRAM

## 2024-09-26 DIAGNOSIS — R06.02 SHORTNESS OF BREATH: Primary | ICD-10-CM

## 2024-09-26 DIAGNOSIS — N19 HYPERPHOSPHATEMIA ASSOCIATED WITH RENAL FAILURE: ICD-10-CM

## 2024-09-26 DIAGNOSIS — E83.39 HYPERPHOSPHATEMIA ASSOCIATED WITH RENAL FAILURE: ICD-10-CM

## 2024-09-26 RX ORDER — SEVELAMER CARBONATE 800 MG/1
800 TABLET, FILM COATED ORAL
Qty: 270 TABLET | Refills: 4 | Status: SHIPPED | OUTPATIENT
Start: 2024-09-26

## 2024-09-30 ENCOUNTER — OFFICE VISIT (OUTPATIENT)
Dept: PODIATRY | Facility: CLINIC | Age: 74
End: 2024-09-30
Payer: MEDICARE

## 2024-09-30 VITALS — WEIGHT: 115.06 LBS | BODY MASS INDEX: 19.17 KG/M2 | HEIGHT: 65 IN

## 2024-09-30 DIAGNOSIS — B35.1 ONYCHOMYCOSIS DUE TO DERMATOPHYTE: ICD-10-CM

## 2024-09-30 DIAGNOSIS — E11.42 DIABETIC POLYNEUROPATHY ASSOCIATED WITH TYPE 2 DIABETES MELLITUS: Primary | ICD-10-CM

## 2024-09-30 DIAGNOSIS — M20.42 HAMMER TOES OF BOTH FEET: ICD-10-CM

## 2024-09-30 DIAGNOSIS — L84 CORN OR CALLUS: ICD-10-CM

## 2024-09-30 DIAGNOSIS — M20.41 HAMMER TOES OF BOTH FEET: ICD-10-CM

## 2024-09-30 PROCEDURE — 99999 PR PBB SHADOW E&M-EST. PATIENT-LVL III: CPT | Mod: PBBFAC,,, | Performed by: PODIATRIST

## 2024-09-30 PROCEDURE — 11721 DEBRIDE NAIL 6 OR MORE: CPT | Mod: Q9,59,S$GLB, | Performed by: PODIATRIST

## 2024-09-30 PROCEDURE — 99499 UNLISTED E&M SERVICE: CPT | Mod: 25,S$GLB,, | Performed by: PODIATRIST

## 2024-09-30 PROCEDURE — 11056 PARNG/CUTG B9 HYPRKR LES 2-4: CPT | Mod: Q9,S$GLB,, | Performed by: PODIATRIST

## 2024-09-30 NOTE — PROGRESS NOTES
Subjective:      Patient ID: Lily Green is a 74 y.o. female.    Chief Complaint: Nail Care      Lily Kline is a 74 y.o. female who presents to the clinic for evaluation and treatment of diabetic feet. Lily Kline has a past medical history of Anxiety, Celiac disease (05/2018), Celiac disease, Depression, Diabetes mellitus, Family history of breast cancer in mother, Hyperlipidemia, Hypertension, Meniere disease, Meniere's disease, unspecified ear, Menopause, On supplemental oxygen by nasal cannula (01/28/2023), Peptic ulcer, Peritonitis (01/14/2023), Pleural effusion on left (01/15/2024), Reflux esophagitis, Urinary tract infection, Vaginal infection, and Vaginal Pap smear (04/07/2014). Relates having toenails toenails and calluses that are in need of trimming.   Has not attempted to self treat.  Notes an increase in LE paresthesias over the past month.  Symptoms are more present while at rest.  She continues taking Lyrcia with only modest improvement.  Denies any additional pedal complaints.    PCP: Pavel Calixto MD    Date Last Seen by PCP: 8/24  Hemoglobin A1C   Date Value Ref Range Status   11/15/2023 4.9 4.0 - 5.6 % Final     Comment:     ADA Screening Guidelines:  5.7-6.4%  Consistent with prediabetes  >or=6.5%  Consistent with diabetes    High levels of fetal hemoglobin interfere with the HbA1C  assay. Heterozygous hemoglobin variants (HbS, HgC, etc)do  not significantly interfere with this assay.   However, presence of multiple variants may affect accuracy.     07/11/2023 5.3 4.0 - 5.6 % Final     Comment:     ADA Screening Guidelines:  5.7-6.4%  Consistent with prediabetes  >or=6.5%  Consistent with diabetes    High levels of fetal hemoglobin interfere with the HbA1C  assay. Heterozygous hemoglobin variants (HbS, HgC, etc)do  not significantly interfere with this assay.   However, presence of multiple variants may affect accuracy.     11/01/2022 5.8 (H) 4.0 - 5.6 % Final     Comment:     ADA  Screening Guidelines:  5.7-6.4%  Consistent with prediabetes  >or=6.5%  Consistent with diabetes    High levels of fetal hemoglobin interfere with the HbA1C  assay. Heterozygous hemoglobin variants (HbS, HgC, etc)do  not significantly interfere with this assay.   However, presence of multiple variants may affect accuracy.     10/23/2019 6.8 % Final         Past Medical History:   Diagnosis Date    Anxiety     Celiac disease 2018    Celiac disease     Depression     Diabetes mellitus     Family history of breast cancer in mother      at age 68    Hyperlipidemia     Hypertension     Meniere disease     Meniere's disease, unspecified ear     Menopause     On supplemental oxygen by nasal cannula 2023    Hypoxic respiratory failure resolved.  No oxygen desaturation on walk.        Peptic ulcer     Peritonitis 2023    Pleural effusion on left 01/15/2024    C/w transudate in setting of acute decompensated heart failure due to a-fib and volume overload.  Now appears euvolemic in clinic today, resolved.       Reflux esophagitis     Urinary tract infection     Vaginal infection     Vaginal Pap smear 2014    Pap/Hpv Negative        Past Surgical History:   Procedure Laterality Date    APPENDECTOMY      BREAST SURGERY      Tags    CARPAL TUNNEL RELEASE Bilateral 2017    ,     CLOSURE, COLOSTOMY Left 2023    Procedure: CLOSURE, COLOSTOMY;  Surgeon: Manuel Javier MD;  Location: [facility] OR;  Service: General;  Laterality: Left;    COLONOSCOPY  2018    Normal  ([place] A)     COLOSTOMY Right 2023    Procedure: CREATION, COLOSTOMY;  Surgeon: Manuel Javier MD;  Location: [facility] OR;  Service: General;  Laterality: Right;    CORONARY ANGIOGRAPHY N/A 2024    Procedure: ANGIOGRAM, CORONARY ARTERY;  Surgeon: Todd Carlisle MD;  Location: [facility] CATH LAB;  Service: Cardiology;  Laterality: N/A;    DILATION AND CURETTAGE OF UTERUS      DUPUYTREN CONTRACTURE RELEASE  Bilateral 09/2017    ESOPHAGOGASTRODUODENOSCOPY N/A 01/24/2024    Procedure: EGD (ESOPHAGOGASTRODUODENOSCOPY);  Surgeon: Yamilet Walters MD;  Location: [facility] ENDO (2ND FLR);  Service: Gastroenterology;  Laterality: N/A;    ESOPHAGOGASTRODUODENOSCOPY N/A 01/26/2024    Procedure: EGD (ESOPHAGOGASTRODUODENOSCOPY);  Surgeon: Yamilet Walters MD;  Location: [facility] ENDO (2ND FLR);  Service: Gastroenterology;  Laterality: N/A;    HYSTERECTOMY  1987    BEAU w/ appy, no BSO     IMPLANTATION OF LEADLESS PACEMAKER N/A 01/16/2024    Procedure: INSERTION, CARDIAC PACEMAKER, LEADLESS;  Surgeon: LENIN Frances MD;  Location: [facility] EP LAB;  Service: Cardiology;  Laterality: N/A;  SB, LEADLESS PPM (MICRA), MDT, ANES, EH, CVICU [id number]    INJECTION OF NEUROLYTIC AGENT AROUND LUMBAR SYMPATHETIC NERVE N/A 01/06/2022    Procedure: BLOCK, LUMBAR SYMPATHETIC;  Surgeon: Souleymane Rizo Jr., MD;  Location: [facility] PAIN MGT;  Service: Pain Management;  Laterality: N/A;  no pacemaker   pt is diabetic     INJECTION OF NEUROLYTIC AGENT AROUND LUMBAR SYMPATHETIC NERVE N/A 08/25/2022    Procedure: BLOCK, LUMBAR SYMPATHETIC;  Surgeon: Souleymane Rizo Jr., MD;  Location: [facility] PAIN MGT;  Service: Pain Management;  Laterality: N/A;  diabetic     INSERTION OF TUNNELED CENTRAL VENOUS HEMODIALYSIS CATHETER Right 01/25/2023    Procedure: INSERTION, CATHETER, HEMODIALYSIS, DUAL LUMEN;  Surgeon: Manuel Javier MD;  Location: [facility] OR;  Service: General;  Laterality: Right;    INSERTION, CATHETER, TUNNELED Left 01/28/2023    Procedure: Insertion,catheter,tunneled;  Surgeon: Watson Fontenot MD;  Location: [facility] OR;  Service: General;  Laterality: Left;    LAPAROTOMY, EXPLORATORY N/A 01/14/2023    Procedure: LAPAROTOMY, EXPLORATORY;  Surgeon: Manuel Javier MD;  Location: [facility] OR;  Service: General;  Laterality: N/A;    LAPAROTOMY, EXPLORATORY N/A 01/16/2023    Procedure: LAPAROTOMY, EXPLORATORY;  Surgeon: Manuel Javier MD;  Location: [facility] OR;   Service: General;  Laterality: N/A;    LEFT HEART CATHETERIZATION Left 02/21/2024    Procedure: Left heart cath;  Surgeon: Todd Carlisle MD;  Location: [facility] CATH LAB;  Service: Cardiology;  Laterality: Left;    LYMPHADENECTOMY Right 01/02/2024    Procedure: LYMPHADENECTOMY;  Surgeon: Tan Thomson MD;  [facility];  Service: Cardiothoracic;  Laterality: Right;    PACEMAKER, TEMPORARY, TRANSVENOUS, PEDIATRIC Right 01/12/2024    Procedure: Pacemaker, Temporary, Transvenous;  Surgeon: Todd Carlisle MD;  Location: [facility] CATH LAB;  Service: Cardiology;  Laterality: Right;    REMOVAL OF CATHETER Right 01/28/2023    Procedure: REMOVAL, CATHETER;  Surgeon: Watson Fontenot MD;  Location: [facility];  Service: General;  Laterality: Right;    REMOVAL, TUNNELED CATH Right 01/25/2023    Procedure: REMOVAL, TUNNELED CATH;  Surgeon: Manuel Javier MD;  Location: [facility] OR;  Service: General;  Laterality: Right;    ROBOTIC BRONCHOSCOPY N/A 10/20/2023    Procedure: ROBOTIC BRONCHOSCOPY;  Surgeon: Mayda Monzon MD;  Location: [facility] OR [address] [place] [address];  Service: Pulmonary;  Laterality: N/A;    SHOULDER SURGERY  2009 & 2010    right rotator cuff    THORACOTOMY Right 01/02/2024    Procedure: THORACOTOMY;  Surgeon: Tan Thomson MD;  [address];  Service: Cardiothoracic;  Laterality: Right;    THORACOTOMY, WITH INITIAL THERAPEUTIC WEDGE RESECTION OF LUNG Right 01/02/2024    Procedure: THORACOTOMY, WITH INITIAL THERAPEUTIC WEDGE RESECTION OF LUNG;  Surgeon: Tan Thomson MD;  Location: [address];  Service: Cardiothoracic;  Laterality: Right;    TONSILLECTOMY      UPPER GASTROINTESTINAL ENDOSCOPY  04/2018       Family History   Problem Relation Name Age of Onset    Vision loss Father Shni Cervantes Jr.     Arthritis Father Shin Cervantes Jr.     Breast cancer Mother Megan Bordenman     Cancer Mother Megan Cervantes     Vision loss Mother Megan Cervantes     Hyperlipidemia Sister Sweetie  Maria A     Breast cancer Paternal Aunt Ibis Durbin     Cancer Paternal Aunt Ibis Durbin     Diabetes Paternal Grandfather Shin Cervantes, .     Vision loss Sister Noreen Saha     Kidney disease Neg Hx         Social History     Socioeconomic History    Marital status:    Tobacco Use    Smoking status: Never    Smokeless tobacco: Never   Substance and Sexual Activity    Alcohol use: Not Currently     Comment: Use to drink. Haven't had a drink in the past 10 years.    Drug use: Never    Sexual activity: Not Currently     Partners: Male     Birth control/protection: See Surgical Hx     Comment: :      Social Drivers of Health     Financial Resource Strain: Low Risk  (3/28/2024)    Overall Financial Resource Strain (CARDIA)     Difficulty of Paying Living Expenses: Not hard at all   Food Insecurity: No Food Insecurity (3/28/2024)    Hunger Vital Sign     Worried About Running Out of Food in the Last Year: Never true     Ran Out of Food in the Last Year: Never true   Transportation Needs: No Transportation Needs (3/28/2024)    PRAPARE - Transportation     Lack of Transportation (Medical): No     Lack of Transportation (Non-Medical): No   Physical Activity: Inactive (2/14/2024)    Exercise Vital Sign     Days of Exercise per Week: 0 days     Minutes of Exercise per Session: 0 min   Stress: Stress Concern Present (3/28/2024)    Citizen of [place] [employer] - Occupational Stress Questionnaire     Feeling of Stress : To some extent   Housing Stability: Low Risk  (3/28/2024)    Housing Stability Vital Sign     Unable to Pay for Housing in the Last Year: No     Number of Places Lived in the Last Year: 1     Unstable Housing in the Last Year: No       Current Outpatient Medications   Medication Sig Dispense Refill    amiodarone (PACERONE) 200 MG Tab Take 1 tablet (200 mg total) by mouth once daily. 180 tablet 3    aspirin (ECOTRIN) 81 MG EC tablet Take 1 tablet (81 mg total) by mouth once  "daily. 90 tablet 3    atorvastatin (LIPITOR) 40 MG tablet Take 1 tablet (40 mg total) by mouth every evening. 90 tablet 3    blood sugar diagnostic (TRUE METRIX GLUCOSE TEST STRIP) Strp USE ONE STRIP FOR TESTING 2 TIMES A  each 3    carvediloL (COREG) 6.25 MG tablet Take 1 tablet (6.25 mg total) by mouth 2 (two) times daily with meals. 180 tablet 3    dicyclomine (BENTYL) 10 MG capsule Take 10 mg by mouth 3 (three) times daily.      insulin aspart U-100 (NOVOLOG) 100 unit/mL (3 mL) InPn pen Inject before meals:   201-250=+2, 251-300=+3; 301-350=+4, over 350=+5 units 15 mL 0    lancets Misc 1 lancet by Misc.(Non-Drug; Combo Route) route 4 (four) times daily. 400 each 3    levothyroxine (SYNTHROID) 112 MCG tablet Take 1 tablet (112 mcg total) by mouth before breakfast. 90 tablet 3    linaGLIPtin (TRADJENTA) 5 mg Tab tablet Take 1 tablet (5 mg total) by mouth once daily. 90 tablet 1    loperamide (IMODIUM) 2 mg capsule Take 2 mg by mouth once as needed for Diarrhea.      meclizine (ANTIVERT) 25 mg tablet Take 1 tablet (25 mg total) by mouth 3 (three) times daily as needed for Dizziness. 90 tablet 3    midodrine (PROAMATINE) 10 MG tablet Take 1 tablet (10 mg total) by mouth every 8 (eight) hours. Can also take an extra dose with dialysis 120 tablet 3    ondansetron (ZOFRAN) 4 MG tablet Take 4 mg by mouth every 4 (four) hours as needed for Nausea.      pen needle, diabetic (BD ULTRA-FINE MARIS PEN NEEDLE) 32 gauge x 5/32" Ndle 1 Device by Misc.(Non-Drug; Combo Route) route 2 (two) times a day. 200 each 3    pregabalin (LYRICA) 75 MG capsule Take 1 capsule (75 mg total) by mouth every other day. Give after HD 45 capsule 2    MELLISSA-BENJY RX 1- mg-mg-mcg Tab Take 1 tablet by mouth.      sevelamer carbonate (RENVELA) 800 mg Tab Take 1 tablet (800 mg total) by mouth 3 (three) times daily with meals. . 270 tablet 4    torsemide (DEMADEX) 20 MG Tab Take 1 tablet (20 mg total) by mouth 2 (two) times daily. 180 tablet 3 "    valsartan (DIOVAN) 40 MG tablet Take 0.5 tablets (20 mg total) by mouth 2 (two) times daily. 30 tablet 11    azelastine (ASTELIN) 137 mcg (0.1 %) nasal spray 2 sprays (274 mcg total) by Nasal route 2 (two) times daily. 30 mL 6    blood-glucose meter kit Use to test twice a day 1 each 0    pantoprazole (PROTONIX) 40 MG tablet Take 1 tablet (40 mg total) by mouth 2 (two) times daily before meals. 180 tablet 3     No current facility-administered medications for this visit.       Review of patient's allergies indicates:   Allergen Reactions    Crestor [rosuvastatin] Swelling    Gluten protein          Review of Systems   Constitutional: Negative for chills and fever.   Cardiovascular:  Negative for claudication and leg swelling.   Skin:  Positive for color change and nail changes. Negative for suspicious lesions.   Musculoskeletal:  Positive for joint pain. Negative for joint swelling, muscle cramps and muscle weakness.   Gastrointestinal:  Negative for nausea and vomiting.   Neurological:  Positive for numbness and paresthesias.   Psychiatric/Behavioral:  Negative for altered mental status.            Objective:      Physical Exam  Constitutional:       General: She is not in acute distress.     Appearance: She is well-developed. She is not diaphoretic.   Cardiovascular:      Pulses:           Dorsalis pedis pulses are 2+ on the right side and 2+ on the left side.        Posterior tibial pulses are 1+ on the right side and 1+ on the left side.      Comments: CFT is < 3 seconds bilateral.  Pedal hair growth is decreased bilateral.  Mild non pitting lower extremity edema noted bilateral.  Toes are cool to touch bilateral.  Musculoskeletal:         General: Deformity present. No tenderness.      Comments: Muscle strength 5/5 in all muscle groups bilateral.   (-) for pain with palpation of bilateral foot and ankle. Semi-reducible contracture of toes 2-5 bilateral with adductovarus rotation of the 5th.   Skin:      "General: Skin is warm and dry.      Capillary Refill: Capillary refill takes less than 2 seconds.      Coloration: Skin is not pale.      Findings: Lesion present. No abrasion, bruising, burn, ecchymosis, erythema, petechiae or rash.      Comments: Pedal skin appears thin bilateral.  Toenails x 10 appear thickened by 2 mm, elongated by 4 mm, and discolored with subungual debris.  Hyperkeratotic lesion noted to bilateral sub 5th met head and plantar medial hallux.   Neurological:      Mental Status: She is alert and oriented to person, place, and time.      Sensory: Sensory deficit present.      Motor: No weakness or atrophy.      Comments: Protective sensation per Cumming-Corazon monofilament is decreased bilateral.  Vibratory sensation is decreased bilateral.  Light touch is intact bilateral.               Assessment:       Encounter Diagnoses   Name Primary?    Diabetic polyneuropathy associated with type 2 diabetes mellitus Yes    Hammer toes of both feet     Onychomycosis due to dermatophyte     Corn or callus            Plan:       Lily Montesinos" was seen today for nail care.    Diagnoses and all orders for this visit:    Diabetic polyneuropathy associated with type 2 diabetes mellitus    Hammer toes of both feet    Onychomycosis due to dermatophyte    Corn or callus        I counseled the patient on her conditions, their implications and medical management.      To continue wearing shoe gear that minimizes pressure to the digital deformities.    Shoe inspection. Diabetic Foot Education. Patient reminded of the importance of good nutrition and blood sugar control to help prevent podiatric complications of diabetes. Patient instructed on proper foot hygeine. We discussed wearing proper shoe gear, daily foot inspections, never walking without protective shoe gear, never putting sharp instruments to feet    With patient's permission, nails were aggressively reduced and debrided x 10 to their soft tissue " attachment mechanically and with electric , removing all offending nail and debris.  Also, a sterile # 15 scalpel was used to trim lesions x 4 down to smooth appearing skin without incident.  Patient relates relief following the procedure. She will continue to monitor the areas daily, inspect her feet, wear protective shoe gear when ambulatory, moisturizer to maintain skin integrity and follow in this office in approximately 3 months, sooner p.r.n.      Lucas Bryan DPM

## 2024-10-03 DIAGNOSIS — J90 PLEURAL EFFUSION: Primary | ICD-10-CM

## 2024-10-07 RX ORDER — LEVOTHYROXINE SODIUM 112 UG/1
112 TABLET ORAL
Qty: 90 TABLET | Refills: 3 | Status: SHIPPED | OUTPATIENT
Start: 2024-10-07

## 2024-10-07 NOTE — TELEPHONE ENCOUNTER
----- Message from Med Assistant Crum sent at 10/7/2024  9:55 AM CDT -----  Contact: Raisa @  [phone]  Requesting an RX refill or new RX.    RX name and strength: levothyroxine (SYNTHROID) 112 MCG tablet    Is this a refill or new RX: Refill    Is this a 30 day or 90 day RX: N/A    Pharmacy name and phone:     [facility] #[id number] [place] [address] [facility]  [address]  [facility] [address]  Phone: [phone] Fax: [phone]

## 2024-10-07 NOTE — TELEPHONE ENCOUNTER
No care due was identified.  Health [facility] Embedded Care Due Messages. Reference number: [id number].   10/07/2024 10:01:16 AM CDT

## 2024-10-08 ENCOUNTER — TELEPHONE (OUTPATIENT)
Dept: PRIMARY CARE CLINIC | Facility: CLINIC | Age: 74
End: 2024-10-08
Payer: MEDICARE

## 2024-10-08 ENCOUNTER — TELEPHONE (OUTPATIENT)
Dept: TRANSPLANT | Facility: CLINIC | Age: 74
End: 2024-10-08
Payer: MEDICARE

## 2024-10-08 DIAGNOSIS — F33.0 MILD RECURRENT MAJOR DEPRESSION: Primary | ICD-10-CM

## 2024-10-08 RX ORDER — VENLAFAXINE HYDROCHLORIDE 37.5 MG/1
37.5 CAPSULE, EXTENDED RELEASE ORAL DAILY
Qty: 90 CAPSULE | Refills: 3 | Status: SHIPPED | OUTPATIENT
Start: 2024-10-08 | End: 2025-10-08

## 2024-10-08 NOTE — TELEPHONE ENCOUNTER
----- Message from Phyllis sent at 10/8/2024  8:15 AM CDT -----  Contact: Caregiver/Rocky/[phone]  .1MEDICALADVICE     Patient is calling for Medical Advice regarding:wrong medication was requested on yesterday     Pharmacy name and phone#: [id number] - [place], [place] - [address] [facility]  [address]  [facility] [address]  Phone: [phone] Fax: [phone]       Patient wants a call back or thru [facility]: call back     Comments:pt's caregiver(Rocky) said that she is calling in regards to the wrong medication was requested on yesterday she said that patient needs a rx for Venlafaxine 37.5 mg sent to the pharmacy she stated that this is a medication that Dr Calixto prescribed for the patient she stated that patient is out of her medication.  Please advise     Please advise patient replies from provider may take up to 48 hours.

## 2024-10-08 NOTE — TELEPHONE ENCOUNTER
Spoke with Rocky she will contact Dr. Morley office to see if he still wants pt to take this medication since it's no longer on her medication list

## 2024-10-08 NOTE — TELEPHONE ENCOUNTER
We did not stop that medication will need to contact her PCP or whoever gave it to her originally, we did not start this or stop the medication.

## 2024-10-08 NOTE — TELEPHONE ENCOUNTER
----- Message from Unique sent at 10/8/2024  8:39 AM CDT -----  Regarding: refill  Janki Snell MA SJ    10/8/24  8:29 AM  Note  Spoke with Rocky she will contact Dr. Morley office to see if he still wants pt to take this medication since it's no longer on her medication list       Janki Snell MA SJ    10/8/24  8:23 AM  Note  ----- Message from Phyllis sent at 10/8/2024  8:15 AM CDT -----  Contact: Caregiver/Angelaalysha/[phone]  .1MEDICALADVICE      Patient is calling for Medical Advice regarding:wrong medication was requested on yesterday      [facility] phone#: [id number] - [facility] [address] [facility] [address]   [address]  [facility] [address]  Phone: [phone] Fax: [phone]        Patient wants a call back or thru [facility]: call back      Comments:pt's caregiver(Rocky) [phone] said that she is calling in regards to the wrong medication was requested on yesterday she said that patient needs a rx for Venlafaxine 37.5 mg sent to the pharmacy she stated that this is a medication that Dr Calixto prescribed for the patient she stated that patient is out of her medication.  Please advise

## 2024-10-08 NOTE — TELEPHONE ENCOUNTER
----- Message from Unique sent at 10/8/2024  8:39 AM CDT -----  Regarding: refill  Janki Snell MA SJ    10/8/24  8:29 AM  Note  Spoke with Rocky she will contact Dr. Morley office to see if he still wants pt to take this medication since it's no longer on her medication list       Janki Snell MA SJ    10/8/24  8:23 AM  Note  ----- Message from Phyllis sent at 10/8/2024  8:15 AM CDT -----  Contact: Caregiver/Angelaalysha/[phone]  .1MEDICALADVICE      Patient is calling for Medical Advice regarding:wrong medication was requested on yesterday      [facility]arm[facility] and phone#: [id number] - [facility] [address] [facility] [address]   [address]  [facility] [address]  Phone: [phone] Fax: [phone]        Patient wants a call back or thru [facility]: call back      Comments:pt's caregiver(Rocky) [phone] said that she is calling in regards to the wrong medication was requested on yesterday she said that patient needs a rx for Venlafaxine 37.5 mg sent to the pharmacy she stated that this is a medication that Dr Calixto prescribed for the patient she stated that patient is out of her medication.  Please advise

## 2024-10-09 LAB
OHS CV DC REMOTE DEVICE TYPE: NORMAL
OHS CV RV PACING PERCENT: 33.62 %

## 2024-10-17 DIAGNOSIS — E11.29 TYPE 2 DIABETES MELLITUS WITH OTHER KIDNEY COMPLICATION, UNSPECIFIED WHETHER LONG TERM INSULIN USE: ICD-10-CM

## 2024-10-17 RX ORDER — INSULIN ASPART 100 [IU]/ML
INJECTION, SOLUTION INTRAVENOUS; SUBCUTANEOUS
Qty: 15 ML | Refills: 0 | Status: SHIPPED | OUTPATIENT
Start: 2024-10-17

## 2024-10-28 DIAGNOSIS — E83.39 HYPERPHOSPHATEMIA ASSOCIATED WITH RENAL FAILURE: Primary | ICD-10-CM

## 2024-10-28 DIAGNOSIS — N19 HYPERPHOSPHATEMIA ASSOCIATED WITH RENAL FAILURE: Primary | ICD-10-CM

## 2024-10-28 RX ORDER — SEVELAMER CARBONATE 800 MG/1
800 TABLET, FILM COATED ORAL
Qty: 270 TABLET | Refills: 4 | Status: SHIPPED | OUTPATIENT
Start: 2024-10-28

## 2024-11-08 ENCOUNTER — OFFICE VISIT (OUTPATIENT)
Dept: ELECTROPHYSIOLOGY | Facility: CLINIC | Age: 74
End: 2024-11-08
Attending: STUDENT IN AN ORGANIZED HEALTH CARE EDUCATION/TRAINING PROGRAM
Payer: MEDICARE

## 2024-11-08 ENCOUNTER — HOSPITAL ENCOUNTER (OUTPATIENT)
Dept: CARDIOLOGY | Facility: CLINIC | Age: 74
Discharge: HOME | End: 2024-11-08
Attending: STUDENT IN AN ORGANIZED HEALTH CARE EDUCATION/TRAINING PROGRAM
Payer: MEDICARE

## 2024-11-08 ENCOUNTER — CLINICAL SUPPORT (OUTPATIENT)
Dept: CARDIOLOGY | Facility: HOSPITAL | Age: 74
End: 2024-11-08
Attending: STUDENT IN AN ORGANIZED HEALTH CARE EDUCATION/TRAINING PROGRAM
Payer: MEDICARE

## 2024-11-08 VITALS
DIASTOLIC BLOOD PRESSURE: 54 MMHG | SYSTOLIC BLOOD PRESSURE: 102 MMHG | WEIGHT: 121.5 LBS | HEART RATE: 62 BPM | HEIGHT: 65 IN | BODY MASS INDEX: 20.24 KG/M2

## 2024-11-08 DIAGNOSIS — K21.9 GERD WITHOUT ESOPHAGITIS: ICD-10-CM

## 2024-11-08 DIAGNOSIS — K90.0 CELIAC DISEASE: ICD-10-CM

## 2024-11-08 DIAGNOSIS — I10 PRIMARY HYPERTENSION: ICD-10-CM

## 2024-11-08 DIAGNOSIS — I77.0 A-V FISTULA: ICD-10-CM

## 2024-11-08 DIAGNOSIS — I48.0 PAROXYSMAL ATRIAL FIBRILLATION: ICD-10-CM

## 2024-11-08 DIAGNOSIS — E78.2 MIXED HYPERLIPIDEMIA: ICD-10-CM

## 2024-11-08 DIAGNOSIS — R00.1 SYMPTOMATIC BRADYCARDIA: ICD-10-CM

## 2024-11-08 DIAGNOSIS — I49.5 TACHYCARDIA-BRADYCARDIA SYNDROME: Primary | ICD-10-CM

## 2024-11-08 DIAGNOSIS — E03.9 ACQUIRED HYPOTHYROIDISM: ICD-10-CM

## 2024-11-08 DIAGNOSIS — K55.9 LARGE BOWEL ISCHEMIA: ICD-10-CM

## 2024-11-08 DIAGNOSIS — E11.42 TYPE 2 DIABETES MELLITUS WITH DIABETIC POLYNEUROPATHY, WITHOUT LONG-TERM CURRENT USE OF INSULIN: ICD-10-CM

## 2024-11-08 DIAGNOSIS — N18.6 ESRD (END STAGE RENAL DISEASE) ON DIALYSIS: ICD-10-CM

## 2024-11-08 DIAGNOSIS — I50.42 CHRONIC COMBINED SYSTOLIC AND DIASTOLIC HEART FAILURE: ICD-10-CM

## 2024-11-08 DIAGNOSIS — Z99.2 ESRD (END STAGE RENAL DISEASE) ON DIALYSIS: ICD-10-CM

## 2024-11-08 DIAGNOSIS — N18.6 ESRD (END STAGE RENAL DISEASE): ICD-10-CM

## 2024-11-08 DIAGNOSIS — I50.9 ACUTE DECOMPENSATED HEART FAILURE: ICD-10-CM

## 2024-11-08 DIAGNOSIS — F41.1 GENERALIZED ANXIETY DISORDER: ICD-10-CM

## 2024-11-08 DIAGNOSIS — K27.9 PEPTIC ULCER DISEASE: ICD-10-CM

## 2024-11-08 DIAGNOSIS — I48.3 TYPICAL ATRIAL FLUTTER: ICD-10-CM

## 2024-11-08 DIAGNOSIS — Z95.0 PACEMAKER: ICD-10-CM

## 2024-11-08 DIAGNOSIS — N18.6 ANEMIA IN ESRD (END-STAGE RENAL DISEASE): ICD-10-CM

## 2024-11-08 DIAGNOSIS — C7A.090 MALIGNANT CARCINOID TUMOR OF LUNG: ICD-10-CM

## 2024-11-08 DIAGNOSIS — C7A.090 MALIGNANT CARCINOID TUMOR OF BRONCHUS: ICD-10-CM

## 2024-11-08 DIAGNOSIS — I50.20 HFREF (HEART FAILURE WITH REDUCED EJECTION FRACTION): ICD-10-CM

## 2024-11-08 DIAGNOSIS — F33.0 MILD RECURRENT MAJOR DEPRESSION: ICD-10-CM

## 2024-11-08 DIAGNOSIS — I42.8 NONISCHEMIC CARDIOMYOPATHY: ICD-10-CM

## 2024-11-08 DIAGNOSIS — D63.1 ANEMIA IN ESRD (END-STAGE RENAL DISEASE): ICD-10-CM

## 2024-11-08 LAB
OHS QRS DURATION: 102 MS
OHS QTC CALCULATION: 464 MS

## 2024-11-08 PROCEDURE — 4010F ACE/ARB THERAPY RXD/TAKEN: CPT | Mod: CPTII,S$GLB,, | Performed by: STUDENT IN AN ORGANIZED HEALTH CARE EDUCATION/TRAINING PROGRAM

## 2024-11-08 PROCEDURE — 93005 ELECTROCARDIOGRAM TRACING: CPT | Mod: S$GLB,,, | Performed by: STUDENT IN AN ORGANIZED HEALTH CARE EDUCATION/TRAINING PROGRAM

## 2024-11-08 PROCEDURE — 99214 OFFICE O/P EST MOD 30 MIN: CPT | Mod: S$GLB,,, | Performed by: STUDENT IN AN ORGANIZED HEALTH CARE EDUCATION/TRAINING PROGRAM

## 2024-11-08 PROCEDURE — 3074F SYST BP LT 130 MM HG: CPT | Mod: CPTII,S$GLB,, | Performed by: STUDENT IN AN ORGANIZED HEALTH CARE EDUCATION/TRAINING PROGRAM

## 2024-11-08 PROCEDURE — 1159F MED LIST DOCD IN RCRD: CPT | Mod: CPTII,S$GLB,, | Performed by: STUDENT IN AN ORGANIZED HEALTH CARE EDUCATION/TRAINING PROGRAM

## 2024-11-08 PROCEDURE — 3288F FALL RISK ASSESSMENT DOCD: CPT | Mod: CPTII,S$GLB,, | Performed by: STUDENT IN AN ORGANIZED HEALTH CARE EDUCATION/TRAINING PROGRAM

## 2024-11-08 PROCEDURE — 93296 REM INTERROG EVL PM/IDS: CPT

## 2024-11-08 PROCEDURE — 1126F AMNT PAIN NOTED NONE PRSNT: CPT | Mod: CPTII,S$GLB,, | Performed by: STUDENT IN AN ORGANIZED HEALTH CARE EDUCATION/TRAINING PROGRAM

## 2024-11-08 PROCEDURE — 93010 ELECTROCARDIOGRAM REPORT: CPT | Mod: S$GLB,,, | Performed by: INTERNAL MEDICINE

## 2024-11-08 PROCEDURE — 3078F DIAST BP <80 MM HG: CPT | Mod: CPTII,S$GLB,, | Performed by: STUDENT IN AN ORGANIZED HEALTH CARE EDUCATION/TRAINING PROGRAM

## 2024-11-08 PROCEDURE — 3066F NEPHROPATHY DOC TX: CPT | Mod: CPTII,S$GLB,, | Performed by: STUDENT IN AN ORGANIZED HEALTH CARE EDUCATION/TRAINING PROGRAM

## 2024-11-08 PROCEDURE — 3008F BODY MASS INDEX DOCD: CPT | Mod: CPTII,S$GLB,, | Performed by: STUDENT IN AN ORGANIZED HEALTH CARE EDUCATION/TRAINING PROGRAM

## 2024-11-08 PROCEDURE — 1157F ADVNC CARE PLAN IN RCRD: CPT | Mod: CPTII,S$GLB,, | Performed by: STUDENT IN AN ORGANIZED HEALTH CARE EDUCATION/TRAINING PROGRAM

## 2024-11-08 PROCEDURE — 99999 PR PBB SHADOW E&M-EST. PATIENT-LVL IV: CPT | Mod: PBBFAC,,, | Performed by: STUDENT IN AN ORGANIZED HEALTH CARE EDUCATION/TRAINING PROGRAM

## 2024-11-08 PROCEDURE — 1101F PT FALLS ASSESS-DOCD LE1/YR: CPT | Mod: CPTII,S$GLB,, | Performed by: STUDENT IN AN ORGANIZED HEALTH CARE EDUCATION/TRAINING PROGRAM

## 2024-11-08 NOTE — PROGRESS NOTES
Electrophysiology Clinic Note    Reason for follow-up patient visit: Ongoing evaluation and routine device surveillance of a Medtronic Micra AV leadless pacemaker, implanted on 1/16/2024 in the setting of tachycardia-bradycardia syndrome with persistent atrial fibrillation and periods of symptomatic bradycardia with prolonged pauses.     PRESENTING HISTORY:     History of Present Illness:  Ms. Lily Green is a disha 74-year-old woman who returns to clinic today for ongoing evaluation and routine device surveillance of a Medtronic Micra AV leadless pacemaker, implanted on 1/16/2024 in the setting of tachycardia-bradycardia syndrome with persistent atrial fibrillation and periods of symptomatic bradycardia with prolonged pauses. She has a past medical history significant for tachycardia-bradycardia syndrome with persistent atrial fibrillation and periods of symptomatic bradycardia, a history of nonischemic cardiomyopathy with her most recent LVEF 55-60%, hypertension, hyperlipidemia, type II diabetes mellitus, celiac disease, a right middle lobe carcinoid tumor s/p robotic-assisted converted to right thoracotomy with therapeutic wedge resection and mediastinal lymph node dissection and repair of bleeding right inferior pulmonary vein on 1/2/2024, end-stage renal disease maintained on hemodialysis via a left AV fistula, peptic ulcer disease, a history of peritonitis due to colonic ischemia s/p a total colectomy and end ileostomy creation on 1/14/2023, evacuation of an intraabdominal hematoma and takedown of left lateral ileostomy and recreation of right lateral end ileostomy on 1/16/2023, elective robotic ileostomy reversal and ileocolonic anastomosis on 8/7/2023, GERD, anxiety, depression, hypothyroidism, and low BMI.     She was previously admitted from 1/2/2024 - 2/6/2024 in order to undergo a flexible bronchoscopy with robotic-assisted surgery that was converted to a right thoracotomy with a wedge  resection of her carcinoid tumor, with open mediastinal lymph node dissection with Pippa Thomson and Reva. She was noted to have difficult to control postoperative atrial fibrillation with RVR and was initiated on amiodarone, beta-blockade, and digoxin. Unfortunately, she was noted to have several bradycardic events with asystole, requiring placement of a temporary pacing wire. 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 We discussed the indication for undergoing implantation of a Medtronic Micra AV leadless pacemaker. This procedure was successfully performed on 1/16/2024. She was readmitted on 2/12/2024 from her skilled nursing facility with increased work of breathing, dyspnea, and hypoxia with oxygen saturation of 70% on 2 liters/minute of supplemental oxygen. She underwent emergency dialysis with improved volume status. A CXR showed pleural effusions and possible multifocal pneumonia. A repeat echocardiogram revealed a new reduced systolic ejection fraction of 15-20%, with development of atrial fibrillation with RVR on 2/13/2024. She was started on an amiodarone infusion, in addition to continued oral anticoagulation with apixaban. She underwent slow low-efficiency dialysis (SLED). A CT revealed fluid collections in the right paraesophageal space and azygoesophageal recess, with recommendations for Interventional Radiology for drainage; however, there was no safe window for approach. Interventional Cardiology initially deferred angiography due to her respiratory failure. A thoracentesis was performed on 2/15/2024 removing 1.45 liters, which was sent for analysis. She subsequently developed re-expansion pulmonary edema and was started on Airvo high flow oxygen; this was able to be successfully weaned off. Interventional Cardiology was  reconsulted, and a coronary angiogram revealed luminal irregularities. She was subsequently seen with the Advanced Heart Failure team for management of her nonischemic cardiomyopathy. Interventional Radiology performed a second thoracentesis with removal of 725 mL of pleural fluid on 2/27/2024. A chest CTA showed a new left lower lobe pneumonia, and she was prescribed a course of doxycycline and cefpodoxime. She was subsequently discharged to her skilled nursing facility on 3/1/2024. She was briefly readmitted from 3/26/2024 - 3/29/2024 with hypokalemia, with no subsequent admissions since this time.      In discussion with Ms. Green today, she tells me that she is feeling overall quite well. She presents with her  in a wheelchair. She denies any episodes of dizziness, lightheadedness, syncope, or presyncope, but occasionally reports transient dizziness with abrupt positional changes. She denies any palpitations, chest pain or chest discomfort, nausea or vomiting, orthopnea, or PND. She reports baseline shortness of breath and dyspnea with exertion that she feels has remained stable, with a chronic cough. She uses her rolling walker for ambulation when she is at home. She reports that at her dialysis sessions she occasionally has periods of hypotension.      Review of Systems:  Review of Systems   Constitutional:  Negative for activity change.   HENT:  Negative for nasal congestion, nosebleeds, postnasal drip, rhinorrhea, sinus pressure/congestion, sneezing and sore throat.    Respiratory:  Positive for shortness of breath. Negative for apnea, cough, chest tightness and wheezing.    Cardiovascular:  Positive for leg swelling. Negative for chest pain and palpitations.   Gastrointestinal:  Positive for reflux. Negative for abdominal distention, abdominal pain, blood in stool, change in bowel habit, constipation, diarrhea, nausea and vomiting.   Genitourinary:  Negative for dysuria and hematuria.    Musculoskeletal:  Positive for arthralgias, back pain and gait problem.   Neurological:  Positive for dizziness and light-headedness. Negative for seizures, syncope, weakness, headaches and coordination difficulties.        PAST HISTORY:     Past Medical History:   Diagnosis Date    Anxiety     Celiac disease 2018    Celiac disease     Depression     Diabetes mellitus     Family history of breast cancer in mother      at age 68    Hyperlipidemia     Hypertension     Meniere disease     Meniere's disease, unspecified ear     Menopause     On supplemental oxygen by nasal cannula 2023    Hypoxic respiratory failure resolved.  No oxygen desaturation on walk.        Peptic ulcer     Peritonitis 2023    Pleural effusion on left 01/15/2024    C/w transudate in setting of acute decompensated heart failure due to a-fib and volume overload.  Now appears euvolemic in clinic today, resolved.       Reflux esophagitis     Urinary tract infection     Vaginal infection     Vaginal Pap smear 2014    Pap/Hpv Negative        Past Surgical History:   Procedure Laterality Date    APPENDECTOMY      BREAST SURGERY      Tags    CARPAL TUNNEL RELEASE Bilateral 2017    ,     CLOSURE, COLOSTOMY Left 2023    Procedure: CLOSURE, COLOSTOMY;  Surgeon: Manuel Javier MD;  Location: [facility] OR;  Service: General;  Laterality: Left;    COLONOSCOPY  2018    Normal  (Cornell Velazco)     COLOSTOMY Right 2023    Procedure: CREATION, COLOSTOMY;  Surgeon: Manuel Javier MD;  Location: [facility] OR;  Service: General;  Laterality: Right;    CORONARY ANGIOGRAPHY N/A 2024    Procedure: ANGIOGRAM, CORONARY ARTERY;  Surgeon: Todd Carlisle MD;  Location: [facility] CATH LAB;  Service: Cardiology;  Laterality: N/A;    DILATION AND CURETTAGE OF UTERUS  1986    DUPUYTREN CONTRACTURE RELEASE Bilateral 2017    ESOPHAGOGASTRODUODENOSCOPY N/A 2024    Procedure: EGD (ESOPHAGOGASTRODUODENOSCOPY);   Surgeon: Yamilet Walters MD;  Location: [facility] ENDO (2ND FLR);  Service: Gastroenterology;  Laterality: N/A;    ESOPHAGOGASTRODUODENOSCOPY N/A 01/26/2024    Procedure: EGD (ESOPHAGOGASTRODUODENOSCOPY);  Surgeon: Yamilet Walters MD;  Location: [facility] ENDO (2ND FLR);  Service: Gastroenterology;  Laterality: N/A;    HYSTERECTOMY  1987    BEAU w/ appy, no BSO     IMPLANTATION OF LEADLESS PACEMAKER N/A 01/16/2024    Procedure: INSERTION, CARDIAC PACEMAKER, LEADLESS;  Surgeon: LENIN Frances MD;  Location: [facility] EP LAB;  Service: Cardiology;  Laterality: N/A;  SB, LEADLESS PPM (MICRA), MDT, ANES, EH, CVICU [id number]    INJECTION OF NEUROLYTIC AGENT AROUND LUMBAR SYMPATHETIC NERVE N/A 01/06/2022    Procedure: BLOCK, LUMBAR SYMPATHETIC;  Surgeon: Souleymane Rizo Jr., MD;  Location: [facility] PAIN MGT;  Service: Pain Management;  Laterality: N/A;  no pacemaker   pt is diabetic     INJECTION OF NEUROLYTIC AGENT AROUND LUMBAR SYMPATHETIC NERVE N/A 08/25/2022    Procedure: BLOCK, LUMBAR SYMPATHETIC;  Surgeon: Souleymane Rizo Jr., MD;  Location: [facility] PAIN MGT;  Service: Pain Management;  Laterality: N/A;  diabetic     INSERTION OF TUNNELED CENTRAL VENOUS HEMODIALYSIS CATHETER Right 01/25/2023    Procedure: INSERTION, CATHETER, HEMODIALYSIS, DUAL LUMEN;  Surgeon: Manuel Javier MD;  Location: [facility] OR;  Service: General;  Laterality: Right;    INSERTION, CATHETER, TUNNELED Left 01/28/2023    Procedure: Insertion,catheter,tunneled;  Surgeon: Watson Fontenot MD;  Location: [facility] OR;  Service: General;  Laterality: Left;    LAPAROTOMY, EXPLORATORY N/A 01/14/2023    Procedure: LAPAROTOMY, EXPLORATORY;  Surgeon: Manuel Javier MD;  Location: [facility] OR;  Service: General;  Laterality: N/A;    LAPAROTOMY, EXPLORATORY N/A 01/16/2023    Procedure: LAPAROTOMY, EXPLORATORY;  Surgeon: Manuel Javier MD;  Location: [facility] OR;  Service: General;  Laterality: N/A;    LEFT HEART CATHETERIZATION Left 02/21/2024    Procedure: Left heart cath;   Surgeon: Todd Carlisle MD;  Location: [facility] CATH LAB;  Service: Cardiology;  Laterality: Left;    LYMPHADENECTOMY Right 01/02/2024    Procedure: LYMPHADENECTOMY;  Surgeon: Tan Thomson MD;  Location: [facility] OR 2ND FLR;  Service: Cardiothoracic;  Laterality: Right;    PACEMAKER, TEMPORARY, TRANSVENOUS, PEDIATRIC Right 01/12/2024    Procedure: Pacemaker, Temporary, Transvenous;  Surgeon: Todd Carlisle MD;  Location: [facility] CATH LAB;  Service: Cardiology;  Laterality: Right;    REMOVAL OF CATHETER Right 01/28/2023    Procedure: REMOVAL, CATHETER;  Surgeon: Watson Fontenot MD;  Location: [facility] OR;  Service: General;  Laterality: Right;    REMOVAL, TUNNELED CATH Right 01/25/2023    Procedure: REMOVAL, TUNNELED CATH;  Surgeon: Manuel Javier MD;  Location: [facility] OR;  Service: General;  Laterality: Right;    ROBOTIC BRONCHOSCOPY N/A 10/20/2023    Procedure: ROBOTIC BRONCHOSCOPY;  Surgeon: Mayda Monzon MD;  [facility];  Service: Pulmonary;  Laterality: N/A;    SHOULDER SURGERY  2009 & 2010    right rotator cuff    THORACOTOMY Right 01/02/2024    Procedure: THORACOTOMY;  Surgeon: Tan Thomson MD;  [facility];  Service: Cardiothoracic;  Laterality: Right;    THORACOTOMY, WITH INITIAL THERAPEUTIC WEDGE RESECTION OF LUNG Right 01/02/2024    Procedure: THORACOTOMY, WITH INITIAL THERAPEUTIC WEDGE RESECTION OF LUNG;  Surgeon: Tan Thomson MD;  [facility];  Service: Cardiothoracic;  Laterality: Right;    TONSILLECTOMY      UPPER GASTROINTESTINAL ENDOSCOPY  04/2018       Family History:  Family History   Problem Relation Name Age of Onset    Vision loss Father Shin Cervantes Jr.     Arthritis Father Shin Cervantes Jr.     Breast cancer Mother Megan Cervantes     Cancer Mother Megan Cervantes     Vision loss Mother Megan Cervantes     Hyperlipidemia Sister Sweetie Saha     Breast cancer Paternal Aunt Ibis Durbin     Cancer Paternal Aunt Ibis Peñateddysalomón      Diabetes Paternal Grandfather Shin Bordenman, Sr.     Vision loss Sister Noreen Saha     Kidney disease Neg Hx         Social History:  She  reports that she has never smoked. She has never used smokeless tobacco. She reports that she does not currently use alcohol. She reports that she does not use drugs.      MEDICATIONS & ALLERGIES:     Review of patient's allergies indicates:   Allergen Reactions    Crestor [rosuvastatin] Swelling    Gluten protein        Current Outpatient Medications on File Prior to Visit   Medication Sig Dispense Refill    amiodarone (PACERONE) 200 MG Tab Take 1 tablet (200 mg total) by mouth once daily. 180 tablet 3    aspirin (ECOTRIN) 81 MG EC tablet Take 1 tablet (81 mg total) by mouth once daily. 90 tablet 3    atorvastatin (LIPITOR) 40 MG tablet Take 1 tablet (40 mg total) by mouth every evening. 90 tablet 3    azelastine (ASTELIN) 137 mcg (0.1 %) nasal spray 2 sprays (274 mcg total) by Nasal route 2 (two) times daily. 30 mL 6    blood sugar diagnostic (TRUE METRIX GLUCOSE TEST STRIP) Strp USE ONE STRIP FOR TESTING 2 TIMES A  each 3    blood-glucose meter kit Use to test twice a day 1 each 0    carvediloL (COREG) 6.25 MG tablet Take 1 tablet (6.25 mg total) by mouth 2 (two) times daily with meals. 180 tablet 3    dicyclomine (BENTYL) 10 MG capsule Take 10 mg by mouth 3 (three) times daily.      insulin aspart U-100 (NOVOLOG) 100 unit/mL (3 mL) InPn pen Inject before meals with scale:  201-250=+2, 251-300=+3; 301-350=+4, over 350=+5 units. Max daily dose 10 units 15 mL 0    lancets Misc 1 lancet by Misc.(Non-Drug; Combo Route) route 4 (four) times daily. 400 each 3    levothyroxine (SYNTHROID) 112 MCG tablet Take 1 tablet (112 mcg total) by mouth before breakfast. 90 tablet 3    linaGLIPtin (TRADJENTA) 5 mg Tab tablet Take 1 tablet (5 mg total) by mouth once daily. 90 tablet 1    loperamide (IMODIUM) 2 mg capsule Take 2 mg by mouth once as needed for Diarrhea.      meclizine  "(ANTIVERT) 25 mg tablet Take 1 tablet (25 mg total) by mouth 3 (three) times daily as needed for Dizziness. 90 tablet 3    midodrine (PROAMATINE) 10 MG tablet Take 1 tablet (10 mg total) by mouth every 8 (eight) hours. Can also take an extra dose with dialysis 120 tablet 3    ondansetron (ZOFRAN) 4 MG tablet Take 4 mg by mouth every 4 (four) hours as needed for Nausea.      pantoprazole (PROTONIX) 40 MG tablet Take 1 tablet (40 mg total) by mouth 2 (two) times daily before meals. 180 tablet 3    pen needle, diabetic (BD ULTRA-FINE MARIS PEN NEEDLE) 32 gauge x 5/32" Ndle 1 Device by Misc.(Non-Drug; Combo Route) route 2 (two) times a day. 200 each 3    pregabalin (LYRICA) 75 MG capsule Take 1 capsule (75 mg total) by mouth every other day. Give after HD 45 capsule 2    MELLISSA-BENJY RX 1- mg-mg-mcg Tab Take 1 tablet by mouth.      sevelamer carbonate (RENVELA) 800 mg Tab Take 1 tablet (800 mg total) by mouth 3 (three) times daily with meals. . 270 tablet 4    torsemide (DEMADEX) 20 MG Tab Take 1 tablet (20 mg total) by mouth 2 (two) times daily. 180 tablet 3    valsartan (DIOVAN) 40 MG tablet Take 0.5 tablets (20 mg total) by mouth 2 (two) times daily. 30 tablet 11    venlafaxine (EFFEXOR-XR) 37.5 MG 24 hr capsule Take 1 capsule (37.5 mg total) by mouth once daily. 90 capsule 3     No current facility-administered medications on file prior to visit.        OBJECTIVE:     Vital Signs:  BP (!) 102/54   Pulse 62   Ht 5' 5" (1.651 m)   Wt 55.1 kg (121 lb 7.6 oz)   LMP  (LMP Unknown) Comment: BEAU/ NO BSO  1986  BMI 20.21 kg/m²     Physical Exam:  Physical Exam  Constitutional:       General: She is not in acute distress.     Appearance: Normal appearance. She is not ill-appearing or diaphoretic.      Comments: Well-appearing, elderly, thin woman in NAD, presenting in a wheelchair.    HENT:      Head: Normocephalic and atraumatic.      Nose: Nose normal.      Mouth/Throat:      Mouth: Mucous membranes are moist.      " Pharynx: Oropharynx is clear.   Eyes:      Pupils: Pupils are equal, round, and reactive to light.   Cardiovascular:      Rate and Rhythm: Normal rate and regular rhythm.      Pulses: Normal pulses.      Heart sounds: Normal heart sounds. No murmur heard.     No friction rub. No gallop.   Pulmonary:      Effort: Pulmonary effort is normal. No respiratory distress.      Breath sounds: Normal breath sounds. No wheezing, rhonchi or rales.   Chest:      Chest wall: No tenderness.   Abdominal:      General: There is no distension.      Palpations: Abdomen is soft.      Tenderness: There is no abdominal tenderness.   Musculoskeletal:         General: No swelling or tenderness.      Cervical back: Normal range of motion.      Right lower leg: Edema present.      Left lower leg: Edema present.      Comments: Left upper extremity fistula. Trace bilateral pedal edema.    Skin:     General: Skin is warm and dry.      Findings: No erythema, lesion or rash.   Neurological:      General: No focal deficit present.      Mental Status: She is alert and oriented to person, place, and time. Mental status is at baseline.      Motor: No weakness.      Gait: Gait normal.   Psychiatric:         Mood and Affect: Mood normal.         Behavior: Behavior normal.        Laboratory Data:  Lab Results   Component Value Date    WBC 9.23 08/12/2024    HGB 12.3 08/12/2024    HCT 41.5 08/12/2024    MCV 98 08/12/2024     08/12/2024     Lab Results   Component Value Date     (H) 08/12/2024     (L) 08/12/2024    K 3.5 08/12/2024    CL 94 (L) 08/12/2024    CO2 27 08/12/2024    BUN 32 (H) 08/12/2024    CREATININE 5.7 (H) 08/12/2024    CALCIUM 10.7 (H) 08/12/2024    MG 2.0 03/29/2024     Lab Results   Component Value Date    INR 1.3 (H) 01/23/2024    INR 1.1 01/22/2024    INR 1.2 01/12/2024       Pertinent Cardiac Data:  Personal interpretation of today's ECG: Normal sinus rhythm with rate of 62 bpm,  ms,  ms, QT/QTc  458/464 ms, nonspecific STT changes.     Resting 2D Transthoracic Echocardiogram - 1/14/2023:  Concentric hypertrophy and  Normal right ventricular size with normal right ventricular systolic function.  The estimated ejection fraction is 35%.  Grade I left ventricular diastolic dysfunction.  Mild aortic regurgitation.  Mild mitral regurgitation.  Mild tricuspid regurgitation.  There is no pulmonary hypertension.    Pharmacologic Nuclear Cardiac Stress Test - SPECT - 11/22/2023:    Normal myocardial perfusion scan. There is no evidence of myocardial ischemia or infarction.    The LVEF is not accurate due to poor software boundary tracking at rest  and poor software boundary tracking during stress. The visually estimated ejection fraction is normal at rest and normal during stress.    There is mild global hypokinesis at rest and stress.    LV cavity size is normal at rest and normal at stress.    The ECG portion of the study is negative for ischemia.    The patient reported no chest pain during the stress test.    There were no arrhythmias during stress.    There are no prior studies for comparison.    Resting 2D Transthoracic Echocardiogram - 1/6/2024:    Left Ventricle: The left ventricle is normal in size. Normal wall thickness. Normal wall motion. There is normal systolic function with a visually estimated ejection fraction of 60 - 65%. There is normal diastolic function.    Right Ventricle: Normal right ventricular cavity size. Wall thickness is normal. Right ventricle wall motion  is normal. Systolic function is normal.    Aortic Valve: The aortic valve is a trileaflet valve. There is moderate aortic valve sclerosis. There is annular calcification present. Mildly restricted motion. There is mild aortic regurgitation.    Mitral Valve: There is mild regurgitation.    Pulmonary Artery: The estimated pulmonary artery systolic pressure is 32 mmHg.    IVC/SVC: Normal venous pressure at 3 mmHg.    Pericardium: There is  a small posterior effusion. Ascites present.    Resting 2D Transthoracic Echocardiogram - 1/12/2024:    Left Ventricle: The left ventricle is normal in size. Normal wall thickness. Normal wall motion. There is normal systolic function with a visually estimated ejection fraction of 60 - 65%. There is normal diastolic function.    Right Ventricle: Normal right ventricular cavity size. Wall thickness is normal. Right ventricle wall motion  is normal. Systolic function is normal.    Aortic Valve: The aortic valve is a trileaflet valve. There is moderate aortic valve sclerosis. There is mild annular calcification present. Mildly restricted motion. There is mild aortic regurgitation.    Mitral Valve: There is mild to moderate regurgitation.    Tricuspid Valve: The tricuspid valve is structurally normal. There is trace regurgitation.  No evidence of carcinoid disease of the tricuspid valve.    Pulmonary Artery: There is borderline elevated pulmonary hypertension. The estimated pulmonary artery systolic pressure is 34 mmHg.    IVC/SVC: Intermediate venous pressure at 8 mmHg.    Large left pleural effusion.    Pericardium: There is a moderate circumferential effusion, largest anterior to the tricuspid valve annulus. Pericardial effusion has focal strands. No indication of cardiac tamponade. Evidence includes no IVC dilation, no chamber collapse.    Resting 2D Transthoracic Echocardiogram - 2/12/2024:    Left Ventricle: The left ventricle is normal in size. Normal wall thickness. Regional wall motion abnormalities present. See diagram for wall motion findings. There is severely reduced systolic function with a visually estimated ejection fraction of 15 - 20%. Ejection fraction by visual approximation is 15%. Unable to assess diastolic function due to atrial fibrillation.    Right Ventricle: Normal right ventricular cavity size. Wall thickness is normal. Right ventricle wall motion  is normal. Systolic function is normal.     Left Atrium: Left atrium is severely dilated.    Right Atrium: Right atrium is mildly dilated.    Aortic Valve: There is moderate aortic valve sclerosis. Mildly restricted motion.    Mitral Valve: There is moderate to severe regurgitation.    Tricuspid Valve: There is mild regurgitation.    IVC/SVC: Intermediate venous pressure at 8 mmHg.    Pericardium: There is a trivial effusion. Left pleural effusion.    Resting 2D Transthoracic Echocardiogram - 2/14/2024:    Left Ventricle: The left ventricle is normal in size. Normal wall thickness. Normal wall motion. There is severely reduced systolic function with a visually estimated ejection fraction of 20 - 25%. There is diastolic dysfunction.    Right Ventricle: Normal right ventricular cavity size. Wall thickness is normal. Right ventricle wall motion has segmental dysfunction with akinesis of the mid to distal free wall. Systolic function is moderately reduced.    Left Atrium: Left atrium is moderately dilated.    Aortic Valve: Aortic valve thickening. There is moderate aortic valve sclerosis. There is annular calcification present.    IVC/SVC: Normal venous pressure at 3 mmHg.    Pericardium: Left pleural effusion.    Coronary Angiogram - 2/21/2024:    Non-obstructive CAD    Non-ischemic cardiomyopathy    The estimated blood loss was <50 mL.    Resting 2D Transthoracic Echocardiogram - 5/15/2024:    Left Ventricle: The left ventricle is normal in size. Ventricular mass is normal. Severely increased wall thickness. There is concentric remodeling. Normal wall motion. There is normal systolic function with a visually estimated ejection fraction of 55 - 60%. Ejection fraction by visual approximation is 58%. There is indeterminate diastolic function.    Right Ventricle: Normal right ventricular cavity size. Wall thickness is normal. Systolic function is normal.    Left Atrium: Left atrium is severely dilated.    Right Atrium: Right atrium is moderately dilated.    Aortic  Valve: There is moderate aortic valve sclerosis. At most slightly restricted motion. Aortic valve peak velocity is 1.68 m/s. Mean gradient is 6 mmHg. The IVANNA is low normal to slightly reduced at 2.1. There is mild aortic regurgitation.    Mitral Valve: There is mild-moderate to moderate regurgitation.    Tricuspid Valve: There is mild regurgitation.    Pulmonary Artery: The estimated pulmonary artery systolic pressure is 36 mmHg.    IVC/SVC: Normal venous pressure at 3 mmHg.    Personal interpretation of today's Device Interrogation: Personal review of her device interrogation report ([employer] Micra AV leadless pacemaker, implanted 1/16/2024) reveals adequate battery longevity with an estimated >8 years of battery life remaining. She is presently in underlying sinus bradycardia with rates of 57 bpm and is AS-. Her electrode was interrogated with acceptable and stable lead parameters. She is VS 59%,  41%. There are no concerning VHR episodes noted. She was reprogrammed today with alteration of her programming from VVI to VDD 60-120bpm with activity mode switched on.       ASSESSMENT & PLAN:   Ms. Lily Green is a disha 74-year-old woman who returns to clinic today for ongoing evaluation and routine device surveillance of a [employer] Micra AV leadless pacemaker, implanted on 1/16/2024 in the setting of tachycardia-bradycardia syndrome with persistent atrial fibrillation and periods of symptomatic bradycardia with prolonged pauses. She has a past medical history significant for tachycardia-bradycardia syndrome with persistent atrial fibrillation and periods of symptomatic bradycardia, a history of nonischemic cardiomyopathy with her most recent LVEF 55-60%, hypertension, hyperlipidemia, type II diabetes mellitus, celiac disease, a right middle lobe carcinoid tumor s/p robotic-assisted converted to right thoracotomy with therapeutic wedge resection and mediastinal lymph node dissection and repair of  bleeding right inferior pulmonary vein on 1/2/2024, end-stage renal disease maintained on hemodialysis via a left AV fistula, peptic ulcer disease, a history of peritonitis due to colonic ischemia s/p a total colectomy and end ileostomy creation on 1/14/2023, evacuation of an intraabdominal hematoma and takedown of left lateral ileostomy and recreation of right lateral end ileostomy on 1/16/2023, elective robotic ileostomy reversal and ileocolonic anastomosis on 8/7/2023, GERD, anxiety, depression, hypothyroidism, and low BMI.     - She has a normally functioning Medtronic Micra AV leadless pacemaker on device interrogation today, with underlying sinus rhythm. She remains paced from the RV, 41%, increased from her prior burden of 3.5%. Her intrinsic QRSd is narrow 102ms. She continues to have preserved systolic function, LVEF 55-60%, with continued efforts to prevent indwelling pacemaker leads in the setting of her ongoing need for hemodialysis and limited vascular access. There is no present indication for device upgrade.    - She was reprogrammed today with alteration of her programming from VVI to VDD 60-120bpm with activity mode switched on in an effort to improve her histograms.   - She will continue to send remote transmissions, and will return for her next in-office interrogation in six months for ongoing surveillance.   - We discussed her paroxysmal atrial fibrillation. She has not had any evidence of recurrent atrial fibrillation on serial ECGs since 2/12/2024, with no events recorded on her pacemaker interrogation today. She was previously maintained on apixaban for oral anticoagulation; however, this was discontinued at a prior hospitalization in the setting of a GI bleed and need for frequent procedures with IR and hemodialysis access. Her [id number] is elevated at 6, portending an annual adjusted risk of CVA of 9.8%. We discussed that should she evidence recurrent atrial fibrillation, we will need to  discuss resumption of short-term apixaban 2.5mg po BID with consideration for implantation of a WATCHMAN left atrial appendage occlusion device given her history of GI bleeding.     - She remains on amiodarone 200mg po daily and carvedilol 6.25mg po BID with no evidence of recurrent atrial fibrillation.   - She will continue to follow with her PCP, hematology/oncology team, and general cardiologist for ongoing management of her comorbid medical conditions. Her blood pressure remains at goal on her current medication regimen.       This patient will return to clinic with me in six months with continued remote transmissions in the interim. All questions and concerns were addressed at this encounter. Total time spent in this patient encounter: 35 minutes.     Signing Physician:       MELQUIADES Frances MD  Electrophysiology Attending

## 2024-11-11 ENCOUNTER — TELEPHONE (OUTPATIENT)
Dept: ELECTROPHYSIOLOGY | Facility: CLINIC | Age: 74
End: 2024-11-11
Payer: MEDICARE

## 2024-11-11 NOTE — TELEPHONE ENCOUNTER
Spoke with patient regarding pacemaker home monitor not connecting. Patient states she sent transmission from her monitor but the clinic did not receive this transmission. Provided [employer] Stay Connected telephone number and instructed patient to call and have monitor assessed. Pt was agreeable and thanked me for the call.

## 2024-11-13 DIAGNOSIS — Z90.2 HISTORY OF LOBECTOMY OF LUNG: ICD-10-CM

## 2024-11-13 DIAGNOSIS — Z85.118 HISTORY OF LUNG CANCER: Primary | ICD-10-CM

## 2024-11-14 ENCOUNTER — PATIENT MESSAGE (OUTPATIENT)
Dept: ADMINISTRATIVE | Facility: HOSPITAL | Age: 74
End: 2024-11-14
Payer: MEDICARE

## 2024-11-14 ENCOUNTER — TELEPHONE (OUTPATIENT)
Dept: PULMONOLOGY | Facility: CLINIC | Age: 74
End: 2024-11-14
Payer: MEDICARE

## 2024-11-14 NOTE — TELEPHONE ENCOUNTER
Patient scheduled for follow up with Dr Monzon on 12/12/24 at 11:30 with ct scan prior at 10:15am at imaging center. Appointment mailed. Patient notified as well.

## 2024-11-15 LAB — OHS CV DC REMOTE DEVICE TYPE: NORMAL

## 2024-11-19 DIAGNOSIS — R42 DIZZINESS: ICD-10-CM

## 2024-11-19 DIAGNOSIS — K21.9 GASTROESOPHAGEAL REFLUX DISEASE WITHOUT ESOPHAGITIS: ICD-10-CM

## 2024-11-19 RX ORDER — PREGABALIN 75 MG/1
75 CAPSULE ORAL EVERY OTHER DAY
Qty: 45 CAPSULE | Refills: 3 | Status: SHIPPED | OUTPATIENT
Start: 2024-11-19

## 2024-11-19 NOTE — TELEPHONE ENCOUNTER
Care Due:                  Date            Visit Type   Department     Provider  --------------------------------------------------------------------------------                                EP -                              PRIMARY      [facility]  Last Visit: 08-      CARE ([facility])   CARE           Pavel Deleon                              EP -                              PRIMARY      [facility] PR[facility]  Next Visit: 02-      CARE ([facility])   CARE           Pavel Deleon                                                            Last  Test          Frequency    Reason                     Performed    Due Date  --------------------------------------------------------------------------------    Lipid Panel.  12 months..  atorvastatin.............  02-   02-    [facility] Embedded Care Due Messages. Reference number: [id number].   11/19/2024 8:27:06 AM CST

## 2024-11-21 ENCOUNTER — TELEPHONE (OUTPATIENT)
Dept: PULMONOLOGY | Facility: CLINIC | Age: 74
End: 2024-11-21
Payer: MEDICARE

## 2024-11-21 NOTE — TELEPHONE ENCOUNTER
----- Message from Maria Isabel sent at 11/21/2024  1:20 PM CST -----  Contact: [phone]  [email or web address]     Patient is calling for Medical Advice regarding: Patient wants to notify the reason she is rescheduling her appt is because patient has dialysis  on Monday.     How long has patient had these symptoms:    Pharmacy name and phone#:    Patient wants a call back or thru [facility]:    Comments:    Please advise patient replies from provider may take up to 48 hours.

## 2024-11-21 NOTE — TELEPHONE ENCOUNTER
I spoke with patient to discuss the days she goes to dialysis. I let her know I will send a message to Dr Monzno for review and once advised I will follow up. Patient verbalized understanding.       Patient normally goes on Tues, Thurs and Sat. Patient was scheduled to see Dr Monzon on Monday, 11/25/24 but needed to reschedule due to dialysis calling to change her days due to Thanksgiving holidays. Next week she will need to go to dialysis on Mon & Wed which are the days Dr Monzon has clinic. Patient stated that is why she keeps needing to change and/or reschedule her appointments with Dr Monzon due to dialysis. She is aware she needs a ct scan prior to seeing Dr Monzon. Patient verbalized understanding.

## 2024-11-25 ENCOUNTER — PATIENT MESSAGE (OUTPATIENT)
Dept: ELECTROPHYSIOLOGY | Facility: CLINIC | Age: 74
End: 2024-11-25
Payer: MEDICARE

## 2024-12-09 NOTE — PROGRESS NOTES
Advanced Heart Failure and Transplantation Clinic Follow up.      Attending Physician: Deion Thibodeaux MD.  The patient's last visit with me was on 3/26/2024.         HPI.  Lily Green is a 73 year old white woman with celiac disease, diabetes mellitus type 2 with polyneuropathy, hypertension, hyperlipidemia, history of peptic ulcer disease, depression, anxiety, history of hysterectomy in 1987, history of peritonitis due to diffuse colonic ischemia status post total colectomy and end ileostomy creation on 1/14/2023, evacuation of intraabdominal hematoma, takedown of left lateral ileostomy and recreation of right lateral end ileostomy on 1/16/2023, elective robotic ileostomy reversal and ileocolonic anastomosis on 8/7/2023, irritable bowel syndrome, end stage renal disease status post right internal jugular permanent dialysis catheter on 1/25/2023 removed on 1/28/2023 and replaced with left internal jugular permanent dialysis catheter (on hemodialysis Tuesday Thursday Saturday), right middle lobe carcinoid tumor status post robotic-assisted converted to right thoracotomy with therapeutic wedge resection and mediastinal lymph node dissection and repair of bleeding right inferior pulmonary vein on 1/2/2024, atrial fibrillation, Medtronic Micra AV leadless cardiac pacemaker placement on 1/16/2024. She lives in [place], [place]. She is . Her primary care physician is Dr. Pavel Calixto.               She was hospitalized at [facility] from 1/2/2024 to 2/6/2024 for right middle lobe carcinoid tumor, parapneumonic effusion, atrial fibrillation with rapid ventricular response, tachy-eulogio syndrome, gastrointestinal bleeding while on heparin. She was discharged to [facility] skilled nursing facility. She was prescribed cefpodoxime and metronidazole for 13 days, fluconazole for 7    Caller: Huyen Interiano    Relationship to patient: Self    Best call back number: [phone] (home)       Patient is needing: PT WANTED TO SEE IF SHE COULD SCHEDULE HER ANATOMY SCAN AT HER NEXT VISIT ON 12/1. SHE STATES SHE DOES NOT HAVE A ''CONSISTENT'' RIDE TO HER APPTS. OK TO LEAVE HER A DETAILED VM.     days, metoprolol tartrate, and venlafaxine.               She presented to [facility] Emergency Department on 2/12/2024 from the skilled nursing facility with increased work of breathing, dyspnea, and hypoxia with oxygen saturation of 70% on 2 liters/minute of supplemental oxygen. It occurred acutely on the day of presentation. Emergency medical services put her on CPAP with some improvement. She was put on BiPAP in the emergency department. She had tachycardia and tachypnea. Her last dialysis session was 2 days prior on Saturday. She reported cough and nausea. Labs showed BNP 2754 pg/mL, troponin 0.312 ng/mL. Venous blood gas showed pH 7.438, pCO2 38.9. EKG showed sinus tachycardia. Chest X-ray showed pleural effusions and possible multifocal pneumonia. Nephrology was consulted for emergent dialysis with goal of removing 3 liters. She was admitted to Critical Care Medicine.     Echocardiogram revealed new low left ventricular ejection fraction. Troponins remained elevated. Cardiology was consulted. She developed atrial fibrillation with rapid ventricular response on 2/13/2024. She was started on amiodarone infusion. She underwent slow low-efficiency dialysis (SLED). CT showed fluid collections in the right paraesophageal space and azygoesophageal recess. Cardiothoracic Surgery was consulted and recommended Interventional Radiology consult for drainage, but there was no safe window for approach. Interventional Cardiology deferred angiography due to respiratory failure. Palliative Care was consulted. Dialysis removed further fluid. Metoprolol was increased. Thoracentesis was performed on 2/15/2024 removing 1.45 liters, which was sent for analysis. She developed re-expansion pulmonary edema and was started on Airvo high flow oxygen. Airvo was weaned down to 50 liters/minute and FiO2 40% on 2/16/2024. Pleural effusion was transudative. She was started on vasopressor support. She was weaned to  high flow nasal cannula at 12 liters/minute on 2/17/2024. She tolerated SLED without norepinephrine. She was weaned to 4 liters/minute on 2/18/2024. She was transferred to Hospital Medicine Team W on 2/20/2024. She was started on nifedipine. She worked with Physical and Occupational Therapy. Interventional Cardiology was reconsulted. Left heart catheterization showed luminal irregularities. Interventional Cardiology recommended starting aspirin 81 mg daily and atorvastatin 10 mg daily and consulting Heart Transplant Service (HTS) for management of nonischemic cardiomyopathy. She was already on atorvastatin 40 mg daily. Palliative Care signed off and planned to see her outpatient. She continued to require 4 liters/minute of supplemental oxygen. She was still short of breath. HTS was consulted and they said General Cardiology could be consulted. Cardiology recommended stopping nifedipine, starting valsartan, and resuming her metoprolol. Nasal cannula was weaned to 2 liters/minute on 2/25/2024 but she started coughing. Repeat chest X-ray on 2/26/2024 paradoxically showed slight increase in left pleural effusion, as well as persistent mild pulmonary vascular congestion. Interventional Radiology was consulted for thoracentesis. They removed 725 mL of pleural fluid on 2/27/2024. Fluid was sent for cultures. She continued to feel short of breath with tachypnea on 2/28/2024. Net fluid status since admission was negative 13.8 liters at this point. Hypoxia resolved but she was coughing more. Chest CTA showed a new left lower lobe pneumonia. She was put on a course of doxycycline. Pharmacy recommended adding cefpodoxime. She was discharged to the skilled nursing facility on 3/1/2024.      March 26, 2024: patient has dyspnea on exertion, dyspnea with minimal activities. She came in a wheelchair with her . She was on rehab and said she was able to walk 150 steps.  She prefers sleeping elevated. She complained of leg  edema. She is on HD -W-.      May 13, 2024:  patient complains of dyspnea on exertion with any activities, leg edema, cough that worsens on recumbence, abdominal distention.      Review of Systems   Constitutional:  Positive for activity change and fatigue. Negative for chills, diaphoresis and fever.   HENT:  Negative for nasal congestion, rhinorrhea and sore throat.    Eyes:  Negative for visual disturbance.   Respiratory:  Positive for cough and shortness of breath.    Cardiovascular:  Positive for leg swelling. Negative for chest pain.   Gastrointestinal:  Positive for abdominal distention. Negative for abdominal pain, diarrhea, nausea and vomiting.   Genitourinary:  Negative for difficulty urinating, dysuria and hematuria.   Integumentary:  Negative for rash.   Neurological:  Negative for seizures, syncope and light-headedness.   Psychiatric/Behavioral:  Negative for agitation and hallucinations.         Past Medical History:   Diagnosis Date    Anxiety     Celiac disease 2018    Celiac disease     Depression     Diabetes mellitus     Family history of breast cancer in mother      at age 68    Hyperlipidemia     Hypertension     Meniere disease     Meniere's disease, unspecified ear     Menopause     Peptic ulcer     Peritonitis 2023    Reflux esophagitis     Urinary tract infection     Vaginal infection     Vaginal Pap smear 2014    Pap/Hpv Negative         Past Surgical History:   Procedure Laterality Date    APPENDECTOMY      BREAST SURGERY      Tags    CARPAL TUNNEL RELEASE Bilateral 2017    ,     CLOSURE, COLOSTOMY Left 2023    Procedure: CLOSURE, COLOSTOMY;  Surgeon: Manuel Javier MD;  Location: [facility] OR;  Service: General;  Laterality: Left;    COLONOSCOPY  2018    Normal  (Cornell Lizarragaald)     COLOSTOMY Right 2023    Procedure: CREATION, COLOSTOMY;  Surgeon: Manuel Javier MD;  Location: [facility] OR;  Service: General;  Laterality: Right;    CORONARY  ANGIOGRAPHY N/A 2/21/2024    Procedure: ANGIOGRAM, CORONARY ARTERY;  Surgeon: Todd Carlisel MD;  Location: [facility] CATH LAB;  Service: Cardiology;  Laterality: N/A;    DILATION AND CURETTAGE OF UTERUS  1986    DUPUYTREN CONTRACTURE RELEASE Bilateral 09/2017    ESOPHAGOGASTRODUODENOSCOPY N/A 1/24/2024    Procedure: EGD (ESOPHAGOGASTRODUODENOSCOPY);  Surgeon: Yamilet Walters MD;  Location: [facility] ENDO (2ND FLR);  Service: Gastroenterology;  Laterality: N/A;    ESOPHAGOGASTRODUODENOSCOPY N/A 1/26/2024    Procedure: EGD (ESOPHAGOGASTRODUODENOSCOPY);  Surgeon: Yamilet Walters MD;  Location: [facility] ENDO (2ND FLR);  Service: Gastroenterology;  Laterality: N/A;    HYSTERECTOMY  1987    BEAU w/ appy, no BSO     IMPLANTATION OF LEADLESS PACEMAKER N/A 1/16/2024    Procedure: INSERTION, CARDIAC PACEMAKER, LEADLESS;  Surgeon: LENIN Frances MD;  Location: [facility] EP LAB;  Service: Cardiology;  Laterality: N/A;  SB, LEADLESS PPM (MICRA), MDT, ANES, EH, CVICU [id number]    INJECTION OF NEUROLYTIC AGENT AROUND LUMBAR SYMPATHETIC NERVE N/A 1/6/2022    Procedure: BLOCK, LUMBAR SYMPATHETIC;  Surgeon: Souleymane Rizo Jr., MD;  Location: [facility] PAIN MGT;  Service: Pain Management;  Laterality: N/A;  no pacemaker   pt is diabetic     INJECTION OF NEUROLYTIC AGENT AROUND LUMBAR SYMPATHETIC NERVE N/A 8/25/2022    Procedure: BLOCK, LUMBAR SYMPATHETIC;  Surgeon: Souleymane Rizo Jr., MD;  Location: [facility] PAIN MGT;  Service: Pain Management;  Laterality: N/A;  diabetic     INSERTION OF TUNNELED CENTRAL VENOUS HEMODIALYSIS CATHETER Right 1/25/2023    Procedure: INSERTION, CATHETER, HEMODIALYSIS, DUAL LUMEN;  Surgeon: Manuel Javier MD;  Location: [facility] OR;  Service: General;  Laterality: Right;    INSERTION, CATHETER, TUNNELED Left 1/28/2023    Procedure: Insertion,catheter,tunneled;  Surgeon: Watson Fontenot MD;  Location: [facility] OR;  Service: General;  Laterality: Left;    LAPAROTOMY, EXPLORATORY N/A 1/14/2023    Procedure: LAPAROTOMY,  EXPLORATORY;  Surgeon: Manuel Javier MD;  Location: [facility] OR;  Service: General;  Laterality: N/A;    LAPAROTOMY, EXPLORATORY N/A 1/16/2023    Procedure: LAPAROTOMY, EXPLORATORY;  Surgeon: Manuel Javier MD;  Location: [facility] OR;  Service: General;  Laterality: N/A;    LEFT HEART CATHETERIZATION Left 2/21/2024    Procedure: Left heart cath;  Surgeon: Todd Carlisle MD;  Location: [facility] CATH LAB;  Service: Cardiology;  Laterality: Left;    LYMPHADENECTOMY Right 1/2/2024    Procedure: LYMPHADENECTOMY;  Surgeon: Tan Thomson MD;  Location: [facility] OR [address] [place] [address];  Service: Cardiothoracic;  Laterality: Right;    PACEMAKER, TEMPORARY, TRANSVENOUS, PEDIATRIC Right 1/12/2024    Procedure: Pacemaker, Temporary, Transvenous;  Surgeon: Todd Carlisle MD;  Location: [facility] CATH LAB;  Service: Cardiology;  Laterality: Right;    REMOVAL OF CATHETER Right 1/28/2023    Procedure: REMOVAL, CATHETER;  Surgeon: Watson Fontenot MD;  Location: [facility] OR;  Service: General;  Laterality: Right;    REMOVAL, TUNNELED CATH Right 1/25/2023    Procedure: REMOVAL, TUNNELED CATH;  Surgeon: Manuel Javier MD;  Location: [facility] OR;  Service: General;  Laterality: Right;    ROBOTIC BRONCHOSCOPY N/A 10/20/2023    Procedure: ROBOTIC BRONCHOSCOPY;  Surgeon: Mayda Monzon MD;  Location: [address];  Service: Pulmonary;  Laterality: N/A;    SHOULDER SURGERY  2009 & 2010    right rotator cuff    THORACOTOMY Right 1/2/2024    Procedure: THORACOTOMY;  Surgeon: Tan Thomson MD;  Location: [address];  Service: Cardiothoracic;  Laterality: Right;    THORACOTOMY, WITH INITIAL THERAPEUTIC WEDGE RESECTION OF LUNG Right 1/2/2024    Procedure: THORACOTOMY, WITH INITIAL THERAPEUTIC WEDGE RESECTION OF LUNG;  Surgeon: Tan Thomson MD;  Location: [facility] OR [address] [place] [address];  Service: Cardiothoracic;  Laterality: Right;    TONSILLECTOMY      UPPER GASTROINTESTINAL ENDOSCOPY  04/2018        Family History   Problem Relation Name Age of Onset     Vision loss Father Shin Cervantes Jr.     Arthritis Father Shin Cervantes Jr.     Breast cancer Mother Megan Cervantes     Cancer Mother Megan Cervantes     Vision loss Mother Megan Cervantes     Hyperlipidemia Sister Sweetie Saha     Breast cancer Paternal Aunt Ibis Redditt     Cancer Paternal Aunt Ibis Peñadisalomón     Diabetes Paternal Grandfather Shin Cervantes, .     Vision loss Sister Noreen Saha     Kidney disease Neg Hx          Review of patient's allergies indicates:   Allergen Reactions    Crestor [rosuvastatin] Swelling    Gluten protein         Current Outpatient Medications   Medication Instructions    amiodarone (PACERONE) 200 mg, Oral, 2 times daily    aspirin (ECOTRIN) 81 mg, Oral, Daily    atorvastatin (LIPITOR) 40 mg, Oral, Nightly    B complex-vitamin C-folic acid (MELLISSA-BENJY) 0.8 mg Tab 0.8 mg, Oral, Daily    betahistine HCl (BETAHISTINE, BULK, MISC) No dose, route, or frequency recorded.    blood sugar diagnostic (TRUE METRIX GLUCOSE TEST STRIP) Strp USE ONE STRIP FOR TESTING 2 TIMES A DAY    blood-glucose meter kit Use to test twice a day    calcium-vitamin D3 (OS-CIPRIANO 500 + D3) 500 mg-5 mcg (200 unit) per tablet 1 tablet, Oral, Daily    carvediloL (COREG) 6.25 mg, Oral, 2 times daily with meals    dicyclomine (BENTYL) 10 mg, Oral, 3 times daily    ferrous gluconate (FERGON) 324 mg, Oral, With breakfast    insulin aspart U-100 (NOVOLOG) 100 unit/mL (3 mL) InPn pen Inject before meals:  150-200=+1, 201-250=+2, 251-300=+3; 301-350=+4, over 350=+5 units    lancets Misc 1 lancet , Misc.(Non-Drug; Combo Route), 4 times daily    levothyroxine (SYNTHROID) 112 mcg, Oral, Before breakfast    loperamide (IMODIUM) 2 mg, Oral, Once as needed    meclizine (ANTIVERT) 25 mg, Oral, 3 times daily PRN    midodrine (PROAMATINE) 10 mg, 2 times daily    multivitamin with minerals tablet 1 tablet, Oral, Daily    nutritional supplements Liqd 237 mLs, Oral, 4 times daily    ondansetron (ZOFRAN) 4 mg, Oral, Every 4  "hours PRN    pantoprazole (PROTONIX) 40 mg, Oral, 2 times daily before meals    pen needle, diabetic (BD ULTRA-FINE MARSI PEN NEEDLE) 32 gauge x 5/32" Ndle 1 Device, Misc.(Non-Drug; Combo Route), 4 times daily with meals & nightly    pregabalin (LYRICA) 75 mg, Oral, Every other day, Give after HD    torsemide (DEMADEX) 20 mg, Oral, 2 times daily    valsartan (DIOVAN) 20 mg, Oral, 2 times daily    venlafaxine (EFFEXOR) 37.5 mg, Oral, Daily    vit C,M-Nb-fzztv-lutein-zeaxan (PRESERVISION AREDS 2) 172-967-15-1 mg-unit-mg-mg Cap No dose, route, or frequency recorded.        There were no vitals filed for this visit.     Wt Readings from Last 3 Encounters:   05/11/24 52.9 kg (116 lb 9.9 oz)   04/26/24 52.9 kg (116 lb 10 oz)   03/29/24 53.7 kg (118 lb 6.2 oz)     Temp Readings from Last 3 Encounters:   05/11/24 98 °F (36.7 °C) (Oral)   03/29/24 97.8 °F (36.6 °C) (Oral)   03/01/24 98.1 °F (36.7 °C) (Oral)     BP Readings from Last 3 Encounters:   05/13/24 (!) 113/59   05/11/24 139/67   04/26/24 (!) 104/56     Pulse Readings from Last 3 Encounters:   05/13/24 (!) 59   05/11/24 63   04/26/24 96        There is no height or weight on file to calculate BMI. Estimated body surface area is 1.56 meters squared as calculated from the following:    Height as of 4/26/24: 5' 5" (1.651 m).    Weight as of 5/11/24: 52.9 kg (116 lb 9.9 oz).     Physical Exam  Constitutional:       Appearance: She is well-developed.   HENT:      Head: Normocephalic and atraumatic.      Right Ear: External ear normal.      Left Ear: External ear normal.   Eyes:      Conjunctiva/sclera: Conjunctivae normal.      Pupils: Pupils are equal, round, and reactive to light.   Neck:      Vascular: Hepatojugular reflux and JVD present.      Comments: JVP 18 cmH20  Cardiovascular:      Rate and Rhythm: Normal rate and regular rhythm.      Pulses: Intact distal pulses.      Heart sounds: S1 normal and S2 normal. No murmur heard.     No friction rub. No gallop. "   Pulmonary:      Effort: Pulmonary effort is normal.      Breath sounds: Examination of the right-middle field reveals rales. Examination of the left-middle field reveals rales. Examination of the right-lower field reveals rales. Examination of the left-lower field reveals rales. Rales present.   Abdominal:      General: Bowel sounds are normal. There is no distension.      Palpations: Abdomen is soft.      Tenderness: There is no abdominal tenderness. There is no guarding or rebound.   Musculoskeletal:      Cervical back: Normal range of motion and neck supple.      Right lower leg: 3+ Edema present.      Left lower leg: 3+ Edema present.   Neurological:      Mental Status: She is alert and oriented to person, place, and time.          Lab Results   Component Value Date    BNP 1,554 (H) 03/26/2024     05/11/2024    K 3.2 (L) 05/11/2024    MG 2.0 03/29/2024    CL 97 05/11/2024    CO2 25 05/11/2024    BUN 16 05/11/2024    CREATININE 4.0 (H) 05/11/2024     (H) 05/11/2024    HGBA1C 4.9 11/15/2023    HGBA1C 6.8 10/23/2019    AST 47 (H) 05/11/2024    ALT 35 05/11/2024    ALBUMIN 2.2 (L) 05/11/2024    PROT 8.1 05/11/2024    BILITOT 0.6 05/11/2024    WBC 12.84 (H) 05/11/2024    HGB 9.1 (L) 05/11/2024    HCT 30.9 (L) 05/11/2024    HCT 40 03/26/2024     05/11/2024    INR 1.3 (H) 01/23/2024     (H) 02/15/2024    TSH 19.949 (H) 03/26/2024    CHOL 125 02/16/2024    HDL 39 (L) 02/16/2024    HDL 53 10/23/2019    LDLCALC 60.4 (L) 02/16/2024    TRIG 128 02/16/2024    TRIG 102 10/23/2019    FREET4 0.79 03/26/2024         Results for orders placed during the hospital encounter of 02/12/24    Echo    Interpretation Summary    Left Ventricle: The left ventricle is normal in size. Normal wall thickness. Normal wall motion. There is severely reduced systolic function with a visually estimated ejection fraction of 20 - 25%. There is diastolic dysfunction.    Right Ventricle: Normal right ventricular cavity  size. Wall thickness is normal. Right ventricle wall motion has segmental dysfunction with akinesis of the mid to distal free wall. Systolic function is moderately reduced.    Left Atrium: Left atrium is moderately dilated.    Aortic Valve: Aortic valve thickening. There is moderate aortic valve sclerosis. There is annular calcification present.    IVC/SVC: Normal venous pressure at 3 mmHg.    Pericardium: Left pleural effusion.        Results for orders placed during the hospital encounter of 02/12/24    Cardiac catheterization    Conclusion    Non-obstructive CAD    Non-ischemic cardiomyopathy    The estimated blood loss was <50 mL.    The procedure log was documented by Documenter: Tori Corral and verified by Todd Carlisle MD.    Date: 2/21/2024  Time: 3:22 PM         Assessment and Plan:  Acute decompensated heart failure  -     Echo; Future; Expected date: 05/13/2024  -     CBC Auto Differential; Future; Expected date: 08/13/2024  -     Comprehensive Metabolic Panel; Future; Expected date: 08/13/2024  -     BNP; Future; Expected date: 08/13/2024    Paroxysmal atrial fibrillation    ESRD (end stage renal disease)    Type 2 diabetes mellitus with diabetic polyneuropathy, without long-term current use of insulin          Acute on chronic systolic HF, NYHA class III, stage C. LVEF 20-25%  Etiology: NICM, suspected tachycardia induced. Has atrial fibrillation and previous HFrEF that had recovered normal function in 2023. Back down to 25% in the setting of a. Fib with RVR last February 2024.  Since then she had 30 day monitor that showed no a. Fib.  Devices: leadless pacemaker.  Medications: carvedilol 6.25 mg BID, valsartan 20 mg BID.  Hemodynamic status: warm, normotensive, severely hypervolemic.  Clinical course:  low EF since 2023.   Plan:  -needs her severe hypervolemia addressed by her HD team.  -repeat echo to determine function after a. Fib has been controlled with amiodarone.    2. Atrial  fibrillation and pacemaker. On amiodarone.  Follows with EP for treatment.     3. H/o lung cancer (carcinoid) s/p resection in 2024.     4. ESRD on HD.     Patient is not a candidate for advanced therapies due to age, frailty and comorbidities.         Advance Care Planning     Date: 05/13/2024    Power of   I initiated the process of voluntary advance care planning today and explained the importance of this process to the patient.  I introduced the concept of advance directives to the patient, as well. Then the patient received detailed information about the importance of designating a Health Care Power of  (HCPOA). She was also instructed to communicate with this person about their wishes for future healthcare, should she become sick and lose decision-making capacity. The patient has previously appointed a HCPOA. After our discussion, the patient has decided to complete a HCPOA and has appointed her significant other, health care agent:  Deric Green  & health care agent number:  [phone]  I spent a total time of 20 minutes discussing this issue with the patient.

## 2024-12-18 LAB
LEFT EYE DM RETINOPATHY: POSITIVE
RIGHT EYE DM RETINOPATHY: POSITIVE

## 2024-12-23 ENCOUNTER — PATIENT OUTREACH (OUTPATIENT)
Dept: ADMINISTRATIVE | Facility: HOSPITAL | Age: 74
End: 2024-12-23
Payer: MEDICARE

## 2024-12-27 ENCOUNTER — TELEPHONE (OUTPATIENT)
Dept: PODIATRY | Facility: CLINIC | Age: 74
End: 2024-12-27
Payer: MEDICARE

## 2024-12-27 NOTE — TELEPHONE ENCOUNTER
----- Message from Leola sent at 12/27/2024  1:23 PM CST -----  Contact: Mobile  [phone]  Patient would like to reschedule her appointment that is scheduled for January six.

## 2024-12-30 RX ORDER — GLUCOSAM/CHON-MSM1/C/MANG/BOSW 500-416.6
TABLET ORAL
Qty: 200 EACH | Refills: 5 | Status: SHIPPED | OUTPATIENT
Start: 2024-12-30

## 2024-12-30 NOTE — ANESTHESIA POSTPROCEDURE EVALUATION
Anesthesia Post Evaluation    Patient: Lily Green    Procedure(s) Performed: Procedure(s) (LRB):  LYMPHADENECTOMY (Right)  THORACOTOMY (Right)  THORACOTOMY, WITH INITIAL THERAPEUTIC WEDGE RESECTION OF LUNG (Right)    Final Anesthesia Type: general      Patient location during evaluation: PACU  Patient participation: Yes- Able to Participate  Level of consciousness: awake and alert  Post-procedure vital signs: reviewed and stable  Pain management: adequate  Airway patency: patent    PONV status at discharge: No PONV  Anesthetic complications: no      Cardiovascular status: hemodynamically stable  Respiratory status: unassisted  Hydration status: euvolemic  Follow-up not needed.              Vitals Value Taken Time   /68 01/03/24 0433   Temp 36.5 °C (97.7 °F) 01/03/24 0433   Pulse 83 01/03/24 0433   Resp 20 01/03/24 0433   SpO2 96 % 01/03/24 0433         Event Time   Out of Recovery 13:30:00         Pain/Meredith Score: Pain Rating Prior to Med Admin: 5 (1/3/2024  3:14 AM)  Pain Rating Post Med Admin: 2 (1/3/2024  4:14 AM)  Meredith Score: 9 (1/2/2024  1:30 PM)          
Yes

## 2025-01-02 ENCOUNTER — CLINICAL SUPPORT (OUTPATIENT)
Dept: CARDIOLOGY | Facility: HOSPITAL | Age: 75
End: 2025-01-02
Payer: MEDICARE

## 2025-01-02 ENCOUNTER — CLINICAL SUPPORT (OUTPATIENT)
Dept: CARDIOLOGY | Facility: HOSPITAL | Age: 75
End: 2025-01-02
Attending: STUDENT IN AN ORGANIZED HEALTH CARE EDUCATION/TRAINING PROGRAM
Payer: MEDICARE

## 2025-01-02 DIAGNOSIS — R00.1 BRADYCARDIA, UNSPECIFIED: ICD-10-CM

## 2025-01-02 DIAGNOSIS — Z95.0 PRESENCE OF CARDIAC PACEMAKER: ICD-10-CM

## 2025-01-02 LAB
OHS CV DC REMOTE DEVICE TYPE: NORMAL
OHS CV RV PACING PERCENT: 14.36 %

## 2025-01-02 PROCEDURE — 93294 REM INTERROG EVL PM/LDLS PM: CPT | Mod: ,,, | Performed by: STUDENT IN AN ORGANIZED HEALTH CARE EDUCATION/TRAINING PROGRAM

## 2025-01-02 PROCEDURE — 93296 REM INTERROG EVL PM/IDS: CPT | Performed by: STUDENT IN AN ORGANIZED HEALTH CARE EDUCATION/TRAINING PROGRAM

## 2025-01-03 ENCOUNTER — HOSPITAL ENCOUNTER (OUTPATIENT)
Dept: RADIOLOGY | Facility: HOSPITAL | Age: 75
Discharge: HOME | End: 2025-01-03
Attending: INTERNAL MEDICINE
Payer: MEDICARE

## 2025-01-03 DIAGNOSIS — Z90.2 HISTORY OF LOBECTOMY OF LUNG: ICD-10-CM

## 2025-01-03 DIAGNOSIS — Z85.118 HISTORY OF LUNG CANCER: ICD-10-CM

## 2025-01-03 PROCEDURE — 71250 CT THORAX DX C-: CPT | Mod: 26,,, | Performed by: RADIOLOGY

## 2025-01-03 PROCEDURE — 71250 CT THORAX DX C-: CPT | Mod: TC

## 2025-01-06 ENCOUNTER — OFFICE VISIT (OUTPATIENT)
Dept: PULMONOLOGY | Facility: CLINIC | Age: 75
End: 2025-01-06
Payer: MEDICARE

## 2025-01-06 VITALS
DIASTOLIC BLOOD PRESSURE: 56 MMHG | OXYGEN SATURATION: 98 % | SYSTOLIC BLOOD PRESSURE: 104 MMHG | WEIGHT: 122.56 LBS | HEART RATE: 70 BPM | BODY MASS INDEX: 20.42 KG/M2 | HEIGHT: 65 IN

## 2025-01-06 DIAGNOSIS — I71.21 ANEURYSM OF ASCENDING AORTA WITHOUT RUPTURE: ICD-10-CM

## 2025-01-06 DIAGNOSIS — R05.8 UPPER AIRWAY COUGH SYNDROME: ICD-10-CM

## 2025-01-06 DIAGNOSIS — C7A.090 MALIGNANT CARCINOID TUMOR OF LUNG: ICD-10-CM

## 2025-01-06 DIAGNOSIS — J40 BRONCHITIS: ICD-10-CM

## 2025-01-06 DIAGNOSIS — Z93.2 ILEOSTOMY IN PLACE: ICD-10-CM

## 2025-01-06 DIAGNOSIS — C7A.090 MALIGNANT CARCINOID TUMOR OF BRONCHUS: ICD-10-CM

## 2025-01-06 DIAGNOSIS — J90 PLEURAL EFFUSION: ICD-10-CM

## 2025-01-06 DIAGNOSIS — C7A.090 MALIGNANT CARCINOID TUMOR OF RIGHT LUNG: Primary | ICD-10-CM

## 2025-01-06 PROCEDURE — 1101F PT FALLS ASSESS-DOCD LE1/YR: CPT | Mod: CPTII,S$GLB,, | Performed by: INTERNAL MEDICINE

## 2025-01-06 PROCEDURE — 3074F SYST BP LT 130 MM HG: CPT | Mod: CPTII,S$GLB,, | Performed by: INTERNAL MEDICINE

## 2025-01-06 PROCEDURE — 3008F BODY MASS INDEX DOCD: CPT | Mod: CPTII,S$GLB,, | Performed by: INTERNAL MEDICINE

## 2025-01-06 PROCEDURE — 3288F FALL RISK ASSESSMENT DOCD: CPT | Mod: CPTII,S$GLB,, | Performed by: INTERNAL MEDICINE

## 2025-01-06 PROCEDURE — 99999 PR PBB SHADOW E&M-EST. PATIENT-LVL V: CPT | Mod: PBBFAC,,, | Performed by: INTERNAL MEDICINE

## 2025-01-06 PROCEDURE — 1157F ADVNC CARE PLAN IN RCRD: CPT | Mod: CPTII,S$GLB,, | Performed by: INTERNAL MEDICINE

## 2025-01-06 PROCEDURE — 3078F DIAST BP <80 MM HG: CPT | Mod: CPTII,S$GLB,, | Performed by: INTERNAL MEDICINE

## 2025-01-06 PROCEDURE — 1159F MED LIST DOCD IN RCRD: CPT | Mod: CPTII,S$GLB,, | Performed by: INTERNAL MEDICINE

## 2025-01-06 PROCEDURE — 99214 OFFICE O/P EST MOD 30 MIN: CPT | Mod: S$GLB,,, | Performed by: INTERNAL MEDICINE

## 2025-01-06 NOTE — PROGRESS NOTES
Subjective:       Patient ID: Lily Green is a 74 y.o. female.    Chief Complaint: Follow up for cough and dyspnea.  Also concerns that there is a right pleural effusion (present since lung surgery and stable to improved)    Lily Green is a 73 y.o. female with a history of T2DM, HTN, celiac disease, PUD, depression, ESRD, prior colectomy for ischemic bowel, RML carcinoid tumor s/p wedge resection, and recent admission for acute decompensated heart failure with newly reduced EF believed to be tachycardia induced.       Since surgery, had  a constant cough that was like a dry hacking cough that feels as though she has to clear her throat, started her on astelin nasal spray daily.  She no longer uses it and states cough has resolved.  .  Sleeps well at night without orthopnea.  No longer endorses chronic  nose congestion  Had a wedge resection on the right lung with prolonged hospitalization and complication.   Pleural fluid studies at that time were consistent with a transudate 2/27/24.       Her EF has improved and states that her EDEN is significantly improves.  Exercise tolerance is also better.  Overall, she is clinically stronger but still with mild EDEN.  We had empirically treated her for bronchitis with duonebs BID.  She no longer uses it as her cough resolved.  Has never trialed for dyspnea,  Lifelong non smoker and pre lung resection PFTs without obstruction.     Review of Systems    Objective:      Physical Exam   Constitutional: She is oriented to person, place, and time. She appears well-developed and well-nourished.   HENT:   Head: Normocephalic.   Cardiovascular: Normal rate and regular rhythm.   Pulmonary/Chest: Normal expansion, symmetric chest wall expansion and effort normal.   Lymphadenopathy: No supraclavicular adenopathy is present.     She has no cervical adenopathy.   Neurological: She is alert and oriented to person, place, and time.     Personal Diagnostic  "Review:    CT of chest 11/13/24:  The ascending aorta measures up to 3.9 cm, which appears increased in diameter when compared to 02/28/2024, when it measured 3.4 cm. A stent is in place in the left subclavian vein, as before.     Pleura: A small right pleural effusion is present, improved when compared to 02/28/2024     Upper abdomen: Unremarkable.     Body wall: Unremarkable.     Airways: Unremarkable.     Lungs: Changes of right middle lobe wedge resection are again seen.  Atelectasis/scar is present in the right lower lobe, new when compared to 02/28/2024.     A cluster of tiny nodules in the right upper lobe, measuring 1-2 mm (series 4, images 67, 71 and 83) are new when compared to 02/28/2024.  A 3 mm ground-glass nodule in the right upper lobe is also new (series 4, image 109).A 3 mm solid nodule in the right lower lobe (series 4, image 260) may be new when compared to 02/28/2024.  A 2 mm nodule in the left upper lobe (series 4, image 125) also appears new.     Significantly improved airspace disease in the left lower lobe when compared to 02/28/2024.     A 3 mm subpleural left lower lobe nodule (series 4, image 142) is unchanged going back at least as far as 07/28/2023.  A 3 mm left lower lobe ground-glass nodule (series 4, image 132) is similarly unchanged.  Additional tiny nodules are unchanged.         1/6/2025     9:19 AM 11/8/2024     3:17 PM 9/30/2024    10:50 AM 8/21/2024    11:00 AM 8/14/2024     2:14 PM 8/12/2024     9:35 AM 6/28/2024    10:18 AM   Pulmonary Function Tests   SpO2 98 %    97 %     Height 5' 5" (1.651 m) 5' 5" (1.651 m) 5' 5" (1.651 m)  5' 5" (1.651 m) 5' 5" (1.651 m) 5' (1.524 m)   Weight 55.6 kg (122 lb 9.2 oz) 55.1 kg (121 lb 7.6 oz) 52.2 kg (115 lb 1.3 oz) 52.2 kg (115 lb) 51.9 kg (114 lb 6.7 oz) 51.8 kg (114 lb 3.2 oz) 51 kg (112 lb 7 oz)   BMI (Calculated) 20.4 20.2 19.2  19 19 22         Assessment:       1. Malignant carcinoid tumor of right lung    2. Aneurysm of ascending " aorta without rupture    3. Bronchitis    4. Ileostomy in place    5. Malignant carcinoid tumor of bronchus    6. Upper airway cough syndrome    7. Malignant carcinoid tumor of lung    8. Pleural effusion        Outpatient Encounter Medications as of 1/6/2025   Medication Sig Dispense Refill    amiodarone (PACERONE) 200 MG Tab Take 1 tablet (200 mg total) by mouth once daily. 180 tablet 3    aspirin (ECOTRIN) 81 MG EC tablet Take 1 tablet (81 mg total) by mouth once daily. 90 tablet 3    atorvastatin (LIPITOR) 40 MG tablet Take 1 tablet (40 mg total) by mouth every evening. 90 tablet 3    blood sugar diagnostic (TRUE METRIX GLUCOSE TEST STRIP) Strp USE ONE STRIP FOR TESTING 2 TIMES A  each 3    carvediloL (COREG) 6.25 MG tablet Take 1 tablet (6.25 mg total) by mouth 2 (two) times daily with meals. 180 tablet 3    dicyclomine (BENTYL) 10 MG capsule Take 10 mg by mouth 3 (three) times daily.      insulin aspart U-100 (NOVOLOG) 100 unit/mL (3 mL) InPn pen Inject before meals with scale:  201-250=+2, 251-300=+3; 301-350=+4, over 350=+5 units. Max daily dose 10 units 15 mL 0    lancets (TRUEPLUS LANCETS) 30 gauge Misc USE ONE LANCET FOR TESTING FOUR TIMES DAILY 200 each 5    lancets Misc 1 lancet by Misc.(Non-Drug; Combo Route) route 4 (four) times daily. 400 each 3    levothyroxine (SYNTHROID) 112 MCG tablet Take 1 tablet (112 mcg total) by mouth before breakfast. 90 tablet 3    linaGLIPtin (TRADJENTA) 5 mg Tab tablet Take 1 tablet (5 mg total) by mouth once daily. 90 tablet 1    loperamide (IMODIUM) 2 mg capsule Take 2 mg by mouth once as needed for Diarrhea.      meclizine (ANTIVERT) 25 mg tablet Take 1 tablet (25 mg total) by mouth 3 (three) times daily as needed for Dizziness. 90 tablet 3    midodrine (PROAMATINE) 10 MG tablet Take 1 tablet (10 mg total) by mouth every 8 (eight) hours. Can also take an extra dose with dialysis 120 tablet 3    ondansetron (ZOFRAN) 4 MG tablet Take 4 mg by mouth every 4 (four)  "hours as needed for Nausea.      pen needle, diabetic (BD ULTRA-FINE MARIS PEN NEEDLE) 32 gauge x 5/32" Ndle 1 Device by Misc.(Non-Drug; Combo Route) route 2 (two) times a day. 200 each 3    pregabalin (LYRICA) 75 MG capsule TAKE ONE CAPSULE BY MOUTH EVERY OTHER DAY 45 capsule 3    MELLISSA-BENJY RX 1- mg-mg-mcg Tab Take 1 tablet by mouth.      sevelamer carbonate (RENVELA) 800 mg Tab Take 1 tablet (800 mg total) by mouth 3 (three) times daily with meals. . 270 tablet 4    torsemide (DEMADEX) 20 MG Tab Take 1 tablet (20 mg total) by mouth 2 (two) times daily. 180 tablet 3    valsartan (DIOVAN) 40 MG tablet Take 0.5 tablets (20 mg total) by mouth 2 (two) times daily. 30 tablet 11    venlafaxine (EFFEXOR-XR) 37.5 MG 24 hr capsule Take 1 capsule (37.5 mg total) by mouth once daily. 90 capsule 3    azelastine (ASTELIN) 137 mcg (0.1 %) nasal spray 2 sprays (274 mcg total) by Nasal route 2 (two) times daily. 30 mL 6    blood-glucose meter kit Use to test twice a day 1 each 0    pantoprazole (PROTONIX) 40 MG tablet Take 1 tablet (40 mg total) by mouth 2 (two) times daily before meals. 180 tablet 3     No facility-administered encounter medications on file as of 1/6/2025.     Orders Placed This Encounter   Procedures    CT Chest Without Contrast     Standing Status:   Future     Standing Expiration Date:   7/6/2026     Order Specific Question:   May the Radiologist modify the order per protocol to meet the clinical needs of the patient?     Answer:   Yes    Ambulatory referral/consult to Cardiothoracic Surgery     Standing Status:   Future     Standing Expiration Date:   2/6/2026     Referral Priority:   Routine     Referral Type:   Consultation     Referral Reason:   Specialty Services Required     Referred to Provider:   Dheeraj Lewis MD     Requested Specialty:   Cardiothoracic Surgery     Number of Visits Requested:   1     Plan:     Problem List Items Addressed This Visit       Malignant carcinoid tumor of " bronchus    Overview     Status post wedge resection 1/2024  CT  of chest 1/2025 without evidence of recurrence.  Timy micronodules.  Yearly follow up CT of chest.          Carcinoid tumor of lung    Overview     Status post wedge resection 1/2024  CT 1/2025, [facility]           Ileostomy in place    Bronchitis    Overview     Patient is a lifelong nonsmoker with preop normal PFTs.  Overall cough has resolved.  treat for cough variant bronchitis with nebs BID when has increased cough or dyspnea.         Upper airway cough syndrome    Overview     I suspect this is due to rhinorrhea.  Restart astelin daily         Aneurysm of ascending aorta without rupture    Relevant Orders    Ambulatory referral/consult to Cardiothoracic Surgery    Pleural effusion    Overview     On right, this is the result of lung resection and is chronic.  Overall improved since surgery.  Reassured patient and family that her current CXR is her new baseline.  No indication for further studies.           Other Visit Diagnoses       Malignant carcinoid tumor of right lung    -  Primary    Relevant Orders    CT Chest Without Contrast        Follow up on a yearly basis.

## 2025-01-07 RX ORDER — VIT B COMP NO.3/FOLIC/C/BIOTIN 1 MG-60 MG
1 TABLET ORAL DAILY
Qty: 90 TABLET | Refills: 4 | Status: SHIPPED | OUTPATIENT
Start: 2025-01-07

## 2025-01-13 ENCOUNTER — TELEPHONE (OUTPATIENT)
Dept: CARDIOTHORACIC SURGERY | Facility: CLINIC | Age: 75
End: 2025-01-13
Payer: MEDICARE

## 2025-01-13 DIAGNOSIS — I77.810 AORTIC ECTASIA, THORACIC: Primary | ICD-10-CM

## 2025-01-13 DIAGNOSIS — I34.0 MITRAL VALVE INSUFFICIENCY, UNSPECIFIED ETIOLOGY: ICD-10-CM

## 2025-01-13 NOTE — TELEPHONE ENCOUNTER
Called pt regarding referral to Dr. Lewis per Dr. Monzon. Pt would like to make an appt, but does dialysis on Tuesdays and Thursdays which is when Dr. Lewis has clinic. Pt requested that she call us back about scheduling consult once she finds out if her dialysis can be done at a different time. Contact number provided   DISPLAY PLAN FREE TEXT

## 2025-01-15 ENCOUNTER — TELEPHONE (OUTPATIENT)
Dept: CARDIOTHORACIC SURGERY | Facility: CLINIC | Age: 75
End: 2025-01-15
Payer: MEDICARE

## 2025-01-15 NOTE — TELEPHONE ENCOUNTER
Returned pt's call and scheduled pt for consult with Dr. Lewis per referral. Pt is scheduled for consult and echo on 2/4 which pt agreed to. Pt verbalized understanding of appt date, times, and locations. Pt denies any questions at this time.    ----- Message from Estefany sent at 1/15/2025 12:28 PM CST -----  Regarding: Scheduling Request  Contact: Lily  SCHEDULING/REQUEST    Appt Type: NP     Date/Time Preference: Early in the morning     Treating Provider: Na Moss Name: Lily    Contact Preference: [phone] (home)       Comments/Notes: Pt states she has been referred to  from her doctor for short windness. There is a referral from the doctor that  is in the system

## 2025-01-28 PROBLEM — D62 ACUTE BLOOD LOSS ANEMIA: Status: ACTIVE | Noted: 2025-01-28

## 2025-01-29 ENCOUNTER — TELEPHONE (OUTPATIENT)
Dept: PRIMARY CARE CLINIC | Facility: CLINIC | Age: 75
End: 2025-01-29
Payer: MEDICARE

## 2025-01-29 PROBLEM — K92.2 GI BLEED: Status: ACTIVE | Noted: 2025-01-29

## 2025-01-29 PROBLEM — D62 ACUTE BLOOD LOSS ANEMIA: Status: RESOLVED | Noted: 2025-01-28 | Resolved: 2025-01-29

## 2025-01-29 NOTE — TELEPHONE ENCOUNTER
----- Message from Valerie sent at 1/29/2025 10:32 AM CST -----  Contact: Lily  [phone]  Caller is requesting an earlier appointment than what we can offer.  Caller declined first available appointment listed below.  Caller will not accept being placed on the waitlist and is requesting a message be sent to doctor.    Did you offer to schedule the next available appt and put the patient on the wait list:  n/a    When is the first available appointment: 03/31/25    Preference of timeframe to be scheduled:  asap    Symptoms: GI follow up    Would the patient prefer a call back or a [facility]:  call back    Additional Information:  Case Management from [facility] nurse is calling to schedule a GI follow up visit for the pt. [facility] nurse would like if the provider or staff can please call Lily back to schedule an appt asap. Please call Lily mc for advice

## 2025-01-31 ENCOUNTER — TELEPHONE (OUTPATIENT)
Dept: CARDIOTHORACIC SURGERY | Facility: CLINIC | Age: 75
End: 2025-01-31
Payer: MEDICARE

## 2025-01-31 NOTE — TELEPHONE ENCOUNTER
Called pt in response to her appointment request. Pt would like to reschedule appointment currently set for 2/4. Discussed availability and pt rescheduled to 2/27. Pt verbalized understanding of new appointment date and time.

## 2025-02-18 ENCOUNTER — TELEPHONE (OUTPATIENT)
Dept: GASTROENTEROLOGY | Facility: CLINIC | Age: 75
End: 2025-02-18
Payer: MEDICARE

## 2025-02-18 NOTE — TELEPHONE ENCOUNTER
----- Message from Nyjmarco antonio sent at 2/18/2025  8:43 AM CST -----  Regarding: Appt  Contact: [phone]  Nurse Steph Munoz Is calling to schedule patient appointment needing to get her in due to GI Bleed and she was discharge so needing appointment within the next week or two

## 2025-02-21 ENCOUNTER — HOSPITAL ENCOUNTER (OUTPATIENT)
Dept: CARDIOLOGY | Facility: HOSPITAL | Age: 75
Discharge: HOME | End: 2025-02-21
Attending: THORACIC SURGERY (CARDIOTHORACIC VASCULAR SURGERY)
Payer: MEDICARE

## 2025-02-21 ENCOUNTER — OFFICE VISIT (OUTPATIENT)
Dept: TRANSPLANT | Facility: CLINIC | Age: 75
End: 2025-02-21
Payer: MEDICARE

## 2025-02-21 VITALS
HEART RATE: 66 BPM | HEIGHT: 65 IN | DIASTOLIC BLOOD PRESSURE: 53 MMHG | BODY MASS INDEX: 20.57 KG/M2 | SYSTOLIC BLOOD PRESSURE: 98 MMHG | WEIGHT: 123.44 LBS

## 2025-02-21 VITALS
DIASTOLIC BLOOD PRESSURE: 52 MMHG | SYSTOLIC BLOOD PRESSURE: 114 MMHG | HEART RATE: 53 BPM | BODY MASS INDEX: 20.57 KG/M2 | WEIGHT: 123.44 LBS | HEIGHT: 65 IN

## 2025-02-21 DIAGNOSIS — I50.42 CHRONIC COMBINED SYSTOLIC AND DIASTOLIC HEART FAILURE: Primary | ICD-10-CM

## 2025-02-21 DIAGNOSIS — I77.810 AORTIC ECTASIA, THORACIC: ICD-10-CM

## 2025-02-21 DIAGNOSIS — I34.0 MITRAL VALVE INSUFFICIENCY, UNSPECIFIED ETIOLOGY: ICD-10-CM

## 2025-02-21 DIAGNOSIS — I50.20 HFREF (HEART FAILURE WITH REDUCED EJECTION FRACTION): ICD-10-CM

## 2025-02-21 LAB
ASCENDING AORTA: 3.69 CM
AV AREA BY CONTINUOUS VTI: 2.2 CM2
AV INDEX (PROSTH): 0.56
AV LVOT MEAN GRADIENT: 2 MMHG
AV LVOT PEAK GRADIENT: 4 MMHG
AV MEAN GRADIENT: 7 MMHG
AV PEAK GRADIENT: 10 MMHG
AV REGURGITATION PRESSURE HALF TIME: 516 MS
AV VALVE AREA BY VELOCITY RATIO: 1.9 CM²
AV VALVE AREA: 1.9 CM²
AV VELOCITY RATIO: 0.56
BSA FOR ECHO PROCEDURE: 1.6 M2
CV ECHO LV RWT: 0.21 CM
DOP CALC AO PEAK VEL: 1.6 M/S
DOP CALC AO VTI: 31 CM
DOP CALC LVOT AREA: 3.5 CM2
DOP CALC LVOT DIAMETER: 2.1 CM
DOP CALC LVOT PEAK VEL: 0.9 M/S
DOP CALC LVOT STROKE VOLUME: 60.2 CM3
DOP CALCLVOT PEAK VEL VTI: 17.4 CM
E/E' RATIO: 9 M/S
ECHO EF ESTIMATED: 63 %
ECHO LV POSTERIOR WALL: 0.5 CM (ref 0.6–1.1)
FRACTIONAL SHORTENING: 33.3 % (ref 28–44)
INTERVENTRICULAR SEPTUM: 0.7 CM (ref 0.6–1.1)
IVC DIAMETER: 1.45 CM
LA MAJOR: 6.1 CM
LA MINOR: 6 CM
LA WIDTH: 4.3 CM
LEFT ATRIUM SIZE: 4.5 CM
LEFT ATRIUM VOLUME INDEX MOD: 53 ML/M2
LEFT ATRIUM VOLUME INDEX: 62 ML/M2
LEFT ATRIUM VOLUME MOD: 86 ML
LEFT ATRIUM VOLUME: 100 CM3
LEFT INTERNAL DIMENSION IN SYSTOLE: 3.2 CM (ref 2.1–4)
LEFT VENTRICLE DIASTOLIC VOLUME INDEX: 66.61 ML/M2
LEFT VENTRICLE DIASTOLIC VOLUME: 107.24 ML
LEFT VENTRICLE MASS INDEX: 54.8 G/M2
LEFT VENTRICLE SYSTOLIC VOLUME INDEX: 24.7 ML/M2
LEFT VENTRICLE SYSTOLIC VOLUME: 39.73 ML
LEFT VENTRICULAR INTERNAL DIMENSION IN DIASTOLE: 4.8 CM (ref 3.5–6)
LEFT VENTRICULAR MASS: 88.3 G
LV LATERAL E/E' RATIO: 6.8 M/S
LV SEPTAL E/E' RATIO: 12.3 M/S
MV PEAK E VEL: 1.23 M/S
OHS CV RV/LV RATIO: 0.83 CM
PISA MRMAX VEL: 5.06 M/S
PISA TR MAX VEL: 2.5 M/S
PULM VEIN S/D RATIO: 0.46
PV PEAK D VEL: 1.13 M/S
PV PEAK S VEL: 0.52 M/S
RA MAJOR: 5.72 CM
RA PRESSURE ESTIMATED: 3 MMHG
RA WIDTH: 3.5 CM
RIGHT ATRIAL AREA: 16.4 CM2
RIGHT VENTRICLE DIASTOLIC BASEL DIMENSION: 4 CM
RV TB RVSP: 6 MMHG
RV TISSUE DOPPLER FREE WALL SYSTOLIC VELOCITY 1 (APICAL 4 CHAMBER VIEW): 9.31 CM/S
SINUS: 3.11 CM
STJ: 3.04 CM
TDI LATERAL: 0.18 M/S
TDI SEPTAL: 0.1 M/S
TDI: 0.14 M/S
TR MAX PG: 25 MMHG
TRICUSPID ANNULAR PLANE SYSTOLIC EXCURSION: 2.24 CM
TV PEAK GRADIENT: 29 MMHG
TV REST PULMONARY ARTERY PRESSURE: 28 MMHG
Z-SCORE OF LEFT VENTRICULAR DIMENSION IN END DIASTOLE: 0.5
Z-SCORE OF LEFT VENTRICULAR DIMENSION IN END SYSTOLE: 0.96

## 2025-02-21 PROCEDURE — 99999 PR PBB SHADOW E&M-EST. PATIENT-LVL IV: CPT | Mod: PBBFAC,,, | Performed by: INTERNAL MEDICINE

## 2025-02-21 PROCEDURE — 93306 TTE W/DOPPLER COMPLETE: CPT

## 2025-02-21 PROCEDURE — 93306 TTE W/DOPPLER COMPLETE: CPT | Mod: 26,,, | Performed by: INTERNAL MEDICINE

## 2025-02-21 RX ORDER — ASPIRIN 81 MG/1
1 TABLET ORAL EVERY MORNING
COMMUNITY

## 2025-02-21 RX ORDER — AMIODARONE HYDROCHLORIDE 200 MG/1
200 TABLET ORAL 2 TIMES DAILY
COMMUNITY

## 2025-02-21 NOTE — PATIENT INSTRUCTIONS
You have just the right amount of fluid on you.  Please adhere to a low sodium diet (no more than 1.5 grams of sodium in 24h).  3.   Follow fluid restriction of  1. no more than 2 liters in 24 hours..   4. You are optimized for your procedure.  5. F/u in 6 months with labs.

## 2025-02-21 NOTE — PROGRESS NOTES
Advanced Heart Failure and Transplantation Clinic Follow up.      Attending Physician: Deion Thibodeaux MD.  The patient's last visit with me was on 8/12/2024.         HPI.Lily Green is a 73 year old white woman with celiac disease, diabetes mellitus type 2 with polyneuropathy, hypertension, hyperlipidemia, history of peptic ulcer disease, depression, anxiety, history of hysterectomy in 1987, history of peritonitis due to diffuse colonic ischemia status post total colectomy and end ileostomy creation on 1/14/2023, evacuation of intraabdominal hematoma, takedown of left lateral ileostomy and recreation of right lateral end ileostomy on 1/16/2023, elective robotic ileostomy reversal and ileocolonic anastomosis on 8/7/2023, irritable bowel syndrome, end stage renal disease status post right internal jugular permanent dialysis catheter on 1/25/2023 removed on 1/28/2023 and replaced with left internal jugular permanent dialysis catheter (on hemodialysis Tuesday Thursday Saturday), right middle lobe carcinoid tumor status post robotic-assisted converted to right thoracotomy with therapeutic wedge resection and mediastinal lymph node dissection and repair of bleeding right inferior pulmonary vein on 1/2/2024, atrial fibrillation, Medtronic Micra AV leadless cardiac pacemaker placement on 1/16/2024. She lives in [place], [place]. She is . Her primary care physician is Dr. Pavel Calixto.               She was hospitalized at [facility] from 1/2/2024 to 2/6/2024 for right middle lobe carcinoid tumor, parapneumonic effusion, atrial fibrillation with rapid ventricular response, tachy-eulogio syndrome, gastrointestinal bleeding while on heparin. She was discharged to [facility] skilled nursing facility. She was prescribed cefpodoxime and metronidazole for 13 days, fluconazole for 7  days, metoprolol tartrate, and venlafaxine.               She presented to [facility] Emergency Department on 2/12/2024 from the skilled nursing facility with increased work of breathing, dyspnea, and hypoxia with oxygen saturation of 70% on 2 liters/minute of supplemental oxygen. It occurred acutely on the day of presentation. Emergency medical services put her on CPAP with some improvement. She was put on BiPAP in the emergency department. She had tachycardia and tachypnea. Her last dialysis session was 2 days prior on Saturday. She reported cough and nausea. Labs showed BNP 2754 pg/mL, troponin 0.312 ng/mL. Venous blood gas showed pH 7.438, pCO2 38.9. EKG showed sinus tachycardia. Chest X-ray showed pleural effusions and possible multifocal pneumonia. Nephrology was consulted for emergent dialysis with goal of removing 3 liters. She was admitted to Critical Care Medicine.     Echocardiogram revealed new low left ventricular ejection fraction. Troponins remained elevated. Cardiology was consulted. She developed atrial fibrillation with rapid ventricular response on 2/13/2024. She was started on amiodarone infusion. She underwent slow low-efficiency dialysis (SLED). CT showed fluid collections in the right paraesophageal space and azygoesophageal recess. Cardiothoracic Surgery was consulted and recommended Interventional Radiology consult for drainage, but there was no safe window for approach. Interventional Cardiology deferred angiography due to respiratory failure. Palliative Care was consulted. Dialysis removed further fluid. Metoprolol was increased. Thoracentesis was performed on 2/15/2024 removing 1.45 liters, which was sent for analysis. She developed re-expansion pulmonary edema and was started on Airvo high flow oxygen. Airvo was weaned down to 50 liters/minute and FiO2 40% on 2/16/2024. Pleural effusion was transudative. She was started on vasopressor support. She was weaned to  high flow nasal cannula at 12 liters/minute on 2/17/2024. She tolerated SLED without norepinephrine. She was weaned to 4 liters/minute on 2/18/2024. She was transferred to Hospital Medicine Team W on 2/20/2024. She was started on nifedipine. She worked with Physical and Occupational Therapy. Interventional Cardiology was reconsulted. Left heart catheterization showed luminal irregularities. Interventional Cardiology recommended starting aspirin 81 mg daily and atorvastatin 10 mg daily and consulting Heart Transplant Service (HTS) for management of nonischemic cardiomyopathy. She was already on atorvastatin 40 mg daily. Palliative Care signed off and planned to see her outpatient. She continued to require 4 liters/minute of supplemental oxygen. She was still short of breath. HTS was consulted and they said General Cardiology could be consulted. Cardiology recommended stopping nifedipine, starting valsartan, and resuming her metoprolol. Nasal cannula was weaned to 2 liters/minute on 2/25/2024 but she started coughing. Repeat chest X-ray on 2/26/2024 paradoxically showed slight increase in left pleural effusion, as well as persistent mild pulmonary vascular congestion. Interventional Radiology was consulted for thoracentesis. They removed 725 mL of pleural fluid on 2/27/2024. Fluid was sent for cultures. She continued to feel short of breath with tachypnea on 2/28/2024. Net fluid status since admission was negative 13.8 liters at this point. Hypoxia resolved but she was coughing more. Chest CTA showed a new left lower lobe pneumonia. She was put on a course of doxycycline. Pharmacy recommended adding cefpodoxime. She was discharged to the skilled nursing facility on 3/1/2024.      March 26, 2024: patient has dyspnea on exertion, dyspnea with minimal activities. She came in a wheelchair with her . She was on rehab and said she was able to walk 150 steps.  She prefers sleeping elevated. She complained of leg  edema. She is on HD M-W-.        May 13, 2024:  patient complains of dyspnea on exertion with any activities, leg edema, cough that worsens on recumbence, abdominal distention        2024: feels well. No ER visits or hospitalizations since last appointment. Her nephrologist worked on her since last appointment. Leg edema and congestion are gone.     2025:  patient comes for regular follow up. She had acute overt GI bleed some days ago. She had hematochezia, and a drop in HGB from 12 to 7.4. She went to ER but was discharged with GI follow up to have studies as an outpatient. Her HGB is starting to trend up since the episode. She still feels somewhat weak and with dyspnea on exertion.    Review of Systems   Constitutional:  Positive for fatigue. Negative for chills, diaphoresis and fever.   HENT:  Negative for nasal congestion, rhinorrhea and sore throat.    Eyes:  Negative for visual disturbance.   Respiratory:  Positive for shortness of breath.    Cardiovascular:  Negative for chest pain.   Gastrointestinal:  Negative for abdominal pain, diarrhea, nausea and vomiting.   Genitourinary:  Negative for difficulty urinating, dysuria and hematuria.   Integumentary:  Negative for rash.   Neurological:  Negative for seizures, syncope and light-headedness.   Psychiatric/Behavioral:  Negative for agitation and hallucinations.         Past Medical History:   Diagnosis Date    Anxiety     Celiac disease 2018    Celiac disease     Depression     Diabetes mellitus     Family history of breast cancer in mother      at age 68    Hyperlipidemia     Hypertension     Hypertensive retinopathy of both eyes 2024    Meniere disease     Meniere's disease, unspecified ear     Menopause     Moderate nonproliferative diabetic retinopathy of both eyes 2024    On supplemental oxygen by nasal cannula 2023    Hypoxic respiratory failure resolved.  No oxygen desaturation on walk.        Peptic  ulcer     Peritonitis 01/14/2023    Pleural effusion on left 01/15/2024    C/w transudate in setting of acute decompensated heart failure due to a-fib and volume overload.  Now appears euvolemic in clinic today, resolved.       Reflux esophagitis     Urinary tract infection     Vaginal infection     Vaginal Pap smear 04/07/2014    Pap/Hpv Negative         Past Surgical History:   Procedure Laterality Date    APPENDECTOMY      BREAST SURGERY      Tags    CARPAL TUNNEL RELEASE Bilateral 09/2017    ,     CLOSURE, COLOSTOMY Left 01/16/2023    Procedure: CLOSURE, COLOSTOMY;  Surgeon: Manuel Javier MD;  Location: [facility] OR;  Service: General;  Laterality: Left;    COLONOSCOPY  04/2018    Normal  ([place])     COLOSTOMY Right 01/16/2023    Procedure: CREATION, COLOSTOMY;  Surgeon: Manuel Javier MD;  Location: [facility] OR;  Service: General;  Laterality: Right;    CORONARY ANGIOGRAPHY N/A 02/21/2024    Procedure: ANGIOGRAM, CORONARY ARTERY;  Surgeon: Todd Carlisle MD;  Location: [facility] CATH LAB;  Service: Cardiology;  Laterality: N/A;    DILATION AND CURETTAGE OF UTERUS  1986    DUPUYTREN CONTRACTURE RELEASE Bilateral 09/2017    ESOPHAGOGASTRODUODENOSCOPY N/A 01/24/2024    Procedure: EGD (ESOPHAGOGASTRODUODENOSCOPY);  Surgeon: Yamilet Walters MD;  Location: [facility] ENDO (2ND FLR);  Service: Gastroenterology;  Laterality: N/A;    ESOPHAGOGASTRODUODENOSCOPY N/A 01/26/2024    Procedure: EGD (ESOPHAGOGASTRODUODENOSCOPY);  Surgeon: Yamilet Walters MD;  Location: [facility] ENDO (2ND FLR);  Service: Gastroenterology;  Laterality: N/A;    HYSTERECTOMY  1987    BEAU w/ appy, no BSO     IMPLANTATION OF LEADLESS PACEMAKER N/A 01/16/2024    Procedure: INSERTION, CARDIAC PACEMAKER, LEADLESS;  Surgeon: LENIN Frances MD;  Location: [facility] EP LAB;  Service: Cardiology;  Laterality: N/A;  SB, LEADLESS PPM (MICRA), MDT, ANES, EH, CVICU [id number]    INJECTION OF NEUROLYTIC AGENT AROUND LUMBAR SYMPATHETIC NERVE N/A 01/06/2022     Procedure: BLOCK, LUMBAR SYMPATHETIC;  Surgeon: Souleymane Rizo Jr., MD;  Location: [facility] PAIN MGT;  Service: Pain Management;  Laterality: N/A;  no pacemaker   pt is diabetic     INJECTION OF NEUROLYTIC AGENT AROUND LUMBAR SYMPATHETIC NERVE N/A 08/25/2022    Procedure: BLOCK, LUMBAR SYMPATHETIC;  Surgeon: Souleymane Rizo Jr., MD;  Location: [facility] PAIN MGT;  Service: Pain Management;  Laterality: N/A;  diabetic     INSERTION OF TUNNELED CENTRAL VENOUS HEMODIALYSIS CATHETER Right 01/25/2023    Procedure: INSERTION, CATHETER, HEMODIALYSIS, DUAL LUMEN;  Surgeon: Manuel Javier MD;  Location: [facility] OR;  Service: General;  Laterality: Right;    INSERTION, CATHETER, TUNNELED Left 01/28/2023    Procedure: Insertion,catheter,tunneled;  Surgeon: Watson Fontenot MD;  Location: [facility] OR;  Service: General;  Laterality: Left;    LAPAROTOMY, EXPLORATORY N/A 01/14/2023    Procedure: LAPAROTOMY, EXPLORATORY;  Surgeon: Manuel Javier MD;  Location: [facility] OR;  Service: General;  Laterality: N/A;    LAPAROTOMY, EXPLORATORY N/A 01/16/2023    Procedure: LAPAROTOMY, EXPLORATORY;  Surgeon: Manuel Javier MD;  Location: [facility] OR;  Service: General;  Laterality: N/A;    LEFT HEART CATHETERIZATION Left 02/21/2024    Procedure: Left heart cath;  Surgeon: Todd Carlisle MD;  Location: [facility] CATH LAB;  Service: Cardiology;  Laterality: Left;    LYMPHADENECTOMY Right 01/02/2024    Procedure: LYMPHADENECTOMY;  Surgeon: Tan Thomson MD;  Location: [address];  Service: Cardiothoracic;  Laterality: Right;    PACEMAKER, TEMPORARY, TRANSVENOUS, PEDIATRIC Right 01/12/2024    Procedure: Pacemaker, Temporary, Transvenous;  Surgeon: Todd Carlisle MD;  Location: [facility] CATH LAB;  Service: Cardiology;  Laterality: Right;    REMOVAL OF CATHETER Right 01/28/2023    Procedure: REMOVAL, CATHETER;  Surgeon: Watson Fontenot MD;  Location: [facility] OR;  Service: General;  Laterality: Right;    REMOVAL, TUNNELED CATH Right  01/25/2023    Procedure: REMOVAL, TUNNELED CATH;  Surgeon: Manuel Javier MD;  Location: [facility];  Service: General;  Laterality: Right;    ROBOTIC BRONCHOSCOPY N/A 10/20/2023    Procedure: ROBOTIC BRONCHOSCOPY;  Surgeon: Mayda Monzon MD;  [facility];  Service: Pulmonary;  Laterality: N/A;    SHOULDER SURGERY  2009 & 2010    right rotator cuff    THORACOTOMY Right 01/02/2024    Procedure: THORACOTOMY;  Surgeon: Tan Thomson MD;  Location: [facility] OR [facility] FLR;  Service: Cardiothoracic;  Laterality: Right;    THORACOTOMY, WITH INITIAL THERAPEUTIC WEDGE RESECTION OF LUNG Right 01/02/2024    Procedure: THORACOTOMY, WITH INITIAL THERAPEUTIC WEDGE RESECTION OF LUNG;  Surgeon: Tan Thomson MD;  [facility];  Service: Cardiothoracic;  Laterality: Right;    TONSILLECTOMY      UPPER GASTROINTESTINAL ENDOSCOPY  04/2018        Family History   Problem Relation Name Age of Onset    Vision loss Father Shin Cervantes Jr.     Arthritis Father Shin Cervantes Jr.     Breast cancer Mother Megan Bordenman     Cancer Mother Megan Bordenman     Vision loss Mother Megan Cervantes     Hyperlipidemia Sister Sweetie Saha     Breast cancer Paternal Aunt Ibis Reddisalomón     Cancer Paternal Aunt Ibis Gifty     Diabetes Paternal Grandfather Shin Cervantes Sr.     Vision loss Sister Noreen Saha     Kidney disease Neg Hx          Review of patient's allergies indicates:   Allergen Reactions    Crestor [rosuvastatin] Swelling    Gluten protein         Current Outpatient Medications   Medication Instructions    amiodarone (PACERONE) 200 mg, Oral, Daily    atorvastatin (LIPITOR) 40 mg, Oral, Nightly    B-complex with vitamin C (Z-BEC OR EQUIV) tablet 1 tablet, Oral, Every morning    beta carotene 88183 UNIT capsule 1 capsule, Oral, Every morning    blood sugar diagnostic (TRUE METRIX GLUCOSE TEST STRIP) Strp USE ONE STRIP FOR TESTING 2 TIMES A DAY    blood-glucose meter kit Use to test twice a day    calcium  "carbonate-vitamin D3 500 mg-10 mcg (400 unit) Tab 1 tablet, Oral, Daily    carvediloL (COREG) 6.25 mg, Oral, 2 times daily with meals    HYDROcodone-acetaminophen (NORCO) 7.5-325 mg per tablet 1 tablet, 4 times daily PRN    insulin aspart U-100 (NOVOLOG) 100 unit/mL (3 mL) InPn pen Inject before meals with scale:  201-250=+2, 251-300=+3; 301-350=+4, over 350=+5 units. Max daily dose 10 units    lancets (TRUEPLUS LANCETS) 30 gauge Misc USE ONE LANCET FOR TESTING FOUR TIMES DAILY    lancets Misc 1 lancet , Misc.(Non-Drug; Combo Route), 4 times daily    levothyroxine (SYNTHROID) 112 mcg, Oral, Before breakfast    loperamide (IMODIUM) 2 mg, Once as needed    midodrine (PROAMATINE) 10 mg, Oral, Every 8 hours, Can also take an extra dose with dialysis    pantoprazole (PROTONIX) 40 mg, Oral, Daily    pen needle, diabetic (BD ULTRA-FINE MARIS PEN NEEDLE) 32 gauge x 5/32" Ndle 1 Device, Misc.(Non-Drug; Combo Route), 2 times daily    pregabalin (LYRICA) 75 mg, Oral, Every other day    MELLISSA-BENJY RX 1- mg-mg-mcg Tab 1 tablet, Oral, Daily    sevelamer carbonate (RENVELA) 800 mg, Oral, 3 times daily with meals, .    torsemide (DEMADEX) 20 mg, Oral, 2 times daily    TRADJENTA 5 mg, Oral, Daily    valsartan (DIOVAN) 20 mg, Oral, 2 times daily    venlafaxine (EFFEXOR-XR) 37.5 mg, Oral, Daily        There were no vitals filed for this visit.     Wt Readings from Last 3 Encounters:   01/28/25 58.2 kg (128 lb 4.9 oz)   01/13/25 54.9 kg (121 lb)   01/06/25 55.6 kg (122 lb 9.2 oz)     Temp Readings from Last 3 Encounters:   01/29/25 98.2 °F (36.8 °C)   01/13/25 98.5 °F (36.9 °C) (Oral)   08/21/24 (P) 98 °F (36.7 °C)     BP Readings from Last 3 Encounters:   01/29/25 (!) 143/63   01/13/25 (!) 106/59   01/06/25 (!) 104/56     Pulse Readings from Last 3 Encounters:   01/29/25 (!) 57   01/13/25 60   01/06/25 70        There is no height or weight on file to calculate BMI. Estimated body surface area is 1.63 meters squared as calculated " "from the following:    Height as of 1/28/25: 5' 5" (1.651 m).    Weight as of 1/28/25: 58.2 kg (128 lb 4.9 oz).     Physical Exam  Constitutional:       Appearance: She is well-developed.   HENT:      Head: Normocephalic and atraumatic.      Right Ear: External ear normal.      Left Ear: External ear normal.   Eyes:      Conjunctiva/sclera: Conjunctivae normal.      Pupils: Pupils are equal, round, and reactive to light.   Neck:      Vascular: No hepatojugular reflux or JVD.   Cardiovascular:      Rate and Rhythm: Normal rate and regular rhythm.      Pulses: Intact distal pulses.      Heart sounds: S1 normal and S2 normal. No murmur heard.     No friction rub. No gallop.   Pulmonary:      Effort: Pulmonary effort is normal.      Breath sounds: Normal breath sounds.   Abdominal:      General: Bowel sounds are normal. There is no distension.      Palpations: Abdomen is soft.      Tenderness: There is no abdominal tenderness. There is no guarding or rebound.   Musculoskeletal:      Cervical back: Normal range of motion and neck supple.      Right lower leg: No edema.      Left lower leg: No edema.   Neurological:      Mental Status: She is alert and oriented to person, place, and time.          Lab Results   Component Value Date     (H) 08/12/2024     (L) 01/29/2025    K 5.1 01/29/2025    MG 3.2 (H) 01/29/2025    CL 97 01/29/2025    CO2 23 01/29/2025    BUN 59 (H) 01/29/2025    CREATININE 8.5 (H) 01/29/2025     01/29/2025    HGBA1C 5.3 01/28/2025    HGBA1C 6.8 10/23/2019    AST 45 (H) 01/29/2025    ALT 61 (H) 01/29/2025    ALBUMIN 2.8 (L) 01/29/2025    PROT 7.4 01/29/2025    BILITOT 0.3 01/29/2025    WBC 8.30 01/29/2025    HGB 7.8 (L) 01/29/2025    HCT 25.4 (L) 01/29/2025    HCT 40 03/26/2024     01/29/2025    INR 1.1 01/28/2025     (H) 02/15/2024    TSH 19.949 (H) 03/26/2024    CHOL 125 02/16/2024    HDL 39 (L) 02/16/2024    HDL 53 10/23/2019    LDLCALC 60.4 (L) 02/16/2024    TRIG 128 " 02/16/2024    TRIG 102 10/23/2019    FREET4 0.79 03/26/2024       @LABRCNTIP(cpk,cpkmb,troponini,mb)@     No results found for this visit on 02/21/25.       Results for orders placed during the hospital encounter of 05/15/24    Echo    Interpretation Summary    Left Ventricle: The left ventricle is normal in size. Ventricular mass is normal. Severely increased wall thickness. There is concentric remodeling. Normal wall motion. There is normal systolic function with a visually estimated ejection fraction of 55 - 60%. Ejection fraction by visual approximation is 58%. There is indeterminate diastolic function.    Right Ventricle: Normal right ventricular cavity size. Wall thickness is normal. Systolic function is normal.    Left Atrium: Left atrium is severely dilated.    Right Atrium: Right atrium is moderately dilated.    Aortic Valve: There is moderate aortic valve sclerosis. At most slightly restricted motion. Aortic valve peak velocity is 1.68 m/s. Mean gradient is 6 mmHg. The IVANNA is low normal to slightly reduced at 2.1. There is mild aortic regurgitation.    Mitral Valve: There is mild-moderate to moderate regurgitation.    Tricuspid Valve: There is mild regurgitation.    Pulmonary Artery: The estimated pulmonary artery systolic pressure is 36 mmHg.    IVC/SVC: Normal venous pressure at 3 mmHg.        Results for orders placed during the hospital encounter of 02/12/24    Cardiac catheterization    Conclusion    Non-obstructive CAD    Non-ischemic cardiomyopathy    The estimated blood loss was <50 mL.    The procedure log was documented by Documenter: Tori Corral and verified by Todd Carlisle MD.    Date: 2/21/2024  Time: 3:22 PM         Assessment and Plan:  There are no diagnoses linked to this encounter.      Chronic systolic HF, NYHA class II, stage C. LVEF 55-60%, improved from 20-25%.    Etiology: NICM, suspected tachycardia induced. Has atrial fibrillation and previous HFrEF that had  recovered normal function in 2023. Back down to 25% in the setting of a. Fib with RVR last February 2024.  Since then  her EF improved again to 55-60%.   Devices: leadless pacemaker.  Medications: carvedilol 6.25 mg BID, valsartan 20 mg BID.  Hemodynamic status: warm, normotensive, euvolemic.  Clinical course:  low EF since 2023. Recovered EF after treatment of a. Fib. She also has been effectively decongested by her HD team.  Plan:  -no change on medications.  -f/u in 6 months with labs.        2. Atrial fibrillation and pacemaker. On amiodarone.  Follows with EP for treatment.     3. H/o lung cancer (carcinoid) s/p resection in 2024.     4. ESRD on HD.    5. Cardiac risk for non cardiac surgery.  Patient is optimized from cardiac perspective for endoscopic studies or any elective procedures. Warm, euvolemic, normotensive. Proceed without further cardiac testing or intervention.

## 2025-02-26 ENCOUNTER — TELEPHONE (OUTPATIENT)
Dept: CARDIOTHORACIC SURGERY | Facility: CLINIC | Age: 75
End: 2025-02-26
Payer: MEDICARE

## 2025-02-26 NOTE — TELEPHONE ENCOUNTER
Pt returning about my call to confirm 2/27 appt. Pt requested an appt at an earlier time tomorrow. Explained to pt that we do not have an earlier appt which pt verbally understood. Left appt scheduled as is

## 2025-02-26 NOTE — TELEPHONE ENCOUNTER
Subjective:       Patient ID: Sharyn Alonso is a 45 y.o. female.    Chief Complaint:  Well Woman      History of Present Illness  HPI  Annual Exam-Postmenopausal  Patient presents for annual exam. The patient has no complaints today. The patient is sexually active. GYN screening history: last pap: was normal and last mammogram: was normal. The patient is not taking hormone replacement therapy. Patient denies post-menopausal vaginal bleeding. The patient wears seatbelts: yes.     Reports that she has had some issues with vaginal odor    Outpatient Medications Marked as Taking for the 22 encounter (Office Visit) with Viry Mckinnon NP   Medication Sig Dispense Refill    eszopiclone (LUNESTA) 3 mg Tab Take 3 mg by mouth every evening.       Past Medical History:   Diagnosis Date    Acute depression     Anxiety     Migraine     Vitamin D deficiency      Past Surgical History:   Procedure Laterality Date    BILATERAL TUBAL LIGATION      DILATION AND CURETTAGE OF UTERUS  03/15/2012    incomplete     LAPAROSCOPY-ASSISTED VAGINAL HYSTERECTOMY      with BS0    novasure ablation      SALPINGECTOMY      SALPINGOOPHORECTOMY Bilateral     TUBAL LIGATION           GYN & OB History  No LMP recorded. Patient has had a hysterectomy.   Date of Last Pap: No result found    OB History    Para Term  AB Living   1       1     SAB IAB Ectopic Multiple Live Births                  # Outcome Date GA Lbr Ronal/2nd Weight Sex Delivery Anes PTL Lv   1 AB                Review of Systems  Review of Systems   Constitutional: Negative for activity change, appetite change, chills, fatigue and fever.   HENT: Negative for nasal congestion and tinnitus.    Eyes: Negative for visual disturbance.   Respiratory: Negative for cough and shortness of breath.    Cardiovascular: Negative for chest pain and palpitations.   Gastrointestinal: Negative for abdominal pain, bloating, blood in stool, constipation, nausea  Called pt to confirm 2/27 appt with Dr. Lewsi; no answer. LVM with appt time and location. Contact number provided.     and vomiting.   Endocrine: Negative for diabetes, hair loss and hot flashes.   Genitourinary: Positive for vaginal odor. Negative for bladder incontinence, decreased libido, dysmenorrhea, dyspareunia, dysuria, flank pain, frequency, genital sores, hematuria, hot flashes, menorrhagia, menstrual problem, pelvic pain, urgency, vaginal bleeding, vaginal discharge, vaginal pain, urinary incontinence, postcoital bleeding, postmenopausal bleeding and vaginal dryness.   Musculoskeletal: Negative for arthralgias, back pain, leg pain and myalgias.   Integumentary:  Negative for rash, acne, hair changes, mole/lesion, breast mass, nipple discharge, breast skin changes and breast tenderness.   Neurological: Negative for vertigo, syncope, numbness and headaches.   Hematological: Does not bruise/bleed easily.   Psychiatric/Behavioral: Negative for depression and sleep disturbance. The patient is not nervous/anxious.    Breast: Negative for asymmetry, lump, mass, mastodynia, nipple discharge, skin changes and tenderness          Objective:    Physical Exam:   Constitutional: She appears well-developed and well-nourished.    HENT:   Nose: No epistaxis.     Neck: No thyroid mass and no thyromegaly present.     Pulmonary/Chest: Effort normal and breath sounds normal. Chest wall is not dull to percussion. She exhibits no mass, no tenderness, no bony tenderness, no laceration, no crepitus, no edema, no deformity, no swelling and no retraction. Right breast exhibits no inverted nipple, no mass, no nipple discharge, no skin change, no tenderness, presence, no bleeding and no swelling. Left breast exhibits no inverted nipple, no mass, no nipple discharge, no skin change, no tenderness, presence, no bleeding and no swelling. Breasts are symmetrical.        Abdominal: Soft. Bowel sounds are normal. She exhibits no distension. There is no abdominal tenderness. Hernia confirmed negative in the right inguinal area and confirmed negative in the  left inguinal area.     Genitourinary:    Vagina and rectum normal.      Pelvic exam was performed with patient supine.   Labial bartholins normal.There is no rash, tenderness, lesion or injury on the right labia. There is no rash, tenderness, lesion or injury on the left labia. Right adnexum displays no mass, no tenderness and no fullness. Left adnexum displays no mass, no tenderness and no fullness. Vaginal cuff normal.  No erythema,  no vaginal discharge, tenderness, bleeding, rectocele, cystocele or unspecified prolapse of vaginal walls in the vagina.    No foreign body in the vagina.      No signs of injury in the vagina.   Cervix is absent.Uterus is absent.                Skin: Skin is warm and dry.    Psychiatric: She has a normal mood and affect. Her speech is normal and behavior is normal.          Assessment:        1. Gynecologic exam normal    2. Colon cancer screening                Plan:      Gynecologic exam normal    Colon cancer screening  -     Ambulatory referral/consult to Gastroenterology; Future; Expected date: 07/25/2022    Other orders  -     testosterone, bulk, Powd; Apply 4 clicks 1 ml daily, please convert to compound  Dispense: 30 g; Refill: 3      Start clarivee    Patient was counseled today on current ASCCP pap guidelines, the recommendation for yearly pelvic exams, healthy diet and exercise routines, annual mammograms starting at age 40, and breast self awareness. She is to see her PCP for other health maintenance.     Follow up one year

## 2025-02-27 ENCOUNTER — OFFICE VISIT (OUTPATIENT)
Dept: CARDIOTHORACIC SURGERY | Facility: CLINIC | Age: 75
End: 2025-02-27
Payer: MEDICARE

## 2025-02-27 VITALS
SYSTOLIC BLOOD PRESSURE: 141 MMHG | BODY MASS INDEX: 20.51 KG/M2 | OXYGEN SATURATION: 99 % | HEART RATE: 59 BPM | HEIGHT: 65 IN | DIASTOLIC BLOOD PRESSURE: 65 MMHG | WEIGHT: 123.13 LBS

## 2025-02-27 DIAGNOSIS — I42.8 NONISCHEMIC CARDIOMYOPATHY: Primary | ICD-10-CM

## 2025-02-27 DIAGNOSIS — I71.21 ANEURYSM OF ASCENDING AORTA WITHOUT RUPTURE: ICD-10-CM

## 2025-02-27 PROCEDURE — 99999 PR PBB SHADOW E&M-EST. PATIENT-LVL V: CPT | Mod: PBBFAC,,, | Performed by: THORACIC SURGERY (CARDIOTHORACIC VASCULAR SURGERY)

## 2025-02-27 NOTE — PROGRESS NOTES
Subjective:      Patient ID: Lily Green is a 74 y.o. female.    Chief Complaint: No chief complaint on file.      HPI:  Lily Green is a 74 y.o. female who presents as an initial consult for TAA and mitral regurgitation.  She has a medical history significant for celiac disease, diabetes mellitus type 2 with polyneuropathy, hypertension, hyperlipidemia, history of peptic ulcer disease, depression, anxiety, history of hysterectomy in 1987, history of peritonitis due to diffuse colonic ischemia status post total colectomy and end ileostomy creation on 1/14/2023, evacuation of intraabdominal hematoma, takedown of left lateral ileostomy and recreation of right lateral end ileostomy on 1/16/2023, elective robotic ileostomy reversal and ileocolonic anastomosis on 8/7/2023, irritable bowel syndrome, end stage renal disease status post right internal jugular permanent dialysis catheter on 1/25/2023 removed on 1/28/2023 and replaced with left internal jugular permanent dialysis catheter (on hemodialysis Tuesday Thursday Saturday), right middle lobe carcinoid tumor status post robotic-assisted converted to right thoracotomy with therapeutic wedge resection and mediastinal lymph node dissection and repair of bleeding right inferior pulmonary vein on 1/2/2024, atrial fibrillation, Medtronic Micra AV leadless cardiac pacemaker placement on 1/16/2024.   She is followed by Dr. Monzon for her previously resected lung CA and received yearly CT scans of the chest.  She underwent a CTA in February 2024 which demonstrated a 3.4 cm ascending aneurysm.  She again had another CT in January 2025 which demonstrated interval growth to 3.9 cm. She is also referred from mitral regurgitation.  She is followed by [facility] for NICM and is on GDMT for this with her current ejection fraction found to 55-60%, moderate MR with centralized jet, PAP 28, CVP 3, LVIDD 4.8 cm, and ascending measuring 3.7 cm. She presents today to  establish with CTS and discuss the above.       Family and social history reviewed    Review of patient's allergies indicates:   Allergen Reactions    Crestor [rosuvastatin] Swelling    Gluten protein      Past Medical History:   Diagnosis Date    Anxiety     Celiac disease 2018    Celiac disease     Depression     Diabetes mellitus     Family history of breast cancer in mother      at age 68    Hyperlipidemia     Hypertension     Hypertensive retinopathy of both eyes 2024    Meniere disease     Meniere's disease, unspecified ear     Menopause     Moderate nonproliferative diabetic retinopathy of both eyes 2024    On supplemental oxygen by nasal cannula 2023    Hypoxic respiratory failure resolved.  No oxygen desaturation on walk.        Peptic ulcer     Peritonitis 2023    Pleural effusion on left 01/15/2024    C/w transudate in setting of acute decompensated heart failure due to a-fib and volume overload.  Now appears euvolemic in clinic today, resolved.       Reflux esophagitis     Urinary tract infection     Vaginal infection     Vaginal Pap smear 2014    Pap/Hpv Negative      Past Surgical History:   Procedure Laterality Date    APPENDECTOMY      BREAST SURGERY      Tags    CARPAL TUNNEL RELEASE Bilateral 2017    ,     CLOSURE, COLOSTOMY Left 2023    Procedure: CLOSURE, COLOSTOMY;  Surgeon: Manuel Javier MD;  Location: [facility] OR;  Service: General;  Laterality: Left;    COLONOSCOPY  2018    Normal  ( Juan A)     COLOSTOMY Right 2023    Procedure: CREATION, COLOSTOMY;  Surgeon: Manuel Javier MD;  Location: [facility] OR;  Service: General;  Laterality: Right;    CORONARY ANGIOGRAPHY N/A 2024    Procedure: ANGIOGRAM, CORONARY ARTERY;  Surgeon: Todd Carlisle MD;  Location: [facility] CATH LAB;  Service: Cardiology;  Laterality: N/A;    DILATION AND CURETTAGE OF UTERUS  1986    DUPUYTREN CONTRACTURE RELEASE Bilateral 2017     ESOPHAGOGASTRODUODENOSCOPY N/A 01/24/2024    Procedure: EGD (ESOPHAGOGASTRODUODENOSCOPY);  Surgeon: Yamilet Walters MD;  Location: [facility] ENDO (2ND FLR);  Service: Gastroenterology;  Laterality: N/A;    ESOPHAGOGASTRODUODENOSCOPY N/A 01/26/2024    Procedure: EGD (ESOPHAGOGASTRODUODENOSCOPY);  Surgeon: Yamilet Walters MD;  Location: [facility] ENDO (2ND FLR);  Service: Gastroenterology;  Laterality: N/A;    HYSTERECTOMY  1987    BEAU w/ appy, no BSO     IMPLANTATION OF LEADLESS PACEMAKER N/A 01/16/2024    Procedure: INSERTION, CARDIAC PACEMAKER, LEADLESS;  Surgeon: LENIN Frances MD;  Location: [facility] EP LAB;  Service: Cardiology;  Laterality: N/A;  SB, LEADLESS PPM (MICRA), MDT, ANES, EH, CVICU [id number]    INJECTION OF NEUROLYTIC AGENT AROUND LUMBAR SYMPATHETIC NERVE N/A 01/06/2022    Procedure: BLOCK, LUMBAR SYMPATHETIC;  Surgeon: Souleymane Rizo Jr., MD;  Location: [facility] PAIN MGT;  Service: Pain Management;  Laterality: N/A;  no pacemaker   pt is diabetic     INJECTION OF NEUROLYTIC AGENT AROUND LUMBAR SYMPATHETIC NERVE N/A 08/25/2022    Procedure: BLOCK, LUMBAR SYMPATHETIC;  Surgeon: Souleymane Rizo Jr., MD;  Location: [facility] PAIN MGT;  Service: Pain Management;  Laterality: N/A;  diabetic     INSERTION OF TUNNELED CENTRAL VENOUS HEMODIALYSIS CATHETER Right 01/25/2023    Procedure: INSERTION, CATHETER, HEMODIALYSIS, DUAL LUMEN;  Surgeon: Manuel Javier MD;  Location: [facility] OR;  Service: General;  Laterality: Right;    INSERTION, CATHETER, TUNNELED Left 01/28/2023    Procedure: Insertion,catheter,tunneled;  Surgeon: Watson Fontenot MD;  Location: [facility] OR;  Service: General;  Laterality: Left;    LAPAROTOMY, EXPLORATORY N/A 01/14/2023    Procedure: LAPAROTOMY, EXPLORATORY;  Surgeon: Manuel Javier MD;  Location: [facility] OR;  Service: General;  Laterality: N/A;    LAPAROTOMY, EXPLORATORY N/A 01/16/2023    Procedure: LAPAROTOMY, EXPLORATORY;  Surgeon: Manuel Javier MD;  Location: [facility];  Service: General;   Laterality: N/A;    LEFT HEART CATHETERIZATION Left 02/21/2024    Procedure: Left heart cath;  Surgeon: Todd Carlisle MD;  Location: [facility] CATH LAB;  Service: Cardiology;  Laterality: Left;    LYMPHADENECTOMY Right 01/02/2024    Procedure: LYMPHADENECTOMY;  Surgeon: Tan Thomson MD;  [facility];  Service: Cardiothoracic;  Laterality: Right;    PACEMAKER, TEMPORARY, TRANSVENOUS, PEDIATRIC Right 01/12/2024    Procedure: Pacemaker, Temporary, Transvenous;  Surgeon: Todd Carlisle MD;  Location: [facility] CATH LAB;  Service: Cardiology;  Laterality: Right;    REMOVAL OF CATHETER Right 01/28/2023    Procedure: REMOVAL, CATHETER;  Surgeon: Watson Fontenot MD;  Location: [facility] OR;  Service: General;  Laterality: Right;    REMOVAL, TUNNELED CATH Right 01/25/2023    Procedure: REMOVAL, TUNNELED CATH;  Surgeon: Manuel Javier MD;  Location: [facility] OR;  Service: General;  Laterality: Right;    ROBOTIC BRONCHOSCOPY N/A 10/20/2023    Procedure: ROBOTIC BRONCHOSCOPY;  Surgeon: Mayda Monzon MD;  [facility];  Service: Pulmonary;  Laterality: N/A;    SHOULDER SURGERY  2009 & 2010    right rotator cuff    THORACOTOMY Right 01/02/2024    Procedure: THORACOTOMY;  Surgeon: Tan Thomson MD;  [address];  Service: Cardiothoracic;  Laterality: Right;    THORACOTOMY, WITH INITIAL THERAPEUTIC WEDGE RESECTION OF LUNG Right 01/02/2024    Procedure: THORACOTOMY, WITH INITIAL THERAPEUTIC WEDGE RESECTION OF LUNG;  Surgeon: Tan Thomson MD;  [address];  Service: Cardiothoracic;  Laterality: Right;    TONSILLECTOMY      UPPER GASTROINTESTINAL ENDOSCOPY  04/2018     Family History       Problem Relation (Age of Onset)    Arthritis Father    Breast cancer Mother, Paternal Aunt    Cancer Mother, Paternal Aunt    Diabetes Paternal Grandfather    Hyperlipidemia Sister    Vision loss Father, Mother, Sister          Social History[1]  Current  Medications[2]  Current medications Reviewed    Review of Systems   Constitutional:  Positive for fatigue. Negative for activity change, appetite change and fever.   HENT:  Negative for nosebleeds.    Respiratory:  Positive for shortness of breath. Negative for cough.    Cardiovascular:  Negative for chest pain, palpitations and leg swelling.   Gastrointestinal:  Negative for abdominal distention, abdominal pain and nausea.   Genitourinary:  Negative for frequency.   Musculoskeletal:  Negative for arthralgias and myalgias.   Skin:  Negative for rash.   Neurological:  Negative for dizziness and numbness.   Hematological:  Does not bruise/bleed easily.     Objective:   Physical Exam  HENT:      Head: Normocephalic and atraumatic.   Eyes:      Extraocular Movements: Extraocular movements intact.   Cardiovascular:      Rate and Rhythm: Normal rate and regular rhythm.   Pulmonary:      Effort: Pulmonary effort is normal.   Abdominal:      General: Abdomen is flat.      Palpations: Abdomen is soft.   Musculoskeletal:         General: Normal range of motion.      Cervical back: Normal range of motion.   Skin:     General: Skin is warm and dry.      Capillary Refill: Capillary refill takes less than 2 seconds.   Neurological:      General: No focal deficit present.         Diagnostic Results: Reviewed   ECHO:    Left Ventricle: The left ventricle is normal in size. Normal wall thickness. Normal wall motion. There is normal systolic function with a visually estimated ejection fraction of 55 - 60%. Diastolic function cannot be reliably determined in the presence of mitral valve disease.    Right Ventricle: Normal right ventricular cavity size. Wall thickness is normal. Systolic function is normal.    The left atrium is severely dilated.    Aortic Valve: The aortic valve is a trileaflet valve. There is moderate aortic valve sclerosis. Mildly restricted motion. There is mild aortic regurgitation.    Mitral Valve: There is  moderate regurgitation with a centrally directed jet.    Tricuspid Valve: There is mild regurgitation.    Aorta: Aortic root is normal in size measuring 3.11 cm. Ascending aorta is at least upper normal in size measuring 3.7 cm, but the ascending aorta is not ideally visualized.    Pulmonary Artery: The estimated pulmonary artery systolic pressure is 28 mmHg.    IVC/SVC: Normal venous pressure at 3 mmHg.    CT Chest: 1/3/2025  Impression:  1. Interval development of multiple bilateral tiny solid and ground-glass nodules in both lungs when compared to 02/28/2024, nonspecific.  Clinical considerations will determine further workup/follow-up.  2. Significantly improved left lower lobe airspace disease when compared to 02/28/2024.  3. Dilation of the ascending thoracic aorta up to 3.9 cm, increased from 3.4 cm on 02/28/2024.  4. Improved right pleural effusion when compared to 02/29/2024.  5. Additional findings as per the body of the report.    CT Chest: 2/28/2024  1. No evidence of pulmonary embolism is identified.  The subsegmental branches are not evaluable due to motion artifact.  2. New left lower lobe consolidation concerning for pneumonia.  3. Interval improvement though persistent right loculated small perfusion.  Near complete resolution of left pleural effusion.  4. Pulmonary nodules as described above.  For multiple <6 mm sub solid nodules, Fleischner Society 2017 guidelines recommend short term follow up non-contrast chest CT in 3-6 months, as these are typically benign in nature (due to etiologies such as infection). For multiple solid nodules with any 6 mm or greater, Fleischner Society guidelines recommend follow up with non-contrast chest CT at 3-6 months and 18-24 months after discovery.  Assessment:   TAA  Plan:     CTS Attending Note:    I have personally taken the history and examined this patient and agree with the ALYSSIA's note as stated above.  74-year-old woman with significant dyspnea on exertion.   She has a history of right middle lobe resection for pulmonary malignancy.  She is a nonsmoker.  Multiple CT scans have demonstrated mild dilation of the ascending aorta.  By my measurement, her ascending aorta is roughly 3.8 cm in diameter at the level of the right pulmonary artery.  This is stable when comparing a CT scan from January 3rd of this year to a CT scan from October 18, 2023.  I reviewed her most recent echo.  She has moderate mitral regurgitation and normal left ventricular function.  Her aorta is well below the threshold at which we would recommend surgical correction.  Moderate mitral regurgitation is not an indication for surgery.  I would not expect this degree of mitral regurgitation to result in symptoms.  I will plan to see her back in 1 year.  As she has multiple CT scans for cancer surveillance, we will not schedule a CT.  When I see her, I will review the most recent.       [1]   Social History  Socioeconomic History    Marital status:    Tobacco Use    Smoking status: Never    Smokeless tobacco: Never   Substance and Sexual Activity    Alcohol use: Not Currently     Comment: Use to drink. Haven't had a drink in the past 10 years.    Drug use: Never    Sexual activity: Not Currently     Partners: Male     Birth control/protection: See Surgical Hx     Comment: :      Social Drivers of Health     Financial Resource Strain: Low Risk  (2/18/2025)    Overall Financial Resource Strain (CARDIA)     Difficulty of Paying Living Expenses: Not hard at all   Food Insecurity: No Food Insecurity (2/18/2025)    Hunger Vital Sign     Worried About Running Out of Food in the Last Year: Never true     Ran Out of Food in the Last Year: Never true   Transportation Needs: No Transportation Needs (2/18/2025)    PRAPARE - Transportation     Lack of Transportation (Medical): No     Lack of Transportation (Non-Medical): No   Physical Activity: Inactive (2/18/2025)    Exercise Vital Sign     Days of  Exercise per Week: 0 days     Minutes of Exercise per Session: 0 min   Stress: Stress Concern Present (2/18/2025)    [employer] - Occupational Stress Questionnaire     Feeling of Stress : To some extent   Housing Stability: Low Risk  (2/18/2025)    Housing Stability Vital Sign     Unable to Pay for Housing in the Last Year: No     Number of Times Moved in the Last Year: 0     Homeless in the Last Year: No   [2]   Current Outpatient Medications:     amiodarone (PACERONE) 200 MG Tab, Take 200 mg by mouth 2 (two) times daily., Disp: , Rfl:     aspirin (ECOTRIN) 81 MG EC tablet, Take 1 tablet by mouth every morning., Disp: , Rfl:     atorvastatin (LIPITOR) 40 MG tablet, Take 1 tablet (40 mg total) by mouth every evening., Disp: 90 tablet, Rfl: 3    B-complex with vitamin C (Z-BEC OR EQUIV) tablet, Take 1 tablet by mouth every morning., Disp: , Rfl:     beta carotene 41335 UNIT capsule, Take 1 capsule by mouth every morning., Disp: , Rfl:     blood sugar diagnostic (TRUE METRIX GLUCOSE TEST STRIP) Strp, USE ONE STRIP FOR TESTING 2 TIMES A DAY, Disp: 200 each, Rfl: 3    blood-glucose meter kit, Use to test twice a day, Disp: 1 each, Rfl: 0    calcium carbonate-vitamin D3 500 mg-10 mcg (400 unit) Tab, Take 1 tablet by mouth once daily., Disp: , Rfl:     carvediloL (COREG) 6.25 MG tablet, Take 1 tablet (6.25 mg total) by mouth 2 (two) times daily with meals., Disp: 180 tablet, Rfl: 3    HYDROcodone-acetaminophen (NORCO) 7.5-325 mg per tablet, Take 1 tablet by mouth 4 (four) times daily as needed., Disp: , Rfl:     insulin aspart U-100 (NOVOLOG) 100 unit/mL (3 mL) InPn pen, Inject before meals with scale:  201-250=+2, 251-300=+3; 301-350=+4, over 350=+5 units. Max daily dose 10 units, Disp: 15 mL, Rfl: 0    lancets (TRUEPLUS LANCETS) 30 gauge Misc, USE ONE LANCET FOR TESTING FOUR TIMES DAILY, Disp: 200 each, Rfl: 5    lancets Misc, 1 lancet by Misc.(Non-Drug; Combo Route) route 4 (four) times  "daily., Disp: 400 each, Rfl: 3    levothyroxine (SYNTHROID) 112 MCG tablet, Take 1 tablet (112 mcg total) by mouth before breakfast., Disp: 90 tablet, Rfl: 3    linaGLIPtin (TRADJENTA) 5 mg Tab tablet, Take 1 tablet (5 mg total) by mouth once daily., Disp: 90 tablet, Rfl: 1    loperamide (IMODIUM) 2 mg capsule, Take 2 mg by mouth once as needed for Diarrhea., Disp: , Rfl:     midodrine (PROAMATINE) 10 MG tablet, Take 1 tablet (10 mg total) by mouth every 8 (eight) hours. Can also take an extra dose with dialysis, Disp: 120 tablet, Rfl: 3    pantoprazole (PROTONIX) 40 MG tablet, Take 1 tablet (40 mg total) by mouth once daily., Disp: 30 tablet, Rfl: 2    pen needle, diabetic (BD ULTRA-FINE MARIS PEN NEEDLE) 32 gauge x 5/32" Ndle, 1 Device by Misc.(Non-Drug; Combo Route) route 2 (two) times a day., Disp: 200 each, Rfl: 3    pregabalin (LYRICA) 75 MG capsule, TAKE ONE CAPSULE BY MOUTH EVERY OTHER DAY, Disp: 45 capsule, Rfl: 3    MELLISSA-BENJY RX 1- mg-mg-mcg Tab, Take 1 tablet by mouth once daily., Disp: 90 tablet, Rfl: 4    sevelamer carbonate (RENVELA) 800 mg Tab, Take 1 tablet (800 mg total) by mouth 3 (three) times daily with meals. ., Disp: 270 tablet, Rfl: 4    torsemide (DEMADEX) 20 MG Tab, Take 1 tablet (20 mg total) by mouth 2 (two) times daily., Disp: 180 tablet, Rfl: 3    valsartan (DIOVAN) 40 MG tablet, Take 0.5 tablets (20 mg total) by mouth 2 (two) times daily., Disp: 30 tablet, Rfl: 11    venlafaxine (EFFEXOR-XR) 37.5 MG 24 hr capsule, Take 1 capsule (37.5 mg total) by mouth once daily., Disp: 90 capsule, Rfl: 3    "

## 2025-03-01 ENCOUNTER — HOSPITAL ENCOUNTER (OUTPATIENT)
Facility: HOSPITAL | Age: 75
Discharge: HOME | End: 2025-03-01
Attending: EMERGENCY MEDICINE | Admitting: EMERGENCY MEDICINE
Payer: MEDICARE

## 2025-03-01 VITALS
OXYGEN SATURATION: 95 % | BODY MASS INDEX: 20.49 KG/M2 | HEART RATE: 62 BPM | HEIGHT: 65 IN | RESPIRATION RATE: 16 BRPM | TEMPERATURE: 98 F | DIASTOLIC BLOOD PRESSURE: 68 MMHG | SYSTOLIC BLOOD PRESSURE: 144 MMHG | WEIGHT: 123 LBS

## 2025-03-01 DIAGNOSIS — N18.6 ESRD (END STAGE RENAL DISEASE) ON DIALYSIS: ICD-10-CM

## 2025-03-01 DIAGNOSIS — Z99.2 ESRD (END STAGE RENAL DISEASE) ON DIALYSIS: ICD-10-CM

## 2025-03-01 DIAGNOSIS — N18.6 ESRD (END STAGE RENAL DISEASE): ICD-10-CM

## 2025-03-01 DIAGNOSIS — R06.02 SOB (SHORTNESS OF BREATH): Primary | ICD-10-CM

## 2025-03-01 LAB
ALBUMIN SERPL BCP-MCNC: 3.1 G/DL (ref 3.5–5.2)
ALP SERPL-CCNC: 149 U/L (ref 40–150)
ALT SERPL W/O P-5'-P-CCNC: 27 U/L (ref 10–44)
ANION GAP SERPL CALC-SCNC: 13 MMOL/L (ref 8–16)
AST SERPL-CCNC: 35 U/L (ref 10–40)
BASOPHILS # BLD AUTO: 0.03 K/UL (ref 0–0.2)
BASOPHILS NFR BLD: 0.3 % (ref 0–1.9)
BILIRUB SERPL-MCNC: 0.3 MG/DL (ref 0.1–1)
BNP SERPL-MCNC: 2551 PG/ML (ref 0–99)
BUN SERPL-MCNC: 47 MG/DL (ref 8–23)
CALCIUM SERPL-MCNC: 9.5 MG/DL (ref 8.7–10.5)
CHLORIDE SERPL-SCNC: 101 MMOL/L (ref 95–110)
CO2 SERPL-SCNC: 21 MMOL/L (ref 23–29)
CREAT SERPL-MCNC: 6.8 MG/DL (ref 0.5–1.4)
DIFFERENTIAL METHOD BLD: ABNORMAL
EOSINOPHIL # BLD AUTO: 0.5 K/UL (ref 0–0.5)
EOSINOPHIL NFR BLD: 4.6 % (ref 0–8)
ERYTHROCYTE [DISTWIDTH] IN BLOOD BY AUTOMATED COUNT: 15.4 % (ref 11.5–14.5)
EST. GFR  (NO RACE VARIABLE): 5.9 ML/MIN/1.73 M^2
GLUCOSE SERPL-MCNC: 145 MG/DL (ref 70–110)
HBV SURFACE AG SERPL QL IA: NORMAL
HCT VFR BLD AUTO: 30.7 % (ref 37–48.5)
HCV AB SERPL QL IA: NORMAL
HGB BLD-MCNC: 9.1 G/DL (ref 12–16)
HIV 1+2 AB+HIV1 P24 AG SERPL QL IA: NORMAL
IMM GRANULOCYTES # BLD AUTO: 0.06 K/UL (ref 0–0.04)
IMM GRANULOCYTES NFR BLD AUTO: 0.5 % (ref 0–0.5)
INFLUENZA A, MOLECULAR: NOT DETECTED
INFLUENZA B, MOLECULAR: NOT DETECTED
LYMPHOCYTES # BLD AUTO: 1 K/UL (ref 1–4.8)
LYMPHOCYTES NFR BLD: 8.6 % (ref 18–48)
MCH RBC QN AUTO: 31.5 PG (ref 27–31)
MCHC RBC AUTO-ENTMCNC: 29.6 G/DL (ref 32–36)
MCV RBC AUTO: 106 FL (ref 82–98)
MONOCYTES # BLD AUTO: 1.5 K/UL (ref 0.3–1)
MONOCYTES NFR BLD: 12.7 % (ref 4–15)
NEUTROPHILS # BLD AUTO: 8.6 K/UL (ref 1.8–7.7)
NEUTROPHILS NFR BLD: 73.3 % (ref 38–73)
NRBC BLD-RTO: 0 /100 WBC
OHS QRS DURATION: 194 MS
OHS QTC CALCULATION: 531 MS
PLATELET # BLD AUTO: 194 K/UL (ref 150–450)
PMV BLD AUTO: 11.9 FL (ref 9.2–12.9)
POTASSIUM SERPL-SCNC: 4.5 MMOL/L (ref 3.5–5.1)
PROT SERPL-MCNC: 7.5 G/DL (ref 6–8.4)
RBC # BLD AUTO: 2.89 M/UL (ref 4–5.4)
RSV AG BY MOLECULAR METHOD: NOT DETECTED
SARS-COV-2 RNA RESP QL NAA+PROBE: NOT DETECTED
SODIUM SERPL-SCNC: 135 MMOL/L (ref 136–145)
TROPONIN I SERPL DL<=0.01 NG/ML-MCNC: 18 NG/L (ref 0–14)
TROPONIN I SERPL DL<=0.01 NG/ML-MCNC: 19 NG/L (ref 0–14)
WBC # BLD AUTO: 11.69 K/UL (ref 3.9–12.7)

## 2025-03-01 PROCEDURE — 86803 HEPATITIS C AB TEST: CPT | Performed by: PHYSICIAN ASSISTANT

## 2025-03-01 PROCEDURE — 87340 HEPATITIS B SURFACE AG IA: CPT | Performed by: HOSPITALIST

## 2025-03-01 PROCEDURE — 0241U SARS-COV2 (COVID) WITH FLU/RSV BY PCR: CPT

## 2025-03-01 PROCEDURE — 63600175 PHARM REV CODE 636 W HCPCS: Performed by: NURSE PRACTITIONER

## 2025-03-01 PROCEDURE — 25000003 PHARM REV CODE 250: Performed by: HOSPITALIST

## 2025-03-01 PROCEDURE — G0257 UNSCHED DIALYSIS ESRD PT HOS: HCPCS

## 2025-03-01 PROCEDURE — G0378 HOSPITAL OBSERVATION PER HR: HCPCS

## 2025-03-01 PROCEDURE — 84484 ASSAY OF TROPONIN QUANT: CPT | Mod: 91

## 2025-03-01 PROCEDURE — 87389 HIV-1 AG W/HIV-1&-2 AB AG IA: CPT | Performed by: PHYSICIAN ASSISTANT

## 2025-03-01 PROCEDURE — 99285 EMERGENCY DEPT VISIT HI MDM: CPT | Mod: 25

## 2025-03-01 PROCEDURE — 83880 ASSAY OF NATRIURETIC PEPTIDE: CPT

## 2025-03-01 PROCEDURE — 85025 COMPLETE CBC W/AUTO DIFF WBC: CPT

## 2025-03-01 PROCEDURE — 80053 COMPREHEN METABOLIC PANEL: CPT

## 2025-03-01 RX ORDER — HEPARIN SODIUM 1000 [USP'U]/ML
1000 INJECTION, SOLUTION INTRAVENOUS; SUBCUTANEOUS ONCE
Status: COMPLETED | OUTPATIENT
Start: 2025-03-01 | End: 2025-03-01

## 2025-03-01 RX ORDER — IBUPROFEN 200 MG
24 TABLET ORAL
Status: DISCONTINUED | OUTPATIENT
Start: 2025-03-01 | End: 2025-03-01 | Stop reason: HOSPADM

## 2025-03-01 RX ORDER — B-COMPLEX WITH VITAMIN C
1 TABLET ORAL EVERY MORNING
Status: DISCONTINUED | OUTPATIENT
Start: 2025-03-01 | End: 2025-03-01 | Stop reason: HOSPADM

## 2025-03-01 RX ORDER — PANTOPRAZOLE SODIUM 40 MG/1
40 TABLET, DELAYED RELEASE ORAL DAILY
Status: DISCONTINUED | OUTPATIENT
Start: 2025-03-01 | End: 2025-03-01 | Stop reason: HOSPADM

## 2025-03-01 RX ORDER — SEVELAMER CARBONATE 800 MG/1
800 TABLET, FILM COATED ORAL
Status: DISCONTINUED | OUTPATIENT
Start: 2025-03-01 | End: 2025-03-01 | Stop reason: HOSPADM

## 2025-03-01 RX ORDER — TORSEMIDE 20 MG/1
20 TABLET ORAL 2 TIMES DAILY
Status: DISCONTINUED | OUTPATIENT
Start: 2025-03-01 | End: 2025-03-01 | Stop reason: HOSPADM

## 2025-03-01 RX ORDER — PSYLLIUM HUSK 0.4 G
1 CAPSULE ORAL DAILY
Status: DISCONTINUED | OUTPATIENT
Start: 2025-03-01 | End: 2025-03-01 | Stop reason: HOSPADM

## 2025-03-01 RX ORDER — ASPIRIN 81 MG/1
81 TABLET ORAL EVERY MORNING
Status: DISCONTINUED | OUTPATIENT
Start: 2025-03-01 | End: 2025-03-01 | Stop reason: HOSPADM

## 2025-03-01 RX ORDER — INSULIN ASPART 100 [IU]/ML
1-10 INJECTION, SOLUTION INTRAVENOUS; SUBCUTANEOUS
Status: DISCONTINUED | OUTPATIENT
Start: 2025-03-01 | End: 2025-03-01 | Stop reason: DRUGHIGH

## 2025-03-01 RX ORDER — CARVEDILOL 3.12 MG/1
6.25 TABLET ORAL 2 TIMES DAILY WITH MEALS
Status: DISCONTINUED | OUTPATIENT
Start: 2025-03-01 | End: 2025-03-01 | Stop reason: HOSPADM

## 2025-03-01 RX ORDER — GLUCAGON 1 MG
1 KIT INJECTION
Status: DISCONTINUED | OUTPATIENT
Start: 2025-03-01 | End: 2025-03-01 | Stop reason: HOSPADM

## 2025-03-01 RX ORDER — VENLAFAXINE HYDROCHLORIDE 37.5 MG/1
37.5 CAPSULE, EXTENDED RELEASE ORAL DAILY
Status: DISCONTINUED | OUTPATIENT
Start: 2025-03-01 | End: 2025-03-01 | Stop reason: HOSPADM

## 2025-03-01 RX ORDER — LEVOTHYROXINE SODIUM 112 UG/1
112 TABLET ORAL
Status: DISCONTINUED | OUTPATIENT
Start: 2025-03-01 | End: 2025-03-01 | Stop reason: HOSPADM

## 2025-03-01 RX ORDER — MIDODRINE HYDROCHLORIDE 5 MG/1
10 TABLET ORAL
Status: DISCONTINUED | OUTPATIENT
Start: 2025-03-01 | End: 2025-03-01 | Stop reason: HOSPADM

## 2025-03-01 RX ORDER — LOPERAMIDE HYDROCHLORIDE 2 MG/1
2 CAPSULE ORAL ONCE AS NEEDED
Status: DISCONTINUED | OUTPATIENT
Start: 2025-03-01 | End: 2025-03-01 | Stop reason: HOSPADM

## 2025-03-01 RX ORDER — AMIODARONE HYDROCHLORIDE 200 MG/1
200 TABLET ORAL 2 TIMES DAILY
Status: DISCONTINUED | OUTPATIENT
Start: 2025-03-01 | End: 2025-03-01 | Stop reason: HOSPADM

## 2025-03-01 RX ORDER — PREGABALIN 75 MG/1
75 CAPSULE ORAL EVERY OTHER DAY
Status: DISCONTINUED | OUTPATIENT
Start: 2025-03-01 | End: 2025-03-01 | Stop reason: HOSPADM

## 2025-03-01 RX ORDER — ATORVASTATIN CALCIUM 40 MG/1
40 TABLET, FILM COATED ORAL NIGHTLY
Status: DISCONTINUED | OUTPATIENT
Start: 2025-03-01 | End: 2025-03-01 | Stop reason: HOSPADM

## 2025-03-01 RX ORDER — HYDROCODONE BITARTRATE AND ACETAMINOPHEN 7.5; 325 MG/1; MG/1
1 TABLET ORAL 4 TIMES DAILY PRN
Refills: 0 | Status: DISCONTINUED | OUTPATIENT
Start: 2025-03-01 | End: 2025-03-01 | Stop reason: HOSPADM

## 2025-03-01 RX ORDER — IBUPROFEN 200 MG
16 TABLET ORAL
Status: DISCONTINUED | OUTPATIENT
Start: 2025-03-01 | End: 2025-03-01 | Stop reason: HOSPADM

## 2025-03-01 RX ORDER — INSULIN ASPART 100 [IU]/ML
0-10 INJECTION, SOLUTION INTRAVENOUS; SUBCUTANEOUS
Status: DISCONTINUED | OUTPATIENT
Start: 2025-03-01 | End: 2025-03-01 | Stop reason: HOSPADM

## 2025-03-01 RX ADMIN — AMIODARONE HYDROCHLORIDE 200 MG: 200 TABLET ORAL at 09:03

## 2025-03-01 RX ADMIN — TORSEMIDE 20 MG: 20 TABLET ORAL at 08:03

## 2025-03-01 RX ADMIN — VALSARTAN 20 MG: 40 TABLET, FILM COATED ORAL at 08:03

## 2025-03-01 RX ADMIN — Medication 1 TABLET: at 08:03

## 2025-03-01 RX ADMIN — LEVOTHYROXINE SODIUM 112 MCG: 112 TABLET ORAL at 08:03

## 2025-03-01 RX ADMIN — SEVELAMER CARBONATE 800 MG: 800 TABLET, FILM COATED ORAL at 11:03

## 2025-03-01 RX ADMIN — PANTOPRAZOLE SODIUM 40 MG: 40 TABLET, DELAYED RELEASE ORAL at 08:03

## 2025-03-01 RX ADMIN — ASPIRIN 81 MG: 81 TABLET, COATED ORAL at 08:03

## 2025-03-01 RX ADMIN — HEPARIN SODIUM 1000 UNITS: 1000 INJECTION, SOLUTION INTRAVENOUS; SUBCUTANEOUS at 06:03

## 2025-03-01 RX ADMIN — VENLAFAXINE HYDROCHLORIDE 37.5 MG: 37.5 CAPSULE, EXTENDED RELEASE ORAL at 09:03

## 2025-03-01 RX ADMIN — CARVEDILOL 6.25 MG: 3.12 TABLET, FILM COATED ORAL at 07:03

## 2025-03-01 RX ADMIN — PREGABALIN 75 MG: 75 CAPSULE ORAL at 08:03

## 2025-03-01 NOTE — ASSESSMENT & PLAN NOTE
Creatine stable for now. BMP reviewed- noted Estimated Creatinine Clearance: 6.4 mL/min (A) (based on SCr of 6.8 mg/dL (H)). according to latest data. Based on current GFR, CKD stage is end stage.  Monitor UOP and serial BMP and adjust therapy as needed. Renally dose meds. Avoid nephrotoxic medications and procedures.    Did not miss HD, but it was re-scheduled due to parades. Suspect mild volume overload causing mild hypoxia, now resolved.     Place in observation  Resume home meds  Consult Nephrology   HD planned here today 2 pm.  May dc later or in am.

## 2025-03-01 NOTE — HPI
Lily Green is a 74-year-old with history of RML carcinoid tumor s/p wedge resection, ESRD on dialysis, and DM presenting due to shortness of breath. Patient states that for the past 2 days she has been having nasal congestion and cough. She has had productive sputum. Denies any blood in her sputum. Denies any fevers. States that she was supposed to get dialysis this Saturday however her dialysis got pushed to Sunday due to the parades. Denies any chest pain, abdominal pain, changes in bowel movements. CXR with pulmonary edema, BNP >2000.  Electrolytes non-emergent, currently oxygenating well on RA.  Nephrology consulted for dialysis while IP

## 2025-03-01 NOTE — HPI
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  Patient states that for the past 2 days she has been having nasal congestion and cough.  She has had productive sputum.  Denies any blood in her sputum.  Denies any fevers.  States that she was supposed to get dialysis this Saturday however her dialysis got pushed to Sunday due to the parades.  Denies any chest pain, abdominal pain, changes in bowel movements.    She is being admitted for HD.   CXR-diffuse reticular opacities increased W/O focal consolidation, afebrile, significantly elevated BNP, electrolytes unremarkable, EKG stable,   Nephrology consulted.

## 2025-03-01 NOTE — DISCHARGE SUMMARY
[facility] - Emergency Dept  Hospital Medicine  Discharge Summary      Patient Name: Lily Green  MRN: [id number]  ANDER: [id number]  Patient Class: OP- Observation  Admission Date: 3/1/2025  Hospital Length of Stay: 0 days  Discharge Date and Time: No discharge date for patient encounter.  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  Patient states that for the past 2 days she has been having nasal congestion and cough.  She has had productive sputum.  Denies any blood in her sputum.  Denies any fevers.  States that she was supposed to get dialysis this Saturday however her dialysis got pushed to Sunday due to the parades.  Denies any chest pain, abdominal pain, changes in bowel movements.    She is being admitted for HD.   CXR-diffuse reticular opacities increased W/O focal consolidation, afebrile, significantly elevated BNP, electrolytes unremarkable, EKG stable,   Nephrology consulted.             * No surgery found *      Hospital Course:   3/1- Gluten free diet started. Cxr-Cardiomegaly with interstitial edema and small pleural effusions. Aeration is mildly worse when compared with the prior exam.  Will get HD today. Spoke to Nephrology PA.  Plan is to keep scheduled HD for tomorrow.  May dc to home if all ok after HD today.      Goals of Care Treatment Preferences:  Code Status: Full Code    Health care agent: Deric Green  Health care agent number: [phone]                   SDOH Screening:  The patient was screened for utility difficulties,  food insecurity, transport difficulties, housing insecurity, and interpersonal safety and there were no concerns identified this admission.     Consults:   Consults (From admission, onward)          Status Ordering Provider     Inpatient consult to Nephrology  Once        Provider:  (Not yet assigned)    EDSON Pelaez            * ESRD (end stage renal disease) on dialysis  Creatine stable for now. BMP reviewed- noted Estimated Creatinine Clearance: 6.4 mL/min (A) (based on SCr of 6.8 mg/dL (H)). according to latest data. Based on current GFR, CKD stage is end stage.  Monitor UOP and serial BMP and adjust therapy as needed. Renally dose meds. Avoid nephrotoxic medications and procedures.    Did not miss HD, but it was re-scheduled due to parades. Suspect mild volume overload causing mild hypoxia, now resolved.     Place in observation  Resume home meds  Consult Nephrology   HD planned here today 2 pm.  May dc later or in am.    HFrEF (heart failure with reduced ejection fraction)  STABLE     ECHO 2/21/25  ft Ventricle: The left ventricle is normal in size. Normal wall thickness. Normal wall motion. There is normal systolic function with a visually estimated ejection fraction of 55 - 60%. Diastolic function cannot be reliably determined in the presence of mitral valve disease.    Right Ventricle: Normal right ventricular cavity size. Wall thickness is normal. Systolic function is normal.    The left atrium is severely dilated.    Aortic Valve: The aortic valve is a trileaflet valve. There is moderate aortic valve sclerosis. Mildly restricted motion. There is mild aortic regurgitation.    Mitral Valve: There is moderate regurgitation with a centrally directed jet.    Tricuspid Valve: There is mild regurgitation.    Aorta: Aortic root is normal in size measuring 3.11 cm. Ascending aorta is at least upper normal in size measuring 3.7 cm, but the ascending aorta is not ideally visualized.    Pulmonary Artery:  "The estimated pulmonary artery systolic pressure is 28 mmHg.    IVC/SVC: Normal venous pressure at 3 mmHg.        Type 2 diabetes mellitus with diabetic polyneuropathy, without long-term current use of insulin  No results for input(s): "POCTGLUCOSE" in the last 72 hours.    Resume SS  Hold po diabetic meds      Malignant carcinoid tumor of bronchus  STABLE      Celiac disease  Started gluten free diet        Final Active Diagnoses:    Diagnosis Date Noted POA    PRINCIPAL PROBLEM:  ESRD (end stage renal disease) on dialysis [N18.6, Z99.2] 01/02/2024 Not Applicable    HFrEF (heart failure with reduced ejection fraction) [I50.20] 05/25/2024 Yes    Type 2 diabetes mellitus with diabetic polyneuropathy, without long-term current use of insulin [E11.42] 10/17/2019 Yes     Chronic    Malignant carcinoid tumor of bronchus [C7A.090] 11/24/2023 Yes    Celiac disease [K90.0] 10/17/2019 Yes     Chronic      Problems Resolved During this Admission:       Discharged Condition: good    Disposition: Home or Self Care    Follow Up:    Patient Instructions:      ACCEPT - Ambulatory referral/consult to Interventional Cardiology   Standing Status: Future   Referral Priority: Routine Referral Type: Consultation   Referral Reason: Specialty Services Required   Requested Specialty: Interventional Cardiology   Number of Visits Requested: 1       Significant Diagnostic Studies: Labs: CMP   Recent Labs   Lab 03/01/25  0325   *   K 4.5      CO2 21*   *   BUN 47*   CREATININE 6.8*   CALCIUM 9.5   PROT 7.5   ALBUMIN 3.1*   BILITOT 0.3   ALKPHOS 149   AST 35   ALT 27   ANIONGAP 13    and CBC   Recent Labs   Lab 03/01/25  0325   WBC 11.69   HGB 9.1*   HCT 30.7*          Pending Diagnostic Studies:       None           Medications:  Reconciled Home Medications:      Medication List        CONTINUE taking these medications      amiodarone 200 MG Tab  Commonly known as: PACERONE  Take 200 mg by mouth 2 (two) times daily.   " "  aspirin 81 MG EC tablet  Commonly known as: ECOTRIN  Take 1 tablet by mouth every morning.     atorvastatin 40 MG tablet  Commonly known as: LIPITOR  Take 1 tablet (40 mg total) by mouth every evening.     beta carotene 48240 UNIT capsule  Take 1 capsule by mouth every morning.     blood sugar diagnostic Strp  Commonly known as: TRUE METRIX GLUCOSE TEST STRIP  USE ONE STRIP FOR TESTING 2 TIMES A DAY     blood-glucose meter kit  Use to test twice a day     calcium carbonate-vitamin D3 500 mg-10 mcg (400 unit) Tab  Take 1 tablet by mouth once daily.     carvediloL 6.25 MG tablet  Commonly known as: COREG  Take 1 tablet (6.25 mg total) by mouth 2 (two) times daily with meals.     insulin aspart U-100 100 unit/mL (3 mL) Inpn pen  Commonly known as: NovoLOG  Inject before meals with scale:  201-250=+2, 251-300=+3; 301-350=+4, over 350=+5 units. Max daily dose 10 units     * lancets Misc  1 lancet by Misc.(Non-Drug; Combo Route) route 4 (four) times daily.     * TRUEPLUS LANCETS 30 gauge Misc  Generic drug: lancets  USE ONE LANCET FOR TESTING FOUR TIMES DAILY     levothyroxine 112 MCG tablet  Commonly known as: SYNTHROID  Take 1 tablet (112 mcg total) by mouth before breakfast.     loperamide 2 mg capsule  Commonly known as: IMODIUM  Take 2 mg by mouth once as needed for Diarrhea.     midodrine 10 MG tablet  Commonly known as: PROAMATINE  Take 1 tablet (10 mg total) by mouth every 8 (eight) hours. Can also take an extra dose with dialysis     pantoprazole 40 MG tablet  Commonly known as: PROTONIX  Take 1 tablet (40 mg total) by mouth once daily.     pen needle, diabetic 32 gauge x 5/32" Ndle  Commonly known as: BD ULTRA-FINE MARIS PEN NEEDLE  1 Device by Misc.(Non-Drug; Combo Route) route 2 (two) times a day.     pregabalin 75 MG capsule  Commonly known as: LYRICA  TAKE ONE CAPSULE BY MOUTH EVERY OTHER DAY     MELLISSA-BENJY RX 1- mg-mg-mcg Tab  Generic drug: vit b cmplx 3-fa-vit c-biotin 1- mg-mg-mcg  Take 1 " tablet by mouth once daily.     sevelamer carbonate 800 mg Tab  Commonly known as: RENVELA  Take 1 tablet (800 mg total) by mouth 3 (three) times daily with meals. .     torsemide 20 MG Tab  Commonly known as: DEMADEX  Take 1 tablet (20 mg total) by mouth 2 (two) times daily.     TRADJENTA 5 mg Tab tablet  Generic drug: linaGLIPtin  Take 1 tablet (5 mg total) by mouth once daily.     valsartan 40 MG tablet  Commonly known as: DIOVAN  Take 0.5 tablets (20 mg total) by mouth 2 (two) times daily.           * This list has 2 medication(s) that are the same as other medications prescribed for you. Read the directions carefully, and ask your doctor or other care provider to review them with you.                ASK your doctor about these medications      B-complex with vitamin C tablet  Commonly known as: Z-Bec or Equiv  Take 1 tablet by mouth every morning.     HYDROcodone-acetaminophen 7.5-325 mg per tablet  Commonly known as: NORCO  Take 1 tablet by mouth 4 (four) times daily as needed.     venlafaxine 37.5 MG 24 hr capsule  Commonly known as: EFFEXOR-XR  Take 1 capsule (37.5 mg total) by mouth once daily.              Indwelling Lines/Drains at time of discharge:   Lines/Drains/Airways       Drain  Duration                  Hemodialysis AV Fistula 09/01/23 0800 Left forearm 547 days                    Time spent on the discharge of patient: 35 minutes         Lesly Reed MD  Department of Hospital Medicine  [facility] - Emergency Dept

## 2025-03-01 NOTE — HOSPITAL COURSE
3/1- Gluten free diet started. Cxr-Cardiomegaly with interstitial edema and small pleural effusions. Aeration is mildly worse when compared with the prior exam.  Will get HD today. Spoke to Nephrology PA.  Plan is to keep scheduled HD for tomorrow.  May dc to home if all ok after HD today.

## 2025-03-01 NOTE — H&P
Je[facility] Dow - Emergency Dept  [facility] Medicine  History & Physical    Patient Name: Lily Green  MRN: [id number]  Patient Class: OP- Observation  Admission Date: 3/1/2025  Attending Physician: Lesly Reed MD  Primary Care Provider: Pavel Calixto MD         Patient information was obtained from patient and ER records.     Subjective:     Principal Problem:ESRD (end stage renal disease) on dialysis    Chief Complaint:   Chief Complaint   Patient presents with    Shortness of Breath     Pt. 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  Patient states that for the past 2 days she has been having nasal congestion and cough.  She has had productive sputum.  Denies any blood in her sputum.  Denies any fevers.  States that she was supposed to get dialysis this Saturday however her dialysis got pushed to  due to the parades.  Denies any chest pain, abdominal pain, changes in bowel movements.    She is being admitted for HD.   CXR-diffuse reticular opacities increased W/O focal consolidation, afebrile, significantly elevated BNP, electrolytes unremarkable, EKG stable,   Nephrology consulted.             Past Medical History:   Diagnosis Date    Anxiety     Celiac disease 2018    Celiac disease     Depression     Diabetes mellitus     Family history of breast cancer in mother      at age 68    Hyperlipidemia     Hypertension     Hypertensive retinopathy of both eyes 2024    Meniere disease     Meniere's disease, unspecified ear     Menopause     Moderate nonproliferative diabetic retinopathy of both eyes 2024    On supplemental  oxygen by nasal cannula 01/28/2023    Hypoxic respiratory failure resolved.  No oxygen desaturation on walk.        Peptic ulcer     Peritonitis 01/14/2023    Pleural effusion on left 01/15/2024    C/w transudate in setting of acute decompensated heart failure due to a-fib and volume overload.  Now appears euvolemic in clinic today, resolved.       Reflux esophagitis     Urinary tract infection     Vaginal infection     Vaginal Pap smear 04/07/2014    Pap/Hpv Negative        Past Surgical History:   Procedure Laterality Date    APPENDECTOMY      BREAST SURGERY      Tags    CARPAL TUNNEL RELEASE Bilateral 09/2017    ,     CLOSURE, COLOSTOMY Left 01/16/2023    Procedure: CLOSURE, COLOSTOMY;  Surgeon: Manuel Javier MD;  Location: [facility] OR;  Service: General;  Laterality: Left;    COLONOSCOPY  04/2018    Normal  ([place])     COLOSTOMY Right 01/16/2023    Procedure: CREATION, COLOSTOMY;  Surgeon: Manuel Javier MD;  Location: [facility] OR;  Service: General;  Laterality: Right;    CORONARY ANGIOGRAPHY N/A 02/21/2024    Procedure: ANGIOGRAM, CORONARY ARTERY;  Surgeon: Todd Carlisle MD;  Location: [facility] CATH LAB;  Service: Cardiology;  Laterality: N/A;    DILATION AND CURETTAGE OF UTERUS  1986    DUPUYTREN CONTRACTURE RELEASE Bilateral 09/2017    ESOPHAGOGASTRODUODENOSCOPY N/A 01/24/2024    Procedure: EGD (ESOPHAGOGASTRODUODENOSCOPY);  Surgeon: Yamilet Walters MD;  Location: [address]);  Service: Gastroenterology;  Laterality: N/A;    ESOPHAGOGASTRODUODENOSCOPY N/A 01/26/2024    Procedure: EGD (ESOPHAGOGASTRODUODENOSCOPY);  Surgeon: Yamilet Walters MD;  Location: [facility] ENDO (2ND FLR);  Service: Gastroenterology;  Laterality: N/A;    HYSTERECTOMY  1987    BEAU w/ appy, no BSO     IMPLANTATION OF LEADLESS PACEMAKER N/A 01/16/2024    Procedure: INSERTION, CARDIAC PACEMAKER, LEADLESS;  Surgeon: LENIN Frances MD;  Location: [facility] EP LAB;  Service: Cardiology;  Laterality: N/A;  SB, LEADLESS PPM  SAM (MICRA), ANES, EH, CVICU [id number]    INJECTION OF NEUROLYTIC AGENT AROUND LUMBAR SYMPATHETIC NERVE N/A 01/06/2022    Procedure: BLOCK, LUMBAR SYMPATHETIC;  Surgeon: Souleymane Rizo Jr., MD;  Location: [facility] PAIN MGT;  Service: Pain Management;  Laterality: N/A;  no pacemaker   pt is diabetic     INJECTION OF NEUROLYTIC AGENT AROUND LUMBAR SYMPATHETIC NERVE N/A 08/25/2022    Procedure: BLOCK, LUMBAR SYMPATHETIC;  Surgeon: Souleymane Rizo Jr., MD;  Location: [facility] PAIN MGT;  Service: Pain Management;  Laterality: N/A;  diabetic     INSERTION OF TUNNELED CENTRAL VENOUS HEMODIALYSIS CATHETER Right 01/25/2023    Procedure: INSERTION, CATHETER, HEMODIALYSIS, DUAL LUMEN;  Surgeon: Manuel Javier MD;  Location: [facility] OR;  Service: General;  Laterality: Right;    INSERTION, CATHETER, TUNNELED Left 01/28/2023    Procedure: Insertion,catheter,tunneled;  Surgeon: Watson Fontenot MD;  Location: [facility] OR;  Service: General;  Laterality: Left;    LAPAROTOMY, EXPLORATORY N/A 01/14/2023    Procedure: LAPAROTOMY, EXPLORATORY;  Surgeon: Manuel Javier MD;  Location: [facility] OR;  Service: General;  Laterality: N/A;    LAPAROTOMY, EXPLORATORY N/A 01/16/2023    Procedure: LAPAROTOMY, EXPLORATORY;  Surgeon: Manuel Javier MD;  Location: [facility] OR;  Service: General;  Laterality: N/A;    LEFT HEART CATHETERIZATION Left 02/21/2024    Procedure: Left heart cath;  Surgeon: Todd Carlisle MD;  Location: [facility] CATH LAB;  Service: Cardiology;  Laterality: Left;    LYMPHADENECTOMY Right 01/02/2024    Procedure: LYMPHADENECTOMY;  Surgeon: Tan Thomson MD;  [address];  Service: Cardiothoracic;  Laterality: Right;    PACEMAKER, TEMPORARY, TRANSVENOUS, PEDIATRIC Right 01/12/2024    Procedure: Pacemaker, Temporary, Transvenous;  Surgeon: Todd Carlisle MD;  Location: [facility] CATH LAB;  Service: Cardiology;  Laterality: Right;    REMOVAL OF CATHETER Right 01/28/2023    Procedure: REMOVAL, CATHETER;  Surgeon:  Watson Fontenot MD;  Location: [facility] OR;  Service: General;  Laterality: Right;    REMOVAL, TUNNELED CATH Right 01/25/2023    Procedure: REMOVAL, TUNNELED CATH;  Surgeon: Manuel Javier MD;  Location: [facility] OR;  Service: General;  Laterality: Right;    ROBOTIC BRONCHOSCOPY N/A 10/20/2023    Procedure: ROBOTIC BRONCHOSCOPY;  Surgeon: Mayda Monzon MD;  [facility];  Service: Pulmonary;  Laterality: N/A;    SHOULDER SURGERY  2009 & 2010    right rotator cuff    THORACOTOMY Right 01/02/2024    Procedure: THORACOTOMY;  Surgeon: Tan Thomson MD;  Location: [facility] OR [facility] FLR;  Service: Cardiothoracic;  Laterality: Right;    THORACOTOMY, WITH INITIAL THERAPEUTIC WEDGE RESECTION OF LUNG Right 01/02/2024    Procedure: THORACOTOMY, WITH INITIAL THERAPEUTIC WEDGE RESECTION OF LUNG;  Surgeon: Tan Thomson MD;  [facility];  Service: Cardiothoracic;  Laterality: Right;    TONSILLECTOMY      UPPER GASTROINTESTINAL ENDOSCOPY  04/2018       Review of patient's allergies indicates:   Allergen Reactions    Crestor [rosuvastatin] Swelling    Gluten protein        No current facility-administered medications on file prior to encounter.     Current Outpatient Medications on File Prior to Encounter   Medication Sig    amiodarone (PACERONE) 200 MG Tab Take 200 mg by mouth 2 (two) times daily.    aspirin (ECOTRIN) 81 MG EC tablet Take 1 tablet by mouth every morning.    atorvastatin (LIPITOR) 40 MG tablet Take 1 tablet (40 mg total) by mouth every evening.    B-complex with vitamin C (Z-BEC OR EQUIV) tablet Take 1 tablet by mouth every morning. (Patient not taking: Reported on 2/27/2025)    beta carotene 77268 UNIT capsule Take 1 capsule by mouth every morning.    blood sugar diagnostic (TRUE METRIX GLUCOSE TEST STRIP) Strp USE ONE STRIP FOR TESTING 2 TIMES A DAY    blood-glucose meter kit Use to test twice a day    calcium carbonate-vitamin D3 500 mg-10 mcg (400 unit) Tab Take 1 tablet by mouth  "once daily.    carvediloL (COREG) 6.25 MG tablet Take 1 tablet (6.25 mg total) by mouth 2 (two) times daily with meals.    HYDROcodone-acetaminophen (NORCO) 7.5-325 mg per tablet Take 1 tablet by mouth 4 (four) times daily as needed. (Patient not taking: Reported on 2/27/2025)    insulin aspart U-100 (NOVOLOG) 100 unit/mL (3 mL) InPn pen Inject before meals with scale:  201-250=+2, 251-300=+3; 301-350=+4, over 350=+5 units. Max daily dose 10 units    lancets (TRUEPLUS LANCETS) 30 gauge Misc USE ONE LANCET FOR TESTING FOUR TIMES DAILY    lancets Misc 1 lancet by Misc.(Non-Drug; Combo Route) route 4 (four) times daily.    levothyroxine (SYNTHROID) 112 MCG tablet Take 1 tablet (112 mcg total) by mouth before breakfast.    linaGLIPtin (TRADJENTA) 5 mg Tab tablet Take 1 tablet (5 mg total) by mouth once daily.    loperamide (IMODIUM) 2 mg capsule Take 2 mg by mouth once as needed for Diarrhea.    midodrine (PROAMATINE) 10 MG tablet Take 1 tablet (10 mg total) by mouth every 8 (eight) hours. Can also take an extra dose with dialysis    pantoprazole (PROTONIX) 40 MG tablet Take 1 tablet (40 mg total) by mouth once daily.    pen needle, diabetic (BD ULTRA-FINE MARIS PEN NEEDLE) 32 gauge x 5/32" Ndle 1 Device by Misc.(Non-Drug; Combo Route) route 2 (two) times a day.    pregabalin (LYRICA) 75 MG capsule TAKE ONE CAPSULE BY MOUTH EVERY OTHER DAY    MELLISSA-BENJY RX 1- mg-mg-mcg Tab Take 1 tablet by mouth once daily.    sevelamer carbonate (RENVELA) 800 mg Tab Take 1 tablet (800 mg total) by mouth 3 (three) times daily with meals. .    torsemide (DEMADEX) 20 MG Tab Take 1 tablet (20 mg total) by mouth 2 (two) times daily.    valsartan (DIOVAN) 40 MG tablet Take 0.5 tablets (20 mg total) by mouth 2 (two) times daily.    venlafaxine (EFFEXOR-XR) 37.5 MG 24 hr capsule Take 1 capsule (37.5 mg total) by mouth once daily. (Patient not taking: Reported on 2/27/2025)     Family History       Problem Relation (Age of Onset)    " Arthritis Father    Breast cancer Mother, Paternal Aunt    Cancer Mother, Paternal Aunt    Diabetes Paternal Grandfather    Hyperlipidemia Sister    Vision loss Father, Mother, Sister          Tobacco Use    Smoking status: Never    Smokeless tobacco: Never   Substance and Sexual Activity    Alcohol use: Not Currently     Comment: Use to drink. Haven't had a drink in the past 10 years.    Drug use: Never    Sexual activity: Not Currently     Partners: Male     Birth control/protection: See Surgical Hx     Comment: :      Review of Systems   Constitutional:  Negative for activity change, appetite change, chills, fatigue, fever and unexpected weight change.   HENT:  Negative for facial swelling, hearing loss, mouth sores, nosebleeds, sinus pressure, sore throat, trouble swallowing and voice change.    Eyes:  Negative for photophobia, pain, redness and itching.   Respiratory:  Negative for cough, choking, shortness of breath and wheezing.    Cardiovascular:  Negative for chest pain, palpitations and leg swelling.   Gastrointestinal:  Negative for abdominal distention, abdominal pain, blood in stool, constipation, diarrhea, nausea and vomiting.   Endocrine: Negative for polyuria.   Genitourinary:  Negative for difficulty urinating, dysuria, flank pain, frequency, hematuria and vaginal bleeding.   Musculoskeletal:  Negative for arthralgias, back pain, gait problem, joint swelling, myalgias, neck pain and neck stiffness.   Skin:  Negative for rash and wound.   Neurological:  Negative for dizziness, tremors, seizures, syncope, weakness, light-headedness, numbness and headaches.   Hematological:  Does not bruise/bleed easily.   Psychiatric/Behavioral:  Negative for agitation, behavioral problems, confusion, decreased concentration, dysphoric mood, hallucinations, sleep disturbance and suicidal ideas. The patient is not nervous/anxious and is not hyperactive.      Objective:     Vital Signs (Most Recent):  Temp: 98.2  °F (36.8 °C) (03/01/25 0550)  Pulse: (!) 56 (03/01/25 0550)  Resp: 18 (03/01/25 0550)  BP: (!) 156/71 (03/01/25 0550)  SpO2: 99 % (03/01/25 0550) Vital Signs (24h Range):  Temp:  [98 °F (36.7 °C)-98.2 °F (36.8 °C)] 98.2 °F (36.8 °C)  Pulse:  [56-60] 56  Resp:  [17-18] 18  SpO2:  [97 %-100 %] 99 %  BP: (120-156)/(63-72) 156/71     Weight: 55.8 kg (123 lb 0.3 oz)  Body mass index is 20.47 kg/m².     Physical Exam  Constitutional:       General: She is not in acute distress.     Appearance: Normal appearance. She is normal weight. She is not ill-appearing, toxic-appearing or diaphoretic.   HENT:      Head: Atraumatic.      Right Ear: External ear normal.      Left Ear: External ear normal.      Nose: Nose normal.      Mouth/Throat:      Mouth: Mucous membranes are moist.      Pharynx: Oropharynx is clear. No posterior oropharyngeal erythema.   Eyes:      General: No scleral icterus.     Extraocular Movements: Extraocular movements intact.      Conjunctiva/sclera: Conjunctivae normal.      Pupils: Pupils are equal, round, and reactive to light.   Neck:      Vascular: No carotid bruit.   Cardiovascular:      Rate and Rhythm: Normal rate and regular rhythm.      Pulses: Normal pulses.      Heart sounds: Normal heart sounds. No murmur heard.     No friction rub. No gallop.   Pulmonary:      Effort: Pulmonary effort is normal. No respiratory distress.      Breath sounds: Normal breath sounds. No stridor. No wheezing, rhonchi or rales.   Chest:      Chest wall: No tenderness.   Abdominal:      General: Abdomen is flat. Bowel sounds are normal. There is no distension.      Palpations: Abdomen is soft. There is no mass.      Tenderness: There is no abdominal tenderness. There is no right CVA tenderness, left CVA tenderness, guarding or rebound.   Musculoskeletal:         General: No swelling, tenderness, deformity or signs of injury. Normal range of motion.      Cervical back: Normal range of motion and neck supple. No  rigidity or tenderness.      Right lower leg: No edema.      Left lower leg: No edema.   Lymphadenopathy:      Cervical: No cervical adenopathy.   Skin:     General: Skin is warm and dry.      Coloration: Skin is not jaundiced or pale.      Findings: No bruising, erythema, lesion or rash.   Neurological:      General: No focal deficit present.      Mental Status: She is alert. Mental status is at baseline.      Cranial Nerves: No cranial nerve deficit.      Sensory: No sensory deficit.      Motor: No weakness.              CRANIAL NERVES     CN III, IV, VI   Pupils are equal, round, and reactive to light.       Significant Labs: All pertinent labs within the past 24 hours have been reviewed.  CBC:   Recent Labs   Lab 03/01/25  0325   WBC 11.69   HGB 9.1*   HCT 30.7*        CMP:   Recent Labs   Lab 03/01/25  0325   *   K 4.5      CO2 21*   *   BUN 47*   CREATININE 6.8*   CALCIUM 9.5   PROT 7.5   ALBUMIN 3.1*   BILITOT 0.3   ALKPHOS 149   AST 35   ALT 27   ANIONGAP 13       Significant Imaging: I have reviewed all pertinent imaging results/findings within the past 24 hours.  Assessment/Plan:     * ESRD (end stage renal disease) on dialysis  Creatine stable for now. BMP reviewed- noted Estimated Creatinine Clearance: 6.4 mL/min (A) (based on SCr of 6.8 mg/dL (H)). according to latest data. Based on current GFR, CKD stage is end stage.  Monitor UOP and serial BMP and adjust therapy as needed. Renally dose meds. Avoid nephrotoxic medications and procedures.    Did not miss HD, but it was re-scheduled due to parades. Suspect mild volume overload causing mild hypoxia, now resolved.     Place in observation  Resume home meds  Consult Nephrology   HD planned here today 2 pm.  May dc later or in am.    HFrEF (heart failure with reduced ejection fraction)  STABLE     ECHO 2/21/25  ft Ventricle: The left ventricle is normal in size. Normal wall thickness. Normal wall motion. There is normal systolic  "function with a visually estimated ejection fraction of 55 - 60%. Diastolic function cannot be reliably determined in the presence of mitral valve disease.    Right Ventricle: Normal right ventricular cavity size. Wall thickness is normal. Systolic function is normal.    The left atrium is severely dilated.    Aortic Valve: The aortic valve is a trileaflet valve. There is moderate aortic valve sclerosis. Mildly restricted motion. There is mild aortic regurgitation.    Mitral Valve: There is moderate regurgitation with a centrally directed jet.    Tricuspid Valve: There is mild regurgitation.    Aorta: Aortic root is normal in size measuring 3.11 cm. Ascending aorta is at least upper normal in size measuring 3.7 cm, but the ascending aorta is not ideally visualized.    Pulmonary Artery: The estimated pulmonary artery systolic pressure is 28 mmHg.    IVC/SVC: Normal venous pressure at 3 mmHg.        Type 2 diabetes mellitus with diabetic polyneuropathy, without long-term current use of insulin  No results for input(s): "POCTGLUCOSE" in the last 72 hours.    Resume SS  Hold po diabetic meds      Malignant carcinoid tumor of bronchus  STABLE      Celiac disease  Started gluten free diet        VTE Risk Mitigation (From admission, onward)      None               On 03/01/2025, patient should be placed in hospital observation services under my care.             Lesly Reed MD  Department of Hospital Medicine  [facility] - Emergency Dept          "

## 2025-03-01 NOTE — SUBJECTIVE & OBJECTIVE
Past Medical History:   Diagnosis Date    Anxiety     Celiac disease 2018    Celiac disease     Depression     Diabetes mellitus     Family history of breast cancer in mother      at age 68    Hyperlipidemia     Hypertension     Hypertensive retinopathy of both eyes 2024    Meniere disease     Meniere's disease, unspecified ear     Menopause     Moderate nonproliferative diabetic retinopathy of both eyes 2024    On supplemental oxygen by nasal cannula 2023    Hypoxic respiratory failure resolved.  No oxygen desaturation on walk.        Peptic ulcer     Peritonitis 2023    Pleural effusion on left 01/15/2024    C/w transudate in setting of acute decompensated heart failure due to a-fib and volume overload.  Now appears euvolemic in clinic today, resolved.       Reflux esophagitis     Urinary tract infection     Vaginal infection     Vaginal Pap smear 2014    Pap/Hpv Negative        Past Surgical History:   Procedure Laterality Date    APPENDECTOMY      BREAST SURGERY      Tags    CARPAL TUNNEL RELEASE Bilateral 2017    ,     CLOSURE, COLOSTOMY Left 2023    Procedure: CLOSURE, COLOSTOMY;  Surgeon: Manuel Javier MD;  Location: [facility] OR;  Service: General;  Laterality: Left;    COLONOSCOPY  2018    Normal  ( Juan A)     COLOSTOMY Right 2023    Procedure: CREATION, COLOSTOMY;  Surgeon: Manuel Javier MD;  Location: [facility] OR;  Service: General;  Laterality: Right;    CORONARY ANGIOGRAPHY N/A 2024    Procedure: ANGIOGRAM, CORONARY ARTERY;  Surgeon: Todd Carlisle MD;  Location: [facility] CATH LAB;  Service: Cardiology;  Laterality: N/A;    DILATION AND CURETTAGE OF UTERUS  1986    DUPUYTREN CONTRACTURE RELEASE Bilateral 2017    ESOPHAGOGASTRODUODENOSCOPY N/A 2024    Procedure: EGD (ESOPHAGOGASTRODUODENOSCOPY);  Surgeon: Yamilet Walters MD;  Location: [facility] ENDO ([address] [place] [address]);  Service: Gastroenterology;  Laterality: N/A;     ESOPHAGOGASTRODUODENOSCOPY N/A 01/26/2024    Procedure: EGD (ESOPHAGOGASTRODUODENOSCOPY);  Surgeon: Yamilet Walters MD;  Location: [facility] ENDO ([address] [place] [address]);  Service: Gastroenterology;  Laterality: N/A;    HYSTERECTOMY  1987    BEAU w/ appy, no BSO     IMPLANTATION OF LEADLESS PACEMAKER N/A 01/16/2024    Procedure: INSERTION, CARDIAC PACEMAKER, LEADLESS;  Surgeon: LENIN Frances MD;  Location: [facility] EP LAB;  Service: Cardiology;  Laterality: N/A;  SB, LEADLESS PPM (MICRA), MDT, ANES, EH, CVICU [id number]    INJECTION OF NEUROLYTIC AGENT AROUND LUMBAR SYMPATHETIC NERVE N/A 01/06/2022    Procedure: BLOCK, LUMBAR SYMPATHETIC;  Surgeon: Souleymane Rizo Jr., MD;  Location: [facility] PAIN MGT;  Service: Pain Management;  Laterality: N/A;  no pacemaker   pt is diabetic     INJECTION OF NEUROLYTIC AGENT AROUND LUMBAR SYMPATHETIC NERVE N/A 08/25/2022    Procedure: BLOCK, LUMBAR SYMPATHETIC;  Surgeon: Solueymane Rizo Jr., MD;  Location: [facility] PAIN MGT;  Service: Pain Management;  Laterality: N/A;  diabetic     INSERTION OF TUNNELED CENTRAL VENOUS HEMODIALYSIS CATHETER Right 01/25/2023    Procedure: INSERTION, CATHETER, HEMODIALYSIS, DUAL LUMEN;  Surgeon: Manuel Javier MD;  Location: [facility] OR;  Service: General;  Laterality: Right;    INSERTION, CATHETER, TUNNELED Left 01/28/2023    Procedure: Insertion,catheter,tunneled;  Surgeon: Watson Fontenot MD;  Location: [facility] OR;  Service: General;  Laterality: Left;    LAPAROTOMY, EXPLORATORY N/A 01/14/2023    Procedure: LAPAROTOMY, EXPLORATORY;  Surgeon: Manuel Javier MD;  Location: [facility] OR;  Service: General;  Laterality: N/A;    LAPAROTOMY, EXPLORATORY N/A 01/16/2023    Procedure: LAPAROTOMY, EXPLORATORY;  Surgeon: Manuel Javier MD;  Location: [facility] OR;  Service: General;  Laterality: N/A;    LEFT HEART CATHETERIZATION Left 02/21/2024    Procedure: Left heart cath;  Surgeon: Todd Carlisle MD;  Location: [facility] CATH LAB;  Service: Cardiology;  Laterality: Left;     LYMPHADENECTOMY Right 01/02/2024    Procedure: LYMPHADENECTOMY;  Surgeon: Tan Thomson MD;  Location: [facility] OR 2ND FLR;  Service: Cardiothoracic;  Laterality: Right;    PACEMAKER, TEMPORARY, TRANSVENOUS, PEDIATRIC Right 01/12/2024    Procedure: Pacemaker, Temporary, Transvenous;  Surgeon: Todd Carlisle MD;  Location: [facility] CATH LAB;  Service: Cardiology;  Laterality: Right;    REMOVAL OF CATHETER Right 01/28/2023    Procedure: REMOVAL, CATHETER;  Surgeon: Watson Fontenot MD;  Location: [facility] OR;  Service: General;  Laterality: Right;    REMOVAL, TUNNELED CATH Right 01/25/2023    Procedure: REMOVAL, TUNNELED CATH;  Surgeon: Manuel Javier MD;  Location: [facility] OR;  Service: General;  Laterality: Right;    ROBOTIC BRONCHOSCOPY N/A 10/20/2023    Procedure: ROBOTIC BRONCHOSCOPY;  Surgeon: Mayda Monzon MD;  [facility];  Service: Pulmonary;  Laterality: N/A;    SHOULDER SURGERY  2009 & 2010    right rotator cuff    THORACOTOMY Right 01/02/2024    Procedure: THORACOTOMY;  Surgeon: Tan Thomson MD;  [facility];  Service: Cardiothoracic;  Laterality: Right;    THORACOTOMY, WITH INITIAL THERAPEUTIC WEDGE RESECTION OF LUNG Right 01/02/2024    Procedure: THORACOTOMY, WITH INITIAL THERAPEUTIC WEDGE RESECTION OF LUNG;  Surgeon: Tan Thomson MD;  [facility];  Service: Cardiothoracic;  Laterality: Right;    TONSILLECTOMY      UPPER GASTROINTESTINAL ENDOSCOPY  04/2018       Review of patient's allergies indicates:   Allergen Reactions    Crestor [rosuvastatin] Swelling    Gluten protein      Current Facility-Administered Medications   Medication Frequency    amiodarone tablet 200 mg BID    aspirin EC tablet 81 mg QAM    atorvastatin tablet 40 mg QHS    B-complex with vitamin C tablet 1 tablet QAM    calcium-vitamin D3 500 mg-5 mcg (200 unit) per tablet 1 tablet Daily    carvediloL tablet 6.25 mg BID WM    dextrose 50% injection 12.5 g PRN    dextrose 50%  "injection 25 g PRN    glucagon (human recombinant) injection 1 mg PRN    glucose chewable tablet 16 g PRN    glucose chewable tablet 24 g PRN    HYDROcodone-acetaminophen 7.5-325 mg per tablet 1 tablet QID PRN    insulin aspart U-100 pen 0-10 Units QID (AC + HS) PRN    levothyroxine tablet 112 mcg Before breakfast    loperamide capsule 2 mg Once PRN    midodrine tablet 10 mg TID WM    midodrine tablet 10 mg PRN    pantoprazole EC tablet 40 mg Daily    pregabalin capsule 75 mg Every other day    sevelamer carbonate tablet 800 mg TID WM    torsemide tablet 20 mg BID    valsartan split tablet 20 mg BID    venlafaxine 24 hr capsule 37.5 mg Daily     Current Outpatient Medications   Medication    amiodarone (PACERONE) 200 MG Tab    aspirin (ECOTRIN) 81 MG EC tablet    atorvastatin (LIPITOR) 40 MG tablet    B-complex with vitamin C (Z-BEC OR EQUIV) tablet    beta carotene 13852 UNIT capsule    blood sugar diagnostic (TRUE METRIX GLUCOSE TEST STRIP) Strp    blood-glucose meter kit    calcium carbonate-vitamin D3 500 mg-10 mcg (400 unit) Tab    carvediloL (COREG) 6.25 MG tablet    HYDROcodone-acetaminophen (NORCO) 7.5-325 mg per tablet    insulin aspart U-100 (NOVOLOG) 100 unit/mL (3 mL) InPn pen    lancets (TRUEPLUS LANCETS) 30 gauge Misc    lancets Misc    levothyroxine (SYNTHROID) 112 MCG tablet    linaGLIPtin (TRADJENTA) 5 mg Tab tablet    loperamide (IMODIUM) 2 mg capsule    midodrine (PROAMATINE) 10 MG tablet    pantoprazole (PROTONIX) 40 MG tablet    pen needle, diabetic (BD ULTRA-FINE MARIS PEN NEEDLE) 32 gauge x 5/32" Ndle    pregabalin (LYRICA) 75 MG capsule    MELLISSA-BENJY RX 1- mg-mg-mcg Tab    sevelamer carbonate (RENVELA) 800 mg Tab    torsemide (DEMADEX) 20 MG Tab    valsartan (DIOVAN) 40 MG tablet    venlafaxine (EFFEXOR-XR) 37.5 MG 24 hr capsule     Family History       Problem Relation (Age of Onset)    Arthritis Father    Breast cancer Mother, Paternal Aunt    Cancer Mother, Paternal Aunt    Diabetes " Paternal Grandfather    Hyperlipidemia Sister    Vision loss Father, Mother, Sister          Tobacco Use    Smoking status: Never    Smokeless tobacco: Never   Substance and Sexual Activity    Alcohol use: Not Currently     Comment: Use to drink. Haven't had a drink in the past 10 years.    Drug use: Never    Sexual activity: Not Currently     Partners: Male     Birth control/protection: See Surgical Hx     Comment: :      Review of Systems   Constitutional:  Negative for activity change, appetite change, chills, fatigue, fever and unexpected weight change.   HENT:  Negative for facial swelling, hearing loss, mouth sores, nosebleeds, sinus pressure, sore throat, trouble swallowing and voice change.    Eyes:  Negative for photophobia, pain, redness and itching.   Respiratory:  Positive for shortness of breath. Negative for cough, choking and wheezing.    Cardiovascular:  Negative for chest pain, palpitations and leg swelling.   Gastrointestinal:  Negative for abdominal distention, abdominal pain, blood in stool, constipation, diarrhea, nausea and vomiting.   Endocrine: Negative for polyuria.   Genitourinary:  Negative for difficulty urinating, dysuria, flank pain, frequency, hematuria and vaginal bleeding.   Musculoskeletal:  Negative for arthralgias, back pain, gait problem, joint swelling, myalgias, neck pain and neck stiffness.   Skin:  Negative for rash and wound.   Neurological:  Negative for dizziness, tremors, seizures, syncope, weakness, light-headedness, numbness and headaches.   Hematological:  Does not bruise/bleed easily.   Psychiatric/Behavioral:  Negative for agitation, behavioral problems, confusion, decreased concentration, dysphoric mood, hallucinations, sleep disturbance and suicidal ideas. The patient is not nervous/anxious and is not hyperactive.      Objective:     Vital Signs (Most Recent):  Temp: 97.8 °F (36.6 °C) (03/01/25 1145)  Pulse: (!) 56 (03/01/25 1145)  Resp: 17 (03/01/25  1145)  BP: 134/61 (03/01/25 1145)  SpO2: 99 % (03/01/25 1145) Vital Signs (24h Range):  Temp:  [97.8 °F (36.6 °C)-98.2 °F (36.8 °C)] 97.8 °F (36.6 °C)  Pulse:  [55-60] 56  Resp:  [17-18] 17  SpO2:  [97 %-100 %] 99 %  BP: (120-156)/(61-72) 134/61     Weight: 55.8 kg (123 lb 0.3 oz) (03/01/25 0550)  Body mass index is 20.47 kg/m².  Body surface area is 1.6 meters squared.    No intake/output data recorded.     Physical Exam  Vitals and nursing note reviewed.   Constitutional:       General: She is not in acute distress.     Appearance: Normal appearance. She is not ill-appearing or toxic-appearing.   HENT:      Head: Normocephalic and atraumatic.      Nose: No congestion or rhinorrhea.      Mouth/Throat:      Mouth: Mucous membranes are moist.      Pharynx: Oropharynx is clear.   Eyes:      General: No scleral icterus.        Right eye: No discharge.         Left eye: No discharge.      Extraocular Movements: Extraocular movements intact.      Conjunctiva/sclera: Conjunctivae normal.   Cardiovascular:      Rate and Rhythm: Regular rhythm. Bradycardia present.      Heart sounds: No murmur heard.     No friction rub. No gallop.   Pulmonary:      Effort: Pulmonary effort is normal. No respiratory distress.      Breath sounds: Rales present. No wheezing.   Abdominal:      General: There is no distension.      Palpations: Abdomen is soft.      Tenderness: There is no abdominal tenderness.   Musculoskeletal:         General: No swelling.      Cervical back: Normal range of motion and neck supple.      Right lower leg: No edema.      Left lower leg: No edema.   Skin:     General: Skin is warm and dry.   Neurological:      General: No focal deficit present.      Mental Status: She is alert and oriented to person, place, and time.   Psychiatric:         Mood and Affect: Mood normal.         Behavior: Behavior normal.          Significant Labs:  CBC:   Recent Labs   Lab 03/01/25  0325   WBC 11.69   RBC 2.89*   HGB 9.1*   HCT  30.7*      *   MCH 31.5*   MCHC 29.6*     CMP:   Recent Labs   Lab 03/01/25  0325   *   CALCIUM 9.5   ALBUMIN 3.1*   PROT 7.5   *   K 4.5   CO2 21*      BUN 47*   CREATININE 6.8*   ALKPHOS 149   ALT 27   AST 35   BILITOT 0.3

## 2025-03-01 NOTE — ED NOTES
Assumed care for pt after recieving report from ARABELLA Shearer. Pt. resting in bed. Pt. offered bathroom assistance and denies need at this time. Explanation of care/wait provided. Pt verbalizes no needs at this time. Bed in low, locked position with rails up and call bell in reach. Pt's white board updated with today's care team and plan.

## 2025-03-01 NOTE — SUBJECTIVE & OBJECTIVE
Past Medical History:   Diagnosis Date    Anxiety     Celiac disease 2018    Celiac disease     Depression     Diabetes mellitus     Family history of breast cancer in mother      at age 68    Hyperlipidemia     Hypertension     Hypertensive retinopathy of both eyes 2024    Meniere disease     Meniere's disease, unspecified ear     Menopause     Moderate nonproliferative diabetic retinopathy of both eyes 2024    On supplemental oxygen by nasal cannula 2023    Hypoxic respiratory failure resolved.  No oxygen desaturation on walk.        Peptic ulcer     Peritonitis 2023    Pleural effusion on left 01/15/2024    C/w transudate in setting of acute decompensated heart failure due to a-fib and volume overload.  Now appears euvolemic in clinic today, resolved.       Reflux esophagitis     Urinary tract infection     Vaginal infection     Vaginal Pap smear 2014    Pap/Hpv Negative        Past Surgical History:   Procedure Laterality Date    APPENDECTOMY      BREAST SURGERY      Tags    CARPAL TUNNEL RELEASE Bilateral 2017    ,     CLOSURE, COLOSTOMY Left 2023    Procedure: CLOSURE, COLOSTOMY;  Surgeon: Manuel Javier MD;  Location: [facility] OR;  Service: General;  Laterality: Left;    COLONOSCOPY  2018    Normal  ( Juan A)     COLOSTOMY Right 2023    Procedure: CREATION, COLOSTOMY;  Surgeon: Manuel Javier MD;  Location: [facility] OR;  Service: General;  Laterality: Right;    CORONARY ANGIOGRAPHY N/A 2024    Procedure: ANGIOGRAM, CORONARY ARTERY;  Surgeon: Todd Carlisle MD;  Location: [facility] CATH LAB;  Service: Cardiology;  Laterality: N/A;    DILATION AND CURETTAGE OF UTERUS  1986    DUPUYTREN CONTRACTURE RELEASE Bilateral 2017    ESOPHAGOGASTRODUODENOSCOPY N/A 2024    Procedure: EGD (ESOPHAGOGASTRODUODENOSCOPY);  Surgeon: Yamilet Walters MD;  Location: [facility] ENDO ([address] [place] [address]);  Service: Gastroenterology;  Laterality: N/A;     ESOPHAGOGASTRODUODENOSCOPY N/A 01/26/2024    Procedure: EGD (ESOPHAGOGASTRODUODENOSCOPY);  Surgeon: Yamilet Walters MD;  Location: [facility] ([address] [place] [address]);  Service: Gastroenterology;  Laterality: N/A;    HYSTERECTOMY  1987    BEAU w/ appy, no BSO     IMPLANTATION OF LEADLESS PACEMAKER N/A 01/16/2024    Procedure: INSERTION, CARDIAC PACEMAKER, LEADLESS;  Surgeon: LENIN Frances MD;  Location: [facility] EP LAB;  Service: Cardiology;  Laterality: N/A;  SB, LEADLESS PPM (MICRA), MDT, ANES, EH, CVICU [id number]    INJECTION OF NEUROLYTIC AGENT AROUND LUMBAR SYMPATHETIC NERVE N/A 01/06/2022    Procedure: BLOCK, LUMBAR SYMPATHETIC;  Surgeon: Souleymane Rizo Jr., MD;  Location: [facility] PAIN MGT;  Service: Pain Management;  Laterality: N/A;  no pacemaker   pt is diabetic     INJECTION OF NEUROLYTIC AGENT AROUND LUMBAR SYMPATHETIC NERVE N/A 08/25/2022    Procedure: BLOCK, LUMBAR SYMPATHETIC;  Surgeon: Souleymane Rizo Jr., MD;  Location: [facility] PAIN MGT;  Service: Pain Management;  Laterality: N/A;  diabetic     INSERTION OF TUNNELED CENTRAL VENOUS HEMODIALYSIS CATHETER Right 01/25/2023    Procedure: INSERTION, CATHETER, HEMODIALYSIS, DUAL LUMEN;  Surgeon: Manuel Javier MD;  Location: [facility] OR;  Service: General;  Laterality: Right;    INSERTION, CATHETER, TUNNELED Left 01/28/2023    Procedure: Insertion,catheter,tunneled;  Surgeon: Watson Fontenot MD;  Location: [facility] OR;  Service: General;  Laterality: Left;    LAPAROTOMY, EXPLORATORY N/A 01/14/2023    Procedure: LAPAROTOMY, EXPLORATORY;  Surgeon: Manuel Javier MD;  Location: [facility] OR;  Service: General;  Laterality: N/A;    LAPAROTOMY, EXPLORATORY N/A 01/16/2023    Procedure: LAPAROTOMY, EXPLORATORY;  Surgeon: Manuel Javier MD;  Location: [facility] OR;  Service: General;  Laterality: N/A;    LEFT HEART CATHETERIZATION Left 02/21/2024    Procedure: Left heart cath;  Surgeon: Todd Carlisle MD;  Location: [facility] CATH LAB;  Service: Cardiology;  Laterality: Left;     LYMPHADENECTOMY Right 01/02/2024    Procedure: LYMPHADENECTOMY;  Surgeon: Tan Thomson MD;  Location: [facility] OR [facility] FLR;  Service: Cardiothoracic;  Laterality: Right;    PACEMAKER, TEMPORARY, TRANSVENOUS, PEDIATRIC Right 01/12/2024    Procedure: Pacemaker, Temporary, Transvenous;  Surgeon: Todd Carlisle MD;  Location: [facility] CATH LAB;  Service: Cardiology;  Laterality: Right;    REMOVAL OF CATHETER Right 01/28/2023    Procedure: REMOVAL, CATHETER;  Surgeon: Watson Fontenot MD;  Location: [facility] OR;  Service: General;  Laterality: Right;    REMOVAL, TUNNELED CATH Right 01/25/2023    Procedure: REMOVAL, TUNNELED CATH;  Surgeon: Manuel Javier MD;  Location: [facility] OR;  Service: General;  Laterality: Right;    ROBOTIC BRONCHOSCOPY N/A 10/20/2023    Procedure: ROBOTIC BRONCHOSCOPY;  Surgeon: Mayda Monzon MD;  [facility];  Service: Pulmonary;  Laterality: N/A;    SHOULDER SURGERY  2009 & 2010    right rotator cuff    THORACOTOMY Right 01/02/2024    Procedure: THORACOTOMY;  Surgeon: Tan Thomson MD;  [facility];  Service: Cardiothoracic;  Laterality: Right;    THORACOTOMY, WITH INITIAL THERAPEUTIC WEDGE RESECTION OF LUNG Right 01/02/2024    Procedure: THORACOTOMY, WITH INITIAL THERAPEUTIC WEDGE RESECTION OF LUNG;  Surgeon: Tan Thomson MD;  [facility];  Service: Cardiothoracic;  Laterality: Right;    TONSILLECTOMY      UPPER GASTROINTESTINAL ENDOSCOPY  04/2018       Review of patient's allergies indicates:   Allergen Reactions    Crestor [rosuvastatin] Swelling    Gluten protein        No current facility-administered medications on file prior to encounter.     Current Outpatient Medications on File Prior to Encounter   Medication Sig    amiodarone (PACERONE) 200 MG Tab Take 200 mg by mouth 2 (two) times daily.    aspirin (ECOTRIN) 81 MG EC tablet Take 1 tablet by mouth every morning.    atorvastatin (LIPITOR) 40 MG tablet Take 1 tablet (40 mg total)  "by mouth every evening.    B-complex with vitamin C (Z-BEC OR EQUIV) tablet Take 1 tablet by mouth every morning. (Patient not taking: Reported on 2/27/2025)    beta carotene 99394 UNIT capsule Take 1 capsule by mouth every morning.    blood sugar diagnostic (TRUE METRIX GLUCOSE TEST STRIP) Strp USE ONE STRIP FOR TESTING 2 TIMES A DAY    blood-glucose meter kit Use to test twice a day    calcium carbonate-vitamin D3 500 mg-10 mcg (400 unit) Tab Take 1 tablet by mouth once daily.    carvediloL (COREG) 6.25 MG tablet Take 1 tablet (6.25 mg total) by mouth 2 (two) times daily with meals.    HYDROcodone-acetaminophen (NORCO) 7.5-325 mg per tablet Take 1 tablet by mouth 4 (four) times daily as needed. (Patient not taking: Reported on 2/27/2025)    insulin aspart U-100 (NOVOLOG) 100 unit/mL (3 mL) InPn pen Inject before meals with scale:  201-250=+2, 251-300=+3; 301-350=+4, over 350=+5 units. Max daily dose 10 units    lancets (TRUEPLUS LANCETS) 30 gauge Misc USE ONE LANCET FOR TESTING FOUR TIMES DAILY    lancets Misc 1 lancet by Misc.(Non-Drug; Combo Route) route 4 (four) times daily.    levothyroxine (SYNTHROID) 112 MCG tablet Take 1 tablet (112 mcg total) by mouth before breakfast.    linaGLIPtin (TRADJENTA) 5 mg Tab tablet Take 1 tablet (5 mg total) by mouth once daily.    loperamide (IMODIUM) 2 mg capsule Take 2 mg by mouth once as needed for Diarrhea.    midodrine (PROAMATINE) 10 MG tablet Take 1 tablet (10 mg total) by mouth every 8 (eight) hours. Can also take an extra dose with dialysis    pantoprazole (PROTONIX) 40 MG tablet Take 1 tablet (40 mg total) by mouth once daily.    pen needle, diabetic (BD ULTRA-FINE MARIS PEN NEEDLE) 32 gauge x 5/32" Ndle 1 Device by Misc.(Non-Drug; Combo Route) route 2 (two) times a day.    pregabalin (LYRICA) 75 MG capsule TAKE ONE CAPSULE BY MOUTH EVERY OTHER DAY    MELLISSA-BENJY RX 1- mg-mg-mcg Tab Take 1 tablet by mouth once daily.    sevelamer carbonate (RENVELA) 800 mg Tab " Take 1 tablet (800 mg total) by mouth 3 (three) times daily with meals. .    torsemide (DEMADEX) 20 MG Tab Take 1 tablet (20 mg total) by mouth 2 (two) times daily.    valsartan (DIOVAN) 40 MG tablet Take 0.5 tablets (20 mg total) by mouth 2 (two) times daily.    venlafaxine (EFFEXOR-XR) 37.5 MG 24 hr capsule Take 1 capsule (37.5 mg total) by mouth once daily. (Patient not taking: Reported on 2/27/2025)     Family History       Problem Relation (Age of Onset)    Arthritis Father    Breast cancer Mother, Paternal Aunt    Cancer Mother, Paternal Aunt    Diabetes Paternal Grandfather    Hyperlipidemia Sister    Vision loss Father, Mother, Sister          Tobacco Use    Smoking status: Never    Smokeless tobacco: Never   Substance and Sexual Activity    Alcohol use: Not Currently     Comment: Use to drink. Haven't had a drink in the past 10 years.    Drug use: Never    Sexual activity: Not Currently     Partners: Male     Birth control/protection: See Surgical Hx     Comment: :      Review of Systems   Constitutional:  Negative for activity change, appetite change, chills, fatigue, fever and unexpected weight change.   HENT:  Negative for facial swelling, hearing loss, mouth sores, nosebleeds, sinus pressure, sore throat, trouble swallowing and voice change.    Eyes:  Negative for photophobia, pain, redness and itching.   Respiratory:  Negative for cough, choking, shortness of breath and wheezing.    Cardiovascular:  Negative for chest pain, palpitations and leg swelling.   Gastrointestinal:  Negative for abdominal distention, abdominal pain, blood in stool, constipation, diarrhea, nausea and vomiting.   Endocrine: Negative for polyuria.   Genitourinary:  Negative for difficulty urinating, dysuria, flank pain, frequency, hematuria and vaginal bleeding.   Musculoskeletal:  Negative for arthralgias, back pain, gait problem, joint swelling, myalgias, neck pain and neck stiffness.   Skin:  Negative for rash and  wound.   Neurological:  Negative for dizziness, tremors, seizures, syncope, weakness, light-headedness, numbness and headaches.   Hematological:  Does not bruise/bleed easily.   Psychiatric/Behavioral:  Negative for agitation, behavioral problems, confusion, decreased concentration, dysphoric mood, hallucinations, sleep disturbance and suicidal ideas. The patient is not nervous/anxious and is not hyperactive.      Objective:     Vital Signs (Most Recent):  Temp: 98.2 °F (36.8 °C) (03/01/25 0550)  Pulse: (!) 56 (03/01/25 0550)  Resp: 18 (03/01/25 0550)  BP: (!) 156/71 (03/01/25 0550)  SpO2: 99 % (03/01/25 0550) Vital Signs (24h Range):  Temp:  [98 °F (36.7 °C)-98.2 °F (36.8 °C)] 98.2 °F (36.8 °C)  Pulse:  [56-60] 56  Resp:  [17-18] 18  SpO2:  [97 %-100 %] 99 %  BP: (120-156)/(63-72) 156/71     Weight: 55.8 kg (123 lb 0.3 oz)  Body mass index is 20.47 kg/m².     Physical Exam  Constitutional:       General: She is not in acute distress.     Appearance: Normal appearance. She is normal weight. She is not ill-appearing, toxic-appearing or diaphoretic.   HENT:      Head: Atraumatic.      Right Ear: External ear normal.      Left Ear: External ear normal.      Nose: Nose normal.      Mouth/Throat:      Mouth: Mucous membranes are moist.      Pharynx: Oropharynx is clear. No posterior oropharyngeal erythema.   Eyes:      General: No scleral icterus.     Extraocular Movements: Extraocular movements intact.      Conjunctiva/sclera: Conjunctivae normal.      Pupils: Pupils are equal, round, and reactive to light.   Neck:      Vascular: No carotid bruit.   Cardiovascular:      Rate and Rhythm: Normal rate and regular rhythm.      Pulses: Normal pulses.      Heart sounds: Normal heart sounds. No murmur heard.     No friction rub. No gallop.   Pulmonary:      Effort: Pulmonary effort is normal. No respiratory distress.      Breath sounds: Normal breath sounds. No stridor. No wheezing, rhonchi or rales.   Chest:      Chest wall:  No tenderness.   Abdominal:      General: Abdomen is flat. Bowel sounds are normal. There is no distension.      Palpations: Abdomen is soft. There is no mass.      Tenderness: There is no abdominal tenderness. There is no right CVA tenderness, left CVA tenderness, guarding or rebound.   Musculoskeletal:         General: No swelling, tenderness, deformity or signs of injury. Normal range of motion.      Cervical back: Normal range of motion and neck supple. No rigidity or tenderness.      Right lower leg: No edema.      Left lower leg: No edema.   Lymphadenopathy:      Cervical: No cervical adenopathy.   Skin:     General: Skin is warm and dry.      Coloration: Skin is not jaundiced or pale.      Findings: No bruising, erythema, lesion or rash.   Neurological:      General: No focal deficit present.      Mental Status: She is alert. Mental status is at baseline.      Cranial Nerves: No cranial nerve deficit.      Sensory: No sensory deficit.      Motor: No weakness.              CRANIAL NERVES     CN III, IV, VI   Pupils are equal, round, and reactive to light.       Significant Labs: All pertinent labs within the past 24 hours have been reviewed.  CBC:   Recent Labs   Lab 03/01/25  0325   WBC 11.69   HGB 9.1*   HCT 30.7*        CMP:   Recent Labs   Lab 03/01/25  0325   *   K 4.5      CO2 21*   *   BUN 47*   CREATININE 6.8*   CALCIUM 9.5   PROT 7.5   ALBUMIN 3.1*   BILITOT 0.3   ALKPHOS 149   AST 35   ALT 27   ANIONGAP 13       Significant Imaging: I have reviewed all pertinent imaging results/findings within the past 24 hours.

## 2025-03-01 NOTE — ASSESSMENT & PLAN NOTE
"No results for input(s): "POCTGLUCOSE" in the last 72 hours.    Resume SS  Hold po diabetic meds    "

## 2025-03-01 NOTE — ASSESSMENT & PLAN NOTE
ESRD on iHD MWF  Magdalene Vyas  3:15  EDW:  55.5    Today's weight 56.5 kg.  Based off CXR and BNP, will attempt to decrease EDW to 54.5 kg.   Often has hypotensive episodes with dialysis, uses midodrine PRN, UF profile    -seen on HD, tolerating well  -midodrine ordered  -dialysate bath as been adjusted to current labs  -strict I/O's  -if feeling better after UF, would recommend discharge so she can go to her scheduled HD session tomorrow for additional UF if needed.   Sleepy

## 2025-03-01 NOTE — NURSING
Patient arrived in stretcher . Both lumens of right Right thigh HD  catheter flushes good . Dialysis treatment  started.

## 2025-03-01 NOTE — PLAN OF CARE
[facility] - Emergency Dept  Initial Discharge Assessment       Primary Care Provider: Pavel Calixto MD    Admission Diagnosis: SOB (shortness of breath) [R06.02]    Admission Date: 3/1/2025  Expected Discharge Date:     Pt stated she uses a rollator to assist with ambulation and is independent with her ADL's.  Pt stated she has a sitter 7days/week for 4hrs/day and does not have any post acute needs.    Pt to d/c home with no needs when ready    Transition of Care Barriers: (P) None    Payor: [employer] MANAGED MEDICARE / Plan: [employer] MEDICARE PPO / Product Type: Medicare Advantage /     Extended Emergency Contact Information  Primary Emergency Contact: Deric Green   [place]  Mobile Phone: [phone]  Relation: Spouse  Secondary Emergency Contact: Sweetie Saha   [facility]  Home Phone: [phone]  Mobile Phone: [phone]  Relation: Sister    Discharge Plan A: (P) Home  Discharge Plan B: (P) Home      [id number] - [facility] [address] AT [facility] [address]   [address]  [facility] [address]  Phone: [phone] Fax: [phone]      Initial Assessment (most recent)       Adult Discharge Assessment - 03/01/25 0902          Discharge Assessment    Assessment Type Discharge Planning Assessment (P)      Confirmed/corrected address, phone number and insurance Yes (P)      Confirmed Demographics Correct on Facesheet (P)      Source of Information patient (P)      People in Home alone (P)      Facility Arrived From: home (P)      Do you expect to return to your current living situation? Yes (P)      Do you have help at home or someone to help you manage your care at home? Yes (P)      Who are your caregiver(s) and their phone number(s)? sitter services 7days/week for 4hrs/day (P)      Prior to hospitilization cognitive status: Alert/Oriented;No Deficits (P)      Current cognitive status: No Deficits;Alert/Oriented (P)      Walking or  Climbing Stairs Difficulty yes (P)      Walking or Climbing Stairs ambulation difficulty, requires equipment (P)      Mobility Management rollator (P)      Dressing/Bathing Difficulty no (P)      Home Accessibility wheelchair accessible (P)      Home Layout Able to live on 1st floor (P)      Equipment Currently Used at Home rollator (P)      Patient currently being followed by outpatient case management? No (P)      Do you have any problems affording any of your prescribed medications? No (P)      Is the patient taking medications as prescribed? yes (P)      Who is going to help you get home at discharge? family/friends (P)      How do you get to doctors appointments? family or friend will provide (P)      Are you on dialysis? Yes (P)      Dialysis Name and Scheduled days T, TH, Sat [facility] at 1045hrs (P)      Do you take coumadin? No (P)      Discharge Plan A Home (P)      Discharge Plan B Home (P)      DME Needed Upon Discharge  none (P)      Discharge Plan discussed with: Patient (P)      Transition of Care Barriers None (P)         Physical Activity    On average, how many days per week do you engage in moderate to strenuous exercise (like a brisk walk)? 0 days (P)      On average, how many minutes do you engage in exercise at this level? 0 min (P)         Financial Resource Strain    How hard is it for you to pay for the very basics like food, housing, medical care, and heating? Not very hard (P)         Housing Stability    In the last 12 months, was there a time when you were not able to pay the mortgage or rent on time? No (P)      At any time in the past 12 months, were you homeless or living in a shelter (including now)? No (P)         Transportation Needs    Has the lack of transportation kept you from medical appointments, meetings, work or from getting things needed for daily living? No (P)         Food Insecurity    Within the past 12 months, you worried that your food would run out before  you got the money to buy more. Never true (P)      Within the past 12 months, the food you bought just didn't last and you didn't have money to get more. Never true (P)         Stress    Do you feel stress - tense, restless, nervous, or anxious, or unable to sleep at night because your mind is troubled all the time - these days? To some extent (P)         Social Isolation    How often do you feel lonely or isolated from those around you?  Rarely (P)         Alcohol Use    Q1: How often do you have a drink containing alcohol? Never (P)      Q2: How many drinks containing alcohol do you have on a typical day when you are drinking? Patient does not drink (P)      Q3: How often do you have six or more drinks on one occasion? Never (P)         Utilities    In the past 12 months has the electric, gas, oil, or water company threatened to shut off services in your home? No (P)         Health Literacy    How often do you need to have someone help you when you read instructions, pamphlets, or other written material from your doctor or pharmacy? Rarely (P)                    Tess Louis CD, MSW, LMSW, RSW   Case Management  [facility]  Email: [email or web address]

## 2025-03-01 NOTE — Clinical Note
Diagnosis: SOB (shortness of breath) [483859]   Future Attending Provider: SUNDEEP QUINN [[id number]]   Is the patient being sent to ED Observation?: No   Special Needs:: No Special Needs [1]

## 2025-03-01 NOTE — ASSESSMENT & PLAN NOTE
STABLE     ECHO 2/21/25  ft Ventricle: The left ventricle is normal in size. Normal wall thickness. Normal wall motion. There is normal systolic function with a visually estimated ejection fraction of 55 - 60%. Diastolic function cannot be reliably determined in the presence of mitral valve disease.    Right Ventricle: Normal right ventricular cavity size. Wall thickness is normal. Systolic function is normal.    The left atrium is severely dilated.    Aortic Valve: The aortic valve is a trileaflet valve. There is moderate aortic valve sclerosis. Mildly restricted motion. There is mild aortic regurgitation.    Mitral Valve: There is moderate regurgitation with a centrally directed jet.    Tricuspid Valve: There is mild regurgitation.    Aorta: Aortic root is normal in size measuring 3.11 cm. Ascending aorta is at least upper normal in size measuring 3.7 cm, but the ascending aorta is not ideally visualized.    Pulmonary Artery: The estimated pulmonary artery systolic pressure is 28 mmHg.    IVC/SVC: Normal venous pressure at 3 mmHg.

## 2025-03-01 NOTE — CONSULTS
[facility]  Nephrology  Consult Note    Patient Name: Lily Green  MRN: [id number]  Admission Date: 3/1/2025  Hospital Length of Stay: 0 days  Attending Provider: Lesly Reed MD   Primary Care Physician: Pavel Calixto MD  Principal Problem:ESRD (end stage renal disease) on dialysis    Inpatient consult to Nephrology  Consult performed by: Neto Hutton NP  Consult ordered by: Eris Sullivan MD  Reason for consult: ESRD        Subjective:     HPI: Lily Green is a 74-year-old with history of RML carcinoid tumor s/p wedge resection, ESRD on dialysis, and DM presenting due to shortness of breath. Patient states that for the past 2 days she has been having nasal congestion and cough. She has had productive sputum. Denies any blood in her sputum. Denies any fevers. States that she was supposed to get dialysis this Saturday however her dialysis got pushed to  due to the parades. Denies any chest pain, abdominal pain, changes in bowel movements. CXR with pulmonary edema, BNP >2000.  Electrolytes non-emergent, currently oxygenating well on RA.  Nephrology consulted for dialysis while IP    Past Medical History:   Diagnosis Date    Anxiety     Celiac disease 2018    Celiac disease     Depression     Diabetes mellitus     Family history of breast cancer in mother      at age 68    Hyperlipidemia     Hypertension     Hypertensive retinopathy of both eyes 2024    Meniere disease     Meniere's disease, unspecified ear     Menopause     Moderate nonproliferative diabetic retinopathy of both eyes 2024    On supplemental oxygen by nasal cannula 2023    Hypoxic respiratory failure resolved.  No oxygen desaturation on walk.        Peptic ulcer     Peritonitis 2023    Pleural effusion on left 01/15/2024    C/w transudate in setting of acute decompensated heart failure due to a-fib and volume overload.  Now appears euvolemic in clinic today, resolved.        Reflux esophagitis     Urinary tract infection     Vaginal infection     Vaginal Pap smear 04/07/2014    Pap/Hpv Negative        Past Surgical History:   Procedure Laterality Date    APPENDECTOMY      BREAST SURGERY      Tags    CARPAL TUNNEL RELEASE Bilateral 09/2017    ,     CLOSURE, COLOSTOMY Left 01/16/2023    Procedure: CLOSURE, COLOSTOMY;  Surgeon: Manuel Javier MD;  Location: [facility] OR;  Service: General;  Laterality: Left;    COLONOSCOPY  04/2018    Normal  ([place])     COLOSTOMY Right 01/16/2023    Procedure: CREATION, COLOSTOMY;  Surgeon: Manuel Javier MD;  Location: [facility] OR;  Service: General;  Laterality: Right;    CORONARY ANGIOGRAPHY N/A 02/21/2024    Procedure: ANGIOGRAM, CORONARY ARTERY;  Surgeon: Todd Carlisle MD;  Location: [facility] CATH LAB;  Service: Cardiology;  Laterality: N/A;    DILATION AND CURETTAGE OF UTERUS  1986    DUPUYTREN CONTRACTURE RELEASE Bilateral 09/2017    ESOPHAGOGASTRODUODENOSCOPY N/A 01/24/2024    Procedure: EGD (ESOPHAGOGASTRODUODENOSCOPY);  Surgeon: Yamilet Walters MD;  Location: [facility] ENDO ([facility] FLR);  Service: Gastroenterology;  Laterality: N/A;    ESOPHAGOGASTRODUODENOSCOPY N/A 01/26/2024    Procedure: EGD (ESOPHAGOGASTRODUODENOSCOPY);  Surgeon: Yamilet Walters MD;  Location: [facility] ENDO ([facility] FLR);  Service: Gastroenterology;  Laterality: N/A;    HYSTERECTOMY  1987    BEAU w/ appy, no BSO     IMPLANTATION OF LEADLESS PACEMAKER N/A 01/16/2024    Procedure: INSERTION, CARDIAC PACEMAKER, LEADLESS;  Surgeon: LENIN Frances MD;  Location: [facility] EP LAB;  Service: Cardiology;  Laterality: N/A;  SB, LEADLESS PPM (MICRA), MDT, ANES, EH, CVICU [id number]    INJECTION OF NEUROLYTIC AGENT AROUND LUMBAR SYMPATHETIC NERVE N/A 01/06/2022    Procedure: BLOCK, LUMBAR SYMPATHETIC;  Surgeon: Souleymane Rizo Jr., MD;  Location: [facility] PAIN MGT;  Service: Pain Management;  Laterality: N/A;  no pacemaker   pt is diabetic     INJECTION OF NEUROLYTIC AGENT AROUND LUMBAR  SYMPATHETIC NERVE N/A 08/25/2022    Procedure: BLOCK, LUMBAR SYMPATHETIC;  Surgeon: Souleymane Rizo Jr., MD;  Location: [facility] PAIN MGT;  Service: Pain Management;  Laterality: N/A;  diabetic     INSERTION OF TUNNELED CENTRAL VENOUS HEMODIALYSIS CATHETER Right 01/25/2023    Procedure: INSERTION, CATHETER, HEMODIALYSIS, DUAL LUMEN;  Surgeon: Manuel Javier MD;  Location: [facility] OR;  Service: General;  Laterality: Right;    INSERTION, CATHETER, TUNNELED Left 01/28/2023    Procedure: Insertion,catheter,tunneled;  Surgeon: Watson Fontenot MD;  Location: [facility] OR;  Service: General;  Laterality: Left;    LAPAROTOMY, EXPLORATORY N/A 01/14/2023    Procedure: LAPAROTOMY, EXPLORATORY;  Surgeon: Manuel Javier MD;  Location: [facility] OR;  Service: General;  Laterality: N/A;    LAPAROTOMY, EXPLORATORY N/A 01/16/2023    Procedure: LAPAROTOMY, EXPLORATORY;  Surgeon: Manuel Javier MD;  Location: [facility] OR;  Service: General;  Laterality: N/A;    LEFT HEART CATHETERIZATION Left 02/21/2024    Procedure: Left heart cath;  Surgeon: Todd Carlisle MD;  Location: [facility] CATH LAB;  Service: Cardiology;  Laterality: Left;    LYMPHADENECTOMY Right 01/02/2024    Procedure: LYMPHADENECTOMY;  Surgeon: Tan Thomson MD;  Location: [address];  Service: Cardiothoracic;  Laterality: Right;    PACEMAKER, TEMPORARY, TRANSVENOUS, PEDIATRIC Right 01/12/2024    Procedure: Pacemaker, Temporary, Transvenous;  Surgeon: Todd Carlisle MD;  Location: [facility] CATH LAB;  Service: Cardiology;  Laterality: Right;    REMOVAL OF CATHETER Right 01/28/2023    Procedure: REMOVAL, CATHETER;  Surgeon: Watson Fontenot MD;  Location: [facility] OR;  Service: General;  Laterality: Right;    REMOVAL, TUNNELED CATH Right 01/25/2023    Procedure: REMOVAL, TUNNELED CATH;  Surgeon: Manuel Javier MD;  Location: [facility] OR;  Service: General;  Laterality: Right;    ROBOTIC BRONCHOSCOPY N/A 10/20/2023    Procedure: ROBOTIC BRONCHOSCOPY;  Surgeon: Na  Mayda ROSALES MD;  Location: [address];  Service: Pulmonary;  Laterality: N/A;    SHOULDER SURGERY  2009 & 2010    right rotator cuff    THORACOTOMY Right 01/02/2024    Procedure: THORACOTOMY;  Surgeon: Tan Thomson MD;  Location: [address];  Service: Cardiothoracic;  Laterality: Right;    THORACOTOMY, WITH INITIAL THERAPEUTIC WEDGE RESECTION OF LUNG Right 01/02/2024    Procedure: THORACOTOMY, WITH INITIAL THERAPEUTIC WEDGE RESECTION OF LUNG;  Surgeon: Tan Thomson MD;  Location: [address];  Service: Cardiothoracic;  Laterality: Right;    TONSILLECTOMY      UPPER GASTROINTESTINAL ENDOSCOPY  04/2018       Review of patient's allergies indicates:   Allergen Reactions    Crestor [rosuvastatin] Swelling    Gluten protein      Current Facility-Administered Medications   Medication Frequency    amiodarone tablet 200 mg BID    aspirin EC tablet 81 mg QAM    atorvastatin tablet 40 mg QHS    B-complex with vitamin C tablet 1 tablet QAM    calcium-vitamin D3 500 mg-5 mcg (200 unit) per tablet 1 tablet Daily    carvediloL tablet 6.25 mg BID WM    dextrose 50% injection 12.5 g PRN    dextrose 50% injection 25 g PRN    glucagon (human recombinant) injection 1 mg PRN    glucose chewable tablet 16 g PRN    glucose chewable tablet 24 g PRN    HYDROcodone-acetaminophen 7.5-325 mg per tablet 1 tablet QID PRN    insulin aspart U-100 pen 0-10 Units QID (AC + HS) PRN    levothyroxine tablet 112 mcg Before breakfast    loperamide capsule 2 mg Once PRN    midodrine tablet 10 mg TID WM    midodrine tablet 10 mg PRN    pantoprazole EC tablet 40 mg Daily    pregabalin capsule 75 mg Every other day    sevelamer carbonate tablet 800 mg TID WM    torsemide tablet 20 mg BID    valsartan split tablet 20 mg BID    venlafaxine 24 hr capsule 37.5 mg Daily     Current Outpatient Medications   Medication    amiodarone (PACERONE) 200 MG Tab    aspirin (ECOTRIN) 81 MG EC tablet    atorvastatin (LIPITOR) 40 MG tablet     "B-complex with vitamin C (Z-BEC OR EQUIV) tablet    beta carotene 96215 UNIT capsule    blood sugar diagnostic (TRUE METRIX GLUCOSE TEST STRIP) Strp    blood-glucose meter kit    calcium carbonate-vitamin D3 500 mg-10 mcg (400 unit) Tab    carvediloL (COREG) 6.25 MG tablet    HYDROcodone-acetaminophen (NORCO) 7.5-325 mg per tablet    insulin aspart U-100 (NOVOLOG) 100 unit/mL (3 mL) InPn pen    lancets (TRUEPLUS LANCETS) 30 gauge Misc    lancets Misc    levothyroxine (SYNTHROID) 112 MCG tablet    linaGLIPtin (TRADJENTA) 5 mg Tab tablet    loperamide (IMODIUM) 2 mg capsule    midodrine (PROAMATINE) 10 MG tablet    pantoprazole (PROTONIX) 40 MG tablet    pen needle, diabetic (BD ULTRA-FINE MARIS PEN NEEDLE) 32 gauge x 5/32" Ndle    pregabalin (LYRICA) 75 MG capsule    MELLISSA-BENJY RX 1- mg-mg-mcg Tab    sevelamer carbonate (RENVELA) 800 mg Tab    torsemide (DEMADEX) 20 MG Tab    valsartan (DIOVAN) 40 MG tablet    venlafaxine (EFFEXOR-XR) 37.5 MG 24 hr capsule     Family History       Problem Relation (Age of Onset)    Arthritis Father    Breast cancer Mother, Paternal Aunt    Cancer Mother, Paternal Aunt    Diabetes Paternal Grandfather    Hyperlipidemia Sister    Vision loss Father, Mother, Sister          Tobacco Use    Smoking status: Never    Smokeless tobacco: Never   Substance and Sexual Activity    Alcohol use: Not Currently     Comment: Use to drink. Haven't had a drink in the past 10 years.    Drug use: Never    Sexual activity: Not Currently     Partners: Male     Birth control/protection: See Surgical Hx     Comment: :      Review of Systems   Constitutional:  Negative for activity change, appetite change, chills, fatigue, fever and unexpected weight change.   HENT:  Negative for facial swelling, hearing loss, mouth sores, nosebleeds, sinus pressure, sore throat, trouble swallowing and voice change.    Eyes:  Negative for photophobia, pain, redness and itching.   Respiratory:  Positive for " shortness of breath. Negative for cough, choking and wheezing.    Cardiovascular:  Negative for chest pain, palpitations and leg swelling.   Gastrointestinal:  Negative for abdominal distention, abdominal pain, blood in stool, constipation, diarrhea, nausea and vomiting.   Endocrine: Negative for polyuria.   Genitourinary:  Negative for difficulty urinating, dysuria, flank pain, frequency, hematuria and vaginal bleeding.   Musculoskeletal:  Negative for arthralgias, back pain, gait problem, joint swelling, myalgias, neck pain and neck stiffness.   Skin:  Negative for rash and wound.   Neurological:  Negative for dizziness, tremors, seizures, syncope, weakness, light-headedness, numbness and headaches.   Hematological:  Does not bruise/bleed easily.   Psychiatric/Behavioral:  Negative for agitation, behavioral problems, confusion, decreased concentration, dysphoric mood, hallucinations, sleep disturbance and suicidal ideas. The patient is not nervous/anxious and is not hyperactive.      Objective:     Vital Signs (Most Recent):  Temp: 97.8 °F (36.6 °C) (03/01/25 1145)  Pulse: (!) 56 (03/01/25 1145)  Resp: 17 (03/01/25 1145)  BP: 134/61 (03/01/25 1145)  SpO2: 99 % (03/01/25 1145) Vital Signs (24h Range):  Temp:  [97.8 °F (36.6 °C)-98.2 °F (36.8 °C)] 97.8 °F (36.6 °C)  Pulse:  [55-60] 56  Resp:  [17-18] 17  SpO2:  [97 %-100 %] 99 %  BP: (120-156)/(61-72) 134/61     Weight: 55.8 kg (123 lb 0.3 oz) (03/01/25 0550)  Body mass index is 20.47 kg/m².  Body surface area is 1.6 meters squared.    No intake/output data recorded.     Physical Exam  Vitals and nursing note reviewed.   Constitutional:       General: She is not in acute distress.     Appearance: Normal appearance. She is not ill-appearing or toxic-appearing.   HENT:      Head: Normocephalic and atraumatic.      Nose: No congestion or rhinorrhea.      Mouth/Throat:      Mouth: Mucous membranes are moist.      Pharynx: Oropharynx is clear.   Eyes:      General: No  scleral icterus.        Right eye: No discharge.         Left eye: No discharge.      Extraocular Movements: Extraocular movements intact.      Conjunctiva/sclera: Conjunctivae normal.   Cardiovascular:      Rate and Rhythm: Regular rhythm. Bradycardia present.      Heart sounds: No murmur heard.     No friction rub. No gallop.   Pulmonary:      Effort: Pulmonary effort is normal. No respiratory distress.      Breath sounds: Rales present. No wheezing.   Abdominal:      General: There is no distension.      Palpations: Abdomen is soft.      Tenderness: There is no abdominal tenderness.   Musculoskeletal:         General: No swelling.      Cervical back: Normal range of motion and neck supple.      Right lower leg: No edema.      Left lower leg: No edema.   Skin:     General: Skin is warm and dry.   Neurological:      General: No focal deficit present.      Mental Status: She is alert and oriented to person, place, and time.   Psychiatric:         Mood and Affect: Mood normal.         Behavior: Behavior normal.          Significant Labs:  CBC:   Recent Labs   Lab 03/01/25  0325   WBC 11.69   RBC 2.89*   HGB 9.1*   HCT 30.7*      *   MCH 31.5*   MCHC 29.6*     CMP:   Recent Labs   Lab 03/01/25  0325   *   CALCIUM 9.5   ALBUMIN 3.1*   PROT 7.5   *   K 4.5   CO2 21*      BUN 47*   CREATININE 6.8*   ALKPHOS 149   ALT 27   AST 35   BILITOT 0.3       Assessment/Plan:     Renal/  * ESRD (end stage renal disease) on dialysis  ESRD on iHD [facility]  Magdalene Vyas  3:15  EDW:  55.5    Today's weight 56.5 kg.  Based off CXR and BNP, will attempt to decrease EDW to 54.5 kg.   Often has hypotensive episodes with dialysis, uses midodrine PRN, UF profile    -seen on HD, tolerating well  -midodrine ordered  -dialysate bath as been adjusted to current labs  -strict I/O's  -if feeling better after UF, would recommend discharge so she can go to her scheduled HD session tomorrow for additional  UF if needed.      Neto Pitts NP  Nephrology  [facility] Dept

## 2025-03-01 NOTE — ED PROVIDER NOTES
Encounter Date: 3/1/2025       History     Chief Complaint   Patient presents with    Shortness of Breath     Pt. Presents to the ED with complaints of SOB x few days, + dialysis patient, has hx of lung cancer, + expiratory wheezes      Patient is a 74-year-old with history of RML carcinoid tumor s/p wedge resection, ESRD on dialysis, and DM presenting due to shortness of breath.  Patient states that for the past 2 days she has been having nasal congestion and cough.  She has had productive sputum.  Denies any blood in her sputum.  Denies any fevers.  States that she was supposed to get dialysis this Saturday however her dialysis got pushed to  due to the parades.  Denies any chest pain, abdominal pain, changes in bowel movements.        Review of patient's allergies indicates:   Allergen Reactions    Crestor [rosuvastatin] Swelling    Gluten protein      Past Medical History:   Diagnosis Date    Anxiety     Celiac disease 2018    Celiac disease     Depression     Diabetes mellitus     Family history of breast cancer in mother      at age 68    Hyperlipidemia     Hypertension     Hypertensive retinopathy of both eyes 2024    Meniere disease     Meniere's disease, unspecified ear     Menopause     Moderate nonproliferative diabetic retinopathy of both eyes 2024    On supplemental oxygen by nasal cannula 2023    Hypoxic respiratory failure resolved.  No oxygen desaturation on walk.        Peptic ulcer     Peritonitis 2023    Pleural effusion on left 01/15/2024    C/w transudate in setting of acute decompensated heart failure due to a-fib and volume overload.  Now appears euvolemic in clinic today, resolved.       Reflux esophagitis     Urinary tract infection     Vaginal infection     Vaginal Pap smear 2014    Pap/Hpv Negative      Past Surgical History:   Procedure Laterality Date    APPENDECTOMY      BREAST SURGERY      Tags    CARPAL TUNNEL RELEASE Bilateral 2017     ,     CLOSURE, COLOSTOMY Left 01/16/2023    Procedure: CLOSURE, COLOSTOMY;  Surgeon: Manuel Javier MD;  Location: [facility] OR;  Service: General;  Laterality: Left;    COLONOSCOPY  04/2018    Normal  (Cornell Juan A)     COLOSTOMY Right 01/16/2023    Procedure: CREATION, COLOSTOMY;  Surgeon: Manuel Javier MD;  Location: [facility] OR;  Service: General;  Laterality: Right;    CORONARY ANGIOGRAPHY N/A 02/21/2024    Procedure: ANGIOGRAM, CORONARY ARTERY;  Surgeon: Todd Carlisle MD;  Location: [facility] CATH LAB;  Service: Cardiology;  Laterality: N/A;    DILATION AND CURETTAGE OF UTERUS  1986    DUPUYTREN CONTRACTURE RELEASE Bilateral 09/2017    ESOPHAGOGASTRODUODENOSCOPY N/A 01/24/2024    Procedure: EGD (ESOPHAGOGASTRODUODENOSCOPY);  Surgeon: Yamilet Walters MD;  [facility]  Service: Gastroenterology;  Laterality: N/A;    ESOPHAGOGASTRODUODENOSCOPY N/A 01/26/2024    Procedure: EGD (ESOPHAGOGASTRODUODENOSCOPY);  Surgeon: Yamilet Walters MD;  [facility]  Service: Gastroenterology;  Laterality: N/A;    HYSTERECTOMY  1987    BEAU w/ appy, no BSO     IMPLANTATION OF LEADLESS PACEMAKER N/A 01/16/2024    Procedure: INSERTION, CARDIAC PACEMAKER, LEADLESS;  Surgeon: LENIN Frances MD;  Location: [facility] EP LAB;  Service: Cardiology;  Laterality: N/A;  SB, LEADLESS PPM (MICRA), MDT, ANES, EH, CVICU [id number]    INJECTION OF NEUROLYTIC AGENT AROUND LUMBAR SYMPATHETIC NERVE N/A 01/06/2022    Procedure: BLOCK, LUMBAR SYMPATHETIC;  Surgeon: Souleymane Rizo Jr., MD;  Location: [facility] PAIN MGT;  Service: Pain Management;  Laterality: N/A;  no pacemaker   pt is diabetic     INJECTION OF NEUROLYTIC AGENT AROUND LUMBAR SYMPATHETIC NERVE N/A 08/25/2022    Procedure: BLOCK, LUMBAR SYMPATHETIC;  Surgeon: Souleymane Rizo Jr., MD;  Location: [facility] PAIN MGT;  Service: Pain Management;  Laterality: N/A;  diabetic     INSERTION OF TUNNELED CENTRAL VENOUS HEMODIALYSIS CATHETER Right 01/25/2023     Procedure: INSERTION, CATHETER, HEMODIALYSIS, DUAL LUMEN;  Surgeon: Manuel Javier MD;  Location: [facility] OR;  Service: General;  Laterality: Right;    INSERTION, CATHETER, TUNNELED Left 01/28/2023    Procedure: Insertion,catheter,tunneled;  Surgeon: Watson Fontenot MD;  Location: [facility] OR;  Service: General;  Laterality: Left;    LAPAROTOMY, EXPLORATORY N/A 01/14/2023    Procedure: LAPAROTOMY, EXPLORATORY;  Surgeon: Manuel Javier MD;  Location: [facility] OR;  Service: General;  Laterality: N/A;    LAPAROTOMY, EXPLORATORY N/A 01/16/2023    Procedure: LAPAROTOMY, EXPLORATORY;  Surgeon: Manuel Javier MD;  Location: [facility] OR;  Service: General;  Laterality: N/A;    LEFT HEART CATHETERIZATION Left 02/21/2024    Procedure: Left heart cath;  Surgeon: Todd Carlisle MD;  Location: [facility] CATH LAB;  Service: Cardiology;  Laterality: Left;    LYMPHADENECTOMY Right 01/02/2024    Procedure: LYMPHADENECTOMY;  Surgeon: Tan Thomson MD;  Location: [facility] OR 2ND FLR;  Service: Cardiothoracic;  Laterality: Right;    PACEMAKER, TEMPORARY, TRANSVENOUS, PEDIATRIC Right 01/12/2024    Procedure: Pacemaker, Temporary, Transvenous;  Surgeon: Todd Carlisle MD;  Location: [facility] CATH LAB;  Service: Cardiology;  Laterality: Right;    REMOVAL OF CATHETER Right 01/28/2023    Procedure: REMOVAL, CATHETER;  Surgeon: Watson Fontenot MD;  Location: [facility] OR;  Service: General;  Laterality: Right;    REMOVAL, TUNNELED CATH Right 01/25/2023    Procedure: REMOVAL, TUNNELED CATH;  Surgeon: Manuel Javier MD;  Location: [facility] OR;  Service: General;  Laterality: Right;    ROBOTIC BRONCHOSCOPY N/A 10/20/2023    Procedure: ROBOTIC BRONCHOSCOPY;  Surgeon: Mayda Monzon MD;  Location: [facility] OR 2ND FLR;  Service: Pulmonary;  Laterality: N/A;    SHOULDER SURGERY  2009 & 2010    right rotator cuff    THORACOTOMY Right 01/02/2024    Procedure: THORACOTOMY;  Surgeon: Tan Thomson MD;  Location: [facility] OR 2ND FLR;  Service:  Cardiothoracic;  Laterality: Right;    THORACOTOMY, WITH INITIAL THERAPEUTIC WEDGE RESECTION OF LUNG Right 01/02/2024    Procedure: THORACOTOMY, WITH INITIAL THERAPEUTIC WEDGE RESECTION OF LUNG;  Surgeon: Tan Thomson MD;  Location: [facility] OR [address] [place] [address];  Service: Cardiothoracic;  Laterality: Right;    TONSILLECTOMY      UPPER GASTROINTESTINAL ENDOSCOPY  04/2018     Family History   Problem Relation Name Age of Onset    Vision loss Father Shin Cervantes .     Arthritis Father Shin Cervantes .     Breast cancer Mother Megan Cervantes     Cancer Mother Megan Cervantes     Vision loss Mother Megan Cervantes     Hyperlipidemia Sister Sweetie Saha     Breast cancer Paternal Aunt Ibis Redditt     Cancer Paternal Aunt Ibis Redmarshall     Diabetes Paternal Grandfather Shin Cervantes, Sr.     Vision loss Sister Noreen Saha     Kidney disease Neg Hx       Social History[1]  Review of Systems  Per HPI  Physical Exam     Initial Vitals [03/01/25 0219]   BP Pulse Resp Temp SpO2   120/72 60 18 98 °F (36.7 °C) 100 %      MAP       --         Physical Exam    Constitutional: She appears well-developed and well-nourished. She is not diaphoretic. No distress.   Eyes: Pupils are equal, round, and reactive to light. Right eye exhibits no discharge. Left eye exhibits no discharge. No scleral icterus.   Cardiovascular:  Normal rate, regular rhythm and normal heart sounds.           No murmur heard.  Pulmonary/Chest: No stridor. No respiratory distress. She has no wheezes. She has no rhonchi. She has rales.   Abdominal: Abdomen is soft. She exhibits no distension. There is no abdominal tenderness. There is no rebound and no guarding.   Musculoskeletal:         General: No tenderness or edema.     Neurological: She is alert. GCS score is 15. GCS eye subscore is 4. GCS verbal subscore is 5. GCS motor subscore is 6.   Skin: Skin is warm and dry.   Psychiatric: She has a normal mood and affect.         ED Course   Procedures  Labs Reviewed    CBC W/ AUTO DIFFERENTIAL - Abnormal       Result Value    WBC 11.69      RBC 2.89 (*)     Hemoglobin 9.1 (*)     Hematocrit 30.7 (*)      (*)     MCH 31.5 (*)     MCHC 29.6 (*)     RDW 15.4 (*)     Platelets 194      MPV 11.9      Immature Granulocytes 0.5      Gran # (ANC) 8.6 (*)     Immature Grans (Abs) 0.06 (*)     Lymph # 1.0      Mono # 1.5 (*)     Eos # 0.5      Baso # 0.03      nRBC 0      Gran % 73.3 (*)     Lymph % 8.6 (*)     Mono % 12.7      Eosinophil % 4.6      Basophil % 0.3      Differential Method Automated      Narrative:     ADD ON TROPONIN HIGH SENSITIVITY PER DR EDGAR CHAPPELL/ORDER#   [id number]   COMPREHENSIVE METABOLIC PANEL - Abnormal    Sodium 135 (*)     Potassium 4.5      Chloride 101      CO2 21 (*)     Glucose 145 (*)     BUN 47 (*)     Creatinine 6.8 (*)     Calcium 9.5      Total Protein 7.5      Albumin 3.1 (*)     Total Bilirubin 0.3      Alkaline Phosphatase 149      AST 35      ALT 27      eGFR 5.9 (*)     Anion Gap 13      Narrative:     ADD ON TROPONIN HIGH SENSITIVITY PER DR EDGAR CHAPPELL/ORDER#   [id number]   B-TYPE NATRIURETIC PEPTIDE - Abnormal    BNP 2,551 (*)     Narrative:     ADD ON TROPONIN HIGH SENSITIVITY PER DR EDGAR CHAPPELL/ORDER#   [id number]   TROPONIN I HIGH SENSITIVITY - Abnormal    Troponin I High Sensitivity 18 (*)    TROPONIN I HIGH SENSITIVITY - Abnormal    Troponin I High Sensitivity 19 (*)     Narrative:     ADD ON TROPONIN HIGH SENSITIVITY PER DR EDGAR CHAPPELL/ORDER#   [id number]   HEPATITIS C ANTIBODY    Hepatitis C Ab Non-reactive      Narrative:     Release to patient->Immediate   HIV 1 / 2 ANTIBODY    HIV 1/2 Ag/Ab Non-reactive      Narrative:     Release to patient->Immediate   SARS-COV2 (COVID) WITH FLU/RSV BY PCR    SARS-CoV2 (COVID-19) Qualitative PCR Not Detected      Influenza A, Molecular Not Detected      Influenza B, Molecular Not Detected      RSV Ag by Molecular Method Not Detected     TROPONIN I HIGH SENSITIVITY       "    Imaging Results              X-Ray Chest AP Portable (Final result)  Result time 03/01/25 04:47:47      Final result by Deric Tellez MD (03/01/25 04:47:47)                   Impression:      Cardiomegaly with interstitial edema and small pleural effusions.  Aeration is mildly worse when compared with the prior exam.      Electronically signed by: Deric Tellez MD  Date:    03/01/2025  Time:    04:47               Narrative:    EXAMINATION:  XR CHEST AP PORTABLE    CLINICAL HISTORY:  Provided history is "  Shortness of breath".    TECHNIQUE:  One view of the chest.    COMPARISON:  10/03/2024.    FINDINGS:  Presumed leadless pacemaker device overlies the heart.  Cardiac silhouette is enlarged.  Atherosclerotic calcifications overlie the aortic arch.  Stent overlies the superior mediastinum.  Surgical clips overlie the chest wall and left upper extremity.  Central vascular congestion.  Bilateral interstitial opacities, right side greater than left.  Small pleural effusions, right side worse than left.  No distinct pneumothorax.  Aeration is similar but mildly worse when compared with prior radiograph.  Degenerative changes in the shoulders, right side worse than left.  Operative changes in the right shoulder.                                    X-Rays:   Independently Interpreted Readings:   Other Readings:  Bilateral increased reticular opacities.  No focal opacity.  Mild cardiomegaly noted.  Concern for pulmonary edema.    Medications - No data to display  Medical Decision Making  Lily Green is a 74 y.o. female with a past medical history of HF, ESRD presenting to the ED with SOB. History and physical exam as above. Initial vital signs stable and non-actionable. Initial work-up to include: Labs (CBC, CMP, Troponin, BNP,), Imaging (CXR,), EKG,    Differential diagnosis for this patient includes, but is not limited to:  Pneumonia, COPD, CHF, volume overload 2/2 ESRD.      Results of workup " include chest x-ray with diffuse reticular opacities increased W/O focal consolidation, afebrile, significantly elevated BNP, electrolytes unremarkable, EKG stable, no wheezes on exam, no history of asthma/COPD, due for dialysis.  This raises suspicion for volume overload 2/2 ESRD.  Plan is to admit to Hospital Medicine for dialysis today.  Nephrology consulted.  Nephrologists messaged.  Patient admitted to Hospital Medicine.  Discussed plan with the patient who is in agreement.  Questions answered.      Amount and/or Complexity of Data Reviewed  Labs: ordered. Decision-making details documented in ED Course.  Radiology: ordered.               ED Course as of 03/01/25 0659   Sat Mar 01, 2025   0423 BNP(!): 2,551 [MB]   0424 Troponin I High Sensitivity(!): 19 [MB]      ED Course User Index  [MB] Eris Sullivan MD                           Clinical Impression:  Final diagnoses:  [R06.02] SOB (shortness of breath) (Primary)          ED Disposition Condition    Observation Stable                    [1]   Social History  Tobacco Use    Smoking status: Never    Smokeless tobacco: Never   Substance Use Topics    Alcohol use: Not Currently     Comment: Use to drink. Haven't had a drink in the past 10 years.    Drug use: Never        Eris Sullivan MD  Resident  03/01/25 0659

## 2025-03-02 NOTE — NURSING
3.5 hours  dialysis treatment completed . 2 L UF removed . Both lumens of HD catheter were flushed , Heparine locked and wrapped  with tape and gauze .  Report given to Primary Nurse.

## 2025-03-18 NOTE — ASSESSMENT & PLAN NOTE
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 On HD ~ 1 year. Last HD prior to presentation 1/1/24. Pt has NELY AVF placed on 10/23 that has not been cleared by vascular. Currently using RIJ TDC.     Nephrology History  iHD Schedule: TTS   Unit/MD: [facility]/ Dr. Vyas  Duration: 3.5 hours   UF:   EDW: 58 kg   Access: RIJ TDC  Residual Renal Function: minimal     Assessment:   - Plan for iHD 1/3/24  - midodrine as needed with HD   - continue torsemide   - continue sevelamer 800 TIDWM   - Hgb goal 10-11, continue epo with HD   - Renal diet, if not NPO    -pre and post HD weight   - Continue to monitor intake and output, daily weights   - Avoid nephrotoxic medication and renal dose medications to GFR                     Detail Level: Zone Render Risk Assessment In Note?: yes Recommendation Preamble: The following recommendations were made during the visit:biopsy Patient Management Risk Assessment: Moderate

## 2025-04-07 ENCOUNTER — CLINICAL SUPPORT (OUTPATIENT)
Dept: CARDIOLOGY | Facility: HOSPITAL | Age: 75
End: 2025-04-07
Payer: MEDICARE

## 2025-04-07 ENCOUNTER — CLINICAL SUPPORT (OUTPATIENT)
Dept: CARDIOLOGY | Facility: HOSPITAL | Age: 75
End: 2025-04-07
Attending: STUDENT IN AN ORGANIZED HEALTH CARE EDUCATION/TRAINING PROGRAM
Payer: MEDICARE

## 2025-04-07 DIAGNOSIS — R00.1 BRADYCARDIA, UNSPECIFIED: ICD-10-CM

## 2025-04-07 DIAGNOSIS — Z95.0 PRESENCE OF CARDIAC PACEMAKER: ICD-10-CM

## 2025-04-07 PROCEDURE — 93294 REM INTERROG EVL PM/LDLS PM: CPT | Mod: ,,, | Performed by: STUDENT IN AN ORGANIZED HEALTH CARE EDUCATION/TRAINING PROGRAM

## 2025-04-07 PROCEDURE — 93296 REM INTERROG EVL PM/IDS: CPT | Performed by: STUDENT IN AN ORGANIZED HEALTH CARE EDUCATION/TRAINING PROGRAM

## 2025-04-07 RX ORDER — MIDODRINE HYDROCHLORIDE 10 MG/1
10 TABLET ORAL EVERY 8 HOURS
Qty: 120 TABLET | Refills: 3 | Status: SHIPPED | OUTPATIENT
Start: 2025-04-07

## 2025-04-08 ENCOUNTER — PATIENT MESSAGE (OUTPATIENT)
Dept: OTOLARYNGOLOGY | Facility: CLINIC | Age: 75
End: 2025-04-08
Payer: MEDICARE

## 2025-04-08 DIAGNOSIS — E78.2 HYPERLIPIDEMIA, MIXED: ICD-10-CM

## 2025-04-08 RX ORDER — ATORVASTATIN CALCIUM 40 MG/1
40 TABLET, FILM COATED ORAL NIGHTLY
Qty: 90 TABLET | Refills: 3 | Status: SHIPPED | OUTPATIENT
Start: 2025-04-08

## 2025-04-08 NOTE — TELEPHONE ENCOUNTER
Care Due:                  Date            Visit Type   Department     Provider  --------------------------------------------------------------------------------                                EP -                              PRIMARY      [facility]  Last Visit: 08-      CARE ([facility])   CARE           Pavel Deleon                              EP -                              PRIMARY      [facility] PR[facility]  Next Visit: 05-      CARE ([facility])   CARE           Pavel Deleon                                                            Last  Test          Frequency    Reason                     Performed    Due Date  --------------------------------------------------------------------------------    TSH.........  12 months..  levothyroxine............  03- 03-    [facility] Embedded Care Due Messages. Reference number: [id number].   4/08/2025 10:42:19 AM CDT

## 2025-04-10 LAB
OHS CV DC REMOTE DEVICE TYPE: NORMAL
OHS CV RV PACING PERCENT: 15.8 %

## 2025-04-21 RX ORDER — AMIODARONE HYDROCHLORIDE 200 MG/1
200 TABLET ORAL 2 TIMES DAILY
Qty: 180 TABLET | Refills: 3 | Status: SHIPPED | OUTPATIENT
Start: 2025-04-21

## 2025-04-21 NOTE — TELEPHONE ENCOUNTER
----- Message from Sol sent at 4/21/2025 10:22 AM CDT -----  Contact: [phone]  [email or web address] Patient is calling for Medical Advice regarding:pt is calling in regards to some medications and she ran out of some and needs someone call as soon as possible Patient wants a call back or [facility]:call backComments:Please advise patient replies from provider may take up to 48 hours.

## 2025-04-21 NOTE — TELEPHONE ENCOUNTER
Spoke with pt and asking if you could refill her amiodarone, RX pended.     Upcoming visit on 5/28

## 2025-04-23 ENCOUNTER — OFFICE VISIT (OUTPATIENT)
Dept: PODIATRY | Facility: CLINIC | Age: 75
End: 2025-04-23
Payer: MEDICARE

## 2025-04-23 VITALS — BODY MASS INDEX: 20.49 KG/M2 | HEIGHT: 65 IN | WEIGHT: 123 LBS

## 2025-04-23 DIAGNOSIS — E11.42 DIABETIC POLYNEUROPATHY ASSOCIATED WITH TYPE 2 DIABETES MELLITUS: Primary | ICD-10-CM

## 2025-04-23 DIAGNOSIS — L84 CORN OR CALLUS: ICD-10-CM

## 2025-04-23 DIAGNOSIS — B35.1 ONYCHOMYCOSIS DUE TO DERMATOPHYTE: ICD-10-CM

## 2025-04-23 DIAGNOSIS — M20.41 HAMMER TOES OF BOTH FEET: ICD-10-CM

## 2025-04-23 DIAGNOSIS — M20.42 HAMMER TOES OF BOTH FEET: ICD-10-CM

## 2025-04-23 PROCEDURE — 99999 PR PBB SHADOW E&M-EST. PATIENT-LVL IV: CPT | Mod: PBBFAC,,, | Performed by: PODIATRIST

## 2025-04-23 NOTE — PROGRESS NOTES
Subjective:      Patient ID: Lily Green is a 74 y.o. female.    Chief Complaint: Foot Pain (Dm, nail care/callous and pain on right side of right foot)      Lily Kline is a 74 y.o. female who presents to the clinic for evaluation and treatment of diabetic feet. Lily Kline has a past medical history of Anxiety, Celiac disease (05/2018), Celiac disease, Depression, Diabetes mellitus, Family history of breast cancer in mother, Hyperlipidemia, Hypertension, Hypertensive retinopathy of both eyes (12/18/2024), Meniere disease, Meniere's disease, unspecified ear, Menopause, Moderate nonproliferative diabetic retinopathy of both eyes (12/18/2024), On supplemental oxygen by nasal cannula (01/28/2023), Peptic ulcer, Peritonitis (01/14/2023), Pleural effusion on left (01/15/2024), Reflux esophagitis, Urinary tract infection, Vaginal infection, and Vaginal Pap smear (04/07/2014). Relates having toenails toenails and calluses that are in need of trimming.   Has not attempted to self treat.  Notes neuropathy pain of the lower extremities to be manageable at present.  Notes continued control over her blood glucose.  Occasionally, she experiences mild discomfort along the Rt. Styloid process, but she denies this being an issue with today's exam.   Denies any additional pedal complaints.    PCP: Pavel Calixto MD    Date Last Seen by PCP: 5/25    Hemoglobin A1C   Date Value Ref Range Status   01/28/2025 5.3 4.0 - 5.6 % Final     Comment:     ADA Screening Guidelines:  5.7-6.4%  Consistent with prediabetes  >or=6.5%  Consistent with diabetes    High levels of fetal hemoglobin interfere with the HbA1C  assay. Heterozygous hemoglobin variants (HbS, HgC, etc)do  not significantly interfere with this assay.   However, presence of multiple variants may affect accuracy.     11/15/2023 4.9 4.0 - 5.6 % Final     Comment:     ADA Screening Guidelines:  5.7-6.4%  Consistent with prediabetes  >or=6.5%  Consistent with  diabetes    High levels of fetal hemoglobin interfere with the HbA1C  assay. Heterozygous hemoglobin variants (HbS, HgC, etc)do  not significantly interfere with this assay.   However, presence of multiple variants may affect accuracy.     2023 5.3 4.0 - 5.6 % Final     Comment:     ADA Screening Guidelines:  5.7-6.4%  Consistent with prediabetes  >or=6.5%  Consistent with diabetes    High levels of fetal hemoglobin interfere with the HbA1C  assay. Heterozygous hemoglobin variants (HbS, HgC, etc)do  not significantly interfere with this assay.   However, presence of multiple variants may affect accuracy.     10/23/2019 6.8 % Final         Past Medical History:   Diagnosis Date    Anxiety     Celiac disease 2018    Celiac disease     Depression     Diabetes mellitus     Family history of breast cancer in mother      at age 68    Hyperlipidemia     Hypertension     Hypertensive retinopathy of both eyes 2024    Meniere disease     Meniere's disease, unspecified ear     Menopause     Moderate nonproliferative diabetic retinopathy of both eyes 2024    On supplemental oxygen by nasal cannula 2023    Hypoxic respiratory failure resolved.  No oxygen desaturation on walk.        Peptic ulcer     Peritonitis 2023    Pleural effusion on left 01/15/2024    C/w transudate in setting of acute decompensated heart failure due to a-fib and volume overload.  Now appears euvolemic in clinic today, resolved.       Reflux esophagitis     Urinary tract infection     Vaginal infection     Vaginal Pap smear 2014    Pap/Hpv Negative        Past Surgical History:   Procedure Laterality Date    APPENDECTOMY      BREAST SURGERY      Tags    CARPAL TUNNEL RELEASE Bilateral 2017    ,     CLOSURE, COLOSTOMY Left 2023    Procedure: CLOSURE, COLOSTOMY;  Surgeon: Manuel Javier MD;  Location: [facility];  Service: General;  Laterality: Left;    COLONOSCOPY  2018    Normal  (  Juan A)     COLOSTOMY Right 01/16/2023    Procedure: CREATION, COLOSTOMY;  Surgeon: Manuel Javier MD;  Location: [facility] OR;  Service: General;  Laterality: Right;    CORONARY ANGIOGRAPHY N/A 02/21/2024    Procedure: ANGIOGRAM, CORONARY ARTERY;  Surgeon: Todd Carlisle MD;  Location: [facility] CATH LAB;  Service: Cardiology;  Laterality: N/A;    DILATION AND CURETTAGE OF UTERUS  1986    DUPUYTREN CONTRACTURE RELEASE Bilateral 09/2017    ESOPHAGOGASTRODUODENOSCOPY N/A 01/24/2024    Procedure: EGD (ESOPHAGOGASTRODUODENOSCOPY);  Surgeon: Yamilet Walters MD;  Location: [facility] ENDO (2ND FLR);  Service: Gastroenterology;  Laterality: N/A;    ESOPHAGOGASTRODUODENOSCOPY N/A 01/26/2024    Procedure: EGD (ESOPHAGOGASTRODUODENOSCOPY);  Surgeon: Yamilet Walters MD;  Location: [facility] ENDO (2ND FLR);  Service: Gastroenterology;  Laterality: N/A;    HYSTERECTOMY  1987    BEAU w/ appy, no BSO     IMPLANTATION OF LEADLESS PACEMAKER N/A 01/16/2024    Procedure: INSERTION, CARDIAC PACEMAKER, LEADLESS;  Surgeon: LENIN Frances MD;  Location: [facility] EP LAB;  Service: Cardiology;  Laterality: N/A;  SB, LEADLESS PPM (MICRA), MDT, ANES, EH, CVICU [id number]    INJECTION OF NEUROLYTIC AGENT AROUND LUMBAR SYMPATHETIC NERVE N/A 01/06/2022    Procedure: BLOCK, LUMBAR SYMPATHETIC;  Surgeon: Souleymane Rizo Jr., MD;  Location: [facility] PAIN MGT;  Service: Pain Management;  Laterality: N/A;  no pacemaker   pt is diabetic     INJECTION OF NEUROLYTIC AGENT AROUND LUMBAR SYMPATHETIC NERVE N/A 08/25/2022    Procedure: BLOCK, LUMBAR SYMPATHETIC;  Surgeon: Souleymane Rizo Jr., MD;  Location: [facility] PAIN MGT;  Service: Pain Management;  Laterality: N/A;  diabetic     INSERTION OF TUNNELED CENTRAL VENOUS HEMODIALYSIS CATHETER Right 01/25/2023    Procedure: INSERTION, CATHETER, HEMODIALYSIS, DUAL LUMEN;  Surgeon: Manuel Javier MD;  Location: [facility] OR;  Service: General;  Laterality: Right;    INSERTION, CATHETER, TUNNELED Left 01/28/2023    Procedure:  Insertion,catheter,tunneled;  Surgeon: Watson Fontenot MD;  Location: [facility] OR;  Service: General;  Laterality: Left;    LAPAROTOMY, EXPLORATORY N/A 01/14/2023    Procedure: LAPAROTOMY, EXPLORATORY;  Surgeon: Manuel Javier MD;  Location: [facility] OR;  Service: General;  Laterality: N/A;    LAPAROTOMY, EXPLORATORY N/A 01/16/2023    Procedure: LAPAROTOMY, EXPLORATORY;  Surgeon: Manuel Javier MD;  Location: [facility] OR;  Service: General;  Laterality: N/A;    LEFT HEART CATHETERIZATION Left 02/21/2024    Procedure: Left heart cath;  Surgeon: Todd Carlisle MD;  Location: [facility] CATH LAB;  Service: Cardiology;  Laterality: Left;    LYMPHADENECTOMY Right 01/02/2024    Procedure: LYMPHADENECTOMY;  Surgeon: Tan Thomson MD;  [facility];  Service: Cardiothoracic;  Laterality: Right;    PACEMAKER, TEMPORARY, TRANSVENOUS, PEDIATRIC Right 01/12/2024    Procedure: Pacemaker, Temporary, Transvenous;  Surgeon: Todd Carlisle MD;  Location: [facility] CATH LAB;  Service: Cardiology;  Laterality: Right;    REMOVAL OF CATHETER Right 01/28/2023    Procedure: REMOVAL, CATHETER;  Surgeon: Watson Fontenot MD;  Location: [facility] OR;  Service: General;  Laterality: Right;    REMOVAL, TUNNELED CATH Right 01/25/2023    Procedure: REMOVAL, TUNNELED CATH;  Surgeon: Manuel Javier MD;  Location: [facility] OR;  Service: General;  Laterality: Right;    ROBOTIC BRONCHOSCOPY N/A 10/20/2023    Procedure: ROBOTIC BRONCHOSCOPY;  Surgeon: Mayda Monzon MD;  Location: [facility] OR 2ND FLR;  Service: Pulmonary;  Laterality: N/A;    SHOULDER SURGERY  2009 & 2010    right rotator cuff    THORACOTOMY Right 01/02/2024    Procedure: THORACOTOMY;  Surgeon: Tan Thomson MD;  Location: [facility] OR 2ND FLR;  Service: Cardiothoracic;  Laterality: Right;    THORACOTOMY, WITH INITIAL THERAPEUTIC WEDGE RESECTION OF LUNG Right 01/02/2024    Procedure: THORACOTOMY, WITH INITIAL THERAPEUTIC WEDGE RESECTION OF LUNG;  Surgeon: Tan Thomson,  MD;  Location: [facility] OR [address] [place] [address];  Service: Cardiothoracic;  Laterality: Right;    TONSILLECTOMY      UPPER GASTROINTESTINAL ENDOSCOPY  04/2018       Family History   Problem Relation Name Age of Onset    Vision loss Father Shin CervantesJr.     Arthritis Father Shin CervantesJr.     Breast cancer Mother Megan Cervantes     Cancer Mother Megan Cervantes     Vision loss Mother Megan Cervantes     Hyperlipidemia Sister Sweetie Saha     Breast cancer Paternal Aunt Ibis Durbin     Cancer Paternal Aunt Ibis Durbin     Diabetes Paternal Grandfather Sihn Cervantes Sr.     Vision loss Sister Noreen Saha     Kidney disease Neg Hx         Social History     Socioeconomic History    Marital status:    Tobacco Use    Smoking status: Never    Smokeless tobacco: Never   Substance and Sexual Activity    Alcohol use: Not Currently     Comment: Use to drink. Haven't had a drink in the past 10 years.    Drug use: Never    Sexual activity: Not Currently     Partners: Male     Birth control/protection: See Surgical Hx     Comment: :      Social Drivers of Health     Financial Resource Strain: Low Risk  (3/1/2025)    Overall Financial Resource Strain (CARDIA)     Difficulty of Paying Living Expenses: Not very hard   Food Insecurity: No Food Insecurity (3/1/2025)    Hunger Vital Sign     Worried About Running Out of Food in the Last Year: Never true     Ran Out of Food in the Last Year: Never true   Transportation Needs: No Transportation Needs (2/18/2025)    PRAPARE - Transportation     Lack of Transportation (Medical): No     Lack of Transportation (Non-Medical): No   Physical Activity: Inactive (3/1/2025)    Exercise Vital Sign     Days of Exercise per Week: 0 days     Minutes of Exercise per Session: 0 min   Stress: Stress Concern Present (3/1/2025)    [place] [employer] - Occupational Stress Questionnaire     Feeling of Stress : To some extent   Housing Stability: Low Risk  (3/1/2025)     "Housing Stability Vital Sign     Unable to Pay for Housing in the Last Year: No     Number of Times Moved in the Last Year: 0     Homeless in the Last Year: No       Current Outpatient Medications   Medication Sig Dispense Refill    amiodarone (PACERONE) 200 MG Tab Take 1 tablet (200 mg total) by mouth 2 (two) times daily. 180 tablet 3    aspirin (ECOTRIN) 81 MG EC tablet Take 1 tablet by mouth every morning.      atorvastatin (LIPITOR) 40 MG tablet Take 1 tablet (40 mg total) by mouth every evening. 90 tablet 3    beta carotene 97372 UNIT capsule Take 1 capsule by mouth every morning.      blood sugar diagnostic (TRUE METRIX GLUCOSE TEST STRIP) Strp USE ONE STRIP FOR TESTING 2 TIMES A  each 3    calcium carbonate-vitamin D3 500 mg-10 mcg (400 unit) Tab Take 1 tablet by mouth once daily.      carvediloL (COREG) 6.25 MG tablet Take 1 tablet (6.25 mg total) by mouth 2 (two) times daily with meals. 180 tablet 3    insulin aspart U-100 (NOVOLOG) 100 unit/mL (3 mL) InPn pen Inject before meals with scale:  201-250=+2, 251-300=+3; 301-350=+4, over 350=+5 units. Max daily dose 10 units 15 mL 0    lancets (TRUEPLUS LANCETS) 30 gauge Misc USE ONE LANCET FOR TESTING FOUR TIMES DAILY 200 each 5    levothyroxine (SYNTHROID) 112 MCG tablet Take 1 tablet (112 mcg total) by mouth before breakfast. 90 tablet 3    linaGLIPtin (TRADJENTA) 5 mg Tab tablet Take 1 tablet (5 mg total) by mouth once daily. 90 tablet 1    loperamide (IMODIUM) 2 mg capsule Take 2 mg by mouth once as needed for Diarrhea.      midodrine (PROAMATINE) 10 MG tablet Take 1 tablet (10 mg total) by mouth every 8 (eight) hours. Can also take an extra dose with dialysis 120 tablet 3    pantoprazole (PROTONIX) 40 MG tablet Take 1 tablet (40 mg total) by mouth once daily. 30 tablet 2    pen needle, diabetic (BD ULTRA-FINE MARIS PEN NEEDLE) 32 gauge x 5/32" Ndle 1 Device by Misc.(Non-Drug; Combo Route) route 2 (two) times a day. 200 each 3    pregabalin (LYRICA) " 75 MG capsule TAKE ONE CAPSULE BY MOUTH EVERY OTHER DAY 45 capsule 3    MELLISSA-BENJY RX 1- mg-mg-mcg Tab Take 1 tablet by mouth once daily. 90 tablet 4    sevelamer carbonate (RENVELA) 800 mg Tab Take 1 tablet (800 mg total) by mouth 3 (three) times daily with meals. . 270 tablet 4    torsemide (DEMADEX) 20 MG Tab Take 1 tablet (20 mg total) by mouth 2 (two) times daily. 180 tablet 3    valsartan (DIOVAN) 40 MG tablet Take 0.5 tablets (20 mg total) by mouth 2 (two) times daily. 30 tablet 11    venlafaxine (EFFEXOR-XR) 37.5 MG 24 hr capsule Take 1 capsule (37.5 mg total) by mouth once daily. 90 capsule 3    B-complex with vitamin C (Z-BEC OR EQUIV) tablet Take 1 tablet by mouth every morning. (Patient not taking: Reported on 2/27/2025)      blood-glucose meter kit Use to test twice a day 1 each 0    HYDROcodone-acetaminophen (NORCO) 7.5-325 mg per tablet Take 1 tablet by mouth 4 (four) times daily as needed. (Patient not taking: Reported on 4/23/2025)      lancets Misc 1 lancet by Misc.(Non-Drug; Combo Route) route 4 (four) times daily. 400 each 3     No current facility-administered medications for this visit.       Review of patient's allergies indicates:   Allergen Reactions    Crestor [rosuvastatin] Swelling    Gluten protein          Review of Systems   Constitutional: Negative for chills and fever.   Cardiovascular:  Negative for claudication and leg swelling.   Skin:  Positive for color change and nail changes. Negative for suspicious lesions.   Musculoskeletal:  Positive for joint pain. Negative for joint swelling, muscle cramps and muscle weakness.   Gastrointestinal:  Negative for nausea and vomiting.   Neurological:  Positive for numbness and paresthesias.   Psychiatric/Behavioral:  Negative for altered mental status.            Objective:      Physical Exam  Constitutional:       General: She is not in acute distress.     Appearance: She is well-developed. She is not diaphoretic.   Cardiovascular:       "Pulses:           Dorsalis pedis pulses are 2+ on the right side and 2+ on the left side.        Posterior tibial pulses are 1+ on the right side and 1+ on the left side.      Comments: CFT is < 3 seconds bilateral.  Pedal hair growth is decreased bilateral.  Mild non pitting lower extremity edema noted bilateral.  Toes are cool to touch bilateral.  Musculoskeletal:         General: Deformity present. No tenderness.      Comments: Muscle strength 5/5 in all muscle groups bilateral.   (-) for pain with palpation of bilateral foot and ankle. Semi-reducible contracture of toes 2-5 bilateral with adductovarus rotation of the 5th.   Skin:     General: Skin is warm and dry.      Capillary Refill: Capillary refill takes less than 2 seconds.      Coloration: Skin is not pale.      Findings: Lesion present. No abrasion, bruising, burn, ecchymosis, erythema, petechiae or rash.      Comments: Pedal skin appears thin bilateral.  Toenails x 10 appear thickened by 2 mm, elongated by 8 mm, and discolored with subungual debris.  Slight incurvation noted to the medial and lateral borders of bilateral hallux toenail.  No localized sign of infection noted.  Hyperkeratotic lesion noted to bilateral sub 5th met head and plantar medial hallux.   Neurological:      Mental Status: She is alert and oriented to person, place, and time.      Sensory: Sensory deficit present.      Motor: No weakness or atrophy.      Comments: Protective sensation per [place] monofilament is decreased bilateral.  Vibratory sensation is decreased bilateral.  Light touch is intact bilateral.               Assessment:       Encounter Diagnoses   Name Primary?    Diabetic polyneuropathy associated with type 2 diabetes mellitus Yes    Hammer toes of both feet     Onychomycosis due to dermatophyte     Corn or callus            Plan:       Lily Montesinos" was seen today for foot pain.    Diagnoses and all orders for this visit:    Diabetic polyneuropathy " associated with type 2 diabetes mellitus    Hammer toes of both feet    Onychomycosis due to dermatophyte    Corn or callus        I counseled the patient on her conditions, their implications and medical management.      To continue wearing shoe gear that minimizes pressure to the digital deformities.  Advised to possibly have the lateral portion of her Rt. Shoe stretched to better accommodate for prominence of the styloid process.    Shoe inspection. Diabetic Foot Education. Patient reminded of the importance of good nutrition and blood sugar control to help prevent podiatric complications of diabetes. Patient instructed on proper foot hygeine. We discussed wearing proper shoe gear, daily foot inspections, never walking without protective shoe gear, never putting sharp instruments to feet    With patient's permission, nails were aggressively reduced and debrided x 10 to their soft tissue attachment mechanically and with electric , removing all offending nail and debris.  Also, a sterile # 15 scalpel was used to trim lesions x 4 down to smooth appearing skin without incident.  Patient relates relief following the procedure. She will continue to monitor the areas daily, inspect her feet, wear protective shoe gear when ambulatory, moisturizer to maintain skin integrity and follow in this office in approximately 3 months, sooner p.r.ria.      Lucas Bryan DPM

## 2025-04-29 RX ORDER — CARVEDILOL 6.25 MG/1
6.25 TABLET ORAL 2 TIMES DAILY
Qty: 60 TABLET | Refills: 1 | Status: SHIPPED | OUTPATIENT
Start: 2025-04-29

## 2025-05-05 DIAGNOSIS — E11.42 TYPE 2 DIABETES MELLITUS WITH DIABETIC POLYNEUROPATHY, WITHOUT LONG-TERM CURRENT USE OF INSULIN: Primary | ICD-10-CM

## 2025-05-05 RX ORDER — LINAGLIPTIN 5 MG/1
5 TABLET, FILM COATED ORAL
Qty: 90 TABLET | Refills: 3 | Status: SHIPPED | OUTPATIENT
Start: 2025-05-05

## 2025-05-09 ENCOUNTER — OFFICE VISIT (OUTPATIENT)
Dept: ELECTROPHYSIOLOGY | Facility: CLINIC | Age: 75
End: 2025-05-09
Payer: MEDICARE

## 2025-05-09 ENCOUNTER — HOSPITAL ENCOUNTER (OUTPATIENT)
Dept: CARDIOLOGY | Facility: CLINIC | Age: 75
Discharge: HOME | End: 2025-05-09
Payer: MEDICARE

## 2025-05-09 ENCOUNTER — CLINICAL SUPPORT (OUTPATIENT)
Dept: CARDIOLOGY | Facility: HOSPITAL | Age: 75
End: 2025-05-09
Attending: STUDENT IN AN ORGANIZED HEALTH CARE EDUCATION/TRAINING PROGRAM
Payer: MEDICARE

## 2025-05-09 VITALS
DIASTOLIC BLOOD PRESSURE: 57 MMHG | BODY MASS INDEX: 20.58 KG/M2 | WEIGHT: 123.69 LBS | HEART RATE: 44 BPM | SYSTOLIC BLOOD PRESSURE: 101 MMHG

## 2025-05-09 DIAGNOSIS — Z79.899 ENCOUNTER FOR MONITORING AMIODARONE THERAPY: ICD-10-CM

## 2025-05-09 DIAGNOSIS — E78.2 MIXED HYPERLIPIDEMIA: ICD-10-CM

## 2025-05-09 DIAGNOSIS — I48.3 TYPICAL ATRIAL FLUTTER: ICD-10-CM

## 2025-05-09 DIAGNOSIS — N18.6 ESRD (END STAGE RENAL DISEASE) ON DIALYSIS: ICD-10-CM

## 2025-05-09 DIAGNOSIS — M15.9 OSTEOARTHRITIS OF MULTIPLE JOINTS, UNSPECIFIED OSTEOARTHRITIS TYPE: ICD-10-CM

## 2025-05-09 DIAGNOSIS — F41.1 GENERALIZED ANXIETY DISORDER: ICD-10-CM

## 2025-05-09 DIAGNOSIS — I48.0 PAROXYSMAL ATRIAL FIBRILLATION: Primary | ICD-10-CM

## 2025-05-09 DIAGNOSIS — I42.8 NONISCHEMIC CARDIOMYOPATHY: ICD-10-CM

## 2025-05-09 DIAGNOSIS — I48.0 PAROXYSMAL ATRIAL FIBRILLATION: ICD-10-CM

## 2025-05-09 DIAGNOSIS — K55.9 LARGE BOWEL ISCHEMIA: ICD-10-CM

## 2025-05-09 DIAGNOSIS — C7A.090 MALIGNANT CARCINOID TUMOR OF LUNG: ICD-10-CM

## 2025-05-09 DIAGNOSIS — I50.42 CHRONIC COMBINED SYSTOLIC AND DIASTOLIC HEART FAILURE: ICD-10-CM

## 2025-05-09 DIAGNOSIS — Z95.0 PACEMAKER: ICD-10-CM

## 2025-05-09 DIAGNOSIS — Z51.81 ENCOUNTER FOR MONITORING AMIODARONE THERAPY: ICD-10-CM

## 2025-05-09 DIAGNOSIS — D63.1 ANEMIA IN ESRD (END-STAGE RENAL DISEASE): ICD-10-CM

## 2025-05-09 DIAGNOSIS — K90.0 CELIAC DISEASE: ICD-10-CM

## 2025-05-09 DIAGNOSIS — N18.6 ANEMIA IN ESRD (END-STAGE RENAL DISEASE): ICD-10-CM

## 2025-05-09 DIAGNOSIS — E03.9 ACQUIRED HYPOTHYROIDISM: ICD-10-CM

## 2025-05-09 DIAGNOSIS — K21.9 GERD WITHOUT ESOPHAGITIS: ICD-10-CM

## 2025-05-09 DIAGNOSIS — I77.0 A-V FISTULA: ICD-10-CM

## 2025-05-09 DIAGNOSIS — I50.32 HEART FAILURE WITH RECOVERED EJECTION FRACTION (HFRECEF): ICD-10-CM

## 2025-05-09 DIAGNOSIS — I49.5 TACHYCARDIA-BRADYCARDIA SYNDROME: Primary | ICD-10-CM

## 2025-05-09 DIAGNOSIS — N18.6 ESRD (END STAGE RENAL DISEASE): ICD-10-CM

## 2025-05-09 DIAGNOSIS — C7A.090 MALIGNANT CARCINOID TUMOR OF BRONCHUS: ICD-10-CM

## 2025-05-09 DIAGNOSIS — E11.42 TYPE 2 DIABETES MELLITUS WITH DIABETIC POLYNEUROPATHY, WITHOUT LONG-TERM CURRENT USE OF INSULIN: ICD-10-CM

## 2025-05-09 DIAGNOSIS — Z99.2 ESRD (END STAGE RENAL DISEASE) ON DIALYSIS: ICD-10-CM

## 2025-05-09 DIAGNOSIS — I10 PRIMARY HYPERTENSION: ICD-10-CM

## 2025-05-09 DIAGNOSIS — R00.1 SYMPTOMATIC BRADYCARDIA: ICD-10-CM

## 2025-05-09 DIAGNOSIS — F33.0 MILD RECURRENT MAJOR DEPRESSION: ICD-10-CM

## 2025-05-09 DIAGNOSIS — K27.9 PEPTIC ULCER DISEASE: ICD-10-CM

## 2025-05-09 LAB
OHS QRS DURATION: 104 MS
OHS QTC CALCULATION: 454 MS

## 2025-05-09 PROCEDURE — 99999 PR PBB SHADOW E&M-EST. PATIENT-LVL III: CPT | Mod: PBBFAC,,, | Performed by: STUDENT IN AN ORGANIZED HEALTH CARE EDUCATION/TRAINING PROGRAM

## 2025-05-09 PROCEDURE — 93010 ELECTROCARDIOGRAM REPORT: CPT | Mod: S$GLB,,, | Performed by: INTERNAL MEDICINE

## 2025-05-09 PROCEDURE — 93005 ELECTROCARDIOGRAM TRACING: CPT | Mod: S$GLB,,, | Performed by: STUDENT IN AN ORGANIZED HEALTH CARE EDUCATION/TRAINING PROGRAM

## 2025-05-09 NOTE — PROGRESS NOTES
Electrophysiology Clinic Note    Reason for follow-up patient visit: Ongoing evaluation and routine device surveillance of a Medtronic Micra AV leadless pacemaker, implanted on 1/16/2024 in the setting of tachycardia-bradycardia syndrome with persistent atrial fibrillation and periods of symptomatic bradycardia with prolonged pauses.     PRESENTING HISTORY:     History of Present Illness:  Ms. Lily Green is a disha 74-year-old woman who returns to clinic today for ongoing evaluation and routine device surveillance of a Medtronic Micra AV leadless pacemaker, implanted on 1/16/2024 in the setting of tachycardia-bradycardia syndrome with persistent atrial fibrillation and periods of symptomatic bradycardia with prolonged pauses. She has a past medical history significant for tachycardia-bradycardia syndrome with persistent atrial fibrillation and periods of symptomatic bradycardia, a history of nonischemic cardiomyopathy with her most recent LVEF 55-60%, moderate mitral valve regurgitation, hypertension, hyperlipidemia, type II diabetes mellitus, celiac disease, a right middle lobe carcinoid tumor s/p robotic-assisted converted to right thoracotomy with therapeutic wedge resection and mediastinal lymph node dissection and repair of bleeding right inferior pulmonary vein on 1/2/2024, end-stage renal disease maintained on hemodialysis via a left AV fistula, peptic ulcer disease, a history of peritonitis due to colonic ischemia s/p a total colectomy and end ileostomy creation on 1/14/2023, evacuation of an intraabdominal hematoma and takedown of left lateral ileostomy and recreation of right lateral end ileostomy on 1/16/2023, elective robotic ileostomy reversal and ileocolonic anastomosis on 8/7/2023, GERD, anxiety, depression, hypothyroidism, and low BMI.     She was previously admitted from 1/2/2024 - 2/6/2024 in order to undergo a flexible bronchoscopy with robotic-assisted surgery that was converted to  a right thoracotomy with a wedge resection of her carcinoid tumor, with open mediastinal lymph node dissection with Pippa Thosmon and Reva. She was noted to have difficult to control postoperative atrial fibrillation with RVR and was initiated on amiodarone, beta-blockade, and digoxin. Unfortunately, she was noted to have several bradycardic events with asystole, requiring placement of a temporary pacing wire. 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 We discussed the indication for undergoing implantation of a Medtronic Micra AV leadless pacemaker. This procedure was successfully performed on 1/16/2024. She was readmitted on 2/12/2024 from her skilled nursing facility with increased work of breathing, dyspnea, and hypoxia with oxygen saturation of 70% on 2 liters/minute of supplemental oxygen. She underwent emergency dialysis with improved volume status. A CXR showed pleural effusions and possible multifocal pneumonia. A repeat echocardiogram revealed a new reduced systolic ejection fraction of 15-20%, with development of atrial fibrillation with RVR on 2/13/2024. She was started on an amiodarone infusion, in addition to continued oral anticoagulation with apixaban. She underwent slow low-efficiency dialysis (SLED). A CT revealed fluid collections in the right paraesophageal space and azygoesophageal recess, with recommendations for Interventional Radiology for drainage; however, there was no safe window for approach. Interventional Cardiology initially deferred angiography due to her respiratory failure. A thoracentesis was performed on 2/15/2024 removing 1.45 liters, which was sent for analysis. She subsequently developed re-expansion pulmonary edema and was started on Airvo high flow oxygen; this was able to be successfully weaned off.  Interventional Cardiology was reconsulted, and a coronary angiogram revealed luminal irregularities. She was subsequently seen with the Advanced Heart Failure team for management of her nonischemic cardiomyopathy. Interventional Radiology performed a second thoracentesis with removal of 725 mL of pleural fluid on 2/27/2024. A chest CTA showed a new left lower lobe pneumonia, and she was prescribed a course of doxycycline and cefpodoxime. She was subsequently discharged to her skilled nursing facility on 3/1/2024. She was briefly readmitted from 3/26/2024 - 3/29/2024 with hypokalemia. She has been readmitted twice so far this year from 1/28-29/2025 in the setting of a GI bleed noted during a previous dialysis session with outpatient GI referral, and again on 3/1/2025 with an upper respiratory tract infection. She reports symptoms of fatigue at her dialysis sessions, and is often told that her heart rate is in the 50s and upper 40s.         In discussion with Ms. Green today, she tells me that she is feeling overall quite well and has recovered well following her recent URTI. She presents with her  in a wheelchair. She denies any episodes of dizziness, lightheadedness, syncope, or presyncope, but occasionally reports transient dizziness with abrupt positional changes. She denies any palpitations, chest pain or chest discomfort, nausea or vomiting, orthopnea, or PND. She reports baseline shortness of breath and dyspnea with exertion that she feels has remained stable, with a chronic cough. She uses her rolling walker for ambulation when she is at home. She reports that at her dialysis sessions she occasionally has periods of hypotension.      Review of Systems:  Review of Systems   Constitutional:  Positive for fatigue. Negative for activity change.        Fatigue noted during her hemodialysis sessions.    HENT:  Negative for nasal congestion, nosebleeds, postnasal drip, rhinorrhea, sinus pressure/congestion,  sneezing and sore throat.    Respiratory:  Positive for shortness of breath. Negative for apnea, cough, chest tightness and wheezing.    Cardiovascular:  Positive for leg swelling. Negative for chest pain and palpitations.   Gastrointestinal:  Positive for reflux. Negative for abdominal distention, abdominal pain, blood in stool, change in bowel habit, constipation, diarrhea, nausea and vomiting.   Genitourinary:  Negative for dysuria and hematuria.   Musculoskeletal:  Positive for arthralgias, back pain and gait problem.   Neurological:  Positive for dizziness and light-headedness. Negative for seizures, syncope, weakness, headaches and coordination difficulties.        PAST HISTORY:     Past Medical History:   Diagnosis Date    Anxiety     Celiac disease 2018    Celiac disease     Depression     Diabetes mellitus     Family history of breast cancer in mother      at age 68    Hyperlipidemia     Hypertension     Hypertensive retinopathy of both eyes 2024    Meniere disease     Meniere's disease, unspecified ear     Menopause     Moderate nonproliferative diabetic retinopathy of both eyes 2024    On supplemental oxygen by nasal cannula 2023    Hypoxic respiratory failure resolved.  No oxygen desaturation on walk.        Peptic ulcer     Peritonitis 2023    Pleural effusion on left 01/15/2024    C/w transudate in setting of acute decompensated heart failure due to a-fib and volume overload.  Now appears euvolemic in clinic today, resolved.       Reflux esophagitis     Urinary tract infection     Vaginal infection     Vaginal Pap smear 2014    Pap/Hpv Negative        Past Surgical History:   Procedure Laterality Date    APPENDECTOMY      BREAST SURGERY      Tags    CARPAL TUNNEL RELEASE Bilateral 2017    ,     CLOSURE, COLOSTOMY Left 2023    Procedure: CLOSURE, COLOSTOMY;  Surgeon: Manuel Javier MD;  Location: [facility];  Service: General;  Laterality: Left;     COLONOSCOPY  04/2018    Normal  ( Juan A)     COLOSTOMY Right 01/16/2023    Procedure: CREATION, COLOSTOMY;  Surgeon: Manuel Javier MD;  Location: [facility] OR;  Service: General;  Laterality: Right;    CORONARY ANGIOGRAPHY N/A 02/21/2024    Procedure: ANGIOGRAM, CORONARY ARTERY;  Surgeon: Todd Carlisle MD;  Location: [facility] CATH LAB;  Service: Cardiology;  Laterality: N/A;    DILATION AND CURETTAGE OF UTERUS  1986    DUPUYTREN CONTRACTURE RELEASE Bilateral 09/2017    ESOPHAGOGASTRODUODENOSCOPY N/A 01/24/2024    Procedure: EGD (ESOPHAGOGASTRODUODENOSCOPY);  Surgeon: Yamilet Walters MD;  Location: [facility] ENDO (2ND FLR);  Service: Gastroenterology;  Laterality: N/A;    ESOPHAGOGASTRODUODENOSCOPY N/A 01/26/2024    Procedure: EGD (ESOPHAGOGASTRODUODENOSCOPY);  Surgeon: Yamilet Walters MD;  Location: [facility] ENDO (2ND FLR);  Service: Gastroenterology;  Laterality: N/A;    HYSTERECTOMY  1987    BEAU w/ appy, no BSO     IMPLANTATION OF LEADLESS PACEMAKER N/A 01/16/2024    Procedure: INSERTION, CARDIAC PACEMAKER, LEADLESS;  Surgeon: LENIN Frances MD;  Location: [facility] EP LAB;  Service: Cardiology;  Laterality: N/A;  SB, LEADLESS PPM (MICRA), MDT, ANES, EH, CVICU [id number]    INJECTION OF NEUROLYTIC AGENT AROUND LUMBAR SYMPATHETIC NERVE N/A 01/06/2022    Procedure: BLOCK, LUMBAR SYMPATHETIC;  Surgeon: Souleymane Rizo Jr., MD;  Location: [facility] PAIN MGT;  Service: Pain Management;  Laterality: N/A;  no pacemaker   pt is diabetic     INJECTION OF NEUROLYTIC AGENT AROUND LUMBAR SYMPATHETIC NERVE N/A 08/25/2022    Procedure: BLOCK, LUMBAR SYMPATHETIC;  Surgeon: Souleymane Rizo Jr., MD;  Location: [facility] PAIN MGT;  Service: Pain Management;  Laterality: N/A;  diabetic     INSERTION OF TUNNELED CENTRAL VENOUS HEMODIALYSIS CATHETER Right 01/25/2023    Procedure: INSERTION, CATHETER, HEMODIALYSIS, DUAL LUMEN;  Surgeon: Manuel Javier MD;  Location: [facility] OR;  Service: General;  Laterality: Right;    INSERTION, CATHETER,  TUNNELED Left 01/28/2023    Procedure: Insertion,catheter,tunneled;  Surgeon: Watson Fontenot MD;  Location: [facility] OR;  Service: General;  Laterality: Left;    LAPAROTOMY, EXPLORATORY N/A 01/14/2023    Procedure: LAPAROTOMY, EXPLORATORY;  Surgeon: Manuel Javier MD;  Location: [facility] OR;  Service: General;  Laterality: N/A;    LAPAROTOMY, EXPLORATORY N/A 01/16/2023    Procedure: LAPAROTOMY, EXPLORATORY;  Surgeon: Manuel Javier MD;  Location: [facility] OR;  Service: General;  Laterality: N/A;    LEFT HEART CATHETERIZATION Left 02/21/2024    Procedure: Left heart cath;  Surgeon: Todd Carlisle MD;  Location: [facility] CATH LAB;  Service: Cardiology;  Laterality: Left;    LYMPHADENECTOMY Right 01/02/2024    Procedure: LYMPHADENECTOMY;  Surgeon: Tan Thomson MD;  Location: [facility] OR 2ND FLR;  Service: Cardiothoracic;  Laterality: Right;    PACEMAKER, TEMPORARY, TRANSVENOUS, PEDIATRIC Right 01/12/2024    Procedure: Pacemaker, Temporary, Transvenous;  Surgeon: Todd Carlisle MD;  Location: [facility] CATH LAB;  Service: Cardiology;  Laterality: Right;    REMOVAL OF CATHETER Right 01/28/2023    Procedure: REMOVAL, CATHETER;  Surgeon: Watson Fontenot MD;  Location: [facility] OR;  Service: General;  Laterality: Right;    REMOVAL, TUNNELED CATH Right 01/25/2023    Procedure: REMOVAL, TUNNELED CATH;  Surgeon: Manuel Javier MD;  Location: [facility] OR;  Service: General;  Laterality: Right;    ROBOTIC BRONCHOSCOPY N/A 10/20/2023    Procedure: ROBOTIC BRONCHOSCOPY;  Surgeon: Mayda Monzon MD;  Location: [facility] OR 2ND FLR;  Service: Pulmonary;  Laterality: N/A;    SHOULDER SURGERY  2009 & 2010    right rotator cuff    THORACOTOMY Right 01/02/2024    Procedure: THORACOTOMY;  Surgeon: Tan Thomson MD;  Location: [facility] OR 2ND FLR;  Service: Cardiothoracic;  Laterality: Right;    THORACOTOMY, WITH INITIAL THERAPEUTIC WEDGE RESECTION OF LUNG Right 01/02/2024    Procedure: THORACOTOMY, WITH INITIAL THERAPEUTIC WEDGE RESECTION  OF LUNG;  Surgeon: Tan Thomson MD;  Location: [facility] OR [address] [place] [address];  Service: Cardiothoracic;  Laterality: Right;    TONSILLECTOMY      UPPER GASTROINTESTINAL ENDOSCOPY  04/2018       Family History:  Family History   Problem Relation Name Age of Onset    Vision loss Father Shin Cervantes .     Arthritis Father Shin Cervantes .     Breast cancer Mother Megan Cervantes     Cancer Mother Megan Cervantes     Vision loss Mother Megan Cervantes     Hyperlipidemia Sister Sweetie Saha     Breast cancer Paternal Aunt Ibis Durbin     Cancer Paternal Aunt Ibis Durbin     Diabetes Paternal Grandfather Shin Cervantes, .     Vision loss Sister Noreen Saha     Kidney disease Neg Hx         Social History:  She  reports that she has never smoked. She has never used smokeless tobacco. She reports that she does not currently use alcohol. She reports that she does not use drugs.      MEDICATIONS & ALLERGIES:     Review of patient's allergies indicates:   Allergen Reactions    Crestor [rosuvastatin] Swelling    Gluten protein        Current Facility-Administered Medications on File Prior to Visit   Medication Dose Route Frequency Provider Last Rate Last Admin    [COMPLETED] lactated ringers bolus 1,674 mL  30 mL/kg Intravenous ED 1 Time Jose Pleitez MD   Stopped at 05/08/25 1653    [COMPLETED] midodrine tablet 10 mg  10 mg Oral ED 1 Time Jose Pleitez MD   10 mg at 05/08/25 1531    [DISCONTINUED] lactated ringers bolus 1,000 mL  1,000 mL Intravenous ED 1 Time Jose Pleitez MD         Current Outpatient Medications on File Prior to Visit   Medication Sig Dispense Refill    amiodarone (PACERONE) 200 MG Tab Take 1 tablet (200 mg total) by mouth 2 (two) times daily. 180 tablet 3    aspirin (ECOTRIN) 81 MG EC tablet Take 1 tablet by mouth every morning.      atorvastatin (LIPITOR) 40 MG tablet Take 1 tablet (40 mg total) by mouth every evening. 90 tablet 3    B-complex with vitamin C (Z-BEC OR EQUIV) tablet Take 1  "tablet by mouth every morning. (Patient not taking: Reported on 2/27/2025)      beta carotene 42128 UNIT capsule Take 1 capsule by mouth every morning.      blood sugar diagnostic (TRUE METRIX GLUCOSE TEST STRIP) Strp USE ONE STRIP FOR TESTING 2 TIMES A  each 3    blood-glucose meter kit Use to test twice a day 1 each 0    calcium carbonate-vitamin D3 500 mg-10 mcg (400 unit) Tab Take 1 tablet by mouth once daily.      carvediloL (COREG) 6.25 MG tablet TAKE 1 TABLET(6.25 MG) BY MOUTH TWICE DAILY WITH MEALS 60 tablet 1    HYDROcodone-acetaminophen (NORCO) 7.5-325 mg per tablet Take 1 tablet by mouth 4 (four) times daily as needed. (Patient not taking: Reported on 4/23/2025)      insulin aspart U-100 (NOVOLOG) 100 unit/mL (3 mL) InPn pen Inject before meals with scale:  201-250=+2, 251-300=+3; 301-350=+4, over 350=+5 units. Max daily dose 10 units 15 mL 0    lancets (TRUEPLUS LANCETS) 30 gauge Misc USE ONE LANCET FOR TESTING FOUR TIMES DAILY 200 each 5    lancets Misc 1 lancet by Misc.(Non-Drug; Combo Route) route 4 (four) times daily. 400 each 3    levothyroxine (SYNTHROID) 112 MCG tablet Take 1 tablet (112 mcg total) by mouth before breakfast. 90 tablet 3    loperamide (IMODIUM) 2 mg capsule Take 2 mg by mouth once as needed for Diarrhea.      midodrine (PROAMATINE) 10 MG tablet Take 1 tablet (10 mg total) by mouth every 8 (eight) hours. Can also take an extra dose with dialysis 120 tablet 3    pantoprazole (PROTONIX) 40 MG tablet Take 1 tablet (40 mg total) by mouth once daily. 30 tablet 2    pen needle, diabetic (BD ULTRA-FINE MARIS PEN NEEDLE) 32 gauge x 5/32" Ndle 1 Device by Misc.(Non-Drug; Combo Route) route 2 (two) times a day. 200 each 3    pregabalin (LYRICA) 75 MG capsule TAKE ONE CAPSULE BY MOUTH EVERY OTHER DAY 45 capsule 3    MELLISSA-BENJY RX 1- mg-mg-mcg Tab Take 1 tablet by mouth once daily. 90 tablet 4    sevelamer carbonate (RENVELA) 800 mg Tab Take 1 tablet (800 mg total) by mouth 3 (three) " times daily with meals. . 270 tablet 4    torsemide (DEMADEX) 20 MG Tab Take 1 tablet (20 mg total) by mouth 2 (two) times daily. 180 tablet 3    TRADJENTA 5 mg Tab tablet TAKE 1 TABLET(5 MG) BY MOUTH DAILY 90 tablet 3    valsartan (DIOVAN) 40 MG tablet Take 0.5 tablets (20 mg total) by mouth 2 (two) times daily. 30 tablet 11    venlafaxine (EFFEXOR-XR) 37.5 MG 24 hr capsule Take 1 capsule (37.5 mg total) by mouth once daily. 90 capsule 3        OBJECTIVE:     Vital Signs:  BP (!) 101/57 (Patient Position: Sitting)   Pulse (!) 44   Wt 56.1 kg (123 lb 10.9 oz)   LMP  (LMP Unknown) Comment: BEAU/ NO BSO  1986  BMI 20.58 kg/m²     Physical Exam:  Physical Exam  Constitutional:       General: She is not in acute distress.     Appearance: Normal appearance. She is not ill-appearing or diaphoretic.      Comments: Well-appearing, elderly, thin woman in NAD, presenting in a wheelchair.    HENT:      Head: Normocephalic and atraumatic.      Nose: Nose normal.      Mouth/Throat:      Mouth: Mucous membranes are moist.      Pharynx: Oropharynx is clear.   Eyes:      Pupils: Pupils are equal, round, and reactive to light.   Cardiovascular:      Rate and Rhythm: Regular rhythm. Bradycardia present.      Pulses: Normal pulses.      Heart sounds: Normal heart sounds. No murmur heard.     No friction rub. No gallop.   Pulmonary:      Effort: Pulmonary effort is normal. No respiratory distress.      Breath sounds: Normal breath sounds. No wheezing, rhonchi or rales.   Chest:      Chest wall: No tenderness.   Abdominal:      General: There is no distension.      Palpations: Abdomen is soft.      Tenderness: There is no abdominal tenderness.   Musculoskeletal:         General: No swelling or tenderness.      Cervical back: Normal range of motion.      Right lower leg: Edema present.      Left lower leg: Edema present.      Comments: Left upper extremity fistula. Trace bilateral pedal edema.    Skin:     General: Skin is warm and  dry.      Findings: No erythema, lesion or rash.   Neurological:      General: No focal deficit present.      Mental Status: She is alert and oriented to person, place, and time. Mental status is at baseline.      Motor: No weakness.      Gait: Gait normal.   Psychiatric:         Mood and Affect: Mood normal.         Behavior: Behavior normal.        Laboratory Data:  Lab Results   Component Value Date    WBC 8.05 05/08/2025    HGB 14.1 05/08/2025    HCT 44.6 05/08/2025     (H) 05/08/2025     05/08/2025     Lab Results   Component Value Date     (H) 05/08/2025     (L) 05/08/2025    K 4.3 05/08/2025    CL 94 (L) 05/08/2025    CO2 29 05/08/2025    BUN 19 05/08/2025    CREATININE 4.3 (H) 05/08/2025    CALCIUM 9.6 05/08/2025    MG 2.5 05/08/2025     Lab Results   Component Value Date    INR 1.1 01/28/2025    INR 1.3 (H) 01/23/2024    INR 1.1 01/22/2024       Pertinent Cardiac Data:  Personal interpretation of today's ECG: Sinus bradycardia with rate of 44 bpm,  ms,  ms, QT/QTc 532/454 ms, nonspecific STT changes.     Resting 2D Transthoracic Echocardiogram - 1/14/2023:  Concentric hypertrophy and  Normal right ventricular size with normal right ventricular systolic function.  The estimated ejection fraction is 35%.  Grade I left ventricular diastolic dysfunction.  Mild aortic regurgitation.  Mild mitral regurgitation.  Mild tricuspid regurgitation.  There is no pulmonary hypertension.    Pharmacologic Nuclear Cardiac Stress Test - SPECT - 11/22/2023:    Normal myocardial perfusion scan. There is no evidence of myocardial ischemia or infarction.    The LVEF is not accurate due to poor software boundary tracking at rest  and poor software boundary tracking during stress. The visually estimated ejection fraction is normal at rest and normal during stress.    There is mild global hypokinesis at rest and stress.    LV cavity size is normal at rest and normal at stress.    The ECG  portion of the study is negative for ischemia.    The patient reported no chest pain during the stress test.    There were no arrhythmias during stress.    There are no prior studies for comparison.    Resting 2D Transthoracic Echocardiogram - 1/6/2024:    Left Ventricle: The left ventricle is normal in size. Normal wall thickness. Normal wall motion. There is normal systolic function with a visually estimated ejection fraction of 60 - 65%. There is normal diastolic function.    Right Ventricle: Normal right ventricular cavity size. Wall thickness is normal. Right ventricle wall motion  is normal. Systolic function is normal.    Aortic Valve: The aortic valve is a trileaflet valve. There is moderate aortic valve sclerosis. There is annular calcification present. Mildly restricted motion. There is mild aortic regurgitation.    Mitral Valve: There is mild regurgitation.    Pulmonary Artery: The estimated pulmonary artery systolic pressure is 32 mmHg.    IVC/SVC: Normal venous pressure at 3 mmHg.    Pericardium: There is a small posterior effusion. Ascites present.    Resting 2D Transthoracic Echocardiogram - 1/12/2024:    Left Ventricle: The left ventricle is normal in size. Normal wall thickness. Normal wall motion. There is normal systolic function with a visually estimated ejection fraction of 60 - 65%. There is normal diastolic function.    Right Ventricle: Normal right ventricular cavity size. Wall thickness is normal. Right ventricle wall motion  is normal. Systolic function is normal.    Aortic Valve: The aortic valve is a trileaflet valve. There is moderate aortic valve sclerosis. There is mild annular calcification present. Mildly restricted motion. There is mild aortic regurgitation.    Mitral Valve: There is mild to moderate regurgitation.    Tricuspid Valve: The tricuspid valve is structurally normal. There is trace regurgitation.  No evidence of carcinoid disease of the tricuspid valve.    Pulmonary  Artery: There is borderline elevated pulmonary hypertension. The estimated pulmonary artery systolic pressure is 34 mmHg.    IVC/SVC: Intermediate venous pressure at 8 mmHg.    Large left pleural effusion.    Pericardium: There is a moderate circumferential effusion, largest anterior to the tricuspid valve annulus. Pericardial effusion has focal strands. No indication of cardiac tamponade. Evidence includes no IVC dilation, no chamber collapse.    Resting 2D Transthoracic Echocardiogram - 2/12/2024:    Left Ventricle: The left ventricle is normal in size. Normal wall thickness. Regional wall motion abnormalities present. See diagram for wall motion findings. There is severely reduced systolic function with a visually estimated ejection fraction of 15 - 20%. Ejection fraction by visual approximation is 15%. Unable to assess diastolic function due to atrial fibrillation.    Right Ventricle: Normal right ventricular cavity size. Wall thickness is normal. Right ventricle wall motion  is normal. Systolic function is normal.    Left Atrium: Left atrium is severely dilated.    Right Atrium: Right atrium is mildly dilated.    Aortic Valve: There is moderate aortic valve sclerosis. Mildly restricted motion.    Mitral Valve: There is moderate to severe regurgitation.    Tricuspid Valve: There is mild regurgitation.    IVC/SVC: Intermediate venous pressure at 8 mmHg.    Pericardium: There is a trivial effusion. Left pleural effusion.    Resting 2D Transthoracic Echocardiogram - 2/14/2024:    Left Ventricle: The left ventricle is normal in size. Normal wall thickness. Normal wall motion. There is severely reduced systolic function with a visually estimated ejection fraction of 20 - 25%. There is diastolic dysfunction.    Right Ventricle: Normal right ventricular cavity size. Wall thickness is normal. Right ventricle wall motion has segmental dysfunction with akinesis of the mid to distal free wall. Systolic function is  moderately reduced.    Left Atrium: Left atrium is moderately dilated.    Aortic Valve: Aortic valve thickening. There is moderate aortic valve sclerosis. There is annular calcification present.    IVC/SVC: Normal venous pressure at 3 mmHg.    Pericardium: Left pleural effusion.    Coronary Angiogram - 2/21/2024:    Non-obstructive CAD    Non-ischemic cardiomyopathy    The estimated blood loss was <50 mL.    Resting 2D Transthoracic Echocardiogram - 5/15/2024:    Left Ventricle: The left ventricle is normal in size. Ventricular mass is normal. Severely increased wall thickness. There is concentric remodeling. Normal wall motion. There is normal systolic function with a visually estimated ejection fraction of 55 - 60%. Ejection fraction by visual approximation is 58%. There is indeterminate diastolic function.    Right Ventricle: Normal right ventricular cavity size. Wall thickness is normal. Systolic function is normal.    Left Atrium: Left atrium is severely dilated.    Right Atrium: Right atrium is moderately dilated.    Aortic Valve: There is moderate aortic valve sclerosis. At most slightly restricted motion. Aortic valve peak velocity is 1.68 m/s. Mean gradient is 6 mmHg. The IVANNA is low normal to slightly reduced at 2.1. There is mild aortic regurgitation.    Mitral Valve: There is mild-moderate to moderate regurgitation.    Tricuspid Valve: There is mild regurgitation.    Pulmonary Artery: The estimated pulmonary artery systolic pressure is 36 mmHg.    IVC/SVC: Normal venous pressure at 3 mmHg.    Resting 2D Transthoracic Echocardiogram - 2/21/2025:    Left Ventricle: The left ventricle is normal in size. Normal wall thickness. Normal wall motion. There is normal systolic function with a visually estimated ejection fraction of 55 - 60%. Diastolic function cannot be reliably determined in the presence of mitral valve disease.    Right Ventricle: Normal right ventricular cavity size. Wall thickness is normal.  Systolic function is normal.    The left atrium is severely dilated.    Aortic Valve: The aortic valve is a trileaflet valve. There is moderate aortic valve sclerosis. Mildly restricted motion. There is mild aortic regurgitation.    Mitral Valve: There is moderate regurgitation with a centrally directed jet.    Tricuspid Valve: There is mild regurgitation.    Aorta: Aortic root is normal in size measuring 3.11 cm. Ascending aorta is at least upper normal in size measuring 3.7 cm, but the ascending aorta is not ideally visualized.    Pulmonary Artery: The estimated pulmonary artery systolic pressure is 28 mmHg.    IVC/SVC: Normal venous pressure at 3 mmHg.    Personal interpretation of today's Device Interrogation: Personal review of her device interrogation report (Medtronic Micra AV leadless pacemaker, implanted 1/16/2024) reveals adequate battery longevity with an estimated >8 years of battery life remaining. She is presently in underlying sinus bradycardia with rates of 44 bpm and is AS-VS. Her electrode was interrogated with acceptable and stable lead parameters. She is %,  0%. There are no concerning VHR episodes noted. She was reprogrammed today with alteration of her programming from VVI to VDD 55-120bpm with activity mode switched off.       ASSESSMENT & PLAN:   Ms. Lily Green is a disha 74-year-old woman who returns to clinic today for ongoing evaluation and routine device surveillance of a Medtronic Micra AV leadless pacemaker, implanted on 1/16/2024 in the setting of tachycardia-bradycardia syndrome with persistent atrial fibrillation and periods of symptomatic bradycardia with prolonged pauses. She has a past medical history significant for tachycardia-bradycardia syndrome with persistent atrial fibrillation and periods of symptomatic bradycardia, a history of nonischemic cardiomyopathy with her most recent LVEF 55-60%, moderate mitral valve regurgitation, hypertension,  hyperlipidemia, type II diabetes mellitus, celiac disease, a right middle lobe carcinoid tumor s/p robotic-assisted converted to right thoracotomy with therapeutic wedge resection and mediastinal lymph node dissection and repair of bleeding right inferior pulmonary vein on 1/2/2024, end-stage renal disease maintained on hemodialysis via a left AV fistula, peptic ulcer disease, a history of peritonitis due to colonic ischemia s/p a total colectomy and end ileostomy creation on 1/14/2023, evacuation of an intraabdominal hematoma and takedown of left lateral ileostomy and recreation of right lateral end ileostomy on 1/16/2023, elective robotic ileostomy reversal and ileocolonic anastomosis on 8/7/2023, GERD, anxiety, depression, hypothyroidism, and low BMI.     - She has a normally functioning Medtronic Micra AV leadless pacemaker on device interrogation today, with underlying sinus bradycardia. She was previously reprogrammed to avoid frequent RV pacing, as she had previously been RV paced 41%, with reprogramming changes from VVI to VDD 50-120bpm with activity mode switched on in an effort to improve her histograms. Unfortunately, she voices complaints of fatigue while getting hemodialysis, with periods of sinus bradycardia. We have reprogrammed her device today to VDD 55-120bpm with activity mode switched off with an increased lower rate limit.   - On device interrogation today, she has adequate battery longevity with an estimated >8 years of battery life remaining. She is presently in underlying sinus bradycardia with rates of 44 bpm and is AS-VS. Her electrode was interrogated with acceptable and stable lead parameters. She is %,  0%. There are no concerning VHR episodes noted.   - Her intrinsic QRSd is narrow 104ms. She continues to have preserved systolic function, LVEF 55-60%, with continued efforts to prevent indwelling pacemaker leads in the setting of her ongoing need for hemodialysis and limited  vascular access. There is no present indication for device upgrade.    - She will continue to send remote transmissions, and will return for her next in-office interrogation in six months for ongoing surveillance.   - We discussed her paroxysmal atrial fibrillation. She has not had any evidence of recurrent atrial fibrillation on serial ECGs since 2/12/2024, with no events recorded on her pacemaker interrogation today. She was previously maintained on apixaban for oral anticoagulation; however, this was discontinued at a prior hospitalization in the setting of a GI bleed and need for frequent procedures with IR and hemodialysis access. Her [id number] is elevated at 6, portending an annual adjusted risk of CVA of 9.8%. We discussed that should she evidence recurrent atrial fibrillation, we will need to discuss resumption of short-term apixaban 2.5mg po BID with consideration for implantation of a WATCHMAN left atrial appendage occlusion device given her history of GI bleeding.     - She remains on amiodarone 200mg po daily and carvedilol 6.25mg po BID with no evidence of recurrent atrial fibrillation.   - She will continue to follow with her PCP, hematology/oncology team, and general cardiologist for ongoing management of her comorbid medical conditions. Her blood pressure remains at goal on her current medication regimen.       This patient will return to clinic with me in three months with continued remote transmissions in the interim and consideration for possible further adjustment of her lower rate limit. All questions and concerns were addressed at this encounter. Total time spent in this patient encounter: 38 minutes.     Signing Physician:       MELQUIADES Frances MD  Electrophysiology Attending

## 2025-05-13 LAB — OHS CV DC REMOTE DEVICE TYPE: NORMAL

## 2025-05-21 ENCOUNTER — TELEPHONE (OUTPATIENT)
Dept: PRIMARY CARE CLINIC | Facility: CLINIC | Age: 75
End: 2025-05-21
Payer: MEDICARE

## 2025-05-21 DIAGNOSIS — E78.2 MIXED HYPERLIPIDEMIA: ICD-10-CM

## 2025-05-21 DIAGNOSIS — E11.42 TYPE 2 DIABETES MELLITUS WITH DIABETIC POLYNEUROPATHY, WITHOUT LONG-TERM CURRENT USE OF INSULIN: Primary | Chronic | ICD-10-CM

## 2025-05-21 DIAGNOSIS — K21.9 GERD WITHOUT ESOPHAGITIS: ICD-10-CM

## 2025-05-28 ENCOUNTER — OFFICE VISIT (OUTPATIENT)
Dept: PRIMARY CARE CLINIC | Facility: CLINIC | Age: 75
End: 2025-05-28
Payer: MEDICARE

## 2025-05-28 VITALS
HEART RATE: 63 BPM | OXYGEN SATURATION: 96 % | HEIGHT: 65 IN | BODY MASS INDEX: 20.87 KG/M2 | WEIGHT: 125.25 LBS | DIASTOLIC BLOOD PRESSURE: 64 MMHG | SYSTOLIC BLOOD PRESSURE: 110 MMHG

## 2025-05-28 DIAGNOSIS — F33.0 MILD RECURRENT MAJOR DEPRESSION: ICD-10-CM

## 2025-05-28 DIAGNOSIS — K90.0 CELIAC DISEASE: ICD-10-CM

## 2025-05-28 DIAGNOSIS — E78.2 MIXED HYPERLIPIDEMIA: ICD-10-CM

## 2025-05-28 DIAGNOSIS — G89.29 CHRONIC NECK PAIN: ICD-10-CM

## 2025-05-28 DIAGNOSIS — E11.42 TYPE 2 DIABETES MELLITUS WITH DIABETIC POLYNEUROPATHY, WITHOUT LONG-TERM CURRENT USE OF INSULIN: Primary | Chronic | ICD-10-CM

## 2025-05-28 DIAGNOSIS — R29.898 MUSCULAR DECONDITIONING: ICD-10-CM

## 2025-05-28 DIAGNOSIS — N18.6 TYPE 2 DIABETES MELLITUS WITH CHRONIC KIDNEY DISEASE ON CHRONIC DIALYSIS, WITH LONG-TERM CURRENT USE OF INSULIN: ICD-10-CM

## 2025-05-28 DIAGNOSIS — Z79.4 TYPE 2 DIABETES MELLITUS WITH CHRONIC KIDNEY DISEASE ON CHRONIC DIALYSIS, WITH LONG-TERM CURRENT USE OF INSULIN: ICD-10-CM

## 2025-05-28 DIAGNOSIS — Z99.2 TYPE 2 DIABETES MELLITUS WITH CHRONIC KIDNEY DISEASE ON CHRONIC DIALYSIS, WITH LONG-TERM CURRENT USE OF INSULIN: ICD-10-CM

## 2025-05-28 DIAGNOSIS — E11.22 TYPE 2 DIABETES MELLITUS WITH CHRONIC KIDNEY DISEASE ON CHRONIC DIALYSIS, WITH LONG-TERM CURRENT USE OF INSULIN: ICD-10-CM

## 2025-05-28 DIAGNOSIS — J44.9 CHRONIC OBSTRUCTIVE PULMONARY DISEASE, UNSPECIFIED COPD TYPE: ICD-10-CM

## 2025-05-28 DIAGNOSIS — R26.81 GAIT INSTABILITY: ICD-10-CM

## 2025-05-28 DIAGNOSIS — H81.03 MENIERE'S DISEASE OF BOTH EARS: ICD-10-CM

## 2025-05-28 DIAGNOSIS — F51.01 PRIMARY INSOMNIA: ICD-10-CM

## 2025-05-28 DIAGNOSIS — M54.2 CHRONIC NECK PAIN: ICD-10-CM

## 2025-05-28 DIAGNOSIS — K21.9 GERD WITHOUT ESOPHAGITIS: ICD-10-CM

## 2025-05-28 DIAGNOSIS — I50.20 HFREF (HEART FAILURE WITH REDUCED EJECTION FRACTION): ICD-10-CM

## 2025-05-28 DIAGNOSIS — I48.11 LONGSTANDING PERSISTENT ATRIAL FIBRILLATION: ICD-10-CM

## 2025-05-28 PROBLEM — K51.20 ULCERATIVE PROCTITIS WITHOUT COMPLICATION: Status: RESOLVED | Noted: 2024-04-26 | Resolved: 2025-05-28

## 2025-05-28 PROCEDURE — 99999 PR PBB SHADOW E&M-EST. PATIENT-LVL V: CPT | Mod: PBBFAC,,, | Performed by: INTERNAL MEDICINE

## 2025-05-28 NOTE — PROGRESS NOTES
"[facility] Note    Chief Complaint      Chief Complaint   Patient presents with    Follow-up     6 month        History of Present Illness      History of Present Illness    CHIEF COMPLAINT:  Ms. Green presents today for follow up.    HYPOTENSION:  She reports low blood pressure readings. Her blood pressure medication regimen was adjusted to every 8 hours following hospitalization in early May, with removal during dialysis at 1.5 hours into treatment. She does not take blood pressure medication when readings are 110.    INTEGUMENTARY:  She reports fragile skin that tears and bruises easily, describing it as "tissue paper-like". She currently has two skin tears from an incident where her dog slipped on the truck console. The tears had minimal bleeding. She is managing with topical cream and bandages.    DIABETES:  She uses Novolog insulin only when blood sugar exceeds 200 and is no longer on long-acting insulin.    MOBILITY:  She reports feeling wobbly. She has not used a wheelchair for over 6 months and is not participating in physical therapy.      ROS:  Skin: +easy bruising  Neurological: +abnormal gait       DM2 with neuropathy: Sees endo NP Sherine Rocha.  Previously controlled on metformin prior to ARF, transitioned to insulin.  Novolog SSI.  A1C 5.6. Eye exam UTD with Dr. Rivera 2024.  Foot exam UTD with Dr. Bryan previously.  HLD: Controlled on lipitor.  LDL 64, stable from last check.    ESRD: Sees Dr. Vyas, nephrology.  HD T Th Sat. On midodrine for BP support during HD.  A fib: Sees EP cardiology, Dr. Woody.  On Amiodarone, carvedilol for rate control.  On aspirin for anticoagulation. S/P pacemaker.  Recently had a rate adjustment.  HFrEF: Sees cardiology, Dr. Umberto Thibodeaux.  On carvedilol, valsartan, torsemide.  GERD: Controlled on pantoprazole BID.  No new or worsening symptoms.  Depression: Controlled on effexor.  No new or worsening symptoms  Insomnia: Previously on " temazepam.  Chronic neck pain 2/2 cervical degenerative disc disease: . No new exacerbations.  Off gabapentin 2/2 no perceived benefit.  Using combination of vitamin supplement and topical neuropathy treatment for feet.  Had lumbar sympathetic nerve block which was effective for a few months.   Celiac disease, IBS: previously saw Dr. Amor.  Avoiding triggers to avoid exacerbations.  Doing well with diet control. Saw Dr. Hutton who started on dicyclomine which has helped.  Taking miralax.    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  TdAP 2015.  Prevnar UTD, pneumovax UTD.  Shingrix discussed.  COVID vaccine UTD, due for booster.  Mammogram deferred due to recent health developements.  DEXA 2015, normal.  Cscope 2018, Dr. Amor, no polyps, 5 yr interval.     Assessment/Plan     Lily Green is a 74 y.o.female with:    Assessment & Plan    BP management: Recent low readings despite medication changes by nephrologist. Explained importance of maintaining BP above 120 systolic to ensure adequate perfusion to kidneys and other organs. Continued BP medication, instructing to take even when BP is 110-119.  Skin fragility: Easy tearing and bruising.  Recent pacemaker adjustment from 40 to 50 by Dr. Nelson.  Dissatisfaction with current gastroenterologist: Discussed potential alternatives.  Diabetes management: A1C of 5.6 indicates good control.  Unsteadiness: Determined need for physical therapy to improve core and leg strength.    ## END STAGE RENAL DISEASE AND DIALYSIS:  - Monitored the patient who undergoes dialysis and takes medications every 8 hours, with medication administration occurring 1.5 hours into the dialysis process.  - Ms. Green is under the care of nurse practitioner Dennis.    ## HYPOTENSION:  - Evaluated the patient's blood pressure readings which are consistently low, sometimes reaching 110, which is considered not very  high.  - Assessed the importance of maintaining adequate blood pressure to ensure proper blood flow to kidneys and other organs.  - Noted that patient does not take medication when readings are low.  - Recommend to continue taking blood pressure medication even when readings are 110 or 119, and to skip a dose only if over 120.  - Discussed BP measurement techniques, emphasizing that brachial pressure is most accurate, followed by radial and ankle pressures.    ## TYPE 2 DIABETES MELLITUS:  - Evaluated hemoglobin A1C of 5.6, indicating good glycemic control.  - Educated the patient on A1C as a 3-month average of glucose control.  - Assessed the importance of not being overly aggressive in lowering glucose to avoid hypoglycemic episodes, explaining that risks of hypoglycemia outweigh those of occasional hyperglycemia.  - Continued as-needed Novolog insulin for glucose over 200.  - Monitored the patient who regularly checks glucose levels.    ## SKIN DISORDERS:  - Monitored the patient who experiences skin tears and bruises easily due to tissue paper-like skin.  - Advised the patient to wear long sleeves to protect fragile skin.  - Recommend topical cream and bandages for skin tears and instructed to continue this treatment regimen.    ## UNSTEADINESS ON FEET:  - Monitored the patient who feels unsteady and needs to improve core and leg strength.  - Advised the patient to resume physical therapy exercises for better stability.  - Referred the patient to Physical Therapy at the hospital in [place] specifically for core and lower extremity strengthening.    ## FOLLOW-UP:  - Recommend tetanus vaccine to be administered at the pharmacy (due to Medicare Part D coverage).  - Follow up to arrange physical therapy appointment at the hospital.         1. Type 2 diabetes mellitus with diabetic polyneuropathy, without long-term current use of insulin    2. Type 2 diabetes mellitus with chronic kidney disease on chronic  dialysis, with long-term current use of insulin    3. Mixed hyperlipidemia    4. Longstanding persistent atrial fibrillation    5. HFrEF (heart failure with reduced ejection fraction)    6. GERD without esophagitis    7. Mild recurrent major depression    8. Primary insomnia    9. Chronic neck pain    10. Celiac disease    11. Meniere's disease of both ears    12. Chronic obstructive pulmonary disease, unspecified COPD type    13. Gait instability  - Ambulatory Referral/Consult to Physical Therapy; Future    14. Muscular deconditioning  - Ambulatory Referral/Consult to Physical Therapy; Future      Chronic conditions status updated as per HPI.  Other than changes above, cont current medications and maintain follow up with specialists.  No follow-ups on file.    Future Appointments   Date Time Provider Department Center   2025 11:00 AM Kings Aleman, PT [facility] OP [facility]   2025  1:30 PM Deion Cheng MD [facility] [address]   2025 10:40 AM Lucas Bryan DPM [facility] POD [place]   2025  9:30 AM Pavel Calixto MD [facility] [place]           Past Medical History:  Past Medical History:   Diagnosis Date    Anxiety     Celiac disease 2018    Celiac disease     Depression     Diabetes mellitus     Family history of breast cancer in mother      at age 68    Hyperlipidemia     Hypertension     Hypertensive retinopathy of both eyes 2024    Meniere disease     Meniere's disease, unspecified ear     Menopause     Moderate nonproliferative diabetic retinopathy of both eyes 2024    On supplemental oxygen by nasal cannula 2023    Hypoxic respiratory failure resolved.  No oxygen desaturation on walk.        Peptic ulcer     Peritonitis 2023    Pleural effusion on left 01/15/2024    C/w transudate in setting of acute decompensated heart failure due to a-fib and volume overload.  Now appears euvolemic in clinic today, resolved.       Reflux esophagitis      Urinary tract infection     Vaginal infection     Vaginal Pap smear 04/07/2014    Pap/Hpv Negative        Past Surgical History:   has a past surgical history that includes Tonsillectomy; Appendectomy; Dilation and curettage of uterus (1986); Hysterectomy (1987); Carpal tunnel release (Bilateral, 09/2017); Shoulder surgery (2009 & 2010); Colonoscopy (04/2018); Upper gastrointestinal endoscopy (04/2018); Dupuytren contracture release (Bilateral, 09/2017); Breast surgery; Injection of neurolytic agent around lumbar sympathetic nerve (N/A, 01/06/2022); Injection of neurolytic agent around lumbar sympathetic nerve (N/A, 08/25/2022); laparotomy, exploratory (N/A, 01/14/2023); laparotomy, exploratory (N/A, 01/16/2023); closure, colostomy (Left, 01/16/2023); Colostomy (Right, 01/16/2023); Insertion of tunneled central venous hemodialysis catheter (Right, 01/25/2023); removal, tunneled cath (Right, 01/25/2023); insertion, catheter, tunneled (Left, 01/28/2023); Removal of catheter (Right, 01/28/2023); robotic bronchoscopy (N/A, 10/20/2023); pacemaker, temporary, transvenous, pediatric (Right, 01/12/2024); Implantation of leadless pacemaker (N/A, 01/16/2024); Esophagogastroduodenoscopy (N/A, 01/24/2024); Esophagogastroduodenoscopy (N/A, 01/26/2024); lymphadenectomy (Right, 01/02/2024); thoracotomy (Right, 01/02/2024); thoracotomy, with initial therapeutic wedge resection of lung (Right, 01/02/2024); Coronary angiography (N/A, 02/21/2024); and Left heart catheterization (Left, 02/21/2024).    Family History:  family history includes Arthritis in her father; Breast cancer in her mother and paternal aunt; Cancer in her mother and paternal aunt; Diabetes in her paternal grandfather; Hyperlipidemia in her sister; Vision loss in her father, mother, and sister.     Social History:  Social History[1]    Medications:  Encounter Medications[2]    Allergies:  Review of patient's allergies indicates:   Allergen Reactions    Crestor  "[rosuvastatin] Swelling    Gluten protein        Health Maintenance:  Immunization History   Administered Date(s) Administered    COVID-19, MRNA, LN-S, PF (Pfizer) (Purple Cap) 02/13/2021, 03/06/2021    Influenza 11/30/2012    Influenza (FLUAD) - Quadrivalent - Adjuvanted - PF *Preferred* (65+) 10/03/2020    Influenza Split 10/15/2019    Pneumococcal Conjugate - 13 Valent 04/27/2018    Pneumococcal Polysaccharide - 23 Valent 10/14/2019      Health Maintenance   Topic Date Due    TETANUS VACCINE  Never done    Shingles Vaccine (1 of 2) Never done    RSV Vaccine (Age 60+ and Pregnant patients) (1 - Risk 60-74 years 1-dose series) Never done    COVID-19 Vaccine (3 - Pfizer risk series) 04/03/2021    Mammogram  12/28/2023    Foot Exam  06/28/2025    Influenza Vaccine (Season Ended) 09/01/2025    Hemoglobin A1c  11/23/2025    Diabetic Eye Exam  12/18/2025    DEXA Scan  01/03/2026    Lipid Panel  05/23/2026    Hepatitis C Screening  Completed    Pneumococcal Vaccines (Age 50+)  Completed        Physical Exam      Vital Signs  Pulse: 63  SpO2: 96 %  BP: 110/64  BP Location: Right arm  Patient Position: Sitting  Pain Score: 0-No pain  Height and Weight  Height: 5' 5" (165.1 cm)  Weight: 56.8 kg (125 lb 3.5 oz)  BSA (Calculated - sq m): 1.61 sq meters  BMI (Calculated): 20.8  Weight in (lb) to have BMI = 25: 149.9]    Physical Exam    General: No acute distress. Well-developed. Well-nourished.  Eyes: EOMI. Sclerae anicteric.  HENT: Normocephalic. Atraumatic. Nares patent. Moist oral mucosa.  Ears: Bilateral TMs clear. Bilateral EACs clear.  Cardiovascular: Regular rate. Regular rhythm. No murmurs. No rubs. No gallops. Normal S1, S2.  Respiratory: Normal respiratory effort. Clear to auscultation bilaterally. No rales. No rhonchi. No wheezing.  Abdomen: Soft. Non-tender. Non-distended. Normoactive bowel sounds.  Musculoskeletal: No  obvious deformity.  Extremities: No lower extremity edema.  Neurological: Alert & oriented x3. " No slurred speech. Normal gait.  Psychiatric: Normal mood. Normal affect. Good insight. Good judgment.  Skin: Warm. Dry. No rash.         Physical Exam  Vitals reviewed.   Constitutional:       Appearance: She is well-developed.   HENT:      Head: Normocephalic and atraumatic.      Right Ear: External ear normal.      Left Ear: External ear normal.   Cardiovascular:      Rate and Rhythm: Normal rate and regular rhythm.      Heart sounds: Normal heart sounds. No murmur heard.  Pulmonary:      Effort: Pulmonary effort is normal.      Breath sounds: Normal breath sounds. No wheezing or rales.   Abdominal:      General: Bowel sounds are normal. There is no distension.      Palpations: Abdomen is soft.      Tenderness: There is no abdominal tenderness.         Laboratory:    Results              CBC:  Recent Labs   Lab 03/01/25 0325 05/08/25 1527 05/08/25 1527 05/23/25  0902   WBC 11.69 8.05   < > 8.62   RBC 2.89 L 4.36   < > 4.25   Hemoglobin 9.1 L  --   --   --    HGB  --  14.1   < > 13.5   Hematocrit 30.7 L  --   --   --    HCT  --  44.6   < > 44.3   Platelet Count  --  188   < > 213   Platelets 194  --   --   --     H 102 H   < > 104 H   MCH 31.5 H 32.3 H   < > 31.8 H   MCHC 29.6 L 31.6 L  --  30.5 L    < > = values in this interval not displayed.     CMP:  Recent Labs   Lab 03/01/25 0325 05/08/25 1527 05/08/25 1527 05/23/25  0902   Glucose 145 H 124 H   < > 128 H   Calcium 9.5 9.6   < > 10.6 H   Albumin 3.1 L 3.7   < > 3.6   Protein Total  --  9.5 H   < > 8.9 H   Total Protein 7.5  --   --   --    Sodium 135 L 135 L   < > 136   Potassium 4.5 4.3   < > 5.0   CO2 21 L 29   < > 28   Chloride 101 94 L   < > 95   BUN 47 H 19   < > 41 H   Alkaline Phosphatase 149  --   --   --    ALP  --  115   < > 123   ALT 27 77 H   < > 34   AST 35 72 H   < > 32   Total Bilirubin 0.3  --   --   --    Bilirubin Total  --  0.5  --  0.5    < > = values in this interval not displayed.     URINALYSIS:  Recent Labs   Lab  08/03/23  1055 09/27/23  0948 05/28/24  1717   Color, UA Yellow Yellow  --    Specific [place], UA 1.015 1.010  --    pH, UA 6.0 >8.0 A 6.0   Protein, UA 3+ A 1+ A  --    Bacteria Many A None  --    Nitrite, UA Negative Negative  --    Leukocytes, UA 3+ A Negative  --    Urobilinogen, UA Negative  --   --    Hyaline Casts, UA 0 0  --       LIPIDS:  Recent Labs   Lab 01/13/23  2348 01/14/23  1247 11/15/23  0802 02/12/24  0556 02/16/24  1553 03/26/24  1230 05/23/25  0902   TSH 1.821  --   --  18.183 H  --  19.949 H  --    HDL  --   --  45  --  39 L  --   --    HDL Cholesterol  --   --   --   --   --   --  46   Cholesterol Total  --   --   --   --   --   --  127   Cholesterol  --   --  150  --  125  --   --    Triglycerides  --    < > 201 H  --  128  --   --    Triglyceride  --   --   --   --   --   --  115   LDL Cholesterol  --   --  64.8  --  60.4 L  --  58.0 L   HDL/Cholesterol Ratio  --   --  30.0  --  31.2  --  36.2   Non-HDL Cholesterol  --   --  105  --  86  --   --    Non HDL Cholesterol  --   --   --   --   --   --  81   Total Cholesterol/HDL Ratio  --   --  3.3  --  3.2  --   --    Cholesterol/HDL Ratio  --   --   --   --   --   --  2.8    < > = values in this interval not displayed.     TSH:  Recent Labs   Lab 01/13/23  2348 02/12/24  0556 03/26/24  1230   TSH 1.821 18.183 H 19.949 H     A1C:  Recent Labs   Lab 11/01/22  0901 07/11/23  0707 11/15/23  0802 01/28/25  2010 05/23/25  0902   Hemoglobin A1C 5.8 H 5.3 4.9 5.3  --    Hemoglobin A1c  --   --   --   --  5.6           This note was generated with the assistance of ambient listening technology. Verbal consent was obtained by the patient and accompanying visitor(s) for the recording of patient appointment to facilitate this note. I attest to having reviewed and edited the generated note for accuracy, though some syntax or spelling errors may persist. Please contact the author of this note for any clarification.      Pavel Calixto MD  [facility]                       [1]   Social History  Tobacco Use    Smoking status: Never    Smokeless tobacco: Never   Substance Use Topics    Alcohol use: Not Currently     Comment: Use to drink. Haven't had a drink in the past 10 years.    Drug use: Never   [2]   Outpatient Encounter Medications as of 5/28/2025   Medication Sig Dispense Refill    amiodarone (PACERONE) 200 MG Tab Take 1 tablet (200 mg total) by mouth 2 (two) times daily. 180 tablet 3    aspirin (ECOTRIN) 81 MG EC tablet Take 1 tablet by mouth every morning.      atorvastatin (LIPITOR) 40 MG tablet Take 1 tablet (40 mg total) by mouth every evening. 90 tablet 3    B-complex with vitamin C (Z-BEC OR EQUIV) tablet Take 1 tablet by mouth every morning.      beta carotene 91714 UNIT capsule Take 1 capsule by mouth every morning.      blood sugar diagnostic (TRUE METRIX GLUCOSE TEST STRIP) Strp USE ONE STRIP FOR TESTING 2 TIMES A  each 3    blood-glucose meter kit Use to test twice a day 1 each 0    calcium carbonate-vitamin D3 500 mg-10 mcg (400 unit) Tab Take 1 tablet by mouth once daily.      carvediloL (COREG) 6.25 MG tablet TAKE 1 TABLET(6.25 MG) BY MOUTH TWICE DAILY WITH MEALS 60 tablet 1    HYDROcodone-acetaminophen (NORCO) 7.5-325 mg per tablet Take 1 tablet by mouth 4 (four) times daily as needed.      insulin aspart U-100 (NOVOLOG) 100 unit/mL (3 mL) InPn pen Inject before meals with scale:  201-250=+2, 251-300=+3; 301-350=+4, over 350=+5 units. Max daily dose 10 units 15 mL 0    lancets (TRUEPLUS LANCETS) 30 gauge Misc USE ONE LANCET FOR TESTING FOUR TIMES DAILY 200 each 5    lancets Misc 1 lancet by Misc.(Non-Drug; Combo Route) route 4 (four) times daily. 400 each 3    levothyroxine (SYNTHROID) 112 MCG tablet Take 1 tablet (112 mcg total) by mouth before breakfast. 90 tablet 3    loperamide (IMODIUM) 2 mg capsule Take 2 mg by mouth once as needed for Diarrhea.      midodrine (PROAMATINE) 10 MG tablet Take 1 tablet (10 mg total) by mouth  "every 8 (eight) hours. Can also take an extra dose with dialysis 120 tablet 3    pantoprazole (PROTONIX) 40 MG tablet Take 1 tablet (40 mg total) by mouth once daily. 30 tablet 2    pen needle, diabetic (BD ULTRA-FINE MARIS PEN NEEDLE) 32 gauge x 5/32" Ndle 1 Device by Misc.(Non-Drug; Combo Route) route 2 (two) times a day. 200 each 3    pregabalin (LYRICA) 75 MG capsule TAKE ONE CAPSULE BY MOUTH EVERY OTHER DAY 45 capsule 3    MELLISSA-BENJY RX 1- mg-mg-mcg Tab Take 1 tablet by mouth once daily. 90 tablet 4    sevelamer carbonate (RENVELA) 800 mg Tab Take 1 tablet (800 mg total) by mouth 3 (three) times daily with meals. . 270 tablet 4    torsemide (DEMADEX) 20 MG Tab Take 1 tablet (20 mg total) by mouth 2 (two) times daily. 180 tablet 3    TRADJENTA 5 mg Tab tablet TAKE 1 TABLET(5 MG) BY MOUTH DAILY 90 tablet 3    valsartan (DIOVAN) 40 MG tablet Take 0.5 tablets (20 mg total) by mouth 2 (two) times daily. 30 tablet 11    venlafaxine (EFFEXOR-XR) 37.5 MG 24 hr capsule Take 1 capsule (37.5 mg total) by mouth once daily. 90 capsule 3    [DISCONTINUED] linaGLIPtin (TRADJENTA) 5 mg Tab tablet Take 1 tablet (5 mg total) by mouth once daily. 90 tablet 1     No facility-administered encounter medications on file as of 5/28/2025.     "

## 2025-06-02 PROBLEM — R29.898 ANKLE WEAKNESS: Status: RESOLVED | Noted: 2021-07-13 | Resolved: 2025-06-02

## 2025-06-02 PROBLEM — R53.1 WEAKNESS: Status: RESOLVED | Noted: 2023-09-29 | Resolved: 2025-06-02

## 2025-06-02 PROBLEM — Z74.09 IMPAIRED FUNCTIONAL MOBILITY AND ACTIVITY TOLERANCE: Status: RESOLVED | Noted: 2024-05-30 | Resolved: 2025-06-02

## 2025-06-03 PROBLEM — R29.898 DECREASED STRENGTH OF LOWER EXTREMITY: Status: ACTIVE | Noted: 2025-06-03

## 2025-06-03 PROBLEM — Z74.09 IMPAIRED FUNCTIONAL MOBILITY, BALANCE, GAIT, AND ENDURANCE: Status: ACTIVE | Noted: 2025-06-03

## 2025-06-04 ENCOUNTER — OFFICE VISIT (OUTPATIENT)
Dept: TRANSPLANT | Facility: CLINIC | Age: 75
End: 2025-06-04
Payer: MEDICARE

## 2025-06-04 VITALS
DIASTOLIC BLOOD PRESSURE: 52 MMHG | HEIGHT: 65 IN | BODY MASS INDEX: 20.97 KG/M2 | WEIGHT: 125.88 LBS | OXYGEN SATURATION: 98 % | HEART RATE: 57 BPM | SYSTOLIC BLOOD PRESSURE: 89 MMHG

## 2025-06-04 DIAGNOSIS — I48.0 PAROXYSMAL ATRIAL FIBRILLATION: Primary | Chronic | ICD-10-CM

## 2025-06-04 DIAGNOSIS — I50.42 CHRONIC COMBINED SYSTOLIC AND DIASTOLIC HEART FAILURE: ICD-10-CM

## 2025-06-04 DIAGNOSIS — I42.8 NONISCHEMIC CARDIOMYOPATHY: ICD-10-CM

## 2025-06-04 PROCEDURE — 3078F DIAST BP <80 MM HG: CPT | Mod: CPTII,S$GLB,, | Performed by: INTERNAL MEDICINE

## 2025-06-04 PROCEDURE — 99999 PR PBB SHADOW E&M-EST. PATIENT-LVL V: CPT | Mod: PBBFAC,,, | Performed by: INTERNAL MEDICINE

## 2025-06-04 PROCEDURE — 3074F SYST BP LT 130 MM HG: CPT | Mod: CPTII,S$GLB,, | Performed by: INTERNAL MEDICINE

## 2025-06-04 PROCEDURE — 3008F BODY MASS INDEX DOCD: CPT | Mod: CPTII,S$GLB,, | Performed by: INTERNAL MEDICINE

## 2025-06-04 PROCEDURE — 1126F AMNT PAIN NOTED NONE PRSNT: CPT | Mod: CPTII,S$GLB,, | Performed by: INTERNAL MEDICINE

## 2025-06-04 PROCEDURE — 3288F FALL RISK ASSESSMENT DOCD: CPT | Mod: CPTII,S$GLB,, | Performed by: INTERNAL MEDICINE

## 2025-06-04 PROCEDURE — 3066F NEPHROPATHY DOC TX: CPT | Mod: CPTII,S$GLB,, | Performed by: INTERNAL MEDICINE

## 2025-06-04 PROCEDURE — 4010F ACE/ARB THERAPY RXD/TAKEN: CPT | Mod: CPTII,S$GLB,, | Performed by: INTERNAL MEDICINE

## 2025-06-04 PROCEDURE — 1100F PTFALLS ASSESS-DOCD GE2>/YR: CPT | Mod: CPTII,S$GLB,, | Performed by: INTERNAL MEDICINE

## 2025-06-04 PROCEDURE — 1157F ADVNC CARE PLAN IN RCRD: CPT | Mod: CPTII,S$GLB,, | Performed by: INTERNAL MEDICINE

## 2025-06-04 PROCEDURE — 1159F MED LIST DOCD IN RCRD: CPT | Mod: CPTII,S$GLB,, | Performed by: INTERNAL MEDICINE

## 2025-06-04 PROCEDURE — 3044F HG A1C LEVEL LT 7.0%: CPT | Mod: CPTII,S$GLB,, | Performed by: INTERNAL MEDICINE

## 2025-06-04 PROCEDURE — 99214 OFFICE O/P EST MOD 30 MIN: CPT | Mod: S$GLB,,, | Performed by: INTERNAL MEDICINE

## 2025-06-04 RX ORDER — CARVEDILOL 6.25 MG/1
6.25 TABLET ORAL 2 TIMES DAILY
Qty: 120 TABLET | Refills: 3 | Status: SHIPPED | OUTPATIENT
Start: 2025-06-04

## 2025-06-04 RX ORDER — TORSEMIDE 20 MG/1
20 TABLET ORAL 2 TIMES DAILY
Qty: 180 TABLET | Refills: 3 | Status: SHIPPED | OUTPATIENT
Start: 2025-06-04

## 2025-06-04 RX ORDER — VALSARTAN 40 MG/1
20 TABLET ORAL 2 TIMES DAILY
Qty: 90 TABLET | Refills: 3 | Status: SHIPPED | OUTPATIENT
Start: 2025-06-04 | End: 2026-06-04

## 2025-06-05 ENCOUNTER — OUTSIDE PLACE OF SERVICE (OUTPATIENT)
Dept: NEPHROLOGY | Facility: CLINIC | Age: 75
End: 2025-06-05
Payer: MEDICARE

## 2025-06-16 RX ORDER — CALCIUM CITRATE/VITAMIN D3 200MG-6.25
TABLET ORAL
Qty: 200 STRIP | Refills: 3 | Status: SHIPPED | OUTPATIENT
Start: 2025-06-16

## 2025-06-24 ENCOUNTER — PATIENT MESSAGE (OUTPATIENT)
Dept: TRANSPLANT | Facility: CLINIC | Age: 75
End: 2025-06-24
Payer: MEDICARE

## 2025-06-29 PROBLEM — I48.0 PAROXYSMAL ATRIAL FIBRILLATION: Status: ACTIVE | Noted: 2024-01-05

## 2025-06-29 PROBLEM — I50.32 HEART FAILURE WITH RECOVERED EJECTION FRACTION (HFRECEF): Status: ACTIVE | Noted: 2024-05-25

## 2025-06-29 PROBLEM — M15.9 OSTEOARTHRITIS OF MULTIPLE JOINTS: Status: ACTIVE | Noted: 2025-06-29

## 2025-06-29 RX ORDER — AMIODARONE HYDROCHLORIDE 200 MG/1
200 TABLET ORAL DAILY
Qty: 180 TABLET | Refills: 3 | Status: SHIPPED | OUTPATIENT
Start: 2025-06-29

## 2025-07-01 DIAGNOSIS — K21.9 GASTROESOPHAGEAL REFLUX DISEASE WITHOUT ESOPHAGITIS: ICD-10-CM

## 2025-07-01 DIAGNOSIS — R42 DIZZINESS: ICD-10-CM

## 2025-07-01 RX ORDER — PREGABALIN 75 MG/1
75 CAPSULE ORAL EVERY OTHER DAY
Qty: 45 CAPSULE | Refills: 3 | Status: SHIPPED | OUTPATIENT
Start: 2025-07-01

## 2025-07-01 NOTE — TELEPHONE ENCOUNTER
Care Due:                  Date            Visit Type   Department     Provider  --------------------------------------------------------------------------------                                EP -                              PRIMARY      [facility]  Last Visit: 05-      CARE ([facility])   CARE           Pavel Deleon                              EP -                              PRIMARY      [facility]  Next Visit: 11-      CARE ([facility])   CARE           Pavel Deleon                                                            Last  Test          Frequency    Reason                     Performed    Due Date  --------------------------------------------------------------------------------    TSH.........  12 months..  levothyroxine............  03- 03-    [facility] Embedded Care Due Messages. Reference number: [id number].   7/01/2025 9:26:24 AM CDT

## 2025-07-07 ENCOUNTER — CLINICAL SUPPORT (OUTPATIENT)
Dept: CARDIOLOGY | Facility: HOSPITAL | Age: 75
End: 2025-07-07
Attending: STUDENT IN AN ORGANIZED HEALTH CARE EDUCATION/TRAINING PROGRAM
Payer: MEDICARE

## 2025-07-07 ENCOUNTER — CLINICAL SUPPORT (OUTPATIENT)
Dept: CARDIOLOGY | Facility: HOSPITAL | Age: 75
End: 2025-07-07
Payer: MEDICARE

## 2025-07-07 DIAGNOSIS — Z95.0 PRESENCE OF CARDIAC PACEMAKER: ICD-10-CM

## 2025-07-07 DIAGNOSIS — R00.1 BRADYCARDIA, UNSPECIFIED: ICD-10-CM

## 2025-07-07 PROCEDURE — 93296 REM INTERROG EVL PM/IDS: CPT | Performed by: STUDENT IN AN ORGANIZED HEALTH CARE EDUCATION/TRAINING PROGRAM

## 2025-07-07 PROCEDURE — 93294 REM INTERROG EVL PM/LDLS PM: CPT | Mod: ,,, | Performed by: STUDENT IN AN ORGANIZED HEALTH CARE EDUCATION/TRAINING PROGRAM

## 2025-07-08 ENCOUNTER — OUTSIDE PLACE OF SERVICE (OUTPATIENT)
Dept: NEPHROLOGY | Facility: CLINIC | Age: 75
End: 2025-07-08
Payer: MEDICARE

## 2025-07-18 LAB
OHS CV DC REMOTE DEVICE TYPE: NORMAL
OHS CV RV PACING PERCENT: 98.6 %

## 2025-07-20 ENCOUNTER — OCHSNER VIRTUAL EMERGENCY DEPARTMENT (OUTPATIENT)
Facility: CLINIC | Age: 75
End: 2025-07-20
Payer: MEDICARE

## 2025-07-20 ENCOUNTER — OFFICE VISIT (OUTPATIENT)
Dept: URGENT CARE | Facility: CLINIC | Age: 75
End: 2025-07-20
Payer: MEDICARE

## 2025-07-20 ENCOUNTER — PATIENT OUTREACH (OUTPATIENT)
Facility: OTHER | Age: 75
End: 2025-07-20
Payer: MEDICARE

## 2025-07-20 VITALS
OXYGEN SATURATION: 95 % | BODY MASS INDEX: 20.83 KG/M2 | RESPIRATION RATE: 20 BRPM | DIASTOLIC BLOOD PRESSURE: 51 MMHG | HEIGHT: 65 IN | WEIGHT: 125 LBS | TEMPERATURE: 99 F | HEART RATE: 75 BPM | SYSTOLIC BLOOD PRESSURE: 99 MMHG

## 2025-07-20 DIAGNOSIS — E87.70 HYPERVOLEMIA, UNSPECIFIED HYPERVOLEMIA TYPE: Primary | ICD-10-CM

## 2025-07-20 DIAGNOSIS — R05.1 ACUTE COUGH: ICD-10-CM

## 2025-07-20 DIAGNOSIS — R06.02 SHORTNESS OF BREATH: Primary | ICD-10-CM

## 2025-07-20 PROBLEM — J81.0 ACUTE PULMONARY EDEMA: Status: ACTIVE | Noted: 2025-07-20

## 2025-07-20 PROCEDURE — 71046 X-RAY EXAM CHEST 2 VIEWS: CPT | Mod: S$GLB,,, | Performed by: RADIOLOGY

## 2025-07-20 PROCEDURE — 99215 OFFICE O/P EST HI 40 MIN: CPT | Mod: S$GLB,,, | Performed by: PHYSICIAN ASSISTANT

## 2025-07-20 NOTE — PLAN OF CARE-OVED
[facility] Emergency Department Plan of Care Note  Referral Source: Urgent Care                               Chief Complaint   Patient presents with    Shortness of Breath       Recommendation: Emergency Department            Emergency Department: [facility]    Patient on HD - t/r/s, with weight gain, worsening shortness of breath and hypotension complicating dialysis.                 Encounter Diagnosis   Name Primary?    Shortness of breath Yes

## 2025-07-20 NOTE — PROGRESS NOTES
"Patient contacted on call nurse, reports "cough and SOB for about 3 weeks now.  Very seldom does she get sputum production. Denies LE edema. " Consulted Dr. Ralph, Advised ED, [place] follow up 7/21/25    "

## 2025-07-20 NOTE — PROGRESS NOTES
"Subjective:      Patient ID: Lily Green is a 74 y.o. female.    Vitals:  height is 5' 5" (1.651 m) and weight is 56.7 kg (125 lb). Her oral temperature is 98.5 °F (36.9 °C). Her blood pressure is 99/51 (abnormal) and her pulse is 75. Her respiration is 20 and oxygen saturation is 95%.     Chief Complaint: Cough    Pt complains of cough and SOB that started about 2 weeks ago. When she went to dialysis she told her kidney dr about her cough and he put in orders for a CXR. She has not heard from anyone since and her cough and sob is getting worse. She had her right middle lobe removed due to lung cancer.    Patient provider note starts here:  Patient presents with complaints of cough, chest congestion, SOB for about 3 weeks now. Mentioned it to the provider at dialysis and had a CXR ordered on 7/15 but she never received results of the CXR. Continues to have a rather nonproductive cough and SOB. No LE edema. Last dialysis session was yesterday and had to be stopped early due to low BP despite taking 3 doses of Midodrine before, at the start and during dialysis. Does not urinate but reports that she having an ostomy reversal a few years ago, her stool is usually watery and non formed about 5-6 times daily and noticed that her BM have become more formed and less frequent over the past month or so.     Cough  This is a new problem. Episode onset: 2 weeks ago. The problem has been gradually worsening. The problem occurs constantly. The cough is Non-productive. Associated symptoms include shortness of breath. Pertinent negatives include no chest pain, fever, sore throat or wheezing. Exacerbated by: movement. Treatments tried: coricidin, mucinex. There is no history of asthma, bronchitis or pneumonia. lung cancer       Constitution: Negative for fever.   HENT:  Positive for congestion (chest congestion). Negative for sinus pain, sinus pressure and sore throat.    Neck: Negative for neck pain.   Cardiovascular:  " Negative for chest pain, palpitations and sob on exertion.   Respiratory:  Positive for chest tightness, cough and shortness of breath. Negative for sputum production and wheezing.    Gastrointestinal:  Negative for abdominal pain, vomiting and diarrhea.   Skin:  Negative for color change and wound.   Neurological:  Negative for numbness and tingling.      Objective:     Physical Exam   Constitutional: She is oriented to person, place, and time. She appears well-developed. She is cooperative.  Non-toxic appearance. She does not appear ill. She appears distressed (appears SOB).   HENT:   Head: Normocephalic and atraumatic.   Ears:   Right Ear: Hearing and external ear normal.   Left Ear: Hearing and external ear normal.   Nose: Nose normal. No mucosal edema, rhinorrhea or nasal deformity. No epistaxis. Right sinus exhibits no maxillary sinus tenderness and no frontal sinus tenderness. Left sinus exhibits no maxillary sinus tenderness and no frontal sinus tenderness.   Mouth/Throat: Uvula is midline, oropharynx is clear and moist and mucous membranes are normal. No trismus in the jaw. Normal dentition. No uvula swelling. No oropharyngeal exudate, posterior oropharyngeal edema or posterior oropharyngeal erythema.   Eyes: Conjunctivae and lids are normal. No scleral icterus.   Neck: Trachea normal and phonation normal. Neck supple. No edema present. No erythema present. No neck rigidity present.   Cardiovascular: Normal rate, regular rhythm, normal heart sounds and normal pulses.   Pulmonary/Chest: She is in respiratory distress. She has no decreased breath sounds. She has no rhonchi. She has rales.         Comments: Patient is visibly short of breath. Needs to pause to catch her breath when speaking to me.     Abdominal: Normal appearance.   Musculoskeletal: Normal range of motion.         General: No deformity. Normal range of motion.      Right lower leg: No edema.      Left lower leg: No edema.   Neurological: She  is alert and oriented to person, place, and time. She exhibits normal muscle tone. Coordination normal.   Skin: Skin is warm, dry, intact, not diaphoretic and not pale.   Psychiatric: Her speech is normal and behavior is normal. Judgment and thought content normal.   Nursing note and vitals reviewed.      Assessment:     1. Hypervolemia, unspecified hypervolemia type    2. Acute cough      XR CHEST PA AND LATERAL  Result Date: 7/20/2025  EXAMINATION: XR CHEST PA AND LATERAL CLINICAL HISTORY: Acute cough TECHNIQUE: PA and lateral views of the chest were performed. COMPARISON: Chest radiograph from 07/15/2025 FINDINGS: Cardiac silhouette is not enlarged.  Stable cardiac device.  Prominence of the pulmonary vasculature.  Coarse interstitial lung markings similar to prior exam.  Right pleural effusion with atelectasis/consolidation in the right lower lung zone similar to prior exam.  Stable postsurgical changes of the chest.  No evidence of acute fracture.     Stable findings when compared to prior exam as described above. Electronically signed by: Silver Snell MD Date:    07/20/2025 Time:    13:44    X-Ray Chest PA And Lateral  Result Date: 7/15/2025  EXAMINATION: XR CHEST PA AND LATERAL CLINICAL HISTORY: Cough, unspecified TECHNIQUE: PA and lateral views of the chest were performed. COMPARISON: 05/08/2025. FINDINGS: There is a leadless cardiac pacemaker present.  There is a stent extending from the left subclavian vein to the junction of the left brachiocephalic vein and superior vena cava.  There are surgical clips projecting over the upper thorax.  The heart is mildly enlarged.  There is atherosclerotic calcification present within the aortic arch.  There is pulmonary vascular congestion present with diffuse coarsening of the interstitial markings throughout both lungs most suggestive of mild to moderate pulmonary edema with interstitial pneumonia considered less likely in this patient with history of cough.   There is blunting of the costophrenic angles and small bilateral pleural effusions are suspected.  There is no evidence for pneumothorax.  Bony structures are grossly intact.     Leadless pacemaker. Surgical changes. Mild cardiomegaly. Interstitial coarsening, increased in prominence when compared to 05/08/2025.  Findings are most suggestive of mild to moderate pulmonary edema. Small bilateral pleural effusions are suspected. Electronically signed by: Derick Stahl MD Date:    07/15/2025 Time:    15:37      Plan:       Hypervolemia, unspecified hypervolemia type  -     Refer to Emergency Dept.    Acute cough  -     XR CHEST PA AND LATERAL; Future; Expected date: 07/20/2025          Medical Decision Making:   History:   Old Medical Records: I decided to obtain old medical records.  Old Records Summarized: records from clinic visits.  Independently Interpreted Test(s):   I have ordered and independently interpreted X-rays - see summary below.  Clinical Tests:   Radiological Study: Ordered and Reviewed  Urgent Care Management:  A. Problem List:   -Acute: Hypervolemia    -Chronic: ESRD dialysis patient, COPD, HLD, HTN, chronic combined systolic and diastolic heart failure, anemia of chronic disease, DMII  B. Differential diagnosis: fluid overload, pneumonia, bronchitis, neoplasm   C. Diagnostic Testing Ordered: CXR   D. Diagnostic Testing Considered: None  E. Independent Historians: None  F. Urgent Care Midlevel Independent Results Interpretation: CXR reviewed and interpreted by me- pleural effusion noted on the right, appears the same as most recent CXR from 7/15  G. Radiology: See radiology report above   H. Review of Previous Medical Records: Patient's BP measurements have been low on previous occasions and today's BP reading appears consistent with her baseline.   I. Home Medications Reviewed  J. Social Determinants of Health considered  K. Medical Decision Making and Disposition: Patient presents with complaints of  having chest congestion, SOB and a dry cough. On exam, she is afebrile but is mild respiratory distress. Cannot complete a sentence without the need to pause to catch her breath. She has rales in the lung bases bilaterally. Discussed with my attending providers in addition to Dr. Roldan with [facility] call who agreed that patient requires a higher level of care than the urgent care can provide. She was instructed to go to the ED for further management. Declined EMS, her  is here and will transport her straight to the ED here.

## 2025-07-24 ENCOUNTER — TELEPHONE (OUTPATIENT)
Dept: PODIATRY | Facility: CLINIC | Age: 75
End: 2025-07-24
Payer: MEDICARE

## 2025-07-24 NOTE — TELEPHONE ENCOUNTER
Copied from CRM #[id number]. Topic: General Inquiry - Patient Advice  >> Jul 24, 2025 10:55 AM Kim wrote:  .1MEDICALADVICE     Patient is calling for Medical Advice regarding:Pt called in regards to speaking with staff she in admitted in the hospital and has a question about her zoya for tomorrow please advise.    How long has patient had these symptoms:N/A    Pharmacy name and phone#:N/A    Patient wants a call back or thru [facility], provide patient's call back phone number:Callback     Comments:    Please advise patient replies from provider may take up to 48 hours.

## 2025-07-25 ENCOUNTER — OFFICE VISIT (OUTPATIENT)
Dept: TRANSPLANT | Facility: CLINIC | Age: 75
End: 2025-07-25
Payer: MEDICARE

## 2025-07-25 ENCOUNTER — TELEPHONE (OUTPATIENT)
Dept: PODIATRY | Facility: CLINIC | Age: 75
End: 2025-07-25
Payer: MEDICARE

## 2025-07-25 VITALS
HEART RATE: 69 BPM | SYSTOLIC BLOOD PRESSURE: 98 MMHG | DIASTOLIC BLOOD PRESSURE: 53 MMHG | BODY MASS INDEX: 21.19 KG/M2 | HEIGHT: 65 IN | WEIGHT: 127.19 LBS

## 2025-07-25 DIAGNOSIS — N18.6 ESRD (END STAGE RENAL DISEASE) ON DIALYSIS: ICD-10-CM

## 2025-07-25 DIAGNOSIS — I50.42 CHRONIC COMBINED SYSTOLIC AND DIASTOLIC HEART FAILURE: Primary | ICD-10-CM

## 2025-07-25 DIAGNOSIS — Z99.2 ESRD (END STAGE RENAL DISEASE) ON DIALYSIS: ICD-10-CM

## 2025-07-25 PROCEDURE — 99999 PR PBB SHADOW E&M-EST. PATIENT-LVL IV: CPT | Mod: PBBFAC,,, | Performed by: INTERNAL MEDICINE

## 2025-07-25 RX ORDER — MIDODRINE HYDROCHLORIDE 10 MG/1
10 TABLET ORAL
Qty: 90 TABLET | Refills: 11 | Status: SHIPPED | OUTPATIENT
Start: 2025-07-25 | End: 2026-07-25

## 2025-07-25 NOTE — TELEPHONE ENCOUNTER
Copied from CRM #[id number]. Topic: General Inquiry - Patient Advice  >> Jul 25, 2025  9:08 AM Sol wrote:  .[email or web address]     Patient is calling for Medical Advice regarding:pt is requesting a call as soon as possible     Patient wants a call back or thru [facility], provide patient's call back phone number:Raisa Green [phone]      Comments:    Please advise patient replies from provider may take up to 48 hours.

## 2025-07-25 NOTE — PROGRESS NOTES
Advanced Heart Failure and Transplantation Clinic Follow up.      Attending Physician: Deion Thibodeaux MD.  The patient's last visit with me was on 6/4/2025.         HPI.  .Lily Green is a 73 year old white woman with celiac disease, diabetes mellitus type 2 with polyneuropathy, hypertension, hyperlipidemia, history of peptic ulcer disease, depression, anxiety, history of hysterectomy in 1987, history of peritonitis due to diffuse colonic ischemia status post total colectomy and end ileostomy creation on 1/14/2023, evacuation of intraabdominal hematoma, takedown of left lateral ileostomy and recreation of right lateral end ileostomy on 1/16/2023, elective robotic ileostomy reversal and ileocolonic anastomosis on 8/7/2023, irritable bowel syndrome, end stage renal disease status post right internal jugular permanent dialysis catheter on 1/25/2023 removed on 1/28/2023 and replaced with left internal jugular permanent dialysis catheter (on hemodialysis Tuesday Thursday Saturday), right middle lobe carcinoid tumor status post robotic-assisted converted to right thoracotomy with therapeutic wedge resection and mediastinal lymph node dissection and repair of bleeding right inferior pulmonary vein on 1/2/2024, atrial fibrillation, Medtronic Micra AV leadless cardiac pacemaker placement on 1/16/2024. She lives in [place], [place]. She is . Her primary care physician is Dr. Pavel Calixto.               She was hospitalized at [facility] from 1/2/2024 to 2/6/2024 for right middle lobe carcinoid tumor, parapneumonic effusion, atrial fibrillation with rapid ventricular response, tachy-eulogio syndrome, gastrointestinal bleeding while on heparin. She was discharged to [facility] skilled nursing facility. She was prescribed cefpodoxime and metronidazole for 13 days, fluconazole for 7  days, metoprolol tartrate, and venlafaxine.               She presented to [facility] Emergency Department on 2/12/2024 from the skilled nursing facility with increased work of breathing, dyspnea, and hypoxia with oxygen saturation of 70% on 2 liters/minute of supplemental oxygen. It occurred acutely on the day of presentation. Emergency medical services put her on CPAP with some improvement. She was put on BiPAP in the emergency department. She had tachycardia and tachypnea. Her last dialysis session was 2 days prior on Saturday. She reported cough and nausea. Labs showed BNP 2754 pg/mL, troponin 0.312 ng/mL. Venous blood gas showed pH 7.438, pCO2 38.9. EKG showed sinus tachycardia. Chest X-ray showed pleural effusions and possible multifocal pneumonia. Nephrology was consulted for emergent dialysis with goal of removing 3 liters. She was admitted to Critical Care Medicine.     Echocardiogram revealed new low left ventricular ejection fraction. Troponins remained elevated. Cardiology was consulted. She developed atrial fibrillation with rapid ventricular response on 2/13/2024. She was started on amiodarone infusion. She underwent slow low-efficiency dialysis (SLED). CT showed fluid collections in the right paraesophageal space and azygoesophageal recess. Cardiothoracic Surgery was consulted and recommended Interventional Radiology consult for drainage, but there was no safe window for approach. Interventional Cardiology deferred angiography due to respiratory failure. Palliative Care was consulted. Dialysis removed further fluid. Metoprolol was increased. Thoracentesis was performed on 2/15/2024 removing 1.45 liters, which was sent for analysis. She developed re-expansion pulmonary edema and was started on Airvo high flow oxygen. Airvo was weaned down to 50 liters/minute and FiO2 40% on 2/16/2024. Pleural effusion was transudative. She was started on vasopressor support. She was weaned to  high flow nasal cannula at 12 liters/minute on 2/17/2024. She tolerated SLED without norepinephrine. She was weaned to 4 liters/minute on 2/18/2024. She was transferred to Hospital Medicine Team W on 2/20/2024. She was started on nifedipine. She worked with Physical and Occupational Therapy. Interventional Cardiology was reconsulted. Left heart catheterization showed luminal irregularities. Interventional Cardiology recommended starting aspirin 81 mg daily and atorvastatin 10 mg daily and consulting Heart Transplant Service (HTS) for management of nonischemic cardiomyopathy. She was already on atorvastatin 40 mg daily. Palliative Care signed off and planned to see her outpatient. She continued to require 4 liters/minute of supplemental oxygen. She was still short of breath. HTS was consulted and they said General Cardiology could be consulted. Cardiology recommended stopping nifedipine, starting valsartan, and resuming her metoprolol. Nasal cannula was weaned to 2 liters/minute on 2/25/2024 but she started coughing. Repeat chest X-ray on 2/26/2024 paradoxically showed slight increase in left pleural effusion, as well as persistent mild pulmonary vascular congestion. Interventional Radiology was consulted for thoracentesis. They removed 725 mL of pleural fluid on 2/27/2024. Fluid was sent for cultures. She continued to feel short of breath with tachypnea on 2/28/2024. Net fluid status since admission was negative 13.8 liters at this point. Hypoxia resolved but she was coughing more. Chest CTA showed a new left lower lobe pneumonia. She was put on a course of doxycycline. Pharmacy recommended adding cefpodoxime. She was discharged to the skilled nursing facility on 3/1/2024.      March 26, 2024: patient has dyspnea on exertion, dyspnea with minimal activities. She came in a wheelchair with her . She was on rehab and said she was able to walk 150 steps.  She prefers sleeping elevated. She complained of leg  "edema. She is on HD M-W-F.        May 13, 2024:  patient complains of dyspnea on exertion with any activities, leg edema, cough that worsens on recumbence, abdominal distention    August 12, 2024: feels well. No ER visits or hospitalizations since last appointment. Her nephrologist worked on her since last appointment. Leg edema and congestion are gone.      February 21, 2025:  patient comes for regular follow up. She had acute overt GI bleed some days ago. She had hematochezia, and a drop in HGB from 12 to 7.4. She went to ER but was discharged with GI follow up to have studies as an outpatient. Her HGB is starting to trend up since the episode. She still feels somewhat weak and with dyspnea on exertion.     June 4, 2025: patient feels well in general. She is having occasional low Bps 80s/50s but also reports SBP 90s, 100, 110 at home. She has been taking midodrine on HD days but lately she has been taking it on non HD days when she see "low numbers". She checks her BP 3-4 time a day, every day.  Since last appointment, 1 month after, she went to hospitalwith SOB and fluid build up. She had HD with fluid removal and felt better. Today she is 2-3 pounds above her DRY weight.    July 25, 2025:  patient comes after being discharged yesterday from hospital. She was 4 days with dyspnea and fluid overload. Her HF meds were stopped due to low Bps while on HD. She feels better today but her BP is not high enough to restart HF meds.        Review of Systems   Constitutional:  Negative for chills, diaphoresis and fever.   HENT:  Negative for nasal congestion, rhinorrhea and sore throat.    Eyes:  Negative for visual disturbance.   Cardiovascular:  Negative for chest pain.   Gastrointestinal:  Negative for abdominal pain, diarrhea, nausea and vomiting.   Genitourinary:  Negative for difficulty urinating, dysuria and hematuria.   Integumentary:  Negative for rash.   Neurological:  Negative for seizures, syncope and " light-headedness.   Psychiatric/Behavioral:  Negative for agitation and hallucinations.         Past Medical History:   Diagnosis Date    Anxiety     Celiac disease 2018    Celiac disease     Depression     Diabetes mellitus     Family history of breast cancer in mother      at age 68    Hyperlipidemia     Hypertension     Hypertensive retinopathy of both eyes 2024    Meniere disease     Meniere's disease, unspecified ear     Menopause     Moderate nonproliferative diabetic retinopathy of both eyes 2024    On supplemental oxygen by nasal cannula 2023    Hypoxic respiratory failure resolved.  No oxygen desaturation on walk.        Peptic ulcer     Peritonitis 2023    Pleural effusion on left 01/15/2024    C/w transudate in setting of acute decompensated heart failure due to a-fib and volume overload.  Now appears euvolemic in clinic today, resolved.       Reflux esophagitis     Urinary tract infection     Vaginal infection     Vaginal Pap smear 2014    Pap/Hpv Negative         Past Surgical History:   Procedure Laterality Date    APPENDECTOMY      BREAST SURGERY      Tags    CARPAL TUNNEL RELEASE Bilateral 2017    ,     CLOSURE, COLOSTOMY Left 2023    Procedure: CLOSURE, COLOSTOMY;  Surgeon: Manuel Javier MD;  Location: [facility] OR;  Service: General;  Laterality: Left;    COLONOSCOPY  2018    Normal  (Cornell Lizarragaald)     COLOSTOMY Right 2023    Procedure: CREATION, COLOSTOMY;  Surgeon: Manuel Javier MD;  Location: [facility] OR;  Service: General;  Laterality: Right;    CORONARY ANGIOGRAPHY N/A 2024    Procedure: ANGIOGRAM, CORONARY ARTERY;  Surgeon: oTdd Carlisle MD;  Location: [facility] CATH LAB;  Service: Cardiology;  Laterality: N/A;    DILATION AND CURETTAGE OF UTERUS  1986    DUPUYTREN CONTRACTURE RELEASE Bilateral 2017    ESOPHAGOGASTRODUODENOSCOPY N/A 2024    Procedure: EGD (ESOPHAGOGASTRODUODENOSCOPY);  Surgeon: Yamilet Walters,  MD;  Location: [facility] ENDO (2ND FLR);  Service: Gastroenterology;  Laterality: N/A;    ESOPHAGOGASTRODUODENOSCOPY N/A 01/26/2024    Procedure: EGD (ESOPHAGOGASTRODUODENOSCOPY);  Surgeon: Yamilet Walters MD;  Location: [facility] ENDO (2ND FLR);  Service: Gastroenterology;  Laterality: N/A;    HYSTERECTOMY  1987    BEAU w/ appy, no BSO     IMPLANTATION OF LEADLESS PACEMAKER N/A 01/16/2024    Procedure: INSERTION, CARDIAC PACEMAKER, LEADLESS;  Surgeon: LENIN Frances MD;  Location: [facility] EP LAB;  Service: Cardiology;  Laterality: N/A;  SB, LEADLESS PPM (MICRA), MDT, ANES, EH, CVICU [id number]    INJECTION OF NEUROLYTIC AGENT AROUND LUMBAR SYMPATHETIC NERVE N/A 01/06/2022    Procedure: BLOCK, LUMBAR SYMPATHETIC;  Surgeon: Souleymane Rizo Jr., MD;  Location: [facility] PAIN MGT;  Service: Pain Management;  Laterality: N/A;  no pacemaker   pt is diabetic     INJECTION OF NEUROLYTIC AGENT AROUND LUMBAR SYMPATHETIC NERVE N/A 08/25/2022    Procedure: BLOCK, LUMBAR SYMPATHETIC;  Surgeon: Souleymane Rizo Jr., MD;  Location: [facility] PAIN MGT;  Service: Pain Management;  Laterality: N/A;  diabetic     INSERTION OF TUNNELED CENTRAL VENOUS HEMODIALYSIS CATHETER Right 01/25/2023    Procedure: INSERTION, CATHETER, HEMODIALYSIS, DUAL LUMEN;  Surgeon: Manuel Javier MD;  Location: [facility] OR;  Service: General;  Laterality: Right;    INSERTION, CATHETER, TUNNELED Left 01/28/2023    Procedure: Insertion,catheter,tunneled;  Surgeon: Watson Fontenot MD;  Location: [facility] OR;  Service: General;  Laterality: Left;    LAPAROTOMY, EXPLORATORY N/A 01/14/2023    Procedure: LAPAROTOMY, EXPLORATORY;  Surgeon: Manuel Javier MD;  Location: [facility] OR;  Service: General;  Laterality: N/A;    LAPAROTOMY, EXPLORATORY N/A 01/16/2023    Procedure: LAPAROTOMY, EXPLORATORY;  Surgeon: Manuel Javier MD;  Location: [facility] OR;  Service: General;  Laterality: N/A;    LEFT HEART CATHETERIZATION Left 02/21/2024    Procedure: Left heart cath;  Surgeon: Todd Carlisle  MD MARCO;  Location: [facility] CATH LAB;  Service: Cardiology;  Laterality: Left;    LYMPHADENECTOMY Right 01/02/2024    Procedure: LYMPHADENECTOMY;  Surgeon: Tan Thomson MD;  Location: [facility] OR 2ND FLR;  Service: Cardiothoracic;  Laterality: Right;    PACEMAKER, TEMPORARY, TRANSVENOUS, PEDIATRIC Right 01/12/2024    Procedure: Pacemaker, Temporary, Transvenous;  Surgeon: Todd Carlisle MD;  Location: [facility] CATH LAB;  Service: Cardiology;  Laterality: Right;    REMOVAL OF CATHETER Right 01/28/2023    Procedure: REMOVAL, CATHETER;  Surgeon: Watson Fontenot MD;  Location: [facility] OR;  Service: General;  Laterality: Right;    REMOVAL, TUNNELED CATH Right 01/25/2023    Procedure: REMOVAL, TUNNELED CATH;  Surgeon: Manuel Javier MD;  Location: [facility] OR;  Service: General;  Laterality: Right;    ROBOTIC BRONCHOSCOPY N/A 10/20/2023    Procedure: ROBOTIC BRONCHOSCOPY;  Surgeon: Mayda Monzon MD;  [facility];  Service: Pulmonary;  Laterality: N/A;    SHOULDER SURGERY  2009 & 2010    right rotator cuff    THORACOTOMY Right 01/02/2024    Procedure: THORACOTOMY;  Surgeon: Tan Thomson MD;  [facility];  Service: Cardiothoracic;  Laterality: Right;    THORACOTOMY, WITH INITIAL THERAPEUTIC WEDGE RESECTION OF LUNG Right 01/02/2024    Procedure: THORACOTOMY, WITH INITIAL THERAPEUTIC WEDGE RESECTION OF LUNG;  Surgeon: Tan Thomson MD;  [facility];  Service: Cardiothoracic;  Laterality: Right;    TONSILLECTOMY      UPPER GASTROINTESTINAL ENDOSCOPY  04/2018        Family History   Problem Relation Name Age of Onset    Vision loss Father Shin Cervantes Jr.     Arthritis Father Shin Cervantes Jr.     Breast cancer Mother Megan Bordenman     Cancer Mother Megan Cervantes     Vision loss Mother Megan Bordenman     Hyperlipidemia Sister Sweetie Caillet     Breast cancer Paternal Aunt Vado Redditt     Cancer Paternal Aunt Vado Gifty     Diabetes Paternal Grandfather Shin Cervantes,  "Sr.     Vision loss Sister Noreen Saha     Kidney disease Neg Hx          Review of patient's allergies indicates:   Allergen Reactions    Crestor [rosuvastatin] Swelling    Gluten protein         Current Outpatient Medications   Medication Instructions    amiodarone (PACERONE) 200 mg, Oral, Daily    aspirin (ECOTRIN) 81 MG EC tablet 1 tablet, Every morning    atorvastatin (LIPITOR) 40 mg, Oral, Nightly    B-complex with vitamin C (Z-BEC OR EQUIV) tablet 1 tablet, Every morning    beta carotene 13643 UNIT capsule 1 capsule, Every morning    blood-glucose meter kit Use to test twice a day    calcium carbonate (TUMS) 1,000 mg, Oral, 3 times daily PRN    calcium carbonate-vitamin D3 500 mg-10 mcg (400 unit) Tab 1 tablet, Daily    insulin aspart U-100 (NOVOLOG) 100 unit/mL (3 mL) InPn pen Inject before meals with scale:  201-250=+2, 251-300=+3; 301-350=+4, over 350=+5 units. Max daily dose 10 units    lancets (TRUEPLUS LANCETS) 30 gauge Misc USE ONE LANCET FOR TESTING FOUR TIMES DAILY    levothyroxine (SYNTHROID) 112 mcg, Oral, Before breakfast    loperamide (IMODIUM) 2 mg, Once as needed    midodrine (PROAMATINE) 10 mg, Oral, Every 8 hours    midodrine (PROAMATINE) 10 mg, Oral, Daily PRN    pantoprazole (PROTONIX) 40 mg, Oral, Daily    pen needle, diabetic (BD ULTRA-FINE MARIS PEN NEEDLE) 32 gauge x 5/32" Ndle 1 Device, Misc.(Non-Drug; Combo Route), 2 times daily    pregabalin (LYRICA) 75 mg, Oral, Every other day    MELLISSA-BENJY RX 1- mg-mg-mcg Tab 1 tablet, Oral, Daily    sevelamer carbonate (RENVELA) 800 mg, Oral, 3 times daily with meals, .    TRADJENTA 5 mg, Oral    TRUE METRIX GLUCOSE TEST STRIP Strp USE TO TEST TWICE DAILY    venlafaxine (EFFEXOR-XR) 37.5 mg, Oral, Daily        Vitals:    07/25/25 1006   BP: (!) 98/53   Pulse: 69        Wt Readings from Last 3 Encounters:   07/25/25 57.7 kg (127 lb 3.3 oz)   07/24/25 57.7 kg (127 lb 4.8 oz)   07/20/25 56.7 kg (125 lb)     Temp Readings from Last 3 " "Encounters:   07/24/25 97.8 °F (36.6 °C) (Oral)   07/20/25 98.5 °F (36.9 °C) (Oral)   05/08/25 97.5 °F (36.4 °C) (Oral)     BP Readings from Last 3 Encounters:   07/25/25 (!) 98/53   07/24/25 (!) 110/55   07/20/25 (!) 99/51     Pulse Readings from Last 3 Encounters:   07/25/25 69   07/24/25 69   07/20/25 75        Body mass index is 21.17 kg/m². Estimated body surface area is 1.63 meters squared as calculated from the following:    Height as of this encounter: 5' 5" (1.651 m).    Weight as of this encounter: 57.7 kg (127 lb 3.3 oz).     Physical Exam  Constitutional:       Appearance: She is well-developed.   HENT:      Head: Normocephalic and atraumatic.      Right Ear: External ear normal.      Left Ear: External ear normal.   Eyes:      Conjunctiva/sclera: Conjunctivae normal.      Pupils: Pupils are equal, round, and reactive to light.   Neck:      Vascular: No hepatojugular reflux or JVD.   Cardiovascular:      Rate and Rhythm: Normal rate and regular rhythm.      Pulses: Intact distal pulses.      Heart sounds: S1 normal and S2 normal. No murmur heard.     No friction rub. No gallop.   Pulmonary:      Effort: Pulmonary effort is normal.      Breath sounds: Normal breath sounds.   Abdominal:      General: Bowel sounds are normal. There is no distension.      Palpations: Abdomen is soft.      Tenderness: There is no abdominal tenderness. There is no guarding or rebound.   Musculoskeletal:      Cervical back: Normal range of motion and neck supple.      Right lower leg: No edema.      Left lower leg: No edema.   Neurological:      Mental Status: She is alert and oriented to person, place, and time.          Lab Results   Component Value Date    BNP 3,234 (H) 07/20/2025     07/24/2025     (L) 03/01/2025    K 5.9 (H) 07/24/2025    K 4.5 03/01/2025    MG 3.0 (H) 07/24/2025    CL 96 07/24/2025     03/01/2025    CO2 25 07/24/2025    CO2 21 (L) 03/01/2025    BUN 49 (H) 07/24/2025    CREATININE 6.2 (H) " 07/24/2025     (H) 07/24/2025     (H) 03/01/2025    HGBA1C 5.6 05/23/2025    HGBA1C 5.3 01/28/2025    HGBA1C 6.8 10/23/2019    AST 67 (H) 07/24/2025    AST 35 03/01/2025    ALT 82 (H) 07/24/2025    ALT 27 03/01/2025    ALBUMIN 3.8 07/24/2025    ALBUMIN 3.1 (L) 03/01/2025    PROT 10.6 (H) 07/24/2025    PROT 7.5 03/01/2025    BILITOT 0.5 07/24/2025    BILITOT 0.3 03/01/2025    WBC 14.62 (H) 07/24/2025    HGB 13.1 07/24/2025    HGB 9.1 (L) 03/01/2025    HCT 42.1 07/24/2025    HCT 30.7 (L) 03/01/2025    HCT 40 03/26/2024     07/24/2025     03/01/2025    INR 1.1 01/28/2025     (H) 02/15/2024    TSH 19.949 (H) 03/26/2024    CHOL 127 05/23/2025    CHOL 125 02/16/2024    HDL 46 05/23/2025    HDL 53 10/23/2019    LDLCALC 58.0 (L) 05/23/2025    TRIG 115 05/23/2025    TRIG 128 02/16/2024    TRIG 102 10/23/2019    FREET4 0.79 03/26/2024       Results for orders placed during the hospital encounter of 02/21/25    Echo    Interpretation Summary    Left Ventricle: The left ventricle is normal in size. Normal wall thickness. Normal wall motion. There is normal systolic function with a visually estimated ejection fraction of 55 - 60%. Diastolic function cannot be reliably determined in the presence of mitral valve disease.    Right Ventricle: Normal right ventricular cavity size. Wall thickness is normal. Systolic function is normal.    The left atrium is severely dilated.    Aortic Valve: The aortic valve is a trileaflet valve. There is moderate aortic valve sclerosis. Mildly restricted motion. There is mild aortic regurgitation.    Mitral Valve: There is moderate regurgitation with a centrally directed jet.    Tricuspid Valve: There is mild regurgitation.    Aorta: Aortic root is normal in size measuring 3.11 cm. Ascending aorta is at least upper normal in size measuring 3.7 cm, but the ascending aorta is not ideally visualized.    Pulmonary Artery: The estimated pulmonary artery systolic  pressure is 28 mmHg.    IVC/SVC: Normal venous pressure at 3 mmHg.        Results for orders placed during the hospital encounter of 02/12/24    Cardiac catheterization    Conclusion    Non-obstructive CAD    Non-ischemic cardiomyopathy    The estimated blood loss was <50 mL.    The procedure log was documented by Documenter: Tori Corral and verified by Todd Carlisle MD.    Date: 2/21/2024  Time: 3:22 PM         Assessment and Plan:  There are no diagnoses linked to this encounter.      Chronic systolic HF, NYHA class II, stage C. LVEF 55-60%, improved from 20-25%.    Etiology: NICM, suspected tachycardia induced. Has atrial fibrillation and previous HFrEF that had recovered normal function in 2023. Back down to 25% in the setting of a. Fib with RVR last February 2024.  Since then  her EF improved again to 55-60%.   Devices: leadless pacemaker.  Medications: none  Hemodynamic status: warm, normotensive, euvolemic.  Clinical course:  low EF since 2023. Recovered EF after treatment of a. Fib. She also has been effectively decongested by her HD team.  Plan:  -no change on medications. Unable to restart HF meds as BP is not high enough.  -f/u in 6 months with labs and echo.  -fluid removal on HD days.        2. Atrial fibrillation and pacemaker. On amiodarone.  Follows with EP for treatment. Off anticoagulation.     3. H/o lung cancer (carcinoid) s/p resection in 2024.     4. ESRD on HD.

## 2025-08-11 ENCOUNTER — OFFICE VISIT (OUTPATIENT)
Dept: PRIMARY CARE CLINIC | Facility: CLINIC | Age: 75
End: 2025-08-11
Payer: MEDICARE

## 2025-08-11 VITALS
SYSTOLIC BLOOD PRESSURE: 100 MMHG | HEIGHT: 65 IN | HEART RATE: 63 BPM | BODY MASS INDEX: 19.43 KG/M2 | WEIGHT: 116.63 LBS | DIASTOLIC BLOOD PRESSURE: 50 MMHG | OXYGEN SATURATION: 96 %

## 2025-08-11 DIAGNOSIS — K21.9 GERD WITHOUT ESOPHAGITIS: ICD-10-CM

## 2025-08-11 DIAGNOSIS — F51.01 PRIMARY INSOMNIA: ICD-10-CM

## 2025-08-11 DIAGNOSIS — I48.11 LONGSTANDING PERSISTENT ATRIAL FIBRILLATION: ICD-10-CM

## 2025-08-11 DIAGNOSIS — D3A.090 CARCINOID TUMOR OF LUNG, UNSPECIFIED WHETHER MALIGNANT: ICD-10-CM

## 2025-08-11 DIAGNOSIS — E11.22 TYPE 2 DIABETES MELLITUS WITH CHRONIC KIDNEY DISEASE ON CHRONIC DIALYSIS, WITH LONG-TERM CURRENT USE OF INSULIN: Primary | ICD-10-CM

## 2025-08-11 DIAGNOSIS — Z99.2 ESRD (END STAGE RENAL DISEASE) ON DIALYSIS: ICD-10-CM

## 2025-08-11 DIAGNOSIS — R29.898 MUSCULAR DECONDITIONING: ICD-10-CM

## 2025-08-11 DIAGNOSIS — N18.6 ESRD (END STAGE RENAL DISEASE) ON DIALYSIS: ICD-10-CM

## 2025-08-11 DIAGNOSIS — K90.0 CELIAC DISEASE: ICD-10-CM

## 2025-08-11 DIAGNOSIS — E11.42 TYPE 2 DIABETES MELLITUS WITH DIABETIC POLYNEUROPATHY, WITHOUT LONG-TERM CURRENT USE OF INSULIN: ICD-10-CM

## 2025-08-11 DIAGNOSIS — N18.6 TYPE 2 DIABETES MELLITUS WITH CHRONIC KIDNEY DISEASE ON CHRONIC DIALYSIS, WITH LONG-TERM CURRENT USE OF INSULIN: Primary | ICD-10-CM

## 2025-08-11 DIAGNOSIS — I50.20 HFREF (HEART FAILURE WITH REDUCED EJECTION FRACTION): ICD-10-CM

## 2025-08-11 DIAGNOSIS — H81.03 MENIERE'S DISEASE OF BOTH EARS: ICD-10-CM

## 2025-08-11 DIAGNOSIS — Z79.4 TYPE 2 DIABETES MELLITUS WITH CHRONIC KIDNEY DISEASE ON CHRONIC DIALYSIS, WITH LONG-TERM CURRENT USE OF INSULIN: Primary | ICD-10-CM

## 2025-08-11 DIAGNOSIS — R26.81 GAIT INSTABILITY: ICD-10-CM

## 2025-08-11 DIAGNOSIS — Z99.2 TYPE 2 DIABETES MELLITUS WITH CHRONIC KIDNEY DISEASE ON CHRONIC DIALYSIS, WITH LONG-TERM CURRENT USE OF INSULIN: Primary | ICD-10-CM

## 2025-08-11 DIAGNOSIS — E78.2 MIXED HYPERLIPIDEMIA: ICD-10-CM

## 2025-08-11 PROCEDURE — 1111F DSCHRG MED/CURRENT MED MERGE: CPT | Mod: CPTII,S$GLB,, | Performed by: INTERNAL MEDICINE

## 2025-08-11 PROCEDURE — 3066F NEPHROPATHY DOC TX: CPT | Mod: CPTII,S$GLB,, | Performed by: INTERNAL MEDICINE

## 2025-08-11 PROCEDURE — 1159F MED LIST DOCD IN RCRD: CPT | Mod: CPTII,S$GLB,, | Performed by: INTERNAL MEDICINE

## 2025-08-11 PROCEDURE — 3044F HG A1C LEVEL LT 7.0%: CPT | Mod: CPTII,S$GLB,, | Performed by: INTERNAL MEDICINE

## 2025-08-11 PROCEDURE — 99999 PR PBB SHADOW E&M-EST. PATIENT-LVL V: CPT | Mod: PBBFAC,,, | Performed by: INTERNAL MEDICINE

## 2025-08-11 PROCEDURE — 1157F ADVNC CARE PLAN IN RCRD: CPT | Mod: CPTII,S$GLB,, | Performed by: INTERNAL MEDICINE

## 2025-08-11 PROCEDURE — 1126F AMNT PAIN NOTED NONE PRSNT: CPT | Mod: CPTII,S$GLB,, | Performed by: INTERNAL MEDICINE

## 2025-08-11 PROCEDURE — 3074F SYST BP LT 130 MM HG: CPT | Mod: CPTII,S$GLB,, | Performed by: INTERNAL MEDICINE

## 2025-08-11 PROCEDURE — 4010F ACE/ARB THERAPY RXD/TAKEN: CPT | Mod: CPTII,S$GLB,, | Performed by: INTERNAL MEDICINE

## 2025-08-11 PROCEDURE — 3078F DIAST BP <80 MM HG: CPT | Mod: CPTII,S$GLB,, | Performed by: INTERNAL MEDICINE

## 2025-08-11 PROCEDURE — 99214 OFFICE O/P EST MOD 30 MIN: CPT | Mod: S$GLB,,, | Performed by: INTERNAL MEDICINE

## 2025-08-11 RX ORDER — ONDANSETRON 4 MG/1
4 TABLET, ORALLY DISINTEGRATING ORAL EVERY 12 HOURS PRN
Qty: 30 TABLET | Refills: 3 | Status: SHIPPED | OUTPATIENT
Start: 2025-08-11

## 2025-08-11 RX ORDER — FAMOTIDINE 40 MG/1
40 TABLET, FILM COATED ORAL DAILY
Qty: 90 TABLET | Refills: 3 | Status: SHIPPED | OUTPATIENT
Start: 2025-08-11 | End: 2026-08-11

## 2025-08-12 ENCOUNTER — PATIENT MESSAGE (OUTPATIENT)
Dept: NEPHROLOGY | Facility: HOSPITAL | Age: 75
End: 2025-08-12
Payer: MEDICARE

## 2025-08-12 ENCOUNTER — TELEPHONE (OUTPATIENT)
Dept: PRIMARY CARE CLINIC | Facility: CLINIC | Age: 75
End: 2025-08-12
Payer: MEDICARE

## 2025-08-12 DIAGNOSIS — R06.02 SHORTNESS OF BREATH: Primary | ICD-10-CM

## 2025-08-12 DIAGNOSIS — I50.32 CHRONIC DIASTOLIC HEART FAILURE: Primary | ICD-10-CM

## 2025-08-12 DIAGNOSIS — I50.32 CHRONIC DIASTOLIC HEART FAILURE: ICD-10-CM

## 2025-08-20 ENCOUNTER — HOSPITAL ENCOUNTER (OUTPATIENT)
Dept: PULMONOLOGY | Facility: HOSPITAL | Age: 75
Discharge: HOME | End: 2025-08-20
Attending: INTERNAL MEDICINE
Payer: MEDICARE

## 2025-08-20 VITALS — HEIGHT: 65 IN | WEIGHT: 116 LBS | BODY MASS INDEX: 19.33 KG/M2

## 2025-08-20 DIAGNOSIS — R06.02 SHORTNESS OF BREATH: ICD-10-CM

## 2025-08-20 PROCEDURE — 94618 PULMONARY STRESS TESTING: CPT

## 2025-08-28 ENCOUNTER — TELEPHONE (OUTPATIENT)
Dept: PODIATRY | Facility: CLINIC | Age: 75
End: 2025-08-28
Payer: MEDICARE

## 2025-09-04 RX ORDER — PANTOPRAZOLE SODIUM 40 MG/1
40 TABLET, DELAYED RELEASE ORAL DAILY
Qty: 30 TABLET | Refills: 2 | Status: SHIPPED | OUTPATIENT
Start: 2025-09-04 | End: 2025-12-03

## (undated) DEVICE — DRESSING TRANS 2X2 TEGADERM

## (undated) DEVICE — NDL VIZISHOT 2 FLEX 22G

## (undated) DEVICE — ELECTRODE REM PLYHSV RETURN 9

## (undated) DEVICE — COVER PROBE 6X48

## (undated) DEVICE — KIT ANTIFOG W/SPONG & FLUID

## (undated) DEVICE — CONTAINER SPECIMEN STRL 4OZ

## (undated) DEVICE — MANIFOLD 4 PORT

## (undated) DEVICE — PACK PACER PERMANENT OMC

## (undated) DEVICE — DILATOR VESSEL 16FR 20CM

## (undated) DEVICE — DRESSING ANTIMICROBIAL 1 INCH

## (undated) DEVICE — SUT MCRYL PLUS 4-0 PS2 27IN

## (undated) DEVICE — APPLIER LIGACLIP MED 11IN

## (undated) DEVICE — DRESSING AQUACEL AG ADV 3.5X12

## (undated) DEVICE — GUIDEWIRE EMERALD .035IN 260CM

## (undated) DEVICE — ADAPTER SWIVEL

## (undated) DEVICE — SUT 1 48IN PDS II VIO MONO

## (undated) DEVICE — SUT 3-0 VICRYL / SH (J416)

## (undated) DEVICE — SUT 2-0 VICRYL / CT-1

## (undated) DEVICE — SPIKE SHORT LG BORE 1-WAY 2IN

## (undated) DEVICE — NDL HYPO REG 25G X 1 1/2

## (undated) DEVICE — GLOVE SURGICAL LATEX SZ 7

## (undated) DEVICE — PENCIL GOLF STERILE

## (undated) DEVICE — ELECTRODE BLADE INSULATED 1 IN

## (undated) DEVICE — DRESSING COVER AQUACEL AG SURG

## (undated) DEVICE — GUIDE WIRE AMPLATZ .035X1 MDTH

## (undated) DEVICE — SPONGE DERMA 8PLY 2X2

## (undated) DEVICE — TRAY CATH LAB OMC

## (undated) DEVICE — COVER OVERHEAD SURG LT BLUE

## (undated) DEVICE — DRAPE THYROID SOFT STERILE

## (undated) DEVICE — OMNIPAQUE 300MG 50ML

## (undated) DEVICE — SYR SLIP TIP 20CC

## (undated) DEVICE — POUCH SQUIBB ACTIVE LIF

## (undated) DEVICE — HEMOSTAT SURGICEL 4X8IN

## (undated) DEVICE — CATH BRONCHOSCOPE F/BF

## (undated) DEVICE — PACK ECLIPSE SET-UP W/O DRAPE

## (undated) DEVICE — GUIDEWIRE MARKED 3MM J .038X70

## (undated) DEVICE — KIT CUSTOM MANIFOLD

## (undated) DEVICE — CATH TEMP PACER 5.0FR

## (undated) DEVICE — OBTURATOR BLADELESS 8MM XI CLR

## (undated) DEVICE — DILATOR VESSEL COONS 18FR 20CM

## (undated) DEVICE — SYR IRRIGATION BULB STER 60ML

## (undated) DEVICE — DRAPE SCOPE PILLOW WARMER

## (undated) DEVICE — APPLICATOR CHLORAPREP ORN 26ML

## (undated) DEVICE — STAPLER SKIN ROTATING HEAD

## (undated) DEVICE — DRAPE INCISE IOBAN 2 23X17IN

## (undated) DEVICE — NDL VIZISHOT 2 FLEX 19G

## (undated) DEVICE — DRAPE FLUID WARMER ORS 44X44IN

## (undated) DEVICE — SHEATH HEMOSTASIS 8.5FR

## (undated) DEVICE — TRANSDUCER ADULT DISP

## (undated) DEVICE — DRAPE ARM DAVINCI XI

## (undated) DEVICE — DEVICE MYNX GRIP 6/7F

## (undated) DEVICE — DRAPE C-ARM/MOBILE XRAY 44X80

## (undated) DEVICE — TUBING NEPTUNE 2 SMOKE 10IN

## (undated) DEVICE — COVER PROBE US 5.5X58L NON LTX

## (undated) DEVICE — SYS LABEL CORRECT MED

## (undated) DEVICE — SYR 30CC LUER LOCK

## (undated) DEVICE — GOWN POLY REINF BRTH SLV LG

## (undated) DEVICE — KIT GLIDESHEATH SLEND 6FR 10CM

## (undated) DEVICE — BLADE SURG CARBON STEEL SZ11

## (undated) DEVICE — SYR ONLY LUER LOCK 20CC

## (undated) DEVICE — KIT PERCUTANEOUS SHEATH

## (undated) DEVICE — SUT CTD VICRYL 3-0 CR/SH

## (undated) DEVICE — DRESSING XEROFORM FOIL PK 1X8

## (undated) DEVICE — GUIDEWIRE EMERALD 150CM PTFE

## (undated) DEVICE — SUT SILK 0 STRANDS 30IN BLK

## (undated) DEVICE — BOWL STERILE LARGE 32OZ

## (undated) DEVICE — DRAPE LAP T SHT W/ INSTR PAD

## (undated) DEVICE — BAG ION VISION PROBE

## (undated) DEVICE — SYR B-D DISP CONTROL 10CC100/C

## (undated) DEVICE — DRAPE ANGIO BRACH 38X44IN

## (undated) DEVICE — CANNULA SEAL 12MM

## (undated) DEVICE — SUT SILK 2-0 SH 18IN BLACK

## (undated) DEVICE — PORT AIRSEAL 12/120MM LPI

## (undated) DEVICE — GAUZE SPONGE 4X4 12PLY

## (undated) DEVICE — SUPPORT ULNA NERVE PROTECTOR

## (undated) DEVICE — Device

## (undated) DEVICE — CYTOSPIN COLLECTION FLUID BLT

## (undated) DEVICE — FORCEP JAW RADIAL PULM 2X100CM

## (undated) DEVICE — GLOVE SURGICAL LATEX SZ 8

## (undated) DEVICE — GLOVE BIOGEL ORTHOPEDIC 7.5

## (undated) DEVICE — SET TRI-LUMEN FILTERED TUBE

## (undated) DEVICE — DRESSING TRANS 4X4 TEGADERM

## (undated) DEVICE — GAUZE DRAIN N WVN 6PLY 4X4IN

## (undated) DEVICE — SUT PDS II 0 CT-1 VIL MONO

## (undated) DEVICE — GOWN POLY REINF BRTH SLV XL

## (undated) DEVICE — CLIP LIGATION MEDIUM CLIPS 1

## (undated) DEVICE — CLOSURE SKIN STERI STRIP 1/2X4

## (undated) DEVICE — DRESSING SURGICAL 1X3

## (undated) DEVICE — TAPE SILK 3IN

## (undated) DEVICE — DRESSING AQUACEL SACRAL 9 X 9

## (undated) DEVICE — ADHESIVE DERMABOND ADVANCED

## (undated) DEVICE — KIT MICROINTRO 4F .018X40X7CM

## (undated) DEVICE — CONTAINERS 32OZ

## (undated) DEVICE — GOWN SMART IMP BREATHABLE XXLG

## (undated) DEVICE — NDL ASPIRATING VIZISHOT 20-40M

## (undated) DEVICE — CATH JACKY RADIAL 5FR 100CM

## (undated) DEVICE — PACK SURGERY START

## (undated) DEVICE — CABLE PACER

## (undated) DEVICE — SUT SILK 3-0 SH DETACH 30IN

## (undated) DEVICE — SYR 10CC LUER LOCK

## (undated) DEVICE — RELOAD ENDO GIA TRISTAPLE 45MM

## (undated) DEVICE — DRESSING LEUKOPLAST FLEX 1X3IN

## (undated) DEVICE — BLADE ELECTRO EXTENDED.

## (undated) DEVICE — KIT PROBE COVER WITH GEL

## (undated) DEVICE — CATH DXTERITY JR40 100CM 6FR

## (undated) DEVICE — CATH DXTERITY JL40 100CM 6FR

## (undated) DEVICE — SEAL UNIVERSAL 5MM-8MM XI

## (undated) DEVICE — TRAY FOLEY 16FR INFECTION CONT

## (undated) DEVICE — DRAPE COLUMN DAVINCI XI

## (undated) DEVICE — DRAPE THREE-QTR REINF 53X77IN

## (undated) DEVICE — CUTTER PROXIMATE BLUE 75MM

## (undated) DEVICE — DEVICE PICC SECURE SORBA VIEW

## (undated) DEVICE — SUT 0 30IN ETHIBOND EXCEL G

## (undated) DEVICE — SHEATH INTRODUCER 6FR 11CM

## (undated) DEVICE — RELOAD ECHELON FLEX BLU 60MM

## (undated) DEVICE — CATH THORACIC 28FR ST

## (undated) DEVICE — DRAPE ABDOMINAL TIBURON 14X11

## (undated) DEVICE — GUIDEWIRE STD .035X180CM ANG

## (undated) DEVICE — TUBING SUC UNIV W/CONN 12FT

## (undated) DEVICE — SET DECANTER MEDICHOICE

## (undated) DEVICE — COVER LIGHT HANDLE

## (undated) DEVICE — DRESSING TEGADERM 2 3/8 X 2.75

## (undated) DEVICE — SUT VICRYL 3-0 27 SH

## (undated) DEVICE — PACK EP DRAPE OMC

## (undated) DEVICE — PAD DEFIB CADENCE ADULT R2

## (undated) DEVICE — DRESSING SORVAVIEW SHIELD

## (undated) DEVICE — STAPLER GIA HANDLE STD

## (undated) DEVICE — DRESSING TEGADERM 4.4X5IN

## (undated) DEVICE — NDL SPINAL 18GX3.5 SPINOCAN

## (undated) DEVICE — HEMOSTAT SURGICEL NUKNIT 3X4IN

## (undated) DEVICE — SPONGE LAP 18X18 PREWASHED

## (undated) DEVICE — STAPLER ECHELON FLEX GST 60MM

## (undated) DEVICE — ALCOHOL BLEND 95%

## (undated) DEVICE — POWDER ARISTA AH 3G

## (undated) DEVICE — SUT ETHILON 3-0 PS2 18 BLK

## (undated) DEVICE — TRAP MUCUS SPECIMEN 80CC

## (undated) DEVICE — SUT MONOCRYL UD 4-0 27 PS-1

## (undated) DEVICE — SUT 2/0 30IN SILK BLK BRAI

## (undated) DEVICE — LOOP VESSEL BLUE MAXI

## (undated) DEVICE — CONNECTOR SWIVEL

## (undated) DEVICE — SHEATH BRITE TIP 9F 35CM

## (undated) DEVICE — OMNIPAQUE 350 200ML

## (undated) DEVICE — DRESSING TRANS 4X4 3/4

## (undated) DEVICE — LUBRICANT SURGILUBE 2 OZ

## (undated) DEVICE — RELOAD PROXIMATE CUT BLUE 75MM

## (undated) DEVICE — APPLIER LIGACLIP SM 9.38IN

## (undated) DEVICE — SPONGE COTTON TRAY 4X4IN

## (undated) DEVICE — DILATOR COONS TAPER 14FR

## (undated) DEVICE — ADAPTER VISION PROBE & SUCTION

## (undated) DEVICE — CANNULA REDUCER 12-8MM

## (undated) DEVICE — SUT SILK 2-0 STRANDS 30IN

## (undated) DEVICE — SOL WATER STRL IRR 1000ML

## (undated) DEVICE — TRAY MINOR GEN SURG OMC

## (undated) DEVICE — ELECTRODE BLADE TEFLON 6

## (undated) DEVICE — SUT VICRYL PLUS 2-0 CT1 18

## (undated) DEVICE — SUT 0 VICRYL / CT-1

## (undated) DEVICE — SEALER LIGASURE IMPACT 18CM

## (undated) DEVICE — DRAIN CHEST DRY SUCTION